# Patient Record
Sex: MALE | Race: WHITE | NOT HISPANIC OR LATINO | Employment: OTHER | URBAN - METROPOLITAN AREA
[De-identification: names, ages, dates, MRNs, and addresses within clinical notes are randomized per-mention and may not be internally consistent; named-entity substitution may affect disease eponyms.]

---

## 2018-06-04 ENCOUNTER — TRANSFERRED RECORDS (OUTPATIENT)
Dept: HEALTH INFORMATION MANAGEMENT | Facility: CLINIC | Age: 68
End: 2018-06-04

## 2018-06-11 ENCOUNTER — TRANSFERRED RECORDS (OUTPATIENT)
Dept: HEALTH INFORMATION MANAGEMENT | Facility: CLINIC | Age: 68
End: 2018-06-11

## 2018-06-25 ENCOUNTER — MEDICAL CORRESPONDENCE (OUTPATIENT)
Dept: HEALTH INFORMATION MANAGEMENT | Facility: CLINIC | Age: 68
End: 2018-06-25

## 2018-06-27 NOTE — TELEPHONE ENCOUNTER
FUTURE VISIT INFORMATION      FUTURE VISIT INFORMATION:    Date: 7/3/18    Time: 1500    Location: [facility]  REFERRAL INFORMATION:    Referring provider:  DR RODRÍGUEZ    Referring providers clinic:  [facility]    Reason for visit/diagnosis  L HIP    RECORDS REQUESTED FROM:       Clinic name Comments Records Status Imaging Status                                         RECORDS STATUS      RECORDS RECEIVED FROM: [facility]   DATE RECEIVED: 6/27/18   NOTES STATUS DETAILS   OFFICE NOTE from referring provider Received 6/1/18   OFFICE NOTE from other specialist N/A    DISCHARGE SUMMARY from hospital N/A    DISCHARGE REPORT from the ER N/A    OPERATIVE REPORT N/A    MEDICATION LIST N/A    IMPLANT RECORD/STICKER N/A    LABS     CBC/DIFF N/A    CULTURES N/A    NM BONE SCAN RECEIVED 6/11/18   MRI RECEIVED 6/4/18*   CT SCAN N/A    XRAYS (IMAGES & REPORTS) N/A    TUMOR     PATHOLOGY  Slides & report N/A

## 2018-06-30 ASSESSMENT — ENCOUNTER SYMPTOMS
ABDOMINAL PAIN: 0
MUSCLE CRAMPS: 1
BACK PAIN: 1
EYE IRRITATION: 1
ARTHRALGIAS: 1
NAUSEA: 0
DYSURIA: 0
EYE WATERING: 1
NECK PAIN: 1
RECTAL PAIN: 0
EYE REDNESS: 0
EYE PAIN: 0
HEARTBURN: 0
VOMITING: 0
FLANK PAIN: 0
POOR WOUND HEALING: 1
JAUNDICE: 0
MYALGIAS: 1
STIFFNESS: 1
DIARRHEA: 1
BRUISES/BLEEDS EASILY: 0
SWOLLEN GLANDS: 0
HEMATURIA: 0
MUSCLE WEAKNESS: 1
BLOOD IN STOOL: 1
JOINT SWELLING: 1
SKIN CHANGES: 0
BLOATING: 0
CONSTIPATION: 1
DIFFICULTY URINATING: 0
BOWEL INCONTINENCE: 0
DOUBLE VISION: 0
NAIL CHANGES: 1

## 2018-07-03 ENCOUNTER — OFFICE VISIT (OUTPATIENT)
Dept: ORTHOPEDICS | Facility: CLINIC | Age: 68
End: 2018-07-03
Payer: COMMERCIAL

## 2018-07-03 ENCOUNTER — PRE VISIT (OUTPATIENT)
Dept: ORTHOPEDICS | Facility: CLINIC | Age: 68
End: 2018-07-03

## 2018-07-03 VITALS — WEIGHT: 315 LBS | HEIGHT: 70 IN | BODY MASS INDEX: 45.1 KG/M2

## 2018-07-03 DIAGNOSIS — M89.9 BONE LESION: Primary | ICD-10-CM

## 2018-07-03 PROBLEM — Z47.89 AFTERCARE FOLLOWING SURGERY OF THE MUSCULOSKELETAL SYSTEM: Status: ACTIVE | Noted: 2018-03-22

## 2018-07-03 PROBLEM — D62 ACUTE BLOOD LOSS ANEMIA: Status: ACTIVE | Noted: 2018-03-08

## 2018-07-03 PROBLEM — K21.9 GASTROESOPHAGEAL REFLUX DISEASE WITHOUT ESOPHAGITIS: Status: ACTIVE | Noted: 2018-03-07

## 2018-07-03 PROBLEM — E66.01 MORBID OBESITY (H): Status: ACTIVE | Noted: 2018-03-07

## 2018-07-03 RX ORDER — FLUTICASONE PROPIONATE 50 MCG
1 SPRAY, SUSPENSION (ML) NASAL DAILY
COMMUNITY
End: 2022-01-01

## 2018-07-03 RX ORDER — FUROSEMIDE 20 MG
20 TABLET ORAL DAILY
Status: ON HOLD | COMMUNITY
Start: 2018-05-25 | End: 2023-01-01

## 2018-07-03 RX ORDER — OXYCODONE HYDROCHLORIDE 5 MG/1
TABLET ORAL
COMMUNITY
Start: 2018-06-25 | End: 2022-01-01

## 2018-07-03 RX ORDER — GLIPIZIDE 10 MG/1
10 TABLET, FILM COATED, EXTENDED RELEASE ORAL DAILY
Status: ON HOLD | COMMUNITY
Start: 2018-05-25 | End: 2023-01-01

## 2018-07-03 RX ORDER — ATORVASTATIN CALCIUM 40 MG/1
TABLET, FILM COATED ORAL
COMMUNITY
Start: 2018-05-25 | End: 2018-07-03

## 2018-07-03 RX ORDER — PEN NEEDLE, DIABETIC 32GX 5/32"
NEEDLE, DISPOSABLE MISCELLANEOUS
Status: ON HOLD | COMMUNITY
Start: 2018-05-24 | End: 2023-01-01

## 2018-07-03 RX ORDER — IBUPROFEN 600 MG/1
TABLET, FILM COATED ORAL
COMMUNITY
Start: 2018-03-09 | End: 2018-07-03

## 2018-07-03 RX ORDER — METFORMIN HCL 500 MG
TABLET, EXTENDED RELEASE 24 HR ORAL
COMMUNITY
Start: 2018-05-25 | End: 2022-01-01

## 2018-07-03 RX ORDER — LIRAGLUTIDE 6 MG/ML
1.8 INJECTION SUBCUTANEOUS DAILY
Status: ON HOLD | COMMUNITY
Start: 2018-06-19 | End: 2023-01-01

## 2018-07-03 RX ORDER — PIOGLITAZONEHYDROCHLORIDE 45 MG/1
45 TABLET ORAL DAILY
Status: ON HOLD | COMMUNITY
Start: 2018-05-25 | End: 2023-01-01

## 2018-07-03 RX ORDER — DIPHENOXYLATE HYDROCHLORIDE AND ATROPINE SULFATE 2.5; .025 MG/1; MG/1
1 TABLET ORAL
COMMUNITY
Start: 2012-07-03 | End: 2022-01-01

## 2018-07-03 RX ORDER — OMEPRAZOLE 40 MG/1
CAPSULE, DELAYED RELEASE ORAL
COMMUNITY
Start: 2018-05-25 | End: 2022-01-01

## 2018-07-03 RX ORDER — ATORVASTATIN CALCIUM 40 MG/1
40 TABLET, FILM COATED ORAL DAILY
Status: ON HOLD | COMMUNITY
Start: 2018-02-27 | End: 2023-01-01

## 2018-07-03 RX ORDER — HYDROCODONE BITARTRATE AND ACETAMINOPHEN 10; 325 MG/1; MG/1
TABLET ORAL
COMMUNITY
Start: 2018-02-19 | End: 2022-01-01

## 2018-07-03 RX ORDER — AMOXICILLIN 250 MG
1 CAPSULE ORAL
COMMUNITY
Start: 2018-03-09 | End: 2022-01-01

## 2018-07-03 RX ORDER — TRIAMCINOLONE ACETONIDE 1 MG/G
CREAM TOPICAL
COMMUNITY
End: 2022-01-01

## 2018-07-03 NOTE — LETTER
7/3/2018     RE: Waldo Bustos  [address] [facility] [address]     Dear Colleague,    Thank you for referring your patient, Waldo Bustos, to the [facility]. Please see a copy of my visit note below.    [facility] Orthopedic Consultation    Waldo Bustos MRN# [id number]   Age: 67 year old YOB: 1950          Impression and Recommendation:   Impression:  Waldo Bustos is a 67 year old male nearly 4 months s/p L BELLA at an OSH with persistent left hip pain and an incidental finding of a left posterior ilium intra-osseous lesion.  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  It is possible that his persistent hip pain secondary to irritation of his iliopsoas.    Discussion had with the patient regarding this incidental finding and that is likely of benign origin.  Given the symptoms he is describing and dislocation in the groin is unlikely at this lesion as it has anything to do with his persistent left hip pain.  Furthermore after review of his MRI this appears to be something that can be managed conservatively and followed without intervention.  Your to get completed imaging of this lesion we will send him for a CT of the pelvis and Dr. Whitaker will contact the patient but the results and if any further intervention is needed.           Chief Complaint:   Left hip pain         History of Present Illness:   This patient is a 67 year old male with a past medical history of morbid obesity and diabetes mellitus who presents with persistent left hip pain following a left total hip arthroplasty at [place] in March 2018.  During his workup for this continued left hip pain the patient was found to have a lesion to the posterior left ilium near the sacroiliac joint for which he was referred to Dr. Whitaker for evaluation.  Prior to hip surgery the patient's pain was primarily in the left  "groin and it has improved since surgery but not to the degree the patient had hoped.  The pain now still persists in the left groin although it is improved overall from prior to surgery.  He does have a long-standing history of low back pain without specific laterality and without any radiation down either of his legs.    Patient does report that his surgeon is sending him to [facility] for a steroid injection into his left iliopsoas.     History obtained from patient interview and chart review.        Past Medical History:     Past Medical History:   Diagnosis Date     Arthritis 5 years ago     Chronic osteoarthritis Not sure     Diabetes (H) 10-12 years ago     RA (rheumatoid arthritis) (H) Not sure     Reduced vision 2-3 years ago    Cataract Surgery on both eyes             Past Surgical History:     Past Surgical History:   Procedure Laterality Date     C PELVIS/HIP JOINT SURGERY UNLISTED       C STOMACH SURGERY PROCEDURE UNLISTED  1955    2 Hernia surgeries as a child            Physical Exam:     Ht 1.778 m (5' 10\")  Wt 148.9 kg (328 lb 4.8 oz)  BMI 47.11 kg/m2  General: awake, alert, cooperative, no apparent distress  Respiratory: breathing non-labored, no wheezing  Cardiovascular: peripheral pulses 2+ and symmetric, capillary refill < 2sec, skin wwp  Skin: no rashes or lesions  Musculoskeletal:  gains a standing position with support of his cane and; antalgic gait  LLE: No gross deformity.  Able to actively flex at the hip with 5 out of 5 strength although incites groin pain upon resistance.  Hip flexion to 90 .  Internal rotation 0 .  External rotation in flexion 45  .  Motor function completely intact distally          Imaging:   Review of MRI of the left hip and lumbar spine demonstrate an intraosseous lesion to the posterior left ilium abutting the sacroiliac joint without invasion.  Grossly homogeneous.  Enhancing on T2 imaging consistent with fluid-filled cyst.      Cali Johnson MD " 07/03/2018  Orthopaedic Surgery Resident, PGY-4  Pager: [phone]      Answers for HPI/ROS submitted by the patient on 6/30/2018   General Symptoms: No  Skin Symptoms: Yes  HENT Symptoms: No  EYE SYMPTOMS: Yes  HEART SYMPTOMS: No  LUNG SYMPTOMS: No  INTESTINAL SYMPTOMS: Yes  URINARY SYMPTOMS: Yes  REPRODUCTIVE SYMPTOMS: No  SKELETAL SYMPTOMS: Yes  BLOOD SYMPTOMS: Yes  NERVOUS SYSTEM SYMPTOMS: No  MENTAL HEALTH SYMPTOMS: No  Changes in hair: No  Changes in moles/birth marks: No  Itching: No  Rashes: No  Changes in nails: Yes  Acne: No  Change in facial hair: No  Warts: No  Non-healing sores: Yes  Scarring: No  Flaking of skin: No  Color changes of hands/feet in cold : No  Sun sensitivity: No  Skin thickening: No  Eye pain: No  Vision loss: No  Dry eyes: Yes  Watery eyes: Yes  Eye bulging: No  Double vision: No  Flashing of lights: Yes  Spots: No  Floaters: No  Redness: No  Crossed eyes: No  Tunnel Vision: No  Yellowing of eyes: No  Eye irritation: Yes  Heart burn or indigestion: No  Nausea: No  Vomiting: No  Abdominal pain: No  Bloating: No  Constipation: Yes  Diarrhea: Yes  Blood in stool: Yes  Black stools: No  Rectal or Anal pain: No  Fecal incontinence: No  Yellowing of skin or eyes: No  Vomit with blood: No  Change in stools: No  Trouble holding urine or incontinence: Yes  Pain or burning: No  Trouble starting or stopping: No  Increased frequency of urination: Yes  Blood in urine: No  Decreased frequency of urination: No  Frequent nighttime urination: Yes  Flank pain: No  Difficulty emptying bladder: No  Back pain: Yes  Muscle aches: Yes  Neck pain: Yes  Swollen joints: Yes  Joint pain: Yes  Bone pain: Yes  Muscle cramps: Yes  Muscle weakness: Yes  Joint stiffness: Yes  Bone fracture: No  Anemia: No  Swollen glands: No  Easy bleeding or bruising: No  Edema or swelling: Yes  PHQ-2 Score: 1      Dear Rogelio,    Thank you for asking me to see  for evaluation of an incidental finding of a lesion in the  posterior aspect of the left pelvis.  As you know this lesion is active on bone scan is seen on MRI scan as a fluid-filled spot.  I recommend a CT scan and based on that finding we will likely recommend image guided biopsy.  My suspicion is that the lesion is most likely benign bone cyst.    I saw the patient today with Dr. Johnson.  I agree with his assessment and plan.  We will keep you informed on her findings.  Answers for HPI/ROS submitted by the patient on 6/30/2018   General Symptoms: No  Skin Symptoms: Yes  HENT Symptoms: No  EYE SYMPTOMS: Yes  HEART SYMPTOMS: No  LUNG SYMPTOMS: No  INTESTINAL SYMPTOMS: Yes  URINARY SYMPTOMS: Yes  REPRODUCTIVE SYMPTOMS: No  SKELETAL SYMPTOMS: Yes  BLOOD SYMPTOMS: Yes  NERVOUS SYSTEM SYMPTOMS: No  MENTAL HEALTH SYMPTOMS: No  Changes in hair: No  Changes in moles/birth marks: No  Itching: No  Rashes: No  Changes in nails: Yes  Acne: No  Change in facial hair: No  Warts: No  Non-healing sores: Yes  Scarring: No  Flaking of skin: No  Color changes of hands/feet in cold : No  Sun sensitivity: No  Skin thickening: No  Eye pain: No  Vision loss: No  Dry eyes: Yes  Watery eyes: Yes  Eye bulging: No  Double vision: No  Flashing of lights: Yes  Spots: No  Floaters: No  Redness: No  Crossed eyes: No  Tunnel Vision: No  Yellowing of eyes: No  Eye irritation: Yes  Heart burn or indigestion: No  Nausea: No  Vomiting: No  Abdominal pain: No  Bloating: No  Constipation: Yes  Diarrhea: Yes  Blood in stool: Yes  Black stools: No  Rectal or Anal pain: No  Fecal incontinence: No  Yellowing of skin or eyes: No  Vomit with blood: No  Change in stools: No  Trouble holding urine or incontinence: Yes  Pain or burning: No  Trouble starting or stopping: No  Increased frequency of urination: Yes  Blood in urine: No  Decreased frequency of urination: No  Frequent nighttime urination: Yes  Flank pain: No  Difficulty emptying bladder: No  Back pain: Yes  Muscle aches: Yes  Neck pain: Yes  Swollen  joints: Yes  Joint pain: Yes  Bone pain: Yes  Muscle cramps: Yes  Muscle weakness: Yes  Joint stiffness: Yes  Bone fracture: No  Anemia: No  Swollen glands: No  Easy bleeding or bruising: No  Edema or swelling: Yes  PHQ-2 Score: 1    Again, thank you for allowing me to participate in the care of your patient.      Sincerely,    Maikol Whitaker MD

## 2018-07-03 NOTE — PROGRESS NOTES
Dear Rogelio,    Thank you for asking me to see  for evaluation of an incidental finding of a lesion in the posterior aspect of the left pelvis.  As you know this lesion is active on bone scan is seen on MRI scan as a fluid-filled spot.  I recommend a CT scan and based on that finding we will likely recommend image guided biopsy.  My suspicion is that the lesion is most likely benign bone cyst.    I saw the patient today with Dr. Johnson.  I agree with his assessment and plan.  We will keep you informed on her findings.  Answers for HPI/ROS submitted by the patient on 6/30/2018   General Symptoms: No  Skin Symptoms: Yes  HENT Symptoms: No  EYE SYMPTOMS: Yes  HEART SYMPTOMS: No  LUNG SYMPTOMS: No  INTESTINAL SYMPTOMS: Yes  URINARY SYMPTOMS: Yes  REPRODUCTIVE SYMPTOMS: No  SKELETAL SYMPTOMS: Yes  BLOOD SYMPTOMS: Yes  NERVOUS SYSTEM SYMPTOMS: No  MENTAL HEALTH SYMPTOMS: No  Changes in hair: No  Changes in moles/birth marks: No  Itching: No  Rashes: No  Changes in nails: Yes  Acne: No  Change in facial hair: No  Warts: No  Non-healing sores: Yes  Scarring: No  Flaking of skin: No  Color changes of hands/feet in cold : No  Sun sensitivity: No  Skin thickening: No  Eye pain: No  Vision loss: No  Dry eyes: Yes  Watery eyes: Yes  Eye bulging: No  Double vision: No  Flashing of lights: Yes  Spots: No  Floaters: No  Redness: No  Crossed eyes: No  Tunnel Vision: No  Yellowing of eyes: No  Eye irritation: Yes  Heart burn or indigestion: No  Nausea: No  Vomiting: No  Abdominal pain: No  Bloating: No  Constipation: Yes  Diarrhea: Yes  Blood in stool: Yes  Black stools: No  Rectal or Anal pain: No  Fecal incontinence: No  Yellowing of skin or eyes: No  Vomit with blood: No  Change in stools: No  Trouble holding urine or incontinence: Yes  Pain or burning: No  Trouble starting or stopping: No  Increased frequency of urination: Yes  Blood in urine: No  Decreased frequency of urination: No  Frequent nighttime urination:  Yes  Flank pain: No  Difficulty emptying bladder: No  Back pain: Yes  Muscle aches: Yes  Neck pain: Yes  Swollen joints: Yes  Joint pain: Yes  Bone pain: Yes  Muscle cramps: Yes  Muscle weakness: Yes  Joint stiffness: Yes  Bone fracture: No  Anemia: No  Swollen glands: No  Easy bleeding or bruising: No  Edema or swelling: Yes  PHQ-2 Score: 1

## 2018-07-03 NOTE — PROGRESS NOTES
[facility] Orthopedic Consultation    Waldo Bustos MRN# [id number]   Age: 67 year old YOB: 1950          Impression and Recommendation:   Impression:  Waldo Bustos is a 67 year old male nearly 4 months s/p L BELLA at an OSH with persistent left hip pain and an incidental finding of a left posterior ilium intra-osseous lesion.  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  It is possible that his persistent hip pain secondary to irritation of his iliopsoas.    Discussion had with the patient regarding this incidental finding and that is likely of benign origin.  Given the symptoms he is describing and dislocation in the groin is unlikely at this lesion as it has anything to do with his persistent left hip pain.  Furthermore after review of his MRI this appears to be something that can be managed conservatively and followed without intervention.  Your to get completed imaging of this lesion we will send him for a CT of the pelvis and Dr. Whitaker will contact the patient but the results and if any further intervention is needed.           Chief Complaint:   Left hip pain         History of Present Illness:   This patient is a 67 year old male with a past medical history of morbid obesity and diabetes mellitus who presents with persistent left hip pain following a left total hip arthroplasty at [place] in March 2018.  During his workup for this continued left hip pain the patient was found to have a lesion to the posterior left ilium near the sacroiliac joint for which he was referred to Dr. Whitaker for evaluation.  Prior to hip surgery the patient's pain was primarily in the left groin and it has improved since surgery but not to the degree the patient had hoped.  The pain now still persists in the left groin although it is improved overall from prior to surgery.  He does have a long-standing history of low back pain without specific laterality and without  "any radiation down either of his legs.    Patient does report that his surgeon is sending him to [facility] for a steroid injection into his left iliopsoas.     History obtained from patient interview and chart review.        Past Medical History:     Past Medical History:   Diagnosis Date     Arthritis 5 years ago     Chronic osteoarthritis Not sure     Diabetes (H) 10-12 years ago     RA (rheumatoid arthritis) (H) Not sure     Reduced vision 2-3 years ago    Cataract Surgery on both eyes             Past Surgical History:     Past Surgical History:   Procedure Laterality Date     C PELVIS/HIP JOINT SURGERY UNLISTED       C STOMACH SURGERY PROCEDURE UNLISTED  1955    2 Hernia surgeries as a child            Physical Exam:     Ht 1.778 m (5' 10\")  Wt 148.9 kg (328 lb 4.8 oz)  BMI 47.11 kg/m2  General: awake, alert, cooperative, no apparent distress  Respiratory: breathing non-labored, no wheezing  Cardiovascular: peripheral pulses 2+ and symmetric, capillary refill < 2sec, skin wwp  Skin: no rashes or lesions  Musculoskeletal:  gains a standing position with support of his cane and; antalgic gait  LLE: No gross deformity.  Able to actively flex at the hip with 5 out of 5 strength although incites groin pain upon resistance.  Hip flexion to 90 .  Internal rotation 0 .  External rotation in flexion 45 .  Motor function completely intact distally          Imaging:   Review of MRI of the left hip and lumbar spine demonstrate an intraosseous lesion to the posterior left ilium abutting the sacroiliac joint without invasion.  Grossly homogeneous.  Enhancing on T2 imaging consistent with fluid-filled cyst.      Cali Johnson MD 07/03/2018  Orthopaedic Surgery Resident, PGY-4  Pager: [phone]      Answers for HPI/ROS submitted by the patient on 6/30/2018   General Symptoms: No  Skin Symptoms: Yes  HENT Symptoms: No  EYE SYMPTOMS: Yes  HEART SYMPTOMS: No  LUNG SYMPTOMS: No  INTESTINAL SYMPTOMS: Yes  URINARY SYMPTOMS: " Yes  REPRODUCTIVE SYMPTOMS: No  SKELETAL SYMPTOMS: Yes  BLOOD SYMPTOMS: Yes  NERVOUS SYSTEM SYMPTOMS: No  MENTAL HEALTH SYMPTOMS: No  Changes in hair: No  Changes in moles/birth marks: No  Itching: No  Rashes: No  Changes in nails: Yes  Acne: No  Change in facial hair: No  Warts: No  Non-healing sores: Yes  Scarring: No  Flaking of skin: No  Color changes of hands/feet in cold : No  Sun sensitivity: No  Skin thickening: No  Eye pain: No  Vision loss: No  Dry eyes: Yes  Watery eyes: Yes  Eye bulging: No  Double vision: No  Flashing of lights: Yes  Spots: No  Floaters: No  Redness: No  Crossed eyes: No  Tunnel Vision: No  Yellowing of eyes: No  Eye irritation: Yes  Heart burn or indigestion: No  Nausea: No  Vomiting: No  Abdominal pain: No  Bloating: No  Constipation: Yes  Diarrhea: Yes  Blood in stool: Yes  Black stools: No  Rectal or Anal pain: No  Fecal incontinence: No  Yellowing of skin or eyes: No  Vomit with blood: No  Change in stools: No  Trouble holding urine or incontinence: Yes  Pain or burning: No  Trouble starting or stopping: No  Increased frequency of urination: Yes  Blood in urine: No  Decreased frequency of urination: No  Frequent nighttime urination: Yes  Flank pain: No  Difficulty emptying bladder: No  Back pain: Yes  Muscle aches: Yes  Neck pain: Yes  Swollen joints: Yes  Joint pain: Yes  Bone pain: Yes  Muscle cramps: Yes  Muscle weakness: Yes  Joint stiffness: Yes  Bone fracture: No  Anemia: No  Swollen glands: No  Easy bleeding or bruising: No  Edema or swelling: Yes  PHQ-2 Score: 1

## 2018-07-03 NOTE — MR AVS SNAPSHOT
After Visit Summary   7/3/2018    Waldo Bustos    MRN: [id number]           Patient Information     Date Of Birth          1950        Visit Information        Provider Department      7/3/2018 3:00 PM Maikol Whitaker MD [facility]        Today's Diagnoses     Bone lesion    -  1      Care Instructions    CT pelvis          Follow-ups after your visit        Your next 10 appointments already scheduled     Jul 06, 2018  8:40 AM CDT   CT PELVIS W/O CONTRAST with UCCT1   [facility] [place] ([facility])    [address]  1st Floor  [facility] [id number] [phone]           Please bring any scans or X-rays taken at other hospitals, if similar tests were done. Also bring a list of your medicines, including vitamins, minerals and over-the-counter drugs. It is safest to leave personal items at home.  Be sure to tell your doctor:   If you have any allergies.   If there s any chance you are pregnant.   If you are breastfeeding.  How to prepare:   Do not eat or drink for 2 hours before your exam. If you need to take medicine, you may take it with small sips of water. (We may ask you to take liquid medicine as well.)   Please wear loose clothing, such as a sweat suit or jogging clothes. Avoid snaps, zippers and other metal. We may ask you to undress and put on a hospital gown.  Please arrive 30 minutes early for your CT. Once in the department you might be asked to drink water 15-20 minutes prior to your exam.  If indicated you may be asked to drink an oral contrast in advance of your CT.  If this is the case, the imaging team will let you know or be in contact with you prior to your appointment  Patients over 70 or patients with diabetes or kidney problems:   If you haven t had a blood test (creatinine test) within the last 30 days, the Cardiologist/Radiologist may require you to get this test prior to your exam.  If you have diabetes:  "  Continue to take your metformin medication on the day of your exam  If you have any questions, please call the Imaging Department where you will have your exam.              Future tests that were ordered for you today     Open Future Orders        Priority Expected Expires Ordered    CT Pelvis w/o contrast Routine  7/3/2019 7/3/2018            Who to contact     Please call your clinic at [phone] to:    Ask questions about your health    Make or cancel appointments    Discuss your medicines    Learn about your test results    Speak to your doctor            Additional Information About Your Visit        [employer] [facility] Information     [employer] gives you secure access to your electronic health record. If you see a primary care provider, you can also send messages to your care team and make appointments. If you have questions, please call your primary care clinic.  If you do not have a primary care provider, please call [phone] and they will assist you.      [employer] is an electronic gateway that provides easy, online access to your medical records. With [employer], you can request a clinic appointment, read your test results, renew a prescription or communicate with your care team.     To access your existing account, please contact your [facility] or call [phone] for assistance.        Care EveryWhere ID     This is your Care EveryWhere ID. This could be used by other organizations to access your [facility] medical records  [id number]        Your Vitals Were     Height BMI (Body Mass Index)                1.778 m (5' 10\") 47.11 kg/m2           Blood Pressure from Last 3 Encounters:   No data found for BP    Weight from Last 3 Encounters:   07/03/18 148.9 kg (328 lb 4.8 oz)                 Today's Medication Changes          These changes are accurate as of 7/3/18  6:01 PM.  If you have any questions, ask your nurse or doctor.               These medicines have changed or have " updated prescriptions.        Dose/Directions    atorvastatin 40 MG tablet   Commonly known as:  LIPITOR   This may have changed:  Another medication with the same name was removed. Continue taking this medication, and follow the directions you see here.   Changed by:  Maikol Whitaker MD        Dose:  40 mg   Take 40 mg by mouth   Refills:  0       tiZANidine 4 MG tablet   Commonly known as:  ZANAFLEX   This may have changed:  Another medication with the same name was removed. Continue taking this medication, and follow the directions you see here.   Changed by:  Maikol Whitaker MD        Dose:  4 mg   Take 4 mg by mouth   Refills:  0         Stop taking these medicines if you haven't already. Please contact your care team if you have questions.     aspirin 325 MG EC tablet   Stopped by:  Maikol Whitaker MD           ibuprofen 600 MG tablet   Commonly known as:  ADVIL/MOTRIN   Stopped by:  Maikol Whitaker MD                   Information about OPIOIDS     PRESCRIPTION OPIOIDS: WHAT YOU NEED TO KNOW   We gave you an opioid (narcotic) pain medicine. It is important to manage your pain, but opioids are not always the best choice. You should first try all the other options your care team gave you. Take this medicine for as short a time (and as few doses) as possible.     These medicines have risks:    DO NOT drive when on new or higher doses of pain medicine. These medicines can affect your alertness and reaction times, and you could be arrested for driving under the influence (DUI). If you need to use opioids long-term, talk to your care team about driving.    DO NOT operate heave machinery    DO NOT do any other dangerous activities while taking these medicines.     DO NOT drink any alcohol while taking these medicines.      If the opioid prescribed includes acetaminophen, DO NOT take with any other medicines that contain acetaminophen. Read all labels carefully. Look for the word   acetaminophen  or  Tylenol.  Ask your pharmacist if you have questions or are unsure.    You can get addicted to pain medicines, especially if you have a history of addiction (chemical, alcohol or substance dependence). Talk to your care team about ways to reduce this risk.    Store your pills in a secure place, locked if possible. We will not replace any lost or stolen medicine. If you don t finish your medicine, please throw away (dispose) as directed by your pharmacist. The [place] Pollution Control Agency has more information about safe disposal: https://www.pca.[facility].mn.us/living-green/managing-unwanted-medications.     All opioids tend to cause constipation. Drink plenty of water and eat foods that have a lot of fiber, such as fruits, vegetables, prune juice, apple juice and high-fiber cereal. Take a laxative (Miralax, milk of magnesia, Colace, Senna) if you don t move your bowels at least every other day.          Primary Care Provider Office Phone # Fax #    Jv Felix [phone]       [facility] [address] [facility] [address]        Equal Access to Services     [facility] Center[facility] : Hadii mirella Echols, waaxda willard, qaybta michael ghosh, duran saunders. So [facility] [phone].    ATENCIÓN: Si habla español, tiene a hillman disposición servicios gratuitos de asistencia lingüística. Javier al [phone].    We comply with applicable federal civil rights laws and [place] laws. We do not discriminate on the basis of race, color, national origin, age, disability, sex, sexual orientation, or gender identity.            Thank you!     Thank you for choosing [facility]  for your care. Our goal is always to provide you with excellent care. Hearing back from our patients is one way we can continue to improve our services. Please take a few minutes to complete the written survey that you may receive in the mail after your visit  with us. Thank you!             Your Updated Medication List - Protect others around you: Learn how to safely use, store and throw away your medicines at www.disposemymeds.org.          This list is accurate as of 7/3/18  6:01 PM.  Always use your most recent med list.                   Brand Name Dispense Instructions for use Diagnosis    atorvastatin 40 MG tablet    LIPITOR     Take 40 mg by mouth        BD CEM U/F 32G X 4 MM   Generic drug:  insulin pen needle           cholecalciferol 400 UNIT/ML Liqd liquid    vitamin D/D-VI-SOL     Take 3,000 Units by mouth daily        COENZYME Q-10 PO      Take 50 mg by mouth daily        fluticasone 50 MCG/ACT spray    FLONASE     Spray 1 spray into both nostrils daily        furosemide 20 MG tablet    LASIX          glipiZIDE 10 MG 24 hr tablet    GLUCOTROL XL          HYDROcodone-acetaminophen  MG per tablet    NORCO          INDOCIN PO      Take 50 mg by mouth 3 times daily        metFORMIN 500 MG 24 hr tablet    GLUCOPHAGE-XR          MULTI-VITAMINS Tabs      Take 1 tablet by mouth        omeprazole 40 MG capsule    priLOSEC          oxyCODONE IR 5 MG tablet    ROXICODONE          pioglitazone 45 MG tablet    ACTOS          senna-docusate 8.6-50 MG per tablet    SENOKOT-S;PERICOLACE     Take 1 tablet by mouth        tiZANidine 4 MG tablet    ZANAFLEX     Take 4 mg by mouth        triamcinolone 0.1 % cream    KENALOG          VICTOZA PEN 18 MG/3ML soln   Generic drug:  liraglutide           Walker Auto Glides Misc      2 Wheeled Walker for home use. For 6 weeks.

## 2018-07-03 NOTE — NURSING NOTE
"Chief Complaint   Patient presents with     Consult     2nd Opinion in regards to his Left Hip Pain, that Dr. Rodríguez said is caused by a Bone Lesion of his Pelvis. Pt. states that he doesn't believe his pain is from the Lesion, but from having a Left BELLA on 3/7/2018, done by Dr. Rodríguez as well. Referring:  ROBBY RODRÍGUEZ       67 year old      Ht 1.778 m (5' 10\")  Wt 148.9 kg (328 lb 4.8 oz)  BMI 47.11 kg/m2        Date/Surgery/Surgeon/Hospital:  1. 03/07/2018, Left BELLA, Dr. Robby Rodríguez        [id number] [address] [place] [address],  AT [facility]    Allergies   Allergen Reactions     Sulfa Drugs      Other reaction(s): *Unknown - Childhood Rxn     Current Outpatient Prescriptions   Medication     atorvastatin (LIPITOR) 40 MG tablet     BD CEM U/F 32G X 4 MM insulin pen needle     cholecalciferol (VITAMIN D/D-VI-SOL) 400 UNIT/ML LIQD liquid     COENZYME Q-10 PO     fluticasone (FLONASE) 50 MCG/ACT spray     furosemide (LASIX) 20 MG tablet     glipiZIDE (GLUCOTROL XL) 10 MG 24 hr tablet     HYDROcodone-acetaminophen (NORCO)  MG per tablet     Indomethacin (INDOCIN PO)     metFORMIN (GLUCOPHAGE-XR) 500 MG 24 hr tablet     Misc. Devices (WALKER AUTO GLIDES) MISC     Multiple Vitamin (MULTI-VITAMINS) TABS     omeprazole (PRILOSEC) 40 MG capsule     oxyCODONE IR (ROXICODONE) 5 MG tablet     pioglitazone (ACTOS) 45 MG tablet     senna-docusate (SENOKOT-S;PERICOLACE) 8.6-50 MG per tablet     tiZANidine (ZANAFLEX) 4 MG tablet     triamcinolone (KENALOG) 0.1 % cream     VICTOZA PEN 18 MG/3ML soln     [DISCONTINUED] atorvastatin (LIPITOR) 40 MG tablet     No current facility-administered medications for this visit.                    "

## 2018-07-06 ENCOUNTER — RADIANT APPOINTMENT (OUTPATIENT)
Dept: CT IMAGING | Facility: CLINIC | Age: 68
End: 2018-07-06
Attending: ORTHOPAEDIC SURGERY
Payer: COMMERCIAL

## 2018-07-06 DIAGNOSIS — M89.9 BONE LESION: ICD-10-CM

## 2019-09-29 ENCOUNTER — HEALTH MAINTENANCE LETTER (OUTPATIENT)
Age: 69
End: 2019-09-29

## 2019-10-28 LAB
CREATININE (EXTERNAL): 1.16 MG/DL (ref 0.72–1.25)
GFR ESTIMATED (EXTERNAL): >60 ML/MIN/1.73M2
GFR ESTIMATED (IF AFRICAN AMERICAN) (EXTERNAL): >60 ML/MIN/1.73M2

## 2020-03-15 ENCOUNTER — HEALTH MAINTENANCE LETTER (OUTPATIENT)
Age: 70
End: 2020-03-15

## 2020-08-19 LAB
ALBUMIN (URINE) MG/SPEC: 7.1 MG/L
ALBUMIN/CREATININE RATIO: 4.4 MG/G CREAT
CHOLESTEROL (EXTERNAL): 150 MG/DL (ref 100–199)
CREATININE (URINE): 1.6 G/L
HBA1C MFR BLD: 6.3 %
HDLC SERPL-MCNC: 30 MG/DL
LDL CHOLESTEROL CALCULATED (EXTERNAL): 82 MG/DL
NON HDL CHOLESTEROL (EXTERNAL): 120 MG/DL
TRIGLYCERIDES (EXTERNAL): 192 MG/DL

## 2021-01-14 ENCOUNTER — HEALTH MAINTENANCE LETTER (OUTPATIENT)
Age: 71
End: 2021-01-14

## 2021-01-16 ENCOUNTER — TRANSFERRED RECORDS (OUTPATIENT)
Dept: HEALTH INFORMATION MANAGEMENT | Facility: CLINIC | Age: 71
End: 2021-01-16
Payer: COMMERCIAL

## 2021-02-26 ENCOUNTER — TRANSFERRED RECORDS (OUTPATIENT)
Dept: HEALTH INFORMATION MANAGEMENT | Facility: CLINIC | Age: 71
End: 2021-02-26
Payer: COMMERCIAL

## 2021-05-09 ENCOUNTER — HEALTH MAINTENANCE LETTER (OUTPATIENT)
Age: 71
End: 2021-05-09

## 2021-08-29 ENCOUNTER — HEALTH MAINTENANCE LETTER (OUTPATIENT)
Age: 71
End: 2021-08-29

## 2021-10-24 ENCOUNTER — HEALTH MAINTENANCE LETTER (OUTPATIENT)
Age: 71
End: 2021-10-24

## 2021-12-19 ENCOUNTER — HEALTH MAINTENANCE LETTER (OUTPATIENT)
Age: 71
End: 2021-12-19

## 2022-01-01 ENCOUNTER — APPOINTMENT (OUTPATIENT)
Dept: PHYSICAL THERAPY | Facility: SKILLED NURSING FACILITY | Age: 72
DRG: 559 | End: 2022-01-01
Attending: INTERNAL MEDICINE
Payer: COMMERCIAL

## 2022-01-01 ENCOUNTER — APPOINTMENT (OUTPATIENT)
Dept: OCCUPATIONAL THERAPY | Facility: SKILLED NURSING FACILITY | Age: 72
DRG: 559 | End: 2022-01-01
Attending: INTERNAL MEDICINE
Payer: COMMERCIAL

## 2022-01-01 ENCOUNTER — OFFICE VISIT (OUTPATIENT)
Dept: INFECTIOUS DISEASES | Facility: CLINIC | Age: 72
End: 2022-01-01
Attending: FAMILY MEDICINE
Payer: COMMERCIAL

## 2022-01-01 ENCOUNTER — APPOINTMENT (OUTPATIENT)
Dept: PHYSICAL THERAPY | Facility: CLINIC | Age: 72
DRG: 463 | End: 2022-01-01
Attending: ORTHOPAEDIC SURGERY
Payer: COMMERCIAL

## 2022-01-01 ENCOUNTER — HOSPITAL ENCOUNTER (INPATIENT)
Facility: CLINIC | Age: 72
LOS: 32 days | Discharge: NURSING, REHAB OR LONG-TERM CARE | DRG: 463 | End: 2022-12-24
Attending: ORTHOPAEDIC SURGERY | Admitting: ORTHOPAEDIC SURGERY
Payer: COMMERCIAL

## 2022-01-01 ENCOUNTER — OFFICE VISIT (OUTPATIENT)
Dept: ORTHOPEDICS | Facility: CLINIC | Age: 72
End: 2022-01-01
Payer: COMMERCIAL

## 2022-01-01 ENCOUNTER — PRE VISIT (OUTPATIENT)
Dept: SURGERY | Facility: CLINIC | Age: 72
End: 2022-01-01

## 2022-01-01 ENCOUNTER — ANESTHESIA (OUTPATIENT)
Dept: SURGERY | Facility: CLINIC | Age: 72
DRG: 463 | End: 2022-01-01
Payer: COMMERCIAL

## 2022-01-01 ENCOUNTER — HEALTH MAINTENANCE LETTER (OUTPATIENT)
Age: 72
End: 2022-01-01

## 2022-01-01 ENCOUNTER — ANESTHESIA EVENT (OUTPATIENT)
Dept: SURGERY | Facility: CLINIC | Age: 72
DRG: 463 | End: 2022-01-01
Payer: COMMERCIAL

## 2022-01-01 ENCOUNTER — TELEPHONE (OUTPATIENT)
Dept: ORTHOPEDICS | Facility: CLINIC | Age: 72
End: 2022-01-01

## 2022-01-01 ENCOUNTER — ANCILLARY PROCEDURE (OUTPATIENT)
Dept: GENERAL RADIOLOGY | Facility: CLINIC | Age: 72
End: 2022-01-01
Attending: ORTHOPAEDIC SURGERY
Payer: COMMERCIAL

## 2022-01-01 ENCOUNTER — PREP FOR PROCEDURE (OUTPATIENT)
Dept: ORTHOPEDICS | Facility: CLINIC | Age: 72
End: 2022-01-01

## 2022-01-01 ENCOUNTER — OFFICE VISIT (OUTPATIENT)
Dept: SURGERY | Facility: CLINIC | Age: 72
End: 2022-01-01
Payer: COMMERCIAL

## 2022-01-01 ENCOUNTER — TRANSCRIBE ORDERS (OUTPATIENT)
Dept: OTHER | Age: 72
End: 2022-01-01

## 2022-01-01 ENCOUNTER — MEDICAL CORRESPONDENCE (OUTPATIENT)
Dept: HEALTH INFORMATION MANAGEMENT | Facility: CLINIC | Age: 72
End: 2022-01-01

## 2022-01-01 ENCOUNTER — LAB (OUTPATIENT)
Dept: LAB | Facility: CLINIC | Age: 72
End: 2022-01-01
Payer: COMMERCIAL

## 2022-01-01 ENCOUNTER — HOSPITAL ENCOUNTER (INPATIENT)
Facility: SKILLED NURSING FACILITY | Age: 72
LOS: 104 days | Discharge: ANOTHER HOSPITAL | DRG: 559 | End: 2023-04-07
Attending: INTERNAL MEDICINE | Admitting: INTERNAL MEDICINE
Payer: COMMERCIAL

## 2022-01-01 ENCOUNTER — APPOINTMENT (OUTPATIENT)
Dept: GENERAL RADIOLOGY | Facility: CLINIC | Age: 72
DRG: 463 | End: 2022-01-01
Attending: INTERNAL MEDICINE
Payer: COMMERCIAL

## 2022-01-01 ENCOUNTER — APPOINTMENT (OUTPATIENT)
Dept: PHYSICAL THERAPY | Facility: CLINIC | Age: 72
DRG: 463 | End: 2022-01-01
Payer: COMMERCIAL

## 2022-01-01 ENCOUNTER — PATIENT OUTREACH (OUTPATIENT)
Dept: CARE COORDINATION | Facility: CLINIC | Age: 72
End: 2022-01-01

## 2022-01-01 ENCOUNTER — ANESTHESIA EVENT (OUTPATIENT)
Dept: SURGERY | Facility: CLINIC | Age: 72
End: 2022-01-01

## 2022-01-01 ENCOUNTER — APPOINTMENT (OUTPATIENT)
Dept: GENERAL RADIOLOGY | Facility: CLINIC | Age: 72
DRG: 463 | End: 2022-01-01
Attending: ORTHOPAEDIC SURGERY
Payer: COMMERCIAL

## 2022-01-01 VITALS
HEART RATE: 84 BPM | OXYGEN SATURATION: 95 % | RESPIRATION RATE: 16 BRPM | BODY MASS INDEX: 45.1 KG/M2 | TEMPERATURE: 98.2 F | DIASTOLIC BLOOD PRESSURE: 68 MMHG | WEIGHT: 315 LBS | HEIGHT: 70 IN | SYSTOLIC BLOOD PRESSURE: 108 MMHG

## 2022-01-01 VITALS — HEIGHT: 70 IN | BODY MASS INDEX: 45.1 KG/M2 | WEIGHT: 315 LBS

## 2022-01-01 VITALS
DIASTOLIC BLOOD PRESSURE: 64 MMHG | OXYGEN SATURATION: 92 % | HEART RATE: 85 BPM | TEMPERATURE: 97.3 F | SYSTOLIC BLOOD PRESSURE: 102 MMHG

## 2022-01-01 VITALS
DIASTOLIC BLOOD PRESSURE: 68 MMHG | SYSTOLIC BLOOD PRESSURE: 135 MMHG | BODY MASS INDEX: 45.1 KG/M2 | OXYGEN SATURATION: 94 % | RESPIRATION RATE: 16 BRPM | HEIGHT: 70 IN | WEIGHT: 315 LBS | HEART RATE: 70 BPM | TEMPERATURE: 97.8 F

## 2022-01-01 DIAGNOSIS — T84.50XA PROSTHETIC JOINT INFECTION, INITIAL ENCOUNTER (H): Primary | ICD-10-CM

## 2022-01-01 DIAGNOSIS — T84.50XA PROSTHETIC JOINT INFECTION, INITIAL ENCOUNTER (H): ICD-10-CM

## 2022-01-01 DIAGNOSIS — T84.52XD INFECTION ASSOCIATED WITH INTERNAL LEFT HIP PROSTHESIS, SUBSEQUENT ENCOUNTER: ICD-10-CM

## 2022-01-01 DIAGNOSIS — A49.8 PROTEUS MIRABILIS INFECTION: ICD-10-CM

## 2022-01-01 DIAGNOSIS — A49.9 POLYMICROBIAL BACTERIAL INFECTION: ICD-10-CM

## 2022-01-01 DIAGNOSIS — Z01.818 PREOP EXAMINATION: Primary | ICD-10-CM

## 2022-01-01 DIAGNOSIS — Z92.89 HISTORY OF BLOOD TRANSFUSION: ICD-10-CM

## 2022-01-01 DIAGNOSIS — T84.52XD INFECTION ASSOCIATED WITH INTERNAL LEFT HIP PROSTHESIS, SUBSEQUENT ENCOUNTER: Primary | ICD-10-CM

## 2022-01-01 DIAGNOSIS — Z20.822 ENCOUNTER FOR LABORATORY TESTING FOR COVID-19 VIRUS: ICD-10-CM

## 2022-01-01 DIAGNOSIS — Z01.818 PREOP EXAMINATION: ICD-10-CM

## 2022-01-01 DIAGNOSIS — Z79.2 LONG TERM (CURRENT) USE OF ANTIBIOTICS: ICD-10-CM

## 2022-01-01 DIAGNOSIS — B49 FUNGAL INFECTION: ICD-10-CM

## 2022-01-01 DIAGNOSIS — M70.72 ILIOPSOAS BURSITIS OF LEFT HIP: Primary | ICD-10-CM

## 2022-01-01 LAB
ABO/RH(D): NORMAL
ABO/RH(D): NORMAL
ALBUMIN SERPL-MCNC: 2.1 G/DL (ref 3.4–5)
ALBUMIN SERPL-MCNC: 2.4 G/DL (ref 3.4–5)
ALP SERPL-CCNC: 56 U/L (ref 40–150)
ALP SERPL-CCNC: 74 U/L (ref 40–150)
ALT SERPL W P-5'-P-CCNC: 19 U/L (ref 0–70)
ALT SERPL W P-5'-P-CCNC: 9 U/L (ref 0–70)
ANION GAP SERPL CALCULATED.3IONS-SCNC: 11 MMOL/L (ref 7–15)
ANION GAP SERPL CALCULATED.3IONS-SCNC: 2 MMOL/L (ref 3–14)
ANION GAP SERPL CALCULATED.3IONS-SCNC: 2 MMOL/L (ref 3–14)
ANION GAP SERPL CALCULATED.3IONS-SCNC: 3 MMOL/L (ref 3–14)
ANION GAP SERPL CALCULATED.3IONS-SCNC: 4 MMOL/L (ref 3–14)
ANION GAP SERPL CALCULATED.3IONS-SCNC: 4 MMOL/L (ref 3–14)
ANION GAP SERPL CALCULATED.3IONS-SCNC: 5 MMOL/L (ref 3–14)
ANION GAP SERPL CALCULATED.3IONS-SCNC: 6 MMOL/L (ref 3–14)
ANION GAP SERPL CALCULATED.3IONS-SCNC: 6 MMOL/L (ref 3–14)
ANION GAP SERPL CALCULATED.3IONS-SCNC: 7 MMOL/L (ref 3–14)
ANTIBODY SCREEN: NEGATIVE
ANTIBODY SCREEN: NEGATIVE
AST SERPL W P-5'-P-CCNC: 48 U/L (ref 0–45)
AST SERPL W P-5'-P-CCNC: 7 U/L (ref 0–45)
ATRIAL RATE - MUSE: 65 BPM
BACTERIA ASPIRATE CULT: ABNORMAL
BACTERIA ASPIRATE CULT: NO GROWTH
BACTERIA TISS BX CULT: ABNORMAL
BACTERIA TISS BX CULT: NO GROWTH
BACTERIA TISS BX CULT: NORMAL
BASE EXCESS BLDV CALC-SCNC: -0.5 MMOL/L (ref -8.1–1.9)
BASE EXCESS BLDV CALC-SCNC: -1 MMOL/L (ref -8.1–1.9)
BASE EXCESS BLDV CALC-SCNC: -1.2 MMOL/L (ref -8.1–1.9)
BASE EXCESS BLDV CALC-SCNC: 2.5 MMOL/L (ref -7.7–1.9)
BASOPHILS # BLD AUTO: 0.1 10E3/UL (ref 0–0.2)
BASOPHILS NFR BLD AUTO: 1 %
BILIRUB SERPL-MCNC: 0.2 MG/DL (ref 0.2–1.3)
BILIRUB SERPL-MCNC: 0.3 MG/DL (ref 0.2–1.3)
BLD PROD TYP BPU: NORMAL
BLOOD BANK CHART COMMENT: NORMAL
BLOOD COMPONENT TYPE: NORMAL
BUN SERPL-MCNC: 17 MG/DL (ref 7–30)
BUN SERPL-MCNC: 17 MG/DL (ref 7–30)
BUN SERPL-MCNC: 18 MG/DL (ref 7–30)
BUN SERPL-MCNC: 20 MG/DL (ref 7–30)
BUN SERPL-MCNC: 21 MG/DL (ref 7–30)
BUN SERPL-MCNC: 26 MG/DL (ref 7–30)
BUN SERPL-MCNC: 26 MG/DL (ref 7–30)
BUN SERPL-MCNC: 28 MG/DL (ref 7–30)
BUN SERPL-MCNC: 30 MG/DL (ref 7–30)
BUN SERPL-MCNC: 30 MG/DL (ref 7–30)
BUN SERPL-MCNC: 33.7 MG/DL (ref 8–23)
CA-I BLD-MCNC: 4.3 MG/DL (ref 4.4–5.2)
CA-I BLD-MCNC: 4.3 MG/DL (ref 4.4–5.2)
CA-I BLD-MCNC: 4.7 MG/DL (ref 4.4–5.2)
CA-I BLD-MCNC: 4.7 MG/DL (ref 4.4–5.2)
CALCIUM SERPL-MCNC: 7.6 MG/DL (ref 8.5–10.1)
CALCIUM SERPL-MCNC: 8 MG/DL (ref 8.5–10.1)
CALCIUM SERPL-MCNC: 8.2 MG/DL (ref 8.5–10.1)
CALCIUM SERPL-MCNC: 8.2 MG/DL (ref 8.5–10.1)
CALCIUM SERPL-MCNC: 8.3 MG/DL (ref 8.5–10.1)
CALCIUM SERPL-MCNC: 8.4 MG/DL (ref 8.5–10.1)
CALCIUM SERPL-MCNC: 8.6 MG/DL (ref 8.5–10.1)
CALCIUM SERPL-MCNC: 8.8 MG/DL (ref 8.8–10.2)
CHLORIDE BLD-SCNC: 106 MMOL/L (ref 94–109)
CHLORIDE BLD-SCNC: 107 MMOL/L (ref 94–109)
CHLORIDE BLD-SCNC: 108 MMOL/L (ref 94–109)
CHLORIDE BLD-SCNC: 109 MMOL/L (ref 94–109)
CHLORIDE SERPL-SCNC: 101 MMOL/L (ref 98–107)
CO2 SERPL-SCNC: 23 MMOL/L (ref 20–32)
CO2 SERPL-SCNC: 25 MMOL/L (ref 20–32)
CO2 SERPL-SCNC: 26 MMOL/L (ref 20–32)
CO2 SERPL-SCNC: 26 MMOL/L (ref 20–32)
CO2 SERPL-SCNC: 27 MMOL/L (ref 20–32)
CO2 SERPL-SCNC: 28 MMOL/L (ref 20–32)
CO2 SERPL-SCNC: 28 MMOL/L (ref 20–32)
CO2 SERPL-SCNC: 29 MMOL/L (ref 20–32)
CO2 SERPL-SCNC: 29 MMOL/L (ref 20–32)
CO2 SERPL-SCNC: 30 MMOL/L (ref 20–32)
CO2 SERPL-SCNC: 31 MMOL/L (ref 20–32)
CO2 SERPL-SCNC: 31 MMOL/L (ref 20–32)
CODING SYSTEM: NORMAL
CREAT SERPL-MCNC: 0.91 MG/DL (ref 0.66–1.25)
CREAT SERPL-MCNC: 0.92 MG/DL (ref 0.66–1.25)
CREAT SERPL-MCNC: 0.97 MG/DL (ref 0.66–1.25)
CREAT SERPL-MCNC: 0.97 MG/DL (ref 0.66–1.25)
CREAT SERPL-MCNC: 0.99 MG/DL (ref 0.66–1.25)
CREAT SERPL-MCNC: 0.99 MG/DL (ref 0.66–1.25)
CREAT SERPL-MCNC: 1.01 MG/DL (ref 0.66–1.25)
CREAT SERPL-MCNC: 1.02 MG/DL (ref 0.66–1.25)
CREAT SERPL-MCNC: 1.06 MG/DL (ref 0.66–1.25)
CREAT SERPL-MCNC: 1.08 MG/DL (ref 0.66–1.25)
CREAT SERPL-MCNC: 1.08 MG/DL (ref 0.66–1.25)
CREAT SERPL-MCNC: 1.09 MG/DL (ref 0.66–1.25)
CREAT SERPL-MCNC: 1.11 MG/DL (ref 0.66–1.25)
CREAT SERPL-MCNC: 1.19 MG/DL (ref 0.66–1.25)
CREAT SERPL-MCNC: 1.26 MG/DL (ref 0.66–1.25)
CREAT SERPL-MCNC: 1.27 MG/DL (ref 0.66–1.25)
CREAT SERPL-MCNC: 1.28 MG/DL (ref 0.66–1.25)
CREAT SERPL-MCNC: 1.29 MG/DL (ref 0.66–1.25)
CREAT SERPL-MCNC: 1.29 MG/DL (ref 0.66–1.25)
CREAT SERPL-MCNC: 1.36 MG/DL (ref 0.67–1.17)
CROSSMATCH: NORMAL
CRP SERPL-MCNC: 120 MG/L (ref 0–8)
CRP SERPL-MCNC: 44 MG/L (ref 0–8)
CRP SERPL-MCNC: 46 MG/L (ref 0–8)
CRP SERPL-MCNC: 6.1 MG/L (ref 0–8)
CRP SERPL-MCNC: <2.9 MG/L (ref 0–8)
DEPRECATED HCO3 PLAS-SCNC: 25 MMOL/L (ref 22–29)
DIASTOLIC BLOOD PRESSURE - MUSE: NORMAL MMHG
EOSINOPHIL # BLD AUTO: 0.5 10E3/UL (ref 0–0.7)
EOSINOPHIL NFR BLD AUTO: 6 %
ERYTHROCYTE [DISTWIDTH] IN BLOOD BY AUTOMATED COUNT: 13.7 % (ref 10–15)
ERYTHROCYTE [DISTWIDTH] IN BLOOD BY AUTOMATED COUNT: 14.1 % (ref 10–15)
ERYTHROCYTE [DISTWIDTH] IN BLOOD BY AUTOMATED COUNT: 14.6 % (ref 10–15)
ERYTHROCYTE [DISTWIDTH] IN BLOOD BY AUTOMATED COUNT: 14.7 % (ref 10–15)
ERYTHROCYTE [DISTWIDTH] IN BLOOD BY AUTOMATED COUNT: 14.8 % (ref 10–15)
ERYTHROCYTE [DISTWIDTH] IN BLOOD BY AUTOMATED COUNT: 14.8 % (ref 10–15)
ERYTHROCYTE [DISTWIDTH] IN BLOOD BY AUTOMATED COUNT: 15 % (ref 10–15)
ERYTHROCYTE [DISTWIDTH] IN BLOOD BY AUTOMATED COUNT: 15 % (ref 10–15)
ERYTHROCYTE [DISTWIDTH] IN BLOOD BY AUTOMATED COUNT: 15.1 % (ref 10–15)
ERYTHROCYTE [DISTWIDTH] IN BLOOD BY AUTOMATED COUNT: 15.1 % (ref 10–15)
ERYTHROCYTE [DISTWIDTH] IN BLOOD BY AUTOMATED COUNT: 15.2 % (ref 10–15)
ERYTHROCYTE [SEDIMENTATION RATE] IN BLOOD BY WESTERGREN METHOD: 86 MM/HR (ref 0–20)
GAMMA INTERFERON BACKGROUND BLD IA-ACNC: 0.05 IU/ML
GFR SERPL CREATININE-BSD FRML MDRD: 56 ML/MIN/1.73M2
GFR SERPL CREATININE-BSD FRML MDRD: 59 ML/MIN/1.73M2
GFR SERPL CREATININE-BSD FRML MDRD: 59 ML/MIN/1.73M2
GFR SERPL CREATININE-BSD FRML MDRD: 60 ML/MIN/1.73M2
GFR SERPL CREATININE-BSD FRML MDRD: 60 ML/MIN/1.73M2
GFR SERPL CREATININE-BSD FRML MDRD: 61 ML/MIN/1.73M2
GFR SERPL CREATININE-BSD FRML MDRD: 65 ML/MIN/1.73M2
GFR SERPL CREATININE-BSD FRML MDRD: 71 ML/MIN/1.73M2
GFR SERPL CREATININE-BSD FRML MDRD: 73 ML/MIN/1.73M2
GFR SERPL CREATININE-BSD FRML MDRD: 75 ML/MIN/1.73M2
GFR SERPL CREATININE-BSD FRML MDRD: 79 ML/MIN/1.73M2
GFR SERPL CREATININE-BSD FRML MDRD: 80 ML/MIN/1.73M2
GFR SERPL CREATININE-BSD FRML MDRD: 81 ML/MIN/1.73M2
GFR SERPL CREATININE-BSD FRML MDRD: 81 ML/MIN/1.73M2
GFR SERPL CREATININE-BSD FRML MDRD: 83 ML/MIN/1.73M2
GFR SERPL CREATININE-BSD FRML MDRD: 83 ML/MIN/1.73M2
GFR SERPL CREATININE-BSD FRML MDRD: 89 ML/MIN/1.73M2
GFR SERPL CREATININE-BSD FRML MDRD: 90 ML/MIN/1.73M2
GLUCOSE BLD-MCNC: 124 MG/DL (ref 70–99)
GLUCOSE BLD-MCNC: 127 MG/DL (ref 70–99)
GLUCOSE BLD-MCNC: 153 MG/DL (ref 70–99)
GLUCOSE BLD-MCNC: 156 MG/DL (ref 70–99)
GLUCOSE BLD-MCNC: 163 MG/DL (ref 70–99)
GLUCOSE BLD-MCNC: 168 MG/DL (ref 70–99)
GLUCOSE BLD-MCNC: 175 MG/DL (ref 70–99)
GLUCOSE BLD-MCNC: 185 MG/DL (ref 70–99)
GLUCOSE BLD-MCNC: 189 MG/DL (ref 70–99)
GLUCOSE BLD-MCNC: 201 MG/DL (ref 70–99)
GLUCOSE BLD-MCNC: 205 MG/DL (ref 70–99)
GLUCOSE BLD-MCNC: 215 MG/DL (ref 70–99)
GLUCOSE BLD-MCNC: 217 MG/DL (ref 70–99)
GLUCOSE BLD-MCNC: 217 MG/DL (ref 70–99)
GLUCOSE BLD-MCNC: 258 MG/DL (ref 70–99)
GLUCOSE BLDC GLUCOMTR-MCNC: 107 MG/DL (ref 70–99)
GLUCOSE BLDC GLUCOMTR-MCNC: 108 MG/DL (ref 70–99)
GLUCOSE BLDC GLUCOMTR-MCNC: 110 MG/DL (ref 70–99)
GLUCOSE BLDC GLUCOMTR-MCNC: 112 MG/DL (ref 70–99)
GLUCOSE BLDC GLUCOMTR-MCNC: 113 MG/DL (ref 70–99)
GLUCOSE BLDC GLUCOMTR-MCNC: 113 MG/DL (ref 70–99)
GLUCOSE BLDC GLUCOMTR-MCNC: 116 MG/DL (ref 70–99)
GLUCOSE BLDC GLUCOMTR-MCNC: 117 MG/DL (ref 70–99)
GLUCOSE BLDC GLUCOMTR-MCNC: 120 MG/DL (ref 70–99)
GLUCOSE BLDC GLUCOMTR-MCNC: 121 MG/DL (ref 70–99)
GLUCOSE BLDC GLUCOMTR-MCNC: 122 MG/DL (ref 70–99)
GLUCOSE BLDC GLUCOMTR-MCNC: 126 MG/DL (ref 70–99)
GLUCOSE BLDC GLUCOMTR-MCNC: 129 MG/DL (ref 70–99)
GLUCOSE BLDC GLUCOMTR-MCNC: 131 MG/DL (ref 70–99)
GLUCOSE BLDC GLUCOMTR-MCNC: 131 MG/DL (ref 70–99)
GLUCOSE BLDC GLUCOMTR-MCNC: 132 MG/DL (ref 70–99)
GLUCOSE BLDC GLUCOMTR-MCNC: 134 MG/DL (ref 70–99)
GLUCOSE BLDC GLUCOMTR-MCNC: 134 MG/DL (ref 70–99)
GLUCOSE BLDC GLUCOMTR-MCNC: 135 MG/DL (ref 70–99)
GLUCOSE BLDC GLUCOMTR-MCNC: 136 MG/DL (ref 70–99)
GLUCOSE BLDC GLUCOMTR-MCNC: 136 MG/DL (ref 70–99)
GLUCOSE BLDC GLUCOMTR-MCNC: 138 MG/DL (ref 70–99)
GLUCOSE BLDC GLUCOMTR-MCNC: 139 MG/DL (ref 70–99)
GLUCOSE BLDC GLUCOMTR-MCNC: 140 MG/DL (ref 70–99)
GLUCOSE BLDC GLUCOMTR-MCNC: 140 MG/DL (ref 70–99)
GLUCOSE BLDC GLUCOMTR-MCNC: 141 MG/DL (ref 70–99)
GLUCOSE BLDC GLUCOMTR-MCNC: 141 MG/DL (ref 70–99)
GLUCOSE BLDC GLUCOMTR-MCNC: 142 MG/DL (ref 70–99)
GLUCOSE BLDC GLUCOMTR-MCNC: 143 MG/DL (ref 70–99)
GLUCOSE BLDC GLUCOMTR-MCNC: 144 MG/DL (ref 70–99)
GLUCOSE BLDC GLUCOMTR-MCNC: 145 MG/DL (ref 70–99)
GLUCOSE BLDC GLUCOMTR-MCNC: 145 MG/DL (ref 70–99)
GLUCOSE BLDC GLUCOMTR-MCNC: 146 MG/DL (ref 70–99)
GLUCOSE BLDC GLUCOMTR-MCNC: 146 MG/DL (ref 70–99)
GLUCOSE BLDC GLUCOMTR-MCNC: 147 MG/DL (ref 70–99)
GLUCOSE BLDC GLUCOMTR-MCNC: 149 MG/DL (ref 70–99)
GLUCOSE BLDC GLUCOMTR-MCNC: 149 MG/DL (ref 70–99)
GLUCOSE BLDC GLUCOMTR-MCNC: 150 MG/DL (ref 70–99)
GLUCOSE BLDC GLUCOMTR-MCNC: 150 MG/DL (ref 70–99)
GLUCOSE BLDC GLUCOMTR-MCNC: 151 MG/DL (ref 70–99)
GLUCOSE BLDC GLUCOMTR-MCNC: 153 MG/DL (ref 70–99)
GLUCOSE BLDC GLUCOMTR-MCNC: 154 MG/DL (ref 70–99)
GLUCOSE BLDC GLUCOMTR-MCNC: 155 MG/DL (ref 70–99)
GLUCOSE BLDC GLUCOMTR-MCNC: 156 MG/DL (ref 70–99)
GLUCOSE BLDC GLUCOMTR-MCNC: 156 MG/DL (ref 70–99)
GLUCOSE BLDC GLUCOMTR-MCNC: 158 MG/DL (ref 70–99)
GLUCOSE BLDC GLUCOMTR-MCNC: 160 MG/DL (ref 70–99)
GLUCOSE BLDC GLUCOMTR-MCNC: 161 MG/DL (ref 70–99)
GLUCOSE BLDC GLUCOMTR-MCNC: 163 MG/DL (ref 70–99)
GLUCOSE BLDC GLUCOMTR-MCNC: 163 MG/DL (ref 70–99)
GLUCOSE BLDC GLUCOMTR-MCNC: 164 MG/DL (ref 70–99)
GLUCOSE BLDC GLUCOMTR-MCNC: 166 MG/DL (ref 70–99)
GLUCOSE BLDC GLUCOMTR-MCNC: 166 MG/DL (ref 70–99)
GLUCOSE BLDC GLUCOMTR-MCNC: 167 MG/DL (ref 70–99)
GLUCOSE BLDC GLUCOMTR-MCNC: 168 MG/DL (ref 70–99)
GLUCOSE BLDC GLUCOMTR-MCNC: 169 MG/DL (ref 70–99)
GLUCOSE BLDC GLUCOMTR-MCNC: 170 MG/DL (ref 70–99)
GLUCOSE BLDC GLUCOMTR-MCNC: 170 MG/DL (ref 70–99)
GLUCOSE BLDC GLUCOMTR-MCNC: 172 MG/DL (ref 70–99)
GLUCOSE BLDC GLUCOMTR-MCNC: 173 MG/DL (ref 70–99)
GLUCOSE BLDC GLUCOMTR-MCNC: 174 MG/DL (ref 70–99)
GLUCOSE BLDC GLUCOMTR-MCNC: 176 MG/DL (ref 70–99)
GLUCOSE BLDC GLUCOMTR-MCNC: 176 MG/DL (ref 70–99)
GLUCOSE BLDC GLUCOMTR-MCNC: 178 MG/DL (ref 70–99)
GLUCOSE BLDC GLUCOMTR-MCNC: 178 MG/DL (ref 70–99)
GLUCOSE BLDC GLUCOMTR-MCNC: 179 MG/DL (ref 70–99)
GLUCOSE BLDC GLUCOMTR-MCNC: 182 MG/DL (ref 70–99)
GLUCOSE BLDC GLUCOMTR-MCNC: 184 MG/DL (ref 70–99)
GLUCOSE BLDC GLUCOMTR-MCNC: 185 MG/DL (ref 70–99)
GLUCOSE BLDC GLUCOMTR-MCNC: 185 MG/DL (ref 70–99)
GLUCOSE BLDC GLUCOMTR-MCNC: 186 MG/DL (ref 70–99)
GLUCOSE BLDC GLUCOMTR-MCNC: 186 MG/DL (ref 70–99)
GLUCOSE BLDC GLUCOMTR-MCNC: 187 MG/DL (ref 70–99)
GLUCOSE BLDC GLUCOMTR-MCNC: 189 MG/DL (ref 70–99)
GLUCOSE BLDC GLUCOMTR-MCNC: 190 MG/DL (ref 70–99)
GLUCOSE BLDC GLUCOMTR-MCNC: 191 MG/DL (ref 70–99)
GLUCOSE BLDC GLUCOMTR-MCNC: 192 MG/DL (ref 70–99)
GLUCOSE BLDC GLUCOMTR-MCNC: 192 MG/DL (ref 70–99)
GLUCOSE BLDC GLUCOMTR-MCNC: 193 MG/DL (ref 70–99)
GLUCOSE BLDC GLUCOMTR-MCNC: 198 MG/DL (ref 70–99)
GLUCOSE BLDC GLUCOMTR-MCNC: 198 MG/DL (ref 70–99)
GLUCOSE BLDC GLUCOMTR-MCNC: 199 MG/DL (ref 70–99)
GLUCOSE BLDC GLUCOMTR-MCNC: 200 MG/DL (ref 70–99)
GLUCOSE BLDC GLUCOMTR-MCNC: 202 MG/DL (ref 70–99)
GLUCOSE BLDC GLUCOMTR-MCNC: 202 MG/DL (ref 70–99)
GLUCOSE BLDC GLUCOMTR-MCNC: 206 MG/DL (ref 70–99)
GLUCOSE BLDC GLUCOMTR-MCNC: 206 MG/DL (ref 70–99)
GLUCOSE BLDC GLUCOMTR-MCNC: 207 MG/DL (ref 70–99)
GLUCOSE BLDC GLUCOMTR-MCNC: 208 MG/DL (ref 70–99)
GLUCOSE BLDC GLUCOMTR-MCNC: 209 MG/DL (ref 70–99)
GLUCOSE BLDC GLUCOMTR-MCNC: 210 MG/DL (ref 70–99)
GLUCOSE BLDC GLUCOMTR-MCNC: 213 MG/DL (ref 70–99)
GLUCOSE BLDC GLUCOMTR-MCNC: 214 MG/DL (ref 70–99)
GLUCOSE BLDC GLUCOMTR-MCNC: 215 MG/DL (ref 70–99)
GLUCOSE BLDC GLUCOMTR-MCNC: 215 MG/DL (ref 70–99)
GLUCOSE BLDC GLUCOMTR-MCNC: 222 MG/DL (ref 70–99)
GLUCOSE BLDC GLUCOMTR-MCNC: 225 MG/DL (ref 70–99)
GLUCOSE BLDC GLUCOMTR-MCNC: 225 MG/DL (ref 70–99)
GLUCOSE BLDC GLUCOMTR-MCNC: 230 MG/DL (ref 70–99)
GLUCOSE BLDC GLUCOMTR-MCNC: 232 MG/DL (ref 70–99)
GLUCOSE BLDC GLUCOMTR-MCNC: 232 MG/DL (ref 70–99)
GLUCOSE BLDC GLUCOMTR-MCNC: 235 MG/DL (ref 70–99)
GLUCOSE BLDC GLUCOMTR-MCNC: 245 MG/DL (ref 70–99)
GLUCOSE BLDC GLUCOMTR-MCNC: 248 MG/DL (ref 70–99)
GLUCOSE BLDC GLUCOMTR-MCNC: 249 MG/DL (ref 70–99)
GLUCOSE BLDC GLUCOMTR-MCNC: 250 MG/DL (ref 70–99)
GLUCOSE BLDC GLUCOMTR-MCNC: 260 MG/DL (ref 70–99)
GLUCOSE BLDC GLUCOMTR-MCNC: 261 MG/DL (ref 70–99)
GLUCOSE BLDC GLUCOMTR-MCNC: 262 MG/DL (ref 70–99)
GLUCOSE BLDC GLUCOMTR-MCNC: 267 MG/DL (ref 70–99)
GLUCOSE BLDC GLUCOMTR-MCNC: 269 MG/DL (ref 70–99)
GLUCOSE BLDC GLUCOMTR-MCNC: 278 MG/DL (ref 70–99)
GLUCOSE BLDC GLUCOMTR-MCNC: 279 MG/DL (ref 70–99)
GLUCOSE BLDC GLUCOMTR-MCNC: 282 MG/DL (ref 70–99)
GLUCOSE BLDC GLUCOMTR-MCNC: 293 MG/DL (ref 70–99)
GLUCOSE BLDC GLUCOMTR-MCNC: 302 MG/DL (ref 70–99)
GLUCOSE BLDC GLUCOMTR-MCNC: 314 MG/DL (ref 70–99)
GLUCOSE SERPL-MCNC: 103 MG/DL (ref 70–99)
GRAM STAIN RESULT: ABNORMAL
HBA1C MFR BLD: 6.8 % (ref 0–5.6)
HCO3 BLDV-SCNC: 26 MMOL/L (ref 21–28)
HCO3 BLDV-SCNC: 26 MMOL/L (ref 21–28)
HCO3 BLDV-SCNC: 27 MMOL/L (ref 21–28)
HCO3 BLDV-SCNC: 28 MMOL/L (ref 21–28)
HCT VFR BLD AUTO: 19.9 % (ref 40–53)
HCT VFR BLD AUTO: 24.5 % (ref 40–53)
HCT VFR BLD AUTO: 25.5 % (ref 40–53)
HCT VFR BLD AUTO: 26.3 % (ref 40–53)
HCT VFR BLD AUTO: 28.1 % (ref 40–53)
HCT VFR BLD AUTO: 30.3 % (ref 40–53)
HCT VFR BLD AUTO: 31.2 % (ref 40–53)
HCT VFR BLD AUTO: 31.7 % (ref 40–53)
HCT VFR BLD AUTO: 31.9 % (ref 40–53)
HCT VFR BLD AUTO: 34.4 % (ref 40–53)
HCT VFR BLD AUTO: 34.7 % (ref 40–53)
HGB BLD-MCNC: 10.8 G/DL (ref 13.3–17.7)
HGB BLD-MCNC: 11.1 G/DL (ref 13.3–17.7)
HGB BLD-MCNC: 6.3 G/DL (ref 13.3–17.7)
HGB BLD-MCNC: 6.6 G/DL (ref 13.3–17.7)
HGB BLD-MCNC: 6.8 G/DL (ref 13.3–17.7)
HGB BLD-MCNC: 6.8 G/DL (ref 13.3–17.7)
HGB BLD-MCNC: 7.1 G/DL (ref 13.3–17.7)
HGB BLD-MCNC: 7.5 G/DL (ref 13.3–17.7)
HGB BLD-MCNC: 7.5 G/DL (ref 13.3–17.7)
HGB BLD-MCNC: 7.6 G/DL (ref 13.3–17.7)
HGB BLD-MCNC: 7.8 G/DL (ref 13.3–17.7)
HGB BLD-MCNC: 8.2 G/DL (ref 13.3–17.7)
HGB BLD-MCNC: 8.7 G/DL (ref 13.3–17.7)
HGB BLD-MCNC: 9.2 G/DL (ref 13.3–17.7)
HGB BLD-MCNC: 9.4 G/DL (ref 13.3–17.7)
HGB BLD-MCNC: 9.6 G/DL (ref 13.3–17.7)
HGB BLD-MCNC: 9.6 G/DL (ref 13.3–17.7)
HGB BLD-MCNC: 9.7 G/DL (ref 13.3–17.7)
HGB BLD-MCNC: 9.8 G/DL (ref 13.3–17.7)
HGB BLD-MCNC: 9.9 G/DL (ref 13.3–17.7)
HOLD SPECIMEN: NORMAL
IMM GRANULOCYTES # BLD: 0.1 10E3/UL
IMM GRANULOCYTES NFR BLD: 1 %
INTERPRETATION ECG - MUSE: NORMAL
ISSUE DATE AND TIME: NORMAL
LACTATE BLD-SCNC: 0.9 MMOL/L
LACTATE BLD-SCNC: 0.9 MMOL/L
LACTATE BLD-SCNC: 1.3 MMOL/L
LYMPHOCYTES # BLD AUTO: 2.4 10E3/UL (ref 0.8–5.3)
LYMPHOCYTES NFR BLD AUTO: 28 %
M TB IFN-G BLD-IMP: NEGATIVE
M TB IFN-G CD4+ BCKGRND COR BLD-ACNC: 9.95 IU/ML
MCH RBC QN AUTO: 28.3 PG (ref 26.5–33)
MCH RBC QN AUTO: 28.5 PG (ref 26.5–33)
MCH RBC QN AUTO: 28.5 PG (ref 26.5–33)
MCH RBC QN AUTO: 28.6 PG (ref 26.5–33)
MCH RBC QN AUTO: 28.6 PG (ref 26.5–33)
MCH RBC QN AUTO: 29.2 PG (ref 26.5–33)
MCH RBC QN AUTO: 29.2 PG (ref 26.5–33)
MCH RBC QN AUTO: 29.4 PG (ref 26.5–33)
MCH RBC QN AUTO: 29.5 PG (ref 26.5–33)
MCH RBC QN AUTO: 29.7 PG (ref 26.5–33)
MCH RBC QN AUTO: 30.1 PG (ref 26.5–33)
MCHC RBC AUTO-ENTMCNC: 30.6 G/DL (ref 31.5–36.5)
MCHC RBC AUTO-ENTMCNC: 30.8 G/DL (ref 31.5–36.5)
MCHC RBC AUTO-ENTMCNC: 31 G/DL (ref 31.5–36.5)
MCHC RBC AUTO-ENTMCNC: 31.1 G/DL (ref 31.5–36.5)
MCHC RBC AUTO-ENTMCNC: 31.2 G/DL (ref 31.5–36.5)
MCHC RBC AUTO-ENTMCNC: 31.7 G/DL (ref 31.5–36.5)
MCHC RBC AUTO-ENTMCNC: 32.3 G/DL (ref 31.5–36.5)
MCV RBC AUTO: 88 FL (ref 78–100)
MCV RBC AUTO: 91 FL (ref 78–100)
MCV RBC AUTO: 92 FL (ref 78–100)
MCV RBC AUTO: 93 FL (ref 78–100)
MCV RBC AUTO: 94 FL (ref 78–100)
MCV RBC AUTO: 94 FL (ref 78–100)
MCV RBC AUTO: 95 FL (ref 78–100)
MCV RBC AUTO: 97 FL (ref 78–100)
MITOGEN IGNF BCKGRD COR BLD-ACNC: 0 IU/ML
MITOGEN IGNF BCKGRD COR BLD-ACNC: 0 IU/ML
MONOCYTES # BLD AUTO: 0.9 10E3/UL (ref 0–1.3)
MONOCYTES NFR BLD AUTO: 11 %
NEUTROPHILS # BLD AUTO: 4.5 10E3/UL (ref 1.6–8.3)
NEUTROPHILS NFR BLD AUTO: 53 %
NRBC # BLD AUTO: 0 10E3/UL
NRBC BLD AUTO-RTO: 0 /100
NT-PROBNP SERPL-MCNC: 1152 PG/ML (ref 0–900)
O2/TOTAL GAS SETTING VFR VENT: 32 %
O2/TOTAL GAS SETTING VFR VENT: 40 %
O2/TOTAL GAS SETTING VFR VENT: 47 %
O2/TOTAL GAS SETTING VFR VENT: 90 %
P AXIS - MUSE: 65 DEGREES
PCO2 BLDV: 45 MM HG (ref 40–50)
PCO2 BLDV: 52 MM HG (ref 40–50)
PCO2 BLDV: 54 MM HG (ref 40–50)
PCO2 BLDV: 59 MM HG (ref 40–50)
PH BLDV: 7.27 [PH] (ref 7.32–7.43)
PH BLDV: 7.29 [PH] (ref 7.32–7.43)
PH BLDV: 7.3 [PH] (ref 7.32–7.43)
PH BLDV: 7.39 [PH] (ref 7.32–7.43)
PLATELET # BLD AUTO: 245 10E3/UL (ref 150–450)
PLATELET # BLD AUTO: 278 10E3/UL (ref 150–450)
PLATELET # BLD AUTO: 325 10E3/UL (ref 150–450)
PLATELET # BLD AUTO: 331 10E3/UL (ref 150–450)
PLATELET # BLD AUTO: 344 10E3/UL (ref 150–450)
PLATELET # BLD AUTO: 365 10E3/UL (ref 150–450)
PLATELET # BLD AUTO: 394 10E3/UL (ref 150–450)
PLATELET # BLD AUTO: 398 10E3/UL (ref 150–450)
PLATELET # BLD AUTO: 421 10E3/UL (ref 150–450)
PLATELET # BLD AUTO: 483 10E3/UL (ref 150–450)
PLATELET # BLD AUTO: 589 10E3/UL (ref 150–450)
PO2 BLDV: 52 MM HG (ref 25–47)
PO2 BLDV: 72 MM HG (ref 25–47)
PO2 BLDV: 82 MM HG (ref 25–47)
PO2 BLDV: 90 MM HG (ref 25–47)
POTASSIUM BLD-SCNC: 3.8 MMOL/L (ref 3.4–5.3)
POTASSIUM BLD-SCNC: 4.1 MMOL/L (ref 3.4–5.3)
POTASSIUM BLD-SCNC: 4.2 MMOL/L (ref 3.4–5.3)
POTASSIUM BLD-SCNC: 4.3 MMOL/L (ref 3.4–5.3)
POTASSIUM BLD-SCNC: 4.3 MMOL/L (ref 3.4–5.3)
POTASSIUM BLD-SCNC: 4.4 MMOL/L (ref 3.4–5.3)
POTASSIUM BLD-SCNC: 4.5 MMOL/L (ref 3.4–5.3)
POTASSIUM BLD-SCNC: 4.5 MMOL/L (ref 3.4–5.3)
POTASSIUM BLD-SCNC: 4.7 MMOL/L (ref 3.5–5)
POTASSIUM BLD-SCNC: 4.8 MMOL/L (ref 3.4–5.3)
POTASSIUM BLD-SCNC: 5 MMOL/L (ref 3.5–5)
POTASSIUM BLD-SCNC: 5.3 MMOL/L (ref 3.5–5)
POTASSIUM BLD-SCNC: 5.8 MMOL/L (ref 3.4–5.3)
POTASSIUM SERPL-SCNC: 4.2 MMOL/L (ref 3.4–5.3)
PR INTERVAL - MUSE: 326 MS
PROT SERPL-MCNC: 5.6 G/DL (ref 6.8–8.8)
PROT SERPL-MCNC: 6.1 G/DL (ref 6.8–8.8)
QRS DURATION - MUSE: 106 MS
QT - MUSE: 426 MS
QTC - MUSE: 443 MS
QUANTIFERON MITOGEN: 10 IU/ML
QUANTIFERON NIL TUBE: 0.05 IU/ML
QUANTIFERON TB1 TUBE: 0.05 IU/ML
QUANTIFERON TB2 TUBE: 0.05
R AXIS - MUSE: -9 DEGREES
RBC # BLD AUTO: 2.09 10E6/UL (ref 4.4–5.9)
RBC # BLD AUTO: 2.57 10E6/UL (ref 4.4–5.9)
RBC # BLD AUTO: 2.64 10E6/UL (ref 4.4–5.9)
RBC # BLD AUTO: 2.76 10E6/UL (ref 4.4–5.9)
RBC # BLD AUTO: 2.96 10E6/UL (ref 4.4–5.9)
RBC # BLD AUTO: 3.32 10E6/UL (ref 4.4–5.9)
RBC # BLD AUTO: 3.36 10E6/UL (ref 4.4–5.9)
RBC # BLD AUTO: 3.39 10E6/UL (ref 4.4–5.9)
RBC # BLD AUTO: 3.39 10E6/UL (ref 4.4–5.9)
RBC # BLD AUTO: 3.79 10E6/UL (ref 4.4–5.9)
RBC # BLD AUTO: 3.89 10E6/UL (ref 4.4–5.9)
SARS-COV-2 RNA RESP QL NAA+PROBE: NEGATIVE
SODIUM BLD-SCNC: 139 MMOL/L (ref 133–144)
SODIUM BLD-SCNC: 140 MMOL/L (ref 133–144)
SODIUM BLD-SCNC: 140 MMOL/L (ref 133–144)
SODIUM SERPL-SCNC: 137 MMOL/L (ref 133–144)
SODIUM SERPL-SCNC: 137 MMOL/L (ref 133–144)
SODIUM SERPL-SCNC: 137 MMOL/L (ref 136–145)
SODIUM SERPL-SCNC: 138 MMOL/L (ref 133–144)
SODIUM SERPL-SCNC: 139 MMOL/L (ref 133–144)
SODIUM SERPL-SCNC: 140 MMOL/L (ref 133–144)
SPECIMEN EXPIRATION DATE: NORMAL
SYSTOLIC BLOOD PRESSURE - MUSE: NORMAL MMHG
T AXIS - MUSE: 15 DEGREES
UNIT ABO/RH: NORMAL
UNIT NUMBER: NORMAL
UNIT STATUS: NORMAL
UNIT TYPE ISBT: 5100
VENTRICULAR RATE- MUSE: 65 BPM
WBC # BLD AUTO: 10.3 10E3/UL (ref 4–11)
WBC # BLD AUTO: 10.5 10E3/UL (ref 4–11)
WBC # BLD AUTO: 10.8 10E3/UL (ref 4–11)
WBC # BLD AUTO: 10.8 10E3/UL (ref 4–11)
WBC # BLD AUTO: 11.3 10E3/UL (ref 4–11)
WBC # BLD AUTO: 11.9 10E3/UL (ref 4–11)
WBC # BLD AUTO: 13 10E3/UL (ref 4–11)
WBC # BLD AUTO: 8.5 10E3/UL (ref 4–11)
WBC # BLD AUTO: 9.1 10E3/UL (ref 4–11)
WBC # BLD AUTO: 9.3 10E3/UL (ref 4–11)
WBC # BLD AUTO: 9.9 10E3/UL (ref 4–11)

## 2022-01-01 PROCEDURE — 85027 COMPLETE CBC AUTOMATED: CPT | Performed by: INTERNAL MEDICINE

## 2022-01-01 PROCEDURE — P9016 RBC LEUKOCYTES REDUCED: HCPCS | Performed by: STUDENT IN AN ORGANIZED HEALTH CARE EDUCATION/TRAINING PROGRAM

## 2022-01-01 PROCEDURE — 36415 COLL VENOUS BLD VENIPUNCTURE: CPT | Performed by: INTERNAL MEDICINE

## 2022-01-01 PROCEDURE — 86850 RBC ANTIBODY SCREEN: CPT | Performed by: CLINICAL NURSE SPECIALIST

## 2022-01-01 PROCEDURE — 73502 X-RAY EXAM HIP UNI 2-3 VIEWS: CPT | Mod: LT | Performed by: RADIOLOGY

## 2022-01-01 PROCEDURE — 250N000011 HC RX IP 250 OP 636: Performed by: STUDENT IN AN ORGANIZED HEALTH CARE EDUCATION/TRAINING PROGRAM

## 2022-01-01 PROCEDURE — 120N000002 HC R&B MED SURG/OB UMMC

## 2022-01-01 PROCEDURE — 82803 BLOOD GASES ANY COMBINATION: CPT | Performed by: INTERNAL MEDICINE

## 2022-01-01 PROCEDURE — 250N000012 HC RX MED GY IP 250 OP 636 PS 637: Performed by: INTERNAL MEDICINE

## 2022-01-01 PROCEDURE — 99232 SBSQ HOSP IP/OBS MODERATE 35: CPT | Performed by: INTERNAL MEDICINE

## 2022-01-01 PROCEDURE — 250N000013 HC RX MED GY IP 250 OP 250 PS 637: Performed by: INTERNAL MEDICINE

## 2022-01-01 PROCEDURE — 86923 COMPATIBILITY TEST ELECTRIC: CPT

## 2022-01-01 PROCEDURE — 99233 SBSQ HOSP IP/OBS HIGH 50: CPT | Performed by: INTERNAL MEDICINE

## 2022-01-01 PROCEDURE — 250N000013 HC RX MED GY IP 250 OP 250 PS 637: Performed by: STUDENT IN AN ORGANIZED HEALTH CARE EDUCATION/TRAINING PROGRAM

## 2022-01-01 PROCEDURE — 258N000003 HC RX IP 258 OP 636: Performed by: NURSE ANESTHETIST, CERTIFIED REGISTERED

## 2022-01-01 PROCEDURE — 82374 ASSAY BLOOD CARBON DIOXIDE: CPT | Performed by: ANESTHESIOLOGY

## 2022-01-01 PROCEDURE — 272N000001 HC OR GENERAL SUPPLY STERILE: Performed by: ORTHOPAEDIC SURGERY

## 2022-01-01 PROCEDURE — 86923 COMPATIBILITY TEST ELECTRIC: CPT | Performed by: PHYSICIAN ASSISTANT

## 2022-01-01 PROCEDURE — 258N000003 HC RX IP 258 OP 636: Performed by: ORTHOPAEDIC SURGERY

## 2022-01-01 PROCEDURE — U0005 INFEC AGEN DETEC AMPLI PROBE: HCPCS

## 2022-01-01 PROCEDURE — 86140 C-REACTIVE PROTEIN: CPT | Performed by: INTERNAL MEDICINE

## 2022-01-01 PROCEDURE — 99239 HOSP IP/OBS DSCHRG MGMT >30: CPT | Performed by: INTERNAL MEDICINE

## 2022-01-01 PROCEDURE — 36415 COLL VENOUS BLD VENIPUNCTURE: CPT | Performed by: ORTHOPAEDIC SURGERY

## 2022-01-01 PROCEDURE — U0005 INFEC AGEN DETEC AMPLI PROBE: HCPCS | Performed by: INTERNAL MEDICINE

## 2022-01-01 PROCEDURE — 87102 FUNGUS ISOLATION CULTURE: CPT | Performed by: ORTHOPAEDIC SURGERY

## 2022-01-01 PROCEDURE — 82565 ASSAY OF CREATININE: CPT | Performed by: ORTHOPAEDIC SURGERY

## 2022-01-01 PROCEDURE — 36416 COLLJ CAPILLARY BLOOD SPEC: CPT | Performed by: ORTHOPAEDIC SURGERY

## 2022-01-01 PROCEDURE — 250N000013 HC RX MED GY IP 250 OP 250 PS 637: Performed by: PHYSICIAN ASSISTANT

## 2022-01-01 PROCEDURE — 250N000009 HC RX 250: Performed by: ORTHOPAEDIC SURGERY

## 2022-01-01 PROCEDURE — 87077 CULTURE AEROBIC IDENTIFY: CPT | Performed by: ORTHOPAEDIC SURGERY

## 2022-01-01 PROCEDURE — 97110 THERAPEUTIC EXERCISES: CPT | Mod: GP | Performed by: PHYSICAL THERAPIST

## 2022-01-01 PROCEDURE — 250N000013 HC RX MED GY IP 250 OP 250 PS 637: Performed by: NURSE PRACTITIONER

## 2022-01-01 PROCEDURE — 85652 RBC SED RATE AUTOMATED: CPT | Performed by: STUDENT IN AN ORGANIZED HEALTH CARE EDUCATION/TRAINING PROGRAM

## 2022-01-01 PROCEDURE — 97110 THERAPEUTIC EXERCISES: CPT | Mod: GP | Performed by: REHABILITATION PRACTITIONER

## 2022-01-01 PROCEDURE — 0SPB0JZ REMOVAL OF SYNTHETIC SUBSTITUTE FROM LEFT HIP JOINT, OPEN APPROACH: ICD-10-PCS | Performed by: ORTHOPAEDIC SURGERY

## 2022-01-01 PROCEDURE — 97161 PT EVAL LOW COMPLEX 20 MIN: CPT | Mod: GP | Performed by: PHYSICAL THERAPIST

## 2022-01-01 PROCEDURE — 80053 COMPREHEN METABOLIC PANEL: CPT | Performed by: INTERNAL MEDICINE

## 2022-01-01 PROCEDURE — 250N000009 HC RX 250: Performed by: PHYSICIAN ASSISTANT

## 2022-01-01 PROCEDURE — 97530 THERAPEUTIC ACTIVITIES: CPT | Mod: GP

## 2022-01-01 PROCEDURE — 250N000011 HC RX IP 250 OP 636: Performed by: ORTHOPAEDIC SURGERY

## 2022-01-01 PROCEDURE — 86481 TB AG RESPONSE T-CELL SUSP: CPT | Performed by: PHYSICIAN ASSISTANT

## 2022-01-01 PROCEDURE — 0QS704Z REPOSITION LEFT UPPER FEMUR WITH INTERNAL FIXATION DEVICE, OPEN APPROACH: ICD-10-PCS | Performed by: ORTHOPAEDIC SURGERY

## 2022-01-01 PROCEDURE — 120N000009 HC R&B SNF

## 2022-01-01 PROCEDURE — 36415 COLL VENOUS BLD VENIPUNCTURE: CPT | Performed by: PATHOLOGY

## 2022-01-01 PROCEDURE — 36416 COLLJ CAPILLARY BLOOD SPEC: CPT | Performed by: INTERNAL MEDICINE

## 2022-01-01 PROCEDURE — 258N000003 HC RX IP 258 OP 636: Performed by: STUDENT IN AN ORGANIZED HEALTH CARE EDUCATION/TRAINING PROGRAM

## 2022-01-01 PROCEDURE — 97530 THERAPEUTIC ACTIVITIES: CPT | Mod: GP | Performed by: REHABILITATION PRACTITIONER

## 2022-01-01 PROCEDURE — 83880 ASSAY OF NATRIURETIC PEPTIDE: CPT | Performed by: ANESTHESIOLOGY

## 2022-01-01 PROCEDURE — 82310 ASSAY OF CALCIUM: CPT | Performed by: INTERNAL MEDICINE

## 2022-01-01 PROCEDURE — 97162 PT EVAL MOD COMPLEX 30 MIN: CPT | Mod: GP

## 2022-01-01 PROCEDURE — 36415 COLL VENOUS BLD VENIPUNCTURE: CPT | Performed by: PHYSICIAN ASSISTANT

## 2022-01-01 PROCEDURE — 97530 THERAPEUTIC ACTIVITIES: CPT | Mod: GP | Performed by: PHYSICAL THERAPIST

## 2022-01-01 PROCEDURE — 258N000003 HC RX IP 258 OP 636: Performed by: INTERNAL MEDICINE

## 2022-01-01 PROCEDURE — P9016 RBC LEUKOCYTES REDUCED: HCPCS | Performed by: PHYSICIAN ASSISTANT

## 2022-01-01 PROCEDURE — 86923 COMPATIBILITY TEST ELECTRIC: CPT | Performed by: STUDENT IN AN ORGANIZED HEALTH CARE EDUCATION/TRAINING PROGRAM

## 2022-01-01 PROCEDURE — 80048 BASIC METABOLIC PNL TOTAL CA: CPT | Performed by: PATHOLOGY

## 2022-01-01 PROCEDURE — 86901 BLOOD TYPING SEROLOGIC RH(D): CPT | Performed by: CLINICAL NURSE SPECIALIST

## 2022-01-01 PROCEDURE — 99223 1ST HOSP IP/OBS HIGH 75: CPT | Mod: GC | Performed by: INTERNAL MEDICINE

## 2022-01-01 PROCEDURE — P9016 RBC LEUKOCYTES REDUCED: HCPCS

## 2022-01-01 PROCEDURE — 80048 BASIC METABOLIC PNL TOTAL CA: CPT | Performed by: INTERNAL MEDICINE

## 2022-01-01 PROCEDURE — 710N000010 HC RECOVERY PHASE 1, LEVEL 2, PER MIN: Performed by: ORTHOPAEDIC SURGERY

## 2022-01-01 PROCEDURE — 250N000011 HC RX IP 250 OP 636: Performed by: INTERNAL MEDICINE

## 2022-01-01 PROCEDURE — 97535 SELF CARE MNGMENT TRAINING: CPT | Mod: GO | Performed by: OCCUPATIONAL THERAPIST

## 2022-01-01 PROCEDURE — 97110 THERAPEUTIC EXERCISES: CPT | Mod: GO

## 2022-01-01 PROCEDURE — 258N000001 HC RX 258: Performed by: ORTHOPAEDIC SURGERY

## 2022-01-01 PROCEDURE — 250N000011 HC RX IP 250 OP 636: Performed by: PHYSICIAN ASSISTANT

## 2022-01-01 PROCEDURE — 99307 SBSQ NF CARE SF MDM 10: CPT | Performed by: PHYSICIAN ASSISTANT

## 2022-01-01 PROCEDURE — 5A09357 ASSISTANCE WITH RESPIRATORY VENTILATION, LESS THAN 24 CONSECUTIVE HOURS, CONTINUOUS POSITIVE AIRWAY PRESSURE: ICD-10-PCS | Performed by: ORTHOPAEDIC SURGERY

## 2022-01-01 PROCEDURE — 87070 CULTURE OTHR SPECIMN AEROBIC: CPT | Performed by: ORTHOPAEDIC SURGERY

## 2022-01-01 PROCEDURE — 36415 COLL VENOUS BLD VENIPUNCTURE: CPT | Performed by: STUDENT IN AN ORGANIZED HEALTH CARE EDUCATION/TRAINING PROGRAM

## 2022-01-01 PROCEDURE — 85018 HEMOGLOBIN: CPT | Performed by: INTERNAL MEDICINE

## 2022-01-01 PROCEDURE — 93010 ELECTROCARDIOGRAM REPORT: CPT | Performed by: INTERNAL MEDICINE

## 2022-01-01 PROCEDURE — 87185 SC STD ENZYME DETCJ PER NZM: CPT | Performed by: ORTHOPAEDIC SURGERY

## 2022-01-01 PROCEDURE — 85018 HEMOGLOBIN: CPT | Performed by: PHYSICIAN ASSISTANT

## 2022-01-01 PROCEDURE — 250N000009 HC RX 250: Performed by: NURSE ANESTHETIST, CERTIFIED REGISTERED

## 2022-01-01 PROCEDURE — G0463 HOSPITAL OUTPT CLINIC VISIT: HCPCS

## 2022-01-01 PROCEDURE — 272N000002 HC OR SUPPLY OTHER OPNP: Performed by: ORTHOPAEDIC SURGERY

## 2022-01-01 PROCEDURE — 250N000009 HC RX 250: Performed by: STUDENT IN AN ORGANIZED HEALTH CARE EDUCATION/TRAINING PROGRAM

## 2022-01-01 PROCEDURE — 85018 HEMOGLOBIN: CPT | Performed by: STUDENT IN AN ORGANIZED HEALTH CARE EDUCATION/TRAINING PROGRAM

## 2022-01-01 PROCEDURE — 85018 HEMOGLOBIN: CPT | Performed by: ORTHOPAEDIC SURGERY

## 2022-01-01 PROCEDURE — 27091 REMOVAL OF HIP PROSTHESIS: CPT | Mod: 22 | Performed by: ORTHOPAEDIC SURGERY

## 2022-01-01 PROCEDURE — 258N000003 HC RX IP 258 OP 636

## 2022-01-01 PROCEDURE — 250N000011 HC RX IP 250 OP 636: Performed by: NURSE ANESTHETIST, CERTIFIED REGISTERED

## 2022-01-01 PROCEDURE — 0SHB08Z INSERTION OF SPACER INTO LEFT HIP JOINT, OPEN APPROACH: ICD-10-PCS | Performed by: ORTHOPAEDIC SURGERY

## 2022-01-01 PROCEDURE — 97535 SELF CARE MNGMENT TRAINING: CPT | Mod: GO

## 2022-01-01 PROCEDURE — U0003 INFECTIOUS AGENT DETECTION BY NUCLEIC ACID (DNA OR RNA); SEVERE ACUTE RESPIRATORY SYNDROME CORONAVIRUS 2 (SARS-COV-2) (CORONAVIRUS DISEASE [COVID-19]), AMPLIFIED PROBE TECHNIQUE, MAKING USE OF HIGH THROUGHPUT TECHNOLOGIES AS DESCRIBED BY CMS-2020-01-R: HCPCS

## 2022-01-01 PROCEDURE — 86140 C-REACTIVE PROTEIN: CPT | Performed by: PHYSICIAN ASSISTANT

## 2022-01-01 PROCEDURE — 99222 1ST HOSP IP/OBS MODERATE 55: CPT | Performed by: PHYSICIAN ASSISTANT

## 2022-01-01 PROCEDURE — 87205 SMEAR GRAM STAIN: CPT | Performed by: ORTHOPAEDIC SURGERY

## 2022-01-01 PROCEDURE — C1713 ANCHOR/SCREW BN/BN,TIS/BN: HCPCS | Performed by: ORTHOPAEDIC SURGERY

## 2022-01-01 PROCEDURE — C1776 JOINT DEVICE (IMPLANTABLE): HCPCS | Performed by: ORTHOPAEDIC SURGERY

## 2022-01-01 PROCEDURE — G0463 HOSPITAL OUTPT CLINIC VISIT: HCPCS | Mod: 25

## 2022-01-01 PROCEDURE — 87075 CULTR BACTERIA EXCEPT BLOOD: CPT | Performed by: ORTHOPAEDIC SURGERY

## 2022-01-01 PROCEDURE — 360N000077 HC SURGERY LEVEL 4, PER MIN: Performed by: ORTHOPAEDIC SURGERY

## 2022-01-01 PROCEDURE — 97166 OT EVAL MOD COMPLEX 45 MIN: CPT | Mod: GO

## 2022-01-01 PROCEDURE — 84295 ASSAY OF SERUM SODIUM: CPT | Performed by: INTERNAL MEDICINE

## 2022-01-01 PROCEDURE — 370N000017 HC ANESTHESIA TECHNICAL FEE, PER MIN: Performed by: ORTHOPAEDIC SURGERY

## 2022-01-01 PROCEDURE — 85014 HEMATOCRIT: CPT | Performed by: INTERNAL MEDICINE

## 2022-01-01 PROCEDURE — 999N000065 XR PELVIS PORT 1/2 VIEWS

## 2022-01-01 PROCEDURE — 99205 OFFICE O/P NEW HI 60 MIN: CPT | Performed by: CLINICAL NURSE SPECIALIST

## 2022-01-01 PROCEDURE — 999N000141 HC STATISTIC PRE-PROCEDURE NURSING ASSESSMENT: Performed by: ORTHOPAEDIC SURGERY

## 2022-01-01 PROCEDURE — 999N000015 HC STATISTIC ARTERIAL MONITORING DAILY

## 2022-01-01 PROCEDURE — 99214 OFFICE O/P EST MOD 30 MIN: CPT | Mod: GC | Performed by: STUDENT IN AN ORGANIZED HEALTH CARE EDUCATION/TRAINING PROGRAM

## 2022-01-01 PROCEDURE — 82330 ASSAY OF CALCIUM: CPT

## 2022-01-01 PROCEDURE — 250N000025 HC SEVOFLURANE, PER MIN: Performed by: ORTHOPAEDIC SURGERY

## 2022-01-01 PROCEDURE — 97110 THERAPEUTIC EXERCISES: CPT | Mod: GO | Performed by: OCCUPATIONAL THERAPIST

## 2022-01-01 PROCEDURE — G0463 HOSPITAL OUTPT CLINIC VISIT: HCPCS | Performed by: STUDENT IN AN ORGANIZED HEALTH CARE EDUCATION/TRAINING PROGRAM

## 2022-01-01 PROCEDURE — 250N000009 HC RX 250: Performed by: ANESTHESIOLOGY

## 2022-01-01 PROCEDURE — 99214 OFFICE O/P EST MOD 30 MIN: CPT | Performed by: ORTHOPAEDIC SURGERY

## 2022-01-01 PROCEDURE — 27165 INCISION/FIXATION OF FEMUR: CPT | Mod: 22 | Performed by: ORTHOPAEDIC SURGERY

## 2022-01-01 PROCEDURE — 999N000157 HC STATISTIC RCP TIME EA 10 MIN

## 2022-01-01 PROCEDURE — 99305 1ST NF CARE MODERATE MDM 35: CPT | Performed by: INTERNAL MEDICINE

## 2022-01-01 PROCEDURE — 85027 COMPLETE CBC AUTOMATED: CPT | Performed by: PATHOLOGY

## 2022-01-01 PROCEDURE — 71045 X-RAY EXAM CHEST 1 VIEW: CPT | Mod: 26 | Performed by: RADIOLOGY

## 2022-01-01 PROCEDURE — 86140 C-REACTIVE PROTEIN: CPT | Performed by: STUDENT IN AN ORGANIZED HEALTH CARE EDUCATION/TRAINING PROGRAM

## 2022-01-01 PROCEDURE — 97602 WOUND(S) CARE NON-SELECTIVE: CPT

## 2022-01-01 PROCEDURE — 82330 ASSAY OF CALCIUM: CPT | Performed by: INTERNAL MEDICINE

## 2022-01-01 PROCEDURE — P9045 ALBUMIN (HUMAN), 5%, 250 ML: HCPCS | Performed by: NURSE ANESTHETIST, CERTIFIED REGISTERED

## 2022-01-01 PROCEDURE — 93005 ELECTROCARDIOGRAM TRACING: CPT

## 2022-01-01 PROCEDURE — 85027 COMPLETE CBC AUTOMATED: CPT | Performed by: PHYSICIAN ASSISTANT

## 2022-01-01 PROCEDURE — 84132 ASSAY OF SERUM POTASSIUM: CPT

## 2022-01-01 PROCEDURE — 85004 AUTOMATED DIFF WBC COUNT: CPT | Performed by: INTERNAL MEDICINE

## 2022-01-01 PROCEDURE — 71045 X-RAY EXAM CHEST 1 VIEW: CPT

## 2022-01-01 PROCEDURE — 99233 SBSQ HOSP IP/OBS HIGH 50: CPT | Performed by: STUDENT IN AN ORGANIZED HEALTH CARE EDUCATION/TRAINING PROGRAM

## 2022-01-01 PROCEDURE — 86850 RBC ANTIBODY SCREEN: CPT | Performed by: ORTHOPAEDIC SURGERY

## 2022-01-01 PROCEDURE — 83036 HEMOGLOBIN GLYCOSYLATED A1C: CPT | Performed by: INTERNAL MEDICINE

## 2022-01-01 DEVICE — DISPOSABLE CUSTOM MODULAR SPACER MOLD
Type: IMPLANTABLE DEVICE | Site: HIP | Status: NON-FUNCTIONAL
Brand: SPACEFLEX HIP
Removed: 2023-05-26

## 2022-01-01 DEVICE — IMPLANTABLE DEVICE
Type: IMPLANTABLE DEVICE | Site: HIP | Status: NON-FUNCTIONAL
Brand: G3A 40 BONE CEMENT
Removed: 2023-05-26

## 2022-01-01 DEVICE — BEADED CABLE AND SLEEVE SET
Type: IMPLANTABLE DEVICE | Site: HIP | Status: FUNCTIONAL
Brand: DALL-MILES

## 2022-01-01 RX ORDER — NALOXONE HYDROCHLORIDE 0.4 MG/ML
0.4 INJECTION, SOLUTION INTRAMUSCULAR; INTRAVENOUS; SUBCUTANEOUS
Status: DISCONTINUED | OUTPATIENT
Start: 2022-01-01 | End: 2022-01-01 | Stop reason: HOSPADM

## 2022-01-01 RX ORDER — METHOCARBAMOL 500 MG/1
500 TABLET, FILM COATED ORAL EVERY 6 HOURS PRN
Status: DISCONTINUED | OUTPATIENT
Start: 2022-01-01 | End: 2022-01-01

## 2022-01-01 RX ORDER — NALOXONE HYDROCHLORIDE 0.4 MG/ML
0.2 INJECTION, SOLUTION INTRAMUSCULAR; INTRAVENOUS; SUBCUTANEOUS
Status: DISCONTINUED | OUTPATIENT
Start: 2022-01-01 | End: 2022-01-01 | Stop reason: HOSPADM

## 2022-01-01 RX ORDER — ACETAMINOPHEN 325 MG/1
650 TABLET ORAL EVERY 4 HOURS PRN
Status: DISCONTINUED | OUTPATIENT
Start: 2022-01-01 | End: 2022-01-01 | Stop reason: HOSPADM

## 2022-01-01 RX ORDER — CALCIUM CARBONATE 500 MG/1
500 TABLET, CHEWABLE ORAL 3 TIMES DAILY PRN
Status: DISCONTINUED | OUTPATIENT
Start: 2022-01-01 | End: 2023-01-01 | Stop reason: HOSPADM

## 2022-01-01 RX ORDER — MAGNESIUM HYDROXIDE 1200 MG/15ML
LIQUID ORAL PRN
Status: DISCONTINUED | OUTPATIENT
Start: 2022-01-01 | End: 2022-01-01 | Stop reason: HOSPADM

## 2022-01-01 RX ORDER — MEPERIDINE HYDROCHLORIDE 25 MG/ML
12.5 INJECTION INTRAMUSCULAR; INTRAVENOUS; SUBCUTANEOUS
Status: DISCONTINUED | OUTPATIENT
Start: 2022-01-01 | End: 2022-01-01 | Stop reason: HOSPADM

## 2022-01-01 RX ORDER — HYDROXYZINE HYDROCHLORIDE 10 MG/1
10 TABLET, FILM COATED ORAL EVERY 6 HOURS PRN
Status: DISCONTINUED | OUTPATIENT
Start: 2022-01-01 | End: 2022-01-01 | Stop reason: HOSPADM

## 2022-01-01 RX ORDER — SODIUM CHLORIDE, SODIUM LACTATE, POTASSIUM CHLORIDE, CALCIUM CHLORIDE 600; 310; 30; 20 MG/100ML; MG/100ML; MG/100ML; MG/100ML
INJECTION, SOLUTION INTRAVENOUS CONTINUOUS
Status: DISCONTINUED | OUTPATIENT
Start: 2022-01-01 | End: 2022-01-01 | Stop reason: HOSPADM

## 2022-01-01 RX ORDER — LIDOCAINE 40 MG/G
CREAM TOPICAL
Status: DISCONTINUED | OUTPATIENT
Start: 2022-01-01 | End: 2022-01-01 | Stop reason: HOSPADM

## 2022-01-01 RX ORDER — HYDROMORPHONE HYDROCHLORIDE 1 MG/ML
0.2 INJECTION, SOLUTION INTRAMUSCULAR; INTRAVENOUS; SUBCUTANEOUS EVERY 5 MIN PRN
Status: DISCONTINUED | OUTPATIENT
Start: 2022-01-01 | End: 2022-01-01 | Stop reason: HOSPADM

## 2022-01-01 RX ORDER — FENTANYL CITRATE 50 UG/ML
50 INJECTION, SOLUTION INTRAMUSCULAR; INTRAVENOUS EVERY 5 MIN PRN
Status: DISCONTINUED | OUTPATIENT
Start: 2022-01-01 | End: 2022-01-01 | Stop reason: HOSPADM

## 2022-01-01 RX ORDER — HYDROMORPHONE HCL IN WATER/PF 6 MG/30 ML
0.2 PATIENT CONTROLLED ANALGESIA SYRINGE INTRAVENOUS
Status: DISCONTINUED | OUTPATIENT
Start: 2022-01-01 | End: 2022-01-01

## 2022-01-01 RX ORDER — ONDANSETRON 2 MG/ML
4 INJECTION INTRAMUSCULAR; INTRAVENOUS EVERY 6 HOURS PRN
Status: DISCONTINUED | OUTPATIENT
Start: 2022-01-01 | End: 2022-01-01 | Stop reason: HOSPADM

## 2022-01-01 RX ORDER — CIPROFLOXACIN 500 MG/1
500 TABLET, FILM COATED ORAL EVERY 12 HOURS
Status: COMPLETED | OUTPATIENT
Start: 2022-01-01 | End: 2023-01-01

## 2022-01-01 RX ORDER — SODIUM CHLORIDE, SODIUM LACTATE, POTASSIUM CHLORIDE, CALCIUM CHLORIDE 600; 310; 30; 20 MG/100ML; MG/100ML; MG/100ML; MG/100ML
INJECTION, SOLUTION INTRAVENOUS CONTINUOUS PRN
Status: DISCONTINUED | OUTPATIENT
Start: 2022-01-01 | End: 2022-01-01

## 2022-01-01 RX ORDER — METRONIDAZOLE 500 MG/1
500 TABLET ORAL 3 TIMES DAILY
Status: DISCONTINUED | OUTPATIENT
Start: 2022-01-01 | End: 2022-01-01

## 2022-01-01 RX ORDER — TRANEXAMIC ACID 650 MG/1
1950 TABLET ORAL ONCE
Status: CANCELLED | OUTPATIENT
Start: 2022-01-01 | End: 2022-01-01

## 2022-01-01 RX ORDER — SODIUM CHLORIDE 9 MG/ML
INJECTION, SOLUTION INTRAVENOUS
Status: COMPLETED
Start: 2022-01-01 | End: 2022-01-01

## 2022-01-01 RX ORDER — ACETAMINOPHEN 325 MG/1
975 TABLET ORAL ONCE
Status: CANCELLED | OUTPATIENT
Start: 2022-01-01 | End: 2022-01-01

## 2022-01-01 RX ORDER — POLYETHYLENE GLYCOL 3350 17 G/17G
17 POWDER, FOR SOLUTION ORAL DAILY
Status: ON HOLD | DISCHARGE
Start: 2022-01-01 | End: 2023-01-01

## 2022-01-01 RX ORDER — LACTOBACILLUS RHAMNOSUS GG 10B CELL
1 CAPSULE ORAL 2 TIMES DAILY
Status: ON HOLD | DISCHARGE
Start: 2022-01-01 | End: 2023-01-01

## 2022-01-01 RX ORDER — ONDANSETRON 4 MG/1
4 TABLET, ORALLY DISINTEGRATING ORAL EVERY 30 MIN PRN
Status: DISCONTINUED | OUTPATIENT
Start: 2022-01-01 | End: 2022-01-01 | Stop reason: HOSPADM

## 2022-01-01 RX ORDER — METHOCARBAMOL 500 MG/1
500 TABLET, FILM COATED ORAL EVERY 6 HOURS PRN
Status: DISCONTINUED | OUTPATIENT
Start: 2022-01-01 | End: 2022-01-01 | Stop reason: HOSPADM

## 2022-01-01 RX ORDER — VANCOMYCIN HYDROCHLORIDE 1 G/20ML
INJECTION, POWDER, LYOPHILIZED, FOR SOLUTION INTRAVENOUS PRN
Status: DISCONTINUED | OUTPATIENT
Start: 2022-01-01 | End: 2022-01-01 | Stop reason: HOSPADM

## 2022-01-01 RX ORDER — OXYCODONE HYDROCHLORIDE 5 MG/1
5 TABLET ORAL EVERY 4 HOURS PRN
Status: DISCONTINUED | OUTPATIENT
Start: 2022-01-01 | End: 2023-01-01

## 2022-01-01 RX ORDER — ALBUTEROL SULFATE 0.83 MG/ML
SOLUTION RESPIRATORY (INHALATION) PRN
Status: DISCONTINUED | OUTPATIENT
Start: 2022-01-01 | End: 2022-01-01

## 2022-01-01 RX ORDER — TRANEXAMIC ACID 650 MG/1
1950 TABLET ORAL ONCE
Status: COMPLETED | OUTPATIENT
Start: 2022-01-01 | End: 2022-01-01

## 2022-01-01 RX ORDER — CEFAZOLIN SODIUM/WATER 3 G/30 ML
3 SYRINGE (ML) INTRAVENOUS SEE ADMIN INSTRUCTIONS
Status: DISCONTINUED | OUTPATIENT
Start: 2022-01-01 | End: 2022-01-01 | Stop reason: HOSPADM

## 2022-01-01 RX ORDER — METRONIDAZOLE 500 MG/1
500 TABLET ORAL 3 TIMES DAILY
Status: COMPLETED | OUTPATIENT
Start: 2022-01-01 | End: 2023-01-01

## 2022-01-01 RX ORDER — BISACODYL 10 MG
10 SUPPOSITORY, RECTAL RECTAL DAILY PRN
Status: DISCONTINUED | OUTPATIENT
Start: 2022-01-01 | End: 2022-01-01 | Stop reason: HOSPADM

## 2022-01-01 RX ORDER — LACTULOSE 10 G/15ML
20 SOLUTION ORAL 2 TIMES DAILY
Status: DISPENSED | OUTPATIENT
Start: 2022-01-01 | End: 2022-01-01

## 2022-01-01 RX ORDER — LIRAGLUTIDE 6 MG/ML
1.8 INJECTION SUBCUTANEOUS DAILY
Status: DISCONTINUED | OUTPATIENT
Start: 2022-01-01 | End: 2023-01-01

## 2022-01-01 RX ORDER — ACETAMINOPHEN 500 MG
1000 TABLET ORAL EVERY 8 HOURS PRN
Status: DISCONTINUED | OUTPATIENT
Start: 2022-01-01 | End: 2023-01-01 | Stop reason: HOSPADM

## 2022-01-01 RX ORDER — ACETAMINOPHEN 325 MG/1
975 TABLET ORAL ONCE
Status: COMPLETED | OUTPATIENT
Start: 2022-01-01 | End: 2022-01-01

## 2022-01-01 RX ORDER — ACETAMINOPHEN 325 MG/1
975 TABLET ORAL EVERY 8 HOURS
Status: DISPENSED | OUTPATIENT
Start: 2022-01-01 | End: 2022-01-01

## 2022-01-01 RX ORDER — PROPOFOL 10 MG/ML
INJECTION, EMULSION INTRAVENOUS PRN
Status: DISCONTINUED | OUTPATIENT
Start: 2022-01-01 | End: 2022-01-01

## 2022-01-01 RX ORDER — NICOTINE POLACRILEX 4 MG
15-30 LOZENGE BUCCAL
Status: DISCONTINUED | OUTPATIENT
Start: 2022-01-01 | End: 2023-01-01 | Stop reason: HOSPADM

## 2022-01-01 RX ORDER — CIPROFLOXACIN 500 MG/1
500 TABLET, FILM COATED ORAL EVERY 12 HOURS
Status: DISCONTINUED | OUTPATIENT
Start: 2022-01-01 | End: 2022-01-01

## 2022-01-01 RX ORDER — HYDROMORPHONE HYDROCHLORIDE 1 MG/ML
0.4 INJECTION, SOLUTION INTRAMUSCULAR; INTRAVENOUS; SUBCUTANEOUS EVERY 5 MIN PRN
Status: DISCONTINUED | OUTPATIENT
Start: 2022-01-01 | End: 2022-01-01 | Stop reason: HOSPADM

## 2022-01-01 RX ORDER — METFORMIN HCL 500 MG
2000 TABLET, EXTENDED RELEASE 24 HR ORAL EVERY MORNING
Status: DISCONTINUED | OUTPATIENT
Start: 2022-01-01 | End: 2023-01-01

## 2022-01-01 RX ORDER — POLYETHYLENE GLYCOL 3350 17 G/17G
17 POWDER, FOR SOLUTION ORAL DAILY
Status: DISCONTINUED | OUTPATIENT
Start: 2022-01-01 | End: 2022-01-01 | Stop reason: HOSPADM

## 2022-01-01 RX ORDER — ACETAMINOPHEN 325 MG/1
975 TABLET ORAL ONCE
Status: DISCONTINUED | OUTPATIENT
Start: 2022-01-01 | End: 2022-01-01

## 2022-01-01 RX ORDER — ONDANSETRON 4 MG/1
4 TABLET, ORALLY DISINTEGRATING ORAL EVERY 6 HOURS PRN
Status: DISCONTINUED | OUTPATIENT
Start: 2022-01-01 | End: 2022-01-01 | Stop reason: HOSPADM

## 2022-01-01 RX ORDER — ONDANSETRON 2 MG/ML
INJECTION INTRAMUSCULAR; INTRAVENOUS PRN
Status: DISCONTINUED | OUTPATIENT
Start: 2022-01-01 | End: 2022-01-01

## 2022-01-01 RX ORDER — ACETAMINOPHEN 325 MG/1
650 TABLET ORAL EVERY 4 HOURS PRN
Status: ON HOLD | DISCHARGE
Start: 2022-01-01 | End: 2023-01-01

## 2022-01-01 RX ORDER — SODIUM CHLORIDE 9 MG/ML
INJECTION, SOLUTION INTRAVENOUS
Status: DISCONTINUED
Start: 2022-01-01 | End: 2022-01-01 | Stop reason: HOSPADM

## 2022-01-01 RX ORDER — DEXTROSE MONOHYDRATE 100 MG/ML
INJECTION, SOLUTION INTRAVENOUS CONTINUOUS PRN
Status: DISCONTINUED | OUTPATIENT
Start: 2022-01-01 | End: 2022-01-01

## 2022-01-01 RX ORDER — METHOCARBAMOL 500 MG/1
500 TABLET, FILM COATED ORAL EVERY 6 HOURS PRN
Status: ON HOLD | DISCHARGE
Start: 2022-01-01 | End: 2023-01-01

## 2022-01-01 RX ORDER — DEXTROSE MONOHYDRATE 25 G/50ML
25-50 INJECTION, SOLUTION INTRAVENOUS
Status: DISCONTINUED | OUTPATIENT
Start: 2022-01-01 | End: 2022-01-01 | Stop reason: HOSPADM

## 2022-01-01 RX ORDER — FUROSEMIDE 20 MG
20 TABLET ORAL
Status: DISCONTINUED | OUTPATIENT
Start: 2022-01-01 | End: 2022-01-01

## 2022-01-01 RX ORDER — OXYCODONE HYDROCHLORIDE 5 MG/1
5 TABLET ORAL EVERY 4 HOURS PRN
Status: DISCONTINUED | OUTPATIENT
Start: 2022-01-01 | End: 2022-01-01 | Stop reason: HOSPADM

## 2022-01-01 RX ORDER — NALOXONE HYDROCHLORIDE 0.4 MG/ML
0.4 INJECTION, SOLUTION INTRAMUSCULAR; INTRAVENOUS; SUBCUTANEOUS
Status: DISCONTINUED | OUTPATIENT
Start: 2022-01-01 | End: 2023-01-01 | Stop reason: HOSPADM

## 2022-01-01 RX ORDER — LIDOCAINE HYDROCHLORIDE 20 MG/ML
INJECTION, SOLUTION INFILTRATION; PERINEURAL PRN
Status: DISCONTINUED | OUTPATIENT
Start: 2022-01-01 | End: 2022-01-01

## 2022-01-01 RX ORDER — PIOGLITAZONEHYDROCHLORIDE 45 MG/1
45 TABLET ORAL DAILY
Status: DISCONTINUED | OUTPATIENT
Start: 2022-01-01 | End: 2022-01-01 | Stop reason: HOSPADM

## 2022-01-01 RX ORDER — FUROSEMIDE 20 MG
20 TABLET ORAL DAILY
Status: DISCONTINUED | OUTPATIENT
Start: 2022-01-01 | End: 2022-01-01 | Stop reason: HOSPADM

## 2022-01-01 RX ORDER — PIOGLITAZONEHYDROCHLORIDE 45 MG/1
45 TABLET ORAL DAILY
Status: DISCONTINUED | OUTPATIENT
Start: 2022-01-01 | End: 2023-01-01

## 2022-01-01 RX ORDER — SODIUM CHLORIDE, SODIUM LACTATE, POTASSIUM CHLORIDE, CALCIUM CHLORIDE 600; 310; 30; 20 MG/100ML; MG/100ML; MG/100ML; MG/100ML
INJECTION, SOLUTION INTRAVENOUS CONTINUOUS
Status: DISCONTINUED | OUTPATIENT
Start: 2022-01-01 | End: 2022-01-01

## 2022-01-01 RX ORDER — NALOXONE HYDROCHLORIDE 0.4 MG/ML
0.2 INJECTION, SOLUTION INTRAMUSCULAR; INTRAVENOUS; SUBCUTANEOUS
Status: DISCONTINUED | OUTPATIENT
Start: 2022-01-01 | End: 2023-01-01 | Stop reason: HOSPADM

## 2022-01-01 RX ORDER — PROCHLORPERAZINE MALEATE 5 MG
5 TABLET ORAL EVERY 6 HOURS PRN
Status: DISCONTINUED | OUTPATIENT
Start: 2022-01-01 | End: 2022-01-01 | Stop reason: HOSPADM

## 2022-01-01 RX ORDER — FENTANYL CITRATE 50 UG/ML
25 INJECTION, SOLUTION INTRAMUSCULAR; INTRAVENOUS
Status: DISCONTINUED | OUTPATIENT
Start: 2022-01-01 | End: 2022-01-01 | Stop reason: HOSPADM

## 2022-01-01 RX ORDER — HYDROMORPHONE HCL IN WATER/PF 6 MG/30 ML
0.4 PATIENT CONTROLLED ANALGESIA SYRINGE INTRAVENOUS
Status: DISCONTINUED | OUTPATIENT
Start: 2022-01-01 | End: 2022-01-01

## 2022-01-01 RX ORDER — LACTOBACILLUS RHAMNOSUS GG 10B CELL
1 CAPSULE ORAL 2 TIMES DAILY
Status: DISCONTINUED | OUTPATIENT
Start: 2022-01-01 | End: 2022-01-01 | Stop reason: HOSPADM

## 2022-01-01 RX ORDER — FENTANYL CITRATE 50 UG/ML
25 INJECTION, SOLUTION INTRAMUSCULAR; INTRAVENOUS EVERY 5 MIN PRN
Status: DISCONTINUED | OUTPATIENT
Start: 2022-01-01 | End: 2022-01-01 | Stop reason: HOSPADM

## 2022-01-01 RX ORDER — AMOXICILLIN 250 MG
1 CAPSULE ORAL 2 TIMES DAILY
Status: DISCONTINUED | OUTPATIENT
Start: 2022-01-01 | End: 2022-01-01

## 2022-01-01 RX ORDER — NICOTINE POLACRILEX 4 MG
15-30 LOZENGE BUCCAL
Status: DISCONTINUED | OUTPATIENT
Start: 2022-01-01 | End: 2022-01-01

## 2022-01-01 RX ORDER — OXYCODONE HYDROCHLORIDE 10 MG/1
10 TABLET ORAL EVERY 4 HOURS PRN
Status: DISCONTINUED | OUTPATIENT
Start: 2022-01-01 | End: 2022-01-01 | Stop reason: HOSPADM

## 2022-01-01 RX ORDER — DEXTROSE MONOHYDRATE 25 G/50ML
25-50 INJECTION, SOLUTION INTRAVENOUS
Status: DISCONTINUED | OUTPATIENT
Start: 2022-01-01 | End: 2023-01-01 | Stop reason: HOSPADM

## 2022-01-01 RX ORDER — SALIVA STIMULANT COMB. NO.3
1 SPRAY, NON-AEROSOL (ML) MUCOUS MEMBRANE 4 TIMES DAILY PRN
Status: DISCONTINUED | OUTPATIENT
Start: 2022-01-01 | End: 2022-01-01

## 2022-01-01 RX ORDER — CEFAZOLIN SODIUM IN 0.9 % NACL 3 G/100 ML
3 INTRAVENOUS SOLUTION, PIGGYBACK (ML) INTRAVENOUS
Status: CANCELLED | OUTPATIENT
Start: 2022-01-01

## 2022-01-01 RX ORDER — AMOXICILLIN 250 MG
2 CAPSULE ORAL 2 TIMES DAILY
Status: DISCONTINUED | OUTPATIENT
Start: 2022-01-01 | End: 2022-01-01

## 2022-01-01 RX ORDER — ACETAMINOPHEN 325 MG/1
650 TABLET ORAL EVERY 4 HOURS PRN
Status: DISCONTINUED | OUTPATIENT
Start: 2022-01-01 | End: 2022-01-01

## 2022-01-01 RX ORDER — SODIUM CHLORIDE 9 MG/ML
INJECTION, SOLUTION INTRAVENOUS CONTINUOUS PRN
Status: DISCONTINUED | OUTPATIENT
Start: 2022-01-01 | End: 2022-01-01

## 2022-01-01 RX ORDER — AMOXICILLIN 250 MG
2 CAPSULE ORAL 2 TIMES DAILY
Status: DISCONTINUED | OUTPATIENT
Start: 2022-01-01 | End: 2022-01-01 | Stop reason: HOSPADM

## 2022-01-01 RX ORDER — POLYETHYLENE GLYCOL 3350 17 G/17G
17 POWDER, FOR SOLUTION ORAL DAILY PRN
Status: DISCONTINUED | OUTPATIENT
Start: 2022-01-01 | End: 2023-01-01 | Stop reason: HOSPADM

## 2022-01-01 RX ORDER — METOPROLOL TARTRATE 1 MG/ML
1-2 INJECTION, SOLUTION INTRAVENOUS EVERY 5 MIN PRN
Status: DISCONTINUED | OUTPATIENT
Start: 2022-01-01 | End: 2022-01-01 | Stop reason: HOSPADM

## 2022-01-01 RX ORDER — HYDROXYZINE HYDROCHLORIDE 10 MG/1
10 TABLET, FILM COATED ORAL EVERY 6 HOURS PRN
DISCHARGE
Start: 2022-01-01 | End: 2022-01-01

## 2022-01-01 RX ORDER — SODIUM CHLORIDE 9 MG/ML
INJECTION, SOLUTION INTRAVENOUS CONTINUOUS
Status: ACTIVE | OUTPATIENT
Start: 2022-01-01 | End: 2022-01-01

## 2022-01-01 RX ORDER — CIPROFLOXACIN 500 MG/1
500 TABLET, FILM COATED ORAL EVERY 12 HOURS
Status: ON HOLD | DISCHARGE
Start: 2022-01-01 | End: 2023-01-01

## 2022-01-01 RX ORDER — AMOXICILLIN 250 MG
2 CAPSULE ORAL 2 TIMES DAILY PRN
Status: DISCONTINUED | OUTPATIENT
Start: 2022-01-01 | End: 2023-01-01 | Stop reason: HOSPADM

## 2022-01-01 RX ORDER — METRONIDAZOLE 500 MG/1
500 TABLET ORAL 3 TIMES DAILY
Status: ON HOLD | DISCHARGE
Start: 2022-01-01 | End: 2023-01-01

## 2022-01-01 RX ORDER — SODIUM CHLORIDE 9 MG/ML
INJECTION, SOLUTION INTRAVENOUS CONTINUOUS
Status: DISCONTINUED | OUTPATIENT
Start: 2022-01-01 | End: 2022-01-01

## 2022-01-01 RX ORDER — CEFAZOLIN SODIUM/WATER 3 G/30 ML
3 SYRINGE (ML) INTRAVENOUS
Status: COMPLETED | OUTPATIENT
Start: 2022-01-01 | End: 2022-01-01

## 2022-01-01 RX ORDER — CIPROFLOXACIN 250 MG/1
500 TABLET, FILM COATED ORAL EVERY 12 HOURS SCHEDULED
Status: DISCONTINUED | OUTPATIENT
Start: 2022-01-01 | End: 2022-01-01 | Stop reason: HOSPADM

## 2022-01-01 RX ORDER — ASPIRIN 81 MG/1
162 TABLET ORAL DAILY
Status: DISCONTINUED | OUTPATIENT
Start: 2022-01-01 | End: 2022-01-01

## 2022-01-01 RX ORDER — LACTOBACILLUS RHAMNOSUS GG 10B CELL
1 CAPSULE ORAL 2 TIMES DAILY
Status: DISCONTINUED | OUTPATIENT
Start: 2022-01-01 | End: 2023-01-01

## 2022-01-01 RX ORDER — PIPERACILLIN SODIUM, TAZOBACTAM SODIUM 3; .375 G/15ML; G/15ML
3.38 INJECTION, POWDER, LYOPHILIZED, FOR SOLUTION INTRAVENOUS EVERY 6 HOURS
Status: DISCONTINUED | OUTPATIENT
Start: 2022-01-01 | End: 2022-01-01

## 2022-01-01 RX ORDER — CELECOXIB 100 MG/1
400 CAPSULE ORAL ONCE
Status: CANCELLED | OUTPATIENT
Start: 2022-01-01 | End: 2022-01-01

## 2022-01-01 RX ORDER — DEXTROSE MONOHYDRATE 25 G/50ML
25-50 INJECTION, SOLUTION INTRAVENOUS
Status: DISCONTINUED | OUTPATIENT
Start: 2022-01-01 | End: 2022-01-01

## 2022-01-01 RX ORDER — FENTANYL CITRATE 50 UG/ML
INJECTION, SOLUTION INTRAMUSCULAR; INTRAVENOUS PRN
Status: DISCONTINUED | OUTPATIENT
Start: 2022-01-01 | End: 2022-01-01

## 2022-01-01 RX ORDER — METRONIDAZOLE 500 MG/1
500 TABLET ORAL 3 TIMES DAILY
Status: DISCONTINUED | OUTPATIENT
Start: 2022-01-01 | End: 2022-01-01 | Stop reason: HOSPADM

## 2022-01-01 RX ORDER — POLYETHYLENE GLYCOL 3350 17 G/17G
17 POWDER, FOR SOLUTION ORAL DAILY
Status: DISCONTINUED | OUTPATIENT
Start: 2022-01-01 | End: 2022-01-01

## 2022-01-01 RX ORDER — NICOTINE POLACRILEX 4 MG
15-30 LOZENGE BUCCAL
Status: DISCONTINUED | OUTPATIENT
Start: 2022-01-01 | End: 2022-01-01 | Stop reason: HOSPADM

## 2022-01-01 RX ORDER — FUROSEMIDE 20 MG
20 TABLET ORAL DAILY
Status: DISCONTINUED | OUTPATIENT
Start: 2022-01-01 | End: 2022-01-01

## 2022-01-01 RX ORDER — CEFAZOLIN SODIUM IN 0.9 % NACL 3 G/100 ML
3 INTRAVENOUS SOLUTION, PIGGYBACK (ML) INTRAVENOUS SEE ADMIN INSTRUCTIONS
Status: CANCELLED | OUTPATIENT
Start: 2022-01-01

## 2022-01-01 RX ORDER — SODIUM CHLORIDE, SODIUM GLUCONATE, SODIUM ACETATE, POTASSIUM CHLORIDE AND MAGNESIUM CHLORIDE 526; 502; 368; 37; 30 MG/100ML; MG/100ML; MG/100ML; MG/100ML; MG/100ML
INJECTION, SOLUTION INTRAVENOUS CONTINUOUS PRN
Status: DISCONTINUED | OUTPATIENT
Start: 2022-01-01 | End: 2022-01-01

## 2022-01-01 RX ORDER — AMOXICILLIN 250 MG
2 CAPSULE ORAL 2 TIMES DAILY
Status: ON HOLD | DISCHARGE
Start: 2022-01-01 | End: 2023-01-01

## 2022-01-01 RX ORDER — SALIVA STIMULANT COMB. NO.3
1 SPRAY, NON-AEROSOL (ML) MUCOUS MEMBRANE 4 TIMES DAILY PRN
Status: DISCONTINUED | OUTPATIENT
Start: 2022-01-01 | End: 2022-01-01 | Stop reason: HOSPADM

## 2022-01-01 RX ORDER — FUROSEMIDE 20 MG
20 TABLET ORAL DAILY
Status: DISCONTINUED | OUTPATIENT
Start: 2022-01-01 | End: 2023-01-01

## 2022-01-01 RX ORDER — ONDANSETRON 4 MG/1
4 TABLET, ORALLY DISINTEGRATING ORAL EVERY 6 HOURS PRN
Status: DISCONTINUED | OUTPATIENT
Start: 2022-01-01 | End: 2023-01-01 | Stop reason: HOSPADM

## 2022-01-01 RX ORDER — OXYCODONE HYDROCHLORIDE 5 MG/1
5 TABLET ORAL EVERY 4 HOURS PRN
Qty: 24 TABLET | Refills: 0 | Status: ON HOLD | OUTPATIENT
Start: 2022-01-01 | End: 2023-01-01

## 2022-01-01 RX ORDER — ONDANSETRON 2 MG/ML
4 INJECTION INTRAMUSCULAR; INTRAVENOUS EVERY 30 MIN PRN
Status: DISCONTINUED | OUTPATIENT
Start: 2022-01-01 | End: 2022-01-01 | Stop reason: HOSPADM

## 2022-01-01 RX ORDER — METHYLPHENIDATE HYDROCHLORIDE 5 MG/1
5 TABLET ORAL
Status: DISCONTINUED | OUTPATIENT
Start: 2022-01-01 | End: 2022-01-01

## 2022-01-01 RX ORDER — ATORVASTATIN CALCIUM 40 MG/1
40 TABLET, FILM COATED ORAL DAILY
Status: DISCONTINUED | OUTPATIENT
Start: 2022-01-01 | End: 2022-01-01 | Stop reason: HOSPADM

## 2022-01-01 RX ORDER — PROCHLORPERAZINE MALEATE 5 MG
5 TABLET ORAL EVERY 6 HOURS PRN
Status: DISCONTINUED | OUTPATIENT
Start: 2022-01-01 | End: 2023-01-01

## 2022-01-01 RX ORDER — ATORVASTATIN CALCIUM 40 MG/1
40 TABLET, FILM COATED ORAL DAILY
Status: DISCONTINUED | OUTPATIENT
Start: 2022-01-01 | End: 2023-01-01 | Stop reason: HOSPADM

## 2022-01-01 RX ADMIN — MICONAZOLE NITRATE: 20 POWDER TOPICAL at 09:21

## 2022-01-01 RX ADMIN — APIXABAN 5 MG: 5 TABLET, FILM COATED ORAL at 08:09

## 2022-01-01 RX ADMIN — METRONIDAZOLE 500 MG: 500 TABLET ORAL at 16:26

## 2022-01-01 RX ADMIN — LIRAGLUTIDE 1.8 MG: 6 INJECTION SUBCUTANEOUS at 09:05

## 2022-01-01 RX ADMIN — OXYCODONE HYDROCHLORIDE 10 MG: 10 TABLET ORAL at 23:13

## 2022-01-01 RX ADMIN — ACETAMINOPHEN 650 MG: 325 TABLET, FILM COATED ORAL at 23:13

## 2022-01-01 RX ADMIN — PIOGLITAZONE HYDROCHLORIDE 45 MG: 45 TABLET ORAL at 09:02

## 2022-01-01 RX ADMIN — INSULIN ASPART 2 UNITS: 100 INJECTION, SOLUTION INTRAVENOUS; SUBCUTANEOUS at 10:52

## 2022-01-01 RX ADMIN — METRONIDAZOLE 500 MG: 500 TABLET ORAL at 14:12

## 2022-01-01 RX ADMIN — FUROSEMIDE 20 MG: 20 TABLET ORAL at 09:42

## 2022-01-01 RX ADMIN — METFORMIN HYDROCHLORIDE 2000 MG: 750 TABLET, EXTENDED RELEASE ORAL at 08:32

## 2022-01-01 RX ADMIN — ATORVASTATIN CALCIUM 40 MG: 40 TABLET, FILM COATED ORAL at 08:49

## 2022-01-01 RX ADMIN — INSULIN ASPART 1 UNITS: 100 INJECTION, SOLUTION INTRAVENOUS; SUBCUTANEOUS at 18:11

## 2022-01-01 RX ADMIN — FUROSEMIDE 20 MG: 20 TABLET ORAL at 09:12

## 2022-01-01 RX ADMIN — APIXABAN 2.5 MG: 2.5 TABLET, FILM COATED ORAL at 20:14

## 2022-01-01 RX ADMIN — OXYCODONE HYDROCHLORIDE 5 MG: 5 TABLET ORAL at 22:49

## 2022-01-01 RX ADMIN — CIPROFLOXACIN HYDROCHLORIDE 500 MG: 500 TABLET, FILM COATED ORAL at 09:10

## 2022-01-01 RX ADMIN — METHOCARBAMOL 500 MG: 500 TABLET ORAL at 14:04

## 2022-01-01 RX ADMIN — METHOCARBAMOL 500 MG: 500 TABLET ORAL at 19:54

## 2022-01-01 RX ADMIN — SENNOSIDES AND DOCUSATE SODIUM 1 TABLET: 50; 8.6 TABLET ORAL at 08:35

## 2022-01-01 RX ADMIN — OXYCODONE HYDROCHLORIDE 5 MG: 5 TABLET ORAL at 09:36

## 2022-01-01 RX ADMIN — CIPROFLOXACIN HYDROCHLORIDE 500 MG: 500 TABLET, FILM COATED ORAL at 08:30

## 2022-01-01 RX ADMIN — APIXABAN 5 MG: 5 TABLET, FILM COATED ORAL at 09:40

## 2022-01-01 RX ADMIN — OXYCODONE HYDROCHLORIDE 10 MG: 10 TABLET ORAL at 08:54

## 2022-01-01 RX ADMIN — CIPROFLOXACIN HYDROCHLORIDE 500 MG: 500 TABLET, FILM COATED ORAL at 08:36

## 2022-01-01 RX ADMIN — PIPERACILLIN AND TAZOBACTAM 3.38 G: 3; .375 INJECTION, POWDER, LYOPHILIZED, FOR SOLUTION INTRAVENOUS at 09:44

## 2022-01-01 RX ADMIN — METFORMIN ER 500 MG 2000 MG: 500 TABLET ORAL at 11:10

## 2022-01-01 RX ADMIN — Medication 1 CAPSULE: at 09:29

## 2022-01-01 RX ADMIN — Medication 1 CAPSULE: at 19:51

## 2022-01-01 RX ADMIN — APIXABAN 5 MG: 5 TABLET, FILM COATED ORAL at 20:34

## 2022-01-01 RX ADMIN — APIXABAN 2.5 MG: 2.5 TABLET, FILM COATED ORAL at 08:30

## 2022-01-01 RX ADMIN — PIOGLITAZONE 45 MG: 45 TABLET ORAL at 08:50

## 2022-01-01 RX ADMIN — PIPERACILLIN AND TAZOBACTAM 3.38 G: 3; .375 INJECTION, POWDER, LYOPHILIZED, FOR SOLUTION INTRAVENOUS at 21:44

## 2022-01-01 RX ADMIN — OXYCODONE HYDROCHLORIDE 5 MG: 5 TABLET ORAL at 15:02

## 2022-01-01 RX ADMIN — PIPERACILLIN AND TAZOBACTAM 3.38 G: 3; .375 INJECTION, POWDER, LYOPHILIZED, FOR SOLUTION INTRAVENOUS at 02:01

## 2022-01-01 RX ADMIN — METFORMIN ER 500 MG 2000 MG: 500 TABLET ORAL at 11:33

## 2022-01-01 RX ADMIN — ATORVASTATIN CALCIUM 40 MG: 40 TABLET, FILM COATED ORAL at 11:10

## 2022-01-01 RX ADMIN — FUROSEMIDE 20 MG: 20 TABLET ORAL at 10:05

## 2022-01-01 RX ADMIN — OXYCODONE HYDROCHLORIDE 10 MG: 10 TABLET ORAL at 18:38

## 2022-01-01 RX ADMIN — OXYCODONE HYDROCHLORIDE 10 MG: 10 TABLET ORAL at 06:46

## 2022-01-01 RX ADMIN — METHOCARBAMOL 500 MG: 500 TABLET ORAL at 20:33

## 2022-01-01 RX ADMIN — INSULIN ASPART 1 UNITS: 100 INJECTION, SOLUTION INTRAVENOUS; SUBCUTANEOUS at 09:22

## 2022-01-01 RX ADMIN — OXYCODONE HYDROCHLORIDE 10 MG: 10 TABLET ORAL at 10:24

## 2022-01-01 RX ADMIN — OXYCODONE HYDROCHLORIDE 10 MG: 10 TABLET ORAL at 05:48

## 2022-01-01 RX ADMIN — OXYCODONE HYDROCHLORIDE 10 MG: 10 TABLET ORAL at 18:17

## 2022-01-01 RX ADMIN — Medication 10 MG: at 14:21

## 2022-01-01 RX ADMIN — MICONAZOLE NITRATE: 20 POWDER TOPICAL at 20:58

## 2022-01-01 RX ADMIN — ATORVASTATIN CALCIUM 40 MG: 40 TABLET, FILM COATED ORAL at 08:37

## 2022-01-01 RX ADMIN — LIRAGLUTIDE 1.8 MG: 6 INJECTION SUBCUTANEOUS at 08:30

## 2022-01-01 RX ADMIN — METHOCARBAMOL 500 MG: 500 TABLET ORAL at 10:17

## 2022-01-01 RX ADMIN — OXYCODONE HYDROCHLORIDE 10 MG: 10 TABLET ORAL at 15:01

## 2022-01-01 RX ADMIN — OXYCODONE HYDROCHLORIDE 5 MG: 5 TABLET ORAL at 08:33

## 2022-01-01 RX ADMIN — INSULIN ASPART 1 UNITS: 100 INJECTION, SOLUTION INTRAVENOUS; SUBCUTANEOUS at 19:01

## 2022-01-01 RX ADMIN — OXYCODONE HYDROCHLORIDE 10 MG: 10 TABLET ORAL at 07:02

## 2022-01-01 RX ADMIN — OXYCODONE HYDROCHLORIDE 10 MG: 10 TABLET ORAL at 16:52

## 2022-01-01 RX ADMIN — OXYCODONE HYDROCHLORIDE 5 MG: 5 TABLET ORAL at 07:04

## 2022-01-01 RX ADMIN — METRONIDAZOLE 500 MG: 500 TABLET ORAL at 15:12

## 2022-01-01 RX ADMIN — CIPROFLOXACIN 500 MG: 500 TABLET, FILM COATED ORAL at 11:34

## 2022-01-01 RX ADMIN — METRONIDAZOLE 500 MG: 500 TABLET ORAL at 20:13

## 2022-01-01 RX ADMIN — APIXABAN 2.5 MG: 2.5 TABLET, FILM COATED ORAL at 08:48

## 2022-01-01 RX ADMIN — PIPERACILLIN AND TAZOBACTAM 3.38 G: 3; .375 INJECTION, POWDER, LYOPHILIZED, FOR SOLUTION INTRAVENOUS at 09:16

## 2022-01-01 RX ADMIN — OXYCODONE HYDROCHLORIDE 5 MG: 5 TABLET ORAL at 18:02

## 2022-01-01 RX ADMIN — VANCOMYCIN HYDROCHLORIDE 1500 MG: 10 INJECTION, POWDER, LYOPHILIZED, FOR SOLUTION INTRAVENOUS at 03:04

## 2022-01-01 RX ADMIN — APIXABAN 2.5 MG: 2.5 TABLET, FILM COATED ORAL at 19:01

## 2022-01-01 RX ADMIN — ATORVASTATIN CALCIUM 40 MG: 40 TABLET, FILM COATED ORAL at 08:08

## 2022-01-01 RX ADMIN — METRONIDAZOLE 500 MG: 500 TABLET ORAL at 19:55

## 2022-01-01 RX ADMIN — OXYCODONE HYDROCHLORIDE 10 MG: 10 TABLET ORAL at 20:16

## 2022-01-01 RX ADMIN — PIPERACILLIN AND TAZOBACTAM 3.38 G: 3; .375 INJECTION, POWDER, LYOPHILIZED, FOR SOLUTION INTRAVENOUS at 20:45

## 2022-01-01 RX ADMIN — PIOGLITAZONE HYDROCHLORIDE 45 MG: 45 TABLET ORAL at 08:29

## 2022-01-01 RX ADMIN — METHOCARBAMOL 500 MG: 500 TABLET ORAL at 20:07

## 2022-01-01 RX ADMIN — FENTANYL CITRATE 25 MCG: 50 INJECTION, SOLUTION INTRAMUSCULAR; INTRAVENOUS at 21:38

## 2022-01-01 RX ADMIN — ONDANSETRON 4 MG: 2 INJECTION INTRAMUSCULAR; INTRAVENOUS at 10:46

## 2022-01-01 RX ADMIN — SODIUM CHLORIDE: 9 INJECTION, SOLUTION INTRAVENOUS at 03:05

## 2022-01-01 RX ADMIN — METRONIDAZOLE 500 MG: 500 TABLET ORAL at 21:09

## 2022-01-01 RX ADMIN — FUROSEMIDE 20 MG: 20 TABLET ORAL at 11:11

## 2022-01-01 RX ADMIN — METRONIDAZOLE 500 MG: 500 TABLET ORAL at 13:42

## 2022-01-01 RX ADMIN — ATORVASTATIN CALCIUM 40 MG: 40 TABLET, FILM COATED ORAL at 08:15

## 2022-01-01 RX ADMIN — CIPROFLOXACIN HYDROCHLORIDE 500 MG: 500 TABLET, FILM COATED ORAL at 09:28

## 2022-01-01 RX ADMIN — METRONIDAZOLE 500 MG: 500 TABLET ORAL at 08:09

## 2022-01-01 RX ADMIN — ACETAMINOPHEN 650 MG: 325 TABLET, FILM COATED ORAL at 08:38

## 2022-01-01 RX ADMIN — METFORMIN HYDROCHLORIDE 2000 MG: 750 TABLET, EXTENDED RELEASE ORAL at 08:28

## 2022-01-01 RX ADMIN — METRONIDAZOLE 500 MG: 500 TABLET ORAL at 20:04

## 2022-01-01 RX ADMIN — PIPERACILLIN AND TAZOBACTAM 3.38 G: 3; .375 INJECTION, POWDER, LYOPHILIZED, FOR SOLUTION INTRAVENOUS at 03:12

## 2022-01-01 RX ADMIN — ATORVASTATIN CALCIUM 40 MG: 40 TABLET, FILM COATED ORAL at 10:43

## 2022-01-01 RX ADMIN — INSULIN ASPART 2 UNITS: 100 INJECTION, SOLUTION INTRAVENOUS; SUBCUTANEOUS at 18:12

## 2022-01-01 RX ADMIN — METHOCARBAMOL 500 MG: 500 TABLET ORAL at 12:00

## 2022-01-01 RX ADMIN — CIPROFLOXACIN HYDROCHLORIDE 500 MG: 500 TABLET, FILM COATED ORAL at 08:59

## 2022-01-01 RX ADMIN — CIPROFLOXACIN HYDROCHLORIDE 500 MG: 500 TABLET, FILM COATED ORAL at 19:56

## 2022-01-01 RX ADMIN — INSULIN ASPART 2 UNITS: 100 INJECTION, SOLUTION INTRAVENOUS; SUBCUTANEOUS at 14:11

## 2022-01-01 RX ADMIN — OXYCODONE HYDROCHLORIDE 5 MG: 5 TABLET ORAL at 20:48

## 2022-01-01 RX ADMIN — ACETAMINOPHEN 650 MG: 325 TABLET, FILM COATED ORAL at 21:43

## 2022-01-01 RX ADMIN — Medication 1 CAPSULE: at 10:44

## 2022-01-01 RX ADMIN — PROPOFOL 200 MG: 10 INJECTION, EMULSION INTRAVENOUS at 12:21

## 2022-01-01 RX ADMIN — OXYCODONE HYDROCHLORIDE 10 MG: 10 TABLET ORAL at 00:56

## 2022-01-01 RX ADMIN — OXYCODONE HYDROCHLORIDE 10 MG: 10 TABLET ORAL at 12:28

## 2022-01-01 RX ADMIN — OXYCODONE HYDROCHLORIDE 10 MG: 10 TABLET ORAL at 08:36

## 2022-01-01 RX ADMIN — OXYCODONE HYDROCHLORIDE 10 MG: 10 TABLET ORAL at 00:36

## 2022-01-01 RX ADMIN — ACETAMINOPHEN 650 MG: 325 TABLET, FILM COATED ORAL at 09:53

## 2022-01-01 RX ADMIN — APIXABAN 5 MG: 5 TABLET, FILM COATED ORAL at 19:51

## 2022-01-01 RX ADMIN — PIOGLITAZONE HYDROCHLORIDE 45 MG: 45 TABLET ORAL at 08:59

## 2022-01-01 RX ADMIN — SODIUM CHLORIDE, POTASSIUM CHLORIDE, SODIUM LACTATE AND CALCIUM CHLORIDE: 600; 310; 30; 20 INJECTION, SOLUTION INTRAVENOUS at 12:07

## 2022-01-01 RX ADMIN — PIPERACILLIN AND TAZOBACTAM 3.38 G: 3; .375 INJECTION, POWDER, LYOPHILIZED, FOR SOLUTION INTRAVENOUS at 08:36

## 2022-01-01 RX ADMIN — ATORVASTATIN CALCIUM 40 MG: 40 TABLET, FILM COATED ORAL at 09:15

## 2022-01-01 RX ADMIN — INSULIN ASPART 1 UNITS: 100 INJECTION, SOLUTION INTRAVENOUS; SUBCUTANEOUS at 11:23

## 2022-01-01 RX ADMIN — METHOCARBAMOL 500 MG: 500 TABLET ORAL at 07:03

## 2022-01-01 RX ADMIN — OXYCODONE HYDROCHLORIDE 10 MG: 10 TABLET ORAL at 04:59

## 2022-01-01 RX ADMIN — LACTULOSE 20 G: 20 SOLUTION ORAL at 20:32

## 2022-01-01 RX ADMIN — Medication 1 CAPSULE: at 20:30

## 2022-01-01 RX ADMIN — METHOCARBAMOL 500 MG: 500 TABLET ORAL at 21:00

## 2022-01-01 RX ADMIN — METRONIDAZOLE 500 MG: 500 TABLET ORAL at 08:11

## 2022-01-01 RX ADMIN — Medication 1 CAPSULE: at 13:02

## 2022-01-01 RX ADMIN — APIXABAN 5 MG: 5 TABLET, FILM COATED ORAL at 20:13

## 2022-01-01 RX ADMIN — OXYCODONE HYDROCHLORIDE 5 MG: 5 TABLET ORAL at 10:41

## 2022-01-01 RX ADMIN — PIPERACILLIN AND TAZOBACTAM 3.38 G: 3; .375 INJECTION, POWDER, LYOPHILIZED, FOR SOLUTION INTRAVENOUS at 11:07

## 2022-01-01 RX ADMIN — METRONIDAZOLE 500 MG: 500 TABLET ORAL at 20:14

## 2022-01-01 RX ADMIN — OXYCODONE HYDROCHLORIDE 10 MG: 10 TABLET ORAL at 03:50

## 2022-01-01 RX ADMIN — CIPROFLOXACIN HYDROCHLORIDE 500 MG: 500 TABLET, FILM COATED ORAL at 08:08

## 2022-01-01 RX ADMIN — ATORVASTATIN CALCIUM 40 MG: 40 TABLET, FILM COATED ORAL at 07:44

## 2022-01-01 RX ADMIN — ACETAMINOPHEN 650 MG: 325 TABLET, FILM COATED ORAL at 18:17

## 2022-01-01 RX ADMIN — INSULIN ASPART 1 UNITS: 100 INJECTION, SOLUTION INTRAVENOUS; SUBCUTANEOUS at 13:04

## 2022-01-01 RX ADMIN — SODIUM CHLORIDE: 9 INJECTION, SOLUTION INTRAVENOUS at 11:00

## 2022-01-01 RX ADMIN — APIXABAN 5 MG: 5 TABLET, FILM COATED ORAL at 08:08

## 2022-01-01 RX ADMIN — OXYCODONE HYDROCHLORIDE 10 MG: 10 TABLET ORAL at 00:50

## 2022-01-01 RX ADMIN — PIOGLITAZONE HYDROCHLORIDE 45 MG: 45 TABLET ORAL at 10:05

## 2022-01-01 RX ADMIN — CIPROFLOXACIN HYDROCHLORIDE 500 MG: 500 TABLET, FILM COATED ORAL at 21:10

## 2022-01-01 RX ADMIN — INSULIN ASPART 2 UNITS: 100 INJECTION, SOLUTION INTRAVENOUS; SUBCUTANEOUS at 10:34

## 2022-01-01 RX ADMIN — LIRAGLUTIDE 1.8 MG: 6 INJECTION SUBCUTANEOUS at 09:01

## 2022-01-01 RX ADMIN — FUROSEMIDE 20 MG: 20 TABLET ORAL at 09:02

## 2022-01-01 RX ADMIN — ALBUMIN HUMAN: 0.05 INJECTION, SOLUTION INTRAVENOUS at 18:57

## 2022-01-01 RX ADMIN — CIPROFLOXACIN HYDROCHLORIDE 500 MG: 500 TABLET, FILM COATED ORAL at 09:03

## 2022-01-01 RX ADMIN — LIRAGLUTIDE 1.8 MG: 6 INJECTION SUBCUTANEOUS at 09:04

## 2022-01-01 RX ADMIN — PIOGLITAZONE HYDROCHLORIDE 45 MG: 45 TABLET ORAL at 09:53

## 2022-01-01 RX ADMIN — ASPIRIN 162 MG: 81 TABLET ORAL at 08:35

## 2022-01-01 RX ADMIN — OXYCODONE HYDROCHLORIDE 5 MG: 5 TABLET ORAL at 23:01

## 2022-01-01 RX ADMIN — ATORVASTATIN CALCIUM 40 MG: 40 TABLET, FILM COATED ORAL at 09:40

## 2022-01-01 RX ADMIN — ATORVASTATIN CALCIUM 40 MG: 40 TABLET, FILM COATED ORAL at 08:36

## 2022-01-01 RX ADMIN — OXYCODONE HYDROCHLORIDE 5 MG: 5 TABLET ORAL at 13:53

## 2022-01-01 RX ADMIN — Medication 1 CAPSULE: at 11:59

## 2022-01-01 RX ADMIN — APIXABAN 5 MG: 5 TABLET, FILM COATED ORAL at 08:18

## 2022-01-01 RX ADMIN — OXYCODONE HYDROCHLORIDE 5 MG: 5 TABLET ORAL at 03:01

## 2022-01-01 RX ADMIN — OXYCODONE HYDROCHLORIDE 5 MG: 5 TABLET ORAL at 06:39

## 2022-01-01 RX ADMIN — INSULIN ASPART 1 UNITS: 100 INJECTION, SOLUTION INTRAVENOUS; SUBCUTANEOUS at 13:17

## 2022-01-01 RX ADMIN — ASPIRIN 162 MG: 81 TABLET ORAL at 07:45

## 2022-01-01 RX ADMIN — APIXABAN 2.5 MG: 2.5 TABLET, FILM COATED ORAL at 19:46

## 2022-01-01 RX ADMIN — ACETAMINOPHEN 650 MG: 325 TABLET, FILM COATED ORAL at 09:00

## 2022-01-01 RX ADMIN — CIPROFLOXACIN HYDROCHLORIDE 500 MG: 500 TABLET, FILM COATED ORAL at 09:41

## 2022-01-01 RX ADMIN — Medication 1 CAPSULE: at 20:19

## 2022-01-01 RX ADMIN — MICONAZOLE NITRATE: 20 POWDER TOPICAL at 21:24

## 2022-01-01 RX ADMIN — Medication 1 CAPSULE: at 19:56

## 2022-01-01 RX ADMIN — PIOGLITAZONE 45 MG: 45 TABLET ORAL at 10:30

## 2022-01-01 RX ADMIN — FUROSEMIDE 20 MG: 20 TABLET ORAL at 08:33

## 2022-01-01 RX ADMIN — Medication 1 CAPSULE: at 08:11

## 2022-01-01 RX ADMIN — ACETAMINOPHEN 650 MG: 325 TABLET, FILM COATED ORAL at 12:57

## 2022-01-01 RX ADMIN — Medication 90 MG: at 12:21

## 2022-01-01 RX ADMIN — PIPERACILLIN AND TAZOBACTAM 3.38 G: 3; .375 INJECTION, POWDER, LYOPHILIZED, FOR SOLUTION INTRAVENOUS at 20:15

## 2022-01-01 RX ADMIN — PIPERACILLIN AND TAZOBACTAM 3.38 G: 3; .375 INJECTION, POWDER, LYOPHILIZED, FOR SOLUTION INTRAVENOUS at 08:46

## 2022-01-01 RX ADMIN — Medication 1 CAPSULE: at 08:48

## 2022-01-01 RX ADMIN — CIPROFLOXACIN HYDROCHLORIDE 500 MG: 500 TABLET, FILM COATED ORAL at 22:27

## 2022-01-01 RX ADMIN — METRONIDAZOLE 500 MG: 500 TABLET ORAL at 10:05

## 2022-01-01 RX ADMIN — METRONIDAZOLE 500 MG: 500 TABLET ORAL at 09:10

## 2022-01-01 RX ADMIN — MICONAZOLE NITRATE: 20 POWDER TOPICAL at 09:02

## 2022-01-01 RX ADMIN — SENNOSIDES AND DOCUSATE SODIUM 1 TABLET: 50; 8.6 TABLET ORAL at 08:26

## 2022-01-01 RX ADMIN — ALBUTEROL SULFATE 3 ML: 2.5 SOLUTION RESPIRATORY (INHALATION) at 14:43

## 2022-01-01 RX ADMIN — METRONIDAZOLE 500 MG: 500 TABLET ORAL at 09:12

## 2022-01-01 RX ADMIN — OXYCODONE HYDROCHLORIDE 10 MG: 10 TABLET ORAL at 13:20

## 2022-01-01 RX ADMIN — ACETAMINOPHEN 650 MG: 325 TABLET, FILM COATED ORAL at 23:01

## 2022-01-01 RX ADMIN — OXYCODONE HYDROCHLORIDE 10 MG: 10 TABLET ORAL at 16:55

## 2022-01-01 RX ADMIN — CIPROFLOXACIN HYDROCHLORIDE 500 MG: 500 TABLET, FILM COATED ORAL at 20:14

## 2022-01-01 RX ADMIN — FUROSEMIDE 20 MG: 20 TABLET ORAL at 08:54

## 2022-01-01 RX ADMIN — METHOCARBAMOL 500 MG: 500 TABLET ORAL at 12:02

## 2022-01-01 RX ADMIN — SODIUM CHLORIDE: 9 INJECTION, SOLUTION INTRAVENOUS at 09:59

## 2022-01-01 RX ADMIN — FUROSEMIDE 20 MG: 20 TABLET ORAL at 08:12

## 2022-01-01 RX ADMIN — INSULIN ASPART 1 UNITS: 100 INJECTION, SOLUTION INTRAVENOUS; SUBCUTANEOUS at 13:14

## 2022-01-01 RX ADMIN — OXYCODONE HYDROCHLORIDE 5 MG: 5 TABLET ORAL at 18:46

## 2022-01-01 RX ADMIN — CIPROFLOXACIN 500 MG: 500 TABLET, FILM COATED ORAL at 09:13

## 2022-01-01 RX ADMIN — OXYCODONE HYDROCHLORIDE 10 MG: 10 TABLET ORAL at 15:12

## 2022-01-01 RX ADMIN — OXYCODONE HYDROCHLORIDE 10 MG: 10 TABLET ORAL at 03:39

## 2022-01-01 RX ADMIN — FUROSEMIDE 20 MG: 20 TABLET ORAL at 09:10

## 2022-01-01 RX ADMIN — METFORMIN HYDROCHLORIDE 2000 MG: 750 TABLET, EXTENDED RELEASE ORAL at 09:33

## 2022-01-01 RX ADMIN — HYDROXYZINE HYDROCHLORIDE 10 MG: 10 TABLET ORAL at 07:45

## 2022-01-01 RX ADMIN — OXYCODONE HYDROCHLORIDE 10 MG: 10 TABLET ORAL at 19:53

## 2022-01-01 RX ADMIN — HYDROXYZINE HYDROCHLORIDE 10 MG: 10 TABLET ORAL at 20:32

## 2022-01-01 RX ADMIN — FUROSEMIDE 20 MG: 20 TABLET ORAL at 11:07

## 2022-01-01 RX ADMIN — METRONIDAZOLE 500 MG: 500 TABLET ORAL at 16:04

## 2022-01-01 RX ADMIN — INSULIN ASPART 3 UNITS: 100 INJECTION, SOLUTION INTRAVENOUS; SUBCUTANEOUS at 14:21

## 2022-01-01 RX ADMIN — ATORVASTATIN CALCIUM 40 MG: 40 TABLET, FILM COATED ORAL at 08:30

## 2022-01-01 RX ADMIN — ACETAMINOPHEN 975 MG: 325 TABLET, FILM COATED ORAL at 22:48

## 2022-01-01 RX ADMIN — PIPERACILLIN AND TAZOBACTAM 3.38 G: 3; .375 INJECTION, POWDER, LYOPHILIZED, FOR SOLUTION INTRAVENOUS at 08:06

## 2022-01-01 RX ADMIN — Medication 1 CAPSULE: at 09:41

## 2022-01-01 RX ADMIN — INSULIN ASPART 1 UNITS: 100 INJECTION, SOLUTION INTRAVENOUS; SUBCUTANEOUS at 13:30

## 2022-01-01 RX ADMIN — ATORVASTATIN CALCIUM 40 MG: 40 TABLET, FILM COATED ORAL at 08:59

## 2022-01-01 RX ADMIN — CIPROFLOXACIN HYDROCHLORIDE 500 MG: 500 TABLET, FILM COATED ORAL at 21:54

## 2022-01-01 RX ADMIN — ACETAMINOPHEN 650 MG: 325 TABLET, FILM COATED ORAL at 09:13

## 2022-01-01 RX ADMIN — SENNOSIDES AND DOCUSATE SODIUM 2 TABLET: 8.6; 5 TABLET ORAL at 07:44

## 2022-01-01 RX ADMIN — APIXABAN 2.5 MG: 2.5 TABLET, FILM COATED ORAL at 09:11

## 2022-01-01 RX ADMIN — CIPROFLOXACIN HYDROCHLORIDE 500 MG: 500 TABLET, FILM COATED ORAL at 10:28

## 2022-01-01 RX ADMIN — CIPROFLOXACIN HYDROCHLORIDE 500 MG: 500 TABLET, FILM COATED ORAL at 20:53

## 2022-01-01 RX ADMIN — VANCOMYCIN HYDROCHLORIDE 1500 MG: 10 INJECTION, POWDER, LYOPHILIZED, FOR SOLUTION INTRAVENOUS at 06:19

## 2022-01-01 RX ADMIN — MICONAZOLE NITRATE: 20 POWDER TOPICAL at 20:06

## 2022-01-01 RX ADMIN — Medication 1 CAPSULE: at 21:18

## 2022-01-01 RX ADMIN — METRONIDAZOLE 500 MG: 500 TABLET ORAL at 08:29

## 2022-01-01 RX ADMIN — OXYCODONE HYDROCHLORIDE 10 MG: 10 TABLET ORAL at 08:34

## 2022-01-01 RX ADMIN — Medication 1 CAPSULE: at 20:24

## 2022-01-01 RX ADMIN — Medication 1 CAPSULE: at 21:16

## 2022-01-01 RX ADMIN — OXYCODONE HYDROCHLORIDE 10 MG: 10 TABLET ORAL at 05:42

## 2022-01-01 RX ADMIN — ATORVASTATIN CALCIUM 40 MG: 40 TABLET, FILM COATED ORAL at 08:06

## 2022-01-01 RX ADMIN — METRONIDAZOLE 500 MG: 500 TABLET ORAL at 08:54

## 2022-01-01 RX ADMIN — METRONIDAZOLE 500 MG: 500 TABLET ORAL at 14:36

## 2022-01-01 RX ADMIN — METRONIDAZOLE 500 MG: 500 TABLET ORAL at 10:44

## 2022-01-01 RX ADMIN — METRONIDAZOLE 500 MG: 500 TABLET ORAL at 15:23

## 2022-01-01 RX ADMIN — OXYCODONE HYDROCHLORIDE 10 MG: 10 TABLET ORAL at 10:17

## 2022-01-01 RX ADMIN — APIXABAN 2.5 MG: 2.5 TABLET, FILM COATED ORAL at 20:11

## 2022-01-01 RX ADMIN — CIPROFLOXACIN HYDROCHLORIDE 500 MG: 500 TABLET, FILM COATED ORAL at 20:04

## 2022-01-01 RX ADMIN — METFORMIN HYDROCHLORIDE 2000 MG: 750 TABLET, EXTENDED RELEASE ORAL at 09:41

## 2022-01-01 RX ADMIN — PIOGLITAZONE HYDROCHLORIDE 45 MG: 45 TABLET ORAL at 11:10

## 2022-01-01 RX ADMIN — FUROSEMIDE 20 MG: 20 TABLET ORAL at 08:30

## 2022-01-01 RX ADMIN — CIPROFLOXACIN HYDROCHLORIDE 500 MG: 500 TABLET, FILM COATED ORAL at 11:32

## 2022-01-01 RX ADMIN — INSULIN ASPART 1 UNITS: 100 INJECTION, SOLUTION INTRAVENOUS; SUBCUTANEOUS at 19:25

## 2022-01-01 RX ADMIN — PIPERACILLIN AND TAZOBACTAM 3.38 G: 3; .375 INJECTION, POWDER, LYOPHILIZED, FOR SOLUTION INTRAVENOUS at 20:38

## 2022-01-01 RX ADMIN — METRONIDAZOLE 500 MG: 500 TABLET ORAL at 08:59

## 2022-01-01 RX ADMIN — CIPROFLOXACIN HYDROCHLORIDE 500 MG: 500 TABLET, FILM COATED ORAL at 08:05

## 2022-01-01 RX ADMIN — MICONAZOLE NITRATE: 20 POWDER TOPICAL at 08:53

## 2022-01-01 RX ADMIN — METFORMIN HYDROCHLORIDE 2000 MG: 750 TABLET, EXTENDED RELEASE ORAL at 09:12

## 2022-01-01 RX ADMIN — OXYCODONE HYDROCHLORIDE 10 MG: 10 TABLET ORAL at 23:48

## 2022-01-01 RX ADMIN — PIPERACILLIN AND TAZOBACTAM 3.38 G: 3; .375 INJECTION, POWDER, LYOPHILIZED, FOR SOLUTION INTRAVENOUS at 20:20

## 2022-01-01 RX ADMIN — METRONIDAZOLE 500 MG: 500 TABLET ORAL at 08:31

## 2022-01-01 RX ADMIN — METRONIDAZOLE 500 MG: 500 TABLET ORAL at 21:34

## 2022-01-01 RX ADMIN — ACETAMINOPHEN 650 MG: 325 TABLET, FILM COATED ORAL at 19:01

## 2022-01-01 RX ADMIN — CIPROFLOXACIN HYDROCHLORIDE 500 MG: 500 TABLET, FILM COATED ORAL at 10:42

## 2022-01-01 RX ADMIN — METRONIDAZOLE 500 MG: 500 TABLET ORAL at 15:40

## 2022-01-01 RX ADMIN — CIPROFLOXACIN HYDROCHLORIDE 500 MG: 500 TABLET, FILM COATED ORAL at 08:54

## 2022-01-01 RX ADMIN — ACETAMINOPHEN 650 MG: 325 TABLET, FILM COATED ORAL at 18:46

## 2022-01-01 RX ADMIN — APIXABAN 5 MG: 5 TABLET, FILM COATED ORAL at 21:08

## 2022-01-01 RX ADMIN — SODIUM CHLORIDE, SODIUM GLUCONATE, SODIUM ACETATE, POTASSIUM CHLORIDE AND MAGNESIUM CHLORIDE: 526; 502; 368; 37; 30 INJECTION, SOLUTION INTRAVENOUS at 15:28

## 2022-01-01 RX ADMIN — ATORVASTATIN CALCIUM 40 MG: 40 TABLET, FILM COATED ORAL at 08:47

## 2022-01-01 RX ADMIN — ACETAMINOPHEN 650 MG: 325 TABLET, FILM COATED ORAL at 14:17

## 2022-01-01 RX ADMIN — OXYCODONE HYDROCHLORIDE 10 MG: 10 TABLET ORAL at 00:05

## 2022-01-01 RX ADMIN — METRONIDAZOLE 500 MG: 500 TABLET ORAL at 21:32

## 2022-01-01 RX ADMIN — SENNOSIDES AND DOCUSATE SODIUM 1 TABLET: 50; 8.6 TABLET ORAL at 20:34

## 2022-01-01 RX ADMIN — METRONIDAZOLE 500 MG: 500 TABLET ORAL at 20:19

## 2022-01-01 RX ADMIN — PHENYLEPHRINE HYDROCHLORIDE 100 MCG: 10 INJECTION INTRAVENOUS at 18:06

## 2022-01-01 RX ADMIN — APIXABAN 5 MG: 5 TABLET, FILM COATED ORAL at 09:33

## 2022-01-01 RX ADMIN — MICONAZOLE NITRATE: 20 POWDER TOPICAL at 10:06

## 2022-01-01 RX ADMIN — OXYCODONE HYDROCHLORIDE 10 MG: 10 TABLET ORAL at 10:33

## 2022-01-01 RX ADMIN — POLYETHYLENE GLYCOL 3350 17 G: 17 POWDER, FOR SOLUTION ORAL at 08:36

## 2022-01-01 RX ADMIN — APIXABAN 2.5 MG: 2.5 TABLET, FILM COATED ORAL at 19:56

## 2022-01-01 RX ADMIN — CIPROFLOXACIN HYDROCHLORIDE 500 MG: 500 TABLET, FILM COATED ORAL at 08:31

## 2022-01-01 RX ADMIN — ACETAMINOPHEN 650 MG: 325 TABLET, FILM COATED ORAL at 12:00

## 2022-01-01 RX ADMIN — CIPROFLOXACIN HYDROCHLORIDE 500 MG: 500 TABLET, FILM COATED ORAL at 20:35

## 2022-01-01 RX ADMIN — ACETAMINOPHEN 650 MG: 325 TABLET, FILM COATED ORAL at 08:15

## 2022-01-01 RX ADMIN — APIXABAN 2.5 MG: 2.5 TABLET, FILM COATED ORAL at 21:09

## 2022-01-01 RX ADMIN — Medication 1 CAPSULE: at 20:34

## 2022-01-01 RX ADMIN — ACETAMINOPHEN 975 MG: 325 TABLET, FILM COATED ORAL at 13:56

## 2022-01-01 RX ADMIN — FUROSEMIDE 20 MG: 20 TABLET ORAL at 08:49

## 2022-01-01 RX ADMIN — PIOGLITAZONE 45 MG: 45 TABLET ORAL at 08:30

## 2022-01-01 RX ADMIN — Medication 1 CAPSULE: at 22:07

## 2022-01-01 RX ADMIN — PSYLLIUM HUSK 1 PACKET: 3.4 POWDER ORAL at 23:48

## 2022-01-01 RX ADMIN — METFORMIN HYDROCHLORIDE 2000 MG: 750 TABLET, EXTENDED RELEASE ORAL at 08:36

## 2022-01-01 RX ADMIN — APIXABAN 2.5 MG: 2.5 TABLET, FILM COATED ORAL at 09:42

## 2022-01-01 RX ADMIN — ATORVASTATIN CALCIUM 40 MG: 40 TABLET, FILM COATED ORAL at 08:18

## 2022-01-01 RX ADMIN — ATORVASTATIN CALCIUM 40 MG: 40 TABLET, FILM COATED ORAL at 09:00

## 2022-01-01 RX ADMIN — PIPERACILLIN AND TAZOBACTAM 3.38 G: 3; .375 INJECTION, POWDER, LYOPHILIZED, FOR SOLUTION INTRAVENOUS at 02:43

## 2022-01-01 RX ADMIN — METRONIDAZOLE 500 MG: 500 TABLET ORAL at 19:31

## 2022-01-01 RX ADMIN — PIPERACILLIN AND TAZOBACTAM 3.38 G: 3; .375 INJECTION, POWDER, LYOPHILIZED, FOR SOLUTION INTRAVENOUS at 14:50

## 2022-01-01 RX ADMIN — APIXABAN 5 MG: 5 TABLET, FILM COATED ORAL at 20:30

## 2022-01-01 RX ADMIN — METRONIDAZOLE 500 MG: 500 TABLET ORAL at 11:10

## 2022-01-01 RX ADMIN — METFORMIN HYDROCHLORIDE 2000 MG: 750 TABLET, EXTENDED RELEASE ORAL at 09:34

## 2022-01-01 RX ADMIN — APIXABAN 2.5 MG: 2.5 TABLET, FILM COATED ORAL at 09:06

## 2022-01-01 RX ADMIN — FUROSEMIDE 20 MG: 20 TABLET ORAL at 08:47

## 2022-01-01 RX ADMIN — CIPROFLOXACIN HYDROCHLORIDE 500 MG: 500 TABLET, FILM COATED ORAL at 21:00

## 2022-01-01 RX ADMIN — PIOGLITAZONE 45 MG: 45 TABLET ORAL at 08:31

## 2022-01-01 RX ADMIN — MICONAZOLE NITRATE: 20 POWDER TOPICAL at 22:15

## 2022-01-01 RX ADMIN — METRONIDAZOLE 500 MG: 500 TABLET ORAL at 14:28

## 2022-01-01 RX ADMIN — METFORMIN ER 500 MG 2000 MG: 500 TABLET ORAL at 10:04

## 2022-01-01 RX ADMIN — ONDANSETRON 4 MG: 4 TABLET, ORALLY DISINTEGRATING ORAL at 08:29

## 2022-01-01 RX ADMIN — PIOGLITAZONE 45 MG: 45 TABLET ORAL at 09:12

## 2022-01-01 RX ADMIN — ATORVASTATIN CALCIUM 40 MG: 40 TABLET, FILM COATED ORAL at 09:29

## 2022-01-01 RX ADMIN — APIXABAN 5 MG: 5 TABLET, FILM COATED ORAL at 09:34

## 2022-01-01 RX ADMIN — ATORVASTATIN CALCIUM 40 MG: 40 TABLET, FILM COATED ORAL at 09:43

## 2022-01-01 RX ADMIN — METRONIDAZOLE 500 MG: 500 TABLET ORAL at 21:25

## 2022-01-01 RX ADMIN — PROPOFOL 30 MG: 10 INJECTION, EMULSION INTRAVENOUS at 12:35

## 2022-01-01 RX ADMIN — PIPERACILLIN AND TAZOBACTAM 3.38 G: 3; .375 INJECTION, POWDER, LYOPHILIZED, FOR SOLUTION INTRAVENOUS at 02:38

## 2022-01-01 RX ADMIN — Medication 20 MG: at 16:09

## 2022-01-01 RX ADMIN — APIXABAN 2.5 MG: 2.5 TABLET, FILM COATED ORAL at 20:07

## 2022-01-01 RX ADMIN — PIOGLITAZONE 45 MG: 45 TABLET ORAL at 08:09

## 2022-01-01 RX ADMIN — METRONIDAZOLE 500 MG: 500 TABLET ORAL at 21:00

## 2022-01-01 RX ADMIN — MICONAZOLE NITRATE: 20 POWDER TOPICAL at 09:44

## 2022-01-01 RX ADMIN — ASPIRIN 162 MG: 81 TABLET ORAL at 12:12

## 2022-01-01 RX ADMIN — Medication 1 CAPSULE: at 08:50

## 2022-01-01 RX ADMIN — METRONIDAZOLE 500 MG: 500 TABLET ORAL at 20:30

## 2022-01-01 RX ADMIN — CIPROFLOXACIN 500 MG: 500 TABLET, FILM COATED ORAL at 00:15

## 2022-01-01 RX ADMIN — ACETAMINOPHEN 650 MG: 325 TABLET, FILM COATED ORAL at 03:50

## 2022-01-01 RX ADMIN — OXYCODONE HYDROCHLORIDE 10 MG: 10 TABLET ORAL at 17:31

## 2022-01-01 RX ADMIN — ONDANSETRON 4 MG: 4 TABLET, ORALLY DISINTEGRATING ORAL at 11:32

## 2022-01-01 RX ADMIN — OXYCODONE HYDROCHLORIDE 5 MG: 5 TABLET ORAL at 17:58

## 2022-01-01 RX ADMIN — CIPROFLOXACIN HYDROCHLORIDE 500 MG: 500 TABLET, FILM COATED ORAL at 20:19

## 2022-01-01 RX ADMIN — PIOGLITAZONE HYDROCHLORIDE 45 MG: 45 TABLET ORAL at 11:33

## 2022-01-01 RX ADMIN — METRONIDAZOLE 500 MG: 500 TABLET ORAL at 14:42

## 2022-01-01 RX ADMIN — SODIUM CHLORIDE 500 ML: 9 INJECTION, SOLUTION INTRAVENOUS at 21:40

## 2022-01-01 RX ADMIN — METRONIDAZOLE 500 MG: 500 TABLET ORAL at 21:17

## 2022-01-01 RX ADMIN — CIPROFLOXACIN HYDROCHLORIDE 500 MG: 500 TABLET, FILM COATED ORAL at 21:08

## 2022-01-01 RX ADMIN — ACETAMINOPHEN 650 MG: 325 TABLET, FILM COATED ORAL at 06:46

## 2022-01-01 RX ADMIN — INSULIN ASPART 1 UNITS: 100 INJECTION, SOLUTION INTRAVENOUS; SUBCUTANEOUS at 11:22

## 2022-01-01 RX ADMIN — METHOCARBAMOL 500 MG: 500 TABLET ORAL at 15:12

## 2022-01-01 RX ADMIN — Medication 1 CAPSULE: at 09:10

## 2022-01-01 RX ADMIN — PHENYLEPHRINE HYDROCHLORIDE 100 MCG: 10 INJECTION INTRAVENOUS at 18:35

## 2022-01-01 RX ADMIN — Medication 1 CAPSULE: at 12:04

## 2022-01-01 RX ADMIN — SODIUM CHLORIDE 500 ML: 9 INJECTION, SOLUTION INTRAVENOUS at 11:00

## 2022-01-01 RX ADMIN — APIXABAN 2.5 MG: 2.5 TABLET, FILM COATED ORAL at 20:16

## 2022-01-01 RX ADMIN — METRONIDAZOLE 500 MG: 500 TABLET ORAL at 20:53

## 2022-01-01 RX ADMIN — ATORVASTATIN CALCIUM 40 MG: 40 TABLET, FILM COATED ORAL at 08:26

## 2022-01-01 RX ADMIN — MICONAZOLE NITRATE: 20 POWDER TOPICAL at 11:37

## 2022-01-01 RX ADMIN — OXYCODONE HYDROCHLORIDE 5 MG: 5 TABLET ORAL at 11:37

## 2022-01-01 RX ADMIN — INSULIN ASPART 1 UNITS: 100 INJECTION, SOLUTION INTRAVENOUS; SUBCUTANEOUS at 08:21

## 2022-01-01 RX ADMIN — ACETAMINOPHEN 650 MG: 325 TABLET, FILM COATED ORAL at 20:17

## 2022-01-01 RX ADMIN — OXYCODONE HYDROCHLORIDE 5 MG: 5 TABLET ORAL at 09:13

## 2022-01-01 RX ADMIN — CIPROFLOXACIN HYDROCHLORIDE 500 MG: 500 TABLET, FILM COATED ORAL at 08:37

## 2022-01-01 RX ADMIN — APIXABAN 5 MG: 5 TABLET, FILM COATED ORAL at 20:18

## 2022-01-01 RX ADMIN — OXYCODONE HYDROCHLORIDE 5 MG: 5 TABLET ORAL at 22:48

## 2022-01-01 RX ADMIN — ATORVASTATIN CALCIUM 40 MG: 40 TABLET, FILM COATED ORAL at 09:03

## 2022-01-01 RX ADMIN — FENTANYL CITRATE 50 MCG: 50 INJECTION, SOLUTION INTRAMUSCULAR; INTRAVENOUS at 12:11

## 2022-01-01 RX ADMIN — METRONIDAZOLE 500 MG: 500 TABLET ORAL at 16:12

## 2022-01-01 RX ADMIN — APIXABAN 2.5 MG: 2.5 TABLET, FILM COATED ORAL at 08:14

## 2022-01-01 RX ADMIN — INSULIN ASPART 3 UNITS: 100 INJECTION, SOLUTION INTRAVENOUS; SUBCUTANEOUS at 14:37

## 2022-01-01 RX ADMIN — Medication 1 CAPSULE: at 08:09

## 2022-01-01 RX ADMIN — HYDROMORPHONE HYDROCHLORIDE 0.2 MG: 0.2 INJECTION, SOLUTION INTRAMUSCULAR; INTRAVENOUS; SUBCUTANEOUS at 21:53

## 2022-01-01 RX ADMIN — APIXABAN 5 MG: 2.5 TABLET, FILM COATED ORAL at 21:24

## 2022-01-01 RX ADMIN — OXYCODONE HYDROCHLORIDE 5 MG: 5 TABLET ORAL at 11:11

## 2022-01-01 RX ADMIN — ATORVASTATIN CALCIUM 40 MG: 40 TABLET, FILM COATED ORAL at 08:29

## 2022-01-01 RX ADMIN — FUROSEMIDE 20 MG: 20 TABLET ORAL at 09:15

## 2022-01-01 RX ADMIN — APIXABAN 5 MG: 5 TABLET, FILM COATED ORAL at 21:26

## 2022-01-01 RX ADMIN — Medication 1 CAPSULE: at 08:06

## 2022-01-01 RX ADMIN — APIXABAN 2.5 MG: 2.5 TABLET, FILM COATED ORAL at 19:31

## 2022-01-01 RX ADMIN — METFORMIN HYDROCHLORIDE 2000 MG: 750 TABLET, EXTENDED RELEASE ORAL at 09:28

## 2022-01-01 RX ADMIN — OXYCODONE HYDROCHLORIDE 10 MG: 10 TABLET ORAL at 00:13

## 2022-01-01 RX ADMIN — SENNOSIDES AND DOCUSATE SODIUM 1 TABLET: 50; 8.6 TABLET ORAL at 07:45

## 2022-01-01 RX ADMIN — ACETAMINOPHEN 650 MG: 325 TABLET, FILM COATED ORAL at 18:38

## 2022-01-01 RX ADMIN — OXYCODONE HYDROCHLORIDE 5 MG: 5 TABLET ORAL at 13:35

## 2022-01-01 RX ADMIN — METRONIDAZOLE 500 MG: 500 TABLET ORAL at 14:14

## 2022-01-01 RX ADMIN — METRONIDAZOLE 500 MG: 500 TABLET ORAL at 09:36

## 2022-01-01 RX ADMIN — APIXABAN 2.5 MG: 2.5 TABLET, FILM COATED ORAL at 21:42

## 2022-01-01 RX ADMIN — PIOGLITAZONE 45 MG: 45 TABLET ORAL at 08:08

## 2022-01-01 RX ADMIN — OXYCODONE HYDROCHLORIDE 10 MG: 10 TABLET ORAL at 21:00

## 2022-01-01 RX ADMIN — APIXABAN 2.5 MG: 2.5 TABLET, FILM COATED ORAL at 08:59

## 2022-01-01 RX ADMIN — ACETAMINOPHEN 975 MG: 325 TABLET, FILM COATED ORAL at 06:30

## 2022-01-01 RX ADMIN — APIXABAN 2.5 MG: 2.5 TABLET, FILM COATED ORAL at 21:00

## 2022-01-01 RX ADMIN — CIPROFLOXACIN HYDROCHLORIDE 500 MG: 500 TABLET, FILM COATED ORAL at 09:13

## 2022-01-01 RX ADMIN — INSULIN ASPART 1 UNITS: 100 INJECTION, SOLUTION INTRAVENOUS; SUBCUTANEOUS at 08:43

## 2022-01-01 RX ADMIN — METFORMIN ER 500 MG 2000 MG: 500 TABLET ORAL at 09:02

## 2022-01-01 RX ADMIN — APIXABAN 2.5 MG: 2.5 TABLET, FILM COATED ORAL at 08:49

## 2022-01-01 RX ADMIN — APIXABAN 5 MG: 2.5 TABLET, FILM COATED ORAL at 21:32

## 2022-01-01 RX ADMIN — METRONIDAZOLE 500 MG: 500 TABLET ORAL at 14:35

## 2022-01-01 RX ADMIN — FUROSEMIDE 20 MG: 20 TABLET ORAL at 08:59

## 2022-01-01 RX ADMIN — INSULIN ASPART 4 UNITS: 100 INJECTION, SOLUTION INTRAVENOUS; SUBCUTANEOUS at 09:59

## 2022-01-01 RX ADMIN — FENTANYL CITRATE 25 MCG: 50 INJECTION, SOLUTION INTRAMUSCULAR; INTRAVENOUS at 21:25

## 2022-01-01 RX ADMIN — Medication 1 CAPSULE: at 09:36

## 2022-01-01 RX ADMIN — SENNOSIDES AND DOCUSATE SODIUM 2 TABLET: 8.6; 5 TABLET ORAL at 09:40

## 2022-01-01 RX ADMIN — Medication 1 CAPSULE: at 09:12

## 2022-01-01 RX ADMIN — APIXABAN 5 MG: 2.5 TABLET, FILM COATED ORAL at 20:51

## 2022-01-01 RX ADMIN — PIPERACILLIN AND TAZOBACTAM 3.38 G: 3; .375 INJECTION, POWDER, LYOPHILIZED, FOR SOLUTION INTRAVENOUS at 15:28

## 2022-01-01 RX ADMIN — ACETAMINOPHEN 975 MG: 325 TABLET, FILM COATED ORAL at 07:14

## 2022-01-01 RX ADMIN — CIPROFLOXACIN HYDROCHLORIDE 500 MG: 500 TABLET, FILM COATED ORAL at 19:51

## 2022-01-01 RX ADMIN — CIPROFLOXACIN HYDROCHLORIDE 500 MG: 500 TABLET, FILM COATED ORAL at 21:16

## 2022-01-01 RX ADMIN — METRONIDAZOLE 500 MG: 500 TABLET ORAL at 14:06

## 2022-01-01 RX ADMIN — INSULIN ASPART 2 UNITS: 100 INJECTION, SOLUTION INTRAVENOUS; SUBCUTANEOUS at 17:36

## 2022-01-01 RX ADMIN — CIPROFLOXACIN HYDROCHLORIDE 500 MG: 500 TABLET, FILM COATED ORAL at 19:52

## 2022-01-01 RX ADMIN — CIPROFLOXACIN 500 MG: 500 TABLET, FILM COATED ORAL at 21:34

## 2022-01-01 RX ADMIN — METRONIDAZOLE 500 MG: 500 TABLET ORAL at 08:06

## 2022-01-01 RX ADMIN — Medication 1 CAPSULE: at 08:37

## 2022-01-01 RX ADMIN — PIPERACILLIN AND TAZOBACTAM 3.38 G: 3; .375 INJECTION, POWDER, LYOPHILIZED, FOR SOLUTION INTRAVENOUS at 20:54

## 2022-01-01 RX ADMIN — METHOCARBAMOL 500 MG: 500 TABLET ORAL at 16:53

## 2022-01-01 RX ADMIN — OXYCODONE HYDROCHLORIDE 5 MG: 5 TABLET ORAL at 06:28

## 2022-01-01 RX ADMIN — ATORVASTATIN CALCIUM 40 MG: 40 TABLET, FILM COATED ORAL at 09:11

## 2022-01-01 RX ADMIN — ATORVASTATIN CALCIUM 40 MG: 40 TABLET, FILM COATED ORAL at 07:45

## 2022-01-01 RX ADMIN — OXYCODONE HYDROCHLORIDE 10 MG: 10 TABLET ORAL at 03:19

## 2022-01-01 RX ADMIN — CIPROFLOXACIN HYDROCHLORIDE 500 MG: 500 TABLET, FILM COATED ORAL at 09:33

## 2022-01-01 RX ADMIN — PIPERACILLIN AND TAZOBACTAM 3.38 G: 3; .375 INJECTION, POWDER, LYOPHILIZED, FOR SOLUTION INTRAVENOUS at 08:33

## 2022-01-01 RX ADMIN — MICONAZOLE NITRATE: 20 POWDER TOPICAL at 08:30

## 2022-01-01 RX ADMIN — ACETAMINOPHEN 975 MG: 325 TABLET, FILM COATED ORAL at 10:03

## 2022-01-01 RX ADMIN — LACTULOSE 20 G: 20 SOLUTION ORAL at 14:11

## 2022-01-01 RX ADMIN — INSULIN ASPART 1 UNITS: 100 INJECTION, SOLUTION INTRAVENOUS; SUBCUTANEOUS at 08:16

## 2022-01-01 RX ADMIN — ACETAMINOPHEN 650 MG: 325 TABLET, FILM COATED ORAL at 09:10

## 2022-01-01 RX ADMIN — SENNOSIDES AND DOCUSATE SODIUM 2 TABLET: 8.6; 5 TABLET ORAL at 10:42

## 2022-01-01 RX ADMIN — FUROSEMIDE 20 MG: 20 TABLET ORAL at 09:44

## 2022-01-01 RX ADMIN — FENTANYL CITRATE 25 MCG: 50 INJECTION, SOLUTION INTRAMUSCULAR; INTRAVENOUS at 21:33

## 2022-01-01 RX ADMIN — PHENYLEPHRINE HYDROCHLORIDE 50 MCG: 10 INJECTION INTRAVENOUS at 17:57

## 2022-01-01 RX ADMIN — OXYCODONE HYDROCHLORIDE 10 MG: 10 TABLET ORAL at 14:35

## 2022-01-01 RX ADMIN — MICONAZOLE NITRATE: 20 POWDER TOPICAL at 08:19

## 2022-01-01 RX ADMIN — CIPROFLOXACIN HYDROCHLORIDE 500 MG: 500 TABLET, FILM COATED ORAL at 08:18

## 2022-01-01 RX ADMIN — APIXABAN 2.5 MG: 2.5 TABLET, FILM COATED ORAL at 09:15

## 2022-01-01 RX ADMIN — SODIUM CHLORIDE, SODIUM LACTATE, POTASSIUM CHLORIDE, CALCIUM CHLORIDE: 600; 310; 30; 20 INJECTION, SOLUTION INTRAVENOUS at 13:50

## 2022-01-01 RX ADMIN — Medication 1 SPRAY: at 22:18

## 2022-01-01 RX ADMIN — METFORMIN HYDROCHLORIDE 2000 MG: 750 TABLET, EXTENDED RELEASE ORAL at 08:37

## 2022-01-01 RX ADMIN — METRONIDAZOLE 500 MG: 500 TABLET ORAL at 14:31

## 2022-01-01 RX ADMIN — POLYETHYLENE GLYCOL 3350 17 G: 17 POWDER, FOR SOLUTION ORAL at 07:45

## 2022-01-01 RX ADMIN — Medication 1 CAPSULE: at 20:13

## 2022-01-01 RX ADMIN — METFORMIN ER 500 MG 2000 MG: 500 TABLET ORAL at 08:28

## 2022-01-01 RX ADMIN — METRONIDAZOLE 500 MG: 500 TABLET ORAL at 14:33

## 2022-01-01 RX ADMIN — APIXABAN 2.5 MG: 2.5 TABLET, FILM COATED ORAL at 09:29

## 2022-01-01 RX ADMIN — Medication 1 CAPSULE: at 08:36

## 2022-01-01 RX ADMIN — ACETAMINOPHEN 650 MG: 325 TABLET, FILM COATED ORAL at 18:33

## 2022-01-01 RX ADMIN — METHOCARBAMOL 500 MG: 500 TABLET ORAL at 16:33

## 2022-01-01 RX ADMIN — PIPERACILLIN AND TAZOBACTAM 3.38 G: 3; .375 INJECTION, POWDER, LYOPHILIZED, FOR SOLUTION INTRAVENOUS at 20:29

## 2022-01-01 RX ADMIN — CIPROFLOXACIN HYDROCHLORIDE 500 MG: 500 TABLET, FILM COATED ORAL at 09:32

## 2022-01-01 RX ADMIN — METRONIDAZOLE 500 MG: 500 TABLET ORAL at 09:00

## 2022-01-01 RX ADMIN — METRONIDAZOLE 500 MG: 500 TABLET ORAL at 20:11

## 2022-01-01 RX ADMIN — INSULIN ASPART 1 UNITS: 100 INJECTION, SOLUTION INTRAVENOUS; SUBCUTANEOUS at 14:31

## 2022-01-01 RX ADMIN — CIPROFLOXACIN 500 MG: 500 TABLET, FILM COATED ORAL at 21:32

## 2022-01-01 RX ADMIN — PIOGLITAZONE 45 MG: 45 TABLET ORAL at 09:33

## 2022-01-01 RX ADMIN — APIXABAN 5 MG: 5 TABLET, FILM COATED ORAL at 20:53

## 2022-01-01 RX ADMIN — METRONIDAZOLE 500 MG: 500 TABLET ORAL at 20:37

## 2022-01-01 RX ADMIN — FUROSEMIDE 20 MG: 20 TABLET ORAL at 09:29

## 2022-01-01 RX ADMIN — PIOGLITAZONE 45 MG: 45 TABLET ORAL at 09:29

## 2022-01-01 RX ADMIN — METRONIDAZOLE 500 MG: 500 TABLET ORAL at 20:05

## 2022-01-01 RX ADMIN — LIRAGLUTIDE 1.8 MG: 6 INJECTION SUBCUTANEOUS at 10:06

## 2022-01-01 RX ADMIN — APIXABAN 5 MG: 2.5 TABLET, FILM COATED ORAL at 08:28

## 2022-01-01 RX ADMIN — OXYCODONE HYDROCHLORIDE 10 MG: 10 TABLET ORAL at 05:04

## 2022-01-01 RX ADMIN — ACETAMINOPHEN 650 MG: 325 TABLET, FILM COATED ORAL at 00:14

## 2022-01-01 RX ADMIN — METRONIDAZOLE 500 MG: 500 TABLET ORAL at 08:48

## 2022-01-01 RX ADMIN — Medication 1 CAPSULE: at 21:24

## 2022-01-01 RX ADMIN — APIXABAN 5 MG: 5 TABLET, FILM COATED ORAL at 21:10

## 2022-01-01 RX ADMIN — CIPROFLOXACIN HYDROCHLORIDE 500 MG: 500 TABLET, FILM COATED ORAL at 08:15

## 2022-01-01 RX ADMIN — Medication 20 MG: at 18:59

## 2022-01-01 RX ADMIN — POLYETHYLENE GLYCOL 3350 17 G: 17 POWDER, FOR SOLUTION ORAL at 10:39

## 2022-01-01 RX ADMIN — APIXABAN 5 MG: 5 TABLET, FILM COATED ORAL at 10:43

## 2022-01-01 RX ADMIN — METFORMIN HYDROCHLORIDE 2000 MG: 750 TABLET, EXTENDED RELEASE ORAL at 08:12

## 2022-01-01 RX ADMIN — CIPROFLOXACIN HYDROCHLORIDE 500 MG: 500 TABLET, FILM COATED ORAL at 20:11

## 2022-01-01 RX ADMIN — OXYCODONE HYDROCHLORIDE 10 MG: 10 TABLET ORAL at 04:26

## 2022-01-01 RX ADMIN — LIRAGLUTIDE 1.8 MG: 6 INJECTION SUBCUTANEOUS at 11:10

## 2022-01-01 RX ADMIN — Medication 1 CAPSULE: at 20:53

## 2022-01-01 RX ADMIN — ACETAMINOPHEN 650 MG: 325 TABLET, FILM COATED ORAL at 09:52

## 2022-01-01 RX ADMIN — HYDROXYZINE HYDROCHLORIDE 10 MG: 10 TABLET ORAL at 13:37

## 2022-01-01 RX ADMIN — Medication 1 CAPSULE: at 22:27

## 2022-01-01 RX ADMIN — APIXABAN 5 MG: 2.5 TABLET, FILM COATED ORAL at 21:34

## 2022-01-01 RX ADMIN — METRONIDAZOLE 500 MG: 500 TABLET ORAL at 11:35

## 2022-01-01 RX ADMIN — OXYCODONE HYDROCHLORIDE 10 MG: 10 TABLET ORAL at 18:00

## 2022-01-01 RX ADMIN — ACETAMINOPHEN 650 MG: 325 TABLET, FILM COATED ORAL at 07:45

## 2022-01-01 RX ADMIN — METFORMIN HYDROCHLORIDE 2000 MG: 750 TABLET, EXTENDED RELEASE ORAL at 08:05

## 2022-01-01 RX ADMIN — OXYCODONE HYDROCHLORIDE 10 MG: 10 TABLET ORAL at 02:13

## 2022-01-01 RX ADMIN — OXYCODONE HYDROCHLORIDE 10 MG: 10 TABLET ORAL at 14:16

## 2022-01-01 RX ADMIN — Medication 1 CAPSULE: at 20:35

## 2022-01-01 RX ADMIN — CIPROFLOXACIN HYDROCHLORIDE 500 MG: 500 TABLET, FILM COATED ORAL at 20:13

## 2022-01-01 RX ADMIN — MICONAZOLE NITRATE: 20 POWDER TOPICAL at 21:34

## 2022-01-01 RX ADMIN — INSULIN ASPART 2 UNITS: 100 INJECTION, SOLUTION INTRAVENOUS; SUBCUTANEOUS at 09:29

## 2022-01-01 RX ADMIN — METFORMIN HYDROCHLORIDE 2000 MG: 750 TABLET, EXTENDED RELEASE ORAL at 08:48

## 2022-01-01 RX ADMIN — APIXABAN 5 MG: 2.5 TABLET, FILM COATED ORAL at 11:11

## 2022-01-01 RX ADMIN — METFORMIN ER 500 MG 2000 MG: 500 TABLET ORAL at 08:59

## 2022-01-01 RX ADMIN — METRONIDAZOLE 500 MG: 500 TABLET ORAL at 20:34

## 2022-01-01 RX ADMIN — OXYCODONE HYDROCHLORIDE 10 MG: 10 TABLET ORAL at 20:17

## 2022-01-01 RX ADMIN — SODIUM CHLORIDE, SODIUM LACTATE, POTASSIUM CHLORIDE, CALCIUM CHLORIDE: 600; 310; 30; 20 INJECTION, SOLUTION INTRAVENOUS at 19:11

## 2022-01-01 RX ADMIN — METRONIDAZOLE 500 MG: 500 TABLET ORAL at 16:39

## 2022-01-01 RX ADMIN — METRONIDAZOLE 500 MG: 500 TABLET ORAL at 20:27

## 2022-01-01 RX ADMIN — ATORVASTATIN CALCIUM 40 MG: 40 TABLET, FILM COATED ORAL at 11:34

## 2022-01-01 RX ADMIN — METRONIDAZOLE 500 MG: 500 TABLET ORAL at 09:33

## 2022-01-01 RX ADMIN — PIOGLITAZONE 45 MG: 45 TABLET ORAL at 08:29

## 2022-01-01 RX ADMIN — CIPROFLOXACIN HYDROCHLORIDE 500 MG: 500 TABLET, FILM COATED ORAL at 12:22

## 2022-01-01 RX ADMIN — Medication 1 CAPSULE: at 23:59

## 2022-01-01 RX ADMIN — METRONIDAZOLE 500 MG: 500 TABLET ORAL at 08:32

## 2022-01-01 RX ADMIN — INSULIN ASPART 1 UNITS: 100 INJECTION, SOLUTION INTRAVENOUS; SUBCUTANEOUS at 18:34

## 2022-01-01 RX ADMIN — OXYCODONE HYDROCHLORIDE 10 MG: 10 TABLET ORAL at 00:11

## 2022-01-01 RX ADMIN — POLYETHYLENE GLYCOL 3350 17 G: 17 POWDER, FOR SOLUTION ORAL at 08:06

## 2022-01-01 RX ADMIN — ACETAMINOPHEN 650 MG: 325 TABLET, FILM COATED ORAL at 13:52

## 2022-01-01 RX ADMIN — APIXABAN 2.5 MG: 2.5 TABLET, FILM COATED ORAL at 08:55

## 2022-01-01 RX ADMIN — CIPROFLOXACIN HYDROCHLORIDE 500 MG: 500 TABLET, FILM COATED ORAL at 22:36

## 2022-01-01 RX ADMIN — PIOGLITAZONE 45 MG: 45 TABLET ORAL at 08:32

## 2022-01-01 RX ADMIN — HYDROMORPHONE HYDROCHLORIDE 0.5 MG: 1 INJECTION, SOLUTION INTRAMUSCULAR; INTRAVENOUS; SUBCUTANEOUS at 14:52

## 2022-01-01 RX ADMIN — Medication 1 CAPSULE: at 08:31

## 2022-01-01 RX ADMIN — OXYCODONE HYDROCHLORIDE 10 MG: 10 TABLET ORAL at 19:56

## 2022-01-01 RX ADMIN — Medication 1 CAPSULE: at 10:43

## 2022-01-01 RX ADMIN — PIPERACILLIN AND TAZOBACTAM 3.38 G: 3; .375 INJECTION, POWDER, LYOPHILIZED, FOR SOLUTION INTRAVENOUS at 13:44

## 2022-01-01 RX ADMIN — METRONIDAZOLE 500 MG: 500 TABLET ORAL at 14:54

## 2022-01-01 RX ADMIN — CIPROFLOXACIN HYDROCHLORIDE 500 MG: 500 TABLET, FILM COATED ORAL at 22:07

## 2022-01-01 RX ADMIN — APIXABAN 2.5 MG: 2.5 TABLET, FILM COATED ORAL at 09:43

## 2022-01-01 RX ADMIN — SODIUM CHLORIDE, SODIUM GLUCONATE, SODIUM ACETATE, POTASSIUM CHLORIDE AND MAGNESIUM CHLORIDE: 526; 502; 368; 37; 30 INJECTION, SOLUTION INTRAVENOUS at 17:50

## 2022-01-01 RX ADMIN — CIPROFLOXACIN HYDROCHLORIDE 500 MG: 500 TABLET, FILM COATED ORAL at 08:47

## 2022-01-01 RX ADMIN — Medication 1 CAPSULE: at 21:34

## 2022-01-01 RX ADMIN — SENNOSIDES AND DOCUSATE SODIUM 2 TABLET: 8.6; 5 TABLET ORAL at 20:34

## 2022-01-01 RX ADMIN — METHOCARBAMOL 500 MG: 500 TABLET ORAL at 00:35

## 2022-01-01 RX ADMIN — CALCIUM CARBONATE (ANTACID) CHEW TAB 500 MG 500 MG: 500 CHEW TAB at 12:44

## 2022-01-01 RX ADMIN — INSULIN ASPART 3 UNITS: 100 INJECTION, SOLUTION INTRAVENOUS; SUBCUTANEOUS at 18:43

## 2022-01-01 RX ADMIN — METHOCARBAMOL 500 MG: 500 TABLET ORAL at 10:24

## 2022-01-01 RX ADMIN — PIPERACILLIN AND TAZOBACTAM 3.38 G: 3; .375 INJECTION, POWDER, LYOPHILIZED, FOR SOLUTION INTRAVENOUS at 07:51

## 2022-01-01 RX ADMIN — METFORMIN HYDROCHLORIDE 2000 MG: 750 TABLET, EXTENDED RELEASE ORAL at 08:08

## 2022-01-01 RX ADMIN — PIPERACILLIN AND TAZOBACTAM 3.38 G: 3; .375 INJECTION, POWDER, LYOPHILIZED, FOR SOLUTION INTRAVENOUS at 03:07

## 2022-01-01 RX ADMIN — PIPERACILLIN AND TAZOBACTAM 3.38 G: 3; .375 INJECTION, POWDER, LYOPHILIZED, FOR SOLUTION INTRAVENOUS at 09:06

## 2022-01-01 RX ADMIN — PIPERACILLIN AND TAZOBACTAM 3.38 G: 3; .375 INJECTION, POWDER, LYOPHILIZED, FOR SOLUTION INTRAVENOUS at 14:55

## 2022-01-01 RX ADMIN — PIPERACILLIN AND TAZOBACTAM 3.38 G: 3; .375 INJECTION, POWDER, LYOPHILIZED, FOR SOLUTION INTRAVENOUS at 13:56

## 2022-01-01 RX ADMIN — MICONAZOLE NITRATE: 20 POWDER TOPICAL at 10:44

## 2022-01-01 RX ADMIN — PHENYLEPHRINE HYDROCHLORIDE 50 MCG: 10 INJECTION INTRAVENOUS at 18:00

## 2022-01-01 RX ADMIN — METHOCARBAMOL 500 MG: 500 TABLET ORAL at 18:00

## 2022-01-01 RX ADMIN — ACETAMINOPHEN 975 MG: 325 TABLET, FILM COATED ORAL at 14:27

## 2022-01-01 RX ADMIN — APIXABAN 5 MG: 5 TABLET, FILM COATED ORAL at 08:37

## 2022-01-01 RX ADMIN — APIXABAN 5 MG: 5 TABLET, FILM COATED ORAL at 20:19

## 2022-01-01 RX ADMIN — HYDROXYZINE HYDROCHLORIDE 10 MG: 10 TABLET ORAL at 13:19

## 2022-01-01 RX ADMIN — Medication 1 CAPSULE: at 23:04

## 2022-01-01 RX ADMIN — INSULIN ASPART 1 UNITS: 100 INJECTION, SOLUTION INTRAVENOUS; SUBCUTANEOUS at 12:24

## 2022-01-01 RX ADMIN — OXYCODONE HYDROCHLORIDE 5 MG: 5 TABLET ORAL at 07:44

## 2022-01-01 RX ADMIN — METFORMIN ER 500 MG 2000 MG: 500 TABLET ORAL at 09:51

## 2022-01-01 RX ADMIN — APIXABAN 2.5 MG: 2.5 TABLET, FILM COATED ORAL at 19:52

## 2022-01-01 RX ADMIN — OXYCODONE HYDROCHLORIDE 10 MG: 10 TABLET ORAL at 14:26

## 2022-01-01 RX ADMIN — ATORVASTATIN CALCIUM 40 MG: 40 TABLET, FILM COATED ORAL at 09:13

## 2022-01-01 RX ADMIN — INSULIN ASPART 2 UNITS: 100 INJECTION, SOLUTION INTRAVENOUS; SUBCUTANEOUS at 17:26

## 2022-01-01 RX ADMIN — HUMAN INSULIN 2 UNITS/HR: 100 INJECTION, SOLUTION SUBCUTANEOUS at 10:29

## 2022-01-01 RX ADMIN — METRONIDAZOLE 500 MG: 500 TABLET ORAL at 08:50

## 2022-01-01 RX ADMIN — APIXABAN 5 MG: 5 TABLET, FILM COATED ORAL at 20:25

## 2022-01-01 RX ADMIN — Medication 1 CAPSULE: at 11:11

## 2022-01-01 RX ADMIN — METFORMIN HYDROCHLORIDE 2000 MG: 750 TABLET, EXTENDED RELEASE ORAL at 08:18

## 2022-01-01 RX ADMIN — APIXABAN 5 MG: 2.5 TABLET, FILM COATED ORAL at 20:26

## 2022-01-01 RX ADMIN — METRONIDAZOLE 500 MG: 500 TABLET ORAL at 20:51

## 2022-01-01 RX ADMIN — INSULIN ASPART 1 UNITS: 100 INJECTION, SOLUTION INTRAVENOUS; SUBCUTANEOUS at 18:51

## 2022-01-01 RX ADMIN — OXYCODONE HYDROCHLORIDE 10 MG: 10 TABLET ORAL at 14:04

## 2022-01-01 RX ADMIN — OXYCODONE HYDROCHLORIDE 10 MG: 10 TABLET ORAL at 10:11

## 2022-01-01 RX ADMIN — OXYCODONE HYDROCHLORIDE 10 MG: 10 TABLET ORAL at 06:10

## 2022-01-01 RX ADMIN — PIOGLITAZONE 45 MG: 45 TABLET ORAL at 08:12

## 2022-01-01 RX ADMIN — CIPROFLOXACIN 500 MG: 500 TABLET, FILM COATED ORAL at 11:10

## 2022-01-01 RX ADMIN — OXYCODONE HYDROCHLORIDE 10 MG: 10 TABLET ORAL at 18:33

## 2022-01-01 RX ADMIN — Medication 1 CAPSULE: at 08:18

## 2022-01-01 RX ADMIN — Medication 1 CAPSULE: at 09:33

## 2022-01-01 RX ADMIN — CALCIUM CARBONATE (ANTACID) CHEW TAB 500 MG 500 MG: 500 CHEW TAB at 11:32

## 2022-01-01 RX ADMIN — Medication 1 CAPSULE: at 20:33

## 2022-01-01 RX ADMIN — METRONIDAZOLE 500 MG: 500 TABLET ORAL at 21:08

## 2022-01-01 RX ADMIN — APIXABAN 5 MG: 2.5 TABLET, FILM COATED ORAL at 11:34

## 2022-01-01 RX ADMIN — ACETAMINOPHEN 650 MG: 325 TABLET, FILM COATED ORAL at 09:41

## 2022-01-01 RX ADMIN — SENNOSIDES AND DOCUSATE SODIUM 2 TABLET: 8.6; 5 TABLET ORAL at 20:13

## 2022-01-01 RX ADMIN — ATORVASTATIN CALCIUM 40 MG: 40 TABLET, FILM COATED ORAL at 16:12

## 2022-01-01 RX ADMIN — CIPROFLOXACIN 500 MG: 500 TABLET, FILM COATED ORAL at 09:53

## 2022-01-01 RX ADMIN — PIOGLITAZONE 45 MG: 45 TABLET ORAL at 08:18

## 2022-01-01 RX ADMIN — METRONIDAZOLE 500 MG: 500 TABLET ORAL at 14:26

## 2022-01-01 RX ADMIN — ACETAMINOPHEN 650 MG: 325 TABLET, FILM COATED ORAL at 04:59

## 2022-01-01 RX ADMIN — APIXABAN 5 MG: 2.5 TABLET, FILM COATED ORAL at 20:06

## 2022-01-01 RX ADMIN — PIPERACILLIN AND TAZOBACTAM 3.38 G: 3; .375 INJECTION, POWDER, LYOPHILIZED, FOR SOLUTION INTRAVENOUS at 02:16

## 2022-01-01 RX ADMIN — ACETAMINOPHEN 650 MG: 325 TABLET, FILM COATED ORAL at 03:19

## 2022-01-01 RX ADMIN — Medication 1 CAPSULE: at 08:10

## 2022-01-01 RX ADMIN — Medication 1 CAPSULE: at 20:11

## 2022-01-01 RX ADMIN — METRONIDAZOLE 500 MG: 500 TABLET ORAL at 09:51

## 2022-01-01 RX ADMIN — APIXABAN 2.5 MG: 2.5 TABLET, FILM COATED ORAL at 20:33

## 2022-01-01 RX ADMIN — METRONIDAZOLE 500 MG: 500 TABLET ORAL at 08:08

## 2022-01-01 RX ADMIN — SENNOSIDES AND DOCUSATE SODIUM 2 TABLET: 8.6; 5 TABLET ORAL at 08:30

## 2022-01-01 RX ADMIN — LIDOCAINE HYDROCHLORIDE 100 MG: 20 INJECTION, SOLUTION INFILTRATION; PERINEURAL at 12:21

## 2022-01-01 RX ADMIN — PHENYLEPHRINE HYDROCHLORIDE 50 MCG: 10 INJECTION INTRAVENOUS at 13:27

## 2022-01-01 RX ADMIN — METFORMIN HYDROCHLORIDE 2000 MG: 750 TABLET, EXTENDED RELEASE ORAL at 08:49

## 2022-01-01 RX ADMIN — ACETAMINOPHEN 650 MG: 325 TABLET, FILM COATED ORAL at 00:56

## 2022-01-01 RX ADMIN — ACETAMINOPHEN 650 MG: 325 TABLET, FILM COATED ORAL at 13:19

## 2022-01-01 RX ADMIN — CALCIUM CARBONATE (ANTACID) CHEW TAB 500 MG 500 MG: 500 CHEW TAB at 10:44

## 2022-01-01 RX ADMIN — PIOGLITAZONE 45 MG: 45 TABLET ORAL at 08:47

## 2022-01-01 RX ADMIN — FUROSEMIDE 20 MG: 20 TABLET ORAL at 13:42

## 2022-01-01 RX ADMIN — FUROSEMIDE 20 MG: 20 TABLET ORAL at 09:52

## 2022-01-01 RX ADMIN — METRONIDAZOLE 500 MG: 500 TABLET ORAL at 14:07

## 2022-01-01 RX ADMIN — OXYCODONE HYDROCHLORIDE 5 MG: 5 TABLET ORAL at 12:54

## 2022-01-01 RX ADMIN — METFORMIN HYDROCHLORIDE 2000 MG: 750 TABLET, EXTENDED RELEASE ORAL at 10:30

## 2022-01-01 RX ADMIN — ATORVASTATIN CALCIUM 40 MG: 40 TABLET, FILM COATED ORAL at 08:11

## 2022-01-01 RX ADMIN — APIXABAN 5 MG: 2.5 TABLET, FILM COATED ORAL at 09:52

## 2022-01-01 RX ADMIN — ATORVASTATIN CALCIUM 40 MG: 40 TABLET, FILM COATED ORAL at 09:42

## 2022-01-01 RX ADMIN — METRONIDAZOLE 500 MG: 500 TABLET ORAL at 20:25

## 2022-01-01 RX ADMIN — ASPIRIN 162 MG: 81 TABLET ORAL at 07:44

## 2022-01-01 RX ADMIN — SODIUM CHLORIDE, POTASSIUM CHLORIDE, SODIUM LACTATE AND CALCIUM CHLORIDE: 600; 310; 30; 20 INJECTION, SOLUTION INTRAVENOUS at 00:51

## 2022-01-01 RX ADMIN — Medication 3 G: at 14:36

## 2022-01-01 RX ADMIN — OXYCODONE HYDROCHLORIDE 5 MG: 5 TABLET ORAL at 09:52

## 2022-01-01 RX ADMIN — ONDANSETRON 4 MG: 2 INJECTION INTRAMUSCULAR; INTRAVENOUS at 14:52

## 2022-01-01 RX ADMIN — INSULIN ASPART 1 UNITS: 100 INJECTION, SOLUTION INTRAVENOUS; SUBCUTANEOUS at 08:00

## 2022-01-01 RX ADMIN — OXYCODONE HYDROCHLORIDE 10 MG: 10 TABLET ORAL at 03:36

## 2022-01-01 RX ADMIN — METRONIDAZOLE 500 MG: 500 TABLET ORAL at 14:41

## 2022-01-01 RX ADMIN — ATORVASTATIN CALCIUM 40 MG: 40 TABLET, FILM COATED ORAL at 10:05

## 2022-01-01 RX ADMIN — APIXABAN 5 MG: 5 TABLET, FILM COATED ORAL at 09:12

## 2022-01-01 RX ADMIN — APIXABAN 5 MG: 2.5 TABLET, FILM COATED ORAL at 09:02

## 2022-01-01 RX ADMIN — ATORVASTATIN CALCIUM 40 MG: 40 TABLET, FILM COATED ORAL at 09:33

## 2022-01-01 RX ADMIN — SODIUM CHLORIDE: 0.9 INJECTION, SOLUTION INTRAVENOUS at 19:30

## 2022-01-01 RX ADMIN — INSULIN ASPART 3 UNITS: 100 INJECTION, SOLUTION INTRAVENOUS; SUBCUTANEOUS at 09:11

## 2022-01-01 RX ADMIN — Medication 1 CAPSULE: at 21:00

## 2022-01-01 RX ADMIN — OXYCODONE HYDROCHLORIDE 5 MG: 5 TABLET ORAL at 02:08

## 2022-01-01 RX ADMIN — CIPROFLOXACIN HYDROCHLORIDE 500 MG: 500 TABLET, FILM COATED ORAL at 20:24

## 2022-01-01 RX ADMIN — OXYCODONE HYDROCHLORIDE 10 MG: 10 TABLET ORAL at 13:15

## 2022-01-01 RX ADMIN — Medication 1 CAPSULE: at 21:08

## 2022-01-01 RX ADMIN — PIOGLITAZONE 45 MG: 45 TABLET ORAL at 09:32

## 2022-01-01 RX ADMIN — Medication 20 MG: at 17:54

## 2022-01-01 RX ADMIN — Medication 20 MG: at 17:06

## 2022-01-01 RX ADMIN — METRONIDAZOLE 500 MG: 500 TABLET ORAL at 09:02

## 2022-01-01 RX ADMIN — METRONIDAZOLE 500 MG: 500 TABLET ORAL at 09:43

## 2022-01-01 RX ADMIN — METRONIDAZOLE 500 MG: 500 TABLET ORAL at 08:36

## 2022-01-01 RX ADMIN — HYDROXYZINE HYDROCHLORIDE 10 MG: 10 TABLET ORAL at 14:35

## 2022-01-01 RX ADMIN — METRONIDAZOLE 500 MG: 500 TABLET ORAL at 21:24

## 2022-01-01 RX ADMIN — CIPROFLOXACIN 500 MG: 500 TABLET, FILM COATED ORAL at 10:13

## 2022-01-01 RX ADMIN — METHOCARBAMOL 500 MG: 500 TABLET ORAL at 18:33

## 2022-01-01 RX ADMIN — METRONIDAZOLE 500 MG: 500 TABLET ORAL at 21:16

## 2022-01-01 RX ADMIN — SUGAMMADEX 400 MG: 100 INJECTION, SOLUTION INTRAVENOUS at 20:45

## 2022-01-01 RX ADMIN — PROPOFOL 20 MG: 10 INJECTION, EMULSION INTRAVENOUS at 12:22

## 2022-01-01 RX ADMIN — PIOGLITAZONE 45 MG: 45 TABLET ORAL at 08:36

## 2022-01-01 RX ADMIN — Medication 3 G: at 18:30

## 2022-01-01 RX ADMIN — PIPERACILLIN AND TAZOBACTAM 3.38 G: 3; .375 INJECTION, POWDER, LYOPHILIZED, FOR SOLUTION INTRAVENOUS at 02:22

## 2022-01-01 RX ADMIN — OXYCODONE HYDROCHLORIDE 10 MG: 10 TABLET ORAL at 09:27

## 2022-01-01 RX ADMIN — APIXABAN 2.5 MG: 2.5 TABLET, FILM COATED ORAL at 20:04

## 2022-01-01 RX ADMIN — ATORVASTATIN CALCIUM 40 MG: 40 TABLET, FILM COATED ORAL at 08:55

## 2022-01-01 RX ADMIN — PIPERACILLIN AND TAZOBACTAM 3.38 G: 3; .375 INJECTION, POWDER, LYOPHILIZED, FOR SOLUTION INTRAVENOUS at 03:11

## 2022-01-01 RX ADMIN — SENNOSIDES AND DOCUSATE SODIUM 1 TABLET: 50; 8.6 TABLET ORAL at 08:06

## 2022-01-01 RX ADMIN — ACETAMINOPHEN 650 MG: 325 TABLET, FILM COATED ORAL at 00:50

## 2022-01-01 RX ADMIN — APIXABAN 5 MG: 5 TABLET, FILM COATED ORAL at 08:32

## 2022-01-01 RX ADMIN — FENTANYL CITRATE 25 MCG: 50 INJECTION, SOLUTION INTRAMUSCULAR; INTRAVENOUS at 21:13

## 2022-01-01 RX ADMIN — CIPROFLOXACIN HYDROCHLORIDE 500 MG: 500 TABLET, FILM COATED ORAL at 20:34

## 2022-01-01 RX ADMIN — OXYCODONE HYDROCHLORIDE 5 MG: 5 TABLET ORAL at 13:19

## 2022-01-01 RX ADMIN — METRONIDAZOLE 500 MG: 500 TABLET ORAL at 20:23

## 2022-01-01 RX ADMIN — PIPERACILLIN AND TAZOBACTAM 3.38 G: 3; .375 INJECTION, POWDER, LYOPHILIZED, FOR SOLUTION INTRAVENOUS at 21:57

## 2022-01-01 RX ADMIN — SENNOSIDES AND DOCUSATE SODIUM 1 TABLET: 50; 8.6 TABLET ORAL at 20:09

## 2022-01-01 RX ADMIN — MICONAZOLE NITRATE: 20 POWDER TOPICAL at 20:53

## 2022-01-01 RX ADMIN — METRONIDAZOLE 500 MG: 500 TABLET ORAL at 08:44

## 2022-01-01 RX ADMIN — OXYCODONE HYDROCHLORIDE 10 MG: 10 TABLET ORAL at 20:08

## 2022-01-01 RX ADMIN — PIPERACILLIN AND TAZOBACTAM 3.38 G: 3; .375 INJECTION, POWDER, LYOPHILIZED, FOR SOLUTION INTRAVENOUS at 14:30

## 2022-01-01 RX ADMIN — APIXABAN 5 MG: 5 TABLET, FILM COATED ORAL at 08:50

## 2022-01-01 RX ADMIN — Medication 1 SPRAY: at 12:07

## 2022-01-01 RX ADMIN — ATORVASTATIN CALCIUM 40 MG: 40 TABLET, FILM COATED ORAL at 09:35

## 2022-01-01 RX ADMIN — ACETAMINOPHEN 1000 MG: 325 TABLET ORAL at 14:35

## 2022-01-01 RX ADMIN — ATORVASTATIN CALCIUM 40 MG: 40 TABLET, FILM COATED ORAL at 08:50

## 2022-01-01 RX ADMIN — HYDROMORPHONE HYDROCHLORIDE 0.4 MG: 0.2 INJECTION, SOLUTION INTRAMUSCULAR; INTRAVENOUS; SUBCUTANEOUS at 14:11

## 2022-01-01 RX ADMIN — ACETAMINOPHEN 650 MG: 325 TABLET, FILM COATED ORAL at 08:28

## 2022-01-01 RX ADMIN — ALBUMIN HUMAN: 0.05 INJECTION, SOLUTION INTRAVENOUS at 17:29

## 2022-01-01 RX ADMIN — PIPERACILLIN AND TAZOBACTAM 3.38 G: 3; .375 INJECTION, POWDER, LYOPHILIZED, FOR SOLUTION INTRAVENOUS at 09:45

## 2022-01-01 RX ADMIN — METRONIDAZOLE 500 MG: 500 TABLET ORAL at 14:21

## 2022-01-01 RX ADMIN — HYDROXYZINE HYDROCHLORIDE 10 MG: 10 TABLET ORAL at 19:01

## 2022-01-01 RX ADMIN — METRONIDAZOLE 500 MG: 500 TABLET ORAL at 21:10

## 2022-01-01 RX ADMIN — PIPERACILLIN AND TAZOBACTAM 3.38 G: 3; .375 INJECTION, POWDER, LYOPHILIZED, FOR SOLUTION INTRAVENOUS at 15:29

## 2022-01-01 RX ADMIN — FUROSEMIDE 20 MG: 20 TABLET ORAL at 08:15

## 2022-01-01 RX ADMIN — OXYCODONE HYDROCHLORIDE 10 MG: 10 TABLET ORAL at 06:01

## 2022-01-01 RX ADMIN — MICONAZOLE NITRATE: 20 POWDER TOPICAL at 20:27

## 2022-01-01 RX ADMIN — INSULIN ASPART 4 UNITS: 100 INJECTION, SOLUTION INTRAVENOUS; SUBCUTANEOUS at 15:00

## 2022-01-01 RX ADMIN — INSULIN ASPART 1 UNITS: 100 INJECTION, SOLUTION INTRAVENOUS; SUBCUTANEOUS at 20:24

## 2022-01-01 RX ADMIN — METFORMIN HYDROCHLORIDE 2000 MG: 750 TABLET, EXTENDED RELEASE ORAL at 08:29

## 2022-01-01 RX ADMIN — METRONIDAZOLE 500 MG: 500 TABLET ORAL at 13:54

## 2022-01-01 RX ADMIN — METRONIDAZOLE 500 MG: 500 TABLET ORAL at 15:20

## 2022-01-01 RX ADMIN — FENTANYL CITRATE 50 MCG: 50 INJECTION, SOLUTION INTRAMUSCULAR; INTRAVENOUS at 14:15

## 2022-01-01 RX ADMIN — HYDROMORPHONE HYDROCHLORIDE 0.5 MG: 1 INJECTION, SOLUTION INTRAMUSCULAR; INTRAVENOUS; SUBCUTANEOUS at 19:36

## 2022-01-01 RX ADMIN — Medication 20 MG: at 15:33

## 2022-01-01 RX ADMIN — CALCIUM CARBONATE (ANTACID) CHEW TAB 500 MG 500 MG: 500 CHEW TAB at 16:09

## 2022-01-01 RX ADMIN — PSYLLIUM HUSK 1 PACKET: 3.4 POWDER ORAL at 03:19

## 2022-01-01 RX ADMIN — METFORMIN HYDROCHLORIDE 2000 MG: 750 TABLET, EXTENDED RELEASE ORAL at 09:10

## 2022-01-01 RX ADMIN — ACETAMINOPHEN 975 MG: 325 TABLET, FILM COATED ORAL at 22:35

## 2022-01-01 RX ADMIN — APIXABAN 5 MG: 2.5 TABLET, FILM COATED ORAL at 10:05

## 2022-01-01 RX ADMIN — OXYCODONE HYDROCHLORIDE 10 MG: 10 TABLET ORAL at 09:53

## 2022-01-01 RX ADMIN — TRANEXAMIC ACID 1950 MG: 650 TABLET ORAL at 10:03

## 2022-01-01 RX ADMIN — Medication 1 CAPSULE: at 19:31

## 2022-01-01 RX ADMIN — CIPROFLOXACIN HYDROCHLORIDE 500 MG: 500 TABLET, FILM COATED ORAL at 20:20

## 2022-01-01 RX ADMIN — ATORVASTATIN CALCIUM 40 MG: 40 TABLET, FILM COATED ORAL at 09:51

## 2022-01-01 RX ADMIN — APIXABAN 5 MG: 5 TABLET, FILM COATED ORAL at 08:05

## 2022-01-01 RX ADMIN — MIDAZOLAM 2 MG: 1 INJECTION INTRAMUSCULAR; INTRAVENOUS at 12:07

## 2022-01-01 RX ADMIN — METRONIDAZOLE 500 MG: 500 TABLET ORAL at 08:18

## 2022-01-01 RX ADMIN — ACETAMINOPHEN 975 MG: 325 TABLET, FILM COATED ORAL at 13:44

## 2022-01-01 RX ADMIN — ACETAMINOPHEN 650 MG: 325 TABLET, FILM COATED ORAL at 08:03

## 2022-01-01 RX ADMIN — Medication 1 CAPSULE: at 13:00

## 2022-01-01 RX ADMIN — METFORMIN HYDROCHLORIDE 2000 MG: 750 TABLET, EXTENDED RELEASE ORAL at 08:09

## 2022-01-01 RX ADMIN — CIPROFLOXACIN HYDROCHLORIDE 500 MG: 500 TABLET, FILM COATED ORAL at 08:33

## 2022-01-01 RX ADMIN — MICONAZOLE NITRATE: 20 POWDER TOPICAL at 08:13

## 2022-01-01 RX ADMIN — OXYCODONE HYDROCHLORIDE 10 MG: 10 TABLET ORAL at 12:34

## 2022-01-01 RX ADMIN — APIXABAN 5 MG: 2.5 TABLET, FILM COATED ORAL at 09:00

## 2022-01-01 RX ADMIN — SODIUM CHLORIDE, POTASSIUM CHLORIDE, SODIUM LACTATE AND CALCIUM CHLORIDE: 600; 310; 30; 20 INJECTION, SOLUTION INTRAVENOUS at 15:00

## 2022-01-01 RX ADMIN — CIPROFLOXACIN 500 MG: 500 TABLET, FILM COATED ORAL at 21:24

## 2022-01-01 RX ADMIN — ONDANSETRON 4 MG: 4 TABLET, ORALLY DISINTEGRATING ORAL at 22:32

## 2022-01-01 RX ADMIN — CIPROFLOXACIN HYDROCHLORIDE 500 MG: 500 TABLET, FILM COATED ORAL at 20:30

## 2022-01-01 RX ADMIN — METFORMIN HYDROCHLORIDE 2000 MG: 750 TABLET, EXTENDED RELEASE ORAL at 10:41

## 2022-01-01 RX ADMIN — FUROSEMIDE 20 MG: 20 TABLET ORAL at 11:33

## 2022-01-01 RX ADMIN — OXYCODONE HYDROCHLORIDE 10 MG: 10 TABLET ORAL at 12:01

## 2022-01-01 RX ADMIN — OXYCODONE HYDROCHLORIDE 10 MG: 10 TABLET ORAL at 20:34

## 2022-01-01 RX ADMIN — OXYCODONE HYDROCHLORIDE 10 MG: 10 TABLET ORAL at 09:42

## 2022-01-01 RX ADMIN — PIOGLITAZONE 45 MG: 45 TABLET ORAL at 09:40

## 2022-01-01 RX ADMIN — ALBUMIN HUMAN: 0.05 INJECTION, SOLUTION INTRAVENOUS at 15:30

## 2022-01-01 RX ADMIN — PIOGLITAZONE 45 MG: 45 TABLET ORAL at 08:37

## 2022-01-01 RX ADMIN — CIPROFLOXACIN HYDROCHLORIDE 500 MG: 500 TABLET, FILM COATED ORAL at 21:09

## 2022-01-01 RX ADMIN — ACETAMINOPHEN 650 MG: 325 TABLET, FILM COATED ORAL at 20:29

## 2022-01-01 RX ADMIN — ATORVASTATIN CALCIUM 40 MG: 40 TABLET, FILM COATED ORAL at 08:43

## 2022-01-01 RX ADMIN — METRONIDAZOLE 500 MG: 500 TABLET ORAL at 19:51

## 2022-01-01 RX ADMIN — PIOGLITAZONE 45 MG: 45 TABLET ORAL at 08:05

## 2022-01-01 RX ADMIN — Medication 1 CAPSULE: at 08:32

## 2022-01-01 RX ADMIN — PIPERACILLIN AND TAZOBACTAM 3.38 G: 3; .375 INJECTION, POWDER, LYOPHILIZED, FOR SOLUTION INTRAVENOUS at 04:37

## 2022-01-01 RX ADMIN — ACETAMINOPHEN 975 MG: 325 TABLET, FILM COATED ORAL at 22:22

## 2022-01-01 RX ADMIN — INSULIN ASPART 2 UNITS: 100 INJECTION, SOLUTION INTRAVENOUS; SUBCUTANEOUS at 12:09

## 2022-01-01 RX ADMIN — Medication 1 CAPSULE: at 08:29

## 2022-01-01 RX ADMIN — APIXABAN 5 MG: 2.5 TABLET, FILM COATED ORAL at 21:18

## 2022-01-01 RX ADMIN — METRONIDAZOLE 500 MG: 500 TABLET ORAL at 20:35

## 2022-01-01 RX ADMIN — OXYCODONE HYDROCHLORIDE 10 MG: 10 TABLET ORAL at 14:42

## 2022-01-01 RX ADMIN — FUROSEMIDE 20 MG: 20 TABLET ORAL at 08:42

## 2022-01-01 RX ADMIN — POLYETHYLENE GLYCOL 3350 17 G: 17 POWDER, FOR SOLUTION ORAL at 08:26

## 2022-01-01 RX ADMIN — SODIUM CHLORIDE, POTASSIUM CHLORIDE, SODIUM LACTATE AND CALCIUM CHLORIDE: 600; 310; 30; 20 INJECTION, SOLUTION INTRAVENOUS at 10:35

## 2022-01-01 RX ADMIN — CIPROFLOXACIN HYDROCHLORIDE 500 MG: 500 TABLET, FILM COATED ORAL at 19:31

## 2022-01-01 RX ADMIN — OXYCODONE HYDROCHLORIDE 5 MG: 5 TABLET ORAL at 13:44

## 2022-01-01 RX ADMIN — METRONIDAZOLE 500 MG: 500 TABLET ORAL at 09:28

## 2022-01-01 RX ADMIN — ACETAMINOPHEN 975 MG: 325 TABLET, FILM COATED ORAL at 07:04

## 2022-01-01 RX ADMIN — SODIUM CHLORIDE: 9 INJECTION, SOLUTION INTRAVENOUS at 12:45

## 2022-01-01 RX ADMIN — PIOGLITAZONE 45 MG: 45 TABLET ORAL at 09:11

## 2022-01-01 RX ADMIN — PIOGLITAZONE 45 MG: 45 TABLET ORAL at 10:47

## 2022-01-01 RX ADMIN — Medication 1 SPRAY: at 13:06

## 2022-01-01 RX ADMIN — MICONAZOLE NITRATE: 20 POWDER TOPICAL at 20:36

## 2022-01-01 RX ADMIN — ACETAMINOPHEN 650 MG: 325 TABLET, FILM COATED ORAL at 03:22

## 2022-01-01 RX ADMIN — PIPERACILLIN AND TAZOBACTAM 3.38 G: 3; .375 INJECTION, POWDER, LYOPHILIZED, FOR SOLUTION INTRAVENOUS at 03:24

## 2022-01-01 RX ADMIN — VANCOMYCIN HYDROCHLORIDE 1500 MG: 10 INJECTION, POWDER, LYOPHILIZED, FOR SOLUTION INTRAVENOUS at 05:29

## 2022-01-01 RX ADMIN — CIPROFLOXACIN HYDROCHLORIDE 500 MG: 500 TABLET, FILM COATED ORAL at 08:50

## 2022-01-01 RX ADMIN — ACETAMINOPHEN 650 MG: 325 TABLET, FILM COATED ORAL at 05:04

## 2022-01-01 RX ADMIN — Medication 1 CAPSULE: at 21:33

## 2022-01-01 RX ADMIN — Medication 1 CAPSULE: at 21:10

## 2022-01-01 RX ADMIN — ATORVASTATIN CALCIUM 40 MG: 40 TABLET, FILM COATED ORAL at 08:35

## 2022-01-01 RX ADMIN — ATORVASTATIN CALCIUM 40 MG: 40 TABLET, FILM COATED ORAL at 08:05

## 2022-01-01 RX ADMIN — METRONIDAZOLE 500 MG: 500 TABLET ORAL at 19:56

## 2022-01-01 RX ADMIN — PIPERACILLIN AND TAZOBACTAM 3.38 G: 3; .375 INJECTION, POWDER, LYOPHILIZED, FOR SOLUTION INTRAVENOUS at 16:31

## 2022-01-01 RX ADMIN — METRONIDAZOLE 500 MG: 500 TABLET ORAL at 13:15

## 2022-01-01 RX ADMIN — METFORMIN HYDROCHLORIDE 2000 MG: 750 TABLET, EXTENDED RELEASE ORAL at 10:48

## 2022-01-01 RX ADMIN — PIPERACILLIN AND TAZOBACTAM 3.38 G: 3; .375 INJECTION, POWDER, LYOPHILIZED, FOR SOLUTION INTRAVENOUS at 20:42

## 2022-01-01 RX ADMIN — APIXABAN 2.5 MG: 2.5 TABLET, FILM COATED ORAL at 08:11

## 2022-01-01 RX ADMIN — ONDANSETRON 4 MG: 4 TABLET, ORALLY DISINTEGRATING ORAL at 12:44

## 2022-01-01 RX ADMIN — HYDROXYZINE HYDROCHLORIDE 10 MG: 10 TABLET ORAL at 14:42

## 2022-01-01 RX ADMIN — OXYCODONE HYDROCHLORIDE 10 MG: 10 TABLET ORAL at 03:22

## 2022-01-01 RX ADMIN — METRONIDAZOLE 500 MG: 500 TABLET ORAL at 13:03

## 2022-01-01 ASSESSMENT — ACTIVITIES OF DAILY LIVING (ADL)
ADLS_ACUITY_SCORE: 42
ADLS_ACUITY_SCORE: 46
ADLS_ACUITY_SCORE: 45
ADLS_ACUITY_SCORE: 47
ADLS_ACUITY_SCORE: 48
ADLS_ACUITY_SCORE: 45
ADLS_ACUITY_SCORE: 42
ADLS_ACUITY_SCORE: 45
ADLS_ACUITY_SCORE: 50
ADLS_ACUITY_SCORE: 47
ADLS_ACUITY_SCORE: 45
ADLS_ACUITY_SCORE: 48
ADLS_ACUITY_SCORE: 45
ADLS_ACUITY_SCORE: 48
ADLS_ACUITY_SCORE: 47
ADLS_ACUITY_SCORE: 38
ADLS_ACUITY_SCORE: 47
ADLS_ACUITY_SCORE: 47
ADLS_ACUITY_SCORE: 42
ADLS_ACUITY_SCORE: 38
ADLS_ACUITY_SCORE: 52
ADLS_ACUITY_SCORE: 48
ADLS_ACUITY_SCORE: 38
ADLS_ACUITY_SCORE: 48
ADLS_ACUITY_SCORE: 43
ADLS_ACUITY_SCORE: 48
ADLS_ACUITY_SCORE: 43
ADLS_ACUITY_SCORE: 46
ADLS_ACUITY_SCORE: 40
ADLS_ACUITY_SCORE: 42
ADLS_ACUITY_SCORE: 52
ADLS_ACUITY_SCORE: 38
ADLS_ACUITY_SCORE: 42
ADLS_ACUITY_SCORE: 42
ADLS_ACUITY_SCORE: 36
ADLS_ACUITY_SCORE: 38
ADLS_ACUITY_SCORE: 52
ADLS_ACUITY_SCORE: 36
ADLS_ACUITY_SCORE: 47
ADLS_ACUITY_SCORE: 42
ADLS_ACUITY_SCORE: 46
ADLS_ACUITY_SCORE: 46
ADLS_ACUITY_SCORE: 48
ADLS_ACUITY_SCORE: 48
ADLS_ACUITY_SCORE: 38
ADLS_ACUITY_SCORE: 50
ADLS_ACUITY_SCORE: 48
ADLS_ACUITY_SCORE: 38
ADLS_ACUITY_SCORE: 47
ADLS_ACUITY_SCORE: 50
ADLS_ACUITY_SCORE: 38
ADLS_ACUITY_SCORE: 50
ADLS_ACUITY_SCORE: 36
ADLS_ACUITY_SCORE: 48
ADLS_ACUITY_SCORE: 38
ADLS_ACUITY_SCORE: 30
ADLS_ACUITY_SCORE: 46
ADLS_ACUITY_SCORE: 43
ADLS_ACUITY_SCORE: 46
ADLS_ACUITY_SCORE: 36
ADLS_ACUITY_SCORE: 46
ADLS_ACUITY_SCORE: 50
ADLS_ACUITY_SCORE: 42
ADLS_ACUITY_SCORE: 52
ADLS_ACUITY_SCORE: 45
ADLS_ACUITY_SCORE: 45
ADLS_ACUITY_SCORE: 42
ADLS_ACUITY_SCORE: 52
ADLS_ACUITY_SCORE: 52
ADLS_ACUITY_SCORE: 48
ADLS_ACUITY_SCORE: 48
ADLS_ACUITY_SCORE: 46
ADLS_ACUITY_SCORE: 52
ADLS_ACUITY_SCORE: 46
ADLS_ACUITY_SCORE: 52
ADLS_ACUITY_SCORE: 48
ADLS_ACUITY_SCORE: 42
DOING_ERRANDS_INDEPENDENTLY_DIFFICULTY: NO
ADLS_ACUITY_SCORE: 52
ADLS_ACUITY_SCORE: 52
ADLS_ACUITY_SCORE: 46
ADLS_ACUITY_SCORE: 46
ADLS_ACUITY_SCORE: 47
ADLS_ACUITY_SCORE: 45
ADLS_ACUITY_SCORE: 43
ADLS_ACUITY_SCORE: 50
ADLS_ACUITY_SCORE: 42
ADLS_ACUITY_SCORE: 43
ADLS_ACUITY_SCORE: 46
ADLS_ACUITY_SCORE: 47
ADLS_ACUITY_SCORE: 46
ADLS_ACUITY_SCORE: 42
ADLS_ACUITY_SCORE: 46
ADLS_ACUITY_SCORE: 46
ADLS_ACUITY_SCORE: 50
ADLS_ACUITY_SCORE: 43
ADLS_ACUITY_SCORE: 47
ADLS_ACUITY_SCORE: 46
ADLS_ACUITY_SCORE: 38
ADLS_ACUITY_SCORE: 48
ADLS_ACUITY_SCORE: 36
ADLS_ACUITY_SCORE: 46
ADLS_ACUITY_SCORE: 42
ADLS_ACUITY_SCORE: 43
ADLS_ACUITY_SCORE: 42
ADLS_ACUITY_SCORE: 46
ADLS_ACUITY_SCORE: 37
ADLS_ACUITY_SCORE: 46
ADLS_ACUITY_SCORE: 38
ADLS_ACUITY_SCORE: 46
ADLS_ACUITY_SCORE: 38
ADLS_ACUITY_SCORE: 44
ADLS_ACUITY_SCORE: 52
ADLS_ACUITY_SCORE: 48
ADLS_ACUITY_SCORE: 48
ADLS_ACUITY_SCORE: 42
ADLS_ACUITY_SCORE: 52
ADLS_ACUITY_SCORE: 46
ADLS_ACUITY_SCORE: 52
ADLS_ACUITY_SCORE: 43
ADLS_ACUITY_SCORE: 46
ADLS_ACUITY_SCORE: 52
ADLS_ACUITY_SCORE: 46
ADLS_ACUITY_SCORE: 46
ADLS_ACUITY_SCORE: 45
ADLS_ACUITY_SCORE: 52
ADLS_ACUITY_SCORE: 46
ADLS_ACUITY_SCORE: 45
ADLS_ACUITY_SCORE: 46
ADLS_ACUITY_SCORE: 43
ADLS_ACUITY_SCORE: 30
ADLS_ACUITY_SCORE: 47
CONCENTRATING,_REMEMBERING_OR_MAKING_DECISIONS_DIFFICULTY: NO
ADLS_ACUITY_SCORE: 38
ADLS_ACUITY_SCORE: 42
ADLS_ACUITY_SCORE: 43
ADLS_ACUITY_SCORE: 52
ADLS_ACUITY_SCORE: 44
ADLS_ACUITY_SCORE: 38
ADLS_ACUITY_SCORE: 46
ADLS_ACUITY_SCORE: 47
ADLS_ACUITY_SCORE: 47
ADLS_ACUITY_SCORE: 38
ADLS_ACUITY_SCORE: 42
ADLS_ACUITY_SCORE: 38
ADLS_ACUITY_SCORE: 48
ADLS_ACUITY_SCORE: 42
ADLS_ACUITY_SCORE: 47
ADLS_ACUITY_SCORE: 47
ADLS_ACUITY_SCORE: 50
ADLS_ACUITY_SCORE: 50
ADLS_ACUITY_SCORE: 43
ADLS_ACUITY_SCORE: 36
ADLS_ACUITY_SCORE: 46
ADLS_ACUITY_SCORE: 43
ADLS_ACUITY_SCORE: 48
ADLS_ACUITY_SCORE: 46
ADLS_ACUITY_SCORE: 46
ADLS_ACUITY_SCORE: 44
ADLS_ACUITY_SCORE: 46
ADLS_ACUITY_SCORE: 48
ADLS_ACUITY_SCORE: 30
ADLS_ACUITY_SCORE: 42
ADLS_ACUITY_SCORE: 48
ADLS_ACUITY_SCORE: 30
ADLS_ACUITY_SCORE: 47
ADLS_ACUITY_SCORE: 42
ADLS_ACUITY_SCORE: 37
ADLS_ACUITY_SCORE: 37
ADLS_ACUITY_SCORE: 42
ADLS_ACUITY_SCORE: 38
ADLS_ACUITY_SCORE: 50
ADLS_ACUITY_SCORE: 45
ADLS_ACUITY_SCORE: 44
ADLS_ACUITY_SCORE: 46
ADLS_ACUITY_SCORE: 52
ADLS_ACUITY_SCORE: 42
ADLS_ACUITY_SCORE: 46
ADLS_ACUITY_SCORE: 36
ADLS_ACUITY_SCORE: 38
ADLS_ACUITY_SCORE: 36
ADLS_ACUITY_SCORE: 52
ADLS_ACUITY_SCORE: 46
ADLS_ACUITY_SCORE: 52
ADLS_ACUITY_SCORE: 38
ADLS_ACUITY_SCORE: 38
ADLS_ACUITY_SCORE: 46
ADLS_ACUITY_SCORE: 45
ADLS_ACUITY_SCORE: 46
ADLS_ACUITY_SCORE: 50
ADLS_ACUITY_SCORE: 45
DEPENDENT_IADLS:: INDEPENDENT
DRESSING/BATHING_DIFFICULTY: YES
ADLS_ACUITY_SCORE: 48
ADLS_ACUITY_SCORE: 42
ADLS_ACUITY_SCORE: 47
ADLS_ACUITY_SCORE: 38
ADLS_ACUITY_SCORE: 52
ADLS_ACUITY_SCORE: 42
ADLS_ACUITY_SCORE: 48
ADLS_ACUITY_SCORE: 46
ADLS_ACUITY_SCORE: 48
ADLS_ACUITY_SCORE: 46
ADLS_ACUITY_SCORE: 46
ADLS_ACUITY_SCORE: 42
ADLS_ACUITY_SCORE: 48
ADLS_ACUITY_SCORE: 46
ADLS_ACUITY_SCORE: 48
ADLS_ACUITY_SCORE: 42
ADLS_ACUITY_SCORE: 48
ADLS_ACUITY_SCORE: 48
ADLS_ACUITY_SCORE: 50
ADLS_ACUITY_SCORE: 46
ADLS_ACUITY_SCORE: 46
ADLS_ACUITY_SCORE: 52
VISION_MANAGEMENT: GLASSES
ADLS_ACUITY_SCORE: 48
ADLS_ACUITY_SCORE: 46
ADLS_ACUITY_SCORE: 52
ADLS_ACUITY_SCORE: 46
ADLS_ACUITY_SCORE: 38
ADLS_ACUITY_SCORE: 48
ADLS_ACUITY_SCORE: 46
ADLS_ACUITY_SCORE: 46
ADLS_ACUITY_SCORE: 43
ADLS_ACUITY_SCORE: 48
ADLS_ACUITY_SCORE: 46
ADLS_ACUITY_SCORE: 46
ADLS_ACUITY_SCORE: 42
ADLS_ACUITY_SCORE: 38
ADLS_ACUITY_SCORE: 45
ADLS_ACUITY_SCORE: 47
ADLS_ACUITY_SCORE: 47
ADLS_ACUITY_SCORE: 45
ADLS_ACUITY_SCORE: 38
ADLS_ACUITY_SCORE: 52
ADLS_ACUITY_SCORE: 44
ADLS_ACUITY_SCORE: 46
ADLS_ACUITY_SCORE: 48
ADLS_ACUITY_SCORE: 46
ADLS_ACUITY_SCORE: 46
ADLS_ACUITY_SCORE: 52
ADLS_ACUITY_SCORE: 52
ADLS_ACUITY_SCORE: 46
ADLS_ACUITY_SCORE: 50
ADLS_ACUITY_SCORE: 38
TOILETING_ASSISTANCE: TOILETING DIFFICULTY, DEPENDENT
ADLS_ACUITY_SCORE: 37
ADLS_ACUITY_SCORE: 48
ADLS_ACUITY_SCORE: 42
ADLS_ACUITY_SCORE: 48
ADLS_ACUITY_SCORE: 46
ADLS_ACUITY_SCORE: 48
ADLS_ACUITY_SCORE: 48
ADL_SUM: 49
ADLS_ACUITY_SCORE: 46
ADLS_ACUITY_SCORE: 47
ADLS_ACUITY_SCORE: 38
ADLS_ACUITY_SCORE: 50
ADLS_ACUITY_SCORE: 46
ADLS_ACUITY_SCORE: 46
ADLS_ACUITY_SCORE: 47
ADLS_ACUITY_SCORE: 52
ADLS_ACUITY_SCORE: 42
ADLS_ACUITY_SCORE: 38
ADLS_ACUITY_SCORE: 48
ADLS_ACUITY_SCORE: 38
ADLS_ACUITY_SCORE: 50
ADLS_ACUITY_SCORE: 42
ADLS_ACUITY_SCORE: 46
ADLS_ACUITY_SCORE: 46
ADLS_ACUITY_SCORE: 43
ADLS_ACUITY_SCORE: 48
ADLS_ACUITY_SCORE: 36
ADLS_ACUITY_SCORE: 45
ADLS_ACUITY_SCORE: 52
ADLS_ACUITY_SCORE: 45
ADLS_ACUITY_SCORE: 47
ADLS_ACUITY_SCORE: 38
ADLS_ACUITY_SCORE: 52
ADLS_ACUITY_SCORE: 47
ADLS_ACUITY_SCORE: 42
ADLS_ACUITY_SCORE: 50
ADLS_ACUITY_SCORE: 30
ADLS_ACUITY_SCORE: 47
ADLS_ACUITY_SCORE: 45
ADLS_ACUITY_SCORE: 42
ADLS_ACUITY_SCORE: 46
ADLS_ACUITY_SCORE: 37
ADLS_ACUITY_SCORE: 48
ADLS_ACUITY_SCORE: 38
WEAR_GLASSES_OR_BLIND: YES
ADLS_ACUITY_SCORE: 48
ADLS_ACUITY_SCORE: 52
ADLS_ACUITY_SCORE: 47
ADLS_ACUITY_SCORE: 48
ADLS_ACUITY_SCORE: 36
ADLS_ACUITY_SCORE: 47
ADLS_ACUITY_SCORE: 46
ADLS_ACUITY_SCORE: 46
ADLS_ACUITY_SCORE: 45
ADLS_ACUITY_SCORE: 50
ADLS_ACUITY_SCORE: 46
ADLS_ACUITY_SCORE: 36
ADLS_ACUITY_SCORE: 47
ADLS_ACUITY_SCORE: 46
ADLS_ACUITY_SCORE: 38
ADLS_ACUITY_SCORE: 48
ADLS_ACUITY_SCORE: 50
ADLS_ACUITY_SCORE: 47
ADLS_ACUITY_SCORE: 45
ADLS_ACUITY_SCORE: 38
ADLS_ACUITY_SCORE: 38
WALKING_OR_CLIMBING_STAIRS_DIFFICULTY: YES
ADLS_ACUITY_SCORE: 50
ADLS_ACUITY_SCORE: 43
ADLS_ACUITY_SCORE: 48
ADLS_ACUITY_SCORE: 46
ADLS_ACUITY_SCORE: 43
ADLS_ACUITY_SCORE: 30
ADLS_ACUITY_SCORE: 46
ADLS_ACUITY_SCORE: 42
ADLS_ACUITY_SCORE: 42
ADLS_ACUITY_SCORE: 30
ADLS_ACUITY_SCORE: 46
ADLS_ACUITY_SCORE: 48
TOILETING_ISSUES: YES
ADLS_ACUITY_SCORE: 48
ADLS_ACUITY_SCORE: 46
ADLS_ACUITY_SCORE: 42
ADLS_ACUITY_SCORE: 48
ADLS_ACUITY_SCORE: 38
ADLS_ACUITY_SCORE: 48
ADLS_ACUITY_SCORE: 38
ADLS_ACUITY_SCORE: 43
TOILETING_ASSISTANCE: TOILETING DIFFICULTY, DEPENDENT
ADLS_ACUITY_SCORE: 52
ADLS_ACUITY_SCORE: 50
ADLS_ACUITY_SCORE: 48
DIFFICULTY_EATING/SWALLOWING: NO
ADLS_ACUITY_SCORE: 46
ADLS_ACUITY_SCORE: 48
ADLS_ACUITY_SCORE: 46
ADLS_ACUITY_SCORE: 42
ADLS_ACUITY_SCORE: 50
ADLS_ACUITY_SCORE: 46
ADLS_ACUITY_SCORE: 48
ADLS_ACUITY_SCORE: 52
ADLS_ACUITY_SCORE: 52
ADLS_ACUITY_SCORE: 36
ADLS_ACUITY_SCORE: 46
ADLS_ACUITY_SCORE: 30
ADLS_ACUITY_SCORE: 50
ADLS_ACUITY_SCORE: 47
ADLS_ACUITY_SCORE: 48
ADLS_ACUITY_SCORE: 45
ADLS_ACUITY_SCORE: 38
ADLS_ACUITY_SCORE: 36
ADLS_ACUITY_SCORE: 50
ADLS_ACUITY_SCORE: 42
ADLS_ACUITY_SCORE: 36
ADLS_ACUITY_SCORE: 46
ADLS_ACUITY_SCORE: 46
ADLS_ACUITY_SCORE: 47
ADLS_ACUITY_SCORE: 37
ADLS_ACUITY_SCORE: 46
ADLS_ACUITY_SCORE: 46
ADLS_ACUITY_SCORE: 43
ADLS_ACUITY_SCORE: 45
ADLS_ACUITY_SCORE: 42
ADLS_ACUITY_SCORE: 38
ADLS_ACUITY_SCORE: 42
ADLS_ACUITY_SCORE: 52
ADLS_ACUITY_SCORE: 50
ADLS_ACUITY_SCORE: 36
ADLS_ACUITY_SCORE: 46
DOING_ERRANDS_INDEPENDENTLY_DIFFICULTY: NO
ADLS_ACUITY_SCORE: 42
EQUIPMENT_CURRENTLY_USED_AT_HOME: CANE, STRAIGHT
ADLS_ACUITY_SCORE: 50
ADLS_ACUITY_SCORE: 42
ADLS_ACUITY_SCORE: 43
ADLS_ACUITY_SCORE: 52
ADLS_ACUITY_SCORE: 46
ADLS_ACUITY_SCORE: 30
ADLS_ACUITY_SCORE: 30
ADLS_ACUITY_SCORE: 52
ADLS_ACUITY_SCORE: 48
ADLS_ACUITY_SCORE: 46
ADLS_ACUITY_SCORE: 38
ADLS_ACUITY_SCORE: 46
ADLS_ACUITY_SCORE: 46
ADLS_ACUITY_SCORE: 48
ADLS_ACUITY_SCORE: 48
ADLS_ACUITY_SCORE: 42
ADLS_ACUITY_SCORE: 38
ADLS_ACUITY_SCORE: 34
WALKING_OR_CLIMBING_STAIRS: AMBULATION DIFFICULTY, REQUIRES EQUIPMENT
ADLS_ACUITY_SCORE: 38
ADLS_ACUITY_SCORE: 42
ADLS_ACUITY_SCORE: 47
ADLS_ACUITY_SCORE: 46
ADLS_ACUITY_SCORE: 46
ADLS_ACUITY_SCORE: 45
ADLS_ACUITY_SCORE: 46
ADLS_ACUITY_SCORE: 43
ADLS_ACUITY_SCORE: 42
ADLS_ACUITY_SCORE: 48
ADLS_ACUITY_SCORE: 42
PREVIOUS_RESPONSIBILITIES: MEAL PREP;HOUSEKEEPING;LAUNDRY;SHOPPING
ADLS_ACUITY_SCORE: 43
ADLS_ACUITY_SCORE: 52
ADLS_ACUITY_SCORE: 48
ADLS_ACUITY_SCORE: 52
ADLS_ACUITY_SCORE: 46
ADLS_ACUITY_SCORE: 45
ADLS_ACUITY_SCORE: 42
ADLS_ACUITY_SCORE: 44
ADLS_ACUITY_SCORE: 52
ADLS_ACUITY_SCORE: 45
ADLS_ACUITY_SCORE: 45
ADLS_ACUITY_SCORE: 42
ADLS_ACUITY_SCORE: 46
ADLS_ACUITY_SCORE: 42
ADLS_ACUITY_SCORE: 43
ADL_SUBSCALE_SCORE: 27.94
ADLS_ACUITY_SCORE: 47
ADLS_ACUITY_SCORE: 47
ADLS_ACUITY_SCORE: 46
NUMBER_OF_TIMES_PATIENT_HAS_FALLEN_WITHIN_LAST_SIX_MONTHS: 1
ADLS_ACUITY_SCORE: 47
ADLS_ACUITY_SCORE: 48
ADLS_ACUITY_SCORE: 44
ADLS_ACUITY_SCORE: 47
ADLS_ACUITY_SCORE: 43
ADLS_ACUITY_SCORE: 42
ADLS_ACUITY_SCORE: 46
ADLS_ACUITY_SCORE: 45
ADLS_ACUITY_SCORE: 52
ADLS_ACUITY_SCORE: 47
ADLS_ACUITY_SCORE: 47
ADLS_ACUITY_SCORE: 50
ADLS_ACUITY_SCORE: 48
TOILETING_ISSUES: YES
ADLS_ACUITY_SCORE: 46
ADLS_ACUITY_SCORE: 46
ADLS_ACUITY_SCORE: 48
ADLS_ACUITY_SCORE: 47
ADLS_ACUITY_SCORE: 30
ADLS_ACUITY_SCORE: 46
ADLS_ACUITY_SCORE: 38
ADLS_ACUITY_SCORE: 36
ADLS_ACUITY_SCORE: 47
ADLS_ACUITY_SCORE: 45
ADLS_ACUITY_SCORE: 42
ADLS_ACUITY_SCORE: 50
ADLS_ACUITY_SCORE: 36
ADLS_ACUITY_SCORE: 42
ADLS_ACUITY_SCORE: 47
ADLS_ACUITY_SCORE: 50
ADLS_ACUITY_SCORE: 45
ADLS_ACUITY_SCORE: 46
ADLS_ACUITY_SCORE: 46
ADL_MEAN: 2.88
ADLS_ACUITY_SCORE: 48
ADLS_ACUITY_SCORE: 52
ADLS_ACUITY_SCORE: 42
ADLS_ACUITY_SCORE: 38
ADLS_ACUITY_SCORE: 46
ADLS_ACUITY_SCORE: 46
ADLS_ACUITY_SCORE: 48
ADLS_ACUITY_SCORE: 46
ADLS_ACUITY_SCORE: 50
ADLS_ACUITY_SCORE: 47
ADLS_ACUITY_SCORE: 46
ADLS_ACUITY_SCORE: 46
FALL_HISTORY_WITHIN_LAST_SIX_MONTHS: YES
ADLS_ACUITY_SCORE: 30
ADLS_ACUITY_SCORE: 43
ADLS_ACUITY_SCORE: 46
ADLS_ACUITY_SCORE: 26
ADLS_ACUITY_SCORE: 30
ADLS_ACUITY_SCORE: 43
ADLS_ACUITY_SCORE: 46
ADLS_ACUITY_SCORE: 34
ADLS_ACUITY_SCORE: 46
ADLS_ACUITY_SCORE: 46
ADLS_ACUITY_SCORE: 38
ADLS_ACUITY_SCORE: 42

## 2022-01-01 ASSESSMENT — HOOS PS
HOOS PS TOTAL SCORE: 32.1
RUNNING: EXTREME
DESCENDING STAIRS: SEVERE
GETTING IN/OUT OF BATH OR SHOWER: SEVERE
SITTING: MODERATE
TWISTING/PIVOTING ON YOUR LOADED LEG: EXTREME

## 2022-01-01 ASSESSMENT — ENCOUNTER SYMPTOMS
SEIZURES: 0
DYSRHYTHMIAS: 0

## 2022-01-01 ASSESSMENT — LIFESTYLE VARIABLES: TOBACCO_USE: 0

## 2022-01-01 ASSESSMENT — HOOS S4: HOW SEVERE IS YOUR HIP JOINT STIFFNESS AFTER FIRST WAKENING IN THE MORNING?: EXTREME

## 2022-01-01 ASSESSMENT — PAIN SCALES - GENERAL: PAINLEVEL: EXTREME PAIN (9)

## 2022-01-26 LAB
ALBUMIN (URINE) MG/SPEC: 20.9 MG/L
ALBUMIN/CREATININE RATIO: 10.6 MG/G CREAT
ALT SERPL-CCNC: 6 IU/L (ref 8–45)
AST SERPL-CCNC: 9 IU/L (ref 2–40)
CHOLESTEROL (EXTERNAL): 129 MG/DL (ref 100–199)
CREATININE (EXTERNAL): 1.09 MG/DL (ref 0.72–1.25)
CREATININE (URINE): 1.97
GFR ESTIMATED (EXTERNAL): >60 ML/MIN/1.73M2
GFR ESTIMATED (IF AFRICAN AMERICAN) (EXTERNAL): >60 ML/MIN/1.73M2
GLUCOSE (EXTERNAL): 139 MG/DL (ref 65–100)
HBA1C MFR BLD: 11.5 %
HDLC SERPL-MCNC: 26 MG/DL
LDL CHOLESTEROL CALCULATED (EXTERNAL): 69 MG/DL
NON HDL CHOLESTEROL (EXTERNAL): 103 MG/DL
POTASSIUM (EXTERNAL): 4.3 MMOL/L (ref 3.5–5)
TRIGLYCERIDES (EXTERNAL): 170 MG/DL

## 2022-01-27 ENCOUNTER — TRANSFERRED RECORDS (OUTPATIENT)
Dept: HEALTH INFORMATION MANAGEMENT | Facility: CLINIC | Age: 72
End: 2022-01-27
Payer: COMMERCIAL

## 2022-01-31 ENCOUNTER — MEDICAL CORRESPONDENCE (OUTPATIENT)
Dept: HEALTH INFORMATION MANAGEMENT | Facility: CLINIC | Age: 72
End: 2022-01-31
Payer: COMMERCIAL

## 2022-02-02 ENCOUNTER — TRANSFERRED RECORDS (OUTPATIENT)
Dept: HEALTH INFORMATION MANAGEMENT | Facility: CLINIC | Age: 72
End: 2022-02-02
Payer: COMMERCIAL

## 2022-02-23 ENCOUNTER — TRANSFERRED RECORDS (OUTPATIENT)
Dept: HEALTH INFORMATION MANAGEMENT | Facility: CLINIC | Age: 72
End: 2022-02-23
Payer: COMMERCIAL

## 2022-04-01 ENCOUNTER — TRANSFERRED RECORDS (OUTPATIENT)
Dept: HEALTH INFORMATION MANAGEMENT | Facility: CLINIC | Age: 72
End: 2022-04-01
Payer: COMMERCIAL

## 2022-04-01 LAB
ALT SERPL-CCNC: 9 IU/L (ref 8–45)
AST SERPL-CCNC: 16 IU/L (ref 2–40)
CREATININE (EXTERNAL): 1.32 MG/DL (ref 0.72–1.25)
GFR ESTIMATED (EXTERNAL): 58 ML/MIN/1.73M2
GLUCOSE (EXTERNAL): 122 MG/DL (ref 65–100)
POTASSIUM (EXTERNAL): 4.7 MMOL/L (ref 3.5–5)

## 2022-04-10 ENCOUNTER — HEALTH MAINTENANCE LETTER (OUTPATIENT)
Age: 72
End: 2022-04-10

## 2022-04-11 ENCOUNTER — TELEPHONE (OUTPATIENT)
Dept: ORTHOPEDICS | Facility: CLINIC | Age: 72
End: 2022-04-11
Payer: COMMERCIAL

## 2022-04-11 NOTE — TELEPHONE ENCOUNTER
Health Call Center    Phone Message    May a detailed message be left on voicemail: yes     Reason for Call Patient called to set up appt with Dr Meyers. He is being told by Loreta Haskins to see Dr Meyers within 11 days . There are no appt within 11 days for New Patient . Please call  Action Taken: Message routed to:  [facility] ([facility]    Travel Screening: Not Applicable

## 2022-04-12 NOTE — TELEPHONE ENCOUNTER
ATC called patient multiple times.  Left a VM for callback.  Please schedule patient with Dr. Meyers for first new available when patient calls.            -TRUONG Donald- Orthopedics

## 2022-04-13 ENCOUNTER — TRANSFERRED RECORDS (OUTPATIENT)
Dept: HEALTH INFORMATION MANAGEMENT | Facility: CLINIC | Age: 72
End: 2022-04-13
Payer: COMMERCIAL

## 2022-04-13 LAB
ALT SERPL-CCNC: 13 IU/L (ref 8–45)
AST SERPL-CCNC: 11 IU/L (ref 2–40)
CREATININE (EXTERNAL): 0.82 MG/DL (ref 0.72–1.25)
GFR ESTIMATED (EXTERNAL): >90 ML/MIN/1.73M2
GLUCOSE (EXTERNAL): 95 MG/DL (ref 65–100)
POTASSIUM (EXTERNAL): 4.7 MMOL/L (ref 3.5–5)

## 2022-04-22 NOTE — TELEPHONE ENCOUNTER
Action    Action Taken 4/22/2022 10:17AM NAHOMI     I called [facility] [phone] - They will push hip imaging from 2019-Present    10:51AM NAHOMI   I resolved imaging in PACS     DIAGNOSIS: 2nd opinion, left hip   APPOINTMENT DATE: 5/19/2022   NOTES STATUS DETAILS   OFFICE NOTE from referring provider     MEDICATION LIST Internal    LABS     CBC/DIFF Care Everywhere    CT SCAN Internal    Received- [facility] CT Pelvis 7/6/2018     MRI [facility] 6/4/2018    XRAYS (IMAGES & REPORTS) Complete Xray Hip- [facility]    TUMOR     PATHOLOGY  Slides & report

## 2022-05-18 ENCOUNTER — DOCUMENTATION ONLY (OUTPATIENT)
Dept: ORTHOPEDICS | Facility: CLINIC | Age: 72
End: 2022-05-18
Payer: COMMERCIAL

## 2022-05-18 NOTE — PROGRESS NOTES
3/7/18 - Left Two incision Total Hip arthroplasty - Rogelio Shi MD @ [facility]     1/23/19 - Left Femoral Total Hip Arthroplasty Revision - Rogelio Shi MD @ [facility]         Calista Cardona ATC

## 2022-05-19 ENCOUNTER — OFFICE VISIT (OUTPATIENT)
Dept: ORTHOPEDICS | Facility: CLINIC | Age: 72
End: 2022-05-19
Payer: COMMERCIAL

## 2022-05-19 ENCOUNTER — PRE VISIT (OUTPATIENT)
Dept: ORTHOPEDICS | Facility: CLINIC | Age: 72
End: 2022-05-19

## 2022-05-19 VITALS — BODY MASS INDEX: 42 KG/M2 | HEIGHT: 71 IN | WEIGHT: 300 LBS

## 2022-05-19 DIAGNOSIS — T84.50XA PROSTHETIC JOINT INFECTION, INITIAL ENCOUNTER (H): Primary | ICD-10-CM

## 2022-05-19 PROCEDURE — 99204 OFFICE O/P NEW MOD 45 MIN: CPT | Performed by: ORTHOPAEDIC SURGERY

## 2022-05-19 RX ORDER — AMOXICILLIN 500 MG/1
500 CAPSULE ORAL 2 TIMES DAILY
Status: ON HOLD | COMMUNITY
Start: 2022-05-16 | End: 2022-01-01

## 2022-05-19 RX ORDER — APIXABAN 5 MG/1
5 TABLET, FILM COATED ORAL 2 TIMES DAILY
Status: ON HOLD | COMMUNITY
Start: 2022-04-01 | End: 2022-01-01

## 2022-05-19 RX ORDER — METFORMIN HCL 500 MG
TABLET, EXTENDED RELEASE 24 HR ORAL
COMMUNITY
End: 2022-01-01

## 2022-05-19 RX ORDER — BLOOD SUGAR DIAGNOSTIC
STRIP MISCELLANEOUS
Status: ON HOLD | COMMUNITY
Start: 2021-07-27 | End: 2023-01-01

## 2022-05-19 RX ORDER — METFORMIN HCL 500 MG
2000 TABLET, EXTENDED RELEASE 24 HR ORAL EVERY MORNING
Status: ON HOLD | COMMUNITY
Start: 2022-05-06 | End: 2023-01-01

## 2022-05-19 RX ORDER — OMEPRAZOLE 40 MG/1
40 CAPSULE, DELAYED RELEASE ORAL
COMMUNITY
Start: 2022-01-26 | End: 2022-01-01

## 2022-05-19 RX ORDER — ATORVASTATIN CALCIUM 40 MG/1
40 TABLET, FILM COATED ORAL
COMMUNITY
Start: 2022-01-26 | End: 2022-01-01

## 2022-05-19 RX ORDER — ACETAMINOPHEN 325 MG/1
650 TABLET ORAL 2 TIMES DAILY
Status: ON HOLD | COMMUNITY
End: 2022-01-01

## 2022-05-19 ASSESSMENT — ACTIVITIES OF DAILY LIVING (ADL)
ADL_SUM: 38
ADL_MEAN: 2.24
ADL_SUBSCALE_SCORE: 44.12

## 2022-05-19 ASSESSMENT — HOOS S4: HOW SEVERE IS YOUR HIP JOINT STIFFNESS AFTER FIRST WAKENING IN THE MORNING?: SEVERE

## 2022-05-19 NOTE — LETTER
5/19/2022         RE: Waldo Bustos  [address]  [facility] [address]        Dear Colleague,    Thank you for referring your patient, Waldo Bustos, to the [facility] [place]. Please see a copy of my visit note below.        [facility]  Orthopaedic Surgery, Joint Replacement Consultation  by Rom Meyers M.D.    Waldo Bustos MRN# [id number]    YOB: 1950     Requesting physician: No ref. provider found  Jv Felix, PCP  Rogelio Shi MD [facility] orthopedics  Vick, MD Rama Parish Albero, ID            Assessment and Plan:   Assessment:  1. Left BELLA prosthetic joint infection, chronic, Cutibacterium acnes.  Cannot rule out polymicrobial infection.  Currently on suppressive therapy with amoxicillin and drainage catheter.  2. Diabetes mellitus  3. Morbid obesity, BMI 42.44 kg/m        Plan:  I had long conversation with the patient regarding his difficult situation.  Factors contributing to his infection are with his morbid obesity and poorly controlled diabetes.  It appears to me that he had initial infection with a low-grade indolent infection which has never been completely cleared.  This manifest with the C.acnes positive culture.  For this truly represents his offending organism or is a contaminant is difficult to know with certainty.    I advised that he undergo a two-stage exchange arthroplasty.  I explained the nature of the procedure, the expected outcome and risk benefits alternatives to the procedure.  I explained that a 1 stage exchange arthroplasty would be considered and that Dr. Jem Hickman in general has a preference for this procedure.      I advised that he focus his efforts on tighter control of his diabetes as well as weight reduction over time.  Also advised that he return to see Dr. Rogelio Shi who apparently is unaware of his ongoing infection and also see Dr. Jem Hickman for another opinion if he wishes to  consider a 1 stage exchange arthroplasty procedure.    If he wishes to pursue treatment here at [facility], then preparations for two-stage exchange arthroplasty would include obtaining up-to-date x-rays, detailed implant information about the existing implant, preoperative anesthesia clinic evaluation.      MD Vanessa Stephenson Family Professor  Oncology and Adult Reconstructive Surgery  Dept Orthopaedic Surgery, [facility]  [phone] office, [phone] pager  [email or web address]             History of Present Illness:   71 year old male  chief complaint  This gentleman is seen for second opinion consultation in regards to left total hip arthroplasty infection.  History dates back to 2018 when he underwent total hip arthroplasty with Dr. Rogelio Shi and reportedly had problems with an infection afterwards.  I do not have all records available to me or organism involved.  Nonetheless the patient underwent a revision procedure in January 2019 for loosening thought to be secondary to an infection thereby in essence of 1 stage exchange.  He was treated with intravenous ceftriaxone for 4 weeks afterwards.    It is unclear to me whether he was on any additional antibiotic therapy until 2022 when he was having pain and saw Dr. Rogelio Shi in March and a bone scan was performed.  Recommendation at that time was to see Dr. Jem Hickman for another opinion.  Meanwhile the patient was having problems with anemia and ended up being admitted to [facility] this past month.  CT scan was performed demonstrating an abscess within his left iliopsoas muscle that communicated with the hip joint.  An aspiration was performed (4/6/2022) and cultures demonstrated QT bacterium bacterium acnes.  I am unable to identify cell count, synovasure or other testing.  A pigtail catheter drainage procedure was performed and he was placed on antibiotic therapy and presently is on amoxicillin.  Patient states he is not on any  antibiotic therapy when the aspiration was completed on April 6.    Patient is a known diabetic with poor control (hemoglobin A1c 11.5).  He lives at home with his common-law spouse and ambulates with use of a cane.      Background history:  DX:  1. Morbid obesity Body mass index is 42.44 kg/m .  2. Diabetes mellitus  3. Left BELLA prosthetic joint infection      TREATMENTS:  1. 3/7/2018, left to incision total hip arthroplasty ([facility])  2. 1/23/2019, revision left total hip arthroplasty ([facility]) [facility]. IV Ceftriaxone x 4 weeks  3. 2/2021, L hip aspiration ([facility])  4. 4/6/2022, C Acnes  5. 4/27/2022, drain placement. C Acnes.           Physical Exam:     EXAMINATION pertinent findings:   PSYCH: Pleasant, healthy-appearing, alert, oriented x3, cooperative. Normal mood and affect.  VITAL SIGNS: There were no vitals taken for this visit..  Reviewed nursing intake notes.   There is no height or weight on file to calculate BMI.  RESP: non labored breathing   ABD: benign, soft, non-tender, no acute peritoneal findings  SKIN: grossly normal   LYMPHATIC: grossly normal, no adenopathy, no extremity edema  NEURO: grossly normal , no motor deficits  VASCULAR: satisfactory perfusion of all extremities   MUSCULOSKELETAL:   Left posterior lateral and anterior incisions well-healed.  Minimal erythema.  Abdominal panniculus noted.  Minimal pain with motion of the hip joint.  Patient is ambulatory.  Catheter remains in place.             Data:   All laboratory data reviewed  All imaging studies reviewed by me                   DATA for DOCUMENTATION:         Past Medical History:     Patient Active Problem List   Diagnosis     Achilles bursitis or tendinitis     Acute blood loss anemia     Calcification of tendon     Gastroesophageal reflux disease without esophagitis     Heel spur     Inguinal hernia     Morbid obesity (H)     JAIR (obstructive sleep apnea)     Hyperlipidemia     Aftercare following surgery of the  musculoskeletal system     Uncontrolled diabetes mellitus (H)     Umbilical hernia     Venous insufficiency     Past Medical History:   Diagnosis Date     Arthritis 5 years ago     Chronic osteoarthritis Not sure     Diabetes (H) 10-12 years ago     RA (rheumatoid arthritis) (H) Not sure     Reduced vision 2-3 years ago    Cataract Surgery on both eyes       Also see scanned health assessment forms.       Past Surgical History:     Past Surgical History:   Procedure Laterality Date     C PELVIS/HIP JOINT SURGERY UNLISTED       C STOMACH SURGERY PROCEDURE UNLISTED  1955    2 Hernia surgeries as a child            Social History:     Social History     Socioeconomic History     Marital status: Single     Spouse name: Not on file     Number of children: Not on file     Years of education: Not on file     Highest education level: Not on file   Occupational History     Not on file   Tobacco Use     Smoking status: Never Smoker     Smokeless tobacco: Never Used   Substance and Sexual Activity     Alcohol use: No     Drug use: No     Sexual activity: Never   Other Topics Concern     Not on file   Social History Narrative     Not on file     Social Determinants of Health     Financial Resource Strain: Not on file   Food Insecurity: Not on file   Transportation Needs: Not on file   Physical Activity: Not on file   Stress: Not on file   Social Connections: Not on file   Intimate Partner Violence: Not on file   Housing Stability: Not on file            Family History:       Family History   Problem Relation Age of Onset     Diabetes No family hx of      Rheumatoid Arthritis No family hx of      Low Back Problems No family hx of      Unknown/Adopted No family hx of             Medications:     Current Outpatient Medications   Medication Sig     atorvastatin (LIPITOR) 40 MG tablet Take 40 mg by mouth     BD CEM U/F 32G X 4 MM insulin pen needle      cholecalciferol (VITAMIN D/D-VI-SOL) 400 UNIT/ML LIQD liquid Take 3,000 Units  by mouth daily     COENZYME Q-10 PO Take 50 mg by mouth daily     fluticasone (FLONASE) 50 MCG/ACT spray Spray 1 spray into both nostrils daily     furosemide (LASIX) 20 MG tablet      glipiZIDE (GLUCOTROL XL) 10 MG 24 hr tablet      HYDROcodone-acetaminophen (NORCO)  MG per tablet      Indomethacin (INDOCIN PO) Take 50 mg by mouth 3 times daily     metFORMIN (GLUCOPHAGE-XR) 500 MG 24 hr tablet      Misc. Devices (WALKER AUTO GLIDES) MISC 2 Wheeled Walker for home use. For 6 weeks.     Multiple Vitamin (MULTI-VITAMINS) TABS Take 1 tablet by mouth     omeprazole (PRILOSEC) 40 MG capsule      oxyCODONE IR (ROXICODONE) 5 MG tablet      pioglitazone (ACTOS) 45 MG tablet      senna-docusate (SENOKOT-S;PERICOLACE) 8.6-50 MG per tablet Take 1 tablet by mouth     tiZANidine (ZANAFLEX) 4 MG tablet Take 4 mg by mouth     triamcinolone (KENALOG) 0.1 % cream      VICTOZA PEN 18 MG/3ML soln      No current facility-administered medications for this visit.              Review of Systems:   A comprehensive 10 point review of systems (constitutional, ENT, cardiac, peripheral vascular, lymphatic, respiratory, GI, , Musculoskeletal, skin, Neurological) was performed and found to be negative except as described in this note.       HOOS Hip Dysfunction & Osteoarthritis Outcome Questionnaire    No flowsheet data found.           [unfilled]    KOOS Knee Survey Assessment    No flowsheet data found.           Promis 10 Assessment    No flowsheet data found.           Ortho [facility] Knee Questionnaire    No flowsheet data found.             See intake form completed by patient

## 2022-05-19 NOTE — PROGRESS NOTES
[facility]  Orthopaedic Surgery, Joint Replacement Consultation  by Rom Meyers M.D.    Waldo Bustos MRN# [id number]    YOB: 1950     Requesting physician: No ref. provider found  Jv Felix, PCP  Rogelio Shi MD [facility]  Vick, MD Rama Parish Albero, [place]            Assessment and Plan:   Assessment:  1. Left BELLA prosthetic joint infection, chronic, Cutibacterium acnes.  Cannot rule out polymicrobial infection.  Currently on suppressive therapy with amoxicillin and drainage catheter.  2. Diabetes mellitus  3. Morbid obesity, BMI 42.44 kg/m        Plan:  I had long conversation with the patient regarding his difficult situation.  Factors contributing to his infection are with his morbid obesity and poorly controlled diabetes.  It appears to me that he had initial infection with a low-grade indolent infection which has never been completely cleared.  This manifest with the C.acnes positive culture.  For this truly represents his offending organism or is a contaminant is difficult to know with certainty.    I advised that he undergo a two-stage exchange arthroplasty.  I explained the nature of the procedure, the expected outcome and risk benefits alternatives to the procedure.  I explained that a 1 stage exchange arthroplasty would be considered and that Dr. Jem Hickman in general has a preference for this procedure.      I advised that he focus his efforts on tighter control of his diabetes as well as weight reduction over time.  Also advised that he return to see Dr. Rogelio Shi who apparently is unaware of his ongoing infection and also see Dr. Jem Hickman for another opinion if he wishes to consider a 1 stage exchange arthroplasty procedure.    If he wishes to pursue treatment here at [place], then preparations for two-stage exchange arthroplasty would include obtaining up-to-date x-rays, detailed implant information about the existing implant,  preoperative anesthesia clinic evaluation.      MD Vanessa Stephenson Family Professor  Oncology and Adult Reconstructive Surgery  Dept Orthopaedic Surgery, formerly [facility]  [phone] office, [phone] pager  [email or web address]             History of Present Illness:   71 year old male  chief complaint  This gentleman is seen for second opinion consultation in regards to left total hip arthroplasty infection.  History dates back to 2018 when he underwent total hip arthroplasty with Dr. Rogelio Shi and reportedly had problems with an infection afterwards.  I do not have all records available to me or organism involved.  Nonetheless the patient underwent a revision procedure in January 2019 for loosening thought to be secondary to an infection thereby in essence of 1 stage exchange.  He was treated with intravenous ceftriaxone for 4 weeks afterwards.    It is unclear to me whether he was on any additional antibiotic therapy until 2022 when he was having pain and saw Dr. Rogelio Shi in March and a bone scan was performed.  Recommendation at that time was to see Dr. Jem Hickman for another opinion.  Meanwhile the patient was having problems with anemia and ended up being admitted to [facility] this past month.  CT scan was performed demonstrating an abscess within his left iliopsoas muscle that communicated with the hip joint.  An aspiration was performed (4/6/2022) and cultures demonstrated QT bacterium bacterium acnes.  I am unable to identify cell count, synovasure or other testing.  A pigtail catheter drainage procedure was performed and he was placed on antibiotic therapy and presently is on amoxicillin.  Patient states he is not on any antibiotic therapy when the aspiration was completed on April 6.    Patient is a known diabetic with poor control (hemoglobin A1c 11.5).  He lives at home with his common-law spouse and ambulates with use of a cane.      Background history:  DX:  1. Morbid obesity  Body mass index is 42.44 kg/m .  2. Diabetes mellitus  3. Left BELLA prosthetic joint infection      TREATMENTS:  1. 3/7/2018, left to incision total hip arthroplasty ([facility])  2. 1/23/2019, revision left total hip arthroplasty ([facility]. IV Ceftriaxone x 4 weeks  3. 2/2021, L hip aspiration ([facility])  4. 4/6/2022, C Acnes  5. 4/27/2022, drain placement. C Acnes.           Physical Exam:     EXAMINATION pertinent findings:   PSYCH: Pleasant, healthy-appearing, alert, oriented x3, cooperative. Normal mood and affect.  VITAL SIGNS: There were no vitals taken for this visit..  Reviewed nursing intake notes.   There is no height or weight on file to calculate BMI.  RESP: non labored breathing   ABD: benign, soft, non-tender, no acute peritoneal findings  SKIN: grossly normal   LYMPHATIC: grossly normal, no adenopathy, no extremity edema  NEURO: grossly normal , no motor deficits  VASCULAR: satisfactory perfusion of all extremities   MUSCULOSKELETAL:   Left posterior lateral and anterior incisions well-healed.  Minimal erythema.  Abdominal panniculus noted.  Minimal pain with motion of the hip joint.  Patient is ambulatory.  Catheter remains in place.             Data:   All laboratory data reviewed  All imaging studies reviewed by me                   DATA for DOCUMENTATION:         Past Medical History:     Patient Active Problem List   Diagnosis     Achilles bursitis or tendinitis     Acute blood loss anemia     Calcification of tendon     Gastroesophageal reflux disease without esophagitis     Heel spur     Inguinal hernia     Morbid obesity (H)     JAIR (obstructive sleep apnea)     Hyperlipidemia     Aftercare following surgery of the musculoskeletal system     Uncontrolled diabetes mellitus (H)     Umbilical hernia     Venous insufficiency     Past Medical History:   Diagnosis Date     Arthritis 5 years ago     Chronic osteoarthritis Not sure     Diabetes (H) 10-12 years ago     RA (rheumatoid arthritis)  (H) Not sure     Reduced vision 2-3 years ago    Cataract Surgery on both eyes       Also see scanned health assessment forms.       Past Surgical History:     Past Surgical History:   Procedure Laterality Date     C PELVIS/HIP JOINT SURGERY UNLISTED       C STOMACH SURGERY PROCEDURE UNLISTED  1955    2 Hernia surgeries as a child            Social History:     Social History     Socioeconomic History     Marital status: Single     Spouse name: Not on file     Number of children: Not on file     Years of education: Not on file     Highest education level: Not on file   Occupational History     Not on file   Tobacco Use     Smoking status: Never Smoker     Smokeless tobacco: Never Used   Substance and Sexual Activity     Alcohol use: No     Drug use: No     Sexual activity: Never   Other Topics Concern     Not on file   Social History Narrative     Not on file     Social Determinants of Health     Financial Resource Strain: Not on file   Food Insecurity: Not on file   Transportation Needs: Not on file   Physical Activity: Not on file   Stress: Not on file   Social Connections: Not on file   Intimate Partner Violence: Not on file   Housing Stability: Not on file            Family History:       Family History   Problem Relation Age of Onset     Diabetes No family hx of      Rheumatoid Arthritis No family hx of      Low Back Problems No family hx of      Unknown/Adopted No family hx of             Medications:     Current Outpatient Medications   Medication Sig     atorvastatin (LIPITOR) 40 MG tablet Take 40 mg by mouth     BD CEM U/F 32G X 4 MM insulin pen needle      cholecalciferol (VITAMIN D/D-VI-SOL) 400 UNIT/ML LIQD liquid Take 3,000 Units by mouth daily     COENZYME Q-10 PO Take 50 mg by mouth daily     fluticasone (FLONASE) 50 MCG/ACT spray Spray 1 spray into both nostrils daily     furosemide (LASIX) 20 MG tablet      glipiZIDE (GLUCOTROL XL) 10 MG 24 hr tablet      HYDROcodone-acetaminophen (NORCO)  MG  per tablet      Indomethacin (INDOCIN PO) Take 50 mg by mouth 3 times daily     metFORMIN (GLUCOPHAGE-XR) 500 MG 24 hr tablet      Misc. Devices (WALKER AUTO GLIDES) MISC 2 Wheeled Walker for home use. For 6 weeks.     Multiple Vitamin (MULTI-VITAMINS) TABS Take 1 tablet by mouth     omeprazole (PRILOSEC) 40 MG capsule      oxyCODONE IR (ROXICODONE) 5 MG tablet      pioglitazone (ACTOS) 45 MG tablet      senna-docusate (SENOKOT-S;PERICOLACE) 8.6-50 MG per tablet Take 1 tablet by mouth     tiZANidine (ZANAFLEX) 4 MG tablet Take 4 mg by mouth     triamcinolone (KENALOG) 0.1 % cream      VICTOZA PEN 18 MG/3ML soln      No current facility-administered medications for this visit.              Review of Systems:   A comprehensive 10 point review of systems (constitutional, ENT, cardiac, peripheral vascular, lymphatic, respiratory, GI, , Musculoskeletal, skin, Neurological) was performed and found to be negative except as described in this note.       HOOS Hip Dysfunction & Osteoarthritis Outcome Questionnaire    No flowsheet data found.           [unfilled]    KOOS Knee Survey Assessment    No flowsheet data found.           Promis 10 Assessment    No flowsheet data found.           Ortho Oxford Knee Questionnaire    No flowsheet data found.             See intake form completed by patient

## 2022-06-05 ENCOUNTER — HEALTH MAINTENANCE LETTER (OUTPATIENT)
Age: 72
End: 2022-06-05

## 2022-11-03 NOTE — TELEPHONE ENCOUNTER
-Spoke to the patient over the phone.    -The patient is looking to proceed with a two-stage exchange arthroplasty procedure with Dr. Meyers.    -In order to proceed, Dr. Meyers's chart notes, dated 5/19/22, we need the following items done in preparation:    1.  Clearance from the PAC clinic.  2.  A follow up visit with updated X-rays.  3.  Details re: the patient's existing implant.  (Which was placed by Dr. Rogelio Shi at [place]).    -The patient voiced an understanding of this information and expressed a willingness to proceed.    -Messages have been sent to:    1.  Schedulers: to assist with schedule a PAC consult.  2.  Schedulers: to assist with a Dr. Meyers F/U visit with same day X-rays.  3.  Jannie Dan to assist with finding/ obtaining records on the patient's existing implant.  4.  Kyle Leone to obtain a recent CT scan of the hips, which the patient notes he had done about a week ago at the [facility] in [place].     -Patient will await a call from the PAC & ortho clinic schedulers.    -Writer will await information re: the patient's existing implant & images to be pushed into PACS.    Gypsy Martinez RN  11/3/2022 5:45 PM

## 2022-11-04 NOTE — TELEPHONE ENCOUNTER
FUTURE VISIT INFORMATION      SURGERY INFORMATION:    Date: Risk eval- Meyers- Ortho    RECORDS REQUESTED FROM:       Primary Care Provider: Jv Felix MD - [facility]    Pertinent Medical History: venous insufficiency, JAIR    Most recent EKG+ Tracin22- [facility]    Most recent ECHO: 18- [facility]

## 2022-11-04 NOTE — TELEPHONE ENCOUNTER
Kyle Leone Sarah M, RN Hello, the image is in and ready to be viewed.           Previous Messages     ----- Message -----   From: Gypsy Martinez RN   Sent: 11/3/2022   5:37 PM CDT   To: Kyle Leone   Subject: Outside Images                                   Daniel Wright,     -This patient had a CT scan- hips done about a week ago at [facility] in [facility] contact information: [phone]   -Could you please call over there to obtain the CT scan images and get them pushed into PACS.     Thank you,   Gypsy Martinez, RN

## 2022-11-08 NOTE — PHARMACY - PREOPERATIVE ASSESSMENT CENTER
Preoperative Assessment Center Medication History Note    Medication history completed on November 8, 2022 by this writer. See Epic admission navigator for prior to admission medications. Operating room staff will still need to confirm medications and last dose information on day of surgery.     Medication history interview sources  Patient interview: Yes  Care Everywhere records: Yes  Surescripts pharmacy refill records: Yes    Changes made to PTA medication list  Added: none  Deleted:   -vitamin D - pt reported it's been several years since he took this  -coenzyme Q10 - pt reported it's been several years since he took this  -Flonase - pt reported it's been >1 year since he took this  -hydrocodone-APAP - pt reported it's been ~2 years since he took this  -indomethacin - pt reported it's been several years since he took this  -MVI  -omeprazole - pt stopped ~1 month ago  -oxycodone -pt reported it's been several years since he took this  -senna-docusate - pt reported it's been several years since he took this  -tizanidine - pt reported it's been several years since he took this, due to adverse effects (drowsiness, dry mouth)  Changed: none    Additional medication history information (including reliability of information, actions taken by pharmacist):  -pt manages his own meds and is a reliable historian  -Allergies reviewed, added adverse effects to Tizanidine entry; pt could not elaborate on sulfa drugs childhood reaction  -pt takes amoxicillin 500 mg BID chronically for infected prosthetic hip joint suppression    -- No recent (within 30 days) course of antibiotics  -- No recent (within 30 days) course of systemic steroids  -- Reports  being on blood thinning medications - apixaban 5 mg BID   -- Declines being on any other prescription or over-the-counter medications    Prior to Admission medications    Medication Sig Last Dose Taking? Auth Provider Long Term End Date   acetaminophen (TYLENOL) 325 MG tablet Take  650 mg by mouth 2 times daily Taking Yes Reported, Patient     amoxicillin (AMOXIL) 500 MG capsule Take 500 mg by mouth 2 times daily Taking Yes Reported, Patient     aspirin (ASA) 81 MG EC tablet Take 162 mg by mouth daily Taking Yes Reported, Patient     atorvastatin (LIPITOR) 40 MG tablet Take 40 mg by mouth daily Taking Yes Reported, Patient Yes    ELIQUIS ANTICOAGULANT 5 MG tablet Take 5 mg by mouth 2 times daily Taking Yes Reported, Patient     furosemide (LASIX) 20 MG tablet Take 20 mg by mouth daily Taking Yes Reported, Patient Yes    glipiZIDE (GLUCOTROL XL) 10 MG 24 hr tablet Take 10 mg by mouth daily Taking Yes Reported, Patient Yes    metFORMIN (GLUCOPHAGE XR) 500 MG 24 hr tablet Take 2,000 mg by mouth every morning Taking Yes Reported, Patient Yes    pioglitazone (ACTOS) 45 MG tablet Take 45 mg by mouth daily Taking Yes Reported, Patient Yes    VICTOZA PEN 18 MG/3ML soln Inject 1.8 mg Subcutaneous daily Taking Yes Reported, Patient Yes    BD CEM U/F 32G X 4 MM insulin pen needle    Reported, Patient     blood glucose (ACCU-CHEK DEDRICK PLUS) test strip TEST BLOOD SUGARS 3 TIMES A DAY. E11.622 SKIN ULCER, HIGH A1C   Reported, Patient     blood glucose monitoring (NO BRAND SPECIFIED) meter device kit    Reported, Patient     COMPRESSION STOCKINGS For personal use. Length:calf FARROW WRAPS BILATERALLY   Reported, Patient     Misc. Devices (WALKER AUTO GLIDES) MISC 2 Wheeled Walker for home use. For 6 weeks.  Patient not taking: Reported on 5/19/2022   Reported, Patient          Medication history completed by:   Ahmet Guerrier, PharmD  PAC Pharmacist  [phone]

## 2022-11-09 NOTE — CONSULTS
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 Meyers  Primary Care Physician:  Jv Felix  Waldo Bustos is a 72 year old who was referred to the [facility] for risk evaluation and optimization for possible two stage hip revision surgery in treatment of chronic prosthetic infection and hip pain.     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 C. acnes positive culture. 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 Continues on suppressive therapy. He has consulted with Dr. Meyers and it was recommended that he undergo a two stage exchange arthroplasty. At that time the patient was asked to work on weight loss and improved DIABETES MELLITUS control.     Patient presents to the [facility] for evaluation today. In this interim he has has been working with his family practice provider and has a much improved A1C, last 5.6. He initially lost some weight but has started to gain back again. He has had an evaluation with cardiovascular provider on 5/3/22 for management of his venous insufficiency. From notes:    \"Assessment:  1. Bilateral lower extremity edema with known venous insufficiency, likely multifactorial due to morbid obesity, dependent edema, venous insufficiency, diastolic dysfunction. Since he was initially seen in 2013, his symptoms have remained stable with wearing FarrowWraps and using a venous pump. Over the past couple of years these modalities have not worked as well to keep his edema at bay. He has avoided having any active ulcerations.    We will give a new prescription for FarrowWraps as well as a prescription for 20-30 mmHg, knee-high compression stockings. We will trial increasing Lasix from 20 milligrams daily to 40 milligrams " "daily. Basic metabolic panel in 1 week. I will reach out to our representative for the venous/lymphedema pumps and see if he would contact this patient to help with functionality of his pumps. He is encouraged a low-sodium diet, elevating legs and ambulating. Weight loss is also encouraged.\"    Patient's history is otherwise significant for dyslipidemia, JAIR, umbilical hernia, DIABETES MELLITUS as above, PE/DVT, chronically anticoagulated, CKD, and OA.    History of bleeding/coagulopathy  Chronically anticoagulated    History of anesthesia issues in patient or 1st degree relative  Patient denies history of anesthesia issues. Chart history of difficult intubation but documented easy with a #2 Mack blade and rigid VL in 2019.    History is obtained from the patient.     Past Medical History  Past Medical History:   Diagnosis Date     Arthritis 5 years ago     Chronic osteoarthritis Not sure     Diabetes (H) 10-12 years ago     DVT (deep venous thrombosis) (H)      Dyslipidemia      Gastroesophageal reflux disease      History of blood transfusion      Iliopsoas abscess (H)      Infection of prosthetic hip joint (H)      JAIR (obstructive sleep apnea)      Pulmonary embolism (H)      RA (rheumatoid arthritis) (H) Not sure     Reduced vision 2-3 years ago    Cataract Surgery on both eyes     Venous insufficiency        Past Surgical History  Past Surgical History:   Procedure Laterality Date     Cataract       COLONOSCOPY       EGD       IR IVC FILTER PLACEMENT      removed     [facility] PELVIS/HIP JOINT SURGERY UNLISTED       [facility] STOMACH SURGERY PROCEDURE UNLISTED  1955    2 Hernia surgeries as a child       Prior to Admission Medications  Current Outpatient Medications   Medication Sig Dispense Refill     acetaminophen (TYLENOL) 325 MG tablet Take 650 mg by mouth 2 times daily       amoxicillin (AMOXIL) 500 MG capsule Take 500 mg by mouth 2 times daily       aspirin (ASA) 81 MG EC tablet Take 162 mg by mouth daily       " atorvastatin (LIPITOR) 40 MG tablet Take 40 mg by mouth daily       ELIQUIS ANTICOAGULANT 5 MG tablet Take 5 mg by mouth 2 times daily       furosemide (LASIX) 20 MG tablet Take 20 mg by mouth daily       glipiZIDE (GLUCOTROL XL) 10 MG 24 hr tablet Take 10 mg by mouth daily       metFORMIN (GLUCOPHAGE XR) 500 MG 24 hr tablet Take 2,000 mg by mouth every morning       pioglitazone (ACTOS) 45 MG tablet Take 45 mg by mouth daily       VICTOZA PEN 18 MG/3ML soln Inject 1.8 mg Subcutaneous daily       BD CEM U/F 32G X 4 MM insulin pen needle        blood glucose (ACCU-CHEK DEDRICK PLUS) test strip TEST BLOOD SUGARS 3 TIMES A DAY. E11.622 SKIN ULCER, HIGH A1C       blood glucose monitoring (NO BRAND SPECIFIED) meter device kit        COMPRESSION STOCKINGS For personal use. Length:calf FARROW WRAPS BILATERALLY       Misc. Devices (WALKER AUTO GLIDES) MISC 2 Wheeled Walker for home use. For 6 weeks. (Patient not taking: Reported on 5/19/2022)           Allergies  Allergies   Allergen Reactions     Sulfa Drugs      Other reaction(s): *Unknown - Childhood Rxn     Tizanidine Other (See Comments)     Frequent urination; causes drowsiness, and dry mouth         Social History  Social History     Socioeconomic History     Marital status: Single     Spouse name: Not on file     Number of children: Not on file     Years of education: Not on file     Highest education level: Not on file   Occupational History     Not on file   Tobacco Use     Smoking status: Never     Smokeless tobacco: Never   Substance and Sexual Activity     Alcohol use: No     Drug use: No     Sexual activity: Never   Other Topics Concern     Not on file   Social History Narrative     Not on file     Social Determinants of Health     Financial Resource Strain: Not on file   Food Insecurity: Not on file   Transportation Needs: Not on file   Physical Activity: Not on file   Stress: Not on file   Social Connections: Not on file   Intimate Partner Violence: Not on  file   Housing Stability: Not on file       Family History  Family History   Adopted: Yes   Problem Relation Age of Onset     Diabetes No family hx of      Rheumatoid Arthritis No family hx of      Low Back Problems No family hx of      Unknown/Adopted No family hx of        The complete review of systems is negative other than noted in the HPI or here   Anesthesia Evaluation   Pt has had prior anesthetic. Type: General and MAC (Chart history of difficult intubation, but easy with #2 Mack blade and rigid VL in 2019 and r).    History of anesthetic complications  - difficult airway.      ROS/MED HX  ENT/Pulmonary: Comment: His CPAP is in recall but he is still using    (+) sleep apnea, uses CPAP,  (-) tobacco use and recent URI   Neurologic: Comment: Neck pain   (-) no seizures and no CVA   Cardiovascular: Comment: /68  Venous insufficiency with bilateral leg swelling and some skin breakdown. Using Farrow wraps, and pump.     No vascular procedures    (+) Dyslipidemia -----Previous cardiac testing   Echo: Date: 4/4/22, 2018 Results:    Stress Test: Date: 2013 Results:    ECG Reviewed: Date: 4/1/22 Results:  SR 1st degree AV block  Cath: Date: Results:   (-) taking anticoagulants/antiplatelets, HERRERA and arrhythmias   METS/Exercise Tolerance: 1 - Eating, dressing Comment: Very limited mobility due to hip pain   Hematologic: Comments: History of IVC filter now removed    (+) History of blood clots, pt is anticoagulated, anemia, history of blood transfusion, no previous transfusion reaction,     Musculoskeletal: Comment: Left hip pain  (+) arthritis,     GI/Hepatic:    (-) GERD   Renal/Genitourinary:     (+) renal disease, type: CRI, Pt does not require dialysis,     Endo:     (+) type II DM, Last HgA1c: 5.6, date: 10/3/22, Not using insulin, - not using insulin pump. Normal glucose range:  in am, some lows in PM 76, Obesity,     Psychiatric/Substance Use:  - neg psychiatric ROS  (-) psychiatric history,  "alcohol abuse history and chronic opioid use history   Infectious Disease:  - neg infectious disease ROS     Malignancy:  - neg malignancy ROS     Other:  - neg other ROS    (+) , H/O Chronic Pain,        /68 (BP Location: Right arm, Patient Position: Chair, Cuff Size: Adult Large)   Pulse 84   Temp 98.2  F (36.8  C) (Oral)   Resp 16   Ht 1.778 m (5' 10\")   Wt 146.8 kg (323 lb 9.6 oz)   SpO2 95%   BMI 46.43 kg/m      Physical Exam   Constitutional: Awake, alert, cooperative, no apparent distress, and appears stated age.  Eyes: Pupils equal, round and reactive to light, extra ocular muscles intact, sclera clear, conjunctiva normal.  HENT: Normocephalic, oral pharynx with moist mucus membranes, broken teeth in back. No goiter appreciated.   Respiratory: Clear to auscultation bilaterally, no crackles or wheezing. No cough or obvious dyspnea.  Cardiovascular: Regular rate and rhythm, normal S1 and S2, and no murmur noted. Carotids +2, no bruits. No edema. Palpable pulses to radial  DP and PT arteries. Pedal pulses weaker. Bilateral lower extremity edema, nonpitting.   GI: Normal bowel sounds, soft, non-distended, non-tender, no masses palpated. Moderate size umbilical hernia, soft. Reports stable for a decade.   Lymph/Hematologic: No cervical lymphadenopathy and no supraclavicular lymphadenopathy.  Genitourinary: Deferred.   Skin: Warm and dry.   Musculoskeletal: Limited ROM of neck with head forward positioning. Gross motor strength is normal.    Neurologic: Awake, alert, oriented to name, place and time. Cranial nerves II-XII are grossly intact. Walking with cane.  Neuropsychiatric: Calm, cooperative. Somewhat flat affect.    Labs / testing: (personally reviewed)  OSH 10/27/22  WBC 8.2  Hgb 11.1  hematocrit 35.6  Platelets 343    C reactive protein 0.25  AST 12    10/3/22 A1c 5.6    9/6/22 Ferritin 233.3    EKG: Sinus rhythm with 1st degree AV block    4/4/22 Echo, bubble    Final Conclusion Previous " Study: 9/20/18   Visually Estimated EF: 65-70%   Normal LV size and systolic function with estimated ejection fraction of 65-70%.   No obvious regional wall motion abnormalities.   Mild left ventricular hypertrophy.   The right ventricle is normal in size and function.   Pulmonary artery acceleration time of >100 ms suggests normal PA pressures.   Upper limits of normal IVC size with normal collapsibility on inspiration.     Echocardiogram  2018    Final Impressions:   1. Normal LV size, severely increased wall thickness in a concentric pattern, hyperdynamic global systolic function with an estimated EF of > 75%. No definite regional wall motion abnormalities.   2. Right ventricular cavity size is normal, global systolic RV function is borderline reduced.   3. No significant valve disease detected.   4. Echo contrast was administrered to enhance visualization of all left ventricular segments.    NM Stress test 2013    FINAL CONCLUSIONS   ABNORMAL pharmacologic stress perfusion imaging study.   Images demonstrated a small sized, fixed perfusion abnormality in the inferior wall,  consistent with infarction. However, diaphragm/soft   tissue attenuation artifact may result in a decreased sensitivity to exclude coronary artery  disease.   Normal left ventricular size and systolic function with a calculated LVEF of 71%.   Hypokinetic motion in the inferior wall.   There were no prior studies available for comparison.      10/25/22 CT pelvis     Postoperative changes prior total left hip replacement. There are faint, poorly defined hypodensities within the left iliacus/iliopsoas muscle measuring up to 2.2 cm, which are concerning for small abscesses.    2.  Multifocal soft tissue fluid collections about the left groin, thigh and hip as described measuring up to 11.4 cm in the adductor/groin region.    3.  Nonspecific ground-glass 5.5 cm bone lesion in the left iliac crest has not significantly changed in size from  4/5/2022. 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    4.  Moderate-sized, fat-containing, ventral hernia with scattered areas of stranding and some focal nodularity, similar compared to previous.      CT CAP 4/5/22    1.  Peripherally enhancing lobulated low-attenuation fluid collection involving the left iliopsoas musculature at the junction of the psoas and iliacus. Please see noted images as above. No evidence for gas within this fluid collection. Findings may reflect a benign fluid collection; however, in patient with history of sepsis, an iliopsoas abscess cannot be excluded.    2.  Multiple other fluid collections surround the left BELLA in the subcutaneous tissues over the left hip as well as posterior to the proximal left femur and along the distal iliopsoas extending up to the lesser trochanter. These are not well visualized due to the streak artifact from the left BELLA, but given the patient's history of sepsis, underlying infection in these fluid collections cannot be excluded.    3.  Benign expansile lucent lesion involving the left iliac bone likely a benign entity given the expansile appearance and intact cortex.    4.  Hepatomegaly with surface contour nodularity to the liver suggestive of cirrhosis.    5.  New pulmonary nodules involving the left upper lobe and left lower lobe measuring 4 mm in mean diameter. Continued CT follow-up recommended in 1 year.    6.  Long segment esophageal wall thickening extending from the level of the zuhair to the EG junction could be due to underlying esophagitis.    7.  Enlarged left periaortic lymph node likely reactive, but should undergo continued follow-up.    Outside records reviewed from:  Care Everywhere    Assessment      Waldo Bustos is a 72 year old male seen as a PAC referral for risk assessment and optimization for anesthesia.    Plan/Recommendations  Pt will be optimized for the proposed procedure.  See below for  "details on the assessment, risk, and preoperative recommendations    NEUROLOGY  - No history of TIA, CVA or seizure  - Chronic Pain of left hip. Tylenol.  -Post Op delirium risk factors:  High co-morbid index    ENT  - Patient has previous history of complicated airway.   See above  Mallampati: I  TM: > 3   Thick neck  Limited ROM of neck with head forward positioning  Multiple broken teeth in back    CARDIAC  HLD. Atorvastatin. /68. Venous insufficiency of bilateral lower extremities, followed by cardiovascular provider and managed with Farrow wraps, venous/lymphedema pump, and Lasix daily. ASA 81 mg daily. Denies chest pain, irregular HR or DYSPNEA ON EXERTION. Activity is very limited due to left hip pain. Walks with cane. Available cardiac testing above. EF 65-70%. Abnormal stress test in 2013. Possible artifact.  - METS (Metabolic Equivalents)<4    RCRI: 0.4% risk of serious cardiac events    PULMONARY  Denies asthma, cough or shortness of breath. Reports clearing of throat/phlegm  JAIR with regular use of CPAP. Current CPAP machine is in recall but still using.     - Tobacco History      History   Smoking Status     Never   Smokeless Tobacco     Never       GI: Denies GERD  Umbilical hernia. Reports stable for last decade.   PONV Low Risk  Total Score: 1           1 AN PONV: Patient is not a current smoker        /RENAL  - Baseline Creatinine CKD. Cr 1.36    ENDOCRINE   - BMI: Estimated body mass index is 46.43 kg/m  as calculated from the following:    Height as of this encounter: 1.778 m (5' 10\").    Weight as of this encounter: 146.8 kg (323 lb 9.6 oz).  Class 3 Obesity (BMI > 40)  DIABETES MELLITUS II. A1C 5.6 on oral agents and Victoza. BS range  in am. Has some lows later in day.    HEME: VTE risk 3%  History of PE/DVT several years ago. IVC filter placed and now removed. Chronically anticoagulated on Eliquis. Will need recommendations from provider regarding periop management.     History " of anemia. Recent Hgb 11.1  History of blood transfusion.   Type and screen drawn today for antibody screen    MSK  OA     Patient was discussed with Dr. Kaur who also counseled patient.    The patient is optimized and acceptable candidate for proposed procedure.   Lab update as above. No further diagnostic testing needed.     On the day of service:     Prep time: 20 minutes  Visit time: 20  minutes  Documentation time: 30 minutes  ------------------------------------------  Total time: 70 minutes    JOSEPH Pearson CNS    [facility]  [facility]  Office phone: [phone]  Fax: [phone]

## 2022-11-10 NOTE — TELEPHONE ENCOUNTER
"-In order to proceed, Dr. Meyers's chart notes, dated 5/19/22, we need the following items done in preparation:     1.  Clearance from the PAC clinic. (11/9)  2.  A follow up visit with updated X-rays. (11/14)    3.  Details re: the patient's existing implant.  (Which was placed by Dr. Rogelio Shi at [place]).   (Per Jannie Dan:  Message  Received: Today  Jannie Dan Sarah M, RN    \"Benny Betancur,   I dug further into his chart. His implant records are already in Epic.   When you are in the patient's chart and type in \"implants\" in the search box, you will see the left hip implants that were done.   -Jannie\"    Gypsy Martinez RN  11/10/2022 3:56 PM     "

## 2022-11-14 NOTE — PROGRESS NOTES
[facility]  Orthopaedic Surgery, Joint Replacement Consultation  by Rom Meyers M.D.    Waldo Bustos MRN# [id number]    YOB: 1950     Requesting physician: No ref. provider found  Jv Felix, PCP  MD [facility]  Jem Hickman MD [facility], [place], [place]         Background history:  DX:  1. Morbid obesity Body mass index is 45.2 kg/m .  2. Diabetes mellitus  3. Left BELLA prosthetic joint infection      TREATMENTS:  1. 3/7/2018, left to incision total hip arthroplasty ([facility])  2. 1/23/2019, revision left total hip arthroplasty ([facility]. IV Ceftriaxone x 4 weeks  3. 2/2021, L hip aspiration (CDI)  4. 4/6/2022, C Acnes  5. 4/27/2022, drain placement. C Acnes.    This patient was last seen 6 months ago in May 2022 and advised to undergo two-stage exchange arthroplasty.  Since that time he was hospitalized for anemia further action in regards to his chronic hip infection.  I had recommended proceeding with two-stage extension arthroplasty however the patient was not in favor of doing so.  Therefore, I advised further consultations with Dr. Hickman and Jose Guadalupe and he did not pursue these but spoke with his infectious disease physician and has decided to proceed with a two-stage exchange arthroplasty.  Meanwhile, his chronic drain is discontinued and the drainage catheter into the left hip and iliopsoas region was actually exchanged for a new one.    Examination reveals that he has purulent drainage within the drainage canister from his left hip region.    No new cultures have been obtained since April 2022.    He continues on his amoxacillin daily.      He had a PACs clinic visit and has chronic lymphedema of the lower extremities.  New wraps were prescribed along the venous pump.      Labs:  Fortunately his diabetes is under relative good control with a hemoglobin A1c of 5.6.  ESR 49, elevated  CRP 0.25, normal    CBC pending  Type and screen  negative    Impression:  I had a long discussion with the patient regarding the two-stage exchange arthroplasty, the risk benefits alternatives and expected postoperative recuperation and level of function with hip spacer in place.  He lives with his partner who cannot provide care for him.  He lives in a multistory dwelling as well.  Therefore I expect he will need to be in a transitional care unit for 3 to 6 months postoperatively prior to having second stage reimplantation performed.    Plan:  1. PACs clinic clinic visit completed.  2. Check hemoglobin  3. Culture ongoing drainage from left hip  4. Two-stage exchange arthroplasty will be scheduled.  Case request: Explantation of chronic clearly infected left total hip arthroplasty, possible extended trochanteric osteotomy with extensive debridement and reconstruction using G 20 prosthetic antibiotic cement spacer.  Surgical time 3.5 hours, tier 1.    Rom Meyers MD  [facility] Family Professor  Oncology and Adult Reconstructive Surgery  Dept Orthopaedic Surgery, [facility]  [phone] office, [phone] pager  [email or web address]    Total combined visit time and work time before and after clinic visit = 40 min                     Assessment and Plan:   Assessment:  1. Left BELLA prosthetic joint infection, chronic, Cutibacterium acnes.  Cannot rule out polymicrobial infection.  Currently on suppressive therapy with amoxicillin and drainage catheter.  2. Diabetes mellitus  3. Morbid obesity, BMI 42.44 kg/m        Plan:  I had long conversation with the patient regarding his difficult situation.  Factors contributing to his infection are with his morbid obesity and poorly controlled diabetes.  It appears to me that he had initial infection with a low-grade indolent infection which has never been completely cleared.  This manifest with the C.acnes positive culture.  For this truly represents his offending organism or is a contaminant is difficult to know  with certainty.    I advised that he undergo a two-stage exchange arthroplasty.  I explained the nature of the procedure, the expected outcome and risk benefits alternatives to the procedure.  I explained that a 1 stage exchange arthroplasty would be considered and that Dr. Jem Hickman in general has a preference for this procedure.      I advised that he focus his efforts on tighter control of his diabetes as well as weight reduction over time.  Also advised that he return to see Dr. Rogelio Shi who apparently is unaware of his ongoing infection and also see Dr. Jem Hickman for another opinion if he wishes to consider a 1 stage exchange arthroplasty procedure.    If he wishes to pursue treatment here at [facility], then preparations for two-stage exchange arthroplasty would include obtaining up-to-date x-rays, detailed implant information about the existing implant, preoperative anesthesia clinic evaluation.      MD Vanessa Stephenson Family Professor  Oncology and Adult Reconstructive Surgery  Dept Orthopaedic Surgery, [facility]  [phone] office, [phone] pager  [email or web address]             History of Present Illness:   71 year old male  chief complaint  This gentleman is seen for second opinion consultation in regards to left total hip arthroplasty infection.  History dates back to 2018 when he underwent total hip arthroplasty with Dr. Rogelio Shi and reportedly had problems with an infection afterwards.  I do not have all records available to me or organism involved.  Nonetheless the patient underwent a revision procedure in January 2019 for loosening thought to be secondary to an infection thereby in essence of 1 stage exchange.  He was treated with intravenous ceftriaxone for 4 weeks afterwards.    It is unclear to me whether he was on any additional antibiotic therapy until 2022 when he was having pain and saw Dr. Rogelio Shi in March and a bone scan was performed.  Recommendation  "at that time was to see Dr. Jem Hickman for another opinion.  Meanwhile the patient was having problems with anemia and ended up being admitted to [facility] this past month.  CT scan was performed demonstrating an abscess within his left iliopsoas muscle that communicated with the hip joint.  An aspiration was performed (4/6/2022) and cultures demonstrated QT bacterium bacterium acnes.  I am unable to identify cell count, synovasure or other testing.  A pigtail catheter drainage procedure was performed and he was placed on antibiotic therapy and presently is on amoxicillin.  Patient states he is not on any antibiotic therapy when the aspiration was completed on April 6.    Patient is a known diabetic with poor control (hemoglobin A1c 11.5).  He lives at home with his common-law spouse and ambulates with use of a cane.  6.            Physical Exam:     EXAMINATION pertinent findings:   PSYCH: Pleasant, healthy-appearing, alert, oriented x3, cooperative. Normal mood and affect.  VITAL SIGNS: Height 1.778 m (5' 10\"), weight 142.9 kg (315 lb)..  Reviewed nursing intake notes.   Body mass index is 45.2 kg/m .  RESP: non labored breathing   ABD: benign, soft, non-tender, no acute peritoneal findings  SKIN: grossly normal   LYMPHATIC: grossly normal, no adenopathy, no extremity edema  NEURO: grossly normal , no motor deficits  VASCULAR: satisfactory perfusion of all extremities   MUSCULOSKELETAL:   Left posterior lateral and anterior incisions well-healed.  Minimal erythema.  Abdominal panniculus noted.  Minimal pain with motion of the hip joint.  Patient is ambulatory.  Catheter remains in place.             Data:   All laboratory data reviewed  All imaging studies reviewed by me                 "

## 2022-11-14 NOTE — LETTER
11/14/2022         RE: Waldo Bustos  [address]  [place] [address]        Dear Colleague,    Thank you for referring your patient, Waldo Bustos, to the [facility] [place]. Please see a copy of my visit note below.        [facility]  Orthopaedic Surgery, Joint Replacement Consultation  by Rom Meyers M.D.    Waldo Bustos MRN# [id number]    YOB: 1950     Requesting physician: No ref. provider found  Jv Felix, PCP  Rogelio Shi MD [facility]  Jem Hickman MD [facility], [place], [place]         Background history:  DX:  1. Morbid obesity Body mass index is 45.2 kg/m .  2. Diabetes mellitus  3. Left BELLA prosthetic joint infection      TREATMENTS:  1. 3/7/2018, [facility]  2. 1/23/2019, revision left total hip arthroplasty ([facility]. IV Ceftriaxone x 4 weeks  3. 2/2021, L hip aspiration (CDI)  4. 4/6/2022, C Acnes  5. 4/27/2022, drain placement. C Acnes.    This patient was last seen 6 months ago in May 2022 and advised to undergo two-stage exchange arthroplasty.  Since that time he was hospitalized for anemia further action in regards to his chronic hip infection.  I had recommended proceeding with two-stage extension arthroplasty however the patient was not in favor of doing so.  Therefore, I advised further consultations with Dr. Hickman and Jose Guadalupe and he did not pursue these but spoke with his infectious disease physician and has decided to proceed with a two-stage exchange arthroplasty.  Meanwhile, his chronic drain is discontinued and the drainage catheter into the left hip and iliopsoas region was actually exchanged for a new one.    Examination reveals that he has purulent drainage within the drainage canister from his left hip region.    No new cultures have been obtained since April 2022.    He continues on his amoxacillin daily.      He had a PACs clinic visit and  has chronic lymphedema of the lower extremities.  New wraps were prescribed along the venous pump.      Labs:  Fortunately his diabetes is under relative good control with a hemoglobin A1c of 5.6.  ESR 49, elevated  CRP 0.25, normal    CBC pending  Type and screen negative    Impression:  I had a long discussion with the patient regarding the two-stage exchange arthroplasty, the risk benefits alternatives and expected postoperative recuperation and level of function with hip spacer in place.  He lives with his partner who cannot provide care for him.  He lives in a multistory dwelling as well.  Therefore I expect he will need to be in a transitional care unit for 3 to 6 months postoperatively prior to having second stage reimplantation performed.    Plan:  1. PACs clinic clinic visit completed.  2. Check hemoglobin  3. Culture ongoing drainage from left hip  4. Two-stage exchange arthroplasty will be scheduled.  Case request: Explantation of chronic clearly infected left total hip arthroplasty, possible extended trochanteric osteotomy with extensive debridement and reconstruction using G 20 prosthetic antibiotic cement spacer.  Surgical time 3.5 hours, tier 1.    Rom Meyers MD  [facility] Family Professor  Oncology and Adult Reconstructive Surgery  Dept Orthopaedic Surgery, [facility]  [phone] office, [phone] pager  [email or web address]    Total combined visit time and work time before and after clinic visit = 40 min                     Assessment and Plan:   Assessment:  1. Left BELLA prosthetic joint infection, chronic, Cutibacterium acnes.  Cannot rule out polymicrobial infection.  Currently on suppressive therapy with amoxicillin and drainage catheter.  2. Diabetes mellitus  3. Morbid obesity, BMI 42.44 kg/m        Plan:  I had long conversation with the patient regarding his difficult situation.  Factors contributing to his infection are with his morbid obesity and poorly controlled diabetes.  It  appears to me that he had initial infection with a low-grade indolent infection which has never been completely cleared.  This manifest with the C.acnes positive culture.  For this truly represents his offending organism or is a contaminant is difficult to know with certainty.    I advised that he undergo a two-stage exchange arthroplasty.  I explained the nature of the procedure, the expected outcome and risk benefits alternatives to the procedure.  I explained that a 1 stage exchange arthroplasty would be considered and that Dr. Jem Hickman in general has a preference for this procedure.      I advised that he focus his efforts on tighter control of his diabetes as well as weight reduction over time.  Also advised that he return to see Dr. Rogelio Shi who apparently is unaware of his ongoing infection and also see Dr. Jem Hickman for another opinion if he wishes to consider a 1 stage exchange arthroplasty procedure.    If he wishes to pursue treatment here at [place], then preparations for two-stage exchange arthroplasty would include obtaining up-to-date x-rays, detailed implant information about the existing implant, preoperative anesthesia clinic evaluation.      Rom Meyers MD  [facility] Family Professor  Oncology and Adult Reconstructive Surgery  Dept Orthopaedic Surgery, [facility]  [phone] office, [phone] pager  [email or web address]             History of Present Illness:   71 year old male  chief complaint  This gentleman is seen for second opinion consultation in regards to left total hip arthroplasty infection.  History dates back to 2018 when he underwent total hip arthroplasty with Dr. Rogelio Shi and reportedly had problems with an infection afterwards.  I do not have all records available to me or organism involved.  Nonetheless the patient underwent a revision procedure in January 2019 for loosening thought to be secondary to an infection thereby in essence of 1 stage  "exchange.  He was treated with intravenous ceftriaxone for 4 weeks afterwards.    It is unclear to me whether he was on any additional antibiotic therapy until 2022 when he was having pain and saw Dr. Rogelio Shi in March and a bone scan was performed.  Recommendation at that time was to see Dr. Jem Hickman for another opinion.  Meanwhile the patient was having problems with anemia and ended up being admitted to [facility] this past month.  CT scan was performed demonstrating an abscess within his left iliopsoas muscle that communicated with the hip joint.  An aspiration was performed (4/6/2022) and cultures demonstrated QT bacterium bacterium acnes.  I am unable to identify cell count, synovasure or other testing.  A pigtail catheter drainage procedure was performed and he was placed on antibiotic therapy and presently is on amoxicillin.  Patient states he is not on any antibiotic therapy when the aspiration was completed on April 6.    Patient is a known diabetic with poor control (hemoglobin A1c 11.5).  He lives at home with his common-law spouse and ambulates with use of a cane.  6.            Physical Exam:     EXAMINATION pertinent findings:   PSYCH: Pleasant, healthy-appearing, alert, oriented x3, cooperative. Normal mood and affect.  VITAL SIGNS: Height 1.778 m (5' 10\"), weight 142.9 kg (315 lb)..  Reviewed nursing intake notes.   Body mass index is 45.2 kg/m .  RESP: non labored breathing   ABD: benign, soft, non-tender, no acute peritoneal findings  SKIN: grossly normal   LYMPHATIC: grossly normal, no adenopathy, no extremity edema  NEURO: grossly normal , no motor deficits  VASCULAR: satisfactory perfusion of all extremities   MUSCULOSKELETAL:   Left posterior lateral and anterior incisions well-healed.  Minimal erythema.  Abdominal panniculus noted.  Minimal pain with motion of the hip joint.  Patient is ambulatory.  Catheter remains in place.             Data:   All laboratory data reviewed  All " imaging studies reviewed by me                   Rom Meyers MD

## 2022-11-14 NOTE — NURSING NOTE
"Chief Complaint   Patient presents with     RECHECK     Return for updated XR and to discuss surgery for left hip. Pt states that their hip is getting more painful.       72 year old  1950    Ht 1.778 m (5' 10\")   Wt 142.9 kg (315 lb)   BMI 45.20 kg/m      Past Surgical History:   Procedure Laterality Date     Cataract       COLONOSCOPY       EGD       IR IVC FILTER PLACEMENT      removed     ZC PELVIS/HIP JOINT SURGERY UNLISTED       Z STOMACH SURGERY PROCEDURE UNLISTED  1955    2 Hernia surgeries as a child             Pain Assessment  Patient Currently in Pain: Yes  0-10 Pain Scale: 2  Primary Pain Locat      [id number] - [place], [place] - [address]        Allergies   Allergen Reactions     Sulfa Drugs      Other reaction(s): *Unknown - Childhood Rxn     Tizanidine Other (See Comments)     Frequent urination; causes drowsiness, and dry mouth             Current Outpatient Medications   Medication     acetaminophen (TYLENOL) 325 MG tablet     amoxicillin (AMOXIL) 500 MG capsule     aspirin (ASA) 81 MG EC tablet     atorvastatin (LIPITOR) 40 MG tablet     blood glucose (ACCU-CHEK DEDRICK PLUS) test strip     blood glucose monitoring (NO BRAND SPECIFIED) meter device kit     COMPRESSION STOCKINGS     ELIQUIS ANTICOAGULANT 5 MG tablet     furosemide (LASIX) 20 MG tablet     glipiZIDE (GLUCOTROL XL) 10 MG 24 hr tablet     metFORMIN (GLUCOPHAGE XR) 500 MG 24 hr tablet     pioglitazone (ACTOS) 45 MG tablet     VICTOZA PEN 18 MG/3ML soln     BD CEM U/F 32G X 4 MM insulin pen needle     Misc. Devices (WALKER AUTO GLIDES) MISC     No current facility-administered medications for this visit.             Questionnaires:    HOOS Hip Dysfunction & Osteoarthritis Outcome Questionnaire    Hip Dysfunction & Osteoarthritis Outcome Score (HOOS), English Version LK 2.0 (Tenzin Billy Mannevik E., 2003) 5/19/2022   S1. Do you feel grinding, hear clicking or any " other type of noise from your hip? Never   S2. Difficulties spreading legs wide apart None   S3. Difficulties to stride out when walking Moderate   S4. How severe is your hip joint stiffness after first wakening in the morning? Severe   S5. How severe is your hip stiffness after sitting, lying or resting LATER IN THE DAY? Severe   Symptom Count 5   Symptom Sum 8   Symptom Mean 1.6   Symptom Subscale Score 60   P1. How often is your hip painful? Daily   P2. Straightening your hip fully Moderate   P3. Bending your hip FULLY Moderate   P4. Walking on a flat surface Moderate   P5. Going up or down stairs Moderate   P6. At night while in bed Mild   P7. Sitting or lying Mild   P8. Standing upright Moderate   P9. Walking on a hard surface (asphalt, concrete, etc.) Moderate   P10. Walking on an uneven surface Severe   Pain Count 10   Pain Sum 20   Pain Mean 2   Pain Subscale Score 50   A1. Descending stairs Moderate   A2. Ascending stairs Moderate   A3. Rising from sitting Moderate   A4. Standing Moderate   A5. Bending to the floor/ an object Moderate   A6. Walking on a flat surface Moderate   A7. Getting in/out of car Moderate   A8. Going shopping Severe   A9. Putting on socks/stockings Moderate   A10. Rising from bed Severe   A11. Taking off socks/stockings Moderate   A12. Lying in bed (turning over, maintaining hip position) Moderate   A13. Getting in/out of bed Moderate   A14. Sitting Mild   A15. Getting on/off toilet Moderate   A16. Heavy domestic duties (moving heavy boxes, scrubbing floors, etc.) Extreme   A17. Light domestic duties (cooking, dusting, etc.) Severe   ADL Count 17   ADL Sum 38   ADL Mean 2.24   ADL Subscale Score 44.12   SP1. Squatting Severe   SP2. Running Extreme   SP3. Twisting/pivoting on loaded leg Severe   SP4. Walking on uneven surface Severe   Sports/Rec Count 4   Sports/Rec Sum 13   Sports/Rec Mean 3.25   Sports/Rec Subscale Score 18.75   Q1. How often are you aware of your hip problem?  Constantly   Q2. Have you modified you life style to avoid activities potentially damaging to your hip? Totally   Q3. How much are you troubled with lack of confidence in your hip? Extremely   Q4. In general, how much difficulty do you have with your hip? Extreme   QOL Count 4   QOL Sum 16   QOL Mean 4   Quality of Life Subscale Score 0        Promis 10 Assessment    PROMIS 10 5/19/2022   In general, would you say your health is: Fair   In general, would you say your quality of life is: Fair   In general, how would you rate your physical health? Poor   In general, how would you rate your mental health, including your mood and your ability to think? Fair   In general, how would you rate your satisfaction with your social activities and relationships? Poor   In general, please rate how well you carry out your usual social activities and roles Fair   To what extent are you able to carry out your everyday physical activities such as walking, climbing stairs, carrying groceries, or moving a chair? Moderately   How often have you been bothered by emotional problems such as feeling anxious, depressed or irritable? Sometimes   How would you rate your fatigue on average? Moderate   How would you rate your pain on average?   0 = No Pain  to  10 = Worst Imaginable Pain 5   In general, would you say your health is: 2   In general, would you say your quality of life is: 2   In general, how would you rate your physical health? 1   In general, how would you rate your mental health, including your mood and your ability to think? 2   In general, how would you rate your satisfaction with your social activities and relationships? 1   In general, please rate how well you carry out your usual social activities and roles. (This includes activities at home, at work and in your community, and responsibilities as a parent, child, spouse, employee, friend, etc.) 2   To what extent are you able to carry out your everyday physical activities  such as walking, climbing stairs, carrying groceries, or moving a chair? 3   In the past 7 days, how often have you been bothered by emotional problems such as feeling anxious, depressed, or irritable? 3   In the past 7 days, how would you rate your fatigue on average? 3   In the past 7 days, how would you rate your pain on average, where 0 means no pain, and 10 means worst imaginable pain? 5   Global Mental Health Score 8   Global Physical Health Score 10   PROMIS TOTAL - SUBSCORES 18   Some recent data might be hidden

## 2022-11-15 PROBLEM — M70.72 ILIOPSOAS BURSITIS OF LEFT HIP: Status: ACTIVE | Noted: 2018-08-17

## 2022-11-15 PROBLEM — E78.5 DYSLIPIDEMIA: Status: ACTIVE | Noted: 2022-01-01

## 2022-11-15 PROBLEM — Z96.649 FAILED TOTAL HIP ARTHROPLASTY (H): Status: ACTIVE | Noted: 2019-01-17

## 2022-11-15 PROBLEM — K68.12 ILIOPSOAS ABSCESS (H): Status: ACTIVE | Noted: 2022-04-13

## 2022-11-15 PROBLEM — D72.829 LEUKOCYTOSIS: Status: ACTIVE | Noted: 2022-04-02

## 2022-11-15 PROBLEM — N17.9 AKI (ACUTE KIDNEY INJURY) (H): Status: ACTIVE | Noted: 2022-04-02

## 2022-11-15 PROBLEM — T84.018A FAILED TOTAL HIP ARTHROPLASTY (H): Status: ACTIVE | Noted: 2019-01-17

## 2022-11-15 PROBLEM — D64.9 SYMPTOMATIC ANEMIA: Status: ACTIVE | Noted: 2022-04-02

## 2022-11-15 PROBLEM — E08.319: Status: ACTIVE | Noted: 2020-04-24

## 2022-11-15 PROBLEM — Z79.01 CHRONIC ANTICOAGULATION: Status: ACTIVE | Noted: 2019-01-23

## 2022-11-15 PROBLEM — Z86.711 HISTORY OF PULMONARY EMBOLISM: Status: ACTIVE | Noted: 2019-01-15

## 2022-11-15 NOTE — TELEPHONE ENCOUNTER
-A call was placed to the patient and pre-op teaching was performed over the phone:    Teaching Flowsheet   Relevant Diagnosis: Pre-Op Teaching  Teaching Topic: Explantation of chronic clearly infected left total hip arthroplasty, possible extended trochanteric osteotomy with extensive debridement and reconstruction using G 20 prosthetic antibiotic cement spacer.  Surgical time 3.5 hours, tier 1.        Person(s) involved in teaching:   Patient     Motivation Level:  Asks Questions: Yes  Eager to Learn: Yes  Cooperative: Yes  Receptive (willing/able to accept information): Yes  Any cultural factors/Voodoo beliefs that may influence understanding or compliance? No  Comments:      Patient demonstrates understanding of the following:  Reason for the appointment, diagnosis and treatment plan: Yes  Knowledge of proper use of medications and conditions for which they are ordered (with special attention to potential side effects or drug interactions): Yes  Which situations necessitate calling provider and whom to contact: Yes  What has the patient tried?    Has your symptoms improved?       Teaching Concerns Addressed:   Comments:      Proper use and care of surgical scrub.  (Patient advised to purchase Hibiclens OTC from a local pharmacy).   (medical equip, care aids, etc.): Yes  Nutritional needs and diet plan: Yes  Pain management techniques: Yes  Wound Care: Yes  How and/when to access community resources: Yes     Instructional Materials Used/Given: [facility] surgery scheduler, to mail the patient a surgery packet.     In addition to the information above, the following was also discussed:    -important contact info/ phone numbers  -map/ location of surgery  -medications to avoid  -showering instructions  -stop light tool  -PAC appointment (completed 11/9)  -Covid test (Luke to help schedule 3-4 days before surgery).  -patient did not have a concrete plan in place for who will be driving him home/ staying with him after  surgery.  This information was discussed in-depth with Dr. Meyers at today's visit.    -Next step: [facility] to call the patient to schedule surgery, schedule a covid test, and mail him out a surgery packet.  (Message has already been sent to [facility]).    Gypsy Martinez RN  11/14/2022 6:33 PM          Time spent with patient: 15 minutes.

## 2022-11-15 NOTE — TELEPHONE ENCOUNTER
Phoned patient to get him scheduled for surgery with Dr. Meyers     Informed patient that we are holding surgery date of 11/22 per Dr. Meyers's case requests tier 1.     However,   The patient was unavailable,   Provided call back number and reason in voicemail:   [phone].

## 2022-11-15 NOTE — TELEPHONE ENCOUNTER
FUTURE VISIT INFORMATION      SURGERY INFORMATION:    Date: 22    Location: [facility] or    Surgeon:  Rom Meyers MD    Anesthesia Type:  choice    Procedure: Explantation of chronic clearly infected left total hip arthroplasty, possible extended trochanteric osteotomy with extensive debridement and reconstruction using G 20 prosthetic antibiotic cement spacer    RECORDS REQUESTED FROM:         Primary Care Provider: Jv Felix MD - [facility]     Pertinent Medical History: venous insufficiency, JAIR     Most recent EKG+ Tracin22- [facility]     Most recent ECHO: 18- [facility]

## 2022-11-16 NOTE — TELEPHONE ENCOUNTER
Patient is scheduled for surgery with Dr. Meyers    Spoke with: Rahat    Date of Surgery: 11/22/2022    Location: [facility]    Informed patient they will need an adult  Yes    Pre op with Provider PAC 11/9/22    Pre-procedure COVID-19 Test: 11/18/22    Additional imaging/appointments: POP made    Surgery packet: Mailed    Additional comments: N/A

## 2022-11-16 NOTE — TELEPHONE ENCOUNTER
Phoned patient again to confirm surgery date and get patient scheduled for his prior appts that he needs to complete.     Provided call back number and reason for call.

## 2022-11-20 NOTE — PROGRESS NOTES
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 Morbid obesity Body mass index is 45.2 kg/m .  2. Diabetes mellitus  3. Left BELLA prosthetic joint infection        TREATMENTS:  1. 3/7/2018, [facility]  2. 1/23/2019, revision left total hip arthroplasty ([facility]. IV Ceftriaxone x 4 weeks  3. 2/2021, L hip aspiration ([facility])  4. 4/6/2022, C Acnes  5. 4/27/2022, drain placement. C Acnes.     LEFT HIP IMPLANTS: Smith and Nephew  -Size 58 Cup  -1 screw  -32mm, 20deg high wall liner  -+16 32mm head  ECHELON 15mm high offset stem       SURGERY PLAN (PRE-OP PLAN)  2 SETUPS!     Patient Position (indicated by x):  X  Lateral decubitus, Wixson hip positioner    x  Regular OR table    x  Tran Catheter    x  Revision BELLA drape with plastic side bags   x   Blue U drape x2   Blue and white stockinet   Coban   Ioban      General Equipment Requests (indicated by x):    X  Small pituitary rongeur   X  Meyers's angled curettes, narrow shaft   X  Midas [place]      Phenol 5%      Blunt Pelvic Retractor (.55, Blunt Hohmann with  slight bend)      L[place]otte Osteotomes      Implant Extraction/Cement spacer tools (indicated by x):      Pixateet Ultrasound cement removal   x   Midas Yosvany, AC-1 (1mm tiny dissecting tip with wire guide gold handpiece)   x Mich Set   x Exodus osteotome    x  Flexible osteotomes (both black and wood handled with new replacement blades)   X  Universal stem extractor   X  \"L\" hook & hoop style stem extractor stem extractor (DePuy AML stem extractor)   x Church and Nephew stem extractor (threaded slappy - Richard will bring)   X  Yonatan Explant cup removal osteotomes (36mm ID / 54mm OD)   X  Suction tip with debris trap (clear plastic disposable)   X  Biomet offset implant impactor (white handle in Meyers's cart)   x  Sagittal saw, Recip saw   x  Cables   x  Otis " implant removal tools, [place] trial liner   X  Emmanuelle IM canal long shaft cement removal gouges, pituitary ronguers)      BELLA Requests (indicated by x):  x  G11 BELLA spacer mold (On clean side)    x  4 packs of Palacos cement    x  3g vanco for cement      Biomet Bipolar cup      Yonatan Constrained liner with heads      Church Neph BHR resurfacing BELLA      DePuy S-ROM long stems      Otis Restoration modular long stem      Biomet JEZ long stems, both interlocked and splined stems      Biomet G7 acetabulum cup + augments      Yonatan TM Acetabulum cup      Otis Tritanium TM cup +  augments      [place] GAP II acet cage + cemented poly cup      Biomet OSS prox femur replacement BELLA      Biomet OSS Compress fixation      IM flexible reamers + guidewire, < 9 mm      IM flexible reamers + guidewire, > 9 mm       Allograft: femoral head      Allograft: distal femur      Allograft: proximal tibia      Bone mill      Allograft cancellous chips      Allograft cancellous crushed   X  Dall Verax Biomedical Troch Claw + cable, insertion instruments HOLD   X  Dall Verax Biomedical beaded cerclage cable, green cable  x2 , insertion instruments HOLD      Yonatan CTR Troch cable plate      AO pelvic instruments and clamps (angled) Blunt Hohmann & angled Pelaez, large bone reduction forceps      Specimens and cultures (indicated by x):   X5  Tissue cultures, aerobic and anaerobic without gram stain      Frozen section      pathology specimens - fresh      pathology specimens - formalin         Angelito Garcia MD  Adult Reconstructive Surgery Fellow  Department of Orthopaedic Surgery, [facility]

## 2022-11-21 NOTE — OR NURSING
Simran Holliday RN Cheng, Edward Y, MD; Gypsy Martinez RN; Brittanie Trinh MA; P Pas   I called the patient for his time change and he states he is not sure he can get a ride for this earlier time.  I told him to call you.  He said he will be looking for a ride, but still not sure. Simran THORNTON

## 2022-11-21 NOTE — TELEPHONE ENCOUNTER
"-Spoke to the patient over the phone.    -The patient noted that he is hesitant to proceed with surgery tomorrow, stating that he \"feels like he's on an island, all alone.\"    -The patient states that his brother will be driving him down for surgery with Dr. Meyers tomorrow, but the patient is concerned on getting a ride anywhere post operatively.  The patient states \"I don't want to keep asking my brother to take time off of work.\"    -The patient notes \"I have nobody else to ask to drive me.\"  I don't know why this is \"being asked/ put on me.\"    -The patient was told that this is standard practice with any surgery, that the patient is responsible to finding a ride home from surgery & someone listed in the chart that will be staying with him after surgery as well.    -The patient was open to speaking to our  re: options.    -Will forward this information to Marichuy Morales,  for further assistance.    Gypsy Martinez RN  11/21/2022 12:38 PM     "

## 2022-11-21 NOTE — OR NURSING
"Simran Holliday RN Lyke, Julie A, RN  Is there someone you can think of that can help me with this.  He went to PAC but has been holding all meds for 3 days because someone told him to do that? [facility]  I guess I just want to make sure that is the right thing           Previous Messages     ----- Message -----   From: Simran Holliday RN   Sent: 11/21/2022  11:50 AM CST   To: JOSEPH Pearson, Ahmet Guerrier, [facility]   Subject: Pre- operative medications                       I called this patient for his PAN call as he was seen in the PAC clinic 11/9/22.  I can't find that there are any medication recommendations for DOS.  Patient states that he has been holding all meds for the last 3 days as instructed by \"someone  in the clinic\".  Can you please assist me to determine if that is correct for him?  Thanks,  MARIBEL Cotto          "

## 2022-11-21 NOTE — PROGRESS NOTES
"Social Work Intervention  [facility]    Data/Intervention:    Patient Name:  Waldo Bustos  /Age:  1950 (72 year old)    Visit Type: telephone  Referral Source: Ortho Clinic  Reason for Referral:  Post-op concerns, transportation concerns    Collaborated With:    -Rahat    Psychosocial Information/Concerns:  I was contacted by Ortho RN Gypsy reporting that Rahat would like to speak with SW regarding options for transportation post-op. Gypsy noted that Rahat's brother will be transporting him to surgery but that Rahat reported not wanting to keep asking his brother to take off work to transport him places.     In speaking with Rahat he reported being told by the RN that it is unknown how long he will be in the hospital. Rahat also said \"nobody seems to be able to tell me\" where he will go after surgery or how he will get there.     Rahat reported he lives with his girlfriend who has her own mobility issues and is not able to drive him places and per chart Rahat had reported she would not be able to care for him. Rahat also said he lives in a multi level home (with no bedroom or bathroom on the main floor) and was told by his surgeon that he will not be able to navigate stairs after surgery, should not plan on going home after surgery, and should plan to be in a rehab center for at least 4 months.     Rahat discussed how he is having a two stage hip surgery and that he needs an antibiotic spacer and will be non weightbearing.     Intervention/Education/Resources Provided:  I explained to Rahat that it is true that it is unknown ahead of time how long he will be in the hospital as it depends on how surgery goes and how he is doing afterwards. I also explained that we are not able to predict or plan where he goes after surgery as this too depends on how he is doing, as well as PT/OT evaluations and recs, as Rahat's insurance will need this information in order to decide if they will pay for a TCU stay or " "not. Rahat said this makes sense but doesn't because he contacted his insurance, [facility], the other day and they reviewed his benefits with him, including TCU coverage. I explained that the person was likely reviewing his benefits with him, but that he still has to have a need for a rehab stay for insurance to enact that benefit. I explained that Rahat's insurance will need to see the documentation from the hospital, including the rehab notes to determine this. Rahat discussed having been to a TCU before, [facility], and that they wanted to \"kick me out\" after 8 days because he couldn't bear weight on his leg and had progressed as much as he was going to. Rahat said he then talked with his surgeon, who said he could 50% bear weight, which led to Rahat being able to be in the TCU another week. I explained that insurance looks for a skilled need vs a long term need and explained the difference between the two. I also warned Rahat that his [facility] has a somewhat narrow network of facilities that are in network for TCU so he will be limited in where he can go. I also explained that how long Rahat will be at the TCU will depend on how he is doing with his rehab and I explained that part of the job of the rehab staff at a TCU is to work with people to help them move within new restrictions they have.    In terms of transportation Rahat said he found out from his insurance that they do not cover any type of transportation. I explained that typically, MA is the only insurance that pays for non-emergency medical transportation, such as for hospital discharge to a TCU, so without MA this would be an out of pocket expense. I also explained that if Rahat is at a TCU and needs transportation to an appt, the TCU will likely have a list of transportation companies they work with and can arrange this for him but without MA there would be an out of pocket expense.     Rahat said he is applying for MA today, and is going to do that when we end out phone call. I " explained the general income guidelines for this as well as the option of MA with a spend down, but the county would determine this and eligibility. I explained that MA is the only insurance that would pay for Rahat to be in a rehab facility without a skilled need to be there and also explained that with MA Don would get transportation benefits to and from appointments.     Rahat was receptive to all information provided today and was thankful of my time and denied further SW needs or questions at this time. I informed Rahat that there will be a different SW at the hospital that will help him with discharge planning after surgery.     Assessment/Plan:  I will plan to give a handoff to the inpatient SW tomorrow.     LIMA Siegel, [facility]    [facility]  Ph: [phone], Pgr: [phone]  11/21/2022

## 2022-11-21 NOTE — ANESTHESIA PREPROCEDURE EVALUATION
Anesthesia Pre-Procedure Evaluation    Patient: Waldo Bustos   MRN: [id number] : 1950        Procedure : Procedure(s):  Explantation of Chronic Clearly Infected Left Total Hip Arthroplasty, Possible Extended Trochanteric Osteotomy with Extensive Debridement  and Reconstruction Using G 20 Prosthetic Antibiotic Cement Spacer          Past Medical History:   Diagnosis Date     Arthritis 5 years ago     Chronic osteoarthritis Not sure     Diabetes (H) 10-12 years ago     DVT (deep venous thrombosis) (H)      Dyslipidemia      Gastroesophageal reflux disease      History of blood transfusion      Iliopsoas abscess (H)      Infection of prosthetic hip joint (H)      JAIR (obstructive sleep apnea)      Pulmonary embolism (H)      RA (rheumatoid arthritis) (H) Not sure     Reduced vision 2-3 years ago    Cataract Surgery on both eyes     Venous insufficiency       Past Surgical History:   Procedure Laterality Date     Cataract       COLONOSCOPY       EGD       IR IVC FILTER PLACEMENT      removed     Z PELVIS/HIP JOINT SURGERY UNLISTED       [facility] STOMACH SURGERY PROCEDURE UNLISTED      2 Hernia surgeries as a child      Allergies   Allergen Reactions     Sulfa Drugs      Other reaction(s): *Unknown - Childhood Rxn     Tizanidine Other (See Comments)     Frequent urination; causes drowsiness, and dry mouth        Social History     Tobacco Use     Smoking status: Never     Smokeless tobacco: Never   Substance Use Topics     Alcohol use: No      Wt Readings from Last 1 Encounters:   22 142.9 kg (315 lb)        Anesthesia Evaluation   Pt has had prior anesthetic.         ROS/MED HX  ENT/Pulmonary:     (+) sleep apnea, uses CPAP,  (-) recent URI   Neurologic:  - neg neurologic ROS     Cardiovascular:     (+) Dyslipidemia -----Taking blood thinners Previous cardiac testing   Echo: Date: 2022 Results:  Normal EF no significant valvular disease  Stress Test: Date: Results:    ECG Reviewed: Date:  Results:    Cath: Date: Results:      METS/Exercise Tolerance: 4 - Raking leaves, gardening    Hematologic:     (+) History of blood clots, pt is anticoagulated, anemia,     Musculoskeletal:   (+) arthritis,     GI/Hepatic:     (+) GERD, Asymptomatic on medication,     Renal/Genitourinary:       Endo:     (+) type II DM (victoza and metformin), Last HgA1c: 5.6, date: 10/3/2022, Using insulin, - not using insulin pump. Normal glucose range: 100-150, not previously admitted for DM/DKA. Obesity,     Psychiatric/Substance Use:  - neg psychiatric ROS     Infectious Disease: Comment: Chronically infected L. Hip hardware.      Malignancy:       Other:          ECHO 4/2022   Left Ventricle Normal left ventricular size. Normal left ventricular systolic function. No obvious regional wall motion  abnormalities. Mild left ventricular hypertrophy.    Diastolic Function Normal left ventricular diastolic filling pattern for age.    Right Ventricle The right ventricle is normal in size and function.    Left Atrium The left atrium is normal in size.    Right Atrium The right atrium is normal in size.    Aortic Valve Trileaflet aortic valve. Aortic sclerosis without stenosis. There is no significant aortic regurgitation.    Mitral Valve Structurally normal mitral valve without significant stenosis or prolapse.  There is no significant mitral  regurgitation.    Tricuspid Valve Structurally normal tricuspid valve without significant stenosis.  There is no significant tricuspid regurgitation.    Pulmonic Valve Limited view of the pulmonic valve without significant stenosis. No significant pulmonic regurgitation.    Pulmonary artery acceleration time of >100 ms suggests normal PA pressures.    Pericardium Normal pericardium without effusion.    Aorta The sinuses of Valsalva, sinotubular junction, and ascending aorta appear normal.    Inferior Vena Cava Upper limits of normal IVC size with normal collapsibility on inspiration    Other  None.     Physical Exam    Airway        Mallampati: III   TM distance: > 3 FB   Neck ROM: full   Mouth opening: > 3 cm    Respiratory Devices and Support         Dental  no notable dental history         Cardiovascular   cardiovascular exam normal       Rhythm and rate: regular and normal     Pulmonary   pulmonary exam normal        breath sounds clear to auscultation           OUTSIDE LABS:  CBC:   CBC RESULTS: Recent Labs   Lab Test 11/14/22  1329   WBC 10.8   HGB 9.9*   HCT 31.9*        Last Comprehensive Metabolic Panel:  Recent Labs   Lab Test 11/22/22  0917 11/09/22  1535   NA  --  137   POTASSIUM  --  4.2   CHLORIDE  --  101   CO2  --  25   ANIONGAP  --  11   BUN  --  33.7*   CR  --  1.36*   GFRESTIMATED  --  56*   * 103*   MAIKOL  --  8.8        Lab Results   Component Value Date    WBC 10.8 11/14/2022    HGB 9.9 (L) 11/14/2022    HCT 31.9 (L) 11/14/2022     11/14/2022     BMP:   Lab Results   Component Value Date     11/09/2022    POTASSIUM 4.2 11/09/2022    CHLORIDE 101 11/09/2022    CO2 25 11/09/2022    BUN 33.7 (H) 11/09/2022    CR 1.36 (H) 11/09/2022     (H) 11/09/2022     COAGS: No results found for: PTT, INR, FIBR  POC: No results found for: BGM, HCG, HCGS  HEPATIC: No results found for: ALBUMIN, PROTTOTAL, ALT, AST, GGT, ALKPHOS, BILITOTAL, BILIDIRECT, ANDREA  OTHER:   Lab Results   Component Value Date    MAIKOL 8.8 11/09/2022       Anesthesia Plan    ASA Status:  3      Anesthesia Type: General.     - Airway: ETT   Induction: Intravenous.   Maintenance: Balanced.   Techniques and Equipment:     - Airway: Video-Laryngoscope     - Lines/Monitors: BIS, 2nd IV     Consents    Anesthesia Plan(s) and associated risks, benefits, and realistic alternatives discussed. Questions answered and patient/representative(s) expressed understanding.    - Discussed:     - Discussed with:  Patient      - Extended Intubation/Ventilatory Support Discussed: No.      - Patient is DNR/DNI  Status: No    Use of blood products discussed: Yes.     Postoperative Care    Pain management: IV analgesics, Oral pain medications, Multi-modal analgesia.   PONV prophylaxis: Ondansetron (or other 5HT-3)     Comments:    Other Comments: Explantation of Chronic Clearly Infected Left Total Hip Arthroplasty, Possible Extended Trochanteric Osteotomy with Extensive Debridement (Left: Hip) 11/22/22  PMH: JAIR CPAP HLD DM2 victoza metformin A1C 5.6 10/3/22            Gualberto Crystal MD   I have reviewed the chart with the resident.  I have examined the patient.  I agree with above exam, assessment, and plan as documented by the resident.    Sahvonne Rosa MD

## 2022-11-22 NOTE — LETTER
Transition Communication Hand-off for Care Transitions to Next Level of Care Provider    Name: Waldo Bustos  : 1950  MRN #: [id number]  Primary Care Provider: YUE MEDLEY     Primary Clinic: [address] [place] [address]     Reason for Hospitalization:  Prosthetic joint infection, initial encounter (H) [T84.50XA]  Admit Date/Time: 2022  8:23 AM  Discharge Date: 22  Payor Source: Payor: [employer] / Plan: [employer] MEDICARE ADVANTAGE / Product Type: HMO /          Reason for Communication Hand-off Referral: Other Contiuum of Care    Discharge Plan: discharge to  TCU       Concern for non-adherence with plan of care:   Y/N N  Discharge Needs Assessment:  Needs    Flowsheet Row Most Recent Value   Equipment Currently Used at Home cane, rolando   PAS Number [id number]            Follow-up plan:    Future Appointments   Date Time Provider Department Center   2022 10:15 AM TCU OT WAITLIST [facility]   2022  2:30 PM UR PT REHAB TECH URPT [place]   2022  3:00 PM TR TC PT 1 [facility]PT [facility]   2022  7:00 PM UR OT WAITLIST UROT [place]   2022  1:30 PM Ramez Cronin MD [facility]       Any outstanding tests or procedures:        Referrals     Future Labs/Procedures    Occupational Therapy Adult Consult     Comments:    Evaluate and treat as clinically indicated.    Reason:s/p left hip surgery    Physical Therapy Adult Consult     Comments:    Evaluate and treat as clinically indicated.    Reason:  s/p left hip surgery              JO ANN Cardoso  [facility]   [facility]  Phone: [phone]

## 2022-11-22 NOTE — ANESTHESIA PROCEDURE NOTES
Airway       Patient location during procedure: OR       Procedure Start/Stop Times: 11/22/2022 12:28 PM  Staff -        Anesthesiologist:  Shavonne Rosa MD       Resident/Fellow: Gualberto Crystal MD       Performed By: resident  Consent for Airway        Urgency: elective  Indications and Patient Condition       Indications for airway management: caridad-procedural       Induction type:intravenous       Mask difficulty assessment: 2 - vent by mask + OA or adjuvant +/- NMBA    Final Airway Details       Final airway type: endotracheal airway       Successful airway: ETT - single  Endotracheal Airway Details        ETT size (mm): 7.5       Cuffed: yes       Successful intubation technique: direct laryngoscopy       DL Blade Type: MAC 4       Grade View of Cords: 2       Adjucts: stylet       Position: Right       Measured from: gums/teeth       Secured at (cm): 23       Bite block used: None    Post intubation assessment        Placement verified by: capnometry and equal breath sounds        Number of attempts at approach: 1       Number of other approaches attempted: 0       Secured with: plastic tape       Ease of procedure: easy       Dentition: Intact and Unchanged    Medication(s) Administered   Medication Administration Time: 11/22/2022 12:28 PM

## 2022-11-22 NOTE — OR NURSING
Called into OR to notify Dr. Meyers that eliquis was stopped on Thursday 11/17. Dr. Meyers aware and ok to proceed. Informed of alert in Epic on celebrex interacting-per Dr. Meyers to hold.

## 2022-11-23 NOTE — PROGRESS NOTES
"Orthopedic Surgery Progress Note: 11/23/2022    Subjective:   No acute events overnight. Pain well-controlled. Tolerating a diet. No new concerns or complaints. Denies SOB, chest Pain, fevers, chills.     Objective:   BP (!) 145/52 (BP Location: Right arm)   Pulse 80   Temp 98  F (36.7  C) (Oral)   Resp 16   Ht 1.778 m (5' 10\")   Wt 147.1 kg (324 lb 4.8 oz)   SpO2 94%   BMI 46.53 kg/m    I/O this shift:  In: 320 [P.O.:120; I.V.:200]  Out: 600 [Urine:500; Drains:100]  General: NAD. Resting comfortably in bed.  Respiratory: Breathing comfortably on RA.  Musculoskeletal:    Focused Exam of Left Lower Extremity  Dressing C/D/I.   Fires EHL, FHL, TA, GSC, Quad  SILT DP, SP, Saph, Sural, Tibial Nerve Distributions  1+ DP pulse, foot WWP. 1+ edema leg.     Output by Drain (mL) 11/21/22 0700 - 11/21/22 1459 11/21/22 1500 - 11/21/22 2259 11/21/22 2300 - 11/22/22 0659 11/22/22 0700 - 11/22/22 1459 11/22/22 1500 - 11/22/22 2259 11/22/22 2300 - 11/23/22 0622   Closed/Suction Drain 1 Left Hip Bulb 15 French      100         Laboratory Data:  Lab Results   Component Value Date    WBC 10.8 11/14/2022    HGB 7.6 (L) 11/22/2022     11/14/2022         Assessment & Plan:   Waldo Bustos is a 72 year old male s/p Explantation of Chronic  Infected Left Total Hip Arthroplasty, Extended Trochanteric Osteotomy with Extensive Debridement and Reconstruction Using Antibiotic Cement Spacer on 11/22/2022 with Dr. Meyers.     11/23: Acute blood loss anemia, with recent surgery.  1 unit PRBC ordered.  - Medicine consultation  - Infectious disease consultation  Follow hemoglobin hemoglobin closely-approximately 1000 cc blood loss at surgery on 11/22  -Drain to remain in place  - Hold Eliquis today, in the setting of acute blood loss anemia    Activity: Up with assist and assistive devices as needed until independent.  Weight bearing status: NWB LLE  Antibiotics: Vancomycin, Zosyn until intraoperative cultures have resulted  Diet: " Begin with clear fluids and progress diet as tolerated. Bowel regimen. Anti-emetics PRN.    DVT prophylaxis: Mechanical while in hospital with To be determined on POD1  Elevation: Elevate heels off of bed on pillows   Wound Care: Alginate, tegaderm to be changed PRN by ortho  Drains: 15f GUERRERO drain  Tran: Remove POD1  Pain management: Orals PRN, IV for breakthrough only  X-rays: AP Pelvis and lateral in PACU  Physical Therapy: Mobilization, ROM, ADL's, Posterior hip precautions  Occupational Therapy: ADL's  Labs: Trend Hgb on POD #1, 2  Consults: PT, OT. Hospitalist, appreciate assistance in caring for this patient throughout the perioperative period-     Disposition: Pending progress with therapies, pain control on orals, and medical stability; anticipate discharge to likely TCU on POD3-5.    Orthopedic surgery staff for this patient is Dr. Meyers.    ------------------------------------------------------------------------------------------    Damian Frank MD  Orthopedic Surgery PGY4  [phone]      FOLLOWUP:    Future Appointments   Date Time Provider Department Center   11/23/2022  8:15 AM Aisha Prakash Pt, PT [facility] [place]   11/23/2022 11:15 AM Wendy Betancur OT [facility]   12/12/2022 11:45 AM [email or web address]

## 2022-11-23 NOTE — ANESTHESIA CARE TRANSFER NOTE
Patient: Waldo Bustos    Procedure: Procedure(s):  Explantation of Chronic  Infected Left Total Hip Arthroplasty, Extended Trochanteric Osteotomy with Extensive Debridement  and Reconstruction Using G 20 Prosthetic Antibiotic Cement Spacer       Diagnosis: Prosthetic joint infection, initial encounter (H) [T84.50XA]  Diagnosis Additional Information: No value filed.    Anesthesia Type:   General     Note:    Oropharynx: oral airway in place  Level of Consciousness: awake  Oxygen Supplementation: face mask  Level of Supplemental Oxygen (L/min / FiO2): 8  Independent Airway: airway patency not satisfactory and stable  Dentition: dentition unchanged  Vital Signs Stable: post-procedure vital signs reviewed and stable  Report to RN Given: handoff report given  Patient transferred to: PACU    Handoff Report: Identifed the Patient, Identified the Reponsible Provider, Reviewed the pertinent medical history, Discussed the surgical course, Reviewed Intra-OP anesthesia mangement and issues during anesthesia, Set expectations for post-procedure period and Allowed opportunity for questions and acknowledgement of understanding      Vitals:  Vitals Value Taken Time   /54 11/22/22 2054   Temp 37.1  C (98.8  F) 11/22/22 2053   Pulse 83 11/22/22 2058   Resp 13 11/22/22 2058   SpO2 98 % 11/22/22 2058   Vitals shown include unvalidated device data.    Electronically Signed By: JOSEPH Gonzalez CRNA  November 22, 2022  8:59 PM

## 2022-11-23 NOTE — PHARMACY
Prescriber Notification Note    The pharmacist has communicated with this patient's provider regarding a concern or therapy recommendation.    Notified Person: Samantha JEAN  Date/Time of Notification: 11/23@2847  Interaction: phone  Concern/Recommendation: Apixaban is ordered. Pt's Hgb=6.8 today. Want to clarify if apixaban should be given.     Comments/Additional Details: TORB received to hold Apixaban.

## 2022-11-23 NOTE — PROGRESS NOTES
Patient seen and examined.  Discussed care with patient, RN during care rounds.  He is currently receiving 1 unit of PRBC for anemia of acute blood loss.  Had an episode of low oxygen saturation, refusing capnography overnight but now within capnography.  On 5 L of oxygen via mask, used CPAP at bedtime with oxygen.  Getting vancomycin.  Pharmacy consulted.      Lab Results   Component Value Date    WBC 10.8 11/14/2022     Lab Results   Component Value Date    RBC 3.39 11/14/2022     Lab Results   Component Value Date    HGB 6.8 11/23/2022     Lab Results   Component Value Date    HCT 31.9 11/14/2022     No components found for: MCT  Lab Results   Component Value Date    MCV 94 11/14/2022     Lab Results   Component Value Date    MCH 29.2 11/14/2022     Lab Results   Component Value Date    MCHC 31.0 11/14/2022     Lab Results   Component Value Date    RDW 13.7 11/14/2022     Lab Results   Component Value Date     11/14/2022       Last Comprehensive Metabolic Panel:  Sodium   Date Value Ref Range Status   11/23/2022 137 133 - 144 mmol/L Final     Potassium   Date Value Ref Range Status   11/23/2022 5.8 (H) 3.4 - 5.3 mmol/L Final     Chloride   Date Value Ref Range Status   11/23/2022 106 94 - 109 mmol/L Final     Carbon Dioxide (CO2)   Date Value Ref Range Status   11/23/2022 28 20 - 32 mmol/L Final     Anion Gap   Date Value Ref Range Status   11/23/2022 3 3 - 14 mmol/L Final     Glucose   Date Value Ref Range Status   11/23/2022 258 (H) 70 - 99 mg/dL Final     GLUCOSE BY METER POCT   Date Value Ref Range Status   11/23/2022 302 (H) 70 - 99 mg/dL Final     Urea Nitrogen   Date Value Ref Range Status   11/23/2022 30 7 - 30 mg/dL Final     Creatinine   Date Value Ref Range Status   11/23/2022 1.29 (H) 0.66 - 1.25 mg/dL Final     GFR Estimate   Date Value Ref Range Status   11/23/2022 59 (L) >60 mL/min/1.73m2 Final     Comment:     Effective December 21, 2021 eGFRcr in adults is calculated using the 2021 CKD-EPI  creatinine equation which includes age and gender (Filippo milton al., [facility], DOI: 10.1056/TBPNeb6961223)     Calcium   Date Value Ref Range Status   11/23/2022 7.6 (L) 8.5 - 10.1 mg/dL Final       Current Facility-Administered Medications:      [START ON 11/25/2022] acetaminophen (TYLENOL) tablet 650 mg, 650 mg, Oral, Q4H PRN, Damian Frank MD     acetaminophen (TYLENOL) tablet 975 mg, 975 mg, Oral, Q8H, Damian Frank MD, 975 mg at 11/23/22 0704     apixaban ANTICOAGULANT (ELIQUIS) tablet 2.5 mg, 2.5 mg, Oral, HOLD **FOLLOWED BY** [START ON 12/7/2022] apixaban ANTICOAGULANT (ELIQUIS) tablet 5 mg, 5 mg, Oral, HOLD, Jessica Hodge APRN CNS     atorvastatin (LIPITOR) tablet 40 mg, 40 mg, Oral, Daily, Damian Frank MD, 40 mg at 11/23/22 0806     bisacodyl (DULCOLAX) suppository 10 mg, 10 mg, Rectal, Daily PRN, Damian Frank MD     glucose gel 15-30 g, 15-30 g, Oral, Q15 Min PRN **OR** dextrose 50 % injection 25-50 mL, 25-50 mL, Intravenous, Q15 Min PRN **OR** glucagon injection 1 mg, 1 mg, Subcutaneous, Q15 Min PRN, Shelby Thornton MD     HYDROmorphone (DILAUDID) injection 0.2 mg, 0.2 mg, Intravenous, Q2H PRN, 0.2 mg at 11/22/22 2153 **OR** HYDROmorphone (DILAUDID) injection 0.4 mg, 0.4 mg, Intravenous, Q2H PRN, Damian Frank MD     hydrOXYzine (ATARAX) tablet 10 mg, 10 mg, Oral, Q6H PRN, Damian Frank MD     insulin aspart (NovoLOG) injection (RAPID ACTING), 1-7 Units, Subcutaneous, TID AC, Shelby Thornton MD, 4 Units at 11/23/22 0959     insulin aspart (NovoLOG) injection (RAPID ACTING), 1-5 Units, Subcutaneous, At Bedtime, Shelby Thornton MD     lactated ringers infusion, , Intravenous, Continuous, Damian Frank MD, Last Rate: 50 mL/hr at 11/23/22 0051, New Bag at 11/23/22 0051     lidocaine (LMX4) cream, , Topical, Q1H PRN, Damian Frank MD     lidocaine 1 % 0.1-1 mL, 0.1-1 mL, Other, Q1H PRN, Damian Frank MD     magnesium hydroxide (MILK OF MAGNESIA)  suspension 30 mL, 30 mL, Oral, Daily PRN, Damian Frank MD     methocarbamol (ROBAXIN) tablet 500 mg, 500 mg, Oral, Q6H PRN, Damian Frank MD     naloxone (NARCAN) injection 0.2 mg, 0.2 mg, Intravenous, Q2 Min PRN **OR** naloxone (NARCAN) injection 0.4 mg, 0.4 mg, Intravenous, Q2 Min PRN **OR** naloxone (NARCAN) injection 0.2 mg, 0.2 mg, Intramuscular, Q2 Min PRN **OR** naloxone (NARCAN) injection 0.4 mg, 0.4 mg, Intramuscular, Q2 Min PRN, Damian Frank MD     ondansetron (ZOFRAN ODT) ODT tab 4 mg, 4 mg, Oral, Q6H PRN **OR** ondansetron (ZOFRAN) injection 4 mg, 4 mg, Intravenous, Q6H PRN, Damian Frank MD     oxyCODONE (ROXICODONE) tablet 5 mg, 5 mg, Oral, Q4H PRN, 5 mg at 11/23/22 0704 **OR** oxyCODONE IR (ROXICODONE) tablet 10 mg, 10 mg, Oral, Q4H PRN, Damian Frank MD     piperacillin-tazobactam (ZOSYN) 3.375 g vial to attach to  mL bag, 3.375 g, Intravenous, Q6H, Damian Frank MD, 3.375 g at 11/23/22 0806     polyethylene glycol (MIRALAX) Packet 17 g, 17 g, Oral, Daily, Damian Frank MD, 17 g at 11/23/22 0806     prochlorperazine (COMPAZINE) injection 5 mg, 5 mg, Intravenous, Q6H PRN **OR** prochlorperazine (COMPAZINE) tablet 5 mg, 5 mg, Oral, Q6H PRN, Damian Frank MD     senna-docusate (SENOKOT-S/PERICOLACE) 8.6-50 MG per tablet 1 tablet, 1 tablet, Oral, BID, Damian Frank MD, 1 tablet at 11/23/22 0806     sodium chloride (PF) 0.9% PF flush 3 mL, 3 mL, Intracatheter, Q8H, Damian Frank MD     sodium chloride (PF) 0.9% PF flush 3 mL, 3 mL, Intracatheter, q1 min prn, Damian Frank MD     sodium chloride 0.9% infusion, , Intravenous, Continuous, Shelby Thornton MD, Last Rate: 50 mL/hr at 11/23/22 0305, New Bag at 11/23/22 0305     [START ON 11/24/2022] vancomycin (VANCOCIN) 1,500 mg in 0.9% NaCl 250 mL intermittent infusion, 1,500 mg, Intravenous, Q24H, Rom Meyers MD

## 2022-11-23 NOTE — PHARMACY-ADMISSION MEDICATION HISTORY
Medication history was completed pre-operatively. Please see pharmacy note on 11/9/22 for details. Patient denies any home medication changes.

## 2022-11-23 NOTE — ANESTHESIA POSTPROCEDURE EVALUATION
Patient: Waldo Bustos    Procedure: Procedure(s):  Explantation of Chronic  Infected Left Total Hip Arthroplasty, Extended Trochanteric Osteotomy with Extensive Debridement  and Reconstruction Using G 20 Prosthetic Antibiotic Cement Spacer       Anesthesia Type:  General    Note:  Disposition: Inpatient   Postop Pain Control: Uneventful            Sign Out: Well controlled pain   PONV: No   Neuro/Psych: Uneventful            Sign Out: Acceptable/Baseline neuro status   Airway/Respiratory: Uneventful            Sign Out: Acceptable/Baseline resp. status   CV/Hemodynamics: Uneventful            Sign Out: Acceptable CV status; No obvious hypovolemia; No obvious fluid overload   Other NRE: NONE   DID A NON-ROUTINE EVENT OCCUR? No           Last vitals:  Vitals Value Taken Time   /55 11/22/22 2315   Temp 37  C (98.6  F) 11/22/22 2300   Pulse 79 11/22/22 2315   Resp 16 11/22/22 2315   SpO2 98 % 11/22/22 2329   Vitals shown include unvalidated device data.    Electronically Signed By: Rhys Porter MD  November 22, 2022  11:35 PM

## 2022-11-23 NOTE — PROVIDER NOTIFICATION
Patient has home cpap with. patient keeps desatting to 83-84%. RT department does not have an adaptor that complies with that particular type of cpap. Patient currently wears cpap of 16, and is wearing nasal pillows. patient is a mouth breather.   Could switch out to V60 - usnure if patient would tolerate mask. Assuming desat is due to pain from post op. Oxymask is recommended if patient continues to desat.     Currently on cpap 16, with blow by O2 to keep sats up. Would need V60 order from provider.

## 2022-11-23 NOTE — CONSULTS
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 Internal Medicine was consulted for post-operative medical management.     Chronically infected left total hip arthroplasty now s/p explantation + extended debridement/reconstruction with antibiotic cement spacer (11/22/22)  -Antibiotics per primary team and ID- presently on vanc, pip/tazo  -Post-op pain control per primary team  -Drain management per primary team  -follow-up cultures    Acute hypoxic respiratory failure- suspect secondary to atelectasis, hypoventilation in setting of JAIR. Was going to be on home CPAP but had desaturations and was RT unable to bleed oxygen into the tubing  -incentive spirometry, monitor respiratory rate with continuous oximetry  -oxymask as needed    JAIR with obesity- home CPAP- see above    DM2- PTA med list includes metformin, glipizide, pioglitazone, victoza  -hold home meds presently  -sliding scale insulin TID with meals and at bedtime but in the days just prior to discharge, could consider resuming a few of his home diabetes meds  -A1c (none on file in last 90 days)  -may be able to add some glargine once insulin needs more delineated    CAD- continue statin, hold aspirin until cleared by surgery team    History of DVT/PE- previously on DOAC (eliquis) but this was held perioperatively-- defer to ortho re: resumption timing    Venous insufficiency with signs of stasis dermatitis  Erythematous patches on bilateral lower  "extremities - left anterior lower leg and smaller area on right lower leg  Low suspicion for cellulitis and more consistent with chronic stasis dermatitis  - PTA med list includes furosemide - could resume in 1-2 days if BP and renal function allow  -compression stockings    Normocytic anemia (?anemia of chronic disease) with acute blood loss anemia- pre-operatively had hgb of 9.9 on 11/14. 11/22 was 9.6-9.2. Intra-op trended down to 7 so he was transfused intra-operatively.   -Trend in AM  -transfuse if <7  -Consider outpatient iron studies    Respiratory acidosis-- intra-operative blood gases with high CO2 and low pH. Will monitor closely with post-op capnography if able  Hypocalcemia- will check ionized calcium in AM- could be due to blood products or could be due to low albumin  -recheck in AM  Hyperkalemia- mild and intermittent on the intra-op labs. Asymptomatic  -recheck in AM.        The patient's care was discussed with the Bedside Nurse and Patient.    Shelby Thornton MD  [facility]  Securely message with the Vocera Web Console (learn more here)  Text page via [facility] Paging/Directory       Hospitalist Service    Clinically Significant Risk Factors Present on Admission          # Hypocalcemia: Lowest iCa = 4.3 mg/dL (Ref range: 4.4-5.2 mg/dL) in last 2 days, will monitor and replace as appropriate      # Drug Induced Coagulation Defect: home medication list includes an anticoagulant medication      # Non-Invasive mechanical ventilation: current O2 Device: BiPAP/CPAP  # Acute hypoxic respiratory failure: continue supplemental O2 as needed    # Severe Obesity: Estimated body mass index is 46.53 kg/m  as calculated from the following:    Height as of this encounter: 1.778 m (5' 10\").    Weight as of this encounter: 147.1 kg (324 lb 4.8 oz).           ______________________________________________________________________    Chief Complaint   \"I have had a drain in " "for 5 months\"    History is obtained from the patient and the medical record     History of Present Illness   Waldo Bustos is a 72 year old male with past medical history of osteoarthritis (possibly rheumatoid arthritis- patient stated self-diagnosed/not on meds for it), DM2, DVT/PE (3-4 years ago, on chronic DOAC) obesity, JAIR who was admitted post-operatively after explantation of chronically infected left total hip arthroplasty + extended debridement/reconstruction using antibiotic cement spacer 11/22/22. Internal Medicine was consulted for post-operative medical management.     His hip was originally replaced in 2018 and then he has had revisions/replacements since then. He has had a drain in place for the past 5 months. He takes tylenol every day for pain and was able to ambulate with a cane prior to surgery. Presently he denies chest pain, shortness of breath, abdominal pain. Does have a little bit of numbness/tingling in the hands. No history of constipation. Has h/o occasional diarrhea.     Review of Systems   The 10 point Review of Systems is negative other than noted in the HPI or here.     Past Medical History    I have reviewed this patient's medical history and updated it with pertinent information if needed.   Past Medical History:   Diagnosis Date     Arthritis 5 years ago     Chronic osteoarthritis Not sure     Diabetes (H) 10-12 years ago     DVT (deep venous thrombosis) (H)      Dyslipidemia      Gastroesophageal reflux disease      History of blood transfusion      Iliopsoas abscess (H)      Infection of prosthetic hip joint (H)      JAIR (obstructive sleep apnea)      Pulmonary embolism (H)      RA (rheumatoid arthritis) (H) Not sure     Reduced vision 2-3 years ago    Cataract Surgery on both eyes     Venous insufficiency      Patient reported the rheumatoid arthritis might be \"self- diagnosed.\" Says he doesn't take any meds for it.   Denies h/o heart failure    Past Surgical History   I " have reviewed this patient's surgical history and updated it with pertinent information if needed.  Past Surgical History:   Procedure Laterality Date     Cataract       COLONOSCOPY       EGD       IR IVC FILTER PLACEMENT      removed     [facility] PELVIS/HIP JOINT SURGERY UNLISTED       [facility] STOMACH SURGERY PROCEDURE UNLISTED  1954    2 Hernia surgeries as a child       Social History   I have reviewed this patient's social history and updated it with pertinent information if needed.  Social History     Tobacco Use     Smoking status: Never     Smokeless tobacco: Never   Substance Use Topics     Alcohol use: No     Drug use: No   Lives with his girlfriend. Verified no prior alcohol use nor smoking. Previously worked at a bar for many years.      Family History   Unable to obtain due to: patient was adopted and doesn't know his family history    Medications   I have reviewed this patient's current medications  Medications Prior to Admission   Medication Sig Dispense Refill Last Dose     acetaminophen (TYLENOL) 325 MG tablet Take 650 mg by mouth 2 times daily   Past Week     amoxicillin (AMOXIL) 500 MG capsule Take 500 mg by mouth 2 times daily   Past Week     aspirin (ASA) 81 MG EC tablet Take 162 mg by mouth daily   11/18/2022     atorvastatin (LIPITOR) 40 MG tablet Take 40 mg by mouth daily   11/18/2022     BD CEM U/F 32G X 4 MM insulin pen needle  (Patient not taking: Reported on 11/14/2022)        blood glucose (ACCU-CHEK DEDRICK PLUS) test strip TEST BLOOD SUGARS 3 TIMES A DAY. E11.622 SKIN ULCER, HIGH A1C        blood glucose monitoring (NO BRAND SPECIFIED) meter device kit         COMPRESSION STOCKINGS For personal use. Length:calf FARROW WRAPS BILATERALLY        ELIQUIS ANTICOAGULANT 5 MG tablet Take 5 mg by mouth 2 times daily   11/18/2022     furosemide (LASIX) 20 MG tablet Take 20 mg by mouth daily   11/18/2022     glipiZIDE (GLUCOTROL XL) 10 MG 24 hr tablet Take 10 mg by mouth daily   11/18/2022     metFORMIN  (GLUCOPHAGE XR) 500 MG 24 hr tablet Take 2,000 mg by mouth every morning   11/18/2022     Misc. Devices (WALKER AUTO GLIDES) MISC 2 Wheeled Walker for home use. For 6 weeks. (Patient not taking: Reported on 5/19/2022)        pioglitazone (ACTOS) 45 MG tablet Take 45 mg by mouth daily   11/18/2022     VICTOZA PEN 18 MG/3ML soln Inject 1.8 mg Subcutaneous daily   11/18/2022       Allergies   Allergies   Allergen Reactions     Sulfa Drugs      Other reaction(s): *Unknown - Childhood Rxn     Tizanidine Other (See Comments)     Frequent urination; causes drowsiness, and dry mouth         Physical Exam   Vital Signs: Temp: 98  F (36.7  C) Temp src: Oral BP: 133/41 Pulse: 87   Resp: 16 SpO2: (!) 84 % (sats between 83-85 CPAP on RA, RT informed) O2 Device: BiPAP/CPAP Oxygen Delivery: 2 LPM  Weight: 324 lbs 4.75 oz  Constitutional: Somnolent, has oxymask in place. Wakes to voice but slow to answer questions and generally keeps eyes closed  Head: Normocephalic. Atraumatic.   ENT: Nares patent, mildly dry mucous membranes. Oxymask present.   Neck: No obvious asymmetry. No thyromegaly.   Cardiovascular: mildly elevated heart rate and regular rhythm. No murmurs, clicks, gallops or rubs. 1+ edema bilaterally on lower legs  Respiratory: Clear to auscultation without wheezes or crackles. Normal respiratory rate and effort.   Gastrointestinal: Abdomen soft, non-tender. BS normal. No masses, organomegaly  : Deferred  Musculoskeletal: Has good distal perfusion in both lower legs. Normal ROM of toes. Has left hip wound + drain in place with bloody output.  Skin: Has erythematous patch on left anterior shin- baseball size. Has smaller erythematous patch on right lower leg. Has some areas that appear to be close to weeping on the left anterior shin  Neurologic: Able to move arms, legs but mostly resting after surgery. Oriented to place, situation.  Psychiatric: mentation appears normal and affect normal    Data   Results for orders  placed or performed during the hospital encounter of 11/22/22 (from the past 24 hour(s))   Glucose by meter   Result Value Ref Range    GLUCOSE BY METER POCT 214 (H) 70 - 99 mg/dL   Glucose by meter   Result Value Ref Range    GLUCOSE BY METER POCT 178 (H) 70 - 99 mg/dL   Glucose by meter   Result Value Ref Range    GLUCOSE BY METER POCT 168 (H) 70 - 99 mg/dL   Basic metabolic panel   Result Value Ref Range    Sodium 139 133 - 144 mmol/L    Potassium 4.4 3.4 - 5.3 mmol/L    Chloride 108 94 - 109 mmol/L    Carbon Dioxide (CO2) 27 20 - 32 mmol/L    Anion Gap 4 3 - 14 mmol/L    Urea Nitrogen 26 7 - 30 mg/dL    Creatinine 0.92 0.66 - 1.25 mg/dL    Calcium 8.2 (L) 8.5 - 10.1 mg/dL    Glucose 153 (H) 70 - 99 mg/dL    GFR Estimate 89 >60 mL/min/1.73m2   Nt probnp inpatient   Result Value Ref Range    N terminal Pro BNP Inpatient 1,152 (H) 0 - 900 pg/mL   Glucose by meter   Result Value Ref Range    GLUCOSE BY METER POCT 140 (H) 70 - 99 mg/dL   Prepare red blood cells (unit)   Result Value Ref Range    Blood Component Type Red Blood Cells     Product Code R0354S21     Unit Status Returned     Unit Number [id number]     CROSSMATCH Compatible     CODING SYSTEM [id number]     ISSUE DATE AND TIME 20221122153700     UNIT ABO/RH O+     UNIT TYPE ISBT 5100    Prepare red blood cells (unit)   Result Value Ref Range    Blood Component Type Red Blood Cells     Product Code B3570T20     Unit Status Returned     Unit Number [id number]     CROSSMATCH Compatible     CODING SYSTEM [id number]     ISSUE DATE AND TIME [id number]     UNIT ABO/RH O+     UNIT TYPE ISBT 5100    Venous Panel POCT   Result Value Ref Range    pH Venous POCT 7.27 (L) 7.32 - 7.43    pCO2 Venous POCT 59 (H) 40 - 50 mm Hg    pO2 Venous POCT 90 (H) 25 - 47 mm Hg    Bicarbonate Venous POCT 27 21 - 28 mmol/L    Sodium POCT 140 133 - 144 mmol/L    Potassium POCT 5.0 3.5 - 5.0 mmol/L    Hemoglobin POCT 9.6 (L) 13.3 - 17.7 g/dL    Glucose Whole Blood POCT 185 (H) 70 - 99  mg/dL    Base Excess/Deficit (+/-) POCT -0.5 -8.1 - 1.9 mmol/L    Calcium, Ionized Whole Blood POCT 4.7 4.4 - 5.2 mg/dL    Lactic Acid POCT 0.9 <=2.0 mmol/L    FIO2 POCT 90.0 %   Venous Panel POCT   Result Value Ref Range    pH Venous POCT 7.29 (L) 7.32 - 7.43    pCO2 Venous POCT 54 (H) 40 - 50 mm Hg    pO2 Venous POCT 72 (H) 25 - 47 mm Hg    Bicarbonate Venous POCT 26 21 - 28 mmol/L    Sodium POCT 140 133 - 144 mmol/L    Potassium POCT 4.7 3.5 - 5.0 mmol/L    Hemoglobin POCT 9.2 (L) 13.3 - 17.7 g/dL    Glucose Whole Blood POCT 205 (H) 70 - 99 mg/dL    Base Excess/Deficit (+/-) POCT -1.2 -8.1 - 1.9 mmol/L    Calcium, Ionized Whole Blood POCT 4.7 4.4 - 5.2 mg/dL    Lactic Acid POCT 0.9 <=2.0 mmol/L    FIO2 POCT 47.0 %   Glucose by meter   Result Value Ref Range    GLUCOSE BY METER POCT 213 (H) 70 - 99 mg/dL   Venous Panel POCT   Result Value Ref Range    pH Venous POCT 7.30 (L) 7.32 - 7.43    pCO2 Venous POCT 52 (H) 40 - 50 mm Hg    pO2 Venous POCT 82 (H) 25 - 47 mm Hg    Bicarbonate Venous POCT 26 21 - 28 mmol/L    Sodium POCT 139 133 - 144 mmol/L    Potassium POCT 5.3 (H) 3.5 - 5.0 mmol/L    Hemoglobin POCT 7.6 (L) 13.3 - 17.7 g/dL    Glucose Whole Blood POCT 217 (H) 70 - 99 mg/dL    Base Excess/Deficit (+/-) POCT -1.0 -8.1 - 1.9 mmol/L    Calcium, Ionized Whole Blood POCT 4.3 (L) 4.4 - 5.2 mg/dL    Lactic Acid POCT 1.3 <=2.0 mmol/L    FIO2 POCT 40.0 %   Glucose by meter   Result Value Ref Range    GLUCOSE BY METER POCT 215 (H) 70 - 99 mg/dL   Glucose by meter   Result Value Ref Range    GLUCOSE BY METER POCT 206 (H) 70 - 99 mg/dL   XR Pelvis Port 1/2 Views    Narrative    EXAM: XR PELVIS PORT 1/2 VIEWS  LOCATION: [facility]  DATE/TIME: 11/22/2022 9:49 PM    INDICATION: Status post hip surgery.  COMPARISON: 11/14/2022      Impression    IMPRESSION: Postop changes related to explantation of left total hip arthroplasty with proximal left femur osteotomy transfixed with 2  cerclage wires. Presumably antibiotic impregnated cement spacer present in the acetabular fossa. A surgical drain   projects over the operative site. Unchanged right hip joint space. No displaced pelvic fracture identified. Several chronic bone fragments project along the inferior margin of the left ischial tuberosity. Arterial calcification. Expected postop soft   tissue gas and swelling about the left hip.   Glucose by meter   Result Value Ref Range    GLUCOSE BY METER POCT 235 (H) 70 - 99 mg/dL   Glucose by meter   Result Value Ref Range    GLUCOSE BY METER POCT 160 (H) 70 - 99 mg/dL   Glucose by meter   Result Value Ref Range    GLUCOSE BY METER POCT 192 (H) 70 - 99 mg/dL

## 2022-11-23 NOTE — PROGRESS NOTES
Text page MD and talked to Ortho resident.     [address] ALBERTINA Bustos  admitted in [facility]. Hg this am is : 6.8. pls advice.

## 2022-11-23 NOTE — BRIEF OP NOTE
[facility]    Brief Operative Note    Pre-operative diagnosis: Left hip prosthetic joint  Post-operative diagnosis: Same    Procedure: Explantation of Chronic  Infected Left Total Hip Arthroplasty, Extended Trochanteric Osteotomy with Extensive Debridement  and Reconstruction Using Prosthetic Antibiotic Cement Spacer  Surgeon: Rom Meyers MD - Primary    Assistants:      * Curt Mariano PA-C - Assisting     * Damian Frank MD - Resident - Assisting  Anesthesia: General   Estimated Blood Loss: 1000 mL from 11/22/2022 12:07 PM to 11/22/2022  8:51 PM      Drains: Atif-Betancourt  Specimens:   ID Type Source Tests Collected by Time Destination   A : #1 Tissue Hip, Left ANAEROBIC BACTERIAL CULTURE ROUTINE, FUNGAL OR YEAST CULTURE ROUTINE, AEROBIC BACTERIAL CULTURE ROUTINE Rom Meyers MD 11/22/2022  2:21 PM    B : #2 Tissue Hip, Left ANAEROBIC BACTERIAL CULTURE ROUTINE, FUNGAL OR YEAST CULTURE ROUTINE, AEROBIC BACTERIAL CULTURE ROUTINE Rom Meyers MD 11/22/2022  2:35 PM    C : #3 Tissue Hip, Left ANAEROBIC BACTERIAL CULTURE ROUTINE, FUNGAL OR YEAST CULTURE ROUTINE, AEROBIC BACTERIAL CULTURE ROUTINE Rom Meyers MD 11/22/2022  2:35 PM    D : #4 Tissue Hip, Left ANAEROBIC BACTERIAL CULTURE ROUTINE, FUNGAL OR YEAST CULTURE ROUTINE, AEROBIC BACTERIAL CULTURE ROUTINE Rom Meyers MD 11/22/2022  2:35 PM    E : #5 Tissue Hip, Left ANAEROBIC BACTERIAL CULTURE ROUTINE, FUNGAL OR YEAST CULTURE ROUTINE, AEROBIC BACTERIAL CULTURE ROUTINE Rom Meyers MD 11/22/2022  4:11 PM    F : #6 Tissue Hip, Left ANAEROBIC BACTERIAL CULTURE ROUTINE, FUNGAL OR YEAST CULTURE ROUTINE, AEROBIC BACTERIAL CULTURE ROUTINE Rom Meyers MD 11/22/2022  4:50 PM      Findings: Purulence throughout, sinus tract.  Well fixed stem.  Extended trochanteric osteotomy needed for stem removal.   Complications: None.  Implants:   Implant Name Type Inv. Item Serial No.   Lot No. LRB No. Used Action   IMP CABLE RAJEEV SORENSON BEADED CBL W/SLEEVE SET 2MM 6704-0-520 - [id number] Metallic Hardware/[place] IMP CABLE RAJEEV SORENSON BEADED CBL W/SLEEVE SET 2MM 6704-0-520  [employer] [id number] Left 1 Implanted   IMP CABLE RAJEEV SORENSON BEADED CBL W/SLEEVE SET 2MM 6704-0-520 - [id number] Metallic Hardware/[place] IMP CABLE RAJEEV SORENSON BEADED CBL W/SLEEVE SET 2MM 6704-0-520  [employer] [id number] Left 1 Implanted   CEMENT BONE GENTAMICIN LOW VISCOSITY 1X35.3GR [id number] - [id number] Cement, Bone CEMENT BONE GENTAMICIN LOW VISCOSITY 1X35.3GR 454947  [employer] [id number] Left 3 Implanted   CEMENT BONE GENTAMICIN LOW VISCOSITY 1X35.3GR 104328 - [id number] Cement, Bone CEMENT BONE GENTAMICIN LOW VISCOSITY 1X35.3GR 253231  [employer] [id number] Left 1 Implanted   IMP SPACER G21 CEMENT 13MM SPACEFLEX HIP MTL STEM [id number] [id number] - [id number] Total Joint Component/Insert IMP SPACER G21 CEMENT 13MM SPACEFLEX HIP MTL STEM [id number] 13  [employer] [id number] Left 1 Implanted       POST OPERATIVE PLAN    Assessment/Plan: Waldo Bustos is a 72 year old male s/p Explantation of Chronic  Infected Left Total Hip Arthroplasty, Extended Trochanteric Osteotomy with Extensive Debridement and Reconstruction Using Antibiotic Cement Spacer on 11/22/2022 with Dr. Meyers.    Activity: Up with assist and assistive devices as needed until independent.  Weight bearing status: NWB LLE  Antibiotics: Vancomycin, Zosyn until intraoperative cultures have resulted  Diet: Begin with clear fluids and progress diet as tolerated. Bowel regimen. Anti-emetics PRN.    DVT prophylaxis: Mechanical while in hospital with To be determined on POD1  Elevation: Elevate heels off of bed on pillows   Wound Care: Alginate, tegaderm to be changed PRN by ortho  Drains: 15f GUERRERO drain  Tran: Remove POD1  Pain management: Orals PRN, IV for breakthrough only  X-rays: AP Pelvis and lateral in PACU  Physical Therapy: Mobilization, ROM,  ADL's, Posterior hip precautions  Occupational Therapy: ADL's  Labs: Trend Hgb on POD #1, 2  Consults: PT, OT. Hospitalist, appreciate assistance in caring for this patient throughout the perioperative period    Future Appointments   Date Time Provider Department Center   11/23/2022  8:15 AM Aisha Prakash Pt, PT [facility] [place]   11/23/2022 11:15 AM Wendy Betancur, OT [facility] [place]   12/12/2022 11:45 AM Curt Mariano, [facility]       Disposition: Pending progress with therapies, pain control on orals, and medical stability, anticipate discharge to TCU with ID plan on POD 3-5  Follow up: Plan for follow up with Dr. Meyers in 2 weeks.

## 2022-11-23 NOTE — OR NURSING
PACU to Inpatient Nursing Handoff    Patient Waldo Bustos is a 72 year old male who speaks English.   Procedure Procedure(s):  Explantation of Chronic  Infected Left Total Hip Arthroplasty, Extended Trochanteric Osteotomy with Extensive Debridement  and Reconstruction Using G 20 Prosthetic Antibiotic Cement Spacer   Surgeon(s) Primary: Rom Meyers MD  Assisting: Curt Mariano, DIANA  Resident - Assisting: Damian Frank MD  Resident - Observing: Rom Gan MD     Allergies   Allergen Reactions     Sulfa Drugs      Other reaction(s): *Unknown - Childhood Rxn     Tizanidine Other (See Comments)     Frequent urination; causes drowsiness, and dry mouth         Isolation  [unfilled]     Past Medical History   has a past medical history of Arthritis (5 years ago), Chronic osteoarthritis (Not sure), Diabetes (H) (10-12 years ago), DVT (deep venous thrombosis) (H), Dyslipidemia, Gastroesophageal reflux disease, History of blood transfusion, Iliopsoas abscess (H), Infection of prosthetic hip joint (H), JAIR (obstructive sleep apnea), Pulmonary embolism (H), RA (rheumatoid arthritis) (H) (Not sure), Reduced vision (2-3 years ago), and Venous insufficiency.    Anesthesia General   Dermatome Level     Preop Meds acetaminophen (Tylenol) - time given: 1003  TXA - time given: 1003   Nerve block Not applicable   Intraop Meds albuterol (Proventil, Ventolin)  fentanyl (Sublimaze): 100 mcg total  hydromorphone (Dilaudid): 1 mg total  ondansetron (Zofran): last given at 1452   Local Meds Yes   Antibiotics cefazolin (Ancef) - last given at 1830     Pain Patient Currently in Pain: denies   PACU meds  acetaminophen (Tylenol): 975 mg (total dose) last given at 2249   fentanyl (Sublimaze): 100 mcg (total dose) last given at 2138   hydromorphone (Dilaudid): 0.2 mg (total dose) last given at 2154   oxycodone (Roxicodone): 5 mg (total dose) last given at 2249    PCA / epidural No   Capnography     Telemetry ECG Rhythm:  Normal sinus rhythm   Inpatient Telemetry Monitor Ordered? No        Labs Glucose Lab Results   Component Value Date     11/22/2022     11/22/2022     11/22/2022       Hgb Lab Results   Component Value Date    HGB 7.6 11/22/2022    HGB 9.9 11/14/2022       INR No results found for: INR   PACU Imaging Completed     Wound/Incision Incision/Surgical Site 11/22/22 Left Hip (Active)   Incision Assessment [facility] 11/22/22 2053   Daria-Incision Assessment [facility] 11/22/22 2053   Closure DEEP 11/22/22 2053   Incision Drainage Amount [facility] 11/22/22 2053   Dressing Intervention Clean, dry, intact 11/22/22 2053   Number of days: 0      CMS        Equipment ice pack   Other LDA       IV Access Peripheral IV 11/22/22 Left;Dorsal Hand (Active)   Number of days: 0       Peripheral IV 11/22/22 Right Wrist (Active)   Site Assessment [facility] 11/22/22 2053   Line Status Saline locked 11/22/22 2053   Phlebitis Scale 0-->no symptoms 11/22/22 2053   Number of days: 0       Peripheral IV Left Upper forearm (Active)   Site Assessment [facility] 11/22/22 2053   Line Status Infusing 11/22/22 2053   Phlebitis Scale 0-->no symptoms 11/22/22 2053   Number of days:       Blood Products Albumin EBL 1000 mL   Intake/Output Date 11/22/22 0700 - 11/23/22 0659   Shift 9161-7203 6988-2723 3756-2924 24 Hour Total   INTAKE   I.V.  4500  4500   Colloid  750  750   Shift Total(mL/kg)  5250(35.69)  5250(35.69)   OUTPUT   Urine  550  550   Blood  1000  1000   Shift Total(mL/kg)  1550(10.54)  1550(10.54)   Weight (kg) 147.1 147.1 147.1 147.1      Drains / Tran Closed/Suction Drain 1 Left Hip Bulb 15 Pashto (Active)   Site Description [facility] 11/22/22 2053   Dressing Status Normal: Clean, Dry & Intact 11/22/22 2053   Drainage Appearance Serosanguenous;Dark Red 11/22/22 2053   Number of days: 0       Urethral Catheter 11/22/22 Latex;Double-lumen;Straight-tip 16 fr (Active)   Tube Description [facility] 11/22/22 2053   Collection Container Patent 11/22/22 2053   Securement  Method Leg strap 11/22/22 2053   Rationale for Continued Use Anesthesia 11/22/22 2053   Number of days: 0      Time of void PreOp Void Prior to Procedure: 0600 (11/22/22 0955)    PostOp      Diapered? No   Bladder Scan     PO    tolerating sips, water and applesauce     Vitals    B/P: 125/54  T: 98.8  F (37.1  C)    Temp src: Axillary  P:  Pulse: 80 (11/22/22 2054)          R: 28  O2:  SpO2: 95 %    O2 Device: Simple face mask (11/22/22 2053)    Oxygen Delivery: 6 LPM (11/22/22 2053)         Family/support present none present   Patient belongings     Patient transported on cart   DC meds/scripts (obs/outpt) Not applicable   Inpatient Pain Meds Released? Yes       Special needs/considerations On insulin drip. MD Porter ok with discontinuing insulin drip since pt's blood sugar was 160. Updated floor RN.   Tasks needing completion None       Kelsie Mcgovern, RN  [facility] [id number]

## 2022-11-23 NOTE — CONSULTS
GENERAL INFECTIOUS DISEASES CONSULTATION     Patient:  Waldo Bustos   Date of birth 1950, Medical record number [id number]  Date of Visit:  11/23/2022  Date of Admission: 11/22/2022  Consult Requested by:Rom Meyers MD  Reason for Consult:  Assess patient for potential blood infection         Assessment and Recommendations:   ASSESSMENT:   Mr. Waldo Bustos is an 72 year old male that was admitted to the [facility] due to infection with his left hip prosthetic. He is s/p orthopaedic surgery extension of trochanteric osteotomy with extensive debridement and placement of prosthetic antibiotic cement spacer. 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 Progress note from [facility]), stated that the patient experienced an episode of low oxygen saturation and is now on 5L of oxygen via mask. Medication lis reveals patient is being administered zosyn. Blood culture taken on 11/14/22 for aerobic and anaerobic bacteria, they returned positive results for Proteus mirabilis, Porphyromonas species, gram negative bacilli, gram positive bacilli, gram positive cocci, and WBC. GFR estimate is measured at 59 and HbA1c measured at 6.8 as of 11/23/22. Tissue cultures were taken from the surgical site.    RECOMMENDATION:  1. ID will continue to follow the tissue and blood cultures.   2. Continue the zosyn.     Discussed with Dr Young    Thank you for allowing us to participate in the care of this patient    Cali Thompson DDS, PGY-1  [facility] Resident on ID rotation    Attestation:  This patient has been seen and evaluated by me.  I discussed this patient with the resident Dr Thompson  and agree with the findings and plan in this note. I also personally edited this note to reflect my findings. I have reviewed today's vital signs, medications, labs and imaging.    1).  Chronic Left hip PJI   - s/p  Explantation of Chronic  Infected Left Total Hip  Arthroplasty, Extended Trochanteric Osteotomy with Extensive Debridement and Reconstruction Using Antibiotic Cement Spacer on 11/22/2022 .  intra op left hip tissues cultures Proteus mirabilis  - 11/14/22 - left hip aspirate 11/14/22 - 3+ Proteus mirabilis , 4+ Porphyromonas sp.  (anaerobe,  a gram negative bacilli)    2. Left hip arthroplasty 3/7/2018   - 1/23/2019, revision left total hip arthroplasty . Cutibacterium acnes s/p  4 weeks of Ceftriaxone  - 2/2021, L hip aspiration   - 4/6/2022, C acnes  - 4/27/2022, drain placement. C acnes.    3. Post Op anemia Hb 6.6   4. Acute renal insufficiency Cr 1.29  5.  Diabetes mellitus HbA1c 6.6  6. CAD   7. Obesity BMI 46  8. JAIR   9. Hx of DVT/PE   10. venous stasis / venous insufficiency    Recommendtions:  - continue Zosyn and Vancomycin IV for now.   - If no MRSA, can stop vancomycin  - if cultures are positive for previously isolated bacteria, can switch Zosyn to Unasyn 3 gram IV q6hr    - if febrile, get blood cx x2     ID will continue to follow. Dr Moon will be on call thursday - Sunday and Dr Hanson  will be on call  on Monday      Moustapha Young MD,M.Med.Sc.  Infectious Diseases  Pager: [phone]          History of Present Illness:     Mr. Waldo Bustos is an 72 year old male that was admitted to the [facility] due to infection with his left hip prosthetic. He is s/p orthopaedic surgery extension of trochanteric osteotomy with extensive debridement and placement of prosthetic antibiotic cement spacer. 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  Left hip was aspirated on 11/14/22  Culture grew  Proteus mirabilis, Porphyromonas species, gram stain showed gram negative bacilli, gram positive bacilli, gram positive cocci, and WBC. During the ROS with the patient he stated that he has been experiencing a headache for the last two hours and the he feels his vision is becoming more blurry  with and without his glasses. He went on to state he feels fatigued physically.  Medication lis reveals patient is being administered zosyn. Blood culture taken on 11/14/22 for aerobic and anaerobic bacteria, they returned positive results for Proteus mirabilis, Porphyromonas species, gram negative bacilli, gram positive bacilli, gram positive cocci, and WBC. GFR estimate is measured at 59 and HbA1c measured at 6.8 as of 11/23/22. Tissue cultures were taken from the surgical site.    Imaging:   XR pelvis and hip, left side      FINDINGS: AP pelvis and frog-leg lateral left hip views were obtained.     Surgical drain tip projects over the lateral soft tissues. Total left  hip arthroplasty. Radiographic appearance similar to 1/27/2022 with  subtle bone loss along the greater tuberosity and proximal medial  femoral stem. Mild to moderate right hip degenerative change. Vascular  calcifications.      XR Pelvis Port 1/2   IMPRESSION: Postop changes related to explantation of left total hip arthroplasty with proximal left femur osteotomy transfixed with 2 cerclage wires. Presumably antibiotic impregnated cement spacer present in the acetabular fossa. A surgical drain   projects over the operative site. Unchanged right hip joint space. No displaced pelvic fracture identified. Several chronic bone fragments project along the inferior margin of the left ischial tuberosity. Arterial calcification. Expected postop soft   tissue gas and swelling about the left hip.                                                                   IMPRESSION: Total left hip arthroplasty appears similar to 1/27/2022.  Recent culture results include:  No results found for: CULT           Review of Systems:   CONSTITUTIONAL:  No fevers or chills  EYES: negative for icterus  ENT:  negative for hearing loss, tinnitus and sore throat  RESPIRATORY:  negative for cough with sputum and dyspnea  CARDIOVASCULAR:  negative for chest pain,  dyspnea  GASTROINTESTINAL:  negative for nausea, vomiting, diarrhea and constipation  GENITOURINARY:  negative for dysuria  HEME:  No easy bruising  INTEGUMENT: see HPI   NEURO:  see HPI            Past Medical History:     Past Medical History:   Diagnosis Date     Arthritis 5 years ago     Chronic osteoarthritis Not sure     Diabetes (H) 10-12 years ago     DVT (deep venous thrombosis) (H)      Dyslipidemia      Gastroesophageal reflux disease      History of blood transfusion      Iliopsoas abscess (H)      Infection of prosthetic hip joint (H)      JAIR (obstructive sleep apnea)      Pulmonary embolism (H)      RA (rheumatoid arthritis) (H) Not sure     Reduced vision 2-3 years ago    Cataract Surgery on both eyes     Venous insufficiency             Past Surgical History:     Past Surgical History:   Procedure Laterality Date     Cataract       COLONOSCOPY       EGD       IR IVC FILTER PLACEMENT      removed     IRRIGATION AND DEBRIDEMENT HIP, PLACE ANTIBIOTIC CEMENT BEADS / SPACER Left 11/22/2022    Procedure: and Reconstruction Using G 20 Prosthetic Antibiotic Cement Spacer;  Surgeon: Rom Meyers MD;  Location: [facility] OR     REMOVE HARDWARE ARTHROPLASTY HIP Left 11/22/2022    Procedure: Explantation of Chronic  Infected Left Total Hip Arthroplasty, Extended Trochanteric Osteotomy with Extensive Debridement;  Surgeon: Rom Meyers MD;  Location: [facility] OR     [facility] PELVIS/HIP JOINT SURGERY UNLISTED       [facility] STOMACH SURGERY PROCEDURE UNLISTED  1954    2 Hernia surgeries as a child            Family History:     Family History   Adopted: Yes   Problem Relation Age of Onset     Diabetes No family hx of      Rheumatoid Arthritis No family hx of      Low Back Problems No family hx of      Unknown/Adopted No family hx of             Social History:     Social History     Tobacco Use     Smoking status: Never     Smokeless tobacco: Never   Substance Use Topics     Alcohol use: No     History   Sexual Activity      Sexual activity: Never            Current Medications (antimicrobials listed in bold):       acetaminophen  975 mg Oral Q8H     atorvastatin  40 mg Oral Daily     insulin aspart  1-7 Units Subcutaneous TID AC     insulin aspart  1-5 Units Subcutaneous At Bedtime     piperacillin-tazobactam  3.375 g Intravenous Q6H     polyethylene glycol  17 g Oral Daily     senna-docusate  1 tablet Oral BID     sodium chloride (PF)  3 mL Intracatheter Q8H     [START ON 11/24/2022] vancomycin  1,500 mg Intravenous Q24H            Allergies:     Allergies   Allergen Reactions     Sulfa Drugs      Other reaction(s): *Unknown - Childhood Rxn     Tizanidine Other (See Comments)     Frequent urination; causes drowsiness, and dry mouth              Physical Exam:   Vitals were reviewed  Patient Vitals for the past 24 hrs:   BP Temp Temp src Pulse Resp SpO2   11/23/22 1238 134/50 -- -- -- -- 95 %   11/23/22 1144 133/47 -- Oral -- 16 --   11/23/22 1053 (!) 134/32 97.8  F (36.6  C) Oral 84 18 95 %   11/23/22 1040 124/44 98.1  F (36.7  C) Oral 84 16 95 %   11/23/22 0736 (!) 120/37 96.9  F (36.1  C) Oral 86 10 95 %   11/23/22 0704 -- -- -- -- -- 94 %   11/23/22 0603 (!) 145/52 -- -- 80 16 94 %   11/23/22 0300 -- -- -- 82 -- 93 %   11/23/22 0150 (!) 154/52 -- -- 95 -- 93 %   11/23/22 0038 -- -- -- -- -- (!) 84 %   11/23/22 0030 133/41 -- -- 87 -- --   11/22/22 2350 128/48 -- -- 87 -- 92 %   11/22/22 2340 128/50 98  F (36.7  C) Oral 83 16 91 %   11/22/22 2315 133/55 -- -- 81 16 90 %   11/22/22 2300 133/51 98.6  F (37  C) Axillary 84 11 91 %   11/22/22 2245 133/63 -- -- 84 13 95 %   11/22/22 2230 125/60 -- -- 84 15 97 %   11/22/22 2215 122/55 -- -- 81 14 98 %   11/22/22 2200 116/53 -- -- 86 11 98 %   11/22/22 2145 -- -- -- 84 12 95 %   11/22/22 2130 120/51 -- -- 83 13 96 %   11/22/22 2115 117/58 -- -- 81 15 97 %   11/22/22 2100 120/55 -- -- 83 12 97 %   11/22/22 2054 125/54 -- -- 80 28 --   11/22/22 2053 -- 98.8  F (37.1  C) Axillary -- -- --      Ranges for his vital signs:  Temp:  [96.9  F (36.1  C)-98.8  F (37.1  C)] 97.8  F (36.6  C)  Pulse:  [80-95] 84  Resp:  [10-28] 16  BP: (116-154)/(32-63) 134/50  SpO2:  [84 %-98 %] 95 %    Physical Examination:  GENERAL:  well-developed, well-nourished, in bed in no acute distress. NC+  HEENT:  Head is normocephalic, atraumatic   EYES:  Eyes have anicteric sclerae without conjunctival injection   ENT:  Oropharynx is moist without exudates or ulcers. Tongue is midline  NECK:  Supple. No  Cervical lymphadenopathy  LUNGS:  Clear to auscultation bilateral. decreased breath sounds in bases  CARDIOVASCULAR:  Regular rate and rhythm with no murmurs, gallops or rubs.  ABDOMEN:  Normal bowel sounds, soft, nontender. No appreciable hepatosplenomegaly  SKIN:  left hip site covered wound vac  Line(s) are in place without any surrounding erythema or exudate. No stigmata of endocarditis.  NEUROLOGIC:  Grossly nonfocal. Active x4 extremities         Laboratory Data:     Inflammatory Markers  No lab results found.    Hematology Studies    Recent Labs   Lab Test 11/23/22  0612 11/22/22  1925 11/22/22  1644 11/22/22  1558 11/14/22  1329   WBC  --   --   --   --  10.8   HGB 6.8* 7.6* 9.2* 9.6* 9.9*   MCV  --   --   --   --  94   PLT  --   --   --   --  398       Immune Globulin Studies  No lab results found.    Metabolic Studies     Recent Labs   Lab Test 11/23/22  0612 11/22/22 1925 11/22/22  1644 11/22/22  1558 11/22/22  1330 11/09/22  1535    139 140 140 139 137   POTASSIUM 5.8* 5.3* 4.7 5.0 4.4 4.2   CHLORIDE 106  --   --   --  108 101   CO2 28  --   --   --  27 25   BUN 30  --   --   --  26 33.7*   CR 1.29*  --   --   --  0.92 1.36*   GFRESTIMATED 59*  --   --   --  89 56*       Hepatic Studies    Recent Labs   Lab Test 11/23/22  0612   BILITOTAL 0.2   ALKPHOS 56   ALBUMIN 2.4*   AST 48*   ALT 19       Thyroid Studies  No lab results found.    Invalid input(s): FT2    Microbiology:  No results found for: CULT    Urine  Studies  No lab results found.    Vancomycin Levels  No lab results found.    Invalid input(s): VANCO    Serostatus:  No results found for: CMVG, CMIGG, CMIG, CMIM, CMVIGM, CMVM, CMLTX, HSVG1, HSVG2, HSVTP1, WK9521, HS12M, HS12GR, HS1GR, HS2GR, HERPES, HSIM, HSIG, HSIGR, HSVIGMAB, HSVG1, VARICZOSAB, EBVIGG, EBIGG, EBVAGN, CZ0289  No results found for: EBIG2, EBIGM, TOXG  No lab results found.    Invalid input(s): HSV12, VZVG, XSK681    Toxoplasma Testing  No lab results found.    Invalid input(s): TOXPL, TOXABM, TOXPCR    Virology:  CMV viral loads  No lab results found.  No results found for: CMQNT, CMVQ  EBV viral loads   No lab results found.  No results found for: EBVDN, EBRES, EBVDN, EBVSP, EBVPC, EBVPCR  BK viral loads No lab results found.    Invalid input(s): BKDN, BKVPC, BKVPCR  HSV testing  No lab results found.    Hepatitis B Testing No lab results found.  Hepatitis C Testing   No results found for: HCVAB, HQTG, HCGENO, HCPCR, HQTRNA, HEPRNA  Respiratory Virus Testing    No results found for: RS, FLUAG

## 2022-11-23 NOTE — PROGRESS NOTES
11/23/22 7926   Appointment Info   Signing Clinician's Name / Credentials (PT) IVETH Chambers   Student Supervision On-site supervision provided;Therapy services provided with the co-signing licensed therapist guiding and directing the services, and providing the skilled judgement and assessment throughout the session       Present no   Language English   Living Environment   People in Home significant other   Current Living Arrangements house   Home Accessibility stairs within home;stairs to enter home   Number of Stairs, Main Entrance 1   Stair Railings, Main Entrance none  (large enough to fit FWW)   Number of Stairs, Within Home, Primary greater than 10 stairs;other (see comments)  (Pt must ascend 7 stairs to bedroom, but has been sleeping in living room downstairs (down 6 stairs) due to set up being more comfortable. On this level, bathroom and walk in shower are also present)   Stair Railings, Within Home, Primary railings safe and in good condition;railing on left side (ascending);railing on right side (ascending)   Transportation Anticipated family or friend will provide   Living Environment Comments Pt has been going down 6 stairs to sleep on couch instead of in bed as it is more comfortable. 2 walk in showers available for use, has shower chair. also has hand held shower head. Girlfriend lives with patient but has bad back and knees and will be unable to physically assist   Self-Care   Usual Activity Tolerance fair   Current Activity Tolerance poor   Equipment Currently Used at Home cane, straight   Fall history within last six months yes   Number of times patient has fallen within last six months 1  (no injuries from fall)   Activity/Exercise/Self-Care Comment Pt able to drive, difficult to get into vehicle hower. Pt manages pain with tylenol. Pt went to physical therapy to manage pain. Unable to walk further distances prior to surgery. Able to ambulate around grocery store  with cart for support.   General Information   Onset of Illness/Injury or Date of Surgery 11/22/22   Referring Physician Rom Meyers MD   Patient/Family Therapy Goals Statement (PT) Would like to be able to walk   Pertinent History of Current Problem (include personal factors and/or comorbidities that impact the POC) Epic EMR: Explantation of Chronic  Infected Left Total Hip Arthroplasty, Extended Trochanteric Osteotomy with Extensive Debridement   Existing Precautions/Restrictions fall;no hip IR;90 degree hip flexion;weight bearing;other (see comments);no hip ADD past midline  (NWB LLE)   Weight-Bearing Status - LLE nonweight-bearing   Weight-Bearing Status - RLE full weight-bearing   Cognition   Affect/Mental Status (Cognition) low arousal/lethargic   Orientation Status (Cognition) oriented x 3   Follows Commands (Cognition) WFL   Cognitive Status Comments PT is lethargic/low arousal. When taking subjective information, pt does not always directly answer questions and goes off on tangents. Unclear f he is the most reliable narrator.   Pain Assessment   Patient Currently in Pain Yes, see Vital Sign flowsheet   Integumentary/Edema   Integumentary/Edema other (describe)   Integumentary/Edema Comments unable to observe incisio due to dressing in place. Edema BLE, and in fingers   Posture    Posture Comments Unable to assess as pt was unable to sit up at EOB   Range of Motion (ROM)   Range of Motion ROM deficits secondary to surgical procedure;ROM deficits secondary to pain;ROM deficits secondary to swelling;ROM deficits secondary to weakness   ROM Comment Pt unable to achieve neutral hip rotation, L hip remains in external rotation. Unable to flex hip without assist   Strength (Manual Muscle Testing)   Strength (Manual Muscle Testing) Deficits observed during functional mobility  (Unable to perform SLR)   Bed Mobility   Comment, (Bed Mobility) Unable to assess due to pain   Transfers   Comment, (Transfers) Unable  to assess due to pain   Gait/Stairs (Locomotion)   Comment, (Gait/Stairs) Unable to assess due to pain   Balance   Balance other (describe)   Balance Comments Unable to assess   Sensory Examination   Sensory Perception patient reports no sensory changes   Coordination   Coordination other (see comments)   Coordination Comments Unable to assess   Muscle Tone   Muscle Tone no deficits were identified   Clinical Impression   Criteria for Skilled Therapeutic Intervention Yes, treatment indicated   PT Diagnosis (PT) Impaired bed mobility, transfers, and giat.   Influenced by the following impairments pain, ROM deficits secondary to surgical procedure   Functional limitations due to impairments gait, stairs, bed mobility, transfers   Clinical Presentation (PT Evaluation Complexity) Evolving/Changing   Clinical Presentation Rationale Pt currently in a significant amount of pain due to surgical procedure and believes he will not wallk again. Pt additionally has a history of RA which limits his  strength to hold walker during gait. Anticipate that pt may improve with pain control but likely will not be able to ambulate indpendently.  Pt has multiple comorbidities that could impact POC.   Clinical Decision Making (Complexity) low complexity   Planned Therapy Interventions (PT) bed mobility training;gait training;ROM (range of motion);strengthening;transfer training   Anticipated Equipment Needs at Discharge (PT) walker, rolling   Risk & Benefits of therapy have been explained evaluation/treatment results reviewed;care plan/treatment goals reviewed;risks/benefits reviewed;current/potential barriers reviewed;participants voiced agreement with care plan;participants included;patient   Clinical Impression Comments Pt believes from his online research that it is unlikely he will walk again. In combination with this belief and excessive pain, pt is not motivated to move today and does not put in much effort to participate in  physical therapy.   PT Total Evaluation Time   PT Eval, Low Complexity Minutes (19710) 10   Plan of Care Review   Plan of Care Reviewed With patient   Physical Therapy Goals   PT Frequency Daily   PT Predicted Duration/Target Date for Goal Attainment 11/27/22   PT Goals Bed Mobility;Transfers;Gait   PT: Bed Mobility Supervision/stand-by assist;Supine to/from sit;Bridging;Within precautions   PT: Transfers Supervision/stand-by assist;Sit to/from stand;Assistive device;Within precautions   Interventions   Interventions Quick Adds Therapeutic Activity;Therapeutic Procedure   Therapeutic Procedure/Exercise   Ther. Procedure: strength, endurance, ROM, flexibillity Minutes (83852) 10   Symptoms Noted During/After Treatment increased pain;fatigue   Treatment Detail/Skilled Intervention PT: Pt educated on importance of strengthening in seated/supine position due to WB restrictions for extended period of time in order to gain functional strength. Pt completes x10 of each: ankle pumps, and glute sets. Pt assisted with heel slides, in relative ER as pt is unable to achieve neutral hip rotation.   Therapeutic Activity   Therapeutic Activities: dynamic activities to improve functional performance Minutes (87108) 5   Symptoms Noted During/After Treatment Fatigue;Increased pain   Treatment Detail/Skilled Intervention PT: Pt greeted in supine in bed, agreeable to PT, but is uncertain how much he will be able to do. Pt educated on posterior hip precautions and WB precautions. Attempted supine to sit transfer at EOB, MaxA x1. Pt declined quickly with minimal movement due to pain. Pt left at end of session supine in bed with call light and essential needs within reach.   PT Discharge Planning   PT Plan Clarify hip precautions with ortho, Add to HEP for BELLA to improve patient strength in regards to bed mobility, sit at EOB.   PT Discharge Recommendation (DC Rec) Transitional Care Facility;home with home care physical therapy;home with  assist   PT Rationale for DC Rec Primary recommendation for pt is TCU. Pt is significantly below baseline mobility. Pt is unable to mobilize in bed, and is unable to actively move L LE without assist into neutral hip rotation or flexion. Pt's home has multiple steps, and pt lives with girlfriend but she will be unable to provide the assist he needs as she has back and knee problems. In order to dc home, pt would require FWW, wheelchair to bump pt up and down stairs in home, and 24/7 assist of able bodied person in order to aid pt in transfers and bed mobility.   PT Brief overview of current status Unsuccessful with bed mobility/supine <> sit transfer (MaxA x1 unsuccessful)   Total Session Time   Timed Code Treatment Minutes 15   Total Session Time (sum of timed and untimed services) 25

## 2022-11-23 NOTE — PLAN OF CARE
Goal Outcome Evaluation:    VS: VSS and afebrile.  Refused capno overnight    O2: Has been desating since pt got here.   Using personal CPAP but cont to desat.  RT came and attempted to connect a O2 to CPAP. (See notes)  Now in Oxymask at 5L,tolerating well. IS encouraged.    Output: Adequate output in billy cath\   Last BM: 11/22. PTA per pt report   Activity: NWB on LLE.   Has not gotten OOB.    Skin:  Redness to abdominal folds, blanchable redness to BLE, bruise to L earlobe and surgical incision   Pain: Pt has been sleepy but arousable to voice all night.  Given Oxcodone PRN,Tyelnol and ice pack.    Neuro: Alert and oriented x4.    C/o new numbness to both hands,  L hand is improving overnight. R hand- remains the same.   Both hands: swollen, elevated with pillows.    Both legs: swollen. Elevated with pillows.      Dressing: Alginate/ tegaderm CDI    Diet: Regular. Decline to eat this shift. Prefers to sleep.    LDA: 2 PIVs ( L hand) : SL  1 PIVs ( R hand )infusing   GUERRERO drain     Equipment: PCD, IV pole,pump,capno, walker and personal belongings, cellphone, cpap at bedside   Plan: Cont POC   Additional Info:

## 2022-11-23 NOTE — PLAN OF CARE
"VS: /47   Pulse 94   Temp 98.5  F (36.9  C) (Oral)   Resp 16   Ht 1.778 m (5' 10\")   Wt 147.1 kg (324 lb 4.8 oz)   SpO2 95%   BMI 46.53 kg/m      O2: 95% on 3 L NC.continues pulse ox. Denies chest pain or SOB.    Output: Tran in place. Adequate output.    Last BM: 11/22/22 per pt report. Bowel sounds active.   Activity: Pt has not gotten out bed. NWB on  RLE. Uses cane prior surgery        Up for meals? No. Up in bed    Skin: Left hip surgical incision. BLE edema. Bilateral and BUE swelling . Redness to abdominal folds    Pain: Marianela headache, right hand  and back pain. PRN oxycodone and scheduled tylenol given for pain    CMS /Neuro: AO x 4. BUE numbness. Denies nausea or vomiting    Dressing: CDI    Diet: Regular diet. Blood sugar checks x 4   LDA: PIVs (( 2 left hand) and 1 right hand. Left upper forearm infusing TKO  and other saline locked.               Equipment: IV pole/pump, call light, CAPNO, PCDs and personal CPAP personal belongings    Plan: TBD    Additional Info:  Low hg 6.8 and K+ 5.8  at 0612 ,1 unit  blood refused this morning at 1045. Recheck Hg was 6.6. MD was paged. New order  Obtined to infused another 1 unit. Will endorse it to coming nurse.           "

## 2022-11-24 NOTE — PROGRESS NOTES
"Orthopedic Surgery Progress Note: 11/24/2022    Subjective:   Multiple blood transfusions on 11/23 (3 units).  Last hemoglobin 7.1.  Pain well-controlled. Tolerating a diet. No new concerns or complaints. Denies SOB, chest Pain, fevers, chills. HR is 75 during examination.     Objective:   /48 (BP Location: Left arm)   Pulse 86   Temp 100.4  F (38  C) (Oral)   Resp 16   Ht 1.778 m (5' 10\")   Wt 147.1 kg (324 lb 4.8 oz)   SpO2 95%   BMI 46.53 kg/m    No intake/output data recorded.  General: NAD. Resting comfortably in bed.  Respiratory: Breathing comfortably on RA.  Musculoskeletal:    Focused Exam of Left Lower Extremity  Dressing C/D/I.   Fires EHL, FHL, TA, GSC, Quad  SILT DP, SP, Saph, Sural, Tibial Nerve Distributions  1+ DP pulse, foot WWP. 1+ edema leg.     Output by Drain (mL) 11/22/22 0700 - 11/22/22 1459 11/22/22 1500 - 11/22/22 2259 11/22/22 2300 - 11/23/22 0659 11/23/22 0700 - 11/23/22 1459 11/23/22 1500 - 11/23/22 2259 11/23/22 2300 - 11/24/22 0659 11/24/22 0700 - 11/24/22 0837   Closed/Suction Drain 1 Left Hip Bulb 15 [place]   100  300 80          Laboratory Data:  Lab Results   Component Value Date    WBC 10.8 11/14/2022    HGB 7.1 (L) 11/24/2022     11/14/2022         Assessment & Plan:   Waldo Bustos is a 72 year old male s/p Explantation of Chronic  Infected Left Total Hip Arthroplasty, Extended Trochanteric Osteotomy with Extensive Debridement and Reconstruction Using Antibiotic Cement Spacer on 11/22/2022 with Dr. Meyers.     11/24: Acute blood loss anemia, with recent surgery.  Hemoglobin improving with transfusion.  - Drain to remain in place  - Hold Eliquis today, in the setting of acute blood loss anemia.  Aspirin 162 mg daily started on 11/24 for DVT prophylaxis.  When safe to resume Eliquis, discontinue aspirin  - Continue antibiotics, follow cultures.  - Tran out     Cultures: Proteus mirabilis, preliminary-pending sensitivities    Activity: Up with assist and " assistive devices as needed until independent.  Weight bearing status: NWB LLE  Antibiotics: Vancomycin, Zosyn until intraoperative cultures have resulted  Diet: Begin with clear fluids and progress diet as tolerated. Bowel regimen. Anti-emetics PRN.    DVT prophylaxis: Eliquis 2.5 mg BID x2 weeks, then 5 mg BID, held for anemia  Elevation: Elevate heels off of bed on pillows   Wound Care: Alginate, tegaderm to be changed PRN by ortho  Drains: 15f GUERRERO drain  Tran: Remove POD1  Pain management: Orals PRN, IV for breakthrough only  X-rays: AP Pelvis and lateral in PACU  Physical Therapy: Mobilization, ROM, ADL's, Posterior hip precautions  Occupational Therapy: ADL's  Labs: Trend Hgb on POD #1, 2  Consults: PT, OT. Hospitalist, appreciate assistance in caring for this patient throughout the perioperative period-     Disposition: Pending progress with therapies, pain control on orals, and medical stability; anticipate discharge to likely TCU on POD3-5.    Orthopedic surgery staff for this patient is Dr. Meyers.    ------------------------------------------------------------------------------------------    Damian Frank MD  Orthopedic Surgery PGY4  [phone]      FOLLOWUP:    Future Appointments   Date Time Provider Department [place]   11/23/2022  8:15 AM Aisha Prakash Pt, PT URPT [place]   11/23/2022 11:15 AM Wendy Betancur OT UROT [place]   12/12/2022 11:45 AM Curt Mariano, DIANA ACUNAR [facility]

## 2022-11-24 NOTE — PROGRESS NOTES
Cross Cover Note:    Paged about Hgb 6.6 down from 6.8 after blood transfusion.     Patient denies s/s further bleeding. No increased pain. States he has had issues in the past with hemoglobin drops and difficult to raise requiring additional transfusions to get it back up.    Plan:  - Stop IV fluids; possible component of dilution contributing to low hemoglobin  - Given another unit PRBCs  - Continue q6h hgb checks    ANA Kenyon, PA-C  [facility] Medicine  Securely message with the Vocera Web Console (learn more here)  Text page via [facility] Paging/Directory

## 2022-11-24 NOTE — PROGRESS NOTES
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 Internal Medicine was consulted for post-operative medical management.      Chronically infected left total hip arthroplasty now s/p explantation + extended debridement/reconstruction with antibiotic cement spacer (11/22/22)  -Antibiotics per primary team and ID  - cont Vanc & pip/tazo  -Post-op pain control per primary team  -Drain management per primary team  -follow-up cultures     Acute hypoxic respiratory failure  - suspect secondary to atelectasis, hypoventilation in setting of JAIR. Was going to be on home CPAP but had desaturations and was RT unable to bleed oxygen into the tubing  -incentive spirometry, monitor respiratory rate with continuous oximetry  -oxymask as needed    #HALEY  Likely pre-renal given volume loss  - cont to monitor       JAIR with obesity- home CPAP- see above     DM2- PTA med list includes metformin, glipizide, pioglitazone, victoza  -hold home meds presently  -sliding scale insulin TID with meals and at bedtime but in the days just prior to discharge, could consider resuming a few of his home diabetes meds  -A1c (none on file in last 90 days)  -may be able to add some glargine once insulin needs more delineated     CAD- continue statin, hold aspirin until cleared by surgery team     History of DVT/PE- previously on DOAC (eliquis) but this was held perioperatively-- defer to ortho re: resumption timing     Venous insufficiency with signs of stasis dermatitis  Erythematous patches on bilateral lower  extremities - left anterior lower leg and smaller area on right lower leg  Low suspicion for cellulitis and more consistent with chronic stasis dermatitis  - PTA med list includes furosemide - could resume in 1-2 days if BP and renal function allow  -compression stockings     Normocytic anemia (?anemia of chronic disease) with acute blood loss anemia- pre-operatively had hgb of 9.9 on 11/14. 11/22 was 9.6-9.2. Intra-op trended down to 7 so he was transfused intra-operatively as well as post-op  -Trend in AM  -transfuse if <7  -Consider outpatient iron studies     Respiratory acidosis-- intra-operative blood gases with high CO2 and low pH. Will monitor closely with post-op capnography if able  Hypocalcemia- will check ionized calcium in AM- could be due to blood products or could be due to low albumin  -recheck in AM  Hyperkalemia- mild and intermittent on the intra-op labs. Asymptomatic  -recheck in AM.        Diet: Advance Diet as Tolerated: Regular Diet Adult    DVT Prophylaxis: Pneumatic Compression Devices; holding home eliquis given blood loss anemia  Tran Catheter: PRESENT, indication: Anesthesia  Central Lines: None  Cardiac Monitoring: None  Code Status: Full Code      Disposition Plan     Expected Discharge Date: 11/24/2022                The patient's care was discussed with the Patient.    TAB PARRA MD  Hospitalist Service, GOLD TEAM [address] [place] [address]  Securely message with the Vocera Web Console (learn more here)  Text page via [facility] Paging/Directory   Please see signed in provider for up to date coverage information      Clinically Significant Risk Factors        # Hyperkalemia: Highest K = 5.8 mmol/L (Ref range: 3.4-5.3) in last 2 days, will monitor as appropriate   # Hypocalcemia: Lowest iCa = 4.3 mg/dL (Ref range: 4.4-5.2 mg/dL) in last 2 days, will monitor and replace as appropriate     # Hypoalbuminemia: Lowest albumin = 2.4 g/dL (Ref range:  "3.5-5.2) at 11/23/2022  6:12 AM, will monitor as appropriate         # DMII: A1C = 6.8 % (Ref range: 0.0 - 5.6 %) within past 3 months, PRESENT ON ADMISSION  # Severe Obesity: Estimated body mass index is 46.53 kg/m  as calculated from the following:    Height as of this encounter: 1.778 m (5' 10\").    Weight as of this encounter: 147.1 kg (324 lb 4.8 oz)., PRESENT ON ADMISSION         ______________________________________________________________________    Interval History   receieved another unit of prbc  Passing gas, no BM  Remains sore mostly in left hip  Used CPAP overnight    Data reviewed today: I reviewed all medications, new labs and imaging results over the last 24 hours.      Physical Exam   Vital Signs: Temp: 100.4  F (38  C) Temp src: Oral BP: 118/48 Pulse: 86   Resp: 16 SpO2: 95 % O2 Device: Nasal cannula Oxygen Delivery: 3 LPM  Weight: 324 lbs 4.75 oz    General Appearance: Obese male, lying in bed, comfortably in bed, on room air, in no acute distress of discomfort  HEENT: PERRL: EOMI; moist mucous membrane w/o lesions  Neck: No JVD  Pulmonary: Clear to auscultation bilaterally, no wheezes or crackles  CVS: Regular rhythm, no murmurs, rubs or gallops  GI: BS (+), soft nontender, no rebound or guarding   Extremities: Left lateral hip in dressing  Skin: No rashes or lesions  Neurologic: A&O x3      Data   Recent Labs   Lab 11/24/22  0812 11/24/22  0421 11/24/22  0232 11/24/22  0159 11/23/22  1748 11/23/22  1458 11/23/22  0939 11/23/22  0612 11/22/22  2016 11/22/22  1925 11/22/22  1906 11/22/22  1644 11/22/22  1407 11/22/22  1330   HGB  --   --  7.1*  --   --  6.6*  --  6.8*  --  7.6*  --  9.2*   < >  --    NA  --   --   --   --   --   --   --  137  --  139  --  140   < > 139   POTASSIUM  --   --   --   --   --   --   --  5.8*  --  5.3*  --  4.7   < > 4.4   CHLORIDE  --   --   --   --   --   --   --  106  --   --   --   --   --  108   CO2  --   --   --   --   --   --   --  28  --   --   --   --   --  " 27   BUN  --   --   --   --   --   --   --  30  --   --   --   --   --  26   CR  --   --  1.27*  --   --  1.29*  --  1.29*  --   --   --   --   --  0.92   ANIONGAP  --   --   --   --   --   --   --  3  --   --   --   --   --  4   MAIKOL  --   --   --   --   --   --   --  7.6*  --   --   --   --   --  8.2*   * 267*  --  262*   < >  --    < > 258*   < > 217*   < > 205*   < > 153*   ALBUMIN  --   --   --   --   --   --   --  2.4*  --   --   --   --   --   --    PROTTOTAL  --   --   --   --   --   --   --  5.6*  --   --   --   --   --   --    BILITOTAL  --   --   --   --   --   --   --  0.2  --   --   --   --   --   --    ALKPHOS  --   --   --   --   --   --   --  56  --   --   --   --   --   --    ALT  --   --   --   --   --   --   --  19  --   --   --   --   --   --    AST  --   --   --   --   --   --   --  48*  --   --   --   --   --   --     < > = values in this interval not displayed.     No results found for this or any previous visit (from the past 24 hour(s)).  Medications     [Held by provider] lactated ringers 50 mL/hr at 11/23/22 0051     [Held by provider] sodium chloride 50 mL/hr at 11/23/22 0305       acetaminophen  975 mg Oral Q8H     atorvastatin  40 mg Oral Daily     insulin aspart  1-7 Units Subcutaneous TID AC     insulin aspart  1-5 Units Subcutaneous At Bedtime     piperacillin-tazobactam  3.375 g Intravenous Q6H     polyethylene glycol  17 g Oral Daily     senna-docusate  1 tablet Oral BID     sodium chloride (PF)  3 mL Intracatheter Q8H     vancomycin  1,500 mg Intravenous Q24H

## 2022-11-24 NOTE — PLAN OF CARE
Goal Outcome Evaluation:      Plan of Care Reviewed With: patient    Overall Patient Progress: improvingOverall Patient Progress: improving    Outcome Evaluation: Pt says was in a lot of pain did not want to turn or move did not get out of bed.    Patient vital signs are at baseline: No,  Reason:  still on 3 liters even with CPAP  Patient able to ambulate as they were prior to admission or with assist devices provided by therapies during their stay:  No,  Reason:  HAS NOT GOTTEN OUT OF BED, say in a lot of pain   Patient MUST void prior to discharge:  No,  Reason:  Has a billy NP said can have billy because of not able to move well  Patient able to tolerate oral intake:  Yes  Pain has adequate pain control using Oral analgesics:  Yes  Does patient have an identified :  Yes  Has goal D/C date and time been discussed with patient:  Yes      VS: Temp: 100.4  F (38  C) Temp src: Oral BP: 118/48 Pulse: 86   Resp: 16 SpO2: 95 % O2 Device: Nasal cannula Oxygen Delivery: 3 LPM    O2: CPAP with 3 liters , LS clear and equal bilaterally and diminished. Denies chest pain and SOB.    Output: Billy catheter    Last BM: 11/23/22 denies abdominal discomfort. BS active / passing flatus.    Activity: Has not been able to get out of bed says a lot of pain   Skin: WDL except, left thigh incisions, ars and legs swollen   Pain: Pain managed with Oxy and tylenol   CMS: Intact, AOx4. Right hand at times left since surgery numbness and tingling.   Dressing: C/D/I   Diet: Regular diet. Denies nausea/vomiting.      LDA: PIV SL / infusing tko   Equipment: IV pole, personal belongings,    Plan: Fall  precautions maintained / Continue with plan of care. Call light within reach, pt able to make needs known.    Additional Info: Get out of bed

## 2022-11-25 NOTE — PROGRESS NOTES
"0400 pt had episode of incontinence and refused cares to be cleaned up or have the sheets changed. Bedside RN, another RN, and writer in room to assist with turning safely to provide incontinence care and pt still refused. Pt was educated on importance of incontinence care and possibility of skin breakdown if not cleaned up. Pt stated \"I know all about skin breakdown and I don't care, I am not moving right now! Just let me lay here!\"  "

## 2022-11-25 NOTE — PLAN OF CARE
"Goal Outcome Evaluation:      Plan of Care Reviewed With: patient          Outcome Evaluation: Pt refusing to turn for hygiene and PIP cares. Blood transfusion today      VS: /61 (BP Location: Right arm)   Pulse 76   Temp 98.1  F (36.7  C) (Axillary)   Resp 12   Ht 1.778 m (5' 10\")   Wt 147.1 kg (324 lb 4.8 oz)   SpO2 98%   BMI 46.53 kg/m    Denies SOB, CP, N/T   O2: 3 LPM via NC   Output: Voiding incontinent and in urinal.    Last BM: 11/22, took bowel meds, denies discomfort. BS+   Activity: Refusing activity, all extremities supported with pillows. Adrian pads wet with urine under pt, pt reports he will attempt to roll \"maybe later today\" Continue to encourage. Pt educated on importance of activity and concerns with remaining sedetary.   Update: assist x3 to roll for bed change   Skin: Blisters to inner L thigh. Javy BLE. L hip incision. Scabs to BLE shins   Pain: C/o 8/10 pain with any movement. Gave pt 10 mg oxy x1 this shift and tylenol. Pt lethargic after oxy dose and remained lethargic after blood transfusion. Provided ice, monitoring alertness   CMS: AxO to voice, lethargic today.    Dressing: CDI   Diet: Reg, low appetite, declined lunch and supper meals  , 202   LDA: GUERRERO drain. R PIV SL   Equipment: PCDs. Urinal. Personal belongings  Ordered bariatric pulsate mattress   Plan: Encourage movement, monitoring hgb, manage pain.   Additional Info: Pt low energy today, hgb 6.3, transfused 1 unit. Trend Hgb. Pt denied SI. Pt declined  consult      "

## 2022-11-25 NOTE — PROGRESS NOTES
"Orthopedic Surgery Progress Note: 11/25/2022    Subjective:   Multiple blood transfusions on 11/23 (3 units).  Last hemoglobin 7.1.  Pain well-controlled. Tolerating a diet. No new concerns or complaints. Denies SOB, chest Pain, fevers, chills. HR is 83 during examination.     Patient is refusing nursing cares.  He is urinating, however avoid into bed.  Refusing turns.  Refusing bedsheet changes.    Patient upset with nursing overnight, refusing routine cares.    Objective:   BP (!) 128/38 (BP Location: Right arm)   Pulse 83   Temp 98.9  F (37.2  C) (Oral)   Resp 17   Ht 1.778 m (5' 10\")   Wt 147.1 kg (324 lb 4.8 oz)   SpO2 99%   BMI 46.53 kg/m    I/O this shift:  In: 120 [P.O.:120]  Out: 80 [Drains:80]  General: NAD. Resting comfortably in bed.  Respiratory: Breathing comfortably on RA.  Musculoskeletal:    Focused Exam of Left Lower Extremity  Dressing C/D/I.   Fires EHL, FHL, TA, GSC, Quad  SILT DP, SP, Saph, Sural, Tibial Nerve Distributions  1+ DP pulse, foot WWP. 1+ edema leg.     Output by Drain (mL) 11/23/22 0700 - 11/23/22 1459 11/23/22 1500 - 11/23/22 2259 11/23/22 2300 - 11/24/22 0659 11/24/22 0700 - 11/24/22 1459 11/24/22 1500 - 11/24/22 2259 11/24/22 2300 - 11/25/22 0554   Closed/Suction Drain 1 Left Hip Bulb 15 [place]  300 80  130 80         Laboratory Data:  Lab Results   Component Value Date    WBC 10.8 11/14/2022    HGB 7.1 (L) 11/24/2022     11/14/2022         Assessment & Plan:   Waldo Bustos is a 72 year old male s/p Explantation of Chronic  Infected Left Total Hip Arthroplasty, Extended Trochanteric Osteotomy with Extensive Debridement and Reconstruction Using Antibiotic Cement Spacer on 11/22/2022 with Dr. Meyers.     11/25: Acute blood loss anemia, with recent surgery. Pre-renal HALEY. Hemoglobin improving with transfusion.  - Drain to remain in place  - AM labs today, if Hgb >7, ok to resume Eliquis. Aspirin 162 mg daily started on 11/24 for DVT prophylaxis.  When safe to " resume Eliquis, discontinue aspirin  - Continue antibiotics, follow cultures.  - Continue to BladderScan per protocol, patient subjectively voiding.    Cultures: Proteus mirabilis, preliminary sensitivities- pansensitive     Activity: Up with assist and assistive devices as needed until independent.  Weight bearing status: NWB LLE  Antibiotics: Vancomycin, Zosyn until intraoperative cultures have resulted  Diet: Begin with clear fluids and progress diet as tolerated. Bowel regimen. Anti-emetics PRN.    DVT prophylaxis:  mg (current), then Eliquis 2.5 mg BID x2 weeks, then 5 mg BID, held for anemia  Elevation: Elevate heels off of bed on pillows   Wound Care: Alginate, tegaderm to be changed PRN by ortho  Drains: 15f GUERRERO drain  Tran: Remove POD1  Pain management: Orals PRN, IV for breakthrough only  X-rays: AP Pelvis and lateral in PACU complete  Physical Therapy: Mobilization, ROM, ADL's  Occupational Therapy: ADL's  Labs: Trend Hgb on POD #1, 2  Consults: PT, OT. Hospitalist, appreciate assistance in caring for this patient throughout the perioperative period-     Disposition: Pending progress with therapies, pain control on orals, and medical stability; anticipate discharge to likely TCU on POD3-5.    Orthopedic surgery staff for this patient is Dr. Meyers.    ------------------------------------------------------------------------------------------    Damian Frank MD  Orthopedic Surgery PGY4  [phone]      FOLLOWUP:    Future Appointments   Date Time Provider Department Center   11/23/2022  8:15 AM Aisha Prakash Pt, PT URPT [place]   11/23/2022 11:15 AM Wendy Betancur OT UROT [place]   12/12/2022 11:45 AM Curt Mariano, [facility]

## 2022-11-25 NOTE — PROGRESS NOTES
Orthopedic surgery brief progress note    I was paged regarding critical lab value, hemoglobin 6.3.    1 unit packed red blood cells ordered.    Continue to hold Eliquis this morning.    Medicine also paged by RN, I discussed immediate blood transfusion, with monitoring of systemic symptoms, pulse currently 71, blood pressure 122/43.    Damian Frank MD  PGY-4 Orthopaedic Surgery

## 2022-11-25 NOTE — PROGRESS NOTES
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 Internal Medicine was consulted for post-operative medical management.      Chronically infected left total hip arthroplasty now s/p explantation + extended debridement/reconstruction with antibiotic cement spacer (11/22/22)  -Antibiotics per primary team and ID  - cont Vanc & pip/tazo  -Post-op pain control per primary team  -Drain management per primary team    follow-up cultures    Currently growing pan-sensitive proteus     Stopping Vancomycin today    Continue to monitor cultures, can likely switch to Unasyn vs CTX (to reduce Na load; but will confirm with ID first)         #Normocytic anemia (?anemia of chronic disease) with acute blood loss anemia- pre-operatively had hgb of 9.9 on 11/14. 11/22 was 9.6-9.2. Intra-op trended down to 7 so he was transfused intra-operatively as well as post-op  Hgb this morning 6.3    Give 1 unit of PRBC again today.  -Trend in AM  -transfuse if <7  -Consider outpatient iron studies      Acute hypoxic respiratory failure    - suspect secondary to atelectasis, hypoventilation in setting of JAIR.   - Lungs largely clear to auscultation   - Does not wear O2 at home    incentive spirometry, monitor respiratory rate with continuous oximetry    oxymask as needed    lasix 20 mg bid today x1    #HALEY - improving  Likely pre-renal given volume loss; though patient appears hypervolemic  - cont to monitor       JAIR with obesity- home CPAP- see above     DM2  - PTA med list includes metformin, glipizide,  pioglitazone, victoza  -hold home meds presently  -sliding scale insulin TID with meals and at bedtime but in the days just prior to discharge, could consider resuming a few of his home diabetes meds  -A1c 6.8 this admission  -may be able to add some glargine once insulin needs more delineated     CAD- continue statin, hold aspirin until cleared by surgery team     #History of DVT/PE  - previously on DOAC (eliquis) but this was held perioperatively  -holding Eliquis while monitoring CBC  -If Eliquis to be resumed, the schedule is as below    Likely to resume Eliquis today if hgb is stable    Eliquis 2.5 mg bid x14 days and then eliquis 5 mg bid        #Venous insufficiency with signs of stasis dermatitis  Erythematous patches on bilateral lower extremities - left anterior lower leg and smaller area on right lower leg  Low suspicion for cellulitis and more consistent with chronic stasis dermatitis  - PTA med list includes furosemide resumed today  - compression stockings          #Respiratory acidosis-- intra-operative blood gases with high CO2 and low pH. Will monitor closely with post-op capnography if able  #Hypocalcemia- will check ionized calcium in AM- could be due to blood products or could be due to low albumin  -recheck in AM  #Hyperkalemia- mild and intermittent on the intra-op labs. Asymptomatic  -recheck in AM.        Diet: Advance Diet as Tolerated: Regular Diet Adult    DVT Prophylaxis: Pneumatic Compression Devices; holding home eliquis given blood loss anemia  Tran Catheter: Not present  Central Lines: None  Cardiac Monitoring: None  Code Status: Full Code      Disposition Plan         The patient's care was discussed with the Patient.    TAB PARRA MD  Hospitalist Service, [address]  Securely message with the Vocera Web Console (learn more here)  Text page via [employer] Paging/Directory   Please see signed in provider for up to date  "coverage information      Clinically Significant Risk Factors              # Hypoalbuminemia: Lowest albumin = 2.4 g/dL (Ref range: 3.5-5.2) at 11/23/2022  6:12 AM, will monitor as appropriate         # DMII: A1C = 6.8 % (Ref range: 0.0 - 5.6 %) within past 3 months, PRESENT ON ADMISSION  # Severe Obesity: Estimated body mass index is 46.53 kg/m  as calculated from the following:    Height as of this encounter: 1.778 m (5' 10\").    Weight as of this encounter: 147.1 kg (324 lb 4.8 oz)., PRESENT ON ADMISSION         ______________________________________________________________________    Interval History   Remains on supplemental O2  Does not wear O2 at home  No chest pain or abdominal pain  Continues to report soreness at surgical site.  No hematuria, no bloody BM     Data reviewed today: I reviewed all medications, new labs and imaging results over the last 24 hours.      Physical Exam   Vital Signs: Temp: 98.7  F (37.1  C) Temp src: Oral BP: 122/43 Pulse: 71   Resp: 16 SpO2: 99 % O2 Device: Nasal cannula Oxygen Delivery: 3 LPM  Weight: 324 lbs 4.75 oz    General Appearance: Obese male, lying in bed, comfortably in bed, on room air, in no acute distress of discomfort  HEENT: PERRL: EOMI; moist mucous membrane w/o lesions  Neck: No JVD  Pulmonary: Clear to auscultation bilaterally, no wheezes or crackles  CVS: Regular rhythm, no murmurs, rubs or gallops  GI: BS (+), soft nontender, no rebound or guarding   Extremities: Left lateral hip in dressing c/d/i  Skin: No rashes or lesions  Neurologic: A&O x3      Data   Recent Labs   Lab 11/25/22  0316 11/24/22  2128 11/24/22  1711 11/24/22  0421 11/24/22  0232 11/23/22  1748 11/23/22  1458 11/23/22  0939 11/23/22  0612 11/22/22 2016 11/22/22  1925 11/22/22  1906 11/22/22  1644 11/22/22  1407 11/22/22  1330   HGB  --   --   --   --  7.1*  --  6.6*  --  6.8*  --  7.6*  --  9.2*   < >  --    NA  --   --   --   --   --   --   --   --  137  --  139  --  140   < > 139 "   POTASSIUM  --   --   --   --   --   --   --   --  5.8*  --  5.3*  --  4.7   < > 4.4   CHLORIDE  --   --   --   --   --   --   --   --  106  --   --   --   --   --  108   CO2  --   --   --   --   --   --   --   --  28  --   --   --   --   --  27   BUN  --   --   --   --   --   --   --   --  30  --   --   --   --   --  26   CR  --   --   --   --  1.27*  --  1.29*  --  1.29*  --   --   --   --   --  0.92   ANIONGAP  --   --   --   --   --   --   --   --  3  --   --   --   --   --  4   MAIKOL  --   --   --   --   --   --   --   --  7.6*  --   --   --   --   --  8.2*   * 293* 279*   < >  --    < >  --    < > 258*   < > 217*   < > 205*   < > 153*   ALBUMIN  --   --   --   --   --   --   --   --  2.4*  --   --   --   --   --   --    PROTTOTAL  --   --   --   --   --   --   --   --  5.6*  --   --   --   --   --   --    BILITOTAL  --   --   --   --   --   --   --   --  0.2  --   --   --   --   --   --    ALKPHOS  --   --   --   --   --   --   --   --  56  --   --   --   --   --   --    ALT  --   --   --   --   --   --   --   --  19  --   --   --   --   --   --    AST  --   --   --   --   --   --   --   --  48*  --   --   --   --   --   --     < > = values in this interval not displayed.     No results found for this or any previous visit (from the past 24 hour(s)).  Medications       acetaminophen  975 mg Oral Q8H     aspirin  162 mg Oral Daily     atorvastatin  40 mg Oral Daily     furosemide  20 mg Oral BID     insulin aspart  1-7 Units Subcutaneous TID AC     insulin aspart  1-5 Units Subcutaneous At Bedtime     piperacillin-tazobactam  3.375 g Intravenous Q6H     polyethylene glycol  17 g Oral Daily     senna-docusate  1 tablet Oral BID     sodium chloride (PF)  3 mL Intracatheter Q8H     vancomycin  1,500 mg Intravenous Q24H

## 2022-11-25 NOTE — PROGRESS NOTES
General ID Service: Follow Up Note      Patient:  Waldo Bustos, Date of birth 1950, Medical record number [id number]  Date of Visit:  November 25, 2022         Assessment and Recommendations:   ID Problem List:    1. Chronic Left hip PJI   - s/p  Explantation of Chronic  Infected Left Total Hip Arthroplasty, Extended Trochanteric Osteotomy with Extensive Debridement and Reconstruction Using Antibiotic Cement Spacer on 11/22/2022 .  intra op left hip tissues cultures Proteus mirabilis  - 11/14/22 - left hip aspirate 11/14/22 - 3+ Proteus mirabilis , 4+ Porphyromonas sp.      2. Left hip arthroplasty 3/7/2018   - 1/23/2019, revision left total hip arthroplasty . Cutibacterium acnes s/p  4 weeks of Ceftriaxone  - 2/2021, L hip aspiration   - 4/6/2022, C acnes  - 4/27/2022, drain placement. C acnes.     3. Post Op anemia Hb 6.6   4. Acute renal insufficiency Cr 1.29  5.  Diabetes mellitus HbA1c 6.6  6. CAD   7. Obesity BMI 46  8. JAIR   9. Hx of DVT/PE   10. venous stasis / venous insufficiency    Recommendations:    -stop vancomycin given Proteus and anaerobes are covered by zosyn  -can discharge patient on oral ciprofloxacin 500 mg bid and flagyl 500 mg TID for at least 6 weeks from 11/22  -follow up with ID in 5 weeks, ideally ~1 week prior to completion of antibiotics. Should see Dr. Mcqueen, myself, or fellows' clinic.    Discussion:    Patient with PJI growing Proteus mirabilis and anaerobes. He had his prosthesis removed and a spacer placed. His proteus is pan sensitive and he has no allergies except sulfa. He does not need vancomycin at this time. Zosyn will cover all of the bacteria that is growing. Given the excellent bioavailability of po ciprofloxacin and flagyl he does not need IV antibiotics. I considered rifampin but since the hardware was removed he shouldn't need it. Also it would interact with his Eliquis so it is likely a bad idea. He should get at least 6 weeks of antibiotics.     He should  see us in ID prior to completion of antibiotics. Either Dr. Mcqueen, myself, or in the fellow's clinic.    We will sign off. Don't hesitate to call with questions.     Attestation:  I have reviewed today's vital signs, medications, labs and imaging.  Gypsy Moon MD  Pager [phone]          Interval History:       Patient reports he is really tired. He had a critical Hgb so he is currently getting a unit of blood infused.   No fevers or chills. No other events         Review of Systems:   Full 9 pt ROS obtained, pertinent positives and negatives as above.          Current Antimicrobials   Current:    Vancomycin and zosyn         Physical Exam:   Ranges for vital signs:  Temp:  [97.1  F (36.2  C)-99.4  F (37.4  C)] 97.1  F (36.2  C)  Pulse:  [71-84] 73  Resp:  [12-18] 12  BP: (104-128)/(34-43) 106/36  SpO2:  [96 %-100 %] 98 %    Intake/Output Summary (Last 24 hours) at 11/25/2022 1151  Last data filed at 11/25/2022 1100  Gross per 24 hour   Intake 600 ml   Output 1150 ml   Net -550 ml     Exam:  GENERAL:  well-developed, well-nourished, sitting in bed in no acute distress.   ENT:  Head is normocephalic, atraumatic. Oropharynx is moist without exudates or ulcers.  EYES:  Eyes have anicteric sclerae.    NECK:  Supple.  LUNGS:  Clear to auscultation.  CARDIOVASCULAR:  Regular rate and rhythm with no murmurs, gallops or rubs.  ABDOMEN:  Normal bowel sounds, soft, nontender.  EXT: Extremities warm and without edema.  SKIN:  No acute rashes.  Line is in place without any surrounding erythema.  NEUROLOGIC:  Grossly nonfocal.         Laboratory Data:   Reviewed.  Pertinent for:    Hgb 6.3      Culture data:    11/22/2022 1611 11/25/2022 0857 Tissue Aerobic Bacterial Culture Routine [[id number]]    (Abnormal)   Tissue from Hip, Left    Final result Component Value   Culture 1+ Proteus mirabilis Abnormal        Susceptibility     Proteus mirabilis     MARCOS     Ampicillin <=2 ug/mL Susceptible     Ampicillin/ Sulbactam <=2  ug/mL Susceptible     Cefepime <=1 ug/mL Susceptible     Ceftazidime <=1 ug/mL Susceptible     Ceftriaxone <=1 ug/mL Susceptible     Ciprofloxacin <=0.25 ug/mL Susceptible     Gentamicin 2 ug/mL Susceptible     Levofloxacin <=0.12 ug/mL Susceptible     Meropenem <=0.25 ug/mL Susceptible     Piperacillin/Tazobactam <=4 ug/mL Susceptible     Tobramycin 2 ug/mL Susceptible     Trimethoprim/Sulfamethoxazole <=1/19 ug/mL Susceptible            Growing in 6 aerobic cultures    11/14 11/14/2022 1235 11/24/2022 1312 Anaerobic bacterial culture [[id number]]    (Abnormal)   Aspirate from Hip, Left    Preliminary result Component Value   Culture 4+ Porphyromonas species Abnormal  P    Beta Lactamase Positive   Identification obtained by MALDI-TOF mass spectrometry research use only database. Test characteristics determined and verified by the [facility].    1+ Finegoldia magna Abnormal  C       Susceptibility     Porphyromonas species     MARCOS     Amoxicillin/Clavulanate 0.032 ug/mL Susceptible     Cefotaxime 0.19 ug/mL Susceptible     Meropenem 0.032 ug/mL Susceptible     metronidazole 0.023 ug/mL Susceptible                  11/14/2022 1235 11/16/2022 2150 Aspirate Aerobic Bacterial Culture Routine [[id number]]    (Abnormal)   Aspirate from Hip, Left    Final result Component Value   Culture 3+ Proteus mirabilis Abnormal        Susceptibility     Proteus mirabilis     MARCOS     Ampicillin <=2 ug/mL Susceptible     Ampicillin/ Sulbactam <=2 ug/mL Susceptible     Cefepime <=1 ug/mL Susceptible     Ceftazidime <=1 ug/mL Susceptible     Ceftriaxone <=1 ug/mL Susceptible     Ciprofloxacin <=0.25 ug/mL Susceptible     Gentamicin 4 ug/mL Susceptible     Levofloxacin <=0.12 ug/mL Susceptible     Meropenem <=0.25 ug/mL Susceptible     Piperacillin/Tazobactam <=4 ug/mL Susceptible     Tobramycin 2 ug/mL Susceptible     Trimethoprim/Sulfamethoxazole <=1/19 ug/mL Susceptible                     Imaging:      XR Pelvis Port 1/2 Views [[id number]] Collected: 11/22/22 2149   Order Status: Completed Updated: 11/22/22 2202   Narrative:     EXAM: XR PELVIS PORT 1/2 VIEWS   LOCATION: [facility]   DATE/TIME: 11/22/2022 9:49 PM     INDICATION: Status post hip surgery.   COMPARISON: 11/14/2022    Impression:     IMPRESSION: Postop changes related to explantation of left total hip arthroplasty with proximal left femur osteotomy transfixed with 2 cerclage wires. Presumably antibiotic impregnated cement spacer present in the acetabular fossa. A surgical drain   projects over the operative site. Unchanged right hip joint space. No displaced pelvic fracture identified. Several chronic bone fragments project along the inferior margin of the left ischial tuberosity. Arterial calcification. Expected postop soft   tissue gas and swelling about the left hip.

## 2022-11-25 NOTE — PROGRESS NOTES
"SURGION CAME THIS MORNING FOR HIS ROUNDS INFORMED OF PATIENT REFUSAL TO TURN OR CHANGE urine linen. Patient refused to use the urinal . Patient was medicated to help with turning get 3 nurses but he refused saying it is painful and was yelling at us. Pt says he knows all about hip problem this is his 3rd surgery and aware of skin problems, and he says \"I would rather lay on urine than turn, I will wait till am able to turn more\". Pt informed that might take time and damage the skin. The surgeon said we have to keep asking to assist but if he does not want we cannot force him. Pt verbal understanding. Special mattress and bariatric bed ordered by Madelaine LÓPEZ. PT HAS NOT TURNED SINCE SURGERY. DOCTOR AWARE.  "

## 2022-11-25 NOTE — PLAN OF CARE
Goal Outcome Evaluation:      Plan of Care Reviewed With: patient    Overall Patient Progress: improvingOverall Patient Progress: improving    Outcome Evaluation: Pt is not cooperative with care, refuses to turn and change wet linnen. Left hip dressing intact swollen all leg and right leg, right hand and scrotrum.    Patient vital signs are at baseline: No,  Reason:  On 3 liters oxygen  Patient able to ambulate as they were prior to admission or with assist devices provided by therapies during their stay:  No,  Reason:  A lot of pain has not gotten out of bed   Patient MUST void prior to discharge:  Yes  Patient able to tolerate oral intake:  Yes  Pain has adequate pain control using Oral analgesics:  Yes  Does patient have an identified :  Yes  Has goal D/C date and time been discussed with patient:  Yes     VS: Temp: 98.7  F (37.1  C) Temp src: Oral BP: 122/43 Pulse: 71   Resp: 16 SpO2: 99 % O2 Device: Nasal cannula Oxygen Delivery: 3 LPM       O2: DIMINISHED . LS clear and equal bilaterally. Denies chest pain and SOB.    Output: Voids spontaneously without difficulty to bathroom / using beside urinal DID NOT WANT TO USE URINAL pt is incontinent and lying on wet linen. Consulted the surgeon teaching done pt did not want to move says a lot of pain. Options given on pain medications says no.   Last BM: 11/22/22 denies abdominal discomfort. BS active / passing flatus.    Activity: Has not being able to get out of bed. Left leg non weight bearing   Skin: WDL except, left hip incision, swollen both legs some opening on the shin, right hand pain and slight swollen.   Pain: Pain managed with Oxy and Tylenol doen not want anything else   CMS: Intact, AOx4.Both hands numbness and tingling.   Dressing: C/D/I   Diet: Regular diet. Denies nausea/vomiting.   BG checks before meals and at bedtime. BG check at bedtime  BG overnight   LDA:  PIV SL   Equipment: IV pole, personal belongings,    Plan: FALL and skin break down  precautions maintained / Continue with plan of care. Call light within reach, pt able to make needs known.    Additional Info:

## 2022-11-26 NOTE — PLAN OF CARE
"Goal Outcome Evaluation:      Plan of Care Reviewed With: patient    Overall Patient Progress: improvingOverall Patient Progress: improving    Outcome Evaluation: some pain control obtained. Blood transfusion done today. working on BM and new bed with lift room      VS: /54   Pulse 74   Temp 99.1  F (37.3  C) (Axillary)   Resp 12   Ht 1.778 m (5' 10\")   Wt 147.1 kg (324 lb 4.8 oz)   SpO2 95%   BMI 46.53 kg/m    Denies SOB, CP, new N/T   O2: 3 LPM via NC. CXR done. Using IS when encouraged   Output: Voiding into primofit external catheter, encouraging intake   Last BM: 11/22, took bowel meds, denies discomfort. BS+. Fl+. Declined MOM. Refused to transfer to commode, Refused bedpan   Activity: Refusing repositioning. Continue to encourage. Pt educated on importance of activity and concerns with remaining sedetary.   Assist x3 to roll.   Sat at edge of bed with PT with assist x3-4   Skin: Blisters to inner L thigh, MD aware. Javy BLE. L hip incision. Scabs to BLE shins   Pain: C/o 8/10 pain with any movement. Gave pt 5 mg oxy, tylenol, robaxin, atarax.    CMS: AxO, tired but alert today. Arouses easy to voice. More wakeful periods than yesterday. Numbness continued to R hand, BLE calves. Reports calf numbness has been ongoing for past 12 years, no calf tenderness   Dressing: CDI   Diet: Reg, encouraged intake  , 210   LDA: GUERRERO drain 100 out day shift. New R PIV SL   Equipment: PCDs. Urinal. Personal belongings  Ordered bariatric pulsate mattress   Plan: Encourage movement, monitoring hgb, manage pain.   Additional Info: Pt low energy today, hgb 6.8, transfused 1 unit. Trend Hgb. Pt denied SI.   1840: Obtained sizewise bariatric bed with pulsate mattress, set up in lift room for pt, pt wished to try commode as to bedpan. Plan was to lift into commode, then to new bed. Sling under pt and pt refused staff to lift with sling to transfer. Pt reports he did not trust the sling, and that \"it will squish my " "nuts.\" Staff assured pt that this is safe to transfer with sling, as PT set the sling up with hip precaution extender to L side. Staff also educated pt about options to sling scrotum to provide protection. Pt refused transfer. Pt educated on PI risks with laying on sling on current bed. Pt verbalized understanding and continues to refuse to transfer, or to turn to remove sling. Will pass onto next staff.       "

## 2022-11-26 NOTE — PROGRESS NOTES
VS: Vitals stable   O2: Sating 97 % on O2 3LNC then 4L via CPAP. Lung sounds clear. Denies chest pain and SOB.   Output: Voids spontaneously and adequately primofit in place   Last BM: 11/22. Bowel sounds active x4. Passing flatus.   Activity: No oob this shift   Skin: Javy daniella LE, blisters Lt inner thigh, Lt hip surgical incision   Pain: Pain was managed with Robaxin and atarax   Neuro: A&O x4. Denies N/T.   Dressing: Dressing to Lt hip C/D/I.   Diet: Regular. Appetite was good.  at 2am. Denies N/V.   LDA: PIV to Rt hand is SL.   Equipment: IV pole, gait belt and personal belongings.   Plan: Continue to monitor. Call light within reach and utilized appropriately   Additional Info:      06:50 Pt ripped off primofit and spilled some urine. Pt cleaned but refused to roll on his side so the marcelo could be changed. Managed to remove wet marcelo wipe and put new marcelo under pt without rolling. Charge reminded that pt needs a room that has a lift.

## 2022-11-26 NOTE — PROGRESS NOTES
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 Internal Medicine was consulted for post-operative medical management.      Chronically infected left total hip arthroplasty now s/p explantation + extended debridement/reconstruction with antibiotic cement spacer (11/22/22)  -Antibiotics per primary team and ID  - cont Vanc & pip/tazo  -Post-op pain control per primary team  -Drain management per primary team    follow-up cultures    Currently growing pan-sensitive proteus     Stopped Vancomycin    Cont Zosyn    Once close to discharge, will recommend switching patient to oral ciprofloxacin 500 mg bid and flagyl 500 mg TID for at least 6 weeks from 11/22     #Normocytic anemia (?anemia of chronic disease) with acute blood loss anemia- pre-operatively had hgb of 9.9 on 11/14. 11/22 was 9.6-9.2. Intra-op trended down to 7 so he was transfused intra-operatively as well as post-op  Hgb this morning 6.3    Give 1 unit of PRBC again today.  -Trend in AM  -transfuse if <7  -Consider outpatient iron studies      Acute hypoxic respiratory failure    - Most largely 2/to hypoventilation with background resistive lung disease given body habitus.  - VBG unremarkable w/o hypercarbia  - Lungs largely clear to auscultation bilaterally   - Does not wear O2 at home    incentive spirometry, monitor respiratory rate with continuous oximetry    oxymask as needed    lasix 20 mg bid (increased from home dose    #HALEY - improving  Likely pre-renal given volume loss; though patient appears  hypervolemic  - cont to monitor       JAIR with obesity- home CPAP- see above     DM2  - PTA med list includes metformin, glipizide, pioglitazone, victoza  -hold home meds presently  -sliding scale insulin TID with meals and at bedtime but in the days just prior to discharge, could consider resuming a few of his home diabetes meds  -A1c 6.8 this admission  -may be able to add some glargine once insulin needs more delineated     CAD  - continue statin, hold aspirin until cleared by surgery team     #History of DVT/PE  - previously on DOAC (eliquis) but this was held perioperatively  -holding Eliquis while monitoring CBC  -If Eliquis to be resumed, the schedule is as below    Likely to resume Eliquis today if hgb is stable    Eliquis 2.5 mg bid x14 days and then eliquis 5 mg bid        #Venous insufficiency with signs of stasis dermatitis  Erythematous patches on bilateral lower extremities - left anterior lower leg and smaller area on right lower leg  Low suspicion for cellulitis and more consistent with chronic stasis dermatitis  - PTA med list includes furosemide resumed today  - compression stockings     #Lack of motivation  - depression screen negative    Trial of low dose ritalin (not home med)     #Respiratory acidosis-- intra-operative blood gases with high CO2 and low pH. Resolved.   #Hypocalcemia- will check ionized calcium in AM- could be due to blood products or could be due to low albumin  -recheck in AM  #Hyperkalemia- mild and intermittent on the intra-op labs. Asymptomatic  -recheck in AM.        Diet: Advance Diet as Tolerated: Regular Diet Adult    DVT Prophylaxis: Pneumatic Compression Devices; holding home eliquis given blood loss anemia  Tran Catheter: Not present  Central Lines: None  Cardiac Monitoring: None  Code Status: Full Code      Disposition Plan         The patient's care was discussed with the Patient.    TAB PARRA MD  Hospitalist Service, GOLD TEAM [address] [place] [address]  "[facility]  Securely message with the Vocera Web Console (learn more here)  Text page via [facility] Paging/Directory   Please see signed in provider for up to date coverage information      Clinically Significant Risk Factors              # Hypoalbuminemia: Lowest albumin = 2.4 g/dL (Ref range: 3.5-5.2) at 11/23/2022  6:12 AM, will monitor as appropriate         # DMII: A1C = 6.8 % (Ref range: 0.0 - 5.6 %) within past 3 months   # Severe Obesity: Estimated body mass index is 46.53 kg/m  as calculated from the following:    Height as of this encounter: 1.778 m (5' 10\").    Weight as of this encounter: 147.1 kg (324 lb 4.8 oz).          ______________________________________________________________________    Interval History   Received x1 unit prbc yesterday for hgb 6.3; hgb this morning 6.8 getting another unit of blood.   HALEY has since improved  Remains on supplemental O2    Data reviewed today: I reviewed all medications, new labs and imaging results over the last 24 hours.      Physical Exam   Vital Signs: Temp: 97.6  F (36.4  C) Temp src: Oral BP: 139/53 Pulse: 78   Resp: 16 SpO2: 98 % O2 Device: BiPAP/CPAP Oxygen Delivery: 4 LPM  Weight: 324 lbs 4.75 oz    General Appearance: Obese male, lying in bed, comfortably in bed, on room air, in no acute distress of discomfort  HEENT: PERRL: EOMI; moist mucous membrane w/o lesions  Neck: No JVD  Pulmonary: Clear to auscultation bilaterally, no wheezes or crackles  CVS: Regular rhythm, no murmurs, rubs or gallops  GI: BS (+), soft nontender, no rebound or guarding   Extremities: Left lateral hip in dressing c/d/i  Skin: No rashes or lesions  Neurologic: A&O x3      Data   Recent Labs   Lab 11/26/22  0641 11/26/22  0211 11/25/22  2210 11/25/22  1806 11/25/22  1010 11/25/22  0911 11/24/22  0421 11/24/22  0232 11/23/22  0939 11/23/22  0612 11/22/22 2016 11/22/22  1925 11/22/22  1407 11/22/22  1330   WBC  --   --   --   --   --  13.0*  --   --   --   " --   --   --   --   --    HGB 6.8*  --   --   --   --  6.3*  --  7.1*   < > 6.8*  --  7.6*   < >  --    MCV  --   --   --   --   --  95  --   --   --   --   --   --   --   --    PLT  --   --   --   --   --  245  --   --   --   --   --   --   --   --    NA  --   --   --   --   --  139  --   --   --  137  --  139   < > 139   POTASSIUM  --   --   --   --   --  4.5  --   --   --  5.8*  --  5.3*   < > 4.4   CHLORIDE  --   --   --   --   --  108  --   --   --  106  --   --   --  108   CO2  --   --   --   --   --  25  --   --   --  28  --   --   --  27   BUN  --   --   --   --   --  17  --   --   --  30  --   --   --  26   CR 1.08  --   --   --   --  1.09  --  1.27*   < > 1.29*  --   --   --  0.92   ANIONGAP  --   --   --   --   --  6  --   --   --  3  --   --   --  4   MAIKOL  --   --   --   --   --  8.0*  --   --   --  7.6*  --   --   --  8.2*   GLC  --  210* 213* 202*   < > 217*   < >  --    < > 258*   < > 217*   < > 153*   ALBUMIN  --   --   --   --   --   --   --   --   --  2.4*  --   --   --   --    PROTTOTAL  --   --   --   --   --   --   --   --   --  5.6*  --   --   --   --    BILITOTAL  --   --   --   --   --   --   --   --   --  0.2  --   --   --   --    ALKPHOS  --   --   --   --   --   --   --   --   --  56  --   --   --   --    ALT  --   --   --   --   --   --   --   --   --  19  --   --   --   --    AST  --   --   --   --   --   --   --   --   --  48*  --   --   --   --     < > = values in this interval not displayed.     No results found for this or any previous visit (from the past 24 hour(s)).  Medications       aspirin  162 mg Oral Daily     atorvastatin  40 mg Oral Daily     insulin aspart  1-7 Units Subcutaneous TID AC     insulin aspart  1-5 Units Subcutaneous At Bedtime     piperacillin-tazobactam  3.375 g Intravenous Q6H     polyethylene glycol  17 g Oral Daily     senna-docusate  1 tablet Oral BID     sodium chloride (PF)  3 mL Intracatheter Q8H

## 2022-11-26 NOTE — PROGRESS NOTES
"Orthopedic Surgery Progress Note: 11/26/2022    Subjective:   Patient amenable to some nursing cares overnight.  External male urinary catheter put in place.  Now excepting turns, bedsheet changes.    Required blood transfusion on 11/25, hemoglobin 6.3.  1 unit PRBC, a.m. hemoglobin today 6.8.  Asymptomatic.  Will transfuse.    Discussed continuation of anticoagulation hold until hemoglobin stabilized.    Tolerating a diet. No new concerns or complaints. Denies SOB, chest Pain, fevers, chills. HR is 76 during examination.       Objective:   /53 (BP Location: Left arm)   Pulse 76   Temp 98.7  F (37.1  C) (Oral)   Resp 16   Ht 1.778 m (5' 10\")   Wt 147.1 kg (324 lb 4.8 oz)   SpO2 95%   BMI 46.53 kg/m    I/O this shift:  In: 240 [P.O.:240]  Out: 500 [Urine:400; Drains:100]  General: NAD. Resting comfortably in bed.  Respiratory: Breathing comfortably on RA.  Musculoskeletal:    Focused Exam of Left Lower Extremity  Dressing C/D/I.   Fires EHL, FHL, TA, GSC, Quad  SILT DP, SP, Saph, Sural, Tibial Nerve Distributions  1+ DP pulse, foot WWP. 1+ edema leg.     Output by Drain (mL) 11/24/22 0700 - 11/24/22 1459 11/24/22 1500 - 11/24/22 2259 11/24/22 2300 - 11/25/22 0659 11/25/22 0700 - 11/25/22 1459 11/25/22 1500 - 11/25/22 2259 11/25/22 2300 - 11/26/22 0645   Closed/Suction Drain 1 Left Hip Bulb 15 Icelandic  130 80 40  100         Laboratory Data:  Lab Results   Component Value Date    WBC 13.0 (H) 11/25/2022    HGB 6.3 (LL) 11/25/2022     11/25/2022         Assessment & Plan:   Waldo Bustos is a 72 year old male s/p Explantation of Chronic  Infected Left Total Hip Arthroplasty, Extended Trochanteric Osteotomy with Extensive Debridement and Reconstruction Using Antibiotic Cement Spacer on 11/22/2022 with Dr. Meyers.     11/26: Acute blood loss anemia, with recent surgery. Pre-renal HALEY, resolved.  - Drain to remain in place  -Continue to hold Eliquis.  Okay for aspirin.  - Hemoglobin today 6.8, 1 unit " packed red blood cells ordered  -  If Hgb >7, ok to resume Eliquis. Aspirin 162 mg daily started on 11/24 for DVT prophylaxis.  When safe to resume Eliquis, discontinue aspirin  - ID final recommendations in (see below), continue Zosyn while in hospital  - Continue to BladderScan per protocol, patient subjectively voiding.    Cultures: Proteus mirabilis    ----Infectious disease recommendations below----  -stop vancomycin given Proteus and anaerobes are covered by zosyn  -can discharge patient on oral ciprofloxacin 500 mg bid and flagyl 500 mg TID for at least 6 weeks from 11/22  -follow up with ID in 5 weeks, ideally ~1 week prior to completion of antibiotics      Activity: Up with assist and assistive devices as needed until independent.  Weight bearing status: NWB LLE  Antibiotics: Per ID, Zosyn in-house, for discharge oral ciprofloxacin 500 mg bid and flagyl 500 mg TID   Diet: Begin with clear fluids and progress diet as tolerated. Bowel regimen. Anti-emetics PRN.    DVT prophylaxis:  mg (current), then Eliquis 2.5 mg BID x2 weeks, then 5 mg BID, held for anemia  Elevation: Elevate heels off of bed on pillows   Wound Care: Alginate, tegaderm to be changed PRN by ortho  Drains: 15f GUERRERO drain  Tran: Removed POD1  Pain management: Orals PRN, IV for breakthrough only  X-rays: AP Pelvis and lateral in PACU complete  Physical Therapy: Mobilization, ROM, ADL's  Occupational Therapy: ADL's  Labs: Trend Hgb on POD #1, 2  Consults: PT, OT. ID. Hospitalist, appreciate assistance in caring for this patient throughout the perioperative period-     Disposition: Pending progress with therapies, pain control on orals, and medical stability; anticipate discharge to likely TCU after Hgb stabilized. ID plan in place.     Orthopedic surgery staff for this patient is Dr. Meyers.    ------------------------------------------------------------------------------------------    Damian Frank MD  Orthopedic Surgery  PGY4  [phone]      FOLLOWUP:    Future Appointments   Date Time Provider Department Center   11/23/2022  8:15 AM Aisha Prakash Pt, PT [facility] [place]   11/23/2022 11:15 AM Wendy Betancur, OT [facility] [place]   12/12/2022 11:45 AM Curt Mariano, D[facility]

## 2022-11-27 NOTE — PROGRESS NOTES
"Orthopedic Surgery Progress Note: 11/27/2022    Subjective:   Patient amenable to some nursing cares overnight.  External male urinary catheter put in place.  Now excepting turns, bedsheet changes.    Required blood transfusion on 11/26, hemoglobin 6.8 > 1 unit PRBC. Asymptomatic. Will get AM labs today     Discussed continuation of anticoagulation hold until hemoglobin stabilized.    Tolerating a diet. No new concerns or complaints. Denies SOB, chest Pain, fevers, chills. HR is 70 during examination.       Objective:   /49 (BP Location: Left arm, Patient Position: Semi-Fuentes's)   Pulse 76   Temp 97.2  F (36.2  C) (Axillary)   Resp 16   Ht 1.778 m (5' 10\")   Wt 147.1 kg (324 lb 4.8 oz)   SpO2 92%   BMI 46.53 kg/m    I/O this shift:  In: 120 [P.O.:120]  Out: 750 [Urine:650; Drains:100]  General: NAD. Resting comfortably in bed.  Respiratory: Breathing comfortably on RA.  Musculoskeletal:    Focused Exam of Left Lower Extremity  Dressing C/D/I.   Fires EHL, FHL, TA, GSC, Quad  SILT DP, SP, Saph, Sural, Tibial Nerve Distributions  1+ DP pulse, foot WWP. 1+ edema leg.     Output by Drain (mL) 11/25/22 0700 - 11/25/22 1459 11/25/22 1500 - 11/25/22 2259 11/25/22 2300 - 11/26/22 0659 11/26/22 0700 - 11/26/22 1459 11/26/22 1500 - 11/26/22 2259 11/26/22 2300 - 11/27/22 0637   Closed/Suction Drain 1 Left Hip Bulb 15 [place] 40  100  100 100         Laboratory Data:  Lab Results   Component Value Date    WBC 13.0 (H) 11/25/2022    HGB 6.8 (LL) 11/26/2022     11/25/2022         Assessment & Plan:   Waldo Bustos is a 72 year old male s/p Explantation of Chronic  Infected Left Total Hip Arthroplasty, Extended Trochanteric Osteotomy with Extensive Debridement and Reconstruction Using Antibiotic Cement Spacer on 11/22/2022 with Dr. Meyers.     11/27: Acute blood loss anemia, with recent surgery. Pre-renal HALEY, resolved.  - Drain to remain in place  - Continue to hold Eliquis.  Okay for aspirin.  - Hemoglobin " today, transfuse if <7  -  If Hgb >7, ok to resume Eliquis. Aspirin 162 mg daily started on 11/24 for DVT prophylaxis.  When safe to resume Eliquis, discontinue aspirin  - ID final recommendations in (see below), continue Zosyn while in hospital    Cultures: Proteus mirabilis    ----Infectious disease recommendations below----  -stop vancomycin given Proteus and anaerobes are covered by zosyn  -can discharge patient on oral ciprofloxacin 500 mg bid and flagyl 500 mg TID for at least 6 weeks from 11/22  -follow up with ID in 5 weeks, ideally ~1 week prior to completion of antibiotics      Activity: Up with assist and assistive devices as needed until independent.  Weight bearing status: NWB LLE  Antibiotics: Per ID, Zosyn in-house, for discharge oral ciprofloxacin 500 mg bid and flagyl 500 mg TID   Diet: Begin with clear fluids and progress diet as tolerated. Bowel regimen. Anti-emetics PRN.    DVT prophylaxis:  mg (current), then Eliquis 2.5 mg BID x2 weeks, then 5 mg BID, held for anemia  Elevation: Elevate heels off of bed on pillows   Wound Care: Alginate, tegaderm to be changed PRN by ortho  Drains: 15f GUERRERO drain  Tran: Removed POD1  Pain management: Orals PRN, IV for breakthrough only  X-rays: AP Pelvis and lateral in PACU complete  Physical Therapy: Mobilization, ROM, ADL's  Occupational Therapy: ADL's  Labs: Trend Hgb on POD #1, 2  Consults: PT, OT. ID. Hospitalist, appreciate assistance in caring for this patient throughout the perioperative period-     Disposition: Pending progress with therapies, pain control on orals, and medical stability; anticipate discharge to likely TCU after Hgb stabilized. ID plan in place.     Orthopedic surgery staff for this patient is Dr. Meyers.    ------------------------------------------------------------------------------------------    Damian Frank MD  Orthopedic Surgery PGY4  [phone]      FOLLOWUP:    Future Appointments   Date Time Provider Department  [place]   11/23/2022  8:15 AM Aisha Prakash Pt, PT [facility] [place]   11/23/2022 11:15 AM Wendy Betancur, OT [facility] [place]   12/12/2022 11:45 AM Curt Mariano, DIA[facility]

## 2022-11-27 NOTE — PLAN OF CARE
"Goal Outcome Evaluation:         VS: /46 (BP Location: Left arm)   Pulse 76   Temp 97  F (36.1  C) (Oral)   Resp 16   Ht 1.778 m (5' 10\")   Wt 147.1 kg (324 lb 4.8 oz)   SpO2 97%   BMI 46.53 kg/m    Denies SOB, CP, new N/T   O2: 3 LPM via NC. Using IS when encouraged   Output: Voiding into primofit external catheter, encouraging intake   Last BM: 11/22, took bowel meds, denies discomfort. BS+. Fl+. Declined MOM. Took lactulose.    Activity: Refusing repositioning. Continue to encourage. Refusing to move to remove transfer sling from behind him in new bed   Skin: Blisters to inner L thigh, MD aware. Javy BLE. L hip incision. Scabs to BLE shins   Pain: C/o 7/10 pain with any movement. Gave pt 5 mg oxy, tylenol, robaxin, atarax, dilaudid IV prior to transfer   CMS: AxO, tired but alert. Continued N/T to R hand and BLE.   Dressing: CDI   Diet: Reg, encouraged intake  Refused lunch, ate a late breakfast. Family brought in cheese cake, pt eating before staff got BG. Pt refused supper BG check and meal. \"you guys are way more on top of it here than I do at home anyways\"   LDA: GUERRERO drain. R PIV SL   Equipment: PCDs. Urinal. Personal belongings  Bariatric pulsate mattress   Plan: Encourage movement, monitoring hgb, manage pain.   Additional Info: Hgb 7.5 today  Moved pt to lift room with assist x3. Premedicated pt with hydroxyzine and dilaudid. Tolerated ok  Flat, depressed affect. Offered  consult, pt declined. Family visited today                       "

## 2022-11-27 NOTE — PROGRESS NOTES
Time: 0010    Notified patient SpO2 dipping into high 80s periodically while trying to sleep. Patient on CPAP with 4 L O2 bleed in. Hypoxemia resolves on its own. Patient without diagnosis of COPD. He is refusing to use IS, reposition, or take breaths per nursing. Patient complaining that the beeping from his pulse oximeter alarm is waking him. Will lower alarm limit to 88% as long as bouts of hypoxemia remain brief and resolve on their own.     Jem Malloy, DO

## 2022-11-27 NOTE — PROGRESS NOTES
Pt desating to upper 80's on 02 at 3L per oxymizer mask. Pt refuses to take deep breaths or use IS. Refuses to be propped up to sitting position. Pt put on CPAP with O2 3L bled through but still desating. O2 increased to 4L through CPAP O2 sats 92% but not sustaining. Dr Gama Parish paged at [phone] Dr Malloy called back and said to lower lower limit parameter to 88% if pt is desating and coming up right away.

## 2022-11-27 NOTE — PROGRESS NOTES
VS: Vitals stable   O2: Sating 92 % on O2 3LNC then 4L via CPAP. Lung sounds clear. Denies chest pain and SOB.   Output: Voids spontaneously and adequately primofit in place   Last BM: 11/27. Bowel sounds active x4. Passing flatus.   Activity: No oob this shift   Skin: Javy daniella LE, blisters Lt inner thigh, Lt hip surgical incision   Pain: Pain was managed with Robaxin and atarax   Neuro: A&O x4. Denies N/T.   Dressing: Dressing to Lt hip C/D/I.   Diet: Regular.  at 2am. Denies N/V.   LDA: PIV to Rt hand is SL.   Equipment: IV pole, gait belt and personal belongings.   Plan: Continue to monitor. Call light within reach and utilized appropriately   Additional Info:       02:20 Pt assisted to bedpan and pt had a soft BM. Pt refused to be wiped.    06:30 Pt encouraged to reconsider moving to room with a lift ready for him. Pt adamant that he can not stand the pain involved in moving.

## 2022-11-27 NOTE — PROGRESS NOTES
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 Internal Medicine was consulted for post-operative medical management.      Chronically infected left total hip arthroplasty now s/p explantation + extended debridement/reconstruction with antibiotic cement spacer (11/22/22)  -Antibiotics per primary team and ID  - cont Vanc & pip/tazo  -Post-op pain control per primary team  -Drain management per primary team    follow-up cultures    Currently growing pan-sensitive proteus     Stopped Vancomycin    Cont Zosyn (11/23 - present)    Once close to discharge, will recommend switching patient to oral ciprofloxacin 500 mg bid and flagyl 500 mg TID for at least 6 weeks from 11/22     #Normocytic anemia (?anemia of chronic disease) with acute blood loss anemia  - pre-operatively had hgb of 9.9 on 11/14.   -patient has received 4 units of prbc since admission  -no evidence of GI bleed or bleed from wound site  -likely anemia driven by decreased marrow production d/t acute illness  -Trend in AM  -transfuse if <7    Outpatient w/u once acute illness resolves      Acute hypoxic respiratory failure  - resolved  -Patient was 94% on room air today.  - Most largely 2/to hypoventilation with background resistive lung disease given body habitus.  - VBG unremarkable w/o hypercarbia  - Lungs largely clear to auscultation bilaterally   - Does not wear O2 at home    incentive spirometry, monitor respiratory rate with continuous oximetry    oxymask as needed    PTA lasix 20 mg every day     #HALEY -  resolved  Likely pre-renal given volume loss; though patient appears hypervolemic  - cont to monitor     Constipation    Daria-Colace twice daily    MiraLAX daily    Lactulose 3 times daily    Milk of mag as needed    JAIR with obesity- home CPAP- see above     DM2  - PTA med list includes metformin, glipizide, pioglitazone, victoza  -hold home meds presently  -sliding scale insulin TID with meals and at bedtime but in the days just prior to discharge, could consider resuming a few of his home diabetes meds  -A1c 6.8 this admission  -may be able to add some glargine once insulin needs more delineated     CAD  - continue statin, hold aspirin until cleared by surgery team     #History of DVT/PE  - previously on DOAC (eliquis) but this was held perioperatively  -holding Eliquis while monitoring CBC  -If Eliquis to be resumed, the schedule is as below    Likely to resume Eliquis today if hgb is stable    Eliquis 2.5 mg bid x14 days and then eliquis 5 mg bid        #Venous insufficiency with signs of stasis dermatitis  Erythematous patches on bilateral lower extremities - left anterior lower leg and smaller area on right lower leg  Low suspicion for cellulitis and more consistent with chronic stasis dermatitis  - PTA med list includes furosemide resumed today  - compression stockings     #Lack of motivation  - depression screen negative    Trial of low dose ritalin (not home med)     #Respiratory acidosis-- intra-operative blood gases with high CO2 and low pH. Resolved.   #Hypocalcemia- will check ionized calcium in AM- could be due to blood products or could be due to low albumin  -recheck in AM  #Hyperkalemia- mild and intermittent on the intra-op labs. Asymptomatic  -recheck in AM.        Diet: Advance Diet as Tolerated: Regular Diet Adult    DVT Prophylaxis: Pneumatic Compression Devices; holding home eliquis given blood loss anemia  Tran Catheter: Not present  Central Lines: None  Cardiac Monitoring: None  Code  "Status: Full Code      Disposition Plan         The patient's care was discussed with the Patient.    TAB PARRA MD  Hospitalist Service, GOLD TEAM [address] [place] [address]  Securely message with the Vocera Web Console (learn more here)  Text page via [facility] Paging/Directory   Please see signed in provider for up to date coverage information      Clinically Significant Risk Factors              # Hypoalbuminemia: Lowest albumin = 2.4 g/dL (Ref range: 3.5-5.2) at 11/23/2022  6:12 AM, will monitor as appropriate         # DMII: A1C = 6.8 % (Ref range: 0.0 - 5.6 %) within past 3 months   # Severe Obesity: Estimated body mass index is 46.53 kg/m  as calculated from the following:    Height as of this encounter: 1.778 m (5' 10\").    Weight as of this encounter: 147.1 kg (324 lb 4.8 oz).          ______________________________________________________________________    Interval History   Received another x1 unit prbc (x4 prbc total since admission)   Remains on supplemental O2  Patient refusing to use IS, reposition, or take breaths per nursing.     Data reviewed today: I reviewed all medications, new labs and imaging results over the last 24 hours.      Physical Exam   Vital Signs: Temp: 99.3  F (37.4  C) Temp src: Oral BP: 127/48 Pulse: 71   Resp: 16 SpO2: 92 % O2 Device: Nasal cannula Oxygen Delivery: 3 LPM  Weight: 324 lbs 4.75 oz    General Appearance: Obese male, lying in bed, comfortably in bed, on room air, in no acute distress of discomfort  HEENT: PERRL: EOMI; moist mucous membrane w/o lesions  Neck: No JVD  Pulmonary: Clear to auscultation bilaterally, no wheezes or crackles  CVS: Regular rhythm, no murmurs, rubs or gallops  GI: BS (+), soft nontender, no rebound or guarding   Extremities: Left lateral hip in dressing c/d/i; drain in place.  Skin: No rashes or lesions  Neurologic: A&O x3      Data   Recent Labs   Lab 11/27/22  0717 11/27/22 0222 11/26/22 2222 " 11/26/22  1749 11/26/22  1022 11/26/22  0641 11/25/22  1010 11/25/22  0911 11/24/22  0421 11/24/22  0232 11/23/22  0939 11/23/22  0612   WBC  --   --   --   --   --   --   --  13.0*  --   --   --   --    HGB  --   --   --   --   --  6.8*  --  6.3*  --  7.1*   < > 6.8*   MCV  --   --   --   --   --   --   --  95  --   --   --   --    PLT  --   --   --   --   --   --   --  245  --   --   --   --    NA  --   --   --   --   --  140  --  139  --   --   --  137   POTASSIUM  --   --   --   --   --  4.8  --  4.5  --   --   --  5.8*   CHLORIDE  --   --   --   --   --  107  --  108  --   --   --  106   CO2  --   --   --   --   --  29  --  25  --   --   --  28   BUN  --   --   --   --   --  17  --  17  --   --   --  30   CR 0.97  --   --   --   --  1.08  1.08  --  1.09  --  1.27*   < > 1.29*   ANIONGAP  --   --   --   --   --  4  --  6  --   --   --  3   MAIKOL  --   --   --   --   --  8.3*  --  8.0*  --   --   --  7.6*   GLC  --  250* 260* 249*   < > 201*   < > 217*   < >  --    < > 258*   ALBUMIN  --   --   --   --   --   --   --   --   --   --   --  2.4*   PROTTOTAL  --   --   --   --   --   --   --   --   --   --   --  5.6*   BILITOTAL  --   --   --   --   --   --   --   --   --   --   --  0.2   ALKPHOS  --   --   --   --   --   --   --   --   --   --   --  56   ALT  --   --   --   --   --   --   --   --   --   --   --  19   AST  --   --   --   --   --   --   --   --   --   --   --  48*    < > = values in this interval not displayed.     Recent Results (from the past 24 hour(s))   XR Chest Port 1 View    Narrative    Exam: XR CHEST PORT 1 VIEW, 11/26/2022 10:12 AM    Indication: Acute hypoxic respiratory failure    Comparison: None    Findings:   Heart within normal limits. Pulmonary vasculature enlarged and mildly  indistinct. Mild perihilar hazy opacity. No focal consolidative  opacities identified.      Impression    Impression: Findings most suggestive of pulmonary edema.    LAUREEN STEELE MD         SYSTEM ID:  [id number]      Medications       aspirin  162 mg Oral Daily     atorvastatin  40 mg Oral Daily     insulin aspart  1-7 Units Subcutaneous TID AC     insulin aspart  1-5 Units Subcutaneous At Bedtime     methylphenidate  5 mg Oral QAM AC     piperacillin-tazobactam  3.375 g Intravenous Q6H     polyethylene glycol  17 g Oral Daily     senna-docusate  2 tablet Oral BID     sodium chloride (PF)  3 mL Intracatheter Q8H      0 = independent

## 2022-11-27 NOTE — PLAN OF CARE
Pt is A&Ox4. VSS. LS clear, on RA. BS active, LBM 11/22/2022. Condom billy patent and draining well. Pain managed with 5mg oxycodone. L hip surgical dressing is c/d/I. Pt refused to get oob. L PIV is patent and SL. Continue to monitor.

## 2022-11-28 NOTE — PROGRESS NOTES
Pt requested to use BSC. Assisted pt to edge of the bed with assist of 4 . Pt refused to continue to BSC. Lift used to lift pt and pt had an incontinent episode of large soft BM. Pt cleaned and returned to bed.  Pt took lactulose but refused senna.

## 2022-11-28 NOTE — PROGRESS NOTES
VS: Vitals stable   O2: Sating 95 % on O2 3LNC then 3L via CPAP. Lung sounds clear. Denies chest pain and SOB.   Output: Voids spontaneously and adequately primofit in place   Last BM: Large BM 11/27. Bowel sounds active x4. Passing flatus.   Activity: No oob this shift   Skin: Javy daniella LE, popped blisters Lt inner thigh drying, Lt hip surgical incision   Pain: Pain was managed with oxycodone   Neuro: A&O x4. Denies N/T.   Dressing: Dressing to Lt hip C/D/I.   Diet: Regular.  at 2am. Denies N/V.   LDA: PIV to Rt hand is SL.   Equipment: IV pole, gait belt and personal belongings.   Plan: Continue to monitor. Call light within reach and utilized appropriately   Additional Info:         Pt slept mantaining sats above 95% on CPAP with O2 3LPM bled through. Pt refusing repositioning. Pt able to make needs known.    Called to pt's room and pt reported that he had told the nurse last night  that he was sitting in stool and needed to be changed. Reminded pt that he had been cleaned after he was lifted off the bed using the ceiling lift. Pt had call light within reach and had not called to be cleaned. Writer doing frequent rounds making sure pt's O2 sats were staying above 94% and that needs were met. Called two other nurses to help and explained to pt the 2 options either help him roll on his side with pillows between his legs or lift him up with lift and clean him and change linen. Pt refused to have the mat for the ceiling lift placed under him stating it's too painful. Pt had been medicated with oxycodone. Advised pt to call when ready to be cleaned after explaining that it is bad for his skin.

## 2022-11-28 NOTE — PROGRESS NOTES
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 Internal Medicine was consulted for post-operative medical management.      #Chronically infected left total hip arthroplasty now s/p explantation + extended debridement/reconstruction with antibiotic cement spacer (11/22/22)  -Antibiotics per primary team and ID  - cont Vanc & pip/tazo  -Post-op pain control per primary team  -Drain management per primary team    follow-up cultures    Currently growing pan-sensitive proteus     Stopped Vancomycin    Cont Zosyn (11/23 - present)    On discharge, will recommend switching patient to oral ciprofloxacin 500 mg bid and flagyl 500 mg TID for at least 6 weeks from 11/22/2022 - 01/03/2023     #Normocytic anemia (?anemia of chronic disease) with acute blood loss anemia  - pre-operatively had hgb of 9.9 on 11/14.   -patient has received 4 units of prbc since admission  -Hemoglobin has since stabilized at 7.5  -transfuse if <7    Outpatient w/u once acute illness resolves    Continue to monitor while resuming eliquis today      Acute hypoxic respiratory failure  - due to   -Patient was 94% on room air today.  - Most largely 2/to hypoventilation with background resistive lung disease given body habitus.  - VBG unremarkable w/o hypercarbia  - Lungs largely clear to auscultation bilaterally   - Does not wear O2 at home    incentive spirometry, monitor respiratory rate with continuous oximetry    oxymask as needed    PTA lasix 20 mg every day     #HALEY - resolved  Likely pre-renal given volume  loss; though patient appears hypervolemic  - cont to monitor     Constipation    Daria-Colace twice daily    MiraLAX daily    Lactulose 3 times daily    Milk of mag as needed    JAIR with obesity- home CPAP- see above     DM2  - PTA med list includes metformin, glipizide, pioglitazone, victoza  -hold home meds presently  -sliding scale insulin TID with meals and at bedtime but in the days just prior to discharge, could consider resuming a few of his home diabetes meds  -A1c 6.8 this admission  -may be able to add some glargine once insulin needs more delineated     CAD  - continue statin, hold aspirin until cleared by surgery team     #History of DVT/PE  - previously on DOAC (eliquis) but this was held perioperatively  -If Eliquis to be resumed, the schedule is as below    Eliquis 2.5 mg bid x14 days and then eliquis 5 mg bid (starting 11/28)    Discontinue ASA       #Venous insufficiency with signs of stasis dermatitis  Erythematous patches on bilateral lower extremities - left anterior lower leg and smaller area on right lower leg  Low suspicion for cellulitis and more consistent with chronic stasis dermatitis  - PTA med list includes furosemide resumed today  - compression stockings     #Lack of motivation  - depression screen negative    Trial of low dose ritalin (not home med)     #Respiratory acidosis-- intra-operative blood gases with high CO2 and low pH. Resolved.   #Hypocalcemia- will check ionized calcium in AM- could be due to blood products or could be due to low albumin  -recheck in AM  #Hyperkalemia- mild and intermittent on the intra-op labs. Asymptomatic  -recheck in AM.        Diet: Advance Diet as Tolerated: Regular Diet Adult    DVT Prophylaxis: Resuming Eliquis today.  Discontinue aspirin to decrease bleeding risk  Tran Catheter: Not present  Central Lines: None  Cardiac Monitoring: None  Code Status: Full Code      Disposition Plan     Expected Discharge Date: 11/29/2022        Discharge  "Comments: Needs to participate in therapy.  Needs o2 at this time      The patient's care was discussed with the Patient.    TAB PARRA MD  Hospitalist Service, GOLD TEAM [address] [place] [address]  Securely message with the Vocera Web Console (learn more here)  Text page via [facility] Paging/Directory   Please see signed in provider for up to date coverage information      Clinically Significant Risk Factors              # Hypoalbuminemia: Lowest albumin = 2.4 g/dL (Ref range: 3.5-5.2) at 11/23/2022  6:12 AM, will monitor as appropriate         # DMII: A1C = 6.8 % (Ref range: 0.0 - 5.6 %) within past 3 months   # Severe Obesity: Estimated body mass index is 46.53 kg/m  as calculated from the following:    Height as of this encounter: 1.778 m (5' 10\").    Weight as of this encounter: 147.1 kg (324 lb 4.8 oz).          ______________________________________________________________________    Interval History   Hemoglobin has since remained stable  Per documentation, appears patient had a BM yesterday.  Vitals reviewed, listed as patient being on 3 L nasal cannula.  Patient continues to remain participatory in care  Reports sitting in stool, however, has been resistance to turns with nursing.    Data reviewed today: I reviewed all medications, new labs and imaging results over the last 24 hours.      Physical Exam   Vital Signs: Temp: (!) 96.6  F (35.9  C) Temp src: Oral BP: 131/47 Pulse: 73   Resp: 16 SpO2: 97 % O2 Device: Oxymask Oxygen Delivery: 3 LPM  Weight: 324 lbs 4.75 oz    General Appearance: Obese male, lying in bed, comfortably in bed, on room air, in no acute distress of discomfort  HEENT: PERRL: EOMI; moist mucous membrane w/o lesions  Neck: No JVD  Pulmonary: Clear to auscultation bilaterally, no wheezes or crackles  CVS: Regular rhythm, no murmurs, rubs or gallops  GI: BS (+), soft nontender, no rebound or guarding   Extremities: Left lateral hip in dressing c/d/i; " drain in place.  Skin: No rashes or lesions  Neurologic: A&O x3      Data   Recent Labs   Lab 11/28/22  0553 11/28/22  0221 11/27/22  2206 11/27/22  1122 11/27/22  0824 11/27/22  0717 11/26/22  1022 11/26/22  0641 11/25/22  1010 11/25/22  0911 11/23/22  0939 11/23/22  0612   WBC 9.9  --   --   --   --   --   --   --   --  13.0*  --   --    HGB 7.5*  --   --   --  7.5*  --   --  6.8*  --  6.3*   < > 6.8*   MCV 95  --   --   --   --   --   --   --   --  95  --   --      --   --   --   --   --   --   --   --  245  --   --      --   --   --   --   --   --  140  --  139  --  137   POTASSIUM 4.3  --   --   --   --   --   --  4.8  --  4.5  --  5.8*   CHLORIDE 107  --   --   --   --   --   --  107  --  108  --  106   CO2 31  --   --   --   --   --   --  29  --  25  --  28   BUN 21  --   --   --   --   --   --  17  --  17  --  30   CR 1.06  --   --   --   --  0.97  --  1.08  1.08  --  1.09   < > 1.29*   ANIONGAP 2*  --   --   --   --   --   --  4  --  6  --  3   MAIKOL 8.2*  --   --   --   --   --   --  8.3*  --  8.0*  --  7.6*   * 213* 199*   < >  --   --    < > 201*   < > 217*   < > 258*   ALBUMIN  --   --   --   --   --   --   --   --   --   --   --  2.4*   PROTTOTAL  --   --   --   --   --   --   --   --   --   --   --  5.6*   BILITOTAL  --   --   --   --   --   --   --   --   --   --   --  0.2   ALKPHOS  --   --   --   --   --   --   --   --   --   --   --  56   ALT  --   --   --   --   --   --   --   --   --   --   --  19   AST  --   --   --   --   --   --   --   --   --   --   --  48*    < > = values in this interval not displayed.     No results found for this or any previous visit (from the past 24 hour(s)).  Medications       aspirin  162 mg Oral Daily     atorvastatin  40 mg Oral Daily     furosemide  20 mg Oral Daily     insulin aspart  1-7 Units Subcutaneous TID AC     insulin aspart  1-5 Units Subcutaneous At Bedtime     lactulose  20 g Oral BID     methylphenidate  5 mg Oral QAM AC      piperacillin-tazobactam  3.375 g Intravenous Q6H     polyethylene glycol  17 g Oral Daily     senna-docusate  2 tablet Oral BID     sodium chloride (PF)  3 mL Intracatheter Q8H

## 2022-11-28 NOTE — PROGRESS NOTES
"Orthopedic Surgery Progress Note: 11/28/2022    Subjective:   Patient amenable to some nursing cares overnight.  External male urinary catheter put in place.  Occasionally excepts turns, but she changes.  Significant pain with turns.    Required blood transfusions postoperatively.  Hemoglobin stabilized at 7.5 yesterday, today.    Discussed resumption of anticoagulation today.    Tolerating a diet. No new concerns or complaints. Denies SOB, chest Pain, fevers, chills. HR is 75 during examination.       Objective:   /47 (BP Location: Left arm, Patient Position: Semi-Fuentes's)   Pulse 73   Temp (!) 96.6  F (35.9  C) (Oral)   Resp 16   Ht 1.778 m (5' 10\")   Wt 147.1 kg (324 lb 4.8 oz)   SpO2 97%   BMI 46.53 kg/m    No intake/output data recorded.  General: NAD. Resting comfortably in bed.  Respiratory: Breathing comfortably on RA.  Musculoskeletal:    Focused Exam of Left Lower Extremity  Dressing C/D/I.   Fires EHL, FHL, TA, GSC, Quad  SILT DP, SP, Saph, Sural, Tibial Nerve Distributions  1+ DP pulse, foot WWP. 1+ edema leg.     Output by Drain (mL) 11/26/22 0700 - 11/26/22 1459 11/26/22 1500 - 11/26/22 2259 11/26/22 2300 - 11/27/22 0659 11/27/22 0700 - 11/27/22 1459 11/27/22 1500 - 11/27/22 2259 11/27/22 2300 - 11/28/22 0659 11/28/22 0700 - 11/28/22 0708   Closed/Suction Drain 1 Left Hip Bulb 15 Thai  100 100  100 65          Laboratory Data:  Lab Results   Component Value Date    WBC 9.9 11/28/2022    HGB 7.5 (L) 11/28/2022     11/28/2022         Assessment & Plan:   Waldo Bustos is a 72 year old male s/p Explantation of Chronic  Infected Left Total Hip Arthroplasty, Extended Trochanteric Osteotomy with Extensive Debridement and Reconstruction Using Antibiotic Cement Spacer on 11/22/2022 with Dr. Meyers.     11/28: Acute blood loss anemia, stabilized  - Drain to remain in place  - Okay for resumption of Eliquis, discontinue aspirin.  - Daily hemoglobin, until Eliquis at steady state.  " Hemoglobin stabilized  - ID final recommendations in (see below), continue Zosyn while in hospital    Cultures: Proteus mirabilis    ----Infectious disease recommendations below----  -stop vancomycin given Proteus and anaerobes are covered by zosyn  -can discharge patient on oral ciprofloxacin 500 mg bid and flagyl 500 mg TID for at least 6 weeks from 11/22  -follow up with ID in 5 weeks, ideally ~1 week prior to completion of antibiotics      Activity: Up with assist and assistive devices as needed until independent.  Weight bearing status: NWB LLE  Antibiotics: Per ID, Zosyn in-house, for discharge oral ciprofloxacin 500 mg bid and flagyl 500 mg TID   Diet: Begin with clear fluids and progress diet as tolerated. Bowel regimen. Anti-emetics PRN.    DVT prophylaxis:  mg (current), okay to resume Eliquis 2.5 mg BID x2 weeks, then 5 mg BID, initially held for anemia  Elevation: Elevate heels off of bed on pillows   Wound Care: Alginate, tegaderm to be changed PRN by ortho  Drains: 15f GUERRERO drain  Tran: Removed POD1  Pain management: Orals PRN, IV for breakthrough only  X-rays: AP Pelvis and lateral in PACU complete  Physical Therapy: Mobilization, ROM, ADL's  Occupational Therapy: ADL's  Labs: Trend Hgb on POD #1, 2  Consults: PT, OT. ID. Hospitalist, appreciate assistance in caring for this patient throughout the perioperative period-     Disposition: Pending progress with therapies, pain control on orals, and medical stability; anticipate discharge to likely TCU after Hgb stabilized. ID plan in place.     Orthopedic surgery staff for this patient is Dr. Meyers.    ------------------------------------------------------------------------------------------    Damian Frank MD  Orthopedic Surgery PGY4  [phone]      FOLLOWUP:    Future Appointments   Date Time Provider Department [place]   11/23/2022  8:15 AM Aisha Prakash Pt, PT [facility]   11/23/2022 11:15 AM Wendy Betancur, OT [facility] [place]    12/12/2022 11:45 AM Curt Mariano, D[facility]

## 2022-11-28 NOTE — PROGRESS NOTES
SPIRITUAL HEALTH SERVICES  SPIRITUAL ASSESSMENT Progress Note  [facility] ([facility] 5 A ORTHO R 0515 11/28/22      REFERRAL SOURCE: Self Referral    Pt declined visit with Unit . He was very pleasant during introduction. Pt mentioned he was doing well so far.    PLAN: No follow up necessary.    Abraham Thornton MA, MPA  Associate   Pager: [phone]

## 2022-11-28 NOTE — PROGRESS NOTES
"  VS: BP (!) 144/58 (BP Location: Left arm)   Pulse 76   Temp (!) 96.4  F (35.8  C)   Resp 16   Ht 1.778 m (5' 10\")   Wt 147.1 kg (324 lb 4.8 oz)   SpO2 96%   BMI 46.53 kg/m     O2: Pt needing 02 via mask when laying flat in bed.Encouraged patient with use of IS. Encouraged patient to deep breathe and cough deeply.    Output: Pt has been voiding a lot when up on bedpan. Also utilizing the external catheter at night time.    Last BM: Had multiple loose stools the entire morning.    Activity: Pt extremely reluctant to move side to side or get up onto commode. Pt needs a lot of reassurance to increase activity. Pt seems to not trust staff with moving his left leg. Reassured patient often. Pt was able to tolerate getting up into sling. Pt states\" This is the best I have felt.\" Pt preferred to go onto the bedpan while being up in sling.    Skin: Scrapes on left lower leg. Applied lotion and encouraged patient to complete ankle pumps while in bed.    Pain: Pt rating pain a 10 on scale with any type of movement.    CMS: Numbness in both lower feet.    Dressing: Left hip dressing is CDI   Diet: Regular. Pt did not want to order breakfast today.    LDA: IV SL works well for antibiotics.   Equipment: Ceiling lift, IS, bariatric bed, bariatric commode, external catheters , Face mask with 02 at 3 liters, CPAP in room, Multiple staff to help with movement of patient   Plan: Continue to monitor.    Additional Info: Pt plays the guitar and likes to talk about music.        "

## 2022-11-29 NOTE — PLAN OF CARE
"  VS: /44 (BP Location: Left arm)   Pulse 72   Temp 97.3  F (36.3  C) (Oral)   Resp 16   Ht 1.778 m (5' 10\")   Wt 147.1 kg (324 lb 4.8 oz)   SpO2 95%   BMI 46.53 kg/m       O2: Stable on RA.   Output: External cathter    Last BM: 11/28   Activity: Refused to get out of bed.   Skin: Pt refused to role in bed for full skin assessment,    Pain: Takes oxycodone, and robaxin.   Neuro: Alter and oriented.   Dressing: CDI.   Diet: Regular diet.   LDA: PIV SL between abx.   Equipment: Bedside commode at bedside, call light with in reach.   Plan: Hourly round.    Additional Info:    Goal Outcome Evaluation: pt denies the need to used commode or bedpan during this shift, has external catheter, with adequate output.                         "

## 2022-11-29 NOTE — PROGRESS NOTES
A/Ox's 4. Pt rated pain as tolerable at rest and intolerable with movement. Oxycodone given for pain control. Pt only allowed micro movements in bed. Unable to assess back side as patient refused to roll. Education done regarding pressure injury prevention.  Dressing CDI. Pt has a drain.  CMS to baseline. Denied any nausea, CP, SOB, lightheadedness or dizziness. External catheter utalized. Passing flatus and no BM overnight. Pt did not get OOB, Heavy assist to do so. Resting in bed at this time with call light in reach. Able to make needs known. Continue to monitor.

## 2022-11-29 NOTE — PROGRESS NOTES
"Pt called outpatient ortho clinic, and claimed nobody has helped him to the toilet. CN received a call from outpatient ortho clinic, about the inquiry, at 3pm.  Patient did not call nurse or aid for any help, at 1:35pm COOPER moser and Roni, went into patient room to assist if he needed to be repositioned or changed. The patient refused help. At  2:26pm Leroy went into patient room to check BG and also asked if he need any help, which at this point patient state he does not need to use a bedpan. At 3:15pm someone whom doesn't not want to state his name, called on the unit phone and state that his father need to use a rest room and hanged up the phone on writer ear. At 3:25, Roni GAMBOA, COOPER Gale and myself went into patient room to assist patient to bedpan, patient state \" I don't need help to at this time\" writer asked the pt , someone called the unit and claimed a son, told the unit that you need help and have not been attended to since 12pm. Pt state \" I do not have a son. My son  20 years ago\". Pt has a external catheter,  Clean and dry. Axo x4. HAILEE Holden notified.  "

## 2022-11-29 NOTE — PROGRESS NOTES
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 Internal Medicine was consulted for post-operative medical management.      #Chronically infected left total hip arthroplasty now s/p explantation + extended debridement/reconstruction with antibiotic cement spacer (11/22/22)  -Antibiotics per primary team and ID  - cont Vanc & pip/tazo  -Post-op pain control per primary team  -Drain management per primary team    follow-up cultures    Currently growing pan-sensitive proteus     Stopped Vancomycin    Cont Zosyn (11/23 - present)    On discharge, will recommend switching patient to oral ciprofloxacin 500 mg bid and flagyl 500 mg TID for at least 6 weeks from 11/22/2022 - 01/03/2023     #Normocytic anemia (?anemia of chronic disease) with acute blood loss anemia  - pre-operatively had hgb of 9.9 on 11/14.   -patient has received 4 units of prbc since admission  -Hemoglobin has since stabilized at 7.5  -transfuse if <7    Outpatient w/u once acute illness resolves    Continue to monitor while resuming eliquis today      Acute hypoxic respiratory failure   - Most largely 2/to hypoventilation with background resistive lung disease given body habitus.  - VBG unremarkable w/o hypercarbia  - Lungs largely clear to auscultation bilaterally   - Does not wear O2 at home    incentive spirometry, monitor respiratory rate with continuous oximetry    oxymask as needed    PTA lasix 20 mg every day     #HALEY - resolved  Likely pre-renal given volume loss; though patient appears hypervolemic  -  cont to monitor     Constipation    Daria-Colace twice daily    MiraLAX daily    Lactulose 3 times daily    Milk of mag as needed    JAIR with obesity- home CPAP- see above     DM2  - PTA med list includes metformin, glipizide, pioglitazone, victoza  -hold home meds presently  -sliding scale insulin TID with meals and at bedtime but in the days just prior to discharge( requires minimal amounts), could consider resuming a few of his home diabetes meds  -A1c 6.8 this admission  -may be able to add some glargine once insulin needs more delineated     CAD  - continue statin, hold aspirin until cleared by surgery team     #History of DVT/PE  - previously on DOAC (eliquis) but this was held perioperatively  -If Eliquis to be resumed, the schedule is as below    Eliquis 2.5 mg bid x14 days and then eliquis 5 mg bid (starting 11/28)    Discontinue ASA       #Venous insufficiency with signs of stasis dermatitis  Erythematous patches on bilateral lower extremities - left anterior lower leg and smaller area on right lower leg  Low suspicion for cellulitis and more consistent with chronic stasis dermatitis  - PTA med list includes furosemide resumed today  - compression stockings     #Lack of motivation  - depression screen negative    Trial of low dose ritalin (not home med)     #Respiratory acidosis-- intra-operative blood gases with high CO2 and low pH. Resolved.   #Hypocalcemia- will check ionized calcium in AM- could be due to blood products or could be due to low albumin  -recheck in AM  #Hyperkalemia- mild and intermittent on the intra-op labs. Asymptomatic  -recheck in AM.        Diet: Advance Diet as Tolerated: Regular Diet Adult    DVT Prophylaxis: Resuming Eliquis today.  Discontinue aspirin to decrease bleeding risk  Tran Catheter: Not present  Central Lines: None  Cardiac Monitoring: None  Code Status: Full Code      Disposition Plan     Expected Discharge Date: 11/29/2022        Discharge Comments: Needs to  "participate in therapy.  Needs o2 at this time      The patient's care was discussed with the Patient.    Gi Bean MD  Hospitalist Service, GOLD TEAM [address] [place] [address]  Securely message with the Vocera Web Console (learn more here)  Text page via [facility] Paging/Directory   Please see signed in provider for up to date coverage information      Clinically Significant Risk Factors              # Hypoalbuminemia: Lowest albumin = 2.4 g/dL (Ref range: 3.5-5.2) at 11/23/2022  6:12 AM, will monitor as appropriate         # DMII: A1C = 6.8 % (Ref range: 0.0 - 5.6 %) within past 3 months   # Severe Obesity: Estimated body mass index is 46.53 kg/m  as calculated from the following:    Height as of this encounter: 1.778 m (5' 10\").    Weight as of this encounter: 147.1 kg (324 lb 4.8 oz).          ______________________________________________________________________    Interval History   No new complaints overnight. Discussed about the need to participate in therapy   Vitals reviewed, listed as patient being on 2 L nasal cannula.  Denies chest pain or SOB    Data reviewed today: I reviewed all medications, new labs and imaging results over the last 24 hours.      Physical Exam   Vital Signs: Temp: 97.3  F (36.3  C) Temp src: Oral BP: 127/44 Pulse: 72   Resp: 16 SpO2: 95 % O2 Device: Nasal cannula Oxygen Delivery: 2 LPM  Weight: 324 lbs 4.75 oz    General Appearance: Obese male, lying in bed, comfortably in bed, on room air, in no acute distress of discomfort  HEENT: PERRL: EOMI; moist mucous membrane w/o lesions  Neck: No JVD  Pulmonary: Clear to auscultation bilaterally, no wheezes or crackles  CVS: Regular rhythm, no murmurs, rubs or gallops  GI: BS (+), soft nontender, no rebound or guarding   Extremities: Left lateral hip in dressing c/d/i; drain in place.  Skin: No rashes or lesions  Neurologic: A&O x3      Data   Recent Labs   Lab 11/29/22  0926 11/29/22  0547 " 11/29/22  0212 11/28/22  2153 11/28/22  0951 11/28/22  0553 11/27/22  1122 11/27/22  0824 11/27/22  0717 11/26/22  1022 11/26/22  0641 11/25/22  1010 11/25/22  0911 11/23/22  0939 11/23/22  0612   WBC  --  10.3  --   --   --  9.9  --   --   --   --   --   --  13.0*  --   --    HGB  --  7.8*  --   --   --  7.5*  --  7.5*  --   --  6.8*  --  6.3*   < > 6.8*   MCV  --  97  --   --   --  95  --   --   --   --   --   --  95  --   --    PLT  --  331  --   --   --  278  --   --   --   --   --   --  245  --   --    NA  --   --   --   --   --  140  --   --   --   --  140  --  139  --  137   POTASSIUM  --   --   --   --   --  4.3  --   --   --   --  4.8  --  4.5  --  5.8*   CHLORIDE  --   --   --   --   --  107  --   --   --   --  107  --  108  --  106   CO2  --   --   --   --   --  31  --   --   --   --  29  --  25  --  28   BUN  --   --   --   --   --  21  --   --   --   --  17  --  17  --  30   CR  --  1.01  --   --   --  1.06  --   --  0.97  --  1.08  1.08  --  1.09   < > 1.29*   ANIONGAP  --   --   --   --   --  2*  --   --   --   --  4  --  6  --  3   MAIKOL  --   --   --   --   --  8.2*  --   --   --   --  8.3*  --  8.0*  --  7.6*   *  --  198* 232*   < > 215*   < >  --   --    < > 201*   < > 217*   < > 258*   ALBUMIN  --   --   --   --   --   --   --   --   --   --   --   --   --   --  2.4*   PROTTOTAL  --   --   --   --   --   --   --   --   --   --   --   --   --   --  5.6*   BILITOTAL  --   --   --   --   --   --   --   --   --   --   --   --   --   --  0.2   ALKPHOS  --   --   --   --   --   --   --   --   --   --   --   --   --   --  56   ALT  --   --   --   --   --   --   --   --   --   --   --   --   --   --  19   AST  --   --   --   --   --   --   --   --   --   --   --   --   --   --  48*    < > = values in this interval not displayed.     No results found for this or any previous visit (from the past 24 hour(s)).  Medications       apixaban ANTICOAGULANT  2.5 mg Oral BID    Followed by     [START ON  12/12/2022] apixaban ANTICOAGULANT  5 mg Oral BID     atorvastatin  40 mg Oral Daily     furosemide  20 mg Oral Daily     insulin aspart  1-7 Units Subcutaneous TID AC     insulin aspart  1-5 Units Subcutaneous At Bedtime     lactulose  20 g Oral BID     methylphenidate  5 mg Oral QAM AC     piperacillin-tazobactam  3.375 g Intravenous Q6H     polyethylene glycol  17 g Oral Daily     senna-docusate  2 tablet Oral BID     sodium chloride (PF)  3 mL Intracatheter Q8H

## 2022-11-29 NOTE — PROGRESS NOTES
"Orthopedic Surgery Progress Note: 11/29/2022    Subjective:   No acute events overnight. Tolerating a diet. No new concerns or complaints. Denies SOB, chest Pain, fevers, chills. HR is 75 during examination.     Eliquis started yesterday, discussed Hgb trend with anticoagulation resumption.     Objective:   /52 (BP Location: Left arm)   Pulse 64   Temp (!) 96.5  F (35.8  C) (Oral)   Resp 16   Ht 1.778 m (5' 10\")   Wt 147.1 kg (324 lb 4.8 oz)   SpO2 94%   BMI 46.53 kg/m    No intake/output data recorded.  General: NAD. Resting comfortably in bed.  Respiratory: Breathing comfortably on RA.  Musculoskeletal:    Focused Exam of Left Lower Extremity  Dressing C/D/I.   Fires EHL, FHL, TA, GSC, Quad  SILT DP, SP, Saph, Sural, Tibial Nerve Distributions  1+ DP pulse, foot WWP. 1+ edema leg.     Output by Drain (mL) 11/27/22 0700 - 11/27/22 1459 11/27/22 1500 - 11/27/22 2259 11/27/22 2300 - 11/28/22 0659 11/28/22 0700 - 11/28/22 1459 11/28/22 1500 - 11/28/22 2259 11/28/22 2300 - 11/29/22 0546   Closed/Suction Drain 1 Left Hip Bulb 15 [place]  100 65 50           Laboratory Data:  Lab Results   Component Value Date    WBC 9.9 11/28/2022    HGB 7.5 (L) 11/28/2022     11/28/2022         Assessment & Plan:   Waldo Bustos is a 72 year old male s/p Explantation of Chronic  Infected Left Total Hip Arthroplasty, Extended Trochanteric Osteotomy with Extensive Debridement and Reconstruction Using Antibiotic Cement Spacer on 11/22/2022 with Dr. Meyers.     11/29: Acute blood loss anemia, stabilized. Eliquis resumed on 11/29, will follow Hgb with new anticoagulation.   - Drain to remain in place  - Daily hemoglobin, until Eliquis at steady state.  Hemoglobin stabilized  - ID final recommendations in (see below), continue Zosyn while in hospital    Cultures: Proteus mirabilis    ----Infectious disease recommendations below----  -stop vancomycin given Proteus and anaerobes are covered by zosyn  -can discharge " patient on oral ciprofloxacin 500 mg bid and flagyl 500 mg TID for at least 6 weeks from 11/22  -follow up with ID in 5 weeks, ideally ~1 week prior to completion of antibiotics      Activity: Up with assist and assistive devices as needed until independent.  Weight bearing status: NWB LLE  Antibiotics: Per ID, Zosyn in-house, for discharge oral ciprofloxacin 500 mg bid and flagyl 500 mg TID   Diet: Begin with clear fluids and progress diet as tolerated. Bowel regimen. Anti-emetics PRN.    DVT prophylaxis: Initially on  mg (postop), Eliquis 2.5 mg BID x2 weeks after surgery (started on 11/28), then 5 mg BID PTA resumption, initially held for anemia  Elevation: Elevate heels off of bed on pillows   Wound Care: Alginate, tegaderm to be changed PRN by ortho  Drains: 15f GUERRERO drain  Tran: Removed POD1  Pain management: Orals PRN, IV for breakthrough only  X-rays: AP Pelvis and lateral in PACU complete  Physical Therapy: Mobilization, ROM, ADL's  Occupational Therapy: ADL's  Labs: Trend Hgb on POD #1, 2  Consults: PT, OT. ID. Hospitalist, appreciate assistance in caring for this patient throughout the perioperative period-     Disposition: Pending progress with therapies, pain control on orals, and medical stability; anticipate discharge to likely TCU after Hgb stabilized. ID plan in place.     Orthopedic surgery staff for this patient is Dr. Meyers.    ------------------------------------------------------------------------------------------  Elvia Mcmillan MD  Orthopaedic Surgery, PGY1

## 2022-11-30 NOTE — PLAN OF CARE
Pt A/O X 4. Afebrile. VSS. Lungs-Clear bilaterally with both anterior and posterior. Pt is on Continuous Pulse-Oximetry, and is on CPAP with 3 LPM oxygen while asleep. IS encouraged. Bowels-Active in all four quadrants, last BM 11-28-22. External catheter (Primofit) is patent with 700 mL's clear yellow output. Pt denies nausea and vomiting. CMS and Neuro's are intact. Numbness to the BLE's. Pt has pain in the left leg and given Oxycodone, Tylenol, and pain is manageable. Is on a Regular diet and appetite was Good this shift. Blood glucose levels 173,230, and sliding scale insulin given. Left hip incisional dressing is C/D/I. GUERRERO Drain is patent to the left hip area with 50 mL's serosanguinous drainage. Pt has jonathan bilateral lower extremities with a lesion on left lower thigh area that is C/D/I. Pt did not get out of bed this shift and refused to reposition in bed. Pt is on Eloquis and does not need PCD's per telephone order by . Pt refused a complete posterior skin assessment. PIV patent in the right hand and saline locked after receiving IV abx. Bilateral heels are elevated off the bed. Pt is able to make needs known, and call light is within reach. Continue to monitor.

## 2022-11-30 NOTE — PROGRESS NOTES
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 Internal Medicine was consulted for post-operative medical management.      #Chronically infected left total hip arthroplasty now s/p explantation + extended debridement/reconstruction with antibiotic cement spacer (11/22/22)  -Antibiotics per primary team and ID  - cont Vanc & pip/tazo  -Post-op pain control per primary team  -Drain management per primary team    follow-up cultures    Currently growing pan-sensitive proteus, now with additional Finegoldia Magna ( Anaerobic gram positive)    Stopped Vancomycin    Cont Zosyn (11/23 - present)    On discharge, will recommend switching patient to oral ciprofloxacin 500 mg bid and flagyl 500 mg TID for at least 6 weeks from 11/22/2022 - 01/03/2023     #Normocytic anemia (?anemia of chronic disease) with acute blood loss anemia  - pre-operatively had hgb of 9.9 on 11/14.   -patient has received 4 units of prbc since admission  -Hemoglobin has since stabilized at 7.5. At 7.8 on 11/29  -transfuse if <7    Outpatient w/u once acute illness resolves    Continue to monitor while resuming eliquis today      Acute hypoxic respiratory failure, resolved   - Most largely 2/to hypoventilation with background resistive lung disease given body habitus.  - VBG unremarkable w/o hypercarbia  - Lungs largely clear to auscultation bilaterally   - Does not wear O2 at home    incentive spirometry, monitor respiratory rate with continuous oximetry    oxymask as needed    PTA lasix 20 mg every day      #HALEY - resolved  Likely pre-renal given volume loss; though patient appears hypervolemic  - cont to monitor     Constipation    Daria-Colace twice daily    MiraLAX daily    Lactulose 3 times daily    Milk of mag as needed    JAIR with obesity- home CPAP- see above     DM2  - PTA med list includes metformin, glipizide, pioglitazone, victoza  -hold home meds presently  -sliding scale insulin TID with meals and at bedtime but in the days just prior to discharge( requires minimal amounts), could consider resuming a few of his home diabetes meds  -A1c 6.8 this admission  -may be able to add some glargine once insulin needs more delineated     CAD  - continue statin, hold aspirin until cleared by surgery team     #History of DVT/PE  - previously on DOAC (eliquis) but this was held perioperatively  -If Eliquis to be resumed, the schedule is as below    Eliquis 2.5 mg bid x14 days and then eliquis 5 mg bid (starting 11/28)    Discontinue ASA       #Venous insufficiency with signs of stasis dermatitis  Erythematous patches on bilateral lower extremities - left anterior lower leg and smaller area on right lower leg  Low suspicion for cellulitis and more consistent with chronic stasis dermatitis  - PTA med list includes furosemide resumed today  - compression stockings     #Lack of motivation  - depression screen negative    Trial of low dose ritalin (not home med)     #Respiratory acidosis-- intra-operative blood gases with high CO2 and low pH. Resolved.   #Hypocalcemia- will check ionized calcium in AM- could be due to blood products or could be due to low albumin  -recheck in AM  #Hyperkalemia- mild and intermittent on the intra-op labs. Asymptomatic  -recheck in AM.        Diet: Advance Diet as Tolerated: Regular Diet Adult    DVT Prophylaxis: Resuming Eliquis today.  Discontinue aspirin to decrease bleeding risk  Tran Catheter: Not present  Central Lines: None  Cardiac Monitoring: None  Code Status: Full Code   "    Disposition Plan      Expected Discharge Date: 12/02/2022        Discharge Comments: Needs to participate in therapy.  Needs o2 at this time      The patient's care was discussed with the Patient.    Gi Bean MD  Hospitalist Service, GOLD TEAM [address] [place] [address]  Securely message with the Vocera Web Console (learn more here)  Text page via [facility] Paging/Directory   Please see signed in provider for up to date coverage information      Clinically Significant Risk Factors              # Hypoalbuminemia: Lowest albumin = 2.4 g/dL (Ref range: 3.5-5.2) at 11/23/2022  6:12 AM, will monitor as appropriate         # DMII: A1C = 6.8 % (Ref range: 0.0 - 5.6 %) within past 3 months   # Severe Obesity: Estimated body mass index is 46.53 kg/m  as calculated from the following:    Height as of this encounter: 1.778 m (5' 10\").    Weight as of this encounter: 147.1 kg (324 lb 4.8 oz).          ______________________________________________________________________    Interval History   No new complaints overnight. Discussed about the need to participate in therapy   Patient is insisting that there is a communication barrier  Discussed the goals of hospital stay   No fever or chills      Data reviewed today: I reviewed all medications, new labs and imaging results over the last 24 hours.      Physical Exam   Vital Signs: Temp: 96.8  F (36  C) Temp src: Oral BP: 132/45 Pulse: 77   Resp: 16 SpO2: 96 % O2 Device: None (Room air) Oxygen Delivery: 3 LPM  Weight: 324 lbs 4.75 oz    General Appearance: Obese male, lying in bed, comfortably in bed, on room air, in no acute distress of discomfort  HEENT: PERRL: EOMI; moist mucous membrane w/o lesions  Neck: No JVD  Pulmonary: Clear to auscultation bilaterally, no wheezes or crackles  CVS: Regular rhythm, no murmurs, rubs or gallops  GI: BS (+), soft nontender, no rebound or guarding   Extremities: Left lateral hip in dressing " c/d/i; drain in place.  Skin: No rashes or lesions  Neurologic: A&O x3      Data   Recent Labs   Lab 11/30/22  0803 11/30/22  0642 11/30/22  0207 11/29/22  1755 11/29/22  0926 11/29/22  0547 11/28/22  0951 11/28/22  0553 11/27/22  1122 11/27/22  0824 11/26/22  1022 11/26/22  0641 11/25/22  1010 11/25/22  0911   WBC  --   --   --   --   --  10.3  --  9.9  --   --   --   --   --  13.0*   HGB  --   --   --   --   --  7.8*  --  7.5*  --  7.5*  --  6.8*  --  6.3*   MCV  --   --   --   --   --  97  --  95  --   --   --   --   --  95   PLT  --   --   --   --   --  331  --  278  --   --   --   --   --  245   NA  --   --   --   --   --   --   --  140  --   --   --  140  --  139   POTASSIUM  --   --   --   --   --   --   --  4.3  --   --   --  4.8  --  4.5   CHLORIDE  --   --   --   --   --   --   --  107  --   --   --  107  --  108   CO2  --   --   --   --   --   --   --  31  --   --   --  29  --  25   BUN  --   --   --   --   --   --   --  21  --   --   --  17  --  17   CR  --  1.02  --   --   --  1.01  --  1.06  --   --    < > 1.08  1.08  --  1.09   ANIONGAP  --   --   --   --   --   --   --  2*  --   --   --  4  --  6   MAIKOL  --   --   --   --   --   --   --  8.2*  --   --   --  8.3*  --  8.0*   *  --  156* 146*   < >  --    < > 215*   < >  --    < > 201*   < > 217*    < > = values in this interval not displayed.     No results found for this or any previous visit (from the past 24 hour(s)).  Medications       apixaban ANTICOAGULANT  2.5 mg Oral BID    Followed by     [START ON 12/12/2022] apixaban ANTICOAGULANT  5 mg Oral BID     atorvastatin  40 mg Oral Daily     furosemide  20 mg Oral Daily     insulin aspart  1-7 Units Subcutaneous TID AC     insulin aspart  1-5 Units Subcutaneous At Bedtime     methylphenidate  5 mg Oral QAM AC     piperacillin-tazobactam  3.375 g Intravenous Q6H     polyethylene glycol  17 g Oral Daily     senna-docusate  2 tablet Oral BID     sodium chloride (PF)  3 mL Intracatheter Q8H

## 2022-11-30 NOTE — PROGRESS NOTES
VS: Vitals stable   O2: Sating 93 % on O2 2LNC then 2L via CPAP. Lung sounds clear. Ocassional productive cough, encouraged pt to spit out the phlegm.Denies chest pain and SOB.   Output: Voids spontaneously and adequately primofit in place   Last BM: Large BM 11/28. Bowel sounds active x4. Passing flatus.   Activity: No oob this shift   Skin: Javy daniella LE, popped blisters Lt inner thigh drying, Lt hip surgical incision   Pain: Pain was managed with oxycodone   Neuro: A&O x4. Denies N/T.   Dressing: Dressing to Lt hip C/D/I.   Diet: Regular.  at 2am. Denies N/V.   LDA: PIV to Rt hand is SL.   Equipment: IV pole, gait belt and personal belongings.   Plan: Continue to monitor. Call light within reach and utilized appropriately   Additional Info:              00:54 Pt dropped phone to the floor and the screen cracked at the top-left corner. Writer at the foot of the bed getting clip board of med cart. Another Rn (Armando )said cell phone already cracked yesterday. Pt offered to change brief it soiled. Pt declined and said he is not soiled.      Marcelo under pt changed but pt refused to roll or to be lifted so the wet towel can be removed from under him.  Pt also refused to have marcelo under left hip changed, serosang drainage from GUERRERO insertion site. Clean Marcelo tucked under pt so he is not lying on marcelo with drainage. Explained that it can be a source of infection to the hip.  Pt still refused.    06:40 Pt put on bedpan and left for a few minutes. Advised pt to call when done. Went back to room to take pt off bedpan and pt stated that he did not understand that he was on bedpan and trying to go. Pt left on the bedpan and instructed to call when done. Pt verbalized understanding. Yentin to check on pt since it's report time.    Spot checking sat throughout the night , pt sating above 94% on O2   L bled through CPAP. Pt refusing frequent repositioning.

## 2022-11-30 NOTE — PLAN OF CARE
VS: Temp: 97  F (36.1  C) Temp src: Oral BP: 121/63 Pulse: 78   Resp: 16 SpO2: 99 % O2 Device: None (Room air)      O2: >90% on RA when aware. Uses BiPAP at 3LPM when sleeping. Denies SOB and CP, no cough present. Lung sounds clear.   Output: External catheter in place with adequate output   Last BM: 11/28/2022, refused bowel meds   Activity: Not oob. Pt refuses to turn or move around in bed. Pt refuses repositioning   Skin: L) hip surgical incision and drain site, bruising to L) hip, scrape to R) shin and jonathan BLEs   Pain: Managed with oxycodone, tylenol and robaxin   Neuro: A & O x4, reports numbness to BLEs and RUE.   Dressing: CDI   Diet: Regular, BG checks   LDA: R) PIV infusing TKO, L) hip GUERRERO drain   Equipment: Personal belongings, call light, lift, external catheter, GUERRERO drain   Plan: TBD, planning to work with therapies tomorrow   Additional Info:

## 2022-11-30 NOTE — PROGRESS NOTES
Pt assisted off the bedpan. Pt had a small loose Bm. Pt refused to roll on side and getting agitated. Writer advised pt not to rotate lt leg outwads but keep his toes pointing upwards. Assisted repositioning with pillows btwn legs.

## 2022-11-30 NOTE — PROGRESS NOTES
"Orthopedic Surgery Progress Note: 11/30/2022    Subjective:   No acute events overnight. Tolerating a diet. No new concerns or complaints. Denies SOB, chest Pain, fevers, chills.    Objective:   /48 (BP Location: Left arm, Patient Position: Semi-Fuentes's)   Pulse 70   Temp (!) 96.5  F (35.8  C) (Oral)   Resp 16   Ht 1.778 m (5' 10\")   Wt 147.1 kg (324 lb 4.8 oz)   SpO2 93%   BMI 46.53 kg/m    No intake/output data recorded.  General: NAD. Resting comfortably in bed.  Respiratory: Breathing comfortably on RA.  Musculoskeletal:    Focused Exam of Left Lower Extremity  Dressing C/D/I.   Fires EHL, FHL, TA, GSC, Quad  SILT DP, SP, Saph, Sural, Tibial Nerve Distributions  1+ DP pulse, foot WWP. 1+ edema leg.     Output by Drain (mL) 11/28/22 0700 - 11/28/22 1459 11/28/22 1500 - 11/28/22 2259 11/28/22 2300 - 11/29/22 0659 11/29/22 0700 - 11/29/22 1459 11/29/22 1500 - 11/29/22 2259 11/29/22 2300 - 11/30/22 0641   Closed/Suction Drain 1 Left Hip Bulb 15 French 50    100          Laboratory Data:  Lab Results   Component Value Date    WBC 10.3 11/29/2022    HGB 7.8 (L) 11/29/2022     11/29/2022         Assessment & Plan:   Waldo Bustos is a 72 year old male s/p Explantation of Chronic  Infected Left Total Hip Arthroplasty, Extended Trochanteric Osteotomy with Extensive Debridement and Reconstruction Using Antibiotic Cement Spacer on 11/22/2022 with Dr. Meyers.     11/30: Acute blood loss anemia, stabilized. Eliquis resumed on 11/29, will follow Hgb with new anticoagulation.  Hemoglobin stabilized  - Drain to remain in place  - Labs as needed going forward  - ID final recommendations in (see below), continue Zosyn while in hospital    Cultures: Proteus mirabilis    ----Infectious disease recommendations below----  -stop vancomycin given Proteus and anaerobes are covered by zosyn  -can discharge patient on oral ciprofloxacin 500 mg bid and flagyl 500 mg TID for at least 6 weeks from 11/22  -follow up " with ID in 5 weeks, ideally ~1 week prior to completion of antibiotics      Activity: Up with assist and assistive devices as needed until independent.  Weight bearing status: NWB LLE  Antibiotics: Per ID, Zosyn in-house, for discharge oral ciprofloxacin 500 mg bid and flagyl 500 mg TID   Diet: Begin with clear fluids and progress diet as tolerated. Bowel regimen. Anti-emetics PRN.    DVT prophylaxis: Initially on  mg (postop), Eliquis 2.5 mg BID x2 weeks after surgery (started on 11/28), then 5 mg BID PTA resumption, initially held for anemia  Elevation: Elevate heels off of bed on pillows   Wound Care: Alginate, tegaderm to be changed PRN by ortho  Drains: 15f GUERRERO drain  Tran: Removed POD1  Pain management: Orals PRN, IV for breakthrough only  X-rays: AP Pelvis and lateral in PACU complete  Physical Therapy: Mobilization, ROM, ADL's  Occupational Therapy: ADL's  Labs: Trend Hgb on POD #1, 2  Consults: PT, OT. ID. Hospitalist, appreciate assistance in caring for this patient throughout the perioperative period-     Disposition: Pending progress with therapies, pain control on orals, and medical stability; anticipate discharge to likely TCU after Hgb stabilized. ID plan in place.     Orthopedic surgery staff for this patient is Dr. Meyers.    ------------------------------------------------------------------------------------------  Damian Frank MD  PGY-4 Orthopaedic Surgery

## 2022-12-01 NOTE — PLAN OF CARE
"VS: BP (!) 101/31 (BP Location: Left arm)   Pulse 61   Temp 97.5  F (36.4  C) (Oral)   Resp 16   Ht 1.778 m (5' 10\")   Wt 147.1 kg (324 lb 4.8 oz)   SpO2 93%   BMI 46.53 kg/m     O2: O2> 95% ORA, denies feeling SOB, lightheadedness, lungs clear, equal bilateral   On CPAP at night    Output: Voids spontaneously in external catheter - Primo fit    Last BM: 12/1 loose stool, has been declining bowel meds in past 3 days    Activity: Ax2-3 with cares, has difficulty with turning in bed, cites pain in L leg as main barrier, given pain meds prior to therapy but still reported pain being too high to turn    Up for meals? Pt is able to sit up for meals   Skin: Edema +2 bilat in legs   Scab on inner L thigh   Discoloration to feet bilateral    Pain: Managed w/ prn OXY    CMS: AOx4, reports baseline numbness in feet and R hand    Dressing: L hip dressing, scant dried drainage by L buttock, intact    Diet: Reg, on glucose checks before meals, sliding scale   Reported not feeling hungry today, did not want to order breakfast or lunch this shift.    LDA: L hip GUERRERO drain - 50mL output   External catheter - primo fit  R wrist PIV - TKO w/ intermittent antibiotics.     Plan:    Additional Info:           "

## 2022-12-01 NOTE — PLAN OF CARE
"VS: /51   Pulse 68   Temp (!) 96.3  F (35.7  C) (Oral)   Resp 16   Ht 1.778 m (5' 10\")   Wt 147.1 kg (324 lb 4.8 oz)   SpO2 92%   BMI 46.53 kg/m     Low BPs around bedtime, Crosscover paged twice. Pt asymptomatic.   O2: SpO2 > 90% and stable on RA while awake, BIPAP 3L O2 while asleep. Spot checks. LS clear and equal bilaterally. Denies chest pain and SOB. IS encouraged.   Output: Voids spontaneously without difficulty via external catheter, adequate output.    Last BM: 11/30 per nursing documentation; uses bedpan; denies abdominal discomfort. Declined scheduled senna. +FL    Activity: Not OOB. A2-3 with cares and activity. Ceiling lift. Refused repositioning this shift, activity encouraged.   Hx of DVT, on Eliquis  Plan to work with therapy today.   Skin: WDL except, surgical incision and drain to L hip. Jayv BLE.   DEEP posterior skin d/t pt not moving.   Pain: Pain managed with PRN Oxycodone, Tylenol, and Robaxin. Declines use of ice packs.    CMS: Intact, AOx4. Baseline numbness to BLE and RUE. Pt slept most of shift.    Dressing: CDI   Diet: Regular diet, ate 100% of dinner. Denies nausea/vomiting.   On insulin sliding scale. BG checks before meals and at bedtime. BG check at bedtime 248, BG overnight 278   LDA: R PIV infusing TKO btwn ABX (Zosyn Q6H); GUERRERO drain output 60mL   Equipment: IV pole, personal belongings, bariatric bed, bariatric bedpan, bariatric BSC, lift, external cath   Plan: TBD. Continue with plan of care. Call light within reach, pt able to make needs known.    Additional Info:         "

## 2022-12-01 NOTE — PROGRESS NOTES
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 Internal Medicine was consulted for post-operative medical management.      #Chronically infected left total hip arthroplasty now s/p explantation + extended debridement/reconstruction with antibiotic cement spacer (11/22/22)  -Antibiotics per primary team and ID  - cont Vanc & pip/tazo  -Post-op pain control per primary team  -Drain management per primary team    follow-up cultures    Currently growing pan-sensitive proteus, now with additional Finegoldia Magna ( Anaerobic gram positive). Will discuss with ID team for appropriate coverage     Stopped Vancomycin    Cont Zosyn (11/23 - present)    On discharge, will recommend switching patient to oral ciprofloxacin 500 mg bid and flagyl 500 mg TID for at least 6 weeks from 11/22/2022 - 01/03/2023     #Normocytic anemia (?anemia of chronic disease) with acute blood loss anemia  - pre-operatively had hgb of 9.9 on 11/14.   -patient has received 4 units of prbc since admission  -Hemoglobin has since stabilized at 7.5. At 7.8 on 11/29  -transfuse if <7    Outpatient w/u once acute illness resolves    Continue to monitor while resuming eliquis today      Acute hypoxic respiratory failure, resolved   - Most largely 2/to hypoventilation with background resistive lung disease given body habitus.  - VBG unremarkable w/o hypercarbia  - Lungs largely clear to auscultation bilaterally   - Does not wear O2 at home    incentive spirometry, monitor respiratory rate with continuous  oximetry    oxymask as needed    PTA lasix 20 mg every day     #HALEY - resolved  Likely pre-renal given volume loss; though patient appears hypervolemic  - cont to monitor     Constipation    Daria-Colace twice daily    MiraLAX daily    Lactulose 3 times daily    Milk of mag as needed    JAIR with obesity- home CPAP- see above     DM2  - PTA med list includes metformin, glipizide, pioglitazone, victoza  -hold home meds presently  -sliding scale insulin TID with meals and at bedtime but in the days just prior to discharge( requires minimal amounts), could consider resuming a few of his home diabetes meds  -A1c 6.8 this admission  -may be able to add some glargine once insulin needs more delineated     CAD  - continue statin, hold aspirin until cleared by surgery team     #History of DVT/PE  - previously on DOAC (eliquis) but this was held perioperatively  -If Eliquis to be resumed, the schedule is as below    Eliquis 2.5 mg bid x14 days and then eliquis 5 mg bid (starting 11/28)    Discontinue ASA       #Venous insufficiency with signs of stasis dermatitis  Erythematous patches on bilateral lower extremities - left anterior lower leg and smaller area on right lower leg  Low suspicion for cellulitis and more consistent with chronic stasis dermatitis  - PTA med list includes furosemide resumed today  - compression stockings     #Lack of motivation  - depression screen negative    Trial of low dose ritalin (not home med)     #Respiratory acidosis-- intra-operative blood gases with high CO2 and low pH. Resolved.   #Hypocalcemia- will check ionized calcium in AM- could be due to blood products or could be due to low albumin  -recheck in AM  #Hyperkalemia- mild and intermittent on the intra-op labs. Asymptomatic  -recheck in AM.        Diet: Advance Diet as Tolerated: Regular Diet Adult    DVT Prophylaxis: Resuming Eliquis today.  Discontinue aspirin to decrease bleeding risk  Tran Catheter: Not present  Central Lines:  "None  Cardiac Monitoring: None  Code Status: Full Code      Disposition Plan     Expected Discharge Date: 12/02/2022        Discharge Comments: Needs to participate in therapy.  Needs o2 at this time      The patient's care was discussed with the Patient.    Gi Bean MD  Hospitalist Service, GOLD TEAM [address] [place] [address]  Securely message with the Vocera Web Console (learn more here)  Text page via [facility] Paging/Directory   Please see signed in provider for up to date coverage information      Clinically Significant Risk Factors              # Hypoalbuminemia: Lowest albumin = 2.4 g/dL (Ref range: 3.5-5.2) at 11/23/2022  6:12 AM, will monitor as appropriate         # DMII: A1C = 6.8 % (Ref range: 0.0 - 5.6 %) within past 3 months   # Severe Obesity: Estimated body mass index is 46.53 kg/m  as calculated from the following:    Height as of this encounter: 1.778 m (5' 10\").    Weight as of this encounter: 147.1 kg (324 lb 4.8 oz).          ______________________________________________________________________    Interval History   No new complaints overnight. Patient has been more motivated to participate in therapy today  Has been eating and drinking well       Data reviewed today: I reviewed all medications, new labs and imaging results over the last 24 hours.      Physical Exam   Vital Signs: Temp: 97.5  F (36.4  C) Temp src: Oral BP: (!) 101/31 Pulse: 61   Resp: 16 SpO2: 93 % O2 Device: BiPAP/CPAP Oxygen Delivery: 3 LPM  Weight: 324 lbs 4.75 oz    General Appearance: Obese male, lying in bed, comfortably in bed, on room air, in no acute distress of discomfort  HEENT: PERRL: EOMI; moist mucous membrane w/o lesions  Neck: No JVD  Pulmonary: Clear to auscultation bilaterally, no wheezes or crackles  CVS: Regular rhythm, no murmurs, rubs or gallops  GI: BS (+), soft nontender, no rebound or guarding   Extremities: Left lateral hip in dressing c/d/i; drain in " place.  Skin: No rashes or lesions  Neurologic: A&O x3      Data   Recent Labs   Lab 12/01/22  1039 12/01/22  0744 12/01/22  0207 11/30/22  0803 11/30/22  0642 11/29/22  0926 11/29/22  0547 11/28/22  0951 11/28/22  0553 11/27/22  1122 11/27/22  0824 11/26/22  1022 11/26/22  0641 11/25/22  1010 11/25/22  0911   WBC  --   --   --   --   --   --  10.3  --  9.9  --   --   --   --   --  13.0*   HGB  --   --   --   --   --   --  7.8*  --  7.5*  --  7.5*  --  6.8*  --  6.3*   MCV  --   --   --   --   --   --  97  --  95  --   --   --   --   --  95   PLT  --   --   --   --   --   --  331  --  278  --   --   --   --   --  245   NA  --   --   --   --   --   --   --   --  140  --   --   --  140  --  139   POTASSIUM  --   --   --   --   --   --   --   --  4.3  --   --   --  4.8  --  4.5   CHLORIDE  --   --   --   --   --   --   --   --  107  --   --   --  107  --  108   CO2  --   --   --   --   --   --   --   --  31  --   --   --  29  --  25   BUN  --   --   --   --   --   --   --   --  21  --   --   --  17  --  17   CR  --   --   --   --  1.02  --  1.01  --  1.06  --   --    < > 1.08  1.08  --  1.09   ANIONGAP  --   --   --   --   --   --   --   --  2*  --   --   --  4  --  6   MAIKOL  --   --   --   --   --   --   --   --  8.2*  --   --   --  8.3*  --  8.0*   * 225* 278*   < >  --    < >  --    < > 215*   < >  --    < > 201*   < > 217*    < > = values in this interval not displayed.     No results found for this or any previous visit (from the past 24 hour(s)).  Medications       apixaban ANTICOAGULANT  2.5 mg Oral BID    Followed by     [START ON 12/12/2022] apixaban ANTICOAGULANT  5 mg Oral BID     atorvastatin  40 mg Oral Daily     furosemide  20 mg Oral Daily     insulin aspart  1-7 Units Subcutaneous TID AC     insulin aspart  1-5 Units Subcutaneous At Bedtime     methylphenidate  5 mg Oral QAM AC     piperacillin-tazobactam  3.375 g Intravenous Q6H     polyethylene glycol  17 g Oral Daily     senna-docusate  2  tablet Oral BID     sodium chloride (PF)  3 mL Intracatheter Q8H

## 2022-12-01 NOTE — PROGRESS NOTES
"Orthopedic Surgery Progress Note: 12/01/2022    Subjective:   No acute events overnight. Tolerating a diet. No new concerns or complaints. Denies SOB, chest Pain, fevers, chills.    Objective:   /45   Pulse 60   Temp (!) 96.3  F (35.7  C) (Oral)   Resp 16   Ht 1.778 m (5' 10\")   Wt 147.1 kg (324 lb 4.8 oz)   SpO2 93%   BMI 46.53 kg/m    I/O this shift:  In: -   Out: 435 [Urine:375; Drains:60]  General: NAD. Resting comfortably in bed.  Respiratory: Breathing comfortably on RA.  Musculoskeletal:    Focused Exam of Left Lower Extremity  Dressing C/D/I.   Fires EHL, FHL, TA, GSC, Quad  SILT DP, SP, Saph, Sural, Tibial Nerve Distributions  1+ DP pulse, foot WWP. 1+ edema leg.     Output by Drain (mL) 11/29/22 0700 - 11/29/22 1459 11/29/22 1500 - 11/29/22 2259 11/29/22 2300 - 11/30/22 0659 11/30/22 0700 - 11/30/22 1459 11/30/22 1500 - 11/30/22 2259 11/30/22 2300 - 12/01/22 0637   Closed/Suction Drain 1 Left Hip Bulb 15 French  100  50  60         Laboratory Data:  Lab Results   Component Value Date    WBC 10.3 11/29/2022    HGB 7.8 (L) 11/29/2022     11/29/2022         Assessment & Plan:   Waldo Bustos is a 72 year old male s/p Explantation of Chronic  Infected Left Total Hip Arthroplasty, Extended Trochanteric Osteotomy with Extensive Debridement and Reconstruction Using Antibiotic Cement Spacer on 11/22/2022 with Dr. Meyers.     12/1: Acute blood loss anemia, stabilized. Eliquis resumed on 11/29, will follow Hgb with new anticoagulation.  Hemoglobin stabilized  - Drain to remain in place  - Labs as needed going forward  - ID final recommendations in (see below), continue Zosyn while in hospital    Cultures: Proteus mirabilis    ----Infectious disease recommendations below----  -stop vancomycin given Proteus and anaerobes are covered by zosyn  -can discharge patient on oral ciprofloxacin 500 mg bid and flagyl 500 mg TID for at least 6 weeks from 11/22  -follow up with ID in 5 weeks, ideally ~1 " week prior to completion of antibiotics      Activity: Up with assist and assistive devices as needed until independent.  Weight bearing status: NWB LLE  Antibiotics: Per ID, Zosyn in-house, for discharge oral ciprofloxacin 500 mg bid and flagyl 500 mg TID   Diet: Begin with clear fluids and progress diet as tolerated. Bowel regimen. Anti-emetics PRN.    DVT prophylaxis: Initially on  mg (postop), Eliquis 2.5 mg BID x2 weeks after surgery (started on 11/28), then 5 mg BID PTA resumption, initially held for anemia  Elevation: Elevate heels off of bed on pillows   Wound Care: Alginate, tegaderm to be changed PRN by ortho  Drains: 15f GUERRERO drain  Tran: Removed POD1  Pain management: Orals PRN, IV for breakthrough only  X-rays: AP Pelvis and lateral in PACU complete  Physical Therapy: Mobilization, ROM, ADL's  Occupational Therapy: ADL's  Labs: Trend Hgb on POD #1, 2  Consults: PT, OT. ID. Hospitalist, appreciate assistance in caring for this patient throughout the perioperative period-     Disposition: Pending progress with therapies, pain control on orals, and medical stability; anticipate discharge to likely TCU after Hgb stabilized. ID plan in place.     Orthopedic surgery staff for this patient is Dr. Meyers.    ------------------------------------------------------------------------------------------  Damian Frank MD  PGY-4 Orthopaedic Surgery

## 2022-12-01 NOTE — PLAN OF CARE
VS: Temp: 96.8  F (36  C) Temp src: Oral BP: 128/49 Pulse: 69   Resp: 18 SpO2: 92 % O2 Device: None (Room air)      O2: >90% on RA when aware. Uses BiPAP at 3LPM when sleeping. Denies SOB and CP, no cough present. Lung sounds clear.   Output: External catheter in place with adequate output   Last BM: 12/1/2022   Activity: Not oob. Pt refuses to turn or move around in bed. Pt refuses repositioning. Pt allowed staff to help pt boost up in bed once this shift.   Skin: L) hip surgical incision and drain site, bruising to L) hip, scrape to R) shin and jonathan BLEs   Pain: Managed with oxycodone, tylenol and robaxin   Neuro: A & O x4, reports numbness to BLEs and RUE.   Dressing: CDI   Diet: Regular, BG checks   LDA: R) PIV infusing TKO, L) hip GUERRERO drain with 50ml of output this shift   Equipment: Personal belongings, call light, lift, external catheter, GUERRERO drain   Plan: TBD   Additional Info:

## 2022-12-01 NOTE — PLAN OF CARE
"BP (!) 104/32 (BP Location: Left arm)   Pulse 74   Temp (!) 96.3  F (35.7  C) (Oral)   Resp 16   Ht 1.778 m (5' 10\")   Wt 147.1 kg (324 lb 4.8 oz)   SpO2 95%   BMI 46.53 kg/m       Writer notified of low /28 by NST.  Pt currently asymptomatic, AOx4. Denies CP, SOB, HA, dizziness, N/V, vision changes. [employer]azz[employer] paged.    6118  BP (!) 103/39   Pulse 64   Temp (!) 96.3  F (35.7  C) (Oral)   Resp 16   Ht 1.778 m (5' 10\")   Wt 147.1 kg (324 lb 4.8 oz)   SpO2 95%   BMI 46.53 kg/m     Page sent to [employer].   "

## 2022-12-01 NOTE — CONSULTS
GENERAL ID SERVICE: NEW CONSULTATION  Patient:  Waldo Bustos, Date of birth 1950, Medical record number [id number]  Date of Admission: 11/22/2022  Date of Visit:  12/1/2022  Requesting Provider: Rom Meyers         Assessment and Recommendations:     ID Problem List:  1. Chronic Left hip PJI   - s/p  Explantation of Chronic  Infected Left Total Hip Arthroplasty, Extended Trochanteric Osteotomy with Extensive Debridement and Reconstruction Using Antibiotic Cement Spacer on 11/22/2022 .  intra op left hip tissues cultures Proteus mirabilis, Finegoldia magna.  - 11/14/22 - left hip aspirate 11/14/22 - 3+ Proteus mirabilis , 4+ Porphyromonas sp.      2. Left hip arthroplasty 3/7/2018   - 1/23/2019, revision left total hip arthroplasty . Cutibacterium acnes s/p  4 weeks of Ceftriaxone  - 2/2021, L hip aspiration   - 4/6/2022, C acnes  - 4/27/2022, drain placement. C acnes.     3. Post Op anemia Hb 6.6   4. Acute renal insufficiency Cr 1.29  5.  Diabetes mellitus HbA1c 6.6  6. CAD   7. Obesity BMI 46  8. JAIR   9. Hx of DVT/PE   10. venous stasis / venous insufficiency    Discussion:  73 yo M with chronic L hip PJI, s/p prosthesis removed and a spaced placed 11/22. Intra-op cultures grew Proteus mirabilis (pan sensitive) and Finegoldia magna. Currently on iv piptazo while inpatient, and per last ID note, recommended to discharge on ciprofloxacin and flagyl to complete total 6 weeks of course (since good oral bioavailability with these antibiotics). ID team is re-consulted today, because of anaerobes that are now identified and if requires to adjust antibiotics. Plan to discharge to TCU, but may take a few days to a week. I recommend to switch antibiotics to po ciprofloxacin + metronidazole, which would provide adequate coverage against Proteus mirabilis, and Finegoldia magna (+other anaerobes).     Recommendations:  1. Discontinue iv pip-tazo  2. Start po ciprofloxacin 500mg BID and po metronidazole 500mg  "TID  3. Duration of total 6 weeks, from 11/22/2022.   4. Follow up with outpatient ID clinic, around 1 week prior to completion of antibiotics. Should see either Nikko Velasco, myself or fellows' clinic.     Thank you for this consult.   ID team will sign off at this time. Please reach out if any questions or concerns.     Tsering Hanson MD  Infectious Diseases Staff Physician  Pager: [phone]       Interval History:     ID team re-visited Mr. Bustos today. Reports feeling pain everywhere, and with any simple movement. States how challenging it is to participate with PT/OT. No fever, chills, sweating.        History of Present Illness:     As per ID consult note on 11/23/2022:  \"Mr. Waldo Bustos is an 72 year old male that was admitted to the [facility] due to infection with his left hip prosthetic. He is s/p orthopaedic surgery extension of trochanteric osteotomy with extensive debridement and placement of prosthetic antibiotic cement spacer. 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  Left hip was aspirated on 11/14/22  Culture grew  Proteus mirabilis, Porphyromonas species, gram stain showed gram negative bacilli, gram positive bacilli, gram positive cocci, and WBC. During the ROS with the patient he stated that he has been experiencing a headache for the last two hours and the he feels his vision is becoming more blurry with and without his glasses. He went on to state he feels fatigued physically.  Medication lis reveals patient is being administered zosyn. Blood culture taken on 11/14/22 for aerobic and anaerobic bacteria, they returned positive results for Proteus mirabilis, Porphyromonas species, gram negative bacilli, gram positive bacilli, gram positive cocci, and WBC. GFR estimate is measured at 59 and HbA1c measured at 6.8 as of 11/23/22. Tissue cultures were taken from the surgical site.     Imaging:   XR pelvis and hip, left side " "     FINDINGS: AP pelvis and frog-leg lateral left hip views were obtained.     Surgical drain tip projects over the lateral soft tissues. Total left  hip arthroplasty. Radiographic appearance similar to 1/27/2022 with  subtle bone loss along the greater tuberosity and proximal medial  femoral stem. Mild to moderate right hip degenerative change. Vascular  calcifications.       XR Pelvis Port 1/2   IMPRESSION: Postop changes related to explantation of left total hip arthroplasty with proximal left femur osteotomy transfixed with 2 cerclage wires. Presumably antibiotic impregnated cement spacer present in the acetabular fossa. A surgical drain   projects over the operative site. Unchanged right hip joint space. No displaced pelvic fracture identified. Several chronic bone fragments project along the inferior margin of the left ischial tuberosity. Arterial calcification. Expected postop soft   tissue gas and swelling about the left hip.                                                                   IMPRESSION: Total left hip arthroplasty appears similar to 1/27/2022.  Recent culture results include:  No results found for: CULT\"         Review of Systems:   Complete 12 point review of systems negative except as noted in HPI.        Past Medical History:     Past Medical History:   Diagnosis Date     Arthritis 5 years ago     Chronic osteoarthritis Not sure     Diabetes (H) 10-12 years ago     DVT (deep venous thrombosis) (H)      Dyslipidemia      Gastroesophageal reflux disease      History of blood transfusion      Iliopsoas abscess (H)      Infection of prosthetic hip joint (H)      JAIR (obstructive sleep apnea)      Pulmonary embolism (H)      RA (rheumatoid arthritis) (H) Not sure     Reduced vision 2-3 years ago    Cataract Surgery on both eyes     Venous insufficiency          Allergies:      Allergies   Allergen Reactions     Sulfa Drugs      Other reaction(s): *Unknown - Childhood Rxn     Tizanidine Other " "(See Comments)     Frequent urination; causes drowsiness, and dry mouth             Recent Antimicrobials::     Vancomycin (11/22-25)  Pip-tazo (11/22-present)        Family History:   Reviewed and noncontributory.   Family History   Adopted: Yes   Problem Relation Age of Onset     Diabetes No family hx of      Rheumatoid Arthritis No family hx of      Low Back Problems No family hx of      Unknown/Adopted No family hx of             Social History:     Social History     Socioeconomic History     Marital status:      Spouse name: Not on file     Number of children: Not on file     Years of education: Not on file     Highest education level: Not on file   Occupational History     Not on file   Tobacco Use     Smoking status: Never     Smokeless tobacco: Never   Substance and Sexual Activity     Alcohol use: No     Drug use: No     Sexual activity: Never   Other Topics Concern     Not on file   Social History Narrative     Not on file     Social Determinants of Health     Financial Resource Strain: Not on file   Food Insecurity: Not on file   Transportation Needs: Not on file   Physical Activity: Not on file   Stress: Not on file   Social Connections: Not on file   Intimate Partner Violence: Not on file   Housing Stability: Not on file              Physical Exam:   BP (!) 101/31 (BP Location: Left arm)   Pulse 61   Temp 97.5  F (36.4  C) (Oral)   Resp 16   Ht 1.778 m (5' 10\")   Wt 147.1 kg (324 lb 4.8 oz)   SpO2 93%   BMI 46.53 kg/m     Exam:  GENERAL:  Well-developed, well-nourished, sitting in bed in no acute distress.   ENT:  Head is normocephalic, atraumatic. Oropharynx is moist without exudates or ulcers.  EYES:  Eyes have anicteric sclerae.    NECK:  Supple.  LUNGS:  Clear to auscultation.  CARDIOVASCULAR:  Regular rate and rhythm with no murmurs, gallops or rubs.  ABDOMEN:  Normal bowel sounds, soft, nontender.  EXT: Extremities warm and without edema. LLE, lateral aspect of thigh- surgical " incision covered in dressing, +drain in place  SKIN:  No acute rashes.  Line is in place without any surrounding erythema.  NEUROLOGIC:  Grossly nonfocal.    Labs, Microbiology and Imaging studies reviewed.          Laboratory Data:   Metabolic Studies       Recent Labs   Lab Test 12/01/22  1039 11/30/22  0803 11/30/22  0642 11/29/22  0926 11/29/22  0547 11/28/22  0951 11/28/22  0553 11/26/22  1022 11/26/22  0641 11/23/22  0939 11/23/22  0612 11/22/22 2016 11/22/22  1925 11/22/22  1906 11/22/22  1644   NA  --   --   --   --   --   --  140  --  140   < > 137  --  139  --  140   POTASSIUM  --   --   --   --   --   --  4.3  --  4.8   < > 5.8*  --  5.3*  --  4.7   CHLORIDE  --   --   --   --   --   --  107  --  107   < > 106  --   --   --   --    CO2  --   --   --   --   --   --  31  --  29   < > 28  --   --   --   --    ANIONGAP  --   --   --   --   --   --  2*  --  4   < > 3  --   --   --   --    BUN  --   --   --   --   --   --  21  --  17   < > 30  --   --   --   --    CR  --   --  1.02  --  1.01  --  1.06   < > 1.08  1.08   < > 1.29*  --   --   --   --    GFRESTIMATED  --   --  79  --  80  --  75   < > 73  73   < > 59*  --   --   --   --    *   < >  --    < >  --    < > 215*   < > 201*   < > 258*   < > 217*   < > 205*   A1C  --   --   --   --   --   --   --   --   --   --  6.8*  --   --   --   --    MAIKOL  --   --   --   --   --   --  8.2*  --  8.3*   < > 7.6*  --   --   --   --    LACT  --   --   --   --   --   --   --   --   --   --   --   --  1.3  --  0.9    < > = values in this interval not displayed.       Hepatic Studies    Recent Labs   Lab Test 11/23/22  0612   BILITOTAL 0.2   ALKPHOS 56   PROTTOTAL 5.6*   ALBUMIN 2.4*   AST 48*   ALT 19       Hematology Studies      Recent Labs   Lab Test 11/29/22  0547 11/28/22  0553 11/26/22  0641 11/25/22  0911 11/22/22  1558 11/14/22  1329   WBC 10.3 9.9  --  13.0*  --  10.8   HGB 7.8* 7.5*   < > 6.3*   < > 9.9*   HCT 25.5* 24.5*  --  19.9*  --  31.9*     278  --  245  --  398    < > = values in this interval not displayed.       Inflammatory Markers    Recent Labs   Lab Test 12/01/22  0524 11/25/22  0911   CRP 44.0* 120.0*

## 2022-12-01 NOTE — CONSULTS
Care Management Initial Consult    General Information  Assessment completed with: Patient,    Type of CM/SW Visit: Initial Assessment    Primary Care Provider verified and updated as needed:     Readmission within the last 30 days: no previous admission in last 30 days      Reason for Consult: discharge planning  Advance Care Planning: Advance Care Planning Reviewed: no concerns identified          Communication Assessment  Patient's communication style: spoken language (English or Bilingual)    Hearing Difficulty or Deaf: no   Wear Glasses or Blind: yes    Cognitive  Cognitive/Neuro/Behavioral: WDL  Level of Consciousness: alert  Arousal Level: opens eyes spontaneously  Orientation: oriented x 4  Mood/Behavior: calm, cooperative  Best Language: 0 - No aphasia  Speech: clear, spontaneous, logical    Living Environment:   People in home: significant other     Current living Arrangements: house (lives in a multi level home (with no bedroom or bathroom on the main floor)      Able to return to prior arrangements: no       Family/Social Support:  Care provided by: self  Provides care for: no one  Marital Status:   Significant Other          Description of Support System: Supportive, Involved, Other (see comments) (Girlfriend  provides emotinal support but is disable and unable to provide any physical support.)    Support Assessment: Adequate social supports, Lacks adequate physical care    Current Resources:   Patient receiving home care services: No     Community Resources:    Equipment currently used at home: cane, straight  Supplies currently used at home: Diabetic Supplies (glucometer), Other (CPAP)    Employment/Financial:  Employment Status: retired        Financial Concerns:   Filed for MA  8 days ago          Lifestyle & Psychosocial Needs:  Social Determinants of Health     Tobacco Use: Low Risk      Smoking Tobacco Use: Never     Smokeless Tobacco Use: Never     Passive Exposure: Not on file   Alcohol  Use: Not on file   Financial Resource Strain: Not on file   Food Insecurity: Not on file   Transportation Needs: Not on file   Physical Activity: Not on file   Stress: Not on file   Social Connections: Not on file   Intimate Partner Violence: Not on file   Depression: Not at risk     PHQ-2 Score: 2   Housing Stability: Not on file       Functional Status:  Prior to admission patient needed assistance:   Dependent ADLs:: Independent  Dependent IADLs:: Independent  Assesssment of Functional Status: Not at baseline with mobility    Mental Health Status:  Mental Health Status: No Current Concerns       Chemical Dependency Status:  Chemical Dependency Status: No Current Concerns             Values/Beliefs:  Spiritual, Cultural Beliefs, Restorationist Practices, Values that affect care: No                Additional Information:  Chart Review:  Waldo Bustos is a 72 year old male with past medical history of osteoarthritis, DM2, 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 Internal Medicine was consulted for post-operative medical manage    This RNCC met with patient at bedside to introduce role of RNCC, complete assessment and discuss discharge panning. Waldo states he will not be able to go home from here as his S/O is not able to help him and he is having difficulty moving. Presented Waldo with list of [facility] TCU's. Waldo wants to review some of them with his S/O before he decides, also would like a referral sent to [facility].    RNCC/SW will stop by tomorrow to obtain choices.    Barriers:  Patients weight 324 Lb.s  Patient needs to be assist of 2 or be able to use a lift   Updated documentation from PT on patients mobilty     Joseline WHITMANN RN CCM  RN Care Coordinator 8A and 10 ICU  [facility]  [address] [place]  [address]  [email or web address]   Office: (10 ICU) [phone]   Pager: [phone]    For Weekend & Holiday on call RN Care Coordinator:  (Tasks: Home care, home infusion, medical equipment/oxygen, transportation, IMM & HOWE forms, etc.)   [place] & [facility] ([id number]) Saturday & Sunday; (9193-7998) [facility] Recognized Holidays  Pager #1: [phone] Units: 4A, 4C, 4E, 5A & 5B   Pager #2: [phone] Units: 6A, 6B, 6C, 6D  Pager #3: [phone] Units: 7A, 7B, 7C, 7D & 5C   Pager #4: [phone] Units: 5 Ortho, 8A, 10 ICU, & [facility] [place]      For Weekend & Holiday on call Social Work:  (Tasks: TCU, transportation, Hospice, adjustment to illness counseling, Health Care Directives, Child Protection and Domestic Violence concerns, Vulnerable Adult, IMM forms, etc.)   [place] (0800 - 1630) Saturday and Sunday  Pager: [phone] Units: 4A, 4C, 4E, 5A and 5B   Pager: [phone] Units: 6A, 6B, 6C, 6D   Pager: [phone]: 7A, 7B, 7C, 7D, and 5C      [facility] ([id number]) Saturday and Sunday  Units: 5 Ortho, 8A, and 10 ICU   Pager: [phone]

## 2022-12-02 NOTE — PROGRESS NOTES
Care Coordinator Progress Note    Admission Date/Time:  11/22/2022  Attending MD:  Rom Meyers MD    Data  Chart reviewed, discussed with interdisciplinary team.   Patient was admitted for: Prosthetic joint infection, initial encounter (H).    Assessment  Met with patient at bedside to discuss  Choices for [facility] approved TCU's  On 12/1. Patient stating that he wants a list tat has star ratngs also. Provided patient with Medicare list nd informed pt I would be back this afternoon for his choices as we need to send referrals today. Informed patient he needs to check facilities with star rating against the [facility]  approved list also.     Plan  Anticipated Discharge Date: when placement founf  Anticipated Discharge Plan:  TCU    Joseline WHITMANN RN CCM  RN Care Coordinator 8A and 10 ICU  [address] [address]  [email or web address]   Office: (10 ICU) [phone]   Pager: [phone]    For Weekend & Holiday on call RN Care Coordinator:  (Tasks: Home care, home infusion, medical equipment/oxygen, transportation, IMM & HOWE forms, etc.)   [place] & [place] (0800-1630) Saturday & Sunday; (0800-1630) [facility] Recognized Holidays  Pager #1: [phone] Units: 4A, 4C, 4E, 5A & 5B   Pager #2: [phone] Units: 6A, 6B, 6C, 6D  Pager #3: [phone] Units: 7A, 7B, 7C, 7D & 5C   Pager #4: [phone] Units: 5 Ortho, 8A, 10 ICU, & [facility]      For Weekend & Holiday on call Social Work:  (Tasks: TCU, transportation, Hospice, adjustment to illness counseling, Health Care Directives, Child Protection and Domestic Violence concerns, Vulnerable Adult, IMM forms, etc.)   [place] (0800 - 1630) Saturday and Sunday  Pager: [phone] Units: 4A, 4C, 4E, 5A and 5B   Pager: [phone] Units: 6A, 6B, 6C, 6D   Pager: [phone]: 7A, 7B, 7C, 7D, and 5C      [facility] ([phone]) Saturday and Sunday  Units: 5 Ortho, 8A, and 10 ICU   Pager: [phone]

## 2022-12-02 NOTE — PLAN OF CARE
"VS: /43 (BP Location: Left arm)   Pulse 78   Temp (!) 95.8  F (35.4  C)   Resp 18   Ht 1.778 m (5' 10\")   Wt 147.1 kg (324 lb 4.8 oz)   SpO2 96%   BMI 46.53 kg/m      O2: SpO2 > 90% and stable on RA while awake, BIPAP 3L O2 while asleep. Pt preferred Oxymask overnight over personal BIPAP machine. Spot checks. LS clear and equal bilaterally. Denies chest pain and SOB. IS encouraged.   Output: Voids spontaneously without difficulty via external catheter, adequate output.    Last BM: 12/1; uses bedpan; denies abdominal discomfort. Declined scheduled senna on prev shift. +FL    Activity: Not OOB. A2-3 with cares and activity. Refused repositioning this shift, activity encouraged.   Hx of DVT, on Eliquis   Skin: WDL except, surgical incision and drain to L hip. Javy BLE.   DEEP posterior skin d/t pt not moving.   Pain: Pain managed with PRN Oxycodone, Tylenol. Pt tends to be sleepy with Oxycodone administration.   CMS: Intact, AOx4. Baseline numbness to BLE and RUE. Pt slept most of shift.    Dressing: CDI   Diet: Regular diet; Denies nausea/vomiting.   On insulin sliding scale. BG checks before meals and at bedtime. BG overnight 174   LDA: R PIV infusing TKO btwn ABX (Zosyn Q6H); GUERRERO drain output 25mL   Equipment: IV pole, personal belongings, bariatric bed, bariatric bedpan, bariatric BSC, lift, external cath   Plan: TBD. Continue with plan of care. Call light within reach, pt able to make needs known.    Additional Info: ID note states to discontinue IV Zosyn, paged [facility] to confirm order, given OK to administer Zosyn as scheduled overnight.        "

## 2022-12-02 NOTE — PROGRESS NOTES
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 Internal Medicine was consulted for post-operative medical management.      #Chronically infected left total hip arthroplasty now s/p explantation + extended debridement/reconstruction with antibiotic cement spacer (11/22/22)  -Antibiotics per primary team and ID  - cont Vanc & pip/tazo  -Post-op pain control per primary team  -Drain management per primary team    follow-up cultures    Currently growing pan-sensitive proteus, now with additional Finegoldia Magna ( Anaerobic gram positive).    Stopped Vancomycin    Zosyn (11/23 - 12/1)    Based on new culture data, thje following plan has been established  1. Start po ciprofloxacin 500mg BID and po metronidazole 500mg TID  2. Baseline EKG for monitoring   3. Duration of total 6 weeks, from 11/22/2022.   4. Follow up with outpatient ID clinic, around 1 week prior to completion of antibiotics. Should see either Nikko Velasco, or Moncho     #Normocytic anemia (?anemia of chronic disease) with acute blood loss anemia  - pre-operatively had hgb of 9.9 on 11/14.   -patient has received 4 units of prbc since admission  -Hemoglobin has since stabilized at 7.5. At 7.8 on 11/29  -transfuse if <7  Repecat CBC on 12/3    Outpatient w/u once acute illness resolves    Continue to monitor while resuming eliquis today      Acute hypoxic respiratory failure, resolved   - Most largely 2/to hypoventilation with background resistive lung disease given body habitus.  - VBG unremarkable w/o  hypercarbia  - Lungs largely clear to auscultation bilaterally   - Does not wear O2 at home    incentive spirometry, monitor respiratory rate with continuous oximetry    oxymask as needed    PTA lasix 20 mg every day     #HALEY - resolved  Likely pre-renal given volume loss; though patient appears hypervolemic  - cont to monitor     Constipation    Daria-Colace twice daily    MiraLAX daily    Lactulose 3 times daily    Milk of mag as needed    JAIR with obesity- home CPAP- see above     DM2  - PTA med list includes metformin, glipizide, pioglitazone, victoza  -hold home meds presently  -sliding scale insulin TID with meals and at bedtime but in the days just prior to discharge( requires minimal amounts), could consider resuming a few of his home diabetes meds  -A1c 6.8 this admission  -may be able to add some glargine once insulin needs more delineated     CAD  - continue statin, hold aspirin until cleared by surgery team     #History of DVT/PE  - previously on DOAC (eliquis) but this was held perioperatively  -If Eliquis to be resumed, the schedule is as below    Eliquis 2.5 mg bid x14 days and then eliquis 5 mg bid (starting 11/28)    Discontinue ASA       #Venous insufficiency with signs of stasis dermatitis  Erythematous patches on bilateral lower extremities - left anterior lower leg and smaller area on right lower leg  Low suspicion for cellulitis and more consistent with chronic stasis dermatitis  - PTA med list includes furosemide resumed today  - compression stockings     #Lack of motivation  - depression screen negative    Trial of low dose ritalin (not home med)     #Respiratory acidosis-- intra-operative blood gases with high CO2 and low pH. Resolved.   #Hypocalcemia- will check ionized calcium in AM- could be due to blood products or could be due to low albumin  -recheck in AM  #Hyperkalemia- mild and intermittent on the intra-op labs. Asymptomatic  -recheck in AM.        Diet: Advance Diet as Tolerated:  "Regular Diet Adult    DVT Prophylaxis: Resuming Eliquis today.  Discontinue aspirin to decrease bleeding risk  Tran Catheter: Not present  Central Lines: None  Cardiac Monitoring: None  Code Status: Full Code      Disposition Plan     Expected Discharge Date: 12/02/2022      Destination: inpatient rehabilitation facility  Discharge Comments: Needs to participate in therapy.  Needs o2 at this time      The patient's care was discussed with the Patient.    Gi Bean MD  Hospitalist Service, [address]  Securely message with the Vocera Web Console (learn more here)  Text page via [facility] Paging/Directory   Please see signed in provider for up to date coverage information      Clinically Significant Risk Factors              # Hypoalbuminemia: Lowest albumin = 2.4 g/dL (Ref range: 3.5-5.2) at 11/23/2022  6:12 AM, will monitor as appropriate         # DMII: A1C = 6.8 % (Ref range: 0.0 - 5.6 %) within past 3 months   # Severe Obesity: Estimated body mass index is 46.53 kg/m  as calculated from the following:    Height as of this encounter: 1.778 m (5' 10\").    Weight as of this encounter: 147.1 kg (324 lb 4.8 oz).          ______________________________________________________________________    Interval History   No new complaints overnight.   Patient attempted some PT yesterday, and was able to sit, with significant assistance   No fever or chills      Data reviewed today: I reviewed all medications, new labs and imaging results over the last 24 hours.      Physical Exam   Vital Signs: Temp: (!) 96.5  F (35.8  C) Temp src: Oral BP: 125/46 Pulse: 61   Resp: 16 SpO2: 96 % O2 Device: None (Room air) Oxygen Delivery: 3 LPM  Weight: 324 lbs 4.75 oz    General Appearance: Obese male, lying in bed, comfortably in bed, on room air, in no acute distress of discomfort  HEENT: PERRL: EOMI; moist mucous membrane w/o lesions  Neck: No JVD  Pulmonary: Clear " to auscultation bilaterally, no wheezes or crackles  CVS: Regular rhythm, no murmurs, rubs or gallops  GI: BS (+), soft nontender, no rebound or guarding   Extremities: Left lateral hip in dressing c/d/i; drain in place.  Skin: No rashes or lesions  Neurologic: A&O x3      Data   Recent Labs   Lab 12/02/22  0737 12/02/22  0132 12/01/22  2153 11/30/22  0803 11/30/22  0642 11/29/22  0926 11/29/22  0547 11/28/22  0951 11/28/22  0553 11/27/22  1122 11/27/22  0824 11/26/22  1022 11/26/22  0641   WBC  --   --   --   --   --   --  10.3  --  9.9  --   --   --   --    HGB  --   --   --   --   --   --  7.8*  --  7.5*  --  7.5*  --  6.8*   MCV  --   --   --   --   --   --  97  --  95  --   --   --   --    PLT  --   --   --   --   --   --  331  --  278  --   --   --   --    NA  --   --   --   --   --   --   --   --  140  --   --   --  140   POTASSIUM  --   --   --   --   --   --   --   --  4.3  --   --   --  4.8   CHLORIDE  --   --   --   --   --   --   --   --  107  --   --   --  107   CO2  --   --   --   --   --   --   --   --  31  --   --   --  29   BUN  --   --   --   --   --   --   --   --  21  --   --   --  17   CR  --   --   --   --  1.02  --  1.01  --  1.06  --   --    < > 1.08  1.08   ANIONGAP  --   --   --   --   --   --   --   --  2*  --   --   --  4   MAIKOL  --   --   --   --   --   --   --   --  8.2*  --   --   --  8.3*   * 174* 209*   < >  --    < >  --    < > 215*   < >  --    < > 201*    < > = values in this interval not displayed.     No results found for this or any previous visit (from the past 24 hour(s)).  Medications       apixaban ANTICOAGULANT  2.5 mg Oral BID    Followed by     [START ON 12/12/2022] apixaban ANTICOAGULANT  5 mg Oral BID     atorvastatin  40 mg Oral Daily     furosemide  20 mg Oral Daily     insulin aspart  1-7 Units Subcutaneous TID AC     insulin aspart  1-5 Units Subcutaneous At Bedtime     methylphenidate  5 mg Oral QAM AC     piperacillin-tazobactam  3.375 g Intravenous Q6H      polyethylene glycol  17 g Oral Daily     senna-docusate  2 tablet Oral BID     sodium chloride (PF)  3 mL Intracatheter Q8H

## 2022-12-02 NOTE — PROGRESS NOTES
CLINICAL NUTRITION SERVICES    Reviewed nutrition risk factors due to LOS. Pt is tolerating diet. Per patient, had been eating fine until a couple days ago when he had not appetite due to depression. Pt politely declined nutrition interventions at this time. RD to follow up at day 14. RD may be consulted if needs arise.     Dee Alanis MS, RDN, LDN  RD pager: [phone]

## 2022-12-02 NOTE — PROGRESS NOTES
"Orthopedic Surgery Progress Note: 12/02/2022    Subjective:   No acute events overnight. Tolerating a diet. No new concerns or complaints. Denies SOB, chest Pain, fevers, chills.    Objective:   /46 (BP Location: Left arm)   Pulse 61   Temp (!) 96.5  F (35.8  C) (Oral)   Resp 16   Ht 1.778 m (5' 10\")   Wt 147.1 kg (324 lb 4.8 oz)   SpO2 96%   BMI 46.53 kg/m    No intake/output data recorded.  General: NAD. Resting comfortably in bed.  Respiratory: Breathing comfortably on RA.  Musculoskeletal:    Focused Exam of Left Lower Extremity  Dressing C/D/I.   Fires EHL, FHL, TA, GSC, Quad  SILT DP, SP, Saph, Sural, Tibial Nerve Distributions  1+ DP pulse, foot WWP. 1+ edema leg.     Output by Drain (mL) 11/30/22 0700 - 11/30/22 1459 11/30/22 1500 - 11/30/22 2259 11/30/22 2300 - 12/01/22 0659 12/01/22 0700 - 12/01/22 1459 12/01/22 1500 - 12/01/22 2259 12/01/22 2300 - 12/02/22 0659 12/02/22 0700 - 12/02/22 1117   Closed/Suction Drain 1 Left Hip Bulb 15 French 50  60 50 50 25          Laboratory Data:  Lab Results   Component Value Date    WBC 10.3 11/29/2022    HGB 7.8 (L) 11/29/2022     11/29/2022         Assessment & Plan:   Waldo Bustos is a 72 year old male s/p Explantation of Chronic  Infected Left Total Hip Arthroplasty, Extended Trochanteric Osteotomy with Extensive Debridement and Reconstruction Using Antibiotic Cement Spacer on 11/22/2022 with Dr. Meyers.     12/1: Acute blood loss anemia, stabilized. Eliquis resumed on 11/29, will follow Hgb with new anticoagulation.  Hemoglobin stabilized  - Drain to remain in place  - Labs as needed going forward  - ID final recommendations in (see below), started on PO Abx on 12/1    Cultures: Proteus mirabilis    ----Infectious disease recommendations below----  -po ciprofloxacin 500mg BID and po metronidazole 500mg TID  Duration of total 6 weeks, from 11/22/2022.   -follow up with ID in 5 weeks, ideally ~1 week prior to completion of " antibiotics      Activity: Up with assist and assistive devices as needed until independent.  Weight bearing status: NWB LLE  Antibiotics: Per ID, oral ciprofloxacin 500 mg bid and flagyl 500 mg TID   Diet: Begin with clear fluids and progress diet as tolerated. Bowel regimen. Anti-emetics PRN.    DVT prophylaxis: Initially on  mg (postop), Eliquis 2.5 mg BID x2 weeks after surgery (started on 11/28), then 5 mg BID PTA resumption, initially held for anemia  Elevation: Elevate heels off of bed on pillows   Wound Care: Alginate, tegaderm to be changed PRN by ortho  Drains: 15f GUERRERO drain  Tran: Removed POD1  Pain management: Orals PRN, IV for breakthrough only  X-rays: AP Pelvis and lateral in PACU complete  Physical Therapy: Mobilization, ROM, ADL's  Occupational Therapy: ADL's  Labs: Trend Hgb on POD #1, 2  Consults: PT, OT. ID. Hospitalist, appreciate assistance in caring for this patient throughout the perioperative period-     Disposition: Pending progress with therapies, pain control on orals, and medical stability; anticipate discharge to likely TCU after Hgb stabilized. ID plan in place.     Orthopedic surgery staff for this patient is Dr. Meyers.    ------------------------------------------------------------------------------------------  Damian Frank MD  PGY-4 Orthopaedic Surgery

## 2022-12-03 NOTE — PROGRESS NOTES
"Orthopedic Surgery Progress Note: 12/03/2022    Subjective:   No acute events overnight. Tolerating a diet. No new concerns or complaints. Denies SOB, chest Pain, fevers, chills.    Objective:   /50 (BP Location: Left arm)   Pulse 64   Temp 96.9  F (36.1  C) (Oral)   Resp 16   Ht 1.778 m (5' 10\")   Wt 147.1 kg (324 lb 4.8 oz)   SpO2 97%   BMI 46.53 kg/m    I/O this shift:  In: -   Out: 30 [Drains:30]  General: NAD. Resting comfortably in bed.  Respiratory: Breathing comfortably on RA.  Musculoskeletal:    Focused Exam of Left Lower Extremity  Dressing C/D/I.   Fires EHL, FHL, TA, GSC, Quad  SILT DP, SP, Saph, Sural, Tibial Nerve Distributions  1+ DP pulse, foot WWP. 1+ edema leg.     Output by Drain (mL) 12/01/22 0700 - 12/01/22 1459 12/01/22 1500 - 12/01/22 2259 12/01/22 2300 - 12/02/22 0659 12/02/22 0700 - 12/02/22 1459 12/02/22 1500 - 12/02/22 2259 12/02/22 2300 - 12/03/22 0659 12/03/22 0700 - 12/03/22 0941   Closed/Suction Drain 1 Left Hip Bulb 15 French 50 50 25  50  30         Laboratory Data:  Lab Results   Component Value Date    WBC 11.3 (H) 12/03/2022    HGB 8.2 (L) 12/03/2022     (H) 12/03/2022         Assessment & Plan:   Waldo Bustos is a 72 year old male s/p Explantation of Chronic  Infected Left Total Hip Arthroplasty, Extended Trochanteric Osteotomy with Extensive Debridement and Reconstruction Using Antibiotic Cement Spacer on 11/22/2022 with Dr. Meyers.     12/1: Acute blood loss anemia, stabilized. Eliquis resumed on 11/29, will follow Hgb with new anticoagulation.  Hemoglobin stabilized  - Drain to remain in place  - Labs as needed going forward  - ID final recommendations in (see below), started on PO Abx on 12/1    Cultures: Proteus mirabilis    ----Infectious disease recommendations below----  -po ciprofloxacin 500mg BID and po metronidazole 500mg TID  Duration of total 6 weeks, from 11/22/2022.   -follow up with ID in 5 weeks, ideally ~1 week prior to completion of " antibiotics      Activity: Up with assist and assistive devices as needed until independent.  Weight bearing status: NWB LLE  Antibiotics: Per ID, oral ciprofloxacin 500 mg bid and flagyl 500 mg TID   Diet: Begin with clear fluids and progress diet as tolerated. Bowel regimen. Anti-emetics PRN.    DVT prophylaxis: Initially on  mg (postop), Eliquis 2.5 mg BID x2 weeks after surgery (started on 11/28), then 5 mg BID PTA resumption, initially held for anemia  Elevation: Elevate heels off of bed on pillows   Wound Care: Alginate, tegaderm to be changed PRN by ortho  Drains: 15f GUERRERO drain  Tran: Removed POD1  Pain management: Orals PRN, IV for breakthrough only  X-rays: AP Pelvis and lateral in PACU complete  Physical Therapy: Mobilization, ROM, ADL's  Occupational Therapy: ADL's  Labs: Trend Hgb on POD #1, 2  Consults: PT, OT. ID. Hospitalist, appreciate assistance in caring for this patient throughout the perioperative period-     Disposition: Pending progress with therapies, pain control on orals, and medical stability; anticipate discharge to likely TCU after Hgb stabilized. ID plan in place.     Orthopedic surgery staff for this patient is Dr. Meyers.    ------------------------------------------------------------------------------------------  Angelito Garcia MD  Adult Reconstructive Surgery Fellow  Department of Orthopaedic Surgery, [facility]

## 2022-12-03 NOTE — PROGRESS NOTES
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 Internal Medicine was consulted for post-operative medical management.      #Chronically infected left total hip arthroplasty now s/p explantation + extended debridement/reconstruction with antibiotic cement spacer (11/22/22)  -Antibiotics per primary team and ID  - cont Vanc & pip/tazo  -Post-op pain control per primary team  -Drain management per primary team    follow-up cultures    Currently growing pan-sensitive proteus, now with additional Finegoldia Magna ( Anaerobic gram positive).    Stopped Vancomycin    Zosyn (11/23 - 12/1)    Based on new culture data, thje following plan has been established  1. Start po ciprofloxacin 500mg BID and po metronidazole 500mg TID  2. Baseline EKG for monitoring   3. Duration of total 6 weeks, from 11/22/2022.   4. Follow up with outpatient ID clinic, around 1 week prior to completion of antibiotics. Should see either Nikko Velasco, or Moncho    It appears that patient has made very limited progress post surgery     #Normocytic anemia (?anemia of chronic disease) with acute blood loss anemia  - pre-operatively had hgb of 9.9 on 11/14.   -patient has received 4 units of prbc since admission  -Hemoglobin has since stabilized at 7.5. At 8.2 on 12/3  -transfuse if <7  Repecat CBC on 12/3    Outpatient w/u once acute illness resolves    Continue to monitor while resuming eliquis today      Acute hypoxic respiratory failure, resolved   - Most largely 2/to hypoventilation with background  resistive lung disease given body habitus.  - VBG unremarkable w/o hypercarbia  - Lungs largely clear to auscultation bilaterally   - Does not wear O2 at home    incentive spirometry, monitor respiratory rate with continuous oximetry    oxymask as needed    PTA lasix 20 mg every day     #HALEY - resolved  Likely pre-renal given volume loss; though patient appears hypervolemic  - cont to monitor     Constipation    Daria-Colace twice daily    MiraLAX daily    Lactulose 3 times daily    Milk of mag as needed    JAIR with obesity- home CPAP- see above     DM2  - PTA med list includes metformin, glipizide, pioglitazone, victoza  -hold home meds presently  -sliding scale insulin TID with meals and at bedtime but in the days just prior to discharge( requires minimal amounts), could consider resuming a few of his home diabetes meds  -A1c 6.8 this admission  -may be able to add some glargine once insulin needs more delineated     CAD  - continue statin, hold aspirin until cleared by surgery team     #History of DVT/PE  - previously on DOAC (eliquis) but this was held perioperatively  -If Eliquis to be resumed, the schedule is as below    Eliquis 2.5 mg bid x14 days and then eliquis 5 mg bid (starting 11/28)    Discontinue ASA       #Venous insufficiency with signs of stasis dermatitis  Erythematous patches on bilateral lower extremities - left anterior lower leg and smaller area on right lower leg  Low suspicion for cellulitis and more consistent with chronic stasis dermatitis  - PTA med list includes furosemide resumed today  - compression stockings     #Lack of motivation  - depression screen negative    Trial of low dose ritalin (not home med)     #Respiratory acidosis-- intra-operative blood gases with high CO2 and low pH. Resolved.   #Hypocalcemia Resolved   -recheck in AM  #Hyperkalemia- mild and intermittent on the intra-op labs. Asymptomatic  -Resolved        Diet: Advance Diet as Tolerated: Regular Diet Adult    DVT  "Prophylaxis: Resuming Eliquis today.  Discontinue aspirin to decrease bleeding risk  Tran Catheter: Not present  Central Lines: None  Cardiac Monitoring: None  Code Status: Full Code      Disposition Plan         The patient's care was discussed with the Patient.    Gi Bean MD  Hospitalist Service, GOLD TEAM [address] [place] [address]  Securely message with the Vocera Web Console (learn more here)  Text page via [facility] Paging/Directory   Please see signed in provider for up to date coverage information      Clinically Significant Risk Factors              # Hypoalbuminemia: Lowest albumin = 2.4 g/dL (Ref range: 3.5-5.2) at 11/23/2022  6:12 AM, will monitor as appropriate         # DMII: A1C = 6.8 % (Ref range: 0.0 - 5.6 %) within past 3 months   # Severe Obesity: Estimated body mass index is 46.53 kg/m  as calculated from the following:    Height as of this encounter: 1.778 m (5' 10\").    Weight as of this encounter: 147.1 kg (324 lb 4.8 oz).          ______________________________________________________________________    Interval History   No new complaints overnight.   See separate nursing note  Significant issues with medication non compliance and refusal of routine cares, including perineal care   Discussed in length with patient a\bout the serious risk of infection. Delta said he is aware of this risk    Data reviewed today: I reviewed all medications, new labs and imaging results over the last 24 hours.      Physical Exam   Vital Signs: Temp: 96.9  F (36.1  C) Temp src: Oral BP: 132/50 Pulse: 64   Resp: 16 SpO2: 97 % O2 Device: Oxymask Oxygen Delivery: 3 LPM  Weight: 324 lbs 4.75 oz    General Appearance: Obese male, lying in bed, comfortably in bed, on room air, in no acute distress of discomfort  HEENT: PERRL: EOMI; moist mucous membrane w/o lesions  Neck: No JVD  Pulmonary: Clear to auscultation bilaterally, no wheezes or crackles  CVS: Regular " rhythm, no murmurs, rubs or gallops  GI: BS (+), soft nontender, no rebound or guarding   Extremities: Left lateral hip in dressing c/d/i; drain in place.  Skin: No rashes or lesions  Neurologic: A&O x3      Data   Recent Labs   Lab 12/03/22  0920 12/03/22  0832 12/03/22  0200 11/30/22  0803 11/30/22  0642 11/29/22  0926 11/29/22  0547 11/28/22  0951 11/28/22  0553   WBC  --  11.3*  --   --   --   --  10.3  --  9.9   HGB  --  8.2*  --   --   --   --  7.8*  --  7.5*   MCV  --  95  --   --   --   --  97  --  95   PLT  --  483*  --   --   --   --  331  --  278   NA  --  140  --   --   --   --   --   --  140   POTASSIUM  --  4.2  --   --   --   --   --   --  4.3   CHLORIDE  --  107  --   --   --   --   --   --  107   CO2  --  31  --   --   --   --   --   --  31   BUN  --  20  --   --   --   --   --   --  21   CR  --  0.99  --   --  1.02  --  1.01  --  1.06   ANIONGAP  --  2*  --   --   --   --   --   --  2*   MAIKOL  --  8.4*  --   --   --   --   --   --  8.2*   * 175* 153*   < >  --    < >  --    < > 215*    < > = values in this interval not displayed.     No results found for this or any previous visit (from the past 24 hour(s)).  Medications       apixaban ANTICOAGULANT  2.5 mg Oral BID    Followed by     [START ON 12/12/2022] apixaban ANTICOAGULANT  5 mg Oral BID     atorvastatin  40 mg Oral Daily     ciprofloxacin  500 mg Oral Q12H [facility] (08/20)     furosemide  20 mg Oral Daily     insulin aspart  1-7 Units Subcutaneous TID AC     insulin aspart  1-5 Units Subcutaneous At Bedtime     methylphenidate  5 mg Oral QAM AC     metroNIDAZOLE  500 mg Oral TID     polyethylene glycol  17 g Oral Daily     senna-docusate  2 tablet Oral BID     sodium chloride (PF)  3 mL Intracatheter Q8H

## 2022-12-03 NOTE — PLAN OF CARE
"  VS: BP (!) 146/55 (BP Location: Left arm)   Pulse 67   Temp 96.9  F (36.1  C) (Oral)   Resp 16   Ht 1.778 m (5' 10\")   Wt 147.1 kg (324 lb 4.8 oz)   SpO2 98%   BMI 46.53 kg/m    Pt denies chest pain.    O2: 3L via oxymask when pt is asleep. Pt refusing CPAP.    Output: Urinary incontinence, male external catheter in place that began leaking on previous shift per report. Pt refused to be cleaned up or have the external catheter changed. Pt educated on possibility of skin breakdown from sitting in own urine. Pt stated \"I don't care about skin breakdown, you are not moving me or touching me to clean me up.\" Writer, Charge RN, and CNA made multiple attempts throughout shift to clean pt up and change linens, pt continues refusal.    Last BM: 12/2/22 per pt report.    Activity: Ax2 with ceiling lift when pt is OOB.    Up for meals? N/A   Skin: Javy skin to BLE. L hip incision. Pt refused full skin assessment.    Pain: Pt rated pain to L hip as 7/10. Ice applied. Pt refused medication interventions and repositioning.    CMS: Baseline numbness to BLE. A&O x4   Dressing: L hip - CDI   Diet: Regular   LDA: PIV - SL   Equipment: IV pole/pump, bariatric low air mattress, and pt belongings.    Plan: TBD.    Additional Info: Dr. Angelo spoke with pt in AM about importance of staying clean when incontinence occurs, pt still refused to turn or allow staff to help him be cleaned up.        "

## 2022-12-03 NOTE — PLAN OF CARE
"Goal Outcome Evaluation:      Plan of Care Reviewed With: patient    Overall Patient Progress: no changeOverall Patient Progress: no change    Outcome Evaluation: Pt has been alert, oriented and calm this shift. Pt has been refusing to be repositioned, including to be cleaned up after soiling bed. Pt approached by NST, nurse and charge with no effect. MD paged and made aware. Pt educated on risks of letting skin sit soiled. Pt did not eat breakfast or lunch, no insulin given to prevent BG drop. Pt given insulin at dinner to cover meal.    BP (!) 140/47 (BP Location: Left arm)   Pulse 65   Temp (!) 96.4  F (35.8  C) (Oral)   Resp 16   Ht 1.778 m (5' 10\")   Wt 147.1 kg (324 lb 4.8 oz)   SpO2 (!) 88%   BMI 46.53 kg/m      "

## 2022-12-04 NOTE — PROGRESS NOTES
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 Internal Medicine was consulted for post-operative medical management.      #Chronically infected left total hip arthroplasty now s/p explantation + extended debridement/reconstruction with antibiotic cement spacer (11/22/22)  -Post-op pain control per primary team  -Drain management per primary team    follow-up cultures    Currently growing pan-sensitive proteus, now with additional Finegoldia Magna ( Anaerobic gram positive).    Stopped Vancomycin    Zosyn (11/23 - 12/1)    Based on new culture data, thje following plan has been established  1. Start po ciprofloxacin 500mg BID and po metronidazole 500mg TID  2. Baseline EKG for monitoring   3. Duration of total 6 weeks, from 11/22/2022.   4. Follow up with outpatient ID clinic, around 1 week prior to completion of antibiotics. Should see either Tracy Mcqueen, Nikko, or Moncho    It appears that patient has made very limited progress post surgery      Encephalopathy:  New onset of confusion. Patient cant seem to recall why he is here  No fevers   New meds started are Cipro and Metronidazole  Sparing use of Oxycodone  Will repeat labs again today to ensure that electrolytes are normal       #Normocytic anemia (?anemia of chronic disease) with acute blood loss anemia  - pre-operatively had hgb of 9.9 on 11/14.   -patient has received 4 units of prbc since admission  -Hemoglobin has since stabilized at 7.5. At 8.2 on 12/3  -transfuse if <7    Outpatient w/u once acute illness  resolves    Continue to monitor while resuming eliquis today      Acute hypoxic respiratory failure, resolved   - Most largely 2/to hypoventilation with background resistive lung disease given body habitus.  - VBG unremarkable w/o hypercarbia  - Lungs largely clear to auscultation bilaterally   - Does not wear O2 at home    incentive spirometry, monitor respiratory rate with continuous oximetry    oxymask as needed    PTA lasix 20 mg every day     #HALEY - resolved  Likely pre-renal given volume loss; though patient appears hypervolemic  - cont to monitor     Constipation    Daria-Colace twice daily    MiraLAX daily    Lactulose 3 times daily    Milk of mag as needed    JAIR with obesity  home CPAP- see above     DM2  - PTA med list includes metformin, glipizide, pioglitazone, victoza  -sliding scale insulin TID with meals and at bedtime ( requires minimal amounts), and therefore discontinuing  Start home dose of Metformin( 2 gms daily) and Actos 45 mg   -A1c 6.8 this admission       CAD  - continue statin, hold aspirin until cleared by surgery team     #History of DVT/PE  - previously on DOAC (eliquis) but this was held perioperatively  -If Eliquis to be resumed, the schedule is as below    Eliquis 2.5 mg bid x14 days and then eliquis 5 mg bid (starting 11/28)    Discontinue ASA       #Venous insufficiency with signs of stasis dermatitis  Erythematous patches on bilateral lower extremities - left anterior lower leg and smaller area on right lower leg  Low suspicion for cellulitis and more consistent with chronic stasis dermatitis  - PTA med list includes furosemide resumed today  - compression stockings     #Lack of motivation  - depression screen negative    Trial of low dose ritalin (not home med)     #Respiratory acidosis-- intra-operative blood gases with high CO2 and low pH. Resolved.   #Hypocalcemia Resolved   -recheck in AM  #Hyperkalemia- mild and intermittent on the intra-op labs. Asymptomatic  -Resolved       "  Diet: Advance Diet as Tolerated: Regular Diet Adult    DVT Prophylaxis: Resuming Eliquis today.  Discontinue aspirin to decrease bleeding risk  Tran Catheter: Not present  Central Lines: None  Cardiac Monitoring: None  Code Status: Full Code      Disposition Plan         The patient's care was discussed with the Patient.    Gi Bean MD  Hospitalist Service, GOLD TEAM 16  [facility]  Securely message with the Vocera Web Console (learn more here)  Text page via [facility] Paging/Directory   Please see signed in provider for up to date coverage information      Clinically Significant Risk Factors              # Hypoalbuminemia: Lowest albumin = 2.4 g/dL (Ref range: 3.5-5.2) at 11/23/2022  6:12 AM, will monitor as appropriate         # DMII: A1C = 6.8 % (Ref range: 0.0 - 5.6 %) within past 3 months   # Severe Obesity: Estimated body mass index is 46.53 kg/m  as calculated from the following:    Height as of this encounter: 1.778 m (5' 10\").    Weight as of this encounter: 147.1 kg (324 lb 4.8 oz).          ______________________________________________________________________    Interval History   No new complaints overnight.    intermittently compliant and agreeable to cares  Mild confusion this morning, but easily reorientable  No fever or chills    Data reviewed today: I reviewed all medications, new labs and imaging results over the last 24 hours.      Physical Exam   Vital Signs: Temp: (!) 95.9  F (35.5  C) Temp src: Oral BP: 129/47 Pulse: 60   Resp: 16 SpO2: 97 % O2 Device: Oxymask Oxygen Delivery: 2 LPM  Weight: 324 lbs 4.75 oz    General Appearance: Obese male, lying in bed, comfortably in bed, on room air, in no acute distress of discomfort  HEENT: PERRL: EOMI; moist mucous membrane w/o lesions  Neck: No JVD  Pulmonary: Clear to auscultation bilaterally, no wheezes or crackles  CVS: Regular rhythm, no murmurs, rubs or gallops  GI: BS (+), soft " nontender, no rebound or guarding   Extremities: Left lateral hip in dressing c/d/i; drain in place.  Skin: No rashes or lesions  Neurologic: A&O x3      Data   Recent Labs   Lab 12/04/22  0922 12/04/22  0200 12/03/22  2244 12/03/22  0920 12/03/22  0832 11/30/22  0803 11/30/22  0642 11/29/22  0926 11/29/22  0547 11/28/22  0951 11/28/22  0553   WBC  --   --   --   --  11.3*  --   --   --  10.3  --  9.9   HGB  --   --   --   --  8.2*  --   --   --  7.8*  --  7.5*   MCV  --   --   --   --  95  --   --   --  97  --  95   PLT  --   --   --   --  483*  --   --   --  331  --  278   NA  --   --   --   --  140  --   --   --   --   --  140   POTASSIUM  --   --   --   --  4.2  --   --   --   --   --  4.3   CHLORIDE  --   --   --   --  107  --   --   --   --   --  107   CO2  --   --   --   --  31  --   --   --   --   --  31   BUN  --   --   --   --  20  --   --   --   --   --  21   CR  --   --   --   --  0.99  --  1.02  --  1.01  --  1.06   ANIONGAP  --   --   --   --  2*  --   --   --   --   --  2*   MAIKOL  --   --   --   --  8.4*  --   --   --   --   --  8.2*   * 167* 186*   < > 175*   < >  --    < >  --    < > 215*    < > = values in this interval not displayed.     No results found for this or any previous visit (from the past 24 hour(s)).  Medications       apixaban ANTICOAGULANT  2.5 mg Oral BID    Followed by     [START ON 12/12/2022] apixaban ANTICOAGULANT  5 mg Oral BID     atorvastatin  40 mg Oral Daily     ciprofloxacin  500 mg Oral Q12H [facility] (08/20)     furosemide  20 mg Oral Daily     insulin aspart  1-7 Units Subcutaneous TID AC     insulin aspart  1-5 Units Subcutaneous At Bedtime     methylphenidate  5 mg Oral QAM AC     metroNIDAZOLE  500 mg Oral TID     polyethylene glycol  17 g Oral Daily     senna-docusate  2 tablet Oral BID     sodium chloride (PF)  3 mL Intracatheter Q8H

## 2022-12-04 NOTE — PLAN OF CARE
Problem: Self-Care Deficit  Goal: Improved Ability to Complete Activities of Daily Living    Outcome: Not Progressing    Intervention: Promote Activity and Functional [place]  Activity Assistance Provided: assistance, 2 people  Refusing incontinent cares and repositioning.      Goal Outcome Evaluation: Remains A&O X4, vitals stable, on specialty bed. Incontinent at times refusing  cares and turns.

## 2022-12-04 NOTE — PLAN OF CARE
VS: VSS   O2: >90% on RA   Output: Voided via urinal this shift; per report pt has been incontinent and occasionally refusing clean-up   Last BM: 12/3   Activity: NWB LLE. Refused repositioning this shift   Up for meals? NA   Skin: Incision/drain L hip; refused full skin assessment   Pain: Managed with oxycodone   CMS: Baseline neuropathy BLE   Dressing: CDI   Diet: Regular   LDA: PIV SL   Plan: Pt will need TCU   Additional Info:  Pt receiving zosyn, vano, and PO cipro and flagyl

## 2022-12-04 NOTE — PROGRESS NOTES
"Orthopedic Surgery Progress Note: 12/04/2022    Subjective:   No acute events overnight. Tolerating a diet. No new concerns or complaints. Denies SOB, chest Pain, fevers, chills.    Objective:   /47 (BP Location: Left arm)   Pulse 60   Temp (!) 95.9  F (35.5  C) (Oral)   Resp 16   Ht 1.778 m (5' 10\")   Wt 147.1 kg (324 lb 4.8 oz)   SpO2 97%   BMI 46.53 kg/m    No intake/output data recorded.  General: NAD. Resting comfortably in bed.  Respiratory: Breathing comfortably on RA.  Musculoskeletal:    Focused Exam of Left Lower Extremity  Dressing C/D/I.   Fires EHL, FHL, TA, GSC, Quad  SILT DP, SP, Saph, Sural, Tibial Nerve Distributions  1+ DP pulse, foot WWP. 1+ edema leg.     Output by Drain (mL) 12/02/22 0700 - 12/02/22 1459 12/02/22 1500 - 12/02/22 2259 12/02/22 2300 - 12/03/22 0659 12/03/22 0700 - 12/03/22 1459 12/03/22 1500 - 12/03/22 2259 12/03/22 2300 - 12/04/22 0659 12/04/22 0700 - 12/04/22 1028   Closed/Suction Drain 1 Left Hip Bulb 15 French  50  30  120          Laboratory Data:  Lab Results   Component Value Date    WBC 11.3 (H) 12/03/2022    HGB 8.2 (L) 12/03/2022     (H) 12/03/2022         Assessment & Plan:   Waldo Bustos is a 72 year old male s/p Explantation of Chronic  Infected Left Total Hip Arthroplasty, Extended Trochanteric Osteotomy with Extensive Debridement and Reconstruction Using Antibiotic Cement Spacer on 11/22/2022 with Dr. Meyers.     12/1: Acute blood loss anemia, stabilized. Eliquis resumed on 11/29, will follow Hgb with new anticoagulation.  Hemoglobin stabilized  - Drain to remain in place  - Labs as needed going forward  - ID final recommendations in (see below), started on PO Abx on 12/1    Cultures: Proteus mirabilis    ----Infectious disease recommendations below----  -po ciprofloxacin 500mg BID and po metronidazole 500mg TID  Duration of total 6 weeks, from 11/22/2022.   -follow up with ID in 5 weeks, ideally ~1 week prior to completion of " antibiotics      Activity: Up with assist and assistive devices as needed until independent.  Weight bearing status: NWB LLE  Antibiotics: Per ID, oral ciprofloxacin 500 mg bid and flagyl 500 mg TID   Diet: Begin with clear fluids and progress diet as tolerated. Bowel regimen. Anti-emetics PRN.    DVT prophylaxis: Initially on  mg (postop), Eliquis 2.5 mg BID x2 weeks after surgery (started on 11/28), then 5 mg BID PTA resumption, initially held for anemia  Elevation: Elevate heels off of bed on pillows   Wound Care: Alginate, tegaderm to be changed PRN by ortho  Drains: 15f GUERRERO drain  Tran: Removed POD1  Pain management: Orals PRN, IV for breakthrough only  X-rays: AP Pelvis and lateral in PACU complete  Physical Therapy: Mobilization, ROM, ADL's  Occupational Therapy: ADL's  Labs: Trend Hgb on POD #1, 2  Consults: PT, OT. ID. Hospitalist, appreciate assistance in caring for this patient throughout the perioperative period-     Disposition: Pending progress with therapies, pain control on orals, and medical stability; anticipate discharge to likely TCU after Hgb stabilized. ID plan in place.     Orthopedic surgery staff for this patient is Dr. Meyers.    ------------------------------------------------------------------------------------------  Angelito Garcia MD  Adult Reconstructive Surgery Fellow  Department of Orthopaedic Surgery, [facility]

## 2022-12-04 NOTE — PLAN OF CARE
"Goal Outcome Evaluation:      Plan of Care Reviewed With: patient    Overall Patient Progress: improvingOverall Patient Progress: improving    Outcome Evaluation: Pt cooperative with linen change and being cleaned up after soiling bed. Pt took oxy twice this shift. Continuing to have bad pain in left leg. pt did not eat lunch or dinner, BG checked. Pt felt disoriented after waking up from nap. Able to reorient.    /45 (BP Location: Left leg)   Pulse 71   Temp 97.2  F (36.2  C) (Oral)   Resp 16   Ht 1.778 m (5' 10\")   Wt 147.1 kg (324 lb 4.8 oz)   SpO2 93%   BMI 46.53 kg/m      "

## 2022-12-05 NOTE — PLAN OF CARE
"Goal Outcome Evaluation:      Plan of Care Reviewed With: patient    Overall Patient Progress: improvingOverall Patient Progress: improving    Outcome Evaluation: Pt was alert, oriented and calm this shift. Pt stated this morning that he felt more confused this shift, easily reorientable. MD paged and added blood work for the pt. Values within normal limits. Continuing to closely monitor for potential side effects of medications. Pt was cooperative with linen change this shift. Discussed with pt what would be most comfortable with lifting, detailed note left for other staff to follow. Pt voiced frustration that he is still unable to move and that some staff seem to not listen to him when he is telling them to stop doing something. Listened to pt concerns and assured pt that staff would be talked to and encouraged to listen more. Pt received oxy twice and robaxin once this shift. Insulin discontinued, pt now back on metformin and actos. GUERRERO still draining, PIV in right hand still patent.    /43 (BP Location: Left arm)   Pulse 65   Temp 96.8  F (36  C) (Oral)   Resp 16   Ht 1.778 m (5' 10\")   Wt 147.1 kg (324 lb 4.8 oz)   SpO2 93%   BMI 46.53 kg/m      "

## 2022-12-05 NOTE — PROGRESS NOTES
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 Internal Medicine was consulted for post-operative medical management.      #Chronically infected left total hip arthroplasty now s/p explantation + extended debridement/reconstruction with antibiotic cement spacer (11/22/22)  -Post-op pain control per primary team  -Drain management per primary team    follow-up cultures    Currently growing pan-sensitive proteus, now with additional Finegoldia Magna ( Anaerobic gram positive).    Stopped Vancomycin    Zosyn (11/23 - 12/1)    Based on new culture data, thje following plan has been established  1. Start po ciprofloxacin 500mg BID and po metronidazole 500mg TID  2. Baseline EKG for monitoring with baseline QTc at 443  3. Duration of total 6 weeks, from 11/22/2022.   4. Follow up with outpatient ID clinic, around 1 week prior to completion of antibiotics. Should see either Nikko Velasco, or Moncho    Monitor for intolerance to meds, gabino ciprofloxacin. CRP stable in the 40's   Culturelle prescribed     It appears that patient has made very limited progress post surgery      Encephalopathy:  New onset of confusion. Patient cant seem to recall why he is here  No fevers   New meds started are Cipro and Metronidazole  Sparing use of Oxycodone  Labs largely within normal limits         #Normocytic anemia (?anemia of chronic disease) with acute blood loss anemia  - pre-operatively had hgb of 9.9 on 11/14.   -patient has received 4 units of prbc since admission  -Hemoglobin has  since stabilized at 7.5. At 8.2 on 12/3  -transfuse if <7    Outpatient w/u once acute illness resolves    Continue to monitor while resuming eliquis today      Acute hypoxic respiratory failure, resolved   - Most largely 2/to hypoventilation with background resistive lung disease given body habitus.  - VBG unremarkable w/o hypercarbia  - Lungs largely clear to auscultation bilaterally   - Does not wear O2 at home    incentive spirometry, monitor respiratory rate with continuous oximetry    oxymask as needed    PTA lasix 20 mg every day     #HALEY - resolved  Likely pre-renal given volume loss; though patient appears hypervolemic  - cont to monitor     Constipation    Daria-Colace twice daily    MiraLAX daily    Lactulose 3 times daily    Milk of mag as needed    JAIR with obesity  home CPAP- see above     DM2  - PTA med list includes metformin, glipizide, pioglitazone, victoza  -sliding scale insulin TID with meals and at bedtime ( requires minimal amounts), and therefore discontinuing  Start home dose of Metformin( 2 gms daily) and Actos 45 mg   -A1c 6.8 this admission       CAD  - continue statin, hold aspirin until cleared by surgery team     #History of DVT/PE  - previously on DOAC (eliquis) but this was held perioperatively  -If Eliquis to be resumed, the schedule is as below    Eliquis 2.5 mg bid x14 days and then eliquis 5 mg bid (starting 11/28)    Discontinue ASA       #Venous insufficiency with signs of stasis dermatitis  Erythematous patches on bilateral lower extremities - left anterior lower leg and smaller area on right lower leg  Low suspicion for cellulitis and more consistent with chronic stasis dermatitis  - PTA med list includes furosemide resumed today  - compression stockings     #Lack of motivation  - depression screen negative    Trial of low dose ritalin (not home med)     #Respiratory acidosis-- intra-operative blood gases with high CO2 and low pH. Resolved.   #Hypocalcemia Resolved   -recheck  "in AM  #Hyperkalemia- mild and intermittent on the intra-op labs. Asymptomatic  -Resolved        Diet: Advance Diet as Tolerated: Regular Diet Adult    DVT Prophylaxis: Resuming Eliquis today.  Discontinue aspirin to decrease bleeding risk  Tran Catheter: Not present  Central Lines: None  Cardiac Monitoring: None  Code Status: Full Code      Disposition Plan         The patient's care was discussed with the Patient.    Gi Bean MD  Hospitalist Service, GOLD TEAM [address] [place] [address]  Securely message with the Vocera Web Console (learn more here)  Text page via [facility] Paging/Directory   Please see signed in provider for up to date coverage information      Clinically Significant Risk Factors              # Hypoalbuminemia: Lowest albumin = 2.1 g/dL (Ref range: 3.5-5.2) at 12/4/2022 10:35 AM, will monitor as appropriate         # DMII: A1C = 6.8 % (Ref range: 0.0 - 5.6 %) within past 3 months   # Severe Obesity: Estimated body mass index is 46.53 kg/m  as calculated from the following:    Height as of this encounter: 1.778 m (5' 10\").    Weight as of this encounter: 147.1 kg (324 lb 4.8 oz).          ______________________________________________________________________    Interval History   Patient with continued intermittent  refusal of cares and possibly improved confusion  No fevers or chills  Eating and drinking well   Continues to receive encouragement from staff to participate in care and mobility         Data reviewed today: I reviewed all medications, new labs and imaging results over the last 24 hours.      Physical Exam   Vital Signs: Temp: 96.8  F (36  C) Temp src: Oral BP: 131/40 Pulse: 63   Resp: 14 SpO2: 93 % O2 Device: Oxymask Oxygen Delivery: 2 LPM (while sleeping)  Weight: 324 lbs 4.75 oz    General Appearance: Obese male, lying in bed, comfortably in bed, on room air, in no acute distress of discomfort  HEENT: PERRL: EOMI; moist mucous " membrane w/o lesions  Neck: No JVD  Pulmonary: Clear to auscultation bilaterally, no wheezes or crackles  CVS: Regular rhythm, no murmurs, rubs or gallops  GI: BS (+), soft nontender, no rebound or guarding   Extremities: Left lateral hip in dressing c/d/i; drain in place.  Skin: No rashes or lesions  Neurologic: A&O x3      Data   Recent Labs   Lab 12/05/22  1042 12/04/22  1749 12/04/22  1035 12/03/22  0920 12/03/22  0832 11/30/22  0803 11/30/22  0642 11/29/22  0926 11/29/22  0547   WBC  --   --  10.5  --  11.3*  --   --   --  10.3   HGB  --   --  8.7*  --  8.2*  --   --   --  7.8*   MCV  --   --  95  --  95  --   --   --  97   PLT  --   --  589*  --  483*  --   --   --  331   NA  --   --  139  --  140  --   --   --   --    POTASSIUM  --   --  4.1  --  4.2  --   --   --   --    CHLORIDE  --   --  107  --  107  --   --   --   --    CO2  --   --  29  --  31  --   --   --   --    BUN  --   --  18  --  20  --   --   --   --    CR  --   --  0.97  --  0.99  --  1.02  --  1.01   ANIONGAP  --   --  3  --  2*  --   --   --   --    MAIKOL  --   --  8.4*  --  8.4*  --   --   --   --    * 170* 189*   < > 175*   < >  --    < >  --    ALBUMIN  --   --  2.1*  --   --   --   --   --   --    PROTTOTAL  --   --  6.1*  --   --   --   --   --   --    BILITOTAL  --   --  0.3  --   --   --   --   --   --    ALKPHOS  --   --  74  --   --   --   --   --   --    ALT  --   --  9  --   --   --   --   --   --    AST  --   --  7  --   --   --   --   --   --     < > = values in this interval not displayed.     No results found for this or any previous visit (from the past 24 hour(s)).  Medications       apixaban ANTICOAGULANT  2.5 mg Oral BID    Followed by     [START ON 12/12/2022] apixaban ANTICOAGULANT  5 mg Oral BID     atorvastatin  40 mg Oral Daily     ciprofloxacin  500 mg Oral Q12H LUIS A (08/20)     furosemide  20 mg Oral Daily     lactobacillus rhamnosus (GG)  1 capsule Oral BID     metFORMIN  2,000 mg Oral QAM     metroNIDAZOLE  500  mg Oral TID     pioglitazone  45 mg Oral Daily     polyethylene glycol  17 g Oral Daily     senna-docusate  2 tablet Oral BID     sodium chloride (PF)  3 mL Intracatheter Q8H

## 2022-12-05 NOTE — PLAN OF CARE
VS: Pt refused overnight VS.    O2: RA when pt is awake. Oxymask on at 2L while pt is asleep. Pt refuses to wear CPAP    Output: Pt attempts to use urinal and also has episodes of incontinence. Pt refuses help for incontinence care.    Last BM: 12/4/22    Activity: AX2 with lift. Pt not OOB overnight, refused repositioning.    Up for meals? N/A   Skin: L hip incision. Abrasion to L thigh, scab to L shin. Pt declined full skin assessment.    Pain: Pt rated pain 8/10 to L hip, refused repositioning or medication interventions. Allowed Ice pack to be placed to L hip.    CMS: Baseline neuropathy to BLE per pt report. A&O x4   Dressing: L hip - CDI   Diet: Regular   LDA: PIV - SL  GUERRERO to bulb suction - 15mL output overnight.    Equipment: IV pole/pump, BSC, Bariatric bed, and pt belongings.    Plan: TBD   Additional Info: Pt continues to refuse incontinence cares, repositioning and refused bedtime BG check.

## 2022-12-05 NOTE — PLAN OF CARE
"Goal Outcome Evaluation:      Plan of Care Reviewed With: patient    Overall Patient Progress: no change    VS: VSS. Denies CP/SOB. A/O x4.   O2: >90% on RA, periodically desats. Placed on 2-3 LPM via Oxymask    Output: Voiding adequate amounts using urinal. Was incontinent x1 this shift.    Last BM: 12/2   Activity: Not OOB this shift, RN and NST attempted several times throughout the day to have pt turn in bed or be lifted up with ceiling lift to clean pt because he was incontinent, but pt continued to refuse. Later in the afternoon he agreed and we attempted to turn him in bed but once writer and NST were about to turn pt he stopped and refused stating \"it's not worth the risk to try and clean me if I am going to be in pain, I'm comfortable where I am physical therapy got me comfortable where I am\". Writer offered all interventions to help manage pain- pain meds, moving limbs slowly etc but he still refused. Pt was educated on risks of skin breakdown and wounds but still refused.      Skin: UTV backside, pt refused. Blanchable redness in perineum. L hip surgical incision   Pain: Pain in L hip, back managing with PRN oxy and tylenol.    CMS: Intact per pt    Dressing: CDI    Diet: Regular, tolerating ok. At times refused BG checks today. BG was 192 before eating breakfast.    LDA: PIV SL   Equipment: Bariatric pulsate   Plan: TBD    Additional Info: Pt refused morning oral abx, stating they upset his stomach- pt was offered Zofran (given) and Tums to help but still refused. MD notified. Agreeable later in shift when given with applesauce and Lactobacillus.        "

## 2022-12-05 NOTE — PROGRESS NOTES
"Orthopedic Surgery Progress Note: 12/05/2022    Subjective:   No acute events overnight. Tolerating a diet. No new concerns or complaints. Denies SOB, chest Pain, fevers, chills.    Patient continues to avoid mobilization. Refusing some cares, particularly out of bed to chair.     Objective:   /43 (BP Location: Left arm)   Pulse 65   Temp 96.8  F (36  C) (Oral)   Resp 16   Ht 1.778 m (5' 10\")   Wt 147.1 kg (324 lb 4.8 oz)   SpO2 93%   BMI 46.53 kg/m    No intake/output data recorded.  General: NAD. Resting comfortably in bed.  Respiratory: Breathing comfortably on RA.  Musculoskeletal:    Focused Exam of Left Lower Extremity  Dressing C/D/I.   Fires EHL, FHL, TA, GSC, Quad  SILT DP, SP, Saph, Sural, Tibial Nerve Distributions  1+ DP pulse, foot WWP. 1+ edema leg.     Output by Drain (mL) 12/03/22 0700 - 12/03/22 1459 12/03/22 1500 - 12/03/22 2259 12/03/22 2300 - 12/04/22 0659 12/04/22 0700 - 12/04/22 1459 12/04/22 1500 - 12/04/22 2259 12/04/22 2300 - 12/05/22 0625   Closed/Suction Drain 1 Left Hip Bulb 15 Chinese 30  120  50          Laboratory Data:  Lab Results   Component Value Date    WBC 10.5 12/04/2022    HGB 8.7 (L) 12/04/2022     (H) 12/04/2022         Assessment & Plan:   Waldo Bustos is a 72 year old male s/p Explantation of Chronic  Infected Left Total Hip Arthroplasty, Extended Trochanteric Osteotomy with Extensive Debridement and Reconstruction Using Antibiotic Cement Spacer on 11/22/2022 with Dr. Meyers.     12/5: Acute blood loss anemia, stabilized. Eliquis resumed. Hemoglobin stabilized  - Drain to remain in place  - Out of bed, to chair if patient allows  - ID final recommendations in (see below), started on PO Abx on 12/1    Cultures: Proteus mirabilis    ----Infectious disease recommendations below----  -po ciprofloxacin 500mg BID and po metronidazole 500mg TID  Duration of total 6 weeks, from 11/22/2022.   -follow up with ID in 5 weeks, ideally ~1 week prior to completion " of antibiotics      Activity: Up with assist and assistive devices as needed until independent.  Weight bearing status: NWB LLE  Antibiotics: Per ID, oral ciprofloxacin 500 mg bid and flagyl 500 mg TID   Diet: Begin with clear fluids and progress diet as tolerated. Bowel regimen. Anti-emetics PRN.    DVT prophylaxis: Initially on  mg (postop), Eliquis 2.5 mg BID x2 weeks after surgery (started on 11/28), then 5 mg BID PTA resumption, initially held for anemia  Elevation: Elevate heels off of bed on pillows   Wound Care: Alginate, tegaderm to be changed PRN by ortho  Drains: 15f GUERRERO drain  Tran: Removed POD1  Pain management: Orals PRN, IV for breakthrough only  X-rays: AP Pelvis and lateral in PACU complete  Physical Therapy: Mobilization, ROM, ADL's  Occupational Therapy: ADL's  Labs: Trend Hgb on POD #1, 2  Consults: PT, OT. ID. Hospitalist, appreciate assistance in caring for this patient throughout the perioperative period-     Disposition: Pending progress with therapies, pain control on orals, and medical stability; anticipate discharge to likely TCU after Hgb stabilized. ID plan in place.     Orthopedic surgery staff for this patient is Dr. Meyers.    --------------------------------------    Damian Frank MD  PGY-4 Orthopaedic Surgery

## 2022-12-06 NOTE — PROGRESS NOTES
End of Shift Summary. See flowsheets for vital signs and detailed assessment.     Changes this shift: No changes, remains A&O X4, denies pain. continues to refuse repositioning and incontinent cares. Incision intact, minimal output.       Plan:  Pending placement          Goal Outcome Evaluation:     Plan of Care Reviewed With: Patient     Overall Patient Progress: No change

## 2022-12-06 NOTE — PROGRESS NOTES
"[facility], [place]   Internal Medicine Daily Note           Interval History/Events     Hand off taken   Overnight events reviewed  Reports doing well  No nausea, vomiting, chest pain  No burning urination, abdominal pain           Review of Systems        4 point ROS including Respiratory, CV, GI and , other than that noted above is negative      Medications   I have reviewed current medications  in the \"current medication\" section of Epic.  Relevant changes include:     Physical Exam   General:       Vital signs:    Blood pressure 121/43, pulse 77, temperature (!) 96.3  F (35.7  C), temperature source Oral, resp. rate 18, height 1.778 m (5' 10\"), weight 147.1 kg (324 lb 4.8 oz), SpO2 91 %.  Estimated body mass index is 46.53 kg/m  as calculated from the following:    Height as of this encounter: 1.778 m (5' 10\").    Weight as of this encounter: 147.1 kg (324 lb 4.8 oz).      Intake/Output Summary (Last 24 hours) at 12/6/2022 1026  Last data filed at 12/6/2022 0600  Gross per 24 hour   Intake --   Output 216 ml   Net -216 ml        Constitutional: Laying in bed in no acute distress  Eye: No icterus, no pallor  Mouth/ENT: Normal oral mucosa  Cardiovascular: S1, S2 normal.   Respiratory: B/L CTA, decreased breath sounds at bases.   GI: Soft, NT, BS+  :   Neurology:   Psych:   MSK: Left hip dressing in place.   Integumentary:   Heme/Lymph/Imm:      Laboratory and Imaging Studies     I have reviewed  laboratory and imaging studies in the Epic. Pertinent findings are as below:    BMP  Recent Labs   Lab 12/06/22  0806 12/05/22  2308 12/05/22  1042 12/04/22  1749 12/04/22  1035 12/03/22  0920 12/03/22  0832 11/30/22  0803 11/30/22  0642   NA  --   --   --   --  139  --  140  --   --    POTASSIUM  --   --   --   --  4.1  --  4.2  --   --    CHLORIDE  --   --   --   --  107  --  107  --   --    MAIKOL  --   --   --   --  8.4*  --  8.4*  --   --    CO2  --   --   --   --  29  --  31  --   --  "   BUN  --   --   --   --  18  --  20  --   --    CR  --   --   --   --  0.97  --  0.99  --  1.02   * 230* 192* 170* 189*   < > 175*   < >  --     < > = values in this interval not displayed.     CBC  Recent Labs   Lab 12/04/22  1035 12/03/22  0832   WBC 10.5 11.3*   RBC 2.96* 2.76*   HGB 8.7* 8.2*   HCT 28.1* 26.3*   MCV 95 95   MCH 29.4 29.7   MCHC 31.0* 31.2*   RDW 14.8 14.7   * 483*     INRNo lab results found in last 7 days.  LFTs  Recent Labs   Lab 12/04/22  1035   ALKPHOS 74   AST 7   ALT 9   BILITOTAL 0.3   PROTTOTAL 6.1*   ALBUMIN 2.1*      PANCNo lab results found in last 7 days.        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 Internal Medicine was consulted for post-operative medical management.      # Chronically infected left total hip arthroplasty now s/p explantation + extended debridement/reconstruction with antibiotic cement spacer (11/22/22)  Wound culture from 11/22: Finegoldia, Proteus Mirabilis  Antibiotics: On Vancomycin, and Zosyn initially. Now on Ciprofloxacin, and Metronidazole.  It appears that patient has made very limited progress post surgery   Baseline EKG for monitoring with baseline QTc at 443  - Continue Ciprofloxacin, and Metronidazole. Duration of total 6 weeks, from 11/22/2022.   - Follow up with outpatient ID clinic, around 1 week prior to completion of antibiotics. Should see either Tracy Mcqueen, Nikko, or Moncho  - Monitor for intolerance to meds, gabino ciprofloxacin.   - Defer management to Ortho     # Encephalopathy:  New onset of confusion. Patient cant seem to recall why he is here. No fevers   New meds started are Cipro and Metronidazole. Sparing use of Oxycodone. Labs largely within  normal limits  - Monitor closely         # Normocytic anemia (?anemia of chronic disease) with acute blood loss anemia  Pre-operatively had hgb of 9.9 on 11/14. Patient has received 4 units of prbc since admission. Hemoglobin has since stabilized at 7.5. At 8.7 on 12/04/2022  - Transfuse if <7  - Outpatient w/u once acute illness resolves  - Continue to monitor while resuming eliquis today        # Acute hypoxic respiratory failure, resolved   Most largely d/t  hypoventilation with background resistive lung disease given body habitus. VBG unremarkable w/o hypercarbia.  Lungs largely clear to auscultation bilaterally. No oxygen at home.   - Incentive spirometry, monitor respiratory rate with continuous oximetry  - Supplement oxygen as needed.   - PTA lasix 20 mg every day      # HALEY: Resolved  Likely pre-renal given volume loss; though patient appears hypervolemic  - cont to monitor      # Constipation:  On Daria-Colace twice daily, MiraLAX daily, Lactulose 3 times daily, Milk of mag as needed  - Continue above regimen     # JAIR with obesity  home CPAP- see above     # DM2  PTA med list includes metformin, glipizide, pioglitazone, victoza  Was on Insulin Aspart correction, which has now been discontinued. Started back on Metformin( 2 gms daily) and Pioglitazone 45 mg   A1c 6.8 this admission  - Continue current regimen      # CAD:  PTA on Atorvastatin, Aspirin  Aspirin has been held post op  - continue statin, hold aspirin until cleared by surgery team     # History of DVT/PE  Previously on DOAC (eliquis) but this was held perioperatively. Apixaban has been restarted now.   - Apixaban  2.5 mg bid x14 days and then eliquis 5 mg bid (starting 11/28)          # Venous insufficiency with stasis dermatitis:   Erythematous patches on bilateral lower extremities - left anterior lower leg and smaller area on right lower leg  Low suspicion for cellulitis and more consistent with chronic stasis dermatitis  - PTA med list  includes furosemide resumed today  - compression stockings     # Lack of motivation  - depression screen negative    Trial of low dose ritalin (not home med)     # Respiratory acidosis-- intra-operative blood gases with high CO2 and low pH. Resolved.   # Hypocalcemia Resolved   # Hyperkalemia- mild and intermittent on the intra-op labs. Asymptomatic  -Resolved      Diet: Advance Diet as Tolerated: Regular Diet Adult    DVT Prophylaxis: Resuming Eliquis today.  Discontinue aspirin to decrease bleeding risk  Trna Catheter: Not present  Central Lines: None  Cardiac Monitoring: None  Code Status: Full Code          Disposition Plan    Pt's care was discussed with bedside RN, patient and  during Care Team Rounds.               Aditya Stallworth MD  Hospitalist ( Internal medicine)  Pager: [phone]

## 2022-12-06 NOTE — PLAN OF CARE
"VS: /46 (BP Location: Left arm, Patient Position: Supine)   Pulse 67   Temp 96.8  F (36  C) (Oral)   Resp 16   Ht 1.778 m (5' 10\")   Wt 147.1 kg (324 lb 4.8 oz)   SpO2 93%   BMI 46.53 kg/m   .    O2: O2 sats above 90%, denies chest pain or SOB, On when he goes to bed  CPAP on with O2 at 3   Output: Sometimes uses  urinal has incontinence episodes. Allowed incontinence care x1 this shift    Last BM: 12/5/22.    Activity: AX2 with lift.not OOB this shift allowed offloading x1 this shift lifted with celling lift    Up for meals? Yes    Skin: L hip incision,  Abrasion to left thigh declined full skin assessment.    Pain: Managed with robaxin and oxycodone    CMS: AOX4 Baseline neuropathy to BLE    Dressing:  CDI   Diet: Regular   LDA: R PIV  saline locked , JPdrain      Equipment: Bariatric bed, and pt belongings.    Plan: TBD   Additional Info: Pt refused repositioning and incontinence care most of the shift  educated and he states he is aware of skin breakdown. Staff kept trying ., allowed staff to offload using a lift , incontinence care done bed linen changed as well, We will continue to monitor and update.                            "

## 2022-12-06 NOTE — PROGRESS NOTES
"Orthopedic Surgery Progress Note: 12/06/2022    Subjective:   No acute events overnight. Tolerating a diet. No new concerns or complaints. Denies SOB, chest Pain, fevers, chills.    Patient continues to avoid mobilization. Refusing some cares, particularly out of bed to chair.  Incontinent.  Occasionally refusing cares.    Objective:   /49 (BP Location: Left arm)   Pulse 77   Temp 97.3  F (36.3  C) (Axillary)   Resp 18   Ht 1.778 m (5' 10\")   Wt 147.1 kg (324 lb 4.8 oz)   SpO2 92%   BMI 46.53 kg/m    No intake/output data recorded.  General: NAD. Resting comfortably in bed.  Respiratory: Breathing comfortably on RA.  Musculoskeletal:    Focused Exam of Left Lower Extremity  Dressing C/D/I.   Fires EHL, FHL, TA, GSC, Quad  SILT DP, SP, Saph, Sural, Tibial Nerve Distributions  1+ DP pulse, foot WWP. 1+ edema leg.     Output by Drain (mL) 12/04/22 0700 - 12/04/22 1459 12/04/22 1500 - 12/04/22 2259 12/04/22 2300 - 12/05/22 0659 12/05/22 0700 - 12/05/22 1459 12/05/22 1500 - 12/05/22 2259 12/05/22 2300 - 12/06/22 0542   Closed/Suction Drain 1 Left Hip Bulb 15 Lao  50  15 40          Laboratory Data:  Lab Results   Component Value Date    WBC 10.5 12/04/2022    HGB 8.7 (L) 12/04/2022     (H) 12/04/2022         Assessment & Plan:   Waldo Bustos is a 72 year old male s/p Explantation of Chronic  Infected Left Total Hip Arthroplasty, Extended Trochanteric Osteotomy with Extensive Debridement and Reconstruction Using Antibiotic Cement Spacer on 11/22/2022 with Dr. Meyers.     12/6: Acute blood loss anemia, stabilized. Eliquis resumed. Hemoglobin stabilized  - Remove drain today   - Out of bed, to chair if patient allows.  Encouragement required  - ID final recommendations in (see below), started on PO Abx on 12/1    Cultures: Proteus mirabilis    ----Infectious disease recommendations below----  -po ciprofloxacin 500mg BID and po metronidazole 500mg TID  Duration of total 6 weeks, from 11/22/2022. "   -follow up with ID in 5 weeks, ideally ~1 week prior to completion of antibiotics      Activity: Up with assist and assistive devices as needed until independent.  Weight bearing status: NWB LLE  Antibiotics: Per ID, oral ciprofloxacin 500 mg bid and flagyl 500 mg TID   Diet: Begin with clear fluids and progress diet as tolerated. Bowel regimen. Anti-emetics PRN.    DVT prophylaxis: Initially on  mg (postop), Eliquis 2.5 mg BID x2 weeks after surgery (started on 11/28), then 5 mg BID PTA resumption, initially held for anemia  Elevation: Elevate heels off of bed on pillows   Wound Care: Alginate, tegaderm to be changed PRN by ortho  Drains: 15f GUERRERO drain  Tran: Removed POD1  Pain management: Orals PRN, IV for breakthrough only  X-rays: AP Pelvis and lateral in PACU complete  Physical Therapy: Mobilization, ROM, ADL's  Occupational Therapy: ADL's  Labs: Trend Hgb on POD #1, 2  Consults: PT, OT. ID. Hospitalist, appreciate assistance in caring for this patient throughout the perioperative period-     Disposition: Pending progress with therapies, pain control on orals, and medical stability; anticipate discharge to likely TCU after Hgb stabilized. ID plan in place.     Orthopedic surgery staff for this patient is Dr. Meyers.    --------------------------------------    Damian Frank MD  PGY-4 Orthopaedic Surgery

## 2022-12-06 NOTE — OP NOTE
[facility] Orthopaedic Surgery  Operative Note            Procedure date 11/22/2022   Pre-operative diagnosis: Prosthetic joint infection, initial encounter (H) [T84.50XA]  Morbid obesity BMI 45.2 kg/sq m  Diabetes   Post-operative diagnosis same as pre-operative   Procedure: 1. Explantation of left longstem total hip arthroplasty  2. Extended proximal femoral trochanteric osteotomy, left femur  3. Manufacture and placement of of vancomycin tobramycin impregnated cement spacer implant   Surgeon: Rom Meyers MD   Assistants(s): MD Curt Rubin PAC   Anesthesia: General    Estimated blood loss: 1000cc   Total IV fluids: (See anesthesia record)   Blood transfusion: (See anesthesia record)   Total urine output: (See anesthesia record)   Drains: Atif-Betancourt     Specimens:  ID Type Source Tests Collected by Time Destination   A : #1 Tissue Hip, Left ANAEROBIC BACTERIAL CULTURE ROUTINE, FUNGAL OR YEAST CULTURE ROUTINE, AEROBIC BACTERIAL CULTURE ROUTINE Rom Meyers MD 11/22/2022  2:21 PM    B : #2 Tissue Hip, Left ANAEROBIC BACTERIAL CULTURE ROUTINE, FUNGAL OR YEAST CULTURE ROUTINE, AEROBIC BACTERIAL CULTURE ROUTINE Rom Meyers MD 11/22/2022  2:35 PM    C : #3 Tissue Hip, Left ANAEROBIC BACTERIAL CULTURE ROUTINE, FUNGAL OR YEAST CULTURE ROUTINE, AEROBIC BACTERIAL CULTURE ROUTINE Rom Meyers MD 11/22/2022  2:35 PM    D : #4 Tissue Hip, Left ANAEROBIC BACTERIAL CULTURE ROUTINE, FUNGAL OR YEAST CULTURE ROUTINE, AEROBIC BACTERIAL CULTURE ROUTINE Rom Meyers MD 11/22/2022  2:35 PM    E : #5 Tissue Hip, Left ANAEROBIC BACTERIAL CULTURE ROUTINE, FUNGAL OR YEAST CULTURE ROUTINE, AEROBIC BACTERIAL CULTURE ROUTINE Rom Meyers MD 11/22/2022  4:11 PM    F : #6 Tissue Hip, Left ANAEROBIC BACTERIAL CULTURE ROUTINE, FUNGAL OR YEAST CULTURE ROUTINE, AEROBIC BACTERIAL CULTURE ROUTINE Rom Meyers MD 11/22/2022  4:50 PM         Implants:  Implant Name Type Inv. Item Serial No.  Lot No. LRB No. Used Action   IMP CABLE RAJEEV SORENSON BEADED CBL W/SLEEVE SET 2MM 6704-0-520 - [id number] Metallic Hardware/[place] IMP CABLE RAJEEV SORENSON BEADED CBL W/SLEEVE SET 2MM 6704-0-520  [employer] [id number] Left 1 Implanted   IMP CABLE RAJEEV SORENSON BEADED CBL W/SLEEVE SET 2MM 6704-0-520 - [id number] Metallic Hardware/[place] IMP CABLE RAJEEV SORENSON BEADED CBL W/SLEEVE SET 2MM 6704-0-520  [employer] [id number] Left 1 Implanted   CEMENT BONE GENTAMICIN LOW VISCOSITY 1X35.3GR [id number] - [id number] Cement, Bone CEMENT BONE GENTAMICIN LOW VISCOSITY 1X35.3GR 337814  [employer] [id number] Left 3 Implanted   CEMENT BONE GENTAMICIN LOW VISCOSITY 1X35.3GR [id number] - [id number] Cement, Bone CEMENT BONE GENTAMICIN LOW VISCOSITY 1X35.3GR [id number]  [employer] [id number] Left 1 Implanted   IMP SPACER G21 CEMENT 13MM SPACEFLEX HIP MTL STEM 13616 13 - [id number] Total Joint Component/Insert IMP SPACER G21 CEMENT 13MM SPACEFLEX HIP MTL STEM [id number] 13  [employer] [id number] Left 1 Implanted       Findings:   Purulence with well fixed acetabular and femoral total of arthroplasty components.    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  TREATMENTS:  1. 3/7/2018, left to incision total hip arthroplasty ([facility])  2. 1/23/2019, revision left total hip arthroplasty ([facility]) [facility]. IV Ceftriaxone x 4 weeks  3. 2/2021, L hip aspiration ([facility])  4. 4/6/2022, C Acnes  5. 4/27/2022, drain placement. C Acnes.    Procedure details:  This patient was placed on the operating table in the lateral decubitus position.  Position was difficult due to his morbid obesity.  Standard prep and drape completed.  Patient had longstanding draining sinus involving his lateral thigh.  Posterior exposure was then undertaken.  Sinus tract was debrided and found to track deep in a circuitous  fashion to the hip joint.    After debriding the sinus tract I opened the tensor fascia jen.  The tissue planes between the gluteus bhavani and gluteus medius muscles as well as the tensor fascia jen and the vastus lateralis muscles was densely scarred.  I had to create the different tissue planes as close to anatomic position as possible given the scar tissue bed.    It was evident that a previous direct lateral Palacios approach had been undertaken.  Therefore I remove the Ethibond sutures from the reattachment of the gluteus medius muscle which was well fixed.  I then longitudinally incised the anterior half of the gluteus medius muscle along with a portion of the anterior aspect of the vastus lateralis muscle.  This continuously was then reflected off of the greater trochanter until the anterior cortex of the femur had been encountered.  I then continued to the reflected off of the anterior femur as well as the acetabulum so that the hip joint could be exposed.  I then dislocated the hip joint anteriorly.  The Sousa taper head junction of the head was removed.  It was evident that the stem was well fixed as was the acetabulum.    Purulence was encountered and 5 cultures of the hip joint deep within the wound and areas of purulence from different sites were obtained and sent for bacterial aerobic and anaerobic culture examination.    I now removed the liner from the acetabular cup and the fixation screw from the shell.  Using the acetabular osteotomes, a trial liner was placed within the acetabular shell to center the osteotome.  The short blade and a long curved blade osteotome were then used in gradual sequential fashion to loosen up the acetabular shell.  This was quite tedious due to the excellent bony ingrowth on the device.  Nonetheless once this was completed I was completely able to extract the shell with minimal bone loss.    Infection was now directed to the femur.  It was a longstem device that was  well fixed.  Therefore had to perform a extensively long proximal femoral trochanteric osteotomy.  Distance of the osteotomy was approximately 15 cm.  I used a thin blade saw to come along the anterior cortex and then along the lateral aspect of the femur.  This is nearly the entire length of the fully coated and ingrown longstem prosthesis.  A drill hole was placed anteriorly and posteriorly to minimize stress risers.  An approximately 180 direction from the anterior osteotomy site then, I performed a posterior osteotomy with care being taken to preserve as much of the soft tissue attachment to the trochanteric piece as possible.  Proximally it was necessary to use a Lambotte osteotome and direct the osteotomy towards the distal portion of the also myotomy that had been created from a distal to proximal direction.  Once this was completed, I gradually levered open the osteotomy.  I was able to lift the bone off of the stem.  I then gradually chiseled the stem loose from the remaining portion of the proximal femoral bone along the medial and posterior aspect.  The entire stem was now removed.  It was fully ingrown for the entire length.  This extraction was tedious and difficult and was fraught with risk, nonetheless we were able to complete the removal without sustaining comminution of the trochanteric fragment.    At this point now debridement of the medullary canal of the femur as well as the acetabular bed was undertaken.  Thorough irrigation and debridement of all the soft tissues around the acetabulum and hip joint and the wound edges was completed.    I then irrigated the wound with 5 L of saline solution using pulsatile lavage.  The wound was now packed with full-strength Betadine lap sponges.    \A new separate surgical drapes set up was now utilized and placed on the patient to create a new sterile field.  A new Ioban was placed and opening to match the width of the patient for a new sterile  field.    Surgical team changed gloves and gowns and new instruments were utilized from this portion of the procedure onwards.  The Betadine soaked drapes were removed from the wound.  The G 20 femoral stem device had been planned for usage however I could not fashion it to fit the proximal femur appropriately and bypassed the osteotomy site.  In addition, there is extensive soft tissue laxity as the patient had a longstem device and a long femoral neck implant placed.  Using the G 20 spacer trial, there is insufficient soft tissue tension.  Given these findings I elected to use in place a acetabular spherical spacer only and forego placement of a femoral stem as it would not have been mechanically stable.    Therefore 3 packs of the cement from the G20 device were then utilized and the head size was created by using 2 hemispheres and placed together.  An opening was cut for the cement and after vancomycin was mixed with the tobramycin cement, was injected into the spherical device.  The cement hardened and excess cement was now removed.  The spherical antibiotic vancomycin and tobramycin impregnated cement implant was now placed within the acetabulum.    Internal fixation of the proximal femoral osteotomy site was undertaken using 2 Dall-Miles 2.0 beaded cables.  The osteotomy site was then compressed together and anatomically reduced.    The femur was now reduced.  A drain was placed in the deep portion of the wound.    Wound closure was accomplished by to be opposing the anterior sleeve of the vastus lateralis and the gluteus medius muscles together followed by apposition of the tensor fascia jen and gluteus bhavani muscle.  The remaining of the wound was then closed in layers with standard technique.    The 22 modifier is appended this patient's operative procedure due to the following reasons.  This was extensively difficult and complex implant extraction given the longstem nature and fully ingrown nature of  the femoral stem.  An extended trochanteric osteotomy for a length of 15 cm was undertaken and this is markedly more difficult than a standard length so-called extended trochanteric osteotomy.  This additional complexity is also justified given the long operative time of 8 hours and 44 minutes which is 100% greater that normally associate with these procedures.      Postoperative plan:  Antibiotic therapy as directed by infectious disease  Intraoperative wound drains to be kept in place x2 weeks and removed if wound is healed sufficiently  Patient should be toe-touch weightbearing only at all times.    Rom Meyers MD  [employer] Professor  Oncology and Adult Reconstructive Surgery  Dept Orthopaedic Surgery, [facility]  [phone] office, [phone] pager  [email or web address]

## 2022-12-06 NOTE — PROGRESS NOTES
VS: VSS. Denies CP/SOB. A/O x4.   O2: >90% on RA while awake, periodically desats when sleeping. States his CPAP mask no longer fits- Respiratory called to come refit mask  Lung sounds clear, IS encouraged.     Output: Voiding adequate amounts using urinal. Was incontinent x2 this shift so placed male external catheter. Pt gets very frustrated when trying to clean him up- caridad area is very sore, educated that if we don't get him clean it will get worse, was then more agreeable, wants to try cleaning himself up the next time.    Last BM: 12/4, had a small smear today   Activity: Assist of 2-3 with cares. Pt did roll in bed today and allowed staff to change linens and incontinence care. Refuses repositioning even after education .   Pt later in the evening suggested wearing his long socks tomorrow so that he can reach down and help move his leg.    Skin: Blanchable redness in on buttocks/perineum- barrier applied. L hip surgical incision, interdry ordered.    Pain: Pain in L hip, managing with PRN oxy.    CMS: Intact, some edema in BLE    Dressing: [facility]    Diet: Regular, tolerating well    LDA: PIV SL   Equipment: Bariatric pulsate   Plan: TBD    Additional Info: Biotene mouth spray ordered for dry mouth

## 2022-12-07 NOTE — PROGRESS NOTES
Care Management Follow Up    Length of Stay (days): 15    Expected Discharge Date:  TBD     Concerns to be Addressed:  Discharge Planning       Patient plan of care discussed at interdisciplinary rounds: Yes    Anticipated Discharge Disposition: Transitional Care     Anticipated Discharge Services:  Transitional Care    Anticipated Discharge DME:  TBD    Patient/family educated on Medicare website which has current facility and service quality ratings:  Yes    Education Provided on the Discharge Plan: Yes     Patient/Family in Agreement with the Plan:  Yes    Referrals Placed by CM/SW:  [facility]    Private pay costs discussed: Not applicable    Additional Information:    Writer met with patient to discuss discharge planning. Writer asked him if he had a chance to review the Medicare Compare document which was provided to him. He stated that he had not and that he no longer had it. Writer provided him with another one. Patient gave writer permission to have [facility] screen him for admission. Writer answered his questions about TCU and asked him to choose more facilities. SW to continue to follow for discharge planning.    PHIL Castillo, MSW  Adult Acute Care Float   Pager [phone]

## 2022-12-07 NOTE — PROGRESS NOTES
"Orthopedic Surgery Progress Note: 12/07/2022    Subjective:   No acute events overnight. Tolerating a diet. No new concerns or complaints. Denies SOB, chest Pain, fevers, chills.    Patient continues to avoid mobilization. Refusing some cares, particularly out of bed to chair.  Incontinent.  Occasionally refusing cares.    Objective:   /54 (BP Location: Left arm)   Pulse 71   Temp 98.2  F (36.8  C) (Oral)   Resp 16   Ht 1.778 m (5' 10\")   Wt 147.1 kg (324 lb 4.8 oz)   SpO2 94%   BMI 46.53 kg/m    No intake/output data recorded.  General: NAD. Resting comfortably in bed.  Respiratory: Breathing comfortably on RA.  Musculoskeletal:    Focused Exam of Left Lower Extremity  Dressing C/D/I.   Fires EHL, FHL, TA, GSC, Quad  SILT DP, SP, Saph, Sural, Tibial Nerve Distributions  1+ DP pulse, foot WWP. 1+ edema leg.     Drain removed on 12/6    Laboratory Data:  Lab Results   Component Value Date    WBC 10.5 12/04/2022    HGB 8.7 (L) 12/04/2022     (H) 12/04/2022         Assessment & Plan:   Waldo Bustos is a 72 year old male s/p Explantation of Chronic  Infected Left Total Hip Arthroplasty, Extended Trochanteric Osteotomy with Extensive Debridement and Reconstruction Using Antibiotic Cement Spacer on 11/22/2022 with Dr. Meyers.     12/7: Acute blood loss anemia, stabilized. Eliquis resumed. Hemoglobin stabilized  - Out of bed, to chair if patient allows.  Encouragement required.  - ID final recommendations in (see below), started on PO Abx on 12/1    Cultures: Proteus mirabilis    ----Infectious disease recommendations below----  -po ciprofloxacin 500mg BID and po metronidazole 500mg TID  Duration of total 6 weeks, from 11/22/2022.   -follow up with ID in 5 weeks, ideally ~1 week prior to completion of antibiotics      Activity: Up with assist and assistive devices as needed until independent.  Weight bearing status: NWB LLE  Antibiotics: Per ID, oral ciprofloxacin 500 mg bid and flagyl 500 mg TID "   Diet: Begin with clear fluids and progress diet as tolerated. Bowel regimen. Anti-emetics PRN.    DVT prophylaxis: Initially on  mg (postop), NOW ON Eliquis 2.5 mg BID x2 weeks after surgery (started on 11/28), then 5 mg BID PTA resumption, initially held for anemia  Elevation: Elevate heels off of bed on pillows   Wound Care: Alginate, tegaderm to be changed PRN by ortho  Drains: 15f GUERRERO drain REMOVED on 12/6  Tran: Removed POD1  Pain management: Orals PRN, IV for breakthrough only  X-rays: AP Pelvis and lateral in PACU complete  Physical Therapy: Mobilization, ROM, ADL's  Occupational Therapy: ADL's  Labs: Trend Hgb on POD #1, 2  Consults: PT, OT. ID. Hospitalist, appreciate assistance in caring for this patient throughout the perioperative period-     Disposition: Pending progress with therapies, pain control on orals, and medical stability; anticipate discharge to likely TCU after Hgb stabilized. ID plan in place.     Orthopedic surgery staff for this patient is Dr. Meyers.    --------------------------------------    Damian Frank MD  PGY-4 Orthopaedic Surgery

## 2022-12-07 NOTE — PROGRESS NOTES
"[facility], [place]   Internal Medicine Daily Note           Interval History/Events     Overnight events reviewed  Reports doing well  No nausea, vomiting  No chest pain, shortness of breath           Review of Systems        4 point ROS including Respiratory, CV, GI and , other than that noted above is negative      Medications   I have reviewed current medications  in the \"current medication\" section of Epic.  Relevant changes include:     Physical Exam   General:       Vital signs:    Blood pressure 132/54, pulse 71, temperature 98.2  F (36.8  C), temperature source Oral, resp. rate 16, height 1.778 m (5' 10\"), weight 147.1 kg (324 lb 4.8 oz), SpO2 94 %.  Estimated body mass index is 46.53 kg/m  as calculated from the following:    Height as of this encounter: 1.778 m (5' 10\").    Weight as of this encounter: 147.1 kg (324 lb 4.8 oz).      Intake/Output Summary (Last 24 hours) at 12/6/2022 1026  Last data filed at 12/6/2022 0600  Gross per 24 hour   Intake --   Output 216 ml   Net -216 ml        Constitutional: Laying in bed in no acute distress  Eye: No icterus, no pallor  Mouth/ENT: Normal oral mucosa  Cardiovascular: S1, S2 normal.   Respiratory: B/L CTA, decreased breath sounds at bases.   GI: Soft, NT, BS+  :   Neurology: Alert, awake,and oriented.   Psych:   MSK: Left hip dressing in place.   Integumentary:   Heme/Lymph/Imm:      Laboratory and Imaging Studies     I have reviewed  laboratory and imaging studies in the Epic. Pertinent findings are as below:    [facility]  Recent Labs   Lab 12/07/22  1058 12/06/22  2205 12/06/22  1229 12/06/22  0806 12/04/22  1749 12/04/22  1035 12/03/22  0920 12/03/22  0832   NA  --   --   --   --   --  139  --  140   POTASSIUM  --   --   --   --   --  4.1  --  4.2   CHLORIDE  --   --   --   --   --  107  --  107   MAIKOL  --   --   --   --   --  8.4*  --  8.4*   CO2  --   --   --   --   --  29  --  31   BUN  --   --   --   --   --  18  --  20   CR  " --   --   --   --   --  0.97  --  0.99   * 154* 167* 191*   < > 189*   < > 175*    < > = values in this interval not displayed.     CBC  Recent Labs   Lab 12/04/22  1035 12/03/22  0832   WBC 10.5 11.3*   RBC 2.96* 2.76*   HGB 8.7* 8.2*   HCT 28.1* 26.3*   MCV 95 95   MCH 29.4 29.7   MCHC 31.0* 31.2*   RDW 14.8 14.7   * 483*     INRNo lab results found in last 7 days.  LFTs  Recent Labs   Lab 12/04/22  1035   ALKPHOS 74   AST 7   ALT 9   BILITOTAL 0.3   PROTTOTAL 6.1*   ALBUMIN 2.1*      PANCNo lab results found in last 7 days.        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 Internal Medicine was consulted for post-operative medical management.      # Chronically infected left total hip arthroplasty now s/p explantation + extended debridement/reconstruction with antibiotic cement spacer (11/22/22)  Wound culture from 11/22: Finegoldia, Proteus Mirabilis  Antibiotics: On Vancomycin, and Zosyn initially. Now on Ciprofloxacin, and Metronidazole.  It appears that patient has made very limited progress post surgery   Baseline EKG for monitoring with baseline QTc at 443  - Continue Ciprofloxacin, and Metronidazole. Duration of total 6 weeks, from 11/22/2022.   - Follow up with outpatient ID clinic, around 1 week prior to completion of antibiotics. Should see either Tracy Mcqueen, Nikko, or Moncho  - Monitor for intolerance to meds, gabino ciprofloxacin.   - Defer management to Ortho     # Encephalopathy:  New onset of confusion. Patient cant seem to recall why he is here. No fevers   New meds started are Cipro and Metronidazole. Sparing use of Oxycodone. Labs largely within normal limits  - Monitor closely         # Normocytic anemia  (?anemia of chronic disease) with acute blood loss anemia  Pre-operatively had hgb of 9.9 on 11/14. Patient has received 4 units of prbc since admission. Hemoglobin has since stabilized at 7.5. At 8.7 on 12/04/2022  - Transfuse if <7  - Outpatient w/u once acute illness resolves  - Continue to monitor while resuming eliquis today        # Acute hypoxic respiratory failure, resolved   Most largely d/t  hypoventilation with background resistive lung disease given body habitus. VBG unremarkable w/o hypercarbia.  Lungs largely clear to auscultation bilaterally. No oxygen at home.   - Incentive spirometry, monitor respiratory rate with continuous oximetry  - Supplement oxygen as needed.   - PTA lasix 20 mg every day      # HALEY: Resolved  Likely pre-renal given volume loss; though patient appears hypervolemic  - cont to monitor      # Constipation:  On Daria-Colace twice daily, MiraLAX daily, Lactulose 3 times daily, Milk of mag as needed  - Continue above regimen     # JAIR with obesity  home CPAP- see above     # DM2  PTA med list includes metformin, glipizide, pioglitazone, victoza  Was on Insulin Aspart correction, which has now been discontinued. Started back on Metformin( 2 gms daily) and Pioglitazone 45 mg   A1c 6.8 this admission  - Continue current regimen      # CAD:  PTA on Atorvastatin, Aspirin  Aspirin has been held post op  - continue statin, hold aspirin until cleared by surgery team     # History of DVT/PE  Previously on DOAC (eliquis) but this was held perioperatively. Apixaban has been restarted now.   - Apixaban  2.5 mg bid x14 days and then eliquis 5 mg bid (starting 11/28)          # Venous insufficiency with stasis dermatitis:   Erythematous patches on bilateral lower extremities - left anterior lower leg and smaller area on right lower leg  Low suspicion for cellulitis and more consistent with chronic stasis dermatitis  - PTA med list includes furosemide resumed today  - compression stockings     #  Lack of motivation  - depression screen negative    Trial of low dose ritalin (not home med)     # Respiratory acidosis-- intra-operative blood gases with high CO2 and low pH. Resolved.   # Hypocalcemia Resolved   # Hyperkalemia- mild and intermittent on the intra-op labs. Asymptomatic  -Resolved      Diet: Advance Diet as Tolerated: Regular Diet Adult    DVT Prophylaxis: Resuming Eliquis today.  Discontinue Aspirin to decrease bleeding risk  Tran Catheter: Not present  Central Lines: None  Cardiac Monitoring: None  Code Status: Full Code          Disposition Plan    Pt's care was discussed with bedside RN, patient and  during Care Team Rounds.               Aditya Stallworth MD  Hospitalist ( Internal medicine)  Pager: [phone]

## 2022-12-07 NOTE — PROGRESS NOTES
"Orthopedic Surgery Progress Note: 12/07/2022    Subjective:   No acute events overnight. Tolerating a diet. No new concerns or complaints. Denies SOB, chest Pain, fevers, chills.    Patient continues to avoid mobilization. Refusing some cares, particularly out of bed to chair.     Objective:   /54 (BP Location: Left arm)   Pulse 71   Temp 98.2  F (36.8  C) (Oral)   Resp 16   Ht 1.778 m (5' 10\")   Wt 147.1 kg (324 lb 4.8 oz)   SpO2 94%   BMI 46.53 kg/m    No intake/output data recorded.  General: NAD. Resting comfortably in bed.  Respiratory: Breathing comfortably on RA.  Musculoskeletal:    Focused Exam of Left Lower Extremity  Dressing C/D/I.   Fires EHL, FHL, TA, GSC, Quad  SILT DP, SP, Saph, Sural, Tibial Nerve Distributions  1+ DP pulse, foot WWP. 1+ edema leg.     Drain removed on 12/6    Laboratory Data:  Lab Results   Component Value Date    WBC 10.5 12/04/2022    HGB 8.7 (L) 12/04/2022     (H) 12/04/2022         Assessment & Plan:   Waldo Bustos is a 72 year old male s/p Explantation of Chronic  Infected Left Total Hip Arthroplasty, Extended Trochanteric Osteotomy with Extensive Debridement and Reconstruction Using Antibiotic Cement Spacer on 11/22/2022 with Dr. Meyers.     12/8: Acute blood loss anemia, stabilized. Eliquis resumed. Hemoglobin stabilized  - Out of bed, to chair if patient allows.  Encouragement required.  - ID final recommendations in (see below), started on PO Abx on 12/1  - Social work for TCU placement.    Cultures: Proteus mirabilis    ----Infectious disease recommendations below----  -po ciprofloxacin 500mg BID and po metronidazole 500mg TID  Duration of total 6 weeks, from 11/22/2022.   -follow up with ID in 5 weeks, ideally ~1 week prior to completion of antibiotics      Activity: Up with assist and assistive devices as needed until independent.  Weight bearing status: NWB LLE  Antibiotics: Per ID, oral ciprofloxacin 500 mg bid and flagyl 500 mg TID   Diet: " Begin with clear fluids and progress diet as tolerated. Bowel regimen. Anti-emetics PRN.    DVT prophylaxis: Initially on  mg (postop), NOW ON Eliquis 2.5 mg BID x2 weeks after surgery (started on 11/28), then 5 mg BID PTA resumption, initially held for anemia  Elevation: Elevate heels off of bed on pillows   Wound Care: Alginate, tegaderm to be changed PRN by ortho  Drains: 15f GUERRERO drain REMOVED on 12/6  Tran: Removed POD1  Pain management: Orals PRN, IV for breakthrough only  X-rays: AP Pelvis and lateral in PACU complete  Physical Therapy: Mobilization, ROM, ADL's  Occupational Therapy: ADL's  Labs: Trend Hgb on POD #1, 2  Consults: PT, OT. ID. Hospitalist, appreciate assistance in caring for this patient throughout the perioperative period-     Disposition: Pending progress with therapies, pain control on orals, and medical stability; anticipate discharge to likely TCU after Hgb stabilized. ID plan in place.     Orthopedic surgery staff for this patient is Dr. Meyers.    --------------------------------------    Damian Frank MD  PGY-4 Orthopaedic Surgery

## 2022-12-07 NOTE — PLAN OF CARE
"VS: /54 (BP Location: Left arm)   Pulse 71   Temp 98.2  F (36.8  C) (Oral)   Resp 16   Ht 1.778 m (5' 10\")   Wt 147.1 kg (324 lb 4.8 oz)   SpO2 94%   BMI 46.53 kg/m      O2: O2 sats above 90%, denies chest pain or SOB, On when he goes to bed  CPAP on with O2 at 2L   Output: Sometimes uses  urinal has incontinence episode, external catheter in place    Last BM: 12/4   Activity: AX2 with lift.not OOB this shift allowed offloading x1 this shift lifted with celling lift    Up for meals? Yes    Skin: L hip incision,  Abrasion to left thigh declined full skin assessment.    Pain: Pt denies pain,   CMS: AOX4 Baseline neuropathy to BLE    Dressing:  CDI   Diet: Regular diet    LDA: R PIV  saline locked       Equipment: Bariatric bed, and pt belongings.    Plan: TBD   Additional Info: Pt refused repositioning and incontinence, allowed for perineal care1.                           "

## 2022-12-07 NOTE — PLAN OF CARE
"Goal Outcome Evaluation:      Plan of Care Reviewed With: patient    Overall Patient Progress: no changeOverall Patient Progress: no change    Outcome Evaluation: Pt has been alert, oriented and calm this shift. Pt is still refusing repostioning frequently, but did initiate repositioning once this shift. Pt was on bedpan for a long time today, staff tried to get patient off and pt told staff to stop and go get the therapists because they are better at moving the patient than the nurses are. Staff explained that therapy is not often free and pt still needs to get up even if the therapists are not around. Pt received oxy twice, robaxin and atarax once. PIV in right hand is still patent. Looking at moving pt to a different room where a chair can be set up for pt to get out of bed.    BP (!) 141/47 (BP Location: Left arm)   Pulse 71   Temp (!) 96.7  F (35.9  C) (Oral)   Resp 18   Ht 1.778 m (5' 10\")   Wt 147.1 kg (324 lb 4.8 oz)   SpO2 96%   BMI 46.53 kg/m      "

## 2022-12-08 NOTE — PROGRESS NOTES
"[facility], [place]   Internal Medicine Daily Note           Interval History/Events     Overnight events reviewed  Reports doing well  No nausea, vomiting, chest pain, shortness of breath, fever, chills  No lightheadedness or dizziness           Review of Systems        4 point ROS including Respiratory, CV, GI and , other than that noted above is negative      Medications   I have reviewed current medications  in the \"current medication\" section of Epic.  Relevant changes include:     Physical Exam   General:       Vital signs:    Blood pressure 124/40, pulse 69, temperature 97.4  F (36.3  C), temperature source Oral, resp. rate 18, height 1.778 m (5' 10\"), weight 147.1 kg (324 lb 4.8 oz), SpO2 95 %.  Estimated body mass index is 46.53 kg/m  as calculated from the following:    Height as of this encounter: 1.778 m (5' 10\").    Weight as of this encounter: 147.1 kg (324 lb 4.8 oz).      Intake/Output Summary (Last 24 hours) at 12/6/2022 1026  Last data filed at 12/6/2022 0600  Gross per 24 hour   Intake --   Output 216 ml   Net -216 ml        Constitutional: Laying in bed in no acute distress  Eye: No icterus, no pallor  Mouth/ENT: Normal oral mucosa  Cardiovascular: S1, S2 normal.   Respiratory: B/L CTA, decreased breath sounds at bases.   GI: Soft, NT, BS+  :   Neurology: Alert, awake,and oriented.   Psych:   MSK: Left hip dressing in place.   Integumentary:   Heme/Lymph/Imm:      Laboratory and Imaging Studies     I have reviewed  laboratory and imaging studies in the Epic. Pertinent findings are as below:    BMP  Recent Labs   Lab 12/08/22  0750 12/07/22  2235 12/07/22  1058 12/06/22  2205 12/04/22  1749 12/04/22  1035 12/03/22  0920 12/03/22  0832   NA  --   --   --   --   --  139  --  140   POTASSIUM  --   --   --   --   --  4.1  --  4.2   CHLORIDE  --   --   --   --   --  107  --  107   MAIKOL  --   --   --   --   --  8.4*  --  8.4*   CO2  --   --   --   --   --  29  --  31 "   BUN  --   --   --   --   --  18  --  20   CR  --   --   --   --   --  0.97  --  0.99   * 174* 173* 154*   < > 189*   < > 175*    < > = values in this interval not displayed.     CBC  Recent Labs   Lab 12/04/22  1035 12/03/22  0832   WBC 10.5 11.3*   RBC 2.96* 2.76*   HGB 8.7* 8.2*   HCT 28.1* 26.3*   MCV 95 95   MCH 29.4 29.7   MCHC 31.0* 31.2*   RDW 14.8 14.7   * 483*     INRNo lab results found in last 7 days.  LFTs  Recent Labs   Lab 12/04/22  1035   ALKPHOS 74   AST 7   ALT 9   BILITOTAL 0.3   PROTTOTAL 6.1*   ALBUMIN 2.1*      PANCNo lab results found in last 7 days.        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 Internal Medicine was consulted for post-operative medical management.      # Chronically infected left total hip arthroplasty now s/p explantation + extended debridement/reconstruction with antibiotic cement spacer (11/22/22)  Wound culture from 11/22: Finegoldia, Proteus Mirabilis  Antibiotics: On Vancomycin, and Zosyn initially. Now on Ciprofloxacin, and Metronidazole.  It appears that patient has made very limited progress post surgery   Baseline EKG for monitoring with baseline QTc at 443  - Continue Ciprofloxacin, and Metronidazole. Duration of total 6 weeks, from 11/22/2022.   - Follow up with outpatient ID clinic, around 1 week prior to completion of antibiotics. Should see either Tracy Mcqueen, Nikko, or Moncho  - Monitor for intolerance to meds, gabino ciprofloxacin.   - Defer management to Ortho     # Encephalopathy:  New onset of confusion. Patient cant seem to recall why he is here. No fevers   New meds started are Cipro and Metronidazole. Sparing use of Oxycodone. Labs largely within normal limits  -  Monitor closely         # Normocytic anemia (?anemia of chronic disease) with acute blood loss anemia  Pre-operatively had hgb of 9.9 on 11/14. Patient has received 4 units of prbc since admission. Hemoglobin has since stabilized at 7.5. At 8.7 on 12/04/2022  - Transfuse if <7  - Outpatient w/u once acute illness resolves  - Continue to monitor while resuming eliquis today        # Acute hypoxic respiratory failure, resolved   Most largely d/t  hypoventilation with background resistive lung disease given body habitus. VBG unremarkable w/o hypercarbia.  Lungs largely clear to auscultation bilaterally. No oxygen at home.   - Incentive spirometry, monitor respiratory rate with continuous oximetry  - Supplement oxygen as needed.   - PTA lasix 20 mg every day      # HALEY: Resolved  Likely pre-renal given volume loss; though patient appears hypervolemic  - cont to monitor      # Constipation:  On Daria-Colace twice daily, MiraLAX daily, Lactulose 3 times daily, Milk of mag as needed  - Continue above regimen     # JAIR with obesity  home CPAP- see above     # DM2  PTA med list includes metformin, glipizide, pioglitazone, victoza  Was on Insulin Aspart correction, which has now been discontinued. Started back on Metformin( 2 gms daily) and Pioglitazone 45 mg   A1c 6.8 this admission  - Continue current regimen      # CAD:  PTA on Atorvastatin, Aspirin  Aspirin has been held post op  - continue statin, hold aspirin until cleared by surgery team     # History of DVT/PE  Previously on DOAC (eliquis) but this was held perioperatively. Apixaban has been restarted now.   - Apixaban  2.5 mg bid x14 days and then eliquis 5 mg bid (starting 11/28)          # Venous insufficiency with stasis dermatitis:   Erythematous patches on bilateral lower extremities - left anterior lower leg and smaller area on right lower leg  Low suspicion for cellulitis and more consistent with chronic stasis dermatitis  - PTA med list includes furosemide  resumed today  - compression stockings     # Lack of motivation  - depression screen negative    Trial of low dose ritalin (not home med)     # Respiratory acidosis-- intra-operative blood gases with high CO2 and low pH. Resolved.   # Hypocalcemia Resolved   # Hyperkalemia- mild and intermittent on the intra-op labs. Asymptomatic  -Resolved      Diet: Advance Diet as Tolerated: Regular Diet Adult    DVT Prophylaxis: Resuming Eliquis today.  Discontinue Aspirin to decrease bleeding risk  Tran Catheter: Not present  Central Lines: None  Cardiac Monitoring: None  Code Status: Full Code          Disposition Plan  Okay to discharge from medicine stand point    Pt's care was discussed with bedside RN, patient and  during Care Team Rounds.               Aditya Stallworth MD  Hospitalist ( Internal medicine)  Pager: [phone]

## 2022-12-08 NOTE — PLAN OF CARE
"VS: /40 (BP Location: Right arm)   Pulse 69   Temp 97.4  F (36.3  C) (Oral)   Resp 18   Ht 1.778 m (5' 10\")   Wt 147.1 kg (324 lb 4.8 oz)   SpO2 95%   BMI 46.53 kg/m     O2: >90% on room air. Denies SOB/N/V/chest pain   Output: Incontinent of urine, caridad care completed- caridad area is very sore   Last BM: 12/07/22   Activity: NWB to LLE. Patient Ax2-3 with ceiling lift and bed mobility. Refuses to reposition. Patient educated on pressure injury prevent, Dr. Ferrell notified.     Skin: Patient refused to turn, writer unable to assess patient posterior body. Non blanchable redness to caridad area- barrier applied. L hip surgical incision, interdry to abdominal folds.     Pain: Managed with PRN oxycodone, Atarax, and Robaxin    CMS: Alert and oriented x4. Denies numbness and tingling.    Dressing: CDI   Diet: Regular diet, BG BID and at HS    LDA: PIV to right hand saline locked    Equipment: Bariatric Pulsate mattress, sling, urinal, personal belongings, call light within patient reach    Plan: Pending placement   Additional Info:           "

## 2022-12-08 NOTE — PLAN OF CARE
"  VS: /42 (BP Location: Left arm)   Pulse 67   Temp (!) 96.3  F (35.7  C) (Oral)   Resp 18   Ht 1.778 m (5' 10\")   Wt 147.1 kg (324 lb 4.8 oz)   SpO2 94%   BMI 46.53 kg/m       O2: Sats > 90% at room air. Denies SOB and chest pain.    Output: Has external catheter in place- Primofit (changed this shift)   Adequate output.    Last BM: 12/07/22   Activity: Assist of 2 with ceiling lift transfer. Patient refused repositioning.   Up for meals In bed   Skin: L hip incision   Redness on left thigh fold, scrotum and upper inner thigh. Barrier cream applied    Pain: Patient was given oxycodone 5mg x2 this shift.   CMS: A&O x4.    Dressing: CDI   Diet: Regular    LDA: PIV on R hand, patent and saline locked.    Equipment: Bariatric bed, call light and personal belongings.    Plan: Continue with plan of care.    Additional Info:                             "

## 2022-12-09 NOTE — PROVIDER NOTIFICATION
Ortho on-call paged- pt refusing activity per report due to pain. Asked provider for IV dilaudid or pain consult

## 2022-12-09 NOTE — PROGRESS NOTES
"Orthopedic Surgery Progress Note: 12/09/2022    Subjective:   No acute events overnight. Tolerating a diet. No new concerns or complaints. Denies SOB, chest Pain, fevers, chills.    Patient continues to avoid mobilization. Refusing some cares, particularly out of bed to chair.  Incontinent.  Occasionally refusing cares.    Objective:   /51 (BP Location: Left arm, Patient Position: Supine)   Pulse 79   Temp 98.1  F (36.7  C) (Oral)   Resp 16   Ht 1.778 m (5' 10\")   Wt 147.1 kg (324 lb 4.8 oz)   SpO2 91%   BMI 46.53 kg/m    No intake/output data recorded.  General: NAD. Resting comfortably in bed.  Respiratory: Breathing comfortably on RA.  Musculoskeletal:    Focused Exam of Left Lower Extremity  Dressing C/D/I.   Fires EHL, FHL, TA, GSC, Quad  SILT DP, SP, Saph, Sural, Tibial Nerve Distributions  1+ DP pulse, foot WWP. 1+ edema leg.     Drain removed on 12/6    Laboratory Data:  Lab Results   Component Value Date    WBC 10.5 12/04/2022    HGB 8.7 (L) 12/04/2022     (H) 12/04/2022         Assessment & Plan:   Waldo Bustos is a 72 year old male s/p Explantation of Chronic  Infected Left Total Hip Arthroplasty, Extended Trochanteric Osteotomy with Extensive Debridement and Reconstruction Using Antibiotic Cement Spacer on 11/22/2022 with Dr. Meyers.     12/8: Acute blood loss anemia, stabilized. Eliquis resumed. Hemoglobin stabilized  - Out of bed, to chair if patient allows.  Encouragement required.  - ID final recommendations in (see below), started on PO Abx on 12/1    Cultures: Proteus mirabilis    ----Infectious disease recommendations below----  -po ciprofloxacin 500mg BID and po metronidazole 500mg TID  Duration of total 6 weeks, from 11/22/2022.   -follow up with ID in 5 weeks, ideally ~1 week prior to completion of antibiotics      Activity: Up with assist and assistive devices as needed until independent.  Weight bearing status: NWB LLE  Antibiotics: Per ID, oral ciprofloxacin 500 mg " bid and flagyl 500 mg TID   Diet: Begin with clear fluids and progress diet as tolerated. Bowel regimen. Anti-emetics PRN.    DVT prophylaxis: Initially on  mg (postop), NOW ON Eliquis 2.5 mg BID x2 weeks after surgery (started on 11/28), then 5 mg BID PTA resumption, initially held for anemia  Elevation: Elevate heels off of bed on pillows   Wound Care: Alginate, tegaderm to be changed PRN by ortho  Drains: 15f GUERRERO drain REMOVED on 12/6  Tran: Removed POD1  Pain management: Orals PRN, IV for breakthrough only  X-rays: AP Pelvis and lateral in PACU complete  Physical Therapy: Mobilization, ROM, ADL's  Occupational Therapy: ADL's  Labs: Trend Hgb on POD #1, 2  Consults: PT, OT. ID. Hospitalist, appreciate assistance in caring for this patient throughout the perioperative period-     Disposition: Pending progress with therapies, pain control on orals, and medical stability; anticipate discharge to likely TCU after Hgb stabilized. ID plan in place.     Orthopedic surgery staff for this patient is Dr. Meyers.    --------------------------------------    Damian Frank MD  PGY-4 Orthopaedic Surgery

## 2022-12-09 NOTE — PROGRESS NOTES
"[facility], [place]   Internal Medicine Daily Note           Interval History/Events     Overnight events reviewed  Reports no acute issues like chest pain, shortness of breath, fever, chills, lightheadedness or dizziness.            Review of Systems        4 point ROS including Respiratory, CV, GI and , other than that noted above is negative      Medications   I have reviewed current medications  in the \"current medication\" section of Epic.  Relevant changes include:     Physical Exam   General:       Vital signs:    Blood pressure (!) 145/46, pulse 71, temperature 97.3  F (36.3  C), resp. rate 16, height 1.778 m (5' 10\"), weight 147.1 kg (324 lb 4.8 oz), SpO2 91 %.  Estimated body mass index is 46.53 kg/m  as calculated from the following:    Height as of this encounter: 1.778 m (5' 10\").    Weight as of this encounter: 147.1 kg (324 lb 4.8 oz).      Intake/Output Summary (Last 24 hours) at 12/6/2022 1026  Last data filed at 12/6/2022 0600  Gross per 24 hour   Intake --   Output 216 ml   Net -216 ml        Constitutional: Laying in bed in no acute distress  Eye: No icterus, no pallor  Mouth/ENT: Normal oral mucosa  Cardiovascular: S1, S2 normal.   Respiratory: B/L CTA, decreased breath sounds at bases.   GI: Soft, NT, BS+  :   Neurology: Alert, awake,and oriented.   Psych:   MSK: Left hip dressing in place.   Integumentary:   Heme/Lymph/Imm:      Laboratory and Imaging Studies     I have reviewed  laboratory and imaging studies in the Epic. Pertinent findings are as below:    BMP  Recent Labs   Lab 12/09/22  0826 12/08/22  2130 12/08/22  0750 12/07/22  2235 12/04/22  1749 12/04/22  1035 12/03/22  0920 12/03/22  0832   NA  --   --   --   --   --  139  --  140   POTASSIUM  --   --   --   --   --  4.1  --  4.2   CHLORIDE  --   --   --   --   --  107  --  107   MAIKOL  --   --   --   --   --  8.4*  --  8.4*   CO2  --   --   --   --   --  29  --  31   BUN  --   --   --   --   --  18  --  " 20   CR  --   --   --   --   --  0.97  --  0.99   * 199* 164* 174*   < > 189*   < > 175*    < > = values in this interval not displayed.     CBC  Recent Labs   Lab 12/04/22  1035 12/03/22  0832   WBC 10.5 11.3*   RBC 2.96* 2.76*   HGB 8.7* 8.2*   HCT 28.1* 26.3*   MCV 95 95   MCH 29.4 29.7   MCHC 31.0* 31.2*   RDW 14.8 14.7   * 483*     INRNo lab results found in last 7 days.  LFTs  Recent Labs   Lab 12/04/22  1035   ALKPHOS 74   AST 7   ALT 9   BILITOTAL 0.3   PROTTOTAL 6.1*   ALBUMIN 2.1*      PANCNo lab results found in last 7 days.        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 Internal Medicine was consulted for post-operative medical management.      # Chronically infected left total hip arthroplasty now s/p explantation + extended debridement/reconstruction with antibiotic cement spacer (11/22/22)  Wound culture from 11/22: Finegoldia, Proteus Mirabilis  Antibiotics: On Vancomycin, and Zosyn initially. Now on Ciprofloxacin, and Metronidazole.  It appears that patient has made very limited progress post surgery   Baseline EKG for monitoring with baseline QTc at 443  - Continue Ciprofloxacin, and Metronidazole. Duration of total 6 weeks, from 11/22/2022.   - Follow up with outpatient ID clinic, around 1 week prior to completion of antibiotics. Should see either Tracy Mcqueen, Nikko, or Moncho  - Monitor for intolerance to meds, gabino ciprofloxacin.   - Defer management to Ortho     # Encephalopathy:  New onset of confusion. Patient cant seem to recall why he is here. No fevers   New meds started are Cipro and Metronidazole. Sparing use of Oxycodone. Labs largely within normal limits  - Monitor closely         # Normocytic  anemia (?anemia of chronic disease) with acute blood loss anemia  Pre-operatively had hgb of 9.9 on 11/14. Patient has received 4 units of prbc since admission. Hemoglobin has since stabilized at 7.5. At 8.7 on 12/04/2022  - Transfuse if <7  - Outpatient w/u once acute illness resolves  - Continue to monitor while resuming eliquis today        # Acute hypoxic respiratory failure, resolved   Most largely d/t  hypoventilation with background resistive lung disease given body habitus. VBG unremarkable w/o hypercarbia.  Lungs largely clear to auscultation bilaterally. No oxygen at home.   - Incentive spirometry, monitor respiratory rate with continuous oximetry  - Supplement oxygen as needed.   - PTA lasix 20 mg every day      # HALEY: Resolved  Likely pre-renal given volume loss; though patient appears hypervolemic  - cont to monitor      # Constipation:  On Daria-Colace twice daily, MiraLAX daily, Lactulose 3 times daily, Milk of mag as needed  - Continue above regimen     # JAIR with obesity  home CPAP- see above     # DM2  PTA med list includes metformin, glipizide, pioglitazone, victoza  Was on Insulin Aspart correction, which has now been discontinued. Started back on Metformin( 2 gms daily) and Pioglitazone 45 mg   A1c 6.8 this admission  - Continue current regimen      # CAD:  PTA on Atorvastatin, Aspirin  Aspirin has been held post op  - continue statin, hold aspirin until cleared by surgery team     # History of DVT/PE  Previously on DOAC (eliquis) but this was held perioperatively. Apixaban has been restarted now.   - Apixaban  2.5 mg bid x14 days and then eliquis 5 mg bid (starting 11/28)          # Venous insufficiency with stasis dermatitis:   Erythematous patches on bilateral lower extremities - left anterior lower leg and smaller area on right lower leg  Low suspicion for cellulitis and more consistent with chronic stasis dermatitis  - PTA med list includes furosemide resumed today  - compression  stockings     # Lack of motivation  - depression screen negative    Trial of low dose ritalin (not home med)     # Respiratory acidosis-- intra-operative blood gases with high CO2 and low pH. Resolved.   # Hypocalcemia Resolved   # Hyperkalemia- mild and intermittent on the intra-op labs. Asymptomatic  -Resolved      Diet: Advance Diet as Tolerated: Regular Diet Adult    DVT Prophylaxis: Resuming Eliquis today.  Discontinue Aspirin to decrease bleeding risk  Tran Catheter: Not present  Central Lines: None  Cardiac Monitoring: None  Code Status: Full Code          Disposition Plan  Okay to discharge from medicine stand point. Patient not participating with therapies. Reports he would like to work with strong physical person during therapies.     Pt's care was discussed with bedside RN, patient and  during Care Team Rounds.               Aditya Stallworth MD  Hospitalist ( Internal medicine)  Pager: [phone]

## 2022-12-09 NOTE — PLAN OF CARE
"  VS: BP (!) 145/46   Pulse 71   Temp 97.3  F (36.3  C)   Resp 16   Ht 1.778 m (5' 10\")   Wt 147.1 kg (324 lb 4.8 oz)   SpO2 91%   BMI 46.53 kg/m     O2: >90% RA   Output: Urinary incontinence   Last BM: 12/7/2022   Activity: Lift xfer, has been refusing repo and skin checks due to pain. Pain consult placed this shift   Skin: Redness to buttocks, peeling skin per student nurse report. Pt declined skin check by this RN   Pain: C/o unrelieved L hip throbbing pain. Prn oxy given as due   CMS: Intact   Dressing: L hip CDI   Diet: Regular   LDA: PIV R arm SL   Equipment: IV pole, green lift sling   Plan: Continue plan of care.   Additional Info:         "

## 2022-12-09 NOTE — PROGRESS NOTES
VS: Vitals stable   O2: Sating 91 % on RA, then CPAP. Lung sounds clear. Denies chest pain and SOB.   Output: Tried using urinal but spilled urine, refused primofit    Last BM: 12/7/22. Bowel sounds active x4. Passing flatus.   Activity: No oob this shift. Pt refuses to reposition   Skin:  Lt hip surgical incision, redness on scrotum and in abdominal folds.interdry   Pain: Pain was managed with oxycodone   Neuro: A&O x4. Denies N/T.   Dressing: Dressing to Lt hip C/D/I.   Diet: Regular. Appetite was good.  at 2am. Denies N/V.   LDA: PIV to Rt hand is SL.   Equipment: IV pole, estephania bed and personal belongings.   Plan: Continue to monitor. Call light within reach and utilized appropriately   Additional Info:       05:45 Pt refused to reposition or roll for cleaning all night. Wet marcelo removed from under pt and pt cleaned. Offered to use ceiling lift so pt can be properly cleaned and repositioned. Pt refused. Pt educated on the merits of reposition on skin integrity.

## 2022-12-09 NOTE — PROGRESS NOTES
CLINICAL NUTRITION SERVICES - BRIEF NOTE     Nutrition Prescription    Recommendations already ordered by Registered Dietitian (RD):  PRN snacks/supplements    Future/Additional Recommendations:  RD to sign off at this time      REASON FOR ASSESSMENT  Waldo Bustos is a/an 72 year old male assessed by the dietitian for Nutrition Risk Monitoring    Findings  Patient ordering 0-1 x per day. Pt states he has gotten Bah's brought by a friend a few times. Has mountain dew and nuts in room. Pt states he is doing okay but feels the hospital is not doing him any good. RD provided with list of snacks/supplements for pt to order as necessary, encouraged adequate protein intakes.     INTERVENTIONS  Implementation  Nutrition education for nutrition relationship to health/disease      Follow up/Monitoring  RD to sign off at this time.     Dee Alanis MS, RDN, LDN  RD pager: [phone]

## 2022-12-09 NOTE — CONSULTS
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      Pain service consulted to assist with pain management, patient is POD#17 from above surgery, not mobilizing for discharge.    \"Don\" was seen lying in bed.  He is AAOX3, calm and conversant.  He states his pain is on the left hip.  Pain is a constant, sharp pain that feels \"underneath.\"  He states that he had a difficult morning due to long session with physical therapy which is exacerbating his pain. He states that while lying in bed, \"it's not so bad.\"  Asked if the medications are effective in managing his pain, he states \"yes.\" He then states that he does not wish to make any changes to the pain regimen.  Note that he is allowed up to 60mg/day of oxycodone and at most since admission, he is using 40mg/day. Discussed plan of care with bedside RN.          Plan:   1. \"Don\" does not wish to make changes to pain medication regimen at this time-feels it's working.  Would recommend using oxycodone 45 minutes prior to Physical therapy to optimize opioid pain medication's peak effectiveness.  If needed, please maximize oxycodone Q 4 hours PRN-has not maximized available doses.  If needed can change to Q " "3 hours PRN.   Furthermore, may consider opioid rotation by stopping oxycodone and instead start Dilaudid 2-4 mg PO Q 4 hours PRN.  2. Change acetaminophen to 97mg PO Q 8 hours.  Stop PRN dosing.  3. Offered lidocaine patches and menthol patches but Don declined.  4. Can consider gabapentin 100mg PO TID.  5. Offered psychology and/or spiritual health consults by he declined.  6. Bowel regimen.    Thank you for the opportunity to participate in the care of Waldo Bustos  Pain Service will sign off.    Discussed with attending anesthesiologist  Primary Service Contacted with Recommendations? Yes    Please Page the Pain Team Via [facility]: \"PAIN MANAGEMENT ACUTE INPATIENT/ [facility]\"    Elizabet Lew PA-C  12/9/2022      Chief Complaint:  Left hip      History of Present Illness:  Waldo Bustos is a 72 year old old male with right BELLA in 2018 and revision in 2019.  He returned for explantation of chronically infected left total hip arthroplasty + extended debridement/reconstruction using antibiotic cement spacer 11/22/22.  He continues to have pain after that surgery 17 days ago.     He states his pain is on the left hip.  Pain is a constant, sharp pain that feels \"underneath.\"  He states that he had a difficult morning due to long session with physical therapy which is exacerbating his pain. He states that while lying in bed, \"it's not so bad.\"  Asked if the medications are effective in managing his pain, he states \"yes.\" He then states that he does not wish to make any changes to the pain regimen.  Note that he is allowed up to 60mg/day of oxycodone and at most since admission, he is using 40mg/day.         Pain Assessment:       Per MN  review pulled from system on 12/09/22.    No data seen on MN .      Past Medical History:  Past Medical History:   Diagnosis Date     Arthritis 5 years ago     Chronic osteoarthritis Not sure     Diabetes (H) 10-12 years ago     DVT (deep venous thrombosis) (H)      " Dyslipidemia      Gastroesophageal reflux disease      History of blood transfusion      Iliopsoas abscess (H)      Infection of prosthetic hip joint (H)      JAIR (obstructive sleep apnea)      Pulmonary embolism (H)      RA (rheumatoid arthritis) (H) Not sure     Reduced vision 2-3 years ago    Cataract Surgery on both eyes     Venous insufficiency          Family History:    Family History   Adopted: Yes   Problem Relation Age of Onset     Diabetes No family hx of      Rheumatoid Arthritis No family hx of      Low Back Problems No family hx of      Unknown/Adopted No family hx of        Social History:  Social History     Tobacco Use     Smoking status: Never     Smokeless tobacco: Never   Substance Use Topics     Alcohol use: No         Review of Systems:  Complete ROS reviewed. Unless otherwise noted, all other systems found to be negative.        Laboratory Results:  Recent Labs   Lab Test 12/04/22  1035   *   BUN 18         Allergies:  Allergies   Allergen Reactions     Sulfa Drugs      Other reaction(s): *Unknown - Childhood Rxn     Tizanidine Other (See Comments)     Frequent urination; causes drowsiness, and dry mouth           Pain Medications:  Pain Medications/Prescriber: none    Current Pain Related Medications:  Medications related to Pain Management (From now, onward)    Start     Dose/Rate Route Frequency Ordered Stop    12/07/22 0000  acetaminophen (TYLENOL) 325 MG tablet         650 mg Oral EVERY 4 HOURS PRN 12/07/22 0722      12/07/22 0000  hydrOXYzine (ATARAX) 10 MG tablet         10 mg Oral EVERY 6 HOURS PRN 12/07/22 0722      12/07/22 0000  methocarbamol (ROBAXIN) 500 MG tablet         500 mg Oral EVERY 6 HOURS PRN 12/07/22 0722      12/07/22 0000  oxyCODONE (ROXICODONE) 5 MG tablet         5 mg Oral EVERY 4 HOURS PRN 12/07/22 0722      12/07/22 0000  polyethylene glycol (MIRALAX) 17 g packet         17 g Oral DAILY 12/07/22 0722      12/07/22 0000  senna-docusate (SENOKOT-S/PERICOLACE)  "8.6-50 MG tablet         2 tablet Oral 2 TIMES DAILY 12/07/22 0722      11/26/22 2000  senna-docusate (SENOKOT-S/PERICOLACE) 8.6-50 MG per tablet 2 tablet         2 tablet Oral 2 TIMES DAILY 11/26/22 1136      11/25/22 0000  acetaminophen (TYLENOL) tablet 650 mg         650 mg Oral EVERY 4 HOURS PRN 11/22/22 2118      11/23/22 0800  polyethylene glycol (MIRALAX) Packet 17 g         17 g Oral DAILY 11/23/22 0010      11/23/22 0010  lidocaine 1 % 0.1-1 mL         0.1-1 mL Other EVERY 1 HOUR PRN 11/23/22 0010      11/23/22 0010  lidocaine (LMX4) cream          Topical EVERY 1 HOUR PRN 11/23/22 0010      11/23/22 0010  magnesium hydroxide (MILK OF MAGNESIA) suspension 30 mL         30 mL Oral DAILY PRN 11/23/22 0010      11/23/22 0010  bisacodyl (DULCOLAX) suppository 10 mg         10 mg Rectal DAILY PRN 11/23/22 0010      11/22/22 2146  methocarbamol (ROBAXIN) tablet 500 mg         500 mg Oral EVERY 6 HOURS PRN 11/22/22 2146 11/22/22 2118  oxyCODONE (ROXICODONE) tablet 5 mg        See Hyperspace for full Linked Orders Report.    5 mg Oral EVERY 4 HOURS PRN 11/22/22 2118      11/22/22 2118  oxyCODONE IR (ROXICODONE) tablet 10 mg        See Hyperspace for full Linked Orders Report.    10 mg Oral EVERY 4 HOURS PRN 11/22/22 2118      11/22/22 2118  hydrOXYzine (ATARAX) tablet 10 mg         10 mg Oral EVERY 6 HOURS PRN 11/22/22 2118              Physical Exam:  Vitals: BP (!) 145/46   Pulse 71   Temp 97.3  F (36.3  C)   Resp 16   Ht 1.778 m (5' 10\")   Wt 147.1 kg (324 lb 4.8 oz)   SpO2 91%   BMI 46.53 kg/m      Physical Exam:     CONSTITUTIONAL/GENERAL APPEARANCE:  NAD, lying in bed.  EYES: EOMI, sclera anicteric, PERRLA  ENT/NECK: atraumatic, lips and oral mucous membranes dry  RESPIRATORY: non-labored breathing. No cough, wheeze  CV: HR within normal limits  ABDOMEN: Soft, non-tender, non-distended.  Abdominal hernia felt (chronic/known)  MUSCULOSKELETAL/BACK/SPINE/EXTREMITIES: Moves all extremities " purposefully.  Left hip-dressing: c,d,i.  NEURO: Alert and Oriented x3. Answers questions appropriately  SKIN/VASCULAR EXAM:  No jaundice, no visible rashes or lesions

## 2022-12-10 NOTE — PLAN OF CARE
"  VS: BP (!) 141/43 (BP Location: Left arm)   Pulse 70   Temp 97  F (36.1  C) (Oral)   Resp 16   Ht 1.778 m (5' 10\")   Wt 147.1 kg (324 lb 4.8 oz)   SpO2 92%   BMI 46.53 kg/m      O2: Room air>90%. Denies SOB or Chest pain   Output: Voids to the urinal without difficulty/ incontinence   Last BM: 12/7/22   Activity: Assist of 2 with lift device. PT has not been OOB   Skin: Right hip surgical incision    Pain: Pt refused pain meds   CMS: Intact. Denies N/T   Dressing: L hip dressing CDI   Diet: Regular diet   LDA: R PIV -sl   Equipment: Pt belongings   Plan: Continue poc     Additional Info:                      "

## 2022-12-10 NOTE — PROGRESS NOTES
"[facility], [place]   Internal Medicine Daily Note           Interval History/Events     Overnight events reviewed  Reports not feeling well. Could not elaborate why. Feels like he is stuck in bed.   No chest pain, shortness of breath       Review of Systems        4 point ROS including Respiratory, CV, GI and , other than that noted above is negative      Medications   I have reviewed current medications  in the \"current medication\" section of Epic.  Relevant changes include:     Physical Exam   General:       Vital signs:    Blood pressure (!) 150/43, pulse 79, temperature (!) 95.9  F (35.5  C), temperature source Oral, resp. rate 16, height 1.778 m (5' 10\"), weight 147.1 kg (324 lb 4.8 oz), SpO2 93 %.  Estimated body mass index is 46.53 kg/m  as calculated from the following:    Height as of this encounter: 1.778 m (5' 10\").    Weight as of this encounter: 147.1 kg (324 lb 4.8 oz).      Intake/Output Summary (Last 24 hours) at 12/6/2022 1026  Last data filed at 12/6/2022 0600  Gross per 24 hour   Intake --   Output 216 ml   Net -216 ml        Constitutional: Laying in bed in no acute distress  Eye: No icterus, no pallor  Mouth/ENT: Normal oral mucosa  Cardiovascular: S1, S2 normal.   Respiratory: B/L CTA, decreased breath sounds at bases.   GI: Soft, NT, BS+  :   Neurology: Alert, awake,and oriented.   Psych:   MSK: Left hip dressing in place.   Integumentary:   Heme/Lymph/Imm:      Laboratory and Imaging Studies     I have reviewed  laboratory and imaging studies in the Epic. Pertinent findings are as below:    BMP  Recent Labs   Lab 12/10/22  0847 12/09/22  2208 12/09/22  1644 12/09/22  0826 12/04/22  1749 12/04/22  1035   NA  --   --   --   --   --  139   POTASSIUM  --   --   --   --   --  4.1   CHLORIDE  --   --   --   --   --  107   MAIKOL  --   --   --   --   --  8.4*   CO2  --   --   --   --   --  29   BUN  --   --   --   --   --  18   CR  --   --   --   --   --  0.97   GLC " 182* 207* 169* 169*   < > 189*    < > = values in this interval not displayed.     CBC  Recent Labs   Lab 12/04/22  1035   WBC 10.5   RBC 2.96*   HGB 8.7*   HCT 28.1*   MCV 95   MCH 29.4   MCHC 31.0*   RDW 14.8   *     INRNo lab results found in last 7 days.  LFTs  Recent Labs   Lab 12/04/22  1035   ALKPHOS 74   AST 7   ALT 9   BILITOTAL 0.3   PROTTOTAL 6.1*   ALBUMIN 2.1*      PANCNo lab results found in last 7 days.        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 Internal Medicine was consulted for post-operative medical management.      # Chronically infected left total hip arthroplasty now s/p explantation + extended debridement/reconstruction with antibiotic cement spacer (11/22/22)  Wound culture from 11/22: Finegoldia, Proteus Mirabilis  Antibiotics: On Vancomycin, and Zosyn initially. Now on Ciprofloxacin, and Metronidazole.  It appears that patient has made very limited progress post surgery   Baseline EKG for monitoring with baseline QTc at 443  - Continue Ciprofloxacin, and Metronidazole. Duration of total 6 weeks, from 11/22/2022.   - Follow up with outpatient ID clinic, around 1 week prior to completion of antibiotics. Should see either Tracy Mcqueen, Nikko, or Moncho  - Monitor for intolerance to meds, gabino ciprofloxacin.   - Defer management to Ortho     # Encephalopathy:  New onset of confusion. Patient cant seem to recall why he is here. No fevers   New meds started are Cipro and Metronidazole. Sparing use of Oxycodone. Labs largely within normal limits  - Monitor closely         # Normocytic anemia (?anemia of chronic disease) with acute blood loss anemia  Pre-operatively had hgb of 9.9 on 11/14. Patient has received  4 units of prbc since admission. Hemoglobin has since stabilized at 7.5. At 8.7 on 12/04/2022  - Transfuse if <7  - Outpatient w/u once acute illness resolves  - Continue to monitor while resuming eliquis today        # Acute hypoxic respiratory failure, resolved   Most largely d/t  hypoventilation with background resistive lung disease given body habitus. VBG unremarkable w/o hypercarbia.  Lungs largely clear to auscultation bilaterally. No oxygen at home.   - Incentive spirometry, monitor respiratory rate with continuous oximetry  - Supplement oxygen as needed.   - PTA lasix 20 mg every day      # HALEY: Resolved  Likely pre-renal given volume loss; though patient appears hypervolemic  - cont to monitor      # Constipation:  On Daria-Colace twice daily, MiraLAX daily, Lactulose 3 times daily, Milk of mag as needed  - Continue above regimen     # JAIR with obesity  home CPAP- see above     # DM2  PTA med list includes metformin, glipizide, pioglitazone, victoza  Was on Insulin Aspart correction, which has now been discontinued. Started back on Metformin( 2 gms daily) and Pioglitazone 45 mg   A1c 6.8 this admission  Blood glucose slightly on the higher, will monitor closely for now  - Continue current regimen      # CAD:  PTA on Atorvastatin, Aspirin  Aspirin has been held post op  - continue statin, hold aspirin until cleared by surgery team     # History of DVT/PE  Previously on DOAC (eliquis) but this was held perioperatively. Apixaban has been restarted now.   - Apixaban  2.5 mg bid x14 days and then eliquis 5 mg bid (starting 11/28)          # Venous insufficiency with stasis dermatitis:   Erythematous patches on bilateral lower extremities - left anterior lower leg and smaller area on right lower leg  Low suspicion for cellulitis and more consistent with chronic stasis dermatitis  - PTA med list includes furosemide resumed today  - compression stockings     # Lack of motivation  - depression screen  negative    Trial of low dose ritalin (not home med)     # Respiratory acidosis-- intra-operative blood gases with high CO2 and low pH. Resolved.   # Hypocalcemia Resolved   # Hyperkalemia- mild and intermittent on the intra-op labs. Asymptomatic  -Resolved      Diet: Advance Diet as Tolerated: Regular Diet Adult    DVT Prophylaxis: Resuming Eliquis today.  Discontinue Aspirin to decrease bleeding risk  Tran Catheter: Not present  Central Lines: None  Cardiac Monitoring: None  Code Status: Full Code          Disposition Plan  Okay to discharge from medicine stand point. Patient not participating with therapies. Reports he would like to work with strong physical person during therapies.     Pt's care was discussed with bedside RN, patient and  during Care Team Rounds.               Aditya Stallworth MD  Hospitalist ( Internal medicine)  Pager: [phone]

## 2022-12-10 NOTE — PROGRESS NOTES
"  VS: Blood pressure (!) 150/43, pulse 79, temperature (!) 95.9  F (35.5  C), temperature source Oral, resp. rate 16, height 1.778 m (5' 10\"), weight 147.1 kg (324 lb 4.8 oz), SpO2 93 %.       O2: Spo2>90 % on ra; lung sounds clear and equal bilaterally   Output: Voids adequately in urinal at beside, incontinent at times    Last BM: 12/07   Activity: Assist of two to three when OOB with lift device; pt refusing to get out of bed with lift device stating he cant and that it causes too much pain. Offered pt several medications to help with pain but pt refusing all medications as well    Skin: L hip surgical incision, scabbing on hip and ankle l leg    Pain: Rates pain high but declines any interventions    CMS: Aox4, intact    Dressing: L hip dressing CDI    Diet: Regular diet   LDA: R PIV SL   Equipment: Personal belongings, lift sling, call light in reach    Plan: Continue with POC    Additional Info:        "

## 2022-12-10 NOTE — PLAN OF CARE
Temp: 97  F (36.1  C) Temp src: Oral BP: (!) 141/43 Pulse: 70   Resp: 16 SpO2: 92 % O2 Device: None (Room air)    Patient is at baseline comfortable. Dressing to Left hip region CDI, declined to turn and reposition. Writer was unable to do skin and muscular assessments. Obese, incontinent of B&B, extensive assist of 2-3 with ceiling lift. Slept most of the shift. No concerns from patient. Continue with POC.

## 2022-12-10 NOTE — PROGRESS NOTES
"Orthopedic Surgery Progress Note: 12/10/2022    Subjective:   No acute events overnight. Tolerating a diet. No new concerns or complaints. Denies SOB, chest Pain, fevers, chills.    Still has a lot of pain, not getting out of bed    Objective:   BP (!) 150/43 (BP Location: Left arm)   Pulse 79   Temp (!) 95.9  F (35.5  C) (Oral)   Resp 16   Ht 1.778 m (5' 10\")   Wt 147.1 kg (324 lb 4.8 oz)   SpO2 93%   BMI 46.53 kg/m    No intake/output data recorded.  General: NAD. Resting comfortably in bed.  Respiratory: Breathing comfortably on RA.  Musculoskeletal:    Focused Exam of Left Lower Extremity  Dressing C/D/I.   Fires EHL, FHL, TA, GSC, Quad  SILT DP, SP, Saph, Sural, Tibial Nerve Distributions  1+ DP pulse, foot WWP. 1+ edema leg.     Drain removed on 12/6    Laboratory Data:  Lab Results   Component Value Date    WBC 10.5 12/04/2022    HGB 8.7 (L) 12/04/2022     (H) 12/04/2022         Assessment & Plan:   Waldo Bustos is a 72 year old male s/p Explantation of Chronic  Infected Left Total Hip Arthroplasty, Extended Trochanteric Osteotomy with Extensive Debridement and Reconstruction Using Antibiotic Cement Spacer on 11/22/2022 with Dr. Meyers.     12/8: Acute blood loss anemia, stabilized. Eliquis resumed. Hemoglobin stabilized  - Out of bed, to chair if patient allows.  Encouragement required.  - ID final recommendations in (see below), started on PO Abx on 12/1    Cultures: Proteus mirabilis    ----Infectious disease recommendations below----  -po ciprofloxacin 500mg BID and po metronidazole 500mg TID  Duration of total 6 weeks, from 11/22/2022.   -follow up with ID in 5 weeks, ideally ~1 week prior to completion of antibiotics      Activity: Up with assist and assistive devices as needed until independent.  Weight bearing status: NWB LLE  Antibiotics: Per ID, oral ciprofloxacin 500 mg bid and flagyl 500 mg TID   Diet: Begin with clear fluids and progress diet as tolerated. Bowel regimen. " Anti-emetics PRN.    DVT prophylaxis: Initially on  mg (postop), NOW ON Eliquis 2.5 mg BID x2 weeks after surgery (started on 11/28), then 5 mg BID PTA resumption, initially held for anemia  Elevation: Elevate heels off of bed on pillows   Wound Care: Alginate, tegaderm to be changed PRN by ortho  Drains: 15f GUERRERO drain REMOVED on 12/6  Tran: Removed POD1  Pain management: Orals PRN, IV for breakthrough only  X-rays: AP Pelvis and lateral in PACU complete  Physical Therapy: Mobilization, ROM, ADL's  Occupational Therapy: ADL's  Labs: Trend Hgb on POD #1, 2  Consults: PT, OT. ID. Hospitalist, appreciate assistance in caring for this patient throughout the perioperative period-     Disposition: Pending progress with therapies, pain control on orals, and medical stability; anticipate discharge to likely TCU after Hgb stabilized. ID plan in place.     Orthopedic surgery staff for this patient is Dr. Meyers.    --------------------------------------    Angelito Garcia MD  Adult Reconstructive Surgery Fellow  Department of Orthopaedic Surgery, [facility]

## 2022-12-11 NOTE — PROGRESS NOTES
"Orthopedic Surgery Progress Note: 12/11/2022    Subjective:   No acute events overnight. Tolerating a diet. No new concerns or complaints. Denies SOB, chest Pain, fevers, chills.    Still has a lot of pain, not getting out of bed    Objective:   /49 (BP Location: Left arm, Patient Position: Semi-Fuentes's)   Pulse 68   Temp 99.1  F (37.3  C) (Oral)   Resp 16   Ht 1.778 m (5' 10\")   Wt 147.1 kg (324 lb 4.8 oz)   SpO2 94%   BMI 46.53 kg/m    No intake/output data recorded.  General: NAD. Resting comfortably in bed.  Respiratory: Breathing comfortably on RA.  Musculoskeletal:    Focused Exam of Left Lower Extremity  Dressing C/D/I.   Fires EHL, FHL, TA, GSC, Quad  SILT DP, SP, Saph, Sural, Tibial Nerve Distributions  1+ DP pulse, foot WWP. 1+ edema leg.     Drain removed on 12/6    Laboratory Data:  Lab Results   Component Value Date    WBC 10.5 12/04/2022    HGB 8.7 (L) 12/04/2022     (H) 12/04/2022         Assessment & Plan:   Waldo Bustos is a 72 year old male s/p Explantation of Chronic  Infected Left Total Hip Arthroplasty, Extended Trochanteric Osteotomy with Extensive Debridement and Reconstruction Using Antibiotic Cement Spacer on 11/22/2022 with Dr. Meyers.     12/11:  - Out of bed, to chair if patient allows.  Encouragement required.  - ID final recommendations in (see below), started on PO Abx on 12/1    Cultures: Proteus mirabilis    ----Infectious disease recommendations below----  -po ciprofloxacin 500mg BID and po metronidazole 500mg TID  Duration of total 6 weeks, from 11/22/2022.   -follow up with ID in 5 weeks, ideally ~1 week prior to completion of antibiotics      Activity: Up with assist and assistive devices as needed until independent.  Weight bearing status: NWB LLE  Antibiotics: Per ID, oral ciprofloxacin 500 mg bid and flagyl 500 mg TID   Diet: Begin with clear fluids and progress diet as tolerated. Bowel regimen. Anti-emetics PRN.    DVT prophylaxis: Initially on ASA " 162 mg (postop), NOW ON Eliquis 2.5 mg BID x2 weeks after surgery (started on 11/28), then 5 mg BID PTA resumption, initially held for anemia  Elevation: Elevate heels off of bed on pillows   Wound Care: Alginate, tegaderm to be changed PRN by ortho  Drains: 15f GUERRERO drain REMOVED on 12/6  Tran: Removed POD1  Pain management: Orals PRN, IV for breakthrough only  X-rays: AP Pelvis and lateral in PACU complete  Physical Therapy: Mobilization, ROM, ADL's  Occupational Therapy: ADL's  Labs: Trend Hgb on POD #1, 2  Consults: PT, OT. ID. Hospitalist, appreciate assistance in caring for this patient throughout the perioperative period-     Disposition: Pending progress with therapies, pain control on orals, and medical stability; anticipate discharge to likely TCU after Hgb stabilized. ID plan in place.     Orthopedic surgery staff for this patient is Dr. Meyers.    --------------------------------------    Angelito Garcia MD  Adult Reconstructive Surgery Fellow  Department of Orthopaedic Surgery, [facility]

## 2022-12-11 NOTE — PROGRESS NOTES
"[id number]      Pt alert and oriented x4. VSS and afebrile . On CPAP with 2L. Refused assessment and repositioning. Approached multpile times this shift, pt cont to refused \" I just want to sleep.\" , education given.Assist of 2-3 with ceiling loft transfer. Decline OOB this shift. Last BM 12/6 and voiding adequate amount in urinal. 0645: pt was soaking wet on his chucks with some BM,attempted to clean him up, pt tried to turn but end up refusing, education given but pt refused. Perineal area is red and raw. Refused repositioned overnight. Calls appropriately. Safety check done this shift.   "

## 2022-12-11 NOTE — PROGRESS NOTES
"[facility], [place]   Internal Medicine Daily Note           Interval History/Events     Overnight events reviewed  Reports no acute issues this AM  No nausea, vomiting, chest pain, shortness of breath       Review of Systems        4 point ROS including Respiratory, CV, GI and , other than that noted above is negative      Medications   I have reviewed current medications  in the \"current medication\" section of Epic.  Relevant changes include:     Physical Exam   General:       Vital signs:    Blood pressure 119/49, pulse 68, temperature 99.1  F (37.3  C), temperature source Oral, resp. rate 16, height 1.778 m (5' 10\"), weight 147.1 kg (324 lb 4.8 oz), SpO2 94 %.  Estimated body mass index is 46.53 kg/m  as calculated from the following:    Height as of this encounter: 1.778 m (5' 10\").    Weight as of this encounter: 147.1 kg (324 lb 4.8 oz).      Intake/Output Summary (Last 24 hours) at 12/6/2022 1026  Last data filed at 12/6/2022 0600  Gross per 24 hour   Intake --   Output 216 ml   Net -216 ml        Constitutional: Laying in bed in no acute distress  Eye: No icterus, no pallor  Mouth/ENT: Normal oral mucosa  Cardiovascular: S1, S2 normal.   Respiratory: B/L CTA, decreased breath sounds at bases.   GI: Soft, NT, BS+  :   Neurology: Alert, awake,and oriented.   Psych:   MSK: Left hip dressing in place.   Integumentary:   Heme/Lymph/Imm:      Laboratory and Imaging Studies     I have reviewed  laboratory and imaging studies in the Epic. Pertinent findings are as below:    [facility]  Recent Labs   Lab 12/10/22  2233 12/10/22  0847 12/09/22  2208 12/09/22  1644 12/04/22  1749 12/04/22  1035   NA  --   --   --   --   --  139   POTASSIUM  --   --   --   --   --  4.1   CHLORIDE  --   --   --   --   --  107   MAIKOL  --   --   --   --   --  8.4*   CO2  --   --   --   --   --  29   BUN  --   --   --   --   --  18   CR  --   --   --   --   --  0.97   * 182* 207* 169*   < > 189*    < > = " values in this interval not displayed.     CBC  Recent Labs   Lab 12/04/22  1035   WBC 10.5   RBC 2.96*   HGB 8.7*   HCT 28.1*   MCV 95   MCH 29.4   MCHC 31.0*   RDW 14.8   *     INRNo lab results found in last 7 days.  LFTs  Recent Labs   Lab 12/04/22  1035   ALKPHOS 74   AST 7   ALT 9   BILITOTAL 0.3   PROTTOTAL 6.1*   ALBUMIN 2.1*      PANCNo lab results found in last 7 days.        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 Internal Medicine was consulted for post-operative medical management.      # Chronically infected left total hip arthroplasty now s/p explantation + extended debridement/reconstruction with antibiotic cement spacer (11/22/22)  Wound culture from 11/22: Finegoldia, Proteus Mirabilis  Antibiotics: On Vancomycin, and Zosyn initially. Now on Ciprofloxacin, and Metronidazole.  It appears that patient has made very limited progress post surgery   Baseline EKG for monitoring with baseline QTc at 443  - Continue Ciprofloxacin, and Metronidazole. Duration of total 6 weeks, from 11/22/2022.   - Follow up with outpatient ID clinic, around 1 week prior to completion of antibiotics. Should see either Nikko Velasco, or Moncho  - Monitor for intolerance to meds, gabino ciprofloxacin.   - Defer management to Ortho     # Encephalopathy:  New onset of confusion. Patient cant seem to recall why he is here. No fevers   New meds started are Cipro and Metronidazole. Sparing use of Oxycodone. Labs largely within normal limits  - Monitor closely         # Normocytic anemia (?anemia of chronic disease) with acute blood loss anemia  Pre-operatively had hgb of 9.9 on 11/14. Patient has received 4 units of prbc since admission.  Hemoglobin has since stabilized at 7.5. At 8.7 on 12/04/2022  - Transfuse if <7  - Outpatient w/u once acute illness resolves  - Continue to monitor while resuming eliquis today        # Acute hypoxic respiratory failure, resolved   Most largely d/t  hypoventilation with background resistive lung disease given body habitus. VBG unremarkable w/o hypercarbia.  Lungs largely clear to auscultation bilaterally. No oxygen at home.   - Incentive spirometry, monitor respiratory rate with continuous oximetry  - Supplement oxygen as needed.   - PTA lasix 20 mg every day      # HALEY: Resolved  Likely pre-renal given volume loss; though patient appears hypervolemic  - cont to monitor      # Constipation:  On Daria-Colace twice daily, MiraLAX daily, Lactulose 3 times daily, Milk of mag as needed  - Continue above regimen     # JAIR with obesity  home CPAP- see above     # DM2  PTA med list includes metformin, glipizide, pioglitazone, victoza  Was on Insulin Aspart correction, which has now been discontinued. Started back on Metformin( 2 gms daily) and Pioglitazone 45 mg   A1c 6.8 this admission  Blood glucose slightly on the higher, will monitor closely for now  - Continue current regimen  - Start Insulin Medium intensity correction      # CAD:  PTA on Atorvastatin, Aspirin  Aspirin has been held post op  - continue statin, hold aspirin until cleared by surgery team     # History of DVT/PE  Previously on DOAC (eliquis) but this was held perioperatively. Apixaban has been restarted now.   - Apixaban  2.5 mg bid x14 days and then eliquis 5 mg bid (starting 11/28)          # Venous insufficiency with stasis dermatitis:   Erythematous patches on bilateral lower extremities - left anterior lower leg and smaller area on right lower leg  Low suspicion for cellulitis and more consistent with chronic stasis dermatitis  - PTA med list includes furosemide resumed today  - compression stockings     # Lack of motivation  - depression screen  negative    Trial of low dose ritalin (not home med)     # Respiratory acidosis-- intra-operative blood gases with high CO2 and low pH. Resolved.   # Hypocalcemia Resolved   # Hyperkalemia- mild and intermittent on the intra-op labs. Asymptomatic  -Resolved      Diet: Advance Diet as Tolerated: Regular Diet Adult    DVT Prophylaxis: Resuming Eliquis today.  Discontinue Aspirin to decrease bleeding risk  Tran Catheter: Not present  Central Lines: None  Cardiac Monitoring: None  Code Status: Full Code          Disposition Plan  Okay to discharge from medicine stand point. Patient not participating with therapies. Reports he would like to work with strong physical person during therapies.     Pt's care was discussed with bedside RN, patient and  during Care Team Rounds.               Aditya Stallworth MD  Hospitalist ( Internal medicine)  Pager: [phone]

## 2022-12-11 NOTE — PLAN OF CARE
"  VS: /49 (BP Location: Left arm, Patient Position: Semi-Fuentes's)   Pulse 68   Temp 99.1  F (37.3  C) (Oral)   Resp 16   Ht 1.778 m (5' 10\")   Wt 147.1 kg (324 lb 4.8 oz)   SpO2 94%   BMI 46.53 kg/m     O2: Stable on room air  CPAP at night   Output: Bedside urinal- intermittent incontinence    Last BM: 12/10- refuses to be turned and cleaned up. Pt refuses for dirty marcelo pad to be removed and to be cleaned up; educated repeatedly about skin breakdown but stated \"Just leave it\".    Activity: Refuses to be repositioned- pt educated multiple times about the importance of repositioning.    Skin: L hip surgical incision, redness to scrotum, blanchable redness to R ankle- mepilex applied   Pain: C/o 10/10 pain to L hip and leg managed with medications and rest   CMS: intact   Dressing: L hip surgical incision- CDI  R ankle mepilex- CDI   Diet: Regular diet, thin liquids, takes pills whole   LDA: R PIV saline locked   Equipment: IV pole, CPAP, personal belongings   Plan: Continue plan of care. Continue to educate about the importance of repositioning and participating in therapies    Additional Info: A&Ox4. Denies headache, chest pain, SOB, numbness or tingling. Continue to refuse repositioning. Uses call light appropriately and makes needs known.        "

## 2022-12-11 NOTE — PROGRESS NOTES
"  VS: /52 (BP Location: Left arm)   Pulse 72   Temp (!) 95.8  F (35.4  C) (Oral)   Resp 16   Ht 1.778 m (5' 10\")   Wt 147.1 kg (324 lb 4.8 oz)   SpO2 92%   BMI 46.53 kg/m       O2: Room air, lung sounds clear and equal, denies SOB and chest pain   Output: Voids spontaneously in urinal   Last BM: 12/7   Activity: A2-3 when OOB with lift device, pt refused repositioning    Skin: L hip surgical incision, scabs to L ankle   Pain: Declined pain interventions, rated pain 3 out of 10   Neuro: A/Ox4, denies n/t   Dressing: L hip CDI   Diet: Regular    LDA: R PIV SL   Equipment: Iv pole, CPAP, personal belongings, call light in reach   Plan: Continue with plan of care   Additional Info: CPAP on at night with 2LPM  Pt refused all repositioning, educated on skin integrity and need for repositioning       "

## 2022-12-12 NOTE — PLAN OF CARE
"Goal Outcome Evaluation:    VS: /50 (BP Location: Left arm, Patient Position: Semi-Fuentes's)   Pulse 69   Temp (!) 96.4  F (35.8  C) (Oral)   Resp 17   Ht 1.778 m (5' 10\")   Wt 147.1 kg (324 lb 4.8 oz)   SpO2 92%   BMI 46.53 kg/m       O2: Sats >90% on RA.   Output: Pt voiding into urinal with occasional incontinence.    Last BM: LBM 12/11. Passing gas per pt report.   Activity: Not OOB. Pt refused to turn. Laying on his back for most of the shift.   Skin: Redness under abdominal fold, groin area; incision on L hip.    Pain: Pt denied pain.   Neuro: A&O X4, deneis N/T in all extremities.    Dressing: L hip incisional dressing has scant dried drainage.    Diet: Tolerating regular diet.  and 208 given sliding scale insulin per protocol.   LDA: PIV running TKO.    Equipment: IV pole, pt personal belongings.    Plan: TBD. Will continue to monitor.    Additional Info:                            "

## 2022-12-12 NOTE — PROGRESS NOTES
VS:    12/11/22 1521   Vital Signs   Temp (!) 95.7  F (35.4  C)   Temp src Oral   Resp 16   Pulse 71   Pulse Rate Source Monitor   /46   BP Location Left arm     Oxygen Saturation 95% on room air     O2: 95% on room air, denies SOB and CP, no cough present. Lung sounds clear. Uses CPAP at night when sleeping   Output: Voids spontaneously and without difficulty with urinal, can be incontinent at times   Last BM: 12/11/2022, VERY large and loose incontinent BM this shift   Activity: Assist x3 for turns and cares. Refused repositioning every 2 hours this shift with the exception of at 1800 when pt had a BM and needed to be cleaned up and for linens to be changed.   Skin: L) hip surgical incision. Added sticky note to physicians about multiple different areas of skin breakdown and asked for WOC consult. Pt has multiple areas of skin breakdown and redness. Abdominal and caridad folds, inner thighs, bilateral buttocks and right medial ankle. Pt currently has interdry in folds, mepilex on ankle, and barrier cream to inner thighs and buttocks.   Pain: Managed with PRN tylenol, robaxin, and oxycodone   Neuro: A & O x4, denies numbness and tingling   Dressing: CDI   Diet: Regular, BG checks   LDA: R) PIV SL   Equipment: Personal belongings, call light, lift, bariatric bed, urinal    Plan: TBD   Additional Info:

## 2022-12-12 NOTE — PROGRESS NOTES
"Orthopedic Surgery Progress Note: 12/12/2022    Subjective:   No acute events overnight. Tolerating a diet. No new concerns or complaints. Denies SOB, chest Pain, fevers, chills.    Still has a lot of pain, not getting out of bed    Objective:   /57 (BP Location: Right arm)   Pulse 68   Temp 96.8  F (36  C) (Oral)   Resp 16   Ht 1.778 m (5' 10\")   Wt 147.1 kg (324 lb 4.8 oz)   SpO2 95%   BMI 46.53 kg/m    No intake/output data recorded.  General: NAD. Resting comfortably in bed.  Respiratory: Breathing comfortably on RA.  Musculoskeletal:    Focused Exam of Left Lower Extremity  Dressing C/D/I.   Fires EHL, FHL, TA, GSC, Quad  SILT DP, SP, Saph, Sural, Tibial Nerve Distributions  1+ DP pulse, foot WWP. 1+ edema leg.     Drain removed on 12/6    Laboratory Data:  Lab Results   Component Value Date    WBC 8.5 12/12/2022    HGB 9.6 (L) 12/12/2022     12/12/2022         Assessment & Plan:   Waldo Bustos is a 72 year old male s/p Explantation of Chronic  Infected Left Total Hip Arthroplasty, Extended Trochanteric Osteotomy with Extensive Debridement and Reconstruction Using Antibiotic Cement Spacer on 11/22/2022 with Dr. Meyers.     12/12:  - Out of bed, to chair if patient allows.  Encouragement required.  - ID final recommendations in (see below), started on PO Abx on 12/1    Cultures: Proteus mirabilis    ----Infectious disease recommendations below----  -po ciprofloxacin 500mg BID and po metronidazole 500mg TID  Duration of total 6 weeks, from 11/22/2022.   -follow up with ID in 5 weeks, ideally ~1 week prior to completion of antibiotics      Activity: Up with assist and assistive devices as needed until independent.  Weight bearing status: NWB LLE  Antibiotics: Per ID, oral ciprofloxacin 500 mg bid and flagyl 500 mg TID   Diet: Begin with clear fluids and progress diet as tolerated. Bowel regimen. Anti-emetics PRN.    DVT prophylaxis: Initially on  mg (postop), NOW ON Eliquis 2.5 mg " BID x2 weeks after surgery (started on 11/28), then 5 mg BID PTA resumption, initially held for anemia  Elevation: Elevate heels off of bed on pillows   Wound Care: Alginate, tegaderm to be changed PRN by ortho  Drains: 15f GUERRERO drain REMOVED on 12/6  Tran: Removed POD1  Pain management: Orals PRN, IV for breakthrough only  X-rays: AP Pelvis and lateral in PACU complete  Physical Therapy: Mobilization, ROM, ADL's  Occupational Therapy: ADL's  Labs: Trend Hgb on POD #1, 2  Consults: PT, OT. ID. Hospitalist, appreciate assistance in caring for this patient throughout the perioperative period-     Disposition: Pending progress with therapies, pain control on orals, and medical stability; anticipate discharge to likely TCU after Hgb stabilized. ID plan in place.     Orthopedic surgery staff for this patient is Dr. Meyers.    --------------------------------------  Damian Frank MD  PGY-4 Orthopaedic Surgery

## 2022-12-12 NOTE — PROGRESS NOTES
"[facility], [place]   Internal Medicine Daily Note           Interval History/Events     Overnight events reviewed  Reports no acute issues this AM       Review of Systems        4 point ROS including Respiratory, CV, GI and , other than that noted above is negative      Medications   I have reviewed current medications  in the \"current medication\" section of Epic.  Relevant changes include:     Physical Exam   General:       Vital signs:    Blood pressure 125/55, pulse 64, temperature (!) 96.7  F (35.9  C), temperature source Oral, resp. rate 15, height 1.778 m (5' 10\"), weight 147.1 kg (324 lb 4.8 oz), SpO2 92 %.  Estimated body mass index is 46.53 kg/m  as calculated from the following:    Height as of this encounter: 1.778 m (5' 10\").    Weight as of this encounter: 147.1 kg (324 lb 4.8 oz).      Intake/Output Summary (Last 24 hours) at 12/6/2022 1026  Last data filed at 12/6/2022 0600  Gross per 24 hour   Intake --   Output 216 ml   Net -216 ml        Constitutional: Laying in bed in no acute distress  Eye: No icterus, no pallor  Mouth/ENT: Normal oral mucosa  Cardiovascular: S1, S2 normal.   Respiratory: B/L CTA, decreased breath sounds at bases.   GI: Soft, NT, BS+  :   Neurology: Alert, awake,and oriented.   Psych:   MSK: Left hip dressing in place.   Integumentary:   Heme/Lymph/Imm:      Laboratory and Imaging Studies     I have reviewed  laboratory and imaging studies in the Epic. Pertinent findings are as below:    BMP  Recent Labs   Lab 12/12/22  0817 12/12/22  0612 12/12/22  0209 12/11/22  2225   NA  --  139  --   --    POTASSIUM  --  4.2  --   --    CHLORIDE  --  106  --   --    MAIKOL  --  8.3*  --   --    CO2  --  30  --   --    BUN  --  30  --   --    CR  --  1.28*  --   --    * 163* 170* 199*     CBC  Recent Labs   Lab 12/12/22  0612   WBC 8.5   RBC 3.36*   HGB 9.6*   HCT 31.2*   MCV 93   MCH 28.6   MCHC 30.8*   RDW 14.6        INRNo lab results found in " last 7 days.  LFTs  No lab results found in last 7 days.   PANCNo lab results found in last 7 days.        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 Internal Medicine was consulted for post-operative medical management.      # Chronically infected left total hip arthroplasty now s/p explantation + extended debridement/reconstruction with antibiotic cement spacer (11/22/22)  Wound culture from 11/22: Finegoldia, Proteus Mirabilis  Antibiotics: On Vancomycin, and Zosyn initially. Now on Ciprofloxacin, and Metronidazole.  It appears that patient has made very limited progress post surgery   Baseline EKG for monitoring with baseline QTc at 443  - Continue Ciprofloxacin, and Metronidazole. Duration of total 6 weeks, from 11/22/2022.   - Follow up with outpatient ID clinic, around 1 week prior to completion of antibiotics. Should see either Nikko Velasco, or Moncho  - Monitor for intolerance to meds, gabino ciprofloxacin.   - Defer management to Ortho     # Encephalopathy:  New onset of confusion. Patient cant seem to recall why he is here. No fevers   New meds started are Cipro and Metronidazole. Sparing use of Oxycodone. Labs largely within normal limits  - Monitor closely         # Normocytic anemia (?anemia of chronic disease) with acute blood loss anemia  Pre-operatively had hgb of 9.9 on 11/14. Patient has received 4 units of prbc since admission. Hemoglobin has since stabilized at 7.5. At 8.7 on 12/04/2022  - Transfuse if <7  - Outpatient w/u once acute illness resolves  - Continue to monitor while resuming eliquis today        # Acute hypoxic respiratory failure, resolved   Most largely d/t  hypoventilation with background resistive  lung disease given body habitus. VBG unremarkable w/o hypercarbia.  Lungs largely clear to auscultation bilaterally. No oxygen at home.   - Incentive spirometry, monitor respiratory rate with continuous oximetry  - Supplement oxygen as needed.        # HALEY: Resolved  Likely pre-renal given volume loss; though patient appears hypervolemic  Mildly elevated creatinine on the higher side of his recent values  - Hold Furosemide  - IV fluids 75 ml/hr for 5 hours  - BMP in AM       # Constipation:  On Daria-Colace twice daily, MiraLAX daily, Lactulose 3 times daily, Milk of mag as needed  - Continue above regimen     # JAIR with obesity  home CPAP- see above     # DM2  PTA med list includes metformin, glipizide, pioglitazone, victoza  Was on Insulin Aspart correction, which has now been discontinued. Started back on Metformin( 2 gms daily) and Pioglitazone 45 mg   A1c 6.8 this admission  Blood glucose slightly on the higher, will monitor closely for now  - Continue current regimen  - Start Insulin Medium intensity correction      # CAD:  PTA on Atorvastatin, Aspirin  Aspirin has been held post op  - continue statin, hold aspirin until cleared by surgery team     # History of DVT/PE  Previously on DOAC (eliquis) but this was held perioperatively. Apixaban has been restarted now.   - Apixaban  2.5 mg bid x14 days and then eliquis 5 mg bid (starting 11/28)          # Venous insufficiency with stasis dermatitis:   Erythematous patches on bilateral lower extremities - left anterior lower leg and smaller area on right lower leg  Low suspicion for cellulitis and more consistent with chronic stasis dermatitis  - PTA med list includes furosemide resumed today  - compression stockings     # Lack of motivation  - depression screen negative    Trial of low dose ritalin (not home med)     # Respiratory acidosis-- intra-operative blood gases with high CO2 and low pH. Resolved.   # Hypocalcemia Resolved   # Hyperkalemia- mild and  intermittent on the intra-op labs. Asymptomatic  -Resolved      Diet: Advance Diet as Tolerated: Regular Diet Adult    DVT Prophylaxis: Resuming Eliquis today.  Discontinue Aspirin to decrease bleeding risk  Tran Catheter: Not present  Central Lines: None  Cardiac Monitoring: None  Code Status: Full Code          Disposition Plan  Okay to discharge from medicine stand point. Patient not participating with therapies. Reports he would like to work with strong physical person during therapies.     Pt's care was discussed with bedside RN, patient and  during Care Team Rounds.               Aditya Stallworth MD  Hospitalist ( Internal medicine)  Pager: [phone]

## 2022-12-12 NOTE — PLAN OF CARE
Goal Outcome Evaluation:      Plan of Care Reviewed With: patient    Overall Patient Progress: improvingOverall Patient Progress: improving    Outcome Evaluation: Pt refused CPAP , refused turning says will call when he need us. Pt asked if can try Primofit to protect skin from breaking down due to urine says will think about it because he uses urinal but wets the bed and refuses to change. Teaching done on skin breakdown pt says not concerned about that and Monday PT will be here. I asked the patient if he will get oob he did not answer. Pt wet at 0400 called to give towell to dry self I said the pad is wet if we can change pt refused asked to put another but I could only tuck more on the front. Will ontinue to monitor, gown was changed and message lotion to feet and arms.    Patient vital signs are at baseline: Yes  Patient able to ambulate as they were prior to admission or with assist devices provided by therapies during their stay:  No,  Reason:  Still not gotten OOB since surgery  Patient MUST void prior to discharge:  Yes  Patient able to tolerate oral intake:  Yes  Pain has adequate pain control using Oral analgesics:  Yes  Does patient have an identified :  Yes  Has goal D/C date and time been discussed with patient:  Yes    VS: Temp: 96.8  F (36  C) Temp src: Oral BP: 127/57 Pulse: 68   Resp: 16 SpO2: 95 % O2 Device: None (Room air)      O2:  RA. LS clear and equal bilaterally. Denies chest pain and SOB.  Refused CPAP for the night   Output: Voids spontaneously without difficulty, pt is incontinent.   Last BM: BM 12/11/22 incontinent denies abdominal discomfort. BS active / passing flatus.    Activity: Has not gotten OOB since surgery   Skin: WDL except, caridad area, thighs inner, buttocks but I was not able to assess. Left ankle old scab.   Pain: Pain managed with    CMS: Intact, AOx4. Denies numbness and tingling.   Dressing: Left thigh dressing clean and dry   Diet: Regular diet. Denies  nausea/vomiting.      LDA: No IV   Equipment: IV pole, personal belongings,    Plan: FALL precautions maintained / Continue with plan of care. Call light within reach, pt able to make needs known.    Additional Info: TBD

## 2022-12-12 NOTE — PLAN OF CARE
Pt A/O X 4. Afebrile. VSS. Lungs-Clear bilaterally with both anterior and posterior. Bowels-Active in all four quadrants, last BM 12-11-22. Voids spontaneously without difficulty into the urinal. Denies nausea and vomiting. CMS and Neuro's are intact. Denies numbness and tingling in all extremities. Has pain in the left hip area, pt given Oxycodone, and pain is manageable. Is on a Regular diet and appetite was Fair this shift. Blood glucose levels 150,167, and sliding scale insulin given. Left hip incisional dressing is C/D/I. Pt has blanchable redness under the abdominal folds, intra-dry dressings placed. Mepilex in place to the right medial ankle. Pt refused to get out of bed this shift, and pt refused repositioning in his bed. Pt is assist X 2-3 staff for repositioning in bed when the pt wants to be repositioned. Pt also declined a full posterior skin/body assessment. PIV patent in the left arm and infusing continuous fluids. PCD's on BLE's. Pt is on a bariatric bed, and the bilateral heels are elevated off the bed. Pt is able to make needs known, and call light is within reach. Continue to monitor.

## 2022-12-13 NOTE — PROGRESS NOTES
"[facility], [place]   Internal Medicine Daily Note           Interval History/Events     Overnight events reviewed  No new issues.   He was pleasant.   Denies nausea, vomit, abdominal pain, chest pain, palpitations, shortness of breath, fever or chills.        Review of Systems        4 point ROS including Respiratory, CV, GI and , other than that noted above is negative      Medications   I have reviewed current medications  in the \"current medication\" section of Epic.  Relevant changes include:     Physical Exam   General:       Vital signs:    Blood pressure 135/47, pulse 86, temperature (!) 96.7  F (35.9  C), resp. rate 16, height 1.778 m (5' 10\"), weight 147.1 kg (324 lb 4.8 oz), SpO2 91 %.  Estimated body mass index is 46.53 kg/m  as calculated from the following:    Height as of this encounter: 1.778 m (5' 10\").    Weight as of this encounter: 147.1 kg (324 lb 4.8 oz).    Constitutional: Laying in bed in no acute distress  Eye: No icterus, no pallor  Mouth/ENT: Normal oral mucosa  Cardiovascular: S1, S2 normal.   Respiratory: B/L CTA, decreased breath sounds at bases.   GI: Soft, NT, BS+  : Deferred.   Neurology: Alert, awake,and oriented.   Psych: Mood stable  MSK: Left hip dressing in place.      Laboratory and Imaging Studies     I have reviewed  laboratory and imaging studies in the Epic. Pertinent findings are as below:    BMP  Recent Labs   Lab 12/13/22  0941 12/13/22  0911 12/13/22  0222 12/12/22  2227 12/12/22  0817 12/12/22  0612   NA  --  140  --   --   --  139   POTASSIUM  --  4.2  --   --   --  4.2   CHLORIDE  --  109  --   --   --  106   MAIKOL  --  8.3*  --   --   --  8.3*   CO2  --  26  --   --   --  30   BUN  --  26  --   --   --  30   CR  --  1.26*  --   --   --  1.28*   * 168* 166* 208*   < > 163*    < > = values in this interval not displayed.     CBC  Recent Labs   Lab 12/12/22  0612   WBC 8.5   RBC 3.36*   HGB 9.6*   HCT 31.2*   MCV 93   MCH 28.6 "   MCHC 30.8*   RDW 14.6        INRNo lab results found in last 7 days.  LFTs  No lab results found in last 7 days.   PANCNo lab results found in last 7 days.        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 Internal Medicine was consulted for post-operative medical management.      # Chronically infected left total hip arthroplasty now s/p explantation + extended debridement/reconstruction with antibiotic cement spacer (11/22/22)  Wound culture from 11/22: Finegoldia, Proteus Mirabilis  Antibiotics: On Vancomycin, and Zosyn initially. Now on Ciprofloxacin, and Metronidazole.  It appears that patient has made very limited progress post surgery   Baseline EKG for monitoring with baseline QTc at 443  - Continue Ciprofloxacin, and Metronidazole. Duration of total 6 weeks, from 11/22/2022.   - Follow up with outpatient ID clinic, around 1 week prior to completion of antibiotics. Should see either Nikko Velasco, or Moncho  - Monitor for intolerance to meds, gabino ciprofloxacin.   - Defer management to Ortho     # Encephalopathy:  New onset of confusion. Patient cant seem to recall why he is here. No fevers   New meds started are Cipro and Metronidazole. Sparing use of Oxycodone. Labs largely within normal limits  - Resolved. Continue monitoring.          # Normocytic anemia (?anemia of chronic disease) with acute blood loss anemia  Pre-operatively had hgb of 9.9 on 11/14. Patient has received 4 units of prbc since admission. Hemoglobin has since stabilized at 7.5. At 8.7 on 12/04/2022  - Transfuse if <7  - Outpatient w/u once acute illness resolves  - Continue to monitor while resuming eliquis today        # Acute hypoxic respiratory  failure, resolved   Most largely d/t  hypoventilation with background resistive lung disease given body habitus. VBG unremarkable w/o hypercarbia.  Lungs largely clear to auscultation bilaterally. No oxygen at home.   - Incentive spirometry, monitor respiratory rate with continuous oximetry  - Supplement oxygen as needed.        # HALEY: Resolved  Likely pre-renal given volume loss; though patient appears hypervolemic  Mildly elevated creatinine on the higher side of his recent values  - Hold Furosemide  - Cr 1.26, monitor        # Constipation:  On Daria-Colace twice daily, MiraLAX daily, Lactulose 3 times daily, Milk of mag as needed  - Continue above regimen     # JAIR with obesity  home CPAP- see above     # DM2  PTA med list includes metformin, glipizide, pioglitazone, victoza  Was on Insulin Aspart correction, which has now been discontinued. Started back on Metformin( 2 gms daily) and Pioglitazone 45 mg   A1c 6.8 this admission  Blood glucose slightly on the higher, will monitor closely for now  - Continue current regimen  - Start Insulin Medium intensity correction      # CAD:  PTA on Atorvastatin, Aspirin  Aspirin has been held post op  - continue statin, hold aspirin until cleared by surgery team     # History of DVT/PE  Previously on DOAC (eliquis) but this was held perioperatively. Apixaban has been restarted now.   - Apixaban  2.5 mg bid x14 days and then eliquis 5 mg bid (starting 11/28)    # Venous insufficiency with stasis dermatitis:   Erythematous patches on bilateral lower extremities - left anterior lower leg and smaller area on right lower leg  Low suspicion for cellulitis and more consistent with chronic stasis dermatitis  - PTA med list includes furosemide resumed today  - compression stockings     # Lack of motivation  - depression screen negative    Trial of low dose ritalin (not home med)     # Respiratory acidosis-- intra-operative blood gases with high CO2 and low pH. Resolved.   #  Hypocalcemia Resolved   # Hyperkalemia- mild and intermittent on the intra-op labs. Asymptomatic  -Resolved      Diet: Advance Diet as Tolerated: Regular Diet Adult    DVT Prophylaxis: Resuming Eliquis today.  Discontinue Aspirin to decrease bleeding risk  Trna Catheter: Not present  Central Lines: None  Cardiac Monitoring: None  Code Status: Full Code        Disposition Plan  Okay to discharge from medicine stand point. Patient not participating with therapies. Reports he would like to work with strong physical person during therapies.     Pt's care was discussed with bedside RN, patient and  during Care Team Rounds.     Cuba Markham MD  Hospitalist ( Internal medicine)  Pager: [phone]

## 2022-12-13 NOTE — PROGRESS NOTES
VS: VSS. Denies CP/SOB. A/O x4.   O2: >90% on RA while awake, periodically desats when sleeping. Uses CPAP overnight      Output: Voiding adequate amounts, using urinal   Last BM: 12/11- loose per pt report and documentation. Pt refused bowel meds.    Activity: Assist of 2-3 with cares. Not OOB this shift. Did allow us to give him a bed bath, roll and change his linens     Skin: Blanchable redness in on buttocks/perineum- barrier applied. L hip surgical incision, interdry ordered.    Pain: Pain in L hip, declined anything for pain management    CMS: Intact, edema in BLE    Dressing: CDI    Diet: Regular, tolerating well    LDA: PIV SL   Equipment: Bariatric pulsate   Plan:  transfer to 6MS   Additional Info: All belongings packed and sent with pt. Report given to Elif LÓPEZ on 6MS.

## 2022-12-13 NOTE — PLAN OF CARE
Patient is alert and oriented x 4, able to make needs and wants known    Transfer from ortho 1516 due to need for double ceiling lifts    PIV LFA saline locked    Patient refused repositioning despite reeducation and repeat prompts    Unable to visualize sacrum/coccyx due to continued refusal. Left hip surgical incision BRII with steri-strip in place. Some bruising to Left thigh, skin intact and a small dried scab to Left Shin. There is redness and some irritation to abdominal folds - patient refused a more thorough assessment     BS before meals, per report, patient refused to eat lunch and also doesn't want anything for dinner    Patient SCD's were in place for a couple of hours, removed periodically per patient request    LBM 12/11/2022    Patient able to call appropriately for assist and is able to ask for urinal. Patient does have urinary urgency and intermittent incontinence    No further issues this shift

## 2022-12-13 NOTE — PLAN OF CARE
"Goal Outcome Evaluation:      Plan of Care Reviewed With: patient    Overall Patient Progress: no changeOverall Patient Progress: no change    Outcome Evaluation: Pt continues to declined positioning. LLE frequently tucked in with external rotation. Pt stated that the limb is tender so declined positoining but is rejecting pain managment when offered. pt on pulsate mattris. Overnight timed toileting was attempted with succsess for preventing incontinence.         VS:     Pt A/O X 4. Afebrile. Lungs sounds diminished in both anterior and lateral aspects, using CPAP overnight with 3 Lpm O2. Denies nausea, dizziness, fever,shortness of breath, chest pain, or newly occurring numbness/tingling. /44 (BP Location: Left arm)   Pulse 69   Temp (!) 96  F (35.6  C) (Oral)   Resp 16   Ht 1.778 m (5' 10\")   Wt 147.1 kg (324 lb 4.8 oz)   SpO2 (!) 85%   BMI 46.53 kg/m        Output:     LBM 12/11. Continent overnight with assistance using urinal. Urine is concentrated.    Activity:     A of 2 with a lift. A of 3 with reposition in bed when doing so but Pt refusing to reposition overnight or allow for full skin check.      Skin: Skin reddened in creases with generalized edema and increased edema to LLE. Skin breakdown occurring in panus, LLE between thigh and knee, scrotum, perineum.       Pain:     No pain reported, but refused repositioning due to LLE being tender.    CMS:     CMS intact with slightly lessened pulsus in B/L pedal and dorsal pulses but <3 sec cap refill. Denies numbness and tingling in all extremities.       Dressing:      Dressing intact to L lateral aspect of hip and thigh. Small gauze dressing medial to that dressing. Inter dry in panus.      Diet:     Pt is on a regular diet, thin liquids.       LDA:          Equipment:     CPAP, Pulsate bariatric matris, Lift sheet.       Plan:     Call light in reach, pt belongings in room, continue to monitor.       Additional Info:     ALFREDO 166 at 2am.    "

## 2022-12-13 NOTE — PROGRESS NOTES
"Orthopedic Surgery Progress Note: 12/13/2022    Subjective:   No acute events overnight. Tolerating a diet. No new concerns or complaints. Denies SOB, chest Pain, fevers, chills.    Still has a lot of pain, not getting out of bed    Objective:   /44 (BP Location: Left arm)   Pulse 69   Temp (!) 96  F (35.6  C) (Oral)   Resp 16   Ht 1.778 m (5' 10\")   Wt 147.1 kg (324 lb 4.8 oz)   SpO2 90%   BMI 46.53 kg/m    I/O this shift:  In: -   Out: 250 [Urine:250]  General: NAD. Resting comfortably in bed.  Respiratory: Breathing comfortably on RA.  Musculoskeletal:    Focused Exam of Left Lower Extremity  Incision healing well, Steri-Strips in place.  Glue beneath.  Fires EHL, FHL, TA, GSC, Quad  SILT DP, SP, Saph, Sural, Tibial Nerve Distributions  1+ DP pulse, foot WWP. 1+ edema leg.     Drain removed on 12/6    Laboratory Data:  Lab Results   Component Value Date    WBC 8.5 12/12/2022    HGB 9.6 (L) 12/12/2022     12/12/2022         Assessment & Plan:   Waldo Bustos is a 72 year old male s/p Explantation of Chronic  Infected Left Total Hip Arthroplasty, Extended Trochanteric Osteotomy with Extensive Debridement and Reconstruction Using Antibiotic Cement Spacer on 11/22/2022 with Dr. Meyers.     12/13: Dressing removed today. Ok for open to air.   - Out of bed, to chair if patient allows.  Encouragement required.  - ID final recommendations in (see below), started on PO Abx on 12/1    Cultures: Proteus mirabilis    ----Infectious disease recommendations below----  -po ciprofloxacin 500mg BID and po metronidazole 500mg TID  Duration of total 6 weeks, from 11/22/2022.   -follow up with ID in 5 weeks, ideally ~1 week prior to completion of antibiotics      Activity: Up with assist and assistive devices as needed until independent.  Weight bearing status: NWB LLE  Antibiotics: Per ID, oral ciprofloxacin 500 mg bid and flagyl 500 mg TID   Diet: Begin with clear fluids and progress diet as tolerated. Bowel " regimen. Anti-emetics PRN.    DVT prophylaxis: Initially on  mg (postop), NOW ON Eliquis 2.5 mg BID x2 weeks after surgery (started on 11/28), then 5 mg BID PTA resumption, initially held for anemia  Elevation: Elevate heels off of bed on pillows   Wound Care: Open to air.  Drains: 15f GUERRERO drain REMOVED on 12/6  Tran: Removed POD1  Pain management: Orals PRN, IV for breakthrough only  X-rays: AP Pelvis and lateral in PACU complete  Physical Therapy: Mobilization, ROM, ADL's  Occupational Therapy: ADL's  Labs: Trend Hgb on POD #1, 2  Consults: PT, OT. ID. Hospitalist, appreciate assistance in caring for this patient throughout the perioperative period-     Disposition: Pending progress with therapies, pain control on orals, and medical stability; anticipate discharge to likely TCU after Hgb stabilized. ID plan in place.     Orthopedic surgery staff for this patient is Dr. Meyers.    --------------------------------------  Damian Frank MD  PGY-4 Orthopaedic Surgery

## 2022-12-14 NOTE — PLAN OF CARE
Problem: Plan of Care - These are the overarching goals to be used throughout the patient stay.    Goal: Absence of Hospital-Acquired Illness or Injury  Outcome: Progressing     Problem: Plan of Care - These are the overarching goals to be used throughout the patient stay.    Goal: Optimal Comfort and Wellbeing  Outcome: Progressing

## 2022-12-14 NOTE — PROGRESS NOTES
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 Internal Medicine was consulted for post-operative medical management.     Today's changes: 12/14/2022  Doing well.   No new concern  wocn consult requested per RN, multiple areas of skin breakdown.     # Chronically infected left total hip arthroplasty now s/p explantation + extended debridement/reconstruction with antibiotic cement spacer (11/22/22)  Wound culture from 11/22: Finegoldia, Proteus Mirabilis  Antibiotics: On Vancomycin, and Zosyn initially. Now on Ciprofloxacin, and Metronidazole.  It appears that patient has made very limited progress post surgery   Baseline EKG for monitoring with baseline QTc at 443  - Continue Ciprofloxacin, and Metronidazole. Duration of total 6 weeks, from 11/22/2022.   - Follow up with outpatient ID clinic, around 1 week prior to completion of antibiotics. Should see either Tracy Mcqueen, Nikko, or Moncho  - Monitor for intolerance to meds, gabino ciprofloxacin.   - Defer management to Ortho     # Encephalopathy:  New onset of confusion. Patient cant seem to recall why he is here. No fevers   New meds started are Cipro and Metronidazole. Sparing use of Oxycodone. Labs largely within normal limits  - Resolved. Continue monitoring.          # Normocytic anemia (?anemia of chronic disease) with acute blood loss anemia  Pre-operatively had hgb of 9.9 on 11/14. Patient has received 4 units of prbc since admission. Hemoglobin has since stabilized at 7.5. At 8.7 on 12/04/2022  - Transfuse if <7  - Outpatient w/u once  acute illness resolves  - Continue to monitor while resuming eliquis today        # Acute hypoxic respiratory failure, resolved   Most largely d/t  hypoventilation with background resistive lung disease given body habitus. VBG unremarkable w/o hypercarbia.  Lungs largely clear to auscultation bilaterally. No oxygen at home.   - Incentive spirometry, monitor respiratory rate with continuous oximetry  - Supplement oxygen as needed.         # HALEY: Resolved  Likely pre-renal given volume loss; though patient appears hypervolemic  Mildly elevated creatinine on the higher side of his recent values  - Hold Furosemide  - Cr 1.26, monitor         # Constipation:  On Daria-Colace twice daily, MiraLAX daily, Lactulose 3 times daily, Milk of mag as needed  - Continue above regimen     # JAIR with obesity  home CPAP- see above     # DM2  PTA med list includes metformin, glipizide, pioglitazone, victoza  Was on Insulin Aspart correction, which has now been discontinued. Started back on Metformin( 2 gms daily) and Pioglitazone 45 mg   A1c 6.8 this admission  Blood glucose slightly on the higher, will monitor closely for now  - Continue current regimen  - Start Insulin Medium intensity correction      # CAD:  PTA on Atorvastatin, Aspirin  Aspirin has been held post op  - continue statin, hold aspirin until cleared by surgery team     # History of DVT/PE  Previously on DOAC (eliquis) but this was held perioperatively. Apixaban has been restarted now.   - Apixaban  2.5 mg bid x14 days and then eliquis 5 mg bid (starting 11/28)     # Venous insufficiency with stasis dermatitis:   Erythematous patches on bilateral lower extremities - left anterior lower leg and smaller area on right lower leg  Low suspicion for cellulitis and more consistent with chronic stasis dermatitis  - PTA med list includes furosemide resumed today  - compression stockings     # Lack of motivation  - depression screen negative    Trial of low dose ritalin (not home  "med)     # Respiratory acidosis-- intra-operative blood gases with high CO2 and low pH. Resolved.   # Hypocalcemia Resolved   # Hyperkalemia- mild and intermittent on the intra-op labs. Asymptomatic  -Resolved          Diet: Advance Diet as Tolerated: Regular Diet Adult  Diet    DVT Prophylaxis: DOAC  Tran Catheter: Not present  Central Lines: None  Cardiac Monitoring: None  Code Status: Full Code      Disposition Plan      TBD. Aw placement.     The patient's care was discussed with the Care Coordinator/, Patient, Primary team and RN.    Nnamdi Esquivel MD  Hospitalist Service, [address]  Securely message with the Vocera Web Console (learn more here)  Text page via [facility] Paging/Directory   Please see signed in provider for up to date coverage information      Clinically Significant Risk Factors              # Hypoalbuminemia: Lowest albumin = 2.1 g/dL at 12/4/2022 10:35 AM, will monitor as appropriate          # DMII: A1C = 6.8 % (Ref range: 0.0 - 5.6 %) within past 3 months   # Severe Obesity: Estimated body mass index is 46.53 kg/m  as calculated from the following:    Height as of this encounter: 1.778 m (5' 10\").    Weight as of this encounter: 147.1 kg (324 lb 4.8 oz).          ______________________________________________________________________    Interval History   Interval events reviewed.   States doing well  Denies fever or chills.   No cough or cp or sob.   No LH or dizziness.   No NV or pain abdomen.   No new sensory or motor complaint.   Incisional pain +    No other new or acute medical concern      Data reviewed today: I reviewed all medications, new labs and imaging results over the last 24 hours. I personally reviewed no images or EKG's today.    Physical Exam   Vital Signs: Temp: 98.1  F (36.7  C) Temp src: Oral BP: 137/45 Pulse: 64   Resp: 16 SpO2: 93 % O2 Device: BiPAP/CPAP Oxygen Delivery: 3 LPM  Weight: 324 lbs 4.75 " oz    General Appearance: Awake, interactive, NAD  Respiratory: Normal work of breathing.  Cardiovascular: RRR  GI: Soft. NT. ND.  Extremities:1+ BL pedal edema  Skin: per Ortho: incision healing well.   Neuro: Alert oriented.    Others: Stable mood.       Data   Recent Labs   Lab 12/14/22  1225 12/14/22  0821 12/14/22  0155 12/13/22  0941 12/13/22  0911 12/12/22  0817 12/12/22  0612 12/11/22  2225 12/11/22 2033   WBC  --   --   --   --   --   --  8.5  --   --    HGB  --   --   --   --   --   --  9.6*  --  9.8*   MCV  --   --   --   --   --   --  93  --   --    PLT  --   --   --   --   --   --  421  --   --    NA  --   --   --   --  140  --  139  --   --    POTASSIUM  --   --   --   --  4.2  --  4.2  --   --    CHLORIDE  --   --   --   --  109  --  106  --   --    CO2  --   --   --   --  26  --  30  --   --    BUN  --   --   --   --  26  --  30  --   --    CR  --   --   --   --  1.26*  --  1.28*  --   --    ANIONGAP  --   --   --   --  5  --  3  --   --    MAIKOL  --   --   --   --  8.3*  --  8.3*  --   --    * 161* 136*   < > 168*   < > 163*   < >  --     < > = values in this interval not displayed.     No results found for this or any previous visit (from the past 24 hour(s)).  Medications       apixaban ANTICOAGULANT  5 mg Oral BID     atorvastatin  40 mg Oral Daily     ciprofloxacin  500 mg Oral Q12H [facility] (08/20)     [Held by provider] furosemide  20 mg Oral Daily     insulin aspart  1-7 Units Subcutaneous TID AC     insulin aspart  1-5 Units Subcutaneous At Bedtime     lactobacillus rhamnosus (GG)  1 capsule Oral BID     metFORMIN  2,000 mg Oral QAM     metroNIDAZOLE  500 mg Oral TID     pioglitazone  45 mg Oral Daily     polyethylene glycol  17 g Oral Daily     senna-docusate  2 tablet Oral BID     sodium chloride (PF)  3 mL Intracatheter Q8H

## 2022-12-14 NOTE — PLAN OF CARE
Temp: 98.3  F (36.8  C) Temp src: Oral BP: 136/51 Pulse: 68   Resp: 16 SpO2: 93 % O2 Device: BiPAP/CPAP Oxygen Delivery: 3 LPM    [id number]  No acute events overnight  AOx4. Denies CP, SOB, N/T.  Voiding without difficulty. LBM 12/11/22- refused scheduled senna  Pt c/o pain in LLE; managed with PRN oxy  L PIV SL  Call light within reach at all times. Pt able to make needs known.  Continue with POC

## 2022-12-14 NOTE — PROGRESS NOTES
"Orthopedic Surgery Progress Note: 12/14/2022    Subjective:   No acute events overnight. Tolerating a diet. No new concerns or complaints. Denies SOB, chest Pain, fevers, chills.    Still has a lot of pain, not getting out of bed    Objective:   /51 (BP Location: Left arm)   Pulse 68   Temp 98.3  F (36.8  C) (Oral)   Resp 16   Ht 1.778 m (5' 10\")   Wt 147.1 kg (324 lb 4.8 oz)   SpO2 93%   BMI 46.53 kg/m    No intake/output data recorded.  General: NAD. Resting comfortably in bed.  Respiratory: Breathing comfortably on RA.  Musculoskeletal:    Focused Exam of Left Lower Extremity  Incision healing well, Steri-Strips in place.  Glue beneath.  Fires EHL, FHL, TA, GSC, Quad  SILT DP, SP, Saph, Sural, Tibial Nerve Distributions  1+ DP pulse, foot WWP. 1+ edema leg.     Drain removed on 12/6    Laboratory Data:  Lab Results   Component Value Date    WBC 8.5 12/12/2022    HGB 9.6 (L) 12/12/2022     12/12/2022         Assessment & Plan:   Waldo Bustos is a 72 year old male s/p Explantation of Chronic  Infected Left Total Hip Arthroplasty, Extended Trochanteric Osteotomy with Extensive Debridement and Reconstruction Using Antibiotic Cement Spacer on 11/22/2022 with Dr. Meyers.     12/14: Dressing removed today. Ok for open to air.   - Out of bed, to chair if patient allows.  Encouragement required.  - ID final recommendations in (see below), started on PO Abx on 12/1    Cultures: Proteus mirabilis    ----Infectious disease recommendations below----  -po ciprofloxacin 500mg BID and po metronidazole 500mg TID  Duration of total 6 weeks, from 11/22/2022.   -follow up with ID in 5 weeks, ideally ~1 week prior to completion of antibiotics      Activity: Up with assist and assistive devices as needed until independent.  Weight bearing status: NWB LLE  Antibiotics: Per ID, oral ciprofloxacin 500 mg bid and flagyl 500 mg TID   Diet: Begin with clear fluids and progress diet as tolerated. Bowel regimen. " Anti-emetics PRN.    DVT prophylaxis: Initially on  mg (postop), NOW ON Eliquis 2.5 mg BID x2 weeks after surgery (started on 11/28), then 5 mg BID PTA resumption, initially held for anemia  Elevation: Elevate heels off of bed on pillows   Wound Care: Open to air.  Drains: 15f GUERRERO drain REMOVED on 12/6  Tran: Removed POD1  Pain management: Orals PRN, IV for breakthrough only  X-rays: AP Pelvis and lateral in PACU complete  Physical Therapy: Mobilization, ROM, ADL's  Occupational Therapy: ADL's  Labs: Trend Hgb on POD #1, 2  Consults: PT, OT. ID. Hospitalist, appreciate assistance in caring for this patient throughout the perioperative period-     Disposition: Pending progress with therapies, pain control on orals, and medical stability; anticipate discharge to likely TCU after Hgb stabilized. ID plan in place.     Orthopedic surgery staff for this patient is Dr. Meyers.    --------------------------------------  Damian Frank MD  PGY-4 Orthopaedic Surgery

## 2022-12-15 NOTE — PLAN OF CARE
[id number]    A&O x4.  Calls appropriately, able to make needs known.  Transfers via lift, but not OOB this shift.    Writer received report that pt has very red, edematous caridad area.  Pt refused full skin assessment.  Writer was only able to visualize visible skin on arms/hands and L hip surgical incisions.  Incisions scabbed over and open to air, sutures and steri-strips in place.    Pt allowed writer to slightly reposition to more central placement on mattress, but refused modification with hip alignment, said it felt better with shifting over.    Pt reported loose stool earlier in day, evening Senna held.    No acute events overnight.

## 2022-12-15 NOTE — CONSULTS
WOC Consult    S: WOC Consult to evaluate and treat skin breakdown on abdominal and perineal skin folds, inner thighs, bilateral buttocks and right medial ankle.     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 Internal Medicine was consulted for post-operative medical management.     A: Patient refusing to turn despite chux being saturated with urine. Attempted to use lift to get patient up to chair with RN and NA, patient refusing due to pain.     Patient does have red skin between anterior skin folds. Routine skin cares are appropriate at this time.     Right medial ankle with bruise, no open wound or pressure injury.    P: WOC contacted PT. Will attempt to coordinate visit with PT while working to get out of bed.     Continue to encourage patient to turn to perform pericares. Follow Incontinence Protocol for skin care.     If unable to coordinate with PT today, WOC will follow up tomorrow.     Kristen Hadley RN CWOCN  Pager [phone]

## 2022-12-15 NOTE — PLAN OF CARE
Goal Outcome Evaluation:      Plan of Care Reviewed With: patient    Overall Patient Progress: improvingOverall Patient Progress: improving  VS:     Vital signs:  Temp: 97.9  F (36.6  C) Temp src: Oral BP: 127/61 Pulse: 63   Resp: 17 SpO2: 96 % O2 Device: None (Room air).   Output:     Bowels is audible in all four quadrants. Pt is incontinent both bowel and bladder. LBM 12/15/2022.   Activity: Assist x2, pt declined frequency repositioning. Pt let the caregivers changes him x1 today.   Skin: Redness on abdominal folds,groin area and buttock, skin barrier applied.   Pain:     Pt complained  generalized pain upon position changes, wound nurse tried to assess his body,pt refused,writer and WOC nurse attempted transferring him to chair, but pt said it hurts I don't want move, and writer offered to medicate him before transfer or position changes but pt declined. Tylenol and Oxycodone given for pain 8/10 after changing him x1, and the dose was effective.   CMS:   Pt has generalized weakness.   Dressing: Lt hip incisional dressing in dry,clean and intact and wound edges approximated.   Diet: Pt is on a regular diet but, pt did not eat food this shift. He said I don't want food.   LDA: PIV in the Lt arm is patent.   Equipment: Commode, IV pump.   Plan:     Pt is able to make needs known and the call light is within the pt's reach. Continue to monitor.    Additional Info:

## 2022-12-15 NOTE — PROGRESS NOTES
"  VS: /45 (BP Location: Right arm)   Pulse 64   Temp 98.1  F (36.7  C) (Oral)   Resp 16   Ht 1.778 m (5' 10\")   Wt 147.1 kg (324 lb 4.8 oz)   SpO2 93%   BMI 46.53 kg/m       O2: 98% RA    Output: Voiding without difficulty    Last BM: 12/14   Activity: Liift    Skin: CDI, scrotum red, irritated, swollen, painful    Pain: Pt reports 9/10 during repositioning    CMS: A/Ox4   Dressing: NA   Diet: Regular thin liquid    LDA: NA   Plan: Continue with care    Additional Info: Pt declined dinner tonight          "

## 2022-12-15 NOTE — PROGRESS NOTES
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 Internal Medicine was consulted for post-operative medical management.     Today's changes:   12/15/2022    Doing well.   No new concern  Pain controlled  No new changes.   PT, OT.    Aw placement.       # Chronically infected left total hip arthroplasty now s/p explantation + extended debridement/reconstruction with antibiotic cement spacer (11/22/22)  Wound culture from 11/22: Finegoldia, Proteus Mirabilis  -Antibiotics: On Vancomycin, and Zosyn initially. Now on Ciprofloxacin, and Metronidazole.  -It appears that patient has made very limited progress post surgery  - Baseline EKG for monitoring with baseline QTc at 443  - Continue Ciprofloxacin, and Metronidazole. Duration of total 6 weeks, from 11/22/2022.   - Follow up with outpatient ID clinic, around 1 week prior to completion of antibiotics. Should see either Tracy Mcqueen, Nikko, or Moncho  - Monitor for intolerance to meds, gabino ciprofloxacin.   - Defer management to Ortho     # Encephalopathy:  New onset of confusion. Patient cant seem to recall why he is here. No fevers   New meds started are Cipro and Metronidazole. Sparing use of Oxycodone. Labs largely within normal limits  - Resolved. Continue monitoring.          # Normocytic anemia (?anemia of chronic disease) with acute blood loss anemia  Pre-operatively had hgb of 9.9 on 11/14. Patient has received 4 units of prbc since admission. Hemoglobin has since stabilized at 7.5. At 8.7 on 12/04/2022  - Transfuse if <7  - Outpatient w/u  once acute illness resolves  - Continue to monitor while resuming eliquis today        # Acute hypoxic respiratory failure, resolved   Most largely d/t  hypoventilation with background resistive lung disease given body habitus. VBG unremarkable w/o hypercarbia.  Lungs largely clear to auscultation bilaterally. No oxygen at home.   - Incentive spirometry, monitor respiratory rate with continuous oximetry  - Supplement oxygen as needed.         # HALEY: Resolved  Likely pre-renal given volume loss; though patient appears hypervolemic  Mildly elevated creatinine on the higher side of his recent values  - Hold Furosemide  - Cr 1.26, monitor         # Constipation:  On Daria-Colace twice daily, MiraLAX daily, Lactulose 3 times daily, Milk of mag as needed  - Continue above regimen     # JAIR with obesity  home CPAP- see above     # DM2  PTA med list includes metformin, glipizide, pioglitazone, victoza  Was on Insulin Aspart correction, which has now been discontinued. Started back on Metformin( 2 gms daily) and Pioglitazone 45 mg   A1c 6.8 this admission  Blood glucose slightly on the higher, will monitor closely for now  - Continue current regimen  - Start Insulin Medium intensity correction      # CAD:  PTA on Atorvastatin, Aspirin  Aspirin has been held post op  - continue statin, hold aspirin until cleared by surgery team     # History of DVT/PE  Previously on DOAC (eliquis) but this was held perioperatively. Apixaban has been restarted now.   - Apixaban  2.5 mg bid x14 days and then eliquis 5 mg bid (starting 11/28)     # Venous insufficiency with stasis dermatitis:   Erythematous patches on bilateral lower extremities - left anterior lower leg and smaller area on right lower leg  Low suspicion for cellulitis and more consistent with chronic stasis dermatitis  - PTA med list includes furosemide resumed today  - compression stockings     # Lack of motivation  - depression screen negative    Trial of low dose ritalin (not  "home med)     # Respiratory acidosis-- intra-operative blood gases with high CO2 and low pH. Resolved.   # Hypocalcemia Resolved   # Hyperkalemia- mild and intermittent on the intra-op labs. Asymptomatic  -Resolved          Diet: Advance Diet as Tolerated: Regular Diet Adult  Diet    DVT Prophylaxis: DOAC  Tran Catheter: Not present  Central Lines: None  Cardiac Monitoring: None  Code Status: Full Code      Disposition Plan      TBD. Aw placement.     The patient's care was discussed with the Care Coordinator/, Patient, Primary team and RN.    Nnamdi Esquivel MD  Hospitalist Service, [address]  Securely message with the Vocera Web Console (learn more here)  Text page via [facility] Paging/Directory   Please see signed in provider for up to date coverage information      Clinically Significant Risk Factors              # Hypoalbuminemia: Lowest albumin = 2.1 g/dL at 12/4/2022 10:35 AM, will monitor as appropriate          # DMII: A1C = 6.8 % (Ref range: 0.0 - 5.6 %) within past 3 months   # Severe Obesity: Estimated body mass index is 46.53 kg/m  as calculated from the following:    Height as of this encounter: 1.778 m (5' 10\").    Weight as of this encounter: 147.1 kg (324 lb 4.8 oz).          ______________________________________________________________________    Interval History   Interval events reviewed.   States doing well  Denies fever or chills.   No cough or cp or sob.   No LH or dizziness.   No NV or pain abdomen.   No new sensory or motor complaint.   Pain manageable.     No other new or acute medical concern      Data reviewed today: I reviewed all medications, new labs and imaging results over the last 24 hours. I personally reviewed no images or EKG's today.    Physical Exam   Vital Signs: Temp: 97.9  F (36.6  C) Temp src: Oral BP: 127/61 Pulse: 63   Resp: 17 SpO2: 96 % O2 Device: None (Room air)    Weight: 324 lbs 4.75 " oz    General Appearance: Awake, interactive, NAD  Respiratory: Normal work of breathing.  Cardiovascular: RRR   GI: Soft. NT. ND.  Extremities : Trace BL pedal edema  Skin: per Ortho: incision healing well.   Neuro: Alert oriented.    Others: Stable mood.       Data   Recent Labs   Lab 12/15/22  0956 12/15/22  0819 12/14/22  2146 12/14/22  1225 12/13/22  0941 12/13/22  0911 12/12/22  0817 12/12/22  0612 12/11/22  2225 12/11/22 2033   WBC  --   --   --   --   --   --   --  8.5  --   --    HGB  --   --   --   --   --   --   --  9.6*  --  9.8*   MCV  --   --   --   --   --   --   --  93  --   --    PLT  --   --   --   --   --   --   --  421  --   --    NA  --   --   --   --   --  140  --  139  --   --    POTASSIUM  --   --   --   --   --  4.2  --  4.2  --   --    CHLORIDE  --   --   --   --   --  109  --  106  --   --    CO2  --   --   --   --   --  26  --  30  --   --    BUN  --   --   --   --   --  26  --  30  --   --    CR 1.11  --   --   --   --  1.26*  --  1.28*  --   --    ANIONGAP  --   --   --   --   --  5  --  3  --   --    MAIKOL  --   --   --   --   --  8.3*  --  8.3*  --   --    GLC  --  143* 141* 153*   < > 168*   < > 163*   < >  --     < > = values in this interval not displayed.     No results found for this or any previous visit (from the past 24 hour(s)).  Medications       apixaban ANTICOAGULANT  5 mg Oral BID     atorvastatin  40 mg Oral Daily     ciprofloxacin  500 mg Oral Q12H [facility] (08/20)     [Held by provider] furosemide  20 mg Oral Daily     insulin aspart  1-7 Units Subcutaneous TID AC     insulin aspart  1-5 Units Subcutaneous At Bedtime     lactobacillus rhamnosus (GG)  1 capsule Oral BID     metFORMIN  2,000 mg Oral QAM     metroNIDAZOLE  500 mg Oral TID     pioglitazone  45 mg Oral Daily     polyethylene glycol  17 g Oral Daily     senna-docusate  2 tablet Oral BID     sodium chloride (PF)  3 mL Intracatheter Q8H

## 2022-12-16 NOTE — PROGRESS NOTES
Care Management Follow Up    Length of Stay (days): 24    Expected Discharge Date:       Concerns to be Addressed:       Patient plan of care discussed at interdisciplinary rounds: Yes    Anticipated Discharge Disposition: Transitional Care     Anticipated Discharge Services:    Anticipated Discharge DME:  TBD    Patient/family educated on Medicare website which has current facility and service quality ratings:    Education Provided on the Discharge Plan:    Patient/Family in Agreement with the Plan:      Referrals Placed by CM/SW:    Private pay costs discussed: Not applicable    Additional Information:    Spoke with therapy they will be seeing him today at 11:30 pm.  Plan is to work on transfer with [facility] and pre standing exercises.     Met with Don in his room.  Explained that in order for him to be accepted at [facility]'s he really needs to be working consistently with Therapies.  He verbalized understanding.  Also agreed to have referrals sent to [facility].    Referrals sent to:  [facility]   [address] [address]  Contact Information   [phone]     [facility] -  Address  [address] [address]  Contact Information   [phone]     [facility] -   [address] [address]  Contact Information   [phone]     Pending:  [facility]      Sonya Avilez RN, BA  Care Coordinator  6 Med Surg  [phone], pager [phone]      For weekend social work needs, contact information below and available on Amcom:      Weekend [place] Pager: [phone]   Weekend 6MS, 8A, 10ICU- Pager: [phone]     For weekend RN care coordinator needs (home discharge with needs including home care, assisted living facility returns, durable medical equip, IV antibiotics) - page [phone].

## 2022-12-16 NOTE — PROGRESS NOTES
"[id number]    Plan of Care Reviewed With: Patient    Overall Patient Progress: No Change    Outcome Evaluation: Pt is A & O x4 on RA. C/O 5/10 L hip pain - pt states pain is tolerable & denies any intervention. Denies nausea, chest pain & SOB. Pt has L PIV - SL. LLE incision site approximated & adhesive strips CDI. Pt is bedbound, assist x2 w/ lift, voids spontaneously & uses call light appropriately.    BG    BG at 1635 was 135, per order parameters did not administer any units of insulin.    BG at 2217 was 173, per order parameters did not administer any units of insulin.     Shift Updates    Pt declined frequent repositioning throughout shift even after writer attempted multiple times & provided the pt w/ education of the importance and possible risks of not doing so.    Vitals: BP (!) 147/71 (BP Location: Right arm, Patient Position: Semi-Fuentes's)   Pulse 62   Temp 97.7  F (36.5  C) (Oral)   Resp 18   Ht 1.778 m (5' 10\")   Wt 147.1 kg (324 lb 4.8 oz)   SpO2 95%   BMI 46.53 kg/m      Plan: TBD, waiting placement. Continue with plan of care.       "

## 2022-12-16 NOTE — PROGRESS NOTES
"Orthopedic Surgery Progress Note: 12/16/2022    Subjective:   No acute events overnight. Tolerating a diet. No new concerns or complaints. Denies SOB, chest Pain, fevers, chills.    Still has a lot of pain, not getting out of bed    Objective:   BP (!) 141/68 (BP Location: Right arm, Patient Position: Semi-Fuentes's, Cuff Size: Adult Regular)   Pulse 64   Temp 98  F (36.7  C) (Oral)   Resp 16   Ht 1.778 m (5' 10\")   Wt 147.1 kg (324 lb 4.8 oz)   SpO2 95%   BMI 46.53 kg/m    No intake/output data recorded.  General: NAD. Resting comfortably in bed.  Respiratory: Breathing comfortably on RA.  Musculoskeletal:    Focused Exam of Left Lower Extremity  Incision healing well, Steri-Strips in place. Glue beneath.  Fires EHL, FHL, TA, GSC, Quad  SILT DP, SP, Saph, Sural, Tibial Nerve Distributions  1+ DP pulse, foot WWP. 1+ edema leg.     Drain removed on 12/6    Laboratory Data:  Lab Results   Component Value Date    WBC 8.5 12/12/2022    HGB 9.6 (L) 12/12/2022     12/12/2022         Assessment & Plan:   Waldo Bustos is a 72 year old male s/p Explantation of Chronic  Infected Left Total Hip Arthroplasty, Extended Trochanteric Osteotomy with Extensive Debridement and Reconstruction Using Antibiotic Cement Spacer on 11/22/2022 with Dr. Meyers.     12/16:   - Out of bed, to chair if patient allows.  Encouragement required.  - ID final recommendations in (see below), started on PO Abx on 12/1    Cultures: Proteus mirabilis    ----Infectious disease recommendations below----  -po ciprofloxacin 500mg BID and po metronidazole 500mg TID  Duration of total 6 weeks, from 11/22/2022.   -follow up with ID in 5 weeks, ideally ~1 week prior to completion of antibiotics      Activity: Up with assist and assistive devices as needed until independent.  Weight bearing status: NWB LLE  Antibiotics: Per ID, oral ciprofloxacin 500 mg bid and flagyl 500 mg TID   Diet: Begin with clear fluids and progress diet as tolerated. " Bowel regimen. Anti-emetics PRN.    DVT prophylaxis: Initially on  mg (postop), then Eliquis 2.5 mg BID x2 weeks after surgery (started on 11/28), NOW ON 5 mg BID PTA, initially held for anemia  Elevation: Elevate heels off of bed on pillows   Wound Care: Open to air.  Drains: 15f GUERRERO drain REMOVED on 12/6  Tran: Removed POD1  Pain management: Orals PRN, IV for breakthrough only  X-rays: AP Pelvis and lateral in PACU complete  Physical Therapy: Mobilization, ROM, ADL's  Occupational Therapy: ADL's  Labs: Trend Hgb on POD #1, 2  Consults: PT, OT. ID. Hospitalist, appreciate assistance in caring for this patient throughout the perioperative period-     Disposition: Pending progress with therapies, pain control on orals, and medical stability; anticipate discharge to likely TCU after Hgb stabilized. ID plan in place.     Orthopedic surgery staff for this patient is Dr. Meyers.    --------------------------------------  Damian Frank MD  PGY-4 Orthopaedic Surgery

## 2022-12-16 NOTE — PROGRESS NOTES
"  VS: BP (!) 141/68 (BP Location: Right arm, Patient Position: Semi-Fuentes's, Cuff Size: Adult Regular)   Pulse 64   Temp 98  F (36.7  C) (Oral)   Resp 16   Ht 1.778 m (5' 10\")   Wt 147.1 kg (324 lb 4.8 oz)   SpO2 95%   BMI 46.53 kg/m     O2: Stable on room air, denies chest pain and shortness of breath   Output: Voids spontaneously without difficulty into bedside urinal.    Last BM: 12/15/2022   Activity: Bedbound, assistx2 with ceiling lift.    Up for meals? No    Skin: L hip incision, redness in abdominal folds.    Pain: Pt stated he had pain 4/10 but denied intervention.    CMS: Intact, A/Ox4, denies numbness and tingling.    Dressing: Clean dry and intact.    Diet: Regular    LDA: L PIV- SL    Equipment: IV pump and pole, personal belongings, call light within reach    Plan: TBD    Additional Info: Pt refused skin assessment, repositioning, and allowing this writer to check to see if his bedding was dry this shift.          "

## 2022-12-16 NOTE — PROGRESS NOTES
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 Internal Medicine was consulted for post-operative medical management.     Areas Assessed:      Areas visualized during today's visit: Bilateral buttocks, skin folds    Patient is difficult to assess as he is not tolerating turning or repositioning due to pain. WOC was able to see patient with PT while he was getting up to chair in the sling. Buttocks and perineum assessed and patient does have denuded skin on bilateral buttocks as well as a large amount of stool. Of note, incontinent pad under patient was saturated with urine as patient is incontinent and refusing to turn to get cleaned up. WO was not able to appreciate any pressure injury to sacrum or coccyx or buttocks on assessment today.     Patient does have redness in skin folds most likely due to moisture. See plan of care below.     Patient will need to be educated frequently on the importance of turning and repositioning in order to care for skin.     Treatment Plan:     Buttocks wound(s): Follow Adult Incontinence Policy:     Cleanse the area with Marily cleanse and protect, very gently with soft cloth.    Apply ostomy powder (#4473) on all open and denuded skin if present.    Apply thin layer of critic aid paste on top of it.    With repeat application, do not scrub the paste, only remove soiled paste and reapply.    If complete removal of paste is necessary use baby oil/mineral oil (#831162) and soft  wash cloth.    Ensure pt has Isaac-cushion (#353459) while sitting up in the chair.    Use only one Covidien pad in between mattress and pt. No brief while in bed.    Abdominal and groin skin folds: Wash daily with soap and water. Dry thoroughly. Dust skin with miconazole powder.    Orders: Written    RECOMMEND PRIMARY TEAM ORDER: Antifungal message sent to Hospitalist  Education provided: importance of repositioning and plan of care  Discussed plan of care with: Patient, Nurse and Physician  WOC nurse follow-up plan: weekly  Notify WOC if wound(s) deteriorate.  Nursing to notify the Provider(s) and re-consult the WOC Nurse if new skin concern.    DATA:     Current support surface: Standard  Standard gel/foam mattress (IsoFlex, Atmos air, etc)  Containment of urine/stool: Incontinent pad in bed  BMI: Body mass index is 46.53 kg/m .   Active diet order: Orders Placed This Encounter      Advance Diet as Tolerated: Regular Diet Adult      Diet     Output: I/O last 3 completed shifts:  In: -   Out: 100 [Urine:100]     Labs: Recent Labs   Lab 12/12/22  0612   HGB 9.6*   WBC 8.5     Pressure injury risk assessment:   Sensory Perception: 4-->no impairment  Moisture: 3-->occasionally moist  Activity: 1-->bedfast  Mobility: 2-->very limited  Nutrition: 3-->adequate  Friction and Shear: 2-->potential problem  Rafael Score: 15    Kristen Hadley RN CWOCN  Dept. Pager: [phone]  Dept. Office Number: [phone]

## 2022-12-16 NOTE — PLAN OF CARE
Goal Outcome Evaluation:      Plan of Care Reviewed With: patient    Overall Patient Progress: improvingOverall Patient Progress: improving    VS Vital signs:Temp: 97.7  F (36.5  C) Temp src: Oral BP: (!) 144/65 Pulse: 60   Resp: 14 SpO2: 90 % O2 Device: None (Room air) .   Output:     Bowels is audible in all four quadrants. Pt is incontinent both bowel and bladder. LBM 12/15/2022.   Activity: Assist x2, pt declined frequency repositioning. Pt transferred to/from chair/bed x 1 with PT and  NST.   Skin: Redness on abdominal folds,groin area and buttock, skin barrier applied.   Pain:     Pt complained generalized pain, and managed with PRN oxycodone.   CMS:   Pt has generalized weakness.   Dressing: Lt hip incisional dressing in dry,clean and intact and wound edges approximated.   Diet: Pt is on a regular diet but, pt did not eat food this shift. He said I don't want food.   LDA: PIV in the Lt arm is patent.   Equipment: Commode, IV pump.   Plan:     Pt is able to make needs known and the call light is within the pt's reach. Continue to monitor.    Additional Info:

## 2022-12-16 NOTE — PROGRESS NOTES
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 Internal Medicine was consulted for post-operative medical management.     Today's changes:   12/16/2022   No new concern  Encouraged patient to work with therapy, WOCN, let RN/aid reposition frequently per protocol.   Agrees with me and RN to work with PT, RN.   Aw placement.       # Chronically infected left total hip arthroplasty now s/p explantation + extended debridement/reconstruction with antibiotic cement spacer (11/22/22)  Wound culture from 11/22: Finegoldia, Proteus Mirabilis  -Antibiotics: Was on iv Vancomycin, and Zosyn initially. Now on Ciprofloxacin, and Metronidazole. Duration of total 6 weeks, from 11/22/2022.   -It appears that patient has made very limited progress post surgery  -Baseline EKG for monitoring with baseline QTc at 443  -Follow up with outpatient ID clinic, around 1 week prior to completion of antibiotics. Should see either Nikko Velasco, or Moncho  - Monitor for intolerance to meds, gabino ciprofloxacin.   - Defer management to Ortho     # Acute Encephalopathy: resolved. Non focal. Suspect toxic metabolic.   New onset of confusion. Patient cant seem to recall why he is here. No fevers   New meds started are Cipro and Metronidazole. Sparing use of Oxycodone. Labs largely within normal limits  - Resolved. Continue monitoring.          # Normocytic anemia (?anemia of chronic disease) with acute blood loss anemia  Pre-operatively had hgb of 9.9 on 11/14. Patient has received 4 units of prbc since admission.  Hemoglobin has since stabilized at 7.5. At 8.7 on 12/04/2022  - Transfuse if <7  - Outpatient w/u once acute illness resolves  - Continue to monitor while resuming eliquis today        # Acute hypoxic respiratory failure, resolved   Most largely d/t  hypoventilation with background resistive lung disease given body habitus. VBG unremarkable w/o hypercarbia.  Lungs largely clear to auscultation bilaterally. No oxygen at home.   - Incentive spirometry, monitor respiratory rate with continuous oximetry  - Supplement oxygen as needed.         # HALEY: Resolved  Likely pre-renal given volume loss; though patient appears hypervolemic  Mildly elevated creatinine on the higher side of his recent values  - Hold Furosemide  - Cr 1.26, monitor         # Constipation:  On Daria-Colace twice daily, MiraLAX daily, Lactulose 3 times daily, Milk of mag as needed  - Continue above regimen     # JAIR with obesity  home CPAP- see above     # DM2  PTA med list includes metformin, glipizide, pioglitazone, victoza  Was on Insulin Aspart correction, which has now been discontinued. Started back on Metformin( 2 gms daily) and Pioglitazone 45 mg   A1c 6.8 this admission  Blood glucose slightly on the higher, will monitor closely for now  - Continue current regimen  - Start Insulin Medium intensity correction      # CAD:  PTA on Atorvastatin, Aspirin  Aspirin has been held post op  - continue statin, hold aspirin until cleared by surgery team     # History of DVT/PE  Previously on DOAC (eliquis) but this was held perioperatively. Apixaban has been restarted now.   - Apixaban  2.5 mg bid x14 days and then eliquis 5 mg bid (starting 11/28)     # Venous insufficiency with stasis dermatitis:   Erythematous patches on bilateral lower extremities - left anterior lower leg and smaller area on right lower leg. Low suspicion for cellulitis and more consistent with chronic stasis dermatitis  - PTA med list includes furosemide resumed today  - compression  "stockings     # Lack of motivation  - depression screen negative    Trial of low dose ritalin (not home med)     # Respiratory acidosis-- intra-operative blood gases with high CO2 and low pH. Resolved.   # Hypocalcemia Resolved   # Hyperkalemia- mild and intermittent on the intra-op labs. Asymptomatic  -Resolved          Diet: Advance Diet as Tolerated: Regular Diet Adult  Diet    DVT Prophylaxis: DOAC  Tran Catheter: Not present  Central Lines: None  Cardiac Monitoring: None  Code Status: Full Code      Disposition Plan      TBD. Aw placement.     The patient's care was discussed with the Care Coordinator/, Patient, Primary team and RN.    Nnamdi Esquivel MD  Hospitalist Service, [address]  Securely message with the Vocera Web Console (learn more here)  Text page via [facility] Paging/Directory   Please see signed in provider for up to date coverage information      Clinically Significant Risk Factors              # Hypoalbuminemia: Lowest albumin = 2.1 g/dL at 12/4/2022 10:35 AM, will monitor as appropriate          # DMII: A1C = 6.8 % (Ref range: 0.0 - 5.6 %) within past 3 months   # Severe Obesity: Estimated body mass index is 46.53 kg/m  as calculated from the following:    Height as of this encounter: 1.778 m (5' 10\").    Weight as of this encounter: 147.1 kg (324 lb 4.8 oz).          ______________________________________________________________________    Interval History   Interval events reviewed.   States doing well  Denies fever or chills.   No cough or cp or sob.   No LH or dizziness.   No NV or pain abdomen.   No new sensory or motor complaint.   Pain manageable.     No other new or acute medical concern      Data reviewed today: I reviewed all medications, new labs and imaging results over the last 24 hours. I personally reviewed no images or EKG's today.    Physical Exam   Vital Signs: Temp: 97.7  F (36.5  C) Temp src: Oral BP: (!) " 144/65 Pulse: 60   Resp: 14 SpO2: 90 % O2 Device: None (Room air)    Weight: 324 lbs 4.75 oz    General Appearance: Awake, interactive, NAD  Respiratory: Normal work of breathing.  Cardiovascular: RRR   GI: Soft. NT. ND.  Extremities : Trace BL pedal edema  Skin: per Ortho: incision healing well.   Neuro: Alert oriented.    Others: Stable mood.       Data   Recent Labs   Lab 12/16/22  0822 12/16/22  0159 12/15/22  2217 12/15/22  1352 12/15/22  0956 12/13/22  0941 12/13/22  0911 12/12/22  0817 12/12/22  0612 12/11/22  2225 12/11/22 2033   WBC  --   --   --   --   --   --   --   --  8.5  --   --    HGB  --   --   --   --   --   --   --   --  9.6*  --  9.8*   MCV  --   --   --   --   --   --   --   --  93  --   --    PLT  --   --   --   --   --   --   --   --  421  --   --    NA  --   --   --   --   --   --  140  --  139  --   --    POTASSIUM  --   --   --   --   --   --  4.2  --  4.2  --   --    CHLORIDE  --   --   --   --   --   --  109  --  106  --   --    CO2  --   --   --   --   --   --  26  --  30  --   --    BUN  --   --   --   --   --   --  26  --  30  --   --    CR  --   --   --   --  1.11  --  1.26*  --  1.28*  --   --    ANIONGAP  --   --   --   --   --   --  5  --  3  --   --    MAIKOL  --   --   --   --   --   --  8.3*  --  8.3*  --   --    * 136* 173*   < >  --    < > 168*   < > 163*   < >  --     < > = values in this interval not displayed.     No results found for this or any previous visit (from the past 24 hour(s)).  Medications       apixaban ANTICOAGULANT  5 mg Oral BID     atorvastatin  40 mg Oral Daily     ciprofloxacin  500 mg Oral Q12H [facility] (08/20)     [Held by provider] furosemide  20 mg Oral Daily     insulin aspart  1-7 Units Subcutaneous TID AC     insulin aspart  1-5 Units Subcutaneous At Bedtime     lactobacillus rhamnosus (GG)  1 capsule Oral BID     metFORMIN  2,000 mg Oral QAM     metroNIDAZOLE  500 mg Oral TID     pioglitazone  45 mg Oral Daily     polyethylene glycol  17 g Oral  Daily     senna-docusate  2 tablet Oral BID     sodium chloride (PF)  3 mL Intracatheter Q8H

## 2022-12-16 NOTE — PROGRESS NOTES
SPIRITUAL HEALTH SERVICES Progress Note  [facility] ([facility]) 6B    Saw pt Waldo Bustos per Length of Stay/Initial (self referral).    Patient/Family Understanding of Illness and Goals of Care - Rahat had a hip replacement that became infected. His understanding is that he has another 6 or so months before his infection is healed. The farthest he has traveled is from his bed to his chair and he is hoping to work with PT more. However, it is very painful.     Distress and Loss - Rahat shared that this has been a difficult third surgery for hip replacement. Because of the infection and duration of stay, Rahat expressed he was feeling defeated but at times still has some strength and hope to keep going. He shared that it is hard because he feels like he's running around a small track and not really going anywhere.     Strengths, Coping, and Resources - When asked if he has anybody cheering/rooting for him, Rahat mentioned his long-time girlfriend and several friends.     Meaning, Beliefs, and Spirituality - Rahat shared that he grew up [employer] and then was attending a [employer]ist [employer]. Currently he doesn't identify with any specific Mandaen, so much as he wants others to connect with what is meaningful for them.     Plan of Care - I provided reflective listening. No other spiritual needs were identified at this time. I will provide a follow up visit next week.     Quincy Schneider MDiv  Chaplain Resident  Pager [phone]    * [facility] remains available 24/7 for emergent requests/referrals, either by having the switchboard page the on-call  or by entering an ASAP/STAT consult in Epic (this will also page the on-call ). Routine Epic consults receive an initial response within 24 hours.*

## 2022-12-17 NOTE — PLAN OF CARE
Goal Outcome Evaluation:      Plan of Care Reviewed With: patient    Overall Patient Progress: improvingOverall Patient Progress: improving    VS Vital signs:  Temp: 98.6  F (37  C) Temp src: Oral BP: 132/61 Pulse: 69   Resp: 16 SpO2: 97 % O2 Device: None (Room air).   Output:     Bowels is audible in all four quadrants. Pt is incontinent both bowel and bladder.   Activity: Assist x2, pt declined frequency repositioning.    Skin: Redness on abdominal folds,groin area and buttock, refused antifungal application.   Pain: Denied pain.   CMS:   Pt has generalized weakness.   Dressing: Lt hip incisional dressing in dry,clean and intact and wound edges approximated.   Diet: Pt is on a regular diet but, pt did not eat food this shift. He said I don't want food.   LDA: PIV in the Lt arm is patent.   Equipment: Commode, IV pump.   Plan:     Pt is able to make needs known and the call light is within the pt's reach. Continue to monitor.    Additional Info:      Pt did not wanted to be changed in this shift.

## 2022-12-17 NOTE — PROGRESS NOTES
"[id number]    Plan of Care Reviewed With: Patient    Overall Patient Progress: No Change    Outcome Evaluation: Pt is A & O x4 on RA. Denies pain, nausea, chest pain & SOB. Pt has L PIV - SL. LLE incision site approximated & adhesive strips CDI. Pt has incontinent loose BM - incontinent cares provided. Redness in skin folds & groin area noted upon skin assessment. Pt is bedbound, assist x2 w/ lift, voids spontaneously & uses call light appropriately.     BG    BG at 1738 was 129, per orders did not administer any units of insulin.    BG at 2207 was 187, per orders did not administer any units of insulin.     Shift Updates     Pt declined frequent repositioning throughout shift even after writer attempted multiple times & provided the pt w/ education on the importance and possible risks of not doing so.     Per day shift report, pt declined eating breakfast & lunch; however, pt ordered a sandwich for dinner.     Vitals: BP (!) 152/82 (BP Location: Right arm, Patient Position: Supine, Cuff Size: Adult Regular)   Pulse 63   Temp 98.3  F (36.8  C) (Oral)   Resp 16   Ht 1.778 m (5' 10\")   Wt 147.1 kg (324 lb 4.8 oz)   SpO2 95%   BMI 46.53 kg/m      Plan: TBD, awaiting placement. Continue with plan of care.   "

## 2022-12-17 NOTE — PROGRESS NOTES
"Orthopedic Surgery Progress Note: 12/17/2022    Subjective:   No acute events overnight. Tolerating a diet. No new concerns or complaints. Denies SOB, chest Pain, fevers, chills.    Got up to chair with lift yesterday.  I congratulated him on this.    Objective:   BP (!) 152/82 (BP Location: Right arm, Patient Position: Supine, Cuff Size: Adult Regular)   Pulse 63   Temp 98.3  F (36.8  C) (Oral)   Resp 16   Ht 1.778 m (5' 10\")   Wt 147.1 kg (324 lb 4.8 oz)   SpO2 95%   BMI 46.53 kg/m    No intake/output data recorded.  General: NAD. Resting comfortably in bed.  Respiratory: Breathing comfortably on RA.  Musculoskeletal:    Focused Exam of Left Lower Extremity  Incision healing well, Steri-Strips in place. Glue beneath.  Fires EHL, FHL, TA, GSC, Quad  SILT DP, SP, Saph, Sural, Tibial Nerve Distributions  1+ DP pulse, foot WWP. 1+ edema leg.     Drain removed on 12/6    Laboratory Data:  Lab Results   Component Value Date    WBC 8.5 12/12/2022    HGB 9.6 (L) 12/12/2022     12/12/2022         Assessment & Plan:   Waldo Bustos is a 72 year old male s/p Explantation of Chronic  Infected Left Total Hip Arthroplasty, Extended Trochanteric Osteotomy with Extensive Debridement and Reconstruction Using Antibiotic Cement Spacer on 11/22/2022 with Dr. Meyers.     12/17:   - Out of bed, to chair daily  - ID final recommendations in (see below), started on PO Abx on 12/1    Cultures: Proteus mirabilis    ----Infectious disease recommendations below----  -po ciprofloxacin 500mg BID and po metronidazole 500mg TID  Duration of total 6 weeks, from 11/22/2022.   -follow up with ID in 5 weeks, ideally ~1 week prior to completion of antibiotics      Activity: Up with assist and assistive devices as needed until independent.  Weight bearing status: NWB LLE  Antibiotics: Per ID, oral ciprofloxacin 500 mg bid and flagyl 500 mg TID   Diet: Begin with clear fluids and progress diet as tolerated. Bowel regimen. Anti-emetics " PRN.    DVT prophylaxis: Initially on  mg (postop), then Eliquis 2.5 mg BID x2 weeks after surgery (started on 11/28), NOW ON 5 mg BID PTA, initially held for anemia  Elevation: Elevate heels off of bed on pillows   Wound Care: Open to air.  Drains: 15f GUERRERO drain REMOVED on 12/6  Tran: Removed POD1  Pain management: Orals PRN, IV for breakthrough only  X-rays: AP Pelvis and lateral in PACU complete  Physical Therapy: Mobilization, ROM, ADL's  Occupational Therapy: ADL's  Labs: Trend Hgb on POD #1, 2  Consults: PT, OT. ID. Hospitalist, appreciate assistance in caring for this patient throughout the perioperative period-     Disposition: Pending progress with therapies, pain control on orals, and medical stability; anticipate discharge to likely TCU after Hgb stabilized. ID plan in place.     Orthopedic surgery staff for this patient is Dr. Meyers.    --------------------------------------  Damian Frank MD  PGY-4 Orthopaedic Surgery

## 2022-12-17 NOTE — PROGRESS NOTES
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 Internal Medicine was consulted for post-operative medical management.     Today's changes:   12/17/2022    No new concern  Aw placement.   Encouraged to continue to work with therapy  Ordered miconazole powder for groins, abdominal folds.     # Chronically infected left total hip arthroplasty now s/p explantation + extended debridement/reconstruction with antibiotic cement spacer (11/22/22)  Wound culture from 11/22: Finegoldia, Proteus Mirabilis  -Antibiotics: Was on iv Vancomycin, and Zosyn initially. Now on Ciprofloxacin, and Metronidazole. Duration of total 6 weeks, from 11/22/2022.   -Slow post op recovery, progress with therapy.   -Baseline EKG for monitoring with baseline QTc at 443  -Follow up with outpatient ID clinic, around 1 week prior to completion of antibiotics. Should see either Tracy Mcqueen, Nikko, or Moncho  - Defer further management to Ortho    - Activity: Up with assist and assistive devices as needed until independent.  - Weight bearing status: NWB LLE    # Acute Encephalopathy: resolved. Non focal. Suspect toxic metabolic.   Had new onset of confusion. Patient cant seem to recall why he is here. No fevers   New meds started are Cipro and Metronidazole. Sparing use of Oxycodone. Labs largely within normal limits  - Resolved. Continue monitoring.   - Delirium precautions.          # Normocytic anemia (?anemia of chronic disease) with acute blood loss anemia  Pre-operatively had hgb of 9.9 on 11/14. Patient has received 4  units of prbc since admission. Hemoglobin has since stabilized ~9.0  - Transfuse if <7  - Outpatient w/u once acute illness resolves  - Continue to monitor while on eliquis.         # Acute hypoxic respiratory failure, resolved   Most largely d/t  hypoventilation with background resistive lung disease given body habitus. VBG unremarkable w/o hypercarbia.  Lungs largely clear to auscultation bilaterally. No oxygen at home.   - Incentive spirometry, monitor respiratory rate with continuous oximetry  - Supplement oxygen as needed.         # HALEY: Resolved  Likely pre-renal given volume loss; though patient appears hypervolemic  Mildly elevated creatinine on the higher side of his recent values  Cr now 1.1  - resume furosemide 20 mg daily. 12/17  - Monitor BMP         # Constipation:  On Daria-Colace twice daily, MiraLAX daily, Lactulose 3 times daily, Milk of mag as needed  - Continue above regimen     # JAIR with obesity  - home CPAP     # DM2  PTA med list includes metformin, glipizide, pioglitazone, victoza  Was on Insulin Aspart correction, which has now been discontinued. Started back on Metformin( 2 gms daily) and Pioglitazone 45 mg   A1c 6.8 this admission  - Continue current regimen  - Insulin Medium intensity correction    Recent Labs   Lab 12/17/22  0801 12/16/22  2207 12/16/22  1738 12/16/22  1323 12/16/22  0822 12/16/22  0159   * 187* 129* 145* 141* 136*         # CAD:  PTA on Atorvastatin, Aspirin  Aspirin has been held post op  - continue statin, RESUME aspirin when cleared by surgery team     # History of DVT/PE  Previously on DOAC (eliquis) but this was held perioperatively.  Initially on  mg (postop), then Eliquis 2.5 mg BID x2 weeks after surgery (started on 11/28), NOW ON 5 mg BID PTA, initially held for anemia  - Ct apixaban.    # Venous insufficiency with stasis dermatitis:   Erythematous patches on bilateral lower extremities - left anterior lower leg and smaller area on right lower  "leg. Low suspicion for cellulitis and more consistent with chronic stasis dermatitis  - PTA med list includes furosemide resumed today  - compression stockings     # Lack of motivation  - depression screen negative    Trial of low dose ritalin (not home med)     # Respiratory acidosis-- intra-operative blood gases with high CO2 and low pH. Resolved.   # Hypocalcemia Resolved   # Hyperkalemia- mild and intermittent on the intra-op labs. Asymptomatic  -Resolved          Diet: Advance Diet as Tolerated: Regular Diet Adult  Diet    DVT Prophylaxis: DOAC  Tran Catheter: Not present  Central Lines: None  Cardiac Monitoring: None  Code Status: Full Code      Disposition Plan      TBD. Aw placement. * family at bedside. Updated.     The patient's care was discussed with the Care Coordinator/, Patient and RN.    Nnamdi Esquivel MD  Hospitalist Service, [address]  Securely message with the Vocera Web Console (learn more here)  Text page via [facility] Paging/Directory   Please see signed in provider for up to date coverage information      Clinically Significant Risk Factors              # Hypoalbuminemia: Lowest albumin = 2.1 g/dL at 12/4/2022 10:35 AM, will monitor as appropriate          # DMII: A1C = 6.8 % (Ref range: 0.0 - 5.6 %) within past 3 months   # Severe Obesity: Estimated body mass index is 46.53 kg/m  as calculated from the following:    Height as of this encounter: 1.778 m (5' 10\").    Weight as of this encounter: 147.1 kg (324 lb 4.8 oz).          ______________________________________________________________________    Interval History   Interval events reviewed.   States doing well  Denies fever or chills.   No cough or cp or sob.   No LH or dizziness.   No NV or pain abdomen.   No new sensory or motor complaint.   Pain controlled.  Physical deconditioning.     No other new or acute medical concern      Data reviewed today: I reviewed all " medications, new labs and imaging results over the last 24 hours. I personally reviewed no images or EKG's today.    Physical Exam   Vital Signs: Temp: 98.6  F (37  C) Temp src: Oral BP: 132/61 Pulse: 69   Resp: 16 SpO2: 97 % O2 Device: None (Room air)    Weight: 324 lbs 4.75 oz    General Appearance: Awake, interactive, NAD  Respiratory: Normal work of breathing.  Cardiovascular: RRR   GI: Soft. NT. ND.  Extremities : Trace BL pedal edema  Skin: per Ortho: incision healing well.   Neuro: Alert oriented.    Others: Stable mood.       Data   Recent Labs   Lab 12/17/22  0801 12/16/22  2207 12/16/22  1738 12/15/22  1352 12/15/22  0956 12/13/22  0941 12/13/22  0911 12/12/22  0817 12/12/22  0612 12/11/22  2225 12/11/22  2033   WBC  --   --   --   --   --   --   --   --  8.5  --   --    HGB  --   --   --   --   --   --   --   --  9.6*  --  9.8*   MCV  --   --   --   --   --   --   --   --  93  --   --    PLT  --   --   --   --   --   --   --   --  421  --   --    NA  --   --   --   --   --   --  140  --  139  --   --    POTASSIUM  --   --   --   --   --   --  4.2  --  4.2  --   --    CHLORIDE  --   --   --   --   --   --  109  --  106  --   --    CO2  --   --   --   --   --   --  26  --  30  --   --    BUN  --   --   --   --   --   --  26  --  30  --   --    CR  --   --   --   --  1.11  --  1.26*  --  1.28*  --   --    ANIONGAP  --   --   --   --   --   --  5  --  3  --   --    MAIKOL  --   --   --   --   --   --  8.3*  --  8.3*  --   --    * 187* 129*   < >  --    < > 168*   < > 163*   < >  --     < > = values in this interval not displayed.     No results found for this or any previous visit (from the past 24 hour(s)).  Medications       apixaban ANTICOAGULANT  5 mg Oral BID     atorvastatin  40 mg Oral Daily     ciprofloxacin  500 mg Oral Q12H [facility] (08/20)     [Held by provider] furosemide  20 mg Oral Daily     insulin aspart  1-7 Units Subcutaneous TID AC     insulin aspart  1-5 Units Subcutaneous At Bedtime      lactobacillus rhamnosus (GG)  1 capsule Oral BID     metFORMIN  2,000 mg Oral QAM     metroNIDAZOLE  500 mg Oral TID     miconazole   Topical BID     pioglitazone  45 mg Oral Daily     polyethylene glycol  17 g Oral Daily     senna-docusate  2 tablet Oral BID     sodium chloride (PF)  3 mL Intracatheter Q8H

## 2022-12-18 NOTE — PROGRESS NOTES
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 Internal Medicine was consulted for post-operative medical management.     Today's changes:   12/18/2022    Doing well  Patient's wife concerned about renal failure. Reviewed last labs with the patient, last Cr 1.1 almost at baseline. Non oliguric.   Will check bmp, cbc today.   Hold lasix for now. Does not appear hypervolemic on exam.   Pt motivated to work with therapy.   Aw placement.       # Chronically infected left total hip arthroplasty now s/p explantation + extended debridement/reconstruction with antibiotic cement spacer (11/22/22)  Wound culture from 11/22: Finegoldia, Proteus Mirabilis  -Antibiotics: Was on iv Vancomycin, and Zosyn initially. Now on Ciprofloxacin, and Metronidazole. Duration of total 6 weeks, from 11/22/2022.   -Slow post op recovery, progress with therapy.   -Baseline EKG for monitoring with baseline QTc at 443  -Follow up with outpatient ID clinic, around 1 week prior to completion of antibiotics. Should see either Nikko Velasco, or Moncho  - Defer further management to Ortho    - Activity: Up with assist and assistive devices as needed until independent.  - Weight bearing status: NWB LLE    # Acute Encephalopathy: resolved. Non focal. Suspect toxic metabolic.   Had new onset of confusion. Patient cant seem to recall why he is here. No fevers   New meds started are Cipro and Metronidazole. Sparing use of Oxycodone. Labs largely within normal limits  - Resolved. Continue monitoring.   - Delirium precautions.           # Normocytic anemia (?anemia of chronic disease) with acute blood loss anemia  Pre-operatively had hgb of 9.9 on 11/14. Patient has received 4 units of prbc since admission. Hemoglobin has since stabilized ~9.0  - Transfuse if <7  - Outpatient w/u once acute illness resolves  - Continue to monitor while on eliquis.         # Acute hypoxic respiratory failure, resolved   Most largely d/t  hypoventilation with background resistive lung disease given body habitus. VBG unremarkable w/o hypercarbia.  Lungs largely clear to auscultation bilaterally. No oxygen at home.   - Incentive spirometry, monitor respiratory rate with continuous oximetry  - Supplement oxygen as needed.         # HALEY: Resolved  Likely pre-renal given volume loss; though patient appears hypervolemic  Mildly elevated creatinine on the higher side of his recent values  Cr now 1.1  - resume furosemide 20 mg daily. 12/17  - Monitor BMP         # Constipation:  On Daria-Colace twice daily, MiraLAX daily, Lactulose 3 times daily, Milk of mag as needed  - Continue above regimen     # JAIR with obesity  - home CPAP     # DM2  PTA med list includes metformin, glipizide, pioglitazone, victoza  Was on Insulin Aspart correction, which has now been discontinued. Started back on Metformin( 2 gms daily) and Pioglitazone 45 mg   A1c 6.8 this admission  - Continue current regimen  - Insulin Medium intensity correction    Recent Labs   Lab 12/17/22  2111 12/17/22  1710 12/17/22  1413 12/17/22  0801 12/16/22  2207 12/16/22  1738   * 145* 160* 144* 187* 129*         # CAD:  PTA on Atorvastatin, Aspirin  Aspirin has been held post op  - continue statin, RESUME aspirin when cleared by surgery team     # History of DVT/PE  Previously on DOAC (eliquis) but this was held perioperatively.  Initially on  mg (postop), then Eliquis 2.5 mg BID x2 weeks after surgery (started on 11/28), NOW ON 5 mg BID PTA, initially held for anemia  - Ct apixaban.    # Venous  "insufficiency with stasis dermatitis:   Erythematous patches on bilateral lower extremities - left anterior lower leg and smaller area on right lower leg. Low suspicion for cellulitis and more consistent with chronic stasis dermatitis  - PTA med list includes furosemide resumed today  - compression stockings     # Lack of motivation  - depression screen negative    Trial of low dose ritalin (not home med)     # Respiratory acidosis-- intra-operative blood gases with high CO2 and low pH. Resolved.   # Hypocalcemia Resolved   # Hyperkalemia- mild and intermittent on the intra-op labs. Asymptomatic  -Resolved     # Groins, abd folds, buttocks: redness:   WOCN consultation  Miconazole powder.   care per WOCN       Diet: Advance Diet as Tolerated: Regular Diet Adult  Diet    DVT Prophylaxis: DOAC  Tran Catheter: Not present  Central Lines: None  Cardiac Monitoring: None  Code Status: Full Code      Disposition Plan      TBD. Aw placement. * family at bedside. Updated.     The patient's care was discussed with the Care Coordinator/, Patient and RN.    Nnamdi Esquivel MD  Hospitalist Service, [address]  Securely message with the Vocera Web Console (learn more here)  Text page via [facility] Paging/Directory   Please see signed in provider for up to date coverage information      Clinically Significant Risk Factors              # Hypoalbuminemia: Lowest albumin = 2.1 g/dL at 12/4/2022 10:35 AM, will monitor as appropriate          # DMII: A1C = 6.8 % (Ref range: 0.0 - 5.6 %) within past 3 months   # Severe Obesity: Estimated body mass index is 46.53 kg/m  as calculated from the following:    Height as of this encounter: 1.778 m (5' 10\").    Weight as of this encounter: 147.1 kg (324 lb 4.8 oz).          ______________________________________________________________________    Interval History   Interval events reviewed.   States doing well  Denies fever " or chills.   No cough or cp or sob.   No LH or dizziness.   No NV or pain abdomen.   No new sensory or motor complaint.   Pain controlled.  Physical deconditioning.     No other new or acute medical concern      Data reviewed today: I reviewed all medications, new labs and imaging results over the last 24 hours. I personally reviewed no images or EKG's today.    Physical Exam   Vital Signs: Temp: 97.8  F (36.6  C) Temp src: Oral BP: (!) 152/74 Pulse: 60   Resp: 16 SpO2: 95 % O2 Device: None (Room air)    Weight: 324 lbs 4.75 oz    General Appearance: Awake, interactive, NAD  Respiratory: Normal work of breathing.  Cardiovascular: RRR   GI: Soft. NT. ND.  Extremities : Trace BL pedal edema  Skin: per Ortho: incision healing well.   Neuro: Alert oriented.    Others: Stable mood.       Data   Recent Labs   Lab 12/17/22  2111 12/17/22  1710 12/17/22  1413 12/15/22  1352 12/15/22  0956 12/13/22  0941 12/13/22  0911 12/12/22  0817 12/12/22  0612 12/11/22  2225 12/11/22  2033   WBC  --   --   --   --   --   --   --   --  8.5  --   --    HGB  --   --   --   --   --   --   --   --  9.6*  --  9.8*   MCV  --   --   --   --   --   --   --   --  93  --   --    PLT  --   --   --   --   --   --   --   --  421  --   --    NA  --   --   --   --   --   --  140  --  139  --   --    POTASSIUM  --   --   --   --   --   --  4.2  --  4.2  --   --    CHLORIDE  --   --   --   --   --   --  109  --  106  --   --    CO2  --   --   --   --   --   --  26  --  30  --   --    BUN  --   --   --   --   --   --  26  --  30  --   --    CR  --   --   --   --  1.11  --  1.26*  --  1.28*  --   --    ANIONGAP  --   --   --   --   --   --  5  --  3  --   --    MAIKOL  --   --   --   --   --   --  8.3*  --  8.3*  --   --    * 145* 160*   < >  --    < > 168*   < > 163*   < >  --     < > = values in this interval not displayed.     No results found for this or any previous visit (from the past 24 hour(s)).  Medications       apixaban ANTICOAGULANT  5 mg  Oral BID     atorvastatin  40 mg Oral Daily     ciprofloxacin  500 mg Oral Q12H [facility] (08/20)     [Held by provider] furosemide  20 mg Oral Daily     insulin aspart  1-7 Units Subcutaneous TID AC     insulin aspart  1-5 Units Subcutaneous At Bedtime     lactobacillus rhamnosus (GG)  1 capsule Oral BID     metFORMIN  2,000 mg Oral QAM     metroNIDAZOLE  500 mg Oral TID     miconazole   Topical BID     pioglitazone  45 mg Oral Daily     polyethylene glycol  17 g Oral Daily     senna-docusate  2 tablet Oral BID     sodium chloride (PF)  3 mL Intracatheter Q8H

## 2022-12-18 NOTE — PROGRESS NOTES
"[id number]    Plan of Care Reviewed With: Patient    Overall Patient Progress: No Change    Outcome Evaluation: Pt is A & O x4 on RA. C/O 9/10 L hip pain - administered PRN Roxicodone & Tylenol. Denies nausea, chest pain & SOB. Pt has L PIV - SL. LLE incision site approximated & adhesive strips CDI. Redness in skin folds & groin area noted upon skin assessment - pt refused scheduled micatin powder. Pt did not get oob during shift, voids spontaneously & uses call light appropriately.     BG    BG at 1710 was 145; however, pt declined eating anything for dinner so writer did not administer any insulin.     BG at 2111 was 147, per orders did not administer any insulin.     Shift Updates    Pt declined frequent repositioning throughout shift even after writer attempted multiple times & provided the pt w/ education on the importance and possible risks of not doing so.     Pt declined eating dinner - has barely been eating/drinking lately. Writer offered pt food, drinks & brought water into pt's room various times throughout shift.     CPAP in place at 2235.    Vitals: BP (!) 152/74 (BP Location: Right arm, Patient Position: Semi-Fuentes's, Cuff Size: Adult Regular)   Pulse 60   Temp 97.8  F (36.6  C) (Oral)   Resp 16   Ht 1.778 m (5' 10\")   Wt 147.1 kg (324 lb 4.8 oz)   SpO2 95%   BMI 46.53 kg/m      Plan: TBD, awaiting placement. Continue with plan of care.   "

## 2022-12-18 NOTE — PROGRESS NOTES
"Orthopedic Surgery Progress Note: 12/18/2022    Subjective:   No acute events overnight. Tolerating a diet. No new concerns or complaints. Denies SOB, chest Pain, fevers, chills.      Objective:   BP (!) 152/74 (BP Location: Right arm, Patient Position: Semi-Fuentes's, Cuff Size: Adult Regular)   Pulse 60   Temp 97.8  F (36.6  C) (Oral)   Resp 16   Ht 1.778 m (5' 10\")   Wt 147.1 kg (324 lb 4.8 oz)   SpO2 95%   BMI 46.53 kg/m    No intake/output data recorded.  General: NAD. Resting comfortably in bed.  Respiratory: Breathing comfortably on RA.  Musculoskeletal:    Focused Exam of Left Lower Extremity  Incision healing well, Steri-Strips in place. Glue beneath.  Fires EHL, FHL, TA, GSC, Quad  SILT DP, SP, Saph, Sural, Tibial Nerve Distributions  1+ DP pulse, foot WWP. 1+ edema leg.     Drain removed on 12/6    Laboratory Data:  Lab Results   Component Value Date    WBC 9.1 12/18/2022    HGB 9.7 (L) 12/18/2022     12/18/2022         Assessment & Plan:   Waldo Bustos is a 72 year old male s/p Explantation of Chronic  Infected Left Total Hip Arthroplasty, Extended Trochanteric Osteotomy with Extensive Debridement and Reconstruction Using Antibiotic Cement Spacer on 11/22/2022 with Dr. Meyers.     12/18:   - Out of bed, to chair daily  - ID final recommendations in (see below), started on PO Abx on 12/1    Cultures: Proteus mirabilis    ----Infectious disease recommendations below----  -po ciprofloxacin 500mg BID and po metronidazole 500mg TID  Duration of total 6 weeks, from 11/22/2022.   -follow up with ID in 5 weeks, ideally ~1 week prior to completion of antibiotics      Activity: Up with assist and assistive devices as needed until independent.  Weight bearing status: NWB LLE  Antibiotics: Per ID, oral ciprofloxacin 500 mg bid and flagyl 500 mg TID   Diet: Begin with clear fluids and progress diet as tolerated. Bowel regimen. Anti-emetics PRN.    DVT prophylaxis: Initially on  mg (postop), " then Eliquis 2.5 mg BID x2 weeks after surgery (started on 11/28), NOW ON 5 mg BID PTA, initially held for anemia  Elevation: Elevate heels off of bed on pillows   Wound Care: Open to air.  Drains: 15f GUERRERO drain REMOVED on 12/6  Tran: Removed POD1  Pain management: Orals PRN, IV for breakthrough only  X-rays: AP Pelvis and lateral in PACU complete  Physical Therapy: Mobilization, ROM, ADL's  Occupational Therapy: ADL's  Labs: Trend Hgb on POD #1, 2  Consults: PT, OT. ID. Hospitalist, appreciate assistance in caring for this patient throughout the perioperative period-     Disposition: Pending progress with therapies, pain control on orals, and medical stability; anticipate discharge to likely TCU after Hgb stabilized. ID plan in place.     Orthopedic surgery staff for this patient is Dr. Meyers.    --------------------------------------  Damian Frank MD  PGY-4 Orthopaedic Surgery

## 2022-12-18 NOTE — PLAN OF CARE
Pt A&O x4. A of 2 w/ lift. Uses urinal at bedside. Able to have a large, loose BM. Pain managed w/ PRN Oxy. L hip incision WDL and BRII. Able to get up into chair in afternoon. Continue w/ plan of care.

## 2022-12-19 NOTE — PROGRESS NOTES
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 Internal Medicine was consulted for post-operative medical management.     Today's changes:   12/19/2022    Doing well  No new concern.   Patient currently motivated to work with therapy.   Aw placement.       # Chronically infected left total hip arthroplasty now s/p explantation + extended debridement/reconstruction with antibiotic cement spacer (11/22/22)  Wound culture from 11/22: Finegoldia, Proteus Mirabilis  -Antibiotics: Was on iv Vancomycin, and Zosyn initially. Now on Ciprofloxacin, and Metronidazole. Duration of total 6 weeks, from 11/22/2022.   -Slow post op recovery, progress with therapy.   -Baseline EKG for monitoring with baseline QTc at 443  -Follow up with outpatient ID clinic, around 1 week prior to completion of antibiotics. Should see either Tracy Mcqueen, Nikko, or Moncho  - Defer further management to Ortho    - Activity: Up with assist and assistive devices as needed until independent.  - Weight bearing status: NWB LLE    # Acute Encephalopathy: resolved. Non focal. Suspect toxic metabolic.   Had new onset of confusion. Patient cant seem to recall why he is here. No fevers   New meds started are Cipro and Metronidazole. Sparing use of Oxycodone. Labs largely within normal limits  - Resolved. Continue monitoring.   - Delirium precautions.          # Normocytic anemia (?anemia of chronic disease) with acute blood loss anemia  Pre-operatively had hgb of 9.9 on 11/14. Patient has received 4 units of prbc since admission.  Hemoglobin has since stabilized ~9.0  - Transfuse if <7  - Outpatient w/u once acute illness resolves  - Continue to monitor while on eliquis.         # Acute hypoxic respiratory failure, resolved   Most largely d/t  hypoventilation with background resistive lung disease given body habitus. VBG unremarkable w/o hypercarbia.  Lungs largely clear to auscultation bilaterally. No oxygen at home.   - Incentive spirometry, monitor respiratory rate with continuous oximetry  - Supplement oxygen as needed.         # HALEY: Resolved  Likely pre-renal given volume loss; though patient appears hypervolemic  Mildly elevated creatinine on the higher side of his recent values  Cr now 1.1  - resume furosemide 20 mg daily. 12/17  - Monitor BMP         # Constipation:  On Daria-Colace twice daily, MiraLAX daily, Lactulose 3 times daily, Milk of mag as needed  - Continue above regimen     # JAIR with obesity  - home CPAP     # DM2  PTA med list includes metformin, glipizide, pioglitazone, victoza  Was on Insulin Aspart correction, which has now been discontinued. Started back on Metformin( 2 gms daily) and Pioglitazone 45 mg   A1c 6.8 this admission  - Continue current regimen  - Insulin Medium intensity correction    Recent Labs   Lab 12/19/22  0859 12/18/22  2225 12/18/22  1717 12/18/22  1300 12/18/22  0708 12/17/22  2111   * 230* 131* 142* 124* 147*         # CAD:  PTA on Atorvastatin, Aspirin  Aspirin has been held post op  - continue statin, RESUME aspirin when cleared by surgery team     # History of DVT/PE  Previously on DOAC (eliquis) but this was held perioperatively.  Initially on  mg (postop), then Eliquis 2.5 mg BID x2 weeks after surgery (started on 11/28), NOW ON 5 mg BID PTA, initially held for anemia  - Ct apixaban.    # Venous insufficiency with stasis dermatitis:   Erythematous patches on bilateral lower extremities - left anterior lower leg and smaller area on right lower leg. Low suspicion for cellulitis  "and more consistent with chronic stasis dermatitis  - PTA med list includes furosemide resumed today  - compression stockings     # Lack of motivation  - depression screen negative    Trial of low dose ritalin (not home med)     # Respiratory acidosis-- intra-operative blood gases with high CO2 and low pH. Resolved.   # Hypocalcemia Resolved   # Hyperkalemia- mild and intermittent on the intra-op labs. Asymptomatic  -Resolved     # Groins, abd folds, buttocks: redness:   WOCN consultation  Miconazole powder.   care per WOCN       Diet: Advance Diet as Tolerated: Regular Diet Adult  Diet    DVT Prophylaxis: DOAC  Tran Catheter: Not present  Central Lines: None  Cardiac Monitoring: None  Code Status: Full Code      Disposition Plan      TBD. Aw placement. * family at bedside. Updated.     The patient's care was discussed with the Care Coordinator/, Patient and RN.    Nnamdi Esquivel MD  Hospitalist Service, [address]  Securely message with the Vocera Web Console (learn more here)  Text page via [facility] Paging/Directory   Please see signed in provider for up to date coverage information      Clinically Significant Risk Factors              # Hypoalbuminemia: Lowest albumin = 2.1 g/dL at 12/4/2022 10:35 AM, will monitor as appropriate          # DMII: A1C = 6.8 % (Ref range: 0.0 - 5.6 %) within past 3 months   # Severe Obesity: Estimated body mass index is 46.53 kg/m  as calculated from the following:    Height as of this encounter: 1.778 m (5' 10\").    Weight as of this encounter: 147.1 kg (324 lb 4.8 oz).          ______________________________________________________________________    Interval History   Interval events reviewed.   States doing well  Denies fever or chills.   No cough or cp or sob.   No LH or dizziness.   No NV or pain abdomen.   No new sensory or motor complaint.   Pain controlled.  Physical deconditioning.     No other new or " acute medical concern      Data reviewed today: I reviewed all medications, new labs and imaging results over the last 24 hours. I personally reviewed no images or EKG's today.    Physical Exam   Vital Signs: Temp: 97.8  F (36.6  C) Temp src: Oral BP: (!) 140/69 Pulse: 62   Resp: 18 SpO2: 95 % O2 Device: None (Room air)    Weight: 324 lbs 4.75 oz    General Appearance: Awake, interactive, NAD  Respiratory: Normal work of breathing.  Cardiovascular: RRR   GI: Soft. NT. ND.  Extremities : Trace BL pedal edema  Skin: per Ortho: incision healing well.   Neuro: Alert oriented.    Others: Stable mood.       Data   Recent Labs   Lab 12/19/22  0859 12/18/22  2225 12/18/22  1717 12/18/22  1300 12/18/22  0708 12/15/22  1352 12/15/22  0956 12/13/22  0941 12/13/22  0911   WBC  --   --   --   --  9.1  --   --   --   --    HGB  --   --   --   --  9.7*  --   --   --   --    MCV  --   --   --   --  94  --   --   --   --    PLT  --   --   --   --  344  --   --   --   --    NA  --   --   --   --  138  --   --   --  140   POTASSIUM  --   --   --   --  4.3  --   --   --  4.2   CHLORIDE  --   --   --   --  107  --   --   --  109   CO2  --   --   --   --  28  --   --   --  26   BUN  --   --   --   --  21  --   --   --  26   CR  --   --   --   --  1.19  --  1.11  --  1.26*   ANIONGAP  --   --   --   --  3  --   --   --  5   MAIKOL  --   --   --   --  8.4*  --   --   --  8.3*   * 230* 131*   < > 124*   < >  --    < > 168*    < > = values in this interval not displayed.     No results found for this or any previous visit (from the past 24 hour(s)).  Medications       apixaban ANTICOAGULANT  5 mg Oral BID     atorvastatin  40 mg Oral Daily     ciprofloxacin  500 mg Oral Q12H [facility] (08/20)     [Held by provider] furosemide  20 mg Oral Daily     insulin aspart  1-7 Units Subcutaneous TID AC     insulin aspart  1-5 Units Subcutaneous At Bedtime     lactobacillus rhamnosus (GG)  1 capsule Oral BID     metFORMIN  2,000 mg Oral QAM      metroNIDAZOLE  500 mg Oral TID     miconazole   Topical BID     pioglitazone  45 mg Oral Daily     polyethylene glycol  17 g Oral Daily     senna-docusate  2 tablet Oral BID     sodium chloride (PF)  3 mL Intracatheter Q8H

## 2022-12-19 NOTE — PROGRESS NOTES
Orthopaedic Surgery Progress Note 12/19/2022    Interval Events  - AFVSS  - Hb stable, 9.7 on 12/18 compared to 9.6 on 12/12    Subjective  No acute events overnight.  Pain controlled. Denies new numbness, tingling, or weakness.  Tolerating diet without nausea or vomiting.  Voiding spontaneously.  +flatus    Objective  Temp: 98  F (36.7  C) Temp src: Oral BP: 120/57 Pulse: 78   Resp: 18 SpO2: 90 % O2 Device: None (Room air)      Exam:  General: NAD. Resting comfortably in bed.  Respiratory: Breathing comfortably on RA.  Musculoskeletal:     Focused Exam of Left Lower Extremity:  - Incision healing well, Steri-Strips in place. Glue beneath.  - Fires EHL, FHL, TA, GSC  - SILT DP, SP, Saph, Sural, Tibial Nerve Distributions  - 1+ DP pulse, foot WWP. 1+ edema leg.      Drain removed on 12/6    Recent Labs   Lab 12/18/22  0708 12/12/22  0612   WBC 9.1 8.5   HGB 9.7* 9.6*    421       Assessment: Waldo Busots is a 72 year old male s/p Explantation of Chronic  Infected Left Total Hip Arthroplasty, Extended Trochanteric Osteotomy with Extensive Debridement and Reconstruction Using Antibiotic Cement Spacer on 11/22/2022 with Dr. Meyers.    Plan:  12/19:   - Out of bed, to chair daily  - ID final recommendations in (see below), started on PO Abx on 12/1  - Discharge planning to TCU     Cultures: Proteus mirabilis     ----Infectious disease recommendations below----  -po ciprofloxacin 500mg BID and po metronidazole 500mg TID  Duration of total 6 weeks, from 11/22/2022.   -follow up with ID in 5 weeks, ideally ~1 week prior to completion of antibiotics        Activity: Up with assist and assistive devices as needed until independent.  Weight bearing status: NWB LLE  Antibiotics: Per ID, oral ciprofloxacin 500 mg bid and flagyl 500 mg TID   Diet: Begin with clear fluids and progress diet as tolerated. Bowel regimen. Anti-emetics PRN.    DVT prophylaxis: Initially on  mg (postop), then Eliquis 2.5 mg BID x2  weeks after surgery (started on 11/28), NOW ON 5 mg BID PTA, initially held for anemia  Elevation: Elevate heels off of bed on pillows   Wound Care: Open to air.  Drains: 15f GUERRERO drain REMOVED on 12/6  Tran: Removed POD1  Pain management: Orals PRN, IV for breakthrough only  X-rays: AP Pelvis and lateral in PACU complete  Physical Therapy: Mobilization, ROM, ADL's  Occupational Therapy: ADL's  Labs: Trend Hgb on POD #1, 2  Consults: PT, OT. ID. Hospitalist, appreciate assistance in caring for this patient throughout the perioperative period-      Disposition: Pending placement; anticipate discharge to likely TCU. Hb stable.  ID plan in place.      Orthopedic surgery staff for this patient is Dr. Meyers.    Kieran Moore MD  Orthopaedic Surgery PGY-4  [phone]    Please page me at [phone] with any questions/concerns. If there is no response, if it is a weekend, or if it is during evening hours then please page the orthopaedic surgery resident on call.       Future Appointments   Date Time Provider Department [place]   12/19/2022 11:00 AM Mona Polo, PT [facility] [place]   12/19/2022  7:00 PM UR OT WAITLIST [facility] [place]

## 2022-12-19 NOTE — PROGRESS NOTES
"[id number]    Plan of Care Reviewed With: Patient    Overall Patient Progress: No Change    Outcome Evaluation: Pt is A & O x4 on RA . C/O 8/10 L hip pain - administered PRN Tylenol & Roxicodone. Denies nausea, chest pain & SOB. Pt has L PIV - SL. LLE incision site approximated & adhesive strips CDI. Redness in skin folds & groin area noted upon skin assessment. Pt did not get oob during shift, voids spontaneously & uses call light appropriately.     BG    BG at 1717 was 131, per orders did not administer any units of insulin.     BG at 2225 was 230, per orders administered 1 unit of insulin.     Shift Updates     Pt ate dinner - tolerated well.     Pt has also been drinking fluids throughout shift, writer brought pt fluids multiple times throughout shift.     Vitals: BP (!) 146/73 (BP Location: Right arm)   Pulse 74   Temp 98.1  F (36.7  C) (Oral)   Resp 18   Ht 1.778 m (5' 10\")   Wt 147.1 kg (324 lb 4.8 oz)   SpO2 98%   BMI 46.53 kg/m      Plan: TBD, awaiting placement. Continue w/ plan of care.   "

## 2022-12-20 NOTE — PROGRESS NOTES
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   Internal Medicine was consulted for post-operative medical management.     Today's changes:   12/20/2022  Doing well  No new concern.   Working with PT. OT  Pain controlled.   Aw placement.       # Chronically infected left total hip arthroplasty now s/p explantation + extended debridement/reconstruction with antibiotic cement spacer (11/22/22)  Wound culture from 11/22: Finegoldia, Proteus Mirabilis  -Antibiotics: Was on iv Vancomycin, and Zosyn initially. Now on Ciprofloxacin, and Metronidazole. Duration of total 6 weeks, from 11/22/2022.   -Slow post op recovery, progress with therapy.   -Baseline EKG for monitoring with baseline QTc at 443  -Follow up with outpatient ID clinic, around 1 week prior to completion of antibiotics. Should see either Tracy Mcqueen, Nikko, or Moncho  - Defer further management to Ortho    - Activity: Up with assist and assistive devices as needed until independent.  - Weight bearing status: NWB LLE    # Acute Encephalopathy: resolved. Non focal. Suspect toxic metabolic.   Had new onset of confusion. Patient cant seem to recall why he is here. No fevers   New meds started are Cipro and Metronidazole. Sparing use of Oxycodone. Labs largely within normal limits  - Resolved. Continue monitoring.   - Delirium precautions.          # Normocytic anemia (?anemia of chronic disease) with acute blood loss anemia  Pre-operatively had hgb of 9.9 on 11/14. Patient has received 4 units of prbc since admission. Hemoglobin has  since stabilized ~9.0  - Transfuse if <7  - Outpatient w/u once acute illness resolves  - Continue to monitor while on eliquis.         # Acute hypoxemic respiratory failure, resolved   Most largely d/t  hypoventilation with background resistive lung disease given body habitus. VBG unremarkable w/o hypercarbia.  Lungs largely clear to auscultation bilaterally. No oxygen at home.   - Incentive spirometry, monitor respiratory rate with continuous oximetry  - Supplement oxygen as needed.         # HALEY: Resolved  Likely pre-renal given volume loss; though patient appears hypervolemic  Mildly elevated creatinine on the higher side of his recent values  Cr now 1.1  - Furosemide on hold.   - Monitor BMP         # Constipation:  On Daria-Colace twice daily, MiraLAX daily, Lactulose 3 times daily, Milk of mag as needed  - Continue above regimen     # JAIR with obesity  - home CPAP     # DM2  PTA med list includes metformin, glipizide, pioglitazone, victoza  Was on Insulin Aspart correction, which has now been discontinued. Started back on Metformin (2 gms daily) and Pioglitazone 45 mg. A1c 6.8 this admission.  - Continue current regimen  - Insulin Medium intensity correction    Recent Labs   Lab 12/20/22  1213 12/20/22  0828 12/19/22  2135 12/19/22  1827 12/19/22  1702 12/19/22  1340   * 134* 185* 131* 116* 132*         # CAD:  PTA on Atorvastatin, Aspirin  Aspirin has been held post op  - continue statin, RESUME aspirin when cleared by surgery team     # History of DVT/PE  Previously on DOAC (eliquis) but this was held perioperatively.  Initially on  mg (postop), then Eliquis 2.5 mg BID x2 weeks after surgery (started on 11/28), NOW ON 5 mg BID PTA, initially held for anemia  - Ct apixaban.    # Venous insufficiency with stasis dermatitis:   Erythematous patches on bilateral lower extremities - left anterior lower leg and smaller area on right lower leg. Low suspicion for cellulitis and more consistent with  "chronic stasis dermatitis  - PTA med list includes furosemide resumed today  - compression stockings     # Lack of motivation  - depression screen negative  -Trial of low dose ritalin (not home med)     # Respiratory acidosis-- intra-operative blood gases with high CO2 and low pH. Resolved.   # Hypocalcemia Resolved   # Hyperkalemia- mild and intermittent on the intra-op labs. Asymptomatic  -Resolved     # Groins, abd folds, buttocks- redness:   WOCN consultation  Miconazole powder.   Further care per WOCN       Diet: Advance Diet as Tolerated: Regular Diet Adult  Diet    DVT Prophylaxis: DOAC  Tran Catheter: Not present  Central Lines: None  Cardiac Monitoring: None  Code Status: Full Code      Disposition Plan      TBD. Aw placement. * family at bedside. Updated.     The patient's care was discussed with the Care Coordinator/, Patient and RN.    Nnamdi Esquivel MD  Hospitalist Service, [address]  Securely message with the Vocera Web Console (learn more here)  Text page via [facility] Paging/Directory   Please see signed in provider for up to date coverage information      Clinically Significant Risk Factors              # Hypoalbuminemia: Lowest albumin = 2.1 g/dL at 12/4/2022 10:35 AM, will monitor as appropriate          # DMII: A1C = 6.8 % (Ref range: 0.0 - 5.6 %) within past 3 months   # Severe Obesity: Estimated body mass index is 46.53 kg/m  as calculated from the following:    Height as of this encounter: 1.778 m (5' 10\").    Weight as of this encounter: 147.1 kg (324 lb 4.8 oz).          ______________________________________________________________________    Interval History   Interval events reviewed.   States doing well  Denies fever or chills.   No cough or cp or sob.   No LH or dizziness.   No NV or pain abdomen.   No new sensory or motor complaint.   Pain controlled.  Physical deconditioning.   BM+    No other new or acute medical " concern      Data reviewed today: I reviewed all medications, new labs and imaging results over the last 24 hours. I personally reviewed no images or EKG's today.    Physical Exam   Vital Signs: Temp: 97.7  F (36.5  C) Temp src: Oral BP: 138/60 Pulse: 82   Resp: 16 SpO2: 97 % O2 Device: None (Room air)    Weight: 324 lbs 4.75 oz    General Appearance: Awake, interactive, NAD, obese.   Respiratory: Normal work of breathing.  Cardiovascular: RRR   GI: Soft. NT. ND.  Extremities : distally wwp. No pedal edema.   Skin: per Ortho: incision healing well.   Neuro: Alert oriented. Grossly non focal.   Others: Stable mood.       Data   Recent Labs   Lab 12/20/22  1213 12/20/22  0828 12/19/22  2135 12/18/22  1300 12/18/22  0708 12/15/22  1352 12/15/22  0956   WBC  --   --   --   --  9.1  --   --    HGB  --   --   --   --  9.7*  --   --    MCV  --   --   --   --  94  --   --    PLT  --   --   --   --  344  --   --    NA  --   --   --   --  138  --   --    POTASSIUM  --   --   --   --  4.3  --   --    CHLORIDE  --   --   --   --  107  --   --    CO2  --   --   --   --  28  --   --    BUN  --   --   --   --  21  --   --    CR  --   --   --   --  1.19  --  1.11   ANIONGAP  --   --   --   --  3  --   --    MAIKOL  --   --   --   --  8.4*  --   --    * 134* 185*   < > 124*   < >  --     < > = values in this interval not displayed.     No results found for this or any previous visit (from the past 24 hour(s)).  Medications       apixaban ANTICOAGULANT  5 mg Oral BID     atorvastatin  40 mg Oral Daily     ciprofloxacin  500 mg Oral Q12H [facility] (08/20)     [Held by provider] furosemide  20 mg Oral Daily     insulin aspart  1-7 Units Subcutaneous TID AC     insulin aspart  1-5 Units Subcutaneous At Bedtime     lactobacillus rhamnosus (GG)  1 capsule Oral BID     metFORMIN  2,000 mg Oral QAM     metroNIDAZOLE  500 mg Oral TID     miconazole   Topical BID     pioglitazone  45 mg Oral Daily     polyethylene glycol  17 g Oral Daily      senna-docusate  2 tablet Oral BID     sodium chloride (PF)  3 mL Intracatheter Q8H

## 2022-12-20 NOTE — CARE PLAN
Pt AO x 4. Incision CDI approximated with steri strips. Pt complains of pain that is significantly worse with movement, given PRN 10 mg Oxycodone prior to movement. Ice pack applied. Pt able to transfer with assist of two using a liko lift and 2-3 staff while turning in bed. Pt had a BM today, loose while on BSC. BG have been well maintained today. Pt did not order lunch this afternoon, other than that pt had good intake. PIV intact. Awaiting placement at TCU.

## 2022-12-20 NOTE — PROGRESS NOTES
Waldo Bustos is a 72 year old male patient.  1. Prosthetic joint infection, initial encounter (H)      Past Medical History:   Diagnosis Date     Arthritis 5 years ago     Chronic osteoarthritis Not sure     Diabetes (H) 10-12 years ago     DVT (deep venous thrombosis) (H)      Dyslipidemia      Gastroesophageal reflux disease      History of blood transfusion      Iliopsoas abscess (H)      Infection of prosthetic hip joint (H)      JAIR (obstructive sleep apnea)      Pulmonary embolism (H)      RA (rheumatoid arthritis) (H) Not sure     Reduced vision 2-3 years ago    Cataract Surgery on both eyes     Venous insufficiency      Current Outpatient Medications   Medication Sig Dispense Refill     acetaminophen (TYLENOL) 325 MG tablet Take 2 tablets (650 mg) by mouth every 4 hours as needed for other (For optimal non-opioid multimodal pain management to improve pain control.)       apixaban ANTICOAGULANT (ELIQUIS) 2.5 MG tablet Take 1 tablet (2.5 mg) by mouth 2 times daily       apixaban ANTICOAGULANT (ELIQUIS) 5 MG tablet Take 1 tablet (5 mg) by mouth 2 times daily       hydrOXYzine (ATARAX) 10 MG tablet Take 1 tablet (10 mg) by mouth every 6 hours as needed for other (adjuvant pain)       methocarbamol (ROBAXIN) 500 MG tablet Take 1 tablet (500 mg) by mouth every 6 hours as needed for muscle spasms       metroNIDAZOLE (FLAGYL) 500 MG tablet Take 1 tablet (500 mg) by mouth 3 times daily       oxyCODONE (ROXICODONE) 5 MG tablet Take 1 tablet (5 mg) by mouth every 4 hours as needed for moderate pain (4-6) 24 tablet 0     polyethylene glycol (MIRALAX) 17 g packet Take 17 g by mouth daily       senna-docusate (SENOKOT-S/PERICOLACE) 8.6-50 MG tablet Take 2 tablets by mouth 2 times daily       Allergies   Allergen Reactions     Sulfa Drugs      Other reaction(s): *Unknown - Childhood Rxn     Tizanidine Other (See Comments)     Frequent urination; causes drowsiness, and dry mouth       Principal Problem:    Prosthetic  "joint infection, initial encounter (H)    Blood pressure 110/51, pulse 67, temperature 99.8  F (37.7  C), temperature source Oral, resp. rate 18, height 1.778 m (5' 10\"), weight 147.1 kg (324 lb 4.8 oz), SpO2 94 %.      Neal Amadro RN  12/20/2022    "

## 2022-12-20 NOTE — PROGRESS NOTES
Waldo Bustos is a 72 year old male patient.  1. Prosthetic joint infection, initial encounter (H)      Past Medical History:   Diagnosis Date     Arthritis 5 years ago     Chronic osteoarthritis Not sure     Diabetes (H) 10-12 years ago     DVT (deep venous thrombosis) (H)      Dyslipidemia      Gastroesophageal reflux disease      History of blood transfusion      Iliopsoas abscess (H)      Infection of prosthetic hip joint (H)      JAIR (obstructive sleep apnea)      Pulmonary embolism (H)      RA (rheumatoid arthritis) (H) Not sure     Reduced vision 2-3 years ago    Cataract Surgery on both eyes     Venous insufficiency      Current Outpatient Medications   Medication Sig Dispense Refill     acetaminophen (TYLENOL) 325 MG tablet Take 2 tablets (650 mg) by mouth every 4 hours as needed for other (For optimal non-opioid multimodal pain management to improve pain control.)       apixaban ANTICOAGULANT (ELIQUIS) 2.5 MG tablet Take 1 tablet (2.5 mg) by mouth 2 times daily       apixaban ANTICOAGULANT (ELIQUIS) 5 MG tablet Take 1 tablet (5 mg) by mouth 2 times daily       hydrOXYzine (ATARAX) 10 MG tablet Take 1 tablet (10 mg) by mouth every 6 hours as needed for other (adjuvant pain)       methocarbamol (ROBAXIN) 500 MG tablet Take 1 tablet (500 mg) by mouth every 6 hours as needed for muscle spasms       metroNIDAZOLE (FLAGYL) 500 MG tablet Take 1 tablet (500 mg) by mouth 3 times daily       oxyCODONE (ROXICODONE) 5 MG tablet Take 1 tablet (5 mg) by mouth every 4 hours as needed for moderate pain (4-6) 24 tablet 0     polyethylene glycol (MIRALAX) 17 g packet Take 17 g by mouth daily       senna-docusate (SENOKOT-S/PERICOLACE) 8.6-50 MG tablet Take 2 tablets by mouth 2 times daily       Allergies   Allergen Reactions     Sulfa Drugs      Other reaction(s): *Unknown - Childhood Rxn     Tizanidine Other (See Comments)     Frequent urination; causes drowsiness, and dry mouth       Principal Problem:    Prosthetic  "joint infection, initial encounter (H)    Blood pressure 110/51, pulse 67, temperature 99.8  F (37.7  C), temperature source Oral, resp. rate 18, height 1.778 m (5' 10\"), weight 147.1 kg (324 lb 4.8 oz), SpO2 94 %.    Alert and oriented X4  Denies numbness and tingling  Reports mild stabbing pain near the most distal part of his leg incision (4/10) Ice pack applied  Denies any other pain  Voided a small volume of dark mecca urine  Incision is clean dry and intact   Bed time blood glucose required no insulin        Juancarlos Iniguez, MARIBEL  12/19/2022    "

## 2022-12-20 NOTE — PROGRESS NOTES
SPIRITUAL HEALTH SERVICES Progress Note  [facility] ([facility]) 6B    I made two attempts at a follow up visit as the unit  today. At 9:15am, Rahat was trying to get some rest. When I checked at 1pm for a visit, Rahat was asleep.     Quincy Schneider MDiv  Chaplain Resident  Pager [phone]      * [facility] remains available 24/7 for emergent requests/referrals, either by having the switchboard page the on-call  or by entering an ASAP/STAT consult in Epic (this will also page the on-call ). Routine Epic consults receive an initial response within 24 hours.*

## 2022-12-20 NOTE — PROGRESS NOTES
Pt A&Ox4. Able to make needs known. L PIV SL. Continent bowel and bladder; uses urinal at bedside -- last BM per pt 12/19. Visible skin intact except bruising on leg. Denies SOB, chest pain, nausea/vomiting, and numbness/tingling. Pain managed with oxycodone. Possible discharge to TCU on Saturday. No acute events. Call light in reach.

## 2022-12-20 NOTE — PROGRESS NOTES
"Orthopaedic Surgery Progress Note 12/20/2022    Interval Events  - AFVSS  - CRP 6.1 (46), ESR 86  - Discharge planning -- TCU    Subjective  No acute events overnight.  States \"same\" this AM compared to previous.  Pain moderately controlled this AM. Denies new numbness, tingling, or weakness.  Tolerating diet without nausea or vomiting.  Voiding spontaneously.  +flatus, -BM.   Difficulty getting to commode with nursing assist    Objective  Temp: 99.8  F (37.7  C) Temp src: Oral BP: 110/51 Pulse: 67   Resp: 18 SpO2: 94 % O2 Device: None (Room air)      Exam:  General: NAD. Resting comfortably in bed.  Respiratory: Breathing comfortably on RA.  Musculoskeletal:     Focused Exam of Left Lower Extremity:  - Incision healing well, Steri-Strips in place. Glue beneath.  - Fires EHL, FHL, TA, GSC  - SILT DP, SP, Saph, Sural, Tibial Nerve Distributions  - 1+ DP pulse, foot WWP. 1+ edema leg.      Drain removed on 12/6    Recent Labs   Lab 12/19/22  0905 12/18/22  0708   WBC  --  9.1   HGB  --  9.7*   PLT  --  344   CRP 6.1  --        Assessment: Waldo Bustos is a 72 year old male s/p Explantation of Chronic  Infected Left Total Hip Arthroplasty, Extended Trochanteric Osteotomy with Extensive Debridement and Reconstruction Using Antibiotic Cement Spacer on 11/22/2022 with Dr. Meyers.     Plan:  12/20:   - Out of bed, to chair daily  - ID final recommendations in (see below), started on PO Abx on 12/1  - Discharge planning to TCU     Cultures: Proteus mirabilis     ----Infectious disease recommendations below----  -po ciprofloxacin 500mg BID and po metronidazole 500mg TID  Duration of total 6 weeks, from 11/22/2022.   -follow up with ID in 5 weeks, ideally ~1 week prior to completion of antibiotics        Activity: Up with assist and assistive devices as needed until independent.  Weight bearing status: NWB LLE  Antibiotics: Per ID, oral ciprofloxacin 500 mg bid and flagyl 500 mg TID   Diet: Begin with clear fluids and " progress diet as tolerated. Bowel regimen. Anti-emetics PRN.    DVT prophylaxis: Initially on  mg (postop), then Eliquis 2.5 mg BID x2 weeks after surgery (started on 11/28), NOW ON 5 mg BID PTA, initially held for anemia  Elevation: Elevate heels off of bed on pillows   Wound Care: Open to air.  Drains: 15f GUERRERO drain REMOVED on 12/6  Tran: Removed POD1  Pain management: Orals PRN, IV for breakthrough only  X-rays: AP Pelvis and lateral in PACU complete  Physical Therapy: Mobilization, ROM, ADL's  Occupational Therapy: ADL's  Labs: Trend Hgb on POD #1, 2  Consults: PT, OT. ID. Hospitalist, appreciate assistance in caring for this patient throughout the perioperative period-      Disposition: Pending placement; anticipate discharge to likely TCU. Hb stable.  ID plan in place.   Follow-up: TBD     Orthopedic surgery staff for this patient is Dr. Meyers.    Kieran Moore MD  Orthopaedic Surgery PGY-4  [phone]    Please page me at [phone] with any questions/concerns. If there is no response, if it is a weekend, or if it is during evening hours then please page the orthopaedic surgery resident on call.       Future Appointments   Date Time Provider Department [place]   12/20/2022 11:15 AM Roselyn Aguilera PT [facility] [place]   12/26/2022  7:00 PM UR [facility] [place]

## 2022-12-21 NOTE — PROGRESS NOTES
Orthopaedic Surgery Progress Note 12/21/2022    Interval Events  - AFVSS  - Awaiting TCU    Subjective  No acute events overnight.  Pain controlled. Denies new numbness, tingling, or weakness.  Tolerating diet without nausea or vomiting.  Voiding spontaneously.  +flatus, -BM.   States PT has not worked with him in a couple days    Objective  Temp: 97.9  F (36.6  C) Temp src: Oral BP: 130/55 Pulse: 64   Resp: 17 SpO2: 94 % O2 Device: None (Room air)      Exam:  General: NAD. Resting comfortably in bed.  Respiratory: Breathing comfortably on RA.  Musculoskeletal:     Focused Exam of Left Lower Extremity:  - Incision healing well, Steri-Strips in place. Glue beneath.  - Fires EHL, FHL, TA, GSC  - SILT DP, SP, Saph, Sural, Tibial Nerve Distributions  - 1+ DP pulse, foot WWP. 1+ edema leg.      Drain removed on 12/6    Recent Labs   Lab 12/19/22  0905 12/18/22  0708   WBC  --  9.1   HGB  --  9.7*   PLT  --  344   CRP 6.1  --        Assessment: Waldo Bustos is a 72 year old male s/p Explantation of Chronic  Infected Left Total Hip Arthroplasty, Extended Trochanteric Osteotomy with Extensive Debridement and Reconstruction Using Antibiotic Cement Spacer on 11/22/2022 with Dr. Meyers.     Plan:  12/21:   - Out of bed, to chair daily  - ID final recommendations in (see below), started on PO Abx on 12/1  -- Discharge planning to TCU  -- Okay to discharge from orthopaedics perspective     Cultures: Proteus mirabilis     ----Infectious disease recommendations below----  -po ciprofloxacin 500mg BID and po metronidazole 500mg TID  Duration of total 6 weeks, from 11/22/2022.   -follow up with ID in 5 weeks, ideally ~1 week prior to completion of antibiotics        Activity: Up with assist and assistive devices as needed until independent.  Weight bearing status: NWB LLE  Antibiotics: Per ID, oral ciprofloxacin 500 mg bid and flagyl 500 mg TID   Diet: Begin with clear fluids and progress diet as tolerated. Bowel regimen.  Anti-emetics PRN.    DVT prophylaxis: Initially on  mg (postop), then Eliquis 2.5 mg BID x2 weeks after surgery (started on 11/28), NOW ON 5 mg BID PTA, initially held for anemia  Elevation: Elevate heels off of bed on pillows   Wound Care: Open to air.  Drains: 15f GUERRERO drain REMOVED on 12/6  Tran: Removed POD1  Pain management: Orals PRN, IV for breakthrough only  X-rays: AP Pelvis and lateral in PACU complete  Physical Therapy: Mobilization, ROM, ADL's  Occupational Therapy: ADL's  Labs: Trend Hgb on POD #1, 2  Consults: PT, OT. ID. Hospitalist, appreciate assistance in caring for this patient throughout the perioperative period-      Disposition: Pending placement; anticipate discharge to likely TCU. Hb stable.  ID plan in place.   Follow-up: TBD    Orthopedic surgery staff for this patient is Dr. Meyers.    Kieran Moore MD  Orthopaedic Surgery PGY-4  [phone]    Please page me at [phone] with any questions/concerns. If there is no response, if it is a weekend, or if it is during evening hours then please page the orthopaedic surgery resident on call.       Future Appointments   Date Time Provider Department [place]   12/21/2022 12:00 PM UR PT REHAB [facility]   12/26/2022  7:00 PM [facility]

## 2022-12-21 NOTE — PROGRESS NOTES
Care Management Follow Up    Length of Stay (days): 29    Expected Discharge Date:       Concerns to be Addressed:       Patient plan of care discussed at interdisciplinary rounds: Yes    Anticipated Discharge Disposition: Transitional Care     Anticipated Discharge Services:    Anticipated Discharge DME:      Patient/family educated on Medicare website which has current facility and service quality ratings:    Education Provided on the Discharge Plan:    Patient/Family in Agreement with the Plan:      Referrals Placed by CM/SW:    Private pay costs discussed: Not applicable    Additional Information:    Checked in with Diana chiu with [facility] about referral sent on 12/7 by JEANNIE Wells Per her note.  They did not receive a referral on him.  She will review him for placement.       Pending:  [facility]   [address] [facility]  [place] [address]  Contact Information   [phone]  12/21 call to follow up on referrals. no bariatric bed available.       [facility] -  Address  [address] [facility] [address]  Contact Information   [phone]   12/21  Call to admissions, message left with request for return call on referral.   12/21  Return call.  Cannot take if has ongoing wound care.      [facility] -   [address] [facility] [address]  Contact Information   [phone]   12/21 Left voice mail for admissions inquiring about referral.   Return call.  No beds at this time.     TCU   12/21 being reviewed per Diana Barajas.   12/21 on waiting list pending insurance check.    New Referrals Sent 12/21:     Critical access hospital ()  [address] [facility] [id number] [phone]  12/21 1:52 PM  [facility],  They do have an appropriate bed right now.    [facility] ([facility])  [address] [facility] [address] [phone]     [facility])  [address] [facility]  [address] [phone]       [facility] (TCU)  [address] [facility] [address]   [phone]     [facility] (SNF)  [address]  [facility] [address]   [phone]     [facility] (LTC)  [address] [facility] [address]   [phone]     [facility] (SNF)  [address] [facility] [address] [phone]     [facility] (SNF)  [address] [place] [address] [phone]     [facility] (SNF)  [address]  [facility] [address] [phone]       Sonya Avilez RN, BA  Care Coordinator  6 Med Surg  [phone], pager [phone]      For weekend social work needs, contact information below and available on Am[employer]:      Weekend [place] Pager: [phone]   Weekend 6MS, 8A, 10ICU- Pager: [phone]     For weekend RN care coordinator needs (home discharge with needs including home care, assisted living facility returns, durable medical equip, IV antibiotics) - page [phone].                                                                          Sonya Avilez RN, BA  Care Coordinator  6 Med Surg  [phone], pager [phone]      For weekend social work needs, contact information below and available on Am[employer]:      Weekend [place] Pager: [phone]   Weekend 6MS, 8A, 10ICU- Pager: [phone]     For weekend RN care coordinator needs (home discharge with needs including home care, assisted living facility returns, durable medical equip, IV antibiotics) - page [phone].

## 2022-12-21 NOTE — PROGRESS NOTES
Waldo Bustos is a 72 year old male patient.  1. Prosthetic joint infection, initial encounter (H)      Past Medical History:   Diagnosis Date     Arthritis 5 years ago     Chronic osteoarthritis Not sure     Diabetes (H) 10-12 years ago     DVT (deep venous thrombosis) (H)      Dyslipidemia      Gastroesophageal reflux disease      History of blood transfusion      Iliopsoas abscess (H)      Infection of prosthetic hip joint (H)      JAIR (obstructive sleep apnea)      Pulmonary embolism (H)      RA (rheumatoid arthritis) (H) Not sure     Reduced vision 2-3 years ago    Cataract Surgery on both eyes     Venous insufficiency      Current Outpatient Medications   Medication Sig Dispense Refill     acetaminophen (TYLENOL) 325 MG tablet Take 2 tablets (650 mg) by mouth every 4 hours as needed for other (For optimal non-opioid multimodal pain management to improve pain control.)       apixaban ANTICOAGULANT (ELIQUIS) 2.5 MG tablet Take 1 tablet (2.5 mg) by mouth 2 times daily       apixaban ANTICOAGULANT (ELIQUIS) 5 MG tablet Take 1 tablet (5 mg) by mouth 2 times daily       hydrOXYzine (ATARAX) 10 MG tablet Take 1 tablet (10 mg) by mouth every 6 hours as needed for other (adjuvant pain)       methocarbamol (ROBAXIN) 500 MG tablet Take 1 tablet (500 mg) by mouth every 6 hours as needed for muscle spasms       metroNIDAZOLE (FLAGYL) 500 MG tablet Take 1 tablet (500 mg) by mouth 3 times daily       oxyCODONE (ROXICODONE) 5 MG tablet Take 1 tablet (5 mg) by mouth every 4 hours as needed for moderate pain (4-6) 24 tablet 0     polyethylene glycol (MIRALAX) 17 g packet Take 17 g by mouth daily       senna-docusate (SENOKOT-S/PERICOLACE) 8.6-50 MG tablet Take 2 tablets by mouth 2 times daily       Allergies   Allergen Reactions     Sulfa Drugs      Other reaction(s): *Unknown - Childhood Rxn     Tizanidine Other (See Comments)     Frequent urination; causes drowsiness, and dry mouth       Principal Problem:    Prosthetic  "joint infection, initial encounter (H)    Blood pressure (!) 144/68, pulse 70, temperature 97.6  F (36.4  C), temperature source Oral, resp. rate 17, height 1.778 m (5' 10\"), weight 147.1 kg (324 lb 4.8 oz), SpO2 97 %.       Alert and oriented X4  Denies numbness and tingling (with the exception of his right hand Mild tingling)  Reports mild neck pain ( resolved w/ tylenol PRN)   Denies any other pain except for when repositioning   Voided a small volume of dark mecca urine  Incision is clean dry and intact   left lower leg scab circled with marker about the size of an nickel   Breakfast and lunch blood glucose required one unit of insulin each  Declined repositioning; educated him on the importance of repositioning to prevent pressure ulcers/wounds; he said he could reposition independently (shift from left to right)    Juancarlos Iniguez RN  12/21/2022    "

## 2022-12-21 NOTE — PROGRESS NOTES
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  Internal Medicine was consulted for post-operative medical management.     # Chronically infected left total hip arthroplasty  # S/p explantation + extended debridement/reconstruction with antibiotic cement spacer on 11/22/22  - Wound culture from 11/22: Finegoldia, Proteus Mirabilis  - Antibiotics: Was on iv Vancomycin, and Zosyn initially. Now on Ciprofloxacin and Metronidazole. - Duration of treatment is a total of 6 weeks, from 11/22 - 1/3/23.   - Slow post op progress/recovery with therapy.   - Baseline EKG for monitoring with baseline QTc at 443  - Follow up with outpatient ID clinic, around 1 week prior to completion of antibiotics. Should see either Tracy Mcqueen, Nikko, or Moncho  - Defer further management to Ortho    - Activity: Up with assist and assistive devices as needed until independent.  - Weight bearing status: NWB LLE    # Acute Encephalopathy  - Non focal.   - Suspect toxic metabolic.   - Had new onset of confusion. Patient seem to recall why he is here. No fevers   - New meds started are Cipro and Metronidazole. Sparing use of Oxycodone.   - Labs largely within normal limits  - Resolved.    - Delirium precautions.   - Continue monitoring      # Normocytic anemia (?anemia of chronic disease) with acute blood loss anemia  Pre-operatively had hgb of 9.9 on 11/14. Patient has received 4 units of prbc since admission. Hemoglobin has since stabilized ~9.0  - Transfuse if <7  - Outpatient w/u once acute illness resolves  - Continue to  monitor while on eliquis.      # Acute hypoxemic respiratory failure, resolved   Most largely d/t  hypoventilation with background resistive lung disease given body habitus. VBG unremarkable w/o hypercarbia.  Lungs largely clear to auscultation bilaterally. No oxygen at home.   - Incentive spirometry, monitor respiratory rate with continuous oximetry  - Supplement oxygen as needed.       # HALEY: Resolved  Likely pre-renal given volume loss; though patient appears hypervolemic  Mildly elevated creatinine on the higher side of his recent values  Cr now 0.9  - Furosemide on hold.   - Monitor BMP      # Constipation:  On Daria-Colace twice daily, MiraLAX daily, Lactulose 3 times daily, Milk of mag as needed  - Continue above regimen     # JAIR with obesity  - home CPAP     # DM2  PTA med list includes metformin, glipizide, pioglitazone, victoza  Was on Insulin Aspart correction, which has now been discontinued. Started back on Metformin (2 gms daily) and Pioglitazone 45 mg. A1c 6.8 this admission.  - Continue current regimen  - Insulin Medium intensity correction    Recent Labs   Lab 12/21/22  1120 12/21/22  0833 12/21/22  0815 12/20/22  2120 12/20/22  1600 12/20/22  1213   * 156* 149* 185* 139* 147*         # CAD:  - PTA on Atorvastatin and Aspirin  - Continue PTA atorvastatin  -Continue holding PTA ASA while on Eliquis     # History of DVT/PE  Previously on DOAC (eliquis) but this was held perioperatively.    - Initially on  mg (postop), then Eliquis 2.5 mg BID x2 weeks after surgery (started on 11/28)  - Continue Eliquis 5 mg po bid  - Continue holding PTA ASA     # Venous insufficiency with stasis dermatitis:   Erythematous patches on bilateral lower extremities   - Left anterior lower leg and smaller area on right lower leg.   - Low suspicion for cellulitis and more consistent with chronic stasis dermatitis  - PTA med list includes furosemide which is on hold  - Compression stockings     # Lack of  motivation  - depression screen negative  -Trial of low dose ritalin (not home med)    # Hypocalcemia Resolved   # Hyperkalemia- mild and intermittent on the intra-op labs. Asymptomatic  -Resolved     # Groins, abd folds, buttocks- redness:   WOCN consultation  Miconazole powder.   Further care per WOCN          Diet: Advance Diet as Tolerated: Regular Diet Adult  Diet    DVT Prophylaxis: DOAC  Tran Catheter: Not present  Central Lines: None  Cardiac Monitoring: None  Code Status: Full Code    Disposition Plan   Awaiting for placement       The patient's care was discussed with the Care Coordinator/, Patient and RN.          Nii Brink MD  Hospitalist Service, GOLD TEAM [address] [place] [address]  Securely message with the Vocera Web Console (learn more here)  Text page via [employer] Paging/Directory   Please see signed in provider for up to date coverage information      ______________________________________________________________________    Interval History   Uneventful night  No complaints  Awaiting for placement  Patient started to participate in therapy      Data reviewed today: I reviewed all medications, new labs and imaging results over the last 24 hours. I personally reviewed no images or EKG's today.    Physical Exam   Vital Signs: Temp: 97.6  F (36.4  C) Temp src: Oral BP: (!) 144/68 Pulse: 70   Resp: 17 SpO2: 97 % O2 Device: None (Room air)    Weight: 324 lbs 4.75 oz    General Appearance: Awake, interactive, NAD, obese.   Respiratory: Normal work of breathing.  Cardiovascular: RRR   GI: Soft. NT. ND.  Extremities : distally wwp. No pedal edema.   Skin: per Ortho: incision healing well.   Neuro: Alert oriented. Grossly non focal.   Others: Stable mood.       Data   Recent Labs   Lab 12/21/22  1120 12/21/22  0833 12/21/22  0815 12/18/22  1300 12/18/22  0708 12/15/22  1352 12/15/22  0956   WBC  --  9.3  --   --  9.1  --   --    HGB  --  9.4*  --   --  9.7*   --   --    MCV  --  91  --   --  94  --   --    PLT  --  325  --   --  344  --   --    NA  --  137  --   --  138  --   --    POTASSIUM  --  4.5  --   --  4.3  --   --    CHLORIDE  --  108  --   --  107  --   --    CO2  --  23  --   --  28  --   --    BUN  --  28  --   --  21  --   --    CR  --  0.99  --   --  1.19  --  1.11   ANIONGAP  --  6  --   --  3  --   --    MAIKOL  --  8.6  --   --  8.4*  --   --    * 156* 149*   < > 124*   < >  --     < > = values in this interval not displayed.     No results found for this or any previous visit (from the past 24 hour(s)).  Medications       apixaban ANTICOAGULANT  5 mg Oral BID     atorvastatin  40 mg Oral Daily     ciprofloxacin  500 mg Oral Q12H [facility] (08/20)     [Held by provider] furosemide  20 mg Oral Daily     insulin aspart  1-7 Units Subcutaneous TID AC     insulin aspart  1-5 Units Subcutaneous At Bedtime     lactobacillus rhamnosus (GG)  1 capsule Oral BID     metFORMIN  2,000 mg Oral QAM     metroNIDAZOLE  500 mg Oral TID     miconazole   Topical BID     pioglitazone  45 mg Oral Daily     polyethylene glycol  17 g Oral Daily     senna-docusate  2 tablet Oral BID     sodium chloride (PF)  3 mL Intracatheter Q8H

## 2022-12-22 NOTE — PLAN OF CARE
"Goal Outcome Evaluation:           VS: BP (!) 165/71 (BP Location: Left arm)   Pulse 70   Temp 97.6  F (36.4  C) (Oral)   Resp 12   Ht 1.778 m (5' 10\")   Wt 147.1 kg (324 lb 4.8 oz)   SpO2 94%   BMI 46.53 kg/m       O2: Room air    Output: Voids with bedside urinal    Last BM: 12/19   Activity: Assist x2  with lift to bedside commode and chair    Up for meals? Yes   Skin: L. Hip incision valencia    Pain: Denies pain    CMS: A/O x 4 - Denies chest pain , sob , and n/t   Dressing: L Hip incision VALENCIA   Diet: Regular - BS check TID    LDA: L PIV SL    Equipment: IV pole ,personal items, call light within reach   Plan: Continue with POC    Additional Info:                     " 97.9

## 2022-12-22 NOTE — PROGRESS NOTES
"2041-4912:     /57 (BP Location: Right arm)   Pulse 71   Temp 97.7  F (36.5  C) (Oral)   Resp 16   Ht 1.778 m (5' 10\")   Wt 147.1 kg (324 lb 4.8 oz)   SpO2 95%   BMI 46.53 kg/m      A&Ox4  Assist of 2 w/ lift  Regular diet  Urinal at bedside  L-PIV - SL    Pt had an uneventful night, given PRN tylenol and oxy 1x. BG checks. Appeared to rest well. Denied CP, SOB, N/V.     Plan: pending TCU placement  "

## 2022-12-22 NOTE — PROGRESS NOTES
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  Internal Medicine was consulted for post-operative medical management.     # Chronically infected left total hip arthroplasty  # S/p explantation + extended debridement/reconstruction with antibiotic cement spacer on 11/22/22  - Wound culture from 11/22: Finegoldia, Proteus Mirabilis  - Antibiotics: Was on iv Vancomycin, and Zosyn initially. Now on Ciprofloxacin and Metronidazole. - Duration of treatment is a total of 6 weeks, from 11/22 - 1/3/23.   - Slow post op progress/recovery with therapy.   - Baseline EKG for monitoring with baseline QTc at 443  - Follow up with outpatient ID clinic, around 1 week prior to completion of antibiotics. Should see either Tracy Mcqueen, Nikko, or Moncho  - Defer further management to Ortho    - Activity: Up with assist and assistive devices as needed until independent.  - Weight bearing status: NWB LLE    # Acute Encephalopathy  - Non focal.   - Suspect toxic metabolic.   - Had new onset of confusion. Patient seem to recall why he is here. No fevers   - New meds started are Cipro and Metronidazole. Sparing use of Oxycodone.   - Labs largely within normal limits  - Resolved.    - Delirium precautions.   - Continue monitoring      # Normocytic anemia (?anemia of chronic disease) with acute blood loss anemia  Pre-operatively had hgb of 9.9 on 11/14. Patient has received 4 units of prbc since admission. Hemoglobin has since stabilized ~9.0  - Transfuse if <7  - Outpatient w/u once acute illness resolves  - Continue to  monitor while on eliquis.      # Acute hypoxemic respiratory failure, resolved   Most largely d/t  hypoventilation with background resistive lung disease given body habitus. VBG unremarkable w/o hypercarbia.  Lungs largely clear to auscultation bilaterally. No oxygen at home.   - Incentive spirometry, monitor respiratory rate with continuous oximetry  - Supplement oxygen as needed.       # HALEY: Resolved  Likely pre-renal given volume loss; though patient appears hypervolemic  Mildly elevated creatinine on the higher side of his recent values  Cr now 0.9  - Furosemide on hold.   - Monitor BMP      # Constipation:  On Daria-Colace twice daily, MiraLAX daily, Lactulose 3 times daily, Milk of mag as needed  - Continue above regimen     # JAIR with obesity  - home CPAP     # DM2  PTA med list includes metformin, glipizide, pioglitazone, victoza  Was on Insulin Aspart correction, which has now been discontinued. Started back on Metformin (2 gms daily) and Pioglitazone 45 mg. A1c 6.8 this admission.  - Continue current regimen  - Insulin Medium intensity correction    Recent Labs   Lab 12/22/22  0922 12/21/22  2251 12/21/22  1812 12/21/22  1307 12/21/22  1120 12/21/22  0833   * 193* 149* 176* 151* 156*         # CAD:  - PTA on Atorvastatin and Aspirin  - Continue PTA atorvastatin  -Continue holding PTA ASA while on Eliquis     # History of DVT/PE  Previously on DOAC (eliquis) but this was held perioperatively.    - Initially on  mg (postop), then Eliquis 2.5 mg BID x2 weeks after surgery (started on 11/28)  - Continue Eliquis 5 mg po bid  - Continue holding PTA ASA     # Venous insufficiency with stasis dermatitis:   Erythematous patches on bilateral lower extremities   - Left anterior lower leg and smaller area on right lower leg.   - Low suspicion for cellulitis and more consistent with chronic stasis dermatitis  - PTA med list includes furosemide which is on hold  - Compression stockings     # Lack of  motivation  - depression screen negative  -Trial of low dose ritalin (not home med)    # Hypocalcemia Resolved   # Hyperkalemia- mild and intermittent on the intra-op labs. Asymptomatic  -Resolved     # Groins, abd folds, buttocks- redness:   WOCN consultation  Miconazole powder.   Further care per WOCN          Diet: Advance Diet as Tolerated: Regular Diet Adult  Diet    DVT Prophylaxis: DOAC  Tran Catheter: Not present  Central Lines: None  Cardiac Monitoring: None  Code Status: Full Code    Disposition Plan   Awaiting for placement       The patient's care was discussed with the Care Coordinator/, Patient and RN.          Nii Brink MD  Hospitalist Service, GOLD TEAM [address] [place] [address]  Securely message with the Vocera Web Console (learn more here)  Text page via [employer] Paging/Directory   Please see signed in provider for up to date coverage information      ______________________________________________________________________    Interval History   Uneventful night  No complaints  Awaiting for placement to TCU    Data reviewed today: I reviewed all medications, new labs and imaging results over the last 24 hours. I personally reviewed no images or EKG's today.    Physical Exam   Vital Signs: Temp: 97.6  F (36.4  C) Temp src: Oral BP: (!) 165/71 Pulse: 70   Resp: 12 SpO2: 94 % O2 Device: None (Room air)    Weight: 324 lbs 4.75 oz    General Appearance: Awake, interactive, NAD, obese.   Respiratory: Normal work of breathing.  Cardiovascular: RRR   GI: Soft. NT. ND.  Extremities : distally wwp. No pedal edema.   Skin: per Ortho: incision healing well.   Neuro: Alert oriented. Grossly non focal.   Others: Stable mood.       Data   Recent Labs   Lab 12/22/22  0922 12/21/22  2251 12/21/22  1812 12/21/22  1120 12/21/22  0833 12/18/22  1300 12/18/22  0708   WBC  --   --   --   --  9.3  --  9.1   HGB  --   --   --   --  9.4*  --  9.7*   MCV  --   --   --   --  91   --  94   PLT  --   --   --   --  325  --  344   NA  --   --   --   --  137  --  138   POTASSIUM  --   --   --   --  4.5  --  4.3   CHLORIDE  --   --   --   --  108  --  107   CO2  --   --   --   --  23  --  28   BUN  --   --   --   --  28  --  21   CR  --   --   --   --  0.99  --  1.19   ANIONGAP  --   --   --   --  6  --  3   MAIKOL  --   --   --   --  8.6  --  8.4*   * 193* 149*   < > 156*   < > 124*    < > = values in this interval not displayed.     No results found for this or any previous visit (from the past 24 hour(s)).  Medications       apixaban ANTICOAGULANT  5 mg Oral BID     atorvastatin  40 mg Oral Daily     ciprofloxacin  500 mg Oral Q12H [facility] (08/20)     [Held by provider] furosemide  20 mg Oral Daily     insulin aspart  1-7 Units Subcutaneous TID AC     insulin aspart  1-5 Units Subcutaneous At Bedtime     lactobacillus rhamnosus (GG)  1 capsule Oral BID     metFORMIN  2,000 mg Oral QAM     metroNIDAZOLE  500 mg Oral TID     miconazole   Topical BID     pioglitazone  45 mg Oral Daily     polyethylene glycol  17 g Oral Daily     senna-docusate  2 tablet Oral BID     sodium chloride (PF)  3 mL Intracatheter Q8H

## 2022-12-22 NOTE — PROGRESS NOTES
Care Management Follow Up    Length of Stay (days): 30    Expected Discharge Date:  TBD     Concerns to be Addressed: Discharge Planning        Patient plan of care discussed at interdisciplinary rounds: Yes    Anticipated Discharge Disposition: Transitional Care     Anticipated Discharge Services: TCU     Anticipated Discharge DME:  TBD    Patient/family educated on Medicare website which has current facility and service quality ratings:  Yes    Education Provided on the Discharge Plan:  Yes    Patient/Family in Agreement with the Plan:  Yes    Referrals Placed by CM/SW: TCU    Private pay costs discussed: Not applicable    Additional Information:    Pending Referrals    [facility]  [address] [place] [address]  P: [phone]  12/22: Spoke with Liaison and stefania is #3 on waiting list and authorization is pending.    [facility]   [address]  [place] [address]  P: [phone]  12/21: call to follow up on referrals. no bariatric bed available.    12/22: Writer left message with admissions requesting a call back regarding referral status.    [facility]   [address]  [facility] [address]  P: [phone]  12/21:  Call to admissions, message left with request for return call on referral.   12/21:  Return call.  Cannot take if has ongoing wound care.   12/22: Writer left message with admissions requesting a call back regarding referral status.    [facility]  [address] [facility] [address]  P: [phone]   12/21: Left voice mail for admissions inquiring about referral.   Return call.  No beds at this time.  12/22:  Writer left message with admissions requesting a call back regarding referral status.    [facility]  [address]  [facility] [address]  P: [phone]   12/21: Referral Sent  12/22: Writer left message with admissions requesting a call back regarding referral status.    [facility]   [address]  [facility] [address]  P: [phone]   12/21: Referral Sent  12/22: Writer spoke with admissions. They have not yet reviewed patient, but will and get back with SW.    [facility]  [address] [facility] [address]  P: [phone]  12/21: Referral Sent  12/22: Writer spoke with admissions and they will call back regarding status.    [facility]  [address] [address]  P: [phone]   12/21: Referral Sent  12/22: Spoke with Marvin in admissions. They will review and call back with decision.    [facility]  [address] [facility] [address]  P: [phone]   12/21: Referral Sent  12/22: Writer left message with admissions requesting a call back regarding referral status.    Declined Referrals    [facility]  [address] [facility] [address]  P: [phone]   12/21: Referral Sent  12/22: Declined, due to they cannot meet patient's needs.    [facility]   [address] [facility] [address]  P: [phone]  12/21: 1:52 PM Call from [facility],  They do have an appropriate bed right now.  12/22: Declined, due to acuity.    [facility]  [address] [facility] [address]  P: [phone]  12/21: Referral Sent  12/22: Declined, due to insurance.    [facility]   [address] [facility] [address]  P: [phone]   12/21: Referral Sent  12/22: Declined, due to insurance.    PHIL Castillo, MSW  Adult Acute Care [facility]   Pager [phone]

## 2022-12-22 NOTE — PROGRESS NOTES
Orthopaedic Surgery Progress Note 12/22/2022    Interval Events  - AFVSS  - SW: new referrals sent 12/21    Subjective  No acute events overnight.  Pain controlled, stable compared to previous. Denies new numbness, tingling, or weakness.  Tolerating diet without nausea or vomiting.  Voiding spontaneously.  +flatus, +BM    Objective  Temp: 97.7  F (36.5  C) Temp src: Oral BP: 115/57 Pulse: 71   Resp: 16 SpO2: 95 % O2 Device: None (Room air)      Exam:  General: NAD. Resting comfortably in bed.  Respiratory: Breathing comfortably on RA.  Musculoskeletal:     Focused Exam of Left Lower Extremity:  - Incision healing well, Steri-Strips in place. Glue beneath.  - Fires EHL, FHL, TA, GSC  - SILT DP, SP, Saph, Sural, Tibial Nerve Distributions  - 1+ DP pulse, foot WWP. 1+ edema leg.      Drain removed on 12/6    Recent Labs   Lab 12/21/22  0833 12/19/22  0905 12/18/22  0708   WBC 9.3  --  9.1   HGB 9.4*  --  9.7*     --  344   CRP  --  6.1  --        Assessment: Waldo Bustos is a 72 year old male s/p Explantation of Chronic  Infected Left Total Hip Arthroplasty, Extended Trochanteric Osteotomy with Extensive Debridement and Reconstruction Using Antibiotic Cement Spacer on 11/22/2022 with Dr. Meyers.     Plan:  12/22:   - Out of bed, to chair daily  - ID final recommendations in (see below), started on PO Abx on 12/1  -- Discharge planning to TCU  -- Okay to discharge from orthopaedics perspective     Cultures: Proteus mirabilis     ----Infectious disease recommendations below----  -po ciprofloxacin 500mg BID and po metronidazole 500mg TID  Duration of total 6 weeks, from 11/22/2022.   -follow up with ID in 5 weeks, ideally ~1 week prior to completion of antibiotics        Activity: Up with assist and assistive devices as needed until independent.  Weight bearing status: NWB LLE  Antibiotics: Per ID, oral ciprofloxacin 500 mg bid and flagyl 500 mg TID   Diet: Begin with clear fluids and progress diet as  tolerated. Bowel regimen. Anti-emetics PRN.    DVT prophylaxis: Initially on  mg (postop), then Eliquis 2.5 mg BID x2 weeks after surgery (started on 11/28), NOW ON 5 mg BID PTA, initially held for anemia  Elevation: Elevate heels off of bed on pillows   Wound Care: Open to air.  Drains: 15f GUERRERO drain REMOVED on 12/6  Tran: Removed POD1  Pain management: Orals PRN, IV for breakthrough only  X-rays: AP Pelvis and lateral in PACU complete  Physical Therapy: Mobilization, ROM, ADL's  Occupational Therapy: ADL's  Labs: Trend Hgb on POD #1, 2  Consults: PT, OT. ID. Hospitalist, appreciate assistance in caring for this patient throughout the perioperative period-      Disposition: Pending placement; anticipate discharge to likely TCU. Hb stable.  ID plan in place.   Follow-up: D     Orthopedic surgery staff for this patient is Dr. Meyers.     Kieran Mooer MD  Orthopaedic Surgery PGY-4  [phone]    Please page me at [phone] with any questions/concerns. If there is no response, if it is a weekend, or if it is during evening hours then please page the orthopaedic surgery resident on call.       Future Appointments   Date Time Provider Department [place]   12/22/2022  1:00 PM Aisha Machuca PT [facility] [place]   12/26/2022  7:00 PM UR [facility] [place]

## 2022-12-23 NOTE — PLAN OF CARE
Patient is A&Ox4, able to make needs known, using call light appropriately.  VSS, LS clear. CMS intact, Neuros are intact. Pain well controlled, not requesting pain medication at this time. BS present, Patient is passing gas, no bm this shift, refused senna and mirilax, stated his stool yesterday was soft/loose. Voiding spontaneously in the urinal. Tolerating regular diet, BG was checked after the patient finished lunch, no insulin was given. Denies dizziness, SOB & CP. IV SL. Skin abrasion/fungal rash to patients bottom assessed by WOC and this RN, cleansed with perineal cream and antifungal powder applied, encouraged repositioning, clean/dry chux was placed under the patient. Patient was scheduled to see PT today, they were unable to see the patient, this nurse encouraged getting to the chair, was pre-medicated and patient was not wanting to get to the chair, was able to lift patient in the sling and do full linen change with assist of 2. Using IS independently at bedside. Covid swab obtained, plan is to discharge to [facility] possibly tomorrow, continue POC.

## 2022-12-23 NOTE — PROGRESS NOTES
Orthopaedic Surgery Progress Note 12/23/2022    Interval Events  - AFVSS  - Discharge planning -- TCU    Subjective  No acute events overnight.  Pain controlled, stable compared to previous. Denies new numbness, tingling, or weakness.  Tolerating diet without nausea or vomiting.  Voiding spontaneously.  +flatus, +BM.  No new questions or concerns.    Counseled on trying to maintain optimistic outlook amidst hospitalization during holiday season    Objective  Temp: 98.4  F (36.9  C) Temp src: Oral BP: 130/61 Pulse: 73   Resp: 16 SpO2: 95 % O2 Device: None (Room air)      Exam:  General: NAD. Resting comfortably in bed.  Respiratory: Breathing comfortably on RA.  Musculoskeletal:     Focused Exam of Left Lower Extremity:  - Incision healing well, Steri-Strips in place. Glue beneath.  - Fires EHL, FHL, TA, GSC  - SILT DP, SP, Saph, Sural, Tibial Nerve Distributions  - 1+ DP pulse, foot WWP. 1+ edema leg.      Drain removed on 12/6    Recent Labs   Lab 12/21/22  0833 12/19/22  0905 12/18/22  0708   WBC 9.3  --  9.1   HGB 9.4*  --  9.7*     --  344   CRP  --  6.1  --        Assessment: Waldo Bustos is a 72 year old male s/p Explantation of Chronic  Infected Left Total Hip Arthroplasty, Extended Trochanteric Osteotomy with Extensive Debridement and Reconstruction Using Antibiotic Cement Spacer on 11/22/2022 with Dr. Meyers.     Plan:  12/23:   - Out of bed, to chair daily  - ID final recommendations in (see below), started on PO Abx on 12/1  -- Discharge planning to TCU  -- Okay to discharge from orthopaedics perspective     Cultures: Proteus mirabilis     ----Infectious disease recommendations below----  -po ciprofloxacin 500mg BID and po metronidazole 500mg TID  Duration of total 6 weeks, from 11/22/2022.   -follow up with ID in 5 weeks, ideally ~1 week prior to completion of antibiotics        Activity: Up with assist and assistive devices as needed until independent.  Weight bearing status: NWB  LLE  Antibiotics: Per ID, oral ciprofloxacin 500 mg bid and flagyl 500 mg TID   Diet: Begin with clear fluids and progress diet as tolerated. Bowel regimen. Anti-emetics PRN.    DVT prophylaxis: Initially on  mg (postop), then Eliquis 2.5 mg BID x2 weeks after surgery (started on 11/28), NOW ON 5 mg BID PTA, initially held for anemia  Elevation: Elevate heels off of bed on pillows   Wound Care: Open to air.  Drains: 15f GUERRERO drain REMOVED on 12/6  Tran: Removed POD1  Pain management: Orals PRN, IV for breakthrough only  X-rays: AP Pelvis and lateral in PACU complete  Physical Therapy: Mobilization, ROM, ADL's  Occupational Therapy: ADL's  Labs: Trend Hgb on POD #1, 2  Consults: PT, OT. ID. Hospitalist, appreciate assistance in caring for this patient throughout the perioperative period-      Disposition: Pending placement; anticipate discharge to likely TCU. Hb stable.  ID plan in place.   Follow-up: TBD     Orthopedic surgery staff for this patient is Dr. Meyers.    Kieran Moore MD  Orthopaedic Surgery PGY-4  [phone]    Please page me at [phone] with any questions/concerns. If there is no response, if it is a weekend, or if it is during evening hours then please page the orthopaedic surgery resident on call.       Future Appointments   Date Time Provider Department [place]   12/23/2022  3:45 PM Aisha Machuca, PT [facility] [place]   12/26/2022  7:00 PM UR OT WAITLIST [facility] [place]

## 2022-12-23 NOTE — PROGRESS NOTES
SPIRITUAL HEALTH SERVICES Progress Note  [facility]    Attempted a visit att 2:35pm but patient was asleep.     Quincy Schneider MDiv  Chaplain Resident  Pager [phone]      * [facility] remains available 24/7 for emergent requests/referrals, either by having the switchboard page the on-call  or by entering an ASAP/STAT consult in Epic (this will also page the on-call ). Routine Epic consults receive an initial response within 24 hours.*

## 2022-12-23 NOTE — DISCHARGE SUMMARY
[facility]  Hospitalist Discharge Summary      Date of Admission:  11/22/2022  Date of Discharge:  12/23/2022  Discharging Provider: Gi Bean MD  Discharge Service: Hospitalist Service, GOLD TEAM 19    Discharge Diagnoses   # Chronically infected left total hip arthroplasty  # S/p explantation + extended debridement/reconstruction with antibiotic cement spacer on 11/22/22  # Acute Encephalopathy  # Normocytic anemia (?anemia of chronic disease) with acute blood loss anemia  # Acute hypoxemic respiratory failure, resolved   # HALEY: Resolved  # Constipation  # JAIR with obesity  #DM type 2  #CAD  # History of DVT/PE  # Venous insufficiency   # Lack of motivation  # Groins, abd folds, buttocks- Fungal infection    Follow-ups Needed After Discharge   Follow-up Appointments     Adult [facility] Follow-up and recommended labs and tests      Follow up with Dr Meyers as scheduled. Clinic phone number is   Follow up with ID in 1 week     Appointments on [place] and/or [facility] (with [facility] or [facility]   provider or service). Call [phone] if you haven't heard regarding   these appointments within 7 days of discharge.             Discharge Disposition   Discharged to short-term care facility  Condition at discharge: Stable    Hospital Course   72 year old male with past medical history of osteoarthritis, DM2, DVT/PE (3-4 years ago, on eliquis), obesity, JAIR who was admitted post-operatively after explantation of chronically infected left total hip arthroplasty + extended debridement/reconstruction using antibiotic cement spacer 11/22/22.  Internal Medicine was consulted for post-operative medical management.      # Chronically infected left total hip arthroplasty  # S/p explantation + extended debridement/reconstruction with antibiotic cement spacer on 11/22/22  - Wound culture from 11/22: Finegoldia, Proteus Mirabilis  - Antibiotics: Was on iv  Vancomycin, and Zosyn initially. Now on Ciprofloxacin and Metronidazole. - Duration of treatment is a total of 6 weeks, from 11/22 - 1/3/23.   - Slow post op progress/recovery with therapy.   - Baseline EKG for monitoring with baseline QTc at 443  - Follow up with outpatient ID clinic, around 1 week prior to completion of antibiotics. Should see either Tracy Mcqueen, Nikko, or Moncho  - Defer further management to Ortho    - Activity: Up with assist and assistive devices as needed until independent.  - Weight bearing status: NWB LLE     # Acute Encephalopathy  - Non focal.   - Suspect toxic metabolic.   - Had new onset of confusion. Patient seem to recall why he is here. No fevers   - New meds started are Cipro and Metronidazole. Sparing use of Oxycodone.   - Labs largely within normal limits  - Resolved.    - Delirium precautions.   - Continue monitoring      # Normocytic anemia (?anemia of chronic disease) with acute blood loss anemia  Pre-operatively had hgb of 9.9 on 11/14. Patient has received 4 units of prbc since admission. Hemoglobin has since stabilized ~9.0  - Transfuse if <7  - Outpatient w/u once acute illness resolves  - Continue to monitor while on eliquis.      # Acute hypoxemic respiratory failure, resolved   Most largely d/t  hypoventilation with background resistive lung disease given body habitus. VBG unremarkable w/o hypercarbia.  Lungs largely clear to auscultation bilaterally. No oxygen at home.   - Incentive spirometry, monitor respiratory rate with continuous oximetry  - Supplement oxygen as needed.       # HALEY: Resolved  Likely pre-renal given volume loss; though patient appears hypervolemic  Mildly elevated creatinine on the higher side of his recent values  Cr now 0.9  - Furosemide on hold, resumed at discharge   - Monitor BMP      # Constipation:  On Daria-Colace twice daily, MiraLAX daily, Lactulose 3 times daily, Milk of mag as needed  - Continue above regimen     # JAIR with obesity  -  home CPAP     # DM2  PTA med list includes metformin, glipizide, pioglitazone, victoza  Was on Insulin Aspart correction, which has now been discontinued. Started back on Metformin (2 gms daily) and Pioglitazone 45 mg. A1c 6.8 this admission.  At discharge Glipizide and Victoza has been resumed   sliding scale insulin stopped at discharge               Recent Labs   Lab 12/23/22  0823 12/23/22  0204 12/22/22  2207 12/22/22  1706 12/22/22  1444 12/22/22  1208   * 129* 154* 202* 147* 179*          # CAD:  - PTA on Atorvastatin and AEliquis  - Continue PTA atorvastatin  -Now on full dose of Eliquis     # History of DVT/PE  Previously on DOAC (eliquis) but this was held perioperatively.    - Initially on  mg (postop), then Eliquis 2.5 mg BID x2 weeks after surgery (started on 11/28) and now back on 5 mg BID  - Continue Eliquis 5 mg po bid       # Venous insufficiency with stasis dermatitis:   Erythematous patches on bilateral lower extremities   - Left anterior lower leg and smaller area on right lower leg.   - Low suspicion for cellulitis and more consistent with chronic stasis dermatitis  - PTA med list includes furosemide which was on hold and resumed at discharge   - Compression stockings     # Lack of motivation  - depression screen negative  -Trial of low dose ritalin (not home med) was not found to be helpful     # Hypocalcemia Resolved   # Hyperkalemia- mild and intermittent on the intra-op labs. Asymptomatic  -Resolved      # Groins, abd folds, buttocks- redness:   WOCN consultation  Miconazole powder.   Further care per WOCN         Consultations This Hospital Stay   INFECTIOUS DISEASE [facility] ADULT IP CONSULT  INTERNAL MEDICINE ADULT IP CONSULT FOR [facility]  PHYSICAL THERAPY ADULT IP CONSULT  OCCUPATIONAL THERAPY ADULT IP CONSULT  PHARMACY TO DOSE [facility]  INFECTIOUS DISEASE [facility] ADULT IP CONSULT  CARE MANAGEMENT / SOCIAL WORK IP CONSULT  PHYSICAL THERAPY ADULT IP  CONSULT  OCCUPATIONAL THERAPY ADULT IP CONSULT  PAIN MANAGEMENT ADULT IP CONSULT  WOUND OSTOMY CONTINENCE NURSE  IP CONSULT    Code Status   Full Code    Time Spent on this Encounter   I, Gi Bean MD, personally saw the patient today and spent greater than 30 minutes discharging this patient.       Gi Bean MD  [facility] MED SURG  [facility] [address]  Phone: [phone]  Fax: [phone]  ______________________________________________________________________    Physical Exam   See separate progress note for discharge day        Primary Care Physician   YUE MEDLEY    Discharge Orders      Reason for your hospital stay    Waldo Bustos is a 72 year old male s/p Explantation of Chronic  Infected Left Total Hip Arthroplasty, Extended Trochanteric Osteotomy with Extensive Debridement and Reconstruction Using Antibiotic Cement Spacer on 11/22/2022 with Dr. Meyers.     Activity    Your activity upon discharge: activity as tolerated       Activity: Up with assist and assistive devices as needed until independent.  Weight bearing status: NWB LLE     When to contact your care team    Call Dr Meyers  if you have any of the following: temperature greater than 101.3  or less than 96.5,  increased shortness of breath, increased drainage, increased swelling, or increased pain.     Wound care and dressings    Instructions to care for your wound at home: ice to area for comfort, keep wound clean and dry, and may get incision wet in shower but do not soak or scrub.     Mantoux instructions    Give two-step Mantoux (PPD) Per Facility Policy Yes     General info for SNF    Length of Stay Estimate: Short Term Care: Estimated # of Days <30  Condition at Discharge: Improving  Level of care:skilled   Rehabilitation Potential: Excellent  Admission H&P remains valid and up-to-date: Yes  Recent Chemotherapy: N/A  Use Nursing Home Standing Orders: Yes      Adult [facility] Follow-up and recommended labs and tests    Follow up with Dr Meyers as scheduled. Clinic phone number is   Follow up with ID in 1 week     Appointments on [place] and/or [facility] (with [facility] or [facility] provider or service). Call [phone] if you haven't heard regarding these appointments within 7 days of discharge.     Physical Therapy Adult Consult    Evaluate and treat as clinically indicated.    Reason:  s/p left hip surgery     Occupational Therapy Adult Consult    Evaluate and treat as clinically indicated.    Reason:s/p left hip surgery     Diet    Follow this diet upon discharge: Orders Placed This Encounter      Advance Diet as Tolerated: Regular Diet Adult           Discharge Medications   Current Discharge Medication List      START taking these medications    Details   ciprofloxacin (CIPRO) 500 MG tablet Take 1 tablet (500 mg) by mouth every 12 hours    Associated Diagnoses: Prosthetic joint infection, initial encounter (H)      lactobacillus rhamnosus, GG, (CULTURELL) capsule Take 1 capsule by mouth 2 times daily    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      methocarbamol (ROBAXIN) 500 MG tablet Take 1 tablet (500 mg) by mouth every 6 hours as needed for muscle spasms    Associated Diagnoses: Prosthetic joint infection, initial encounter (H)      metroNIDAZOLE (FLAGYL) 500 MG tablet Take 1 tablet (500 mg) by mouth 3 times daily    Associated Diagnoses: Prosthetic joint infection, initial encounter (H)      miconazole (MICATIN) 2 % external powder Apply topically 2 times daily    Associated Diagnoses: Fungal infection      oxyCODONE (ROXICODONE) 5 MG tablet Take 1 tablet (5 mg) by mouth every 4 hours as needed for moderate pain (4-6)  Qty: 24 tablet, Refills: 0    Associated Diagnoses: Prosthetic joint infection, initial encounter (H)      polyethylene glycol (MIRALAX) 17 g packet Take 17 g by mouth daily    Associated Diagnoses: Prosthetic joint  infection, initial encounter (H)      senna-docusate (SENOKOT-S/PERICOLACE) 8.6-50 MG tablet Take 2 tablets by mouth 2 times daily    Associated Diagnoses: Prosthetic joint infection, initial encounter (H)         CONTINUE these medications which have CHANGED    Details   acetaminophen (TYLENOL) 325 MG tablet Take 2 tablets (650 mg) by mouth every 4 hours as needed for other (For optimal non-opioid multimodal pain management to improve pain control.)    Associated Diagnoses: Prosthetic joint infection, initial encounter (H)      !! apixaban ANTICOAGULANT (ELIQUIS) 2.5 MG tablet Take 1 tablet (2.5 mg) by mouth 2 times daily    Associated Diagnoses: Prosthetic joint infection, initial encounter (H)      !! apixaban ANTICOAGULANT (ELIQUIS) 5 MG tablet Take 1 tablet (5 mg) by mouth 2 times daily    Associated Diagnoses: Prosthetic joint infection, initial encounter (H)       !! - Potential duplicate medications found. Please discuss with provider.      CONTINUE these medications which have NOT CHANGED    Details   atorvastatin (LIPITOR) 40 MG tablet Take 40 mg by mouth daily      BD CEM U/F 32G X 4 MM insulin pen needle       blood glucose (ACCU-CHEK DEDRICK PLUS) test strip TEST BLOOD SUGARS 3 TIMES A DAY. E11.622 SKIN ULCER, HIGH A1C      blood glucose monitoring (NO BRAND SPECIFIED) meter device kit       COMPRESSION STOCKINGS For personal use. Length:calf FARROW WRAPS BILATERALLY      furosemide (LASIX) 20 MG tablet Take 20 mg by mouth daily      glipiZIDE (GLUCOTROL XL) 10 MG 24 hr tablet Take 10 mg by mouth daily      metFORMIN (GLUCOPHAGE XR) 500 MG 24 hr tablet Take 2,000 mg by mouth every morning      Misc. Devices (WALKER AUTO GLIDES) MISC 2 Wheeled Walker for home use. For 6 weeks.      pioglitazone (ACTOS) 45 MG tablet Take 45 mg by mouth daily      VICTOZA PEN 18 MG/3ML soln Inject 1.8 mg Subcutaneous daily         STOP taking these medications       amoxicillin (AMOXIL) 500 MG capsule Comments:   Reason for  Stopping:             Allergies   Allergies   Allergen Reactions     Sulfa Drugs      Other reaction(s): *Unknown - Childhood Rxn     Tizanidine Other (See Comments)     Frequent urination; causes drowsiness, and dry mouth

## 2022-12-23 NOTE — PROGRESS NOTES
Care Management Follow Up    Length of Stay (days): 31    Expected Discharge Date: 12/23/2022     Concerns to be Addressed:       Patient plan of care discussed at interdisciplinary rounds: Yes    Anticipated Discharge Disposition: Transitional Care     Anticipated Discharge Services:    Anticipated Discharge DME:      Patient/family educated on Medicare website which has current facility and service quality ratings:    Education Provided on the Discharge Plan:    Patient/Family in Agreement with the Plan:      Referrals Placed by CM/SW:    Private pay costs discussed: Not applicable    Additional Information:  Patient was not able to go to [facility] today as they did not have the staffing.  He is a hopeful for tomorrow.    Added to weekend list for follow up with [facility] for bed tomorrow.   Don notified of status.   Lavonne Baez, ZEB to do PAS.    Sonya Avilez RN, BA  Care Coordinator  6 Med Surg  [phone], pager [phone]      For weekend social work needs, contact information below and available on [facility]:      Weekend [place] Pager: [phone]   Weekend 6MS, 8A, 10ICU- Pager: [phone]     For weekend RN care coordinator needs (home discharge with needs including home care, assisted living facility returns, durable medical equip, IV antibiotics) - page [phone].

## 2022-12-23 NOTE — PLAN OF CARE
No significant changes. Pt is A&Ox4. CMS intact. Denies nausea,vomiting, SOB, and chest pain. Lung sounds is clear.  Up w/ 2A and cycling lift. voiding adequate amounts via urinal LBM:12/23 large  Pain: 6/10- managed with prn oxycodone  Left PIV SL  Continue with POC and pending TCU placement.

## 2022-12-23 NOTE — PROGRESS NOTES
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 Internal Medicine was consulted for post-operative medical management.     Areas Assessed:      Areas visualized during today's visit: Bilateral buttocks, skin folds    Patient continues to be difficult to assess as he is not tolerating turning, although improved since one week ago. Patient was able to turn and [facility] is able to see skin on buttocks. Buttocks and perineum assessed and patient does have denuded skin on bilateral buttocks as well as small skin tears related to moisture.  [facility] was not able to appreciate any pressure injury to sacrum or coccyx or buttocks on assessment today.     Patient does have redness in skin folds most likely due to moisture. See plan of care below.     Patient will need to be educated frequently on the importance of turning and repositioning in order to care for skin.     Treatment Plan:     Buttocks wound(s): Follow Adult Incontinence Policy:     Cleanse the area with Marily cleanse and protect, very gently with soft cloth.    Apply ostomy powder (#3472) on all open and denuded skin if present.    Apply thin layer of critic aid paste on top of it.    With repeat application, do not scrub the paste, only remove soiled paste and reapply.    If complete removal of paste is necessary use baby oil/mineral oil (#273533) and soft wash cloth.    Ensure pt has Isaac-cushion (#428349) while sitting up in the chair.    Use only one Covidien pad in  between mattress and pt. No brief while in bed.    Abdominal and groin skin folds: Wash daily with soap and water. Dry thoroughly. Dust skin with miconazole powder.    Orders: Reviewed    RECOMMEND PRIMARY TEAM ORDER: Antifungal message sent to Hospitalist  Education provided: importance of repositioning and plan of care  Discussed plan of care with: Patient, Nurse and Physician  WOC nurse follow-up plan: weekly  Notify WOC if wound(s) deteriorate.  Nursing to notify the Provider(s) and re-consult the WOC Nurse if new skin concern.    DATA:     Current support surface: Standard  Standard gel/foam mattress (IsoFlex, Atmos air, etc)  Containment of urine/stool: Incontinent pad in bed  BMI: Body mass index is 46.53 kg/m .   Active diet order: Orders Placed This Encounter      Advance Diet as Tolerated: Regular Diet Adult      Diet     Output: No intake/output data recorded.     Labs:   Recent Labs   Lab 12/21/22  0833 12/19/22  0905   HGB 9.4*  --    WBC 9.3  --    CRP  --  6.1     Pressure injury risk assessment:   Sensory Perception: 4-->no impairment  Moisture: 3-->occasionally moist  Activity: 2-->chairfast  Mobility: 2-->very limited  Nutrition: 3-->adequate  Friction and Shear: 2-->potential problem  Rafael Score: 16    Kristen Hadley RN CWOCN  Dept. Pager: [phone]  Dept. Office Number: [phone]

## 2022-12-23 NOTE — PROGRESS NOTES
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  Internal Medicine was consulted for post-operative medical management.     # Chronically infected left total hip arthroplasty  # S/p explantation + extended debridement/reconstruction with antibiotic cement spacer on 11/22/22  - Wound culture from 11/22: Finegoldia, Proteus Mirabilis  - Antibiotics: Was on iv Vancomycin, and Zosyn initially. Now on Ciprofloxacin and Metronidazole. - Duration of treatment is a total of 6 weeks, from 11/22 - 1/3/23.   - Slow post op progress/recovery with therapy.   - Baseline EKG for monitoring with baseline QTc at 443  - Follow up with outpatient ID clinic, around 1 week prior to completion of antibiotics. Should see either Tracy Mcqueen, Nikko, or Moncho  - Defer further management to Ortho    - Activity: Up with assist and assistive devices as needed until independent.  - Weight bearing status: NWB LLE    # Acute Encephalopathy  - Non focal.   - Suspect toxic metabolic.   - Had new onset of confusion. Patient seem to recall why he is here. No fevers   - New meds started are Cipro and Metronidazole. Sparing use of Oxycodone.   - Labs largely within normal limits  - Resolved.    - Delirium precautions.   - Continue monitoring      # Normocytic anemia (?anemia of chronic disease) with acute blood loss anemia  Pre-operatively had hgb of 9.9 on 11/14. Patient has received 4 units of prbc since admission. Hemoglobin has since stabilized ~9.0  - Transfuse if <7  - Outpatient w/u once acute illness resolves  - Continue to  monitor while on eliquis.      # Acute hypoxemic respiratory failure, resolved   Most largely d/t  hypoventilation with background resistive lung disease given body habitus. VBG unremarkable w/o hypercarbia.  Lungs largely clear to auscultation bilaterally. No oxygen at home.   - Incentive spirometry, monitor respiratory rate with continuous oximetry  - Supplement oxygen as needed.       # HALEY: Resolved  Likely pre-renal given volume loss; though patient appears hypervolemic  Mildly elevated creatinine on the higher side of his recent values  Cr now 0.9  - Furosemide on hold.   - Monitor BMP      # Constipation:  On Daria-Colace twice daily, MiraLAX daily, Lactulose 3 times daily, Milk of mag as needed  - Continue above regimen     # JAIR with obesity  - home CPAP     # DM2  PTA med list includes metformin, glipizide, pioglitazone, victoza  Was on Insulin Aspart correction, which has now been discontinued. Started back on Metformin (2 gms daily) and Pioglitazone 45 mg. A1c 6.8 this admission.  - Continue current regimen  - Insulin Medium intensity correction    Recent Labs   Lab 12/23/22  0823 12/23/22  0204 12/22/22  2207 12/22/22  1706 12/22/22  1444 12/22/22  1208   * 129* 154* 202* 147* 179*         # CAD:  - PTA on Atorvastatin and Aspirin  - Continue PTA atorvastatin  -Continue holding PTA ASA while on Eliquis     # History of DVT/PE  Previously on DOAC (eliquis) but this was held perioperatively.    - Initially on  mg (postop), then Eliquis 2.5 mg BID x2 weeks after surgery (started on 11/28)  - Continue Eliquis 5 mg po bid  - Continue holding PTA ASA     # Venous insufficiency with stasis dermatitis:   Erythematous patches on bilateral lower extremities   - Left anterior lower leg and smaller area on right lower leg.   - Low suspicion for cellulitis and more consistent with chronic stasis dermatitis  - PTA med list includes furosemide which is on hold  - Compression stockings     # Lack of  motivation  - depression screen negative  -Trial of low dose ritalin (not home med)    # Hypocalcemia Resolved   # Hyperkalemia- mild and intermittent on the intra-op labs. Asymptomatic  -Resolved     # Groins, abd folds, buttocks- redness:   WOCN consultation  Miconazole powder.   Further care per WOCN          Diet: Advance Diet as Tolerated: Regular Diet Adult  Diet    DVT Prophylaxis: DOAC  Tran Catheter: Not present  Central Lines: None  Cardiac Monitoring: None  Code Status: Full Code    Disposition Plan   Awaiting for placement       The patient's care was discussed with the Care Coordinator/, Patient and RN.          Gi Bean MD  Hospitalist Service, GOLD TEAM 19  [facility]  Securely message with the Vocera Web Console (learn more here)  Text page via [employer] Paging/Directory   Please see signed in provider for up to date coverage information      ______________________________________________________________________    Interval History   Uneventful night  No acute events    eating and drinking well   discussed the need to exert oneself with therapy for improvement in mobility     Data reviewed today: I reviewed all medications, new labs and imaging results over the last 24 hours. I personally reviewed no images or EKG's today.    Physical Exam   Vital Signs: Temp: 98.6  F (37  C) Temp src: Oral BP: (!) 144/77 Pulse: 78   Resp: 16 SpO2: 92 % O2 Device: None (Room air)    Weight: 324 lbs 4.75 oz    General Appearance: Awake, interactive, NAD, obese.   Respiratory: Normal work of breathing.  Cardiovascular: RRR   GI: Soft. NT. ND.  Extremities : distally wwp. No pedal edema.   Skin: per Ortho: incision healing well.   Neuro: Alert oriented. Grossly non focal.   Others: Stable mood.       Data   Recent Labs   Lab 12/23/22  0823 12/23/22  0204 12/22/22  2207 12/21/22  1120 12/21/22  0833 12/18/22  1300 12/18/22  0708   WBC  --   --    --   --  9.3  --  9.1   HGB  --   --   --   --  9.4*  --  9.7*   MCV  --   --   --   --  91  --  94   PLT  --   --   --   --  325  --  344   NA  --   --   --   --  137  --  138   POTASSIUM  --   --   --   --  4.5  --  4.3   CHLORIDE  --   --   --   --  108  --  107   CO2  --   --   --   --  23  --  28   BUN  --   --   --   --  28  --  21   CR  --   --   --   --  0.99  --  1.19   ANIONGAP  --   --   --   --  6  --  3   MAIKOL  --   --   --   --  8.6  --  8.4*   * 129* 154*   < > 156*   < > 124*    < > = values in this interval not displayed.     No results found for this or any previous visit (from the past 24 hour(s)).  Medications       apixaban ANTICOAGULANT  5 mg Oral BID     atorvastatin  40 mg Oral Daily     ciprofloxacin  500 mg Oral Q12H [facility] (08/20)     [Held by provider] furosemide  20 mg Oral Daily     insulin aspart  1-7 Units Subcutaneous TID AC     insulin aspart  1-5 Units Subcutaneous At Bedtime     lactobacillus rhamnosus (GG)  1 capsule Oral BID     metFORMIN  2,000 mg Oral QAM     metroNIDAZOLE  500 mg Oral TID     miconazole   Topical BID     pioglitazone  45 mg Oral Daily     polyethylene glycol  17 g Oral Daily     senna-docusate  2 tablet Oral BID     sodium chloride (PF)  3 mL Intracatheter Q8H

## 2022-12-23 NOTE — PLAN OF CARE
"VS: /61   Pulse 73   Temp 98.4  F (36.9  C) (Oral)   Resp 16   Ht 1.778 m (5' 10\")   Wt 147.1 kg (324 lb 4.8 oz)   SpO2 95%   BMI 46.53 kg/m     O2: SpO2 95% on RA. LS clear. No sob. No chest pain.    Output: Continent bowel and bladder; uses urinal at bedside. Uses brief.    Last BM: Large bowel movement on 12/22/2022   Activity: Ax2 with cycling lift.    Skin: Steri strips on left hip; surrounding area dry and open to air.    Pain: C/o of left leg pain; pt has PRN Tylenol and Oxycodone for pain management   CMS: Intact. A&Ox4; calm and cooperative. Denies n/t   Diet: Regular diet   LDA: PIV left lower forearm; SL    Plan: TCU placement pending           "

## 2022-12-24 PROBLEM — T84.50XA PROSTHETIC JOINT INFECTION (H): Status: ACTIVE | Noted: 2022-01-01

## 2022-12-24 NOTE — LETTER
Recipient: [facility]      Sender: NEREYDA Shaffer ([phone])  Danielle NEREYDA Moreira ([phone])        Date: February 8, 2023  Patient Name:  Waldo Bustos  Patient YOB: 1950  Routing Message: Please see referral for custodial placement for pt. Please call with any questions and thank you for considering!        The documents accompanying this notice contain confidential information belonging to the sender.  This information is intended only for the use of the individual or entity named above.  The authorized recipient of this information is prohibited from disclosing this information to any other party and is required to destroy the information after its stated need has been fulfilled, unless otherwise required by state law.    If you are not the intended recipient, you are hereby notified that any disclosure, copy, distribution or action taken in reliance on the contents of these documents is strictly prohibited.  If you have received this document in error, please return it by fax to [phone] with a note on the cover sheet explaining why you are returning it (e.g. not your patient, not your provider, etc.).  If you need further assistance, please call .  Documents may also be returned by mail to [employer], , [address], [place], [place] [address].

## 2022-12-24 NOTE — LETTER
Recipient: [facility]      Sender: ZEB Shaffer [facility] ([phone])  [email or web address]      Date: February 16, 2023  Patient Name:  Waldo Bustos  Patient YOB: 1950  Routing Message:  Please see attached [facility] referral. EW/MA pending. Please call with any questions or concerns. Thank you for considering.        The documents accompanying this notice contain confidential information belonging to the sender.  This information is intended only for the use of the individual or entity named above.  The authorized recipient of this information is prohibited from disclosing this information to any other party and is required to destroy the information after its stated need has been fulfilled, unless otherwise required by state law.    If you are not the intended recipient, you are hereby notified that any disclosure, copy, distribution or action taken in reliance on the contents of these documents is strictly prohibited.  If you have received this document in error, please return it by fax to [phone] with a note on the cover sheet explaining why you are returning it (e.g. not your patient, not your provider, etc.).  If you need further assistance, please call .  Documents may also be returned by mail to [employer], , [address], [place], [place] [address].

## 2022-12-24 NOTE — PROGRESS NOTES
Patient was seen, course reviewed with nursing staff and team.  Patient hopes to discharge to [facility] today.  Please see discharge summary of Dr. Angelo from 12/23/2022    Patient reports feeling fine, has no new complaints.  Pain has been controlled  Denies cough, chest pain, shortness of breath.  Appetite fair      Vital signs stable, afebrile  Blood glucoses 100s    Patient is lying in bed, appears comfortable  Fully oriented  Lungs clear  CV rrr  Abd soft  Trace ankle edema L calf  Chronic venous stasis changes      Assessment         # Chronically infected left total hip arthroplasty  # S/p explantation + extended debridement/reconstruction with antibiotic cement spacer on 11/22/22.  On Ciprofloxacin and Metronidazole. - Duration of treatment is a total of 6 weeks, from 11/22 - 1/3/23  Activity per ortho  ID clinic f/u    Acute Encephalopathy  Resolved    Anemia, likely secondary to infection and surgery, stable  Plan monitor    Acute hypoxemic respiratory failure, resolved   No 02 requirement    HALEY, likely prerenal, resolved  PTA furosemide 20 mg daily resumed  Plan monitor BMP    DM2  PTA on  metformin, glipizide, pioglitazone, victoza  HgbA1c 6.8 on admission  Currently on actos and metformin alone  Plan adjust medications as BG dictates    # CAD:  - PTA on Atorvastatin and Aspirin  - Continue PTA atorvastatin  PTA furosemide 20 mg daily resumed  -Continue holding PTA ASA while on Eliquis     # History of DVT/PE  Previously on DOAC (eliquis) but this was held perioperatively.    - Initially on  mg (postop), then Eliquis 2.5 mg BID x2 weeks after surgery (started on 11/28)  - Continue Eliquis 5 mg po bid  - Continue holding PTA ASA      # JAIR with obesity  - home CPAP    Cornelio Frausto MD

## 2022-12-24 NOTE — LETTER
Recipient: Ricarda Mcguire    Sender: Eder NEREYDA Melendez ([phone]) [email or web address]    Date: February 14, 2023  Patient Name:  Waldo Bustos  Patient YOB: 1950  Routing Message: Please see attached jail referral. Please call with any questions, and thank you for considering!        The documents accompanying this notice contain confidential information belonging to the sender.  This information is intended only for the use of the individual or entity named above.  The authorized recipient of this information is prohibited from disclosing this information to any other party and is required to destroy the information after its stated need has been fulfilled, unless otherwise required by state law.    If you are not the intended recipient, you are hereby notified that any disclosure, copy, distribution or action taken in reliance on the contents of these documents is strictly prohibited.  If you have received this document in error, please return it by fax to [phone] with a note on the cover sheet explaining why you are returning it (e.g. not your patient, not your provider, etc.).  If you need further assistance, please call .  Documents may also be returned by mail to [employer], , [address], [place], [place] [address].

## 2022-12-24 NOTE — LETTER
Recipient: [facility]    Sender: Diana Melendez ([phone])  [email or web address]    Date: February 16, 2023  Patient Name:  Waldo Bustos  Patient YOB: 1950  Routing Message:  Please see attached [facility] referral. EW/MA pending. Please call with any questions or concerns. Thank you for considering.        The documents accompanying this notice contain confidential information belonging to the sender.  This information is intended only for the use of the individual or entity named above.  The authorized recipient of this information is prohibited from disclosing this information to any other party and is required to destroy the information after its stated need has been fulfilled, unless otherwise required by state law.    If you are not the intended recipient, you are hereby notified that any disclosure, copy, distribution or action taken in reliance on the contents of these documents is strictly prohibited.  If you have received this document in error, please return it by fax to [phone] with a note on the cover sheet explaining why you are returning it (e.g. not your patient, not your provider, etc.).  If you need further assistance, please call .  Documents may also be returned by mail to [employer], , [address], [place], [place] [address].

## 2022-12-24 NOTE — LETTER
Recipient: [facility]      Sender: NEREYDA Shaffer ([phone])  Danielle NEREYDA Moreira ([phone])        Date: February 8, 2023  Patient Name:  Waldo Bustos  Patient YOB: 1950  Routing Message: Please see referral for retirement placement for pt. Please call with any questions and thank you for considering!        The documents accompanying this notice contain confidential information belonging to the sender.  This information is intended only for the use of the individual or entity named above.  The authorized recipient of this information is prohibited from disclosing this information to any other party and is required to destroy the information after its stated need has been fulfilled, unless otherwise required by state law.    If you are not the intended recipient, you are hereby notified that any disclosure, copy, distribution or action taken in reliance on the contents of these documents is strictly prohibited.  If you have received this document in error, please return it by fax to [phone] with a note on the cover sheet explaining why you are returning it (e.g. not your patient, not your provider, etc.).  If you need further assistance, please call .  Documents may also be returned by mail to [employer], , [address], [place], [place] [address].

## 2022-12-24 NOTE — PROGRESS NOTES
Care Management Discharge Note    Discharge Date: 12/23/2022    Discharge Disposition: [facility]    Discharge Services: Rehab services at TCU    Discharge DME:  WC/walker    Discharge Transportation: family or friend will provide (pt reluctant to ask his brother as his too off work to drive him to the hospital. Made pt aware that Medicare does not cover transportation.)    Private pay costs discussed: Not applicable    PAS Confirmation Code: [id number]  Patient/family educated on Medicare website which has current facility and service quality ratings:      Education Provided on the Discharge Plan:  Yes  Persons Notified of Discharge Plans: Yes  Patient/Family in Agreement with the Plan: Yes     Handoff Referral Completed: Yes    Additional Information:  Called staff at [facility] regarding pt admitting to their unit. They stated pt is on waitlist and waiting for hear from charge RN on their staffing for the day. Requested to call back at 10:30am for an update ([phone]).    Called [facility] staff back. Pt is accepted today for admission. WC rollever requested for 12:30PM. Updated RN.     JO ANN Cardoso   [facility]  Phone: [phone]    For weekend social work needs, contact information below and available on [employer]:     Weekend [place] Pager: [phone]   Weekend 6MS, 8A, 10ICU- Pager: [phone]    For weekend RN care coordinator needs (home discharge with needs including home care, assisted living facility returns, durable medical equip, IV antibiotics) - page [phone].           JO ANN Cardoso   [facility]  Phone: [phone]    For weekend social work needs, contact information below and available on Am[employer]:     Weekend [facility] Pager: [phone]   Weekend 6MS, 8A, 10ICU- Pager: [phone]    For weekend RN care coordinator needs (home discharge with needs including home care, assisted living facility returns,  [facility] medical [facility], IV antibiotics) - page [phone].

## 2022-12-24 NOTE — LETTER
Recipient: Rose @ [facility]       Sender: Diana Al -  @ [facility] ([phone])  [email or web address]      Date: March 13, 2023  Patient Name:  Waldo Bustos  Patient YOB: 1950  Routing Message: Please see attached group home referral. Pt is a wallace lift and is MA/EW pending. Please call with any questions and thank you for considering!        The documents accompanying this notice contain confidential information belonging to the sender.  This information is intended only for the use of the individual or entity named above.  The authorized recipient of this information is prohibited from disclosing this information to any other party and is required to destroy the information after its stated need has been fulfilled, unless otherwise required by state law.    If you are not the intended recipient, you are hereby notified that any disclosure, copy, distribution or action taken in reliance on the contents of these documents is strictly prohibited.  If you have received this document in error, please return it by fax to [phone] with a note on the cover sheet explaining why you are returning it (e.g. not your patient, not your provider, etc.).  If you need further assistance, please call .  Documents may also be returned by mail to [employer], , [address], [place], [place] [address].

## 2022-12-24 NOTE — PLAN OF CARE
"Goal Outcome Evaluation:         VS: /68 (BP Location: Right arm)   Pulse 70   Temp 97.8  F (36.6  C) (Oral)   Resp 16   Ht 1.778 m (5' 10\")   Wt 147.1 kg (324 lb 4.8 oz)   SpO2 94%   BMI 46.53 kg/m       O2: Room air    Output: Uses bedside urinal    Last BM: 12/23/22   Activity:                                                                                                   Up for meals? Yes    Skin: L. Hip incision with stapled CDI    Pain: Reports pain at rest 6/10. Pain managed with oral oxycodone .    CMS: A/O x4  - Denies chest pain , sob, n/t    Dressing: L. Hip incision CDI    Diet: Regular - BS check TID. 1 unit administered    LDA: L. PIV SL    Equipment: Personal items, call light within reach    Plan: Transferred to 4 Acute rehab at 1300 via cart.    Additional Info:        6MS TRANSFER OFF    D: Reason for transfer to [facility] 4 Acute rehab Condition of patient prior to transfer was stable , A/O x4 .     I: Report called to Reba Transferred to Acute rehab  at 1300 via cart. Chart and medications sent with patient. Belongings sent .                    "

## 2022-12-24 NOTE — CARE PLAN
Patient is a 72 year old male  admitted to room 407 via bed.  Patient is alert and oriented X 3. See Epic for VS and assessment.  Patient is able to transfer by ceiling lift-assist of 2. Patient was settled into their room, shown call light, tv, mealtimes etc. Oriented to unit. Will continue monitoring pain level and VS. Notifying MD with any concerns.  Follow MD orders for cares and medications.    Level of Schooling:college  Ethnicity:   Marital Status:  Dentures: No  Hearing Aid: No  Smoker:  No  Glasses: Yes  Occupation: musician  Falls 0-1 mo: 0 2-6 mo: 0  Stairs prior function: Needed some help  Prior device use: Other cane   [facility] Directive Referral to Social Work?No

## 2022-12-24 NOTE — PROGRESS NOTES
Orthopaedic Surgery Progress Note 12/24/2022    Interval Events  - AFVSS  - SW: possible [facility] today vs this weekend    Subjective  No acute events overnight.  Pain stable, controlled on current regimen. Denies new numbness, tingling, or weakness.  Tolerating diet without nausea or vomiting.  Voiding spontaneously.  +flatus, +BM    Objective  Temp: 97.5  F (36.4  C) Temp src: Oral BP: 122/57 Pulse: 73   Resp: 16 SpO2: 94 % O2 Device: None (Room air)      Exam:  General: NAD. Resting comfortably in bed.  Respiratory: Breathing comfortably on RA.  Musculoskeletal:     Focused Exam of Left Lower Extremity:  - Incision healing well, Steri-Strips in place. Glue beneath.  - Fires EHL, FHL, TA, GSC  - SILT DP, SP, Saph, Sural, Tibial Nerve Distributions  - 1+ DP pulse, foot WWP. 1+ edema leg.      Drain removed on 12/6    Recent Labs   Lab 12/21/22  0833 12/19/22  0905 12/18/22  0708   WBC 9.3  --  9.1   HGB 9.4*  --  9.7*     --  344   CRP  --  6.1  --        Assessment: Waldo Bustos is a 72 year old male s/p Explantation of Chronic  Infected Left Total Hip Arthroplasty, Extended Trochanteric Osteotomy with Extensive Debridement and Reconstruction Using Antibiotic Cement Spacer on 11/22/2022 with Dr. Meyers.     Plan:  12/24:   -- Discharge planning to TCU -- possible discharge to [facility] today  - Out of bed, to chair daily  - ID final recommendations in (see below), started on PO Abx on 12/1  -- Okay to discharge from orthopaedics perspective     Cultures: Proteus mirabilis     ----Infectious disease recommendations below----  -po ciprofloxacin 500mg BID and po metronidazole 500mg TID  Duration of total 6 weeks, from 11/22/2022.   -follow up with ID in 5 weeks, ideally ~1 week prior to completion of antibiotics        Activity: Up with assist and assistive devices as needed until independent.  Weight bearing status: NWB LLE  Antibiotics: Per ID, oral ciprofloxacin 500 mg bid and flagyl 500 mg TID   Diet: Begin  with clear fluids and progress diet as tolerated. Bowel regimen. Anti-emetics PRN.    DVT prophylaxis: Initially on  mg (postop), then Eliquis 2.5 mg BID x2 weeks after surgery (started on 11/28), NOW ON 5 mg BID PTA, initially held for anemia  Elevation: Elevate heels off of bed on pillows   Wound Care: Open to air.  Drains: 15f GUERRERO drain REMOVED on 12/6  Tran: Removed POD1  Pain management: Orals PRN, IV for breakthrough only  X-rays: AP Pelvis and lateral in PACU complete  Physical Therapy: Mobilization, ROM, ADL's  Occupational Therapy: ADL's  Labs: Trend Hgb on POD #1, 2  Consults: PT, OT. ID. Hospitalist, appreciate assistance in caring for this patient throughout the perioperative period-      Disposition: Pending placement; anticipate discharge to [facility]. Hb stable.  ID plan in place.   Follow-up: TBD -- orthopaedics will continue to follow peripherally if discharged to  TCU     Orthopedic surgery staff for this patient is Dr. Meyers.    Kieran Moore MD  Orthopaedic Surgery PGY-4  [phone]    Please page me at [phone] with any questions/concerns. If there is no response, if it is a weekend, or if it is during evening hours then please page the orthopaedic surgery resident on call.       Future Appointments   Date Time Provider Department [place]   12/24/2022 11:15 AM Oskar Trevizo PTA URPT [place]   12/26/2022  2:30 PM [facility]   12/26/2022  7:00 PM UR OT WAITLIST UROT [place]   12/28/2022  1:30 PM Ramez Cronin MD [facility]

## 2022-12-24 NOTE — LETTER
Recipient: [facility]          Sender: NEREYDA Shaffer ([phone])  Danielle NEREYDA Moreira ([phone])        Date: February 8, 2023  Patient Name:  Waldo Bustos  Patient YOB: 1950  Routing Message: See referral for [facility] or SNF placement. Please call with any questions. Thank you for considering!        The documents accompanying this notice contain confidential information belonging to the sender.  This information is intended only for the use of the individual or entity named above.  The authorized recipient of this information is prohibited from disclosing this information to any other party and is required to destroy the information after its stated need has been fulfilled, unless otherwise required by state law.    If you are not the intended recipient, you are hereby notified that any disclosure, copy, distribution or action taken in reliance on the contents of these documents is strictly prohibited.  If you have received this document in error, please return it by fax to [phone] with a note on the cover sheet explaining why you are returning it (e.g. not your patient, not your provider, etc.).  If you need further assistance, please call .  Documents may also be returned by mail to [employer], , [address], [place], [place] [address].

## 2022-12-24 NOTE — LETTER
Recipient: [facility]    Sender: ZEB Shaffer [facility] ([phone])  [email or web address]      Date: February 16, 2023  Patient Name:  Waldo Bustos  Patient YOB: 1950  Routing Message: Please see attached [facility] referral. EW/MA pending. Please call with any questions or concerns. Thank you for considering.        The documents accompanying this notice contain confidential information belonging to the sender.  This information is intended only for the use of the individual or entity named above.  The authorized recipient of this information is prohibited from disclosing this information to any other party and is required to destroy the information after its stated need has been fulfilled, unless otherwise required by state law.    If you are not the intended recipient, you are hereby notified that any disclosure, copy, distribution or action taken in reliance on the contents of these documents is strictly prohibited.  If you have received this document in error, please return it by fax to [phone] with a note on the cover sheet explaining why you are returning it (e.g. not your patient, not your provider, etc.).  If you need further assistance, please call .  Documents may also be returned by mail to [employer], , [address], [place], [place] [address].

## 2022-12-24 NOTE — LETTER
[facility]      Sender: NEREYDA Shaffer ([phone])  Danielle NEREYDA Moreira ([phone])      Date: February 8, 2023  Patient Name:  Waldo Bustos  Patient YOB: 1950  Routing Message: Please see attached referral for group home placement. Please call with any questions and thank you for considering!        The documents accompanying this notice contain confidential information belonging to the sender.  This information is intended only for the use of the individual or entity named above.  The authorized recipient of this information is prohibited from disclosing this information to any other party and is required to destroy the information after its stated need has been fulfilled, unless otherwise required by state law.    If you are not the intended recipient, you are hereby notified that any disclosure, copy, distribution or action taken in reliance on the contents of these documents is strictly prohibited.  If you have received this document in error, please return it by fax to [phone] with a note on the cover sheet explaining why you are returning it (e.g. not your patient, not your provider, etc.).  If you need further assistance, please call .  Documents may also be returned by mail to [employer], , [address], [place], [place] [address].

## 2022-12-24 NOTE — PROGRESS NOTES
[facility]  Admission History and Physical - Hospitalist Service       Date of Admission:  12/24/2022       ASSESSMENT & PLAN:     Waldo Bustos is a 72 year old male with history of morbid obesity, DM2, HLD, JAIR, PE/DVT on chronic apixaban, venous insufficiency, osteoarthritis, and chronic L hip prosthetic joint infection who was admitted to [facility] on 11/22/22 for scheduled explantation of left hip prosthesis, debridement, and reconstruction with antibiotic cement spacer by Dr. Meyers of orthopedic surgery. Medicine was consulted post-operatively for co-management. His hospital say was complicated by acute encephalopathy, acute hypoxic respiratory failure, acute blood loss anemia, HALEY, and profound physical deconditioning. Transferred to TCU for ongoing rehabilitation.       # Chronic L hip PJI s/p explantation, debridement, and reconstruction with antibiotic spacer (11/22/22) -  Per Dr. Meyers. Surgical cultures grew Finegoldia and Proteus mirabilis. ID consulted. Plan is to complete 6 weeks of antibiotics (11/22-1/3). Initially treated with vanc/zosyn but later transitioned to cipro/flagyl based on cultures. Transitioned to PO antibiotics on 12/1. Afebrile. WBC normal. CRP normalized (6.1) on 12/19 (peak 120). Pain persists but well controlled.   - NWB LLE  - Cipro 500mg PO BID (stop date 1/3/23)  - Flagyl 500mg TID (stop date 1/3/23)  - Analgesia:  Oxycodone, Robaxin, Tylenol prn  - Continue bowel regimen with miralax + senokot  - Needs ID follow up ~1 week prior to completion of abx. Would curbside ID to see if they would like formal consult at TCU vs clinic follow up  - Follow up with Dr. Meyers as directed    # Physical deconditioning -  Failed to progress with therapies in hospital due to lack of motivation. Severe deconditioned d/t immobility and recent surgery.   - PT/OT consults  - NWB LLE  - Currently transfers with lift    # Acute on chronic  normocytic anemia with acute blood loss -  Baseline Hgb ~10. Hgb down to 6.3 post-op. Received total of 4 units PRBC in hospital. Hgb stable at 9.4.   - CBC q M/Th  - Transfuse for Hgb <7.0    # DM2 -  Well controlled at baseline. A1C 6.8% on 11/23. PTA regimen includes glipizide, pioglitazone, metformin, and trulicity. Initially managed with sliding scale novlog post-op. Later restarted on metformin and pioglitazone. Sugars well controlled in hospital.   - Continue metformin 2000mg daily + pioglitazone 45mg daily  - Restart Trulicity 1.8mg daily  - Glucose checks BID  - Hypoglycemia protocol ordered     # JAIR - Continue home CPAP    # HLD -  Continue PTA lipitor    # Hx DVT/PE -  PTA apixaban held prior to surgery. Started on ASA immediately post-op, then transitioned to apixaban 2.5mg BID x 2 weeks, then restarted full therapeutic dose. Currently no acute concerns.  - Apixaban 5mg BID    # Venous insufficiency -  Chronic lower extremity edema managed with lasix. Diuretics held post-op in setting of HALEY. Restarted without issue. No significant edema on exam today.  - Lasix 20mg daily     Resolved Hospital Problems:  # Acute toxic-metabolic encephalopathy -  Likely due to anesthesia and sedating medications. Gradually improved throughout hospital course. Mentation back to baseline.   # Acute hypoxic respiratory failure -  Likely d/t obesity hypoventilation syndrome. No hypercapnia noted on VBG. Improved with IS.  # HALEY -  Likely pre-renal due to intravascular volume depletion. Improved w/ hold lasix. Cr 0.99 prior to discharge.           Diet: Regular  DVT Prophylaxis: DOAC  Code Status: Full Code    Indications for psychotropic medications:  N/A.     Pneumococcal Vaccination Status:    PCV13: 1/2017.  PPSV23: 10/227.    Based on patient's age and risk factors, vaccination status is up to date.    Disposition Plan  Expected discharge: TBD, recommended to prior living arrangement once therapies completed.  Entered:  "Lang Blakely PA-C 12/24/2022 12:31 PM        The patient's care was discussed with the Attending Physician, Dr. Mcqueen.    Lang Blakely PA-C  Internal Medicine, [facility] Hospitalist Service  [facility], [place]  Pager [phone]      ______________________________________________________________________      HISTORY OF PRESENT ILLNESS   Waldo Bustos is a 72 year old male with history of DM, RA, JAIR, PE/DVT on anticoagulation, venous insufficiency, and chronic left hip PJI who was admitted to [facility] 11/22 for left hip prosthesis explantation, debridement, and reconstruction with antibiotic cement spacer by Dr. Meyers of orthopedic surgery. His hospital stay was complicated by acute encephalopathy, hypoxia, acute blood loss, and HALEY. He was slow to progress with activity post-op resulting in further deconditioning. He is currently a lift-only transfer. He will complete a total of 6 weeks of antibiotics prior to follow up with orthopedics for possible revision hip arthroplasty. He was transferred to TCU for further rehabilitation.     Patient notes ongoing generalized weakness in setting of immobility. He does have pain related to L hip, especially with transfers. He says that he can feel his hip moving around \"unnaturally.\" He denies any other complaints or concerns at this time. He is anxious to find out what his next steps are, however pessimistic that further surgery will solve his problems.     ROS:  10 point ROS neg other than the symptoms noted above in the HPI.    Medical History:  1. Type II diabetes mellitus   2. Diabetic retinopathy  3. Morbid obesity   4. Venous insufficiency  5. Chronic LE edema  6. Hx PE/DVT (2019) on chronic apixaban  7. Dyslipidemia   8. JAIR/OHS  9. Anemia of chronic disease  10. Osteoarthritis   11. 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 GERD     Surgical History:  1. Inguinal hernia " repair  2. Rhinoplasty  3. Bilateral cataract surgery  4. Left total hip arthroplasty, with incision in 2018, revision in 2019, and explantation with debridement and antibiotic spacer 11/22/22  5. IR drain placement left hip, 10/2022  6. Colonoscopy, last 4/2022  7. EGD, 4/2022     Social History:  Non-smoker. No alcohol abuse.       Family History:  Non-contributory.     Allergies:  Sulfa    Prior to Admission Medications:  1. Tylenol prn  2. Apixaban 5mg BID  3. Lipitor 40mg daily  4. Cipro 500mg q12h   5. Lasix 20mg daily  6. Lactobacillus BID  7. Metformin XR 2000mg daily  8. Robain 500mg q6h prn  9. Metronidazole 500mg TID  10. Miconazole 2% powder BID  11. Oxycodone 5mg q4h prn  12. Pioglitazone 45mg daily  13. Miralax daily  14. Senokot BID  15. Victoza 1.8mg daily     Physical Exam:   There were no vitals taken for this visit.   General:  Obese, otherwise well developed. Appears stated age. Awake. Alert. NAD.   HEENT:  Atraumatic, normocephalic. No scleral icterus or conjunctival erythema. Mucous membranes moist.   Cardiovascular:  RRR. S1, S2. No murmur.   Respiratory:  Normal effort. CTAB. No wheezing, rhonchi or rales.  Gastrointestinal:  Abdomen soft, non-distended. Active bowel sounds. Periumbilical hernia noted, easily reduced. No tenderness, guarding, or rebound. No mass or HSM.    Neurological:  Grossly non-focal. Moves all extremities.    Extremities:  No peripheral edema. Bilateral stasis dermatitis. No calf tenderness.   Skin:  No visible rash.     Data reviewed today: I personally reviewed all medications, labs, and imaging results over the last 24 hours.     Recent Labs   Lab 12/21/22  0833 12/18/22  0708    138   POTASSIUM 4.5 4.3   CHLORIDE 108 107   CO2 23 28   ANIONGAP 6 3   BUN 28 21   CR 0.99 1.19   MAIKOL 8.6 8.4*     Recent Labs   Lab 12/21/22  0833 12/18/22  0708   WBC 9.3 9.1   RBC 3.32* 3.39*   HGB 9.4* 9.7*   HCT 30.3* 31.7*   MCV 91 94   MCH 28.3 28.6   MCHC 31.0* 30.6*   RDW  15.2* 14.8    344     No lab results found in last 7 days.  Recent Labs   Lab 12/19/22  0905   CRP 6.1   SED 86*      No lab results found in last 7 days.  No lab results found in last 7 days.  Recent Labs   Lab 12/24/22  0956 12/24/22  0755 12/24/22  0219 12/23/22  2153 12/23/22  1751 12/23/22  1241 12/23/22  0823 12/23/22  0204 12/22/22  2207 12/22/22  1706 12/22/22  1444 12/22/22  1208   * 174* 129* 176* 154* 154* 167* 129* 154* 202* 147* 179*

## 2022-12-24 NOTE — PLAN OF CARE
PT AOx4. VSS. Denies chest pain, SOB, numbness/tingling, nausea/vomiting. Pt denies pain when staying still in bed. Pain managed successfully with rest, no PRNs given. Pt has rash in skin folds and groin and buttocks.   Pt calls appropriately and is able to make needs known. No acute events or changes overnight.  @ HS, No insulin given.  @ 0200. Continue POC.

## 2022-12-24 NOTE — LETTER
Recipient:  [facility]   Fax# [phone]          Sender:  NEREYDA Sharp, [facility]    Ph-[phone]          Date: February 27, 2023  Patient Name:  Waldo Bustos  Patient YOB: 1950  Routing Message:  Please review for [facility].  Pt is in process of MA.  Pt has had [place] [facility] done for EW.  Pt is medically ready for discharge anytime.         The documents accompanying this notice contain confidential information belonging to the sender.  This information is intended only for the use of the individual or entity named above.  The authorized recipient of this information is prohibited from disclosing this information to any other party and is required to destroy the information after its stated need has been fulfilled, unless otherwise required by state law.    If you are not the intended recipient, you are hereby notified that any disclosure, copy, distribution or action taken in reliance on the contents of these documents is strictly prohibited.  If you have received this document in error, please return it by fax to [phone] with a note on the cover sheet explaining why you are returning it (e.g. not your patient, not your provider, etc.).  If you need further assistance, please call [facility] Centralized Transcription at [phone].  Documents may also be returned by mail to [employer], , [address], [place], [place] [address].

## 2022-12-24 NOTE — LETTER
Recipient: [facility]    Sender: Barrera Shaffer [facility] ([phone])  [email or web address]    Date: February 23, 2023  Patient Name:  Waldo Bustos  Patient YOB: 1950  Routing Message: Please see attached SNF referral for pt. Please call me with any questions or concerns, and thank you for considering!         The documents accompanying this notice contain confidential information belonging to the sender.  This information is intended only for the use of the individual or entity named above.  The authorized recipient of this information is prohibited from disclosing this information to any other party and is required to destroy the information after its stated need has been fulfilled, unless otherwise required by state law.    If you are not the intended recipient, you are hereby notified that any disclosure, copy, distribution or action taken in reliance on the contents of these documents is strictly prohibited.  If you have received this document in error, please return it by fax to [phone] with a note on the cover sheet explaining why you are returning it (e.g. not your patient, not your provider, etc.).  If you need further assistance, please call .  Documents may also be returned by mail to [employer], , [address], [place], [place] [address].

## 2022-12-24 NOTE — LETTER
Recipient: [facility]    Sender: [facility] ([phone])  [email or web address]    Date: February 22, 2023  Patient Name:  Waldo Bustos  Patient YOB: 1950  Routing Message: Please see attached [facility] referral. Please call with any questions, and thank you for considering!        The documents accompanying this notice contain confidential information belonging to the sender.  This information is intended only for the use of the individual or entity named above.  The authorized recipient of this information is prohibited from disclosing this information to any other party and is required to destroy the information after its stated need has been fulfilled, unless otherwise required by state law.    If you are not the intended recipient, you are hereby notified that any disclosure, copy, distribution or action taken in reliance on the contents of these documents is strictly prohibited.  If you have received this document in error, please return it by fax to [phone] with a note on the cover sheet explaining why you are returning it (e.g. not your patient, not your provider, etc.).  If you need further assistance, please call .  Documents may also be returned by mail to [employer], , [address], [place], [place] [address].

## 2022-12-25 NOTE — PROGRESS NOTES
6 week ABX course to conclude ~1/3/2023.  Dr. Meyers desires follow-up around this time prior to 2 month ABX holiday.  Schedulers messaged    Kieran Moore MD  Orthopaedic Surgery PGY-4  [phone]    Please page me at [phone] with any questions/concerns. If there is no response, if it is a weekend, or if it is during evening hours then please page the orthopaedic surgery resident on call.

## 2022-12-25 NOTE — H&P
[facility]    History and Physical - Hospitalist Service       Date of Admission:  12/24/2022    Assessment & Plan     A: Patient is a 71 y/o man who has multiple medical problems including diabetes mellitus type II, rheumatoid arthritis, obstructive sleep apnea and past DVT/PE. Patient had undergone left total hip arthroplasty for left hip osteoarthritis on 07-Mar-2018. 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 Patient apparently was treated with ceftriaxone and amoxicillin following this. Patient had a left hip aspiration in Feb-2021. 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 Patient had fluid aspiration and drain placement on 06-Apr-2022. Cultures grew Cutibacterium species.    Patient last was hospitalized from 22-Nov-2022 to 24-Nov-2022. Patient underwent explantation of left longstem total hip arthroplasty, extended proximal femoral trochanteric osteotomy left femur and manufacture and placement of vancomycin tobramycin impregnated cement spacer implant on 22-Nov-2022 for left hip prosthetic joint infection. Cultures grew Proteus mirabilis and Finegoldia magna. During hospital stay, patient also had acute encephalopathy, acute blood loss anemia, acute hypoxemic respiratory failure and acute kidney injury. Patient received 4 units of pRBCs during hospital stay for acute blood loss anemia. Acute encephalopathy, acute hypoxemic respiratory failure and acute kidney injury had resolved by the time of discharge. Patient was discharged to TCU on 24-Nov-2022.     P:  1.) Physical deconditioning: PT/OT as able. Patient is currently non-weightbearing on left lower extremity.  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 Patient to f/u with Infectious Disease and Orthopaedic Surgery as scheduled.   3.) Acute blood loss anemia - patient was transfused during hospital stay; patient has iron deficiency anemia at baseline: Monitoring labs periodically.  4.) Diabetes mellitus type II: Patient on glipizide, pioglitazone, metformin and trulicty as outpatient. Patient on victoza, metformin and pioglitazone for now. Blood glucose to be monitored and medication adjusted as necessary.  5.) Obstructive sleep apnea: Patient on home CPAP  6.) Hyperlipidemia: Patient on lipitor.  7.) History of DVT and PE: Eliquis had been held prior to surgery. Patient was on aspirin immediately post-operatively, was transitioned to eliquis 2.5 mg twice daily for 2 weeks and is now back on home dose of eliqus 5 mg twice daily.  8.) Venous insufficiency: Lasix had been held during hospital stay due to acute kidney injury but patient is now back on home dose of lasix 20 mg daily.     Diet: Regular Diet Adult    DVT Prophylaxis: Patient on eliquis 5 mg twice daily  Tran Catheter: Not present  Central Lines: None  Cardiac Monitoring: None  Code Status: Full Code      Immunizations:  - Pneumococcal 23 valent: 08-Apr-2003 and 29-Oct-2007  - Pneumo Conj 13-V: 30-Jan-2017      Clinically Significant Risk Factors Present on Admission               # Drug Induced Coagulation Defect: home medication list includes an anticoagulant medication        # DMII: A1C = 6.8 % (Ref range: 0.0 - 5.6 %) within past 3 months            Mani Mcqueen MD  Hospitalist Service  [facility]  Securely message with the Vocera Web Console (learn more here)  Text page via [employer] Paging/Directory         ______________________________________________________________________    Chief Complaint     Left hip surgery    History of Present Illness     Patient is a 73 y/o  man who has multiple medical problems including diabetes mellitus type II, rheumatoid arthritis, obstructive sleep apnea and past DVT/PE. Patient had undergone left total hip arthroplasty for left hip osteoarthritis on 07-Mar-2018. 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 Patient apparently was treated with ceftriaxone and amoxicillin following this. Patient had a left hip aspiration in Feb-2021. 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 Patient had fluid aspiration and drain placement on 06-Apr-2022. Cultures grew Cutibacterium species.    Patient last was hospitalized from 22-Nov-2022 to 24-Nov-2022. Patient underwent explantation of left longstem total hip arthroplasty, extended proximal femoral trochanteric osteotomy left femur and manufacture and placement of vancomycin tobramycin impregnated cement spacer implant on 22-Nov-2022 for left hip prosthetic joint infection. During hospital stay, patient also had acute encephalopathy, acute blood loss anemia, acute hypoxemic respiratory failure and acute kidney injury. Patient was discharged to TCU on 24-Nov-2022.    Patient currently reports no pain at rest but notes left hip pain with movement. Patient noted no other pain. Patient noted no cough, no dyspnea, no fever, no chills, no nausea and no vomiting. Patient noted loose stool but no diarrhea. Patient noted no other problems at this point in time.    Review of Systems    - 10 point review of systems unremarkable aside from what was mentioned in HPI.    Past Medical History    I have reviewed this patient's medical history and updated it with pertinent information if needed.   Past Medical History:   Diagnosis Date     Arthritis 5 years ago     Chronic osteoarthritis Not sure      Diabetes (H) 10-12 years ago     DVT (deep venous thrombosis) (H)      Dyslipidemia      Gastroesophageal reflux disease      History of blood transfusion      Iliopsoas abscess (H)      Infection of prosthetic hip joint (H)      JAIR (obstructive sleep apnea)      Pulmonary embolism (H)      RA (rheumatoid arthritis) (H) Not sure     Reduced vision 2-3 years ago    Cataract Surgery on both eyes     Venous insufficiency    - Iron deficiency anemia    Past Surgical History   I have reviewed this patient's surgical history and updated it with pertinent information if needed.  Past Surgical History:   Procedure Laterality Date     Cataract       COLONOSCOPY       EGD       IR IVC FILTER PLACEMENT      removed     IRRIGATION AND DEBRIDEMENT HIP, PLACE ANTIBIOTIC CEMENT BEADS / SPACER Left 11/22/2022    Procedure: and Reconstruction Using G 20 Prosthetic Antibiotic Cement Spacer;  Surgeon: Rom Meyers MD;  Location: [facility] OR     REMOVE HARDWARE ARTHROPLASTY HIP Left 11/22/2022    Procedure: Explantation of Chronic  Infected Left Total Hip Arthroplasty, Extended Trochanteric Osteotomy with Extensive Debridement;  Surgeon: Rom Meyers MD;  Location: [facility] OR     [facility] PELVIS/HIP JOINT SURGERY UNLISTED       [facility] STOMACH SURGERY PROCEDURE UNLISTED  1954    2 Hernia surgeries as a child       Social History   I have reviewed this patient's social history and updated it with pertinent information if needed.  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    Prior to Admission Medications   Prior to Admission Medications   Prescriptions Last Dose Informant Patient Reported? Taking?   BD CEM U/F 32G X 4 MM insulin pen needle   Yes No   Patient not taking:  Reported on 2022   COMPRESSION STOCKINGS   Yes No   Sig: For personal use. Length:calf FARROW WRAPS BILATERALLY   Misc. Devices (WALKER AUTO GLIDES) MISC   Yes No   Si Wheeled Walker for home use. For 6 weeks.   Patient not taking: Reported on 2022   VICTOZA PEN 18 MG/3ML soln   Yes No   Sig: Inject 1.8 mg Subcutaneous daily   acetaminophen (TYLENOL) 325 MG tablet   No No   Sig: Take 2 tablets (650 mg) by mouth every 4 hours as needed for other (For optimal non-opioid multimodal pain management to improve pain control.)   apixaban ANTICOAGULANT (ELIQUIS) 2.5 MG tablet   No No   Sig: Take 1 tablet (2.5 mg) by mouth 2 times daily   apixaban ANTICOAGULANT (ELIQUIS) 5 MG tablet   No No   Sig: Take 1 tablet (5 mg) by mouth 2 times daily   atorvastatin (LIPITOR) 40 MG tablet   Yes No   Sig: Take 40 mg by mouth daily   blood glucose (ACCU-CHEK DEDRICK PLUS) test strip   Yes No   Sig: TEST BLOOD SUGARS 3 TIMES A DAY. E11.622 SKIN ULCER, HIGH A1C   blood glucose monitoring (NO BRAND SPECIFIED) meter device kit   Yes No   ciprofloxacin (CIPRO) 500 MG tablet   No No   Sig: Take 1 tablet (500 mg) by mouth every 12 hours   furosemide (LASIX) 20 MG tablet   Yes No   Sig: Take 20 mg by mouth daily   glipiZIDE (GLUCOTROL XL) 10 MG 24 hr tablet   Yes No   Sig: Take 10 mg by mouth daily   lactobacillus rhamnosus, GG, (CULTURELL) capsule   No No   Sig: Take 1 capsule by mouth 2 times daily   metFORMIN (GLUCOPHAGE XR) 500 MG 24 hr tablet   Yes No   Sig: Take 2,000 mg by mouth every morning   methocarbamol (ROBAXIN) 500 MG tablet   No No   Sig: Take 1 tablet (500 mg) by mouth every 6 hours as needed for muscle spasms   metroNIDAZOLE (FLAGYL) 500 MG tablet   No No   Sig: Take 1 tablet (500 mg) by mouth 3 times daily   miconazole (MICATIN) 2 % external powder   No No   Sig: Apply topically 2 times daily   oxyCODONE (ROXICODONE) 5 MG tablet   No No   Sig: Take 1 tablet (5 mg) by mouth every 4 hours as needed for moderate pain  (4-6)   pioglitazone (ACTOS) 45 MG tablet   Yes No   Sig: Take 45 mg by mouth daily   polyethylene glycol (MIRALAX) 17 g packet   No No   Sig: Take 17 g by mouth daily   senna-docusate (SENOKOT-S/PERICOLACE) 8.6-50 MG tablet   No No   Sig: Take 2 tablets by mouth 2 times daily      Facility-Administered Medications: None     Allergies   Allergies   Allergen Reactions     Sulfa Drugs      Other reaction(s): *Unknown - Childhood Rxn     Tizanidine Other (See Comments)     Frequent urination; causes drowsiness, and dry mouth         Physical Exam   Vital Signs: Temp: 97.2  F (36.2  C) Temp src: Oral BP: (!) 141/70 Pulse: 74   Resp: 16 SpO2: 94 % O2 Device: None (Room air)      General: Patient comfortable, NAD.  Eyes: No scleral icterus or conjunctival injection.  Heart: RRR, S1 S2 w/o murmurs.  Lungs: Breath sounds present. No crackles/wheezes heard.  Gastrointestinal: Abdomen soft, nontender.  Skin: Warm, dry.  Musculoskeletal: Good muscle tone.  Neuro: Cranial nerves II-XII grossly intact.  Psych: Affect blunted.

## 2022-12-25 NOTE — PLAN OF CARE
Goal Outcome Evaluation:    Patient is alert and oriented x 4. He is able to make his needs known. Pt denied SOB and chest pain. He is able to swallow without difficulty but prefers to take meds with apple sauce. He refused scheduled tuberculin medication.       Patient's most recent vital signs are:     Vital signs:  BP: 122/54  Temp: 96.8  HR: 65  RR: 16  SpO2: 93 %     Patient does not have new respiratory symptoms.  Patient does not have new sore throat.  Patient does not have a fever greater than 99.5.

## 2022-12-25 NOTE — PLAN OF CARE
Goal Outcome Evaluation:     Plan of Care Reviewed With: patient    Overall Patient Progress: no change    New admit yesterday. Pt is alert and oriented. Denies any pain or discomfort at rest so far this shift. Able to use urinal in bed but was also incontinent once. L hip incision with steri-strips in place, tender and  slightly swollen. Used bed pan for loose BM x 1. Slow turning at pt's own pace. Has some questions re: his medications that he feels is much lesser than what he was taking prior to transfer. Would like to discuss in AM. Has scheduled Cipro at 0230 should have received at 2100 but rescheduling to be given 2H apart from Lactobacillus but at the same time pt would like minimal/ no sleep interruption. Given at midnight instead then reschedule for 10am and 10 pm ( Lactobacillus 8 am and 8 pm). Pt has no c/o SOB and no s/s of respiratory issue noted at RA. Appear to be sleeping/resting between cares/ meds. Continue with plan of care.    Patient's most recent vital signs are:      Vital signs:  BP: 122/54  Temp: 96.8  HR: 65  RR: 16  SpO2: 93 %     Patient does not have new respiratory symptoms.  Patient does not have new sore throat.  Patient does not have a fever greater than 99.5.

## 2022-12-25 NOTE — PROGRESS NOTES
"Social Work: Initial Assessment with Discharge Plan    Patient Name: Waldo Bustos  : 1950  Age: 72 year old  MRN: [id number]  Completed assessment with: chart review and patient interview  Admitted to TCU: 22    Presenting Information   Date of SW assessment: 2022  Health Care Directive: Provided Copy and Provided education  Primary Health Care Agent: patient  Secondary Health Care Agent: Adoptive brother Gerry \"Lalo\" Akash is NOK, per patient  Living Situation: resides with significant other, Claire Das in a multi-level home  Previous Functional Status: independently ambulated with cane PTA, also drove and tended to ADL's independently  DME available: cane  Patient and family understanding of hospitalization: appropriate and pleasant  Cultural/Language/Spiritual Considerations: pt is a 73 yo male who is in a relationship with significant other   Abuse concerns: none  -------------------------------------------------------------------------------------------------------------  TRANSPORTATION:    Has lack of transportation kept you from medical appointments, meetings, work, or from getting things needed for daily living?  A. Yes, it has kept me from medical appointments or from getting my medications  B. Yes, it has kept me from non-medical meetings, appointments, work or from getting things that I need  C. No  X. Patient Unable to respond  Y. Patient declines to respond  -------------------------------------------------------------------------------------------------------------  Health Literacy:   How Often do you need to have someone help you when you read instructions, pamphlets, or other written material from your doctor or pharmacy?  0.       Never  1.       Rarely  2.       Sometimes  3.       Often  4.       Always  5.       Patient declines to respond  6.       Patient unable to " "respond  ------------------------------------------------------------------------------------------------------------   BIMS:  See flow sheet   Tell the pt : \"I am going to say three words for you to remember.  Please repeat the words after I have said all three.  The words are:Sock, Blue and Bed. Now tell me the three words.\"     Number of words repeated after the first attempt.  0: None   1: One  2: Two  3: Three  Ask the pt: \"Please tell me what year it is right now?\"  0: Missed by 5 >5 years or no answer   1: Missed by 2-5 years  2: Missed by a 1 year  3: Correct      Ask the pt: \"What month are we in right now?\"  0: Missed by > 1 month or no answer.  1: Missed by 6 days to 1 month  2: Accurate within 5 days.     Ask pt: \"what day of the week is today?\"  0: Incorrect day of the week.  1: Correct.     Ask pt:\" Let's go back to an earlier question.  What were those three words that I asked you to repeat?\" If unable to remember a word, give a cue (something to wear, a color, a piece of furniture) for that word.   Able to recall Sock.  0: No, could not recall.  1: Yes, after cueing (something to wear)  2: Yes, no cueing required.   Able to recall Blue.  0: No, could not recall.  1:Yes, after cueing (a color)  2:Yes, no cueing required.   Able to recall Bed.  0: No,  1: Yes, after cueing (a piece of furniture)  2: Yes, no cueing required.   -----------------------------------------------------------------------------------------------------------  CAM:  1.) Acute Onset/Fluctuating Course:  Is there evidence of an acute change in mental status from the patient's baseline?  0. No  1. Yes  2.) Inattention:  Does the patient have difficulty focusing attention, for example, being easily distractable, or having difficulty keeping track of what was being said?  0. No - Behavior not present  1. Yes - Behavior present  3.) Disorganized Thinking:  Is the patient's speech disorganized or incoherent, such as rambling or " irrelevant conversation, unclear or illogical flow of ideas, or unpredictable switching from subject to subject?  0. No - Behavior not present  1. Yes - Behavior present  4.) Altered level of consciousness:  Did the patient have altered level of consciousness as indicated by any of the following criteria?  0. No - Alert  1. Yes - Vigilant (hyperalert), lethargic (drowsy) , stupor (difficult to arouse) or comatose (unable to be aroused)  -------------------------------------------------------------------------------------------------------------  PHQ-9:   Over the last 2 weeks, have you been bothered by any of the following problems?     If symptom is present, then ask the patient:  About how often have you been bothered by this?   Symptom Presence                                              Symptom Frequency  0. No                                                                     0. Never or 1 day  1. Yes                                                                   1. 2-6 days (several days)  9. No Response                                                    2. 7-11 days (half or more of the days)                                                                                3. 12-14 days (nearly every day)       Present Frequency   A.       Little interest of pleasure doing things  0     B.       Feeling down, depressed, or hopeless  0     C.       Trouble falling or staying asleep, or sleeping too much       D.       Feeling tired or having little energy       E.       Poor appetite or overeating       F.        Feeling bad about yourself - or that you are a failure, or have let yourself or your family down       G.      Trouble concentrating on things, such as reading the newspaper or watching television       H.      Moving or speaking so slowly that other people could have noticed. Or the opposite - being so fidgety or restless that you have been moving around a lot more than usual       I.          "Thoughts that you would be better off dead, or of hurting yourself in some way         -------------------------------------------------------------------------------------------------------------  SOCIAL ISOLATION  How often do you feel lonely or isolated form those around you?  0.       Never  1.       Rarely  2.       Sometimes  3.       Often  4.       Always  5.       Patient declines to respond  6.       Patient unable to respond  -------------------------------------------------------------------------------------------------------------    BIMS: Pt scored 15 on BIMS indicating cognition intact  PHQ-9: Pt scored 00 on PHQ-9 indicating normative  PAS: confirmation number- [id number]  Has there been a level II screen?  No  Were there any recommendations in the screen? N/A  If yes, will the recommendations we incorporated into the Plan of Care?  N/A  Physical Health  Reason for admission:   Per H&P,  \"Patient is a 73 y/o man who has multiple medical problems including diabetes mellitus type II, rheumatoid arthritis, obstructive sleep apnea and past DVT/PE. Patient had undergone left total hip arthroplasty for left hip osteoarthritis on 07-Mar-2018. 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 Patient apparently was treated with ceftriaxone and amoxicillin following this. Patient had a left hip aspiration in Feb-2021. 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 Patient had fluid aspiration and drain placement on 06-Apr-2022. Cultures grew Cutibacterium species. Patient last was hospitalized from 22-Nov-2022 to 24-Nov-2022. Patient underwent explantation of left longstem total hip arthroplasty, extended proximal femoral trochanteric osteotomy left femur and manufacture and placement of " "vancomycin tobramycin impregnated cement spacer implant on 22-Nov-2022 for left hip prosthetic joint infection. Cultures grew Proteus mirabilis and Finegoldia magna. During hospital stay, patient also had acute encephalopathy, acute blood loss anemia, acute hypoxemic respiratory failure and acute kidney injury. Patient received 4 units of pRBCs during hospital stay for acute blood loss anemia. Acute encephalopathy, acute hypoxemic respiratory failure and acute kidney injury had resolved by the time of discharge. Patient was discharged to TCU on 24-Nov-2022.\"       Provider Information   Primary Care Physician:Jv Felix   : none reported    Mental Health:   Diagnosis: none  Current Support/Services: none  Previous Services: none  Services Needed/Recommended: none    Substance Use:  Diagnosis: none  Current Support/Services: none  Previous Services: none  Services Needed/Recommended: none    Support System  Marital Status: single   Family support: significant other, Claire Das  Other support available: adoptive brotherGerry \"Lalo\" [facility] [phone]  Gaps in support system: none noted    Community Resources  Current in home services: none  Previous services: none    Financial/Employment/Education  Employment Status: retired  Income Source: correction  Education: not assessed  Financial Concerns:  Insurance coverage and cost of TCU stay  Insurance: [place] [employer] MEDICARE ADVANTAGE    Discharge Plan   Patient and family discharge goal: return home when therapy goals are reached and patient can navigate house with stairs  Provided Education on discharge plan: YES  Patient agreeable to discharge plan:  YES  A list of Medicare Certified Facilities was provided to the patient and/or family to encourage patient choice. Based on location and rating, patient would like referrals made to: NA  General information regarding anticipated insurance coverage and possible out of pocket " cost was discussed. Patient and patient's family are aware patient may incur the cost of transportation to the facility, pending insurance payment: YES  Barriers to discharge: medical stability and therapy goals reached    Discharge Recommendations   Disposition: to be determined  Transportation Needs: TBD  Name of Transportation Company and Phone: none    Additional comments   Swrk will remain available and writer provided patient with primary unit  contact information.    Ghada Morales [facility], Transitional Care Unit   Social Work

## 2022-12-25 NOTE — PLAN OF CARE
"Goal Outcome Evaluation:    VS: BP (!) 141/70 (BP Location: Right arm)   Pulse 74   Temp 97.2  F (36.2  C) (Oral)   Resp 16   SpO2 94%    O2: RA   Output: Urinal at bedside, good output   Last BM: 12/25 bed pan  Fracture pan ordered from [facility]   Activity: In bed, watching tv  NWB LLE   Skin: X; abdominal folds, buttocks, groin - moist   Pain: Oxy and Tylenol x1   CMS Neuro: Left total hip arthroplasty w/ cement ABX spacer installed  A&O x3, makes needs known. Questions asked and answered to best abiltity   Dressing:    Diet: Reg     LDA:    Equipment: Dennis bed, [facility]   Plan: Con't POC.   Additional Info: Pt's \"My Pillow\" did not get sent over w/personal belongings.       Patient's most recent vital signs are:     Vital signs:  BP: 141/70  Temp: 97.2  HR: 74  RR: 16  SpO2: 94 %     Patient does not have new respiratory symptoms.  Patient does not have new sore throat.  Patient does not have a fever greater than 99.5.    "

## 2022-12-25 NOTE — H&P
H&P by Mani Mcqueen MD was reviewed.    Liliya Lai PA-C  Hospitalist Service  Pager [phone]

## 2022-12-26 NOTE — PROGRESS NOTES
"   12/26/22 1030   Appointment Info   Signing Clinician's Name / Credentials (PT) Bear Nguyen, PT   Living Environment   People in Home significant other  (Claire)   Current Living Arrangements house   Home Accessibility stairs to enter home;stairs within home   Number of Stairs, Main Entrance 2   Number of Stairs, Within Home, Primary other (see comments)  (1 step to the main level, then 6-7 up (bedroom, shower tub combo) /down (family room, bathroom with walk-in shower and elevated toilet), a full flight down from the lower \"family room\" level to the basement (laundry))   Transportation Anticipated family or friend will provide   Living Environment Comments multilevel house, SO can provide supervision assistance only it seems.   Self-Care   Usual Activity Tolerance fair   Current Activity Tolerance poor   Regular Exercise No   Equipment Currently Used at Home cane, straight   Fall history within last six months no   Number of times patient has fallen within last six months 0   Post-Acute Assessment Only   Post-Acute Functional Assessment See below   Previous Level of Function/Home Environm   Bathing/Grooming, Premorbid Functional Level independent   Dressing, Premorbid Functional Level independent   Eating/Feeding, Premorbid Functional Level independent   Toileting, Premorbid Functional Level independent   Bed Mobility, Premorbid Functional Level independent   Transfers, Premorbid Functional Level uses device or equipment  (cane)   Household Ambulation, Premorbid Functional Level uses device or equipment  (cane)   Stairs, Premorbid Functional Level uses device or equipment  (cane)   Community Ambulation, Premorbid Functional Level uses device or equipment  (cane)   General Information   Onset of Illness/Injury or Date of Surgery 11/22/22   Referring Physician Mani Mcqueen MD   Patient/Family Therapy Goals Statement (PT) \"to get onto a commode\"   Pertinent History of Current Problem (include personal factors " "and/or comorbidities that impact the POC) PerH&P written on 12/24/22, \"Patient is a 71 y/o man who has multiple medical problems including diabetes mellitus type II, rheumatoid arthritis, obstructive sleep apnea and past DVT/PE. Patient had undergone left total hip arthroplasty for left hip osteoarthritis on 07-Mar-2018. 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 Patient apparently was treated with ceftriaxone and amoxicillin following this. Patient had a left hip aspiration in Feb-2021. 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 Patient had fluid aspiration and drain placement on 06-Apr-2022. Cultures grew Cutibacterium species.     Patient last was hospitalized from 22-Nov-2022 to 24-Nov-2022. Patient underwent explantation of left longstem total hip arthroplasty, extended proximal femoral trochanteric osteotomy left femur and manufacture and placement of vancomycin tobramycin impregnated cement spacer implant on 22-Nov-2022 for left hip prosthetic joint infection. Cultures grew Proteus mirabilis and Finegoldia magna. During hospital stay, patient also had acute encephalopathy, acute blood loss anemia, acute hypoxemic respiratory failure and acute kidney injury. Patient received 4 units of pRBCs during hospital stay for acute blood loss anemia. Acute encephalopathy, acute hypoxemic respiratory failure and acute kidney injury had resolved by the time of discharge. Patient was discharged to TCU on 24-Nov-2022.\"   Weight-Bearing Status - LUE full weight-bearing   Weight-Bearing Status - RUE full weight-bearing   Weight-Bearing Status - LLE nonweight-bearing   Weight-Bearing Status - RLE full weight-bearing   Heart Disease Risk Factors Diabetes;Dislipidemia;Lack of physical " activity;Overweight;Stress   General Observations Pt supine in bed, incontinent of bowel and bladder.   Cognition   Cognitive Status Comments Alert and oriented x4.  Communicates needs appropriately   Pain Assessment   Patient Currently in Pain Yes, see Vital Sign flowsheet   Integumentary/Edema   Integumentary/Edema Comments Not addressed   Posture    Posture Comments remained supine this session   Range of Motion (ROM)   ROM Comment R LE WFL.  PROM to L LE only with very minimal movement due to pain   Strength (Manual Muscle Testing)   Strength Comments R LE WFL in supine.  R LE not assistance due to NWB status   Sensory Examination   Sensory Perception Comments Pt c/o numbness/tingling in R fingers for the past x3 weeks   Coordination   Coordination Comments not tested   Muscle Tone   Muscle Tone Comments not tested   Clinical Impression   Criteria for Skilled Therapeutic Intervention Yes, treatment indicated   PT Diagnosis (PT) s/p prosthetic joint infection, s/o L BELLA antibiotic spacer   Influenced by the following impairments decreased strength, decreased ROM, impaired balance,   Functional limitations due to impairments impaired bed mobility, transfers, ambulation, stairs, balance, activity tolerance,   Clinical Presentation (PT Evaluation Complexity) Evolving/Changing   Clinical Presentation Rationale NWB L LE, obesity, medical complexity, prolonged hospitalization   Clinical Decision Making (Complexity) moderate complexity   Planned Therapy Interventions (PT) balance training;bed mobility training;gait training;home exercise program;manual therapy techniques;motor coordination training;neuromuscular re-education;orthotic fitting/training;patient/family education;postural re-education;ROM (range of motion);stair training;strengthening;stretching;wheelchair management/propulsion training;progressive activity/exercise;risk factor education;home program guidelines   Anticipated Equipment Needs at Discharge (PT)  "other (see comments)  (pt has a walker, wheelchair and SEC at home.)   Risk & Benefits of therapy have been explained evaluation/treatment results reviewed;care plan/treatment goals reviewed;risks/benefits reviewed;current/potential barriers reviewed;participants voiced agreement with care plan;participants included;patient   Clinical Impression Comments 72 y.o. male s/p BELLA ABX spacer placement on 11/22/22.  Prior to admission, pt was independent with basic mobility and ADLs using a single end cane.  Currently, pt is very painful and can partially roll to the L with min A and use of bed rail.  Pt states that he cannot fully roll over to his L side, declined attempting to roll to his R side due to pain, and declined sitting up at the EOB due to pain and feeling his L hip \"click around in there\" with any movement.  Pt would benefit from continued skilled PT intervention to maximize mobility and independence with mobility and ADLs.   PT Total Evaluation Time   PT Eval, Moderate Complexity Minutes (08031) 60   Physical Therapy Goals   PT Frequency 6x/week   PT Predicted Duration/Target Date for Goal Attainment 01/24/23   PT Goals Bed Mobility;Transfers;Gait;Stairs;PT Goal 1   PT: Bed Mobility Modified independent   PT: Transfers Modified independent   PT: Gait Modified independent  (50 feet with RW)   PT: Stairs Supervision/stand-by assist;6 stairs  (with SEC and hand rail)   PT: Goal 1 Pt will demonstrate getting in/out of a car using a RW for support with SBA   PT Discharge Planning   PT Plan Needs to be seen by PT to continue assessment.   PT Discharge Recommendation (DC Rec) home with home care physical therapy;home with assist  (vs LTCF pending progress with therapy)   PT Rationale for DC Rec PT:  pt is currently only partially rolling to his L side.  Despite education on rolling with assistance from staff, pt requested to wait until a ceiling lift arrived to help get him to the EOB.  Pain is also not currently " controlled with activity, per pt report.   Total Session Time   Total Session Time (sum of timed and untimed services) 60   Post Acute Settings Only   What unit is patient on? TCU   PT - Acute Rehab Center Time   Individual Time (minutes) - enter zero if not applicable - PT 60   Group Time (minutes) - enter zero if not applicable  - PT 0   Concurrent Time (minutes) - enter zero if not applicable  - PT 0   Co-Treatment Time (minutes) - enter zero if not applicable  - PT 0   ARC Total Session Time (minutes) - PT 60   Bed Mobility: Turning side to side/Roll Left and Right   Patient Performance Total dependence (helper does ALL of the effort)   Staff Performance Two +person assist (two plus persons physical assist)   Describe Performance min A to partially roll to R side   Bed Mobility: Sit to lying   Reason Not Done Resident refused to perform   Bed Mobility: Lying to sitting on the side of bed   Reason Not Done Resident refused to perform   Transfers: Sit to Stand   Reason Not Done Resident refused to perform   Transfers: Chair/Bed transfers   Reason Not Done Resident refused to perform   Ambulation   Walks in room: Reason Not Done Safety concerns   Walk 10 Feet   Reason Not Done Safety concerns   Walk 10 Feet on uneven surfaces   Reason Not Done Safety concerns   Walk 50 Feet with Two Turns   Reason Not Done Safety concerns   Walk 150 Feet   Reason Not Done Safety concerns   Wheel 50 Feet   Reason Not Done Activity not applicable   Wheel 150 Feet   Reason Not Done Activity not applicable   1 Step (curb)   Reason Not Done Safety concerns   4 Steps   Reason Not Done Safety concerns   12 Steps   Reason Not Done Activity not applicable   Car Transfer   Reason Not Done Safety concerns   Picking up Object   Reason Not Done Safety concerns

## 2022-12-26 NOTE — PLAN OF CARE
Goal Outcome Evaluation:     Plan of Care Reviewed With: patient    Overall Patient Progress: no change    Pt has no c/o pain or discomfort this shift  Using urinal in bed, staff empties. Pt has no c/o SOB and no s/s of respiratory issue noted at RA. Appear to be sleeping/resting, snoring during rounds. Continue with plan of care.    Patient's most recent vital signs are:     Vital signs:  BP: 141/70  Temp: 97.2  HR: 74  RR: 16  SpO2: 94 %     Patient does not have new respiratory symptoms.  Patient does not have new sore throat.  Patient does not have a fever greater than 99.5.

## 2022-12-26 NOTE — PLAN OF CARE
Patient is alert and oriented x 4. He is able to make his needs known. He is able to swallow without difficulty but prefers to take his flagyl with apple sauce d/t the taste. Pt did not get oob today, he is NWB LLE. Requested estephania lift sheet to be used to clean himself as it is too difficult to roll from side to side. Pt used urinal for bladder elimination.       Patient's most recent vital signs are:     Vital signs:  BP: 135/68  Temp: 96.7  HR: 89  RR: 18  SpO2: 93 %     Patient does not have new respiratory symptoms.  Patient does not have new sore throat.  Patient does not have a fever greater than 99.5.

## 2022-12-26 NOTE — PHARMACY-ADMISSION MEDICATION HISTORY
Please see Admission Medication History completed on 11/9/22 during pre-operative assessment for information regarding prior to admission medications.    Julio Howard, PharmD

## 2022-12-26 NOTE — PROGRESS NOTES
12/26/22 0921   Appointment Info   Signing Clinician's Name / Credentials (OT) Bear Flores OTRL   Living Environment   People in Home significant other   Current Living Arrangements house   Home Accessibility stairs to enter home;stairs within home   Number of Stairs, Main Entrance 3   Number of Stairs, Within Home, Primary seven   Transportation Anticipated family or friend will provide   Living Environment Comments no bathroom or bedroom on main. pt has shower chair, commode, hip kit and walker at home. 3rd hip surgery.   Self-Care   Usual Activity Tolerance fair   Current Activity Tolerance poor   Equipment Currently Used at Home cane, straight   Instrumental Activities of Daily Living (IADL)   Previous Responsibilities meal prep;housekeeping;laundry;shopping   General Information   Referring Physician Mani Mcqueen MD   Additional Occupational Profile Info/Pertinent History of Current Problem per chart, 71 y/o man who has multiple medical problems including diabetes mellitus type II, rheumatoid arthritis, obstructive sleep apnea and past DVT/PE. Patient had undergone left total hip arthroplasty for left hip osteoarthritis on 07-Mar-2018. 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 Patient apparently was treated with ceftriaxone and amoxicillin following this. Patient had a left hip aspiration in Feb-2021. 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 Patient had fluid aspiration and drain placement on 06-Apr-2022. Cultures grew Cutibacterium species   Existing Precautions/Restrictions   (NWB and total hip LLE, spacer in place)   Left Upper Extremity (Weight-bearing Status) full weight-bearing (FWB)   Right Upper Extremity (Weight-bearing Status) full weight-bearing (FWB)   Left Lower  Extremity (Weight-bearing Status) non weight-bearing (NWB)   Right Lower Extremity (Weight-bearing Status) weight-bearing as tolerated (WBAT)   Cognitive Status Examination   Orientation Status orientation to person, place and time   Affect/Mental Status (Cognitive) WFL   Visual Perception   Visual Impairment/Limitations WNL   Strength Comprehensive (MMT)   General Manual Muscle Testing (MMT) Assessment no strength deficits identified  (UB)   Coordination   Upper Extremity Coordination No deficits were identified   Activities of Daily Living   BADL Assessment/Intervention bathing;upper body dressing;lower body dressing;grooming;feeding;toileting   Clinical Impression   Criteria for Skilled Therapeutic Interventions Met (OT) Yes, treatment indicated   OT Diagnosis declined level of I and safety in I/ADLs   Influenced by the following impairments post surgical restrictions, (NWB LLE), pain, imbalance   OT Problem List-Impairments impacting ADL cognition;balance;problems related to;activity tolerance impaired;flexibility;mobility;motor control;range of motion (ROM);strength   Assessment of Occupational Performance 5 or more Performance Deficits   Identified Performance Deficits declined level of I in ADLs   Planned Therapy Interventions (OT) ADL retraining;IADL retraining;balance training;groups;manual therapy;motor coordination training;ROM;strengthening;transfer training;home program guidelines;progressive activity/exercise;risk factor education   Clinical Decision Making Complexity (OT) moderate complexity   Anticipated Equipment Needs Upon Discharge (OT) hospital bed;wheelchair cushion;wheelchair   Risk & Benefits of therapy have been explained care plan/treatment goals reviewed;evaluation/treatment results reviewed;risks/benefits reviewed;current/potential barriers reviewed;participants voiced agreement with care plan;participants included;patient   Clinical Impression Comments OT: pt presents with significantly  declined level of I and safety in I/ADLs due to NWB status of LLE and pain. pt very sleepy and cautious to attempt to get OOB with OT during evaluation. pt wants to wait for PT. Pt eddie  benefit from skilled OT service to reach max potential level of I in I/ADLs while adhering to WB status.   OT Total Evaluation Time   OT Eval, Moderate Complexity Minutes (99291) 15   OT Goals   Therapy Frequency (OT) 5 times/wk   OT Predicted Duration/Target Date for Goal Attainment 02/07/23   OT Goals Upper Body Dressing;Hygiene/Grooming;Lower Body Dressing;Upper Body Bathing;Lower Body Bathing;Bed Mobility;Toilet Transfer/Toileting;Meal Preparation;Home Management;Transfers   OT: Hygiene/Grooming modified independent   OT: Upper Body Dressing Modified independent;from wheelchair   OT: Lower Body Dressing Moderate assist;within precautions;Minimal assist;using adaptive equipment   OT: Upper Body Bathing Supervision/stand-by assist   OT: Lower Body Bathing Moderate assist;with precautions;using adaptive equipment   OT: Bed Mobility Modified independent;within precautions;supine to/from sitting   OT: Transfer Modified independent;within precautions;with assistive device   OT: Toilet Transfer/Toileting Modified independent;using adaptive equipment;within precautions;cleaning and garment management   OT: Meal Preparation with simple meal preparation;from wheelchair   OT: Home Management Minimal assist;from wheelchair;with light demand household tasks   Self-Care/Home Management   Self-Care/Home Mgmt/ADL, Compensatory, Meal Prep Minutes (20401) 30   Treatment Detail/Skilled Intervention OT: Encouraged FB sponge bathing and dressing in bed. pt declines. pt performng g/h in bed. explained benefits and purpose. discussed goals and plan for OT POC regarding ADLs. pt hesitant at first but verbalized understanding. pt states too fatigued for further participation this date.   OT Discharge Planning   OT Plan OT: sponge bathing in bed, UB  "strengthening, discuss plan for progressing with toileting to use commode.   OT Discharge Recommendation (DC Rec) home with home care occupational therapy;Per plan established by the OT;Collaborated with OT for updated discharge location   OT Brief overview of current status total A using lift for transfers   Total Session Time   Timed Code Treatment Minutes 30   Total Session Time (sum of timed and untimed services) 45   Hygiene/Grooming   Patient Performance Partial/moderate assist \"includes weight bearing assist of trunk or limbs\"   Oral Hygiene   Patient Performance Independent   Staff Performance Set up help only     -15 fatigue  "

## 2022-12-26 NOTE — PLAN OF CARE
Goal Outcome Evaluation:    Patient is alert and oriented x 4. He is able to make his needs known. Pt denied SOB and chest pain. He is able to swallow without difficulty but prefers to take meds with apple sauce. He experienced nausea and vomited x 1. He verbalized that he ate too much sugar foods for dinner that was provided by his family. Withheld his 2100 meds for 45 minutes. Pt verbalized feeling better after vomiting. Pt took all his meds as ordered.    Patient's most recent vital signs are:     Vital signs:  BP: 141/70  Temp: 97.2  HR: 74  RR: 16  SpO2: 94 %     Patient does not have new respiratory symptoms.  Patient does not have new sore throat.  Patient does not have a fever greater than 99.5.

## 2022-12-26 NOTE — PLAN OF CARE
Discharge Planner Post-Acute Rehab OT:     Discharge Plan: goal is home with increased service, home OT/PT, To be determined    RECERT: 2/6/23  Precautions: NWB LLE, s/p L hip surgery due to infection with Spacer in place     Current Status:  ADLs:    Mobility: NT, lift Ax2     Grooming: set up and SBA    Dressing: TBA    Bathing: TBA    Toileting: total A using bed pan  IADLs: NT, pt mod I at W  Vision/Cognition: WNL    Assessment: Pt presents with significantly declined level of I in I/ADLs due to surgical precautions and pain in L hip. This is pt's 3rd surgery in the L hip. Pt declined attempting EOB sitting with OT at this time. Wants to attempt transfer with PT first.     Other Barriers to Discharge (DME, Family Training, etc): multilevel house with significant other that is available to help but 'very little' per pt. Pt has walker, commode, shower chair and hip kit at home from previous hip surgery    -15 minutes due to pt fatigue

## 2022-12-26 NOTE — PHARMACY-MEDICATION REGIMEN REVIEW
Pharmacy Medication Regimen Review  Waldo Bustos is a 72 year old male who is currently in the Transitional Care Unit.    Assessment: Upon review of the medications and patient chart the following irregularities were found:     Antimicrobials without defined duration:   #Ciprofloxacin/Metronidazole: Patient currently on these for treatment of left hip prosthetic joint infection. Duration of treatment TBD pending ID and orthopedic follow-up scheduled for this week.      Plan:   1. Continue current medications as ordered  2. Follow-up ID and orthopedic recommendations regarding duration of treatment for left PJI. Tentative stop date 1/3/2023    Attending provider will be sent this note for review.  If there are any emergent issues noted above, pharmacist will contact provider directly by phone.      Pharmacy will periodically review the resident's medication regimen for any PRN medications not administered in > 72 hours and discontinue them. The pharmacist will discuss gradual dose reductions of psychopharmacologic medications with interdisciplinary team on a regular basis.    Please contact pharmacy if the above does not answer specific medication questions/concerns.    Background:  A pharmacist has reviewed all medications and pertinent medical history today.  Medications were reviewed for appropriate use and any irregularities found are listed with recommendations.      Current Facility-Administered Medications:      - Skin Test Reading -, , Does not apply, Q21 Days, Lang Blakely PA-C     acetaminophen (TYLENOL) tablet 650 mg, 650 mg, Oral, Q4H PRN, Lang Blakely PA-C, 650 mg at 12/25/22 0952     apixaban ANTICOAGULANT (ELIQUIS) tablet 5 mg, 5 mg, Oral, BID, Lang Blakely PA-C, 5 mg at 12/26/22 1005     atorvastatin (LIPITOR) tablet 40 mg, 40 mg, Oral, Daily, Mani Mcqueen MD, 40 mg at 12/26/22 1005     ciprofloxacin (CIPRO) tablet 500 mg, 500 mg, Oral, Q12H, Lang Blakely PA-C,  500 mg at 12/26/22 1013     glucose gel 15-30 g, 15-30 g, Oral, Q15 Min PRN **OR** dextrose 50 % injection 25-50 mL, 25-50 mL, Intravenous, Q15 Min PRN **OR** glucagon injection 1 mg, 1 mg, Subcutaneous, Q15 Min PRN, Lang Blakely PA-C     furosemide (LASIX) tablet 20 mg, 20 mg, Oral, Daily, Lang Blakely PA-C, 20 mg at 12/26/22 1005     lactobacillus rhamnosus (GG) (CULTURELL) capsule 1 capsule, 1 capsule, Oral, BID, Lang Blakely PA-C, 1 capsule at 12/26/22 1159     liraglutide (VICTOZA) injection 1.8 mg, 1.8 mg, Subcutaneous, Daily, Lang Blakely PA-C, 1.8 mg at 12/26/22 1006     metFORMIN (GLUCOPHAGE XR) 24 hr tablet 2,000 mg, 2,000 mg, Oral, QAM, Lang Blakely PA-C, 2,000 mg at 12/26/22 1004     methocarbamol (ROBAXIN) tablet 500 mg, 500 mg, Oral, Q6H PRN, Lang Blakely PA-C     metroNIDAZOLE (FLAGYL) tablet 500 mg, 500 mg, Oral, TID, Lang Blakely PA-C, 500 mg at 12/26/22 1005     miconazole (MICATIN) 2 % powder, , Topical, BID, aLng Blakely PA-C, Given at 12/26/22 1006     naloxone (NARCAN) injection 0.2 mg, 0.2 mg, Intravenous, Q2 Min PRN **OR** naloxone (NARCAN) injection 0.4 mg, 0.4 mg, Intravenous, Q2 Min PRN **OR** naloxone (NARCAN) injection 0.2 mg, 0.2 mg, Intramuscular, Q2 Min PRN **OR** naloxone (NARCAN) injection 0.4 mg, 0.4 mg, Intramuscular, Q2 Min PRN, Mani Mcqueen MD     Nurse may request from Pharmacy a change of form of medication (e.g. Liquid to tablet)., , Does not apply, Continuous PRN, Lang Blakely PA-C     oxyCODONE (ROXICODONE) tablet 5 mg, 5 mg, Oral, Q4H PRN, Lang Blakely PA-C, 5 mg at 12/25/22 0909     Patient is already receiving anticoagulation with heparin, enoxaparin (LOVENOX), warfarin (COUMADIN)  or other anticoagulant medication, , Does not apply, Continuous PRN, Lang Blakely PA-C     pioglitazone (ACTOS) tablet 45 mg, 45 mg, Oral, Daily, Lang Blakely PA-C, 45 mg at 12/26/22 1001      polyethylene glycol (MIRALAX) Packet 17 g, 17 g, Oral, Daily, Lang Blakely PA-C     senna-docusate (SENOKOT-S/PERICOLACE) 8.6-50 MG per tablet 2 tablet, 2 tablet, Oral, BID, Lang Blakely PA-C     tuberculin injection 5 Units, 5 Units, Intradermal, Q21 Days, Lang Blakely PA-C  No current outpatient prescriptions on file.   PMH: morbid obesity, DM2, HLD, JAIR, PE/DVT on chronic apixaban, venous insufficiency, osteoarthritis, and chronic L hip prosthetic joint infection    Julio Howard, PharmD

## 2022-12-26 NOTE — PROGRESS NOTES
"Contacted [facility], pt's large \"My Pillow\" in navy blue covering was not sent with pt to TCU. They will look around for the pillow. [facility] phone number is [phone].    [phone] - pillow was found on [facility] - they will get it to us. Pt informed.   "

## 2022-12-26 NOTE — PHARMACY-TCU REVIEW OF H&P
I have reviewed this patient's TCU admission History & Physical for medication related changes/recommendations identified by the admitting provider.  I am confirming that there are no recommendations requiring changes to medication orders are indicated at this time based on the provider recommendations in the H&P.

## 2022-12-27 NOTE — PROGRESS NOTES
"   12/26/22 1030   Appointment Info   Signing Clinician's Name / Credentials (PT) Bear Nguyen, PT   Living Environment   People in Home significant other  (Claire)   Current Living Arrangements house   Home Accessibility stairs to enter home;stairs within home   Number of Stairs, Main Entrance 2   Number of Stairs, Within Home, Primary other (see comments)  (1 step to the main level, then 6-7 up (bedroom, shower tub combo) /down (family room, bathroom with walk-in shower and elevated toilet), a full flight down from the lower \"family room\" level to the basement (laundry))   Transportation Anticipated family or friend will provide   Living Environment Comments multilevel house, SO can provide supervision assistance only it seems.   Self-Care   Usual Activity Tolerance fair   Current Activity Tolerance poor   Regular Exercise No   Equipment Currently Used at Home cane, straight   Fall history within last six months no   Number of times patient has fallen within last six months 0   Post-Acute Assessment Only   Post-Acute Functional Assessment See below   Previous Level of Function/Home Environm   Bathing/Grooming, Premorbid Functional Level independent   Dressing, Premorbid Functional Level independent   Eating/Feeding, Premorbid Functional Level independent   Toileting, Premorbid Functional Level independent   Bed Mobility, Premorbid Functional Level independent   Transfers, Premorbid Functional Level uses device or equipment  (cane)   Household Ambulation, Premorbid Functional Level uses device or equipment  (cane)   Stairs, Premorbid Functional Level uses device or equipment  (cane)   Community Ambulation, Premorbid Functional Level uses device or equipment  (cane)   General Information   Onset of Illness/Injury or Date of Surgery 11/22/22   Referring Physician Mani Mcqueen MD   Patient/Family Therapy Goals Statement (PT) \"to get onto a commode\"   Pertinent History of Current Problem (include personal factors " "and/or comorbidities that impact the POC) PerH&P written on 12/24/22, \"Patient is a 71 y/o man who has multiple medical problems including diabetes mellitus type II, rheumatoid arthritis, obstructive sleep apnea and past DVT/PE. Patient had undergone left total hip arthroplasty for left hip osteoarthritis on 07-Mar-2018. 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 Patient apparently was treated with ceftriaxone and amoxicillin following this. Patient had a left hip aspiration in Feb-2021. 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 Patient had fluid aspiration and drain placement on 06-Apr-2022. Cultures grew Cutibacterium species.     Patient last was hospitalized from 22-Nov-2022 to 24-Nov-2022. Patient underwent explantation of left longstem total hip arthroplasty, extended proximal femoral trochanteric osteotomy left femur and manufacture and placement of vancomycin tobramycin impregnated cement spacer implant on 22-Nov-2022 for left hip prosthetic joint infection. Cultures grew Proteus mirabilis and Finegoldia magna. During hospital stay, patient also had acute encephalopathy, acute blood loss anemia, acute hypoxemic respiratory failure and acute kidney injury. Patient received 4 units of pRBCs during hospital stay for acute blood loss anemia. Acute encephalopathy, acute hypoxemic respiratory failure and acute kidney injury had resolved by the time of discharge. Patient was discharged to TCU on 24-Nov-2022.\"   Weight-Bearing Status - LUE full weight-bearing   Weight-Bearing Status - RUE full weight-bearing   Weight-Bearing Status - LLE nonweight-bearing   Weight-Bearing Status - RLE full weight-bearing   Heart Disease Risk Factors Diabetes;Dislipidemia;Lack of physical " activity;Overweight;Stress   General Observations Pt supine in bed, incontinent of bowel and bladder.   Cognition   Cognitive Status Comments Alert and oriented x4.  Communicates needs appropriately   Pain Assessment   Patient Currently in Pain Yes, see Vital Sign flowsheet   Integumentary/Edema   Integumentary/Edema Comments Not addressed   Posture    Posture Comments remained supine this session   Range of Motion (ROM)   ROM Comment R LE WFL.  PROM to L LE only with very minimal movement due to pain   Strength (Manual Muscle Testing)   Strength Comments R LE WFL in supine.  R LE not assistance due to NWB status   Sensory Examination   Sensory Perception Comments Pt c/o numbness/tingling in R fingers for the past x3 weeks   Coordination   Coordination Comments not tested   Muscle Tone   Muscle Tone Comments not tested   Clinical Impression   Criteria for Skilled Therapeutic Intervention Yes, treatment indicated   PT Diagnosis (PT) s/p prosthetic joint infection, s/o L BELLA antibiotic spacer   Influenced by the following impairments decreased strength, decreased ROM, impaired balance,   Functional limitations due to impairments impaired bed mobility, transfers, ambulation, stairs, balance, activity tolerance,   Clinical Presentation (PT Evaluation Complexity) Evolving/Changing   Clinical Presentation Rationale NWB L LE, obesity, medical complexity, prolonged hospitalization   Clinical Decision Making (Complexity) moderate complexity   Planned Therapy Interventions (PT) balance training;bed mobility training;gait training;home exercise program;manual therapy techniques;motor coordination training;neuromuscular re-education;orthotic fitting/training;patient/family education;postural re-education;ROM (range of motion);stair training;strengthening;stretching;wheelchair management/propulsion training;progressive activity/exercise;risk factor education;home program guidelines   Anticipated Equipment Needs at Discharge (PT)  "other (see comments)  (pt has a walker, wheelchair and SEC at home.)   Risk & Benefits of therapy have been explained evaluation/treatment results reviewed;care plan/treatment goals reviewed;risks/benefits reviewed;current/potential barriers reviewed;participants voiced agreement with care plan;participants included;patient   Clinical Impression Comments 72 y.o. male s/p BELLA ABX spacer placement on 11/22/22.  Prior to admission, pt was independent with basic mobility and ADLs using a single end cane.  Currently, pt is very painful and can partially roll to the L with min A and use of bed rail.  Pt states that he cannot fully roll over to his L side, declined attempting to roll to his R side due to pain, and declined sitting up at the EOB due to pain and feeling his L hip \"click around in there\" with any movement.  Pt would benefit from continued skilled PT intervention to maximize mobility and independence with mobility and ADLs.   PT Total Evaluation Time   PT Eval, Moderate Complexity Minutes (38906) 60   Therapy Certification   Start of care date 12/26/22   Certification date from 12/26/22   Certification date to 01/26/23   Medical Diagnosis s/p prosthetic joint infection, s/o L BELLA antibiotic spacer   Physical Therapy Goals   PT Frequency 6x/week   PT Predicted Duration/Target Date for Goal Attainment 01/24/23   PT Goals Bed Mobility;Transfers;Gait;Stairs;PT Goal 1   PT: Bed Mobility Modified independent   PT: Transfers Modified independent   PT: Gait Modified independent  (50 feet with RW)   PT: Stairs Supervision/stand-by assist;6 stairs  (with SEC and hand rail)   PT: Goal 1 Pt will demonstrate getting in/out of a car using a RW for support with SBA   PT Discharge Planning   PT Plan Needs to be seen by PT to continue assessment.   PT Discharge Recommendation (DC Rec) home with home care physical therapy;home with assist  (vs LTCF pending progress with therapy)   PT Rationale for DC Rec PT:  pt is currently " only partially rolling to his L side.  Despite education on rolling with assistance from staff, pt requested to wait until a ceiling lift arrived to help get him to the EOB.  Pain is also not currently controlled with activity, per pt report.   Total Session Time   Total Session Time (sum of timed and untimed services) 60   Post Acute Settings Only   What unit is patient on? TCU   PT - Acute Rehab Center Time   Individual Time (minutes) - enter zero if not applicable - PT 60   Group Time (minutes) - enter zero if not applicable  - PT 0   Concurrent Time (minutes) - enter zero if not applicable  - PT 0   Co-Treatment Time (minutes) - enter zero if not applicable  - PT 0   ARC Total Session Time (minutes) - PT 60   Bed Mobility: Turning side to side/Roll Left and Right   Patient Performance Total dependence (helper does ALL of the effort)   Staff Performance Two +person assist (two plus persons physical assist)   Describe Performance min A to partially roll to R side   Bed Mobility: Sit to lying   Reason Not Done Resident refused to perform   Bed Mobility: Lying to sitting on the side of bed   Reason Not Done Resident refused to perform   Transfers: Sit to Stand   Reason Not Done Resident refused to perform   Transfers: Chair/Bed transfers   Reason Not Done Resident refused to perform   Ambulation   Walks in room: Reason Not Done Safety concerns   Walk 10 Feet   Reason Not Done Safety concerns   Walk 10 Feet on uneven surfaces   Reason Not Done Safety concerns   Walk 50 Feet with Two Turns   Reason Not Done Safety concerns   Walk 150 Feet   Reason Not Done Safety concerns   Wheel 50 Feet   Reason Not Done Activity not applicable   Wheel 150 Feet   Reason Not Done Activity not applicable   1 Step (curb)   Reason Not Done Safety concerns   4 Steps   Reason Not Done Safety concerns   12 Steps   Reason Not Done Activity not applicable   Car Transfer   Reason Not Done Safety concerns   Picking up Object   Reason Not Done  Safety concerns

## 2022-12-27 NOTE — PROGRESS NOTES
[facility]    Division of Infectious Diseases and International Medicine    CLINICAL INFECTIOUS DISEASE OUTPATIENT CONSULTATION NOTE     Patient:  Waldo Bustos, Date of birth 1950, Medical record number [id number]  Date of Visit:  December 28, 2022  Referred by: No ref. provider found   Reason for Visit: follow-up PJI         Assessment and Plan     1. Chronic Left hip PJI   2. Left hip arthroplasty 3/7/2018   - 1/23/2019, revision left total hip arthroplasty . Cutibacterium acnes s/p  4 weeks of Ceftriaxone  - 2/2021, L hip aspiration   - 4/6/2022, C acnes  - 4/27/2022, drain placement. C acnes. (not removed)  - s/p  Explantation of Chronic  Infected Left Total Hip Arthroplasty, Extended Trochanteric Osteotomy with Extensive Debridement and Reconstruction Using Antibiotic Cement Spacer on 11/22/2022 .   - 11/14/22 - left hip aspirate 11/14/22 - 3+ Proteus mirabilis , 4+ Porphyromonas sp.   - 11/22 cultures intra op left hip tissues cultures Proteus mirabilis, Finegoldia magna (both pan-S)    Overall, the patient is doing well approximately 5 weeks into his course. Some mild nausea related to metronidazole but has been taking both (TCU helping with adherence) and feels he can tolerate the meds for another week. Nothing on history, exam, or labs to suggest continued infection/treatment failure. He is not on C. Acnes treatment despite growing twice in the past, though multiple cultures never grew it from his most recent infection and he is doing well despite no C. Acnes-directed therapy.     I would recommend stopping cipro + metronidazole as planned for a 6 week course for GNR/anaerobic PJI with sinus tract, all hardware has been removed. He is higher risk for treatment failure given his comorbidities, sinus tract, and history of PJI. He will have a period off antibiotics and see Dr. Meyers in follow-up in the next month or two to discuss surgical option going forward and defer  this decision to Dr. Meyers.    He can follow-up with ID as needed, pending surgical planning.    Ramez Cronin MD PharmD  Adult Infectious Disease Fellow PGY4  Pager: [phone]       History of Present Illness:     Waldo Bustos is a wonderful 72 year old y.o who presented today for follow-up of left hip periprosthetic infection  He has a history of DVT/PE on apixaban, CAD, obesity, JAIR, T2DM, osteoarthritis and was s/p left BELLA in 2018 who was admitted for planned I&D with debridement and explantation of left BELLA on 11/22 - he had initially presented to ortho clinic due to hip drainage. Operation was notable for presence of a sinus tract tracking to the hip joint which was debrided. There was purulence noted during the procedure with 5 cultures sent. Antibiotic (vanc + tobra) cement spacer (acetabular only due to mechanical issues with femoral stem) placed.     He was started on vancomycin and Zosyn and narrowed to ciprofloxacin + metronidazole since cultures from 11/14 hip aspirate showed Finegoldia and Proteus Mirabilis (aspirate also grew porphyromonas sp). This duration was planned to end on 1/3/23 (6 weeks) and he is seeing me in follow-up to discuss.    Hospital course also noted for mild HALEY which had resolved prior to discharge.    He has since been discharged to rehab (TCU) on 12/24. He is not sure when he sees ortho. Per the patient, he is planned to be off antibiotics for around 2 months to see if the infection is treated prior to deciding if re-implantation. His drains have been removed.    He reports no fevers/chills. Some nausea he relates to the metronidazole. He has 1-2 loose stools a day but they are not watery or diarrhea. No abdominal pain, itching. Has unilateral tingling of the 2nd and 3rd fingers of his left hand.         Key Prior Lab/Imaging and other data     12/21 labs  Cr: 0.99  WBC normal  CRP 6.1    11/22 left hip intra-op cultures: Proteus mirabilis and Finegoldia magna  (same susceptibilities)     Collected 11/14/2022 12:35 PM      Status: Final result      Visible to patient: Yes (seen)      Dx: Prosthetic joint infection, initial e...     Specimen Information: Hip, Left; Aspirate    0 Result Notes  Culture 3+ Proteus mirabilis Abnormal             Resulting Agency: [facility]     Susceptibility     Proteus mirabilis     MARCOS     Ampicillin <=2 ug/mL Susceptible     Ampicillin/ Sulbactam <=2 ug/mL Susceptible     Cefepime <=1 ug/mL Susceptible     Ceftazidime <=1 ug/mL Susceptible     Ceftriaxone <=1 ug/mL Susceptible     Ciprofloxacin <=0.25 ug/mL Susceptible     Gentamicin 4 ug/mL Susceptible     Levofloxacin <=0.12 ug/mL Susceptible     Meropenem <=0.25 ug/mL Susceptible     Piperacillin/Tazobactam <=4 ug/mL Susceptible     Tobramycin 2 ug/mL Susceptible     Trimethoprim/Sulfamethoxazole <=1/19 ug/mL Susceptible                    Collected 11/14/2022 12:35 PM      Status: Edited Result - FINAL      Visible to patient: Yes (seen)      Dx: Prosthetic joint infection, initial e...     Specimen Information: Hip, Left; Aspirate    0 Result Notes  Culture 4+ Porphyromonas species Abnormal     Beta Lactamase Positive   Identification obtained by MALDI-TOF mass spectrometry research use only database. Test characteristics determined and verified by the [facility].   1+ Finegoldia magna Abnormal             Resulting Agency: [facility]     Susceptibility     Porphyromonas species Finegoldia magna     AMRCOS MARCOS     Amoxicillin/Clavulanate 0.032 ug/mL Susceptible 0.125 ug/mL Susceptible     Cefotaxime 0.19 ug/mL Susceptible 1.5 ug/mL Susceptible     Clindamycin   0.094 ug/mL Susceptible     Meropenem 0.032 ug/mL Susceptible       metronidazole 0.023 ug/mL Susceptible 0.38 ug/mL Susceptible     Penicillin   0.032 ug/mL Susceptible                        Review of Systems:     The following systems were reviewed with the patient as they pertain to the case and are negative  unless noted here or above in the HPI. The patient was  able to participate in the following review of systems    REVIEW OF SYSTEMS:     Constitutional: No fevers, no chills, no sweats  Cardiac: No history of chest pain, No palpitations  Respiratory: No shortness of breath, no wheeze, no cough  Gastro Intestinal: No history of abdominal pain, no vomiting, no diarrhea  Neurological: No headaches, no new or changing weakness, no new or changing numbness  Musculoskeletal: No joint pain or swelling HEENT: No congestion No stridor  Psychiatric: No reported hallucinations, No obvious delusions  Allergic: No Hives No Rash  Hematologic: No Easy Bruising, No Nosebleeds,   Genitourinary: No Dysuria, No Frequency  Endocrine: No Polyuria, No Polydipsia  Skin/Integumentary: No Rash, No Ulcer  Opthalmologic: No Diplopia, No Flashes        Other Medical History:     Attempt was made to collect past, family and social history during this encounter,  this information was reviewed with the patient and updated    Allergies Sulfa drugs and Tizanidine    Past Medical History  Past Medical History:   Diagnosis Date     Arthritis 5 years ago     Chronic osteoarthritis Not sure     Diabetes (H) 10-12 years ago     DVT (deep venous thrombosis) (H)      Dyslipidemia      Gastroesophageal reflux disease      History of blood transfusion      Iliopsoas abscess (H)      Infection of prosthetic hip joint (H)      JAIR (obstructive sleep apnea)      Pulmonary embolism (H)      RA (rheumatoid arthritis) (H) Not sure     Reduced vision 2-3 years ago    Cataract Surgery on both eyes     Venous insufficiency     PastSurgical History   has a past surgical history that includes PELVIS/HIP JOINT SURGERY UNLISTED; STOMACH SURGERY PROCEDURE UNLISTED (1955); Cataract; Egd; colonoscopy; IR IVC Filter Placement; Remove hardware arthroplasty hip (Left, 11/22/2022); and Irrigation And Debridement Hip, Place An (Left, 11/22/2022).   Family History  Family  History   Adopted: Yes   Problem Relation Age of Onset     Diabetes No family hx of      Rheumatoid Arthritis No family hx of      Low Back Problems No family hx of      Unknown/Adopted No family hx of     Social History  He reports that he has never smoked. He has never used smokeless tobacco. He reports that he does not drink alcohol and does not use drugs.     Has a girlfriend in [place]. Works as a musician (guitar/bass).   Notable Exposures Listed below if pertinent   None Vaccination History:  Immunization History   Administered Date(s) Administered     Flu 65+ Years 11/24/2017, 09/25/2018     Influenza (High Dose) 3 valent vaccine 01/30/2017     Influenza (IIV3) PF 11/10/2005, 10/29/2007, 10/29/2008, 09/29/2010, 11/06/2012, 09/30/2013     Pneumo Conj 13-V (2010&after) 01/30/2017     Pneumococcal 23 valent 04/08/2003, 10/29/2007     TDAP Vaccine (Boostrix) 09/30/2013             Physical Exam:     VITAL SIGNS:  /64 (BP Location: Right arm, Patient Position: Sitting, Cuff Size: Adult Large)   Pulse 85   Temp 97.3  F (36.3  C) (Oral)   SpO2 92%       GENERAL APPEARANCE: Not in acute distress, lying in a stretcher, comfortable    PHYSICAL EXAM:  Eyes:     no ptosis, no discharge, no scleral icterus  Mouth, Throat:     mucous membranes moist  Cardiovascular:    Inspection: No Cyanosis, JVD not elevated   Auscultation:  S1, S2 normal, regular rate and rhythm  Respiratory:     Inspection: Not in respiratory distress, Chest expansion symmetrical   Auscultation: 4 point auscultation done clear to auscultation bilaterally, no wheezes, no rales, and no rhonchi  Gastrointestinal:      soft, non-tender; bowel sounds normal; no masses,  no organomegaly  Musculoskeletal:     no elbow wrist knee or ankle tenderness, deformity or swelling, no quadriceps calf or upper arm muscular tenderness noted  Skin:     Dry and intact. Left hip with large longitudinal incision with Steri-strips in place, no caridad-incisional  erythema, drainage, swelling, or foul odor. Left hip and incision non-tender to palpation. Exam limited by patient mobility.  Neurologic:     Higher Mental Function: Conversant, AOx4   Moving all extremities spontaneously and to command  Psychiatric:     appropriate      Signature:     Ramez Cronin MD   PGY4 Infectious Disease Fellow    Case discussed with Supervising attending ID physician

## 2022-12-27 NOTE — PROGRESS NOTES
"   12/26/22 1030   Appointment Info   Signing Clinician's Name / Credentials (PT) Bear Nguyen, PT   Living Environment   People in Home significant other  (Claire)   Current Living Arrangements house   Home Accessibility stairs to enter home;stairs within home   Number of Stairs, Main Entrance 2   Number of Stairs, Within Home, Primary other (see comments)  (1 step to the main level, then 6-7 up (bedroom, shower tub combo) /down (family room, bathroom with walk-in shower and elevated toilet), a full flight down from the lower \"family room\" level to the basement (laundry))   Transportation Anticipated family or friend will provide   Living Environment Comments multilevel house, SO can provide supervision assistance only it seems.   Self-Care   Usual Activity Tolerance fair   Current Activity Tolerance poor   Regular Exercise No   Equipment Currently Used at Home cane, straight   Fall history within last six months no   Number of times patient has fallen within last six months 0   Post-Acute Assessment Only   Post-Acute Functional Assessment See below   Previous Level of Function/Home Environm   Bathing/Grooming, Premorbid Functional Level independent   Dressing, Premorbid Functional Level independent   Eating/Feeding, Premorbid Functional Level independent   Toileting, Premorbid Functional Level independent   Bed Mobility, Premorbid Functional Level independent   Transfers, Premorbid Functional Level uses device or equipment  (cane)   Household Ambulation, Premorbid Functional Level uses device or equipment  (cane)   Stairs, Premorbid Functional Level uses device or equipment  (cane)   Community Ambulation, Premorbid Functional Level uses device or equipment  (cane)   General Information   Onset of Illness/Injury or Date of Surgery 11/22/22   Referring Physician Mani Mcqueen MD   Patient/Family Therapy Goals Statement (PT) \"to get onto a commode\"   Pertinent History of Current Problem (include personal factors " "and/or comorbidities that impact the POC) PerH&P written on 12/24/22, \"Patient is a 73 y/o man who has multiple medical problems including diabetes mellitus type II, rheumatoid arthritis, obstructive sleep apnea and past DVT/PE. Patient had undergone left total hip arthroplasty for left hip osteoarthritis on 07-Mar-2018. 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 Patient apparently was treated with ceftriaxone and amoxicillin following this. Patient had a left hip aspiration in Feb-2021. 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 Patient had fluid aspiration and drain placement on 06-Apr-2022. Cultures grew Cutibacterium species.     Patient last was hospitalized from 22-Nov-2022 to 24-Nov-2022. Patient underwent explantation of left longstem total hip arthroplasty, extended proximal femoral trochanteric osteotomy left femur and manufacture and placement of vancomycin tobramycin impregnated cement spacer implant on 22-Nov-2022 for left hip prosthetic joint infection. Cultures grew Proteus mirabilis and Finegoldia magna. During hospital stay, patient also had acute encephalopathy, acute blood loss anemia, acute hypoxemic respiratory failure and acute kidney injury. Patient received 4 units of pRBCs during hospital stay for acute blood loss anemia. Acute encephalopathy, acute hypoxemic respiratory failure and acute kidney injury had resolved by the time of discharge. Patient was discharged to TCU on 24-Nov-2022.\"   Weight-Bearing Status - LUE full weight-bearing   Weight-Bearing Status - RUE full weight-bearing   Weight-Bearing Status - LLE nonweight-bearing   Weight-Bearing Status - RLE full weight-bearing   Heart Disease Risk Factors Diabetes;Dislipidemia;Lack of physical " activity;Overweight;Stress   General Observations Pt supine in bed, incontinent of bowel and bladder.   Cognition   Cognitive Status Comments Alert and oriented x4.  Communicates needs appropriately   Pain Assessment   Patient Currently in Pain Yes, see Vital Sign flowsheet   Integumentary/Edema   Integumentary/Edema Comments Not addressed   Posture    Posture Comments remained supine this session   Range of Motion (ROM)   ROM Comment R LE WFL.  PROM to L LE only with very minimal movement due to pain   Strength (Manual Muscle Testing)   Strength Comments R LE WFL in supine.  R LE not assistance due to NWB status   Sensory Examination   Sensory Perception Comments Pt c/o numbness/tingling in R fingers for the past x3 weeks   Coordination   Coordination Comments not tested   Muscle Tone   Muscle Tone Comments not tested   Clinical Impression   Criteria for Skilled Therapeutic Intervention Yes, treatment indicated   PT Diagnosis (PT) s/p prosthetic joint infection, s/o L BELLA antibiotic spacer   Influenced by the following impairments decreased strength, decreased ROM, impaired balance,   Functional limitations due to impairments impaired bed mobility, transfers, ambulation, stairs, balance, activity tolerance,   Clinical Presentation (PT Evaluation Complexity) Evolving/Changing   Clinical Presentation Rationale NWB L LE, obesity, medical complexity, prolonged hospitalization   Clinical Decision Making (Complexity) moderate complexity   Planned Therapy Interventions (PT) balance training;bed mobility training;gait training;home exercise program;manual therapy techniques;motor coordination training;neuromuscular re-education;orthotic fitting/training;patient/family education;postural re-education;ROM (range of motion);stair training;strengthening;stretching;wheelchair management/propulsion training;progressive activity/exercise;risk factor education;home program guidelines   Anticipated Equipment Needs at Discharge (PT)  "other (see comments)  (pt has a walker, wheelchair and SEC at home.)   Risk & Benefits of therapy have been explained evaluation/treatment results reviewed;care plan/treatment goals reviewed;risks/benefits reviewed;current/potential barriers reviewed;participants voiced agreement with care plan;participants included;patient   Clinical Impression Comments 72 y.o. male s/p BELLA ABX spacer placement on 11/22/22.  Prior to admission, pt was independent with basic mobility and ADLs using a single end cane.  Currently, pt is very painful and can partially roll to the L with min A and use of bed rail.  Pt states that he cannot fully roll over to his L side, declined attempting to roll to his R side due to pain, and declined sitting up at the EOB due to pain and feeling his L hip \"click around in there\" with any movement.  Pt would benefit from continued skilled PT intervention to maximize mobility and independence with mobility and ADLs.   PT Total Evaluation Time   PT Eval, Moderate Complexity Minutes (97009) 60   Physical Therapy Goals   PT Frequency 6x/week   PT Predicted Duration/Target Date for Goal Attainment 01/24/23   PT Goals Bed Mobility;Transfers;Gait;Stairs;PT Goal 1   PT: Bed Mobility Modified independent   PT: Transfers Modified independent   PT: Gait Modified independent  (50 feet with RW)   PT: Stairs Supervision/stand-by assist;6 stairs  (with SEC and hand rail)   PT: Goal 1 Pt will demonstrate getting in/out of a car using a RW for support with SBA   PT Discharge Planning   PT Plan Needs to be seen by PT to continue assessment.   PT Discharge Recommendation (DC Rec) home with home care physical therapy;home with assist  (vs LTCF pending progress with therapy)   PT Rationale for DC Rec PT:  pt is currently only partially rolling to his L side.  Despite education on rolling with assistance from staff, pt requested to wait until a ceiling lift arrived to help get him to the EOB.  Pain is also not currently " controlled with activity, per pt report.   Total Session Time   Total Session Time (sum of timed and untimed services) 60   Post Acute Settings Only   What unit is patient on? TCU   PT - Acute Rehab Center Time   Individual Time (minutes) - enter zero if not applicable - PT 60   Group Time (minutes) - enter zero if not applicable  - PT 0   Concurrent Time (minutes) - enter zero if not applicable  - PT 0   Co-Treatment Time (minutes) - enter zero if not applicable  - PT 0   ARC Total Session Time (minutes) - PT 60   Bed Mobility: Turning side to side/Roll Left and Right   Patient Performance Total dependence (helper does ALL of the effort)   Staff Performance Two +person assist (two plus persons physical assist)   Describe Performance min A to partially roll to R side   Bed Mobility: Sit to lying   Reason Not Done Resident refused to perform   Bed Mobility: Lying to sitting on the side of bed   Reason Not Done Resident refused to perform   Transfers: Sit to Stand   Reason Not Done Resident refused to perform   Transfers: Chair/Bed transfers   Reason Not Done Resident refused to perform   Ambulation   Walks in room: Reason Not Done Safety concerns   Walk 10 Feet   Reason Not Done Safety concerns   Walk 10 Feet on uneven surfaces   Reason Not Done Safety concerns   Walk 50 Feet with Two Turns   Reason Not Done Safety concerns   Walk 150 Feet   Reason Not Done Safety concerns   Wheel 50 Feet   Reason Not Done Activity not applicable   Wheel 150 Feet   Reason Not Done Activity not applicable   1 Step (curb)   Reason Not Done Safety concerns   4 Steps   Reason Not Done Safety concerns   12 Steps   Reason Not Done Activity not applicable   Car Transfer   Reason Not Done Safety concerns   Picking up Object   Reason Not Done Safety concerns

## 2022-12-27 NOTE — PROGRESS NOTES
MDS Pain Assessment    The following is the pain interview as conducted by the TCU RN caring for the patient on December / 28 / 2022. This assessment is required by the [facility] for all patients in [place] [facility] (Skilled Nursing Facilities).       1. Have you had pain or hurting at any time in the last 5 days? Yes    2. How much of the time have you experienced pain or hurting over the last 5 days? frequently    3. Over the past 5 days, has pain made it hard for you to sleep at night? Yes    4. Over the past 5 days, have you limited your day-to-day activities because of pain? Yes    5. Pain intensity (Numeric scale used first-if patient unable to answer, verbal scale to be used.)    Numeric Scale: Please rate your worst pain over the last 5 days on a zero to ten scale, with zero being no pain and ten being the worst pain you can imagine.     8    Verbal Scale: USED ONLY if unable to give numeric, otherwise N/A  Please rate the intensity of your worst pain over the last 5 days.     not applicable

## 2022-12-27 NOTE — PLAN OF CARE
0700H-2300H   Goal Outcome Evaluation:  A/O x 4, afebrile, denied SOB, was sleeping earlier at the beginning of the shift, slept through 1000H , requested to take meds late in AM  than scheduled as he has having upset stomach , loose BM x 1 this AM ( Senna and Miralax not given) . Mentioned that appetite has been poor, has not eat anything from yesterday. Had some chicken broth and ginger ale this AM. Throw up x 1 after taken Flagyl. Notfied provider, with new order for Zofran PRN and Tums PRN  both given x 1, somewhat effective per patient. Left Hip wound site with intact steri strips. Left lower PIV intact, flushed, saline locked. Refused repositioning due to discomfort/ pain , Oxycodone X 1 given before therapy , lift and sling being use for incontinence cares . Had another soft BM at noon, refused all BM meds today. Comfortable at this time. To continue POC.      B,113,110    Patient's most recent vital signs are:     Vital signs:  BP: 122/54  Temp: 97  HR: 89  RR: 18  SpO2: 95 %     Patient does not have new respiratory symptoms.  Patient does not have new sore throat.  Patient does not have a fever greater than 99.5.

## 2022-12-27 NOTE — PLAN OF CARE
Goal Outcome Evaluation:    Patient is alert and oriented x 4. He is able to make his needs known. Pt denied SOB and chest pain. He also denied nausea and vomited. He is able to swallow without difficulty but prefers to take meds with apple sauce. Pt family came to visit.    Patient's most recent vital signs are:     Vital signs:  BP: 140/67  Temp: 96.8  HR: 74  RR: 16  SpO2: 95 %     Patient does not have new respiratory symptoms.  Patient does not have new sore throat.  Patient does not have a fever greater than 99.5.

## 2022-12-27 NOTE — PROGRESS NOTES
"   12/26/22 1030   Appointment Info   Signing Clinician's Name / Credentials (PT) Bear Nguyen, PT   Living Environment   People in Home significant other  (Claire)   Current Living Arrangements house   Home Accessibility stairs to enter home;stairs within home   Number of Stairs, Main Entrance 2   Number of Stairs, Within Home, Primary other (see comments)  (1 step to the main level, then 6-7 up (bedroom, shower tub combo) /down (family room, bathroom with walk-in shower and elevated toilet), a full flight down from the lower \"family room\" level to the basement (laundry))   Transportation Anticipated family or friend will provide   Living Environment Comments multilevel house, SO can provide supervision assistance only it seems.   Self-Care   Usual Activity Tolerance fair   Current Activity Tolerance poor   Regular Exercise No   Equipment Currently Used at Home cane, straight   Fall history within last six months no   Number of times patient has fallen within last six months 0   Post-Acute Assessment Only   Post-Acute Functional Assessment See below   Previous Level of Function/Home Environm   Bathing/Grooming, Premorbid Functional Level independent   Dressing, Premorbid Functional Level independent   Eating/Feeding, Premorbid Functional Level independent   Toileting, Premorbid Functional Level independent   Bed Mobility, Premorbid Functional Level independent   Transfers, Premorbid Functional Level uses device or equipment  (cane)   Household Ambulation, Premorbid Functional Level uses device or equipment  (cane)   Stairs, Premorbid Functional Level uses device or equipment  (cane)   Community Ambulation, Premorbid Functional Level uses device or equipment  (cane)   General Information   Onset of Illness/Injury or Date of Surgery 11/22/22   Referring Physician Mani Mcqueen MD   Patient/Family Therapy Goals Statement (PT) \"to get onto a commode\"   Pertinent History of Current Problem (include personal factors " "and/or comorbidities that impact the POC) PerH&P written on 12/24/22, \"Patient is a 71 y/o man who has multiple medical problems including diabetes mellitus type II, rheumatoid arthritis, obstructive sleep apnea and past DVT/PE. Patient had undergone left total hip arthroplasty for left hip osteoarthritis on 07-Mar-2018. 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 Patient apparently was treated with ceftriaxone and amoxicillin following this. Patient had a left hip aspiration in Feb-2021. 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 Patient had fluid aspiration and drain placement on 06-Apr-2022. Cultures grew Cutibacterium species.     Patient last was hospitalized from 22-Nov-2022 to 24-Nov-2022. Patient underwent explantation of left longstem total hip arthroplasty, extended proximal femoral trochanteric osteotomy left femur and manufacture and placement of vancomycin tobramycin impregnated cement spacer implant on 22-Nov-2022 for left hip prosthetic joint infection. Cultures grew Proteus mirabilis and Finegoldia magna. During hospital stay, patient also had acute encephalopathy, acute blood loss anemia, acute hypoxemic respiratory failure and acute kidney injury. Patient received 4 units of pRBCs during hospital stay for acute blood loss anemia. Acute encephalopathy, acute hypoxemic respiratory failure and acute kidney injury had resolved by the time of discharge. Patient was discharged to TCU on 24-Nov-2022.\"   Weight-Bearing Status - LUE full weight-bearing   Weight-Bearing Status - RUE full weight-bearing   Weight-Bearing Status - LLE nonweight-bearing   Weight-Bearing Status - RLE full weight-bearing   Heart Disease Risk Factors Diabetes;Dislipidemia;Lack of physical " activity;Overweight;Stress   General Observations Pt supine in bed, incontinent of bowel and bladder.   Cognition   Cognitive Status Comments Alert and oriented x4.  Communicates needs appropriately   Pain Assessment   Patient Currently in Pain Yes, see Vital Sign flowsheet   Integumentary/Edema   Integumentary/Edema Comments Not addressed   Posture    Posture Comments remained supine this session   Range of Motion (ROM)   ROM Comment R LE WFL.  PROM to L LE only with very minimal movement due to pain   Strength (Manual Muscle Testing)   Strength Comments R LE WFL in supine.  R LE not assistance due to NWB status   Sensory Examination   Sensory Perception Comments Pt c/o numbness/tingling in R fingers for the past x3 weeks   Coordination   Coordination Comments not tested   Muscle Tone   Muscle Tone Comments not tested   Clinical Impression   Criteria for Skilled Therapeutic Intervention Yes, treatment indicated   PT Diagnosis (PT) s/p prosthetic joint infection, s/o L BELLA antibiotic spacer   Influenced by the following impairments decreased strength, decreased ROM, impaired balance,   Functional limitations due to impairments impaired bed mobility, transfers, ambulation, stairs, balance, activity tolerance,   Clinical Presentation (PT Evaluation Complexity) Evolving/Changing   Clinical Presentation Rationale NWB L LE, obesity, medical complexity, prolonged hospitalization   Clinical Decision Making (Complexity) moderate complexity   Planned Therapy Interventions (PT) balance training;bed mobility training;gait training;home exercise program;manual therapy techniques;motor coordination training;neuromuscular re-education;orthotic fitting/training;patient/family education;postural re-education;ROM (range of motion);stair training;strengthening;stretching;wheelchair management/propulsion training;progressive activity/exercise;risk factor education;home program guidelines   Anticipated Equipment Needs at Discharge (PT)  "other (see comments)  (pt has a walker, wheelchair and SEC at home.)   Risk & Benefits of therapy have been explained evaluation/treatment results reviewed;care plan/treatment goals reviewed;risks/benefits reviewed;current/potential barriers reviewed;participants voiced agreement with care plan;participants included;patient   Clinical Impression Comments 72 y.o. male s/p BELLA ABX spacer placement on 11/22/22.  Prior to admission, pt was independent with basic mobility and ADLs using a single end cane.  Currently, pt is very painful and can partially roll to the L with min A and use of bed rail.  Pt states that he cannot fully roll over to his L side, declined attempting to roll to his R side due to pain, and declined sitting up at the EOB due to pain and feeling his L hip \"click around in there\" with any movement.  Pt would benefit from continued skilled PT intervention to maximize mobility and independence with mobility and ADLs.   PT Total Evaluation Time   PT Eval, Moderate Complexity Minutes (60825) 60   Physical Therapy Goals   PT Frequency 6x/week   PT Predicted Duration/Target Date for Goal Attainment 01/24/23   PT Goals Bed Mobility;Transfers;Gait;Stairs;PT Goal 1   PT: Bed Mobility Modified independent   PT: Transfers Modified independent   PT: Gait Modified independent  (50 feet with RW)   PT: Stairs Supervision/stand-by assist;6 stairs  (with SEC and hand rail)   PT: Goal 1 Pt will demonstrate getting in/out of a car using a RW for support with SBA   PT Discharge Planning   PT Plan Needs to be seen by PT to continue assessment.   PT Discharge Recommendation (DC Rec) home with home care physical therapy;home with assist  (vs LTCF pending progress with therapy)   PT Rationale for DC Rec PT:  pt is currently only partially rolling to his L side.  Despite education on rolling with assistance from staff, pt requested to wait until a ceiling lift arrived to help get him to the EOB.  Pain is also not currently " controlled with activity, per pt report.   Total Session Time   Total Session Time (sum of timed and untimed services) 60   Post Acute Settings Only   What unit is patient on? TCU   PT - Acute Rehab Center Time   Individual Time (minutes) - enter zero if not applicable - PT 60   Group Time (minutes) - enter zero if not applicable  - PT 0   Concurrent Time (minutes) - enter zero if not applicable  - PT 0   Co-Treatment Time (minutes) - enter zero if not applicable  - PT 0   ARC Total Session Time (minutes) - PT 60   Bed Mobility: Turning side to side/Roll Left and Right   Patient Performance Total dependence (helper does ALL of the effort)   Staff Performance Two +person assist (two plus persons physical assist)   Describe Performance min A to partially roll to R side   Bed Mobility: Sit to lying   Reason Not Done Resident refused to perform   Bed Mobility: Lying to sitting on the side of bed   Reason Not Done Resident refused to perform   Transfers: Sit to Stand   Reason Not Done Resident refused to perform   Transfers: Chair/Bed transfers   Reason Not Done Resident refused to perform   Ambulation   Walks in room: Reason Not Done Safety concerns   Walk 10 Feet   Reason Not Done Safety concerns   Walk 10 Feet on uneven surfaces   Reason Not Done Safety concerns   Walk 50 Feet with Two Turns   Reason Not Done Safety concerns   Walk 150 Feet   Reason Not Done Safety concerns   Wheel 50 Feet   Reason Not Done Activity not applicable   Wheel 150 Feet   Reason Not Done Activity not applicable   1 Step (curb)   Reason Not Done Safety concerns   4 Steps   Reason Not Done Safety concerns   12 Steps   Reason Not Done Activity not applicable   Car Transfer   Reason Not Done Safety concerns   Picking up Object   Reason Not Done Safety concerns

## 2022-12-27 NOTE — PLAN OF CARE
Patient is alert and oriented x4. No SOB and CP noted. Able to make needs known. NWB on his LLE. No complain of any nausea and discomfort during shift. No PRN medications given. Sleeping all throughout the night. Safety rounds done. No acute event happen during shift. Continue with plan of care.      Patient's most recent vital signs are:     Vital signs:  BP: 140/67  Temp: 96.8  HR: 74  RR: 16  SpO2: 95 %     Patient does not have new respiratory symptoms.  Patient does not have new sore throat.  Patient does not have a fever greater than 99.5.

## 2022-12-27 NOTE — PROGRESS NOTES
12/27/22 1100   Name of Certified Therapeutic Rec Specialist   Name of Certified Therapeutic Rec Specialist KUMAR Riggs   Appointment Type   Type of Therapeutic Rec Session Therapeutic Rec Assessment   General Information   Patient Profile Review See Profile for full history and prior level of function   Daily Contact with Relatives or Friends Phone call;Visit   Pets Other (see comments)  (loves dogs)   Community Involvement   Community Involvement Disabled   Spiritual Practice Not connected/interested   Sees [facility] ? No   Outings Other (comments)  (weekly lunch with friends)   Music   Music Preferences Country;Rock   Listens to Recorded Music Yes   Brought Own Equipment Yes   Media   Computer Has own at home;Has laptop here;Will use tablet/phone   TV / Movies TV   Sports / Physical Activities   Outdoor Activities Lawn / yard care   Sports Fan Baseball;Basketball;Football;Hockey   Impression   Open to Socializing with Others Independent   Barriers to Leisure Mobility   Patient, family and / or staff in agreement with Plan of Care Yes   Treatment Plan   Interested in Unit [place]? No   Type of Intervention Independent with activity   Equipment and Supplies While on Unit None needed   Assessment Assessment completed, pt was oriented to role of rec therapy and provided with leisure resource list. Pt has no leisure needs at this time. Will provide materials as requested

## 2022-12-28 NOTE — PROGRESS NOTES
Brief Medicine Note    Patient declined routine admit [place]. Quant gold ordered per TCU protocol.     Mild leukocytosis noted on yesterday's labs has returned to normal range today.     Has ID clinic follow up scheduled for today.     Liliya Lai PA-C  Hospitalist Service  Contact information available via [facility] Paging/Directory

## 2022-12-28 NOTE — PLAN OF CARE
Goal Outcome Evaluation:     Plan of Care Reviewed With: patient    Overall Patient Progress: no change    Pt has no c/o pain or discomfort this shift  Using urinal in bed, staff empties. Pt has no c/o SOB and no s/s of respiratory issue noted at RA.  Appear to be sleeping/resting, snoring during rounds. Continue with plan of care.      Patient's most recent vital signs are:     Vital signs:  BP: 122/54  Temp: 97  HR: 89  RR: 18  SpO2: 95 %     Patient does not have new respiratory symptoms.  Patient does not have new sore throat.  Patient does not have a fever greater than 99.5.

## 2022-12-28 NOTE — PLAN OF CARE
Goal Outcome Evaluation:    VS: /83 (BP Location: Right arm, Patient Position: Supine)   Pulse 81   Temp 97.2  F (36.2  C) (Oral)   Resp 18   SpO2 91%    O2: RA   Output: Urinal; staff empties, good output   Last BM: 12/27 bedpan   Activity: Participated in therapy; went to ID appt  LLE NWB   Skin: X; Left hip incision CDI steri strips  Groin, pannus moisture   Pain: HA; Tylenol partially effective   CMS Neuro: Intact  A&O x3   Dressing:    Diet: Reg, no appetite; int nausea  Zofran x1     LDA: Left PIV   Equipment: [employer] recliner ordered   Plan: Con't POC.    Additional Info:          Patient's most recent vital signs are:     Vital signs:  BP: 138/83  Temp: 97.2  HR: 81  RR: 18  SpO2: 91 %     Patient does not have new respiratory symptoms.  Patient does not have new sore throat.  Patient does not have a fever greater than 99.5.

## 2022-12-28 NOTE — CARE PLAN
Problem: Range of Motion Impairment  Goal: Optimal Range of Motion  Outcome: Progressing     Problem: Skin Injury Risk Increased  Goal: Skin Health and Integrity  Description: Perform a full pressure injury risk assessment, as indicated by screening, upon admission to care unit.    Reassess skin (full inspection and injury risk, including skin temperature, consistency and color) frequently (e.g., scheduled interval, with change in condition) to provide optimal early detection and prevention.    Maintain adequate tissue perfusion (e.g., encourage fluid balance; avoid crossing legs, constrictive clothing or devices) to promote tissue oxygenation.    Maintain head of bed at lowest degree of elevation tolerated, considering medical condition and other restrictions. Use positioning supports to prevent sliding and friction. Consider low friction textiles    Outcome: Progressing     Problem: Hip Arthroplasty  Goal: Absence of Infection Signs and Symptoms  Description: Optimize activity and mobility to maximize infection resistance (e.g., reposition, sit in chair, ambulate).    Maintain normothermia and glycemic control to maximize infection resistance.    Maintain dressing and closed drainage system integrity to reduce the risk for infection; inspect incision as visible.    Discontinue prophylactic antimicrobial agent within 24 hours after procedure, as directed.    Implement transmission-based precautions and isolation, as indicated, to prevent spread of infection.    Identify early signs of sepsis, such as increased heart rate and decreased blood pressure, as well as changes in mental state, respiratory pattern or peripheral perfusion.    Prepare for rapid sepsis management, including lactate level, intravenous access, fluid administration and oxygen therapy.    Provide fever-reduction and comfort measures.    Outcome: Progressing     Problem: Fall Injury Risk  Goal: Absence of Fall and Fall-Related  Injury  Description: Provide a safe, barrier-free environment that encourages independent activity.    Keep care area uncluttered and well-lighted.    Determine need for increased observation or monitoring.    Avoid use of devices that minimize mobility, such as restraints or indwelling urinary catheter.      Outcome: Progressing     Problem: Infection  Goal: Absence of Infection Signs and Symptoms  Description: Implement transmission-based precautions and isolation, as indicated, to prevent spread of infection.    Obtain cultures prior to initiating antimicrobial therapy, when possible. Do not delay treatment for laboratory results in the presence of high suspicion or clinical indicators.    Administer ordered antimicrobial therapy promptly; reassess need regularly.    Monitor laboratory value, diagnostic test and clinical status trends for signs of infection progression.    Identify early signs of sepsis, such as increased heart rate and decreased blood pressure, as well as changes in mental state, respiratory pattern or peripheral perfusion.    Outcome: Progressing     Problem: BADL (Basic Activities of Daily Living) Impairment  Goal: Optimal Safe BADL Performance  Description: Assess BADL (basic activity of daily living) abilities; encourage participation at maximally safe independent level.    Provide assistance and supervision needed to maintain safety; involve caregiver in BADL (basic activity of daily living) training.    Ensure effective use of equipment or devices, such as a long-handled reacher, shower seat or orthosis.    Ensure proper body mechanics and positioning for optimal task performance.    Provide set-up of items if patient is unable to retrieve; store personal care items in accessible location.    Schedule BADL (basic activity of daily living) activities when pain and fatigue are at a minimum; pace activity to conserve energy.        Outcome: Progressing     Problem: Oral Intake  Inadequate  Goal: Improved Oral Intake  Description: Perform a nutrition assessment that includes a nutrition-focused physical exam; identify malnutrition risk.    Assess for adequate oral intake; if inadequate, offer oral supplemental food or drinks to enhance calorie and protein intake.    Assess for vitamin and mineral deficiencies; supplement if depleted.    Assess need and assist with with meal set-up and feeding.    Adjust diet or meal schedule based on preferences and tolerance.    Minimize unnecessary dietary restrictions to increase oral intake.    Outcome: Progressing     Problem: Diarrhea  Goal: Effective Diarrhea Management  Description: Provide fluid and electrolyte replacement to correct any imbalance through use of oral rehydration solution, nasogastric tube or intravenous fluid therapy.    Utilize skin protectant barrier to maintain skin integrity; cleanse gently, thoroughly and promptly after stooling; avoid alcohol-containing wipes.    Continue usual diet; encourage fluids (e.g., broth, soups, fruit juices).    Keep toilet, toileting devices and aids readily accessible and barrier-free to maintain safety.    Provide comfort measures and privacy.    Outcome: Progressing     Problem: Urinary Incontinence  Goal: Effective Urinary Elimination  Description: Identify cause and contributing factors, such as disease process, medication or treatment side effects, infection or dietary bladder irritants; provide treatment.    Assess for general symptoms, such as fatigue, depression, weakness, confusion, sensory alterations that could impact toileting.    Provide safe and ready access to toileting devices and aids (e.g., commode, urinal).    Promote behavioral methods, such as prompted toileting, elimination schedule, relaxation techniques or voiding posture to facilitate flow.    Consider pelvic floor muscle strengthening, such as pelvic floor muscle training, vaginal cone, bladder support or  neuromodulation.    Ensure bladder emptying (e.g., intermittent catheterization, portable bladder ultrasound after voiding).    Outcome: Progressing     Problem: Nausea and Vomiting  Goal: Nausea and Vomiting Relief  Description: Adjust position to prevent aspiration.    Identify and address underlying cause.    Monitor intake, output and laboratory value trends; advocate for adjustment in treatment with imbalance.    Evaluate medication (addition, withdrawal, toxicity) as potential source or trigger, such as an opioid agent.    Administer antiemetic agent per prescribed regimen.    Assess fluid status and ability to take oral fluids; if unable to provide or achieve oral intake, provide intravenous fluid therapy and electrolyte replacement.    Minimize sight, smell and taste of foods or odors that trigger nausea.    Outcome: Progressing   Goal Outcome Evaluation: ongoing

## 2022-12-28 NOTE — NURSING NOTE
Chief Complaint   Patient presents with     [facility] f/u for prosthetic joint infection     Blood pressure 102/64, pulse 85, temperature 97.3  F (36.3  C), temperature source Oral, SpO2 92 %.    Ag Sparks on 12/28/2022 at 2:12 PM

## 2022-12-29 NOTE — PLAN OF CARE
Pt is alert and oriented x4. Able to make needs known. Denies chest pain, light headedness, dizziness or SOB. Assist of 2 w/ lift transfer. Continent of bowel and bladder. LBM today. Recliner arrived today. Pt did not want to sit on it after he was done with therapy, preferred to be transferred to bed. Requested for PRN tums x1. No new concerns, call light is in reach, continue w/ POC.

## 2022-12-29 NOTE — PROGRESS NOTES
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 Hospital stay was c/b acute encephalopathy (attributed to anesthesia and sedating meds), acute hypoxic respiratory failure (attributed to obesity hypoventilation syndrome), acute blood loss anemia, HALEY (thought pre renal), and profound physical deconditioning. Transferred to TCU for ongoing rehabilitation.      #Chronic left hip PJI s/p explantation, debridement, and reconstruction with antibiotic spacer (11/22/22) - Dr. Meyers. Surgical cultures grew Finegoldia magna and Proteus mirabilis (also had C. acnes in the past but doing well despite no directed treatment of this). Has been on PO Cipro and Flagyl per ID w/ plan for 6 wk course - was seen in clinic 12/28 w/o change to plan, noted potential higher risk for treatment failure given comorbidities, sinus tract, and history of PJI. WBC and CRP have normalized. Pain is controlled but extremely limited in mobility (requiring lift).   - NWB LLE. PT/OT.   - Continue PO Cipro and Flagyl through 1/3, then stop (ok per ID at 12/28 clinic visit).   - Sees Dr. Meyers on 1/5 for planning of next steps. Anticipate antibiotic holiday before considering future reimplantation.   - Continue PRN Tylenol, rare oxycodone use.     #Intermittent nausea - Likely d/t metronidazole. Zofran not very helpful.   - Can trial Compazine, but ultimately anticipate improvement once off abx as he had no GI complaints prior to this course.      #Acute on chronic normocytic anemia with acute blood loss -  Baseline hgb ~10. Hgb down to  6.3 post-op. Received total of 4 units PRBCs in hospital. Hgb improved to 10.8.   - Once weekly CBC for now.      #DM2 -  Well controlled at baseline. HgbA1C 6.8% on 11/23. PTA regimen includes glipizide, pioglitazone, metformin, and Trulicity. BG controlled.   - Continue home metformin and Actos.   - On Victoza here as Trulicity is non formulary.   - Home glipizide remains on hold; would currently be at risk for hypoglycemia as PO intake is below baseline w/ above nausea.      #JAIR - Continue home CPAP.     #Hx DVT/PE -  PTA apixaban held prior to surgery. Started on ASA immediately post-op, then transitioned to apixaban 2.5mg BID x 2 weeks, then restarted full therapeutic dose.   - Continue apixaban 5mg BID.     #Venous insufficiency -  Chronic lower extremity edema managed with Lasix. Diuretics held post-op in setting of HALEY. Restarted without issue. No significant edema on exam today.  - Continue Lasix 20 mg daily.    CODE: full  DVT: apixaban  Diet: regular  Indications for Psychotropic Medications: N/A  Disposition: PT 1/24 and OT 2/7; is still requiring lift for transfers. Will meet with Dr. Meyers on 1/5 to determine next steps (suspect he will be looking at a 2 month antibiotic holiday before hip can be reimplanted). Has 7 steps so therapy is going to recommend [facility] as patient/significant other have not been interested in purchasing a chair lift or setting up home for first floor bed/commode.     Liliya Lai PA-C  Hospitalist Service  Pager: [phone]  ________________________________________________________________    Subjective & Interval History:  Don is doing ok. Is very limited in what he can do with his left hip as it feels unstable or unnatural. Pain is controlled at rest with minimal medication use, increases with use. No f/c. He experiences nausea that he is attributing to the metronidazole. Sometimes will take a Tums or Zofran; neither are that helpful. BMs are on the loose side at times, but  nausea feels better after having BM. Appetite is decreased. No vomiting. No reflux symptoms.     Last 24 hour care team notes reviewed.   ROS: 4 point ROS (including Respiratory, CV, GI and ) was performed and negative unless otherwise noted in HPI.     Medications: Reviewed in EPIC.    Physical Exam:    Blood pressure 134/79, pulse 93, temperature 97  F (36.1  C), temperature source Oral, resp. rate 18, SpO2 94 %.    GENERAL: Alert and oriented x 3. Lying in bed, appears comfortable. Pleasant and conversant.   HEENT: Anicteric sclera. Mucous membranes moist.   CV: RRR. S1, S2. No murmurs appreciated.   RESPIRATORY: Effort normal on room air. Lungs CTAB with no wheezing, rales, rhonchi.   GI: Abdomen obese, soft, non tender.   NEUROLOGICAL: No focal deficits. Moves all extremities.    EXTREMITIES: Trace BLE peripheral edema.   SKIN: No jaundice. No rashes. BLE venous stasis changes.     Lines/Tubes/Drains: none

## 2022-12-29 NOTE — PLAN OF CARE
Goal Outcome Evaluation:  Patient had ID consult today ( see notes), currently on 5 week course of ABX ( Cipro and Metronidazole) with plan to continue until next week to complete 6 weeks course ( end date: 01/04/23 per patient) . No acute issues , no recent changes, no new orders placed at this time.   All V/S stable, no complaint of pain. Refusing turning/ repositioning,sling and  lift device  Used for incontinence cares. Mentioned per report in AM about wanting to sit up on chair, recliner order placed also in AM.     BG:: 117,160    Patient's most recent vital signs are:     Vital signs:  BP: 132/66  Temp: 97.8  HR: 86  RR: 18  SpO2: 94 %     Patient does not have new respiratory symptoms.  Patient does not have new sore throat.  Patient does not have a fever greater than 99.5.

## 2022-12-29 NOTE — PLAN OF CARE
Goal Outcome Evaluation:     Plan of Care Reviewed With: patient    Overall Patient Progress: no change    Pt has no c/o pain or discomfort this shift  Using urinal in bed, staff empties. Pt has no c/o SOB and no s/s of respiratory issue noted at RA.  Appear to be sleeping/resting, snoring during rounds. Continue with plan of care.    Patient's most recent vital signs are:     Vital signs:  BP: 132/66  Temp: 97.8  HR: 86  RR: 18  SpO2: 94 %     Patient does not have new respiratory symptoms.  Patient does not have new sore throat.  Patient does not have a fever greater than 99.5.

## 2022-12-30 NOTE — PROGRESS NOTES
CLINICAL NUTRITION SERVICES - ASSESSMENT NOTE     Nutrition Prescription    RECOMMENDATIONS FOR MDs/PROVIDERS TO ORDER:  Reviewed bowel medications with rounding Provider, noted not being given, but Provider may adjust orders to PRN     Malnutrition Status:    Moderate malnutrition in the context of acute illness or injury     Recommendations already ordered by Registered Dietitian (RD):  Will place order for Banatrol once daily - have pt trial and if accepting may increase to BID  Will place order to have pt also try supplements daily     Future/Additional Recommendations:  Monitor tolerance to Banatrol and other supplements offered, adjust orders as appropriate  Monitor weight/lab trends      REASON FOR ASSESSMENT  Waldo Bustos is a/an 72 year old male assessed by the dietitian for LOS    NUTRITION/MEDICAL HISTORY  Per chart review: Pt admits to TCU for rehabilitation in the setting of post hospitalization for scheduled explantation of left hip prosthesis, debridement, and reconstruction with antibiotic cement spacer on 11/22/22, course was complicated by acute encephalopathy, acute hypoxic respiratory failure, acute blood loss anemia, HALEY, and profound physical deconditioning. Other past medical history noted for morbid obesity, DM2, HLD, JAIR, PE/DVT, venous insufficiency, osteoarthritis, and chronic L hip prosthetic joint infection.    Per pt visit: RD visited pt at bedside, reviewed intakes and weight trends, pt reports due to multiple factors (loose stool/upset stomach, decreased appetite, and dislike of some foods), pt only taking 1 meal per day. Reviewed weight trends below and current weight obtained, pt is unsure if has actually lost that much weight, in hospital gown and had not been in home clothes to assess fit changes, pt has likely lost some weight, but unsure of bed scale at this time, will have nursing attempt re-weigh. RD reviewed concern for poor intakes and discussed interventions, discussed  "interventions for both for loose stool and supplementation for calories/protein, pt very hesitant to try any supplements, but after encouragement, agreeing to trying supplements once daily as ordered above. Suggest pt have  come and take pt's meal orders for more consistency and to encourage intakes, pt declined this at this time. Pt did have outside foods in room, encouraged pt to take snacks provided by family.     Spoke with rounding Provider regarding pt's complaints of loose stools as noted above.     CURRENT NUTRITION ORDERS  Diet: Regular  Intake/Tolerance: 50-75% per flow sheets     LABS  Labs reviewed    MEDICATIONS  PRN Oxycodone, Victoza, Metformin, Actos, Culturelle, Lipitor, Lasix, Cipro, Miralax, Senna-docusate, Flagyl    ANTHROPOMETRICS  Ht Readings from Last 1 Encounters:   11/22/22 1.778 m (5' 10\")     Wt Readings from Last 20 Encounters:   12/30/22 118.5 kg (261 lb 3.2 oz)   11/22/22 147.1 kg (324 lb 4.8 oz)    11/14/22 142.9 kg (315 lb)   11/09/22 146.8 kg (323 lb 9.6 oz)   05/19/22 136.1 kg (300 lb)     BMI Readings from Last 1 Encounters:   12/30/22 37.48 kg/m      UBW: Pt reports around 315 lbs prior to hospitalization   IBW: 75.4 kg  BMI: Obesity Grade II BMI 35-39.9  Weight assessment: Difficult to fully assess trends as question bed scale weight accuracy, nursing staff will attempt re-kranthi    Dosing Weight: 93.6 kg - adjusted BW    ASSESSED NUTRITION NEEDS  Estimated Energy Needs: 3322-3772 kcals/day (20 - 25 kcals/kg)  Justification: Obese  Estimated Protein Needs:  grams protein/day (1 - 1.2 grams of pro/kg)  Justification: Maintenance vs post op   Estimated Fluid Needs: 1 mL/kcal  Justification: Maintenance    PHYSICAL FINDINGS  Pt with multiple missing and broken teeth, missing bridges per pt report and visualization by RD, pt denies having any chewing difficulty due to poor dentition. Pt also reports irritation to buttocks from loose stools, redness noted per nursing " documentation.    MALNUTRITION  % Intake: < 75% for > 7 days (moderate)  % Weight Loss: Difficult to assess due to questionable trend   Subcutaneous Fat Loss: None observed  Muscle Loss: Upper leg (quadricep, hamstring): mild   Fluid Accumulation/Edema: None noted  Malnutrition Diagnosis: Moderate malnutrition in the context of acute illness or injury     NUTRITION DIAGNOSIS  Inadequate oral intake related to multifactorial (altered GI function, decreased appetite, dislike of food) as evidenced by pt reports      INTERVENTIONS  Implementation  Nutrition Education: RD reviewed the importance of adequate nutritional intakes for healing and continued work with therapy    Medical food supplement therapy - ordered as above      Goals  Patient to consume % of nutritionally adequate meal trays TID, or the equivalent with supplements/snacks.     Monitoring/Evaluation  Progress toward goals will be monitored and evaluated per protocol.    Alida Crowell RD, CNSC, LD  TCU RD pager: [phone]

## 2022-12-30 NOTE — PLAN OF CARE
Pt A&OX4, calm, pleasant, & cooperative with care. Denied CP, SOB, & n/v. Pt is A of 2 with liko lift for transfer. Continent for both B&B. Had an episode of bowel incontinence X1. Takes med whole with thin liquid. L forearm PIV patent & dressing CDI. Pt slept well throughout the night. Pt is able to make needs known & call light within reach. Will continue with plan of care.    Patient's most recent vital signs are:     Vital signs:  BP: 131/64  Temp: 96.9  HR: 84  RR: 20  SpO2: 95 %     Patient does not have new respiratory symptoms.  Patient does not have new sore throat.  Patient does not have a fever greater than 99.5.

## 2022-12-30 NOTE — PLAN OF CARE
Goal Outcome Evaluation:      Plan of Care Reviewed With: patient    Overall Patient Progress: no changeOverall Patient Progress: no change    Outcome Evaluation: RD assessed pt for LOS, started banatrol for reports of loose stool and supplement interventions for poor po. See RD progress note for full assessment details.

## 2022-12-30 NOTE — PLAN OF CARE
Pt A&OX4, calm, pleasant, & cooperative with care. Denied CP, SOB, & n/v. Pt is A of 2 with liko lift for transfer; was not OOB this shift. Continent for both B&B. Takes med whole with thin liquid. L forearm PIV patent & dressing CDI. Pt had a visitor this evening. Pt is able to make needs known & call light within reach. Will continue with plan of care.    Patient's most recent vital signs are:     Vital signs:  BP: 131/64  Temp: 96.9  HR: 84  RR: 20  SpO2: 95 %     Patient does not have new respiratory symptoms.  Patient does not have new sore throat.  Patient does not have a fever greater than 99.5.

## 2022-12-30 NOTE — PLAN OF CARE
Patient is alert and oriented  x 4.  Able to communicate needs. VSS on RA. Denies any cough, chest pain, SOB, dificulty breathing, lightheadedness or dizziness. No nausea or vomiting. Denies any chills or fever. Regular diet, thin liquids. Meds whole with water, except Flgyl with apple sauce. Contoinent of bowel & bladder. Left forearm PIV line intact. Pain comfortably manageable with PRN oxycodone. Calls appropriately for help. Call light in reach.     Patient's most recent vital signs are:     Vital signs:  BP: 126/69  Temp: 97.2  HR: 88  RR: 20  SpO2: 92 %     Patient does not have new respiratory symptoms.  Patient does not have new sore throat.  Patient does not have a fever greater than 99.5.

## 2022-12-31 NOTE — PLAN OF CARE
Blood pressure 124/69, pulse 89, temperature (!) 96.4  F (35.8  C), temperature source Oral, resp. rate 18, weight 118.5 kg (261 lb 3.2 oz), SpO2 90 %.     Pt is AOX4. On room air. Regular diet, thin liquids. Transfers using lift.   Pt complained of left hip pain and neck pain. Pain managed with Tylenol and Oxycodone. Pt stated that pain medication did not help. Provider increased dose amount of Tylenol from 650 to 1000 mg.   Pt had nausea and vomited this AM. Writer administered zofran. Pt stated that it helped.   Denied CP, numbness, tingling.     Has L SL PIV.     Continue POC.

## 2022-12-31 NOTE — CARE PLAN
[facility] CARE TELEHEALTH DISQUALIFIED PROGRESS NOTE    Upon review of the patient's responses to the questionnaire, reason for visit and a review of the medical record, this visit has been disqualified and cannot be evaluated as part of our [facility] Care Telehealth visits.     Rationale for Disqualification: Patient requires a face to face visit.    The patient will be contacted to notify the patient of my disposition recommendation.    Patient was already seen via VV on 10/6 and treated, if symptoms are persistent/changing/worsening an in person evaluation is recommended.    Pt A&O x4, able to make his needs known. Had an uneventful night. Continent of B&B. Pt transfer using the liko lift with an assist of two. Has a L forearm PIV. Denies SOB and CP.  Call light is with in reach.Continue POC.            Patient's most recent vital signs are:     Vital signs:  BP: 138/63  Temp: 95.8  HR: 84  RR: 20  SpO2: 96 %     Patient does not have new respiratory symptoms.  Patient does not have new sore throat.  Patient does not have a fever greater than 99.5.

## 2022-12-31 NOTE — PLAN OF CARE
"  Problem: Plan of Care - These are the overarching goals to be used throughout the patient stay.    Goal: Plan of Care Review  Description: The Plan of Care Review/Shift note should be completed every shift.  The Outcome Evaluation is a brief statement about your assessment that the patient is improving, declining, or no change.  This information will be displayed automatically on your shift note.  Outcome: Progressing  Flowsheets (Taken 12/30/2022 1852)  Plan of Care Reviewed With: patient     Problem: Plan of Care - These are the overarching goals to be used throughout the patient stay.    Goal: Patient-Specific Goal (Individualized)  Description: You can add care plan individualizations to a care plan. Examples of Individualization might be:  \"Parent requests to be called daily at 9am for status\", \"I have a hard time hearing out of my right ear\", or \"Do not touch me to wake me up as it startles me\".  Outcome: Progressing     Problem: Plan of Care - These are the overarching goals to be used throughout the patient stay.    Goal: Absence of Hospital-Acquired Illness or Injury  Outcome: Progressing  Intervention: Identify and Manage Fall Risk  Recent Flowsheet Documentation  Taken 12/30/2022 1800 by Juaquin Gillespie RN  Safety Promotion/Fall Prevention:   activity supervised   assistive device/personal items within reach  Intervention: Prevent Skin Injury  Recent Flowsheet Documentation  Taken 12/30/2022 1800 by Juaquin Gillespie RN  Body Position: position changed independently  Intervention: Prevent and Manage VTE (Venous Thromboembolism) Risk  Recent Flowsheet Documentation  Taken 12/30/2022 1800 by Juaquin Gillespie RN  VTE Prevention/Management: other (see comments)     Problem: Plan of Care - These are the overarching goals to be used throughout the patient stay.    Goal: Absence of Hospital-Acquired Illness or Injury  Intervention: Identify and Manage Fall Risk  Recent Flowsheet Documentation  Taken 12/30/2022 " 1800 by Juaquin Gillespie RN  Safety Promotion/Fall Prevention:   activity supervised   assistive device/personal items within reach     Problem: Plan of Care - These are the overarching goals to be used throughout the patient stay.    Goal: Absence of Hospital-Acquired Illness or Injury  Intervention: Prevent Skin Injury  Recent Flowsheet Documentation  Taken 12/30/2022 1800 by Juaquin Gillespie RN  Body Position: position changed independently     Problem: Plan of Care - These are the overarching goals to be used throughout the patient stay.    Goal: Absence of Hospital-Acquired Illness or Injury  Intervention: Prevent and Manage VTE (Venous Thromboembolism) Risk  Recent Flowsheet Documentation  Taken 12/30/2022 1800 by Juaquin Gillespie RN  VTE Prevention/Management: other (see comments)     Problem: Plan of Care - These are the overarching goals to be used throughout the patient stay.    Goal: Optimal Comfort and Wellbeing  Outcome: Progressing     Problem: Plan of Care - These are the overarching goals to be used throughout the patient stay.    Goal: Readiness for Transition of Care  Outcome: Progressing  Flowsheets (Taken 12/30/2022 1852)  Anticipated Changes Related to Illness: inability to care for self  Transportation Anticipated: family or friend will provide  Concerns to be Addressed: discharge planning  Intervention: Mutually Develop Transition Plan  Recent Flowsheet Documentation  Taken 12/30/2022 1852 by Juaquin Gillespie RN  Anticipated Changes Related to Illness: inability to care for self  Transportation Anticipated: family or friend will provide  Concerns to be Addressed: discharge planning     Problem: Plan of Care - These are the overarching goals to be used throughout the patient stay.    Goal: Readiness for Transition of Care  Intervention: Mutually Develop Transition Plan  Recent Flowsheet Documentation  Taken 12/30/2022 1852 by Juaquin Gillespie RN  Anticipated Changes Related to Illness:  inability to care for self  Transportation Anticipated: family or friend will provide  Concerns to be Addressed: discharge planning     Problem: TCU Patient Goals  Goal: TCU Plan of Care  Outcome: Progressing  Flowsheets (Taken 12/30/2022 1852)  Bowel: continent  Bladder: continent  Patient is on a psychotropic medication, compazine and/or reglan: no  Skin Integrity: repositioning     Problem: Hip Arthroplasty  Goal: Optimal Coping  Outcome: Progressing     Problem: Hip Arthroplasty  Goal: Effective Bowel Elimination  Outcome: Progressing     Problem: Hip Arthroplasty  Goal: Acceptable Pain Control  Outcome: Progressing     Problem: BADL (Basic Activities of Daily Living) Impairment  Goal: Optimal Safe BADL Performance  Description: Assess BADL (basic activity of daily living) abilities; encourage participation at maximally safe independent level.    Provide assistance and supervision needed to maintain safety; involve caregiver in BADL (basic activity of daily living) training.    Ensure effective use of equipment or devices, such as a long-handled reacher, shower seat or orthosis.    Ensure proper body mechanics and positioning for optimal task performance.    Provide set-up of items if patient is unable to retrieve; store personal care items in accessible location.    Schedule BADL (basic activity of daily living) activities when pain and fatigue are at a minimum; pace activity to conserve energy.        Outcome: Progressing     Problem: Skin Injury Risk Increased  Goal: Skin Health and Integrity  Description: Perform a full pressure injury risk assessment, as indicated by screening, upon admission to care unit.    Reassess skin (full inspection and injury risk, including skin temperature, consistency and color) frequently (e.g., scheduled interval, with change in condition) to provide optimal early detection and prevention.    Maintain adequate tissue perfusion (e.g., encourage fluid balance; avoid crossing  legs, constrictive clothing or devices) to promote tissue oxygenation.    Maintain head of bed at lowest degree of elevation tolerated, considering medical condition and other restrictions. Use positioning supports to prevent sliding and friction. Consider low friction textiles    Intervention: Optimize Skin Protection  Recent Flowsheet Documentation  Taken 12/30/2022 1800 by Juaquin Gillespie RN  Activity Management: activity adjusted per tolerance  Head of Bed (HOB) Positioning: HOB at 20-30 degrees     Problem: Hip Arthroplasty  Goal: Optimal Functional Ability  Intervention: Promote Optimal Functional Status  Recent Flowsheet Documentation  Taken 12/30/2022 1800 by Juaquin Gillespie RN  Assistive Device Utilized: lift device  Activity Management: activity adjusted per tolerance     Problem: Fall Injury Risk  Goal: Absence of Fall and Fall-Related Injury  Description: Provide a safe, barrier-free environment that encourages independent activity.    Keep care area uncluttered and well-lighted.    Determine need for increased observation or monitoring.    Avoid use of devices that minimize mobility, such as restraints or indwelling urinary catheter.      Intervention: Promote Injury-Free Environment  Recent Flowsheet Documentation  Taken 12/30/2022 1800 by Juaquin Gillespie RN  Safety Promotion/Fall Prevention:   activity supervised   assistive device/personal items within reach   Goal Outcome Evaluation:      Plan of Care Reviewed With: patient      Denies having chest pain,SOB,chills and headache. Vital signs normal and within normal limits. Continue with POC  /63 (BP Location: Right arm)   Pulse 84   Temp (!) 95.8  F (35.4  C) (Oral)   Resp 20   Wt 118.5 kg (261 lb 3.2 oz)   SpO2 96%   BMI 37.48 kg/m    Iso:  No active isolations  Diet: Regular Diet Adult  Snacks/Supplements Adult: Other; Send one time dose of Banatrol today 12/30, send Chocolate or Strawberry Ensure at 10 am on 12/31, Send Gelatein at  10 am on 1/1; Between Meals  Mental Status:   Alert and oriented.  Main Acuity Score: 152  O2:     Mg+:    K:     PLT: 365 (12/28 0615)  HGB: 10.8 (12/28 0615)  BS: 107 (12/30 084

## 2022-12-31 NOTE — PLAN OF CARE
[id number]:    Pt A&O x4. Lift. Declined PRN Oxy when offered. BG was 108. Continue w/ plan of care.

## 2023-01-01 ENCOUNTER — APPOINTMENT (OUTPATIENT)
Dept: PHYSICAL THERAPY | Facility: SKILLED NURSING FACILITY | Age: 73
DRG: 559 | End: 2023-01-01
Attending: INTERNAL MEDICINE
Payer: COMMERCIAL

## 2023-01-01 ENCOUNTER — APPOINTMENT (OUTPATIENT)
Dept: CT IMAGING | Facility: CLINIC | Age: 73
DRG: 856 | End: 2023-01-01
Attending: INTERNAL MEDICINE
Payer: COMMERCIAL

## 2023-01-01 ENCOUNTER — APPOINTMENT (OUTPATIENT)
Dept: LAB | Facility: CLINIC | Age: 73
End: 2023-01-01
Payer: COMMERCIAL

## 2023-01-01 ENCOUNTER — APPOINTMENT (OUTPATIENT)
Dept: GENERAL RADIOLOGY | Facility: CLINIC | Age: 73
DRG: 856 | End: 2023-01-01
Attending: STUDENT IN AN ORGANIZED HEALTH CARE EDUCATION/TRAINING PROGRAM
Payer: COMMERCIAL

## 2023-01-01 ENCOUNTER — OFFICE VISIT (OUTPATIENT)
Dept: ORTHOPEDICS | Facility: CLINIC | Age: 73
End: 2023-01-01
Payer: COMMERCIAL

## 2023-01-01 ENCOUNTER — TELEPHONE (OUTPATIENT)
Dept: ORTHOPEDICS | Facility: CLINIC | Age: 73
End: 2023-01-01
Payer: COMMERCIAL

## 2023-01-01 ENCOUNTER — APPOINTMENT (OUTPATIENT)
Dept: OCCUPATIONAL THERAPY | Facility: CLINIC | Age: 73
DRG: 856 | End: 2023-01-01
Attending: INTERNAL MEDICINE
Payer: COMMERCIAL

## 2023-01-01 ENCOUNTER — ANCILLARY PROCEDURE (OUTPATIENT)
Dept: NEUROLOGY | Facility: CLINIC | Age: 73
DRG: 856 | End: 2023-01-01
Attending: PSYCHIATRY & NEUROLOGY
Payer: COMMERCIAL

## 2023-01-01 ENCOUNTER — APPOINTMENT (OUTPATIENT)
Dept: OCCUPATIONAL THERAPY | Facility: SKILLED NURSING FACILITY | Age: 73
DRG: 559 | End: 2023-01-01
Attending: INTERNAL MEDICINE
Payer: COMMERCIAL

## 2023-01-01 ENCOUNTER — APPOINTMENT (OUTPATIENT)
Dept: GENERAL RADIOLOGY | Facility: CLINIC | Age: 73
End: 2023-01-01
Attending: ORTHOPAEDIC SURGERY
Payer: COMMERCIAL

## 2023-01-01 ENCOUNTER — TELEPHONE (OUTPATIENT)
Dept: RADIOLOGY | Facility: CLINIC | Age: 73
End: 2023-01-01
Payer: COMMERCIAL

## 2023-01-01 ENCOUNTER — ANESTHESIA (OUTPATIENT)
Dept: SURGERY | Facility: CLINIC | Age: 73
End: 2023-01-01
Payer: COMMERCIAL

## 2023-01-01 ENCOUNTER — APPOINTMENT (OUTPATIENT)
Dept: GENERAL RADIOLOGY | Facility: CLINIC | Age: 73
End: 2023-01-01
Attending: NURSE PRACTITIONER
Payer: COMMERCIAL

## 2023-01-01 ENCOUNTER — PREP FOR PROCEDURE (OUTPATIENT)
Dept: OTHER | Facility: CLINIC | Age: 73
End: 2023-01-01
Payer: COMMERCIAL

## 2023-01-01 ENCOUNTER — APPOINTMENT (OUTPATIENT)
Dept: CT IMAGING | Facility: CLINIC | Age: 73
DRG: 856 | End: 2023-01-01
Attending: STUDENT IN AN ORGANIZED HEALTH CARE EDUCATION/TRAINING PROGRAM
Payer: COMMERCIAL

## 2023-01-01 ENCOUNTER — ANCILLARY PROCEDURE (OUTPATIENT)
Dept: GENERAL RADIOLOGY | Facility: CLINIC | Age: 73
End: 2023-01-01
Attending: PHYSICIAN ASSISTANT
Payer: COMMERCIAL

## 2023-01-01 ENCOUNTER — APPOINTMENT (OUTPATIENT)
Dept: INTERVENTIONAL RADIOLOGY/VASCULAR | Facility: CLINIC | Age: 73
DRG: 856 | End: 2023-01-01
Attending: PHYSICIAN ASSISTANT
Payer: COMMERCIAL

## 2023-01-01 ENCOUNTER — APPOINTMENT (OUTPATIENT)
Dept: LAB | Facility: CLINIC | Age: 73
End: 2023-01-01
Attending: INTERNAL MEDICINE
Payer: COMMERCIAL

## 2023-01-01 ENCOUNTER — APPOINTMENT (OUTPATIENT)
Dept: PHYSICAL THERAPY | Facility: CLINIC | Age: 73
DRG: 466 | End: 2023-01-01
Attending: ORTHOPAEDIC SURGERY
Payer: COMMERCIAL

## 2023-01-01 ENCOUNTER — ANESTHESIA EVENT (OUTPATIENT)
Dept: SURGERY | Facility: CLINIC | Age: 73
DRG: 856 | End: 2023-01-01
Payer: COMMERCIAL

## 2023-01-01 ENCOUNTER — APPOINTMENT (OUTPATIENT)
Dept: CT IMAGING | Facility: CLINIC | Age: 73
DRG: 856 | End: 2023-01-01
Attending: PHYSICIAN ASSISTANT
Payer: COMMERCIAL

## 2023-01-01 ENCOUNTER — PREP FOR PROCEDURE (OUTPATIENT)
Dept: ORTHOPEDICS | Facility: CLINIC | Age: 73
End: 2023-01-01

## 2023-01-01 ENCOUNTER — ANESTHESIA (OUTPATIENT)
Dept: SURGERY | Facility: CLINIC | Age: 73
DRG: 856 | End: 2023-01-01
Payer: COMMERCIAL

## 2023-01-01 ENCOUNTER — APPOINTMENT (OUTPATIENT)
Dept: GENERAL RADIOLOGY | Facility: CLINIC | Age: 73
DRG: 856 | End: 2023-01-01
Attending: PHYSICIAN ASSISTANT
Payer: COMMERCIAL

## 2023-01-01 ENCOUNTER — APPOINTMENT (OUTPATIENT)
Dept: PHYSICAL THERAPY | Facility: CLINIC | Age: 73
DRG: 856 | End: 2023-01-01
Attending: INTERNAL MEDICINE
Payer: COMMERCIAL

## 2023-01-01 ENCOUNTER — APPOINTMENT (OUTPATIENT)
Dept: OCCUPATIONAL THERAPY | Facility: CLINIC | Age: 73
DRG: 466 | End: 2023-01-01
Attending: ORTHOPAEDIC SURGERY
Payer: COMMERCIAL

## 2023-01-01 ENCOUNTER — APPOINTMENT (OUTPATIENT)
Dept: GENERAL RADIOLOGY | Facility: CLINIC | Age: 73
DRG: 466 | End: 2023-01-01
Attending: ORTHOPAEDIC SURGERY
Payer: COMMERCIAL

## 2023-01-01 ENCOUNTER — TELEPHONE (OUTPATIENT)
Dept: ORTHOPEDICS | Facility: CLINIC | Age: 73
End: 2023-01-01

## 2023-01-01 ENCOUNTER — APPOINTMENT (OUTPATIENT)
Dept: INTERVENTIONAL RADIOLOGY/VASCULAR | Facility: CLINIC | Age: 73
End: 2023-01-01
Attending: PHYSICIAN ASSISTANT
Payer: COMMERCIAL

## 2023-01-01 ENCOUNTER — APPOINTMENT (OUTPATIENT)
Dept: MRI IMAGING | Facility: CLINIC | Age: 73
DRG: 856 | End: 2023-01-01
Attending: INTERNAL MEDICINE
Payer: COMMERCIAL

## 2023-01-01 ENCOUNTER — ANESTHESIA EVENT (OUTPATIENT)
Dept: SURGERY | Facility: CLINIC | Age: 73
End: 2023-01-01
Payer: COMMERCIAL

## 2023-01-01 ENCOUNTER — APPOINTMENT (OUTPATIENT)
Dept: LAB | Facility: CLINIC | Age: 73
DRG: 466 | End: 2023-01-01
Payer: COMMERCIAL

## 2023-01-01 ENCOUNTER — APPOINTMENT (OUTPATIENT)
Dept: GENERAL RADIOLOGY | Facility: CLINIC | Age: 73
DRG: 856 | End: 2023-01-01
Attending: INTERNAL MEDICINE
Payer: COMMERCIAL

## 2023-01-01 ENCOUNTER — HOSPITAL ENCOUNTER (INPATIENT)
Facility: SKILLED NURSING FACILITY | Age: 73
LOS: 29 days | Discharge: NURSING, REHAB OR LONG-TERM CARE | DRG: 559 | End: 2023-09-26
Attending: INTERNAL MEDICINE | Admitting: INTERNAL MEDICINE
Payer: COMMERCIAL

## 2023-01-01 ENCOUNTER — HOSPITAL ENCOUNTER (INPATIENT)
Facility: SKILLED NURSING FACILITY | Age: 73
LOS: 15 days | Discharge: ANOTHER HOSPITAL | DRG: 559 | End: 2023-06-17
Attending: INTERNAL MEDICINE | Admitting: INTERNAL MEDICINE
Payer: COMMERCIAL

## 2023-01-01 ENCOUNTER — HEALTH MAINTENANCE LETTER (OUTPATIENT)
Age: 73
End: 2023-01-01

## 2023-01-01 ENCOUNTER — APPOINTMENT (OUTPATIENT)
Dept: GENERAL RADIOLOGY | Facility: CLINIC | Age: 73
DRG: 856 | End: 2023-01-01
Payer: COMMERCIAL

## 2023-01-01 ENCOUNTER — APPOINTMENT (OUTPATIENT)
Dept: LAB | Facility: CLINIC | Age: 73
DRG: 856 | End: 2023-01-01
Payer: COMMERCIAL

## 2023-01-01 ENCOUNTER — MEDICAL CORRESPONDENCE (OUTPATIENT)
Dept: HEALTH INFORMATION MANAGEMENT | Facility: CLINIC | Age: 73
End: 2023-01-01

## 2023-01-01 ENCOUNTER — APPOINTMENT (OUTPATIENT)
Dept: PHYSICAL THERAPY | Facility: CLINIC | Age: 73
DRG: 856 | End: 2023-01-01
Attending: PHYSICIAN ASSISTANT
Payer: COMMERCIAL

## 2023-01-01 ENCOUNTER — APPOINTMENT (OUTPATIENT)
Dept: ULTRASOUND IMAGING | Facility: CLINIC | Age: 73
DRG: 856 | End: 2023-01-01
Attending: PHYSICIAN ASSISTANT
Payer: COMMERCIAL

## 2023-01-01 ENCOUNTER — APPOINTMENT (OUTPATIENT)
Dept: ULTRASOUND IMAGING | Facility: CLINIC | Age: 73
DRG: 856 | End: 2023-01-01
Attending: STUDENT IN AN ORGANIZED HEALTH CARE EDUCATION/TRAINING PROGRAM
Payer: COMMERCIAL

## 2023-01-01 ENCOUNTER — OFFICE VISIT (OUTPATIENT)
Dept: INFECTIOUS DISEASES | Facility: CLINIC | Age: 73
End: 2023-01-01
Attending: INTERNAL MEDICINE
Payer: COMMERCIAL

## 2023-01-01 ENCOUNTER — HOSPITAL ENCOUNTER (INPATIENT)
Facility: CLINIC | Age: 73
LOS: 7 days | Discharge: NURSING, REHAB OR LONG-TERM CARE | DRG: 466 | End: 2023-06-02
Attending: ORTHOPAEDIC SURGERY | Admitting: ORTHOPAEDIC SURGERY
Payer: COMMERCIAL

## 2023-01-01 ENCOUNTER — HOSPITAL ENCOUNTER (INPATIENT)
Facility: CLINIC | Age: 73
LOS: 72 days | Discharge: NURSING, REHAB OR LONG-TERM CARE | DRG: 856 | End: 2023-08-28
Attending: INTERNAL MEDICINE | Admitting: INTERNAL MEDICINE
Payer: COMMERCIAL

## 2023-01-01 ENCOUNTER — HOSPITAL ENCOUNTER (INPATIENT)
Facility: SKILLED NURSING FACILITY | Age: 73
LOS: 49 days | Discharge: ANOTHER HOSPITAL | DRG: 559 | End: 2023-05-26
Attending: INTERNAL MEDICINE | Admitting: INTERNAL MEDICINE
Payer: COMMERCIAL

## 2023-01-01 ENCOUNTER — ANESTHESIA (OUTPATIENT)
Dept: SURGERY | Facility: CLINIC | Age: 73
DRG: 466 | End: 2023-01-01
Payer: COMMERCIAL

## 2023-01-01 ENCOUNTER — APPOINTMENT (OUTPATIENT)
Dept: GENERAL RADIOLOGY | Facility: CLINIC | Age: 73
End: 2023-01-01
Payer: COMMERCIAL

## 2023-01-01 ENCOUNTER — APPOINTMENT (OUTPATIENT)
Dept: ULTRASOUND IMAGING | Facility: CLINIC | Age: 73
End: 2023-01-01
Payer: COMMERCIAL

## 2023-01-01 ENCOUNTER — APPOINTMENT (OUTPATIENT)
Dept: CARDIOLOGY | Facility: CLINIC | Age: 73
DRG: 856 | End: 2023-01-01
Payer: COMMERCIAL

## 2023-01-01 ENCOUNTER — HOSPITAL ENCOUNTER (OUTPATIENT)
Facility: CLINIC | Age: 73
Discharge: NURSING, REHAB OR LONG-TERM CARE | End: 2023-04-07
Attending: ORTHOPAEDIC SURGERY | Admitting: ORTHOPAEDIC SURGERY
Payer: COMMERCIAL

## 2023-01-01 ENCOUNTER — ANESTHESIA EVENT (OUTPATIENT)
Dept: SURGERY | Facility: CLINIC | Age: 73
DRG: 466 | End: 2023-01-01
Payer: COMMERCIAL

## 2023-01-01 ENCOUNTER — APPOINTMENT (OUTPATIENT)
Dept: CARDIOLOGY | Facility: CLINIC | Age: 73
DRG: 466 | End: 2023-01-01
Attending: INTERNAL MEDICINE
Payer: COMMERCIAL

## 2023-01-01 VITALS
BODY MASS INDEX: 34.83 KG/M2 | TEMPERATURE: 96.8 F | WEIGHT: 243.3 LBS | RESPIRATION RATE: 16 BRPM | HEIGHT: 70 IN | OXYGEN SATURATION: 94 % | HEART RATE: 73 BPM | SYSTOLIC BLOOD PRESSURE: 139 MMHG | DIASTOLIC BLOOD PRESSURE: 57 MMHG

## 2023-01-01 VITALS
RESPIRATION RATE: 18 BRPM | TEMPERATURE: 97.5 F | WEIGHT: 217.59 LBS | OXYGEN SATURATION: 95 % | SYSTOLIC BLOOD PRESSURE: 123 MMHG | HEIGHT: 70 IN | WEIGHT: 207.6 LBS | HEART RATE: 72 BPM | BODY MASS INDEX: 29.79 KG/M2 | TEMPERATURE: 98 F | DIASTOLIC BLOOD PRESSURE: 65 MMHG | HEART RATE: 67 BPM | OXYGEN SATURATION: 99 % | DIASTOLIC BLOOD PRESSURE: 59 MMHG | BODY MASS INDEX: 31.15 KG/M2 | SYSTOLIC BLOOD PRESSURE: 121 MMHG | RESPIRATION RATE: 16 BRPM

## 2023-01-01 VITALS
RESPIRATION RATE: 18 BRPM | HEART RATE: 71 BPM | BODY MASS INDEX: 37 KG/M2 | WEIGHT: 257.9 LBS | TEMPERATURE: 96.7 F | DIASTOLIC BLOOD PRESSURE: 59 MMHG | SYSTOLIC BLOOD PRESSURE: 124 MMHG | OXYGEN SATURATION: 95 %

## 2023-01-01 VITALS
SYSTOLIC BLOOD PRESSURE: 114 MMHG | WEIGHT: 243.3 LBS | OXYGEN SATURATION: 92 % | HEART RATE: 81 BPM | DIASTOLIC BLOOD PRESSURE: 55 MMHG | TEMPERATURE: 97.1 F | RESPIRATION RATE: 17 BRPM | BODY MASS INDEX: 34.91 KG/M2

## 2023-01-01 VITALS
DIASTOLIC BLOOD PRESSURE: 74 MMHG | HEART RATE: 70 BPM | TEMPERATURE: 97.8 F | SYSTOLIC BLOOD PRESSURE: 125 MMHG | OXYGEN SATURATION: 98 %

## 2023-01-01 VITALS
RESPIRATION RATE: 12 BRPM | OXYGEN SATURATION: 97 % | TEMPERATURE: 97.7 F | HEART RATE: 68 BPM | SYSTOLIC BLOOD PRESSURE: 121 MMHG | DIASTOLIC BLOOD PRESSURE: 54 MMHG

## 2023-01-01 VITALS
BODY MASS INDEX: 36.45 KG/M2 | DIASTOLIC BLOOD PRESSURE: 70 MMHG | SYSTOLIC BLOOD PRESSURE: 130 MMHG | OXYGEN SATURATION: 98 % | RESPIRATION RATE: 19 BRPM | HEART RATE: 71 BPM | WEIGHT: 254 LBS | TEMPERATURE: 97.8 F

## 2023-01-01 VITALS — HEIGHT: 70 IN | WEIGHT: 264 LBS | BODY MASS INDEX: 37.8 KG/M2

## 2023-01-01 DIAGNOSIS — L89.620 PRESSURE INJURY OF LEFT HEEL, UNSTAGEABLE (H): ICD-10-CM

## 2023-01-01 DIAGNOSIS — Z96.649 FAILURE OF TOTAL HIP ARTHROPLASTY, UNSPECIFIED LATERALITY, SEQUELA: ICD-10-CM

## 2023-01-01 DIAGNOSIS — L24.A2 IRRITANT CONTACT DERMATITIS DUE TO INCONTINENCE OF BOTH FECES AND URINE: ICD-10-CM

## 2023-01-01 DIAGNOSIS — T84.50XS INFECTION OF PROSTHETIC JOINT, SEQUELA: ICD-10-CM

## 2023-01-01 DIAGNOSIS — T84.50XA PROSTHETIC JOINT INFECTION, INITIAL ENCOUNTER (H): ICD-10-CM

## 2023-01-01 DIAGNOSIS — N17.0 ACUTE RENAL FAILURE WITH TUBULAR NECROSIS (H): ICD-10-CM

## 2023-01-01 DIAGNOSIS — Z79.2 ENCOUNTER FOR LONG-TERM (CURRENT) USE OF ANTIBIOTICS: Primary | ICD-10-CM

## 2023-01-01 DIAGNOSIS — T84.018S FAILURE OF TOTAL HIP ARTHROPLASTY, UNSPECIFIED LATERALITY, SEQUELA: ICD-10-CM

## 2023-01-01 DIAGNOSIS — M70.72 ILIOPSOAS BURSITIS OF LEFT HIP: ICD-10-CM

## 2023-01-01 DIAGNOSIS — I20.89 OTHER FORMS OF ANGINA PECTORIS (H): ICD-10-CM

## 2023-01-01 DIAGNOSIS — T84.50XD INFECTION OF PROSTHETIC JOINT, SUBSEQUENT ENCOUNTER: Primary | ICD-10-CM

## 2023-01-01 DIAGNOSIS — T84.50XA PROSTHETIC JOINT INFECTION, INITIAL ENCOUNTER (H): Primary | ICD-10-CM

## 2023-01-01 DIAGNOSIS — I10 ESSENTIAL HYPERTENSION: ICD-10-CM

## 2023-01-01 DIAGNOSIS — E78.5 DYSLIPIDEMIA: ICD-10-CM

## 2023-01-01 DIAGNOSIS — T84.50XD INFECTION OF PROSTHETIC JOINT, SUBSEQUENT ENCOUNTER: ICD-10-CM

## 2023-01-01 DIAGNOSIS — N18.31 CHRONIC KIDNEY DISEASE, STAGE 3A (H): ICD-10-CM

## 2023-01-01 DIAGNOSIS — T84.50XS INFECTION OF PROSTHETIC JOINT, SEQUELA: Primary | ICD-10-CM

## 2023-01-01 DIAGNOSIS — F51.01 PRIMARY INSOMNIA: ICD-10-CM

## 2023-01-01 DIAGNOSIS — R53.81 PHYSICAL DECONDITIONING: ICD-10-CM

## 2023-01-01 DIAGNOSIS — K59.02 CONSTIPATION DUE TO OUTLET DYSFUNCTION: ICD-10-CM

## 2023-01-01 DIAGNOSIS — K59.01 SLOW TRANSIT CONSTIPATION: ICD-10-CM

## 2023-01-01 DIAGNOSIS — K21.9 GASTROESOPHAGEAL REFLUX DISEASE WITHOUT ESOPHAGITIS: ICD-10-CM

## 2023-01-01 DIAGNOSIS — S91.309A OPEN WOUND OF FOOT, UNSPECIFIED LATERALITY, INITIAL ENCOUNTER: ICD-10-CM

## 2023-01-01 DIAGNOSIS — N17.8 OTHER ACUTE KIDNEY FAILURE (H): Primary | ICD-10-CM

## 2023-01-01 DIAGNOSIS — E11.69 TYPE 2 DIABETES MELLITUS WITH OTHER SPECIFIED COMPLICATION, UNSPECIFIED WHETHER LONG TERM INSULIN USE (H): ICD-10-CM

## 2023-01-01 DIAGNOSIS — D62 ACUTE BLOOD LOSS ANEMIA: ICD-10-CM

## 2023-01-01 DIAGNOSIS — L89.90 PRESSURE INJURY DUE TO MEDICAL DEVICE: ICD-10-CM

## 2023-01-01 DIAGNOSIS — R35.1 BENIGN PROSTATIC HYPERPLASIA WITH NOCTURIA: ICD-10-CM

## 2023-01-01 DIAGNOSIS — R32 URINARY INCONTINENCE, UNSPECIFIED TYPE: ICD-10-CM

## 2023-01-01 DIAGNOSIS — Z96.649 STATUS POST REVISION OF TOTAL HIP REPLACEMENT: Primary | ICD-10-CM

## 2023-01-01 DIAGNOSIS — R78.81 BACTEREMIA: ICD-10-CM

## 2023-01-01 DIAGNOSIS — N40.1 BENIGN PROSTATIC HYPERPLASIA WITH NOCTURIA: ICD-10-CM

## 2023-01-01 DIAGNOSIS — T85.898A PRESSURE INJURY DUE TO MEDICAL DEVICE: ICD-10-CM

## 2023-01-01 DIAGNOSIS — Z86.711 HISTORY OF PULMONARY EMBOLISM: ICD-10-CM

## 2023-01-01 LAB
ABO/RH(D): NORMAL
ALBUMIN MFR UR ELPH: 29.7 MG/DL
ALBUMIN SERPL BCG-MCNC: 2.2 G/DL (ref 3.5–5.2)
ALBUMIN SERPL BCG-MCNC: 2.3 G/DL (ref 3.5–5.2)
ALBUMIN SERPL BCG-MCNC: 2.3 G/DL (ref 3.5–5.2)
ALBUMIN SERPL BCG-MCNC: 2.4 G/DL (ref 3.5–5.2)
ALBUMIN SERPL BCG-MCNC: 2.5 G/DL (ref 3.5–5.2)
ALBUMIN SERPL BCG-MCNC: 2.6 G/DL (ref 3.5–5.2)
ALBUMIN SERPL BCG-MCNC: 2.7 G/DL (ref 3.5–5.2)
ALBUMIN SERPL BCG-MCNC: 2.8 G/DL (ref 3.5–5.2)
ALBUMIN SERPL BCG-MCNC: 3 G/DL (ref 3.5–5.2)
ALBUMIN SERPL BCG-MCNC: 3.1 G/DL (ref 3.5–5.2)
ALBUMIN SERPL BCG-MCNC: 3.2 G/DL (ref 3.5–5.2)
ALBUMIN SERPL BCG-MCNC: 3.3 G/DL (ref 3.5–5.2)
ALBUMIN SERPL BCG-MCNC: 3.4 G/DL (ref 3.5–5.2)
ALBUMIN SERPL BCG-MCNC: 3.7 G/DL (ref 3.5–5.2)
ALBUMIN SERPL BCG-MCNC: 3.7 G/DL (ref 3.5–5.2)
ALBUMIN UR-MCNC: 10 MG/DL
ALBUMIN UR-MCNC: 50 MG/DL
ALBUMIN UR-MCNC: 50 MG/DL
ALBUMIN UR-MCNC: 70 MG/DL
ALLEN'S TEST: YES
ALP SERPL-CCNC: 103 U/L (ref 40–129)
ALP SERPL-CCNC: 110 U/L (ref 40–129)
ALP SERPL-CCNC: 114 U/L (ref 40–129)
ALP SERPL-CCNC: 63 U/L (ref 40–129)
ALP SERPL-CCNC: 72 U/L (ref 40–129)
ALP SERPL-CCNC: 72 U/L (ref 40–129)
ALP SERPL-CCNC: 73 U/L (ref 40–129)
ALP SERPL-CCNC: 74 U/L (ref 40–129)
ALP SERPL-CCNC: 76 U/L (ref 40–129)
ALP SERPL-CCNC: 79 U/L (ref 40–129)
ALP SERPL-CCNC: 80 U/L (ref 40–129)
ALP SERPL-CCNC: 80 U/L (ref 40–129)
ALP SERPL-CCNC: 92 U/L (ref 40–129)
ALP SERPL-CCNC: 93 U/L (ref 40–129)
ALP SERPL-CCNC: 94 U/L (ref 40–129)
ALP SERPL-CCNC: 99 U/L (ref 40–129)
ALT SERPL W P-5'-P-CCNC: 10 U/L (ref 0–70)
ALT SERPL W P-5'-P-CCNC: 10 U/L (ref 0–70)
ALT SERPL W P-5'-P-CCNC: 11 U/L (ref 0–70)
ALT SERPL W P-5'-P-CCNC: 11 U/L (ref 0–70)
ALT SERPL W P-5'-P-CCNC: 12 U/L (ref 0–70)
ALT SERPL W P-5'-P-CCNC: 13 U/L (ref 10–50)
ALT SERPL W P-5'-P-CCNC: 14 U/L (ref 0–70)
ALT SERPL W P-5'-P-CCNC: 15 U/L (ref 0–70)
ALT SERPL W P-5'-P-CCNC: 15 U/L (ref 0–70)
ALT SERPL W P-5'-P-CCNC: 6 U/L (ref 10–50)
ALT SERPL W P-5'-P-CCNC: 6 U/L (ref 10–50)
ALT SERPL W P-5'-P-CCNC: 8 U/L (ref 0–70)
ALT SERPL W P-5'-P-CCNC: 8 U/L (ref 0–70)
ALT SERPL W P-5'-P-CCNC: 9 U/L (ref 0–70)
AMMONIA PLAS-SCNC: 15 UMOL/L (ref 16–60)
AMORPH CRY #/AREA URNS HPF: ABNORMAL /HPF
AMORPH CRY #/AREA URNS HPF: ABNORMAL /HPF
ANION GAP SERPL CALCULATED.3IONS-SCNC: 10 MMOL/L (ref 7–15)
ANION GAP SERPL CALCULATED.3IONS-SCNC: 11 MMOL/L (ref 7–15)
ANION GAP SERPL CALCULATED.3IONS-SCNC: 12 MMOL/L (ref 7–15)
ANION GAP SERPL CALCULATED.3IONS-SCNC: 13 MMOL/L (ref 7–15)
ANION GAP SERPL CALCULATED.3IONS-SCNC: 14 MMOL/L (ref 7–15)
ANION GAP SERPL CALCULATED.3IONS-SCNC: 15 MMOL/L (ref 7–15)
ANION GAP SERPL CALCULATED.3IONS-SCNC: 17 MMOL/L (ref 7–15)
ANION GAP SERPL CALCULATED.3IONS-SCNC: 17 MMOL/L (ref 7–15)
ANION GAP SERPL CALCULATED.3IONS-SCNC: 18 MMOL/L (ref 7–15)
ANION GAP SERPL CALCULATED.3IONS-SCNC: 3 MMOL/L (ref 3–14)
ANION GAP SERPL CALCULATED.3IONS-SCNC: 4 MMOL/L (ref 3–14)
ANION GAP SERPL CALCULATED.3IONS-SCNC: 5 MMOL/L (ref 3–14)
ANION GAP SERPL CALCULATED.3IONS-SCNC: 6 MMOL/L (ref 3–14)
ANION GAP SERPL CALCULATED.3IONS-SCNC: 6 MMOL/L (ref 3–14)
ANION GAP SERPL CALCULATED.3IONS-SCNC: 7 MMOL/L (ref 3–14)
ANION GAP SERPL CALCULATED.3IONS-SCNC: 7 MMOL/L (ref 7–15)
ANION GAP SERPL CALCULATED.3IONS-SCNC: 8 MMOL/L (ref 3–14)
ANION GAP SERPL CALCULATED.3IONS-SCNC: 8 MMOL/L (ref 3–14)
ANION GAP SERPL CALCULATED.3IONS-SCNC: 8 MMOL/L (ref 7–15)
ANION GAP SERPL CALCULATED.3IONS-SCNC: 9 MMOL/L (ref 7–15)
ANTIBODY SCREEN: NEGATIVE
APPEARANCE FLD: ABNORMAL
APPEARANCE UR: ABNORMAL
APPEARANCE UR: CLEAR
AST SERPL W P-5'-P-CCNC: 10 U/L (ref 10–50)
AST SERPL W P-5'-P-CCNC: 11 U/L (ref 0–45)
AST SERPL W P-5'-P-CCNC: 12 U/L (ref 0–45)
AST SERPL W P-5'-P-CCNC: 12 U/L (ref 10–50)
AST SERPL W P-5'-P-CCNC: 13 U/L (ref 0–45)
AST SERPL W P-5'-P-CCNC: 14 U/L (ref 0–45)
AST SERPL W P-5'-P-CCNC: 14 U/L (ref 10–50)
AST SERPL W P-5'-P-CCNC: 15 U/L (ref 0–45)
AST SERPL W P-5'-P-CCNC: 16 U/L (ref 0–45)
AST SERPL W P-5'-P-CCNC: 17 U/L (ref 0–45)
AST SERPL W P-5'-P-CCNC: 17 U/L (ref 0–45)
AST SERPL W P-5'-P-CCNC: 18 U/L (ref 0–45)
AST SERPL W P-5'-P-CCNC: 18 U/L (ref 0–45)
AST SERPL W P-5'-P-CCNC: 19 U/L (ref 0–45)
AST SERPL W P-5'-P-CCNC: 19 U/L (ref 0–45)
AST SERPL W P-5'-P-CCNC: 9 U/L (ref 0–45)
ATRIAL RATE - MUSE: 65 BPM
ATRIAL RATE - MUSE: 65 BPM
ATRIAL RATE - MUSE: 68 BPM
ATRIAL RATE - MUSE: 70 BPM
ATRIAL RATE - MUSE: 71 BPM
ATRIAL RATE - MUSE: 71 BPM
ATRIAL RATE - MUSE: 75 BPM
ATRIAL RATE - MUSE: 76 BPM
ATRIAL RATE - MUSE: 78 BPM
ATRIAL RATE - MUSE: 83 BPM
BACTERIA #/AREA URNS HPF: ABNORMAL /HPF
BACTERIA #/AREA URNS HPF: ABNORMAL /HPF
BACTERIA BLD CULT: ABNORMAL
BACTERIA BLD CULT: NO GROWTH
BACTERIA SNV CULT: ABNORMAL
BACTERIA SNV CULT: NO GROWTH
BACTERIA SNV CULT: NORMAL
BACTERIA SNV CULT: NORMAL
BACTERIA TISS BX CULT: ABNORMAL
BACTERIA TISS BX CULT: NO GROWTH
BACTERIA TISS BX CULT: NORMAL
BACTERIA UR CULT: ABNORMAL
BACTERIA UR CULT: NORMAL
BASE EXCESS BLDA CALC-SCNC: 1.3 MMOL/L (ref -9–1.8)
BASE EXCESS BLDV CALC-SCNC: -2.1 MMOL/L (ref -7.7–1.9)
BASE EXCESS BLDV CALC-SCNC: -3 MMOL/L (ref -7.7–1.9)
BASOPHILS # BLD AUTO: 0 10E3/UL (ref 0–0.2)
BASOPHILS # BLD AUTO: 0.1 10E3/UL (ref 0–0.2)
BASOPHILS NFR BLD AUTO: 0 %
BASOPHILS NFR BLD AUTO: 1 %
BASOPHILS NFR FLD MANUAL: 1 %
BILIRUB DIRECT SERPL-MCNC: <0.2 MG/DL (ref 0–0.3)
BILIRUB DIRECT SERPL-MCNC: <0.2 MG/DL (ref 0–0.3)
BILIRUB SERPL-MCNC: 0.2 MG/DL
BILIRUB SERPL-MCNC: 0.3 MG/DL
BILIRUB SERPL-MCNC: <0.2 MG/DL
BILIRUB UR QL STRIP: NEGATIVE
BLD PROD TYP BPU: NORMAL
BLOOD COMPONENT TYPE: NORMAL
BUN SERPL-MCNC: 17.2 MG/DL (ref 8–23)
BUN SERPL-MCNC: 21 MG/DL (ref 7–30)
BUN SERPL-MCNC: 24.5 MG/DL (ref 8–23)
BUN SERPL-MCNC: 26 MG/DL (ref 8–23)
BUN SERPL-MCNC: 26.6 MG/DL (ref 8–23)
BUN SERPL-MCNC: 27.1 MG/DL (ref 8–23)
BUN SERPL-MCNC: 27.7 MG/DL (ref 8–23)
BUN SERPL-MCNC: 28 MG/DL (ref 7–30)
BUN SERPL-MCNC: 28.5 MG/DL (ref 8–23)
BUN SERPL-MCNC: 28.6 MG/DL (ref 8–23)
BUN SERPL-MCNC: 28.9 MG/DL (ref 8–23)
BUN SERPL-MCNC: 29.8 MG/DL (ref 8–23)
BUN SERPL-MCNC: 30 MG/DL (ref 7–30)
BUN SERPL-MCNC: 30.1 MG/DL (ref 8–23)
BUN SERPL-MCNC: 30.7 MG/DL (ref 8–23)
BUN SERPL-MCNC: 30.7 MG/DL (ref 8–23)
BUN SERPL-MCNC: 30.8 MG/DL (ref 8–23)
BUN SERPL-MCNC: 31 MG/DL (ref 7–30)
BUN SERPL-MCNC: 31.1 MG/DL (ref 8–23)
BUN SERPL-MCNC: 31.5 MG/DL (ref 8–23)
BUN SERPL-MCNC: 31.5 MG/DL (ref 8–23)
BUN SERPL-MCNC: 31.7 MG/DL (ref 8–23)
BUN SERPL-MCNC: 31.7 MG/DL (ref 8–23)
BUN SERPL-MCNC: 31.8 MG/DL (ref 8–23)
BUN SERPL-MCNC: 31.9 MG/DL (ref 8–23)
BUN SERPL-MCNC: 31.9 MG/DL (ref 8–23)
BUN SERPL-MCNC: 32.1 MG/DL (ref 8–23)
BUN SERPL-MCNC: 32.2 MG/DL (ref 8–23)
BUN SERPL-MCNC: 32.7 MG/DL (ref 8–23)
BUN SERPL-MCNC: 33 MG/DL (ref 7–30)
BUN SERPL-MCNC: 33 MG/DL (ref 7–30)
BUN SERPL-MCNC: 33.4 MG/DL (ref 8–23)
BUN SERPL-MCNC: 33.5 MG/DL (ref 8–23)
BUN SERPL-MCNC: 33.6 MG/DL (ref 8–23)
BUN SERPL-MCNC: 34 MG/DL (ref 7–30)
BUN SERPL-MCNC: 34 MG/DL (ref 7–30)
BUN SERPL-MCNC: 34 MG/DL (ref 8–23)
BUN SERPL-MCNC: 34.5 MG/DL (ref 8–23)
BUN SERPL-MCNC: 34.7 MG/DL (ref 8–23)
BUN SERPL-MCNC: 35 MG/DL (ref 7–30)
BUN SERPL-MCNC: 35.3 MG/DL (ref 8–23)
BUN SERPL-MCNC: 36.4 MG/DL (ref 8–23)
BUN SERPL-MCNC: 37 MG/DL (ref 7–30)
BUN SERPL-MCNC: 37.8 MG/DL (ref 8–23)
BUN SERPL-MCNC: 38.3 MG/DL (ref 8–23)
BUN SERPL-MCNC: 38.4 MG/DL (ref 8–23)
BUN SERPL-MCNC: 38.5 MG/DL (ref 8–23)
BUN SERPL-MCNC: 38.7 MG/DL (ref 8–23)
BUN SERPL-MCNC: 38.9 MG/DL (ref 8–23)
BUN SERPL-MCNC: 39 MG/DL (ref 7–30)
BUN SERPL-MCNC: 39 MG/DL (ref 8–23)
BUN SERPL-MCNC: 39.1 MG/DL (ref 8–23)
BUN SERPL-MCNC: 39.2 MG/DL (ref 8–23)
BUN SERPL-MCNC: 39.6 MG/DL (ref 8–23)
BUN SERPL-MCNC: 39.8 MG/DL (ref 8–23)
BUN SERPL-MCNC: 39.8 MG/DL (ref 8–23)
BUN SERPL-MCNC: 40.2 MG/DL (ref 8–23)
BUN SERPL-MCNC: 40.3 MG/DL (ref 8–23)
BUN SERPL-MCNC: 40.4 MG/DL (ref 8–23)
BUN SERPL-MCNC: 41 MG/DL (ref 8–23)
BUN SERPL-MCNC: 41.4 MG/DL (ref 8–23)
BUN SERPL-MCNC: 41.7 MG/DL (ref 8–23)
BUN SERPL-MCNC: 41.9 MG/DL (ref 8–23)
BUN SERPL-MCNC: 42.1 MG/DL (ref 8–23)
BUN SERPL-MCNC: 42.8 MG/DL (ref 8–23)
BUN SERPL-MCNC: 43 MG/DL (ref 8–23)
BUN SERPL-MCNC: 43.2 MG/DL (ref 8–23)
BUN SERPL-MCNC: 43.3 MG/DL (ref 8–23)
BUN SERPL-MCNC: 43.6 MG/DL (ref 8–23)
BUN SERPL-MCNC: 44.3 MG/DL (ref 8–23)
BUN SERPL-MCNC: 45 MG/DL (ref 8–23)
BUN SERPL-MCNC: 45.1 MG/DL (ref 8–23)
BUN SERPL-MCNC: 45.2 MG/DL (ref 8–23)
BUN SERPL-MCNC: 45.4 MG/DL (ref 8–23)
BUN SERPL-MCNC: 45.5 MG/DL (ref 8–23)
BUN SERPL-MCNC: 45.9 MG/DL (ref 8–23)
BUN SERPL-MCNC: 46 MG/DL (ref 8–23)
BUN SERPL-MCNC: 46.3 MG/DL (ref 8–23)
BUN SERPL-MCNC: 46.4 MG/DL (ref 8–23)
BUN SERPL-MCNC: 46.5 MG/DL (ref 8–23)
BUN SERPL-MCNC: 46.6 MG/DL (ref 8–23)
BUN SERPL-MCNC: 46.8 MG/DL (ref 8–23)
BUN SERPL-MCNC: 47.2 MG/DL (ref 8–23)
BUN SERPL-MCNC: 47.3 MG/DL (ref 8–23)
BUN SERPL-MCNC: 47.5 MG/DL (ref 8–23)
BUN SERPL-MCNC: 49.2 MG/DL (ref 8–23)
BUN SERPL-MCNC: 49.7 MG/DL (ref 8–23)
BUN SERPL-MCNC: 50.2 MG/DL (ref 8–23)
BUN SERPL-MCNC: 50.2 MG/DL (ref 8–23)
BUN SERPL-MCNC: 50.4 MG/DL (ref 8–23)
BUN SERPL-MCNC: 51.9 MG/DL (ref 8–23)
BUN SERPL-MCNC: 51.9 MG/DL (ref 8–23)
BUN SERPL-MCNC: 53.6 MG/DL (ref 8–23)
BUN SERPL-MCNC: 55.1 MG/DL (ref 8–23)
BUN SERPL-MCNC: 56.3 MG/DL (ref 8–23)
BUN SERPL-MCNC: 56.8 MG/DL (ref 8–23)
BUN SERPL-MCNC: 56.9 MG/DL (ref 8–23)
BUN SERPL-MCNC: 57 MG/DL (ref 8–23)
BUN SERPL-MCNC: 57.4 MG/DL (ref 8–23)
BUN SERPL-MCNC: 57.7 MG/DL (ref 8–23)
C3 SERPL-MCNC: 83 MG/DL (ref 81–157)
C4 SERPL-MCNC: 32 MG/DL (ref 13–39)
CALCIUM SERPL-MCNC: 7.1 MG/DL (ref 8.8–10.2)
CALCIUM SERPL-MCNC: 7.2 MG/DL (ref 8.8–10.2)
CALCIUM SERPL-MCNC: 7.2 MG/DL (ref 8.8–10.2)
CALCIUM SERPL-MCNC: 7.3 MG/DL (ref 8.8–10.2)
CALCIUM SERPL-MCNC: 7.3 MG/DL (ref 8.8–10.2)
CALCIUM SERPL-MCNC: 7.5 MG/DL (ref 8.8–10.2)
CALCIUM SERPL-MCNC: 7.5 MG/DL (ref 8.8–10.2)
CALCIUM SERPL-MCNC: 7.6 MG/DL (ref 8.8–10.2)
CALCIUM SERPL-MCNC: 7.7 MG/DL (ref 8.8–10.2)
CALCIUM SERPL-MCNC: 7.8 MG/DL (ref 8.8–10.2)
CALCIUM SERPL-MCNC: 7.9 MG/DL (ref 8.8–10.2)
CALCIUM SERPL-MCNC: 8 MG/DL (ref 8.8–10.2)
CALCIUM SERPL-MCNC: 8.1 MG/DL (ref 8.8–10.2)
CALCIUM SERPL-MCNC: 8.2 MG/DL (ref 8.5–10.1)
CALCIUM SERPL-MCNC: 8.2 MG/DL (ref 8.8–10.2)
CALCIUM SERPL-MCNC: 8.3 MG/DL (ref 8.5–10.1)
CALCIUM SERPL-MCNC: 8.3 MG/DL (ref 8.5–10.1)
CALCIUM SERPL-MCNC: 8.3 MG/DL (ref 8.8–10.2)
CALCIUM SERPL-MCNC: 8.4 MG/DL (ref 8.5–10.1)
CALCIUM SERPL-MCNC: 8.4 MG/DL (ref 8.8–10.2)
CALCIUM SERPL-MCNC: 8.5 MG/DL (ref 8.5–10.1)
CALCIUM SERPL-MCNC: 8.5 MG/DL (ref 8.5–10.1)
CALCIUM SERPL-MCNC: 8.5 MG/DL (ref 8.8–10.2)
CALCIUM SERPL-MCNC: 8.6 MG/DL (ref 8.5–10.1)
CALCIUM SERPL-MCNC: 8.6 MG/DL (ref 8.5–10.1)
CALCIUM SERPL-MCNC: 8.6 MG/DL (ref 8.8–10.2)
CALCIUM SERPL-MCNC: 8.7 MG/DL (ref 8.8–10.2)
CALCIUM SERPL-MCNC: 8.8 MG/DL (ref 8.5–10.1)
CALCIUM SERPL-MCNC: 8.8 MG/DL (ref 8.5–10.1)
CALCIUM SERPL-MCNC: 8.8 MG/DL (ref 8.8–10.2)
CALCIUM SERPL-MCNC: 8.9 MG/DL (ref 8.5–10.1)
CALCIUM SERPL-MCNC: 8.9 MG/DL (ref 8.8–10.2)
CALCIUM SERPL-MCNC: 9 MG/DL (ref 8.8–10.2)
CALCIUM SERPL-MCNC: 9 MG/DL (ref 8.8–10.2)
CALCIUM SERPL-MCNC: 9.1 MG/DL (ref 8.8–10.2)
CALCIUM SERPL-MCNC: 9.2 MG/DL (ref 8.8–10.2)
CALCIUM SERPL-MCNC: 9.5 MG/DL (ref 8.8–10.2)
CELL COUNT BODY FLUID SOURCE: ABNORMAL
CHLORIDE BLD-SCNC: 103 MMOL/L (ref 94–109)
CHLORIDE BLD-SCNC: 104 MMOL/L (ref 94–109)
CHLORIDE BLD-SCNC: 105 MMOL/L (ref 94–109)
CHLORIDE BLD-SCNC: 106 MMOL/L (ref 94–109)
CHLORIDE BLD-SCNC: 107 MMOL/L (ref 94–109)
CHLORIDE BLD-SCNC: 109 MMOL/L (ref 94–109)
CHLORIDE BLD-SCNC: 109 MMOL/L (ref 94–109)
CHLORIDE BLD-SCNC: 110 MMOL/L (ref 94–109)
CHLORIDE BLD-SCNC: 110 MMOL/L (ref 94–109)
CHLORIDE SERPL-SCNC: 100 MMOL/L (ref 98–107)
CHLORIDE SERPL-SCNC: 101 MMOL/L (ref 98–107)
CHLORIDE SERPL-SCNC: 102 MMOL/L (ref 98–107)
CHLORIDE SERPL-SCNC: 102 MMOL/L (ref 98–107)
CHLORIDE SERPL-SCNC: 103 MMOL/L (ref 98–107)
CHLORIDE SERPL-SCNC: 104 MMOL/L (ref 98–107)
CHLORIDE SERPL-SCNC: 105 MMOL/L (ref 98–107)
CHLORIDE SERPL-SCNC: 106 MMOL/L (ref 98–107)
CHLORIDE SERPL-SCNC: 107 MMOL/L (ref 98–107)
CHLORIDE SERPL-SCNC: 108 MMOL/L (ref 98–107)
CHLORIDE SERPL-SCNC: 109 MMOL/L (ref 98–107)
CHLORIDE SERPL-SCNC: 110 MMOL/L (ref 98–107)
CHLORIDE SERPL-SCNC: 111 MMOL/L (ref 98–107)
CHLORIDE SERPL-SCNC: 112 MMOL/L (ref 98–107)
CHLORIDE SERPL-SCNC: 113 MMOL/L (ref 98–107)
CHOLEST SERPL-MCNC: 105 MG/DL
CO2 SERPL-SCNC: 23 MMOL/L (ref 20–32)
CO2 SERPL-SCNC: 25 MMOL/L (ref 20–32)
CO2 SERPL-SCNC: 26 MMOL/L (ref 20–32)
CO2 SERPL-SCNC: 26 MMOL/L (ref 20–32)
CO2 SERPL-SCNC: 27 MMOL/L (ref 20–32)
CO2 SERPL-SCNC: 27 MMOL/L (ref 20–32)
CO2 SERPL-SCNC: 28 MMOL/L (ref 20–32)
CO2 SERPL-SCNC: 29 MMOL/L (ref 20–32)
CODING SYSTEM: NORMAL
COLOR FLD: ABNORMAL
COLOR UR AUTO: ABNORMAL
COLOR UR AUTO: ABNORMAL
COLOR UR AUTO: YELLOW
COLOR UR AUTO: YELLOW
CREAT SERPL-MCNC: 0.85 MG/DL (ref 0.67–1.17)
CREAT SERPL-MCNC: 0.87 MG/DL (ref 0.67–1.17)
CREAT SERPL-MCNC: 0.87 MG/DL (ref 0.67–1.17)
CREAT SERPL-MCNC: 0.89 MG/DL (ref 0.66–1.25)
CREAT SERPL-MCNC: 0.89 MG/DL (ref 0.67–1.17)
CREAT SERPL-MCNC: 0.9 MG/DL (ref 0.66–1.25)
CREAT SERPL-MCNC: 0.9 MG/DL (ref 0.67–1.17)
CREAT SERPL-MCNC: 0.9 MG/DL (ref 0.67–1.17)
CREAT SERPL-MCNC: 0.93 MG/DL (ref 0.67–1.17)
CREAT SERPL-MCNC: 0.94 MG/DL (ref 0.67–1.17)
CREAT SERPL-MCNC: 0.96 MG/DL (ref 0.67–1.17)
CREAT SERPL-MCNC: 0.96 MG/DL (ref 0.67–1.17)
CREAT SERPL-MCNC: 0.97 MG/DL (ref 0.67–1.17)
CREAT SERPL-MCNC: 0.98 MG/DL (ref 0.67–1.17)
CREAT SERPL-MCNC: 0.99 MG/DL (ref 0.66–1.25)
CREAT SERPL-MCNC: 0.99 MG/DL (ref 0.67–1.17)
CREAT SERPL-MCNC: 1 MG/DL (ref 0.67–1.17)
CREAT SERPL-MCNC: 1.04 MG/DL (ref 0.66–1.25)
CREAT SERPL-MCNC: 1.05 MG/DL (ref 0.66–1.25)
CREAT SERPL-MCNC: 1.05 MG/DL (ref 0.67–1.17)
CREAT SERPL-MCNC: 1.06 MG/DL (ref 0.66–1.25)
CREAT SERPL-MCNC: 1.07 MG/DL (ref 0.67–1.17)
CREAT SERPL-MCNC: 1.08 MG/DL (ref 0.66–1.25)
CREAT SERPL-MCNC: 1.09 MG/DL (ref 0.67–1.17)
CREAT SERPL-MCNC: 1.1 MG/DL (ref 0.66–1.25)
CREAT SERPL-MCNC: 1.1 MG/DL (ref 0.67–1.17)
CREAT SERPL-MCNC: 1.11 MG/DL (ref 0.67–1.17)
CREAT SERPL-MCNC: 1.13 MG/DL (ref 0.67–1.17)
CREAT SERPL-MCNC: 1.13 MG/DL (ref 0.67–1.17)
CREAT SERPL-MCNC: 1.14 MG/DL (ref 0.67–1.17)
CREAT SERPL-MCNC: 1.15 MG/DL (ref 0.66–1.25)
CREAT SERPL-MCNC: 1.15 MG/DL (ref 0.67–1.17)
CREAT SERPL-MCNC: 1.19 MG/DL (ref 0.67–1.17)
CREAT SERPL-MCNC: 1.19 MG/DL (ref 0.67–1.17)
CREAT SERPL-MCNC: 1.21 MG/DL (ref 0.67–1.17)
CREAT SERPL-MCNC: 1.24 MG/DL (ref 0.67–1.17)
CREAT SERPL-MCNC: 1.3 MG/DL (ref 0.66–1.25)
CREAT SERPL-MCNC: 1.33 MG/DL (ref 0.66–1.25)
CREAT SERPL-MCNC: 1.4 MG/DL (ref 0.67–1.17)
CREAT SERPL-MCNC: 1.42 MG/DL (ref 0.67–1.17)
CREAT SERPL-MCNC: 1.59 MG/DL (ref 0.67–1.17)
CREAT SERPL-MCNC: 1.59 MG/DL (ref 0.67–1.17)
CREAT SERPL-MCNC: 1.65 MG/DL (ref 0.67–1.17)
CREAT SERPL-MCNC: 1.7 MG/DL (ref 0.67–1.17)
CREAT SERPL-MCNC: 1.84 MG/DL (ref 0.67–1.17)
CREAT SERPL-MCNC: 1.88 MG/DL (ref 0.67–1.17)
CREAT SERPL-MCNC: 1.95 MG/DL (ref 0.67–1.17)
CREAT SERPL-MCNC: 1.95 MG/DL (ref 0.67–1.17)
CREAT SERPL-MCNC: 1.98 MG/DL (ref 0.67–1.17)
CREAT SERPL-MCNC: 2.01 MG/DL (ref 0.67–1.17)
CREAT SERPL-MCNC: 2.01 MG/DL (ref 0.67–1.17)
CREAT SERPL-MCNC: 2.04 MG/DL (ref 0.67–1.17)
CREAT SERPL-MCNC: 2.05 MG/DL (ref 0.67–1.17)
CREAT SERPL-MCNC: 2.05 MG/DL (ref 0.67–1.17)
CREAT SERPL-MCNC: 2.08 MG/DL (ref 0.67–1.17)
CREAT SERPL-MCNC: 2.09 MG/DL (ref 0.67–1.17)
CREAT SERPL-MCNC: 2.1 MG/DL (ref 0.67–1.17)
CREAT SERPL-MCNC: 2.11 MG/DL (ref 0.67–1.17)
CREAT SERPL-MCNC: 2.11 MG/DL (ref 0.67–1.17)
CREAT SERPL-MCNC: 2.13 MG/DL (ref 0.67–1.17)
CREAT SERPL-MCNC: 2.18 MG/DL (ref 0.67–1.17)
CREAT SERPL-MCNC: 2.2 MG/DL (ref 0.67–1.17)
CREAT SERPL-MCNC: 2.22 MG/DL (ref 0.67–1.17)
CREAT SERPL-MCNC: 2.31 MG/DL (ref 0.67–1.17)
CREAT SERPL-MCNC: 2.32 MG/DL (ref 0.67–1.17)
CREAT SERPL-MCNC: 2.34 MG/DL (ref 0.67–1.17)
CREAT SERPL-MCNC: 2.4 MG/DL (ref 0.67–1.17)
CREAT SERPL-MCNC: 2.43 MG/DL (ref 0.67–1.17)
CREAT SERPL-MCNC: 2.48 MG/DL (ref 0.67–1.17)
CREAT SERPL-MCNC: 2.51 MG/DL (ref 0.67–1.17)
CREAT SERPL-MCNC: 2.72 MG/DL (ref 0.67–1.17)
CREAT SERPL-MCNC: 2.81 MG/DL (ref 0.67–1.17)
CREAT SERPL-MCNC: 2.82 MG/DL (ref 0.67–1.17)
CREAT SERPL-MCNC: 2.82 MG/DL (ref 0.67–1.17)
CREAT SERPL-MCNC: 2.91 MG/DL (ref 0.67–1.17)
CREAT SERPL-MCNC: 2.95 MG/DL (ref 0.67–1.17)
CREAT SERPL-MCNC: 2.96 MG/DL (ref 0.67–1.17)
CREAT SERPL-MCNC: 2.98 MG/DL (ref 0.67–1.17)
CREAT SERPL-MCNC: 3.18 MG/DL (ref 0.67–1.17)
CREAT SERPL-MCNC: 3.3 MG/DL (ref 0.67–1.17)
CREAT SERPL-MCNC: 3.33 MG/DL (ref 0.67–1.17)
CREAT SERPL-MCNC: 3.36 MG/DL (ref 0.67–1.17)
CREAT SERPL-MCNC: 3.71 MG/DL (ref 0.67–1.17)
CREAT SERPL-MCNC: 3.79 MG/DL (ref 0.67–1.17)
CREAT SERPL-MCNC: 3.85 MG/DL (ref 0.67–1.17)
CREAT SERPL-MCNC: 3.93 MG/DL (ref 0.67–1.17)
CREAT SERPL-MCNC: 3.99 MG/DL (ref 0.67–1.17)
CREAT SERPL-MCNC: 4 MG/DL (ref 0.67–1.17)
CREAT SERPL-MCNC: 4.02 MG/DL (ref 0.67–1.17)
CREAT SERPL-MCNC: 4.05 MG/DL (ref 0.67–1.17)
CREAT SERPL-MCNC: 4.22 MG/DL (ref 0.67–1.17)
CREAT SERPL-MCNC: 4.3 MG/DL (ref 0.67–1.17)
CREAT SERPL-MCNC: 4.44 MG/DL (ref 0.67–1.17)
CREAT SERPL-MCNC: 4.59 MG/DL (ref 0.67–1.17)
CREAT SERPL-MCNC: 4.66 MG/DL (ref 0.67–1.17)
CREAT SERPL-MCNC: 4.66 MG/DL (ref 0.67–1.17)
CREAT SERPL-MCNC: 4.77 MG/DL (ref 0.67–1.17)
CREAT SERPL-MCNC: 4.8 MG/DL (ref 0.67–1.17)
CREAT SERPL-MCNC: 4.86 MG/DL (ref 0.67–1.17)
CREAT SERPL-MCNC: 4.96 MG/DL (ref 0.67–1.17)
CREAT SERPL-MCNC: 5.15 MG/DL (ref 0.67–1.17)
CREAT SERPL-MCNC: 5.34 MG/DL (ref 0.67–1.17)
CREAT SERPL-MCNC: 5.35 MG/DL (ref 0.67–1.17)
CREAT SERPL-MCNC: 5.71 MG/DL (ref 0.67–1.17)
CREAT SERPL-MCNC: 5.75 MG/DL (ref 0.67–1.17)
CREAT SERPL-MCNC: 5.8 MG/DL (ref 0.67–1.17)
CREAT SERPL-MCNC: 5.81 MG/DL (ref 0.67–1.17)
CREAT SERPL-MCNC: 5.88 MG/DL (ref 0.67–1.17)
CREAT SERPL-MCNC: 6.05 MG/DL (ref 0.67–1.17)
CREAT UR-MCNC: 140 MG/DL
CREAT UR-MCNC: 143 MG/DL
CREAT UR-MCNC: 144.8 MG/DL
CREAT UR-MCNC: 146.2 MG/DL
CREAT UR-MCNC: 77 MG/DL
CROSSMATCH: NORMAL
CRP SERPL-MCNC: 118 MG/L
CRP SERPL-MCNC: 15.75 MG/L
CRP SERPL-MCNC: 17.8 MG/L
CRP SERPL-MCNC: 17.93 MG/L
CRP SERPL-MCNC: 18.94 MG/L
CRP SERPL-MCNC: 23.56 MG/L
CRP SERPL-MCNC: 24.57 MG/L
CRP SERPL-MCNC: 24.72 MG/L
CRP SERPL-MCNC: 256.16 MG/L
CRP SERPL-MCNC: 3.33 MG/L
CRP SERPL-MCNC: 3.6 MG/L (ref 0–8)
CRP SERPL-MCNC: 32.85 MG/L
CRP SERPL-MCNC: 4.4 MG/L
CRP SERPL-MCNC: 4.85 MG/L
CRP SERPL-MCNC: 42.85 MG/L
CRP SERPL-MCNC: 5.3 MG/L (ref 0–8)
CRP SERPL-MCNC: 57.6 MG/L
CRP SERPL-MCNC: 7.49 MG/L
CRP SERPL-MCNC: <2.9 MG/L (ref 0–8)
CRP SERPL-MCNC: <3 MG/L
CYSTATIN C (ROCHE): 4.2 MG/L (ref 0.6–1)
DEPRECATED CALCIDIOL+CALCIFEROL SERPL-MC: 16 UG/L (ref 20–75)
DEPRECATED HCO3 PLAS-SCNC: 16 MMOL/L (ref 22–29)
DEPRECATED HCO3 PLAS-SCNC: 19 MMOL/L (ref 22–29)
DEPRECATED HCO3 PLAS-SCNC: 20 MMOL/L (ref 22–29)
DEPRECATED HCO3 PLAS-SCNC: 21 MMOL/L (ref 22–29)
DEPRECATED HCO3 PLAS-SCNC: 22 MMOL/L (ref 22–29)
DEPRECATED HCO3 PLAS-SCNC: 23 MMOL/L (ref 22–29)
DEPRECATED HCO3 PLAS-SCNC: 24 MMOL/L (ref 22–29)
DEPRECATED HCO3 PLAS-SCNC: 25 MMOL/L (ref 22–29)
DEPRECATED HCO3 PLAS-SCNC: 26 MMOL/L (ref 22–29)
DEPRECATED HCO3 PLAS-SCNC: 28 MMOL/L (ref 22–29)
DIASTOLIC BLOOD PRESSURE - MUSE: NORMAL MMHG
EGFRCR SERPLBLD CKD-EPI 2021: 36 ML/MIN/1.73M2
EGFRCR SERPLBLD CKD-EPI 2021: 37 ML/MIN/1.73M2
EGFRCR SERPLBLD CKD-EPI 2021: 42 ML/MIN/1.73M2
ENTEROCOCCUS FAECALIS: NOT DETECTED
ENTEROCOCCUS FAECIUM: NOT DETECTED
EOSINOPHIL # BLD AUTO: 0 10E3/UL (ref 0–0.7)
EOSINOPHIL # BLD AUTO: 0.1 10E3/UL (ref 0–0.7)
EOSINOPHIL # BLD AUTO: 0.1 10E3/UL (ref 0–0.7)
EOSINOPHIL # BLD AUTO: 0.2 10E3/UL (ref 0–0.7)
EOSINOPHIL # BLD AUTO: 0.3 10E3/UL (ref 0–0.7)
EOSINOPHIL # BLD AUTO: 0.4 10E3/UL (ref 0–0.7)
EOSINOPHIL # BLD AUTO: 0.5 10E3/UL (ref 0–0.7)
EOSINOPHIL # BLD AUTO: 0.7 10E3/UL (ref 0–0.7)
EOSINOPHIL NFR BLD AUTO: 0 %
EOSINOPHIL NFR BLD AUTO: 1 %
EOSINOPHIL NFR BLD AUTO: 2 %
EOSINOPHIL NFR BLD AUTO: 3 %
EOSINOPHIL NFR BLD AUTO: 4 %
EOSINOPHIL NFR BLD AUTO: 6 %
EOSINOPHIL NFR BLD AUTO: 8 %
EOSINOPHIL NFR FLD MANUAL: 6 %
ERYTHROCYTE [DISTWIDTH] IN BLOOD BY AUTOMATED COUNT: 13.8 % (ref 10–15)
ERYTHROCYTE [DISTWIDTH] IN BLOOD BY AUTOMATED COUNT: 13.9 % (ref 10–15)
ERYTHROCYTE [DISTWIDTH] IN BLOOD BY AUTOMATED COUNT: 13.9 % (ref 10–15)
ERYTHROCYTE [DISTWIDTH] IN BLOOD BY AUTOMATED COUNT: 14 % (ref 10–15)
ERYTHROCYTE [DISTWIDTH] IN BLOOD BY AUTOMATED COUNT: 14 % (ref 10–15)
ERYTHROCYTE [DISTWIDTH] IN BLOOD BY AUTOMATED COUNT: 14.1 % (ref 10–15)
ERYTHROCYTE [DISTWIDTH] IN BLOOD BY AUTOMATED COUNT: 14.1 % (ref 10–15)
ERYTHROCYTE [DISTWIDTH] IN BLOOD BY AUTOMATED COUNT: 14.3 % (ref 10–15)
ERYTHROCYTE [DISTWIDTH] IN BLOOD BY AUTOMATED COUNT: 14.4 % (ref 10–15)
ERYTHROCYTE [DISTWIDTH] IN BLOOD BY AUTOMATED COUNT: 14.5 % (ref 10–15)
ERYTHROCYTE [DISTWIDTH] IN BLOOD BY AUTOMATED COUNT: 14.6 % (ref 10–15)
ERYTHROCYTE [DISTWIDTH] IN BLOOD BY AUTOMATED COUNT: 14.6 % (ref 10–15)
ERYTHROCYTE [DISTWIDTH] IN BLOOD BY AUTOMATED COUNT: 14.7 % (ref 10–15)
ERYTHROCYTE [DISTWIDTH] IN BLOOD BY AUTOMATED COUNT: 14.7 % (ref 10–15)
ERYTHROCYTE [DISTWIDTH] IN BLOOD BY AUTOMATED COUNT: 15 % (ref 10–15)
ERYTHROCYTE [DISTWIDTH] IN BLOOD BY AUTOMATED COUNT: 15.1 % (ref 10–15)
ERYTHROCYTE [DISTWIDTH] IN BLOOD BY AUTOMATED COUNT: 15.1 % (ref 10–15)
ERYTHROCYTE [DISTWIDTH] IN BLOOD BY AUTOMATED COUNT: 15.2 % (ref 10–15)
ERYTHROCYTE [DISTWIDTH] IN BLOOD BY AUTOMATED COUNT: 15.3 % (ref 10–15)
ERYTHROCYTE [DISTWIDTH] IN BLOOD BY AUTOMATED COUNT: 15.4 % (ref 10–15)
ERYTHROCYTE [DISTWIDTH] IN BLOOD BY AUTOMATED COUNT: 15.5 % (ref 10–15)
ERYTHROCYTE [DISTWIDTH] IN BLOOD BY AUTOMATED COUNT: 15.6 % (ref 10–15)
ERYTHROCYTE [DISTWIDTH] IN BLOOD BY AUTOMATED COUNT: 15.8 % (ref 10–15)
ERYTHROCYTE [DISTWIDTH] IN BLOOD BY AUTOMATED COUNT: 15.9 % (ref 10–15)
ERYTHROCYTE [DISTWIDTH] IN BLOOD BY AUTOMATED COUNT: 16.1 % (ref 10–15)
ERYTHROCYTE [DISTWIDTH] IN BLOOD BY AUTOMATED COUNT: 16.4 % (ref 10–15)
ERYTHROCYTE [DISTWIDTH] IN BLOOD BY AUTOMATED COUNT: 16.5 % (ref 10–15)
ERYTHROCYTE [DISTWIDTH] IN BLOOD BY AUTOMATED COUNT: 16.5 % (ref 10–15)
ERYTHROCYTE [DISTWIDTH] IN BLOOD BY AUTOMATED COUNT: 16.6 % (ref 10–15)
ERYTHROCYTE [DISTWIDTH] IN BLOOD BY AUTOMATED COUNT: 16.7 % (ref 10–15)
ERYTHROCYTE [DISTWIDTH] IN BLOOD BY AUTOMATED COUNT: 16.8 % (ref 10–15)
ERYTHROCYTE [DISTWIDTH] IN BLOOD BY AUTOMATED COUNT: 16.8 % (ref 10–15)
ERYTHROCYTE [DISTWIDTH] IN BLOOD BY AUTOMATED COUNT: 16.9 % (ref 10–15)
ERYTHROCYTE [DISTWIDTH] IN BLOOD BY AUTOMATED COUNT: 16.9 % (ref 10–15)
ERYTHROCYTE [DISTWIDTH] IN BLOOD BY AUTOMATED COUNT: 17 % (ref 10–15)
ERYTHROCYTE [DISTWIDTH] IN BLOOD BY AUTOMATED COUNT: 17.1 % (ref 10–15)
ERYTHROCYTE [DISTWIDTH] IN BLOOD BY AUTOMATED COUNT: 17.2 % (ref 10–15)
ERYTHROCYTE [DISTWIDTH] IN BLOOD BY AUTOMATED COUNT: 17.3 % (ref 10–15)
ERYTHROCYTE [DISTWIDTH] IN BLOOD BY AUTOMATED COUNT: 17.4 % (ref 10–15)
ERYTHROCYTE [DISTWIDTH] IN BLOOD BY AUTOMATED COUNT: 17.5 % (ref 10–15)
ERYTHROCYTE [DISTWIDTH] IN BLOOD BY AUTOMATED COUNT: 17.6 % (ref 10–15)
ERYTHROCYTE [DISTWIDTH] IN BLOOD BY AUTOMATED COUNT: 17.8 % (ref 10–15)
ERYTHROCYTE [DISTWIDTH] IN BLOOD BY AUTOMATED COUNT: 17.9 % (ref 10–15)
ERYTHROCYTE [SEDIMENTATION RATE] IN BLOOD BY WESTERGREN METHOD: 1 MM/HR (ref 0–20)
ERYTHROCYTE [SEDIMENTATION RATE] IN BLOOD BY WESTERGREN METHOD: 116 MM/HR (ref 0–20)
ERYTHROCYTE [SEDIMENTATION RATE] IN BLOOD BY WESTERGREN METHOD: 77 MM/HR (ref 0–20)
ERYTHROCYTE [SEDIMENTATION RATE] IN BLOOD BY WESTERGREN METHOD: 77 MM/HR (ref 0–20)
ERYTHROCYTE [SEDIMENTATION RATE] IN BLOOD BY WESTERGREN METHOD: 92 MM/HR (ref 0–20)
FERRITIN SERPL-MCNC: 2616 NG/ML (ref 31–409)
FOLATE SERPL-MCNC: 21.9 NG/ML (ref 4.6–34.8)
FRACT EXCRET NA UR+SERPL-RTO: 0.2 %
FRACT EXCRET NA UR+SERPL-RTO: 1.4 %
GAMMA INTERFERON BACKGROUND BLD IA-ACNC: 0.01 IU/ML
GFR SERPL CREATININE-BSD FRML MDRD: 10 ML/MIN/1.73M2
GFR SERPL CREATININE-BSD FRML MDRD: 11 ML/MIN/1.73M2
GFR SERPL CREATININE-BSD FRML MDRD: 12 ML/MIN/1.73M2
GFR SERPL CREATININE-BSD FRML MDRD: 13 ML/MIN/1.73M2
GFR SERPL CREATININE-BSD FRML MDRD: 14 ML/MIN/1.73M2
GFR SERPL CREATININE-BSD FRML MDRD: 14 ML/MIN/1.73M2
GFR SERPL CREATININE-BSD FRML MDRD: 15 ML/MIN/1.73M2
GFR SERPL CREATININE-BSD FRML MDRD: 16 ML/MIN/1.73M2
GFR SERPL CREATININE-BSD FRML MDRD: 17 ML/MIN/1.73M2
GFR SERPL CREATININE-BSD FRML MDRD: 19 ML/MIN/1.73M2
GFR SERPL CREATININE-BSD FRML MDRD: 20 ML/MIN/1.73M2
GFR SERPL CREATININE-BSD FRML MDRD: 22 ML/MIN/1.73M2
GFR SERPL CREATININE-BSD FRML MDRD: 23 ML/MIN/1.73M2
GFR SERPL CREATININE-BSD FRML MDRD: 24 ML/MIN/1.73M2
GFR SERPL CREATININE-BSD FRML MDRD: 27 ML/MIN/1.73M2
GFR SERPL CREATININE-BSD FRML MDRD: 27 ML/MIN/1.73M2
GFR SERPL CREATININE-BSD FRML MDRD: 28 ML/MIN/1.73M2
GFR SERPL CREATININE-BSD FRML MDRD: 28 ML/MIN/1.73M2
GFR SERPL CREATININE-BSD FRML MDRD: 29 ML/MIN/1.73M2
GFR SERPL CREATININE-BSD FRML MDRD: 31 ML/MIN/1.73M2
GFR SERPL CREATININE-BSD FRML MDRD: 32 ML/MIN/1.73M2
GFR SERPL CREATININE-BSD FRML MDRD: 32 ML/MIN/1.73M2
GFR SERPL CREATININE-BSD FRML MDRD: 33 ML/MIN/1.73M2
GFR SERPL CREATININE-BSD FRML MDRD: 34 ML/MIN/1.73M2
GFR SERPL CREATININE-BSD FRML MDRD: 35 ML/MIN/1.73M2
GFR SERPL CREATININE-BSD FRML MDRD: 36 ML/MIN/1.73M2
GFR SERPL CREATININE-BSD FRML MDRD: 44 ML/MIN/1.73M2
GFR SERPL CREATININE-BSD FRML MDRD: 46 ML/MIN/1.73M2
GFR SERPL CREATININE-BSD FRML MDRD: 46 ML/MIN/1.73M2
GFR SERPL CREATININE-BSD FRML MDRD: 53 ML/MIN/1.73M2
GFR SERPL CREATININE-BSD FRML MDRD: 54 ML/MIN/1.73M2
GFR SERPL CREATININE-BSD FRML MDRD: 57 ML/MIN/1.73M2
GFR SERPL CREATININE-BSD FRML MDRD: 59 ML/MIN/1.73M2
GFR SERPL CREATININE-BSD FRML MDRD: 62 ML/MIN/1.73M2
GFR SERPL CREATININE-BSD FRML MDRD: 64 ML/MIN/1.73M2
GFR SERPL CREATININE-BSD FRML MDRD: 65 ML/MIN/1.73M2
GFR SERPL CREATININE-BSD FRML MDRD: 65 ML/MIN/1.73M2
GFR SERPL CREATININE-BSD FRML MDRD: 68 ML/MIN/1.73M2
GFR SERPL CREATININE-BSD FRML MDRD: 68 ML/MIN/1.73M2
GFR SERPL CREATININE-BSD FRML MDRD: 69 ML/MIN/1.73M2
GFR SERPL CREATININE-BSD FRML MDRD: 71 ML/MIN/1.73M2
GFR SERPL CREATININE-BSD FRML MDRD: 72 ML/MIN/1.73M2
GFR SERPL CREATININE-BSD FRML MDRD: 72 ML/MIN/1.73M2
GFR SERPL CREATININE-BSD FRML MDRD: 73 ML/MIN/1.73M2
GFR SERPL CREATININE-BSD FRML MDRD: 73 ML/MIN/1.73M2
GFR SERPL CREATININE-BSD FRML MDRD: 74 ML/MIN/1.73M2
GFR SERPL CREATININE-BSD FRML MDRD: 75 ML/MIN/1.73M2
GFR SERPL CREATININE-BSD FRML MDRD: 76 ML/MIN/1.73M2
GFR SERPL CREATININE-BSD FRML MDRD: 76 ML/MIN/1.73M2
GFR SERPL CREATININE-BSD FRML MDRD: 77 ML/MIN/1.73M2
GFR SERPL CREATININE-BSD FRML MDRD: 80 ML/MIN/1.73M2
GFR SERPL CREATININE-BSD FRML MDRD: 81 ML/MIN/1.73M2
GFR SERPL CREATININE-BSD FRML MDRD: 81 ML/MIN/1.73M2
GFR SERPL CREATININE-BSD FRML MDRD: 82 ML/MIN/1.73M2
GFR SERPL CREATININE-BSD FRML MDRD: 83 ML/MIN/1.73M2
GFR SERPL CREATININE-BSD FRML MDRD: 85 ML/MIN/1.73M2
GFR SERPL CREATININE-BSD FRML MDRD: 85 ML/MIN/1.73M2
GFR SERPL CREATININE-BSD FRML MDRD: 87 ML/MIN/1.73M2
GFR SERPL CREATININE-BSD FRML MDRD: 88 ML/MIN/1.73M2
GFR SERPL CREATININE-BSD FRML MDRD: 9 ML/MIN/1.73M2
GFR SERPL CREATININE-BSD FRML MDRD: >90 ML/MIN/1.73M2
GLUCOSE BLD-MCNC: 101 MG/DL (ref 70–99)
GLUCOSE BLD-MCNC: 105 MG/DL (ref 70–99)
GLUCOSE BLD-MCNC: 119 MG/DL (ref 70–99)
GLUCOSE BLD-MCNC: 124 MG/DL (ref 70–99)
GLUCOSE BLD-MCNC: 124 MG/DL (ref 70–99)
GLUCOSE BLD-MCNC: 85 MG/DL (ref 70–99)
GLUCOSE BLD-MCNC: 96 MG/DL (ref 70–99)
GLUCOSE BLD-MCNC: 96 MG/DL (ref 70–99)
GLUCOSE BLD-MCNC: 98 MG/DL (ref 70–99)
GLUCOSE BLD-MCNC: 99 MG/DL (ref 70–99)
GLUCOSE BLD-MCNC: 99 MG/DL (ref 70–99)
GLUCOSE BLDC GLUCOMTR-MCNC: 100 MG/DL (ref 70–99)
GLUCOSE BLDC GLUCOMTR-MCNC: 101 MG/DL (ref 70–99)
GLUCOSE BLDC GLUCOMTR-MCNC: 102 MG/DL (ref 70–99)
GLUCOSE BLDC GLUCOMTR-MCNC: 103 MG/DL (ref 70–99)
GLUCOSE BLDC GLUCOMTR-MCNC: 104 MG/DL (ref 70–99)
GLUCOSE BLDC GLUCOMTR-MCNC: 105 MG/DL (ref 70–99)
GLUCOSE BLDC GLUCOMTR-MCNC: 106 MG/DL (ref 70–99)
GLUCOSE BLDC GLUCOMTR-MCNC: 107 MG/DL (ref 70–99)
GLUCOSE BLDC GLUCOMTR-MCNC: 108 MG/DL (ref 70–99)
GLUCOSE BLDC GLUCOMTR-MCNC: 109 MG/DL (ref 70–99)
GLUCOSE BLDC GLUCOMTR-MCNC: 110 MG/DL (ref 70–99)
GLUCOSE BLDC GLUCOMTR-MCNC: 111 MG/DL (ref 70–99)
GLUCOSE BLDC GLUCOMTR-MCNC: 112 MG/DL (ref 70–99)
GLUCOSE BLDC GLUCOMTR-MCNC: 113 MG/DL (ref 70–99)
GLUCOSE BLDC GLUCOMTR-MCNC: 114 MG/DL (ref 70–99)
GLUCOSE BLDC GLUCOMTR-MCNC: 115 MG/DL (ref 70–99)
GLUCOSE BLDC GLUCOMTR-MCNC: 116 MG/DL (ref 70–99)
GLUCOSE BLDC GLUCOMTR-MCNC: 117 MG/DL (ref 70–99)
GLUCOSE BLDC GLUCOMTR-MCNC: 118 MG/DL (ref 70–99)
GLUCOSE BLDC GLUCOMTR-MCNC: 119 MG/DL (ref 70–99)
GLUCOSE BLDC GLUCOMTR-MCNC: 120 MG/DL (ref 70–99)
GLUCOSE BLDC GLUCOMTR-MCNC: 121 MG/DL (ref 70–99)
GLUCOSE BLDC GLUCOMTR-MCNC: 122 MG/DL (ref 70–99)
GLUCOSE BLDC GLUCOMTR-MCNC: 123 MG/DL (ref 70–99)
GLUCOSE BLDC GLUCOMTR-MCNC: 124 MG/DL (ref 70–99)
GLUCOSE BLDC GLUCOMTR-MCNC: 125 MG/DL (ref 70–99)
GLUCOSE BLDC GLUCOMTR-MCNC: 126 MG/DL (ref 70–99)
GLUCOSE BLDC GLUCOMTR-MCNC: 127 MG/DL (ref 70–99)
GLUCOSE BLDC GLUCOMTR-MCNC: 128 MG/DL (ref 70–99)
GLUCOSE BLDC GLUCOMTR-MCNC: 129 MG/DL (ref 70–99)
GLUCOSE BLDC GLUCOMTR-MCNC: 130 MG/DL (ref 70–99)
GLUCOSE BLDC GLUCOMTR-MCNC: 131 MG/DL (ref 70–99)
GLUCOSE BLDC GLUCOMTR-MCNC: 132 MG/DL (ref 70–99)
GLUCOSE BLDC GLUCOMTR-MCNC: 133 MG/DL (ref 70–99)
GLUCOSE BLDC GLUCOMTR-MCNC: 134 MG/DL (ref 70–99)
GLUCOSE BLDC GLUCOMTR-MCNC: 135 MG/DL (ref 70–99)
GLUCOSE BLDC GLUCOMTR-MCNC: 136 MG/DL (ref 70–99)
GLUCOSE BLDC GLUCOMTR-MCNC: 137 MG/DL (ref 70–99)
GLUCOSE BLDC GLUCOMTR-MCNC: 138 MG/DL (ref 70–99)
GLUCOSE BLDC GLUCOMTR-MCNC: 139 MG/DL (ref 70–99)
GLUCOSE BLDC GLUCOMTR-MCNC: 140 MG/DL (ref 70–99)
GLUCOSE BLDC GLUCOMTR-MCNC: 141 MG/DL (ref 70–99)
GLUCOSE BLDC GLUCOMTR-MCNC: 142 MG/DL (ref 70–99)
GLUCOSE BLDC GLUCOMTR-MCNC: 143 MG/DL (ref 70–99)
GLUCOSE BLDC GLUCOMTR-MCNC: 144 MG/DL (ref 70–99)
GLUCOSE BLDC GLUCOMTR-MCNC: 145 MG/DL (ref 70–99)
GLUCOSE BLDC GLUCOMTR-MCNC: 146 MG/DL (ref 70–99)
GLUCOSE BLDC GLUCOMTR-MCNC: 146 MG/DL (ref 70–99)
GLUCOSE BLDC GLUCOMTR-MCNC: 147 MG/DL (ref 70–99)
GLUCOSE BLDC GLUCOMTR-MCNC: 147 MG/DL (ref 70–99)
GLUCOSE BLDC GLUCOMTR-MCNC: 148 MG/DL (ref 70–99)
GLUCOSE BLDC GLUCOMTR-MCNC: 149 MG/DL (ref 70–99)
GLUCOSE BLDC GLUCOMTR-MCNC: 149 MG/DL (ref 70–99)
GLUCOSE BLDC GLUCOMTR-MCNC: 150 MG/DL (ref 70–99)
GLUCOSE BLDC GLUCOMTR-MCNC: 151 MG/DL (ref 70–99)
GLUCOSE BLDC GLUCOMTR-MCNC: 152 MG/DL (ref 70–99)
GLUCOSE BLDC GLUCOMTR-MCNC: 153 MG/DL (ref 70–99)
GLUCOSE BLDC GLUCOMTR-MCNC: 153 MG/DL (ref 70–99)
GLUCOSE BLDC GLUCOMTR-MCNC: 154 MG/DL (ref 70–99)
GLUCOSE BLDC GLUCOMTR-MCNC: 155 MG/DL (ref 70–99)
GLUCOSE BLDC GLUCOMTR-MCNC: 156 MG/DL (ref 70–99)
GLUCOSE BLDC GLUCOMTR-MCNC: 157 MG/DL (ref 70–99)
GLUCOSE BLDC GLUCOMTR-MCNC: 158 MG/DL (ref 70–99)
GLUCOSE BLDC GLUCOMTR-MCNC: 159 MG/DL (ref 70–99)
GLUCOSE BLDC GLUCOMTR-MCNC: 160 MG/DL (ref 70–99)
GLUCOSE BLDC GLUCOMTR-MCNC: 161 MG/DL (ref 70–99)
GLUCOSE BLDC GLUCOMTR-MCNC: 162 MG/DL (ref 70–99)
GLUCOSE BLDC GLUCOMTR-MCNC: 163 MG/DL (ref 70–99)
GLUCOSE BLDC GLUCOMTR-MCNC: 163 MG/DL (ref 70–99)
GLUCOSE BLDC GLUCOMTR-MCNC: 164 MG/DL (ref 70–99)
GLUCOSE BLDC GLUCOMTR-MCNC: 165 MG/DL (ref 70–99)
GLUCOSE BLDC GLUCOMTR-MCNC: 166 MG/DL (ref 70–99)
GLUCOSE BLDC GLUCOMTR-MCNC: 166 MG/DL (ref 70–99)
GLUCOSE BLDC GLUCOMTR-MCNC: 167 MG/DL (ref 70–99)
GLUCOSE BLDC GLUCOMTR-MCNC: 168 MG/DL (ref 70–99)
GLUCOSE BLDC GLUCOMTR-MCNC: 169 MG/DL (ref 70–99)
GLUCOSE BLDC GLUCOMTR-MCNC: 170 MG/DL (ref 70–99)
GLUCOSE BLDC GLUCOMTR-MCNC: 170 MG/DL (ref 70–99)
GLUCOSE BLDC GLUCOMTR-MCNC: 171 MG/DL (ref 70–99)
GLUCOSE BLDC GLUCOMTR-MCNC: 171 MG/DL (ref 70–99)
GLUCOSE BLDC GLUCOMTR-MCNC: 172 MG/DL (ref 70–99)
GLUCOSE BLDC GLUCOMTR-MCNC: 173 MG/DL (ref 70–99)
GLUCOSE BLDC GLUCOMTR-MCNC: 173 MG/DL (ref 70–99)
GLUCOSE BLDC GLUCOMTR-MCNC: 174 MG/DL (ref 70–99)
GLUCOSE BLDC GLUCOMTR-MCNC: 175 MG/DL (ref 70–99)
GLUCOSE BLDC GLUCOMTR-MCNC: 176 MG/DL (ref 70–99)
GLUCOSE BLDC GLUCOMTR-MCNC: 177 MG/DL (ref 70–99)
GLUCOSE BLDC GLUCOMTR-MCNC: 177 MG/DL (ref 70–99)
GLUCOSE BLDC GLUCOMTR-MCNC: 178 MG/DL (ref 70–99)
GLUCOSE BLDC GLUCOMTR-MCNC: 180 MG/DL (ref 70–99)
GLUCOSE BLDC GLUCOMTR-MCNC: 181 MG/DL (ref 70–99)
GLUCOSE BLDC GLUCOMTR-MCNC: 183 MG/DL (ref 70–99)
GLUCOSE BLDC GLUCOMTR-MCNC: 184 MG/DL (ref 70–99)
GLUCOSE BLDC GLUCOMTR-MCNC: 184 MG/DL (ref 70–99)
GLUCOSE BLDC GLUCOMTR-MCNC: 187 MG/DL (ref 70–99)
GLUCOSE BLDC GLUCOMTR-MCNC: 188 MG/DL (ref 70–99)
GLUCOSE BLDC GLUCOMTR-MCNC: 188 MG/DL (ref 70–99)
GLUCOSE BLDC GLUCOMTR-MCNC: 190 MG/DL (ref 70–99)
GLUCOSE BLDC GLUCOMTR-MCNC: 193 MG/DL (ref 70–99)
GLUCOSE BLDC GLUCOMTR-MCNC: 194 MG/DL (ref 70–99)
GLUCOSE BLDC GLUCOMTR-MCNC: 196 MG/DL (ref 70–99)
GLUCOSE BLDC GLUCOMTR-MCNC: 196 MG/DL (ref 70–99)
GLUCOSE BLDC GLUCOMTR-MCNC: 203 MG/DL (ref 70–99)
GLUCOSE BLDC GLUCOMTR-MCNC: 204 MG/DL (ref 70–99)
GLUCOSE BLDC GLUCOMTR-MCNC: 207 MG/DL (ref 70–99)
GLUCOSE BLDC GLUCOMTR-MCNC: 209 MG/DL (ref 70–99)
GLUCOSE BLDC GLUCOMTR-MCNC: 222 MG/DL (ref 70–99)
GLUCOSE BLDC GLUCOMTR-MCNC: 223 MG/DL (ref 70–99)
GLUCOSE BLDC GLUCOMTR-MCNC: 231 MG/DL (ref 70–99)
GLUCOSE BLDC GLUCOMTR-MCNC: 237 MG/DL (ref 70–99)
GLUCOSE BLDC GLUCOMTR-MCNC: 240 MG/DL (ref 70–99)
GLUCOSE BLDC GLUCOMTR-MCNC: 75 MG/DL (ref 70–99)
GLUCOSE BLDC GLUCOMTR-MCNC: 76 MG/DL (ref 70–99)
GLUCOSE BLDC GLUCOMTR-MCNC: 77 MG/DL (ref 70–99)
GLUCOSE BLDC GLUCOMTR-MCNC: 77 MG/DL (ref 70–99)
GLUCOSE BLDC GLUCOMTR-MCNC: 78 MG/DL (ref 70–99)
GLUCOSE BLDC GLUCOMTR-MCNC: 79 MG/DL (ref 70–99)
GLUCOSE BLDC GLUCOMTR-MCNC: 80 MG/DL (ref 70–99)
GLUCOSE BLDC GLUCOMTR-MCNC: 80 MG/DL (ref 70–99)
GLUCOSE BLDC GLUCOMTR-MCNC: 81 MG/DL (ref 70–99)
GLUCOSE BLDC GLUCOMTR-MCNC: 82 MG/DL (ref 70–99)
GLUCOSE BLDC GLUCOMTR-MCNC: 83 MG/DL (ref 70–99)
GLUCOSE BLDC GLUCOMTR-MCNC: 84 MG/DL (ref 70–99)
GLUCOSE BLDC GLUCOMTR-MCNC: 85 MG/DL (ref 70–99)
GLUCOSE BLDC GLUCOMTR-MCNC: 86 MG/DL (ref 70–99)
GLUCOSE BLDC GLUCOMTR-MCNC: 87 MG/DL (ref 70–99)
GLUCOSE BLDC GLUCOMTR-MCNC: 88 MG/DL (ref 70–99)
GLUCOSE BLDC GLUCOMTR-MCNC: 89 MG/DL (ref 70–99)
GLUCOSE BLDC GLUCOMTR-MCNC: 90 MG/DL (ref 70–99)
GLUCOSE BLDC GLUCOMTR-MCNC: 91 MG/DL (ref 70–99)
GLUCOSE BLDC GLUCOMTR-MCNC: 92 MG/DL (ref 70–99)
GLUCOSE BLDC GLUCOMTR-MCNC: 93 MG/DL (ref 70–99)
GLUCOSE BLDC GLUCOMTR-MCNC: 94 MG/DL (ref 70–99)
GLUCOSE BLDC GLUCOMTR-MCNC: 95 MG/DL (ref 70–99)
GLUCOSE BLDC GLUCOMTR-MCNC: 96 MG/DL (ref 70–99)
GLUCOSE BLDC GLUCOMTR-MCNC: 97 MG/DL (ref 70–99)
GLUCOSE BLDC GLUCOMTR-MCNC: 98 MG/DL (ref 70–99)
GLUCOSE BLDC GLUCOMTR-MCNC: 99 MG/DL (ref 70–99)
GLUCOSE SERPL-MCNC: 100 MG/DL (ref 70–99)
GLUCOSE SERPL-MCNC: 100 MG/DL (ref 70–99)
GLUCOSE SERPL-MCNC: 101 MG/DL (ref 70–99)
GLUCOSE SERPL-MCNC: 102 MG/DL (ref 70–99)
GLUCOSE SERPL-MCNC: 102 MG/DL (ref 70–99)
GLUCOSE SERPL-MCNC: 103 MG/DL (ref 70–99)
GLUCOSE SERPL-MCNC: 104 MG/DL (ref 70–99)
GLUCOSE SERPL-MCNC: 104 MG/DL (ref 70–99)
GLUCOSE SERPL-MCNC: 105 MG/DL (ref 70–99)
GLUCOSE SERPL-MCNC: 105 MG/DL (ref 70–99)
GLUCOSE SERPL-MCNC: 106 MG/DL (ref 70–99)
GLUCOSE SERPL-MCNC: 108 MG/DL (ref 70–99)
GLUCOSE SERPL-MCNC: 108 MG/DL (ref 70–99)
GLUCOSE SERPL-MCNC: 109 MG/DL (ref 70–99)
GLUCOSE SERPL-MCNC: 110 MG/DL (ref 70–99)
GLUCOSE SERPL-MCNC: 111 MG/DL (ref 70–99)
GLUCOSE SERPL-MCNC: 112 MG/DL (ref 70–99)
GLUCOSE SERPL-MCNC: 112 MG/DL (ref 70–99)
GLUCOSE SERPL-MCNC: 113 MG/DL (ref 70–99)
GLUCOSE SERPL-MCNC: 113 MG/DL (ref 70–99)
GLUCOSE SERPL-MCNC: 114 MG/DL (ref 70–99)
GLUCOSE SERPL-MCNC: 115 MG/DL (ref 70–99)
GLUCOSE SERPL-MCNC: 116 MG/DL (ref 70–99)
GLUCOSE SERPL-MCNC: 117 MG/DL (ref 70–99)
GLUCOSE SERPL-MCNC: 117 MG/DL (ref 70–99)
GLUCOSE SERPL-MCNC: 118 MG/DL (ref 70–99)
GLUCOSE SERPL-MCNC: 118 MG/DL (ref 70–99)
GLUCOSE SERPL-MCNC: 119 MG/DL (ref 70–99)
GLUCOSE SERPL-MCNC: 120 MG/DL (ref 70–99)
GLUCOSE SERPL-MCNC: 120 MG/DL (ref 70–99)
GLUCOSE SERPL-MCNC: 121 MG/DL (ref 70–99)
GLUCOSE SERPL-MCNC: 125 MG/DL (ref 70–99)
GLUCOSE SERPL-MCNC: 127 MG/DL (ref 70–99)
GLUCOSE SERPL-MCNC: 128 MG/DL (ref 70–99)
GLUCOSE SERPL-MCNC: 129 MG/DL (ref 70–99)
GLUCOSE SERPL-MCNC: 130 MG/DL (ref 70–99)
GLUCOSE SERPL-MCNC: 130 MG/DL (ref 70–99)
GLUCOSE SERPL-MCNC: 132 MG/DL (ref 70–99)
GLUCOSE SERPL-MCNC: 132 MG/DL (ref 70–99)
GLUCOSE SERPL-MCNC: 134 MG/DL (ref 70–99)
GLUCOSE SERPL-MCNC: 135 MG/DL (ref 70–99)
GLUCOSE SERPL-MCNC: 141 MG/DL (ref 70–99)
GLUCOSE SERPL-MCNC: 142 MG/DL (ref 70–99)
GLUCOSE SERPL-MCNC: 146 MG/DL (ref 70–99)
GLUCOSE SERPL-MCNC: 147 MG/DL (ref 70–99)
GLUCOSE SERPL-MCNC: 151 MG/DL (ref 70–99)
GLUCOSE SERPL-MCNC: 155 MG/DL (ref 70–99)
GLUCOSE SERPL-MCNC: 158 MG/DL (ref 70–99)
GLUCOSE SERPL-MCNC: 166 MG/DL (ref 70–99)
GLUCOSE SERPL-MCNC: 171 MG/DL (ref 70–99)
GLUCOSE SERPL-MCNC: 177 MG/DL (ref 70–99)
GLUCOSE SERPL-MCNC: 179 MG/DL (ref 70–99)
GLUCOSE SERPL-MCNC: 186 MG/DL (ref 70–99)
GLUCOSE SERPL-MCNC: 189 MG/DL (ref 70–99)
GLUCOSE SERPL-MCNC: 189 MG/DL (ref 70–99)
GLUCOSE SERPL-MCNC: 81 MG/DL (ref 70–99)
GLUCOSE SERPL-MCNC: 83 MG/DL (ref 70–99)
GLUCOSE SERPL-MCNC: 83 MG/DL (ref 70–99)
GLUCOSE SERPL-MCNC: 85 MG/DL (ref 70–99)
GLUCOSE SERPL-MCNC: 86 MG/DL (ref 70–99)
GLUCOSE SERPL-MCNC: 86 MG/DL (ref 70–99)
GLUCOSE SERPL-MCNC: 87 MG/DL (ref 70–99)
GLUCOSE SERPL-MCNC: 90 MG/DL (ref 70–99)
GLUCOSE SERPL-MCNC: 91 MG/DL (ref 70–99)
GLUCOSE SERPL-MCNC: 92 MG/DL (ref 70–99)
GLUCOSE SERPL-MCNC: 92 MG/DL (ref 70–99)
GLUCOSE SERPL-MCNC: 94 MG/DL (ref 70–99)
GLUCOSE SERPL-MCNC: 95 MG/DL (ref 70–99)
GLUCOSE SERPL-MCNC: 96 MG/DL (ref 70–99)
GLUCOSE SERPL-MCNC: 96 MG/DL (ref 70–99)
GLUCOSE SERPL-MCNC: 97 MG/DL (ref 70–99)
GLUCOSE SERPL-MCNC: 98 MG/DL (ref 70–99)
GLUCOSE SERPL-MCNC: 98 MG/DL (ref 70–99)
GLUCOSE SERPL-MCNC: 99 MG/DL (ref 70–99)
GLUCOSE UR STRIP-MCNC: NEGATIVE MG/DL
GRANULAR CAST: 27 /LPF
GRANULAR CAST: 4 /LPF
HBA1C MFR BLD: 5.6 %
HBA1C MFR BLD: 5.8 %
HCO3 BLD-SCNC: 26 MMOL/L (ref 21–28)
HCO3 BLDV-SCNC: 21 MMOL/L (ref 21–28)
HCO3 BLDV-SCNC: 23 MMOL/L (ref 21–28)
HCT VFR BLD AUTO: 17.6 % (ref 40–53)
HCT VFR BLD AUTO: 21.4 % (ref 40–53)
HCT VFR BLD AUTO: 22.1 % (ref 40–53)
HCT VFR BLD AUTO: 22.2 % (ref 40–53)
HCT VFR BLD AUTO: 22.4 % (ref 40–53)
HCT VFR BLD AUTO: 22.4 % (ref 40–53)
HCT VFR BLD AUTO: 22.7 % (ref 40–53)
HCT VFR BLD AUTO: 23 % (ref 40–53)
HCT VFR BLD AUTO: 23.1 % (ref 40–53)
HCT VFR BLD AUTO: 23.3 % (ref 40–53)
HCT VFR BLD AUTO: 23.5 % (ref 40–53)
HCT VFR BLD AUTO: 23.6 % (ref 40–53)
HCT VFR BLD AUTO: 23.6 % (ref 40–53)
HCT VFR BLD AUTO: 23.7 % (ref 40–53)
HCT VFR BLD AUTO: 23.7 % (ref 40–53)
HCT VFR BLD AUTO: 23.8 % (ref 40–53)
HCT VFR BLD AUTO: 23.9 % (ref 40–53)
HCT VFR BLD AUTO: 23.9 % (ref 40–53)
HCT VFR BLD AUTO: 24 % (ref 40–53)
HCT VFR BLD AUTO: 24 % (ref 40–53)
HCT VFR BLD AUTO: 24.1 % (ref 40–53)
HCT VFR BLD AUTO: 24.3 % (ref 40–53)
HCT VFR BLD AUTO: 24.4 % (ref 40–53)
HCT VFR BLD AUTO: 24.5 % (ref 40–53)
HCT VFR BLD AUTO: 24.5 % (ref 40–53)
HCT VFR BLD AUTO: 24.6 % (ref 40–53)
HCT VFR BLD AUTO: 24.7 % (ref 40–53)
HCT VFR BLD AUTO: 25 % (ref 40–53)
HCT VFR BLD AUTO: 25.2 % (ref 40–53)
HCT VFR BLD AUTO: 25.3 % (ref 40–53)
HCT VFR BLD AUTO: 25.3 % (ref 40–53)
HCT VFR BLD AUTO: 25.4 % (ref 40–53)
HCT VFR BLD AUTO: 25.4 % (ref 40–53)
HCT VFR BLD AUTO: 25.5 % (ref 40–53)
HCT VFR BLD AUTO: 26 % (ref 40–53)
HCT VFR BLD AUTO: 26.2 % (ref 40–53)
HCT VFR BLD AUTO: 26.2 % (ref 40–53)
HCT VFR BLD AUTO: 26.5 % (ref 40–53)
HCT VFR BLD AUTO: 26.8 % (ref 40–53)
HCT VFR BLD AUTO: 26.9 % (ref 40–53)
HCT VFR BLD AUTO: 27 % (ref 40–53)
HCT VFR BLD AUTO: 27.1 % (ref 40–53)
HCT VFR BLD AUTO: 27.1 % (ref 40–53)
HCT VFR BLD AUTO: 27.5 % (ref 40–53)
HCT VFR BLD AUTO: 30.6 % (ref 40–53)
HCT VFR BLD AUTO: 31.9 % (ref 40–53)
HCT VFR BLD AUTO: 32.2 % (ref 40–53)
HCT VFR BLD AUTO: 32.5 % (ref 40–53)
HCT VFR BLD AUTO: 32.6 % (ref 40–53)
HCT VFR BLD AUTO: 32.9 % (ref 40–53)
HCT VFR BLD AUTO: 32.9 % (ref 40–53)
HCT VFR BLD AUTO: 33.6 % (ref 40–53)
HCT VFR BLD AUTO: 33.8 % (ref 40–53)
HCT VFR BLD AUTO: 34.1 % (ref 40–53)
HCT VFR BLD AUTO: 34.2 % (ref 40–53)
HCT VFR BLD AUTO: 34.3 % (ref 40–53)
HCT VFR BLD AUTO: 34.6 % (ref 40–53)
HCT VFR BLD AUTO: 34.7 % (ref 40–53)
HCT VFR BLD AUTO: 34.7 % (ref 40–53)
HCT VFR BLD AUTO: 34.8 % (ref 40–53)
HCT VFR BLD AUTO: 35.1 % (ref 40–53)
HCT VFR BLD AUTO: 35.3 % (ref 40–53)
HCT VFR BLD AUTO: 35.7 % (ref 40–53)
HCT VFR BLD AUTO: 35.9 % (ref 40–53)
HCT VFR BLD AUTO: 36.2 % (ref 40–53)
HCT VFR BLD AUTO: 36.3 % (ref 40–53)
HCT VFR BLD AUTO: 36.4 % (ref 40–53)
HCT VFR BLD AUTO: 37 % (ref 40–53)
HCT VFR BLD AUTO: 37.2 % (ref 40–53)
HCT VFR BLD AUTO: 38 % (ref 40–53)
HCT VFR BLD AUTO: 38.7 % (ref 40–53)
HDLC SERPL-MCNC: 25 MG/DL
HGB BLD-MCNC: 10.1 G/DL (ref 13.3–17.7)
HGB BLD-MCNC: 10.3 G/DL (ref 13.3–17.7)
HGB BLD-MCNC: 10.4 G/DL (ref 13.3–17.7)
HGB BLD-MCNC: 10.4 G/DL (ref 13.3–17.7)
HGB BLD-MCNC: 10.5 G/DL (ref 13.3–17.7)
HGB BLD-MCNC: 10.5 G/DL (ref 13.3–17.7)
HGB BLD-MCNC: 10.6 G/DL (ref 13.3–17.7)
HGB BLD-MCNC: 10.6 G/DL (ref 13.3–17.7)
HGB BLD-MCNC: 10.7 G/DL (ref 13.3–17.7)
HGB BLD-MCNC: 10.7 G/DL (ref 13.3–17.7)
HGB BLD-MCNC: 10.8 G/DL (ref 13.3–17.7)
HGB BLD-MCNC: 10.8 G/DL (ref 13.3–17.7)
HGB BLD-MCNC: 10.9 G/DL (ref 13.3–17.7)
HGB BLD-MCNC: 11 G/DL (ref 13.3–17.7)
HGB BLD-MCNC: 11 G/DL (ref 13.3–17.7)
HGB BLD-MCNC: 11.1 G/DL (ref 13.3–17.7)
HGB BLD-MCNC: 11.1 G/DL (ref 13.3–17.7)
HGB BLD-MCNC: 11.2 G/DL (ref 13.3–17.7)
HGB BLD-MCNC: 11.2 G/DL (ref 13.3–17.7)
HGB BLD-MCNC: 11.3 G/DL (ref 13.3–17.7)
HGB BLD-MCNC: 11.4 G/DL (ref 13.3–17.7)
HGB BLD-MCNC: 11.6 G/DL (ref 13.3–17.7)
HGB BLD-MCNC: 12 G/DL (ref 13.3–17.7)
HGB BLD-MCNC: 12.2 G/DL (ref 13.3–17.7)
HGB BLD-MCNC: 5.3 G/DL (ref 13.3–17.7)
HGB BLD-MCNC: 6.7 G/DL (ref 13.3–17.7)
HGB BLD-MCNC: 6.8 G/DL (ref 13.3–17.7)
HGB BLD-MCNC: 6.9 G/DL (ref 13.3–17.7)
HGB BLD-MCNC: 7 G/DL (ref 13.3–17.7)
HGB BLD-MCNC: 7 G/DL (ref 13.3–17.7)
HGB BLD-MCNC: 7.1 G/DL (ref 13.3–17.7)
HGB BLD-MCNC: 7.1 G/DL (ref 13.3–17.7)
HGB BLD-MCNC: 7.2 G/DL (ref 13.3–17.7)
HGB BLD-MCNC: 7.2 G/DL (ref 13.3–17.7)
HGB BLD-MCNC: 7.3 G/DL (ref 13.3–17.7)
HGB BLD-MCNC: 7.4 G/DL (ref 13.3–17.7)
HGB BLD-MCNC: 7.5 G/DL (ref 13.3–17.7)
HGB BLD-MCNC: 7.6 G/DL (ref 13.3–17.7)
HGB BLD-MCNC: 7.7 G/DL (ref 13.3–17.7)
HGB BLD-MCNC: 7.7 G/DL (ref 13.3–17.7)
HGB BLD-MCNC: 7.8 G/DL (ref 13.3–17.7)
HGB BLD-MCNC: 7.8 G/DL (ref 13.3–17.7)
HGB BLD-MCNC: 7.9 G/DL (ref 13.3–17.7)
HGB BLD-MCNC: 8 G/DL (ref 13.3–17.7)
HGB BLD-MCNC: 8.1 G/DL (ref 13.3–17.7)
HGB BLD-MCNC: 8.1 G/DL (ref 13.3–17.7)
HGB BLD-MCNC: 8.2 G/DL (ref 13.3–17.7)
HGB BLD-MCNC: 8.2 G/DL (ref 13.3–17.7)
HGB BLD-MCNC: 8.3 G/DL (ref 13.3–17.7)
HGB BLD-MCNC: 8.4 G/DL (ref 13.3–17.7)
HGB BLD-MCNC: 8.5 G/DL (ref 13.3–17.7)
HGB BLD-MCNC: 8.5 G/DL (ref 13.3–17.7)
HGB BLD-MCNC: 8.8 G/DL (ref 13.3–17.7)
HGB BLD-MCNC: 8.8 G/DL (ref 13.3–17.7)
HGB BLD-MCNC: 9.8 G/DL (ref 13.3–17.7)
HGB BLD-MCNC: 9.9 G/DL (ref 13.3–17.7)
HGB UR QL STRIP: ABNORMAL
HGB UR QL STRIP: ABNORMAL
HGB UR QL STRIP: NEGATIVE
HGB UR QL STRIP: NEGATIVE
HOLD SPECIMEN: NORMAL
HYALINE CASTS: 9 /LPF
IMM GRANULOCYTES # BLD: 0 10E3/UL
IMM GRANULOCYTES # BLD: 0.1 10E3/UL
IMM GRANULOCYTES # BLD: 0.2 10E3/UL
IMM GRANULOCYTES # BLD: 0.4 10E3/UL
IMM GRANULOCYTES NFR BLD: 0 %
IMM GRANULOCYTES NFR BLD: 1 %
IMM GRANULOCYTES NFR BLD: 4 %
INR PPP: 1.34 (ref 0.85–1.15)
INTERPRETATION ECG - MUSE: NORMAL
IRON BINDING CAPACITY (ROCHE): 146 UG/DL (ref 240–430)
IRON BINDING CAPACITY (ROCHE): 149 UG/DL (ref 240–430)
IRON SATN MFR SERPL: 21 % (ref 15–46)
IRON SATN MFR SERPL: 26 % (ref 15–46)
IRON SERPL-MCNC: 30 UG/DL (ref 61–157)
IRON SERPL-MCNC: 38 UG/DL (ref 61–157)
ISSUE DATE AND TIME: NORMAL
KETONES UR STRIP-MCNC: ABNORMAL MG/DL
KETONES UR STRIP-MCNC: NEGATIVE MG/DL
LACTATE SERPL-SCNC: 0.6 MMOL/L (ref 0.7–2)
LACTATE SERPL-SCNC: 0.7 MMOL/L (ref 0.7–2)
LACTATE SERPL-SCNC: 0.9 MMOL/L (ref 0.7–2)
LACTATE SERPL-SCNC: 1.3 MMOL/L (ref 0.7–2)
LDLC SERPL CALC-MCNC: 52 MG/DL
LEUKOCYTE ESTERASE UR QL STRIP: ABNORMAL
LISTERIA SPECIES (DETECTED/NOT DETECTED): NOT DETECTED
LVEF ECHO: NORMAL
LVEF ECHO: NORMAL
LYMPHOCYTES # BLD AUTO: 2 10E3/UL (ref 0.8–5.3)
LYMPHOCYTES # BLD AUTO: 2 10E3/UL (ref 0.8–5.3)
LYMPHOCYTES # BLD AUTO: 2.2 10E3/UL (ref 0.8–5.3)
LYMPHOCYTES # BLD AUTO: 2.4 10E3/UL (ref 0.8–5.3)
LYMPHOCYTES # BLD AUTO: 2.4 10E3/UL (ref 0.8–5.3)
LYMPHOCYTES # BLD AUTO: 2.5 10E3/UL (ref 0.8–5.3)
LYMPHOCYTES # BLD AUTO: 2.6 10E3/UL (ref 0.8–5.3)
LYMPHOCYTES # BLD AUTO: 2.7 10E3/UL (ref 0.8–5.3)
LYMPHOCYTES # BLD AUTO: 2.8 10E3/UL (ref 0.8–5.3)
LYMPHOCYTES # BLD AUTO: 2.8 10E3/UL (ref 0.8–5.3)
LYMPHOCYTES # BLD AUTO: 2.9 10E3/UL (ref 0.8–5.3)
LYMPHOCYTES # BLD AUTO: 3 10E3/UL (ref 0.8–5.3)
LYMPHOCYTES # BLD AUTO: 3.3 10E3/UL (ref 0.8–5.3)
LYMPHOCYTES # BLD AUTO: 3.8 10E3/UL (ref 0.8–5.3)
LYMPHOCYTES # BLD AUTO: 4.1 10E3/UL (ref 0.8–5.3)
LYMPHOCYTES # BLD AUTO: 4.2 10E3/UL (ref 0.8–5.3)
LYMPHOCYTES NFR BLD AUTO: 16 %
LYMPHOCYTES NFR BLD AUTO: 17 %
LYMPHOCYTES NFR BLD AUTO: 17 %
LYMPHOCYTES NFR BLD AUTO: 19 %
LYMPHOCYTES NFR BLD AUTO: 20 %
LYMPHOCYTES NFR BLD AUTO: 31 %
LYMPHOCYTES NFR BLD AUTO: 32 %
LYMPHOCYTES NFR BLD AUTO: 35 %
LYMPHOCYTES NFR BLD AUTO: 37 %
LYMPHOCYTES NFR BLD AUTO: 37 %
LYMPHOCYTES NFR BLD AUTO: 38 %
LYMPHOCYTES NFR BLD AUTO: 39 %
LYMPHOCYTES NFR BLD AUTO: 39 %
LYMPHOCYTES NFR BLD AUTO: 40 %
LYMPHOCYTES NFR BLD AUTO: 42 %
LYMPHOCYTES NFR BLD AUTO: 45 %
LYMPHOCYTES NFR FLD MANUAL: 22 %
M TB IFN-G BLD-IMP: NEGATIVE
M TB IFN-G CD4+ BCKGRND COR BLD-ACNC: 9.99 IU/ML
MAGNESIUM SERPL-MCNC: 1.2 MG/DL (ref 1.7–2.3)
MAGNESIUM SERPL-MCNC: 1.2 MG/DL (ref 1.7–2.3)
MAGNESIUM SERPL-MCNC: 1.3 MG/DL (ref 1.7–2.3)
MAGNESIUM SERPL-MCNC: 1.4 MG/DL (ref 1.7–2.3)
MAGNESIUM SERPL-MCNC: 1.5 MG/DL (ref 1.7–2.3)
MAGNESIUM SERPL-MCNC: 1.6 MG/DL (ref 1.7–2.3)
MAGNESIUM SERPL-MCNC: 1.7 MG/DL (ref 1.7–2.3)
MAGNESIUM SERPL-MCNC: 1.8 MG/DL (ref 1.7–2.3)
MAGNESIUM SERPL-MCNC: 1.9 MG/DL (ref 1.7–2.3)
MAGNESIUM SERPL-MCNC: 2 MG/DL (ref 1.7–2.3)
MAGNESIUM SERPL-MCNC: 2.1 MG/DL (ref 1.7–2.3)
MAGNESIUM SERPL-MCNC: 2.2 MG/DL (ref 1.7–2.3)
MAGNESIUM SERPL-MCNC: 2.5 MG/DL (ref 1.7–2.3)
MCH RBC QN AUTO: 27.9 PG (ref 26.5–33)
MCH RBC QN AUTO: 27.9 PG (ref 26.5–33)
MCH RBC QN AUTO: 28 PG (ref 26.5–33)
MCH RBC QN AUTO: 28.1 PG (ref 26.5–33)
MCH RBC QN AUTO: 28.1 PG (ref 26.5–33)
MCH RBC QN AUTO: 28.2 PG (ref 26.5–33)
MCH RBC QN AUTO: 28.3 PG (ref 26.5–33)
MCH RBC QN AUTO: 28.3 PG (ref 26.5–33)
MCH RBC QN AUTO: 28.4 PG (ref 26.5–33)
MCH RBC QN AUTO: 28.5 PG (ref 26.5–33)
MCH RBC QN AUTO: 28.6 PG (ref 26.5–33)
MCH RBC QN AUTO: 28.7 PG (ref 26.5–33)
MCH RBC QN AUTO: 28.8 PG (ref 26.5–33)
MCH RBC QN AUTO: 28.8 PG (ref 26.5–33)
MCH RBC QN AUTO: 29 PG (ref 26.5–33)
MCH RBC QN AUTO: 29.1 PG (ref 26.5–33)
MCH RBC QN AUTO: 29.2 PG (ref 26.5–33)
MCH RBC QN AUTO: 29.3 PG (ref 26.5–33)
MCH RBC QN AUTO: 29.3 PG (ref 26.5–33)
MCH RBC QN AUTO: 29.4 PG (ref 26.5–33)
MCH RBC QN AUTO: 29.5 PG (ref 26.5–33)
MCH RBC QN AUTO: 29.5 PG (ref 26.5–33)
MCH RBC QN AUTO: 29.6 PG (ref 26.5–33)
MCH RBC QN AUTO: 29.7 PG (ref 26.5–33)
MCH RBC QN AUTO: 29.8 PG (ref 26.5–33)
MCH RBC QN AUTO: 29.9 PG (ref 26.5–33)
MCH RBC QN AUTO: 29.9 PG (ref 26.5–33)
MCH RBC QN AUTO: 30 PG (ref 26.5–33)
MCH RBC QN AUTO: 30.1 PG (ref 26.5–33)
MCH RBC QN AUTO: 30.2 PG (ref 26.5–33)
MCH RBC QN AUTO: 30.3 PG (ref 26.5–33)
MCH RBC QN AUTO: 30.4 PG (ref 26.5–33)
MCH RBC QN AUTO: 30.5 PG (ref 26.5–33)
MCH RBC QN AUTO: 30.5 PG (ref 26.5–33)
MCH RBC QN AUTO: 30.6 PG (ref 26.5–33)
MCH RBC QN AUTO: 30.6 PG (ref 26.5–33)
MCH RBC QN AUTO: 30.7 PG (ref 26.5–33)
MCH RBC QN AUTO: 31 PG (ref 26.5–33)
MCH RBC QN AUTO: 31.1 PG (ref 26.5–33)
MCHC RBC AUTO-ENTMCNC: 29.2 G/DL (ref 31.5–36.5)
MCHC RBC AUTO-ENTMCNC: 29.6 G/DL (ref 31.5–36.5)
MCHC RBC AUTO-ENTMCNC: 29.6 G/DL (ref 31.5–36.5)
MCHC RBC AUTO-ENTMCNC: 30.1 G/DL (ref 31.5–36.5)
MCHC RBC AUTO-ENTMCNC: 30.3 G/DL (ref 31.5–36.5)
MCHC RBC AUTO-ENTMCNC: 30.3 G/DL (ref 31.5–36.5)
MCHC RBC AUTO-ENTMCNC: 30.4 G/DL (ref 31.5–36.5)
MCHC RBC AUTO-ENTMCNC: 30.5 G/DL (ref 31.5–36.5)
MCHC RBC AUTO-ENTMCNC: 30.5 G/DL (ref 31.5–36.5)
MCHC RBC AUTO-ENTMCNC: 30.6 G/DL (ref 31.5–36.5)
MCHC RBC AUTO-ENTMCNC: 30.6 G/DL (ref 31.5–36.5)
MCHC RBC AUTO-ENTMCNC: 30.7 G/DL (ref 31.5–36.5)
MCHC RBC AUTO-ENTMCNC: 30.8 G/DL (ref 31.5–36.5)
MCHC RBC AUTO-ENTMCNC: 30.8 G/DL (ref 31.5–36.5)
MCHC RBC AUTO-ENTMCNC: 30.9 G/DL (ref 31.5–36.5)
MCHC RBC AUTO-ENTMCNC: 31 G/DL (ref 31.5–36.5)
MCHC RBC AUTO-ENTMCNC: 31 G/DL (ref 31.5–36.5)
MCHC RBC AUTO-ENTMCNC: 31.1 G/DL (ref 31.5–36.5)
MCHC RBC AUTO-ENTMCNC: 31.2 G/DL (ref 31.5–36.5)
MCHC RBC AUTO-ENTMCNC: 31.2 G/DL (ref 31.5–36.5)
MCHC RBC AUTO-ENTMCNC: 31.3 G/DL (ref 31.5–36.5)
MCHC RBC AUTO-ENTMCNC: 31.4 G/DL (ref 31.5–36.5)
MCHC RBC AUTO-ENTMCNC: 31.5 G/DL (ref 31.5–36.5)
MCHC RBC AUTO-ENTMCNC: 31.6 G/DL (ref 31.5–36.5)
MCHC RBC AUTO-ENTMCNC: 31.7 G/DL (ref 31.5–36.5)
MCHC RBC AUTO-ENTMCNC: 31.8 G/DL (ref 31.5–36.5)
MCHC RBC AUTO-ENTMCNC: 31.9 G/DL (ref 31.5–36.5)
MCHC RBC AUTO-ENTMCNC: 31.9 G/DL (ref 31.5–36.5)
MCHC RBC AUTO-ENTMCNC: 32 G/DL (ref 31.5–36.5)
MCHC RBC AUTO-ENTMCNC: 32.1 G/DL (ref 31.5–36.5)
MCHC RBC AUTO-ENTMCNC: 32.2 G/DL (ref 31.5–36.5)
MCHC RBC AUTO-ENTMCNC: 32.2 G/DL (ref 31.5–36.5)
MCHC RBC AUTO-ENTMCNC: 32.3 G/DL (ref 31.5–36.5)
MCHC RBC AUTO-ENTMCNC: 32.4 G/DL (ref 31.5–36.5)
MCHC RBC AUTO-ENTMCNC: 32.5 G/DL (ref 31.5–36.5)
MCHC RBC AUTO-ENTMCNC: 32.7 G/DL (ref 31.5–36.5)
MCHC RBC AUTO-ENTMCNC: 32.7 G/DL (ref 31.5–36.5)
MCHC RBC AUTO-ENTMCNC: 33.1 G/DL (ref 31.5–36.5)
MCHC RBC AUTO-ENTMCNC: 33.2 G/DL (ref 31.5–36.5)
MCV RBC AUTO: 89 FL (ref 78–100)
MCV RBC AUTO: 89 FL (ref 78–100)
MCV RBC AUTO: 90 FL (ref 78–100)
MCV RBC AUTO: 91 FL (ref 78–100)
MCV RBC AUTO: 92 FL (ref 78–100)
MCV RBC AUTO: 93 FL (ref 78–100)
MCV RBC AUTO: 94 FL (ref 78–100)
MCV RBC AUTO: 95 FL (ref 78–100)
MCV RBC AUTO: 96 FL (ref 78–100)
MCV RBC AUTO: 97 FL (ref 78–100)
MCV RBC AUTO: 98 FL (ref 78–100)
MCV RBC AUTO: 99 FL (ref 78–100)
MICROALBUMIN UR-MCNC: <12 MG/L
MICROALBUMIN/CREAT UR: NORMAL MG/G{CREAT}
MITOGEN IGNF BCKGRD COR BLD-ACNC: 0.03 IU/ML
MITOGEN IGNF BCKGRD COR BLD-ACNC: 0.04 IU/ML
MONOCYTES # BLD AUTO: 0.4 10E3/UL (ref 0–1.3)
MONOCYTES # BLD AUTO: 0.6 10E3/UL (ref 0–1.3)
MONOCYTES # BLD AUTO: 0.7 10E3/UL (ref 0–1.3)
MONOCYTES # BLD AUTO: 0.8 10E3/UL (ref 0–1.3)
MONOCYTES # BLD AUTO: 0.9 10E3/UL (ref 0–1.3)
MONOCYTES # BLD AUTO: 0.9 10E3/UL (ref 0–1.3)
MONOCYTES # BLD AUTO: 1.2 10E3/UL (ref 0–1.3)
MONOCYTES # BLD AUTO: 1.3 10E3/UL (ref 0–1.3)
MONOCYTES NFR BLD AUTO: 10 %
MONOCYTES NFR BLD AUTO: 6 %
MONOCYTES NFR BLD AUTO: 6 %
MONOCYTES NFR BLD AUTO: 7 %
MONOCYTES NFR BLD AUTO: 8 %
MONOCYTES NFR BLD AUTO: 8 %
MONOCYTES NFR BLD AUTO: 9 %
MONOS+MACROS NFR FLD MANUAL: 6 %
MUCOUS THREADS #/AREA URNS LPF: PRESENT /LPF
NEUTROPHILS # BLD AUTO: 10.1 10E3/UL (ref 1.6–8.3)
NEUTROPHILS # BLD AUTO: 10.2 10E3/UL (ref 1.6–8.3)
NEUTROPHILS # BLD AUTO: 2.9 10E3/UL (ref 1.6–8.3)
NEUTROPHILS # BLD AUTO: 3.1 10E3/UL (ref 1.6–8.3)
NEUTROPHILS # BLD AUTO: 3.7 10E3/UL (ref 1.6–8.3)
NEUTROPHILS # BLD AUTO: 3.7 10E3/UL (ref 1.6–8.3)
NEUTROPHILS # BLD AUTO: 3.9 10E3/UL (ref 1.6–8.3)
NEUTROPHILS # BLD AUTO: 4.1 10E3/UL (ref 1.6–8.3)
NEUTROPHILS # BLD AUTO: 4.1 10E3/UL (ref 1.6–8.3)
NEUTROPHILS # BLD AUTO: 4.7 10E3/UL (ref 1.6–8.3)
NEUTROPHILS # BLD AUTO: 4.7 10E3/UL (ref 1.6–8.3)
NEUTROPHILS # BLD AUTO: 5.4 10E3/UL (ref 1.6–8.3)
NEUTROPHILS # BLD AUTO: 5.9 10E3/UL (ref 1.6–8.3)
NEUTROPHILS # BLD AUTO: 6.7 10E3/UL (ref 1.6–8.3)
NEUTROPHILS # BLD AUTO: 8 10E3/UL (ref 1.6–8.3)
NEUTROPHILS # BLD AUTO: 9 10E3/UL (ref 1.6–8.3)
NEUTROPHILS NFR BLD AUTO: 45 %
NEUTROPHILS NFR BLD AUTO: 46 %
NEUTROPHILS NFR BLD AUTO: 46 %
NEUTROPHILS NFR BLD AUTO: 47 %
NEUTROPHILS NFR BLD AUTO: 48 %
NEUTROPHILS NFR BLD AUTO: 48 %
NEUTROPHILS NFR BLD AUTO: 49 %
NEUTROPHILS NFR BLD AUTO: 50 %
NEUTROPHILS NFR BLD AUTO: 51 %
NEUTROPHILS NFR BLD AUTO: 55 %
NEUTROPHILS NFR BLD AUTO: 58 %
NEUTROPHILS NFR BLD AUTO: 65 %
NEUTROPHILS NFR BLD AUTO: 67 %
NEUTROPHILS NFR BLD AUTO: 72 %
NEUTROPHILS NFR BLD AUTO: 74 %
NEUTROPHILS NFR BLD AUTO: 74 %
NEUTS BAND NFR FLD MANUAL: 65 %
NITRATE UR QL: NEGATIVE
NONHDLC SERPL-MCNC: 80 MG/DL
NRBC # BLD AUTO: 0 10E3/UL
NRBC BLD AUTO-RTO: 0 /100
NRBC BLD AUTO-RTO: 1 /100
NRBC BLD AUTO-RTO: 1 /100
NT-PROBNP SERPL-MCNC: 2431 PG/ML (ref 0–900)
NT-PROBNP SERPL-MCNC: 2971 PG/ML (ref 0–900)
O2/TOTAL GAS SETTING VFR VENT: 0 %
O2/TOTAL GAS SETTING VFR VENT: 2 %
O2/TOTAL GAS SETTING VFR VENT: 22 %
OSMOLALITY UR: 235 MMOL/KG (ref 100–1200)
OXYHGB MFR BLD: 95 % (ref 92–100)
P AXIS - MUSE: 33 DEGREES
P AXIS - MUSE: 43 DEGREES
P AXIS - MUSE: 58 DEGREES
P AXIS - MUSE: 60 DEGREES
P AXIS - MUSE: 70 DEGREES
P AXIS - MUSE: 71 DEGREES
P AXIS - MUSE: 72 DEGREES
P AXIS - MUSE: 72 DEGREES
P AXIS - MUSE: 94 DEGREES
P AXIS - MUSE: NORMAL DEGREES
PCO2 BLD: 44 MM HG (ref 35–45)
PCO2 BLDV: 33 MM HG (ref 40–50)
PCO2 BLDV: 37 MM HG (ref 40–50)
PH BLD: 7.39 [PH] (ref 7.35–7.45)
PH BLDV: 7.4 [PH] (ref 7.32–7.43)
PH BLDV: 7.42 [PH] (ref 7.32–7.43)
PH UR STRIP: 5 [PH] (ref 5–7)
PH UR STRIP: 5.5 [PH] (ref 5–7)
PHOSPHATE SERPL-MCNC: 3.3 MG/DL (ref 2.5–4.5)
PHOSPHATE SERPL-MCNC: 3.5 MG/DL (ref 2.5–4.5)
PHOSPHATE SERPL-MCNC: 3.6 MG/DL (ref 2.5–4.5)
PHOSPHATE SERPL-MCNC: 3.7 MG/DL (ref 2.5–4.5)
PHOSPHATE SERPL-MCNC: 3.8 MG/DL (ref 2.5–4.5)
PHOSPHATE SERPL-MCNC: 3.9 MG/DL (ref 2.5–4.5)
PHOSPHATE SERPL-MCNC: 4.1 MG/DL (ref 2.5–4.5)
PHOSPHATE SERPL-MCNC: 4.1 MG/DL (ref 2.5–4.5)
PHOSPHATE SERPL-MCNC: 4.2 MG/DL (ref 2.5–4.5)
PHOSPHATE SERPL-MCNC: 4.3 MG/DL (ref 2.5–4.5)
PHOSPHATE SERPL-MCNC: 4.4 MG/DL (ref 2.5–4.5)
PHOSPHATE SERPL-MCNC: 4.4 MG/DL (ref 2.5–4.5)
PHOSPHATE SERPL-MCNC: 4.5 MG/DL (ref 2.5–4.5)
PHOSPHATE SERPL-MCNC: 4.6 MG/DL (ref 2.5–4.5)
PHOSPHATE SERPL-MCNC: 4.7 MG/DL (ref 2.5–4.5)
PHOSPHATE SERPL-MCNC: 4.8 MG/DL (ref 2.5–4.5)
PHOSPHATE SERPL-MCNC: 4.9 MG/DL (ref 2.5–4.5)
PHOSPHATE SERPL-MCNC: 4.9 MG/DL (ref 2.5–4.5)
PHOSPHATE SERPL-MCNC: 5 MG/DL (ref 2.5–4.5)
PHOSPHATE SERPL-MCNC: 5 MG/DL (ref 2.5–4.5)
PHOSPHATE SERPL-MCNC: 5.1 MG/DL (ref 2.5–4.5)
PHOSPHATE SERPL-MCNC: 5.3 MG/DL (ref 2.5–4.5)
PHOSPHATE SERPL-MCNC: 5.6 MG/DL (ref 2.5–4.5)
PHOSPHATE SERPL-MCNC: 5.8 MG/DL (ref 2.5–4.5)
PHOSPHATE SERPL-MCNC: 5.9 MG/DL (ref 2.5–4.5)
PHOSPHATE SERPL-MCNC: 6.2 MG/DL (ref 2.5–4.5)
PHOSPHATE SERPL-MCNC: 6.2 MG/DL (ref 2.5–4.5)
PHOSPHATE SERPL-MCNC: 6.3 MG/DL (ref 2.5–4.5)
PLAT MORPH BLD: NORMAL
PLATELET # BLD AUTO: 222 10E3/UL (ref 150–450)
PLATELET # BLD AUTO: 242 10E3/UL (ref 150–450)
PLATELET # BLD AUTO: 243 10E3/UL (ref 150–450)
PLATELET # BLD AUTO: 249 10E3/UL (ref 150–450)
PLATELET # BLD AUTO: 251 10E3/UL (ref 150–450)
PLATELET # BLD AUTO: 253 10E3/UL (ref 150–450)
PLATELET # BLD AUTO: 254 10E3/UL (ref 150–450)
PLATELET # BLD AUTO: 257 10E3/UL (ref 150–450)
PLATELET # BLD AUTO: 258 10E3/UL (ref 150–450)
PLATELET # BLD AUTO: 261 10E3/UL (ref 150–450)
PLATELET # BLD AUTO: 262 10E3/UL (ref 150–450)
PLATELET # BLD AUTO: 262 10E3/UL (ref 150–450)
PLATELET # BLD AUTO: 265 10E3/UL (ref 150–450)
PLATELET # BLD AUTO: 267 10E3/UL (ref 150–450)
PLATELET # BLD AUTO: 269 10E3/UL (ref 150–450)
PLATELET # BLD AUTO: 270 10E3/UL (ref 150–450)
PLATELET # BLD AUTO: 270 10E3/UL (ref 150–450)
PLATELET # BLD AUTO: 271 10E3/UL (ref 150–450)
PLATELET # BLD AUTO: 271 10E3/UL (ref 150–450)
PLATELET # BLD AUTO: 272 10E3/UL (ref 150–450)
PLATELET # BLD AUTO: 273 10E3/UL (ref 150–450)
PLATELET # BLD AUTO: 274 10E3/UL (ref 150–450)
PLATELET # BLD AUTO: 275 10E3/UL (ref 150–450)
PLATELET # BLD AUTO: 275 10E3/UL (ref 150–450)
PLATELET # BLD AUTO: 278 10E3/UL (ref 150–450)
PLATELET # BLD AUTO: 280 10E3/UL (ref 150–450)
PLATELET # BLD AUTO: 280 10E3/UL (ref 150–450)
PLATELET # BLD AUTO: 281 10E3/UL (ref 150–450)
PLATELET # BLD AUTO: 281 10E3/UL (ref 150–450)
PLATELET # BLD AUTO: 282 10E3/UL (ref 150–450)
PLATELET # BLD AUTO: 282 10E3/UL (ref 150–450)
PLATELET # BLD AUTO: 284 10E3/UL (ref 150–450)
PLATELET # BLD AUTO: 286 10E3/UL (ref 150–450)
PLATELET # BLD AUTO: 290 10E3/UL (ref 150–450)
PLATELET # BLD AUTO: 294 10E3/UL (ref 150–450)
PLATELET # BLD AUTO: 295 10E3/UL (ref 150–450)
PLATELET # BLD AUTO: 295 10E3/UL (ref 150–450)
PLATELET # BLD AUTO: 297 10E3/UL (ref 150–450)
PLATELET # BLD AUTO: 299 10E3/UL (ref 150–450)
PLATELET # BLD AUTO: 300 10E3/UL (ref 150–450)
PLATELET # BLD AUTO: 302 10E3/UL (ref 150–450)
PLATELET # BLD AUTO: 303 10E3/UL (ref 150–450)
PLATELET # BLD AUTO: 303 10E3/UL (ref 150–450)
PLATELET # BLD AUTO: 307 10E3/UL (ref 150–450)
PLATELET # BLD AUTO: 309 10E3/UL (ref 150–450)
PLATELET # BLD AUTO: 309 10E3/UL (ref 150–450)
PLATELET # BLD AUTO: 310 10E3/UL (ref 150–450)
PLATELET # BLD AUTO: 311 10E3/UL (ref 150–450)
PLATELET # BLD AUTO: 312 10E3/UL (ref 150–450)
PLATELET # BLD AUTO: 312 10E3/UL (ref 150–450)
PLATELET # BLD AUTO: 317 10E3/UL (ref 150–450)
PLATELET # BLD AUTO: 318 10E3/UL (ref 150–450)
PLATELET # BLD AUTO: 325 10E3/UL (ref 150–450)
PLATELET # BLD AUTO: 339 10E3/UL (ref 150–450)
PLATELET # BLD AUTO: 347 10E3/UL (ref 150–450)
PLATELET # BLD AUTO: 351 10E3/UL (ref 150–450)
PLATELET # BLD AUTO: 353 10E3/UL (ref 150–450)
PLATELET # BLD AUTO: 354 10E3/UL (ref 150–450)
PLATELET # BLD AUTO: 362 10E3/UL (ref 150–450)
PLATELET # BLD AUTO: 367 10E3/UL (ref 150–450)
PLATELET # BLD AUTO: 375 10E3/UL (ref 150–450)
PLATELET # BLD AUTO: 402 10E3/UL (ref 150–450)
PLATELET # BLD AUTO: 403 10E3/UL (ref 150–450)
PLATELET # BLD AUTO: 417 10E3/UL (ref 150–450)
PLATELET # BLD AUTO: 438 10E3/UL (ref 150–450)
PLATELET # BLD AUTO: 444 10E3/UL (ref 150–450)
PLATELET # BLD AUTO: 508 10E3/UL (ref 150–450)
PLATELET # BLD AUTO: ABNORMAL 10*3/UL
PO2 BLD: 83 MM HG (ref 80–105)
PO2 BLDV: 73 MM HG (ref 25–47)
PO2 BLDV: 74 MM HG (ref 25–47)
POTASSIUM BLD-SCNC: 3.7 MMOL/L (ref 3.4–5.3)
POTASSIUM BLD-SCNC: 3.9 MMOL/L (ref 3.4–5.3)
POTASSIUM BLD-SCNC: 4 MMOL/L (ref 3.4–5.3)
POTASSIUM BLD-SCNC: 4.2 MMOL/L (ref 3.4–5.3)
POTASSIUM BLD-SCNC: 4.2 MMOL/L (ref 3.4–5.3)
POTASSIUM SERPL-SCNC: 3.1 MMOL/L (ref 3.4–5.3)
POTASSIUM SERPL-SCNC: 3.2 MMOL/L (ref 3.4–5.3)
POTASSIUM SERPL-SCNC: 3.3 MMOL/L (ref 3.4–5.3)
POTASSIUM SERPL-SCNC: 3.4 MMOL/L (ref 3.4–5.3)
POTASSIUM SERPL-SCNC: 3.5 MMOL/L (ref 3.4–5.3)
POTASSIUM SERPL-SCNC: 3.5 MMOL/L (ref 3.4–5.3)
POTASSIUM SERPL-SCNC: 3.6 MMOL/L (ref 3.4–5.3)
POTASSIUM SERPL-SCNC: 3.7 MMOL/L (ref 3.4–5.3)
POTASSIUM SERPL-SCNC: 3.8 MMOL/L (ref 3.4–5.3)
POTASSIUM SERPL-SCNC: 3.9 MMOL/L (ref 3.4–5.3)
POTASSIUM SERPL-SCNC: 4 MMOL/L (ref 3.4–5.3)
POTASSIUM SERPL-SCNC: 4.1 MMOL/L (ref 3.4–5.3)
POTASSIUM SERPL-SCNC: 4.2 MMOL/L (ref 3.4–5.3)
POTASSIUM SERPL-SCNC: 4.3 MMOL/L (ref 3.4–5.3)
POTASSIUM SERPL-SCNC: 4.4 MMOL/L (ref 3.4–5.3)
POTASSIUM SERPL-SCNC: 4.5 MMOL/L (ref 3.4–5.3)
POTASSIUM SERPL-SCNC: 4.6 MMOL/L (ref 3.4–5.3)
POTASSIUM SERPL-SCNC: 4.6 MMOL/L (ref 3.4–5.3)
POTASSIUM SERPL-SCNC: 4.7 MMOL/L (ref 3.4–5.3)
POTASSIUM SERPL-SCNC: 4.7 MMOL/L (ref 3.4–5.3)
POTASSIUM SERPL-SCNC: 4.9 MMOL/L (ref 3.4–5.3)
POTASSIUM SERPL-SCNC: 5 MMOL/L (ref 3.4–5.3)
POTASSIUM SERPL-SCNC: 5.1 MMOL/L (ref 3.4–5.3)
POTASSIUM SERPL-SCNC: 5.4 MMOL/L (ref 3.4–5.3)
PR INTERVAL - MUSE: 260 MS
PR INTERVAL - MUSE: 264 MS
PR INTERVAL - MUSE: 270 MS
PR INTERVAL - MUSE: 278 MS
PR INTERVAL - MUSE: 280 MS
PR INTERVAL - MUSE: 298 MS
PR INTERVAL - MUSE: 320 MS
PR INTERVAL - MUSE: 328 MS
PR INTERVAL - MUSE: 344 MS
PR INTERVAL - MUSE: NORMAL MS
PROCALCITONIN SERPL IA-MCNC: 0.23 NG/ML
PROCALCITONIN SERPL IA-MCNC: 0.27 NG/ML
PROCALCITONIN SERPL IA-MCNC: 0.3 NG/ML
PROT SERPL-MCNC: 5.3 G/DL (ref 6.4–8.3)
PROT SERPL-MCNC: 5.5 G/DL (ref 6.4–8.3)
PROT SERPL-MCNC: 5.8 G/DL (ref 6.4–8.3)
PROT SERPL-MCNC: 5.9 G/DL (ref 6.4–8.3)
PROT SERPL-MCNC: 5.9 G/DL (ref 6.4–8.3)
PROT SERPL-MCNC: 6 G/DL (ref 6.4–8.3)
PROT SERPL-MCNC: 6.1 G/DL (ref 6.4–8.3)
PROT SERPL-MCNC: 6.1 G/DL (ref 6.4–8.3)
PROT SERPL-MCNC: 6.2 G/DL (ref 6.4–8.3)
PROT SERPL-MCNC: 6.3 G/DL (ref 6.4–8.3)
PROT SERPL-MCNC: 6.4 G/DL (ref 6.4–8.3)
PROT SERPL-MCNC: 6.7 G/DL (ref 6.4–8.3)
PROT SERPL-MCNC: 6.8 G/DL (ref 6.4–8.3)
PROT/CREAT 24H UR: 0.21 MG/MG CR (ref 0–0.2)
QRS DURATION - MUSE: 102 MS
QRS DURATION - MUSE: 104 MS
QRS DURATION - MUSE: 104 MS
QRS DURATION - MUSE: 106 MS
QRS DURATION - MUSE: 82 MS
QRS DURATION - MUSE: 86 MS
QRS DURATION - MUSE: 86 MS
QRS DURATION - MUSE: 92 MS
QRS DURATION - MUSE: 98 MS
QRS DURATION - MUSE: 98 MS
QT - MUSE: 370 MS
QT - MUSE: 398 MS
QT - MUSE: 400 MS
QT - MUSE: 404 MS
QT - MUSE: 430 MS
QT - MUSE: 456 MS
QT - MUSE: 460 MS
QT - MUSE: 462 MS
QT - MUSE: 472 MS
QT - MUSE: 472 MS
QTC - MUSE: 421 MS
QTC - MUSE: 436 MS
QTC - MUSE: 444 MS
QTC - MUSE: 450 MS
QTC - MUSE: 467 MS
QTC - MUSE: 489 MS
QTC - MUSE: 490 MS
QTC - MUSE: 490 MS
QTC - MUSE: 492 MS
QTC - MUSE: 502 MS
QUANTIFERON MITOGEN: 10 IU/ML
QUANTIFERON NIL TUBE: 0.01 IU/ML
QUANTIFERON TB1 TUBE: 0.05 IU/ML
QUANTIFERON TB2 TUBE: 0.04
R AXIS - MUSE: -14 DEGREES
R AXIS - MUSE: -37 DEGREES
R AXIS - MUSE: -41 DEGREES
R AXIS - MUSE: 0 DEGREES
R AXIS - MUSE: 1 DEGREES
R AXIS - MUSE: 1 DEGREES
R AXIS - MUSE: 16 DEGREES
R AXIS - MUSE: 2 DEGREES
R AXIS - MUSE: 4 DEGREES
R AXIS - MUSE: 7 DEGREES
RBC # BLD AUTO: 1.82 10E6/UL (ref 4.4–5.9)
RBC # BLD AUTO: 2.26 10E6/UL (ref 4.4–5.9)
RBC # BLD AUTO: 2.31 10E6/UL (ref 4.4–5.9)
RBC # BLD AUTO: 2.31 10E6/UL (ref 4.4–5.9)
RBC # BLD AUTO: 2.33 10E6/UL (ref 4.4–5.9)
RBC # BLD AUTO: 2.36 10E6/UL (ref 4.4–5.9)
RBC # BLD AUTO: 2.37 10E6/UL (ref 4.4–5.9)
RBC # BLD AUTO: 2.4 10E6/UL (ref 4.4–5.9)
RBC # BLD AUTO: 2.4 10E6/UL (ref 4.4–5.9)
RBC # BLD AUTO: 2.42 10E6/UL (ref 4.4–5.9)
RBC # BLD AUTO: 2.42 10E6/UL (ref 4.4–5.9)
RBC # BLD AUTO: 2.46 10E6/UL (ref 4.4–5.9)
RBC # BLD AUTO: 2.48 10E6/UL (ref 4.4–5.9)
RBC # BLD AUTO: 2.49 10E6/UL (ref 4.4–5.9)
RBC # BLD AUTO: 2.5 10E6/UL (ref 4.4–5.9)
RBC # BLD AUTO: 2.5 10E6/UL (ref 4.4–5.9)
RBC # BLD AUTO: 2.51 10E6/UL (ref 4.4–5.9)
RBC # BLD AUTO: 2.52 10E6/UL (ref 4.4–5.9)
RBC # BLD AUTO: 2.53 10E6/UL (ref 4.4–5.9)
RBC # BLD AUTO: 2.54 10E6/UL (ref 4.4–5.9)
RBC # BLD AUTO: 2.55 10E6/UL (ref 4.4–5.9)
RBC # BLD AUTO: 2.55 10E6/UL (ref 4.4–5.9)
RBC # BLD AUTO: 2.57 10E6/UL (ref 4.4–5.9)
RBC # BLD AUTO: 2.6 10E6/UL (ref 4.4–5.9)
RBC # BLD AUTO: 2.62 10E6/UL (ref 4.4–5.9)
RBC # BLD AUTO: 2.63 10E6/UL (ref 4.4–5.9)
RBC # BLD AUTO: 2.63 10E6/UL (ref 4.4–5.9)
RBC # BLD AUTO: 2.66 10E6/UL (ref 4.4–5.9)
RBC # BLD AUTO: 2.68 10E6/UL (ref 4.4–5.9)
RBC # BLD AUTO: 2.7 10E6/UL (ref 4.4–5.9)
RBC # BLD AUTO: 2.7 10E6/UL (ref 4.4–5.9)
RBC # BLD AUTO: 2.72 10E6/UL (ref 4.4–5.9)
RBC # BLD AUTO: 2.73 10E6/UL (ref 4.4–5.9)
RBC # BLD AUTO: 2.78 10E6/UL (ref 4.4–5.9)
RBC # BLD AUTO: 2.82 10E6/UL (ref 4.4–5.9)
RBC # BLD AUTO: 2.82 10E6/UL (ref 4.4–5.9)
RBC # BLD AUTO: 2.83 10E6/UL (ref 4.4–5.9)
RBC # BLD AUTO: 2.84 10E6/UL (ref 4.4–5.9)
RBC # BLD AUTO: 2.85 10E6/UL (ref 4.4–5.9)
RBC # BLD AUTO: 2.94 10E6/UL (ref 4.4–5.9)
RBC # BLD AUTO: 3.4 10E6/UL (ref 4.4–5.9)
RBC # BLD AUTO: 3.42 10E6/UL (ref 4.4–5.9)
RBC # BLD AUTO: 3.43 10E6/UL (ref 4.4–5.9)
RBC # BLD AUTO: 3.46 10E6/UL (ref 4.4–5.9)
RBC # BLD AUTO: 3.56 10E6/UL (ref 4.4–5.9)
RBC # BLD AUTO: 3.6 10E6/UL (ref 4.4–5.9)
RBC # BLD AUTO: 3.65 10E6/UL (ref 4.4–5.9)
RBC # BLD AUTO: 3.69 10E6/UL (ref 4.4–5.9)
RBC # BLD AUTO: 3.71 10E6/UL (ref 4.4–5.9)
RBC # BLD AUTO: 3.72 10E6/UL (ref 4.4–5.9)
RBC # BLD AUTO: 3.73 10E6/UL (ref 4.4–5.9)
RBC # BLD AUTO: 3.79 10E6/UL (ref 4.4–5.9)
RBC # BLD AUTO: 3.81 10E6/UL (ref 4.4–5.9)
RBC # BLD AUTO: 3.82 10E6/UL (ref 4.4–5.9)
RBC # BLD AUTO: 3.82 10E6/UL (ref 4.4–5.9)
RBC # BLD AUTO: 3.84 10E6/UL (ref 4.4–5.9)
RBC # BLD AUTO: 3.85 10E6/UL (ref 4.4–5.9)
RBC # BLD AUTO: 3.86 10E6/UL (ref 4.4–5.9)
RBC # BLD AUTO: 3.86 10E6/UL (ref 4.4–5.9)
RBC # BLD AUTO: 3.93 10E6/UL (ref 4.4–5.9)
RBC # BLD AUTO: 4.02 10E6/UL (ref 4.4–5.9)
RBC # BLD AUTO: 4.02 10E6/UL (ref 4.4–5.9)
RBC # BLD AUTO: 4.06 10E6/UL (ref 4.4–5.9)
RBC # BLD AUTO: 4.08 10E6/UL (ref 4.4–5.9)
RBC # BLD AUTO: 4.13 10E6/UL (ref 4.4–5.9)
RBC # BLD AUTO: 4.15 10E6/UL (ref 4.4–5.9)
RBC # BLD AUTO: 4.25 10E6/UL (ref 4.4–5.9)
RBC MORPH BLD: NORMAL
RBC URINE: 1 /HPF
RBC URINE: 18 /HPF
RBC URINE: 2 /HPF
RBC URINE: 7 /HPF
RETICS # AUTO: 0.05 10E6/UL (ref 0.03–0.1)
RETICS/RBC NFR AUTO: 2 % (ref 0.5–2)
SARS-COV-2 RNA RESP QL NAA+PROBE: NEGATIVE
SCANNED LAB RESULT: NORMAL
SODIUM SERPL-SCNC: 134 MMOL/L (ref 136–145)
SODIUM SERPL-SCNC: 135 MMOL/L (ref 136–145)
SODIUM SERPL-SCNC: 135 MMOL/L (ref 136–145)
SODIUM SERPL-SCNC: 136 MMOL/L (ref 133–144)
SODIUM SERPL-SCNC: 136 MMOL/L (ref 133–144)
SODIUM SERPL-SCNC: 136 MMOL/L (ref 136–145)
SODIUM SERPL-SCNC: 136 MMOL/L (ref 136–145)
SODIUM SERPL-SCNC: 137 MMOL/L (ref 133–144)
SODIUM SERPL-SCNC: 137 MMOL/L (ref 136–145)
SODIUM SERPL-SCNC: 138 MMOL/L (ref 133–144)
SODIUM SERPL-SCNC: 138 MMOL/L (ref 136–145)
SODIUM SERPL-SCNC: 139 MMOL/L (ref 133–144)
SODIUM SERPL-SCNC: 139 MMOL/L (ref 133–144)
SODIUM SERPL-SCNC: 139 MMOL/L (ref 136–145)
SODIUM SERPL-SCNC: 140 MMOL/L (ref 133–144)
SODIUM SERPL-SCNC: 140 MMOL/L (ref 136–145)
SODIUM SERPL-SCNC: 141 MMOL/L (ref 136–145)
SODIUM SERPL-SCNC: 142 MMOL/L (ref 136–145)
SODIUM SERPL-SCNC: 143 MMOL/L (ref 136–145)
SODIUM SERPL-SCNC: 144 MMOL/L (ref 136–145)
SODIUM SERPL-SCNC: 145 MMOL/L (ref 136–145)
SODIUM SERPL-SCNC: 146 MMOL/L (ref 136–145)
SODIUM SERPL-SCNC: 147 MMOL/L (ref 136–145)
SODIUM SERPL-SCNC: 147 MMOL/L (ref 136–145)
SODIUM SERPL-SCNC: 148 MMOL/L (ref 136–145)
SODIUM UR-SCNC: 27 MMOL/L
SODIUM UR-SCNC: 71 MMOL/L
SODIUM UR-SCNC: <20 MMOL/L
SP GR UR STRIP: 1.01 (ref 1–1.03)
SP GR UR STRIP: 1.01 (ref 1–1.03)
SP GR UR STRIP: 1.02 (ref 1–1.03)
SP GR UR STRIP: 1.02 (ref 1–1.03)
SPECIMEN EXPIRATION DATE: NORMAL
SQUAMOUS EPITHELIAL: 1 /HPF
SQUAMOUS EPITHELIAL: 1 /HPF
SQUAMOUS EPITHELIAL: 3 /HPF
SQUAMOUS EPITHELIAL: <1 /HPF
STAPHYLOCOCCUS AUREUS: DETECTED
STAPHYLOCOCCUS EPIDERMIDIS: NOT DETECTED
STAPHYLOCOCCUS LUGDUNENSIS: NOT DETECTED
STREPTOCOCCUS AGALACTIAE: NOT DETECTED
STREPTOCOCCUS ANGINOSUS GROUP: NOT DETECTED
STREPTOCOCCUS PNEUMONIAE: NOT DETECTED
STREPTOCOCCUS PYOGENES: NOT DETECTED
STREPTOCOCCUS SPECIES: NOT DETECTED
SYSTOLIC BLOOD PRESSURE - MUSE: NORMAL MMHG
T AXIS - MUSE: -18 DEGREES
T AXIS - MUSE: -23 DEGREES
T AXIS - MUSE: -29 DEGREES
T AXIS - MUSE: 0 DEGREES
T AXIS - MUSE: 14 DEGREES
T AXIS - MUSE: 25 DEGREES
T AXIS - MUSE: 4 DEGREES
T AXIS - MUSE: 44 DEGREES
T AXIS - MUSE: 46 DEGREES
T AXIS - MUSE: 9 DEGREES
TRANSFERRIN SERPL-MCNC: 114 MG/DL (ref 200–360)
TRANSITIONAL EPI: 1 /HPF
TRANSITIONAL EPI: 1 /HPF
TRANSITIONAL EPI: <1 /HPF
TRIGL SERPL-MCNC: 142 MG/DL
TROPONIN T SERPL HS-MCNC: 46 NG/L
TROPONIN T SERPL HS-MCNC: 51 NG/L
TROPONIN T SERPL HS-MCNC: 51 NG/L
TSH SERPL DL<=0.005 MIU/L-ACNC: 1.23 UIU/ML (ref 0.3–4.2)
UNIT ABO/RH: NORMAL
UNIT NUMBER: NORMAL
UNIT STATUS: NORMAL
UNIT TYPE ISBT: 5100
UROBILINOGEN UR STRIP-MCNC: NORMAL MG/DL
VANCOMYCIN SERPL-MCNC: 16 UG/ML
VANCOMYCIN SERPL-MCNC: 20.1 UG/ML
VANCOMYCIN SERPL-MCNC: 20.7 UG/ML
VANCOMYCIN SERPL-MCNC: 21 UG/ML
VANCOMYCIN SERPL-MCNC: 21.3 UG/ML
VANCOMYCIN SERPL-MCNC: 22.2 UG/ML
VANCOMYCIN SERPL-MCNC: 22.4 UG/ML
VANCOMYCIN SERPL-MCNC: 22.8 UG/ML
VANCOMYCIN SERPL-MCNC: 23.1 UG/ML
VANCOMYCIN SERPL-MCNC: 24.1 UG/ML
VANCOMYCIN SERPL-MCNC: 24.6 UG/ML
VANCOMYCIN SERPL-MCNC: 24.9 UG/ML
VANCOMYCIN SERPL-MCNC: 25.1 UG/ML
VANCOMYCIN SERPL-MCNC: 25.5 UG/ML
VANCOMYCIN SERPL-MCNC: 25.7 UG/ML
VANCOMYCIN SERPL-MCNC: 26.1 UG/ML
VANCOMYCIN SERPL-MCNC: 26.4 UG/ML
VANCOMYCIN SERPL-MCNC: 28.2 UG/ML
VANCOMYCIN SERPL-MCNC: 28.9 UG/ML
VANCOMYCIN SERPL-MCNC: 29.9 UG/ML
VANCOMYCIN SERPL-MCNC: 31.4 UG/ML
VENTRICULAR RATE- MUSE: 65 BPM
VENTRICULAR RATE- MUSE: 65 BPM
VENTRICULAR RATE- MUSE: 68 BPM
VENTRICULAR RATE- MUSE: 70 BPM
VENTRICULAR RATE- MUSE: 70 BPM
VENTRICULAR RATE- MUSE: 71 BPM
VENTRICULAR RATE- MUSE: 71 BPM
VENTRICULAR RATE- MUSE: 75 BPM
VENTRICULAR RATE- MUSE: 76 BPM
VENTRICULAR RATE- MUSE: 78 BPM
VIT B12 SERPL-MCNC: 521 PG/ML (ref 232–1245)
VIT B12 SERPL-MCNC: 601 PG/ML (ref 232–1245)
VIT B12 SERPL-MCNC: 692 PG/ML (ref 232–1245)
WBC # BLD AUTO: 10 10E3/UL (ref 4–11)
WBC # BLD AUTO: 10.1 10E3/UL (ref 4–11)
WBC # BLD AUTO: 10.1 10E3/UL (ref 4–11)
WBC # BLD AUTO: 10.5 10E3/UL (ref 4–11)
WBC # BLD AUTO: 10.6 10E3/UL (ref 4–11)
WBC # BLD AUTO: 10.7 10E3/UL (ref 4–11)
WBC # BLD AUTO: 10.9 10E3/UL (ref 4–11)
WBC # BLD AUTO: 10.9 10E3/UL (ref 4–11)
WBC # BLD AUTO: 11.1 10E3/UL (ref 4–11)
WBC # BLD AUTO: 11.6 10E3/UL (ref 4–11)
WBC # BLD AUTO: 11.7 10E3/UL (ref 4–11)
WBC # BLD AUTO: 11.8 10E3/UL (ref 4–11)
WBC # BLD AUTO: 12 10E3/UL (ref 4–11)
WBC # BLD AUTO: 12.3 10E3/UL (ref 4–11)
WBC # BLD AUTO: 13.8 10E3/UL (ref 4–11)
WBC # BLD AUTO: 13.9 10E3/UL (ref 4–11)
WBC # BLD AUTO: 14 10E3/UL (ref 4–11)
WBC # BLD AUTO: 14.1 10E3/UL (ref 4–11)
WBC # BLD AUTO: 14.9 10E3/UL (ref 4–11)
WBC # BLD AUTO: 17.8 10E3/UL (ref 4–11)
WBC # BLD AUTO: 18.1 10E3/UL (ref 4–11)
WBC # BLD AUTO: 5.7 10E3/UL (ref 4–11)
WBC # BLD AUTO: 6.3 10E3/UL (ref 4–11)
WBC # BLD AUTO: 6.6 10E3/UL (ref 4–11)
WBC # BLD AUTO: 7.1 10E3/UL (ref 4–11)
WBC # BLD AUTO: 7.2 10E3/UL (ref 4–11)
WBC # BLD AUTO: 7.3 10E3/UL (ref 4–11)
WBC # BLD AUTO: 7.5 10E3/UL (ref 4–11)
WBC # BLD AUTO: 7.5 10E3/UL (ref 4–11)
WBC # BLD AUTO: 7.6 10E3/UL (ref 4–11)
WBC # BLD AUTO: 7.8 10E3/UL (ref 4–11)
WBC # BLD AUTO: 7.8 10E3/UL (ref 4–11)
WBC # BLD AUTO: 7.9 10E3/UL (ref 4–11)
WBC # BLD AUTO: 8 10E3/UL (ref 4–11)
WBC # BLD AUTO: 8.1 10E3/UL (ref 4–11)
WBC # BLD AUTO: 8.1 10E3/UL (ref 4–11)
WBC # BLD AUTO: 8.2 10E3/UL (ref 4–11)
WBC # BLD AUTO: 8.3 10E3/UL (ref 4–11)
WBC # BLD AUTO: 8.4 10E3/UL (ref 4–11)
WBC # BLD AUTO: 8.5 10E3/UL (ref 4–11)
WBC # BLD AUTO: 8.5 10E3/UL (ref 4–11)
WBC # BLD AUTO: 8.6 10E3/UL (ref 4–11)
WBC # BLD AUTO: 8.7 10E3/UL (ref 4–11)
WBC # BLD AUTO: 8.9 10E3/UL (ref 4–11)
WBC # BLD AUTO: 8.9 10E3/UL (ref 4–11)
WBC # BLD AUTO: 9 10E3/UL (ref 4–11)
WBC # BLD AUTO: 9.1 10E3/UL (ref 4–11)
WBC # BLD AUTO: 9.2 10E3/UL (ref 4–11)
WBC # BLD AUTO: 9.3 10E3/UL (ref 4–11)
WBC # BLD AUTO: 9.4 10E3/UL (ref 4–11)
WBC # BLD AUTO: 9.5 10E3/UL (ref 4–11)
WBC # BLD AUTO: 9.5 10E3/UL (ref 4–11)
WBC # BLD AUTO: 9.6 10E3/UL (ref 4–11)
WBC # BLD AUTO: 9.8 10E3/UL (ref 4–11)
WBC # BLD AUTO: 9.9 10E3/UL (ref 4–11)
WBC # BLD AUTO: 9.9 10E3/UL (ref 4–11)
WBC # FLD AUTO: ABNORMAL /UL
WBC URINE: 14 /HPF
WBC URINE: 27 /HPF
WBC URINE: 3 /HPF
WBC URINE: 66 /HPF

## 2023-01-01 PROCEDURE — 250N000013 HC RX MED GY IP 250 OP 250 PS 637: Performed by: NURSE PRACTITIONER

## 2023-01-01 PROCEDURE — 250N000013 HC RX MED GY IP 250 OP 250 PS 637

## 2023-01-01 PROCEDURE — 120N000002 HC R&B MED SURG/OB UMMC

## 2023-01-01 PROCEDURE — 250N000013 HC RX MED GY IP 250 OP 250 PS 637: Performed by: PHYSICIAN ASSISTANT

## 2023-01-01 PROCEDURE — 120N000009 HC R&B SNF

## 2023-01-01 PROCEDURE — 99233 SBSQ HOSP IP/OBS HIGH 50: CPT | Performed by: INTERNAL MEDICINE

## 2023-01-01 PROCEDURE — 97530 THERAPEUTIC ACTIVITIES: CPT | Mod: GP | Performed by: PHYSICAL THERAPIST

## 2023-01-01 PROCEDURE — 80069 RENAL FUNCTION PANEL: CPT | Performed by: INTERNAL MEDICINE

## 2023-01-01 PROCEDURE — 250N000013 HC RX MED GY IP 250 OP 250 PS 637: Performed by: INTERNAL MEDICINE

## 2023-01-01 PROCEDURE — 250N000013 HC RX MED GY IP 250 OP 250 PS 637: Performed by: STUDENT IN AN ORGANIZED HEALTH CARE EDUCATION/TRAINING PROGRAM

## 2023-01-01 PROCEDURE — 99233 SBSQ HOSP IP/OBS HIGH 50: CPT | Mod: 24

## 2023-01-01 PROCEDURE — 36415 COLL VENOUS BLD VENIPUNCTURE: CPT | Performed by: INTERNAL MEDICINE

## 2023-01-01 PROCEDURE — 258N000003 HC RX IP 258 OP 636: Performed by: STUDENT IN AN ORGANIZED HEALTH CARE EDUCATION/TRAINING PROGRAM

## 2023-01-01 PROCEDURE — 97530 THERAPEUTIC ACTIVITIES: CPT | Mod: GO

## 2023-01-01 PROCEDURE — 250N000011 HC RX IP 250 OP 636: Performed by: PHYSICIAN ASSISTANT

## 2023-01-01 PROCEDURE — 36592 COLLECT BLOOD FROM PICC: CPT

## 2023-01-01 PROCEDURE — 93312 ECHO TRANSESOPHAGEAL: CPT | Mod: 26 | Performed by: INTERNAL MEDICINE

## 2023-01-01 PROCEDURE — 250N000011 HC RX IP 250 OP 636: Performed by: INTERNAL MEDICINE

## 2023-01-01 PROCEDURE — 87075 CULTR BACTERIA EXCEPT BLOOD: CPT | Performed by: STUDENT IN AN ORGANIZED HEALTH CARE EDUCATION/TRAINING PROGRAM

## 2023-01-01 PROCEDURE — 250N000013 HC RX MED GY IP 250 OP 250 PS 637: Performed by: ORTHOPAEDIC SURGERY

## 2023-01-01 PROCEDURE — 97165 OT EVAL LOW COMPLEX 30 MIN: CPT | Mod: GO

## 2023-01-01 PROCEDURE — 84300 ASSAY OF URINE SODIUM: CPT | Performed by: PEDIATRICS

## 2023-01-01 PROCEDURE — 99233 SBSQ HOSP IP/OBS HIGH 50: CPT | Mod: 24 | Performed by: INTERNAL MEDICINE

## 2023-01-01 PROCEDURE — P9016 RBC LEUKOCYTES REDUCED: HCPCS | Performed by: STUDENT IN AN ORGANIZED HEALTH CARE EDUCATION/TRAINING PROGRAM

## 2023-01-01 PROCEDURE — 99233 SBSQ HOSP IP/OBS HIGH 50: CPT | Mod: 24 | Performed by: STUDENT IN AN ORGANIZED HEALTH CARE EDUCATION/TRAINING PROGRAM

## 2023-01-01 PROCEDURE — 250N000013 HC RX MED GY IP 250 OP 250 PS 637: Performed by: PEDIATRICS

## 2023-01-01 PROCEDURE — 250N000009 HC RX 250: Performed by: PHYSICIAN ASSISTANT

## 2023-01-01 PROCEDURE — 36592 COLLECT BLOOD FROM PICC: CPT | Performed by: INTERNAL MEDICINE

## 2023-01-01 PROCEDURE — 97530 THERAPEUTIC ACTIVITIES: CPT | Mod: GO | Performed by: OCCUPATIONAL THERAPIST

## 2023-01-01 PROCEDURE — 87040 BLOOD CULTURE FOR BACTERIA: CPT | Performed by: INTERNAL MEDICINE

## 2023-01-01 PROCEDURE — 99222 1ST HOSP IP/OBS MODERATE 55: CPT | Performed by: PSYCHIATRY & NEUROLOGY

## 2023-01-01 PROCEDURE — 99309 SBSQ NF CARE MODERATE MDM 30: CPT | Performed by: PHYSICIAN ASSISTANT

## 2023-01-01 PROCEDURE — 99417 PROLNG OP E/M EACH 15 MIN: CPT | Performed by: REGISTERED NURSE

## 2023-01-01 PROCEDURE — 85027 COMPLETE CBC AUTOMATED: CPT | Performed by: PHYSICIAN ASSISTANT

## 2023-01-01 PROCEDURE — 710N000010 HC RECOVERY PHASE 1, LEVEL 2, PER MIN: Performed by: ORTHOPAEDIC SURGERY

## 2023-01-01 PROCEDURE — 36415 COLL VENOUS BLD VENIPUNCTURE: CPT | Performed by: ORTHOPAEDIC SURGERY

## 2023-01-01 PROCEDURE — 83735 ASSAY OF MAGNESIUM: CPT | Performed by: INTERNAL MEDICINE

## 2023-01-01 PROCEDURE — 93010 ELECTROCARDIOGRAM REPORT: CPT | Performed by: INTERNAL MEDICINE

## 2023-01-01 PROCEDURE — 97602 WOUND(S) CARE NON-SELECTIVE: CPT

## 2023-01-01 PROCEDURE — 85027 COMPLETE CBC AUTOMATED: CPT

## 2023-01-01 PROCEDURE — 36415 COLL VENOUS BLD VENIPUNCTURE: CPT | Performed by: PHYSICIAN ASSISTANT

## 2023-01-01 PROCEDURE — 360N000078 HC SURGERY LEVEL 5, PER MIN: Performed by: ORTHOPAEDIC SURGERY

## 2023-01-01 PROCEDURE — 80048 BASIC METABOLIC PNL TOTAL CA: CPT | Performed by: PHYSICIAN ASSISTANT

## 2023-01-01 PROCEDURE — 84295 ASSAY OF SERUM SODIUM: CPT | Performed by: STUDENT IN AN ORGANIZED HEALTH CARE EDUCATION/TRAINING PROGRAM

## 2023-01-01 PROCEDURE — 99207 PR APP CREDIT; MD BILLING SHARED VISIT: CPT | Performed by: PHYSICIAN ASSISTANT

## 2023-01-01 PROCEDURE — 85027 COMPLETE CBC AUTOMATED: CPT | Performed by: STUDENT IN AN ORGANIZED HEALTH CARE EDUCATION/TRAINING PROGRAM

## 2023-01-01 PROCEDURE — 80069 RENAL FUNCTION PANEL: CPT | Performed by: STUDENT IN AN ORGANIZED HEALTH CARE EDUCATION/TRAINING PROGRAM

## 2023-01-01 PROCEDURE — 84484 ASSAY OF TROPONIN QUANT: CPT | Performed by: INTERNAL MEDICINE

## 2023-01-01 PROCEDURE — 36592 COLLECT BLOOD FROM PICC: CPT | Performed by: STUDENT IN AN ORGANIZED HEALTH CARE EDUCATION/TRAINING PROGRAM

## 2023-01-01 PROCEDURE — 27134 REVISE HIP JOINT REPLACEMENT: CPT | Mod: 22 | Performed by: ORTHOPAEDIC SURGERY

## 2023-01-01 PROCEDURE — 99309 SBSQ NF CARE MODERATE MDM 30: CPT | Performed by: NURSE PRACTITIONER

## 2023-01-01 PROCEDURE — 99418 PROLNG IP/OBS E/M EA 15 MIN: CPT | Performed by: CLINICAL NURSE SPECIALIST

## 2023-01-01 PROCEDURE — 76937 US GUIDE VASCULAR ACCESS: CPT | Mod: 26 | Performed by: PHYSICIAN ASSISTANT

## 2023-01-01 PROCEDURE — 97110 THERAPEUTIC EXERCISES: CPT | Mod: GP | Performed by: REHABILITATION PRACTITIONER

## 2023-01-01 PROCEDURE — 80202 ASSAY OF VANCOMYCIN: CPT | Performed by: STUDENT IN AN ORGANIZED HEALTH CARE EDUCATION/TRAINING PROGRAM

## 2023-01-01 PROCEDURE — 99418 PROLNG IP/OBS E/M EA 15 MIN: CPT | Performed by: INTERNAL MEDICINE

## 2023-01-01 PROCEDURE — 84132 ASSAY OF SERUM POTASSIUM: CPT | Performed by: INTERNAL MEDICINE

## 2023-01-01 PROCEDURE — 82607 VITAMIN B-12: CPT

## 2023-01-01 PROCEDURE — 258N000003 HC RX IP 258 OP 636: Performed by: NURSE ANESTHETIST, CERTIFIED REGISTERED

## 2023-01-01 PROCEDURE — 250N000011 HC RX IP 250 OP 636: Mod: JZ | Performed by: INTERNAL MEDICINE

## 2023-01-01 PROCEDURE — 258N000003 HC RX IP 258 OP 636: Performed by: ORTHOPAEDIC SURGERY

## 2023-01-01 PROCEDURE — 250N000011 HC RX IP 250 OP 636: Mod: JZ | Performed by: STUDENT IN AN ORGANIZED HEALTH CARE EDUCATION/TRAINING PROGRAM

## 2023-01-01 PROCEDURE — 86140 C-REACTIVE PROTEIN: CPT | Performed by: PHYSICIAN ASSISTANT

## 2023-01-01 PROCEDURE — 82248 BILIRUBIN DIRECT: CPT | Performed by: STUDENT IN AN ORGANIZED HEALTH CARE EDUCATION/TRAINING PROGRAM

## 2023-01-01 PROCEDURE — 99233 SBSQ HOSP IP/OBS HIGH 50: CPT | Mod: 24 | Performed by: PHYSICIAN ASSISTANT

## 2023-01-01 PROCEDURE — 93005 ELECTROCARDIOGRAM TRACING: CPT

## 2023-01-01 PROCEDURE — 97110 THERAPEUTIC EXERCISES: CPT | Mod: GP

## 2023-01-01 PROCEDURE — C1750 CATH, HEMODIALYSIS,LONG-TERM: HCPCS

## 2023-01-01 PROCEDURE — 86140 C-REACTIVE PROTEIN: CPT | Performed by: STUDENT IN AN ORGANIZED HEALTH CARE EDUCATION/TRAINING PROGRAM

## 2023-01-01 PROCEDURE — 258N000003 HC RX IP 258 OP 636

## 2023-01-01 PROCEDURE — 73502 X-RAY EXAM HIP UNI 2-3 VIEWS: CPT | Mod: LT | Performed by: RADIOLOGY

## 2023-01-01 PROCEDURE — 02PY33Z REMOVAL OF INFUSION DEVICE FROM GREAT VESSEL, PERCUTANEOUS APPROACH: ICD-10-PCS | Performed by: RADIOLOGY

## 2023-01-01 PROCEDURE — 97166 OT EVAL MOD COMPLEX 45 MIN: CPT | Mod: GO | Performed by: OCCUPATIONAL THERAPIST

## 2023-01-01 PROCEDURE — 82040 ASSAY OF SERUM ALBUMIN: CPT | Performed by: INTERNAL MEDICINE

## 2023-01-01 PROCEDURE — 85027 COMPLETE CBC AUTOMATED: CPT | Performed by: INTERNAL MEDICINE

## 2023-01-01 PROCEDURE — 99232 SBSQ HOSP IP/OBS MODERATE 35: CPT | Performed by: INTERNAL MEDICINE

## 2023-01-01 PROCEDURE — 36591 DRAW BLOOD OFF VENOUS DEVICE: CPT | Performed by: PHYSICIAN ASSISTANT

## 2023-01-01 PROCEDURE — 250N000011 HC RX IP 250 OP 636: Mod: JZ | Performed by: ORTHOPAEDIC SURGERY

## 2023-01-01 PROCEDURE — 258N000003 HC RX IP 258 OP 636: Performed by: PHYSICIAN ASSISTANT

## 2023-01-01 PROCEDURE — 83550 IRON BINDING TEST: CPT | Performed by: PEDIATRICS

## 2023-01-01 PROCEDURE — 71045 X-RAY EXAM CHEST 1 VIEW: CPT | Mod: 26 | Performed by: RADIOLOGY

## 2023-01-01 PROCEDURE — 80048 BASIC METABOLIC PNL TOTAL CA: CPT | Performed by: STUDENT IN AN ORGANIZED HEALTH CARE EDUCATION/TRAINING PROGRAM

## 2023-01-01 PROCEDURE — 73560 X-RAY EXAM OF KNEE 1 OR 2: CPT | Mod: LT

## 2023-01-01 PROCEDURE — 86481 TB AG RESPONSE T-CELL SUSP: CPT | Performed by: NURSE PRACTITIONER

## 2023-01-01 PROCEDURE — G0463 HOSPITAL OUTPT CLINIC VISIT: HCPCS

## 2023-01-01 PROCEDURE — 250N000011 HC RX IP 250 OP 636: Performed by: STUDENT IN AN ORGANIZED HEALTH CARE EDUCATION/TRAINING PROGRAM

## 2023-01-01 PROCEDURE — 272N000001 HC OR GENERAL SUPPLY STERILE: Performed by: ORTHOPAEDIC SURGERY

## 2023-01-01 PROCEDURE — 82310 ASSAY OF CALCIUM: CPT | Performed by: STUDENT IN AN ORGANIZED HEALTH CARE EDUCATION/TRAINING PROGRAM

## 2023-01-01 PROCEDURE — 85014 HEMATOCRIT: CPT | Performed by: PEDIATRICS

## 2023-01-01 PROCEDURE — 250N000013 HC RX MED GY IP 250 OP 250 PS 637: Performed by: PSYCHIATRY & NEUROLOGY

## 2023-01-01 PROCEDURE — 93325 DOPPLER ECHO COLOR FLOW MAPG: CPT

## 2023-01-01 PROCEDURE — 86850 RBC ANTIBODY SCREEN: CPT | Performed by: STUDENT IN AN ORGANIZED HEALTH CARE EDUCATION/TRAINING PROGRAM

## 2023-01-01 PROCEDURE — 36415 COLL VENOUS BLD VENIPUNCTURE: CPT | Performed by: STUDENT IN AN ORGANIZED HEALTH CARE EDUCATION/TRAINING PROGRAM

## 2023-01-01 PROCEDURE — 250N000009 HC RX 250: Performed by: STUDENT IN AN ORGANIZED HEALTH CARE EDUCATION/TRAINING PROGRAM

## 2023-01-01 PROCEDURE — 81001 URINALYSIS AUTO W/SCOPE: CPT

## 2023-01-01 PROCEDURE — 85652 RBC SED RATE AUTOMATED: CPT | Performed by: STUDENT IN AN ORGANIZED HEALTH CARE EDUCATION/TRAINING PROGRAM

## 2023-01-01 PROCEDURE — 82947 ASSAY GLUCOSE BLOOD QUANT: CPT | Performed by: INTERNAL MEDICINE

## 2023-01-01 PROCEDURE — 99207 PR CDG-CUT & PASTE-POTENTIAL IMPACT ON LEVEL: CPT | Performed by: NURSE PRACTITIONER

## 2023-01-01 PROCEDURE — 85014 HEMATOCRIT: CPT | Performed by: STUDENT IN AN ORGANIZED HEALTH CARE EDUCATION/TRAINING PROGRAM

## 2023-01-01 PROCEDURE — 97162 PT EVAL MOD COMPLEX 30 MIN: CPT | Mod: GP

## 2023-01-01 PROCEDURE — 85027 COMPLETE CBC AUTOMATED: CPT | Performed by: PEDIATRICS

## 2023-01-01 PROCEDURE — 999N000141 HC STATISTIC PRE-PROCEDURE NURSING ASSESSMENT: Performed by: ORTHOPAEDIC SURGERY

## 2023-01-01 PROCEDURE — 87077 CULTURE AEROBIC IDENTIFY: CPT

## 2023-01-01 PROCEDURE — 99213 OFFICE O/P EST LOW 20 MIN: CPT | Performed by: PHYSICIAN ASSISTANT

## 2023-01-01 PROCEDURE — 250N000011 HC RX IP 250 OP 636: Mod: JZ | Performed by: PEDIATRICS

## 2023-01-01 PROCEDURE — 710N000009 HC RECOVERY PHASE 1, LEVEL 1, PER MIN: Performed by: ORTHOPAEDIC SURGERY

## 2023-01-01 PROCEDURE — 72170 X-RAY EXAM OF PELVIS: CPT

## 2023-01-01 PROCEDURE — 999N000125 HC STATISTIC PATIENT MED CONFERENCE < 30 MIN

## 2023-01-01 PROCEDURE — 250N000011 HC RX IP 250 OP 636: Performed by: PEDIATRICS

## 2023-01-01 PROCEDURE — 97535 SELF CARE MNGMENT TRAINING: CPT | Mod: GO

## 2023-01-01 PROCEDURE — 99308 SBSQ NF CARE LOW MDM 20: CPT | Performed by: PHYSICIAN ASSISTANT

## 2023-01-01 PROCEDURE — 97163 PT EVAL HIGH COMPLEX 45 MIN: CPT | Mod: GP | Performed by: PHYSICAL THERAPIST

## 2023-01-01 PROCEDURE — 76882 US LMTD JT/FCL EVL NVASC XTR: CPT | Mod: 26 | Performed by: RADIOLOGY

## 2023-01-01 PROCEDURE — 84295 ASSAY OF SERUM SODIUM: CPT

## 2023-01-01 PROCEDURE — 36416 COLLJ CAPILLARY BLOOD SPEC: CPT | Performed by: PHYSICIAN ASSISTANT

## 2023-01-01 PROCEDURE — 0QB70ZZ EXCISION OF LEFT UPPER FEMUR, OPEN APPROACH: ICD-10-PCS | Performed by: ORTHOPAEDIC SURGERY

## 2023-01-01 PROCEDURE — 370N000017 HC ANESTHESIA TECHNICAL FEE, PER MIN: Performed by: ORTHOPAEDIC SURGERY

## 2023-01-01 PROCEDURE — 86140 C-REACTIVE PROTEIN: CPT | Performed by: INTERNAL MEDICINE

## 2023-01-01 PROCEDURE — 80053 COMPREHEN METABOLIC PANEL: CPT | Performed by: INTERNAL MEDICINE

## 2023-01-01 PROCEDURE — 80202 ASSAY OF VANCOMYCIN: CPT | Performed by: INTERNAL MEDICINE

## 2023-01-01 PROCEDURE — 86140 C-REACTIVE PROTEIN: CPT | Performed by: PEDIATRICS

## 2023-01-01 PROCEDURE — 97530 THERAPEUTIC ACTIVITIES: CPT | Mod: GP

## 2023-01-01 PROCEDURE — 84132 ASSAY OF SERUM POTASSIUM: CPT | Performed by: PHYSICIAN ASSISTANT

## 2023-01-01 PROCEDURE — 97530 THERAPEUTIC ACTIVITIES: CPT | Mod: GP | Performed by: REHABILITATION PRACTITIONER

## 2023-01-01 PROCEDURE — 99231 SBSQ HOSP IP/OBS SF/LOW 25: CPT | Mod: FS | Performed by: PHYSICIAN ASSISTANT

## 2023-01-01 PROCEDURE — 99233 SBSQ HOSP IP/OBS HIGH 50: CPT | Mod: 24 | Performed by: NURSE PRACTITIONER

## 2023-01-01 PROCEDURE — 99232 SBSQ HOSP IP/OBS MODERATE 35: CPT | Mod: 24 | Performed by: INTERNAL MEDICINE

## 2023-01-01 PROCEDURE — 10005 FNA BX W/US GDN 1ST LES: CPT | Mod: 73

## 2023-01-01 PROCEDURE — 36415 COLL VENOUS BLD VENIPUNCTURE: CPT | Performed by: NURSE PRACTITIONER

## 2023-01-01 PROCEDURE — 27040 BIOPSY OF SOFT TISSUES: CPT | Mod: LT | Performed by: ORTHOPAEDIC SURGERY

## 2023-01-01 PROCEDURE — 258N000003 HC RX IP 258 OP 636: Performed by: INTERNAL MEDICINE

## 2023-01-01 PROCEDURE — 99233 SBSQ HOSP IP/OBS HIGH 50: CPT | Performed by: STUDENT IN AN ORGANIZED HEALTH CARE EDUCATION/TRAINING PROGRAM

## 2023-01-01 PROCEDURE — 86923 COMPATIBILITY TEST ELECTRIC: CPT | Performed by: PEDIATRICS

## 2023-01-01 PROCEDURE — 99309 SBSQ NF CARE MODERATE MDM 30: CPT

## 2023-01-01 PROCEDURE — C1751 CATH, INF, PER/CENT/MIDLINE: HCPCS

## 2023-01-01 PROCEDURE — 84100 ASSAY OF PHOSPHORUS: CPT | Performed by: INTERNAL MEDICINE

## 2023-01-01 PROCEDURE — 72040 X-RAY EXAM NECK SPINE 2-3 VW: CPT

## 2023-01-01 PROCEDURE — 84145 PROCALCITONIN (PCT): CPT | Performed by: INTERNAL MEDICINE

## 2023-01-01 PROCEDURE — 99207 EEG VIDEO 2-12 HRS UNMONITORED: CPT | Performed by: PSYCHIATRY & NEUROLOGY

## 2023-01-01 PROCEDURE — 76770 US EXAM ABDO BACK WALL COMP: CPT

## 2023-01-01 PROCEDURE — 85048 AUTOMATED LEUKOCYTE COUNT: CPT | Performed by: STUDENT IN AN ORGANIZED HEALTH CARE EDUCATION/TRAINING PROGRAM

## 2023-01-01 PROCEDURE — 02PY33Z REMOVAL OF INFUSION DEVICE FROM GREAT VESSEL, PERCUTANEOUS APPROACH: ICD-10-PCS | Performed by: PHYSICIAN ASSISTANT

## 2023-01-01 PROCEDURE — 258N000003 HC RX IP 258 OP 636: Performed by: REGISTERED NURSE

## 2023-01-01 PROCEDURE — 250N000012 HC RX MED GY IP 250 OP 636 PS 637: Performed by: INTERNAL MEDICINE

## 2023-01-01 PROCEDURE — 83735 ASSAY OF MAGNESIUM: CPT | Performed by: NURSE PRACTITIONER

## 2023-01-01 PROCEDURE — 250N000011 HC RX IP 250 OP 636: Performed by: REGISTERED NURSE

## 2023-01-01 PROCEDURE — 36415 COLL VENOUS BLD VENIPUNCTURE: CPT

## 2023-01-01 PROCEDURE — 80069 RENAL FUNCTION PANEL: CPT | Performed by: PEDIATRICS

## 2023-01-01 PROCEDURE — 999N000111 HC STATISTIC OT IP EVAL DEFER

## 2023-01-01 PROCEDURE — 85018 HEMOGLOBIN: CPT | Performed by: PEDIATRICS

## 2023-01-01 PROCEDURE — 999N000199 HC STATISTIC WOC PT EDUCATION, 31-45 MIN

## 2023-01-01 PROCEDURE — 86901 BLOOD TYPING SEROLOGIC RH(D): CPT | Performed by: STUDENT IN AN ORGANIZED HEALTH CARE EDUCATION/TRAINING PROGRAM

## 2023-01-01 PROCEDURE — 73610 X-RAY EXAM OF ANKLE: CPT | Mod: LT

## 2023-01-01 PROCEDURE — 99316 NF DSCHRG MGMT 30 MIN+: CPT

## 2023-01-01 PROCEDURE — 022N000001 HC SNF RUG CODE OPNP

## 2023-01-01 PROCEDURE — 76770 US EXAM ABDO BACK WALL COMP: CPT | Mod: 26 | Performed by: RADIOLOGY

## 2023-01-01 PROCEDURE — 80202 ASSAY OF VANCOMYCIN: CPT | Performed by: PEDIATRICS

## 2023-01-01 PROCEDURE — 99306 1ST NF CARE HIGH MDM 50: CPT | Performed by: INTERNAL MEDICINE

## 2023-01-01 PROCEDURE — 999N000150 HC STATISTIC PT MED CONFERENCE < 30 MIN: Performed by: PHYSICAL THERAPIST

## 2023-01-01 PROCEDURE — 36415 COLL VENOUS BLD VENIPUNCTURE: CPT | Performed by: PEDIATRICS

## 2023-01-01 PROCEDURE — 76882 US LMTD JT/FCL EVL NVASC XTR: CPT | Mod: LT

## 2023-01-01 PROCEDURE — 99024 POSTOP FOLLOW-UP VISIT: CPT | Performed by: ORTHOPAEDIC SURGERY

## 2023-01-01 PROCEDURE — 255N000002 HC RX 255 OP 636: Performed by: INTERNAL MEDICINE

## 2023-01-01 PROCEDURE — 999N000063 XR PELVIS PORT 1/2 VIEWS

## 2023-01-01 PROCEDURE — 258N000001 HC RX 258: Performed by: ORTHOPAEDIC SURGERY

## 2023-01-01 PROCEDURE — 250N000009 HC RX 250: Performed by: REGISTERED NURSE

## 2023-01-01 PROCEDURE — 80048 BASIC METABOLIC PNL TOTAL CA: CPT

## 2023-01-01 PROCEDURE — 85652 RBC SED RATE AUTOMATED: CPT | Performed by: INTERNAL MEDICINE

## 2023-01-01 PROCEDURE — 250N000025 HC SEVOFLURANE, PER MIN: Performed by: ORTHOPAEDIC SURGERY

## 2023-01-01 PROCEDURE — 73700 CT LOWER EXTREMITY W/O DYE: CPT | Mod: LT

## 2023-01-01 PROCEDURE — 250N000009 HC RX 250: Performed by: RADIOLOGY

## 2023-01-01 PROCEDURE — 0JH63XZ INSERTION OF TUNNELED VASCULAR ACCESS DEVICE INTO CHEST SUBCUTANEOUS TISSUE AND FASCIA, PERCUTANEOUS APPROACH: ICD-10-PCS | Performed by: PHYSICIAN ASSISTANT

## 2023-01-01 PROCEDURE — 99418 PROLNG IP/OBS E/M EA 15 MIN: CPT | Performed by: STUDENT IN AN ORGANIZED HEALTH CARE EDUCATION/TRAINING PROGRAM

## 2023-01-01 PROCEDURE — 99305 1ST NF CARE MODERATE MDM 35: CPT | Performed by: PHYSICIAN ASSISTANT

## 2023-01-01 PROCEDURE — 02H633Z INSERTION OF INFUSION DEVICE INTO RIGHT ATRIUM, PERCUTANEOUS APPROACH: ICD-10-PCS | Performed by: RADIOLOGY

## 2023-01-01 PROCEDURE — 85004 AUTOMATED DIFF WBC COUNT: CPT

## 2023-01-01 PROCEDURE — 85025 COMPLETE CBC W/AUTO DIFF WBC: CPT | Performed by: INTERNAL MEDICINE

## 2023-01-01 PROCEDURE — 86160 COMPLEMENT ANTIGEN: CPT | Performed by: PHYSICIAN ASSISTANT

## 2023-01-01 PROCEDURE — 97110 THERAPEUTIC EXERCISES: CPT | Mod: GP | Performed by: PHYSICAL THERAPIST

## 2023-01-01 PROCEDURE — 0SPB08Z REMOVAL OF SPACER FROM LEFT HIP JOINT, OPEN APPROACH: ICD-10-PCS | Performed by: ORTHOPAEDIC SURGERY

## 2023-01-01 PROCEDURE — 84443 ASSAY THYROID STIM HORMONE: CPT

## 2023-01-01 PROCEDURE — 80048 BASIC METABOLIC PNL TOTAL CA: CPT | Performed by: INTERNAL MEDICINE

## 2023-01-01 PROCEDURE — 82248 BILIRUBIN DIRECT: CPT | Performed by: PHYSICIAN ASSISTANT

## 2023-01-01 PROCEDURE — 97161 PT EVAL LOW COMPLEX 20 MIN: CPT | Mod: GP | Performed by: PHYSICAL THERAPIST

## 2023-01-01 PROCEDURE — 97167 OT EVAL HIGH COMPLEX 60 MIN: CPT | Mod: GO

## 2023-01-01 PROCEDURE — 81001 URINALYSIS AUTO W/SCOPE: CPT | Performed by: PEDIATRICS

## 2023-01-01 PROCEDURE — 89050 BODY FLUID CELL COUNT: CPT | Performed by: ORTHOPAEDIC SURGERY

## 2023-01-01 PROCEDURE — 99152 MOD SED SAME PHYS/QHP 5/>YRS: CPT | Performed by: INTERNAL MEDICINE

## 2023-01-01 PROCEDURE — 250N000011 HC RX IP 250 OP 636: Mod: JZ | Performed by: PHYSICIAN ASSISTANT

## 2023-01-01 PROCEDURE — 99207 PR NOT IN PERSON INPATIENT CONSULT STATISTICAL MARKER: CPT | Mod: 25 | Performed by: INTERNAL MEDICINE

## 2023-01-01 PROCEDURE — 99233 SBSQ HOSP IP/OBS HIGH 50: CPT | Performed by: CLINICAL NURSE SPECIALIST

## 2023-01-01 PROCEDURE — 250N000011 HC RX IP 250 OP 636: Mod: JZ | Performed by: NURSE ANESTHETIST, CERTIFIED REGISTERED

## 2023-01-01 PROCEDURE — 258N000003 HC RX IP 258 OP 636: Performed by: PEDIATRICS

## 2023-01-01 PROCEDURE — 99232 SBSQ HOSP IP/OBS MODERATE 35: CPT | Mod: 24 | Performed by: NURSE PRACTITIONER

## 2023-01-01 PROCEDURE — 85014 HEMATOCRIT: CPT | Performed by: PHYSICIAN ASSISTANT

## 2023-01-01 PROCEDURE — 87070 CULTURE OTHR SPECIMN AEROBIC: CPT | Performed by: ORTHOPAEDIC SURGERY

## 2023-01-01 PROCEDURE — 99310 SBSQ NF CARE HIGH MDM 45: CPT | Performed by: PHYSICIAN ASSISTANT

## 2023-01-01 PROCEDURE — 72156 MRI NECK SPINE W/O & W/DYE: CPT | Mod: 26 | Performed by: RADIOLOGY

## 2023-01-01 PROCEDURE — 86850 RBC ANTIBODY SCREEN: CPT | Performed by: PHYSICIAN ASSISTANT

## 2023-01-01 PROCEDURE — 87077 CULTURE AEROBIC IDENTIFY: CPT | Performed by: STUDENT IN AN ORGANIZED HEALTH CARE EDUCATION/TRAINING PROGRAM

## 2023-01-01 PROCEDURE — 250N000009 HC RX 250: Performed by: NURSE ANESTHETIST, CERTIFIED REGISTERED

## 2023-01-01 PROCEDURE — 82610 CYSTATIN C: CPT

## 2023-01-01 PROCEDURE — G0463 HOSPITAL OUTPT CLINIC VISIT: HCPCS | Mod: 25

## 2023-01-01 PROCEDURE — 85004 AUTOMATED DIFF WBC COUNT: CPT | Performed by: STUDENT IN AN ORGANIZED HEALTH CARE EDUCATION/TRAINING PROGRAM

## 2023-01-01 PROCEDURE — 70450 CT HEAD/BRAIN W/O DYE: CPT | Mod: 26 | Performed by: RADIOLOGY

## 2023-01-01 PROCEDURE — 99207 PR CDG-CUT & PASTE-POTENTIAL IMPACT ON LEVEL: CPT | Performed by: STUDENT IN AN ORGANIZED HEALTH CARE EDUCATION/TRAINING PROGRAM

## 2023-01-01 PROCEDURE — 70450 CT HEAD/BRAIN W/O DYE: CPT

## 2023-01-01 PROCEDURE — 99418 PROLNG IP/OBS E/M EA 15 MIN: CPT | Mod: 24 | Performed by: INTERNAL MEDICINE

## 2023-01-01 PROCEDURE — 36558 INSERT TUNNELED CV CATH: CPT | Performed by: PHYSICIAN ASSISTANT

## 2023-01-01 PROCEDURE — 73650 X-RAY EXAM OF HEEL: CPT | Mod: LT

## 2023-01-01 PROCEDURE — 82040 ASSAY OF SERUM ALBUMIN: CPT | Performed by: PHYSICIAN ASSISTANT

## 2023-01-01 PROCEDURE — 99233 SBSQ HOSP IP/OBS HIGH 50: CPT | Performed by: PEDIATRICS

## 2023-01-01 PROCEDURE — 73502 X-RAY EXAM HIP UNI 2-3 VIEWS: CPT | Mod: 26 | Performed by: RADIOLOGY

## 2023-01-01 PROCEDURE — 99310 SBSQ NF CARE HIGH MDM 45: CPT | Performed by: NURSE PRACTITIONER

## 2023-01-01 PROCEDURE — 83735 ASSAY OF MAGNESIUM: CPT | Performed by: PEDIATRICS

## 2023-01-01 PROCEDURE — 85018 HEMOGLOBIN: CPT | Performed by: ORTHOPAEDIC SURGERY

## 2023-01-01 PROCEDURE — 82803 BLOOD GASES ANY COMBINATION: CPT | Performed by: STUDENT IN AN ORGANIZED HEALTH CARE EDUCATION/TRAINING PROGRAM

## 2023-01-01 PROCEDURE — 84295 ASSAY OF SERUM SODIUM: CPT | Performed by: PHYSICIAN ASSISTANT

## 2023-01-01 PROCEDURE — 87176 TISSUE HOMOGENIZATION CULTR: CPT | Performed by: ORTHOPAEDIC SURGERY

## 2023-01-01 PROCEDURE — 83735 ASSAY OF MAGNESIUM: CPT | Performed by: STUDENT IN AN ORGANIZED HEALTH CARE EDUCATION/TRAINING PROGRAM

## 2023-01-01 PROCEDURE — 36416 COLLJ CAPILLARY BLOOD SPEC: CPT | Performed by: INTERNAL MEDICINE

## 2023-01-01 PROCEDURE — 99418 PROLNG IP/OBS E/M EA 15 MIN: CPT | Performed by: NURSE PRACTITIONER

## 2023-01-01 PROCEDURE — 82947 ASSAY GLUCOSE BLOOD QUANT: CPT | Performed by: PHYSICIAN ASSISTANT

## 2023-01-01 PROCEDURE — 999N000007 HC SITE CHECK

## 2023-01-01 PROCEDURE — 84300 ASSAY OF URINE SODIUM: CPT | Performed by: STUDENT IN AN ORGANIZED HEALTH CARE EDUCATION/TRAINING PROGRAM

## 2023-01-01 PROCEDURE — 250N000009 HC RX 250: Performed by: INTERNAL MEDICINE

## 2023-01-01 PROCEDURE — 82565 ASSAY OF CREATININE: CPT | Performed by: STUDENT IN AN ORGANIZED HEALTH CARE EDUCATION/TRAINING PROGRAM

## 2023-01-01 PROCEDURE — 87075 CULTR BACTERIA EXCEPT BLOOD: CPT | Performed by: ORTHOPAEDIC SURGERY

## 2023-01-01 PROCEDURE — 83880 ASSAY OF NATRIURETIC PEPTIDE: CPT | Performed by: INTERNAL MEDICINE

## 2023-01-01 PROCEDURE — 99152 MOD SED SAME PHYS/QHP 5/>YRS: CPT | Performed by: RADIOLOGY

## 2023-01-01 PROCEDURE — 99233 SBSQ HOSP IP/OBS HIGH 50: CPT | Performed by: FAMILY MEDICINE

## 2023-01-01 PROCEDURE — 86850 RBC ANTIBODY SCREEN: CPT | Performed by: NURSE ANESTHETIST, CERTIFIED REGISTERED

## 2023-01-01 PROCEDURE — 999N000129 HC STATISTIC PICC/MID LINE PROC CANCELLED SUBQ WORKUP

## 2023-01-01 PROCEDURE — 97166 OT EVAL MOD COMPLEX 45 MIN: CPT | Mod: GO

## 2023-01-01 PROCEDURE — 73700 CT LOWER EXTREMITY W/O DYE: CPT | Mod: 26 | Performed by: RADIOLOGY

## 2023-01-01 PROCEDURE — 99233 SBSQ HOSP IP/OBS HIGH 50: CPT

## 2023-01-01 PROCEDURE — 99307 SBSQ NF CARE SF MDM 10: CPT | Performed by: PHYSICIAN ASSISTANT

## 2023-01-01 PROCEDURE — 97110 THERAPEUTIC EXERCISES: CPT | Mod: GO | Performed by: OCCUPATIONAL THERAPIST

## 2023-01-01 PROCEDURE — 84300 ASSAY OF URINE SODIUM: CPT | Performed by: PHYSICIAN ASSISTANT

## 2023-01-01 PROCEDURE — 0JH63XZ INSERTION OF TUNNELED VASCULAR ACCESS DEVICE INTO CHEST SUBCUTANEOUS TISSUE AND FASCIA, PERCUTANEOUS APPROACH: ICD-10-PCS | Performed by: RADIOLOGY

## 2023-01-01 PROCEDURE — 85045 AUTOMATED RETICULOCYTE COUNT: CPT | Performed by: INTERNAL MEDICINE

## 2023-01-01 PROCEDURE — 83036 HEMOGLOBIN GLYCOSYLATED A1C: CPT | Performed by: PHYSICIAN ASSISTANT

## 2023-01-01 PROCEDURE — 250N000011 HC RX IP 250 OP 636: Performed by: ORTHOPAEDIC SURGERY

## 2023-01-01 PROCEDURE — 86923 COMPATIBILITY TEST ELECTRIC: CPT | Performed by: STUDENT IN AN ORGANIZED HEALTH CARE EDUCATION/TRAINING PROGRAM

## 2023-01-01 PROCEDURE — 36592 COLLECT BLOOD FROM PICC: CPT | Performed by: NURSE PRACTITIONER

## 2023-01-01 PROCEDURE — 250N000009 HC RX 250: Performed by: ORTHOPAEDIC SURGERY

## 2023-01-01 PROCEDURE — 86923 COMPATIBILITY TEST ELECTRIC: CPT

## 2023-01-01 PROCEDURE — 71045 X-RAY EXAM CHEST 1 VIEW: CPT

## 2023-01-01 PROCEDURE — 0SJB3ZZ INSPECTION OF LEFT HIP JOINT, PERCUTANEOUS APPROACH: ICD-10-PCS | Performed by: PHYSICIAN ASSISTANT

## 2023-01-01 PROCEDURE — 99306 1ST NF CARE HIGH MDM 50: CPT | Mod: 24 | Performed by: STUDENT IN AN ORGANIZED HEALTH CARE EDUCATION/TRAINING PROGRAM

## 2023-01-01 PROCEDURE — C1769 GUIDE WIRE: HCPCS

## 2023-01-01 PROCEDURE — 81001 URINALYSIS AUTO W/SCOPE: CPT | Performed by: STUDENT IN AN ORGANIZED HEALTH CARE EDUCATION/TRAINING PROGRAM

## 2023-01-01 PROCEDURE — 99232 SBSQ HOSP IP/OBS MODERATE 35: CPT | Performed by: STUDENT IN AN ORGANIZED HEALTH CARE EDUCATION/TRAINING PROGRAM

## 2023-01-01 PROCEDURE — 99232 SBSQ HOSP IP/OBS MODERATE 35: CPT | Performed by: PEDIATRICS

## 2023-01-01 PROCEDURE — 99232 SBSQ HOSP IP/OBS MODERATE 35: CPT | Mod: 24 | Performed by: PHYSICIAN ASSISTANT

## 2023-01-01 PROCEDURE — 83605 ASSAY OF LACTIC ACID: CPT | Performed by: INTERNAL MEDICINE

## 2023-01-01 PROCEDURE — 82607 VITAMIN B-12: CPT | Performed by: INTERNAL MEDICINE

## 2023-01-01 PROCEDURE — 82040 ASSAY OF SERUM ALBUMIN: CPT | Performed by: STUDENT IN AN ORGANIZED HEALTH CARE EDUCATION/TRAINING PROGRAM

## 2023-01-01 PROCEDURE — 87088 URINE BACTERIA CULTURE: CPT

## 2023-01-01 PROCEDURE — 85025 COMPLETE CBC W/AUTO DIFF WBC: CPT

## 2023-01-01 PROCEDURE — 83735 ASSAY OF MAGNESIUM: CPT | Performed by: PHYSICIAN ASSISTANT

## 2023-01-01 PROCEDURE — 99207 PR CDG-CUT & PASTE-POTENTIAL IMPACT ON LEVEL: CPT | Performed by: INTERNAL MEDICINE

## 2023-01-01 PROCEDURE — 85018 HEMOGLOBIN: CPT | Performed by: STUDENT IN AN ORGANIZED HEALTH CARE EDUCATION/TRAINING PROGRAM

## 2023-01-01 PROCEDURE — C1713 ANCHOR/SCREW BN/BN,TIS/BN: HCPCS | Performed by: ORTHOPAEDIC SURGERY

## 2023-01-01 PROCEDURE — 82140 ASSAY OF AMMONIA: CPT | Performed by: STUDENT IN AN ORGANIZED HEALTH CARE EDUCATION/TRAINING PROGRAM

## 2023-01-01 PROCEDURE — 82374 ASSAY BLOOD CARBON DIOXIDE: CPT | Performed by: INTERNAL MEDICINE

## 2023-01-01 PROCEDURE — 99222 1ST HOSP IP/OBS MODERATE 55: CPT | Performed by: INTERNAL MEDICINE

## 2023-01-01 PROCEDURE — 250N000011 HC RX IP 250 OP 636: Mod: JZ | Performed by: PSYCHIATRY & NEUROLOGY

## 2023-01-01 PROCEDURE — 999N000127 HC STATISTIC PERIPHERAL IV START W US GUIDANCE

## 2023-01-01 PROCEDURE — 99221 1ST HOSP IP/OBS SF/LOW 40: CPT | Mod: 24 | Performed by: PSYCHIATRY & NEUROLOGY

## 2023-01-01 PROCEDURE — 85049 AUTOMATED PLATELET COUNT: CPT | Performed by: INTERNAL MEDICINE

## 2023-01-01 PROCEDURE — 80069 RENAL FUNCTION PANEL: CPT

## 2023-01-01 PROCEDURE — 20610 DRAIN/INJ JOINT/BURSA W/O US: CPT | Mod: LT | Performed by: PHYSICIAN ASSISTANT

## 2023-01-01 PROCEDURE — 85610 PROTHROMBIN TIME: CPT | Performed by: STUDENT IN AN ORGANIZED HEALTH CARE EDUCATION/TRAINING PROGRAM

## 2023-01-01 PROCEDURE — 97110 THERAPEUTIC EXERCISES: CPT | Mod: GO

## 2023-01-01 PROCEDURE — 0JPT3XZ REMOVAL OF TUNNELED VASCULAR ACCESS DEVICE FROM TRUNK SUBCUTANEOUS TISSUE AND FASCIA, PERCUTANEOUS APPROACH: ICD-10-PCS | Performed by: RADIOLOGY

## 2023-01-01 PROCEDURE — 86140 C-REACTIVE PROTEIN: CPT | Performed by: ORTHOPAEDIC SURGERY

## 2023-01-01 PROCEDURE — 77001 FLUOROGUIDE FOR VEIN DEVICE: CPT | Mod: 26 | Performed by: PHYSICIAN ASSISTANT

## 2023-01-01 PROCEDURE — 83735 ASSAY OF MAGNESIUM: CPT | Performed by: ORTHOPAEDIC SURGERY

## 2023-01-01 PROCEDURE — 77001 FLUOROGUIDE FOR VEIN DEVICE: CPT | Mod: 26 | Performed by: RADIOLOGY

## 2023-01-01 PROCEDURE — 87077 CULTURE AEROBIC IDENTIFY: CPT | Mod: 59

## 2023-01-01 PROCEDURE — 93306 TTE W/DOPPLER COMPLETE: CPT | Mod: 26 | Performed by: STUDENT IN AN ORGANIZED HEALTH CARE EDUCATION/TRAINING PROGRAM

## 2023-01-01 PROCEDURE — 82310 ASSAY OF CALCIUM: CPT | Performed by: PHYSICIAN ASSISTANT

## 2023-01-01 PROCEDURE — 95718 EEG PHYS/QHP 2-12 HR W/VEEG: CPT | Performed by: PSYCHIATRY & NEUROLOGY

## 2023-01-01 PROCEDURE — 99310 SBSQ NF CARE HIGH MDM 45: CPT | Mod: 24 | Performed by: STUDENT IN AN ORGANIZED HEALTH CARE EDUCATION/TRAINING PROGRAM

## 2023-01-01 PROCEDURE — 84520 ASSAY OF UREA NITROGEN: CPT | Performed by: PHYSICIAN ASSISTANT

## 2023-01-01 PROCEDURE — 82570 ASSAY OF URINE CREATININE: CPT | Performed by: PEDIATRICS

## 2023-01-01 PROCEDURE — 710N000012 HC RECOVERY PHASE 2, PER MINUTE: Performed by: ORTHOPAEDIC SURGERY

## 2023-01-01 PROCEDURE — 20611 DRAIN/INJ JOINT/BURSA W/US: CPT | Mod: LT | Performed by: ORTHOPAEDIC SURGERY

## 2023-01-01 PROCEDURE — 85014 HEMATOCRIT: CPT | Performed by: INTERNAL MEDICINE

## 2023-01-01 PROCEDURE — 83036 HEMOGLOBIN GLYCOSYLATED A1C: CPT

## 2023-01-01 PROCEDURE — 99223 1ST HOSP IP/OBS HIGH 75: CPT | Performed by: CLINICAL NURSE SPECIALIST

## 2023-01-01 PROCEDURE — 85025 COMPLETE CBC W/AUTO DIFF WBC: CPT | Performed by: PHYSICIAN ASSISTANT

## 2023-01-01 PROCEDURE — 74018 RADEX ABDOMEN 1 VIEW: CPT

## 2023-01-01 PROCEDURE — 83735 ASSAY OF MAGNESIUM: CPT | Performed by: PSYCHIATRY & NEUROLOGY

## 2023-01-01 PROCEDURE — 02HV33Z INSERTION OF INFUSION DEVICE INTO SUPERIOR VENA CAVA, PERCUTANEOUS APPROACH: ICD-10-PCS | Performed by: PHYSICIAN ASSISTANT

## 2023-01-01 PROCEDURE — 99231 SBSQ HOSP IP/OBS SF/LOW 25: CPT | Mod: 24 | Performed by: PHYSICIAN ASSISTANT

## 2023-01-01 PROCEDURE — 360N000083 HC SURGERY LEVEL 3 W/ FLUORO, PER MIN: Performed by: ORTHOPAEDIC SURGERY

## 2023-01-01 PROCEDURE — 82565 ASSAY OF CREATININE: CPT | Performed by: INTERNAL MEDICINE

## 2023-01-01 PROCEDURE — 999N000147 HC STATISTIC PT IP EVAL DEFER

## 2023-01-01 PROCEDURE — 82805 BLOOD GASES W/O2 SATURATION: CPT

## 2023-01-01 PROCEDURE — 74018 RADEX ABDOMEN 1 VIEW: CPT | Mod: 26 | Performed by: RADIOLOGY

## 2023-01-01 PROCEDURE — 84466 ASSAY OF TRANSFERRIN: CPT | Performed by: PEDIATRICS

## 2023-01-01 PROCEDURE — C1776 JOINT DEVICE (IMPLANTABLE): HCPCS | Performed by: ORTHOPAEDIC SURGERY

## 2023-01-01 PROCEDURE — 86901 BLOOD TYPING SEROLOGIC RH(D): CPT | Performed by: NURSE ANESTHETIST, CERTIFIED REGISTERED

## 2023-01-01 PROCEDURE — 36589 REMOVAL TUNNELED CV CATH: CPT

## 2023-01-01 PROCEDURE — 99222 1ST HOSP IP/OBS MODERATE 55: CPT | Mod: FS | Performed by: PHYSICIAN ASSISTANT

## 2023-01-01 PROCEDURE — 85025 COMPLETE CBC W/AUTO DIFF WBC: CPT | Performed by: STUDENT IN AN ORGANIZED HEALTH CARE EDUCATION/TRAINING PROGRAM

## 2023-01-01 PROCEDURE — 84132 ASSAY OF SERUM POTASSIUM: CPT

## 2023-01-01 PROCEDURE — 5A09357 ASSISTANCE WITH RESPIRATORY VENTILATION, LESS THAN 24 CONSECUTIVE HOURS, CONTINUOUS POSITIVE AIRWAY PRESSURE: ICD-10-PCS | Performed by: PHYSICIAN ASSISTANT

## 2023-01-01 PROCEDURE — 999N000128 HC STATISTIC PERIPHERAL IV START W/O US GUIDANCE

## 2023-01-01 PROCEDURE — 85652 RBC SED RATE AUTOMATED: CPT | Performed by: PHYSICIAN ASSISTANT

## 2023-01-01 PROCEDURE — 258N000002 HC RX IP 258 OP 250

## 2023-01-01 PROCEDURE — 83880 ASSAY OF NATRIURETIC PEPTIDE: CPT

## 2023-01-01 PROCEDURE — 11719 TRIM NAIL(S) ANY NUMBER: CPT | Mod: Q8 | Performed by: PODIATRIST

## 2023-01-01 PROCEDURE — 85014 HEMATOCRIT: CPT

## 2023-01-01 PROCEDURE — 99207 PR NOT IN PERSON INPATIENT CONSULT STATISTICAL MARKER: CPT | Mod: GC | Performed by: INTERNAL MEDICINE

## 2023-01-01 PROCEDURE — 83516 IMMUNOASSAY NONANTIBODY: CPT | Performed by: ORTHOPAEDIC SURGERY

## 2023-01-01 PROCEDURE — 99215 OFFICE O/P EST HI 40 MIN: CPT | Performed by: REGISTERED NURSE

## 2023-01-01 PROCEDURE — A9585 GADOBUTROL INJECTION: HCPCS | Performed by: INTERNAL MEDICINE

## 2023-01-01 PROCEDURE — 84132 ASSAY OF SERUM POTASSIUM: CPT | Performed by: STUDENT IN AN ORGANIZED HEALTH CARE EDUCATION/TRAINING PROGRAM

## 2023-01-01 PROCEDURE — 250N000011 HC RX IP 250 OP 636: Mod: JZ | Performed by: RADIOLOGY

## 2023-01-01 PROCEDURE — 80053 COMPREHEN METABOLIC PANEL: CPT | Performed by: PHYSICIAN ASSISTANT

## 2023-01-01 PROCEDURE — 86901 BLOOD TYPING SEROLOGIC RH(D): CPT | Performed by: PEDIATRICS

## 2023-01-01 PROCEDURE — 999N000180 XR SURGERY CARM FLUORO LESS THAN 5 MIN

## 2023-01-01 PROCEDURE — 80053 COMPREHEN METABOLIC PANEL: CPT | Performed by: STUDENT IN AN ORGANIZED HEALTH CARE EDUCATION/TRAINING PROGRAM

## 2023-01-01 PROCEDURE — 99316 NF DSCHRG MGMT 30 MIN+: CPT | Performed by: PHYSICIAN ASSISTANT

## 2023-01-01 PROCEDURE — 84145 PROCALCITONIN (PCT): CPT

## 2023-01-01 PROCEDURE — 87149 DNA/RNA DIRECT PROBE: CPT

## 2023-01-01 PROCEDURE — 80048 BASIC METABOLIC PNL TOTAL CA: CPT | Performed by: NURSE PRACTITIONER

## 2023-01-01 PROCEDURE — 99239 HOSP IP/OBS DSCHRG MGMT >30: CPT | Performed by: INTERNAL MEDICINE

## 2023-01-01 PROCEDURE — 250N000011 HC RX IP 250 OP 636: Performed by: ANESTHESIOLOGY

## 2023-01-01 PROCEDURE — 77002 NEEDLE LOCALIZATION BY XRAY: CPT | Mod: 26 | Performed by: PHYSICIAN ASSISTANT

## 2023-01-01 PROCEDURE — 84999 UNLISTED CHEMISTRY PROCEDURE: CPT | Performed by: ORTHOPAEDIC SURGERY

## 2023-01-01 PROCEDURE — 36600 WITHDRAWAL OF ARTERIAL BLOOD: CPT

## 2023-01-01 PROCEDURE — 82746 ASSAY OF FOLIC ACID SERUM: CPT | Performed by: INTERNAL MEDICINE

## 2023-01-01 PROCEDURE — 258N000003 HC RX IP 258 OP 636: Performed by: ANESTHESIOLOGY

## 2023-01-01 PROCEDURE — 82607 VITAMIN B-12: CPT | Performed by: PEDIATRICS

## 2023-01-01 PROCEDURE — 0JPT3XZ REMOVAL OF TUNNELED VASCULAR ACCESS DEVICE FROM TRUNK SUBCUTANEOUS TISSUE AND FASCIA, PERCUTANEOUS APPROACH: ICD-10-PCS | Performed by: PHYSICIAN ASSISTANT

## 2023-01-01 PROCEDURE — 80053 COMPREHEN METABOLIC PANEL: CPT

## 2023-01-01 PROCEDURE — 999N000208 ECHOCARDIOGRAM COMPLETE

## 2023-01-01 PROCEDURE — 999N000150 HC STATISTIC PT MED CONFERENCE < 30 MIN

## 2023-01-01 PROCEDURE — 3E033XZ INTRODUCTION OF VASOPRESSOR INTO PERIPHERAL VEIN, PERCUTANEOUS APPROACH: ICD-10-PCS | Performed by: INTERNAL MEDICINE

## 2023-01-01 PROCEDURE — 99207 PR NO BILLABLE SERVICE THIS VISIT: CPT | Performed by: STUDENT IN AN ORGANIZED HEALTH CARE EDUCATION/TRAINING PROGRAM

## 2023-01-01 PROCEDURE — 36416 COLLJ CAPILLARY BLOOD SPEC: CPT | Performed by: STUDENT IN AN ORGANIZED HEALTH CARE EDUCATION/TRAINING PROGRAM

## 2023-01-01 PROCEDURE — 84156 ASSAY OF PROTEIN URINE: CPT | Performed by: PHYSICIAN ASSISTANT

## 2023-01-01 PROCEDURE — 73130 X-RAY EXAM OF HAND: CPT | Mod: RT

## 2023-01-01 PROCEDURE — 87077 CULTURE AEROBIC IDENTIFY: CPT | Performed by: ORTHOPAEDIC SURGERY

## 2023-01-01 PROCEDURE — G0463 HOSPITAL OUTPT CLINIC VISIT: HCPCS | Performed by: REGISTERED NURSE

## 2023-01-01 PROCEDURE — 84295 ASSAY OF SERUM SODIUM: CPT | Performed by: INTERNAL MEDICINE

## 2023-01-01 PROCEDURE — 82565 ASSAY OF CREATININE: CPT | Performed by: PEDIATRICS

## 2023-01-01 PROCEDURE — 272N000504 HC NEEDLE CR4

## 2023-01-01 PROCEDURE — 82962 GLUCOSE BLOOD TEST: CPT

## 2023-01-01 PROCEDURE — 250N000012 HC RX MED GY IP 250 OP 636 PS 637: Performed by: PHYSICIAN ASSISTANT

## 2023-01-01 PROCEDURE — 83735 ASSAY OF MAGNESIUM: CPT

## 2023-01-01 PROCEDURE — 86850 RBC ANTIBODY SCREEN: CPT | Performed by: PEDIATRICS

## 2023-01-01 PROCEDURE — 99232 SBSQ HOSP IP/OBS MODERATE 35: CPT | Mod: 24 | Performed by: PSYCHIATRY & NEUROLOGY

## 2023-01-01 PROCEDURE — 82803 BLOOD GASES ANY COMBINATION: CPT | Performed by: PHYSICIAN ASSISTANT

## 2023-01-01 PROCEDURE — 72040 X-RAY EXAM NECK SPINE 2-3 VW: CPT | Mod: 26 | Performed by: RADIOLOGY

## 2023-01-01 PROCEDURE — 82310 ASSAY OF CALCIUM: CPT

## 2023-01-01 PROCEDURE — 250N000012 HC RX MED GY IP 250 OP 636 PS 637

## 2023-01-01 PROCEDURE — 36589 REMOVAL TUNNELED CV CATH: CPT | Performed by: PHYSICIAN ASSISTANT

## 2023-01-01 PROCEDURE — 82435 ASSAY OF BLOOD CHLORIDE: CPT | Performed by: STUDENT IN AN ORGANIZED HEALTH CARE EDUCATION/TRAINING PROGRAM

## 2023-01-01 PROCEDURE — 87086 URINE CULTURE/COLONY COUNT: CPT

## 2023-01-01 PROCEDURE — 82570 ASSAY OF URINE CREATININE: CPT | Performed by: PHYSICIAN ASSISTANT

## 2023-01-01 PROCEDURE — 0S9B3ZZ DRAINAGE OF LEFT HIP JOINT, PERCUTANEOUS APPROACH: ICD-10-PCS | Performed by: ORTHOPAEDIC SURGERY

## 2023-01-01 PROCEDURE — 82565 ASSAY OF CREATININE: CPT | Performed by: PHYSICIAN ASSISTANT

## 2023-01-01 PROCEDURE — 250N000011 HC RX IP 250 OP 636: Mod: JZ

## 2023-01-01 PROCEDURE — 84100 ASSAY OF PHOSPHORUS: CPT | Performed by: PEDIATRICS

## 2023-01-01 PROCEDURE — 86140 C-REACTIVE PROTEIN: CPT

## 2023-01-01 PROCEDURE — 99366 TEAM CONF W/PAT BY HC PROF: CPT | Performed by: INTERNAL MEDICINE

## 2023-01-01 PROCEDURE — 82728 ASSAY OF FERRITIN: CPT | Performed by: PEDIATRICS

## 2023-01-01 PROCEDURE — 255N000002 HC RX 255 OP 636: Performed by: STUDENT IN AN ORGANIZED HEALTH CARE EDUCATION/TRAINING PROGRAM

## 2023-01-01 PROCEDURE — P9016 RBC LEUKOCYTES REDUCED: HCPCS | Performed by: PEDIATRICS

## 2023-01-01 PROCEDURE — 76937 US GUIDE VASCULAR ACCESS: CPT | Mod: 26 | Performed by: RADIOLOGY

## 2023-01-01 PROCEDURE — 93325 DOPPLER ECHO COLOR FLOW MAPG: CPT | Mod: 26 | Performed by: INTERNAL MEDICINE

## 2023-01-01 PROCEDURE — 36558 INSERT TUNNELED CV CATH: CPT

## 2023-01-01 PROCEDURE — 80051 ELECTROLYTE PANEL: CPT

## 2023-01-01 PROCEDURE — 999N000157 HC STATISTIC RCP TIME EA 10 MIN

## 2023-01-01 PROCEDURE — 82306 VITAMIN D 25 HYDROXY: CPT

## 2023-01-01 PROCEDURE — 999N000040 HC STATISTIC CONSULT NO CHARGE VASC ACCESS

## 2023-01-01 PROCEDURE — 80061 LIPID PANEL: CPT | Performed by: INTERNAL MEDICINE

## 2023-01-01 PROCEDURE — 99214 OFFICE O/P EST MOD 30 MIN: CPT | Performed by: ORTHOPAEDIC SURGERY

## 2023-01-01 PROCEDURE — 84132 ASSAY OF SERUM POTASSIUM: CPT | Performed by: PSYCHIATRY & NEUROLOGY

## 2023-01-01 PROCEDURE — 87635 SARS-COV-2 COVID-19 AMP PRB: CPT

## 2023-01-01 PROCEDURE — 5A09357 ASSISTANCE WITH RESPIRATORY VENTILATION, LESS THAN 24 CONSECUTIVE HOURS, CONTINUOUS POSITIVE AIRWAY PRESSURE: ICD-10-PCS | Performed by: NURSE PRACTITIONER

## 2023-01-01 PROCEDURE — 84132 ASSAY OF SERUM POTASSIUM: CPT | Performed by: ORTHOPAEDIC SURGERY

## 2023-01-01 PROCEDURE — 999N000065 XR PELVIS AND HIP PORTABLE LEFT 1 VIEW

## 2023-01-01 PROCEDURE — 73502 X-RAY EXAM HIP UNI 2-3 VIEWS: CPT

## 2023-01-01 PROCEDURE — 82947 ASSAY GLUCOSE BLOOD QUANT: CPT | Performed by: STUDENT IN AN ORGANIZED HEALTH CARE EDUCATION/TRAINING PROGRAM

## 2023-01-01 PROCEDURE — 72156 MRI NECK SPINE W/O & W/DYE: CPT

## 2023-01-01 PROCEDURE — 36591 DRAW BLOOD OFF VENOUS DEVICE: CPT | Performed by: PSYCHIATRY & NEUROLOGY

## 2023-01-01 PROCEDURE — 83550 IRON BINDING TEST: CPT | Performed by: INTERNAL MEDICINE

## 2023-01-01 PROCEDURE — 85041 AUTOMATED RBC COUNT: CPT | Performed by: PHYSICIAN ASSISTANT

## 2023-01-01 PROCEDURE — 83935 ASSAY OF URINE OSMOLALITY: CPT | Performed by: PEDIATRICS

## 2023-01-01 PROCEDURE — 99223 1ST HOSP IP/OBS HIGH 75: CPT | Performed by: INTERNAL MEDICINE

## 2023-01-01 PROCEDURE — 93320 DOPPLER ECHO COMPLETE: CPT | Mod: 26 | Performed by: INTERNAL MEDICINE

## 2023-01-01 PROCEDURE — 82310 ASSAY OF CALCIUM: CPT | Performed by: INTERNAL MEDICINE

## 2023-01-01 PROCEDURE — 76770 US EXAM ABDO BACK WALL COMP: CPT | Mod: 26 | Performed by: STUDENT IN AN ORGANIZED HEALTH CARE EDUCATION/TRAINING PROGRAM

## 2023-01-01 PROCEDURE — 97535 SELF CARE MNGMENT TRAINING: CPT | Mod: GO | Performed by: OCCUPATIONAL THERAPIST

## 2023-01-01 PROCEDURE — 10180 I&D COMPLEX PO WOUND INFCTJ: CPT | Mod: 78 | Performed by: ORTHOPAEDIC SURGERY

## 2023-01-01 PROCEDURE — 87070 CULTURE OTHR SPECIMN AEROBIC: CPT | Performed by: STUDENT IN AN ORGANIZED HEALTH CARE EDUCATION/TRAINING PROGRAM

## 2023-01-01 PROCEDURE — 0SRB049 REPLACEMENT OF LEFT HIP JOINT WITH CERAMIC ON POLYETHYLENE SYNTHETIC SUBSTITUTE, CEMENTED, OPEN APPROACH: ICD-10-PCS | Performed by: ORTHOPAEDIC SURGERY

## 2023-01-01 PROCEDURE — 89051 BODY FLUID CELL COUNT: CPT | Performed by: ORTHOPAEDIC SURGERY

## 2023-01-01 PROCEDURE — 99223 1ST HOSP IP/OBS HIGH 75: CPT | Mod: FS | Performed by: STUDENT IN AN ORGANIZED HEALTH CARE EDUCATION/TRAINING PROGRAM

## 2023-01-01 PROCEDURE — 95711 VEEG 2-12 HR UNMONITORED: CPT

## 2023-01-01 PROCEDURE — 84100 ASSAY OF PHOSPHORUS: CPT | Performed by: STUDENT IN AN ORGANIZED HEALTH CARE EDUCATION/TRAINING PROGRAM

## 2023-01-01 PROCEDURE — 36558 INSERT TUNNELED CV CATH: CPT | Performed by: RADIOLOGY

## 2023-01-01 PROCEDURE — 36589 REMOVAL TUNNELED CV CATH: CPT | Performed by: RADIOLOGY

## 2023-01-01 DEVICE — IMPLANTABLE DEVICE
Type: IMPLANTABLE DEVICE | Site: HIP | Status: FUNCTIONAL
Brand: G7® LONGEVITY®

## 2023-01-01 DEVICE — IMPLANTABLE DEVICE: Type: IMPLANTABLE DEVICE | Site: HIP | Status: FUNCTIONAL

## 2023-01-01 DEVICE — IMPLANTABLE DEVICE
Type: IMPLANTABLE DEVICE | Site: HIP | Status: FUNCTIONAL
Brand: BIOLOX® DELTA OPTION

## 2023-01-01 DEVICE — IMPLANTABLE DEVICE
Type: IMPLANTABLE DEVICE | Site: HIP | Status: FUNCTIONAL
Brand: BIOLOX® OPTION

## 2023-01-01 RX ORDER — CEFEPIME HYDROCHLORIDE 2 G/1
2 INJECTION, POWDER, FOR SOLUTION INTRAVENOUS EVERY 8 HOURS
Status: DISCONTINUED | OUTPATIENT
Start: 2023-01-01 | End: 2023-01-01

## 2023-01-01 RX ORDER — LIDOCAINE HYDROCHLORIDE 20 MG/ML
INJECTION, SOLUTION INFILTRATION; PERINEURAL PRN
Status: DISCONTINUED | OUTPATIENT
Start: 2023-01-01 | End: 2023-01-01

## 2023-01-01 RX ORDER — ONDANSETRON 2 MG/ML
4 INJECTION INTRAMUSCULAR; INTRAVENOUS EVERY 30 MIN PRN
Status: DISCONTINUED | OUTPATIENT
Start: 2023-01-01 | End: 2023-01-01 | Stop reason: HOSPADM

## 2023-01-01 RX ORDER — TRANEXAMIC ACID 650 MG/1
1950 TABLET ORAL ONCE
Status: CANCELLED | OUTPATIENT
Start: 2023-01-01 | End: 2023-01-01

## 2023-01-01 RX ORDER — NALOXONE HYDROCHLORIDE 0.4 MG/ML
0.4 INJECTION, SOLUTION INTRAMUSCULAR; INTRAVENOUS; SUBCUTANEOUS
Status: DISCONTINUED | OUTPATIENT
Start: 2023-01-01 | End: 2023-01-01

## 2023-01-01 RX ORDER — PROPOFOL 10 MG/ML
INJECTION, EMULSION INTRAVENOUS PRN
Status: DISCONTINUED | OUTPATIENT
Start: 2023-01-01 | End: 2023-01-01

## 2023-01-01 RX ORDER — CARVEDILOL 6.25 MG/1
6.25 TABLET ORAL 2 TIMES DAILY WITH MEALS
Qty: 60 TABLET | Refills: 0 | Status: SHIPPED | OUTPATIENT
Start: 2023-01-01 | End: 2023-01-01

## 2023-01-01 RX ORDER — SODIUM CHLORIDE, SODIUM LACTATE, POTASSIUM CHLORIDE, CALCIUM CHLORIDE 600; 310; 30; 20 MG/100ML; MG/100ML; MG/100ML; MG/100ML
INJECTION, SOLUTION INTRAVENOUS CONTINUOUS PRN
Status: DISCONTINUED | OUTPATIENT
Start: 2023-01-01 | End: 2023-01-01

## 2023-01-01 RX ORDER — ACETAMINOPHEN 325 MG/1
650 TABLET ORAL EVERY 4 HOURS PRN
Status: DISCONTINUED | OUTPATIENT
Start: 2023-01-01 | End: 2023-01-01

## 2023-01-01 RX ORDER — ACETAMINOPHEN 500 MG
1000 TABLET ORAL EVERY 8 HOURS PRN
Status: CANCELLED | OUTPATIENT
Start: 2023-01-01

## 2023-01-01 RX ORDER — BISACODYL 10 MG
10 SUPPOSITORY, RECTAL RECTAL ONCE
Status: DISCONTINUED | OUTPATIENT
Start: 2023-01-01 | End: 2023-01-01

## 2023-01-01 RX ORDER — MULTIPLE VITAMINS W/ MINERALS TAB 9MG-400MCG
1 TAB ORAL DAILY
Qty: 30 TABLET | Refills: 0 | Status: SHIPPED | OUTPATIENT
Start: 2023-01-01 | End: 2023-01-01

## 2023-01-01 RX ORDER — AMOXICILLIN 250 MG
1 CAPSULE ORAL 2 TIMES DAILY
Status: DISCONTINUED | OUTPATIENT
Start: 2023-01-01 | End: 2023-01-01

## 2023-01-01 RX ORDER — SODIUM CHLORIDE 9 MG/ML
INJECTION, SOLUTION INTRAVENOUS
Status: COMPLETED
Start: 2023-01-01 | End: 2023-01-01

## 2023-01-01 RX ORDER — ACETAMINOPHEN 325 MG/1
975 TABLET ORAL 3 TIMES DAILY
Status: DISCONTINUED | OUTPATIENT
Start: 2023-01-01 | End: 2023-01-01 | Stop reason: HOSPADM

## 2023-01-01 RX ORDER — CEFAZOLIN SODIUM/WATER 2 G/20 ML
2 SYRINGE (ML) INTRAVENOUS
Status: COMPLETED | OUTPATIENT
Start: 2023-01-01 | End: 2023-01-01

## 2023-01-01 RX ORDER — BISACODYL 10 MG
10 SUPPOSITORY, RECTAL RECTAL DAILY PRN
Status: DISCONTINUED | OUTPATIENT
Start: 2023-01-01 | End: 2023-01-01 | Stop reason: HOSPADM

## 2023-01-01 RX ORDER — MULTIPLE VITAMINS W/ MINERALS TAB 9MG-400MCG
1 TAB ORAL DAILY
Status: DISCONTINUED | OUTPATIENT
Start: 2023-01-01 | End: 2023-01-01 | Stop reason: HOSPADM

## 2023-01-01 RX ORDER — TRAMADOL HYDROCHLORIDE 50 MG/1
50 TABLET ORAL EVERY 6 HOURS PRN
Status: ON HOLD | COMMUNITY
End: 2023-01-01

## 2023-01-01 RX ORDER — OXYCODONE HYDROCHLORIDE 5 MG/1
5 TABLET ORAL EVERY 4 HOURS PRN
Status: DISCONTINUED | OUTPATIENT
Start: 2023-01-01 | End: 2023-01-01

## 2023-01-01 RX ORDER — MAGNESIUM SULFATE HEPTAHYDRATE 40 MG/ML
2 INJECTION, SOLUTION INTRAVENOUS ONCE
Status: COMPLETED | OUTPATIENT
Start: 2023-01-01 | End: 2023-01-01

## 2023-01-01 RX ORDER — POLYETHYLENE GLYCOL 3350 17 G/17G
17 POWDER, FOR SOLUTION ORAL DAILY PRN
Status: DISCONTINUED | OUTPATIENT
Start: 2023-01-01 | End: 2023-01-01 | Stop reason: HOSPADM

## 2023-01-01 RX ORDER — LIDOCAINE 4 G/G
1 PATCH TOPICAL EVERY 24 HOURS
Status: DISCONTINUED | OUTPATIENT
Start: 2023-01-01 | End: 2023-01-01 | Stop reason: HOSPADM

## 2023-01-01 RX ORDER — DOXYCYCLINE 100 MG/1
100 CAPSULE ORAL EVERY 12 HOURS
Qty: 56 CAPSULE | Refills: 0 | Status: ON HOLD | DISCHARGE
Start: 2023-01-01 | End: 2023-01-01

## 2023-01-01 RX ORDER — NALOXONE HYDROCHLORIDE 0.4 MG/ML
0.2 INJECTION, SOLUTION INTRAMUSCULAR; INTRAVENOUS; SUBCUTANEOUS
Status: DISCONTINUED | OUTPATIENT
Start: 2023-01-01 | End: 2023-01-01

## 2023-01-01 RX ORDER — METHOCARBAMOL 500 MG/1
500 TABLET, FILM COATED ORAL EVERY 6 HOURS PRN
Status: DISCONTINUED | OUTPATIENT
Start: 2023-01-01 | End: 2023-01-01 | Stop reason: HOSPADM

## 2023-01-01 RX ORDER — LIDOCAINE 40 MG/G
CREAM TOPICAL
Status: DISCONTINUED | OUTPATIENT
Start: 2023-01-01 | End: 2023-01-01 | Stop reason: HOSPADM

## 2023-01-01 RX ORDER — FENTANYL CITRATE 50 UG/ML
INJECTION, SOLUTION INTRAMUSCULAR; INTRAVENOUS PRN
Status: DISCONTINUED | OUTPATIENT
Start: 2023-01-01 | End: 2023-01-01

## 2023-01-01 RX ORDER — FENTANYL CITRATE 50 UG/ML
25 INJECTION, SOLUTION INTRAMUSCULAR; INTRAVENOUS EVERY 5 MIN PRN
Status: DISCONTINUED | OUTPATIENT
Start: 2023-01-01 | End: 2023-01-01 | Stop reason: HOSPADM

## 2023-01-01 RX ORDER — HYDROMORPHONE HYDROCHLORIDE 1 MG/ML
0.3 INJECTION, SOLUTION INTRAMUSCULAR; INTRAVENOUS; SUBCUTANEOUS EVERY 6 HOURS PRN
Status: DISCONTINUED | OUTPATIENT
Start: 2023-01-01 | End: 2023-01-01 | Stop reason: HOSPADM

## 2023-01-01 RX ORDER — ACETAMINOPHEN 325 MG/1
975 TABLET ORAL ONCE
Status: DISCONTINUED | OUTPATIENT
Start: 2023-01-01 | End: 2023-01-01 | Stop reason: HOSPADM

## 2023-01-01 RX ORDER — VITAMIN B COMPLEX
50 TABLET ORAL DAILY
Status: DISCONTINUED | OUTPATIENT
Start: 2023-01-01 | End: 2023-01-01 | Stop reason: HOSPADM

## 2023-01-01 RX ORDER — AMOXICILLIN 250 MG
2 CAPSULE ORAL 2 TIMES DAILY
Status: DISCONTINUED | OUTPATIENT
Start: 2023-01-01 | End: 2023-01-01

## 2023-01-01 RX ORDER — ONDANSETRON 4 MG/1
4 TABLET, ORALLY DISINTEGRATING ORAL EVERY 30 MIN PRN
Status: DISCONTINUED | OUTPATIENT
Start: 2023-01-01 | End: 2023-01-01 | Stop reason: HOSPADM

## 2023-01-01 RX ORDER — ONDANSETRON 2 MG/ML
4 INJECTION INTRAMUSCULAR; INTRAVENOUS EVERY 6 HOURS PRN
Status: DISCONTINUED | OUTPATIENT
Start: 2023-01-01 | End: 2023-01-01 | Stop reason: HOSPADM

## 2023-01-01 RX ORDER — ONDANSETRON 2 MG/ML
INJECTION INTRAMUSCULAR; INTRAVENOUS PRN
Status: DISCONTINUED | OUTPATIENT
Start: 2023-01-01 | End: 2023-01-01

## 2023-01-01 RX ORDER — HYDROMORPHONE HYDROCHLORIDE 1 MG/ML
0.2 INJECTION, SOLUTION INTRAMUSCULAR; INTRAVENOUS; SUBCUTANEOUS
Status: DISCONTINUED | OUTPATIENT
Start: 2023-01-01 | End: 2023-01-01 | Stop reason: HOSPADM

## 2023-01-01 RX ORDER — CEFTAZIDIME 2 G/1
2 INJECTION, POWDER, FOR SOLUTION INTRAVENOUS EVERY 24 HOURS
Status: DISCONTINUED | OUTPATIENT
Start: 2023-01-01 | End: 2023-01-01

## 2023-01-01 RX ORDER — FERROUS SULFATE 325(65) MG
325 TABLET ORAL EVERY OTHER DAY
Status: ON HOLD | DISCHARGE
Start: 2023-01-01 | End: 2023-01-01

## 2023-01-01 RX ORDER — MAGNESIUM SULFATE 1 G/100ML
1 INJECTION INTRAVENOUS ONCE
Status: COMPLETED | OUTPATIENT
Start: 2023-01-01 | End: 2023-01-01

## 2023-01-01 RX ORDER — DOCUSATE SODIUM 100 MG/1
100 CAPSULE, LIQUID FILLED ORAL 2 TIMES DAILY PRN
Status: DISCONTINUED | OUTPATIENT
Start: 2023-01-01 | End: 2023-01-01 | Stop reason: HOSPADM

## 2023-01-01 RX ORDER — NALOXONE HYDROCHLORIDE 0.4 MG/ML
0.4 INJECTION, SOLUTION INTRAMUSCULAR; INTRAVENOUS; SUBCUTANEOUS
Status: DISCONTINUED | OUTPATIENT
Start: 2023-01-01 | End: 2023-01-01 | Stop reason: HOSPADM

## 2023-01-01 RX ORDER — DEXAMETHASONE SODIUM PHOSPHATE 4 MG/ML
INJECTION, SOLUTION INTRA-ARTICULAR; INTRALESIONAL; INTRAMUSCULAR; INTRAVENOUS; SOFT TISSUE PRN
Status: DISCONTINUED | OUTPATIENT
Start: 2023-01-01 | End: 2023-01-01

## 2023-01-01 RX ORDER — POLYETHYLENE GLYCOL 3350 17 G/17G
17 POWDER, FOR SOLUTION ORAL DAILY
Status: DISCONTINUED | OUTPATIENT
Start: 2023-01-01 | End: 2023-01-01 | Stop reason: HOSPADM

## 2023-01-01 RX ORDER — LANOLIN ALCOHOL/MO/W.PET/CERES
3 CREAM (GRAM) TOPICAL AT BEDTIME
DISCHARGE
Start: 2023-01-01

## 2023-01-01 RX ORDER — CEFEPIME HYDROCHLORIDE 2 G/1
2 INJECTION, POWDER, FOR SOLUTION INTRAVENOUS EVERY 12 HOURS
Status: DISCONTINUED | OUTPATIENT
Start: 2023-01-01 | End: 2023-01-01

## 2023-01-01 RX ORDER — NAPROXEN 250 MG/1
500 TABLET ORAL 2 TIMES DAILY WITH MEALS
Status: COMPLETED | OUTPATIENT
Start: 2023-01-01 | End: 2023-01-01

## 2023-01-01 RX ORDER — FOLIC ACID 1 MG/1
1 TABLET ORAL DAILY
Qty: 30 TABLET | Refills: 0 | DISCHARGE
Start: 2023-01-01

## 2023-01-01 RX ORDER — GLIPIZIDE 10 MG/1
10 TABLET, FILM COATED, EXTENDED RELEASE ORAL DAILY
Status: ON HOLD | COMMUNITY
End: 2023-01-01

## 2023-01-01 RX ORDER — CALCIUM CARBONATE 500 MG/1
500 TABLET, CHEWABLE ORAL 3 TIMES DAILY PRN
Status: DISCONTINUED | OUTPATIENT
Start: 2023-01-01 | End: 2023-01-01 | Stop reason: HOSPADM

## 2023-01-01 RX ORDER — AMOXICILLIN 250 MG
1 CAPSULE ORAL 2 TIMES DAILY PRN
Qty: 30 TABLET | Refills: 0 | DISCHARGE
Start: 2023-01-01

## 2023-01-01 RX ORDER — HYDROMORPHONE HYDROCHLORIDE 1 MG/ML
0.4 INJECTION, SOLUTION INTRAMUSCULAR; INTRAVENOUS; SUBCUTANEOUS EVERY 5 MIN PRN
Status: DISCONTINUED | OUTPATIENT
Start: 2023-01-01 | End: 2023-01-01 | Stop reason: HOSPADM

## 2023-01-01 RX ORDER — METHOCARBAMOL 500 MG/1
500 TABLET, FILM COATED ORAL EVERY 6 HOURS PRN
Status: ON HOLD | DISCHARGE
Start: 2023-01-01 | End: 2023-01-01

## 2023-01-01 RX ORDER — NALOXONE HYDROCHLORIDE 0.4 MG/ML
0.2 INJECTION, SOLUTION INTRAMUSCULAR; INTRAVENOUS; SUBCUTANEOUS
Status: DISCONTINUED | OUTPATIENT
Start: 2023-01-01 | End: 2023-01-01 | Stop reason: HOSPADM

## 2023-01-01 RX ORDER — OXYCODONE HYDROCHLORIDE 5 MG/1
5 TABLET ORAL EVERY 4 HOURS PRN
Status: DISCONTINUED | OUTPATIENT
Start: 2023-01-01 | End: 2023-01-01 | Stop reason: HOSPADM

## 2023-01-01 RX ORDER — OXYCODONE HYDROCHLORIDE 5 MG/1
5 TABLET ORAL EVERY 4 HOURS PRN
Qty: 26 TABLET | Refills: 0 | Status: ON HOLD | OUTPATIENT
Start: 2023-01-01 | End: 2023-01-01

## 2023-01-01 RX ORDER — LIDOCAINE 40 MG/G
CREAM TOPICAL
Status: DISCONTINUED | OUTPATIENT
Start: 2023-01-01 | End: 2023-01-01

## 2023-01-01 RX ORDER — TRAMADOL HYDROCHLORIDE 50 MG/1
25-50 TABLET ORAL EVERY 6 HOURS PRN
Qty: 12 TABLET | Refills: 0 | Status: SHIPPED | DISCHARGE
Start: 2023-01-01

## 2023-01-01 RX ORDER — NALOXONE HYDROCHLORIDE 0.4 MG/ML
0.4 INJECTION, SOLUTION INTRAMUSCULAR; INTRAVENOUS; SUBCUTANEOUS
Status: CANCELLED | OUTPATIENT
Start: 2023-01-01

## 2023-01-01 RX ORDER — CALCIUM CARBONATE/VITAMIN D3 500-10/5ML
1 LIQUID (ML) ORAL DAILY
Status: ON HOLD | COMMUNITY
End: 2023-01-01

## 2023-01-01 RX ORDER — AMLODIPINE BESYLATE 10 MG/1
10 TABLET ORAL DAILY
Status: ON HOLD | DISCHARGE
Start: 2023-01-01 | End: 2023-01-01

## 2023-01-01 RX ORDER — POLYETHYLENE GLYCOL 3350 17 G/17G
17 POWDER, FOR SOLUTION ORAL 2 TIMES DAILY PRN
Status: DISCONTINUED | OUTPATIENT
Start: 2023-01-01 | End: 2023-01-01

## 2023-01-01 RX ORDER — ATORVASTATIN CALCIUM 40 MG/1
40 TABLET, FILM COATED ORAL DAILY
Status: DISCONTINUED | OUTPATIENT
Start: 2023-01-01 | End: 2023-01-01 | Stop reason: HOSPADM

## 2023-01-01 RX ORDER — ONDANSETRON 4 MG/1
4 TABLET, ORALLY DISINTEGRATING ORAL EVERY 6 HOURS PRN
Status: DISCONTINUED | OUTPATIENT
Start: 2023-01-01 | End: 2023-01-01 | Stop reason: HOSPADM

## 2023-01-01 RX ORDER — PIOGLITAZONEHYDROCHLORIDE 45 MG/1
45 TABLET ORAL DAILY
Status: DISCONTINUED | OUTPATIENT
Start: 2023-01-01 | End: 2023-01-01 | Stop reason: HOSPADM

## 2023-01-01 RX ORDER — LANOLIN ALCOHOL/MO/W.PET/CERES
3 CREAM (GRAM) TOPICAL AT BEDTIME
Status: DISCONTINUED | OUTPATIENT
Start: 2023-01-01 | End: 2023-01-01 | Stop reason: HOSPADM

## 2023-01-01 RX ORDER — MAGNESIUM SULFATE HEPTAHYDRATE 40 MG/ML
4 INJECTION, SOLUTION INTRAVENOUS ONCE
Status: COMPLETED | OUTPATIENT
Start: 2023-01-01 | End: 2023-01-01

## 2023-01-01 RX ORDER — AMOXICILLIN 250 MG
2 CAPSULE ORAL 2 TIMES DAILY PRN
Status: DISCONTINUED | OUTPATIENT
Start: 2023-01-01 | End: 2023-01-01 | Stop reason: HOSPADM

## 2023-01-01 RX ORDER — DEXAMETHASONE SODIUM PHOSPHATE 4 MG/ML
4 INJECTION, SOLUTION INTRA-ARTICULAR; INTRALESIONAL; INTRAMUSCULAR; INTRAVENOUS; SOFT TISSUE
Status: DISCONTINUED | OUTPATIENT
Start: 2023-01-01 | End: 2023-01-01 | Stop reason: HOSPADM

## 2023-01-01 RX ORDER — CEFAZOLIN SODIUM/WATER 2 G/20 ML
2 SYRINGE (ML) INTRAVENOUS SEE ADMIN INSTRUCTIONS
Status: DISCONTINUED | OUTPATIENT
Start: 2023-01-01 | End: 2023-01-01 | Stop reason: HOSPADM

## 2023-01-01 RX ORDER — AMOXICILLIN 250 MG
2 CAPSULE ORAL 2 TIMES DAILY PRN
Status: CANCELLED | OUTPATIENT
Start: 2023-01-01

## 2023-01-01 RX ORDER — LIDOCAINE 4 G/G
1-2 PATCH TOPICAL
Status: DISCONTINUED | OUTPATIENT
Start: 2023-01-01 | End: 2023-01-01

## 2023-01-01 RX ORDER — LORAZEPAM 2 MG/ML
1 INJECTION INTRAMUSCULAR
Status: DISCONTINUED | OUTPATIENT
Start: 2023-01-01 | End: 2023-01-01

## 2023-01-01 RX ORDER — SODIUM CHLORIDE 9 MG/ML
INJECTION, SOLUTION INTRAVENOUS
Status: DISCONTINUED
Start: 2023-01-01 | End: 2023-01-01 | Stop reason: HOSPADM

## 2023-01-01 RX ORDER — DEXTROSE MONOHYDRATE 25 G/50ML
25-50 INJECTION, SOLUTION INTRAVENOUS
Status: DISCONTINUED | OUTPATIENT
Start: 2023-01-01 | End: 2023-01-01 | Stop reason: HOSPADM

## 2023-01-01 RX ORDER — LIDOCAINE HYDROCHLORIDE 10 MG/ML
4 INJECTION, SOLUTION EPIDURAL; INFILTRATION; INTRACAUDAL; PERINEURAL ONCE
Status: COMPLETED | OUTPATIENT
Start: 2023-01-01 | End: 2023-01-01

## 2023-01-01 RX ORDER — VITAMIN B COMPLEX
50 TABLET ORAL DAILY
Qty: 60 TABLET | Refills: 0 | DISCHARGE
Start: 2023-01-01

## 2023-01-01 RX ORDER — PROCHLORPERAZINE MALEATE 5 MG
5 TABLET ORAL EVERY 6 HOURS PRN
Status: DISCONTINUED | OUTPATIENT
Start: 2023-01-01 | End: 2023-01-01 | Stop reason: HOSPADM

## 2023-01-01 RX ORDER — FUROSEMIDE 40 MG
40 TABLET ORAL DAILY
Status: DISCONTINUED | OUTPATIENT
Start: 2023-01-01 | End: 2023-01-01

## 2023-01-01 RX ORDER — CEFAZOLIN SODIUM IN 0.9 % NACL 3 G/100 ML
3 INTRAVENOUS SOLUTION, PIGGYBACK (ML) INTRAVENOUS
Status: CANCELLED | OUTPATIENT
Start: 2023-01-01

## 2023-01-01 RX ORDER — DOCUSATE SODIUM 100 MG/1
100 CAPSULE, LIQUID FILLED ORAL 2 TIMES DAILY PRN
Qty: 60 CAPSULE | Refills: 0 | Status: SHIPPED | OUTPATIENT
Start: 2023-01-01 | End: 2023-01-01

## 2023-01-01 RX ORDER — AMLODIPINE BESYLATE 10 MG/1
10 TABLET ORAL DAILY
Status: DISCONTINUED | OUTPATIENT
Start: 2023-01-01 | End: 2023-01-01 | Stop reason: HOSPADM

## 2023-01-01 RX ORDER — FENTANYL CITRATE 50 UG/ML
50 INJECTION, SOLUTION INTRAMUSCULAR; INTRAVENOUS EVERY 5 MIN PRN
Status: DISCONTINUED | OUTPATIENT
Start: 2023-01-01 | End: 2023-01-01 | Stop reason: HOSPADM

## 2023-01-01 RX ORDER — DOCUSATE SODIUM 100 MG/1
100 CAPSULE, LIQUID FILLED ORAL 2 TIMES DAILY PRN
Qty: 60 CAPSULE | Refills: 0 | DISCHARGE
Start: 2023-01-01

## 2023-01-01 RX ORDER — SODIUM CHLORIDE 9 MG/ML
INJECTION, SOLUTION INTRAVENOUS CONTINUOUS
Status: DISCONTINUED | OUTPATIENT
Start: 2023-01-01 | End: 2023-01-01

## 2023-01-01 RX ORDER — CEFTRIAXONE 2 G/1
2 INJECTION, POWDER, FOR SOLUTION INTRAMUSCULAR; INTRAVENOUS EVERY 24 HOURS
Status: DISCONTINUED | OUTPATIENT
Start: 2023-01-01 | End: 2023-01-01

## 2023-01-01 RX ORDER — LANOLIN ALCOHOL/MO/W.PET/CERES
100 CREAM (GRAM) TOPICAL DAILY
Qty: 30 TABLET | Refills: 0 | DISCHARGE
Start: 2023-01-01

## 2023-01-01 RX ORDER — HYDRALAZINE HYDROCHLORIDE 20 MG/ML
2.5-5 INJECTION INTRAMUSCULAR; INTRAVENOUS EVERY 10 MIN PRN
Status: DISCONTINUED | OUTPATIENT
Start: 2023-01-01 | End: 2023-01-01 | Stop reason: HOSPADM

## 2023-01-01 RX ORDER — ONDANSETRON 4 MG/1
4 TABLET, ORALLY DISINTEGRATING ORAL EVERY 6 HOURS PRN
Status: CANCELLED | OUTPATIENT
Start: 2023-01-01

## 2023-01-01 RX ORDER — FOLIC ACID 1 MG/1
1 TABLET ORAL DAILY
Status: ON HOLD | DISCHARGE
Start: 2023-01-01 | End: 2023-01-01

## 2023-01-01 RX ORDER — TRAMADOL HYDROCHLORIDE 50 MG/1
25-50 TABLET ORAL EVERY 6 HOURS PRN
Qty: 12 TABLET | Refills: 0 | Status: SHIPPED | OUTPATIENT
Start: 2023-01-01 | End: 2023-01-01

## 2023-01-01 RX ORDER — LIRAGLUTIDE 6 MG/ML
1.8 INJECTION SUBCUTANEOUS DAILY
Status: CANCELLED | OUTPATIENT
Start: 2023-01-01

## 2023-01-01 RX ORDER — FERROUS SULFATE 325(65) MG
325 TABLET ORAL EVERY OTHER DAY
Qty: 15 TABLET | Refills: 0 | Status: SHIPPED | OUTPATIENT
Start: 2023-01-01 | End: 2023-01-01

## 2023-01-01 RX ORDER — CARVEDILOL 6.25 MG/1
6.25 TABLET ORAL 2 TIMES DAILY WITH MEALS
Status: DISCONTINUED | OUTPATIENT
Start: 2023-01-01 | End: 2023-01-01 | Stop reason: HOSPADM

## 2023-01-01 RX ORDER — HYDROMORPHONE HYDROCHLORIDE 1 MG/ML
0.2 INJECTION, SOLUTION INTRAMUSCULAR; INTRAVENOUS; SUBCUTANEOUS EVERY 5 MIN PRN
Status: DISCONTINUED | OUTPATIENT
Start: 2023-01-01 | End: 2023-01-01 | Stop reason: HOSPADM

## 2023-01-01 RX ORDER — ACETAMINOPHEN 325 MG/1
975 TABLET ORAL EVERY 8 HOURS
Status: COMPLETED | OUTPATIENT
Start: 2023-01-01 | End: 2023-01-01

## 2023-01-01 RX ORDER — FENTANYL CITRATE 50 UG/ML
25 INJECTION, SOLUTION INTRAMUSCULAR; INTRAVENOUS
Status: DISCONTINUED | OUTPATIENT
Start: 2023-01-01 | End: 2023-01-01

## 2023-01-01 RX ORDER — OXYCODONE HYDROCHLORIDE 5 MG/1
5 TABLET ORAL EVERY 6 HOURS PRN
Status: DISCONTINUED | OUTPATIENT
Start: 2023-01-01 | End: 2023-01-01

## 2023-01-01 RX ORDER — PROCHLORPERAZINE MALEATE 5 MG
5 TABLET ORAL EVERY 6 HOURS PRN
Status: ACTIVE | OUTPATIENT
Start: 2023-01-01 | End: 2023-01-01

## 2023-01-01 RX ORDER — DEXTROSE MONOHYDRATE 50 MG/ML
INJECTION, SOLUTION INTRAVENOUS CONTINUOUS
Status: DISPENSED | OUTPATIENT
Start: 2023-01-01 | End: 2023-01-01

## 2023-01-01 RX ORDER — HEPARIN SODIUM,PORCINE 10 UNIT/ML
5-20 VIAL (ML) INTRAVENOUS EVERY 24 HOURS
Status: DISCONTINUED | OUTPATIENT
Start: 2023-01-01 | End: 2023-01-01

## 2023-01-01 RX ORDER — AMOXICILLIN 250 MG
1 CAPSULE ORAL 2 TIMES DAILY
Status: DISCONTINUED | OUTPATIENT
Start: 2023-01-01 | End: 2023-01-01 | Stop reason: HOSPADM

## 2023-01-01 RX ORDER — CIPROFLOXACIN 250 MG/1
500 TABLET, FILM COATED ORAL
Status: DISCONTINUED | OUTPATIENT
Start: 2023-01-01 | End: 2023-01-01

## 2023-01-01 RX ORDER — SODIUM CHLORIDE, SODIUM LACTATE, POTASSIUM CHLORIDE, CALCIUM CHLORIDE 600; 310; 30; 20 MG/100ML; MG/100ML; MG/100ML; MG/100ML
INJECTION, SOLUTION INTRAVENOUS CONTINUOUS
Status: DISCONTINUED | OUTPATIENT
Start: 2023-01-01 | End: 2023-01-01

## 2023-01-01 RX ORDER — ACETAMINOPHEN 500 MG
1000 TABLET ORAL EVERY 8 HOURS PRN
Status: DISCONTINUED | OUTPATIENT
Start: 2023-01-01 | End: 2023-01-01 | Stop reason: HOSPADM

## 2023-01-01 RX ORDER — TAMSULOSIN HYDROCHLORIDE 0.4 MG/1
0.4 CAPSULE ORAL DAILY
Qty: 30 CAPSULE | Refills: 0 | DISCHARGE
Start: 2023-01-01

## 2023-01-01 RX ORDER — METHOCARBAMOL 500 MG/1
500 TABLET, FILM COATED ORAL 4 TIMES DAILY
Status: DISCONTINUED | OUTPATIENT
Start: 2023-01-01 | End: 2023-01-01

## 2023-01-01 RX ORDER — NITROGLYCERIN 0.4 MG/1
TABLET SUBLINGUAL
Qty: 30 TABLET | Refills: 0 | Status: SHIPPED | OUTPATIENT
Start: 2023-01-01 | End: 2023-01-01

## 2023-01-01 RX ORDER — HEPARIN SODIUM 1000 [USP'U]/ML
6 INJECTION, SOLUTION INTRAVENOUS; SUBCUTANEOUS ONCE
Status: COMPLETED | OUTPATIENT
Start: 2023-01-01 | End: 2023-01-01

## 2023-01-01 RX ORDER — CEFAZOLIN SODIUM 2 G/50ML
2 SOLUTION INTRAVENOUS
Status: CANCELLED | OUTPATIENT
Start: 2023-01-01

## 2023-01-01 RX ORDER — POLYETHYLENE GLYCOL 3350 17 G/17G
17 POWDER, FOR SOLUTION ORAL DAILY PRN
Status: CANCELLED | OUTPATIENT
Start: 2023-01-01

## 2023-01-01 RX ORDER — LANOLIN ALCOHOL/MO/W.PET/CERES
100 CREAM (GRAM) TOPICAL DAILY
Qty: 30 TABLET | Refills: 0 | Status: SHIPPED | OUTPATIENT
Start: 2023-01-01 | End: 2023-01-01

## 2023-01-01 RX ORDER — METFORMIN HCL 500 MG
2000 TABLET, EXTENDED RELEASE 24 HR ORAL EVERY MORNING
Status: CANCELLED | OUTPATIENT
Start: 2023-01-01

## 2023-01-01 RX ORDER — CALCIUM CARBONATE 500 MG/1
500 TABLET, CHEWABLE ORAL 3 TIMES DAILY PRN
Status: CANCELLED | OUTPATIENT
Start: 2023-01-01

## 2023-01-01 RX ORDER — FLUMAZENIL 0.1 MG/ML
0.2 INJECTION, SOLUTION INTRAVENOUS
Status: DISCONTINUED | OUTPATIENT
Start: 2023-01-01 | End: 2023-01-01

## 2023-01-01 RX ORDER — HYDROMORPHONE HYDROCHLORIDE 1 MG/ML
0.3 INJECTION, SOLUTION INTRAMUSCULAR; INTRAVENOUS; SUBCUTANEOUS EVERY 6 HOURS PRN
Status: DISCONTINUED | OUTPATIENT
Start: 2023-01-01 | End: 2023-01-01

## 2023-01-01 RX ORDER — PIOGLITAZONEHYDROCHLORIDE 45 MG/1
45 TABLET ORAL DAILY
Status: ON HOLD | COMMUNITY
End: 2023-01-01

## 2023-01-01 RX ORDER — FOLIC ACID 1 MG/1
1 TABLET ORAL DAILY
Qty: 30 TABLET | Refills: 0 | Status: SHIPPED | OUTPATIENT
Start: 2023-01-01 | End: 2023-01-01

## 2023-01-01 RX ORDER — SODIUM CHLORIDE, SODIUM LACTATE, POTASSIUM CHLORIDE, CALCIUM CHLORIDE 600; 310; 30; 20 MG/100ML; MG/100ML; MG/100ML; MG/100ML
INJECTION, SOLUTION INTRAVENOUS CONTINUOUS
Status: DISCONTINUED | OUTPATIENT
Start: 2023-01-01 | End: 2023-01-01 | Stop reason: HOSPADM

## 2023-01-01 RX ORDER — NICOTINE POLACRILEX 4 MG
15-30 LOZENGE BUCCAL
Status: DISCONTINUED | OUTPATIENT
Start: 2023-01-01 | End: 2023-01-01 | Stop reason: HOSPADM

## 2023-01-01 RX ORDER — METHOCARBAMOL 500 MG/1
500 TABLET, FILM COATED ORAL EVERY 6 HOURS PRN
Status: DISCONTINUED | OUTPATIENT
Start: 2023-01-01 | End: 2023-01-01

## 2023-01-01 RX ORDER — ACETAMINOPHEN 325 MG/1
975 TABLET ORAL 3 TIMES DAILY
Qty: 300 TABLET | Refills: 0 | DISCHARGE
Start: 2023-01-01

## 2023-01-01 RX ORDER — HALOPERIDOL 5 MG/ML
2 INJECTION INTRAMUSCULAR EVERY 6 HOURS PRN
Status: DISCONTINUED | OUTPATIENT
Start: 2023-01-01 | End: 2023-01-01

## 2023-01-01 RX ORDER — SODIUM CHLORIDE, SODIUM LACTATE, POTASSIUM CHLORIDE, CALCIUM CHLORIDE 600; 310; 30; 20 MG/100ML; MG/100ML; MG/100ML; MG/100ML
INJECTION, SOLUTION INTRAVENOUS
Status: COMPLETED
Start: 2023-01-01 | End: 2023-01-01

## 2023-01-01 RX ORDER — LACTOBACILLUS RHAMNOSUS GG 10B CELL
1 CAPSULE ORAL 2 TIMES DAILY
Status: DISCONTINUED | OUTPATIENT
Start: 2023-01-01 | End: 2023-01-01 | Stop reason: HOSPADM

## 2023-01-01 RX ORDER — SODIUM CHLORIDE, SODIUM LACTATE, POTASSIUM CHLORIDE, CALCIUM CHLORIDE 600; 310; 30; 20 MG/100ML; MG/100ML; MG/100ML; MG/100ML
INJECTION, SOLUTION INTRAVENOUS
Status: DISCONTINUED
Start: 2023-01-01 | End: 2023-01-01 | Stop reason: HOSPADM

## 2023-01-01 RX ORDER — FENTANYL CITRATE 50 UG/ML
25-50 INJECTION, SOLUTION INTRAMUSCULAR; INTRAVENOUS EVERY 5 MIN PRN
Status: DISCONTINUED | OUTPATIENT
Start: 2023-01-01 | End: 2023-01-01

## 2023-01-01 RX ORDER — HALOPERIDOL 5 MG/ML
1 INJECTION INTRAMUSCULAR
Status: DISCONTINUED | OUTPATIENT
Start: 2023-01-01 | End: 2023-01-01 | Stop reason: HOSPADM

## 2023-01-01 RX ORDER — FOLIC ACID 1 MG/1
1 TABLET ORAL DAILY
Status: DISCONTINUED | OUTPATIENT
Start: 2023-01-01 | End: 2023-01-01 | Stop reason: HOSPADM

## 2023-01-01 RX ORDER — TAMSULOSIN HYDROCHLORIDE 0.4 MG/1
0.4 CAPSULE ORAL DAILY
Status: DISCONTINUED | OUTPATIENT
Start: 2023-01-01 | End: 2023-01-01 | Stop reason: HOSPADM

## 2023-01-01 RX ORDER — HEPARIN SODIUM,PORCINE 10 UNIT/ML
5-20 VIAL (ML) INTRAVENOUS
Status: DISCONTINUED | OUTPATIENT
Start: 2023-01-01 | End: 2023-01-01 | Stop reason: HOSPADM

## 2023-01-01 RX ORDER — AMLODIPINE BESYLATE 10 MG/1
10 TABLET ORAL DAILY
Qty: 30 TABLET | Refills: 0 | Status: SHIPPED | OUTPATIENT
Start: 2023-01-01 | End: 2023-01-01

## 2023-01-01 RX ORDER — MAGNESIUM OXIDE 400 MG/1
400 TABLET ORAL 3 TIMES DAILY
Status: DISCONTINUED | OUTPATIENT
Start: 2023-01-01 | End: 2023-01-01

## 2023-01-01 RX ORDER — NICOTINE POLACRILEX 4 MG
15-30 LOZENGE BUCCAL
Status: CANCELLED | OUTPATIENT
Start: 2023-01-01

## 2023-01-01 RX ORDER — ATORVASTATIN CALCIUM 40 MG/1
40 TABLET, FILM COATED ORAL DAILY
Qty: 30 TABLET | Refills: 0 | DISCHARGE
Start: 2023-01-01

## 2023-01-01 RX ORDER — METHOCARBAMOL 500 MG/1
500 TABLET, FILM COATED ORAL EVERY 6 HOURS PRN
Qty: 30 TABLET | Refills: 0 | Status: SHIPPED | OUTPATIENT
Start: 2023-01-01 | End: 2023-01-01

## 2023-01-01 RX ORDER — MAGNESIUM SULFATE HEPTAHYDRATE 40 MG/ML
4 INJECTION, SOLUTION INTRAVENOUS ONCE
Status: DISCONTINUED | OUTPATIENT
Start: 2023-01-01 | End: 2023-01-01

## 2023-01-01 RX ORDER — CIPROFLOXACIN 500 MG/1
500 TABLET, FILM COATED ORAL EVERY 12 HOURS
Status: DISCONTINUED | OUTPATIENT
Start: 2023-01-01 | End: 2023-01-01

## 2023-01-01 RX ORDER — VANCOMYCIN HYDROCHLORIDE 1 G/200ML
1000 INJECTION, SOLUTION INTRAVENOUS ONCE
Status: COMPLETED | OUTPATIENT
Start: 2023-01-01 | End: 2023-01-01

## 2023-01-01 RX ORDER — LIDOCAINE 4 G/G
1 PATCH TOPICAL
Status: DISCONTINUED | OUTPATIENT
Start: 2023-01-01 | End: 2023-01-01 | Stop reason: HOSPADM

## 2023-01-01 RX ORDER — VANCOMYCIN HYDROCHLORIDE 500 MG/10ML
500 INJECTION, POWDER, LYOPHILIZED, FOR SOLUTION INTRAVENOUS EVERY 24 HOURS
Status: DISCONTINUED | OUTPATIENT
Start: 2023-01-01 | End: 2023-01-01

## 2023-01-01 RX ORDER — DOXYCYCLINE 100 MG/1
100 CAPSULE ORAL EVERY 12 HOURS SCHEDULED
Status: DISCONTINUED | OUTPATIENT
Start: 2023-01-01 | End: 2023-01-01 | Stop reason: HOSPADM

## 2023-01-01 RX ORDER — LABETALOL HYDROCHLORIDE 5 MG/ML
10 INJECTION, SOLUTION INTRAVENOUS
Status: DISCONTINUED | OUTPATIENT
Start: 2023-01-01 | End: 2023-01-01 | Stop reason: HOSPADM

## 2023-01-01 RX ORDER — DEXTROSE MONOHYDRATE 25 G/50ML
25-50 INJECTION, SOLUTION INTRAVENOUS
Status: CANCELLED | OUTPATIENT
Start: 2023-01-01

## 2023-01-01 RX ORDER — LIDOCAINE 4 G/G
1 PATCH TOPICAL EVERY 24 HOURS
Status: ON HOLD | DISCHARGE
Start: 2023-01-01 | End: 2023-01-01

## 2023-01-01 RX ORDER — MAGNESIUM OXIDE 400 MG/1
400 TABLET ORAL 2 TIMES DAILY
Status: COMPLETED | OUTPATIENT
Start: 2023-01-01 | End: 2023-01-01

## 2023-01-01 RX ORDER — CIPROFLOXACIN 250 MG/1
500 TABLET, FILM COATED ORAL EVERY 12 HOURS SCHEDULED
Status: DISCONTINUED | OUTPATIENT
Start: 2023-01-01 | End: 2023-01-01

## 2023-01-01 RX ORDER — MAGNESIUM OXIDE 400 MG/1
400 TABLET ORAL 3 TIMES DAILY
Status: COMPLETED | OUTPATIENT
Start: 2023-01-01 | End: 2023-01-01

## 2023-01-01 RX ORDER — POLYETHYLENE GLYCOL 3350 17 G/17G
17 POWDER, FOR SOLUTION ORAL DAILY
Status: DISCONTINUED | OUTPATIENT
Start: 2023-01-01 | End: 2023-01-01

## 2023-01-01 RX ORDER — DOCUSATE SODIUM 100 MG/1
100 CAPSULE, LIQUID FILLED ORAL 2 TIMES DAILY
Status: DISCONTINUED | OUTPATIENT
Start: 2023-01-01 | End: 2023-01-01 | Stop reason: HOSPADM

## 2023-01-01 RX ORDER — ACETAMINOPHEN 650 MG/1
650 SUPPOSITORY RECTAL EVERY 4 HOURS PRN
Status: DISCONTINUED | OUTPATIENT
Start: 2023-01-01 | End: 2023-01-01 | Stop reason: HOSPADM

## 2023-01-01 RX ORDER — SODIUM CHLORIDE 9 MG/ML
INJECTION, SOLUTION INTRAVENOUS CONTINUOUS PRN
Status: DISCONTINUED | OUTPATIENT
Start: 2023-01-01 | End: 2023-01-01

## 2023-01-01 RX ORDER — ATORVASTATIN CALCIUM 40 MG/1
40 TABLET, FILM COATED ORAL DAILY
Qty: 30 TABLET | Refills: 0 | Status: SHIPPED | OUTPATIENT
Start: 2023-01-01 | End: 2023-01-01

## 2023-01-01 RX ORDER — PIOGLITAZONEHYDROCHLORIDE 45 MG/1
45 TABLET ORAL DAILY
Status: CANCELLED | OUTPATIENT
Start: 2023-01-01

## 2023-01-01 RX ORDER — METFORMIN HCL 500 MG
2000 TABLET, EXTENDED RELEASE 24 HR ORAL EVERY MORNING
Status: DISCONTINUED | OUTPATIENT
Start: 2023-01-01 | End: 2023-01-01 | Stop reason: HOSPADM

## 2023-01-01 RX ORDER — SODIUM CHLORIDE 450 MG/100ML
INJECTION, SOLUTION INTRAVENOUS
Status: COMPLETED
Start: 2023-01-01 | End: 2023-01-01

## 2023-01-01 RX ORDER — ACETAMINOPHEN 325 MG/1
650 TABLET ORAL EVERY 4 HOURS PRN
Status: DISCONTINUED | OUTPATIENT
Start: 2023-01-01 | End: 2023-01-01 | Stop reason: HOSPADM

## 2023-01-01 RX ORDER — LIDOCAINE 4 G/G
1 PATCH TOPICAL EVERY 24 HOURS
Qty: 30 PATCH | Refills: 0 | DISCHARGE
Start: 2023-01-01

## 2023-01-01 RX ORDER — OXYCODONE HYDROCHLORIDE 10 MG/1
10 TABLET ORAL EVERY 4 HOURS PRN
Status: DISCONTINUED | OUTPATIENT
Start: 2023-01-01 | End: 2023-01-01 | Stop reason: HOSPADM

## 2023-01-01 RX ORDER — VITAMIN B COMPLEX
50 TABLET ORAL DAILY
Qty: 60 TABLET | Refills: 0 | Status: SHIPPED | OUTPATIENT
Start: 2023-01-01 | End: 2023-01-01

## 2023-01-01 RX ORDER — DEXTROSE, SODIUM CHLORIDE, SODIUM LACTATE, POTASSIUM CHLORIDE, AND CALCIUM CHLORIDE 5; .6; .31; .03; .02 G/100ML; G/100ML; G/100ML; G/100ML; G/100ML
INJECTION, SOLUTION INTRAVENOUS CONTINUOUS
Status: DISPENSED | OUTPATIENT
Start: 2023-01-01 | End: 2023-01-01

## 2023-01-01 RX ORDER — POTASSIUM CHLORIDE 750 MG/1
40 TABLET, EXTENDED RELEASE ORAL ONCE
Status: COMPLETED | OUTPATIENT
Start: 2023-01-01 | End: 2023-01-01

## 2023-01-01 RX ORDER — FUROSEMIDE 20 MG
20 TABLET ORAL DAILY
Status: ON HOLD | COMMUNITY
End: 2023-01-01

## 2023-01-01 RX ORDER — METFORMIN HCL 500 MG
2000 TABLET, EXTENDED RELEASE 24 HR ORAL DAILY
Status: ON HOLD | COMMUNITY
End: 2023-01-01

## 2023-01-01 RX ORDER — LIRAGLUTIDE 6 MG/ML
1.8 INJECTION SUBCUTANEOUS DAILY
Status: ON HOLD | COMMUNITY
End: 2023-01-01

## 2023-01-01 RX ORDER — DEXTROSE MONOHYDRATE 50 MG/ML
INJECTION, SOLUTION INTRAVENOUS CONTINUOUS
Status: DISCONTINUED | OUTPATIENT
Start: 2023-01-01 | End: 2023-01-01

## 2023-01-01 RX ORDER — IBUPROFEN 600 MG/1
600 TABLET, FILM COATED ORAL EVERY 6 HOURS PRN
Status: DISCONTINUED | OUTPATIENT
Start: 2023-01-01 | End: 2023-01-01 | Stop reason: HOSPADM

## 2023-01-01 RX ORDER — CEFAZOLIN SODIUM 2 G/100ML
2 INJECTION, SOLUTION INTRAVENOUS EVERY 8 HOURS
Status: COMPLETED | OUTPATIENT
Start: 2023-01-01 | End: 2023-01-01

## 2023-01-01 RX ORDER — VITAMIN B COMPLEX
50 TABLET ORAL DAILY
Status: ON HOLD | DISCHARGE
Start: 2023-01-01 | End: 2023-01-01

## 2023-01-01 RX ORDER — METHOCARBAMOL 500 MG/1
500 TABLET, FILM COATED ORAL 4 TIMES DAILY PRN
Status: CANCELLED | OUTPATIENT
Start: 2023-01-01

## 2023-01-01 RX ORDER — GABAPENTIN 300 MG/1
300 CAPSULE ORAL AT BEDTIME
Status: DISCONTINUED | OUTPATIENT
Start: 2023-01-01 | End: 2023-01-01 | Stop reason: HOSPADM

## 2023-01-01 RX ORDER — ACETAMINOPHEN 325 MG/1
975 TABLET ORAL EVERY 8 HOURS
Status: DISCONTINUED | OUTPATIENT
Start: 2023-01-01 | End: 2023-01-01 | Stop reason: HOSPADM

## 2023-01-01 RX ORDER — CEFAZOLIN SODIUM 1 G/50ML
2000 SOLUTION INTRAVENOUS EVERY 24 HOURS
Status: DISCONTINUED | OUTPATIENT
Start: 2023-01-01 | End: 2023-01-01

## 2023-01-01 RX ORDER — TRAMADOL HYDROCHLORIDE 50 MG/1
50 TABLET ORAL EVERY 6 HOURS PRN
Status: DISCONTINUED | OUTPATIENT
Start: 2023-01-01 | End: 2023-01-01 | Stop reason: HOSPADM

## 2023-01-01 RX ORDER — VANCOMYCIN HYDROCHLORIDE 500 MG/10ML
500 INJECTION, POWDER, LYOPHILIZED, FOR SOLUTION INTRAVENOUS
Status: DISCONTINUED | OUTPATIENT
Start: 2023-01-01 | End: 2023-01-01

## 2023-01-01 RX ORDER — ACETAMINOPHEN 325 MG/1
975 TABLET ORAL 3 TIMES DAILY
Qty: 300 TABLET | Refills: 0 | Status: SHIPPED | OUTPATIENT
Start: 2023-01-01 | End: 2023-01-01

## 2023-01-01 RX ORDER — MAGNESIUM OXIDE 400 MG/1
400 TABLET ORAL DAILY
Status: DISCONTINUED | OUTPATIENT
Start: 2023-01-01 | End: 2023-01-01 | Stop reason: HOSPADM

## 2023-01-01 RX ORDER — CEFTAZIDIME 2 G/1
2 INJECTION, POWDER, FOR SOLUTION INTRAVENOUS EVERY 24 HOURS
Status: COMPLETED | OUTPATIENT
Start: 2023-01-01 | End: 2023-01-01

## 2023-01-01 RX ORDER — METHOCARBAMOL 500 MG/1
500 TABLET, FILM COATED ORAL EVERY 6 HOURS PRN
Qty: 30 TABLET | Refills: 0 | DISCHARGE
Start: 2023-01-01

## 2023-01-01 RX ORDER — NITROGLYCERIN 0.4 MG/1
0.4 TABLET SUBLINGUAL EVERY 5 MIN PRN
Status: DISCONTINUED | OUTPATIENT
Start: 2023-01-01 | End: 2023-01-01 | Stop reason: HOSPADM

## 2023-01-01 RX ORDER — CARVEDILOL 3.12 MG/1
6.25 TABLET ORAL 2 TIMES DAILY WITH MEALS
Status: DISCONTINUED | OUTPATIENT
Start: 2023-01-01 | End: 2023-01-01 | Stop reason: HOSPADM

## 2023-01-01 RX ORDER — NITROGLYCERIN 0.4 MG/1
TABLET SUBLINGUAL
Qty: 30 TABLET | Refills: 0 | DISCHARGE
Start: 2023-01-01

## 2023-01-01 RX ORDER — VANCOMYCIN HYDROCHLORIDE 1 G/200ML
1000 INJECTION, SOLUTION INTRAVENOUS EVERY 24 HOURS
Status: DISCONTINUED | OUTPATIENT
Start: 2023-01-01 | End: 2023-01-01

## 2023-01-01 RX ORDER — HEPARIN SODIUM,PORCINE 10 UNIT/ML
2 VIAL (ML) INTRAVENOUS ONCE
Status: COMPLETED | OUTPATIENT
Start: 2023-01-01 | End: 2023-01-01

## 2023-01-01 RX ORDER — METHOCARBAMOL 500 MG/1
500 TABLET, FILM COATED ORAL AT BEDTIME
Status: DISCONTINUED | OUTPATIENT
Start: 2023-01-01 | End: 2023-01-01 | Stop reason: HOSPADM

## 2023-01-01 RX ORDER — METHOCARBAMOL 500 MG/1
500 TABLET, FILM COATED ORAL 4 TIMES DAILY PRN
Status: DISCONTINUED | OUTPATIENT
Start: 2023-01-01 | End: 2023-01-01 | Stop reason: HOSPADM

## 2023-01-01 RX ORDER — LIDOCAINE 4 G/G
1 PATCH TOPICAL EVERY 24 HOURS
Qty: 30 PATCH | Refills: 0 | Status: SHIPPED | OUTPATIENT
Start: 2023-01-01 | End: 2023-01-01

## 2023-01-01 RX ORDER — CIPROFLOXACIN 500 MG/1
500 TABLET, FILM COATED ORAL EVERY 12 HOURS SCHEDULED
Status: DISCONTINUED | OUTPATIENT
Start: 2023-01-01 | End: 2023-01-01 | Stop reason: HOSPADM

## 2023-01-01 RX ORDER — AMLODIPINE BESYLATE 5 MG/1
5 TABLET ORAL DAILY
Status: DISCONTINUED | OUTPATIENT
Start: 2023-01-01 | End: 2023-01-01

## 2023-01-01 RX ORDER — POTASSIUM CHLORIDE 750 MG/1
40 TABLET, EXTENDED RELEASE ORAL ONCE
Status: DISCONTINUED | OUTPATIENT
Start: 2023-01-01 | End: 2023-01-01

## 2023-01-01 RX ORDER — HYDROMORPHONE HYDROCHLORIDE 1 MG/ML
0.4 INJECTION, SOLUTION INTRAMUSCULAR; INTRAVENOUS; SUBCUTANEOUS
Status: DISCONTINUED | OUTPATIENT
Start: 2023-01-01 | End: 2023-01-01 | Stop reason: HOSPADM

## 2023-01-01 RX ORDER — CEFAZOLIN SODIUM 1 G/50ML
2000 SOLUTION INTRAVENOUS ONCE
Status: COMPLETED | OUTPATIENT
Start: 2023-01-01 | End: 2023-01-01

## 2023-01-01 RX ORDER — HEPARIN SODIUM 5000 [USP'U]/.5ML
5000 INJECTION, SOLUTION INTRAVENOUS; SUBCUTANEOUS EVERY 12 HOURS
Status: DISCONTINUED | OUTPATIENT
Start: 2023-01-01 | End: 2023-01-01

## 2023-01-01 RX ORDER — CEFAZOLIN SODIUM 2 G/50ML
2 SOLUTION INTRAVENOUS SEE ADMIN INSTRUCTIONS
Status: CANCELLED | OUTPATIENT
Start: 2023-01-01

## 2023-01-01 RX ORDER — CELECOXIB 200 MG/1
400 CAPSULE ORAL ONCE
Status: COMPLETED | OUTPATIENT
Start: 2023-01-01 | End: 2023-01-01

## 2023-01-01 RX ORDER — LIDOCAINE 40 MG/G
CREAM TOPICAL
Status: ON HOLD | DISCHARGE
Start: 2023-01-01 | End: 2023-01-01

## 2023-01-01 RX ORDER — OXYCODONE HYDROCHLORIDE 5 MG/1
5 TABLET ORAL 2 TIMES DAILY PRN
Status: DISCONTINUED | OUTPATIENT
Start: 2023-01-01 | End: 2023-01-01

## 2023-01-01 RX ORDER — FERROUS SULFATE 325(65) MG
325 TABLET ORAL EVERY OTHER DAY
Qty: 15 TABLET | Refills: 0 | DISCHARGE
Start: 2023-01-01

## 2023-01-01 RX ORDER — DOCUSATE SODIUM 100 MG/1
100 CAPSULE, LIQUID FILLED ORAL 2 TIMES DAILY
Status: ON HOLD | COMMUNITY
End: 2023-01-01

## 2023-01-01 RX ORDER — AMOXICILLIN 250 MG
1 CAPSULE ORAL
Status: DISCONTINUED | OUTPATIENT
Start: 2023-01-01 | End: 2023-01-01 | Stop reason: HOSPADM

## 2023-01-01 RX ORDER — CELECOXIB 100 MG/1
400 CAPSULE ORAL ONCE
Status: CANCELLED | OUTPATIENT
Start: 2023-01-01 | End: 2023-01-01

## 2023-01-01 RX ORDER — LIRAGLUTIDE 6 MG/ML
1.8 INJECTION SUBCUTANEOUS DAILY
Status: DISCONTINUED | OUTPATIENT
Start: 2023-01-01 | End: 2023-01-01 | Stop reason: HOSPADM

## 2023-01-01 RX ORDER — HALOPERIDOL 5 MG/ML
2 INJECTION INTRAMUSCULAR EVERY 6 HOURS PRN
Status: DISCONTINUED | OUTPATIENT
Start: 2023-01-01 | End: 2023-01-01 | Stop reason: HOSPADM

## 2023-01-01 RX ORDER — POTASSIUM CHLORIDE 1.5 G/1.58G
20 POWDER, FOR SOLUTION ORAL ONCE
Status: COMPLETED | OUTPATIENT
Start: 2023-01-01 | End: 2023-01-01

## 2023-01-01 RX ORDER — LANOLIN ALCOHOL/MO/W.PET/CERES
100 CREAM (GRAM) TOPICAL DAILY
Status: ON HOLD | DISCHARGE
Start: 2023-01-01 | End: 2023-01-01

## 2023-01-01 RX ORDER — CALCIUM CARBONATE 500 MG/1
1000 TABLET, CHEWABLE ORAL 4 TIMES DAILY PRN
Status: DISCONTINUED | OUTPATIENT
Start: 2023-01-01 | End: 2023-01-01 | Stop reason: HOSPADM

## 2023-01-01 RX ORDER — CALCIUM CARBONATE 500 MG/1
1 TABLET, CHEWABLE ORAL 4 TIMES DAILY PRN
Qty: 100 TABLET | Refills: 0 | Status: SHIPPED | OUTPATIENT
Start: 2023-01-01 | End: 2023-01-01

## 2023-01-01 RX ORDER — POTASSIUM CHLORIDE 20MEQ/15ML
20 LIQUID (ML) ORAL ONCE
Status: COMPLETED | OUTPATIENT
Start: 2023-01-01 | End: 2023-01-01

## 2023-01-01 RX ORDER — LACTOBACILLUS RHAMNOSUS GG 10B CELL
1 CAPSULE ORAL 2 TIMES DAILY
Qty: 60 CAPSULE | Refills: 0 | Status: SHIPPED | OUTPATIENT
Start: 2023-01-01 | End: 2023-01-01

## 2023-01-01 RX ORDER — CEFAZOLIN SODIUM 2 G/100ML
2 INJECTION, SOLUTION INTRAVENOUS
Status: CANCELLED | OUTPATIENT
Start: 2023-01-01

## 2023-01-01 RX ORDER — CIPROFLOXACIN 500 MG/1
500 TABLET, FILM COATED ORAL EVERY 12 HOURS
Qty: 56 TABLET | Refills: 0 | Status: ON HOLD | DISCHARGE
Start: 2023-01-01 | End: 2023-01-01

## 2023-01-01 RX ORDER — TRAMADOL HYDROCHLORIDE 50 MG/1
25-50 TABLET ORAL EVERY 6 HOURS PRN
Status: ON HOLD | COMMUNITY
End: 2023-01-01

## 2023-01-01 RX ORDER — FERROUS SULFATE 325(65) MG
325 TABLET ORAL EVERY OTHER DAY
Status: DISCONTINUED | OUTPATIENT
Start: 2023-01-01 | End: 2023-01-01 | Stop reason: HOSPADM

## 2023-01-01 RX ORDER — CALCIUM CARBONATE 500 MG/1
1 TABLET, CHEWABLE ORAL 4 TIMES DAILY PRN
Qty: 100 TABLET | Refills: 0 | DISCHARGE
Start: 2023-01-01

## 2023-01-01 RX ORDER — ALBUTEROL SULFATE 0.83 MG/ML
2.5 SOLUTION RESPIRATORY (INHALATION) EVERY 4 HOURS PRN
Status: DISCONTINUED | OUTPATIENT
Start: 2023-01-01 | End: 2023-01-01 | Stop reason: HOSPADM

## 2023-01-01 RX ORDER — ACETAMINOPHEN 325 MG/1
975 TABLET ORAL ONCE
Status: COMPLETED | OUTPATIENT
Start: 2023-01-01 | End: 2023-01-01

## 2023-01-01 RX ORDER — GADOBUTROL 604.72 MG/ML
13 INJECTION INTRAVENOUS ONCE
Status: COMPLETED | OUTPATIENT
Start: 2023-01-01 | End: 2023-01-01

## 2023-01-01 RX ORDER — HALOPERIDOL 5 MG/1
5 TABLET ORAL EVERY 6 HOURS PRN
Status: DISCONTINUED | OUTPATIENT
Start: 2023-01-01 | End: 2023-01-01

## 2023-01-01 RX ORDER — DOXYCYCLINE 100 MG/1
100 CAPSULE ORAL EVERY 12 HOURS
Status: DISCONTINUED | OUTPATIENT
Start: 2023-01-01 | End: 2023-01-01

## 2023-01-01 RX ORDER — MAGNESIUM HYDROXIDE 1200 MG/15ML
LIQUID ORAL PRN
Status: DISCONTINUED | OUTPATIENT
Start: 2023-01-01 | End: 2023-01-01 | Stop reason: HOSPADM

## 2023-01-01 RX ORDER — CARVEDILOL 6.25 MG/1
6.25 TABLET ORAL 2 TIMES DAILY WITH MEALS
Status: ON HOLD | DISCHARGE
Start: 2023-01-01 | End: 2023-01-01

## 2023-01-01 RX ORDER — TAMSULOSIN HYDROCHLORIDE 0.4 MG/1
0.4 CAPSULE ORAL DAILY
Qty: 30 CAPSULE | Refills: 0 | Status: SHIPPED | OUTPATIENT
Start: 2023-01-01 | End: 2023-01-01

## 2023-01-01 RX ORDER — DOCUSATE SODIUM 100 MG/1
100 CAPSULE, LIQUID FILLED ORAL 2 TIMES DAILY
Status: DISCONTINUED | OUTPATIENT
Start: 2023-01-01 | End: 2023-01-01

## 2023-01-01 RX ORDER — POLYETHYLENE GLYCOL 3350 17 G/17G
17 POWDER, FOR SOLUTION ORAL DAILY PRN
Qty: 510 G | Refills: 0 | Status: SHIPPED | OUTPATIENT
Start: 2023-01-01 | End: 2023-01-01

## 2023-01-01 RX ORDER — ACETAMINOPHEN 325 MG/1
975 TABLET ORAL EVERY 6 HOURS PRN
Status: DISCONTINUED | OUTPATIENT
Start: 2023-01-01 | End: 2023-01-01 | Stop reason: HOSPADM

## 2023-01-01 RX ORDER — IBUPROFEN 600 MG/1
600 TABLET, FILM COATED ORAL EVERY 6 HOURS PRN
Status: ON HOLD | DISCHARGE
Start: 2023-01-01 | End: 2023-01-01

## 2023-01-01 RX ORDER — TRANEXAMIC ACID 650 MG/1
1950 TABLET ORAL ONCE
Status: COMPLETED | OUTPATIENT
Start: 2023-01-01 | End: 2023-01-01

## 2023-01-01 RX ORDER — MULTIPLE VITAMINS W/ MINERALS TAB 9MG-400MCG
1 TAB ORAL DAILY
Status: ON HOLD | DISCHARGE
Start: 2023-01-01 | End: 2023-01-01

## 2023-01-01 RX ORDER — NALOXONE HYDROCHLORIDE 0.4 MG/ML
0.2 INJECTION, SOLUTION INTRAMUSCULAR; INTRAVENOUS; SUBCUTANEOUS
Status: CANCELLED | OUTPATIENT
Start: 2023-01-01

## 2023-01-01 RX ORDER — MULTIPLE VITAMINS W/ MINERALS TAB 9MG-400MCG
1 TAB ORAL DAILY
Qty: 30 TABLET | Refills: 0 | DISCHARGE
Start: 2023-01-01

## 2023-01-01 RX ORDER — AMOXICILLIN 250 MG
1 CAPSULE ORAL 2 TIMES DAILY PRN
Qty: 30 TABLET | Refills: 0 | Status: SHIPPED | OUTPATIENT
Start: 2023-01-01 | End: 2023-01-01

## 2023-01-01 RX ORDER — ACYCLOVIR 200 MG/1
9.5 CAPSULE ORAL
Status: DISCONTINUED | OUTPATIENT
Start: 2023-01-01 | End: 2023-01-01 | Stop reason: HOSPADM

## 2023-01-01 RX ORDER — HALOPERIDOL 5 MG/ML
5 INJECTION INTRAMUSCULAR EVERY 6 HOURS PRN
Status: DISCONTINUED | OUTPATIENT
Start: 2023-01-01 | End: 2023-01-01

## 2023-01-01 RX ORDER — OXYCODONE HYDROCHLORIDE 5 MG/1
5-10 TABLET ORAL EVERY 4 HOURS PRN
Status: DISCONTINUED | OUTPATIENT
Start: 2023-01-01 | End: 2023-01-01

## 2023-01-01 RX ORDER — LIDOCAINE HYDROCHLORIDE 10 MG/ML
1-10 INJECTION, SOLUTION EPIDURAL; INFILTRATION; INTRACAUDAL; PERINEURAL ONCE
Status: DISCONTINUED | OUTPATIENT
Start: 2023-01-01 | End: 2023-01-01

## 2023-01-01 RX ORDER — LIDOCAINE HYDROCHLORIDE 20 MG/ML
15 SOLUTION OROPHARYNGEAL ONCE
Status: COMPLETED | OUTPATIENT
Start: 2023-01-01 | End: 2023-01-01

## 2023-01-01 RX ORDER — SIMETHICONE 80 MG
80 TABLET,CHEWABLE ORAL EVERY 6 HOURS PRN
Status: DISCONTINUED | OUTPATIENT
Start: 2023-01-01 | End: 2023-01-01 | Stop reason: HOSPADM

## 2023-01-01 RX ORDER — LIDOCAINE 4 G/G
1-2 PATCH TOPICAL
Status: CANCELLED | OUTPATIENT
Start: 2023-01-01

## 2023-01-01 RX ORDER — CARVEDILOL 6.25 MG/1
6.25 TABLET ORAL 2 TIMES DAILY WITH MEALS
Qty: 60 TABLET | Refills: 0 | DISCHARGE
Start: 2023-01-01

## 2023-01-01 RX ORDER — LANOLIN ALCOHOL/MO/W.PET/CERES
3 CREAM (GRAM) TOPICAL AT BEDTIME
Qty: 30 TABLET | Refills: 0 | DISCHARGE
Start: 2023-01-01

## 2023-01-01 RX ORDER — AMLODIPINE BESYLATE 10 MG/1
10 TABLET ORAL DAILY
Qty: 30 TABLET | Refills: 0 | DISCHARGE
Start: 2023-01-01

## 2023-01-01 RX ORDER — CEFAZOLIN SODIUM 2 G/100ML
2 INJECTION, SOLUTION INTRAVENOUS ONCE
Status: COMPLETED | OUTPATIENT
Start: 2023-01-01 | End: 2023-01-01

## 2023-01-01 RX ORDER — POLYETHYLENE GLYCOL 3350 17 G/17G
17 POWDER, FOR SOLUTION ORAL DAILY PRN
Qty: 510 G | Refills: 0 | DISCHARGE
Start: 2023-01-01

## 2023-01-01 RX ORDER — TRAMADOL HYDROCHLORIDE 50 MG/1
50 TABLET ORAL EVERY 6 HOURS PRN
Status: DISCONTINUED | OUTPATIENT
Start: 2023-01-01 | End: 2023-01-01

## 2023-01-01 RX ORDER — HEPARIN SODIUM,PORCINE 10 UNIT/ML
5-20 VIAL (ML) INTRAVENOUS
Status: DISCONTINUED | OUTPATIENT
Start: 2023-01-01 | End: 2023-01-01

## 2023-01-01 RX ORDER — LANOLIN ALCOHOL/MO/W.PET/CERES
3 CREAM (GRAM) TOPICAL AT BEDTIME
Qty: 30 TABLET | Refills: 0 | Status: SHIPPED | OUTPATIENT
Start: 2023-01-01 | End: 2023-01-01

## 2023-01-01 RX ORDER — OXYCODONE HYDROCHLORIDE 5 MG/1
5 TABLET ORAL EVERY 6 HOURS PRN
Qty: 6 TABLET | Refills: 0 | Status: ON HOLD | OUTPATIENT
Start: 2023-01-01 | End: 2023-01-01

## 2023-01-01 RX ORDER — CARVEDILOL 3.12 MG/1
3.12 TABLET ORAL 2 TIMES DAILY WITH MEALS
Status: DISCONTINUED | OUTPATIENT
Start: 2023-01-01 | End: 2023-01-01

## 2023-01-01 RX ORDER — LIDOCAINE 40 MG/G
CREAM TOPICAL
Status: ACTIVE | OUTPATIENT
Start: 2023-01-01 | End: 2023-01-01

## 2023-01-01 RX ORDER — LACTOBACILLUS RHAMNOSUS GG 10B CELL
1 CAPSULE ORAL 2 TIMES DAILY
Qty: 60 CAPSULE | Refills: 0 | DISCHARGE
Start: 2023-01-01

## 2023-01-01 RX ORDER — AMOXICILLIN 250 MG
1 CAPSULE ORAL 2 TIMES DAILY
Status: ON HOLD | DISCHARGE
Start: 2023-01-01 | End: 2023-01-01

## 2023-01-01 RX ORDER — HEPARIN SODIUM,PORCINE 10 UNIT/ML
5-20 VIAL (ML) INTRAVENOUS EVERY 24 HOURS
Status: DISCONTINUED | OUTPATIENT
Start: 2023-01-01 | End: 2023-01-01 | Stop reason: HOSPADM

## 2023-01-01 RX ORDER — LIDOCAINE HYDROCHLORIDE 10 MG/ML
10 INJECTION, SOLUTION EPIDURAL; INFILTRATION; INTRACAUDAL; PERINEURAL ONCE
Status: COMPLETED | OUTPATIENT
Start: 2023-01-01 | End: 2023-01-01

## 2023-01-01 RX ORDER — CIPROFLOXACIN 250 MG/1
750 TABLET, FILM COATED ORAL 2 TIMES DAILY
Status: DISCONTINUED | OUTPATIENT
Start: 2023-01-01 | End: 2023-01-01

## 2023-01-01 RX ORDER — ATORVASTATIN CALCIUM 40 MG/1
40 TABLET, FILM COATED ORAL DAILY
Status: CANCELLED | OUTPATIENT
Start: 2023-01-01

## 2023-01-01 RX ORDER — LIDOCAINE 4 G/G
1-2 PATCH TOPICAL
Status: DISCONTINUED | OUTPATIENT
Start: 2023-01-01 | End: 2023-01-01 | Stop reason: HOSPADM

## 2023-01-01 RX ORDER — POLYETHYLENE GLYCOL 3350 17 G/17G
17 POWDER, FOR SOLUTION ORAL 2 TIMES DAILY
Status: DISCONTINUED | OUTPATIENT
Start: 2023-01-01 | End: 2023-01-01

## 2023-01-01 RX ORDER — OXYCODONE HYDROCHLORIDE 5 MG/1
5 TABLET ORAL DAILY PRN
Status: DISCONTINUED | OUTPATIENT
Start: 2023-01-01 | End: 2023-01-01

## 2023-01-01 RX ORDER — ACETAMINOPHEN 325 MG/1
975 TABLET ORAL ONCE
Status: CANCELLED | OUTPATIENT
Start: 2023-01-01 | End: 2023-01-01

## 2023-01-01 RX ORDER — POTASSIUM CHLORIDE 1.5 G/1.58G
40 POWDER, FOR SOLUTION ORAL ONCE
Status: COMPLETED | OUTPATIENT
Start: 2023-01-01 | End: 2023-01-01

## 2023-01-01 RX ADMIN — CARVEDILOL 6.25 MG: 6.25 TABLET, FILM COATED ORAL at 09:06

## 2023-01-01 RX ADMIN — APIXABAN 5 MG: 2.5 TABLET, FILM COATED ORAL at 21:43

## 2023-01-01 RX ADMIN — DICLOFENAC SODIUM 2 G: 10 GEL TOPICAL at 15:57

## 2023-01-01 RX ADMIN — Medication 1 CAPSULE: at 08:48

## 2023-01-01 RX ADMIN — ACETAMINOPHEN 650 MG: 325 TABLET, FILM COATED ORAL at 03:19

## 2023-01-01 RX ADMIN — LIDOCAINE HYDROCHLORIDE 80 MG: 20 INJECTION, SOLUTION INFILTRATION; PERINEURAL at 14:24

## 2023-01-01 RX ADMIN — ATORVASTATIN CALCIUM 40 MG: 40 TABLET, FILM COATED ORAL at 11:06

## 2023-01-01 RX ADMIN — ACETAMINOPHEN 1000 MG: 500 TABLET ORAL at 10:11

## 2023-01-01 RX ADMIN — FOLIC ACID 1 MG: 1 TABLET ORAL at 08:56

## 2023-01-01 RX ADMIN — MELATONIN TAB 3 MG 3 MG: 3 TAB at 21:00

## 2023-01-01 RX ADMIN — Medication 2 G: at 08:09

## 2023-01-01 RX ADMIN — Medication 2 G: at 11:51

## 2023-01-01 RX ADMIN — SODIUM CHLORIDE 500 ML: 9 INJECTION, SOLUTION INTRAVENOUS at 09:32

## 2023-01-01 RX ADMIN — ATORVASTATIN CALCIUM 40 MG: 40 TABLET, FILM COATED ORAL at 08:32

## 2023-01-01 RX ADMIN — CIPROFLOXACIN HYDROCHLORIDE 500 MG: 500 TABLET, FILM COATED ORAL at 22:19

## 2023-01-01 RX ADMIN — Medication 1 MG: at 00:23

## 2023-01-01 RX ADMIN — Medication 1 CAPSULE: at 20:55

## 2023-01-01 RX ADMIN — Medication 50 MCG: at 11:03

## 2023-01-01 RX ADMIN — AMLODIPINE BESYLATE 10 MG: 10 TABLET ORAL at 08:42

## 2023-01-01 RX ADMIN — ACETAMINOPHEN 1000 MG: 500 TABLET ORAL at 21:02

## 2023-01-01 RX ADMIN — Medication 1 CAPSULE: at 10:22

## 2023-01-01 RX ADMIN — VANCOMYCIN HYDROCHLORIDE 500 MG: 500 INJECTION, POWDER, LYOPHILIZED, FOR SOLUTION INTRAVENOUS at 15:31

## 2023-01-01 RX ADMIN — CEFEPIME HYDROCHLORIDE 2 G: 2 INJECTION, POWDER, FOR SOLUTION INTRAVENOUS at 12:05

## 2023-01-01 RX ADMIN — APIXABAN 5 MG: 2.5 TABLET, FILM COATED ORAL at 20:33

## 2023-01-01 RX ADMIN — Medication 50 MG: at 20:01

## 2023-01-01 RX ADMIN — SENNOSIDES AND DOCUSATE SODIUM 1 TABLET: 50; 8.6 TABLET ORAL at 10:42

## 2023-01-01 RX ADMIN — METHOCARBAMOL 500 MG: 500 TABLET ORAL at 08:17

## 2023-01-01 RX ADMIN — ATORVASTATIN CALCIUM 40 MG: 40 TABLET, FILM COATED ORAL at 10:36

## 2023-01-01 RX ADMIN — Medication 25 MG: at 03:21

## 2023-01-01 RX ADMIN — APIXABAN 5 MG: 2.5 TABLET, FILM COATED ORAL at 21:11

## 2023-01-01 RX ADMIN — ACETAMINOPHEN 1000 MG: 500 TABLET ORAL at 21:58

## 2023-01-01 RX ADMIN — Medication 25 MG: at 09:41

## 2023-01-01 RX ADMIN — PIOGLITAZONE HYDROCHLORIDE 45 MG: 45 TABLET ORAL at 09:31

## 2023-01-01 RX ADMIN — METHOCARBAMOL 500 MG: 500 TABLET ORAL at 21:49

## 2023-01-01 RX ADMIN — METFORMIN ER 500 MG 2000 MG: 500 TABLET ORAL at 11:37

## 2023-01-01 RX ADMIN — APIXABAN 5 MG: 2.5 TABLET, FILM COATED ORAL at 09:23

## 2023-01-01 RX ADMIN — INSULIN ASPART 1 UNITS: 100 INJECTION, SOLUTION INTRAVENOUS; SUBCUTANEOUS at 19:24

## 2023-01-01 RX ADMIN — METHOCARBAMOL 500 MG: 500 TABLET ORAL at 08:12

## 2023-01-01 RX ADMIN — Medication 50 MCG: at 09:23

## 2023-01-01 RX ADMIN — APIXABAN 2.5 MG: 2.5 TABLET, FILM COATED ORAL at 08:06

## 2023-01-01 RX ADMIN — MICONAZOLE NITRATE: 20 POWDER TOPICAL at 21:06

## 2023-01-01 RX ADMIN — SENNOSIDES AND DOCUSATE SODIUM 2 TABLET: 50; 8.6 TABLET ORAL at 21:41

## 2023-01-01 RX ADMIN — CIPROFLOXACIN HYDROCHLORIDE 500 MG: 500 TABLET, FILM COATED ORAL at 05:06

## 2023-01-01 RX ADMIN — Medication 25 MG: at 21:05

## 2023-01-01 RX ADMIN — FERROUS SULFATE TAB 325 MG (65 MG ELEMENTAL FE) 325 MG: 325 (65 FE) TAB at 12:29

## 2023-01-01 RX ADMIN — Medication 25 MG: at 03:46

## 2023-01-01 RX ADMIN — Medication 1 CAPSULE: at 20:11

## 2023-01-01 RX ADMIN — CEFEPIME HYDROCHLORIDE 2 G: 2 INJECTION, POWDER, FOR SOLUTION INTRAVENOUS at 14:47

## 2023-01-01 RX ADMIN — FUROSEMIDE 60 MG: 40 TABLET ORAL at 09:01

## 2023-01-01 RX ADMIN — ATORVASTATIN CALCIUM 40 MG: 40 TABLET, FILM COATED ORAL at 09:25

## 2023-01-01 RX ADMIN — Medication 50 MG: at 17:24

## 2023-01-01 RX ADMIN — APIXABAN 5 MG: 2.5 TABLET, FILM COATED ORAL at 10:37

## 2023-01-01 RX ADMIN — Medication 5 ML: at 14:19

## 2023-01-01 RX ADMIN — APIXABAN 5 MG: 2.5 TABLET, FILM COATED ORAL at 10:22

## 2023-01-01 RX ADMIN — MULTIPLE VITAMINS W/ MINERALS TAB 1 TABLET: TAB at 12:33

## 2023-01-01 RX ADMIN — DOXYCYCLINE HYCLATE 100 MG: 100 CAPSULE ORAL at 20:12

## 2023-01-01 RX ADMIN — METFORMIN ER 500 MG 2000 MG: 500 TABLET ORAL at 09:15

## 2023-01-01 RX ADMIN — LIRAGLUTIDE 1.8 MG: 6 INJECTION SUBCUTANEOUS at 10:16

## 2023-01-01 RX ADMIN — APIXABAN 5 MG: 2.5 TABLET, FILM COATED ORAL at 21:00

## 2023-01-01 RX ADMIN — CIPROFLOXACIN HYDROCHLORIDE 500 MG: 500 TABLET, FILM COATED ORAL at 09:07

## 2023-01-01 RX ADMIN — APIXABAN 5 MG: 2.5 TABLET, FILM COATED ORAL at 08:32

## 2023-01-01 RX ADMIN — MICONAZOLE NITRATE: 20 POWDER TOPICAL at 08:38

## 2023-01-01 RX ADMIN — CIPROFLOXACIN HYDROCHLORIDE 500 MG: 500 TABLET, FILM COATED ORAL at 20:02

## 2023-01-01 RX ADMIN — OXYCODONE HYDROCHLORIDE 5 MG: 5 TABLET ORAL at 08:59

## 2023-01-01 RX ADMIN — APIXABAN 5 MG: 2.5 TABLET, FILM COATED ORAL at 21:07

## 2023-01-01 RX ADMIN — FOLIC ACID 1 MG: 1 TABLET ORAL at 08:32

## 2023-01-01 RX ADMIN — Medication 50 MCG: at 13:37

## 2023-01-01 RX ADMIN — ATORVASTATIN CALCIUM 40 MG: 40 TABLET, FILM COATED ORAL at 10:50

## 2023-01-01 RX ADMIN — Medication 50 MCG: at 10:06

## 2023-01-01 RX ADMIN — Medication 1 CAPSULE: at 08:11

## 2023-01-01 RX ADMIN — CARVEDILOL 6.25 MG: 3.12 TABLET, FILM COATED ORAL at 18:54

## 2023-01-01 RX ADMIN — Medication 1 CAPSULE: at 09:54

## 2023-01-01 RX ADMIN — HEPARIN, PORCINE (PF) 10 UNIT/ML INTRAVENOUS SYRINGE 5 ML: at 06:05

## 2023-01-01 RX ADMIN — MULTIPLE VITAMINS W/ MINERALS TAB 1 TABLET: TAB at 12:16

## 2023-01-01 RX ADMIN — METFORMIN ER 500 MG 2000 MG: 500 TABLET ORAL at 10:15

## 2023-01-01 RX ADMIN — Medication 25 MG: at 08:31

## 2023-01-01 RX ADMIN — FUROSEMIDE 60 MG: 40 TABLET ORAL at 09:12

## 2023-01-01 RX ADMIN — ACETAMINOPHEN 325MG 975 MG: 325 TABLET ORAL at 13:36

## 2023-01-01 RX ADMIN — MICONAZOLE NITRATE: 20 POWDER TOPICAL at 20:11

## 2023-01-01 RX ADMIN — Medication 25 MG: at 16:36

## 2023-01-01 RX ADMIN — METHOCARBAMOL 500 MG: 500 TABLET ORAL at 14:57

## 2023-01-01 RX ADMIN — Medication 1 CAPSULE: at 20:54

## 2023-01-01 RX ADMIN — CARVEDILOL 6.25 MG: 6.25 TABLET, FILM COATED ORAL at 17:24

## 2023-01-01 RX ADMIN — INSULIN ASPART 1 UNITS: 100 INJECTION, SOLUTION INTRAVENOUS; SUBCUTANEOUS at 18:53

## 2023-01-01 RX ADMIN — APIXABAN 5 MG: 2.5 TABLET, FILM COATED ORAL at 20:54

## 2023-01-01 RX ADMIN — ATORVASTATIN CALCIUM 40 MG: 40 TABLET, FILM COATED ORAL at 08:28

## 2023-01-01 RX ADMIN — THIAMINE HCL TAB 100 MG 100 MG: 100 TAB at 09:07

## 2023-01-01 RX ADMIN — CARVEDILOL 6.25 MG: 3.12 TABLET, FILM COATED ORAL at 18:11

## 2023-01-01 RX ADMIN — MICONAZOLE NITRATE: 20 POWDER TOPICAL at 20:47

## 2023-01-01 RX ADMIN — CIPROFLOXACIN HYDROCHLORIDE 500 MG: 500 TABLET, FILM COATED ORAL at 10:36

## 2023-01-01 RX ADMIN — MICONAZOLE NITRATE: 20 POWDER TOPICAL at 21:14

## 2023-01-01 RX ADMIN — Medication 1 CAPSULE: at 20:29

## 2023-01-01 RX ADMIN — Medication 25 MG: at 15:01

## 2023-01-01 RX ADMIN — DEXTROSE MONOHYDRATE: 50 INJECTION, SOLUTION INTRAVENOUS at 04:25

## 2023-01-01 RX ADMIN — ATORVASTATIN CALCIUM 40 MG: 40 TABLET, FILM COATED ORAL at 13:09

## 2023-01-01 RX ADMIN — Medication 5 ML: at 14:45

## 2023-01-01 RX ADMIN — FOLIC ACID 1 MG: 1 TABLET ORAL at 08:34

## 2023-01-01 RX ADMIN — ACETAMINOPHEN 975 MG: 325 TABLET, FILM COATED ORAL at 09:13

## 2023-01-01 RX ADMIN — APIXABAN 2.5 MG: 2.5 TABLET, FILM COATED ORAL at 21:21

## 2023-01-01 RX ADMIN — APIXABAN 2.5 MG: 2.5 TABLET, FILM COATED ORAL at 08:39

## 2023-01-01 RX ADMIN — FOLIC ACID 1 MG: 1 TABLET ORAL at 09:14

## 2023-01-01 RX ADMIN — ACETAMINOPHEN 325MG 975 MG: 325 TABLET ORAL at 23:05

## 2023-01-01 RX ADMIN — METFORMIN ER 500 MG 2000 MG: 500 TABLET ORAL at 12:10

## 2023-01-01 RX ADMIN — LIRAGLUTIDE 1.8 MG: 6 INJECTION SUBCUTANEOUS at 08:29

## 2023-01-01 RX ADMIN — ATORVASTATIN CALCIUM 40 MG: 40 TABLET, FILM COATED ORAL at 09:01

## 2023-01-01 RX ADMIN — APIXABAN 5 MG: 2.5 TABLET, FILM COATED ORAL at 21:57

## 2023-01-01 RX ADMIN — PIOGLITAZONE HYDROCHLORIDE 45 MG: 45 TABLET ORAL at 09:54

## 2023-01-01 RX ADMIN — SENNOSIDES AND DOCUSATE SODIUM 1 TABLET: 8.6; 5 TABLET ORAL at 20:18

## 2023-01-01 RX ADMIN — PIOGLITAZONE HYDROCHLORIDE 45 MG: 45 TABLET ORAL at 08:18

## 2023-01-01 RX ADMIN — APIXABAN 5 MG: 5 TABLET, FILM COATED ORAL at 08:40

## 2023-01-01 RX ADMIN — APIXABAN 5 MG: 2.5 TABLET, FILM COATED ORAL at 09:18

## 2023-01-01 RX ADMIN — APIXABAN 2.5 MG: 2.5 TABLET, FILM COATED ORAL at 21:14

## 2023-01-01 RX ADMIN — DOXYCYCLINE 100 MG: 100 CAPSULE ORAL at 09:47

## 2023-01-01 RX ADMIN — APIXABAN 5 MG: 5 TABLET, FILM COATED ORAL at 08:56

## 2023-01-01 RX ADMIN — CARVEDILOL 6.25 MG: 6.25 TABLET, FILM COATED ORAL at 09:05

## 2023-01-01 RX ADMIN — CIPROFLOXACIN HYDROCHLORIDE 500 MG: 500 TABLET, FILM COATED ORAL at 20:04

## 2023-01-01 RX ADMIN — INSULIN ASPART 1 UNITS: 100 INJECTION, SOLUTION INTRAVENOUS; SUBCUTANEOUS at 12:14

## 2023-01-01 RX ADMIN — SENNOSIDES AND DOCUSATE SODIUM 1 TABLET: 8.6; 5 TABLET ORAL at 21:41

## 2023-01-01 RX ADMIN — Medication 1 CAPSULE: at 20:20

## 2023-01-01 RX ADMIN — OXYCODONE HYDROCHLORIDE 5 MG: 5 TABLET ORAL at 14:48

## 2023-01-01 RX ADMIN — METFORMIN HYDROCHLORIDE 2000 MG: 500 TABLET, EXTENDED RELEASE ORAL at 08:44

## 2023-01-01 RX ADMIN — METFORMIN ER 500 MG 2000 MG: 500 TABLET ORAL at 09:18

## 2023-01-01 RX ADMIN — ACETAMINOPHEN 325MG 975 MG: 325 TABLET ORAL at 20:01

## 2023-01-01 RX ADMIN — Medication 25 MG: at 04:14

## 2023-01-01 RX ADMIN — ATORVASTATIN CALCIUM 40 MG: 40 TABLET, FILM COATED ORAL at 10:06

## 2023-01-01 RX ADMIN — CIPROFLOXACIN HYDROCHLORIDE 500 MG: 500 TABLET, FILM COATED ORAL at 06:13

## 2023-01-01 RX ADMIN — METFORMIN HYDROCHLORIDE 2000 MG: 500 TABLET, EXTENDED RELEASE ORAL at 09:52

## 2023-01-01 RX ADMIN — Medication 1 CAPSULE: at 07:35

## 2023-01-01 RX ADMIN — APIXABAN 5 MG: 2.5 TABLET, FILM COATED ORAL at 20:36

## 2023-01-01 RX ADMIN — DOXYCYCLINE HYCLATE 100 MG: 100 CAPSULE ORAL at 20:39

## 2023-01-01 RX ADMIN — APIXABAN 5 MG: 2.5 TABLET, FILM COATED ORAL at 08:26

## 2023-01-01 RX ADMIN — Medication 1 CAPSULE: at 09:33

## 2023-01-01 RX ADMIN — Medication 1 CAPSULE: at 20:45

## 2023-01-01 RX ADMIN — MELATONIN TAB 3 MG 3 MG: 3 TAB at 21:34

## 2023-01-01 RX ADMIN — APIXABAN 2.5 MG: 2.5 TABLET, FILM COATED ORAL at 20:46

## 2023-01-01 RX ADMIN — MULTIPLE VITAMINS W/ MINERALS TAB 1 TABLET: TAB at 12:53

## 2023-01-01 RX ADMIN — ATORVASTATIN CALCIUM 40 MG: 40 TABLET, FILM COATED ORAL at 10:14

## 2023-01-01 RX ADMIN — ACETAMINOPHEN 325MG 975 MG: 325 TABLET ORAL at 04:44

## 2023-01-01 RX ADMIN — ATORVASTATIN CALCIUM 40 MG: 40 TABLET, FILM COATED ORAL at 08:30

## 2023-01-01 RX ADMIN — METHOCARBAMOL 500 MG: 500 TABLET ORAL at 09:33

## 2023-01-01 RX ADMIN — ATORVASTATIN CALCIUM 40 MG: 40 TABLET, FILM COATED ORAL at 08:20

## 2023-01-01 RX ADMIN — LIDOCAINE PATCH 4% 1 PATCH: 40 PATCH TOPICAL at 10:37

## 2023-01-01 RX ADMIN — HEPARIN, PORCINE (PF) 10 UNIT/ML INTRAVENOUS SYRINGE 5 ML: at 13:52

## 2023-01-01 RX ADMIN — Medication 2 G: at 09:17

## 2023-01-01 RX ADMIN — Medication 1 CAPSULE: at 09:23

## 2023-01-01 RX ADMIN — METFORMIN ER 500 MG 2000 MG: 500 TABLET ORAL at 08:58

## 2023-01-01 RX ADMIN — Medication 50 MCG: at 09:07

## 2023-01-01 RX ADMIN — ACETAMINOPHEN 325MG 975 MG: 325 TABLET ORAL at 18:54

## 2023-01-01 RX ADMIN — Medication 1 CAPSULE: at 21:14

## 2023-01-01 RX ADMIN — FERROUS SULFATE TAB 325 MG (65 MG ELEMENTAL FE) 325 MG: 325 (65 FE) TAB at 13:02

## 2023-01-01 RX ADMIN — Medication 2 G: at 20:27

## 2023-01-01 RX ADMIN — APIXABAN 5 MG: 2.5 TABLET, FILM COATED ORAL at 21:18

## 2023-01-01 RX ADMIN — APIXABAN 2.5 MG: 2.5 TABLET, FILM COATED ORAL at 08:24

## 2023-01-01 RX ADMIN — APIXABAN 5 MG: 2.5 TABLET, FILM COATED ORAL at 20:17

## 2023-01-01 RX ADMIN — SODIUM CHLORIDE, POTASSIUM CHLORIDE, SODIUM LACTATE AND CALCIUM CHLORIDE: 600; 310; 30; 20 INJECTION, SOLUTION INTRAVENOUS at 12:13

## 2023-01-01 RX ADMIN — MICONAZOLE NITRATE: 20 POWDER TOPICAL at 09:05

## 2023-01-01 RX ADMIN — ATORVASTATIN CALCIUM 40 MG: 40 TABLET, FILM COATED ORAL at 09:12

## 2023-01-01 RX ADMIN — Medication 2 G: at 20:21

## 2023-01-01 RX ADMIN — FOLIC ACID 1 MG: 1 TABLET ORAL at 08:11

## 2023-01-01 RX ADMIN — METFORMIN HYDROCHLORIDE 2000 MG: 500 TABLET, EXTENDED RELEASE ORAL at 09:39

## 2023-01-01 RX ADMIN — Medication 1 CAPSULE: at 08:50

## 2023-01-01 RX ADMIN — ACETAMINOPHEN 975 MG: 325 TABLET, FILM COATED ORAL at 06:12

## 2023-01-01 RX ADMIN — Medication 1 CAPSULE: at 21:47

## 2023-01-01 RX ADMIN — FOLIC ACID 1 MG: 1 TABLET ORAL at 14:06

## 2023-01-01 RX ADMIN — Medication 3 MG: at 22:08

## 2023-01-01 RX ADMIN — APIXABAN 5 MG: 2.5 TABLET, FILM COATED ORAL at 20:21

## 2023-01-01 RX ADMIN — MICONAZOLE NITRATE: 20 POWDER TOPICAL at 20:53

## 2023-01-01 RX ADMIN — APIXABAN 5 MG: 5 TABLET, FILM COATED ORAL at 20:15

## 2023-01-01 RX ADMIN — SENNOSIDES AND DOCUSATE SODIUM 1 TABLET: 50; 8.6 TABLET ORAL at 09:23

## 2023-01-01 RX ADMIN — MULTIPLE VITAMINS W/ MINERALS TAB 1 TABLET: TAB at 11:10

## 2023-01-01 RX ADMIN — Medication 50 MCG: at 08:23

## 2023-01-01 RX ADMIN — Medication 25 MG: at 09:36

## 2023-01-01 RX ADMIN — ATORVASTATIN CALCIUM 40 MG: 40 TABLET, FILM COATED ORAL at 08:19

## 2023-01-01 RX ADMIN — HYDROMORPHONE HYDROCHLORIDE 0.2 MG: 1 INJECTION, SOLUTION INTRAMUSCULAR; INTRAVENOUS; SUBCUTANEOUS at 18:43

## 2023-01-01 RX ADMIN — Medication 1 CAPSULE: at 20:52

## 2023-01-01 RX ADMIN — Medication 50 MCG: at 09:29

## 2023-01-01 RX ADMIN — APIXABAN 5 MG: 2.5 TABLET, FILM COATED ORAL at 20:23

## 2023-01-01 RX ADMIN — FUROSEMIDE 60 MG: 40 TABLET ORAL at 17:11

## 2023-01-01 RX ADMIN — APIXABAN 5 MG: 2.5 TABLET, FILM COATED ORAL at 20:44

## 2023-01-01 RX ADMIN — FOLIC ACID 1 MG: 1 TABLET ORAL at 09:07

## 2023-01-01 RX ADMIN — Medication 50 MCG: at 13:30

## 2023-01-01 RX ADMIN — CARVEDILOL 6.25 MG: 3.12 TABLET, FILM COATED ORAL at 18:51

## 2023-01-01 RX ADMIN — METHOCARBAMOL 500 MG: 500 TABLET ORAL at 16:22

## 2023-01-01 RX ADMIN — Medication 50 MCG: at 08:32

## 2023-01-01 RX ADMIN — ACETAMINOPHEN 1000 MG: 500 TABLET ORAL at 19:06

## 2023-01-01 RX ADMIN — MICONAZOLE NITRATE: 20 POWDER TOPICAL at 10:55

## 2023-01-01 RX ADMIN — Medication 25 MG: at 08:30

## 2023-01-01 RX ADMIN — Medication 1 CAPSULE: at 08:53

## 2023-01-01 RX ADMIN — SENNOSIDES AND DOCUSATE SODIUM 1 TABLET: 8.6; 5 TABLET ORAL at 09:12

## 2023-01-01 RX ADMIN — PIOGLITAZONE HYDROCHLORIDE 45 MG: 45 TABLET ORAL at 08:44

## 2023-01-01 RX ADMIN — OXYCODONE HYDROCHLORIDE 10 MG: 10 TABLET ORAL at 09:35

## 2023-01-01 RX ADMIN — LIRAGLUTIDE 1.8 MG: 6 INJECTION SUBCUTANEOUS at 12:12

## 2023-01-01 RX ADMIN — ACETAMINOPHEN 650 MG: 325 TABLET, FILM COATED ORAL at 20:00

## 2023-01-01 RX ADMIN — INSULIN ASPART 1 UNITS: 100 INJECTION, SOLUTION INTRAVENOUS; SUBCUTANEOUS at 18:56

## 2023-01-01 RX ADMIN — METRONIDAZOLE 500 MG: 500 TABLET ORAL at 22:31

## 2023-01-01 RX ADMIN — CIPROFLOXACIN HYDROCHLORIDE 500 MG: 500 TABLET, FILM COATED ORAL at 07:10

## 2023-01-01 RX ADMIN — CIPROFLOXACIN HYDROCHLORIDE 500 MG: 500 TABLET, FILM COATED ORAL at 08:54

## 2023-01-01 RX ADMIN — ACETAMINOPHEN 1000 MG: 500 TABLET ORAL at 18:54

## 2023-01-01 RX ADMIN — APIXABAN 5 MG: 2.5 TABLET, FILM COATED ORAL at 20:19

## 2023-01-01 RX ADMIN — APIXABAN 5 MG: 2.5 TABLET, FILM COATED ORAL at 20:11

## 2023-01-01 RX ADMIN — Medication 1 CAPSULE: at 21:41

## 2023-01-01 RX ADMIN — HEPARIN, PORCINE (PF) 10 UNIT/ML INTRAVENOUS SYRINGE 5 ML: at 16:48

## 2023-01-01 RX ADMIN — SENNOSIDES AND DOCUSATE SODIUM 1 TABLET: 50; 8.6 TABLET ORAL at 19:39

## 2023-01-01 RX ADMIN — ATORVASTATIN CALCIUM 40 MG: 40 TABLET, FILM COATED ORAL at 11:41

## 2023-01-01 RX ADMIN — DOXYCYCLINE HYCLATE 100 MG: 100 CAPSULE ORAL at 20:03

## 2023-01-01 RX ADMIN — Medication 1 CAPSULE: at 08:30

## 2023-01-01 RX ADMIN — PIOGLITAZONE HYDROCHLORIDE 45 MG: 45 TABLET ORAL at 10:19

## 2023-01-01 RX ADMIN — APIXABAN 5 MG: 5 TABLET, FILM COATED ORAL at 09:03

## 2023-01-01 RX ADMIN — ATORVASTATIN CALCIUM 40 MG: 40 TABLET, FILM COATED ORAL at 08:06

## 2023-01-01 RX ADMIN — Medication 25 MG: at 20:36

## 2023-01-01 RX ADMIN — APIXABAN 5 MG: 2.5 TABLET, FILM COATED ORAL at 21:41

## 2023-01-01 RX ADMIN — HEPARIN SODIUM 5000 UNITS: 5000 INJECTION, SOLUTION INTRAVENOUS; SUBCUTANEOUS at 08:57

## 2023-01-01 RX ADMIN — DOXYCYCLINE 100 MG: 100 CAPSULE ORAL at 18:49

## 2023-01-01 RX ADMIN — LIRAGLUTIDE 1.8 MG: 6 INJECTION SUBCUTANEOUS at 13:35

## 2023-01-01 RX ADMIN — HEPARIN, PORCINE (PF) 10 UNIT/ML INTRAVENOUS SYRINGE 5 ML: at 05:32

## 2023-01-01 RX ADMIN — Medication 3 MG: at 21:52

## 2023-01-01 RX ADMIN — PIOGLITAZONE HYDROCHLORIDE 45 MG: 45 TABLET ORAL at 10:38

## 2023-01-01 RX ADMIN — Medication 1 CAPSULE: at 08:12

## 2023-01-01 RX ADMIN — METHOCARBAMOL 500 MG: 500 TABLET ORAL at 14:36

## 2023-01-01 RX ADMIN — METHOCARBAMOL 500 MG: 500 TABLET ORAL at 10:03

## 2023-01-01 RX ADMIN — METHOCARBAMOL 500 MG: 500 TABLET ORAL at 08:30

## 2023-01-01 RX ADMIN — LIRAGLUTIDE 1.8 MG: 6 INJECTION SUBCUTANEOUS at 08:20

## 2023-01-01 RX ADMIN — APIXABAN 5 MG: 2.5 TABLET, FILM COATED ORAL at 09:57

## 2023-01-01 RX ADMIN — POLYETHYLENE GLYCOL 3350 17 G: 17 POWDER, FOR SOLUTION ORAL at 09:46

## 2023-01-01 RX ADMIN — ACETAMINOPHEN 650 MG: 325 TABLET, FILM COATED ORAL at 08:54

## 2023-01-01 RX ADMIN — SODIUM PHOSPHATE, DIBASIC AND SODIUM PHOSPHATE, MONOBASIC 1 ENEMA: 7; 19 ENEMA RECTAL at 10:46

## 2023-01-01 RX ADMIN — MICONAZOLE NITRATE: 20 POWDER TOPICAL at 21:22

## 2023-01-01 RX ADMIN — OXYCODONE HYDROCHLORIDE 10 MG: 5 TABLET ORAL at 16:57

## 2023-01-01 RX ADMIN — FOLIC ACID 1 MG: 1 TABLET ORAL at 09:41

## 2023-01-01 RX ADMIN — ONDANSETRON 4 MG: 2 INJECTION INTRAMUSCULAR; INTRAVENOUS at 18:00

## 2023-01-01 RX ADMIN — ATORVASTATIN CALCIUM 40 MG: 40 TABLET, FILM COATED ORAL at 09:23

## 2023-01-01 RX ADMIN — CIPROFLOXACIN HYDROCHLORIDE 500 MG: 500 TABLET, FILM COATED ORAL at 09:46

## 2023-01-01 RX ADMIN — Medication 1 CAPSULE: at 18:00

## 2023-01-01 RX ADMIN — MELATONIN TAB 3 MG 3 MG: 3 TAB at 21:35

## 2023-01-01 RX ADMIN — ATORVASTATIN CALCIUM 40 MG: 40 TABLET, FILM COATED ORAL at 09:40

## 2023-01-01 RX ADMIN — APIXABAN 5 MG: 5 TABLET, FILM COATED ORAL at 09:01

## 2023-01-01 RX ADMIN — Medication 50 MG: at 04:20

## 2023-01-01 RX ADMIN — TRAMADOL HYDROCHLORIDE 50 MG: 50 TABLET, COATED ORAL at 17:42

## 2023-01-01 RX ADMIN — APIXABAN 2.5 MG: 2.5 TABLET, FILM COATED ORAL at 20:55

## 2023-01-01 RX ADMIN — PIOGLITAZONE HYDROCHLORIDE 45 MG: 45 TABLET ORAL at 10:58

## 2023-01-01 RX ADMIN — ACETAMINOPHEN 325MG 975 MG: 325 TABLET ORAL at 04:21

## 2023-01-01 RX ADMIN — APIXABAN 2.5 MG: 2.5 TABLET, FILM COATED ORAL at 10:36

## 2023-01-01 RX ADMIN — FOLIC ACID 1 MG: 1 TABLET ORAL at 10:42

## 2023-01-01 RX ADMIN — ACETAMINOPHEN 975 MG: 325 TABLET, FILM COATED ORAL at 11:56

## 2023-01-01 RX ADMIN — DOXYCYCLINE 100 MG: 100 CAPSULE ORAL at 20:23

## 2023-01-01 RX ADMIN — Medication 1 CAPSULE: at 10:57

## 2023-01-01 RX ADMIN — ACETAMINOPHEN 975 MG: 325 TABLET, FILM COATED ORAL at 13:17

## 2023-01-01 RX ADMIN — Medication 50 MCG: at 09:00

## 2023-01-01 RX ADMIN — MICONAZOLE NITRATE: 20 POWDER TOPICAL at 20:45

## 2023-01-01 RX ADMIN — METHOCARBAMOL 500 MG: 500 TABLET ORAL at 21:47

## 2023-01-01 RX ADMIN — FOLIC ACID 1 MG: 1 TABLET ORAL at 08:02

## 2023-01-01 RX ADMIN — FOLIC ACID 1 MG: 1 TABLET ORAL at 09:06

## 2023-01-01 RX ADMIN — PIOGLITAZONE HYDROCHLORIDE 45 MG: 45 TABLET ORAL at 09:51

## 2023-01-01 RX ADMIN — APIXABAN 5 MG: 2.5 TABLET, FILM COATED ORAL at 21:29

## 2023-01-01 RX ADMIN — Medication 1 CAPSULE: at 19:57

## 2023-01-01 RX ADMIN — ACETAMINOPHEN 325MG 975 MG: 325 TABLET ORAL at 17:33

## 2023-01-01 RX ADMIN — APIXABAN 5 MG: 2.5 TABLET, FILM COATED ORAL at 20:28

## 2023-01-01 RX ADMIN — Medication 1 CAPSULE: at 09:04

## 2023-01-01 RX ADMIN — APIXABAN 5 MG: 2.5 TABLET, FILM COATED ORAL at 11:18

## 2023-01-01 RX ADMIN — APIXABAN 5 MG: 2.5 TABLET, FILM COATED ORAL at 20:46

## 2023-01-01 RX ADMIN — MAGNESIUM SULFATE HEPTAHYDRATE 4 G: 40 INJECTION, SOLUTION INTRAVENOUS at 02:58

## 2023-01-01 RX ADMIN — APIXABAN 5 MG: 5 TABLET, FILM COATED ORAL at 20:27

## 2023-01-01 RX ADMIN — METFORMIN ER 500 MG 2000 MG: 500 TABLET ORAL at 09:58

## 2023-01-01 RX ADMIN — APIXABAN 5 MG: 2.5 TABLET, FILM COATED ORAL at 08:50

## 2023-01-01 RX ADMIN — ATORVASTATIN CALCIUM 40 MG: 40 TABLET, FILM COATED ORAL at 08:58

## 2023-01-01 RX ADMIN — Medication 50 MCG: at 11:09

## 2023-01-01 RX ADMIN — Medication 1 CAPSULE: at 08:55

## 2023-01-01 RX ADMIN — OXYCODONE HYDROCHLORIDE 5 MG: 5 TABLET ORAL at 22:00

## 2023-01-01 RX ADMIN — ATORVASTATIN CALCIUM 40 MG: 40 TABLET, FILM COATED ORAL at 11:18

## 2023-01-01 RX ADMIN — CARVEDILOL 6.25 MG: 3.12 TABLET, FILM COATED ORAL at 09:48

## 2023-01-01 RX ADMIN — ACETAMINOPHEN 1000 MG: 325 TABLET ORAL at 20:23

## 2023-01-01 RX ADMIN — APIXABAN 5 MG: 2.5 TABLET, FILM COATED ORAL at 21:05

## 2023-01-01 RX ADMIN — Medication 50 MCG: at 11:19

## 2023-01-01 RX ADMIN — Medication 2 G: at 10:00

## 2023-01-01 RX ADMIN — DOXYCYCLINE HYCLATE 100 MG: 100 CAPSULE ORAL at 08:23

## 2023-01-01 RX ADMIN — APIXABAN 5 MG: 2.5 TABLET, FILM COATED ORAL at 10:11

## 2023-01-01 RX ADMIN — LIRAGLUTIDE 1.8 MG: 6 INJECTION SUBCUTANEOUS at 10:07

## 2023-01-01 RX ADMIN — ATORVASTATIN CALCIUM 40 MG: 40 TABLET, FILM COATED ORAL at 08:26

## 2023-01-01 RX ADMIN — METFORMIN ER 500 MG 2000 MG: 500 TABLET ORAL at 09:59

## 2023-01-01 RX ADMIN — HEPARIN SODIUM 5000 UNITS: 5000 INJECTION, SOLUTION INTRAVENOUS; SUBCUTANEOUS at 21:16

## 2023-01-01 RX ADMIN — MULTIPLE VITAMINS W/ MINERALS TAB 1 TABLET: TAB at 13:59

## 2023-01-01 RX ADMIN — DOXYCYCLINE HYCLATE 100 MG: 100 CAPSULE ORAL at 20:01

## 2023-01-01 RX ADMIN — POLYETHYLENE GLYCOL 3350 17 G: 17 POWDER, FOR SOLUTION ORAL at 20:06

## 2023-01-01 RX ADMIN — PIOGLITAZONE HYDROCHLORIDE 45 MG: 45 TABLET ORAL at 12:43

## 2023-01-01 RX ADMIN — ATORVASTATIN CALCIUM 40 MG: 40 TABLET, FILM COATED ORAL at 08:57

## 2023-01-01 RX ADMIN — METHOCARBAMOL 500 MG: 500 TABLET ORAL at 20:36

## 2023-01-01 RX ADMIN — METHOCARBAMOL 500 MG: 500 TABLET ORAL at 09:13

## 2023-01-01 RX ADMIN — MAGNESIUM OXIDE TAB 400 MG (241.3 MG ELEMENTAL MG) 400 MG: 400 (241.3 MG) TAB at 13:42

## 2023-01-01 RX ADMIN — ATORVASTATIN CALCIUM 40 MG: 40 TABLET, FILM COATED ORAL at 12:10

## 2023-01-01 RX ADMIN — MULTIPLE VITAMINS W/ MINERALS TAB 1 TABLET: TAB at 12:12

## 2023-01-01 RX ADMIN — PIOGLITAZONE HYDROCHLORIDE 45 MG: 45 TABLET ORAL at 09:12

## 2023-01-01 RX ADMIN — TRAMADOL HYDROCHLORIDE 50 MG: 50 TABLET, COATED ORAL at 09:52

## 2023-01-01 RX ADMIN — ACETAMINOPHEN 1000 MG: 500 TABLET ORAL at 10:17

## 2023-01-01 RX ADMIN — PIOGLITAZONE HYDROCHLORIDE 45 MG: 45 TABLET ORAL at 09:06

## 2023-01-01 RX ADMIN — DICLOFENAC SODIUM 2 G: 10 GEL TOPICAL at 20:16

## 2023-01-01 RX ADMIN — MICONAZOLE NITRATE: 20 POWDER TOPICAL at 20:32

## 2023-01-01 RX ADMIN — APIXABAN 2.5 MG: 2.5 TABLET, FILM COATED ORAL at 09:55

## 2023-01-01 RX ADMIN — Medication 1 CAPSULE: at 21:44

## 2023-01-01 RX ADMIN — ACETAMINOPHEN 325MG 975 MG: 325 TABLET ORAL at 05:50

## 2023-01-01 RX ADMIN — SENNOSIDES AND DOCUSATE SODIUM 1 TABLET: 8.6; 5 TABLET ORAL at 08:28

## 2023-01-01 RX ADMIN — Medication 25 MG: at 08:37

## 2023-01-01 RX ADMIN — LIDOCAINE PATCH 4% 1 PATCH: 40 PATCH TOPICAL at 22:10

## 2023-01-01 RX ADMIN — SODIUM CHLORIDE 500 ML: 0.9 INJECTION, SOLUTION INTRAVENOUS at 12:03

## 2023-01-01 RX ADMIN — APIXABAN 5 MG: 2.5 TABLET, FILM COATED ORAL at 08:30

## 2023-01-01 RX ADMIN — MULTIPLE VITAMINS W/ MINERALS TAB 1 TABLET: TAB at 13:45

## 2023-01-01 RX ADMIN — APIXABAN 5 MG: 2.5 TABLET, FILM COATED ORAL at 09:45

## 2023-01-01 RX ADMIN — CIPROFLOXACIN HYDROCHLORIDE 500 MG: 500 TABLET, FILM COATED ORAL at 21:05

## 2023-01-01 RX ADMIN — PIOGLITAZONE HYDROCHLORIDE 45 MG: 45 TABLET ORAL at 10:21

## 2023-01-01 RX ADMIN — DICLOFENAC SODIUM 2 G: 10 GEL TOPICAL at 20:56

## 2023-01-01 RX ADMIN — MICONAZOLE NITRATE: 20 POWDER TOPICAL at 22:43

## 2023-01-01 RX ADMIN — METHOCARBAMOL 500 MG: 500 TABLET ORAL at 20:09

## 2023-01-01 RX ADMIN — Medication 400 MG: at 08:19

## 2023-01-01 RX ADMIN — MAGNESIUM OXIDE TAB 400 MG (241.3 MG ELEMENTAL MG) 400 MG: 400 (241.3 MG) TAB at 12:57

## 2023-01-01 RX ADMIN — METFORMIN HYDROCHLORIDE 2000 MG: 500 TABLET, EXTENDED RELEASE ORAL at 09:28

## 2023-01-01 RX ADMIN — APIXABAN 5 MG: 2.5 TABLET, FILM COATED ORAL at 20:04

## 2023-01-01 RX ADMIN — LIRAGLUTIDE 1.8 MG: 6 INJECTION SUBCUTANEOUS at 11:09

## 2023-01-01 RX ADMIN — ATORVASTATIN CALCIUM 40 MG: 40 TABLET, FILM COATED ORAL at 08:52

## 2023-01-01 RX ADMIN — APIXABAN 5 MG: 2.5 TABLET, FILM COATED ORAL at 20:15

## 2023-01-01 RX ADMIN — ACETAMINOPHEN 975 MG: 325 TABLET, FILM COATED ORAL at 06:41

## 2023-01-01 RX ADMIN — THIAMINE HCL TAB 100 MG 100 MG: 100 TAB at 08:23

## 2023-01-01 RX ADMIN — Medication 5 ML: at 13:57

## 2023-01-01 RX ADMIN — Medication 1 CAPSULE: at 11:18

## 2023-01-01 RX ADMIN — ACETAMINOPHEN 1000 MG: 500 TABLET ORAL at 10:45

## 2023-01-01 RX ADMIN — Medication 1 CAPSULE: at 10:06

## 2023-01-01 RX ADMIN — ATORVASTATIN CALCIUM 40 MG: 40 TABLET, FILM COATED ORAL at 08:13

## 2023-01-01 RX ADMIN — FUROSEMIDE 60 MG: 40 TABLET ORAL at 15:16

## 2023-01-01 RX ADMIN — APIXABAN 2.5 MG: 2.5 TABLET, FILM COATED ORAL at 21:02

## 2023-01-01 RX ADMIN — ACETAMINOPHEN 325MG 975 MG: 325 TABLET ORAL at 05:51

## 2023-01-01 RX ADMIN — VANCOMYCIN HYDROCHLORIDE 1500 MG: 10 INJECTION, POWDER, LYOPHILIZED, FOR SOLUTION INTRAVENOUS at 11:45

## 2023-01-01 RX ADMIN — PIOGLITAZONE HYDROCHLORIDE 45 MG: 45 TABLET ORAL at 10:03

## 2023-01-01 RX ADMIN — APIXABAN 2.5 MG: 2.5 TABLET, FILM COATED ORAL at 08:02

## 2023-01-01 RX ADMIN — APIXABAN 2.5 MG: 2.5 TABLET, FILM COATED ORAL at 09:20

## 2023-01-01 RX ADMIN — ACETAMINOPHEN 1000 MG: 500 TABLET ORAL at 16:42

## 2023-01-01 RX ADMIN — Medication 2.5 MG: at 04:00

## 2023-01-01 RX ADMIN — AMLODIPINE BESYLATE 10 MG: 10 TABLET ORAL at 13:28

## 2023-01-01 RX ADMIN — ACETAMINOPHEN 1000 MG: 325 TABLET ORAL at 11:21

## 2023-01-01 RX ADMIN — CIPROFLOXACIN HYDROCHLORIDE 500 MG: 500 TABLET, FILM COATED ORAL at 08:23

## 2023-01-01 RX ADMIN — Medication 1 CAPSULE: at 08:40

## 2023-01-01 RX ADMIN — APIXABAN 2.5 MG: 2.5 TABLET, FILM COATED ORAL at 09:01

## 2023-01-01 RX ADMIN — Medication 1 CAPSULE: at 20:41

## 2023-01-01 RX ADMIN — Medication 1 CAPSULE: at 19:45

## 2023-01-01 RX ADMIN — APIXABAN 2.5 MG: 2.5 TABLET, FILM COATED ORAL at 22:20

## 2023-01-01 RX ADMIN — HEPARIN, PORCINE (PF) 10 UNIT/ML INTRAVENOUS SYRINGE 5 ML: at 21:41

## 2023-01-01 RX ADMIN — LIRAGLUTIDE 1.8 MG: 6 INJECTION SUBCUTANEOUS at 10:37

## 2023-01-01 RX ADMIN — INSULIN ASPART 1 UNITS: 100 INJECTION, SOLUTION INTRAVENOUS; SUBCUTANEOUS at 13:33

## 2023-01-01 RX ADMIN — ACETAMINOPHEN 325MG 975 MG: 325 TABLET ORAL at 22:16

## 2023-01-01 RX ADMIN — MELATONIN TAB 3 MG 3 MG: 3 TAB at 21:31

## 2023-01-01 RX ADMIN — Medication 1 CAPSULE: at 20:59

## 2023-01-01 RX ADMIN — CIPROFLOXACIN HYDROCHLORIDE 500 MG: 500 TABLET, FILM COATED ORAL at 20:39

## 2023-01-01 RX ADMIN — MICONAZOLE NITRATE: 20 POWDER TOPICAL at 21:47

## 2023-01-01 RX ADMIN — DICLOFENAC SODIUM 2 G: 10 GEL TOPICAL at 22:12

## 2023-01-01 RX ADMIN — Medication 50 MG: at 12:55

## 2023-01-01 RX ADMIN — SODIUM CHLORIDE, POTASSIUM CHLORIDE, SODIUM LACTATE AND CALCIUM CHLORIDE: 600; 310; 30; 20 INJECTION, SOLUTION INTRAVENOUS at 14:18

## 2023-01-01 RX ADMIN — Medication 1 CAPSULE: at 21:17

## 2023-01-01 RX ADMIN — MAGNESIUM SULFATE HEPTAHYDRATE 2 G: 40 INJECTION, SOLUTION INTRAVENOUS at 11:32

## 2023-01-01 RX ADMIN — MULTIPLE VITAMINS W/ MINERALS TAB 1 TABLET: TAB at 13:17

## 2023-01-01 RX ADMIN — CEFEPIME HYDROCHLORIDE 2 G: 2 INJECTION, POWDER, FOR SOLUTION INTRAVENOUS at 01:54

## 2023-01-01 RX ADMIN — METFORMIN ER 500 MG 2000 MG: 500 TABLET ORAL at 09:50

## 2023-01-01 RX ADMIN — Medication 1 CAPSULE: at 09:47

## 2023-01-01 RX ADMIN — SENNOSIDES AND DOCUSATE SODIUM 1 TABLET: 8.6; 5 TABLET ORAL at 09:58

## 2023-01-01 RX ADMIN — LIRAGLUTIDE 1.8 MG: 6 INJECTION SUBCUTANEOUS at 10:06

## 2023-01-01 RX ADMIN — Medication 2.5 MG: at 20:42

## 2023-01-01 RX ADMIN — ACETAMINOPHEN 1000 MG: 325 TABLET ORAL at 20:28

## 2023-01-01 RX ADMIN — CIPROFLOXACIN HYDROCHLORIDE 500 MG: 500 TABLET, FILM COATED ORAL at 09:12

## 2023-01-01 RX ADMIN — TRAMADOL HYDROCHLORIDE 50 MG: 50 TABLET, COATED ORAL at 10:35

## 2023-01-01 RX ADMIN — APIXABAN 5 MG: 5 TABLET, FILM COATED ORAL at 19:56

## 2023-01-01 RX ADMIN — APIXABAN 5 MG: 2.5 TABLET, FILM COATED ORAL at 09:50

## 2023-01-01 RX ADMIN — METFORMIN HYDROCHLORIDE 2000 MG: 500 TABLET, EXTENDED RELEASE ORAL at 09:44

## 2023-01-01 RX ADMIN — METFORMIN HYDROCHLORIDE 2000 MG: 500 TABLET, EXTENDED RELEASE ORAL at 09:13

## 2023-01-01 RX ADMIN — APIXABAN 5 MG: 5 TABLET, FILM COATED ORAL at 08:33

## 2023-01-01 RX ADMIN — ACETAMINOPHEN 325MG 975 MG: 325 TABLET ORAL at 11:46

## 2023-01-01 RX ADMIN — Medication 1 CAPSULE: at 20:18

## 2023-01-01 RX ADMIN — ACETAMINOPHEN 325MG 975 MG: 325 TABLET ORAL at 05:44

## 2023-01-01 RX ADMIN — DICLOFENAC SODIUM 2 G: 10 GEL TOPICAL at 09:25

## 2023-01-01 RX ADMIN — ACETAMINOPHEN 650 MG: 325 TABLET, FILM COATED ORAL at 12:01

## 2023-01-01 RX ADMIN — SENNOSIDES AND DOCUSATE SODIUM 1 TABLET: 50; 8.6 TABLET ORAL at 21:11

## 2023-01-01 RX ADMIN — APIXABAN 2.5 MG: 2.5 TABLET, FILM COATED ORAL at 20:47

## 2023-01-01 RX ADMIN — ATORVASTATIN CALCIUM 40 MG: 40 TABLET, FILM COATED ORAL at 08:02

## 2023-01-01 RX ADMIN — AMLODIPINE BESYLATE 10 MG: 10 TABLET ORAL at 07:58

## 2023-01-01 RX ADMIN — LIDOCAINE HYDROCHLORIDE 10 ML: 10 INJECTION, SOLUTION EPIDURAL; INFILTRATION; INTRACAUDAL; PERINEURAL at 12:46

## 2023-01-01 RX ADMIN — ATORVASTATIN CALCIUM 40 MG: 40 TABLET, FILM COATED ORAL at 10:38

## 2023-01-01 RX ADMIN — CIPROFLOXACIN HYDROCHLORIDE 500 MG: 500 TABLET, FILM COATED ORAL at 19:32

## 2023-01-01 RX ADMIN — APIXABAN 5 MG: 2.5 TABLET, FILM COATED ORAL at 20:24

## 2023-01-01 RX ADMIN — ATORVASTATIN CALCIUM 40 MG: 40 TABLET, FILM COATED ORAL at 08:33

## 2023-01-01 RX ADMIN — ATORVASTATIN CALCIUM 40 MG: 40 TABLET, FILM COATED ORAL at 13:17

## 2023-01-01 RX ADMIN — PIOGLITAZONE HYDROCHLORIDE 45 MG: 45 TABLET ORAL at 10:10

## 2023-01-01 RX ADMIN — DOXYCYCLINE HYCLATE 100 MG: 100 CAPSULE ORAL at 08:58

## 2023-01-01 RX ADMIN — PIOGLITAZONE HYDROCHLORIDE 45 MG: 45 TABLET ORAL at 10:04

## 2023-01-01 RX ADMIN — Medication 50 MCG: at 13:09

## 2023-01-01 RX ADMIN — SODIUM CHLORIDE 500 ML: 9 INJECTION, SOLUTION INTRAVENOUS at 21:33

## 2023-01-01 RX ADMIN — ATORVASTATIN CALCIUM 40 MG: 40 TABLET, FILM COATED ORAL at 11:20

## 2023-01-01 RX ADMIN — METFORMIN ER 500 MG 2000 MG: 500 TABLET ORAL at 11:26

## 2023-01-01 RX ADMIN — ACETAMINOPHEN 325MG 975 MG: 325 TABLET ORAL at 07:30

## 2023-01-01 RX ADMIN — LIRAGLUTIDE 1.8 MG: 6 INJECTION SUBCUTANEOUS at 09:25

## 2023-01-01 RX ADMIN — APIXABAN 5 MG: 2.5 TABLET, FILM COATED ORAL at 10:59

## 2023-01-01 RX ADMIN — APIXABAN 2.5 MG: 2.5 TABLET, FILM COATED ORAL at 08:59

## 2023-01-01 RX ADMIN — Medication 1 CAPSULE: at 08:19

## 2023-01-01 RX ADMIN — HEPARIN SODIUM 5000 UNITS: 5000 INJECTION, SOLUTION INTRAVENOUS; SUBCUTANEOUS at 10:37

## 2023-01-01 RX ADMIN — CARVEDILOL 6.25 MG: 6.25 TABLET, FILM COATED ORAL at 10:13

## 2023-01-01 RX ADMIN — FUROSEMIDE 40 MG: 40 TABLET ORAL at 08:39

## 2023-01-01 RX ADMIN — ACETAMINOPHEN 975 MG: 325 TABLET, FILM COATED ORAL at 05:58

## 2023-01-01 RX ADMIN — CIPROFLOXACIN HYDROCHLORIDE 500 MG: 500 TABLET, FILM COATED ORAL at 21:25

## 2023-01-01 RX ADMIN — ATORVASTATIN CALCIUM 40 MG: 40 TABLET, FILM COATED ORAL at 09:24

## 2023-01-01 RX ADMIN — POLYETHYLENE GLYCOL 3350 17 G: 17 POWDER, FOR SOLUTION ORAL at 10:22

## 2023-01-01 RX ADMIN — METFORMIN ER 500 MG 2000 MG: 500 TABLET ORAL at 09:33

## 2023-01-01 RX ADMIN — METFORMIN HYDROCHLORIDE 2000 MG: 500 TABLET, EXTENDED RELEASE ORAL at 08:19

## 2023-01-01 RX ADMIN — ACETAMINOPHEN 325MG 975 MG: 325 TABLET ORAL at 13:55

## 2023-01-01 RX ADMIN — PIOGLITAZONE HYDROCHLORIDE 45 MG: 45 TABLET ORAL at 09:55

## 2023-01-01 RX ADMIN — MULTIPLE VITAMINS W/ MINERALS TAB 1 TABLET: TAB at 11:58

## 2023-01-01 RX ADMIN — FOLIC ACID 1 MG: 1 TABLET ORAL at 08:57

## 2023-01-01 RX ADMIN — Medication 1 CAPSULE: at 08:23

## 2023-01-01 RX ADMIN — PIOGLITAZONE HYDROCHLORIDE 45 MG: 45 TABLET ORAL at 09:22

## 2023-01-01 RX ADMIN — MICONAZOLE NITRATE: 20 POWDER TOPICAL at 10:43

## 2023-01-01 RX ADMIN — LIRAGLUTIDE 1.8 MG: 6 INJECTION SUBCUTANEOUS at 09:24

## 2023-01-01 RX ADMIN — ACETAMINOPHEN 325MG 975 MG: 325 TABLET ORAL at 04:07

## 2023-01-01 RX ADMIN — MIDAZOLAM 2 MG: 1 INJECTION INTRAMUSCULAR; INTRAVENOUS at 12:47

## 2023-01-01 RX ADMIN — ACETAMINOPHEN 1000 MG: 500 TABLET ORAL at 12:09

## 2023-01-01 RX ADMIN — METHOCARBAMOL 500 MG: 500 TABLET ORAL at 18:37

## 2023-01-01 RX ADMIN — ACETAMINOPHEN 325MG 975 MG: 325 TABLET ORAL at 08:23

## 2023-01-01 RX ADMIN — SENNOSIDES AND DOCUSATE SODIUM 1 TABLET: 8.6; 5 TABLET ORAL at 20:45

## 2023-01-01 RX ADMIN — TRAMADOL HYDROCHLORIDE 50 MG: 50 TABLET, COATED ORAL at 20:27

## 2023-01-01 RX ADMIN — MELATONIN TAB 3 MG 3 MG: 3 TAB at 21:56

## 2023-01-01 RX ADMIN — HEPARIN, PORCINE (PF) 10 UNIT/ML INTRAVENOUS SYRINGE 5 ML: at 08:09

## 2023-01-01 RX ADMIN — HEPARIN, PORCINE (PF) 10 UNIT/ML INTRAVENOUS SYRINGE 5 ML: at 22:19

## 2023-01-01 RX ADMIN — LIRAGLUTIDE 1.8 MG: 6 INJECTION SUBCUTANEOUS at 10:20

## 2023-01-01 RX ADMIN — MICONAZOLE NITRATE: 20 POWDER TOPICAL at 08:40

## 2023-01-01 RX ADMIN — Medication 1 CAPSULE: at 09:17

## 2023-01-01 RX ADMIN — PIOGLITAZONE HYDROCHLORIDE 45 MG: 45 TABLET ORAL at 09:44

## 2023-01-01 RX ADMIN — MICONAZOLE NITRATE: 20 POWDER TOPICAL at 22:36

## 2023-01-01 RX ADMIN — DICLOFENAC SODIUM 2 G: 10 GEL TOPICAL at 09:45

## 2023-01-01 RX ADMIN — FERROUS SULFATE TAB 325 MG (65 MG ELEMENTAL FE) 325 MG: 325 (65 FE) TAB at 12:19

## 2023-01-01 RX ADMIN — ATORVASTATIN CALCIUM 40 MG: 40 TABLET, FILM COATED ORAL at 10:13

## 2023-01-01 RX ADMIN — METHOCARBAMOL 500 MG: 500 TABLET ORAL at 08:19

## 2023-01-01 RX ADMIN — AMLODIPINE BESYLATE 10 MG: 10 TABLET ORAL at 11:08

## 2023-01-01 RX ADMIN — ACETAMINOPHEN 650 MG: 325 TABLET, FILM COATED ORAL at 18:44

## 2023-01-01 RX ADMIN — DICLOFENAC SODIUM 2 G: 10 GEL TOPICAL at 15:51

## 2023-01-01 RX ADMIN — METHOCARBAMOL 500 MG: 500 TABLET ORAL at 22:47

## 2023-01-01 RX ADMIN — METFORMIN HYDROCHLORIDE 2000 MG: 500 TABLET, EXTENDED RELEASE ORAL at 09:23

## 2023-01-01 RX ADMIN — TRAMADOL HYDROCHLORIDE 50 MG: 50 TABLET, COATED ORAL at 20:44

## 2023-01-01 RX ADMIN — ACETAMINOPHEN 325MG 975 MG: 325 TABLET ORAL at 05:45

## 2023-01-01 RX ADMIN — APIXABAN 5 MG: 2.5 TABLET, FILM COATED ORAL at 20:18

## 2023-01-01 RX ADMIN — APIXABAN 2.5 MG: 2.5 TABLET, FILM COATED ORAL at 20:39

## 2023-01-01 RX ADMIN — CIPROFLOXACIN HYDROCHLORIDE 500 MG: 500 TABLET, FILM COATED ORAL at 09:14

## 2023-01-01 RX ADMIN — MICONAZOLE NITRATE: 20 POWDER TOPICAL at 20:29

## 2023-01-01 RX ADMIN — APIXABAN 5 MG: 2.5 TABLET, FILM COATED ORAL at 20:13

## 2023-01-01 RX ADMIN — ATORVASTATIN CALCIUM 40 MG: 40 TABLET, FILM COATED ORAL at 13:03

## 2023-01-01 RX ADMIN — CIPROFLOXACIN HYDROCHLORIDE 500 MG: 500 TABLET, FILM COATED ORAL at 20:54

## 2023-01-01 RX ADMIN — ACETAMINOPHEN 975 MG: 325 TABLET, FILM COATED ORAL at 21:09

## 2023-01-01 RX ADMIN — ACETAMINOPHEN 1000 MG: 325 TABLET ORAL at 09:12

## 2023-01-01 RX ADMIN — ACETAMINOPHEN 975 MG: 325 TABLET, FILM COATED ORAL at 22:00

## 2023-01-01 RX ADMIN — Medication 1 CAPSULE: at 08:28

## 2023-01-01 RX ADMIN — Medication 1 CAPSULE: at 21:11

## 2023-01-01 RX ADMIN — Medication 50 MG: at 13:50

## 2023-01-01 RX ADMIN — ACETAMINOPHEN 1000 MG: 500 TABLET ORAL at 10:06

## 2023-01-01 RX ADMIN — ACETAMINOPHEN 650 MG: 325 TABLET, FILM COATED ORAL at 09:12

## 2023-01-01 RX ADMIN — OXYCODONE HYDROCHLORIDE 10 MG: 10 TABLET ORAL at 19:47

## 2023-01-01 RX ADMIN — Medication 50 MG: at 06:05

## 2023-01-01 RX ADMIN — SENNOSIDES AND DOCUSATE SODIUM 1 TABLET: 50; 8.6 TABLET ORAL at 22:09

## 2023-01-01 RX ADMIN — ACETAMINOPHEN 325MG 975 MG: 325 TABLET ORAL at 04:11

## 2023-01-01 RX ADMIN — THIAMINE HCL TAB 100 MG 100 MG: 100 TAB at 14:06

## 2023-01-01 RX ADMIN — Medication 50 MCG: at 09:06

## 2023-01-01 RX ADMIN — APIXABAN 5 MG: 5 TABLET, FILM COATED ORAL at 19:39

## 2023-01-01 RX ADMIN — SENNOSIDES AND DOCUSATE SODIUM 1 TABLET: 8.6; 5 TABLET ORAL at 20:26

## 2023-01-01 RX ADMIN — APIXABAN 2.5 MG: 2.5 TABLET, FILM COATED ORAL at 20:07

## 2023-01-01 RX ADMIN — ACETAMINOPHEN 975 MG: 325 TABLET, FILM COATED ORAL at 05:45

## 2023-01-01 RX ADMIN — CIPROFLOXACIN HYDROCHLORIDE 500 MG: 500 TABLET, FILM COATED ORAL at 20:46

## 2023-01-01 RX ADMIN — APIXABAN 5 MG: 2.5 TABLET, FILM COATED ORAL at 21:50

## 2023-01-01 RX ADMIN — MICONAZOLE NITRATE: 20 POWDER TOPICAL at 20:04

## 2023-01-01 RX ADMIN — ATORVASTATIN CALCIUM 40 MG: 40 TABLET, FILM COATED ORAL at 09:41

## 2023-01-01 RX ADMIN — FERROUS SULFATE TAB 325 MG (65 MG ELEMENTAL FE) 325 MG: 325 (65 FE) TAB at 14:04

## 2023-01-01 RX ADMIN — SENNOSIDES AND DOCUSATE SODIUM 1 TABLET: 8.6; 5 TABLET ORAL at 22:00

## 2023-01-01 RX ADMIN — FERROUS SULFATE TAB 325 MG (65 MG ELEMENTAL FE) 325 MG: 325 (65 FE) TAB at 13:01

## 2023-01-01 RX ADMIN — ATORVASTATIN CALCIUM 40 MG: 40 TABLET, FILM COATED ORAL at 09:39

## 2023-01-01 RX ADMIN — Medication 5 ML: at 14:12

## 2023-01-01 RX ADMIN — THIAMINE HCL TAB 100 MG 100 MG: 100 TAB at 08:01

## 2023-01-01 RX ADMIN — FOLIC ACID 1 MG: 1 TABLET ORAL at 09:04

## 2023-01-01 RX ADMIN — Medication 1 CAPSULE: at 10:37

## 2023-01-01 RX ADMIN — FOLIC ACID 1 MG: 1 TABLET ORAL at 08:14

## 2023-01-01 RX ADMIN — Medication 25 MG: at 09:12

## 2023-01-01 RX ADMIN — Medication 10 MG: at 14:16

## 2023-01-01 RX ADMIN — METFORMIN ER 500 MG 2000 MG: 500 TABLET ORAL at 08:52

## 2023-01-01 RX ADMIN — THIAMINE HCL TAB 100 MG 100 MG: 100 TAB at 09:18

## 2023-01-01 RX ADMIN — CEFEPIME HYDROCHLORIDE 2 G: 2 INJECTION, POWDER, FOR SOLUTION INTRAVENOUS at 13:15

## 2023-01-01 RX ADMIN — SODIUM CHLORIDE, POTASSIUM CHLORIDE, SODIUM LACTATE AND CALCIUM CHLORIDE: 600; 310; 30; 20 INJECTION, SOLUTION INTRAVENOUS at 16:16

## 2023-01-01 RX ADMIN — ACETAMINOPHEN 1000 MG: 500 TABLET ORAL at 10:02

## 2023-01-01 RX ADMIN — Medication 1 CAPSULE: at 09:45

## 2023-01-01 RX ADMIN — SENNOSIDES AND DOCUSATE SODIUM 1 TABLET: 8.6; 5 TABLET ORAL at 20:33

## 2023-01-01 RX ADMIN — APIXABAN 5 MG: 2.5 TABLET, FILM COATED ORAL at 21:04

## 2023-01-01 RX ADMIN — APIXABAN 5 MG: 2.5 TABLET, FILM COATED ORAL at 08:53

## 2023-01-01 RX ADMIN — ACETAMINOPHEN 325MG 975 MG: 325 TABLET ORAL at 10:38

## 2023-01-01 RX ADMIN — APIXABAN 5 MG: 2.5 TABLET, FILM COATED ORAL at 10:06

## 2023-01-01 RX ADMIN — ATORVASTATIN CALCIUM 40 MG: 40 TABLET, FILM COATED ORAL at 10:07

## 2023-01-01 RX ADMIN — Medication 1 CAPSULE: at 20:36

## 2023-01-01 RX ADMIN — OXYCODONE HYDROCHLORIDE 10 MG: 10 TABLET ORAL at 14:07

## 2023-01-01 RX ADMIN — FOLIC ACID 1 MG: 1 TABLET ORAL at 09:16

## 2023-01-01 RX ADMIN — CARVEDILOL 6.25 MG: 6.25 TABLET, FILM COATED ORAL at 09:14

## 2023-01-01 RX ADMIN — Medication 1 CAPSULE: at 09:16

## 2023-01-01 RX ADMIN — ACETAMINOPHEN 325MG 975 MG: 325 TABLET ORAL at 20:00

## 2023-01-01 RX ADMIN — ATORVASTATIN CALCIUM 40 MG: 40 TABLET, FILM COATED ORAL at 13:52

## 2023-01-01 RX ADMIN — APIXABAN 2.5 MG: 2.5 TABLET, FILM COATED ORAL at 20:06

## 2023-01-01 RX ADMIN — DOXYCYCLINE 100 MG: 100 CAPSULE ORAL at 18:45

## 2023-01-01 RX ADMIN — Medication 5 ML: at 14:08

## 2023-01-01 RX ADMIN — ACETAMINOPHEN 1000 MG: 500 TABLET ORAL at 21:05

## 2023-01-01 RX ADMIN — ATORVASTATIN CALCIUM 40 MG: 40 TABLET, FILM COATED ORAL at 08:41

## 2023-01-01 RX ADMIN — FOLIC ACID 1 MG: 1 TABLET ORAL at 09:08

## 2023-01-01 RX ADMIN — ATORVASTATIN CALCIUM 40 MG: 40 TABLET, FILM COATED ORAL at 09:03

## 2023-01-01 RX ADMIN — APIXABAN 5 MG: 2.5 TABLET, FILM COATED ORAL at 13:09

## 2023-01-01 RX ADMIN — APIXABAN 2.5 MG: 2.5 TABLET, FILM COATED ORAL at 21:07

## 2023-01-01 RX ADMIN — APIXABAN 2.5 MG: 2.5 TABLET, FILM COATED ORAL at 21:36

## 2023-01-01 RX ADMIN — PIOGLITAZONE HYDROCHLORIDE 45 MG: 45 TABLET ORAL at 09:40

## 2023-01-01 RX ADMIN — Medication 1 CAPSULE: at 09:29

## 2023-01-01 RX ADMIN — ATORVASTATIN CALCIUM 40 MG: 40 TABLET, FILM COATED ORAL at 09:50

## 2023-01-01 RX ADMIN — Medication 25 MG: at 10:02

## 2023-01-01 RX ADMIN — PIOGLITAZONE HYDROCHLORIDE 45 MG: 45 TABLET ORAL at 08:32

## 2023-01-01 RX ADMIN — Medication 1 CAPSULE: at 08:42

## 2023-01-01 RX ADMIN — LIRAGLUTIDE 1.8 MG: 6 INJECTION SUBCUTANEOUS at 09:28

## 2023-01-01 RX ADMIN — Medication 5 ML: at 15:28

## 2023-01-01 RX ADMIN — APIXABAN 5 MG: 2.5 TABLET, FILM COATED ORAL at 21:31

## 2023-01-01 RX ADMIN — MICONAZOLE NITRATE: 20 POWDER TOPICAL at 21:38

## 2023-01-01 RX ADMIN — POLYETHYLENE GLYCOL 3350 17 G: 17 POWDER, FOR SOLUTION ORAL at 13:11

## 2023-01-01 RX ADMIN — OXYCODONE HYDROCHLORIDE 5 MG: 5 TABLET ORAL at 05:45

## 2023-01-01 RX ADMIN — METHOCARBAMOL 500 MG: 500 TABLET ORAL at 08:07

## 2023-01-01 RX ADMIN — LIRAGLUTIDE 1.8 MG: 6 INJECTION SUBCUTANEOUS at 09:56

## 2023-01-01 RX ADMIN — ATORVASTATIN CALCIUM 40 MG: 40 TABLET, FILM COATED ORAL at 08:51

## 2023-01-01 RX ADMIN — CARVEDILOL 6.25 MG: 6.25 TABLET, FILM COATED ORAL at 18:22

## 2023-01-01 RX ADMIN — CARVEDILOL 6.25 MG: 6.25 TABLET, FILM COATED ORAL at 17:47

## 2023-01-01 RX ADMIN — ACETAMINOPHEN 1000 MG: 325 TABLET ORAL at 18:21

## 2023-01-01 RX ADMIN — ATORVASTATIN CALCIUM 40 MG: 40 TABLET, FILM COATED ORAL at 10:52

## 2023-01-01 RX ADMIN — Medication 1 CAPSULE: at 19:32

## 2023-01-01 RX ADMIN — MICONAZOLE NITRATE: 20 POWDER TOPICAL at 10:15

## 2023-01-01 RX ADMIN — PIOGLITAZONE HYDROCHLORIDE 45 MG: 45 TABLET ORAL at 09:36

## 2023-01-01 RX ADMIN — TRAMADOL HYDROCHLORIDE 50 MG: 50 TABLET, COATED ORAL at 04:11

## 2023-01-01 RX ADMIN — DICLOFENAC SODIUM 2 G: 10 GEL TOPICAL at 21:01

## 2023-01-01 RX ADMIN — DOCUSATE SODIUM 100 MG: 100 CAPSULE, LIQUID FILLED ORAL at 22:00

## 2023-01-01 RX ADMIN — CIPROFLOXACIN HYDROCHLORIDE 500 MG: 500 TABLET, FILM COATED ORAL at 10:06

## 2023-01-01 RX ADMIN — TRANEXAMIC ACID 1950 MG: 650 TABLET ORAL at 11:32

## 2023-01-01 RX ADMIN — APIXABAN 5 MG: 5 TABLET, FILM COATED ORAL at 08:13

## 2023-01-01 RX ADMIN — ACETAMINOPHEN 325MG 975 MG: 325 TABLET ORAL at 08:41

## 2023-01-01 RX ADMIN — LIRAGLUTIDE 1.8 MG: 6 INJECTION SUBCUTANEOUS at 09:37

## 2023-01-01 RX ADMIN — ACETAMINOPHEN 650 MG: 325 TABLET, FILM COATED ORAL at 11:39

## 2023-01-01 RX ADMIN — Medication 1 CAPSULE: at 11:32

## 2023-01-01 RX ADMIN — LIRAGLUTIDE 1.8 MG: 6 INJECTION SUBCUTANEOUS at 09:39

## 2023-01-01 RX ADMIN — APIXABAN 5 MG: 2.5 TABLET, FILM COATED ORAL at 21:01

## 2023-01-01 RX ADMIN — LIRAGLUTIDE 1.8 MG: 6 INJECTION SUBCUTANEOUS at 09:22

## 2023-01-01 RX ADMIN — ATORVASTATIN CALCIUM 40 MG: 40 TABLET, FILM COATED ORAL at 10:54

## 2023-01-01 RX ADMIN — OXYCODONE HYDROCHLORIDE 10 MG: 5 TABLET ORAL at 22:55

## 2023-01-01 RX ADMIN — METHOCARBAMOL 500 MG: 500 TABLET ORAL at 21:31

## 2023-01-01 RX ADMIN — ATORVASTATIN CALCIUM 40 MG: 40 TABLET, FILM COATED ORAL at 09:30

## 2023-01-01 RX ADMIN — ATORVASTATIN CALCIUM 40 MG: 40 TABLET, FILM COATED ORAL at 10:02

## 2023-01-01 RX ADMIN — ACETAMINOPHEN 1000 MG: 325 TABLET ORAL at 09:20

## 2023-01-01 RX ADMIN — APIXABAN 5 MG: 2.5 TABLET, FILM COATED ORAL at 09:32

## 2023-01-01 RX ADMIN — APIXABAN 5 MG: 2.5 TABLET, FILM COATED ORAL at 08:51

## 2023-01-01 RX ADMIN — FOLIC ACID 1 MG: 1 TABLET ORAL at 12:16

## 2023-01-01 RX ADMIN — APIXABAN 5 MG: 2.5 TABLET, FILM COATED ORAL at 20:03

## 2023-01-01 RX ADMIN — ATORVASTATIN CALCIUM 40 MG: 40 TABLET, FILM COATED ORAL at 09:59

## 2023-01-01 RX ADMIN — ATORVASTATIN CALCIUM 40 MG: 40 TABLET, FILM COATED ORAL at 10:21

## 2023-01-01 RX ADMIN — Medication 1 CAPSULE: at 09:12

## 2023-01-01 RX ADMIN — ATORVASTATIN CALCIUM 40 MG: 40 TABLET, FILM COATED ORAL at 09:08

## 2023-01-01 RX ADMIN — ATORVASTATIN CALCIUM 40 MG: 40 TABLET, FILM COATED ORAL at 09:07

## 2023-01-01 RX ADMIN — ACETAMINOPHEN 325MG 975 MG: 325 TABLET ORAL at 21:07

## 2023-01-01 RX ADMIN — METFORMIN HYDROCHLORIDE 2000 MG: 500 TABLET, EXTENDED RELEASE ORAL at 10:10

## 2023-01-01 RX ADMIN — FENTANYL CITRATE 50 MCG: 50 INJECTION, SOLUTION INTRAMUSCULAR; INTRAVENOUS at 14:38

## 2023-01-01 RX ADMIN — AMLODIPINE BESYLATE 10 MG: 10 TABLET ORAL at 11:37

## 2023-01-01 RX ADMIN — DOXYCYCLINE 100 MG: 100 CAPSULE ORAL at 11:19

## 2023-01-01 RX ADMIN — MULTIPLE VITAMINS W/ MINERALS TAB 1 TABLET: TAB at 12:15

## 2023-01-01 RX ADMIN — SODIUM CHLORIDE, POTASSIUM CHLORIDE, SODIUM LACTATE AND CALCIUM CHLORIDE 500 ML: 600; 310; 30; 20 INJECTION, SOLUTION INTRAVENOUS at 14:32

## 2023-01-01 RX ADMIN — METFORMIN HYDROCHLORIDE 2000 MG: 500 TABLET, EXTENDED RELEASE ORAL at 10:04

## 2023-01-01 RX ADMIN — SODIUM CHLORIDE 1000 ML: 9 INJECTION, SOLUTION INTRAVENOUS at 15:37

## 2023-01-01 RX ADMIN — Medication 1 CAPSULE: at 21:07

## 2023-01-01 RX ADMIN — METHOCARBAMOL 500 MG: 500 TABLET ORAL at 20:46

## 2023-01-01 RX ADMIN — Medication 1 CAPSULE: at 10:31

## 2023-01-01 RX ADMIN — THIAMINE HCL TAB 100 MG 100 MG: 100 TAB at 13:50

## 2023-01-01 RX ADMIN — Medication 50 MCG: at 08:39

## 2023-01-01 RX ADMIN — MICONAZOLE NITRATE: 20 POWDER TOPICAL at 10:08

## 2023-01-01 RX ADMIN — DICLOFENAC SODIUM 2 G: 10 GEL TOPICAL at 20:13

## 2023-01-01 RX ADMIN — APIXABAN 5 MG: 2.5 TABLET, FILM COATED ORAL at 21:40

## 2023-01-01 RX ADMIN — Medication 5 ML: at 15:25

## 2023-01-01 RX ADMIN — APIXABAN 5 MG: 2.5 TABLET, FILM COATED ORAL at 21:28

## 2023-01-01 RX ADMIN — DOXYCYCLINE HYCLATE 100 MG: 100 CAPSULE ORAL at 19:32

## 2023-01-01 RX ADMIN — Medication 25 MG: at 12:14

## 2023-01-01 RX ADMIN — PIOGLITAZONE HYDROCHLORIDE 45 MG: 45 TABLET ORAL at 08:46

## 2023-01-01 RX ADMIN — MICONAZOLE NITRATE: 20 POWDER TOPICAL at 20:44

## 2023-01-01 RX ADMIN — MICONAZOLE NITRATE: 20 POWDER TOPICAL at 09:26

## 2023-01-01 RX ADMIN — APIXABAN 5 MG: 2.5 TABLET, FILM COATED ORAL at 09:41

## 2023-01-01 RX ADMIN — CEFEPIME HYDROCHLORIDE 2 G: 2 INJECTION, POWDER, FOR SOLUTION INTRAVENOUS at 00:46

## 2023-01-01 RX ADMIN — Medication 50 MG: at 09:21

## 2023-01-01 RX ADMIN — MAGNESIUM SULFATE HEPTAHYDRATE 4 G: 40 INJECTION, SOLUTION INTRAVENOUS at 15:02

## 2023-01-01 RX ADMIN — FOLIC ACID 1 MG: 1 TABLET ORAL at 08:06

## 2023-01-01 RX ADMIN — ATORVASTATIN CALCIUM 40 MG: 40 TABLET, FILM COATED ORAL at 08:48

## 2023-01-01 RX ADMIN — ACETAMINOPHEN 1000 MG: 500 TABLET ORAL at 16:06

## 2023-01-01 RX ADMIN — MELATONIN TAB 3 MG 3 MG: 3 TAB at 21:28

## 2023-01-01 RX ADMIN — METFORMIN ER 500 MG 2000 MG: 500 TABLET ORAL at 09:54

## 2023-01-01 RX ADMIN — Medication 1 CAPSULE: at 21:09

## 2023-01-01 RX ADMIN — METHOCARBAMOL 500 MG: 500 TABLET ORAL at 09:17

## 2023-01-01 RX ADMIN — CARVEDILOL 6.25 MG: 3.12 TABLET, FILM COATED ORAL at 18:24

## 2023-01-01 RX ADMIN — HEPARIN SODIUM 5000 UNITS: 5000 INJECTION, SOLUTION INTRAVENOUS; SUBCUTANEOUS at 19:45

## 2023-01-01 RX ADMIN — Medication 2.5 MG: at 23:13

## 2023-01-01 RX ADMIN — Medication 1 CAPSULE: at 20:10

## 2023-01-01 RX ADMIN — FOLIC ACID 1 MG: 1 TABLET ORAL at 08:49

## 2023-01-01 RX ADMIN — TRAMADOL HYDROCHLORIDE 50 MG: 50 TABLET, COATED ORAL at 19:28

## 2023-01-01 RX ADMIN — FENTANYL CITRATE 50 MCG: 50 INJECTION, SOLUTION INTRAMUSCULAR; INTRAVENOUS at 10:53

## 2023-01-01 RX ADMIN — Medication 25 MG: at 22:11

## 2023-01-01 RX ADMIN — POLYETHYLENE GLYCOL 3350 17 G: 17 POWDER, FOR SOLUTION ORAL at 08:53

## 2023-01-01 RX ADMIN — Medication 50 MCG: at 10:18

## 2023-01-01 RX ADMIN — OXYCODONE HYDROCHLORIDE 10 MG: 5 TABLET ORAL at 08:19

## 2023-01-01 RX ADMIN — MULTIPLE VITAMINS W/ MINERALS TAB 1 TABLET: TAB at 11:25

## 2023-01-01 RX ADMIN — CARVEDILOL 6.25 MG: 3.12 TABLET, FILM COATED ORAL at 20:01

## 2023-01-01 RX ADMIN — MICONAZOLE NITRATE: 20 POWDER TOPICAL at 21:26

## 2023-01-01 RX ADMIN — Medication 50 MG: at 04:10

## 2023-01-01 RX ADMIN — CARVEDILOL 6.25 MG: 3.12 TABLET, FILM COATED ORAL at 09:18

## 2023-01-01 RX ADMIN — OXYCODONE HYDROCHLORIDE 5 MG: 5 TABLET ORAL at 21:04

## 2023-01-01 RX ADMIN — APIXABAN 5 MG: 2.5 TABLET, FILM COATED ORAL at 09:06

## 2023-01-01 RX ADMIN — ACETAMINOPHEN 975 MG: 325 TABLET, FILM COATED ORAL at 05:57

## 2023-01-01 RX ADMIN — CIPROFLOXACIN HYDROCHLORIDE 500 MG: 500 TABLET, FILM COATED ORAL at 05:13

## 2023-01-01 RX ADMIN — SENNOSIDES AND DOCUSATE SODIUM 1 TABLET: 50; 8.6 TABLET ORAL at 08:59

## 2023-01-01 RX ADMIN — TRAMADOL HYDROCHLORIDE 50 MG: 50 TABLET, COATED ORAL at 20:06

## 2023-01-01 RX ADMIN — Medication 25 MG: at 14:36

## 2023-01-01 RX ADMIN — MICONAZOLE NITRATE: 20 POWDER TOPICAL at 11:49

## 2023-01-01 RX ADMIN — Medication 50 MG: at 14:26

## 2023-01-01 RX ADMIN — ATORVASTATIN CALCIUM 40 MG: 40 TABLET, FILM COATED ORAL at 14:06

## 2023-01-01 RX ADMIN — PIOGLITAZONE HYDROCHLORIDE 45 MG: 45 TABLET ORAL at 10:08

## 2023-01-01 RX ADMIN — CARVEDILOL 6.25 MG: 6.25 TABLET, FILM COATED ORAL at 17:08

## 2023-01-01 RX ADMIN — CARVEDILOL 6.25 MG: 6.25 TABLET, FILM COATED ORAL at 11:32

## 2023-01-01 RX ADMIN — Medication 1 CAPSULE: at 20:23

## 2023-01-01 RX ADMIN — LIRAGLUTIDE 1.8 MG: 6 INJECTION SUBCUTANEOUS at 10:12

## 2023-01-01 RX ADMIN — LIRAGLUTIDE 1.8 MG: 6 INJECTION SUBCUTANEOUS at 12:43

## 2023-01-01 RX ADMIN — LIRAGLUTIDE 1.8 MG: 6 INJECTION SUBCUTANEOUS at 10:00

## 2023-01-01 RX ADMIN — Medication 1 CAPSULE: at 10:10

## 2023-01-01 RX ADMIN — Medication 50 MG: at 10:02

## 2023-01-01 RX ADMIN — METFORMIN ER 500 MG 2000 MG: 500 TABLET ORAL at 10:59

## 2023-01-01 RX ADMIN — THIAMINE HCL TAB 100 MG 100 MG: 100 TAB at 14:37

## 2023-01-01 RX ADMIN — MULTIPLE VITAMINS W/ MINERALS TAB 1 TABLET: TAB at 14:06

## 2023-01-01 RX ADMIN — SODIUM CHLORIDE, POTASSIUM CHLORIDE, SODIUM LACTATE AND CALCIUM CHLORIDE 500 ML: 600; 310; 30; 20 INJECTION, SOLUTION INTRAVENOUS at 12:16

## 2023-01-01 RX ADMIN — Medication 50 MCG: at 08:36

## 2023-01-01 RX ADMIN — ACETAMINOPHEN 975 MG: 325 TABLET, FILM COATED ORAL at 13:08

## 2023-01-01 RX ADMIN — METHOCARBAMOL 500 MG: 500 TABLET ORAL at 15:25

## 2023-01-01 RX ADMIN — Medication 3 MG: at 21:21

## 2023-01-01 RX ADMIN — AMLODIPINE BESYLATE 10 MG: 10 TABLET ORAL at 09:08

## 2023-01-01 RX ADMIN — TRAMADOL HYDROCHLORIDE 50 MG: 50 TABLET, COATED ORAL at 05:52

## 2023-01-01 RX ADMIN — Medication 25 MG: at 10:39

## 2023-01-01 RX ADMIN — ACETAMINOPHEN 650 MG: 325 TABLET, FILM COATED ORAL at 14:31

## 2023-01-01 RX ADMIN — Medication 1 CAPSULE: at 09:06

## 2023-01-01 RX ADMIN — APIXABAN 5 MG: 2.5 TABLET, FILM COATED ORAL at 20:45

## 2023-01-01 RX ADMIN — CIPROFLOXACIN HYDROCHLORIDE 500 MG: 500 TABLET, FILM COATED ORAL at 21:02

## 2023-01-01 RX ADMIN — CIPROFLOXACIN HYDROCHLORIDE 500 MG: 500 TABLET, FILM COATED ORAL at 09:00

## 2023-01-01 RX ADMIN — APIXABAN 5 MG: 2.5 TABLET, FILM COATED ORAL at 21:02

## 2023-01-01 RX ADMIN — TRAMADOL HYDROCHLORIDE 50 MG: 50 TABLET, COATED ORAL at 17:15

## 2023-01-01 RX ADMIN — SENNOSIDES AND DOCUSATE SODIUM 1 TABLET: 50; 8.6 TABLET ORAL at 10:18

## 2023-01-01 RX ADMIN — Medication 15 ML: at 16:26

## 2023-01-01 RX ADMIN — APIXABAN 5 MG: 5 TABLET, FILM COATED ORAL at 20:40

## 2023-01-01 RX ADMIN — MIDAZOLAM 0.5 MG: 1 INJECTION INTRAMUSCULAR; INTRAVENOUS at 10:07

## 2023-01-01 RX ADMIN — AMLODIPINE BESYLATE 10 MG: 10 TABLET ORAL at 13:03

## 2023-01-01 RX ADMIN — Medication 25 MG: at 15:40

## 2023-01-01 RX ADMIN — SODIUM CHLORIDE, POTASSIUM CHLORIDE, SODIUM LACTATE AND CALCIUM CHLORIDE 1000 ML: 600; 310; 30; 20 INJECTION, SOLUTION INTRAVENOUS at 12:37

## 2023-01-01 RX ADMIN — MICONAZOLE NITRATE: 20 POWDER TOPICAL at 20:41

## 2023-01-01 RX ADMIN — CIPROFLOXACIN HYDROCHLORIDE 500 MG: 500 TABLET, FILM COATED ORAL at 20:36

## 2023-01-01 RX ADMIN — PROPOFOL 150 MG: 10 INJECTION, EMULSION INTRAVENOUS at 14:24

## 2023-01-01 RX ADMIN — CARVEDILOL 6.25 MG: 6.25 TABLET, FILM COATED ORAL at 09:10

## 2023-01-01 RX ADMIN — CARVEDILOL 6.25 MG: 6.25 TABLET, FILM COATED ORAL at 17:37

## 2023-01-01 RX ADMIN — TRAMADOL HYDROCHLORIDE 50 MG: 50 TABLET, COATED ORAL at 16:29

## 2023-01-01 RX ADMIN — Medication 2.5 MG: at 08:55

## 2023-01-01 RX ADMIN — Medication 1 CAPSULE: at 20:12

## 2023-01-01 RX ADMIN — Medication 50 MCG: at 13:49

## 2023-01-01 RX ADMIN — MULTIPLE VITAMINS W/ MINERALS TAB 1 TABLET: TAB at 12:29

## 2023-01-01 RX ADMIN — TRAMADOL HYDROCHLORIDE 50 MG: 50 TABLET, COATED ORAL at 20:47

## 2023-01-01 RX ADMIN — ACETAMINOPHEN 1000 MG: 325 TABLET ORAL at 22:31

## 2023-01-01 RX ADMIN — METHOCARBAMOL 500 MG: 500 TABLET ORAL at 10:45

## 2023-01-01 RX ADMIN — HEPARIN, PORCINE (PF) 10 UNIT/ML INTRAVENOUS SYRINGE 5 ML: at 12:51

## 2023-01-01 RX ADMIN — HEPARIN, PORCINE (PF) 10 UNIT/ML INTRAVENOUS SYRINGE 5 ML: at 11:18

## 2023-01-01 RX ADMIN — FOLIC ACID 1 MG: 1 TABLET ORAL at 08:20

## 2023-01-01 RX ADMIN — METHOCARBAMOL 500 MG: 500 TABLET ORAL at 10:02

## 2023-01-01 RX ADMIN — Medication 25 MG: at 09:58

## 2023-01-01 RX ADMIN — MULTIPLE VITAMINS W/ MINERALS TAB 1 TABLET: TAB at 12:14

## 2023-01-01 RX ADMIN — ACETAMINOPHEN 1000 MG: 325 TABLET ORAL at 14:10

## 2023-01-01 RX ADMIN — MULTIPLE VITAMINS W/ MINERALS TAB 1 TABLET: TAB at 14:31

## 2023-01-01 RX ADMIN — APIXABAN 5 MG: 5 TABLET, FILM COATED ORAL at 09:33

## 2023-01-01 RX ADMIN — METHOCARBAMOL 500 MG: 500 TABLET ORAL at 08:58

## 2023-01-01 RX ADMIN — POLYETHYLENE GLYCOL 3350 17 G: 17 POWDER, FOR SOLUTION ORAL at 21:49

## 2023-01-01 RX ADMIN — SENNOSIDES AND DOCUSATE SODIUM 1 TABLET: 8.6; 5 TABLET ORAL at 20:36

## 2023-01-01 RX ADMIN — Medication 1 CAPSULE: at 09:58

## 2023-01-01 RX ADMIN — Medication 50 MG: at 21:10

## 2023-01-01 RX ADMIN — Medication 1 CAPSULE: at 08:32

## 2023-01-01 RX ADMIN — ATORVASTATIN CALCIUM 40 MG: 40 TABLET, FILM COATED ORAL at 13:49

## 2023-01-01 RX ADMIN — ACETAMINOPHEN 975 MG: 325 TABLET, FILM COATED ORAL at 22:34

## 2023-01-01 RX ADMIN — CEFTRIAXONE SODIUM 2 G: 2 INJECTION, POWDER, FOR SOLUTION INTRAMUSCULAR; INTRAVENOUS at 18:44

## 2023-01-01 RX ADMIN — CIPROFLOXACIN HYDROCHLORIDE 500 MG: 500 TABLET, FILM COATED ORAL at 09:18

## 2023-01-01 RX ADMIN — DOXYCYCLINE HYCLATE 100 MG: 100 CAPSULE ORAL at 19:58

## 2023-01-01 RX ADMIN — Medication 1 CAPSULE: at 09:50

## 2023-01-01 RX ADMIN — PIOGLITAZONE HYDROCHLORIDE 45 MG: 45 TABLET ORAL at 09:29

## 2023-01-01 RX ADMIN — MICONAZOLE NITRATE: 20 POWDER TOPICAL at 08:47

## 2023-01-01 RX ADMIN — Medication 25 MG: at 06:22

## 2023-01-01 RX ADMIN — APIXABAN 5 MG: 2.5 TABLET, FILM COATED ORAL at 10:33

## 2023-01-01 RX ADMIN — INSULIN ASPART 1 UNITS: 100 INJECTION, SOLUTION INTRAVENOUS; SUBCUTANEOUS at 12:04

## 2023-01-01 RX ADMIN — OXYCODONE HYDROCHLORIDE 5 MG: 5 TABLET ORAL at 14:59

## 2023-01-01 RX ADMIN — METHOCARBAMOL 500 MG: 500 TABLET ORAL at 09:55

## 2023-01-01 RX ADMIN — CIPROFLOXACIN HYDROCHLORIDE 500 MG: 500 TABLET, FILM COATED ORAL at 21:47

## 2023-01-01 RX ADMIN — APIXABAN 5 MG: 2.5 TABLET, FILM COATED ORAL at 10:38

## 2023-01-01 RX ADMIN — HEPARIN, PORCINE (PF) 10 UNIT/ML INTRAVENOUS SYRINGE 5 ML: at 11:19

## 2023-01-01 RX ADMIN — SENNOSIDES AND DOCUSATE SODIUM 1 TABLET: 50; 8.6 TABLET ORAL at 20:56

## 2023-01-01 RX ADMIN — ACETAMINOPHEN 325MG 975 MG: 325 TABLET ORAL at 04:14

## 2023-01-01 RX ADMIN — SODIUM CHLORIDE 1000 ML: 4.5 INJECTION, SOLUTION INTRAVENOUS at 21:30

## 2023-01-01 RX ADMIN — CIPROFLOXACIN HYDROCHLORIDE 500 MG: 500 TABLET, FILM COATED ORAL at 08:06

## 2023-01-01 RX ADMIN — OXYCODONE HYDROCHLORIDE 10 MG: 10 TABLET ORAL at 02:11

## 2023-01-01 RX ADMIN — Medication 1 CAPSULE: at 22:17

## 2023-01-01 RX ADMIN — ATORVASTATIN CALCIUM 40 MG: 40 TABLET, FILM COATED ORAL at 11:11

## 2023-01-01 RX ADMIN — METHOCARBAMOL 500 MG: 500 TABLET ORAL at 16:03

## 2023-01-01 RX ADMIN — ACETAMINOPHEN 975 MG: 325 TABLET, FILM COATED ORAL at 05:25

## 2023-01-01 RX ADMIN — APIXABAN 5 MG: 2.5 TABLET, FILM COATED ORAL at 11:31

## 2023-01-01 RX ADMIN — VANCOMYCIN HYDROCHLORIDE 1500 MG: 10 INJECTION, POWDER, LYOPHILIZED, FOR SOLUTION INTRAVENOUS at 09:49

## 2023-01-01 RX ADMIN — Medication 1 CAPSULE: at 21:06

## 2023-01-01 RX ADMIN — ACETAMINOPHEN 975 MG: 325 TABLET, FILM COATED ORAL at 05:27

## 2023-01-01 RX ADMIN — Medication 1 CAPSULE: at 09:14

## 2023-01-01 RX ADMIN — FUROSEMIDE 20 MG: 20 TABLET ORAL at 10:31

## 2023-01-01 RX ADMIN — MICONAZOLE NITRATE: 20 POWDER TOPICAL at 08:50

## 2023-01-01 RX ADMIN — Medication 5 ML: at 15:08

## 2023-01-01 RX ADMIN — APIXABAN 5 MG: 2.5 TABLET, FILM COATED ORAL at 11:19

## 2023-01-01 RX ADMIN — ATORVASTATIN CALCIUM 40 MG: 40 TABLET, FILM COATED ORAL at 08:40

## 2023-01-01 RX ADMIN — APIXABAN 5 MG: 2.5 TABLET, FILM COATED ORAL at 09:04

## 2023-01-01 RX ADMIN — Medication 1 CAPSULE: at 20:25

## 2023-01-01 RX ADMIN — ATORVASTATIN CALCIUM 40 MG: 40 TABLET, FILM COATED ORAL at 09:18

## 2023-01-01 RX ADMIN — APIXABAN 2.5 MG: 2.5 TABLET, FILM COATED ORAL at 08:55

## 2023-01-01 RX ADMIN — Medication 50 MCG: at 13:28

## 2023-01-01 RX ADMIN — OXYCODONE HYDROCHLORIDE 10 MG: 5 TABLET ORAL at 20:34

## 2023-01-01 RX ADMIN — MULTIPLE VITAMINS W/ MINERALS TAB 1 TABLET: TAB at 11:13

## 2023-01-01 RX ADMIN — Medication 1 CAPSULE: at 22:09

## 2023-01-01 RX ADMIN — METHOCARBAMOL 500 MG: 500 TABLET ORAL at 22:11

## 2023-01-01 RX ADMIN — Medication 1 CAPSULE: at 20:01

## 2023-01-01 RX ADMIN — MICONAZOLE NITRATE: 20 POWDER TOPICAL at 21:12

## 2023-01-01 RX ADMIN — Medication 1 CAPSULE: at 09:35

## 2023-01-01 RX ADMIN — Medication 50 MCG: at 13:54

## 2023-01-01 RX ADMIN — APIXABAN 5 MG: 2.5 TABLET, FILM COATED ORAL at 09:54

## 2023-01-01 RX ADMIN — Medication 5 ML: at 15:11

## 2023-01-01 RX ADMIN — THIAMINE HCL TAB 100 MG 100 MG: 100 TAB at 13:01

## 2023-01-01 RX ADMIN — METHOCARBAMOL 500 MG: 500 TABLET ORAL at 20:20

## 2023-01-01 RX ADMIN — THIAMINE HCL TAB 100 MG 100 MG: 100 TAB at 11:07

## 2023-01-01 RX ADMIN — Medication 1 CAPSULE: at 09:32

## 2023-01-01 RX ADMIN — DOXYCYCLINE HYCLATE 100 MG: 100 CAPSULE ORAL at 20:36

## 2023-01-01 RX ADMIN — METFORMIN HYDROCHLORIDE 2000 MG: 500 TABLET, EXTENDED RELEASE ORAL at 09:53

## 2023-01-01 RX ADMIN — Medication 1 CAPSULE: at 21:56

## 2023-01-01 RX ADMIN — METHOCARBAMOL 500 MG: 500 TABLET ORAL at 16:55

## 2023-01-01 RX ADMIN — Medication 1 CAPSULE: at 20:15

## 2023-01-01 RX ADMIN — CEFTAZIDIME 2 G: 2 INJECTION, POWDER, FOR SOLUTION INTRAVENOUS at 21:06

## 2023-01-01 RX ADMIN — ACETAMINOPHEN 975 MG: 325 TABLET, FILM COATED ORAL at 07:10

## 2023-01-01 RX ADMIN — LIRAGLUTIDE 1.8 MG: 6 INJECTION SUBCUTANEOUS at 09:32

## 2023-01-01 RX ADMIN — Medication 2 G: at 22:17

## 2023-01-01 RX ADMIN — ACETAMINOPHEN 650 MG: 325 TABLET, FILM COATED ORAL at 22:57

## 2023-01-01 RX ADMIN — APIXABAN 5 MG: 2.5 TABLET, FILM COATED ORAL at 10:10

## 2023-01-01 RX ADMIN — DICLOFENAC SODIUM 2 G: 10 GEL TOPICAL at 08:58

## 2023-01-01 RX ADMIN — Medication 25 MG: at 17:10

## 2023-01-01 RX ADMIN — METFORMIN ER 500 MG 2000 MG: 500 TABLET ORAL at 09:24

## 2023-01-01 RX ADMIN — ATORVASTATIN CALCIUM 40 MG: 40 TABLET, FILM COATED ORAL at 12:16

## 2023-01-01 RX ADMIN — MICONAZOLE NITRATE: 20 POWDER TOPICAL at 20:16

## 2023-01-01 RX ADMIN — METHOCARBAMOL 500 MG: 500 TABLET ORAL at 18:22

## 2023-01-01 RX ADMIN — ACETAMINOPHEN 1000 MG: 325 TABLET ORAL at 06:23

## 2023-01-01 RX ADMIN — APIXABAN 2.5 MG: 2.5 TABLET, FILM COATED ORAL at 08:57

## 2023-01-01 RX ADMIN — ACETAMINOPHEN 975 MG: 325 TABLET, FILM COATED ORAL at 05:42

## 2023-01-01 RX ADMIN — OXYCODONE HYDROCHLORIDE 10 MG: 5 TABLET ORAL at 18:32

## 2023-01-01 RX ADMIN — ATORVASTATIN CALCIUM 40 MG: 40 TABLET, FILM COATED ORAL at 09:42

## 2023-01-01 RX ADMIN — Medication 1 CAPSULE: at 20:33

## 2023-01-01 RX ADMIN — LIRAGLUTIDE 1.8 MG: 6 INJECTION SUBCUTANEOUS at 10:04

## 2023-01-01 RX ADMIN — METRONIDAZOLE 500 MG: 500 TABLET ORAL at 10:13

## 2023-01-01 RX ADMIN — ACETAMINOPHEN 1000 MG: 325 TABLET ORAL at 16:51

## 2023-01-01 RX ADMIN — DOXYCYCLINE HYCLATE 100 MG: 100 CAPSULE ORAL at 09:08

## 2023-01-01 RX ADMIN — LIRAGLUTIDE 1.8 MG: 6 INJECTION SUBCUTANEOUS at 09:09

## 2023-01-01 RX ADMIN — LIDOCAINE PATCH 4% 1 PATCH: 40 PATCH TOPICAL at 08:22

## 2023-01-01 RX ADMIN — Medication 5 ML: at 14:33

## 2023-01-01 RX ADMIN — Medication 25 MG: at 09:54

## 2023-01-01 RX ADMIN — ATORVASTATIN CALCIUM 40 MG: 40 TABLET, FILM COATED ORAL at 08:12

## 2023-01-01 RX ADMIN — METFORMIN HYDROCHLORIDE 2000 MG: 500 TABLET, EXTENDED RELEASE ORAL at 08:37

## 2023-01-01 RX ADMIN — AMLODIPINE BESYLATE 10 MG: 10 TABLET ORAL at 13:30

## 2023-01-01 RX ADMIN — METFORMIN ER 500 MG 2000 MG: 500 TABLET ORAL at 09:08

## 2023-01-01 RX ADMIN — ATORVASTATIN CALCIUM 40 MG: 40 TABLET, FILM COATED ORAL at 11:01

## 2023-01-01 RX ADMIN — Medication 1 CAPSULE: at 21:02

## 2023-01-01 RX ADMIN — ACETAMINOPHEN 325MG 975 MG: 325 TABLET ORAL at 11:07

## 2023-01-01 RX ADMIN — THIAMINE HCL TAB 100 MG 100 MG: 100 TAB at 11:09

## 2023-01-01 RX ADMIN — APIXABAN 5 MG: 2.5 TABLET, FILM COATED ORAL at 10:14

## 2023-01-01 RX ADMIN — ACETAMINOPHEN 1000 MG: 500 TABLET ORAL at 08:52

## 2023-01-01 RX ADMIN — SENNOSIDES AND DOCUSATE SODIUM 1 TABLET: 8.6; 5 TABLET ORAL at 08:04

## 2023-01-01 RX ADMIN — HEPARIN, PORCINE (PF) 10 UNIT/ML INTRAVENOUS SYRINGE 5 ML: at 07:26

## 2023-01-01 RX ADMIN — INSULIN ASPART 1 UNITS: 100 INJECTION, SOLUTION INTRAVENOUS; SUBCUTANEOUS at 11:46

## 2023-01-01 RX ADMIN — LIRAGLUTIDE 1.8 MG: 6 INJECTION SUBCUTANEOUS at 09:17

## 2023-01-01 RX ADMIN — ATORVASTATIN CALCIUM 40 MG: 40 TABLET, FILM COATED ORAL at 14:04

## 2023-01-01 RX ADMIN — SENNOSIDES AND DOCUSATE SODIUM 1 TABLET: 8.6; 5 TABLET ORAL at 09:06

## 2023-01-01 RX ADMIN — HEPARIN SODIUM 5000 UNITS: 5000 INJECTION, SOLUTION INTRAVENOUS; SUBCUTANEOUS at 09:33

## 2023-01-01 RX ADMIN — APIXABAN 5 MG: 5 TABLET, FILM COATED ORAL at 20:16

## 2023-01-01 RX ADMIN — METHOCARBAMOL 500 MG: 500 TABLET ORAL at 08:41

## 2023-01-01 RX ADMIN — HEPARIN, PORCINE (PF) 10 UNIT/ML INTRAVENOUS SYRINGE 5 ML: at 20:54

## 2023-01-01 RX ADMIN — CEFTAZIDIME 2 G: 2 INJECTION, POWDER, FOR SOLUTION INTRAVENOUS at 21:21

## 2023-01-01 RX ADMIN — DOXYCYCLINE 100 MG: 100 CAPSULE ORAL at 18:33

## 2023-01-01 RX ADMIN — ACETAMINOPHEN 650 MG: 325 TABLET, FILM COATED ORAL at 08:48

## 2023-01-01 RX ADMIN — THIAMINE HCL TAB 100 MG 100 MG: 100 TAB at 08:37

## 2023-01-01 RX ADMIN — DICLOFENAC SODIUM 2 G: 10 GEL TOPICAL at 19:49

## 2023-01-01 RX ADMIN — APIXABAN 5 MG: 5 TABLET, FILM COATED ORAL at 08:59

## 2023-01-01 RX ADMIN — MICONAZOLE NITRATE: 20 POWDER TOPICAL at 21:00

## 2023-01-01 RX ADMIN — DOCUSATE SODIUM 100 MG: 100 CAPSULE, LIQUID FILLED ORAL at 20:13

## 2023-01-01 RX ADMIN — PIOGLITAZONE HYDROCHLORIDE 45 MG: 45 TABLET ORAL at 08:53

## 2023-01-01 RX ADMIN — SODIUM CHLORIDE, PRESERVATIVE FREE 10 ML: 5 INJECTION INTRAVENOUS at 22:56

## 2023-01-01 RX ADMIN — METHOCARBAMOL 500 MG: 500 TABLET ORAL at 15:33

## 2023-01-01 RX ADMIN — Medication 1 CAPSULE: at 10:33

## 2023-01-01 RX ADMIN — MULTIPLE VITAMINS W/ MINERALS TAB 1 TABLET: TAB at 14:44

## 2023-01-01 RX ADMIN — ACETAMINOPHEN 650 MG: 325 TABLET, FILM COATED ORAL at 10:09

## 2023-01-01 RX ADMIN — Medication 25 MG: at 21:31

## 2023-01-01 RX ADMIN — ATORVASTATIN CALCIUM 40 MG: 40 TABLET, FILM COATED ORAL at 11:37

## 2023-01-01 RX ADMIN — PIOGLITAZONE HYDROCHLORIDE 45 MG: 45 TABLET ORAL at 09:17

## 2023-01-01 RX ADMIN — METHOCARBAMOL 500 MG: 500 TABLET ORAL at 10:36

## 2023-01-01 RX ADMIN — DICLOFENAC SODIUM 2 G: 10 GEL TOPICAL at 10:00

## 2023-01-01 RX ADMIN — APIXABAN 5 MG: 2.5 TABLET, FILM COATED ORAL at 20:14

## 2023-01-01 RX ADMIN — ACETAMINOPHEN 650 MG: 325 TABLET, FILM COATED ORAL at 10:22

## 2023-01-01 RX ADMIN — APIXABAN 5 MG: 2.5 TABLET, FILM COATED ORAL at 10:07

## 2023-01-01 RX ADMIN — INSULIN ASPART 1 UNITS: 100 INJECTION, SOLUTION INTRAVENOUS; SUBCUTANEOUS at 18:07

## 2023-01-01 RX ADMIN — ATORVASTATIN CALCIUM 40 MG: 40 TABLET, FILM COATED ORAL at 12:19

## 2023-01-01 RX ADMIN — Medication 1 CAPSULE: at 21:16

## 2023-01-01 RX ADMIN — HEPARIN SODIUM 5000 UNITS: 5000 INJECTION, SOLUTION INTRAVENOUS; SUBCUTANEOUS at 21:11

## 2023-01-01 RX ADMIN — MICONAZOLE NITRATE: 20 POWDER TOPICAL at 21:07

## 2023-01-01 RX ADMIN — METHOCARBAMOL 500 MG: 500 TABLET ORAL at 09:12

## 2023-01-01 RX ADMIN — METHOCARBAMOL 500 MG: 500 TABLET ORAL at 12:28

## 2023-01-01 RX ADMIN — METFORMIN ER 500 MG 2000 MG: 500 TABLET ORAL at 11:48

## 2023-01-01 RX ADMIN — MULTIPLE VITAMINS W/ MINERALS TAB 1 TABLET: TAB at 12:10

## 2023-01-01 RX ADMIN — FOLIC ACID 1 MG: 1 TABLET ORAL at 08:23

## 2023-01-01 RX ADMIN — APIXABAN 5 MG: 2.5 TABLET, FILM COATED ORAL at 10:12

## 2023-01-01 RX ADMIN — ACETAMINOPHEN 325MG 975 MG: 325 TABLET ORAL at 06:09

## 2023-01-01 RX ADMIN — MICONAZOLE NITRATE: 20 POWDER TOPICAL at 09:48

## 2023-01-01 RX ADMIN — Medication 1 CAPSULE: at 09:02

## 2023-01-01 RX ADMIN — Medication 2 G: at 18:01

## 2023-01-01 RX ADMIN — OXYCODONE HYDROCHLORIDE 10 MG: 5 TABLET ORAL at 15:25

## 2023-01-01 RX ADMIN — MICONAZOLE NITRATE: 20 POWDER TOPICAL at 20:15

## 2023-01-01 RX ADMIN — ATORVASTATIN CALCIUM 40 MG: 40 TABLET, FILM COATED ORAL at 08:23

## 2023-01-01 RX ADMIN — Medication 1 CAPSULE: at 20:08

## 2023-01-01 RX ADMIN — MICONAZOLE NITRATE: 20 POWDER TOPICAL at 21:39

## 2023-01-01 RX ADMIN — DOXYCYCLINE 100 MG: 100 CAPSULE ORAL at 07:10

## 2023-01-01 RX ADMIN — SENNOSIDES AND DOCUSATE SODIUM 1 TABLET: 8.6; 5 TABLET ORAL at 08:53

## 2023-01-01 RX ADMIN — MICONAZOLE NITRATE: 20 POWDER TOPICAL at 09:43

## 2023-01-01 RX ADMIN — Medication 25 MG: at 17:36

## 2023-01-01 RX ADMIN — Medication 25 MG: at 21:28

## 2023-01-01 RX ADMIN — MULTIPLE VITAMINS W/ MINERALS TAB 1 TABLET: TAB at 11:29

## 2023-01-01 RX ADMIN — METFORMIN HYDROCHLORIDE 2000 MG: 500 TABLET, EXTENDED RELEASE ORAL at 09:32

## 2023-01-01 RX ADMIN — CIPROFLOXACIN HYDROCHLORIDE 500 MG: 500 TABLET, FILM COATED ORAL at 06:26

## 2023-01-01 RX ADMIN — ACETAMINOPHEN 325MG 975 MG: 325 TABLET ORAL at 12:24

## 2023-01-01 RX ADMIN — TRAMADOL HYDROCHLORIDE 50 MG: 50 TABLET, COATED ORAL at 23:10

## 2023-01-01 RX ADMIN — ACETAMINOPHEN 975 MG: 325 TABLET, FILM COATED ORAL at 22:17

## 2023-01-01 RX ADMIN — PIOGLITAZONE HYDROCHLORIDE 45 MG: 45 TABLET ORAL at 11:18

## 2023-01-01 RX ADMIN — APIXABAN 5 MG: 2.5 TABLET, FILM COATED ORAL at 08:31

## 2023-01-01 RX ADMIN — APIXABAN 5 MG: 2.5 TABLET, FILM COATED ORAL at 21:34

## 2023-01-01 RX ADMIN — AMLODIPINE BESYLATE 10 MG: 10 TABLET ORAL at 14:05

## 2023-01-01 RX ADMIN — LIRAGLUTIDE 1.8 MG: 6 INJECTION SUBCUTANEOUS at 09:06

## 2023-01-01 RX ADMIN — DEXTROSE MONOHYDRATE: 50 INJECTION, SOLUTION INTRAVENOUS at 22:54

## 2023-01-01 RX ADMIN — VANCOMYCIN HYDROCHLORIDE 1000 MG: 1 INJECTION, SOLUTION INTRAVENOUS at 13:25

## 2023-01-01 RX ADMIN — METFORMIN ER 500 MG 2000 MG: 500 TABLET ORAL at 09:25

## 2023-01-01 RX ADMIN — ACETAMINOPHEN 650 MG: 325 TABLET, FILM COATED ORAL at 21:54

## 2023-01-01 RX ADMIN — Medication 2.5 MG: at 16:14

## 2023-01-01 RX ADMIN — PIOGLITAZONE HYDROCHLORIDE 45 MG: 45 TABLET ORAL at 08:37

## 2023-01-01 RX ADMIN — APIXABAN 5 MG: 2.5 TABLET, FILM COATED ORAL at 20:38

## 2023-01-01 RX ADMIN — METHOCARBAMOL 500 MG: 500 TABLET ORAL at 20:19

## 2023-01-01 RX ADMIN — HEPARIN SODIUM 6000 UNITS: 1000 INJECTION INTRAVENOUS; SUBCUTANEOUS at 12:47

## 2023-01-01 RX ADMIN — ACETAMINOPHEN 325MG 975 MG: 325 TABLET ORAL at 13:52

## 2023-01-01 RX ADMIN — ATORVASTATIN CALCIUM 40 MG: 40 TABLET, FILM COATED ORAL at 09:29

## 2023-01-01 RX ADMIN — APIXABAN 5 MG: 2.5 TABLET, FILM COATED ORAL at 20:59

## 2023-01-01 RX ADMIN — APIXABAN 5 MG: 2.5 TABLET, FILM COATED ORAL at 08:52

## 2023-01-01 RX ADMIN — Medication 400 MG: at 09:06

## 2023-01-01 RX ADMIN — CEFEPIME HYDROCHLORIDE 2 G: 2 INJECTION, POWDER, FOR SOLUTION INTRAVENOUS at 21:01

## 2023-01-01 RX ADMIN — DOXYCYCLINE 100 MG: 100 CAPSULE ORAL at 05:15

## 2023-01-01 RX ADMIN — ACETAMINOPHEN 1000 MG: 500 TABLET ORAL at 19:05

## 2023-01-01 RX ADMIN — DICLOFENAC SODIUM 2 G: 10 GEL TOPICAL at 20:03

## 2023-01-01 RX ADMIN — APIXABAN 2.5 MG: 2.5 TABLET, FILM COATED ORAL at 20:08

## 2023-01-01 RX ADMIN — APIXABAN 5 MG: 2.5 TABLET, FILM COATED ORAL at 20:34

## 2023-01-01 RX ADMIN — APIXABAN 5 MG: 2.5 TABLET, FILM COATED ORAL at 20:22

## 2023-01-01 RX ADMIN — DICLOFENAC SODIUM 2 G: 10 GEL TOPICAL at 13:02

## 2023-01-01 RX ADMIN — DOXYCYCLINE 100 MG: 100 CAPSULE ORAL at 21:09

## 2023-01-01 RX ADMIN — METFORMIN ER 500 MG 2000 MG: 500 TABLET ORAL at 09:32

## 2023-01-01 RX ADMIN — METFORMIN ER 500 MG 2000 MG: 500 TABLET ORAL at 11:17

## 2023-01-01 RX ADMIN — CARVEDILOL 6.25 MG: 6.25 TABLET, FILM COATED ORAL at 08:31

## 2023-01-01 RX ADMIN — ATORVASTATIN CALCIUM 40 MG: 40 TABLET, FILM COATED ORAL at 09:20

## 2023-01-01 RX ADMIN — CIPROFLOXACIN HYDROCHLORIDE 500 MG: 500 TABLET, FILM COATED ORAL at 18:49

## 2023-01-01 RX ADMIN — PIOGLITAZONE HYDROCHLORIDE 45 MG: 45 TABLET ORAL at 08:52

## 2023-01-01 RX ADMIN — FOLIC ACID 1 MG: 1 TABLET ORAL at 13:30

## 2023-01-01 RX ADMIN — FUROSEMIDE 40 MG: 40 TABLET ORAL at 15:08

## 2023-01-01 RX ADMIN — THIAMINE HCL TAB 100 MG 100 MG: 100 TAB at 11:11

## 2023-01-01 RX ADMIN — APIXABAN 2.5 MG: 2.5 TABLET, FILM COATED ORAL at 19:32

## 2023-01-01 RX ADMIN — ACETAMINOPHEN 1000 MG: 500 TABLET ORAL at 09:23

## 2023-01-01 RX ADMIN — MULTIPLE VITAMINS W/ MINERALS TAB 1 TABLET: TAB at 12:57

## 2023-01-01 RX ADMIN — Medication 50 MG: at 20:54

## 2023-01-01 RX ADMIN — DOXYCYCLINE HYCLATE 100 MG: 100 CAPSULE ORAL at 09:57

## 2023-01-01 RX ADMIN — Medication 5 MG: at 09:54

## 2023-01-01 RX ADMIN — MELATONIN TAB 3 MG 3 MG: 3 TAB at 21:09

## 2023-01-01 RX ADMIN — APIXABAN 5 MG: 2.5 TABLET, FILM COATED ORAL at 21:26

## 2023-01-01 RX ADMIN — CIPROFLOXACIN HYDROCHLORIDE 500 MG: 500 TABLET, FILM COATED ORAL at 19:57

## 2023-01-01 RX ADMIN — SENNOSIDES AND DOCUSATE SODIUM 1 TABLET: 50; 8.6 TABLET ORAL at 20:02

## 2023-01-01 RX ADMIN — ACETAMINOPHEN 325MG 975 MG: 325 TABLET ORAL at 12:53

## 2023-01-01 RX ADMIN — METHOCARBAMOL 500 MG: 500 TABLET ORAL at 10:13

## 2023-01-01 RX ADMIN — Medication 50 MCG: at 11:08

## 2023-01-01 RX ADMIN — Medication 1 CAPSULE: at 06:01

## 2023-01-01 RX ADMIN — Medication 12.5 MG: at 22:27

## 2023-01-01 RX ADMIN — FERROUS SULFATE TAB 325 MG (65 MG ELEMENTAL FE) 325 MG: 325 (65 FE) TAB at 11:35

## 2023-01-01 RX ADMIN — LIRAGLUTIDE 1.8 MG: 6 INJECTION SUBCUTANEOUS at 10:03

## 2023-01-01 RX ADMIN — ACETAMINOPHEN 325MG 975 MG: 325 TABLET ORAL at 06:39

## 2023-01-01 RX ADMIN — Medication 50 MCG: at 11:33

## 2023-01-01 RX ADMIN — ATORVASTATIN CALCIUM 40 MG: 40 TABLET, FILM COATED ORAL at 13:28

## 2023-01-01 RX ADMIN — ATORVASTATIN CALCIUM 40 MG: 40 TABLET, FILM COATED ORAL at 09:04

## 2023-01-01 RX ADMIN — MICONAZOLE NITRATE: 20 POWDER TOPICAL at 08:56

## 2023-01-01 RX ADMIN — MICONAZOLE NITRATE: 20 POWDER TOPICAL at 21:29

## 2023-01-01 RX ADMIN — ACETAMINOPHEN 975 MG: 325 TABLET, FILM COATED ORAL at 14:04

## 2023-01-01 RX ADMIN — Medication 1 CAPSULE: at 20:47

## 2023-01-01 RX ADMIN — ATORVASTATIN CALCIUM 40 MG: 40 TABLET, FILM COATED ORAL at 09:45

## 2023-01-01 RX ADMIN — FUROSEMIDE 20 MG: 20 TABLET ORAL at 08:50

## 2023-01-01 RX ADMIN — METHOCARBAMOL 500 MG: 500 TABLET ORAL at 20:26

## 2023-01-01 RX ADMIN — ACETAMINOPHEN 1000 MG: 325 TABLET ORAL at 20:20

## 2023-01-01 RX ADMIN — MULTIPLE VITAMINS W/ MINERALS TAB 1 TABLET: TAB at 13:09

## 2023-01-01 RX ADMIN — ACETAMINOPHEN 1000 MG: 325 TABLET ORAL at 09:17

## 2023-01-01 RX ADMIN — APIXABAN 5 MG: 2.5 TABLET, FILM COATED ORAL at 21:19

## 2023-01-01 RX ADMIN — HEPARIN, PORCINE (PF) 10 UNIT/ML INTRAVENOUS SYRINGE 5 ML: at 14:49

## 2023-01-01 RX ADMIN — ATORVASTATIN CALCIUM 40 MG: 40 TABLET, FILM COATED ORAL at 09:37

## 2023-01-01 RX ADMIN — MICONAZOLE NITRATE: 20 POWDER TOPICAL at 09:33

## 2023-01-01 RX ADMIN — MULTIPLE VITAMINS W/ MINERALS TAB 1 TABLET: TAB at 12:22

## 2023-01-01 RX ADMIN — LIRAGLUTIDE 1.8 MG: 6 INJECTION SUBCUTANEOUS at 09:18

## 2023-01-01 RX ADMIN — CIPROFLOXACIN HYDROCHLORIDE 500 MG: 500 TABLET, FILM COATED ORAL at 08:31

## 2023-01-01 RX ADMIN — THIAMINE HCL TAB 100 MG 100 MG: 100 TAB at 11:19

## 2023-01-01 RX ADMIN — METHOCARBAMOL 500 MG: 500 TABLET ORAL at 08:35

## 2023-01-01 RX ADMIN — APIXABAN 5 MG: 2.5 TABLET, FILM COATED ORAL at 09:16

## 2023-01-01 RX ADMIN — Medication 1 CAPSULE: at 11:00

## 2023-01-01 RX ADMIN — FERROUS SULFATE TAB 325 MG (65 MG ELEMENTAL FE) 325 MG: 325 (65 FE) TAB at 11:11

## 2023-01-01 RX ADMIN — Medication 25 MG: at 10:11

## 2023-01-01 RX ADMIN — APIXABAN 2.5 MG: 2.5 TABLET, FILM COATED ORAL at 11:08

## 2023-01-01 RX ADMIN — OXYCODONE HYDROCHLORIDE 10 MG: 10 TABLET ORAL at 13:07

## 2023-01-01 RX ADMIN — HEPARIN, PORCINE (PF) 10 UNIT/ML INTRAVENOUS SYRINGE 5 ML: at 16:26

## 2023-01-01 RX ADMIN — CIPROFLOXACIN HYDROCHLORIDE 500 MG: 500 TABLET, FILM COATED ORAL at 09:23

## 2023-01-01 RX ADMIN — METFORMIN ER 500 MG 2000 MG: 500 TABLET ORAL at 10:31

## 2023-01-01 RX ADMIN — APIXABAN 5 MG: 2.5 TABLET, FILM COATED ORAL at 08:19

## 2023-01-01 RX ADMIN — ATORVASTATIN CALCIUM 40 MG: 40 TABLET, FILM COATED ORAL at 09:52

## 2023-01-01 RX ADMIN — HEPARIN, PORCINE (PF) 10 UNIT/ML INTRAVENOUS SYRINGE 5 ML: at 08:32

## 2023-01-01 RX ADMIN — ACETAMINOPHEN 1000 MG: 500 TABLET ORAL at 09:31

## 2023-01-01 RX ADMIN — OXYCODONE HYDROCHLORIDE 10 MG: 10 TABLET ORAL at 21:59

## 2023-01-01 RX ADMIN — Medication 1 CAPSULE: at 20:19

## 2023-01-01 RX ADMIN — POTASSIUM CHLORIDE 40 MEQ: 1.5 POWDER, FOR SOLUTION ORAL at 15:03

## 2023-01-01 RX ADMIN — CIPROFLOXACIN HYDROCHLORIDE 500 MG: 500 TABLET, FILM COATED ORAL at 12:45

## 2023-01-01 RX ADMIN — CIPROFLOXACIN HYDROCHLORIDE 500 MG: 500 TABLET, FILM COATED ORAL at 08:41

## 2023-01-01 RX ADMIN — LIRAGLUTIDE 1.8 MG: 6 INJECTION SUBCUTANEOUS at 08:31

## 2023-01-01 RX ADMIN — ACETAMINOPHEN 1000 MG: 325 TABLET ORAL at 10:35

## 2023-01-01 RX ADMIN — OXYCODONE HYDROCHLORIDE 10 MG: 10 TABLET ORAL at 09:14

## 2023-01-01 RX ADMIN — TRAMADOL HYDROCHLORIDE 50 MG: 50 TABLET, COATED ORAL at 06:07

## 2023-01-01 RX ADMIN — AMLODIPINE BESYLATE 10 MG: 10 TABLET ORAL at 11:21

## 2023-01-01 RX ADMIN — ATORVASTATIN CALCIUM 40 MG: 40 TABLET, FILM COATED ORAL at 11:08

## 2023-01-01 RX ADMIN — MICONAZOLE NITRATE: 20 POWDER TOPICAL at 22:21

## 2023-01-01 RX ADMIN — Medication 1 CAPSULE: at 09:25

## 2023-01-01 RX ADMIN — PIOGLITAZONE HYDROCHLORIDE 45 MG: 45 TABLET ORAL at 10:06

## 2023-01-01 RX ADMIN — APIXABAN 5 MG: 2.5 TABLET, FILM COATED ORAL at 19:55

## 2023-01-01 RX ADMIN — MICONAZOLE NITRATE: 20 POWDER TOPICAL at 11:36

## 2023-01-01 RX ADMIN — ACETAMINOPHEN 650 MG: 325 TABLET, FILM COATED ORAL at 22:00

## 2023-01-01 RX ADMIN — ACETAMINOPHEN 975 MG: 325 TABLET, FILM COATED ORAL at 12:48

## 2023-01-01 RX ADMIN — APIXABAN 2.5 MG: 2.5 TABLET, FILM COATED ORAL at 20:40

## 2023-01-01 RX ADMIN — METHOCARBAMOL 500 MG: 500 TABLET ORAL at 08:20

## 2023-01-01 RX ADMIN — ATORVASTATIN CALCIUM 40 MG: 40 TABLET, FILM COATED ORAL at 08:55

## 2023-01-01 RX ADMIN — METHOCARBAMOL 500 MG: 500 TABLET ORAL at 22:20

## 2023-01-01 RX ADMIN — METHOCARBAMOL 500 MG: 500 TABLET ORAL at 20:27

## 2023-01-01 RX ADMIN — ACETAMINOPHEN 650 MG: 325 TABLET, FILM COATED ORAL at 19:09

## 2023-01-01 RX ADMIN — PIOGLITAZONE HYDROCHLORIDE 45 MG: 45 TABLET ORAL at 09:41

## 2023-01-01 RX ADMIN — Medication 1 CAPSULE: at 09:18

## 2023-01-01 RX ADMIN — FENTANYL CITRATE 50 MCG: 50 INJECTION, SOLUTION INTRAMUSCULAR; INTRAVENOUS at 12:53

## 2023-01-01 RX ADMIN — METHOCARBAMOL 500 MG: 500 TABLET ORAL at 05:47

## 2023-01-01 RX ADMIN — Medication 1 CAPSULE: at 20:31

## 2023-01-01 RX ADMIN — ATORVASTATIN CALCIUM 40 MG: 40 TABLET, FILM COATED ORAL at 11:33

## 2023-01-01 RX ADMIN — METHOCARBAMOL 500 MG: 500 TABLET ORAL at 23:05

## 2023-01-01 RX ADMIN — AMLODIPINE BESYLATE 10 MG: 10 TABLET ORAL at 13:01

## 2023-01-01 RX ADMIN — ATORVASTATIN CALCIUM 40 MG: 40 TABLET, FILM COATED ORAL at 10:19

## 2023-01-01 RX ADMIN — Medication 25 MG: at 16:42

## 2023-01-01 RX ADMIN — LIRAGLUTIDE 1.8 MG: 6 INJECTION SUBCUTANEOUS at 11:18

## 2023-01-01 RX ADMIN — ACETAMINOPHEN 975 MG: 325 TABLET, FILM COATED ORAL at 21:04

## 2023-01-01 RX ADMIN — LIRAGLUTIDE 1.8 MG: 6 INJECTION SUBCUTANEOUS at 11:26

## 2023-01-01 RX ADMIN — Medication 1 CAPSULE: at 09:38

## 2023-01-01 RX ADMIN — APIXABAN 2.5 MG: 2.5 TABLET, FILM COATED ORAL at 11:11

## 2023-01-01 RX ADMIN — MELATONIN TAB 3 MG 3 MG: 3 TAB at 19:56

## 2023-01-01 RX ADMIN — DOXYCYCLINE 100 MG: 100 CAPSULE ORAL at 06:13

## 2023-01-01 RX ADMIN — ATORVASTATIN CALCIUM 40 MG: 40 TABLET, FILM COATED ORAL at 09:44

## 2023-01-01 RX ADMIN — Medication 1 CAPSULE: at 08:51

## 2023-01-01 RX ADMIN — TRAMADOL HYDROCHLORIDE 50 MG: 50 TABLET, COATED ORAL at 11:54

## 2023-01-01 RX ADMIN — LIRAGLUTIDE 1.8 MG: 6 INJECTION SUBCUTANEOUS at 09:23

## 2023-01-01 RX ADMIN — DOCUSATE SODIUM 100 MG: 100 CAPSULE, LIQUID FILLED ORAL at 20:18

## 2023-01-01 RX ADMIN — ACETAMINOPHEN 325MG 975 MG: 325 TABLET ORAL at 14:56

## 2023-01-01 RX ADMIN — POLYETHYLENE GLYCOL 3350 17 G: 17 POWDER, FOR SOLUTION ORAL at 08:48

## 2023-01-01 RX ADMIN — FOLIC ACID 1 MG: 1 TABLET ORAL at 09:11

## 2023-01-01 RX ADMIN — METHOCARBAMOL 500 MG: 500 TABLET ORAL at 21:34

## 2023-01-01 RX ADMIN — FUROSEMIDE 60 MG: 40 TABLET ORAL at 08:20

## 2023-01-01 RX ADMIN — Medication 25 MG: at 20:12

## 2023-01-01 RX ADMIN — ACETAMINOPHEN 650 MG: 325 TABLET, FILM COATED ORAL at 06:55

## 2023-01-01 RX ADMIN — MULTIPLE VITAMINS W/ MINERALS TAB 1 TABLET: TAB at 13:46

## 2023-01-01 RX ADMIN — Medication 25 MG: at 09:13

## 2023-01-01 RX ADMIN — APIXABAN 5 MG: 2.5 TABLET, FILM COATED ORAL at 20:58

## 2023-01-01 RX ADMIN — MICONAZOLE NITRATE: 20 POWDER TOPICAL at 09:13

## 2023-01-01 RX ADMIN — APIXABAN 5 MG: 2.5 TABLET, FILM COATED ORAL at 22:17

## 2023-01-01 RX ADMIN — Medication 25 MG: at 21:49

## 2023-01-01 RX ADMIN — ACETAMINOPHEN 325MG 975 MG: 325 TABLET ORAL at 22:10

## 2023-01-01 RX ADMIN — MICONAZOLE NITRATE: 20 POWDER TOPICAL at 09:32

## 2023-01-01 RX ADMIN — Medication 1 CAPSULE: at 20:09

## 2023-01-01 RX ADMIN — APIXABAN 5 MG: 2.5 TABLET, FILM COATED ORAL at 20:27

## 2023-01-01 RX ADMIN — APIXABAN 5 MG: 2.5 TABLET, FILM COATED ORAL at 12:11

## 2023-01-01 RX ADMIN — APIXABAN 5 MG: 2.5 TABLET, FILM COATED ORAL at 21:17

## 2023-01-01 RX ADMIN — ATORVASTATIN CALCIUM 40 MG: 40 TABLET, FILM COATED ORAL at 09:35

## 2023-01-01 RX ADMIN — MELATONIN TAB 3 MG 3 MG: 3 TAB at 20:22

## 2023-01-01 RX ADMIN — ATORVASTATIN CALCIUM 40 MG: 40 TABLET, FILM COATED ORAL at 08:46

## 2023-01-01 RX ADMIN — APIXABAN 5 MG: 2.5 TABLET, FILM COATED ORAL at 21:32

## 2023-01-01 RX ADMIN — ATORVASTATIN CALCIUM 40 MG: 40 TABLET, FILM COATED ORAL at 07:58

## 2023-01-01 RX ADMIN — ATORVASTATIN CALCIUM 40 MG: 40 TABLET, FILM COATED ORAL at 09:00

## 2023-01-01 RX ADMIN — DOXYCYCLINE 100 MG: 100 CAPSULE ORAL at 22:01

## 2023-01-01 RX ADMIN — METHOCARBAMOL 500 MG: 500 TABLET ORAL at 00:28

## 2023-01-01 RX ADMIN — LIRAGLUTIDE 1.8 MG: 6 INJECTION SUBCUTANEOUS at 09:08

## 2023-01-01 RX ADMIN — Medication 50 MG: at 06:12

## 2023-01-01 RX ADMIN — PIOGLITAZONE HYDROCHLORIDE 45 MG: 45 TABLET ORAL at 10:33

## 2023-01-01 RX ADMIN — AMLODIPINE BESYLATE 10 MG: 10 TABLET ORAL at 14:04

## 2023-01-01 RX ADMIN — Medication 50 MCG: at 11:27

## 2023-01-01 RX ADMIN — TRAMADOL HYDROCHLORIDE 50 MG: 50 TABLET, COATED ORAL at 23:09

## 2023-01-01 RX ADMIN — HEPARIN, PORCINE (PF) 10 UNIT/ML INTRAVENOUS SYRINGE 5 ML: at 08:04

## 2023-01-01 RX ADMIN — ATORVASTATIN CALCIUM 40 MG: 40 TABLET, FILM COATED ORAL at 09:28

## 2023-01-01 RX ADMIN — CARVEDILOL 6.25 MG: 6.25 TABLET, FILM COATED ORAL at 17:36

## 2023-01-01 RX ADMIN — APIXABAN 5 MG: 2.5 TABLET, FILM COATED ORAL at 08:29

## 2023-01-01 RX ADMIN — FOLIC ACID 1 MG: 1 TABLET ORAL at 14:04

## 2023-01-01 RX ADMIN — APIXABAN 5 MG: 2.5 TABLET, FILM COATED ORAL at 09:47

## 2023-01-01 RX ADMIN — CIPROFLOXACIN HYDROCHLORIDE 500 MG: 500 TABLET, FILM COATED ORAL at 22:01

## 2023-01-01 RX ADMIN — METFORMIN HYDROCHLORIDE 2000 MG: 500 TABLET, EXTENDED RELEASE ORAL at 10:03

## 2023-01-01 RX ADMIN — APIXABAN 5 MG: 2.5 TABLET, FILM COATED ORAL at 09:34

## 2023-01-01 RX ADMIN — APIXABAN 5 MG: 5 TABLET, FILM COATED ORAL at 08:12

## 2023-01-01 RX ADMIN — DOCUSATE SODIUM 100 MG: 100 CAPSULE, LIQUID FILLED ORAL at 08:43

## 2023-01-01 RX ADMIN — Medication 25 MG: at 10:00

## 2023-01-01 RX ADMIN — ATORVASTATIN CALCIUM 40 MG: 40 TABLET, FILM COATED ORAL at 09:48

## 2023-01-01 RX ADMIN — Medication 1 CAPSULE: at 19:58

## 2023-01-01 RX ADMIN — ACETAMINOPHEN 1000 MG: 500 TABLET ORAL at 08:17

## 2023-01-01 RX ADMIN — METHOCARBAMOL 500 MG: 500 TABLET ORAL at 19:54

## 2023-01-01 RX ADMIN — AMLODIPINE BESYLATE 10 MG: 10 TABLET ORAL at 08:57

## 2023-01-01 RX ADMIN — Medication 50 MCG: at 09:15

## 2023-01-01 RX ADMIN — THIAMINE HCL TAB 100 MG 100 MG: 100 TAB at 12:48

## 2023-01-01 RX ADMIN — ACETAMINOPHEN 1000 MG: 500 TABLET ORAL at 09:55

## 2023-01-01 RX ADMIN — LIDOCAINE: 40 CREAM TOPICAL at 10:56

## 2023-01-01 RX ADMIN — APIXABAN 5 MG: 2.5 TABLET, FILM COATED ORAL at 20:40

## 2023-01-01 RX ADMIN — CIPROFLOXACIN HYDROCHLORIDE 500 MG: 500 TABLET, FILM COATED ORAL at 08:36

## 2023-01-01 RX ADMIN — OXYCODONE HYDROCHLORIDE 5 MG: 5 TABLET ORAL at 20:58

## 2023-01-01 RX ADMIN — MICONAZOLE NITRATE: 20 POWDER TOPICAL at 09:31

## 2023-01-01 RX ADMIN — Medication 25 MG: at 14:45

## 2023-01-01 RX ADMIN — BISACODYL 10 MG: 10 SUPPOSITORY RECTAL at 10:27

## 2023-01-01 RX ADMIN — METHOCARBAMOL 500 MG: 500 TABLET ORAL at 11:21

## 2023-01-01 RX ADMIN — FOLIC ACID 1 MG: 1 TABLET ORAL at 08:40

## 2023-01-01 RX ADMIN — ATORVASTATIN CALCIUM 40 MG: 40 TABLET, FILM COATED ORAL at 10:08

## 2023-01-01 RX ADMIN — Medication 50 MCG: at 13:52

## 2023-01-01 RX ADMIN — OXYCODONE HYDROCHLORIDE 5 MG: 5 TABLET ORAL at 00:23

## 2023-01-01 RX ADMIN — Medication 50 MCG: at 07:35

## 2023-01-01 RX ADMIN — ACETAMINOPHEN 650 MG: 325 TABLET, FILM COATED ORAL at 20:33

## 2023-01-01 RX ADMIN — Medication 1 CAPSULE: at 10:42

## 2023-01-01 RX ADMIN — FOLIC ACID 1 MG: 1 TABLET ORAL at 09:54

## 2023-01-01 RX ADMIN — Medication 25 MG: at 09:33

## 2023-01-01 RX ADMIN — APIXABAN 5 MG: 2.5 TABLET, FILM COATED ORAL at 20:09

## 2023-01-01 RX ADMIN — Medication 50 MCG: at 11:20

## 2023-01-01 RX ADMIN — Medication 1 CAPSULE: at 20:56

## 2023-01-01 RX ADMIN — Medication 1 CAPSULE: at 10:35

## 2023-01-01 RX ADMIN — ATORVASTATIN CALCIUM 40 MG: 40 TABLET, FILM COATED ORAL at 08:17

## 2023-01-01 RX ADMIN — TRAMADOL HYDROCHLORIDE 50 MG: 50 TABLET, COATED ORAL at 08:19

## 2023-01-01 RX ADMIN — POTASSIUM CHLORIDE 20 MEQ: 20 SOLUTION ORAL at 10:27

## 2023-01-01 RX ADMIN — FUROSEMIDE 60 MG: 40 TABLET ORAL at 08:51

## 2023-01-01 RX ADMIN — DOXYCYCLINE HYCLATE 100 MG: 100 CAPSULE ORAL at 20:46

## 2023-01-01 RX ADMIN — Medication 1 CAPSULE: at 19:41

## 2023-01-01 RX ADMIN — APIXABAN 5 MG: 2.5 TABLET, FILM COATED ORAL at 11:01

## 2023-01-01 RX ADMIN — APIXABAN 5 MG: 2.5 TABLET, FILM COATED ORAL at 09:22

## 2023-01-01 RX ADMIN — Medication 1 CAPSULE: at 19:56

## 2023-01-01 RX ADMIN — METHOCARBAMOL 500 MG: 500 TABLET ORAL at 21:54

## 2023-01-01 RX ADMIN — ACETAMINOPHEN 650 MG: 325 TABLET, FILM COATED ORAL at 17:52

## 2023-01-01 RX ADMIN — Medication 1 CAPSULE: at 20:39

## 2023-01-01 RX ADMIN — ACETAMINOPHEN 1000 MG: 500 TABLET ORAL at 12:21

## 2023-01-01 RX ADMIN — ATORVASTATIN CALCIUM 40 MG: 40 TABLET, FILM COATED ORAL at 10:33

## 2023-01-01 RX ADMIN — FOLIC ACID 1 MG: 1 TABLET ORAL at 12:48

## 2023-01-01 RX ADMIN — LIRAGLUTIDE 1.8 MG: 6 INJECTION SUBCUTANEOUS at 09:51

## 2023-01-01 RX ADMIN — Medication 3 MG: at 21:07

## 2023-01-01 RX ADMIN — Medication 5 ML: at 13:42

## 2023-01-01 RX ADMIN — Medication 25 MG: at 19:06

## 2023-01-01 RX ADMIN — FOLIC ACID 1 MG: 1 TABLET ORAL at 09:30

## 2023-01-01 RX ADMIN — HEPARIN, PORCINE (PF) 10 UNIT/ML INTRAVENOUS SYRINGE 5 ML: at 09:45

## 2023-01-01 RX ADMIN — OXYCODONE HYDROCHLORIDE 5 MG: 5 TABLET ORAL at 16:11

## 2023-01-01 RX ADMIN — ACETAMINOPHEN 650 MG: 325 TABLET, FILM COATED ORAL at 09:58

## 2023-01-01 RX ADMIN — ACETAMINOPHEN 325MG 975 MG: 325 TABLET ORAL at 12:54

## 2023-01-01 RX ADMIN — ACETAMINOPHEN 325MG 975 MG: 325 TABLET ORAL at 22:26

## 2023-01-01 RX ADMIN — Medication 1 CAPSULE: at 08:43

## 2023-01-01 RX ADMIN — ATORVASTATIN CALCIUM 40 MG: 40 TABLET, FILM COATED ORAL at 11:19

## 2023-01-01 RX ADMIN — METHOCARBAMOL 500 MG: 500 TABLET ORAL at 21:52

## 2023-01-01 RX ADMIN — POLYETHYLENE GLYCOL 3350 17 G: 17 POWDER, FOR SOLUTION ORAL at 08:28

## 2023-01-01 RX ADMIN — LIRAGLUTIDE 1.8 MG: 6 INJECTION SUBCUTANEOUS at 10:40

## 2023-01-01 RX ADMIN — Medication 1 CAPSULE: at 09:22

## 2023-01-01 RX ADMIN — APIXABAN 5 MG: 2.5 TABLET, FILM COATED ORAL at 20:01

## 2023-01-01 RX ADMIN — ACETAMINOPHEN 650 MG: 325 TABLET, FILM COATED ORAL at 08:59

## 2023-01-01 RX ADMIN — ACETAMINOPHEN 975 MG: 325 TABLET, FILM COATED ORAL at 21:18

## 2023-01-01 RX ADMIN — MICONAZOLE NITRATE: 20 POWDER TOPICAL at 09:29

## 2023-01-01 RX ADMIN — Medication 50 MCG: at 11:11

## 2023-01-01 RX ADMIN — ACETAMINOPHEN 1000 MG: 500 TABLET ORAL at 00:28

## 2023-01-01 RX ADMIN — Medication 25 MG: at 04:31

## 2023-01-01 RX ADMIN — PIOGLITAZONE HYDROCHLORIDE 45 MG: 45 TABLET ORAL at 09:58

## 2023-01-01 RX ADMIN — APIXABAN 5 MG: 2.5 TABLET, FILM COATED ORAL at 11:21

## 2023-01-01 RX ADMIN — ACETAMINOPHEN 1000 MG: 500 TABLET ORAL at 16:32

## 2023-01-01 RX ADMIN — CIPROFLOXACIN HYDROCHLORIDE 500 MG: 500 TABLET, FILM COATED ORAL at 08:01

## 2023-01-01 RX ADMIN — APIXABAN 5 MG: 5 TABLET, FILM COATED ORAL at 08:51

## 2023-01-01 RX ADMIN — ACETAMINOPHEN 650 MG: 325 TABLET, FILM COATED ORAL at 17:14

## 2023-01-01 RX ADMIN — HEPARIN SODIUM 5000 UNITS: 5000 INJECTION, SOLUTION INTRAVENOUS; SUBCUTANEOUS at 07:35

## 2023-01-01 RX ADMIN — CEFEPIME HYDROCHLORIDE 2 G: 2 INJECTION, POWDER, FOR SOLUTION INTRAVENOUS at 12:48

## 2023-01-01 RX ADMIN — Medication 2 G: at 13:11

## 2023-01-01 RX ADMIN — ATORVASTATIN CALCIUM 40 MG: 40 TABLET, FILM COATED ORAL at 08:36

## 2023-01-01 RX ADMIN — ACETAMINOPHEN 1000 MG: 325 TABLET ORAL at 18:37

## 2023-01-01 RX ADMIN — ATORVASTATIN CALCIUM 40 MG: 40 TABLET, FILM COATED ORAL at 10:20

## 2023-01-01 RX ADMIN — METHOCARBAMOL 500 MG: 500 TABLET ORAL at 15:08

## 2023-01-01 RX ADMIN — ATORVASTATIN CALCIUM 40 MG: 40 TABLET, FILM COATED ORAL at 09:16

## 2023-01-01 RX ADMIN — FERROUS SULFATE TAB 325 MG (65 MG ELEMENTAL FE) 325 MG: 325 (65 FE) TAB at 13:53

## 2023-01-01 RX ADMIN — CARVEDILOL 3.12 MG: 3.12 TABLET, FILM COATED ORAL at 17:33

## 2023-01-01 RX ADMIN — APIXABAN 5 MG: 2.5 TABLET, FILM COATED ORAL at 08:34

## 2023-01-01 RX ADMIN — ATORVASTATIN CALCIUM 40 MG: 40 TABLET, FILM COATED ORAL at 10:22

## 2023-01-01 RX ADMIN — APIXABAN 2.5 MG: 2.5 TABLET, FILM COATED ORAL at 20:18

## 2023-01-01 RX ADMIN — METHOCARBAMOL 500 MG: 500 TABLET ORAL at 13:21

## 2023-01-01 RX ADMIN — OXYCODONE HYDROCHLORIDE 5 MG: 5 TABLET ORAL at 13:34

## 2023-01-01 RX ADMIN — Medication 1 CAPSULE: at 20:50

## 2023-01-01 RX ADMIN — METFORMIN HYDROCHLORIDE 2000 MG: 500 TABLET, EXTENDED RELEASE ORAL at 08:58

## 2023-01-01 RX ADMIN — FOLIC ACID 1 MG: 1 TABLET ORAL at 07:35

## 2023-01-01 RX ADMIN — MICONAZOLE NITRATE: 20 POWDER TOPICAL at 09:59

## 2023-01-01 RX ADMIN — ATORVASTATIN CALCIUM 40 MG: 40 TABLET, FILM COATED ORAL at 08:50

## 2023-01-01 RX ADMIN — ATORVASTATIN CALCIUM 40 MG: 40 TABLET, FILM COATED ORAL at 08:24

## 2023-01-01 RX ADMIN — Medication 2 G: at 11:26

## 2023-01-01 RX ADMIN — Medication 25 MG: at 10:17

## 2023-01-01 RX ADMIN — Medication 1 CAPSULE: at 23:29

## 2023-01-01 RX ADMIN — CEFTAZIDIME 2 G: 2 INJECTION, POWDER, FOR SOLUTION INTRAVENOUS at 20:08

## 2023-01-01 RX ADMIN — Medication 50 MG: at 01:10

## 2023-01-01 RX ADMIN — APIXABAN 5 MG: 2.5 TABLET, FILM COATED ORAL at 09:30

## 2023-01-01 RX ADMIN — Medication 25 MG: at 21:25

## 2023-01-01 RX ADMIN — Medication 1 CAPSULE: at 20:13

## 2023-01-01 RX ADMIN — POLYETHYLENE GLYCOL 3350 17 G: 17 POWDER, FOR SOLUTION ORAL at 08:43

## 2023-01-01 RX ADMIN — FERROUS SULFATE TAB 325 MG (65 MG ELEMENTAL FE) 325 MG: 325 (65 FE) TAB at 11:53

## 2023-01-01 RX ADMIN — SODIUM CHLORIDE: 9 INJECTION, SOLUTION INTRAVENOUS at 18:35

## 2023-01-01 RX ADMIN — CARVEDILOL 6.25 MG: 6.25 TABLET, FILM COATED ORAL at 17:23

## 2023-01-01 RX ADMIN — DICLOFENAC SODIUM 2 G: 10 GEL TOPICAL at 09:20

## 2023-01-01 RX ADMIN — LIRAGLUTIDE 1.8 MG: 6 INJECTION SUBCUTANEOUS at 09:40

## 2023-01-01 RX ADMIN — INSULIN ASPART 1 UNITS: 100 INJECTION, SOLUTION INTRAVENOUS; SUBCUTANEOUS at 18:24

## 2023-01-01 RX ADMIN — DOXYCYCLINE 100 MG: 100 CAPSULE ORAL at 09:14

## 2023-01-01 RX ADMIN — CEFEPIME HYDROCHLORIDE 2 G: 2 INJECTION, POWDER, FOR SOLUTION INTRAVENOUS at 12:08

## 2023-01-01 RX ADMIN — VANCOMYCIN HYDROCHLORIDE 1500 MG: 10 INJECTION, POWDER, LYOPHILIZED, FOR SOLUTION INTRAVENOUS at 20:27

## 2023-01-01 RX ADMIN — APIXABAN 5 MG: 5 TABLET, FILM COATED ORAL at 09:58

## 2023-01-01 RX ADMIN — ACETAMINOPHEN 1000 MG: 500 TABLET ORAL at 09:22

## 2023-01-01 RX ADMIN — Medication 1 CAPSULE: at 09:40

## 2023-01-01 RX ADMIN — INSULIN ASPART 2 UNITS: 100 INJECTION, SOLUTION INTRAVENOUS; SUBCUTANEOUS at 11:46

## 2023-01-01 RX ADMIN — CARVEDILOL 3.12 MG: 3.12 TABLET, FILM COATED ORAL at 08:06

## 2023-01-01 RX ADMIN — APIXABAN 2.5 MG: 2.5 TABLET, FILM COATED ORAL at 09:14

## 2023-01-01 RX ADMIN — Medication 50 MCG: at 14:04

## 2023-01-01 RX ADMIN — METHOCARBAMOL 500 MG: 500 TABLET ORAL at 16:37

## 2023-01-01 RX ADMIN — PIOGLITAZONE HYDROCHLORIDE 45 MG: 45 TABLET ORAL at 08:40

## 2023-01-01 RX ADMIN — APIXABAN 5 MG: 2.5 TABLET, FILM COATED ORAL at 10:58

## 2023-01-01 RX ADMIN — DICLOFENAC SODIUM 2 G: 10 GEL TOPICAL at 12:56

## 2023-01-01 RX ADMIN — Medication 25 MG: at 10:07

## 2023-01-01 RX ADMIN — TRAMADOL HYDROCHLORIDE 50 MG: 50 TABLET, COATED ORAL at 14:26

## 2023-01-01 RX ADMIN — THIAMINE HCL TAB 100 MG 100 MG: 100 TAB at 11:26

## 2023-01-01 RX ADMIN — DICLOFENAC SODIUM 2 G: 10 GEL TOPICAL at 08:16

## 2023-01-01 RX ADMIN — FUROSEMIDE 60 MG: 40 TABLET ORAL at 16:52

## 2023-01-01 RX ADMIN — PIOGLITAZONE HYDROCHLORIDE 45 MG: 45 TABLET ORAL at 08:30

## 2023-01-01 RX ADMIN — Medication 1 CAPSULE: at 11:19

## 2023-01-01 RX ADMIN — Medication 20 MG: at 14:49

## 2023-01-01 RX ADMIN — ACETAMINOPHEN 650 MG: 325 TABLET, FILM COATED ORAL at 20:47

## 2023-01-01 RX ADMIN — Medication 1 CAPSULE: at 21:46

## 2023-01-01 RX ADMIN — DICLOFENAC SODIUM 2 G: 10 GEL TOPICAL at 21:59

## 2023-01-01 RX ADMIN — Medication 2 G: at 16:34

## 2023-01-01 RX ADMIN — LIRAGLUTIDE 1.8 MG: 6 INJECTION SUBCUTANEOUS at 10:26

## 2023-01-01 RX ADMIN — ACETAMINOPHEN 975 MG: 325 TABLET, FILM COATED ORAL at 21:40

## 2023-01-01 RX ADMIN — ACETAMINOPHEN 325MG 975 MG: 325 TABLET ORAL at 03:51

## 2023-01-01 RX ADMIN — Medication 12.5 MG: at 03:33

## 2023-01-01 RX ADMIN — HEPARIN SODIUM 5000 UNITS: 5000 INJECTION, SOLUTION INTRAVENOUS; SUBCUTANEOUS at 20:05

## 2023-01-01 RX ADMIN — PIOGLITAZONE HYDROCHLORIDE 45 MG: 45 TABLET ORAL at 09:52

## 2023-01-01 RX ADMIN — TRAMADOL HYDROCHLORIDE 50 MG: 50 TABLET, COATED ORAL at 13:30

## 2023-01-01 RX ADMIN — APIXABAN 5 MG: 2.5 TABLET, FILM COATED ORAL at 09:12

## 2023-01-01 RX ADMIN — METHOCARBAMOL 500 MG: 500 TABLET ORAL at 21:05

## 2023-01-01 RX ADMIN — MICONAZOLE NITRATE: 20 POWDER TOPICAL at 10:37

## 2023-01-01 RX ADMIN — CARVEDILOL 6.25 MG: 6.25 TABLET, FILM COATED ORAL at 08:18

## 2023-01-01 RX ADMIN — DOCUSATE SODIUM 100 MG: 100 CAPSULE, LIQUID FILLED ORAL at 09:46

## 2023-01-01 RX ADMIN — Medication 1 CAPSULE: at 09:34

## 2023-01-01 RX ADMIN — ACETAMINOPHEN 1000 MG: 500 TABLET ORAL at 20:46

## 2023-01-01 RX ADMIN — METFORMIN ER 500 MG 2000 MG: 500 TABLET ORAL at 10:32

## 2023-01-01 RX ADMIN — MICONAZOLE NITRATE: 20 POWDER TOPICAL at 20:51

## 2023-01-01 RX ADMIN — HEPARIN, PORCINE (PF) 10 UNIT/ML INTRAVENOUS SYRINGE 5 ML: at 11:42

## 2023-01-01 RX ADMIN — HEPARIN, PORCINE (PF) 10 UNIT/ML INTRAVENOUS SYRINGE 5 ML: at 05:51

## 2023-01-01 RX ADMIN — ACETAMINOPHEN 325MG 975 MG: 325 TABLET ORAL at 03:21

## 2023-01-01 RX ADMIN — PIOGLITAZONE HYDROCHLORIDE 45 MG: 45 TABLET ORAL at 08:20

## 2023-01-01 RX ADMIN — ATORVASTATIN CALCIUM 40 MG: 40 TABLET, FILM COATED ORAL at 10:18

## 2023-01-01 RX ADMIN — SENNOSIDES AND DOCUSATE SODIUM 1 TABLET: 8.6; 5 TABLET ORAL at 08:40

## 2023-01-01 RX ADMIN — SENNOSIDES AND DOCUSATE SODIUM 1 TABLET: 8.6; 5 TABLET ORAL at 21:02

## 2023-01-01 RX ADMIN — APIXABAN 5 MG: 2.5 TABLET, FILM COATED ORAL at 20:39

## 2023-01-01 RX ADMIN — ACETAMINOPHEN 975 MG: 325 TABLET, FILM COATED ORAL at 14:42

## 2023-01-01 RX ADMIN — APIXABAN 5 MG: 2.5 TABLET, FILM COATED ORAL at 09:39

## 2023-01-01 RX ADMIN — ATORVASTATIN CALCIUM 40 MG: 40 TABLET, FILM COATED ORAL at 10:58

## 2023-01-01 RX ADMIN — INSULIN ASPART 1 UNITS: 100 INJECTION, SOLUTION INTRAVENOUS; SUBCUTANEOUS at 18:08

## 2023-01-01 RX ADMIN — ACETAMINOPHEN 325MG 975 MG: 325 TABLET ORAL at 04:54

## 2023-01-01 RX ADMIN — TRAMADOL HYDROCHLORIDE 50 MG: 50 TABLET, COATED ORAL at 21:54

## 2023-01-01 RX ADMIN — METHOCARBAMOL 500 MG: 500 TABLET ORAL at 09:59

## 2023-01-01 RX ADMIN — CEFEPIME HYDROCHLORIDE 2 G: 2 INJECTION, POWDER, FOR SOLUTION INTRAVENOUS at 12:28

## 2023-01-01 RX ADMIN — APIXABAN 5 MG: 5 TABLET, FILM COATED ORAL at 19:54

## 2023-01-01 RX ADMIN — CIPROFLOXACIN HYDROCHLORIDE 500 MG: 500 TABLET, FILM COATED ORAL at 21:11

## 2023-01-01 RX ADMIN — LIRAGLUTIDE 1.8 MG: 6 INJECTION SUBCUTANEOUS at 10:38

## 2023-01-01 RX ADMIN — FOLIC ACID 1 MG: 1 TABLET ORAL at 11:29

## 2023-01-01 RX ADMIN — DOXYCYCLINE 100 MG: 100 CAPSULE ORAL at 05:42

## 2023-01-01 RX ADMIN — ACETAMINOPHEN 650 MG: 325 TABLET, FILM COATED ORAL at 16:12

## 2023-01-01 RX ADMIN — ATORVASTATIN CALCIUM 40 MG: 40 TABLET, FILM COATED ORAL at 09:55

## 2023-01-01 RX ADMIN — APIXABAN 5 MG: 2.5 TABLET, FILM COATED ORAL at 10:31

## 2023-01-01 RX ADMIN — ACETAMINOPHEN 1000 MG: 325 TABLET ORAL at 12:57

## 2023-01-01 RX ADMIN — ACETAMINOPHEN 975 MG: 325 TABLET, FILM COATED ORAL at 21:34

## 2023-01-01 RX ADMIN — LIRAGLUTIDE 1.8 MG: 6 INJECTION SUBCUTANEOUS at 10:55

## 2023-01-01 RX ADMIN — ACETAMINOPHEN 650 MG: 325 TABLET, FILM COATED ORAL at 16:42

## 2023-01-01 RX ADMIN — DOXYCYCLINE HYCLATE 100 MG: 100 CAPSULE ORAL at 10:18

## 2023-01-01 RX ADMIN — Medication 1 CAPSULE: at 20:05

## 2023-01-01 RX ADMIN — MAGNESIUM OXIDE TAB 400 MG (241.3 MG ELEMENTAL MG) 400 MG: 400 (241.3 MG) TAB at 20:14

## 2023-01-01 RX ADMIN — Medication 50 MG: at 10:06

## 2023-01-01 RX ADMIN — Medication 1 CAPSULE: at 19:38

## 2023-01-01 RX ADMIN — DICLOFENAC SODIUM 2 G: 10 GEL TOPICAL at 18:33

## 2023-01-01 RX ADMIN — APIXABAN 5 MG: 2.5 TABLET, FILM COATED ORAL at 08:38

## 2023-01-01 RX ADMIN — Medication 1 CAPSULE: at 21:36

## 2023-01-01 RX ADMIN — METFORMIN HYDROCHLORIDE 2000 MG: 500 TABLET, EXTENDED RELEASE ORAL at 10:07

## 2023-01-01 RX ADMIN — Medication 5 ML: at 13:44

## 2023-01-01 RX ADMIN — ATORVASTATIN CALCIUM 40 MG: 40 TABLET, FILM COATED ORAL at 11:32

## 2023-01-01 RX ADMIN — ATORVASTATIN CALCIUM 40 MG: 40 TABLET, FILM COATED ORAL at 10:03

## 2023-01-01 RX ADMIN — ATORVASTATIN CALCIUM 40 MG: 40 TABLET, FILM COATED ORAL at 09:15

## 2023-01-01 RX ADMIN — APIXABAN 5 MG: 2.5 TABLET, FILM COATED ORAL at 08:43

## 2023-01-01 RX ADMIN — METFORMIN ER 500 MG 2000 MG: 500 TABLET ORAL at 09:41

## 2023-01-01 RX ADMIN — Medication 20 MG: at 15:23

## 2023-01-01 RX ADMIN — ACETAMINOPHEN 1000 MG: 500 TABLET ORAL at 09:29

## 2023-01-01 RX ADMIN — Medication 1 CAPSULE: at 09:24

## 2023-01-01 RX ADMIN — ACETAMINOPHEN 325MG 975 MG: 325 TABLET ORAL at 21:14

## 2023-01-01 RX ADMIN — Medication 25 MG: at 20:58

## 2023-01-01 RX ADMIN — ATORVASTATIN CALCIUM 40 MG: 40 TABLET, FILM COATED ORAL at 10:35

## 2023-01-01 RX ADMIN — Medication 2.5 MG: at 06:04

## 2023-01-01 RX ADMIN — DICLOFENAC SODIUM 2 G: 10 GEL TOPICAL at 20:48

## 2023-01-01 RX ADMIN — AMLODIPINE BESYLATE 10 MG: 10 TABLET ORAL at 09:28

## 2023-01-01 RX ADMIN — METFORMIN HYDROCHLORIDE 2000 MG: 500 TABLET, EXTENDED RELEASE ORAL at 08:32

## 2023-01-01 RX ADMIN — Medication 1 CAPSULE: at 08:02

## 2023-01-01 RX ADMIN — APIXABAN 2.5 MG: 2.5 TABLET, FILM COATED ORAL at 09:11

## 2023-01-01 RX ADMIN — PIOGLITAZONE HYDROCHLORIDE 45 MG: 45 TABLET ORAL at 10:14

## 2023-01-01 RX ADMIN — PHENYLEPHRINE HYDROCHLORIDE 100 MCG: 10 INJECTION INTRAVENOUS at 11:21

## 2023-01-01 RX ADMIN — LIRAGLUTIDE 1.8 MG: 6 INJECTION SUBCUTANEOUS at 11:00

## 2023-01-01 RX ADMIN — Medication 12.5 MG: at 20:39

## 2023-01-01 RX ADMIN — Medication 50 MG: at 03:37

## 2023-01-01 RX ADMIN — APIXABAN 2.5 MG: 2.5 TABLET, FILM COATED ORAL at 20:56

## 2023-01-01 RX ADMIN — TRAMADOL HYDROCHLORIDE 50 MG: 50 TABLET, COATED ORAL at 14:24

## 2023-01-01 RX ADMIN — LIRAGLUTIDE 1.8 MG: 6 INJECTION SUBCUTANEOUS at 10:10

## 2023-01-01 RX ADMIN — DOCUSATE SODIUM 100 MG: 100 CAPSULE, LIQUID FILLED ORAL at 09:12

## 2023-01-01 RX ADMIN — CEFTAZIDIME 2 G: 2 INJECTION, POWDER, FOR SOLUTION INTRAVENOUS at 20:45

## 2023-01-01 RX ADMIN — HEPARIN SODIUM 5000 UNITS: 5000 INJECTION, SOLUTION INTRAVENOUS; SUBCUTANEOUS at 08:32

## 2023-01-01 RX ADMIN — APIXABAN 5 MG: 2.5 TABLET, FILM COATED ORAL at 20:12

## 2023-01-01 RX ADMIN — Medication 1 CAPSULE: at 09:07

## 2023-01-01 RX ADMIN — METHOCARBAMOL 500 MG: 500 TABLET ORAL at 08:39

## 2023-01-01 RX ADMIN — Medication 5 MG: at 08:12

## 2023-01-01 RX ADMIN — MULTIPLE VITAMINS W/ MINERALS TAB 1 TABLET: TAB at 12:05

## 2023-01-01 RX ADMIN — SENNOSIDES AND DOCUSATE SODIUM 1 TABLET: 50; 8.6 TABLET ORAL at 19:47

## 2023-01-01 RX ADMIN — METHOCARBAMOL 500 MG: 500 TABLET ORAL at 11:08

## 2023-01-01 RX ADMIN — METHOCARBAMOL 500 MG: 500 TABLET ORAL at 04:05

## 2023-01-01 RX ADMIN — Medication 1 CAPSULE: at 11:07

## 2023-01-01 RX ADMIN — LIRAGLUTIDE 1.8 MG: 6 INJECTION SUBCUTANEOUS at 10:50

## 2023-01-01 RX ADMIN — CARVEDILOL 6.25 MG: 6.25 TABLET, FILM COATED ORAL at 17:16

## 2023-01-01 RX ADMIN — CIPROFLOXACIN HYDROCHLORIDE 500 MG: 500 TABLET, FILM COATED ORAL at 11:32

## 2023-01-01 RX ADMIN — MULTIPLE VITAMINS W/ MINERALS TAB 1 TABLET: TAB at 13:01

## 2023-01-01 RX ADMIN — METHOCARBAMOL 500 MG: 500 TABLET ORAL at 19:05

## 2023-01-01 RX ADMIN — Medication 1 CAPSULE: at 09:05

## 2023-01-01 RX ADMIN — Medication 1 CAPSULE: at 20:28

## 2023-01-01 RX ADMIN — APIXABAN 5 MG: 5 TABLET, FILM COATED ORAL at 23:11

## 2023-01-01 RX ADMIN — Medication 50 MCG: at 11:58

## 2023-01-01 RX ADMIN — DOXYCYCLINE 100 MG: 100 CAPSULE ORAL at 11:32

## 2023-01-01 RX ADMIN — MICONAZOLE NITRATE: 20 POWDER TOPICAL at 20:18

## 2023-01-01 RX ADMIN — ACETAMINOPHEN 650 MG: 325 TABLET, FILM COATED ORAL at 17:28

## 2023-01-01 RX ADMIN — ATORVASTATIN CALCIUM 40 MG: 40 TABLET, FILM COATED ORAL at 09:58

## 2023-01-01 RX ADMIN — METHOCARBAMOL 500 MG: 500 TABLET ORAL at 16:52

## 2023-01-01 RX ADMIN — ACETAMINOPHEN 1000 MG: 325 TABLET ORAL at 10:01

## 2023-01-01 RX ADMIN — SENNOSIDES AND DOCUSATE SODIUM 1 TABLET: 50; 8.6 TABLET ORAL at 20:45

## 2023-01-01 RX ADMIN — POLYETHYLENE GLYCOL 3350 17 G: 17 POWDER, FOR SOLUTION ORAL at 08:49

## 2023-01-01 RX ADMIN — APIXABAN 5 MG: 2.5 TABLET, FILM COATED ORAL at 21:24

## 2023-01-01 RX ADMIN — ATORVASTATIN CALCIUM 40 MG: 40 TABLET, FILM COATED ORAL at 09:32

## 2023-01-01 RX ADMIN — METFORMIN HYDROCHLORIDE 2000 MG: 500 TABLET, EXTENDED RELEASE ORAL at 10:08

## 2023-01-01 RX ADMIN — METHOCARBAMOL 500 MG: 500 TABLET ORAL at 22:10

## 2023-01-01 RX ADMIN — MICONAZOLE NITRATE: 20 POWDER TOPICAL at 20:59

## 2023-01-01 RX ADMIN — DOXYCYCLINE HYCLATE 100 MG: 100 CAPSULE ORAL at 08:57

## 2023-01-01 RX ADMIN — THIAMINE HCL TAB 100 MG 100 MG: 100 TAB at 11:20

## 2023-01-01 RX ADMIN — CIPROFLOXACIN HYDROCHLORIDE 500 MG: 500 TABLET, FILM COATED ORAL at 20:14

## 2023-01-01 RX ADMIN — Medication 1 CAPSULE: at 20:16

## 2023-01-01 RX ADMIN — Medication 1 CAPSULE: at 06:42

## 2023-01-01 RX ADMIN — INSULIN ASPART 1 UNITS: 100 INJECTION, SOLUTION INTRAVENOUS; SUBCUTANEOUS at 12:20

## 2023-01-01 RX ADMIN — TRAMADOL HYDROCHLORIDE 50 MG: 50 TABLET, COATED ORAL at 05:32

## 2023-01-01 RX ADMIN — ACETAMINOPHEN 325MG 975 MG: 325 TABLET ORAL at 12:18

## 2023-01-01 RX ADMIN — DEXTROSE MONOHYDRATE: 50 INJECTION, SOLUTION INTRAVENOUS at 15:47

## 2023-01-01 RX ADMIN — METHOCARBAMOL 500 MG: 500 TABLET ORAL at 20:31

## 2023-01-01 RX ADMIN — MICONAZOLE NITRATE: 20 POWDER TOPICAL at 09:18

## 2023-01-01 RX ADMIN — METHOCARBAMOL 500 MG: 500 TABLET ORAL at 12:07

## 2023-01-01 RX ADMIN — Medication 1 CAPSULE: at 08:57

## 2023-01-01 RX ADMIN — THIAMINE HCL TAB 100 MG 100 MG: 100 TAB at 08:58

## 2023-01-01 RX ADMIN — ACETAMINOPHEN 325MG 975 MG: 325 TABLET ORAL at 18:23

## 2023-01-01 RX ADMIN — APIXABAN 5 MG: 2.5 TABLET, FILM COATED ORAL at 08:40

## 2023-01-01 RX ADMIN — ACETAMINOPHEN 975 MG: 325 TABLET, FILM COATED ORAL at 21:44

## 2023-01-01 RX ADMIN — ACETAMINOPHEN 325MG 975 MG: 325 TABLET ORAL at 23:28

## 2023-01-01 RX ADMIN — METHOCARBAMOL 500 MG: 500 TABLET ORAL at 10:06

## 2023-01-01 RX ADMIN — DOCUSATE SODIUM 100 MG: 100 CAPSULE, LIQUID FILLED ORAL at 08:12

## 2023-01-01 RX ADMIN — MICONAZOLE NITRATE: 20 POWDER TOPICAL at 22:05

## 2023-01-01 RX ADMIN — Medication 25 MG: at 09:22

## 2023-01-01 RX ADMIN — MICONAZOLE NITRATE: 20 POWDER TOPICAL at 10:14

## 2023-01-01 RX ADMIN — FENTANYL CITRATE 25 MCG: 50 INJECTION, SOLUTION INTRAMUSCULAR; INTRAVENOUS at 11:41

## 2023-01-01 RX ADMIN — APIXABAN 5 MG: 2.5 TABLET, FILM COATED ORAL at 11:08

## 2023-01-01 RX ADMIN — AMLODIPINE BESYLATE 10 MG: 10 TABLET ORAL at 08:01

## 2023-01-01 RX ADMIN — METFORMIN HYDROCHLORIDE 2000 MG: 500 TABLET, EXTENDED RELEASE ORAL at 10:06

## 2023-01-01 RX ADMIN — MICONAZOLE NITRATE: 20 POWDER TOPICAL at 09:14

## 2023-01-01 RX ADMIN — PIOGLITAZONE HYDROCHLORIDE 45 MG: 45 TABLET ORAL at 10:13

## 2023-01-01 RX ADMIN — ACETAMINOPHEN 1000 MG: 325 TABLET ORAL at 10:50

## 2023-01-01 RX ADMIN — DICLOFENAC SODIUM 2 G: 10 GEL TOPICAL at 09:58

## 2023-01-01 RX ADMIN — MICONAZOLE NITRATE: 20 POWDER TOPICAL at 09:09

## 2023-01-01 RX ADMIN — APIXABAN 5 MG: 2.5 TABLET, FILM COATED ORAL at 20:20

## 2023-01-01 RX ADMIN — MICONAZOLE NITRATE: 20 POWDER TOPICAL at 09:28

## 2023-01-01 RX ADMIN — PIOGLITAZONE HYDROCHLORIDE 45 MG: 45 TABLET ORAL at 09:15

## 2023-01-01 RX ADMIN — VANCOMYCIN HYDROCHLORIDE 1500 MG: 10 INJECTION, POWDER, LYOPHILIZED, FOR SOLUTION INTRAVENOUS at 11:00

## 2023-01-01 RX ADMIN — LIRAGLUTIDE 1.8 MG: 6 INJECTION SUBCUTANEOUS at 09:21

## 2023-01-01 RX ADMIN — TRAMADOL HYDROCHLORIDE 50 MG: 50 TABLET, COATED ORAL at 12:31

## 2023-01-01 RX ADMIN — ATORVASTATIN CALCIUM 40 MG: 40 TABLET, FILM COATED ORAL at 08:49

## 2023-01-01 RX ADMIN — LIDOCAINE PATCH 4% 1 PATCH: 40 PATCH TOPICAL at 09:17

## 2023-01-01 RX ADMIN — APIXABAN 5 MG: 5 TABLET, FILM COATED ORAL at 21:12

## 2023-01-01 RX ADMIN — FUROSEMIDE 60 MG: 40 TABLET ORAL at 09:58

## 2023-01-01 RX ADMIN — ATORVASTATIN CALCIUM 40 MG: 40 TABLET, FILM COATED ORAL at 09:19

## 2023-01-01 RX ADMIN — MULTIPLE VITAMINS W/ MINERALS TAB 1 TABLET: TAB at 11:47

## 2023-01-01 RX ADMIN — Medication 1 CAPSULE: at 18:29

## 2023-01-01 RX ADMIN — CEFEPIME HYDROCHLORIDE 2 G: 2 INJECTION, POWDER, FOR SOLUTION INTRAVENOUS at 01:37

## 2023-01-01 RX ADMIN — Medication 5 ML: at 16:46

## 2023-01-01 RX ADMIN — THIAMINE HCL TAB 100 MG 100 MG: 100 TAB at 09:22

## 2023-01-01 RX ADMIN — PIOGLITAZONE HYDROCHLORIDE 45 MG: 45 TABLET ORAL at 09:18

## 2023-01-01 RX ADMIN — FUROSEMIDE 20 MG: 20 TABLET ORAL at 10:14

## 2023-01-01 RX ADMIN — Medication 25 MG: at 08:44

## 2023-01-01 RX ADMIN — ACETAMINOPHEN 325MG 975 MG: 325 TABLET ORAL at 14:23

## 2023-01-01 RX ADMIN — ATORVASTATIN CALCIUM 40 MG: 40 TABLET, FILM COATED ORAL at 09:51

## 2023-01-01 RX ADMIN — ATORVASTATIN CALCIUM 40 MG: 40 TABLET, FILM COATED ORAL at 10:09

## 2023-01-01 RX ADMIN — DICLOFENAC SODIUM 2 G: 10 GEL TOPICAL at 11:54

## 2023-01-01 RX ADMIN — TRAMADOL HYDROCHLORIDE 50 MG: 50 TABLET, COATED ORAL at 02:53

## 2023-01-01 RX ADMIN — APIXABAN 5 MG: 5 TABLET, FILM COATED ORAL at 20:46

## 2023-01-01 RX ADMIN — Medication 2 G: at 10:36

## 2023-01-01 RX ADMIN — AMLODIPINE BESYLATE 5 MG: 5 TABLET ORAL at 08:56

## 2023-01-01 RX ADMIN — ACETAMINOPHEN 650 MG: 325 TABLET, FILM COATED ORAL at 14:14

## 2023-01-01 RX ADMIN — APIXABAN 2.5 MG: 2.5 TABLET, FILM COATED ORAL at 09:54

## 2023-01-01 RX ADMIN — Medication 1 CAPSULE: at 20:14

## 2023-01-01 RX ADMIN — CEFTAZIDIME 2 G: 2 INJECTION, POWDER, FOR SOLUTION INTRAVENOUS at 21:05

## 2023-01-01 RX ADMIN — CEFEPIME HYDROCHLORIDE 2 G: 2 INJECTION, POWDER, FOR SOLUTION INTRAVENOUS at 12:23

## 2023-01-01 RX ADMIN — LIRAGLUTIDE 1.8 MG: 6 INJECTION SUBCUTANEOUS at 08:55

## 2023-01-01 RX ADMIN — POLYETHYLENE GLYCOL 3350 17 G: 17 POWDER, FOR SOLUTION ORAL at 08:57

## 2023-01-01 RX ADMIN — FOLIC ACID 1 MG: 1 TABLET ORAL at 08:42

## 2023-01-01 RX ADMIN — MICONAZOLE NITRATE: 20 POWDER TOPICAL at 20:19

## 2023-01-01 RX ADMIN — PIOGLITAZONE HYDROCHLORIDE 45 MG: 45 TABLET ORAL at 09:39

## 2023-01-01 RX ADMIN — CIPROFLOXACIN HYDROCHLORIDE 500 MG: 500 TABLET, FILM COATED ORAL at 17:24

## 2023-01-01 RX ADMIN — MICONAZOLE NITRATE: 20 POWDER TOPICAL at 08:58

## 2023-01-01 RX ADMIN — MICONAZOLE NITRATE: 20 POWDER TOPICAL at 22:42

## 2023-01-01 RX ADMIN — METHOCARBAMOL 500 MG: 500 TABLET ORAL at 08:49

## 2023-01-01 RX ADMIN — DOXYCYCLINE HYCLATE 100 MG: 100 CAPSULE ORAL at 20:41

## 2023-01-01 RX ADMIN — METHOCARBAMOL 500 MG: 500 TABLET ORAL at 21:16

## 2023-01-01 RX ADMIN — LIRAGLUTIDE 1.8 MG: 6 INJECTION SUBCUTANEOUS at 08:15

## 2023-01-01 RX ADMIN — PIOGLITAZONE HYDROCHLORIDE 45 MG: 45 TABLET ORAL at 08:17

## 2023-01-01 RX ADMIN — APIXABAN 2.5 MG: 2.5 TABLET, FILM COATED ORAL at 21:59

## 2023-01-01 RX ADMIN — METHOCARBAMOL 500 MG: 500 TABLET ORAL at 08:54

## 2023-01-01 RX ADMIN — INSULIN ASPART 1 UNITS: 100 INJECTION, SOLUTION INTRAVENOUS; SUBCUTANEOUS at 18:30

## 2023-01-01 RX ADMIN — METHOCARBAMOL 500 MG: 500 TABLET ORAL at 12:17

## 2023-01-01 RX ADMIN — ACETAMINOPHEN 1000 MG: 500 TABLET ORAL at 13:32

## 2023-01-01 RX ADMIN — PIOGLITAZONE HYDROCHLORIDE 45 MG: 45 TABLET ORAL at 10:07

## 2023-01-01 RX ADMIN — APIXABAN 5 MG: 2.5 TABLET, FILM COATED ORAL at 20:55

## 2023-01-01 RX ADMIN — OXYCODONE HYDROCHLORIDE 10 MG: 5 TABLET ORAL at 08:54

## 2023-01-01 RX ADMIN — Medication 2 G: at 20:53

## 2023-01-01 RX ADMIN — APIXABAN 5 MG: 2.5 TABLET, FILM COATED ORAL at 10:42

## 2023-01-01 RX ADMIN — Medication 50 MCG: at 11:29

## 2023-01-01 RX ADMIN — ACETAMINOPHEN 975 MG: 325 TABLET, FILM COATED ORAL at 14:58

## 2023-01-01 RX ADMIN — ACETAMINOPHEN 325MG 975 MG: 325 TABLET ORAL at 05:46

## 2023-01-01 RX ADMIN — Medication 5 MG: at 20:17

## 2023-01-01 RX ADMIN — APIXABAN 5 MG: 2.5 TABLET, FILM COATED ORAL at 08:48

## 2023-01-01 RX ADMIN — METHOCARBAMOL 500 MG: 500 TABLET ORAL at 08:33

## 2023-01-01 RX ADMIN — FOLIC ACID 1 MG: 1 TABLET ORAL at 08:48

## 2023-01-01 RX ADMIN — METHOCARBAMOL 500 MG: 500 TABLET ORAL at 20:28

## 2023-01-01 RX ADMIN — AMLODIPINE BESYLATE 10 MG: 10 TABLET ORAL at 10:18

## 2023-01-01 RX ADMIN — Medication 25 MG: at 09:29

## 2023-01-01 RX ADMIN — Medication 50 MG: at 21:31

## 2023-01-01 RX ADMIN — PIOGLITAZONE HYDROCHLORIDE 45 MG: 45 TABLET ORAL at 10:20

## 2023-01-01 RX ADMIN — METHOCARBAMOL 500 MG: 500 TABLET ORAL at 16:53

## 2023-01-01 RX ADMIN — METHOCARBAMOL 500 MG: 500 TABLET ORAL at 16:42

## 2023-01-01 RX ADMIN — APIXABAN 2.5 MG: 2.5 TABLET, FILM COATED ORAL at 20:36

## 2023-01-01 RX ADMIN — ACETAMINOPHEN 325MG 975 MG: 325 TABLET ORAL at 13:33

## 2023-01-01 RX ADMIN — METFORMIN ER 500 MG 2000 MG: 500 TABLET ORAL at 08:34

## 2023-01-01 RX ADMIN — METHOCARBAMOL 500 MG: 500 TABLET ORAL at 14:45

## 2023-01-01 RX ADMIN — FENTANYL CITRATE 50 MCG: 50 INJECTION, SOLUTION INTRAMUSCULAR; INTRAVENOUS at 18:21

## 2023-01-01 RX ADMIN — HEPARIN, PORCINE (PF) 10 UNIT/ML INTRAVENOUS SYRINGE 5 ML: at 18:48

## 2023-01-01 RX ADMIN — ACETAMINOPHEN 1000 MG: 500 TABLET ORAL at 08:42

## 2023-01-01 RX ADMIN — MICONAZOLE NITRATE: 20 POWDER TOPICAL at 10:01

## 2023-01-01 RX ADMIN — METHOCARBAMOL 500 MG: 500 TABLET ORAL at 09:48

## 2023-01-01 RX ADMIN — APIXABAN 5 MG: 5 TABLET, FILM COATED ORAL at 09:02

## 2023-01-01 RX ADMIN — Medication 25 MG: at 22:16

## 2023-01-01 RX ADMIN — METFORMIN ER 500 MG 2000 MG: 500 TABLET ORAL at 10:07

## 2023-01-01 RX ADMIN — Medication 25 MG: at 16:05

## 2023-01-01 RX ADMIN — ACETAMINOPHEN 650 MG: 325 TABLET, FILM COATED ORAL at 06:17

## 2023-01-01 RX ADMIN — ACETAMINOPHEN 325MG 975 MG: 325 TABLET ORAL at 18:22

## 2023-01-01 RX ADMIN — CARVEDILOL 6.25 MG: 3.12 TABLET, FILM COATED ORAL at 08:58

## 2023-01-01 RX ADMIN — PIOGLITAZONE HYDROCHLORIDE 45 MG: 45 TABLET ORAL at 10:52

## 2023-01-01 RX ADMIN — MULTIPLE VITAMINS W/ MINERALS TAB 1 TABLET: TAB at 12:31

## 2023-01-01 RX ADMIN — AMLODIPINE BESYLATE 5 MG: 5 TABLET ORAL at 07:36

## 2023-01-01 RX ADMIN — Medication 1 CAPSULE: at 19:48

## 2023-01-01 RX ADMIN — Medication 1 CAPSULE: at 10:32

## 2023-01-01 RX ADMIN — Medication 1 CAPSULE: at 20:04

## 2023-01-01 RX ADMIN — MAGNESIUM OXIDE TAB 400 MG (241.3 MG ELEMENTAL MG) 400 MG: 400 (241.3 MG) TAB at 20:18

## 2023-01-01 RX ADMIN — Medication 25 MG: at 04:07

## 2023-01-01 RX ADMIN — METHOCARBAMOL 500 MG: 500 TABLET ORAL at 21:02

## 2023-01-01 RX ADMIN — METFORMIN ER 500 MG 2000 MG: 500 TABLET ORAL at 08:51

## 2023-01-01 RX ADMIN — Medication 1 CAPSULE: at 21:05

## 2023-01-01 RX ADMIN — APIXABAN 5 MG: 2.5 TABLET, FILM COATED ORAL at 09:14

## 2023-01-01 RX ADMIN — LIRAGLUTIDE 1.8 MG: 6 INJECTION SUBCUTANEOUS at 10:58

## 2023-01-01 RX ADMIN — ATORVASTATIN CALCIUM 40 MG: 40 TABLET, FILM COATED ORAL at 08:56

## 2023-01-01 RX ADMIN — APIXABAN 5 MG: 5 TABLET, FILM COATED ORAL at 08:24

## 2023-01-01 RX ADMIN — METFORMIN ER 500 MG 2000 MG: 500 TABLET ORAL at 09:43

## 2023-01-01 RX ADMIN — AMLODIPINE BESYLATE 10 MG: 10 TABLET ORAL at 10:06

## 2023-01-01 RX ADMIN — FENTANYL CITRATE 50 MCG: 50 INJECTION, SOLUTION INTRAMUSCULAR; INTRAVENOUS at 10:08

## 2023-01-01 RX ADMIN — THIAMINE HCL TAB 100 MG 100 MG: 100 TAB at 08:57

## 2023-01-01 RX ADMIN — ATORVASTATIN CALCIUM 40 MG: 40 TABLET, FILM COATED ORAL at 08:53

## 2023-01-01 RX ADMIN — LIRAGLUTIDE 1.8 MG: 6 INJECTION SUBCUTANEOUS at 09:53

## 2023-01-01 RX ADMIN — CIPROFLOXACIN HYDROCHLORIDE 500 MG: 500 TABLET, FILM COATED ORAL at 20:01

## 2023-01-01 RX ADMIN — Medication 2 G: at 08:28

## 2023-01-01 RX ADMIN — LIDOCAINE PATCH 4% 1 PATCH: 40 PATCH TOPICAL at 18:54

## 2023-01-01 RX ADMIN — CARVEDILOL 6.25 MG: 3.12 TABLET, FILM COATED ORAL at 17:36

## 2023-01-01 RX ADMIN — Medication 1 CAPSULE: at 05:46

## 2023-01-01 RX ADMIN — MAGNESIUM OXIDE TAB 400 MG (241.3 MG ELEMENTAL MG) 400 MG: 400 (241.3 MG) TAB at 08:49

## 2023-01-01 RX ADMIN — DICLOFENAC SODIUM 2 G: 10 GEL TOPICAL at 19:45

## 2023-01-01 RX ADMIN — DOXYCYCLINE HYCLATE 100 MG: 100 CAPSULE ORAL at 21:02

## 2023-01-01 RX ADMIN — PIOGLITAZONE HYDROCHLORIDE 45 MG: 45 TABLET ORAL at 10:18

## 2023-01-01 RX ADMIN — HEPARIN, PORCINE (PF) 10 UNIT/ML INTRAVENOUS SYRINGE 5 ML: at 00:35

## 2023-01-01 RX ADMIN — DOXYCYCLINE 100 MG: 100 CAPSULE ORAL at 20:04

## 2023-01-01 RX ADMIN — POLYETHYLENE GLYCOL 3350 17 G: 17 POWDER, FOR SOLUTION ORAL at 09:18

## 2023-01-01 RX ADMIN — ACETAMINOPHEN 650 MG: 325 TABLET ORAL at 09:46

## 2023-01-01 RX ADMIN — METHOCARBAMOL 500 MG: 500 TABLET ORAL at 09:42

## 2023-01-01 RX ADMIN — OXYCODONE HYDROCHLORIDE 10 MG: 5 TABLET ORAL at 14:31

## 2023-01-01 RX ADMIN — Medication 1 CAPSULE: at 20:22

## 2023-01-01 RX ADMIN — MAGNESIUM SULFATE HEPTAHYDRATE 4 G: 40 INJECTION, SOLUTION INTRAVENOUS at 16:28

## 2023-01-01 RX ADMIN — LIRAGLUTIDE 1.8 MG: 6 INJECTION SUBCUTANEOUS at 09:44

## 2023-01-01 RX ADMIN — METFORMIN ER 500 MG 2000 MG: 500 TABLET ORAL at 11:06

## 2023-01-01 RX ADMIN — METHOCARBAMOL 500 MG: 500 TABLET ORAL at 13:07

## 2023-01-01 RX ADMIN — Medication 25 MG: at 20:52

## 2023-01-01 RX ADMIN — APIXABAN 5 MG: 2.5 TABLET, FILM COATED ORAL at 11:23

## 2023-01-01 RX ADMIN — LIDOCAINE HYDROCHLORIDE 4 ML: 10 INJECTION, SOLUTION EPIDURAL; INFILTRATION; INTRACAUDAL; PERINEURAL at 13:26

## 2023-01-01 RX ADMIN — MULTIPLE VITAMINS W/ MINERALS TAB 1 TABLET: TAB at 11:53

## 2023-01-01 RX ADMIN — PIOGLITAZONE HYDROCHLORIDE 45 MG: 45 TABLET ORAL at 11:09

## 2023-01-01 RX ADMIN — MICONAZOLE NITRATE: 20 POWDER TOPICAL at 09:35

## 2023-01-01 RX ADMIN — CIPROFLOXACIN HYDROCHLORIDE 500 MG: 500 TABLET, FILM COATED ORAL at 21:28

## 2023-01-01 RX ADMIN — METFORMIN ER 500 MG 2000 MG: 500 TABLET ORAL at 08:20

## 2023-01-01 RX ADMIN — APIXABAN 5 MG: 2.5 TABLET, FILM COATED ORAL at 09:44

## 2023-01-01 RX ADMIN — Medication 1 CAPSULE: at 22:19

## 2023-01-01 RX ADMIN — DOXYCYCLINE 100 MG: 100 CAPSULE ORAL at 21:28

## 2023-01-01 RX ADMIN — APIXABAN 5 MG: 2.5 TABLET, FILM COATED ORAL at 10:00

## 2023-01-01 RX ADMIN — INSULIN ASPART 1 UNITS: 100 INJECTION, SOLUTION INTRAVENOUS; SUBCUTANEOUS at 17:15

## 2023-01-01 RX ADMIN — ACETAMINOPHEN 650 MG: 325 TABLET, FILM COATED ORAL at 10:07

## 2023-01-01 RX ADMIN — APIXABAN 2.5 MG: 2.5 TABLET, FILM COATED ORAL at 21:47

## 2023-01-01 RX ADMIN — TRAMADOL HYDROCHLORIDE 50 MG: 50 TABLET, COATED ORAL at 22:55

## 2023-01-01 RX ADMIN — PIOGLITAZONE HYDROCHLORIDE 45 MG: 45 TABLET ORAL at 08:50

## 2023-01-01 RX ADMIN — ACETAMINOPHEN 325MG 975 MG: 325 TABLET ORAL at 15:38

## 2023-01-01 RX ADMIN — METFORMIN HYDROCHLORIDE 2000 MG: 500 TABLET, EXTENDED RELEASE ORAL at 08:52

## 2023-01-01 RX ADMIN — THIAMINE HCL TAB 100 MG 100 MG: 100 TAB at 09:15

## 2023-01-01 RX ADMIN — APIXABAN 2.5 MG: 2.5 TABLET, FILM COATED ORAL at 09:42

## 2023-01-01 RX ADMIN — SENNOSIDES AND DOCUSATE SODIUM 1 TABLET: 8.6; 5 TABLET ORAL at 09:45

## 2023-01-01 RX ADMIN — ACETAMINOPHEN 325MG 975 MG: 325 TABLET ORAL at 14:45

## 2023-01-01 RX ADMIN — Medication 1 CAPSULE: at 23:13

## 2023-01-01 RX ADMIN — TRAMADOL HYDROCHLORIDE 50 MG: 50 TABLET, COATED ORAL at 12:48

## 2023-01-01 RX ADMIN — HYDROMORPHONE HYDROCHLORIDE 0.5 MG: 1 INJECTION, SOLUTION INTRAMUSCULAR; INTRAVENOUS; SUBCUTANEOUS at 14:38

## 2023-01-01 RX ADMIN — NAPROXEN 500 MG: 250 TABLET ORAL at 18:47

## 2023-01-01 RX ADMIN — CEFTAZIDIME 2 G: 2 INJECTION, POWDER, FOR SOLUTION INTRAVENOUS at 20:12

## 2023-01-01 RX ADMIN — APIXABAN 5 MG: 2.5 TABLET, FILM COATED ORAL at 09:59

## 2023-01-01 RX ADMIN — Medication 1 CAPSULE: at 19:55

## 2023-01-01 RX ADMIN — SENNOSIDES AND DOCUSATE SODIUM 1 TABLET: 8.6; 5 TABLET ORAL at 20:16

## 2023-01-01 RX ADMIN — METFORMIN HYDROCHLORIDE 2000 MG: 500 TABLET, EXTENDED RELEASE ORAL at 08:27

## 2023-01-01 RX ADMIN — SODIUM CHLORIDE: 9 INJECTION, SOLUTION INTRAVENOUS at 08:26

## 2023-01-01 RX ADMIN — APIXABAN 5 MG: 2.5 TABLET, FILM COATED ORAL at 10:51

## 2023-01-01 RX ADMIN — CARVEDILOL 6.25 MG: 3.12 TABLET, FILM COATED ORAL at 08:59

## 2023-01-01 RX ADMIN — POLYETHYLENE GLYCOL 3350 17 G: 17 POWDER, FOR SOLUTION ORAL at 08:24

## 2023-01-01 RX ADMIN — ATORVASTATIN CALCIUM 40 MG: 40 TABLET, FILM COATED ORAL at 08:29

## 2023-01-01 RX ADMIN — ACETAMINOPHEN 1000 MG: 325 TABLET ORAL at 21:31

## 2023-01-01 RX ADMIN — TRAMADOL HYDROCHLORIDE 50 MG: 50 TABLET, COATED ORAL at 08:11

## 2023-01-01 RX ADMIN — TRAMADOL HYDROCHLORIDE 50 MG: 50 TABLET, COATED ORAL at 03:26

## 2023-01-01 RX ADMIN — Medication 1 CAPSULE: at 20:26

## 2023-01-01 RX ADMIN — ACETAMINOPHEN 650 MG: 325 TABLET, FILM COATED ORAL at 21:18

## 2023-01-01 RX ADMIN — SENNOSIDES AND DOCUSATE SODIUM 1 TABLET: 50; 8.6 TABLET ORAL at 21:14

## 2023-01-01 RX ADMIN — Medication 1 CAPSULE: at 20:27

## 2023-01-01 RX ADMIN — APIXABAN 5 MG: 2.5 TABLET, FILM COATED ORAL at 09:05

## 2023-01-01 RX ADMIN — LIRAGLUTIDE 1.8 MG: 6 INJECTION SUBCUTANEOUS at 10:39

## 2023-01-01 RX ADMIN — HEPARIN, PORCINE (PF) 10 UNIT/ML INTRAVENOUS SYRINGE 5 ML: at 21:46

## 2023-01-01 RX ADMIN — APIXABAN 5 MG: 2.5 TABLET, FILM COATED ORAL at 20:05

## 2023-01-01 RX ADMIN — PIOGLITAZONE HYDROCHLORIDE 45 MG: 45 TABLET ORAL at 10:57

## 2023-01-01 RX ADMIN — OXYCODONE HYDROCHLORIDE 10 MG: 5 TABLET ORAL at 22:34

## 2023-01-01 RX ADMIN — ACETAMINOPHEN 325MG 975 MG: 325 TABLET ORAL at 16:25

## 2023-01-01 RX ADMIN — DEXTROSE MONOHYDRATE: 50 INJECTION, SOLUTION INTRAVENOUS at 05:32

## 2023-01-01 RX ADMIN — AMLODIPINE BESYLATE 10 MG: 10 TABLET ORAL at 08:37

## 2023-01-01 RX ADMIN — LIRAGLUTIDE 1.8 MG: 6 INJECTION SUBCUTANEOUS at 08:32

## 2023-01-01 RX ADMIN — LIRAGLUTIDE 1.8 MG: 6 INJECTION SUBCUTANEOUS at 09:03

## 2023-01-01 RX ADMIN — APIXABAN 5 MG: 5 TABLET, FILM COATED ORAL at 22:35

## 2023-01-01 RX ADMIN — APIXABAN 2.5 MG: 2.5 TABLET, FILM COATED ORAL at 20:12

## 2023-01-01 RX ADMIN — Medication 1 CAPSULE: at 10:38

## 2023-01-01 RX ADMIN — Medication 1 CAPSULE: at 13:09

## 2023-01-01 RX ADMIN — HEPARIN SODIUM 5000 UNITS: 5000 INJECTION, SOLUTION INTRAVENOUS; SUBCUTANEOUS at 09:04

## 2023-01-01 RX ADMIN — FERROUS SULFATE TAB 325 MG (65 MG ELEMENTAL FE) 325 MG: 325 (65 FE) TAB at 13:30

## 2023-01-01 RX ADMIN — INSULIN ASPART 1 UNITS: 100 INJECTION, SOLUTION INTRAVENOUS; SUBCUTANEOUS at 14:09

## 2023-01-01 RX ADMIN — Medication 1 CAPSULE: at 08:45

## 2023-01-01 RX ADMIN — ACETAMINOPHEN 650 MG: 325 TABLET, FILM COATED ORAL at 02:53

## 2023-01-01 RX ADMIN — ATORVASTATIN CALCIUM 40 MG: 40 TABLET, FILM COATED ORAL at 09:05

## 2023-01-01 RX ADMIN — Medication 25 MG: at 10:10

## 2023-01-01 RX ADMIN — TRAMADOL HYDROCHLORIDE 50 MG: 50 TABLET, COATED ORAL at 14:03

## 2023-01-01 RX ADMIN — ACETAMINOPHEN 1000 MG: 500 TABLET ORAL at 09:13

## 2023-01-01 RX ADMIN — APIXABAN 5 MG: 5 TABLET, FILM COATED ORAL at 20:36

## 2023-01-01 RX ADMIN — MULTIPLE VITAMINS W/ MINERALS TAB 1 TABLET: TAB at 15:11

## 2023-01-01 RX ADMIN — OXYCODONE HYDROCHLORIDE 10 MG: 5 TABLET ORAL at 20:26

## 2023-01-01 RX ADMIN — ATORVASTATIN CALCIUM 40 MG: 40 TABLET, FILM COATED ORAL at 11:21

## 2023-01-01 RX ADMIN — ACETAMINOPHEN 650 MG: 325 TABLET, FILM COATED ORAL at 20:26

## 2023-01-01 RX ADMIN — Medication 1 CAPSULE: at 19:47

## 2023-01-01 RX ADMIN — Medication 1 CAPSULE: at 11:08

## 2023-01-01 RX ADMIN — DOXYCYCLINE 100 MG: 100 CAPSULE ORAL at 17:58

## 2023-01-01 RX ADMIN — DOCUSATE SODIUM 100 MG: 100 CAPSULE, LIQUID FILLED ORAL at 08:47

## 2023-01-01 RX ADMIN — PIOGLITAZONE HYDROCHLORIDE 45 MG: 45 TABLET ORAL at 09:13

## 2023-01-01 RX ADMIN — ATORVASTATIN CALCIUM 40 MG: 40 TABLET, FILM COATED ORAL at 10:37

## 2023-01-01 RX ADMIN — MICONAZOLE NITRATE: 20 POWDER TOPICAL at 20:25

## 2023-01-01 RX ADMIN — MICONAZOLE NITRATE: 20 POWDER TOPICAL at 09:56

## 2023-01-01 RX ADMIN — LIRAGLUTIDE 1.8 MG: 6 INJECTION SUBCUTANEOUS at 10:08

## 2023-01-01 RX ADMIN — Medication 1 CAPSULE: at 08:56

## 2023-01-01 RX ADMIN — CARVEDILOL 6.25 MG: 3.12 TABLET, FILM COATED ORAL at 11:07

## 2023-01-01 RX ADMIN — POLYETHYLENE GLYCOL 3350 17 G: 17 POWDER, FOR SOLUTION ORAL at 20:18

## 2023-01-01 RX ADMIN — ACETAMINOPHEN 650 MG: 325 TABLET, FILM COATED ORAL at 20:27

## 2023-01-01 RX ADMIN — OXYCODONE HYDROCHLORIDE 5 MG: 5 TABLET ORAL at 17:29

## 2023-01-01 RX ADMIN — FOLIC ACID 1 MG: 1 TABLET ORAL at 10:37

## 2023-01-01 RX ADMIN — THIAMINE HCL TAB 100 MG 100 MG: 100 TAB at 11:34

## 2023-01-01 RX ADMIN — ACETAMINOPHEN 650 MG: 325 TABLET, FILM COATED ORAL at 22:25

## 2023-01-01 RX ADMIN — METFORMIN ER 500 MG 2000 MG: 500 TABLET ORAL at 09:29

## 2023-01-01 RX ADMIN — INSULIN ASPART 1 UNITS: 100 INJECTION, SOLUTION INTRAVENOUS; SUBCUTANEOUS at 12:56

## 2023-01-01 RX ADMIN — CARVEDILOL 6.25 MG: 6.25 TABLET, FILM COATED ORAL at 17:55

## 2023-01-01 RX ADMIN — CARVEDILOL 6.25 MG: 3.12 TABLET, FILM COATED ORAL at 08:36

## 2023-01-01 RX ADMIN — MICONAZOLE NITRATE: 20 POWDER TOPICAL at 21:13

## 2023-01-01 RX ADMIN — CIPROFLOXACIN HYDROCHLORIDE 500 MG: 500 TABLET, FILM COATED ORAL at 19:09

## 2023-01-01 RX ADMIN — Medication 25 MG: at 08:42

## 2023-01-01 RX ADMIN — METFORMIN ER 500 MG 2000 MG: 500 TABLET ORAL at 11:01

## 2023-01-01 RX ADMIN — ATORVASTATIN CALCIUM 40 MG: 40 TABLET, FILM COATED ORAL at 10:04

## 2023-01-01 RX ADMIN — MELATONIN TAB 3 MG 3 MG: 3 TAB at 21:17

## 2023-01-01 RX ADMIN — METHOCARBAMOL 500 MG: 500 TABLET ORAL at 09:19

## 2023-01-01 RX ADMIN — HEPARIN, PORCINE (PF) 10 UNIT/ML INTRAVENOUS SYRINGE 5 ML: at 12:03

## 2023-01-01 RX ADMIN — FERROUS SULFATE TAB 325 MG (65 MG ELEMENTAL FE) 325 MG: 325 (65 FE) TAB at 14:06

## 2023-01-01 RX ADMIN — Medication 25 MG: at 04:20

## 2023-01-01 RX ADMIN — DICLOFENAC SODIUM 2 G: 10 GEL TOPICAL at 15:40

## 2023-01-01 RX ADMIN — ACETAMINOPHEN 975 MG: 325 TABLET, FILM COATED ORAL at 21:06

## 2023-01-01 RX ADMIN — ACETAMINOPHEN 650 MG: 325 TABLET, FILM COATED ORAL at 04:05

## 2023-01-01 RX ADMIN — CARVEDILOL 6.25 MG: 3.12 TABLET, FILM COATED ORAL at 08:56

## 2023-01-01 RX ADMIN — APIXABAN 5 MG: 2.5 TABLET, FILM COATED ORAL at 08:44

## 2023-01-01 RX ADMIN — Medication 3 MG: at 21:49

## 2023-01-01 RX ADMIN — MAGNESIUM OXIDE TAB 400 MG (241.3 MG ELEMENTAL MG) 400 MG: 400 (241.3 MG) TAB at 10:21

## 2023-01-01 RX ADMIN — LIDOCAINE HYDROCHLORIDE 3 ML: 10 INJECTION, SOLUTION EPIDURAL; INFILTRATION; INTRACAUDAL; PERINEURAL at 09:24

## 2023-01-01 RX ADMIN — Medication 25 MG: at 16:09

## 2023-01-01 RX ADMIN — LIRAGLUTIDE 1.8 MG: 6 INJECTION SUBCUTANEOUS at 09:42

## 2023-01-01 RX ADMIN — APIXABAN 5 MG: 2.5 TABLET, FILM COATED ORAL at 09:29

## 2023-01-01 RX ADMIN — Medication 25 MG: at 04:54

## 2023-01-01 RX ADMIN — METFORMIN ER 500 MG 2000 MG: 500 TABLET ORAL at 09:23

## 2023-01-01 RX ADMIN — ATORVASTATIN CALCIUM 40 MG: 40 TABLET, FILM COATED ORAL at 08:05

## 2023-01-01 RX ADMIN — DOXYCYCLINE HYCLATE 100 MG: 100 CAPSULE ORAL at 10:38

## 2023-01-01 RX ADMIN — CARVEDILOL 6.25 MG: 6.25 TABLET, FILM COATED ORAL at 08:10

## 2023-01-01 RX ADMIN — METFORMIN HYDROCHLORIDE 2000 MG: 500 TABLET, EXTENDED RELEASE ORAL at 09:55

## 2023-01-01 RX ADMIN — CARVEDILOL 6.25 MG: 6.25 TABLET, FILM COATED ORAL at 11:33

## 2023-01-01 RX ADMIN — DOXYCYCLINE 100 MG: 100 CAPSULE ORAL at 05:13

## 2023-01-01 RX ADMIN — APIXABAN 5 MG: 2.5 TABLET, FILM COATED ORAL at 20:16

## 2023-01-01 RX ADMIN — APIXABAN 2.5 MG: 2.5 TABLET, FILM COATED ORAL at 20:01

## 2023-01-01 RX ADMIN — Medication 1 CAPSULE: at 21:28

## 2023-01-01 RX ADMIN — METRONIDAZOLE 500 MG: 500 TABLET ORAL at 09:43

## 2023-01-01 RX ADMIN — ATORVASTATIN CALCIUM 40 MG: 40 TABLET, FILM COATED ORAL at 11:26

## 2023-01-01 RX ADMIN — LIDOCAINE PATCH 4% 1 PATCH: 40 PATCH TOPICAL at 09:33

## 2023-01-01 RX ADMIN — Medication 25 MG: at 08:52

## 2023-01-01 RX ADMIN — FUROSEMIDE 60 MG: 40 TABLET ORAL at 08:12

## 2023-01-01 RX ADMIN — ACETAMINOPHEN 1000 MG: 325 TABLET ORAL at 09:23

## 2023-01-01 RX ADMIN — MICONAZOLE NITRATE: 20 POWDER TOPICAL at 21:41

## 2023-01-01 RX ADMIN — METHOCARBAMOL 500 MG: 500 TABLET ORAL at 10:09

## 2023-01-01 RX ADMIN — APIXABAN 5 MG: 5 TABLET, FILM COATED ORAL at 08:46

## 2023-01-01 RX ADMIN — INSULIN ASPART 1 UNITS: 100 INJECTION, SOLUTION INTRAVENOUS; SUBCUTANEOUS at 12:19

## 2023-01-01 RX ADMIN — METHOCARBAMOL 500 MG: 500 TABLET ORAL at 09:18

## 2023-01-01 RX ADMIN — TAMSULOSIN HYDROCHLORIDE 0.4 MG: 0.4 CAPSULE ORAL at 16:23

## 2023-01-01 RX ADMIN — MICONAZOLE NITRATE: 20 POWDER TOPICAL at 22:33

## 2023-01-01 RX ADMIN — Medication 1 CAPSULE: at 23:11

## 2023-01-01 RX ADMIN — Medication 25 MG: at 11:10

## 2023-01-01 RX ADMIN — Medication 50 MCG: at 13:03

## 2023-01-01 RX ADMIN — DOXYCYCLINE 100 MG: 100 CAPSULE ORAL at 10:06

## 2023-01-01 RX ADMIN — SENNOSIDES AND DOCUSATE SODIUM 1 TABLET: 8.6; 5 TABLET ORAL at 21:16

## 2023-01-01 RX ADMIN — THIAMINE HCL TAB 100 MG 100 MG: 100 TAB at 14:04

## 2023-01-01 RX ADMIN — APIXABAN 5 MG: 2.5 TABLET, FILM COATED ORAL at 21:44

## 2023-01-01 RX ADMIN — THIAMINE HCL TAB 100 MG 100 MG: 100 TAB at 11:32

## 2023-01-01 RX ADMIN — ACETAMINOPHEN 325MG 975 MG: 325 TABLET ORAL at 21:16

## 2023-01-01 RX ADMIN — CEFEPIME HYDROCHLORIDE 2 G: 2 INJECTION, POWDER, FOR SOLUTION INTRAVENOUS at 13:03

## 2023-01-01 RX ADMIN — METFORMIN ER 500 MG 2000 MG: 500 TABLET ORAL at 10:11

## 2023-01-01 RX ADMIN — CEFTAZIDIME 2 G: 2 INJECTION, POWDER, FOR SOLUTION INTRAVENOUS at 20:36

## 2023-01-01 RX ADMIN — Medication 3 MG: at 21:19

## 2023-01-01 RX ADMIN — DOCUSATE SODIUM 100 MG: 100 CAPSULE, LIQUID FILLED ORAL at 22:10

## 2023-01-01 RX ADMIN — ATORVASTATIN CALCIUM 40 MG: 40 TABLET, FILM COATED ORAL at 11:23

## 2023-01-01 RX ADMIN — CEFTAZIDIME 2 G: 2 INJECTION, POWDER, FOR SOLUTION INTRAVENOUS at 21:16

## 2023-01-01 RX ADMIN — DICLOFENAC SODIUM 2 G: 10 GEL TOPICAL at 21:37

## 2023-01-01 RX ADMIN — APIXABAN 2.5 MG: 2.5 TABLET, FILM COATED ORAL at 09:30

## 2023-01-01 RX ADMIN — MICONAZOLE NITRATE: 20 POWDER TOPICAL at 08:39

## 2023-01-01 RX ADMIN — LIRAGLUTIDE 1.8 MG: 6 INJECTION SUBCUTANEOUS at 09:55

## 2023-01-01 RX ADMIN — Medication 3 MG: at 22:47

## 2023-01-01 RX ADMIN — METHOCARBAMOL 500 MG: 500 TABLET ORAL at 14:31

## 2023-01-01 RX ADMIN — ATORVASTATIN CALCIUM 40 MG: 40 TABLET, FILM COATED ORAL at 09:21

## 2023-01-01 RX ADMIN — LIRAGLUTIDE 1.8 MG: 6 INJECTION SUBCUTANEOUS at 12:46

## 2023-01-01 RX ADMIN — HEPARIN, PORCINE (PF) 10 UNIT/ML INTRAVENOUS SYRINGE 5 ML: at 02:01

## 2023-01-01 RX ADMIN — APIXABAN 5 MG: 2.5 TABLET, FILM COATED ORAL at 09:52

## 2023-01-01 RX ADMIN — Medication 1 CAPSULE: at 21:37

## 2023-01-01 RX ADMIN — Medication 1 CAPSULE: at 10:07

## 2023-01-01 RX ADMIN — ACETAMINOPHEN 325MG 975 MG: 325 TABLET ORAL at 00:32

## 2023-01-01 RX ADMIN — LIRAGLUTIDE 1.8 MG: 6 INJECTION SUBCUTANEOUS at 08:42

## 2023-01-01 RX ADMIN — LIRAGLUTIDE 1.8 MG: 6 INJECTION SUBCUTANEOUS at 08:47

## 2023-01-01 RX ADMIN — TRAMADOL HYDROCHLORIDE 50 MG: 50 TABLET, COATED ORAL at 00:49

## 2023-01-01 RX ADMIN — METHOCARBAMOL 500 MG: 500 TABLET ORAL at 20:06

## 2023-01-01 RX ADMIN — MELATONIN TAB 3 MG 3 MG: 3 TAB at 21:41

## 2023-01-01 RX ADMIN — ACETAMINOPHEN 975 MG: 325 TABLET, FILM COATED ORAL at 05:13

## 2023-01-01 RX ADMIN — ATORVASTATIN CALCIUM 40 MG: 40 TABLET, FILM COATED ORAL at 10:57

## 2023-01-01 RX ADMIN — MICONAZOLE NITRATE: 20 POWDER TOPICAL at 11:10

## 2023-01-01 RX ADMIN — FUROSEMIDE 20 MG: 20 TABLET ORAL at 09:32

## 2023-01-01 RX ADMIN — METHOCARBAMOL 500 MG: 500 TABLET ORAL at 12:06

## 2023-01-01 RX ADMIN — PIOGLITAZONE HYDROCHLORIDE 45 MG: 45 TABLET ORAL at 08:26

## 2023-01-01 RX ADMIN — LIRAGLUTIDE 1.8 MG: 6 INJECTION SUBCUTANEOUS at 10:57

## 2023-01-01 RX ADMIN — ATORVASTATIN CALCIUM 40 MG: 40 TABLET, FILM COATED ORAL at 08:43

## 2023-01-01 RX ADMIN — METFORMIN ER 500 MG 2000 MG: 500 TABLET ORAL at 09:46

## 2023-01-01 RX ADMIN — POLYETHYLENE GLYCOL 3350 17 G: 17 POWDER, FOR SOLUTION ORAL at 09:45

## 2023-01-01 RX ADMIN — CIPROFLOXACIN HYDROCHLORIDE 500 MG: 500 TABLET, FILM COATED ORAL at 18:44

## 2023-01-01 RX ADMIN — SENNOSIDES AND DOCUSATE SODIUM 1 TABLET: 8.6; 5 TABLET ORAL at 20:17

## 2023-01-01 RX ADMIN — FERROUS SULFATE TAB 325 MG (65 MG ELEMENTAL FE) 325 MG: 325 (65 FE) TAB at 11:20

## 2023-01-01 RX ADMIN — Medication 3 MG: at 23:20

## 2023-01-01 RX ADMIN — LIRAGLUTIDE 1.8 MG: 6 INJECTION SUBCUTANEOUS at 08:51

## 2023-01-01 RX ADMIN — CEFEPIME HYDROCHLORIDE 2 G: 2 INJECTION, POWDER, FOR SOLUTION INTRAVENOUS at 00:49

## 2023-01-01 RX ADMIN — HEPARIN, PORCINE (PF) 10 UNIT/ML INTRAVENOUS SYRINGE 5 ML: at 21:57

## 2023-01-01 RX ADMIN — CIPROFLOXACIN HYDROCHLORIDE 500 MG: 500 TABLET, FILM COATED ORAL at 09:36

## 2023-01-01 RX ADMIN — LIRAGLUTIDE 1.8 MG: 6 INJECTION SUBCUTANEOUS at 09:33

## 2023-01-01 RX ADMIN — ACETAMINOPHEN 325MG 975 MG: 325 TABLET ORAL at 20:45

## 2023-01-01 RX ADMIN — DOXYCYCLINE HYCLATE 100 MG: 100 CAPSULE ORAL at 21:22

## 2023-01-01 RX ADMIN — SENNOSIDES AND DOCUSATE SODIUM 1 TABLET: 50; 8.6 TABLET ORAL at 08:24

## 2023-01-01 RX ADMIN — Medication 25 MG: at 13:39

## 2023-01-01 RX ADMIN — DOXYCYCLINE 100 MG: 100 CAPSULE ORAL at 10:41

## 2023-01-01 RX ADMIN — APIXABAN 5 MG: 2.5 TABLET, FILM COATED ORAL at 10:34

## 2023-01-01 RX ADMIN — ACETAMINOPHEN 325MG 975 MG: 325 TABLET ORAL at 23:09

## 2023-01-01 RX ADMIN — METFORMIN ER 500 MG 2000 MG: 500 TABLET ORAL at 11:00

## 2023-01-01 RX ADMIN — APIXABAN 5 MG: 2.5 TABLET, FILM COATED ORAL at 20:07

## 2023-01-01 RX ADMIN — ACETAMINOPHEN 975 MG: 325 TABLET, FILM COATED ORAL at 13:52

## 2023-01-01 RX ADMIN — METFORMIN ER 500 MG 2000 MG: 500 TABLET ORAL at 10:22

## 2023-01-01 RX ADMIN — AMLODIPINE BESYLATE 5 MG: 5 TABLET ORAL at 08:39

## 2023-01-01 RX ADMIN — APIXABAN 5 MG: 2.5 TABLET, FILM COATED ORAL at 09:25

## 2023-01-01 RX ADMIN — HYDROMORPHONE HYDROCHLORIDE 0.3 MG: 1 INJECTION, SOLUTION INTRAMUSCULAR; INTRAVENOUS; SUBCUTANEOUS at 18:25

## 2023-01-01 RX ADMIN — APIXABAN 2.5 MG: 2.5 TABLET, FILM COATED ORAL at 21:06

## 2023-01-01 RX ADMIN — ACETAMINOPHEN 325MG 975 MG: 325 TABLET ORAL at 16:46

## 2023-01-01 RX ADMIN — METHOCARBAMOL 500 MG: 500 TABLET ORAL at 09:58

## 2023-01-01 RX ADMIN — CIPROFLOXACIN HYDROCHLORIDE 500 MG: 250 TABLET, FILM COATED ORAL at 10:18

## 2023-01-01 RX ADMIN — Medication 1 CAPSULE: at 21:30

## 2023-01-01 RX ADMIN — MICONAZOLE NITRATE: 20 POWDER TOPICAL at 20:56

## 2023-01-01 RX ADMIN — SENNOSIDES AND DOCUSATE SODIUM 1 TABLET: 50; 8.6 TABLET ORAL at 07:35

## 2023-01-01 RX ADMIN — LIRAGLUTIDE 1.8 MG: 6 INJECTION SUBCUTANEOUS at 10:13

## 2023-01-01 RX ADMIN — DOCUSATE SODIUM 100 MG: 100 CAPSULE, LIQUID FILLED ORAL at 20:33

## 2023-01-01 RX ADMIN — ACETAMINOPHEN 975 MG: 325 TABLET, FILM COATED ORAL at 19:46

## 2023-01-01 RX ADMIN — DICLOFENAC SODIUM 2 G: 10 GEL TOPICAL at 18:04

## 2023-01-01 RX ADMIN — CEFTAZIDIME 2 G: 2 INJECTION, POWDER, FOR SOLUTION INTRAVENOUS at 19:56

## 2023-01-01 RX ADMIN — Medication 25 MG: at 09:44

## 2023-01-01 RX ADMIN — MELATONIN TAB 3 MG 3 MG: 3 TAB at 20:15

## 2023-01-01 RX ADMIN — OXYCODONE HYDROCHLORIDE 5 MG: 5 TABLET ORAL at 09:25

## 2023-01-01 RX ADMIN — THIAMINE HCL TAB 100 MG 100 MG: 100 TAB at 08:06

## 2023-01-01 RX ADMIN — Medication 1 CAPSULE: at 10:21

## 2023-01-01 RX ADMIN — MICONAZOLE NITRATE: 20 POWDER TOPICAL at 08:44

## 2023-01-01 RX ADMIN — FERROUS SULFATE TAB 325 MG (65 MG ELEMENTAL FE) 325 MG: 325 (65 FE) TAB at 11:29

## 2023-01-01 RX ADMIN — DOXYCYCLINE 100 MG: 100 CAPSULE ORAL at 21:08

## 2023-01-01 RX ADMIN — Medication 1 CAPSULE: at 09:03

## 2023-01-01 RX ADMIN — Medication 25 MG: at 10:55

## 2023-01-01 RX ADMIN — ACETAMINOPHEN 325MG 975 MG: 325 TABLET ORAL at 06:03

## 2023-01-01 RX ADMIN — PHENYLEPHRINE HYDROCHLORIDE 200 MCG: 10 INJECTION INTRAVENOUS at 11:06

## 2023-01-01 RX ADMIN — METFORMIN ER 500 MG 2000 MG: 500 TABLET ORAL at 10:09

## 2023-01-01 RX ADMIN — METFORMIN ER 500 MG 2000 MG: 500 TABLET ORAL at 11:23

## 2023-01-01 RX ADMIN — Medication 3 MG: at 21:05

## 2023-01-01 RX ADMIN — DEXTROSE MONOHYDRATE: 50 INJECTION, SOLUTION INTRAVENOUS at 13:51

## 2023-01-01 RX ADMIN — Medication 25 MG: at 08:17

## 2023-01-01 RX ADMIN — Medication 1 CAPSULE: at 21:40

## 2023-01-01 RX ADMIN — ACETAMINOPHEN 975 MG: 325 TABLET, FILM COATED ORAL at 06:23

## 2023-01-01 RX ADMIN — CARVEDILOL 6.25 MG: 6.25 TABLET, FILM COATED ORAL at 13:09

## 2023-01-01 RX ADMIN — MAGNESIUM SULFATE HEPTAHYDRATE 2 G: 40 INJECTION, SOLUTION INTRAVENOUS at 14:36

## 2023-01-01 RX ADMIN — PROPOFOL 120 MG: 10 INJECTION, EMULSION INTRAVENOUS at 12:53

## 2023-01-01 RX ADMIN — DOXYCYCLINE 100 MG: 100 CAPSULE ORAL at 21:17

## 2023-01-01 RX ADMIN — FUROSEMIDE 60 MG: 40 TABLET ORAL at 09:54

## 2023-01-01 RX ADMIN — ATORVASTATIN CALCIUM 40 MG: 40 TABLET, FILM COATED ORAL at 08:54

## 2023-01-01 RX ADMIN — Medication 2.5 MG: at 10:03

## 2023-01-01 RX ADMIN — FOLIC ACID 1 MG: 1 TABLET ORAL at 09:00

## 2023-01-01 RX ADMIN — CIPROFLOXACIN HYDROCHLORIDE 500 MG: 500 TABLET, FILM COATED ORAL at 21:35

## 2023-01-01 RX ADMIN — Medication 25 MG: at 20:54

## 2023-01-01 RX ADMIN — APIXABAN 5 MG: 2.5 TABLET, FILM COATED ORAL at 10:04

## 2023-01-01 RX ADMIN — APIXABAN 5 MG: 2.5 TABLET, FILM COATED ORAL at 10:03

## 2023-01-01 RX ADMIN — Medication 2.5 MG: at 04:24

## 2023-01-01 RX ADMIN — ACETAMINOPHEN 1000 MG: 325 TABLET ORAL at 11:08

## 2023-01-01 RX ADMIN — LIDOCAINE HYDROCHLORIDE 5 ML: 10 INJECTION, SOLUTION EPIDURAL; INFILTRATION; INTRACAUDAL; PERINEURAL at 15:06

## 2023-01-01 RX ADMIN — PIOGLITAZONE HYDROCHLORIDE 45 MG: 45 TABLET ORAL at 09:23

## 2023-01-01 RX ADMIN — APIXABAN 5 MG: 2.5 TABLET, FILM COATED ORAL at 20:50

## 2023-01-01 RX ADMIN — APIXABAN 5 MG: 2.5 TABLET, FILM COATED ORAL at 09:13

## 2023-01-01 RX ADMIN — AMLODIPINE BESYLATE 5 MG: 5 TABLET ORAL at 18:04

## 2023-01-01 RX ADMIN — LIRAGLUTIDE 1.8 MG: 6 INJECTION SUBCUTANEOUS at 09:43

## 2023-01-01 RX ADMIN — ACETAMINOPHEN 650 MG: 325 TABLET, FILM COATED ORAL at 17:46

## 2023-01-01 RX ADMIN — METFORMIN ER 500 MG 2000 MG: 500 TABLET ORAL at 08:30

## 2023-01-01 RX ADMIN — ACETAMINOPHEN 325MG 975 MG: 325 TABLET ORAL at 13:02

## 2023-01-01 RX ADMIN — METFORMIN HYDROCHLORIDE 2000 MG: 500 TABLET, EXTENDED RELEASE ORAL at 10:05

## 2023-01-01 RX ADMIN — PIOGLITAZONE HYDROCHLORIDE 45 MG: 45 TABLET ORAL at 10:02

## 2023-01-01 RX ADMIN — DOXYCYCLINE 100 MG: 100 CAPSULE ORAL at 17:24

## 2023-01-01 RX ADMIN — HEPARIN, PORCINE (PF) 10 UNIT/ML INTRAVENOUS SYRINGE 5 ML: at 14:06

## 2023-01-01 RX ADMIN — Medication 50 MCG: at 12:16

## 2023-01-01 RX ADMIN — LIRAGLUTIDE 1.8 MG: 6 INJECTION SUBCUTANEOUS at 09:29

## 2023-01-01 RX ADMIN — Medication 1 CAPSULE: at 11:20

## 2023-01-01 RX ADMIN — METHOCARBAMOL 500 MG: 500 TABLET ORAL at 18:54

## 2023-01-01 RX ADMIN — CIPROFLOXACIN HYDROCHLORIDE 500 MG: 500 TABLET, FILM COATED ORAL at 21:09

## 2023-01-01 RX ADMIN — CEFTAZIDIME 2 G: 2 INJECTION, POWDER, FOR SOLUTION INTRAVENOUS at 21:04

## 2023-01-01 RX ADMIN — PIOGLITAZONE HYDROCHLORIDE 45 MG: 45 TABLET ORAL at 12:46

## 2023-01-01 RX ADMIN — PIOGLITAZONE HYDROCHLORIDE 45 MG: 45 TABLET ORAL at 10:35

## 2023-01-01 RX ADMIN — DOXYCYCLINE 100 MG: 100 CAPSULE ORAL at 21:35

## 2023-01-01 RX ADMIN — DOCUSATE SODIUM 100 MG: 100 CAPSULE, LIQUID FILLED ORAL at 21:43

## 2023-01-01 RX ADMIN — APIXABAN 5 MG: 2.5 TABLET, FILM COATED ORAL at 20:41

## 2023-01-01 RX ADMIN — ACETAMINOPHEN 1000 MG: 500 TABLET ORAL at 10:05

## 2023-01-01 RX ADMIN — ONDANSETRON 4 MG: 2 INJECTION INTRAMUSCULAR; INTRAVENOUS at 17:08

## 2023-01-01 RX ADMIN — MICONAZOLE NITRATE: 20 POWDER TOPICAL at 22:29

## 2023-01-01 RX ADMIN — FERROUS SULFATE TAB 325 MG (65 MG ELEMENTAL FE) 325 MG: 325 (65 FE) TAB at 12:32

## 2023-01-01 RX ADMIN — DOXYCYCLINE HYCLATE 100 MG: 100 CAPSULE ORAL at 08:02

## 2023-01-01 RX ADMIN — Medication 1 CAPSULE: at 09:19

## 2023-01-01 RX ADMIN — TRAMADOL HYDROCHLORIDE 50 MG: 50 TABLET, COATED ORAL at 16:42

## 2023-01-01 RX ADMIN — APIXABAN 2.5 MG: 2.5 TABLET, FILM COATED ORAL at 08:43

## 2023-01-01 RX ADMIN — SODIUM CHLORIDE, POTASSIUM CHLORIDE, SODIUM LACTATE AND CALCIUM CHLORIDE: 600; 310; 30; 20 INJECTION, SOLUTION INTRAVENOUS at 12:50

## 2023-01-01 RX ADMIN — HEPARIN, PORCINE (PF) 10 UNIT/ML INTRAVENOUS SYRINGE 5 ML: at 21:47

## 2023-01-01 RX ADMIN — LIRAGLUTIDE 1.8 MG: 6 INJECTION SUBCUTANEOUS at 15:27

## 2023-01-01 RX ADMIN — CARVEDILOL 6.25 MG: 6.25 TABLET, FILM COATED ORAL at 09:15

## 2023-01-01 RX ADMIN — Medication 1 CAPSULE: at 08:49

## 2023-01-01 RX ADMIN — Medication 1 CAPSULE: at 10:13

## 2023-01-01 RX ADMIN — METHOCARBAMOL 500 MG: 500 TABLET ORAL at 10:11

## 2023-01-01 RX ADMIN — MICONAZOLE NITRATE: 20 POWDER TOPICAL at 09:27

## 2023-01-01 RX ADMIN — MICONAZOLE NITRATE: 20 POWDER TOPICAL at 21:03

## 2023-01-01 RX ADMIN — METFORMIN ER 500 MG 2000 MG: 500 TABLET ORAL at 09:17

## 2023-01-01 RX ADMIN — HYDROMORPHONE HYDROCHLORIDE 0.5 MG: 1 INJECTION, SOLUTION INTRAMUSCULAR; INTRAVENOUS; SUBCUTANEOUS at 16:59

## 2023-01-01 RX ADMIN — Medication 20 MG: at 15:46

## 2023-01-01 RX ADMIN — MICONAZOLE NITRATE: 20 POWDER TOPICAL at 08:24

## 2023-01-01 RX ADMIN — APIXABAN 5 MG: 2.5 TABLET, FILM COATED ORAL at 20:37

## 2023-01-01 RX ADMIN — OXYCODONE HYDROCHLORIDE 10 MG: 5 TABLET ORAL at 09:58

## 2023-01-01 RX ADMIN — HEPARIN SODIUM 5000 UNITS: 5000 INJECTION, SOLUTION INTRAVENOUS; SUBCUTANEOUS at 20:12

## 2023-01-01 RX ADMIN — APIXABAN 5 MG: 2.5 TABLET, FILM COATED ORAL at 22:10

## 2023-01-01 RX ADMIN — ACETAMINOPHEN 325MG 975 MG: 325 TABLET ORAL at 21:57

## 2023-01-01 RX ADMIN — METFORMIN HYDROCHLORIDE 2000 MG: 500 TABLET, EXTENDED RELEASE ORAL at 08:26

## 2023-01-01 RX ADMIN — FERROUS SULFATE TAB 325 MG (65 MG ELEMENTAL FE) 325 MG: 325 (65 FE) TAB at 19:32

## 2023-01-01 RX ADMIN — CIPROFLOXACIN HYDROCHLORIDE 750 MG: 250 TABLET, FILM COATED ORAL at 22:39

## 2023-01-01 RX ADMIN — FUROSEMIDE 60 MG: 40 TABLET ORAL at 08:49

## 2023-01-01 RX ADMIN — SUGAMMADEX 200 MG: 100 INJECTION, SOLUTION INTRAVENOUS at 18:00

## 2023-01-01 RX ADMIN — LIDOCAINE PATCH 4% 2 PATCH: 40 PATCH TOPICAL at 20:28

## 2023-01-01 RX ADMIN — ACETAMINOPHEN 325MG 975 MG: 325 TABLET ORAL at 14:42

## 2023-01-01 RX ADMIN — CARVEDILOL 6.25 MG: 6.25 TABLET, FILM COATED ORAL at 09:07

## 2023-01-01 RX ADMIN — ACETAMINOPHEN 1000 MG: 500 TABLET ORAL at 22:01

## 2023-01-01 RX ADMIN — METFORMIN ER 500 MG 2000 MG: 500 TABLET ORAL at 09:55

## 2023-01-01 RX ADMIN — HEPARIN, PORCINE (PF) 10 UNIT/ML INTRAVENOUS SYRINGE 5 ML: at 07:45

## 2023-01-01 RX ADMIN — AMLODIPINE BESYLATE 10 MG: 10 TABLET ORAL at 11:41

## 2023-01-01 RX ADMIN — METHOCARBAMOL 500 MG: 500 TABLET ORAL at 12:21

## 2023-01-01 RX ADMIN — OXYCODONE HYDROCHLORIDE 10 MG: 5 TABLET ORAL at 20:27

## 2023-01-01 RX ADMIN — FERROUS SULFATE TAB 325 MG (65 MG ELEMENTAL FE) 325 MG: 325 (65 FE) TAB at 13:20

## 2023-01-01 RX ADMIN — ACETAMINOPHEN 975 MG: 325 TABLET, FILM COATED ORAL at 12:19

## 2023-01-01 RX ADMIN — ACETAMINOPHEN 975 MG: 325 TABLET, FILM COATED ORAL at 21:59

## 2023-01-01 RX ADMIN — CARVEDILOL 6.25 MG: 6.25 TABLET, FILM COATED ORAL at 10:06

## 2023-01-01 RX ADMIN — ACETAMINOPHEN 650 MG: 325 TABLET, FILM COATED ORAL at 09:00

## 2023-01-01 RX ADMIN — Medication 1 CAPSULE: at 08:13

## 2023-01-01 RX ADMIN — METHOCARBAMOL 500 MG: 500 TABLET ORAL at 20:52

## 2023-01-01 RX ADMIN — SENNOSIDES AND DOCUSATE SODIUM 1 TABLET: 8.6; 5 TABLET ORAL at 09:54

## 2023-01-01 RX ADMIN — ACETAMINOPHEN 1000 MG: 500 TABLET ORAL at 10:08

## 2023-01-01 RX ADMIN — OXYCODONE HYDROCHLORIDE 5 MG: 5 TABLET ORAL at 20:39

## 2023-01-01 RX ADMIN — FUROSEMIDE 20 MG: 20 TABLET ORAL at 10:59

## 2023-01-01 RX ADMIN — Medication 25 MG: at 19:09

## 2023-01-01 RX ADMIN — POTASSIUM CHLORIDE 20 MEQ: 1.5 POWDER, FOR SOLUTION ORAL at 14:07

## 2023-01-01 RX ADMIN — APIXABAN 5 MG: 2.5 TABLET, FILM COATED ORAL at 10:13

## 2023-01-01 RX ADMIN — APIXABAN 5 MG: 2.5 TABLET, FILM COATED ORAL at 20:06

## 2023-01-01 RX ADMIN — OXYCODONE HYDROCHLORIDE 10 MG: 5 TABLET ORAL at 09:48

## 2023-01-01 RX ADMIN — Medication 2 G: at 20:17

## 2023-01-01 RX ADMIN — CEFTAZIDIME 2 G: 2 INJECTION, POWDER, FOR SOLUTION INTRAVENOUS at 21:08

## 2023-01-01 RX ADMIN — Medication 50 MCG: at 08:58

## 2023-01-01 RX ADMIN — APIXABAN 5 MG: 2.5 TABLET, FILM COATED ORAL at 21:09

## 2023-01-01 RX ADMIN — LIRAGLUTIDE 1.8 MG: 6 INJECTION SUBCUTANEOUS at 09:41

## 2023-01-01 RX ADMIN — Medication 25 MG: at 15:57

## 2023-01-01 RX ADMIN — APIXABAN 5 MG: 5 TABLET, FILM COATED ORAL at 20:26

## 2023-01-01 RX ADMIN — HYDROMORPHONE HYDROCHLORIDE 0.3 MG: 1 INJECTION, SOLUTION INTRAMUSCULAR; INTRAVENOUS; SUBCUTANEOUS at 08:57

## 2023-01-01 RX ADMIN — THIAMINE HCL TAB 100 MG 100 MG: 100 TAB at 10:36

## 2023-01-01 RX ADMIN — ACETAMINOPHEN 325MG 975 MG: 325 TABLET ORAL at 21:02

## 2023-01-01 RX ADMIN — MULTIPLE VITAMINS W/ MINERALS TAB 1 TABLET: TAB at 12:24

## 2023-01-01 RX ADMIN — MICONAZOLE NITRATE: 20 POWDER TOPICAL at 08:32

## 2023-01-01 RX ADMIN — METHOCARBAMOL 500 MG: 500 TABLET ORAL at 15:50

## 2023-01-01 RX ADMIN — CARVEDILOL 6.25 MG: 3.12 TABLET, FILM COATED ORAL at 08:02

## 2023-01-01 RX ADMIN — Medication 25 MG: at 08:53

## 2023-01-01 RX ADMIN — APIXABAN 5 MG: 5 TABLET, FILM COATED ORAL at 08:49

## 2023-01-01 RX ADMIN — Medication 1 CAPSULE: at 21:00

## 2023-01-01 RX ADMIN — ATORVASTATIN CALCIUM 40 MG: 40 TABLET, FILM COATED ORAL at 13:50

## 2023-01-01 RX ADMIN — CIPROFLOXACIN HYDROCHLORIDE 500 MG: 500 TABLET, FILM COATED ORAL at 11:20

## 2023-01-01 RX ADMIN — ACETAMINOPHEN 1000 MG: 500 TABLET ORAL at 09:59

## 2023-01-01 RX ADMIN — MICONAZOLE NITRATE: 20 POWDER TOPICAL at 19:59

## 2023-01-01 RX ADMIN — APIXABAN 5 MG: 2.5 TABLET, FILM COATED ORAL at 10:35

## 2023-01-01 RX ADMIN — Medication 1 CAPSULE: at 17:23

## 2023-01-01 RX ADMIN — LIRAGLUTIDE 1.8 MG: 6 INJECTION SUBCUTANEOUS at 09:30

## 2023-01-01 RX ADMIN — MELATONIN TAB 3 MG 3 MG: 3 TAB at 20:04

## 2023-01-01 RX ADMIN — OXYCODONE HYDROCHLORIDE 5 MG: 5 TABLET ORAL at 14:10

## 2023-01-01 RX ADMIN — METFORMIN ER 500 MG 2000 MG: 500 TABLET ORAL at 10:52

## 2023-01-01 RX ADMIN — METRONIDAZOLE 500 MG: 500 TABLET ORAL at 21:44

## 2023-01-01 RX ADMIN — OXYCODONE HYDROCHLORIDE 5 MG: 5 TABLET ORAL at 22:10

## 2023-01-01 RX ADMIN — PIOGLITAZONE HYDROCHLORIDE 45 MG: 45 TABLET ORAL at 09:59

## 2023-01-01 RX ADMIN — ACETAMINOPHEN 325MG 975 MG: 325 TABLET ORAL at 20:04

## 2023-01-01 RX ADMIN — LIDOCAINE PATCH 4% 1 PATCH: 40 PATCH TOPICAL at 10:25

## 2023-01-01 RX ADMIN — FOLIC ACID 1 MG: 1 TABLET ORAL at 09:23

## 2023-01-01 RX ADMIN — APIXABAN 5 MG: 2.5 TABLET, FILM COATED ORAL at 22:18

## 2023-01-01 RX ADMIN — SENNOSIDES AND DOCUSATE SODIUM 1 TABLET: 50; 8.6 TABLET ORAL at 09:20

## 2023-01-01 RX ADMIN — Medication 50 MCG: at 14:06

## 2023-01-01 RX ADMIN — ACETAMINOPHEN 975 MG: 325 TABLET, FILM COATED ORAL at 20:15

## 2023-01-01 RX ADMIN — PIOGLITAZONE HYDROCHLORIDE 45 MG: 45 TABLET ORAL at 08:35

## 2023-01-01 RX ADMIN — ACETAMINOPHEN 1000 MG: 500 TABLET ORAL at 10:34

## 2023-01-01 RX ADMIN — DOXYCYCLINE HYCLATE 100 MG: 100 CAPSULE ORAL at 19:44

## 2023-01-01 RX ADMIN — METFORMIN HYDROCHLORIDE 2000 MG: 500 TABLET, EXTENDED RELEASE ORAL at 08:30

## 2023-01-01 RX ADMIN — ATORVASTATIN CALCIUM 40 MG: 40 TABLET, FILM COATED ORAL at 10:15

## 2023-01-01 RX ADMIN — ATORVASTATIN CALCIUM 40 MG: 40 TABLET, FILM COATED ORAL at 09:14

## 2023-01-01 RX ADMIN — SENNOSIDES AND DOCUSATE SODIUM 1 TABLET: 8.6; 5 TABLET ORAL at 08:48

## 2023-01-01 RX ADMIN — Medication 1 CAPSULE: at 16:56

## 2023-01-01 RX ADMIN — FENTANYL CITRATE 50 MCG: 50 INJECTION, SOLUTION INTRAMUSCULAR; INTRAVENOUS at 14:32

## 2023-01-01 RX ADMIN — ACETAMINOPHEN 1000 MG: 325 TABLET ORAL at 16:55

## 2023-01-01 RX ADMIN — SENNOSIDES AND DOCUSATE SODIUM 1 TABLET: 50; 8.6 TABLET ORAL at 09:03

## 2023-01-01 RX ADMIN — PIOGLITAZONE HYDROCHLORIDE 45 MG: 45 TABLET ORAL at 10:37

## 2023-01-01 RX ADMIN — ATORVASTATIN CALCIUM 40 MG: 40 TABLET, FILM COATED ORAL at 13:01

## 2023-01-01 RX ADMIN — SENNOSIDES AND DOCUSATE SODIUM 1 TABLET: 50; 8.6 TABLET ORAL at 20:05

## 2023-01-01 RX ADMIN — APIXABAN 2.5 MG: 2.5 TABLET, FILM COATED ORAL at 21:37

## 2023-01-01 RX ADMIN — LIDOCAINE PATCH 4% 1 PATCH: 40 PATCH TOPICAL at 09:01

## 2023-01-01 RX ADMIN — Medication 25 MG: at 10:23

## 2023-01-01 RX ADMIN — MICONAZOLE NITRATE: 20 POWDER TOPICAL at 09:40

## 2023-01-01 RX ADMIN — Medication 1 CAPSULE: at 09:00

## 2023-01-01 RX ADMIN — APIXABAN 5 MG: 2.5 TABLET, FILM COATED ORAL at 21:27

## 2023-01-01 RX ADMIN — SENNOSIDES AND DOCUSATE SODIUM 1 TABLET: 50; 8.6 TABLET ORAL at 21:07

## 2023-01-01 RX ADMIN — APIXABAN 5 MG: 2.5 TABLET, FILM COATED ORAL at 11:06

## 2023-01-01 RX ADMIN — DICLOFENAC SODIUM 2 G: 10 GEL TOPICAL at 09:14

## 2023-01-01 RX ADMIN — METHOCARBAMOL 500 MG: 500 TABLET ORAL at 09:00

## 2023-01-01 RX ADMIN — VANCOMYCIN HYDROCHLORIDE 750 MG: 1 INJECTION, POWDER, LYOPHILIZED, FOR SOLUTION INTRAVENOUS at 15:26

## 2023-01-01 RX ADMIN — ACETAMINOPHEN 1000 MG: 500 TABLET ORAL at 20:52

## 2023-01-01 RX ADMIN — ACETAMINOPHEN 650 MG: 325 TABLET, FILM COATED ORAL at 16:39

## 2023-01-01 RX ADMIN — AMLODIPINE BESYLATE 10 MG: 10 TABLET ORAL at 11:32

## 2023-01-01 RX ADMIN — ACETAMINOPHEN 325MG 975 MG: 325 TABLET ORAL at 19:45

## 2023-01-01 RX ADMIN — METFORMIN ER 500 MG 2000 MG: 500 TABLET ORAL at 09:42

## 2023-01-01 RX ADMIN — Medication 3 MG: at 23:05

## 2023-01-01 RX ADMIN — Medication 1 CAPSULE: at 10:18

## 2023-01-01 RX ADMIN — METHOCARBAMOL 500 MG: 500 TABLET ORAL at 09:29

## 2023-01-01 RX ADMIN — Medication 25 MG: at 08:35

## 2023-01-01 RX ADMIN — ACETAMINOPHEN 650 MG: 325 TABLET, FILM COATED ORAL at 18:32

## 2023-01-01 RX ADMIN — CIPROFLOXACIN HYDROCHLORIDE 500 MG: 500 TABLET, FILM COATED ORAL at 10:40

## 2023-01-01 RX ADMIN — HEPARIN SODIUM 5000 UNITS: 5000 INJECTION, SOLUTION INTRAVENOUS; SUBCUTANEOUS at 07:58

## 2023-01-01 RX ADMIN — MULTIPLE VITAMINS W/ MINERALS TAB 1 TABLET: TAB at 12:01

## 2023-01-01 RX ADMIN — CIPROFLOXACIN HYDROCHLORIDE 500 MG: 500 TABLET, FILM COATED ORAL at 08:42

## 2023-01-01 RX ADMIN — ACETAMINOPHEN 1000 MG: 325 TABLET ORAL at 09:50

## 2023-01-01 RX ADMIN — Medication 50 MG: at 19:57

## 2023-01-01 RX ADMIN — FUROSEMIDE 60 MG: 40 TABLET ORAL at 16:19

## 2023-01-01 RX ADMIN — CEFTAZIDIME 2 G: 2 INJECTION, POWDER, FOR SOLUTION INTRAVENOUS at 20:04

## 2023-01-01 RX ADMIN — METFORMIN ER 500 MG 2000 MG: 500 TABLET ORAL at 10:58

## 2023-01-01 RX ADMIN — APIXABAN 2.5 MG: 2.5 TABLET, FILM COATED ORAL at 11:32

## 2023-01-01 RX ADMIN — ACETAMINOPHEN 975 MG: 325 TABLET, FILM COATED ORAL at 05:26

## 2023-01-01 RX ADMIN — Medication 1 CAPSULE: at 20:43

## 2023-01-01 RX ADMIN — Medication 50 MCG: at 08:57

## 2023-01-01 RX ADMIN — PIOGLITAZONE HYDROCHLORIDE 45 MG: 45 TABLET ORAL at 09:32

## 2023-01-01 RX ADMIN — CIPROFLOXACIN HYDROCHLORIDE 500 MG: 500 TABLET, FILM COATED ORAL at 18:42

## 2023-01-01 RX ADMIN — DOCUSATE SODIUM 100 MG: 100 CAPSULE, LIQUID FILLED ORAL at 21:56

## 2023-01-01 RX ADMIN — ACETAMINOPHEN 650 MG: 325 TABLET, FILM COATED ORAL at 16:53

## 2023-01-01 RX ADMIN — HEPARIN, PORCINE (PF) 10 UNIT/ML INTRAVENOUS SYRINGE 5 ML: at 08:02

## 2023-01-01 RX ADMIN — CIPROFLOXACIN HYDROCHLORIDE 500 MG: 500 TABLET, FILM COATED ORAL at 21:31

## 2023-01-01 RX ADMIN — POLYETHYLENE GLYCOL 3350 17 G: 17 POWDER, FOR SOLUTION ORAL at 20:07

## 2023-01-01 RX ADMIN — Medication 1 CAPSULE: at 09:36

## 2023-01-01 RX ADMIN — CIPROFLOXACIN HYDROCHLORIDE 500 MG: 500 TABLET, FILM COATED ORAL at 21:00

## 2023-01-01 RX ADMIN — APIXABAN 5 MG: 2.5 TABLET, FILM COATED ORAL at 09:55

## 2023-01-01 RX ADMIN — DICLOFENAC SODIUM 2 G: 10 GEL TOPICAL at 16:42

## 2023-01-01 RX ADMIN — HEPARIN, PORCINE (PF) 10 UNIT/ML INTRAVENOUS SYRINGE 5 ML: at 11:22

## 2023-01-01 RX ADMIN — APIXABAN 5 MG: 2.5 TABLET, FILM COATED ORAL at 19:56

## 2023-01-01 RX ADMIN — Medication 5 ML: at 14:34

## 2023-01-01 RX ADMIN — Medication 1 CAPSULE: at 08:39

## 2023-01-01 RX ADMIN — FOLIC ACID 1 MG: 1 TABLET ORAL at 10:06

## 2023-01-01 RX ADMIN — Medication 1 CAPSULE: at 20:49

## 2023-01-01 RX ADMIN — CARVEDILOL 6.25 MG: 3.12 TABLET, FILM COATED ORAL at 18:22

## 2023-01-01 RX ADMIN — ACETAMINOPHEN 650 MG: 325 TABLET, FILM COATED ORAL at 08:12

## 2023-01-01 RX ADMIN — APIXABAN 2.5 MG: 2.5 TABLET, FILM COATED ORAL at 20:37

## 2023-01-01 RX ADMIN — FENTANYL CITRATE 50 MCG: 50 INJECTION, SOLUTION INTRAMUSCULAR; INTRAVENOUS at 14:16

## 2023-01-01 RX ADMIN — Medication 3 MG: at 22:19

## 2023-01-01 RX ADMIN — FOLIC ACID 1 MG: 1 TABLET ORAL at 08:46

## 2023-01-01 RX ADMIN — ACETAMINOPHEN 1000 MG: 325 TABLET ORAL at 10:07

## 2023-01-01 RX ADMIN — NAPROXEN 500 MG: 250 TABLET ORAL at 18:20

## 2023-01-01 RX ADMIN — ATORVASTATIN CALCIUM 40 MG: 40 TABLET, FILM COATED ORAL at 09:33

## 2023-01-01 RX ADMIN — Medication 1 CAPSULE: at 21:31

## 2023-01-01 RX ADMIN — SODIUM CHLORIDE, POTASSIUM CHLORIDE, SODIUM LACTATE AND CALCIUM CHLORIDE: 600; 310; 30; 20 INJECTION, SOLUTION INTRAVENOUS at 17:13

## 2023-01-01 RX ADMIN — METFORMIN ER 500 MG 2000 MG: 500 TABLET ORAL at 11:08

## 2023-01-01 RX ADMIN — Medication 1 CAPSULE: at 21:01

## 2023-01-01 RX ADMIN — MULTIPLE VITAMINS W/ MINERALS TAB 1 TABLET: TAB at 13:28

## 2023-01-01 RX ADMIN — AMLODIPINE BESYLATE 10 MG: 10 TABLET ORAL at 11:26

## 2023-01-01 RX ADMIN — LIRAGLUTIDE 1.8 MG: 6 INJECTION SUBCUTANEOUS at 08:56

## 2023-01-01 RX ADMIN — Medication 1 CAPSULE: at 20:48

## 2023-01-01 RX ADMIN — Medication 1 CAPSULE: at 09:51

## 2023-01-01 RX ADMIN — DOXYCYCLINE 100 MG: 100 CAPSULE ORAL at 09:05

## 2023-01-01 RX ADMIN — Medication 25 MG: at 06:11

## 2023-01-01 RX ADMIN — LIRAGLUTIDE 1.8 MG: 6 INJECTION SUBCUTANEOUS at 09:46

## 2023-01-01 RX ADMIN — MULTIPLE VITAMINS W/ MINERALS TAB 1 TABLET: TAB at 13:33

## 2023-01-01 RX ADMIN — FOLIC ACID 1 MG: 1 TABLET ORAL at 09:59

## 2023-01-01 RX ADMIN — VANCOMYCIN HYDROCHLORIDE 500 MG: 500 INJECTION, POWDER, LYOPHILIZED, FOR SOLUTION INTRAVENOUS at 15:25

## 2023-01-01 RX ADMIN — METHOCARBAMOL 500 MG: 500 TABLET ORAL at 11:37

## 2023-01-01 RX ADMIN — LIRAGLUTIDE 1.8 MG: 6 INJECTION SUBCUTANEOUS at 09:57

## 2023-01-01 RX ADMIN — DOCUSATE SODIUM 100 MG: 100 CAPSULE, LIQUID FILLED ORAL at 21:44

## 2023-01-01 RX ADMIN — ACETAMINOPHEN 650 MG: 325 TABLET, FILM COATED ORAL at 21:59

## 2023-01-01 RX ADMIN — THIAMINE HCL TAB 100 MG 100 MG: 100 TAB at 09:38

## 2023-01-01 RX ADMIN — MULTIPLE VITAMINS W/ MINERALS TAB 1 TABLET: TAB at 11:32

## 2023-01-01 RX ADMIN — MULTIPLE VITAMINS W/ MINERALS TAB 1 TABLET: TAB at 12:19

## 2023-01-01 RX ADMIN — TRAMADOL HYDROCHLORIDE 50 MG: 50 TABLET, COATED ORAL at 02:34

## 2023-01-01 RX ADMIN — METHOCARBAMOL 500 MG: 500 TABLET ORAL at 18:03

## 2023-01-01 RX ADMIN — SENNOSIDES AND DOCUSATE SODIUM 2 TABLET: 50; 8.6 TABLET ORAL at 20:06

## 2023-01-01 RX ADMIN — OXYCODONE HYDROCHLORIDE 5 MG: 5 TABLET ORAL at 20:06

## 2023-01-01 RX ADMIN — APIXABAN 2.5 MG: 2.5 TABLET, FILM COATED ORAL at 23:29

## 2023-01-01 RX ADMIN — HEPARIN, PORCINE (PF) 10 UNIT/ML INTRAVENOUS SYRINGE 5 ML: at 08:50

## 2023-01-01 RX ADMIN — METHOCARBAMOL 500 MG: 500 TABLET ORAL at 11:27

## 2023-01-01 RX ADMIN — MELATONIN TAB 3 MG 3 MG: 3 TAB at 20:11

## 2023-01-01 RX ADMIN — APIXABAN 5 MG: 5 TABLET, FILM COATED ORAL at 09:07

## 2023-01-01 RX ADMIN — MULTIPLE VITAMINS W/ MINERALS TAB 1 TABLET: TAB at 12:17

## 2023-01-01 RX ADMIN — ACETAMINOPHEN 325MG 975 MG: 325 TABLET ORAL at 21:10

## 2023-01-01 RX ADMIN — APIXABAN 5 MG: 5 TABLET, FILM COATED ORAL at 20:06

## 2023-01-01 RX ADMIN — INSULIN ASPART 1 UNITS: 100 INJECTION, SOLUTION INTRAVENOUS; SUBCUTANEOUS at 13:27

## 2023-01-01 RX ADMIN — SODIUM CHLORIDE, POTASSIUM CHLORIDE, SODIUM LACTATE AND CALCIUM CHLORIDE 500 ML: 600; 310; 30; 20 INJECTION, SOLUTION INTRAVENOUS at 15:23

## 2023-01-01 RX ADMIN — FOLIC ACID 1 MG: 1 TABLET ORAL at 07:58

## 2023-01-01 RX ADMIN — ACETAMINOPHEN 975 MG: 325 TABLET, FILM COATED ORAL at 20:04

## 2023-01-01 RX ADMIN — CEFTAZIDIME 2 G: 2 INJECTION, POWDER, FOR SOLUTION INTRAVENOUS at 20:55

## 2023-01-01 RX ADMIN — APIXABAN 5 MG: 2.5 TABLET, FILM COATED ORAL at 09:40

## 2023-01-01 RX ADMIN — MICONAZOLE NITRATE: 20 POWDER TOPICAL at 11:11

## 2023-01-01 RX ADMIN — MULTIPLE VITAMINS W/ MINERALS TAB 1 TABLET: TAB at 13:02

## 2023-01-01 RX ADMIN — Medication 1 CAPSULE: at 20:21

## 2023-01-01 RX ADMIN — DOXYCYCLINE 100 MG: 100 CAPSULE ORAL at 17:36

## 2023-01-01 RX ADMIN — METFORMIN ER 500 MG 2000 MG: 500 TABLET ORAL at 11:18

## 2023-01-01 RX ADMIN — TRAMADOL HYDROCHLORIDE 50 MG: 50 TABLET, COATED ORAL at 11:01

## 2023-01-01 RX ADMIN — AMLODIPINE BESYLATE 5 MG: 5 TABLET ORAL at 08:15

## 2023-01-01 RX ADMIN — Medication 1 CAPSULE: at 20:24

## 2023-01-01 RX ADMIN — POLYETHYLENE GLYCOL 3350 17 G: 17 POWDER, FOR SOLUTION ORAL at 09:15

## 2023-01-01 RX ADMIN — MELATONIN TAB 3 MG 3 MG: 3 TAB at 22:17

## 2023-01-01 RX ADMIN — METHOCARBAMOL 500 MG: 500 TABLET ORAL at 21:40

## 2023-01-01 RX ADMIN — OXYCODONE HYDROCHLORIDE 10 MG: 10 TABLET ORAL at 20:15

## 2023-01-01 RX ADMIN — ACETAMINOPHEN 325MG 975 MG: 325 TABLET ORAL at 05:18

## 2023-01-01 RX ADMIN — FOLIC ACID 1 MG: 1 TABLET ORAL at 08:58

## 2023-01-01 RX ADMIN — Medication 1 CAPSULE: at 08:58

## 2023-01-01 RX ADMIN — CIPROFLOXACIN HYDROCHLORIDE 500 MG: 500 TABLET, FILM COATED ORAL at 09:49

## 2023-01-01 RX ADMIN — SODIUM CHLORIDE 500 ML: 9 INJECTION, SOLUTION INTRAVENOUS at 20:37

## 2023-01-01 RX ADMIN — SODIUM CHLORIDE 500 ML: 9 INJECTION, SOLUTION INTRAVENOUS at 17:43

## 2023-01-01 RX ADMIN — DICLOFENAC SODIUM 2 G: 10 GEL TOPICAL at 17:33

## 2023-01-01 RX ADMIN — METFORMIN HYDROCHLORIDE 2000 MG: 500 TABLET, EXTENDED RELEASE ORAL at 09:59

## 2023-01-01 RX ADMIN — CIPROFLOXACIN HYDROCHLORIDE 500 MG: 500 TABLET, FILM COATED ORAL at 21:17

## 2023-01-01 RX ADMIN — FENTANYL CITRATE 50 MCG: 50 INJECTION INTRAMUSCULAR; INTRAVENOUS at 12:40

## 2023-01-01 RX ADMIN — DICLOFENAC SODIUM 2 G: 10 GEL TOPICAL at 08:42

## 2023-01-01 RX ADMIN — MELATONIN TAB 3 MG 3 MG: 3 TAB at 21:37

## 2023-01-01 RX ADMIN — SODIUM CHLORIDE: 9 INJECTION, SOLUTION INTRAVENOUS at 09:03

## 2023-01-01 RX ADMIN — HEPARIN, PORCINE (PF) 10 UNIT/ML INTRAVENOUS SYRINGE 5 ML: at 22:05

## 2023-01-01 RX ADMIN — SODIUM CHLORIDE, SODIUM LACTATE, POTASSIUM CHLORIDE, CALCIUM CHLORIDE: 600; 310; 30; 20 INJECTION, SOLUTION INTRAVENOUS at 14:34

## 2023-01-01 RX ADMIN — ACETAMINOPHEN 1000 MG: 325 TABLET ORAL at 20:14

## 2023-01-01 RX ADMIN — MICONAZOLE NITRATE: 20 POWDER TOPICAL at 08:53

## 2023-01-01 RX ADMIN — Medication 10 MG: at 16:53

## 2023-01-01 RX ADMIN — Medication 1 CAPSULE: at 09:20

## 2023-01-01 RX ADMIN — INSULIN ASPART 1 UNITS: 100 INJECTION, SOLUTION INTRAVENOUS; SUBCUTANEOUS at 13:05

## 2023-01-01 RX ADMIN — Medication 1 CAPSULE: at 11:40

## 2023-01-01 RX ADMIN — ACETAMINOPHEN 325MG 975 MG: 325 TABLET ORAL at 06:21

## 2023-01-01 RX ADMIN — Medication 1 CAPSULE: at 20:44

## 2023-01-01 RX ADMIN — OXYCODONE HYDROCHLORIDE 10 MG: 5 TABLET ORAL at 20:14

## 2023-01-01 RX ADMIN — APIXABAN 5 MG: 5 TABLET, FILM COATED ORAL at 20:13

## 2023-01-01 RX ADMIN — Medication 25 MG: at 16:48

## 2023-01-01 RX ADMIN — ACETAMINOPHEN 325MG 975 MG: 325 TABLET ORAL at 08:32

## 2023-01-01 RX ADMIN — ACETAMINOPHEN 1000 MG: 325 TABLET ORAL at 10:42

## 2023-01-01 RX ADMIN — ATORVASTATIN CALCIUM 40 MG: 40 TABLET, FILM COATED ORAL at 16:17

## 2023-01-01 RX ADMIN — APIXABAN 5 MG: 2.5 TABLET, FILM COATED ORAL at 20:35

## 2023-01-01 RX ADMIN — AMLODIPINE BESYLATE 10 MG: 10 TABLET ORAL at 09:36

## 2023-01-01 RX ADMIN — ACETAMINOPHEN 325MG 975 MG: 325 TABLET ORAL at 21:18

## 2023-01-01 RX ADMIN — APIXABAN 5 MG: 2.5 TABLET, FILM COATED ORAL at 11:09

## 2023-01-01 RX ADMIN — Medication 1 CAPSULE: at 09:59

## 2023-01-01 RX ADMIN — PHENYLEPHRINE HYDROCHLORIDE 100 MCG: 10 INJECTION INTRAVENOUS at 15:12

## 2023-01-01 RX ADMIN — DOXYCYCLINE 100 MG: 100 CAPSULE ORAL at 21:00

## 2023-01-01 RX ADMIN — CIPROFLOXACIN HYDROCHLORIDE 500 MG: 500 TABLET, FILM COATED ORAL at 20:12

## 2023-01-01 RX ADMIN — Medication 50 MCG: at 13:01

## 2023-01-01 RX ADMIN — PIOGLITAZONE HYDROCHLORIDE 45 MG: 45 TABLET ORAL at 11:48

## 2023-01-01 RX ADMIN — MICONAZOLE NITRATE: 20 POWDER TOPICAL at 21:43

## 2023-01-01 RX ADMIN — THIAMINE HCL TAB 100 MG 100 MG: 100 TAB at 12:32

## 2023-01-01 RX ADMIN — METHOCARBAMOL 500 MG: 500 TABLET ORAL at 16:32

## 2023-01-01 RX ADMIN — FOLIC ACID 1 MG: 1 TABLET ORAL at 09:20

## 2023-01-01 RX ADMIN — Medication 25 MG: at 10:03

## 2023-01-01 RX ADMIN — SODIUM CHLORIDE 500 ML: 9 INJECTION, SOLUTION INTRAVENOUS at 20:27

## 2023-01-01 RX ADMIN — HYDROMORPHONE HYDROCHLORIDE 0.4 MG: 1 INJECTION, SOLUTION INTRAMUSCULAR; INTRAVENOUS; SUBCUTANEOUS at 19:22

## 2023-01-01 RX ADMIN — INSULIN ASPART 2 UNITS: 100 INJECTION, SOLUTION INTRAVENOUS; SUBCUTANEOUS at 12:07

## 2023-01-01 RX ADMIN — OXYCODONE HYDROCHLORIDE 10 MG: 10 TABLET ORAL at 04:53

## 2023-01-01 RX ADMIN — HEPARIN, PORCINE (PF) 10 UNIT/ML INTRAVENOUS SYRINGE 5 ML: at 06:48

## 2023-01-01 RX ADMIN — Medication 50 MCG: at 08:42

## 2023-01-01 RX ADMIN — CEFTAZIDIME 2 G: 2 INJECTION, POWDER, FOR SOLUTION INTRAVENOUS at 20:21

## 2023-01-01 RX ADMIN — Medication 5 ML: at 14:27

## 2023-01-01 RX ADMIN — MAGNESIUM SULFATE HEPTAHYDRATE 4 G: 40 INJECTION, SOLUTION INTRAVENOUS at 10:40

## 2023-01-01 RX ADMIN — POLYETHYLENE GLYCOL 3350 17 G: 17 POWDER, FOR SOLUTION ORAL at 09:19

## 2023-01-01 RX ADMIN — OXYCODONE HYDROCHLORIDE 5 MG: 5 TABLET ORAL at 22:34

## 2023-01-01 RX ADMIN — MICONAZOLE NITRATE: 20 POWDER TOPICAL at 10:36

## 2023-01-01 RX ADMIN — ACETAMINOPHEN 325MG 975 MG: 325 TABLET ORAL at 05:36

## 2023-01-01 RX ADMIN — AMLODIPINE BESYLATE 10 MG: 10 TABLET ORAL at 09:15

## 2023-01-01 RX ADMIN — CEFTAZIDIME 2 G: 2 INJECTION, POWDER, FOR SOLUTION INTRAVENOUS at 20:15

## 2023-01-01 RX ADMIN — PIOGLITAZONE HYDROCHLORIDE 45 MG: 45 TABLET ORAL at 11:17

## 2023-01-01 RX ADMIN — SODIUM CHLORIDE, POTASSIUM CHLORIDE, SODIUM LACTATE AND CALCIUM CHLORIDE: 600; 310; 30; 20 INJECTION, SOLUTION INTRAVENOUS at 18:07

## 2023-01-01 RX ADMIN — FERROUS SULFATE TAB 325 MG (65 MG ELEMENTAL FE) 325 MG: 325 (65 FE) TAB at 12:15

## 2023-01-01 RX ADMIN — Medication 1 CAPSULE: at 21:52

## 2023-01-01 RX ADMIN — APIXABAN 5 MG: 2.5 TABLET, FILM COATED ORAL at 11:41

## 2023-01-01 RX ADMIN — ATORVASTATIN CALCIUM 40 MG: 40 TABLET, FILM COATED ORAL at 08:38

## 2023-01-01 RX ADMIN — PIOGLITAZONE HYDROCHLORIDE 45 MG: 45 TABLET ORAL at 10:12

## 2023-01-01 RX ADMIN — METFORMIN ER 500 MG 2000 MG: 500 TABLET ORAL at 10:17

## 2023-01-01 RX ADMIN — METFORMIN ER 500 MG 2000 MG: 500 TABLET ORAL at 08:53

## 2023-01-01 RX ADMIN — Medication 25 MG: at 08:49

## 2023-01-01 RX ADMIN — MICONAZOLE NITRATE: 20 POWDER TOPICAL at 09:34

## 2023-01-01 RX ADMIN — CIPROFLOXACIN HYDROCHLORIDE 500 MG: 500 TABLET, FILM COATED ORAL at 21:04

## 2023-01-01 RX ADMIN — ACETAMINOPHEN 1000 MG: 500 TABLET ORAL at 10:07

## 2023-01-01 RX ADMIN — CEFEPIME HYDROCHLORIDE 2 G: 2 INJECTION, POWDER, FOR SOLUTION INTRAVENOUS at 23:32

## 2023-01-01 RX ADMIN — METFORMIN HYDROCHLORIDE 2000 MG: 500 TABLET, EXTENDED RELEASE ORAL at 09:12

## 2023-01-01 RX ADMIN — CIPROFLOXACIN HYDROCHLORIDE 750 MG: 250 TABLET, FILM COATED ORAL at 08:11

## 2023-01-01 RX ADMIN — Medication 50 MCG: at 10:41

## 2023-01-01 RX ADMIN — THIAMINE HCL TAB 100 MG 100 MG: 100 TAB at 13:30

## 2023-01-01 RX ADMIN — ATORVASTATIN CALCIUM 40 MG: 40 TABLET, FILM COATED ORAL at 08:35

## 2023-01-01 RX ADMIN — MICONAZOLE NITRATE: 20 POWDER TOPICAL at 09:55

## 2023-01-01 RX ADMIN — Medication 5 ML: at 14:06

## 2023-01-01 RX ADMIN — Medication 1 CAPSULE: at 08:34

## 2023-01-01 RX ADMIN — POLYETHYLENE GLYCOL 3350 17 G: 17 POWDER, FOR SOLUTION ORAL at 09:10

## 2023-01-01 RX ADMIN — INSULIN ASPART 1 UNITS: 100 INJECTION, SOLUTION INTRAVENOUS; SUBCUTANEOUS at 13:11

## 2023-01-01 RX ADMIN — MULTIPLE VITAMINS W/ MINERALS TAB 1 TABLET: TAB at 15:31

## 2023-01-01 RX ADMIN — METFORMIN HYDROCHLORIDE 2000 MG: 500 TABLET, EXTENDED RELEASE ORAL at 08:38

## 2023-01-01 RX ADMIN — METHOCARBAMOL 500 MG: 500 TABLET ORAL at 16:51

## 2023-01-01 RX ADMIN — ACETAMINOPHEN 975 MG: 325 TABLET, FILM COATED ORAL at 21:10

## 2023-01-01 RX ADMIN — PIOGLITAZONE HYDROCHLORIDE 45 MG: 45 TABLET ORAL at 09:50

## 2023-01-01 RX ADMIN — Medication 2.5 MG: at 20:16

## 2023-01-01 RX ADMIN — Medication 1 MG: at 00:35

## 2023-01-01 RX ADMIN — FERROUS SULFATE TAB 325 MG (65 MG ELEMENTAL FE) 325 MG: 325 (65 FE) TAB at 11:09

## 2023-01-01 RX ADMIN — FOLIC ACID 1 MG: 1 TABLET ORAL at 09:22

## 2023-01-01 RX ADMIN — AMLODIPINE BESYLATE 10 MG: 10 TABLET ORAL at 12:47

## 2023-01-01 RX ADMIN — THIAMINE HYDROCHLORIDE 500 MG: 100 INJECTION, SOLUTION INTRAMUSCULAR; INTRAVENOUS at 08:37

## 2023-01-01 RX ADMIN — MICONAZOLE NITRATE: 20 POWDER TOPICAL at 20:14

## 2023-01-01 RX ADMIN — FUROSEMIDE 20 MG: 20 TABLET ORAL at 08:33

## 2023-01-01 RX ADMIN — METFORMIN ER 500 MG 2000 MG: 500 TABLET ORAL at 08:45

## 2023-01-01 RX ADMIN — DEXAMETHASONE SODIUM PHOSPHATE 4 MG: 4 INJECTION, SOLUTION INTRA-ARTICULAR; INTRALESIONAL; INTRAMUSCULAR; INTRAVENOUS; SOFT TISSUE at 15:46

## 2023-01-01 RX ADMIN — INSULIN ASPART 1 UNITS: 100 INJECTION, SOLUTION INTRAVENOUS; SUBCUTANEOUS at 08:19

## 2023-01-01 RX ADMIN — Medication 3 MG: at 21:31

## 2023-01-01 RX ADMIN — SENNOSIDES AND DOCUSATE SODIUM 1 TABLET: 8.6; 5 TABLET ORAL at 08:30

## 2023-01-01 RX ADMIN — Medication 50 MCG: at 12:47

## 2023-01-01 RX ADMIN — MICONAZOLE NITRATE: 20 POWDER TOPICAL at 20:38

## 2023-01-01 RX ADMIN — ACETAMINOPHEN 975 MG: 325 TABLET, FILM COATED ORAL at 21:33

## 2023-01-01 RX ADMIN — FUROSEMIDE 60 MG: 40 TABLET ORAL at 18:04

## 2023-01-01 RX ADMIN — SODIUM CHLORIDE, PRESERVATIVE FREE: 5 INJECTION INTRAVENOUS at 10:09

## 2023-01-01 RX ADMIN — PIOGLITAZONE HYDROCHLORIDE 45 MG: 45 TABLET ORAL at 08:28

## 2023-01-01 RX ADMIN — THIAMINE HCL TAB 100 MG 100 MG: 100 TAB at 09:06

## 2023-01-01 RX ADMIN — Medication 25 MG: at 13:20

## 2023-01-01 RX ADMIN — APIXABAN 5 MG: 2.5 TABLET, FILM COATED ORAL at 11:20

## 2023-01-01 RX ADMIN — ACETAMINOPHEN 325MG 975 MG: 325 TABLET ORAL at 09:25

## 2023-01-01 RX ADMIN — NAPROXEN 500 MG: 250 TABLET ORAL at 08:52

## 2023-01-01 RX ADMIN — SENNOSIDES AND DOCUSATE SODIUM 1 TABLET: 8.6; 5 TABLET ORAL at 20:13

## 2023-01-01 RX ADMIN — FUROSEMIDE 20 MG: 20 TABLET ORAL at 10:38

## 2023-01-01 RX ADMIN — DOXYCYCLINE 100 MG: 100 CAPSULE ORAL at 20:54

## 2023-01-01 RX ADMIN — METHOCARBAMOL 500 MG: 500 TABLET ORAL at 10:08

## 2023-01-01 RX ADMIN — SENNOSIDES AND DOCUSATE SODIUM 1 TABLET: 50; 8.6 TABLET ORAL at 08:34

## 2023-01-01 RX ADMIN — APIXABAN 2.5 MG: 2.5 TABLET, FILM COATED ORAL at 08:32

## 2023-01-01 RX ADMIN — FERROUS SULFATE TAB 325 MG (65 MG ELEMENTAL FE) 325 MG: 325 (65 FE) TAB at 11:47

## 2023-01-01 RX ADMIN — Medication 5 ML: at 16:53

## 2023-01-01 RX ADMIN — Medication 25 MG: at 18:54

## 2023-01-01 RX ADMIN — FUROSEMIDE 20 MG: 20 TABLET ORAL at 11:10

## 2023-01-01 RX ADMIN — FOLIC ACID 1 MG: 1 TABLET ORAL at 11:11

## 2023-01-01 RX ADMIN — DOXYCYCLINE HYCLATE 100 MG: 100 CAPSULE ORAL at 09:23

## 2023-01-01 RX ADMIN — METFORMIN ER 500 MG 2000 MG: 500 TABLET ORAL at 09:21

## 2023-01-01 RX ADMIN — ACETAMINOPHEN 650 MG: 325 TABLET, FILM COATED ORAL at 15:07

## 2023-01-01 RX ADMIN — METHOCARBAMOL 500 MG: 500 TABLET ORAL at 17:32

## 2023-01-01 RX ADMIN — PIOGLITAZONE HYDROCHLORIDE 45 MG: 45 TABLET ORAL at 09:53

## 2023-01-01 RX ADMIN — METHOCARBAMOL 500 MG: 500 TABLET ORAL at 20:10

## 2023-01-01 RX ADMIN — APIXABAN 5 MG: 2.5 TABLET, FILM COATED ORAL at 10:52

## 2023-01-01 RX ADMIN — METFORMIN ER 500 MG 2000 MG: 500 TABLET ORAL at 10:54

## 2023-01-01 RX ADMIN — ACETAMINOPHEN 325MG 975 MG: 325 TABLET ORAL at 07:04

## 2023-01-01 RX ADMIN — LIDOCAINE HYDROCHLORIDE 15 ML: 20 SOLUTION ORAL; TOPICAL at 10:00

## 2023-01-01 RX ADMIN — METHOCARBAMOL 500 MG: 500 TABLET ORAL at 08:52

## 2023-01-01 RX ADMIN — Medication 1 CAPSULE: at 20:38

## 2023-01-01 RX ADMIN — INSULIN ASPART 1 UNITS: 100 INJECTION, SOLUTION INTRAVENOUS; SUBCUTANEOUS at 20:45

## 2023-01-01 RX ADMIN — SENNOSIDES AND DOCUSATE SODIUM 1 TABLET: 8.6; 5 TABLET ORAL at 22:10

## 2023-01-01 RX ADMIN — DOXYCYCLINE HYCLATE 100 MG: 100 CAPSULE ORAL at 22:20

## 2023-01-01 RX ADMIN — SENNOSIDES AND DOCUSATE SODIUM 1 TABLET: 50; 8.6 TABLET ORAL at 08:32

## 2023-01-01 RX ADMIN — HYDROMORPHONE HYDROCHLORIDE 0.4 MG: 1 INJECTION, SOLUTION INTRAMUSCULAR; INTRAVENOUS; SUBCUTANEOUS at 22:06

## 2023-01-01 RX ADMIN — ONDANSETRON 4 MG: 2 INJECTION INTRAMUSCULAR; INTRAVENOUS at 11:52

## 2023-01-01 RX ADMIN — SODIUM CHLORIDE, POTASSIUM CHLORIDE, SODIUM LACTATE AND CALCIUM CHLORIDE 1000 ML: 600; 310; 30; 20 INJECTION, SOLUTION INTRAVENOUS at 11:11

## 2023-01-01 RX ADMIN — LIDOCAINE PATCH 4% 1 PATCH: 40 PATCH TOPICAL at 19:32

## 2023-01-01 RX ADMIN — SENNOSIDES AND DOCUSATE SODIUM 1 TABLET: 50; 8.6 TABLET ORAL at 07:58

## 2023-01-01 RX ADMIN — GADOBUTROL 13 ML: 604.72 INJECTION INTRAVENOUS at 19:27

## 2023-01-01 RX ADMIN — OXYCODONE HYDROCHLORIDE 5 MG: 5 TABLET ORAL at 10:11

## 2023-01-01 RX ADMIN — APIXABAN 5 MG: 2.5 TABLET, FILM COATED ORAL at 11:17

## 2023-01-01 RX ADMIN — Medication 5 ML: at 17:33

## 2023-01-01 RX ADMIN — OXYCODONE HYDROCHLORIDE 10 MG: 5 TABLET ORAL at 09:12

## 2023-01-01 RX ADMIN — SENNOSIDES AND DOCUSATE SODIUM 1 TABLET: 50; 8.6 TABLET ORAL at 20:27

## 2023-01-01 RX ADMIN — CARVEDILOL 6.25 MG: 6.25 TABLET, FILM COATED ORAL at 08:20

## 2023-01-01 RX ADMIN — APIXABAN 5 MG: 2.5 TABLET, FILM COATED ORAL at 09:24

## 2023-01-01 RX ADMIN — THIAMINE HYDROCHLORIDE 500 MG: 100 INJECTION, SOLUTION INTRAMUSCULAR; INTRAVENOUS at 09:02

## 2023-01-01 RX ADMIN — MICONAZOLE NITRATE: 20 POWDER TOPICAL at 10:07

## 2023-01-01 RX ADMIN — ACETAMINOPHEN 1000 MG: 500 TABLET ORAL at 20:12

## 2023-01-01 RX ADMIN — METFORMIN ER 500 MG 2000 MG: 500 TABLET ORAL at 09:36

## 2023-01-01 RX ADMIN — APIXABAN 2.5 MG: 2.5 TABLET, FILM COATED ORAL at 08:14

## 2023-01-01 RX ADMIN — FERROUS SULFATE TAB 325 MG (65 MG ELEMENTAL FE) 325 MG: 325 (65 FE) TAB at 12:40

## 2023-01-01 RX ADMIN — POLYETHYLENE GLYCOL 3350 17 G: 17 POWDER, FOR SOLUTION ORAL at 09:03

## 2023-01-01 RX ADMIN — AMLODIPINE BESYLATE 10 MG: 10 TABLET ORAL at 09:18

## 2023-01-01 RX ADMIN — MELATONIN TAB 3 MG 3 MG: 3 TAB at 20:55

## 2023-01-01 RX ADMIN — Medication 50 MG: at 17:12

## 2023-01-01 RX ADMIN — FERROUS SULFATE TAB 325 MG (65 MG ELEMENTAL FE) 325 MG: 325 (65 FE) TAB at 13:54

## 2023-01-01 RX ADMIN — MULTIPLE VITAMINS W/ MINERALS TAB 1 TABLET: TAB at 13:24

## 2023-01-01 RX ADMIN — MULTIPLE VITAMINS W/ MINERALS TAB 1 TABLET: TAB at 09:15

## 2023-01-01 RX ADMIN — ACETAMINOPHEN 325MG 975 MG: 325 TABLET ORAL at 06:19

## 2023-01-01 RX ADMIN — APIXABAN 5 MG: 2.5 TABLET, FILM COATED ORAL at 20:10

## 2023-01-01 RX ADMIN — PIOGLITAZONE HYDROCHLORIDE 45 MG: 45 TABLET ORAL at 08:55

## 2023-01-01 RX ADMIN — LIRAGLUTIDE 1.8 MG: 6 INJECTION SUBCUTANEOUS at 08:26

## 2023-01-01 RX ADMIN — APIXABAN 5 MG: 2.5 TABLET, FILM COATED ORAL at 21:58

## 2023-01-01 RX ADMIN — OXYCODONE HYDROCHLORIDE 10 MG: 5 TABLET ORAL at 17:32

## 2023-01-01 RX ADMIN — TRAMADOL HYDROCHLORIDE 50 MG: 50 TABLET, COATED ORAL at 20:14

## 2023-01-01 RX ADMIN — Medication 3 MG: at 21:47

## 2023-01-01 RX ADMIN — Medication 1 CAPSULE: at 20:46

## 2023-01-01 RX ADMIN — ACETAMINOPHEN 325MG 975 MG: 325 TABLET ORAL at 04:00

## 2023-01-01 RX ADMIN — SENNOSIDES AND DOCUSATE SODIUM 1 TABLET: 8.6; 5 TABLET ORAL at 20:29

## 2023-01-01 RX ADMIN — Medication 1 CAPSULE: at 21:57

## 2023-01-01 RX ADMIN — CEFEPIME HYDROCHLORIDE 2 G: 2 INJECTION, POWDER, FOR SOLUTION INTRAVENOUS at 00:29

## 2023-01-01 RX ADMIN — APIXABAN 5 MG: 5 TABLET, FILM COATED ORAL at 09:35

## 2023-01-01 RX ADMIN — Medication 1 CAPSULE: at 20:34

## 2023-01-01 RX ADMIN — PIOGLITAZONE HYDROCHLORIDE 45 MG: 45 TABLET ORAL at 10:11

## 2023-01-01 RX ADMIN — DOXYCYCLINE 100 MG: 100 CAPSULE ORAL at 05:06

## 2023-01-01 RX ADMIN — Medication 3 MG: at 21:25

## 2023-01-01 RX ADMIN — FUROSEMIDE 20 MG: 20 TABLET ORAL at 09:16

## 2023-01-01 RX ADMIN — MICONAZOLE NITRATE: 20 POWDER TOPICAL at 09:54

## 2023-01-01 RX ADMIN — Medication 5 ML: at 15:38

## 2023-01-01 RX ADMIN — METHOCARBAMOL 500 MG: 500 TABLET ORAL at 20:17

## 2023-01-01 RX ADMIN — MICONAZOLE NITRATE: 20 POWDER TOPICAL at 12:22

## 2023-01-01 RX ADMIN — Medication 50 MCG: at 09:37

## 2023-01-01 RX ADMIN — VANCOMYCIN HYDROCHLORIDE 1500 MG: 10 INJECTION, POWDER, LYOPHILIZED, FOR SOLUTION INTRAVENOUS at 11:37

## 2023-01-01 RX ADMIN — DICLOFENAC SODIUM 2 G: 10 GEL TOPICAL at 11:49

## 2023-01-01 RX ADMIN — ATORVASTATIN CALCIUM 40 MG: 40 TABLET, FILM COATED ORAL at 11:17

## 2023-01-01 RX ADMIN — SENNOSIDES AND DOCUSATE SODIUM 1 TABLET: 50; 8.6 TABLET ORAL at 09:35

## 2023-01-01 RX ADMIN — ACETAMINOPHEN 975 MG: 325 TABLET, FILM COATED ORAL at 21:28

## 2023-01-01 RX ADMIN — CIPROFLOXACIN HYDROCHLORIDE 500 MG: 500 TABLET, FILM COATED ORAL at 08:57

## 2023-01-01 RX ADMIN — OXYCODONE HYDROCHLORIDE 5 MG: 5 TABLET ORAL at 18:13

## 2023-01-01 RX ADMIN — APIXABAN 2.5 MG: 2.5 TABLET, FILM COATED ORAL at 21:56

## 2023-01-01 RX ADMIN — Medication 1 CAPSULE: at 08:06

## 2023-01-01 RX ADMIN — ACETAMINOPHEN 975 MG: 325 TABLET, FILM COATED ORAL at 06:07

## 2023-01-01 RX ADMIN — Medication 25 MG: at 08:19

## 2023-01-01 RX ADMIN — OXYCODONE HYDROCHLORIDE 10 MG: 10 TABLET ORAL at 14:42

## 2023-01-01 RX ADMIN — APIXABAN 5 MG: 2.5 TABLET, FILM COATED ORAL at 21:45

## 2023-01-01 RX ADMIN — MICONAZOLE NITRATE: 20 POWDER TOPICAL at 20:49

## 2023-01-01 RX ADMIN — MICONAZOLE NITRATE: 20 POWDER TOPICAL at 21:59

## 2023-01-01 RX ADMIN — AMLODIPINE BESYLATE 10 MG: 10 TABLET ORAL at 08:54

## 2023-01-01 RX ADMIN — Medication 5 ML: at 14:42

## 2023-01-01 RX ADMIN — OXYCODONE HYDROCHLORIDE 10 MG: 10 TABLET ORAL at 05:25

## 2023-01-01 RX ADMIN — TRAMADOL HYDROCHLORIDE 50 MG: 50 TABLET, COATED ORAL at 04:37

## 2023-01-01 RX ADMIN — APIXABAN 2.5 MG: 2.5 TABLET, FILM COATED ORAL at 08:48

## 2023-01-01 RX ADMIN — FUROSEMIDE 60 MG: 40 TABLET ORAL at 08:46

## 2023-01-01 RX ADMIN — ACETAMINOPHEN 650 MG: 325 TABLET, FILM COATED ORAL at 00:35

## 2023-01-01 RX ADMIN — APIXABAN 5 MG: 5 TABLET, FILM COATED ORAL at 19:45

## 2023-01-01 RX ADMIN — DICLOFENAC SODIUM 2 G: 10 GEL TOPICAL at 18:31

## 2023-01-01 RX ADMIN — MULTIPLE VITAMINS W/ MINERALS TAB 1 TABLET: TAB at 13:52

## 2023-01-01 RX ADMIN — ACETAMINOPHEN 1000 MG: 500 TABLET ORAL at 09:44

## 2023-01-01 RX ADMIN — INSULIN ASPART 1 UNITS: 100 INJECTION, SOLUTION INTRAVENOUS; SUBCUTANEOUS at 21:59

## 2023-01-01 RX ADMIN — LIDOCAINE PATCH 4% 1 PATCH: 40 PATCH TOPICAL at 08:54

## 2023-01-01 RX ADMIN — METHOCARBAMOL 500 MG: 500 TABLET ORAL at 20:14

## 2023-01-01 RX ADMIN — ACETAMINOPHEN 325MG 975 MG: 325 TABLET ORAL at 22:44

## 2023-01-01 RX ADMIN — SENNOSIDES AND DOCUSATE SODIUM 1 TABLET: 8.6; 5 TABLET ORAL at 20:56

## 2023-01-01 RX ADMIN — ACETAMINOPHEN 325MG 975 MG: 325 TABLET ORAL at 19:22

## 2023-01-01 RX ADMIN — Medication 25 MG: at 09:52

## 2023-01-01 RX ADMIN — CARVEDILOL 6.25 MG: 6.25 TABLET, FILM COATED ORAL at 09:47

## 2023-01-01 RX ADMIN — HEPARIN, PORCINE (PF) 10 UNIT/ML INTRAVENOUS SYRINGE 5 ML: at 23:50

## 2023-01-01 RX ADMIN — Medication 2.5 MG: at 13:21

## 2023-01-01 RX ADMIN — ATORVASTATIN CALCIUM 40 MG: 40 TABLET, FILM COATED ORAL at 12:48

## 2023-01-01 RX ADMIN — FUROSEMIDE 20 MG: 20 TABLET ORAL at 10:12

## 2023-01-01 RX ADMIN — MULTIPLE VITAMINS W/ MINERALS TAB 1 TABLET: TAB at 14:04

## 2023-01-01 RX ADMIN — FOLIC ACID 1 MG: 1 TABLET ORAL at 11:57

## 2023-01-01 RX ADMIN — ATORVASTATIN CALCIUM 40 MG: 40 TABLET, FILM COATED ORAL at 08:44

## 2023-01-01 RX ADMIN — FERROUS SULFATE TAB 325 MG (65 MG ELEMENTAL FE) 325 MG: 325 (65 FE) TAB at 09:02

## 2023-01-01 RX ADMIN — APIXABAN 5 MG: 2.5 TABLET, FILM COATED ORAL at 08:12

## 2023-01-01 RX ADMIN — CIPROFLOXACIN HYDROCHLORIDE 500 MG: 500 TABLET, FILM COATED ORAL at 21:07

## 2023-01-01 RX ADMIN — LIDOCAINE PATCH 4% 1 PATCH: 40 PATCH TOPICAL at 18:31

## 2023-01-01 RX ADMIN — SENNOSIDES AND DOCUSATE SODIUM 1 TABLET: 8.6; 5 TABLET ORAL at 20:24

## 2023-01-01 RX ADMIN — THIAMINE HCL TAB 100 MG 100 MG: 100 TAB at 08:54

## 2023-01-01 RX ADMIN — OXYCODONE HYDROCHLORIDE 10 MG: 10 TABLET ORAL at 11:30

## 2023-01-01 RX ADMIN — TRAMADOL HYDROCHLORIDE 50 MG: 50 TABLET, COATED ORAL at 10:20

## 2023-01-01 RX ADMIN — PIOGLITAZONE HYDROCHLORIDE 45 MG: 45 TABLET ORAL at 08:58

## 2023-01-01 RX ADMIN — METFORMIN ER 500 MG 2000 MG: 500 TABLET ORAL at 09:04

## 2023-01-01 RX ADMIN — AMLODIPINE BESYLATE 10 MG: 10 TABLET ORAL at 13:09

## 2023-01-01 RX ADMIN — Medication 50 MG: at 11:32

## 2023-01-01 RX ADMIN — HEPARIN, PORCINE (PF) 10 UNIT/ML INTRAVENOUS SYRINGE 5 ML: at 22:12

## 2023-01-01 RX ADMIN — INSULIN ASPART 1 UNITS: 100 INJECTION, SOLUTION INTRAVENOUS; SUBCUTANEOUS at 08:55

## 2023-01-01 RX ADMIN — APIXABAN 5 MG: 2.5 TABLET, FILM COATED ORAL at 10:57

## 2023-01-01 RX ADMIN — CIPROFLOXACIN HYDROCHLORIDE 750 MG: 250 TABLET, FILM COATED ORAL at 08:49

## 2023-01-01 RX ADMIN — PIOGLITAZONE HYDROCHLORIDE 45 MG: 45 TABLET ORAL at 08:51

## 2023-01-01 RX ADMIN — Medication 50 MCG: at 09:20

## 2023-01-01 RX ADMIN — ACETAMINOPHEN 1000 MG: 500 TABLET ORAL at 09:32

## 2023-01-01 RX ADMIN — MICONAZOLE NITRATE: 20 POWDER TOPICAL at 20:21

## 2023-01-01 RX ADMIN — CIPROFLOXACIN HYDROCHLORIDE 500 MG: 500 TABLET, FILM COATED ORAL at 08:58

## 2023-01-01 RX ADMIN — APIXABAN 2.5 MG: 2.5 TABLET, FILM COATED ORAL at 08:20

## 2023-01-01 RX ADMIN — ACETAMINOPHEN 1000 MG: 500 TABLET ORAL at 20:58

## 2023-01-01 RX ADMIN — ACETAMINOPHEN 1000 MG: 500 TABLET ORAL at 08:35

## 2023-01-01 RX ADMIN — Medication 2 G: at 16:37

## 2023-01-01 RX ADMIN — APIXABAN 5 MG: 2.5 TABLET, FILM COATED ORAL at 09:58

## 2023-01-01 RX ADMIN — DICLOFENAC SODIUM 2 G: 10 GEL TOPICAL at 16:09

## 2023-01-01 RX ADMIN — Medication 50 MCG: at 12:19

## 2023-01-01 RX ADMIN — ATORVASTATIN CALCIUM 40 MG: 40 TABLET, FILM COATED ORAL at 09:11

## 2023-01-01 RX ADMIN — ACETAMINOPHEN 975 MG: 325 TABLET, FILM COATED ORAL at 05:24

## 2023-01-01 RX ADMIN — CARVEDILOL 6.25 MG: 3.12 TABLET, FILM COATED ORAL at 17:52

## 2023-01-01 RX ADMIN — METFORMIN ER 500 MG 2000 MG: 500 TABLET ORAL at 08:49

## 2023-01-01 RX ADMIN — ACETAMINOPHEN 325MG 975 MG: 325 TABLET ORAL at 19:09

## 2023-01-01 RX ADMIN — Medication 1 CAPSULE: at 08:24

## 2023-01-01 RX ADMIN — ATORVASTATIN CALCIUM 40 MG: 40 TABLET, FILM COATED ORAL at 10:11

## 2023-01-01 RX ADMIN — DOXYCYCLINE 100 MG: 100 CAPSULE ORAL at 09:13

## 2023-01-01 RX ADMIN — Medication 1 CAPSULE: at 08:31

## 2023-01-01 RX ADMIN — FERROUS SULFATE TAB 325 MG (65 MG ELEMENTAL FE) 325 MG: 325 (65 FE) TAB at 12:14

## 2023-01-01 RX ADMIN — MULTIPLE VITAMINS W/ MINERALS TAB 1 TABLET: TAB at 13:20

## 2023-01-01 RX ADMIN — PIOGLITAZONE HYDROCHLORIDE 45 MG: 45 TABLET ORAL at 11:08

## 2023-01-01 RX ADMIN — Medication 50 MG: at 09:09

## 2023-01-01 RX ADMIN — CEFTAZIDIME 2 G: 2 INJECTION, POWDER, FOR SOLUTION INTRAVENOUS at 21:40

## 2023-01-01 RX ADMIN — VANCOMYCIN HYDROCHLORIDE 2000 MG: 1 INJECTION, POWDER, LYOPHILIZED, FOR SOLUTION INTRAVENOUS at 10:07

## 2023-01-01 RX ADMIN — METHOCARBAMOL 500 MG: 500 TABLET ORAL at 10:07

## 2023-01-01 RX ADMIN — APIXABAN 5 MG: 2.5 TABLET, FILM COATED ORAL at 20:52

## 2023-01-01 RX ADMIN — METHOCARBAMOL 500 MG: 500 TABLET ORAL at 10:22

## 2023-01-01 RX ADMIN — APIXABAN 2.5 MG: 2.5 TABLET, FILM COATED ORAL at 08:42

## 2023-01-01 RX ADMIN — POLYETHYLENE GLYCOL 3350 17 G: 17 POWDER, FOR SOLUTION ORAL at 08:12

## 2023-01-01 RX ADMIN — ATORVASTATIN CALCIUM 40 MG: 40 TABLET, FILM COATED ORAL at 10:59

## 2023-01-01 RX ADMIN — HUMAN ALBUMIN MICROSPHERES AND PERFLUTREN 5 ML: 10; .22 INJECTION, SOLUTION INTRAVENOUS at 15:58

## 2023-01-01 RX ADMIN — Medication 5 ML: at 14:03

## 2023-01-01 RX ADMIN — SENNOSIDES AND DOCUSATE SODIUM 1 TABLET: 50; 8.6 TABLET ORAL at 20:08

## 2023-01-01 RX ADMIN — Medication 1 CAPSULE: at 11:06

## 2023-01-01 RX ADMIN — MAGNESIUM SULFATE HEPTAHYDRATE 1 G: 1 INJECTION, SOLUTION INTRAVENOUS at 16:12

## 2023-01-01 RX ADMIN — ATORVASTATIN CALCIUM 40 MG: 40 TABLET, FILM COATED ORAL at 09:54

## 2023-01-01 RX ADMIN — DOCUSATE SODIUM 100 MG: 100 CAPSULE, LIQUID FILLED ORAL at 21:59

## 2023-01-01 RX ADMIN — POLYETHYLENE GLYCOL 3350 17 G: 17 POWDER, FOR SOLUTION ORAL at 09:01

## 2023-01-01 RX ADMIN — PIOGLITAZONE HYDROCHLORIDE 45 MG: 45 TABLET ORAL at 10:09

## 2023-01-01 RX ADMIN — CEFTRIAXONE SODIUM 2 G: 2 INJECTION, POWDER, FOR SOLUTION INTRAMUSCULAR; INTRAVENOUS at 21:50

## 2023-01-01 RX ADMIN — FOLIC ACID 1 MG: 1 TABLET ORAL at 08:12

## 2023-01-01 RX ADMIN — MULTIPLE VITAMINS W/ MINERALS TAB 1 TABLET: TAB at 13:36

## 2023-01-01 RX ADMIN — SENNOSIDES AND DOCUSATE SODIUM 1 TABLET: 50; 8.6 TABLET ORAL at 23:29

## 2023-01-01 RX ADMIN — HEPARIN, PORCINE (PF) 10 UNIT/ML INTRAVENOUS SYRINGE 5 ML: at 13:10

## 2023-01-01 RX ADMIN — ATORVASTATIN CALCIUM 40 MG: 40 TABLET, FILM COATED ORAL at 09:06

## 2023-01-01 RX ADMIN — FOLIC ACID 1 MG: 1 TABLET ORAL at 13:52

## 2023-01-01 RX ADMIN — Medication 1 CAPSULE: at 21:10

## 2023-01-01 RX ADMIN — LIDOCAINE HYDROCHLORIDE 100 MG: 20 INJECTION, SOLUTION INFILTRATION; PERINEURAL at 12:53

## 2023-01-01 RX ADMIN — ACETAMINOPHEN 1000 MG: 325 TABLET ORAL at 08:07

## 2023-01-01 RX ADMIN — SODIUM CHLORIDE 500 ML: 9 INJECTION, SOLUTION INTRAVENOUS at 11:28

## 2023-01-01 RX ADMIN — CEFTAZIDIME 2 G: 2 INJECTION, POWDER, FOR SOLUTION INTRAVENOUS at 21:11

## 2023-01-01 RX ADMIN — APIXABAN 5 MG: 2.5 TABLET, FILM COATED ORAL at 10:54

## 2023-01-01 RX ADMIN — ACETAMINOPHEN 650 MG: 325 TABLET, FILM COATED ORAL at 03:26

## 2023-01-01 RX ADMIN — ACETAMINOPHEN 975 MG: 325 TABLET, FILM COATED ORAL at 21:55

## 2023-01-01 RX ADMIN — METHOCARBAMOL 500 MG: 500 TABLET ORAL at 14:41

## 2023-01-01 RX ADMIN — FOLIC ACID 1 MG: 1 TABLET ORAL at 08:55

## 2023-01-01 RX ADMIN — SODIUM CHLORIDE, POTASSIUM CHLORIDE, SODIUM LACTATE AND CALCIUM CHLORIDE 1000 ML: 600; 310; 30; 20 INJECTION, SOLUTION INTRAVENOUS at 12:53

## 2023-01-01 RX ADMIN — ACETAMINOPHEN 1000 MG: 325 TABLET ORAL at 18:55

## 2023-01-01 RX ADMIN — DEXTROSE MONOHYDRATE: 50 INJECTION, SOLUTION INTRAVENOUS at 10:26

## 2023-01-01 RX ADMIN — METFORMIN ER 500 MG 2000 MG: 500 TABLET ORAL at 08:29

## 2023-01-01 RX ADMIN — Medication 50 MCG: at 07:58

## 2023-01-01 RX ADMIN — METHOCARBAMOL 500 MG: 500 TABLET ORAL at 19:09

## 2023-01-01 RX ADMIN — CEFTAZIDIME 2 G: 2 INJECTION, POWDER, FOR SOLUTION INTRAVENOUS at 21:45

## 2023-01-01 RX ADMIN — FOLIC ACID 1 MG: 1 TABLET ORAL at 09:53

## 2023-01-01 RX ADMIN — POLYETHYLENE GLYCOL 3350 17 G: 17 POWDER, FOR SOLUTION ORAL at 08:59

## 2023-01-01 RX ADMIN — MICONAZOLE NITRATE: 20 POWDER TOPICAL at 14:53

## 2023-01-01 RX ADMIN — FUROSEMIDE 20 MG: 20 TABLET ORAL at 09:24

## 2023-01-01 RX ADMIN — CIPROFLOXACIN HYDROCHLORIDE 500 MG: 500 TABLET, FILM COATED ORAL at 17:59

## 2023-01-01 RX ADMIN — ACETAMINOPHEN 975 MG: 325 TABLET, FILM COATED ORAL at 06:42

## 2023-01-01 RX ADMIN — METFORMIN ER 500 MG 2000 MG: 500 TABLET ORAL at 10:57

## 2023-01-01 RX ADMIN — ACETAMINOPHEN 325MG 975 MG: 325 TABLET ORAL at 14:31

## 2023-01-01 RX ADMIN — CEFTAZIDIME 2 G: 2 INJECTION, POWDER, FOR SOLUTION INTRAVENOUS at 22:08

## 2023-01-01 RX ADMIN — CIPROFLOXACIN HYDROCHLORIDE 750 MG: 250 TABLET, FILM COATED ORAL at 15:25

## 2023-01-01 RX ADMIN — APIXABAN 5 MG: 2.5 TABLET, FILM COATED ORAL at 10:23

## 2023-01-01 RX ADMIN — APIXABAN 5 MG: 2.5 TABLET, FILM COATED ORAL at 22:01

## 2023-01-01 RX ADMIN — CEFTAZIDIME 2 G: 2 INJECTION, POWDER, FOR SOLUTION INTRAVENOUS at 19:43

## 2023-01-01 RX ADMIN — APIXABAN 5 MG: 2.5 TABLET, FILM COATED ORAL at 20:26

## 2023-01-01 RX ADMIN — Medication 1 CAPSULE: at 10:44

## 2023-01-01 RX ADMIN — LIDOCAINE PATCH 4% 1 PATCH: 40 PATCH TOPICAL at 20:12

## 2023-01-01 RX ADMIN — AMLODIPINE BESYLATE 10 MG: 10 TABLET ORAL at 12:19

## 2023-01-01 RX ADMIN — ACETAMINOPHEN 650 MG: 325 TABLET, FILM COATED ORAL at 16:57

## 2023-01-01 RX ADMIN — FOLIC ACID 1 MG: 1 TABLET ORAL at 10:18

## 2023-01-01 RX ADMIN — FUROSEMIDE 20 MG: 20 TABLET ORAL at 11:08

## 2023-01-01 RX ADMIN — Medication 3 MG: at 22:27

## 2023-01-01 RX ADMIN — Medication 5 MG: at 11:01

## 2023-01-01 RX ADMIN — MULTIPLE VITAMINS W/ MINERALS TAB 1 TABLET: TAB at 11:09

## 2023-01-01 RX ADMIN — Medication 25 MG: at 12:48

## 2023-01-01 RX ADMIN — METFORMIN ER 500 MG 2000 MG: 500 TABLET ORAL at 10:20

## 2023-01-01 RX ADMIN — METFORMIN HYDROCHLORIDE 2000 MG: 500 TABLET, EXTENDED RELEASE ORAL at 08:40

## 2023-01-01 RX ADMIN — HEPARIN, PORCINE (PF) 10 UNIT/ML INTRAVENOUS SYRINGE 5 ML: at 22:23

## 2023-01-01 RX ADMIN — CARVEDILOL 6.25 MG: 3.12 TABLET, FILM COATED ORAL at 09:23

## 2023-01-01 RX ADMIN — Medication 5 ML: at 14:21

## 2023-01-01 RX ADMIN — ACETAMINOPHEN 975 MG: 325 TABLET, FILM COATED ORAL at 14:05

## 2023-01-01 RX ADMIN — PIOGLITAZONE HYDROCHLORIDE 45 MG: 45 TABLET ORAL at 13:35

## 2023-01-01 RX ADMIN — Medication 5 ML: at 14:00

## 2023-01-01 RX ADMIN — CARVEDILOL 6.25 MG: 6.25 TABLET, FILM COATED ORAL at 09:38

## 2023-01-01 RX ADMIN — SODIUM CHLORIDE: 0.9 INJECTION, SOLUTION INTRAVENOUS at 15:00

## 2023-01-01 RX ADMIN — THIAMINE HCL TAB 100 MG 100 MG: 100 TAB at 13:03

## 2023-01-01 RX ADMIN — OXYCODONE HYDROCHLORIDE 10 MG: 5 TABLET ORAL at 05:47

## 2023-01-01 RX ADMIN — ACETAMINOPHEN 1000 MG: 325 TABLET ORAL at 18:18

## 2023-01-01 RX ADMIN — FUROSEMIDE 60 MG: 40 TABLET ORAL at 09:30

## 2023-01-01 RX ADMIN — THIAMINE HCL TAB 100 MG 100 MG: 100 TAB at 13:52

## 2023-01-01 RX ADMIN — PIOGLITAZONE HYDROCHLORIDE 45 MG: 45 TABLET ORAL at 10:39

## 2023-01-01 RX ADMIN — OXYCODONE HYDROCHLORIDE 5 MG: 5 TABLET ORAL at 03:19

## 2023-01-01 RX ADMIN — PIOGLITAZONE HYDROCHLORIDE 45 MG: 45 TABLET ORAL at 10:41

## 2023-01-01 RX ADMIN — ACETAMINOPHEN 975 MG: 325 TABLET, FILM COATED ORAL at 11:30

## 2023-01-01 RX ADMIN — CIPROFLOXACIN HYDROCHLORIDE 500 MG: 500 TABLET, FILM COATED ORAL at 17:36

## 2023-01-01 RX ADMIN — ACETAMINOPHEN 975 MG: 325 TABLET, FILM COATED ORAL at 09:12

## 2023-01-01 RX ADMIN — LIRAGLUTIDE 1.8 MG: 6 INJECTION SUBCUTANEOUS at 10:33

## 2023-01-01 RX ADMIN — METHOCARBAMOL 500 MG: 500 TABLET ORAL at 12:04

## 2023-01-01 RX ADMIN — ACETAMINOPHEN 650 MG: 325 TABLET, FILM COATED ORAL at 15:25

## 2023-01-01 RX ADMIN — THIAMINE HCL TAB 100 MG 100 MG: 100 TAB at 13:49

## 2023-01-01 RX ADMIN — CARVEDILOL 6.25 MG: 6.25 TABLET, FILM COATED ORAL at 11:41

## 2023-01-01 RX ADMIN — APIXABAN 2.5 MG: 2.5 TABLET, FILM COATED ORAL at 19:09

## 2023-01-01 RX ADMIN — METHOCARBAMOL 500 MG: 500 TABLET ORAL at 11:45

## 2023-01-01 RX ADMIN — SODIUM CHLORIDE 500 ML: 9 INJECTION, SOLUTION INTRAVENOUS at 22:44

## 2023-01-01 RX ADMIN — AMLODIPINE BESYLATE 10 MG: 10 TABLET ORAL at 08:24

## 2023-01-01 RX ADMIN — CARVEDILOL 6.25 MG: 3.12 TABLET, FILM COATED ORAL at 17:34

## 2023-01-01 RX ADMIN — DOXYCYCLINE HYCLATE 100 MG: 100 CAPSULE ORAL at 08:43

## 2023-01-01 RX ADMIN — MULTIPLE VITAMINS W/ MINERALS TAB 1 TABLET: TAB at 11:11

## 2023-01-01 RX ADMIN — SODIUM CHLORIDE 500 ML: 9 INJECTION, SOLUTION INTRAVENOUS at 00:40

## 2023-01-01 RX ADMIN — DEXTROSE MONOHYDRATE: 50 INJECTION, SOLUTION INTRAVENOUS at 05:42

## 2023-01-01 RX ADMIN — THIAMINE HCL TAB 100 MG 100 MG: 100 TAB at 11:41

## 2023-01-01 RX ADMIN — SENNOSIDES AND DOCUSATE SODIUM 1 TABLET: 50; 8.6 TABLET ORAL at 19:58

## 2023-01-01 RX ADMIN — PIOGLITAZONE HYDROCHLORIDE 45 MG: 45 TABLET ORAL at 09:04

## 2023-01-01 RX ADMIN — MICONAZOLE NITRATE: 20 POWDER TOPICAL at 21:32

## 2023-01-01 RX ADMIN — Medication 1 CAPSULE: at 10:58

## 2023-01-01 RX ADMIN — MICONAZOLE NITRATE: 20 POWDER TOPICAL at 21:48

## 2023-01-01 RX ADMIN — MICONAZOLE NITRATE: 20 POWDER TOPICAL at 11:26

## 2023-01-01 RX ADMIN — ATORVASTATIN CALCIUM 40 MG: 40 TABLET, FILM COATED ORAL at 08:42

## 2023-01-01 RX ADMIN — ACETAMINOPHEN 1000 MG: 325 TABLET ORAL at 02:22

## 2023-01-01 RX ADMIN — Medication 50 MCG: at 13:17

## 2023-01-01 RX ADMIN — ACETAMINOPHEN 325MG 975 MG: 325 TABLET ORAL at 15:58

## 2023-01-01 RX ADMIN — METHOCARBAMOL 500 MG: 500 TABLET ORAL at 12:23

## 2023-01-01 RX ADMIN — FUROSEMIDE 20 MG: 20 TABLET ORAL at 10:01

## 2023-01-01 RX ADMIN — PIOGLITAZONE HYDROCHLORIDE 45 MG: 45 TABLET ORAL at 10:59

## 2023-01-01 RX ADMIN — VANCOMYCIN HYDROCHLORIDE 1000 MG: 1 INJECTION, SOLUTION INTRAVENOUS at 13:34

## 2023-01-01 RX ADMIN — LIRAGLUTIDE 1.8 MG: 6 INJECTION SUBCUTANEOUS at 10:53

## 2023-01-01 RX ADMIN — SENNOSIDES AND DOCUSATE SODIUM 1 TABLET: 8.6; 5 TABLET ORAL at 21:43

## 2023-01-01 RX ADMIN — METHOCARBAMOL 500 MG: 500 TABLET ORAL at 08:44

## 2023-01-01 RX ADMIN — Medication 2 G: at 09:53

## 2023-01-01 RX ADMIN — MICONAZOLE NITRATE: 20 POWDER TOPICAL at 09:38

## 2023-01-01 RX ADMIN — SODIUM CHLORIDE, SODIUM LACTATE, POTASSIUM CHLORIDE, CALCIUM CHLORIDE AND DEXTROSE MONOHYDRATE: 5; 600; 310; 30; 20 INJECTION, SOLUTION INTRAVENOUS at 13:51

## 2023-01-01 RX ADMIN — METFORMIN ER 500 MG 2000 MG: 500 TABLET ORAL at 10:51

## 2023-01-01 RX ADMIN — METRONIDAZOLE 500 MG: 500 TABLET ORAL at 14:22

## 2023-01-01 RX ADMIN — SENNOSIDES AND DOCUSATE SODIUM 1 TABLET: 8.6; 5 TABLET ORAL at 08:49

## 2023-01-01 RX ADMIN — OXYCODONE HYDROCHLORIDE 10 MG: 5 TABLET ORAL at 17:40

## 2023-01-01 RX ADMIN — OXYCODONE HYDROCHLORIDE 5 MG: 5 TABLET ORAL at 21:06

## 2023-01-01 RX ADMIN — METFORMIN ER 500 MG 2000 MG: 500 TABLET ORAL at 10:35

## 2023-01-01 RX ADMIN — ACETAMINOPHEN 650 MG: 325 TABLET, FILM COATED ORAL at 17:32

## 2023-01-01 RX ADMIN — MULTIPLE VITAMINS W/ MINERALS TAB 1 TABLET: TAB at 12:40

## 2023-01-01 RX ADMIN — APIXABAN 5 MG: 2.5 TABLET, FILM COATED ORAL at 09:08

## 2023-01-01 RX ADMIN — Medication 3 MG: at 22:16

## 2023-01-01 RX ADMIN — DICLOFENAC SODIUM 2 G: 10 GEL TOPICAL at 15:38

## 2023-01-01 RX ADMIN — CARVEDILOL 6.25 MG: 6.25 TABLET, FILM COATED ORAL at 18:32

## 2023-01-01 RX ADMIN — SENNOSIDES AND DOCUSATE SODIUM 1 TABLET: 8.6; 5 TABLET ORAL at 23:11

## 2023-01-01 RX ADMIN — OXYCODONE HYDROCHLORIDE 5 MG: 5 TABLET ORAL at 22:47

## 2023-01-01 RX ADMIN — METHOCARBAMOL 500 MG: 500 TABLET ORAL at 10:17

## 2023-01-01 RX ADMIN — Medication 25 MG: at 10:32

## 2023-01-01 RX ADMIN — Medication 50 MCG: at 12:32

## 2023-01-01 RX ADMIN — ACETAMINOPHEN 1000 MG: 500 TABLET ORAL at 09:54

## 2023-01-01 RX ADMIN — OXYCODONE HYDROCHLORIDE 5 MG: 5 TABLET ORAL at 22:31

## 2023-01-01 RX ADMIN — Medication 25 MG: at 10:06

## 2023-01-01 RX ADMIN — Medication 1 CAPSULE: at 20:02

## 2023-01-01 RX ADMIN — FUROSEMIDE 60 MG: 40 TABLET ORAL at 09:00

## 2023-01-01 RX ADMIN — ACETAMINOPHEN 1000 MG: 500 TABLET ORAL at 10:03

## 2023-01-01 RX ADMIN — Medication 1 CAPSULE: at 11:31

## 2023-01-01 RX ADMIN — PIOGLITAZONE HYDROCHLORIDE 45 MG: 45 TABLET ORAL at 10:34

## 2023-01-01 RX ADMIN — THIAMINE HCL TAB 100 MG 100 MG: 100 TAB at 13:17

## 2023-01-01 RX ADMIN — ACETAMINOPHEN 325MG 975 MG: 325 TABLET ORAL at 14:59

## 2023-01-01 RX ADMIN — APIXABAN 2.5 MG: 2.5 TABLET, FILM COATED ORAL at 09:26

## 2023-01-01 RX ADMIN — METFORMIN ER 500 MG 2000 MG: 500 TABLET ORAL at 10:05

## 2023-01-01 RX ADMIN — Medication 1 CAPSULE: at 19:44

## 2023-01-01 RX ADMIN — CARVEDILOL 3.12 MG: 3.12 TABLET, FILM COATED ORAL at 18:58

## 2023-01-01 RX ADMIN — METHOCARBAMOL 500 MG: 500 TABLET ORAL at 09:21

## 2023-01-01 RX ADMIN — MULTIPLE VITAMINS W/ MINERALS TAB 1 TABLET: TAB at 14:47

## 2023-01-01 RX ADMIN — METFORMIN HYDROCHLORIDE 2000 MG: 500 TABLET, EXTENDED RELEASE ORAL at 11:01

## 2023-01-01 RX ADMIN — Medication 25 MG: at 16:44

## 2023-01-01 RX ADMIN — CIPROFLOXACIN HYDROCHLORIDE 500 MG: 500 TABLET, FILM COATED ORAL at 08:20

## 2023-01-01 RX ADMIN — MAGNESIUM OXIDE TAB 400 MG (241.3 MG ELEMENTAL MG) 400 MG: 400 (241.3 MG) TAB at 17:29

## 2023-01-01 RX ADMIN — PIOGLITAZONE HYDROCHLORIDE 45 MG: 45 TABLET ORAL at 08:49

## 2023-01-01 RX ADMIN — FERROUS SULFATE TAB 325 MG (65 MG ELEMENTAL FE) 325 MG: 325 (65 FE) TAB at 12:41

## 2023-01-01 RX ADMIN — ACETAMINOPHEN 975 MG: 325 TABLET, FILM COATED ORAL at 12:36

## 2023-01-01 RX ADMIN — OXYCODONE HYDROCHLORIDE 5 MG: 5 TABLET ORAL at 02:38

## 2023-01-01 RX ADMIN — AMLODIPINE BESYLATE 5 MG: 5 TABLET ORAL at 10:41

## 2023-01-01 RX ADMIN — VANCOMYCIN HYDROCHLORIDE 1500 MG: 10 INJECTION, POWDER, LYOPHILIZED, FOR SOLUTION INTRAVENOUS at 17:42

## 2023-01-01 RX ADMIN — FOLIC ACID 1 MG: 1 TABLET ORAL at 13:17

## 2023-01-01 RX ADMIN — DOXYCYCLINE HYCLATE 100 MG: 100 CAPSULE ORAL at 08:06

## 2023-01-01 RX ADMIN — CARVEDILOL 6.25 MG: 6.25 TABLET, FILM COATED ORAL at 17:52

## 2023-01-01 RX ADMIN — VANCOMYCIN HYDROCHLORIDE 500 MG: 500 INJECTION, POWDER, LYOPHILIZED, FOR SOLUTION INTRAVENOUS at 15:50

## 2023-01-01 RX ADMIN — MICONAZOLE NITRATE: 20 POWDER TOPICAL at 14:44

## 2023-01-01 RX ADMIN — ATORVASTATIN CALCIUM 40 MG: 40 TABLET, FILM COATED ORAL at 09:13

## 2023-01-01 RX ADMIN — Medication 25 MG: at 10:34

## 2023-01-01 RX ADMIN — Medication 50 MCG: at 11:41

## 2023-01-01 RX ADMIN — Medication 50 MG: at 09:05

## 2023-01-01 RX ADMIN — FERROUS SULFATE TAB 325 MG (65 MG ELEMENTAL FE) 325 MG: 325 (65 FE) TAB at 11:13

## 2023-01-01 RX ADMIN — DOXYCYCLINE 100 MG: 100 CAPSULE ORAL at 10:33

## 2023-01-01 RX ADMIN — CIPROFLOXACIN HYDROCHLORIDE 500 MG: 500 TABLET, FILM COATED ORAL at 19:46

## 2023-01-01 RX ADMIN — METRONIDAZOLE 500 MG: 500 TABLET ORAL at 14:06

## 2023-01-01 RX ADMIN — ATORVASTATIN CALCIUM 40 MG: 40 TABLET, FILM COATED ORAL at 10:34

## 2023-01-01 RX ADMIN — ATORVASTATIN CALCIUM 40 MG: 40 TABLET, FILM COATED ORAL at 10:41

## 2023-01-01 RX ADMIN — CARVEDILOL 6.25 MG: 6.25 TABLET, FILM COATED ORAL at 18:50

## 2023-01-01 RX ADMIN — OXYCODONE HYDROCHLORIDE 5 MG: 5 TABLET ORAL at 21:27

## 2023-01-01 RX ADMIN — MULTIPLE VITAMINS W/ MINERALS TAB 1 TABLET: TAB at 11:20

## 2023-01-01 RX ADMIN — Medication 1 CAPSULE: at 22:18

## 2023-01-01 RX ADMIN — METFORMIN HYDROCHLORIDE 2000 MG: 500 TABLET, EXTENDED RELEASE ORAL at 10:33

## 2023-01-01 RX ADMIN — MICONAZOLE NITRATE: 20 POWDER TOPICAL at 10:53

## 2023-01-01 RX ADMIN — ACETAMINOPHEN 650 MG: 325 TABLET, FILM COATED ORAL at 22:41

## 2023-01-01 RX ADMIN — APIXABAN 5 MG: 2.5 TABLET, FILM COATED ORAL at 08:57

## 2023-01-01 RX ADMIN — APIXABAN 5 MG: 2.5 TABLET, FILM COATED ORAL at 08:10

## 2023-01-01 RX ADMIN — LIRAGLUTIDE 1.8 MG: 6 INJECTION SUBCUTANEOUS at 10:34

## 2023-01-01 RX ADMIN — CEFAZOLIN SODIUM 2 G: 2 INJECTION, SOLUTION INTRAVENOUS at 22:35

## 2023-01-01 RX ADMIN — MICONAZOLE NITRATE: 20 POWDER TOPICAL at 21:51

## 2023-01-01 RX ADMIN — OXYCODONE HYDROCHLORIDE 10 MG: 5 TABLET ORAL at 19:10

## 2023-01-01 RX ADMIN — ACETAMINOPHEN 1000 MG: 500 TABLET ORAL at 08:44

## 2023-01-01 RX ADMIN — LIRAGLUTIDE 1.8 MG: 6 INJECTION SUBCUTANEOUS at 09:34

## 2023-01-01 RX ADMIN — LIRAGLUTIDE 1.8 MG: 6 INJECTION SUBCUTANEOUS at 08:44

## 2023-01-01 RX ADMIN — TRAMADOL HYDROCHLORIDE 50 MG: 50 TABLET, COATED ORAL at 18:27

## 2023-01-01 RX ADMIN — METFORMIN ER 500 MG 2000 MG: 500 TABLET ORAL at 12:43

## 2023-01-01 RX ADMIN — PIOGLITAZONE HYDROCHLORIDE 45 MG: 45 TABLET ORAL at 09:08

## 2023-01-01 RX ADMIN — FOLIC ACID 1 MG: 1 TABLET ORAL at 09:25

## 2023-01-01 RX ADMIN — MICONAZOLE NITRATE: 20 POWDER TOPICAL at 21:57

## 2023-01-01 RX ADMIN — APIXABAN 5 MG: 2.5 TABLET, FILM COATED ORAL at 08:27

## 2023-01-01 RX ADMIN — OXYCODONE HYDROCHLORIDE 10 MG: 5 TABLET ORAL at 10:07

## 2023-01-01 RX ADMIN — METHOCARBAMOL 500 MG: 500 TABLET ORAL at 16:31

## 2023-01-01 RX ADMIN — MICONAZOLE NITRATE: 20 POWDER TOPICAL at 10:19

## 2023-01-01 RX ADMIN — METHOCARBAMOL 500 MG: 500 TABLET ORAL at 23:29

## 2023-01-01 RX ADMIN — FOLIC ACID 1 MG: 1 TABLET ORAL at 13:03

## 2023-01-01 RX ADMIN — AMLODIPINE BESYLATE 5 MG: 5 TABLET ORAL at 08:32

## 2023-01-01 RX ADMIN — POLYETHYLENE GLYCOL 3350 17 G: 17 POWDER, FOR SOLUTION ORAL at 09:12

## 2023-01-01 RX ADMIN — MULTIPLE VITAMINS W/ MINERALS TAB 1 TABLET: TAB at 19:32

## 2023-01-01 RX ADMIN — CARVEDILOL 6.25 MG: 6.25 TABLET, FILM COATED ORAL at 11:37

## 2023-01-01 RX ADMIN — DOXYCYCLINE 100 MG: 100 CAPSULE ORAL at 21:04

## 2023-01-01 RX ADMIN — METFORMIN ER 500 MG 2000 MG: 500 TABLET ORAL at 12:46

## 2023-01-01 RX ADMIN — ACETAMINOPHEN 650 MG: 325 TABLET, FILM COATED ORAL at 08:40

## 2023-01-01 RX ADMIN — MULTIPLE VITAMINS W/ MINERALS TAB 1 TABLET: TAB at 11:54

## 2023-01-01 RX ADMIN — CARVEDILOL 6.25 MG: 6.25 TABLET, FILM COATED ORAL at 10:33

## 2023-01-01 RX ADMIN — SENNOSIDES AND DOCUSATE SODIUM 1 TABLET: 8.6; 5 TABLET ORAL at 08:12

## 2023-01-01 RX ADMIN — SENNOSIDES AND DOCUSATE SODIUM 1 TABLET: 8.6; 5 TABLET ORAL at 08:19

## 2023-01-01 RX ADMIN — MICONAZOLE NITRATE: 20 POWDER TOPICAL at 09:22

## 2023-01-01 RX ADMIN — VANCOMYCIN HYDROCHLORIDE 750 MG: 1 INJECTION, POWDER, LYOPHILIZED, FOR SOLUTION INTRAVENOUS at 15:07

## 2023-01-01 RX ADMIN — THIAMINE HCL TAB 100 MG 100 MG: 100 TAB at 13:28

## 2023-01-01 RX ADMIN — PHENYLEPHRINE HYDROCHLORIDE 0.5 MCG/KG/MIN: 10 INJECTION INTRAVENOUS at 16:16

## 2023-01-01 RX ADMIN — DICLOFENAC SODIUM 2 G: 10 GEL TOPICAL at 20:11

## 2023-01-01 RX ADMIN — FOLIC ACID 1 MG: 1 TABLET ORAL at 12:32

## 2023-01-01 RX ADMIN — LIRAGLUTIDE 1.8 MG: 6 INJECTION SUBCUTANEOUS at 09:59

## 2023-01-01 RX ADMIN — AMLODIPINE BESYLATE 5 MG: 5 TABLET ORAL at 08:20

## 2023-01-01 RX ADMIN — METHOCARBAMOL 500 MG: 500 TABLET ORAL at 15:16

## 2023-01-01 RX ADMIN — APIXABAN 5 MG: 2.5 TABLET, FILM COATED ORAL at 20:29

## 2023-01-01 RX ADMIN — GABAPENTIN 300 MG: 300 CAPSULE ORAL at 22:10

## 2023-01-01 RX ADMIN — Medication 5 ML: at 15:57

## 2023-01-01 RX ADMIN — Medication 25 MG: at 11:09

## 2023-01-01 RX ADMIN — SENNOSIDES AND DOCUSATE SODIUM 1 TABLET: 8.6; 5 TABLET ORAL at 08:43

## 2023-01-01 RX ADMIN — MICONAZOLE NITRATE: 20 POWDER TOPICAL at 21:01

## 2023-01-01 RX ADMIN — FENTANYL CITRATE 50 MCG: 50 INJECTION, SOLUTION INTRAMUSCULAR; INTRAVENOUS at 14:24

## 2023-01-01 RX ADMIN — APIXABAN 5 MG: 2.5 TABLET, FILM COATED ORAL at 21:25

## 2023-01-01 RX ADMIN — FOLIC ACID 1 MG: 1 TABLET ORAL at 11:40

## 2023-01-01 RX ADMIN — ACETAMINOPHEN 975 MG: 325 TABLET ORAL at 11:32

## 2023-01-01 RX ADMIN — Medication 2 G: at 09:09

## 2023-01-01 RX ADMIN — APIXABAN 5 MG: 5 TABLET, FILM COATED ORAL at 22:44

## 2023-01-01 RX ADMIN — ACETAMINOPHEN 975 MG: 325 TABLET, FILM COATED ORAL at 06:00

## 2023-01-01 RX ADMIN — MICONAZOLE NITRATE: 20 POWDER TOPICAL at 11:23

## 2023-01-01 RX ADMIN — ATORVASTATIN CALCIUM 40 MG: 40 TABLET, FILM COATED ORAL at 09:34

## 2023-01-01 RX ADMIN — AMLODIPINE BESYLATE 10 MG: 10 TABLET ORAL at 09:07

## 2023-01-01 RX ADMIN — APIXABAN 5 MG: 2.5 TABLET, FILM COATED ORAL at 21:12

## 2023-01-01 RX ADMIN — FOLIC ACID 1 MG: 1 TABLET ORAL at 08:51

## 2023-01-01 RX ADMIN — HEPARIN SODIUM 5000 UNITS: 5000 INJECTION, SOLUTION INTRAVENOUS; SUBCUTANEOUS at 08:31

## 2023-01-01 RX ADMIN — DOXYCYCLINE HYCLATE 100 MG: 100 CAPSULE ORAL at 08:45

## 2023-01-01 RX ADMIN — APIXABAN 5 MG: 2.5 TABLET, FILM COATED ORAL at 20:30

## 2023-01-01 RX ADMIN — CIPROFLOXACIN HYDROCHLORIDE 500 MG: 500 TABLET, FILM COATED ORAL at 11:04

## 2023-01-01 RX ADMIN — MICONAZOLE NITRATE: 20 POWDER TOPICAL at 20:27

## 2023-01-01 RX ADMIN — LIRAGLUTIDE 1.8 MG: 6 INJECTION SUBCUTANEOUS at 10:23

## 2023-01-01 RX ADMIN — Medication 7.5 MG: at 08:47

## 2023-01-01 RX ADMIN — Medication 25 MG: at 16:32

## 2023-01-01 RX ADMIN — APIXABAN 2.5 MG: 2.5 TABLET, FILM COATED ORAL at 09:18

## 2023-01-01 RX ADMIN — MULTIPLE VITAMINS W/ MINERALS TAB 1 TABLET: TAB at 12:07

## 2023-01-01 RX ADMIN — Medication 5 ML: at 14:58

## 2023-01-01 RX ADMIN — TRAMADOL HYDROCHLORIDE 50 MG: 50 TABLET, COATED ORAL at 20:16

## 2023-01-01 RX ADMIN — FERROUS SULFATE TAB 325 MG (65 MG ELEMENTAL FE) 325 MG: 325 (65 FE) TAB at 12:05

## 2023-01-01 RX ADMIN — MULTIPLE VITAMINS W/ MINERALS TAB 1 TABLET: TAB at 09:02

## 2023-01-01 RX ADMIN — ACETAMINOPHEN 650 MG: 325 TABLET, FILM COATED ORAL at 01:29

## 2023-01-01 RX ADMIN — Medication 25 MG: at 22:08

## 2023-01-01 RX ADMIN — MULTIPLE VITAMINS W/ MINERALS TAB 1 TABLET: TAB at 12:18

## 2023-01-01 RX ADMIN — OXYCODONE HYDROCHLORIDE 5 MG: 5 TABLET ORAL at 15:13

## 2023-01-01 RX ADMIN — HEPARIN, PORCINE (PF) 10 UNIT/ML INTRAVENOUS SYRINGE 5 ML: at 22:44

## 2023-01-01 RX ADMIN — HEPARIN, PORCINE (PF) 10 UNIT/ML INTRAVENOUS SYRINGE 5 ML: at 21:20

## 2023-01-01 RX ADMIN — FUROSEMIDE 20 MG: 20 TABLET ORAL at 08:57

## 2023-01-01 RX ADMIN — MELATONIN TAB 3 MG 3 MG: 3 TAB at 21:04

## 2023-01-01 RX ADMIN — DICLOFENAC SODIUM 2 G: 10 GEL TOPICAL at 08:24

## 2023-01-01 RX ADMIN — ACETAMINOPHEN 325MG 975 MG: 325 TABLET ORAL at 05:11

## 2023-01-01 RX ADMIN — Medication 5 ML: at 14:32

## 2023-01-01 RX ADMIN — ATORVASTATIN CALCIUM 40 MG: 40 TABLET, FILM COATED ORAL at 10:23

## 2023-01-01 RX ADMIN — CARVEDILOL 6.25 MG: 6.25 TABLET, FILM COATED ORAL at 21:05

## 2023-01-01 RX ADMIN — AMLODIPINE BESYLATE 5 MG: 5 TABLET ORAL at 08:33

## 2023-01-01 RX ADMIN — DOXYCYCLINE HYCLATE 100 MG: 100 CAPSULE ORAL at 21:07

## 2023-01-01 RX ADMIN — SODIUM CHLORIDE 500 ML: 9 INJECTION, SOLUTION INTRAVENOUS at 12:01

## 2023-01-01 RX ADMIN — DICLOFENAC SODIUM 2 G: 10 GEL TOPICAL at 21:20

## 2023-01-01 RX ADMIN — ACETAMINOPHEN 1000 MG: 325 TABLET ORAL at 09:52

## 2023-01-01 RX ADMIN — METHOCARBAMOL 500 MG: 500 TABLET ORAL at 19:02

## 2023-01-01 RX ADMIN — CIPROFLOXACIN HYDROCHLORIDE 500 MG: 500 TABLET, FILM COATED ORAL at 19:23

## 2023-01-01 RX ADMIN — ACETAMINOPHEN 650 MG: 325 TABLET, FILM COATED ORAL at 12:41

## 2023-01-01 RX ADMIN — Medication 25 MG: at 10:38

## 2023-01-01 RX ADMIN — FUROSEMIDE 60 MG: 40 TABLET ORAL at 17:28

## 2023-01-01 RX ADMIN — Medication 25 MG: at 09:59

## 2023-01-01 RX ADMIN — FUROSEMIDE 20 MG: 20 TABLET ORAL at 10:21

## 2023-01-01 RX ADMIN — DOXYCYCLINE HYCLATE 100 MG: 100 CAPSULE ORAL at 20:14

## 2023-01-01 RX ADMIN — ACETAMINOPHEN 1000 MG: 500 TABLET ORAL at 08:49

## 2023-01-01 RX ADMIN — MICONAZOLE NITRATE: 20 POWDER TOPICAL at 11:34

## 2023-01-01 RX ADMIN — Medication 1 CAPSULE: at 22:10

## 2023-01-01 RX ADMIN — Medication 25 MG: at 22:19

## 2023-01-01 RX ADMIN — MULTIPLE VITAMINS W/ MINERALS TAB 1 TABLET: TAB at 11:35

## 2023-01-01 RX ADMIN — CIPROFLOXACIN HYDROCHLORIDE 500 MG: 500 TABLET, FILM COATED ORAL at 10:11

## 2023-01-01 RX ADMIN — LIRAGLUTIDE 1.8 MG: 6 INJECTION SUBCUTANEOUS at 11:48

## 2023-01-01 RX ADMIN — METHOCARBAMOL 500 MG: 500 TABLET ORAL at 20:22

## 2023-01-01 RX ADMIN — OXYCODONE HYDROCHLORIDE 10 MG: 5 TABLET ORAL at 16:53

## 2023-01-01 RX ADMIN — ACETAMINOPHEN 325MG 975 MG: 325 TABLET ORAL at 13:56

## 2023-01-01 RX ADMIN — Medication 1 CAPSULE: at 07:58

## 2023-01-01 RX ADMIN — FUROSEMIDE 60 MG: 40 TABLET ORAL at 09:41

## 2023-01-01 RX ADMIN — DOXYCYCLINE HYCLATE 100 MG: 100 CAPSULE ORAL at 21:47

## 2023-01-01 RX ADMIN — APIXABAN 5 MG: 5 TABLET, FILM COATED ORAL at 19:26

## 2023-01-01 RX ADMIN — SENNOSIDES AND DOCUSATE SODIUM 1 TABLET: 50; 8.6 TABLET ORAL at 09:42

## 2023-01-01 RX ADMIN — OXYCODONE HYDROCHLORIDE 5 MG: 5 TABLET ORAL at 10:41

## 2023-01-01 RX ADMIN — HEPARIN, PORCINE (PF) 10 UNIT/ML INTRAVENOUS SYRINGE 5 ML: at 17:15

## 2023-01-01 RX ADMIN — FERROUS SULFATE TAB 325 MG (65 MG ELEMENTAL FE) 325 MG: 325 (65 FE) TAB at 11:10

## 2023-01-01 RX ADMIN — Medication 25 MG: at 04:00

## 2023-01-01 RX ADMIN — Medication 25 MG: at 00:36

## 2023-01-01 RX ADMIN — OXYCODONE HYDROCHLORIDE 10 MG: 5 TABLET ORAL at 13:42

## 2023-01-01 RX ADMIN — MULTIPLE VITAMINS W/ MINERALS TAB 1 TABLET: TAB at 08:13

## 2023-01-01 RX ADMIN — OXYCODONE HYDROCHLORIDE 10 MG: 5 TABLET ORAL at 08:49

## 2023-01-01 RX ADMIN — METHOCARBAMOL 500 MG: 500 TABLET ORAL at 08:21

## 2023-01-01 RX ADMIN — POLYETHYLENE GLYCOL 3350 17 G: 17 POWDER, FOR SOLUTION ORAL at 09:59

## 2023-01-01 RX ADMIN — SENNOSIDES AND DOCUSATE SODIUM 1 TABLET: 8.6; 5 TABLET ORAL at 08:46

## 2023-01-01 RX ADMIN — METHOCARBAMOL 500 MG: 500 TABLET ORAL at 08:46

## 2023-01-01 RX ADMIN — APIXABAN 5 MG: 2.5 TABLET, FILM COATED ORAL at 10:21

## 2023-01-01 RX ADMIN — AMLODIPINE BESYLATE 5 MG: 5 TABLET ORAL at 09:20

## 2023-01-01 RX ADMIN — METHOCARBAMOL 500 MG: 500 TABLET ORAL at 18:18

## 2023-01-01 RX ADMIN — MICONAZOLE NITRATE: 20 POWDER TOPICAL at 08:33

## 2023-01-01 RX ADMIN — DOXYCYCLINE HYCLATE 100 MG: 100 CAPSULE ORAL at 11:08

## 2023-01-01 RX ADMIN — MULTIPLE VITAMINS W/ MINERALS TAB 1 TABLET: TAB at 12:43

## 2023-01-01 RX ADMIN — ACETAMINOPHEN 325MG 975 MG: 325 TABLET ORAL at 21:12

## 2023-01-01 RX ADMIN — ACETAMINOPHEN 325MG 975 MG: 325 TABLET ORAL at 14:01

## 2023-01-01 RX ADMIN — ACETAMINOPHEN 325MG 975 MG: 325 TABLET ORAL at 05:25

## 2023-01-01 RX ADMIN — MICONAZOLE NITRATE: 20 POWDER TOPICAL at 09:25

## 2023-01-01 RX ADMIN — MICONAZOLE NITRATE: 20 POWDER TOPICAL at 07:37

## 2023-01-01 RX ADMIN — APIXABAN 5 MG: 5 TABLET, FILM COATED ORAL at 20:17

## 2023-01-01 RX ADMIN — METHOCARBAMOL 500 MG: 500 TABLET ORAL at 09:23

## 2023-01-01 RX ADMIN — CARVEDILOL 6.25 MG: 6.25 TABLET, FILM COATED ORAL at 18:42

## 2023-01-01 RX ADMIN — METFORMIN ER 500 MG 2000 MG: 500 TABLET ORAL at 09:51

## 2023-01-01 RX ADMIN — POLYETHYLENE GLYCOL 3350 17 G: 17 POWDER, FOR SOLUTION ORAL at 08:39

## 2023-01-01 RX ADMIN — METHOCARBAMOL 500 MG: 500 TABLET ORAL at 23:23

## 2023-01-01 RX ADMIN — FUROSEMIDE 20 MG: 20 TABLET ORAL at 09:26

## 2023-01-01 RX ADMIN — Medication 1 CAPSULE: at 08:04

## 2023-01-01 RX ADMIN — CARVEDILOL 6.25 MG: 6.25 TABLET, FILM COATED ORAL at 18:33

## 2023-01-01 RX ADMIN — DOXYCYCLINE 100 MG: 100 CAPSULE ORAL at 08:19

## 2023-01-01 RX ADMIN — FUROSEMIDE 20 MG: 20 TABLET ORAL at 09:18

## 2023-01-01 RX ADMIN — CIPROFLOXACIN HYDROCHLORIDE 500 MG: 500 TABLET, FILM COATED ORAL at 09:05

## 2023-01-01 RX ADMIN — MELATONIN TAB 3 MG 3 MG: 3 TAB at 21:11

## 2023-01-01 RX ADMIN — APIXABAN 5 MG: 5 TABLET, FILM COATED ORAL at 08:17

## 2023-01-01 RX ADMIN — Medication 1 CAPSULE: at 08:20

## 2023-01-01 RX ADMIN — METHOCARBAMOL 500 MG: 500 TABLET ORAL at 20:34

## 2023-01-01 RX ADMIN — DOXYCYCLINE HYCLATE 100 MG: 100 CAPSULE ORAL at 19:09

## 2023-01-01 RX ADMIN — AMLODIPINE BESYLATE 10 MG: 10 TABLET ORAL at 09:26

## 2023-01-01 RX ADMIN — ACETAMINOPHEN 325MG 975 MG: 325 TABLET ORAL at 03:37

## 2023-01-01 RX ADMIN — LIRAGLUTIDE 1.8 MG: 6 INJECTION SUBCUTANEOUS at 10:30

## 2023-01-01 RX ADMIN — MULTIPLE VITAMINS W/ MINERALS TAB 1 TABLET: TAB at 12:48

## 2023-01-01 RX ADMIN — Medication 5 ML: at 16:49

## 2023-01-01 RX ADMIN — METHOCARBAMOL 500 MG: 500 TABLET ORAL at 08:27

## 2023-01-01 RX ADMIN — DICLOFENAC SODIUM 2 G: 10 GEL TOPICAL at 17:31

## 2023-01-01 RX ADMIN — LIDOCAINE PATCH 4% 1 PATCH: 40 PATCH TOPICAL at 08:31

## 2023-01-01 RX ADMIN — METHOCARBAMOL 500 MG: 500 TABLET ORAL at 12:09

## 2023-01-01 RX ADMIN — MICONAZOLE NITRATE: 20 POWDER TOPICAL at 10:59

## 2023-01-01 RX ADMIN — Medication 1 CAPSULE: at 12:10

## 2023-01-01 RX ADMIN — APIXABAN 2.5 MG: 2.5 TABLET, FILM COATED ORAL at 20:03

## 2023-01-01 RX ADMIN — CARVEDILOL 6.25 MG: 3.12 TABLET, FILM COATED ORAL at 19:23

## 2023-01-01 RX ADMIN — METHOCARBAMOL 500 MG: 500 TABLET ORAL at 20:54

## 2023-01-01 RX ADMIN — FERROUS SULFATE TAB 325 MG (65 MG ELEMENTAL FE) 325 MG: 325 (65 FE) TAB at 12:48

## 2023-01-01 RX ADMIN — METFORMIN ER 500 MG 2000 MG: 500 TABLET ORAL at 10:12

## 2023-01-01 RX ADMIN — Medication 3 MG: at 22:32

## 2023-01-01 RX ADMIN — SODIUM CHLORIDE: 9 INJECTION, SOLUTION INTRAVENOUS at 14:10

## 2023-01-01 RX ADMIN — APIXABAN 2.5 MG: 2.5 TABLET, FILM COATED ORAL at 19:45

## 2023-01-01 RX ADMIN — METHOCARBAMOL 500 MG: 500 TABLET ORAL at 09:52

## 2023-01-01 RX ADMIN — APIXABAN 2.5 MG: 2.5 TABLET, FILM COATED ORAL at 19:58

## 2023-01-01 RX ADMIN — Medication 1 CAPSULE: at 08:36

## 2023-01-01 RX ADMIN — CARVEDILOL 6.25 MG: 6.25 TABLET, FILM COATED ORAL at 16:56

## 2023-01-01 RX ADMIN — ACETAMINOPHEN 1000 MG: 325 TABLET ORAL at 08:51

## 2023-01-01 RX ADMIN — MICONAZOLE NITRATE: 20 POWDER TOPICAL at 20:12

## 2023-01-01 RX ADMIN — FOLIC ACID 1 MG: 1 TABLET ORAL at 11:26

## 2023-01-01 RX ADMIN — VANCOMYCIN HYDROCHLORIDE 2000 MG: 1 INJECTION, POWDER, LYOPHILIZED, FOR SOLUTION INTRAVENOUS at 10:41

## 2023-01-01 RX ADMIN — TRAMADOL HYDROCHLORIDE 50 MG: 50 TABLET, COATED ORAL at 15:38

## 2023-01-01 RX ADMIN — LIDOCAINE PATCH 4% 1 PATCH: 40 PATCH TOPICAL at 17:45

## 2023-01-01 RX ADMIN — METHOCARBAMOL 500 MG: 500 TABLET ORAL at 16:06

## 2023-01-01 RX ADMIN — APIXABAN 5 MG: 2.5 TABLET, FILM COATED ORAL at 20:48

## 2023-01-01 RX ADMIN — METHOCARBAMOL 500 MG: 500 TABLET ORAL at 13:24

## 2023-01-01 RX ADMIN — PIOGLITAZONE HYDROCHLORIDE 45 MG: 45 TABLET ORAL at 08:33

## 2023-01-01 RX ADMIN — FOLIC ACID 1 MG: 1 TABLET ORAL at 13:50

## 2023-01-01 RX ADMIN — LIDOCAINE PATCH 4% 1 PATCH: 40 PATCH TOPICAL at 09:24

## 2023-01-01 RX ADMIN — CIPROFLOXACIN HYDROCHLORIDE 500 MG: 500 TABLET, FILM COATED ORAL at 10:33

## 2023-01-01 RX ADMIN — MELATONIN TAB 3 MG 3 MG: 3 TAB at 21:59

## 2023-01-01 RX ADMIN — PIOGLITAZONE HYDROCHLORIDE 45 MG: 45 TABLET ORAL at 11:37

## 2023-01-01 RX ADMIN — DOXYCYCLINE 100 MG: 100 CAPSULE ORAL at 18:38

## 2023-01-01 RX ADMIN — ACETAMINOPHEN 975 MG: 325 TABLET, FILM COATED ORAL at 13:48

## 2023-01-01 RX ADMIN — METFORMIN HYDROCHLORIDE 2000 MG: 500 TABLET, EXTENDED RELEASE ORAL at 09:24

## 2023-01-01 RX ADMIN — ACETAMINOPHEN 975 MG: 325 TABLET, FILM COATED ORAL at 13:30

## 2023-01-01 RX ADMIN — CARVEDILOL 3.12 MG: 3.12 TABLET, FILM COATED ORAL at 11:11

## 2023-01-01 RX ADMIN — METFORMIN HYDROCHLORIDE 2000 MG: 500 TABLET, EXTENDED RELEASE ORAL at 09:48

## 2023-01-01 RX ADMIN — MICONAZOLE NITRATE: 20 POWDER TOPICAL at 20:10

## 2023-01-01 RX ADMIN — CEFEPIME HYDROCHLORIDE 2 G: 2 INJECTION, POWDER, FOR SOLUTION INTRAVENOUS at 13:45

## 2023-01-01 RX ADMIN — PIOGLITAZONE HYDROCHLORIDE 45 MG: 45 TABLET ORAL at 11:23

## 2023-01-01 RX ADMIN — FUROSEMIDE 20 MG: 20 TABLET ORAL at 08:53

## 2023-01-01 RX ADMIN — ALTEPLASE 2 MG: 2.2 INJECTION, POWDER, LYOPHILIZED, FOR SOLUTION INTRAVENOUS at 21:51

## 2023-01-01 RX ADMIN — LIRAGLUTIDE 1.8 MG: 6 INJECTION SUBCUTANEOUS at 09:13

## 2023-01-01 RX ADMIN — APIXABAN 5 MG: 5 TABLET, FILM COATED ORAL at 09:24

## 2023-01-01 RX ADMIN — ACETAMINOPHEN 1000 MG: 325 TABLET ORAL at 08:46

## 2023-01-01 RX ADMIN — DOXYCYCLINE HYCLATE 100 MG: 100 CAPSULE ORAL at 08:36

## 2023-01-01 RX ADMIN — APIXABAN 5 MG: 2.5 TABLET, FILM COATED ORAL at 20:53

## 2023-01-01 RX ADMIN — Medication 2 G: at 09:21

## 2023-01-01 RX ADMIN — Medication 1 CAPSULE: at 10:14

## 2023-01-01 RX ADMIN — CARVEDILOL 6.25 MG: 6.25 TABLET, FILM COATED ORAL at 18:58

## 2023-01-01 RX ADMIN — APIXABAN 5 MG: 2.5 TABLET, FILM COATED ORAL at 08:20

## 2023-01-01 RX ADMIN — Medication 2 G: at 17:57

## 2023-01-01 RX ADMIN — METHOCARBAMOL 500 MG: 500 TABLET ORAL at 21:19

## 2023-01-01 RX ADMIN — METHOCARBAMOL 500 MG: 500 TABLET ORAL at 09:24

## 2023-01-01 RX ADMIN — METHOCARBAMOL 500 MG: 500 TABLET ORAL at 10:50

## 2023-01-01 RX ADMIN — SENNOSIDES AND DOCUSATE SODIUM 1 TABLET: 50; 8.6 TABLET ORAL at 08:57

## 2023-01-01 RX ADMIN — LIDOCAINE PATCH 4% 1 PATCH: 40 PATCH TOPICAL at 16:43

## 2023-01-01 RX ADMIN — LIRAGLUTIDE 1.8 MG: 6 INJECTION SUBCUTANEOUS at 08:53

## 2023-01-01 RX ADMIN — SENNOSIDES AND DOCUSATE SODIUM 1 TABLET: 50; 8.6 TABLET ORAL at 22:34

## 2023-01-01 RX ADMIN — ACETAMINOPHEN 650 MG: 325 TABLET, FILM COATED ORAL at 08:34

## 2023-01-01 RX ADMIN — DOXYCYCLINE HYCLATE 100 MG: 100 CAPSULE ORAL at 09:18

## 2023-01-01 RX ADMIN — APIXABAN 2.5 MG: 2.5 TABLET, FILM COATED ORAL at 08:44

## 2023-01-01 RX ADMIN — PIOGLITAZONE HYDROCHLORIDE 45 MG: 45 TABLET ORAL at 08:56

## 2023-01-01 RX ADMIN — ACETAMINOPHEN 325MG 975 MG: 325 TABLET ORAL at 10:40

## 2023-01-01 RX ADMIN — ACETAMINOPHEN 1000 MG: 325 TABLET ORAL at 08:20

## 2023-01-01 RX ADMIN — Medication 50 MCG: at 09:04

## 2023-01-01 RX ADMIN — SENNOSIDES AND DOCUSATE SODIUM 1 TABLET: 50; 8.6 TABLET ORAL at 21:06

## 2023-01-01 RX ADMIN — THIAMINE HCL TAB 100 MG 100 MG: 100 TAB at 08:42

## 2023-01-01 RX ADMIN — TRAMADOL HYDROCHLORIDE 50 MG: 50 TABLET, COATED ORAL at 20:31

## 2023-01-01 RX ADMIN — ACETAMINOPHEN 975 MG: 325 TABLET, FILM COATED ORAL at 11:33

## 2023-01-01 RX ADMIN — Medication 50 MG: at 11:09

## 2023-01-01 RX ADMIN — FENTANYL CITRATE 50 MCG: 50 INJECTION, SOLUTION INTRAMUSCULAR; INTRAVENOUS at 15:00

## 2023-01-01 RX ADMIN — AMLODIPINE BESYLATE 10 MG: 10 TABLET ORAL at 11:09

## 2023-01-01 RX ADMIN — LIRAGLUTIDE 1.8 MG: 6 INJECTION SUBCUTANEOUS at 13:24

## 2023-01-01 RX ADMIN — Medication 2 G: at 11:23

## 2023-01-01 RX ADMIN — Medication 1 CAPSULE: at 21:43

## 2023-01-01 RX ADMIN — ACETAMINOPHEN 975 MG: 325 TABLET, FILM COATED ORAL at 21:50

## 2023-01-01 RX ADMIN — MULTIPLE VITAMINS W/ MINERALS TAB 1 TABLET: TAB at 09:07

## 2023-01-01 RX ADMIN — Medication 1 CAPSULE: at 19:26

## 2023-01-01 RX ADMIN — Medication 50 MCG: at 09:49

## 2023-01-01 RX ADMIN — DOXYCYCLINE HYCLATE 100 MG: 100 CAPSULE ORAL at 11:07

## 2023-01-01 RX ADMIN — CARVEDILOL 6.25 MG: 6.25 TABLET, FILM COATED ORAL at 17:20

## 2023-01-01 RX ADMIN — METHOCARBAMOL 500 MG: 500 TABLET ORAL at 14:12

## 2023-01-01 RX ADMIN — AMLODIPINE BESYLATE 10 MG: 10 TABLET ORAL at 11:29

## 2023-01-01 RX ADMIN — CARVEDILOL 6.25 MG: 6.25 TABLET, FILM COATED ORAL at 18:49

## 2023-01-01 RX ADMIN — ACETAMINOPHEN 975 MG: 325 TABLET, FILM COATED ORAL at 06:26

## 2023-01-01 RX ADMIN — CEFAZOLIN SODIUM 2 G: 2 INJECTION, SOLUTION INTRAVENOUS at 05:45

## 2023-01-01 RX ADMIN — Medication 3 MG: at 22:11

## 2023-01-01 RX ADMIN — DICLOFENAC SODIUM 2 G: 10 GEL TOPICAL at 18:05

## 2023-01-01 RX ADMIN — THIAMINE HCL TAB 100 MG 100 MG: 100 TAB at 10:06

## 2023-01-01 RX ADMIN — DOCUSATE SODIUM 100 MG: 100 CAPSULE, LIQUID FILLED ORAL at 10:21

## 2023-01-01 RX ADMIN — PIOGLITAZONE HYDROCHLORIDE 45 MG: 45 TABLET ORAL at 09:16

## 2023-01-01 RX ADMIN — APIXABAN 5 MG: 5 TABLET, FILM COATED ORAL at 19:47

## 2023-01-01 RX ADMIN — APIXABAN 5 MG: 2.5 TABLET, FILM COATED ORAL at 09:51

## 2023-01-01 RX ADMIN — Medication 5 ML: at 15:40

## 2023-01-01 RX ADMIN — MICONAZOLE NITRATE: 20 POWDER TOPICAL at 10:32

## 2023-01-01 RX ADMIN — Medication 1 CAPSULE: at 21:34

## 2023-01-01 RX ADMIN — ACETAMINOPHEN 650 MG: 325 TABLET, FILM COATED ORAL at 22:34

## 2023-01-01 RX ADMIN — OXYCODONE HYDROCHLORIDE 10 MG: 5 TABLET ORAL at 15:08

## 2023-01-01 RX ADMIN — METFORMIN HYDROCHLORIDE 2000 MG: 500 TABLET, EXTENDED RELEASE ORAL at 09:30

## 2023-01-01 RX ADMIN — ACETAMINOPHEN 325MG 975 MG: 325 TABLET ORAL at 21:04

## 2023-01-01 RX ADMIN — CARVEDILOL 6.25 MG: 3.12 TABLET, FILM COATED ORAL at 08:23

## 2023-01-01 RX ADMIN — HEPARIN, PORCINE (PF) 10 UNIT/ML INTRAVENOUS SYRINGE 5 ML: at 08:34

## 2023-01-01 RX ADMIN — MICONAZOLE NITRATE: 20 POWDER TOPICAL at 10:10

## 2023-01-01 RX ADMIN — DOXYCYCLINE HYCLATE 100 MG: 100 CAPSULE ORAL at 09:01

## 2023-01-01 RX ADMIN — MICONAZOLE NITRATE: 20 POWDER TOPICAL at 10:12

## 2023-01-01 RX ADMIN — ACETAMINOPHEN 650 MG: 325 TABLET, FILM COATED ORAL at 20:14

## 2023-01-01 RX ADMIN — METHOCARBAMOL 500 MG: 500 TABLET ORAL at 09:38

## 2023-01-01 RX ADMIN — CIPROFLOXACIN HYDROCHLORIDE 500 MG: 500 TABLET, FILM COATED ORAL at 09:47

## 2023-01-01 RX ADMIN — CIPROFLOXACIN HYDROCHLORIDE 500 MG: 500 TABLET, FILM COATED ORAL at 18:58

## 2023-01-01 RX ADMIN — ACETAMINOPHEN 975 MG: 325 TABLET, FILM COATED ORAL at 04:53

## 2023-01-01 RX ADMIN — ACETAMINOPHEN 975 MG: 325 TABLET, FILM COATED ORAL at 21:17

## 2023-01-01 RX ADMIN — ACETAMINOPHEN 325MG 975 MG: 325 TABLET ORAL at 13:19

## 2023-01-01 RX ADMIN — Medication 5 ML: at 13:56

## 2023-01-01 RX ADMIN — AMLODIPINE BESYLATE 10 MG: 10 TABLET ORAL at 11:07

## 2023-01-01 RX ADMIN — PIOGLITAZONE HYDROCHLORIDE 45 MG: 45 TABLET ORAL at 11:01

## 2023-01-01 RX ADMIN — DOCUSATE SODIUM 100 MG: 100 CAPSULE, LIQUID FILLED ORAL at 09:06

## 2023-01-01 RX ADMIN — Medication 25 MG: at 16:33

## 2023-01-01 RX ADMIN — SENNOSIDES AND DOCUSATE SODIUM 1 TABLET: 50; 8.6 TABLET ORAL at 21:16

## 2023-01-01 RX ADMIN — POLYETHYLENE GLYCOL 3350 17 G: 17 POWDER, FOR SOLUTION ORAL at 08:19

## 2023-01-01 RX ADMIN — ACETAMINOPHEN 325MG 975 MG: 325 TABLET ORAL at 14:46

## 2023-01-01 RX ADMIN — MICONAZOLE NITRATE: 20 POWDER TOPICAL at 21:55

## 2023-01-01 RX ADMIN — SENNOSIDES AND DOCUSATE SODIUM 1 TABLET: 8.6; 5 TABLET ORAL at 08:50

## 2023-01-01 RX ADMIN — POLYETHYLENE GLYCOL 3350 17 G: 17 POWDER, FOR SOLUTION ORAL at 08:51

## 2023-01-01 RX ADMIN — Medication 1 CAPSULE: at 22:00

## 2023-01-01 RX ADMIN — METHOCARBAMOL 500 MG: 500 TABLET ORAL at 09:03

## 2023-01-01 RX ADMIN — APIXABAN 5 MG: 2.5 TABLET, FILM COATED ORAL at 20:25

## 2023-01-01 RX ADMIN — APIXABAN 2.5 MG: 2.5 TABLET, FILM COATED ORAL at 19:23

## 2023-01-01 RX ADMIN — HEPARIN SODIUM 5000 UNITS: 5000 INJECTION, SOLUTION INTRAVENOUS; SUBCUTANEOUS at 08:56

## 2023-01-01 RX ADMIN — SENNOSIDES AND DOCUSATE SODIUM 1 TABLET: 8.6; 5 TABLET ORAL at 09:18

## 2023-01-01 RX ADMIN — Medication 25 MG: at 15:50

## 2023-01-01 RX ADMIN — DICLOFENAC SODIUM 2 G: 10 GEL TOPICAL at 19:33

## 2023-01-01 RX ADMIN — METHOCARBAMOL 500 MG: 500 TABLET ORAL at 21:22

## 2023-01-01 RX ADMIN — CARVEDILOL 6.25 MG: 6.25 TABLET, FILM COATED ORAL at 10:43

## 2023-01-01 RX ADMIN — LIRAGLUTIDE 1.8 MG: 6 INJECTION SUBCUTANEOUS at 09:49

## 2023-01-01 RX ADMIN — MICONAZOLE NITRATE: 20 POWDER TOPICAL at 20:39

## 2023-01-01 RX ADMIN — CARVEDILOL 6.25 MG: 6.25 TABLET, FILM COATED ORAL at 19:32

## 2023-01-01 RX ADMIN — ACETAMINOPHEN 975 MG: 325 TABLET, FILM COATED ORAL at 06:01

## 2023-01-01 RX ADMIN — MULTIPLE VITAMINS W/ MINERALS TAB 1 TABLET: TAB at 12:32

## 2023-01-01 RX ADMIN — APIXABAN 5 MG: 2.5 TABLET, FILM COATED ORAL at 10:08

## 2023-01-01 RX ADMIN — ACETAMINOPHEN 1000 MG: 325 TABLET ORAL at 10:09

## 2023-01-01 RX ADMIN — HEPARIN SODIUM 5000 UNITS: 5000 INJECTION, SOLUTION INTRAVENOUS; SUBCUTANEOUS at 21:07

## 2023-01-01 RX ADMIN — HEPARIN, PORCINE (PF) 10 UNIT/ML INTRAVENOUS SYRINGE 5 ML: at 23:11

## 2023-01-01 RX ADMIN — METFORMIN ER 500 MG 2000 MG: 500 TABLET ORAL at 12:29

## 2023-01-01 RX ADMIN — Medication 2 G: at 16:50

## 2023-01-01 RX ADMIN — CARVEDILOL 6.25 MG: 6.25 TABLET, FILM COATED ORAL at 18:45

## 2023-01-01 RX ADMIN — Medication 1 CAPSULE: at 19:22

## 2023-01-01 RX ADMIN — Medication 50 MG: at 09:47

## 2023-01-01 RX ADMIN — METHOCARBAMOL 500 MG: 500 TABLET ORAL at 22:08

## 2023-01-01 RX ADMIN — AMLODIPINE BESYLATE 10 MG: 10 TABLET ORAL at 08:43

## 2023-01-01 RX ADMIN — OXYCODONE HYDROCHLORIDE 10 MG: 10 TABLET ORAL at 22:21

## 2023-01-01 RX ADMIN — Medication 1 CAPSULE: at 20:57

## 2023-01-01 RX ADMIN — ACETAMINOPHEN 650 MG: 325 TABLET, FILM COATED ORAL at 12:16

## 2023-01-01 RX ADMIN — Medication 25 MG: at 20:46

## 2023-01-01 RX ADMIN — ACETAMINOPHEN 975 MG: 325 TABLET, FILM COATED ORAL at 21:24

## 2023-01-01 RX ADMIN — VANCOMYCIN HYDROCHLORIDE 2000 MG: 10 INJECTION, POWDER, LYOPHILIZED, FOR SOLUTION INTRAVENOUS at 11:11

## 2023-01-01 RX ADMIN — Medication 10 MG: at 13:50

## 2023-01-01 RX ADMIN — MULTIPLE VITAMINS W/ MINERALS TAB 1 TABLET: TAB at 11:19

## 2023-01-01 RX ADMIN — AMLODIPINE BESYLATE 10 MG: 10 TABLET ORAL at 08:06

## 2023-01-01 RX ADMIN — METFORMIN HYDROCHLORIDE 2000 MG: 500 TABLET, EXTENDED RELEASE ORAL at 10:19

## 2023-01-01 RX ADMIN — LIRAGLUTIDE 1.8 MG: 6 INJECTION SUBCUTANEOUS at 08:35

## 2023-01-01 RX ADMIN — APIXABAN 5 MG: 2.5 TABLET, FILM COATED ORAL at 10:20

## 2023-01-01 RX ADMIN — MULTIPLE VITAMINS W/ MINERALS TAB 1 TABLET: TAB at 12:34

## 2023-01-01 RX ADMIN — PIOGLITAZONE HYDROCHLORIDE 45 MG: 45 TABLET ORAL at 09:48

## 2023-01-01 RX ADMIN — SENNOSIDES AND DOCUSATE SODIUM 1 TABLET: 50; 8.6 TABLET ORAL at 19:26

## 2023-01-01 RX ADMIN — AMLODIPINE BESYLATE 10 MG: 10 TABLET ORAL at 08:45

## 2023-01-01 RX ADMIN — MIDAZOLAM 1 MG: 1 INJECTION INTRAMUSCULAR; INTRAVENOUS at 13:15

## 2023-01-01 RX ADMIN — FOLIC ACID 1 MG: 1 TABLET ORAL at 11:09

## 2023-01-01 RX ADMIN — PIOGLITAZONE HYDROCHLORIDE 45 MG: 45 TABLET ORAL at 11:06

## 2023-01-01 RX ADMIN — SUGAMMADEX 200 MG: 100 INJECTION, SOLUTION INTRAVENOUS at 17:18

## 2023-01-01 RX ADMIN — ACETAMINOPHEN 1000 MG: 325 TABLET ORAL at 22:03

## 2023-01-01 RX ADMIN — DICLOFENAC SODIUM 2 G: 10 GEL TOPICAL at 10:40

## 2023-01-01 RX ADMIN — APIXABAN 5 MG: 2.5 TABLET, FILM COATED ORAL at 08:56

## 2023-01-01 RX ADMIN — FOLIC ACID 1 MG: 1 TABLET ORAL at 08:37

## 2023-01-01 RX ADMIN — METHOCARBAMOL 500 MG: 500 TABLET ORAL at 10:42

## 2023-01-01 RX ADMIN — MICONAZOLE NITRATE: 20 POWDER TOPICAL at 20:52

## 2023-01-01 RX ADMIN — MICONAZOLE NITRATE: 20 POWDER TOPICAL at 10:25

## 2023-01-01 RX ADMIN — METHOCARBAMOL 500 MG: 500 TABLET ORAL at 00:36

## 2023-01-01 RX ADMIN — APIXABAN 5 MG: 2.5 TABLET, FILM COATED ORAL at 20:56

## 2023-01-01 RX ADMIN — THIAMINE HCL TAB 100 MG 100 MG: 100 TAB at 08:43

## 2023-01-01 RX ADMIN — MICONAZOLE NITRATE: 20 POWDER TOPICAL at 10:33

## 2023-01-01 RX ADMIN — CIPROFLOXACIN HYDROCHLORIDE 500 MG: 500 TABLET, FILM COATED ORAL at 11:08

## 2023-01-01 RX ADMIN — THIAMINE HCL TAB 100 MG 100 MG: 100 TAB at 13:09

## 2023-01-01 RX ADMIN — DOXYCYCLINE HYCLATE 100 MG: 100 CAPSULE ORAL at 09:25

## 2023-01-01 RX ADMIN — DOXYCYCLINE 100 MG: 100 CAPSULE ORAL at 19:56

## 2023-01-01 RX ADMIN — PIOGLITAZONE HYDROCHLORIDE 45 MG: 45 TABLET ORAL at 09:07

## 2023-01-01 RX ADMIN — ACETAMINOPHEN 975 MG: 325 TABLET, FILM COATED ORAL at 15:11

## 2023-01-01 RX ADMIN — METFORMIN ER 500 MG 2000 MG: 500 TABLET ORAL at 10:02

## 2023-01-01 RX ADMIN — Medication 2.5 MG: at 05:32

## 2023-01-01 RX ADMIN — Medication 25 MG: at 09:49

## 2023-01-01 RX ADMIN — METHOCARBAMOL 500 MG: 500 TABLET ORAL at 22:55

## 2023-01-01 RX ADMIN — Medication 2 G: at 18:28

## 2023-01-01 RX ADMIN — METHOCARBAMOL 500 MG: 500 TABLET ORAL at 22:28

## 2023-01-01 RX ADMIN — MULTIPLE VITAMINS W/ MINERALS TAB 1 TABLET: TAB at 13:30

## 2023-01-01 RX ADMIN — Medication 1 CAPSULE: at 10:23

## 2023-01-01 RX ADMIN — Medication 50 MCG: at 14:38

## 2023-01-01 RX ADMIN — ACETAMINOPHEN 1000 MG: 325 TABLET ORAL at 18:01

## 2023-01-01 RX ADMIN — DICLOFENAC SODIUM 2 G: 10 GEL TOPICAL at 20:44

## 2023-01-01 RX ADMIN — Medication 1 CAPSULE: at 11:11

## 2023-01-01 RX ADMIN — HEPARIN, PORCINE (PF) 10 UNIT/ML INTRAVENOUS SYRINGE 5 ML: at 03:19

## 2023-01-01 RX ADMIN — AMLODIPINE BESYLATE 5 MG: 5 TABLET ORAL at 09:23

## 2023-01-01 RX ADMIN — ATORVASTATIN CALCIUM 40 MG: 40 TABLET, FILM COATED ORAL at 10:10

## 2023-01-01 RX ADMIN — FOLIC ACID 1 MG: 1 TABLET ORAL at 13:01

## 2023-01-01 RX ADMIN — DICLOFENAC SODIUM 2 G: 10 GEL TOPICAL at 08:44

## 2023-01-01 RX ADMIN — OXYCODONE HYDROCHLORIDE 5 MG: 5 TABLET ORAL at 09:13

## 2023-01-01 RX ADMIN — CEFAZOLIN SODIUM 2 G: 2 INJECTION, SOLUTION INTRAVENOUS at 12:33

## 2023-01-01 RX ADMIN — DOCUSATE SODIUM 100 MG: 100 CAPSULE, LIQUID FILLED ORAL at 08:49

## 2023-01-01 RX ADMIN — FERROUS SULFATE TAB 325 MG (65 MG ELEMENTAL FE) 325 MG: 325 (65 FE) TAB at 13:59

## 2023-01-01 RX ADMIN — ACETAMINOPHEN 975 MG: 325 TABLET, FILM COATED ORAL at 14:37

## 2023-01-01 RX ADMIN — DICLOFENAC SODIUM 2 G: 10 GEL TOPICAL at 11:02

## 2023-01-01 RX ADMIN — ACETAMINOPHEN 975 MG: 325 TABLET, FILM COATED ORAL at 20:22

## 2023-01-01 RX ADMIN — ACETAMINOPHEN 325MG 975 MG: 325 TABLET ORAL at 18:11

## 2023-01-01 RX ADMIN — INSULIN ASPART 1 UNITS: 100 INJECTION, SOLUTION INTRAVENOUS; SUBCUTANEOUS at 18:43

## 2023-01-01 RX ADMIN — DOXYCYCLINE HYCLATE 100 MG: 100 CAPSULE ORAL at 20:56

## 2023-01-01 RX ADMIN — APIXABAN 2.5 MG: 2.5 TABLET, FILM COATED ORAL at 21:41

## 2023-01-01 RX ADMIN — Medication 25 MG: at 20:10

## 2023-01-01 RX ADMIN — ACETAMINOPHEN 975 MG: 325 TABLET, FILM COATED ORAL at 13:54

## 2023-01-01 RX ADMIN — PIOGLITAZONE HYDROCHLORIDE 45 MG: 45 TABLET ORAL at 09:33

## 2023-01-01 RX ADMIN — ACETAMINOPHEN 1000 MG: 500 TABLET ORAL at 08:18

## 2023-01-01 RX ADMIN — OXYCODONE HYDROCHLORIDE 5 MG: 5 TABLET ORAL at 19:38

## 2023-01-01 RX ADMIN — AMLODIPINE BESYLATE 10 MG: 10 TABLET ORAL at 13:49

## 2023-01-01 RX ADMIN — ACETAMINOPHEN 650 MG: 325 TABLET, FILM COATED ORAL at 13:01

## 2023-01-01 RX ADMIN — LIRAGLUTIDE 1.8 MG: 6 INJECTION SUBCUTANEOUS at 10:41

## 2023-01-01 RX ADMIN — DOXYCYCLINE 100 MG: 100 CAPSULE ORAL at 19:34

## 2023-01-01 RX ADMIN — CARVEDILOL 6.25 MG: 6.25 TABLET, FILM COATED ORAL at 09:22

## 2023-01-01 RX ADMIN — APIXABAN 5 MG: 5 TABLET, FILM COATED ORAL at 20:31

## 2023-01-01 RX ADMIN — Medication 5 MG: at 22:43

## 2023-01-01 RX ADMIN — Medication 1 CAPSULE: at 21:08

## 2023-01-01 RX ADMIN — METFORMIN ER 500 MG 2000 MG: 500 TABLET ORAL at 10:38

## 2023-01-01 RX ADMIN — ACETAMINOPHEN 650 MG: 325 TABLET, FILM COATED ORAL at 08:29

## 2023-01-01 RX ADMIN — DICLOFENAC SODIUM 2 G: 10 GEL TOPICAL at 11:34

## 2023-01-01 RX ADMIN — MELATONIN TAB 3 MG 3 MG: 3 TAB at 20:39

## 2023-01-01 RX ADMIN — METFORMIN HYDROCHLORIDE 2000 MG: 500 TABLET, EXTENDED RELEASE ORAL at 09:07

## 2023-01-01 RX ADMIN — Medication 1 CAPSULE: at 21:19

## 2023-01-01 RX ADMIN — ACETAMINOPHEN 325MG 975 MG: 325 TABLET ORAL at 10:23

## 2023-01-01 RX ADMIN — LIDOCAINE PATCH 4% 1 PATCH: 40 PATCH TOPICAL at 21:12

## 2023-01-01 RX ADMIN — LIDOCAINE PATCH 4% 1 PATCH: 40 PATCH TOPICAL at 10:22

## 2023-01-01 RX ADMIN — LIDOCAINE PATCH 4% 1 PATCH: 40 PATCH TOPICAL at 11:31

## 2023-01-01 RX ADMIN — MIDAZOLAM 0.5 MG: 1 INJECTION INTRAMUSCULAR; INTRAVENOUS at 10:13

## 2023-01-01 RX ADMIN — ACETAMINOPHEN 650 MG: 325 TABLET, FILM COATED ORAL at 02:10

## 2023-01-01 RX ADMIN — ACETAMINOPHEN 325MG 975 MG: 325 TABLET ORAL at 21:21

## 2023-01-01 RX ADMIN — PIOGLITAZONE HYDROCHLORIDE 45 MG: 45 TABLET ORAL at 10:05

## 2023-01-01 RX ADMIN — MICONAZOLE NITRATE: 20 POWDER TOPICAL at 08:51

## 2023-01-01 RX ADMIN — INSULIN ASPART 1 UNITS: 100 INJECTION, SOLUTION INTRAVENOUS; SUBCUTANEOUS at 17:50

## 2023-01-01 RX ADMIN — PIOGLITAZONE HYDROCHLORIDE 45 MG: 45 TABLET ORAL at 10:22

## 2023-01-01 RX ADMIN — AMLODIPINE BESYLATE 10 MG: 10 TABLET ORAL at 13:50

## 2023-01-01 RX ADMIN — APIXABAN 5 MG: 2.5 TABLET, FILM COATED ORAL at 10:18

## 2023-01-01 RX ADMIN — METHOCARBAMOL 500 MG: 500 TABLET ORAL at 08:31

## 2023-01-01 RX ADMIN — CIPROFLOXACIN HYDROCHLORIDE 500 MG: 500 TABLET, FILM COATED ORAL at 21:44

## 2023-01-01 RX ADMIN — CARVEDILOL 6.25 MG: 3.12 TABLET, FILM COATED ORAL at 10:35

## 2023-01-01 RX ADMIN — THIAMINE HCL TAB 100 MG 100 MG: 100 TAB at 09:25

## 2023-01-01 RX ADMIN — FUROSEMIDE 60 MG: 40 TABLET ORAL at 08:14

## 2023-01-01 RX ADMIN — MULTIPLE VITAMINS W/ MINERALS TAB 1 TABLET: TAB at 13:54

## 2023-01-01 RX ADMIN — CIPROFLOXACIN HYDROCHLORIDE 500 MG: 500 TABLET, FILM COATED ORAL at 05:42

## 2023-01-01 RX ADMIN — FOLIC ACID 1 MG: 1 TABLET ORAL at 11:08

## 2023-01-01 RX ADMIN — METHOCARBAMOL 500 MG: 500 TABLET ORAL at 13:46

## 2023-01-01 RX ADMIN — Medication 1 CAPSULE: at 09:44

## 2023-01-01 RX ADMIN — METFORMIN ER 500 MG 2000 MG: 500 TABLET ORAL at 09:16

## 2023-01-01 RX ADMIN — ACETAMINOPHEN 325MG 975 MG: 325 TABLET ORAL at 14:33

## 2023-01-01 RX ADMIN — VANCOMYCIN HYDROCHLORIDE 1500 MG: 10 INJECTION, POWDER, LYOPHILIZED, FOR SOLUTION INTRAVENOUS at 10:51

## 2023-01-01 RX ADMIN — INSULIN ASPART 1 UNITS: 100 INJECTION, SOLUTION INTRAVENOUS; SUBCUTANEOUS at 18:36

## 2023-01-01 RX ADMIN — Medication 1 CAPSULE: at 09:30

## 2023-01-01 RX ADMIN — MICONAZOLE NITRATE: 20 POWDER TOPICAL at 09:17

## 2023-01-01 RX ADMIN — ACETAMINOPHEN 650 MG: 325 TABLET, FILM COATED ORAL at 14:25

## 2023-01-01 RX ADMIN — LIDOCAINE PATCH 4% 1 PATCH: 40 PATCH TOPICAL at 10:13

## 2023-01-01 RX ADMIN — CIPROFLOXACIN HYDROCHLORIDE 750 MG: 250 TABLET, FILM COATED ORAL at 22:15

## 2023-01-01 RX ADMIN — PIOGLITAZONE HYDROCHLORIDE 45 MG: 45 TABLET ORAL at 09:24

## 2023-01-01 RX ADMIN — MICONAZOLE NITRATE: 20 POWDER TOPICAL at 20:20

## 2023-01-01 RX ADMIN — TRAMADOL HYDROCHLORIDE 50 MG: 50 TABLET, COATED ORAL at 20:26

## 2023-01-01 RX ADMIN — PROPOFOL 160 MG: 10 INJECTION, EMULSION INTRAVENOUS at 10:53

## 2023-01-01 RX ADMIN — CIPROFLOXACIN HYDROCHLORIDE 500 MG: 250 TABLET, FILM COATED ORAL at 08:44

## 2023-01-01 RX ADMIN — THIAMINE HCL TAB 100 MG 100 MG: 100 TAB at 09:00

## 2023-01-01 RX ADMIN — DEXAMETHASONE SODIUM PHOSPHATE 8 MG: 4 INJECTION, SOLUTION INTRA-ARTICULAR; INTRALESIONAL; INTRAMUSCULAR; INTRAVENOUS; SOFT TISSUE at 15:19

## 2023-01-01 RX ADMIN — METRONIDAZOLE 500 MG: 500 TABLET ORAL at 08:40

## 2023-01-01 RX ADMIN — METFORMIN HYDROCHLORIDE 2000 MG: 500 TABLET, EXTENDED RELEASE ORAL at 10:34

## 2023-01-01 RX ADMIN — Medication 25 MG: at 03:37

## 2023-01-01 RX ADMIN — LIRAGLUTIDE 1.8 MG: 6 INJECTION SUBCUTANEOUS at 09:50

## 2023-01-01 RX ADMIN — AMLODIPINE BESYLATE 5 MG: 5 TABLET ORAL at 09:25

## 2023-01-01 RX ADMIN — METFORMIN ER 500 MG 2000 MG: 500 TABLET ORAL at 09:06

## 2023-01-01 RX ADMIN — MELATONIN TAB 3 MG 3 MG: 3 TAB at 21:07

## 2023-01-01 RX ADMIN — APIXABAN 5 MG: 2.5 TABLET, FILM COATED ORAL at 22:00

## 2023-01-01 RX ADMIN — FUROSEMIDE 20 MG: 20 TABLET ORAL at 09:42

## 2023-01-01 RX ADMIN — PIOGLITAZONE HYDROCHLORIDE 45 MG: 45 TABLET ORAL at 09:25

## 2023-01-01 RX ADMIN — METRONIDAZOLE 500 MG: 500 TABLET ORAL at 14:05

## 2023-01-01 RX ADMIN — METHOCARBAMOL 500 MG: 500 TABLET ORAL at 12:12

## 2023-01-01 RX ADMIN — Medication 25 MG: at 09:24

## 2023-01-01 RX ADMIN — CARVEDILOL 6.25 MG: 6.25 TABLET, FILM COATED ORAL at 11:21

## 2023-01-01 RX ADMIN — LIRAGLUTIDE 1.8 MG: 6 INJECTION SUBCUTANEOUS at 11:23

## 2023-01-01 RX ADMIN — METHOCARBAMOL 500 MG: 500 TABLET ORAL at 11:09

## 2023-01-01 RX ADMIN — METHOCARBAMOL 500 MG: 500 TABLET ORAL at 15:01

## 2023-01-01 RX ADMIN — FUROSEMIDE 20 MG: 20 TABLET ORAL at 10:15

## 2023-01-01 RX ADMIN — POLYETHYLENE GLYCOL 3350 17 G: 17 POWDER, FOR SOLUTION ORAL at 09:57

## 2023-01-01 RX ADMIN — FERROUS SULFATE TAB 325 MG (65 MG ELEMENTAL FE) 325 MG: 325 (65 FE) TAB at 12:22

## 2023-01-01 RX ADMIN — MICONAZOLE NITRATE: 20 POWDER TOPICAL at 21:04

## 2023-01-01 RX ADMIN — Medication 5 ML: at 14:24

## 2023-01-01 RX ADMIN — APIXABAN 5 MG: 5 TABLET, FILM COATED ORAL at 08:28

## 2023-01-01 RX ADMIN — METFORMIN ER 500 MG 2000 MG: 500 TABLET ORAL at 10:13

## 2023-01-01 RX ADMIN — MAGNESIUM SULFATE HEPTAHYDRATE 4 G: 40 INJECTION, SOLUTION INTRAVENOUS at 17:12

## 2023-01-01 RX ADMIN — PIOGLITAZONE HYDROCHLORIDE 45 MG: 45 TABLET ORAL at 09:46

## 2023-01-01 RX ADMIN — SENNOSIDES AND DOCUSATE SODIUM 1 TABLET: 50; 8.6 TABLET ORAL at 09:25

## 2023-01-01 RX ADMIN — AMLODIPINE BESYLATE 5 MG: 5 TABLET ORAL at 09:04

## 2023-01-01 RX ADMIN — OXYCODONE HYDROCHLORIDE 10 MG: 5 TABLET ORAL at 22:25

## 2023-01-01 RX ADMIN — PIOGLITAZONE HYDROCHLORIDE 45 MG: 45 TABLET ORAL at 09:42

## 2023-01-01 RX ADMIN — APIXABAN 5 MG: 5 TABLET, FILM COATED ORAL at 08:04

## 2023-01-01 RX ADMIN — Medication 50 MG: at 16:39

## 2023-01-01 RX ADMIN — ATORVASTATIN CALCIUM 40 MG: 40 TABLET, FILM COATED ORAL at 11:09

## 2023-01-01 RX ADMIN — ATORVASTATIN CALCIUM 40 MG: 40 TABLET, FILM COATED ORAL at 08:14

## 2023-01-01 RX ADMIN — ATORVASTATIN CALCIUM 40 MG: 40 TABLET, FILM COATED ORAL at 11:07

## 2023-01-01 RX ADMIN — METHOCARBAMOL 500 MG: 500 TABLET ORAL at 20:45

## 2023-01-01 RX ADMIN — Medication 1 CAPSULE: at 20:30

## 2023-01-01 RX ADMIN — OXYCODONE HYDROCHLORIDE 10 MG: 5 TABLET ORAL at 08:39

## 2023-01-01 RX ADMIN — Medication 50 MG: at 09:22

## 2023-01-01 RX ADMIN — ACETAMINOPHEN 975 MG: 325 TABLET, FILM COATED ORAL at 21:31

## 2023-01-01 RX ADMIN — MULTIPLE VITAMINS W/ MINERALS TAB 1 TABLET: TAB at 13:53

## 2023-01-01 RX ADMIN — TRAMADOL HYDROCHLORIDE 50 MG: 50 TABLET, COATED ORAL at 08:56

## 2023-01-01 RX ADMIN — CARVEDILOL 6.25 MG: 3.12 TABLET, FILM COATED ORAL at 18:37

## 2023-01-01 RX ADMIN — POTASSIUM CHLORIDE 40 MEQ: 750 TABLET, EXTENDED RELEASE ORAL at 05:11

## 2023-01-01 RX ADMIN — TRAMADOL HYDROCHLORIDE 50 MG: 50 TABLET, COATED ORAL at 06:29

## 2023-01-01 RX ADMIN — ATORVASTATIN CALCIUM 40 MG: 40 TABLET, FILM COATED ORAL at 08:34

## 2023-01-01 RX ADMIN — APIXABAN 5 MG: 2.5 TABLET, FILM COATED ORAL at 21:35

## 2023-01-01 RX ADMIN — METHOCARBAMOL 500 MG: 500 TABLET ORAL at 22:16

## 2023-01-01 RX ADMIN — DOXYCYCLINE 100 MG: 100 CAPSULE ORAL at 21:05

## 2023-01-01 RX ADMIN — ACETAMINOPHEN 1000 MG: 325 TABLET ORAL at 09:41

## 2023-01-01 RX ADMIN — AMLODIPINE BESYLATE 10 MG: 10 TABLET ORAL at 10:37

## 2023-01-01 RX ADMIN — HEPARIN, PORCINE (PF) 10 UNIT/ML INTRAVENOUS SYRINGE 5 ML: at 08:46

## 2023-01-01 RX ADMIN — FUROSEMIDE 60 MG: 40 TABLET ORAL at 15:56

## 2023-01-01 RX ADMIN — CARVEDILOL 6.25 MG: 6.25 TABLET, FILM COATED ORAL at 19:39

## 2023-01-01 RX ADMIN — FOLIC ACID 1 MG: 1 TABLET ORAL at 13:09

## 2023-01-01 RX ADMIN — LIRAGLUTIDE 1.8 MG: 6 INJECTION SUBCUTANEOUS at 08:39

## 2023-01-01 RX ADMIN — FUROSEMIDE 60 MG: 40 TABLET ORAL at 16:51

## 2023-01-01 RX ADMIN — POLYETHYLENE GLYCOL 3350 17 G: 17 POWDER, FOR SOLUTION ORAL at 23:14

## 2023-01-01 RX ADMIN — Medication 25 MG: at 22:28

## 2023-01-01 RX ADMIN — THIAMINE HCL TAB 100 MG 100 MG: 100 TAB at 09:57

## 2023-01-01 RX ADMIN — SODIUM CHLORIDE 500 ML: 9 INJECTION, SOLUTION INTRAVENOUS at 09:48

## 2023-01-01 RX ADMIN — ACETAMINOPHEN 325MG 975 MG: 325 TABLET ORAL at 11:10

## 2023-01-01 RX ADMIN — TRAMADOL HYDROCHLORIDE 50 MG: 50 TABLET, COATED ORAL at 11:37

## 2023-01-01 RX ADMIN — Medication 3 MG: at 21:02

## 2023-01-01 RX ADMIN — ACETAMINOPHEN 650 MG: 325 TABLET, FILM COATED ORAL at 22:54

## 2023-01-01 RX ADMIN — CARVEDILOL 6.25 MG: 3.12 TABLET, FILM COATED ORAL at 09:37

## 2023-01-01 RX ADMIN — Medication 1 CAPSULE: at 20:40

## 2023-01-01 RX ADMIN — APIXABAN 5 MG: 5 TABLET, FILM COATED ORAL at 20:24

## 2023-01-01 RX ADMIN — METHOCARBAMOL 500 MG: 500 TABLET ORAL at 22:32

## 2023-01-01 RX ADMIN — Medication 5 ML: at 14:52

## 2023-01-01 RX ADMIN — SENNOSIDES AND DOCUSATE SODIUM 1 TABLET: 50; 8.6 TABLET ORAL at 11:07

## 2023-01-01 RX ADMIN — FOLIC ACID 1 MG: 1 TABLET ORAL at 12:19

## 2023-01-01 RX ADMIN — CARVEDILOL 6.25 MG: 3.12 TABLET, FILM COATED ORAL at 08:42

## 2023-01-01 RX ADMIN — ACETAMINOPHEN 1000 MG: 500 TABLET ORAL at 09:00

## 2023-01-01 RX ADMIN — ACETAMINOPHEN 650 MG: 325 TABLET, FILM COATED ORAL at 04:37

## 2023-01-01 RX ADMIN — Medication 50 MCG: at 09:25

## 2023-01-01 RX ADMIN — ATORVASTATIN CALCIUM 40 MG: 40 TABLET, FILM COATED ORAL at 07:35

## 2023-01-01 RX ADMIN — APIXABAN 2.5 MG: 2.5 TABLET, FILM COATED ORAL at 19:44

## 2023-01-01 RX ADMIN — PHENYLEPHRINE HYDROCHLORIDE 100 MCG: 10 INJECTION INTRAVENOUS at 15:58

## 2023-01-01 RX ADMIN — SENNOSIDES AND DOCUSATE SODIUM 1 TABLET: 50; 8.6 TABLET ORAL at 09:19

## 2023-01-01 RX ADMIN — LIDOCAINE PATCH 4% 1 PATCH: 40 PATCH TOPICAL at 08:55

## 2023-01-01 RX ADMIN — OXYCODONE HYDROCHLORIDE 5 MG: 5 TABLET ORAL at 09:20

## 2023-01-01 RX ADMIN — TRAMADOL HYDROCHLORIDE 50 MG: 50 TABLET, COATED ORAL at 04:22

## 2023-01-01 RX ADMIN — METHOCARBAMOL 500 MG: 500 TABLET ORAL at 21:58

## 2023-01-01 RX ADMIN — SENNOSIDES AND DOCUSATE SODIUM 1 TABLET: 50; 8.6 TABLET ORAL at 08:56

## 2023-01-01 RX ADMIN — SENNOSIDES AND DOCUSATE SODIUM 1 TABLET: 8.6; 5 TABLET ORAL at 21:40

## 2023-01-01 RX ADMIN — DOCUSATE SODIUM 100 MG: 100 CAPSULE, LIQUID FILLED ORAL at 21:52

## 2023-01-01 RX ADMIN — APIXABAN 5 MG: 2.5 TABLET, FILM COATED ORAL at 21:03

## 2023-01-01 RX ADMIN — THIAMINE HYDROCHLORIDE 500 MG: 100 INJECTION, SOLUTION INTRAMUSCULAR; INTRAVENOUS at 14:51

## 2023-01-01 RX ADMIN — FOLIC ACID 1 MG: 1 TABLET ORAL at 13:49

## 2023-01-01 RX ADMIN — Medication 1 CAPSULE: at 08:17

## 2023-01-01 RX ADMIN — CARVEDILOL 6.25 MG: 3.12 TABLET, FILM COATED ORAL at 18:19

## 2023-01-01 RX ADMIN — FOLIC ACID 1 MG: 1 TABLET ORAL at 09:01

## 2023-01-01 RX ADMIN — CIPROFLOXACIN HYDROCHLORIDE 500 MG: 500 TABLET, FILM COATED ORAL at 19:44

## 2023-01-01 RX ADMIN — CEFTAZIDIME 2 G: 2 INJECTION, POWDER, FOR SOLUTION INTRAVENOUS at 20:18

## 2023-01-01 RX ADMIN — VANCOMYCIN HYDROCHLORIDE 1000 MG: 1 INJECTION, SOLUTION INTRAVENOUS at 09:15

## 2023-01-01 RX ADMIN — ACETAMINOPHEN 325MG 975 MG: 325 TABLET ORAL at 08:06

## 2023-01-01 RX ADMIN — THIAMINE HCL TAB 100 MG 100 MG: 100 TAB at 11:58

## 2023-01-01 RX ADMIN — APIXABAN 5 MG: 2.5 TABLET, FILM COATED ORAL at 09:33

## 2023-01-01 RX ADMIN — ACETAMINOPHEN 1000 MG: 500 TABLET ORAL at 09:48

## 2023-01-01 RX ADMIN — DOXYCYCLINE HYCLATE 100 MG: 100 CAPSULE ORAL at 09:37

## 2023-01-01 RX ADMIN — LIRAGLUTIDE 1.8 MG: 6 INJECTION SUBCUTANEOUS at 11:05

## 2023-01-01 RX ADMIN — Medication 25 MG: at 15:39

## 2023-01-01 RX ADMIN — FUROSEMIDE 20 MG: 20 TABLET ORAL at 10:06

## 2023-01-01 RX ADMIN — SENNOSIDES AND DOCUSATE SODIUM 1 TABLET: 50; 8.6 TABLET ORAL at 20:25

## 2023-01-01 RX ADMIN — ACETAMINOPHEN 650 MG: 325 TABLET, FILM COATED ORAL at 17:40

## 2023-01-01 RX ADMIN — Medication 50 MCG: at 08:06

## 2023-01-01 RX ADMIN — INSULIN ASPART 2 UNITS: 100 INJECTION, SOLUTION INTRAVENOUS; SUBCUTANEOUS at 12:28

## 2023-01-01 RX ADMIN — DOXYCYCLINE 100 MG: 100 CAPSULE ORAL at 18:58

## 2023-01-01 RX ADMIN — APIXABAN 5 MG: 2.5 TABLET, FILM COATED ORAL at 09:07

## 2023-01-01 RX ADMIN — HYDROMORPHONE HYDROCHLORIDE 0.2 MG: 1 INJECTION, SOLUTION INTRAMUSCULAR; INTRAVENOUS; SUBCUTANEOUS at 17:01

## 2023-01-01 RX ADMIN — FERROUS SULFATE TAB 325 MG (65 MG ELEMENTAL FE) 325 MG: 325 (65 FE) TAB at 12:12

## 2023-01-01 RX ADMIN — DOXYCYCLINE HYCLATE 100 MG: 100 CAPSULE ORAL at 23:29

## 2023-01-01 RX ADMIN — APIXABAN 5 MG: 2.5 TABLET, FILM COATED ORAL at 09:42

## 2023-01-01 RX ADMIN — LIRAGLUTIDE 1.8 MG: 6 INJECTION SUBCUTANEOUS at 11:37

## 2023-01-01 RX ADMIN — AMLODIPINE BESYLATE 10 MG: 10 TABLET ORAL at 11:58

## 2023-01-01 RX ADMIN — FOLIC ACID 1 MG: 1 TABLET ORAL at 08:39

## 2023-01-01 RX ADMIN — DOXYCYCLINE HYCLATE 100 MG: 100 CAPSULE ORAL at 12:44

## 2023-01-01 RX ADMIN — METFORMIN HYDROCHLORIDE 2000 MG: 500 TABLET, EXTENDED RELEASE ORAL at 10:00

## 2023-01-01 RX ADMIN — ATORVASTATIN CALCIUM 40 MG: 40 TABLET, FILM COATED ORAL at 10:05

## 2023-01-01 RX ADMIN — CEFTAZIDIME 2 G: 2 INJECTION, POWDER, FOR SOLUTION INTRAVENOUS at 21:31

## 2023-01-01 RX ADMIN — LIRAGLUTIDE 1.8 MG: 6 INJECTION SUBCUTANEOUS at 09:19

## 2023-01-01 RX ADMIN — CIPROFLOXACIN HYDROCHLORIDE 500 MG: 500 TABLET, FILM COATED ORAL at 05:15

## 2023-01-01 RX ADMIN — CIPROFLOXACIN HYDROCHLORIDE 500 MG: 500 TABLET, FILM COATED ORAL at 18:33

## 2023-01-01 RX ADMIN — MICONAZOLE NITRATE: 20 POWDER TOPICAL at 19:56

## 2023-01-01 RX ADMIN — MICONAZOLE NITRATE: 20 POWDER TOPICAL at 12:12

## 2023-01-01 RX ADMIN — Medication 5 ML: at 18:53

## 2023-01-01 RX ADMIN — TRAMADOL HYDROCHLORIDE 50 MG: 50 TABLET, COATED ORAL at 08:29

## 2023-01-01 RX ADMIN — Medication 10 MG: at 16:20

## 2023-01-01 RX ADMIN — APIXABAN 5 MG: 5 TABLET, FILM COATED ORAL at 20:29

## 2023-01-01 RX ADMIN — FUROSEMIDE 20 MG: 20 TABLET ORAL at 10:58

## 2023-01-01 RX ADMIN — METHOCARBAMOL 500 MG: 500 TABLET ORAL at 19:39

## 2023-01-01 RX ADMIN — CEFEPIME HYDROCHLORIDE 2 G: 2 INJECTION, POWDER, FOR SOLUTION INTRAVENOUS at 00:33

## 2023-01-01 RX ADMIN — MAGNESIUM SULFATE HEPTAHYDRATE 1 G: 1 INJECTION, SOLUTION INTRAVENOUS at 17:44

## 2023-01-01 RX ADMIN — FOLIC ACID 1 MG: 1 TABLET ORAL at 09:37

## 2023-01-01 RX ADMIN — APIXABAN 5 MG: 5 TABLET, FILM COATED ORAL at 20:47

## 2023-01-01 RX ADMIN — TRAMADOL HYDROCHLORIDE 50 MG: 50 TABLET, COATED ORAL at 02:33

## 2023-01-01 RX ADMIN — ACETAMINOPHEN 325MG 975 MG: 325 TABLET ORAL at 20:02

## 2023-01-01 RX ADMIN — Medication 25 MG: at 22:01

## 2023-01-01 RX ADMIN — METHOCARBAMOL 500 MG: 500 TABLET ORAL at 18:55

## 2023-01-01 RX ADMIN — THIAMINE HYDROCHLORIDE 500 MG: 100 INJECTION, SOLUTION INTRAMUSCULAR; INTRAVENOUS at 09:03

## 2023-01-01 RX ADMIN — APIXABAN 2.5 MG: 2.5 TABLET, FILM COATED ORAL at 20:14

## 2023-01-01 RX ADMIN — MAGNESIUM SULFATE HEPTAHYDRATE 1 G: 1 INJECTION, SOLUTION INTRAVENOUS at 22:44

## 2023-01-01 RX ADMIN — APIXABAN 5 MG: 2.5 TABLET, FILM COATED ORAL at 09:37

## 2023-01-01 RX ADMIN — NAPROXEN 500 MG: 250 TABLET ORAL at 11:50

## 2023-01-01 RX ADMIN — Medication 5 ML: at 18:38

## 2023-01-01 RX ADMIN — APIXABAN 5 MG: 2.5 TABLET, FILM COATED ORAL at 09:15

## 2023-01-01 RX ADMIN — FOLIC ACID 1 MG: 1 TABLET ORAL at 09:18

## 2023-01-01 RX ADMIN — ACETAMINOPHEN 975 MG: 325 TABLET, FILM COATED ORAL at 13:09

## 2023-01-01 RX ADMIN — APIXABAN 5 MG: 2.5 TABLET, FILM COATED ORAL at 08:18

## 2023-01-01 RX ADMIN — ATORVASTATIN CALCIUM 40 MG: 40 TABLET, FILM COATED ORAL at 11:29

## 2023-01-01 RX ADMIN — LIDOCAINE HYDROCHLORIDE 100 MG: 20 INJECTION, SOLUTION INFILTRATION; PERINEURAL at 10:53

## 2023-01-01 RX ADMIN — APIXABAN 5 MG: 2.5 TABLET, FILM COATED ORAL at 19:48

## 2023-01-01 RX ADMIN — Medication 3 MG: at 23:29

## 2023-01-01 RX ADMIN — FERROUS SULFATE TAB 325 MG (65 MG ELEMENTAL FE) 325 MG: 325 (65 FE) TAB at 12:38

## 2023-01-01 RX ADMIN — PIOGLITAZONE HYDROCHLORIDE 45 MG: 45 TABLET ORAL at 10:54

## 2023-01-01 RX ADMIN — VANCOMYCIN HYDROCHLORIDE 1500 MG: 10 INJECTION, POWDER, LYOPHILIZED, FOR SOLUTION INTRAVENOUS at 21:10

## 2023-01-01 RX ADMIN — FOLIC ACID 1 MG: 1 TABLET ORAL at 09:29

## 2023-01-01 RX ADMIN — HEPARIN, PORCINE (PF) 10 UNIT/ML INTRAVENOUS SYRINGE 5 ML: at 07:10

## 2023-01-01 RX ADMIN — CIPROFLOXACIN HYDROCHLORIDE 500 MG: 500 TABLET, FILM COATED ORAL at 21:22

## 2023-01-01 RX ADMIN — MICONAZOLE NITRATE: 20 POWDER TOPICAL at 10:40

## 2023-01-01 RX ADMIN — APIXABAN 2.5 MG: 2.5 TABLET, FILM COATED ORAL at 08:36

## 2023-01-01 RX ADMIN — HEPARIN, PORCINE (PF) 10 UNIT/ML INTRAVENOUS SYRINGE 5 ML: at 21:58

## 2023-01-01 RX ADMIN — Medication 2.5 MG: at 02:25

## 2023-01-01 RX ADMIN — ACETAMINOPHEN 1000 MG: 325 TABLET ORAL at 16:39

## 2023-01-01 RX ADMIN — MULTIPLE VITAMINS W/ MINERALS TAB 1 TABLET: TAB at 12:38

## 2023-01-01 RX ADMIN — INSULIN ASPART 1 UNITS: 100 INJECTION, SOLUTION INTRAVENOUS; SUBCUTANEOUS at 18:02

## 2023-01-01 RX ADMIN — LIDOCAINE PATCH 4% 1 PATCH: 40 PATCH TOPICAL at 11:48

## 2023-01-01 RX ADMIN — ATORVASTATIN CALCIUM 40 MG: 40 TABLET, FILM COATED ORAL at 10:31

## 2023-01-01 RX ADMIN — DOXYCYCLINE HYCLATE 100 MG: 100 CAPSULE ORAL at 20:04

## 2023-01-01 RX ADMIN — ACETAMINOPHEN 325MG 975 MG: 325 TABLET ORAL at 20:40

## 2023-01-01 RX ADMIN — PIOGLITAZONE HYDROCHLORIDE 45 MG: 45 TABLET ORAL at 10:31

## 2023-01-01 RX ADMIN — Medication 1 CAPSULE: at 08:47

## 2023-01-01 RX ADMIN — SODIUM CHLORIDE, POTASSIUM CHLORIDE, SODIUM LACTATE AND CALCIUM CHLORIDE 1000 ML: 600; 310; 30; 20 INJECTION, SOLUTION INTRAVENOUS at 21:50

## 2023-01-01 RX ADMIN — Medication 1 CAPSULE: at 21:59

## 2023-01-01 RX ADMIN — Medication 1 CAPSULE: at 21:50

## 2023-01-01 RX ADMIN — CEFEPIME HYDROCHLORIDE 2 G: 2 INJECTION, POWDER, FOR SOLUTION INTRAVENOUS at 00:53

## 2023-01-01 RX ADMIN — METHOCARBAMOL 500 MG: 500 TABLET ORAL at 16:39

## 2023-01-01 RX ADMIN — ACETAMINOPHEN 1000 MG: 325 TABLET ORAL at 10:33

## 2023-01-01 RX ADMIN — Medication 1 CAPSULE: at 20:37

## 2023-01-01 RX ADMIN — DOCUSATE SODIUM 100 MG: 100 CAPSULE, LIQUID FILLED ORAL at 20:24

## 2023-01-01 RX ADMIN — Medication 25 MG: at 09:07

## 2023-01-01 RX ADMIN — Medication 25 MG: at 09:00

## 2023-01-01 RX ADMIN — FERROUS SULFATE TAB 325 MG (65 MG ELEMENTAL FE) 325 MG: 325 (65 FE) TAB at 11:26

## 2023-01-01 RX ADMIN — Medication 15 ML: at 14:57

## 2023-01-01 RX ADMIN — Medication 50 MG: at 10:12

## 2023-01-01 RX ADMIN — APIXABAN 5 MG: 5 TABLET, FILM COATED ORAL at 08:30

## 2023-01-01 RX ADMIN — LIRAGLUTIDE 1.8 MG: 6 INJECTION SUBCUTANEOUS at 08:30

## 2023-01-01 RX ADMIN — SODIUM CHLORIDE, POTASSIUM CHLORIDE, SODIUM LACTATE AND CALCIUM CHLORIDE: 600; 310; 30; 20 INJECTION, SOLUTION INTRAVENOUS at 20:07

## 2023-01-01 RX ADMIN — TAMSULOSIN HYDROCHLORIDE 0.4 MG: 0.4 CAPSULE ORAL at 08:10

## 2023-01-01 RX ADMIN — MICONAZOLE NITRATE: 20 POWDER TOPICAL at 08:20

## 2023-01-01 RX ADMIN — METHOCARBAMOL 500 MG: 500 TABLET ORAL at 22:27

## 2023-01-01 RX ADMIN — MULTIPLE VITAMINS W/ MINERALS TAB 1 TABLET: TAB at 08:33

## 2023-01-01 RX ADMIN — DICLOFENAC SODIUM 2 G: 10 GEL TOPICAL at 15:58

## 2023-01-01 RX ADMIN — MICONAZOLE NITRATE: 20 POWDER TOPICAL at 20:55

## 2023-01-01 RX ADMIN — CEFEPIME HYDROCHLORIDE 2 G: 2 INJECTION, POWDER, FOR SOLUTION INTRAVENOUS at 12:31

## 2023-01-01 RX ADMIN — APIXABAN 2.5 MG: 2.5 TABLET, FILM COATED ORAL at 09:23

## 2023-01-01 RX ADMIN — VANCOMYCIN HYDROCHLORIDE 1500 MG: 10 INJECTION, POWDER, LYOPHILIZED, FOR SOLUTION INTRAVENOUS at 22:49

## 2023-01-01 RX ADMIN — ACETAMINOPHEN 325MG 975 MG: 325 TABLET ORAL at 11:25

## 2023-01-01 RX ADMIN — CARVEDILOL 6.25 MG: 6.25 TABLET, FILM COATED ORAL at 18:29

## 2023-01-01 RX ADMIN — APIXABAN 5 MG: 2.5 TABLET, FILM COATED ORAL at 09:38

## 2023-01-01 RX ADMIN — MICONAZOLE NITRATE: 20 POWDER TOPICAL at 21:19

## 2023-01-01 RX ADMIN — FUROSEMIDE 60 MG: 40 TABLET ORAL at 15:25

## 2023-01-01 RX ADMIN — ACETAMINOPHEN 975 MG: 325 TABLET, FILM COATED ORAL at 19:56

## 2023-01-01 RX ADMIN — MICONAZOLE NITRATE: 20 POWDER TOPICAL at 08:55

## 2023-01-01 RX ADMIN — DICLOFENAC SODIUM 2 G: 10 GEL TOPICAL at 11:25

## 2023-01-01 RX ADMIN — APIXABAN 5 MG: 2.5 TABLET, FILM COATED ORAL at 21:52

## 2023-01-01 RX ADMIN — SENNOSIDES AND DOCUSATE SODIUM 1 TABLET: 8.6; 5 TABLET ORAL at 21:11

## 2023-01-01 RX ADMIN — DEXTROSE MONOHYDRATE: 50 INJECTION, SOLUTION INTRAVENOUS at 10:55

## 2023-01-01 RX ADMIN — MICONAZOLE NITRATE: 20 POWDER TOPICAL at 11:00

## 2023-01-01 RX ADMIN — ACETAMINOPHEN 1000 MG: 500 TABLET ORAL at 09:36

## 2023-01-01 RX ADMIN — LIDOCAINE PATCH 4% 1 PATCH: 40 PATCH TOPICAL at 18:32

## 2023-01-01 RX ADMIN — MELATONIN TAB 3 MG 3 MG: 3 TAB at 21:44

## 2023-01-01 RX ADMIN — CIPROFLOXACIN HYDROCHLORIDE 500 MG: 500 TABLET, FILM COATED ORAL at 18:32

## 2023-01-01 RX ADMIN — ATORVASTATIN CALCIUM 40 MG: 40 TABLET, FILM COATED ORAL at 10:12

## 2023-01-01 RX ADMIN — OXYCODONE HYDROCHLORIDE 5 MG: 5 TABLET ORAL at 06:06

## 2023-01-01 RX ADMIN — DOXYCYCLINE 100 MG: 100 CAPSULE ORAL at 18:42

## 2023-01-01 RX ADMIN — THIAMINE HCL TAB 100 MG 100 MG: 100 TAB at 12:16

## 2023-01-01 RX ADMIN — HEPARIN, PORCINE (PF) 10 UNIT/ML INTRAVENOUS SYRINGE 5 ML: at 08:35

## 2023-01-01 RX ADMIN — METHOCARBAMOL 500 MG: 500 TABLET ORAL at 08:53

## 2023-01-01 RX ADMIN — POLYETHYLENE GLYCOL 3350 17 G: 17 POWDER, FOR SOLUTION ORAL at 09:06

## 2023-01-01 RX ADMIN — Medication 25 MG: at 21:52

## 2023-01-01 RX ADMIN — ACETAMINOPHEN 1000 MG: 325 TABLET ORAL at 16:31

## 2023-01-01 RX ADMIN — LIRAGLUTIDE 1.8 MG: 6 INJECTION SUBCUTANEOUS at 09:12

## 2023-01-01 RX ADMIN — Medication 5 ML: at 13:52

## 2023-01-01 RX ADMIN — Medication 25 MG: at 10:45

## 2023-01-01 RX ADMIN — MICONAZOLE NITRATE: 20 POWDER TOPICAL at 21:11

## 2023-01-01 RX ADMIN — APIXABAN 5 MG: 2.5 TABLET, FILM COATED ORAL at 10:32

## 2023-01-01 RX ADMIN — ACETAMINOPHEN 650 MG: 325 TABLET, FILM COATED ORAL at 16:03

## 2023-01-01 RX ADMIN — MULTIPLE VITAMINS W/ MINERALS TAB 1 TABLET: TAB at 11:26

## 2023-01-01 RX ADMIN — ACETAMINOPHEN 1000 MG: 500 TABLET ORAL at 08:30

## 2023-01-01 RX ADMIN — METFORMIN ER 500 MG 2000 MG: 500 TABLET ORAL at 10:37

## 2023-01-01 RX ADMIN — TOPICAL ANESTHETIC 0.5 ML: 200 SPRAY DENTAL; PERIODONTAL at 10:02

## 2023-01-01 RX ADMIN — ACETAMINOPHEN 650 MG: 325 TABLET, FILM COATED ORAL at 14:51

## 2023-01-01 RX ADMIN — MICONAZOLE NITRATE: 20 POWDER TOPICAL at 23:38

## 2023-01-01 RX ADMIN — DOXYCYCLINE 100 MG: 100 CAPSULE ORAL at 21:59

## 2023-01-01 RX ADMIN — CIPROFLOXACIN HYDROCHLORIDE 500 MG: 500 TABLET, FILM COATED ORAL at 11:10

## 2023-01-01 RX ADMIN — METRONIDAZOLE 500 MG: 500 TABLET ORAL at 21:11

## 2023-01-01 RX ADMIN — METFORMIN ER 500 MG 2000 MG: 500 TABLET ORAL at 11:09

## 2023-01-01 RX ADMIN — APIXABAN 5 MG: 2.5 TABLET, FILM COATED ORAL at 09:11

## 2023-01-01 RX ADMIN — MICONAZOLE NITRATE: 20 POWDER TOPICAL at 09:12

## 2023-01-01 RX ADMIN — Medication 1 CAPSULE: at 20:17

## 2023-01-01 RX ADMIN — MICONAZOLE NITRATE: 20 POWDER TOPICAL at 21:17

## 2023-01-01 RX ADMIN — Medication 25 MG: at 16:47

## 2023-01-01 RX ADMIN — FUROSEMIDE 60 MG: 40 TABLET ORAL at 17:10

## 2023-01-01 RX ADMIN — Medication 1 CAPSULE: at 22:34

## 2023-01-01 RX ADMIN — MULTIPLE VITAMINS W/ MINERALS TAB 1 TABLET: TAB at 12:41

## 2023-01-01 RX ADMIN — CEFTAZIDIME 2 G: 2 INJECTION, POWDER, FOR SOLUTION INTRAVENOUS at 21:15

## 2023-01-01 RX ADMIN — ATORVASTATIN CALCIUM 40 MG: 40 TABLET, FILM COATED ORAL at 11:57

## 2023-01-01 RX ADMIN — APIXABAN 5 MG: 2.5 TABLET, FILM COATED ORAL at 10:05

## 2023-01-01 RX ADMIN — ACETAMINOPHEN 650 MG: 325 TABLET, FILM COATED ORAL at 09:54

## 2023-01-01 RX ADMIN — APIXABAN 5 MG: 5 TABLET, FILM COATED ORAL at 09:19

## 2023-01-01 RX ADMIN — SENNOSIDES AND DOCUSATE SODIUM 1 TABLET: 8.6; 5 TABLET ORAL at 21:46

## 2023-01-01 RX ADMIN — ACETAMINOPHEN 325MG 975 MG: 325 TABLET ORAL at 23:20

## 2023-01-01 RX ADMIN — METFORMIN ER 500 MG 2000 MG: 500 TABLET ORAL at 09:12

## 2023-01-01 RX ADMIN — ATORVASTATIN CALCIUM 40 MG: 40 TABLET, FILM COATED ORAL at 09:38

## 2023-01-01 RX ADMIN — OXYCODONE HYDROCHLORIDE 10 MG: 5 TABLET ORAL at 13:52

## 2023-01-01 RX ADMIN — ACETAMINOPHEN 1000 MG: 500 TABLET ORAL at 09:58

## 2023-01-01 RX ADMIN — TRAMADOL HYDROCHLORIDE 50 MG: 50 TABLET, COATED ORAL at 08:17

## 2023-01-01 RX ADMIN — TRAMADOL HYDROCHLORIDE 50 MG: 50 TABLET, COATED ORAL at 21:59

## 2023-01-01 RX ADMIN — METHOCARBAMOL 500 MG: 500 TABLET ORAL at 09:46

## 2023-01-01 RX ADMIN — ACETAMINOPHEN 650 MG: 325 TABLET, FILM COATED ORAL at 08:19

## 2023-01-01 RX ADMIN — Medication 25 MG: at 22:32

## 2023-01-01 RX ADMIN — ACETAMINOPHEN 1000 MG: 500 TABLET ORAL at 00:36

## 2023-01-01 RX ADMIN — AMLODIPINE BESYLATE 10 MG: 10 TABLET ORAL at 11:11

## 2023-01-01 RX ADMIN — ACETAMINOPHEN 975 MG: 325 TABLET, FILM COATED ORAL at 14:44

## 2023-01-01 RX ADMIN — FERROUS SULFATE TAB 325 MG (65 MG ELEMENTAL FE) 325 MG: 325 (65 FE) TAB at 13:03

## 2023-01-01 RX ADMIN — MICONAZOLE NITRATE: 20 POWDER TOPICAL at 21:30

## 2023-01-01 RX ADMIN — Medication 50 MCG: at 10:36

## 2023-01-01 RX ADMIN — CIPROFLOXACIN HYDROCHLORIDE 500 MG: 500 TABLET, FILM COATED ORAL at 20:23

## 2023-01-01 RX ADMIN — DOXYCYCLINE 100 MG: 100 CAPSULE ORAL at 09:23

## 2023-01-01 RX ADMIN — METHOCARBAMOL 500 MG: 500 TABLET ORAL at 21:25

## 2023-01-01 RX ADMIN — LIDOCAINE PATCH 4% 1 PATCH: 40 PATCH TOPICAL at 08:37

## 2023-01-01 RX ADMIN — APIXABAN 5 MG: 2.5 TABLET, FILM COATED ORAL at 09:43

## 2023-01-01 RX ADMIN — ACETAMINOPHEN 1000 MG: 500 TABLET ORAL at 15:39

## 2023-01-01 RX ADMIN — ACETAMINOPHEN 650 MG: 325 TABLET, FILM COATED ORAL at 15:13

## 2023-01-01 RX ADMIN — Medication 1 CAPSULE: at 11:37

## 2023-01-01 RX ADMIN — Medication 50 MCG: at 08:41

## 2023-01-01 RX ADMIN — APIXABAN 5 MG: 2.5 TABLET, FILM COATED ORAL at 22:05

## 2023-01-01 RX ADMIN — LIDOCAINE PATCH 4% 1 PATCH: 40 PATCH TOPICAL at 18:58

## 2023-01-01 RX ADMIN — METFORMIN ER 500 MG 2000 MG: 500 TABLET ORAL at 08:55

## 2023-01-01 RX ADMIN — DICLOFENAC SODIUM 2 G: 10 GEL TOPICAL at 12:15

## 2023-01-01 RX ADMIN — FUROSEMIDE 20 MG: 20 TABLET ORAL at 08:40

## 2023-01-01 RX ADMIN — ACETAMINOPHEN 325MG 975 MG: 325 TABLET ORAL at 21:47

## 2023-01-01 RX ADMIN — METFORMIN ER 500 MG 2000 MG: 500 TABLET ORAL at 13:35

## 2023-01-01 RX ADMIN — Medication 25 MG: at 21:58

## 2023-01-01 RX ADMIN — METHOCARBAMOL 500 MG: 500 TABLET ORAL at 08:28

## 2023-01-01 RX ADMIN — PIOGLITAZONE HYDROCHLORIDE 45 MG: 45 TABLET ORAL at 12:28

## 2023-01-01 RX ADMIN — ACETAMINOPHEN 325MG 975 MG: 325 TABLET ORAL at 11:36

## 2023-01-01 RX ADMIN — METFORMIN HYDROCHLORIDE 2000 MG: 500 TABLET, EXTENDED RELEASE ORAL at 08:17

## 2023-01-01 RX ADMIN — OXYCODONE HYDROCHLORIDE 5 MG: 5 TABLET ORAL at 16:29

## 2023-01-01 RX ADMIN — SENNOSIDES AND DOCUSATE SODIUM 1 TABLET: 50; 8.6 TABLET ORAL at 20:01

## 2023-01-01 RX ADMIN — ACETAMINOPHEN 650 MG: 325 TABLET, FILM COATED ORAL at 22:43

## 2023-01-01 RX ADMIN — SODIUM CHLORIDE, POTASSIUM CHLORIDE, SODIUM LACTATE AND CALCIUM CHLORIDE: 600; 310; 30; 20 INJECTION, SOLUTION INTRAVENOUS at 22:36

## 2023-01-01 RX ADMIN — MULTIPLE VITAMINS W/ MINERALS TAB 1 TABLET: TAB at 11:37

## 2023-01-01 RX ADMIN — PHENYLEPHRINE HYDROCHLORIDE 200 MCG: 10 INJECTION INTRAVENOUS at 16:15

## 2023-01-01 RX ADMIN — MICONAZOLE NITRATE: 20 POWDER TOPICAL at 09:53

## 2023-01-01 RX ADMIN — CIPROFLOXACIN HYDROCHLORIDE 500 MG: 500 TABLET, FILM COATED ORAL at 23:29

## 2023-01-01 RX ADMIN — HEPARIN, PORCINE (PF) 10 UNIT/ML INTRAVENOUS SYRINGE 2 ML: at 13:27

## 2023-01-01 RX ADMIN — DICLOFENAC SODIUM 2 G: 10 GEL TOPICAL at 09:30

## 2023-01-01 RX ADMIN — ACETAMINOPHEN 650 MG: 325 TABLET, FILM COATED ORAL at 20:17

## 2023-01-01 RX ADMIN — AMLODIPINE BESYLATE 10 MG: 10 TABLET ORAL at 09:56

## 2023-01-01 RX ADMIN — SENNOSIDES AND DOCUSATE SODIUM 1 TABLET: 8.6; 5 TABLET ORAL at 21:44

## 2023-01-01 RX ADMIN — AMLODIPINE BESYLATE 10 MG: 10 TABLET ORAL at 11:33

## 2023-01-01 RX ADMIN — Medication 1 CAPSULE: at 10:03

## 2023-01-01 RX ADMIN — DOXYCYCLINE 100 MG: 100 CAPSULE ORAL at 22:18

## 2023-01-01 RX ADMIN — SENNOSIDES AND DOCUSATE SODIUM 1 TABLET: 50; 8.6 TABLET ORAL at 21:41

## 2023-01-01 RX ADMIN — SODIUM CHLORIDE, SODIUM LACTATE, POTASSIUM CHLORIDE, CALCIUM CHLORIDE: 600; 310; 30; 20 INJECTION, SOLUTION INTRAVENOUS at 10:51

## 2023-01-01 RX ADMIN — Medication 3 MG: at 22:28

## 2023-01-01 RX ADMIN — Medication 50 MCG: at 08:01

## 2023-01-01 RX ADMIN — SENNOSIDES AND DOCUSATE SODIUM 1 TABLET: 8.6; 5 TABLET ORAL at 09:33

## 2023-01-01 RX ADMIN — ACETAMINOPHEN 975 MG: 325 TABLET, FILM COATED ORAL at 06:43

## 2023-01-01 RX ADMIN — DOXYCYCLINE 100 MG: 100 CAPSULE ORAL at 06:26

## 2023-01-01 RX ADMIN — ACETAMINOPHEN 325MG 975 MG: 325 TABLET ORAL at 22:11

## 2023-01-01 RX ADMIN — Medication 25 MG: at 15:58

## 2023-01-01 RX ADMIN — APIXABAN 5 MG: 2.5 TABLET, FILM COATED ORAL at 21:10

## 2023-01-01 RX ADMIN — SENNOSIDES AND DOCUSATE SODIUM 2 TABLET: 50; 8.6 TABLET ORAL at 08:59

## 2023-01-01 RX ADMIN — CARVEDILOL 6.25 MG: 6.25 TABLET, FILM COATED ORAL at 09:12

## 2023-01-01 RX ADMIN — MICONAZOLE NITRATE: 20 POWDER TOPICAL at 20:36

## 2023-01-01 RX ADMIN — CEFTAZIDIME 2 G: 2 INJECTION, POWDER, FOR SOLUTION INTRAVENOUS at 22:43

## 2023-01-01 RX ADMIN — Medication 25 MG: at 00:32

## 2023-01-01 RX ADMIN — LIRAGLUTIDE 1.8 MG: 6 INJECTION SUBCUTANEOUS at 09:20

## 2023-01-01 RX ADMIN — OXYCODONE HYDROCHLORIDE 5 MG: 5 TABLET ORAL at 06:16

## 2023-01-01 RX ADMIN — APIXABAN 5 MG: 2.5 TABLET, FILM COATED ORAL at 08:58

## 2023-01-01 RX ADMIN — APIXABAN 2.5 MG: 2.5 TABLET, FILM COATED ORAL at 09:15

## 2023-01-01 RX ADMIN — ACETAMINOPHEN 650 MG: 325 TABLET, FILM COATED ORAL at 13:07

## 2023-01-01 RX ADMIN — APIXABAN 5 MG: 2.5 TABLET, FILM COATED ORAL at 21:14

## 2023-01-01 RX ADMIN — METFORMIN ER 500 MG 2000 MG: 500 TABLET ORAL at 10:39

## 2023-01-01 RX ADMIN — FOLIC ACID 1 MG: 1 TABLET ORAL at 11:32

## 2023-01-01 RX ADMIN — HEPARIN, PORCINE (PF) 10 UNIT/ML INTRAVENOUS SYRINGE 5 ML: at 12:42

## 2023-01-01 RX ADMIN — ACETAMINOPHEN 325MG 975 MG: 325 TABLET ORAL at 06:15

## 2023-01-01 RX ADMIN — MICONAZOLE NITRATE: 20 POWDER TOPICAL at 08:41

## 2023-01-01 RX ADMIN — METFORMIN ER 500 MG 2000 MG: 500 TABLET ORAL at 10:42

## 2023-01-01 RX ADMIN — ACETAMINOPHEN 650 MG: 325 TABLET, FILM COATED ORAL at 06:16

## 2023-01-01 RX ADMIN — POLYETHYLENE GLYCOL 3350 17 G: 17 POWDER, FOR SOLUTION ORAL at 09:24

## 2023-01-01 RX ADMIN — CEFTAZIDIME 2 G: 2 INJECTION, POWDER, FOR SOLUTION INTRAVENOUS at 23:52

## 2023-01-01 RX ADMIN — MICONAZOLE NITRATE: 20 POWDER TOPICAL at 08:28

## 2023-01-01 RX ADMIN — HEPARIN, PORCINE (PF) 10 UNIT/ML INTRAVENOUS SYRINGE 5 ML: at 21:23

## 2023-01-01 RX ADMIN — DICLOFENAC SODIUM 2 G: 10 GEL TOPICAL at 12:24

## 2023-01-01 RX ADMIN — ACETAMINOPHEN 650 MG: 325 TABLET, FILM COATED ORAL at 09:48

## 2023-01-01 RX ADMIN — APIXABAN 5 MG: 2.5 TABLET, FILM COATED ORAL at 09:36

## 2023-01-01 RX ADMIN — MICONAZOLE NITRATE: 20 POWDER TOPICAL at 20:22

## 2023-01-01 RX ADMIN — ACETAMINOPHEN 325MG 975 MG: 325 TABLET ORAL at 12:29

## 2023-01-01 RX ADMIN — APIXABAN 5 MG: 2.5 TABLET, FILM COATED ORAL at 20:43

## 2023-01-01 RX ADMIN — LIDOCAINE PATCH 4% 1 PATCH: 40 PATCH TOPICAL at 18:45

## 2023-01-01 RX ADMIN — AMLODIPINE BESYLATE 10 MG: 10 TABLET ORAL at 09:01

## 2023-01-01 RX ADMIN — THIAMINE HCL TAB 100 MG 100 MG: 100 TAB at 12:19

## 2023-01-01 RX ADMIN — OXYCODONE HYDROCHLORIDE 5 MG: 5 TABLET ORAL at 19:05

## 2023-01-01 RX ADMIN — HEPARIN, PORCINE (PF) 10 UNIT/ML INTRAVENOUS SYRINGE 5 ML: at 13:14

## 2023-01-01 RX ADMIN — DICLOFENAC SODIUM 2 G: 10 GEL TOPICAL at 22:29

## 2023-01-01 RX ADMIN — METFORMIN ER 500 MG 2000 MG: 500 TABLET ORAL at 08:32

## 2023-01-01 RX ADMIN — ACETAMINOPHEN 325MG 975 MG: 325 TABLET ORAL at 22:34

## 2023-01-01 RX ADMIN — ACETAMINOPHEN 975 MG: 325 TABLET, FILM COATED ORAL at 21:08

## 2023-01-01 RX ADMIN — APIXABAN 5 MG: 2.5 TABLET, FILM COATED ORAL at 08:49

## 2023-01-01 RX ADMIN — LIDOCAINE PATCH 4% 1 PATCH: 40 PATCH TOPICAL at 16:38

## 2023-01-01 RX ADMIN — METHOCARBAMOL 500 MG: 500 TABLET ORAL at 12:18

## 2023-01-01 RX ADMIN — FERROUS SULFATE TAB 325 MG (65 MG ELEMENTAL FE) 325 MG: 325 (65 FE) TAB at 11:25

## 2023-01-01 RX ADMIN — DICLOFENAC SODIUM 2 G: 10 GEL TOPICAL at 17:41

## 2023-01-01 RX ADMIN — Medication 25 MG: at 03:51

## 2023-01-01 RX ADMIN — CIPROFLOXACIN HYDROCHLORIDE 500 MG: 500 TABLET, FILM COATED ORAL at 22:31

## 2023-01-01 RX ADMIN — APIXABAN 2.5 MG: 2.5 TABLET, FILM COATED ORAL at 09:06

## 2023-01-01 RX ADMIN — Medication 25 MG: at 09:46

## 2023-01-01 RX ADMIN — CEFTAZIDIME 2 G: 2 INJECTION, POWDER, FOR SOLUTION INTRAVENOUS at 20:05

## 2023-01-01 RX ADMIN — AMLODIPINE BESYLATE 10 MG: 10 TABLET ORAL at 12:36

## 2023-01-01 RX ADMIN — PHENYLEPHRINE HYDROCHLORIDE 100 MCG: 10 INJECTION INTRAVENOUS at 15:33

## 2023-01-01 RX ADMIN — SENNOSIDES AND DOCUSATE SODIUM 1 TABLET: 8.6; 5 TABLET ORAL at 20:07

## 2023-01-01 RX ADMIN — TRAMADOL HYDROCHLORIDE 50 MG: 50 TABLET, COATED ORAL at 20:13

## 2023-01-01 RX ADMIN — CARVEDILOL 6.25 MG: 3.12 TABLET, FILM COATED ORAL at 17:38

## 2023-01-01 RX ADMIN — ACETAMINOPHEN 650 MG: 325 TABLET, FILM COATED ORAL at 20:51

## 2023-01-01 RX ADMIN — Medication 25 MG: at 21:02

## 2023-01-01 RX ADMIN — ACETAMINOPHEN 650 MG: 325 TABLET, FILM COATED ORAL at 05:47

## 2023-01-01 RX ADMIN — ACETAMINOPHEN 325MG 975 MG: 325 TABLET ORAL at 04:31

## 2023-01-01 RX ADMIN — OXYCODONE HYDROCHLORIDE 10 MG: 5 TABLET ORAL at 08:53

## 2023-01-01 RX ADMIN — SODIUM CHLORIDE, POTASSIUM CHLORIDE, SODIUM LACTATE AND CALCIUM CHLORIDE: 600; 310; 30; 20 INJECTION, SOLUTION INTRAVENOUS at 09:00

## 2023-01-01 RX ADMIN — Medication 5 ML: at 17:36

## 2023-01-01 RX ADMIN — FOLIC ACID 1 MG: 1 TABLET ORAL at 11:02

## 2023-01-01 RX ADMIN — ALTEPLASE 2 MG: 2.2 INJECTION, POWDER, LYOPHILIZED, FOR SOLUTION INTRAVENOUS at 18:34

## 2023-01-01 RX ADMIN — ACETAMINOPHEN 1000 MG: 500 TABLET ORAL at 11:09

## 2023-01-01 RX ADMIN — SODIUM CHLORIDE, POTASSIUM CHLORIDE, SODIUM LACTATE AND CALCIUM CHLORIDE 1000 ML: 600; 310; 30; 20 INJECTION, SOLUTION INTRAVENOUS at 12:35

## 2023-01-01 RX ADMIN — SENNOSIDES AND DOCUSATE SODIUM 1 TABLET: 50; 8.6 TABLET ORAL at 21:02

## 2023-01-01 RX ADMIN — CEFTAZIDIME 2 G: 2 INJECTION, POWDER, FOR SOLUTION INTRAVENOUS at 19:54

## 2023-01-01 RX ADMIN — APIXABAN 5 MG: 5 TABLET, FILM COATED ORAL at 08:55

## 2023-01-01 RX ADMIN — DOCUSATE SODIUM 100 MG: 100 CAPSULE, LIQUID FILLED ORAL at 21:12

## 2023-01-01 RX ADMIN — AMLODIPINE BESYLATE 5 MG: 5 TABLET ORAL at 08:57

## 2023-01-01 RX ADMIN — Medication 1 CAPSULE: at 08:33

## 2023-01-01 RX ADMIN — CARVEDILOL 3.12 MG: 3.12 TABLET, FILM COATED ORAL at 11:02

## 2023-01-01 RX ADMIN — Medication 2.5 MG: at 05:44

## 2023-01-01 RX ADMIN — DOXYCYCLINE HYCLATE 100 MG: 100 CAPSULE ORAL at 19:23

## 2023-01-01 RX ADMIN — CEFTAZIDIME 2 G: 2 INJECTION, POWDER, FOR SOLUTION INTRAVENOUS at 21:34

## 2023-01-01 RX ADMIN — MULTIPLE VITAMINS W/ MINERALS TAB 1 TABLET: TAB at 12:56

## 2023-01-01 RX ADMIN — MAGNESIUM OXIDE TAB 400 MG (241.3 MG ELEMENTAL MG) 400 MG: 400 (241.3 MG) TAB at 23:29

## 2023-01-01 RX ADMIN — ACETAMINOPHEN 975 MG: 325 TABLET, FILM COATED ORAL at 22:18

## 2023-01-01 RX ADMIN — METFORMIN HYDROCHLORIDE 2000 MG: 500 TABLET, EXTENDED RELEASE ORAL at 09:56

## 2023-01-01 RX ADMIN — MICONAZOLE NITRATE: 20 POWDER TOPICAL at 09:49

## 2023-01-01 RX ADMIN — APIXABAN 2.5 MG: 2.5 TABLET, FILM COATED ORAL at 09:36

## 2023-01-01 RX ADMIN — Medication 50 MCG: at 09:22

## 2023-01-01 RX ADMIN — CIPROFLOXACIN HYDROCHLORIDE 500 MG: 500 TABLET, FILM COATED ORAL at 10:14

## 2023-01-01 RX ADMIN — LIRAGLUTIDE 1.8 MG: 6 INJECTION SUBCUTANEOUS at 08:38

## 2023-01-01 RX ADMIN — CEFTAZIDIME 2 G: 2 INJECTION, POWDER, FOR SOLUTION INTRAVENOUS at 20:56

## 2023-01-01 RX ADMIN — Medication 1 CAPSULE: at 21:42

## 2023-01-01 RX ADMIN — ATORVASTATIN CALCIUM 40 MG: 40 TABLET, FILM COATED ORAL at 10:42

## 2023-01-01 RX ADMIN — MICONAZOLE NITRATE: 20 POWDER TOPICAL at 20:05

## 2023-01-01 RX ADMIN — CARVEDILOL 6.25 MG: 6.25 TABLET, FILM COATED ORAL at 17:58

## 2023-01-01 RX ADMIN — DICLOFENAC SODIUM 2 G: 10 GEL TOPICAL at 17:54

## 2023-01-01 RX ADMIN — PIOGLITAZONE HYDROCHLORIDE 45 MG: 45 TABLET ORAL at 12:10

## 2023-01-01 RX ADMIN — LIRAGLUTIDE 1.8 MG: 6 INJECTION SUBCUTANEOUS at 09:26

## 2023-01-01 RX ADMIN — ACETAMINOPHEN 650 MG: 325 TABLET, FILM COATED ORAL at 13:42

## 2023-01-01 RX ADMIN — OXYCODONE HYDROCHLORIDE 5 MG: 5 TABLET ORAL at 14:32

## 2023-01-01 RX ADMIN — MICONAZOLE NITRATE: 20 POWDER TOPICAL at 20:17

## 2023-01-01 RX ADMIN — METHOCARBAMOL 500 MG: 500 TABLET ORAL at 15:42

## 2023-01-01 RX ADMIN — METHOCARBAMOL 500 MG: 500 TABLET ORAL at 15:39

## 2023-01-01 RX ADMIN — APIXABAN 2.5 MG: 2.5 TABLET, FILM COATED ORAL at 20:02

## 2023-01-01 RX ADMIN — Medication 50 MCG: at 09:18

## 2023-01-01 RX ADMIN — APIXABAN 2.5 MG: 2.5 TABLET, FILM COATED ORAL at 11:02

## 2023-01-01 RX ADMIN — Medication 25 MG: at 11:21

## 2023-01-01 RX ADMIN — APIXABAN 5 MG: 5 TABLET, FILM COATED ORAL at 23:14

## 2023-01-01 RX ADMIN — THIAMINE HYDROCHLORIDE 500 MG: 100 INJECTION, SOLUTION INTRAMUSCULAR; INTRAVENOUS at 08:30

## 2023-01-01 RX ADMIN — HEPARIN SODIUM 5000 UNITS: 5000 INJECTION, SOLUTION INTRAVENOUS; SUBCUTANEOUS at 22:09

## 2023-01-01 RX ADMIN — Medication 1 CAPSULE: at 19:09

## 2023-01-01 RX ADMIN — ACETAMINOPHEN 325MG 975 MG: 325 TABLET ORAL at 21:36

## 2023-01-01 RX ADMIN — CARVEDILOL 6.25 MG: 3.12 TABLET, FILM COATED ORAL at 09:08

## 2023-01-01 RX ADMIN — Medication 1 CAPSULE: at 06:07

## 2023-01-01 RX ADMIN — TRAMADOL HYDROCHLORIDE 50 MG: 50 TABLET, COATED ORAL at 14:00

## 2023-01-01 RX ADMIN — MICONAZOLE NITRATE: 20 POWDER TOPICAL at 20:48

## 2023-01-01 RX ADMIN — DOXYCYCLINE 100 MG: 100 CAPSULE ORAL at 08:31

## 2023-01-01 RX ADMIN — ACETAMINOPHEN 325MG 975 MG: 325 TABLET ORAL at 13:57

## 2023-01-01 RX ADMIN — Medication 1 CAPSULE: at 20:06

## 2023-01-01 RX ADMIN — APIXABAN 5 MG: 2.5 TABLET, FILM COATED ORAL at 09:48

## 2023-01-01 RX ADMIN — SENNOSIDES AND DOCUSATE SODIUM 1 TABLET: 8.6; 5 TABLET ORAL at 10:21

## 2023-01-01 RX ADMIN — MICONAZOLE NITRATE: 20 POWDER TOPICAL at 21:31

## 2023-01-01 RX ADMIN — ATORVASTATIN CALCIUM 40 MG: 40 TABLET, FILM COATED ORAL at 12:32

## 2023-01-01 RX ADMIN — HEPARIN, PORCINE (PF) 10 UNIT/ML INTRAVENOUS SYRINGE 5 ML: at 06:02

## 2023-01-01 RX ADMIN — METFORMIN ER 500 MG 2000 MG: 500 TABLET ORAL at 09:39

## 2023-01-01 RX ADMIN — APIXABAN 2.5 MG: 2.5 TABLET, FILM COATED ORAL at 09:07

## 2023-01-01 RX ADMIN — Medication 5 ML: at 15:59

## 2023-01-01 RX ADMIN — APIXABAN 5 MG: 5 TABLET, FILM COATED ORAL at 21:16

## 2023-01-01 RX ADMIN — MULTIPLE VITAMINS W/ MINERALS TAB 1 TABLET: TAB at 09:32

## 2023-01-01 RX ADMIN — ACETAMINOPHEN 325MG 975 MG: 325 TABLET ORAL at 11:02

## 2023-01-01 RX ADMIN — ATORVASTATIN CALCIUM 40 MG: 40 TABLET, FILM COATED ORAL at 13:30

## 2023-01-01 RX ADMIN — AMLODIPINE BESYLATE 10 MG: 10 TABLET ORAL at 13:17

## 2023-01-01 RX ADMIN — METHOCARBAMOL 500 MG: 500 TABLET ORAL at 09:02

## 2023-01-01 RX ADMIN — METHOCARBAMOL 500 MG: 500 TABLET ORAL at 22:43

## 2023-01-01 RX ADMIN — FERROUS SULFATE TAB 325 MG (65 MG ELEMENTAL FE) 325 MG: 325 (65 FE) TAB at 12:17

## 2023-01-01 RX ADMIN — APIXABAN 5 MG: 2.5 TABLET, FILM COATED ORAL at 11:37

## 2023-01-01 RX ADMIN — Medication 2 G: at 20:20

## 2023-01-01 RX ADMIN — ACETAMINOPHEN 325MG 975 MG: 325 TABLET ORAL at 03:47

## 2023-01-01 RX ADMIN — DICLOFENAC SODIUM 2 G: 10 GEL TOPICAL at 22:26

## 2023-01-01 RX ADMIN — SENNOSIDES AND DOCUSATE SODIUM 1 TABLET: 8.6; 5 TABLET ORAL at 09:24

## 2023-01-01 RX ADMIN — PIOGLITAZONE HYDROCHLORIDE 45 MG: 45 TABLET ORAL at 10:50

## 2023-01-01 RX ADMIN — MICONAZOLE NITRATE: 20 POWDER TOPICAL at 19:45

## 2023-01-01 RX ADMIN — SENNOSIDES AND DOCUSATE SODIUM 1 TABLET: 50; 8.6 TABLET ORAL at 20:12

## 2023-01-01 RX ADMIN — CIPROFLOXACIN HYDROCHLORIDE 500 MG: 500 TABLET, FILM COATED ORAL at 09:25

## 2023-01-01 RX ADMIN — DOXYCYCLINE HYCLATE 100 MG: 100 CAPSULE ORAL at 19:45

## 2023-01-01 RX ADMIN — Medication 50 MG: at 13:30

## 2023-01-01 RX ADMIN — PIOGLITAZONE HYDROCHLORIDE 45 MG: 45 TABLET ORAL at 08:29

## 2023-01-01 RX ADMIN — LIRAGLUTIDE 1.8 MG: 6 INJECTION SUBCUTANEOUS at 10:25

## 2023-01-01 RX ADMIN — LIRAGLUTIDE 1.8 MG: 6 INJECTION SUBCUTANEOUS at 10:18

## 2023-01-01 RX ADMIN — OXYCODONE HYDROCHLORIDE 5 MG: 5 TABLET ORAL at 02:59

## 2023-01-01 RX ADMIN — OXYCODONE HYDROCHLORIDE 5 MG: 5 TABLET ORAL at 20:55

## 2023-01-01 RX ADMIN — SENNOSIDES AND DOCUSATE SODIUM 1 TABLET: 50; 8.6 TABLET ORAL at 21:57

## 2023-01-01 RX ADMIN — VANCOMYCIN HYDROCHLORIDE 500 MG: 500 INJECTION, POWDER, LYOPHILIZED, FOR SOLUTION INTRAVENOUS at 15:39

## 2023-01-01 ASSESSMENT — ACTIVITIES OF DAILY LIVING (ADL)
ADLS_ACUITY_SCORE: 58
ADLS_ACUITY_SCORE: 42
ADLS_ACUITY_SCORE: 60
ADLS_ACUITY_SCORE: 62
ADLS_ACUITY_SCORE: 45
ADLS_ACUITY_SCORE: 45
ADLS_ACUITY_SCORE: 48
ADLS_ACUITY_SCORE: 47
ADLS_ACUITY_SCORE: 47
TOILETING_MANAGEMENT: A OF 2
ADLS_ACUITY_SCORE: 44
ADLS_ACUITY_SCORE: 43
ADLS_ACUITY_SCORE: 47
ADLS_ACUITY_SCORE: 44
ADLS_ACUITY_SCORE: 47
ADLS_ACUITY_SCORE: 46
ADLS_ACUITY_SCORE: 56
ADLS_ACUITY_SCORE: 45
ADLS_ACUITY_SCORE: 46
ADLS_ACUITY_SCORE: 56
ADLS_ACUITY_SCORE: 64
ADLS_ACUITY_SCORE: 43
ADLS_ACUITY_SCORE: 46
ADLS_ACUITY_SCORE: 60
ADLS_ACUITY_SCORE: 54
ADLS_ACUITY_SCORE: 46
ADLS_ACUITY_SCORE: 49
ADLS_ACUITY_SCORE: 43
ADLS_ACUITY_SCORE: 49
ADLS_ACUITY_SCORE: 48
ADLS_ACUITY_SCORE: 58
ADLS_ACUITY_SCORE: 45
ADLS_ACUITY_SCORE: 46
ADLS_ACUITY_SCORE: 49
ADLS_ACUITY_SCORE: 47
ADLS_ACUITY_SCORE: 60
ADLS_ACUITY_SCORE: 56
ADLS_ACUITY_SCORE: 42
ADLS_ACUITY_SCORE: 53
ADLS_ACUITY_SCORE: 54
ADLS_ACUITY_SCORE: 44
ADLS_ACUITY_SCORE: 43
ADLS_ACUITY_SCORE: 46
ADLS_ACUITY_SCORE: 45
ADLS_ACUITY_SCORE: 46
ADLS_ACUITY_SCORE: 54
ADLS_ACUITY_SCORE: 41
ADLS_ACUITY_SCORE: 46
TOILETING_ASSISTANCE: TOILETING DIFFICULTY, ASSISTANCE 1 PERSON;TOILETING DIFFICULTY, REQUIRES EQUIPMENT
ADLS_ACUITY_SCORE: 42
ADLS_ACUITY_SCORE: 45
ADLS_ACUITY_SCORE: 46
ADLS_ACUITY_SCORE: 46
ADLS_ACUITY_SCORE: 49
ADLS_ACUITY_SCORE: 46
ADLS_ACUITY_SCORE: 44
ADLS_ACUITY_SCORE: 43
ADLS_ACUITY_SCORE: 46
ADLS_ACUITY_SCORE: 47
ADLS_ACUITY_SCORE: 42
ADLS_ACUITY_SCORE: 49
ADLS_ACUITY_SCORE: 59
ADLS_ACUITY_SCORE: 54
ADLS_ACUITY_SCORE: 47
ADLS_ACUITY_SCORE: 47
ADLS_ACUITY_SCORE: 56
ADLS_ACUITY_SCORE: 43
ADLS_ACUITY_SCORE: 49
ADLS_ACUITY_SCORE: 42
ADLS_ACUITY_SCORE: 52
ADLS_ACUITY_SCORE: 44
ADLS_ACUITY_SCORE: 56
ADLS_ACUITY_SCORE: 43
ADLS_ACUITY_SCORE: 46
ADLS_ACUITY_SCORE: 48
ADLS_ACUITY_SCORE: 50
ADLS_ACUITY_SCORE: 45
ADLS_ACUITY_SCORE: 44
ADLS_ACUITY_SCORE: 42
ADLS_ACUITY_SCORE: 43
ADLS_ACUITY_SCORE: 47
ADLS_ACUITY_SCORE: 43
FALL_HISTORY_WITHIN_LAST_SIX_MONTHS: NO
ADLS_ACUITY_SCORE: 43
ADLS_ACUITY_SCORE: 43
ADLS_ACUITY_SCORE: 44
ADLS_ACUITY_SCORE: 45
ADLS_ACUITY_SCORE: 52
ADLS_ACUITY_SCORE: 54
ADLS_ACUITY_SCORE: 60
ADLS_ACUITY_SCORE: 56
ADLS_ACUITY_SCORE: 44
ADLS_ACUITY_SCORE: 46
ADLS_ACUITY_SCORE: 60
ADLS_ACUITY_SCORE: 46
ADLS_ACUITY_SCORE: 42
ADLS_ACUITY_SCORE: 56
ADLS_ACUITY_SCORE: 44
ADLS_ACUITY_SCORE: 60
ADLS_ACUITY_SCORE: 45
ADLS_ACUITY_SCORE: 45
ADLS_ACUITY_SCORE: 43
ADLS_ACUITY_SCORE: 57
ADLS_ACUITY_SCORE: 43
ADLS_ACUITY_SCORE: 49
ADLS_ACUITY_SCORE: 43
ADLS_ACUITY_SCORE: 46
ADLS_ACUITY_SCORE: 44
ADLS_ACUITY_SCORE: 44
ADLS_ACUITY_SCORE: 46
ADLS_ACUITY_SCORE: 60
ADLS_ACUITY_SCORE: 43
ADLS_ACUITY_SCORE: 64
ADLS_ACUITY_SCORE: 53
ADLS_ACUITY_SCORE: 42
ADLS_ACUITY_SCORE: 42
ADLS_ACUITY_SCORE: 46
ADLS_ACUITY_SCORE: 45
ADLS_ACUITY_SCORE: 42
ADLS_ACUITY_SCORE: 45
ADLS_ACUITY_SCORE: 55
ADLS_ACUITY_SCORE: 45
ADLS_ACUITY_SCORE: 47
ADLS_ACUITY_SCORE: 43
ADLS_ACUITY_SCORE: 48
ADLS_ACUITY_SCORE: 47
ADLS_ACUITY_SCORE: 42
ADLS_ACUITY_SCORE: 60
ADLS_ACUITY_SCORE: 46
ADLS_ACUITY_SCORE: 46
ADLS_ACUITY_SCORE: 47
ADLS_ACUITY_SCORE: 51
ADLS_ACUITY_SCORE: 45
ADLS_ACUITY_SCORE: 42
ADLS_ACUITY_SCORE: 49
ADLS_ACUITY_SCORE: 60
ADLS_ACUITY_SCORE: 42
ADLS_ACUITY_SCORE: 43
ADLS_ACUITY_SCORE: 43
ADLS_ACUITY_SCORE: 54
ADLS_ACUITY_SCORE: 43
ADLS_ACUITY_SCORE: 43
ADLS_ACUITY_SCORE: 49
ADLS_ACUITY_SCORE: 42
ADLS_ACUITY_SCORE: 51
ADLS_ACUITY_SCORE: 47
ADLS_ACUITY_SCORE: 58
ADLS_ACUITY_SCORE: 48
ADLS_ACUITY_SCORE: 46
ADLS_ACUITY_SCORE: 44
ADLS_ACUITY_SCORE: 58
ADLS_ACUITY_SCORE: 55
ADLS_ACUITY_SCORE: 47
ADLS_ACUITY_SCORE: 43
ADLS_ACUITY_SCORE: 55
ADLS_ACUITY_SCORE: 44
ADLS_ACUITY_SCORE: 49
ADLS_ACUITY_SCORE: 42
ADLS_ACUITY_SCORE: 44
ADLS_ACUITY_SCORE: 54
ADLS_ACUITY_SCORE: 44
ADLS_ACUITY_SCORE: 49
ADLS_ACUITY_SCORE: 57
ADLS_ACUITY_SCORE: 42
ADLS_ACUITY_SCORE: 54
ADLS_ACUITY_SCORE: 46
ADLS_ACUITY_SCORE: 45
ADLS_ACUITY_SCORE: 42
ADLS_ACUITY_SCORE: 47
ADLS_ACUITY_SCORE: 56
ADLS_ACUITY_SCORE: 48
ADLS_ACUITY_SCORE: 44
ADLS_ACUITY_SCORE: 53
ADLS_ACUITY_SCORE: 55
ADLS_ACUITY_SCORE: 56
ADLS_ACUITY_SCORE: 45
ADLS_ACUITY_SCORE: 55
ADLS_ACUITY_SCORE: 42
ADLS_ACUITY_SCORE: 64
ADLS_ACUITY_SCORE: 46
ADLS_ACUITY_SCORE: 56
ADLS_ACUITY_SCORE: 47
ADLS_ACUITY_SCORE: 42
ADLS_ACUITY_SCORE: 46
ADLS_ACUITY_SCORE: 42
ADLS_ACUITY_SCORE: 42
ADLS_ACUITY_SCORE: 45
ADLS_ACUITY_SCORE: 53
ADLS_ACUITY_SCORE: 42
ADLS_ACUITY_SCORE: 46
ADLS_ACUITY_SCORE: 45
ADLS_ACUITY_SCORE: 60
ADLS_ACUITY_SCORE: 49
ADLS_ACUITY_SCORE: 47
ADLS_ACUITY_SCORE: 47
ADLS_ACUITY_SCORE: 34
ADLS_ACUITY_SCORE: 44
ADLS_ACUITY_SCORE: 46
ADLS_ACUITY_SCORE: 42
ADLS_ACUITY_SCORE: 46
ADLS_ACUITY_SCORE: 54
ADLS_ACUITY_SCORE: 44
ADLS_ACUITY_SCORE: 46
ADLS_ACUITY_SCORE: 48
ADLS_ACUITY_SCORE: 45
ADLS_ACUITY_SCORE: 42
ADLS_ACUITY_SCORE: 43
ADLS_ACUITY_SCORE: 53
ADLS_ACUITY_SCORE: 45
ADLS_ACUITY_SCORE: 42
ADLS_ACUITY_SCORE: 60
ADLS_ACUITY_SCORE: 46
ADLS_ACUITY_SCORE: 46
ADLS_ACUITY_SCORE: 43
ADLS_ACUITY_SCORE: 43
ADLS_ACUITY_SCORE: 50
ADLS_ACUITY_SCORE: 51
ADLS_ACUITY_SCORE: 45
ADLS_ACUITY_SCORE: 45
ADLS_ACUITY_SCORE: 42
ADLS_ACUITY_SCORE: 45
ADLS_ACUITY_SCORE: 64
ADLS_ACUITY_SCORE: 47
ADLS_ACUITY_SCORE: 65
ADLS_ACUITY_SCORE: 52
ADLS_ACUITY_SCORE: 60
ADLS_ACUITY_SCORE: 59
ADLS_ACUITY_SCORE: 60
ADLS_ACUITY_SCORE: 43
ADLS_ACUITY_SCORE: 49
ADLS_ACUITY_SCORE: 46
ADLS_ACUITY_SCORE: 65
ADLS_ACUITY_SCORE: 60
ADLS_ACUITY_SCORE: 46
ADLS_ACUITY_SCORE: 52
ADLS_ACUITY_SCORE: 62
ADLS_ACUITY_SCORE: 65
ADLS_ACUITY_SCORE: 53
ADLS_ACUITY_SCORE: 45
ADLS_ACUITY_SCORE: 45
ADLS_ACUITY_SCORE: 43
ADLS_ACUITY_SCORE: 43
ADLS_ACUITY_SCORE: 62
ADLS_ACUITY_SCORE: 59
ADLS_ACUITY_SCORE: 44
ADLS_ACUITY_SCORE: 45
ADLS_ACUITY_SCORE: 57
ADLS_ACUITY_SCORE: 46
ADLS_ACUITY_SCORE: 50
ADLS_ACUITY_SCORE: 44
ADLS_ACUITY_SCORE: 42
ADLS_ACUITY_SCORE: 43
ADLS_ACUITY_SCORE: 55
ADLS_ACUITY_SCORE: 42
ADLS_ACUITY_SCORE: 45
ADLS_ACUITY_SCORE: 57
ADLS_ACUITY_SCORE: 60
ADLS_ACUITY_SCORE: 57
ADLS_ACUITY_SCORE: 46
ADLS_ACUITY_SCORE: 49
ADLS_ACUITY_SCORE: 47
ADLS_ACUITY_SCORE: 47
ADLS_ACUITY_SCORE: 57
ADLS_ACUITY_SCORE: 47
ADLS_ACUITY_SCORE: 44
ADLS_ACUITY_SCORE: 49
ADLS_ACUITY_SCORE: 45
ADLS_ACUITY_SCORE: 41
ADLS_ACUITY_SCORE: 55
ADLS_ACUITY_SCORE: 47
ADLS_ACUITY_SCORE: 46
ADLS_ACUITY_SCORE: 45
ADLS_ACUITY_SCORE: 47
ADLS_ACUITY_SCORE: 44
ADLS_ACUITY_SCORE: 46
ADLS_ACUITY_SCORE: 49
ADLS_ACUITY_SCORE: 45
ADLS_ACUITY_SCORE: 43
ADLS_ACUITY_SCORE: 60
ADLS_ACUITY_SCORE: 45
ADLS_ACUITY_SCORE: 48
ADLS_ACUITY_SCORE: 54
ADLS_ACUITY_SCORE: 46
ADLS_ACUITY_SCORE: 42
ADLS_ACUITY_SCORE: 45
ADLS_ACUITY_SCORE: 44
ADLS_ACUITY_SCORE: 62
ADLS_ACUITY_SCORE: 34
ADLS_ACUITY_SCORE: 48
ADLS_ACUITY_SCORE: 49
ADLS_ACUITY_SCORE: 60
ADLS_ACUITY_SCORE: 45
ADLS_ACUITY_SCORE: 42
ADLS_ACUITY_SCORE: 54
ADLS_ACUITY_SCORE: 64
ADLS_ACUITY_SCORE: 46
ADLS_ACUITY_SCORE: 59
ADLS_ACUITY_SCORE: 54
ADLS_ACUITY_SCORE: 65
ADLS_ACUITY_SCORE: 56
ADLS_ACUITY_SCORE: 62
ADLS_ACUITY_SCORE: 55
ADLS_ACUITY_SCORE: 44
ADLS_ACUITY_SCORE: 37
ADLS_ACUITY_SCORE: 47
ADLS_ACUITY_SCORE: 63
ADLS_ACUITY_SCORE: 45
ADLS_ACUITY_SCORE: 42
ADLS_ACUITY_SCORE: 46
ADLS_ACUITY_SCORE: 43
ADLS_ACUITY_SCORE: 46
ADLS_ACUITY_SCORE: 50
ADLS_ACUITY_SCORE: 46
ADLS_ACUITY_SCORE: 37
ADLS_ACUITY_SCORE: 46
ADLS_ACUITY_SCORE: 44
TRANSFERRING: 1-->ASSISTANCE (EQUIPMENT/PERSON) NEEDED
ADLS_ACUITY_SCORE: 46
ADLS_ACUITY_SCORE: 60
ADLS_ACUITY_SCORE: 47
ADLS_ACUITY_SCORE: 62
ADLS_ACUITY_SCORE: 41
ADLS_ACUITY_SCORE: 54
ADLS_ACUITY_SCORE: 46
ADLS_ACUITY_SCORE: 49
ADLS_ACUITY_SCORE: 47
ADLS_ACUITY_SCORE: 45
ADLS_ACUITY_SCORE: 43
ADLS_ACUITY_SCORE: 42
ADLS_ACUITY_SCORE: 36
ADLS_ACUITY_SCORE: 45
ADLS_ACUITY_SCORE: 41
ADLS_ACUITY_SCORE: 49
ADLS_ACUITY_SCORE: 41
ADLS_ACUITY_SCORE: 49
TOILETING_ISSUES: NO
ADLS_ACUITY_SCORE: 46
ADLS_ACUITY_SCORE: 51
ADLS_ACUITY_SCORE: 54
ADLS_ACUITY_SCORE: 57
ADLS_ACUITY_SCORE: 46
DRESS: 1-->ASSISTANCE (EQUIPMENT/PERSON) NEEDED (NOT DEVELOPMENTALLY APPROPRIATE)
ADLS_ACUITY_SCORE: 43
ADLS_ACUITY_SCORE: 56
ADLS_ACUITY_SCORE: 42
ADLS_ACUITY_SCORE: 44
ADLS_ACUITY_SCORE: 46
ADLS_ACUITY_SCORE: 47
ADLS_ACUITY_SCORE: 42
ADLS_ACUITY_SCORE: 55
ADLS_ACUITY_SCORE: 37
ADLS_ACUITY_SCORE: 48
FALL_HISTORY_WITHIN_LAST_SIX_MONTHS: NO
ADLS_ACUITY_SCORE: 44
ADLS_ACUITY_SCORE: 56
ADLS_ACUITY_SCORE: 42
ADLS_ACUITY_SCORE: 43
DRESSING/BATHING_DIFFICULTY: YES
ADLS_ACUITY_SCORE: 44
ADLS_ACUITY_SCORE: 44
ADLS_ACUITY_SCORE: 47
ADLS_ACUITY_SCORE: 60
ADLS_ACUITY_SCORE: 60
ADLS_ACUITY_SCORE: 53
ADLS_ACUITY_SCORE: 58
ADLS_ACUITY_SCORE: 46
ADLS_ACUITY_SCORE: 44
ADLS_ACUITY_SCORE: 65
ADLS_ACUITY_SCORE: 42
ADLS_ACUITY_SCORE: 47
ADLS_ACUITY_SCORE: 56
ADLS_ACUITY_SCORE: 45
ADLS_ACUITY_SCORE: 43
ADLS_ACUITY_SCORE: 62
ADLS_ACUITY_SCORE: 52
ADLS_ACUITY_SCORE: 43
ADLS_ACUITY_SCORE: 47
ADLS_ACUITY_SCORE: 43
ADLS_ACUITY_SCORE: 47
ADLS_ACUITY_SCORE: 56
ADLS_ACUITY_SCORE: 60
ADLS_ACUITY_SCORE: 56
ADLS_ACUITY_SCORE: 42
ADLS_ACUITY_SCORE: 62
ADLS_ACUITY_SCORE: 56
ADLS_ACUITY_SCORE: 49
ADLS_ACUITY_SCORE: 44
ADLS_ACUITY_SCORE: 58
ADLS_ACUITY_SCORE: 42
ADLS_ACUITY_SCORE: 43
ADLS_ACUITY_SCORE: 46
ADLS_ACUITY_SCORE: 44
ADLS_ACUITY_SCORE: 45
ADLS_ACUITY_SCORE: 43
ADLS_ACUITY_SCORE: 53
ADLS_ACUITY_SCORE: 49
ADLS_ACUITY_SCORE: 43
ADLS_ACUITY_SCORE: 45
ADLS_ACUITY_SCORE: 44
ADLS_ACUITY_SCORE: 35
ADLS_ACUITY_SCORE: 51
ADLS_ACUITY_SCORE: 50
ADLS_ACUITY_SCORE: 42
ADLS_ACUITY_SCORE: 43
ADLS_ACUITY_SCORE: 56
ADLS_ACUITY_SCORE: 56
ADLS_ACUITY_SCORE: 65
ADLS_ACUITY_SCORE: 45
ADLS_ACUITY_SCORE: 56
ADLS_ACUITY_SCORE: 53
ADLS_ACUITY_SCORE: 43
ADLS_ACUITY_SCORE: 55
ADLS_ACUITY_SCORE: 55
ADLS_ACUITY_SCORE: 49
WALKING_OR_CLIMBING_STAIRS: AMBULATION DIFFICULTY, REQUIRES EQUIPMENT;STAIR CLIMBING DIFFICULTY, ASSISTANCE 1 PERSON;TRANSFERRING DIFFICULTY, ASSISTANCE 1 PERSON
ADLS_ACUITY_SCORE: 56
ADLS_ACUITY_SCORE: 49
ADLS_ACUITY_SCORE: 62
ADLS_ACUITY_SCORE: 49
ADLS_ACUITY_SCORE: 48
ADLS_ACUITY_SCORE: 43
ADLS_ACUITY_SCORE: 50
ADLS_ACUITY_SCORE: 49
ADLS_ACUITY_SCORE: 58
ADLS_ACUITY_SCORE: 46
ADLS_ACUITY_SCORE: 51
ADLS_ACUITY_SCORE: 46
ADLS_ACUITY_SCORE: 46
ADLS_ACUITY_SCORE: 43
ADLS_ACUITY_SCORE: 47
ADLS_ACUITY_SCORE: 59
ADLS_ACUITY_SCORE: 60
ADLS_ACUITY_SCORE: 64
ADLS_ACUITY_SCORE: 42
ADLS_ACUITY_SCORE: 46
ADLS_ACUITY_SCORE: 35
ADLS_ACUITY_SCORE: 54
ADLS_ACUITY_SCORE: 42
ADLS_ACUITY_SCORE: 62
ADLS_ACUITY_SCORE: 44
ADLS_ACUITY_SCORE: 47
ADLS_ACUITY_SCORE: 54
ADLS_ACUITY_SCORE: 49
ADLS_ACUITY_SCORE: 43
ADLS_ACUITY_SCORE: 43
ADLS_ACUITY_SCORE: 48
ADLS_ACUITY_SCORE: 46
ADLS_ACUITY_SCORE: 46
ADLS_ACUITY_SCORE: 42
ADLS_ACUITY_SCORE: 58
ADLS_ACUITY_SCORE: 47
ADLS_ACUITY_SCORE: 47
ADLS_ACUITY_SCORE: 62
ADLS_ACUITY_SCORE: 42
ADLS_ACUITY_SCORE: 45
ADLS_ACUITY_SCORE: 44
ADLS_ACUITY_SCORE: 52
ADLS_ACUITY_SCORE: 62
ADLS_ACUITY_SCORE: 47
ADLS_ACUITY_SCORE: 42
ADLS_ACUITY_SCORE: 47
TRANSFERRING: 1-->ASSISTANCE (EQUIPMENT/PERSON) NEEDED (NOT DEVELOPMENTALLY APPROPRIATE)
ADLS_ACUITY_SCORE: 45
ADLS_ACUITY_SCORE: 58
ADLS_ACUITY_SCORE: 43
DRESSING/BATHING: BATHING DIFFICULTY, ASSISTANCE 1 PERSON
ADLS_ACUITY_SCORE: 57
ADLS_ACUITY_SCORE: 45
ADLS_ACUITY_SCORE: 58
ADLS_ACUITY_SCORE: 62
ADLS_ACUITY_SCORE: 43
ADLS_ACUITY_SCORE: 64
ADLS_ACUITY_SCORE: 43
ADLS_ACUITY_SCORE: 46
ADLS_ACUITY_SCORE: 47
DIFFICULTY_EATING/SWALLOWING: NO
TRANSFERRING: 1-->ASSISTANCE (EQUIPMENT/PERSON) NEEDED
ADLS_ACUITY_SCORE: 41
ADLS_ACUITY_SCORE: 42
ADLS_ACUITY_SCORE: 42
ADLS_ACUITY_SCORE: 54
ADLS_ACUITY_SCORE: 44
ADLS_ACUITY_SCORE: 56
ADLS_ACUITY_SCORE: 43
ADLS_ACUITY_SCORE: 42
ADLS_ACUITY_SCORE: 62
ADLS_ACUITY_SCORE: 58
ADLS_ACUITY_SCORE: 46
ADLS_ACUITY_SCORE: 42
ADLS_ACUITY_SCORE: 60
ADLS_ACUITY_SCORE: 49
ADLS_ACUITY_SCORE: 45
ADLS_ACUITY_SCORE: 46
ADLS_ACUITY_SCORE: 46
ADLS_ACUITY_SCORE: 49
ADLS_ACUITY_SCORE: 42
ADLS_ACUITY_SCORE: 56
ADLS_ACUITY_SCORE: 49
ADLS_ACUITY_SCORE: 44
ADLS_ACUITY_SCORE: 53
ADLS_ACUITY_SCORE: 55
ADLS_ACUITY_SCORE: 42
ADLS_ACUITY_SCORE: 46
ADLS_ACUITY_SCORE: 55
ADLS_ACUITY_SCORE: 42
ADLS_ACUITY_SCORE: 45
ADLS_ACUITY_SCORE: 46
ADLS_ACUITY_SCORE: 54
ADLS_ACUITY_SCORE: 56
ADLS_ACUITY_SCORE: 42
ADLS_ACUITY_SCORE: 46
ADLS_ACUITY_SCORE: 51
ADLS_ACUITY_SCORE: 43
ADLS_ACUITY_SCORE: 42
ADLS_ACUITY_SCORE: 46
ADLS_ACUITY_SCORE: 56
ADLS_ACUITY_SCORE: 45
ADLS_ACUITY_SCORE: 45
ADLS_ACUITY_SCORE: 47
ADLS_ACUITY_SCORE: 45
ADLS_ACUITY_SCORE: 45
ADLS_ACUITY_SCORE: 46
ADLS_ACUITY_SCORE: 62
ADLS_ACUITY_SCORE: 45
ADLS_ACUITY_SCORE: 57
ADLS_ACUITY_SCORE: 44
ADLS_ACUITY_SCORE: 49
ADLS_ACUITY_SCORE: 52
ADLS_ACUITY_SCORE: 50
ADLS_ACUITY_SCORE: 44
ADLS_ACUITY_SCORE: 58
ADLS_ACUITY_SCORE: 55
ADLS_ACUITY_SCORE: 54
ADLS_ACUITY_SCORE: 60
ADLS_ACUITY_SCORE: 43
ADLS_ACUITY_SCORE: 43
ADLS_ACUITY_SCORE: 44
BATHING: 1-->ASSISTANCE NEEDED
ADLS_ACUITY_SCORE: 45
ADLS_ACUITY_SCORE: 46
ADLS_ACUITY_SCORE: 51
ADLS_ACUITY_SCORE: 60
ADLS_ACUITY_SCORE: 58
ADLS_ACUITY_SCORE: 47
TOILETING_ASSISTANCE: TOILETING DIFFICULTY, ASSISTANCE 1 PERSON
ADLS_ACUITY_SCORE: 46
ADLS_ACUITY_SCORE: 47
ADLS_ACUITY_SCORE: 42
ADLS_ACUITY_SCORE: 56
ADLS_ACUITY_SCORE: 46
ADLS_ACUITY_SCORE: 46
ADLS_ACUITY_SCORE: 45
ADLS_ACUITY_SCORE: 54
ADLS_ACUITY_SCORE: 43
ADLS_ACUITY_SCORE: 64
ADLS_ACUITY_SCORE: 54
ADLS_ACUITY_SCORE: 60
ADLS_ACUITY_SCORE: 44
ADLS_ACUITY_SCORE: 47
ADLS_ACUITY_SCORE: 60
ADLS_ACUITY_SCORE: 62
ADLS_ACUITY_SCORE: 42
ADLS_ACUITY_SCORE: 44
ADLS_ACUITY_SCORE: 54
ADLS_ACUITY_SCORE: 43
ADLS_ACUITY_SCORE: 43
ADLS_ACUITY_SCORE: 45
ADLS_ACUITY_SCORE: 42
ADLS_ACUITY_SCORE: 52
ADLS_ACUITY_SCORE: 46
ADLS_ACUITY_SCORE: 47
ADLS_ACUITY_SCORE: 43
ADLS_ACUITY_SCORE: 62
ADLS_ACUITY_SCORE: 56
ADLS_ACUITY_SCORE: 43
WALKING_OR_CLIMBING_STAIRS_DIFFICULTY: YES
ADLS_ACUITY_SCORE: 44
ADLS_ACUITY_SCORE: 60
ADLS_ACUITY_SCORE: 43
ADLS_ACUITY_SCORE: 43
ADLS_ACUITY_SCORE: 54
ADLS_ACUITY_SCORE: 42
ADLS_ACUITY_SCORE: 62
ADLS_ACUITY_SCORE: 43
ADLS_ACUITY_SCORE: 47
ADLS_ACUITY_SCORE: 49
ADLS_ACUITY_SCORE: 42
ADLS_ACUITY_SCORE: 47
ADLS_ACUITY_SCORE: 44
ADLS_ACUITY_SCORE: 51
ADLS_ACUITY_SCORE: 45
ADLS_ACUITY_SCORE: 48
ADLS_ACUITY_SCORE: 45
ADLS_ACUITY_SCORE: 46
ADLS_ACUITY_SCORE: 56
ADLS_ACUITY_SCORE: 49
ADLS_ACUITY_SCORE: 60
ADLS_ACUITY_SCORE: 44
ADLS_ACUITY_SCORE: 42
ADLS_ACUITY_SCORE: 47
ADLS_ACUITY_SCORE: 47
ADLS_ACUITY_SCORE: 42
ADLS_ACUITY_SCORE: 49
ADLS_ACUITY_SCORE: 59
ADLS_ACUITY_SCORE: 52
ADLS_ACUITY_SCORE: 41
ADLS_ACUITY_SCORE: 54
ADLS_ACUITY_SCORE: 58
ADLS_ACUITY_SCORE: 43
TRANSFERRING: 1-->ASSISTANCE (EQUIPMENT/PERSON) NEEDED (NOT DEVELOPMENTALLY APPROPRIATE)
ADLS_ACUITY_SCORE: 44
ADLS_ACUITY_SCORE: 44
ADLS_ACUITY_SCORE: 46
ADLS_ACUITY_SCORE: 35
ADLS_ACUITY_SCORE: 53
ADLS_ACUITY_SCORE: 59
ADLS_ACUITY_SCORE: 60
ADLS_ACUITY_SCORE: 42
ADLS_ACUITY_SCORE: 46
ADLS_ACUITY_SCORE: 44
ADLS_ACUITY_SCORE: 46
ADLS_ACUITY_SCORE: 47
ADLS_ACUITY_SCORE: 45
ADLS_ACUITY_SCORE: 44
ADLS_ACUITY_SCORE: 51
ADLS_ACUITY_SCORE: 52
ADLS_ACUITY_SCORE: 46
ADLS_ACUITY_SCORE: 46
ADLS_ACUITY_SCORE: 43
ADLS_ACUITY_SCORE: 42
ADLS_ACUITY_SCORE: 51
ADLS_ACUITY_SCORE: 65
ADLS_ACUITY_SCORE: 55
ADLS_ACUITY_SCORE: 64
ADLS_ACUITY_SCORE: 53
ADLS_ACUITY_SCORE: 59
ADLS_ACUITY_SCORE: 46
ADLS_ACUITY_SCORE: 42
ADLS_ACUITY_SCORE: 50
ADLS_ACUITY_SCORE: 54
ADLS_ACUITY_SCORE: 47
ADLS_ACUITY_SCORE: 60
ADLS_ACUITY_SCORE: 46
ADLS_ACUITY_SCORE: 55
ADLS_ACUITY_SCORE: 46
ADLS_ACUITY_SCORE: 42
ADLS_ACUITY_SCORE: 44
ADLS_ACUITY_SCORE: 49
ADLS_ACUITY_SCORE: 62
ADLS_ACUITY_SCORE: 46
ADLS_ACUITY_SCORE: 60
ADLS_ACUITY_SCORE: 35
DIFFICULTY_COMMUNICATING: NO
ADLS_ACUITY_SCORE: 44
ADLS_ACUITY_SCORE: 43
ADLS_ACUITY_SCORE: 43
ADLS_ACUITY_SCORE: 42
ADLS_ACUITY_SCORE: 43
ADLS_ACUITY_SCORE: 48
ADLS_ACUITY_SCORE: 44
ADLS_ACUITY_SCORE: 54
ADLS_ACUITY_SCORE: 62
ADLS_ACUITY_SCORE: 55
TOILETING: 1-->ASSISTANCE (EQUIPMENT/PERSON) NEEDED (NOT DEVELOPMENTALLY APPROPRIATE)
ADLS_ACUITY_SCORE: 43
ADLS_ACUITY_SCORE: 59
ADLS_ACUITY_SCORE: 41
ADLS_ACUITY_SCORE: 62
ADLS_ACUITY_SCORE: 44
ADLS_ACUITY_SCORE: 43
ADLS_ACUITY_SCORE: 46
ADLS_ACUITY_SCORE: 44
ADLS_ACUITY_SCORE: 49
ADLS_ACUITY_SCORE: 45
ADLS_ACUITY_SCORE: 46
ADLS_ACUITY_SCORE: 62
ADLS_ACUITY_SCORE: 42
ADLS_ACUITY_SCORE: 47
ADLS_ACUITY_SCORE: 45
ADLS_ACUITY_SCORE: 64
ADLS_ACUITY_SCORE: 42
ADLS_ACUITY_SCORE: 46
ADLS_ACUITY_SCORE: 47
ADLS_ACUITY_SCORE: 54
ADLS_ACUITY_SCORE: 46
ADLS_ACUITY_SCORE: 46
ADLS_ACUITY_SCORE: 59
ADLS_ACUITY_SCORE: 49
ADLS_ACUITY_SCORE: 48
ADLS_ACUITY_SCORE: 42
ADLS_ACUITY_SCORE: 56
ADLS_ACUITY_SCORE: 54
ADLS_ACUITY_SCORE: 46
ADLS_ACUITY_SCORE: 58
ADLS_ACUITY_SCORE: 55
ADLS_ACUITY_SCORE: 53
ADLS_ACUITY_SCORE: 42
ADLS_ACUITY_SCORE: 51
ADLS_ACUITY_SCORE: 44
ADLS_ACUITY_SCORE: 48
ADLS_ACUITY_SCORE: 44
ADLS_ACUITY_SCORE: 43
ADLS_ACUITY_SCORE: 44
ADLS_ACUITY_SCORE: 47
ADLS_ACUITY_SCORE: 43
ADLS_ACUITY_SCORE: 49
ADLS_ACUITY_SCORE: 49
ADLS_ACUITY_SCORE: 54
ADLS_ACUITY_SCORE: 49
ADLS_ACUITY_SCORE: 49
ADLS_ACUITY_SCORE: 50
ADLS_ACUITY_SCORE: 45
ADLS_ACUITY_SCORE: 36
ADLS_ACUITY_SCORE: 43
ADLS_ACUITY_SCORE: 53
ADLS_ACUITY_SCORE: 43
ADLS_ACUITY_SCORE: 43
ADLS_ACUITY_SCORE: 47
ADLS_ACUITY_SCORE: 46
ADLS_ACUITY_SCORE: 62
FALL_HISTORY_WITHIN_LAST_SIX_MONTHS: YES
ADLS_ACUITY_SCORE: 43
ADLS_ACUITY_SCORE: 45
ADLS_ACUITY_SCORE: 42
ADLS_ACUITY_SCORE: 60
ADLS_ACUITY_SCORE: 44
ADLS_ACUITY_SCORE: 49
ADLS_ACUITY_SCORE: 52
ADLS_ACUITY_SCORE: 58
ADLS_ACUITY_SCORE: 42
ADLS_ACUITY_SCORE: 46
ADLS_ACUITY_SCORE: 48
ADLS_ACUITY_SCORE: 64
ADLS_ACUITY_SCORE: 58
ADLS_ACUITY_SCORE: 56
ADLS_ACUITY_SCORE: 62
ADLS_ACUITY_SCORE: 60
ADLS_ACUITY_SCORE: 60
ADLS_ACUITY_SCORE: 47
ADLS_ACUITY_SCORE: 56
ADLS_ACUITY_SCORE: 43
ADLS_ACUITY_SCORE: 45
ADLS_ACUITY_SCORE: 47
TOILETING: 1-->ASSISTANCE (EQUIPMENT/PERSON) NEEDED (NOT DEVELOPMENTALLY APPROPRIATE)
ADLS_ACUITY_SCORE: 46
ADLS_ACUITY_SCORE: 62
ADLS_ACUITY_SCORE: 43
ADLS_ACUITY_SCORE: 44
ADLS_ACUITY_SCORE: 47
ADLS_ACUITY_SCORE: 48
ADLS_ACUITY_SCORE: 43
ADLS_ACUITY_SCORE: 44
ADLS_ACUITY_SCORE: 46
ADLS_ACUITY_SCORE: 46
ADLS_ACUITY_SCORE: 47
ADLS_ACUITY_SCORE: 46
ADLS_ACUITY_SCORE: 50
ADLS_ACUITY_SCORE: 56
ADLS_ACUITY_SCORE: 51
ADLS_ACUITY_SCORE: 45
ADLS_ACUITY_SCORE: 54
ADLS_ACUITY_SCORE: 46
ADLS_ACUITY_SCORE: 42
ADLS_ACUITY_SCORE: 44
ADLS_ACUITY_SCORE: 46
ADLS_ACUITY_SCORE: 56
ADLS_ACUITY_SCORE: 45
ADLS_ACUITY_SCORE: 54
ADLS_ACUITY_SCORE: 42
ADLS_ACUITY_SCORE: 45
ADLS_ACUITY_SCORE: 49
ADLS_ACUITY_SCORE: 42
ADLS_ACUITY_SCORE: 53
ADLS_ACUITY_SCORE: 55
ADLS_ACUITY_SCORE: 35
ADLS_ACUITY_SCORE: 56
ADLS_ACUITY_SCORE: 63
ADLS_ACUITY_SCORE: 60
ADLS_ACUITY_SCORE: 45
ADLS_ACUITY_SCORE: 47
ADLS_ACUITY_SCORE: 60
ADLS_ACUITY_SCORE: 45
ADLS_ACUITY_SCORE: 49
ADLS_ACUITY_SCORE: 51
ADLS_ACUITY_SCORE: 45
ADLS_ACUITY_SCORE: 43
ADLS_ACUITY_SCORE: 45
ADLS_ACUITY_SCORE: 37
ADLS_ACUITY_SCORE: 56
ADLS_ACUITY_SCORE: 43
ADLS_ACUITY_SCORE: 44
NUMBER_OF_TIMES_PATIENT_HAS_FALLEN_WITHIN_LAST_SIX_MONTHS: 3
ADLS_ACUITY_SCORE: 50
ADLS_ACUITY_SCORE: 47
ADLS_ACUITY_SCORE: 43
ADLS_ACUITY_SCORE: 43
ADLS_ACUITY_SCORE: 48
ADLS_ACUITY_SCORE: 49
ADLS_ACUITY_SCORE: 59
ADLS_ACUITY_SCORE: 46
ADLS_ACUITY_SCORE: 46
ADLS_ACUITY_SCORE: 44
ADLS_ACUITY_SCORE: 48
ADLS_ACUITY_SCORE: 49
ADLS_ACUITY_SCORE: 44
ADLS_ACUITY_SCORE: 42
ADLS_ACUITY_SCORE: 47
ADLS_ACUITY_SCORE: 46
ADLS_ACUITY_SCORE: 56
ADLS_ACUITY_SCORE: 46
ADLS_ACUITY_SCORE: 46
ADLS_ACUITY_SCORE: 62
ADLS_ACUITY_SCORE: 45
ADLS_ACUITY_SCORE: 46
ADLS_ACUITY_SCORE: 45
ADLS_ACUITY_SCORE: 47
ADLS_ACUITY_SCORE: 50
ADLS_ACUITY_SCORE: 46
ADLS_ACUITY_SCORE: 60
ADLS_ACUITY_SCORE: 53
ADLS_ACUITY_SCORE: 59
ADLS_ACUITY_SCORE: 42
ADLS_ACUITY_SCORE: 43
ADLS_ACUITY_SCORE: 44
ADLS_ACUITY_SCORE: 54
ADLS_ACUITY_SCORE: 53
ADLS_ACUITY_SCORE: 37
ADLS_ACUITY_SCORE: 62
ADLS_ACUITY_SCORE: 57
ADLS_ACUITY_SCORE: 41
ADLS_ACUITY_SCORE: 47
ADLS_ACUITY_SCORE: 57
ADLS_ACUITY_SCORE: 60
ADLS_ACUITY_SCORE: 62
ADLS_ACUITY_SCORE: 49
ADLS_ACUITY_SCORE: 44
ADLS_ACUITY_SCORE: 43
ADLS_ACUITY_SCORE: 45
ADLS_ACUITY_SCORE: 56
ADLS_ACUITY_SCORE: 44
ADLS_ACUITY_SCORE: 44
ADLS_ACUITY_SCORE: 60
ADLS_ACUITY_SCORE: 46
ADLS_ACUITY_SCORE: 49
ADLS_ACUITY_SCORE: 47
ADLS_ACUITY_SCORE: 45
ADLS_ACUITY_SCORE: 35
ADLS_ACUITY_SCORE: 47
ADLS_ACUITY_SCORE: 45
ADLS_ACUITY_SCORE: 62
ADLS_ACUITY_SCORE: 42
ADLS_ACUITY_SCORE: 60
ADLS_ACUITY_SCORE: 56
ADLS_ACUITY_SCORE: 47
ADLS_ACUITY_SCORE: 46
ADLS_ACUITY_SCORE: 46
ADLS_ACUITY_SCORE: 62
ADLS_ACUITY_SCORE: 44
PREVIOUS_RESPONSIBILITIES: MEAL PREP;HOUSEKEEPING;LAUNDRY;SHOPPING
ADLS_ACUITY_SCORE: 43
ADLS_ACUITY_SCORE: 45
ADLS_ACUITY_SCORE: 46
ADLS_ACUITY_SCORE: 46
ADLS_ACUITY_SCORE: 54
TOILETING: 1-->ASSISTANCE (EQUIPMENT/PERSON) NEEDED
ADLS_ACUITY_SCORE: 43
ADLS_ACUITY_SCORE: 54
ADLS_ACUITY_SCORE: 57
ADLS_ACUITY_SCORE: 52
DRESSING/BATHING: BATHING DIFFICULTY, ASSISTANCE 1 PERSON
ADLS_ACUITY_SCORE: 60
ADLS_ACUITY_SCORE: 46
ADLS_ACUITY_SCORE: 57
ADLS_ACUITY_SCORE: 42
ADLS_ACUITY_SCORE: 43
ADLS_ACUITY_SCORE: 43
ADLS_ACUITY_SCORE: 56
ADLS_ACUITY_SCORE: 46
ADLS_ACUITY_SCORE: 59
ADLS_ACUITY_SCORE: 62
ADLS_ACUITY_SCORE: 58
ADLS_ACUITY_SCORE: 45
ADLS_ACUITY_SCORE: 46
ADLS_ACUITY_SCORE: 60
ADLS_ACUITY_SCORE: 46
ADLS_ACUITY_SCORE: 48
ADLS_ACUITY_SCORE: 34
ADLS_ACUITY_SCORE: 43
ADLS_ACUITY_SCORE: 37
ADLS_ACUITY_SCORE: 43
ADLS_ACUITY_SCORE: 49
ADLS_ACUITY_SCORE: 46
ADLS_ACUITY_SCORE: 56
ADLS_ACUITY_SCORE: 43
ADLS_ACUITY_SCORE: 49
ADLS_ACUITY_SCORE: 45
ADLS_ACUITY_SCORE: 46
ADLS_ACUITY_SCORE: 46
ADLS_ACUITY_SCORE: 50
ADLS_ACUITY_SCORE: 47
ADLS_ACUITY_SCORE: 46
ADLS_ACUITY_SCORE: 55
ADLS_ACUITY_SCORE: 43
ADLS_ACUITY_SCORE: 54
ADLS_ACUITY_SCORE: 56
ADLS_ACUITY_SCORE: 62
ADLS_ACUITY_SCORE: 62
ADLS_ACUITY_SCORE: 41
ADLS_ACUITY_SCORE: 44
ADLS_ACUITY_SCORE: 46
ADLS_ACUITY_SCORE: 52
ADLS_ACUITY_SCORE: 58
ADLS_ACUITY_SCORE: 47
ADLS_ACUITY_SCORE: 45
ADLS_ACUITY_SCORE: 56
ADLS_ACUITY_SCORE: 45
ADLS_ACUITY_SCORE: 55
ADLS_ACUITY_SCORE: 58
ADLS_ACUITY_SCORE: 44
ADLS_ACUITY_SCORE: 43
ADLS_ACUITY_SCORE: 46
ADLS_ACUITY_SCORE: 49
ADLS_ACUITY_SCORE: 42
ADLS_ACUITY_SCORE: 46
DRESS: 1-->ASSISTANCE (EQUIPMENT/PERSON) NEEDED
ADLS_ACUITY_SCORE: 60
ADLS_ACUITY_SCORE: 47
ADLS_ACUITY_SCORE: 43
ADLS_ACUITY_SCORE: 47
ADLS_ACUITY_SCORE: 52
ADLS_ACUITY_SCORE: 42
ADLS_ACUITY_SCORE: 55
ADLS_ACUITY_SCORE: 49
ADLS_ACUITY_SCORE: 46
ADLS_ACUITY_SCORE: 50
ADLS_ACUITY_SCORE: 44
ADLS_ACUITY_SCORE: 48
ADLS_ACUITY_SCORE: 45
ADLS_ACUITY_SCORE: 44
ADLS_ACUITY_SCORE: 48
ADLS_ACUITY_SCORE: 49
ADLS_ACUITY_SCORE: 42
ADLS_ACUITY_SCORE: 60
ADLS_ACUITY_SCORE: 43
ADLS_ACUITY_SCORE: 46
ADLS_ACUITY_SCORE: 42
ADLS_ACUITY_SCORE: 46
ADLS_ACUITY_SCORE: 49
ADLS_ACUITY_SCORE: 43
ADLS_ACUITY_SCORE: 54
ADLS_ACUITY_SCORE: 65
ADLS_ACUITY_SCORE: 46
ADLS_ACUITY_SCORE: 34
ADLS_ACUITY_SCORE: 57
ADLS_ACUITY_SCORE: 60
ADLS_ACUITY_SCORE: 43
ADLS_ACUITY_SCORE: 47
ADLS_ACUITY_SCORE: 46
ADLS_ACUITY_SCORE: 46
CONCENTRATING,_REMEMBERING_OR_MAKING_DECISIONS_DIFFICULTY: NO
ADLS_ACUITY_SCORE: 62
ADLS_ACUITY_SCORE: 56
ADLS_ACUITY_SCORE: 43
ADLS_ACUITY_SCORE: 43
ADLS_ACUITY_SCORE: 45
ADLS_ACUITY_SCORE: 43
ADLS_ACUITY_SCORE: 56
ADLS_ACUITY_SCORE: 60
ADLS_ACUITY_SCORE: 45
ADLS_ACUITY_SCORE: 43
ADLS_ACUITY_SCORE: 42
ADLS_ACUITY_SCORE: 51
ADLS_ACUITY_SCORE: 47
ADLS_ACUITY_SCORE: 34
ADLS_ACUITY_SCORE: 43
ADLS_ACUITY_SCORE: 45
ADLS_ACUITY_SCORE: 43
ADLS_ACUITY_SCORE: 41
ADLS_ACUITY_SCORE: 58
ADLS_ACUITY_SCORE: 46
ADLS_ACUITY_SCORE: 57
ADLS_ACUITY_SCORE: 49
ADLS_ACUITY_SCORE: 45
ADLS_ACUITY_SCORE: 54
ADLS_ACUITY_SCORE: 49
ADLS_ACUITY_SCORE: 54
ADLS_ACUITY_SCORE: 44
ADLS_ACUITY_SCORE: 45
ADLS_ACUITY_SCORE: 43
ADLS_ACUITY_SCORE: 56
ADLS_ACUITY_SCORE: 42
ADLS_ACUITY_SCORE: 46
ADLS_ACUITY_SCORE: 52
ADLS_ACUITY_SCORE: 43
ADLS_ACUITY_SCORE: 64
ADLS_ACUITY_SCORE: 45
ADLS_ACUITY_SCORE: 44
ADLS_ACUITY_SCORE: 49
ADLS_ACUITY_SCORE: 56
ADLS_ACUITY_SCORE: 55
ADLS_ACUITY_SCORE: 49
ADLS_ACUITY_SCORE: 43
ADLS_ACUITY_SCORE: 60
ADLS_ACUITY_SCORE: 46
ADLS_ACUITY_SCORE: 50
ADLS_ACUITY_SCORE: 46
ADLS_ACUITY_SCORE: 53
ADLS_ACUITY_SCORE: 47
ADLS_ACUITY_SCORE: 46
ADLS_ACUITY_SCORE: 43
ADLS_ACUITY_SCORE: 44
ADLS_ACUITY_SCORE: 42
ADLS_ACUITY_SCORE: 54
ADLS_ACUITY_SCORE: 56
ADLS_ACUITY_SCORE: 46
ADLS_ACUITY_SCORE: 43
ADLS_ACUITY_SCORE: 49
ADLS_ACUITY_SCORE: 64
ADLS_ACUITY_SCORE: 44
ADLS_ACUITY_SCORE: 44
ADLS_ACUITY_SCORE: 42
ADLS_ACUITY_SCORE: 46
ADLS_ACUITY_SCORE: 44
ADLS_ACUITY_SCORE: 37
ADLS_ACUITY_SCORE: 42
ADLS_ACUITY_SCORE: 42
ADLS_ACUITY_SCORE: 46
ADLS_ACUITY_SCORE: 44
ADLS_ACUITY_SCORE: 47
ADLS_ACUITY_SCORE: 48
ADLS_ACUITY_SCORE: 60
ADLS_ACUITY_SCORE: 46
ADLS_ACUITY_SCORE: 45
ADLS_ACUITY_SCORE: 45
ADLS_ACUITY_SCORE: 62
ADLS_ACUITY_SCORE: 59
WALKING_OR_CLIMBING_STAIRS_DIFFICULTY: YES
ADLS_ACUITY_SCORE: 60
ADLS_ACUITY_SCORE: 46
ADLS_ACUITY_SCORE: 45
ADLS_ACUITY_SCORE: 62
ADLS_ACUITY_SCORE: 42
ADLS_ACUITY_SCORE: 44
ADLS_ACUITY_SCORE: 51
ADLS_ACUITY_SCORE: 49
ADLS_ACUITY_SCORE: 44
ADLS_ACUITY_SCORE: 59
ADLS_ACUITY_SCORE: 44
ADLS_ACUITY_SCORE: 48
ADLS_ACUITY_SCORE: 46
ADLS_ACUITY_SCORE: 48
ADLS_ACUITY_SCORE: 46
ADLS_ACUITY_SCORE: 62
ADLS_ACUITY_SCORE: 44
ADLS_ACUITY_SCORE: 42
ADLS_ACUITY_SCORE: 48
ADLS_ACUITY_SCORE: 64
ADLS_ACUITY_SCORE: 41
ADLS_ACUITY_SCORE: 45
ADLS_ACUITY_SCORE: 42
ADLS_ACUITY_SCORE: 46
ADLS_ACUITY_SCORE: 57
ADLS_ACUITY_SCORE: 46
DRESS: 1-->ASSISTANCE (EQUIPMENT/PERSON) NEEDED
ADLS_ACUITY_SCORE: 44
ADLS_ACUITY_SCORE: 49
ADLS_ACUITY_SCORE: 37
ADLS_ACUITY_SCORE: 42
ADLS_ACUITY_SCORE: 46
ADLS_ACUITY_SCORE: 42
ADLS_ACUITY_SCORE: 58
DOING_ERRANDS_INDEPENDENTLY_DIFFICULTY: YES
ADLS_ACUITY_SCORE: 42
ADLS_ACUITY_SCORE: 64
ADLS_ACUITY_SCORE: 42
ADLS_ACUITY_SCORE: 43
ADLS_ACUITY_SCORE: 42
ADLS_ACUITY_SCORE: 53
ADLS_ACUITY_SCORE: 49
ADLS_ACUITY_SCORE: 42
ADLS_ACUITY_SCORE: 42
TRANSFERRING: 1-->ASSISTANCE (EQUIPMENT/PERSON) NEEDED
ADLS_ACUITY_SCORE: 60
ADLS_ACUITY_SCORE: 49
ADLS_ACUITY_SCORE: 42
ADLS_ACUITY_SCORE: 46
ADLS_ACUITY_SCORE: 44
ADLS_ACUITY_SCORE: 54
ADLS_ACUITY_SCORE: 46
WALKING_OR_CLIMBING_STAIRS: AMBULATION DIFFICULTY, REQUIRES EQUIPMENT
ADLS_ACUITY_SCORE: 42
ADLS_ACUITY_SCORE: 47
ADLS_ACUITY_SCORE: 42
ADLS_ACUITY_SCORE: 46
ADLS_ACUITY_SCORE: 58
ADLS_ACUITY_SCORE: 42
ADLS_ACUITY_SCORE: 56
ADLS_ACUITY_SCORE: 49
ADLS_ACUITY_SCORE: 62
ADLS_ACUITY_SCORE: 45
CHANGE_IN_FUNCTIONAL_STATUS_SINCE_ONSET_OF_CURRENT_ILLNESS/INJURY: NO
ADLS_ACUITY_SCORE: 46
ADLS_ACUITY_SCORE: 45
ADLS_ACUITY_SCORE: 48
ADLS_ACUITY_SCORE: 43
ADLS_ACUITY_SCORE: 60
ADLS_ACUITY_SCORE: 43
ADLS_ACUITY_SCORE: 58
ADLS_ACUITY_SCORE: 50
ADLS_ACUITY_SCORE: 56
ADLS_ACUITY_SCORE: 47
ADLS_ACUITY_SCORE: 47
ADLS_ACUITY_SCORE: 46
ADLS_ACUITY_SCORE: 60
ADLS_ACUITY_SCORE: 54
ADLS_ACUITY_SCORE: 48
ADLS_ACUITY_SCORE: 44
ADLS_ACUITY_SCORE: 43
ADLS_ACUITY_SCORE: 60
ADLS_ACUITY_SCORE: 44
ADLS_ACUITY_SCORE: 46
ADLS_ACUITY_SCORE: 43
ADLS_ACUITY_SCORE: 49
ADLS_ACUITY_SCORE: 45
ADLS_ACUITY_SCORE: 43
ADLS_ACUITY_SCORE: 48
ADLS_ACUITY_SCORE: 41
ADLS_ACUITY_SCORE: 46
ADLS_ACUITY_SCORE: 42
ADLS_ACUITY_SCORE: 44
ADLS_ACUITY_SCORE: 50
ADLS_ACUITY_SCORE: 44
ADLS_ACUITY_SCORE: 51
ADLS_ACUITY_SCORE: 54
ADLS_ACUITY_SCORE: 42
ADLS_ACUITY_SCORE: 54
ADLS_ACUITY_SCORE: 49
ADLS_ACUITY_SCORE: 46
ADLS_ACUITY_SCORE: 54
ADLS_ACUITY_SCORE: 49
ADLS_ACUITY_SCORE: 60
ADLS_ACUITY_SCORE: 48
ADLS_ACUITY_SCORE: 53
ADLS_ACUITY_SCORE: 46
ADLS_ACUITY_SCORE: 56
ADLS_ACUITY_SCORE: 42
ADLS_ACUITY_SCORE: 49
ADLS_ACUITY_SCORE: 43
ADLS_ACUITY_SCORE: 57
ADLS_ACUITY_SCORE: 60
ADLS_ACUITY_SCORE: 47
ADLS_ACUITY_SCORE: 42
ADLS_ACUITY_SCORE: 46
ADLS_ACUITY_SCORE: 55
ADLS_ACUITY_SCORE: 60
ADLS_ACUITY_SCORE: 46
ADLS_ACUITY_SCORE: 42
ADLS_ACUITY_SCORE: 55
ADLS_ACUITY_SCORE: 48
ADLS_ACUITY_SCORE: 62
ADLS_ACUITY_SCORE: 46
ADLS_ACUITY_SCORE: 44
ADLS_ACUITY_SCORE: 47
ADLS_ACUITY_SCORE: 42
TOILETING: 1-->ASSISTANCE (EQUIPMENT/PERSON) NEEDED
ADLS_ACUITY_SCORE: 45
ADLS_ACUITY_SCORE: 60
ADLS_ACUITY_SCORE: 43
ADLS_ACUITY_SCORE: 56
ADLS_ACUITY_SCORE: 60
ADLS_ACUITY_SCORE: 49
ADLS_ACUITY_SCORE: 65
ADLS_ACUITY_SCORE: 43
ADLS_ACUITY_SCORE: 46
ADLS_ACUITY_SCORE: 43
ADLS_ACUITY_SCORE: 56
ADLS_ACUITY_SCORE: 49
ADLS_ACUITY_SCORE: 43
ADLS_ACUITY_SCORE: 45
ADLS_ACUITY_SCORE: 49
ADLS_ACUITY_SCORE: 45
BATHING: 1-->ASSISTANCE NEEDED
ADLS_ACUITY_SCORE: 46
BATHING: 1-->ASSISTANCE NEEDED
ADLS_ACUITY_SCORE: 47
ADLS_ACUITY_SCORE: 44
ADLS_ACUITY_SCORE: 45
ADLS_ACUITY_SCORE: 45
ADLS_ACUITY_SCORE: 47
ADLS_ACUITY_SCORE: 62
ADLS_ACUITY_SCORE: 54
ADLS_ACUITY_SCORE: 52
ADLS_ACUITY_SCORE: 49
ADLS_ACUITY_SCORE: 49
ADLS_ACUITY_SCORE: 39
ADLS_ACUITY_SCORE: 47
ADLS_ACUITY_SCORE: 45
ADLS_ACUITY_SCORE: 46
ADLS_ACUITY_SCORE: 46
ADLS_ACUITY_SCORE: 44
ADLS_ACUITY_SCORE: 42
ADLS_ACUITY_SCORE: 43
ADLS_ACUITY_SCORE: 46
ADLS_ACUITY_SCORE: 42
ADLS_ACUITY_SCORE: 62
ADLS_ACUITY_SCORE: 60
ADLS_ACUITY_SCORE: 64
ADLS_ACUITY_SCORE: 45
ADLS_ACUITY_SCORE: 44
ADLS_ACUITY_SCORE: 48
ADLS_ACUITY_SCORE: 52
ADLS_ACUITY_SCORE: 41
ADLS_ACUITY_SCORE: 44
ADLS_ACUITY_SCORE: 43
ADLS_ACUITY_SCORE: 62
ADLS_ACUITY_SCORE: 42
ADLS_ACUITY_SCORE: 44
ADLS_ACUITY_SCORE: 42
ADLS_ACUITY_SCORE: 42
ADLS_ACUITY_SCORE: 57
ADLS_ACUITY_SCORE: 46
ADLS_ACUITY_SCORE: 44
ADLS_ACUITY_SCORE: 48
ADLS_ACUITY_SCORE: 42
ADLS_ACUITY_SCORE: 45
ADLS_ACUITY_SCORE: 46
ADLS_ACUITY_SCORE: 54
ADLS_ACUITY_SCORE: 65
ADLS_ACUITY_SCORE: 54
ADLS_ACUITY_SCORE: 47
ADLS_ACUITY_SCORE: 52
ADLS_ACUITY_SCORE: 42
ADLS_ACUITY_SCORE: 47
ADLS_ACUITY_SCORE: 42
ADLS_ACUITY_SCORE: 43
ADLS_ACUITY_SCORE: 48
ADLS_ACUITY_SCORE: 64
ADLS_ACUITY_SCORE: 44
ADLS_ACUITY_SCORE: 47
ADLS_ACUITY_SCORE: 64
ADLS_ACUITY_SCORE: 55
ADLS_ACUITY_SCORE: 46
TOILETING: 1-->ASSISTANCE (EQUIPMENT/PERSON) NEEDED (NOT DEVELOPMENTALLY APPROPRIATE)
ADLS_ACUITY_SCORE: 47
ADLS_ACUITY_SCORE: 43
ADLS_ACUITY_SCORE: 45
ADLS_ACUITY_SCORE: 50
ADLS_ACUITY_SCORE: 46
ADLS_ACUITY_SCORE: 56
ADLS_ACUITY_SCORE: 42
ADLS_ACUITY_SCORE: 41
ADLS_ACUITY_SCORE: 47
ADLS_ACUITY_SCORE: 49
ADLS_ACUITY_SCORE: 48
ADLS_ACUITY_SCORE: 49
ADLS_ACUITY_SCORE: 59
ADLS_ACUITY_SCORE: 44
ADLS_ACUITY_SCORE: 49
ADLS_ACUITY_SCORE: 56
ADLS_ACUITY_SCORE: 50
ADLS_ACUITY_SCORE: 46
TRANSFERRING: 1-->ASSISTANCE (EQUIPMENT/PERSON) NEEDED
DOING_ERRANDS_INDEPENDENTLY_DIFFICULTY: YES
ADLS_ACUITY_SCORE: 62
ADLS_ACUITY_SCORE: 46
ADLS_ACUITY_SCORE: 43
ADLS_ACUITY_SCORE: 59
ADLS_ACUITY_SCORE: 45
ADLS_ACUITY_SCORE: 47
ADLS_ACUITY_SCORE: 51
ADLS_ACUITY_SCORE: 49
WEAR_GLASSES_OR_BLIND: YES
ADLS_ACUITY_SCORE: 52
FALL_HISTORY_WITHIN_LAST_SIX_MONTHS: NO
ADLS_ACUITY_SCORE: 47
ADLS_ACUITY_SCORE: 43
ADLS_ACUITY_SCORE: 45
ADLS_ACUITY_SCORE: 46
ADLS_ACUITY_SCORE: 60
ADLS_ACUITY_SCORE: 64
ADLS_ACUITY_SCORE: 64
ADLS_ACUITY_SCORE: 42
ADLS_ACUITY_SCORE: 65
ADLS_ACUITY_SCORE: 54
ADLS_ACUITY_SCORE: 46
ADLS_ACUITY_SCORE: 43
ADLS_ACUITY_SCORE: 42
ADLS_ACUITY_SCORE: 50
ADLS_ACUITY_SCORE: 41
ADLS_ACUITY_SCORE: 42
ADLS_ACUITY_SCORE: 60
ADLS_ACUITY_SCORE: 46
ADLS_ACUITY_SCORE: 56
ADLS_ACUITY_SCORE: 46
ADLS_ACUITY_SCORE: 62
ADLS_ACUITY_SCORE: 41
ADLS_ACUITY_SCORE: 44
ADLS_ACUITY_SCORE: 45
ADLS_ACUITY_SCORE: 52
ADLS_ACUITY_SCORE: 48
ADLS_ACUITY_SCORE: 43
ADLS_ACUITY_SCORE: 60
ADLS_ACUITY_SCORE: 42
ADLS_ACUITY_SCORE: 57
ADLS_ACUITY_SCORE: 44
ADLS_ACUITY_SCORE: 49
ADLS_ACUITY_SCORE: 47
ADLS_ACUITY_SCORE: 43
ADLS_ACUITY_SCORE: 46
ADLS_ACUITY_SCORE: 49
ADLS_ACUITY_SCORE: 42
ADLS_ACUITY_SCORE: 43
ADLS_ACUITY_SCORE: 60
ADLS_ACUITY_SCORE: 45
ADLS_ACUITY_SCORE: 37
ADLS_ACUITY_SCORE: 53
ADLS_ACUITY_SCORE: 43
ADLS_ACUITY_SCORE: 44
ADLS_ACUITY_SCORE: 43
ADLS_ACUITY_SCORE: 43
ADLS_ACUITY_SCORE: 37
ADLS_ACUITY_SCORE: 46
ADLS_ACUITY_SCORE: 45
ADLS_ACUITY_SCORE: 42
ADLS_ACUITY_SCORE: 45
ADLS_ACUITY_SCORE: 56
ADLS_ACUITY_SCORE: 49
ADLS_ACUITY_SCORE: 44
ADLS_ACUITY_SCORE: 60
ADLS_ACUITY_SCORE: 35
ADLS_ACUITY_SCORE: 46
ADLS_ACUITY_SCORE: 60
ADLS_ACUITY_SCORE: 45
ADLS_ACUITY_SCORE: 45
ADLS_ACUITY_SCORE: 43
ADLS_ACUITY_SCORE: 60
ADLS_ACUITY_SCORE: 58
ADLS_ACUITY_SCORE: 46
ADLS_ACUITY_SCORE: 60
WALKING_OR_CLIMBING_STAIRS: AMBULATION DIFFICULTY, REQUIRES EQUIPMENT;TRANSFERRING DIFFICULTY, REQUIRES EQUIPMENT
ADLS_ACUITY_SCORE: 45
ADLS_ACUITY_SCORE: 55
ADLS_ACUITY_SCORE: 44
ADLS_ACUITY_SCORE: 43
ADLS_ACUITY_SCORE: 49
ADLS_ACUITY_SCORE: 46
VISION_MANAGEMENT: GLASSES
ADLS_ACUITY_SCORE: 46
ADLS_ACUITY_SCORE: 42
ADLS_ACUITY_SCORE: 51
ADLS_ACUITY_SCORE: 51
ADLS_ACUITY_SCORE: 44
ADLS_ACUITY_SCORE: 34
ADLS_ACUITY_SCORE: 46
ADLS_ACUITY_SCORE: 46
ADLS_ACUITY_SCORE: 49
ADLS_ACUITY_SCORE: 47
ADLS_ACUITY_SCORE: 43
ADLS_ACUITY_SCORE: 56
ADLS_ACUITY_SCORE: 47
ADLS_ACUITY_SCORE: 45
ADLS_ACUITY_SCORE: 43
ADLS_ACUITY_SCORE: 45
ADLS_ACUITY_SCORE: 58
ADLS_ACUITY_SCORE: 53
ADLS_ACUITY_SCORE: 42
ADLS_ACUITY_SCORE: 56
ADLS_ACUITY_SCORE: 49
ADLS_ACUITY_SCORE: 43
ADLS_ACUITY_SCORE: 65
DRESS: 1-->ASSISTANCE (EQUIPMENT/PERSON) NEEDED (NOT DEVELOPMENTALLY APPROPRIATE)
ADLS_ACUITY_SCORE: 43
ADLS_ACUITY_SCORE: 45
ADLS_ACUITY_SCORE: 60
ADLS_ACUITY_SCORE: 49
ADLS_ACUITY_SCORE: 48
ADLS_ACUITY_SCORE: 53
ADLS_ACUITY_SCORE: 49
ADLS_ACUITY_SCORE: 49
ADLS_ACUITY_SCORE: 43
ADLS_ACUITY_SCORE: 44
ADLS_ACUITY_SCORE: 45
ADLS_ACUITY_SCORE: 47
ADLS_ACUITY_SCORE: 45
ADLS_ACUITY_SCORE: 60
ADLS_ACUITY_SCORE: 50
ADLS_ACUITY_SCORE: 44
ADLS_ACUITY_SCORE: 43
ADLS_ACUITY_SCORE: 46
ADLS_ACUITY_SCORE: 56
ADLS_ACUITY_SCORE: 44
ADLS_ACUITY_SCORE: 46
ADLS_ACUITY_SCORE: 47
ADLS_ACUITY_SCORE: 51
ADLS_ACUITY_SCORE: 60
ADLS_ACUITY_SCORE: 50
ADLS_ACUITY_SCORE: 44
ADLS_ACUITY_SCORE: 46
ADLS_ACUITY_SCORE: 54
TOILETING_ISSUES: YES
ADLS_ACUITY_SCORE: 59
ADLS_ACUITY_SCORE: 60
TOILETING_ASSISTANCE: TOILETING DIFFICULTY, ASSISTANCE 1 PERSON
ADLS_ACUITY_SCORE: 56
ADLS_ACUITY_SCORE: 62
ADLS_ACUITY_SCORE: 43
ADLS_ACUITY_SCORE: 55
ADLS_ACUITY_SCORE: 64
ADLS_ACUITY_SCORE: 56
ADLS_ACUITY_SCORE: 47
ADLS_ACUITY_SCORE: 46
ADLS_ACUITY_SCORE: 49
ADLS_ACUITY_SCORE: 46
ADLS_ACUITY_SCORE: 49
ADLS_ACUITY_SCORE: 62
ADLS_ACUITY_SCORE: 43
ADLS_ACUITY_SCORE: 49
ADLS_ACUITY_SCORE: 42
ADLS_ACUITY_SCORE: 46
ADLS_ACUITY_SCORE: 46
ADLS_ACUITY_SCORE: 48
ADLS_ACUITY_SCORE: 42
ADLS_ACUITY_SCORE: 43
ADLS_ACUITY_SCORE: 46
ADLS_ACUITY_SCORE: 42
ADLS_ACUITY_SCORE: 43
ADLS_ACUITY_SCORE: 56
ADLS_ACUITY_SCORE: 37
ADLS_ACUITY_SCORE: 43
ADLS_ACUITY_SCORE: 54
ADLS_ACUITY_SCORE: 49
ADLS_ACUITY_SCORE: 59
ADLS_ACUITY_SCORE: 45
ADLS_ACUITY_SCORE: 42
ADLS_ACUITY_SCORE: 49
ADLS_ACUITY_SCORE: 43
ADLS_ACUITY_SCORE: 43
ADLS_ACUITY_SCORE: 44
ADLS_ACUITY_SCORE: 44
ADLS_ACUITY_SCORE: 45
ADLS_ACUITY_SCORE: 45
ADLS_ACUITY_SCORE: 51
ADLS_ACUITY_SCORE: 46
ADLS_ACUITY_SCORE: 54
ADLS_ACUITY_SCORE: 43
TOILETING_MANAGEMENT: ASSISTANCE
ADLS_ACUITY_SCORE: 49
ADLS_ACUITY_SCORE: 44
ADLS_ACUITY_SCORE: 50
ADLS_ACUITY_SCORE: 59
ADLS_ACUITY_SCORE: 46
ADLS_ACUITY_SCORE: 46
ADLS_ACUITY_SCORE: 42
ADLS_ACUITY_SCORE: 49
ADLS_ACUITY_SCORE: 54
ADLS_ACUITY_SCORE: 60
ADLS_ACUITY_SCORE: 54
ADLS_ACUITY_SCORE: 60
ADLS_ACUITY_SCORE: 45
CHANGE_IN_FUNCTIONAL_STATUS_SINCE_ONSET_OF_CURRENT_ILLNESS/INJURY: NO
ADLS_ACUITY_SCORE: 46
ADLS_ACUITY_SCORE: 36
ADLS_ACUITY_SCORE: 34
ADLS_ACUITY_SCORE: 62
ADLS_ACUITY_SCORE: 45
ADLS_ACUITY_SCORE: 56
ADLS_ACUITY_SCORE: 43
ADLS_ACUITY_SCORE: 55
ADLS_ACUITY_SCORE: 45
ADLS_ACUITY_SCORE: 60
ADLS_ACUITY_SCORE: 55
ADLS_ACUITY_SCORE: 43
ADLS_ACUITY_SCORE: 42
ADLS_ACUITY_SCORE: 42
ADLS_ACUITY_SCORE: 51
ADLS_ACUITY_SCORE: 43
ADLS_ACUITY_SCORE: 47
ADLS_ACUITY_SCORE: 56
ADLS_ACUITY_SCORE: 42
ADLS_ACUITY_SCORE: 41
ADLS_ACUITY_SCORE: 65
ADLS_ACUITY_SCORE: 46
ADLS_ACUITY_SCORE: 58
ADLS_ACUITY_SCORE: 54
ADLS_ACUITY_SCORE: 42
ADLS_ACUITY_SCORE: 46
ADLS_ACUITY_SCORE: 42
ADLS_ACUITY_SCORE: 46
ADLS_ACUITY_SCORE: 58
ADLS_ACUITY_SCORE: 46
ADLS_ACUITY_SCORE: 42
ADLS_ACUITY_SCORE: 59
ADLS_ACUITY_SCORE: 44
ADLS_ACUITY_SCORE: 42
DRESS: 0-->ASSISTANCE NEEDED (DEVELOPMENTALLY APPROPRIATE)
ADLS_ACUITY_SCORE: 46
ADLS_ACUITY_SCORE: 46
ADLS_ACUITY_SCORE: 62
ADLS_ACUITY_SCORE: 55
ADLS_ACUITY_SCORE: 58
ADLS_ACUITY_SCORE: 49
ADLS_ACUITY_SCORE: 60
ADLS_ACUITY_SCORE: 60
ADLS_ACUITY_SCORE: 51
ADLS_ACUITY_SCORE: 41
ADLS_ACUITY_SCORE: 46
ADLS_ACUITY_SCORE: 34
ADLS_ACUITY_SCORE: 42
ADLS_ACUITY_SCORE: 45
ADLS_ACUITY_SCORE: 53
ADLS_ACUITY_SCORE: 54
ADLS_ACUITY_SCORE: 47
ADLS_ACUITY_SCORE: 47
ADLS_ACUITY_SCORE: 43
ADLS_ACUITY_SCORE: 60
ADLS_ACUITY_SCORE: 46
ADLS_ACUITY_SCORE: 43
ADLS_ACUITY_SCORE: 46
ADLS_ACUITY_SCORE: 46
ADLS_ACUITY_SCORE: 45
ADLS_ACUITY_SCORE: 56
ADLS_ACUITY_SCORE: 44
ADLS_ACUITY_SCORE: 44
ADLS_ACUITY_SCORE: 46
ADLS_ACUITY_SCORE: 44
ADLS_ACUITY_SCORE: 54
ADLS_ACUITY_SCORE: 44
ADLS_ACUITY_SCORE: 46
ADLS_ACUITY_SCORE: 47
ADLS_ACUITY_SCORE: 45
ADLS_ACUITY_SCORE: 42
ADLS_ACUITY_SCORE: 45
ADLS_ACUITY_SCORE: 45
ADLS_ACUITY_SCORE: 42
ADLS_ACUITY_SCORE: 45
ADLS_ACUITY_SCORE: 42
ADLS_ACUITY_SCORE: 52
ADLS_ACUITY_SCORE: 48
ADLS_ACUITY_SCORE: 46
ADLS_ACUITY_SCORE: 43
ADLS_ACUITY_SCORE: 46
ADLS_ACUITY_SCORE: 42
ADLS_ACUITY_SCORE: 46
ADLS_ACUITY_SCORE: 45
ADLS_ACUITY_SCORE: 44
ADLS_ACUITY_SCORE: 64
ADLS_ACUITY_SCORE: 45
ADLS_ACUITY_SCORE: 49
ADLS_ACUITY_SCORE: 57
ADLS_ACUITY_SCORE: 44
ADLS_ACUITY_SCORE: 45
ADLS_ACUITY_SCORE: 47
ADLS_ACUITY_SCORE: 52
ADLS_ACUITY_SCORE: 49
ADLS_ACUITY_SCORE: 44
ADLS_ACUITY_SCORE: 62
ADLS_ACUITY_SCORE: 53
ADLS_ACUITY_SCORE: 34
ADLS_ACUITY_SCORE: 60
ADLS_ACUITY_SCORE: 62
ADLS_ACUITY_SCORE: 42
ADLS_ACUITY_SCORE: 47
ADLS_ACUITY_SCORE: 46
ADLS_ACUITY_SCORE: 48
CHANGE_IN_FUNCTIONAL_STATUS_SINCE_ONSET_OF_CURRENT_ILLNESS/INJURY: YES
ADLS_ACUITY_SCORE: 59
ADLS_ACUITY_SCORE: 52
ADLS_ACUITY_SCORE: 43
ADLS_ACUITY_SCORE: 46
ADLS_ACUITY_SCORE: 44
ADLS_ACUITY_SCORE: 60
ADLS_ACUITY_SCORE: 43
ADLS_ACUITY_SCORE: 42
ADLS_ACUITY_SCORE: 54
ADLS_ACUITY_SCORE: 43
ADLS_ACUITY_SCORE: 49
ADLS_ACUITY_SCORE: 46
ADLS_ACUITY_SCORE: 47
ADLS_ACUITY_SCORE: 60
ADLS_ACUITY_SCORE: 56
ADLS_ACUITY_SCORE: 42
ADLS_ACUITY_SCORE: 34
ADLS_ACUITY_SCORE: 49
ADLS_ACUITY_SCORE: 46
ADLS_ACUITY_SCORE: 55
ADLS_ACUITY_SCORE: 34
ADLS_ACUITY_SCORE: 61
ADLS_ACUITY_SCORE: 44
ADLS_ACUITY_SCORE: 49
ADLS_ACUITY_SCORE: 49
ADLS_ACUITY_SCORE: 46
ADLS_ACUITY_SCORE: 43
ADLS_ACUITY_SCORE: 47
ADLS_ACUITY_SCORE: 43
ADLS_ACUITY_SCORE: 41
ADLS_ACUITY_SCORE: 34
ADLS_ACUITY_SCORE: 65
ADLS_ACUITY_SCORE: 54
ADLS_ACUITY_SCORE: 42
ADLS_ACUITY_SCORE: 47
ADLS_ACUITY_SCORE: 47
ADLS_ACUITY_SCORE: 46
ADLS_ACUITY_SCORE: 56
ADLS_ACUITY_SCORE: 46
ADLS_ACUITY_SCORE: 45
ADLS_ACUITY_SCORE: 60
ADLS_ACUITY_SCORE: 43
ADLS_ACUITY_SCORE: 42
ADLS_ACUITY_SCORE: 54
ADLS_ACUITY_SCORE: 43
ADLS_ACUITY_SCORE: 51
ADLS_ACUITY_SCORE: 65
ADLS_ACUITY_SCORE: 48
ADLS_ACUITY_SCORE: 42
DOING_ERRANDS_INDEPENDENTLY_DIFFICULTY: YES
ADLS_ACUITY_SCORE: 62
ADLS_ACUITY_SCORE: 48
ADLS_ACUITY_SCORE: 47
ADLS_ACUITY_SCORE: 54
ADLS_ACUITY_SCORE: 49
ADLS_ACUITY_SCORE: 46
ADLS_ACUITY_SCORE: 56
FALL_HISTORY_WITHIN_LAST_SIX_MONTHS: NO
ADLS_ACUITY_SCORE: 59
ADLS_ACUITY_SCORE: 54
ADLS_ACUITY_SCORE: 54
ADLS_ACUITY_SCORE: 44
ADLS_ACUITY_SCORE: 54
ADLS_ACUITY_SCORE: 46
ADLS_ACUITY_SCORE: 54
ADLS_ACUITY_SCORE: 46
ADLS_ACUITY_SCORE: 46
ADLS_ACUITY_SCORE: 48
ADLS_ACUITY_SCORE: 42
ADLS_ACUITY_SCORE: 60
ADLS_ACUITY_SCORE: 42
ADLS_ACUITY_SCORE: 46
ADLS_ACUITY_SCORE: 47
ADLS_ACUITY_SCORE: 43
ADLS_ACUITY_SCORE: 53
ADLS_ACUITY_SCORE: 36
ADLS_ACUITY_SCORE: 47
ADLS_ACUITY_SCORE: 50
ADLS_ACUITY_SCORE: 43
ADLS_ACUITY_SCORE: 60
ADLS_ACUITY_SCORE: 54
ADLS_ACUITY_SCORE: 42
ADLS_ACUITY_SCORE: 45
ADLS_ACUITY_SCORE: 45
ADLS_ACUITY_SCORE: 58
ADLS_ACUITY_SCORE: 46
ADLS_ACUITY_SCORE: 47
ADLS_ACUITY_SCORE: 56
ADLS_ACUITY_SCORE: 45
ADLS_ACUITY_SCORE: 46
ADLS_ACUITY_SCORE: 59
ADLS_ACUITY_SCORE: 46
ADLS_ACUITY_SCORE: 48
ADLS_ACUITY_SCORE: 44
ADLS_ACUITY_SCORE: 46
DRESS: 1-->ASSISTANCE (EQUIPMENT/PERSON) NEEDED
ADLS_ACUITY_SCORE: 46
ADLS_ACUITY_SCORE: 46
ADLS_ACUITY_SCORE: 42
ADLS_ACUITY_SCORE: 46
ADLS_ACUITY_SCORE: 56
ADLS_ACUITY_SCORE: 46
ADLS_ACUITY_SCORE: 45
ADLS_ACUITY_SCORE: 62
ADLS_ACUITY_SCORE: 46
ADLS_ACUITY_SCORE: 44
ADLS_ACUITY_SCORE: 42
ADLS_ACUITY_SCORE: 54
ADLS_ACUITY_SCORE: 52
ADLS_ACUITY_SCORE: 48
ADLS_ACUITY_SCORE: 44
ADLS_ACUITY_SCORE: 45
ADLS_ACUITY_SCORE: 46
ADLS_ACUITY_SCORE: 43
ADLS_ACUITY_SCORE: 46
ADLS_ACUITY_SCORE: 58
ADLS_ACUITY_SCORE: 42
ADLS_ACUITY_SCORE: 34
ADLS_ACUITY_SCORE: 46
ADLS_ACUITY_SCORE: 60
ADLS_ACUITY_SCORE: 47
ADLS_ACUITY_SCORE: 42
ADLS_ACUITY_SCORE: 51
ADLS_ACUITY_SCORE: 59
ADLS_ACUITY_SCORE: 47
ADLS_ACUITY_SCORE: 64
ADLS_ACUITY_SCORE: 47
ADLS_ACUITY_SCORE: 46
ADLS_ACUITY_SCORE: 57
ADLS_ACUITY_SCORE: 43
ADLS_ACUITY_SCORE: 60
ADLS_ACUITY_SCORE: 62
ADLS_ACUITY_SCORE: 62
ADLS_ACUITY_SCORE: 45
ADLS_ACUITY_SCORE: 65
ADLS_ACUITY_SCORE: 46
ADLS_ACUITY_SCORE: 42
ADLS_ACUITY_SCORE: 54
DIFFICULTY_EATING/SWALLOWING: NO
CONCENTRATING,_REMEMBERING_OR_MAKING_DECISIONS_DIFFICULTY: NO
ADLS_ACUITY_SCORE: 54
ADLS_ACUITY_SCORE: 49
ADLS_ACUITY_SCORE: 53
ADLS_ACUITY_SCORE: 46
ADLS_ACUITY_SCORE: 42
ADLS_ACUITY_SCORE: 42
ADLS_ACUITY_SCORE: 47
ADLS_ACUITY_SCORE: 55
ADLS_ACUITY_SCORE: 41
ADLS_ACUITY_SCORE: 56
ADLS_ACUITY_SCORE: 44
ADLS_ACUITY_SCORE: 49
ADLS_ACUITY_SCORE: 45
ADLS_ACUITY_SCORE: 42
ADLS_ACUITY_SCORE: 57
ADLS_ACUITY_SCORE: 60
ADLS_ACUITY_SCORE: 42
ADLS_ACUITY_SCORE: 54
ADLS_ACUITY_SCORE: 47
ADLS_ACUITY_SCORE: 58
ADLS_ACUITY_SCORE: 56
ADLS_ACUITY_SCORE: 45
ADLS_ACUITY_SCORE: 42
ADLS_ACUITY_SCORE: 45
ADLS_ACUITY_SCORE: 49
ADLS_ACUITY_SCORE: 45
ADLS_ACUITY_SCORE: 47
ADLS_ACUITY_SCORE: 46
ADLS_ACUITY_SCORE: 49
ADLS_ACUITY_SCORE: 45
ADLS_ACUITY_SCORE: 46
ADLS_ACUITY_SCORE: 43
ADLS_ACUITY_SCORE: 63
ADLS_ACUITY_SCORE: 47
ADLS_ACUITY_SCORE: 41
ADLS_ACUITY_SCORE: 42
ADLS_ACUITY_SCORE: 47
ADLS_ACUITY_SCORE: 55
ADLS_ACUITY_SCORE: 53
ADLS_ACUITY_SCORE: 59
ADLS_ACUITY_SCORE: 45
ADLS_ACUITY_SCORE: 60
ADLS_ACUITY_SCORE: 49
ADLS_ACUITY_SCORE: 44
ADLS_ACUITY_SCORE: 46
ADLS_ACUITY_SCORE: 43
ADLS_ACUITY_SCORE: 46
ADLS_ACUITY_SCORE: 46
ADLS_ACUITY_SCORE: 49
ADLS_ACUITY_SCORE: 47
ADLS_ACUITY_SCORE: 43
ADLS_ACUITY_SCORE: 43
ADLS_ACUITY_SCORE: 49
ADLS_ACUITY_SCORE: 51
ADLS_ACUITY_SCORE: 43
ADLS_ACUITY_SCORE: 45
ADLS_ACUITY_SCORE: 56
ADLS_ACUITY_SCORE: 45
ADLS_ACUITY_SCORE: 46
ADLS_ACUITY_SCORE: 48
ADLS_ACUITY_SCORE: 41
ADLS_ACUITY_SCORE: 46
ADLS_ACUITY_SCORE: 49
ADLS_ACUITY_SCORE: 60
ADLS_ACUITY_SCORE: 45
ADLS_ACUITY_SCORE: 43
CONCENTRATING,_REMEMBERING_OR_MAKING_DECISIONS_DIFFICULTY: NO
ADLS_ACUITY_SCORE: 49
ADLS_ACUITY_SCORE: 43
ADLS_ACUITY_SCORE: 46
ADLS_ACUITY_SCORE: 45
ADLS_ACUITY_SCORE: 46
ADLS_ACUITY_SCORE: 45
ADLS_ACUITY_SCORE: 45
ADLS_ACUITY_SCORE: 46
ADLS_ACUITY_SCORE: 60
DRESSING/BATHING: BATHING DIFFICULTY, REQUIRES EQUIPMENT;BATHING DIFFICULTY, ASSISTANCE 1 PERSON;BATHING DIFFICULTY, DEPENDENT
ADLS_ACUITY_SCORE: 50
ADLS_ACUITY_SCORE: 45
ADLS_ACUITY_SCORE: 46
ADLS_ACUITY_SCORE: 46
ADLS_ACUITY_SCORE: 47
ADLS_ACUITY_SCORE: 44
ADLS_ACUITY_SCORE: 43
ADLS_ACUITY_SCORE: 54
ADLS_ACUITY_SCORE: 42
ADLS_ACUITY_SCORE: 43
ADLS_ACUITY_SCORE: 46
ADLS_ACUITY_SCORE: 54
ADLS_ACUITY_SCORE: 62
ADLS_ACUITY_SCORE: 42
ADLS_ACUITY_SCORE: 53
ADLS_ACUITY_SCORE: 43
ADLS_ACUITY_SCORE: 45
ADLS_ACUITY_SCORE: 46
ADLS_ACUITY_SCORE: 47
ADLS_ACUITY_SCORE: 60
ADLS_ACUITY_SCORE: 60
ADLS_ACUITY_SCORE: 43
ADLS_ACUITY_SCORE: 58
ADLS_ACUITY_SCORE: 43
ADLS_ACUITY_SCORE: 46
ADLS_ACUITY_SCORE: 44
ADLS_ACUITY_SCORE: 45
ADLS_ACUITY_SCORE: 47
ADLS_ACUITY_SCORE: 60
ADLS_ACUITY_SCORE: 58
ADLS_ACUITY_SCORE: 60
ADLS_ACUITY_SCORE: 45
ADLS_ACUITY_SCORE: 56
ADLS_ACUITY_SCORE: 62
ADLS_ACUITY_SCORE: 42
ADLS_ACUITY_SCORE: 53
ADLS_ACUITY_SCORE: 43
ADLS_ACUITY_SCORE: 46
ADLS_ACUITY_SCORE: 47
ADLS_ACUITY_SCORE: 49
ADLS_ACUITY_SCORE: 46
ADLS_ACUITY_SCORE: 46
ADLS_ACUITY_SCORE: 43
ADLS_ACUITY_SCORE: 62
ADLS_ACUITY_SCORE: 55
ADLS_ACUITY_SCORE: 42
ADLS_ACUITY_SCORE: 50
ADLS_ACUITY_SCORE: 49
ADLS_ACUITY_SCORE: 45
ADLS_ACUITY_SCORE: 42
ADLS_ACUITY_SCORE: 52
ADLS_ACUITY_SCORE: 58
ADLS_ACUITY_SCORE: 50
ADLS_ACUITY_SCORE: 34
ADLS_ACUITY_SCORE: 46
ADLS_ACUITY_SCORE: 49
ADLS_ACUITY_SCORE: 55
ADLS_ACUITY_SCORE: 54
ADLS_ACUITY_SCORE: 43
ADLS_ACUITY_SCORE: 46
ADLS_ACUITY_SCORE: 42
ADLS_ACUITY_SCORE: 46
ADLS_ACUITY_SCORE: 49
ADLS_ACUITY_SCORE: 43
ADLS_ACUITY_SCORE: 49
ADLS_ACUITY_SCORE: 64
ADLS_ACUITY_SCORE: 46
ADLS_ACUITY_SCORE: 46
ADLS_ACUITY_SCORE: 37
ADLS_ACUITY_SCORE: 42
ADLS_ACUITY_SCORE: 46
ADLS_ACUITY_SCORE: 36
ADLS_ACUITY_SCORE: 57
ADLS_ACUITY_SCORE: 45
ADLS_ACUITY_SCORE: 43
ADLS_ACUITY_SCORE: 45
ADLS_ACUITY_SCORE: 45
ADLS_ACUITY_SCORE: 60
ADLS_ACUITY_SCORE: 45
ADLS_ACUITY_SCORE: 47
ADLS_ACUITY_SCORE: 49
ADLS_ACUITY_SCORE: 47
ADLS_ACUITY_SCORE: 43
ADLS_ACUITY_SCORE: 54
ADLS_ACUITY_SCORE: 56
ADLS_ACUITY_SCORE: 42
ADLS_ACUITY_SCORE: 46
ADLS_ACUITY_SCORE: 60
ADLS_ACUITY_SCORE: 51
ADLS_ACUITY_SCORE: 54
ADLS_ACUITY_SCORE: 58
ADLS_ACUITY_SCORE: 43
ADLS_ACUITY_SCORE: 43
ADLS_ACUITY_SCORE: 42
ADLS_ACUITY_SCORE: 59
ADLS_ACUITY_SCORE: 44
ADLS_ACUITY_SCORE: 45
ADLS_ACUITY_SCORE: 43
ADLS_ACUITY_SCORE: 45
WALKING_OR_CLIMBING_STAIRS: TRANSFERRING DIFFICULTY, DEPENDENT
ADLS_ACUITY_SCORE: 46
ADLS_ACUITY_SCORE: 45
ADLS_ACUITY_SCORE: 47
ADLS_ACUITY_SCORE: 43
ADLS_ACUITY_SCORE: 59
ADLS_ACUITY_SCORE: 49
ADLS_ACUITY_SCORE: 56
ADLS_ACUITY_SCORE: 59
ADLS_ACUITY_SCORE: 46
ADLS_ACUITY_SCORE: 45
ADLS_ACUITY_SCORE: 45
ADLS_ACUITY_SCORE: 49
ADLS_ACUITY_SCORE: 54
ADLS_ACUITY_SCORE: 53
ADLS_ACUITY_SCORE: 58
ADLS_ACUITY_SCORE: 46
ADLS_ACUITY_SCORE: 46
ADLS_ACUITY_SCORE: 42
ADLS_ACUITY_SCORE: 45
ADLS_ACUITY_SCORE: 47
ADLS_ACUITY_SCORE: 46
ADLS_ACUITY_SCORE: 44
ADLS_ACUITY_SCORE: 56
ADLS_ACUITY_SCORE: 44
ADLS_ACUITY_SCORE: 46
ADLS_ACUITY_SCORE: 51
ADLS_ACUITY_SCORE: 49
ADLS_ACUITY_SCORE: 44
ADLS_ACUITY_SCORE: 44
ADLS_ACUITY_SCORE: 45
ADLS_ACUITY_SCORE: 62
ADLS_ACUITY_SCORE: 44
ADLS_ACUITY_SCORE: 45
ADLS_ACUITY_SCORE: 44
ADLS_ACUITY_SCORE: 47
ADLS_ACUITY_SCORE: 49
ADLS_ACUITY_SCORE: 47
ADLS_ACUITY_SCORE: 54
ADLS_ACUITY_SCORE: 56
TOILETING: 1-->ASSISTANCE (EQUIPMENT/PERSON) NEEDED
ADLS_ACUITY_SCORE: 49
ADLS_ACUITY_SCORE: 44
ADLS_ACUITY_SCORE: 47
TOILETING_ASSISTANCE: TOILETING DIFFICULTY, ASSISTANCE 1 PERSON
ADLS_ACUITY_SCORE: 44
ADLS_ACUITY_SCORE: 49
ADLS_ACUITY_SCORE: 44
ADLS_ACUITY_SCORE: 42
ADLS_ACUITY_SCORE: 52
ADLS_ACUITY_SCORE: 42
ADLS_ACUITY_SCORE: 43
ADLS_ACUITY_SCORE: 46
ADLS_ACUITY_SCORE: 54
ADLS_ACUITY_SCORE: 59
ADLS_ACUITY_SCORE: 45
ADLS_ACUITY_SCORE: 43
ADLS_ACUITY_SCORE: 54
ADLS_ACUITY_SCORE: 45
ADLS_ACUITY_SCORE: 60
ADLS_ACUITY_SCORE: 46
ADLS_ACUITY_SCORE: 54
ADLS_ACUITY_SCORE: 45
ADLS_ACUITY_SCORE: 52
ADLS_ACUITY_SCORE: 43
CHANGE_IN_FUNCTIONAL_STATUS_SINCE_ONSET_OF_CURRENT_ILLNESS/INJURY: YES
ADLS_ACUITY_SCORE: 46
ADLS_ACUITY_SCORE: 46
DRESS: 0-->ASSISTANCE NEEDED (DEVELOPMENTALLY APPROPRIATE)
ADLS_ACUITY_SCORE: 52
ADLS_ACUITY_SCORE: 49
ADLS_ACUITY_SCORE: 49
ADLS_ACUITY_SCORE: 47
ADLS_ACUITY_SCORE: 52
ADLS_ACUITY_SCORE: 62
ADLS_ACUITY_SCORE: 47
ADLS_ACUITY_SCORE: 54
ADLS_ACUITY_SCORE: 43
ADLS_ACUITY_SCORE: 46
ADLS_ACUITY_SCORE: 42
ADLS_ACUITY_SCORE: 44
ADLS_ACUITY_SCORE: 52
ADLS_ACUITY_SCORE: 46
ADLS_ACUITY_SCORE: 49
ADLS_ACUITY_SCORE: 43
ADLS_ACUITY_SCORE: 47
ADLS_ACUITY_SCORE: 50
ADLS_ACUITY_SCORE: 42
ADLS_ACUITY_SCORE: 45
ADLS_ACUITY_SCORE: 50
ADLS_ACUITY_SCORE: 45
DRESSING/BATHING_DIFFICULTY: YES
ADLS_ACUITY_SCORE: 44
ADLS_ACUITY_SCORE: 53
ADLS_ACUITY_SCORE: 44
ADLS_ACUITY_SCORE: 45
ADLS_ACUITY_SCORE: 46
ADLS_ACUITY_SCORE: 56
ADLS_ACUITY_SCORE: 54
ADLS_ACUITY_SCORE: 42
ADLS_ACUITY_SCORE: 50
ADLS_ACUITY_SCORE: 43
ADLS_ACUITY_SCORE: 49
ADLS_ACUITY_SCORE: 45
ADLS_ACUITY_SCORE: 49
ADLS_ACUITY_SCORE: 45
ADLS_ACUITY_SCORE: 53
ADLS_ACUITY_SCORE: 46
ADLS_ACUITY_SCORE: 56
ADLS_ACUITY_SCORE: 46
ADLS_ACUITY_SCORE: 42
ADLS_ACUITY_SCORE: 44
ADLS_ACUITY_SCORE: 47
ADLS_ACUITY_SCORE: 43
ADLS_ACUITY_SCORE: 64
ADLS_ACUITY_SCORE: 46
ADLS_ACUITY_SCORE: 56
ADLS_ACUITY_SCORE: 60
ADLS_ACUITY_SCORE: 56
ADLS_ACUITY_SCORE: 62
ADLS_ACUITY_SCORE: 46
ADLS_ACUITY_SCORE: 42
ADLS_ACUITY_SCORE: 60
ADLS_ACUITY_SCORE: 46
ADLS_ACUITY_SCORE: 46
ADLS_ACUITY_SCORE: 56
ADLS_ACUITY_SCORE: 44
ADLS_ACUITY_SCORE: 58
ADLS_ACUITY_SCORE: 44
ADLS_ACUITY_SCORE: 42
ADLS_ACUITY_SCORE: 44
ADLS_ACUITY_SCORE: 45
ADLS_ACUITY_SCORE: 46
ADLS_ACUITY_SCORE: 46
ADLS_ACUITY_SCORE: 43
ADLS_ACUITY_SCORE: 43
ADLS_ACUITY_SCORE: 45
ADLS_ACUITY_SCORE: 49
ADLS_ACUITY_SCORE: 46
ADLS_ACUITY_SCORE: 45
ADLS_ACUITY_SCORE: 49
ADLS_ACUITY_SCORE: 43
ADLS_ACUITY_SCORE: 49
ADLS_ACUITY_SCORE: 46
ADLS_ACUITY_SCORE: 43
ADLS_ACUITY_SCORE: 62
ADLS_ACUITY_SCORE: 43
ADLS_ACUITY_SCORE: 49
ADLS_ACUITY_SCORE: 62
ADLS_ACUITY_SCORE: 46
ADLS_ACUITY_SCORE: 56
TOILETING: 1-->ASSISTANCE (EQUIPMENT/PERSON) NEEDED
ADLS_ACUITY_SCORE: 62
ADLS_ACUITY_SCORE: 48
ADLS_ACUITY_SCORE: 47
ADLS_ACUITY_SCORE: 62
ADLS_ACUITY_SCORE: 45
ADLS_ACUITY_SCORE: 49
ADLS_ACUITY_SCORE: 44
ADLS_ACUITY_SCORE: 46
ADLS_ACUITY_SCORE: 53
DRESSING/BATHING_DIFFICULTY: YES
ADLS_ACUITY_SCORE: 44
ADLS_ACUITY_SCORE: 64
ADLS_ACUITY_SCORE: 45
ADLS_ACUITY_SCORE: 46
ADLS_ACUITY_SCORE: 60
ADLS_ACUITY_SCORE: 46
ADLS_ACUITY_SCORE: 60
ADLS_ACUITY_SCORE: 46
ADLS_ACUITY_SCORE: 52
ADLS_ACUITY_SCORE: 52
ADLS_ACUITY_SCORE: 42
ADLS_ACUITY_SCORE: 65
ADLS_ACUITY_SCORE: 42
ADLS_ACUITY_SCORE: 37
ADLS_ACUITY_SCORE: 49
ADLS_ACUITY_SCORE: 46
ADLS_ACUITY_SCORE: 60
ADLS_ACUITY_SCORE: 43
ADLS_ACUITY_SCORE: 44
ADLS_ACUITY_SCORE: 54
ADLS_ACUITY_SCORE: 45
ADLS_ACUITY_SCORE: 55
ADLS_ACUITY_SCORE: 57
ADLS_ACUITY_SCORE: 45
ADLS_ACUITY_SCORE: 46
ADLS_ACUITY_SCORE: 42
ADLS_ACUITY_SCORE: 52
ADLS_ACUITY_SCORE: 46
ADLS_ACUITY_SCORE: 55
ADLS_ACUITY_SCORE: 60
ADLS_ACUITY_SCORE: 60
ADLS_ACUITY_SCORE: 62
ADLS_ACUITY_SCORE: 42
ADLS_ACUITY_SCORE: 48
ADLS_ACUITY_SCORE: 42
ADLS_ACUITY_SCORE: 56
ADLS_ACUITY_SCORE: 64
DRESS: 1-->ASSISTANCE (EQUIPMENT/PERSON) NEEDED
ADLS_ACUITY_SCORE: 43
ADLS_ACUITY_SCORE: 42
ADLS_ACUITY_SCORE: 42
ADLS_ACUITY_SCORE: 60
ADLS_ACUITY_SCORE: 42
ADLS_ACUITY_SCORE: 46
ADLS_ACUITY_SCORE: 43
ADLS_ACUITY_SCORE: 54
ADLS_ACUITY_SCORE: 56
ADLS_ACUITY_SCORE: 44
ADLS_ACUITY_SCORE: 42
ADLS_ACUITY_SCORE: 49
ADLS_ACUITY_SCORE: 62
ADLS_ACUITY_SCORE: 59
ADLS_ACUITY_SCORE: 47
ADLS_ACUITY_SCORE: 47
ADLS_ACUITY_SCORE: 59
ADLS_ACUITY_SCORE: 44
ADLS_ACUITY_SCORE: 47
ADLS_ACUITY_SCORE: 34
ADLS_ACUITY_SCORE: 46
ADLS_ACUITY_SCORE: 49
ADLS_ACUITY_SCORE: 52
ADLS_ACUITY_SCORE: 49
ADLS_ACUITY_SCORE: 46
ADLS_ACUITY_SCORE: 56
ADLS_ACUITY_SCORE: 50
ADLS_ACUITY_SCORE: 45
ADLS_ACUITY_SCORE: 58
ADLS_ACUITY_SCORE: 46
ADLS_ACUITY_SCORE: 56
ADLS_ACUITY_SCORE: 54
ADLS_ACUITY_SCORE: 60
ADLS_ACUITY_SCORE: 49
ADLS_ACUITY_SCORE: 47
ADLS_ACUITY_SCORE: 46
ADLS_ACUITY_SCORE: 44
ADLS_ACUITY_SCORE: 42
ADLS_ACUITY_SCORE: 49
ADLS_ACUITY_SCORE: 45
ADLS_ACUITY_SCORE: 49
ADLS_ACUITY_SCORE: 45
ADLS_ACUITY_SCORE: 54
ADLS_ACUITY_SCORE: 45
ADLS_ACUITY_SCORE: 54
ADLS_ACUITY_SCORE: 51
ADLS_ACUITY_SCORE: 49
ADLS_ACUITY_SCORE: 42
ADLS_ACUITY_SCORE: 43
ADLS_ACUITY_SCORE: 44
ADLS_ACUITY_SCORE: 56
ADLS_ACUITY_SCORE: 62
ADLS_ACUITY_SCORE: 53
ADLS_ACUITY_SCORE: 53
ADLS_ACUITY_SCORE: 60
ADLS_ACUITY_SCORE: 58
ADLS_ACUITY_SCORE: 45
ADLS_ACUITY_SCORE: 44
ADLS_ACUITY_SCORE: 49
ADLS_ACUITY_SCORE: 46
ADLS_ACUITY_SCORE: 47
ADLS_ACUITY_SCORE: 46
ADLS_ACUITY_SCORE: 53
WALKING_OR_CLIMBING_STAIRS_DIFFICULTY: YES
ADLS_ACUITY_SCORE: 46
ADLS_ACUITY_SCORE: 43
ADLS_ACUITY_SCORE: 44
ADLS_ACUITY_SCORE: 64
ADLS_ACUITY_SCORE: 47
ADLS_ACUITY_SCORE: 46
ADLS_ACUITY_SCORE: 62
ADLS_ACUITY_SCORE: 47
ADLS_ACUITY_SCORE: 47
ADLS_ACUITY_SCORE: 43
ADLS_ACUITY_SCORE: 48
ADLS_ACUITY_SCORE: 46
ADLS_ACUITY_SCORE: 42
ADLS_ACUITY_SCORE: 44
ADLS_ACUITY_SCORE: 46
ADLS_ACUITY_SCORE: 42
ADLS_ACUITY_SCORE: 42
ADLS_ACUITY_SCORE: 50
ADLS_ACUITY_SCORE: 42
ADLS_ACUITY_SCORE: 43
ADLS_ACUITY_SCORE: 42
ADLS_ACUITY_SCORE: 60
ADLS_ACUITY_SCORE: 52
ADLS_ACUITY_SCORE: 45
ADLS_ACUITY_SCORE: 47
ADLS_ACUITY_SCORE: 43
ADLS_ACUITY_SCORE: 43
ADLS_ACUITY_SCORE: 56
ADLS_ACUITY_SCORE: 54
ADLS_ACUITY_SCORE: 37
ADLS_ACUITY_SCORE: 52
ADLS_ACUITY_SCORE: 56
ADLS_ACUITY_SCORE: 46
ADLS_ACUITY_SCORE: 45
ADLS_ACUITY_SCORE: 41
ADLS_ACUITY_SCORE: 62
ADLS_ACUITY_SCORE: 42
ADLS_ACUITY_SCORE: 45
ADLS_ACUITY_SCORE: 42
ADLS_ACUITY_SCORE: 43
ADLS_ACUITY_SCORE: 43
ADLS_ACUITY_SCORE: 54
ADLS_ACUITY_SCORE: 52
ADLS_ACUITY_SCORE: 56
ADLS_ACUITY_SCORE: 45
ADLS_ACUITY_SCORE: 42
ADLS_ACUITY_SCORE: 59
ADLS_ACUITY_SCORE: 42
ADLS_ACUITY_SCORE: 47
ADLS_ACUITY_SCORE: 55
ADLS_ACUITY_SCORE: 43
ADLS_ACUITY_SCORE: 54
ADLS_ACUITY_SCORE: 41
ADLS_ACUITY_SCORE: 46
ADLS_ACUITY_SCORE: 45
ADLS_ACUITY_SCORE: 52
ADLS_ACUITY_SCORE: 43
ADLS_ACUITY_SCORE: 49
ADLS_ACUITY_SCORE: 60
ADLS_ACUITY_SCORE: 43
ADLS_ACUITY_SCORE: 34
ADLS_ACUITY_SCORE: 51
ADLS_ACUITY_SCORE: 44
ADLS_ACUITY_SCORE: 59
ADLS_ACUITY_SCORE: 46
ADLS_ACUITY_SCORE: 42
ADLS_ACUITY_SCORE: 49
ADLS_ACUITY_SCORE: 42
ADLS_ACUITY_SCORE: 60
ADLS_ACUITY_SCORE: 43
ADLS_ACUITY_SCORE: 46
ADLS_ACUITY_SCORE: 48
ADLS_ACUITY_SCORE: 45
ADLS_ACUITY_SCORE: 62
ADLS_ACUITY_SCORE: 46
ADLS_ACUITY_SCORE: 56
ADLS_ACUITY_SCORE: 43
ADLS_ACUITY_SCORE: 51
ADLS_ACUITY_SCORE: 44
ADLS_ACUITY_SCORE: 51
ADLS_ACUITY_SCORE: 42
ADLS_ACUITY_SCORE: 52
ADLS_ACUITY_SCORE: 45
ADLS_ACUITY_SCORE: 43
ADLS_ACUITY_SCORE: 52
ADLS_ACUITY_SCORE: 43
ADLS_ACUITY_SCORE: 60
ADLS_ACUITY_SCORE: 44
ADLS_ACUITY_SCORE: 47
ADLS_ACUITY_SCORE: 47
ADLS_ACUITY_SCORE: 45
ADLS_ACUITY_SCORE: 46
ADLS_ACUITY_SCORE: 55
ADLS_ACUITY_SCORE: 43
ADLS_ACUITY_SCORE: 60
ADLS_ACUITY_SCORE: 46
TOILETING_ISSUES: NO
ADLS_ACUITY_SCORE: 46
ADLS_ACUITY_SCORE: 56
ADLS_ACUITY_SCORE: 62
ADLS_ACUITY_SCORE: 43
ADLS_ACUITY_SCORE: 49
ADLS_ACUITY_SCORE: 61
ADLS_ACUITY_SCORE: 41
ADLS_ACUITY_SCORE: 45
ADLS_ACUITY_SCORE: 47
ADLS_ACUITY_SCORE: 56
ADLS_ACUITY_SCORE: 47
ADLS_ACUITY_SCORE: 54
ADLS_ACUITY_SCORE: 43
TOILETING: 1-->ASSISTANCE (EQUIPMENT/PERSON) NEEDED (NOT DEVELOPMENTALLY APPROPRIATE)
ADLS_ACUITY_SCORE: 56
ADLS_ACUITY_SCORE: 58
ADLS_ACUITY_SCORE: 43
ADLS_ACUITY_SCORE: 52
ADLS_ACUITY_SCORE: 48
DRESSING/BATHING: BATHING DIFFICULTY, REQUIRES EQUIPMENT;BATHING DIFFICULTY, ASSISTANCE 1 PERSON;DRESSING DIFFICULTY, ASSISTANCE 1 PERSON
ADLS_ACUITY_SCORE: 43
ADLS_ACUITY_SCORE: 50
ADLS_ACUITY_SCORE: 49
ADLS_ACUITY_SCORE: 46
ADLS_ACUITY_SCORE: 42
ADLS_ACUITY_SCORE: 42
ADLS_ACUITY_SCORE: 65
ADLS_ACUITY_SCORE: 56
ADLS_ACUITY_SCORE: 45
ADLS_ACUITY_SCORE: 44
ADLS_ACUITY_SCORE: 58
ADLS_ACUITY_SCORE: 60
CHANGE_IN_FUNCTIONAL_STATUS_SINCE_ONSET_OF_CURRENT_ILLNESS/INJURY: YES
ADLS_ACUITY_SCORE: 44
ADLS_ACUITY_SCORE: 50
ADLS_ACUITY_SCORE: 49
ADLS_ACUITY_SCORE: 49
ADLS_ACUITY_SCORE: 44
ADLS_ACUITY_SCORE: 46
ADLS_ACUITY_SCORE: 42
ADLS_ACUITY_SCORE: 45
ADLS_ACUITY_SCORE: 43
ADLS_ACUITY_SCORE: 44
ADLS_ACUITY_SCORE: 46
ADLS_ACUITY_SCORE: 54
ADLS_ACUITY_SCORE: 45
ADLS_ACUITY_SCORE: 52
ADLS_ACUITY_SCORE: 58
ADLS_ACUITY_SCORE: 46
ADLS_ACUITY_SCORE: 47
ADLS_ACUITY_SCORE: 42
ADLS_ACUITY_SCORE: 64
ADLS_ACUITY_SCORE: 49
ADLS_ACUITY_SCORE: 47
ADLS_ACUITY_SCORE: 48
ADLS_ACUITY_SCORE: 45
ADLS_ACUITY_SCORE: 42
ADLS_ACUITY_SCORE: 56
ADLS_ACUITY_SCORE: 62
ADLS_ACUITY_SCORE: 45
ADLS_ACUITY_SCORE: 54
ADLS_ACUITY_SCORE: 42
ADLS_ACUITY_SCORE: 49
ADLS_ACUITY_SCORE: 48
ADLS_ACUITY_SCORE: 42
ADLS_ACUITY_SCORE: 46
ADLS_ACUITY_SCORE: 43
ADLS_ACUITY_SCORE: 47
ADLS_ACUITY_SCORE: 47
ADLS_ACUITY_SCORE: 52
ADLS_ACUITY_SCORE: 60
ADLS_ACUITY_SCORE: 42
ADLS_ACUITY_SCORE: 47
ADLS_ACUITY_SCORE: 64
ADLS_ACUITY_SCORE: 54
ADLS_ACUITY_SCORE: 45
ADLS_ACUITY_SCORE: 47
ADLS_ACUITY_SCORE: 43
ADLS_ACUITY_SCORE: 64
ADLS_ACUITY_SCORE: 48
ADLS_ACUITY_SCORE: 34
ADLS_ACUITY_SCORE: 56
ADLS_ACUITY_SCORE: 48
ADLS_ACUITY_SCORE: 46
DRESS: 1-->ASSISTANCE (EQUIPMENT/PERSON) NEEDED (NOT DEVELOPMENTALLY APPROPRIATE)
DRESSING/BATHING_DIFFICULTY: YES
ADLS_ACUITY_SCORE: 31
WALKING_OR_CLIMBING_STAIRS: TRANSFERRING DIFFICULTY, DEPENDENT
ADLS_ACUITY_SCORE: 44
ADLS_ACUITY_SCORE: 48
ADLS_ACUITY_SCORE: 43
ADLS_ACUITY_SCORE: 45
CONCENTRATING,_REMEMBERING_OR_MAKING_DECISIONS_DIFFICULTY: NO
ADLS_ACUITY_SCORE: 46
ADLS_ACUITY_SCORE: 46
ADLS_ACUITY_SCORE: 43
ADLS_ACUITY_SCORE: 54
ADLS_ACUITY_SCORE: 42
ADLS_ACUITY_SCORE: 42
ADLS_ACUITY_SCORE: 37
ADLS_ACUITY_SCORE: 44
ADLS_ACUITY_SCORE: 43
ADLS_ACUITY_SCORE: 54
ADLS_ACUITY_SCORE: 49
ADLS_ACUITY_SCORE: 45
ADLS_ACUITY_SCORE: 46
ADLS_ACUITY_SCORE: 65
ADLS_ACUITY_SCORE: 45
ADLS_ACUITY_SCORE: 49
ADLS_ACUITY_SCORE: 45
ADLS_ACUITY_SCORE: 60
ADLS_ACUITY_SCORE: 42
ADLS_ACUITY_SCORE: 43
ADLS_ACUITY_SCORE: 45
ADLS_ACUITY_SCORE: 42
ADLS_ACUITY_SCORE: 62
ADLS_ACUITY_SCORE: 44
ADLS_ACUITY_SCORE: 48
ADLS_ACUITY_SCORE: 47
ADLS_ACUITY_SCORE: 43
ADLS_ACUITY_SCORE: 44
ADLS_ACUITY_SCORE: 53
ADLS_ACUITY_SCORE: 42
ADLS_ACUITY_SCORE: 45
BATHING: 1-->ASSISTANCE NEEDED
ADLS_ACUITY_SCORE: 43
ADLS_ACUITY_SCORE: 45
ADLS_ACUITY_SCORE: 37
ADLS_ACUITY_SCORE: 45
ADLS_ACUITY_SCORE: 42
ADLS_ACUITY_SCORE: 56
ADLS_ACUITY_SCORE: 45
ADLS_ACUITY_SCORE: 55
ADLS_ACUITY_SCORE: 46
ADLS_ACUITY_SCORE: 62
ADLS_ACUITY_SCORE: 48
ADL_MEAN: 3.76
ADLS_ACUITY_SCORE: 42
ADLS_ACUITY_SCORE: 49
ADLS_ACUITY_SCORE: 56
ADLS_ACUITY_SCORE: 56
ADLS_ACUITY_SCORE: 42
ADLS_ACUITY_SCORE: 46
ADLS_ACUITY_SCORE: 44
ADLS_ACUITY_SCORE: 53
ADLS_ACUITY_SCORE: 49
ADLS_ACUITY_SCORE: 48
ADLS_ACUITY_SCORE: 60
ADLS_ACUITY_SCORE: 46
ADLS_ACUITY_SCORE: 48
ADLS_ACUITY_SCORE: 62
ADLS_ACUITY_SCORE: 47
ADLS_ACUITY_SCORE: 54
ADLS_ACUITY_SCORE: 45
ADLS_ACUITY_SCORE: 44
ADLS_ACUITY_SCORE: 59
ADLS_ACUITY_SCORE: 43
ADLS_ACUITY_SCORE: 49
ADLS_ACUITY_SCORE: 49
ADLS_ACUITY_SCORE: 55
ADLS_ACUITY_SCORE: 46
ADLS_ACUITY_SCORE: 44
ADLS_ACUITY_SCORE: 44
ADLS_ACUITY_SCORE: 43
ADLS_ACUITY_SCORE: 48
ADLS_ACUITY_SCORE: 47
ADLS_ACUITY_SCORE: 45
ADLS_ACUITY_SCORE: 59
ADLS_ACUITY_SCORE: 45
ADLS_ACUITY_SCORE: 55
ADLS_ACUITY_SCORE: 56
ADLS_ACUITY_SCORE: 58
ADLS_ACUITY_SCORE: 56
ADLS_ACUITY_SCORE: 43
ADLS_ACUITY_SCORE: 47
ADLS_ACUITY_SCORE: 42
ADLS_ACUITY_SCORE: 42
ADLS_ACUITY_SCORE: 45
ADLS_ACUITY_SCORE: 64
ADLS_ACUITY_SCORE: 47
ADLS_ACUITY_SCORE: 45
ADLS_ACUITY_SCORE: 42
ADLS_ACUITY_SCORE: 44
ADLS_ACUITY_SCORE: 46
ADLS_ACUITY_SCORE: 53
ADLS_ACUITY_SCORE: 60
ADLS_ACUITY_SCORE: 55
ADLS_ACUITY_SCORE: 49
ADLS_ACUITY_SCORE: 43
ADLS_ACUITY_SCORE: 54
ADLS_ACUITY_SCORE: 46
ADLS_ACUITY_SCORE: 54
ADLS_ACUITY_SCORE: 43
ADLS_ACUITY_SCORE: 53
ADLS_ACUITY_SCORE: 60
ADLS_ACUITY_SCORE: 48
ADLS_ACUITY_SCORE: 42
ADLS_ACUITY_SCORE: 42
ADLS_ACUITY_SCORE: 43
ADLS_ACUITY_SCORE: 62
ADLS_ACUITY_SCORE: 43
ADLS_ACUITY_SCORE: 55
ADLS_ACUITY_SCORE: 43
ADLS_ACUITY_SCORE: 43
ADLS_ACUITY_SCORE: 49
ADLS_ACUITY_SCORE: 44
ADLS_ACUITY_SCORE: 43
ADLS_ACUITY_SCORE: 43
ADLS_ACUITY_SCORE: 47
ADLS_ACUITY_SCORE: 42
ADLS_ACUITY_SCORE: 54
ADLS_ACUITY_SCORE: 42
ADLS_ACUITY_SCORE: 56
ADLS_ACUITY_SCORE: 54
ADLS_ACUITY_SCORE: 60
ADLS_ACUITY_SCORE: 55
ADLS_ACUITY_SCORE: 49
ADLS_ACUITY_SCORE: 54
ADLS_ACUITY_SCORE: 55
ADLS_ACUITY_SCORE: 58
ADLS_ACUITY_SCORE: 43
ADLS_ACUITY_SCORE: 47
ADLS_ACUITY_SCORE: 47
ADLS_ACUITY_SCORE: 54
ADLS_ACUITY_SCORE: 43
ADLS_ACUITY_SCORE: 37
ADLS_ACUITY_SCORE: 60
ADLS_ACUITY_SCORE: 47
ADLS_ACUITY_SCORE: 44
ADLS_ACUITY_SCORE: 45
ADLS_ACUITY_SCORE: 46
ADLS_ACUITY_SCORE: 51
ADLS_ACUITY_SCORE: 44
ADLS_ACUITY_SCORE: 44
ADLS_ACUITY_SCORE: 46
ADLS_ACUITY_SCORE: 47
ADLS_ACUITY_SCORE: 60
ADLS_ACUITY_SCORE: 45
ADLS_ACUITY_SCORE: 54
ADLS_ACUITY_SCORE: 51
ADLS_ACUITY_SCORE: 44
ADLS_ACUITY_SCORE: 43
ADLS_ACUITY_SCORE: 59
ADLS_ACUITY_SCORE: 43
ADLS_ACUITY_SCORE: 46
ADLS_ACUITY_SCORE: 49
ADLS_ACUITY_SCORE: 49
ADLS_ACUITY_SCORE: 34
ADLS_ACUITY_SCORE: 60
DOING_ERRANDS_INDEPENDENTLY_DIFFICULTY: YES
ADLS_ACUITY_SCORE: 41
ADLS_ACUITY_SCORE: 47
ADLS_ACUITY_SCORE: 47
ADLS_ACUITY_SCORE: 46
ADLS_ACUITY_SCORE: 45
ADLS_ACUITY_SCORE: 59
ADLS_ACUITY_SCORE: 47
ADLS_ACUITY_SCORE: 40
ADLS_ACUITY_SCORE: 43
ADLS_ACUITY_SCORE: 42
DIFFICULTY_COMMUNICATING: NO
ADLS_ACUITY_SCORE: 52
ADLS_ACUITY_SCORE: 46
ADLS_ACUITY_SCORE: 44
ADLS_ACUITY_SCORE: 49
ADLS_ACUITY_SCORE: 43
ADLS_ACUITY_SCORE: 56
ADLS_ACUITY_SCORE: 44
ADLS_ACUITY_SCORE: 35
ADLS_ACUITY_SCORE: 42
ADLS_ACUITY_SCORE: 46
ADLS_ACUITY_SCORE: 48
ADLS_ACUITY_SCORE: 45
ADLS_ACUITY_SCORE: 43
ADLS_ACUITY_SCORE: 46
ADLS_ACUITY_SCORE: 45
ADLS_ACUITY_SCORE: 43
ADLS_ACUITY_SCORE: 47
ADLS_ACUITY_SCORE: 51
ADLS_ACUITY_SCORE: 49
ADLS_ACUITY_SCORE: 43
ADLS_ACUITY_SCORE: 56
ADLS_ACUITY_SCORE: 42
ADLS_ACUITY_SCORE: 52
ADLS_ACUITY_SCORE: 49
ADLS_ACUITY_SCORE: 62
ADLS_ACUITY_SCORE: 62
TOILETING: 1-->ASSISTANCE (EQUIPMENT/PERSON) NEEDED (NOT DEVELOPMENTALLY APPROPRIATE)
ADLS_ACUITY_SCORE: 60
ADLS_ACUITY_SCORE: 50
ADLS_ACUITY_SCORE: 43
ADLS_ACUITY_SCORE: 46
ADLS_ACUITY_SCORE: 43
ADLS_ACUITY_SCORE: 60
ADLS_ACUITY_SCORE: 62
ADLS_ACUITY_SCORE: 43
ADLS_ACUITY_SCORE: 47
ADLS_ACUITY_SCORE: 64
ADLS_ACUITY_SCORE: 42
ADLS_ACUITY_SCORE: 64
ADLS_ACUITY_SCORE: 49
ADLS_ACUITY_SCORE: 43
ADLS_ACUITY_SCORE: 47
ADLS_ACUITY_SCORE: 46
ADLS_ACUITY_SCORE: 60
ADLS_ACUITY_SCORE: 41
ADLS_ACUITY_SCORE: 60
ADLS_ACUITY_SCORE: 44
ADLS_ACUITY_SCORE: 46
ADLS_ACUITY_SCORE: 42
ADLS_ACUITY_SCORE: 46
ADLS_ACUITY_SCORE: 49
ADLS_ACUITY_SCORE: 46
ADLS_ACUITY_SCORE: 46
DIFFICULTY_EATING/SWALLOWING: NO
ADLS_ACUITY_SCORE: 43
ADLS_ACUITY_SCORE: 53
ADLS_ACUITY_SCORE: 58
ADLS_ACUITY_SCORE: 45
ADLS_ACUITY_SCORE: 42
ADLS_ACUITY_SCORE: 56
ADLS_ACUITY_SCORE: 42
ADLS_ACUITY_SCORE: 48
ADLS_ACUITY_SCORE: 43
ADLS_ACUITY_SCORE: 56
ADLS_ACUITY_SCORE: 46
ADLS_ACUITY_SCORE: 44
ADLS_ACUITY_SCORE: 48
ADLS_ACUITY_SCORE: 47
ADLS_ACUITY_SCORE: 59
ADLS_ACUITY_SCORE: 46
ADLS_ACUITY_SCORE: 55
ADLS_ACUITY_SCORE: 49
ADLS_ACUITY_SCORE: 42
ADLS_ACUITY_SCORE: 49
ADLS_ACUITY_SCORE: 45
ADLS_ACUITY_SCORE: 44
ADLS_ACUITY_SCORE: 49
ADLS_ACUITY_SCORE: 56
ADLS_ACUITY_SCORE: 49
ADLS_ACUITY_SCORE: 52
ADLS_ACUITY_SCORE: 45
ADLS_ACUITY_SCORE: 42
ADLS_ACUITY_SCORE: 46
ADLS_ACUITY_SCORE: 44
ADLS_ACUITY_SCORE: 58
ADLS_ACUITY_SCORE: 61
ADLS_ACUITY_SCORE: 42
ADLS_ACUITY_SCORE: 53
ADLS_ACUITY_SCORE: 55
DIFFICULTY_EATING/SWALLOWING: NO
ADLS_ACUITY_SCORE: 52
ADLS_ACUITY_SCORE: 49
ADLS_ACUITY_SCORE: 43
ADLS_ACUITY_SCORE: 43
ADLS_ACUITY_SCORE: 47
ADLS_ACUITY_SCORE: 49
ADLS_ACUITY_SCORE: 56
ADLS_ACUITY_SCORE: 44
ADLS_ACUITY_SCORE: 35
ADLS_ACUITY_SCORE: 54
ADLS_ACUITY_SCORE: 43
ADLS_ACUITY_SCORE: 59
ADLS_ACUITY_SCORE: 59
ADLS_ACUITY_SCORE: 45
ADLS_ACUITY_SCORE: 49
ADLS_ACUITY_SCORE: 47
ADLS_ACUITY_SCORE: 46
ADLS_ACUITY_SCORE: 43
ADLS_ACUITY_SCORE: 46
TRANSFERRING: 1-->ASSISTANCE (EQUIPMENT/PERSON) NEEDED (NOT DEVELOPMENTALLY APPROPRIATE)
ADLS_ACUITY_SCORE: 44
ADLS_ACUITY_SCORE: 42
ADLS_ACUITY_SCORE: 48
ADLS_ACUITY_SCORE: 44
ADLS_ACUITY_SCORE: 65
ADLS_ACUITY_SCORE: 49
ADLS_ACUITY_SCORE: 45
ADLS_ACUITY_SCORE: 55
ADLS_ACUITY_SCORE: 42
ADLS_ACUITY_SCORE: 42
ADLS_ACUITY_SCORE: 45
ADLS_ACUITY_SCORE: 54
ADLS_ACUITY_SCORE: 44
ADLS_ACUITY_SCORE: 56
ADLS_ACUITY_SCORE: 46
ADLS_ACUITY_SCORE: 45
ADLS_ACUITY_SCORE: 44
ADLS_ACUITY_SCORE: 41
ADLS_ACUITY_SCORE: 45
ADLS_ACUITY_SCORE: 52
BATHING: 1-->ASSISTANCE NEEDED
ADL_SUM: 64
ADLS_ACUITY_SCORE: 43
TOILETING_ASSISTANCE: TOILETING DIFFICULTY, ASSISTANCE 1 PERSON
ADLS_ACUITY_SCORE: 46
ADLS_ACUITY_SCORE: 43
ADLS_ACUITY_SCORE: 43
ADLS_ACUITY_SCORE: 48
ADLS_ACUITY_SCORE: 46
ADLS_ACUITY_SCORE: 46
ADLS_ACUITY_SCORE: 60
ADLS_ACUITY_SCORE: 44
ADLS_ACUITY_SCORE: 43
ADLS_ACUITY_SCORE: 47
ADLS_ACUITY_SCORE: 45
ADLS_ACUITY_SCORE: 46
ADLS_ACUITY_SCORE: 55
ADLS_ACUITY_SCORE: 42
ADLS_ACUITY_SCORE: 65
ADLS_ACUITY_SCORE: 48
ADLS_ACUITY_SCORE: 45
ADLS_ACUITY_SCORE: 46
ADLS_ACUITY_SCORE: 43
ADLS_ACUITY_SCORE: 54
ADLS_ACUITY_SCORE: 56
ADLS_ACUITY_SCORE: 52
ADLS_ACUITY_SCORE: 52
ADLS_ACUITY_SCORE: 49
ADLS_ACUITY_SCORE: 42
ADLS_ACUITY_SCORE: 47
ADLS_ACUITY_SCORE: 56
ADLS_ACUITY_SCORE: 43
ADLS_ACUITY_SCORE: 59
ADLS_ACUITY_SCORE: 47
ADLS_ACUITY_SCORE: 49
ADLS_ACUITY_SCORE: 43
ADLS_ACUITY_SCORE: 48
ADLS_ACUITY_SCORE: 44
ADLS_ACUITY_SCORE: 46
ADLS_ACUITY_SCORE: 56
ADLS_ACUITY_SCORE: 45
ADLS_ACUITY_SCORE: 56
ADLS_ACUITY_SCORE: 56
ADLS_ACUITY_SCORE: 43
ADLS_ACUITY_SCORE: 47
ADLS_ACUITY_SCORE: 49
ADLS_ACUITY_SCORE: 45
ADLS_ACUITY_SCORE: 48
ADLS_ACUITY_SCORE: 49
ADLS_ACUITY_SCORE: 46
ADLS_ACUITY_SCORE: 49
ADLS_ACUITY_SCORE: 43
ADLS_ACUITY_SCORE: 64
ADLS_ACUITY_SCORE: 43
ADLS_ACUITY_SCORE: 50
ADLS_ACUITY_SCORE: 56
ADLS_ACUITY_SCORE: 46
DRESSING/BATHING_DIFFICULTY: YES
ADLS_ACUITY_SCORE: 45
ADLS_ACUITY_SCORE: 43
ADLS_ACUITY_SCORE: 45
ADLS_ACUITY_SCORE: 45
ADLS_ACUITY_SCORE: 42
ADLS_ACUITY_SCORE: 49
ADLS_ACUITY_SCORE: 62
ADLS_ACUITY_SCORE: 46
ADLS_ACUITY_SCORE: 45
ADLS_ACUITY_SCORE: 43
ADLS_ACUITY_SCORE: 44
ADLS_ACUITY_SCORE: 64
ADLS_ACUITY_SCORE: 48
ADLS_ACUITY_SCORE: 44
WEAR_GLASSES_OR_BLIND: YES
ADLS_ACUITY_SCORE: 46
ADLS_ACUITY_SCORE: 58
ADLS_ACUITY_SCORE: 44
ADLS_ACUITY_SCORE: 48
ADLS_ACUITY_SCORE: 47
ADLS_ACUITY_SCORE: 58
ADLS_ACUITY_SCORE: 43
ADLS_ACUITY_SCORE: 45
ADLS_ACUITY_SCORE: 46
ADLS_ACUITY_SCORE: 45
ADLS_ACUITY_SCORE: 60
ADLS_ACUITY_SCORE: 55
ADLS_ACUITY_SCORE: 45
ADLS_ACUITY_SCORE: 56
ADLS_ACUITY_SCORE: 41
ADLS_ACUITY_SCORE: 46
ADLS_ACUITY_SCORE: 43
ADLS_ACUITY_SCORE: 49
ADLS_ACUITY_SCORE: 49
ADLS_ACUITY_SCORE: 44
ADLS_ACUITY_SCORE: 44
ADLS_ACUITY_SCORE: 46
ADLS_ACUITY_SCORE: 43
ADLS_ACUITY_SCORE: 44
ADLS_ACUITY_SCORE: 64
ADLS_ACUITY_SCORE: 49
ADLS_ACUITY_SCORE: 49
ADLS_ACUITY_SCORE: 44
ADLS_ACUITY_SCORE: 46
ADLS_ACUITY_SCORE: 44
ADLS_ACUITY_SCORE: 54
ADLS_ACUITY_SCORE: 56
ADLS_ACUITY_SCORE: 43
ADLS_ACUITY_SCORE: 52
ADLS_ACUITY_SCORE: 46
ADLS_ACUITY_SCORE: 50
ADLS_ACUITY_SCORE: 54
ADLS_ACUITY_SCORE: 56
ADLS_ACUITY_SCORE: 46
TOILETING_ISSUES: NO
ADLS_ACUITY_SCORE: 43
ADLS_ACUITY_SCORE: 53
ADLS_ACUITY_SCORE: 46
ADLS_ACUITY_SCORE: 46
ADLS_ACUITY_SCORE: 44
ADLS_ACUITY_SCORE: 44
ADLS_ACUITY_SCORE: 62
ADLS_ACUITY_SCORE: 42
ADLS_ACUITY_SCORE: 56
ADLS_ACUITY_SCORE: 47
ADLS_ACUITY_SCORE: 57
ADLS_ACUITY_SCORE: 54
ADLS_ACUITY_SCORE: 57
ADLS_ACUITY_SCORE: 52
ADLS_ACUITY_SCORE: 55
ADLS_ACUITY_SCORE: 42
ADLS_ACUITY_SCORE: 55
ADLS_ACUITY_SCORE: 46
ADLS_ACUITY_SCORE: 60
ADLS_ACUITY_SCORE: 54
ADLS_ACUITY_SCORE: 57
ADLS_ACUITY_SCORE: 60
ADLS_ACUITY_SCORE: 46
ADLS_ACUITY_SCORE: 62
ADLS_ACUITY_SCORE: 49
ADLS_ACUITY_SCORE: 57
ADLS_ACUITY_SCORE: 43
ADLS_ACUITY_SCORE: 46
ADLS_ACUITY_SCORE: 49
ADLS_ACUITY_SCORE: 45
ADLS_ACUITY_SCORE: 46
ADLS_ACUITY_SCORE: 64
IADL_COMMENTS: DEPENDENT
ADLS_ACUITY_SCORE: 42
ADLS_ACUITY_SCORE: 49
ADLS_ACUITY_SCORE: 44
ADLS_ACUITY_SCORE: 42
ADLS_ACUITY_SCORE: 46
ADLS_ACUITY_SCORE: 40
ADLS_ACUITY_SCORE: 43
ADLS_ACUITY_SCORE: 42
ADLS_ACUITY_SCORE: 42
ADLS_ACUITY_SCORE: 43
ADLS_ACUITY_SCORE: 44
ADLS_ACUITY_SCORE: 42
ADLS_ACUITY_SCORE: 46
ADLS_ACUITY_SCORE: 45
ADLS_ACUITY_SCORE: 41
ADLS_ACUITY_SCORE: 49
ADLS_ACUITY_SCORE: 62
ADLS_ACUITY_SCORE: 44
ADLS_ACUITY_SCORE: 65
ADLS_ACUITY_SCORE: 48
ADLS_ACUITY_SCORE: 43
ADLS_ACUITY_SCORE: 59
ADLS_ACUITY_SCORE: 44
ADLS_ACUITY_SCORE: 56
ADLS_ACUITY_SCORE: 60
ADLS_ACUITY_SCORE: 58
ADLS_ACUITY_SCORE: 42
ADLS_ACUITY_SCORE: 43
ADLS_ACUITY_SCORE: 55
ADLS_ACUITY_SCORE: 46
ADLS_ACUITY_SCORE: 47
ADLS_ACUITY_SCORE: 37
ADLS_ACUITY_SCORE: 45
ADLS_ACUITY_SCORE: 45
ADLS_ACUITY_SCORE: 43
ADLS_ACUITY_SCORE: 43
ADLS_ACUITY_SCORE: 49
ADLS_ACUITY_SCORE: 45
ADLS_ACUITY_SCORE: 43
ADLS_ACUITY_SCORE: 56
ADLS_ACUITY_SCORE: 56
ADLS_ACUITY_SCORE: 42
ADLS_ACUITY_SCORE: 48
ADLS_ACUITY_SCORE: 55
IADL_COMMENTS: DEPEND
ADLS_ACUITY_SCORE: 46
ADLS_ACUITY_SCORE: 52
ADLS_ACUITY_SCORE: 46
ADLS_ACUITY_SCORE: 49
ADLS_ACUITY_SCORE: 60
ADLS_ACUITY_SCORE: 43
ADLS_ACUITY_SCORE: 46
ADLS_ACUITY_SCORE: 43
ADLS_ACUITY_SCORE: 58
ADLS_ACUITY_SCORE: 43
ADLS_ACUITY_SCORE: 43
ADLS_ACUITY_SCORE: 42
ADLS_ACUITY_SCORE: 56
ADLS_ACUITY_SCORE: 44
ADLS_ACUITY_SCORE: 52
ADLS_ACUITY_SCORE: 57
ADLS_ACUITY_SCORE: 56
ADLS_ACUITY_SCORE: 42
ADLS_ACUITY_SCORE: 49
ADLS_ACUITY_SCORE: 42
ADLS_ACUITY_SCORE: 43
ADLS_ACUITY_SCORE: 46
ADLS_ACUITY_SCORE: 53
ADLS_ACUITY_SCORE: 62
ADLS_ACUITY_SCORE: 51
ADLS_ACUITY_SCORE: 60
ADLS_ACUITY_SCORE: 45
TRANSFERRING: 1-->ASSISTANCE (EQUIPMENT/PERSON) NEEDED (NOT DEVELOPMENTALLY APPROPRIATE)
ADLS_ACUITY_SCORE: 44
ADLS_ACUITY_SCORE: 44
ADLS_ACUITY_SCORE: 41
ADLS_ACUITY_SCORE: 59
ADLS_ACUITY_SCORE: 46
ADLS_ACUITY_SCORE: 45
ADLS_ACUITY_SCORE: 46
ADLS_ACUITY_SCORE: 46
ADLS_ACUITY_SCORE: 55
ADLS_ACUITY_SCORE: 46
ADLS_ACUITY_SCORE: 43
ADLS_ACUITY_SCORE: 62
ADLS_ACUITY_SCORE: 42
ADLS_ACUITY_SCORE: 43
ADLS_ACUITY_SCORE: 51
ADLS_ACUITY_SCORE: 54
ADLS_ACUITY_SCORE: 45
ADLS_ACUITY_SCORE: 45
ADLS_ACUITY_SCORE: 60
ADLS_ACUITY_SCORE: 49
ADLS_ACUITY_SCORE: 58
ADLS_ACUITY_SCORE: 45
ADLS_ACUITY_SCORE: 60
ADLS_ACUITY_SCORE: 57
ADLS_ACUITY_SCORE: 51
ADL_SUBSCALE_SCORE: 5.88
ADLS_ACUITY_SCORE: 43
ADLS_ACUITY_SCORE: 42
ADLS_ACUITY_SCORE: 42
ADLS_ACUITY_SCORE: 44
ADLS_ACUITY_SCORE: 43
ADLS_ACUITY_SCORE: 44
ADLS_ACUITY_SCORE: 49
ADLS_ACUITY_SCORE: 46
ADLS_ACUITY_SCORE: 43
ADLS_ACUITY_SCORE: 42
ADLS_ACUITY_SCORE: 50
ADLS_ACUITY_SCORE: 60
ADLS_ACUITY_SCORE: 62
ADLS_ACUITY_SCORE: 50
ADLS_ACUITY_SCORE: 60
ADLS_ACUITY_SCORE: 45
ADLS_ACUITY_SCORE: 44
ADLS_ACUITY_SCORE: 42
ADLS_ACUITY_SCORE: 44
ADLS_ACUITY_SCORE: 47
ADLS_ACUITY_SCORE: 56
ADLS_ACUITY_SCORE: 57
ADLS_ACUITY_SCORE: 45
ADLS_ACUITY_SCORE: 46
ADLS_ACUITY_SCORE: 39
ADLS_ACUITY_SCORE: 42
ADLS_ACUITY_SCORE: 46
ADLS_ACUITY_SCORE: 42
ADLS_ACUITY_SCORE: 42
ADLS_ACUITY_SCORE: 46
ADLS_ACUITY_SCORE: 53
ADLS_ACUITY_SCORE: 59
ADLS_ACUITY_SCORE: 49
ADLS_ACUITY_SCORE: 49
ADLS_ACUITY_SCORE: 45
ADLS_ACUITY_SCORE: 55
ADLS_ACUITY_SCORE: 65
ADLS_ACUITY_SCORE: 45
ADLS_ACUITY_SCORE: 47
ADLS_ACUITY_SCORE: 56
ADLS_ACUITY_SCORE: 43
ADLS_ACUITY_SCORE: 47
ADLS_ACUITY_SCORE: 50
ADLS_ACUITY_SCORE: 54
ADLS_ACUITY_SCORE: 46
ADLS_ACUITY_SCORE: 46
ADLS_ACUITY_SCORE: 55
ADLS_ACUITY_SCORE: 46
ADLS_ACUITY_SCORE: 49
ADLS_ACUITY_SCORE: 56
ADLS_ACUITY_SCORE: 60
ADLS_ACUITY_SCORE: 45
ADLS_ACUITY_SCORE: 46
ADLS_ACUITY_SCORE: 42
ADLS_ACUITY_SCORE: 41
ADLS_ACUITY_SCORE: 62
ADLS_ACUITY_SCORE: 43
ADLS_ACUITY_SCORE: 46
ADLS_ACUITY_SCORE: 62
ADLS_ACUITY_SCORE: 46
ADLS_ACUITY_SCORE: 62
ADLS_ACUITY_SCORE: 43
ADLS_ACUITY_SCORE: 56
ADLS_ACUITY_SCORE: 46
ADLS_ACUITY_SCORE: 42
ADLS_ACUITY_SCORE: 45
ADLS_ACUITY_SCORE: 47
ADLS_ACUITY_SCORE: 42
ADLS_ACUITY_SCORE: 56
DRESS: 1-->ASSISTANCE (EQUIPMENT/PERSON) NEEDED
DOING_ERRANDS_INDEPENDENTLY_DIFFICULTY: YES
ADLS_ACUITY_SCORE: 54
ADLS_ACUITY_SCORE: 55
ADLS_ACUITY_SCORE: 43
ADLS_ACUITY_SCORE: 43
ADLS_ACUITY_SCORE: 54
ADLS_ACUITY_SCORE: 65
ADLS_ACUITY_SCORE: 43
ADLS_ACUITY_SCORE: 47
ADLS_ACUITY_SCORE: 49
ADLS_ACUITY_SCORE: 42
ADLS_ACUITY_SCORE: 54
ADLS_ACUITY_SCORE: 42
ADLS_ACUITY_SCORE: 46
ADLS_ACUITY_SCORE: 47
ADLS_ACUITY_SCORE: 44
ADLS_ACUITY_SCORE: 41
ADLS_ACUITY_SCORE: 54
ADLS_ACUITY_SCORE: 51
ADLS_ACUITY_SCORE: 47
ADLS_ACUITY_SCORE: 43
ADLS_ACUITY_SCORE: 42
ADLS_ACUITY_SCORE: 42
ADLS_ACUITY_SCORE: 44
ADLS_ACUITY_SCORE: 50
ADLS_ACUITY_SCORE: 42
ADLS_ACUITY_SCORE: 45
ADLS_ACUITY_SCORE: 50
ADLS_ACUITY_SCORE: 55
ADLS_ACUITY_SCORE: 47
ADLS_ACUITY_SCORE: 54
ADLS_ACUITY_SCORE: 64
ADLS_ACUITY_SCORE: 46
ADLS_ACUITY_SCORE: 65
ADLS_ACUITY_SCORE: 42
ADLS_ACUITY_SCORE: 45
ADLS_ACUITY_SCORE: 42
ADLS_ACUITY_SCORE: 46
ADLS_ACUITY_SCORE: 47
ADLS_ACUITY_SCORE: 63
ADLS_ACUITY_SCORE: 44
ADLS_ACUITY_SCORE: 49
ADLS_ACUITY_SCORE: 54
ADLS_ACUITY_SCORE: 62
WALKING_OR_CLIMBING_STAIRS_DIFFICULTY: YES
ADLS_ACUITY_SCORE: 57
ADLS_ACUITY_SCORE: 45
ADLS_ACUITY_SCORE: 61
ADLS_ACUITY_SCORE: 43
ADLS_ACUITY_SCORE: 45
ADLS_ACUITY_SCORE: 42
ADLS_ACUITY_SCORE: 44
ADLS_ACUITY_SCORE: 46
ADLS_ACUITY_SCORE: 60
ADLS_ACUITY_SCORE: 64
ADLS_ACUITY_SCORE: 46
ADLS_ACUITY_SCORE: 43
ADLS_ACUITY_SCORE: 56
ADLS_ACUITY_SCORE: 58
ADLS_ACUITY_SCORE: 43
ADLS_ACUITY_SCORE: 42
ADLS_ACUITY_SCORE: 46
ADLS_ACUITY_SCORE: 45
ADLS_ACUITY_SCORE: 47
ADLS_ACUITY_SCORE: 44
ADLS_ACUITY_SCORE: 64
ADLS_ACUITY_SCORE: 43
ADLS_ACUITY_SCORE: 43
ADLS_ACUITY_SCORE: 46
ADLS_ACUITY_SCORE: 42
ADLS_ACUITY_SCORE: 44
ADLS_ACUITY_SCORE: 49
ADLS_ACUITY_SCORE: 43
ADLS_ACUITY_SCORE: 59
ADLS_ACUITY_SCORE: 52
ADLS_ACUITY_SCORE: 46
ADLS_ACUITY_SCORE: 43
ADLS_ACUITY_SCORE: 58
ADLS_ACUITY_SCORE: 58
ADLS_ACUITY_SCORE: 43
ADLS_ACUITY_SCORE: 42
ADLS_ACUITY_SCORE: 56
ADLS_ACUITY_SCORE: 42
ADLS_ACUITY_SCORE: 51
ADLS_ACUITY_SCORE: 60
ADLS_ACUITY_SCORE: 46
ADLS_ACUITY_SCORE: 47
ADLS_ACUITY_SCORE: 46
ADLS_ACUITY_SCORE: 42
ADLS_ACUITY_SCORE: 49
ADLS_ACUITY_SCORE: 41
ADLS_ACUITY_SCORE: 58
ADLS_ACUITY_SCORE: 56
ADLS_ACUITY_SCORE: 49
ADLS_ACUITY_SCORE: 46
ADLS_ACUITY_SCORE: 42
ADLS_ACUITY_SCORE: 37
ADLS_ACUITY_SCORE: 46
ADLS_ACUITY_SCORE: 46
ADLS_ACUITY_SCORE: 43
ADLS_ACUITY_SCORE: 43
ADLS_ACUITY_SCORE: 60
ADLS_ACUITY_SCORE: 59
ADLS_ACUITY_SCORE: 60
ADLS_ACUITY_SCORE: 64
ADLS_ACUITY_SCORE: 43
ADLS_ACUITY_SCORE: 46
ADLS_ACUITY_SCORE: 45
ADLS_ACUITY_SCORE: 46
ADLS_ACUITY_SCORE: 46
ADLS_ACUITY_SCORE: 59
ADLS_ACUITY_SCORE: 49
ADLS_ACUITY_SCORE: 52
ADLS_ACUITY_SCORE: 56
ADLS_ACUITY_SCORE: 43
ADLS_ACUITY_SCORE: 44
ADLS_ACUITY_SCORE: 44
ADLS_ACUITY_SCORE: 42
ADLS_ACUITY_SCORE: 43
ADLS_ACUITY_SCORE: 65
ADLS_ACUITY_SCORE: 42
ADLS_ACUITY_SCORE: 45
ADLS_ACUITY_SCORE: 46
ADLS_ACUITY_SCORE: 43
ADLS_ACUITY_SCORE: 46
ADLS_ACUITY_SCORE: 44
ADLS_ACUITY_SCORE: 45
ADLS_ACUITY_SCORE: 65
ADLS_ACUITY_SCORE: 50
ADLS_ACUITY_SCORE: 60
ADLS_ACUITY_SCORE: 54
ADLS_ACUITY_SCORE: 49
ADLS_ACUITY_SCORE: 55
ADLS_ACUITY_SCORE: 43
ADLS_ACUITY_SCORE: 44
ADLS_ACUITY_SCORE: 46
ADLS_ACUITY_SCORE: 45
ADLS_ACUITY_SCORE: 42
ADLS_ACUITY_SCORE: 46
ADLS_ACUITY_SCORE: 50
ADLS_ACUITY_SCORE: 43
ADLS_ACUITY_SCORE: 40
ADLS_ACUITY_SCORE: 65
ADLS_ACUITY_SCORE: 37
ADLS_ACUITY_SCORE: 46
ADLS_ACUITY_SCORE: 60
ADLS_ACUITY_SCORE: 44
ADLS_ACUITY_SCORE: 46
ADLS_ACUITY_SCORE: 58
ADLS_ACUITY_SCORE: 49
ADLS_ACUITY_SCORE: 55
ADLS_ACUITY_SCORE: 65
ADLS_ACUITY_SCORE: 57
ADLS_ACUITY_SCORE: 60
ADLS_ACUITY_SCORE: 46
ADLS_ACUITY_SCORE: 55
ADLS_ACUITY_SCORE: 56
ADLS_ACUITY_SCORE: 46
ADLS_ACUITY_SCORE: 44
ADLS_ACUITY_SCORE: 43
ADLS_ACUITY_SCORE: 57
ADLS_ACUITY_SCORE: 45
ADLS_ACUITY_SCORE: 45
ADLS_ACUITY_SCORE: 51
ADLS_ACUITY_SCORE: 49
ADLS_ACUITY_SCORE: 49
ADLS_ACUITY_SCORE: 46
ADLS_ACUITY_SCORE: 58
ADLS_ACUITY_SCORE: 64
ADLS_ACUITY_SCORE: 44
ADLS_ACUITY_SCORE: 43
ADLS_ACUITY_SCORE: 53
ADLS_ACUITY_SCORE: 50
ADLS_ACUITY_SCORE: 46
ADLS_ACUITY_SCORE: 42
ADLS_ACUITY_SCORE: 45
ADLS_ACUITY_SCORE: 65
ADLS_ACUITY_SCORE: 42
ADLS_ACUITY_SCORE: 46
ADLS_ACUITY_SCORE: 60
ADLS_ACUITY_SCORE: 44
ADLS_ACUITY_SCORE: 65
ADLS_ACUITY_SCORE: 48
ADLS_ACUITY_SCORE: 42
WEAR_GLASSES_OR_BLIND: YES
ADLS_ACUITY_SCORE: 45
ADLS_ACUITY_SCORE: 56
ADLS_ACUITY_SCORE: 49
ADLS_ACUITY_SCORE: 49
ADLS_ACUITY_SCORE: 52
ADLS_ACUITY_SCORE: 59
ADLS_ACUITY_SCORE: 54
ADLS_ACUITY_SCORE: 35
ADLS_ACUITY_SCORE: 49
ADLS_ACUITY_SCORE: 49
ADLS_ACUITY_SCORE: 54
ADLS_ACUITY_SCORE: 62
ADLS_ACUITY_SCORE: 51
ADLS_ACUITY_SCORE: 49
ADLS_ACUITY_SCORE: 48
ADLS_ACUITY_SCORE: 42
ADLS_ACUITY_SCORE: 49
ADLS_ACUITY_SCORE: 47
ADLS_ACUITY_SCORE: 60
ADLS_ACUITY_SCORE: 59
ADLS_ACUITY_SCORE: 59
ADLS_ACUITY_SCORE: 43
ADLS_ACUITY_SCORE: 59
ADLS_ACUITY_SCORE: 54
ADLS_ACUITY_SCORE: 43
ADLS_ACUITY_SCORE: 45
ADLS_ACUITY_SCORE: 45
ADLS_ACUITY_SCORE: 48
ADLS_ACUITY_SCORE: 58
ADLS_ACUITY_SCORE: 43
ADLS_ACUITY_SCORE: 34
ADLS_ACUITY_SCORE: 45
ADLS_ACUITY_SCORE: 45
ADLS_ACUITY_SCORE: 63
ADLS_ACUITY_SCORE: 52
ADLS_ACUITY_SCORE: 49
ADLS_ACUITY_SCORE: 46
ADLS_ACUITY_SCORE: 54
VISION_MANAGEMENT: GLASSES
ADLS_ACUITY_SCORE: 60
ADLS_ACUITY_SCORE: 43
ADLS_ACUITY_SCORE: 44
ADLS_ACUITY_SCORE: 46
ADLS_ACUITY_SCORE: 43
ADLS_ACUITY_SCORE: 46
ADLS_ACUITY_SCORE: 42
ADLS_ACUITY_SCORE: 64
ADLS_ACUITY_SCORE: 40
ADLS_ACUITY_SCORE: 43
ADLS_ACUITY_SCORE: 47
ADLS_ACUITY_SCORE: 48
ADLS_ACUITY_SCORE: 43
ADLS_ACUITY_SCORE: 46
ADLS_ACUITY_SCORE: 46
ADLS_ACUITY_SCORE: 49
ADLS_ACUITY_SCORE: 49
ADLS_ACUITY_SCORE: 47
ADLS_ACUITY_SCORE: 62
ADLS_ACUITY_SCORE: 56
ADLS_ACUITY_SCORE: 42
ADLS_ACUITY_SCORE: 44
ADLS_ACUITY_SCORE: 56
ADLS_ACUITY_SCORE: 46
ADLS_ACUITY_SCORE: 49
ADLS_ACUITY_SCORE: 43
ADLS_ACUITY_SCORE: 45
ADLS_ACUITY_SCORE: 43
ADLS_ACUITY_SCORE: 49
ADLS_ACUITY_SCORE: 62
ADLS_ACUITY_SCORE: 49
ADLS_ACUITY_SCORE: 53
ADLS_ACUITY_SCORE: 37
ADLS_ACUITY_SCORE: 42
ADLS_ACUITY_SCORE: 47
ADLS_ACUITY_SCORE: 42
ADLS_ACUITY_SCORE: 46
ADLS_ACUITY_SCORE: 44
ADLS_ACUITY_SCORE: 58
ADLS_ACUITY_SCORE: 62
ADLS_ACUITY_SCORE: 58
ADLS_ACUITY_SCORE: 46
ADLS_ACUITY_SCORE: 46
ADLS_ACUITY_SCORE: 49
ADLS_ACUITY_SCORE: 34
ADLS_ACUITY_SCORE: 46
ADLS_ACUITY_SCORE: 60
ADLS_ACUITY_SCORE: 46
ADLS_ACUITY_SCORE: 44
ADLS_ACUITY_SCORE: 47
ADLS_ACUITY_SCORE: 48
ADLS_ACUITY_SCORE: 44
ADLS_ACUITY_SCORE: 43
ADLS_ACUITY_SCORE: 60
ADLS_ACUITY_SCORE: 45
ADLS_ACUITY_SCORE: 65
ADLS_ACUITY_SCORE: 45
ADLS_ACUITY_SCORE: 59
ADLS_ACUITY_SCORE: 53
ADLS_ACUITY_SCORE: 49
ADLS_ACUITY_SCORE: 65
ADLS_ACUITY_SCORE: 43
ADLS_ACUITY_SCORE: 46
DRESSING/BATHING: BATHING DIFFICULTY, ASSISTANCE 1 PERSON
ADLS_ACUITY_SCORE: 45
ADLS_ACUITY_SCORE: 53
ADLS_ACUITY_SCORE: 49
ADLS_ACUITY_SCORE: 42
ADLS_ACUITY_SCORE: 43
ADLS_ACUITY_SCORE: 60
ADLS_ACUITY_SCORE: 45
ADLS_ACUITY_SCORE: 54
ADLS_ACUITY_SCORE: 49
ADLS_ACUITY_SCORE: 46
ADLS_ACUITY_SCORE: 52
TOILETING: 1-->ASSISTANCE (EQUIPMENT/PERSON) NEEDED
ADLS_ACUITY_SCORE: 46
ADLS_ACUITY_SCORE: 50
ADLS_ACUITY_SCORE: 42
ADLS_ACUITY_SCORE: 54
ADLS_ACUITY_SCORE: 42
ADLS_ACUITY_SCORE: 54
ADLS_ACUITY_SCORE: 48
ADLS_ACUITY_SCORE: 49
ADLS_ACUITY_SCORE: 46
TRANSFERRING: 1-->ASSISTANCE (EQUIPMENT/PERSON) NEEDED
ADLS_ACUITY_SCORE: 56
ADLS_ACUITY_SCORE: 44
ADLS_ACUITY_SCORE: 60
ADLS_ACUITY_SCORE: 42
ADLS_ACUITY_SCORE: 48
ADLS_ACUITY_SCORE: 62
TOILETING_ISSUES: YES
ADLS_ACUITY_SCORE: 43
ADLS_ACUITY_SCORE: 47
ADLS_ACUITY_SCORE: 44
ADLS_ACUITY_SCORE: 50
ADLS_ACUITY_SCORE: 44
ADLS_ACUITY_SCORE: 60
ADLS_ACUITY_SCORE: 42
ADLS_ACUITY_SCORE: 46
ADLS_ACUITY_SCORE: 42
ADLS_ACUITY_SCORE: 42
ADLS_ACUITY_SCORE: 56
TRANSFERRING: 0-->ASSISTANCE NEEDED (DEVELOPMENTALLY APPROPRIATE)
ADLS_ACUITY_SCORE: 47
ADLS_ACUITY_SCORE: 45
ADLS_ACUITY_SCORE: 45
ADLS_ACUITY_SCORE: 42
ADLS_ACUITY_SCORE: 54
ADLS_ACUITY_SCORE: 42
ADLS_ACUITY_SCORE: 42
ADLS_ACUITY_SCORE: 45
ADLS_ACUITY_SCORE: 62
ADLS_ACUITY_SCORE: 59
ADLS_ACUITY_SCORE: 46
ADLS_ACUITY_SCORE: 46
ADLS_ACUITY_SCORE: 57
ADLS_ACUITY_SCORE: 43
ADLS_ACUITY_SCORE: 46
ADLS_ACUITY_SCORE: 64
ADLS_ACUITY_SCORE: 58
ADLS_ACUITY_SCORE: 45
ADLS_ACUITY_SCORE: 43
ADLS_ACUITY_SCORE: 43

## 2023-01-01 ASSESSMENT — COLUMBIA-SUICIDE SEVERITY RATING SCALE - C-SSRS
3. HAVE YOU BEEN THINKING ABOUT HOW YOU MIGHT KILL YOURSELF?: NO
3. HAVE YOU BEEN THINKING ABOUT HOW YOU MIGHT KILL YOURSELF?: NO
4. HAVE YOU HAD THESE THOUGHTS AND HAD SOME INTENTION OF ACTING ON THEM?: NO
6. HAVE YOU EVER DONE ANYTHING, STARTED TO DO ANYTHING, OR PREPARED TO DO ANYTHING TO END YOUR LIFE?: NO
6. HAVE YOU EVER DONE ANYTHING, STARTED TO DO ANYTHING, OR PREPARED TO DO ANYTHING TO END YOUR LIFE?: NO
2. HAVE YOU ACTUALLY HAD ANY THOUGHTS OF KILLING YOURSELF IN THE PAST MONTH?: NO
1. IN THE PAST MONTH, HAVE YOU WISHED YOU WERE DEAD OR WISHED YOU COULD GO TO SLEEP AND NOT WAKE UP?: NO
5. HAVE YOU STARTED TO WORK OUT OR WORKED OUT THE DETAILS OF HOW TO KILL YOURSELF? DO YOU INTEND TO CARRY OUT THIS PLAN?: NO
2. HAVE YOU ACTUALLY HAD ANY THOUGHTS OF KILLING YOURSELF SINCE LAST CONTACT?: NO

## 2023-01-01 ASSESSMENT — HOOS S4: HOW SEVERE IS YOUR HIP JOINT STIFFNESS AFTER FIRST WAKENING IN THE MORNING?: MODERATE

## 2023-01-01 ASSESSMENT — PATIENT HEALTH QUESTIONNAIRE - PHQ9
SUM OF ALL RESPONSES TO PHQ QUESTIONS 1-9: 18
SUM OF ALL RESPONSES TO PHQ QUESTIONS 1-9: 11

## 2023-01-01 ASSESSMENT — PAIN SCALES - GENERAL: PAINLEVEL: EXTREME PAIN (9)

## 2023-01-01 NOTE — CARE PLAN
Pt A&O x4.Able to make needs known. Takes L EDWIN grigsby  Appears to be sleeping most of the shift. Assist of two lift. Denies SOB and CP. Call light is with in reach. To continue POC           Patient's most recent vital signs are:     Vital signs:  BP: 124/69  Temp: 96.4  HR: 89  RR: 18  SpO2: 90 %     Patient does not have new respiratory symptoms.  Patient does not have new sore throat.  Patient does not have a fever greater than 99.5.

## 2023-01-01 NOTE — PLAN OF CARE
Pt A&OX4, calm, pleasant, & cooperative with care. Denied CP, SOB, & n/v. Pt is A of 2 with liko lift for transfer. Spent the last 2 hrs of the shift on a recliner. Continent/incontinent for BM; continent for bladder. Takes med whole with thin liquid. Prefers to take flagyl with apple sauce. L forearm PIV patent & dressing CDI. Pt triggered sepsis & lactic acid result came back 0.7 this shift. Pt is able to make needs known & call light within reach. Will continue with plan of care.    Patient's most recent vital signs are:     Vital signs:  BP: 136/70  Temp: 96.5  HR: 88  RR: 18  SpO2: 96 %     Patient does not have new respiratory symptoms.  Patient does not have new sore throat.  Patient does not have a fever greater than 99.5.

## 2023-01-01 NOTE — PLAN OF CARE
A/Ox4. VSS on Room Air. Denied SOB, CP, Cough, N/T, Dizziness, Headache. No N/V this shift, off and on from certain medications. Pain treated with Tylenol and heating packs. Regular diet, thin liquids, takes pills whole (takes some with applesauce). Continent of B/B, LBM 12/29/22. Assist of 2 with Lift. Skin is intact except L hip incision steri strips BRII-CDI, no drainage. L PIV SL. Call light within reach, able to make needs known. Appears to be sleeping during rounds. Continue POC.    Patient's most recent vital signs are:     Vital signs:  BP: 124/69  Temp: 96.4  HR: 89  RR: 18  SpO2: 90 %     Patient does not have new respiratory symptoms.  Patient does not have new sore throat.  Patient does not have a fever greater than 99.5.

## 2023-01-02 NOTE — PROGRESS NOTES
Infect[facility] Disease [facility] Staff Note: Mr. Ortiz was seen, examined, and the case was discussed with Dr. Cronin, ID Fellow -- I agree with his interval history and examination, assessment and plan in this post-hospitalization outpatient ID Progress note. This note reflects my observations and opinions and the plan outlined fully reflects my approach. I have reviewed the available history, radiology, laboratory results, and reports with the Fellow.

## 2023-01-02 NOTE — CARE PLAN
Pt A&O x4, able to make his needs known. Had an uneventful night. Continent of B&B. Pt transfer using the liko lift with an assist of two. Has a L forearm PIV. Denies SOB and CP.  Call light is with in reach.Continue POC.        Patient's most recent vital signs are:     Vital signs:  BP: 128/51  Temp: 95.4  HR: 84  RR: 18  SpO2: 91 %     Patient does not have new respiratory symptoms.  Patient does not have new sore throat.  Patient does not have a fever greater than 99.5.

## 2023-01-02 NOTE — PLAN OF CARE
Pt A&OX4, calm, pleasant, & cooperative with care. Denied CP, SOB, & n/v. Pt is A of 2 with liko lift for transfer. Got back to bed from the recliner at the beginning of the shift. Continent/incontinent for BM; continent for bladder. Takes med whole with thin liquid. Prefers to take flagyl with apple sauce. Pt c/o headache & neck pain; requested for tylenol. PRN tylenol administered & pain subsided. L forearm PIV patent & dressing CDI. Pt is able to make needs known & call light within reach. Will continue with plan of care.    Patient's most recent vital signs are:     Vital signs:  BP: 128/51  Temp: 95.4  HR: 84  RR: 18  SpO2: 91 %     Patient does not have new respiratory symptoms.  Patient does not have new sore throat.  Patient does not have a fever greater than 99.5.

## 2023-01-02 NOTE — PLAN OF CARE
Pt is alert and oriented x4, able to make needs known. Denies chest pain, light headedness, dizziness or SOB. C/o neck pain, PRN oxycodone administered w/ mild relief. Assist of 2 w/ lift transfer. Call light is in reach, continue w/ POC.

## 2023-01-02 NOTE — PROGRESS NOTES
CLINICAL NUTRITION SERVICES - BRIEF NOTE     Nutrition Prescription    RECOMMENDATIONS FOR MDs/PROVIDERS TO ORDER:  None at this time.     Recommendations already ordered by Registered Dietitian (RD):  Will place order for Nutrisource Fiber once time tonight with dinner with large cup of ice water.     Future/Additional Recommendations:  Check supplement tolerance.      REASON FOR ASSESSMENT  Checked in with Waldo BIANCA Bustos about how the supplement trial was and if he tolerated any of the supplements.     Findings  Don said he didn't like the flavor of any of the supplements sent and appeared tired of trying new things. Patient aware that the antibiotics are planned to be stopped around 1/5 and was originally hoping to wait and see if his loose stools go back to normal once he is done with his antibiotic course. Informed patient about the importance of fiber and probiotics (pt receiving culturell). Pt stated understanding and was willing to give the Nutrisource Fiber a try tonight, but only that, and with a large cup of ice water.     INTERVENTIONS  Implementation  Will place order for Nutrisource Fiber once time tonight with dinner with large cup of ice water.      Follow up/Monitoring  Will continue to follow per protocol.     Kate Montes RD, EMMETT PEREZU RD pager: [phone]  [facility] Weekend/Holiday Pager: [phone]

## 2023-01-02 NOTE — PLAN OF CARE
Pt is A&Ox4. He c/o neck pain this shift. He was given tylenol which he thought did not help. Hot packs and cold packs also used with very little relief. Pt did not want oxycodone, stated it upsets his stomach too much. Pt up in recliner several times and then back to bed. Poor appetite. Pt is compliant with medications and participated in therapies. Pt was incontinent of bladder and used bedpan for BM. Continue POC.      Patient's most recent vital signs are:     Vital signs:  BP: 152/77  Temp: 96.9  HR: 92  RR: 18  SpO2: 91 %     Patient does not have new respiratory symptoms.  Patient does not have new sore throat.  Patient does not have a fever greater than 99.5.

## 2023-01-03 NOTE — PROGRESS NOTES
CLINICAL NUTRITION SERVICES - BRIEF NOTE     Nutrition Prescription    RECOMMENDATIONS FOR MDs/PROVIDERS TO ORDER:  None at this time.     Recommendations already ordered by Registered Dietitian (RD):  Nutrisource fiber once per day at dinner mixed into large glass of ice water.   Ensure Max once a day at dinner.    Future/Additional Recommendations:  Monitor supplement tolerance and weights/intake.      Findings  Checked in with patient to see tolerance of the Nutrisource fiber. Pt stated he would tolerate getting it once per day. Pt also mentioned he tried Ensure Max and would like that ordered as well, will place order for once per day    INTERVENTIONS  Implementation  Nutrisource fiber once per day at dinner mixed into large glass of ice water.   Ensure Max once a day at dinner.      Follow up/Monitoring  Will continue to follow per protocol.     Kate Montes RD, EMMETT   [facility] RD pager: [phone]   Weekend/Holiday Pager: [phone]

## 2023-01-03 NOTE — PROGRESS NOTES
"   01/03/23 1100   Appointment Info   Signing Clinician's Name / Credentials (OT) REINIER Stover   OT Assistant Visit Number 5   OT Goals   Therapy Frequency (OT) 6 times/wk   OT Predicted Duration/Target Date for Goal Attainment 02/07/23   OT Goals Hygiene/Grooming;Upper Body Dressing;Lower Body Dressing;Upper Body Bathing;Lower Body Bathing;Bed Mobility;Transfers;Toilet Transfer/Toileting   OT: Hygiene/Grooming modified independent   OT: Upper Body Dressing Modified independent   OT: Lower Body Dressing Minimal assist;Moderate assist;using adaptive equipment   OT: Upper Body Bathing Supervision/stand-by assist   OT: Lower Body Bathing Moderate assist;using adaptive equipment   OT: Bed Mobility Modified independent;supine to/from sitting   OT: Transfer Minimal assist;within precautions   OT: Toilet Transfer/Toileting Minimal assist;toilet transfer;cleaning and garment management;within precautions   OT: Meal Preparation   (discontinued; not appropriate)   OT: Home Management   (discontinued; not appropriate)   Self-Care/Home Management   Self-Care/Home Mgmt/ADL, Compensatory, Meal Prep Minutes ([id number]) 30   Treatment Detail/Skilled Intervention BRII: Pt seated in recliner upon arrival in reclined position, LEs elevated. Pt continues to c/o neck pain and declines gentle stretching, \"I've been doing that\". Pt educated on encouraging functional sitting position to promote core strength and functional positioning for ADLs. Pt begins to lower Les via power remote and discontinues when retirement down d/t pain in LLE. Pt asks writer to \"move left knee in\". Writer shifts LLE approx 1\" and pt c/o increased pain and does not tolerate any further. Pt selective with activities willing to trial. Pt declines w/c to OT gym d/t w/c discomfort despite writer educating and demo'ing technique to increase safety/comfort. Discussed the need to attempt rolling in therapies,  \"I don't roll. It hurts\". Extensive conversation had about " "realistic goals for therapy and home likely not being a fisable option from TCU. Pt in agreement although, \"I don't know what to do or what my insurance will cover and my girlfriend can't physically take care of me at home nor do I want her to have to. I wish I never had this surgery\". Will update team at rounds for further discussion.   OT Discharge Planning   OT Plan OT: bed mob, discuss plan for toileting, core/UE strengthening, act tolerance, functional sitting position in recliner for ADLs   OT Discharge Recommendation (DC Rec) home with home care occupational therapy;home with assist  (versus placement to group home/LTC pending progress)   OT Brief overview of current status OT: Pt has made limited functional progress during stay   Total Session Time   Timed Code Treatment Minutes 30   Total Session Time (sum of timed and untimed services) 30   Post Acute Settings Only   What unit is patient on? TCU       "

## 2023-01-03 NOTE — PROGRESS NOTES
SW attempted to call pt's partner, Claire (ph: [phone]) to schedule care conference for either Thursday, 1/5 at 1400 or Friday, 1/6 at either 1100 or 1400 to discuss pt discharge plan and lack of participation with therapies. There was no answer. SW left VM explaining purpose of call and requesting she call back with preference on day and time she would be able to attend.     Justina Lutz, [facility]   [facility]

## 2023-01-03 NOTE — PROGRESS NOTES
Status at Admission - 12/27/2022 Current Status - 1/3/2022   Bed Mobility   Resident refused to perform, partially rolling to L with A x 2   Unchanged   Transfer   Liko lift   Liko lift   Gait   Unable given NWB status   Unchanged   Stairs   Unable given NWB status   Unchanged   Wheelchair Propulsion   Untested   Pt unable to tolerate sitting in wc for any length of time due to hip pain         Assessment of Progress Since Last Update: Pt has seen very little progress since admission. Barriers are low participation, ongoing complaints of pain with nearly any movement, and high level of debility with NWB status on L. Pt has improved in his ability to tolerate sitting upright in recliner more frequently throughout the day.    Proposed Solutions to Improve Barriers: Therapy will continue to encourage and approach pt on the purpose and necessity of therapy to improve function. Medical team is managing pain.    Justification for Continued Therapy Services: Pt requires further skilled therapy in order to ensure safety with transfers, currently pt is using a lift. Pt's long term healing outcomes will improve if he participates fully in therapy session as well as expected pain reduction.    Additional Considerations for Safe and Efficient Discharge: Pt is unlikely to return to prior living situation given history of participation in therapy and current function. Alternate discharge environment will need to be found to accommodate pt's debility.

## 2023-01-03 NOTE — PROGRESS NOTES
ZEB talked to Claire/S/O about care conference.  She is not surprised.  She said son  20 yrs ago and pt has never been the same.  Pt doesn't believe in therapy.  Claire thinks pt has bipolar.  For sure depression. Now dementia.  Pt doesn't remember conversations.  SW will call and get Ipad codes and call her back.  We set CC on Thursday at 2 pm.     NEREYDA Sharp   [facility], Transitional Care Unit   Social Work   [facility] [place] [address]  () [phone]

## 2023-01-03 NOTE — PLAN OF CARE
RN [id number]  Pt is alert and oriented x4. Able to make needs known. Denies chest pain, light headedness, dizziness or SOB. Assist of 2 w/ lift transfer. Alternated between chair and bed.Continent of bowel and bladder. Utilizes urinal. LBM 1/2//23. C/o neck pain, utilized hot packs. Pt also requested for PRN tylenol and oxycodone, administered w/ mild relief. No new concerns, call light is in reach, continue w/ POC.

## 2023-01-03 NOTE — CARE PLAN
Problem: Skin Injury Risk Increased  Goal: Skin Health and Integrity  Description: Perform a full pressure injury risk assessment, as indicated by screening, upon admission to care unit.    Reassess skin (full inspection and injury risk, including skin temperature, consistency and color) frequently (e.g., scheduled interval, with change in condition) to provide optimal early detection and prevention.    Maintain adequate tissue perfusion (e.g., encourage fluid balance; avoid crossing legs, constrictive clothing or devices) to promote tissue oxygenation.    Maintain head of bed at lowest degree of elevation tolerated, considering medical condition and other restrictions. Use positioning supports to prevent sliding and friction. Consider low friction textiles    Outcome: Progressing     Problem: Hip Arthroplasty  Goal: Optimal Coping  Outcome: Progressing     Problem: Hip Arthroplasty  Goal: Effective Bowel Elimination  Outcome: Progressing     Problem: Infection  Goal: Absence of Infection Signs and Symptoms  Description: Implement transmission-based precautions and isolation, as indicated, to prevent spread of infection.    Obtain cultures prior to initiating antimicrobial therapy, when possible. Do not delay treatment for laboratory results in the presence of high suspicion or clinical indicators.    Administer ordered antimicrobial therapy promptly; reassess need regularly.    Monitor laboratory value, diagnostic test and clinical status trends for signs of infection progression.    Identify early signs of sepsis, such as increased heart rate and decreased blood pressure, as well as changes in mental state, respiratory pattern or peripheral perfusion.    Outcome: Progressing     Problem: Oral Intake Inadequate  Goal: Improved Oral Intake  Description: Perform a nutrition assessment that includes a nutrition-focused physical exam; identify malnutrition risk.    Assess for adequate oral intake; if inadequate,  offer oral supplemental food or drinks to enhance calorie and protein intake.    Assess for vitamin and mineral deficiencies; supplement if depleted.    Assess need and assist with with meal set-up and feeding.    Adjust diet or meal schedule based on preferences and tolerance.    Minimize unnecessary dietary restrictions to increase oral intake.    Outcome: Progressing     Problem: BADL (Basic Activities of Daily Living) Impairment  Goal: Optimal Safe BADL Performance  Description: Assess BADL (basic activity of daily living) abilities; encourage participation at maximally safe independent level.    Provide assistance and supervision needed to maintain safety; involve caregiver in BADL (basic activity of daily living) training.    Ensure effective use of equipment or devices, such as a long-handled reacher, shower seat or orthosis.    Ensure proper body mechanics and positioning for optimal task performance.    Provide set-up of items if patient is unable to retrieve; store personal care items in accessible location.    Schedule BADL (basic activity of daily living) activities when pain and fatigue are at a minimum; pace activity to conserve energy.        Outcome: Progressing     Problem: Diarrhea  Goal: Effective Diarrhea Management  Description: Provide fluid and electrolyte replacement to correct any imbalance through use of oral rehydration solution, nasogastric tube or intravenous fluid therapy.    Utilize skin protectant barrier to maintain skin integrity; cleanse gently, thoroughly and promptly after stooling; avoid alcohol-containing wipes.    Continue usual diet; encourage fluids (e.g., broth, soups, fruit juices).    Keep toilet, toileting devices and aids readily accessible and barrier-free to maintain safety.    Provide comfort measures and privacy.    Outcome: Progressing     Problem: Urinary Incontinence  Goal: Effective Urinary Elimination  Description: Identify cause and contributing factors, such  as disease process, medication or treatment side effects, infection or dietary bladder irritants; provide treatment.    Assess for general symptoms, such as fatigue, depression, weakness, confusion, sensory alterations that could impact toileting.    Provide safe and ready access to toileting devices and aids (e.g., commode, urinal).    Promote behavioral methods, such as prompted toileting, elimination schedule, relaxation techniques or voiding posture to facilitate flow.    Consider pelvic floor muscle strengthening, such as pelvic floor muscle training, vaginal cone, bladder support or neuromodulation.    Ensure bladder emptying (e.g., intermittent catheterization, portable bladder ultrasound after voiding).    Outcome: Progressing     Problem: Pain Chronic (Persistent)  Goal: Optimal Pain Control and Function  Description: Evaluate pain level, effect of treatment and patient response at regular intervals.    Minimize pain stimuli; coordinate care and adjust environment (e.g., light, noise, unnecessary movement); promote sleep/rest.    Match pharmacologic analgesia to severity and type of pain mechanism (e.g., neuropathic, muscle, inflammatory); consider multimodal approach (e.g., nonopioid, opioid, adjuvant).    Provide medication at regular intervals; titrate to patient response.    Manage breakthrough pain with additional doses; consider rotation or switching medication    Outcome: Progressing     Problem: Oral Mucous Membrane Integrity Impairment  Goal: Improved Oral Health  Description: Assess oral cavity regularly.    Implement oral care program, such as brushing or swabbing oral structures, flossing or oral rinses after meals and as needed based on assessed condition and comfort level.    Promote use of bland, nonalcohol-containing rinses for hygiene and comfort.    Remove dental appliances for cleaning.    Apply moisturizer to lips and mouth to prevent dryness and maintain integrity.    Monitor and  address changes in oral intake.    Assess gag reflex to determine risk for aspiration; suction carefully to remove secretions as needed.    Encourage nonirritating food and fluids when able.  Manage pain; provide topical or oral analgesia, especially before meals.    Anticipate the need for topical or oral antimicrobial agents.    Outcome: Progressing    Goal Outcome Evaluation: ongoing

## 2023-01-03 NOTE — PLAN OF CARE
Pt aox4, A2 liko lift (not oob this shift) NWB to LLE. Incontinent x2. L PIV SL. Regular diet, thin liquids, takes pills whole.    Patient's most recent vital signs are:     Vital signs:  BP: 159/76  Temp: 96.1  HR: 89  RR: 18  SpO2: 92 %     Patient DOES NOT have new respiratory symptoms.  Patient DOES NOT have new sore throat.  Patient DOES NOT have a fever greater than 99.5.

## 2023-01-04 NOTE — PLAN OF CARE
Goal Outcome Evaluation:       Overall no acute issue noted this shift. Pt is alert and oriented x 4. Able to make needs known to staffs. Lift for transfers. Spent the entire shift in room sleeping. Denied having chest pain, SOB, fever and chills. No PRN requested this shift. Will continue with POC.       Patient's most recent vital signs are:     Vital signs:  BP: 120/80  Temp: 96.5  HR: 91  RR: 16  SpO2: 94 %     Patient does not have new respiratory symptoms.  Patient does not have new sore throat.  Patient does not have a fever greater than 99.5.

## 2023-01-04 NOTE — PROGRESS NOTES
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 Course c/b acute encephalopathy (2/2 anesthesia and sedating meds), acute hypoxic respiratory failure (2/2 OHS), acute blood loss anemia, HALEY (prerenal), and physical deconditioning. Transferred to TCU for rehabilitation     Chronic L hip PJI s/p explantation, debridement, and reconstruction with antibiotic spacer 11/22/22: Per Dr. Meyers. Surgical cultures +for Finegoldia magna and Proteus mirabilis (also had C. acnes in the past but doing well despite no directed treatment). S/p 6 week course Cipro and Flagyl per ID (completed 1/3). Per notes higher risk for treatment failure given comorbidities, sinus tract, and h/o PJI. WBC and CRP normal. Pain is controlled but extremely limited in mobility (requiring lift).   - NWB LLE. PT/OT  - Follow up with Dr. Meyers 1/5 as planned. Anticipate antibiotic holiday before considering future reimplantation.   - Continue Tylenol. Decrease oxycodone to 5 mg bid prn given rare use    DM2: A1C 6.8% 11/23. PTA on glipizide, pioglitazone, metformin, and Trulicity. Currently on Metformin, Actos, Victoza. Glucose stable  - Continue PTA metformin and Actos. On Victoza here as Trulicity is non formulary  - Home glipizide remains on hold; at risk for hypoglycemia as PO intake is below BL       Stable and Resolved Issues:  Physical deconditioning: In the setting of the above. Per PT, OT motivation and progress have been slow  Intermittent nausea: Likely d/t metronidazole. No ongoing concerns noted 1/4. Monitor   JAIR, OHS: Continue CPAP  Acute on  chronic normocytic anemia with acute blood loss: BL hgb around 10, low to 6.3 post-op. S/p 4 U RBC in hospital. Hgb now stable at BL. Monitor   HLD: Continue statin  H/o DVT/PE: PTA apixaban held prior to surgery. Started on ASA immediately post-op, then transitioned to apixaban 2.5mg BID x 2 weeks, then restarted full therapeutic dose. Continue apixaban   Venous insufficiency: Chronic edema managed with Lasix. Diuretics held post-op in setting of HALEY. Restarted without issue. No significant edema on exam today. Continue lasix with hold parameters  Non-severe protein calorie malnutrition: In the setting of the above. Encourage oral intake      The above was discuss with patient and attending physician, Dr. Dillon who agrees with the above    CODE: Full Code  DVT: Apixaban  Diet: Regular diet  Indications for Psychotropic Medications: N/A  Disposition: PT 1/24 and OT 2/7; is still requiring lift for transfers. Will meet with Dr. Meyers 1/5 to determine next steps (suspect he will be looking at a 2 month antibiotic holiday before hip can be reimplanted). Has 7 steps so therapy is going to recommend KENDALL as patient/significant other have not been interested in purchasing a chair lift or setting up home for first floor bed/commode    Mae Moseley PA-C  Hospitalist Service  Pager: [phone]  ________________________________________________________________    Subjective & Interval History:    No acute changes. Frustrated with limited improvement with therapies and communication with their team. For example, he does not see benefit in setting in the wheelchair or going down the the gym. Discussed benefits of getting out of bed, even with a lot of help. His goal is to be able to get up and use the toilet. Currently doing bed exercises with therapy which he does not find very helpful. No other acute concerns or questions    Last 24 hour care team notes reviewed.   ROS: 4 point ROS (including Respiratory, CV, GI and )  was performed and negative unless otherwise noted in HPI.     Medications: Reviewed in EPIC.    Physical Exam:    General Appearance: Alert and oriented x3, flat affect  HEENT: Anicteric sclera, MMM   Respiratory: Breathing comfortably on room air, lungs CTAB without wheezing or crackles   Cardiovascular: RRR, S1, S2. No murmur noted  GI: Abdomen soft, non-tender with active bowel sounds. No guarding or rebound  Lymph/Hematologic: No peripheral edema, distal pulses palpable   Skin: No rash or jaundice. BLE venous stasis changes  Musculoskeletal: Moves all extremities   Neurologic: No focal deficits, CN II-XII grossly intact      Lines/Tubes/Drains:   Peripheral IV 12/12/22 Anterior;Left Lower forearm (Active)   Site Assessment WDL 12/31/22 2000   Line Status Saline locked 12/31/22 2000   Phlebitis Scale 0-->no symptoms 12/31/22 2000   Infiltration Scale 0 12/31/22 2000   Infiltration Site Treatment Method  None 12/22/22 1700   If infiltrated, was a vesicant infusing? No 12/20/22 1200   Number of days: 23

## 2023-01-04 NOTE — PROGRESS NOTES
"   01/04/23 1012   Appointment Info   Signing Clinician's Name / Credentials (OT) Aga Reddy,OTR/L,LUIS   Rehab Comments (OT) OT:bedside adls, rolling   OT Goals   Therapy Frequency (OT) 6 times/wk   OT Predicted Duration/Target Date for Goal Attainment 02/07/23   OT: Hygiene/Grooming modified independent   OT: Upper Body Dressing Modified independent   OT: Lower Body Dressing Minimal assist;Moderate assist;using adaptive equipment   OT: Upper Body Bathing Supervision/stand-by assist   OT: Lower Body Bathing Moderate assist;using adaptive equipment   OT: Bed Mobility Modified independent;supine to/from sitting   OT: Transfer Minimal assist;within precautions   OT: Toilet Transfer/Toileting Minimal assist;toilet transfer;cleaning and garment management;within precautions   OT: Meal Preparation   (d/c goal)   OT: Home Management   (d/c goal)   Self-Care/Home Management   Self-Care/Home Mgmt/ADL, Compensatory, Meal Prep Minutes (46985) 45   Treatment Detail/Skilled Intervention OT: agreeeable to beside ub swash and grooming set up bedside, oral cares set up and gown change set up, disucced POC and need to work on rolling/mobility to get home,dep w/ pericares, th assisted pt w/  warm up exer for minimal  daniella hip flex /knee flex in preparation for rolling, due to pain and pt's preferances, increased tme/effort and th instruction on approach pt demo rolling toward L incomplete sidelying w/ cga and use of bed rail on flat bed, attempted to roll toward R side leading w/ L weaker/painful side w/ minimal active movement into sidelying w/ th providing assist to support part of pt's wt while pt attempted the roll, pt refused additional assist in antic of pain and wanting control of movement, repeatedly stated both directions \"don't push me\", pt able to roll far enough towards each direction for th to perform rear pericares and replace soiled marcelo w/ clean marcelo pad.   OT Discharge Planning   OT Plan OT: bed mob, discuss " plan for toileting, core/UE strengthening, act tolerance, functional sitting position in recliner for ADLs, consider coRx w/PT   OT Discharge Recommendation (DC Rec) home with home care occupational therapy;home with assist   OT Brief overview of current status OT: pt limted performance due to obesity, weakness, limted sh active movement and pt's frequent c/o pain, denied sh pain today but c/o neck pain, pt requires encouragement to actively partic, which is a signficant barrier to progress, instructed pt to perform ROM exer w/ neck flex,rotationa and lateral flex to improve movement, pt demo and agreed to do exer.   Total Session Time   Timed Code Treatment Minutes 45   Total Session Time (sum of timed and untimed services) 45   Post Acute Settings Only   What unit is patient on? TCU

## 2023-01-04 NOTE — PROGRESS NOTES
ZEB set up pt tablet device ID and PIN.     ZEB attempted to call pt's partner Claire (ph: [phone]) to provide her with ID and PIN information so she could attend the care conference on Thursday, 1/5 at 1400. There was no answer so SW left  with care conference phone number ([phone]), ID ([id number]) and PIN ([id number]). ZEB requested Claire call back with any questions.      Justina Lutz, [facility]   [facility]

## 2023-01-04 NOTE — PLAN OF CARE
Goal Outcome Evaluation:    VS: BP (!) 144/77 (BP Location: Right arm, Patient Position: Semi-Fuentes's, Cuff Size: Adult Regular)   Pulse 91   Temp (!) 96  F (35.6  C) (Oral)   Resp 16   Wt 118.5 kg (261 lb 3.2 oz)   SpO2 93%   BMI 37.48 kg/m     O2: RA   Output: Urinal at bedside; staff empties   Last BM: Bedpan    Activity: Participated in therapy; not OOB - declined offer to get OOB   Skin: X; groin, hip   Pain: Denies    CMS Neuro: A&O, makes needs known.    Dressing: Hip incision CDI steri-strips BRII   Diet: Reg  BG 98   LDA:    Equipment: Liko   Plan: Con't POC   Additional Info:          Patient's most recent vital signs are:     Vital signs:  BP: 144/77  Temp: 96  HR: 91  RR: 16  SpO2: 93 %     Patient does not have new respiratory symptoms.  Patient does not have new sore throat.  Patient does not have a fever greater than 99.5.

## 2023-01-05 NOTE — PROGRESS NOTES
[facility]  Orthopaedic Surgery, Joint Replacement Consultation  by Rom Meyers M.D.     Waldo Bustos MRN# [id number]     YOB: 1950      Requesting physician: No ref. provider found  Jv Felix, PCP  MD L[facility]  Jem Hickman MD [facility], [place], [place]      Background history:  DX:  1. Morbid obesity Body mass index is 45.2 kg/m .  2. Diabetes mellitus  3. Left BELLA prosthetic joint infection  4. Hx of PE. Long term anticoag     TREATMENTS:  1. 3/7/2018, left two incision total hip arthroplasty (Heller)  2. 1/23/2019, revision left total hip arthroplasty (Heller) [facility]. IV Ceftriaxone x 4 weeks for C Acnes  3. 2/2021, L hip aspiration (JACKELIN)  4. 4/6/2022, C Acnes  5. 4/27/2022, drain placement. C Acnes.  6. 11/14/2022 L hip aspiration 3+ Proteus mirabilis , 4+ Porphyromonas sp.  7. 11/22/2022, explantation L BELLA, non-articulating PMMA spacer (Luigi) [facility]. Proteus mirabilis, Finegoldia magna, zosyn x 1 wk > po cipro/po metronidazole x 6 weeks from 11/22/22.        Patient returns to see me and is residing in the [facility] on the [facility].  He has now discontinued all oral antibiotic therapy as of 3 days ago.  He reports no fever or drainage from the left hip.    Examination demonstrates incision is well-healed.  A single suture remaining is removed.  No erythema no fluctuance identified.    X-rays demonstrate the spherical antibiotic space within his acetabulum in place.  The femoral diaphysis is in place as well.    Impression and plan:  1.  Infection appears to resolve clinically.  2.  Check ESR and CRP.  3.  At completion of his 8-week antibiotic holiday, arrange for left hip aspiration and testing including cell count, differential, alpha defense and synovasure, aerobic and anaerobic cultures and fungal cultures.  4.  Provided all infection work-up is negative, will then have the greenlight to proceed with second stage reimplantation  surgery.  Depending upon his medical comorbidities at that time , decision will be made regarding timing of second stage reimplantation revision hip surgery.  I expect a proximal femoral placement device will be necessary.  5.  I have encouraged patient to concentrate and focus on weight loss reduction in effort to reduce the morbidity of his upcoming second stage reimplantation revision hip surgery.  Furthermore he should continue to maintain his diabetic control.  6.  Next follow-up visit after his left hip aspiration has been completed.  7.  I have also encouraged patient to focus on rehabilitation and weightbearing of the right lower extremity while nonweightbearing in the left lower extremity and mobility training.    MD Vanessa Stephenson Family Professor  Oncology and Adult Reconstructive Surgery  Dept Orthopaedic Surgery, [facility]  [phone] office, [phone] pager  [email or web address]    Total combined visit time and work time before and after clinic visit on encounter date = 30 min

## 2023-01-05 NOTE — PROGRESS NOTES
01/05/23 1400   Appointment Info   Signing Clinician's Name / Credentials (PT) Mayc Florence PTA   PT Assistant Visit Number 3   Therapeutic Activity   Therapeutic Activities: dynamic activities to improve functional performance Minutes (21235) 30   Treatment Detail/Skilled Intervention PT: completed care conference w/SW, PT, OT, Nsg, Pt and Pt's SO present for meeting. OT/PT gave updates on Pt's limited progress and lack of OOB activity tolerance and participation in therapy sessions d/t L hip pain. Pt's SO did not have any questions and Pt was supine in bed w/eyes closed. when asked if they had questions, Pt shook head in the negative.   Total Session Time   Timed Code Treatment Minutes 30   Total Session Time (sum of timed and untimed services) 30

## 2023-01-05 NOTE — PLAN OF CARE
"  Problem: Plan of Care - These are the overarching goals to be used throughout the patient stay.    Goal: Plan of Care Review  Description: The Plan of Care Review/Shift note should be completed every shift.  The Outcome Evaluation is a brief statement about your assessment that the patient is improving, declining, or no change.  This information will be displayed automatically on your shift note.  Outcome: Not Progressing  Flowsheets (Taken 1/5/2023 1736)  Plan of Care Reviewed With: patient  Overall Patient Progress: no change     Problem: Plan of Care - These are the overarching goals to be used throughout the patient stay.    Goal: Patient-Specific Goal (Individualized)  Description: You can add care plan individualizations to a care plan. Examples of Individualization might be:  \"Parent requests to be called daily at 9am for status\", \"I have a hard time hearing out of my right ear\", or \"Do not touch me to wake me up as it startles me\".  Outcome: Not Progressing     Problem: Plan of Care - These are the overarching goals to be used throughout the patient stay.    Goal: Readiness for Transition of Care  Outcome: Not Progressing  Flowsheets (Taken 1/5/2023 1736)  Transportation Anticipated: family or friend will provide  Concerns to be Addressed:    medication    discharge planning  Intervention: Mutually Develop Transition Plan  Recent Flowsheet Documentation  Taken 1/5/2023 1736 by Juaquin Gillespie RN  Transportation Anticipated: family or friend will provide  Concerns to be Addressed:    medication    discharge planning     Problem: Plan of Care - These are the overarching goals to be used throughout the patient stay.    Goal: Readiness for Transition of Care  Intervention: Mutually Develop Transition Plan  Recent Flowsheet Documentation  Taken 1/5/2023 1736 by Juaquin Gillespie RN  Transportation Anticipated: family or friend will provide  Concerns to be Addressed:    medication    discharge planning   "   Problem: TCU Patient Goals  Goal: TCU Plan of Care  Outcome: Progressing  Flowsheets (Taken 1/5/2023 8532)  Bowel: incontinent  Bladder: continent   Goal Outcome Evaluation:  /53 (BP Location: Right arm)   Pulse 84   Temp (!) 96  F (35.6  C) (Oral)   Resp 18   Wt 118.5 kg (261 lb 3.2 oz)   SpO2 92%   BMI 37.48 kg/m    Iso:  No active isolations  Diet: Regular Diet Adult  Snacks/Supplements Adult: Nutrisource Fiber; With Meals  Snacks/Supplements Adult: Ensure Max Protein (bariatric); Between Meals  Mental Status:     Main Acuity Score: 149  O2:     Mg+:    K:     PLT:    HGB:    BS: 111 (01/05 1707)    Infusions: None.         Plan of Care Reviewed With: patient    Overall Patient Progress: no changeOverall Patient Progress: no change     Patient does not have new respiratory symptoms.  Patient does not have new sore throat.  Patient does not have a fever greater than 99.5.

## 2023-01-05 NOTE — PLAN OF CARE
Goal Outcome Evaluation:       Overall Pt had no acute issue this shift. Alert and oriented x4. Able to make needs known to staffs. Continent of both bowel and bladder. Uses bed pan for elimination. Medication taken whole with water. Denied discomfort. Will continue with POC      Patient's most recent vital signs are:     Vital signs:  BP: 123/72  Temp: 95.8  HR: 87  RR: 16  SpO2: 95 %     Patient does not have new respiratory symptoms.  Patient does not have new sore throat.  Patient does not have a fever greater than 99.5.

## 2023-01-05 NOTE — PROGRESS NOTES
Care Conference  Pt, PT, OT, floor nurse and SW attended.  No provider. S/O by phone.  Therapies both said pt is not progressing. pt requires encouragement to actively partic, which is a signficant barrier to progress.For example, he does not see benefit in setting in the wheelchair or going down the the gym. Discussed benefits of getting out of bed, even with a lot of help. His goal is to be able to get up and use the toilet. Currently doing bed exercises with therapy which he does not find very helpful. Pt requires help for most ADL's and all IADL's.  Nursing said they didn't have much to say.  They help pt with meeting needs. SW said insurance company said they will be issuing a NOMNC on Monday for LCD on Wednesday.  SW said do you know of an KENDALL?  Pt laughed and said no.  Claire said it would be impossible for pt to return home. SW asked if pt had the funds to pay for senior care?  Pt said no.  SW will get pt in touch with FW.  Pt was not very happy about any of the news.  SW asked if pt yovany like to talk to a therapist.  Pt declined. SW asked if pt would like to talk to veronika. Pt said he is not spiritual but last veronika was okay to talk to.     ZEB called Catrachito/FERDINAND [phone].  SW left a vm for Veronika/[place] [phone] and asked her to speak to pt when she comes to TCU next time.   SW left a message about his son passing away 20 years ago and never being the same.  ZEB asked if they could talk about next steps in pt life.      ZEB received a call from Alyssa/[facility].  [facility] use to be a SNF .  Pollo talked to Claire about the care conference. Pollo asked if ZEB could send a referral over to [facility] in [place].  ZEB said yes, please stop over there and talk to them.  Pollo said he would as he knows all the staff.      NEREYDA Sharp   [facility], Transitional Care Unit   Social Work   [facility] 4th [place] [address]  () [phone]

## 2023-01-05 NOTE — PLAN OF CARE
Goal Outcome Evaluation:    VS: /73 (BP Location: Right arm, Patient Position: Semi-Fuentes's, Cuff Size: Adult Regular)   Pulse 89   Temp (!) 96  F (35.6  C) (Oral)   Resp 18   Wt 118.5 kg (261 lb 3.2 oz)   SpO2 96%   BMI 37.48 kg/m      O2: RA   Output: Urinal at bedside; staff empties   Last BM: Bedpan 1/4 small   Activity: Participated in therapy; not OOB - declined offer to get OOB  Care Conference in pt room   Skin: X; groin, hip   Pain: Oxy x1    CMS Neuro: A&O, makes needs known.    Dressing: Hip incision CDI steri-strips BRII   Diet: Reg    Pt reported having no appetite; therapeutic listening provided; different foods encouraged; meals from home, etc.    Pt on Victoza (appetite suppressant)   LDA: Left PIV removed DT irritation and non use   Equipment: Liko   Plan: Con't POC   Additional Info: Pt reported feeling discouraged about not progressing        Patient's most recent vital signs are:     Vital signs:  BP: 129/73  Temp: 96  HR: 89  RR: 18  SpO2: 96 %     Patient does not have new respiratory symptoms.  Patient does not have new sore throat.  Patient does not have a fever greater than 99.5.

## 2023-01-06 NOTE — PROGRESS NOTES
SPIRITUAL HEALTH SERVICES  SPIRITUAL ASSESSMENT Progress Note  [facility] ([facility]) TCU R 416 01/06/23    REFERRAL SOURCE: Staff Referral    Received  voicemail from Pt's SW. She stated that Pt was difficult to deal with and not complying with PT and OT. Pt stated  he is not liking how the  program is going. He is dissatisfied with OT and PT. He stated he did not know why SW reached out to [facility] knowing he is not spiritual or Oriental orthodox. Pt stated his girlfriend opened his chart and read the notes. He doesn't know why he is the ' problem '  . Pt stated he was not aware of a discharge to another facility on next week. Pt did not want to see the other  whom he connected with. Pt did not want a prayer or spiritual materials. Pt requested that our visit ended immediately.    PLAN: No follow up necessary.    Abraham Thornton MA, MPA  Associate   Pager: [phone]

## 2023-01-06 NOTE — PROGRESS NOTES
CLINICAL NUTRITION SERVICES - REASSESSMENT NOTE     Nutrition Prescription    RECOMMENDATIONS FOR MDs/PROVIDERS TO ORDER:  Continue to encourage oral intakes and provide snacks/supplements PRN    Malnutrition Status:    Severe malnutrition in the context of acute on chronic illness    Recommendations already ordered by Registered Dietitian (RD):  Encouraged oral intakes    Future/Additional Recommendations:  Monitor labs, intakes, and weight trends.     EVALUATION OF THE PROGRESS TOWARD GOALS   Diet: Regular  Snacks/supplements: Nutrisource fiber once per day at dinner mixed into large glass of ice water. Ensure Max once a day at dinner.  Intake/Tolerance: 0-100% of documented meals.     Pt ordering (on average) 630 kcal and 23 g protein per day per [employer]. Ensure daily adds 150 kcal and 30 g protein. With documented intakes is likely meeting 25% minimum energy and protein needs.     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 Course c/b acute encephalopathy (2/2 anesthesia and sedating meds), acute hypoxic respiratory failure (2/2 OHS), acute blood loss anemia, HALEY (prerenal), and physical deconditioning. Transferred to TCU for rehabilitation    Nutrition/GI: Pt states he has not ordered much if anything here but that he is able to get through ~75% of one ensure per day and does not believe he can do more than one per day. Pt does not have his wallet with him so he is not able to order from outside and is not interested in ordering off the snacks/supplement list provided previously. Explained floor stock options, patient stated he may have raisin bran later. Encouraged pt to utilize visitors to bring outside food but patient states he  does not usually have visitors. Explained importance of adequate intakes, took pts weight and pt stated that this was about 50 lb less than his usual weight but he is not upset with that. Explained importance of maintaining muscle mass. Pt stated he has very little muscle left.     Weights: Pt seems weight stable prior to admission, down 60 lb (19%) in ~ 2.5 months.   Wt Readings from Last Encounters:   01/06/22 119.9 kg (264 lb 5.3 oz)   12/30/22 118.5 kg (261 lb 3.2 oz)   11/22/22 147.1 kg (324 lb 4.8 oz)   11/14/22 142.9 kg (315 lb)   11/09/22 146.8 kg (323 lb 9.6 oz)   05/19/22 136.1 kg (300 lb)   07/03/18 148.9 kg (328 lb 4.8 oz)     Labs reviewed      Medications reviewed: lasix, culturell, metformin  IV Fluids?: No    MALNUTRITION  % Intake: </= 50% for >/= 5 days (severe)  % Weight Loss: > 7.5% in 3 months (severe)  Subcutaneous Fat Loss: Upper arm:  Moderate  Muscle Loss: Temporal:  Mild, Upper arm (bicep, tricep):  Severe and Upper leg (quadricep, hamstring):  Moderate-severe  Fluid Accumulation/Edema: Trace-mild  Malnutrition Diagnosis: Severe malnutrition in the context of acute on chronic illness    Previous Goals   Patient to consume % of nutritionally adequate meal trays TID, or the equivalent with supplements/snacks.  Evaluation: Not met    Previous Nutrition Diagnosis  Inadequate oral intake related to multifactorial (altered GI function, decreased appetite, dislike of food) as evidenced by pt reports    Evaluation: No change    CURRENT NUTRITION DIAGNOSIS  Inadequate oral intake related to multifactorial (altered GI function, decreased appetite, dislike of food) as evidenced by pt reports      INTERVENTIONS  Implementation  Nutrition education for nutrition relationship to health/disease     Goals  Patient to consume % of nutritionally adequate meal trays TID, or the equivalent with supplements/snacks.    Monitoring/Evaluation  Progress toward goals will be monitored and evaluated per  protocol.    Dee Alanis MS, RDN, LDN  RD pager: [phone]  WB Weekend/Holiday Pager: [phone]

## 2023-01-06 NOTE — PLAN OF CARE
Goal Outcome Evaluation:      Plan of Care Reviewed With: patient    Overall Patient Progress: no change    Outcome Evaluation: Patient states he has poor appetite with dislike of hospital food. Pt not interested in further nutrition interventions at this time. See RD note for more info.

## 2023-01-06 NOTE — PLAN OF CARE
Goal Outcome Evaluation:       Overall no acute issue noted this shift. Alert and oriented x4. Able to make needs known. Slept through out the night. No acute issue noted. Will continue with POC.       Patient's most recent vital signs are:     Vital signs:  BP: 126/53  Temp: 96  HR: 84  RR: 18  SpO2: 92 %     Patient does not have new respiratory symptoms.  Patient does not have new sore throat.  Patient does not have a fever greater than 99.5.

## 2023-01-06 NOTE — PROGRESS NOTES
ZEB received a call from nephew/Pollo  [phone].  Pollo is trying to help with the MA issues.  Pollo talked to pt and had form for pt to sign stating [facility] could help with everything with MA.  Pollo will fax form here and SW will have pt sign form and fax to [facility] [place].  Pollo said MA was started already Case # [id number].  Pt hasn't send in any proof.  [facility] will work on that.  They only have 10 days and pt will have to apply again. Pt thought placement was found?  SW said no.  SW didn't even look yet as wanted to get MA going first.  Every placement asks what is payor source.  ZEB and Pollo will work together on MA.    SW met with pt.  Pt told Veronika pt didn't need their services.  SW said pt really needs to talk to someone.  Pt said pt is upset at everyone saying pt is not working with therapies. SW told pt no placement has been found yet as we need payor source first. SW will work closely with pt about placement. Pt agreed to have Pollo with pt on MA and placement. Pt signed for the form.    ZEB talked with FERDINAND/Catrachito.  Catrachito gave SW the fax number for [facility]. [phone].  SW faxed [employer] form.   SW called Pollo and told him it was faxed in and gave him fax number for any other forms.     NEREYDA Sharp   [facility], Transitional Care Unit   Social Work   [address] [place] [address], 4th [place] [address]  () [phone]

## 2023-01-06 NOTE — PLAN OF CARE
RN [id number]  Pt is alert and oriented x4. Able to make needs known. Denies chest pain, light headedness, dizziness or SOB. Assist of 2 w/ lift transfer. Declined to get OOB throughout the day. Continent of bowel and bladder. Utilizes urinal. LBM 1/6/23, utilizing bedpan. No new concerns, call light is in reach, continue w/ POC.

## 2023-01-07 NOTE — PROGRESS NOTES
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 Course c/b acute encephalopathy (2/2 anesthesia and sedating meds), acute hypoxic respiratory failure (2/2 OHS), acute blood loss anemia, HALEY (prerenal), and physical deconditioning. Transferred to TCU for rehabilitation     Chronic L hip PJI s/p explantation, debridement, and reconstruction with antibiotic spacer 11/22/22: Per Dr. Meyers. Surgical cultures +for Finegoldia magna and Proteus mirabilis (also had C. acnes in the past but doing well despite no directed treatment). S/p 6 week course Cipro and Flagyl per ID (completed 1/3). Per notes higher risk for treatment failure given comorbidities, sinus tract, and h/o PJI. WBC and CRP normal. Pain is controlled but extremely limited in mobility (requires lift)  - NWB LLE. PT/OT  - Follow up with Dr. Meyers 1/9 as planned. Anticipate antibiotic holiday before considering future reimplantation.   - Continue Tylenol. Decrease oxycodone to 5 mg daily prn given use     DM2: A1C 6.8% 11/23. PTA on glipizide, pioglitazone, metformin, and Trulicity. Currently on Metformin, Actos, Victoza. Glucose stable  - Continue PTA metformin and Actos. On Victoza here as Trulicity is non formulary  - Home glipizide remains on hold; at risk for hypoglycemia as PO intake is below BL       Stable and Resolved Issues:  Physical deconditioning: In the setting of the above. Per PT, OT motivation and progress have been slow  Intermittent nausea: Likely d/t metronidazole. No ongoing concerns noted 1/4. Monitor   JAIR, OHS: Continue CPAP  Acute on  chronic normocytic anemia with acute blood loss: BL hgb around 10, low to 6.3 post-op. S/p 4U RBC in hospital. Hgb now stable at BL. Monitor   HLD: Continue statin  H/o DVT/PE: PTA apixaban held prior to surgery. Started on ASA immediately post-op, then transitioned to apixaban 2.5mg BID x 2 weeks, then restarted full therapeutic dose. Continue apixaban   Venous insufficiency: Chronic edema managed with Lasix. Diuretics held post-op in setting of HALEY. Restarted without issue. No significant edema on exam today. Continue lasix with hold parameters  Non-severe protein calorie malnutrition: In the setting of the above. Encourage oral intake      The above was discuss with patient and attending physician, Dr. Dillon who agrees with the above    CODE: Full Code  DVT: Apixaban  Diet: Regular diet  Indications for Psychotropic Medications: N/A  Disposition: PT 1/24 and OT 2/7; is still requiring lift for transfers. Will meet with Dr. Meyers 1/5 to determine next steps (suspect he will be looking at a 2 month antibiotic holiday before hip can be reimplanted). Has 7 steps so therapy is going to recommend KENDALL as patient/significant other have not been interested in purchasing a chair lift or setting up home for first floor bed/commode    Mae Moseley PA-C  Hospitalist Service  Pager: [phone]  ________________________________________________________________    Subjective & Interval History:    No changes. Continues to feel frustrated with ongoing slow progress with therapies, ongoing TCU stay. Appetite is low due to being sick of food here and not feeling hungry. Willing to try to have more protein shakes. Hip pain at BL. Denies fever or chills. No current concerns     Last 24 hour care team notes reviewed.   ROS: 4 point ROS (including Respiratory, CV, GI and ) was performed and negative unless otherwise noted in HPI.     Medications: Reviewed in EPIC.    Physical Exam:    General Appearance: Alert and oriented x3,  flat affect  HEENT: Anicteric sclera, MMM   Respiratory: Breathing comfortably on room air, lungs CTAB without wheezing or crackles   Cardiovascular: RRR, S1, S2. No murmur noted  GI: Abdomen soft, non-tender with active bowel sounds. No guarding or rebound  Lymph/Hematologic: No peripheral edema, distal pulses palpable   Skin: No rash or jaundice. BLE venous stasis changes  Musculoskeletal: Moves all extremities   Neurologic: No focal deficits, CN II-XII grossly intact      Lines/Tubes/Drains:   Peripheral IV 12/12/22 Anterior;Left Lower forearm (Active)   Site Assessment WDL 12/31/22 2000   Line Status Saline locked 12/31/22 2000   Phlebitis Scale 0-->no symptoms 12/31/22 2000   Infiltration Scale 0 12/31/22 2000   Infiltration Site Treatment Method  None 12/22/22 1700   If infiltrated, was a vesicant infusing? No 12/20/22 1200   Number of days: 23

## 2023-01-07 NOTE — PLAN OF CARE
Patient is alert and oriented x4. No reported Sob and CP. Able to make needs known. A-2 with lift transfer. Continent both bladder and bowel. BM x1 during shift. No complain of pain. No PRN medications given. Slept well over night. Safety rounds done. No acute issue during shift. Continue with plan of care.     Patient's most recent vital signs are:     Vital signs:  BP: 150/58  Temp: 96.8  HR: 98  RR: 18  SpO2: 95 %     Patient does not have new respiratory symptoms.  Patient does not have new sore throat.  Patient does not have a fever greater than 99.5.

## 2023-01-07 NOTE — PLAN OF CARE
Goal Outcome Evaluation:    VS: BP (!) 148/72 (BP Location: Right arm)   Pulse 94   Temp (!) 95.5  F (35.3  C) (Oral)   Resp 18   Wt 119.9 kg (264 lb 5.3 oz)   SpO2 93%   BMI 37.93 kg/m     O2: RA   Output: Uses urinal; staff empties. Output good   Last BM: 1/7, bedpan   Activity: Therapy scheduled; pt not OOB w/staff.  Smartphone, naps between cares  Liko A-2; LLE NWB   Skin: X; groin  Left hip incision   Pain: Oxy x1   CMS Neuro: Intact; NWB to LLE   Dressing: Left hip steri strips, BRII   Diet: Reg, low appetite  Ate cereal for b-fast, half supplemental drink     LDA:    Equipment: Bariatric bed/recliner, Liko lift   Plan: Con't POC.    Additional Info: Pt triggered Sepsis Alert; VSS; LA = 0.7.       Patient's most recent vital signs are:     Vital signs:  BP: 148/72  Temp: 95.5  HR: 94  RR: 18  SpO2: 93 %     Patient does not have new respiratory symptoms.  Patient does not have new sore throat.  Patient does not have a fever greater than 99.5.

## 2023-01-07 NOTE — PLAN OF CARE
Patient is alert and oriented x4, assist of 2 with lift transfer. Patient declined getting out of bed this shift. Bedpan for bowel movement, patient declined commode. Last bowel movement 1/6/23. Urinal at bedside. Patient is continent of bowel and bladder. Patient is able to make needs known and uses the call light appropriately. Continue with the plan of care.    Patient's most recent vital signs are:    Vital signs:  BP: 150/58  Temp: 96.8  HR: 98  RR: 18  SpO2: 95 %    Patient does not have new respiratory symptoms.  Patient does not have new sore throat.  Patient does not have a fever greater than 99.5.

## 2023-01-08 NOTE — PLAN OF CARE
Goal Outcome Evaluation:        Pt is alert and oriented x 4. Able to make needs known to staffs. ;continent of both bowel and bladder. Uses urinal and bedpan for elimination. Pt spent the entire shift in bed. Ate 100% of meal. No PRN medication given this shift. Will continue with POC.      Patient's most recent vital signs are:     Vital signs:  BP: 131/78  Temp: 96  HR: 85  RR: 16  SpO2: 97 %     Patient does not have new respiratory symptoms.  Patient does not have new sore throat.  Patient does not have a fever greater than 99.5.

## 2023-01-08 NOTE — PLAN OF CARE
Patient is alert and oriented x4. No reported shortness of breath and chest pain. Able to make needs known. No complain of any discomfort or pain during shift. No PRN medications given. Slept well overnight. Safety rounds done. No acute issue during shift. Continue with plan of care.      Patient's most recent vital signs are:     Vital signs:  BP: 131/78  Temp: 96  HR: 85  RR: 16  SpO2: 97 %     Patient does not have new respiratory symptoms.  Patient does not have new sore throat.  Patient does not have a fever greater than 99.5.

## 2023-01-08 NOTE — PLAN OF CARE
Goal Outcome Evaluation:    VS: /74 (BP Location: Right arm, Patient Position: Semi-Fuentes's, Cuff Size: Adult Regular)   Pulse 85   Temp (!) 96  F (35.6  C) (Oral)   Resp 16   Wt 119.9 kg (264 lb 5.3 oz)   SpO2 96%   BMI 37.93 kg/m      O2: RA   Output: Uses urinal; staff empties. Output good   Last BM: 1/7, bedpan   Activity: Smartphone, naps between cares  Liko A-2; LLE NWB   Skin: X; groin  Left hip incision   Pain: Present but no pain meds requested   CMS Neuro: Intact; NWB to LLE   Dressing: Left hip steri strips, BRII   Diet: Reg, low appetite     LDA:     Equipment: Bariatric bed/recliner, Liko lift   Plan: Con't POC.    Additional Info:      Patient's most recent vital signs are:     Vital signs:  BP: 134/74  Temp: 96  HR: 85  RR: 16  SpO2: 96 %     Patient does not have new respiratory symptoms.  Patient does not have new sore throat.  Patient does not have a fever greater than 99.5.

## 2023-01-09 NOTE — PROGRESS NOTES
SW received a call from [facility] as pt is on their list. They asked for pt's bedside phone number.  SW gave that to them.  SW also updated them pt is not going home.  Will need placement.     ZEB talked to LINSEY Bergeron/[facility].  Pt would be on EW so SW has to call that one in to be assigned to someone. SW will do that.     NEREYDA Sharp   [facility], Transitional Care Unit   Social Work   [facility] 4th [place] [address]  (PH) [phone]

## 2023-01-09 NOTE — PROGRESS NOTES
"OT update   01/09/23 4817   Appointment Info   Signing Clinician's Name / Credentials (OT) Aga Reddy OTR/L CBIS   Rehab Comments (OT) OT: discussion of POC, coRx PT/OT for mobilty   OT Goals   Therapy Frequency (OT) 6 times/wk   OT Predicted Duration/Target Date for Goal Attainment 01/24/23   Therapeutic Activities   Therapeutic Activity Minutes (46792) 50  (35 coRx/15Rx)   Treatment Detail/Skilled Intervention OT: discussion of POC, pt frustrated w/ results of dr jeter Prior to OT /PT session, specificly frustrated w/ time to \"wait\" to see if infection clears up and the unknowns if pt will be medically appropriate for additional procedures after infection heals, pt agreeable to therapy, pt directed care as OT assist w/ preparing pt for movement bedside, OT:/PT coTreat required skills of both therapists to problem solve approach to standing and to faciliate safe movment, mech lift bed to w/c, th positioned pt appropriately for comfort, safety and to maintain LLE NWB during transport to therapy dept, max A of 3staff for  STS in parallel bars , kolby 15sec each stand, problem solved effective /safe approproate to limit pain and increase success, pt maintained LLE NWB during stands but reported increased pain after each stand, limited by pain and fatigue, returned to room at end of session w/ pt directing placement of equip and mech lift to return pt to bed, assisted pt w/ proper positioning for comfort and alignment in bed.   OT Discharge Planning   OT Plan OT: Pt needs max A to manage LLE during all activity. LLE NWB.  Tx: bed mob, discuss plan for toileting, core/UE strengthening, act tolerance, functional sitting position in recliner for ADLs, coRx w/PT.  To/from EOB (assist of 2 to edge and A of 3 back into bed on 1/6).  Standing in parrallel bars (A of 2 LIKO lift, A of 3 parrallel bars )   OT Discharge Recommendation (DC Rec)   (antic pt will need 24/7 assist due to limited mobiity and NWB status)   OT Brief " overview of current status oT: pt agreeable to leaving room via w/c and standing in parellel bars, pt frustrated by length of time healing process of infections and unknowns, performance limited by pain in L hip and fatigue. cont w/ coRx towork on standing and general mobility needed to promote health, healing and movement.   Total Session Time   Timed Code Treatment Minutes 50   Total Session Time (sum of timed and untimed services) 50   Post Acute Settings Only   What unit is patient on? TCU

## 2023-01-09 NOTE — PLAN OF CARE
Pt A&O x4. A of 2 w/ lift. Pain managed w/ daily PRN Oxy. Cont/incont of bladder. Cont of bowel, LBM 1/9. AM . Declined repositioning. Poor appetite, states food here all tastes the same. Pt went to ortho appt and had xray done today. Results in chart. Continue w/ plan of care.

## 2023-01-09 NOTE — NURSING NOTE
Depression Screening Follow-up    PHQ 1/9/2023   PHQ-9 Total Score 11   Q9: Thoughts of better off dead/self-harm past 2 weeks Not at all       Does the patient currently have a mental health provider?  No  Follow Up Actions Taken  Patient to follow up with PCP. Clinic staff to schedule appointment if able.  Patient declined referral. He says he has a friend who he is able to talk to and does not want a referral placed at this time.      Ruthie Munoz RN

## 2023-01-09 NOTE — LETTER
1/9/2023         RE: Waldo Bustos  [address]  [facility] [address]        Dear Colleague,    Thank you for referring your patient, Waldo Bustos, to the [facility] [place]. Please see a copy of my visit note below.       [facility]  Orthopaedic Surgery, Joint Replacement Consultation  by Rom Meyers M.D.     Waldo Bustos MRN# [id number]     YOB: 1950      Requesting physician: No ref. provider found  Jv Felix, PCP  Rogelio Shi MD [facility]  Vick, MD Rama Parish Albero, [place]      Background history:  DX:  1. Morbid obesity Body mass index is 45.2 kg/m .  2. Diabetes mellitus  3. Left BELLA prosthetic joint infection  4. Hx of PE. Long term anticoag     TREATMENTS:  1. 3/7/2018, left two incision total hip arthroplasty ([facility])  2. 1/23/2019, revision left total hip arthroplasty ([facility]) [facility]. IV Ceftriaxone x 4 weeks for C Acnes  3. 2/2021, L hip aspiration ([facility])  4. 4/6/2022, C Acnes  5. 4/27/2022, drain placement. C Acnes.  6. 11/14/2022 L hip aspiration 3+ Proteus mirabilis , 4+ Porphyromonas sp.  7. 11/22/2022, explantation L BELLA, non-articulating PMMA spacer (Luigi) [facility]. Proteus mirabilis, Finegoldia magna, zosyn x 1 wk > po cipro/po metronidazole x 6 weeks from 11/22/22.        Patient returns to see me and is residing in the [facility] on the [facility].  He has now discontinued all oral antibiotic therapy as of 3 days ago.  He reports no fever or drainage from the left hip.    Examination demonstrates incision is well-healed.  A single suture remaining is removed.  No erythema no fluctuance identified.    X-rays demonstrate the spherical antibiotic space within his acetabulum in place.  The femoral diaphysis is in place as well.    Impression and plan:  1.  Infection appears to resolve clinically.  2.  Check ESR and CRP.  3.  At completion of his 8-week antibiotic holiday, arrange for left hip  aspiration and testing including cell count, differential, alpha defense and synovasure, aerobic and anaerobic cultures and fungal cultures.  4.  Provided all infection work-up is negative, will then have the greenlight to proceed with second stage reimplantation surgery.  Depending upon his medical comorbidities at that time , decision will be made regarding timing of second stage reimplantation revision hip surgery.  I expect a proximal femoral placement device will be necessary.  5.  I have encouraged patient to concentrate and focus on weight loss reduction in effort to reduce the morbidity of his upcoming second stage reimplantation revision hip surgery.  Furthermore he should continue to maintain his diabetic control.  6.  Next follow-up visit after his left hip aspiration has been completed.  7.  I have also encouraged patient to focus on rehabilitation and weightbearing of the right lower extremity while nonweightbearing in the left lower extremity and mobility training.    MD Vanessa Stephenson Family Professor  Oncology and Adult Reconstructive Surgery  Dept Orthopaedic Surgery, [facility]  [phone] office, [phone] pager  [email or web address]    Total combined visit time and work time before and after clinic visit on encounter date = 30 min

## 2023-01-09 NOTE — PLAN OF CARE
Goal Outcome Evaluation:    Pt A&OX4, calm, and pleasant but declined repositioning when offered. No indication of CP, SOB, & n/v. Pt is A of 2 with liko lift for transfer. in bed on pulsate matris. Pt declined to have sling removed due to pain concerns. Continent/incontinent for BM & continent for bladder. Takes med whole with thin liquid. Pt is able to make needs known & call light within reach.    Patient's most recent vital signs are:          Patient's most recent vital signs are:     Vital signs:  BP: 124/76  Temp: 95.8  HR: 70  RR: 18  SpO2: 96 %     Patient does not have new respiratory symptoms.  Patient does not have new sore throat.  Patient does not have a fever greater than 99.5.

## 2023-01-09 NOTE — PLAN OF CARE
Pt returned from  orthopedic appointment @ at 1045, came back with order to check ESR & CRP. See Dr. Luigi Cueto note dated 01/10.

## 2023-01-09 NOTE — PLAN OF CARE
Pt A&OX4, calm, pleasant, & cooperative with care. Denied CP, SOB, & n/v. Pt is A of 2 with liko lift for transfer. Spent most of the shift on a recliner chair. Continent/incontinent for BM & continent for bladder. Takes med whole with thin liquid. Pt is able to make needs known & call light within reach. Will continue with plan of care.    Patient's most recent vital signs are:     Vital signs:  BP: 124/76  Temp: 95.8  HR: 70  RR: 18  SpO2: 96 %     Patient does not have new respiratory symptoms.  Patient does not have new sore throat.  Patient does not have a fever greater than 99.5.

## 2023-01-09 NOTE — PROGRESS NOTES
Occupational therapy progress note based on 1/7 /23 treatment session   01/07/23 8293   Appointment Info   Signing Clinician's Name / Credentials (OT) FREEMAN Bustos   Self-Care/Home Management   Self-Care/Home Mgmt/ADL, Compensatory, Meal Prep Minutes (50351) 45   Treatment Detail/Skilled Intervention Co-tx PT/OT: LIKO lift bed to w/c.  Staff is supporting LLE and assisting with positioning. Parrallel bars for STS, pt can move and control UE's and RLE but needs LLE lowered to the floor.  Pt able to scoot ahead in w/c and with assist of 3 he does a partial stand x2.  LIKO lift back to bed and pt directed cares for repositioning.  Skilled therapy necessary for management of pt's LLE, positioning and physical assist in order for pt to progress with bed mobility, STS and eventual transfers while pt is following his LLE NWB precautions.   OT Discharge Planning   OT Plan OT: Pt needs max A to manage LLE during all activity. LLE NWB.  Tx: bed mob, discuss plan for toileting, core/UE strengthening, act tolerance, functional sitting position in recliner for ADLs, coRx w/PT.  To/from EOB (assist of 2 to edge and A of 3 back into bed on 1/6).  Standing in parrallel bars (A of 2 LIKO lift, A of 3 parrallel bars 1/7).   OT Brief overview of current status Pt was a willing participant in therapy again today.  Good start with standing in the parrallel bars though pt did not think he did very well and was disappointed with his UE strength.  Th showed pt use of tband to target his tricep mm and he reports he knows the exercise (has green tband in his room).  See above under ADL section for the assist of 2-3 needed with LIKO lift and STS.  See updated POC above.  Goals remain appropriate.   Total Session Time   Timed Code Treatment Minutes 45   Total Session Time (sum of timed and untimed services) 45   Post Acute Settings Only   What unit is patient on? TCU

## 2023-01-09 NOTE — NURSING NOTE
"Chief Complaint   Patient presents with     RECHECK     Follow up status post left hip explant with spacer for PJI DOS: 11/22/2022       72 year old  1950    Ht 1.778 m (5' 10\")   Wt 119.7 kg (264 lb)   BMI 37.88 kg/m      Past Surgical History:   Procedure Laterality Date     Cataract       COLONOSCOPY       EGD       IR IVC FILTER PLACEMENT      removed     IRRIGATION AND DEBRIDEMENT HIP, PLACE ANTIBIOTIC CEMENT BEADS / SPACER Left 11/22/2022    Procedure: and Reconstruction Using G 20 Prosthetic Antibiotic Cement Spacer;  Surgeon: Rom Meyers MD;  Location: [facility] OR     REMOVE HARDWARE ARTHROPLASTY HIP Left 11/22/2022    Procedure: Explantation of Chronic  Infected Left Total Hip Arthroplasty, Extended Trochanteric Osteotomy with Extensive Debridement;  Surgeon: Rom Meyers MD;  Location: [facility] OR     [facility] PELVIS/HIP JOINT SURGERY UNLISTED       [facility] STOMACH SURGERY PROCEDURE UNLISTED  1954    2 Hernia surgeries as a child                   Pain Assessment  Patient Currently in Pain: Denies              [id number] - [place], [place] - [address] [facility] AT [address] [facility]        Allergies   Allergen Reactions     Sulfa Drugs      Other reaction(s): *Unknown - Childhood Rxn     Tizanidine Other (See Comments)     Frequent urination; causes drowsiness, and dry mouth             No current facility-administered medications for this visit.     No current outpatient medications on file.     Facility-Administered Medications Ordered in Other Visits   Medication     - Skin Test Reading -     acetaminophen (TYLENOL) tablet 1,000 mg     apixaban ANTICOAGULANT (ELIQUIS) tablet 5 mg     atorvastatin (LIPITOR) tablet 40 mg     calcium carbonate (TUMS) chewable tablet 500 mg     glucose gel 15-30 g    Or     dextrose 50 % injection 25-50 mL    Or     glucagon injection 1 mg     furosemide (LASIX) tablet 20 mg     lactobacillus rhamnosus (GG) (CULTURELL) capsule 1 capsule     liraglutide " (VICTOZA) injection 1.8 mg     metFORMIN (GLUCOPHAGE XR) 24 hr tablet 2,000 mg     miconazole (MICATIN) 2 % powder     naloxone (NARCAN) injection 0.2 mg    Or     naloxone (NARCAN) injection 0.4 mg    Or     naloxone (NARCAN) injection 0.2 mg    Or     naloxone (NARCAN) injection 0.4 mg     Nurse may request from Pharmacy a change of form of medication (e.g. Liquid to tablet).     ondansetron (ZOFRAN ODT) ODT tab 4 mg     oxyCODONE (ROXICODONE) tablet 5 mg     Patient is already receiving anticoagulation with heparin, enoxaparin (LOVENOX), warfarin (COUMADIN)  or other anticoagulant medication     pioglitazone (ACTOS) tablet 45 mg     polyethylene glycol (MIRALAX) Packet 17 g     prochlorperazine (COMPAZINE) tablet 5 mg     senna-docusate (SENOKOT-S/PERICOLACE) 8.6-50 MG per tablet 2 tablet     tuberculin injection 5 Units             Questionnaires:    HOOS Hip Dysfunction & Osteoarthritis Outcome Questionnaire    Hip Dysfunction & Osteoarthritis Outcome Score (HOOS), English Version LK 2.0 (Tenzin Billy, Milly WILLIAM, 2003) 11/14/2022   S1. Do you feel grinding, hear clicking or any other type of noise from your hip? Rarely   S2. Difficulties spreading legs wide apart Moderate   S3. Difficulties to stride out when walking Extreme   S4. How severe is your hip joint stiffness after first wakening in the morning? Extreme   S5. How severe is your hip stiffness after sitting, lying or resting LATER IN THE DAY? Moderate   Symptom Count 5   Symptom Sum 13   Symptom Mean 2.6   Symptom Subscale Score 35   P1. How often is your hip painful? Always   P2. Straightening your hip fully Severe   P3. Bending your hip FULLY Severe   P4. Walking on a flat surface Extreme   P5. Going up or down stairs Extreme   P6. At night while in bed Moderate   P7. Sitting or lying Moderate   P8. Standing upright Moderate   P9. Walking on a hard surface (asphalt, concrete, etc.) Severe   P10. Walking on an uneven surface Extreme   Pain  Count 10   Pain Sum 31   Pain Mean 3.1   Pain Subscale Score 22.5   A1. Descending stairs Severe   A2. Ascending stairs Severe   A3. Rising from sitting Severe   A4. Standing Moderate   A5. Bending to the floor/ an object Severe   A6. Walking on a flat surface Extreme   A7. Getting in/out of car Severe   A8. Going shopping Severe   A9. Putting on socks/stockings Moderate   A10. Rising from bed Extreme   A11. Taking off socks/stockings Moderate   A12. Lying in bed (turning over, maintaining hip position) Severe   A13. Getting in/out of bed Severe   A14. Sitting Moderate   A15. Getting on/off toilet Moderate   A16. Heavy domestic duties (moving heavy boxes, scrubbing floors, etc.) Extreme   A17. Light domestic duties (cooking, dusting, etc.) Severe   ADL Count 17   ADL Sum 49   ADL Mean 2.88   ADL Subscale Score 27.94   SP1. Squatting Extreme   SP2. Running Extreme   SP3. Twisting/pivoting on loaded leg Extreme   SP4. Walking on uneven surface Extreme   Sports/Rec Count 4   Sports/Rec Sum 16   Sports/Rec Mean 4   Sports/Rec Subscale Score 0   Q1. How often are you aware of your hip problem? Constantly   Q2. Have you modified you life style to avoid activities potentially damaging to your hip? Totally   Q3. How much are you troubled with lack of confidence in your hip? Extremely   Q4. In general, how much difficulty do you have with your hip? Extreme   QOL Count 4   QOL Sum 16   QOL Mean 4   Quality of Life Subscale Score 0              KOOS Knee Survey Assessment    No flowsheet data found.           Promis 10 Assessment    PROMIS 10 11/14/2022   In general, would you say your health is: Poor   In general, would you say your quality of life is: Poor   In general, how would you rate your physical health? Fair   In general, how would you rate your mental health, including your mood and your ability to think? Poor   In general, how would you rate your satisfaction with your social activities and relationships?  Poor   In general, please rate how well you carry out your usual social activities and roles Fair   To what extent are you able to carry out your everyday physical activities such as walking, climbing stairs, carrying groceries, or moving a chair? A little   How often have you been bothered by emotional problems such as feeling anxious, depressed or irritable? Often   How would you rate your fatigue on average? Severe   How would you rate your pain on average?   0 = No Pain  to  10 = Worst Imaginable Pain 8   In general, would you say your health is: 1   In general, would you say your quality of life is: 1   In general, how would you rate your physical health? 2   In general, how would you rate your mental health, including your mood and your ability to think? 1   In general, how would you rate your satisfaction with your social activities and relationships? 1   In general, please rate how well you carry out your usual social activities and roles. (This includes activities at home, at work and in your community, and responsibilities as a parent, child, spouse, employee, friend, etc.) 2   To what extent are you able to carry out your everyday physical activities such as walking, climbing stairs, carrying groceries, or moving a chair? 2   In the past 7 days, how often have you been bothered by emotional problems such as feeling anxious, depressed, or irritable? 4   In the past 7 days, how would you rate your fatigue on average? 4   In the past 7 days, how would you rate your pain on average, where 0 means no pain, and 10 means worst imaginable pain? 8   Global Mental Health Score 5   Global Physical Health Score 8   PROMIS TOTAL - SUBSCORES 13   Some recent data might be hidden              Ortho [place] Knee Questionnaire    No flowsheet data found.

## 2023-01-09 NOTE — PROGRESS NOTES
Status at Admission - 12/27/22 Status at Last Update - 1/3/23 Current Status - 1/9/23   Bed Mobility   Resident refused to perform, partially rolling to L with A x 2   Unchanged   Sup to sit at edge of bed, head of bed raised and w/use of strap at foot of bed for pulling to upright posture. max A at LLE throughout session, min/mod at trunk. v/c for sequencing and max encourgement for scooting to EOB. Pt required extensive time to perform this task   Transfer   Liko lift   Liko lift   Mod A x 3 to  // bars, able to stand twice ~20 sec   Gait   Unable given NWB status   Unable given NWB status   Unable given NWB status   Stairs   Unable given NWB status   Unable given NWB status   Unable given NWB status   Wheelchair Propulsion   Untested   Pt unable to tolerate sitting in wc for any length of time due to hip pain   With placement of towel rolls pt able to tolerate wc for short time but is dependent for propulsion         Assessment of Progress Since Last Update: Pt has been able to tolerate wc to get to gym. Pt requiring co treatments with OT, needing skilled A x 2 for bed mobility, wc positioning and initiation of sit to stands. Pt is still very limited by pain.   Barriers to Progress: On going pain and non weight bearing status on left   Proposed Solutions to Improve Barriers: Therapy will continue to encourage and approach pt on the purpose and necessity of therapy to improve function. Medical team is managing pain.    Justification for Continued Therapy Services: Pt requires further skilled therapy in order to ensure safety with transfers, currently pt is using a lift. Pt's long term healing outcomes will improve if he participates fully in therapy session as well as expected pain reduction.   Additional Considerations for Safe and Efficient Discharge: Pt is unlikely to return to prior living situation given history of participation in therapy and current function. Alternate discharge environment will  need to be found to accommodate pt's debility.

## 2023-01-10 NOTE — PLAN OF CARE
Goal Outcome Evaluation:  BP (!) 148/74 (BP Location: Right arm)   Pulse 80   Temp (!) 96  F (35.6  C) (Oral)   Resp 18   Wt 119.9 kg (264 lb 5.3 oz)   SpO2 95%   BMI 37.93 kg/m    Iso:  No active isolations  Diet: Regular Diet Adult  Snacks/Supplements Adult: Nutrisource Fiber; With Meals  Snacks/Supplements Adult: Ensure Max Protein (bariatric); Between Meals  Mental Status:     Main Acuity Score: 140.75  O2:   RA  Mg+:    K:     PLT: 339 (01/09 1212)  HGB: 11.4 (01/09 1212)  BS: 98 (01/09 1659)  No components found for: TROPL   Infusions:None    Patient does not have new respiratory symptoms.  Patient does not have new sore throat.  Patient does not have a fever greater than 99.5      Pt denies having chest pain,SOB,chills and headache. No acute concerns at the moment.Pt repositioned as needed.Continue with POC

## 2023-01-10 NOTE — PROGRESS NOTES
ZEB received a call from Alyssa/Pollo.  He said application got denied but it was the wrong one anyway.  He will fax over another application.  Please have pt sign it and send in and email him a copy.  Please help pt look for pt's wallet.  MA needs a copy of his Medicare Card.     SW received application and took into pt.     TCU Notice of Medicare Non-Coverage Note:     Met with patient to Introduce self and role.     Discussed Notice of Medicare Non-Coverage and last day of coverage as required for all patients with Medicare coverage during Skilled Nursing Facility (SNF) stays.Informed patient/family that Medicare covers stay until midnight of day before discharge. TCU does not bill for discharge date.    Last coverage day is 23. Discharge date expected unknown .    Opportunity provided for questions and education provided regarding potential financial impact to patient.     Patient verbalize(s) understanding of discussion.  Pt wasn't very happy.  Pt signed [place] application and [employer]. ZEB asked pt where wallet was as Pollo need to send to MA for proof.  Pt said it could be at home.  ZEB said maybe the award letter would work.  Pt said where he thought that was.    We talked about prepaying for  while pt still had the funds.  Pt said he didn't care.  No family to have a  for. We talked awhile about that.   ZEB faxed application to [facility] and emailed completed copy to Pollo.        NEREYDA Sharp   [facility], Transitional Care Unit   Social Work   [facility] [address] [place] [address], [address]th [place] [address] () [phone]

## 2023-01-10 NOTE — PROGRESS NOTES
ZEB called [facility] (ph: [phone]) to request [employer] Assessment to see if patient may qualify for wavier services. ZEB spoke with Yany and completed intake for the assessment. Yany reported that she would be passing this off to their SNF liaison who should be calling TCU [facility] to schedule assessment soon. Yany does not have /liaison's schedule so could not say when this would be but noted that their liaison was typically quicker to get assessments scheduled for those in SNFs. If other questions come up before hearing from [facility] /liaison, ZEB can call Yany at [phone].     Justina Lutz ZEB  [facility]   [facility]

## 2023-01-10 NOTE — PLAN OF CARE
Goal Outcome Evaluation:    Pt triggered Sepsis Alert. LA = 0.9. VSS.   Pt participated in therapy. Liko A-2 w/staff.  LLE NWB  No new concerns noted.   . Appetite low.   BM 1/10 via bedpan.   Con't POC.     Patient's most recent vital signs are:     Vital signs:  BP: 137/62  Temp: 95.8  HR: 93  RR: 18  SpO2: 95 %     Patient does not have new respiratory symptoms.  Patient does not have new sore throat.  Patient does not have a fever greater than 99.5.

## 2023-01-10 NOTE — PLAN OF CARE
Patient is alert and oriented x4. Denies any CP and SOB. Able to make needs known. Requested to be lifted by lyko lift to use bedpan as he complain of feeling having a huge bowel movement. BM 1x moderate amount during shift. No complain pain or discomfort. No PRN medications given. Slept well over night. No acute issue during shift. Continue with plan of care.      Patient's most recent vital signs are:     Vital signs:  BP: 148/74  Temp: 96  HR: 80  RR: 18  SpO2: 95 %     Patient does not have new respiratory symptoms.  Patient does not have new sore throat.  Patient does not have a fever greater than 99.5.

## 2023-01-11 NOTE — PROGRESS NOTES
SW met with pt to look for his wallet and find cards.  SW found them in bags of belongings.  SW emailed card to nephew/Pollo.     NEREYDA Sharp   [facility], Transitional Care Unit   Social Work   [address] [place] [address], [address]th [place] [address]  (PH) [phone]

## 2023-01-11 NOTE — PLAN OF CARE
Goal Outcome Evaluation:    VSS.  Pt participated in therapy. Dennis A-2 w/staff.  LLE NWB  No new concerns noted.   . Appetite low. Pt reported will order Davanni's today.   Oxy x1 post therapy.   BM 1/10 via bedpan.   Pt's groin, left thigh, and buttocks needs washing; Pt prefers the cream over the powder; pt has Triad paste in window area.   pt needs shower and is in agreement. Will leave Sticky Note w/ OT.     Con't POC.       Patient's most recent vital signs are:     Vital signs:  BP: 133/66  Temp: 96.2  HR: 84  RR: 18  SpO2: 94 %     Patient does not have new respiratory symptoms.  Patient does not have new sore throat.  Patient does not have a fever greater than 99.5.

## 2023-01-11 NOTE — PROGRESS NOTES
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 Hospital stay was c/b acute encephalopathy (attributed to anesthesia and sedating meds), acute hypoxic respiratory failure (attributed to obesity hypoventilation syndrome), acute blood loss anemia, HALEY (thought pre renal), and profound physical deconditioning. Transferred to TCU for ongoing rehabilitation.      #Chronic left hip PJI s/p explantation, debridement, and reconstruction with antibiotic spacer (11/22/22) - Dr. Meyers. Surgical cultures grew Finegoldia magna and Proteus mirabilis (also had C. acnes in the past but doing well despite no directed treatment of this). S/p 6 wk course of PO Cipro and Flagyl per ID ended 1/3 - noted potential higher risk for treatment failure given comorbidities, sinus tract, and history of PJI. WBC and CRP have normalized. Pain is controlled but extremely limited in mobility. Saw Dr. Meyers 1/9 and will pursue second stage reimplantation surgery pending 8 wk antibiotic holiday and no recurrence of infection.   - NWB LLE. PT/OT. Patient is being encouraged to focus on rehab and weight bearing in RLE while NWB LLE.   - Continue PRN Tylenol, rare oxycodone use.   - After 8 wk antibiotic holiday (~3/1) will need to arrange for left hip aspiration and testing including cell count, differential, alpha defense and synovasure, aerobic/anaerobic/fungal cultures. Provided all of this infectious workup is negative, would then proceed with second stage reimplantation surgery.      #DM2 -  Well controlled at baseline. HgbA1C  6.8% on 11/23. PTA regimen includes glipizide, pioglitazone, metformin, and Trulicity. BG controlled.   - Continue home metformin and Actos.   - On Victoza here as Trulicity is non formulary.   - Home glipizide remains on hold but could reintroduce when needed for BG control as long as PO intake is consistent.      #JAIR - Continue home CPAP.     #Hx DVT/PE - Continue apixaban 5 mg BID.     #Venous insufficiency -  Chronic lower extremity edema managed with Lasix. Diuretics held post-op in setting of HALEY. Restarted without issue. No significant edema on exam today.  - Continue Lasix 20 mg daily.    #Non Severe Protein Calorie Malnutrition - Dietitian following. Patient was counseled by Dr. Meyers to focus on weight loss in effort to reduce morbidity of upcoming second stage reimplantation revision hip surgery.     CODE: full  DVT: apixaban  Diet: regular  Indications for Psychotropic Medications: N/A  Disposition: PT 1/24. SW following for USP placement.     Liliya Lai PA-C  Hospitalist Service  Pager: [phone]  ________________________________________________________________    Subjective & Interval History:  Don is doing ok. He continues to have very poor mobility. Pain is controlled. No f/c. No nausea. Having at least daily stool.     Last 24 hour care team notes reviewed.   ROS: 4 point ROS (including Respiratory, CV, GI and ) was performed and negative unless otherwise noted in HPI.     Medications: Reviewed in EPIC.    Physical Exam:    Blood pressure 136/76, pulse 87, temperature 96.9  F (36.1  C), temperature source Oral, resp. rate 18, weight 119.9 kg (264 lb 5.3 oz), SpO2 91 %.    GENERAL: Alert and oriented x 3. Lying in bed, appears comfortable. Pleasant and conversant.   HEENT: Anicteric sclera. Mucous membranes moist.   CV: RRR. S1, S2. No murmurs appreciated.   RESPIRATORY: Effort normal on room air. Lungs CTAB with no wheezing, rales, rhonchi.   GI: Abdomen obese, soft, non tender.    NEUROLOGICAL: No focal deficits. Moves all extremities.    EXTREMITIES: No peripheral edema.   SKIN: No jaundice. No rashes. BLE venous stasis changes.     Lines/Tubes/Drains: none

## 2023-01-11 NOTE — PLAN OF CARE
Goal Outcome Evaluation:    Pt is alert and oriented x4, able to make his needs known and uses the call light appropriately. Pt is calm and pleasant with care. No indication of CP, SOB, & n/v. Pt is A2 with liko lift for transfer. Safety rounds completed.       Patient's most recent vital signs are:     Vital signs:  BP: 116/46  Temp: 96  HR: 89  RR: 18  SpO2: 92 %     Patient does not have new respiratory symptoms.  Patient does not have new sore throat.  Patient does not have a fever greater than 99.5.

## 2023-01-12 NOTE — PLAN OF CARE
Patient is alert and oriented x4, assist of 2 with lift transfer. Bedpan for bowel movement, last bowel movement 1/10/23. Urinal at bedside. Patient is continent of bowel and bladder. Patient is able to make needs known and uses the call light appropriately. Continue with the plan of care.    Patient's most recent vital signs are:    Vital signs:  BP: 156/81  Temp: 97.3  HR: 85  RR: 18  SpO2: 91 %    Patient does not have new respiratory symptoms.  Patient does not have new sore throat.  Patient does not have a fever greater than 99.5.

## 2023-01-12 NOTE — PROGRESS NOTES
ZEB met with pt.  Pt did find his new medicare card.  ZEB sent that to nephew.      NEREYDA Sharp   [facility], Transitional Care Unit   Social Work   [facility], [address] [place] [address]  (PH) [phone]

## 2023-01-12 NOTE — PLAN OF CARE
Pt A&OX4, calm, pleasant, & cooperative with care. Denied CP, SOB, & n/v. Pt is A of 2 with liko lift for transfer. Continent/incontinent for BM & continent for bladder; urinal at the bedside. Takes med whole with thin liquid. Pt slept well throughout the night. Able to make needs known & call light within reach. Will continue with plan of care.    Patient's most recent vital signs are:     Vital signs:  BP: 156/81  Temp: 97.3  HR: 85  RR: 18  SpO2: 91 %     Patient does not have new respiratory symptoms.  Patient does not have new sore throat.  Patient does not have a fever greater than 99.5.

## 2023-01-12 NOTE — PLAN OF CARE
"      VS: /76 (BP Location: Right arm, Patient Position: Supine, Cuff Size: Adult Large)   Pulse 87   Temp 96.9  F (36.1  C) (Oral)   Resp 18   Wt 119.9 kg (264 lb 5.3 oz)   SpO2 91%   BMI 37.93 kg/m     O2: RA   Output: 400 ML   Last BM: 1/12/13   Activity: Assist of 2 with liko lift.    Skin: CDI   Pain: No c/o of pain during this shift.   CMS: A/O X 4. Able to make needs known. Pt NWB on LLE.   Dressing: None    Diet: Regular diet with poor appit   LDA: N/A   Equipment: Liko lift, bariatric bed.   Plan: Will continue with POC.   Additional Info: Pt on BG monitor with metformin. Did not get of bed this shift assisted on bed pan once this afternoon. Pt has poor meals intake. RN encouraged to order meals, but pt declined. Stated \" I had a big dinner last night so I am good. I don't feel hungry.\" Pt drank a box of ensure in his room. BG readings where with NLM. Pt denies SOB or chest pain. Will continue to follow plan of care.                 Patient's most recent vital signs are:     Vital signs:  BP: 136/76  Temp: 96.9  HR: 87  RR: 18  SpO2: 91 %     Patient does not have new respiratory symptoms.  Patient does not have new sore throat.  Patient does not have a fever greater than 99.5.                       Goal Outcome Evaluation:                        "

## 2023-01-13 NOTE — PLAN OF CARE
Goal Outcome Evaluation:    Pt is alert and oriented x4, able to make his needs known. NWB to LLE. Ax2 with liko lift for transfer.   C/o left hip pain w/ movement, prn oxycodone 5mg given at 1334 per request.  No new care concerns per pt.    Continent of urine, pt used urinal.   Call light is within reach. Continue with POC.        Patient's most recent vital signs are:      Vital signs:  BP: 132/73  Temp: 96.8  HR: 81  RR: 18  SpO2: 97 %      Patient does not have new respiratory symptoms.  Patient does not have new sore throat.  Patient does not have a fever greater than 99.5.

## 2023-01-13 NOTE — PLAN OF CARE
Goal Outcome Evaluation:    Pt is alert and oriented x4, able to make his needs known and uses the call light appropriately. Pt is calm and pleasant with care. No indication of CP, SOB, & n/v. Pt is A2 with liko lift for transfer. Safety rounds completed.      Patient's most recent vital signs are:     Vital signs:  BP: 136/67  Temp: 96.9  HR: 84  RR: 18  SpO2: 97 %     Patient does not have new respiratory symptoms.  Patient does not have new sore throat.  Patient does not have a fever greater than 99.5.

## 2023-01-13 NOTE — PROGRESS NOTES
CLINICAL NUTRITION SERVICES - REASSESSMENT NOTE     Nutrition Prescription    RECOMMENDATIONS FOR MDs/PROVIDERS TO ORDER:  Continue to encourage oral intakes and provide snacks/supplements PRN    Malnutrition Status:    Severe malnutrition in the context of acute on chronic illness    Recommendations already ordered by Registered Dietitian (RD):  Encouraged oral intakes    Future/Additional Recommendations:  Monitor labs, intakes, and weight trends.     EVALUATION OF THE PROGRESS TOWARD GOALS   Diet: Regular  Snacks/supplements: Nutrisource fiber once per day at dinner mixed into large glass of ice water. Ensure Max once a day at dinner.  Intake/Tolerance: % of documented meals.     Pt ordering 1 meal per day per health touch     NEW FINDINGS/REVIEW OF SYSTEMS    PMH: Hx of DM2, HLD, JAIR, DVT/PE on chronic apixaban, morbid obesity, venous insufficiency, OA, and chronic L hip prosthetic joint infection who was admitted to [facility] 11/22/22 for scheduled explantation of L hip prosthesis, debridement, and reconstruction with antibiotic cement spacer. Course c/b acute encephalopathy (2/2 anesthesia and sedating meds), acute hypoxic respiratory failure (2/2 OHS), acute blood loss anemia, HALEY (prerenal), and physical deconditioning. Transferred to TCU for rehabilitation    Nutrition/GI: Patient states that he only really feels up to one meal and one ensure a day. Has been trying multiple menu items but feels that nothing tastes right unless it is pre-packaged. Discussed adding a snack. Commended pt for doing his best to try a variety of foods. Pt admits he is a picky eater but is trying some foods he thought would taste good (green beans) which were not good. Pt declined further supplements at this time.      Weights: Pt seems weight stable prior to admission, down 60 lb (19%) in ~1.5 months.   Wt Readings from Last Encounters:   01/09/22 119.7 kg (264 lb)   01/06/22 119.9 kg (264 lb 5.3 oz)   12/30/22 118.5 kg  (261 lb 3.2 oz)   11/22/22 147.1 kg (324 lb 4.8 oz)   11/14/22 142.9 kg (315 lb)   11/09/22 146.8 kg (323 lb 9.6 oz)   05/19/22 136.1 kg (300 lb)   07/03/18 148.9 kg (328 lb 4.8 oz)     Labs reviewed      Medications reviewed: lasix, culturell, metformin, pioglitazone  IV Fluids?: No    MALNUTRITION  % Intake: </= 50% for >/= 5 days (severe)  % Weight Loss: > 7.5% in 3 months (severe)   Subcutaneous Fat Loss: Upper arm:  Moderate-severe  Muscle Loss: Temporal:  Mild, Upper arm (bicep, tricep):  Severe and Upper leg (quadricep, hamstring):  Moderate-severe  Fluid Accumulation/Edema: Trace-mild  Malnutrition Diagnosis: Severe malnutrition in the context of acute on chronic illness    Previous Goals   Patient to consume % of nutritionally adequate meal trays TID, or the equivalent with supplements/snacks.  Evaluation: Not met    Previous Nutrition Diagnosis  Inadequate oral intake related to multifactorial (altered GI function, decreased appetite, dislike of food) as evidenced by pt reports    Evaluation: No change    CURRENT NUTRITION DIAGNOSIS  Inadequate oral intake related to multifactorial (altered GI function, decreased appetite, dislike of food) as evidenced by pt reports     INTERVENTIONS  Implementation  Nutrition education for nutrition relationship to health/disease    Goals  Patient to consume % of nutritionally adequate meal trays TID, or the equivalent with supplements/snacks.    Monitoring/Evaluation  Progress toward goals will be monitored and evaluated per protocol.    Dee Alanis MS, RDN, LDN  RD pager: [phone]  WB Weekend/Holiday Pager: [phone]

## 2023-01-13 NOTE — PLAN OF CARE
Goal Outcome Evaluation:    Patient is alert and oriented x 4. He is able to make his needs known. Pt denied SOB and chest pain. He is able to swallow without difficulty but prefers to take meds with apple sauce. Pt refused Micatin powder.        Patient's most recent vital signs are:     Vital signs:  BP: 136/67  Temp: 96.9  HR: 84  RR: 18  SpO2: 97 %     Patient does not have new respiratory symptoms.  Patient does not have new sore throat.  Patient does not have a fever greater than 99.5.

## 2023-01-14 NOTE — PLAN OF CARE
"  Problem: Plan of Care - These are the overarching goals to be used throughout the patient stay.    Goal: Plan of Care Review  Description: The Plan of Care Review/Shift note should be completed every shift.  The Outcome Evaluation is a brief statement about your assessment that the patient is improving, declining, or no change.  This information will be displayed automatically on your shift note.  Outcome: Not Progressing  Flowsheets (Taken 1/13/2023 1845)  Plan of Care Reviewed With: patient  Overall Patient Progress: no change     Problem: Plan of Care - These are the overarching goals to be used throughout the patient stay.    Goal: Patient-Specific Goal (Individualized)  Description: You can add care plan individualizations to a care plan. Examples of Individualization might be:  \"Parent requests to be called daily at 9am for status\", \"I have a hard time hearing out of my right ear\", or \"Do not touch me to wake me up as it startles me\".  Outcome: Not Progressing     Problem: Plan of Care - These are the overarching goals to be used throughout the patient stay.    Goal: Readiness for Transition of Care  Outcome: Not Progressing  Flowsheets (Taken 1/13/2023 1845)  Transportation Anticipated: family or friend will provide  Concerns to be Addressed:    medication    discharge planning  Intervention: Mutually Develop Transition Plan  Recent Flowsheet Documentation  Taken 1/13/2023 1845 by Juaquin Gillespie RN  Transportation Anticipated: family or friend will provide  Concerns to be Addressed:    medication    discharge planning     Problem: Plan of Care - These are the overarching goals to be used throughout the patient stay.    Goal: Readiness for Transition of Care  Intervention: Mutually Develop Transition Plan  Recent Flowsheet Documentation  Taken 1/13/2023 1845 by Juaquin Gillespie RN  Transportation Anticipated: family or friend will provide  Concerns to be Addressed:    medication    discharge planning   "   Problem: Hip Arthroplasty  Goal: Effective Bowel Elimination  Outcome: Not Progressing     Problem: Hip Arthroplasty  Goal: Acceptable Pain Control  Outcome: Progressing   Goal Outcome Evaluation:      Plan of Care Reviewed With: patient    Overall Patient Progress: no changeOverall Patient Progress: no change   /70 (BP Location: Right arm)   Pulse 85   Temp (!) 95.7  F (35.4  C) (Oral)   Resp 18   Wt 119 kg (262 lb 6.4 oz)   SpO2 95%   BMI 37.65 kg/m    Iso:  No active isolations  Diet: Regular Diet Adult  Snacks/Supplements Adult: Nutrisource Fiber; With Meals  Snacks/Supplements Adult: Ensure Max Protein (bariatric); Between Meals  Mental Status:     Main Acuity Score: 123  O2:     Mg+:    K:     PLT:    HGB:    BS: 160 (01/13 2100)  No components found for: TROPL   Infusions:   Pt is alert and oriented x 4. Denies having chest pain,SOB,chills ,headache and nausea. Refuses to reposition. Continue with POC.  Patient does not have new respiratory symptoms.  Patient does not have new sore throat.  Patient does not have a fever greater than 99.5.

## 2023-01-14 NOTE — PLAN OF CARE
Problem: Skin Injury Risk Increased  Goal: Skin Health and Integrity  Description: Perform a full pressure injury risk assessment, as indicated by screening, upon admission to care unit.    Reassess skin (full inspection and injury risk, including skin temperature, consistency and color) frequently (e.g., scheduled interval, with change in condition) to provide optimal early detection and prevention.    Maintain adequate tissue perfusion (e.g., encourage fluid balance; avoid crossing legs, constrictive clothing or devices) to promote tissue oxygenation.    Maintain head of bed at lowest degree of elevation tolerated, considering medical condition and other restrictions. Use positioning supports to prevent sliding and friction. Consider low friction textiles    Outcome: Not Progressing  Intervention: Optimize Skin Protection  Recent Flowsheet Documentation  Taken 1/14/2023 1243 by Jewell Lynn RN  Activity Management: activity adjusted per tolerance  Head of Bed (HOB) Positioning: HOB at 20-30 degrees     Problem: Plan of Care - These are the overarching goals to be used throughout the patient stay.    Goal: Absence of Hospital-Acquired Illness or Injury  Intervention: Prevent Skin Injury  Recent Flowsheet Documentation  Taken 1/14/2023 1243 by Jewell Lynn, RN  Body Position: position changed independently   Goal Outcome Evaluation:  Pt stayed on his bed, declined to sit on his chair.Sleep most of the shift. Incontinent/Continent of bowel and bladder. Patient has Blanchable redness on his bottom, small excoriation on his scrotum which patient prefers to apply barrier cream after cleaning. Abdominal folds is clean, dry and intact, refused for Micatin powder.Tried to convince to sit on his chair

## 2023-01-15 NOTE — CARE PLAN
Pt is alert and oriented x4, able to make his needs known and uses the call light appropriately. Pt is calm and pleasant with care. No indication of CP, SOB, & n/v. Pt is A2 with liko lift for transfer. Safety rounds completed.        Patient's most recent vital signs are:     Vital signs:  BP: 128/79  Temp: 96.2  HR: 85  RR: 18  SpO2: 95 %     Patient does not have new respiratory symptoms.  Patient does not have new sore throat.  Patient does not have a fever greater than 99.5.

## 2023-01-15 NOTE — PLAN OF CARE
Problem: Range of Motion Impairment  Goal: Optimal Range of Motion  Outcome: Not Progressing  Intervention: Maintain Functional Joint Range Position  Recent Flowsheet Documentation  Taken 1/15/2023 1434 by Jewell Lynn, RN  Positioning/Transfer Devices:    pillows    in use     Problem: Depression  Goal: Improved Mood  Outcome: Not Progressing   Goal Outcome Evaluation:  Pt did not order food for breakfast and lunch. Tried to convince patient to have ensure, refused stated that the taste does not good. Spoke with patient and stated there are another flavor of Ensure he agreed anf put a sticky note with Nutritionist via Danielle. Educated patient to let staff help him in turning and sit on his recliner every meal for the integrity of skin and prevention of skin breakdown. Buttocks is noted to be blanchable redness, excoriation on his scrotum applied barrier cream. Patient refused for the Micatin powder.Had a shower. Continue with current plan of care.    Blood pressure (!) 146/91, pulse 80, temperature (!) 96.5  F (35.8  C), temperature source Oral, resp. rate 18, weight 117.5 kg (259 lb), SpO2 97 %.

## 2023-01-15 NOTE — PROGRESS NOTES
Brief Medicine Note    Re: sticky note from RN regarding patient refusing Mantoux. Already aware of this - patient had negative quant gold here on 12/29/22 so nothing further needs to be done.     Liliya Lai PA-C  Hospitalist Service  Contact information available via [facility] Paging/Directory

## 2023-01-16 NOTE — CARE PLAN
Pt is alert and oriented x4, able to make his needs known and uses the call light appropriately. Pt is calm and pleasant with care. No indication of CP, SOB, & n/v. Pt is A2 with liko lift for transfer. Pt had LBM  today.  Safety rounds completed.       Patient's most recent vital signs are:     Vital signs:  BP: 146/91  Temp: 96.5  HR: 80  RR: 18  SpO2: 97 %     Patient does not have new respiratory symptoms.  Patient does not have new sore throat.  Patient does not have a fever greater than 99.5.

## 2023-01-16 NOTE — PLAN OF CARE
Occupational Therapy Discharge Summary    Reason for therapy discharge:    Pt is not progressing    Progress towards therapy goal(s). See goals on Care Plan in [facility] electronic health record for goal details.  Goals not met.  Barriers to achieving goals:   limited tolerance for therapy.pain and LLE NWB barriers to progressing    Therapy recommendation(s):    Pt limited in mobility and adls due to large habitus, LLE pain and NWB status resulting in pt not making functional gains. Recommend  discharge setting w/ 24 /7 assist [facility] and [facility]. Recommend orders to re assess if WB status changes and pt appears appropriate for Occupational therapy.

## 2023-01-16 NOTE — PROGRESS NOTES
01/16/23 1320   Appointment Info   Signing Clinician's Name / Credentials (OT) Aga Reddy,OTR/L,[email or web address]   OT Discharge Planning   OT Plan d/c OT   OT Discharge Recommendation (DC Rec)   (KENDALL w/ assist for toielting, adls. mobility)   OT Brief overview of current status OT: discussion w/ pt re: discharging pt from OT due to not making functional gains. see d/c note for details   Post Acute Settings Only   What unit is patient on? TCU

## 2023-01-16 NOTE — PLAN OF CARE
Physical Therapy Discharge Summary    Reason for therapy discharge:    No further expectations of functional progress.    Progress towards therapy goal(s). See goals on Care Plan in [facility] electronic health record for goal details.  Goals not met.  Barriers to achieving goals:   Due to patient's non weight bearing status he has plateaued with functional progress.    Therapy recommendation(s):    Continued therapy is recommended.  Rationale/Recommendations:  Patient would benefit from physical therapy to address functional mobility once his weight bearing status progresses.

## 2023-01-16 NOTE — PLAN OF CARE
VS: /75 (BP Location: Right arm)   Pulse 69   Temp (!) 96.3  F (35.7  C) (Oral)   Resp 16   Wt 117.5 kg (259 lb)   SpO2 96%   BMI 37.16 kg/m      O2: RA   Output: 500 ML   Last BM: 1/16/23   Activity: Assist of 2 with liko lift.    Skin: CDI   Pain: No c/o of pain during this shift.   CMS: A/O X 4. Able to make needs known. Pt NWB on LLE.   Dressing: None    Diet: Regular diet with poor appetite    LDA: N/A   Equipment: Liko lift, bariatric bed.   Plan: Will continue with POC.   Additional Info: Pt  Is alert and oriented x4. Pt is able to communicate needs and wants. on BG monitor with metformin. Did not get out of bed this shift. Pt refused not get TB test done, so unable to read. Pt refused lunch, but had a can of ensure. Pt denies CP, and SOB. Will continue with plan of care.              Patient's most recent vital signs are:     Vital signs:  BP: 128/75  Temp: 96.3  HR: 69  RR: 16  SpO2: 96 %     Patient does not have new respiratory symptoms.  Patient does not have new sore throat.  Patient does not have a fever greater than 99.5.       Goal Outcome Evaluation:

## 2023-01-17 NOTE — PLAN OF CARE
Goal Outcome Evaluation:    Pt A&OX4, calm, pleasant, & cooperative with care. Denied CP, SOB, & n/v. Pt is A of 2 with liko lift for transfer; was not OOB this shift. Continent/incontinent for BM & continent for bladder. Urinal at the bedside. Takes med whole with thin liquid. Pt is able to make needs known & call light within reach. Will continue with plan of care.    Patient's most recent vital signs are:     Vital signs:  BP: 124/69  Temp: 95.9  HR: 85  RR: 18  SpO2: 96 %     Patient does not have new respiratory symptoms.  Patient does not have new sore throat.  Patient does not have a fever greater than 99.5.

## 2023-01-17 NOTE — PLAN OF CARE
VS: /71   Pulse 75   Temp (!) 95.7  F (35.4  C) (Oral)   Resp 16   Wt 117.5 kg (259 lb)   SpO2 96%   BMI 37.16 kg/m       O2: Room air stats stable. Denies SOB or chest pain.   Output: Voids without difficulties using urinal at bedside. Incontinent of BM x1. LBM 1/17   Activity: Up with assist of 2 using a lift. Pt up in the chair.    Skin: Left hip incision BRII and blanchable redness coccyx, barrier cream applied   Pain: Denies   CMS: Alert and oriented x4. Denies numbness/tingling   Dressing: none   Diet: Tolerating regular diet well. Denies nausea/vomiting   LDA: none   Plan: Continue to monitor per POC.   Additional Info:

## 2023-01-17 NOTE — PLAN OF CARE
Goal Outcome Evaluation:    Pt is alert and oriented x4, able to make his needs known and uses the call light appropriately. Pt is calm and pleasant with care. No indication of CP, SOB, & n/v. Pt is A2 with liko lift for transfer.  Safety rounds completed.     Patient's most recent vital signs are:     Vital signs:  BP: 124/69  Temp: 95.9  HR: 85  RR: 18  SpO2: 96 %     Patient does not have new respiratory symptoms.  Patient does not have new sore throat.  Patient does not have a fever greater than 99.5.

## 2023-01-17 NOTE — PROGRESS NOTES
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 Hospital stay was c/b acute encephalopathy (attributed to anesthesia and sedating meds), acute hypoxic respiratory failure (attributed to obesity hypoventilation syndrome), acute blood loss anemia, HALEY (thought pre renal), and profound physical deconditioning. Transferred to TCU for ongoing rehabilitation.      # Chronic left hip PJI s/p explantation, debridement, and reconstruction with antibiotic spacer (11/22/22)    Dr. Meyers. Surgical cultures grew Finegoldia magna and Proteus mirabilis (also had C. acnes in the past but doing well despite no directed treatment of this). S/p 6 wk course of PO Cipro and Flagyl per ID ended 1/3 - noted potential higher risk for treatment failure given comorbidities, sinus tract, and history of PJI. WBC and CRP have normalized. Pain is controlled but extremely limited in mobility. Saw Dr. Meyers 1/9 and will pursue second stage reimplantation surgery pending 8 wk antibiotic holiday and no recurrence of infection.     - NWB LLE. PT/OT. Patient is being encouraged to focus on rehab and weight bearing in RLE while NWB LLE.   - Continue PRN Tylenol, rare oxycodone use.   - After 8 wk antibiotic holiday (~3/1) will need to arrange for left hip aspiration and testing including cell count, differential, alpha defense and synovasure, aerobic/anaerobic/fungal cultures. Provided all of this infectious workup is negative, would then proceed with second stage reimplantation surgery.      # DM2,  well controlled  HgbA1C 6.8% on 11/23.   - Continue home metformin and Actos.   - Continue  Victoza here as Trulicity is non formulary.   - Home glipizide remains on hold but could reintroduce when needed for BG control as long as PO intake is consistent.      # JAIR  - Continue home CPAP     # Hx DVT/PE  -Continue apixaban 5 mg BID     # Venous insufficiency  Chronic lower extremity edema managed with Lasix.   - Continue Lasix 20 mg daily.     # Non Severe Protein Calorie Malnutrition  -Dietitian following. Patient was counseled by Dr. Meyers to focus on weight loss in effort to reduce morbidity of upcoming second stage reimplantation revision hip surgery.        Diet and/or tube feedings: Regular   DVT/GI prophylaxis: DOAC  Indications for psychotropic medications: he is not on any psychotropic medications  Code status: Full Code         Discharge Planning:   Medically ready for discharge as soon as a skilled facility is found        Interval History:   Mr. Bustos was resting in bed, no acute distress, denies pain. Understands that he is not progressing with therapy enough to continue here and will need ongoing assisted living on discharge. He denies any untreated pain, fevers, chills or additional new concerns.           Physical Exam:   Vitals were reviewed  Blood pressure 129/71, pulse 75, temperature (!) 95.7  F (35.4  C), temperature source Oral, resp. rate 16, weight 117.5 kg (259 lb), SpO2 96 %.  General: 72 year old gentleman resting in bed, no acute distress, denies pain  HEENT: NCAT, anicteric sclera, EOMI, mucous membranes pink and moist  Cardiovascular: regular rate and rhythm, no appreciable murmurs, rubs or gallops  Lungs: breathing comfortably on room air, no adventitious sounds to bilateral auscultation      Sasha Posada PA-C, MPH  Internal Medicine [facility] Hospitalist  [phone]

## 2023-01-17 NOTE — PROGRESS NOTES
SW met with pt at bedside to complete the Request for Payment of Long-Term Care Services and obtain signature. SW explained document to pt and pt reported receiving a phone call from pt's insurance ([facility]) noting that something that was sent to them from TCU was not filled out correctly. Pt reported that insurance company had phone numbers to call to correct mistake and was not sure what the form was. SW explained that the Request of Payment of LTC form was not for [employer] but for MA and explained why it needed to be filled out (paying for pt services while in TCU since pt Medicare no longer paying for them). SW briefly went over what questions the document asked with pt as well. Pt reported that nephkimbrely Abad was assisting pt with applying for MA and that pt would like Pollo to look over document before pt would sign anything. SW noted that SW could leave form in pt's room if nephew would be stopping over. Pt refused this and requested that SW follow up with nephew noting that he would not sign anything right now.     Justina Lutz, [facility]   [facility]

## 2023-01-18 NOTE — PLAN OF CARE
Goal Outcome Evaluation:  No acute issues overnight. Has remained in recliner throughout shift. Has no c/o's pain, discomfort, CP/SOB. Continent using urinal indep.- staff empties, LBM yesterday - incontinent. Liko lift transfers.         Patient's most recent vital signs are:     Vital signs:  BP: 121/66  Temp: 95.4  HR: 81  RR: 18  SpO2: 96 %     Patient does not have new respiratory symptoms.  Patient does not have new sore throat.  Patient does not have a fever greater than 99.5.

## 2023-01-18 NOTE — PLAN OF CARE
Goal Outcome Evaluation:  Pt is alert and oriented x 4. On his recliner at the start of the shift. Ate his breakfast. On Blood sugar check. Continent of Bladder/incon of bowel. Lift with transfer.Continue with current plan of care.                       Patient's most recent vital signs are:     Vital signs:  BP: 119/65  Temp: 96.2  HR: 82  RR: 17  SpO2: 93 %     Patient does not have new respiratory symptoms.  Patient does not have new sore throat.  Patient does not have a fever greater than 99.5.

## 2023-01-18 NOTE — PLAN OF CARE
Goal Outcome Evaluation:    Pt A&OX4, calm, pleasant, & cooperative with care. Denied CP, SOB, & n/v. Pt is A of 2 with liko lift for transfer; stayed on the recliner the whole shift. Pt reported that, feels better on the recliner. Continent/incontinent for BM & continent for bladder. Urinal at the bedside. Takes med whole with thin liquid. Pt is able to make needs known & call light within reach. Will continue with plan of care.    Patient's most recent vital signs are:     Vital signs:  BP: 121/66  Temp: 95.4  HR: 81  RR: 18  SpO2: 96 %     Patient does not have new respiratory symptoms.  Patient does not have new sore throat.  Patient does not have a fever greater than 99.5.

## 2023-01-19 NOTE — PLAN OF CARE
Goal Outcome Evaluation:    Pt A&OX4, calm, pleasant, & cooperative with care. Denied CP, SOB, & n/v. Pt is A of 2 with liko lift for transfer; stayed on the recliner for most of the shift. Continent/incontinent for BM & continent for bladder. Pt had large loose stool this shift. Urinal at the bedside. Takes med whole with thin liquid. Pt is able to make needs known & call light within reach. Will continue with plan of care.    Patient's most recent vital signs are:     Vital signs:  BP: 115/65  Temp: 96.6  HR: 80  RR: 18  SpO2: 91 %     Patient does not have new respiratory symptoms.  Patient does not have new sore throat.  Patient does not have a fever greater than 99.5.

## 2023-01-19 NOTE — PROGRESS NOTES
12/26/22 0921   Appointment Info   Signing Clinician's Name / Credentials (OT) Bear Flores OTRL   Rehab Comments (OT) OT: Eval   Living Environment   People in Home significant other   Current Living Arrangements house   Home Accessibility stairs to enter home;stairs within home   Number of Stairs, Main Entrance 3   Number of Stairs, Within Home, Primary seven   Transportation Anticipated family or friend will provide   Living Environment Comments no bathroom or bedroom on main. pt has shower chair, commode, hip kit and walker at home. 3rd hip surgery.   Self-Care   Usual Activity Tolerance fair   Current Activity Tolerance poor   Equipment Currently Used at Home cane, straight   Instrumental Activities of Daily Living (IADL)   Previous Responsibilities meal prep;housekeeping;laundry;shopping   General Information   Referring Physician Mani Mcqueen MD   Additional Occupational Profile Info/Pertinent History of Current Problem per chart, 73 y/o man who has multiple medical problems including diabetes mellitus type II, rheumatoid arthritis, obstructive sleep apnea and past DVT/PE. Patient had undergone left total hip arthroplasty for left hip osteoarthritis on 07-Mar-2018. 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 Patient apparently was treated with ceftriaxone and amoxicillin following this. Patient had a left hip aspiration in Feb-2021. 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 Patient had fluid aspiration and drain placement on 06-Apr-2022. Cultures grew Cutibacterium species   Existing Precautions/Restrictions   (NWB and total hip LLE, spacer in place)   Left Upper Extremity (Weight-bearing Status) full weight-bearing (FWB)   Right Upper Extremity (Weight-bearing Status) full  weight-bearing (FWB)   Left Lower Extremity (Weight-bearing Status) non weight-bearing (NWB)   Right Lower Extremity (Weight-bearing Status) weight-bearing as tolerated (WBAT)   Cognitive Status Examination   Orientation Status orientation to person, place and time   Affect/Mental Status (Cognitive) WFL   Visual Perception   Visual Impairment/Limitations WNL   Strength Comprehensive (MMT)   General Manual Muscle Testing (MMT) Assessment no strength deficits identified  (UB)   Coordination   Upper Extremity Coordination No deficits were identified   Activities of Daily Living   BADL Assessment/Intervention bathing;upper body dressing;lower body dressing;grooming;feeding;toileting   Clinical Impression   Criteria for Skilled Therapeutic Interventions Met (OT) Yes, treatment indicated   OT Diagnosis declined level of I and safety in I/ADLs   Influenced by the following impairments post surgical restrictions, (NWB LLE), pain, imbalance   OT Problem List-Impairments impacting ADL cognition;balance;problems related to;activity tolerance impaired;flexibility;mobility;motor control;range of motion (ROM);strength   Assessment of Occupational Performance 5 or more Performance Deficits   Identified Performance Deficits declined level of I in ADLs   Planned Therapy Interventions (OT) ADL retraining;IADL retraining;balance training;groups;manual therapy;motor coordination training;ROM;strengthening;transfer training;home program guidelines;progressive activity/exercise;risk factor education   Clinical Decision Making Complexity (OT) moderate complexity   Anticipated Equipment Needs Upon Discharge (OT) hospital bed;wheelchair cushion;wheelchair   Risk & Benefits of therapy have been explained care plan/treatment goals reviewed;evaluation/treatment results reviewed;risks/benefits reviewed;current/potential barriers reviewed;participants voiced agreement with care plan;participants included;patient   Clinical Impression Comments  OT: pt presents with significantly declined level of I and safety in I/ADLs due to NWB status of LLE and pain. pt very sleepy and cautious to attempt to get OOB with OT during evaluation. pt wants to wait for PT. Pt eddie  benefit from skilled OT service to reach max potential level of I in I/ADLs while adhering to WB status.   OT Total Evaluation Time   OT Eval, Moderate Complexity Minutes (70982) 15   Therapy Certification   Start of Care Date 12/26/22   Certification date from 12/26/22   Certification date to 01/24/23   OT Goals   Therapy Frequency (OT) 5 times/wk   OT Predicted Duration/Target Date for Goal Attainment 02/07/23   OT Goals Upper Body Dressing;Hygiene/Grooming;Lower Body Dressing;Upper Body Bathing;Lower Body Bathing;Bed Mobility;Toilet Transfer/Toileting;Meal Preparation;Home Management;Transfers   OT: Hygiene/Grooming modified independent   OT: Upper Body Dressing Modified independent;from wheelchair   OT: Lower Body Dressing Moderate assist;within precautions;Minimal assist;using adaptive equipment   OT: Upper Body Bathing Supervision/stand-by assist   OT: Lower Body Bathing Moderate assist;with precautions;using adaptive equipment   OT: Bed Mobility Modified independent;within precautions;supine to/from sitting   OT: Transfer Modified independent;within precautions;with assistive device   OT: Toilet Transfer/Toileting Modified independent;using adaptive equipment;within precautions;cleaning and garment management   OT: Meal Preparation with simple meal preparation;from wheelchair   OT: Home Management Minimal assist;from wheelchair;with light demand household tasks   Self-Care/Home Management   Self-Care/Home Mgmt/ADL, Compensatory, Meal Prep Minutes (00398) 30   Treatment Detail/Skilled Intervention OT: Encouraged FB sponge bathing and dressing in bed. pt declines. pt performng g/h in bed. explained benefits and purpose. discussed goals and plan for OT POC regarding ADLs. pt hesitant at first  "but verbalized understanding. pt states too fatigued for further participation this date.   OT Discharge Planning   OT Plan OT: sponge bathing in bed, UB strengthening, discuss plan for progressing with toileting to use commode.   OT Discharge Recommendation (DC Rec) home with home care occupational therapy;Per plan established by the OT;Collaborated with OT for updated discharge location   OT Brief overview of current status total A using lift for transfers   Total Session Time   Timed Code Treatment Minutes 30   Total Session Time (sum of timed and untimed services) 45   Post Acute Settings Only   What unit is patient on? TCU   Hygiene/Grooming   Patient Performance Partial/moderate assist \"includes weight bearing assist of trunk or limbs\"   Oral Hygiene   Patient Performance Independent   Staff Performance Set up help only     "

## 2023-01-19 NOTE — PLAN OF CARE
Goal Outcome Evaluation:    VS: BP (!) 143/64   Pulse 66   Temp (!) 96.3  F (35.7  C) (Oral)   Resp 16   Wt 117.5 kg (259 lb)   SpO2 95%   BMI 37.16 kg/m     O2: RA   Output: Voids in urinal; staff empties   Last BM: 1/18 bedpan or incontinent   Activity: Not OOB this shift; refused going to recliner  PT/OT not seeing pt   Skin: X; moisture; L hip incision   Pain: Denies    CMS Neuro: Intact   A&P   Dressing: L hip BRII   Diet: Reg, ate lunch   LDA: N/a    Equipment: ngmocoo, recliner, [facility] bed   Plan: Con't POC.    Additional Info: Pt wants to try sitting EOB and standing up and sitting back on EOB to build strength. Writer not comfortable with this without PT assistance. Will send note to possibly resume PT?         Patient's most recent vital signs are:     Vital signs:  BP: 143/64  Temp: 96.3  HR: 66  RR: 16  SpO2: 95 %     Patient does not have new respiratory symptoms.  Patient does not have new sore throat.  Patient does not have a fever greater than 99.5

## 2023-01-19 NOTE — PLAN OF CARE
Goal Outcome Evaluation  Alert and oriented X 4. Able to make needs known. Call light in reach. Offers no C/O pain/discomfort so far this shift.  Using the urinal at bedside. Last BM on 1/18. Appears to be sleeping during rounds. No new issues or concerns overnight. Continue with plan of care.

## 2023-01-19 NOTE — PROGRESS NOTES
ZEB left a vm for nephew/Luke to ask if he signed 3543 form and sent them back to SW yet.  ZEB did not see them in email.    Pollo will send them today.  ZEB sent paperwork today to EW.    ZEB met with pt and got insurance card and emailed that to Pollo per request.    ZEB did not get 3543 back.         NEREYDA Sharp   [facility], Transitional Care Unit   Social Work   [facility] [address] [place] [address]  ) [phone]

## 2023-01-20 NOTE — PLAN OF CARE
Goal Outcome Evaluation:       Pt had an uneventful night. Alert and oriented x 4. Able to make needs known to staffs. Denied discomfort this shift. Using urinal at bedside. No BM this shift. Appears sleeping during safety rounds. No acute issue noted this shift. Will continue with POC.      Patient's most recent vital signs are:     Vital signs:  BP: 130/78  Temp: 98.9  HR: 76  RR: 18  SpO2: 97 %     Patient does not have new respiratory symptoms.  Patient does not have new sore throat.  Patient does not have a fever greater than 99.5.

## 2023-01-20 NOTE — PROGRESS NOTES
SW did received 3543 back from nephew.    SW met with pt.  SW listened to pt vent about rehab.  SW talked about KENDALL. SW offered supported about pt feelings about surgery and where pt is now.      NEREYDA Sharp   [facility], Transitional Care Unit   Social Work   [facility] [address] [place] [address]  (PH) [phone]       Airway patent, TM normal bilaterally, normal appearing mouth, nose, throat, neck supple with full range of motion, no cervical adenopathy.

## 2023-01-20 NOTE — PROGRESS NOTES
CLINICAL NUTRITION SERVICES - REASSESSMENT NOTE     Nutrition Prescription     RECOMMENDATIONS FOR MDs/PROVIDERS TO ORDER:  Continue to encourage oral intakes and provide snacks/supplements PRN    Malnutrition Status:    Severe malnutrition in the context of acute on chronic illness.     Recommendations already ordered by Registered Dietitian (RD):  Encouraged oral intakes  Discontinued supplements at this time.     Future/Additional Recommendations:  Monitor labs, intakes, and weight trends.      EVALUATION OF THE PROGRESS TOWARD GOALS   Diet: Regular  Snacks/supplements: Nutrisource fiber once per day at dinner mixed into large glass of ice water. Ensure Max once a day at dinner.  Intake/Tolerance: % of documented meals.     Pt ordering 1 meal per day per [facility]. Patient per RN notes and pt has been getting food from outside frequently      NEW FINDINGS/REVIEW OF SYSTEMS  Nutrition/GI: Patient eating turkey with gravy during visit which he states is something new to him. States he is picky so his girlfriend has been helping him order foods from restaurants but he has trouble with online ordering when he is not able to specify what ingredients to leave off. Has ordered from [employer], RN notes state he had [employer] but pt states that he ordered too late and was not able to order from [employer]. Pt states he drinks Ensure but not at the rate it is being sent. Encouraged oral intake and the support of his girlfriend who is making sure he eats. Pt with resistance band and exercises in room. Encouraged patient to use this since he is not being seen by therapy to help to maintain muscle mass.      Weights: Pt seems weight stable prior to admission, down 60 lb (19%) in ~1.5 months.   Wt Readings from Last Encounters:   01/15/23 117.5 kg (259 lb)   01/12/23 119 kg (262 lb 6.4 oz)   01/09/22 119.7 kg (264 lb)   01/06/22 119.9 kg (264 lb 5.3 oz)   12/30/22 118.5 kg (261 lb 3.2 oz)   11/22/22 147.1 kg (324 lb 4.8 oz)    11/14/22 142.9 kg (315 lb)   11/09/22 146.8 kg (323 lb 9.6 oz)   05/19/22 136.1 kg (300 lb)   07/03/18 148.9 kg (328 lb 4.8 oz)     Labs reviewed    Latest Reference Range & Units 01/19/23 08:35 01/19/23 18:06 01/19/23 21:35 01/20/23 06:12 01/20/23 08:03 01/20/23 15:59   GLUCOSE  70 - 99 mg/dL 141 (H) 112 (H) 106 (H) 96 107 (H) 136 (H)      Medications reviewed: lasix, culturell, metformin, pioglitazone  IV Fluids?: No    MALNUTRITION  % Intake: </= 50% for >/= 5 days (severe)  % Weight Loss: > 7.5% in 3 months (severe)   Subcutaneous Fat Loss: Upper arm:  Moderate-severe  Muscle Loss: Temporal:  Mild, Upper arm (bicep, tricep):  Severe and Upper leg (quadricep, hamstring):  Moderate-severe  Fluid Accumulation/Edema: Trace-mild   Malnutrition Diagnosis: Severe malnutrition in the context of acute on chronic illness    Previous Goals   Patient to consume % of nutritionally adequate meal trays TID, or the equivalent with supplements/snacks.  Evaluation: Unable to evaluate    Previous Nutrition Diagnosis  Inadequate oral intake related to multifactorial (altered GI function, decreased appetite, dislike of food) as evidenced by pt reports    Evaluation: No change    CURRENT NUTRITION DIAGNOSIS  Inadequate oral intake related to multifactorial (altered GI function, decreased appetite, dislike of food) as evidenced by pt reports     INTERVENTIONS  Implementation  Nutrition education for nutrition relationship to health/disease - encouraged adequate intakes    Goals  Patient to consume % of nutritionally adequate meal trays TID, or the equivalent with supplements/snacks.    Monitoring/Evaluation  Progress toward goals will be monitored and evaluated per protocol.    Dee Alanis MS, RDN, LDN  RD pager: [phone]  WB Weekend/Holiday Pager: [phone]

## 2023-01-20 NOTE — PLAN OF CARE
Goal Outcome Evaluation:       Pt alert and oriented x4. Makes needs know to staff. Denies CP, SOB, N/V or pain this shift.  and 136. Uses Liko lift to transfer, uses bedpan and urinal. Mixed incontinence. LBM 1/2/.  Regular diet, thin liquids. Takes medications whole. Wishes to speak to  regarding insurance coverage with PT and OT-  informed. L hip incision open to air. NWB LLE. Up in chair to eat meals. Call light within reach, continue POC.         Patient's most recent vital signs are:     Vital signs:  BP: 126/72  Temp: 97.3  HR: 84  RR: 18  SpO2: 96 %     Patient does not have new respiratory symptoms.  Patient does not have new sore throat.  Patient does not have a fever greater than 99.5.

## 2023-01-20 NOTE — PLAN OF CARE
Goal Outcome Evaluation:    Patient is alert and oriented x 4. He is able to make his needs known. Pt denied SOB and chest pain. He is able to swallow without difficulty but prefers to take meds with apple sauce. Pt refused Micatin powder.      Patient's most recent vital signs are:     Vital signs:  BP: 130/78  Temp: 98.9  HR: 76  RR: 18  SpO2: 97 %     Patient does not have new respiratory symptoms.  Patient does not have new sore throat.  Patient does not have a fever greater than 99.5.

## 2023-01-20 NOTE — PLAN OF CARE
Goal Outcome Evaluation:      Plan of Care Reviewed With: patient     Overall Patient Progress: improving    Outcome Evaluation: Patient with improving intakes and appetite. Has been ordering outside food with the help of his girlfriend. Continue to encourage intakes and provide snacks/supplements PRN

## 2023-01-21 NOTE — PLAN OF CARE
Goal Outcome Evaluation:    VS: /63 (BP Location: Right arm, Patient Position: Sitting, Cuff Size: Adult Regular)   Pulse 84   Temp (!) 96  F (35.6  C) (Oral)   Resp 18   Wt 117.5 kg (259 lb)   SpO2 96%   BMI 37.16 kg/m     O2: RA   Output: Urinal; staff empties; good output   Last BM: 1/20   Activity: In recliner  Therapy approved for 3 days  LLE NWB; [place] lift A-2   Skin: X; left hip incision  Challenging to reposition in recliner   Pain: When moves    CMS Neuro: Intact   A&O x3, makes needs known   Dressing: Left hip BRII   Diet: Reg; prefers food from outside hospital  Supplement drinks ok   LDA:    Equipment: estephania Collins reclinerestephania bed   Plan: Con't POC.   Additional Info:       Patient's most recent vital signs are:     Vital signs:  BP: 111/63  Temp: 96  HR: 84  RR: 18  SpO2: 96 %     Patient does not have new respiratory symptoms.  Patient does not have new sore throat.  Patient does not have a fever greater than 99.5.

## 2023-01-21 NOTE — PROGRESS NOTES
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 Hospital stay was c/b acute encephalopathy (attributed to anesthesia and sedating meds), acute hypoxic respiratory failure (attributed to obesity hypoventilation syndrome), acute blood loss anemia, HALEY (thought pre renal), and profound physical deconditioning. Transferred to TCU for ongoing rehabilitation. While at TCU, he has experienced a plateau in his physical progress due to both pain and lack of motivation and has now stopped PT/OT and is awaiting placement at a skilled facility long term.     # Chronic left hip PJI s/p explantation, debridement, and reconstruction with antibiotic spacer (11/22/22)    Dr. Meyers. Surgical cultures grew Finegoldia magna and Proteus mirabilis (also had C. acnes in the past but doing well despite no directed treatment of this). S/p 6 wk course of PO Cipro and Flagyl per ID ended 1/3 - noted potential higher risk for treatment failure given comorbidities, sinus tract, and history of PJI. WBC and CRP have normalized. Pain is controlled but extremely limited in mobility. Saw Dr. Meyers 1/9 and will pursue second stage reimplantation surgery pending 8 wk antibiotic holiday and no recurrence of infection.     - NWB LLE. PT/OT. Patient is being encouraged to focus on rehab and weight bearing in RLE while NWB LLE.   - Continue PRN Tylenol, rare oxycodone use.   - After 8 wk antibiotic holiday (~3/1) will need to arrange for left hip aspiration and testing including cell count, differential,  alpha defense and synovasure, aerobic/anaerobic/fungal cultures. Provided all of this infectious workup is negative, would then proceed with second stage reimplantation surgery.      # DM2, well controlled  HgbA1C 6.8% on 11/23.   - Continue home metformin and Actos.   - Continue  Victoza here as Trulicity is non formulary.   - Home glipizide remains on hold but could reintroduce when needed for BG control as long as PO intake is consistent.      # JAIR  - Continue home CPAP     # Hx DVT/PE  -Continue apixaban 5 mg BID     # Venous insufficiency  Chronic lower extremity edema managed with Lasix.   - Continue Lasix 20 mg daily.     # Non Severe Protein Calorie Malnutrition  -Dietitian following. Patient was counseled by Dr. Meyers to focus on weight loss in effort to reduce morbidity of upcoming second stage reimplantation revision hip surgery.        Diet and/or tube feedings: Regular   DVT/GI prophylaxis: DOAC  Indications for psychotropic medications: he is not on any psychotropic medications  Code status: Full Code         Discharge Planning:   Medically ready for discharge as soon as a skilled facility is found        Interval History:   Mr. Bustos was resting in bed, no acute distress, denies pain. We talked in detail about his goals - which currently to be able to sit at the edge of his bed by himself. He does report being frustated that PT staff signed off, but also understands that he wasn't able to do very much with them until he is able to bear weight on his LLE.         Physical Exam:   Vitals were reviewed  Blood pressure 111/63, pulse 84, temperature (!) 96  F (35.6  C), temperature source Oral, resp. rate 18, weight 117.5 kg (259 lb), SpO2 96 %.  General: 72 year old gentleman resting in bed, no acute distress, denies pain  HEENT: NCAT, anicteric sclera, EOMI, mucous membranes pink and moist  Cardiovascular: regular rate and rhythm, no appreciable murmurs, rubs or gallops  Lungs: breathing comfortably  on room air, no adventitious sounds to bilateral auscultation      Sasha Posada PA-C, MPH  Internal Medicine [facility] Hospitalist  [phone]

## 2023-01-21 NOTE — PLAN OF CARE
Goal Outcome Evaluation:  Pt.A&Ox4. Remains in recliner. Has no c/o's pain, discomfort, CP/SOB or any other complaints during shift. Continent using urinal indep.- staff empties.        Patient's most recent vital signs are:     Vital signs:  BP: 126/72  Temp: 97.3  HR: 84  RR: 18  SpO2: 96 %     Patient does not have new respiratory symptoms.  Patient does not have new sore throat.  Patient does not have a fever greater than 99.5.

## 2023-01-22 NOTE — PLAN OF CARE
Goal Outcome Evaluation:        A&O x4. Makes needs know to staff. Denies CP, SOB, N/V or pain this shift.  and 178. Uses Liko lift to transfer. Mixed incontinence, uses bed pan and urinal. LBM 1/20. Regular diet,  Good appitite, ate 100% of meals. Takes medications whole, PO. Pt updated that insurance approved 3 more days of therapy. L hip incision open to air. NWB for  LLE. Not OOB this shift. Call light within reach, continue POC.         Patient's most recent vital signs are:     Vital signs:  BP: 109/68  Temp: 96.9  HR: 90  RR: 18  SpO2: 98 %     Patient does not have new respiratory symptoms.  Patient does not have new sore throat.  Patient does not have a fever greater than 99.5.

## 2023-01-22 NOTE — PLAN OF CARE
Goal Outcome Evaluation:    VS: /75 (BP Location: Right arm)   Pulse 89   Temp 96.9  F (36.1  C) (Oral)   Resp 16   Wt 117.5 kg (259 lb)   SpO2 96%   BMI 37.16 kg/m      O2: RA   Output: Urinal; staff empties; good output   Last BM: 1/20   Activity: Therapy approved for 3 days  LLE NWB; Liko lift A-2   Skin: X; left hip incision  Challenging to reposition; pt prefers to maintain   Pain: When moves, no meds requested   CMS Neuro: Intact   A&O x3, makes needs known   Dressing: Left hip BRII   Diet: Reg; prefers food from outside hospital  Supplemental drinks ok   LDA:     Equipment: estephania Collins reclinerestephania bed   Plan: Con't POC.   Additional Info: Pt is requesting a leg  - will send Sticky Note for therapy.     Patient's most recent vital signs are:     Vital signs:  BP: 129/75  Temp: 96.9  HR: 89  RR: 16  SpO2: 96 %     Patient does not have new respiratory symptoms.  Patient does not have new sore throat.  Patient does not have a fever greater than 99.5.

## 2023-01-22 NOTE — PLAN OF CARE
Goal Outcome Evaluation:  No acute issues overnight. Now in his bed and appears to be sleeping well throughout shift. Has no c/o's or requests as of yet. Continent using urinal indep.- staff empties.         Patient's most recent vital signs are:     Vital signs:  BP: 109/68  Temp: 96.9  HR: 90  RR: 18  SpO2: 98 %     Patient does not have new respiratory symptoms.  Patient does not have new sore throat.  Patient does not have a fever greater than 99.5.

## 2023-01-23 NOTE — PROGRESS NOTES
ZEB emailed nephew/Pollo to see if he has heard anything from MA?  Also, told him EW  is coming tomorrow.  Have her look into the system too.   Pollo stated he hasn't heard anything yet.  Proofs were sent in Friday.     NEREYDA Sharp   [facility], Transitional Care Unit   Social Work   [facility] [place] [address], [address] [place] [address]  (ph) [phone]

## 2023-01-23 NOTE — PLAN OF CARE
Goal Outcome Evaluation:        A&O x4. Makes needs know to staff. Denies CP, SOB, N/V or pain this shift.  and 105. Uses Liko lift to transfer. Continent of B&B., uses bed pan and urinal. LBM 1/20. Regular diet,  Good appitite, ate 100% of meals. Takes medications whole, PO.  L hip incision open to air. NWB for  LLE. Not OOB this shift. Call light within reach, continue POC.         Patient's most recent vital signs are:     Vital signs:  BP: 124/72  Temp: 96.8  HR: 94  RR: 18  SpO2: 98 %     Patient does not have new respiratory symptoms.  Patient does not have new sore throat.  Patient does not have a fever greater than 99.5.

## 2023-01-23 NOTE — PLAN OF CARE
Goal Outcome Evaluation:         A&O x4. Makes needs know to staff. Denies CP, SOB, N/V or pain this shift. .  Uses Liko lift to transfer. Continent of B&B-uses bed pan and urinal. LBM 1/23. Regular diet,  Good appitite, ate 100% of meals (ordered from [employer]). Takes medications whole, PO. NWB for  LLE. Not OOB this shift. Call light within reach, continue POC.         Patient's most recent vital signs are:     Vital signs:  BP: 146/73  Temp: 96.7  HR: 91  RR: 18  SpO2: 94 %     Patient does not have new respiratory symptoms.  Patient does not have new sore throat.  Patient does not have a fever greater than 99.5.

## 2023-01-23 NOTE — PLAN OF CARE
Goal Outcome Evaluation:  Pt.A&Ox4. Uses call light appropriately. Appears to be sleeping well and has no c/o's or requests as of yet. Continent using urinal indep.- staff empties. Liko lift transfers. NWB LLE.         Patient's most recent vital signs are:     Vital signs:  BP: 124/72  Temp: 96.8  HR: 94  RR: 18  SpO2: 98 %     Patient does not have new respiratory symptoms.  Patient does not have new sore throat.  Patient does not have a fever greater than 99.5.

## 2023-01-23 NOTE — PLAN OF CARE
Goal Outcome Evaluation:    Pt alert and oriented X4. Able to make needs known. Denied SOB, CP, N/V. Pt NWB to LLE. Did not get OOB this shift.         Patient's most recent vital signs are:     Vital signs:  BP: 132/74  Temp: 96.4  HR: 91  RR: 16  SpO2: 92 %     Patient does not have new respiratory symptoms.  Patient does not have new sore throat.  Patient does not have a fever greater than 99.5.

## 2023-01-23 NOTE — PROGRESS NOTES
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 Hospital course was c/b acute encephalopathy likely 2/2 anesthesia and sedating meds (resolved), acute hypoxic respiratory failure 2/2 obesity hypoventilation syndrome (resolved), acute blood loss anemia (resolved), pre-renal HALEY (resolved), and profound physical deconditioning. Transferred to TCU on 12/24/2022 for ongoing rehabilitation. While at TCU, he has experienced a plateau in his physical progress due to both pain and lack of motivation and has now stopped PT/OT and is awaiting placement at a skilled facility long term.     # Chronic left hip PJI s/p explantation, debridement, and reconstruction with antibiotic spacer (11/22/22)   With Dr. Meyers. Surgical cultures grew Finegoldia magna and Proteus mirabilis (also had C. acnes in the past but doing well despite no directed treatment of this). S/p 6 wk course of oral Cipro and Flagyl per ID (ended 1/3) - noted potential higher risk for treatment failure given comorbidities, sinus tract, and history of PJI. WBC and CRP have normalized and stable on trend. Pain is controlled at rest but extremely limited in mobility. Saw Dr. Meyers 1/9 and will pursue second stage reimplantation surgery pending 8 wk antibiotic holiday and no recurrence of infection.   - NWB LLE. PT/OT. Patient is being encouraged to focus on rehab and weight bearing in RLE while NWB LLE.   - Continue PRN Tylenol, discontinue oxycodone as has not used in nearly 2 weeks  - After 8 wk antibiotic holiday (~3/1) will need to arrange  for left hip aspiration and testing including cell count, differential, alpha defense and synovasure, aerobic/anaerobic/fungal cultures. Provided all of this infectious workup is negative, would then proceed with second stage reimplantation surgery. Follow up with orthopedic surgery after L hip aspiration      # DM2, well controlled - HgbA1C 6.8% on 11/23. Blood sugars well controlled.  - Continue POCT glucose BID  - Continue home metformin XR 2000 mg daily and Actos 45 mg daily.   - Continue  Victoza 1.8 mg subcutaneous daily here as Trulicity is non formulary.   - Home glipizide remains on hold but could reintroduce when needed for BG control as long as PO intake is consistent.      # Non Severe Protein Calorie Malnutrition-  Patient was counseled by Dr. Meyers to focus on weight loss in effort to reduce morbidity of upcoming second stage reimplantation revision hip surgery  - Dietitian following.     # Tingling of 2-4th distal fingers of R hand - Patient noted present since surgery 11/2022, stable without progression or changes since. Hand is neurovascularly intact. Possible neuropathy 2/2 positional injury from surgery, neuropathy from prolong flouroquinolone use or neuropathy from DM2. Follow up with PCP.     ---- Stable chronic conditions------   # HLD - Continue PTA lipitor 40 mg daily   # JAIR  - Continue home CPAP  # Hx DVT/PE - Continue apixaban 5 mg BID  # Venous insufficiency - Chronic lower extremity edema managed with Lasix. Continue PTA Lasix 20 mg daily.      The above was discuss with patient and and attending physician, Dr. Obed Andrade who agrees with the above    CODE: Full Code  DVT: Eliquis   Diet: Regular diet   Indications for Psychotropic Medications: Patient is not on any psychotropic medications  Disposition: TBD. SW looking for LTC facility     Genoveva Carranza PA-C  Hospitalist Service  Contact information available via [facility]  Paging/Directory    ________________________________________________________________    Subjective & Interval History:    Patient reports no new changes. Patient is just frustrated with not knowing the plan for his placement. Denies any L hip pain at rest, but develops significant pain with movement. Denies any numbness or tingling of L LE. Denies any F/C, CP, SOB, N/V/D, abdominal pain or urinary symptoms. Last BM was last night. Patient does report chronic tingling of middle to distal aspect of 2nd, 3rd and 4th finger of R hand, which started after surgery in November, and unchanged since. 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 Denies any known injuries.    Last 24 hour care team notes reviewed.   ROS: 4 point ROS (including Respiratory, CV, GI and ) was performed and negative unless otherwise noted in HPI.     Medications: Reviewed in EPIC.    Physical Exam:    General Appearance: Alert and awake. Answering questions appropriately. Non-toxic appearing. Lying in bed, in NAD.   HEENT: AT/NC. Anicteric sclera, MMM.   Respiratory: Breathing comfortably on room air, lungs clear to anterior auscultation without wheezing or crackles   Cardiovascular: RRR, S1, S2. No murmur noted  GI: Abdomen soft, non-tender with active bowel sounds. No guarding or rebound.   Lymph/Hematologic: Trace peripheral edema bilaterally.   Skin: Intact. Warm and dry. Incision of L hip well approximated. No erythema, warmth or discharge.   Musculoskeletal: Moves all extremities. Radial pulse +1 in RUE. Symmetric  strength. Sensation intact to light tough of upper extremities bilaterally.   Neurologic: No focal deficits, CN II-XII grossly intact.       Lines/Tubes/Drains:

## 2023-01-24 NOTE — PLAN OF CARE
Goal Outcome Evaluation:                      Orientation: AOx4  Bowel: Continent, uses bed pan  Bladder: Continent, uses urinal   Pain: Denies pain unless moving in bed/therapy  Ambulation/Transfers: Lift, non-weight bearing on LLE  Diet/ Liquids: Regular diet, thin liquids  Skin: Old hip incision, not assessed this shift

## 2023-01-24 NOTE — PLAN OF CARE
Goal Outcome Evaluation:  No acute issues overnight. Appears to be sleeping well and has no c/o's or requests as of yet. Liko lift transfers / NWB LLE. Continent B&B, uses urinal indep.- staff empties, LBM yesterday.        Patient's most recent vital signs are:     Vital signs:  BP: 146/73  Temp: 96.7  HR: 91  RR: 18  SpO2: 94 %     Patient does not have new respiratory symptoms.  Patient does not have new sore throat.  Patient does not have a fever greater than 99.5.

## 2023-01-24 NOTE — PROGRESS NOTES
ZEB took Fatou/SHU  into pt's room.  We talked awhile.  Fatou said MA is not active yet.     NEREYDA Sharp   [facility], Transitional Care Unit   Social Work   [address] [place] [address], 4th Floor  [place], [place] [address] (ph) [phone]

## 2023-01-24 NOTE — PROGRESS NOTES
MDS Pain Assessment    The following is the pain interview as conducted by the TCU RN caring for the patient on January / 24 / 2023. This assessment is required by the [facility] for all patients in [place] SNF (Skilled Nursing Facilities).       1. Have you had pain or hurting at any time in the last 5 days? Yes    2. How much of the time have you experienced pain or hurting over the last 5 days? occasionally    3. Over the past 5 days, has pain made it hard for you to sleep at night? No    4. Over the past 5 days, have you limited your day-to-day activities because of pain? Yes    5. Pain intensity (Numeric scale used first-if patient unable to answer, verbal scale to be used.)    Numeric Scale: Please rate your worst pain over the last 5 days on a zero to ten scale, with zero being no pain and ten being the worst pain you can imagine.     8    Verbal Scale: USED ONLY if unable to give numeric, otherwise N/A  Please rate the intensity of your worst pain over the last 5 days.     not applicable

## 2023-01-25 NOTE — PLAN OF CARE
Goal Outcome Evaluation:    VS: /75 (BP Location: Right arm)   Pulse 77   Temp 96.9  F (36.1  C) (Axillary)   Resp 16   Wt 117.5 kg (259 lb)   SpO2 96%   BMI 37.16 kg/m     O2: RA   Output: Urinal    Last BM: 1/23, passing flatus   Activity: No therapy, not OOB  Liko A-2 for bed pan   Skin: X; moist to groin area   Pain: When moved    CMS Neuro: Intact  A&O   Dressing:    Diet: Reg, low appetite     LDA: N/a    Equipment: Liko, urinal, recliner, w/c    Plan: Con't POC.    Additional Info:       Patient's most recent vital signs are:     Vital signs:  BP: 124/75  Temp: 96.9  HR: 77  RR: 16  SpO2: 96 %     Patient does not have new respiratory symptoms.  Patient does not have new sore throat.  Patient does not have a fever greater than 99.5

## 2023-01-25 NOTE — PROGRESS NOTES
Brief Medicine Note    Please see last progress note for details on 1/23/23. Reviewed vitals and stable. Labs today notable to mild increase in Cr to 1.33, and BUN 34. Will hold lasix, and repeat BMP in AM. No other nephrotoxic agents on med list. Encourage oral hydration.     Genoveva Carranza [facility] Medicine

## 2023-01-25 NOTE — PLAN OF CARE
Patient is alert and oriented x4. Denies any SOB and CP. Able to make needs known. Continent both bladder and bowel. Uses liko lift for transfer. NWB on his LLE. No complain of any discomfort or pain. Listening to music and appears to be comfortable in bed. No PRN medications given. Slept well overnight. Safety rounds done. No acute issue during shift. Continue with the plan of care.     Patient's most recent vital signs are:     Vital signs:  BP: 121/76  Temp: 96.7  HR: 77  RR: 20  SpO2: 96 %     Patient does not have new respiratory symptoms.  Patient does not have new sore throat.  Patient does not have a fever greater than 99.5.

## 2023-01-25 NOTE — PHARMACY-MEDICATION REGIMEN REVIEW
Pharmacy Medication Regimen Review  Waldo Bustos is a 72 year old male who is currently in the Transitional Care Unit.    Assessment: All medications have an appropriate indications, durations and no unnecessary use was found    Plan:   Continue current medications as ordered  Attending provider will be sent this note for review.  If there are any emergent issues noted above, pharmacist will contact provider directly by phone.      Pharmacy will periodically review the resident's medication regimen for any PRN medications not administered in > 72 hours and discontinue them. The pharmacist will discuss gradual dose reductions of psychopharmacologic medications with interdisciplinary team on a regular basis.    Please contact pharmacy if the above does not answer specific medication questions/concerns.    Background:  A pharmacist has reviewed all medications and pertinent medical history today.  Medications were reviewed for appropriate use and any irregularities found are listed with recommendations.      Current Facility-Administered Medications:      - Skin Test Reading -, , Does not apply, Q21 Days, Lang Blakely PA-C     acetaminophen (TYLENOL) tablet 1,000 mg, 1,000 mg, Oral, Q8H PRN, Jem Smith DO, 1,000 mg at 01/11/23 1410     apixaban ANTICOAGULANT (ELIQUIS) tablet 5 mg, 5 mg, Oral, BID, Lang Blakely PA-C, 5 mg at 01/25/23 1022     atorvastatin (LIPITOR) tablet 40 mg, 40 mg, Oral, Daily, Mani Mcqueen MD, 40 mg at 01/25/23 1021     calcium carbonate (TUMS) chewable tablet 500 mg, 500 mg, Oral, TID PRN, Jem Smith DO, 500 mg at 12/31/22 1132     glucose gel 15-30 g, 15-30 g, Oral, Q15 Min PRN **OR** dextrose 50 % injection 25-50 mL, 25-50 mL, Intravenous, Q15 Min PRN **OR** glucagon injection 1 mg, 1 mg, Subcutaneous, Q15 Min PRN, Lang Blakely PA-C     [Held by provider] furosemide (LASIX) tablet 20 mg, 20 mg, Oral, Daily, Mae Moseley PA-C, 20 mg at 01/25/23  1021     lactobacillus rhamnosus (GG) (CULTURELL) capsule 1 capsule, 1 capsule, Oral, BID, Lang Blakely PA-C, 1 capsule at 01/25/23 1021     liraglutide (VICTOZA) injection 1.8 mg, 1.8 mg, Subcutaneous, Daily, Lang Blakely PA-C, 1.8 mg at 01/25/23 1020     metFORMIN (GLUCOPHAGE XR) 24 hr tablet 2,000 mg, 2,000 mg, Oral, QAM, Lang Blakely PA-C, 2,000 mg at 01/24/23 1002     miconazole (MICATIN) 2 % powder, , Topical, BID, Lang Blakely PA-C, Given at 01/25/23 1025     naloxone (NARCAN) injection 0.2 mg, 0.2 mg, Intravenous, Q2 Min PRN **OR** naloxone (NARCAN) injection 0.4 mg, 0.4 mg, Intravenous, Q2 Min PRN **OR** naloxone (NARCAN) injection 0.2 mg, 0.2 mg, Intramuscular, Q2 Min PRN **OR** naloxone (NARCAN) injection 0.4 mg, 0.4 mg, Intramuscular, Q2 Min PRN, Mani Mcqueen MD     Nurse may request from Pharmacy a change of form of medication (e.g. Liquid to tablet)., , Does not apply, Continuous PRN, Lang Blakely PA-C     ondansetron (ZOFRAN ODT) ODT tab 4 mg, 4 mg, Oral, Q6H PRN, Jem Smith DO, 4 mg at 12/31/22 0829     Patient is already receiving anticoagulation with heparin, enoxaparin (LOVENOX), warfarin (COUMADIN)  or other anticoagulant medication, , Does not apply, Continuous PRN, Lang Blakely PA-C     pioglitazone (ACTOS) tablet 45 mg, 45 mg, Oral, Daily, Lang Blakely PA-C, 45 mg at 01/25/23 1021     polyethylene glycol (MIRALAX) Packet 17 g, 17 g, Oral, Daily PRN, Beverly Johnson PA     senna-docusate (SENOKOT-S/PERICOLACE) 8.6-50 MG per tablet 2 tablet, 2 tablet, Oral, BID PRN, Beverly Johnson PA     tuberculin injection 5 Units, 5 Units, Intradermal, Q21 Days, Lang Blakely PA-C  No current outpatient prescriptions on file.   Ivan Garcia, MariamaD, BCPS

## 2023-01-25 NOTE — PLAN OF CARE
Goal Outcome Evaluation:         Pt alert and oriented, able to make needs known, continent of bowel and bladder, take pills whole with water, VSS, no c/o chest pain, SOB, N/V. Left leg moderately impaired, uses liko lift for transfers. Had a shower this evening. Call light placed within reached. Will continue with POC.                 Patient's most recent vital signs are:     Vital signs:  BP: 121/76  Temp: 96.7  HR: 77  RR: 20  SpO2: 96 %     Patient does not have new respiratory symptoms.  Patient does not have new sore throat.  Patient does not have a fever greater than 99.5.

## 2023-01-26 NOTE — PLAN OF CARE
Pt is A&O x4. Pt able to make needs known.Pt denied pain. Pt denies chest pain, SOB, N/V. L arm PIV patent . Pt received LR bolus infusion. Pt used urinal for voiding, emptied yellow urine x 2. Checked Post void residual 37 mL and 57 mL.Pt takes med's whole with thin liquids.Pt is not OOB during this shift, Repositioned. NWB on his LLE. L hip dressing BRII.Call light with in reach. Will continue with plan of care.     Patient's most recent vital signs are:     Vital signs:  BP: 121/64  Temp: 96.3  HR: 76  RR: 18  SpO2: 98 %     Patient does not have new respiratory symptoms.  Patient does not have new sore throat.  Patient does not have a fever greater than 99.5.

## 2023-01-26 NOTE — PLAN OF CARE
Goal Outcome Evaluation:  Alert and oriented X 4. Able to make needs known. Call light in reach. Offers no C/O pain so far this shift. Using the urinal at bedside.  Last BM on 1/25. Appears to be sleeping during rounds. No new issues/concerns overnight. Continue with plan of care.

## 2023-01-26 NOTE — PLAN OF CARE
Goal Outcome Evaluation:    VS: /64 (BP Location: Right arm, Patient Position: Supine, Cuff Size: Adult Regular)   Pulse 76   Temp (!) 96.3  F (35.7  C) (Oral)   Resp 18   Wt 117.5 kg (259 lb)   SpO2 98%   BMI 37.16 kg/m     O2: RA   Output: Urinal, good output. Pt drinking water and Ensure  PVR ordered:    Last BM: 1/25   Activity: No therapy, not OOB  Liko A-2 for bed pan  LLE NWB   Skin: X; moist to groin area   Pain: When moved    CMS Neuro: Intact  A&O   Dressing: L hip BRII   Diet: Reg, low appetite     LDA: N/a    Equipment: Liko, urinal, recliner, w/c    Plan: Con't POC.    Additional Info: LR bolus hung at 1300 DT creatinine, GFR     Patient's most recent vital signs are:     Vital signs:  BP: 121/64  Temp: 96.3  HR: 76  RR: 18  SpO2: 98 %     Patient does not have new respiratory symptoms.  Patient does not have new sore throat.  Patient does not have a fever greater than 99.5.

## 2023-01-26 NOTE — PROGRESS NOTES
Brief Medicine Note     Please see last full progress note from 1/23/23. Reviewed VS and stable. Repeat labs with Cr. 1.30 with BUN 37, despite holding lasix. Per nursing, patient with minimal PO intake. Will continue to hold lasix. Ordered LR bolus 1000 mL x1. PVR bladder scan ordered. Will repeat BMP in AM.     [facility]

## 2023-01-26 NOTE — PLAN OF CARE
Pt is alert and oriented x4. Pt able to make needs known. Pt denied pain. Pt denies chest pain, SOB, N/V. Pt used urinal for voiding, emptied yellow urine x 2. Pt takes med's whole with thin liquids. Pt ordered dinner from out side, with good appetite.Pt is not OOB during this shift, Repositioned. No BM in this shift. NWB on his LLE. Call light with in reach. Will continue with plan of care.    Patient's most recent vital signs are:     Vital signs:  BP: 119/73  Temp: 97.8  HR: 81  RR: 18  SpO2: 97 %     Patient does not have new respiratory symptoms.  Patient does not have new sore throat.  Patient does not have a fever greater than 99.5.

## 2023-01-27 NOTE — PROGRESS NOTES
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 Hospital course was c/b acute encephalopathy likely 2/2 anesthesia and sedating meds (resolved), acute hypoxic respiratory failure 2/2 obesity hypoventilation syndrome (resolved), acute blood loss anemia (resolved), pre-renal HALEY (resolved), and profound physical deconditioning. Transferred to TCU on 12/24/2022 for ongoing rehabilitation. While at TCU, he has experienced a plateau in his physical progress due to both pain and lack of motivation and has now stopped PT/OT and is awaiting placement at a skilled facility long term.     # Chronic left hip PJI s/p explantation, debridement, and reconstruction with antibiotic spacer (11/22/22)   With Dr. Meyers. Surgical cultures grew Finegoldia magna and Proteus mirabilis (also had C. acnes in the past but doing well despite no directed treatment of this). S/p 6 wk course of oral Cipro and Flagyl per ID (ended 1/3) - noted potential higher risk for treatment failure given comorbidities, sinus tract, and history of PJI. WBC and CRP have normalized and stable on trend. Pain is controlled at rest but extremely limited in mobility. Saw Dr. Meyers 1/9 and will pursue second stage reimplantation surgery pending 8 wk antibiotic holiday and no recurrence of infection.   - NWB LLE.   - Continue Tylenol 1000 mg Q8H PRN   - After 8 wk antibiotic holiday (~3/1) will need to arrange for left hip aspiration and testing including cell count, differential, alpha defense and synovasure, aerobic/anaerobic/fungal cultures.  Provided all of this infectious workup is negative, would then proceed with second stage reimplantation surgery. Follow up with orthopedic surgery after L hip aspiration (not yet scheduled)     # HALEY, improving -  Cr baseline 0.9-1.1. Creatinine bumped to 1.33 on 1/25 in setting of poor PO intake. Held diuretics and given IV hydration with improvement to 1.10 today. PVR minimal, so no signs of retention. Review medication list, and no nephrotoxic agents on board.   - Encourage PO hydration   - hold lasix as noted below  - Trend BMP M,W,F  - Avoid nephrotoxic agents, renally dose medications      # DM2, well controlled - HgbA1C 6.8% on 11/23. Blood sugars well controlled.  - Continue POCT glucose BID  - Continue home metformin XR 2000 mg daily and Actos 45 mg daily.   - Continue  Victoza 1.8 mg subcutaneous daily here as Trulicity is non formulary.   - Home glipizide remains on hold with poor PO intake but could reintroduce when needed for BG control as long as PO intake is consistent.      # Non Severe Protein Calorie Malnutrition-  Patient was counseled by Dr. Meyers to focus on weight loss in effort to reduce morbidity of upcoming second stage reimplantation revision hip surgery  - Nutrition following     # Tingling of 2-4th distal fingers of R hand - Patient noted present since surgery 11/2022, stable without progression or changes since. Hand is neurovascularly intact. Possible neuropathy 2/2 positional injury from surgery, neuropathy from prolong flouroquinolone use or neuropathy from DM2.   - Follow up with PCP     # Intertrigo of L groin - Continue miconazole powder and keeping area dry     ---- Stable chronic conditions------   # HLD - Continue PTA lipitor 40 mg daily   # JAIR  - Continue home CPAP  # Hx DVT/PE - Continue PTA apixaban 5 mg BID  # Venous insufficiency - Chronic lower extremity edema managed with Lasix. Holding PTA lasix with mild bump in creatinine.       The above was discuss with patient and  and attending physician, Dr. Anthony Angelo who agrees with the above    CODE: Full Code  DVT: Eliquis   Diet: Regular diet   Indications for Psychotropic Medications: Patient is not on any psychotropic medications  Disposition: TBD. SW looking for LTC facility     Genoveva Carranza PA-C  Hospitalist Service  Contact information available via [facility] Paging/Directory    ________________________________________________________________    Subjective & Interval History:    Patient reports feeling well, reporting no new changes. He expresses frustration in awaiting placement and noting increased stiffness in L leg. Denies any pain in L leg at rest, unchanged pain with movement. Denies any F/C, CP, SOB, N/V/D, abdominal pain, dysuria, hematuria, flank pain or difficulty emptying bladder. Reports poor PO intake, stating he does not like the food here in the hospital, but was able to order some chicken nuggets last night. States he has been drinking plenty of fluids. Reports some rash over the L groin, which nursing have been putting cream/powder on.     Last 24 hour care team notes reviewed.   ROS: 4 point ROS (including Respiratory, CV, GI and ) was performed and negative unless otherwise noted in HPI.     Medications: Reviewed in EPIC.    Physical Exam:    General Appearance: Alert and awake. Answering questions appropriately. Non-toxic appearing. Lying in bed, in NAD.   HEENT: AT/NC. Anicteric sclera, MMM.   Respiratory: Breathing comfortably on room air, lungs clear to anterior auscultation without wheezing or crackles   Cardiovascular: RRR, S1, S2. No murmur noted  GI: Abdomen soft, non-tender with active bowel sounds. No guarding or rebound.   Lymph/Hematologic: Trace peripheral edema bilaterally.   Skin: Intact. Warm and dry.   Musculoskeletal: Moves all extremities.  Neurologic: No focal deficits, CN II-XII grossly intact.     Lines/Tubes/Drains:

## 2023-01-27 NOTE — PLAN OF CARE
Pt is alert and oriented x 4. Able to make needs known. Pt denied cheat pain, SOB, N/V. PIV on Left forearm intact. Denied pain.Pt voided x 1 PRV 31 mL. Appears to be sleeping during rounds.No acute changes during this shift. Call light with in reach. Continue with plan of care.    Patient's most recent vital signs are:     Vital signs:  BP: 127/66  Temp: 96.9  HR: 79  RR: 20  SpO2: 98 %     Patient does not have new respiratory symptoms.  Patient does not have new sore throat.  Patient does not have a fever greater than 99.5.

## 2023-01-27 NOTE — PLAN OF CARE
Problem: Pain Acute  Goal: Optimal Pain Control and Function  Outcome: Progressing     Problem: Pain Acute  Goal: Optimal Pain Control and Function  Outcome: Progressing     Problem: Skin Injury Risk Increased  Goal: Skin Health and Integrity  Description: Perform a full pressure injury risk assessment, as indicated by screening, upon admission to care unit.    Reassess skin (full inspection and injury risk, including skin temperature, consistency and color) frequently (e.g., scheduled interval, with change in condition) to provide optimal early detection and prevention.    Maintain adequate tissue perfusion (e.g., encourage fluid balance; avoid crossing legs, constrictive clothing or devices) to promote tissue oxygenation.    Maintain head of bed at lowest degree of elevation tolerated, considering medical condition and other restrictions. Use positioning supports to prevent sliding and friction. Consider low friction textiles    Intervention: Optimize Skin Protection  Recent Flowsheet Documentation  Taken 1/27/2023 1150 by Jewell Lynn, RN  Activity Management: activity adjusted per tolerance  Head of Bed (HOB) Positioning: HOB at 20-30 degrees    Pt is on bed during the shift. Did not get up/ transfer on his chair. Educated patient to increase his fluid intake. Ate breakfast with good appetite. Seen by Dietician.Hoping for placement soon. Pleasant and calm. Patient refused for repositioning. PVR done result is only 27 ml. Can call and make needs known. Call light with in reach. Continue with current plan of care.   Blood pressure 123/64, pulse 76, temperature (!) 96  F (35.6  C), temperature source Oral, resp. rate 20, weight 118.1 kg (260 lb 4.8 oz), SpO2 96 %.

## 2023-01-27 NOTE — PROGRESS NOTES
SW received email from [facility].  She was requesting information.  SW emailed requested info.     NEREYDA Sharp   [facility], Transitional Care Unit   Social Work   [facility] [place] [address] () [phone]

## 2023-01-28 NOTE — PLAN OF CARE
Problem: Plan of Care - These are the overarching goals to be used throughout the patient stay.    Goal: Readiness for Transition of Care  Outcome: Progressing   Goal Outcome Evaluation:  Pt wake up late in the morning. Did not eat breakfast. Offered to order something, refused. Take medications whole. On blood sugar check.Writer offered for patient to be up on his chair, refused. Sit and eat on his bed.Continue to monitor    ......Blood pressure (!) 151/74, pulse 76, temperature 96.8  F (36  C), temperature source Oral, resp. rate 16, weight 118.1 kg (260 lb 4.8 oz), SpO2 96 %.

## 2023-01-28 NOTE — PLAN OF CARE
Goal Outcome Evaluation:    Pt A&OX4, calm, pleasant, & cooperative with care. Denied CP, SOB, & n/v. Pt is A of 2 with liko lift for transfer; was not OOB this shift. Continent for BM & can have urgency incontinence. Continent for bladder & urinal is at the bedside. Takes med whole with thin liquid. Pt slept well throughout the night. Able to make needs known & call light within reach. Will continue with plan of care.    Patient's most recent vital signs are:     Vital signs:  BP: 132/60  Temp: 96.1  HR: 74  RR: 16  SpO2: 96 %     Patient does not have new respiratory symptoms.  Patient does not have new sore throat.  Patient does not have a fever greater than 99.5.

## 2023-01-28 NOTE — PLAN OF CARE
Goal Outcome Evaluation:         Pt alert and oriented, able to make needs known, no c/o chest pain, SOB, N/V. PVR discontinued, pt able to urinate without any issues. All due meds given. Placed call light within reached. Will continue with POC.               Patient's most recent vital signs are:     Vital signs:  BP: 132/60  Temp: 96.1  HR: 74  RR: 16  SpO2: 96 %     Patient does not have new respiratory symptoms.  Patient does not have new sore throat.  Patient does not have a fever greater than 99.5.

## 2023-01-29 NOTE — PLAN OF CARE
Goal Outcome Evaluation:      Plan of Care Reviewed With: patient    Overall Patient Progress: improving    Outcome Evaluation: Patient continues to improve on oral intakes. Has been ordering outside foods and 1-2 meals from the kitchen. Encouraged adequate protein intakes and use of supplements.

## 2023-01-29 NOTE — PLAN OF CARE
Pt A&OX4. Denied Chest pain, SOB, & n/v. Pt is A of 2 with liko lift for transfer; was not OOB in this shift. Continent for BM & can have urgency incontinence. Continent for bladder & urinal is at the bedside. Takes med whole with thin liquid.Able to make needs known & call light within reach. Will continue with plan of care.     1930: No acute changes in this shift. Pt is alert and oriented . Denied pain. Pt is up sitting in recliner for few hours. Pt eat dinner bought from out side. Able to make needs known. Call light with in reach. Will continue with plan of care.  Patient's most recent vital signs are:     Vital signs:  BP: 151/63  Temp: 95.7  HR: 73  RR: 16  SpO2: 95 %     Patient does not have new respiratory symptoms.  Patient does not have new sore throat.  Patient does not have a fever greater than 99.5.

## 2023-01-29 NOTE — PROGRESS NOTES
CLINICAL NUTRITION SERVICES - REASSESSMENT NOTE     Nutrition Prescription     RECOMMENDATIONS FOR MDs/PROVIDERS TO ORDER:  Continue to encourage oral intakes and provide snacks/supplements PRN    Malnutrition Status:    Severe malnutrition in the context of acute on chronic illness.     Recommendations already ordered by Registered Dietitian (RD):  Encouraged oral intakes    Future/Additional Recommendations:  Monitor labs, intakes, and weight trends.      EVALUATION OF THE PROGRESS TOWARD GOALS   Diet: Regular  Intake/Tolerance: mostly % of documented meals, one 0%.       NEW FINDINGS/REVIEW OF SYSTEMS  Nutrition/GI: Patient states he is eating pretty well. Has been eating both outside foods and from the kitchen. Patient eating 1-2 meals per day from kitchen per health touch. States he has not been eating meals from the floor stock. Pt had 6 Ensures in his room from previous deliveries, had drank one today.      Weights: Pt seems weight stable prior to admission, down 60 lb (19%) in ~1.5 months.   Wt Readings from Last Encounters:   01/15/23 117.5 kg (259 lb)   01/12/23 119 kg (262 lb 6.4 oz)   01/09/22 119.7 kg (264 lb)   01/06/22 119.9 kg (264 lb 5.3 oz)   12/30/22 118.5 kg (261 lb 3.2 oz)   11/22/22 147.1 kg (324 lb 4.8 oz)   11/14/22 142.9 kg (315 lb)   11/09/22 146.8 kg (323 lb 9.6 oz)   05/19/22 136.1 kg (300 lb)   07/03/18 148.9 kg (328 lb 4.8 oz)     Labs reviewed    Latest Reference Range & Units 01/19/23 08:35 01/19/23 18:06 01/19/23 21:35 01/20/23 06:12 01/20/23 08:03 01/20/23 15:59   GLUCOSE  70 - 99 mg/dL 141 (H) 112 (H) 106 (H) 96 107 (H) 136 (H)      Medications reviewed: lasix, culturell, metformin, pioglitazone  IV Fluids?: No    MALNUTRITION  % Intake: </=75% for >/= 1 month (severe)  % Weight Loss: > 7.5% in 3 months (severe)   Subcutaneous Fat Loss: Upper arm:  Moderate  Muscle Loss: Temporal:  Mild, Upper arm (bicep, tricep):  Severe and Upper leg (quadricep, hamstring):  Moderate  Fluid  Accumulation/Edema: Trace-mild   Malnutrition Diagnosis: Severe malnutrition in the context of acute on chronic illness    Previous Goals   Patient to consume % of nutritionally adequate meal trays TID, or the equivalent with supplements/snacks.  Evaluation: Unable to evaluate    Previous Nutrition Diagnosis  Inadequate oral intake related to multifactorial (altered GI function, decreased appetite, dislike of food) as evidenced by pt reports    Evaluation: Improving    CURRENT NUTRITION DIAGNOSIS  Inadequate oral intake related to multifactorial (altered GI function, decreased appetite, dislike of food) as evidenced by previous poor appetite/intakes, evident muscle/fat wasting.     INTERVENTIONS  Implementation  Nutrition education for nutrition relationship to health/disease - encouraged adequate intakes    Goals  Patient to consume % of nutritionally adequate meal trays TID, or the equivalent with supplements/snacks.    Monitoring/Evaluation  Progress toward goals will be monitored and evaluated per protocol.    Dee Alanis MS, RDN, LDN  RD pager: [phone]   Weekend/Holiday Pager: [phone]

## 2023-01-30 NOTE — PLAN OF CARE
Goal Outcome Evaluation:         Pt alert and oriented X4, able to make needs known, continent of bowel and bladder.No c/o chest pain, SOB, N/V. Uses liko lift for transfers. Uses urinal and bedpan. Call light within reached. Will continue with POC.                 Patient's most recent vital signs are:     Vital signs:  BP: 125/61  Temp: 95.6  HR: 79  RR: 18  SpO2: 96 %     Patient does not have new respiratory symptoms.  Patient does not have new sore throat.  Patient does not have a fever greater than 99.5.

## 2023-01-30 NOTE — PLAN OF CARE
Goal Outcome Evaluation:    Pt A&OX4, calm, pleasant, & cooperative with care. Denied CP, SOB, & n/v. Pt is A of 2 with liko lift for transfer; was not OOB this shift. Continent for BM & can have urgency incontinence. Continent for bladder & urinal is at the bedside. Takes med whole with thin liquid. Pt slept well for most of the night. Able to make needs known & call light within reach. Will continue with POC.    Patient's most recent vital signs are:     Vital signs:  BP: 125/61  Temp: 95.6  HR: 79  RR: 18  SpO2: 96 %     Patient does not have new respiratory symptoms.  Patient does not have new sore throat.  Patient does not have a fever greater than 99.5.

## 2023-01-31 NOTE — PLAN OF CARE
"Pt refused to eat break fast , pt states\" I slept late at night I am not hungry\".Noted infiltration at R arm PIV,removed L upper arm PIV . Notified charge and provider removing  L upper arm PIV.Pt A&OX4. Denied Chest pain, SOB, & n/v. Pt is A of 2 with liko lift for transfer; was not OOB in this shift.Continent for BM & can have urgency incontinence. Continent for bladder & urinal is at the bedside.Takes med whole with thin liquid.Able to make needs known & call light within reach. Will continue with plan of care.    Patient's most recent vital signs are:     Vital signs:  BP: 125/69  Temp: 95.3  HR: 75  RR: 18  SpO2: 96 %     Patient does not have new respiratory symptoms.  Patient does not have new sore throat.  Patient does not have a fever greater than 99.5.             "

## 2023-01-31 NOTE — PLAN OF CARE
Problem: Range of Motion Impairment  Goal: Optimal Range of Motion  Outcome: Progressing     Problem: Skin Injury Risk Increased  Goal: Skin Health and Integrity  Description: Perform a full pressure injury risk assessment, as indicated by screening, upon admission to care unit.    Reassess skin (full inspection and injury risk, including skin temperature, consistency and color) frequently (e.g., scheduled interval, with change in condition) to provide optimal early detection and prevention.    Maintain adequate tissue perfusion (e.g., encourage fluid balance; avoid crossing legs, constrictive clothing or devices) to promote tissue oxygenation.    Maintain head of bed at lowest degree of elevation tolerated, considering medical condition and other restrictions. Use positioning supports to prevent sliding and friction. Consider low friction textiles    Outcome: Progressing     Problem: Hip Arthroplasty  Goal: Optimal Coping  Outcome: Progressing     Problem: Fall Injury Risk  Goal: Absence of Fall and Fall-Related Injury  Description: Provide a safe, barrier-free environment that encourages independent activity.    Keep care area uncluttered and well-lighted.    Determine need for increased observation or monitoring.    Avoid use of devices that minimize mobility, such as restraints or indwelling urinary catheter.      Outcome: Progressing     Problem: Infection  Goal: Absence of Infection Signs and Symptoms  Description: Implement transmission-based precautions and isolation, as indicated, to prevent spread of infection.    Obtain cultures prior to initiating antimicrobial therapy, when possible. Do not delay treatment for laboratory results in the presence of high suspicion or clinical indicators.    Administer ordered antimicrobial therapy promptly; reassess need regularly.    Monitor laboratory value, diagnostic test and clinical status trends for signs of infection progression.    Identify early signs of  sepsis, such as increased heart rate and decreased blood pressure, as well as changes in mental state, respiratory pattern or peripheral perfusion.    Outcome: Progressing     Problem: BADL (Basic Activities of Daily Living) Impairment  Goal: Optimal Safe BADL Performance  Description: Assess BADL (basic activity of daily living) abilities; encourage participation at maximally safe independent level.    Provide assistance and supervision needed to maintain safety; involve caregiver in BADL (basic activity of daily living) training.    Ensure effective use of equipment or devices, such as a long-handled reacher, shower seat or orthosis.    Ensure proper body mechanics and positioning for optimal task performance.    Provide set-up of items if patient is unable to retrieve; store personal care items in accessible location.    Schedule BADL (basic activity of daily living) activities when pain and fatigue are at a minimum; pace activity to conserve energy.        Outcome: Progressing     Problem: Oral Intake Inadequate  Goal: Improved Oral Intake  Description: Perform a nutrition assessment that includes a nutrition-focused physical exam; identify malnutrition risk.    Assess for adequate oral intake; if inadequate, offer oral supplemental food or drinks to enhance calorie and protein intake.    Assess for vitamin and mineral deficiencies; supplement if depleted.    Assess need and assist with with meal set-up and feeding.    Adjust diet or meal schedule based on preferences and tolerance.    Minimize unnecessary dietary restrictions to increase oral intake.    Outcome: Progressing     Problem: Diarrhea  Goal: Effective Diarrhea Management  Description: Provide fluid and electrolyte replacement to correct any imbalance through use of oral rehydration solution, nasogastric tube or intravenous fluid therapy.    Utilize skin protectant barrier to maintain skin integrity; cleanse gently, thoroughly and promptly after  stooling; avoid alcohol-containing wipes.    Continue usual diet; encourage fluids (e.g., broth, soups, fruit juices).    Keep toilet, toileting devices and aids readily accessible and barrier-free to maintain safety.    Provide comfort measures and privacy.    Outcome: Progressing     Problem: Urinary Incontinence  Goal: Effective Urinary Elimination  Description: Identify cause and contributing factors, such as disease process, medication or treatment side effects, infection or dietary bladder irritants; provide treatment.    Assess for general symptoms, such as fatigue, depression, weakness, confusion, sensory alterations that could impact toileting.    Provide safe and ready access to toileting devices and aids (e.g., commode, urinal).    Promote behavioral methods, such as prompted toileting, elimination schedule, relaxation techniques or voiding posture to facilitate flow.    Consider pelvic floor muscle strengthening, such as pelvic floor muscle training, vaginal cone, bladder support or neuromodulation.    Ensure bladder emptying (e.g., intermittent catheterization, portable bladder ultrasound after voiding).    Outcome: Progressing     Problem: Pain Chronic (Persistent)  Goal: Optimal Pain Control and Function  Description: Evaluate pain level, effect of treatment and patient response at regular intervals.    Minimize pain stimuli; coordinate care and adjust environment (e.g., light, noise, unnecessary movement); promote sleep/rest.    Match pharmacologic analgesia to severity and type of pain mechanism (e.g., neuropathic, muscle, inflammatory); consider multimodal approach (e.g., nonopioid, opioid, adjuvant).    Provide medication at regular intervals; titrate to patient response.    Manage breakthrough pain with additional doses; consider rotation or switching medication    Outcome: Progressing     Problem: Oral Mucous Membrane Integrity Impairment  Goal: Improved Oral Health  Description: Assess oral  cavity regularly.    Implement oral care program, such as brushing or swabbing oral structures, flossing or oral rinses after meals and as needed based on assessed condition and comfort level.    Promote use of bland, nonalcohol-containing rinses for hygiene and comfort.    Remove dental appliances for cleaning.    Apply moisturizer to lips and mouth to prevent dryness and maintain integrity.    Monitor and address changes in oral intake.    Assess gag reflex to determine risk for aspiration; suction carefully to remove secretions as needed.    Encourage nonirritating food and fluids when able.  Manage pain; provide topical or oral analgesia, especially before meals.    Anticipate the need for topical or oral antimicrobial agents.    Outcome: Progressing     Problem: Depression  Goal: Improved Mood  Outcome: Progressing     Problem: Pain Acute  Goal: Optimal Pain Control and Function  Outcome: Progressing   Goal Outcome Evaluation: ongoing

## 2023-01-31 NOTE — PLAN OF CARE
Goal Outcome Evaluation:    Pt A&OX4, calm, pleasant, & cooperative with care. Denied CP, SOB, & n/v. Pt is A of 2 with liko lift for transfer; was not OOB this shift. Continent for BM & can have urgency incontinence. Continent for bladder & urinal at the bedside. Takes med whole with thin liquid. Pt ate food that was ordered from restaurant yesterday. Able to make needs known & call light within reach. Will continue with plan of care.    Patient's most recent vital signs are:     Vital signs:  BP: 147/70  Temp: 95.6  HR: 77  RR: 18  SpO2: 96 %     Patient does not have new respiratory symptoms.  Patient does not have new sore throat.  Patient does not have a fever greater than 99.5.

## 2023-01-31 NOTE — PROGRESS NOTES
ZEB called nephew/Pollo to ask where in the process MA was?  Pollo has talked to the county and they will look at proofs this week. He and pt still haven't spent down the $8,000.  ZEB again suggested prepaid  .  Pollo is coming this week to talk to pt and get that figured out.  ZEB said SW will send referrals out for placement since MA and Waiver will hopefully, be done soon.    ZEB sent email and referral to:  [facility]   [facility]         NEREYDA Sharp   [facility], Transitional Care Unit   Social Work   [facility] [place] [address], [address] [place] [address]  (PH) [phone]

## 2023-01-31 NOTE — PLAN OF CARE
Goal Outcome Evaluation:     Plan of Care Reviewed With: patient    Overall Patient Progress: no change    Pt has no c/o pain or discomfort so far this shift. Was awake talking with someone over the phone until around 0130. Using urinal independently, staff empties. Pt has no c/o SOB and no s/s of respiratory issue noted at RA. Appear to be sleeping/resting between cares/ meds. Continue with plan of care.    Patient's most recent vital signs are:     Vital signs:  BP: 147/70  Temp: 95.6  HR: 77  RR: 18  SpO2: 96 %     Patient does not have new respiratory symptoms.  Patient does not have new sore throat.  Patient does not have a fever greater than 99.5.

## 2023-01-31 NOTE — PROGRESS NOTES
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 Hospital course c/b toxic encephalopathy, acute hypoxic respiratory failure d/t OHS, acute blood loss anemia, pre-renal HALEY, and profound physical deconditioning. Transferred to TCU on 12/24/22 for rehabilitation. He is no longer progressing with therapies due to both pain and lack of motivation. He is currently awaiting placement at a long term SNF.       # Chronic left hip PJI s/p explantation, debridement, and reconstruction with antibiotic spacer (11/22/22):  Per Dr. Meyers. Surgical cultures grew Finegoldia magna and Proteus mirabilis. Completed 6 wk course of oral Cipro and Flagyl on 1/3. Higher risk for treatment failure given comorbidities, sinus tract, and history of chronic PJI. Clinically stable off abx.  Saw Dr. Meyers 1/9 and will pursue second stage reimplantation surgery pending 8 wk antibiotic holiday and no recurrence of infection.   - NWB LLE.   - Continue Tylenol 1000 mg Q8H PRN   - After 8 wk antibiotic holiday (~3/1) will need left hip aspiration and testing including cell count, differential, alpha defense and synovasure, aerobic/anaerobic/fungal cultures. Provided all of this infectious workup is negative, would then proceed with second stage reimplantation surgery.   - Follow up with orthopedic surgery after L hip aspiration (not yet scheduled)      # HALEY, improving:  Cr baseline 0.9-1.1. Creatinine bumped to 1.33 on 1/25 in setting of poor PO intake. Suspect volume depletion. Renal function improved w/ holding  "diuretics and IV hydration. Repeat Cr 0.9 on 1/30.  - Encourage PO hydration   - Continue to hold lasix   - Trend BMP q MWF  - Avoid nephrotoxic agents     # DM2, well controlled:  HgbA1C 6.8% on 11/23. Blood sugars well controlled.  - Glucose checks BID  - Metformin XR 2000 mg daily   - Actos 45 mg daily  - Victoza 1.8 mg subcutaneous daily (PTA Trulicity is non formulary)   - PTA glipizide remains on hold, could reintroduce as needed for BG hyperglycemia      # Non Severe Malnutrition:  Patient was counseled by Dr. Meyers to focus on weight loss in effort to reduce morbidity of upcoming second stage reimplantation revision hip surgery.  - Nutrition following     # Peripheral neuropathy:  Pt reports distal tingling of R fingers 2-4 since surgery 11/2022. No progression or changes since. CMS intact. Possible neuropathy from surgical positioning vs prolonged FQ use vs diabetes.   - Follow up with PCP      # Intertrigo of L groin:  Continue miconazole powder and keeping area dry      # HLD:  Continue PTA lipitor 40 mg daily     # JAIR:  Continue home CPAP    # Hx DVT/PE:  Continue PTA apixaban 5 mg BID    # Venous insufficiency:  Chronic lower extremity edema. Normally managed with Lasix, currently on hold d/t HALEY.  - Monitor clinically  - Continue to hold lasix          Diet: Regular Diet Adult    DVT Prophylaxis: DOAC  Tran Catheter: Not present  Lines: None     Cardiac Monitoring: None  Code Status: Full Code      Clinically Significant Risk Factors                       # DMII: A1C = 6.8 % (Ref range: 0.0 - 5.6 %) within past 3 months   # Obesity: Estimated body mass index is 37.81 kg/m  as calculated from the following:    Height as of 1/9/23: 1.778 m (5' 10\").    Weight as of this encounter: 119.5 kg (263 lb 8 oz).   # Severe Malnutrition: based on nutrition assessment        Disposition Plan         The patient's care was discussed with the Attending Physician, Dr. Stallworth and Patient.    Lang Blakely, " DIANA  Hospitalist Service  [facility]itional [facility]  Securely message with Kelley (more info)  Text page via [employer] Paging/Directory   ______________________________________________________________________    Interval History   No acute complaints today. Reports feeling about the same. Significant LE weakness persists, requiring lift for transfers. Stable L hip pain, worse with movement.     Pt somewhat discouraged by lack of improvement and uncertainty of future surgical interventions.     Denies any fevers, chills, chest pain, dyspnea, cough, worsening edema. No GI or  complaints.     Physical Exam   Vital Signs: Temp: (!) 95.3  F (35.2  C) Temp src: Oral BP: 125/69 Pulse: 75   Resp: 18 SpO2: 96 % O2 Device: None (Room air)    Weight: 263 lbs 8 oz    General Appearance:  Awake. Alert. Oriented x3. NAD.   Respiratory:  Normal effort. CTAB.   Cardiovascular:  RRR. S1,S2. No murmurs or gallops.   GI:  Soft, ND, NT, +BS. Umbilical hernia present, easily reducible.   Extremity:  Trace pitting edema BLE. No calf tenderness.   Skin:  Surgical site on left hip healing well.    Neuro:  Grossly non-focal.  Psych:  Depressed affect.      Medical Decision Making       45 MINUTES SPENT BY ME on the date of service doing chart review, history, exam, documentation & further activities per the note.      Data         Imaging results reviewed over the past 24 hrs:   No results found for this or any previous visit (from the past 24 hour(s)).

## 2023-02-01 NOTE — PLAN OF CARE
Goal Outcome Evaluation:      Plan of Care Reviewed With: patient    Overall Patient Progress: no change    Pt has no c/o pain or discomfort so far this shift. Was awake during first rounds but has since been sleeping/resting during rounds. Using urinal in bed, staff empties. Had previously request minimal sleep interruption at night. Able to call staff appropeiately. Pt has no c/o SOB and no s/s of respiratory issue noted at RA. Appear to be sleeping/resting between cares/ meds. Continue with plan of care.    Patient's most recent vital signs are:     Vital signs:  BP: 128/69  Temp: 96.8  HR: 80  RR: 18  SpO2: 98 %     Patient does not have new respiratory symptoms.  Patient does not have new sore throat.  Patient does not have a fever greater than 99.5.

## 2023-02-01 NOTE — PLAN OF CARE
Goal Outcome Evaluation:    Pt A&OX4, calm, pleasant, & cooperative with care. Denied CP, SOB, & n/v. Pt is A of 2 with liko lift for transfer; was not OOB this shift. Continent for BM & can have urgency incontinence. Continent for bladder & urinal at the bedside. Takes med whole with thin liquid. Pt ate food that was ordered from [employer]. Pt refused to take shower this shift. Able to make needs known & call light within reach. Will continue with plan of care.    Patient's most recent vital signs are:     Vital signs:  BP: 128/69  Temp: 96.8  HR: 80  RR: 18  SpO2: 98 %     Patient does not have new respiratory symptoms.  Patient does not have new sore throat.  Patient does not have a fever greater than 99.5.

## 2023-02-02 NOTE — CARE PLAN
RN: declined out of bed, sleeping between cares. Declines turn side to side. On air bed.  using urinal and staff empties. No bm this shift.  Did allow RN and FABBY to turn onto right  Side to check buttock skin as he states painful  And tylenol given earlier with pain relief. Buttock with blanchable redness daniella cheeks, no open area noted, skin dry and rough, declines cares for area at this time. No resp distress, no cardiac problems. Lift transfer. Ate oatmeal all day x 1 at lunch time. No labs drawn today. Awaiting pt placement to [facility] or skilled care. Keep exercise bands in     Patient's most recent vital signs are:     Vital signs:  BP: 119/69  Temp: 95.6  HR: 78  RR: 18  SpO2: 96 %     Patient does not have new respiratory symptoms.  Patient does not have new sore throat.  Patient does not have a fever greater than 99.5.       reach and encourage use.

## 2023-02-02 NOTE — PLAN OF CARE
Goal Outcome Evaluation:    No acute issues during this shift , no new orders placed today.  Ate 100% of his meal for breakfast after encouraged,all due meds given. Assisted in recliner , had supper , complained of sore bottom but requested to stay in his chair and go back to bed at 2200H. Encouraged repositioning/ turning to sides when in bed, per patient he will try. Comfortable at this time. To continue POC.     B, 107     Patient's most recent vital signs are:     Vital signs:  BP: 130/61  Temp: 95.4  HR: 86  RR: 16  SpO2: 96 %     Patient does not have new respiratory symptoms.  Patient does not have new sore throat.  Patient does not have a fever greater than 99.5.

## 2023-02-02 NOTE — PLAN OF CARE
"Goal Outcome Evaluation:     Plan of Care Reviewed With: patient    Overall Patient Progress: no change    Pt has no c/o pain or discomfort. Discussed trying to reposition while in bed during midnight encounter. Pt stated \" my bottom is on fire\" which RN used to convinced pt more to try to turn or shift weight. Reassured that if he cannot tolerate it, staff can definitely help him find a different position. Pt agreed and claimed he will call in 30 mins which he did not but compliant when staff came to his room and offered to reposition him on his R side. Tolerated well back and R side with no problem. Using urinal independently, staff empties. Pt has no c/o SOB and no s/s of respiratory issue noted at RA. Appear to be sleeping/resting between cares/ meds. Continue with plan of care.    Patient's most recent vital signs are:     Vital signs:  BP: 130/61  Temp: 95.4  HR: 86  RR: 16  SpO2: 96 %     Patient does not have new respiratory symptoms.  Patient does not have new sore throat.  Patient does not have a fever greater than 99.5.        "

## 2023-02-02 NOTE — PROGRESS NOTES
CLINICAL NUTRITION SERVICES - REASSESSMENT NOTE     Nutrition Prescription     RECOMMENDATIONS FOR MDs/PROVIDERS TO ORDER:  Continue to encourage oral intakes and provide snacks/supplements PRN    Malnutrition Status:    Severe malnutrition in the context of acute on chronic illness.     Recommendations already ordered by Registered Dietitian (RD):  Encouraged oral intakes    Future/Additional Recommendations:  Monitor labs, intakes, and weight trends.      EVALUATION OF THE PROGRESS TOWARD GOALS   Diet: Regular  Intake/Tolerance: mostly % of documented meals, 1-3 meals documented daily.   Pt did not order meals 1/29-1/31. Otherwise ordering 1 meal daily, 2 meals on 2/1      NEW FINDINGS/REVIEW OF SYSTEMS    Nutrition/GI: Patient states he usually eats 2 meals daily. Sometimes eats here or eats from outside. Does not eat breakfast usually. Commended efforts to eat. Pt still had multiple Ensures in room, asked to have a couple moved closer to remind him to drink them. Encouraged pt to order ensure if needed.      Weights: Pt seems weight stable prior to admission, down 60 lb (19%) in ~1.5 months. Pt seems weight stable since admission to TCU.   Wt Readings from Last Encounters:   01/30/23 119.5 kg (263 lb 8 oz)   01/15/23 117.5 kg (259 lb)   01/12/23 119 kg (262 lb 6.4 oz)   01/09/22 119.7 kg (264 lb)   01/06/22 119.9 kg (264 lb 5.3 oz)   12/30/22 118.5 kg (261 lb 3.2 oz)   11/22/22 147.1 kg (324 lb 4.8 oz)   11/14/22 142.9 kg (315 lb)   11/09/22 146.8 kg (323 lb 9.6 oz)   05/19/22 136.1 kg (300 lb)   07/03/18 148.9 kg (328 lb 4.8 oz)     Labs reviewed     Medications reviewed: culturell, metformin, pioglitazone  IV Fluids?: No    MALNUTRITION  % Intake: </=75% for >/= 1 month (severe)  % Weight Loss: > 7.5% in 3 months (severe)   Subcutaneous Fat Loss: Upper arm:  Moderate  Muscle Loss: Temporal:  Mild, Upper arm (bicep, tricep):  Severe and Upper leg (quadricep, hamstring):  Moderate  Fluid  Accumulation/Edema: Trace-mild   Malnutrition Diagnosis: Severe malnutrition in the context of acute on chronic illness    Previous Goals   Patient to consume % of nutritionally adequate meal trays TID, or the equivalent with supplements/snacks.  Evaluation: Unable to evaluate    Previous Nutrition Diagnosis  Inadequate oral intake related to multifactorial (altered GI function, decreased appetite, dislike of food) as evidenced by previous poor appetite/intakes, evident muscle/fat wasting.   Evaluation: No change    CURRENT NUTRITION DIAGNOSIS  Inadequate oral intake related to multifactorial (altered GI function, decreased appetite, dislike of food) as evidenced by previous poor appetite/intakes, evident muscle/fat wasting.     INTERVENTIONS  Implementation  Nutrition education for nutrition relationship to health/disease - encouraged adequate intakes    Goals  Patient to consume % of nutritionally adequate meal trays TID, or the equivalent with supplements/snacks.    Monitoring/Evaluation  Progress toward goals will be monitored and evaluated per protocol.    Dee Alanis MS, RDN, LDN  RD pager: [phone]   Weekend/Holiday Pager: [phone]

## 2023-02-03 NOTE — PLAN OF CARE
Goal Outcome Evaluation:    No acute issues during this shift , no new orders placed today.  Had supper while in his recliner, had take away food, said he feels guilty for eating and spending more than he should be. Appetite fluctuates, would sometimes refused some meals. Complained of sore bottom but requested to stay in his chair and go back to bed at 2200H, Tylenol offered PRN to help tolerate repositioning due to pain to left hip but refused. Sacrum/coccyx, buttocks  appears very red to purplish, incontinence cares done , barrier cream applied , pillow applied to right hip to offload bottom ( patient not tolerating full lateral turn, can't flex even the right knee due to pain to left hip).Encouraged repositioning/ turning to sides when in bed, per patient he will try.Comfortable at this time. To continue POC      B    Patient's most recent vital signs are:     Vital signs:  BP: 119/69  Temp: 95.6  HR: 78  RR: 18  SpO2: 96 %     Patient does not have new respiratory symptoms.  Patient does not have new sore throat.  Patient does not have a fever greater than 99.5.

## 2023-02-03 NOTE — PLAN OF CARE
Goal Outcome Evaluation:    Pt A&OX4, calm, pleasant, & cooperative with care. Denied CP, SOB, & n/v. Pt is A of 2 with liko lift for transfer; was not OOB this shift. Continent for BM & can have urgency incontinence. Continent for bladder & urinal is at the bedside. Takes med whole with thin liquid. Managed pain with PRN tylenol. Pt slept well for most of the night. Able to make needs known & call light within reach. Will continue with plan of care.    Patient's most recent vital signs are:     Vital signs:  BP: 119/69  Temp: 95.6  HR: 78  RR: 18  SpO2: 96 %     Patient does not have new respiratory symptoms.  Patient does not have new sore throat.  Patient does not have a fever greater than 99.5.

## 2023-02-03 NOTE — PLAN OF CARE
Goal Outcome Evaluation:    Pt oriented x4, denies SOB, CP and GI/bladder issues. Is continent of bowel/stool and makes needs known when he feels he will need the urinal or prep for lift/bedpan. Requires Liko lift for transfers. Complaints of pain posterior shoulders bilaterally; provided heat pads. Pt is on a regular diet, on 2000 mL restriction. Refused miconazole for groin area    Provided TPump for pain behind neck.    /74 (BP Location: Right arm)   Pulse 79   Temp (!) 95.8  F (35.4  C) (Oral)   Resp 16   Wt 119.5 kg (263 lb 8 oz)   SpO2 95%   BMI 37.81 kg/m

## 2023-02-04 NOTE — PLAN OF CARE
The patient is alert and oriented x 3. VSS. Able to make needs known. Denies HA, SOB, chest pain, dizziness or N/V.  this morning. Patient lays on his back most of the time.  Declined assist with turning and repositioning stating that it hurts laying on his right side. Educated patient on pressure injury prevention and encourage to shift weight to ease pressure on his buttock. Continent of bowel and bladder. Urinal by the bedside.         Patient's most recent vital signs are:     Vital signs:  BP: 133/72  Temp: 97  HR: 80  RR: 18  SpO2: 95 %     Patient does not have new respiratory symptoms.  Patient does not have new sore throat.  Patient does not have a fever greater than 99.5.

## 2023-02-04 NOTE — PLAN OF CARE
Goal Outcome Evaluation:     Plan of Care Reviewed With: patient    Overall Patient Progress: no change    Pt has no c/o pain or discomfort so far this shift. Awake at midnight talking with someone over the phone. RN offered and encouarged staying off his bottom/back tonight like  He did 2 nights ago. Pt refused d/t having cramps upon waking up the next day. Reassured pt that staff can adjust intervals between R side and back to help minimize any aches by the morning but still refused. Staff Will continue to offer and encouraged pt to shift weight while in bed.  Fluids provided. Using urinal in bed, staff empties. Had previously request minimal sleep interruption at night. Able to call staff appropeiately. Appear to be sleeping/resting during rounds. Pt has no c/o SOB and no s/s of respiratory issue noted at RA. Appear to be sleeping/resting between cares/ meds. Continue with plan of care.    Patient's most recent vital signs are:     Vital signs:  BP: 148/74  Temp: 96.4  HR: 86  RR: 18  SpO2: 95 %     Patient does not have new respiratory symptoms.  Patient does not have new sore throat.  Patient does not have a fever greater than 99.5.

## 2023-02-04 NOTE — PROGRESS NOTES
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 Hospital course c/b toxic encephalopathy, acute hypoxic respiratory failure d/t OHS, acute blood loss anemia, pre-renal HALEY, and profound physical deconditioning. Transferred to TCU on 12/24/22 for rehabilitation. He is no longer progressing with therapies due to both pain and lack of motivation. He is currently awaiting placement at a long term SNF.       # Acute neck pain, suspect muscle strain:  Secondary to abnormal positioning in bed. No neurologic symptoms.   - Continue robaxin prn  - Tylenol prn  - Will give naproxen 500mg BID x 48 hours (limit dose d/t bleeding risk)  - Continue hot/cold packs prn    # Chronic left hip PJI s/p explantation, debridement, and reconstruction with antibiotic spacer (11/22/22):  Per Dr. Meyers. Surgical cultures grew Finegoldia magna and Proteus mirabilis. Completed 6 wk course of oral Cipro and Flagyl on 1/3. Remains clinically stable.  Saw Dr. Meyers on 1/9 who recommends 8 wk antibiotic holiday prior to re-eval for re-implantation surgery.   - NWB LLE.   - Continue Tylenol 1000 mg Q8H PRN   - Will need left hip aspiration following abx holidary with cell count, differential, alpha defense and synovasure, aerobic/anaerobic/fungal cultures. Provided all of this infectious workup is negative, would then proceed with second stage reimplantation surgery.   - Follow up with orthopedic surgery after L hip aspiration (not yet scheduled)      # HALEY, improving:  Cr baseline 0.9-1.1. Creatinine bumped to 1.33 on  "1/25 in setting of poor PO intake. Suspect volume depletion. Renal function improved w/ holding diuretics and IV hydration. Repeat Cr 0.9 on 2/3.  - Encourage PO hydration   - Continue to hold lasix   - Trend BMP q MWF  - Avoid nephrotoxic agents     # DM2, well controlled:  HgbA1C 6.8% on 11/23. Blood sugars well controlled. PTA glipizide remains on hold, may not need going forward.  - Glucose checks BID  - Metformin XR 2000 mg daily   - Actos 45 mg daily  - Victoza 1.8 mg subcutaneous daily (PTA Trulicity is non formulary)      # Non Severe Malnutrition:  Patient was counseled by Dr. Meyers to focus on weight loss in effort to reduce morbidity of upcoming second stage reimplantation revision hip surgery.  - Nutrition following     # Peripheral neuropathy:  Pt reports distal tingling of R fingers 2-4 since surgery 11/2022. No progression or changes since. CMS intact. Possible neuropathy from surgical positioning vs prolonged FQ use vs diabetes.   - Follow up with PCP      # Intertrigo of L groin:  Continue miconazole powder and keeping area dry      # HLD:  Continue PTA lipitor 40 mg daily     # JAIR:  Continue home CPAP    # Hx DVT/PE:  Continue PTA apixaban 5 mg BID    # Venous insufficiency:  Chronic lower extremity edema. Normally managed with Lasix, currently on hold d/t HALEY.  - Monitor clinically  - Continue to hold lasix          Diet: Regular Diet Adult    DVT Prophylaxis: DOAC  Tran Catheter: Not present  Lines: None     Cardiac Monitoring: None  Code Status: Full Code      Clinically Significant Risk Factors                       # DMII: A1C = 6.8 % (Ref range: 0.0 - 5.6 %) within past 3 months   # Obesity: Estimated body mass index is 37.81 kg/m  as calculated from the following:    Height as of 1/9/23: 1.778 m (5' 10\").    Weight as of this encounter: 119.5 kg (263 lb 8 oz).   # Severe Malnutrition: based on nutrition assessment        Disposition Plan         The patient's care was discussed with the " "Attending Physician, Dr. Stallworth and Patient.    Lang Blakely PA-C  Hospitalist Service  [facility]  Securely message with Kelley (more info)  Text page via [facility] Paging/Directory   ______________________________________________________________________    Interval History   Pt reports new neck pain today. Reports being repositioned overnight to offload pressure on buttocks, resulting in a \"kink in neck.\" Otherwise doing OK. No other complaints.     Physical Exam   Vital Signs: Temp: 97  F (36.1  C) Temp src: Oral BP: 133/72 Pulse: 80   Resp: 18 SpO2: 95 % O2 Device: None (Room air)    Weight: 263 lbs 8 oz    General Appearance:  Awake. Alert. Oriented x3. NAD.   Respiratory:  Normal effort. CTAB.   Cardiovascular:  RRR. S1,S2. No murmurs or gallops.   GI:  Soft, ND, NT, +BS.    Extremity:  No pitting edema. No calf tenderness.    Neuro:  Grossly non-focal.     Medical Decision Making       30 MINUTES SPENT BY ME on the date of service doing chart review, history, exam, documentation & further activities per the note.      Data         Imaging results reviewed over the past 24 hrs:   No results found for this or any previous visit (from the past 24 hour(s)).  "

## 2023-02-04 NOTE — PLAN OF CARE
Goal Outcome Evaluation:    Pt A&OX4, calm, pleasant, & cooperative with care. Denied CP, SOB, & n/v. Pt is A of 2 with liko lift for transfer; was not OOB this shift. Continent for BM & can have urgency incontinence. LBM at the beginning of this shift. Continent for bladder & urinal is at the bedside. Takes med whole with thin liquid. Managed pain with PRN tylenol. Pt c/o neck pain & headache; requested for oxycodone & muscle relaxer. Notified provider; provider ordered PRN robaxin. PRN robaxin given. Pt ate dinner from [employer] with good appetite. Able to make needs known & call light within reach. Will continue with plan of care.    Patient's most recent vital signs are:     Vital signs:  BP: 148/74  Temp: 96.4  HR: 86  RR: 18  SpO2: 95 %     Patient does not have new respiratory symptoms.  Patient does not have new sore throat.  Patient does not have a fever greater than 99.5.

## 2023-02-05 NOTE — PLAN OF CARE
The patient is alert and oriented x 3. VSS. Able to make needs known. Denies HA, SOB, chest pain, dizziness or N/V. Continue to c/o neck and left hip pain. Yesterday, Robaxin twice daily was added to pain medication regimen. Patient stated that the robaxin is slightly effective. Patient's neck is mostly in one position. Encouraged neck rotation to ease pain and or stiffness. Aqua K pad to neck for comfort. Patient continue to decline turning and repositioning. Educated patient on pressure injury prevention and encourage to shift weight to ease pressure on his buttock. Continent of bowel and bladder. Bed pan for toileting and uses urinal by the bedside appropriately. Continue to monitor for comfort.        Patient's most recent vital signs are:     Vital signs:  BP: 118/64  Temp: 96.3  HR: 77  RR: 16  SpO2: 94 %     Patient does not have new respiratory symptoms.  Patient does not have new sore throat.  Patient does not have a fever greater than 99.5.

## 2023-02-05 NOTE — PLAN OF CARE
Goal Outcome Evaluation:    [id number]    Pt is alert and oriented x4, denies fever, chills, chest pain, SOB, N/V, abdominal pain, or new weakness/numbness/tingling. Reported pain in L hip and neck received prn tylenol and declined further intervention. Incision BRII. Declined to reposition when offered. Declined full skin assessment. Incontinent of bowel x1 attempted to use bed pan as well. no tubes, lines or drains, vs stable, no further care concerns at this time continue with POC.           Patient's most recent vital signs are:     Vital signs:  BP: 131/73  Temp: 96.9  HR: 78  RR: 16  SpO2: 97 %     Patient does not have new respiratory symptoms.  Patient does not have new sore throat.  Patient does not have a fever greater than 99.5.

## 2023-02-05 NOTE — PLAN OF CARE
Goal Outcome Evaluation:     Plan of Care Reviewed With: patient    Overall Patient Progress: no change    Pt has no c/o pain or discomfort so far this shift. Refused offer for repositioning but has pillow propping bottom. [employer] has issue tilting pt more to the right per pt which also contributing to discomfort when changing position in bed. Will ff-up with [employer] Monday. Pt aware. Using urinal independently, staff empties. Pt has no c/o SOB and no s/s of respiratory issue noted at RA. Appear to be sleeping/resting between cares/ meds. Continue with plan of care.    Patient's most recent vital signs are:     Vital signs:  BP: 131/73  Temp: 96.9  HR: 78  RR: 16  SpO2: 97 %     Patient does not have new respiratory symptoms.  Patient does not have new sore throat.  Patient does not have a fever greater than 99.5.

## 2023-02-05 NOTE — PLAN OF CARE
Goal Outcome Evaluation:        Patient sitting on bedpan hoping to pass a stool. Resting on green liko mat, refuses repositioning. Reports of pain in stiff neck, said robaxin helps.  VSS, A/Ox4. Able to make needs known. Denies SOB, Continent of B/B, using handheld urinal    /73 (BP Location: Right arm)   Pulse 78   Temp 96.9  F (36.1  C) (Oral)   Resp 16   Wt 119.5 kg (263 lb 8 oz)   SpO2 97%   BMI 37.81 kg/m

## 2023-02-06 NOTE — PLAN OF CARE
0700H-2300H    Goal Outcome Evaluation:  No acute issues during this shift. Order placed by provider for CPAP ( own machine) at bedtime. Patient refused breakfast , DM meds ( Actos, Glucophage, Victoza) all given at noon with lunch ( ate 100% of his meal) . Patient unable to reposition/ turn to his sides  due to pain to left hip , unable to fully clean sacrum/coccyx as patient not tolerating repositioning, cleaned bottom when up with lift in bed per patient's preference. Sacrum/ coccyx  noted with abrasions, excoriation, incontinence cares done, barrier cream applied .Had supper while seated in his recliner , fair appetite. Bed mattress changed, feels comfortable for now per patient. To continue POC.     B at bedtime         Patient's most recent vital signs are:     Vital signs:  BP: 130/63  Temp: 95.8  HR: 81  RR: 18  SpO2: 95 % , CPAP at bedtime     Patient does not have new respiratory symptoms.  Patient does not have new sore throat.  Patient does not have a fever greater than 99.5.

## 2023-02-06 NOTE — PROGRESS NOTES
SW left a vm for [facility] asking if they received our referral?  Please return call or email.   SW was told they need pt to have 2 years worth of private pay before going on EW.     SW received email from [facility].  They do not have a room on the main floor for pt.     SW sent referrals to:    [facility]:  [facility]:  [facility]:  [facility]:  [facility]:  [facility]:  [facility]:  [facility]:  [facility]:  [facility]:  [facility]:     [facility]:  Private pay with a waitlist.   [facility]: they don't take EW.       NEREYDA Sharp   [facility], Transitional Care Unit   Social Work   [facility] [address] [place] [address]  ) [phone]

## 2023-02-06 NOTE — PLAN OF CARE
Goal Outcome Evaluation: no change    Cognition: Pt is AOx4  Pain: Reported mild neck pain, relieved by aqua k pad and scheduled pain meds. Repositioning encouraged.  Skin: No new concerns, aside from redness on bottom.  Bowel/bladder: Continent of bowel and bladder.  [facility] refuses re[facility]ing.  Vital signs: VSS. Denied SOB, chest pain, numbness/tingling.  Updates: Pt requested CPAP to wear overnight. Writer set up CPAP for pt at .  Diet: reg/thin/whole  BGs stable

## 2023-02-06 NOTE — PLAN OF CARE
Goal Outcome Evaluation:      Plan of Care Reviewed With: patient    Overall Patient Progress: no change    Pt has no c/o pain or discomfort so far this shift. Refused offer for repositioning but has pillow propping bottom. [employer] has issue tilting pt more to the right per pt which also contributing to discomfort when changing position in bed. Will ff-up with [employer] company Monday. Pt aware. Using urinal independently, staff empties. Tolerating CPAP- own but needs order. Note left. Pt has no c/o SOB and no s/s of respiratory issue noted at RA. Appear to be sleeping/resting between cares/ meds. Continue with plan of care.    Patient's most recent vital signs are:     Vital signs:  BP: 120/72  Temp: 96.2  HR: 90  RR: 16  SpO2: 96 %     Patient does not have new respiratory symptoms.  Patient does not have new sore throat.  Patient does not have a fever greater than 99.5.             ,

## 2023-02-07 NOTE — PLAN OF CARE
Pt A&O x4. Liko lift. No complaints of pain. Declined repositioning. AM BG was 95. Continue w/ plan of care.

## 2023-02-07 NOTE — CARE PLAN
Pt is alert and oriented x4, denies fever, chills, chest pain , SOB,N/V, abdominal pain. Pt incontinent of bowel, uses urinal at bedside. Pt slept well throughout the shift. Safety rounds completed. No lines, tubes, or drains. Nursing will continue with poc.      Patient's most recent vital signs are:     Vital signs:  BP: 130/63  Temp: 95.8  HR: 81  RR: 18  SpO2: 95 %     Patient does not have new respiratory symptoms.  Patient does not have new sore throat.  Patient does not have a fever greater than 99.5.

## 2023-02-08 NOTE — PROGRESS NOTES
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 Hospital course c/b toxic encephalopathy, acute hypoxic respiratory failure d/t OHS, acute blood loss anemia, pre-renal HALEY, and profound physical deconditioning. Transferred to TCU on 12/24/22 for rehabilitation. He is no longer progressing with therapies due to both pain and lack of motivation. He is currently awaiting placement at a long term SNF.    Chronic left hip PJI s/p explantation, debridement, and reconstruction with antibiotic spacer (11/22/22): With Dr. Meyers. Surgical cultures grew Finegoldia magna and Proteus mirabilis. Completed 6 wk course of oral Cipro and Flagyl on 1/3. Remains clinically stable.  Saw Dr. Meyers on 1/9 who recommends 8 wk antibiotic holiday prior to re-eval for re-implantation surgery.    - NWB to LLE   - Tylenol 1000 mg TID PRN   - Following 8 week antibiotic holiday will need left hip aspiration (due ~2/28) with the following labs: cell count, differential, alpha defense and synovasure, aerobic/anaerobic/fungal cultures. If this infectious work-up is negative then would proceed with second stage reimplantation surgery.   - Follow-up with Orthopedic Surgery after left hip aspiration (not yet scheduled)    Acute neck pain: Intermittent throughout TCU stay. Suspect muscle strain secondary to abnormal positioning in bed.    - Tylenol, Robaxin, heat PRN    HALEY, resolved: Cr baseline 0.9-1.1. Creatinine bumped to 1.33 on 1/25 in setting of poor PO intake. Suspect volume depletion. Renal function improved w/ holding diuretics and IV  hydration.    - Hold Lasix   - BMP qMTh    DM2: Well-controlled. A1C 6.8% in November. PTA on glipizide, remains on hold d/t good BG control.   - Metformin 2000 mg daily   - Actos 45 mg daily   - Victoza 1.8 mg daily (substitute for PTA Trulicity)   - Continue to hold glipizide, can likely discontinue at discharge   - BG checks BID    Venous insufficiency: With chronic lower extremity edema - edema currently resolved.    - Continue to monitor   - Lasix held d/t HALEY, could consider resuming if edema recurs    Peripheral neuropathy: Pt reports distal tingling of R fingers 2-4 since surgery 11/2022. No progression or changes since. CMS intact. Possible neuropathy from surgical positioning vs prolonged FQ use vs diabetes.    - Follow up with PCP     Non-severe malnutrition: Patient was counseled by Dr. Meyers to focus on weight loss in effort to reduce morbidity of upcoming second stage reimplantation revision hip surgery.   - RD following    Intertrigo of L groin: Continue miconazole powder and keeping area dry     Physical deconditioning: Completed PT/OT.     Other Stable Medical Problems:   Hx of DVT/PE: Continue PTA apixaban 5 mg BID.  JAIR: Continue home CPAP.  HLD: Continue PTA Lipitor 40 mg daily.           CODE: FULL  DVT: DOAC  Diet: Regular  Indications for Psychotropic Medications: None  Disposition: Awaiting LTCF placement    Diana Farias PA-C  Hospitalist Service  Pager: [phone]  ________________________________________________________________    Subjective & Interval History:  No acute concerns today. Getting some relief of his neck pain with heating pad. Tolerating regular diet. No chest pain, dyspnea, fevers.  Pain overall controlled.     Last 24 hour care team notes reviewed.   ROS: 4 point ROS (including Respiratory, CV, GI and ) was performed and negative unless otherwise noted in HPI.     Medications: Reviewed in EPIC.    Physical Exam:    Blood pressure 119/69, pulse 70, temperature (!)  96.3  F (35.7  C), temperature source Oral, resp. rate 18, weight 119.5 kg (263 lb 8 oz), SpO2 95 %.    Constitutional: Awake and alert, in no apparent distress. Resting in bed.   Eyes: Sclera clear, anicteric   Respiratory: Breathing non-labored. CTAB. Good air entry.   Cardiovascular:  RRR, normal S1/S2. No rubs or murmurs.  No peripheral edema.   GI: Soft, non-tender, non-distended. Normoactive bowel sounds.   Skin:  Good color. No jaundice. No visible rashes, lesions, or bruising of concern.   Neurologic: Alert and fully oriented. No focal deficits.         Lines/Tubes/Drains:      Incision/Surgical Site 11/22/22 Left Hip (Active)   Incision Assessment WDL 02/07/23 1700   Daria-Incision Assessment UTV 02/05/23 1600   Closure Approximated 02/06/23 1200   Incision Drainage Amount None 02/06/23 1200   Incision Care Ice applied 12/21/22 0300   Dressing Intervention Open to air / No Dressing;Other (Comment) 02/08/23 0346   Number of days: 78

## 2023-02-08 NOTE — PROGRESS NOTES
[facility] following up on LTC placement referrals.      [facility] sent referrals on 2/6 to:     [facility]: Left VM on 2/8  [facility]: Fax failed on 2/6. Refaxed 2/8, but this failed as well. [facility] called and received the fax number for the admissions department: [phone]. [facility] faxed referral.  [facility]: Left VM on 2/8  [facility]): Fax failed on 2/6. Refaxed 2/8, but this failed as well. [facility] called and received the fax number for the admissions department: [phone]. [facility]EB faxed referral.  [facility]: Fax failed on 2/6. Refaxed 2/8, but this failed as well. [facility] called and received the fax number for the admissions department: [phone]. Z[facility] faxed referral.  [facility]: Fax failed on 2/6. Refaxed 2/8 to [phone].     Denials  [facility]:  Private pay with a waitlist.   [facility]: they don't take EW.   [facility]: TCU/LTC/SNF is full.  [facility]: All admissions are on hold until March 1st.   [facility]'t [facility]. Reports pt needs more skilled nursing than what they can provide.  [facility] Left VM on 2/8. [facility] called [facility] and Don was denied due to wallace lift.   [facility]: Does not accept EW.    NEREYDA Shaffer  Post Acute Float   ARU/TCU/LTACH    Phone: [phone]  Fax: [phone]  Pager: [phone]

## 2023-02-08 NOTE — PROGRESS NOTES
[facility] called [facility].  They can't take someone with wallace lift.     ZEB left a vm for [facility] [phone]  Please return call.     NEREYDA Sharp   [facility], Transitional Care Unit   Social Work   [facility] [address] [place] [address]  () [phone]

## 2023-02-08 NOTE — PLAN OF CARE
Goal Outcome Evaluation:  No acute issues during this shift. With new bed mattress from yesterday , wants it to be more firm, increased firmness level to 20 from 15 as requested, feels comfortable after per patient. Patient unable to reposition/ turn to his sides  due to pain to left hip , unable to fully clean sacrum/coccyx as patient not tolerating repositioning, cleaned bottom when up with lift in bed per patient's preference,barrier cream applied , no BM during this shift.On CPAP ( own machine ) at bedtime. To continue POC.    HB, 157        Patient's most recent vital signs are:     Vital signs:  BP: 123/70  Temp: 96.9  HR: 72  RR: 18  SpO2: 97 %     Patient does not have new respiratory symptoms.  Patient does not have new sore throat.  Patient does not have a fever greater than 99.5.

## 2023-02-08 NOTE — PLAN OF CARE
The patient is alert and oriented x 3. VSS. Able to make needs known. Denies HA, SOB, chest pain, dizziness or N/V. Patient stated that new bed is fair than previous one. Stated that he has been using the bed to turn and repositioned. Patient was found laying on his back at every safety check. Patient declined turning and repositioning in the presence of staff. Educated patient on pressure injury prevention and encourage to shift weight to ease pressure on his buttock. Aqua k pad to neck for comfort. Continent of bowel and bladder. Bed pan for toileting and uses urinal by the bedside appropriately. Transfer with liko lift. Continue to monitor for comfort.          Patient's most recent vital signs are:     Vital signs:  BP: 119/69  Temp: 96.3  HR: 70  RR: 18  SpO2: 95 %     Patient does not have new respiratory symptoms.  Patient does not have new sore throat.  Patient does not have a fever greater than 99.5.

## 2023-02-08 NOTE — PLAN OF CARE
Goal Outcome Evaluation:     Plan of Care Reviewed With: patient    Overall Patient Progress: no change    Pt has no c/o pain or discomfort so far this shift. Pt claimed mattress is much better than previous one. He prefers it hard therefore customed/ adjusted previously at max rigidity at 20. Refused offer to assist to  reposition in bed d/t per pt he is able to shift weight in bed by either micro turning to his R side, tilting bed and adjusting self using B upper SR and etc.Pt never seen laying other than on his back when in bed.  Aware of excoriated bottom but continue refused assistance.  New mattress should also help at least in distributing/ redistributing air and managing moisture. Staff to continue to encourage and assist with bed mobility. Using urinal in bed, staff empties. Pt has no c/o SOB and no s/s of respiratory issue noted at RA. Appear to be sleeping/resting between cares/ meds. Continue with plan of care.    Patient's most recent vital signs are:     Vital signs:  BP: 123/70  Temp: 96.9  HR: 72  RR: 18  SpO2: 97 %     Patient does not have new respiratory symptoms.  Patient does not have new sore throat.  Patient does not have a fever greater than 99.5.

## 2023-02-09 NOTE — PLAN OF CARE
The patient is alert and oriented x 3. VSS. Able to make needs known. Denies HA, SOB, chest pain, dizziness or N/V. . Patient continue to decline turning and repositioning. Educated patient on pressure injury prevention and encourage to shift weight to ease pressure on his buttock. Aqua k pad to neck for comfort. Continent of bowel and bladder. Bed pan for toileting and uses urinal by the bedside appropriately. Transfer with liko lift. Continue to monitor for comfort.        Patient's most recent vital signs are:     Vital signs:  BP: 132/74  Temp: 96  HR: 69  RR: 18  SpO2: 96 %     Patient does not have new respiratory symptoms.  Patient does not have new sore throat.  Patient does not have a fever greater than 99.5.

## 2023-02-09 NOTE — PLAN OF CARE
Goal Outcome Evaluation:      Plan of Care Reviewed With: patient    Overall Patient Progress: no change    Pt has no c/o pain or discomfort so far this shift. Awake at midnight watching TV. Refused offer of assistance with repositioning claiming he is able to shift weight as needed. Also has newly replaced  Mattress which also helps with redistributing pressure and moisture. Using urinal independently, staff empties. Tolerating own CPAP. Pt has no c/o SOB and no s/s of respiratory issue noted. Appear to be sleeping/resting between cares/ meds. Continue with plan of care.     Patient's most recent vital signs are:     Vital signs:  BP: 142/67  Temp: 96.5  HR: 78  RR: 17  SpO2: 95 %     Patient does not have new respiratory symptoms.  Patient does not have new sore throat.  Patient does not have a fever greater than 99.5.

## 2023-02-09 NOTE — PROGRESS NOTES
CLINICAL NUTRITION SERVICES - REASSESSMENT NOTE     Nutrition Prescription     RECOMMENDATIONS FOR MDs/PROVIDERS TO ORDER:  Please obtain new weight    Malnutrition Status:    Severe malnutrition in the context of acute on chronic illness.    Recommendations already ordered by Registered Dietitian (RD):  PRN snacks/supplements    Future/Additional Recommendations:  Monitor labs, intakes, and weight trends.      EVALUATION OF THE PROGRESS TOWARD GOALS   Diet: Regular  Intake/Tolerance: mostly % of documented meals, 1-2 meals documented daily.     Pt ordering (on average) 900 kcal and 27 g protein per day per [employer]. Also has food from outside.       NEW FINDINGS/REVIEW OF SYSTEMS    Nutrition/GI: Patient states he is eating the same as last week, usually 2 meals per day, some outside food and some from here. No questions or concerns. Still has a couple Ensures left to drink, agreeable to ordering them as needed once they are gone. Commended efforts and weight stability since admission.      Weights: Pt seems weight stable prior to admission, down 60 lb (19%) in ~1.5 months. Pt seems weight stable since admission to TCU.   Wt Readings from Last Encounters:   01/30/23 119.5 kg (263 lb 8 oz)   01/15/23 117.5 kg (259 lb)   01/12/23 119 kg (262 lb 6.4 oz)   01/09/22 119.7 kg (264 lb)   01/06/22 119.9 kg (264 lb 5.3 oz)   12/30/22 118.5 kg (261 lb 3.2 oz)   11/22/22 147.1 kg (324 lb 4.8 oz)   11/14/22 142.9 kg (315 lb)   11/09/22 146.8 kg (323 lb 9.6 oz)   05/19/22 136.1 kg (300 lb)   07/03/18 148.9 kg (328 lb 4.8 oz)     Labs reviewed     Medications reviewed: culturell, metformin, pioglitazone  IV Fluids?: No    MALNUTRITION   % Intake: </=75% for >/= 1 month (severe)   % Weight Loss: > 7.5% in 3 months (severe)   Subcutaneous Fat Loss: Upper arm:  Moderate  Muscle Loss: Temporal:  Mild, Upper arm (bicep, tricep):  Severe and Upper leg (quadricep, hamstring):  Moderate  Fluid Accumulation/Edema: Trace-mild    Malnutrition Diagnosis: Severe malnutrition in the context of acute on chronic illness    Previous Goals   Patient to consume % of nutritionally adequate meal trays TID, or the equivalent with supplements/snacks.  Evaluation: Unable to evaluate    Previous Nutrition Diagnosis  Inadequate oral intake related to multifactorial (altered GI function, decreased appetite, dislike of food) as evidenced by previous poor appetite/intakes, evident muscle/fat wasting.   Evaluation: No change    CURRENT NUTRITION DIAGNOSIS  Inadequate oral intake related to multifactorial (altered GI function, decreased appetite, dislike of food) as evidenced by previous poor appetite/intakes, evident muscle/fat wasting.     INTERVENTIONS  Implementation  PRN snacks/supplements    Goals  Patient to consume % of nutritionally adequate meal trays TID, or the equivalent with supplements/snacks.    Monitoring/Evaluation  Progress toward goals will be monitored and evaluated per protocol.    Dee Alanis MS, RDN, LDN  RD pager: [phone]   Weekend/Holiday Pager: [phone]

## 2023-02-09 NOTE — PLAN OF CARE
Patient is alert and oriented x4. Able to make needs known to staff. Patient denied chest pain and SOB. Continent of both bowel and bladder. Bed pan provided upon request. Attempted to reposition but reported pain to left lateral hip w/ movement so he refused. Call-light within reach at all times. No concerns noted this shift.    Patient's most recent vital signs are:     Vital signs:  BP: 142/67  Temp: 96.5  HR: 78  RR: 17  SpO2: 95 %     Patient does not have new respiratory symptoms.  Patient does not have new sore throat.  Patient does not have a fever greater than 99.5.

## 2023-02-09 NOTE — PROGRESS NOTES
ZEB received call from  Emperatriz/Lashawn Diego [phone]  they dont take anyone with a wallace lift. Please talk to Mona.     ZEB called Mona [phone]  at [facility] and left a vm about referral being sent.   Sent referral.     ZEB emailed nephew/Seniake to find out if they spent spenddown.  Please respond.     ZEB talked to Lashawn antoine/Bo [phone].  Fax number is [phone].  They do take EW with no restrictions. ZEB faxed referral to him.       NEREYDA Sharp   [facility], Transitional Care Unit   Social Work   [facility] [address]  [place], [place] [address] (ph) [phone]

## 2023-02-10 NOTE — PROGRESS NOTES
SW received a call from the [facility] - they received SW's call on 2/8, but did not receive a faxed referral this week. The [facility] has some availability for FDC beds, but it depends on pt's needs. SW refaxed the [facility] referral.     SW received a call back from the [facility] - they do not accept  funding so they will decline the referral.    NEREYDA Shaffer  Post Acute Float   ARU/ISABEL/JENNYFER    Phone: [phone]  Fax: [phone]  Pager: [phone]

## 2023-02-10 NOTE — PLAN OF CARE
Goal Outcome Evaluation:    Patient is AOx4,able to make needs known. Declining repositioning, denied pain, discomfort, CP or SOB.Wore a CPAP at night with home settings. No new concerns. Verbalized stiff neck this AM, prn tylenol given      Patient's most recent vital signs are:     Vital signs:  BP: 129/54  Temp: 95.1  HR: 72  RR: 18  SpO2: 96 %     Patient does not have new respiratory symptoms.  Patient does not have new sore throat.  Patient does not have a fever greater than 99.5.

## 2023-02-10 NOTE — PLAN OF CARE
Goal Outcome Evaluation:  No acute issues during this shift. Continues to decline repositioning/turning to sides when in bed due to pain/ tenderness to left hip . VSS , denies SOB, on CPAP ( own machine at bedtime). Comfortable at this time , to continue POC.     Patient's most recent vital signs are:     Vital signs:  BP: 129/54  Temp: 95.1  HR: 72  RR: 18  SpO2: 96 %     Patient does not have new respiratory symptoms.  Patient does not have new sore throat.  Patient does not have a fever greater than 99.5.

## 2023-02-10 NOTE — PROGRESS NOTES
ZEB received email from aidan/Pollo.  He said he has been in contact with the county but they are backed up. They haven't reviewed pt's case yet. Nephew is continuing to try to get pt on MA.    SW received a call from the [facility].  They don't take EW until they have been there 2 yrs private pay.     ZEB left a vm  [email or web address] [phone].  One home is in [place].   They only take Dementia pts.     ZEB received an message stating [facility] doesn't have a bed now.  SW asked when a bed might be come available.     ZEB met with pt.  Updated pt on MA status and referral status.       NEREYDA Sharp   [facility], Transitional Care Unit   Social Work   [facility] [address] [place] [address]  () [phone]

## 2023-02-10 NOTE — PLAN OF CARE
"0700H-2300H    Goal Outcome Evaluation:    No recent changes during this shift. Patient slept through 1000H , refused to order breakfast as he doesn't normally eat breakfast but eats lunch instead , all DM meds administered late with with his lunch meal. Assisted to sit on his recliner at 1200H. Declining repositioning due to discomfort to left hip when assisted back to bed. With abrasions and excoriation to his bottom noted during incontinence cares by staff. Patient already in bed and refused to have his bottom check by writer. Said it is \"okay\" at this time. Mentioned interest about putting a pad/ foam dressing to sore areas on his bottom but is not ready to do this now but the next time he needs to be cleaned per patient. Mentioned that transfer/ green sling gets too  tight when he's up and is causing the pressure to his bottom.  Emphasized benefit of repositioning to sides will help offload pressure and would be better for incontinence cares rather than having him up on the sling as he preferred. Will continue to monitor for any significant changes.  To continue POC.     Patient's most recent vital signs are:     Vital signs:  BP: 114/69  Temp: 96.7  HR: 79  RR: 18  SpO2: 97 % /On CPAP at bedtime    Patient does not have new respiratory symptoms.  Patient does not have new sore throat.  Patient does not have a fever greater than 99.5.                                 "

## 2023-02-11 NOTE — PLAN OF CARE
Goal Outcome Evaluation:    Pt is alert and oriented x4, denies fever, chills, chest pain , SOB,N/V, abdominal pain. Pt incontinent of bowel, uses urinal at bedside. Pt slept well throughout the shift. Safety rounds completed. No lines, tubes, or drains. Nursing will continue with poc.      Patient's most recent vital signs are:     Vital signs:  BP: 114/69  Temp: 96.7  HR: 79  RR: 18  SpO2: 97 %     Patient does not have new respiratory symptoms.  Patient does not have new sore throat.  Patient does not have a fever greater than 99.5.

## 2023-02-11 NOTE — PLAN OF CARE
Pt is alert and oriented x 4. Pt denied chest pain, SOB and N/V. Pt able to make needs known. Pt refused breakfast eat lunch.pt had incontinent of medium BM x1. Pt refused to reposition. Applied form dressing on ingrown area. Noted redness in both buttocks , applied barrier cream. Pt uses urinal at bed side. Call light with in reach. Continue with plan of care.    Patient's most recent vital signs are:     Vital signs:  BP: 118/75  Temp: 95.9  HR: 74  RR: 18  SpO2: 95 %     Patient does not have new respiratory symptoms.  Patient does not have new sore throat.  Patient does not have a fever greater than 99.5.

## 2023-02-12 NOTE — PLAN OF CARE
Goal Outcome Evaluation:  Foam dressing applied to inner thighs in AM  to protect skin as sling is rubbing on this during transfers per report. Per patient this is not helping , and foam is not placed not on the spot where he wants it. Patient was not ready when approached to have this done and will call when he's ready. Was approached again and said he feels okay atb this time. Will want this done tomorrow instead. On CPAP at bedtime. Comfortable, to continue POC.       Patient's most recent vital signs are:     Vital signs:  BP: 117/49  Temp: 97.2  HR: 84  RR: 18  SpO2: 94 %     Patient does not have new respiratory symptoms.  Patient does not have new sore throat.  Patient does not have a fever greater than 99.5.

## 2023-02-12 NOTE — CARE PLAN
Pt is alert and oriented x4, denies fever, chills, chest pain , SOB,N/V, abdominal pain. Pt incontinent of bowel, uses urinal at bedside. Pt slept well throughout the shift. Safety rounds completed. No lines, tubes, or drains. Nursing will continue with poc.    Patient's most recent vital signs are:     Vital signs:  BP: 117/49  Temp: 97.2  HR: 84  RR: 18  SpO2: 94 %     Patient does not have new respiratory symptoms.  Patient does not have new sore throat.  Patient does not have a fever greater than 99.5.

## 2023-02-12 NOTE — PLAN OF CARE
Goal Outcome Evaluation:         Pt alert and oriented X4, able to make needs known, no c/o chest pain, SOB, N/V, Continent of bowel and bladder, uses urinal and bedpan, applied mepilex dressing to buttocks and coccyx for skin excoriation for protection. Pt slept until 10:30 am didn't eat breakfast but had lunch around 1:30pm. Pleasant and cooperative. Will continue with POC.               Patient's most recent vital signs are:     Vital signs:  BP: 126/58  Temp: 95.2  HR: 73  RR: 18  SpO2: 95 %     Patient does not have new respiratory symptoms.  Patient does not have new sore throat.  Patient does not have a fever greater than 99.5.

## 2023-02-12 NOTE — PROGRESS NOTES
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 Hospital course c/b toxic encephalopathy, acute hypoxic respiratory failure d/t OHS, acute blood loss anemia, pre-renal HALEY, and profound physical deconditioning. Transferred to TCU on 12/24/22 for rehabilitation. He is no longer progressing with therapies due to both pain and lack of motivation. He is currently awaiting placement at a long term SNF.     Chronic left hip PJI s/p explantation, debridement, and reconstruction with antibiotic spacer (11/22/22): With Dr. Meyers. Surgical cultures grew Finegoldia magna and Proteus mirabilis. Completed 6 wk course of oral Cipro and Flagyl on 1/3. Remains clinically stable.  Saw Dr. Meyers on 1/9 who recommends 8 wk antibiotic holiday prior to re-eval for re-implantation surgery.    - NWB to LLE   - Tylenol 1000 mg TID PRN   - Following 8 week antibiotic holiday will need left hip aspiration (due ~2/28) with the following labs: cell count, differential, alpha defense and synovasure, aerobic/anaerobic/fungal cultures. If this infectious work-up is negative then would proceed with second stage reimplantation surgery.   - Follow-up with Orthopedic Surgery after left hip aspiration (not yet scheduled)     Acute neck pain: Intermittent throughout TCU stay. Suspect muscle strain secondary to abnormal positioning in bed.    - Tylenol, Robaxin, heat PRN     HALEY, resolved: Cr baseline 0.9-1.1. Creatinine bumped to 1.33 on 1/25 in setting of poor PO intake. Suspect volume depletion. Renal function improved w/ holding diuretics and  IV hydration.    - BMP qMTh     DM2: Well-controlled. A1C 6.8% in November. PTA on glipizide, remains on hold d/t good BG control.   - Metformin 2000 mg daily   - Actos 45 mg daily   - Victoza 1.8 mg daily (substitute for PTA Trulicity)   - Continue to hold glipizide, can likely discontinue at discharge   - BG checks BID     Venous insufficiency: With chronic lower extremity edema - edema currently resolved.    - Continue to monitor   - Lasix held d/t HALEY, could consider resuming if edema recurs     Peripheral neuropathy: Pt reports distal tingling of R fingers 2-4 since surgery 11/2022. No progression or changes since. CMS intact. Possible neuropathy from surgical positioning vs prolonged FQ use vs diabetes.    - Follow up with PCP      Non-severe malnutrition: Patient was counseled by Dr. Meyers to focus on weight loss in effort to reduce morbidity of upcoming second stage reimplantation revision hip surgery.   - RD following     Intertrigo of L groin: Continue miconazole powder and keeping area dry      Physical deconditioning: Completed PT/OT.      Other Stable Medical Problems:   Hx of DVT/PE: Continue PTA apixaban 5 mg BID.  JAIR: Continue home CPAP.  HLD: Continue PTA Lipitor 40 mg daily.               CODE: FULL  DVT: DOAC  Diet: Regular  Indications for Psychotropic Medications: None  Disposition: Awaiting long term care placement  Diana Farias PA-C  Hospitalist Service  Pager: [phone]  ________________________________________________________________    Subjective & Interval History:    Rahat has no medical concerns or complaints today. He is feeling tired and wishes to continue resting. Pain controlled.     Last 24 hour care team notes reviewed.   ROS: 4 point ROS (including Respiratory, CV, GI and ) was performed and negative unless otherwise noted in HPI.     Medications: Reviewed in EPIC.    Physical Exam:    Blood pressure 126/58, pulse 73, temperature (!) 95.2  F (35.1  C), temperature  source Oral, resp. rate 18, weight 119.5 kg (263 lb 8 oz), SpO2 95 %.    Constitutional: Awake and alert, in no apparent distress. Resting in bed.   Eyes: Sclera clear, anicteric   Respiratory: Breathing non-labored. CTAB. Good air entry.   Cardiovascular:  RRR, normal S1/S2. No rubs or murmurs.  No peripheral edema.   GI: Soft, non-tender, non-distended. Normoactive bowel sounds.   Skin:  Good color. No jaundice. No visible rashes, lesions, or bruising of concern.   Neurologic: Alert and fully oriented. No focal deficits.         Lines/Tubes/Drains:      Incision/Surgical Site 11/22/22 Left Hip (Active)   Incision Assessment WDL 02/12/23 0441   Daria-Incision Assessment UTV 02/05/23 1600   Closure Approximated 02/12/23 0441   Incision Drainage Amount None 02/08/23 1824   Incision Care Ice applied 12/21/22 0300   Dressing Intervention Open to air / No Dressing 02/12/23 0441   Number of days: 82

## 2023-02-13 NOTE — CARE PLAN
Pt is alert and oriented x4, denies fever, chills, chest pain , SOB,N/V, abdominal pain. Pt incontinent of bowel, uses urinal at bedside. Pt slept well throughout the shift. Safety rounds completed. No lines, tubes, or drains. Nursing will continue with poc.      Patient's most recent vital signs are:     Vital signs:  BP: 130/75  Temp: 95.3  HR: 77  RR: 18  SpO2: 96 %     Patient does not have new respiratory symptoms.  Patient does not have new sore throat.  Patient does not have a fever greater than 99.5.

## 2023-02-13 NOTE — PROGRESS NOTES
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 Hospital course c/b toxic encephalopathy, acute hypoxic respiratory failure d/t OHS, acute blood loss anemia, pre-renal HALEY, and profound physical deconditioning. Transferred to TCU on 12/24/22 for rehabilitation. He is no longer progressing with therapies due to both pain and lack of motivation. He is currently awaiting placement at a long term SNF.     Chronic left hip PJI s/p explantation, debridement, and reconstruction with antibiotic spacer (11/22/22): With Dr. Meyers. Surgical cultures grew Finegoldia magna and Proteus mirabilis. Completed 6 wk course of oral Cipro and Flagyl on 1/3. Remains clinically stable.  Saw Dr. Meyers on 1/9 who recommends 8 wk antibiotic holiday prior to re-eval for re-implantation surgery.  No overt signs of infection. Last CRP 2/13 is flat.    - NWB to LLE continues   - Tylenol 1000 mg TID PRN   - Following 8 week antibiotic holiday will need left hip aspiration with the following labs: cell count, differential, alpha defense and synovasure, aerobic/anaerobic/fungal cultures. If this infectious work-up is negative then would proceed with second stage reimplantation surgery.   - Follow-up with Orthopedic Surgery after left hip aspiration (not yet scheduled)     Acute neck pain: Intermittent throughout TCU stay. Suspect muscle strain secondary to abnormal  positioning in bed. Currently stable but exacerbates with repositioning.    - Tylenol, Robaxin, heat PRN     HALEY, resolved: Cr baseline 0.9-1.1. Creatinine bumped to 1.33 on 1/25 in setting of poor PO intake. Suspect volume depletion. Renal function improved w/ holding diuretics and IV hydration. Cr 0.9 on 2/15.   - BMP qMTh     DM2: Well-controlled. A1C 6.8% in November. PTA on glipizide, remains on hold d/t good BG control.  Recent Labs   Lab 02/15/23  0919 02/15/23  0830 02/14/23  0932 02/13/23  1827 02/13/23  0955 02/13/23  0817   GLC 83 94 84 145* 112* 115*      - if glucose downtrending, consider decrease Actos otherwise regiment to continue:  - Metformin 2000 mg daily   - Actos 45 mg daily   - Victoza 1.8 mg daily (substitute for PTA Trulicity)   - Continue to hold glipizide, can likely discontinue at discharge   - BG checks BID     Venous insufficiency: With chronic lower extremity edema - edema currently resolved.    - Continue to monitor   - Lasix held d/t HALEY, could consider resuming if edema recurs     Peripheral neuropathy: Pt reports distal tingling of R fingers 2-4 since surgery 11/2022. No progression or changes since. CMS intact. Possible neuropathy from surgical positioning vs prolonged FQ use vs diabetes.  Continue to manage diabetes  - supportive mng   - Follow up with PCP      Non-severe malnutrition: Patient was counseled by Dr. Meyers to focus on weight loss in effort to reduce morbidity of upcoming second stage reimplantation revision hip surgery.   - RD following     Intertrigo of L groin: Continue miconazole powder and keeping area dry      Physical deconditioning: Completed PT/OT.      Other Stable Medical Problems:   Hx of DVT/PE: Continue PTA apixaban 5 mg BID.  JAIR: Continue home CPAP.  HLD: Continue PTA Lipitor 40 mg daily.      Diet: Regular Diet Adult  Snacks/Supplements Adult: Other; Please allow pt/RN to order snacks/supplements PRN; Between Meals    DVT Prophylaxis: DOAC  Tran  Catheter: Not present  Lines: None     Cardiac Monitoring: None  Code Status: Full Code      Clinically Significant Risk Factors                       # DMII: A1C = 6.8 % (Ref range: 0.0 - 5.6 %) within past 3 months    # Severe Malnutrition: based on nutrition assessment        Disposition Plan    pending LTAC     The patient's care was discussed with the Attending Physician, Dr. Zuluaga, Bedside Nurse, Patient and left VM for partner.    Muriel Mcmahon PA-C  Hospitalist Service  [facility]  Securely message with [employer] (more info)  Text page via [facility] Paging/Directory   ______________________________________________________________________    Interval History   Today Don is feeling about the same.  He continues to struggle with being able to get out of bed and reposition and was able to move to his right side overnight. He notes that the configuation of his room limits his ability to use his laptop.  He notes that he continues to speak with friends and with his partner, Claire and would not like to speak to adjunctive services at this time but continues to enjoy talking to volunteers.     His pain in the left hip is absent when he is not moving.  He does not endorse any other pains.     Denies chest pain, N/V, F/C, SOB, palpitations, new rashes, new swelling or any other new symptoms.     Physical Exam   Vital Signs: Temp: (!) 95.3  F (35.2  C) Temp src: Oral BP: 130/75 Pulse: 77   Resp: 18 SpO2: 96 % O2 Device: None (Room air)    Weight: 263 lbs 8 oz  GENERAL: Alert and oriented x 3. NAD. Laying supine in bed. Cooperative.   HEENT: Anicteric sclera. NC. AT.pupils equal and round  CV: RRR. S1, S2. No murmurs appreciated.   RESPIRATORY: Effort normal on RA. Lungs CTAB with no wheezing, rales, rhonchi.   GI: Abdomen soft, NT, NABS   NEUROLOGICAL: No focal deficits. Moves all extremities.  EXTREMITIES: No peripheral edema. Intact bilateral pedal pulses.   SKIN: No jaundice. No rashes on  exposed skin      Medical Decision Making       40 MINUTES SPENT BY ME on the date of service doing chart review, history, exam, documentation & further activities per the note.      Data     I have personally reviewed the following data over the past 24 hrs:    7.9  \   10.3 (L)   / 303     138 105 30.7 (H) /  83   4.1 23 0.90 \

## 2023-02-13 NOTE — PLAN OF CARE
Goal Outcome Evaluation:  Foam dressings applied in AM to sacrum /coccyx and in other reddened/excoriated areas per report. Per patient, not helping a lot with the pain but it might be later. No recent changes. VSS , denies chest pain, no SOB, on CPAP ( own machine ) at bedtime. Declining repositioning/turning in bed. Good appetite, had takeaway food for supper. To continue POC.     B    Patient's most recent vital signs are:     Vital signs:  BP: 130/75  Temp: 95.3  HR: 77  RR: 18  SpO2: 96 %     Patient does not have new respiratory symptoms.  Patient does not have new sore throat.  Patient does not have a fever greater than 99.5.

## 2023-02-13 NOTE — PROGRESS NOTES
SW received a vm from [facility].  Referral is a no referral is too complex.     NEREYDA Sharp   [facility], Transitional Care Unit   Social Work   [address] [place] [address], 4th [place] [address]  () [phone]

## 2023-02-14 NOTE — TELEPHONE ENCOUNTER
[facility] Call Center    Phone Message    May a detailed message be left on voicemail: no     Reason for Call: Other: Ruthie with Dr. Meyers's office calling to get this patient, Don scheduled:  Due to IR REFERRAL: [id number] - XR JOINT ASPIRATION MAJOR LEFT & T84.50XA (ICD-10-CM) - Prosthetic joint infection, initial encounter (H).  Questions- Call Ruthie @ [phone]     Action Taken: Message routed to:  [facility] Vascular/ IR    Travel Screening: Not Applicable

## 2023-02-14 NOTE — PLAN OF CARE
0700H-2300H    Goal Outcome Evaluation:  No recent changes. VSS , denies chest pain, no SOB, on CPAP ( own machine ) at bedtime. Declining repositioning/turning in bed.. Good appetite, had takeaway food for supper. To continue POC.    B    Patient's most recent vital signs are:     Vital signs:  BP: 133/63  Temp: 95.8  HR: 74  RR: 18  SpO2: 94 %     Patient does not have new respiratory symptoms.  Patient does not have new sore throat.  Patient does not have a fever greater than 99.5.

## 2023-02-14 NOTE — TELEPHONE ENCOUNTER
Called and spoke with Ruthie. Informed her that per procedure team, this was done through [facility] radiology and scheduled through imaging line. SHe noted she was previously informed by Katie to place the order and schedule through IR. She will attempt to schedule through imaging and if there are any issues she will contact clinic/writer back.    Mariana Muir LPN

## 2023-02-14 NOTE — PLAN OF CARE
Pt declined COVID booster. Bedside RN provided education regarding the benefits and risks and potential side effects associated with the COVID-19 vaccine. Patient was informed that should he change his mind to let staff know of interest regarding vaccine.

## 2023-02-14 NOTE — CONFIDENTIAL NOTE
- Reached out to IR department about order placed on 1/6 that had not been scheduled yet. I said I called last Wednesay and they said they would call but nothing was charted and no appointment was scheduled.     -Verfied order was placed correctly. High priority message sent to schedule joint aspiration the week of 3/6. Will follow up to ensure it has been scheduled.

## 2023-02-14 NOTE — PLAN OF CARE
Goal Outcome Evaluation:     Plan of Care Reviewed With: patient    Overall Patient Progress: no change    Pt has no c/o pain or discomfort so far this shift. Refused offer of assistance with repositioning. Using urinal in bed, staff empties. Reminded to wear his CPAP but pt refused and claimed he is not wearing them tonight. Pt has no c/o SOB and no s/s of respiratory issue noted. Appear to be sleeping/resting between cares/ meds. Continue with plan of care.    Patient's most recent vital signs are:     Vital signs:  BP: 133/63  Temp: 95.8  HR: 74  RR: 18  SpO2: 94 %     Patient does not have new respiratory symptoms.  Patient does not have new sore throat.  Patient does not have a fever greater than 99.5.

## 2023-02-14 NOTE — CONFIDENTIAL NOTE
- A call was placed to the patient. Patient did not answer phone so a voicemail was left.     - Call back number to clinic was given and patient was told to call back and ask for Dr. Luigi Hendricks's nurse to discuss join aspiration for week of 3/6.

## 2023-02-14 NOTE — PLAN OF CARE
Problem: Skin Injury Risk Increased  Goal: Skin Health and Integrity  Description: Perform a full pressure injury risk assessment, as indicated by screening, upon admission to care unit.    Reassess skin (full inspection and injury risk, including skin temperature, consistency and color) frequently (e.g., scheduled interval, with change in condition) to provide optimal early detection and prevention.    Maintain adequate tissue perfusion (e.g., encourage fluid balance; avoid crossing legs, constrictive clothing or devices) to promote tissue oxygenation.    Maintain head of bed at lowest degree of elevation tolerated, considering medical condition and other restrictions. Use positioning supports to prevent sliding and friction. Consider low friction textiles    Outcome: Not Progressing  Intervention: Optimize Skin Protection  Recent Flowsheet Documentation  Taken 2/14/2023 1118 by Jewell Lynn, RN  Activity Management: activity adjusted per tolerance  Head of Bed (HOB) Positioning: HOB at 20-30 degrees   Goal Outcome Evaluation:  [id number]  Patient refused to be repositioned. Educated on frequent repositioning/ weight shifting by pillows can help to further injury/pressure injury. Educated also on eating / to increase intake of protein for wound healing .Trying to push more fluids. Uses urinal .Ate breakfast and drink his Ensure. Patient refused to be on his recliner but did allow staff to alternately putting  pillows on his hips/side.Refused for buttocks to be checked for now stating it is not burning/hurting for now it is only itchy. Call light with in reach. Continue with current plan of care.    [id number]  Patient ordered food from outside.Ate with good appetite. Trying to push fluids to patient. Continuously alternately off loading patient buttocks.Can call appropriately. Continue with current plan of care.    Blood pressure 121/71, pulse 68, temperature (!) 95.9  F (35.5  C), temperature source Oral, resp.  rate 16, weight 119.5 kg (263 lb 8 oz), SpO2 94 %.

## 2023-02-14 NOTE — PROGRESS NOTES
ZEB received VM from Whitney at [facility] reporting that she received referral for pt but that they are a memory care only facility and it did not appear pt would be appropriate. Whitney reported that they do have memory care openings currently and required two years private pay before accepting EW. Whitney left call back phone number of [phone].    ZEB also received a VM from Yaz with [facility] following up on referral sent for pt. Yaz reported that they currently do not have any EW/CADI availability but could add SW to their availability email list. Yaz left call back phone number of [phone].     Justina Lutz, JO ANN  [facility]   [facility]

## 2023-02-14 NOTE — TELEPHONE ENCOUNTER
[facility]    Phone Message    May a detailed message be left on voicemail: yes     Reason for Call: Other: Patient was returning call to Ruthie working with Dr Meyers     Action Taken: Message routed to:  [facility] karan    Travel Screening: Not Applicable

## 2023-02-14 NOTE — PROGRESS NOTES
ZEB received a vm from [facility] - they are memory care only but they suggested [facility] is an [facility] with beds available. ZEB sent referrals to both [facility] and [facility].    NEREYDA Shaffer  Post Acute Float   ARU/TCU/JENNYFER    Phone: [phone]  Fax: [phone]  Pager: [phone]

## 2023-02-15 NOTE — PLAN OF CARE
Goal Outcome Evaluation:      Plan of Care Reviewed With: patient    Overall Patient Progress: improving    Outcome Evaluation: Patient continues with 1-2 meals from hosptial and 1 from outside daily. Would be able to drink ensure at least every other day, encouraged ordering supplements PRN as well.

## 2023-02-15 NOTE — CONFIDENTIAL NOTE
- A call was placed to the patient.     - Patient said he did not know how transportation was arranged but was arranged for his appointment with Dr. Meyers.     - Will find out how to arrange transportation and schedule appointment for aspiration.     - Patient verbalized understanding of plan and all questions were answered. Call back number to clinic was given and patient was told to call if they had an further questions.

## 2023-02-15 NOTE — PLAN OF CARE
Goal Outcome Evaluation:     Blood pressure 137/72, pulse 72, temperature (!) 96.2  F (35.7  C), temperature source Oral, resp. rate 16, weight 119.5 kg (263 lb 8 oz), SpO2 94 %.              Patient compliant with shifting weights with pillows on his side .Trying to push more fluids. Uses urinal .Ate breakfast.Refused to be on his recliner during lunch. Call light with in reach. Continue with current plan of care.    BG 83

## 2023-02-15 NOTE — PROGRESS NOTES
CLINICAL NUTRITION SERVICES - REASSESSMENT NOTE     Nutrition Prescription     RECOMMENDATIONS FOR MDs/PROVIDERS TO ORDER:  Please obtain new weight    Malnutrition Status:    Severe malnutrition in the context of acute on chronic illness.    Recommendations already ordered by Registered Dietitian (RD):  Encouraged PRN snacks/supplements   Ensure Max every other day    Future/Additional Recommendations:  Monitor labs, intakes, and weight trends.      EVALUATION OF THE PROGRESS TOWARD GOALS   Diet: Regular  Intake/Tolerance: mostly % of documented meals, 1-2 meals documented daily.     Pt ordering (on average) 830 kcal and 23 g protein per day per [employer]. Also has food from outside.       NEW FINDINGS/REVIEW OF SYSTEMS    Nutrition/GI: Patient states he continues with ~ 2 meals daily, one from outside usually. Tried to drink Ensure today but it had likely been open previously and was congealed when he tried to drink it. Encouraged pt to order Ensure as needed. Provided with list of all snacks/supplements, patient agreeable. No other questions or concerns.      Weights: Pt seems weight stable prior to admission, down 60 lb (19%) in ~1.5 months. Pt seems weight stable since admission to TCU. No new weight since 1/30  Wt Readings from Last Encounters:   01/30/23 119.5 kg (263 lb 8 oz)   01/15/23 117.5 kg (259 lb)   01/12/23 119 kg (262 lb 6.4 oz)   01/09/22 119.7 kg (264 lb)   01/06/22 119.9 kg (264 lb 5.3 oz)   12/30/22 118.5 kg (261 lb 3.2 oz)   11/22/22 147.1 kg (324 lb 4.8 oz)   11/14/22 142.9 kg (315 lb)   11/09/22 146.8 kg (323 lb 9.6 oz)   05/19/22 136.1 kg (300 lb)   07/03/18 148.9 kg (328 lb 4.8 oz)     Labs reviewed     Medications reviewed: culturell, metformin, pioglitazone  IV Fluids?: No    MALNUTRITION   % Intake: </=75% for >/= 1 month (severe)   % Weight Loss: > 7.5% in 3 months (severe)   Subcutaneous Fat Loss: Upper arm:  Moderate  Muscle Loss: Temporal:  Mild, Upper arm (bicep, tricep):   Severe and Upper leg (quadricep, hamstring):  Moderate  Fluid Accumulation/Edema: Trace-mild   Malnutrition Diagnosis: Severe malnutrition in the context of acute on chronic illness    Previous Goals   Patient to consume % of nutritionally adequate meal trays TID, or the equivalent with supplements/snacks.  Evaluation: Unable to evaluate    Previous Nutrition Diagnosis  Inadequate oral intake related to multifactorial (altered GI function, decreased appetite, dislike of food) as evidenced by previous poor appetite/intakes, evident muscle/fat wasting.   Evaluation: No change    CURRENT NUTRITION DIAGNOSIS  Inadequate oral intake related to multifactorial (altered GI function, decreased appetite, dislike of food) as evidenced by previous poor appetite/intakes, evident muscle/fat wasting.     INTERVENTIONS  Implementation  PRN snacks/supplements    Goals  Patient to consume % of nutritionally adequate meal trays TID, or the equivalent with supplements/snacks.    Monitoring/Evaluation  Progress toward goals will be monitored and evaluated per protocol.    Dee Alanis MS, RDN, LDN  RD pager: [phone]  [facility] Weekend/Holiday Pager: [phone]

## 2023-02-15 NOTE — PLAN OF CARE
Goal Outcome Evaluation:     Plan of Care Reviewed With: patient    Overall Patient Progress: no change    Pt awake at midnight so RN brought up repositioning throughout the night between his R side and back. Recognized his cooperation with repositioning per PM shift and encouraged him to continue doing so overnight. Pt agreed and claimed he would call staff once he is ready to call it a night. Pt did not call at all and was found already sleeping when rounded at 0200. Pt cooperative when woken up to reposition. Reminded him his CPAP but refused to use it again tonight but will not provide any answer. Agreed to be checked for repositioning every 3H. Staying longer on his R side than his back. Refused to change position from his R side claiming he is comfortable. Continue to offer repositioning. Using urinal in bed, staff empties.  Pt has no c/o SOB and no s/s of respiratory issue noted at RA. Appear to be sleeping/resting between cares/ meds. Continue with plan of care.    Patient's most recent vital signs are:     Vital signs:  BP: 119/66  Temp: 95.9  HR: 78  RR: 16  SpO2: 94 %     Patient does not have new respiratory symptoms.  Patient does not have new sore throat.  Patient does not have a fever greater than 99.5.

## 2023-02-16 NOTE — PLAN OF CARE
Goal Outcome Evaluation:  Blood pressure 124/64, pulse 70, temperature (!) 95.5  F (35.3  C), temperature source Oral, resp. rate 18, weight 119.5 kg (263 lb 8 oz), SpO2 94 %.    Patient is alert and oriented x 4.Ortho clinic confirmed of patient appointment for the aspiration on 3/7. Unwilling to be reposition and do weight shifting of pillows, stated his buttocks is okay he do not feel any burning sensation .On blood sugar check. Drink Ensure refused for breakfast and to sit on his chair.

## 2023-02-16 NOTE — PLAN OF CARE
Goal Outcome Evaluation:    Pt A&OX4, calm, pleasant, & cooperative with care. Denied CP, SOB, & n/v. Pt is A of 2 with liko lift for transfer; was not OOB this shift. Continent for BM & continent for bladder. Urinal @ the bedside. LBM on 02/14/23 per pt's report. Takes med whole with thin liquid. Pt slept well throughout the night. Able to make needs known & call light within reach. Will continue with plan of care.    Patient's most recent vital signs are:     Vital signs:  BP: 130/64  Temp: 97.9  HR: 78  RR: 16  SpO2: 97 %     Patient does not have new respiratory symptoms.  Patient does not have new sore throat.  Patient does not have a fever greater than 99.5.

## 2023-02-16 NOTE — PROGRESS NOTES
ZEB followed up on the following placement referrals that were still pending:    [facility]: Declined - unable to meet needs.  [facility]: Did not receive referral - SW emailed the referral to Gisselle ([email or web address]) with admissions.  [facility]: Declined - Memory care only.  [facility]. Received a call back - they have no beds available in their LTC or TCU facilities.  [facility]: Left VM. Received a call back, no beds available.  [facility]: declined. No bed, don't take U[employer], No TLC beds.   [facility] [place]- they don't have EW must have 2 years PP.   [facility]: No available beds. Has a site in [place], [place] that has open EW beds, but SW let facility know that is too far.    Pending:  [facility] - no beds available until 3/1.     ZEB sent new placement referrals on 2/16 to:  [facility]  [facility]    2/17/23:  [facility] in [place]: Received a response via Epic - no bed available.    ZEB received a call back from [facility] - they are memory care only but admissions encouraged SW to send referrals to their [place] and [facility] locations. SW sent referrals to both locations on 2/17.    NEREYDA Shaffer  Post Acute Float   ARU/TCU/LTACH    Phone: [phone]  Fax: [phone]  Pager: [phone]

## 2023-02-16 NOTE — CONFIDENTIAL NOTE
- Called TCU to let them know I has scheduled Don's aspiration for 2/7 @ 11.  Verify aspiration could not be done on [facility].     - Confirmed transportation would be arranged by TCU.     -Call back number to clinic given.

## 2023-02-17 NOTE — PROGRESS NOTES
SW met with pt.  Pt doesn't want to do any recreation  other than look at Internet on phone and watch tv.  Pt did sign EW forms.  Pt stated pt has a dr. appt on the 7th to take fluid out of hip area.  Then on the 20th dr appt will see if they can put another hip back in.     SW emailed them back to Fatou/EW.     NEREYDA Sharp   [facility], Transitional Care Unit   Social Work   [facility] [place] [address], [address]th [place] [address]  () [phone]

## 2023-02-17 NOTE — PLAN OF CARE
Goal Outcome Evaluation:    Pt A&OX4, calm, pleasant, & cooperative with care. Denied CP, SOB, & n/v. Pt is A of 2 with liko lift for transfer; was not OOB this shift. Continent for BM & continent for bladder. Urinal @ the bedside. LBM on 02/14/23 per pt's report. Takes med whole with thin liquid. Pt slept well throughout the night. Able to make needs known & call light within reach. Will continue with plan of care.    Patient's most recent vital signs are:     Vital signs:  BP: 127/54  Temp: 96.3  HR: 77  RR: 18  SpO2: 97 %     Patient does not have new respiratory symptoms.  Patient does not have new sore throat.  Patient does not have a fever greater than 99.5.

## 2023-02-17 NOTE — PLAN OF CARE
The patient is alert and oriented x 3. VSS. Able to make needs known. Denies HA, SOB, chest pain, dizziness or N/V. Patient declined turning and repositioning. Educated patient on pressure injury prevention and encourage to shift weight to ease pressure on his buttock. Continent of bowel and bladder. Bed pan for toileting and uses urinal by the bedside appropriately. Transfer with liko lift. Continue to monitor for comfort.        Patient's most recent vital signs are:     Vital signs:  BP: 125/60  Temp: 96  HR: 71  RR: 18  SpO2: 96 %     Patient does have new respiratory symptoms.  Patient does not have new sore throat.  Patient does not have a fever greater than 99.5.

## 2023-02-17 NOTE — CONFIDENTIAL NOTE
- A call was placed to the patient. Patient did not answer phone so a voicemail was left.     - I told him the aspiration has been schedule and a follow upw ith Dr. Rocha was scheduled for 2 weeks after.     -I told him to let us know if he leave the rehab and these appointments no longer work.     - Call back number to clinic was given and patient was told to call back.

## 2023-02-17 NOTE — PROGRESS NOTES
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 Hospital course c/b toxic encephalopathy, acute hypoxic respiratory failure d/t OHS, acute blood loss anemia, pre-renal HALEY, and profound physical deconditioning. Transferred to TCU on 12/24/22 for rehabilitation. He is no longer progressing with therapies due to both pain and lack of motivation. He is currently awaiting placement at a long term SNF.     Chronic left hip PJI s/p explantation, debridement, and reconstruction with antibiotic spacer (11/22/22): With Dr. Meyers. Surgical cultures grew Finegoldia magna and Proteus mirabilis. Completed 6 wk course of oral Cipro and Flagyl on 1/3. Remains clinically stable.  Saw Dr. Meyers on 1/9 who recommends 8 wk antibiotic holiday prior to re-eval for re-implantation surgery.  No overt signs of infection. Last CRP 2/13 is flat.    - NWB to LLE continues   - Tylenol 1000 mg TID PRN   - Following 8 week antibiotic holiday will need left hip aspiration with the following labs: cell count, differential, alpha defense and synovasure, aerobic/anaerobic/fungal cultures. If this infectious work-up is negative then would proceed with second stage reimplantation surgery.   - Follow-up with Orthopedic Surgery after left hip aspiration (not yet scheduled)     Acute neck pain: Ongoing now x 1/2 weeks. No imaging and patient notes is more c/w  muscle pain. Exam negative for point tenderness and no overt rigidity. Intermittent throughout TCU stay. Suspect muscle strain secondary to abnormal positioning in bed. Currently stable but exacerbates with repositioning.    - Tylenol, Robaxin, heat PRN  - added diclofenac QID (2/19)     HALEY, resolved: Cr baseline 0.9-1.1. Creatinine bumped to 1.33 on 1/25 in setting of poor PO intake. Suspect volume depletion. Renal function improved w/ holding diuretics and IV hydration. Cr 0.9 on 2/15.   - BMP qMTh     DM2: Well-controlled. A1C 6.8% in November. PTA on glipizide, remains on hold d/t good BG control.  Recent Labs   Lab 02/17/23  0844 02/17/23  0843 02/16/23  1901 02/16/23  1023 02/15/23  1705 02/15/23  0919   GLC 98 101* 117* 90 104* 83      - if glucose downtrending, consider decrease Actos otherwise regiment to continue:  - Metformin 2000 mg daily   - Actos 45 mg daily   - Victoza 1.8 mg daily (substitute for PTA Trulicity)   - Continue to hold glipizide, can likely discontinue at discharge   - BG checks BID     Venous insufficiency: With chronic lower extremity edema - edema currently resolved.    - Continue to monitor   - Lasix held d/t HALEY, could consider resuming if edema recurs     Peripheral neuropathy: Pt reports distal tingling of R fingers 2-4 since surgery 11/2022. No progression or changes since. CMS intact. Possible neuropathy from surgical positioning vs prolonged FQ use vs diabetes.  Continue to manage diabetes  - supportive mng   - Follow up with PCP      Non-severe malnutrition: Patient was counseled by Dr. Meyers to focus on weight loss in effort to reduce morbidity of upcoming second stage reimplantation revision hip surgery.   - RD following    Anemia of chronic disease: hgb stable in the 10-11s - monitor     Intertrigo of L groin: Continue miconazole powder and keeping area dry      Physical deconditioning: Completed PT/OT.      Other Stable Medical Problems:   Hx of DVT/PE: Continue  PTA apixaban 5 mg BID.  JAIR: Continue home CPAP.  HLD: Continue PTA Lipitor 40 mg daily.        Diet: Regular Diet Adult  Snacks/Supplements Adult: Other; Please allow pt/RN to order snacks/supplements PRN; Between Meals  Snacks/Supplements Adult: Ensure Max Protein (bariatric); Between Meals    DVT Prophylaxis: DOAC  Tran Catheter: Not present  Lines: None     Cardiac Monitoring: None  Code Status: Full Code      Clinically Significant Risk Factors                       # DMII: A1C = 6.8 % (Ref range: 0.0 - 5.6 %) within past 3 months    # Severe Malnutrition: based on nutrition assessment        Disposition Plan    pending LTAC placement     The patient's care was discussed with the Bedside Nurse and Patient.    Muriel Mcmahon PA-C  Hospitalist Service  [facility]  Securely message with [employer] (more info)  Text page via [facility] Paging/Directory   ______________________________________________________________________    Interval History    Don notes that he had a very rough night last night and states that when he was found to have diarrhea and stool incontinence nursing attempted to reposition him which ultimately was very painful for the left hip.  His repositioning required a Baudilio lift and a great deal of painful movements for his low back and extremities.  Pain is otherwise under better control when he is staying still.    Logistics was in his room limit his ability to access his belongings and this continues to be a challenge.     He continues to await placement.  He has not had any overt signs of infection including any fevers, chills, sweats, rashes, or lesions.  He does not endorse having any urinary complaints and is voiding ad rl.  He continues to tolerate p.o. food fluids and medications without any issues.      Physical Exam   Vital Signs: Temp: (!) 96  F (35.6  C) Temp src: Oral BP: 125/60 Pulse: 71   Resp: 18 SpO2: 96 % O2 Device: None (Room air)    Weight: 263 lbs 8  oz  GENERAL: Alert and oriented. NAD.  Able to sit upright with grasping the bed railing. Cooperative.   HEENT: + Limited ability to rotate head to look over her right shoulder, SCMs are somewhat tense, no nuchal rigidity or point tenderness over C-spine, no deformities noted.  Anicteric sclera.  NC. AT.  Pupils equal and round  CV: RRR. S1, S2. No murmurs appreciated.   RESPIRATORY: Effort normal on RA. Lungs CTAB with no wheezing, rales, rhonchi.   GI: Abdomen soft, NT, ND, NABS  NEUROLOGICAL: No focal deficits. Moves all extremities.    EXTREMITIES: No peripheral edema. Intact bilateral pedal pulses.   SKIN: No jaundice. No rashes.  BACK: no lesions notable on upper back      Medical Decision Making       45 MINUTES SPENT BY ME on the date of service doing chart review, history, exam, documentation & further activities per the note.      Data

## 2023-02-18 NOTE — PLAN OF CARE
Goal Outcome Evaluation:     Plan of Care Reviewed With: patient    Overall Patient Progress: no change    Pt awake at midnight so RN brought up repositioning throughout the night between his R side and back. Pt agreed and claimed he would call staff once he is ready to call it a night. Pt did not call at all and was found already sleeping when rounded at 0200. Pt cooperative when woken up to reposition. Continue to refuse to wear CPAP. Agreed to be checked for repositioning every 3H. Staying longer on his R side than his back.  Continue to offer repositioning. Using urinal in bed, staff empties.  Pt has no c/o SOB and no s/s of respiratory issue noted at RA. Appear to be sleeping/resting between cares/ meds. Continue with plan of care.    Patient's most recent vital signs are:     Vital signs:  BP: 132/74  Temp: 95.5  HR: 71  RR: 18  SpO2: 95 %     Patient does not have new respiratory symptoms.  Patient does not have new sore throat.  Patient does not have a fever greater than 99.5.

## 2023-02-18 NOTE — PLAN OF CARE
The patient is alert and oriented x 3. VSS. Able to make needs known. Denies HA, SOB, chest pain, dizziness or N/V. Medicated with Robaxin x 1 for pain control. Patient continue to decline turning and repositioning. Educated patient on pressure injury prevention and encourage to shift weight to ease pressure on his buttock. Continent of bowel and bladder. Bed pan for toileting and uses urinal by the bedside appropriately. Transfer with liko lift. Continue to monitor for comfort.        Patient's most recent vital signs are:     Vital signs:  BP: 139/67  Temp: 95.1  HR: 75  RR: 18  SpO2: 95 %     Patient does not have new respiratory symptoms.  Patient does not have new sore throat.  Patient does not have a fever greater than 99.5.

## 2023-02-18 NOTE — PLAN OF CARE
Goal Outcome Evaluation:    Patient is alert and oriented x 4. He is able to make his needs known. Pt denied SOB and chest pain. He also denied nausea and vomited. He is able to swallow without difficulty but prefers to take meds with apple sauce. Assessed his buttocks and changed the Mepilex on his buttocks.     Patient's most recent vital signs are:     Vital signs:  BP: 132/74  Temp: 95.5  HR: 71  RR: 18  SpO2: 95 %     Patient does not have new respiratory symptoms.  Patient does not have new sore throat.  Patient does not have a fever greater than 99.5.

## 2023-02-19 NOTE — PLAN OF CARE
Goal Outcome Evaluation:    Patient is alert and oriented x 4. He is able to make his needs known. Pt denied SOB and chest pain. He also denied nausea and vomited. He is able to swallow without difficulty. Assessed his buttocks and dressing is clean, dry and intact.        Patient's most recent vital signs are:     Vital signs:  BP: 121/61  Temp: 96.1  HR: 78  RR: 18  SpO2: 94 %     Patient does not have new respiratory symptoms.  Patient does not have new sore throat.  Patient does not have a fever greater than 99.5.

## 2023-02-19 NOTE — PLAN OF CARE
The patient is alert and oriented x 3. VSS. Able to make needs known. Denies HA, SOB, chest pain, dizziness or N/V. Medicated with Robaxin for pain control. Patient continue to decline turning and repositioning. Educated patient on pressure injury prevention and encourage to shift weight to ease pressure on his buttock. Continent of bowel and bladder. Bed pan for toileting and uses urinal by the bedside appropriately. Transfer with liko lift. Continue to monitor for comfort.        Patient's most recent vital signs are:     Vital signs:  BP: 114/65  Temp: 97.1  HR: 72  RR: 18  SpO2: 95 %     Patient does not have new respiratory symptoms.  Patient does not have new sore throat.  Patient does not have a fever greater than 99.5.

## 2023-02-19 NOTE — PLAN OF CARE
Goal Outcome Evaluation:       Pt had no complain of discomfort this shift. Slept through out the night. Pt requires assistance of 2 with cares. Refuses repositioning. Pt uses a urinal at bed side. Staffs empties. No complain at this time. Will continue with POC.      Patient's most recent vital signs are:     Vital signs:  BP: 121/61  Temp: 96.1  HR: 78  RR: 18  SpO2: 94 %     Patient does not have new respiratory symptoms.  Patient does not have new sore throat.  Patient does not have a fever greater than 99.5.

## 2023-02-20 NOTE — PLAN OF CARE
VS: Blood pressure 128/69, pulse 72, temperature (!) 96.3  F (35.7  C), temperature source Oral, resp. rate 18, weight 119.5 kg (263 lb 8 oz), SpO2 97 %.     O2: 97% on RA.    Output: Uses bedside urinal.    Last BM: 02/19/23   Activity: A2 with lyko lift.   Skin: Pt refused complete skin assessment. L surgical incision Open to Air.    Pain: Denied.   CMS: AOX4. Denied CP, SOB. Pt has tingling in R hand, middle 3 fingers.    Dressing: Mepilex on bottom. Writer unable to assess.    Diet: Regular diet.    Plan: Continue POC.

## 2023-02-20 NOTE — PLAN OF CARE
Goal Outcome Evaluation:       Pt is alert and oriented x 4. Able to make needs known to staffs. Continue to decline repositioning this shift. Pt is continent of both bowel and bladder. Uses urinal for bladder elimination. Pt transfers with the use of Liko lift. No complain of discomfort. Will continue with POC.      Patient's most recent vital signs are:     Vital signs:  BP: 141/63  Temp: 95.9  HR: 72  RR: 18  SpO2: 97 %     Patient does not have new respiratory symptoms.  Patient does not have new sore throat.  Patient does not have a fever greater than 99.5.

## 2023-02-21 NOTE — PLAN OF CARE
Goal Outcome Evaluation:       Pt is alert and oriented x 4. Able to make needs known to staffs. Pt is continent of both bowel and bladder. Uses urinal at bedside for urinary elimination. Transfers with the use of lift. Pt takes all medication whole with water. Continue to refuse repositioning this shift. All safety measures in place this shift. Will continue with POC.      Patient's most recent vital signs are:     Vital signs:  BP: 122/63  Temp: 96.2  HR: 76  RR: 18  SpO2: 94 %     Patient does not have new respiratory symptoms.  Patient does not have new sore throat.  Patient does not have a fever greater than 99.5.

## 2023-02-21 NOTE — PLAN OF CARE
The patient is alert and oriented x 3. VSS. Able to make needs known. Denies HA, SOB, chest pain, dizziness or N/V. Medicated with Tylenol and  Robaxin for pain control. Patient continue to decline turning and repositioning. Educated patient on pressure injury prevention and encourage to shift weight to ease pressure on his buttock. Continent of bowel and bladder. Bed pan for toileting and uses urinal by the bedside appropriately. Transfer with liko lift. Continue to monitor for comfort        Patient's most recent vital signs are:     Vital signs:  BP: 129/68  Temp: 98.2  HR: 78  RR: 18  SpO2: 94 %     Patient does not have new respiratory symptoms.  Patient does not have new sore throat.  Patient does not have a fever greater than 99.5.

## 2023-02-22 NOTE — PLAN OF CARE
Patient is alert and oriented x4. Makes needs known. Patient is continent of both bowel and bladder. Urinal within reach at all times. Liko lift for transfers. Up to chair at beginning at shift. Denied SOB, chest pain, and N&V. T-pump heat therapy to back of neck at bedtime. Will continue with POC.    Patient's most recent vital signs are:     Vital signs:  BP: 122/55  Temp: 96.1  HR: 81  RR: 16  SpO2: 94 %     Patient does not have new respiratory symptoms.  Patient does not have new sore throat.  Patient does not have a fever greater than 99.5.

## 2023-02-22 NOTE — PROGRESS NOTES
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 Hospital course c/b toxic encephalopathy, acute hypoxic respiratory failure d/t OHS, acute blood loss anemia, pre-renal HALEY, and profound physical deconditioning. Transferred to TCU on 12/24/22 for rehabilitation. He is no longer progressing with therapies due to both pain and lack of motivation. He is currently awaiting placement at a long term SNF.     Chronic left hip PJI s/p explantation, debridement, and reconstruction with antibiotic spacer (11/22/22)  Patient presented to [facility] 11/22 for scheduled explantation of left hip prosthesis, debridement, and reconstruction with antibiotic cement spacer with Dr. Meyers.  Surgical cultures grew Finegoldia magna and Proteus mirabilis.  Completed 6-week course of oral Cipro and Flagyl on 1/3.  Patient is currently on an 8-week antibiotic holiday prior to reeval for reimplantation surgery.  CRP stable.  -NWB to LLE  -Pain control with Tylenol 1000 mg 3 times daily as needed  -left hip aspiration 3/7/23 with the following labs: Cell count, differential, alpha defense and synovasure, aerobic/anaerobic fungal cultures.  If infectious work-up negative, then would proceed with second stage reimplantation surgery  -Radiology appointment 3/7/23    Nerve impingement  ?Carpal tunnel  Patient complains of right hand finger numbness.  Patient is right-handed.  Numbness has been going on for weeks.  Good range of motion.  Discussed brace to wear on right hand at night, at this point patient is refusing.  -Neurology consult at discharge  "for nerve testing    Diabetes mellitus type 2 (A1c 6.8% 11/2022)  Well controlled.  PTA glipizide has been discontinued with good blood sugar control  -Continue metformin 2000 mg daily  -Continue Actos 45 mg daily  -Continue Victoza 1.8 mg daily (substituted for PTA Trulicity)  -Blood glucose checks as needed  -recheck Hb A1c in May at primary care if discharged   Recent Labs   Lab 02/22/23  0614 02/21/23  1605 02/21/23  0933 02/20/23  1631 02/20/23  1035 02/20/23  0856   * 125* 115* 124* 108* 92       Venous insufficiency  Lower extremity edema absent.  Lasix had been held due to a HALEY  -Consider resumption of Lasix if edema recurs    Intertrigo-left groin  -Miconazole powder twice daily    DVT/PE  -Eliquis 5 mg twice daily indefinitely    JAIR  -Uses CPAP at night      Stable and Resolved Issues:  Anemia of chronic disease-hemoglobin stable in the 10-11 range.  CBC on Mondays  Physical deconditioning-completed PT and OT  Peripheral neuropathy-distal tingling of right fingers 2-4 since surgery, no progression noted  Neck pain-continue Tylenol, Robaxin, heat as needed.  Diclofenac gel 2 g 4 times daily    Consulting Services: Ortho      CODE: Full code  DVT: DOAC  Diet: Regular Diet Adult  Snacks/Supplements Adult: Other; Please allow pt/RN to order snacks/supplements PRN; Between Meals  Snacks/Supplements Adult: Ensure Max Protein (bariatric); Between Meals    Disposition: TBD pending hip aspiration 3/7      Emperatriz Wright, CNP, APRN  Internal Medicine [facility] Hospitalist    [facility]  Pager [phone]    ________________________________________________________________    Subjective & Interval History:      Waldo Bustos is a 72-year-old female at [facility] for nursing care, medical management.  Patient declining position changes, getting up to chair.  Is on an air mattress.  States the reason he is declining is because staff is \"too rough and moves too fast\".  Offered different options of types of  slings " that we can use to reposition to make patient more comfortable.  Continues to complain of neck pain, encourage use of heat.  Encourage active range of motion while in bed.  Patient states he has a good appetite and is eating at least 2 meals a day with supplements in between.  Normal bowel movement, no issues with voiding.  Patient in transfer rested and resuming physical therapy.  Discussed that it would have to be improved through insurance.  Will likely resume after hip aspiration and surgery.  Denies shortness of breath, chest pain, dyspnea, lightheadedness, dizziness, numbness, tingling. Denies n/v/d, constipation, abdominal pain. Denies joint pain, swelling, increased warmth. Denies feelings of anxiety or depression.       Last 24 hour care team notes reviewed.   ROS: 4 point ROS (including Respiratory, CV, GI and ) was performed and negative unless otherwise noted in HPI.     Medications: Reviewed in EPIC.    Physical Exam:    Blood pressure 125/61, pulse 76, temperature (!) 95.6  F (35.3  C), temperature source Oral, resp. rate 18, weight 119.5 kg (263 lb 8 oz), SpO2 96 %.    GENERAL: Alert and oriented x 3. Well nourished, well developed.  No acute distress.    HEENT: Normocephalic, atraumatic. Anicteric sclera. Mucous membranes moist.   CV: RRR. S1, S2. No murmurs appreciated.   RESPIRATORY: Effort normal on RA. Lungs CTAB with no wheezing, rales, or rhonchi.   GI: Abdomen soft and non distended, bowel sounds present x all 4 quadrants. No tenderness, rebound, or guarding.   NEUROLOGICAL: No focal deficits. Follows commands.  Strength weak in upper and lower extremities.   MUSCULOSKELETAL: No joint swelling or tenderness. Moves all extremities.   EXTREMITIES: No gross deformities. No peripheral edema.   SKIN: Grossly warm, dry, and intact. No jaundice. No rashes.       Lines/Tubes/Drains:          ROUTINE IP LABS (Last four results)  CMP   Recent Labs   Lab 02/22/23  0614 02/21/23  1605 02/21/23  0933  02/20/23  1631 02/20/23  1035 02/20/23  0856 02/17/23  1748 02/17/23  0844     --   --   --   --  135*  --  138   POTASSIUM 4.2  --   --   --   --  4.6  --  4.0   CHLORIDE 105  --   --   --   --  104  --  105   CO2 23  --   --   --   --  19*  --  23   ANIONGAP 10  --   --   --   --  12  --  10   * 125* 115* 124*   < > 92   < > 98   BUN 36.4*  --   --   --   --  29.8*  --  31.1*   CR 0.98  --   --   --   --  0.87  --  0.89   MAIKOL 8.8  --   --   --   --  8.9  --  8.7*    < > = values in this interval not displayed.     CBC   Recent Labs   Lab 02/22/23  0614 02/20/23  0856 02/17/23  0844   WBC 9.8 8.4 9.0   RBC 3.86* 3.86* 3.72*   HGB 11.1* 11.2* 10.7*   HCT 35.1* 35.7* 33.6*   MCV 91 93 90   MCH 28.8 29.0 28.8   MCHC 31.6 31.4* 31.8   RDW 16.9* 16.9* 16.7*    278 300     INR No lab results found in last 7 days.    OTHER:  NA

## 2023-02-22 NOTE — PLAN OF CARE
Goal Outcome Evaluation:       Overall Pt had no acute issue this shift. Alert and oriented x 4. Able to make needs known to staffs. Pt Denied having chest pain, Sob, N/V, fever and chills. Pt continue to refuse repositioning. Pt transfers with the use of Liko lift. Continent of both bowel and bladder. No acute issue noted. Will continue with POC.       Patient's most recent vital signs are:     Vital signs:  BP: 125/61  Temp: 95.6  HR: 76  RR: 18  SpO2: 96 %     Patient does not have new respiratory symptoms.  Patient does not have new sore throat.  Patient does not have a fever greater than 99.5.

## 2023-02-22 NOTE — PROGRESS NOTES
Pt A&O x4. On RA. Pt did get up in chair today. Full linen change was done. Skin on back intact (could not check lower back/buttocks). Pt also stated that he would do PT if his insurance paid for it again. Pt ref Voltaren gel this am. Prefers the heating pad for stiff neck. LS clear, BS +. Does not eat breakfast in am, but will have lunch and dinner, as well as the chocolate supplement shakes. Offered Pt to see jag and he declined but said he does enjoy the volunteer that comes to visit him. Pt continues to be up in chair with laptop set up and is comfortable.     Alma Gomez RN on 2/22/2023 at 2:07 PM

## 2023-02-22 NOTE — PROGRESS NOTES
CLINICAL NUTRITION SERVICES - REASSESSMENT NOTE     Nutrition Prescription     RECOMMENDATIONS FOR MDs/PROVIDERS TO ORDER:  Please obtain new weight    Malnutrition Status:    Moderate malnutrition in the context of acute on chronic illness.    Recommendations already ordered by Registered Dietitian (RD):  Encouraged adequate intakes and use of supplements PRN  Continue current supplement order - modified flavor preference    Future/Additional Recommendations:  Monitor labs, intakes, and weight trends.      EVALUATION OF THE PROGRESS TOWARD GOALS   Diet: Regular  Intake/Tolerance: mostly 100% of meals, poorly documented      NEW FINDINGS/REVIEW OF SYSTEMS    Nutrition/GI: Patient states he is eating just fine and is okay with current supplements and will drink ensure every other day. No excess stock seen in room. Patient more concerned about his bed which moved without pt or writer pressing a button. Encouraged pt to order as many ensures and meals as necessary. Commended efforts to eat well.     Per RN, patient only likes chocolate ensure.      Weights: Pt seems weight stable prior to admission, down 60 lb (19%) in ~1.5 months. Pt seems weight stable since admission to TCU. No new weight since 1/30  Wt Readings from Last Encounters:   01/30/23 119.5 kg (263 lb 8 oz)   01/15/23 117.5 kg (259 lb)   01/12/23 119 kg (262 lb 6.4 oz)   01/09/22 119.7 kg (264 lb)   01/06/22 119.9 kg (264 lb 5.3 oz)   12/30/22 118.5 kg (261 lb 3.2 oz)   11/22/22 147.1 kg (324 lb 4.8 oz)   11/14/22 142.9 kg (315 lb)   11/09/22 146.8 kg (323 lb 9.6 oz)   05/19/22 136.1 kg (300 lb)   07/03/18 148.9 kg (328 lb 4.8 oz)     Labs reviewed     Medications reviewed: culturell, metformin, pioglitazone  IV Fluids?: No    MALNUTRITION   % Intake: Difficult to assess but likely does not meet criteria for malnutrition.   % Weight Loss: > 7.5% in 3 months (severe) - previously  Subcutaneous Fat Loss: Upper arm:  mild-moderate  Muscle Loss: Temporal:   Mild and Thoracic region (clavicle, acromium bone, deltoid, trapezius, pectoral):  Moderate  Fluid Accumulation/Edema: Trace-mild  Malnutrition Diagnosis: Moderate malnutrition in the context of acute illness/injury    Previous Goals   Patient to consume % of nutritionally adequate meal trays TID, or the equivalent with supplements/snacks.  Evaluation: Unable to evaluate    Previous Nutrition Diagnosis  Inadequate oral intake related to multifactorial (altered GI function, decreased appetite, dislike of food) as evidenced by previous poor appetite/intakes, evident muscle/fat wasting.   Evaluation: Improving    CURRENT NUTRITION DIAGNOSIS  Predicted inadequate nutrient intake (protein-energy) related to eating mostly 100% of meals documented the past week with good appetite but potential for PO intake decline.     INTERVENTIONS  Implementation  Encouraged oral intakes    Goals  Patient to consume % of nutritionally adequate meal trays TID, or the equivalent with supplements/snacks.    Monitoring/Evaluation  Progress toward goals will be monitored and evaluated per protocol.    Dee Alanis MS, RDN, LDN  RD pager: [phone]   Weekend/Holiday Pager: [phone]

## 2023-02-22 NOTE — PROGRESS NOTES
2/22  ZEB followed up on the following placement referrals that were still pending:     [facility]: Emailed Gisselle with admissions.  [facility]: Fax failed, refaxed to [phone].  [facility]: Left VM for admissions.  [facility]: Left VM for Darvin in admissions.  [facility]: Left VM for Simona in admissions ([phone])  [facility]: Left VM with admissions.    Declined  [facility] - no beds for a few weeks  [facility]: Declined - unable to meet needs.  [facility]: Currently operating at half staff and are unable to take any 2 person transfers.  [facility]: No beds in SNF and [facility] does not take EW.  [facility]: Left VM for Melani in admissions. Received call back - do not accept EW/MA.    Pending:  [facility] - no beds available until 3/1.      ZEB sent new placement referrals on 2/22 to:  [facility] (SW received a voicemail that they have openings) ZEB left a voicemail for Christine with admission ([phone])    Director of TCU escalated this pt's case to Billy to see if beds are available within their network.    NEREYDA Shaffer  Post Acute Float   ARU/TCU/LTACH     Phone: [phone]  Fax: [phone]  Pager: [phone]

## 2023-02-23 NOTE — PLAN OF CARE
Pt is alert and oriented x 4. Pt denied pain. Denies chest pain,SOB, and N/V. Able to make needs known. Pt refused Voltaren gel this morning. Pt refused breakfast this morning. Pt eat lunch with good appetite. Pt is in bed all shift, did not get up in chair. Call light with in reach. Will continue with plan of care.      [id number]: No acute changes in this shift.able to make needs known.Pt had BM. Unable to assess on his bottom skin. Pt is up in chair for few hours in this shift. Pt eat dinner with good appetite. Call light with in reach. Will continue with plan of care.  Patient's most recent vital signs are:     Vital signs:  BP: 128/59  Temp: 95.4  HR: 72  RR: 18  SpO2: 97 %     Patient does not have new respiratory symptoms.  Patient does not have new sore throat.  Patient does not have a fever greater than 99.5.

## 2023-02-23 NOTE — PHARMACY-MEDICATION REGIMEN REVIEW
Pharmacy Medication Regimen Review  Waldo Bustos is a 72 year old male who is currently in the Transitional Care Unit.    Assessment: All medications have an appropriate indications, durations and no unnecessary use was found    Plan:   Continue current treatment.  Attending provider will be sent this note for review.  If there are any emergent issues noted above, pharmacist will contact provider directly by phone.      Pharmacy will periodically review the resident's medication regimen for any PRN medications not administered in > 72 hours and discontinue them. The pharmacist will discuss gradual dose reductions of psychopharmacologic medications with interdisciplinary team on a regular basis.    Please contact pharmacy if the above does not answer specific medication questions/concerns.    Background:  A pharmacist has reviewed all medications and pertinent medical history today.  Medications were reviewed for appropriate use and any irregularities found are listed with recommendations.      Current Facility-Administered Medications:      acetaminophen (TYLENOL) tablet 1,000 mg, 1,000 mg, Oral, Q8H PRN, Jem Smith, DO, 1,000 mg at 02/21/23 1837     apixaban ANTICOAGULANT (ELIQUIS) tablet 5 mg, 5 mg, Oral, BID, Lang Blakely PA-C, 5 mg at 02/22/23 2005     atorvastatin (LIPITOR) tablet 40 mg, 40 mg, Oral, Daily, Mani Mcqueen MD, 40 mg at 02/22/23 0918     calcium carbonate (TUMS) chewable tablet 500 mg, 500 mg, Oral, TID PRN, Jem Smith DO, 500 mg at 12/31/22 1132     glucose gel 15-30 g, 15-30 g, Oral, Q15 Min PRN **OR** dextrose 50 % injection 25-50 mL, 25-50 mL, Intravenous, Q15 Min PRN **OR** glucagon injection 1 mg, 1 mg, Subcutaneous, Q15 Min PRN, Lang Blakely PA-C     diclofenac (VOLTAREN) 1 % topical gel 2 g, 2 g, Topical, 4x Daily, Muriel Mcmahon PA-C, 2 g at 02/21/23 0930     lactobacillus rhamnosus (GG) (CULTURELL) capsule 1 capsule, 1 capsule, Oral, BID, Lang Blakely  DIANA, 1 capsule at 02/22/23 2005     liraglutide (VICTOZA) injection 1.8 mg, 1.8 mg, Subcutaneous, Daily, Lang Blakely PA-C, 1.8 mg at 02/22/23 0921     metFORMIN (GLUCOPHAGE XR) 24 hr tablet 2,000 mg, 2,000 mg, Oral, QAM, Lang Blakely PA-C, 2,000 mg at 02/22/23 0918     methocarbamol (ROBAXIN) tablet 500 mg, 500 mg, Oral, 4x Daily PRN, Mani Mcqueen MD, 500 mg at 02/21/23 1837     miconazole (MICATIN) 2 % powder, , Topical, BID, Lang Blakely PA-C, Given at 02/22/23 2005     naloxone (NARCAN) injection 0.2 mg, 0.2 mg, Intravenous, Q2 Min PRN **OR** naloxone (NARCAN) injection 0.4 mg, 0.4 mg, Intravenous, Q2 Min PRN **OR** naloxone (NARCAN) injection 0.2 mg, 0.2 mg, Intramuscular, Q2 Min PRN **OR** naloxone (NARCAN) injection 0.4 mg, 0.4 mg, Intramuscular, Q2 Min PRN, Mani Mcqueen MD     Nurse may request from Pharmacy a change of form of medication (e.g. Liquid to tablet)., , Does not apply, Continuous PRN, Lang Blakely PA-C     ondansetron (ZOFRAN ODT) ODT tab 4 mg, 4 mg, Oral, Q6H PRN, Jem Smith DO, 4 mg at 12/31/22 0829     Patient is already receiving anticoagulation with heparin, enoxaparin (LOVENOX), warfarin (COUMADIN)  or other anticoagulant medication, , Does not apply, Continuous PRN, Lang Blakely PA-C     pioglitazone (ACTOS) tablet 45 mg, 45 mg, Oral, Daily, Lang Blakely PA-C, 45 mg at 02/22/23 0918     polyethylene glycol (MIRALAX) Packet 17 g, 17 g, Oral, Daily PRN, Beverly Johnson PA     senna-docusate (SENOKOT-S/PERICOLACE) 8.6-50 MG per tablet 2 tablet, 2 tablet, Oral, BID PRN, Beverly Johnson PA  No current outpatient prescriptions on file.   PMH: Chronic left hip PJI s/p explantation, debridement, and reconstruction with antibiotic spacer (11/22/22)- Completed 6-week course of oral Cipro and Flagyl on 1/3/23,  morbid obesity, DM2, HLD, JAIR, PE/DVT on chronic apixaban, venous insufficiency, OA, Neck pain, Anemia of chronic disease, and peripheral  neuropathy.

## 2023-02-23 NOTE — PLAN OF CARE
Goal Outcome Evaluation:  No acute issues overnight. Slept well during this shift, refused repositioning/skin assessment. Denied pain  and any discomfort. Tolerating room air, not using his own CPAP at night per patient.Sleeping, Comfortable at this time. To continue POC.

## 2023-02-23 NOTE — PROGRESS NOTES
ZEB received a call from [facility]. They never received the initial referral. ZEB refaxed the referral to [phone].    ZEB spoke with supervisor about case escalation. At this time, there are no open beds within [facility] long term care facility network. ZEB was recommended to try [facility]. ZEB sent a referral to [facility] and left a voicemail for the new [facility] ([phone]).     2/24:  ZEB received a call back from Christine with [facility] admissions. They are at capacity for EW beds.     NEREYDA Shaffer  Post Acute Float   [facility]    Phone: [phone]  Fax: [phone]  Pager: [phone]

## 2023-02-24 NOTE — PLAN OF CARE
Goal Outcome Evaluation:    Patient is AO, able to make his needs known. Patient was noted to be on his laptop computer upto after 1am. Denied CP, SOB, verbalized being sore to the neck and using T-pump. Patient did not want prn pain medication when offered, declined repositioning and verbalized doing it independently in bed. Uses urinal indepedently, LBM 2/23. No concerns tonight.      Patient's most recent vital signs are:     Vital signs:  BP: 117/63  Temp: 96.7  HR: 75  RR: 18  SpO2: 94 %     Patient does not have new respiratory symptoms.  Patient does not have new sore throat.  Patient does not have a fever greater than 99.5.

## 2023-02-24 NOTE — PROGRESS NOTES
[facility]  Internal Medicine Progress Note    TCU Day # 62    Assessment & Plan: Waldo Bustos is a 72 year old male with a history of morbid obesity, DM2, HLD, JAIR, PE/DVT on chronic apixaban, venous insufficiency, OA, and chronic L hip prosthetic joint infection who was admitted to [facility] 11/22/22 for scheduled explantation of left hip prosthesis, debridement, and reconstruction with antibiotic cement spacer. Hospital course c/b toxic encephalopathy, acute hypoxic respiratory failure d/t OHS, acute blood loss anemia, pre-renal HALEY, and profound physical deconditioning. Transferred to TCU on 12/24/22 for rehabilitation. He is no longer progressing with therapies due to both pain and lack of motivation. He is currently awaiting placement at a long term SNF.     Chronic left hip PJI s/p explantation, debridement, and reconstruction with antibiotic spacer (11/22/22)  Patient presented to [facility] 11/22 for scheduled explantation of left hip prosthesis, debridement, and reconstruction with antibiotic cement spacer with Dr. Meyers.  Surgical cultures grew Finegoldia magna and Proteus mirabilis.  Completed 6-week course of oral Cipro and Flagyl on 1/3.  Patient is currently on an 8-week antibiotic holiday prior to reeval for reimplantation surgery.  CRP stable.  -NWB to LLE  -Pain control with Tylenol 1000 mg 3 times daily as needed  -left hip aspiration 3/7/23 with the following labs: Cell count, differential, alpha defense and synovasure, aerobic/anaerobic fungal cultures.  If infectious work-up negative, then would proceed with second stage reimplantation surgery  -Radiology appointment 3/7/23     Nerve impingement  ?Carpal tunnel  Patient complains of right hand finger numbness.  Patient is right-handed.  Numbness has been going on for weeks.  Good range of motion.  Discussed brace to wear on right hand at night, at this point patient is refusing.  -Neurology consult at discharge for  nerve testing     Diabetes mellitus type 2 (A1c 6.8% 11/2022)  Well controlled.  PTA glipizide has been discontinued with good blood sugar control  -Continue metformin 2000 mg daily  -Continue Actos 45 mg daily  -Continue Victoza 1.8 mg daily (substituted for PTA Trulicity)  -Blood glucose checks as needed  -recheck Hb A1c in May at primary care   Recent Labs   Lab 02/25/23  1005 02/24/23  2238 02/24/23  1714 02/24/23  0833 02/23/23  1806 02/23/23  1346   GLC 98 111* 83 113* 120* 85          Venous insufficiency  Lower extremity edema absent.  Lasix had been held due to a HALEY  -Consider resumption of Lasix if edema recurs     Intertrigo-left groin  -Miconazole powder twice daily     DVT/PE  -Eliquis 5 mg twice daily indefinitely     JAIR  -Uses CPAP at night        Stable and Resolved Issues:  Anemia of chronic disease-hemoglobin stable in the 10-11 range.  CBC on Mondays  Physical deconditioning-completed PT and OT  Peripheral neuropathy-distal tingling of right fingers 2-4 since surgery, no progression noted  Neck pain-continue Tylenol, Robaxin, heat as needed.  Diclofenac gel 2 g 4 times daily       Consulting Services: Ortho      CODE: Full code  DVT: DOAC  Diet: Regular diet adult  Vaccinations:   Disposition: To be determined pending hip aspiration on 3/7      Emperatriz Wright, ALEENA, APRN  Internal Medicine [facility] Hospitalist  [facility]  Pager [phone]    ________________________________________________________________    Subjective & Interval History:      Waldo Bustos is a 72-year-old male at [facility] awaiting hip aspiration.  Patient states nothing has changed since last visit.  Has no new concerns or questions.  We did discuss that blood sugar checks are only as needed so he can ask staff to check if he feels like his blood sugar is high or low.  Pain is well controlled.  Denies shortness of breath, chest pain, dyspnea, lightheadedness, dizziness, numbness, tingling. Denies n/v/d, constipation,  abdominal pain. Denies joint pain, swelling, increased warmth. Denies feelings of anxiety or depression.     Last 24 hour care team notes reviewed.   ROS: 4 point ROS (including Respiratory, CV, GI and ) was performed and negative unless otherwise noted in HPI.     Medications: Reviewed in EPIC.    Physical Exam:    Blood pressure 137/65, pulse 74, temperature (!) 96.6  F (35.9  C), temperature source Oral, resp. rate 18, weight 119.5 kg (263 lb 8 oz), SpO2 94 %.    GENERAL: Alert and oriented x 3. Well nourished, well developed.  No acute distress.    HEENT: Normocephalic, atraumatic. Anicteric sclera. Mucous membranes moist.   CV: RRR. S1, S2. No murmurs appreciated.   RESPIRATORY: Effort normal on RA. Lungs CTAB with no wheezing, rales, or rhonchi.   GI: Abdomen soft and non distended, bowel sounds present x all 4 quadrants. No tenderness, rebound, or guarding.   NEUROLOGICAL: No focal deficits. Follows commands.  Strength weak in upper and lower extremities.   MUSCULOSKELETAL: No joint swelling or tenderness. Moves all extremities.   EXTREMITIES: No gross deformities. No peripheral edema.   SKIN: Grossly warm, dry, and intact. No jaundice. No rashes.       Lines/Tubes/Drains:          ROUTINE IP LABS (Last four results)  CMP   Recent Labs   Lab 02/24/23  0833 02/23/23  1806 02/23/23  1346 02/22/23  2135 02/22/23  0614 02/20/23  1035 02/20/23  0856     --   --   --  138  --  135*   POTASSIUM 4.4  --   --   --  4.2  --  4.6   CHLORIDE 105  --   --   --  105  --  104   CO2 24  --   --   --  23  --  19*   ANIONGAP 10  --   --   --  10  --  12   * 120* 85 94 104*   < > 92   BUN 38.7*  --   --   --  36.4*  --  29.8*   CR 1.11  --   --   --  0.98  --  0.87   MAIKOL 8.7*  --   --   --  8.8  --  8.9    < > = values in this interval not displayed.     CBC   Recent Labs   Lab 02/24/23  0833 02/22/23  0614 02/20/23  0856   WBC 10.9 9.8 8.4   RBC 3.73* 3.86* 3.86*   HGB 10.6* 11.1* 11.2*   HCT 34.1* 35.1* 35.7*    MCV 91 91 93   MCH 28.4 28.8 29.0   MCHC 31.1* 31.6 31.4*   RDW 17.0* 16.9* 16.9*    325 278     INR No lab results found in last 7 days.    OTHER:

## 2023-02-24 NOTE — PLAN OF CARE
Patient is alert and oriented x4, denies any pain this shift. Patient refused skin assessment and repositioning. Continent of bowel and bladder, last bowel movement 2/23/23. Transfers assist of 2 with liko lift. Patient is able to make needs known and uses the call light appropriately. Continue with the plan of care.    Patient's most recent vital signs are:    Vital signs:  BP: 137/65  Temp: 96.6  HR: 74  RR: 18  SpO2: 94 %    Patient does not have new respiratory symptoms.  Patient does not have new sore throat.  Patient does not have a fever greater than 99.5.

## 2023-02-25 NOTE — CARE PLAN
"RN: Denies needing voltaren or other prn meds.  \"I don't hurt if I don't move\" Pt declines to move to right side, stays on back. Continue to encourage turn and reposition and importance, and use pain meds to move smoother without as much pain. Left hip incision with edema, BRII, incision well approximated, no drainage. Pt makes needs known.  Refused skin check to caridad, sacral, back area. Pt states  has bowel movement daily and gets ceiling  Lifted up in air and staff will assess buttom at that time.  Had bm last pm XXL, ( I had my bm at 8:30 last evening ) with round balls noted in  Bed  Duarte marble size, looked like renny onions, meka 15 of them, questioned pt what he ate and nothing like this was mentioned, \"I had chipotle last pm\"    Patient's most recent vital signs are:     Vital signs:  BP: 133/61  Temp: 96  HR: 71  RR: 18  SpO2: 97 %     Patient does not have new respiratory symptoms.  Patient does not have new sore throat.  Patient does not have a fever greater than 99.5.         Bed pan with bm was in bed pan in pt bathroom from last  Pm until day shift today when staff emptied.   "

## 2023-02-25 NOTE — PLAN OF CARE
Goal Outcome Evaluation:    Pt A&OX4, calm, pleasant, & cooperative with care. Denied CP, SOB, & n/v. Pt is A of 2 with liko lift for transfer; was not OOB this shift. Continent for BM & continent for bladder. Urinal @ the bedside. LBM on 02/22/23 per pt's report. Takes med whole with thin liquid. Able to make needs known & call light within reach. Will continue with plan of care.    Patient's most recent vital signs are:     Vital signs:  BP: 133/62  Temp: 96.9  HR: 78  RR: 18  SpO2: 96 %     Patient does not have new respiratory symptoms.  Patient does not have new sore throat.  Patient does not have a fever greater than 99.5.

## 2023-02-25 NOTE — CARE PLAN
Patient is A &O x4. Able to make needs known. Appears  to be sleeping through the night. Denies SOB and CP.  Continent of B&B. Takes meds whole with thin liquid. Call light is within reach. POC.          Patient's most recent vital signs are:     Vital signs:  BP: 133/62  Temp: 96.9  HR: 78  RR: 18  SpO2: 96 %     Patient does not have new respiratory symptoms.  Patient does not have new sore throat.  Patient does not have a fever greater than 99.5.

## 2023-02-26 NOTE — CARE PLAN
RN: Denies pain (I only have pain when I move), pt declines to turn and reposition, stays on back,    Patient's most recent vital signs are:     Vital signs:  BP: 132/61  Temp: 96.4  HR: 73  RR: 18  SpO2: 94 %     Patient does not have new respiratory symptoms.  Patient does not have new sore throat.  Patient does not have a fever greater than 99.5.        makes needs known.  Call light in reach at all times and room checks every 1-2 hours. No attempts OOB unassisted.  Declined to get up into recliner. Ceiling lift transfer, NWB LLE. (incision well approximated, BRII, no drainage, edema remains left hip, no redness) Appetite fair. Had 1 meal this shift and ate 75%. Urinating in urinal and staff empties and records. Refuses skin check to back, bottom, coccyx,sacral area, states gets that area changed when he gets onto bed pan.  Pt is lifted in air and bed pan put under him and pm nurse last pm assessed, no new problems. No open areas was reported, see pm nurse NN also.  Pt denies use and need of voltaren cream and miconazole powder this shift. BG checked this morning as victoza and metformin are given.  this morning.

## 2023-02-26 NOTE — CARE PLAN
Patient had a XL BM x1,redness noted on bottom, barrier cream applied, refused offers to turned and repositioned. No c/o SOB, CP. Did not want to be bother. Call light left in patient's reach. Continue POC        Patient's most recent vital signs are:     Vital signs:  BP: 131/66  Temp: 96.8  HR: 77  RR: 18  SpO2: 96 %     Patient does not have new respiratory symptoms.  Patient does not have new sore throat.  Patient does not have a fever greater than 99.5.

## 2023-02-27 NOTE — PLAN OF CARE
0700H-2300H    Goal Outcome Evaluation:  No recent changes during this shift . Woke up late , skipped breakfast, ate lunch, all DM meds given at this time. Refused repositioning, unable to check back , patient usually  allows staff with incontinence cares/ BM episodes, calls staff as needed, no BM today ( last: 02/26), offered to check bottom, will call staff when he needs help for incontinence cares per patient. VSS, denies SOB,no complaint or request for pain meds, refusing Volatren cream also. Comfortable at this time. To continue POC.       Patient's most recent vital signs are:     Vital signs:  BP: 128/63  Temp: 96.8  HR: 76  RR: 16  SpO2: 96 %     Patient does not have new respiratory symptoms.  Patient does not have new sore throat.  Patient does not have a fever greater than 99.5.

## 2023-02-27 NOTE — CARE PLAN
Patient is A & O x4, continent of B&B using urina and bedpan, liko lift with transfer with an assist of 2. No acute change in patient's condition. Call light is with in reach. Continue poc.          Patient's most recent vital signs are:     Vital signs:  BP: 135/58  Temp: 96.9  HR: 71  RR: 16  SpO2: 97 %     Patient does not have new respiratory symptoms.  Patient does not have new sore throat.  Patient does not have a fever greater than 99.5.

## 2023-02-27 NOTE — PROGRESS NOTES
ZEB sent referral to [facility] in [place]. Fax# [phone]  [phone].  ZEB tried to call but Kaley's mailbox is full and just hung up.   ZEB Lou returned call.  She asked if pt was ever on [facility].  If pt was, he can go back on it.  They don't take EW.  ZEB will ask pt but I believe this is the first time pt has ever applied for MA.    NEREYDA Sharp   [facility], Transitional Care Unit   Social Work   [facility] [address] [place] [address]  (PH) [phone]

## 2023-02-27 NOTE — PLAN OF CARE
Goal Outcome Evaluation:    Pt is A&OX4, calm, pleasant, & cooperative with care. Denied CP, SOB, & n/v. Pt is A of 2 with liko lift for transfer; was not OOB this shift. Pt prefers not to be repositioned on the bed d/t L hip pain; back & sacral assessment is not done. Continent for both B&B; uses bedpan for BM. Urinal @ the bedside. LBM on 02/25/23 per pt's report. Takes med whole with thin liquid. Able to make needs known & call light within reach. Will continue with plan of care.    Patient's most recent vital signs are:     Vital signs:  BP: 135/58  Temp: 96.9  HR: 71  RR: 16  SpO2: 97 %     Patient does not have new respiratory symptoms.  Patient does not have new sore throat.  Patient does not have a fever greater than 99.5.

## 2023-02-28 NOTE — PLAN OF CARE
Patient is alert and oriented x4, denies any pain this shift. Patient refused skin assessment and repositioning. Continent of bowel and bladder. Transfers assist of 2 with liko lift. Patient is able to make needs known and uses the call light appropriately. Continue with the plan of care.    Patient's most recent vital signs are:    Vital signs:  BP: 128/63  Temp: 97.1  HR: 76  RR: 18  SpO2: 97 %    Patient does not have new respiratory symptoms.  Patient does not have new sore throat.  Patient does not have a fever greater than 99.5.

## 2023-02-28 NOTE — PLAN OF CARE
Goal Outcome Evaluation:    Plan of Care Reviewed With: patient    Overall Patient Progress: no change    Pt has no c/o pain or discomfort so far this shift. Refused offer of assistance with repositioning. Using urinal in bed, staff empties. Has not been using his CPAP when sleeping. Pt has no c/o SOB and no s/s of respiratory issue noted. Appear to be sleeping/resting between cares/ meds. Continue with plan of care.    Patient's most recent vital signs are:     Vital signs:  BP: 128/63  Temp: 96.8  HR: 76  RR: 16  SpO2: 96 %     Patient does not have new respiratory symptoms.  Patient does not have new sore throat.  Patient does not have a fever greater than 99.5.

## 2023-02-28 NOTE — PROGRESS NOTES
[facility]  Transitional Care Unit Progress Note  Waldo Bustos  [id number]  February 28, 2023         Assessment & Plan:     Waldo Bustos is a 72 year old male with a history of morbid obesity, DM2, HLD, JAIR, PE/DVT on chronic apixaban, venous insufficiency, OA, and chronic L hip prosthetic joint infection who was admitted to [facility] 11/22/22 for scheduled explantation of left hip prosthesis, debridement, and reconstruction with antibiotic cement spacer. Hospital course was complicated by a toxic encephalopathy, acute hypoxic respiratory failure due to obesity hypoventilation syndrome,  acute blood loss anemia,  HALEY, and profound physical deconditioning. He was transferred to TCU on 12/24/22 for rehabilitation. He is no longer progressing with therapies due to both pain and lack of motivation and unwillingness to move with therapy. He is currently awaiting placement at a long term SNF.    # Severe weakness and debility, no longer on therapies due to unwillingness to progress with PT/OT and pain  -PT/OT have stopped working with him after months of working on in bed therapies that he frequently refused to do exercises, work with staff on any goals or move in his bed to start making progress. After months of reattempting this, his PT/OT staff no longer are making reattempts. Mr. Bustos states that he wants PT and wants to get better, but is not interested in doing movement that causes pain. He is nervous about his upcoming Orthopedic appointment and the zero progress he has made while at [facility]. I offered PT to come back and visit with him again, and Mr. Bustos stated that that would not be helpful as he had already talked to them multiple times.     # Chronic left hip PJI s/p explantation, debridement, and reconstruction with antibiotic spacer (11/22/22)  Patient presented to [facility] 11/22 for scheduled explantation of left hip prosthesis, debridement, and reconstruction with  antibiotic cement spacer with Dr. Meyers.  Surgical cultures grew Finegoldia magna and Proteus mirabilis.  Completed 6-week course of oral Cipro and Flagyl on 1/3/23.  He is currently on an 8-week antibiotic holiday prior to reeval for reimplantation surgery.    - Continue to check weekly CRP  - NWB to LLE  - Pain control with Tylenol 1000 mg 3 times daily as needed  - IR consult placed today to get left hip aspiration scheduled for 3/6/23 (per Ortho recommendations) which will also need Cell count, differential, alpha defense and synovasure, aerobic/anaerobic fungal cultures.  If infectious work-up negative, then would proceed with second stage reimplantation surgery  - Orthopedic Surgery appointment with Dr. Meyers scheduled for 3/20/23     # Right hand pain, numbness and limited function  He has complained of right hand and finger numbness and pain for weeks and reports that it is getting worse. We o  Discussed brace to wear on right hand at night, at this point patient is refusing.  -Get x-ray of right hand  -Consider CT of right hand and/or Neurology consult tomorrow with consideration for EMG if needed with concern for nerve damage  -Brace was offered and patient refused     # Diabetes mellitus type 2 (A1c 6.8% 11/2022)  Well controlled.  PTA glipizide has been discontinued with good blood sugar control  -Continue metformin 2000 mg daily, Actos 45 mg daily, Victoza 1.8 mg daily (substituted for PTA Trulicity)  -Blood glucose checks as needed  -Recheck Hb A1c in May at primary care      # Intertrigo-left groin  -Miconazole powder twice daily     # Unprovoked DVT in L common femoral, deep and superficial femoral, popliteal, posterior tibial veins and PE (2018) on chronic anticoagulation  -Continue home regimen of Eliquis 5 mg twice daily indefinitely     # JAIR  -Uses CPAP at night      # Anemia of chronic disease  -Hemoglobin is 10.6g/dL and stable. Continue to monitor and transfuse to maintain Hgb of 7 or  greater    # Neck pain  - Continue Tylenol, Robaxin, heat as needed.  Diclofenac gel 2 g 4 times daily      Diet and/or tube feedings: regular   Lines, tubes, drains: peripheral IV present  DVT/GI prophylaxis: DOAC  Indications for psychotropic medications: He is not on any psychotropic medications  Code status: FULL CODE         Discharge Planning:   Awaiting placement at long term care facility as patient has not cooperated with even small amounts of therapy for months        Interval History:   Mr. Bustos was resting in bed, no acute distress, reporting how sad and frustrated he is that he has not made more progress with therapy while at TCU. We discussed how he has not made any progress with PT/OT because for months he would not participate in even short periods of therapy in bed despite weeks and months of reattempts by staff. We discussed how we are now looking for long term care for him (a discussion we have had on repeat for months) and that if he chooses to work with PT/OT at any point, we are happy to offer it, but at this time, he is not appropriate to stay at our TCU and can do PT at his long term care facility when he feels he is ready. I also discussed how dangerous not moving is to his body, his heart and overall health.         Physical Exam:   Vitals were reviewed  Blood pressure 131/61, pulse 77, temperature (!) 95.3  F (35.2  C), temperature source Oral, resp. rate 18, weight 119.5 kg (263 lb 8 oz), SpO2 96 %.  General:72  Year old morbidly obese gentleman resting in bed,no acute distress, denies pain at rest  HEENT: NCAT, anicteric sclera, EOMI, mucous membranes pink and moist  Cardiovascular: Regular rate and rhythm, no appreciable murmurs, rubs or gallops  Lungs: breathing comfortably on room air, no adventitious sounds to bilateral auscultation  Abdomen: obese abdomen, tinkering bowel sounds present, soft, non-tender to palpation throughout  Vascular: 1+ pretibial pitting edema  bilaterally  Skin: intertriginous erythema in groin region      Sasha Posada PA-C, MPH  Internal Medicine [facility] Hospitalist  [phone]

## 2023-03-01 NOTE — PLAN OF CARE
Goal Outcome Evaluation:     Plan of Care Reviewed With: patient    Overall Patient Progress: no change    Pt has no c/o pain or discomfort so far this shift. Refused offer of assistance with repositioning. Using urinal in bed, staff empties. Has not been using his CPAP when sleeping. Pt has no c/o SOB and no s/s of respiratory issue noted. Appear to be sleeping/resting between cares/ meds. Continue with plan of care.    Patient's most recent vital signs are:     Vital signs:  BP: 131/61  Temp: 95.3  HR: 77  RR: 18  SpO2: 96 %     Patient does not have new respiratory symptoms.  Patient does not have new sore throat.  Patient does not have a fever greater than 99.5.

## 2023-03-01 NOTE — PLAN OF CARE
Patient is alert and oriented x4, denies any pain this shift. Patient refused repositioning. Sacrum covered w/ mepilex. Buttocks is red, barrier cream applied. Continent of bowel and bladder. Transfers assist of 2 with liko lift. Patient not interested in getting into chair. Patient is able to make needs known and uses the call light appropriately. Continue with the plan of care.     Patient's most recent vital signs are:   /67 (BP Location: Right arm)   Pulse 77   Temp (!) 96.4  F (35.8  C) (Oral)   Resp 14   Wt 119.5 kg (263 lb 8 oz)   SpO2 96%   BMI 37.81 kg/m         Patient does not have new respiratory symptoms.  Patient does not have new sore throat.  Patient does not have a fever greater than 99.5.

## 2023-03-01 NOTE — PROGRESS NOTES
ZEB met with pt to see if pt ever had been on MA or CADI before.  He said he had not. Pt is very upset and frustrated he is unable to have more therapies. Pt's friends are encouraging him to call outside agencies for Pt advocates. He did call one agency but they have not yet called him back.  ZEB also, did give pt PT relations phone number.  SW again tried to explain the rules of Medicare for therapies.  SW did have Therapies Supervisor come and talk to pt.  Pt doesn't remember that conversation. SW has been trying hard to find placement for him.  SW has just been getting no's from every placement tried. Pt stated the [facility] has down he has an account at [employer]. But nobody knows anything about it. SW said once they have a three way conversation with the [employer] and that is still the case.  Have the bank write a letter to that effect and send that to the [facility] as proof pt doesn't have that account. ZEB offered support and listened to pt vent for an hour.     ZEB left [facility] in [place] Ph-[phone]. a vm stating pt has never been on CADI.     ZEB met with EW .  We are just waiting for MA to go through.      ZEB again send email to leadership asking if there was any response in placement after escalation?     NEREYDA Sharp   [facility], Transitional Care Unit   Social Work   [facility], [address] [place] [address]  (PH) [phone]

## 2023-03-01 NOTE — CONSULTS
Patient is on IR schedule 3/6/2023 for a left hip aspiration prior to hip re-implantation.  Currently has a left femur osteotomy transfixed with 2 cerclage wires with antibiotic impregnated cement spacer present in the acetabular fossa.  Patient's last surgery was November 22, 2022 -in 3 there should be a joint space around the existing hardware and antibiotic spacer with joint fluid.  Plan to attempt aspiration prior to removal of antibiotic spacer and reimplantation of prosthetic joint.    Requested on 3/6/2023 prior to left hip reimplantation surgery.    Labs WNL for procedure.    No NPO required.  Medications to be held include: none  Consent will be done prior to procedure.     Please contact IR charge at [phone] for estimated time of procedure.     Case discussed with requesting provider.    Jose Stockton PA-C  Interventional Radiology  Phone: [phone]  Pager: [phone]

## 2023-03-01 NOTE — CARE PLAN
Problem: Skin Injury Risk Increased  Goal: Skin Health and Integrity  Description: Perform a full pressure injury risk assessment, as indicated by screening, upon admission to care unit.    Reassess skin (full inspection and injury risk, including skin temperature, consistency and color) frequently (e.g., scheduled interval, with change in condition) to provide optimal early detection and prevention.    Maintain adequate tissue perfusion (e.g., encourage fluid balance; avoid crossing legs, constrictive clothing or devices) to promote tissue oxygenation.    Maintain head of bed at lowest degree of elevation tolerated, considering medical condition and other restrictions. Use positioning supports to prevent sliding and friction. Consider low friction textiles    Outcome: Progressing     Problem: Hip Arthroplasty  Goal: Optimal Functional Ability  Outcome: Progressing     Problem: Fall Injury Risk  Goal: Absence of Fall and Fall-Related Injury  Description: Provide a safe, barrier-free environment that encourages independent activity.    Keep care area uncluttered and well-lighted.    Determine need for increased observation or monitoring.    Avoid use of devices that minimize mobility, such as restraints or indwelling urinary catheter.      Outcome: Progressing     Problem: Infection  Goal: Absence of Infection Signs and Symptoms  Description: Implement transmission-based precautions and isolation, as indicated, to prevent spread of infection.    Obtain cultures prior to initiating antimicrobial therapy, when possible. Do not delay treatment for laboratory results in the presence of high suspicion or clinical indicators.    Administer ordered antimicrobial therapy promptly; reassess need regularly.    Monitor laboratory value, diagnostic test and clinical status trends for signs of infection progression.    Identify early signs of sepsis, such as increased heart rate and decreased blood pressure, as well as changes  in mental state, respiratory pattern or peripheral perfusion.    Outcome: Progressing     Problem: BADL (Basic Activities of Daily Living) Impairment  Goal: Optimal Safe BADL Performance  Description: Assess BADL (basic activity of daily living) abilities; encourage participation at maximally safe independent level.    Provide assistance and supervision needed to maintain safety; involve caregiver in BADL (basic activity of daily living) training.    Ensure effective use of equipment or devices, such as a long-handled reacher, shower seat or orthosis.    Ensure proper body mechanics and positioning for optimal task performance.    Provide set-up of items if patient is unable to retrieve; store personal care items in accessible location.    Schedule BADL (basic activity of daily living) activities when pain and fatigue are at a minimum; pace activity to conserve energy.        Outcome: Progressing     Problem: Oral Intake Inadequate  Goal: Improved Oral Intake  Description: Perform a nutrition assessment that includes a nutrition-focused physical exam; identify malnutrition risk.    Assess for adequate oral intake; if inadequate, offer oral supplemental food or drinks to enhance calorie and protein intake.    Assess for vitamin and mineral deficiencies; supplement if depleted.    Assess need and assist with with meal set-up and feeding.    Adjust diet or meal schedule based on preferences and tolerance.    Minimize unnecessary dietary restrictions to increase oral intake.    Outcome: Progressing     Problem: Diarrhea  Goal: Effective Diarrhea Management  Description: Provide fluid and electrolyte replacement to correct any imbalance through use of oral rehydration solution, nasogastric tube or intravenous fluid therapy.    Utilize skin protectant barrier to maintain skin integrity; cleanse gently, thoroughly and promptly after stooling; avoid alcohol-containing wipes.    Continue usual diet; encourage fluids (e.g.,  broth, soups, fruit juices).    Keep toilet, toileting devices and aids readily accessible and barrier-free to maintain safety.    Provide comfort measures and privacy.    Outcome: Progressing     Problem: Urinary Incontinence  Goal: Effective Urinary Elimination  Description: Identify cause and contributing factors, such as disease process, medication or treatment side effects, infection or dietary bladder irritants; provide treatment.    Assess for general symptoms, such as fatigue, depression, weakness, confusion, sensory alterations that could impact toileting.    Provide safe and ready access to toileting devices and aids (e.g., commode, urinal).    Promote behavioral methods, such as prompted toileting, elimination schedule, relaxation techniques or voiding posture to facilitate flow.    Consider pelvic floor muscle strengthening, such as pelvic floor muscle training, vaginal cone, bladder support or neuromodulation.    Ensure bladder emptying (e.g., intermittent catheterization, portable bladder ultrasound after voiding).    Outcome: Progressing     Problem: Pain Chronic (Persistent)  Goal: Optimal Pain Control and Function  Description: Evaluate pain level, effect of treatment and patient response at regular intervals.    Minimize pain stimuli; coordinate care and adjust environment (e.g., light, noise, unnecessary movement); promote sleep/rest.    Match pharmacologic analgesia to severity and type of pain mechanism (e.g., neuropathic, muscle, inflammatory); consider multimodal approach (e.g., nonopioid, opioid, adjuvant).    Provide medication at regular intervals; titrate to patient response.    Manage breakthrough pain with additional doses; consider rotation or switching medication    Outcome: Progressing     Problem: Oral Mucous Membrane Integrity Impairment  Goal: Improved Oral Health  Description: Assess oral cavity regularly.    Implement oral care program, such as brushing or swabbing oral  structures, flossing or oral rinses after meals and as needed based on assessed condition and comfort level.    Promote use of bland, nonalcohol-containing rinses for hygiene and comfort.    Remove dental appliances for cleaning.    Apply moisturizer to lips and mouth to prevent dryness and maintain integrity.    Monitor and address changes in oral intake.    Assess gag reflex to determine risk for aspiration; suction carefully to remove secretions as needed.    Encourage nonirritating food and fluids when able.  Manage pain; provide topical or oral analgesia, especially before meals.    Anticipate the need for topical or oral antimicrobial agents.    Outcome: Progressing     Problem: Depression  Goal: Improved Mood  Outcome: Progressing     Problem: Pain Acute  Goal: Optimal Pain Control and Function  Outcome: Progressing   Goal Outcome Evaluation: ongoing

## 2023-03-02 NOTE — PROGRESS NOTES
ZEB received an email that said [facility] will come and assess pt but pt needs active MA and active EW.   ZEB received and email from nephew that said they had a three way call with pt and [employer] and bank won't send a letter to nephew stating there is no bank account with the numbers the [facility] supplied.   ZEB emailed back asking what the [email or web address] solution was to the problem then?  We need proof there is no account.   Pollo emailed saying he will be here tomorrow to have signed POA papers.   ZEB faxed paperwork to Katie at [facility].    [facility]   Social Work   [facility] [place] [address], [address]th [place] [address] (ph) [phone]

## 2023-03-02 NOTE — PROGRESS NOTES
CLINICAL NUTRITION SERVICES - REASSESSMENT NOTE     Nutrition Prescription    RECOMMENDATIONS FOR MDs/PROVIDERS TO ORDER:  Encourage oral intakes, offer/provide snacks/supplements PRN between meals    Malnutrition Status:    Moderate malnutrition in the context of acute on chronic illness    Recommendations already ordered by Registered Dietitian (RD):  Encouraged continued adequate intakes  Continue Ensure Every other day    Future/Additional Recommendations:  Monitor labs, intakes, and weight trends.     EVALUATION OF THE PROGRESS TOWARD GOALS   Diet: Regular  Snacks/supplements: Ensure Enlive every other day  Intake/Tolerance: variable, 0-100% of documented meals. Does order from outside.     Pt ordering 1-2 meals per day, averaging 1550 kcal and 70 g protein per day per [employer] and if drinking ensure every other day, pt is likely meeting >70% minimum energy and >80% protein needs. Still getting food from outside so likely getting more than this.      NEW FINDINGS/REVIEW OF SYSTEMS    Nutrition/GI: Patient with varied intakes over last week however likely meeting needs if eating 1-2 meals daily from outside. No questions or concerns related to nutrition. Has multiple Ensure Enlive in room.     Weights: Pt pt previously down 60 lb (19%) in ~1.5 months. Pt seems weight stable since admission to TCU. No new weight since 1/30    Wt Readings from Last Encounters:   01/30/23 119.5 kg (263 lb 8 oz)   01/09/23 119.7 kg (264 lb)   11/22/22 147.1 kg (324 lb 4.8 oz)   11/14/22 142.9 kg (315 lb)   11/09/22 146.8 kg (323 lb 9.6 oz)   05/19/22 136.1 kg (300 lb)   07/03/18 148.9 kg (328 lb 4.8 oz)      Labs and meds reviewed    MALNUTRITION  % Intake: No decreased intake noted  % Weight Loss: Previous significant weight loss, now weight stable over 1 month.   Subcutaneous Fat Loss: Upper arm:  Mild-moderate  Muscle Loss: Upper arm (bicep, tricep):  Moderate, Upper leg (quadricep, hamstring):  Moderate and Posterior calf:   Moderate  Fluid Accumulation/Edema: Trace to mild  Malnutrition Diagnosis: Moderate malnutrition in the context of acute on chronic illness.     Previous Goals   Patient to consume % of nutritionally adequate meal trays TID, or the equivalent with supplements/snacks.  Evaluation: Met    Previous Nutrition Diagnosis  Predicted inadequate nutrient intake (protein-energy) related to eating mostly 100% of meals documented the past week with good appetite but potential for PO intake decline.   Evaluation: Updated    CURRENT NUTRITION DIAGNOSIS  Predicted inadequate protein-energy intake related to variable appetite as evidenced by pt reliant on PO intakes to meet 100% of nutritional needs with potential for variation    INTERVENTIONS  Implementation  Nutrition education for nutrition relationship to health/disease   Continue supplement PRN  Goals  Patient to consume % of nutritionally adequate meal trays TID, or the equivalent with supplements/snacks.    Monitoring/Evaluation  Progress toward goals will be monitored and evaluated per protocol.    Dee Alanis MS, RDN, LDN  RD pager: [phone]  WB Weekend/Holiday Pager: [phone]

## 2023-03-02 NOTE — CARE PLAN
Pt is alert and oriented x4, denies fever, chills, chest pain , SOB,N/V, abdominal pain. Pt incontinent of bowel, uses urinal at bedside. Pt slept well throughout the shift. Safety rounds completed. No lines, tubes, or drains. IR fine needle aspiration with ultrasound appointment scheduled for 3/6/23. Follow up by charge to be done today. Nursing will continue with poc.    Patient's most recent vital signs are:     Vital signs:  BP: 122/69  Temp: 96.5  HR: 74  RR: 16  SpO2: 97 %     Patient does not have new respiratory symptoms.  Patient does not have new sore throat.  Patient does not have a fever greater than 99.5.

## 2023-03-02 NOTE — PROGRESS NOTES
"PT alert and oriented X4. Able to make needs known. Denied SOB, CP, N/V. Incontinent of bowels and uses urinal for bladder. No questions for RN or MD but pt stated \"upset I cant get more therapy or placement\" empathetically listened and acknowledged feelings.  Passed onto pm RN that I was not able to follow up pre apt order clarifications for 3/6 and 3/7.         Patient's most recent vital signs are:     Vital signs:  BP: 139/67  Temp: 95.9  HR: 72  RR: 16  SpO2: 94 %     Patient does not have new respiratory symptoms.  Patient does not have new sore throat.  Patient does not have a fever greater than 99.5.         "

## 2023-03-03 NOTE — PLAN OF CARE
Goal Outcome Evaluation:    Pt is alert and oriented x4, able to make his needs known. Pt denied SOB, CP, N/V. Pt is incontinent of bowels and uses urinal at bedside. Pt refused wound assessment on the coccyx. Pt is a A2 with wallace. Pt is able to make needs known and uses the call light appropriately. Will continue with the plan of care.    Patient's most recent vital signs are:     Vital signs:  BP: 131/72  Temp: 97.1  HR: 77  RR: 18  SpO2: 96 %     Patient does not have new respiratory symptoms.  Patient does not have new sore throat.  Patient does not have a fever greater than 99.5.

## 2023-03-03 NOTE — PROGRESS NOTES
ZEB met with pt's nephew.  ZEB gave blank POA forms and we went through places to sign and fill out.  Pollo was meeting with ilda and then we going to the [facility].     NEREYDA Sharp   [facility], Transitional Care Unit   Social Work   [facility] [address], [address]th [place] [address]  (ph) [phone]

## 2023-03-03 NOTE — PLAN OF CARE
Goal Outcome Evaluation:    VS: /72 (BP Location: Right arm)   Pulse 77   Temp 97.1  F (36.2  C) (Oral)   Resp 18   Wt 119.5 kg (263 lb 8 oz)   SpO2 96%   BMI 37.81 kg/m     O2: RA   Output: Urinal at bedside; staff empties   Last BM: 3/1   Activity: Not OOB this shift   Skin: X; groin, coccyx   Pain: Denies    CMS Neuro: LLE NWB  A&O   Dressing: N/a    Diet: Reg, no b-fast, set up chocolate supplement in ice   LDA: N/a   Equipment: Liko, recliner   Plan: Con't POC   Additional Info: Pt has IR to Left hip scheduled on Mon 3/6 at 0800 (they will call when ready for pt - will need to send pt on cart).       Patient's most recent vital signs are:     Vital signs:  BP: 131/72  Temp: 97.1  HR: 77  RR: 18  SpO2: 96 %     Patient does not have new respiratory symptoms.  Patient does not have new sore throat.  Patient does not have a fever greater than 99.5.

## 2023-03-03 NOTE — PLAN OF CARE
Goal Outcome Evaluation:    Pt is alert and oriented x4, able to make his needs known. Pt denied SOB, CP, N/V. Pt is incontinent of bowels and uses urinal at bedside. Pt refused wound assessment on the coccyx. Pt is a A2 with wallace. Pt is able to make needs known and uses the call light appropriately. Will continue with the plan of care.    Patient's most recent vital signs are:     Vital signs:  BP: 139/67  Temp: 95.9  HR: 72  RR: 16  SpO2: 94 %     Patient does not have new respiratory symptoms.  Patient does not have new sore throat.  Patient does not have a fever greater than 99.5.

## 2023-03-03 NOTE — PLAN OF CARE
Patient is alert and oriented x4. Makes needs known to staff. Refuses repositioning in bed or skin check. Denied chest pain, SOB, N&V, headache and dizziness. Liko lift w/ assist-2 for transfers and cares. Stayed in bed all shift. Continent of bladder w/ urinal at bedside. Uss call-light appropriately. Will continue with POC.    Patient's most recent vital signs are:     Vital signs:  BP: 126/73  Temp: 96.9  HR: 72  RR: 16  SpO2: 94 %     Patient does not have new respiratory symptoms.  Patient does not have new sore throat.  Patient does not have a fever greater than 99.5.

## 2023-03-04 NOTE — PLAN OF CARE
Goal Outcome Evaluation:     Plan of Care Reviewed With: patient    Overall Patient Progress: no change    Pt has no c/o pain or discomfort so far this shift. Refused offer of assistance with repositioning. Using urinal in bed, staff empties. Has not been using his CPAP when sleeping. Pt has no c/o SOB and no s/s of respiratory issue noted. Appear to be sleeping/resting between cares/ meds. Continue with plan of care.    Patient's most recent vital signs are:     Vital signs:  BP: 126/73  Temp: 96.9  HR: 72  RR: 16  SpO2: 94 %     Patient does not have new respiratory symptoms.  Patient does not have new sore throat.  Patient does not have a fever greater than 99.5.

## 2023-03-04 NOTE — PLAN OF CARE
Goal Outcome Evaluation:    VS: /71 (BP Location: Right arm, Patient Position: Supine, Cuff Size: Adult Regular)   Pulse 74   Temp (!) 96.2  F (35.7  C) (Oral)   Resp 16   Wt 119.5 kg (263 lb 8 oz)   SpO2 97%   BMI 37.81 kg/m     O2: RA   Output: Urinal at bedside; staff empties   Last BM: 3/4 on bedpan   Activity: Up in chair   Skin: X; groin, coccyx   Pain: Denies    CMS Neuro: LLE NWB  A&O   Dressing: N/a    Diet: Reg, no b-fast, pt drank 2 chocolate supplement in ice   LDA: N/a   Equipment: Liko, recliner   Plan: Con't POC   Additional Info: Pt has IR to Left hip scheduled on Mon 3/6 at 0800 (need to confirm if they will call when ready for pt or if pt should be NPO 8 H prior and ready at 0615).      Patient's most recent vital signs are:     Vital signs:  BP: 115/71  Temp: 96.2  HR: 74  RR: 16  SpO2: 97 %     Patient does not have new respiratory symptoms.  Patient does not have new sore throat.  Patient does not have a fever greater than 99.5.

## 2023-03-04 NOTE — PROGRESS NOTES
[facility]  Transitional Care Unit Progress Note  Waldo Bustos  [id number]  March 4th, 2023         Assessment & Plan:     Waldo Bustos is a 72 year old male with a history of morbid obesity, DM2, HLD, JAIR, PE/DVT on chronic apixaban, venous insufficiency, OA, and chronic L hip prosthetic joint infection who was admitted to [facility] 11/22/22 for scheduled explantation of left hip prosthesis, debridement, and reconstruction with antibiotic cement spacer. Hospital course was complicated by a toxic encephalopathy, acute hypoxic respiratory failure due to obesity hypoventilation syndrome,  acute blood loss anemia,  HALEY, and profound physical deconditioning. He was transferred to TCU on 12/24/22 for rehabilitation. He is no longer progressing with therapies due to both pain and lack of motivation and unwillingness to move with therapy. He is currently awaiting placement at a long term SNF.    # Severe weakness and debility, no longer on therapies due to unwillingness to progress with PT/OT and pain  -PT/OT have stopped working with him after months of working on in bed therapies that he frequently refused to do exercises, work with staff on any goals or move in his bed to start making progress. After months of reattempting this, his PT/OT staff no longer are making reattempts. Mr. Bustos states that he wants PT and wants to get better, but is not interested in doing movement that causes pain. He is nervous about his upcoming Orthopedic appointment and the zero progress he has made while at [facility]. I offered PT to come back and visit with him again, and Mr. Bustos stated that that would not be helpful as he had already talked to them multiple times.     # Chronic left hip PJI s/p explantation, debridement, and reconstruction with antibiotic spacer (11/22/22)  Patient presented [facility]/22 for scheduled explantation of left hip prosthesis, debridement, and reconstruction with  antibiotic cement spacer with Dr. Meyers.  Surgical cultures grew Finegoldia magna and Proteus mirabilis.  Completed 6-week course of oral Cipro and Flagyl on 1/3/23.  He is currently on an 8-week antibiotic holiday prior to reeval for reimplantation surgery.    - Continue to check weekly CRP  - NWB to LLE  - Pain control with Tylenol 1000 mg 3 times daily as needed  - IR consulted and set up a left hip aspiration for 3/6/23 which will need cell count, differential, alpha defense and synovasure, aerobic/anaerobic fungal cultures.  If infectious work-up negative, then would proceed with second stage reimplantation surgery  - Orthopedic Surgery appointment with Dr. Meyers scheduled for 3/20/23     # Right hand pain, numbness and limited function  He has complained of right hand and finger numbness and pain for weeks and reports that it is getting worse. We o  Discussed brace to wear on right hand at night, at this point patient is refusing.  -Get x-ray of right hand on Nic 3/5/23  -Consider CT of right hand and/or Neurology consult if numbness gets significantly worse  -Brace was offered and patient refused     # Diabetes mellitus type 2 (A1c 6.8% 11/2022)  Well controlled.  PTA glipizide has been discontinued with good blood sugar control  -Continue metformin 2000 mg daily, Actos 45 mg daily, Victoza 1.8 mg daily (substituted for PTA Trulicity)  -Blood glucose checks as needed  -Recheck Hb A1c in May at primary care      # Intertrigo-left groin  -Miconazole powder twice daily     # Unprovoked DVT in L common femoral, deep and superficial femoral, popliteal, posterior tibial veins and PE (2018) on chronic anticoagulation  -Continue home regimen of Eliquis 5 mg twice daily indefinitely     # JAIR  -Uses CPAP at night      # Anemia of chronic disease  -Hemoglobin is 10.6g/dL and stable. Continue to monitor and transfuse to maintain Hgb of 7 or greater    # Neck pain  - Continue Tylenol, Robaxin, heat as needed.   Diclofenac gel 2 g 4 times daily      Diet and/or tube feedings: regular   Lines, tubes, drains: peripheral IV present  DVT/GI prophylaxis: DOAC  Indications for psychotropic medications: He is not on any psychotropic medications  Code status: FULL CODE         Discharge Planning:   Awaiting placement at long term care facility as patient has not cooperated with even small amounts of therapy for months        Interval History:   Mr. Bustos was resting in bed, no acute distress, reporting how sad and frustrated he is that he has not made more progress with therapy while at TCU, a repeat conversation from earlier in the week. I discussed that he is always welcome to start PT if he is ready to move in bed and out of bed to do the work but it would take a lot of compromise and would not be comfortable. We also discussed his right hand numbness which he states has been the same for a few weeks, he can still hold onto a glass and feed himself and denies any trauma.         Physical Exam:   Vitals were reviewed  Blood pressure 115/71, pulse 74, temperature (!) 96.2  F (35.7  C), temperature source Oral, resp. rate 16, weight 119.5 kg (263 lb 8 oz), SpO2 97 %.  General:72  Year old morbidly obese gentleman resting in bed,no acute distress, denies pain at rest  HEENT: NCAT, anicteric sclera, EOMI, mucous membranes pink and moist  Cardiovascular: Regular rate and rhythm, no appreciable murmurs, rubs or gallops  Lungs: breathing comfortably on room air, no adventitious sounds to bilateral auscultation  Abdomen: obese abdomen, tinkering bowel sounds present, soft, non-tender to palpation throughout  Vascular: 1+ pretibial pitting edema bilaterally  Musculoskeletal: right hand is normal in appearance, able to  my hand, reports significant dullness to pin prick over fingertips and palmar surface, some pain when trying to do full range of motion  Skin: intertriginous erythema in groin region      Sasha Posada PA-C,  [facility]  Internal Medicine [facility] Hospitalist  [phone]

## 2023-03-05 NOTE — PLAN OF CARE
Goal Outcome Evaluation:       Pt had no acute issue this shift. Stayed in bed through out the shift. Pt denied having any discomfort this shift. Urinal at bedside this shift. Appears comfortable at this time. Will continue with POC.       Patient's most recent vital signs are:     Vital signs:  BP: 132/72  Temp: 95.9  HR: 68  RR: 18  SpO2: 93 %     Patient does not have new respiratory symptoms.  Patient does not have new sore throat.  Patient does not have a fever greater than 99.5.

## 2023-03-05 NOTE — PLAN OF CARE
Goal Outcome Evaluation:     Plan of Care Reviewed With: patient    Overall Patient Progress: no change    Pt has no c/o pain or discomfort so far this shift. Awake at midnight watching TV. Refused offer of assistance with repositioning but allowed staff to placed pillow to make him comfortable in bed as pt claimed the bed tend to lean on his R side. Encouraged to call during the night if he needs to reposition or even shift his weight in bed. Pt  Acknowledged but had made no calls up until this time.  Using urinal in bed, staff empties. Has not been using his CPAP when sleeping. Pt has no c/o SOB and no s/s of respiratory issue noted. Appear to be sleeping/resting between cares/ meds. Continue with plan of care.    Patient's most recent vital signs are:     Vital signs:  BP: 132/72  Temp: 95.9  HR: 68  RR: 18  SpO2: 93 %     Patient does not have new respiratory symptoms.  Patient does not have new sore throat.  Patient does not have a fever greater than 99.5.

## 2023-03-05 NOTE — PLAN OF CARE
Goal Outcome Evaluation:    VS: BP (!) 142/55 (BP Location: Right arm)   Pulse 77   Temp (!) 96.5  F (35.8  C) (Oral)   Resp 18   Wt 119.5 kg (263 lb 8 oz)   SpO2 94%   BMI 37.81 kg/m      O2: RA   Output: Urinal at bedside; staff empties   Last BM: 3/4 on bedpan   Activity: Up in wheelchair  Had xray of right hand  Family visited   Skin: X; groin, coccyx   Pain: To neck; Tylenol and Robaxin x1 w/ little relief   CMS Neuro: LLE NWB  A&O   Dressing: N/a    Diet: Reg, no b-fast, supplemental drinks at bedside   LDA: N/a   Equipment: Liko, recliner, w/c   Plan: Con't POC   Additional Info: Pt moved to Rm [id number]     Patient's most recent vital signs are:     Vital signs:  BP: 142/55  Temp: 96.5  HR: 77  RR: 18  SpO2: 94 %     Patient does not have new respiratory symptoms.  Patient does not have new sore throat.  Patient does not have a fever greater than 99.5.

## 2023-03-06 NOTE — PLAN OF CARE
Goal Outcome Evaluation:     Plan of Care Reviewed With: patient    Overall Patient Progress: no change    Pt has IR appt this am for FNA W ULTRASOUND at 0800 but needs to be at IR by 0630. Per report, pt is aware of the apt and NPO Status/ Clear liquid until 0600 per General IR standard instruction. This is different prep instruction on 3/1/23 IR provider's note (Jose Stockton PA-C)who will be doing the procedure himself:                                       Date of Service:  3/1/2023  2:18 PM                                     Creation Time:  3/1/2023  2:09 PM  Consult Orders   Interventional Radiology Adult/Peds IP Consult: Which location will this be scheduled at? [place]; Consult Reason? 71yo M at TCU after recent orthopedic surgery who needs left hip aspiration on 3/6; Patients clinical information/history? Please... [[id number]] ordered by Sasha Posada PA at 03/01/23 1222        Patient is on IR schedule 3/6/2023 for a left hip aspiration prior to hip re-implantation.  Currently has a left femur osteotomy transfixed with 2 cerclage wires with antibiotic impregnated cement spacer present in the acetabular fossa.  Patient's last surgery was November 22, 2022 -in 3 there should be a joint space around the existing hardware and antibiotic spacer with joint fluid.  Plan to attempt aspiration prior to removal of antibiotic spacer and reimplantation of prosthetic joint.     Requested on 3/6/2023 prior to left hip reimplantation surgery.     Labs WNL for procedure.    No NPO required.  Medications to be held include: none  Consent will be done prior to procedure.      Please contact IR charge at [phone] for estimated time of procedure.      Case discussed with requesting provider.    Jose Stockton PA-C  Interventional Radiology  Phone: [phone]  Pager: [phone]        Pt already sleeping when RN did rounds so unable to clarify fluid/food restrictions if any. IR questionnaire in epic updated by this RN  with important info: Pt on Eliquis 2x/day and no meds were held. Baudilio lift for transfer.  No meds to be held per IR provider note but at the same time answered NO anticoagulant in the questionnaire -?.No meds or food taken this am. Pt regularly takes his am meds between 10-11 am. RN will call IR around 0600 ( #[phone]) to clarify if pt must be there by 0630 or IR will call unit when ready for pt but not open until 0700.  Will assist this am and will sent to IR.  Pt has no c/o SOB and no s/s of respiratory issue noted at RA. Appear to be sleeping/resting between cares/ meds. Continue with plan of care.    0700: called IR to inquire if pt should be send to IR at this time as epic says pt needs to be at IR at 0630. Per IR staff, they will call unit  If they have called house transport to  pt and will be closer to 0800. AM RN informed.    Patient's most recent vital signs are:     Vital signs:  BP: 131/59  Temp: 97  HR: 74  RR: 18  SpO2: 95 %     Patient does not have new respiratory symptoms.  Patient does not have new sore throat.  Patient does not have a fever greater than 99.5.

## 2023-03-06 NOTE — PLAN OF CARE
Goal Outcome Evaluation:    Patient is alert and oriented x 4. He is able to make his needs known. Pt denied SOB and chest pain. He also denied nausea and vomited. He is able to swallow without difficulty. Incision site is clean, dry and open to air. Pt verbalized concern about his X-ray appointment tomorrow. Educated pt on what to do before and what to expect during and after the exam.     Patient's most recent vital signs are:     Vital signs:  BP: 131/59  Temp: 97  HR: 74  RR: 18  SpO2: 95 %     Patient does not have new respiratory symptoms.  Patient does not have new sore throat.  Patient does not have a fever greater than 99.5.

## 2023-03-06 NOTE — PROCEDURES
Interventional Radiology Brief Post Procedure Note    Procedure: IR FINE NEEDLE ASPIRATION W ULTRASOUND    Proceduralist: Jose Stockton PA-C    Assistant: None    Time Out: Prior to the start of the procedure and with procedural staff participation, I verbally confirmed the patient s identity using two indicators, relevant allergies, that the procedure was appropriate and matched the consent or emergent situation, and that the correct equipment/implants were available. Immediately prior to starting the procedure I conducted the Time Out with the procedural staff and re-confirmed the patient s name, procedure, and site/side. (The Joint Commission universal protocol was followed.)  Yes    Medications   Medication Event Details Admin User Admin Time       Sedation: None. Local Anesthestic used    Findings: Attempted left hip aspiration. Patient does not have normal anatomy with antibiotic spacer in place currently. Joint aspiration attempted with 18 gauge spinal needle in 4 different areas with fluoro and ultrasound guidance - no fluid able to be aspirated. Message sent to team.     Estimated Blood Loss: Minimal    Fluoroscopy Time:  1.5 minute(s)    SPECIMENS: None    Complications: 1. None     Condition: Stable    Plan: follow-up per primary team.     Comments: See dictated procedure note for full details.    Jose Stockton PA-C

## 2023-03-06 NOTE — PLAN OF CARE
Patient is alert and oriented x4. Able to make needs known to staff. Continent of both bowel and bladder. Urinal at bedside. Transfers with Assist-2 with liko lift. IR procedure this morning but no fluid was aspirated from site. Dressing to left hip after IR procedure remains intact, dry, and clean. Patient denies pain at site. Denied chest pain, SOB, N&V, dizziness, and headaches. Call-light within reach. Will continue with POC.    Patient's most recent vital signs are:     Vital signs:  BP: 131/65  Temp: 98  HR: 77  RR: 16  SpO2: 94 %     Patient does not have new respiratory symptoms.  Patient does not have new sore throat.  Patient does not have a fever greater than 99.5.

## 2023-03-07 NOTE — PLAN OF CARE
0700H-2300H     Goal Outcome Evaluation:    Appointment for Left Hip XR Joint cancelled for today ( done IR Fine needle Aspiration with Ultrasound yesterday but no fluid was aspirated) , no rescheduling, waiting for orders, patient updated. With intact primapore dressing to left groin ( from procedure done yesterday) . No other changes, no acute issues during this shift. Pain managed with PRN: Tylenol and Robaxin given x 1. Refused to get up in bed,reapproached several times throughout the day but is refusing meals also, doesn't feel hungry per patient.    1905H: Patient has been eating poorly, has been skipping breakfast, lunch , had late 1900H supper (ate 100% of his supper) , all due  8AM-9AM DM (Metformin, Actos,Victoza) meds not given after pharmacist consulted (Rosa Maria) due to risk of hypoglycemia over the night.Note left to provider to evaluate patient and meds.       Refusing repositioning also when in bed . Comfortable at this time. To continue POC.     Patient's most recent vital signs are:     Vital signs:  BP: 129/66  Temp: 95.1  HR: 80  RR: 18  SpO2: 97 %     Patient does not have new respiratory symptoms.  Patient does not have new sore throat.  Patient does not have a fever greater than 99.5.

## 2023-03-08 NOTE — PROGRESS NOTES
ZEB emailed nephew to see how trip to [facility] went.  Nephew was going to try and find the account the [facility] said was there.   ZEB emailed Billy [facility].  One doesn't have open beds.  Checking on other two. Other two can't take wallace lifts. At this time Billy can't help pt.     NEREYDA Sharp   [facility], Transitional Care Unit   Social Work   [address] [place] [address], [address] [place] [address]  () [phone]

## 2023-03-08 NOTE — PLAN OF CARE
Problem: Hip Arthroplasty  Goal: Optimal Functional Ability  Intervention: Promote Optimal Functional Status  Recent Flowsheet Documentation  Taken 3/8/2023 1132 by Jewell Lynn, RN  Assistive Device Utilized: lift device  Activity Management: activity adjusted per tolerance     Problem: Range of Motion Impairment  Goal: Optimal Range of Motion  Intervention: Maintain Functional Joint Range Position  Recent Flowsheet Documentation  Taken 3/8/2023 1132 by Jewell Lynn, RN  Positioning/Transfer Devices:   pillows   in use   Goal Outcome Evaluation:  Patient is on bed, refused for bed repositioning. Did not eat breakfast but was able to convince to drink Ensure took 100%. Complaining of pain on his neck applied Voltaren gel.Urinal is at bedside. Call light with in reach. Continue with current plan of care.    Blood pressure 111/51, pulse 67, temperature 96.8  F (36  C), temperature source Oral, resp. rate 16, weight 117.7 kg (259 lb 8 oz), SpO2 96 %.

## 2023-03-08 NOTE — PLAN OF CARE
Goal Outcome Evaluation:    Patient alert and oriented per baseline, stayed awake while in bed upto after 2am rounds. Patient denied pain,CP or SOB. Declined repositioning, uses urinal independently with staff emptying. No concerns during shift, continue with POC.      Patient's most recent vital signs are:     Vital signs:  BP: 129/66  Temp: 95.1  HR: 80  RR: 18  SpO2: 97 %     Patient does not have new respiratory symptoms.  Patient does not have new sore throat.  Patient does not have a fever greater than 99.5.

## 2023-03-08 NOTE — PROGRESS NOTES
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 on 11/22/22 for scheduled explantation of left hip prosthesis, debridement, and reconstruction with antibiotic cement spacer. Hospital stay was c/b acute encephalopathy (attributed to anesthesia and sedating meds), acute hypoxic respiratory failure (attributed to obesity hypoventilation syndrome), acute blood loss anemia, HALEY (thought pre renal), and profound physical deconditioning. Transferred to TCU for ongoing rehabilitation. He is now awaiting community placement after not making progress here.     #Physical Debility. PT/OT no longer working with patient after several months of in-bed therapy and refusals to mobilize. SW is following for community placement.     #Chronic Left Hip PJI s/p explantation, debridement, and reconstruction with antibiotic spacer (11/22/22). Dr. Meyers. Surgical cultures grew Finegoldia magna and Proteus mirabilis (also had C. acnes in the past but doing well despite no directed treatment of this). S/p 6 wk course of PO Cipro and Flagyl per ID ended 1/3/23 - noted potential higher risk for treatment failure given comorbidities, sinus tract, and history of PJI. Saw Dr. Meyers 1/9/23 and completed 8 wk antibiotic free holiday after which time left hip aspiration was attempted by IR on 3/6 but no fluid could be obtained. No longer requiring pain meds.   - NWB LLE. No longer willing to participate in therapies as movement causes pain.   - [email or web address] message sent to Dr. Meyers notifying him of inability to sample fluid this week (had wanted cell count, differential, alpha defense and synovasure, aerobic/anaerobic/fungal cultures). Next clinic " visit is 3/20 to discuss second stage reimplantation surgery (but this was pending negative infectious w/u and patient had also been encouraged to mobilize and lose weight).     #DM II. HgbA1C has improved from 6.8 to 5.8% while here. BG well controlled.   - Continue home metformin.   - Continue Victoza being used here in place of PTA Trulicity.   - Continue home Actos but low threshold to stop this if any issues with hypoglycemia.   - No plan to resume home glipizide (not needed and has risk for hypoglycemia).     #Right Hand Numbness. Reported to prior providers. 3/5 x-ray did not show acute pathology. Questioned if he was developing some compressive neuropathy d/t positioning off left hip in bed for several months but patient states this was present before this. He declines desire for further w/u at this time.   - OP neurology if patient desires.      #JAIR. Continue home CPAP.     #Hx DVT/PE. Continue home apixaban.    #Chronic Neck Pain. Stable. Continue diclofenac gel with rare PRN Tylenol/Robaxin use.     CODE: full  DVT: apixaban  Diet: regular  Indications for Psychotropic Medications: N/A  Disposition: awaiting [facility] placement     Liliya Lai PA-C  Hospitalist Service  Pager: [phone]  ________________________________________________________________    Subjective & Interval History:  No new complaints. Nursing was concerned patient only ate one meal yesterday. He states he is eating fine and never eats breakfast. No low blood sugars documented. Prefers to keep current regimen. Pain controlled.      Last 24 hour care team notes reviewed.     Physical Exam:    Blood pressure 111/51, pulse 67, temperature 96.8  F (36  C), temperature source Oral, resp. rate 16, weight 117.7 kg (259 lb 8 oz), SpO2 96 %.    GENERAL: Alert and oriented x 3. Lying in bed, appears comfortable. Conversant.   HEENT: Anicteric sclera. Mucous membranes moist.   CV: RRR. S1, S2. No murmurs appreciated.   RESPIRATORY: Effort normal  on room air. Lungs CTAB with no wheezing, rales, rhonchi.   NEUROLOGICAL: No focal deficits. Moves all extremities.    EXTREMITIES: No peripheral edema.   SKIN: No jaundice. No rashes.     Lines/Tubes/Drains:      Medical Decision Makin MINUTES SPENT BY ME on the date of service doing chart review, history, exam, documentation & further activities per the note.      Data:  Reviewed BG

## 2023-03-09 NOTE — PROGRESS NOTES
01/05/23 1042   Appointment Info   Signing Clinician's Name / Credentials (OT) FREEMAN Bustos   Self-Care/Home Management   Self-Care/Home Mgmt/ADL, Compensatory, Meal Prep Minutes (59226) 30   Treatment Detail/Skilled Intervention Th requests pt work on bed mobility, to use the control he has of BUE and RLE to manage rolling or assisting LLE. Pt describes the pain he feels during and after but agrees to try.  Pt states he does not roll at home, never has.  He describes a rope attached to the bottom of his bed and he pulls himself up. Th provides a transfer belt for this purpose but pt declines.  He reports he will exercise if he is up in the recliner but otherwise th is suggesting treatment and activity he shouldn't be expected to do for 4 months from now if he has hip replaced.  Th provides encouragement for pt to use the nonaffected limbs he has to control his body for repositioning, pain relief and mobility.   OT Discharge Planning   OT Plan OT: bed mob, discuss plan for toileting, core/UE strengthening, act tolerance, functional sitting position in recliner for ADLs, coRx w/PT   OT Brief overview of current status No progress today, pt resistive to OT, c/o pain.  Focus of OT has been for pt to sit upright in the recliner, roll in bed/reposition and participate in ADL/ex but pt has demo limited participation.  Trial co-tx with PT.   Total Session Time   Timed Code Treatment Minutes 30   Total Session Time (sum of timed and untimed services) 30   Post Acute Settings Only   What unit is patient on? TCU

## 2023-03-09 NOTE — PROGRESS NOTES
"   01/03/23 0946   Appointment Info   Signing Clinician's Name / Credentials (PT) Macy Florence PTA   PT Assistant Visit Number 1   Therapeutic Activity   Therapeutic Activities: dynamic activities to improve functional performance Minutes (93600) 30   Symptoms Noted During/After Treatment Increased pain   Treatment Detail/Skilled Intervention PT; Focus of session to encourage OOB activities, sitting in w/c or upright recliner. Significant time required for Pt to agree to participation. Pt repeatedly c/o pains in body and in neck. Educated Pt on importance of OOB activities. Pt agreealbe to work on shoulder and neck exercises in tomorrow's session. After signficant time and encouragement, Pt agreeable to work on positioning in recliner and refused w/c stating \"I feel like I am going to slide out no matter what they do\". A x 2 for liko lift. Pt refused rolling for improved positioning of sling, extra care taken for lifting legs to don sling. Pt grabs ceiling lift frame and despite education and v/c to release ceiling lift frame, Pt angrily states \"Well this is what I do!\".  Lifted pt to chair. Educated Pt on maintaining LLE significantly externally rotated but Pt unwilling to allow repositioning d/t pain.   PT Discharge Planning   PT Plan PT: encouraged OOB activities, try getting sling under hips for improved support during transfers, HEP program for neck and shoulder stretching and strengthening (see if Pt will come down to gym in w/c to perform, if not, perform in room)   PT Discharge Recommendation (DC Rec) home with home care physical therapy;home with assist;Long term care facility   PT Rationale for DC Rec PT: pt is not mobile at this point, will likely be NWB at discharge and has stairs he needs to perform daily to be in his home   PT Brief overview of current status PT: dependent bed mobility and transfers, limited by size, shape, weakness and pain.   Total Session Time   Timed Code Treatment Minutes 30 "   Total Session Time (sum of timed and untimed services) 30

## 2023-03-09 NOTE — PROGRESS NOTES
01/12/23 0184   Appointment Info   Signing Clinician's Name / Credentials (PT) Azeb Quinonez DPT   Rehab Comments (PT) PT: co treat with OT   Therapeutic Procedure/Exercise   Ther. Procedure: strength, endurance, ROM, flexibillity Minutes (91763) 15   Treatment Detail/Skilled Intervention PT: pt perform rickshow with skilled A x 2 for positioing and verbal and visual cues for rickshaw 15# for single arm x 8 x 2. Pt given explanation for shoulder ex to perform in room using 4# dumbell.   Therapeutic Activity   Therapeutic Activities: dynamic activities to improve functional performance Minutes (32574) 40   Symptoms Noted During/After Treatment Increased pain   Treatment Detail/Skilled Intervention PT: Co-Treat with OT for improving transfers and standing tolerance. set up wtih assist in much the same way that has already been documented (see flowsheet from 1/9), pt requires skilled assist for donning towels, etc for sling to aide in pain management. Once in chair via liko lift, brought to gym. Pt discusses that he doesn't feel like trying to stand is very functional and it is not improving his arm strength. Upon further discussion decide to lift pt to mat and work on core strength and balance. Pt lifted to mat with skilled A x 3 for positioning, managing LE and placement on mat and support from behind. Pt unable to tolerate sitting on the mat, feels like he is sliding off. Lifted back to chair using GOLVO. Will trouble shoot for tomorrow's session. Pt requries increased skilled consideration for managing L LE throughout entire [facility]. Brought back  and liko lifted into bed, increased skilled time for positioning for safety and comfort.   PT Discharge Planning   PT Plan PT: Co-tx w/OT, trouble shoot sitting edge of mat to address core strength and balance, any other functional exercise that is tolerated   PT Discharge Recommendation (DC Rec) Long term care facility   PT Rationale for DC Rec PT: pt is not  mobile at this point, will likely be NWB at discharge and has stairs he needs to perform daily to be in his home   PT Brief overview of current status PT: dependent bed mobility and transfers, limited by size, shape, weakness and pain. Self limiting but blames soft bed, uncomfortable w/c, arms to high on recliner.....   Total Session Time   Timed Code Treatment Minutes 55   Total Session Time (sum of timed and untimed services) 55   Post Acute Settings Only   What unit is patient on? TCU

## 2023-03-09 NOTE — PROGRESS NOTES
01/10/23 1100 01/10/23 1500   Appointment Info   Signing Clinician's Name / Credentials (PT)  --  Chyna Ramirez DPT   Therapeutic Activity   Therapeutic Activities: dynamic activities to improve functional performance Minutes (30450)  --  45   Treatment Detail/Skilled Intervention  --  PT: Co-Treat with OT for imrpoving transfers and standing tolerance. set up wtih assist in much the same way that has already been documented (see flowsheet from 1/9), pt requires skilled assist for donning towels, etc for sling to aide in pain management. Once in chair via liko lift, brought to gym. Performed 3 stands total with increased time to rest and recover each set due to fatigue and pain. first two stands were Mod A x 3, but finally pt is able to stand a 3rd time with Mod/max A x 2. Pt's longest static stand is 35 sec with cues for full extension into standing. Pt again requries increased skilled consideration for managing L LE throughout entire [facility]. Brought back  nd liko lifted into bed, increased skilled time for positioning for safety and comfort   PT Discharge Planning   PT Plan  --  PT: Co-tx w/OT, Continue with attempting to  // bars, any other functional exercise that is tolerated   PT Discharge Recommendation (DC Rec)  --  Long term care facility   PT Rationale for DC Rec  --  PT: pt is not mobile at this point, will likely be NWB at discharge and has stairs he needs to perform daily to be in his home   PT Brief overview of current status  --  PT: dependent bed mobility and transfers, limited by size, shape, weakness and pain. Self limiting but blames soft bed, uncomfortable w/c, arms to high on recliner.....   Total Session Time   Timed Code Treatment Minutes 40 45   Total Session Time (sum of timed and untimed services) 40 45

## 2023-03-09 NOTE — PROGRESS NOTES
01/06/23 1146   Appointment Info   Signing Clinician's Name / Credentials (OT) FREEMAN Bustos   Self-Care/Home Management   Self-Care/Home Mgmt/ADL, Compensatory, Meal Prep Minutes (30944) 45   Treatment Detail/Skilled Intervention Yesterday, pt indicating he should work on UE ex but not do any other therapy over the next period of weeks/months pending L hip replacement surgery.  Today, pt more motivated and agreed to sitting EOB with PT/OT cotx.  Max A of 2 and extra time, one th managed pt's LLE for support and to avoid pain, other th provided pt with a strap at the bottom of the bed and trunk control to get forward.  Pt inched his way to EOB, mostly leaned off of his L hip but sat EOB for approx 15 minutes.  Back to supine required extra time until pt could not participate due to soft bed, so pt went from max of 2 to total assist of 3 for repositioning.  Skilled OT/PT recommended to support pt's LLE and provide assist with positioning and effort with goals of improved mobility for toileting and ADL.   OT Discharge Planning   OT Plan OT: bed mob, discuss plan for toileting, core/UE strengthening, act tolerance, functional sitting position in recliner for ADLs, coRx w/PT.  Try transfer to w/c on 1/7 with a trip to the parrallel bars (pt inquired about standing on one leg during session on 1/6/23).   OT Brief overview of current status Pt willing to get to EOB, max A of 2.  Pt participated to his ability, limited c/o but needed total suppport of LLE and support at his trunk.  Getting back into bed was assist of 3.  Overall pt participated well, therapist encourages pt to take his pain meds prior to session (he declined them today per his report), as he has c/o pain during activity and even after activity in the past.  Despite min c/o today, LLE pain is a barrier to increase IND.  Recommend con't co-tx to improve mobility/ADL.   Total Session Time   Timed Code Treatment Minutes 45   Total Session Time (sum  of timed and untimed services) 45   Post Acute Settings Only   What unit is patient on? TCU

## 2023-03-09 NOTE — PROGRESS NOTES
01/10/23 0639   Appointment Info   Signing Clinician's Name / Credentials (OT) gAa Reddy, OTR/L [id number]   Rehab Comments (OT) OT: CoRx w/ PT   Therapeutic Activities   Therapeutic Activity Minutes (14625) 45   Treatment Detail/Skilled Intervention OT: coRx w/PT, requires skills of both therapists for skilled handling of pt/LLE w/ mobiity and to problem solve approach for decreased pain and safe positioning and approach to standing w/ LLE NWB and pt's pain and large habitus, pt demo 3 STS transfers in parellel bars from w/c  , initially required asisst of 3 people (PT/OT/ rehab tech), w/ repeated standing pt reduced assist to 2 staff and kolby increased length of time standing and assist needed to get into standing, pt kolby up to 35 sec longest stand   OT Discharge Planning   OT Plan OT: Pt needs max A to manage LLE during all activity. LLE NWB.  Tx: bed mob, discuss plan for toileting, core/UE strengthening, act tolerance, functional sitting position in recliner for ADLs, coRx w/PT.  To/from EOB (assist of 2 to edge and A of 3 back into bed on 1/6).  Standing in parrallel bars (A of 2 LIKO lift, A of 2 parrallel bars )   OT Brief overview of current status OT: demo increased kolby for standing w/ less assist to get into standing but continues to be limited by pain in L hip   Total Session Time   Timed Code Treatment Minutes 45   Total Session Time (sum of timed and untimed services) 45   Post Acute Settings Only   What unit is patient on? TCU

## 2023-03-09 NOTE — PROGRESS NOTES
"   01/04/23 9760   Appointment Info   Signing Clinician's Name / Credentials (PT) Macy Florence PTA   PT Assistant Visit Number 2   Therapeutic Procedure/Exercise   Ther. Procedure: strength, endurance, ROM, flexibillity Minutes (74257) 30   Treatment Detail/Skilled Intervention PT: Pt presented supine in bed and sleeping. Easily roused and agreeable to perform cervical ROM stretches d/t Pt reporting pain and stiffness in cervical area. Issued and performed cervical/shoulder ROM stretches for 1 set x 10 reps ea: shoulder rolls forward/backward, anterior/posterior/lateral ROM, lateral stretch w/UE abd for improved upper trap stretch, chin tucks. Per performed rows w/GTB while long sitting in bed. Pt reported \"these feel good\". Encouraged Pt to perform 2x/day. Pt agreeable to add in isometric strengthening in tomorrow's session.   PT Discharge Planning   PT Plan PT: perform cervical/shoulder ROM add isometric cervical exercises (HEP in room)   Total Session Time   Timed Code Treatment Minutes 30   Total Session Time (sum of timed and untimed services) 30   Post Acute Settings Only   What unit is patient on? TCU       "

## 2023-03-09 NOTE — PROGRESS NOTES
12/30/22 0936   Appointment Info   Signing Clinician's Name / Credentials (OT) BRII Paul   OT Assistant Visit Number 3   Rehab Comments (OT) OT: Pt. pain limting wanting to trial any mobility at this time. Pt. encouraged to get pain med prior to PT session.   Self-Care/Home Management   Self-Care/Home Mgmt/ADL, Compensatory, Meal Prep Minutes (50196) 25   Treatment Detail/Skilled Intervention OT: Pt. requested shower with OT although due to time scheduled unable to complete shower at this time. OT provided patient information on shower days saturday and tuesday and left note for charge RN to pass along patient would like shower versus sponge bath.  Pt. participated in basic UE grooming/hygiene with set up and HOB elevated. Min assist needed to change gown.  Pt. reports has not received pain meds at this time and pain is limiting during this session. Pt. declined to work toward EOB sitting or transfer into wheelchair.   Total Session Time   Timed Code Treatment Minutes 25   Total Session Time (sum of timed and untimed services) 25   Post Acute Settings Only   What unit is patient on? TCU

## 2023-03-09 NOTE — PROGRESS NOTES
"   01/14/23 1640   Appointment Info   Signing Clinician's Name / Credentials (OT) SHIRA Stover/L   OT Assistant Visit Number 8   Therapeutic Activities   Therapeutic Activity Minutes (91015) 40   Treatment Detail/Skilled Intervention BRII: Co-tx with PT to work towards improved tolerance towards mobility. Two skilled providers required for positioning, trouble shooting hip pain, and safety. Liko lift Ax2 supine > EOB with Ax1 to manage LLE and Ax1 to manage lift controls/sling. Slow transfer per pt request. While sitting EOB, Ax1 posteriorly and Ax1 anteriorly. Once Les supported on stool, pt able to sit with SBA/CGA if pt uses daniella Ues for support. Verbal cues provided for midline and postural control as pt sits with neck and trunk flexed. Facilitated lateral leans, small modified core \"sit ups\", and functional unilateral reaching graded at various heights/distances to engage core. Requires CGA-Mod A for dyn sitting balance. Rest breaks throughout. Lifted back to supine via liko lift at end of session.   OT Discharge Planning   OT Plan OT: LLE NWB, extreme pain and does not kolby neutral (rests in ext rot), Rx: coRx w/PT to focus on supine to sit EOB, core and UE ex in sitting. Use of lift to reposition back to bed. Pt has 4# weight and green tband in room for UB HEP   OT Brief overview of current status OT: Similar session as yesterday, focused on EOB core/UB strengthening and upright unsupported sitting posture. Pt continues to require Ax2 for EOB sitting.   Total Session Time   Timed Code Treatment Minutes 40   Total Session Time (sum of timed and untimed services) 40   Post Acute Settings Only   What unit is patient on? TCU       "

## 2023-03-09 NOTE — PROGRESS NOTES
12/28/22 1030 12/29/22 0901   Appointment Info   Signing Clinician's Name / Credentials (PT)  --  Azeb Quinonez, ULISSES   Rehab Comments (PT)  --  PT: - 20 d/t nursing, pt needing bed pan   Therapeutic Activity   Therapeutic Activities: dynamic activities to improve functional performance Minutes (37787) 25 25   Treatment Detail/Skilled Intervention  --  PT: time spent discussing functional mobility and how pt plans to d/c home. He has 7 steps once inside to floor with bedroom, it has a bathroom but he said it is not set up for him. 7 steps down to bathroom but could not be in bedroom there. Says SO will say no to commode and bed on main level. Discussed slideboard for transfers and shower chair for stairs. Also for pt to start thinking about home vs [facility]. Pt agreeable to come to gym tomorrow for attempt at sit to stand.   PT Discharge Planning   PT Plan  --  PT: liko transfer to wc, could try  bars NWB LLE, consider SB transfers.  RN to order Agiliti recliner for pt.  Recliner wc placed in room.   PT Discharge Recommendation (DC Rec)  --  home with home care physical therapy;home with assist;Long term care facility  (care home vs home depends on pt progress, he is dependent right now)   PT Rationale for DC Rec  --  PT: pt is not mobile at this point, will likely be NWB at discharge and has stairs he needs to perform daily to be in his home   PT Brief overview of current status  --  PT: dependent bed mobility and transfers, limited by size, shape, weakness and pain.   Total Session Time   Timed Code Treatment Minutes 45 25   Total Session Time (sum of timed and untimed services) 45 25   Post Acute Settings Only   What unit is patient on?  --  TCU

## 2023-03-09 NOTE — PROGRESS NOTES
01/11/23 1320   Appointment Info   Signing Clinician's Name / Credentials (OT) Aga Reddy, OTR/L CBIS   Rehab Comments (OT) OT : coRx w/ PT,   Therapeutic Activities   Therapeutic Activity Minutes (76673) 45   Treatment Detail/Skilled Intervention oT: CoRx w/PT , handling and problem solving skills needed by both therapies due to pt's complex mobility situation, pt reported more pain thna previous days w/ and w/out movment, unable to identify what may be contributing to increased pain but willing to go to therapy dept to partic in standing activitiy, depend transfer bed>< w/c w/ mech lift, repeated attempts to place sling and support LLE in tolerable position to transition between surfaces, OT/PT assisted pt w/ prob solving approaches to reposition in prep for standing that was tolerable and consistent w/ LLE NWB, pt required assist of 3 staff (OT/PT/rehab tech) for STS once pt scooted R hip forward to allow R foot contact w/ floor while th suuported LLE in position to limit pain, required increased time for transitional movments and moaning w/ pain, pt kolby standing in parellel bars x2 w/ assist of 3,first stand >30 sec, 2nd stand did not achieve complete upright position due to pain.   OT Discharge Planning   OT Plan oT: LLE NWB, extreem pain and does not kolby neurtral (rests in ext rot), Rx: coRx w/PT to focus on standing in parellel bars , (refer to previous notes for approach)   OT Discharge Recommendation (DC Rec)   ([facility] 24/7 assist needed due to pain, large habitus and limited mobility, antic need for hosp bed, bariatric w/c, mechanical lift)   OT Brief overview of current status OT: pt demo good effort but increased pain L hip today limited performance.   Total Session Time   Timed Code Treatment Minutes 45   Total Session Time (sum of timed and untimed services) 45   Post Acute Settings Only   What unit is patient on? TCU

## 2023-03-09 NOTE — PROGRESS NOTES
01/02/23 0900   Appointment Info   Signing Clinician's Name / Credentials (PT) Chyna Ramirez DPT   Therapeutic Activity   Therapeutic Activities: dynamic activities to improve functional performance Minutes (87199) 30   Treatment Detail/Skilled Intervention PT; Goal of session was bringing into upright recliner. A x 2 for liko lift. Pt resists rolling, extra care taken for lifting legs to don sling. Pt has soiled pads with urine and declines rollingt o be formally clean. Lifted pt to chair. With legs 50% supported with foot rests, pt performs AROM of B LE with education and allowing himself to remain in excessive ER while in bed.   PT Discharge Planning   PT Plan PT: liko transfer A of 2, A for LLE, pad caridad area, get hips back in wc and assess fit of wc.  wc mobility, could try stand NWB LLE in bars. Recliner now in room.   PT Discharge Recommendation (DC Rec) home with home care physical therapy;home with assist;Long term care facility   PT Rationale for DC Rec PT: pt is not mobile at this point, will likely be NWB at discharge and has stairs he needs to perform daily to be in his home   PT Brief overview of current status PT: dependent bed mobility and transfers, limited by size, shape, weakness and pain.   Total Session Time   Timed Code Treatment Minutes 30   Total Session Time (sum of timed and untimed services) 30

## 2023-03-09 NOTE — PROGRESS NOTES
"   01/06/23 7961   Appointment Info   Signing Clinician's Name / Credentials (PT) Macy Florence PTA   PT Assistant Visit Number 4   Therapeutic Activity   Therapeutic Activities: dynamic activities to improve functional performance Minutes (40013) 45   Treatment Detail/Skilled Intervention PT: 45 min co-tx w/OT to focus on bed mobility and sitting at EOB tolerance for improved participation in sessions and to decrease caregiver burden. Sup < sit at EOB, HOB raised and w/use of strap at foot of bed for pulling to upright posture. max A at LLE throughout session, min/mod at trunk. v/c for sequencing and max encourgement for scooting to EOB. Pt required extensive time to perform this task. To address strength deficits for functional activities, v/c for performing BLE AP, ankle circles for 1 set x 10 reps, RLE seated marches for 1 bout x 10 reps. OT performed reaching activities outside STEVEN for improved trunk/core activation. attempted sit > sup, but Pt only able to minimally move body retro and lateral to return to supine w/HOB raised. max A x 3 to return to supine in bed then use of trendelenburg to use draw sheet w/A x 3 to scoot Pt to HOB. Positioning w/pillows for pain management and skin protection. Pt reports using a ceiling lift and sling for toileting and all activities. Pt also reported not taking pain meds \"I don't need them\". Encouraged to take pain meds prior to session as Pt has repeatedly self limited d/t pain levels, but Pt stated \"I don't knew when your are coming\". Yesterday and today's therapy schedules were on board and times were correct, but Pt continued to be resistant. OT discussed w/Pt to trial w/c in tomorrow's session, PT encouraged Pt to work on finding good fit for w/c to partiicpate in therapy sessions in gym. Pt stated \"good luck with that\".   PT Discharge Planning   PT Plan PT: Co-tx w/OT, find w/c that Pt does not feel like they are \"sliding out of\" so Pt can begin to participate in " therapy sessions in gym.   PT Discharge Recommendation (DC Rec) Long term care facility  ([facility])   PT Rationale for DC Rec PT: pt is not mobile at this point, will likely be NWB at discharge and has stairs he needs to perform daily to be in his home   PT Brief overview of current status PT: dependent bed mobility and transfers, limited by size, shape, weakness and pain. Self limiting but blames soft bed, uncomfortable w/c, arms to high on recliner.....   Total Session Time   Timed Code Treatment Minutes 45   Total Session Time (sum of timed and untimed services) 45   Post Acute Settings Only   What unit is patient on? TCU

## 2023-03-09 NOTE — PROGRESS NOTES
01/13/23 1416   Appointment Info   Signing Clinician's Name / Credentials (OT) FREEMAN Bustos   Self-Care/Home Management   Self-Care/Home Mgmt/ADL, Compensatory, Meal Prep Minutes (26145) 45   Treatment Detail/Skilled Intervention Co-tx with PT.  Pt sitting in recliner for past 45 min or so.  He agreed to be lifted to the EOB and work on core/UE ex.  Pt intermittently c/o L hip pressure but did not rate hip pain during tx.  Focus was with support from the back and guarding pt in front and providing cues, pt worked on wt shifting, modified sit ups and reaching.  He did portions of tasks with the support of his own UE's and at times needed handhold A B hands.  He tends to shift or lean away from L hip but is able to sit midline at times. Lift was used to reposition pt back into bed.   OT Discharge Planning   OT Plan oT: LLE NWB, extreme pain and does not kolby neutral (rests in ext rot), Rx: coRx w/PT to focus on supine to sit EOB, core and UE ex in sitting. Use of lift to reposition back to bed. Pt has 4# weight and green tband in room for UB HEP   OT Brief overview of current status Overall good tolerance to EOB today, to challenge pt with core and UE ex.  Pt and therapists feel his trial of standing in the parrallel bars was good effort but focus will switch to core and UE ex seated EOB.  He can work on supine to sit and use of lift prn.  Pt verb understanding re: shifting his own wt when he is up in the recliner and how he can do his own UE exercise with tband and wt in room.   Total Session Time   Timed Code Treatment Minutes 45   Total Session Time (sum of timed and untimed services) 45   Post Acute Settings Only   What unit is patient on? TCU

## 2023-03-09 NOTE — PROGRESS NOTES
"   01/09/23 1500   Appointment Info   Signing Clinician's Name / Credentials (PT) Chyna Ramirez DPT   Rehab Comments (PT) PT: - 10 minutes due to low tolerance, pain   Therapeutic Activity   Therapeutic Activities: dynamic activities to improve functional performance Minutes (72363) 35   Treatment Detail/Skilled Intervention PT: Co-Treat with OT for the purposes of getting pt up out of bed and into // bars with standing. This author's specific role was skilled therapeutic cuing and intervention for participation in transfer, positioning of L hip and standing in // bars. Similar set up and occurence as 1/7 (see flowsheet note), Liko lift from bed <> wc. Increased time this date with positioning in chair and sling given discomfort through hips. Pt has increased difficulty with moving to Edge of seat due to being lifted too far to the back of chair. Once at edge, Mod  A x 3 required for standing due to pt's habitus and poor motor control. Pt stands ~ 15 seconds each time, x 2 times totoal. Pt states \"my L hip locks up\" each time that he sits, difficulty controlling position upon sit. Declines third attmempt due to pain.   PT Discharge Planning   PT Plan PT: Co-tx w/OT, Continue with attempting to  // bars, any other functional exercise that is tolerated   PT Discharge Recommendation (DC Rec) Long term care facility   PT Rationale for DC Rec PT: pt is not mobile at this point, will likely be NWB at discharge and has stairs he needs to perform daily to be in his home   PT Brief overview of current status PT: dependent bed mobility and transfers, limited by size, shape, weakness and pain. Self limiting but blames soft bed, uncomfortable w/c, arms to high on recliner.....   Total Session Time   Timed Code Treatment Minutes 35   Total Session Time (sum of timed and untimed services) 35   Transfers: Sit to Stand   Patient Performance Total dependence (helper does ALL of the effort)   Staff Performance Two +person " assist (two plus persons physical assist)   Describe Performance PT: A x 3 in // bars   Transfers: Chair/Bed transfers   Patient Performance Total dependence (helper does ALL of the effort)   Staff Performance Two +person assist (two plus persons physical assist)   Equipment Used Universal sling

## 2023-03-09 NOTE — PROGRESS NOTES
12/27/22 8833   Appointment Info   Signing Clinician's Name / Credentials (PT) Yareli Colon PT   Rehab Comments (PT) Cont to schedule with therapist for ongoing assessment.   Therapeutic Activity   Therapeutic Activities: dynamic activities to improve functional performance Minutes (20619) 45   Symptoms Noted During/After Treatment Increased pain;Fatigue   Treatment Detail/Skilled Intervention PT: with 2nd assist of NA, performed bed mobility training and assist with focus on attempted supine to sit and sit EOB to transfer to bedside commode via areli stedy. Commode and SS obtained.  Pt. initated session with report of imminent BM and preference for commode if at all possible. mod A of 2 slow repositioning of bilat LE to edge of bed whiel pt assisting with upper body.  Pain in Lhip limiting amount and direction of motion. Pt receptive to suggestionos and cues including those for breathing/exhale on pain effort.  Achieved angled sit at EOB and feet on platform of SS, but encountered difficulty getting kneeds together  and then  unable to place posterior paddles correctioly d/t posterior soft tissue.  Able to position pt safetly on bed , recline and then with extended time and effort, reposition sling for lift into position on bed where pt used bedpan.   PT Discharge Planning   PT Plan PT: Supine LE A/AAROM to improve bed mobility tolerance; AARolling, Supine <>Sit with assist to legs, gabino L.all with focus on improving tolerance and activ participation in preparation for bed to commode transfer via areli stedy (wide?), standing pole, or sliding board.  UE ther ex for extension/scap depression.   PT Brief overview of current status PT: dependent bed mobility and transfers, limited by size, shape, weakness and pain.   Total Session Time   Timed Code Treatment Minutes 45   Total Session Time (sum of timed and untimed services) 45   Toilet Hygiene   Complete independence Toileting Task no   Describe performance bedpan  via lift   Environmental assistance for toileting task Clean-up assistance following the activity   Physical assistance to complete Toileting Task Total assistance for toileting, patient requires assistance for all three parts of task (staff does up to 99%)   Toilet Transfer   Complete independence with getting on and off a toilet or commode no   Describe performance sling lift for bedpan   Environmental assistance for getting on and off a toilet or commode Clean-up assistance following the activity   Physical assistance for getting on and off a toilet or commode Requires total assistance for getting on and off the toilet/commode, staff provides lifting and lowering assist, 76%-99% assist provided by staff   Chair/bed-to-chair Transfer   Complete independence for chair-bed-to-chair transfer no   Describe performance sling lift for bedpan   Environmental assistance for getting from chair-bed-to-chair Clean-up assistance following the activity   Physical assistance for getting from chair-bed-to-chair Requires total assistance for getting from chair-bed-to-chair, staff provides lifting and lowering assist, 76%-99% assist provided by staff   Roll Left and Right   Physical Assistance Level 50%-74%   Comment pain LLE with any movement   Roll Left to Right CARE Score 2   Sit to Lying   Physical Assistance Level 75% or more   Comment support for LLE with difficulty   Sit to Lying CARE Score 2   Lying to Sitting on Side of Bed   Physical Assistance Level 75% or more   Comment with difficulty   Lying to Sitting CARE Score 2   Sit to Stand   Reason if not Attempted Medical concerns   Sitting to Standing CARE Score 88   Walk 10 Feet   Reason if not Attempted Medical concerns   Walk 10 Ft. CARE Score 88   Walk 150 Feet   Reason if not Attempted Medical concerns   Walk 150 Ft. CARE Score 88   Walking 10 Feet on Uneven Surfaces   Reason if not Attempted Medical concerns   Walking 10 Feet on Uneven Surfaces CARE Score 88   Wheel 50  Feet with Two Turns   Reason if not Attempted Safety concerns   Wheel 50 Feet with Two Turns CARE Score 88   Wheel 150 Feet   Reason if not Attempted Safety concerns   Wheel 150 Feet CARE Score 88   1 Step (Curb)   Reason if not Attempted Safety concerns   1 Step CARE Score 88   4 Steps   Reason if not Attempted Safety concerns   4 Steps CARE Score 88   12 Steps   Reason if not Attempted Safety concerns   12 Steps CARE Score 88   Picking Up Object   Reason if not Attempted Safety concerns    CARE Score 88   Car Transfer   Reason if not Attempted Safety concerns   Car Transfer CARE Score 88

## 2023-03-09 NOTE — PROGRESS NOTES
"   01/07/23 1100   Appointment Info   Signing Clinician's Name / Credentials (PT) Chyna Ramirez DPT   Therapeutic Activity   Therapeutic Activities: dynamic activities to improve functional performance Minutes (54335) 45   Treatment Detail/Skilled Intervention PT: Goal of session was to get pt into wheelchair and down to the gym and attempt to stand. It was a Co-Treat session with OT given pt's significant debility and need for coordinating skills. Increased time of session was management of pt into/out of bed including using urinal, pt is able to place without assist. Pt then requires max A for placement of slinga nd liko lift to/from chair with specific care taken to protect L LE and offload. Pt lifted into the same wheelchair but towel roll placed under seat to allow for greater hip flexion with success. Pt then brought to the gym ( L LE was supported by leg rest and pillows) A x 3 to  // bars, (Mod A x 3), necessary to coordinate pt's body habitus and pt's inability to control body forward and up over R LE. Pt does well despite being unable to fully extend through  R LE joints, able to stand twice for ~ 20 sec each time. Pt is frustrated about lack of arm strength following session, OT gives pt theraband exercises to do outside of session   PT Discharge Planning   PT Plan PT: Co-tx w/OT, find w/c that Pt does not feel like they are \"sliding out of\" so Pt can begin to participate in therapy sessions in gym.   PT Discharge Recommendation (DC Rec) Long term care facility   PT Rationale for DC Rec PT: pt is not mobile at this point, will likely be NWB at discharge and has stairs he needs to perform daily to be in his home   PT Brief overview of current status PT: dependent bed mobility and transfers, limited by size, shape, weakness and pain. Self limiting but blames soft bed, uncomfortable w/c, arms to high on recliner.....   Total Session Time   Timed Code Treatment Minutes 45   Total Session Time (sum " of timed and untimed services) 45   Transfers: Sit to Stand   Patient Performance Total dependence (helper does ALL of the effort)   Staff Performance Two +person assist (two plus persons physical assist)   Describe Performance PT; see above

## 2023-03-09 NOTE — PROGRESS NOTES
"   01/12/23 6208   Appointment Info   Signing Clinician's Name / Credentials (OT) SHIRA Stover/L   OT Assistant Visit Number 7   Therapeutic Activities   Therapeutic Activity Minutes (23703) 55   Treatment Detail/Skilled Intervention BRII: Co-tx with PT for skilled intervention to progress functional mobility/transfers. Pt lifted to w/c via liko lift Ax2. Taken to PT gym. Pt requires Ax1 for LLE management and Ax1 for managing sling/lift with all transfers. Pt setup at parallel bars to initiate standing. PT working on lowering LLE as tolerated in order to work towards functional sitting position with RLE grounded. While doing this, pt hips sliding forward in chair. Pt reports \"unsure of why this is beneficial standing in bars\". Educated pt on RLE/core strengthening and working towards functional transfers. Discontinued bars d/t risk of sliding in chair. Attempted EOM sitting via golvo lift. Pt required Ax3 w/c <> EOM yet unable to tolerate EOM sitting as pt continued to slide and pain increasing. Setup patient at [facility] for tricep strengthening to carryover for functional mobility. Pt tolerated 2 sets x15 reps with 15#. Cues for form. Vicente provided pt with 4# dumb bell. Pt educated and demod exercises he can complete in room indl'y. Pt demos understanding.   OT Discharge Planning   OT Plan oT: LLE NWB, extreme pain and does not kolby neurtral (rests in ext rot), Rx: coRx w/PT to focus on standing in parallel bars, EOB sitting, [facility] 15-20#, pt has 4# weight in room for UB HEP   OT Brief overview of current status OT: Pt declined/unable to stand today d/t positioning in w/c unsafe and pt wanting to work on other things (ub strengthening). Pt unable to tolerate sitting EOM d/t hip pain and positoining/weakness   Total Session Time   Timed Code Treatment Minutes 55   Total Session Time (sum of timed and untimed services) 55   Post Acute Settings Only   What unit is patient on? TCU       "

## 2023-03-09 NOTE — PLAN OF CARE
Goal Outcome Evaluation:         Tried to get an early appointment earlier than 3/20/23 but there is no available opening.

## 2023-03-09 NOTE — PROGRESS NOTES
12/30/22 1116   Appointment Info   Signing Clinician's Name / Credentials (PT) Tisha Pierre, PT, DPT   Rehab Comments (PT) pt with pain L hip with liko transfer to BSC, could not position somfortably on BSC with PT and A.  -10 needing bedpan, unable to tolerate BSC.   Therapeutic Procedure/Exercise   Ther. Procedure: strength, endurance, ROM, flexibillity Minutes (81709) 10   Treatment Detail/Skilled Intervention PT: L LE knee flex, ext with a, very gently, slowly due to pt's pain L hip.  Pt with very little active movement of LLE, small range knee ext/flex aaorm, aps x 20 B LEs, GS x 20. Tech available to help with tx to wc, but pt then stating he wants the commode.  A of 2 to commode with liko,very slow movement, padding caridad area, and pt with pain L hip, support for LLE.  Pt could not get positioned well on estephania comode and was then lifted tobed, and needing to be repositioned for comfort, then dslowly lifted again with support for LLE whil placing bedpan, lifting w/liko, pt unable to roll. . Pt given call light.   Therapeutic Activity   Therapeutic Activities: dynamic activities to improve functional performance Minutes (74098) 25   Treatment Detail/Skilled Intervention PT; time spent finding a recliner for pt, and bringing into room.  Pt encouraged ot get OOB, try wc again, see if we can improve sitting posture in wc. see above.   PT Discharge Planning   PT Plan PT: liko transfer A of 2, A for LLE, pad caridad area, get hips back in wc and assess fit of wc.  wc mobility, could try stand NWB LLE in bars. Recliner now in room.   Total Session Time   Timed Code Treatment Minutes 35   Total Session Time (sum of timed and untimed services) 35

## 2023-03-09 NOTE — PROGRESS NOTES
12/31/22 0491   Appointment Info   Signing Clinician's Name / Credentials (OT) Aga Reddy, OTR/L CBIS   Rehab Comments (OT) OT: (-22 min due to pain/nausea), daniella sh AAROM/neck ROM,scap mob   Therapeutic Procedures/Exercise   Therapeutic Procedure: strength, endurance, ROM, flexibillity minutes (94375) 23   Treatment Detail/Skilled Intervention oT: declined adls or mobiity due to c/o severe neck pain and nausea, reported vomiting earlier today, agreeable to daniella ue AAROM , daniella gentle scap mobility, AROM neck exer w/ minimal movement beyond midline w/ rotation,flex, lateral flex either side . nsg aware, th provided pt w/ heat packs for L side of neck /upper traps for comfort, pt's RN and charge RN aware and ok'd pt to use hot packs.   OT Discharge Planning   OT Plan OT: bed mobility, discuss plan for toileting (platform BSC in room), trial recliner (delivered to room today), UB strengthening   OT Discharge Recommendation (DC Rec) home with home care occupational therapy;Per plan established by the OT;Collaborated with OT for updated discharge location   OT Brief overview of current status OT:pt declined bed mob and adls due to nausea and neck pain, focused on scapular and sh ROM and neck stretch/movmeents, discussed POC and goals, reports SO not able to assist pt so pt needs to be fairly indep to return home.   Total Session Time   Timed Code Treatment Minutes 23   Total Session Time (sum of timed and untimed services) 23   Post Acute Settings Only   What unit is patient on? TCU

## 2023-03-09 NOTE — PROGRESS NOTES
"   12/29/22 5920   Appointment Info   Signing Clinician's Name / Credentials (OT) SHIRA Stover/L   OT Assistant Visit Number 2   Self-Care/Home Management   Self-Care/Home Mgmt/ADL, Compensatory, Meal Prep Minutes (71141) 75   Treatment Detail/Skilled Intervention BRII: Pt declines ADLs except for oral cares/face washing from w/c level. Extensive time required for liko lift bed > w/c d/t managing LLE/pain in LLE. Pt slightly slumped in w/c, attempted to reposition with Ues yet unable. Pt declines writer to call for assist x2 in order to properly reposition pt in chair to promote comfort \"let's just get out of the room\". Pt can be selective with what he will trial. Pt resistant towards trialing UB strengthening/core/trunk activity d/t positioning in chair. Oriented pt to OT/PT gym and unit via w/c while discussing goals/POC. Pt frequently required writer to reposition LLE for comfort throughout this time. Pt is able to use urinal independently with setup. Has not attempted BSC, sitting in room. Plan to trial bed mobility, platform BSC and recliner within future OT sessions. Pt tolerated a total of approx 45 min in w/c. Noted- pt has not been rolling with nursing- reports using lift to raise up/down for caridad cares/placement of bedpan.   OT Discharge Planning   OT Plan OT: bed mobility, discuss plan for toileting (platform BSC in room), trial recliner (delivered to room today), UB strengthening   OT Brief overview of current status OT: Pain in LLE limiting mobility and at times pt selective with what he will trial. Pt tolerated 45 min up in w/c today.   Total Session Time   Timed Code Treatment Minutes 75   Total Session Time (sum of timed and untimed services) 75   Post Acute Settings Only   What unit is patient on? TCU   Transfers: Chair/Bed transfers   Describe Performance BRII: Total Ax2 liko lift bed <> w/c   Toileting Hygiene   Describe Performance BRII: Setup urinal   Hygiene/Grooming   Describe Performance " BRII: Setup face washing from w/c level   Oral Hygiene   Describe Performance BRII: Setup oral cares from w/c   Bathing   Describe Performance BRII: Pt declined sponge bathing; discussed potential for future shower as pt progresses

## 2023-03-09 NOTE — PROGRESS NOTES
01/02/23 1020   Appointment Info   Signing Clinician's Name / Credentials (OT) SHIAR Stover/L   OT Assistant Visit Number 4   Self-Care/Home Management   Self-Care/Home Mgmt/ADL, Compensatory, Meal Prep Minutes (62659) 10   Treatment Detail/Skilled Intervention BRII: Pt seated in recliner upon arrival with LEs fully elevated and head reclined. Pt agrees to sitting upright yet reports unable to lower any further when approx custodial down. Setup oral cares and light face level g/h from his position.   Therapeutic Activities   Therapeutic Activity Minutes (38389) 30   Treatment Detail/Skilled Intervention BRII: Pt continues to c/o neck pain while in recliner. Discussed and guided gentle stretches for neck. Time spent educating pt on positioning in chair to promote activity tolerance and functional sitting position in order to progress ADL performance. Pt gives minimal effort with activity. Setup tabletop game to promote daniella UE reach/core engagement graded at various heights/distances.   OT Discharge Planning   OT Plan OT: bed mob, discuss plan for toileting, core/UE strengthening, act tolerance   OT Discharge Recommendation (DC Rec) home with home care occupational therapy;home with assist  (unsure if pt will be able to return home d/t NWB restriction/stairs/assist level)   OT Brief overview of current status OT: Pt performance limited by low motivation, c/o pain in neck and LLE, and low activity tolerance. No functional progress has been made yet.   Total Session Time   Timed Code Treatment Minutes 40   Total Session Time (sum of timed and untimed services) 40   Post Acute Settings Only   What unit is patient on? TCU

## 2023-03-09 NOTE — PLAN OF CARE
Goal Outcome Evaluation:  No acute issues during this shift. Patient seen by provider today ( see notes) . With new order for BG checks 2x/ day and to offer patient to sit in chair once a day , declined during this shift. Ate supper, consumed 100%, BG prior to meal: 92mg/dl. No complaint of pain or muscle spasms to left hip. Refusing repositioning and full skin assessment.Had large loose BM during this shift.  Comfortable at this time. To continue POC.       Patient's most recent vital signs are:     Vital signs:  BP: 139/66  Temp: 95.9  HR: 70  RR: 16  SpO2: 96 %     Patient does not have new respiratory symptoms.  Patient does not have new sore throat.  Patient does not have a fever greater than 99.5.

## 2023-03-09 NOTE — PROGRESS NOTES
01/13/23 2077   Appointment Info   Signing Clinician's Name / Credentials (PT) Azeb Quinonez DPT   Rehab Comments (PT) PT: co treat with OT   Therapeutic Activity   Therapeutic Activities: dynamic activities to improve functional performance Minutes (42826) 45   Treatment Detail/Skilled Intervention PT: co treat with OT, two skilled providers required for positioning, trouble shooting hip pain. Pt liko lift x2 with towels placed at front to pad between thighs. Pt requests slow, sequential lift . Pt placed EOB one therapist behind, one in front. Verbal cues for sitting midline and supporting himself. Th behind offers SBA to occasional CGA to prevent posterior lean. Th in front has pt perform lateral side to side focusing on core, small sit ups within pt's control, reaching across STEVEN, twists at shoulder. Pt requires rest breaks between activities. Lifted back to supine using liko lift A x 2 from EOB.   PT Discharge Planning   PT Plan PT: co treat with OT, EOB for core and sitting balance. Review UE exercises, readdress standing as strength improves   PT Discharge Recommendation (DC Rec) Long term care facility   PT Rationale for DC Rec PT: pt is not mobile at this point, will likely be NWB at discharge and has stairs he needs to perform daily to be in his home   PT Brief overview of current status PT: dependent bed mobility and transfers, limited by size, shape, weakness and pain. Self limiting but blames soft bed, uncomfortable w/c, arms to high on recliner.....   Total Session Time   Timed Code Treatment Minutes 45   Total Session Time (sum of timed and untimed services) 45   Post Acute Settings Only   What unit is patient on? TCU   Bed Mobility: Sit to lying   Patient Performance Total dependence (helper does ALL of effort)   Staff Performance Two +person assist (two plus persons physical assist)   Describe Performance PT: liko lift A x 2   Transfers: Chair/Bed transfers   Patient Performance Total dependence  (helper does ALL of the effort)   Staff Performance Two +person assist (two plus persons physical assist)   Equipment Used Universal sling   Describe Performance PT: shamekao lift A x 2

## 2023-03-09 NOTE — PLAN OF CARE
0700H-2300H  Goal Outcome Evaluation:  No recent changes, no acute issues during this shift. Patient skips breakfast, had Ensure in AM after encouraged, BG prior to this: 105, all AM meds given after. Patient sleeps more during daytime and stays up at night per report. Encouraged to order food and get up, sit on recliner for lunch, refused.Had supper,sat on recliner , but requested to get back in bed after 30 mins. Had large loose BM during this shift.  Comfortable at this time. To continue POC.       Patient's most recent vital signs are:     Vital signs:  BP: 130/65  Temp: 97.4  HR: 81  RR: 16  SpO2: 95 %     Patient does not have new respiratory symptoms.  Patient does not have new sore throat.  Patient does not have a fever greater than 99.5.

## 2023-03-09 NOTE — PROGRESS NOTES
01/10/23 1100   Appointment Info   Signing Clinician's Name / Credentials (PT) alicia Trevizo   PT Assistant Visit Number 5   Rehab Comments (PT) PT -5 fatigue   Therapeutic Procedure/Exercise   Ther. Procedure: strength, endurance, ROM, flexibillity Minutes (52221) 40   Treatment Detail/Skilled Intervention PT: pt supine at start of PT session, this pt well known by writer from [facility]. pt demo supine TE for RLE and assist of resistive hip/knee extension. AAROM to L LE. limited by pain. theraputic listening from pt status. pt has PT/OT session this PM for trail of STS in // bars.   PT Discharge Planning   PT Plan PT: Co-tx w/OT, Continue with attempting to  // bars, any other functional exercise that is tolerated   PT Discharge Recommendation (DC Rec) Long term care facility   PT Rationale for DC Rec PT: pt is not mobile at this point, will likely be NWB at discharge and has stairs he needs to perform daily to be in his home   PT Brief overview of current status PT: dependent bed mobility and transfers, limited by size, shape, weakness and pain. Self limiting but blames soft bed, uncomfortable w/c, arms to high on recliner.....   Total Session Time   Timed Code Treatment Minutes 40   Total Session Time (sum of timed and untimed services) 40

## 2023-03-09 NOTE — PLAN OF CARE
Goal Outcome Evaluation:    Patient alert and oriented per baseline,denied pain,CP or SOB. Declined repositioning, uses urinal independently with staff emptying. No concerns during shift, continue with POC.      Patient's most recent vital signs are:     Vital signs:  BP: 139/66  Temp: 95.9  HR: 70  RR: 16  SpO2: 96 %     Patient does not have new respiratory symptoms.  Patient does not have new sore throat.  Patient does not have a fever greater than 99.5.

## 2023-03-09 NOTE — PROGRESS NOTES
12/27/22 1217   Appointment Info   Signing Clinician's Name / Credentials (OT) BRII Paul   OT Assistant Visit Number 1   Rehab Comments (OT) OT: pt. declined scheduled shower due to vomiting from medication and not feeling well. Agreeable to UE exercise   Therapeutic Procedures/Exercise   Therapeutic Procedure: strength, endurance, ROM, flexibillity minutes (05319) 30   Treatment Detail/Skilled Intervention OT: Pt. participated in UE strengthening with HOB elevated. 5# dowel exercises completing 15-20 reps x 10 exercises. Pt. tolerated exercises well.   OT Discharge Planning   OT Plan OT: Sponge  bath in bed, UE strengthening, discuss plan for toileting   Total Session Time   Timed Code Treatment Minutes 30   Total Session Time (sum of timed and untimed services) 30   Post Acute Settings Only   What unit is patient on? TCU

## 2023-03-09 NOTE — PROGRESS NOTES
12/28/22 1400   Appointment Info   Signing Clinician's Name / Credentials (OT) FREEMAN Bustos   Appointment Canceled Reason (OT) At procedure/test

## 2023-03-09 NOTE — PROGRESS NOTES
01/14/23 2469   Appointment Info   Signing Clinician's Name / Credentials (PT) Azeb Quinonez DPT   Rehab Comments (PT) PT: co treat with OT   Therapeutic Activity   Therapeutic Activities: dynamic activities to improve functional performance Minutes (68291) 40   Treatment Detail/Skilled Intervention PT: co treat with OT, two skilled providers required for positioning, trouble shooting hip pain. Pt liko lift x2 with towels placed at front to pad between thighs from supine to EOB. Pt requests slow, sequential lift . Pt placed EOB one therapist behind, one in front. Verbal cues for sitting midline and supporting himself. Th behind offers SBA to occasional CGA to prevent posterior lean. Th in front has pt perform lateral side to side focusing on core, small sit ups within pt's control, reaching across STEVEN, head up, scap restraction. Pt requires rest breaks between activities. Lifted back to supine using liko lift A x 2 from EOB.   PT Discharge Planning   PT Plan PT: co treat with OT, EOB for core and sitting balance. Review UE exercises, readdress standing as strength improves   PT Discharge Recommendation (DC Rec) Long term care facility   PT Rationale for DC Rec PT: pt is not mobile at this point, will likely be NWB at discharge and has stairs he needs to perform daily to be in his home   PT Brief overview of current status PT: dependent bed mobility and transfers, limited by size, shape, weakness and pain. Self limiting but blames soft bed, uncomfortable w/c, arms to high on recliner.....   Total Session Time   Timed Code Treatment Minutes 40   Total Session Time (sum of timed and untimed services) 40   Post Acute Settings Only   What unit is patient on? TCU   Bed Mobility: Sit to lying   Patient Performance Total dependence (helper does ALL of effort)   Staff Performance Two +person assist (two plus persons physical assist)   Describe Performance PT: liko lift A x 2   Bed Mobility: Lying to sitting on the side of  bed   Patient Performance Total dependence (helper does ALL of the effort)   Staff Performance Two +person assist (two plus persons physical assist)   Describe Performance PT: shamekao lift A x 2

## 2023-03-09 NOTE — PROGRESS NOTES
12/28/22 1030   Appointment Info   Signing Clinician's Name / Credentials (PT) Tisha Pierre, PT, DPT   Therapeutic Procedure/Exercise   Ther. Procedure: strength, endurance, ROM, flexibillity Minutes (78634) 20   Treatment Detail/Skilled Intervention PT: supine ex - APs x 20, heelslides RLE x 15, L knee felx/ext with aarom per tolerance x 12, GS x 10, hip abd/add RLE x 20 aarom, min cues for technique  seated ex while up EOB NWB LLE.   scap retraction, protraction x 10 x 2 sets, aps x 20, R hams stretch with A, laq R x 10 x 2 sets, march small range RLE x 15 to fatigue.   Therapeutic Activity   Therapeutic Activities: dynamic activities to improve functional performance Minutes (29069) 25   Symptoms Noted During/After Treatment Fatigue   Treatment Detail/Skilled Intervention Tx   to EOB with A of 2, sitting EOB for activity tolerance, core strengthening , sitting up about 13 minutes EOB.   PT Discharge Planning   PT Plan PT: liko transfer to wc, could try  bars NWB LLE, consider SB transfers.  RN to order Agiliti recliner for pt.  Recliner wc placed in room.   Total Session Time   Timed Code Treatment Minutes 45   Total Session Time (sum of timed and untimed services) 45   Sit to Lying   Comment PT: A of 2 , A for LLE, trunk, needs A of 2 trrende;enberg to get up in bed.   Lying to Sitting on Side of Bed   Comment PT: A of 2 , max A, hob raised, rail, multiple scoots, increased time   Sit to Stand   Reason if not Attempted Medical concerns   Sitting to Standing CARE Score 88

## 2023-03-09 NOTE — PROGRESS NOTES
01/05/23 1417   Appointment Info   Signing Clinician's Name / Credentials (OT) SHIRA Stover/L   OT Assistant Visit Number 6   Self-Care/Home Management   Treatment Detail/Skilled Intervention BRII: Care conference held with patient, patient significant other, SW, PT/OT. Provided current OT update and barriers to performance/progress. Recommend for 24/7 supervision and Ax1 for ADLs. Currently pt requires Ax2 for transfers.   OT Discharge Planning   OT Plan OT: bed mob, discuss plan for toileting, core/UE strengthening, act tolerance, functional sitting position in recliner for ADLs, coRx w/PT   OT Brief overview of current status Care conference. See Self-Care section.   Post Acute Settings Only   What unit is patient on? TCU

## 2023-03-10 NOTE — PLAN OF CARE
Patient is alert and oriented  x 4. Able to communicate needs. VSS on RA. Denies any cough, chest pain, SOB, dificulty breathing, lightheadedness or dizziness. No nausea or vomiting. A2 likolift. NWB LLE. Denies any chills or fever. Regular diet, thin liquids. Continent of bowel & bladder. Robaxin & tylenol given x 1 for pain managment. Call light in reach.     Patient's most recent vital signs are:     Vital signs:  BP: 141/68  Temp: 97.7  HR: 69  RR: 17  SpO2: 97 %     Patient does not have new respiratory symptoms.  Patient does not have new sore throat.  Patient does not have a fever greater than 99.5.

## 2023-03-10 NOTE — PLAN OF CARE
Goal Outcome Evaluation:    Patient alert and oriented per baseline,denied pain,CP or SOB. Declined repositioning, uses urinal independently with staff emptying. No concerns during shift, continue with POC.      Patient's most recent vital signs are:     Vital signs:  BP: 130/65  Temp: 97.4  HR: 81  RR: 16  SpO2: 95 %     Patient does not have new respiratory symptoms.  Patient does not have new sore throat.  Patient does not have a fever greater than 99.5.

## 2023-03-10 NOTE — PROGRESS NOTES
ZEB met with KENDALL from [facility].  Rose/[facility]ellissa said they could take folks with wallace lifts.    ZEB sent referral.     NEREYDA Sharp   [facility], Transitional Care Unit   Social Work   [address] [place] [address], [address] [place] [address]  (PH) [phone]

## 2023-03-10 NOTE — PLAN OF CARE
Goal Outcome Evaluation:  No recent changes during this shift.Initilally  refused to get up and sit in recliner due to stiff neck per patient, requested Tylenol PRN and Robaxin PRN effective, got up after and sat on the recliner for supper. Had large loose/formed BM this evening, had shower also.  Refused repositioning when in bed , will call when he needs to per patient. To continue POC.       Patient's most recent vital signs are:     Vital signs:  BP: 136/55  Temp: 96.5  HR: 74  RR: 17  SpO2: 94 %     Patient does not have new respiratory symptoms.  Patient does not have new sore throat.  Patient does not have a fever greater than 99.5.

## 2023-03-11 NOTE — CARE PLAN
Pt is A & O x4, able to make needs known.Continiet of B&B.  Transfers with an assist of 2 using the liko lift. Had an uneventful night. Denies SOB and CP. Call light is with in reach. Continue POC..          Patient's most recent vital signs are:     Vital signs:  BP: 136/55  Temp: 96.5  HR: 74  RR: 17  SpO2: 94 %     Patient does not have new respiratory symptoms.  Patient does not have new sore throat.  Patient does not have a fever greater than 99.5.

## 2023-03-11 NOTE — PLAN OF CARE
Patient is alert and oriented  x 4. Able to communicate needs. VSS on RA. Denies any cough, chest pain, SOB, dificulty breathing, lightheadedness or dizziness. No nausea or vomiting. A2 likolift. NWB LLE. Denies any chills or fever. Regular diet, thin liquids. Continent bladder. LBM 3/10. Robaxin & tylenol given x 1 for pain managment. Call light in reach.        Patient's most recent vital signs are:     Vital signs:  BP: 130/58  Temp: 96.5  HR: 81  RR: 18  SpO2: 96 %     Patient does not have new respiratory symptoms.  Patient does not have new sore throat.  Patient does not have a fever greater than 99.5.

## 2023-03-12 NOTE — PLAN OF CARE
Patient is alert and oriented  x 4. Able to communicate needs. VSS on RA. Denies any cough, chest pain, SOB, dificulty breathing, lightheadedness or dizziness. No nausea or vomiting. A2 likolift. NWB LLE. Denies any chills or fever. Regular diet, thin liquid.  Robaxin & tylenol given x 1 for pain managment. Call light in reach.      Patient's most recent vital signs are:     Vital signs:  BP: 133/62  Temp: 97  HR: 67  RR: 16  SpO2: 95 %     Patient does not have new respiratory symptoms.  Patient does not have new sore throat.  Patient does not have a fever greater than 99.5.

## 2023-03-12 NOTE — CARE PLAN
Pt is A & O x4. Denies SOB or CP. Able to make needs known. No acute change in patient's condition this shift. Appears to be sleeping most of the shift. Call light in reach, continue POC          Patient's most recent vital signs are:     Vital signs:  BP: 123/62  Temp: 96.1  HR: 77  RR: 18  SpO2: 94 %     Patient does not have new respiratory symptoms.  Patient does not have new sore throat.  Patient does not have a fever greater than 99.5.

## 2023-03-12 NOTE — PLAN OF CARE
Patient is alert and oriented x4. Able to make needs known to staff. Patient is continent of both bowel and bladder. Urinal within reach at bedside. Assist-2 with transfers w/ liko lift but remained in bed all shift. Call-light within reach. Will continue POC.    Patient's most recent vital signs are:     Vital signs:  BP: 123/62  Temp: 96.1  HR: 77  RR: 18  SpO2: 94 %     Patient does not have new respiratory symptoms.  Patient does not have new sore throat.  Patient does not have a fever greater than 99.5.

## 2023-03-12 NOTE — PLAN OF CARE
Alert and oriented x4. Makes needs known. Uses call-light appropriately. Continent of both bowel and bladder. Uses urinal at bedside. Requires assist-2 with transfers with Liko lift. Afebrile and denied chest pain, N&V, cough, and SOB. New sling received from [facility] and placed in room. Call-light within reach. Will continue with POC.    Patient's most recent vital signs are:     Vital signs:  BP: 133/62  Temp: 97  HR: 67  RR: 16  SpO2: 95 %     Patient does not have new respiratory symptoms.  Patient does not have new sore throat.  Patient does not have a fever greater than 99.5.

## 2023-03-13 NOTE — PROGRESS NOTES
CLINICAL NUTRITION SERVICES - REASSESSMENT NOTE     Nutrition Prescription    RECOMMENDATIONS FOR MDs/PROVIDERS TO ORDER:  Encourage oral intakes, offer/provide snacks/supplements PRN between meals    Malnutrition Status:    Moderate malnutrition in the context of acute on chronic illness    Recommendations already ordered by Registered Dietitian (RD):  Encouraged continued adequate intakes  Continue Ensure Every other day    Future/Additional Recommendations:  Monitor labs, intakes, and weight trends.     EVALUATION OF THE PROGRESS TOWARD GOALS   Diet: Regular  Snacks/supplements: Ensure Enlive every other day  Intake/Tolerance: variable, % of documented meals. Does order from outside.     Pt ordering 1-2 meals per day, averaging 1311 kcal and 53 g protein per day per [employer] and if drinking ensure every other day, pt is likely meeting 70% minimum energy and protein needs.      NEW FINDINGS/REVIEW OF SYSTEMS    Nutrition/GI: Patient with improved intakes over last week. Pt states he has not ordered outside food for a few days.  Has multiple Ensure Enlive in room. States they are being sent every other day but he does not always drink them. Encouraged pt to drink ensure if he is not getting food from outside or if he only does one other meal during the day. Patient understanding.     Weights: Pt previously down 60 lb (19%) in ~1.5 months. Pt seems weight stable since admission to TCU. With 4 lb (1.5%) weight loss over ~1 month.   Wt Readings from Last Encounters:   03/06/23 117.7 kg (259 lb 8 oz)   01/30/23 119.5 kg (263 lb 8 oz)   01/09/23 119.7 kg (264 lb)   11/22/22 147.1 kg (324 lb 4.8 oz)   11/14/22 142.9 kg (315 lb)   11/09/22 146.8 kg (323 lb 9.6 oz)   05/19/22 136.1 kg (300 lb)   07/03/18 148.9 kg (328 lb 4.8 oz)      Labs and meds reviewed    MALNUTRITION  % Intake: No decreased intake noted  % Weight Loss: Previous significant weight loss, now weight stable over 1 month.   Subcutaneous Fat Loss:  Facial region and Upper arm:  Mild-moderate  Muscle Loss: Upper arm (bicep, tricep):  Moderate, Upper leg (quadricep, hamstring):  Moderate   Fluid Accumulation/Edema: Trace to mild  Malnutrition Diagnosis: Moderate malnutrition in the context of acute on chronic illness.     Previous Goals   Patient to consume % of nutritionally adequate meal trays TID, or the equivalent with supplements/snacks.  Evaluation: Met    Previous Nutrition Diagnosis  Predicted inadequate nutrient intake (protein-energy) related to eating mostly 100% of meals documented the past week with good appetite but potential for PO intake decline.   Evaluation: No change    CURRENT NUTRITION DIAGNOSIS  Predicted inadequate nutrient intake (protein-energy) related to eating mostly 100% of meals documented the past week with good appetite but potential for PO intake decline.     INTERVENTIONS  Implementation  Nutrition education for nutrition relationship to health/disease   Continue supplement every other day + PRN    Goals  Patient to consume % of nutritionally adequate meal trays TID, or the equivalent with supplements/snacks.    Monitoring/Evaluation  Progress toward goals will be monitored and evaluated per protocol.    Dee Alanis MS, RDN, LDN  RD pager: [phone]  WB Weekend/Holiday Pager: [phone]

## 2023-03-13 NOTE — CARE PLAN
Patient is A and O x4. Able to communicate needs. Denies SOB, fever, or CP this shift. Appears to be sleeping most of the night. No new concern at this time. Call light is with in patient's reach. Continue POC.          Patient's most recent vital signs are:     Vital signs:  BP: 119/68  Temp: 95.8  HR: 78  RR: 16  SpO2: 94 %     Patient does not have new respiratory symptoms.  Patient does not have new sore throat.  Patient does not have a fever greater than 99.5.

## 2023-03-13 NOTE — PLAN OF CARE
Goal Outcome Evaluation:         Pt alert and oriented X4, able to make needs known. Uses full lift for transfers. Uses urinal for bladder and bedpan for BM. Pt did not eat breakfast, Ate lunch around 1100Am. Metformin given with meals. No c/o chest pain, SOB, N/V. Cooperative with cares. Will continue with POC.               Patient's most recent vital signs are:     Vital signs:  BP: 109/65  Temp: 96.2  HR: 76  RR: 16  SpO2: 95 %     Patient does not have new respiratory symptoms.  Patient does not have new sore throat.  Patient does not have a fever greater than 99.5.

## 2023-03-13 NOTE — PROGRESS NOTES
ZEB called Rose @ [facility] ([phone]) to see if pt's penitentiary referral was received. Rose reports they have not received this referral yet and requested it be resent (fax: [phone]. ZEB resent referral on 3/13.    ZEB received a call back from Rose - referral was received, but they are private pay for 3 years before they can accept MA/EW. She recommended [facility] in [place].    NEREYDA Shaffer  Post Acute Float   [facility]/ISABEL/JENNYFER    Phone: [phone]  Fax: [phone]

## 2023-03-13 NOTE — PROGRESS NOTES
ZEB began making group home referrals for pt. Referrals placed to:    -[facility]  -[facility]  -[facility]  -[facility]    SW spoke with Gardenia with [facility] - she reports they would have a bed available in [place], but would like to review nursing/physician notes/MAR to make sure they can accommodate pt's needs. ZEB emailed these notes to Gardenia.    ZEB also received a call from [facility], a group home in [place] (ZEB did not submit a referral to them). ZEB emailed intake back at [email or web address]    NEREYDA Shaffer  Post Acute Float   ARU/ISABEL/JENNYFER    Phone: [phone]  Fax: [phone]

## 2023-03-14 NOTE — PLAN OF CARE
Goal Outcome Evaluation:         Pt alert and oriented X4, able to make needs known. No c/o chest pain, SOB, N/V. Had a large BM. Pleasant and cooperative with cares. Will continue with POC.               Patient's most recent vital signs are:     Vital signs:  BP: 131/56  Temp: 96.7  HR: 76  RR: 16  SpO2: 94 %     Patient does not have new respiratory symptoms.  Patient does not have new sore throat.  Patient does not have a fever greater than 99.5.

## 2023-03-14 NOTE — PLAN OF CARE
0700H-2300H  Goal Outcome Evaluation:    No recent changes during this shift.Refused breakfast, ate lunch/ assisted to sit on recliner until before supper. Refused repositioning when in bed , will call when he needs to per patient.No acute issues during this shift.To continue POC.     B,108    Patient's most recent vital signs are:     Vital signs:  BP: 122/67  Temp: 96.5  HR: 68  RR: 16  SpO2: 95 %     Patient does not have new respiratory symptoms.  Patient does not have new sore throat.  Patient does not have a fever greater than 99.5.

## 2023-03-14 NOTE — PLAN OF CARE
Goal Outcome Evaluation:  No acute issues overnight. Appears sleeping well and has no c/o's or requests as of yet. Continent using urinal indep.- staff empties. LBM = lrg.yesterday.        Patient's most recent vital signs are:     Vital signs:  BP: 131/56  Temp: 96.7  HR: 76  RR: 16  SpO2: 94 %     Patient does not have new respiratory symptoms.  Patient does not have new sore throat.  Patient does not have a fever greater than 99.5.

## 2023-03-15 NOTE — PLAN OF CARE
Goal Outcome Evaluation:  No acute issues overnight. Awake during 0200 rounds - on his phone and watching TV. Had no c/o's @ that time. Continent using urinal indep.- staff emptied.        Patient's most recent vital signs are:     Vital signs:  BP: 122/67  Temp: 96.5  HR: 68  RR: 16  SpO2: 95 %     Patient does not have new respiratory symptoms.  Patient does not have new sore throat.  Patient does not have a fever greater than 99.5.

## 2023-03-15 NOTE — PLAN OF CARE
Goal Outcome Evaluation:    Patient seated in recliner at the beginning of the shift, VSS , no SOB, denies pain and discomfort.  Refused full skin assessment . Had BM this AM, none during this shift. No new changes during this shift, no new orders placed. Comfortable at this time. To continue POC.       Patient's most recent vital signs are:     Vital signs:  BP: 119/55  Temp: 95.2  HR: 74  RR: 16  SpO2: 95 % /RA    Patient does not have new respiratory symptoms.  Patient does not have new sore throat.  Patient does not have a fever greater than 99.5.

## 2023-03-15 NOTE — PROGRESS NOTES
VS: /58 (BP Location: Right arm)   Pulse 65   Temp (!) 96.1  F (35.6  C) (Oral)   Resp 16   Wt 117.7 kg (259 lb 8 oz)   SpO2 96%   BMI 37.23 kg/m       O2: 98%RA    Output: Voiding without difficulty. Urinal at BS   Last BM: 3/13   Activity: Lift    Skin: CDI    Pain: Pt denies pain at this time    CMS: A/Ox4   Dressing: CDI    Diet: Regular thin liquid    LDA: NA   Plan: Continiue with care    Additional Info:

## 2023-03-15 NOTE — PROGRESS NOTES
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 Hospital stay was c/b acute encephalopathy (attributed to anesthesia and sedating meds), acute hypoxic respiratory failure (attributed to obesity hypoventilation syndrome), acute blood loss anemia, HALEY (thought pre renal), and profound physical deconditioning. Transferred to TCU for ongoing rehabilitation. He is now awaiting community placement after not making progress here.     # Physical deconditioning - PT and OT signed off as patient unwilling to mobilize due to pain.  Had been doing several months of in-bed therapy.  SW looking for community placement.      # Chronic left hip PJI s/p explantation, debridement, and reconstruction with antibiotic spacer (11/22/22) - Surgery by Dr. Meyers.  Surgical cultures grew Finegoldia magna and Proteus mirabilis (also had C. acnes in the past but doing well despite no directed treatment of this). S/p 6 wk course of PO Cipro and Flagyl per ID ended 1/3/23 - noted potential higher risk for treatment failure given comorbidities, sinus tract, and history of PJI. Saw Dr. Meyers 1/9/23 and completed 8 wk antibiotic free holiday after which time left hip aspiration was attempted by IR on 3/6 but no fluid could be obtained (per charting, Dr. Meyers notified of this). No longer requiring pain meds.   - NWB LLE  - Continue to encourage getting up to chair at least 1x/day  - Follow up with Ortho on 3/20 to discuss second stage reimplantation surgery (but this was pending negative infectious w/u " and patient had also been encouraged to mobilize and lose weight).     # Type 2 DM - Long standing diagnosis.  Hgb A1c has improved since admission, 6.8 to 5.8%.  BG last 24 hr as follows:   Recent Labs   Lab 03/16/23  0946 03/16/23  0201 03/15/23  1851 03/15/23  0939 03/14/23  1732 03/14/23  0958   GLC 90 88 109* 93 108* 108*     - Continue home metformin   - Continue Victoza being used here in place of PTA Trulicity.  - Continue home Actos but low threshold to stop this if any issues with hypoglycemia  - No plan to resume home glipizide (not needed and has risk for hypoglycemia)    # Right hand numbness - Reported to prior providers, no complaints today. 3/5 x-ray did not show acute pathology. Questioned if he was developing some compressive neuropathy d/t positioning off left hip in bed for several months but patient states this was present before this. He declines desire for further w/u at this time.   - OP neurology if patient desires.     # JAIR - Continue home CPAP w/ naps and HS.    # Hx DVT, PE - Continue PTA Apixaban    # Chronic neck pain - Stable. Continue diclofenac gel with rare PRN Tylenol/Robaxin use.          CODE: full  DVT: apixaban  Diet: regular  Indications for Psychotropic Medications: N/A  Disposition: awaiting snf placement        Mariana Sorto, CNP, APRN  Internal Medicine [facility] Hospitalist  [facility]  Pager [phone]    ________________________________________________________________    Subjective & Interval History:      Don is resting in bed.  He reports feeling concerned about muscle atrophy since he has been unable to fully participate with therapy.  Otherwise doing okay - denies chest pain, dyspnea, cough, nausea, and vomiting.       Last 24 hour care team notes reviewed.   ROS: 4 point ROS (including Respiratory, CV, GI and ) was performed and negative unless otherwise noted in HPI.     Medications: Reviewed in EPIC.    Physical Exam:    Blood pressure 122/67, pulse 68,  temperature (!) 96.5  F (35.8  C), temperature source Oral, resp. rate 16, weight 117.7 kg (259 lb 8 oz), SpO2 95 %.    GENERAL: Alert and oriented x 3. Well nourished, well developed.  No acute distress.  Flat affect.   HEENT: Normocephalic, atraumatic. Anicteric sclera. Mucous membranes moist.   CV: RRR. S1, S2. No murmurs appreciated.   RESPIRATORY: Effort normal on room air. Lungs CTAB with no wheezing, rales, or rhonchi.   GI: Abdomen soft and non distended, bowel sounds present x all 4 quadrants. No tenderness, rebound, or guarding.   NEUROLOGICAL: No focal deficits. Follows commands.    MUSCULOSKELETAL: No joint swelling or tenderness.   EXTREMITIES: No gross deformities. No peripheral edema.   SKIN: Grossly warm, dry, and intact. No jaundice. No rashes.       ROUTINE IP LABS (Last four results)  CMP   Recent Labs   Lab 03/14/23  1732 03/14/23  0958 03/13/23  2111 03/13/23  1010   * 108* 222* 95     CBC No lab results found in last 7 days.  INR No lab results found in last 7 days.

## 2023-03-16 NOTE — PLAN OF CARE
Goal Outcome Evaluation:     Plan of Care Reviewed With: patient    Overall Patient Progress: no change    Pt has no c/o pain or discomfort so far this shift. Awake at midnight watching TV. Refused offer of assistance with repositioning. Agreed to check BG at 0200 for tonight only and was 88.  Using urinal in bed, staff empties. Has not been using his CPAP when sleeping. Pt has no c/o SOB and no s/s of respiratory issue noted. Appear to be sleeping/resting between cares/ meds. Continue with plan of care.    Patient's most recent vital signs are:     Vital signs:  BP: 119/55  Temp: 95.2  HR: 74  RR: 16  SpO2: 95 %     Patient does not have new respiratory symptoms.  Patient does not have new sore throat.  Patient does not have a fever greater than 99.5.

## 2023-03-16 NOTE — PLAN OF CARE
Goal Outcome Evaluation:    VS: /58 (BP Location: Right arm)   Pulse 68   Temp (!) 96  F (35.6  C) (Oral)   Resp 16   Wt 117.7 kg (259 lb 8 oz)   SpO2 94%   BMI 37.23 kg/m     O2: RA   Output: Voids in urinal; staff empties   Last BM: 3/15   Activity: Not OOB this shift; refused offer of chair   Skin: DEEP   Pain: Denies    CMS Neuro: Intact  A&O   Dressing: N/a   Diet: Reg, low appetite, drank supplement   LDA: N/a    Equipment: [facility] bed/recliner, [facility]   Plan: Con't POC.    Additional Info:        Patient's most recent vital signs are:     Vital signs:  BP: 106/58  Temp: 96  HR: 68  RR: 16  SpO2: 94 %     Patient does not have new respiratory symptoms.  Patient does not have new sore throat.  Patient does not have a fever greater than 99.5

## 2023-03-17 NOTE — PLAN OF CARE
Goal Outcome Evaluation:    VS: /54 (BP Location: Right arm)   Pulse 77   Temp (!) 96.6  F (35.9  C) (Oral)   Resp 18   Wt 117.7 kg (259 lb 8 oz)   SpO2 96%   BMI 37.23 kg/m     O2: RA   Output: Voids in urinal; staff empties   Last BM: 3/15   Activity: Not OOB this shift; refused offer of chair   Skin: DEEP   Pain: Denies    CMS Neuro: Intact  A&O   Dressing: N/a   Diet: Reg, low appetite, drank supplement w/meds   LDA: N/a    Equipment: [facility] bed/recliner, [facility]   Plan: Con't POC   Additional Info: Pt refuses full skin assessment or repositioning.      Patient's most recent vital signs are:     Vital signs:  BP: 114/54  Temp: 96.6  HR: 77  RR: 18  SpO2: 96 %     Patient does not have new respiratory symptoms.  Patient does not have new sore throat.  Patient does not have a fever greater than 99.5.

## 2023-03-17 NOTE — PLAN OF CARE
Problem: Oral Intake Inadequate  Goal: Improved Oral Intake  Description: Perform a nutrition assessment that includes a nutrition-focused physical exam; identify malnutrition risk.    Assess for adequate oral intake; if inadequate, offer oral supplemental food or drinks to enhance calorie and protein intake.    Assess for vitamin and mineral deficiencies; supplement if depleted.    Assess need and assist with with meal set-up and feeding.    Adjust diet or meal schedule based on preferences and tolerance.    Minimize unnecessary dietary restrictions to increase oral intake.    Outcome: Progressing   Goal Outcome Evaluation:  Patient was on his chair for almost 4 hours . Refused for his Voltaren. Ate dinner with good appetite. Denies headache, nausea and chest pain. Alert and oriented.VSS on RA. Call light with in reach. Can make needs known. Continue with current plan of care.    Blood pressure 114/60, pulse 79, temperature 96.9  F (36.1  C), temperature source Oral, resp. rate 18, weight 117.7 kg (259 lb 8 oz), SpO2 95 %.

## 2023-03-17 NOTE — PLAN OF CARE
Pt aox4, A2 liko (not oob this shift). Regular diet, thin liquids, takes pills whole. Continent of bowel and bladder (uses bedpan and urinal). No PRNs given overnight.

## 2023-03-18 NOTE — PLAN OF CARE
Goal Outcome Evaluation:    VS: /60 (BP Location: Right arm)   Pulse 76   Temp 98.5  F (36.9  C) (Tympanic)   Resp 18   Wt 117.7 kg (259 lb 8 oz)   SpO2 94%   BMI 37.23 kg/m     O2: RA   Output: Voids in urinal; staff empties   Last BM: 3/17   Activity: Not OOB this shift; refused offer of chair   Skin: DEEP   Pain: Denies    CMS Neuro: Intact  A&O   Dressing: N/a   Diet: Reg, low appetite, drank supplement w/meds   LDA: N/a    Equipment: [facility] bed/recliner, [facility]   Plan: Con't POC   Additional Info:        Patient's most recent vital signs are:     Vital signs:  BP: 118/60  Temp: 98.5  HR: 76  RR: 18  SpO2: 94 %     Patient does not have new respiratory symptoms.  Patient does not have new sore throat.  Patient does not have a fever greater than 99.5

## 2023-03-18 NOTE — PLAN OF CARE
Goal Outcome Evaluation:  No acute issues overnight. Sleeping well throughout shift as noted during rounds. Continent using urinal indep.- staff emptied. LBM yesterday. Has no c/o's or requests as of yet.        Patient's most recent vital signs are:     Vital signs:  BP: 121/59  Temp: 97.8  HR: 84  RR: 20  SpO2: 97 %     Patient does not have new respiratory symptoms.  Patient does not have new sore throat.  Patient does not have a fever greater than 99.5.

## 2023-03-18 NOTE — PLAN OF CARE
Goal Outcome Evaluation:    Patient is AOx4, able to make needs known. Patient was continent of bowel during shift using bedpan. pericares performed, excoriation and redness noted to the bottom and groin, area cleaned and dried, scheduled micatin applied. Patient was able to sit in the recliner during dinner, needs A2 with Liko with transfers.Verbalized left hip pain,prn tylenol and robaxin given with some good effect.patient denied CP, SOB. Continue with plan of care.      Patient's most recent vital signs are:     Vital signs:  BP: 121/59  Temp: 97.8  HR: 84  RR: 20  SpO2: 97 %     Patient does not have new respiratory symptoms.  Patient does not have new sore throat.  Patient does not have a fever greater than 99.5.

## 2023-03-19 NOTE — PLAN OF CARE
Goal Outcome Evaluation:    VS: /58 (BP Location: Right arm)   Pulse 74   Temp 97.1  F (36.2  C) (Oral)   Resp 18   Wt 117.7 kg (259 lb 8 oz)   SpO2 97%   BMI 37.23 kg/m     O2: RA   Output: Voids in urinal; staff empties   Last BM: 3/17   Activity: Not OOB this shift; refused offer of chair (prefers dagmar/dinner)   Skin: DEEP  Pt's tongue has a small slit at tip and is painful.   Pain: Denies    CMS Neuro: Intact  A&O   Dressing: N/a   Diet: Reg, low appetite, drank supplement w/meds   LDA: N/a    Equipment: [facility] bed/recliner, [place]   Plan: Con't POC   Additional Info: Pt c/o dryness in room; lips dry - given lip balm and air dryness - placed damp washcloths over vent to hopefully supply moisture to air.      Patient's most recent vital signs are:     Vital signs:  BP: 110/58  Temp: 97.1  HR: 74  RR: 18  SpO2: 97 %     Patient does not have new respiratory symptoms.  Patient does not have new sore throat.  Patient does not have a fever greater than 99.5.

## 2023-03-19 NOTE — PLAN OF CARE
Goal Outcome Evaluation:    Patient is AOx4, able to make needs known. Patient was able to sit in the recliner during dinner, needs A2 with Liko with transfers.Verbalized left hip pain,prn tylenol and robaxin given with some good effect.patient denied CP, SOB. Continue with plan of care.  Patient continues to use urinal independently, declines repositioning. Groin area cleaned and micatin powder applied.    Patient's most recent vital signs are:     Vital signs:  BP: 118/60  Temp: 98.5  HR: 76  RR: 18  SpO2: 94 %     Patient does not have new respiratory symptoms.  Patient does not have new sore throat.  Patient does not have a fever greater than 99.5.

## 2023-03-19 NOTE — PLAN OF CARE
Goal Outcome Evaluation:  Pt.A&Ox4. Awake beginning of shift and called to report being uncomfortable in bed, mainly crumpled chux and lift sling. Refused removal of lift sling. Repostioned/adjusted until comfortable. Continent using urinal indep.- staff emptied.        Patient's most recent vital signs are:     Vital signs:  BP: 118/60  Temp: 98.5  HR: 76  RR: 18  SpO2: 94 %     Patient does not have new respiratory symptoms.  Patient does not have new sore throat.  Patient does not have a fever greater than 99.5.

## 2023-03-20 NOTE — PROGRESS NOTES
[facility]  Orthopaedic Surgery, Joint Replacement Consultation  by Rom Meyers M.D.     Waldo Bustos MRN# [id number]     YOB: 1950      Requesting physician: No ref. provider found  Jv Felix, PCP  MD Poo[facility] orthopedics  Jem Hickman MD [facility], [place], [place]      Background history:  DX:  1. Morbid obesity Body mass index is 45.2 kg/m .  2. Diabetes mellitus  3. Left BELLA prosthetic joint infection  4. Hx of PE. Long term anticoag     TREATMENTS:  1. 3/7/2018, left two incision [facility]  2. 1/23/2019, [facility]. IV Ceftriaxone x 4 weeks for C Acnes  3. 2/2021, L hip aspiration ([facility])  4. 4/6/2022, C Acnes  5. 4/27/2022, drain placement. C Acnes.  6. 11/14/2022 L hip aspiration 3+ Proteus mirabilis , 4+ Porphyromonas sp.  7. 11/22/2022, explantation L BELLA, non-articulating PMMA spacer (Luigi) [facility]. Proteus mirabilis, Finegoldia magna, zosyn x 1 wk > po cipro/po metronidazole x 6 weeks from 11/22/22.       I met with Waldo today to review his status after the attempted aspiration of his left hip joint.  Unfortunately no fluid was obtained and so no testing was performed.  Most recent sed rate and CRP in February were normal.    Examination demonstrates his incision is healed satisfactory and there is no drainage erythema or warmth.    Recent Labs   Lab Test 03/06/23  0733 03/03/23  1313 02/27/23  0822 02/08/23  0629 02/06/23  0839 02/01/23  0709 01/30/23  0835 01/25/23  0740 01/23/23  0748 01/16/23  0748 01/09/23  1212 12/21/22  0833 12/19/22  0905   HGB 11.4* 12.2* 10.6*   < > 11.0*   < > 10.5*   < > 12.0*   < > 11.4*   < >  --    SED  --   --   --   --   --   --   --   --   --   --  77*  --  86*   CRP  --   --   --   --  3.6  --  <2.9  --  <2.9   < > <2.9   < > 6.1   WBC 10.6 10.7 9.8   < > 9.4   < > 8.2   < > 9.1   < > 9.2   < >  --     < > = values in this interval not displayed.      No results for input(s): FTYP, FNEU, FOTH, FCOL, FAPR, FWBC, HX6614, REDCELLCOUNT, PMN, MONONCLRS in the last 81398 hours.  No results found for: COBAL, CHRMT    SYNOVASURE (ALPHA DEFENSIN LFA)  No results found for: [id number]      Impression and plan:  Given the negative aspiration attempts, we will proceed with percutaneous arthroscopic biopsy of his left hip synovium for tissue cultures and attempted arthrocentesis under general anesthesia on an outpatient basis.  I explained the reason and rationale for this recommendation to work-up his infection prior to making a final decision about proceeding with second stage reimplantation total hip arthroplasty.    Rom Meyers MD [facility] Professor  Oncology and Adult Reconstructive Surgery  Dept Orthopaedic Surgery, [facility]  [phone] office, [phone] pager  [email or web address]    Total combined visit time and work time before and after clinic visit on encounter date = 20 min

## 2023-03-20 NOTE — PLAN OF CARE
Patient is alert and oriented x4. Able to make needs known to staff. Patient is continent of both bowel and bladder. Urinal within reach. Transfers assist-2 with liko lift. Left for scheduled ortho appointment at 1053 in w/c. PRN acetaminophen and robaxin before tip. BGs was 101 this morning. Denied chest pain, SOB, N&V, and cough. Will continue with POC.    Patient's most recent vital signs are:     Vital signs:  BP: 135/50  Temp: 96.3  HR: 68  RR: 17  SpO2: 97 %     Patient does not have new respiratory symptoms.  Patient does not have new sore throat.  Patient does not have a fever greater than 99.5.

## 2023-03-20 NOTE — LETTER
3/20/2023         RE: Waldo Bustos  [address]  [facility] [address]        Dear Colleague,    Thank you for referring your patient, Waldo Bustos, to the [facility] [place]. Please see a copy of my visit note below.       [facility]  Orthopaedic Surgery, Joint Replacement Consultation  by Rom Meyers M.D.     Waldo Bustos MRN# [id number]     YOB: 1950      Requesting physician: No ref. provider found  Jv Felix, PCP  Rogelio Shi MD [facility]  Jem Hickman MD [facility], [place], [place]      Background history:  DX:  1. Morbid obesity Body mass index is 45.2 kg/m .  2. Diabetes mellitus  3. Left BELLA prosthetic joint infection  4. Hx of PE. Long term anticoag     TREATMENTS:  1. 3/7/2018, left two incision total hip arthroplasty ([facility])  2. 1/23/2019, revision left total hip arthroplasty ([facility]) [facility]. IV Ceftriaxone x 4 weeks for C Acnes  3. 2/2021, L hip aspiration ([facility])  4. 4/6/2022, C Acnes  5. 4/27/2022, drain placement. C Acnes.  6. 11/14/2022 L hip aspiration 3+ Proteus mirabilis , 4+ Porphyromonas sp.  7. 11/22/2022, explantation L BELLA, non-articulating PMMA spacer (Luigi) [facility]. Proteus mirabilis, Finegoldia magna, zosyn x 1 wk > po cipro/po metronidazole x 6 weeks from 11/22/22.       I met with Waldo today to review his status after the attempted aspiration of his left hip joint.  Unfortunately no fluid was obtained and so no testing was performed.  Most recent sed rate and CRP in February were normal.    Examination demonstrates his incision is healed satisfactory and there is no drainage erythema or warmth.    Recent Labs   Lab Test 03/06/23  0733 03/03/23  1313 02/27/23  0822 02/08/23  0629 02/06/23  0839 02/01/23  0709 01/30/23  0835 01/25/23  0740 01/23/23  0748 01/16/23  0748 01/09/23  1212 12/21/22  0833 12/19/22  0905   HGB 11.4* 12.2* 10.6*   < > 11.0*   < > 10.5*   < > 12.0*   < > 11.4*   < >   --    SED  --   --   --   --   --   --   --   --   --   --  77*  --  86*   CRP  --   --   --   --  3.6  --  <2.9  --  <2.9   < > <2.9   < > 6.1   WBC 10.6 10.7 9.8   < > 9.4   < > 8.2   < > 9.1   < > 9.2   < >  --     < > = values in this interval not displayed.     No results for input(s): FTYP, FNEU, FOTH, FCOL, FAPR, FWBC, OH4341, REDCELLCOUNT, PMN, MONONCLRS in the last 80716 hours.  No results found for: COBAL, CHRMT    SYNOVASURE (ALPHA DEFENSIN LFA)  No results found for: 260909    Impression and plan:  Given the negative aspiration attempts, we will proceed with percutaneous arthroscopic biopsy of his left hip synovium for tissue cultures and attempted arthrocentesis under general anesthesia on an outpatient basis.  I explained the reason and rationale for this recommendation to work-up his infection prior to making a final decision about proceeding with second stage reimplantation total hip arthroplasty.    MD Vanessa Stephenson Family Professor  Oncology and Adult Reconstructive Surgery  Dept Orthopaedic Surgery, [facility]  [phone] office, [phone] pager  [email or web address]    Total combined visit time and work time before and after clinic visit on encounter date = 20 min

## 2023-03-20 NOTE — NURSING NOTE
Chief Complaint   Patient presents with     RECHECK     Return post aspiration. Aspiration on 03/06/2023.        72 year old  1950    There were no vitals taken for this visit.    Past Surgical History:   Procedure Laterality Date     Cataract       COLONOSCOPY       EGD       IR FINE NEEDLE ASPIRATION W ULTRASOUND  3/6/2023     IR IVC FILTER PLACEMENT      removed     IRRIGATION AND DEBRIDEMENT HIP, PLACE ANTIBIOTIC CEMENT BEADS / SPACER Left 11/22/2022    Procedure: and Reconstruction Using G 20 Prosthetic Antibiotic Cement Spacer;  Surgeon: Rom Meyers MD;  Location: [facility] OR     REMOVE HARDWARE ARTHROPLASTY HIP Left 11/22/2022    Procedure: Explantation of Chronic  Infected Left Total Hip Arthroplasty, Extended Trochanteric Osteotomy with Extensive Debridement;  Surgeon: Rom Meyers MD;  Location: [facility] OR     [facility] PELVIS/HIP JOINT SURGERY UNLISTED       [facility] STOMACH SURGERY PROCEDURE UNLISTED  1954    2 Hernia surgeries as a child                 Pain Assessment  Patient Currently in Pain: Yes  0-10 Pain Scale: 8  Primary Pain Location: Neck                           [facility] [place] [address] AT CORNER OF [facility]        Allergies   Allergen Reactions     Sulfa Drugs      Other reaction(s): *Unknown - Childhood Rxn     Tizanidine Other (See Comments)     Frequent urination; causes drowsiness, and dry mouth             No current facility-administered medications for this visit.     No current outpatient medications on file.     Facility-Administered Medications Ordered in Other Visits   Medication     acetaminophen (TYLENOL) tablet 1,000 mg     apixaban ANTICOAGULANT (ELIQUIS) tablet 5 mg     atorvastatin (LIPITOR) tablet 40 mg     calcium carbonate (TUMS) chewable tablet 500 mg     glucose gel 15-30 g    Or     dextrose 50 % injection 25-50 mL    Or     glucagon injection 1 mg     diclofenac (VOLTAREN) 1 % topical gel 2 g     liraglutide (VICTOZA) injection  1.8 mg     metFORMIN (GLUCOPHAGE XR) 24 hr tablet 2,000 mg     methocarbamol (ROBAXIN) tablet 500 mg     miconazole (MICATIN) 2 % powder     naloxone (NARCAN) injection 0.2 mg    Or     naloxone (NARCAN) injection 0.4 mg    Or     naloxone (NARCAN) injection 0.2 mg    Or     naloxone (NARCAN) injection 0.4 mg     Nurse may request from Pharmacy a change of form of medication (e.g. Liquid to tablet).     ondansetron (ZOFRAN ODT) ODT tab 4 mg     Patient is already receiving anticoagulation with heparin, enoxaparin (LOVENOX), warfarin (COUMADIN)  or other anticoagulant medication     pioglitazone (ACTOS) tablet 45 mg     polyethylene glycol (MIRALAX) Packet 17 g     senna-docusate (SENOKOT-S/PERICOLACE) 8.6-50 MG per tablet 2 tablet             Questionnaires:    HOOS Hip Dysfunction & Osteoarthritis Outcome Questionnaire    Hip Dysfunction & Osteoarthritis Outcome Score (HOOS), English Version LK 2.0 (Irene CORDERO, Tenzin SPENCE, Milly WILLIAM, 2003) 11/14/2022   S1. Do you feel grinding, hear clicking or any other type of noise from your hip? Rarely   S2. Difficulties spreading legs wide apart Moderate   S3. Difficulties to stride out when walking Extreme   S4. How severe is your hip joint stiffness after first wakening in the morning? Extreme   S5. How severe is your hip stiffness after sitting, lying or resting LATER IN THE DAY? Moderate   Symptom Count 5   Symptom Sum 13   Symptom Mean 2.6   Symptom Subscale Score 35   P1. How often is your hip painful? Always   P2. Straightening your hip fully Severe   P3. Bending your hip FULLY Severe   P4. Walking on a flat surface Extreme   P5. Going up or down stairs Extreme   P6. At night while in bed Moderate   P7. Sitting or lying Moderate   P8. Standing upright Moderate   P9. Walking on a hard surface (asphalt, concrete, etc.) Severe   P10. Walking on an uneven surface Extreme   Pain Count 10   Pain Sum 31   Pain Mean 3.1   Pain Subscale Score 22.5   A1. Descending stairs Severe    A2. Ascending stairs Severe   A3. Rising from sitting Severe   A4. Standing Moderate   A5. Bending to the floor/ an object Severe   A6. Walking on a flat surface Extreme   A7. Getting in/out of car Severe   A8. Going shopping Severe   A9. Putting on socks/stockings Moderate   A10. Rising from bed Extreme   A11. Taking off socks/stockings Moderate   A12. Lying in bed (turning over, maintaining hip position) Severe   A13. Getting in/out of bed Severe   A14. Sitting Moderate   A15. Getting on/off toilet Moderate   A16. Heavy domestic duties (moving heavy boxes, scrubbing floors, etc.) Extreme   A17. Light domestic duties (cooking, dusting, etc.) Severe   ADL Count 17   ADL Sum 49   ADL Mean 2.88   ADL Subscale Score 27.94   SP1. Squatting Extreme   SP2. Running Extreme   SP3. Twisting/pivoting on loaded leg Extreme   SP4. Walking on uneven surface Extreme   Sports/Rec Count 4   Sports/Rec Sum 16   Sports/Rec Mean 4   Sports/Rec Subscale Score 0   Q1. How often are you aware of your hip problem? Constantly   Q2. Have you modified you life style to avoid activities potentially damaging to your hip? Totally   Q3. How much are you troubled with lack of confidence in your hip? Extremely   Q4. In general, how much difficulty do you have with your hip? Extreme   QOL Count 4   QOL Sum 16   QOL Mean 4   Quality of Life Subscale Score 0              KOOS Knee Survey Assessment    No flowsheet data found.           Promis 10 Assessment    PROMIS 10 11/14/2022   In general, would you say your health is: Poor   In general, would you say your quality of life is: Poor   In general, how would you rate your physical health? Fair   In general, how would you rate your mental health, including your mood and your ability to think? Poor   In general, how would you rate your satisfaction with your social activities and relationships? Poor   In general, please rate how well you carry out your usual social activities and roles Fair    To what extent are you able to carry out your everyday physical activities such as walking, climbing stairs, carrying groceries, or moving a chair? A little   How often have you been bothered by emotional problems such as feeling anxious, depressed or irritable? Often   How would you rate your fatigue on average? Severe   How would you rate your pain on average?   0 = No Pain  to  10 = Worst Imaginable Pain 8   In general, would you say your health is: 1   In general, would you say your quality of life is: 1   In general, how would you rate your physical health? 2   In general, how would you rate your mental health, including your mood and your ability to think? 1   In general, how would you rate your satisfaction with your social activities and relationships? 1   In general, please rate how well you carry out your usual social activities and roles. (This includes activities at home, at work and in your community, and responsibilities as a parent, child, spouse, employee, friend, etc.) 2   To what extent are you able to carry out your everyday physical activities such as walking, climbing stairs, carrying groceries, or moving a chair? 2   In the past 7 days, how often have you been bothered by emotional problems such as feeling anxious, depressed, or irritable? 4   In the past 7 days, how would you rate your fatigue on average? 4   In the past 7 days, how would you rate your pain on average, where 0 means no pain, and 10 means worst imaginable pain? 8   Global Mental Health Score 5   Global Physical Health Score 8   PROMIS TOTAL - SUBSCORES 13   Some recent data might be hidden              Ortho [place] Knee Questionnaire    No flowsheet data found.

## 2023-03-20 NOTE — PLAN OF CARE
"      VS: /67 (BP Location: Right arm)   Pulse 91   Temp (!) 96.7  F (35.9  C) (Oral)   Resp 18   Wt 118 kg (260 lb 1.6 oz)   SpO2 91%   BMI 37.32 kg/m     O2: RA   Output: 200 ml   Last BM: 3/20/23   Activity: Assist of two with liko lift.    Skin: X; redness and blanchable on coccyx.    Pain: Denies    CMS: X; movement    Dressing: BRII   Diet: Regular with thin liquid.   LDA: None    Equipment: Liko, sling, w/c urinal, and bedpan.   Plan: Will cont's with POC.    Additional Info: Pt is alert and oriented x 4. On regular diet with thin liquid. Pt trigger sepsis, VSS and lactic acid lab protocol initiated. Pt did not like the lab draw. Stated, \" I am not going to have them draw blood over and over. I Have a lots of scar tissue from too many blood draws. I will let them poke me once, if they don't get it the first time, I will not allow them the second time.\" Nurse updated  on what pt said. Pt lactic acid result came back 1.3. pt has been stable all evening. Denies chest pain or SOB. Will continue with POC.          Patient's most recent vital signs are:     Vital signs:  BP: 130/67  Temp: 96.7  HR: 91  RR: 18  SpO2: 91 %     Patient does not have new respiratory symptoms.  Patient does not have new sore throat.  Patient does not have a fever greater than 99.5.                   "

## 2023-03-20 NOTE — PLAN OF CARE
Goal Outcome Evaluation:    Patient AOx4, able to make needs known. Declined to get OOB today, forgot to order dinner. A snack was prepared from the unit kitchen and patient drank some supplement in his room. He endorsed eating late lunch and not feeling hungry. PRN tylenol and robaxin given at bedtime, declined voltalren gel. Denied CP or SOB, continue with POC.      Patient's most recent vital signs are:     Vital signs:  BP: 128/59  Temp: 95.3  HR: 72  RR: 18  SpO2: 96 %     Patient does not have new respiratory symptoms.  Patient does not have new sore throat.  Patient does not have a fever greater than 99.5.

## 2023-03-20 NOTE — PLAN OF CARE
Goal Outcome Evaluation:  No acute issues overnight. Has no c/o's pain, discomfort, CP/SOB or any other c/o's during shift. Continent using urinal indep.- staff empties. Liko lift transfers. NWADRIAN PIERREE. Has Ortho appt.this AM - pickup @ 1045 via wheelchair.        Patient's most recent vital signs are:     Vital signs:  BP: 128/59  Temp: 95.3  HR: 72  RR: 18  SpO2: 96 %     Patient does not have new respiratory symptoms.  Patient does not have new sore throat.  Patient does not have a fever greater than 99.5.

## 2023-03-21 NOTE — PROGRESS NOTES
SW had not heard any further from the group home referrals placed last week. ZEB sent follow up emails to [facility] and [facility]. ACR suggested an [facility] rather than their group homes.     NEREYDA Shaffer  Post Acute Float   ARU/ISABEL/JENNYFER    Phone: [phone]  Fax: [phone]

## 2023-03-21 NOTE — PLAN OF CARE
Goal Outcome Evaluation:    Pt is A&OX4, calm, & cooperative with care. Denied CP, SOB, & n/v. A of 2 with Liko lift; was not OOB this shift. Continent for both B&B; uses bedpan for BM. Urinal at the bedside for bladder. Takes med whole with thin liquid. No acute issues overnight. Pt slept well throughout the night. Able to make needs known & call light within reach. Will continue with plan of care.    Patient's most recent vital signs are:     Vital signs:  BP: 130/67  Temp: 96.7  HR: 91  RR: 18  SpO2: 91 %     Patient does not have new respiratory symptoms.  Patient does not have new sore throat.  Patient does not have a fever greater than 99.5.

## 2023-03-21 NOTE — PLAN OF CARE
"Pt A&O x4. Lift. Cont, uses urinal at bedside. Upon writer's arrival in AM, pt requested that meds not be given until 1030. When writer arrived at 1030, pt stated \"just let me sleep\". Pt did take AM meds to be given but slept most of shift. Continue w/ plan of care.   "

## 2023-03-21 NOTE — PROGRESS NOTES
CLINICAL NUTRITION SERVICES - REASSESSMENT NOTE     Nutrition Prescription    RECOMMENDATIONS FOR MDs/PROVIDERS TO ORDER:  None    Malnutrition Status:    Moderate malnutrition in the context of acute on chronic illness    Recommendations already ordered by Registered Dietitian (RD):  Encouraged adequate protein intake and use of supplements  Holding Ensure order given large pt stock    Future/Additional Recommendations:  Monitor labs, intakes, and weight trends.     EVALUATION OF THE PROGRESS TOWARD GOALS   Diet: Regular  Intake/[place]ace: mostly 100% of documented meals.   Snacks/supplements: Ensure Max every other day + PRN    Pt ordering (on average) 1635 kcal and 66 g protein per day per [employer]. Pt receiving Ensure Max every other day.  With documented intakes, pt is likely meeting 85% minimum energy and ~70% protein needs.     NEW FINDINGS/REVIEW OF SYSTEMS    Nutrition/GI: Pt states he has been drinking Ensure maybe every three days. Getting Ensure delivered every other day but pt with 8 unopened in his room. Eating 1-2 meals daily but likely meeting needs. Encouraged increased protein intakes and drinking Ensure more frequently to optimize nutrition status prior to any possible surgery.     Weights:  Pt previously down 60 lb (19%) in ~ <1 month prior to admission  Pt seems weight stable with some fluctuations during admission.   Wt Readings from Last Encounters:   03/20/23 118 kg (260 lb 1.6 oz)   03/06/23 117.7 kg (259 lb 8 oz)   01/30/23 119.5 kg (263 lb 8 oz)   01/26/23 118.1 kg (260 lb 4.8 oz)   01/12/23 119 kg (262 lb 6.4 oz)   01/09/23 119.7 kg (264 lb)   12/30/22 118.5 kg (261 lb 3.2 oz)   11/22/22 147.1 kg (324 lb 4.8 oz)   11/14/22 142.9 kg (315 lb)   11/09/22 146.8 kg (323 lb 9.6 oz)   05/19/22 136.1 kg (300 lb)   07/03/18 148.9 kg (328 lb 4.8 oz)      Labs reviewed   03/18/23 08:28 03/18/23 18:05 03/19/23 07:56 03/20/23 08:27 03/20/23 18:10 03/21/23 08:10   Glucose 110 (H) 136 (H) 98 101 (H)  87 106 (H)       11/23/22 06:12 03/06/23 07:33   Hemoglobin A1C 6.8 (H) 5.8 (H)     Medications reviewed: liraglutide, metformin, pioglitazone    MALNUTRITION  % Intake: Decreased intake does not meet criteria  % Weight Loss: > 10% in 6 months (severe)  Subcutaneous Fat Loss: Upper arm:  Mild  Muscle Loss: Temporal:  Mild, Upper arm (bicep, tricep):  Moderate, Upper leg (quadricep, hamstring):  Moderate and Posterior calf:  Moderate-severe  Fluid Accumulation/Edema: Trace-mild  Malnutrition Diagnosis: Moderate malnutrition in the context of acute on chronic illness    Previous Goals   Patient to consume % of nutritionally adequate meal trays TID, or the equivalent with supplements/snacks.  Evaluation: Met    Previous Nutrition Diagnosis  Predicted inadequate nutrient intake (protein-energy) related to eating mostly 100% of meals documented the past week with good appetite but potential for PO intake decline.   Evaluation: No change    CURRENT NUTRITION DIAGNOSIS  Predicted inadequate nutrient intake (protein-energy) related to eating mostly 100% of meals documented the past week with good appetite but potential for PO intake decline.     INTERVENTIONS  Implementation  Nutrition education for nutrition relationship to health/disease   Medical food supplement therapy - Holding Ensure for the week given large stock in room    Goals  Patient to consume % of nutritionally adequate meal trays TID, or the equivalent with supplements/snacks.    Monitoring/Evaluation  Progress toward goals will be monitored and evaluated per protocol.    Dee Alanis MS, RDN, LDN  RD pager: [phone]  WB Weekend/Holiday Pager: [phone]

## 2023-03-21 NOTE — PROGRESS NOTES
"[facility]   Internal Medicine Progress Note    Rehab Day # 87    Assessment & Plan:   Waldo \"Don\" BIANCA Bustos is a 72 year old male with history of morbid obesity, DMII, HLD, JAIR, PE/DVT on chronic apixaban, venous insufficiency, OA, and chronic L hip prosthetic joint infection who was admitted to [facility] 11/22/22 for scheduled explantation of L hip prosthesis, debridement, and reconstruction with antibiotic cement spacer. Course c/b acute encephalopathy (2/2 anesthesia and sedating meds), acute hypoxic respiratory failure (2/2 OHS), acute blood loss anemia, HALEY, and profound physical deconditioning. Tx to TCU for ongoing rehabilitation. He is now awaiting community placement after not making progress here       Chronic left hip PJI s/p explantation, debridement, and reconstruction with antibiotic spacer (11/22/22): Per Dr. Meyers. Surgical cx +for Finegoldia magna and Proteus mirabilis (also had C. acnes in the past but doing well despite no directed treatment of this). S/p 6 wk course of PO Cipro and Flagyl per ID ended 1/3/23. Higher risk for treatment failure given comorbidities, sinus tract, and history of PJI. Saw Dr. Meyers 1/9/23 and completed 8 wk antibiotic free holiday. L hip aspiration was attempted by IR 3/6 but no fluid could be obtained. Last saw Dr. Meyers 3/20, plan is for OP biopsy under general anesthesia   - Ortho to schedule OP biopsy under general anesthesia, this needs to be done prior to final decision about proceeding with second stage reimplantation BELLA  - NWB LLE  - Continue to encourage getting up to chair at least 1x/day     Other Medical Issues:  Physical deconditioning: PT, OT signed off as patient unwilling to mobilize due to pain.  Had been doing several months of in-bed therapy. SW looking for community placement   DMII: A1c 5.8 3/6/23. Currently on Metformin, Pioglitazone, and Victoza 1.8 mg daily. Glucose stable. Continue metformin, Victoza (being " "used here in place of PTA Trulicity), and Pioglitazone. No plan to resume home glipizide (not needed and has risk for hypoglycemia)  OSH: Continue CPAP  R hand numbness: Prior issue, appears resolved. See notes 3/16 and prior for details. Consider OP neurology consult if recurrent symptoms   H/o DVT, PE: No acute concerns. Continue Apixaban  Chronic neck pain: Stable. Continue diclofenac gel with rare PRN Tylenol, Robaxin use  HLD: Continue statin      The above was discuss with patient and attending physician, Dr. Trevino who agrees with the above    CODE: Full  DVT: Apixaban  Diet: Regular  Indications for Psychotropic Medications: N/A  Disposition: Pending placement     Mae Moseley PA-C  Hospitalist Service  Pager: [phone]  ________________________________________________________________    Subjective & Interval History:    Frustrated by ortho appointment yesterday. Feels as if he didn't need to go \"All the way over there\" just to be told he needs a procedure under anesthesia. Denies new pain. Appetite stable, has not had breakfast yet. No abdominal pain, N/V, constipation, diarrhea. No fever or chills. Denies confusion    Last 24 hour care team notes reviewed.   ROS: 4 point ROS (including Respiratory, CV, GI and ) was performed and negative unless otherwise noted in HPI.     Medications: Reviewed in EPIC.    Physical Exam:    General Appearance: Alert and oriented x3, appears chronically ill. In bed  HEENT: Anicteric sclera, MMM   Respiratory: Breathing comfortably on room air, lungs CTAB without wheezing or crackles   Cardiovascular: RRR, S1, S2. No murmur noted  GI: Abdomen soft, non-tender with active bowel sounds. No guarding or rebound  Lymph/Hematologic: No peripheral edema, distal pulses palpable   Skin: No rash or jaundice   Musculoskeletal: Moves all extremities   Neurologic: No focal deficits, CN II-XII grossly intact      Lines/Tubes/Drains:      "

## 2023-03-21 NOTE — PROGRESS NOTES
3/20/23   Catrachito in Financial Counseling left  a vm stating he did call the [facility] to find out where we are at with MA application.  The [facility] needs two proofs.  1-Proof of insurance premium from [facility]. 2- Proof of that [employer].  Send both in with Case#[id number] and name and date of birth. With fax number [phone]   Or email [email or web address]      3/21/23  ZEB left a vm and email for nephew about the info about.  Also stated unable to find placement in SNF, [facility] and Group homes at this time.  We are waiting to see if pt gets a new hip or not and return home.     Emailed:   [email or web address]- sent referral   [facility]- sent referral   [facility] home health care - sent referral     Denial-  [facility] in [place]- PP only.

## 2023-03-22 NOTE — TELEPHONE ENCOUNTER
[facility] Call Center    Phone Message    May a detailed message be left on voicemail: yes     Reason for Call: Other: Patient is still waiting for a call back to talk about next procedure.     Action Taken: Message routed to:  [facility] ([facility] Orthopedics    Travel Screening: Not Applicable

## 2023-03-22 NOTE — PLAN OF CARE
Goal Outcome Evaluation:    Patient is alert and oriented x 4. He is able to make his needs known. Pt denied SOB and chest pain. He also denied nausea and vomited. He is able to swallow without difficulty. Assessed his buttocks and dressing is clean, dry and intact. Pt refused to be repositioned throughout the shift.     Patient's most recent vital signs are:     Vital signs:  BP: 124/66  Temp: 97.5  HR: 77  RR: 18  SpO2: 96 %     Patient does not have new respiratory symptoms.  Patient does not have new sore throat.  Patient does not have a fever greater than 99.5.

## 2023-03-22 NOTE — PLAN OF CARE
No acute issue during shift. Alert and oriented x4. No reported sob and cp. Able to make needs known. On regular diet. Continent both bladder and bowel. liko lift for transfers. NWB on LLE. Wound on left hip BRII. No complain of pain or discomfort this time. No PRN medications given. Patient appears to be sleeping well during safety rounds. Continue with the plan of care.      Patient's most recent vital signs are:     Vital signs:  BP: 124/66  Temp: 97.5  HR: 77  RR: 18  SpO2: 96 %     Patient does not have new respiratory symptoms.  Patient does not have new sore throat.  Patient does not have a fever greater than 99.5.

## 2023-03-22 NOTE — PROGRESS NOTES
"Emailed:   [facility]  [facility] care -Nurse will come and do assessment.   [facility]- sent referral   [facility] health care - sent referral - they need more paperwork.      Denial-  [facility] in [place]- PP only.   [facility]- sent referral - not enough staffing.     SW emailed and called [place] [employer] worker/Clare.  SW needs CSSP and [place] [employer].  She can't get too much more done without MA being open.     SW received emails from [facility] and [facility].  They are willing to come and assess pt.     ZEB sent another email to aidan telling him the above good news. Nephkimberly stated all proofs are in to [facility].  [facility] worker Yamilet said \"she will get to it when she gets to it.\"  SW left a vm for Yamilet's supervisor.  It will take up to 3 days for call back.      SW sat in on assessment with [facility]. It sounded positive for placement but they have to wait on MA to be opened.  SW will let them know when that happens.     NEREYDA Sharp   [facility], Transitional Care Unit   Social Work   [facility] [place] [address], [address]th [place] [address]  (ph) [phone]      "

## 2023-03-23 NOTE — PROGRESS NOTES
Writer received call from [facility]  [phone] stating that the processing of patient's MA proofs cannot be expedited. This is due to the policy that the proofs get processed in the order that they are received. Supervisor states that the  has been working on it and will continue to do so.    PHIL Castillo, MSW  Adult Acute Care Float   Pager [phone]

## 2023-03-23 NOTE — PLAN OF CARE
Goal Outcome Evaluation:    Patient is alert and oriented x 4. He is able to make his needs known. Pt denied SOB and chest pain. He also denied nausea and vomited. He is able to swallow without difficulty. Assessed his buttocks and it's clean, dry and intact with some redness on the buttocks. Applied barrier cream to buttocks and scheduled Miconazole powder to skin folds as ordered.    Patient's most recent vital signs are:     Vital signs:  BP: 121/66  Temp: 96.8  HR: 74  RR: 20  SpO2: 96 %     Patient does not have new respiratory symptoms.  Patient does not have new sore throat.  Patient does not have a fever greater than 99.5.

## 2023-03-23 NOTE — PLAN OF CARE
Pt is A&O x 4. Able to make needs known. Denied chest pain, SOB, and N/V. Pt refused diclofenac and miconazole powder.Pt is up in recliner. Pt refused to assess skin on his back. NWB on LLE. Liko lift transfers. No BM in this shift. Call light with in reach continue with plan of care.    Patient's most recent vital signs are:     Vital signs:  BP: 119/63  Temp: 96.8  HR: 74  RR: 16  SpO2: 96 %     Patient does not have new respiratory symptoms.  Patient does not have new sore throat.  Patient does not have a fever greater than 99.5.              oral

## 2023-03-23 NOTE — PLAN OF CARE
Goal Outcome Evaluation:  No acute issues overnight. Appears to be sleeping well. Has no c/o's pain, discomfort, CP/SOB or any other c/o's during shift. Continent using urinal indep.- staff emptied. LBM=lrg.yesterday. NWB LLE - liko lift transfers.        Patient's most recent vital signs are:     Vital signs:  BP: 121/66  Temp: 96.8  HR: 74  RR: 20  SpO2: 96 %     Patient does not have new respiratory symptoms.  Patient does not have new sore throat.  Patient does not have a fever greater than 99.5.

## 2023-03-24 NOTE — PLAN OF CARE
Goal Outcome Evaluation:    Pt is A&OX4, calm, & cooperative with care. Denied CP, SOB, & n/v. NWB LLE. A of 2 with Liko lift; was not OOB this shift. Continent for both B&B; uses bedpan for BM. Urinal at the bedside for bladder. Takes med whole with thin liquid. No acute issues overnight. Pt slept well throughout the night. Able to make needs known & call light within reach. Will continue with plan of care.    Patient's most recent vital signs are:     Vital signs:  BP: 131/53  Temp: 95.9  HR: 72  RR: 16  SpO2: 94 %     Patient does not have new respiratory symptoms.  Patient does not have new sore throat.  Patient does not have a fever greater than 99.5.

## 2023-03-24 NOTE — TELEPHONE ENCOUNTER
"Spoke with Don regarding pre-op history and physical requirement. Surgery is scheduled for April 7th. PAC appointment was scheduled prior. He is refusing to go to an in-person appointment due to transportation. Last clinic visit he did not have a good experience with transportation and states he is paying out of pocket. He does not feel confident that he would be able to participate in a virtual visit on his phone.     He resides at [facility]. The patients states \"someone listens to my lungs and heart everyday, why can't they do the pre-op?\" Will see the note from the provider in the TCU suffices for a pre-op exam.    Tara Holter, MARIBELCC    "

## 2023-03-24 NOTE — PLAN OF CARE
Goal Outcome Evaluation:    Patient is alert and oriented x 4. He is able to make his needs known. Pt denied SOB and chest pain. He is able to swallow without difficulty. His buttocks is clean, dry and intact with some redness. Applied barrier cream and scheduled Miconazole powder to skin folds as ordered.    Patient's most recent vital signs are:     Vital signs:  BP: 131/53  Temp: 95.9  HR: 72  RR: 16  SpO2: 94 %     Patient does not have new respiratory symptoms.  Patient does not have new sore throat.  Patient does not have a fever greater than 99.5.

## 2023-03-24 NOTE — PROGRESS NOTES
"  [facility]  Transitional Care Unit Progress Note  Waldo Bustos  [id number]  March 25, 2023         Assessment & Plan:   Waldo \"Don\" BIANCA Bustos is a 72 year old male with history of morbid obesity, DMII, HLD, JAIR, PE/DVT on chronic apixaban, venous insufficiency, OA, and chronic L hip prosthetic joint infection who was admitted to [facility] 11/22/22 for scheduled explantation of L hip prosthesis, debridement, and reconstruction with antibiotic cement spacer. Course c/b acute encephalopathy (2/2 anesthesia and sedating meds), acute hypoxic respiratory failure (2/2 OHS), acute blood loss anemia, HALEY, and profound physical deconditioning. Tx to TCU for ongoing rehabilitation. He is now awaiting community placement after not making progress here.    Chronic left hip PJI s/p explantation, debridement, and reconstruction with antibiotic spacer (11/22/22): Per Dr. Meyers. Surgical cx +for Finegoldia magna and Proteus mirabilis (also had C. acnes in the past but doing well despite no directed treatment of this). S/p 6 wk course of PO Cipro and Flagyl per ID ended 1/3/23. Higher risk for treatment failure given comorbidities, sinus tract, and history of PJI. Saw Dr. Meyers 1/9/23 and completed 8 wk antibiotic free holiday. L hip aspiration was attempted by IR 3/6 but no fluid could be obtained. Last saw Dr. Meyers 3/20, plan is for OP biopsy under general anesthesia. Although not currently showing up in future appointments, pt states he has pre-op for prior to biopsy on 4/7, but would rather have a provider here do pre-op as he incurs significant cost for transfer by ambulance to the [facility].   - Pt apparently has pre-op scheduled on 4/7 at [facility]; NEED TO D/W ORTHO PRIOR TO SEE IF THIS CAN BE CANCELLED AND DONE BY A TCU PROVIDER DUE TO COST OF TRANSFER BY AMBULANCE TO [facility].  - Ortho to schedule OP biopsy under general anesthesia, this needs to be done prior to final decision about proceeding with second " stage reimplantation BELLA  - NWB LLE  - Continue to encourage getting up to chair at least 1x/day     Other Medical Issues:  Physical deconditioning: PT, OT signed off as patient unwilling to mobilize due to pain. Had been doing several months of in-bed therapy. SW looking for community placement.   DMII: A1c 5.8 3/6/23. Currently on Metformin, Pioglitazone, and Victoza 1.8 mg daily. Glucose stable. Continue metformin, Victoza (being used here in place of PTA Trulicity), and Pioglitazone. No plan to resume home glipizide (not needed and has risk for hypoglycemia).  OSH: Continue CPAP.  R hand numbness: Prior issue, appears resolved. See notes 3/16 and prior for details. Consider OP neurology consult if recurrent symptoms.   H/o DVT, PE: No acute concerns. Continue Apixaban.  Chronic neck pain: Stable. Continue diclofenac gel with rare PRN Tylenol, Robaxin use.  HLD: Continue statin.        CODE: Full  DVT: Apixaban  Diet: Regular  Indications for Psychotropic Medications: N/A    Gilson Stanton PA-C  Internal Medicine Hospitalist   [facility]  [phone]          Consults:   PT/OT         Discharge Planning:   SW continues to work on placement; NEED TO D/W ORTHO PRIOR TO SCHEDULED PRE-OP AT [facility] ON 4/7 TO SEE IF THIS CAN BE CANCELLED AND DONE BY A TCU PROVIDER DUE TO COST OF TRANSFER BY AMBULANCE TO [facility].        Interval History:   No acute events overnight. Denies acute physical concerns at this time.          Physical Exam:   Vitals were reviewed  Blood pressure 134/62, pulse 71, temperature (!) 96  F (35.6  C), temperature source Oral, resp. rate 18, weight 117 kg (257 lb 14.4 oz), SpO2 95 %.  GEN: In NAD  HEENT: NCAT; PERRL; sclerae non-icteric  LUNGS: CTAB  CV: RRR  ABD: +BSs; SNTND  EXT: No BLE edema  SKIN: No acute rashes noted on exposed areas.  NEURO: AAOx3; CNs grossly intact; No acute focal deficits noted.      ROUTINE LABS:  [facility]   Lab 03/24/23  0826 03/23/23  [id number]  03/23/23  0935 03/22/23  1813   GLC 96 94 102* 102*     CBCNo lab results found in last 7 days.  INRNo lab results found in last 7 days.  Arterial Blood GasNo lab results found in last 7 days.      Gilson Stanton PA-C  Internal Medicine Hospitalist   [facility]  [phone]

## 2023-03-24 NOTE — PROGRESS NOTES
SW emailed nephew and updated on what the county said.  They won't go any faster.   ZEB received an email from EW worker. Theresea needs to do an updated assessment.  Will do that on Tuesday.     NEREYDA Sharp   [facility], Transitional Care Unit   Social Work   [facility]th [place] [address]  (ph) [phone]

## 2023-03-24 NOTE — TELEPHONE ENCOUNTER
"Phoned patient to schedule his surgery with Dr Meyers and PAC appointment. Patient agreeable to schedule sugery for next available date of 4/7/23. Patient resisted scheduling a pre-op as he felt this could be done in his TCU. Patient reports to have had a very negative experience for his appointment at the [facility] on 3/20/23, as he had to wait a long time to see the surgeon, and then for another hour and a half after his visit for the ambulance to pick him up. Patient stated he \"cannot do that again.\"     Above information relayed to Dr Meyers's care team. Patient will wait for a call back from the clinic with next steps.    "

## 2023-03-24 NOTE — PLAN OF CARE
Pt is alert and oriented x 4. Able to make needs known. Denied chest pain, SOB, and N/V. Pt refused diclofenac and miconazole powder.Pt refused to assess skin on his back. NWB on LLE. Liko lift transfers. all light with in reach continue with plan of care.       Patient's most recent vital signs are:     Vital signs:  BP: 134/62  Temp: 96  HR: 71  RR: 18  SpO2: 95 %     Patient does not have new respiratory symptoms.  Patient does not have new sore throat.  Patient does not have a fever greater than 99.5.

## 2023-03-25 NOTE — PHARMACY-MEDICATION REGIMEN REVIEW
Pharmacy Medication Regimen Review  Waldo Bustos is a 72 year old male who is currently in the Transitional Care Unit.    Assessment: All medications have an appropriate indications, durations and no unnecessary use was found.    Plan:   Continue current medications/plan.    Attending provider will be sent this note for review.  If there are any emergent issues noted above, pharmacist will contact provider directly by phone.      Pharmacy will periodically review the resident's medication regimen for any PRN medications not administered in > 72 hours and discontinue them. The pharmacist will discuss gradual dose reductions of psychopharmacologic medications with interdisciplinary team on a regular basis.    Please contact pharmacy if the above does not answer specific medication questions/concerns.  Sasha Frausto, PharmD, BCPS    Background:  A pharmacist has reviewed all medications and pertinent medical history today.  Medications were reviewed for appropriate use and any irregularities found are listed with recommendations.      Current Facility-Administered Medications:      acetaminophen (TYLENOL) tablet 1,000 mg, 1,000 mg, Oral, Q8H PRN, Luis, Jem, DO, 1,000 mg at 03/20/23 1050     apixaban ANTICOAGULANT (ELIQUIS) tablet 5 mg, 5 mg, Oral, BID, Lang Blakely PA-C, 5 mg at 03/24/23 2129     atorvastatin (LIPITOR) tablet 40 mg, 40 mg, Oral, Daily, Mani Mcqueen MD, 40 mg at 03/24/23 0852     calcium carbonate (TUMS) chewable tablet 500 mg, 500 mg, Oral, TID PRN, Luis, Jem, DO, 500 mg at 12/31/22 1132     glucose gel 15-30 g, 15-30 g, Oral, Q15 Min PRN **OR** dextrose 50 % injection 25-50 mL, 25-50 mL, Intravenous, Q15 Min PRN **OR** glucagon injection 1 mg, 1 mg, Subcutaneous, Q15 Min PRN, Lang Blakely PA-C     diclofenac (VOLTAREN) 1 % topical gel 2 g, 2 g, Topical, 4x Daily, Mason Tomlinson MD, 2 g at 03/22/23 0953     liraglutide (VICTOZA) injection 1.8 mg, 1.8 mg, Subcutaneous,  Daily, Lang Blakely PA-C, 1.8 mg at 03/24/23 0855     metFORMIN (GLUCOPHAGE XR) 24 hr tablet 2,000 mg, 2,000 mg, Oral, QAM, Lang Blakely PA-C, 2,000 mg at 03/24/23 0852     methocarbamol (ROBAXIN) tablet 500 mg, 500 mg, Oral, 4x Daily PRN, Mani Mcqueen MD, 500 mg at 03/20/23 1050     miconazole (MICATIN) 2 % powder, , Topical, BID, Lang Blakely PA-C, Given at 03/24/23 2129     naloxone (NARCAN) injection 0.2 mg, 0.2 mg, Intravenous, Q2 Min PRN **OR** naloxone (NARCAN) injection 0.4 mg, 0.4 mg, Intravenous, Q2 Min PRN **OR** naloxone (NARCAN) injection 0.2 mg, 0.2 mg, Intramuscular, Q2 Min PRN **OR** naloxone (NARCAN) injection 0.4 mg, 0.4 mg, Intramuscular, Q2 Min PRN, Mani Mcqueen MD     Nurse may request from Pharmacy a change of form of medication (e.g. Liquid to tablet)., , Does not apply, Continuous PRN, Lang Blakely PA-C     ondansetron (ZOFRAN ODT) ODT tab 4 mg, 4 mg, Oral, Q6H PRN, Jem Smith DO, 4 mg at 12/31/22 0829     Patient is already receiving anticoagulation with heparin, enoxaparin (LOVENOX), warfarin (COUMADIN)  or other anticoagulant medication, , Does not apply, Continuous PRN, Lang Blakely PA-C     pioglitazone (ACTOS) tablet 45 mg, 45 mg, Oral, Daily, Lang Blakely PA-C, 45 mg at 03/24/23 0852     polyethylene glycol (MIRALAX) Packet 17 g, 17 g, Oral, Daily PRN, Beverly Johnson PA     senna-docusate (SENOKOT-S/PERICOLACE) 8.6-50 MG per tablet 2 tablet, 2 tablet, Oral, BID PRN, Beverly Johnson PA  No current outpatient prescriptions on file.

## 2023-03-25 NOTE — PLAN OF CARE
Goal Outcome Evaluation:    Pt is A&OX4, calm, & cooperative with care. Denied CP, SOB, & n/v. NWB to the LLE. A of 2 with Liko lift; was not OOB this shift. Continent for both B&B; uses bedpan for BM. Urinal at the bedside for bladder. Takes med whole with thin liquid. No acute issues overnight. Pt slept well throughout the night. Able to make needs known & call light within reach. Will continue with plan of care.    Patient's most recent vital signs are:     Vital signs:  BP: 136/68  Temp: 96.3  HR: 74  RR: 18  SpO2: 97 %     Patient does not have new respiratory symptoms.  Patient does not have new sore throat.  Patient does not have a fever greater than 99.5.

## 2023-03-25 NOTE — PLAN OF CARE
Goal Outcome Evaluation:    Pt is A&OX4, calm, & cooperative with care. Denied CP, SOB, & n/v. NWB to the LLE. A of 2 with Liko lift; was not OOB this shift. Continent for both B&B; uses bedpan for BM. Urinal at the bedside for bladder. Takes med whole with thin liquid. No acute issues this shift. Pt ate dinner with good appetite. Able to make needs known & call light within reach. Will continue with plan of care.    Patient's most recent vital signs are:     Vital signs:  BP: 136/68  Temp: 96.3  HR: 74  RR: 18  SpO2: 97 %     Patient does not have new respiratory symptoms.  Patient does not have new sore throat.  Patient does not have a fever greater than 99.5.

## 2023-03-25 NOTE — PLAN OF CARE
Pt is A&O x4. Able to make needs known. Denied chest pain, SOB, and N/V. Pt got up this morning at 1100. Incontinent of urine in bed.Pt refused to apply diclofenac and miconazole powder. C/o pain on neck.Administred PRN Tylenol,and Robaxin, and effective. Noted some redness on his buttocks , applied barrier cream. Eat lunch with good appetite. Pt is up in recliner after lunch. NWB on LLE. Liko lift transfers. all light with in reach continue with plan of care.    Patient's most recent vital signs are:     Vital signs:  BP: 136/65  Temp: 96  HR: 68  RR: 18  SpO2: 94 %     Patient does not have new respiratory symptoms.  Patient does not have new sore throat.  Patient does not have a fever greater than 99.5.

## 2023-03-26 NOTE — PLAN OF CARE
Goal Outcome Evaluation:        Pt was on the recliner at the beginning of the shift. Alert and oriented x 4. Able to make needs know to staffs. Pt is continent of both bowel an bladder. Pt denied having chest pain, SOB, N/V, fever and chills. Continue to be NWB on LLE. Liko transfer Appears comfortably sleeping at this time. Will contimnue will POC.      Patient's most recent vital signs are:     Vital signs:  BP: 121/59  Temp: 96.5  HR: 73  RR: 18  SpO2: 94 %     Patient does not have new respiratory symptoms.  Patient does not have new sore throat.  Patient does not have a fever greater than 99.5.

## 2023-03-27 NOTE — PLAN OF CARE
Problem: Skin Injury Risk Increased  Goal: Skin Health and Integrity  Description: Perform a full pressure injury risk assessment, as indicated by screening, upon admission to care unit.    Reassess skin (full inspection and injury risk, including skin temperature, consistency and color) frequently (e.g., scheduled interval, with change in condition) to provide optimal early detection and prevention.    Maintain adequate tissue perfusion (e.g., encourage fluid balance; avoid crossing legs, constrictive clothing or devices) to promote tissue oxygenation.    Maintain head of bed at lowest degree of elevation tolerated, considering medical condition and other restrictions. Use positioning supports to prevent sliding and friction. Consider low friction textiles    Outcome: Progressing   Goal Outcome Evaluation:  Patient woke up late in the morning. Did not want to do anything in the morning prefers to be on the chair in the evening.Take medication whole . Urinal is at bedside. Continue with current plan of care.    .Blood pressure 135/61, pulse 68, temperature (!) 96.5  F (35.8  C), temperature source Oral, resp. rate 16, weight 117 kg (257 lb 14.4 oz), SpO2 97 %.

## 2023-03-27 NOTE — PROGRESS NOTES
ZEB sent updated documentation to Clare Pond @ [facility] will meet with pt on 3/29/23 @ 3pm to update CSSP for EW waiver once MA is approved. Currently waiting on [facility] to approve MA case, everything else is complete.    ZEB emailed Gardenia @ [facility] to check again when she would be able to come out and meet with pt.     NEREYDA Shaffer  Post Acute Float   [facility]    Phone: [phone]  Fax: [phone]

## 2023-03-27 NOTE — PLAN OF CARE
Pt is alert and oriented x 4. Pt wake up this morning at 11am. Denied chest pain, SOB, and N/V. Denied pain.Pt refused to apply diclofenac and miconazole powder. Pt refused breakfast and lunch. Pt also refused to get up from bed to recliner.Able to make needs known. Call light with in reach.    Patient's most recent vital signs are:     Vital signs:  BP: 138/59  Temp: 96.9  HR: 71  RR: 16  SpO2: 98 %     Patient does not have new respiratory symptoms.  Patient does not have new sore throat.  Patient does not have a fever greater than 99.5.

## 2023-03-27 NOTE — PLAN OF CARE
Patient is alert and oriented x4. Makes needs known to staff. Continent of both bowel and bladder. Urinal at bedside. Bed rojas at request. Liko lift for transfers. Moved from room 426 to 405. Denied chest pain, N&V, and cough. Declined full skin assessment but buttocks appears red and skin peeling during lift transfers. Miconazole powder applied. Call-light within reach at all times. Will continue with POC.    Patient's most recent vital signs are:     Vital signs:  BP: 124/54  Temp: 96.1  HR: 71  RR: 16  SpO2: 96 %     Patient does not have new respiratory symptoms.  Patient does not have new sore throat.  Patient does not have a fever greater than 99.5.

## 2023-03-27 NOTE — PLAN OF CARE
Goal Outcome Evaluation:       Pt is alert and oriented x 4. Able to make needs known to staffs. Pt appears continent of both bowel and bladder with the use of urinal and bedpan. Denied having discomfort this shift. Ate 100% of meal this shift. Continue to be NWB on LLE. Utilizes the lift for transfers. No acute issue noted this shift. Will continue with POC      Patient's most recent vital signs are:     Vital signs:  BP: 135/61  Temp: 96.5  HR: 68  RR: 16  SpO2: 97 %     Patient does not have new respiratory symptoms.  Patient does not have new sore throat.  Patient does not have a fever greater than 99.5.

## 2023-03-27 NOTE — PROGRESS NOTES
ZEB received a call from  from [facility] stating they would not receive enough money from  to pay for care.  She asked if we could ask county for CADI.  ZEB said that will not happen as pt is 72 y.o.  ZEB will talk to Fatou about increase in funding when MA goes through.  Right now if no extra funding, [facility] is denying referral.     NEREYDA Sharp   [facility], Transitional Care Unit   Social Work   [facility] [place] [address]  ) [phone]

## 2023-03-28 NOTE — PLAN OF CARE
Pt woke up this morning at 11:15. Pt refused to order his lunch. Educated pt importance of diet.C/o pain on neck.Administred PRN Tylenol,and Robaxin, and effective. Pt is alert and oriented x 4.Denied chest pain, SOB, and N/V. Denied pain.Pt refused to apply diclofenac and miconazole powder. Pt refused to check skin on his buttocks.Able to make needs known. Call light with in reach.       Patient's most recent vital signs are:     Vital signs:  BP: 129/52  Temp: 95.7  HR: 66  RR: 16  SpO2: 95 %     Patient does not have new respiratory symptoms.  Patient does not have new sore throat.  Patient does not have a fever greater than 99.5.

## 2023-03-28 NOTE — PROGRESS NOTES
SURGERY PLAN (PRE-OP PLAN)    Patient Position (indicated by x):  x  Supine     Supine with torso rolled up on a bump     Floppy lateral on torso length bean bag     Lateral decubitus, bean bag, full length     Lateral decubitus, [place]son hip positioner     Safety paddle side supports x 2 clamped to side rail     Lithotomy, both legs in yellow padded leg montilla     Lithotomy, single leg in yellow padded leg montilla     Prone on blanket rolls/round gel pad     Prone on Sushil (arched) frame on Atif table     Single thigh in orange arthroscopy clamp     Beach chair semi recumbent     Spider limb positioner     Arm out on radiolucent arm table   x  Split drape with top bar     Revision BELLA drape with plastic side bags for leg     Extremity drape     Shoulder pack drape     Laparotomy drape     Tran catheter          General Equipment Requests (indicated by x):    x  C-Arm with C-Armor drape     C-Arm (video capable, [employer]00 model)     O-Arm with Stealth imaging     Fracture Table   x  Atif XR Table     SurgiGraphic 6000 (diving board) fluoro table     Cell saver   x  Luigi Biopsy trephine set w/ K-wire & pituitary rongeurs   x  Small pituitary rongeur   x  Luigi's angled curettes, narrow shaft   x  Ciro sleeve rongeur     Midas Yosvany Medtronic luis, electric motor     Phenol 5%     [place] BMAC stem cell     Vancomycin 1 gram powder     Zometa 4 mg vials     Depo Medrol steroid     Blunt Pelvic Retractor (.55, Blunt Hohmann with  slight bend)     (1) Portable hand held radiation detector machine for sentinel node biopsy and (2) Lymphazurin     [place] Osteotomes       Specimens and cultures (indicated by x):   x  Tissue cultures, aerobic and anaerobic without gram stain     Frozen section     pathology specimens - fresh     pathology specimens - formalin     Angelito Garcia MD  Adult Reconstructive Surgery Fellow  Department of Orthopaedic Surgery, [facility]

## 2023-03-28 NOTE — PLAN OF CARE
Goal Outcome Evaluation:  No acute issues overnight. Appears to be sleeping well and has no c/o's. Continent using urinal indep.- staff empties. NWB LLE - liko transfers.        Patient's most recent vital signs are:     Vital signs:  BP: 124/54  Temp: 96.1  HR: 71  RR: 16  SpO2: 96 %     Patient does not have new respiratory symptoms.  Patient does not have new sore throat.  Patient does not have a fever greater than 99.5.

## 2023-03-28 NOTE — PLAN OF CARE
Goal Outcome Evaluation:      Pt in bed, AOx4, laying in bed during the four hours shift.  Use a urinal to void, takes meds whole, regular thin, use Voltaren jell, tylenol and robaxin.         Patient's most recent vital signs are:      Vital signs:  /52 (BP Location: Right arm)   Pulse 66   Temp (!) 95.7  F (35.4  C) (Oral)   Resp 16   Wt 117 kg (257 lb 14.4 oz)   SpO2 95%   BMI 37.00 kg/m       There is not changed in patient noted during this shift.

## 2023-03-29 NOTE — PROGRESS NOTES
ZEB received a call from nephew/Pollo.  The [facility] sent him a letter.  It about the cars.  Pollo needs to get a dealer involved to get the correct prices on the cars.  One title went to niece but she has since  and unsure if it was changed. ZEB suggested Pollo ask dealer what to do in that case.  Pollo is going out of the country from  until .  His dad, Zeferino is going to be ZEB's contact if there are issues.  [phone].  Zeferino is going to continue to work on this. Gracia said pt's spenddown is $21,000 and pt only has $8,000 of now even less.    NEREYDA Sharp   [facility], Transitional Care Unit   Social Work   [address] [place] [address], 4th [place] [address]  () [phone]

## 2023-03-29 NOTE — PLAN OF CARE
[id number]    Alert and orientedx4, A2 lift. Makes needs known.  Pt denied pain during evening.  No acute changes, continue to monitor.

## 2023-03-29 NOTE — PLAN OF CARE
Alert and orientedx4, A2 lift. Makes needs known.  Pt denied pain during day shift.  No acute changes, continue to monitor.

## 2023-03-29 NOTE — PROGRESS NOTES
"[facility] Transitional Care    Medicine Progress Note - Hospitalist Service    Date of Admission:  12/24/2022    Assessment & Plan   aWldo \"Don\" BIANCA Bustos is a 72 year old male with history of morbid obesity, DMII, HLD, JAIR, PE/DVT on chronic apixaban, venous insufficiency, OA, and chronic L hip prosthetic joint infection who was admitted to [facility] 11/22/22 for scheduled explantation of L hip prosthesis, debridement, and reconstruction with antibiotic cement spacer. Course c/b acute encephalopathy (2/2 anesthesia and sedating meds), acute hypoxic respiratory failure (2/2 OHS), acute blood loss anemia, HALEY, and profound physical deconditioning. Tx to TCU for ongoing rehabilitation. He is now awaiting community placement after not making progress at TCU.     Chronic left hip PJI s/p explantation, debridement, and reconstruction with antibiotic spacer (11/22/22)  Per Dr. Meyers. Surgical cx +for Finegoldia magna and Proteus mirabilis (also had C. acnes in the past but doing well despite no directed treatment of this). S/p 6 wk course of PO Cipro and Flagyl per ID ended 1/3/23. Higher risk for treatment failure given comorbidities, sinus tract, and history of PJI. Saw Dr. Meyesr 1/9/23 and completed 8 wk antibiotic free holiday. L hip aspiration was attempted by IR 3/6 but no fluid could be obtained. Last saw Dr. Meyers 3/20, plan is for OP biopsy under general anesthesia 4/7. OP biopsy needs to be completed prior to final decision about proceeding with second stage reimplantation BELLA.   -NWB LLE  -Continue to encourage getting up to chair at least 1x/day    Preoperative Risk Assessment   Patient with hx of Diabetes Mellitus currently well control with recent A1c 5.8, JAIR, PE/DVT (chronically anticoagulated), CKD (recent Cr 0.9-1.0), high risk surgery. No hx of TIA, CVA or seizures. Using the Revised Cardiac Risk Index, patient has a 3.9% risk of perioperative cardiac complications. CMP stable, CBC with " stable mild anemia with hgb 11.0. Reviewed Echo 4/4/2022 with EF 65-70%, normal LV size and systolic function, no regional wall motion abnormalities, mild left ventricular hypertrophy, RV normal size and function, normal PA pressures. Abnormal stress test 2013, possible artifact. Patient has has no recent chest pain, SOB, dyspnea. Activity is very limited. METs <4 given Non weight bearing.   -Pre-op EKG pending   -Hold Eliquis 3 days prior to surgery   -Hold metformin, pioglitazone and Liraglutide day of surgery, monitor glucose q4 hrs while NPO and use medium sliding scale inulin as needed for hyperglycemia   -NPO at [place] 4/7      Other Medical Issues:  Physical deconditioning: PT, OT signed off as patient unwilling to mobilize due to pain. Had been doing several months of in-bed therapy. SW looking for community placement.   DMII: A1c 5.8 3/6/23. Currently on Metformin, Pioglitazone, and Victoza 1.8 mg daily. Glucose stable. Continue metformin, Victoza (being used here in place of PTA Trulicity), and Pioglitazone. No plan to resume home glipizide (not needed and has risk for hypoglycemia).  OSH: Continue CPAP.  R hand numbness: Prior issue, appears resolved. See notes 3/16 and prior for details. Consider OP neurology consult if recurrent symptoms.   H/o DVT, PE: No acute concerns. Continue Apixaban.  Chronic neck pain: Stable. Continue diclofenac gel with rare PRN Tylenol, Robaxin use.  HLD: Continue statin.       Diet: Regular Diet Adult  Snacks/Supplements Adult: Other; Please allow pt/RN to order snacks/supplements PRN; Between Meals    DVT Prophylaxis: DOAC  Tran Catheter: Not present  Lines: None     Cardiac Monitoring: None  Code Status: Full Code      Clinically Significant Risk Factors                         # Moderate Malnutrition: based on nutrition assessment        Disposition Plan    Unknown.     The patient's care was discussed with the Attending Physician, Dr. Andrade and Patient.    Beverly AGUILLON  "MIRANDA Johnson  Hospitalist Service  [facility] Transitional [facility]  Securely message with Kelley (more info)  Text page via [facility] Paging/Directory   ______________________________________________________________________    Interval History   No acute events overnight.  Patient reports feeling okay.  He expresses frustration with prolonged hospitalization and TCU stay.  He is encouraged by biopsy on schedule for next week.  He denies any recent fevers, chills, chest pain, shortness of breath, dyspnea, lightheadedness, palpitations, racing heart, nausea, vomiting, abdominal pain, bowel or bladder issues.  He notes he is has minimal activity given left hip nonweightbearing.  Denies any complications from anesthesia previously, however has been told it \"sometimes takes me longer to wake up.\"    Physical Exam   Blood pressure 128/68, pulse 72, temperature (!) 96.1  F (35.6  C), temperature source Oral, resp. rate 16, weight 117 kg (257 lb 14.4 oz), SpO2 98 %.  GENERAL: Alert and oriented x 3. NAD.  HEENT: Anicteric sclera. PERRL. Mucous membranes moist and without lesions. Poor dentition.   CV: RRR. S1, S2. No murmurs appreciated.   RESPIRATORY: Effort normal on RA. Lungs CTAB with no wheezing, rales, rhonchi.   GI: Abdomen soft, obese and non distended, bowel sounds present. No tenderness, rebound, guarding.   MUSCULOSKELETAL: No joint swelling or tenderness. Moves all extremities.   NEUROLOGICAL: No focal deficits.   EXTREMITIES: No peripheral edema. Intact bilateral pedal pulses.   SKIN: No jaundice. No rashes. '    Medical Decision Making     45 MINUTES SPENT BY ME on the date of service doing chart review, history, exam, documentation & further activities per the note.      Data   Recent Labs   Lab 03/28/23  1740 03/28/23  1158 03/27/23  1733   * 105* 90     "

## 2023-03-29 NOTE — PLAN OF CARE
Goal Outcome Evaluation:  No acute issues overnight. Sleeping off and on - awake and watching a movie during rounds @ 0200. Had no c/o's @ that time. Continent using urinal indep- staff empties. LBM yesterday using bedpan. Remains NWB LLE and liko lift transfers.        Patient's most recent vital signs are:     Vital signs:  BP: 122/58  Temp: 96.8  HR: 56  RR: 16  SpO2: 97 %     Patient does not have new respiratory symptoms.  Patient does not have new sore throat.  Patient does not have a fever greater than 99.5.

## 2023-03-30 NOTE — PLAN OF CARE
Goal Outcome Evaluation:  Pt slept throughout the night.    Orientation: Aox4  Bowel: Continent, uses bedpapn  Bladder: Continent, uses urinal  Ambulation/Transfers: Ax2 with lift  Diet/ Liquids:Regular, thin liq  Tubes/ Lines/ Drains: None  Skin: Buttocks/sacral

## 2023-03-30 NOTE — PLAN OF CARE
VS: Blood pressure 128/68, pulse 72, temperature (!) 96.1  F (35.6  C), temperature source Oral, resp. rate 16, weight 117 kg (257 lb 14.4 oz), SpO2 98 %.     O2: 98% on RA.   Output: Pt uses bedside urinal.    Last BM: 03/30/23.   Activity: A2 with liko lift.    Pain: Denied   CMS: AOX4 Denies pain, new numbness, tingling, SOB and CP. Pt has baseline numbness in R hand.    Diet: Regular diet   LDA: None   Plan: Continue POC.   Additional Info: Pt refused repositioning and skin assessment. Stated he was taking a shower and staff could look then.

## 2023-03-31 NOTE — PLAN OF CARE
The patient is alert and oriented x 3. VSS. Able to make needs known. Denies HA, SOB, chest pain, dizziness or N/V. Patient declined turning and repositioning. Educated patient on pressure injury prevention and encourage to shift weight to ease pressure on his buttock. Continent of bowel and bladder. Bed pan for toileting and uses urinal by the bedside appropriately. Transfer with liko lift. Continue to monitor for comfort    Patient's most recent vital signs are:     Vital signs:  BP: 127/66  Temp: 95.6  HR: 75  RR: 16  SpO2: 98 %     Patient does not have new respiratory symptoms.  Patient does not have new sore throat.  Patient does not have a fever greater than 99.5.

## 2023-03-31 NOTE — PLAN OF CARE
Goal Outcome Evaluation:    Pt is A&OX4, calm, & cooperative with care. Denied CP, SOB, & n/v. NWB to the LLE. A of 2 with Liko lift; was not OOB this shift. Continent for both B&B; uses bedpan for BM. Urinal at the bedside for bladder. Takes med whole with thin liquid. No acute issues overnight. Pt slept well throughout the night. Able to make needs known & call light within reach. Will continue with POC.    Patient's most recent vital signs are:     Vital signs:  BP: 129/59  Temp: 97.7  HR: 75  RR: 16  SpO2: 97 %     Patient does not have new respiratory symptoms.  Patient does not have new sore throat.  Patient does not have a fever greater than 99.5.

## 2023-03-31 NOTE — PROGRESS NOTES
CLINICAL NUTRITION SERVICES - REASSESSMENT NOTE     Nutrition Prescription    RECOMMENDATIONS FOR MDs/PROVIDERS TO ORDER:  None at this time.    Malnutrition Status:    Patient does not meet two of the established criteria necessary for diagnosing malnutrition    Recommendations already ordered by Registered Dietitian (RD):  - Discontinue sending Ensure Max daily or every other day, due to large stock in pt's room. Pt able to order supplements PRN.  - Encourage meals brought from home or outside that pt enjoys if possible to increase variety of meals.    Future/Additional Recommendations:  Monitor labs, intakes, and weight trends.     EVALUATION OF THE PROGRESS TOWARD GOALS   Diet: Regular diet  Snacks/Supplements: Pt able to order snacks/supplements PRN    Intake:   Frequently consuming 100% of meals per flowsheet. On average over the past week, pt is ordering 2370 kcal and 116 g protein daily per [employer] (including supplements ordered). If pt is consuming 100% meals + 50% supplements consistently, this is likely meeting more than 100% minimum energy and protein needs.       NEW FINDINGS   Patient reported that he is eating fine, but he dislikes many of the food options available. He has been ordering the same meals every day. He stated that he typically drinks 50% of an Ensure Max for breakfast and 100% of two other meals/day. Patient reported that he has been receiving Ensure Max every day or every other day, even when he does not order it. He does not currently have a daily supplement order. Patient has several unopened Ensure in his room.     GI: Patient has been stooling regularly.    Weight: No updated weight since 3/23. Weight appears to be relatively stable throughout admission. As of 3/23, patient was down 4 lbs from admission weight, which would be a loss of 1.5% in 3 months (not significant). Potential inaccuracies with bed scale measurements and fluid involvement.    Labs reviewed.   03/03/23 13:13  03/06/23 07:33 03/30/23 10:54   Glucose 104 (H) 114 (H) 98      11/23/22 06:12 03/06/23 07:33   Hemoglobin A1C 6.8 (H) 5.8 (H)       Medications reviewed:  - Victoza, 1.8 mg daily  - Metformin, 2000 mg daily  - Pioglitazone, 45 mg daily      MALNUTRITION  % Intake: No decreased intake noted  % Weight Loss: Weight loss does not meet criteria  Subcutaneous Fat Loss: None observed  Muscle Loss: Upper arm (bicep, tricep): moderate, Upper leg (quadricep, hamstring): mild-moderate and Posterior calf: mild-moderate  Fluid Accumulation/Edema: Mild generalized, trace in BLEs -- not using as indicator for malnutrition  Malnutrition Diagnosis: Patient does not meet two of the established criteria necessary for diagnosing malnutrition    Previous Goals   Patient to consume % of nutritionally adequate meal trays TID, or the equivalent with supplements/snacks.  Evaluation: Met    Previous Nutrition Diagnosis  Predicted inadequate nutrient intake (protein-energy) related to eating mostly 100% of meals documented the past week with good appetite but potential for PO intake decline.  Evaluation: No change    CURRENT NUTRITION DIAGNOSIS  Predicted inadequate nutrient intake (protein-energy) related to eating mostly 100% of meals documented the past week with good appetite but potential for PO intake decline.      INTERVENTIONS  Implementation  - Medical food supplement therapy - Discontinue sending Ensure Max daily or every other day, due to large stock in pt's room. Pt able to order supplements PRN.  - Modify composition of meals/snacks - Encourage meals from home or outside that pt enjoys if possible to increase variety of meals.    Goals  Patient to consume % of nutritionally adequate meal trays TID, or the equivalent with supplements/snacks.    Monitoring/Evaluation  Progress toward goals will be monitored and evaluated per protocol.      Mona Fischer  Dietetic Intern

## 2023-03-31 NOTE — PLAN OF CARE
"Patient is alert and oriented x4. Makes needs known to staff. Continent of both bowel and bladder. Urinal at bedside. Liko lift for transfers. Denied chest pain, N&V, and cough. Declined full skin assessment. Per PA note from today patient is to have \"OP biopsy under general anesthesia 4/7\" with Dr Meyers. Call-light within reach at all times. Will continue with POC.    Patient's most recent vital signs are:     Vital signs:  BP: 129/59  Temp: 97.7  HR: 75  RR: 16  SpO2: 97 %     Patient does not have new respiratory symptoms.  Patient does not have new sore throat.  Patient does not have a fever greater than 99.5.        "

## 2023-04-01 NOTE — PLAN OF CARE
Patient is alert and oriented x4. Makes needs known to staff. Continent of both bowel and bladder. Urinal at bedside. Liko lift for transfers. Denied chest pain, N&V, and cough. Declined full skin assessment but allowed staff to clean and re-center him in bed. Bottom appears very red with one pin sized scabbed spot on right buttock. Refused both barrier cream and miconazole powder. Stated he doesn't want anything on his skin. Refused both taz and draw sheet as well. Call-light within reach at all times. Will continue with POC    Patient's most recent vital signs are:     Vital signs:  BP: 121/65  Temp: 96.9  HR: 72  RR: 18  SpO2: 97 %     Patient does not have new respiratory symptoms.  Patient does not have new sore throat.  Patient does not have a fever greater than 99.5.

## 2023-04-01 NOTE — PROGRESS NOTES
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 Course c/b acute encephalopathy (2/2 anesthesia and sedating meds), acute hypoxic respiratory failure (2/2 OHS), acute blood loss anemia, HALEY, and profound physical deconditioning. Tx to TCU for ongoing rehabilitation. He is now awaiting community placement after not making progress at TCU.     Chronic left hip PJI s/p explantation, debridement, and reconstruction with antibiotic spacer (11/22/22)  Per Dr. Meyers. Surgical cx +for Finegoldia magna and Proteus mirabilis (also had C. acnes in the past but doing well despite no directed treatment of this). S/p 6 wk course of PO Cipro and Flagyl per ID ended 1/3/23. Higher risk for treatment failure given comorbidities, sinus tract, and history of PJI. Saw Dr. Meyers 1/9/23 and completed 8 wk antibiotic free holiday. L hip aspiration was attempted by IR 3/6 but no fluid could be obtained. Last saw Dr. Meyers 3/20, plan is for OP biopsy under general anesthesia 4/7. OP biopsy needs to be completed prior to final decision about proceeding with second stage reimplantation BELLA.   -NWB LLE  -Continue to encourage getting up to chair at least 1x/day     Preoperative Risk Assessment   Patient with hx of Diabetes Mellitus currently well control with recent A1c 5.8, JAIR, PE/DVT (chronically anticoagulated), CKD (recent Cr 0.9-1.0), high risk surgery. No hx of TIA, CVA or seizures. Using the Revised Cardiac Risk Index, patient has a 3.9% risk of perioperative cardiac complications. CMP stable, CBC with " stable mild anemia with hgb 11.0. Reviewed Echo 4/4/2022 with EF 65-70%, normal LV size and systolic function, no regional wall motion abnormalities, mild left ventricular hypertrophy, RV normal size and function, normal PA pressures. Abnormal stress test 2013, possible artifact. Patient has has no recent chest pain, SOB, dyspnea. Activity is very limited. METs <4 given Non weight bearing.   -Pre-op EKG pending   -Hold Eliquis 3 days prior to surgery   -Hold metformin, pioglitazone and Liraglutide day of surgery, monitor glucose q4 hrs while NPO and use medium sliding scale inulin as needed for hyperglycemia   -NPO at MN 4/7     Addendum:   EKG: Sinus rhythm with 1st degree AV block (chronic, unchanged).      4/4/22 Echo, bubble   Final Conclusion Previous Study: 9/20/18   Visually Estimated EF: 65-70%   Normal LV size and systolic function with estimated ejection fraction of 65-70%.   No obvious regional wall motion abnormalities.   Mild left ventricular hypertrophy.   The right ventricle is normal in size and function.   Pulmonary artery acceleration time of >100 ms suggests normal PA pressures.   Upper limits of normal IVC size with normal collapsibility on inspiration.       Other Medical Issues:  Physical deconditioning: PT, OT signed off as patient unwilling to mobilize due to pain. Had been doing several months of in-bed therapy. SW looking for community placement.   DMII: A1c 5.8 3/6/23. Currently on Metformin, Pioglitazone, and Victoza 1.8 mg daily. Glucose stable. Continue metformin, Victoza (being used here in place of PTA Trulicity), and Pioglitazone. No plan to resume home glipizide (not needed and has risk for hypoglycemia).  OSH: Continue CPAP.  R hand numbness: Prior issue, appears resolved. See notes 3/16 and prior for details. Consider OP neurology consult if recurrent symptoms.   H/o DVT, PE: No acute concerns. Continue Apixaban.  Chronic neck pain: Stable. Continue diclofenac gel with rare PRN  Tylenol, Robaxin use. Trial of lidocaine patch PRN ordered 4/1.   HLD: Continue statin.       Diet: Regular Diet Adult  Snacks/Supplements Adult: Other; Please allow pt/RN to order snacks/supplements PRN; Between Meals    DVT Prophylaxis: DOAC  Tran Catheter: Not present  Lines: None     Cardiac Monitoring: None  Code Status: Full Code      Clinically Significant Risk Factors                         # Moderate Malnutrition: based on nutrition assessment        Disposition Plan   Unknown      The patient's care was discussed with the Patient.    MIRANDA Rowland  Hospitalist Service  [facility]  Securely message with [employer] (more info)  Text page via [facility] Paging/Directory   ______________________________________________________________________    Interval History   No new physical complaints. Patient reports ongoing chronic neck pain. Discussed lidocaine patch and heating bad. Patient interested in trial of lidocaine patch. Some relief from tylenol and robaxin. Denies any fevers, chills, nausea, vomiting, chest pain, lightheadedness, dizziness, abdominal pain, nausea, vomiting, bowel or bladder issues.     Physical Exam   Vital Signs: Temp: 96.9  F (36.1  C) Temp src: Oral BP: 121/65 Pulse: 72   Resp: 18 SpO2: 97 % O2 Device: None (Room air)    Weight: 257 lbs 14.4 oz  GENERAL: Alert and oriented x 3. NAD.  HEENT: Anicteric sclera. PERRL. Mucous membranes moist and without lesions. Poor dentition.   CV: RRR. S1, S2. No murmurs appreciated.   RESPIRATORY: Effort normal on RA. Lungs CTAB with no wheezing, rales, rhonchi.   GI: Abdomen soft, obese and non distended, bowel sounds present. No tenderness, rebound, guarding.   MUSCULOSKELETAL: No joint swelling or tenderness. Moves all extremities.   NEUROLOGICAL: No focal deficits.   EXTREMITIES: No peripheral edema. Intact bilateral pedal pulses.   SKIN: No jaundice. No rashes. '    Medical Decision Making       35 MINUTES SPENT BY ME on the  date of service doing chart review, history, exam, documentation & further activities per the note.      Data   Recent Labs   Lab 03/31/23  2138 03/31/23  1734 03/31/23  0826 03/30/23  1121 03/30/23  1054   WBC  --   --   --   --  8.5   HGB  --   --   --   --  11.0*   MCV  --   --   --   --  94   PLT  --   --   --   --  307   NA  --   --   --   --  140   POTASSIUM  --   --   --   --  4.5   CHLORIDE  --   --   --   --  104   CO2  --   --   --   --  28   BUN  --   --   --   --  37.8*   CR  --   --   --   --  0.99   ANIONGAP  --   --   --   --  8   MAIKOL  --   --   --   --  9.5   * 97 106*   < > 98   ALBUMIN  --   --   --   --  3.7   PROTTOTAL  --   --   --   --  6.8   BILITOTAL  --   --   --   --  0.2   ALKPHOS  --   --   --   --  80   ALT  --   --   --   --  13   AST  --   --   --   --  12    < > = values in this interval not displayed.

## 2023-04-01 NOTE — PLAN OF CARE
0900 to 1530   Refused skin check and repositioning this shift. Says it hurts his neck to much when he turns. Has ceiling lift sling under him in bed. Declined to have me reposition it. Refused to get OOB this shift. Tylenol and Robaxin given for pain with moderate relief. Warm Aqua pad to neck which gave some relief of neck pain. Uses urinal and staff empties.

## 2023-04-01 NOTE — PLAN OF CARE
Goal Outcome Evaluation:  No acute issues overnight. Appears to be sleeping well and has no c/o's or requests as of yet. Continent using urinal indep.- staff empties. Remains NWB LLE - liko lift transfers.        Patient's most recent vital signs are:     Vital signs:  BP: 121/65  Temp: 96.9  HR: 72  RR: 18  SpO2: 97 %     Patient does not have new respiratory symptoms.  Patient does not have new sore throat.  Patient does not have a fever greater than 99.5.

## 2023-04-02 NOTE — PLAN OF CARE
Goal Outcome Evaluation:    Patient is alert and oriented x 4. He is able to make his needs known. Pt denied SOB and chest pain. He is able to swallow without difficulty. Pt requested for PRN Tylenol and Robaxin for pain level of 8 out of 10. He refused Voltaren gel and miconazole powder. Pt also refused to reposition.    Patient's most recent vital signs are:     Vital signs:  BP: 120/66  Temp: 97.6  HR: 78  RR: 18  SpO2: 97 %     Patient does not have new respiratory symptoms.  Patient does not have new sore throat.  Patient does not have a fever greater than 99.5.

## 2023-04-02 NOTE — PLAN OF CARE
The patient is alert and oriented x 3. VSS. Able to make needs known. Denies HA, SOB, chest pain, dizziness or N/V. This morning, medicated with Tylenol and Robaxin for left hip pain control. Patient declined turning and repositioning. Continue to educated patient on pressure injury prevention and encourage to shift weight to ease pressure on his buttock. Continent of bowel and bladder. Bed pan for toileting and uses urinal by the bedside appropriately. Transfer with liko lift. Declined shower or bed bath today. Tylenol x 1 tonight for left hip pain. No acute changes noted. Continue to monitor for comfort    Patient's most recent vital signs are:     Vital signs:  BP: 120/66  Temp: 96.5  HR: 77  RR: 18  SpO2: 96 %     Patient does not have new respiratory symptoms.  Patient does not have new sore throat.  Patient does not have a fever greater than 99.5.

## 2023-04-02 NOTE — PLAN OF CARE
Goal Outcome Evaluation:  No acute issues overnight. Appears to be sleeping well and has no c/o's or requests as of yet. Uses urinal indep.- staff empties. Has Lidoderm patches daniella.shoulder area - off in AM.        Patient's most recent vital signs are:     Vital signs:  BP: 120/66  Temp: 97.6  HR: 78  RR: 18  SpO2: 97 %     Patient does not have new respiratory symptoms.  Patient does not have new sore throat.  Patient does not have a fever greater than 99.5.

## 2023-04-03 NOTE — PLAN OF CARE
Goal Outcome Evaluation:  No acute issues overnight. Awake during 0200 rounds - on phone and watching TV. Denied pain, discomfort, CP/SOB @ that time. Declined repositioning when offered. Scheduled for Arthrocentesis 04/07.        Patient's most recent vital signs are:     Vital signs:  BP: 136/69  Temp: 96.3  HR: 72  RR: 18  SpO2: 96 %     Patient does not have new respiratory symptoms.  Patient does not have new sore throat.  Patient does not have a fever greater than 99.5.

## 2023-04-03 NOTE — PLAN OF CARE
Goal Outcome Evaluation:         Pt alert and oriente X4, able to make needs known. No c/o chest pain, SOB, N/V. Pt was in his recliner during lunch. Visited by a friend. Declined Voltaren gel application. VSS. Scheduled for thoracentesis on 04/07. Will continue with POC.                 Patient's most recent vital signs are:     Vital signs:  BP: 131/73  Temp: 96.6  HR: 70  RR: 18  SpO2: 94 %     Patient does not have new respiratory symptoms.  Patient does not have new sore throat.  Patient does not have a fever greater than 99.5.

## 2023-04-04 NOTE — PLAN OF CARE
Goal Outcome Evaluation:  Pt.has been sleeping well throughout shift as noted during rounds. Has no c/o's or requests as of yet. Continent using urinal indep.- staff empties. Remains NWB LLE / liko lift transfers. Arthrocentesis scheduled for this Fri.04/07.        Patient's most recent vital signs are:     Vital signs:  BP: 133/58  Temp: 97.8  HR: 84  RR: 18  SpO2: 93 %     Patient does not have new respiratory symptoms.  Patient does not have new sore throat.  Patient does not have a fever greater than 99.5.

## 2023-04-04 NOTE — PLAN OF CARE
The patient is alert and oriented x 3. VSS. Able to make needs known. Denies HA, SOB, chest pain, dizziness or N/V. This morning, medicated with Tylenol and Robaxin for left hip pain control. Patient declined turning and repositioning. Educated patient on pressure injury prevention and encourage to shift weight to ease pressure on his buttock. Continent of bowel and bladder. Bed pan for toileting and uses urinal by the bedside appropriately. Transfer with liko lift. Continue to monitor for comfort        Patient's most recent vital signs are:     Vital signs:  BP: 126/66  Temp: 97.1  HR: 73  RR: 16  SpO2: 96 %     Patient does not have new respiratory symptoms.  Patient does not have new sore throat.  Patient does not have a fever greater than 99.5.

## 2023-04-04 NOTE — PLAN OF CARE
Goal Outcome Evaluation:       Pt A&O x4, on RA, VSS. Denies SOB, CP, fever or chills. Complains of pain to neck and coccyx. Given tylenol and robaxin. Pt states not effective. Declined Voltaren gel. lidocaine patches, ice or hot packs. Refused repo x3. Not OOB this shift. Good appetite. Scheduled for  arthrocentesis on 4/7.Continue POC.         Patient's most recent vital signs are:     Vital signs:  BP: 133/58  Temp: 97.8  HR: 84  RR: 18  SpO2: 93 %     Patient does not have new respiratory symptoms.  Patient does not have new sore throat.  Patient does not have a fever greater than 99.5.

## 2023-04-05 NOTE — PLAN OF CARE
The patient is alert and oriented x 3. VSS. Able to make needs known. Denies HA, SOB, chest pain, dizziness or N/V. This morning, medicated with Tylenol and Robaxin for left hip pain control. Patient declined turning and repositioning. Educated patient on pressure injury prevention and encourage to shift weight to ease pressure on his buttock. Continent of bowel and bladder. Bed pan for toileting and uses urinal by the bedside appropriately. Transfer with liko lift. Patient had a shower this morning. No acute changes noted. Continue to monitor for comfort    Patient's most recent vital signs are:     Vital signs:  BP: 120/63  Temp: 96  HR: 69  RR: 16  SpO2: 95 %     Patient does not have new respiratory symptoms.  Patient does not have new sore throat.  Patient does not have a fever greater than 99.5.

## 2023-04-05 NOTE — PLAN OF CARE
Goal Outcome Evaluation:  Pt.A&Ox4. Awake initial rounds, on phone and watching TV. Denied pain, discomfort, CP/SOB or any other c/o's @ that time. Indep.w/HS cares after set-up. Continent using urinal indep.- staff empties. NWB LLE / liko lift transfers. Arthrocentesis on Fri.04/07.         Patient's most recent vital signs are:     Vital signs:  BP: 135/53  Temp: 95.7  HR: 74  RR: 16  SpO2: 97 %     Patient does not have new respiratory symptoms.  Patient does not have new sore throat.  Patient does not have a fever greater than 99.5.

## 2023-04-05 NOTE — PLAN OF CARE
Goal Outcome Evaluation:          VS: /53 (BP Location: Right arm)   Pulse 74   Temp (!) 95.7  F (35.4  C) (Oral)   Resp 16   Wt 117 kg (257 lb 14.4 oz)   SpO2 97%   BMI 37.00 kg/m     O2: RA   Output: Voids spontaneously handheld urinal   Last BM: 4/4 loose large, bedpan   Activity: Assist of 2 liko lift   Skin: wdl except raw redness on perineum   Pain: Pain managed with tylenol and robaxin   CMS: Intact, AOx4. Denies numbness and tingling.   Dressing: None   Diet: Regular diet   LDA: None   Equipment: Personal belongings   Plan: Continue POC   Additional Info:

## 2023-04-06 NOTE — PLAN OF CARE
The patient is alert and oriented x 3. VSS. Able to make needs known. Denies HA, SOB, chest pain, dizziness but endorsed neck and shoulder pain. Declined Lidoderm patches or diclofenac as scheduled. Medicated with Tylenol and Robaxin. Informed the provider of the patient's c/o intermittent neck and shoulder pain rated 8/10. See order for Tramadol prn. Continent of bowel and bladder. Appetite is fair. Continue to decline turning and repositioning. Continue to monitor for comfort.         Patient's most recent vital signs are:     Vital signs:  BP: 124/62  Temp: 96.4  HR: 75  RR: 18  SpO2: 96 %     Patient does not have new respiratory symptoms.  Patient does not have new sore throat.  Patient does not have a fever greater than 99.5.

## 2023-04-06 NOTE — PROGRESS NOTES
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 Hospital stay was c/b acute encephalopathy (attributed to anesthesia and sedating meds), acute hypoxic respiratory failure (attributed to obesity hypoventilation syndrome), acute blood loss anemia, HALEY (thought pre renal), and profound physical deconditioning. Transferred to TCU for ongoing rehabilitation. He is now awaiting community placement after not making progress here.     #Physical Debility. PT/OT no longer working with patient after several months of in-bed therapy and refusals to mobilize. SW is following for community placement but this has been quite challenging.    #Chronic Left Hip PJI s/p explantation, debridement, and reconstruction with antibiotic spacer (11/22/22). Dr. Meyers. Surgical cultures grew Finegoldia magna and Proteus mirabilis (also had C. acnes in the past but doing well despite no directed treatment of this). S/p 6 wk course of PO Cipro and Flagyl per ID ended 1/3/23 - noted potential higher risk for treatment failure given comorbidities, sinus tract, and history of PJI. Saw Dr. Meyers 1/9/23 and completed 8 wk antibiotic free holiday after which time left hip aspiration was attempted by IR on 3/6 but no fluid could be obtained. Saw Dr. Meyers 3/20 w/ plan for biopsy under anesthesia tomorrow to guide decision making about candidacy for proceeding w/ reimplantation of hip.   - NWB LLE.   - Encouraging up to chair daily, currently doing this approximately every other day via Baudilio lift.   - Not requiring pain meds " for hip.   - To OR tomorrow for same day surgery (biopsy) and then will return to TCU and await results and recommendations from Dr. Meyers. Pre op clearance was done in 4/1 medicine progress note.     #Acute on Chronic Neck Pain.   #Intermittent Numbness and Tingling in Right Fingers.   Longstanding neck pain previously followed OP w/o recent imaging on file. Currently appears exacerbated muscle tightness/spasm from lying in bed. Has reported right 2nd-4th finger intermittent numbness and tingling at TCU - suspicion for compressive neuropathy d/t positioning off left hip in bed and/or cervical radiculopathy. He was not interested in workup beyond prior x-ray as this is currently minimally bothersome, but he would like to try something more for neck pain.   - Continue current Tylenol and Robaxin. Discussed rotating to another muscle relaxant but it sounds like he may not have tolerated Flexeril in the past.   - Will start PRN Tramadol as this worked well for patient in the past. Prefer not to restart prior oxycodone as this was for acute post op hip pain and patient's neck pain is chronic.   - Topicals not helpful.   - Advised heating pad followed by gentle stretching when he is up in the chair.   - Discussed potential gabapentin trial pending course.     #DM II. HgbA1C has improved from 6.8 to 5.8% while here. BG well controlled.   - Continue home metformin.   - Continue Victoza being used here in place of PTA Trulicity.   - Continue home Actos but low threshold to stop this if any issues with hypoglycemia.   - No plan to resume home glipizide (not needed and has risk for hypoglycemia).     #JAIR. Continue home CPAP.     #Hx DVT/PE. Nothing recent. Apixaban on hold for procedure, resume after.     CODE: full  DVT: apixaban on hold for procedure, resume after  Diet: regular (NPO prior to procedure tomorrow)  Indications for Psychotropic Medications: N/A  Disposition: not working with therapies due to lack of progress,  awaiting [facility] placement - placement has been very challenging and patient remains hopeful he will be cleared to proceed w/ reimplantation of hip    Liliya Lai PA-C  Hospitalist Service  Pager: [phone]  ________________________________________________________________    Subjective & Interval History:  Chief complaint of acute on chronic neck pain. Located in the right neck muscles into the shoulder muscles and worse with movement. Has had this chronically but worse with lying in bed so much. Has some intermittent numbness and tingling in the middle three fingers on the right hand but this is also longstanding. Incomplete relief with current Tylenol and Robaxin. Thinks Flexeril either didn't work or made him too sleepy in the past. Oxycodone worked but that was previously ordered for his hip which is no longer painful. Outpatient Tramadol has also worked for him in the past. Has not tried gabapentin. Also gets pain in the backs of this thighs when the Baudilio lift strap is there. Getting up to chair about every other day for a few hours. Aware of procedure plan for tomorrow.     Last 24 hour care team notes reviewed.     Physical Exam:    Blood pressure 124/62, pulse 75, temperature (!) 96.4  F (35.8  C), temperature source Oral, resp. rate 18, weight 117 kg (257 lb 14.4 oz), SpO2 96 %.    GENERAL: Alert and oriented x 3. Lying in bed, appears comfortable. Conversant.   HEENT: Anicteric sclera. Mucous membranes moist.   CV: RRR. S1, S2. No murmurs appreciated.   RESPIRATORY: Effort normal on room air. Lungs CTAB with no wheezing, rales, rhonchi.   NEUROLOGICAL: No focal deficits. Moves all extremities.    EXTREMITIES: No peripheral edema.   SKIN: No jaundice. No rashes.     Lines/Tubes/Drains:  none    Medical Decision Makin MINUTES SPENT BY ME on the date of service doing chart review, history, exam, documentation & further activities per the note.

## 2023-04-06 NOTE — PLAN OF CARE
Goal Outcome Evaluation:  No acute issues overnight. Sleeping well and has no c/o's. Continent using urinal indep.- staff empties. LBM yesterday. NWB LLE / liko lift transfers. (L) hip arthrocentesis scheduled for Fri.04/07.        Patient's most recent vital signs are:     Vital signs:  BP: 145/68  Temp: 97  HR: 81  RR: 18  SpO2: 95 %     Patient does not have new respiratory symptoms.  Patient does not have new sore throat.  Patient does not have a fever greater than 99.5.

## 2023-04-06 NOTE — PLAN OF CARE
Goal Outcome Evaluation:    Patient is alert and oriented x 4. He is able to make his needs known. Pt denied SOB and chest pain. He is able to swallow without difficulty. Pt is on blood glucose check with a level of 130. Pt refused scheduled Miconazole powder, Voltaren cream and Lidocaine patch.     Patient's most recent vital signs are:     Vital signs:  BP: 145/68  Temp: 97  HR: 81  RR: 18  SpO2: 95 %     Patient does not have new respiratory symptoms.  Patient does not have new sore throat.  Patient does not have a fever greater than 99.5.

## 2023-04-07 NOTE — ANESTHESIA CARE TRANSFER NOTE
Patient: Waldo Bustos    Procedure: Procedure(s):  BIOPSY, SYNOVIUM, LEFT  HIP, percutaneous  ARTHROCENTESIS, LEFT HIP       Diagnosis: Infection of prosthetic joint, sequela [T84.50XS]  Diagnosis Additional Information: No value filed.    Anesthesia Type:   General     Note:    Oropharynx: oropharynx clear of all foreign objects  Level of Consciousness: drowsy  Oxygen Supplementation: nasal cannula  Level of Supplemental Oxygen (L/min / FiO2): 6  Independent Airway: airway patency satisfactory and stable  Dentition: dentition unchanged  Vital Signs Stable: post-procedure vital signs reviewed and stable  Report to RN Given: handoff report given  Patient transferred to: PACU    Handoff Report: Identifed the Patient, Identified the Reponsible Provider, Reviewed the pertinent medical history, Discussed the surgical course, Reviewed Intra-OP anesthesia mangement and issues during anesthesia, Set expectations for post-procedure period and Allowed opportunity for questions and acknowledgement of understanding      Vitals:  Vitals Value Taken Time   /68 04/07/23 1214   Temp     Pulse 71 04/07/23 1221   Resp 9 04/07/23 1221   SpO2 99 % 04/07/23 1221   Vitals shown include unvalidated device data.    Electronically Signed By: Saud Gray MD  April 7, 2023  12:22 PM

## 2023-04-07 NOTE — OR NURSING
"PACU to Inpatient Nursing Handoff    Patient Waldo Bustos is a 72 year old male who speaks English.   Procedure Procedure(s):  BIOPSY, SYNOVIUM, LEFT  HIP, percutaneous  ARTHROCENTESIS, LEFT HIP   Surgeon(s) Primary: Rom Meyers MD  Resident - Assisting: Bartolome Verdugo MD  Fellow - Assisting: Angelito Garcia MD     Allergies   Allergen Reactions     Sulfa Drugs      Other reaction(s): *Unknown - Childhood Rxn     Tizanidine Other (See Comments)     Frequent urination; causes drowsiness, and dry mouth         Isolation  [unfilled]     Past Medical History   has a past medical history of Arthritis (5 years ago), Chronic osteoarthritis (Not sure), Diabetes (H) (10-12 years ago), DVT (deep venous thrombosis) (H), Dyslipidemia, Gastroesophageal reflux disease, History of blood transfusion, Iliopsoas abscess (H), Infection of prosthetic hip joint (H), JAIR (obstructive sleep apnea), Pulmonary embolism (H), RA (rheumatoid arthritis) (H) (Not sure), Reduced vision (2-3 years ago), and Venous insufficiency.    Anesthesia Choice   Dermatome Level     Preop Meds Tramadol  - time given: 0853   Nerve block Not applicable   Intraop Meds fentanyl (Sublimaze): 75 mcg total  ondansetron (Zofran): last given at 1152   Local Meds No   Antibiotics cefazolin (Ancef) - last given at 1151     Pain Patient Currently in Pain: no (\"Numb in the hip, no pain\". states patient.)   PACU meds  Not applicable   PCA / epidural No   Capnography     Telemetry ECG Rhythm: Normal sinus rhythm   Inpatient Telemetry Monitor Ordered? No        Labs Glucose Lab Results   Component Value Date    GLC 89 04/07/2023     02/06/2023       Hgb Lab Results   Component Value Date    HGB 11.0 03/30/2023       INR No results found for: INR   PACU Imaging Not applicable     Wound/Incision Incision/Surgical Site 11/22/22 Left;Anterior Hip (Active)   Incision Assessment UTV 04/07/23 1259   Daria-Incision Assessment Warm 02/26/23 1100   Closure DEEP " 04/07/23 1259   Incision Drainage Amount None 04/07/23 1259   Dressing Intervention Clean, dry, intact 04/07/23 1259   Number of days: 136      CMS        Equipment Not applicable   Other LDA       IV Access Peripheral IV 04/07/23 Right;Posterior Lower forearm (Active)   Number of days: 0      Blood Products Not applicable EBL < 10 mL   Intake/Output Date 04/07/23 0700 - 04/08/23 0659   Shift 5407-4678 0356-2316 3798-2168 24 Hour Total   INTAKE   I.V. 400   400   Shift Total 400   400   OUTPUT   Shift Total       Weight (kg)          Drains / Tran     Time of void PreOp Time of Void Prior to Procedure: 0630 (04/07/23 1002)    PostOp      Diapered? No   Bladder Scan     PO    water and Ginger ale.      Vitals    B/P: 121/57  T: 97.7  F (36.5  C)    Temp src: Axillary  P:  Pulse: 68 (04/07/23 1300)          R: 11  O2:  SpO2: 95 %    O2 Device: None (Room air) (04/07/23 1238)    Oxygen Delivery: 4 LPM (04/07/23 1214)         Family/support present N/A.    Patient belongings     Patient transported on cart   DC meds/scripts (obs/outpt) Not applicable   Inpatient Pain Meds Released? No       Special needs/considerations None   Tasks needing completion None       Ida Alejo RN  ASCOM [id number]

## 2023-04-07 NOTE — ANESTHESIA PROCEDURE NOTES
Airway       Patient location during procedure: OR  Staff -        Anesthesiologist:  Tresa Martino MD       Resident/Fellow: Saud Gray MD       Performed By: resident  Consent for Airway        Urgency: elective  Indications and Patient Condition       Indications for airway management: caridad-procedural       Induction type:intravenous       Mask difficulty assessment: 0 - not attempted    Final Airway Details       Final airway type: supraglottic airway    Supraglottic Airway Details        Type: LMA       Brand: Air-Q       LMA size: 4.5    Post intubation assessment        Number of attempts at approach: 1       Secured with: pink tape       Ease of procedure: easy       Dentition: Intact and Unchanged

## 2023-04-07 NOTE — BRIEF OP NOTE
[facility]    Brief Operative Note    Pre-operative diagnosis: Infection of prosthetic joint, sequela [T84.50XS]  Post-operative diagnosis Same as pre-operative diagnosis    Procedure: Procedure(s):  BIOPSY, SYNOVIUM, LEFT  HIP, percutaneous  ARTHROCENTESIS, LEFT HIP  Surgeon: Surgeon(s) and Role:     * Rom Meyers MD - Primary     * Bartolome Verdugo MD - Resident - Assisting     * Angelito Garcia MD - Fellow - Assisting  Anesthesia: Choice   Estimated Blood Loss: Less than 10 ml    Drains: None  Specimens:   ID Type Source Tests Collected by Time Destination   A :  misc order: send red top tube for non LFA, conventional synovasure test to be done at [facility], not the LFA test at [place].   Synovial fluid Synovium ANAEROBIC BACTERIAL CULTURE ROUTINE, CELL COUNT WITH DIFFERENTIAL FLUID, AEROBIC BACTERIAL CULTURE ROUTINE, LABORATORY MISCELLANEOUS ORDER Rom Meyers MD 4/7/2023 11:32 AM    B :  Tissue Synovium ANAEROBIC BACTERIAL CULTURE ROUTINE, AEROBIC BACTERIAL CULTURE ROUTINE Rom Meyers MD 4/7/2023 11:41 AM    C :  Tissue Synovium ANAEROBIC BACTERIAL CULTURE ROUTINE, AEROBIC BACTERIAL CULTURE ROUTINE Rom Meyers MD 4/7/2023 11:45 AM    D :  Tissue Synovium ANAEROBIC BACTERIAL CULTURE ROUTINE, AEROBIC BACTERIAL CULTURE ROUTINE Rom Meyers MD 4/7/2023 11:46 AM    E :  Tissue Synovium ANAEROBIC BACTERIAL CULTURE ROUTINE, AEROBIC BACTERIAL CULTURE ROUTINE Rom Meyers MD 4/7/2023 11:48 AM    F :  Tissue Synovium ANAEROBIC BACTERIAL CULTURE ROUTINE, AEROBIC BACTERIAL CULTURE ROUTINE Rom Meyers MD 4/7/2023 11:51 AM      Findings:   None.  Complications: None.  Implants: * No implants in log *    Waldo Bustos is a 72-year-old male now status post left hip arthrocentesis, synovial biopsy with Dr. Rahman on 4/7/2023.  Discharging to his TCU today.  Okay for regular diet.  Resume preoperative weightbearing restrictions.  No additional  imaging is needed.  No additional antibiotics necessary.  Resume previous DVT prophylaxis regimen.  Orthopedics to monitor culture results, definitive OR plan pending culture results.  Nonweightbearing left hip, please refer to previous transitional care facility progress note for complete plan.    Bartolome Verdugo MD

## 2023-04-07 NOTE — OP NOTE
[facility] Orthopaedic Surgery  Operative Note            Procedure date 4/7/2023   Pre-operative diagnosis: Infection of prosthetic joint, sequela [T84.50XS]   Post-operative diagnosis  same as preoperative   Procedure: Procedure(s):  BIOPSY, SYNOVIUM, LEFT  HIP, percutaneous  ARTHROCENTESIS, LEFT HIP   Surgeon: Rom Meyers MD   Assistants(s): Gilson Verdugo MD   Anesthesia: Choice    Estimated blood loss: Less than 10 ml   Total IV fluids: (See anesthesia record)   Blood transfusion: (See anesthesia record)   Total urine output: (See anesthesia record)   Drains: None     Specimens:  ID Type Source Tests Collected by Time Destination   A :  misc order: send red top tube for non LFA, conventional synovasure test to be done at  [place], not the LFA test at [place].   Synovial fluid Synovium ANAEROBIC BACTERIAL CULTURE ROUTINE, CELL COUNT WITH DIFFERENTIAL FLUID, AEROBIC BACTERIAL CULTURE ROUTINE, LABORATORY MISCELLANEOUS ORDER Rom Meyers MD 4/7/2023 11:32 AM    B :  Tissue Hip, Left ANAEROBIC BACTERIAL CULTURE ROUTINE, AEROBIC BACTERIAL CULTURE ROUTINE Rom Meyers MD 4/7/2023 11:41 AM    C :  Tissue Hip, Left ANAEROBIC BACTERIAL CULTURE ROUTINE, AEROBIC BACTERIAL CULTURE ROUTINE Rom Meyers MD 4/7/2023 11:45 AM    D :  Tissue Hip, Left ANAEROBIC BACTERIAL CULTURE ROUTINE, AEROBIC BACTERIAL CULTURE ROUTINE Rom Meyers MD 4/7/2023 11:46 AM    E :  Tissue Hip, Left ANAEROBIC BACTERIAL CULTURE ROUTINE, AEROBIC BACTERIAL CULTURE ROUTINE Rom Meyers MD 4/7/2023 11:48 AM    F :  Tissue Hip, Left ANAEROBIC BACTERIAL CULTURE ROUTINE, AEROBIC BACTERIAL CULTURE ROUTINE Rom Meyers MD 4/7/2023 11:51 AM        Implants:  * No implants in log *    Findings:   No purulence present.     Indications:  Patient undergoing synovial biopsy has previous attempted aspiration to evaluate for underlying infection was unsuccessful and did not yield  adequate specimen for evaluation    Procedure details:  Patient placed on the operative table supine position.  Sterile prep and drape completed of the left lower quadrant and groin region.  Using fluoroscopic guidance, aspiration was undertaken of the left Girdlestone arthroplasty region.  10 cc of bloody material was obtained.  This was sent for bacterial cultures as well as cell count and none lateral flow synovasure testing.    At this point, an incision was made in the groin area and a dilator Plast down through the left thigh musculature until the region of the Girdlestone arthroplasty with the cement spacer was accessed.  Using a biopsy sheath to prevent any contact with the skin, a pituitary rongeur was then passed into the area of the Girdlestone fibrous joint and multiple tissue samples were obtained.  A total of 5 different collection of samples were obtained for aerobic and anaerobic cultures.  Fluoroscopic guidance was utilized.    Upon completion of the procedure, hemostasis was secured.  4-0 chromic sutures used to reapposed the biopsy skin edges.  Sterile dressing applied.    Postoperative plan:  Patient return to transitional care unit until remainder of the infection work-up is completed prior to decision about second stage reconstructive revision total hip arthroplasty.      MD Vanessa Stephenson Family Professor  Oncology and Adult Reconstructive Surgery  Dept Orthopaedic Surgery, [facility]  [phone] office, [phone] pager  [email or web address]

## 2023-04-07 NOTE — PLAN OF CARE
Goal Outcome Evaluation:    Patient is alert and oriented x 4. He is able to make his needs known. Pt denied SOB and chest pain. He is able to swallow without difficulty. Pt is on blood glucose check with a level of 95. Left Incision site is clean, dry and open to air. Pt has biopsy appointment tomorrow. Pt is aware and wants morning nurse to remind him in the morning. Pt refused scheduled Miconazole powder, Voltaren cream and Lidocaine patch.     Patient's most recent vital signs are:     Vital signs:  BP: 115/47  Temp: 96.3  HR: 74  RR: 18  SpO2: 95 %     Patient does not have new respiratory symptoms.  Patient does not have new sore throat.  Patient does not have a fever greater than 99.5.                              Fall Risk

## 2023-04-07 NOTE — ANESTHESIA POSTPROCEDURE EVALUATION
Patient: Waldo Bustos    Procedure: Procedure(s):  BIOPSY, SYNOVIUM, LEFT  HIP, percutaneous  ARTHROCENTESIS, LEFT HIP       Anesthesia Type:  General    Note:  Disposition: Outpatient   Postop Pain Control: Uneventful            Sign Out: Well controlled pain   PONV: No   Neuro/Psych: Uneventful            Sign Out: Acceptable/Baseline neuro status   Airway/Respiratory: Uneventful            Sign Out: Acceptable/Baseline resp. status   CV/Hemodynamics: Uneventful            Sign Out: Acceptable CV status; No obvious hypovolemia; No obvious fluid overload   Other NRE: NONE   DID A NON-ROUTINE EVENT OCCUR? No           Last vitals:  Vitals Value Taken Time   /62 04/07/23 1245   Temp 36.5  C (97.7  F) 04/07/23 1214   Pulse 69 04/07/23 1254   Resp 7 04/07/23 1254   SpO2 97 % 04/07/23 1254   Vitals shown include unvalidated device data.    Electronically Signed By: Tresa Martino MD  April 7, 2023  12:55 PM

## 2023-04-07 NOTE — CARE CONFERENCE
RN: Ida RN called from PACU with the following report, Rahat is on his way back to TCU. Primapore on site, no bleeding noted to site, 5 tissue sites and synovial fluid sent for testing.  EBL <10. No medications given, no pain reported from pt.

## 2023-04-07 NOTE — PLAN OF CARE
Goal Outcome Evaluation:  No acute issues overnight. Appears sleeping well and has no c/o's as of yet. Continent using urinal indep.- staff emptied. Remains NWB LLE / liko lift transfers. Scheduled for Arthrocentesis / Biopsy (L) hip this AM.        Patient's most recent vital signs are:     Vital signs:  BP: 115/47  Temp: 96.3  HR: 74  RR: 18  SpO2: 95 %     Patient does not have new respiratory symptoms.  Patient does not have new sore throat.  Patient does not have a fever greater than 99.5.

## 2023-04-07 NOTE — ANESTHESIA PREPROCEDURE EVALUATION
Anesthesia Pre-Procedure Evaluation    Patient: Waldo Bustos   MRN: [id number] : 1950        Procedure : Procedure(s):  BIOPSY, SYNOVIUM, LEFT  HIP, percutaneous  ARTHROCENTESIS, LEFT HIP          Past Medical History:   Diagnosis Date     Arthritis 5 years ago     Chronic osteoarthritis Not sure     Diabetes (H) 10-12 years ago     DVT (deep venous thrombosis) (H)      Dyslipidemia      Gastroesophageal reflux disease      History of blood transfusion      Iliopsoas abscess (H)      Infection of prosthetic hip joint (H)      JAIR (obstructive sleep apnea)      Pulmonary embolism (H)      RA (rheumatoid arthritis) (H) Not sure     Reduced vision 2-3 years ago    Cataract Surgery on both eyes     Venous insufficiency       Past Surgical History:   Procedure Laterality Date     Cataract       COLONOSCOPY       EGD       IR FINE NEEDLE ASPIRATION W ULTRASOUND  3/6/2023     IR IVC FILTER PLACEMENT      removed     IRRIGATION AND DEBRIDEMENT HIP, PLACE ANTIBIOTIC CEMENT BEADS / SPACER Left 2022    Procedure: and Reconstruction Using G 20 Prosthetic Antibiotic Cement Spacer;  Surgeon: Rom Meyers MD;  Location: [facility] OR     REMOVE HARDWARE ARTHROPLASTY HIP Left 2022    Procedure: Explantation of Chronic  Infected Left Total Hip Arthroplasty, Extended Trochanteric Osteotomy with Extensive Debridement;  Surgeon: Rom Meyers MD;  Location: [facility] OR     [facility] PELVIS/HIP JOINT SURGERY UNLISTED       [facility] STOMACH SURGERY PROCEDURE UNLISTED      2 Hernia surgeries as a child      Allergies   Allergen Reactions     Sulfa Drugs      Other reaction(s): *Unknown - Childhood Rxn     Tizanidine Other (See Comments)     Frequent urination; causes drowsiness, and dry mouth        Social History     Tobacco Use     Smoking status: Never     Smokeless tobacco: Never   Vaping Use     Vaping status: Not on file   Substance Use Topics     Alcohol use: No      Wt Readings from Last 1 Encounters:   23 117 kg  (257 lb 14.4 oz)        Anesthesia Evaluation   Pt has had prior anesthetic.         ROS/MED HX  ENT/Pulmonary:     (+) sleep apnea, uses CPAP,  (-) recent URI   Neurologic:  - neg neurologic ROS     Cardiovascular:     (+) Dyslipidemia -----Taking blood thinners Previous cardiac testing   Echo: Date: 4/2022 Results:  Normal EF no significant valvular disease  Stress Test: Date: Results:    ECG Reviewed: Date: Results:    Cath: Date: Results:      METS/Exercise Tolerance: 4 - Raking leaves, gardening    Hematologic: Comments: - PE/DVT on chronic apixaban    (+) History of blood clots, pt is anticoagulated, anemia,     Musculoskeletal:   (+) arthritis,     GI/Hepatic:     (+) GERD, Asymptomatic on medication,     Renal/Genitourinary:       Endo:     (+) type II DM (victoza and metformin), Last HgA1c: 5.8, date: 03/06/2023, Not using insulin, - not using insulin pump. Normal glucose range: 100-150, not previously admitted for DM/DKA. Obesity,     Psychiatric/Substance Use:  - neg psychiatric ROS     Infectious Disease: Comment: Chronically infected L. Hip hardware.      Malignancy:       Other:               OUTSIDE LABS:  CBC:   Lab Results   Component Value Date    WBC 8.5 03/30/2023    WBC 10.6 03/06/2023    HGB 11.0 (L) 03/30/2023    HGB 11.4 (L) 03/06/2023    HCT 34.8 (L) 03/30/2023    HCT 34.3 (L) 03/06/2023     03/30/2023     03/06/2023     BMP:   Lab Results   Component Value Date     03/30/2023     03/06/2023    POTASSIUM 4.5 03/30/2023    POTASSIUM 4.2 03/06/2023    CHLORIDE 104 03/30/2023    CHLORIDE 104 03/06/2023    CO2 28 03/30/2023    CO2 25 03/06/2023    BUN 37.8 (H) 03/30/2023    BUN 42.1 (H) 03/06/2023    CR 0.99 03/30/2023    CR 1.00 03/06/2023    GLC 97 04/07/2023    GLC 95 04/06/2023     COAGS: No results found for: PTT, INR, FIBR  POC: No results found for: BGM, HCG, HCGS  HEPATIC:   Lab Results   Component Value Date    ALBUMIN 3.7 03/30/2023    PROTTOTAL 6.8 03/30/2023     ALT 13 03/30/2023    AST 12 03/30/2023    ALKPHOS 80 03/30/2023    BILITOTAL 0.2 03/30/2023     OTHER:   Lab Results   Component Value Date    LACT 1.3 03/20/2023    A1C 5.8 (H) 03/06/2023    MAIKOL 9.5 03/30/2023    CRP 3.6 02/06/2023    SED 77 (H) 01/09/2023       Anesthesia Plan    ASA Status:  3      Anesthesia Type: General.     - Airway: LMA   Induction: Intravenous.   Maintenance: Inhalation.        Consents            Postoperative Care    Pain management: IV analgesics, Multi-modal analgesia.   PONV prophylaxis: Ondansetron (or other 5HT-3), Background Propofol Infusion     Comments:    Other Comments: 72yr old with chronically infected L. Hip hardware. Patient had an attempted left hip aspiration by IR on 3/6 but no fluid could be obtained. Plan for biopsy to guide decision making about candidacy for proceeding w/ reimplantation of hip.            Saud Gray MD

## 2023-04-07 NOTE — PLAN OF CARE
Patient is alert and oriented x2. Able to make needs known to staff. Patient is continent of both bowel and bladder. Urinal within reach at bedside and bedpan provided upon request. Returned from biopsy procedure on AM shift. Vitals are WNL. No changes in cognition or effects of anesthesia noted. No blood or discharge noted from site. Patient denies pain at incision site. Call-light within reach. Will continue to monitor.     Patient's most recent vital signs are:     Vital signs:  BP: 124/45  Temp: 95.6  HR: 70  RR: 16  SpO2: 97 %     Patient does not have new respiratory symptoms.  Patient does not have new sore throat.  Patient does not have a fever greater than 99.5.

## 2023-04-07 NOTE — OR NURSING
Patient refused to turn for skin check and sacral Mepilex placement. Passed on in report to nahomy Alston RN, TCU.

## 2023-04-07 NOTE — PLAN OF CARE
The patient is alert and oriented x 3. VSS. Able to make needs known. Medicated with Tramadol x 1 for neck and shoulder pain around 9 am. Patient is scheduled for same day surgery (biopsy) followed by a decision on if to proceed with the reimplantation of the left hip. Chlorhexidine wipes done. Report given to OR MARIBEL Collado and patient has been transported to OR by in house transport.     Patient's most recent vital signs are:     Vital signs:  BP: 124/59  Temp: 96.7  HR: 71  RR: 18  SpO2: 95 %     Patient does not have new respiratory symptoms.  Patient does not have new sore throat.  Patient does not have a fever greater than 99.5.

## 2023-04-08 NOTE — PROGRESS NOTES
"  [facility]  Transitional Care Unit Progress Note  Waldo Bustos  [id number]  April 9, 2023         Assessment & Plan:   Waldo \"Don\" Akash is a 72 year old man with a history of morbid obesity, DM II, HLD, JAIR, PE/DVT on chronic apixaban, venous insufficiency, osteoarthritis, and chronic left hip prosthetic joint infection who was admitted to [facility] on 11/22/22 for scheduled explantation of left hip prosthesis, debridement, and reconstruction with antibiotic cement spacer. Hospital stay was c/b acute encephalopathy (attributed to anesthesia and sedating meds), acute hypoxic respiratory failure (attributed to obesity hypoventilation syndrome), acute blood loss anemia, HALEY (thought pre renal), and profound physical deconditioning. Transferred to TCU for ongoing rehabilitation. He is now awaiting community placement after not making progress here.      # Physical Debility. PT/OT no longer working with patient after several months of in-bed therapy and refusals to mobilize. SW is following for community placement but this has been quite challenging.     # Chronic Left Hip PJI s/p explantation, debridement, and reconstruction with antibiotic spacer (11/22/22). Dr. Meyers. Surgical cultures grew Finegoldia magna and Proteus mirabilis (also had C. acnes in the past but doing well despite no directed treatment of this). S/p 6 wk course of PO Cipro and Flagyl per ID ended 1/3/23 - noted potential higher risk for treatment failure given comorbidities, sinus tract, and history of PJI. Saw Dr. Meyers 1/9/23 and completed 8 wk antibiotic free holiday after which time left hip aspiration was attempted by IR on 3/6 but no fluid could be obtained. Saw Dr. Meyers 3/20 and underwent biopsy under anesthesia 4/7 to guide decision making about candidacy for proceeding w/ reimplantation of hip. So far, all cultures NGTD.  - Orthopedic surgery following peripherally culture results  - NWB LLE.   - " Encouraging up to chair daily, currently doing this approximately every other day via Baudilio lift.   - Not requiring pain meds for hip.   - Follow up in Ortho clinic with Dr. Meyers as scheduled on 4/24.      # Acute on Chronic Neck Pain.   # Intermittent Numbness and Tingling in Right Fingers.   Longstanding neck pain previously followed OP w/o recent imaging on file. Earlier this week exacerbated muscle tightness/spasm from lying in bed. Also reported right 2nd-4th finger intermittent numbness and tingling - suspicion for compressive neuropathy d/t positioning off left hip in bed and/or cervical radiculopathy. He was not interested in workup beyond prior x-ray but was interested in better pain control so started on prn Ultram with improvement.   - Continue current Tylenol and Robaxin. Discussed rotating to another muscle relaxant but it sounds like he may not have tolerated Flexeril in the past.   - Continue PRN Tramadol as this worked well for patient in the past. Prefer not to restart prior oxycodone as this was for acute post op hip pain and patient's neck pain is chronic.   - Topicals not helpful.   - Advised heating pad followed by gentle stretching when he is up in the chair.   - Discussed potential gabapentin trial pending course.      # DM II. HgbA1C has improved from 6.8 to 5.8% while here. BG controlled.   Recent Labs   Lab 04/09/23  0909 04/08/23  2153 04/08/23  1241 04/08/23  0726 04/07/23  2117 04/07/23  1233   * 132* 100* 108* 204* 89      - Continue home metformin.   - Continue Victoza being used here in place of PTA Trulicity.   - Continue home Actos but low threshold to stop this if any issues with hypoglycemia.   - No plan to resume home glipizide (not needed and has risk for hypoglycemia).      # JAIR. Continue home CPAP.     # Hx DVT/PE. Nothing recent. Continue apixaban.        CODE: Full  DVT: Apixaban   Diet: Regular   Indications for Psychotropic Medications: N/A    Gilson Stanton  DIANA  Internal Medicine Hospitalist   [facility]  [phone]          Consults:   None         Discharge Planning:   Not working with therapies due to lack of progress, awaiting [facility] placement - placement has been very challenging and patient remains hopeful he will be cleared to proceed w/ reimplantation of hip        Interval History:   No acute events overnight. Pain stable. Denies other acute physical concerns at this time.          Physical Exam:   Vitals were reviewed  Blood pressure 117/57, pulse 77, temperature 98  F (36.7  C), temperature source Oral, resp. rate 18, SpO2 96 %.  GEN: In NAD  HEENT: NCAT; PERRL; sclerae non-icteric  LUNGS: CTAB  CV: RRR  ABD: +BSs; SNTND  EXT: No BLE edema  SKIN: No acute rashes noted on exposed areas.  NEURO: AAOx3; CNs grossly intact; No acute focal deficits noted.        ROUTINE LABS:  CMP  Recent Labs   Lab 04/09/23  0909 04/08/23  2153 04/08/23  1241 04/08/23  0726   * 132* 100* 108*     CBCNo lab results found in last 7 days.  INRNo lab results found in last 7 days.  Arterial Blood GasNo lab results found in last 7 days.      Gilson Stanton PA-C  Internal Medicine [facility]ist   [facility]  [phone]

## 2023-04-08 NOTE — CARE PLAN
"RN: Pt complain of left hip biopsy site pain 10/10. Refusing complete skin check except will allow left hip  Area assessment.  Refusing to roll side to side, refusing ice pack or warm pack. Refusing voltaren \"that never helps\". Ultram given 0837 pain 10/10 with pain to 9/10 one hour after, robaxin given 0848, at 1045 states pain level 7/10 and no further action needed. Will continue to monitor left biopsy site hip pain.  Pt states pain went down to 6/10 and back up to 7-8/10 Tylenol prn ES given at 1330. Urinating in urinal and staff empties.     Patient's most recent vital signs are:     Vital signs:  BP: 117/57  Temp: 98  HR: 77  RR: 18  SpO2: 96 %     Patient does not have new respiratory symptoms.  Patient does not have new sore throat.  Patient does not have a fever greater than 99.5.         "

## 2023-04-08 NOTE — PLAN OF CARE
[id number]  Patient is alert and oriented x4. Able to make needs known to staff. Patient is continent of both bowel and bladder. Urinal within reach and bed pan provided upon request. Liko lift for transfers. Refused full skin assessment. No PRNs for pain. Stated his pain is manageable.    [id number]  No changes in condition during overnight shift. Call-light within reach.    Patient's most recent vital signs are:     Vital signs:  BP: 128/56  Temp: 97.8  HR: 78  RR: 18  SpO2: 94 %     Patient does not have new respiratory symptoms.  Patient does not have new sore throat.  Patient does not have a fever greater than 99.5.

## 2023-04-08 NOTE — PLAN OF CARE
Goal Outcome Evaluation:    Pt is A&OX4, calm, & cooperative with care. Denied CP, SOB, & n/v. NWB to the LLE. A of 2 with Liko lift; was not OOB this shift. Continent for both B&B; uses bedpan for BM. Urinal at the bedside for bladder. Takes med whole with thin liquid. Dressing to the L hip biopsy site CDI. No acute issues overnight. Pt slept well for most of the night. Able to make needs known & call light within reach. Will continue with POC.    Patient's most recent vital signs are:     Vital signs:  BP: 124/45  Temp: 95.6  HR: 70  RR: 16  SpO2: 97 %     Patient does not have new respiratory symptoms.  Patient does not have new sore throat.  Patient does not have a fever greater than 99.5.

## 2023-04-09 NOTE — CARE PLAN
RN: using prn tylenol for neck pain. 8/10. States left hip only hurts when he moves. Refuses turn and reposition and skin check. Tylenol effective pain control.     Patient's most recent vital signs are:     Vital signs:  BP: 118/58  Temp: 96.9  HR: 74  RR: 16  SpO2: 94 %     Patient does not have new respiratory symptoms.  Patient does not have new sore throat.  Patient does not have a fever greater than 99.5.

## 2023-04-10 NOTE — PLAN OF CARE
Goal Outcome Evaluation:       Overall, Pt had no acute issue this shift. Slept through out the night. Alert and oriented x 4. Able to make needs known to staffs. Pt appears to be continent of both bowel and bladder. Urinal kept at beside this shift. No acute issue noted this shift. Will continue with POC.       Patient's most recent vital signs are:     Vital signs:  BP: 128/61  Temp: 96.9  HR: 76  RR: 16  SpO2: 95 %     Patient does not have new respiratory symptoms.  Patient does not have new sore throat.  Patient does not have a fever greater than 99.5.

## 2023-04-10 NOTE — PROGRESS NOTES
"Pt went for a biopsy on 23.  Pt was NOT discharge.  Pt was only out of TCU for few hours. SW cut and paste last CMA.  SEE BELOW.   No new CMA or BIMS or PHQ was needed.      Danielle Moreira, [facility], Transitional Care Unit   Social Work   [facility] [address], [address] [place] [address]  (ph) [phone]        Ghada Morales, [facility]    Social Work  Progress Notes  Signed  Date of Service:  2022  9:57 AM  Creation Time:  2022  9:57 AM                                                                                                                       Social Work: Initial Assessment with Discharge Plan     Patient Name: Waldo Bustos  : 1950  Age: 72 year old  MRN: [id number]  Completed assessment with: chart review and patient interview  Admitted to TCU: 22     Presenting Information   Date of SW assessment: 2022  Health Care Directive: Provided Copy and Provided education  Primary Health Care Agent: patient  Secondary Health Care Agent: Adoptive brother Gerry \"Lalo\" Akash is NOK, per patient  Living Situation: resides with significant other, Claire Das in a multi-level home  Previous Functional Status: independently ambulated with cane PTA, also drove and tended to ADL's independently  DME available: cane  Patient and family understanding of hospitalization: appropriate and pleasant  Cultural/Language/Spiritual Considerations: pt is a 71 yo male who is in a relationship with significant other   Abuse concerns: none  -------------------------------------------------------------------------------------------------------------  TRANSPORTATION:    Has lack of transportation kept you from medical appointments, meetings, work, or from getting things needed for daily living?  A. Yes, it has kept me from medical appointments or from getting my medications  B. Yes, it has kept me from non-medical meetings, appointments, work or from getting " "things that I need  C. No  X. Patient Unable to respond  Y. Patient declines to respond  -------------------------------------------------------------------------------------------------------------  Health Literacy:   How Often do you need to have someone help you when you read instructions, pamphlets, or other written material from your doctor or pharmacy?  0.       Never  1.       Rarely  2.       Sometimes  3.       Often  4.       Always  5.       Patient declines to respond  6.       Patient unable to respond  ------------------------------------------------------------------------------------------------------------   BIMS:  See flow sheet   Tell the pt : \"I am going to say three words for you to remember.  Please repeat the words after I have said all three.  The words are:Sock, Blue and Bed. Now tell me the three words.\"      Number of words repeated after the first attempt.  0: None   1: One  2: Two  3: Three  Ask the pt: \"Please tell me what year it is right now?\"  0: Missed by 5 >5 years or no answer   1: Missed by 2-5 years  2: Missed by a 1 year  3: Correct       Ask the pt: \"What month are we in right now?\"  0: Missed by > 1 month or no answer.  1: Missed by 6 days to 1 month  2: Accurate within 5 days.      Ask pt: \"what day of the week is today?\"  0: Incorrect day of the week.  1: Correct.      Ask pt:\" Let's go back to an earlier question.  What were those three words that I asked you to repeat?\" If unable to remember a word, give a cue (something to wear, a color, a piece of furniture) for that word.   Able to recall Sock.  0: No, could not recall.  1: Yes, after cueing (something to wear)  2: Yes, no cueing required.   Able to recall Blue.  0: No, could not recall.  1:Yes, after cueing (a color)  2:Yes, no cueing required.   Able to recall Bed.  0: No,  1: Yes, after cueing (a piece of furniture)  2: Yes, no cueing required. "   -----------------------------------------------------------------------------------------------------------  CAM:  1.) Acute Onset/Fluctuating Course:  Is there evidence of an acute change in mental status from the patient's baseline?  0. No  1. Yes  2.) Inattention:  Does the patient have difficulty focusing attention, for example, being easily distractable, or having difficulty keeping track of what was being said?  0. No - Behavior not present  1. Yes - Behavior present  3.) Disorganized Thinking:  Is the patient's speech disorganized or incoherent, such as rambling or irrelevant conversation, unclear or illogical flow of ideas, or unpredictable switching from subject to subject?  0. No - Behavior not present  1. Yes - Behavior present  4.) Altered level of consciousness:  Did the patient have altered level of consciousness as indicated by any of the following criteria?  0. No - Alert  1. Yes - Vigilant (hyperalert), lethargic (drowsy) , stupor (difficult to arouse) or comatose (unable to be aroused)  -------------------------------------------------------------------------------------------------------------  PHQ-9:   Over the last 2 weeks, have you been bothered by any of the following problems?      If symptom is present, then ask the patient:  About how often have you been bothered by this?   Symptom Presence                                              Symptom Frequency  0. No                                                                     0. Never or 1 day  1. Yes                                                                   1. 2-6 days (several days)  9. No Response                                                    2. 7-11 days (half or more of the days)                                                                                3. 12-14 days (nearly every day)       Present Frequency   A.       Little interest of pleasure doing things  0     B.       Feeling down, depressed, or hopeless  0    "  C.       Trouble falling or staying asleep, or sleeping too much       D.       Feeling tired or having little energy       E.       Poor appetite or overeating       F.        Feeling bad about yourself - or that you are a failure, or have let yourself or your family down       G.      Trouble concentrating on things, such as reading the newspaper or watching television       H.      Moving or speaking so slowly that other people could have noticed. Or the opposite - being so fidgety or restless that you have been moving around a lot more than usual       I.         Thoughts that you would be better off dead, or of hurting yourself in some way          -------------------------------------------------------------------------------------------------------------  SOCIAL ISOLATION  How often do you feel lonely or isolated form those around you?  0.       Never  1.       Rarely  2.       Sometimes  3.       Often  4.       Always  5.       Patient declines to respond  6.       Patient unable to respond  -------------------------------------------------------------------------------------------------------------     BIMS: Pt scored 15 on BIMS indicating cognition intact  PHQ-9: Pt scored 00 on PHQ-9 indicating normative  PAS: confirmation number- 10004  Has there been a level II screen?  No  Were there any recommendations in the screen? N/A  If yes, will the recommendations we incorporated into the Plan of Care?  N/A  Physical Health  Reason for admission:   Per H&P,  \"Patient is a 73 y/o man who has multiple medical problems including diabetes mellitus type II, rheumatoid arthritis, obstructive sleep apnea and past DVT/PE. Patient had undergone left total hip arthroplasty for left hip osteoarthritis on 07-Mar-2018. 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 Patient apparently was treated with ceftriaxone and amoxicillin following " "this. Patient had a left hip aspiration in Feb-2021. 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 Patient had fluid aspiration and drain placement on 06-Apr-2022. Cultures grew Cutibacterium species. Patient last was hospitalized from 22-Nov-2022 to 24-Nov-2022. Patient underwent explantation of left longstem total hip arthroplasty, extended proximal femoral trochanteric osteotomy left femur and manufacture and placement of vancomycin tobramycin impregnated cement spacer implant on 22-Nov-2022 for left hip prosthetic joint infection. Cultures grew Proteus mirabilis and Finegoldia magna. During hospital stay, patient also had acute encephalopathy, acute blood loss anemia, acute hypoxemic respiratory failure and acute kidney injury. Patient received 4 units of pRBCs during hospital stay for acute blood loss anemia. Acute encephalopathy, acute hypoxemic respiratory failure and acute kidney injury had resolved by the time of discharge. Patient was discharged to TCU on 24-Nov-2022.\"        Provider Information   Primary Care Physician:Jv Felix   : none reported     Mental Health:   Diagnosis: none  Current Support/Services: none  Previous Services: none  Services Needed/Recommended: none     Substance Use:  Diagnosis: none  Current Support/Services: none  Previous Services: none  Services Needed/Recommended: none     Support System  Marital Status: single   Family support: significant other, Claire Das  Other support available: adoptive brotherGerry \"Lalo\" [facility] [phone]  Gaps in support system: none noted     Community Resources  Current in home services: none  Previous services: none     Financial/Employment/Education  Employment Status: retired  Income Source: penitentiary  Education: not assessed  Financial Concerns:  Insurance coverage and cost " of TCU stay  Insurance: [facility] MEDICARE ADVANTAGE     Discharge Plan   Patient and family discharge goal: return home when therapy goals are reached and patient can navigate house with stairs  Provided Education on discharge plan: YES  Patient agreeable to discharge plan:  YES  A list of Medicare Certified Facilities was provided to the patient and/or family to encourage patient choice. Based on location and rating, patient would like referrals made to: NA  General information regarding anticipated insurance coverage and possible out of pocket cost was discussed. Patient and patient's family are aware patient may incur the cost of transportation to the facility, pending insurance payment: YES  Barriers to discharge: medical stability and therapy goals reached     Discharge Recommendations   Disposition: to be determined  Transportation Needs: TBD  Name of Transportation Company and Phone: none     Additional comments   Swrk will remain available and writer provided patient with primary unit  contact information.     Ghada Morales [facility], Transitional Care Unit   Social Work

## 2023-04-10 NOTE — PLAN OF CARE
Patient is alert and oriented x4. Able to make needs known to staff.pt refused to eat lunch. Patient is continent of both bowel and bladder. Uses  Urinal at bed side.. Liko lift for transfers. Refused full skin assessment. denied pain or discomfort. Call light with in reach. Continue with POC.  Patient's most recent vital signs are:     Vital signs:  BP: 128/55  Temp: 96  HR: 72  RR: 16  SpO2: 94 %     Patient does not have new respiratory symptoms.  Patient does not have new sore throat.  Patient does not have a fever greater than 99.5.

## 2023-04-10 NOTE — PROGRESS NOTES
CLINICAL NUTRITION SERVICES - REASSESSMENT NOTE     Nutrition Prescription    RECOMMENDATIONS FOR MDs/PROVIDERS TO ORDER:  None    Malnutrition Status:    Moderate malnutrition in the context of chronic illness    Recommendations already ordered by Registered Dietitian (RD):  Encouraged continued adequate intakes    Future/Additional Recommendations:  Monitor labs, intakes, and weight trends.     EVALUATION OF THE PROGRESS TOWARD GOALS   Diet: Regular  Intake/[place]: 100% of documented meals. Poorly documented  Snacks/supplements: PRN    Pt ordering (on average) 1625 kcal and 59 g protein per day per [employer]. With documented intakes, pt is likely meeting 87% minimum energy and 62% protein needs.     NEW FINDINGS/REVIEW OF SYSTEMS     Nutrition/GI: Patient states he is eating okay, just has a couple menu items he will eat. Has been frustrated with his situation and the little things like not having a working phone and his pen and paper being taken from him by staff. Writer listened and provided emotional support and validated concerns. Communicated pt concerns with charge RN. Patient still with large stock of ensure in room which he no longer uses.     Weights: Pt previously down 60 lb (19%) in ~ <1 month prior to admission  Pt seems weight stable with some fluctuations during admission.   Wt Readings from Last Encounters:   03/23/23 117 kg (257 lb 14.4 oz)   03/20/23 118 kg (260 lb 1.6 oz)   03/06/23 117.7 kg (259 lb 8 oz)      01/30/23 119.5 kg (263 lb 8 oz)   01/26/23 118.1 kg (260 lb 4.8 oz)   01/12/23 119 kg (262 lb 6.4 oz)   01/09/23 119.7 kg (264 lb)   12/30/22 118.5 kg (261 lb 3.2 oz)      11/22/22 147.1 kg (324 lb 4.8 oz)   11/14/22 142.9 kg (315 lb)   11/09/22 146.8 kg (323 lb 9.6 oz)   05/19/22 136.1 kg (300 lb)   07/03/18 148.9 kg (328 lb 4.8 oz)     Labs reviewed   03/03/23 13:13 03/06/23 07:33 03/30/23 10:54   Urea Nitrogen 33.5 (H) 42.1 (H) 37.8 (H)   Glucose 104 (H) 114 (H) 98   Hemoglobin A1C   5.8 (H)       Medications reviewed: liraglutide, metformin, pioglitazone    MALNUTRITION  % Intake: Decreased intake does not meet criteria   % Weight Loss: Previous significant weight loss however now weight stable over last 2 months.   Subcutaneous Fat Loss: Lower arm:  Mild  Muscle Loss: Temporal:  Mild, Upper arm (bicep, tricep):  Mild-moderate, Lower arm  (forearm):  Mild and Posterior calf:  Moderate  Fluid Accumulation/Edema: Does not meet criteria  Malnutrition Diagnosis: Moderate malnutrition in the context of chronic illness    Previous Goals   Patient to consume % of nutritionally adequate meal trays TID, or the equivalent with supplements/snacks.  Evaluation: Not always meeting protein needs    Previous Nutrition Diagnosis  Predicted inadequate nutrient intake (protein-energy) related to eating mostly 100% of meals documented the past week with good appetite but potential for PO intake decline.   Evaluation: No change    CURRENT NUTRITION DIAGNOSIS  Predicted inadequate nutrient intake (protein-energy) related to eating mostly 100% of meals documented the past week with good appetite but potential for PO intake decline.     INTERVENTIONS  Implementation  Encouraged continued adequate intakes    Goals  Patient to consume % of nutritionally adequate meal trays TID, or the equivalent with supplements/snacks.    Monitoring/Evaluation  Progress toward goals will be monitored and evaluated per protocol.    Dee Alanis MS, RDN, LDN  RD pager: [phone]  WB Weekend/Holiday Pager: [phone]

## 2023-04-10 NOTE — PROGRESS NOTES
Pt A&Ox4. Pt allowed RN to pre medicate and turn over to place mepilex on coccyx. Coccyx very red/pink, non blanchable, possibly could develope damage. Pleasant this evening. Declined lidocaine patch and Voltaren gel this evening. Uses urinal at bedside. Can make needs known. No acute changes. Declined getting up to chair this dagmar. A2 liko for transfers.     Alma Gomez RN on 4/9/2023 at 10:03 PM

## 2023-04-11 NOTE — PROGRESS NOTES
04/11/23 1500   Name of Certified Therapeutic Rec Specialist   Name of Certified Therapeutic Rec Specialist KUMAR Riggs   Appointment Type   Type of Therapeutic Rec Session Therapeutic Rec Assessment   General Information   Patient Profile Review See Profile for full history and prior level of function   Daily Contact with Relatives or Friends Phone call;Visit   Pets Other (see comments)  (loves dogs)   Community Involvement   Community Involvement Disabled   Spiritual Practice Not connected/interested   Sees [facility] ? No   Outings Other (comments)  (weekly lunch with friends)   Music   Music Preferences Country;Rock   Listens to Recorded Music Yes   Brought Own Equipment Yes   Media   Computer Has laptop here;Has own at home   TV / Movies TV   Sports / Physical Activities   Outdoor Activities Lawn / yard care   Sports Fan Baseball;Basketball;Football;Hockey   Impression   Open to Socializing with Others Independent   Barriers to Leisure Mobility   Patient, family and / or staff in agreement with Plan of Care Yes   Treatment Plan   Interested in Unit [place]? No   Type of Intervention Independent with activity   Equipment and Supplies While on Unit None needed   Assessment Re assement completed, pt is known to this writer from lengthy hospitalization, pt was reoriented to role of rec therapy, pt likes to talk/visit, so will continue with visits from volunteer and signed up for pet therapy visits. no other needs at this time. will provide materials as requested.

## 2023-04-11 NOTE — PLAN OF CARE
Goal Outcome Evaluation:    No acute issues during this shift . Patient requested to sit on the recliner at 1600H , with 4 person assist due to left hip and neck pain. Patient is very sensitive with changes in position during transfer. Sacral foam dressing CDI. Ate late supper at 2000H. PRN meds given for neck pain: Tramadol and Robaxin / refused Voltaren gel and Lidocaine patch as those are not really helping per patient. Comfortable at this time. Will continue POC.         Patient's most recent vital signs are:     Vital signs:  BP: 125/63  Temp: 97  HR: 72  RR: 16  SpO2: 95 %     Patient does not have new respiratory symptoms.  Patient does not have new sore throat.  Patient does not have a fever greater than 99.5.

## 2023-04-11 NOTE — PROGRESS NOTES
[facility] Services   Internal Medicine Progress Note    Rehab Day # 4    Assessment & Plan:   Waldo Bustos is a 72 year old male with history of morbid obesity, DMII, HLD, JAIR, PE/DVT on apixaban, venous insufficiency, OA, and chronic L hip prosthetic joint infection who was admitted to [facility] 11/22/22 for explantation of L hip prosthesis, debridement, and reconstruction with antibiotic spacer. Course c/b encephalopathy (anesthesia and sedating meds), acute hypoxic respiratory failure (OHS), blood loss anemia, HALEY, and profound physical deconditioning. Transferred to TCU for rehabilitation. Now awaiting community placement after not making progress here     Physical Debility: PT/OT no longer working with patient after several months of in-bed therapy and refusals to mobilize. SW is following for community placement but this has been quite challenging     Chronic Left Hip PJI s/p explantation, debridement, and reconstruction with antibiotic spacer 11/22/2022: Per Dr. Meyers. Surgical cx +for Finegoldia magna and Proteus mirabilis. Completed 6 weeks of PO Cipro and Flagyl 1/3/23. Has higher risk for treatment failure given comorbidities, sinus tract, h/o PJI. Completed 8 wk antibiotic free holiday 1/9/2023. Left hip aspiration was attempted by IR 3/6 but no fluid could be obtained. S/p biopsy under anesthesia per Dr. Meyers 4/7 (to guide decision making about candidacy for proceeding w/ reimplantation of hip). All cultures NGTD  - Orthopedic surgery following peripherally culture results  - NWB LLE  - Encouraging up to chair daily, currently doing this approximately every other day via Baudilio lift  - Follow up in Ortho clinic with Dr. Meyers as scheduled on 4/24     Depression: Reports down mood, feeling hopeless in the setting of unknown surgical plan, time in bed. Denies SI, HI. Declines further d/w writer or psychiatry. Declines discussion of medication management. Contact medicine with  changes or concerns     Acute on Chronic Neck Pain, Intermittent Numbness and Tingling in Right Fingers: Chronic neck pain previously followed OP, no recent imaging on file. In early April, reported worsening muscle tightness/spasm from lying in bed as well as right 2nd-4th finger intermittent numbness and tingling. C/f compressive neuropathy d/t positioning off left hip in bed and/or cervical radiculopathy. He was not interested in workup beyond prior x-ray but was interested in better pain control so started on prn Ultram with improvement. No ongoing concerns communicated 4/11  - Continue Tylenol and Robaxin   - Continue PRN Tramadol as this worked well for patient in the past. Prefer not to restart prior oxycodone  - Advised heating pad followed and gentle stretching         Stable and Resolved Medical Issues:  JAIR: Continue CPAP  H/o DVT, PE: No acute concerns, continue Apixaban  HLD: Continue statin  DM II: A1C 5.8. Glucose stable. Continue Metformin, Actos, Victoza (being used here in place of PTA Trulicity). No plan to resume PTA Glipizide now or on discharge    The above was discuss with patient and attending physician, Dr. Harris who agrees with the above    CODE: Full Code  DVT: Apixaban  Diet: Regular   Indications for Psychotropic Medications: N/A  Disposition: Pending placement    Mae Moseley PA-C  Hospitalist Service  Pager: [phone]  ________________________________________________________________    Subjective & Interval History:    Feels frustrated, sad, hopeless about lack of plan going forward. Bored and frustrated by increased time in bed over the past several months. Denies SI or HI. Declines further discussion regarding mood and depression with writer. Declines psychiatry consult. Does not want to start medication for mood at this time. Appetite stable. No new pain. No fever or chills. No AMS    Last 24 hour care team notes reviewed.   ROS: 4 point ROS (including Respiratory, CV, GI  and ) was performed and negative unless otherwise noted in HPI.     Medications: Reviewed in EPIC.    Physical Exam:    General Appearance: Alert and oriented x3, flat affect. Laying in bed  HEENT: Anicteric sclera, MMM   Respiratory: Breathing comfortably on room air, lungs CTAB without wheezing or crackles   Cardiovascular: RRR, S1, S2. No murmur noted  GI: Abdomen soft, non-tender with active bowel sounds. No guarding or rebound  Lymph/Hematologic: No peripheral edema, distal pulses palpable   Skin: No rash or jaundice   Musculoskeletal: Moves all extremities   Neurologic: No focal deficits, CN II-XII grossly intact      Lines/Tubes/Drains:

## 2023-04-11 NOTE — CARE PLAN
"RN: Pt continues to refuse full body skin check and refuses turn and reposition, stays on back. \"it hurts to move\" prn ultram, robaxin, and tylenol given, pain in neck 10/10 and pt states nothing helps.  Declines lidoderm patch use and voltaren use. 1400 pt rates pain in neck at 6/10. States left leg only hurts when he moves and he refuses to move to avoid having pain he says. Using urinal at bedside, staff empties and records. Using bed pan on pm shift and gets lifted up via liko lift, will allow skin check to buttock/sacrum/coccyx at that time.  Did reinforce pt to do buttock squeezes to increase blood flow and to allow pm nurse to do thorough skin check when he gets lifted onto bed pan, and to allow nurse to treat area as needed with barrier cream or mepilex, or skin moisturizer, what ever is needed. Also did reinforce pt to perform ankle pumps and ankle rotations while in bed.     Patient's most recent vital signs are:     Vital signs:  BP: 132/64  Temp: 97.8  HR: 75  RR: 16  SpO2: 93 %     Patient does not have new respiratory symptoms.  Patient does not have new sore throat.  Patient does not have a fever greater than 99.5.         "

## 2023-04-11 NOTE — PLAN OF CARE
Goal Outcome Evaluation:    Pt is A&OX4, calm, & cooperative with care. Denied CP, SOB, & n/v. NWB to the LLE. A of 2 with Liko lift; was not OOB this shift. Continent for both B&B; uses bedpan for BM. Urinal at the bedside for bladder. Takes med whole with thin liquid. Dressing to the L hip biopsy site CDI. No acute issues overnight. Pt slept well for most of the night. Able to make needs known & call light within reach. Will continue with POC.    Patient's most recent vital signs are:     Vital signs:  BP: 125/63  Temp: 97  HR: 72  RR: 16  SpO2: 95 %     Patient does not have new respiratory symptoms.  Patient does not have new sore throat.  Patient does not have a fever greater than 99.5.

## 2023-04-12 NOTE — PLAN OF CARE
Goal Outcome Evaluation:  No acute issues overnight. Has no c/o's pain, discomfort, CP/SOB or any other c/o's as of yet tonight. Continent using urinal indep.- staff empties. Remains NWB LLE / liko lift transfers. Awaiting final bx results.        Patient's most recent vital signs are:     Vital signs:  BP: 131/57  Temp: 98.1  HR: 70  RR: 16  SpO2: 96 %     Patient does not have new respiratory symptoms.  Patient does not have new sore throat.  Patient does not have a fever greater than 99.5.

## 2023-04-12 NOTE — CARE PLAN
"RN: New orders for [facility] nurse to see pt buttock/sacral skin for treatment. Pt using prn pain meds for neck pain and \"somewhat helps\". Refusing skin check to back and buttock area, will allow staff a quick look when he gets onto bed pan with lift on pm shift but does not allow to treat. Refuses turn and reposition.     Patient's most recent vital signs are:     Vital signs:  BP: 129/59  Temp: 95.7  HR: 75  RR: 17  SpO2: 93 %     Patient does not have new respiratory symptoms.  Patient does not have new sore throat.  Patient does not have a fever greater than 99.5.          "

## 2023-04-12 NOTE — PLAN OF CARE
Goal Outcome Evaluation:    Pt is A&OX4, calm, & cooperative with care. Denied CP, SOB, & n/v. NWB to the LLE. A of 2 with Liko lift; was not OOB this shift. Continent for both B&B; uses bedpan for BM. Urinal at the bedside for bladder. Takes med whole with thin liquid. Dressing to the L hip biopsy site CDI. No acute issues overnight. Pt ate dinner with good appetite. Able to make needs known & call light within reach. Will continue with POC.    Patient's most recent vital signs are:     Vital signs:  BP: 131/57  Temp: 98.1  HR: 70  RR: 16  SpO2: 96 %     Patient does not have new respiratory symptoms.  Patient does not have new sore throat.  Patient does not have a fever greater than 99.5.          Statement Selected

## 2023-04-13 NOTE — PLAN OF CARE
Patient is alert and oriented x4. Able to make needs known to staff. Patient is continent of both bowel and bladder.  Urinal within reach which staff empties. Patient denied chest pain and SOB. Declined full skin check. Meds whole with water. Call-light within reach. Will continue with POC.    Patient's most recent vital signs are:     Vital signs:  BP: 127/67  Temp: 98.1  HR: 77  RR: 18  SpO2: 96 %     Patient does not have new respiratory symptoms.  Patient does not have new sore throat.  Patient does not have a fever greater than 99.5.

## 2023-04-13 NOTE — CONSULTS
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 11/22/22 for explantation of L hip prosthesis, debridement, and reconstruction with antibiotic spacer. Course c/b encephalopathy (anesthesia and sedating meds), acute hypoxic respiratory failure (OHS), blood loss anemia, HALEY, and profound physical deconditioning. Transferred to TCU for rehabilitation. Now awaiting community placement after not making progress here    Areas Assessed:      Areas visualized during today's visit: Buttocks, sacrum, coccyx, posterior thighs    Wound location: Left buttock/upper thigh        Last photo: 4/13/2023  Wound due to: Moisture Associated Skin Damage (MASD)  Wound history/plan of care: Patient resistant to turning  Wound base: 100 % dermis     Palpation of the wound bed: normal      Drainage: none     Description of drainage: none     Measurements (length x width x depth, in cm): 7  x 0.2  x  0.1 cm   Periwound skin: Denuded      Color: pink      Temperature: normal   Odor: none  Pain: denies   Pain interventions prior to dressing change: slow and gentle cares   Treatment goal: Decrease moisture  STATUS: initial assessment  Supplies ordered: supplies stored on unit     Treatment Plan:       Left posterior buttock/upper thigh wound(s): BID :     Cleanse the area with Marily cleanse and protect, very gently with soft cloth.    Apply thin layer of Triad Paste.    With repeat application, do not scrub the paste, only remove soiled paste and reapply.    If complete removal of paste is necessary use baby oil/mineral oil (#[id number]) and soft wash cloth.    Ensure pt has Isaac-cushion  (#[id number]) while sitting up in the chair.    Use only one Covidien pad in between mattress and pt. No brief while in bed.    Orders: Written    RECOMMEND PRIMARY TEAM ORDER: None, at this time  Education provided: importance of repositioning and plan of care  Discussed plan of care with: Patient and Nurse  WO nurse follow-up plan: weekly  Notify WOC if wound(s) deteriorate.  Nursing to notify the Provider(s) and re-consult the WOC Nurse if new skin concern.    DATA:     Current support surface: Bariatric Low air loss (BIN pump, Isolibrium, Pulsate, skin guard, etc)  Containment of urine/stool: Continent of bladder and Continent of bowel  BMI: Body mass index is 37.32 kg/m .   Active diet order: Orders Placed This Encounter      Regular Diet Adult     Output: I/O last 3 completed shifts:  In: -   Out: 375 [Urine:375]     Labs: No lab results found in last 7 days.    Invalid input(s): GLUCOMBO  Pressure injury risk assessment:   Sensory Perception: 3-->slightly limited  Moisture: 3-->occasionally moist  Activity: 2-->chairfast  Mobility: 2-->very limited  Nutrition: 3-->adequate  Friction and Shear: 2-->potential problem  Rafael Score: 15    Kristen Hadley RN CWOCN  Pager no longer is use, please contact through [address] group: [facility] Nurse  Dept. Office Number: [phone]

## 2023-04-13 NOTE — PROGRESS NOTES
SW met with pt.  Pt asked if SW heard anything from anyone.  SW said no.  SW asked if pt had heard anything from MA?  Pt said no. He asked for phone number for [facility].  SW gave it to him.     NEREYDA Sharp   [facility], Transitional Care Unit   Social Work   [address] [place] [address], [address] [place] [address]  ) [phone]

## 2023-04-13 NOTE — PLAN OF CARE
Pt is A&Ox4. He reported pain and requested medication twice this shift. Pt was agreeable to sitting in recliner, was up for 4+ hours. Pt was compliant with his medication. He was continent of B&B, used urinal that staff empties. Pt is transfer with ceiling lift.       Patient's most recent vital signs are:     Vital signs:  BP: 131/65  Temp: 96.7  HR: 77  RR: 16  SpO2: 94 %     Patient does not have new respiratory symptoms.  Patient does not have new sore throat.  Patient does not have a fever greater than 99.5.

## 2023-04-13 NOTE — PLAN OF CARE
Goal Outcome Evaluation:  No acute issues overnight. Awake during initial rounds, on phone and watching TV. Had no c/o's or requests @ that time. Continent using urinal indep.- staff empties. LBM yesterday. Remains NWB LLE and liko lift transfers.      Patient's most recent vital signs are:     Vital signs:  BP: 127/67  Temp: 98.1  HR: 77  RR: 18  SpO2: 96 %     Patient does not have new respiratory symptoms.  Patient does not have new sore throat.  Patient does not have a fever greater than 99.5.

## 2023-04-14 NOTE — PLAN OF CARE
Pt is A&Ox4. He reported pain and requested medication twice this shift. Pt was agreeable to sitting in recliner, was up in chair at 1345. Pt was compliant with his medication. He was continent of B&B, used urinal that staff empties. Pt is transfer with ceiling lift. Pt was in a good mood, outgoing and talkative. Pt endorsed having a good nights sleep.      Patient's most recent vital signs are:     Vital signs:  BP: 150/69  Temp: 96  HR: 73  RR: 16  SpO2: 95 %     Patient does not have new respiratory symptoms.  Patient does not have new sore throat.  Patient does not have a fever greater than 99.5.

## 2023-04-14 NOTE — PROGRESS NOTES
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 Course c/b encephalopathy (anesthesia and sedating meds), acute hypoxic respiratory failure (OHS), blood loss anemia, HALEY, and profound physical deconditioning. Transferred to TCU for rehabilitation. Now awaiting community placement after not making progress here     Physical deconditioning: PT/OT no longer working with patient after several months of in-bed therapy and refusals to mobilize. SW is following for community placement but this has been quite challenging     Chronic Left Hip PJI s/p explantation, debridement, and reconstruction with antibiotic spacer 11/22/2022: Per Dr. Meyers. Surgical cx +for Finegoldia magna and Proteus mirabilis. Completed 6 weeks of PO Cipro and Flagyl 1/3/23. Has higher risk for treatment failure given comorbidities, sinus tract, h/o PJI. Completed 8 wk antibiotic free holiday 1/9/2023. Left hip aspiration was attempted by IR 3/6 but no fluid could be obtained. S/p biopsy under anesthesia per Dr. Meyers 4/7 (to guide decision making about candidacy for proceeding w/ reimplantation of hip). Cultures NGTD  - Orthopedic surgery following peripherally culture results  - NWB LLE  - Encouraging up to chair daily, currently doing this approximately every other day via Baudilio lift  - Follow up in Ortho clinic with Dr. Meyers as scheduled 4/24     Depression: Reported down mood, feeling hopeless in the setting of unknown surgical plan, time in bed on 4/11. Declined further d/w writer or psychiatry. Declined discussion of medication management. Contact medicine with changes  or concerns     Acute on chronic neck pain, intermittent R finger numbness and tingling: Chronic neck pain previously followed OP, no recent imaging. In early April, reported worsening muscle tightness/spasm from lying in bed as well as right 2nd-4th finger intermittent numbness and tingling. C/f compressive neuropathy d/t positioning off left hip in bed and/or cervical radiculopathy. He was not interested in workup beyond prior x-ray but was interested in better pain control so started on prn Ultram with improvement. No ongoing concerns communicated 4/11 or 4/14  - Continue Tylenol and Robaxin   - Continue PRN Tramadol as this worked well for patient in the past. Prefer not to restart prior oxycodone  - Advised heating pad followed and gentle stretching         Stable and Resolved Medical Issues:  JAIR: Continue CPAP  H/o DVT, PE: No acute concerns, continue Apixaban  HLD: Continue statin  DM II: A1C 5.8. Glucose stable. Continue Metformin, Actos, Victoza (being used here in place of PTA Trulicity). No plan to resume PTA Glipizide now or on discharge    The above was discuss with patient and attending physician, Dr. Harris who agrees with the above    CODE: Full Code  DVT: Apixaban  Diet: Regular   Indications for Psychotropic Medications: N/A  Disposition: Pending placement    Mae Moseley PA-C  Hospitalist Service  Pager: [phone]  ________________________________________________________________    Subjective & Interval History:    No major changes. Tired today. Did not shower yesterday as planned per patient's whiteboard. Does not feel as if he needs a shower today. Appetite fair. No other major concerns. Would like to continue to rest for now    Last 24 hour care team notes reviewed.   ROS: 4 point ROS (including Respiratory, CV, GI and ) was performed and negative unless otherwise noted in HPI.     Medications: Reviewed in EPIC.    Physical Exam:    General Appearance: Alert and oriented x3, appears  tired but appropriately arouses to voice and discussion  HEENT: Anicteric sclera, MMM   Respiratory: Breathing comfortably on room air, lungs CTAB without wheezing or crackles   Cardiovascular: RRR, S1, S2. No murmur noted  GI: Abdomen soft, non-tender with active bowel sounds. No guarding or rebound  Lymph/Hematologic: No peripheral edema, distal pulses palpable   Skin: No rash or jaundice   Musculoskeletal: Moves all extremities   Neurologic: No focal deficits, CN II-XII grossly intact      Lines/Tubes/Drains:

## 2023-04-14 NOTE — PLAN OF CARE
[id number]  Patient is alert and oriented x4. Able to make needs known to staff. Patient is continent of both bowel and bladder.  Urinal within reach which staff empties. Patient denied chest pain and SOB. Declined PRN pain management. Comfortably sleeping during rounds.Call-light within reach. Will continue with POC.    Patient's most recent vital signs are:     Vital signs:  BP: 126/75  Temp: 97.6  HR: 73  RR: 18  SpO2: 95 %     Patient does not have new respiratory symptoms.  Patient does not have new sore throat.  Patient does not have a fever greater than 99.5.

## 2023-04-14 NOTE — PROGRESS NOTES
ZEB emailed nephew asking if anything happened with the vans while he was [place] of the country? His dad was going to work on it.     NEREYDA Sharp   [facility], Transitional Care Unit   Social Work   [facility] [address], [address] [place] [address]  (PH) [phone]

## 2023-04-15 NOTE — PLAN OF CARE
Goal Outcome Evaluation:  No acute issues overnight. Initial rounds watching TV. Had no c/o's @ that time. Continent using urinal indep.- staff empties. Subsequent rounds pt.appears sleeping well/snoring.        Patient's most recent vital signs are:     Vital signs:  BP: 123/69  Temp: 96.9  HR: 77  RR: 18  SpO2: 96 %     Patient does not have new respiratory symptoms.  Patient does not have new sore throat.  Patient does not have a fever greater than 99.5.

## 2023-04-15 NOTE — PLAN OF CARE
Goal Outcome Evaluation:         Pt alert and oriented x4, able to make needs known. No c/o chest pain, SOB, N/V. Was transferred back in bed before dinner, pt was sitting in recliner at the beginning of shift. No PRN medication given this shift, pt denied any pain. VSS. Compliant with medications. Will continue with POC.               Patient's most recent vital signs are:     Vital signs:  BP: 123/69  Temp: 96.9  HR: 77  RR: 18  SpO2: 96 %     Patient does not have new respiratory symptoms.  Patient does not have new sore throat.  Patient does not have a fever greater than 99.5.

## 2023-04-15 NOTE — PLAN OF CARE
Pt is A&Ox4. He reported pain and requested medication once this shift.  Pt was compliant with his medication. He was continent of B&B, used urinal that staff empties. Pt is transfer with ceiling lift. He is able to make his needs known.     Patient's most recent vital signs are:     Vital signs:  BP: 134/69  Temp: 96.3  HR: 72  RR: 18  SpO2: 95 %     Patient does not have new respiratory symptoms.  Patient does not have new sore throat.  Patient does not have a fever greater than 99.5.

## 2023-04-16 NOTE — PLAN OF CARE
Goal Outcome Evaluation:         Pt alert and oriented x4, able to make needs known. Had a large loose BM this evening, uses bedpan. Liko lift transfers. Takes pills whole with thin water. Pleasant and cooperative. Has good appetite. Will continue with POC.               Patient's most recent vital signs are:     Vital signs:  BP: 134/69  Temp: 96.3  HR: 72  RR: 18  SpO2: 95 %     Patient does not have new respiratory symptoms.  Patient does not have new sore throat.  Patient does not have a fever greater than 99.5.

## 2023-04-16 NOTE — PLAN OF CARE
Goal Outcome Evaluation:      Alert and correctly oriented and verbalizes his needs. Up in recliner for short time this afternoon. States it is painful to get up with Liko lift pad in inner thigh area even when padded. Skin in inner thighs, groin, and buttocks reddened and excoriated. Barrier cream applied, which he says if it is put on too thick, it irritates his skin. Refuses to reposition from side to side in bed. Sutures removed from biopsy site left anterior thigh and steri strips and primapore dressing applied.         Patient's most recent vital signs are:     Vital signs:  BP: 125/42  Temp: 96.9  HR: 77  RR: 18  SpO2: 94 %     Patient does not have new respiratory symptoms.  Patient does not have new sore throat.  Patient does not have a fever greater than 99.5.

## 2023-04-16 NOTE — PLAN OF CARE
Goal Outcome Evaluation:  No acute issues overnight. Up late watching TV. Had no c/o's or requests. Remains NWB LLE and liko lift transfers. Continent using urinal indep.- staff empties. LBM=lrg,loose yesterday using bedpan.        Patient's most recent vital signs are:     Vital signs:  BP: 134/69  Temp: 96.3  HR: 72  RR: 18  SpO2: 95 %     Patient does not have new respiratory symptoms.  Patient does not have new sore throat.  Patient does not have a fever greater than 99.5.

## 2023-04-17 NOTE — PLAN OF CARE
Goal Outcome Evaluation:  No acute issues overnight. Has no c/o's or requests during shift. Continent B&B, uses urinal indep.and bedpan. LBM yesterday. (L) anterior thigh bx site sutures removed yesterday, steristrips applied and primapore dsg. Remains NWB LLE / Liko transfers.        Patient's most recent vital signs are:     Vital signs:  BP: 125/42  Temp: 96.9  HR: 77  RR: 18  SpO2: 94 %     Patient does not have new respiratory symptoms.  Patient does not have new sore throat.  Patient does not have a fever greater than 99.5.

## 2023-04-17 NOTE — PLAN OF CARE
No changes. A & O X 4. VSS on RA. No CP or SOB. NWB LLE. A-2 likolift for transfers. Continent of B & B, uses urinal independently. Left anterior thigh dressing CDI. Call light in reach.        Patient's most recent vital signs are:     Vital signs:  BP: 138/68  Temp: 96  HR: 77  RR: 18  SpO2: 97 %     Patient does not have new respiratory symptoms.  Patient does not have new sore throat.  Patient does not have a fever greater than 99.5.

## 2023-04-17 NOTE — PLAN OF CARE
Goal Outcome Evaluation:    Pt is A&OX4, calm, & cooperative with care. Denied CP, SOB, & n/v. VSS & on RA. NWB to the LLE. A of 2 with Liko lift; was not OOB this shift. Continent for both B&B; uses bedpan for BM. Urinal at the bedside for bladder. Takes med whole with thin liquid. Dressing to the L hip biopsy site CDI. No acute issues this shift. Pt ate dinner with good appetite. Able to make needs known & call light within reach. Will continue with plan of care.    Patient's most recent vital signs are:     Vital signs:  BP: 125/42  Temp: 96.9  HR: 77  RR: 18  SpO2: 94 %     Patient does not have new respiratory symptoms.  Patient does not have new sore throat.  Patient does not have a fever greater than 99.5.

## 2023-04-17 NOTE — PROGRESS NOTES
"CLINICAL NUTRITION SERVICES - REASSESSMENT NOTE     Nutrition Prescription    RECOMMENDATIONS FOR MDs/PROVIDERS TO ORDER:  None at present.    Malnutrition Status:    Patient does not meet two of the established criteria necessary for diagnosing malnutrition    Recommendations already ordered by Registered Dietitian (RD):  - Encouraged continued adequate intakes.  - Ordered snacks/supplements PRN.  - Ordered new weight.    Future/Additional Recommendations:  Monitor PO intake.     EVALUATION OF THE PROGRESS TOWARD GOALS   Diet: Regular  Snacks/Supplements: PRN    Intake: Pt consumed 100% of most meals (0% for one meal). \"Per pt routine, eats 2 meals per day\" per flow sheet.  Average 2 day intake: 1077 kcals, 36 g protein.    NEW FINDINGS   Met with pt. He has been eating well. He has been eating more in the hospital than prior to admit, but it's mainly out of boredom. He doesn't really care for the food, but he does like the chicken quesadilla and mac and cheese. Not interested in supplements.    Weight:  Wt Readings from Last 10 Encounters:   04/10/23 118 kg (260 lb 1.6 oz)   03/23/23 117 kg (257 lb 14.4 oz)   01/09/23 119.7 kg (264 lb)   11/22/22 147.1 kg (324 lb 4.8 oz)   11/14/22 142.9 kg (315 lb)   11/09/22 146.8 kg (323 lb 9.6 oz)   05/19/22 136.1 kg (300 lb)   07/03/18 148.9 kg (328 lb 4.8 oz)     No new weights since last follow-up.    Labs: Reviewed.    Medications:  Victoza, metformin, actos  PRN miralax, senna    GI:  Last BM 4/16, stooling almost daily (no BM 4/13).    MALNUTRITION  % Intake: No decreased intake noted  % Weight Loss: None noted  Subcutaneous Fat Loss: None observed  Muscle Loss: Temporal:  mild, Upper arm (bicep, tricep):  mild and Posterior calf:  mild  Fluid Accumulation/Edema: None noted  Malnutrition Diagnosis: Patient does not meet two of the established criteria necessary for diagnosing malnutrition    Previous Goals   Patient to consume % of nutritionally adequate meal trays " TID, or the equivalent with supplements/snacks.  Evaluation: Met    Previous Nutrition Diagnosis  Predicted inadequate nutrient intake (protein-energy) related to eating mostly 100% of meals documented the past week with good appetite but potential for PO intake decline.   Evaluation: No change    CURRENT NUTRITION DIAGNOSIS  Predicted inadequate nutrient intake (protein-energy) related to eating mostly 100% of meals documented the past week with good appetite but potential for PO intake decline.     INTERVENTIONS  Implementation  Encouraged continued adequate intakes.    Goals  Patient to consume % of nutritionally adequate meal trays TID, or the equivalent with supplements/snacks.    Monitoring/Evaluation  Progress toward goals will be monitored and evaluated per protocol.    Robbie Suggs  Dietetic Intern

## 2023-04-18 NOTE — PLAN OF CARE
The patient is alert and oriented x3. VSS. Able to make needs known. Medicated with Tylenol, Robaxin and Tramadol x 1 for pain control. C/o discomfort on his back, neck, shoulder and intermittent numbness and tingling right 2nd to 4th fingers from laying on his back. Encouraged repositioning but declined. Ongoing refusal of repositioning at any time. Appetite is fair. Continent of bowel and bladder with urinal by the bedside. Protective dry dressing to left anterior thigh biopsy site is CDI. Patient was not out of bed all day and declined to sit in the chair for a while. No acute changes noted this evening. Continue to monitor for comfort.         Patient's most recent vital signs are:     Vital signs:  BP: 140/58  Temp: 95.5  HR: 70  RR: 18  SpO2: 97 %     Patient does not have new respiratory symptoms.  Patient does not have new sore throat.  Patient does not have a fever greater than 99.5.

## 2023-04-18 NOTE — PLAN OF CARE
Goal Outcome Evaluation:  No acute issues overnight. Denied pain, discomfort, CP/SOB. Continent using urinal indep.- staff empties. LBM = 04/16 using bedpan. Remains NWB LLE and liko lift transfers.         Patient's most recent vital signs are:     Vital signs:  BP: 129/59  Temp: 95.7  HR: 88  RR: 18  SpO2: 96 %     Patient does not have new respiratory symptoms.  Patient does not have new sore throat.  Patient does not have a fever greater than 99.5.

## 2023-04-18 NOTE — PROGRESS NOTES
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  He was admitted to [facility] 11/22/2022 for explantation of left hip prosthesis and reconstruction with antibiotic spacer.  Course c/b encephalopathy (anesthesia and sedating meds), acute hypoxic respiratory failure (OHS), blood loss anemia, HALEY, and profound physical deconditioning.  Transferred to TCU for rehabilitation.  Now awaiting community placement while not making progress with therapies.     Physical Debility  PT/OT no longer working with patient after several months of in-bed therapy and refusals to mobilize. SW is following for community placement but is delayed due to multiple challenges.     Chronic left hip PGI s/p explantation, debridement and reconstruction with antibiotic spacer 11/22/22  Per Dr. Meyers, surgical culture positive for Finegoldia magna and Proteus mirabilis.  Completed 6-week course of p.o. Cipro and Flagyl on 1/3/2023.  Has higher risk of failure given comorbidities, sinus track,h/o PJI.  Completed 8-week antibiotic free holiday on 1/9/2023.  Left hip aspiration was attempted by IR 3/6 but no fluid could be obtained. S/p biopsy under anesthesia per Dr Meyers 4/7 to guide plan of care and reimplantation of hip. All culture NGTD.   -NWB LLE  -Encouraging up to chair daily, currently doing this about every other day   -Follow up with Dr Meyers in Ortho clinic as scheduled 4/24 at 1425     Depression: Reports down mood, feeling hopeless in the setting of unknown surgical plan, time in bed. Denies SI, HI. Declines further d/w writer or psychiatry. Declines discussion of medication management. Contact medicine with changes or concerns      Acute on Chronic Neck Pain, Intermittent Numbness and Tingling  "in Right Fingers: Chronic neck pain previously followed OP, no recent imaging on file. In early April, reported worsening muscle tightness/spasm from lying in bed as well as right 2nd-4th finger intermittent numbness and tingling. C/f compressive neuropathy d/t positioning off left hip in bed and/or cervical radiculopathy. He was not interested in workup beyond prior x-ray but was interested in better pain control so started on prn Ultram with improvement, patient taking about once a day in AM. No ongoing concerns communicated 4/11  - Continue APAP 1000 mg every 8 hours PRN   -Continue Robaxin 500 mg QID PRN   -Continue PRN Tramadol 25 mg every 6 hours PRN as this worked well for patient in the past. Prefer not to restart prior oxycodone  -Advised heating pad followed and gentle stretching        Stable and Resolved Issues:  JAIR: Continue CPAP  H/o DVT, PE: No acute concerns, continue Apixaban  HLD: Continue statin  DM II: A1C 5.8. Glucose stable. Continue Metformin, Actos, Victoza (being used here in place of PTA Trulicity). No plan to resume PTA Glipizide now or on discharge    Consulting Services: Ortho      CODE: Full code  DVT: Apixaban  Diet: Regular  Indications for Psychotropic Medications: N/A  Vaccinations:   Disposition: Pending placement       Emperatriz Wright CNP, APRN  Internal Medicine PATRICK Hospitalist  [facility]  Pager [phone]    ________________________________________________________________    Subjective & Interval History:      Waldo Bustos is a 72 year old male in the TCU for ongoing skilled nursing. Pt states he is very fatigued today. Wants to sleep. Discussed upcoming appointment regarding hip. Pt hopes it will go well. States he is \"sick of being here\". Continue to encourage activity. Denies shortness of breath, chest pain, dyspnea, lightheadedness, dizziness, numbness, tingling. Denies n/v/d, constipation, abdominal pain. Denies joint swelling, increased warmth. Denies " feelings of anxiety.    Last 24 hour care team notes reviewed.   ROS: 4 point ROS (including Respiratory, CV, GI and ) was performed and negative unless otherwise noted in HPI.     Medications: Reviewed in EPIC.    Physical Exam:    Blood pressure (!) 140/58, pulse 70, temperature (!) 95.5  F (35.3  C), temperature source Oral, resp. rate 18, weight 118 kg (260 lb 1.6 oz), SpO2 97 %.    GENERAL: Alert and oriented x 3. Well nourished, well developed. Mild distress, sad.   HEENT: Normocephalic, atraumatic. Anicteric sclera. Mucous membranes moist.   CV: RRR. S1, S2. No murmurs appreciated.   RESPIRATORY: Effort normal on RA. Lungs CTAB with no wheezing, rales, or rhonchi.   GI: Abdomen soft and non distended, bowel sounds present x all 4 quadrants. No tenderness, rebound, or guarding.   NEUROLOGICAL: No focal deficits. Follows commands.  Strength weak in upper and lower extremities.   MUSCULOSKELETAL: No joint swelling or tenderness. Moves all extremities.   EXTREMITIES: No gross deformities. No peripheral edema.   SKIN: Grossly warm, dry, and intact. No jaundice. No rashes.       Lines/Tubes/Drains:          ROUTINE IP LABS (Last four results)  CMP   Recent Labs   Lab 23  0739 23  0745 23  1750 23  0812   GLC 84 91 124* 105*     CBC No lab results found in last 7 days.  INR No lab results found in last 7 days.    OTHER:  NA       Medical Decision Makin MINUTES SPENT BY ME on the date of service doing chart review, history, exam, documentation & further activities per the note.

## 2023-04-18 NOTE — PLAN OF CARE
Patient is alert and oriented x4. Able to make needs known to staff. Patient is continent of both bowel and bladder. Urinal within reach which staff empties. Patient denied chest pain and SOB. Call-light within reach. Will continue with POC.    Patient's most recent vital signs are:     Vital signs:  BP: 129/59  Temp: 95.7  HR: 88  RR: 18  SpO2: 96 %     Patient does not have new respiratory symptoms.  Patient does not have new sore throat.  Patient does not have a fever greater than 99.5.

## 2023-04-19 NOTE — PLAN OF CARE
No acute issue during shift. Alert and oriented x4. No reported sob and cp. Able to make needs known. Continent both bowel and bladder, uses urinal at bedside. liko lift for transfer. NWB status of LLE. Refuse skin assessment.  No complain of any pain or discomfort. No PRN medications given. Continue with the plan of care.      Patient's most recent vital signs are:     Vital signs:  BP: 111/68  Temp: 97.8  HR: 76  RR: 20  SpO2: 97 %     Patient does not have new respiratory symptoms.  Patient does not have new sore throat.  Patient does not have a fever greater than 99.5.

## 2023-04-19 NOTE — PLAN OF CARE
Goal Outcome Evaluation:               Alert and correctly oriented and verbalizes his needs. Did not get out of bed today. Uses urinal;bedpan. Biopsy site left anterior thigh healing-dressing changed. Good appetite. When transfers, uses Liko lift. BG-88 this morning. Ortho appt 4/24/23.        Patient's most recent vital signs are:     Vital signs:  BP: 119/64  Temp: 95.8  HR: 77  RR: 16  SpO2: 96 %     Patient does not have new respiratory symptoms.  Patient does not have new sore throat.  Patient does not have a fever greater than 99.5.

## 2023-04-20 NOTE — PLAN OF CARE
Goal Outcome Evaluation:         Did not get out of bed today. Frequently refuses to reposition in bed. Seen by WOCN-see note. Biopsy site left anterior thigh with steri strips-dressing changed. Alert and correctly oriented and verbalizes his needs. Has follow up ortho appointment on 4/24/23. Continent of bowel and bladder-uses urinal/bedpan. Received pain meds this morning with relief. Pleasant today, yet has flat affect.         Patient's most recent vital signs are:     Vital signs:  BP: 140/73  Temp: 95.7  HR: 71  RR: 18  SpO2: 93 %     Patient does not have new respiratory symptoms.  Patient does not have new sore throat.  Patient does not have a fever greater than 99.5.

## 2023-04-20 NOTE — PLAN OF CARE
Goal Outcome Evaluation:         Pt alert and oriented x4, able to make needs known. No acute changes during this shift, Had a large BM, redness and skin excoriation noted on buttocks, critic aid applied. VSS, stayed in bed all shift. Will continue with POC.      2300-0730H  No changes during the night, observed sleeping during night checks.         Patient's most recent vital signs are:     Vital signs:  BP: 117/66  Temp: 97.5  HR: 74  RR: 18  SpO2: 97 %     Patient does not have new respiratory symptoms.  Patient does not have new sore throat.  Patient does not have a fever greater than 99.5.

## 2023-04-20 NOTE — PROGRESS NOTES
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 Course c/b encephalopathy (anesthesia and sedating meds), acute hypoxic respiratory failure (OHS), blood loss anemia, HALEY, and profound physical deconditioning. Transferred to TCU for rehabilitation. Now awaiting community placement after not making progress here    Areas Assessed:      Areas visualized during today's visit: Buttocks, sacrum, coccyx, posterior thighs    Wound location: Left buttock/upper thigh        Last photo: 4/13/2023  Wound due to: Moisture Associated Skin Damage (MASD)  Wound history/plan of care: Patient resistant to turning  Wound base: 100 % epidermis  Healed on assessment today.     Treatment Plan:     Buttock for protection against moisture: BID :     Cleanse the area with Marily cleanse and protect, very gently with soft cloth.    Apply thin layer of CriticAid paste for protection.    With repeat application, do not scrub the paste, only remove soiled paste and reapply.    If complete removal of paste is necessary use baby oil/mineral oil (#089252) and soft wash cloth.    Ensure pt has Isaac-cushion (#057612) while sitting up in the chair.    Use only one Covidien pad in between mattress and pt. No brief while in bed.    Orders: Updated    RECOMMEND PRIMARY TEAM ORDER: None, at this time  Education provided: importance of repositioning and plan of care  Discussed plan of care with: Patient and Nurse  WOC nurse follow-up plan: signing off  Notify WOC if wound(s)  deteriorate.  Nursing to notify the Provider(s) and re-consult the [facility] Nurse if new skin concern.    DATA:     Current support surface: Bariatric Low air loss (BIN pump, Isolibrium, Pulsate, skin guard, etc)  Containment of urine/stool: Continent of bladder and Continent of bowel  BMI: Body mass index is 37.32 kg/m .   Active diet order: Orders Placed This Encounter      Regular Diet Adult     Output: I/O last 3 completed shifts:  In: 480 [P.O.:480]  Out: 600 [Urine:600]     Labs: No lab results found in last 7 days.    Invalid input(s): GLUCOMBO  Pressure injury risk assessment:   Sensory Perception: 4-->no impairment  Moisture: 3-->occasionally moist  Activity: 2-->chairfast  Mobility: 2-->very limited  Nutrition: 3-->adequate  Friction and Shear: 2-->potential problem  Rafael Score: 16    Kristen Hadley RN CWOCN  Pager no longer is use, please contact through [email or web address]   [place] group: [facility] Nurse  Dept. Office Number: [phone]

## 2023-04-21 NOTE — PLAN OF CARE
Goal Outcome Evaluation:         No acute changes, observed sleeping during rounds check. Continent of bowel and bladder. Uses urinal and bedpan Will continue with POC.

## 2023-04-21 NOTE — CARE PLAN
"    Patient's most recent vital signs are:     Vital signs:  BP: 140/76  Temp: 95.9  HR: 66  RR: 17  SpO2: 97 %     Patient does not have new respiratory symptoms.  Patient does not have new sore throat.  Patient does not have a fever greater than 99.5.       RN: pt states pain in neck 8.5/10. states he wants all of his pain meds prn at one time.  Consulted with pharmacist and she recommends spacing ultram and robaxin an hour apart. Ultram and ES tylenol prn given. Will continue to monitor.  Pt refuses skin check and turning.   1115 pt sleeping and awakens per voice call, states does not need further pain meds prn at this time, states \"it is ok now\".  "

## 2023-04-21 NOTE — PLAN OF CARE
Patient is alert and oriented x4. Able to make needs known to staff. Patient is continent of both bowel and bladder. Urinal within reach at bedside. Afebrile and denied chest pain, SOB, N&V, and cough. Remains NWB on LLE. Patient was not OOB this shift. Stated pain was manageable and declined PRN pain intervention.     Patient's most recent vital signs are:     Vital signs:  BP: 124/69  Temp: 96.9  HR: 75  RR: 18  SpO2: 96 %     Patient does not have new respiratory symptoms.  Patient does not have new sore throat.  Patient does not have a fever greater than 99.5.

## 2023-04-21 NOTE — PLAN OF CARE
Patient is alert and oriented x4. Able to make needs known to staff. Patient is continent of both bowel and bladder. Urinal within reach and bedpan provided upon request. Assist-2 with transfers with liko lift. Patient remains afebrile and denied chest pain, SOB, N&V, and cough. Not OOB this shift. Slept most of shift. Call-light within reach. Continue with POC.    Patient's most recent vital signs are:     Vital signs:  BP: 135/73  Temp: 96  HR: 72  RR: 18  SpO2: 95 %     Patient does not have new respiratory symptoms.  Patient does not have new sore throat.  Patient does not have a fever greater than 99.5.

## 2023-04-22 NOTE — PLAN OF CARE
Goal Outcome Evaluation:    Pt is A&OX4, calm, & cooperative with care. Denied CP, SOB, & n/v. NWB to the LLE. A of 2 with Liko lift; was not OOB this shift. Pt remains refusing repositioning on the bed. Continent for both B&B; uses bedpan for BM. Urinal at the bedside for bladder. Takes med whole with thin liquid. Pt slept well for most of the night & no acute issues. Able to make needs known & call light within reach. Will continue with plan of care.    Patient's most recent vital signs are:     Vital signs:  BP: 135/73  Temp: 96  HR: 72  RR: 18  SpO2: 95 %     Patient does not have new respiratory symptoms.  Patient does not have new sore throat.  Patient does not have a fever greater than 99.5.

## 2023-04-22 NOTE — PLAN OF CARE
Goal Outcome Evaluation:       Pt alert and oriented X4. Able to make needs known. Denied SOB, CP, N/V. PRN tramadol X1. Refused repositioning.         Patient's most recent vital signs are:     Vital signs:  BP: 135/75  Temp: 95.9  HR: 70  RR: 18  SpO2: 96 %     Patient does not have new respiratory symptoms.  Patient does not have new sore throat.  Patient does not have a fever greater than 99.5.

## 2023-04-23 NOTE — PHARMACY-MEDICATION REGIMEN REVIEW
Pharmacy Medication Regimen Review  Waldo Bustos is a 72 year old male who is currently in the Transitional Care Unit.    Assessment: All medications have an appropriate indications, durations and no unnecessary use was found.     Plan:   1. Continue current medications as ordered.     Attending provider will be sent this note for review.  If there are any emergent issues noted above, pharmacist will contact provider directly by phone.      Pharmacy will periodically review the resident's medication regimen for any PRN medications not administered in > 72 hours and discontinue them. The pharmacist will discuss gradual dose reductions of psychopharmacologic medications with interdisciplinary team on a regular basis.    Please contact pharmacy if the above does not answer specific medication questions/concerns.    Background:  A pharmacist has reviewed all medications and pertinent medical history today.  Medications were reviewed for appropriate use and any irregularities found are listed with recommendations.      Current Facility-Administered Medications:      acetaminophen (TYLENOL) tablet 1,000 mg, 1,000 mg, Oral, Q8H PRN, Liliya Lai PA, 1,000 mg at 04/23/23 0955     apixaban ANTICOAGULANT (ELIQUIS) tablet 5 mg, 5 mg, Oral, BID, Liliya Lai PA, 5 mg at 04/23/23 0955     atorvastatin (LIPITOR) tablet 40 mg, 40 mg, Oral, Daily, Liliya Lai PA, 40 mg at 04/23/23 0955     calcium carbonate (TUMS) chewable tablet 500 mg, 500 mg, Oral, TID PRN, Liliya Lai PA     glucose gel 15-30 g, 15-30 g, Oral, Q15 Min PRN **OR** dextrose 50 % injection 25-50 mL, 25-50 mL, Intravenous, Q15 Min PRN **OR** glucagon injection 1 mg, 1 mg, Subcutaneous, Q15 Min PRN, Liliya Lai PA     liraglutide (VICTOZA) injection 1.8 mg, 1.8 mg, Subcutaneous, Daily, Liliya Lai PA, 1.8 mg at 04/23/23 0956     metFORMIN (GLUCOPHAGE XR) 24 hr tablet 2,000 mg, 2,000 mg, Oral, Joelle CLARK  MIRANDA Rangel, 2,000 mg at 04/23/23 0956     methocarbamol (ROBAXIN) tablet 500 mg, 500 mg, Oral, 4x Daily PRN, Liliya Lai PA, 500 mg at 04/23/23 0955     miconazole (MICATIN) 2 % powder, , Topical, BID, Liliya Lai PA, Given at 04/23/23 0956     naloxone (NARCAN) injection 0.2 mg, 0.2 mg, Intravenous, Q2 Min PRN **OR** naloxone (NARCAN) injection 0.4 mg, 0.4 mg, Intravenous, Q2 Min PRN **OR** naloxone (NARCAN) injection 0.2 mg, 0.2 mg, Intramuscular, Q2 Min PRN **OR** naloxone (NARCAN) injection 0.4 mg, 0.4 mg, Intramuscular, Q2 Min PRN, Liliya Lai PA     Nurse may request from Pharmacy a change of form of medication (e.g. Liquid to tablet)., , Does not apply, Continuous PRN, Liliya Lai PA     ondansetron (ZOFRAN ODT) ODT tab 4 mg, 4 mg, Oral, Q6H PRN, Liliya Lai PA     Patient is already receiving anticoagulation with heparin, enoxaparin (LOVENOX), warfarin (COUMADIN)  or other anticoagulant medication, , Does not apply, Continuous PRN, Liliya Lai PA     pioglitazone (ACTOS) tablet 45 mg, 45 mg, Oral, Daily, Liliya Lai PA, 45 mg at 04/23/23 0955     polyethylene glycol (MIRALAX) Packet 17 g, 17 g, Oral, Daily PRN, Liliya Lai PA     senna-docusate (SENOKOT-S/PERICOLACE) 8.6-50 MG per tablet 2 tablet, 2 tablet, Oral, BID PRN, Liliya Lai PA     traMADol (ULTRAM) half-tab 25 mg, 25 mg, Oral, Q6H PRN, Liliya Lai PA, 25 mg at 04/22/23 2201  No current outpatient prescriptions on file.   PMH: morbid obesity, DM II, HLD, JAIR, PE/DVT on chronic apixaban, venous insufficiency, osteoarthritis, and chronic left hip prosthetic joint infection     Julio Howard, PharmD

## 2023-04-23 NOTE — PLAN OF CARE
Goal Outcome Evaluation:    Pt is A&OX4, calm, & cooperative with care. Denied CP, SOB, & n/v. NWB to the LLE. A of 2 with Liko lift; was not OOB this shift. Pt remains refusing repositioning on the bed. Continent for both B&B; uses bedpan for BM. Urinal at the bedside for bladder. Takes med whole with thin liquid. Pt slept well for most of the night & no acute issues. Able to make needs known & call light within reach. Will continue with POC.    Patient's most recent vital signs are:     Vital signs:  BP: 147/60  Temp: 95.8  HR: 72  RR: 16  SpO2: 94 %     Patient does not have new respiratory symptoms.  Patient does not have new sore throat.  Patient does not have a fever greater than 99.5.

## 2023-04-23 NOTE — PLAN OF CARE
Patient is alert and oriented x4. Able to make needs known to staff. Patient is continent of both bowel and bladder. Urinal within reach and bed pan provided upon request. Large BM this shift. Bedding and sheets changed. CriticAid paste applied to buttocks. Denied chest pain, SOB, and N&V. Call-light within reach. Continue with POC.    Patient's most recent vital signs are:     Vital signs:  BP: 147/60  Temp: 95.8  HR: 72  RR: 16  SpO2: 94 %     Patient does not have new respiratory symptoms.  Patient does not have new sore throat.  Patient does not have a fever greater than 99.5.

## 2023-04-23 NOTE — PROGRESS NOTES
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  He was admitted to [facility] 11/22/2022 for explantation of left hip prosthesis and reconstruction with antibiotic spacer.  Course c/b encephalopathy (anesthesia and sedating meds), acute hypoxic respiratory failure (OHS), blood loss anemia, HALEY, and profound physical deconditioning.  Transferred to TCU for rehabilitation.  Now awaiting community placement while not making progress with therapies.     Physical Debility  PT/OT no longer working with patient after several months of in-bed therapy and refusals to mobilize. SW is following for community placement but is delayed due to multiple challenges.     Chronic left hip PGI s/p explantation, debridement and reconstruction with antibiotic spacer 11/22/22  Per Dr. Meyers, surgical culture positive for Finegoldia magna and Proteus mirabilis.  Completed 6-week course of p.o. Cipro and Flagyl on 1/3/2023.  Has higher risk of failure given comorbidities, sinus track,h/o PJI.  Completed 8-week antibiotic free holiday on 1/9/2023.  Left hip aspiration was attempted by IR 3/6 but no fluid could be obtained. S/p biopsy under anesthesia per Dr Meyers 4/7 to guide plan of care and reimplantation of hip. All culture NGTD.   -NWB LLE  -Encouraging up to chair daily, currently doing this about every other day   -Follow up with Dr Meyers in Ortho clinic as scheduled 4/24 at 1425     Depression: Reports down mood, feeling hopeless in the setting of unknown surgical plan, time in bed. Denies SI, HI. Declines further d/w writer or psychiatry. Declines discussion of medication management. Contact medicine with changes or concerns      Acute on Chronic Neck Pain, Intermittent Numbness and Tingling  in Right Fingers: Chronic neck pain previously followed OP, no recent imaging on file. In early April, reported worsening muscle tightness/spasm from lying in bed as well as right 2nd-4th finger intermittent numbness and tingling. C/f compressive neuropathy d/t positioning off left hip in bed and/or cervical radiculopathy. He was not interested in workup beyond prior x-ray but was interested in better pain control so started on prn Ultram with improvement, patient taking about once a day in AM. No ongoing concerns communicated 4/11  - Continue APAP 1000 mg every 8 hours PRN   -Continue Robaxin 500 mg QID PRN   -Continue PRN Tramadol 25 mg every 6 hours PRN as this worked well for patient in the past. Prefer not to restart prior oxycodone  -Advised heating pad followed and gentle stretching      Elevated blood pressures  Occasional high SBP in the 140s.   -no indication for initiating antihypertensive medication at this time    Stable and Resolved Issues:  JAIR: Continue CPAP  H/o DVT, PE: No acute concerns, continue Apixaban  HLD: Continue statin  DM II: A1C 5.8. Glucose stable in the 80-90 range. Continue Metformin, Actos, Victoza (being used here in place of PTA Trulicity). No plan to resume PTA Glipizide now or on discharge    Consulting Services: Ortho      CODE: Full code  DVT: Apixaban  Diet: Regular  Indications for Psychotropic Medications: N/A  Vaccinations:   Disposition: Pending placement       Emperatriz Wright, CNP, APRN  Internal Medicine [facility] Hospitalist  [facility]  Pager [phone]    ________________________________________________________________    Subjective & Interval History:      Waldo Bustos is a 72 year old male in the TCU for ongoing skilled nursing. Pt states he is very fatigued today. Wants to sleep. Discussed appointment with Ortho for tomorrow afternoon. Pt denies any new issues or concerns.  Denies shortness of breath, chest pain, dyspnea, lightheadedness, dizziness, numbness,  tingling. Denies n/v/d, constipation, abdominal pain. Denies joint swelling, increased warmth. Denies feelings of anxiety.    Last 24 hour care team notes reviewed.   ROS: 4 point ROS (including Respiratory, CV, GI and ) was performed and negative unless otherwise noted in HPI.     Medications: Reviewed in EPIC.    Physical Exam:    Blood pressure (!) 147/60, pulse 72, temperature (!) 95.8  F (35.4  C), temperature source Oral, resp. rate 16, weight 118 kg (260 lb 1.6 oz), SpO2 94 %.    GENERAL: Alert and oriented x 3. Well nourished, well developed. Mild distress, sad.   HEENT: Normocephalic, atraumatic. Anicteric sclera. Mucous membranes moist.   CV: RRR. S1, S2. No murmurs appreciated.   RESPIRATORY: Effort normal on RA. Lungs CTAB with no wheezing, rales, or rhonchi.   GI: Abdomen soft and non distended, bowel sounds present x all 4 quadrants. No tenderness, rebound, or guarding.   NEUROLOGICAL: No focal deficits. Follows commands.  Strength weak in upper and lower extremities.   MUSCULOSKELETAL: No joint swelling or tenderness. Moves all extremities.   EXTREMITIES: No gross deformities. No peripheral edema.   SKIN: Grossly warm, dry, and intact. No jaundice. No rashes.       Lines/Tubes/Drains:          ROUTINE IP LABS (Last four results)  CMP   Recent Labs   Lab 04/22/23  1715 04/21/23  1659 04/21/23  0815 04/20/23  1855   GLC 77 82 78 82     CBC No lab results found in last 7 days.  INR No lab results found in last 7 days.    OTHER:  NA       Medical Decision Making:         15 MINUTES SPENT BY ME on the date of service doing chart review, history, exam, documentation & further activities per the note.

## 2023-04-23 NOTE — PLAN OF CARE
Goal Outcome Evaluation:       Pt alert and oriented X4. Able to make needs known. Naps frequently on shift. Denied SOB, CP, N/V. Continent of B&B. Pt refused repositioning. Able to turn X1 to assess buttock. Barrior cream applied.         Patient's most recent vital signs are:     Vital signs:  BP: 126/73  Temp: 95.8  HR: 63  RR: 14  SpO2: 98 %     Patient does not have new respiratory symptoms.  Patient does not have new sore throat.  Patient does not have a fever greater than 99.5.

## 2023-04-24 NOTE — PROGRESS NOTES
SW left a vm for nephew/Luke asking where they are with MA?  Did the vehicles get taken care of yet?     NEREYDA Sharp   [facility], Transitional Care Unit   Social Work   [facility] [address] [place] [address]  (PH) [phone]

## 2023-04-24 NOTE — LETTER
4/24/2023         RE: Waldo Bustos  [address]  [place] [facility] [address]        Dear Colleague,    Thank you for referring your patient, Waldo Bustos, to the [facility] [place]. Please see a copy of my visit note below.       [facility]  Orthopaedic Surgery, Joint Replacement Consultation  by Rom Meyers M.D.     Waldo Bustos MRN# [id number]     YOB: 1950      Requesting physician: No ref. provider found  Jv Felix, PCP  Rogelio Shi MD [facility]  Vick, MD Rama Parish [place], [place]      Background history:  DX:  Morbid obesity Body mass index is 45.2 kg/m .  Diabetes mellitus  Left BELLA prosthetic joint infection  Hx of PE. Long term anticoag     TREATMENTS:  3/7/2018, [facility] two [facility]  1/23/2019, revision left total hip arthroplasty [facility]. IV Ceftriaxone x 4 weeks for C Acnes  2/2021, L hip aspiration ([facility])  4/6/2022, C Acnes  4/27/2022, drain placement. C Acnes.  11/14/2022 L hip aspiration 3+ Proteus mirabilis , 4+ Porphyromonas sp.  11/22/2022, explantation L BELLA, non-articulating PMMA spacer ([facility]) [facility]. Proteus mirabilis, Finegoldia magna, zosyn x 1 wk > po cipro/po metronidazole x 6 weeks from 11/22/22.     HPI:   Rahat is a 72-year-old male who presents today for review of percutaneous biopsy results.  He has been staying at a transitional care unit and compliant with toe-touch weightbearing.  He is anxious to schedule the surgery.  No interval changes from previous visit.    The patient is concerned that he is losing upper body strength and is having difficulty with transfers.  He is requesting physical therapy for upper body strength today    Examination:      Recent Labs   Lab Test 03/30/23  1054 03/06/23  0733 03/03/23  1313 02/08/23  0629 02/06/23  0839 02/01/23  0709 01/30/23  0835 01/25/23  0740 01/23/23  0748 01/16/23  0748 01/09/23  1212 12/21/22  0833  12/19/22  0905   HGB 11.0* 11.4* 12.2*   < > 11.0*   < > 10.5*   < > 12.0*   < > 11.4*   < >  --    SED  --   --   --   --   --   --   --   --   --   --  77*  --  86*   CRP  --   --   --   --  3.6  --  <2.9  --  <2.9   < > <2.9   < > 6.1   WBC 8.5 10.6 10.7   < > 9.4   < > 8.2   < > 9.1   < > 9.2   < >  --     < > = values in this interval not displayed.     Recent Labs   Lab Test 04/07/23  1132   FNEU 65   FCOL Red*   FAPR Bloody*   FWBC 13,953       SYNOVASURE (ALPHA DEFENSIN LFA)  No results found for: 801632    4/7/2023 aerobic and anaerobic cultures: Negative    4/24/2023 AP pelvis lateral left hip: Antibiotic spacer in place with no signs of fracture.  Cerclage wires intact with callus remission noted in osteotomy site.        Impression:  72-year-old male with history of prosthetic joint infection who is status post explantation of total hip arthroplasty and placement of antibiotic spacer.    Plan:  Continue toe-touch weightbearing  As all the cultures have been negative for infection we will schedule his revision left total hip arthroplasty for the near future  The patient will speak with our nurse today about preoperative planning and expect a call in regards to scheduling the surgery  Physical therapy orders placed for upper body strengthening      Rom Meyers MD  [facility] Family Professor  Oncology and Adult Reconstructive Surgery  Dept Orthopaedic Surgery, [facility]  [phone] office, [phone] pager  [email or web address]    Total combined visit time and work time before and after clinic visit on encounter date = 20 min

## 2023-04-24 NOTE — PLAN OF CARE
Goal Outcome Evaluation:       Pt alert and oriented X4. Able to make needs known. Naps frequently on shift. Denied SOB, CP, N/V. Continent of B&B. Pt refused repositioning. Able to turn X1 to assess buttock. Barrior cream applied. pt went to ortho follow up in . Tolerated well.         Patient's most recent vital signs are:     Vital signs:  BP: 125/51  Temp: 96.5  HR: 72  RR: 16  SpO2: 92 %     Patient does not have new respiratory symptoms.  Patient does not have new sore throat.  Patient does not have a fever greater than 99.5.

## 2023-04-24 NOTE — NURSING NOTE
Chief Complaint   Patient presents with     RECHECK     2 Weeks post op from left hip synovium biopsy. DOS: 04/07/2023       72 year old  1950    There were no vitals taken for this visit.    Past Surgical History:   Procedure Laterality Date     ASPIRATION NEEDLE HIP Left 4/7/2023    Procedure: ARTHROCENTESIS, LEFT HIP;  Surgeon: Rom Meyers MD;  Location: [facility] OR     Cataract       COLONOSCOPY       EGD       IR FINE NEEDLE ASPIRATION W ULTRASOUND  3/6/2023     IR IVC FILTER PLACEMENT      removed     IRRIGATION AND DEBRIDEMENT HIP, PLACE ANTIBIOTIC CEMENT BEADS / SPACER Left 11/22/2022    Procedure: and Reconstruction Using G 20 Prosthetic Antibiotic Cement Spacer;  Surgeon: Rom Meyers MD;  Location: [facility] OR     REMOVE HARDWARE ARTHROPLASTY HIP Left 11/22/2022    Procedure: Explantation of Chronic  Infected Left Total Hip Arthroplasty, Extended Trochanteric Osteotomy with Extensive Debridement;  Surgeon: Rom Meyers MD;  Location: [facility] OR     SYNOVIAL BIOPSY HIP Left 4/7/2023    Procedure: BIOPSY, SYNOVIUM, LEFT  HIP, percutaneous;  Surgeon: Rom Meyers MD;  Location: [facility] OR     ZZC PELVIS/HIP JOINT SURGERY UNLISTED       [facility] STOMACH SURGERY PROCEDURE UNLISTED  1954    2 Hernia surgeries as a child                 Pain Assessment  Patient Currently in Pain: Denies                           [id number] [address] [facility] [facility] [address] [facility]        Allergies   Allergen Reactions     Sulfa Drugs      Other reaction(s): *Unknown - Childhood Rxn     Tizanidine Other (See Comments)     Frequent urination; causes drowsiness, and dry mouth             No current facility-administered medications for this visit.     No current outpatient medications on file.     Facility-Administered Medications Ordered in Other Visits   Medication     acetaminophen (TYLENOL) tablet 1,000 mg     apixaban ANTICOAGULANT (ELIQUIS) tablet 5 mg     atorvastatin (LIPITOR) tablet 40 mg      calcium carbonate (TUMS) chewable tablet 500 mg     glucose gel 15-30 g    Or     dextrose 50 % injection 25-50 mL    Or     glucagon injection 1 mg     liraglutide (VICTOZA) injection 1.8 mg     metFORMIN (GLUCOPHAGE XR) 24 hr tablet 2,000 mg     methocarbamol (ROBAXIN) tablet 500 mg     miconazole (MICATIN) 2 % powder     naloxone (NARCAN) injection 0.2 mg    Or     naloxone (NARCAN) injection 0.4 mg    Or     naloxone (NARCAN) injection 0.2 mg    Or     naloxone (NARCAN) injection 0.4 mg     Nurse may request from Pharmacy a change of form of medication (e.g. Liquid to tablet).     ondansetron (ZOFRAN ODT) ODT tab 4 mg     Patient is already receiving anticoagulation with heparin, enoxaparin (LOVENOX), warfarin (COUMADIN)  or other anticoagulant medication     pioglitazone (ACTOS) tablet 45 mg     polyethylene glycol (MIRALAX) Packet 17 g     senna-docusate (SENOKOT-S/PERICOLACE) 8.6-50 MG per tablet 2 tablet     traMADol (ULTRAM) half-tab 25 mg             Questionnaires:    HOOS Hip Dysfunction & Osteoarthritis Outcome Questionnaire        3/20/2023    11:40 AM   Hip Dysfunction & Osteoarthritis Outcome Score (HOOS), English Version LK 2.0 (Irene M, Tenzin E, Milly SPENCE., 2003)   S1. Do you feel grinding, hear clicking or any other type of noise from your hip? Sometimes   S2. Difficulties spreading legs wide apart Moderate   S3. Difficulties to stride out when walking Extreme   S4. How severe is your hip joint stiffness after first wakening in the morning? Moderate   S5. How severe is your hip stiffness after sitting, lying or resting LATER IN THE DAY? Moderate   Symptom Count 5   Symptom Sum 12   Symptom Mean 2.4   Symptom Subscale Score 40   P1. How often is your hip painful? Daily   P2. Straightening your hip fully Moderate   P3. Bending your hip FULLY Severe   P4. Walking on a flat surface Extreme   P5. Going up or down stairs Extreme   P6. At night while in bed Moderate   P7. Sitting or lying  Moderate   P8. Standing upright Extreme   P9. Walking on a hard surface (asphalt, concrete, etc.) Extreme   P10. Walking on an uneven surface Extreme   Pain Count 10   Pain Sum 32   Pain Mean 3.2   Pain Subscale Score 20   A1. Descending stairs Extreme   A2. Ascending stairs Extreme   A3. Rising from sitting Extreme   A4. Standing Extreme   A5. Bending to the floor/ an object Extreme   A6. Walking on a flat surface Extreme   A7. Getting in/out of car Extreme   A8. Going shopping Extreme   A9. Putting on socks/stockings Extreme   A10. Rising from bed Extreme   A11. Taking off socks/stockings Severe   A12. Lying in bed (turning over, maintaining hip position) Severe   A13. Getting in/out of bed Extreme   A14. Sitting Moderate   A15. Getting on/off toilet Extreme   A16. Heavy domestic duties (moving heavy boxes, scrubbing floors, etc.) Extreme   A17. Light domestic duties (cooking, dusting, etc.) Extreme   ADL Count 17   ADL Sum 64   ADL Mean 3.76   ADL Subscale Score 5.88   SP1. Squatting Extreme   SP2. Running Extreme   SP3. Twisting/pivoting on loaded leg Extreme   SP4. Walking on uneven surface Extreme   Sports/Rec Count 4   Sports/Rec Sum 16   Sports/Rec Mean 4   Sports/Rec Subscale Score 0   Q1. How often are you aware of your hip problem? Constantly   Q2. Have you modified you life style to avoid activities potentially damaging to your hip? Totally   Q3. How much are you troubled with lack of confidence in your hip? Extremely   Q4. In general, how much difficulty do you have with your hip? Extreme   QOL Count 4   QOL Sum 16   QOL Mean 4   Quality of Life Subscale Score 0              KOOS Knee Survey Assessment         View : No data to display.                       Promis 10 Assessment        3/20/2023    11:43 AM   PROMIS 10   In general, would you say your health is: Fair   In general, would you say your quality of life is: Fair   In general, how would you rate your physical health? Fair   In general,  how would you rate your mental health, including your mood and your ability to think? Fair   In general, how would you rate your satisfaction with your social activities and relationships? Fair   In general, please rate how well you carry out your usual social activities and roles Poor   To what extent are you able to carry out your everyday physical activities such as walking, climbing stairs, carrying groceries, or moving a chair? Not at all   In the past 7 days, how often have you been bothered by emotional problems such as feeling anxious, depressed, or irritable? Often   In the past 7 days, how would you rate your fatigue on average? Moderate   In the past 7 days, how would you rate your pain on average, where 0 means no pain, and 10 means worst imaginable pain? 8   In general, would you say your health is: 2   In general, would you say your quality of life is: 2   In general, how would you rate your physical health? 2   In general, how would you rate your mental health, including your mood and your ability to think? 2   In general, how would you rate your satisfaction with your social activities and relationships? 2   In general, please rate how well you carry out your usual social activities and roles. (This includes activities at home, at work and in your community, and responsibilities as a parent, child, spouse, employee, friend, etc.) 1   To what extent are you able to carry out your everyday physical activities such as walking, climbing stairs, carrying groceries, or moving a chair? 1   In the past 7 days, how often have you been bothered by emotional problems such as feeling anxious, depressed, or irritable? 4   In the past 7 days, how would you rate your fatigue on average? 3   In the past 7 days, how would you rate your pain on average, where 0 means no pain, and 10 means worst imaginable pain? 8   Global Mental Health Score 8   Global Physical Health Score 8   PROMIS TOTAL - SUBSCORES 16               Ortho [place] Knee Questionnaire         View : No data to display.

## 2023-04-24 NOTE — PLAN OF CARE
Patient is alert and oriented x4. Able to make needs known to staff. Patient is continent of both bowel and bladder. Assist-2 with liko lift for transfers. Was not OOB this shift. Denied chest pain and SOB. Call-light within reach. Continue with POC.    Patient's most recent vital signs are:     Vital signs:  BP: 144/62  Temp: 97.9  HR: 68  RR: 16  SpO2: 97 %     Patient does not have new respiratory symptoms.  Patient does not have new sore throat.  Patient does not have a fever greater than 99.5.         RLQ

## 2023-04-24 NOTE — PLAN OF CARE
Goal Outcome Evaluation:       Overall Pt had no acute issue this shift. Alert and oriented x 4. Able to make needs known to staffs. Requires assistance of 1 to 2 with cares. Denied discomfort this shift. Will continue with POC.       Patient's most recent vital signs are:     Vital signs:  BP: 144/62  Temp: 97.9  HR: 68  RR: 16  SpO2: 97 %     Patient does not have new respiratory symptoms.  Patient does not have new sore throat.  Patient does not have a fever greater than 99.5.

## 2023-04-24 NOTE — PROGRESS NOTES
[facility]  Orthopaedic Surgery, Joint Replacement Consultation  by Rom Meyers M.D.     Waldo Bustos MRN# [id number]     YOB: 1950      Requesting physician: No ref. provider found  Jv Felix, PCP  MD Pooja Werner orthopedics  Jem Hickman MD [facility], [place], [place]      Background history:  DX:  1. Morbid obesity Body mass index is 45.2 kg/m .  2. Diabetes mellitus  3. Left BELLA prosthetic joint infection  4. Hx of PE. Long term anticoag     TREATMENTS:  1. 3/7/2018, left two [facility]  2. 1/23/2019, revision left total hip arthroplasty ([facility]) [facility]. IV Ceftriaxone x 4 weeks for C Acnes  3. 2/2021, L hip aspiration ([facility])  4. 4/6/2022, C Acnes  5. 4/27/2022, drain placement. C Acnes.  6. 11/14/2022 L hip aspiration 3+ Proteus mirabilis , 4+ Porphyromonas sp.  7. 11/22/2022, explantation L BELLA, non-articulating PMMA spacer (Luigi) [facility]. Proteus mirabilis, Finegoldia magna, zosyn x 1 wk > po cipro/po metronidazole x 6 weeks from 11/22/22.     HPI:   Rahat is a 72-year-old male who presents today for review of percutaneous biopsy results.  He has been staying at a transitional care unit and compliant with toe-touch weightbearing.  He is anxious to schedule the surgery.  No interval changes from previous visit.    The patient is concerned that he is losing upper body strength and is having difficulty with transfers.  He is requesting physical therapy for upper body strength today    Examination:      Recent Labs   Lab Test 03/30/23  1054 03/06/23  0733 03/03/23  1313 02/08/23  0629 02/06/23  0839 02/01/23  0709 01/30/23  0835 01/25/23  0740 01/23/23  0748 01/16/23  0748 01/09/23  1212 12/21/22  0833 12/19/22  0905   HGB 11.0* 11.4* 12.2*   < > 11.0*   < > 10.5*   < > 12.0*   < > 11.4*   < >  --    SED  --   --   --   --   --   --   --   --   --   --  77*  --  86*   CRP  --   --   --   --  3.6  --  <2.9  --  <2.9   < > <2.9   <  > 6.1   WBC 8.5 10.6 10.7   < > 9.4   < > 8.2   < > 9.1   < > 9.2   < >  --     < > = values in this interval not displayed.     Recent Labs   Lab Test 04/07/23  1132   FNEU 65   FCOL Red*   FAPR Bloody*   FWBC 13,953       SYNOVASURE (ALPHA DEFENSIN LFA)  No results found for: 280255    4/7/2023 aerobic and anaerobic cultures: Negative    4/24/2023 AP pelvis lateral left hip: Antibiotic spacer in place with no signs of fracture.  Cerclage wires intact with callus remission noted in osteotomy site.        Impression:  72-year-old male with history of prosthetic joint infection who is status post explantation of total hip arthroplasty and placement of antibiotic spacer.    Plan:    Continue non-weightbearing    As all the cultures have been negative for infection we will schedule his revision left total hip arthroplasty for the near future    The patient will speak with our nurse today about preoperative planning and expect a call in regards to scheduling the surgery    Physical therapy orders placed for upper body strengthening      MD Vanessa Stephenson Family Professor  Oncology and Adult Reconstructive Surgery  Dept Orthopaedic Surgery, [facility]  [phone] office, [phone] pager  [email or web address]    Total combined visit time and work time before and after clinic visit on encounter date = 20 min

## 2023-04-24 NOTE — NURSING NOTE
Pre-Op Teaching was done in person at the clinic.    Teaching Flowsheet   Relevant Diagnosis: Pre-Op Teaching  Teaching Topic: Revision L BELLA Pre-Op     Person(s) involved in teaching:   Patient     Motivation Level:  Asks Questions: Yes  Eager to Learn: Yes  Cooperative: Yes  Receptive (willing/able to accept information): Yes  Any cultural factors/Sabianist beliefs that may influence understanding or compliance? No     Patient demonstrates understanding of the following:  Reason for the appointment, diagnosis and treatment plan: Yes  Knowledge of proper use of medications and conditions for which they are ordered (with special attention to potential side effects or drug interactions): Yes  Which situations necessitate calling provider and whom to contact: Yes- discussed the stoplight tool to help assist with this.      Teaching Concerns Addressed:      Proper use of surgical scrub explain: Yes    Nutritional needs and diet plan: Yes  Pain management techniques: Yes  Wound Care: Yes  How and/when to access community resources: Yes     Instructional Materials Used/Given:  2 bottle of chlorhexidine, a surgery packet and a joint booklet given to patient in clinic     - Important contact info/ phone numbers: emphasizing clinic number and after hours number  - Map/ location of surgery  - Medications to avoid  - Showering instructions  - Stop light tool  - Your Guide to Joint Replacement Booklet   - Antibiotic post op before every dentist appointment, procedure, or surgery for the rest of their life.     Additionally the following was discussed with patient:  - At rehab at [facility] and will be transferred from there.   - Online joint replacement call and the use of [employer] to send educational messages. Asked patient to sign up for join class during visit and patient declined to sign up at this time and stated will sign up later. Sheet with sign up instruction given to patient.   - Told patient to check in with insurance  to check about coverage.      -Next step: Schedule a surgery date and pre-op to bed done at TCU.     Time spent with patient: 15 minutes.

## 2023-04-25 NOTE — PLAN OF CARE
Goal Outcome Evaluation:       Patient came back from Ortho appointment at 1630 via wheelchair. Settled into the room. Ordered food from outside and with good appetite. Refused for Hygiene care and repositioning.Sleep in between cares.    Blood pressure (!) 148/67, pulse 73, temperature 96.8  F (36  C), temperature source Oral, resp. rate 16, weight 118 kg (260 lb 1.6 oz), SpO2 90 %.

## 2023-04-25 NOTE — PLAN OF CARE
Goal Outcome Evaluation:      Plan of Care Reviewed With: patient    Overall Patient Progress: declining    Outcome Evaluation: Patient with poor intakes/appetite. Eating 1 meal daily, meeting <50% of needs. Encouraged snacks between meals/more small frequent meals.

## 2023-04-25 NOTE — PROGRESS NOTES
"Met with patient along with Chyna (patient care supervisor) to discuss discharge planning and any concerns or needs while on TCU. Patient reported feeling down and having \"a bad day\" because he was told he won't have surgery for 6 weeks and won't be able to bear weight  for another 3 months.  Patient frustrated by not having therapy.  Discussed rec therapy, pet visits, care plan to get up daily and get outside now that the weather is nicer- patient reports participating in rec therapy and pet visits but not wanting to participate in care plan to increase getting up in the chair/outside.  Patient also frustrated by having different caregivers but declined having a written careplan to give to staff with specific concerns (e.g. heating pad, care with moving leg) reporting \"they won't read it\".  Patient frustrated by needing to go to ortho appointment \"that could have been done over the phone\".    "

## 2023-04-25 NOTE — PLAN OF CARE
Goal Outcome Evaluation:      Plan of Care Reviewed With: patient    Overall Patient Progress: no change    Pt has no c/o pain or discomfort this shift. Aleady sleeping during first round. Had previously refused offer of assistance with repositioning.  Using urinal in bed, staff empties. Has not been using his CPAP when sleeping. Pt has no c/o SOB and no s/s of respiratory issue noted. Note left for provider re: Ortho note on TTWB vs NWB and Therapy order for upper body strengthening.  Appear to be sleeping/resting between cares/ meds. Continue with plan of care.    Patient's most recent vital signs are:     Vital signs:  BP: 148/67  Temp: 96.8  HR: 73  RR: 16  SpO2: 90 %     Patient does not have new respiratory symptoms.  Patient does not have new sore throat.  Patient does not have a fever greater than 99.5.

## 2023-04-25 NOTE — CARE PLAN
RN: PA has call out to ortho to clarify weight bearing status LLE.  Pt has been NWB. Pt states neck pain 8/10 and received ultran and ES tylenol prn at 0930.  Continues to decline full body skin check this shift and turn and reposition, stays supine.  Eats 2 meals/day per usual. Urine mecca color.   Had robaxin later afternoon for neck pain.  PRN meds effective pain control.   PA contacted ortho, pt continues NWB  LLE.    Patient's most recent vital signs are:     Vital signs:  BP: 141/74  Temp: 95.6  HR: 74  RR: 16  SpO2: 96 %     Patient does not have new respiratory symptoms.  Patient does not have new sore throat.  Patient does not have a fever greater than 99.5.

## 2023-04-26 NOTE — PLAN OF CARE
Patient is alert and oriented x4. Able to make needs known to staff. Patient is continent of both bowel and bladder. Urinal within reach which staff empties and bed pan provided upon request. Patient denied chest pain, SOB, cough, and N&V. Pain managed with PRN tramadol and acetaminophen x1. Call-light within reack. Will continue with POC.    Patient's most recent vital signs are:     Vital signs:  BP: 133/75  Temp: 96.8  HR: 67  RR: 16  SpO2: 96 %     Patient does not have new respiratory symptoms.  Patient does not have new sore throat.  Patient does not have a fever greater than 99.5.

## 2023-04-26 NOTE — PROGRESS NOTES
MDS Pain Assessment    The following is the pain interview as conducted by the TCU RN caring for the patient on April / 26 / 2023. This assessment is required by the [facility] for all patients in [place] SNF (Skilled Nursing Facilities).       1. Have you had pain or hurting at any time in the last 5 days? Yes    2. How much of the time have you experienced pain or hurting over the last 5 days? almost constantly    3. Over the past 5 days, has pain made it hard for you to sleep at night? No    4. Over the past 5 days, have you limited your day-to-day activities because of pain? No    5. Pain intensity (Numeric scale used first-if patient unable to answer, verbal scale to be used.)    Numeric Scale: Please rate your worst pain over the last 5 days on a zero to ten scale, with zero being no pain and ten being the worst pain you can imagine.     7    Verbal Scale: USED ONLY if unable to give numeric, otherwise N/A  Please rate the intensity of your worst pain over the last 5 days.     very severe

## 2023-04-26 NOTE — PROGRESS NOTES
Pt A & O X 4. Stayed in bed and needs assist of 2 to T & R with the ceiling lift. Able to use urinal at bedside. Able to make needs known. Has not c/o pain or discomfort at this time. Nursing is continuing to monitor    Margot Sequeira RN

## 2023-04-26 NOTE — PROGRESS NOTES
ZEB left a vm for  Mcgarry pt's brother to ask if there have been updates on vans and MA?  [phone]. [facility] has not communicated with ZEB on where the process is at currently.     ZEB met with pt.  Pt was depressed about not getting surgery right away. SW was trying to get pt to see that it's a good thing pt is going to get surgery. Pt wouldn't see it that way.  We talked about ways to help pass the time and lift pt's spirits.  Pt has many reasons why those things can't happen.   When doing the PHQ pt thought he would be better off dead but would not hurt himself.  SW tried to talk about health psych therapy.  PT adamantly refused. ZEB told doctor during rounds.     NEREYDA Sharp   [facility], Transitional Care Unit   Social Work   [facility] [facility], [address]th [place] [address]  ) [phone]

## 2023-04-26 NOTE — PLAN OF CARE
Patient alert and oriented x 4. Able to make needs known. On regular diet, with thin liquids.  Pt takes medications whole with thin liquids. Pt  transfer with assist of two persons with liko lift. Pt on RA oxygen. Pt WD dsg CDI, was change this morning by night nurse. Prn Tramadol administered for pain rated 8/10 in neck. Pt is continent of B/B, uses urinal, and bed pan at bedside. Pt on BG monitoring, refused breakfast this shift, BG reading 86. RN encouraged pt to have some food, pt prefers to eat at lunch time. Pt did not get out bed this shift. Pt shift weight independently in bed. No c/o chest pain or SOB this shift. Will continue with POC.              Patient's most recent vital signs are:     Vital signs:  BP: 138/72  Temp: 96  HR: 78  RR: 16  SpO2: 94 %     Patient does not have new respiratory symptoms.  Patient does not have new sore throat.  Patient does not have a fever greater than 99.5.

## 2023-04-26 NOTE — PROGRESS NOTES
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 Hospital course c/b toxic encephalopathy, acute hypoxic respiratory failure d/t OHS, acute blood loss anemia, pre-renal HALEY, and profound physical deconditioning. Transferred to TCU on 12/24/22 for rehabilitation. He is no longer progressing with therapies due to both pain and lack of motivation. He is currently awaiting placement at a long term SNF.        # Physical debility:  Due to prolonged immobility in setting of activity restrictions following hip surgery. PT/OT no longer following as patient not progressing with in-bed therapy. Patient intermittently refusing transfers and repositioning.   - SW following for possible placement  - Anticipate patient will remain at TCU until next surgery     # Chronic left hip PJI s/p explantation, debridement, and reconstruction with antibiotic spacer (11/22/22):  Surgical cultures grew Finegoldia and Proteus mirabilis. Completed 6 wk course of oral Cipro and Flagyl on 1/3. Completed 8 wk antibiotic holiday.  S/p attempted L hip aspiration by IR 3/6 but no fluid obtained. S/p biopsy under anesthesia per Dr Meyers 4/7. All culture NGTD. Had ortho follow up 4/24, planning to schedule revision L BELLA in near future.  - Continue NWB LLE.   - Awaiting confirmation of surgical date  - Follow up with Ortho as directed     # Depression:  Depressed mood in setting of ongoing health issues and knowledge of probable 4-5 more months in TCU following surgery. Passive suicidal statements to nursing staff.  Patient denies active suicidal ideation but also states that any plans would be limited being at TCU. No actual plan. Suspect situational depression. Pt seems to be in denial. Does not like the idea of psychology or psychiatry but would prefer not to start additional medications.   - Health psychology consult  - Consider psychiatry consult if any further suicidal ideation, currently not at risk    # Acute on chronic neck pain:  Pain overall stable and adequately controlled. No neurologic symptoms.   - Tylenol 100mg TID PRN  - Robaxin 500mg QID PRN  - Tramadol 25mg q6h PRN  - Heating pad PRN     # DM2, well controlled:  HgbA1C 5.8% on 3/6. PTA glipizide discontinued. Blood sugars well controlled. No hypoglycemia.   - Glucose checks BID  - Metformin XR 2000 mg daily   - Actos 45 mg daily  - Victoza 1.8 mg subcutaneous daily (PTA Trulicity is non formulary)      # HLD:  Continue statin.      # JAIR:  On CPAP at home, frequently refuses at TCU. Monitor.     # Hx DVT/PE:  No acute concerns. Continue PTA apixaban 5 mg BID         Diet: Regular Diet Adult  Snacks/Supplements Adult: Other; Please allow pt/RN to order snacks/supplements PRN; Between Meals    DVT Prophylaxis: DOAC  Tran Catheter: Not present  Lines: None     Cardiac Monitoring: None  Code Status: Full Code      Clinically Significant Risk Factors                         # Moderate Malnutrition: based on nutrition assessment        Disposition Plan         The patient's care was discussed with the Attending Physician, Dr. Trevino, Bedside Nurse and Patient.    Lang Blakely PA-C  Hospitalist Service  [facility] Transitional Care  Securely message with Kelley (more info)  Text page via [employer] Paging/Directory   ______________________________________________________________________    Interval History   Ongoing neck pain, which is relatively stable. No shooting pains or paresthesias in upper ext.     Very frustrated with current situation. Feels  trapped in his bed and concerned that he is getting addicted to his phone. Nursing staff reported that patient made passive suicidal threats. He states that he is just frustrated and denies any symptoms of depression. He states that, realistically, he can't leave to go somewhere to commit suicide so there shouldn't be any concerns. I discussed the many presentaitons of underlying mood disorders, including depression. Given his passive suicidal ideation I discussed that we will either be ordering a psychology or psychiatry consult. He doesn't like the idea of either, but would prefer to talk to someone over possible medication management. He currently denies any suicidal intent.    Physical Exam   Vital Signs: Temp: (!) 96  F (35.6  C) Temp src: Oral BP: 138/72 Pulse: 78   Resp: 16 SpO2: 94 % O2 Device: None (Room air)    Weight: 260 lbs 1.6 oz    General Appearance:  Lying supine in bed, comfortable. Awake. Alert. NAD.   Respiratory:  Normal effort. CTAB.   Cardiovascular:  RRR. S1,S2. No murmurs.   GI:  Soft, ND, NT, +BS.   Extremity:  No pitting edema. No calf tenderness.   Neuro:  Grossly non-focal.      Medical Decision Making       35 MINUTES SPENT BY ME on the date of service doing chart review, history, exam, documentation & further activities per the note.      Data

## 2023-04-26 NOTE — PROGRESS NOTES
ZEB left a vm for  Mcgarry pt's brother to ask if there have been updates on vans and MA?  [phone]. [facility] has not communicated with ZEB on where the process is at currently.     NEREYDA Sharp   [facility], Transitional Care Unit   Social Work   [facility], [address]th [place] [address]  () [phone]

## 2023-04-27 NOTE — CARE PLAN
"Patient offered to get up in wheelchair and go outside; patient declined stating he does not like wheelchair.  Patient declined having shades open stating he is \"unable to see the TV\".  Patient reports he would be willing to get up in recliner later today; nursing aware.  "

## 2023-04-27 NOTE — PLAN OF CARE
Goal Outcome Evaluation:         Pt alert and oriented x4, able to make needs known. Stayed in bed all shift. Had continent BM x1, buttocks excoriated, with some blood from open areas, applied barrier cream after cleaning. Ate 100% dinner. Take pills whole with thin liquids. No PRN given/requested by pt. Will continue with POC.               Patient's most recent vital signs are:     Vital signs:  BP: 133/64  Temp: 97  HR: 77  RR: 18  SpO2: 97 %     Patient does not have new respiratory symptoms.  Patient does not have new sore throat.  Patient does not have a fever greater than 99.5.

## 2023-04-27 NOTE — PLAN OF CARE
Goal Outcome Evaluation:      Plan of Care Reviewed With: patient    Overall Patient Progress: no change    Pt has no c/o pain or discomfort this shift. Has been sleeping since the beginning of the shift. Had previously refused offer of assistance with repositioning despite PUP education- most recent conversation on this was previous night. Staff continuously having this type of conversation with resident on all shift.  Pt claimed he is able and has been shifting weight while in bed.  Using urinal in bed, staff empties. Has not been using his CPAP when sleeping- denies need for O2 supplement. Pt has no c/o SOB and no s/s of respiratory issue noted. Continue with plan of care.    Patient's most recent vital signs are:     Vital signs:  BP: 133/64  Temp: 97  HR: 77  RR: 18  SpO2: 97 %     Patient does not have new respiratory symptoms.  Patient does not have new sore throat.  Patient does not have a fever greater than 99.5.

## 2023-04-27 NOTE — TELEPHONE ENCOUNTER
Patient is scheduled for surgery with Dr. Meyers    Spoke with: Patient    Date of Surgery: 5/26/23    Location: [facility]    Informed patient they will need an adult  : will be coming from TCU    Post op: 4 weeks with MD     Pre op with Provider: Complete    H&P: Will be done in [facility]    Additional imaging/appointments: N/A    Surgery packet: Received in clinic     Additional comments: N/A        Brittanie Trinh MA on 4/27/2023 at 10:17 AM

## 2023-04-27 NOTE — PLAN OF CARE
Pt alert and oriented x4, able to make needs known. C/o pain, requested pain medication twice this shift. Up in recliner at 1330 after education about the need to move positions. Pt is liko lift, NWADRIAN LLE. Pt didn't like the cushion from w/c on the recliner but agreed to use it d/t skin breakdown. Buttocks excoriated and dusky, with some blood from open areas. Poor appetite, no breakfast or lunch eaten. Flat affect.       Patient's most recent vital signs are:     Vital signs:  BP: 119/69  Temp: 95.4  HR: 76  RR: 18  SpO2: 96 %     Patient does not have new respiratory symptoms.  Patient does not have new sore throat.  Patient does not have a fever greater than 99.5.

## 2023-04-28 NOTE — PLAN OF CARE
The patient is alert and oriented x3. VSS. Able to make needs known. Patient c/o discomfort on his back, neck, shoulder and intermittent numbness and tingling right 2nd to 4th fingers from laying on his back. Medicated with Tylenol, Robaxin and Tramadol x 1 for pain control. On scheduled gabapentin for neuropathy. Ongoing refusal of turning and repositioning at any time. Appetite is fair. Continent of bowel and bladder with urinal by the bedside. No acute changes noted this shift. Continue to monitor for comfort.       Patient's most recent vital signs are:     Vital signs:  BP: 117/67  Temp: 96.1  HR: 73  RR: 16  SpO2: 96 %     Patient does not have new respiratory symptoms.  Patient does not have new sore throat.  Patient does not have a fever greater than 99.5.

## 2023-04-28 NOTE — PROGRESS NOTES
Pt  A & O X 4. Able to to make needs known. Ceiling lift with 2 assist. Has not complained of pain at this time. Able to use the urinal independently but needs help with the bed pan. Nursing will continue to monitor    Margot Sequeira RN

## 2023-04-28 NOTE — PROGRESS NOTES
ZEB received an email from aidan/[employer].  The vehicles will be sold next week. He will update ZEB when this has happened.  Pt hasn't wanted to do this.  ZEB reminded him,  he has POA.     NEREYDA Sharp   [facility], Transitional Care Unit   Social Work   [facility] [address], [address] [place] [address]  (ph) [phone]

## 2023-04-28 NOTE — PLAN OF CARE
Goal Outcome Evaluation:  Patient put back on his bed via Lift A2. Offered to have shower, refused. Blanchable redness noted on his buttock, refused for repositioning. Ate dinner with good appetite. Blood sugar 98.PRN medication for pain given.   Call light with in reach.    Blood pressure (!) 148/44, pulse 81, temperature (!) 95.8  F (35.4  C), temperature source Oral, resp. rate 16, weight 118 kg (260 lb 1.6 oz), SpO2 96 %.

## 2023-04-29 NOTE — PROGRESS NOTES
Pt A & O X 4. Able to make needs known. Uses urinal independently. Assist with ceiling lift to use the bedpan for BM and had a large BM. Pleasant but refuses repositioning. No c/o pain or discomfort. Nursing will continue to monitor and provide care as pt allows    Margot Sequeira RN

## 2023-04-29 NOTE — PLAN OF CARE
The patient is alert and oriented x 3. VSS. Able to make needs known. Patient c/o discomfort on his back, neck, shoulder and intermittent numbness and tingling right 2nd to 4th fingers from laying on his back. Medicated with Tylenol, Robaxin and Tramadol x 1 for pain control. On scheduled gabapentin for neuropathy. Ongoing refusal of turning and repositioning at any time. Appetite is fair. Continent of bowel and bladder with urinal by the bedside. No acute changes noted this shift. Continue to monitor for comfort.     Patient's most recent vital signs are:     Vital signs:  BP: 127/71  Temp: 96.5  HR: 75  RR: 16  SpO2: 95 %     Patient does not have new respiratory symptoms.  Patient does not have new sore throat.  Patient does not have a fever greater than 99.5.

## 2023-04-29 NOTE — PLAN OF CARE
Goal Outcome Evaluation:         Pt alert and oriented x4, able tro make needs known, No c/o chest pain, SOB, N/V. Stayed in bed all shift. No PRN requested/Given this shift. Will continue with POC.               Patient's most recent vital signs are:     Vital signs:  BP: 120/52  Temp: 96.2  HR: 71  RR: 16  SpO2: 96 %     Patient does not have new respiratory symptoms.  Patient does not have new sore throat.  Patient does not have a fever greater than 99.5.

## 2023-04-30 NOTE — PLAN OF CARE
Goal Outcome Evaluation:      Plan of Care Reviewed With: patient    Overall Patient Progress: no change    Pt has no c/o pain or discomfort so far this shift. Awake at midnight, watching TV.  Had previously refused offer of assistance with repositioning despite PUP education-Again, encouraged and offered assistance to reposition in bed tonight but pt denied to need help and claimed he is able and has been shifting weight while in bed.  Using urinal in bed, staff empties. Has not been using his CPAP when sleeping- denies need for O2 supplement. Pt has no c/o SOB and no s/s of respiratory issue noted. Appear to be sleeping/resting during rounds. Continue with plan of care.    Patient's most recent vital signs are:     Vital signs:  BP: 127/66  Temp: 95.7  HR: 74  RR: 16  SpO2: 95 %     Patient does not have new respiratory symptoms.  Patient does not have new sore throat.  Patient does not have a fever greater than 99.5.

## 2023-04-30 NOTE — PLAN OF CARE
The patient is alert and oriented x 3. VSS. Able to make needs known. Medicated with Tylenol, Robaxin and Tramadol x 1 for c/o discomfort on his back, neck, shoulder and intermittent numbness and tingling right 2nd to 4th fingers from laying on his back. On  Declined skin assessment and continue to refuse turning and repositioning at any time. Appetite is fair. Continent of bowel and bladder with urinal by the bedside. No acute changes noted this shift. Continue to monitor for comfort.     Patient's most recent vital signs are:     Vital signs:  BP: 139/76  Temp: 96.5  HR: 76  RR: 16  SpO2: 95 %     Patient does not have new respiratory symptoms.  Patient does not have new sore throat.  Patient does not have a fever greater than 99.5.

## 2023-04-30 NOTE — PLAN OF CARE
Goal Outcome Evaluation:         Pt alert and oriented, able to make needs known. No c/o chest pain, SOB, N/V. Stayed in bed all shift, refused repositioning. Good appetite, ate 100% dinner, drinks plenty of fluids. No PRN given this shift, kept urinal within reached. Will continue with POC.               Patient's most recent vital signs are:     Vital signs:  BP: 127/66  Temp: 95.7  HR: 74  RR: 16  SpO2: 95 %     Patient does not have new respiratory symptoms.  Patient does not have new sore throat.  Patient does not have a fever greater than 99.5.

## 2023-05-01 NOTE — PLAN OF CARE
Goal Outcome Evaluation:      Plan of Care Reviewed With: patient    Overall Patient Progress: no change    Pt has no c/o pain or discomfort so far this shift. Awake at midnight, watching TV.  Had previously refused offer of assistance with repositioning despite PUP education-Again, encouraged and offered assistance to reposition in bed tonight but pt denied to need help and claimed he is able and has been shifting weight while in bed. Mood appear to be flat and withdrawn compared previous night. Psychology consult in place for situational depression.Using urinal in bed, staff empties. Has not been using his CPAP when sleeping- denies need for O2 supplement. Pt has no c/o SOB and no s/s of respiratory issue noted. Appear to be sleeping/resting during rounds. Continue with plan of care.  Patient's most recent vital signs are:     Vital signs:  BP: 118/63  Temp: 96.7  HR: 73  RR: 16  SpO2: 97 %     Patient does not have new respiratory symptoms.  Patient does not have new sore throat.  Patient does not have a fever greater than 99.5.

## 2023-05-01 NOTE — PLAN OF CARE
Goal Outcome Evaluation:         Pt alert and oriented x4, able to make needs known. No c/o chest pain, SOB, N/V. Continent of bowel and bladder. Had a medium size BM this shift. Stayed in bed, no PRN given this shift. Ate 100% dinner. Will continue with POC.               Patient's most recent vital signs are:     Vital signs:  BP: 118/63  Temp: 96.7  HR: 73  RR: 16  SpO2: 97 %     Patient does not have new respiratory symptoms.  Patient does not have new sore throat.  Patient does not have a fever greater than 99.5.

## 2023-05-02 NOTE — PLAN OF CARE
Goal Outcome Evaluation:       Overall pt had no acute issue this shift. Alert and oriented x 4. Able to make needs known to staffs. Pt is continent of bladder this shift. Uses a urinal for urine elimination. Pt continue to refused repositioning. Pt denied having chest pain, SOB, N/v, fever and chills. Pt comfortably sleeping at this time. Will continue with POC.       Patient's most recent vital signs are:     Vital signs:  BP: 119/50  Temp: 96  HR: 73  RR: 18  SpO2: 94 %     Patient does not have new respiratory symptoms.  Patient does not have new sore throat.  Patient does not have a fever greater than 99.5.

## 2023-05-02 NOTE — CARE PLAN
Sitting up in recliner today. Liko lift for transfer. Refused to take shower, due to feeling uncomfortable in bariatric shower chair. Appetite varies-fair to good. Has many snack foods in room. Alert and correctly oriented and verbalizes his needs. NWB LLE. BG this morning-84. Flat affect.         Patient's most recent vital signs are:     Vital signs:  BP: 132/68  Temp: 96.4  HR: 78  RR: 16  SpO2: 95 %     Patient does not have new respiratory symptoms.  Patient does not have new sore throat.  Patient does not have a fever greater than 99.5.

## 2023-05-02 NOTE — PLAN OF CARE
Goal Outcome Evaluation:      Plan of Care Reviewed With: patient    Overall Patient Progress: no change    Outcome Evaluation: Patient with continues poor intakes related to poor appetite and dislike of food provided. Encouraged use of outside foods and snacks and emphasized importance of nutrition in healing.  Will continue to monitor intakes.

## 2023-05-02 NOTE — PLAN OF CARE
[id number]  Patient is alert and oriented x4. Able to make needs known to staff. Patient is continent of both bowel and bladder. Urinal within reach which staff empties and bed pan provided upon request. Patient denied chest pain, SOB, cough, and N&V. Pain managed with PRN tramadol and acetaminophen x1. Call-light within reack. Will continue with POC.    Patient's most recent vital signs are:     Vital signs:  BP: 119/50  Temp: 96  HR: 73  RR: 18  SpO2: 94 %     Patient does not have new respiratory symptoms.  Patient does not have new sore throat.  Patient does not have a fever greater than 99.5.

## 2023-05-02 NOTE — PROGRESS NOTES
CLINICAL NUTRITION SERVICES - REASSESSMENT NOTE     Nutrition Prescription    RECOMMENDATIONS FOR MDs/PROVIDERS TO ORDER:  Please obtain new weight as able    Malnutrition Status:    Moderate malnutrition in the context of acute on chronic illness    Recommendations already ordered by Registered Dietitian (RD):  Encouraged intakes    Future/Additional Recommendations:  Monitor labs, intakes, and weight trends.     EVALUATION OF THE PROGRESS TOWARD GOALS   Diet: Regular  Intake/[place]: 100% of documented meals  Pt ordering one meal daily (no meals ordered 4/28 and 4/29) and on average ordering 715 kcal and 27 g protein per day per [employer].     Pt is likely meeting <50% minimum energy and protein needs.     NEW FINDINGS/REVIEW OF SYSTEMS    Nutrition/GI: RD met with pt at bedside. Pt states he doesn't have a large appetite but does get at least one meal in a day. Ordered [employer] the other day and it was too much to get set up and by the end was cold and not appetizing. Pt has multiple snacks from outside in room however says he hasn't eaten them. Encouraged pt to eat the snacks and use Ensures when needed. Explained the importance of protein for healing and given that pt states he has surgery at the end of the month, emphasized the importance of nutritional status pre and post-op.     Weights: Pt with previous 63 lb (19%) weight loss over 1 month. Pt weight stable since admission, no new weight since 4/10.   Wt Readings from Last Encounters:   04/10/23 118 kg (260 lb 1.6 oz)   03/23/23 117 kg (257 lb 14.4 oz)   01/09/23 119.7 kg (264 lb)   12/30/22 118.5 kg (261 lb 3.2 oz)   11/22/22 147.1 kg (324 lb 4.8 oz)   11/14/22 142.9 kg (315 lb)   11/09/22 146.8 kg (323 lb 9.6 oz)   05/19/22 136.1 kg (300 lb)   07/03/18 148.9 kg (328 lb 4.8 oz)      Labs and meds  reviewed    MALNUTRITION  % Intake: < 75% for > 7 days (moderate)  % Weight Loss: None noted over last 4 months  Subcutaneous Fat Loss: Upper arm:  moderate  Muscle Loss: Global moderate   Fluid Accumulation/Edema: Mild  Malnutrition Diagnosis: Moderate malnutrition in the context of acute on chronic illness    Previous Goals  Patient to consume % of nutritionally adequate meal trays TID, or the equivalent with supplements/snacks.  Evaluation: Not met    Previous Nutrition Diagnosis  Inadequate oral intake related to poor appetite, dislike of menu items as evidenced by patient report, pt ordering < 50% of needs daily.     Evaluation: No change    CURRENT NUTRITION DIAGNOSIS  Inadequate oral intake related to poor appetite, dislike of menu items as evidenced by patient report, pt ordering < 50% of needs daily.       INTERVENTIONS  Implementation  Nutrition education for nutrition relationship to health/disease     Goals  Patient to consume % of nutritionally adequate meal trays TID, or the equivalent with supplements/snacks.    Monitoring/Evaluation  Progress toward goals will be monitored and evaluated per protocol.    Dee Alanis MS, RDN, LDN  RD pager: [phone]   Weekend/Holiday Pager: [phone]

## 2023-05-03 NOTE — PLAN OF CARE
Goal Outcome Evaluation:       Pt had no acute issue this shift. Alert and oriented x 4. Able to make needs known to staffs. Denied having chest pain, SOB, N/V, fever and chills. Slept through out the shift. Continue to refused repositioning. No complain at this time. Will continue with POC.       Patient's most recent vital signs are:     Vital signs:  BP: 123/51  Temp: 97.5  HR: 80  RR: 18  SpO2: 97 %     Patient does not have new respiratory symptoms.  Patient does not have new sore throat.  Patient does not have a fever greater than 99.5.

## 2023-05-03 NOTE — PLAN OF CARE
Goal Outcome Evaluation:             Stayed in bed today. Did not want to get up in recliner. States he is very tired and sleeping off and on during the day. Appetite is fair-has many snack foods in room. Refused repositioning and skin assessment of back/buttocks. Continent of bowel and bladder. Uses urinal/bedpan. Verbally expresses his needs. Flat affect.        Patient's most recent vital signs are:     Vital signs:  BP: 127/61  Temp: 95.9  HR: 76  RR: 16  SpO2: 94 %     Patient does not have new respiratory symptoms.  Patient does not have new sore throat.  Patient does not have a fever greater than 99.5.

## 2023-05-03 NOTE — PROGRESS NOTES
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 11/22/22 for scheduled explantation of left hip prosthesis, debridement, and reconstruction with antibiotic cement spacer. Hospital course c/b toxic encephalopathy, acute hypoxic respiratory failure d/t OHS, acute blood loss anemia, pre-renal HALEY, and profound physical deconditioning. Transferred to TCU on 12/24/22 for rehabilitation. He is no longer progressing with therapies due to both pain and lack of motivation. He is currently awaiting placement at a long term SNF.     Physical debility:  Due to prolonged immobility in setting of activity restrictions following hip surgery. PT/OT no longer following as patient not progressing with in-bed therapy. Patient intermittently refusing transfers and repositioning.    - SW following for placement   - Anticipate patient will remain at TCU until next surgery    Chronic left hip PJI s/p explantation, debridement, and reconstruction with antibiotic spacer (11/22/22): Surgical cultures grew Finegoldia and Proteus mirabilis. Completed 6 wk course of oral Cipro and Flagyl on 1/3. Completed 8 wk antibiotic holiday.  S/p attempted L hip aspiration by IR 3/6 but no fluid obtained. S/p biopsy under anesthesia per Dr Meyers 4/7. All culture NGTD. Had ortho follow up 4/24, planning to schedule revision L BELLA in near future.   - NWB to LLE   - Scheduled for L BELLA revision with Dr. Whitt on 5/26    Depression:  Depressed mood in setting of ongoing health issues and knowledge of probable 4-5 more months in TCU following surgery. Passive suicidal statements to nursing staff. Patient denies active suicidal ideation but also  states that any plans would be limited being at TCU. No actual plan. Suspect situational depression. Pt seems to be in denial. Does not like the idea of psychology or psychiatry but would prefer not to start additional medications.    - Health psychology consult   - Consider psychiatry consult if any further suicidal ideation, currently not at risk    Acute on chronic neck pain:  Pain overall stable and adequately controlled. No neurologic symptoms.    - Tylenol 100mg TID PRN   - Robaxin 500mg QID PRN   - Tramadol 25mg q6h PRN   - Heating pad PRN     HALEY, resolved: Cr baseline 0.9-1.1. Creatinine bumped to 1.33 on 1/25 in setting of poor PO intake. Suspect volume depletion. Renal function improved w/ holding diuretics and IV hydration.    - BMP qMTh     DM2: Well-controlled. A1C 5.8 on 3/6. PTA glipizide discontinued. Blood sugars well controlled. No hypoglycemia.    - Glucose checks BID   - Metformin XR 2000 mg daily    - Actos 45 mg daily   - Victoza 1.8 mg subcutaneous daily (PTA Trulicity is non formulary)      HLD:  Continue statin.      JAIR:  On CPAP at home, frequently refuses at TCU. Monitor.     Hx DVT/PE:  No acute concerns. Continue PTA apixaban 5 mg BID       Diet: Regular Diet Adult  Snacks/Supplements Adult: Other; Please allow pt/RN to order snacks/supplements PRN; Between Meals    DVT Prophylaxis: DOAC  Tran Catheter: Not present  Lines: None     Cardiac Monitoring: None  Code Status: Full Code      Clinically Significant Risk Factors                         # Moderate Malnutrition: based on nutrition assessment        Disposition Plan    Awaiting LTFC placement.         Diana Farias PA-C  Hospitalist Service  [facility] Transitional Care  Securely message with Kelley (more info)  Text page via [facility] Paging/Directory   ______________________________________________________________________    Interval History   Patient offers no complaints today. Tolerating regular diet. No chest pain,  dyspnea, fevers. Pain controlled. Eager for surgery.     Physical Exam   Vital Signs: Temp: 97.5  F (36.4  C) Temp src: Axillary BP: 123/51 Pulse: 80   Resp: 18 SpO2: 97 % O2 Device: None (Room air)    Weight: 260 lbs 1.6 oz    Constitutional: Awake and alert, in no apparent distress.   Eyes: Sclera clear, anicteric   Respiratory: Breathing non-labored. CTAB.  Cardiovascular:  RRR, normal S1/S2. No rubs or murmurs. . No peripheral edema.   GI: Soft, non-tender, non-distended. Normoactive bowel sounds.   Skin:  Good color. No jaundice. No visible rashes, lesions, or bruising of concern.   Neurologic: Alert and fully oriented.         Medical Decision Making       30 MINUTES SPENT BY ME on the date of service doing chart review, history, exam, documentation & further activities per the note.      Data   ------------------------- PAST 24 HR DATA REVIEWED -----------------------------------------------        Imaging results reviewed over the past 24 hrs:   No results found for this or any previous visit (from the past 24 hour(s)).

## 2023-05-03 NOTE — PLAN OF CARE
Patient is alert and oriented x4. Able to verbalize needs and concerns to staff. Patient is continent of both bowel and bladder. Urinal within reach at bedside and bed pan provided upon request. Assist-2 with Liko lift for transfers. Sat in the recliner until later this evening. PRN tramadol and acetaminophen x1. Call-light within reach.     Patient's most recent vital signs are:     Vital signs:  BP: 123/51  Temp: 97.5  HR: 80  RR: 18  SpO2: 97 %     Patient does not have new respiratory symptoms.  Patient does not have new sore throat.  Patient does not have a fever greater than 99.5.

## 2023-05-04 NOTE — PLAN OF CARE
Goal Outcome Evaluation:  No acute issues overnight. Has no c/o's pain, discomfort, CP/SOB or any other c/o's. Continent using urinal indep.- staff emptied. LBM=05/02, uses bedpan.        Patient's most recent vital signs are:     Vital signs:  BP: 120/54  Temp: 96.7  HR: 75  RR: 18  SpO2: 95 %     Patient does not have new respiratory symptoms.  Patient does not have new sore throat.  Patient does not have a fever greater than 99.5.

## 2023-05-04 NOTE — PLAN OF CARE
Goal Outcome Evaluation:         Stayed in bed all shift, no c/o chest pain, SOB, N/V. Refused repositioning. Good appetite, ate 100% dinner. Continent of bowel and bladder. Placed urinal within reached. Will continue with POC.                 Patient's most recent vital signs are:     Vital signs:  BP: 120/54  Temp: 96.7  HR: 75  RR: 18  SpO2: 95 %     Patient does not have new respiratory symptoms.  Patient does not have new sore throat.  Patient does not have a fever greater than 99.5.

## 2023-05-04 NOTE — PLAN OF CARE
Goal Outcome Evaluation:       States he was awake until 0500, therefore he has been sleeping most of the day. Refused repositioning or skin check on backs/buttocks. States he is resting comfortably and does not want to move. BG-94 this morning. Uses urinal at bedside-staff empties. Bedpan for bowel movement. Appetite is fair-many snack foods in room.Verbally expresses his needs. Flat affect.        Patient's most recent vital signs are:     Vital signs:  BP: 133/67  Temp: 97.2  HR: 71  RR: 16  SpO2: 98 %     Patient does not have new respiratory symptoms.  Patient does not have new sore throat.  Patient does not have a fever greater than 99.5.

## 2023-05-05 NOTE — PLAN OF CARE
Goal Outcome Evaluation:    [id number]  Pt is A&OX4, calm, & cooperative with care. Denied CP, SOB, & n/v. VSS & on RA. A of 2 with Liko lift; was not OOB this shift. NWB to the LLE. Pt remains refusing repositioning on the bed. Continent for both B&B; uses bedpan for BM. Urinal at the bedside for bladder. Takes med whole with thin liquid. Pt ate dinner with good appetite & no acute issue. Able to make needs known & call light within reach. Will continue with plan of care.    [id number]  Pt slept well throughout the night & no acute issues overnight. Kept refusing reposition. Denied CP, SOB, & n/v. Urinal @ the bedside. Able to make needs known & call light within reach. Will continue with POC.      Patient's most recent vital signs are:     Vital signs:  BP: 133/67  Temp: 97.2  HR: 71  RR: 16  SpO2: 98 %     Patient does not have new respiratory symptoms.  Patient does not have new sore throat.  Patient does not have a fever greater than 99.5.

## 2023-05-05 NOTE — PROGRESS NOTES
SW emailed nephew/Luke to see if the vehicles were sold this past week. That should be the last alejandra to get pt on MA.     NEREYDA Sharp   [facility], Transitional Care Unit   Social Work   [address] [place] [address], [address] Floor  [place], [place] [address] (ph) [phone]

## 2023-05-05 NOTE — CARE PLAN
RN: Declines OOB and turn and reposition. Also declines skin check. Denies pain this shift.    Patient's most recent vital signs are:     Vital signs:  BP: 124/73  Temp: 95.7  HR: 73  RR: 16  SpO2: 96 %     Patient does not have new respiratory symptoms.  Patient does not have new sore throat.  Patient does not have a fever greater than 99.5.

## 2023-05-06 NOTE — PROGRESS NOTES
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 Hospital course c/b toxic encephalopathy, acute hypoxic respiratory failure d/t OHS, acute blood loss anemia, pre-renal HALEY, and profound physical deconditioning. Transferred to TCU on 12/24/22 for rehabilitation. He is no longer progressing with therapies due to both pain and lack of motivation. He is currently awaiting placement at a long term SNF.     Physical debility:  Due to prolonged immobility in setting of activity restrictions following hip surgery. PT/OT no longer following as patient not progressing with in-bed therapy. Patient intermittently refusing transfers and repositioning.    - SW following for placement   - Anticipate patient will remain at TCU until next surgery     Chronic left hip PJI s/p explantation, debridement, and reconstruction with antibiotic spacer (11/22/22): Surgical cultures grew Finegoldia and Proteus mirabilis. Completed 6 wk course of oral Cipro and Flagyl on 1/3. Completed 8 wk antibiotic holiday.  S/p attempted L hip aspiration by IR 3/6 but no fluid obtained. S/p biopsy under anesthesia per Dr Meyers 4/7. All culture NGTD. Had ortho follow up 4/24, planning to schedule revision L BELLA in near future.   - NWB to LLE   - Scheduled for L BELLA revision with Dr. Whitt on 5/26     Depression:  Depressed mood in setting of ongoing health issues and knowledge of probable 4-5 more months in TCU following surgery. Passive suicidal statements to nursing staff. Patient denies active suicidal ideation but  also states that any plans would be limited being at TCU. No actual plan. Suspect situational depression. Pt seems to be in denial. Does not like the idea of psychology or psychiatry but would prefer not to start additional medications.    - Health psychology consult   - Consider psychiatry consult if any further suicidal ideation, currently not at risk     Acute on chronic neck pain:  Pain overall stable and adequately controlled. No neurologic symptoms.    - Tylenol 100mg TID PRN   - Robaxin 500mg QID PRN   - Tramadol 25mg q6h PRN   - Heating pad PRN     HALEY, resolved: Cr baseline 0.9-1.1. Creatinine bumped to 1.33 on 1/25 in setting of poor PO intake. Suspect volume depletion. Renal function improved w/ holding diuretics and IV hydration.    - BMP weekly     DM2: Well-controlled. A1C 5.8 on 3/6. PTA glipizide discontinued. Blood sugars well controlled. No hypoglycemia.    - Glucose checks BID   - Metformin XR 2000 mg daily    - Actos 45 mg daily   - Victoza 1.8 mg subcutaneous daily (PTA Trulicity is non formulary)      HLD:  Continue statin.      JAIR:  On CPAP at home, frequently refuses at TCU. Monitor.     Hx DVT/PE:  No acute concerns. Continue PTA apixaban 5 mg BID       Diet: Regular Diet Adult  Snacks/Supplements Adult: Other; Please allow pt/RN to order snacks/supplements PRN; Between Meals    DVT Prophylaxis: DOAC  Tran Catheter: Not present  Lines: None     Cardiac Monitoring: None  Code Status: Full Code    Psychoactive medications: Tramadol at lowest effective dose for neck pain  Disposition: Completed PT/OT, awaiting long term care placement. Surgery on 5/26.       Diana Farias PA-C  Hospitalist Service  [facility]  Securely message with Kelley (more info)  Text page via [facility] Paging/Directory   ______________________________________________________________________    Interval History   Patient offers no complaints today. Sleeping well. Eager to have surgery on 5/26.  Denies any suicidal ideation.    Physical Exam   Vital Signs: Temp: (!) 96.3  F (35.7  C) Temp src: Oral BP: (!) 147/65 Pulse: 78   Resp: 16 SpO2: 95 % O2 Device: None (Room air)    Weight: 260 lbs 1.6 oz    Constitutional: Awake and alert, in no apparent distress.   Eyes: Sclera clear, anicteric   Respiratory: Breathing non-labored. CTAB.  Cardiovascular:  RRR, normal S1/S2. No rubs or murmurs. . No peripheral edema.   GI: Soft, non-tender, non-distended. Normoactive bowel sounds.   Skin:  Good color. No jaundice. No visible rashes, lesions, or bruising of concern.   Neurologic: Alert and fully oriented.     Medical Decision Making       30 MINUTES SPENT BY ME on the date of service doing chart review, history, exam, documentation & further activities per the note.      Data   ------------------------- PAST 24 HR DATA REVIEWED -----------------------------------------------        Imaging results reviewed over the past 24 hrs:   No results found for this or any previous visit (from the past 24 hour(s)).

## 2023-05-06 NOTE — PLAN OF CARE
Goal Outcome Evaluation:    Pt is A&OX4, calm, & cooperative with care. Denied CP, SOB, & n/v. A of 2 with Liko lift; was not OOB this shift. NWB to the LLE. Pt remains refusing repositioning on the bed. Continent for both B&B; uses bedpan for BM. Urinal at the bedside for bladder. Takes med whole with thin liquid. Pt slept well for most of the shift. Able to make needs known & call light within reach. Will continue with plan of care.    Patient's most recent vital signs are:     Vital signs:  BP: 147/65  Temp: 96.3  HR: 78  RR: 16  SpO2: 95 %     Patient does not have new respiratory symptoms.  Patient does not have new sore throat.  Patient does not have a fever greater than 99.5.

## 2023-05-06 NOTE — PLAN OF CARE
Patient is alert and oriented x4.Verbalizes needs and concerns. Continent of both bowel and bladder. Urinal within reach. Assist-2 with liko lift. Call-light within reach. Continue with POC.    Patient's most recent vital signs are:     Vital signs:  BP: 142/72  Temp: 96.2  HR: 78  RR: 18  SpO2: 94 %     Patient does not have new respiratory symptoms.  Patient does not have new sore throat.  Patient does not have a fever greater than 99.5.

## 2023-05-06 NOTE — CARE PLAN
RN: Using prn ultram and tylenol for neck and left hip pain and effective. Continues to refuse skin check, tu    Patient's most recent vital signs are:     Vital signs:  BP: 142/72  Temp: 96.2  HR: 78  RR: 18  SpO2: 94 %     Patient does not have new respiratory symptoms.  Patient does not have new sore throat.  Patient does not have a fever greater than 99.5.       rn and reposition. Did not order meals today, has snacks at bedside.

## 2023-05-06 NOTE — PLAN OF CARE
Goal Outcome Evaluation:  Patient declines for OOB,repositioning and skin check. Stay on his back. Offered for his PRN pain medication, refused.Call light with in reach. Continue with current plan of care.    .Blood pressure (!) 147/65, pulse 78, temperature (!) 96.3  F (35.7  C), temperature source Oral, resp. rate 16, weight 118 kg (260 lb 1.6 oz), SpO2 95 %.

## 2023-05-07 NOTE — CARE PLAN
RN: Denies pain, no prn given this morning.  Sleeping, awakened via name call. No breakfast meal odered. Declines skin check, turn and reposition.   Declined lunch meal also today.  Pt states he is awake more at night time, and sleeps during day time.     Patient's most recent vital signs are:     Vital signs:  BP: 132/64  Temp: 96.2  HR: 79  RR: 16  SpO2: 97 %     Patient does not have new respiratory symptoms.  Patient does not have new sore throat.  Patient does not have a fever greater than 99.5.

## 2023-05-07 NOTE — PLAN OF CARE
Patient is alert and oriented x4. Patient is continent of both bowel and bladder. Urinal within reach at bedside. Assist-2 with transfers with liko lift.Pain managed with PRN acetaminophen and tramadol x1. BG at bed time was 120. Call-light within reach. Will continue with POC.  Patient's most recent vital signs are:     Vital signs:  BP: 140/65  Temp: 96.1  HR: 88  RR: 18  SpO2: 96 %     Patient does not have new respiratory symptoms.  Patient does not have new sore throat.  Patient does not have a fever greater than 99.5.

## 2023-05-07 NOTE — PLAN OF CARE
FOCUS/GOAL  NURSING NOTE FOCUS/GOAL:[id number]}    Pt slept well during the overnight, denies pain or shortness of breath. Using call light and able to communicate needs and wants. Continent of bladder and using urinal at bedside. Pt is now resting comfortably in bed.

## 2023-05-08 NOTE — PLAN OF CARE
Goal Outcome Evaluation:    No acute issue during shift. Alert and oriented x4. No reported sob and cp. Able to make needs known. Continent both bowel and bladder, urinal at bedside. A-2 with liko lift for transfers. No report of any pain or discomfort. No PRN medications given. Stays awake most of the time overnight watching TV. Call light within reach. Continue with the plan of care.      Patient's most recent vital signs are:     Vital signs:  BP: 140/65  Temp: 96.1  HR: 88  RR: 18  SpO2: 96 %     Patient does not have new respiratory symptoms.  Patient does not have new sore throat.  Patient does not have a fever greater than 99.5.

## 2023-05-08 NOTE — PLAN OF CARE
Goal Outcome Evaluation:         Pt alert and oriented X4. Able to make needs known. Denied SOB, CP, N/V. Refused to allow rn to assess buttock. PRN tramadol X1. Takes pills whole. Requesting to get therapy before procedure at the end of the month to increase upper extremity strength. Sticky note left for provider .        Patient's most recent vital signs are:     Vital signs:  BP: 129/66  Temp: 95.8  HR: 73  RR: 16  SpO2: 94 %     Patient does not have new respiratory symptoms.  Patient does not have new sore throat.  Patient does not have a fever greater than 99.5.

## 2023-05-09 NOTE — PROGRESS NOTES
"Brief Medicine Note    Contacted by OT regarding patient notes that he is not feeling well.    Met with patient and he again reiterates his comment to therapy regarding \"tell therapy to kiss my ass\" and notes that he is being offered to get out of bed to his chair but has been politely declining and notes that it seems excessive that he is being documented as \"refused\".    He notes that he continues to wish to do therapy and has been using his exercise bands as well as the various handles in his bed to move himself around within the bed.  He does not endorse any new medical concerns.     Today's vital signs, medications, and nursing notes were reviewed. Labs reviewed recent laboratories     /73 (BP Location: Right arm, Cuff Size: Adult Regular)   Pulse 75   Temp (!) 95.4  F (35.2  C) (Oral)   Resp 16   Wt 118 kg (260 lb 1.6 oz)   SpO2 95%   BMI 37.32 kg/m    General: A&O. NAD.  Laying supine in bed and able to reposition independently  Chest: Nonlabored breathing  Skin: Noncyanotic, anicteric    A/P:  Physical debility  Chronic left hip PJI s/p explantation, debridement, and reconstruction with antibiotic spacer (11/22/22):  Due to prolonged immobility in setting of activity restrictions following hip surgery. PT/OT no longer following as patient not progressing with in-bed therapy. Patient intermittently refusing transfers and repositioning.   -We will need to demonstrate willingness to get out of bed to chair as part of therapy before progressing further with PT/OT   - SW following for placement   - Anticipate patient will remain at TCU until next surgery   - NWB to LLE   - Scheduled for L BELLA revision with Dr. Whitt on 5/26    Chronic neck pain:  Pain overall stable and adequately controlled. No neurologic symptoms.    - Tylenol 100mg TID PRN   - Robaxin 500mg QID PRN   - Tramadol 25mg q6h PRN   - Heating pad PRN    Muriel Mcmahon PA-C  [facility]  Contact information " available via [facility] Paging/Directory

## 2023-05-09 NOTE — PLAN OF CARE
Goal Outcome Evaluation:    Alert and oriented x4. No reported sob and cp. Able to make needs known. NWB status on LLE maintained. A-2 liko lift for transfers. Continent both bladder and bowel. Patient slept intermittently overnight. No acute issue during shift. Appears comfortable on bed during safety rounds. Call light within reach. Continue with the plan of care.      Patient's most recent vital signs are:     Vital signs:  BP: 125/55  Temp: 95.7  HR: 76  RR: 16  SpO2: 95 %     Patient does not have new respiratory symptoms.  Patient does not have new sore throat.  Patient does not have a fever greater than 99.5.

## 2023-05-09 NOTE — PROGRESS NOTES
"Rounded with patient from a utilization review/insurance perspective and Chyna (patient care supervisor).  Per rounds, patient would like to work with therapy again.  Per team, patient continues to decline getting out of bed.  Discussed this with patient and encouraged patient to get out of bed at least once per day even for an hour (one meal) as the first step to increasing activity and demonstrating appropriateness for therapy to re-evaluate.  Patient responded \"tell therapy to kiss my ass\".  Patient educated that getting up and sitting needs to happen prior to being able to work on transfers as this is what he reported would be his therapy goal. When asked, patient reported he typically gets in the chair twice per week however nursing reports he is typically declining getting up at all.      "

## 2023-05-09 NOTE — PLAN OF CARE
Goal Outcome Evaluation:      Plan of Care Reviewed With: patient    Overall Patient Progress: no change    Outcome Evaluation: Patient continues with suboptimal intakes related to lack of appetite and desire to eat. Encouraged oral intakes and use of outside foods and supplements. Will continue to monitor intakes.

## 2023-05-09 NOTE — CARE PLAN
"RN: Requesting PT services for transferring and ambulating with NWB LLE, also wants to work on using BSC. \"I have been here for 5 months, my surgery is coming up on the 26th and I will be taking another step backwards\". Pt has been declining to work with therapies in the past, declines up in chair with staff consistently, is currently lift transfer. Declines back, caridad, buttock skin check, and declines turn side to side. Electronic sticky note left by attending RN yesterday updating them on this.   Sleeping during this shift, states is more awake at night time. Awakens easily with name call. Declines breakfast today.    Robaxin given prn for pain control and effective along with ultram and tylenol for LBP and neck pain.     Patient's most recent vital signs are:     Vital signs:  BP: 121/73  Temp: 95.4  HR: 75  RR: 16  SpO2: 95 %     Patient does not have new respiratory symptoms.  Patient does not have new sore throat.  Patient does not have a fever greater than 99.5.         "

## 2023-05-09 NOTE — PROGRESS NOTES
"CLINICAL NUTRITION SERVICES - REASSESSMENT NOTE     Nutrition Prescription    RECOMMENDATIONS FOR MDs/PROVIDERS TO ORDER:  Please obtain new weight as able    Malnutrition Status:    Moderate malnutrition in the context of acute on chronic illness    Recommendations already ordered by Registered Dietitian (RD):  Encouraged intakes and use of outside food    Future/Additional Recommendations:  Monitor labs, intakes, and weight trends.     EVALUATION OF THE PROGRESS TOWARD GOALS   Diet: Regular  Intake/[place]: 100% of documented meals, poorly documented    Pt ordering one meal daily (no meals ordered 4/7) and on average ordering 836 kcal and 31 g protein per day per [employer].     Pt is likely meeting <50% minimum energy and protein needs.     NEW FINDINGS/REVIEW OF SYSTEMS    Nutrition/GI: RD met with pt at bedside. Pt states he normally eats ~ 2 meals per day. Still with large stock of ensure and snacks from outside in room all unopened. Encouraged pt to eat these snacks, pt replied \"no,\" but offered them to writer. Reinforced the importance of protein and calories for muscle maintenance and positive surgical outcomes. Pt replied \"well so is PT.\"  Offered nutrition interventions but pt declined at this time. Pt states someone weighed him the other day and he was 248 lb. Explained that pt should not be losing more weight and should be eating more to maintain muscle mass.     Weights: Pt with previous 63 lb (19%) weight loss over 1 month. Pt weight stable since admission, no new weight since 4/10.   Wt Readings from Last Encounters:   04/10/23 118 kg (260 lb 1.6 oz)   03/23/23 117 kg (257 lb 14.4 oz)   01/09/23 119.7 kg (264 lb)   12/30/22 118.5 kg (261 lb 3.2 oz)   11/22/22 147.1 kg (324 lb 4.8 oz)   11/14/22 142.9 kg (315 lb)   11/09/22 146.8 kg (323 lb 9.6 oz)   05/19/22 136.1 kg (300 lb)   07/03/18 148.9 kg (328 lb 4.8 oz)      Labs and meds reviewed    MALNUTRITION  % Intake: < 75% for > 7 days (moderate)  % " Weight Loss: None noted over last 4 months  Subcutaneous Fat Loss: Upper arm: moderate  Muscle Loss: Global moderate   Fluid Accumulation/Edema: Mild  Malnutrition Diagnosis: Moderate malnutrition in the context of acute on chronic illness    Previous Goals  Patient to consume % of nutritionally adequate meal trays TID, or the equivalent with supplements/snacks.  Evaluation: Not met    Previous Nutrition Diagnosis  Inadequate oral intake related to poor appetite, dislike of menu items as evidenced by patient report, pt ordering < 50% of needs daily.     Evaluation: No change     CURRENT NUTRITION DIAGNOSIS  Inadequate oral intake related to poor appetite, dislike of menu items as evidenced by patient report, pt ordering < 50% of needs daily.       INTERVENTIONS  Implementation  Nutrition education for nutrition relationship to health/disease    Goals  Patient to consume % of nutritionally adequate meal trays TID, or the equivalent with supplements/snacks.    Monitoring/Evaluation  Progress toward goals will be monitored and evaluated per protocol.    Dee Alanis MS, RDN, LDN  RD pager: [phone]   Weekend/Holiday Pager: [phone]

## 2023-05-09 NOTE — PLAN OF CARE
Goal Outcome Evaluation:       Pt A&O x4. Able to make needs known to staff.  Takes pills whole with thin liquids. Regular diet, ate with good appetite. Continent of B&B, uses urinal at bedside. BG 89. NWB to LLE. Liko lift to transfer. Refused repositioning and miconazole powder this evening. Continue POC.         Patient's most recent vital signs are:     Vital signs:  BP: 125/55  Temp: 95.7  HR: 76  RR: 16  SpO2: 95 %     Patient does not have new respiratory symptoms.  Patient does not have new sore throat.  Patient does not have a fever greater than 99.5.

## 2023-05-10 NOTE — PROGRESS NOTES
ZEB again emailed nephew/Luke to see if the vehicles were sold. He has not gotten back to me yet on where in the process we are. ZEB added the  on the email.     NEREYDA Sharp   [facility]   Social Work   [address] [place] [address], [address] [place] [address]  (PH) [phone]

## 2023-05-10 NOTE — PLAN OF CARE
"Goal Outcome Evaluation:    Pt is A&OX4, calm, & cooperative with care. Denied CP, SOB, & n/v. A of 2 with Liko lift for transfer. Spent most of the shift on a recliner. NWB to the E. Pt stated that \"want to have therapy sessions before the procedure on 05/26/23 asap. Continent for both B&B; uses bedpan for BM. LBM this shift. Urinal at the bedside for bladder. Takes med whole with thin liquid. Pt ate dinner with good appetite & no acute issue. Able to make needs known & call light within reach. Will continue with plan of care.    Patient's most recent vital signs are:     Vital signs:  BP: 136/64  Temp: 95.5  HR: 82  RR: 16  SpO2: 95 %     Patient does not have new respiratory symptoms.  Patient does not have new sore throat.  Patient does not have a fever greater than 99.5.         "

## 2023-05-10 NOTE — CARE PLAN
RN: Using ultram and tylenol prn this morning for neck and left hip pain 9/10 and sleeping within an hour after. Pt sleeping and expected to sleep most of this shift due to being awake most of the night. Pt states he fell asleep at 0530. States usual routine is to be awake at night and sleep during the day.   Had robaxin shift end for neck and left hip pain. Slept all shift and woke up at meka 1400. Declines skin check buttock, caridad/back skin check. Declines turn and reposition.     Patient's most recent vital signs are:     Vital signs:  BP: 110/54  Temp: 96.6  HR: 78  RR: 16  SpO2: 94 %     Patient does not have new respiratory symptoms.  Patient does not have new sore throat.  Patient does not have a fever greater than 99.5.

## 2023-05-10 NOTE — PLAN OF CARE
Goal Outcome Evaluation:  No acute issues overnight. Awake and on phone or watching TV most of shift. Has no c/o's or requests. Denied need for PRN pain med. Continent using urinal indep.- staff empties. LBM yesterday, uses bedpan. Approx.0430 pt.stated he was going to sleep now, usually awake until 0300/0400 per pt.        Patient's most recent vital signs are:     Vital signs:  BP: 136/64  Temp: 95.5  HR: 82  RR: 16  SpO2: 95 %     Patient does not have new respiratory symptoms.  Patient does not have new sore throat.  Patient does not have a fever greater than 99.5.

## 2023-05-11 NOTE — PLAN OF CARE
Pt is A&Ox4. He requested pain medication once this shift. Pt did not sleep last night, slept most of the day, awakened for meds. Pt is continent of B&B, uses urinal that staff empties. Pt did not eat any meals this shift. He is NWB on LLE. Pt did not get up to recliner today as he was sleeping.         Patient's most recent vital signs are:     Vital signs:  BP: 114/66  Temp: 96.8  HR: 82  RR: 18  SpO2: 92 %     Patient does not have new respiratory symptoms.  Patient does not have new sore throat.  Patient does not have a fever greater than 99.5.

## 2023-05-11 NOTE — PROGRESS NOTES
Pt alert and oriented X4. Able to make needs known. Denied SOB, CP, N/V. Pt was up in recliner from 6 pm to 10 pm. PRN tylenol, tramadol, and roboxin X1 on shift.         Patient's most recent vital signs are:     Vital signs:  BP: 114/62  Temp: 96.2  HR: 90  RR: 16  SpO2: 96 %     Patient does not have new respiratory symptoms.  Patient does not have new sore throat.  Patient does not have a fever greater than 99.5.

## 2023-05-11 NOTE — PLAN OF CARE
Goal Outcome Evaluation:  No acute issues overnight. Has been awake as of 0300 - talks on phone, watches TV or plays on phone. Has no c/o's pain, discomfort, CP/SOB or any other c/o's during shift. Continent using urinal indep.- staff emptied.         Patient's most recent vital signs are:     Vital signs:  BP: 114/62  Temp: 96.2  HR: 90  RR: 16  SpO2: 96 %     Patient does not have new respiratory symptoms.  Patient does not have new sore throat.  Patient does not have a fever greater than 99.5.

## 2023-05-12 NOTE — PROGRESS NOTES
[facility]    Medicine Progress Note - Hospitalist Service    Date of Admission:  4/7/2023    - request ophthamology follow up with [facility]/ [facility] [place]- Dr. 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 Hospital course c/b toxic encephalopathy, acute hypoxic respiratory failure d/t OHS, acute blood loss anemia, pre-renal HALEY, and profound physical deconditioning. Transferred to TCU on 12/24/22 for rehabilitation. He is no longer progressing with therapies due to both pain and lack of motivation. He is currently awaiting placement at a long term SNF.    Physical debility  Chronic left hip PJI s/p explantation, debridement, and reconstruction with antibiotic spacer (11/22/22):  Due to prolonged immobility in setting of activity restrictions following hip surgery. PT/OT no longer following as patient not progressing with in-bed therapy. Patient intermittently refusing transfers and repositioning.   -We will need to demonstrate willingness to get out of bed to chair as part of therapy before progressing further with PT/OT   - SW following for placement   - Anticipate patient will remain at TCU until next surgery   - NWB to LLE   - Scheduled for L BELLA revision with Dr. Whitt on 5/26     Chronic neck pain:  Pain overall stable and adequately controlled. No neurologic symptoms. Intact strength in neck.    - Tylenol 100mg TID PRN   - Robaxin 500mg QID PRN   - Tramadol 25mg q6h PRN   - Heating pad PRN    Depression:  Depressed mood in setting of ongoing health issues  and knowledge of probable 4-5 more months in TCU following surgery. Passive suicidal statements to nursing staff. Patient denies active suicidal ideation but also states that any plans would be limited being at TCU. No actual plan. Suspect situational depression. Pt seems to be in denial. Does not like the idea of psychology or psychiatry but would prefer not to start additional medications.    - Health psychology consulted but not available in TCU   - Consider psychiatry consult if any further suicidal ideation, currently not at risk     Moisture associated skin damage: Dependent maceration to left buttock and upper thigh noted on prior WOCN assessments   - declines further assessment at this time   - monitor    DM2: Well-controlled. A1C 5.8 on 3/6. PTA glipizide discontinued. Blood sugars well controlled 80-110s. No hypoglycemia.    - Glucose checks BID   - Metformin XR 2000 mg daily    - Actos 45 mg daily   - Victoza 1.8 mg subcutaneous daily (PTA Trulicity is non formulary)      HLD:  Continue statin.      JAIR:  On CPAP at home, frequently refuses at TCU. Monitor.     Hx DVT/PE:  No acute concerns. Continue PTA apixaban 5 mg BID    HALEY, resolved: Cr baseline 0.9-1.1. Creatinine bumped to 1.33 on 1/25 in setting of poor PO intake. Suspect volume depletion. Renal function improved w/ holding diuretics and IV hydration.    - BMP weekly (last on 5/8)    Moderate malnutrition  Sarcopenia  Obesity (BMI 37)  - per RD assessment BMI decreasing in the setting of sarcopenia       Diet: Regular Diet Adult  Snacks/Supplements Adult: Other; Please allow pt/RN to order snacks/supplements PRN; Between Meals    DVT Prophylaxis: DOAC  Tran Catheter: Not present  Lines: None     Cardiac Monitoring: None  Code Status: Full Code        Clinically Significant Risk Factors                          # Moderate Malnutrition: based on nutrition assessment         Disposition Plan     suspect will remain to next surgery 5/26 and then  "further rehabilitation thereafter    The patient's care was discussed with the Bedside Nurse and Patient.    Muriel Mcmahon PA-C  Hospitalist Service  [facility]  Securely message with Kelley (more info)  Text page via [employer] Paging/Directory   ______________________________________________________________________    Interval History    Rahat is out of bed to the chair today and notes that he is still having stable chronic neck pain and limited ROM with minimal ability to turn his head bilaterally gabino to the left and the pain is in the \"muscle or tendon\" on the left side and point raine-laterally and to the left clavicle.     He notes previously following closely with his eye surgeon Dr. Carlos Enrique Winter who is now of [facility] [place] and was able to contact the office to obtain the phone number.  Prior to admission he notes getting injections into the right eye every 6 weeks and has not gotten these for greater than 5 months and notes his vision has been declining.  He does not endorse having any unusual pain or pressure in the eyes.  He notes encrustations in the eyes overnight.    Rahat also reports limited food options and notes that generally he is eating an omelette, the hospital orange juice and peaches every day and that he has had very limited success with ordering food due to the food becoming cold.     He reports he has not had a bowel movement for the past 4 days and is aware that he has [place] and laxatives but requests to hold off on taking these.  He denies having any abdominal pain or discomfort and continues to tolerate p.o. otherwise without issues.    He has otherwise been in his usual state of health and does not endorse having any fevers, chills, rashes, skin changes, bleeding, new pain, swollen glands or lumps, changes in sensation, other complaints    Physical Exam   Vital Signs: Temp: (!) 96.2  F (35.7  C) Temp src: Axillary BP: 137/66 Pulse: 82   Resp: 18 SpO2: " 93 % O2 Device: None (Room air)    Weight: 260 lbs 1.6 oz  GENERAL: Alert and oriented x3. NAD.  Sitting upright at approximately 60 degrees in a recliner chair.  Appears comfortable. Cooperative.   HEENT: 5/5 strength of bilateral cervical rotation.  Intact cervical extension and flexion . anicteric sclera. Mucous membranes moist. NC. AT. EOMI. PERRLA  CV: RRR. S1, S2. No murmurs appreciated.   RESPIRATORY: Effort normal on RA. Lungs CTAB with no wheezing, rales, rhonchi.   GI: Abdomen soft and non distended with normoactive bowel sounds present in all quadrants. No tenderness, rebound, guarding. No lesions.   NEUROLOGICAL: No focal deficits. Moves all extremities.  CN 2-12 grossly intact.  EXTREMITIES: 5/5 strength right lower extremity.  Left lower extremity with intact hip extension, plantar dorsiflexion minimal ability to move left knee.  No peripheral edema. Intact bilateral pedal pulses.   SKIN: No jaundice. No rashes on exposed skin.      Medical Decision Making       45 MINUTES SPENT BY ME on the date of service doing chart review, history, exam, documentation & further activities per the note.      Data

## 2023-05-12 NOTE — PLAN OF CARE
[id number]  Patient is alert and oriented x4. Continent of both bowel and bladder. Urinal within reach. Patient denied chest pain, SOB, N&V, and cough. Offered shower this shift but declined. Patient up in recliner for most of this shift. PRN tramadol and acetaminophen given for left hip pain after transfer.    [id number]  No acute concerns this shift. Declined PRN pain intervention when offered. Awake during rounds at 0240 but noted to be asleep at 0400. Continue with POC.    Patient's most recent vital signs are:     Vital signs:  BP: 114/66  Temp: 96.8  HR: 82  RR: 18  SpO2: 92 %     Patient does not have new respiratory symptoms.  Patient does not have new sore throat.  Patient does not have a fever greater than 99.5.

## 2023-05-12 NOTE — PLAN OF CARE
Goal Outcome Evaluation:       VSS. Alert and orientedx4., on Victoza and metformin. Verbalized neck pain- heating pad to neck, robaxin and oxycodone given per pts request, and its somewhat effective. Used urinal- staff emptied. NWB to LLE- liko lift to transfer. Encourage to get OOB to recliner. Per pt he does not eat breakfast and lunch as his routine. He does eat dinner only.  Will continue plan of care.  Vital signs:  Temp: (!) 96.2  F (35.7  C) Temp src: Axillary BP: 137/66 Pulse: 82   Resp: 18 SpO2: 93 % O2 Device: None (Room air)     Weight: 118 kg (260 lb 1.6 oz)

## 2023-05-13 NOTE — PLAN OF CARE
Goal Outcome Evaluation:       Spent day in bed. States he will get up in recliner this evening. Continent of bowel and bladder. Eats dinner only and snacks during the day. Alert and oriented and verbalizes his needs. No new changes. Upcoming surgery 5/26/23.        Patient's most recent vital signs are:     Vital signs:  BP: 126/62  Temp: 95.3  HR: 67  RR: 16  SpO2: 98 %     Patient does not have new respiratory symptoms.  Patient does not have new sore throat.  Patient does not have a fever greater than 99.5.

## 2023-05-13 NOTE — PLAN OF CARE
Goal Outcome Evaluation:      Plan of Care Reviewed With: patient    Overall Patient Progress: no change    Pt c/o posterior neck pain which he claimed recently started. Per review of notes, pt has Chronic neck pain. Available PRN med at this time: Tylenol 1000 mg tab and Robaxin 500 mg tab- given. Also utilizing Aqua K pad. Instructed pt to inform staff if neck pain does not get better. Pt acknowledged. Pt has not called after an hour and when rounded around 0230, pt sleeping comfortably. Succeeding rounds also with pt sleeping. Called x 1 but requested only water. Will continue to monitor pain and intervene as needed. Had previously refused offer of assistance with repositioning despite PUP education-Again, encouraged and offered assistance to reposition in bed tonight but pt denied to need help and claimed he is able and has been shifting weight while in bed. Using urinal in bed, staff empties. Has not been using his CPAP when sleeping- denies need for O2 supplement. Pt has no c/o SOB and no s/s of respiratory issue noted. Appear to be sleeping/resting during rounds. Continue with plan of care.    Patient's most recent vital signs are:     Vital signs:  BP: 123/66  Temp: 95.9[just drank ice water[  HR: 79  RR: 16  SpO2: 96 %     Patient does not have new respiratory symptoms.  Patient does not have new sore throat.  Patient does not have a fever greater than 99.5.

## 2023-05-13 NOTE — PLAN OF CARE
Goal Outcome Evaluation:    Patient is alert and oriented x 4. He is able to make his needs known. Pt denied SOB and chest pain. Blood glucose level was 90. Pt is regular diet, and able to swallow without difficulty. Pt remain NWB to LLE. He is continent of bowel and bladder with urinal within reach at bedside. Pt refused to reposition. He also refused scheduled Miconazole powder.    Patient's most recent vital signs are:     Vital signs:  BP: 122/63  Temp: 97  HR: 76  RR: 18  SpO2: 98 %     Patient does not have new respiratory symptoms.  Patient does not have new sore throat.  Patient does not have a fever greater than 99.5.

## 2023-05-13 NOTE — PLAN OF CARE
Goal Outcome Evaluation:        Pt is alert and oriented x 4. Able to make needs known to staffs. Pt denied having chest pain, SOB, N/V, fever and chills. Complained of pain of 8/10. Tramadol administered with robaxin. Pt verbalized effectiveness. Pt remain NWB to LLE. Utilizes Liko lift for transfer. Was on the recliner for halve of the shift. No complain at this time. Will continue with POC.      Patient's most recent vital signs are:     Vital signs:  BP: 123/66  Temp: 95.9[just drank ice water[  HR: 79  RR: 16  SpO2: 96 %     Patient does not have new respiratory symptoms.  Patient does not have new sore throat.  Patient does not have a fever greater than 99.5.

## 2023-05-14 NOTE — PLAN OF CARE
Goal Outcome Evaluation:      Skin check done. Buttocks red, dry, excoriated with cracked skin and minimal bleeding. States skin is itchy and was scratching last night. Areas cleansed with martell skin cleanser and [employer]  Barrier cream applied. After skin care, patient is up in recliner via Liko lift. No new changes. Upcoming surgery 5/26/23.         Patient's most recent vital signs are:     Vital signs:  BP: 128/64  Temp: 95.5  HR: 69  RR: 16  SpO2: 95 %     Patient does not have new respiratory symptoms.  Patient does not have new sore throat.  Patient does not have a fever greater than 99.5.

## 2023-05-14 NOTE — PLAN OF CARE
Goal Outcome Evaluation:      Plan of Care Reviewed With: patient    Overall Patient Progress: no change    Chronic posterior neck pain comfortably managed with Tylenol 1000 mg, Tramadol 25 mg and Robaxin 500 mg tab- given. Also utilizing Aqua K pad. Instructed pt to inform staff if neck pain does not get better. Pt acknowledged but made no further calls r/t pain.  Will continue to monitor pain and intervene as needed. Had previously refused offer of assistance with repositioning despite PUP education-Again, encouraged and offered assistance to reposition in bed tonight but pt denied to need help and claimed he is able and has been shifting weight while in bed. Using urinal in bed, staff empties. Bed linen changed x 1. Pt had to be lifted off the bed using ceiling lift as he refused to turn to sides to complete the tasks d/t pain.Chronic left hip PJI s/p explantation, debridement, and reconstruction with antibiotic spacer was already about 6 months ago. Has not been using his CPAP when sleeping- denies need for O2 supplement. Pt has no c/o SOB and no s/s of respiratory issue noted. Appear to be sleeping/resting during rounds. Continue with plan of care.    Patient's most recent vital signs are:     Vital signs:  BP: 122/63  Temp: 97  HR: 76  RR: 18  SpO2: 98 %     Patient does not have new respiratory symptoms.  Patient does not have new sore throat.  Patient does not have a fever greater than 99.5.

## 2023-05-15 NOTE — PLAN OF CARE
Goal Outcome Evaluation:    Patient is alert and oriented x 4. He is able to make his needs known. Pt denied SOB and chest pain. Blood glucose level was 97. Pt is regular diet, and able to swallow without difficulty. Pt remain NWB to LLE. He is continent of bowel and bladder with urinal within reach at bedside. Pt refused to reposition. He also refused scheduled Miconazole powder.    Patient's most recent vital signs are:     Vital signs:  BP: 120/65  Temp: 95.3  HR: 72  RR: 16  SpO2: 96 %     Patient does not have new respiratory symptoms.  Patient does not have new sore throat.  Patient does not have a fever greater than 99.5.

## 2023-05-15 NOTE — PLAN OF CARE
Goal Outcome Evaluation:      Plan of Care Reviewed With: patient    Overall Patient Progress: no change    Pt has no c/o pain or discomfort so far this shift. Awake at midnight, talking on his phone.  Had previously refused offer of assistance with repositioning despite PUP education-Again, encouraged and offered assistance to reposition in bed tonight but pt denied to need help and claimed he is able and has been shifting weight while in bed.  Using urinal in bed, staff empties. Requested to use bed pan but refused to turn to R side ( good hip) so staff can place bed pan. Ceiling lift was used to lift him up off bed to place bed pan. Has not been using his CPAP when sleeping- denies need for O2 supplement. Pt has no c/o SOB and no s/s of respiratory issue noted. Appear to be sleeping/resting on/off. Still here pending L BELLA revision surgery on 5/26. Continue with plan of care.    Patient's most recent vital signs are:     Vital signs:  BP: 120/65  Temp: 95.3  HR: 72  RR: 16  SpO2: 96 %     Patient does not have new respiratory symptoms.  Patient does not have new sore throat.  Patient does not have a fever greater than 99.5.

## 2023-05-15 NOTE — PLAN OF CARE
Goal Outcome Evaluation:         VSS. Alert and orientedx4. Declined repositioning. BG 85. A-2 lift for transfer.    Verbalized pain to neck- pts requested tramadol and tylenol and robaxin with some relief. Does not eat breakfast and lunch. Declined repositioning  1330 Went to recliner With NA's. DEEP buttocks but per NA pt has redness to buttocks and barrier was applied. Pt declined to get back to bed yet to assess by writer.   Will let the ff shift  assess buttocks when pt get back to bed.  Vital signs:  Temp: (!) 95.2  F (35.1  C) Temp src: Oral BP: 133/61 Pulse: 92   Resp: 16 SpO2: 95 % O2 Device: None (Room air)

## 2023-05-16 NOTE — PLAN OF CARE
Goal Outcome Evaluation:    FOCUS/GOAL    Bowel management, Bladder management, Medication management, Pain management, Skin integrity and Safety management    ASSESSMENT, INTERVENTIONS AND CONTINUING PLAN FOR GOAL:    Pt is A&OX4, calm, & cooperative with care. Denied CP, SOB, & n/v. A of 2 with Liko lift for transfer; was not OOB this shift. NWB to the LLE. Continent for both B&B; uses bedpan for BM. Urinal at the bedside for bladder. Takes med whole with thin liquid. Pt slept well throughout the night & no acute issue. Able to make needs known & call light within reach. Will continue with POC.    Patient's most recent vital signs are:     Vital signs:  BP: 113/58  Temp: 95.9  HR: 82  RR: 16  SpO2: 95 %     Patient does not have new respiratory symptoms.  Patient does not have new sore throat.  Patient does not have a fever greater than 99.5.

## 2023-05-16 NOTE — PROGRESS NOTES
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 Hospital course c/b toxic encephalopathy, acute hypoxic respiratory failure d/t OHS, acute blood loss anemia, pre-renal HALEY, and profound physical deconditioning. Transferred to TCU on 12/24/22 for rehabilitation. He is no longer progressing with therapies due to both pain and lack of motivation. He is currently awaiting placement at a long term SNF.    #Physical debility  #Chronic left hip PJI s/p explantation, debridement and reconstruction with antibiotic spacer (11/22/22)  PT/OT no longer following as patient had not been progressing and intermittently refusing transfers and repositioning.    - Per therapy, pt will need to demonstrate willingness to get OOB to chair as part of therapy before progressing further with therapy.   - SW following for placement  - Anticipate patient will remain at TCU until next surgery  - NWB to LLE  - Scheduled for L BELLA revision with Dr. Whitt on 5/26    #Chronic neck pain  Pain overall stable and adequately controlled. No neurologic symptoms. Intact strength in neck.   - Tylenol 100mg TID PRN  - Robaxin 500mg QID PRN  - Tramadol 25mg q6h PRN  - Heating pad PRN     #Depression  Depressed mood in setting of ongoing health issues and knowledge of probable 4-5 more months in TCU following surgery. Passive suicidal statements to nursing staff. Patient denies active suicidal ideation but also states that any plans would be limited being at TCU. No actual plan. Suspect situational depression. Pt seems to be in denial. Does not like the idea of  psychology or psychiatry but would prefer not to start additional medications.   - Health psychology consulted but not available in TCU  - Declines psychiatry consultation      #Moisture associated skin damage  Dependent maceration to left buttock and upper thigh noted on prior WOCN assessments   - Declines further assessment at this time      #DM2  Well-controlled. A1C 5.8 on 3/6. PTA glipizide discontinued. Blood sugars well controlled 80-110s. No hypoglycemia.   - Glucose checks BID  - Metformin XR 2000 mg daily   - Actos 45 mg daily  - Victoza 1.8 mg subcutaneous daily (PTA Trulicity is non formulary)      #HLD  - Continue statin.      #JAIR  On CPAP at home, frequently refuses at TCU. Monitor.     #Hx DVT/PE  No acute concerns.   - Continue PTA apixaban 5 mg BID     #Moderate malnutrition  #Sarcopenia  #Obesity (BMI 37)  Per RD assessment BMI decreasing in the setting of sarcopenia    Resolved Problems:   #HALEY.  Cr baseline 0.9-1.1. Creatinine bumped to 1.33 on 1/25 in setting of poor PO intake. Suspect volume depletion. Renal function improved w/ holding diuretics and IV hydration. Will follow BMP weekly.     Consulting Services: None     CODE: Full Code  DVT: DOAC  Diet: Regular   Indications for Psychotropic Medications: Robaxin and Tramadol at lowest effective doses for chronic pain.   Disposition: Pending placement at [facility]    Natali Leonardo, MPH, ACNP  Hospitalist Service  Pager [phone]    ________________________________________________________________    Subjective & Interval History:  No overnight events.  Declined repositioning per RN notes.  No fevers, chills, nausea, vomiting, chest pain or shortness of breath.      Last 24 hour care team notes reviewed.   ROS: 4 point ROS (including Respiratory, CV, GI and ) was performed and negative unless otherwise noted in HPI.     Medications: Reviewed in EPIC.    Physical Exam:    Blood pressure 113/58, pulse 82, temperature (!) 95.9  F (35.5  C),  temperature source Axillary, resp. rate 16, weight 118 kg (260 lb 1.6 oz), SpO2 95 %.    GENERAL: Alert and oriented x 3. Appears comfortable in bed.    HEENT: Anicteric sclera. Mucous membranes moist.   CV: RRR. S1, S2. No murmurs appreciated.   RESPIRATORY: Effort normal on room air. Lungs CTAB with no wheezing, rales, rhonchi.   GI: Abdomen soft and non distended, bowel sounds present. No tenderness, rebound, guarding.   NEUROLOGICAL: No focal deficits. Moves all extremities.    EXTREMITIES: No peripheral edema. Intact bilateral pedal pulses.   SKIN: No jaundice. No rashes.

## 2023-05-16 NOTE — PLAN OF CARE
Goal Outcome Evaluation:       Patient sits on his recliner. Offered his Robaxin and tramadol, accepted. Declined repositioning even head leaning more on his R side.Ate dinner with good appetite.Bottom is blanchable redness,healing , applied barrier cream. Uses urinal  and is with in reach. Can make needs known.Call lights with in reach.    Blood pressure 113/58, pulse 82, temperature (!) 95.9  F (35.5  C), temperature source Axillary, resp. rate 16, weight 118 kg (260 lb 1.6 oz), SpO2 95 %.

## 2023-05-16 NOTE — PLAN OF CARE
Goal Outcome Evaluation:             Alert and correctly oriented and verbalizes his needs. Up in recliner this afternoon. Had subway sandwich for lunch. No new changes. Pleasant and more talkative today.        Patient's most recent vital signs are:     Vital signs:  BP: 117/63  Temp: 95.8  HR: 71  RR: 16  SpO2: 93 %     Patient does not have new respiratory symptoms.  Patient does not have new sore throat.  Patient does not have a fever greater than 99.5.

## 2023-05-16 NOTE — PROGRESS NOTES
[facility] communicated with  asking them to get ahold of [facility].  Nephew has not gotten back to  in a few weeks as to were we are at with MA application.  [facility] has not seen any payment for 5 months. Last alejandra was suppose to be selling the vans but ZEB has not heard anything about that.     FERDINAND/Catrachito called the [facility] and they said SOME of the info has been turned in yesterday.  Pt is still over assets.  Catrachito is going to send me a list of Elder Law attorneys and I will forward that on to nephew.   ZEB forward list of 's on to nephew.       NEREYDA Sharp   [facility], Transitional Care Unit   Social Work   [facility] [place] [address], [address] [place] [address]  ) [phone]

## 2023-05-16 NOTE — PROGRESS NOTES
CLINICAL NUTRITION SERVICES - REASSESSMENT NOTE     Nutrition Prescription    RECOMMENDATIONS FOR MDs/PROVIDERS TO ORDER:  Please obtain new weight as able    Malnutrition Status:    Moderate malnutrition in the context of acute on chronic illness    Recommendations already ordered by Registered Dietitian (RD):  Encouraged intakes and use of outside food  Calorie counts    Future/Additional Recommendations:  Monitor labs, intakes, and weight trends.     EVALUATION OF THE PROGRESS TOWARD GOALS   Diet: Regular  Intake/[place]: 100% of documented meals, poorly documented    Pt ordering one meal daily (no meals ordered 4/7) and on average ordering 700 kcal and 25 g protein per day per [employer].     Pt is likely meeting <50% minimum energy and protein needs.     NEW FINDINGS/REVIEW OF SYSTEMS    Nutrition/GI: RD met with pt at bedside. Discussed concerns for pt's intakes and that alternate forms of nutrition may be considered if they continue to be inadequate. Pt states he would refuse that and writer reviewed menu and floor-stock offerings. Provided ideas for outside foods and offered to deliver food to pt if necessary to promote adequate intakes. Encouraged pt to use snacks/supplements when not hungry. Discussed concerns with nurse.     Weights: Pt with previous 63 lb (19%) weight loss over 1 month. Pt weight stable since admission, no new weight since 4/10.   Wt Readings from Last Encounters:   04/10/23 118 kg (260 lb 1.6 oz)   03/23/23 117 kg (257 lb 14.4 oz)   01/09/23 119.7 kg (264 lb)   12/30/22 118.5 kg (261 lb 3.2 oz)   11/22/22 147.1 kg (324 lb 4.8 oz)   11/14/22 142.9 kg (315 lb)   11/09/22 146.8 kg (323 lb 9.6 oz)   05/19/22 136.1 kg (300 lb)   07/03/18 148.9 kg (328 lb 4.8 oz)      Labs and meds reviewed    MALNUTRITION  % Intake: < 75% for >/= 1 month (moderate)  % Weight Loss: None noted over last 4 months  Subcutaneous Fat Loss: Upper arm: moderate  Muscle Loss: Global moderate  Fluid  Accumulation/Edema: Mild  Malnutrition Diagnosis: Moderate malnutrition in the context of chronic illness     Previous Goals  Patient to consume % of nutritionally adequate meal trays TID, or the equivalent with supplements/snacks.  Evaluation: Not met    Previous Nutrition Diagnosis  Inadequate oral intake related to poor appetite, dislike of menu items as evidenced by patient report, pt ordering < 50% of needs daily.     Evaluation: No change     CURRENT NUTRITION DIAGNOSIS  Inadequate oral intake related to poor appetite, dislike of menu items as evidenced by patient report, pt ordering < 50% of needs daily.      INTERVENTIONS  Implementation  Nutrition education for nutrition relationship to health/disease    Goals  Patient to consume % of nutritionally adequate meal trays TID, or the equivalent with supplements/snacks.     Monitoring/Evaluation  Progress toward goals will be monitored and evaluated per protocol.    Dee Alanis MS, RDN, LDN  RD pager: [phone]   Weekend/Holiday Pager: [phone]

## 2023-05-16 NOTE — PLAN OF CARE
Goal Outcome Evaluation:      Plan of Care Reviewed With: patient    Overall Patient Progress: no change    Outcome Evaluation: Patient with poor appetite/intakes. Discussed options from menu and from outside. Continue to encourage intakes, will continue to monitor intakes.

## 2023-05-17 NOTE — PLAN OF CARE
Goal Outcome Evaluation:       Patient has been in the recliner from PM shift. Stay in 1 side and declined for repositioning. Head leans more on his R side. Asked for Tramadol and Robaxin at HS. Can make needs known. Call light with in reach.    Blood pressure 113/51, pulse 84, temperature (!) 96.2  F (35.7  C), temperature source Axillary, resp. rate 16, weight 118 kg (260 lb 1.6 oz), SpO2 96 %.

## 2023-05-17 NOTE — PLAN OF CARE
Goal Outcome Evaluation:      Plan of Care Reviewed With: patient    Overall Patient Progress: no change    Pt has no c/o pain or discomfort so far this shift. Awake at midnight, talking on his phone.  Had previously refused offer of assistance with repositioning despite PUP education-Again, encouraged and offered assistance to reposition in bed tonight but pt denied to need help and claimed he is able and has been shifting weight while in bed.  Using urinal in bed, staff empties. Has not been using his CPAP when sleeping- denies need for O2 supplement. Pt has no c/o SOB and no s/s of respiratory issue noted. Appear to be sleeping/resting on/off. Still here pending L BELLA revision surgery on 5/26. Continue with plan of care.    Patient's most recent vital signs are:     Vital signs:  BP: 113/51  Temp: 96.2  HR: 84  RR: 16  SpO2: 96 %     Patient does not have new respiratory symptoms.  Patient does not have new sore throat.  Patient does not have a fever greater than 99.5.

## 2023-05-17 NOTE — PROGRESS NOTES
"SURGERY PLAN (PRE-OP PLAN)     Patient Position (indicated by x):  X  Lateral decubitus, Wixson hip positioner    x  Regular OR table    x  Tran Catheter    x  Revision BELLA drape with plastic side bags   x   Blue U drape x2   Blue and white stockinet   Coban   Ioban      General Equipment Requests (indicated by x):    X  Small pituitary rongeur   X  Meyers's angled curettes, narrow shaft   X  Midas Todd      Phenol 5%      Blunt Pelvic Retractor (.55, Blunt Hohmann with  slight bend)      Lambotte Osteotomes      Implant Extraction/Cement spacer tools (indicated by x):      Biomet Ultrasound cement removal      Midas Yosvany, AC-1 (1mm tiny dissecting tip with wire guide gold handpiece)    x  Flexible osteotomes (both black and wood handled with new replacement blades)     Universal stem extractor   X  \"L\" hook & hoop style stem extractor stem extractor ([employer] AML stem extractor)   x  lambotte osteotomes   X  Suction tip with debris trap (clear plastic disposable)   X  Biomet offset implant impactor (white handle in Meyers's cart)   X  Emmanuelle IM canal long shaft cement removal gouges, pituitary ronguers)     BELLA Requests (indicated by x):     Biomet ECHO Bimetric ingrowth stem      Biomet Integral cemented stem      Biomet RB cup, 28+32 heads      Biomet Bipolar cup     Yonatan Constrained liner with heads      Church Nephew BHR resurfacing BELLA      DePuy S-ROM long stems      Verona Restoration modular long stem   X  Biomet JEZ long stems, both interlocked and splined stems   X  Biomet G7 acetabulum cup + augments     Yonatan TM Acetabulum cup      Otis Tritanium TM cup +  augments      Otis GAP II acet cage + cemented poly cup      Biomet OSS prox femur replacement BELLA      Biomet OSS Compress fixation      IM flexible reamers + guidewire, < 9 mm   X  IM flexible reamers + guidewire, > 9 mm       Allograft: femoral head      Allograft: distal femur      Allograft: proximal tibia      Bone mill      " Allograft cancellous chips      Allograft cancellous crushed   X  Dall Miles Troch Claw + cable, insertion instruments HOLD   X  Dall Miles beaded cerclage cable, green cable  x2 , insertion instruments HOLD      Yonatan CTR Troch cable plate      AO pelvic instruments and clamps (angled) Blunt Hohmann & angled Pelaez, large bone reduction forceps      Specimens and cultures (indicated by x):   X5  Tissue cultures, aerobic and anaerobic without gram stain      Frozen section      pathology specimens - fresh      pathology specimens - formalin     Angelito Garcia MD  Adult Reconstructive Surgery Fellow  Department of Orthopaedic Surgery, [facility]

## 2023-05-17 NOTE — PLAN OF CARE
Goal Outcome Evaluation:             Patient weighed today. Weight-241.8 lbs. Significant weight loss from 4/10/23 when his weight was 260.1 lb. Patient usually only eats one meal a day. He is happy that he has lost some weight; I encouraged him to eat nutritious foods and drink adequate amounts of fluids. He has been on TCU for a lengthy time and is tired of the food availability. Occasionally will order food from outside restaurants and have delivered. Up in recliner today via Liko lift. Continent of bowel and bladder. Has been more interactive with staff and pleasant.         Patient's most recent vital signs are:     Vital signs:  BP: 136/66  Temp: 96  HR: 75  RR: 16  SpO2: 96 %     Patient does not have new respiratory symptoms.  Patient does not have new sore throat.  Patient does not have a fever greater than 99.5.

## 2023-05-18 NOTE — PLAN OF CARE
Goal Outcome Evaluation:      Plan of Care Reviewed With: patient    Overall Patient Progress: no changeOverall Patient Progress: no change    Outcome Evaluation: enc meal ordering    Pt is A&Ox4. He was continent of B&B. Pt did not order meals this shift. He requested pain medication twice this shift. Pt enc to gently stretch neck muscles when upright in chair. Pt up in recliner at 1000 throughout the shift. He was lift from bed to chair.       Patient's most recent vital signs are:     Vital signs:  BP: 118/66  Temp: 97.4  HR: 78  RR: 16  SpO2: 96 %     Patient does not have new respiratory symptoms.  Patient does not have new sore throat.  Patient does not have a fever greater than 99.5.

## 2023-05-18 NOTE — PLAN OF CARE
Goal Outcome Evaluation:      Plan of Care Reviewed With: patient    Overall Patient Progress: no change      Pt has no c/o pain or discomfort so far this shift. Awake at midnight watching TV. Had previously refused offer of assistance with repositioning despite PUP education-Again, encouraged and offered assistance to reposition in bed tonight specially that he tends to lean more on his right       ( not new) but pt denied needing  help and claimed he is able and has been shifting weight while in bed.  Using urinal in bed, staff empties. Has not been using his CPAP when sleeping- denies need for O2 supplement. Pt has no c/o SOB and no s/s of respiratory issue noted. Appear to be sleeping/resting on/off. Still here pending L BELLA revision surgery on 5/26. Continue with plan of care.    Patient's most recent vital signs are:     Vital signs:  BP: 119/65  Temp: 95.6  HR: 64  RR: 16  SpO2: 98 %     Patient does not have new respiratory symptoms.  Patient does not have new sore throat.  Patient does not have a fever greater than 99.5.

## 2023-05-19 NOTE — PROGRESS NOTES
SW received a call from [facility] . Waiver is going to close on the 26th or 27th as [place] is not open yet.  ZEB said SW is trying hard to help family a long but slow go. She will have to close him but keep him on her team as she knows him and it would not be hard to redo.  She will also ask for relocation worker.     NEREYDA Sharp   [facility], Transitional Care Unit   Social Work   [facility] [place] [address], [address] [place] [address] (PH) [phone]

## 2023-05-19 NOTE — PLAN OF CARE
Goal Outcome Evaluation:         Pt alert and oriented x4, able to make needs known. No c/o chest pain, SOB, N/V. Pt was back in bed this shift after staying in the recliner in AM. Given PRN Tylenol, Robaxin, and Tramadol at around 1600H. Consumed 100% of dinner. Continent of both B&B, urinal emptied. Pleasant and cooperative. Placed call light and urinal within reached. Will continue with POC.               Patient's most recent vital signs are:     Vital signs:  BP: 130/72  Temp: 96.5  HR: 76  RR: 16  SpO2: 96 %     Patient does not have new respiratory symptoms.  Patient does not have new sore throat.  Patient does not have a fever greater than 99.5.

## 2023-05-19 NOTE — PROGRESS NOTES
Nurse's Notes                                                                                     

 [facility]                                                                 

Name: Reynold Mcmillan                                                                          

Age: 22 yrs                                                                                       

Sex: Male                                                                                         

: 2000                                                                                   

MRN: [id number]                                                                                   

Arrival Date: 10/01/2022                                                                          

Time: 13:15                                                                                       

Account#: [id number]                                                                            

Bed 19                                                                                            

Private MD:                                                                                       

Diagnosis: Right upper quadrant abdominal pain;Gallbladder sludge                                 

                                                                                                  

Presentation:                                                                                     

10/01                                                                                             

13:20 Chief complaint: Patient states: I started having really bad abdominal pain yesterday   bm7 

      and I went to [place] [facility]. They said there was nothing wrong with me. Today the pain        

      has gotten worse and I can't go on another day like this. Coronavirus screen: At this       

      time, the client does not indicate any symptoms associated with coronavirus-19. Ebola       

      Screen: No symptoms or risks identified at this time. Initial Sepsis Screen: Does the       

      patient meet any 2 criteria? No. Patient's initial sepsis screen is negative. Does the      

      patient have a suspected source of infection? Yes: Acute abdominal pain. Risk               

      Assessment: Do you want to hurt yourself or someone else? Patient reports no desire to      

      harm self or others. Onset of symptoms was 2022.                              

13:20 Method Of Arrival: Ambulatory                                                           bm7 

13:20 Acuity: ROSIE 3                                                                           bm7 

                                                                                                  

Triage Assessment:                                                                                

13:22 General: Appears in no apparent distress. uncomfortable, Behavior is calm, cooperative, bm7 

      appropriate for age. Pain: Complains of pain in right upper quadrant. EENT: No deficits     

      noted. No signs and/or symptoms were reported regarding the EENT system. Neuro: No          

      deficits noted. Cardiovascular: No deficits noted. Respiratory: No deficits noted. GI:      

      Abdomen is round non-distended, Bowel sounds present X 4 quads. Abd is soft X 4 quads       

      Abdomen is tender to palpation in right upper quadrant Reports nausea, vomiting. : No     

      deficits noted. No signs and/or symptoms were reported regarding the genitourinary          

      system. Derm: No deficits noted. No signs and/or symptoms reported regarding the            

      dermatologic system. Musculoskeletal: No deficits noted. No signs and/or symptoms           

      reported regarding the musculoskeletal system.                                              

                                                                                                  

Historical:                                                                                       

- Allergies:                                                                                      

13:22 No Known Allergies;                                                                     bm7 

- Home Meds:                                                                                      

13:22 None [Active];                                                                          bm7 

- PSHx:                                                                                           

13:22 Appendectomy;                                                                           bm7 

                                                                                                  

- Immunization history:: Adult Immunizations up to date, Client reports having NOT                

  received the Covid vaccine.                                                                     

- Social history:: Smoking status: Reported history of juuling and/or vaping.                     

                                                                                                  

                                                                                                  

Screenin:30 Abuse screen: Denies threats or abuse. Denies injuries from another. Nutritional        bp  

      screening: No deficits noted. Tuberculosis screening: No symptoms or risk factors           

      identified. Fall Risk None identified.                                                      

                                                                                                  

Assessment:                                                                                       

13:30 General: SEE TRIAGE NOTE.                                                               bp  

15:00 Reassessment: No changes from previously documented assessment. Patient and/or family   bp  

      updated on plan of care and expected duration. Pain level reassessed.                       

17:27 Reassessment: PT DC HOME.                                                               bp  

                                                                                                  

Vital Signs:                                                                                      

13:19  / 72; Pulse 100; Resp 16; Temp 99.2(TE); Pulse Ox 98% on R/A; Weight 90.72 kg    bm7 

      (R); Height 5 ft. 7 in. (170.18 cm); Pain 10/10;                                            

15:00  / 58; Pulse 70; Resp 16; Pulse Ox 100% ;                                         bp  

17:27  / 52; Pulse 73; Resp 16; Pulse Ox 100% ;                                         bp  

13:19 Body Mass Index 31.32 (90.72 kg, 170.18 cm)                                             bm7 

                                                                                                  

ED Course:                                                                                        

13:15 Patient arrived in ED.                                                                  as  

13:18 Francine Lawson MD is Attending Physician.                                           sd2 

13:19 Arm band placed on left wrist.                                                          bm7 

13:22 Triage completed.                                                                       bm7 

13:30 Patient has correct armband on for positive identification. Bed in low position. Call   bp  

      light in reach. Side rails up X2.                                                           

14:10 Initial lab(s) drawn, by me, sent to lab. Inserted saline lock: 20 gauge in left        3 

      antecubital area, using aseptic technique. Blood collected.                                 

14:19 Urine Microscopic Only Sent.                                                            3 

15:12 Nelson Campbell, JUAN is Primary Nurse.                                                    bp  

17:02 Refugio Hamilton MD is Referral Physician.                                               sd2 

17:28 No provider procedures requiring assistance completed. IV discontinued, intact,         bp  

      bleeding controlled, No redness/swelling at site. Pressure dressing applied.                

                                                                                                  

Administered Medications:                                                                         

14:02 Drug: Ketorolac 15 mg Route: IVP; Site: left antecubital;                               jl7 

17:27 Follow up: Response: No adverse reaction                                                bp  

14:02 Drug: NS 0.9% 500 ml Route: IV; Rate: bolus; Site: left antecubital;                    jl7 

17:29 Follow up: IV Status: Completed infusion; IV Intake: 500ml                              bp  

17:20 Drug: Ketorolac 15 mg Route: IVP; Site: left antecubital;                               bp  

17:27 Follow up: Response: No adverse reaction                                                bp  

                                                                                                  

                                                                                                  

Intake:                                                                                           

17:29 IV: 500ml; Total: 500ml.                                                                bp  

                                                                                                  

Outcome:                                                                                          

17:03 Discharge ordered by MD. [email or web address] 

17:28 Discharged to home ambulatory.                                                          bp  

17:28 Condition: stable                                                                           

17:28 Discharge instructions given to patient, Instructed on discharge instructions, follow       

      up and referral plans. medication usage, Demonstrated understanding of instructions,        

      follow-up care, medications, Prescriptions given X 2.                                       

17:29 Patient left the ED.                                                                    bp  

                                                                                                  

Signatures:                                                                                       

Amie Oliver Jahala, RN                        RN   jl7                                                  

Maria Ines Nunn                              3                                                  

Nelson Campbell RN                      RN   Ambar Hair, JUAN                  RN   7                                                  

Francine Lawson MD MD   sd2                                                  

                                                                                                  

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 Hospital course c/b toxic encephalopathy, acute hypoxic respiratory failure d/t OHS, acute blood loss anemia, pre-renal HALEY, and profound physical deconditioning. Transferred to TCU on 12/24/22 for rehabilitation and has been here since awaiting community placement.      #Physical Debility.  #Chronic Left Hip PJI s/p explantation, debridement and reconstruction with antibiotic spacer (11/22/22).  Dr. Meyers. Completed antibiotic course back on 1/3/23. Underwent biopsy under anesthesia on 4/7 with no e/o recurrent or persistent infection so will be proceeding with left BELLA reimplantation surgery on 5/26. Has lost a lot of strength and muscle mass with bedbound status. PT/OT no longer following d/t lack of progress and intermittent refusals.   - Remains NWB LLE.   - To OR 5/26 for BELLA reimplantation w/ Dr. Meyers. Note: call placed to ortho clinic today and they would like anticoagulation held 3 days prior to surgery (so last dose of apixaban will be 5/22 PM).   - Continue to encourage up to chair.      #Chronic Neck Pain. Pain overall stable and adequately controlled.   - Continue PRN Tylenol, Robaxin, Tramadol, heating pad.      #Depression vs. Adjustment Disorder with Depressed Mood. Depressed mood in setting of ongoing health issues. Passive suicidal statements to nursing staff in the past. Not interested in medication therapy or psychiatry consultation.   - Health psychology was not available to follow here.      #DM II. Well-controlled. A1C 5.8%  on 3/6. PTA glipizide discontinued d/t hypoglycemia risk.   - Glucose checks BID.  - Continue home metformin and Actos.   - Continue Victoza (sub for PTA Trulicity).   - Hold diabetic meds morning of surgery next week.   - Nursing requesting podiatry to trim toenails (consult placed for next podiatry rounds).      #HLD. Continue statin.      #JAIR. Home CPAP as willing.      #Hx DVT/PE. Nothing recent.   - Continue apixaban through 5/22 PM as above, then hold 3 days prior to 5/26 OR. Patient should be encouraged to get up to chair and use SCDs while off the apixaban.     #Moderate Malnutrition.  #Sarcopenia.  #Obesity.  BMI decreasing in the setting of sarcopenia.   - Dietitian following.      CODE: full  DVT: apixaban   Diet: regular  Indications for Psychotropic Medications: N/A  Disposition: discharge to OR on 5/26 for hip replacement surgery; anticipate continued need for TCU after whether here or elsewhere     Liliya Lai PA-C  Hospitalist Service  Pager: [phone]  ________________________________________________________________    Subjective & Interval History:  Don is doing fine. He is thinking about surgery next week and is wondering if he will return to this TCU or go somewhere else, would like to discuss with social work. It is important to him that he receives full therapy services after his surgery.     Last 24 hour care team notes reviewed.     Physical Exam:    Blood pressure (!) 144/67, pulse 77, temperature 96.9  F (36.1  C), temperature source Oral, resp. rate 18, weight 110.1 kg (242 lb 12.8 oz), SpO2 95 %.    GENERAL: Alert and oriented x 3. Lying in bed, appears comfortable. Conversant.   HEENT: Anicteric sclera. Mucous membranes moist.   NEUROLOGICAL: No focal deficits. Moves all extremities.    EXTREMITIES: No peripheral edema.   SKIN: No jaundice. No rashes. Toenails are long.      Lines/Tubes/Drains:  none    Medical Decision Making:             Data:  None - will get routine labs the  day prior to surgery next week

## 2023-05-19 NOTE — CARE PLAN
RN: pt gone to eye appointment via wheel chair with transport, sling left under pt in reclining wheel chair. Pt is lift transfer with 2 assist.  Pt using prn pain control meds this morning and effective pain control to neck and left hip. Usually eats one meal 7-3 shift. Surgery scheduled for 5/26 and preop orders are placed. 1550 pt returned from appointment and next appointment in 3 months 8/18/23 at 1335.    Patient's most recent vital signs are:     Vital signs:  BP: 144/67  Temp: 96.9  HR: 77  RR: 18  SpO2: 95 %     Patient does not have new respiratory symptoms.  Patient does not have new sore throat.  Patient does not have a fever greater than 99.5.

## 2023-05-20 NOTE — CARE PLAN
Pt is A & O x4. Denies SOB or CP. Able to make needs known. Continent of B/B. No acute change in patient's condition this shift. Appears to be sleeping most of the shift. Call light in reach, continue POC          Patient's most recent vital signs are:     Vital signs:  BP: 126/68  Temp: 95.5  HR: 77  RR: 18  SpO2: 95 %     Patient does not have new respiratory symptoms.  Patient does not have new sore throat.  Patient does not have a fever greater than 99.5.

## 2023-05-20 NOTE — PROGRESS NOTES
Pt alert and oriented X4. Able to make needs known. Denied SOB, CP, N/V. PRN tylenol, roboxan, and tramadol X1 earlier in shift. Refused antifungal to caridad area.         Patient's most recent vital signs are:     Vital signs:  BP: 140/71  Temp: 95.9  HR: 71  RR: 16  SpO2: 96 %     Patient does not have new respiratory symptoms.  Patient does not have new sore throat.  Patient does not have a fever greater than 99.5.

## 2023-05-20 NOTE — PLAN OF CARE
Goal Outcome Evaluation:  Patient arrive from his eye appointment via wheelchair, transferred to bed with liko lift A2, weights done and was able to check his bottom.Sleep in between cares . Ate with good appetite but little frustrated of the food, saying that kitchen messed up with his order. Offered for some snacks, refused. Asked for his PRN tramadol, Robaxin and tylenol. Urinal is at bedside. Call light with in reach.    Blood pressure 126/68, pulse 77, temperature (!) 95.5  F (35.3  C), temperature source Oral, resp. rate 18, weight 110.4 kg (243 lb 4.8 oz), SpO2 95 %.

## 2023-05-21 NOTE — CARE PLAN
Pt is A & O x4. Denies SOB or CP. Able to make needs known. Continent of B/B. No acute change in patient's condition this shift. Appears to be sleeping most of the shift. Call light in reach, continue POC          Patient's most recent vital signs are:     Vital signs:  BP: 140/71  Temp: 95.9  HR: 71  RR: 16  SpO2: 96 %     Patient does not have new respiratory symptoms.  Patient does not have new sore throat.  Patient does not have a fever greater than 99.5.

## 2023-05-22 NOTE — PLAN OF CARE
Patient is alert and oriented x4. Able to make needs known to staff. Patient is continent of bowel and bladder. Urinal within reach at bedside. Liko lift for transfers. Declined full skin assessment. Pain managed with PRN tramadol and acetaminophen x1. Continue with POC.    Patient's most recent vital signs are:     Vital signs:  BP: 123/65  Temp: 96.3  HR: 75  RR: 17  SpO2: 95 %     Patient does not have new respiratory symptoms.  Patient does not have new sore throat.  Patient does not have a fever greater than 99.5.

## 2023-05-22 NOTE — PLAN OF CARE
Goal Outcome Evaluation:       Patient on bed did not get up on his recliner. Refused to have his shower. A2 with lift. Can make needs known. Call light with in reach.    Blood pressure 122/69, pulse 76, temperature 96.9  F (36.1  C), temperature source Oral, resp. rate 18, weight 110.4 kg (243 lb 4.8 oz), SpO2 98 %.

## 2023-05-22 NOTE — PLAN OF CARE
Goal Outcome Evaluation:    FOCUS/GOAL    Bowel management, Bladder management, Medication management, Pain management, Skin integrity and Safety management    ASSESSMENT, INTERVENTIONS AND CONTINUING PLAN FOR GOAL:    Pt is A&OX4, calm, & cooperative with care. Denied CP, SOB, & n/v. A of 2 with Liko lift for transfer; was not OOB this shift. NWB to the LLE. Continent for both B&B; uses bedpan for BM. Urinal at the bedside for bladder. Takes med whole with thin liquid. Pt slept well throughout the night & no acute issue. Able to make needs known & call light within reach. Will continue with plan of care.    Patient's most recent vital signs are:     Vital signs:  BP: 122/69  Temp: 96.9  HR: 76  RR: 18  SpO2: 98 %     Patient does not have new respiratory symptoms.  Patient does not have new sore throat.  Patient does not have a fever greater than 99.5.

## 2023-05-23 NOTE — PHARMACY-MEDICATION REGIMEN REVIEW
Pharmacy Medication Regimen Review  Waldo Bustos is a 72 year old male who is currently in the Transitional Care Unit.    Assessment: All medications have an appropriate indications, durations and no unnecessary use was found    Plan:   1. Continue current medications as ordered.     Attending provider will be sent this note for review.  If there are any emergent issues noted above, pharmacist will contact provider directly by phone.      Pharmacy will periodically review the resident's medication regimen for any PRN medications not administered in > 72 hours and discontinue them. The pharmacist will discuss gradual dose reductions of psychopharmacologic medications with interdisciplinary team on a regular basis.    Please contact pharmacy if the above does not answer specific medication questions/concerns.    Background:  A pharmacist has reviewed all medications and pertinent medical history today.  Medications were reviewed for appropriate use and any irregularities found are listed with recommendations.      Current Facility-Administered Medications:      acetaminophen (TYLENOL) tablet 1,000 mg, 1,000 mg, Oral, Q8H PRN, Liliya Lai PA, 1,000 mg at 05/23/23 0929     atorvastatin (LIPITOR) tablet 40 mg, 40 mg, Oral, Daily, Liliya Lai PA, 40 mg at 05/23/23 0923     calcium carbonate (TUMS) chewable tablet 500 mg, 500 mg, Oral, TID PRN, Liliya Lai PA     glucose gel 15-30 g, 15-30 g, Oral, Q15 Min PRN **OR** dextrose 50 % injection 25-50 mL, 25-50 mL, Intravenous, Q15 Min PRN **OR** glucagon injection 1 mg, 1 mg, Subcutaneous, Q15 Min PRN, Liliya Lai PA     liraglutide (VICTOZA) injection 1.8 mg, 1.8 mg, Subcutaneous, Daily, Liliya Lai PA, 1.8 mg at 05/23/23 0923     metFORMIN (GLUCOPHAGE XR) 24 hr tablet 2,000 mg, 2,000 mg, Oral, QAM, Liliya Lai PA, 2,000 mg at 05/23/23 0923     methocarbamol (ROBAXIN) tablet 500 mg, 500 mg, Oral, 4x Daily PRN,  Liliya Lai PA, 500 mg at 05/23/23 0929     miconazole (MICATIN) 2 % powder, , Topical, BID, Liliya Lai PA, Given at 05/18/23 0838     naloxone (NARCAN) injection 0.2 mg, 0.2 mg, Intravenous, Q2 Min PRN **OR** naloxone (NARCAN) injection 0.4 mg, 0.4 mg, Intravenous, Q2 Min PRN **OR** naloxone (NARCAN) injection 0.2 mg, 0.2 mg, Intramuscular, Q2 Min PRN **OR** naloxone (NARCAN) injection 0.4 mg, 0.4 mg, Intramuscular, Q2 Min PRN, Liliya Lai PA     Nurse may request from Pharmacy a change of form of medication (e.g. Liquid to tablet)., , Does not apply, Continuous PRN, Liliya Lai PA     ondansetron (ZOFRAN ODT) ODT tab 4 mg, 4 mg, Oral, Q6H PRN, Liliya Lai PA     Patient is already receiving anticoagulation with heparin, enoxaparin (LOVENOX), warfarin (COUMADIN)  or other anticoagulant medication, , Does not apply, Continuous PRN, Liliya Lai PA     pioglitazone (ACTOS) tablet 45 mg, 45 mg, Oral, Daily, Liliya Lai PA, 45 mg at 05/23/23 0923     polyethylene glycol (MIRALAX) Packet 17 g, 17 g, Oral, Daily PRN, Liliya Lai PA     senna-docusate (SENOKOT-S/PERICOLACE) 8.6-50 MG per tablet 2 tablet, 2 tablet, Oral, BID PRN, Liliya Lai PA     traMADol (ULTRAM) half-tab 25 mg, 25 mg, Oral, Q6H PRN, Liliya Lai PA, 25 mg at 05/23/23 0929  No current outpatient prescriptions on file.   PMH: morbid obesity, DM II, HLD, JAIR, PE/DVT on chronic apixaban, venous insufficiency, osteoarthritis, and chronic left hip prosthetic joint infection     Julio Howard, MariamaD

## 2023-05-23 NOTE — PLAN OF CARE
Goal Outcome Evaluation:      Plan of Care Reviewed With: patient    Overall Patient Progress: no change    Pt has been sleeping with no c/o pain or discomfort throughout the shift. Had previously refused offer of assistance with repositioning despite PUP education. Continue to offer assistance  to reposition in bed  specially that he tends to lean more on his right ( not new) but pt denied needing  help and claimed he is able and has been shifting weight while in bed  Using urinal in bed, staff empties. Has not been using his CPAP when sleeping- denies need for O2 supplement. Anticoagulant on HOLD starting 5/23 for  L BELLA revision surgery on 5/26- to start PCD/SCD (machine and sleeve in the supply room) for DVT prophylaxis today. Pt has no c/o SOB and no s/s of respiratory issue noted. Appear to be sleeping/resting on/off. Still here pending. Continue with plan of care.    Patient's most recent vital signs are:     Vital signs:  BP: 136/73  Temp: 96.4  HR: 72  RR: 18  SpO2: 97 %     Patient does not have new respiratory symptoms.  Patient does not have new sore throat.  Patient does not have a fever greater than 99.5.

## 2023-05-23 NOTE — CARE PLAN
RN: Declined skin check and up in chair this shift. Pain meds prn given this morning for neck pain and effective pain control.  Surgery scheduled for 5/26.    Patient's most recent vital signs are:     Vital signs:  BP: 145/62  Temp: 97  HR: 96  RR: 18  SpO2: 95 %     Patient does not have new respiratory symptoms.  Patient does not have new sore throat.  Patient does not have a fever greater than 99.5.

## 2023-05-23 NOTE — PROGRESS NOTES
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 Hospital course was complicated by a toxic encephalopathy, acute hypoxic respiratory failure due to obesity hypoventilation syndrome,  acute blood loss anemia,  HALEY, and profound physical deconditioning. He was transferred to TCU on 12/24/22 for rehabilitation. He is no longer progressing with therapies due to both pain and lack of motivation and unwillingness to move with therapy. He is planned for surgery on Friday of this week and is wanting to restart therapies post operatively.     # Severe weakness and debility  # Chronic Left Hip PJI s/p explantation, debridement and reconstruction with antibiotic spacer (11/22/22)  He is followed by Dr. Meyers of Orthopedic Surgery and completed an antibiotic course (1/3/23). He underwent biopsy under anesthesia (4/7/23) with no evidence of recurrent or persistent infection so will be proceeding with left BELLA reimplantation surgery on 5/26. Has lost a lot of strength and muscle mass with bedbound status. PT/OT no longer following d/t lack of progress and intermittent refusals.     - Remains NWB LLE.   - He will discharge on Friday to the OR (5/26/23) for BELLA reimplantation w/ Dr. Meyers. Note:   - Apixaban held after Monday (5/22/23) in preparation for surgery. This can be restarted post operatively when felt safe per surgical team  - Continue to encourage up to chair.      # Depression vs. Adjustment Disorder with Depressed Mood  Depressed mood in setting of ongoing health issues. Passive suicidal  statements to nursing staff in the past. Not interested in medication therapy or psychiatry consultation.   - Health psychology was not available to follow here.      # Diabetes mellitus type 2 (A1c 5.8% 3/6/23)  PTA glipizide discontinued d/t hypoglycemia risk.   - Continue sliding scale insulin Aspart BID, metformin and Actos.   - Continue Victoza (sub for PTA Trulicity).   - Hold diabetic meds morning of surgery (5/26/23)     # Hyperlipidemia  -Continue statin     # Obstructive sleep apnea  -Uses CPAP at night     # Unprovoked DVT in L common femoral, deep and superficial femoral, popliteal, posterior tibial veins and PE (2018) on chronic anticoagulation  - Apixaban now on hold in preparation for surgery. Continue to encourage him to get up in the chair as much as possible.       Diet: Regular Diet Adult  Snacks/Supplements Adult: Other; Please allow pt/RN to order snacks/supplements PRN; Between Meals  NPO per Anesthesia Guidelines for Procedure/Surgery Except for: Meds    DVT Prophylaxis: Mechanical (apixaban on HOLD)  Tran Catheter: Not present  Lines: None     Cardiac Monitoring: None  Code Status: Full Code      Disposition Plan  to OR on Friday 5/26/23          MIRANDA Vazquez  Hospitalist Service  [facility]  Securely message with [employer] (more info)  Text page via [employer] Paging/Directory   ______________________________________________________________________    Interval History   Mr. Bustos was resting in bed reporting that he is looking forward to his surgery at the end of the week and a chance to rehab again. He denies any chest pain, shortness of breath, fevers, chills, new rashes or sores.     Physical Exam   Vital Signs: Temp: 97  F (36.1  C) Temp src: Oral BP: (!) 145/62 Pulse: 96   Resp: 18 SpO2: 95 % O2 Device: None (Room air)    Weight: 243 lbs 4.8 oz    General:72  Year old morbidly obese gentleman resting in bed,no acute distress, denies pain at rest  HEENT:  NCAT, anicteric sclera, EOMI, mucous membranes pink and moist  Cardiovascular: Regular rate and rhythm, no appreciable murmurs, rubs or gallops  Lungs: breathing comfortably on room air, no adventitious sounds to bilateral auscultation  Abdomen: obese abdomen, tinkering bowel sounds present, soft, non-tender to palpation throughout  Vascular: 1+ pretibial pitting edema bilaterally  Skin: intertriginous erythema in groin region    Data   Recent Labs   Lab 05/23/23  0806 05/22/23  0731 05/21/23  1813   GLC 88 89 88

## 2023-05-23 NOTE — PLAN OF CARE
Goal Outcome Evaluation:      Plan of Care Reviewed With: patient    Overall Patient Progress: declining    Outcome Evaluation: Pt with continued poor appetite/intakes. Explained importance of nutrition pre-op.

## 2023-05-23 NOTE — PROGRESS NOTES
SW received email from nephew.  Tomorrow a tow truck will get the 4 vehicles.  Pt and girlfriend never told nephew there was auto withdrawals. Pt is over drawn.  If things don't get worked out by next week, nephew will get  involved.     5/22  SW received a call from pt. Pt doesn t understand why SW can help him go to another TCU before surgery as he doesn t want to come back here after surgery.  SW told him to tell hospital SWers after surgery to find another placement for pt. Pt wanted SW to do that now.  SW said that is not how it works.  Pt might be able to go home after hip surgery.  Pt knew that would not happen.       NEREYDA Sharp   [facility], Transitional Care Unit   Social Work   [address] [place] [address], [address] [place] [address]  () [phone]

## 2023-05-23 NOTE — PROGRESS NOTES
CLINICAL NUTRITION SERVICES - REASSESSMENT NOTE     Nutrition Prescription    RECOMMENDATIONS FOR MDs/PROVIDERS TO ORDER:  Encourage oral intakes, provide snacks/supplements PRN    Malnutrition Status:    Severe malnutrition in the context of acute on chronic illness    Recommendations already ordered by Registered Dietitian (RD):  Encouraged intakes, discussed importance of nutrition pre-op    Future/Additional Recommendations:  Monitor labs, intakes, and weight trends.     EVALUATION OF THE PROGRESS TOWARD GOALS   Diet: Regular  Intake/[place]: 100% of documented meals, poorly documented    Pt ordering one meal daily and on average ordering 738 kcal and 24 g protein per day per [employer].     Pt is likely meeting <50% minimum energy and protein needs.     NEW FINDINGS/REVIEW OF SYSTEMS    Nutrition/GI: RD met with pt at bedside. Discussed concerns for pt's intakes and weight loss over last month. Encouraged pt to have at least two meals a day. Pt states he has 1.5 meals daily. Still with poor appetite which pt attributes to lack of physical activity.     Weights: Pt with previous 63 lb (19%) weight loss over 1 month. Pt weight stable for 4 months, now with With 18 lb (7%) weight loss over 1 month, overall 82.5 lb (25%) weight loss in 6 months.    Wt Readings from Last Encounters:   05/19/23 110.4 kg (243 lb 4.8 oz)   05/18/23 110.1 kg (242 lb 12.8 oz)   05/17/23 109.7 kg (241 lb 12.8 oz)   04/10/23 118 kg (260 lb 1.6 oz)   03/23/23 117 kg (257 lb 14.4 oz)   01/09/23 119.7 kg (264 lb)   12/30/22 118.5 kg (261 lb 3.2 oz)   11/22/22 147.1 kg (324 lb 4.8 oz)   11/14/22 142.9 kg (315 lb)   11/09/22 146.8 kg (323 lb 9.6 oz)   05/19/22 136.1 kg (300 lb)   07/03/18 148.9 kg (328 lb 4.8 oz)      Labs and meds reviewed    MALNUTRITION  % Intake: < 75% for >/= 1 month (moderate)  % Weight Loss: > 10% in 6 months (severe)  Subcutaneous Fat Loss: Upper arm: moderate-severe  Muscle Loss: Global moderate -severe  Fluid  Accumulation/Edema: Mild  Malnutrition Diagnosis: Severe malnutrition in the context of acute on chronic illness     Previous Goals  Patient to consume % of nutritionally adequate meal trays TID, or the equivalent with supplements/snacks.  Evaluation: Not met    Previous Nutrition Diagnosis  Inadequate oral intake related to poor appetite, dislike of menu items as evidenced by patient report, pt ordering < 50% of needs daily.     Evaluation: No change     CURRENT NUTRITION DIAGNOSIS  Inadequate oral intake related to poor appetite, dislike of menu items as evidenced by patient report, pt ordering < 50% of needs daily.     INTERVENTIONS  Implementation  Nutrition education for nutrition relationship to health/disease    Goals  Patient to consume % of nutritionally adequate meal trays TID, or the equivalent with supplements/snacks.     Monitoring/Evaluation  Progress toward goals will be monitored and evaluated per protocol.    Dee Alanis MS, RDN, LDN  RD pager: [phone]   Weekend/Holiday Pager: [phone]

## 2023-05-23 NOTE — PLAN OF CARE
Goal Outcome Evaluation:         Pt alert and oriented x4, able to make needs known. Stayed in bed all this shift. Last dose of Eliquis given, scheduled for hip surgery on 5/26. Observed sleeping in bed most of the PM. New bed frame delivered at 2000H. Pt ate 100% of dinner. VSS. Placed call light within reached. Will continue with POC.               Patient's most recent vital signs are:     Vital signs:  BP: 136/73  Temp: 96.4  HR: 72  RR: 18  SpO2: 97 %     Patient does not have new respiratory symptoms.  Patient does not have new sore throat.  Patient does not have a fever greater than 99.5.

## 2023-05-24 NOTE — PLAN OF CARE
Goal Outcome Evaluation:          Patient on bed did not get up on his recliner.  A2 with lift.Had medium BM during the shift.Ate 85% of his food ordered from outside.New redness and excoriations noted on bottom.Applied barrier cream. Can make needs known. Call light with in reach.       Blood pressure (!) 151/66, pulse 96, temperature 97.3  F (36.3  C), temperature source Oral, resp. rate 18, weight 110.4 kg (243 lb 4.8 oz), SpO2 92 %.

## 2023-05-24 NOTE — CARE PLAN
RN: Declines OOB and skin check. Using prn pain meds this morning and effective control for neck pain. 5/26 surgery for left hip. Lift transfer and NWB LLE.    Patient's most recent vital signs are:     Vital signs:  BP: 123/64  Temp: 95.8  HR: 80  RR: 18  SpO2: 96 %     Patient does not have new respiratory symptoms.  Patient does not have new sore throat.  Patient does not have a fever greater than 99.5.

## 2023-05-24 NOTE — PLAN OF CARE
"Goal Outcome Evaluation:      Plan of Care Reviewed With: patient    Overall Patient Progress: no change    Pt was awake at midnight. Discussed with pt order for SCD/PCD as mechanical prophylaxis for DVT since Eloquis is on hold  D/t upcoming surgery on 5/26/23. Pt immediately says \" I don't think that would work\".  RN encouraged pt to give it a try as the benefit of using it is huge. Pt agreed to it a try and made no complain with the whole night. Had previously refused offer of assistance with repositioning despite PUP education. Continue to offer assistance  to reposition in bed  specially that he tends to lean more on his right ( not new) but pt denied needing  help and claimed he is able and has been shifting weight while in bed  Using urinal in bed, staff empties. Has not been using his CPAP when sleeping- denies need for O2 supplement.Pt has no c/o SOB and no s/s of respiratory issue noted at RA. Appear to be sleeping/resting between cares/ meds. Continue with plan of care.    Patient's most recent vital signs are:     Vital signs:  BP: 151/66  Temp: 97.3  HR: 96  RR: 18  SpO2: 92 %     Patient does not have new respiratory symptoms.  Patient does not have new sore throat.  Patient does not have a fever greater than 99.5.        "

## 2023-05-25 NOTE — PLAN OF CARE
Pt is A&Ox4. He requested pain medication once this shift. Pt was up most of the night and slept most of this shift. Pt did not order any meals. He did not get up in the recliner. Pt has PCD's on BLE. Pt was continent of urine using urinal that staff empties. No stool today. Pt discharging tomorrow for surgery.       Patient's most recent vital signs are:     Vital signs:  BP: 128/61  Temp: 95.3  HR: 73  RR: 16  SpO2: 93 %     Patient does not have new respiratory symptoms.  Patient does not have new sore throat.  Patient does not have a fever greater than 99.5.

## 2023-05-25 NOTE — DISCHARGE SUMMARY
[facility] Transitional Care  Hospitalist Discharge Summary      Date of Admission:  4/7/2023  Date of Discharge:  5/26/23  Discharging Provider: MIRANDA Vazquez  Discharge Service: Hospitalist Service    Discharge Diagnoses   # Severe weakness and debility  # Chronic Left Hip PJI s/p explantation, debridement and reconstruction with antibiotic spacer (11/22/22)  # Depression vs. Adjustment Disorder with Depressed Mood  # Diabetes mellitus type 2 (A1c 5.8% 3/6/23)  # Hyperlipidemia  # Obstructive sleep apnea  # Unprovoked DVT in L common femoral, deep and superficial femoral, popliteal, posterior tibial veins and PE (2018) on chronic anticoagulation    Clinically Significant Risk Factors     # Severe Malnutrition: based on nutrition assessment      Follow-ups Needed After Discharge   Please follow up with PT/OT and your surgical team upon admission to the hospital.    Unresulted Labs Ordered in the Past 30 Days of this Admission     No orders found from 3/8/2023 to 4/8/2023.          Discharge Disposition   Transferred to Hospital for surgery  Condition at discharge: Stable    Hospital Course   Waldo Bustos is a 72 year old male with a history of morbid obesity, DM2, HLD, JAIR, PE/DVT on chronic apixaban, venous insufficiency, OA, and chronic L hip prosthetic joint infection who was admitted to [facility] 11/22/22 for scheduled explantation of left hip prosthesis, debridement, and reconstruction with antibiotic cement spacer. Hospital course was complicated by a toxic encephalopathy, acute hypoxic respiratory failure due to obesity hypoventilation syndrome,  acute blood loss anemia,  HALEY, and profound physical deconditioning. He was transferred to TCU on 12/24/22 for rehabilitation. He is no longer progressing with therapies due to both pain and lack of motivation and unwillingness to move with therapy. He is discharging to the hospital for surgery on Friday, 5/26/23. He will reinitiate therapy  post operatively and decide if he wants to return to TCU versus a different skilled rehab center for ongoing therapy.    # Severe weakness and debility  # Chronic Left Hip PJI s/p explantation, debridement and reconstruction with antibiotic spacer (11/22/22)  He is followed by Dr. Meyers of Orthopedic Surgery and completed an antibiotic course (1/3/23). He underwent biopsy under anesthesia (4/7/23) with no evidence of recurrent or persistent infection so will be proceeding with left BELLA reimplantation surgery on 5/26. Has lost a lot of strength and muscle mass with bedbound status. PT/OT no longer following d/t lack of progress and intermittent refusals.      - Remains NWB LLE.   - He will discharge on Friday to the OR (5/26/23) for EBLLA reimplantation w/ Dr. Meyers. Note:   - Apixaban held after Monday (5/22/23) in preparation for surgery. This can be restarted post operatively when felt safe per surgical team  - Continue to encourage up to chair.      # Depression vs. Adjustment Disorder with Depressed Mood  Depressed mood in setting of ongoing health issues. Passive suicidal statements to nursing staff in the past. Not interested in medication therapy or psychiatry consultation.   - Health psychology was not available to follow here. Consider reinitiating post discharge when Health Psychology is able to connect with him.     # Diabetes mellitus type 2 (A1c 5.8% 3/6/23)  PTA glipizide discontinued d/t hypoglycemia risk.   - Mr. Segura's diabetic medications (Aspart sliding scale insulin QID, Metformin, Actos and Victoza) were HELD the morning of surgery and should be restarted post operatively when deemed appropriate per the surgery and internal medicine teams.      # Hyperlipidemia  -Continue Lipitor 40mg once daily     # Obstructive sleep apnea  -Uses CPAP at night     # Unprovoked DVT in L common femoral, deep and superficial femoral, popliteal, posterior tibial veins and PE (2018) on chronic anticoagulation  -  Apixaban now on hold in preparation for surgery. Continue to encourage him to get up in the chair as much as possible.          Consultations This Hospital Stay   WOUND OSTOMY CONTINENCE NURSE  IP CONSULT  PSYCHOLOGY ADULT IP CONSULT  PODIATRY IP CONSULT    Code Status   Full Code    Time Spent on this Encounter   ISasha PA, personally saw the patient today and spent greater than 30 minutes discharging this patient.       MIRANDA Vazquez  [facility] TRANSITIONAL CARE [address] [address]  Phone: [phone]  ______________________________________________________________________    Physical Exam   Vital Signs: Temp: (!) 95.3  F (35.2  C) Temp src: Oral BP: 128/61 Pulse: 73   Resp: 16 SpO2: 93 % O2 Device: None (Room air)    Weight: 243 lbs 4.8 oz  General:72  Year old morbidly obese gentleman resting in bed,no acute distress, denies pain at rest  HEENT: NCAT, anicteric sclera, EOMI, mucous membranes pink and moist  Cardiovascular: Regular rate and rhythm, no appreciable murmurs, rubs or gallops  Lungs: breathing comfortably on room air, no adventitious sounds to bilateral auscultation  Abdomen: obese abdomen, tinkering bowel sounds present, soft, non-tender to palpation throughout  Vascular: 1+ pretibial pitting edema bilaterally  Skin: intertriginous erythema in groin region       Primary Care Physician   YUE MEDLEY    Discharge Orders   No discharge procedures on file.    Significant Results and Procedures   Most Recent 3 CBC's:Recent Labs   Lab Test 05/25/23  0644 05/15/23  0526 03/30/23  1054   WBC 9.6 8.7 8.5   HGB 10.4* 10.4* 11.0*   MCV 95 95 94    312 307     Most Recent 3 BMP's:Recent Labs   Lab Test 05/25/23  0750 05/25/23  0644 05/24/23  2059 05/15/23  0856 05/15/23  0526 05/08/23  0753 05/08/23  0731   NA  --  138  --   --  137  --  140   POTASSIUM  --  4.1  --   --  4.5  --  4.4   CHLORIDE  --  104  --   --  102  --   105   CO2  --  25  --   --  24  --  24   BUN  --  32.2*  --   --  31.5*  --  35.3*   CR  --  1.24*  --   --  0.96  --  1.13   ANIONGAP  --  9  --   --  11  --  11   MAIKOL  --  8.7*  --   --  9.2  --  9.0   GLC 82 92 152*   < > 81   < > 83    < > = values in this interval not displayed.     Most Recent 2 LFT's:Recent Labs   Lab Test 03/30/23  1054 12/04/22  1035   AST 12 7   ALT 13 9   ALKPHOS 80 74   BILITOTAL 0.2 0.3       Discharge Medications   Current Discharge Medication List      CONTINUE these medications which have NOT CHANGED    Details   acetaminophen (TYLENOL) 325 MG tablet Take 2 tablets (650 mg) by mouth every 4 hours as needed for other (For optimal non-opioid multimodal pain management to improve pain control.)    Associated Diagnoses: Prosthetic joint infection, initial encounter (H)      !! apixaban ANTICOAGULANT (ELIQUIS) 2.5 MG tablet Take 1 tablet (2.5 mg) by mouth 2 times daily    Associated Diagnoses: Prosthetic joint infection, initial encounter (H)      !! apixaban ANTICOAGULANT (ELIQUIS) 5 MG tablet Take 1 tablet (5 mg) by mouth 2 times daily    Associated Diagnoses: Prosthetic joint infection, initial encounter (H)      atorvastatin (LIPITOR) 40 MG tablet Take 40 mg by mouth daily      BD CEM U/F 32G X 4 MM insulin pen needle       blood glucose (ACCU-CHEK DEDRICK PLUS) test strip TEST BLOOD SUGARS 3 TIMES A DAY. E11.622 SKIN ULCER, HIGH A1C      blood glucose monitoring (NO BRAND SPECIFIED) meter device kit       ciprofloxacin (CIPRO) 500 MG tablet Take 1 tablet (500 mg) by mouth every 12 hours    Associated Diagnoses: Prosthetic joint infection, initial encounter (H)      COMPRESSION STOCKINGS For personal use. Length:calf FARROW WRAPS BILATERALLY      furosemide (LASIX) 20 MG tablet Take 20 mg by mouth daily      glipiZIDE (GLUCOTROL XL) 10 MG 24 hr tablet Take 10 mg by mouth daily      lactobacillus rhamnosus, GG, (CULTURELL) capsule Take 1 capsule by mouth 2 times daily    Associated  Diagnoses: Iliopsoas bursitis of left hip      metFORMIN (GLUCOPHAGE XR) 500 MG 24 hr tablet Take 2,000 mg by mouth every morning      methocarbamol (ROBAXIN) 500 MG tablet Take 1 tablet (500 mg) by mouth every 6 hours as needed for muscle spasms    Associated Diagnoses: Prosthetic joint infection, initial encounter (H)      metroNIDAZOLE (FLAGYL) 500 MG tablet Take 1 tablet (500 mg) by mouth 3 times daily    Associated Diagnoses: Prosthetic joint infection, initial encounter (H)      miconazole (MICATIN) 2 % external powder Apply topically 2 times daily    Associated Diagnoses: Fungal infection      Misc. Devices (WALKER AUTO GLIDES) MISC 2 Wheeled Walker for home use. For 6 weeks.      pioglitazone (ACTOS) 45 MG tablet Take 45 mg by mouth daily      polyethylene glycol (MIRALAX) 17 g packet Take 17 g by mouth daily    Associated Diagnoses: Prosthetic joint infection, initial encounter (H)      senna-docusate (SENOKOT-S/PERICOLACE) 8.6-50 MG tablet Take 2 tablets by mouth 2 times daily    Associated Diagnoses: Prosthetic joint infection, initial encounter (H)      traMADol (ULTRAM) 50 MG tablet Take 50 mg by mouth every 6 hours as needed for severe pain      VICTOZA PEN 18 MG/3ML soln Inject 1.8 mg Subcutaneous daily       !! - Potential duplicate medications found. Please discuss with provider.      STOP taking these medications       oxyCODONE (ROXICODONE) 5 MG tablet Comments:   Reason for Stopping:             Allergies   Allergies   Allergen Reactions     Sulfa Antibiotics      Other reaction(s): *Unknown - Childhood Rxn     Tizanidine Other (See Comments)     Frequent urination; causes drowsiness, and dry mouth

## 2023-05-25 NOTE — PLAN OF CARE
Goal Outcome Evaluation:  No acute issues overnight. Has no c/o's pain, discomfort, CP/SOB or any other c/o's during shift. PCDs to BLES. Continent B&B using urinal indep.and bedpan in bed using liko lift. NWB LLE. Surgery tomorrow.         Patient's most recent vital signs are:     Vital signs:  BP: 123/64  Temp: 95.8  HR: 80  RR: 18  SpO2: 96 %     Patient does not have new respiratory symptoms.  Patient does not have new sore throat.  Patient does not have a fever greater than 99.5.

## 2023-05-25 NOTE — PLAN OF CARE
Goal Outcome Evaluation:    FOCUS/GOAL    Bowel management, Bladder management, Medication management, Pain management, Mobility, Skin integrity and Safety management    ASSESSMENT, INTERVENTIONS AND CONTINUING PLAN FOR GOAL:    Pt is A&OX4, calm, & cooperative with care. Denied CP, SOB, & n/v. VSS & on RA. A of 2 with Liko lift for transfer; was not OOB this shift. NWB to the LLE. Continent for both B&B; uses bedpan for BM. Urinal at the bedside for bladder. No BM this shift. Takes med whole with thin liquid. Managed neck pain with PRN tylenol, robaxin, & tramadol. Pt ate late lunch from [employer] for dinner & no acute issue. Able to make needs known & call light within reach. Will continue with plan of care.    Patient's most recent vital signs are:     Vital signs:  BP: 123/64  Temp: 95.8  HR: 80  RR: 18  SpO2: 96 %     Patient does not have new respiratory symptoms.  Patient does not have new sore throat.  Patient does not have a fever greater than 99.5.

## 2023-05-25 NOTE — PROGRESS NOTES
MDS Pain Assessment    The following is the pain interview as conducted by the TCU RN caring for the patient on May / 25 / 2023. This assessment is required by the [facility] for all patients in [place] SNF (Skilled Nursing Facilities).       1. Have you had pain or hurting at any time in the last 5 days? Yes    2. How much of the time have you experienced pain or hurting over the last 5 days? frequently    3. Over the past 5 days, has pain made it hard for you to sleep at night? No    4. Over the past 5 days, have you limited your day-to-day activities because of pain? Yes    5. Pain intensity (Numeric scale used first-if patient unable to answer, verbal scale to be used.)    Numeric Scale: Please rate your worst pain over the last 5 days on a zero to ten scale, with zero being no pain and ten being the worst pain you can imagine.     8    Verbal Scale: USED ONLY if unable to give numeric, otherwise N/A  Please rate the intensity of your worst pain over the last 5 days.

## 2023-05-25 NOTE — CONSULTS
Assessment: Rahat is a 73 YO with PVD and elongated nails.    Plan:  - Pt seen and evaluated.  - Nails trimmed x 10.  - Will s/o. Please reconsult with any questions.    HPI: Rahat is a 73 YO with elongated nails. No pain in them. Getting hip sx tomorrow.     Past Medical History:   Diagnosis Date     Arthritis 5 years ago     Chronic osteoarthritis Not sure     Diabetes (H) 10-12 years ago     DVT (deep venous thrombosis) (H)      Dyslipidemia      Gastroesophageal reflux disease      History of blood transfusion      Iliopsoas abscess (H)      Infection of prosthetic hip joint (H)      JAIR (obstructive sleep apnea)      Pulmonary embolism (H)      RA (rheumatoid arthritis) (H) Not sure     Reduced vision 2-3 years ago    Cataract Surgery on both eyes     Venous insufficiency      Past Surgical History:   Procedure Laterality Date     ASPIRATION NEEDLE HIP Left 4/7/2023    Procedure: ARTHROCENTESIS, LEFT HIP;  Surgeon: Rom Meyers MD;  [facility]     Cataract       COLONOSCOPY       EGD       IR FINE NEEDLE ASPIRATION W ULTRASOUND  3/6/2023     IR IVC FILTER PLACEMENT      removed     IRRIGATION AND DEBRIDEMENT HIP, PLACE ANTIBIOTIC CEMENT BEADS / SPACER Left 11/22/2022    Procedure: and Reconstruction Using G 20 Prosthetic Antibiotic Cement Spacer;  Surgeon: Rom Meyers MD;  [facility]     REMOVE HARDWARE ARTHROPLASTY HIP Left 11/22/2022    Procedure: Explantation of Chronic  Infected Left Total Hip Arthroplasty, Extended Trochanteric Osteotomy with Extensive Debridement;  Surgeon: Rom Meyers MD;  [facility] OR     SYNOVIAL BIOPSY HIP Left 4/7/2023    Procedure: BIOPSY, SYNOVIUM, LEFT  HIP, percutaneous;  Surgeon: Rom Meyers MD;  [facility] OR     [facility] PELVIS/HIP JOINT SURGERY UNLISTED       [facility] STOMACH SURGERY PROCEDURE UNLISTED  1954    2 Hernia surgeries as a child        Allergies   Allergen Reactions     Sulfa Antibiotics      Other reaction(s): *Unknown - Childhood Rxn      "Tizanidine Other (See Comments)     Frequent urination; causes drowsiness, and dry mouth       Social History     Socioeconomic History     Marital status:      Spouse name: Not on file     Number of children: Not on file     Years of education: Not on file     Highest education level: Not on file   Occupational History     Not on file   Tobacco Use     Smoking status: Never     Smokeless tobacco: Never   Vaping Use     Vaping status: Not on file   Substance and Sexual Activity     Alcohol use: No     Drug use: No     Sexual activity: Never   Other Topics Concern     Not on file   Social History Narrative     Not on file     Social Determinants of Health     Financial Resource Strain: Not on file   Food Insecurity: Not on file   Transportation Needs: Not on file   Physical Activity: Not on file   Stress: Not on file   Social Connections: Not on file   Intimate Partner Violence: Not on file   Housing Stability: Not on file     Objective:  Vital signs:  Temp: (!) 95.3  F (35.2  C) Temp src: Oral BP: 128/61 Pulse: 73   Resp: 16 SpO2: 93 % O2 Device: None (Room air)     Weight: 110.4 kg (243 lb 4.8 oz)  Estimated body mass index is 34.91 kg/m  as calculated from the following:    Height as of 1/9/23: 1.778 m (5' 10\").    Weight as of this encounter: 110.4 kg (243 lb 4.8 oz).      Lab Results   Component Value Date    WBC 9.6 05/25/2023     Lab Results   Component Value Date    RBC 3.43 05/25/2023     Lab Results   Component Value Date    HGB 10.4 05/25/2023     Lab Results   Component Value Date    HCT 32.5 05/25/2023     No components found for: MCT  Lab Results   Component Value Date    MCV 95 05/25/2023     Lab Results   Component Value Date    MCH 30.3 05/25/2023     Lab Results   Component Value Date    MCHC 32.0 05/25/2023     Lab Results   Component Value Date    RDW 14.0 05/25/2023     Lab Results   Component Value Date     05/25/2023     Last Comprehensive Metabolic Panel:  Lab Results "   Component Value Date     05/25/2023    POTASSIUM 4.1 05/25/2023    CHLORIDE 104 05/25/2023    CO2 25 05/25/2023    ANIONGAP 9 05/25/2023    GLC 82 05/25/2023    BUN 32.2 (H) 05/25/2023    CR 1.24 (H) 05/25/2023    GFRESTIMATED 62 05/25/2023    MAIKOL 8.7 (L) 05/25/2023       PE:  DP and PT pulses not palpable. CRT is instant.   Gross sensation intact.  Equinus BL.  Nails elongated x 10.

## 2023-05-26 PROBLEM — T84.50XS INFECTION OF PROSTHETIC JOINT, SEQUELA: Status: ACTIVE | Noted: 2023-01-01

## 2023-05-26 NOTE — LETTER
Transition Communication Hand-off for Care Transitions to Next Level of Care Provider    Name: Waldo Bustos  : 1950  MRN #: [id number]  Primary Care Provider: YUE MEDLEY     Primary Clinic: [address] [place] [address]     Reason for Hospitalization:  Infection of prosthetic joint, sequela [T84.50XS]  Admit Date/Time: 2023 10:50 AM  Discharge Date: 23  Payor Source: Payor: [facility] / Plan: [employer]AGE / Product Type: HMO /            Reason for Communication Hand-off Referral: Continuity of Care    Discharge Plan:  Pt [facility] to TCU today:    [facility]  [address]  [address]th [place] [address]  Admissions: [phone]  RN Station: [phone]     Concern for non-adherence with plan of care:   Y/N Yes  Discharge Needs Assessment:  Needs      Flowsheet Row Most Recent Value   Equipment Currently Used at Home wheelchair, manual, cane, straight, walker, rolling, wheelchair, power, grab bar, toilet, grab bar, tub/shower, shower chair   # of Referrals Placed by CM --  [None at this time.]            Already enrolled in Tele-monitoring program and name of program:  No  Follow-up specialty is recommended: Yes    Follow-up plan:    Future Appointments   Date Time Provider Department Center   6/3/2023  8:45 AM Aga Reddy OT [facility] [place] [facility]   2023 10:30 AM TR TC PT 1 [facility] [place] [facility]   2023 10:20 AM Rom Meyers MD [facility]       Any outstanding tests or procedures:        Referrals       Future Labs/Procedures    Occupational Therapy Adult Consult     Comments:    Evaluate and treat as clinically indicated.    Reason:  s/p hip surgery    Physical Therapy Adult Consult     Comments:    Evaluate and treat as clinically indicated.    Reason:  s/p hip surgery            Supplies       Future Labs/Procedures    [facility]     Process Instructions:    By signing this order, the Authorizing Provider is attesting that  they have completed a face-to-face evaluation on the patient to determine their need for this equipment in the last 60 days. A new face-to-face evaluation is required each time  A new prescription for one of the specified items is ordered.   If an additional provider completed the evaluation, please indicate their name below.     **As of 2018, an order requisition and face sheet will print for all DME orders. Please give printed order and face sheet to patient if not obtaining product from [facility] DME closet.     Comments:    DME Documentation: Describe the reason for need to support medical necessity: Impaired gait status post hip surgery. I, the undersigned, certify that the above prescribed supplies are medically necessary for this patient and is both reasonable and necessary in reference to accepted standards of medical practice in the treatment of this patient's condition and is not prescribed as a convenience.    [facility]     Process Instructions:    By signing this order, the Authorizing Provider is attesting that they have completed a face-to-face evaluation on the patient to determine their need for this equipment in the last 60 days. A new face-to-face evaluation is required each time  A new prescription for one of the specified items is ordered.   If an additional provider completed the evaluation, please indicate their name below.     **As of 2018, an order requisition and face sheet will print for all DME orders. Please give printed order and face sheet to patient if not obtaining product from [facility] DME closet.     Comments:    DME Documentation: Describe the reason for need to support medical necessity: Impaired gait status post hip surgery. I, the undersigned, certify that the above prescribed supplies are medically necessary for this patient and is both reasonable and necessary in reference to accepted standards of medical practice in the treatment of this patient's  condition and is not prescribed as a convenience.    [facility]     Process Instructions:    By signing this order, the Authorizing Provider is attesting that they have completed a face-to-face evaluation on the patient to determine their need for this equipment in the last 60 days. A new face-to-face evaluation is required each time  A new prescription for one of the specified items is ordered.   If an additional provider completed the evaluation, please indicate their name below.     **As of 2018, an order requisition and face sheet will print for all DME orders. Please give printed order and face sheet to patient if not obtaining product from [facility] DME closet.     Comments:    : DME Documentation: Describe the reason for need to support medical necessity: Impaired gait status post hip surgery. I, the undersigned, certify that the above prescribed supplies are medically necessary for this patient and is both reasonable and necessary in reference to accepted standards of medical practice in the treatment of this patient's condition and is not prescribed as a convenience.            Key Recommendations:      NEREYDA MONTES    AVS/Discharge Summary is the source of truth; this is a helpful guide for improved communication of patient story

## 2023-05-26 NOTE — ANESTHESIA PROCEDURE NOTES
Airway       Patient location during procedure: OR       Procedure Start/Stop Times: 5/26/2023 12:57 PM  Staff -        CRNA: Joan Millan APRN CRNA       Performed By: CRNA  Consent for Airway        Urgency: elective  Indications and Patient Condition       Indications for airway management: caridad-procedural       Induction type:intravenous       Mask difficulty assessment: 2 - vent by mask + OA or adjuvant +/- NMBA    Final Airway Details       Final airway type: endotracheal airway       Successful airway: ETT - single  Endotracheal Airway Details        ETT size (mm): 7.5       Cuffed: yes       Successful intubation technique: video laryngoscopy       VL Blade Size: MAC 3       Grade View of Cords: 1       Adjucts: stylet       Position: Left       Measured from: lips       Secured at (cm): 22       Bite block used: None    Post intubation assessment        Placement verified by: capnometry, equal breath sounds and chest rise        Number of attempts at approach: 1       Secured with: silk tape and other (comment) (tegaderm)       Ease of procedure: easy       Dentition: Unchanged    Medication(s) Administered   Medication Administration Time: 5/26/2023 12:57 PM

## 2023-05-26 NOTE — ANESTHESIA PREPROCEDURE EVALUATION
Anesthesia Pre-Procedure Evaluation    Patient: Waldo Bustos   MRN: [id number] : 1950        Procedure : Procedure(s):  Explantation of Left Hip Antibiotic Spacer  Revision Left Total Hip Arthroplasty          Past Medical History:   Diagnosis Date     Arthritis 5 years ago     Chronic osteoarthritis Not sure     Diabetes (H) 10-12 years ago     DVT (deep venous thrombosis) (H)      Dyslipidemia      Gastroesophageal reflux disease      History of blood transfusion      Iliopsoas abscess (H)      Infection of prosthetic hip joint (H)      JAIR (obstructive sleep apnea)      Pulmonary embolism (H)      RA (rheumatoid arthritis) (H) Not sure     Reduced vision 2-3 years ago    Cataract Surgery on both eyes     Venous insufficiency       Past Surgical History:   Procedure Laterality Date     ASPIRATION NEEDLE HIP Left 2023    Procedure: ARTHROCENTESIS, LEFT HIP;  Surgeon: Rom Meyers MD;  [facility]     Cataract       COLONOSCOPY       EGD       IR FINE NEEDLE ASPIRATION W ULTRASOUND  3/6/2023     IR IVC FILTER PLACEMENT      removed     IRRIGATION AND DEBRIDEMENT HIP, PLACE ANTIBIOTIC CEMENT BEADS / SPACER Left 2022    Procedure: and Reconstruction Using G 20 Prosthetic Antibiotic Cement Spacer;  Surgeon: Rom Meyers MD;  [facility]     REMOVE HARDWARE ARTHROPLASTY HIP Left 2022    Procedure: Explantation of Chronic  Infected Left Total Hip Arthroplasty, Extended Trochanteric Osteotomy with Extensive Debridement;  Surgeon: Rom Meyers MD;  [facility]     SYNOVIAL BIOPSY HIP Left 2023    Procedure: BIOPSY, SYNOVIUM, LEFT  HIP, percutaneous;  Surgeon: Rom Meyers MD;  [facility] OR     [facility] PELVIS/HIP JOINT SURGERY UNLISTED       [facility] STOMACH SURGERY PROCEDURE UNLISTED      2 Hernia surgeries as a child      Allergies   Allergen Reactions     Sulfa Antibiotics      Other reaction(s): *Unknown - Childhood Rxn     Tizanidine Other (See Comments)      Frequent urination; causes drowsiness, and dry mouth        Social History     Tobacco Use     Smoking status: Never     Smokeless tobacco: Never   Vaping Use     Vaping status: Not on file   Substance Use Topics     Alcohol use: No      Wt Readings from Last 1 Encounters:   05/19/23 110.4 kg (243 lb 4.8 oz)        Anesthesia Evaluation   Pt has had prior anesthetic. Type: General.        ROS/MED HX  ENT/Pulmonary:     (+) sleep apnea, uses CPAP,                                     Neurologic:  - neg neurologic ROS     Cardiovascular:     (+) Dyslipidemia hypertension- -   -  - -                                      METS/Exercise Tolerance:     Hematologic:     (+) History of blood clots (h/o DVT/PE- abixaban on hold since 5/22),     anemia,          Musculoskeletal:   (+)  arthritis,             GI/Hepatic:     (+) GERD,                   Renal/Genitourinary:  - neg Renal ROS     Endo:     (+)  type II DM,             Obesity (BMI 37),       Psychiatric/Substance Use:       Infectious Disease: Comment: H/o infected L hip prosthesis s/p explant and antibiotic spacer placement, has now completed full course of antibiotic treatment      Malignancy:  - neg malignancy ROS     Other:  - neg other ROS          Physical Exam    Airway        Mallampati: III   TM distance: > 3 FB   Neck ROM: full   Mouth opening: > 3 cm    Respiratory Devices and Support         Dental       (+) Multiple visibly decayed, broken teeth    B=Bridge, C=Chipped, L=Loose, M=Missing    Cardiovascular   cardiovascular exam normal          Pulmonary   pulmonary exam normal                OUTSIDE LABS:  CBC:   Lab Results   Component Value Date    WBC 9.5 05/26/2023    WBC 9.6 05/25/2023    HGB 10.5 (L) 05/26/2023    HGB 10.4 (L) 05/25/2023    HCT 32.9 (L) 05/26/2023    HCT 32.5 (L) 05/25/2023     05/26/2023     05/25/2023     BMP:   Lab Results   Component Value Date     05/26/2023     05/25/2023    POTASSIUM 4.4 05/26/2023     POTASSIUM 4.1 05/25/2023    CHLORIDE 105 05/26/2023    CHLORIDE 104 05/25/2023    CO2 24 05/26/2023    CO2 25 05/25/2023    BUN 33.4 (H) 05/26/2023    BUN 32.2 (H) 05/25/2023    CR 1.13 05/26/2023    CR 1.24 (H) 05/25/2023    GLC 90 05/26/2023     (H) 05/26/2023     COAGS: No results found for: PTT, INR, FIBR  POC: No results found for: BGM, HCG, HCGS  HEPATIC:   Lab Results   Component Value Date    ALBUMIN 3.7 05/26/2023    PROTTOTAL 6.7 05/26/2023    ALT 6 (L) 05/26/2023    AST 10 05/26/2023    ALKPHOS 92 05/26/2023    BILITOTAL 0.3 05/26/2023     OTHER:   Lab Results   Component Value Date    LACT 1.3 03/20/2023    A1C 5.8 (H) 03/06/2023    MAIKOL 8.9 05/26/2023    CRP 3.6 02/06/2023    SED 77 (H) 01/09/2023       Anesthesia Plan    ASA Status:  3    NPO Status:  NPO Appropriate    Anesthesia Type: General.     - Airway: ETT   Induction: Intravenous.      Techniques and Equipment:     - Lines/Monitors: 2nd IV     Consents    Anesthesia Plan(s) and associated risks, benefits, and realistic alternatives discussed. Questions answered and patient/representative(s) expressed understanding.     - Discussed:     - Discussed with:  Patient       Use of blood products discussed: Yes.     - Discussed with: Patient.     - Consented: consented to blood products            Reason for refusal: other.     Postoperative Care    Pain management: IV analgesics, Oral pain medications, Multi-modal analgesia.   PONV prophylaxis: Ondansetron (or other 5HT-3), Dexamethasone or Solumedrol     Comments:           H&P reviewed: Unable to attach H&P to encounter due to EHR limitations. H&P Update: appropriate H&P reviewed, patient examined. No interval changes since H&P (within 30 days).         Jillian French MD

## 2023-05-26 NOTE — PLAN OF CARE
Goal Outcome Evaluation:       Overall Pt had no acute issue this shift. Denied chest pain, SOB, N/V, fever and chills. Pt scrubbed with chlorhexidine wipe this shift.  Pt continent of both bowel and bladder. Due for surgery this morning. Pt has no complain at this time. Will continue with POC      Patient's most recent vital signs are:     Vital signs:  BP: 125/70  Temp: 97.4  HR: 102  RR: 18  SpO2: 92 %     Patient does not have new respiratory symptoms.  Patient does not have new sore throat.  Patient does not have a fever greater than 99.5.

## 2023-05-26 NOTE — ANESTHESIA CARE TRANSFER NOTE
Patient: Waldo Bustos    Procedure: Procedure(s):  Explantation of Left Hip Antibiotic Spacer  Revision Left Total Hip Arthroplasty       Diagnosis: Infection of prosthetic joint, sequela [T84.50XS]  Diagnosis Additional Information: No value filed.    Anesthesia Type:   General     Note:      Level of Consciousness: drowsy  Oxygen Supplementation: blow-by O2  Level of Supplemental Oxygen (L/min / FiO2): 6  Independent Airway: airway patency satisfactory and stable  Dentition: dentition unchanged  Vital Signs Stable: post-procedure vital signs reviewed and stable    Patient transferred to: PACU    Handoff Report: Identifed the Patient, Identified the Reponsible Provider, Reviewed the pertinent medical history, Discussed the surgical course, Reviewed Intra-OP anesthesia mangement and issues during anesthesia, Set expectations for post-procedure period and Allowed opportunity for questions and acknowledgement of understanding      Vitals:  Vitals Value Taken Time   /61 05/26/23 1816   Temp     Pulse 89 05/26/23 1818   Resp 8 05/26/23 1818   SpO2 95 % 05/26/23 1818   Vitals shown include unvalidated device data.    Electronically Signed By: JOSEPH Chavez CRNA  May 26, 2023  6:19 PM

## 2023-05-26 NOTE — BRIEF OP NOTE
[facility]    Brief Operative Note    Pre-operative diagnosis: Infection of prosthetic joint, sequela [T84.50XS]  Post-operative diagnosis Same as pre-operative diagnosis    Procedure: Procedure(s):  Explantation of Left Hip Antibiotic Spacer  Revision Left Total Hip Arthroplasty  Surgeon: Surgeon(s) and Role:     * Rom Meyers MD - Primary     * Angelito Garcia MD - Fellow - Assisting  Anesthesia: General   Estimated Blood Loss: 500 mL from 5/26/2023 12:47 PM to 5/26/2023  6:12 PM      Drains: Atif-Betancourt  Specimens:   ID Type Source Tests Collected by Time Destination   A : A; Left Hip #1 Tissue Hip, Left ANAEROBIC BACTERIAL CULTURE ROUTINE, AEROBIC BACTERIAL CULTURE ROUTINE Rom Meyers MD 5/26/2023  2:06 PM    B : B; Left Hip #2 Tissue Hip, Left ANAEROBIC BACTERIAL CULTURE ROUTINE, AEROBIC BACTERIAL CULTURE ROUTINE Rom Meyers MD 5/26/2023  2:36 PM    C : C; Left Hip #3 Tissue Hip, Left ANAEROBIC BACTERIAL CULTURE ROUTINE, AEROBIC BACTERIAL CULTURE ROUTINE Rom Meyers MD 5/26/2023  2:36 PM    D : D; Left Hip #4 Tissue Hip, Left ANAEROBIC BACTERIAL CULTURE ROUTINE, AEROBIC BACTERIAL CULTURE ROUTINE Rom Meyers MD 5/26/2023  2:36 PM    E : E; Left Hip #5 Tissue Hip, Left ANAEROBIC BACTERIAL CULTURE ROUTINE, AEROBIC BACTERIAL CULTURE ROUTINE Rom Meyers MD 5/26/2023  2:36 PM      Findings:   See op report  Complications: None.  Implants:   Implant Name Type Inv. Item Serial No.  Lot No. LRB No. Used Action   CEMENT BONE GENTAMICIN LOW VISCOSITY 1X35.3GR [id number] - [id number] Cement, Bone CEMENT BONE GENTAMICIN LOW VISCOSITY 1X35.3GR 028596  [employer] [id number] Left 3 Explanted   CEMENT BONE GENTAMICIN LOW VISCOSITY 1X35.3GR 127372 - [id number] Cement, Bone CEMENT BONE GENTAMICIN LOW VISCOSITY 1X35.3GR 855418  [employer] [id number] Left 1 Explanted   IMP SPACER G21 CEMENT 13MM SPACEFLEX HIP MTL STEM 72728 13 - [id number]  Total Joint Component/Insert IMP SPACER G21 CEMENT 13MM SPACEFLEX HIP MTL STEM 08254 13  [employer] [id number] Left 1 Explanted   G7 ACETABULAR SHELL 64 MM Total Joint Component/Insert   BIOMET [id number] Left 1 Implanted   IMP SCR ZIM 6.5X20MM ACET CUP SELF TAP -920-20 - [id number] Metallic Hardware/[employer] IMP SCR ZIM 6.5X20MM ACET CUP SELF TAP -065-20  [employer] [id number] Left 1 Implanted   IMP SCR ZIM 6.5X30MM ACET CUP SELF TAP -065-30 - [id number] Metallic Hardware/[employer] IMP SCR ZIM 6.5X30MM ACET CUP SELF TAP -065-30  [employer] [id number] Left 1 Implanted   IMP SCR ZIM 6.5X25MM ACET CUP SELF TAP -065-25 - [id number] Metallic Hardware/[employer] IMP SCR ZIM 6.5X25MM ACET CUP SELF TAP -065-25  [employer] [id number] Left 1 Implanted   STS DISTAL STEM WITH LOCKING SCREW 17 MM X 190 MM Total Joint Component/Insert   BIOMET [id number] Left 1 Implanted   LINER ACTB G7 H 36MM LNGVT HIP STRL LUM LF [id number] - [id number] Total Joint Component/Insert LINER ACTB G7 H 36MM LNGVT HIP STRL LUM LF [id number]  [employer] [id number] Left 1 Implanted   HIGH OFFSET CONE PROXIMAL BODY, SIZE C 60 MM Total Joint Component/Insert   BIOMET [id number] Left 1 Implanted   IMP SLEEVE BIOM TYPE1 TPR FOR BIOLOX DELTA -6MM 650-1064 - [id number] Total Joint Component/Insert IMP SLEEVE BIOM TYPE1 TPR FOR BIOLOX DELTA -6MM 650-1064  [employer] [id number] Left 1 Implanted   CERAMIC HEAD 36 MM  Total Joint Component/Insert   BIOMET [id number] Left 1 Implanted     Plan:  50% PWB LLE with posterior hip precautions  Postop abx: ancef x24h  Postop XR  F/U OR cultures  F/U Drain  Pain control  DVT ppx: Eliquis  PT/OT, OOB  Appreciate medicine recs  F/U 4 weeks  Dispo planning    Future Appointments   Date Time Provider Department Center   5/27/2023 10:45 AM Mona Romano, PT [facility]   5/27/2023  1:00 PM Margot Luo, OT [facility]   5/27/2023  3:30 PM [facility] PT WAITLIST [facility]   6/26/2023 10:20  Rom Raya MD [facility]       Angelito Garcia MD  Adult Reconstructive Surgery Fellow  Department of Orthopaedic Surgery, [facility]

## 2023-05-26 NOTE — CONSULTS
[facility]  Consult Note - Hospitalist Service  Date of Admission:  5/26/2023  Consult Requested by: Dr. Garcia  Reason for Consult: Diabetes mellitus, hyperlipidemia      Assessment & Plan   Waldo Bustos is a 72 year old male with history of morbid obesity, diabetes, hyperlipidemia, JAIR, PE, DVT, venous insufficiency, osteoarthritis, chronic left hip prosthetic joint infection, recent admission to [facility] on 1120 2/2022 for explantation of left hip prosthesis, debridement, reconstruction with antibiotic spacer, toxic encephalopathy, acute hypoxic respiratory failure due to obesity hypoventilation syndrome, acute blood loss anemia, HALEY, physical deconditioning is being admitted to Ortho service status post explantation of left hip antibiotic spacer, revision left total hip arthroplasty on 5/26/2023.  Medicine has been consulted for extensive medical comorbidities.    # status post explantation of left hip antibiotic spacer, revision left hip total arthroplasty on 5/26/2023:  Management primarily per Ortho team.  - Wound cares, Dressings, Surgical pain management, DVT Prophylaxis  per primary team.   - Monitor anemia, hemodynamics, Input/Output, Capnography (for 24 hours)  - Encourage Incentive spirometry  - Laxatives for constipation prophylaxis  -Follow-up on culture data.     # Anemia: Most likely due to blood loss from recent surgery.  And anemia of chronic inflammation.  Hemoglobin 10.5 g/dL on 5/26/2023.  Estimated blood loss 500 mL  >Expect hemoglobin to drift lower due to blood loss  Plan:  - Hemoglobin in a.m.  - Transfuse if Hg < 7 gm/dl         # Diabetes mellitus type 2 (A1c 5.8% 3/6/23)  PTA glipizide discontinued d/t hypoglycemia risk.   >Diabetic medications (insulin aspart medium intensity correction QID, Metformin, Actos and Victoza) were held on the the morning of surgery    Plan:  - Continue to hold oral hypoglycemic agent.  - Continue insulin  "aspart medium intensity correction before meals and bedtime.  - Monitor and titrate as needed.    # Hyperlipidemia  Prior to admission on atorvastatin 40 mg daily.  Stable  Plan:  - Continue atorvastatin     # Obstructive sleep apnea  -Uses CPAP at night  Plan:  Continue CPAP  - Monitor on pulse oximetry and capnography per postop protocol     # Unprovoked DVT in L common femoral, deep and superficial femoral, popliteal, posterior tibial veins and PE (2018) on chronic anticoagulation  >Prior to admission apixaban.  >Apixaban has been held in preparation for surgery.  Plan:  -Restart apixaban as soon as possible when okay with surgery.      # Depression vs. Adjustment Disorder with Depressed Mood  Depressed mood in setting of ongoing health issues. Passive suicidal statements to nursing staff in the past. Not interested in medication therapy or psychiatry consultation.   - Health psychology was not available to follow in the rehab.  Consider reinitiating post discharge when Health Psychology is able to connect with him.    # Right elbow, shoulder numbness:  Likely positional.  Strength 5 x 5.  Sensation intact.  Plan:  - Continue to monitor.       Clinically Significant Risk Factors Present on Admission                    # Circulatory Shock: currently requiring pressors for blood pressure support     # Obesity: Estimated body mass index is 34.91 kg/m  as calculated from the following:    Height as of this encounter: 1.778 m (5' 10\").    Weight as of this encounter: 110.4 kg (243 lb 4.8 oz).            Aditya Stallworth MD  Hospitalist Service  Securely message with [employer] (more info)  Text page via [employer] Paging/Directory   ______________________________________________________________________    Chief Complaint     Status post orthopedic surgery    History is obtained from the patient    History of Present Illness   Waldo Bustos is a 72 year old male with history of morbid obesity, diabetes, hyperlipidemia, JAIR, " PE, DVT, venous insufficiency, osteoarthritis, chronic left hip prosthetic joint infection, recent admission to [facility] on 1120 2/2022 for explantation of left hip prosthesis, debridement, reconstruction with antibiotic spacer, toxic encephalopathy, acute hypoxic respiratory failure due to obesity hypoventilation syndrome, acute blood loss anemia, HALEY, physical deconditioning is being admitted to Ortho service status post explantation of left hip antibiotic spacer, revision left total hip arthroplasty on 5/26/2023.  Medicine has been consulted for extensive medical comorbidities.    Currently patient reports doing well.  He reports numbness on the elbow and right shoulder.  Reports he noticed after surgery.  No pain.  No nausea, vomiting, chest pain, shortness of breath, lightheadedness or dizziness.      Past Medical History    Past Medical History:   Diagnosis Date     Arthritis 5 years ago     Chronic osteoarthritis Not sure     Diabetes (H) 10-12 years ago     DVT (deep venous thrombosis) (H)      Dyslipidemia      Gastroesophageal reflux disease      History of blood transfusion      Iliopsoas abscess (H)      Infection of prosthetic hip joint (H)      JAIR (obstructive sleep apnea)      Pulmonary embolism (H)      RA (rheumatoid arthritis) (H) Not sure     Reduced vision 2-3 years ago    Cataract Surgery on both eyes     Venous insufficiency        Past Surgical History   Past Surgical History:   Procedure Laterality Date     ASPIRATION NEEDLE HIP Left 4/7/2023    Procedure: ARTHROCENTESIS, LEFT HIP;  Surgeon: Rom Meyers MD;  Location: [facility] OR     Cataract       COLONOSCOPY       EGD       IR FINE NEEDLE ASPIRATION W ULTRASOUND  3/6/2023     IR IVC FILTER PLACEMENT      removed     IRRIGATION AND DEBRIDEMENT HIP, PLACE ANTIBIOTIC CEMENT BEADS / SPACER Left 11/22/2022    Procedure: and Reconstruction Using G 20 Prosthetic Antibiotic Cement Spacer;  Surgeon: Rom Meyers MD;  Location: [facility] OR     REMOVE  HARDWARE ARTHROPLASTY HIP Left 11/22/2022    Procedure: Explantation of Chronic  Infected Left Total Hip Arthroplasty, Extended Trochanteric Osteotomy with Extensive Debridement;  Surgeon: Rom Meyers MD;  Location: [facility] OR     SYNOVIAL BIOPSY HIP Left 4/7/2023    Procedure: BIOPSY, SYNOVIUM, LEFT  HIP, percutaneous;  Surgeon: Rom Meyers MD;  Location: [facility] OR     C PELVIS/HIP JOINT SURGERY UNLISTED       [facility] STOMACH SURGERY PROCEDURE UNLISTED  1954    2 Hernia surgeries as a child       Medications   I have reviewed this patient's current medications  Current Facility-Administered Medications   Medication     acetaminophen (TYLENOL) tablet 975 mg     acetaminophen (TYLENOL) tablet 975 mg     apixaban ANTICOAGULANT (ELIQUIS) tablet 5 mg     dexamethasone (DECADRON) injection 4 mg     glucose gel 15-30 g    Or     dextrose 50 % injection 25-50 mL    Or     glucagon injection 1 mg     fentaNYL (PF) (SUBLIMAZE) injection 25 mcg     fentaNYL (PF) (SUBLIMAZE) injection 50 mcg     hydrALAZINE (APRESOLINE) injection 2.5-5 mg     HYDROmorphone (PF) (DILAUDID) injection 0.2 mg     HYDROmorphone (PF) (DILAUDID) injection 0.2 mg    Or     HYDROmorphone (PF) (DILAUDID) injection 0.4 mg     HYDROmorphone (PF) (DILAUDID) injection 0.4 mg     ibuprofen (ADVIL/MOTRIN) tablet 600 mg     insulin aspart (NovoLOG) injection (RAPID ACTING)     labetalol (NORMODYNE/TRANDATE) injection 10 mg     lactated ringers infusion     methocarbamol (ROBAXIN) tablet 500 mg     midazolam (VERSED) injection 0.5-1 mg     ondansetron (ZOFRAN ODT) ODT tab 4 mg    Or     ondansetron (ZOFRAN) injection 4 mg     oxyCODONE (ROXICODONE) tablet 5 mg    Or     oxyCODONE IR (ROXICODONE) tablet 10 mg     prochlorperazine (COMPAZINE) injection 5 mg     ropivacaine (NAROPIN) 5 MG/ mg, ketorolac (TORADOL) 30 mg, EPINEPHrine (ADRENALIN) 0.6 mg in sodium chloride 0.9 % 100 mL (ORTHO IGNACIO STANDARD DOSE)     ropivacaine (NAROPIN) 5 MG/ mg, ketorolac  (TORADOL) 30 mg, EPINEPHrine (ADRENALIN) 0.6 mg in sodium chloride 0.9 % 100 mL (ORTHO IGNACIO STANDARD DOSE)          Review of Systems    The 10 point Review of Systems is negative other than noted in the HPI or here.     Social History   I have reviewed this patient's social history and updated it with pertinent information if needed.  Social History     Tobacco Use     Smoking status: Never     Smokeless tobacco: Never   Substance Use Topics     Alcohol use: No     Drug use: No       Family History     No significant family history, including no history of:     Allergies   Allergies   Allergen Reactions     Sulfa Antibiotics      Other reaction(s): *Unknown - Childhood Rxn     Tizanidine Other (See Comments)     Frequent urination; causes drowsiness, and dry mouth          Physical Exam   Vital Signs: Temp: 99.3  F (37.4  C) Temp src: Axillary BP: 134/70 Pulse: 90   Resp: 15 SpO2: 97 % O2 Device: Oxymask Oxygen Delivery: 8 LPM  Weight: 243 lbs 4.79 oz    General Appearance: Laying in bed in no acute distress  Respiratory: Bilaterally clear to auscultation  Cardiovascular: 1, S2 normal  GI: Soft, nontender, bowel sounds positive  Skin: No obvious rashes  Other: Left hip dressing in place    Medical Decision Making       **CLEAR ALL SELECTIONS**      Data   ------------------------- PAST 24 HR DATA REVIEWED -----------------------------------------------    I have personally reviewed the following data over the past 24 hrs:    9.5  \   10.5 (L)   / 309     139 105 33.4 (H) /  188 (H)   4.4 24 1.13 \       ALT: 6 (L) AST: 10 AP: 92 TBILI: 0.3   ALB: 3.7 TOT PROTEIN: 6.7 LIPASE: N/A       Imaging results reviewed over the past 24 hrs:   No results found for this or any previous visit (from the past 24 hour(s)).

## 2023-05-26 NOTE — PLAN OF CARE
Goal Outcome Evaluation:    Patient is alert and oriented x 4. He is able to make his needs known. Pt denied SOB and chest pain.  Pt is regular diet, and able to swallow without difficulty.Takes med whole with thin liquid. Pt Pt remain NWB to LLE. Assist of 1 with liko lift for transfer. Pt was up in recliner for several hours during the shift. He is continent of bowel and bladder with urinal within reach at bedside. Pt requested PRN Tylenol, Robaxin and Tramadol for neck pain. He also refused scheduled Miconazole powder. Pt has a surgical procedure tomorrow, and he is expected to be NPO at midnight. Chlorhexidine prep will be done per pt's preference time. Pt was educated about appointment.    Patient's most recent vital signs are:     Vital signs:  BP: 125/70  Temp: 97.4  HR: 102  RR: 18  SpO2: 92 %     Patient does not have new respiratory symptoms.  Patient does not have new sore throat.  Patient does not have a fever greater than 99.5.

## 2023-05-26 NOTE — OR NURSING
PACU to Inpatient Nursing Handoff    Patient Waldo Bustos is a 72 year old male who speaks English.   Procedure Procedure(s):  Explantation of Left Hip Antibiotic Spacer  Revision Left Total Hip Arthroplasty   Surgeon(s) Primary: Rom Meyers MD  Fellow - Assisting: Angelito Garcia MD     Allergies   Allergen Reactions     Sulfa Antibiotics      Other reaction(s): *Unknown - Childhood Rxn     Tizanidine Other (See Comments)     Frequent urination; causes drowsiness, and dry mouth         Isolation  [unfilled]     Past Medical History   has a past medical history of Arthritis (5 years ago), Chronic osteoarthritis (Not sure), Diabetes (H) (10-12 years ago), DVT (deep venous thrombosis) (H), Dyslipidemia, Gastroesophageal reflux disease, History of blood transfusion, Iliopsoas abscess (H), Infection of prosthetic hip joint (H), JAIR (obstructive sleep apnea), Pulmonary embolism (H), RA (rheumatoid arthritis) (H) (Not sure), Reduced vision (2-3 years ago), and Venous insufficiency.    Anesthesia General   Dermatome Level     Preop Meds acetaminophen (Tylenol) - time given: 1137  TXA - time given: 1132; Tramadol given at care facility at 1032   Nerve block Not applicable   Intraop Meds dexamethasone (Decadron)  fentanyl (Sublimaze): 200 mcg total  hydromorphone (Dilaudid): 1 mg total  ondansetron (Zofran): last given at 1800   Local Meds Yes - Local Cocktail (morphine, ropivacaine, epinephrine, Toradol)   Antibiotics cefazolin (Ancef) - last given at 1311     Pain Patient Currently in Pain: denies   PACU meds  hydromorphone (Dilaudid): 0.4 mg (total dose) last given at 1922    PCA / epidural No   Capnography Respiratory Monitoring (EtCO2): 33 mmHg   Telemetry ECG Rhythm: Sinus rhythm;First degree AV block   Inpatient Telemetry Monitor Ordered? No        Labs Glucose Lab Results   Component Value Date     05/26/2023     02/06/2023       Hgb Lab Results   Component Value Date    HGB 10.5 05/26/2023        INR No results found for: INR   PACU Imaging Completed     Wound/Incision Wound Arm (Active)   Wound Bed Blister-Serous 05/26/23 1813   Daria-wound Assessment Erythema 05/26/23 1813   Drainage Amount None 05/26/23 1813   Wound Care/Cleansing Other (Comment) 05/26/23 1813   Number of days: 0       Wound Groin Pressure injury suspected hospital acquired NA (Active)   Wound Bed Red;Erythema, non-blanchable, skin intact;Bleeding 05/26/23 1813   Daria-wound Assessment Erythema 05/26/23 1813   Drainage Amount None 05/26/23 1813   Wound Care/Cleansing Other (Comment) 05/26/23 1813   Dressing Other (Comment) 05/26/23 1813   Dressing Status Clean, dry, intact 05/26/23 1813   Number of days:        Incision/Surgical Site 05/26/23 Left Hip (Active)   Incision Assessment UTV 05/26/23 1813   Daria-Incision Assessment UTV 05/26/23 1813   Closure DEEP 05/26/23 1813   Dressing Intervention Clean, dry, intact 05/26/23 1813   Number of days: 0      CMS        Equipment ice pack   Other LDA       IV Access Peripheral IV 05/26/23 Left Lower forearm (Active)   Site Assessment WDL 05/26/23 1813   Line Status Infusing 05/26/23 1813   Dressing Transparent 05/26/23 1813   Dressing Status dry;clean;intact 05/26/23 1813   Dressing Intervention New dressing  05/26/23 1120   Line Intervention Flushed 05/26/23 1120   Phlebitis Scale 0-->no symptoms 05/26/23 1813   Infiltration? no 05/26/23 1813   Number of days: 0       Peripheral IV 05/26/23 Left Wrist (Active)   Site Assessment WDL 05/26/23 1813   Line Status Saline locked 05/26/23 1813   Dressing Transparent 05/26/23 1813   Dressing Status clean;dry;intact 05/26/23 1813   Phlebitis Scale 0-->no symptoms 05/26/23 1813   Infiltration? no 05/26/23 1813   Number of days: 0      Blood Products Not applicable  mL   Intake/Output Date 05/26/23 0700 - 05/27/23 0659   Shift 8267-8377 0011-1588 0615-7437 24 Hour Total   INTAKE   I.V.  1000  1000   Shift Total(mL/kg)  1000(9.06)  1000(9.06)    OUTPUT   Urine 50   50   Blood  500  500   Shift Total(mL/kg) 50(0.45) 500(4.53)  550(4.98)   Weight (kg) 110.36 110.36 110.36 110.36      Drains / Tran Closed/Suction Drain 1 Left;Lateral Hip Bulb 15 [place] (Active)   Site Description UTV 05/26/23 1813   Dressing Status Normal: Clean, Dry & Intact 05/26/23 1813   Drainage Appearance Bloody/Bright Red 05/26/23 1813   Status To bulb suction 05/26/23 1813   Number of days: 0      Time of void PreOp Time of Void Prior to Procedure: 1146 (05/26/23 1146)    PostOp Voided (mL): 50 mL (05/26/23 1145)  Urine Occurrence: 1 (05/26/23 1145)  Urinary Incontinence: No (05/26/23 1145)    Diapered? No   Bladder Scan     PO    tolerating sips     Vitals    B/P: 134/70  T: 99.3  F (37.4  C)    Temp src: Axillary  P:  Pulse: 90 (05/26/23 1845)          R: 15  O2:  SpO2: 97 %    O2 Device: Oxymask (05/26/23 1845)    Oxygen Delivery: 8 LPM (05/26/23 1845)         Family/support present significant other by phone   Patient belongings     Patient transported on cart   DC meds/scripts (obs/outpt) Not applicable   Inpatient Pain Meds Released? Yes       Special needs/considerations Pt has blanchable redness on his abdomen from positioning in the OR. Also acquired a skin tear in pannus abdominal fold in OR. Aquaphor and interdry applied.    Tasks needing completion None       Joy Lewis RN   [facility]  [id number]

## 2023-05-26 NOTE — PLAN OF CARE
Goal Outcome Evaluation:    Patient had second chlorhexidene bath and shampooed his hair. Patient is having surgery this morning/[place] OR. I talked with Pita, receiving RN, and gave her report. She told me if patient having pain before transfer to pre-op/OR, it is ok to give him a tramadol (as routinely takes in morning) if he needs it, which I did. Belongings sent with patient to pre-op area: C-Pap, wallet, cell phone and , glasses. Bed Hold Policy given to patient. All other belongings packed up and stored. Discharged to surgery at 1049.         Patient's most recent vital signs are:     Vital signs:  BP: 114/55  Temp: 97.1  HR: 81  RR: 17  SpO2: 92 %     Patient does not have new respiratory symptoms.  Patient does not have new sore throat.  Patient does not have a fever greater than 99.5.

## 2023-05-27 NOTE — PROGRESS NOTES
[facility]    Medicine Progress Note - Hospitalist Service, GOLD TEAM 16    Date of Admission:  5/26/2023    Assessment & Plan      72 year old male with history of morbid obesity, diabetes, hyperlipidemia, JAIR, PE, DVT, venous insufficiency, osteoarthritis, chronic left hip prosthetic joint infection, recent admission to [facility] on 1120 2/2022 for explantation of left hip prosthesis, debridement, reconstruction with antibiotic spacer, toxic encephalopathy, acute hypoxic respiratory failure due to obesity hypoventilation syndrome, acute blood loss anemia, HALEY, physical deconditioning is being admitted to Ortho service status post explantation of left hip antibiotic spacer, revision left total hip arthroplasty on 5/26/2023.  Medicine has been consulted for extensive medical comorbidities.     # status post explantation of left hip antibiotic spacer, revision left hip total arthroplasty on 5/26/2023:  Management primarily per Ortho team.  - Wound cares, Dressings, Surgical pain management, DVT Prophylaxis  per primary team.   - Monitor anemia, hemodynamics, Input/Output, Capnography (for 24 hours)  - Encourage Incentive spirometry  - Laxatives for constipation prophylaxis  -Follow-up on culture data.     # Anemia: Most likely due to blood loss from recent surgery.  And anemia of chronic inflammation.  Hemoglobin 10.5 g/dL on 5/26/2023.  Estimated blood loss 500 mL  > Hg 9.9 gm/dl on 05/27/2023    Plan:  - Hemoglobin in a.m.  - Transfuse if Hg < 7 gm/dl      # Renal insufficiency: Creatinine level 1.4 on 5/27/2023.  Does not exactly meet the criteria for HALEY.  Plan:  - Avoid nephrotoxins.  - LR infusion  - Renal dose medications  - Avoid hypotension, hypovolemia  - Strict I and O  - Monitor fluid intake.       # Diabetes mellitus type 2 (A1c 5.8% 3/6/23)  PTA glipizide discontinued d/t hypoglycemia risk.   >Diabetic medications (insulin aspart medium intensity correction  "QID, Metformin, Actos and Victoza) were held on the the morning of surgery     Plan:  - Continue to hold oral hypoglycemic agent.  - Continue insulin aspart medium intensity correction before meals and bedtime.  - Monitor and titrate as needed.     # Hyperlipidemia  > Prior to admission on atorvastatin 40 mg daily.  > Stable  Plan:  - Continue atorvastatin     # Obstructive sleep apnea  Uses CPAP at night  Plan:  Continue CPAP  - Monitor on pulse oximetry and capnography per postop protocol     # Unprovoked DVT in L common femoral, deep and superficial femoral, popliteal, posterior tibial veins and PE (2018) on chronic anticoagulation  >Prior to admission apixaban.  >Apixaban has been held in preparation for surgery.  >Apixaban has been started  Plan:  -Continue apixaban        # Depression vs. Adjustment Disorder with Depressed Mood  Depressed mood in setting of ongoing health issues. Passive suicidal statements to nursing staff in the past. Not interested in medication therapy or psychiatry consultation.   - Health psychology was not available to follow in the rehab.  Consider reinitiating post discharge when Health Psychology is able to connect with him.     # Right elbow, shoulder numbness:  Likely positional.  Strength 5 x 5.  Sensation intact.  Plan:  - Continue to monitor.             Diet: Advance Diet as Tolerated: Regular Diet Adult    DVT Prophylaxis: DOAC  Tran Catheter: Not present  Lines: None     Cardiac Monitoring: None  Code Status: Full Code      Clinically Significant Risk Factors          # Hypocalcemia: Lowest Ca = 8.2 mg/dL in last 2 days, will monitor and replace as appropriate        # Acute Kidney Injury, unspecified: based on a >150% or 0.3 mg/dL increase in last creatinine compared to past 90 day average, will monitor renal function         # Obesity: Estimated body mass index is 34.91 kg/m  as calculated from the following:    Height as of this encounter: 1.778 m (5' 10\").    Weight as " of this encounter: 110.4 kg (243 lb 4.8 oz)., PRESENT ON ADMISSION          Disposition Plan      Expected Discharge Date: 05/28/2023                  Aditya Stallworth MD  Hospitalist Service, GOLD TEAM 16  [facility]  Securely message with [employer] (more info)  Text page via [employer] Paging/Directory   See signed in provider for up to date coverage information  ______________________________________________________________________    Interval History   Overnight events reviewed.  Patient reports doing well this morning.  Patient pain is controlled  Denies any nausea vomiting chest pain or shortness of breath.    Physical Exam   Vital Signs: Temp: (!) 96.4  F (35.8  C) Temp src: Oral BP: (!) 116/37 Pulse: 75   Resp: 14 SpO2: 95 % O2 Device: Nasal cannula Oxygen Delivery: 3 LPM  Weight: 243 lbs 4.79 oz    General Appearance: Laying in bed in no acute distress.  Respiratory: B/L CTA  Cardiovascular: S1, S2 normal.   GI: Soft, NT, BS+  Skin:   Other:     Medical Decision Making       **CLEAR ALL SELECTIONS**      Data   ------------------------- PAST 24 HR DATA REVIEWED -----------------------------------------------    I have personally reviewed the following data over the past 24 hrs:    N/A  \   9.9 (L)   / N/A     137 101 41.4 (H) /  140 (H)   5.0 24 1.40 (H) \       Imaging results reviewed over the past 24 hrs:   Recent Results (from the past 24 hour(s))   XR Pelvis Port 1/2 Views    Narrative    EXAM: XR PELVIS PORT 1/2 VIEWS  LOCATION: [facility]  DATE/TIME: 5/26/2023 4:45 PM CDT    INDICATION: Intra op  COMPARISON: 04/24/2023.      Impression    IMPRESSION: Left total hip arthroplasty in progress. Cerclage wires around the proximal shaft of the femur again noted. The new femoral stem extends across the proximal shaft femur fracture, which is in anatomic position and alignment. No new fracture   evident. Extensive arterial  calcifications again noted.   XR Pelvis w Hip Port Left  1 View    Narrative    EXAM: XR PELVIS AND HIP PORTABLE LEFT 1 VIEW  LOCATION: [facility]  DATE/TIME: 5/26/2023 7:47 PM CDT    INDICATION: Status post hip surgery.  COMPARISON: 4/24/2023.      Impression    IMPRESSION: Left total hip revision with custom longstem component within the femur. There is a surgical drain in place. Hardware is well seated without an acute periprostatic fracture or dislocation. Bone demineralization. Transverse, nondisplaced   fracture of the lateral femoral shaft again seen.

## 2023-05-27 NOTE — PLAN OF CARE
Pt arrived to 5 ortho at 2030. Pt is A&Ox4. VSS. LS clear, on RA. BS active, LBM on 5/23/2023. Voided 175ml, PVR 160ml. Pain managed with oxycodone. L hip surgical dressing is c/d/I. GUERRERO patent and draining well. Pt has not been oob. L PIV is patent and infusing TKO. Continue to monitor.

## 2023-05-27 NOTE — PLAN OF CARE
"Goal Outcome Evaluation:      VS: /56 (BP Location: Right arm)   Pulse 78   Temp (!) 96.2  F (35.7  C) (Oral)   Resp 16   Ht 1.778 m (5' 10\")   Wt 110.4 kg (243 lb 4.8 oz)   SpO2 97%   BMI 34.91 kg/m     O2: Stable on RA.   Output: Void  in the urinal with out difficulty.   Last BM: 5/23,    Activity: Pt did not get out of bed.    Skin: Inter dry under abdominal, surgical dressing CDI.   Pain: Po oxycodone and tylenol.   Neuro: AOx4.    Dressing: CDI.   Diet: Regular diet, and diabetic.   LDA: PIV infusing 125.   Equipment: Call light with in reach.    Plan: TBD.    Additional Info:         "

## 2023-05-27 NOTE — ANESTHESIA POSTPROCEDURE EVALUATION
Patient: Waldo Bustos    Procedure: Procedure(s):  Explantation of Left Hip Antibiotic Spacer  Revision Left Total Hip Arthroplasty       Anesthesia Type:  General    Note:  Disposition: Inpatient   Postop Pain Control: Uneventful            Sign Out: Well controlled pain   PONV: No   Neuro/Psych: Uneventful            Sign Out: Acceptable/Baseline neuro status   Airway/Respiratory: Uneventful            Sign Out: Acceptable/Baseline resp. status   CV/Hemodynamics: Uneventful            Sign Out: Acceptable CV status; No obvious hypovolemia; No obvious fluid overload   Other NRE:    DID A NON-ROUTINE EVENT OCCUR?            Last vitals:  Vitals Value Taken Time   /65 05/26/23 1945   Temp 36.4  C (97.5  F) 05/26/23 1945   Pulse 86 05/1951   Resp 11 05/1951   SpO2 95 % 05/26/23 2008   Vitals shown include unvalidated device data.    Electronically Signed By: Renee Beach MD  May 26, 2023  9:49 PM

## 2023-05-27 NOTE — OP NOTE
Date of Surgery:   5/26/2023     Preop Dx:   L BELLA PJI    Postop Dx:   L BELLA PJI     Procedure:  L Revision BELLA  Removal of static antibiotic spacer     Attending Surgeon:  Rom Meyers MD     Assistants:  Angelito Garcia MD     Anesthesia:   General anesthesia with ET tube     Complications:   None     EBL:   500cc     IV fluids:   See anesthesia records     Components used:  [employer]  G7 Multihole cup: Size 64  [id number] high walled liner  Morris stem [id number]  60C High offset body  36mm -6 ceramic head  6.5mm acetabular screws x3     Findings:  No obvious purulence. Spacer removed. Acetabular component placed. Previous osteotomy appeared to be stable. Morris stem placed.      Indications for procedure:  The patient is a 73yo M with a previous history of L BELLA PJI. He underwent an explant of the L BELLA in November of 2022. During that time, an ETO was performed, and a static antibiotic spacer was inserted. He has since been on antibiotics and cleared the infection, confirmed by tissue biopsy. We recommended a 2nd stage revision of the L BELLA in the form of removal of the spacer, and revision of the BELLA.      The risks, benefits, and alternatives to surgery were explained at length with the patient.  The risks include but are not limited to pain, bleeding, infection, damage to surrounding structures, loosening, mechanical hardware failure, dislocation, leg length discrepancy, blood clots, COVID-19, the need for future procedures, as well as the risks of anesthesia itself.  The patient understood these risks, agreed to have surgery, and voluntarily signed a written informed consent.     Description of Procedure:  The patient was identified in the preoperative holding area by Dr. Meyers, and the Left lower extremity was then marked.  The patient was then greeted by the anesthesia team who brought the patient back to the operating theater.  The patient was then transferred onto the operating table in the supine  position.  General anesthesia was induced without any complications.  The patient was then repositioned in the lateral decubitus position with the left hip pointing up. All of the bony prominences were well-padded.  The Left lower extremity was then prepped and draped in usual sterile fashion.  A timeout was performed confirming the patient name, date of birth, procedure to be performed, as well as laterality.  Perioperative antibiotics were given to the patient prior to commencement of surgery.    A 20 cm incision was place in line with the previous incision. A posterior approach of the hip was preformed. Strict hemostasis was obtained by using the electrocautery. Dissection was carried down to the fascia. The fascia was incised. Careful dissection was performed to gain access to the hip joint. 5 intraoperative cultures were taken at this time.     Once adequate exposure of the hip joint was achieved, the antibiotic spacer was removed in a piecemeal fashion. The acetabulum was then reamed sequentially from a 48mm reamer to a 63mm reamer. A 64mm osseoTi multihole cup was then inserted in the acetabulum. Three 6.5mm acetabulum screw were then placed. A high wall trial liner was then placed.     Next, we turned our attention to the femur. The femur was significantly contracted. Releases of the capsule were performed to free up the femur. A ball-tipped guidewire was inserted into the canal. The canal was then sequentially reamed with flexible reamers from 9.5 to 12.5mm. Next, using the Morris straight reamers, we sequentially reamed the canal to a 17x190 stem at a 80mm body. We then inserted the real 17x190 stem, sinking it all the way down for a 80mm body. Next, we reamed for the proximal body to a C.   Because of femur was significantly contracted, we trialed with a  60C high offset with a  -6 femoral head. This had excellent stability. An XR was taken, which showed equal leg lengths and offset. We decided to use  these sizes for our real components.     All trials were then removed. We inserted a real high wall 64x36 liner in the acetabulum, with the lip at the 10 o'clock position. We then inserted the 60C high offset proximal femoral body, and screwed it in place. Next, we impacted on a ceramic 36mm -6 femoral head. The hip was then reduced.     The wound was then irrigated with copious amounts of normal saline. A 15 [place] GUERRERO drain was placed into the hip joint. The capsule was closed with #1 ethibond sutures. The fascia was then closed with #1 eithibond. 0 Vicryl was used to close the deep layer, 2-0 vicryl was used for the subcutanous layer, and 3-0  PDS was used for skin. The wound was then dressed with dermabond, alginate, and tegaderm. The drapes were then removed, and an abduction pillow was placed in between the patient's legs.     The patient was then awoken from anesthesia without any complications.  The patient was then transferred onto the hospital bed and taken to the PACU without any complications.  Multiple sponge and needle counts were performed and were correct at the end of the case.  Dr. Meyers was present and participated throughout the key portions of the procedure.    The -22 modifier is appended to the CPT coding for this surgery due to the following reasons:  1) The was the fifth operation on this hip and the surgical bed was scarred.  2) The patient has a complex medical history including obesity, DM, RA, DVT, PE  3) Increased operative time, >50%  than usual: 5 hours.     Postoperative plan:  Plan:  50% PWB LLE with posterior hip precautions for 3 months  Postop abx: ancef x24h  Postop XR  F/U OR cultures  F/U Drain  Pain control  DVT ppx: Eliquis  PT/OT, OOB  Appreciate medicine recs  F/U 4 weeks  Dispo planning    Angelito Garcia MD  Adult Reconstructive Surgery Fellow  Department of Orthopaedic Surgery, [facility]    Attending MD (Dr. Rom Meyers) Attestation:  I was present during the key  portions of the procedure and I was immediately available for the entire procedure between opening and closing.    Rom Meyers MD [facility] Professor  Oncology and Adult Reconstructive Surgery  Dept Orthopaedic Surgery, [facility]

## 2023-05-27 NOTE — PROGRESS NOTES
Orthopaedic Surgery Progress Note 05/27/2023    S: No acute events overnight.  Pain is manageable. Denies numbness or tingling. Denies chest pain, SOB, nausea/vomiting. Denies fevers/chills. Denies any other concerns.     O:  Temp: (!) 96.4  F (35.8  C) Temp src: Oral BP: (!) 116/37 Pulse: 75   Resp: 14 SpO2: 95 % O2 Device: Nasal cannula Oxygen Delivery: 3 LPM    Exam:  Gen: No acute distress, resting comfortably in bed.  Resp: Non-labored breathing    LE:  Dressings c/d/i   Drain in place - 400cc since surgery  Abduction pillow in place  Fires GS/TS/FHL/EHL  SILT SP/DP/Saph/Sural/T  2+ DP/PT, WWP    Recent Labs   Lab 05/27/23  0714 05/26/23  0753 05/25/23  0644   WBC  --  9.5 9.6   HGB 9.9* 10.5* 10.4*   PLT  --  309 295       Assessment: Waldo Bustos is a 72 year old male s/p L BELLA on 5/26. Doing well.    Plan:  50% PWB LLE with posterior hip precautions for 3 months  Postop abx: ancef x24h  F/U OR cultures  F/U Drain  Pain control  DVT ppx: Eliquis  PT/OT, OOB  Appreciate medicine recs: Glucose <200  F/U 4 weeks  Dispo planning    Future Appointments   Date Time Provider Department Center   5/27/2023  9:30 AM Ann Martinez PT [facility] [place]   5/27/2023  1:00 PM Margot Luo, BAILEY [facility] [place]   5/27/2023  3:30 PM UR PT WAITLIST [facility] [place]   6/26/2023 10:20 AM Rom Meyers MD [facility]       Angelito Garcia MD  Adult Reconstructive Surgery Fellow  Department of Orthopaedic Surgery, [facility]

## 2023-05-27 NOTE — PROGRESS NOTES
"   05/27/23 1322   Appointment Info   Signing Clinician's Name / Credentials (OT) Hi Nava OTR/L   Living Environment   People in Home significant other   Current Living Arrangements house   Home Accessibility stairs to enter home;stairs within home   Number of Stairs, Main Entrance 2   Stair Railings, Main Entrance none   Number of Stairs, Within Home, Primary   (1 step to the main level, then 6-7 up (bedroom, shower tub combo) /down (family room, bathroom with walk-in shower and elevated toilet), a full flight down from the lower \"family room\" level to the basement (laundry))   Stair Railings, Within Home, Primary railings safe and in good condition   Transportation Anticipated family or friend will provide   Living Environment Comments Lives with girlfriend who pt. reports has difficulty with mobility in 4 level home. Multiple staircases. no bathroom or bedroom on main. pt has shower chair, commode, hip kit and walker at home   Self-Care   Usual Activity Tolerance fair   Current Activity Tolerance poor   Regular Exercise No   Equipment Currently Used at Home wheelchair, manual;cane, straight;walker, rolling;wheelchair, power;grab bar, toilet;grab bar, tub/shower;shower chair   Fall history within last six months no   Activity/Exercise/Self-Care Comment Pt has been hospitalized for past 6 months due to complications with BELLA. Reports being predominantly bed bound and total lift dependent for past 6 months.   Instrumental Activities of Daily Living (IADL)   Previous Responsibilities meal prep;housekeeping;laundry;shopping   IADL Comments Perviously IND with IADL yet has been hosp;italized and depndent for greater than 6 months   General Information   Onset of Illness/Injury or Date of Surgery 05/26/23   Referring Physician Angelito Garcia MD   Patient/Family Therapy Goal Statement (OT) eventual return home   Additional Occupational Profile Info/Pertinent History of Current Problem 72 year old male with history " of morbid obesity, diabetes, hyperlipidemia, JAIR, PE, DVT, venous insufficiency, osteoarthritis, chronic left hip prosthetic joint infection, recent admission to [facility] on 1120 2/2022 for explantation of left hip prosthesis, debridement, reconstruction with antibiotic spacer, toxic encephalopathy, acute hypoxic respiratory failure due to obesity hypoventilation syndrome, acute blood loss anemia, HALEY, physical deconditioning is being admitted to Ortho service status post explantation of left hip antibiotic spacer, revision left total hip arthroplasty on 5/26/2023.  Medicine has been consulted for extensive medical comorbidities.   Existing Precautions/Restrictions no hip ADD past midline;90 degree hip flexion;weight bearing  (No internal rotation)   Left Lower Extremity (Weight-bearing Status) weight-bearing as tolerated (WBAT)   Cognitive Status Examination   Orientation Status orientation to person, place and time   Pain Assessment   Patient Currently in Pain Yes, see Vital Sign flowsheet   Range of Motion Comprehensive   General Range of Motion bilateral upper extremity ROM WFL   Comment, General Range of Motion Decreased cervical ROM with rotation to left side.   Strength Comprehensive (MMT)   Comment, General Manual Muscle Testing (MMT) Assessment UE WFL   Bed Mobility   Bed Mobility rolling left   Assistive Device (Bed Mobility) lift device   Transfers   Transfers sit-stand transfer   Sit-Stand Transfer   Sit-Stand [place] (Transfers) dependent (less than 25% patient effort)  (Total bodyy lift)   Activities of Daily Living   BADL Assessment/Intervention upper body dressing;lower body dressing   Upper Body Dressing Assessment/Training   Bradford Level (Upper Body Dressing) dependent (less than 25% patient effort)   Lower Body Dressing Assessment/Training   [place] Level (Lower Body Dressing) dependent (less than 25% patient effort)   Toileting   Bradford Level (Toileting) dependent (less  than 25% patient effort)   Clinical Impression   Criteria for Skilled Therapeutic Interventions Met (OT) Yes, treatment indicated   OT Diagnosis Decreased Strength and mobility for ADl with post surgical precautions.   OT Problem List-Impairments impacting ADL problems related to;activity tolerance impaired;flexibility;mobility;range of motion (ROM);pain;post-surgical precautions   Assessment of Occupational Performance 5 or more Performance Deficits   Identified Performance Deficits Self care ADL g/h bathing, toileting, dressing, IADL   Planned Therapy Interventions (OT) ADL retraining;IADL retraining;balance training;groups;manual therapy;motor coordination training;ROM;strengthening;transfer training;home program guidelines;progressive activity/exercise;risk factor education   Clinical Decision Making Complexity (OT) moderate complexity   Risk & Benefits of therapy have been explained care plan/treatment goals reviewed;evaluation/treatment results reviewed;risks/benefits reviewed;current/potential barriers reviewed;participants voiced agreement with care plan;participants included;patient   OT Total Evaluation Time   OT Eval, Moderate Complexity Minutes (05274) 20   OT Goals   Therapy Frequency (OT) 5 times/wk   OT Predicted Duration/Target Date for Goal Attainment 06/09/23   OT Goals Hygiene/Grooming;Upper Body Dressing;Lower Body Dressing;Upper Body Bathing;Lower Body Bathing;Bed Mobility;Transfers;Toilet Transfer/Toileting   OT: Hygiene/Grooming minimal assist  (From EOB sit)   OT: Upper Body Dressing Supervision/stand-by assist   OT: Lower Body Dressing Minimal assist;using adaptive equipment;within precautions   OT: Bed Mobility Minimal assist   OT: Transfer Moderate assist   OT: Toilet Transfer/Toileting Moderate assist;within precautions   Therapeutic Procedures/Exercise   Therapeutic Procedure: strength, endurance, ROM, flexibillity minutes (81096) 15   Treatment Detail/Skilled Intervention Eval  completed. Due to current immobility, pt. enaged in resumption of bed based resistive band exercises. PT given green resistive band. Instructed in two movements. Cued to wrap band around uppoer portion of bedrail, and completed shldr protraction 2 sets x  12. cue to alterante and rotate to remove band and place on opposite bedrail for oppsite UE protraction. Cues were repeated for horizontal abduction. Pt. encouraged to rotate head and incorparoate cervical ROM into set up of band exercise. Instructed to complete again this evening.   Symptoms Noted During/After Treatment none   Therapeutic Activities   Therapeutic Activity Minutes (99868) 12   Treatment Detail/Skilled Intervention During evaluation pt noted to have cervical ROM deficit with roatational movement to left side. He reports that neck has gotten stiff with proplomnged bed rest. Pt was cued to complete slow roations movements of head to right and then back to left x 10 repitions. Instructed to attempt slow cervical rotation while resting in bed. Educated on contraindication of rapid movement.   Symptoms noted during/after treatment fatigue   OT Discharge Planning   OT Plan EOB sit with a x2 HOB elevated for UE AROM and g/h.   OT Discharge Recommendation (DC Rec) Transitional Care Facility   OT Rationale for DC Rec Pt is significantly below baseline LOF after prolonged antibiotic tx and bed rest. He will benefit from progression of mobility via IP OT to improve activity tolerance and strength. To attempt return to PLOF, pt will require TCU stay for mobility and ADL retraining.   OT Brief overview of current status Resuming UE theraband exercise. Total body lift assist for mobility.   Total Session Time   Timed Code Treatment Minutes 27   Total Session Time (sum of timed and untimed services) 47

## 2023-05-27 NOTE — PROGRESS NOTES
05/27/23 1111   Appointment Info   Signing Clinician's Name / Credentials (PT) Ann Martinez, DESIREE   Living Environment   People in Home significant other   Current Living Arrangements house   Home Accessibility stairs to enter home;stairs within home   Number of Stairs, Main Entrance 2   Number of Stairs, Within Home, Primary seven;six   Living Environment Comments Pt lives in a split level home. There are 2 steps to enter and then 1 step to maint level. Inside there are 6 steps up to the bedroom and bathroom or  6 steps down to the family room.   Self-Care   Usual Activity Tolerance poor   Current Activity Tolerance poor  (Pt has been in TCU since November)   Activity/Exercise/Self-Care Comment Pt has been bed bond for the past sx months and required total A of a lift to get out of bed.   General Information   Onset of Illness/Injury or Date of Surgery 05/26/23   Referring Physician Rom Meyers MD   Pertinent History of Current Problem (include personal factors and/or comorbidities that impact the POC) 72 year old male  is being admitted to Ortho service status post explantation of left hip antibiotic spacer, revision left total hip arthroplasty on 5/26/2023. PMHx of morbid obesity, diabetes, hyperlipidemia, JAIR, PE, DVT, venous insufficiency, osteoarthritis, chronic left hip prosthetic joint infection, recent admission to [facility] on 11/2022 for explantation of left hip prosthesis, debridement, reconstruction with antibiotic spacer, toxic encephalopathy, acute hypoxic respiratory failure due to obesity hypoventilation syndrome, acute blood loss anemia, HALEY, physical deconditioning.   Existing Precautions/Restrictions no hip IR;no hip ADD past midline;90 degree hip flexion;fall   Weight-Bearing Status - RLE partial weight-bearing (% in comments)  (50% WB)   Cognition   Affect/Mental Status (Cognition) WFL   Follows Commands (Cognition) WFL   Pain Assessment   Patient Currently in Pain Yes, see Vital Sign  flowsheet   Integumentary/Edema   Integumentary/Edema other (describe)   Integumentary/Edema Comments   (due to surgery)   Range of Motion (ROM)   Range of Motion ROM deficits secondary to surgical procedure;ROM deficits secondary to pain;ROM deficits secondary to weakness   ROM Comment Pt had very limited tolerance for knee and hip flexion in supine. Because the pt is limited by pain, could not deteremine full ROM.   Bed Mobility   Bed Mobility rolling left;rolling right;supine-sit;sit-supine;scooting/bridging   Rolling Left [place] (Bed Mobility) 2 person assist;dependent (less than 25% patient effort)   Rolling Right [place] (Bed Mobility) 2 person assist;dependent (less than 25% patient effort)   Scooting/Bridging [place] (Bed Mobility) unable to perform   Supine-Sit [place] (Bed Mobility) unable to perform   Sit-Supine [place] (Bed Mobility) unable to perform   Bed Mobility Limitations decreased ability to use legs for bridging/pushing   Impairments Contributing to Impaired Bed Mobility pain;decreased strength;decreased ROM   Assistive Device (Bed Mobility) bed rails   Comment, (Bed Mobility) Pt was limited by pain.   Transfers   Comment, (Transfers) Not safe to test at this time. Pt was total A prior to surgery.   Gait/Stairs (Locomotion)   [place] Level (Gait) not tested   Balance   Balance other (describe)   Clinical Impression   Criteria for Skilled Therapeutic Intervention Yes, treatment indicated   PT Diagnosis (PT) R hip revison, weakness   Influenced by the following impairments decreased strength, decreased AROM. pain, poor activity tolerance   Functional limitations due to impairments bed mobility, transfer, gait   Clinical Presentation (PT Evaluation Complexity) Stable/Uncomplicated   Clinical Presentation Rationale per medical diagnosis   Clinical Decision Making (Complexity) moderate complexity   Planned Therapy Interventions (PT) bed mobility training;balance  training;gait training;strengthening;ROM (range of motion);stretching;transfer training   Risk & Benefits of therapy have been explained evaluation/treatment results reviewed;patient   PT Total Evaluation Time   PT Eval, Moderate Complexity Minutes (21487) 8   Physical Therapy Goals   PT Frequency 5x/week   PT Predicted Duration/Target Date for Goal Attainment 06/03/23   PT Goals Bed Mobility;Transfers   PT: Bed Mobility Maximum assist   PT: Transfers Maximum assist;Sit to/from stand   PT: Goal 1 Pt will sit EOB for 5 minutes with min A.   Therapeutic Procedure/Exercise   Ther. Procedure: strength, endurance, ROM, flexibillity Minutes (08788) 13   Treatment Detail/Skilled Intervention Instructed and assisted pt with LE supine strengthening exercises of ankle pumps, heel slides, hip abduction, quad sets and glut sets. Pt had very limited ROM with heel slides due to pain.   Symptoms Noted During/After Treatment increased pain   Therapeutic Activity   Therapeutic Activities: dynamic activities to improve functional performance Minutes (14671) 20   Symptoms Noted During/After Treatment Increased pain   Treatment Detail/Skilled Intervention Pt rolled to R and L with max A of 2. Pt had verbal and tactile cues tu grab bed rails and use UE. Pt had poor tolerance for rolling and  reported pain with rolling that supine to sit was not attempted at this time. Educated pt on hip precautions and wb status. Needs further review.   PT Discharge Planning   PT Plan bed mobility, ROM   PT Discharge Recommendation (DC Rec) Transitional Care Facility   PT Rationale for DC Rec Pt is very deconditioned with very limited mobility and tolerance. He requires total A for all cares.   PT Brief overview of current status total A   Total Session Time   Timed Code Treatment Minutes 33   Total Session Time (sum of timed and untimed services) 41

## 2023-05-28 NOTE — PROGRESS NOTES
"Care Management Follow Up    Length of Stay (days): 2    Expected Discharge Date: 5/29/23     Concerns to be Addressed: Discharge planning     Patient plan of care discussed at interdisciplinary rounds: Yes    Anticipated Discharge Disposition: Transitional Care     Anticipated Discharge Services: None  Anticipated Discharge DME: Other (see comment) (TBD)    Patient/family educated on Medicare website which has current facility and service quality ratings: Yes  Education Provided on the Discharge Plan: Yes, spoke with patient   Patient/Family in Agreement with the Plan: Unable to assess    Referrals Placed by CM/SW: External Care Coordination  Private pay costs discussed: Not applicable    Additional Information:  Social work stopped by patients room to discuss recommendations for TCU. Patient states he recently was at  TCU for approx 5.5 months. He shared about his experience which he reported was poor stating he did not get much PT/OT while he was there. SW asked patient if he would like to return to  TCU if the recs remain TCU. Patient was unsure at this time and asked if  TCU would \"even take him back\". SW told him that I was unsure but that we could reach out and see if they would be open to that. SW reached out to  TCU admissions (Samaria) and asked if they would review patient. Per Samaria in admissions, they have patient on their radar and Samaria will escalate to leadership as patient was at the facility for a long tjme. Samaria was also unsure if patient had any TCU days left and that he may need to go on MA moving forward. SW also provided patient with a [facility] list of TCUs near [place] and left in patients room. Patient seemed quite down about being recommended to go back to TCU and wasn't up for giving any preferences at this time. SW to follow up with patient on Monday or Tuesday regarding next steps. Social work will continue to follow.     Pending:  [facility]  [address] [place], [place]  " [address]  P: [phone]  F: [phone]    LIMA Beltran, LGSW  5 Med Surg and 10 ICU   [facility]  Phone: [phone]  Pager: [phone]

## 2023-05-28 NOTE — PROGRESS NOTES
[facility]    Medicine Progress Note - Hospitalist Service, GOLD TEAM 16    Date of Admission:  5/26/2023    Assessment & Plan      72 year old male with history of morbid obesity, diabetes, hyperlipidemia, JAIR, PE, DVT, venous insufficiency, osteoarthritis, chronic left hip prosthetic joint infection, recent admission to [facility] on 1120 2/2022 for explantation of left hip prosthesis, debridement, reconstruction with antibiotic spacer, toxic encephalopathy, acute hypoxic respiratory failure due to obesity hypoventilation syndrome, acute blood loss anemia, HALEY, physical deconditioning is being admitted to Ortho service status post explantation of left hip antibiotic spacer, revision left total hip arthroplasty on 5/26/2023.  Medicine has been consulted for extensive medical comorbidities.     # status post explantation of left hip antibiotic spacer, revision left hip total arthroplasty on 5/26/2023:  Management primarily per Ortho team.  - Wound cares, Dressings, Surgical pain management, DVT Prophylaxis  per primary team.   - Monitor anemia, hemodynamics, Input/Output, Capnography (for 24 hours)  - Encourage Incentive spirometry  - Laxatives for constipation prophylaxis  -Follow-up on culture data.     # Anemia: Most likely due to blood loss from recent surgery.  And anemia of chronic inflammation.  Hemoglobin 10.5 g/dL on 5/26/2023.  Estimated blood loss 500 mL  > Hg 9.9 gm/dl on 05/27/2023  >Hemoglobin 8.8 on 5/28/2023  Plan:  - Transfuse if Hg < 7 gm/dl      # Renal insufficiency: Creatinine level 1.4 on 5/27/2023.  Does not exactly meet the criteria for HALEY.  Creatinine level 1.19 on 5/20/2023  Plan:  - BMP in a.m.  - Avoid nephrotoxins.  - LR infusion  - Renal dose medications  - Avoid hypotension, hypovolemia  - Strict I and O      # Diabetes mellitus type 2 (A1c 5.8% 3/6/23)  PTA glipizide discontinued d/t hypoglycemia risk.   >Diabetic medications (insulin  "aspart medium intensity correction QID, Metformin, Actos and Victoza) were held on the the morning of surgery  >Blood glucose levels reasonably controlled  Plan:  - Continue to hold oral hypoglycemic agent.  - Continue insulin aspart medium intensity correction before meals and bedtime.  - Monitor and titrate as needed.     # Hyperlipidemia  > Prior to admission on atorvastatin 40 mg daily.  > Stable  Plan:  - Continue atorvastatin     # Obstructive sleep apnea  Uses CPAP at night  Plan:  Continue CPAP  - Monitor on pulse oximetry and capnography per postop protocol     # Unprovoked DVT in L common femoral, deep and superficial femoral, popliteal, posterior tibial veins and PE (2018) on chronic anticoagulation  >Prior to admission apixaban.  >Apixaban has been held in preparation for surgery.  >Apixaban has been started  Plan:  -Continue apixaban        # Depression vs. Adjustment Disorder with Depressed Mood  Depressed mood in setting of ongoing health issues. Passive suicidal statements to nursing staff in the past. Not interested in medication therapy or psychiatry consultation.   - Health psychology was not available to follow in the rehab.  Consider reinitiating post discharge when Health Psychology is able to connect with him.     # Right elbow, shoulder numbness:  Likely positional.  Strength 5 x 5.  Sensation intact.  Plan:  - Continue to monitor.             Diet: Advance Diet as Tolerated: Regular Diet Adult    DVT Prophylaxis: DOAC  Tran Catheter: Not present  Lines: None     Cardiac Monitoring: None  Code Status: Full Code      Clinically Significant Risk Factors                         # Obesity: Estimated body mass index is 34.91 kg/m  as calculated from the following:    Height as of this encounter: 1.778 m (5' 10\").    Weight as of this encounter: 110.4 kg (243 lb 4.8 oz)., PRESENT ON ADMISSION          Disposition Plan     Expected Discharge Date: 05/28/2023                  Aditya Stallworth, " MD  Hospitalist Service, GOLD TEAM 16  [facility]  Securely message with [facility] (more info)  Text page via [facility] Paging/Directory   See signed in provider for up to date coverage information  ______________________________________________________________________    Interval History   Overnight events reviewed.  Patient reports doing well this morning.  Patient pain is controlled  Denies any nausea vomiting chest pain or shortness of breath.    Physical Exam   Vital Signs: Temp: (!) 96.1  F (35.6  C) Temp src: Oral BP: 104/49 Pulse: 75   Resp: 12 SpO2: 100 % O2 Device: None (Room air)    Weight: 243 lbs 4.79 oz    General Appearance: Laying in bed in no acute distress.  Respiratory: B/L CTA  Cardiovascular: S1, S2 normal.   GI: Soft, NT, BS+  Skin:   Other:     Medical Decision Making       **CLEAR ALL SELECTIONS**      Data   ------------------------- PAST 24 HR DATA REVIEWED -----------------------------------------------    I have personally reviewed the following data over the past 24 hrs:    N/A  \   8.8 (L)   / N/A     135 (L) 101 41.0 (H) /  127 (H)   4.5 25 1.19 (H) \       Imaging results reviewed over the past 24 hrs:   No results found for this or any previous visit (from the past 24 hour(s)).

## 2023-05-28 NOTE — PLAN OF CARE
Pt is A&Ox4. VSS. LS clear, on RA. BS active, LBM on 5/23/2023. Voiding well. Pain managed with oxycodone. L hip surgical dressing is c/d/I. GUERRERO patent and draining well. Pt has not been oob. L PIV is patent and infusing LR at 125ml/hr. Continue to monitor

## 2023-05-28 NOTE — PROGRESS NOTES
Orthopaedic Surgery Progress Note 05/28/2023    S: No acute events overnight.  Pain is manageable. Denies numbness or tingling. Denies chest pain, SOB, nausea/vomiting. Denies fevers/chills. Denies any other concerns. Still have not gotten out of bed yet.    O:  Temp: (!) 96.1  F (35.6  C) Temp src: Oral BP: 104/49 Pulse: 75   Resp: 12 SpO2: 100 % O2 Device: None (Room air)      Exam:  Gen: No acute distress, resting comfortably in bed.  Resp: Non-labored breathing    LE:  Dressings c/d/i   Drain in place - 135cc overnight  Abduction pillow in place  Fires GS/TS/FHL/EHL  SILT SP/DP/Saph/Sural/T  2+ DP/PT, WWP    Recent Labs   Lab 05/28/23  0538 05/27/23  0714 05/26/23  0753 05/25/23  0644   WBC  --   --  9.5 9.6   HGB 8.8* 9.9* 10.5* 10.4*   PLT  --   --  309 295       Assessment: Waldo Bustos is a 72 year old male s/p L BELLA on 5/26. Doing well.    Plan:  50% PWB LLE with posterior hip precautions for 3 months  Postop abx: ancef x24h  F/U OR cultures: NGTD  F/U Drain  Pain control  DVT ppx: Eliquis  PT/OT, OOB  Appreciate medicine recs: Glucose <200  F/U 4 weeks  Dispo planning    Future Appointments   Date Time Provider Department Center   5/27/2023  9:30 AM Ann Martinez, PT URPT [place]   5/27/2023  1:00 PM Margot Luo OT UROT [place]   5/27/2023  3:30 PM UR PT WAITLIST URPT [place]   6/26/2023 10:20 AM Rom Meyers MD [facility]       Angelito Garcia MD  Adult Reconstructive Surgery Fellow  Department of Orthopaedic Surgery, [facility]

## 2023-05-28 NOTE — PROGRESS NOTES
"  VS: /41 (BP Location: Left arm)   Pulse 87   Temp (!) 95.6  F (35.3  C) (Axillary)   Resp 14   Ht 1.778 m (5' 10\")   Wt 110.4 kg (243 lb 4.8 oz)   SpO2 96%   BMI 34.91 kg/m     O2: RA, lung sounds clear and equal, denies SOB and CP   Output: Voiding without difficulty in urinal   Last BM: 5/23 per pt, declining stool softners   Activity: A2, not OOB this shift   Skin: Blanchable redness to sacrum, L hip surgical incision   Pain: Managed with prn iv dilaudid 0.2 mg   Neuro: A/Ox4, denies n/t  Dependent edema BLE   Dressing: CDI   Diet: Regular, BG checks,  @1740   LDA: R PIV infusing LR 50mL/hr, L hip bulb [place] drain    Equipment: IV pole, personal belongings, call light within reach   Plan: Continue with plan of care, SW following for TCU placement   Additional Info:        "

## 2023-05-28 NOTE — PLAN OF CARE
"Goal Outcome Evaluation:      VS: /49 (BP Location: Right arm, Patient Position: Supine, Cuff Size: Adult Large)   Pulse 75   Temp (!) 96.1  F (35.6  C) (Oral)   Resp 12   Ht 1.778 m (5' 10\")   Wt 110.4 kg (243 lb 4.8 oz)   SpO2 100%   BMI 34.91 kg/m     O2: Stable on RA.   Output: Void  in the urinal with out difficulty.   Last BM: 5/23, pt refused to take stool softer.   Activity: Pt did not get out of bed.    Skin: Inter dry under abdominal, surgical dressing CDI.   Pain: Po oxycodone and tylenol.   Neuro: AOx4.    Dressing: CDI.   Diet: Regular diet, and diabetic.   LDA: PIV infusing @ 50mL.   Equipment: Call light with in reach.    Plan: TBD.    Additional Info:       "

## 2023-05-28 NOTE — PHARMACY-ADMISSION MEDICATION HISTORY
Pharmacist Admission Medication History    Admission medication history is complete. The information provided in this note is only as accurate as the sources available at the time of the update.    Medication reconciliation/reorder completed by provider prior to medication history? Yes    Information Source(s): discharge summary, dispense record via chart review.    Pertinent Information: Patient is not a good candidate due to current state.. PTA med list, excluding OTC/herbal supplements, is completed to the best of my ability using the available sources listed above. Unable to confirm adherence and and last dose taken at this time.     Changes made to PTA medication list:    Added: apixaban; metformin; Victoza; pioglitazone; tramadol    Deleted: None    Changed: None    Allergies reviewed with patient and updates made in EHR: unable to assess    Medication History Completed By: Crow Lema [facility] 5/28/2023 4:00 PM    Prior to Admission medications    Medication Sig Last Dose Taking? Auth Provider Long Term End Date   acetaminophen (TYLENOL) 325 MG tablet Take 2 tablets (650 mg) by mouth every 4 hours as needed for other (For optimal non-opioid multimodal pain management to improve pain control.) Unknown Yes Jessica Hodge APRN CNS No    apixaban ANTICOAGULANT (ELIQUIS) 5 MG tablet Take 5 mg by mouth 2 times daily Unknown Yes Unknown, Entered By History No    atorvastatin (LIPITOR) 40 MG tablet Take 40 mg by mouth daily Unknown Yes Reported, Patient Yes    furosemide (LASIX) 20 MG tablet Take 20 mg by mouth daily Unknown Yes Unknown, Entered By History No    glipiZIDE (GLUCOTROL XL) 10 MG 24 hr tablet Take 10 mg by mouth daily Unknown Yes Unknown, Entered By History No    lactobacillus rhamnosus, GG, (CULTURELL) capsule Take 1 capsule by mouth 2 times daily Unknown Yes Maris Foster MD     liraglutide (VICTOZA) 18 MG/3ML solution Inject 1.8 mg Subcutaneous daily Unknown Yes Unknown,  Entered By History No    metFORMIN (GLUCOPHAGE XR) 500 MG 24 hr tablet Take 2,000 mg by mouth daily Unknown Yes Unknown, Entered By History No    methocarbamol (ROBAXIN) 500 MG tablet Take 1 tablet (500 mg) by mouth every 6 hours as needed for muscle spasms Unknown Yes Jessica Hodge APRN CNS     miconazole (MICATIN) 2 % external powder Apply topically 2 times daily Unknown Yes Maris Foster MD     pioglitazone (ACTOS) 45 MG tablet Take 45 mg by mouth daily Unknown Yes Unknown, Entered By History No    polyethylene glycol (MIRALAX) 17 g packet Take 17 g by mouth daily Unknown Yes Jessica Hodge APRN CNS     senna-docusate (SENOKOT-S/PERICOLACE) 8.6-50 MG tablet Take 2 tablets by mouth 2 times daily Unknown Yes Jessica Hodge APRN CNS     traMADol (ULTRAM) 50 MG tablet Take 50 mg by mouth every 6 hours as needed for severe pain Unknown Yes Unknown, Entered By History No

## 2023-05-29 NOTE — PROGRESS NOTES
"  VS: /56 (BP Location: Left arm)   Pulse 72   Temp (!) 96.7  F (35.9  C) (Oral)   Resp 16   Ht 1.778 m (5' 10\")   Wt 110.4 kg (243 lb 4.8 oz)   SpO2 100%   BMI 34.91 kg/m     O2: Room air saturations 100%.    Output:    Last BM: 5/26/2023 reports patient   Activity: Pt declined to get up this am. Pt preferred to sleep in.    Skin: Very flaky skin on bottom of left foot. Applied lotion to both lower legs. Pt decline to get washed up this am.    Pain: Pt states\" I am having pain in my left ankle this morning.\"   CMS: Intact   Dressing: Left hip dressing CDI. J P to suction    Diet: Pt declined to order breakfast this am. Blood sugar was 151 before breakfast.    LDA: IV SL this am.    Equipment: Lift room.   Plan: Pt states\" I have been over in rehab for the past 5 months. But normally I live with my girl friend in [place]\" Pt seems reluctant to help with ADLS this am.    Additional Info: Ortho team followed today. Placed orders to try to bend left knee to remove pressure from sciatica nerve. Called [phone] ( [place] ) to check and see if recliner chair was ordered for patient. They were unable to verify due to Holiday weekend.        "

## 2023-05-29 NOTE — PLAN OF CARE
Pt is A&Ox4. VSS. LS clear, on RA. BS active, LBM on 5/23/2023. Voiding well. Pain managed with oxycodone. L hip surgical dressing is c/d/I. GUERRERO patent and draining well. Pt up with 2 assist. L PIV is patent and infusing LR at 50ml/hr. Continue to monitor

## 2023-05-29 NOTE — PROGRESS NOTES
Orthopaedic Surgery Progress Note 05/29/2023    S: Noticed weakness in L ankle and toe dorsiflexion last night. States that he was able to do ankle pumps since surgery, but has gotten more difficult yesterday evening   Pain is manageable. Denies numbness or tingling. Denies chest pain, SOB, nausea/vomiting. Denies fevers/chills. Denies any other concerns.     O:  Temp: (!) 96.7  F (35.9  C) Temp src: Oral BP: 123/56 Pulse: 72   Resp: 16 SpO2: 100 % O2 Device: None (Room air)      Exam:  Gen: No acute distress, resting comfortably in bed.  Resp: Non-labored breathing    LE:  Dressings c/d/i   Drain in place - 125cc overnight  Abduction pillow in place  Fires GS/FHL, 1+ in TA and EHL  SILT SP/DP/Saph/Sural/T  2+ DP/PT, WWP    Recent Labs   Lab 05/28/23  0538 05/27/23  0714 05/26/23  0753 05/25/23  0644   WBC  --   --  9.5 9.6   HGB 8.8* 9.9* 10.5* 10.4*   PLT  --   --  309 295       Assessment: Waldo Bustos is a 72 year old male s/p L BELLA on 5/26. Doing well, but developing foot drop. Sensation is normal.    Plan:  50% PWB LLE with posterior hip precautions for 3 months  Continue to monitor foot drop  Keep knee bent in a foam leg elevator to relax the sciatic nerve  F/U OR cultures: NGTD  F/U Drain  Pain control  DVT ppx: Eliquis  PT/OT, OOB  Appreciate medicine recs: Glucose <200  F/U 4 weeks  Dispo planning    Future Appointments   Date Time Provider Department Center   5/27/2023  9:30 AM Ann Martinez PT URPT [place]   5/27/2023  1:00 PM Margot Luo OT UROT [place]   5/27/2023  3:30 PM UR PT WAITLIST URPT [place]   6/26/2023 10:20 AM Rom Meyers MD [facility]       Angelito Garcia MD  Adult Reconstructive Surgery Fellow  Department of Orthopaedic Surgery, [facility], located within [facility]

## 2023-05-29 NOTE — PROGRESS NOTES
[facility]    Medicine Progress Note - Hospitalist Service, GOLD TEAM 16    Date of Admission:  5/26/2023    Assessment & Plan      72 year old male with history of morbid obesity, diabetes, hyperlipidemia, JAIR, PE, DVT, venous insufficiency, osteoarthritis, chronic left hip prosthetic joint infection, recent admission to [facility] on 1120 2/2022 for explantation of left hip prosthesis, debridement, reconstruction with antibiotic spacer, toxic encephalopathy, acute hypoxic respiratory failure due to obesity hypoventilation syndrome, acute blood loss anemia, HALEY, physical deconditioning is being admitted to Ortho service status post explantation of left hip antibiotic spacer, revision left total hip arthroplasty on 5/26/2023.  Medicine has been consulted for extensive medical comorbidities.     # status post explantation of left hip antibiotic spacer, revision left hip total arthroplasty on 5/26/2023:  Management primarily per Ortho team.  - Wound cares, Dressings, Surgical pain management, DVT Prophylaxis  per primary team.   - Monitor anemia, hemodynamics, Input/Output, Capnography (for 24 hours)  - Encourage Incentive spirometry  - Laxatives for constipation prophylaxis  -Follow-up on culture data.     # Anemia: Most likely due to blood loss from recent surgery.  And anemia of chronic inflammation.  Hemoglobin 10.5 g/dL on 5/26/2023.  Estimated blood loss 500 mL  > Hg 9.9 gm/dl on 05/27/2023  >Hemoglobin 8.8 on 5/28/2023  Plan:  - Transfuse if Hg < 7 gm/dl      # Renal insufficiency: Creatinine level 1.4 on 5/27/2023.  Does not exactly meet the criteria for HALEY.  Creatinine level 1.19 on 5/20/2023, Creatinine level 1.09 on 05/29  Plan:  - Avoid nephrotoxins.  - Encourage PO intake  - Renal dose medications  - Avoid hypotension, hypovolemia  - Strict I and O      # Diabetes mellitus type 2 (A1c 5.8% 3/6/23)  PTA glipizide discontinued d/t hypoglycemia risk.   >Diabetic  "medications (insulin aspart medium intensity correction QID, Metformin, Actos and Victoza) were held on the the morning of surgery  >Blood glucose levels reasonably controlled  Plan:  - Continue to hold oral hypoglycemic agent.  - Continue insulin aspart medium intensity correction before meals and bedtime.  - Monitor and titrate as needed.     # Hyperlipidemia  > Prior to admission on atorvastatin 40 mg daily.  > Stable  Plan:  - Continue atorvastatin     # Obstructive sleep apnea  Uses CPAP at night  Plan:  Continue CPAP  - Monitor on pulse oximetry and capnography per postop protocol     # Unprovoked DVT in L common femoral, deep and superficial femoral, popliteal, posterior tibial veins and PE (2018) on chronic anticoagulation  >Prior to admission apixaban.  >Apixaban has been held in preparation for surgery.  >Apixaban has been started  Plan:  -Continue apixaban        # Depression vs. Adjustment Disorder with Depressed Mood  Depressed mood in setting of ongoing health issues. Passive suicidal statements to nursing staff in the past. Not interested in medication therapy or psychiatry consultation.   - Health psychology was not available to follow in the rehab.  Consider reinitiating post discharge when Health Psychology is able to connect with him.     # Right elbow, shoulder numbness:  Likely positional.  Strength 5 x 5.  Sensation intact.  Plan:  - Continue to monitor.             Diet: Advance Diet as Tolerated: Regular Diet Adult    DVT Prophylaxis: DOAC  Tran Catheter: Not present  Lines: None     Cardiac Monitoring: None  Code Status: Full Code      Clinically Significant Risk Factors                         # Obesity: Estimated body mass index is 34.91 kg/m  as calculated from the following:    Height as of this encounter: 1.778 m (5' 10\").    Weight as of this encounter: 110.4 kg (243 lb 4.8 oz)., PRESENT ON ADMISSION          Disposition Plan           Aditya Stallworth MD  Hospitalist Service, GOLD TEAM " 16  [facility]  Securely message with [employer] (more info)  Text page via [employer] Paging/Directory   See signed in provider for up to date coverage information  ______________________________________________________________________    Interval History     Overnight events reviewed  Reports doing well  No nausea, vomiting,     Physical Exam   Vital Signs: Temp: (!) 96.7  F (35.9  C) Temp src: Oral BP: 123/56 Pulse: 72   Resp: 16 SpO2: 100 % O2 Device: None (Room air)    Weight: 243 lbs 4.79 oz    General Appearance: Laying in bed in no acute distress.  Respiratory: B/L CTA  Cardiovascular: S1, S2 normal.   GI: Soft, NT, BS+  Skin:   Other:     Medical Decision Making       **CLEAR ALL SELECTIONS**      Data   ------------------------- PAST 24 HR DATA REVIEWED -----------------------------------------------    I have personally reviewed the following data over the past 24 hrs:    N/A  \   N/A   / N/A     138 104 33.6 (H) /  128 (H)   4.3 25 1.09 \       Imaging results reviewed over the past 24 hrs:   No results found for this or any previous visit (from the past 24 hour(s)).

## 2023-05-30 NOTE — PROGRESS NOTES
"Care Management Follow Up    Length of Stay (days): 4    Expected Discharge Date: 06/01/2023     Concerns to be Addressed: discharge planning     Patient plan of care discussed at interdisciplinary rounds: Yes    Anticipated Discharge Disposition: Transitional Care     Anticipated Discharge Services: Post acute therapies  Anticipated Discharge DME: None  Patient/family educated on Medicare website which has current facility and service quality ratings: yes  Education Provided on the Discharge Plan: Yes  Patient/Family in Agreement with the Plan: Yes    Referrals Placed by CM/SW: None at this time.  Private pay costs discussed: insurance costs out of pocket expenses    Additional Information:  Per IDT rounds, ID plan pending; TCU still rec.    Upon chart review, FC, Catrachito Dooley, was assisting with MA for LTC meka; however, 05/15/23 account note states, \"FC is not actively working this case as it is pending specifically for asset info between the family and [facility] and the pt may or may not qualify. Nothing further needed from  at this time unless contacted by ZEB.\" [facility] ZEB had been working extensively with pt's nephew, Pollo, and Luke's father/pt's brother, Ivan (while Pollo was out of the country), to get their assistance with helping pt follow [facility] guidelines in order to qualify for MA for LTC services.    ZEB met with pt at bedside to discuss MA status. Pt reported that his girlfriend informed him of a letter received from [facility] [place], stating that his MA was denied. Pt wanted to end conversation, stated, \"there's nothing else to talk about.\" ZEB provided education on Medicare TCU benefit coming close to 100 days, or Medicare benefit has ended. Pt stated that he was told that his 100-day benefit would start over once he has surgery. ZEB provided education on pt needing to be in the community for 60 days, without hospitalization, in order for new 100-day TCU benefit. Pt stated, \"I want to know who's lying to me.\" " SW informed pt that new MA application will need to be submitted, and county instructions will need to be followed (spend down current assets, submit required proof of documents) in order to qualify for MA for LTC services. ZEB reiterated that payment needs to be secured before any TCU will accept pt. Pt reported that his nephew, Pollo Bustos, is his financial POA, and was previously assisting with MA application. Pt gave SW permission to further discuss pt's financial status with nephew, Pollo, provided his contact information (now added to face sheet).    1120: ZEB left  for Pollo (PH: [phone]), requested a call back to discuss mutual pt.          VANESA Minor, LSW  [facility]   PHONE: [phone]  Pager: [phone]

## 2023-05-30 NOTE — PROGRESS NOTES
Brief cardiology note:    Received a page to consult on Mr. Bustos.  He is a 72-year-old man who was admitted to the orthopedic service on 5/26/2023 for a left total hip arthroplasty revision.  He has a history of diabetes, hyperlipidemia, sleep apnea, and prior history of DVT/PE in 2018.  He developed transient chest pain this afternoon.  Heart rates been in the 70s, blood pressure 132/60, satting 97% on room air. EKG from today shows sinus rhythm with a rate of 76, normal axis, first-degree AV block (SD interval 328), low voltage in the limb leads, no ischemic changes.  Compared to EKG from 4/4/2023, no significant changes.  High-sensitivity troponin is 46.  Notably, hemoglobin has been downtrending from 10.4 on admission to 7.9 today.  Creatinine normal.  No known history of CAD (did have Lexiscan in 2013 that showed likely inferior wall tissue attenuation artifact).  There is mild coronary artery calcification in the proximal LAD from CT chest from 4/5/2022.    Recommendations:  -For recurrent chest pain, try sublingual nitroglycerin  -Trend troponin every 6 hours tonight.   -Repeat EKG for any recurrent chest pain  -Please obtain echocardiogram in the morning.  -Formal consult to follow in the morning.  Please do not hesitate to page cardiology at [phone] with questions or if troponin is uptrending or chest pain recurs.      Russ Waller MD  Cardiology Fellow  Pager: [phone]

## 2023-05-30 NOTE — PLAN OF CARE
"Goal Outcome Evaluation:  VS: /60 (BP Location: Left arm)   Pulse 73   Temp 96.8  F (36  C) (Oral)   Resp 16   Ht 1.778 m (5' 10\")   Wt 110.4 kg (243 lb 4.8 oz)   SpO2 98%   BMI 34.91 kg/m       O2: SpO2 > 92%  and stable on RA. LS clear and equal bilaterally. Denies chest pain and SOB.    Output: Voids spontaneously without difficulty to bathroom.    Last BM: LBM 5/29 per pt report. denies abdominal discomfort.   Activity: Not OOB. Pt has been refusing to reposition in bed.   Skin: WDL except, L hip incision.   Pain: Pain managed with prn oxycodone.   CMS: Intact, AOx4. Denies numbness and tingling.   Dressing: Left hip incisional dressing CDI.    Diet: Regular diet. Denies nausea/vomiting.   BG checks before meals 153 and 173 at bedtime.   LDA: PIV SL into right forearm.    Equipment: IV pole, personal belongings,    Plan: Waiting placement to  TCU. Continue with plan of care. Call light within reach, pt able to make needs known.    Additional Info:                              "

## 2023-05-30 NOTE — PROGRESS NOTES
Orthopaedic Surgery Progress Note 05/30/2023    S: Left foot drop still present, overall unchanged since yesterday.  Has been up with therapies, difficult to ambulate.  Otherwise denies new numbness or tingling.  No chest pain or shortness of breath.    O:  Temp: (!) 95.5  F (35.3  C) Temp src: Oral BP: 136/61 Pulse: 75   Resp: 16 SpO2: 95 % O2 Device: None (Room air)      Exam:  Gen: No acute distress, resting comfortably in bed.  Resp: Non-labored breathing    Output by Drain (mL) 05/28/23 0700 - 05/28/23 1459 05/28/23 1500 - 05/28/23 2259 05/28/23 2300 - 05/29/23 0659 05/29/23 0700 - 05/29/23 1459 05/29/23 1500 - 05/29/23 2259 05/29/23 2300 - 05/30/23 0659 05/30/23 0700 - 05/30/23 0744   Closed/Suction Drain 1 Left;Lateral Hip Bulb 15 Kiswahili    30  10         LE:  Dressings c/d/i   Drain in place -10 cc overnight  Abduction pillow in place  Fires GS/FHL, 1+ in TA and EHL  SILT SP/DP/Saph/Sural/T  2+ DP/PT, WWP    Recent Labs   Lab 05/28/23  0538 05/27/23  0714 05/26/23  0753 05/25/23  0644   WBC  --   --  9.5 9.6   HGB 8.8* 9.9* 10.5* 10.4*   PLT  --   --  309 295       Assessment: Waldo Bustos is a 72 year old male s/p L BELLA on 5/26. Doing well, but developing foot drop. Sensation is normal.    Today's plan  - AFO order placed with the orthotics team  - Continue physical therapy      Plan:  50% PWB LLE with posterior hip precautions for 3 months  Continue to monitor foot drop  Keep knee bent in a foam leg elevator to relax the sciatic nerve  F/U OR cultures: NGTD  F/U Drain  Pain control  DVT ppx: Eliquis  PT/OT, OOB  Appreciate medicine recs: Glucose <200  F/U 4 weeks  Dispo planning    Future Appointments   Date Time Provider Department Center   5/27/2023  9:30 AM Ann Martinez, PT [facility]   5/27/2023  1:00 PM Margot Luo, OT [facility]   5/27/2023  3:30 PM UR PT WAITLIST [facility]   6/26/2023 10:20 AM Rom Meyers MD [facility]     Bartolome Verdugo MD

## 2023-05-30 NOTE — PROGRESS NOTES
Patient wanted to hold off on the delivery of the AFO at this time. Patient was going to discuss with the rehab department. I will check back with patient on 5/31.   LYDIA Gaytan.

## 2023-05-30 NOTE — PLAN OF CARE
"  VS: /61 (BP Location: Left arm)   Pulse 75   Temp (!) 95.5  F (35.3  C) (Oral)   Resp 16   Ht 1.778 m (5' 10\")   Wt 110.4 kg (243 lb 4.8 oz)   SpO2 95%   BMI 34.91 kg/m     O2: >90% on RA   Output: Voids spontaneously without difficulty to urinal   Last BM: 5/26/23 per pt report   Activity: AX2 for cares in bed. Pt refused to get OOB overnight or be repositioned.    Up for meals? N/A   Skin: L hip surgical incision. Pt declined full skin assessment.    Pain: L hip pain managed with PRN medications. Pt refused repositioning and ice packs overnight.    CMS: Intact, pt denies numbness or tingling. A&O x4   Dressing: L hip - CDI   Diet: Regular   LDA: PIV R forearm - SL   GUERRERO drain - 10mls of output overnight with small amount of leaking at the site. Dressing reinforced and Ortho resident is aware. Pt refused to be repositioned to have marcelo changed underneath.    Equipment: IV pole/pump, and pt personal belongings.    Plan: Waiting for placement   Additional Info: BG 0200 was 154       "

## 2023-05-30 NOTE — PROGRESS NOTES
[facility]    Medicine Progress Note - Hospitalist Service, GOLD TEAM 16    Date of Admission:  5/26/2023    Assessment & Plan   72 year old male with history of morbid obesity, diabetes, hyperlipidemia, JAIR, PE, DVT, venous insufficiency, osteoarthritis, chronic left hip prosthetic joint infection, recent admission to [facility] on 1120 2/2022 for explantation of left hip prosthesis, debridement, reconstruction with antibiotic spacer, toxic encephalopathy, acute hypoxic respiratory failure due to obesity hypoventilation syndrome, acute blood loss anemia, HALEY, physical deconditioning is being admitted to Ortho service status post explantation of left hip antibiotic spacer, revision left total hip arthroplasty on 5/26/2023.  Medicine has been consulted for extensive medical comorbidities.     #Status post explantation of left hip antibiotic spacer, revision left hip total arthroplasty on 5/26/2023:  Management primarily per Ortho team.  - Wound cares, Dressings, Surgical pain management, DVT Prophylaxis  per primary team.   - Monitor anemia, hemodynamics, Input/Output, Capnography (for 24 hours)  - Encourage Incentive spirometry  - Laxatives for constipation prophylaxis  - Follow-up on culture data.     #Chest pain with modestly elevated troponin  - Chest pain resolved  - Case discussed with cardiology  - Recommend 2D checking echocardiogram in a.m.  - Monitor serial troponin every 6 hours    #Anemia: Most likely due to blood loss from recent surgery.  And anemia of chronic inflammation.  Hemoglobin 10.5 g/dL on 5/26/2023.  Estimated blood loss 500 mL  > Hg 9.9 gm/dl on 05/27/2023  >Hemoglobin 8.8 on 5/28/2023  Plan:  - Transfuse if Hg < 7 gm/dl       #Renal insufficiency: Creatinine level 1.4 on 5/27/2023.  Does not exactly meet the criteria for HALEY.  Creatinine level 1.19 on 5/20/2023, Creatinine level 1.09 on 05/29  Plan:  - Avoid nephrotoxins.  - Encourage PO intake  -  Renal dose medications  - Avoid hypotension, hypovolemia  - Strict I and O      #Diabetes mellitus type 2 (A1c 5.8% 3/6/23)  PTA glipizide discontinued d/t hypoglycemia risk.   >Diabetic medications (insulin aspart medium intensity correction QID, Metformin, Actos and Victoza) were held on the the morning of surgery  >Blood glucose levels reasonably controlled  Plan:  - Continue to hold oral hypoglycemic agent.  - Continue insulin aspart medium intensity correction before meals and bedtime.  - Monitor and titrate as needed.     #Hyperlipidemia  > Prior to admission on atorvastatin 40 mg daily.  > Stable  Plan:  - Continue atorvastatin     #Obstructive sleep apnea  Uses CPAP at night  Plan:  Continue CPAP  - Monitor on pulse oximetry and capnography per postop protocol     #Unprovoked DVT in L common femoral, deep and superficial femoral, popliteal, posterior tibial veins and PE (2018) on chronic anticoagulation  > Prior to admission apixaban.  > Apixaban has been held in preparation for surgery.  > Apixaban has been started  Plan:  - Continue apixaban      #Depression vs. adjustment disorder with depressed mood  Depressed mood in setting of ongoing health issues. Passive suicidal statements to nursing staff in the past. Not interested in medication therapy or psychiatry consultation.   - Health psychology was not available to follow in the rehab.  Consider reinitiating post discharge when Health Psychology is able to connect with him.     #Right elbow, shoulder numbness  Likely positional.  Strength 5 x 5.  Sensation intact.  Plan:  - Continue to monitor.       Diet: Advance Diet as Tolerated: Regular Diet Adult    DVT Prophylaxis: DOAC  Tran Catheter: Not present  Lines: None     Cardiac Monitoring: None  Code Status: Full Code      Clinically Significant Risk Factors                         # Obesity: Estimated body mass index is 34.91 kg/m  as calculated from the following:    Height as of this encounter: 1.778  "m (5' 10\").    Weight as of this encounter: 110.4 kg (243 lb 4.8 oz).           Disposition Plan      Expected Discharge Date: 06/01/2023,  3:00 PM    Destination: nursing home            Jem Smith DO, S  Hospitalist Service, GOLD TEAM 16  [facility]  Securely message with [employer] (more info)  Text page via [facility] Paging/Directory   See signed in provider for up to date coverage information  ______________________________________________________________________    Interval History   Patient sleeping comfortably upon entering room.  Patient endorsed chest pain to nursing staff.  EKG essentially within normal limits; troponin modestly elevated troponin.  Discussed case with cardiology; please see above.    Physical Exam   Vital Signs: Temp: 97.3  F (36.3  C) Temp src: Oral BP: 132/60 Pulse: 77   Resp: 16 SpO2: 97 % O2 Device: None (Room air)    Weight: 243 lbs 4.79 oz    GENERAL: Alert and oriented x 3; no acute distress; well-nourished.  HEENT: Normocephalic; atraumatic; PERRLA; MMM.  CV: RRR; normal S1, S2; no rubs, murmurs, or gallops.  RESP: Lung fields clear to aucultation B/L; no wheezing or crepitations.  GI: Abdomen is soft, nontender, nondistended; no organomegaly; normal bowel sounds.  : Deferred genital examination.   MSK: No clubbing, cyanosis, or edema.  DERM: Skin is intact; no rash, lesions, or skin breakdown.  NEURO: No focal deficits appreciated; strength & sensorium are grossly intact.  PSYCH: No active hallucinations; affect, insight appear within normal limits.    Medical Decision Making       55 MINUTES SPENT BY ME on the date of service doing chart review, history, exam, documentation & further activities per the note.      Data     I have personally reviewed the following data over the past 24 hrs:    6.3  \   7.9 (L)   / N/A     138 104 28.5 (H) /  165 (H)   4.0 25 0.94 \       Trop: 46 (H) BNP: N/A       Imaging results reviewed over the past " 24 hrs:   No results found for this or any previous visit (from the past 24 hour(s)).

## 2023-05-31 NOTE — PROGRESS NOTES
I spoke with patient regarding the AFO order.  Patient has declined the AFO at this time.   LYDIA Gaytan.

## 2023-05-31 NOTE — CONSULTS
Consult Date: 2023    REQUESTED BY:  Dr. Jem Smith    The patient is a 72-year-old, , white male with no previous psychiatric history but with a long history of left hip problems since at least , with 3 separate hip replacements and 1 revision, who had spent about 4 months in a rehab facility and was readmitted here for revision of the left hip arthroplasty, which took place on 2023.  He was understandably depressed because of the long hospital and TCU care, and the psychiatric consultation was ordered to assess his current status and lack of motivation to work with a therapist.    HISTORY OF PRESENT ILLNESS:  I had reviewed the patient's chart and interviewed the patient in his room.  He engaged in interview without any difficulties and provided coherent and seemingly reliable history.  He reiterated that he spent 4 months in transitional care unit but could not figure out what he was transitioning from and to what. He was somewhat dissatisfied with the care he had received in TCU.  He adamantly denied being uncooperative or refusing therapies and, instead, was quite upset about inconsistencies of therapeutic approaches, especially physical therapy, while being there; however, he did not show any signs of autonomous depression.  His night sleep and appetite have been adequate.  He reported no diurnal mood swings and no suicidal ideation.    The patient told me that if one can talk about depression, it is what he had after his son  of a drug overdose in .  He did not grieve his death properly and continues to grieve his death to date. This was also aggravated by his niece dying of COVID about 1 year ago.    The patient has never been seeing a therapist on a regular basis to deal with his bereavement issues and never had taken any psychotropic medications.    CHEMICAL USE HISTORY:  The patient adamantly denied any history of alcohol abuse, illicit drug abuse or prescription medication  abuse.    FAMILY HISTORY:  The patient does not know anything about his biological parents, as he was adopted at a young age. His son had some persisting chemical dependency problems and  of the drug overdose in . The patient believes it was accidental.    PAST MEDICAL HISTORY:  Remarkable for above-mentioned left hip problems with 4 consecutive surgeries, diabetes mellitus type 2, hyperlipidemia, obstructive sleep apnea and anemia, likely due to recent blood loss from surgery.    ALLERGIES:  THE PATIENT IS ALLERGIC TO SULFA DRUGS AND TIZANIDINE, WHICH CAUSED FREQUENT URINATION, DROWSINESS AND DRY MOUTH.    BRIEF SOCIAL HISTORY:  The patient was born and raised in [place].  He was adopted at a young age, and his childhood was uneventful without any evidence of abuse of any kind.  He had graduated from high school and had a year and a half of college but never graduated.  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  He got  in , and the marriage had produced 1 son who passed away in .  The marriage had ended in divorce in .  He has been with his current girlfriend for about 30 years, and they ran a foster care together for 13 years.  He has been living alone in a 4-level house in [place], [place].  Because of his hip problems, it was difficult for him to live there, and he would rather have another place, which would be a [facility] type.    MENTAL STATUS EXAMINATION:  Revealed a normally-built and normally-developed, morbidly obese, very pleasant, friendly and cooperative with the interview, 72-year-old, white male, appearing about his stated age.  He was alert and oriented x3.  His speech was coherent, logical and goal-directed.  He showed no objective signs of autonomous depression but has been somewhat upset and feeling down at times because of his long hospitalizations and TCU stays.  He adamantly denied suicidal ideation  or intent currently or ever in the past.  He denied hallucinations, and no prominent delusions could be noted or elicited.  The patient had some insight into his problems, and his judgment does not seem to be significantly impaired.    DIAGNOSTIC IMPRESSION:    1.  Adjustment disorder with depressed mood.  2.  Prolonged grief disorder.  3.  Status post revision of the left hip arthroplasty.    PLAN/DISCUSSION:  The patient is not interested in any mood-altering medications and politely declined an antidepressant trial.  He is more willing to engage with individual psychotherapy, which should take place while he is in the hospital and after he is discharged to TCU.    I thank you very much for letting me participate in the care of this patient.    Raymond Salinas MD        D: 2023   T: 2023   MT: shaq    Name:     SANDRA BRANCH  MRN:      [id number]        Account:      [id number]   :      1950           Consult Date: 2023     Document: [id number]    cc:  Jem Smith DO

## 2023-05-31 NOTE — CONSULTS
Cardiology Inpatient Consultation  May 31, 2023    Reason for Consult:  A cardiology consult was requested by Jem Smith DO from the Medicine service to provide clinical guidance regarding chest pain.    Assessment:    #Chest pain  #Acute myocardial injury  #Acute blood loss anemia  #HLD  #T2DM  #JAIR  #Hx of DVT/PE in 2018    Mr Bustos is a 73yo M w a hx as below who was adm on 5/26 for a L-BELLA who on 5/31 developed transient chest pain that self-resolved with no medical interventions, trops were found to be elevated (46-51-51) with no significant delta. His HEART score is 4 (HLD, T2DM, age, trops) putting him at a high risk (12-65%) for MACE in the next 30d warranting further workup. It would be appropriate to get a CTA to better define coronary anatomy.    Unclear is the pain is cardiac in origin; in the postOp period the risk for PE is also elevated (less likely given lack of tachycardia, but cannot completely rule out).     Recommendations:    - Obtain TTE to assess for new FWMA  - Consider CTPE to rule out/in PE (less likely given ppx w Apixaban)  - Coronary CTA to define coronary anatomy, timing will be defined by TTE results (if no new FWMA, could be done in the outpatient setting).     Plan of care discussed with Dr. Samuels, who agrees with above plan.    Thank you for consulting the cardiovascular services at the [facility]. Please do not hesitate to call us with any questions.     Franco Solorzano MD  Internal Medicine PGY2    HPI:   Waldo Bustos is a 72 year old male w a PMHx of HLD, T2DM, JAIR and DVT/PE in 2018 who was adm on 5/26/23 for a L hip total arthroplasty. On 5/30 in the afternoon, he developed transient chest pain that self-resolved with no medical intervention. An EKG was obtained and it did not have any changes suggestive of ongoing ischemia, trops were elevated 46-51-51 with no significant delta.     A TTE in 4/22 showed LVEF of 65-70% with no FWMA. An  MPI in 2013 showed a small sized fixed perfusion abnormality in the inferior wall consistent with infarction.       PMH:  Past Medical History:   Diagnosis Date     Arthritis 5 years ago     Chronic osteoarthritis Not sure     Diabetes (H) 10-12 years ago     DVT (deep venous thrombosis) (H)      Dyslipidemia      Gastroesophageal reflux disease      History of blood transfusion      Iliopsoas abscess (H)      Infection of prosthetic hip joint (H)      JAIR (obstructive sleep apnea)      Pulmonary embolism (H)      RA (rheumatoid arthritis) (H) Not sure     Reduced vision 2-3 years ago    Cataract Surgery on both eyes     Venous insufficiency      Active Problems:  Patient Active Problem List    Diagnosis Date Noted     Infection of prosthetic joint, sequela 05/26/2023     Priority: Medium     Prosthetic joint infection (H) 12/24/2022     Priority: Medium     Prosthetic joint infection, initial encounter (H) 11/22/2022     Priority: Medium     Dyslipidemia 11/15/2022     Priority: Medium     Iliopsoas abscess (H) 04/13/2022     Priority: Medium     HALEY (acute kidney injury) (H) 04/02/2022     Priority: Medium     Symptomatic anemia 04/02/2022     Priority: Medium     Leukocytosis 04/02/2022     Priority: Medium     Diabetic retinopathy of both eyes associated with diabetes mellitus due to underlying condition (H) 04/24/2020     Priority: Medium     Chronic anticoagulation 01/23/2019     Priority: Medium     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/17/2019     Priority: Medium     History of pulmonary embolism 01/15/2019     Priority: Medium     Iliopsoas bursitis of left hip 08/17/2018     Priority: Medium     Aftercare following surgery of the musculoskeletal system 03/22/2018     Priority: Medium     Acute blood loss anemia 03/08/2018     Priority: Medium     Gastroesophageal reflux disease without esophagitis 03/07/2018     Priority: Medium     Morbid obesity (H) 03/07/2018     Priority: Medium     Venous  insufficiency 04/23/2013     Priority: Medium     Overview:   -  Bilateral deep and GSV insufficiency right > left       Achilles bursitis or tendinitis 11/20/2012     Priority: Medium     Calcification of tendon 11/20/2012     Priority: Medium     Heel spur 11/20/2012     Priority: Medium     JAIR (obstructive sleep apnea) 12/21/2009     Priority: Medium     Hyperlipidemia 10/29/2007     Priority: Medium     Inguinal hernia 11/10/2005     Priority: Medium     Uncontrolled diabetes mellitus 11/10/2005     Priority: Medium     Umbilical hernia 11/10/2005     Priority: Medium     Social History:  Social History     Tobacco Use     Smoking status: Never     Smokeless tobacco: Never   Substance Use Topics     Alcohol use: No     Drug use: No     Family History:  Family History   Adopted: Yes   Problem Relation Age of Onset     Diabetes No family hx of      Rheumatoid Arthritis No family hx of      Low Back Problems No family hx of      Unknown/Adopted No family hx of        Medications:    apixaban ANTICOAGULANT  5 mg Oral BID     atorvastatin  40 mg Oral Daily     insulin aspart  1-7 Units Subcutaneous TID AC     insulin aspart  1-5 Units Subcutaneous At Bedtime     lactobacillus rhamnosus (GG)  1 capsule Oral BID     polyethylene glycol  17 g Oral Daily     ropivacaine 300 mg, ketorolac 30 mg, EPHINEPHrine 0.6 mg in sodium chloride 0.9%   INTRA-ARTICULAR On Call to OR     senna-docusate  1 tablet Oral BID     sodium chloride (PF)  3 mL Intracatheter Q8H           Physical Exam:  Temp:  [95.6  F (35.3  C)-97.3  F (36.3  C)] 95.6  F (35.3  C)  Pulse:  [69-83] 77  Resp:  [14-16] 16  BP: (117-140)/(52-60) 133/54  SpO2:  [96 %-99 %] 96 %    Diagnostics:  All labs and imaging were reviewed, of note:    CMP  Recent Labs   Lab 05/31/23  0630 05/31/23  0210 05/30/23  2136 05/30/23  1727 05/30/23  0929 05/30/23  0643 05/29/23  0812 05/29/23  0512 05/28/23  0822 05/28/23  0538 05/26/23  1142 05/26/23  0753     --   --   --    --  138  --  138  --  135*   < > 139   POTASSIUM 4.0  --   --   --   --  4.0  --  4.3  --  4.5   < > 4.4   CHLORIDE 104  --   --   --   --  104  --  104  --  101   < > 105   CO2 25  --   --   --   --  25  --  25  --  25   < > 24   ANIONGAP 10  --   --   --   --  9  --  9  --  9   < > 10   * 128* 231* 165*   < > 135*   < > 147*   < > 121*   < > 90   BUN 26.0*  --   --   --   --  28.5*  --  33.6*  --  41.0*   < > 33.4*   CR 0.90  --   --   --   --  0.94  --  1.09  --  1.19*   < > 1.13   GFRESTIMATED >90  --   --   --   --  87  --  73  --  65   < > 69   MAIKOL 8.2*  --   --   --   --  8.0*  --  7.8*  --  7.9*   < > 8.9   PROTTOTAL 5.8*  --   --   --   --   --   --   --   --   --   --  6.7   ALBUMIN 3.2*  --   --   --   --   --   --   --   --   --   --  3.7   BILITOTAL 0.2  --   --   --   --   --   --   --   --   --   --  0.3   ALKPHOS 74  --   --   --   --   --   --   --   --   --   --  92   AST 14  --   --   --   --   --   --   --   --   --   --  10   ALT 6*  --   --   --   --   --   --   --   --   --   --  6*    < > = values in this interval not displayed.     CBC  Recent Labs   Lab 05/31/23  0630 05/30/23  1532 05/28/23  0538 05/27/23  0714 05/26/23  0753 05/25/23  0644   WBC 8.4 6.3  --   --  9.5 9.6   RBC 2.51* 2.54*  --   --  3.46* 3.43*   HGB 7.6* 7.9* 8.8* 9.9* 10.5* 10.4*   HCT 24.5* 23.9*  --   --  32.9* 32.5*   MCV 98 94  --   --  95 95   MCH 30.3 31.1  --   --  30.3 30.3   MCHC 31.0* 33.1  --   --  31.9 32.0   RDW 14.0 14.1  --   --  13.8 14.0     --   --   --  309 295     INRNo lab results found in last 7 days.  Arterial Blood GasNo lab results found in last 7 days.    No results found for: TROPI, TROPONIN, TROPR, TROPN    EKG: NSR, 1 AVB, low voltages on limb leads    Transthoracic echocardiogram: pending    Catheterization(s): None prior

## 2023-05-31 NOTE — PROGRESS NOTES
[facility] [place]    Medicine Progress Note - Hospitalist Service, GOLD TEAM 16    Date of Admission:  5/26/2023    Assessment & Plan   72 year old male with history of morbid obesity, diabetes, hyperlipidemia, JAIR, PE, DVT, venous insufficiency, osteoarthritis, chronic left hip prosthetic joint infection, recent admission to [facility] on 1120 2/2022 for explantation of left hip prosthesis, debridement, reconstruction with antibiotic spacer, toxic encephalopathy, acute hypoxic respiratory failure due to obesity hypoventilation syndrome, acute blood loss anemia, HALEY, physical deconditioning is being admitted to Ortho service status post explantation of left hip antibiotic spacer, revision left total hip arthroplasty on 5/26/2023.  Medicine has been consulted for extensive medical comorbidities.     #Status post explantation of left hip antibiotic spacer, revision left hip total arthroplasty on 5/26/2023:  Management primarily per Ortho team.  - Wound cares, Dressings, Surgical pain management, DVT Prophylaxis  per primary team.   - Monitor anemia, hemodynamics, Input/Output, Capnography (for 24 hours)  - Encourage Incentive spirometry  - Laxatives for constipation prophylaxis  - Follow-up on culture data.     #Chest pain with modestly elevated troponin  - Chest pain resolved  - Case discussed with cardiology  - Recommend 2D checking echocardiogram in a.m.  - Monitor serial troponin every 6 hours    #Anemia: Most likely due to blood loss from recent surgery.  And anemia of chronic inflammation.  Hemoglobin 10.5 g/dL on 5/26/2023.  Estimated blood loss 500 mL  > Hg 9.9 gm/dl on 05/27/2023  >Hemoglobin 8.8 on 5/28/2023  Plan:  - Transfuse if Hg < 7 gm/dl       #Renal insufficiency: Creatinine level 1.4 on 5/27/2023.  Does not exactly meet the criteria for HALEY.  Creatinine level 1.19 on 5/20/2023, Creatinine level 1.09 on 05/29  Plan:  - Avoid nephrotoxins.  - Encourage PO intake  -  Renal dose medications  - Avoid hypotension, hypovolemia  - Strict I and O      #Diabetes mellitus type 2 (A1c 5.8% 3/6/23)  PTA glipizide discontinued d/t hypoglycemia risk.   >Diabetic medications (insulin aspart medium intensity correction QID, Metformin, Actos and Victoza) were held on the the morning of surgery  >Blood glucose levels reasonably controlled  Plan:  - Continue to hold oral hypoglycemic agent.  - Continue insulin aspart medium intensity correction before meals and bedtime.  - Monitor and titrate as needed.     #Hyperlipidemia  > Prior to admission on atorvastatin 40 mg daily.  > Stable  Plan:  - Continue atorvastatin     #Obstructive sleep apnea  Uses CPAP at night  Plan:  Continue CPAP  - Monitor on pulse oximetry and capnography per postop protocol     #Unprovoked DVT in L common femoral, deep and superficial femoral, popliteal, posterior tibial veins and PE (2018) on chronic anticoagulation  > Prior to admission apixaban.  > Apixaban has been held in preparation for surgery.  > Apixaban has been started  Plan:  - Continue apixaban      #Depression vs. adjustment disorder with depressed mood  Depressed mood in setting of ongoing health issues. Passive suicidal statements to nursing staff in the past. Not interested in medication therapy or psychiatry consultation.   - Health psychology was not available to follow in the rehab.  Consider reinitiating post discharge when Health Psychology is able to connect with him.     #Right elbow, shoulder numbness  Likely positional.  Strength 5 x 5.  Sensation intact.  Plan:  - Continue to monitor.    #Chest pain  - Serial troponin measurements mildly elevated  - Chest pain reportedly resolved  - Discussed case with cardiology at length  - Very much appreciate cardiology's thoughtful consultation  - Check 2D echocardiogram today  - Consider CTA chest to rule out pulmonary embolism (less likely)       Diet: Advance Diet as Tolerated: Regular Diet Adult    DVT  "Prophylaxis: DOAC  Tran Catheter: Not present  Lines: None     Cardiac Monitoring: None  Code Status: Full Code      Clinically Significant Risk Factors          # Hypocalcemia: Lowest Ca = 8 mg/dL in last 2 days, will monitor and replace as appropriate     # Hypoalbuminemia: Lowest albumin = 3.2 g/dL at 5/31/2023  6:30 AM, will monitor as appropriate            # Obesity: Estimated body mass index is 34.91 kg/m  as calculated from the following:    Height as of this encounter: 1.778 m (5' 10\").    Weight as of this encounter: 110.4 kg (243 lb 4.8 oz).           Disposition Plan     Expected Discharge Date: 06/01/2023,  3:00 PM    Destination: nursing home            Jem Smith DO, S  Hospitalist Service, GOLD TEAM [address] [place] [address]  Securely message with [email or web address] (more info)  Text page via [facility] Paging/Directory   See signed in provider for up to date coverage information  ______________________________________________________________________    Interval History   Patient reports feeling crappy today.  Patient reports chest pain is resolved.  Patient asked me to not touch his legs, as they are tender to touch.  Patient is pending 2D echocardiogram per cardiology recommendation.    Physical Exam   Vital Signs: Temp: (!) 95.6  F (35.3  C) Temp src: Oral BP: 133/54 Pulse: 77   Resp: 16 SpO2: 96 % O2 Device: None (Room air)    Weight: 243 lbs 4.79 oz    GENERAL: Alert and oriented x 3; no acute distress; well-nourished.  HEENT: Normocephalic; atraumatic; PERRLA; MMM.  CV: RRR; normal S1, S2; no rubs, murmurs, or gallops.  RESP: Lung fields clear to aucultation B/L; no wheezing or crepitations.  GI: Abdomen is soft, nontender, nondistended; no organomegaly; normal bowel sounds.  : Deferred genital examination.   MSK: No clubbing, cyanosis, or edema.  DERM: Skin is intact; no rash, lesions, or skin breakdown.  NEURO: No focal deficits appreciated; strength & sensorium are " grossly intact.  PSYCH: No active hallucinations; affect, insight appear within normal limits.    Medical Decision Making       55 MINUTES SPENT BY ME on the date of service doing chart review, history, exam, documentation & further activities per the note.      Data     I have personally reviewed the following data over the past 24 hrs:    8.4  \   7.6 (L)   / 297     139 104 26.0 (H) /  137 (H)   4.0 25 0.90 \       ALT: 6 (L) AST: 14 AP: 74 TBILI: 0.2   ALB: 3.2 (L) TOT PROTEIN: 5.8 (L) LIPASE: N/A       Trop: 51 (H) BNP: N/A       Imaging results reviewed over the past 24 hrs:   No results found for this or any previous visit (from the past 24 hour(s)).

## 2023-05-31 NOTE — CONSULTS
PSYCHIATRIC CONSULTATION    Requesting Physician: Jem Smith DO    Admission Date: 05/26/2023  Date of Service: 05/31/2023    The patient was seen, his chart reviewed, report to follow    Dx: Adjustment Disorder with depressed mood        Prolong Grief Disorder        S/P Revision of the Left Hip Arthtroplasty    Plan/Discussion: The patient is not interested in any mood altering medications and politely declined an antidepressant trial. He is more willing to engage with Health Psychology while hospitalized and and after his transfer to TCU.    Thanks,     Raymond Salinas MD

## 2023-05-31 NOTE — PLAN OF CARE
"  VS: /54 (BP Location: Left arm)   Pulse 77   Temp (!) 95.6  F (35.3  C) (Oral)   Resp 16   Ht 1.778 m (5' 10\")   Wt 110.4 kg (243 lb 4.8 oz)   SpO2 96%   BMI 34.91 kg/m       O2: Stable on room air   Output: Voids without difficulty in bedside urinal    Last BM: 5/26- per flowsheet. Pt stated \"I do not remember\".   Passing flatus, active bowel sounds. No abdominal discomfort.    Activity: Ax2 with lift device. Ax1-2 with bed cares.   Scored 15 on neetu risk scale- pt refused pulsate mattress to be set up and refuses to be repositioned.    Skin: Bruises to BUE. L hip surgical incision. Visible skin intact- refused full skin assessment.    Pain: C/o LLE pain/tenderness managed with PRN medications and rest.    CMS: Bilateral calf tenderness   Dressing: L hip surgical incision- scant dried drainage.   Dried drainage around GUERRERO drain.    Diet: Regular diet, thin liquids, takes pills whole   LDA: R PIV saline locked  GUERRERO drain- L hip   Equipment: IV pole, personal belongings.    Plan: SW following   Additional Info: A&Ox4; flat affect, easily irritable but cooperative. Denies headache, SOB. Denies chest pain- echo completed. Refused to be fitted for AFO- using pillows to elevate leg. Makes needs known and uses call light appropriately.        "

## 2023-05-31 NOTE — PROGRESS NOTES
"Care Management Follow Up    Length of Stay (days): 5    Expected Discharge Date: 06/01/23  Concerns to be Addressed: discharge planning     Patient plan of care discussed at interdisciplinary rounds: Yes    Anticipated Discharge Disposition: Return to TCU    [facility]  [address]  4th [place] [address]  Admissions: [phone]  RN Station: [phone]     Anticipated Discharge Services:  (Post Acute Therapies)  Anticipated Discharge DME: None    Patient/family educated on Medicare website which has current facility and service quality ratings: yes  Education Provided on the Discharge Plan: Yes  Patient/Family in Agreement with the Plan:  (TBD)    Referrals Placed by CM/SW:  (None at this time.)  Private pay costs discussed: MA for LTC eligibility and necessity to secure payment for TCU previously discussed    Additional Information:  1045: ZEB reached out to  Liaison, Linda Walter, who confirmed that Leadership has agreed to take pt back to  TCU when medically ready.    1115:  ZEB called pt's nephew, Pollo, who confirmed he was pt's Financial POA. Pollo also reported that letter was received from [facility] stating that pt was denied MA. Pollo expressed his frustration with working with pt to sell the 4 vehicles that the [facility] was requiring, as they were \"counting against his assets.\" Pollo reported that pt would agree to selling, then adamantly change his mind the next week. 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 Pollo also reported that [facility] only paid for ~20 TCU days, so thought that there are still Medicare benefit days and [facility] would be able authorize TCU stay post hip replacement. ZEB informed Pollo that FC would be able to assist the MA application again. Pollo stated that SW could initiate FC referral now to start " "MA for LTC application process again.    1145: SW made referral to , Catrachito Dooley;  TCU ZEB CC'd in email as well.    1150: SW met with pt at bedside, provided update on discharge plan and  referral for another MA for LTC services application. SW reiterated to pt the importance of following [facility] guidelines re: selling of his 4 vehicles. Pt expressed understanding and agreeability of discharge plan, along with education provided on MA for LTC services eligibility and process.    1155: Linda Walter informed SW that TCU SW \"was suspecting his lack of progress and motivation for rehab may be due to depression\"; requested that MD be updated and suggested Psych consult during hospitalization for recs.    1215: SW paged Dr. Smith, provided update of pt's discharge plan, along with  TCU's suggestion of Psych consult for recs r/t potential depression.    1500: ZEB received email correspondence from , Catrachito Dooley:  \"Since patient doesn t currently qualify (due to exceeding asset requirements as you mentioned), the recommendation would be for the family to work with an Elder Law  to figure out options for getting the assets within the guidelines to qualify. The patient cannot qualify for MA LTC until these changes have been made, and Financial Counseling cannot advise on this matter.\"    TCU ZEB stated via email that Pollo was previously given list of Elder Law Attorneys.    1545: SW called Pollo, provided the above  information from . Pollo expressed understanding and reported that he still has list of Elder Law Attorneys; stated that he will assist with next steps for pt's MA eligibility.          Elvia Zaragoza, ANTONETTEW, LSW  [facility]   PHONE: [phone]  Pager: [phone]      "

## 2023-05-31 NOTE — PROVIDER NOTIFICATION
Gerardo Smith at 0955 AM:   Pt c/o bilateral calf pain/tenderness. No redness, warm but not hot. Pedal pulses found with doppler. Will continue to monitor.

## 2023-05-31 NOTE — PLAN OF CARE
"  VS: BP (!) 140/53 (BP Location: Right arm)   Pulse 83   Temp (!) 96.4  F (35.8  C) (Oral)   Resp 16   Ht 1.778 m (5' 10\")   Wt 110.4 kg (243 lb 4.8 oz)   SpO2 99%   BMI 34.91 kg/m    Pt denies chest pain.   O2: >90% on RA. Lung sounds clear and equal bilaterally.    Output: No void overnight, bladder scanned at 0654 for 314. Oral intake encouraged.    Last BM: 5/26/23 bowel sounds hypoactive. Pt reports passing flatus.    Activity: Ax2 with lift device when pt is OOB. PWB to LLE. Pt not OOB overnight. Pt refused repositioning.    Up for meals? N/A   Skin: L hip incision, scattered bruising to BUE. Pt declined full skin assessment.    Pain: Reports pain to L hip, ankle and foot. Pain also reported in neck. Managed with PRN medications.    CMS: Numbness reported to L foot and big toe. A&O x4   Dressing: L hip dressing - CDI  GUERRERO dressing with small amount of drainage - dressing reinforced    Diet: Regular   LDA: PIV R - SL   Equipment: Pt belongings   Plan: TBD   Additional Info: BG 0200 was 128  Echo scheduled for 5/31/23 no time set yet.        "

## 2023-05-31 NOTE — PROGRESS NOTES
Orthopaedic Surgery Progress Note 05/31/2023    S: No orthopedic changes overnight.  Pain still present, overall adequately controlled.  Drain with decreasing output, still holding suction.  Tolerating a regular diet.  Evaluated by the orthotist yesterday, desiring to hold on fitting of AFO.    O:  Temp: (!) 96.4  F (35.8  C) Temp src: Oral BP: (!) 140/53 Pulse: 83   Resp: 16 SpO2: 99 % O2 Device: None (Room air)      Exam:  Gen: No acute distress, resting comfortably in bed.  Resp: Non-labored breathing    Output by Drain (mL) 05/29/23 0700 - 05/29/23 1459 05/29/23 1500 - 05/29/23 2259 05/29/23 2300 - 05/30/23 0659 05/30/23 0700 - 05/30/23 1459 05/30/23 1500 - 05/30/23 2259 05/30/23 2300 - 05/31/23 0641   Closed/Suction Drain 1 Left;Lateral Hip Bulb 15 [place] 30  10 30 45 10        LE:  Dressings c/d/i   Drain in place -10 cc overnight  Abduction pillow in place  Fires GS/FHL, 1+ in TA and EHL  SILT SP/DP/Saph/Sural/T  2+ DP/PT, WWP    Recent Labs   Lab 05/30/23  1532 05/28/23  0538 05/27/23  0714 05/26/23  0753 05/25/23  0644   WBC 6.3  --   --  9.5 9.6   HGB 7.9* 8.8* 9.9* 10.5* 10.4*   PLT  --   --   --  309 295       Assessment: Waldo Bustos is a 72 year old male s/p L BELLA on 5/26. Doing well, but developing foot drop. Sensation is normal.    Today's plan  - Continue physical therapy  - As soon as willing, fit with AFO      Plan:  50% PWB LLE with posterior hip precautions for 3 months  Continue to monitor foot drop, AFO ordered  Keep knee bent in a foam leg elevator to relax the sciatic nerve  F/U OR cultures: NGTD  F/U Drain  Pain control  DVT ppx: Eliquis  PT/OT, OOB  Appreciate medicine recs: Glucose <200  F/U 4 weeks  Dispo planning    Future Appointments   Date Time Provider Department Center   5/27/2023  9:30 AM Ann Martinez, PT URPT [place]   5/27/2023  1:00 PM Margot Luo, OT UROT [place]   5/27/2023  3:30 PM UR PT WAITLIST URPT [place]   6/26/2023 10:20 AM Rom Meyers MD [facility]  [facility]     Bartolome Verdugo MD

## 2023-05-31 NOTE — PROGRESS NOTES
"VS: BP (!) 140/53 (BP Location: Right arm)   Pulse 83   Temp (!) 96.4  F (35.8  C) (Oral)   Resp 16   Ht 1.778 m (5' 10\")   Wt 110.4 kg (243 lb 4.8 oz)   SpO2 99%   BMI 34.91 kg/m       O2: RA   Output: Voids spontaneously without difficulty, uses urinal.   Last BM: 5/26/23 per pt report, said \"four or five days ago\".   Activity: AX2 for cares in bed.     Up for meals? Yes   Skin: L hip surgical incision. Skin looks good.   Pain: L hip pain managed with PRN medications.     CMS: Intact, pt denies numbness or tingling. A&O x4   Dressing: L hip - CDI   Diet: Regular, 100%   LDA: PIV R forearm - SL   GUERRERO drain - Small amount of serosanguinous drains.    Equipment: IV pole/pump, and pt personal belongings.    Plan: TBD   Additional Info: BG at 1727 was 165mg/dL, and at 2136 it was 231mg/dL.       "

## 2023-06-01 NOTE — PROGRESS NOTES
Orthopaedic Surgery Progress Note 06/01/2023    S: No orthopedic changes.  Afebrile, hemodynamically stable.  Foot drop present, patient refusing to be fitted for an AFO.  Patient was seen and evaluated by the psych team yesterday, please see their note.  Patient declining pharmacotherapy for adjustment disorder, prolonged grief disorder.  Awaiting discharge to TCU.    O:  Temp: 97.4  F (36.3  C) Temp src: Oral BP: (!) 144/61 Pulse: 80   Resp: 16 SpO2: 94 % O2 Device: None (Room air)      Exam:  Gen: No acute distress, resting comfortably in bed.  Resp: Non-labored breathing    Output by Drain (mL) 05/30/23 0700 - 05/30/23 1459 05/30/23 1500 - 05/30/23 2259 05/30/23 2300 - 05/31/23 0659 05/31/23 0700 - 05/31/23 1459 05/31/23 1500 - 05/31/23 2259 05/31/23 2300 - 06/01/23 0639   Closed/Suction Drain 1 Left;Lateral Hip Bulb 15 [place] 30 45 10  50         LE:  Dressings c/d/i   Drain in place -10 cc overnight  Abduction pillow in place  Fires GS/FHL, 1+ in TA and EHL  SILT SP/DP/Saph/Sural/T  2+ DP/PT, WWP    Recent Labs   Lab 05/31/23  0630 05/30/23  1532 05/28/23  0538 05/27/23  0714 05/26/23  0753 05/25/23  0644   WBC 8.4 6.3  --   --  9.5 9.6   HGB 7.6* 7.9* 8.8*   < > 10.5* 10.4*     --   --   --  309 295    < > = values in this interval not displayed.       Assessment: Waldo Bustos is a 72 year old male s/p L BELLA on 5/26. Doing well, but developing foot drop. Sensation is normal.    Today's plan  - Continue physical therapy  - Patient has declined AFO  - Patient has declined pharmacotherapy for adjustment and grief disorder  - Continue plan for discharge to TCU      Plan:  50% PWB LLE with posterior hip precautions for 3 months  Continue to monitor foot drop, AFO ordered  Keep knee bent in a foam leg elevator to relax the sciatic nerve  F/U OR cultures: NGTD  F/U Drain  Pain control  DVT ppx: Eliquis  PT/OT, OOB  Appreciate medicine recs: Glucose <200  F/U 4 weeks  Dispo planning    Future Appointments    Date Time Provider Department Center   5/27/2023  9:30 AM Ann Martinez, PT URPT [place]   5/27/2023  1:00 PM Margot Luo, OT [facility]OT [place]   5/27/2023  3:30 PM UR PT WAITLIST URPT [place]   6/26/2023 10:20 AM [facility]     Bartolome Verdugo MD

## 2023-06-01 NOTE — PROGRESS NOTES
"  VS: BP (!) 140/43 (BP Location: Left arm, Patient Position: Supine, Cuff Size: Adult Large)   Pulse 79   Temp 97.8  F (36.6  C) (Oral)   Resp 16   Ht 1.778 m (5' 10\")   Wt 110.4 kg (243 lb 4.8 oz)   SpO2 95%   BMI 34.91 kg/m     O2: Stable on RA   Output: Spontaneously voids to urinal, bed pan for bowel movements   Last BM: 5/26/23, administered stool softners   Activity: A2 w/Liko lift   Skin: L hip surgical incision   Pain: Sharp pain on L foot when touched, he requested I don't touch it Refusing to fitting for AFO.     CMS: A/O x4, denied N/T   Dressing: L hip dressing CDI   Diet: Regular/thin   LDA: GUERRERO drain with small amt of drainage, PIV R forearm saline locked   Equipment: IV pole/pump, personal belongings   Plan: TBD, possible discharge to TCU   Additional Info: Pt adamantly refusing IV insertion for CT angiogram due to difficulties inserting it. Provided education on the reasoning for both the IV and angiogram but he still refused. The apt was cancelled.            "

## 2023-06-01 NOTE — CARE PLAN
Attempted IV access X2 and was unsuccessful. Two separate RN's attempted 18 gauge in AC for echo. Patient refused Ultrasound IV access.

## 2023-06-01 NOTE — PROGRESS NOTES
"VS: BP (!) 144/61 (BP Location: Left arm)   Pulse 80   Temp 97.4  F (36.3  C) (Oral)   Resp 16   Ht 1.778 m (5' 10\")   Wt 110.4 kg (243 lb 4.8 oz)   SpO2 94%   BMI 34.91 kg/m       O2: RA   Output: Voids spontaneously without difficulty, uses urinal.   Last BM: 5/26/23 per pt report..   Activity: AX2 for cares in bed.     Up for meals? No. Patient declined to get out of bed.   Skin: L hip surgical incision. Visible skin intact and patient refused full skin assessment.   Pain: L hip pain managed with PRN medications.     CMS: Intact, pt denies numbness or tingling. A&O x4   Dressing: L hip - CDI   Diet: Regular, 75%   LDA: PIV R forearm - SL   GUERRERO drain - Small amount of serosanguinous drains.    Equipment: IV pole/pump, and pt personal belongings.    Plan: TBD   Additional Info: No insulin administered per parameter of BG.       "

## 2023-06-01 NOTE — PROGRESS NOTES
Cardiology Consult Progress Note     ASSESSMENT:     #Chest pain  #Acute myocardial injury  #Acute blood loss anemia  #HLD  #T2DM  #JAIR  #Hx of DVT/PE in 2018     Mr Bustos is a 71yo M w a hx as below who was adm on 5/26 for a L-BELLA who on 5/31 developed transient chest pain that self-resolved with no medical interventions, trops were found to be elevated (46-51-51) with no significant delta, TTE did not show decreased EF of new FWMA. Given his HEART score is 4 (HLD, T2DM, age, trops) we would like assess coronary anatomy with CCTA, but Mr Bustos does not want to undergo any further study. We extensively explain the imaging study, the reasons why we would like to do it and the possible consequences of not going forward with it.     RECOMMENDATIONS:  - CCTA can be reconsidered as an outpatient.    Patient seen and discussed with Dr. Samuels, who agrees with above plan.      Thank you for consulting the cardiovascular services at the [facility], we will sign off. Please do not hesitate to call us with any questions.     Franco Solorzano MD  Internal Medicine PGY2      REASON FOR CONSULT: Chest pain    Subjective: NAEON, no CP, SOB, per chart review        CURRENT MEDICATIONS:  No current outpatient medications on file.         Physical Exam   Temp: 97.4  F (36.3  C) Temp src: Oral BP: (!) 144/61 Pulse: 80   Resp: 16 SpO2: 94 % O2 Device: None (Room air)    Vital Signs with Ranges  Temp:  [96.9  F (36.1  C)-97.4  F (36.3  C)] 97.4  F (36.3  C)  Pulse:  [77-80] 80  Resp:  [15-16] 16  BP: (130-144)/(56-61) 144/61  SpO2:  [94 %-95 %] 94 %  243 lbs 4.79 oz    Data     Recent Labs   Lab 06/01/23  0145 05/31/23  2151 05/31/23  1645 05/31/23  0805 05/31/23  0630 05/30/23  1727 05/30/23  1532 05/30/23  0929 05/30/23  0643 05/29/23  0812 05/29/23  0512 05/28/23  0822 05/28/23  0538 05/26/23  1142 05/26/23  0753   WBC  --   --   --   --  8.4  --  6.3  --   --   --   --   --   --   --  9.5   HGB  --    --   --   --  7.6*  --  7.9*  --   --   --   --   --  8.8*   < > 10.5*   MCV  --   --   --   --  98  --  94  --   --   --   --   --   --   --  95   PLT  --   --   --   --  297  --   --   --   --   --   --   --   --   --  309   NA  --   --   --   --  139  --   --   --  138  --  138  --  135*   < > 139   POTASSIUM  --   --   --   --  4.0  --   --   --  4.0  --  4.3  --  4.5   < > 4.4   CHLORIDE  --   --   --   --  104  --   --   --  104  --  104  --  101   < > 105   CO2  --   --   --   --  25  --   --   --  25  --  25  --  25   < > 24   BUN  --   --   --   --  26.0*  --   --   --  28.5*  --  33.6*  --  41.0*   < > 33.4*   CR  --   --   --   --  0.90  --   --   --  0.94  --  1.09  --  1.19*   < > 1.13   ANIONGAP  --   --   --   --  10  --   --   --  9  --  9  --  9   < > 10   MAIKOL  --   --   --   --  8.2*  --   --   --  8.0*  --  7.8*  --  7.9*   < > 8.9   * 157* 129*   < > 129*   < >  --    < > 135*   < > 147*   < > 121*   < > 90   ALBUMIN  --   --   --   --  3.2*  --   --   --   --   --   --   --   --   --  3.7   PROTTOTAL  --   --   --   --  5.8*  --   --   --   --   --   --   --   --   --  6.7   BILITOTAL  --   --   --   --  0.2  --   --   --   --   --   --   --   --   --  0.3   ALKPHOS  --   --   --   --  74  --   --   --   --   --   --   --   --   --  92   ALT  --   --   --   --  6*  --   --   --   --   --   --   --   --   --  6*   AST  --   --   --   --  14  --   --   --   --   --   --   --   --   --  10    < > = values in this interval not displayed.       Recent Results (from the past 24 hour(s))   Echocardiogram Complete   Result Value    LVEF  55-60%    [facility]    [id number]  [id number]  [id number]  [id number]^KINGS^BRENNA     [facility]  Echocardiography [facility]  [address] [place] [id number]     Name: SANDRA BRANCH  MRN: [id number]  : 1950  Study Date: 2023 03:33 PM  Age: 72 yrs  Gender: Male  Patient Location: [facility]  Reason For Study: Chest  Pain  Ordering Physician: BRENNA KU  Performed By: Allie Cabral     BSA: 2.3 m2  Height: 70 in  Weight: 243 lb  HR: 75  BP: 117/54 mmHg  ______________________________________________________________________________  Procedure  Complete Portable Echo Adult. Contrast Optison. Technically difficult  study.Extremely difficult acoustic windows despite the use of contrast for  endcardial border definition. Optison (NDC #8710-7383-86) given intravenously.  Patient was given 5 ml mixture of 3 ml Optison and 6 ml saline. 4 ml wasted.  ______________________________________________________________________________  Interpretation Summary  Global and regional left ventricular function is normal with an EF of 55-60%.  Global right ventricular function is normal. The right ventricle is normal  size.  IVC diameter and respiratory changes fall into an intermediate range  suggesting an RA pressure of 8 mmHg.  There is no prior study for direct comparison.  ______________________________________________________________________________  Left Ventricle  Global and regional left ventricular function is normal with an EF of 55-60%.  Left ventricular wall thickness is normal. Left ventricular size is normal.  Left ventricular diastolic function is indeterminate.     Right Ventricle  Global right ventricular function is normal. The right ventricle is normal  size.     Atria  The atria cannot be assessed.     Mitral Valve  The valve leaflets are not well visualized. Trace mitral insufficiency is  present.     Aortic Valve  The valve leaflets are not well visualized. On Doppler interrogation, there is  no significant stenosis or regurgitation.     Tricuspid Valve  The valve leaflets are not well visualized. Trace tricuspid insufficiency is  present.     Pulmonic Valve  The pulmonic valve cannot be assessed.     Vessels  The aorta root cannot be assessed. The thoracic aorta cannot be assessed. IVC  diameter and respiratory changes fall  into an intermediate range suggesting an  RA pressure of 8 mmHg.     Pericardium  No pericardial effusion is present.     Compared to Previous Study  There is no prior study for direct comparison.  ______________________________________________________________________________  MMode/2D Measurements & Calculations     IVSd: 0.73 cm  LVIDd: 3.4 cm  LVPWd: 0.72 cm  LV mass(C)d: 63.0 grams  LV mass(C)dI: 27.8 grams/m2  Ao root diam: 3.5 cm  LVOT diam: 2.4 cm  LVOT area: 4.5 cm2  RWT: 0.42     Doppler Measurements & Calculations  MV E max hosea: 78.8 cm/sec  MV A max hosea: 95.5 cm/sec  MV E/A: 0.83  MV dec time: 0.28 sec  E/E' av.4  Lateral E/e': 7.9  Medial E/e': 8.8     ______________________________________________________________________________  Report approved by: Archana Keys 2023 04:50 PM

## 2023-06-01 NOTE — PLAN OF CARE
Pt is A&Ox4. VSS. LS clear, on RA. BS active, LBM on 5/26/2023. Voiding well. Pain managed with oxycodone. L hip surgical dressing is c/d/I. L knee bent with pillow. GUERRERO patent and draining well. Pt up with 2 assist with lift device. L PIV is patent and SL. Continue to monitor.

## 2023-06-02 PROBLEM — R53.81 PHYSICAL DECONDITIONING: Status: ACTIVE | Noted: 2023-01-01

## 2023-06-02 NOTE — PROGRESS NOTES
Orthopaedic Surgery Progress Note 06/02/2023    S: No orthopedic or medical changes.  Awaiting discharge to TCU.    O:  Temp: (!) 96  F (35.6  C) Temp src: Oral BP: 122/48 Pulse: 65   Resp: 16 SpO2: 95 % O2 Device: None (Room air)      Exam:  Gen: No acute distress, resting comfortably in bed.  Resp: Non-labored breathing    Output by Drain (mL) 05/31/23 0700 - 05/31/23 1459 05/31/23 1500 - 05/31/23 2259 05/31/23 2300 - 06/01/23 0659 06/01/23 0700 - 06/01/23 1459 06/01/23 1500 - 06/01/23 2259 06/01/23 2300 - 06/02/23 0642   Closed/Suction Drain 1 Left;Lateral Hip Bulb 15 [place]  50            LE:  Dressings c/d/i   Abduction pillow in place  Fires GS/FHL, 1+ in TA and EHL  SILT SP/DP/Saph/Sural/T  2+ DP/PT, WWP    Recent Labs   Lab 05/31/23  0630 05/30/23  1532 05/28/23  0538 05/27/23  0714 05/26/23  0753   WBC 8.4 6.3  --   --  9.5   HGB 7.6* 7.9* 8.8*   < > 10.5*     --   --   --  309    < > = values in this interval not displayed.       Assessment: Waldo Bustos is a 72 year old male s/p L BELLA on 5/26. Doing well, but developing foot drop. Sensation is normal.    Today's plan  - Continue plan for discharge to TCU      Plan:  50% PWB LLE with posterior hip precautions for 3 months  Continue to monitor foot drop, AFO ordered  Keep knee bent in a foam leg elevator to relax the sciatic nerve  F/U OR cultures: NGTD  F/U Drain  Pain control  DVT ppx: Eliquis  PT/OT, OOB  Appreciate medicine recs: Glucose <200  F/U 4 weeks  Dispo planning    Future Appointments   Date Time Provider Department Center   5/27/2023  9:30 AM Ann Martinez PT URPT [place]   5/27/2023  1:00 PM Margot Luo, OT [facility]OT [place]   5/27/2023  3:30 PM UR PT WAITLIST URPT [place]   6/26/2023 10:20 AM Rom Meyers MD [facility]     Bartolome Verdugo MD

## 2023-06-02 NOTE — PLAN OF CARE
Problem: Depressive Signs/Symptoms  Goal: Optimized Energy Level (Depressive Signs/Symptoms)  Outcome: Not Progressing     Problem: Skin Injury Risk Increased  Goal: Skin Health and Integrity  Outcome: Not Progressing

## 2023-06-02 NOTE — PROGRESS NOTES
Care Management Follow Up    Length of Stay (days): 7    Expected Discharge Date: 06/03/23   Concerns to be Addressed: discharge planning     Patient plan of care discussed at interdisciplinary rounds: Yes    Anticipated Discharge Disposition: Transitional Care (Return to  TCU)    [facility]  [address]  [address] [place] [address]  Admissions: [phone]  RN Station: [phone]     Anticipated Discharge Services:  (Post Acute Therapies)  Anticipated Discharge DME: None    Patient/family educated on Medicare website which has current facility and service quality ratings: yes  Education Provided on the Discharge Plan: Yes  Patient/Family in Agreement with the Plan:  Yes    Referrals Placed by CM/SW:  (None at this time.)  Private pay costs discussed: MA for LTC eligibility and necessity to secure payment for TCU/LTC previously discussed    Additional Information:  Pt declined ultra sound yesterday, MD is ready to d/c pt back to TCU once bed becomes available.     ZEB updated  liaison, Linda Walter, of the above information yesterday as well. Linda reported pt is now on  TCU waitlist.        VANESA Minor, LSW  [facility]   PHONE: [phone]  Pager: [phone]

## 2023-06-02 NOTE — PROGRESS NOTES
Care Management Discharge Note    Discharge Date: 06/02/2023       Discharge Disposition: Transitional Care (Return to  TCU)    [facility]  [address]  [address]th [place] [address]  Admissions: [phone]  RN Station: [phone]    Discharge Services:  (Post Acute Therapies)    Discharge DME: None    Discharge Transportation: Transportation staff  Private pay costs discussed: MA for LTC eligibility and necessity to secure payment for TCU/LTC previously discussed    Does the patient's insurance plan have a 3 day qualifying hospital stay waiver?  No    PAS Confirmation Code:  [id number]  Patient/family educated on Medicare website which has current facility and service quality ratings: yes    Education Provided on the Discharge Plan: Yes  Persons Notified of Discharge Plans: Patient, bedside nurse, charge nurse, [facility] at [facility], Dr. Smith, JOSEPH Franco  Patient/Family in Agreement with the Plan:  Yes    Handoff Referral Completed: Yes    Additional Information:  ZEB in communication with  liaison, Leah Diaz; confirmed discharge to  TCU at 1:30 p.m. today.    1150: ZEB paged JOSEPH Franco, and Dr. Smith, provided update on discharge plan. Bedside nurse and charge nurse updated as well.    1200: ZEB met with pt at bedside, provided update on discharge plan. ZBE discussed IMM with pt, no further questions re: discharge plan. However, pt had extensive concerns re: the care he's been receiving at . ZEB provided pt with Patient Relations phone number, encouraged pt to advocate for himself within healthcare system while actively working to the best of his ability on his own recovery. ZEB provided education on Medicare TCU benefit and discharge being based on safe, feasible plan. Pt continued to voice his complaints to ZEB for 30 mins. ZEB validated pt's experience and concerns, while reiterating his role in his own well-being.    1330: New PAS [facility] from [facility], as it has been over  60 days since his previous PAS. SW informed Leah that it will be completed a little later, but before end of day.    1550: PAS completed (documented above) and Leah updated.        VANESA Minor, LSW  [facility]   PHONE: [phone]  Pager: [phone]

## 2023-06-02 NOTE — PROGRESS NOTES
"ZEB cut and paste Initial Assessment.  Pt has been back and forth to the hospital since first admission. Nothing in the assessment has changed.  Pt was sent to the hospital this time to get a new hip put in.  Pt was discharge from TCU on the  and now readmitted on 23.  SW will have weekend SW do new BIM's and PHQ.  No other changes have been made.   New PAS is  [id number]    Social Work: Initial Assessment with Discharge Plan     Patient Name: Waldo Bustos  : 1950  Age: 72 year old  MRN: [id number]  Completed assessment with: chart review and patient interview  Admitted to TCU: 22     Presenting Information   Date of SW assessment: 2022  Health Care Directive: Provided Copy and Provided education  Primary Health Care Agent: patient  Secondary Health Care Agent: Adoptive brother Gerry \"Lalo\" Akash is NOK, per patient  Living Situation: resides with significant other, Claire Das in a multi-level home  Previous Functional Status: independently ambulated with cane PTA, also drove and tended to ADL's independently  DME available: cane  Patient and family understanding of hospitalization: appropriate and pleasant  Cultural/Language/Spiritual Considerations: pt is a 71 yo male who is in a relationship with significant other   Abuse concerns: none  -------------------------------------------------------------------------------------------------------------  TRANSPORTATION:    Has lack of transportation kept you from medical appointments, meetings, work, or from getting things needed for daily living?  A. Yes, it has kept me from medical appointments or from getting my medications  B. Yes, it has kept me from non-medical meetings, appointments, work or from getting things that I need  C. No  X. Patient Unable to respond  Y. Patient declines to respond  -------------------------------------------------------------------------------------------------------------  Health Literacy: " "  How Often do you need to have someone help you when you read instructions, pamphlets, or other written material from your doctor or pharmacy?  0.       Never  1.       Rarely  2.       Sometimes  3.       Often  4.       Always  5.       Patient declines to respond  6.       Patient unable to respond  ------------------------------------------------------------------------------------------------------------   BIMS:  See flow sheet   Tell the pt : \"I am going to say three words for you to remember.  Please repeat the words after I have said all three.  The words are:Sock, Blue and Bed. Now tell me the three words.\"      Number of words repeated after the first attempt.  0: None   1: One  2: Two  3: Three  Ask the pt: \"Please tell me what year it is right now?\"  0: Missed by 5 >5 years or no answer   1: Missed by 2-5 years  2: Missed by a 1 year  3: Correct       Ask the pt: \"What month are we in right now?\"  0: Missed by > 1 month or no answer.  1: Missed by 6 days to 1 month  2: Accurate within 5 days.      Ask pt: \"what day of the week is today?\"  0: Incorrect day of the week.  1: Correct.      Ask pt:\" Let's go back to an earlier question.  What were those three words that I asked you to repeat?\" If unable to remember a word, give a cue (something to wear, a color, a piece of furniture) for that word.   Able to recall Sock.  0: No, could not recall.  1: Yes, after cueing (something to wear)  2: Yes, no cueing required.   Able to recall Blue.  0: No, could not recall.  1:Yes, after cueing (a color)  2:Yes, no cueing required.   Able to recall Bed.  0: No,  1: Yes, after cueing (a piece of furniture)  2: Yes, no cueing required.   -----------------------------------------------------------------------------------------------------------  CAM:  1.) Acute Onset/Fluctuating Course:  Is there evidence of an acute change in mental status from the patient's baseline?  0. No  1. Yes  2.) Inattention:  Does the patient " have difficulty focusing attention, for example, being easily distractable, or having difficulty keeping track of what was being said?  0. No - Behavior not present  1. Yes - Behavior present  3.) Disorganized Thinking:  Is the patient's speech disorganized or incoherent, such as rambling or irrelevant conversation, unclear or illogical flow of ideas, or unpredictable switching from subject to subject?  0. No - Behavior not present  1. Yes - Behavior present  4.) Altered level of consciousness:  Did the patient have altered level of consciousness as indicated by any of the following criteria?  0. No - Alert  1. Yes - Vigilant (hyperalert), lethargic (drowsy) , stupor (difficult to arouse) or comatose (unable to be aroused)  -------------------------------------------------------------------------------------------------------------  PHQ-9:   Over the last 2 weeks, have you been bothered by any of the following problems?      If symptom is present, then ask the patient:  About how often have you been bothered by this?   Symptom Presence                                              Symptom Frequency  0. No                                                                     0. Never or 1 day  1. Yes                                                                   1. 2-6 days (several days)  9. No Response                                                    2. 7-11 days (half or more of the days)                                                                                3. 12-14 days (nearly every day)       Present Frequency   A.       Little interest of pleasure doing things  0     B.       Feeling down, depressed, or hopeless  0     C.       Trouble falling or staying asleep, or sleeping too much       D.       Feeling tired or having little energy       E.       Poor appetite or overeating       F.        Feeling bad about yourself - or that you are a failure, or have let yourself or your family down      "  G.      Trouble concentrating on things, such as reading the newspaper or watching television       H.      Moving or speaking so slowly that other people could have noticed. Or the opposite - being so fidgety or restless that you have been moving around a lot more than usual       I.         Thoughts that you would be better off dead, or of hurting yourself in some way          -------------------------------------------------------------------------------------------------------------  SOCIAL ISOLATION  How often do you feel lonely or isolated form those around you?  0.       Never  1.       Rarely  2.       Sometimes  3.       Often  4.       Always  5.       Patient declines to respond  6.       Patient unable to respond  -------------------------------------------------------------------------------------------------------------     BIMS: Pt scored 15 on BIMS indicating cognition intact  PHQ-9: Pt scored 00 on PHQ-9 indicating normative  PAS: confirmation number- [id number]  Has there been a level II screen?  No  Were there any recommendations in the screen? N/A  If yes, will the recommendations we incorporated into the Plan of Care?  N/A  Physical Health  Reason for admission:   Per H&P,  \"Patient is a 73 y/o man who has multiple medical problems including diabetes mellitus type II, rheumatoid arthritis, obstructive sleep apnea and past DVT/PE. Patient had undergone left total hip arthroplasty for left hip osteoarthritis on 07-Mar-2018. 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 Patient apparently was treated with ceftriaxone and amoxicillin following this. Patient had a left hip aspiration in Feb-2021. 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 Patient had fluid aspiration and drain " "placement on 06-Apr-2022. Cultures grew Cutibacterium species. Patient last was hospitalized from 22-Nov-2022 to 24-Nov-2022. Patient underwent explantation of left longstem total hip arthroplasty, extended proximal femoral trochanteric osteotomy left femur and manufacture and placement of vancomycin tobramycin impregnated cement spacer implant on 22-Nov-2022 for left hip prosthetic joint infection. Cultures grew Proteus mirabilis and Finegoldia magna. During hospital stay, patient also had acute encephalopathy, acute blood loss anemia, acute hypoxemic respiratory failure and acute kidney injury. Patient received 4 units of pRBCs during hospital stay for acute blood loss anemia. Acute encephalopathy, acute hypoxemic respiratory failure and acute kidney injury had resolved by the time of discharge. Patient was discharged to TCU on 24-Nov-2022.\"        Provider Information   Primary Care Physician:Jv Felix   : none reported     Mental Health:   Diagnosis: none  Current Support/Services: none  Previous Services: none  Services Needed/Recommended: none     Substance Use:  Diagnosis: none  Current Support/Services: none  Previous Services: none  Services Needed/Recommended: none     Support System  Marital Status: single   Family support: significant other, Claire Das  Other support available: adoptive brotherGerry \"Lalo\" [facility] [phone]  Gaps in support system: none noted     Community Resources  Current in home services: none  Previous services: none     Financial/Employment/Education  Employment Status: retired  Income Source: nursing home  Education: not assessed  Financial Concerns:  Insurance coverage and cost of TCU stay  Insurance: [employer] MEDICARE ADVANTAGE     Discharge Plan   Patient and family discharge goal: return home when therapy goals are reached and patient can navigate house with stairs  Provided Education on discharge plan: YES  Patient " agreeable to discharge plan:  YES  A list of Medicare Certified Facilities was provided to the patient and/or family to encourage patient choice. Based on location and rating, patient would like referrals made to: NA  General information regarding anticipated insurance coverage and possible out of pocket cost was discussed. Patient and patient's family are aware patient may incur the cost of transportation to the facility, pending insurance payment: YES  Barriers to discharge: medical stability and therapy goals reached     Discharge Recommendations   Disposition: to be determined  Transportation Needs: TBD  Name of Transportation Company and Phone: none     Additional comments   Swrk will remain available and writer provided patient with primary unit  contact information.     Ghada Morales [facility], Transitional Care Unit   Social Work

## 2023-06-02 NOTE — PLAN OF CARE
Occupational Therapy Discharge Summary    Reason for therapy discharge:    Discharged to transitional care facility.    Progress towards therapy goal(s). See goals on Care Plan in [facility] electronic health record for goal details.  Goals not met.  Barriers to achieving goals:   discharge from facility.    Therapy recommendation(s):    Continued therapy is recommended.  Rationale/Recommendations:  to maximize safety and IND with functional mobility and ADLs.

## 2023-06-02 NOTE — CARE PLAN
RN: warning for using eliquis and ibuprofen ok for this pt to use together per pharmacist verification, warning over ride and dismissed.

## 2023-06-02 NOTE — PLAN OF CARE
Patient is a 72 year old male  admitted to room 414 via wheelchair.  Patient is alert and oriented X 3. See Epic for VS and assessment.  Patient is able to transfer A2  using lift. Patient was settled into their room, shown call light, tv, mealtimes etc. Oriented to unit. Will continue monitoring pain level and VS. Notifying MD with any concerns.  Follow MD orders for cares and medications.    Flu Vaccine - do they want the flu vaccine, if applicable? __2__(If flu vaccine is due, pls ask patient if they would be interested in the flu vaccine and follow the prompts to the left of the chart)    COVID Vaccine: _0_ of 5 (Typically, patients should have four COVID vaccines plus the Bivalent booster. Under immunizations, please verify where the patient falls and offer the COVID booster, if applicable. If patient is interested, please add on sticky for provider to order. If declines, please add in comments.) Comments:        Level of Schooling:college  Ethnicity:  Marital Status:  Dentures: No  Hearing Aid: No  Smoker:  No  Glasses: Yes  Occupation: Retired  Falls 0-1 mo: 0 2-6 mo: 0  Stairs prior function: Needed some help  Prior device use: Walker   Advanced Care Directive Referral to Social Work?No    Goal Outcome Evaluation:  Patient refused for full body assessment. Writer only saw patient L hip incision  CDI, with GUERRERO drain.Refused for her wedge foam so that knee can bent and helps relax the sciatic nerve per ortho instruction. Endorsed to the incoming nurse.

## 2023-06-02 NOTE — PROGRESS NOTES
[facility]- Transitional Care Unit  Extended Progress Note  Waldo Bustos  [id number]  June 2, 2023     Assessment and Plan:   Mr. Waldo Bustos is a 71 yo male with hx of T2DM, HLD, chronic neck pain, JAIR, DVT/PE on chronic AV and chronic L hip prosthetic joint infection initially admitted to [facility] 11/22/22 for scheduled explantation of L hip prothesis, debridement and reconstruction with antibiotic spacer. Post-op course c/b profound deconditioning, transferred to  TCU for rehab; however therapies no longer worked with pt after several months of in-bed therapy and refusals to mobilize. Underwent biopsy 4/7 with no e/o infection. Transferred back to [facility] and is now s/p L BELLA reimplantation, 5/26, with post-op course again c/b severe deconditioning, [facility].     # S/p L hip explantation of L hip antibiotic spacer and revision L BELLA, 5/26/23: By Dr. Meyers's team. Post-op developed L foot drop. AFO ordered but pt declining to use. OR cultures NGTD. Pain stable. Incision healing well.   - Activity: 50% PWB LLE with posterior hip precautions x 3 months.   - AFO ordered for foot drop but pt continues to decline  - PT/OT  - Keep knee bent in a foam leg elevator to relax the sciatic nerve   - Pain: Schedule Robaxin 500 mg QID and continue prn Tylenol and oxycodone  - Follow up with Dr. Meyers in clinic as scheduled on 6/26.     # Chronic neck pain: Stable. Continue pain regimen as above.     # Ajustment disorder with depressed mood  # Prolong grief disorder  Depressed mood in setting of ongoing health issues. Passive suicidal statements to nursing staff in the past. Psychiatry saw pt on 5/31 in hospital due to lack of motivation to work with therapies, however, pt declined interest in starting any mood altering medications, but more willing to engage with Health Psychology.   - Health psychology was not available to follow  here. Consider reinitiating post discharge when Health Psychology is able to connect with him.     # Diabetes mellitus type 2 (A1c 5.8% 3/6/23): Most recent A1C 5.8% on 3/6/23. PTA meds on hold since surgery given lower sugars post-op save sliding scale.   Recent Labs   Lab 06/02/23  1001 06/02/23  0205 06/01/23  2132 06/01/23  1802 06/01/23  1221 06/01/23  0901   * 121* 172* 128* 137* 133*      - Continue Novolog medium sliding scale  - Continue to hold PTA glipizide, metformin, Actos, and Victoza for now  - Accuchecks TID before meals and at bedtime  - Hypoglycemic protocol.     # Hyperlipidemia: Continue Lipitor 40mg once daily     # Obstructive sleep apnea: Continue nasal CPAP whenever sleeping     # Unprovoked DVT in L common femoral, deep and superficial femoral, popliteal, posterior tibial veins and PE (2018) on chronic anticoagulation  - Continue PTA apixaban 5 mg BID      Discussed with Dr. Brink.       Diet and/or tube feedings: Regular  Lines, tubes, drains: GUERRERO bulb at surgical site  DVT prophylaxis: DOAC  Indications for psychotropic medications: N/A  Code status discussed on admission: Full Code  Pneumococcal vaccination status: UTD     Consults:   PT/OT         History of Present Illness:   Please refer to discharge summarydated 6/2 by Jessica Hodge CNP, for full details. In brief, Mr. 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 11/22/22 for scheduled explantation of L hip prothesis, debridement and reconstruction with antibiotic spacer. Post-op course c/b profound deconditioning, transferred to  TCU for rehab; however therapies no longer worked with pt after several months of in-bed therapy and refusals to mobilize. Underwent biopsy 4/7 with no e/o infection. Transferred back to [facility] and is now s/p L BELLA reimplantation, 5/26, with post-op course  again c/b severe deconditioning, transferred back to  TCU 6/2 for ongoing rehabilitation.    Currently pt admits to stable L hip pain and chronic neck pain. Denies other acute physical concerns at this time including fevers, chills, chest pain, SOB, nausea, abd pain, bowel and bladder concerns.          Physical Exam:   Vitals were reviewed  Blood pressure 113/58, pulse 70, temperature (!) 96.4  F (35.8  C), temperature source Oral, resp. rate 16, SpO2 96 %.  GEN: In NAD  HEENT: NCAT; PERRL; sclerae non-icteric  LUNGS: CTAB  CV: RRR  ABD: +BSs; SNTND  EXT: No BLE edema; L hip incision covered with c/d/i drainage. GUERRERO bulb intact  SKIN: No acute rashes noted on exposed areas.  NEURO: AAOx3; CNs grossly intact; No acute focal deficits noted.           Past Medical History:     Past Medical History:   Diagnosis Date     Arthritis 5 years ago     Chronic osteoarthritis Not sure     Diabetes (H) 10-12 years ago     DVT (deep venous thrombosis) (H)      Dyslipidemia      Gastroesophageal reflux disease      History of blood transfusion      Iliopsoas abscess (H)      Infection of prosthetic hip joint (H)      JAIR (obstructive sleep apnea)      Pulmonary embolism (H)      RA (rheumatoid arthritis) (H) Not sure     Reduced vision 2-3 years ago    Cataract Surgery on both eyes     Venous insufficiency              Past Surgical History:      Past Surgical History:   Procedure Laterality Date     ARTHROPLASTY REVISION HIP Left 5/26/2023    Procedure: Revision Left Total Hip Arthroplasty;  Surgeon: Rom Meyers MD;  Location: [facility] OR     ASPIRATION NEEDLE HIP Left 4/7/2023    Procedure: ARTHROCENTESIS, LEFT HIP;  Surgeon: Rom Meyers MD;  Location: [facility] OR     Cataract       COLONOSCOPY       EGD       IR FINE NEEDLE ASPIRATION W ULTRASOUND  3/6/2023     IR IVC FILTER PLACEMENT      removed     IRRIGATION AND DEBRIDEMENT HIP, PLACE ANTIBIOTIC CEMENT BEADS / SPACER Left 11/22/2022    Procedure: and Reconstruction Using G  20 Prosthetic Antibiotic Cement Spacer;  Surgeon: Rom Meyers MD;  Location: [facility] OR     REMOVE ANTIBIOTIC CEMENT BEADS / SPACER HIP Left 5/26/2023    Procedure: Explantation of Left Hip Antibiotic Spacer;  Surgeon: Rom Meyers MD;  Location: [facility] OR     REMOVE HARDWARE ARTHROPLASTY HIP Left 11/22/2022    Procedure: Explantation of Chronic  Infected Left Total Hip Arthroplasty, Extended Trochanteric Osteotomy with Extensive Debridement;  Surgeon: Rom Meyers MD;  Location: [facility] OR     SYNOVIAL BIOPSY HIP Left 4/7/2023    Procedure: BIOPSY, SYNOVIUM, LEFT  HIP, percutaneous;  Surgeon: Rom Meyers MD;  Location: [facility] OR     ZZC PELVIS/HIP JOINT SURGERY UNLISTED       [facility] STOMACH SURGERY PROCEDURE UNLISTED  1954    2 Hernia surgeries as a child             Family History:     Family History   Adopted: Yes   Problem Relation Age of Onset     Diabetes No family hx of      Rheumatoid Arthritis No family hx of      Low Back Problems No family hx of      Unknown/Adopted No family hx of              Social History:     Social History     Tobacco Use     Smoking status: Never     Smokeless tobacco: Never   Vaping Use     Vaping status: Not on file   Substance Use Topics     Alcohol use: No        Living situation prior to admission: [place]         Medications:   No current facility-administered medications on file prior to encounter.  acetaminophen (TYLENOL) 325 MG tablet, Take 2 tablets (650 mg) by mouth every 4 hours as needed for other (For optimal non-opioid multimodal pain management to improve pain control.)  apixaban ANTICOAGULANT (ELIQUIS) 5 MG tablet, Take 5 mg by mouth 2 times daily  atorvastatin (LIPITOR) 40 MG tablet, Take 40 mg by mouth daily  ibuprofen (ADVIL/MOTRIN) 600 MG tablet, Take 1 tablet (600 mg) by mouth every 6 hours as needed for inflammatory pain  insulin aspart (NOVOLOG PEN) 100 UNIT/ML pen, Inject 1-7 Units Subcutaneous 3 times daily (before meals)  insulin aspart (NOVOLOG PEN) 100  UNIT/ML pen, Inject 1-5 Units Subcutaneous At Bedtime  lactobacillus rhamnosus, GG, (CULTURELL) capsule, Take 1 capsule by mouth 2 times daily  methocarbamol (ROBAXIN) 500 MG tablet, Take 1 tablet (500 mg) by mouth every 6 hours as needed for muscle spasms  oxyCODONE (ROXICODONE) 5 MG tablet, Take 1 tablet (5 mg) by mouth every 4 hours as needed for moderate pain  polyethylene glycol (MIRALAX) 17 g packet, Take 17 g by mouth daily  senna-docusate (SENOKOT-S/PERICOLACE) 8.6-50 MG tablet, Take 1 tablet by mouth 2 times daily             Allergies:     Allergies   Allergen Reactions     Sulfa Antibiotics      Other reaction(s): *Unknown - Childhood Rxn     Tizanidine Other (See Comments)     Frequent urination; causes drowsiness, and dry mouth               Labs:     CMPRecent Labs   Lab 06/02/23  1001 06/02/23  0205 06/01/23  2132 06/01/23  1802 05/31/23  0805 05/31/23  0630 05/30/23  0929 05/30/23  0643 05/29/23  0812 05/29/23  0512 05/28/23  0822 05/28/23  0538   NA  --   --   --   --   --  139  --  138  --  138  --  135*   POTASSIUM  --   --   --   --   --  4.0  --  4.0  --  4.3  --  4.5   CHLORIDE  --   --   --   --   --  104  --  104  --  104  --  101   CO2  --   --   --   --   --  25  --  25  --  25  --  25   ANIONGAP  --   --   --   --   --  10  --  9  --  9  --  9   * 121* 172* 128*   < > 129*   < > 135*   < > 147*   < > 121*   BUN  --   --   --   --   --  26.0*  --  28.5*  --  33.6*  --  41.0*   CR  --   --   --   --   --  0.90  --  0.94  --  1.09  --  1.19*   GFRESTIMATED  --   --   --   --   --  >90  --  87  --  73  --  65   MAIKOL  --   --   --   --   --  8.2*  --  8.0*  --  7.8*  --  7.9*   PROTTOTAL  --   --   --   --   --  5.8*  --   --   --   --   --   --    ALBUMIN  --   --   --   --   --  3.2*  --   --   --   --   --   --    BILITOTAL  --   --   --   --   --  0.2  --   --   --   --   --   --    ALKPHOS  --   --   --   --   --  74  --   --   --   --   --   --    AST  --   --   --   --   --  14  --    --   --   --   --   --    ALT  --   --   --   --   --  6*  --   --   --   --   --   --     < > = values in this interval not displayed.     CBC  Recent Labs   Lab 05/31/23  0630 05/30/23  1532 05/28/23  0538 05/27/23  0714   WBC 8.4 6.3  --   --    RBC 2.51* 2.54*  --   --    HGB 7.6* 7.9* 8.8* 9.9*   HCT 24.5* 23.9*  --   --    MCV 98 94  --   --    MCH 30.3 31.1  --   --    MCHC 31.0* 33.1  --   --    RDW 14.0 14.1  --   --      --   --   --          Gilson Stanton PA-C  [facility]  [phone]

## 2023-06-02 NOTE — PLAN OF CARE
Goal Outcome Evaluation:  Pt slept throughout the night without complaints.    Orientation: Aox4  Bowel:Continent, uses bedpan  Bladder: Continent, uses urinal  Pain: Neck  Ambulation/Transfers: ax2 with lift  Diet/ Liquids: Regular  Tubes/ Lines/ Drains: R PIV SL, GUERRERO drain  Tube Feeding: n/a   Oxygen: none  Skin: visible skin intact

## 2023-06-02 NOTE — PLAN OF CARE
Goal Outcome Evaluation:           VS: VSS  Temp: 96.8  F (36  C) Temp src: Oral BP: 139/57 Pulse: 73   Resp: 16 SpO2: 94 % O2 Device: None (Room air)     O2: >90% on RA, denies SOB or CP. LSCTA   Output: Voiding without difficulty in urinal   Last BM: LBM 5/26 per pt, +flatus. Given senna and miralax.   Activity: Pt not OOB, refused activity, refused RN to even touch LLE.    Skin: Visible kin intact ex for L hip, refused full skin assessment.   Pain: Pain to L hip, neck, and L foot managed with PRN oxycodone    CMS: CMS intact ex L foot drop, DEEP DP to L foot as pt refused, DP +1 to RLE.  Trace generalized edema.    Dressing: Dressing to L hip CDI, small amount of dried contained drainage to drain site.   Diet: Regular diet, denies N/V. Poor appetite. Adequate intake of PO fluids. - declined scheduled insulin.    LDA: PIV removed for discharge   GUERRERO drain in place- 25mL output for shift.   Equipment: Personal belongings   Plan: Anticipate discharge to TCU  if accepted around 130-2pm.    Additional Info: Flat affect, depressed state. Expressed feelings of hopelessness due to activity level and foot drop.   Pt needs AFO but unwilling to pay cost for orthotics- Dr. Meyers requests pt use alternative device for time being and order one off [employer].      DISCHARGE SUMMARY    Pt discharging to:  TCU  Transportation: Transport in hospital, on bed  AVS given and discussed: N/A- transferring to [facility]  Stoplight Tool given and discussed: N/A- transferring to TCU  Medications given: N/A- premedicated prior to transport.  Belongings returned: Yes  Comments: Pt left unit 5 ortho at 1345 in bed. RN to RN handoff was given to [facility].

## 2023-06-02 NOTE — PLAN OF CARE
Physical Therapy Discharge Summary    Reason for therapy discharge:    Discharged to transitional care facility.    Progress towards therapy goal(s). See goals on Care Plan in [facility] electronic health record for goal details.  Goals partially met.  Barriers to achieving goals:   discharge from facility.    Therapy recommendation(s):    Continued therapy is recommended.  Rationale/Recommendations:  progress strength and functional IND.

## 2023-06-03 NOTE — CARE PLAN
"RN: using prn oxy, tylenol, and robaxin this morning prior to OT evaluation and states \"helps\". Quarter sized blood jane on pad after pt sitting EOB, oozed out of surgical dressing, clear dressing, no further drainage, no dressing change done.   No bm recorded since 5/27, provider updated in sticky emar note.   Pt passing gas, bowel sounds active x 4 quadrants, no nausea or other adverse sx, scheduled bowel meds given.   Pt had complete bed bath by OT.  Makes needs known, call light in reach.   Prn pain meds given shift end.  Pt declines to move left foot and leg while in bed.     Patient's most recent vital signs are:     Vital signs:  BP: 130/61  Temp: 96.2  HR: 78  RR: 16  SpO2: 95 %     Patient does not have new respiratory symptoms.  Patient does not have new sore throat.  Patient does not have a fever greater than 99.5.         "

## 2023-06-03 NOTE — PLAN OF CARE
Goal Outcome Evaluation:    FOCUS/GOAL    Bowel management, Bladder management, Medication management, Pain management, Wound care management, Mobility, Skin integrity and Safety management    ASSESSMENT, INTERVENTIONS AND CONTINUING PLAN FOR GOAL:    Pt is A&OX4, calm, & cooperative with care. Denied CP, SOB, & n/v. A of 2 with Liko lift for transfer; was not OOB this shift. NWB to the LLE. Pt remains refusing skin assessment. Continent for both B&B; uses bedpan for BM. Urinal at the bedside for bladder. No BM this shift. Takes med whole with thin liquid. Pt did not request for pain med this shift. Dressing to L hip CDI. GUERRERO drain intact with bright red output. Pt slept late this shift & no acute issue. Able to make needs known & call light within reach. Will continue with POC.    Patient's most recent vital signs are:     Vital signs:  BP: 117/61  Temp: 96.9  HR: 77  RR: 16  SpO2: 96 %     Patient does not have new respiratory symptoms.  Patient does not have new sore throat.  Patient does not have a fever greater than 99.5.

## 2023-06-03 NOTE — PLAN OF CARE
Goal Outcome Evaluation:      Plan of Care Reviewed With: patient    Overall Patient Progress: improving    Outcome Evaluation: back to TCU    Diagnosis: Deconditioning post L hip I&D and revision  Mental Status: A&O x4, flat affect  Activity/dangle: Ax2 lift  Diet: Regular, poor intake  Pain: High, has developed foot drop LLE and refuses AFO due to cost. States foot burns and it radiates up the leg.  Tran/Voiding: Urinal  Tele/Restraints/Iso: No  02/LDA: GUERRERO drain  Other Info: Pt has not had BM in long time per pt after refusing bowel meds post op for some time due to having loose stools prior  Patient's most recent vital signs are:      Vital signs:  Temp: 96.9  F (36.1  C) Temp src: Oral BP: 117/61 Pulse: 77   Resp: 16 SpO2: 96 % O2 Device: None (Room air)         Patient does not have new respiratory symptoms.  Patient does not have new sore throat.  Patient does not have a fever greater than 99.5.

## 2023-06-03 NOTE — PROGRESS NOTES
"   06/03/23 0714   Appointment Info   Signing Clinician's Name / Credentials (OT) Aga Reddy, OTR/L CBIS   Rehab Comments (OT) OT: initial eval and Rx   Living Environment   People in Home significant other  (Claire)   Current Living Arrangements house  ([place] level)   Living Environment Comments OT: pt has been in hosp/TCU for several months, prior to admit pt was living [place]/ [place] [facility] [place] [facility] [place], reports Claire is not working and drives but has been having car trouble but reports she has not been to see him previous extended TCU stay or present TCU stay, pt reports owning cane, 4WW,2WW , used the lower levle bathroom w/ walk in shower w/ shower chair and grabbars, toilet is \"high\" but no grabbars.   Self-Care   Usual Activity Tolerance fair   Current Activity Tolerance poor   General Information   Onset of Illness/Injury or Date of Surgery 05/26/23  (4/1/2022 infected L hip prothesis,iliopsoas abscess,admit hosp 11/2022 , pt between hosp and TCU since admit, 5/26/23 revision L BELLA)   Referring Physician Nii Brink A, MD   Patient/Family Therapy Goal Statement (OT) (S)  \"get up\", \"out of bed on my own', 'get on my feet\",'sit on a commode'   Additional Occupational Profile Info/Pertinent History of Current Problem per chart review:Mr. 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 Post-op course c/b profound deconditioning, transferred to  TCU for rehab; however therapies no longer worked with pt after several months of in-bed therapy and refusals to mobilize. Underwent biopsy 4/7 with no e/o infection. Transferred back to [facility] and is now s/p L BELLA reimplantation, 5/26, with post-op course again c/b severe deconditioning, " "transferred back to  TCU 6/2 for ongoing rehabilitation.   Existing Precautions/Restrictions (S)  no hip IR;no hip ADD past midline  (no hip ADD past midline; 90 degree hip flexion;50% WB LLE, L hip drain)   Left Upper Extremity (Weight-bearing Status) full weight-bearing (FWB)   Right Upper Extremity (Weight-bearing Status) full weight-bearing (FWB)   Left Lower Extremity (Weight-bearing Status) partial weight-bearing (PWB)  (50%)   Right Lower Extremity (Weight-bearing Status) full weight-bearing (FWB)   General Observations and Info pt requires extra time and repeated explanations to engage in theraputic activiities   Cognitive Status Examination   Cognitive Status Comments OT: basic cog appears WFL   Visual Perception   Visual Acuity glassses \"with me all the time but usually use reading glasses\"   Pain Assessment   Patient Currently in Pain (S)    (c/o chronic pain in neck , recent pain in L hip and low back (sciatic pain) due during this session 6/3 pt reports most pain was in L knee and ankle)   Range of Motion Comprehensive   General Range of Motion   (R dom, Rue sh <full reports due to old \"baseball injury\" and LUE WNL)   Strength Comprehensive (MMT)   Comment, General Manual Muscle Testing (MMT) Assessment generalized weakness,   Bed Mobility   Comment (Bed Mobility) oT: see gg codes   Transfers   Transfer Comments oT: see gg codes   Activities of Daily Living   BADL Assessment/Intervention   (oT: see gg codes)   Clinical Impression   Criteria for Skilled Therapeutic Interventions Met (OT) Yes, treatment indicated   OT Diagnosis decreased indep w/adls/mobiilty   OT Problem List-Impairments impacting ADL problems related to;activity tolerance impaired;fear & anxiety;flexibility;range of motion (ROM);strength;pain;post-surgical precautions  (pt's peferences, L foot drop, pt's large habitius)   Assessment of Occupational Performance 3-5 Performance Deficits   Identified Performance Deficits drg, bathing, " "toileting, transfers, bed mob   Planned Therapy Interventions (OT) ADL retraining;bed mobility training;strengthening;transfer training;progressive activity/exercise  (educ on hip precautions/approach to adls/mobility)   Clinical Decision Making Complexity (OT) moderate complexity   Anticipated Equipment Needs Upon Discharge (OT)   (TBD depending on d/c setting)   Risk & Benefits of therapy have been explained evaluation/treatment results reviewed;care plan/treatment goals reviewed;risks/benefits reviewed;current/potential barriers reviewed;participants voiced agreement with care plan;participants included;patient   Clinical Impression Comments (S)  OT: pt known to this therapist due to previous recent stay on TCU, th had lengthy discussion w/ pt re: history of refusals to therapy or selective on tasks willing to partic in , pt indicated \"I didn't refuse. I couldn't do it\", when questioned further pt indicated he \"couldn't do it \" due to pain and weakness and new' foot drop\", pt resistant to AFO due to cost , pt identied muliple goals listed in \"pt goal statement' section above, th futher discussed w/ pt what it would take to reach those goals and that he would need to partic even w/ pain due to unrealistic to  antic he would be completely pain free even w/ pain medication on board. pt agreed to partic in bed mobility etc during Eval/Rx this session, but requird max A of 2 people for cares and mobiity w/ limited kolby to sitting at EOB w/tendency to lean/fall posteriorly. pt has posterior hip precautions and LLE partial wb at 50% which antic will be difficult for pt maintain, OT recommending 4week LOS if pt participates fully and progresses w/ goal to maximize safety and indep w/ adls/mobility and decrease burden of care/improve quality of life and assess for needed AE .   OT Total Evaluation Time   OT Eval, Moderate Complexity Minutes (69784) 15   Therapy Certification   Start of Care Date 06/03/23   Certification " date from 06/03/23   Certification date to 07/02/23   OT Goals   Therapy Frequency (OT) 6 times/wk   OT Predicted Duration/Target Date for Goal Attainment 06/30/23   OT Goals Hygiene/Grooming;Lower Body Dressing;Upper Body Dressing;Upper Body Bathing;Lower Body Bathing;Transfers;Bed Mobility;Toilet Transfer/Toileting   Self-Care/Home Management   Self-Care/Home Mgmt/ADL, Compensatory, Meal Prep Minutes (02689) 45   Treatment Detail/Skilled Intervention OT: educ pt on approach to bed mob and adls w/ hip precautions and LLE 50% WB restriction. lengthy discussion on pt originated goals and plan/progression of care that it will take to get to goals. see gg codes below   OT Discharge Planning   OT Plan OT: LLE 50% WB, posterior hip precautions(no hip ADD past midline; 90 degree hip flexion), L hip drain, Rx: bed mob, dynamic sitting bal, recommend coRx w/ PT, sponge bath 6/5/23/gg codes   OT Discharge Recommendation (DC Rec)   (antic need for assist)   OT Brief overview of current status see clinical impressions   Total Session Time   Timed Code Treatment Minutes 45   Total Session Time (sum of timed and untimed services) 60   Post Acute Settings Only   What unit is patient on? TCU   Bed Mobility: Turning side to side/Roll Left and Right   Describe Performance oT: variable, bed performance mod A to roll toward R side, max A toward L side   Bed Mobility: Sit to lying   Describe Performance OT: max A of 2   Bed Mobility: Lying to sitting on the side of bed   Describe Performance oT: max A of 2   Picking up Object   Reason Not Done Safety concerns   Upper Body Dressing   Describe Performance OT: gown change, min A   Lower Body Dressing (Pants/Undergarments)   Reason Not Done Resident refused to perform  (due to pain w/ movement and draingage L hip)   Lower Body Dressing (Putting On/Taking-Off Footwear)   Describe Performance oT: depend   Toileting Hygiene   Describe Performance oT: depend   Transfers: Toilet transfers    Reason Not Done Resident refused to perform  (pain)   Hygiene/Grooming   Describe Performance OT: maxA for thoroughness   Bathing   Describe Performance OT: sponge bath, depend, total ASSist of 2

## 2023-06-03 NOTE — H&P
H&P by Nii Brink MD was reviewed. I agree with assessment and plan as documented.       Mariana Sorto CNP, APRN  Internal Medicine [facility]  Pager [phone]

## 2023-06-04 NOTE — H&P
[facility] Transitional Care Unit  Extended Progress Note  Waldo Bustos  [id number]  June 2, 2023     Assessment and Plan:   Mr. Waldo Bustos is a 71 yo male with hx of T2DM, HLD, chronic neck pain, JAIR, DVT/PE on chronic AC and chronic L hip prosthetic joint infection initially admitted to [facility] 11/22/22 for scheduled explantation of L hip prothesis, debridement and reconstruction with antibiotic spacer. Post-op course c/b profound deconditioning, transferred to  TCU for rehab; however therapies no longer worked with pt after several months of in-bed therapy and refusals to mobilize. Underwent biopsy 4/7 with no e/o infection. Transferred back to [facility] and is now s/p L BELLA reimplantation, 5/26, with post-op course again c/b severe deconditioning, transferred back to [facility]U 6/2 for ongoing rehabilitation.     # S/p L hip explantation of L hip antibiotic spacer and revision L BELLA, 5/26/23: By Dr. Meyers's team. Post-op developed L foot drop. AFO ordered but pt declining to use. OR cultures NGTD. Pain stable. Incision healing well.   - Activity: 50% PWB LLE with posterior hip precautions x 3 months.   - AFO ordered for foot drop but pt continues to decline  - PT/OT  - Keep knee bent in a foam leg elevator to relax the sciatic nerve   - Pain: Schedule Robaxin 500 mg QID and continue prn Tylenol and oxycodone  - Follow up with Dr. Meyers in clinic as scheduled on 6/26.     # Chronic neck pain: Stable. Continue pain regimen as above.     # Ajustment disorder with depressed mood  # Prolong grief disorder  Depressed mood in setting of ongoing health issues. Passive suicidal statements to nursing staff in the past. Psychiatry saw pt on 5/31 in hospital due to lack of motivation to work with therapies, however, pt declined interest in starting any mood altering medications, but more willing to engage with Health Psychology.   - Health psychology was not available to follow  here. Consider reinitiating post discharge when Health Psychology is able to connect with him.     # Diabetes mellitus type 2 (A1c 5.8% 3/6/23): Most recent A1C 5.8% on 3/6/23. PTA meds on hold since surgery given lower sugars post-op save sliding scale.   Recent Labs   Lab 06/04/23  0756 06/03/23  1853 06/03/23  0744 06/02/23  1833 06/02/23  1001 06/02/23  0205   * 120* 136* 109* 147* 121*      - Continue Novolog medium sliding scale  - Continue to hold PTA glipizide, metformin, Actos, and Victoza for now  - Accuchecks TID before meals and at bedtime  - Hypoglycemic protocol.     # Hyperlipidemia: Continue Lipitor 40mg once daily     # Obstructive sleep apnea: Continue nasal CPAP whenever sleeping     # Unprovoked DVT in L common femoral, deep and superficial femoral, popliteal, posterior tibial veins and PE (2018) on chronic anticoagulation  - Continue PTA apixaban 5 mg BID        Diet and/or tube feedings: Regular  Lines, tubes, drains: GUERRERO bulb at surgical site  DVT prophylaxis: DOAC  Indications for psychotropic medications: N/A  Code status discussed on admission: Full Code  Pneumococcal vaccination status: UTD        Nii Brink MD  Hospitalist Service  [facility]  Securely message with [employer] (more info)  Text page via [facility] Paging/Directory            Consults:   PT/OT         History of Present Illness:   Please refer to discharge summarydated 6/2 by Jessica Hodge CNP, for full details. In brief, Mr. 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 Post-op course c/b profound deconditioning, transferred to  TCU for rehab; however therapies no longer worked with pt after several months of in-bed therapy and refusals to mobilize.  Underwent biopsy 4/7 with no e/o infection. Transferred back to [facility] and is now s/p L BELLA reimplantation, 5/26, with post-op course again c/b severe deconditioning, transferred back to  TCU 6/2 for ongoing rehabilitation.    Currently pt admits to stable L hip pain and chronic neck pain. Denies other acute physical concerns at this time including fevers, chills, chest pain, SOB, nausea, abd pain, bowel and bladder concerns.          Physical Exam:   Vitals were reviewed  Blood pressure 112/51, pulse 76, temperature (!) 96.4  F (35.8  C), temperature source Oral, resp. rate 16, SpO2 95 %.  GEN: In NAD  HEENT: NCAT; PERRL; sclerae non-icteric  LUNGS: CTAB  CV: RRR  ABD: +BSs; SNTND  EXT: No BLE edema; L hip incision covered with c/d/i drainage. GUERRERO bulb intact  SKIN: No acute rashes noted on exposed areas.  NEURO: AAOx3; CNs grossly intact; No acute focal deficits noted.           Past Medical History:     Past Medical History:   Diagnosis Date     Arthritis 5 years ago     Chronic osteoarthritis Not sure     Diabetes (H) 10-12 years ago     DVT (deep venous thrombosis) (H)      Dyslipidemia      Gastroesophageal reflux disease      History of blood transfusion      Iliopsoas abscess (H)      Infection of prosthetic hip joint (H)      JAIR (obstructive sleep apnea)      Pulmonary embolism (H)      RA (rheumatoid arthritis) (H) Not sure     Reduced vision 2-3 years ago    Cataract Surgery on both eyes     Venous insufficiency              Past Surgical History:      Past Surgical History:   Procedure Laterality Date     ARTHROPLASTY REVISION HIP Left 5/26/2023    Procedure: Revision Left Total Hip Arthroplasty;  Surgeon: Rom Meyers MD;  Location: [facility] OR     ASPIRATION NEEDLE HIP Left 4/7/2023    Procedure: ARTHROCENTESIS, LEFT HIP;  Surgeon: Rom Meyers MD;  Location: [facility] OR     Cataract       COLONOSCOPY       EGD       IR FINE NEEDLE ASPIRATION W ULTRASOUND  3/6/2023     IR IVC FILTER PLACEMENT       removed     IRRIGATION AND DEBRIDEMENT HIP, PLACE ANTIBIOTIC CEMENT BEADS / SPACER Left 11/22/2022    Procedure: and Reconstruction Using G 20 Prosthetic Antibiotic Cement Spacer;  Surgeon: Rom Meyers MD;  Location: [facility] OR     REMOVE ANTIBIOTIC CEMENT BEADS / SPACER HIP Left 5/26/2023    Procedure: Explantation of Left Hip Antibiotic Spacer;  Surgeon: Rom Meyers MD;  Location: [facility] OR     REMOVE HARDWARE ARTHROPLASTY HIP Left 11/22/2022    Procedure: Explantation of Chronic  Infected Left Total Hip Arthroplasty, Extended Trochanteric Osteotomy with Extensive Debridement;  Surgeon: Rom Meyers MD;  Location: [facility] OR     SYNOVIAL BIOPSY HIP Left 4/7/2023    Procedure: BIOPSY, SYNOVIUM, LEFT  HIP, percutaneous;  Surgeon: Rom Meyers MD;  Location: [facility] OR     [facility] PELVIS/HIP JOINT SURGERY UNLISTED       [facility] STOMACH SURGERY PROCEDURE UNLISTED  1954    2 Hernia surgeries as a child             Family History:     Family History   Adopted: Yes   Problem Relation Age of Onset     Diabetes No family hx of      Rheumatoid Arthritis No family hx of      Low Back Problems No family hx of      Unknown/Adopted No family hx of              Social History:     Social History     Tobacco Use     Smoking status: Never     Smokeless tobacco: Never   Vaping Use     Vaping status: Not on file   Substance Use Topics     Alcohol use: No        Living situation prior to admission: [place], [place]         Medications:   No current facility-administered medications on file prior to encounter.  acetaminophen (TYLENOL) 325 MG tablet, Take 2 tablets (650 mg) by mouth every 4 hours as needed for other (For optimal non-opioid multimodal pain management to improve pain control.)  apixaban ANTICOAGULANT (ELIQUIS) 5 MG tablet, Take 5 mg by mouth 2 times daily  atorvastatin (LIPITOR) 40 MG tablet, Take 40 mg by mouth daily  ibuprofen (ADVIL/MOTRIN) 600 MG tablet, Take 1 tablet (600 mg) by mouth every 6 hours as needed for inflammatory  pain  insulin aspart (NOVOLOG PEN) 100 UNIT/ML pen, Inject 1-7 Units Subcutaneous 3 times daily (before meals)  insulin aspart (NOVOLOG PEN) 100 UNIT/ML pen, Inject 1-5 Units Subcutaneous At Bedtime  lactobacillus rhamnosus, GG, (CULTURELL) capsule, Take 1 capsule by mouth 2 times daily  methocarbamol (ROBAXIN) 500 MG tablet, Take 1 tablet (500 mg) by mouth every 6 hours as needed for muscle spasms  oxyCODONE (ROXICODONE) 5 MG tablet, Take 1 tablet (5 mg) by mouth every 4 hours as needed for moderate pain  polyethylene glycol (MIRALAX) 17 g packet, Take 17 g by mouth daily  senna-docusate (SENOKOT-S/PERICOLACE) 8.6-50 MG tablet, Take 1 tablet by mouth 2 times daily             Allergies:     Allergies   Allergen Reactions     Sulfa Antibiotics      Other reaction(s): *Unknown - Childhood Rxn     Tizanidine Other (See Comments)     Frequent urination; causes drowsiness, and dry mouth               Labs:     [facility]  Recent Labs   Lab 06/04/23  0756 06/03/23  1853 06/03/23  0744 06/02/23  1833 05/31/23  0805 05/31/23  0630 05/30/23  0929 05/30/23  0643 05/29/23  0812 05/29/23  0512   NA  --   --   --   --   --  139  --  138  --  138   POTASSIUM  --   --   --   --   --  4.0  --  4.0  --  4.3   CHLORIDE  --   --   --   --   --  104  --  104  --  104   CO2  --   --   --   --   --  25  --  25  --  25   ANIONGAP  --   --   --   --   --  10  --  9  --  9   * 120* 136* 109*   < > 129*   < > 135*   < > 147*   BUN  --   --   --   --   --  26.0*  --  28.5*  --  33.6*   CR  --   --   --   --   --  0.90  --  0.94  --  1.09   GFRESTIMATED  --   --   --   --   --  >90  --  87  --  73   MAIKOL  --   --   --   --   --  8.2*  --  8.0*  --  7.8*   PROTTOTAL  --   --   --   --   --  5.8*  --   --   --   --    ALBUMIN  --   --   --   --   --  3.2*  --   --   --   --    BILITOTAL  --   --   --   --   --  0.2  --   --   --   --    ALKPHOS  --   --   --   --   --  74  --   --   --   --    AST  --   --   --   --   --  14  --   --   --   --     ALT  --   --   --   --   --  6*  --   --   --   --     < > = values in this interval not displayed.     CBC  Recent Labs   Lab 05/31/23  0630 05/30/23  1532   WBC 8.4 6.3   RBC 2.51* 2.54*   HGB 7.6* 7.9*   HCT 24.5* 23.9*   MCV 98 94   MCH 30.3 31.1   MCHC 31.0* 33.1   RDW 14.0 14.1     --

## 2023-06-04 NOTE — PROGRESS NOTES
"   06/04/23 0702   Appointment Info   Signing Clinician's Name / Credentials (PT) Azeb Quinonez DPT   Rehab Comments (PT) PT: eval complete, tx initiated   Quick Adds   Quick Adds Certification   Living Environment   People in Home significant other   Current Living Arrangements house   Home Accessibility stairs to enter home;stairs within home   Number of Stairs, Main Entrance 2   Stair Railings, Main Entrance none   Number of Stairs, Within Home, Primary six;seven   Stair Railings, Within Home, Primary   (HR on L for descent down one level, daniella HR for stairs going up to bathroom and bedroom.)   Living Environment Comments PT: pt has been in hospital or TCU since late 2022. Prior pt was living in a house with his significant other with multiple levels with stairs.   Self-Care   Usual Activity Tolerance fair   Current Activity Tolerance poor   Regular Exercise No   Equipment Currently Used at Home wheelchair, manual;cane, straight;walker, rolling;wheelchair, power  (power wcs need batteries and are too big for house, pt has mother's old wc)   Fall history within last six months no   Activity/Exercise/Self-Care Comment PT: Pt has been mostly bed bound for last ~ 6 months and has been transferring with a mechanical lift   Post-Acute Assessment Only   Post-Acute Functional Assessment See below   General Information   Onset of Illness/Injury or Date of Surgery 05/26/23   Referring Physician Dr. Nii Brink MD   Patient/Family Therapy Goals Statement (PT) to walk again   Pertinent History of Current Problem (include personal factors and/or comorbidities that impact the POC) per chart review: \" 72 year old male with history of morbid obesity, diabetes, hyperlipidemia, JAIR, PE, DVT, venous insufficiency, osteoarthritis, chronic left hip prosthetic joint infection, recent admission to [facility] on 1120 2/2022 for explantation of left hip prosthesis, debridement, reconstruction with antibiotic spacer, toxic " "encephalopathy, acute hypoxic respiratory failure due to obesity hypoventilation syndrome, acute blood loss anemia, HALEY, physical deconditioning is being admitted to Ortho service status post explantation of left hip antibiotic spacer, revision left total hip arthroplasty on 5/26/2023.  Medicine has been consulted for extensive medical comorbidities.\"   Existing Precautions/Restrictions weight bearing;other (see comments);no hip IR;no hip ADD past midline;90 degree hip flexion  (50% PWB LLE with posterior hip precautions for 3 months  Continue to monitor foot drop, AFO ordered  Keep knee bent in a foam leg elevator to relax the sciatic nerve)   General Observations Pt is awake and able to communicate verbally without concern   Cognition   Affect/Mental Status (Cognition) WFL   Orientation Status (Cognition) oriented x 3  (thought it was Tuesday, not Sunday)   Follows Commands (Cognition) WFL   Pain Assessment   Patient Currently in Pain   (neck pain 8/10 unable to rotate more than a couple inches, drop foot is \"killer painful\", Pain in R LE from mid calf to toes, painful to light touch)   Integumentary/Edema   Integumentary/Edema Comments L hip wound managed by nursing   Range of Motion (ROM)   ROM Comment Decreased ROM in R knee, only able to flex ~45 degrees with PROM, pain in R knee with attempted flexion. Decreased R hip flex PROM. No AROM on L for PF/DF, Decreased L hip, knee, ankle PROM, less that 50% of normal - unable to assess fully due to high pain with movement.   Strength (Manual Muscle Testing)   Strength Comments Pt states unable to activate L glute d/t pain and weakness. Unable to test L d/t pain levels. Pt able to perform R: APs, heel slide and SLR within limited ROM d/t pain.   Transfers   Comment, (Transfers) PT: dependent with ceiling lift A x3 for lift with one person managing L LE   Gait/Stairs (Locomotion)   Comment, (Gait/Stairs) unable   Sensory Examination   Sensory Perception Comments " intact sensation in LE with light touch screen   Clinical Impression   Criteria for Skilled Therapeutic Intervention Yes, treatment indicated   PT Diagnosis (PT) decreased tolerance for all functional activity   Influenced by the following impairments recent L hip surgery, deconditioning, decreased strength, pain, decreased tolerance for functional activity   Functional limitations due to impairments bed mobility, transfers, unsupported sitting, ambulation, stairs.   Clinical Presentation (PT Evaluation Complexity) Unstable/Unpredictable   Clinical Presentation Rationale age, PMH, recent surgery,  ~6 month history of being bed bound, recent hospitilization   Clinical Decision Making (Complexity) high complexity   Planned Therapy Interventions (PT) balance training;bed mobility training;gait training;home exercise program;joint mobilization;lumbar stabilization;motor coordination training;neuromuscular re-education;manual therapy techniques;orthotic fitting/training;patient/family education;postural re-education;ROM (range of motion);stair training;strengthening;stretching;transfer training;wheelchair management/propulsion training;progressive activity/exercise;home program guidelines   Anticipated Equipment Needs at Discharge (PT) other (see comments)  (pt may need wc, will continue to assess)   Risk & Benefits of therapy have been explained evaluation/treatment results reviewed;care plan/treatment goals reviewed;risks/benefits reviewed;current/potential barriers reviewed;participants voiced agreement with care plan;participants included;patient   Clinical Impression Comments Patient is a 72 year old male who presents following hospitalization for reconstruction of L hip.  Pt has significant medical history including infections of L hip after L BELLA. Of note pt is severely deconditioned following ~ 6 months of being in bed following hip surgery in Nov 2022. PT eval revealed decreased endurance, strength and tolerance  for functional activity compared to baseline. Estimated length of stay 4 weeks depending on pt participation and progress. Pt will benefit from skilled  physical therapy to address these deficits and to aide in restoration of PLOF, to reduce risk of rehospitalization and to reduce fall risk.   PT Total Evaluation Time   PT Narcisaal High Complexity Minutes (83594) 75   Therapy Certification   Start of care date 06/04/23   Certification date from 06/04/23   Certification date to 07/03/23   Medical Diagnosis Physical Deconditioning, status post hip spacer placement   Physical Therapy Goals   PT Frequency 6x/week   PT Predicted Duration/Target Date for Goal Attainment 06/30/23   PT Goals Bed Mobility;Transfers;Gait;Wheelchair Mobility;Aerobic Activity   PT: Bed Mobility Independent   PT: Transfers Modified independent;Sit to/from stand;Bed to/from chair;Assistive device;Within precautions   PT: Gait Supervision/stand-by assist;Rolling walker;Within precautions;25 feet   PT: Wheelchair Mobility 50 feet;Caregiver SBA;manual wheelchair   PT: Perform aerobic activity with stable cardiovascular response continuous activity;10 minutes  (foot bike)   Interventions   Interventions Quick Adds Therapeutic Activity;Therapeutic Procedure   Therapeutic Activity   Therapeutic Activities: dynamic activities to improve functional performance Minutes (36895) 15   Treatment Detail/Skilled Intervention PT: Time spent educating pt on deficits due to long bed rest and that it is going to take time and hard work, most likely working through pain to progress. Pt states that he felt like he was cut off from therapy last time he was here prematurely. Discussed insurance wanting to see functional progress in order to continue coverage. Pt voiced understanding but also frustration with how things went last time. See gg codes   PT Discharge Planning   PT Plan PT: A x 2 for EOB sitting. LE exercises.   PT Discharge Recommendation (DC Rec) Long term  care facility;home with assist;home with home care physical therapy   PT Rationale for DC Rec discharge pending pt progress and ability to perform stairs. Pt needs to perform 7 steps in order to return home.   PT Brief overview of current status Dependent for transfers with ceiling lift, max A x 2 for bed mobility   Total Session Time   Timed Code Treatment Minutes 15   Total Session Time (sum of timed and untimed services) 90   Bed Mobility: Turning side to side/Roll Left and Right   Patient Performance Total dependence (helper does ALL of the effort)   Staff Performance Two +person assist (two plus persons physical assist)   Describe Performance PT: did not observe d/t time constraints, per pt report max A x 2 for rolling   Bed Mobility: Sit to lying   Patient Performance Total dependence (helper does ALL of effort)   Staff Performance Two +person assist (two plus persons physical assist)   Describe Performance PT: did not observe d/t time constraints, per pt report max A x 2 for rolling   Bed Mobility: Lying to sitting on the side of bed   Patient Performance Total dependence (helper does ALL of the effort)   Staff Performance Two +person assist (two plus persons physical assist)   Describe Performance PT: did not observe d/t time constraints, per pt report max A x 2 for rolling   Transfers: Sit to Stand   Reason Not Done Medical condition   Transfers: Chair/Bed transfers   Patient Performance Total dependence (helper does ALL of the effort)   Staff Performance Two +person assist (two plus persons physical assist)   Equipment Used   (Ceiling lift)   Describe Performance PT: A x 3 for use of transfer sheet and overhead ceiling lift, one person to control L LE   Ambulation   Walks in room: Reason Not Done Medical condition   Walks in denson:  Reason Not Done Medical condition   Walk 10 Feet   Reason Not Done Medical condition   Walk 10 Feet on uneven surfaces   Reason Not Done Medical condition   Walk 50 Feet with  Two Turns   Reason Not Done Medical condition   Walk 150 Feet   Reason Not Done Medical condition   Locomotion   Move on unit: Reason Not Done Medical condition   Wheel 50 Feet   Reason Not Done Medical condition   Wheel 150 Feet   Reason Not Done Medical condition   1 Step (curb)   Reason Not Done Medical condition   4 Steps   Reason Not Done Medical condition   12 Steps   Reason Not Done Medical condition   Car Transfer   Reason Not Done Medical condition   Picking up Object   Reason Not Done Medical condition

## 2023-06-04 NOTE — PLAN OF CARE
Patient is alert and oriented x4. Able to make needs known to staff. Patient is continent of both bowel and bladder. Transfers assist-2 with liko lift. Patient denied Chest pain, SOB, new onset cough and N&V. Diet: regular diet with fair appetite declined breakfast. Declined full skin check. Dressing to GUERRERO drain site is intact but has blood pooled at site- no leakage through dressing. GUERRERO drain output was 15 mL serous drainage. Pain managed with PRN oxycodone x2 and robaxin which was somewhat effective. Had refused mantoux TB test yesterday but per lab history Quantiferon Gold TB gold test done on 12/29/22.  Call-light within reach. Continue with POC.    Vital signs: T: 96.4,  B/P: 112/51,  P: 76,  R: 16,  SpO2: 95 %   Patient does not have new respiratory symptoms.  Patient does not have new sore throat.  Patient does not have a fever greater than 99.5.

## 2023-06-04 NOTE — PLAN OF CARE
Goal Outcome Evaluation:    FOCUS/GOAL    Bowel management, Bladder management, Medication management, Pain management, Wound care management, Mobility, Skin integrity and Safety management    ASSESSMENT, INTERVENTIONS AND CONTINUING PLAN FOR GOAL:    Pt is A&OX4, calm, & cooperative with care. Denied CP, SOB, & n/v. A of 2 with Liko lift for transfer; was not OOB this shift. Partial weight bearing to the LLE. Pt remains refusing skin assessment. Continent for both B&B; uses bedpan for BM. Urinal at the bedside for bladder. No BM this shift. Takes med whole with thin liquid. Pt did not request for pain med this shift. Dressing to L hip CDI. GUERRERO drain intact with bright red output. No acute issue. Able to make needs known & call light within reach. Will continue with plan of care.    Patient's most recent vital signs are:     Vital signs:  BP: 116/52  Temp: 97.1  HR: 77  RR: 16  SpO2: 96 %     Patient does not have new respiratory symptoms.  Patient does not have new sore throat.  Patient does not have a fever greater than 99.5.

## 2023-06-04 NOTE — PHARMACY-MEDICATION REGIMEN REVIEW
Pharmacy Medication Regimen Review  Waldo Bustos is a 72 year old male who is currently in the Transitional Care Unit.    Assessment: All medications have an appropriate indications, durations and no unnecessary use was found    Plan:   Continue current medications and plan   Attending provider will be sent this note for review.  If there are any emergent issues noted above, pharmacist will contact provider directly by phone.      Pharmacy will periodically review the resident's medication regimen for any PRN medications not administered in > 72 hours and discontinue them. The pharmacist will discuss gradual dose reductions of psychopharmacologic medications with interdisciplinary team on a regular basis.    Please contact pharmacy if the above does not answer specific medication questions/concerns.  Dee Abrams [facility] on 6/4/2023 at 12:39 PM    Background:  A pharmacist has reviewed all medications and pertinent medical history today.  Medications were reviewed for appropriate use and any irregularities found are listed with recommendations.      Current Facility-Administered Medications:      [START ON 6/5/2023] - Skin Test Reading -, , Does not apply, Q21 Days, Bartolome Stanton PA-C     acetaminophen (TYLENOL) Suppository 650 mg, 650 mg, Rectal, Q4H PRN, Bartolome Stanton PA-C     acetaminophen (TYLENOL) tablet 650 mg, 650 mg, Oral, Q4H PRN, Bartolome Stanton PA-C, 650 mg at 06/03/23 2225     apixaban ANTICOAGULANT (ELIQUIS) tablet 5 mg, 5 mg, Oral, BID, Bartolome Stanton PA-C, 5 mg at 06/04/23 0853     atorvastatin (LIPITOR) tablet 40 mg, 40 mg, Oral, Daily, Bartolome Stanton PA-C, 40 mg at 06/04/23 0853     bisacodyl (DULCOLAX) suppository 10 mg, 10 mg, Rectal, Daily PRN, Mariana Sorto, ALEENA     lactobacillus rhamnosus (GG) (CULTURELL) capsule 1 capsule, 1 capsule, Oral, BID, Bartolome Stanton PA-C, 1 capsule at 06/04/23 0853     methocarbamol (ROBAXIN) tablet 500  mg, 500 mg, Oral, Q6H PRN, Bartolome Stanton PA-C, 500 mg at 06/04/23 0853     naloxone (NARCAN) injection 0.2 mg, 0.2 mg, Intravenous, Q2 Min PRN **OR** naloxone (NARCAN) injection 0.4 mg, 0.4 mg, Intravenous, Q2 Min PRN **OR** naloxone (NARCAN) injection 0.2 mg, 0.2 mg, Intramuscular, Q2 Min PRN **OR** naloxone (NARCAN) injection 0.4 mg, 0.4 mg, Intramuscular, Q2 Min PRN, Nii Brink MD     Nurse may request from Pharmacy a change of form of medication (e.g. Liquid to tablet)., , Does not apply, Continuous PRN, Bartolome Stanton PA-C     oxyCODONE (ROXICODONE) tablet 5-10 mg, 5-10 mg, Oral, Q4H PRN, Bartolome Stanton PA-C, 10 mg at 06/04/23 0853     Patient is already receiving anticoagulation with heparin, enoxaparin (LOVENOX), warfarin (COUMADIN)  or other anticoagulant medication, , Does not apply, Continuous PRN, Bartolome Stanton PA-C     polyethylene glycol (MIRALAX) Packet 17 g, 17 g, Oral, Daily, Bartolome Stanton PA-C, 17 g at 06/04/23 0853     senna-docusate (SENOKOT-S/PERICOLACE) 8.6-50 MG per tablet 1 tablet, 1 tablet, Oral, BID, Bartolome Stanton PA-C, 1 tablet at 06/04/23 0853     tuberculin injection 5 Units, 5 Units, Intradermal, Q21 Days, Bartolome Stanton PA-C  No current outpatient prescriptions on file.

## 2023-06-04 NOTE — PLAN OF CARE
Goal Outcome Evaluation:    FOCUS/GOAL    Bowel management, Bladder management, Medication management, Pain management, Wound care management, Mobility, Skin integrity and Safety management    ASSESSMENT, INTERVENTIONS AND CONTINUING PLAN FOR GOAL:    Pt is A&OX4, calm, & cooperative with care. Denied CP, SOB, & n/v. VSS & on RA. A of 2 with Liko lift for transfer; was not OOB this shift. Partial weight bearing to the LLE. Pt remains refusing skin assessment. Continent for both B&B; uses bedpan for BM. Urinal at the bedside for bladder. No BM since before surgery. Takes med whole with thin liquid. Managed pain with PRN tylenol, oxycodone X2, & robaxin. Dressing to L hip CDI. GUERRERO drain intact with bright red output. Pt ate dinner from [employer] trip with good appetite & no acute issue. Able to make needs known & call light within reach. Will continue with POC.    Patient's most recent vital signs are:     Vital signs:  BP: 116/52  Temp: 97.1  HR: 77  RR: 16  SpO2: 96 %     Patient does not have new respiratory symptoms.  Patient does not have new sore throat.  Patient does not have a fever greater than 99.5.

## 2023-06-04 NOTE — PHARMACY-TCU REVIEW OF H&P
I have reviewed this patient's TCU admission History & Physical for medication related changes/recommendations identified by the admitting provider.  I am confirming that there are no recommendations requiring changes to medication orders are indicated at this time based on the provider recommendations in the H&P.    Dee Abrams RPH on 6/4/2023 at 12:40 PM

## 2023-06-05 NOTE — PLAN OF CARE
Goal Outcome Evaluation:         [id number]  Pt alert and oriented x4, able to make needs known. Continent of bowel and bladder. Liko lift for transfers. No c/o chest pain. SOB, N/V. Given PRN tylenol and oxycodone x2, and robaxin x1 for hip pain. Participated in therapy. GUERRERO drain intact. Transferred in recliner by therapy.       [id number]  Pt was in the recliner at start of shift, back to bed after dinner. PRN Tylenol and oxycodone given x1, for hip pain.VSS. Pleasant and cooperative with cares. Will continue with POC.         Patient's most recent vital signs are:     Vital signs:  BP: 133/52  Temp: 96.9  HR: 77  RR: 16  SpO2: 93 %     Patient does not have new respiratory symptoms.  Patient does not have new sore throat.  Patient does not have a fever greater than 99.5.

## 2023-06-05 NOTE — PROGRESS NOTES
"Weekend SW had this to say about pt but didn't put a note in the record.  ZEB cut and paste note and put in the record.  \"Rahat declined to complete PHQ9 after the first question. He expressed feeling frustrated with services he s receiving here. I asked him if he s able to talk to someone if he had thoughts of hurting himself. Rahat reported he would talk to his girlfriend. He also named 2 buddies and a few of his brothers that lives locally that are supportive. Rahat reported he s not interested in mental health services and that he s not looking to hurt himself. He really wants to improve walking and mobility. He would love to go home. He stated you were helping him get on MA but [facility] has been slow. \"  -Jessica Weekend Float ZEB Moreira, [facility] Transitional [facility] Unit   Social Work   [address] [place] [address]  () [phone]        "

## 2023-06-05 NOTE — PLAN OF CARE
Goal Outcome Evaluation:    Alert and oriented x4. Denied sob and cp. Able to make needs known. On regular diet. Continent both bladder and bowel. Liko lift for transfers. Refused to use foam wedge leg elevator. Partial weight bearing status on LLE maintained. GUERRERO drain on left hip intact, drained 5 ml sanguinous output. Still no BM noted during shift. Awake during safety rounds, comfortable in bed watching TV. Call light within reach. Continue with the plan of care.      Patient's most recent vital signs are:     Vital signs:  BP: 123/53  Temp: 96.8  HR: 73  RR: 18  SpO2: 96 %     Patient does not have new respiratory symptoms.  Patient does not have new sore throat.  Patient does not have a fever greater than 99.5.

## 2023-06-06 NOTE — PROGRESS NOTES
06/06/23 1000   Name of Certified Therapeutic Rec Specialist   Name of Certified Therapeutic Rec Specialist KUMAR Riggs   Appointment Type   Type of Therapeutic Rec Session Therapeutic Rec Assessment   General Information   Patient Profile Review See Profile for full history and prior level of function   Daily Contact with Relatives or Friends Phone call;Visit   Pets Other (see comments)  (loves dogs)   Community Involvement   Community Involvement Disabled   Spiritual Practice Not connected/interested   Sees [facility] ? No   Outings Other (comments)  (weekly junches with friends)   Music   Music Preferences Country;Rock   Listens to Recorded Music Yes   Brought Own Equipment Yes   Media   Computer Has laptop here   TV / Movies TV   Sports / Physical Activities   Outdoor Activities Lawn / yard care   Sports Fan Baseball;Basketball;Football;Hockey   Impression   Open to Socializing with Others Independent   Barriers to Leisure Mobility   Patient, family and / or staff in agreement with Plan of Care Yes   Treatment Plan   Interested in [place]? No   Type of Intervention Independent with activity   Equipment and Supplies While on Unit None needed   Assessment Re assessment completed, pt has returned from acute hospitalization ([facility]). Pt was re oriented to role of rec therapy, no leisure needs at this time, Is interested in seing dogs for pet visits and visits from volunteer, which will be scheduled. Will monitor pt while on unit.

## 2023-06-06 NOTE — PROGRESS NOTES
06/06/23 1200   Appointment Info   Signing Clinician's Name / Credentials (PT) alicia Derikdanielle   PT Assistant Visit Number 2   Therapeutic Procedure/Exercise   Ther. Procedure: strength, endurance, ROM, flexibillity Minutes (18846) 10   Treatment Detail/Skilled Intervention PT pt supine at start of PT session, pt needing some V.c for tech and assist on how to self check mm contractions. pt needing assist for heel sl, due to weakness and pain.   Therapeutic Activity   Therapeutic Activities: dynamic activities to improve functional performance Minutes (36567) 45   Symptoms Noted During/After Treatment Dizziness;Fatigue;Increased pain   Treatment Detail/Skilled Intervention PT PT session co-tx with OT due to needing assist x 2 for bed mob and assist 50% of the time for sitting balance. pt needing slow one step movment with V.c to get to EOB. pt able to sit up to 12 min at EOB. pt return to supine with mod A x 2 with OH lift with sheet to power recliner. time needed for L leg postions and set up in recliner. donned Kelvin support boot with lateral support of increased time in neutral LE postions. all other needs met.   PT Discharge Planning   PT Plan PT A x 2 with sheet to recliner. bring to PT gym try standing frame.   PT Discharge Recommendation (DC Rec) Long term care facility;home with assist;home with home care physical therapy   PT Rationale for DC Rec discharge pending pt progress and ability to perform stairs. Pt needs to perform 7 steps in order to return home.   PT Brief overview of current status Dependent for transfers with ceiling lift, max A x 2 for bed mobility   Total Session Time   Timed Code Treatment Minutes 55   Total Session Time (sum of timed and untimed services) 55   Post Acute Settings Only   What unit is patient on? TCU     PT: pt working well with PT staff past two days. Pt ed on cont progress with PT and willing to cont to work hard each day. Pt ed on next days PT goals. Pt agreeable to cont  progress with PT to use of standing frame tomorrow Thursday.

## 2023-06-06 NOTE — PROGRESS NOTES
"entry written by REINIER Stover 6/5/23 06/05/23 1120   Appointment Info   Signing Clinician's Name / Credentials (OT) REINIER Stover   OT Assistant Visit Number 1   Self-Care/Home Management   Self-Care/Home Mgmt/ADL, Compensatory, Meal Prep Minutes (22750) 20   Treatment Detail/Skilled Intervention BRII: Pt greeted in supine position. Writer familiar w/ patient from previous admission. HOB slightly elevated, hips in approx 20* flexion. Discussed plan for shower vs sponge bathing. Pt states no shower chair we have is comfortable and declines sponge bathing. Pt reports increased pain in cervical neck area and LLE (agitated responses). When asked if patient has had pain medications, pt pointed at tray table and stated, \"there they are\". Writer then observed pt take medications. Pt educated on importance of taking medications prior to therapy which has been discussed before. Re-inforced agreed upon POC from Luz w/ DASHA/L. Pt asking writer what is going to be done about L drop foot. Writer educated pt on AFO and PRAFO boots which pt reports he is aware of (need to confirm if ordered from PT) Writer able to allow for pain medications to set in and returned 30 minutes later per pt request. Upon return, pt reports nothing has changed. Pt takes substantial time to respond to writer's questions/comments. Pt then simply states, \"I don't know what to tell you\". Pt allows writer to trial moving LLE w/ writer telling patient \"step by step what is being done\". Pt allows writer to passively flex L knee approx 5* until requesting to discontinue d/t pain. Pt refuses all mobility d/t pain.   OT Discharge Planning   OT Plan OT: LLE 50% WB, posterior hip precautions(no hip ADD past midline; 90 degree hip flexion), L hip drain, Rx: bed mob, dynamic sitting bal, recommend coRx w/ PT   OT Brief overview of current status OT: See section above.   Total Session Time   Timed Code Treatment Minutes 20   Total Session Time (sum of " timed and untimed services) 20   Post Acute Settings Only   What unit is patient on? TCU   Bathing   Reason Not Done Resident refused to perform

## 2023-06-06 NOTE — PLAN OF CARE
Goal Outcome Evaluation:    Patient is alert and oriented x4. No reports of sob and cp. GUERRERO drain on left hip intact. Refused to use foam wedge leg elevator and skin assessment. Patient is on partial weight bearing status with posterior hip precaution. Still no BM during the shift. Offered suppository for constipation but patient also refused. Sleeping well during safety rounds.       Patient's most recent vital signs are:     Vital signs:  BP: 133/52  Temp: 96.9  HR: 77  RR: 16  SpO2: 93 %     Patient does not have new respiratory symptoms.  Patient does not have new sore throat.  Patient does not have a fever greater than 99.5.

## 2023-06-06 NOTE — CARE PLAN
Care Plan created. Bedside RN to assess on progression and edit as needed.     Problem: Pain Acute  Goal: Optimal Pain Control and Function  Description: Evaluate pain level, effect of treatment and patient response at regular intervals.    Minimize pain stimuli; coordinate care and adjust environment (e.g., light, noise, unnecessary movement); promote sleep/rest.    Match pharmacologic analgesia to severity and type of pain mechanism (e.g., neuropathic, muscle, inflammatory); consider multimodal approach (e.g., nonopioid, opioid, adjuvant).    Provide medication at regular intervals; titrate to patient response.    Manage breakthrough pain with additional doses; consider rotation or switching medication.      Problem: Depressive Signs/Symptoms  Goal: Optimized Energy Level (Depressive Signs/Symptoms)    Problem: Skin Injury Risk Increased  Goal: Skin Health and Integrity  Description: Perform a nutrition assessment that includes a nutrition-focused physical exam; identify malnutrition risk.    Assess for adequate oral intake; if inadequate, offer oral supplemental food or drinks to enhance calorie and protein intake.    Assess for vitamin and mineral deficiencies; supplement if depleted.    Assess need and assist with meal set-up and feeding.  Adjust diet or meal schedule based on preferences and tolerance.    Minimize unnecessary dietary restrictions to increase oral intake.    Problem: Range of Motion Impairment  Goal: Optimal Range of Motion  Description: Assess joint range of motion using a consistent standardized procedure (e.g., goniometric measurement, smart phone meka) or functional screen to determine individual limitations or risk for impairment.    Identify limitations that contribute to range of motion deficit, such as stiffness, abnormal joint-end feel or muscle tone; determine presence of contraindication or precaution for therapy (e.g., inflammation, instability, infection).    Design and implement  multicomponent, graded interventions to address impairment (e.g., active-assistive and active exercise, joint mobilization, functional activity, passive stretching).    Assist in determining optimal schedule for range of motion program.    Note and address unexpected pain or difficulty with movement.  Incorporate range of motion into functional tasks, such as mobility, self-care, standing and position changes.    Implement individualized positioning techniques; incorporate frequent position changes.    Consider encouraging motor imagery or visualization of movement.    Problem: Hip Arthroplasty  Goal: Optimal Functional Ability    Problem: Malnutrition  Goal: Improved Nutritional Intake  Outcome: Progressing     Problem: BADL (Basic Activities of Daily Living) Impairment  Goal: Optimal Safe BADL Performance  Description: Assess BADL (basic activity of daily living) abilities; encourage participation at maximally safe independent level.    Provide assistance and supervision needed to maintain safety; involve caregiver in BADL (basic activity of daily living) training.    Ensure effective use of equipment or devices, such as a long-handled reacher, shower seat or orthosis.    Ensure proper body mechanics and positioning for optimal task performance.    Provide set-up of items if patient is unable to retrieve; store personal care items in accessible location.    Schedule BADL (basic activity of daily living) activities when pain and fatigue are at a minimum; pace activity to conserve energy.

## 2023-06-06 NOTE — PROGRESS NOTES
Pt alert and oriented X4. Able to make needs known. Denied SOB, CP, N/V. L hip dressing intact and bulb to suction. Refused to elevate leg on foam pillow. Boot on at all times. Refused repositioning and to allow staff to change soiled transfer sling. Educated on risk of skin breakdown.         Patient's most recent vital signs are:     Vital signs:  BP: 142/58  Temp: 97.9  HR: 76  RR: 16  SpO2: 96 %     Patient does not have new respiratory symptoms.  Patient does not have new sore throat.  Patient does not have a fever greater than 99.5.

## 2023-06-07 NOTE — PLAN OF CARE
Goal Outcome Evaluation:    Patient is alert and oriented x4. Able to make needs known. Left hip GUERRERO drain intact. On partial weight bearing status with posterior hip precaution. Refused foam wedge leg elevator. No PRN medications given. Slept well overnight. No acute issue during shift. Call light within reach. Continue with the plan of care.      Patient's most recent vital signs are:     Vital signs:  BP: 128/54  Temp: 96.6  HR: 69  RR: 16  SpO2: 98 %     Patient does not have new respiratory symptoms.  Patient does not have new sore throat.  Patient does not have a fever greater than 99.5.

## 2023-06-07 NOTE — PROGRESS NOTES
"Behavior plan meeting    SW, RN, PT, OT and pt met.  We talked about a plan going forward to make this round of therapy be successful.  -Nursing staff will just schedule time 3 x a day to put pt in chair and take pt out of chair.  2 hrs at a time.  Pt stayed in it too long one day and hurt for the rest of the day.  We only want 2 hrs x3 daily.  -It might be helpful to have therapies same time every day so there is a routine and pt knows, to get cleaned up and pain meds so therapy can happen the whole season.  It's not cut short due to pain or cleaning pt up.   -Boots need to be on and pt should try and move legs as able to pt can move them better when up and with therapy.  -pt must let nursing staff clean pt up and look at skin.  If there is going to be skin breakdown that would delay therapy.    -Nursing will be told step by step how to roll and transfer pt. Pt stated most staff \"jerk\" pt and it hurts. RN will write up step by step and tell all staff how to help pt roll and transfer.   -PT will work this week on standing pt.  If no standing then therapies might not be successful down the road.    Pt agreed to all the above.  Staff said if pt was successful, then pt could go home.  If pt doesn't help with all the above, then pt might not be able to go back home. Pt states wants to go home but hasn't so far done anything about trying to get home. This plan will be typed up and given to pt.     NEREYDA Sharp   [facility], Transitional Care Unit   Social Work   [facility] [place] [address]  (PH) [phone]    "

## 2023-06-07 NOTE — PROGRESS NOTES
[facility]  Internal Medicine Progress Note    TCU Day # 5    Assessment & Plan:   Waldo Bustos is a 72 year old male with past medical history significant for type 2 diabetes, hyperlipidemia, chronic back pain, JAIR, DVT/PE on DOAC, and chronic left hip prosthetic joint infection initially admitted to [facility] on 11/22/2022 for scheduled explant of left hip prosthesis, debridement, and reconstruction with antibiotic spacer.  Postop course complicated by profound deconditioning.  He was admitted to TCU initially on 12/23/2022 for physical rehabilitation, however therapies no longer worked with patient after several months of an in bed therapy and his refusals to mobilize.  Underwent biopsy on 4/7/2023 without evidence of infection.  He was transferred back to [facility] for left total hip arthroplasty and reimplantation on 5/26/23 with Dr. Meyers.  Again, postop course complicated by severe deconditioning.  He was readmitted to TCU on 6/2/2023 for ongoing rehabilitation.    # S/p left hip explantation of left hip antibiotic spacer and revision of left total hip arthroplasty (5/26/2023)- surgery by Dr. Rahman's team.  Developed left foot drop postoperatively.  AFOs ordered but patient has been declining to use these.  OR cultures no growth to date.  Pain has overall been well controlled.  Per discussion with nursing, bandage appears to be saturated.  - Activity: 50% PWB LLE with posterior hip precautions x3 months  - AFO ordered for foot drop  - PT, OT consults  - Knee should be kept event in a foam leg elevator to relax sciatic nerve  Pain: Schedule Robaxin 500 mg 4 times a day; continue as needed Tylenol and as needed oxycodone  - Follow-up with Dr. Meyers in clinic as scheduled on 6/26/2023  - Will discuss with Ortho [facility] regarding dressing changes to surgical wound    # Adjustment disorder with depressed mood  # Prolonged grief disorder  Patient has been with depressed  mood in setting of ongoing health issues, as evidenced by lack of motivation to work with therapies during both TCU admissions.  Per chart review he has made passive suicidal statements to nursing in the past.  Psychiatry saw patient during hospital admission on 5/31 due to lack of motivation to work with therapies postoperatively, patient declined interest in starting any psychiatric medications at this time.  Was willing to engage with health psychology.  Talk with patient today, he shared that he and his significant other have experience a lot of loss and stress over the last couple of years, in addition to his medical and orthopedic issues.  - Will have ongoing conversation with patient about possibly starting an antidepressant or stimulant  - Per chart review health psychology has not been available to follow here, will put in referral for outpatient follow-up and continuity of care    # Type 2 diabetes - last hemoglobin A1c 5.8% on 3/6/2023.  On glipizide, metformin, Actos, and Victoza prior to hospitalizations and surgery.  Has been maintained solely on sliding scale insulin since then.  - Continue medium sliding scale with NovoLog  - Continue holding PTA medications for now  - Accu-Cheks 3 times daily before meals and at bedtime  - Hypoglycemia protocol in place    # Hyperlipidemia - Continue Lipitor 40 mg daily    #JAIR - Continue nasal CPAP whenever sleeping    #Unprovoked DVT in left common femoral, deep and superficial femoral, popliteal, and posterior tibial veins, and pulmonary embolism - Diagnosed in 2018.  Continue PTA apixaban 5 mg twice daily.    # Constipation - patient reports not having a bowel movement since his surgery.  He denies abdominal pain.  - Added Colace twice daily today  - Continue senna and MiraLAX as currently ordered  - Check magnesium level in a.m.  - Continue to encourage oral intake and hydration      Consulting Services: PT and OT      CODE: Full  DVT: DOAC  Diet:  Regular  Indications for Psychotropic Medications: N/A  Disposition: Pending therapies      Mariana Sorto, CNP, APRN  Internal Medicine [facility] Hospitalist  [facility]  Pager [phone]    ________________________________________________________________    Subjective & Interval History:      Rahat is seen in his room.  He is sitting in the bedside chair.  Denies any acute medical issues other than ongoing postoperative pain.  Has a low appetite.  Reports constipation, says that he has not had a bowel movement since before surgery.  He shared he and his significant other have experienced a lot of loss in the last couple years, in addition to her health problems and his orthopedic issues with his hip.      Last 24 hour care team notes reviewed.   ROS: 4 point ROS (including Respiratory, CV, GI and ) was performed and negative unless otherwise noted in HPI.     Medications: Reviewed in EPIC.    Physical Exam:    Blood pressure 126/46, pulse 68, temperature 97.5  F (36.4  C), temperature source Oral, resp. rate 16, weight 117.4 kg (258 lb 13.1 oz), SpO2 95 %.    GENERAL: Alert and oriented x 3. Well nourished, well developed.  No acute distress.    HEENT: Normocephalic, atraumatic. Anicteric sclera. Mucous membranes moist.   CV: RRR. S1, S2. No murmurs appreciated.   RESPIRATORY: Effort normal on room air. Lungs CTAB with no wheezing, rales, or rhonchi.   GI: Abdomen soft and non distended, bowel sounds present x all 4 quadrants. No tenderness, rebound, or guarding.   NEUROLOGICAL: No focal deficits. Follows commands.    MUSCULOSKELETAL: No joint swelling or tenderness. Moves all extremities.   EXTREMITIES: No gross deformities.  Trace peripheral edema in bilateral legs.   SKIN: Grossly warm, dry, and intact. No jaundice. No rashes.         ROUTINE IP LABS (Last four results)  CMP   Recent Labs   Lab 06/07/23  0821 06/06/23  1735 06/06/23  0808 06/05/23  1704   * 168* 128* 112*     CBC No lab results  found in last 7 days.  INR No lab results found in last 7 days.

## 2023-06-07 NOTE — PLAN OF CARE
OT: Behavioral Plan     -Up in chair for total of 6 hours each day in 2 hour increments (utilize phone alarms and/or nursing for reminders PRN)  -Allow nursing cares to maintain skin integrity and be ready for therapy (OT/PT can train nursing PRN for rolling to limit pain)  -Set schedule to accommodate pain medications  -Complete HEP on own in room each day  -Wear daniella boots for drop foot and joint integrity

## 2023-06-07 NOTE — PLAN OF CARE
Goal Outcome Evaluation:         Pt alert and oriented x4, able to make needs known. No c/o chest pain, SOB, N/V. Given PRN tylenol, oxycodone and robaxin x1 for hip and neck pain. Placed call light within reached. Will continue with POC. Refused to wear leg wedge.           Patient's most recent vital signs are:     Vital signs:  BP: 128/54  Temp: 96.6  HR: 69  RR: 16  SpO2: 98 %     Patient does not have new respiratory symptoms.  Patient does not have new sore throat.  Patient does not have a fever greater than 99.5.

## 2023-06-07 NOTE — PLAN OF CARE
Goal Outcome Evaluation:  VSS. Uncooperative- Declined full skin assessment.  Last BM 5/27- offered suppository but declined  Informed NP Mariana regarding BM and refusal of Dulcolax supp. Also informed NP Mariana regarding pts L hip incision dressing covered with serosang drainage approx 75% (seems like serosang drainage is coming from GUERRERO site). Stripped GUERRERO x1 with 15 mls serosanguinous output Verbalized generalized pain- Oxycodone, Tylenol and Robaxin given per pts request with some relief. Transferred by therapy to recliner. Now sitting at the recliner.  Will continue plan of care.  Vital signs:  Temp: 97.5  F (36.4  C) Temp src: Oral BP: 111/55 Pulse: 70   Resp: 18 SpO2: 96 % O2 Device: None (Room air)

## 2023-06-07 NOTE — CARE PLAN
Care plan created. Bedside RN to assess on progression and edit as needed.       Problem: Pain Acute  Goal: Optimal Pain Control and Function  Description: Evaluate pain level, effect of treatment and patient response at regular intervals.    Minimize pain stimuli; coordinate care and adjust environment (e.g., light, noise, unnecessary movement); promote sleep/rest.    Match pharmacologic analgesia to severity and type of pain mechanism (e.g., neuropathic, muscle, inflammatory); consider multimodal approach (e.g., nonopioid, opioid, adjuvant).    Provide medication at regular intervals; titrate to patient response.    Manage breakthrough pain with additional doses; consider rotation or switching medication.        Problem: Depressive Signs/Symptoms  Goal: Improved Mood Symptoms (Depressive Signs/Symptoms)    Problem: Skin Injury Risk Increased  Goal: Skin Health and Integrity  Description: Perform a nutrition assessment that includes a nutrition-focused physical exam; identify malnutrition risk.    Assess for adequate oral intake; if inadequate, offer oral supplemental food or drinks to enhance calorie and protein intake.    Assess for vitamin and mineral deficiencies; supplement if depleted.    Assess need and assist with meal set-up and feeding.  Adjust diet or meal schedule based on preferences and tolerance.    Minimize unnecessary dietary restrictions to increase oral intake.      Problem: Range of Motion Impairment  Goal: Optimal Range of Motion  Description: Assess joint range of motion using a consistent standardized procedure (e.g., goniometric measurement, smart phone meka) or functional screen to determine individual limitations or risk for impairment.    Identify limitations that contribute to range of motion deficit, such as stiffness, abnormal joint-end feel or muscle tone; determine presence of contraindication or precaution for therapy (e.g., inflammation, instability, infection).    Design and  implement multicomponent, graded interventions to address impairment (e.g., active-assistive and active exercise, joint mobilization, functional activity, passive stretching).    Assist in determining optimal schedule for range of motion program.    Note and address unexpected pain or difficulty with movement.  Incorporate range of motion into functional tasks, such as mobility, self-care, standing and position changes.    Implement individualized positioning techniques; incorporate frequent position changes.    Consider encouraging motor imagery or visualization of movement.    Problem: Hip Arthroplasty  Goal: Optimal Coping    Problem: Malnutrition  Goal: Improved Nutritional Intake    Problem: BADL (Basic Activities of Daily Living) Impairment  Goal: Optimal Safe BADL Performance  Description: Assess BADL (basic activity of daily living) abilities; encourage participation at maximally safe independent level.    Provide assistance and supervision needed to maintain safety; involve caregiver in BADL (basic activity of daily living) training.    Ensure effective use of equipment or devices, such as a long-handled reacher, shower seat or orthosis.    Ensure proper body mechanics and positioning for optimal task performance.    Provide set-up of items if patient is unable to retrieve; store personal care items in accessible location.    Schedule BADL (basic activity of daily living) activities when pain and fatigue are at a minimum; pace activity to conserve energy.

## 2023-06-08 NOTE — PROGRESS NOTES
06/08/23 7481   Appointment Info   Signing Clinician's Name / Credentials (OT) Aga Reddy,OTR/L CBIS   Rehab Comments (OT) CoRx OT/PT   Therapeutic Activities   Therapeutic Activity Minutes (17359) 50   Treatment Detail/Skilled Intervention OT: coRx PT/OT, time spent explaining proposed plan to pt for treatment and involved pt in discussion re: approach w/ plan bed to recliner to standing frame. pt agreed, time spent problem solving OT/PT skills and pt together w/ more affective and least painful approach to positioning pt in recliner and standing frame sling placed unter pt's buttocks/hips, used slow approach and pt directable to stay on slow steady pace minimizing delay in movment, successful in getting pt rolling in bed and mech lift between bed and recliner, th transported pt to therapy gym via recliner and positioned standing frame sling while in gym, pt demo improvment w/ wt shifting and kolby for daniella Knees in flex position but unable to get pt positioned close enough to padds on standing frame w/ or w/out pillow placed due to pt's c/o pain in L lateraral thigh along incision and knee pain. so did not get pt in standing position. discussed the successes w/ pt and alternate plan to continue coRx but try tilt table .   OT Discharge Planning   OT Plan OT: LLE 50% WB, posterior hip precautions(no hip ADD past midline; 90 degree hip flexion), L hip drain, Rx: bed mob, dynamic sitting bal, recommend coRx w/ PT (working on mob and plan to alter approach to standing w/trial use of tilt table due to unsuccessful on multiple attempts to place in standing frame)   OT Brief overview of current status oT: pt improving w/rolling,  tolerance for wt shifting in recliner, kolby L knee flex and less time required to achieve repositioning but unsuccessful at achieving upright positioning in standing frame. recommend OT /PT coRx to trial standing frame and antic need to prob solve approach to maintain 50% wt bearing LLE.    Total Session Time   Timed Code Treatment Minutes 50   Total Session Time (sum of timed and untimed services) 50   Post Acute Settings Only   What unit is patient on? TCU

## 2023-06-08 NOTE — PLAN OF CARE
Goal Outcome Evaluation:    FOCUS/GOAL    Bowel management, Bladder management, Medication management, Pain management, Wound care management, Mobility, Skin integrity and Safety management    ASSESSMENT, INTERVENTIONS AND CONTINUING PLAN FOR GOAL:    Pt is A&OX4, calm, & cooperative with care. Denied CP, SOB, & n/v. A of 2 with Liko lift for transfer; was not OOB this shift. AFO boots on to BLE d/t foot drop. Continent for both B&B; uses bedpan for BM. Urinal at the bedside for bladder. No BM this shift. Takes med whole with thin liquid. Pt did not request for pain med this shift. Dressing to L. Blood pulled to the GUERRERO drain exit point under the Aquacel dressing. GUERRERO drain intact with bright red output. No acute issue. Able to make needs known & call light within reach. Will continue with POC.    Patient's most recent vital signs are:     Vital signs:  BP: 111/55  Temp: 97.5  HR: 70  RR: 18  SpO2: 96 %     Patient does not have new respiratory symptoms.  Patient does not have new sore throat.  Patient does not have a fever greater than 99.5.

## 2023-06-08 NOTE — PLAN OF CARE
"  Problem: Hip Arthroplasty  Goal: Optimal Functional Ability  Intervention: Promote Optimal Functional Status  Recent Flowsheet Documentation  Taken 6/8/2023 1027 by Jewell Lynn RN  Assistive Device Utilized: lift device  Activity Management: activity adjusted per tolerance     Problem: Pain Acute  Goal: Optimal Pain Control and Function  Description: Evaluate pain level, effect of treatment and patient response at regular intervals.    Minimize pain stimuli; coordinate care and adjust environment (e.g., light, noise, unnecessary movement); promote sleep/rest.    Match pharmacologic analgesia to severity and type of pain mechanism (e.g., neuropathic, muscle, inflammatory); consider multimodal approach (e.g., nonopioid, opioid, adjuvant).    Provide medication at regular intervals; titrate to patient response.    Manage breakthrough pain with additional doses; consider rotation or switching medication.    Intervention: Prevent or Manage Pain  Recent Flowsheet Documentation  Taken 6/8/2023 1027 by Jewell Lynn, RN  Medication Review/Management: medications reviewed   Goal Outcome Evaluation:  Patient premedicated with Robaxin, Tylenol and Oxycodone 30 minutes before therapy starts. GUERRERO drain sites leaking and bulb suction tube has no drainage .GUERRERO Tubings strips and change the suction bulb, serosanguinous drainage is starting to come out.Patient hesitant and do not let writer  touch the incision site with old drainage on the dressing stating \" Do not touch my incision it hurts.\". Worked with therapy, up on his chair sitting.Patient is complaining that he wanted to remove the sling underneath him.3 Staff tried to remove it and talking to him to help out with turning but patient did not even try to grab the bed rails.Wanted to push staff to do more but staffs cannot move him anymore d/t patient is already too close to the edge of the bed.Put on his Prafo boots. Asked the provider to have an order for a Bariatric " bed.Can make needs known. Call light with in reach. Continue with current plan of care.    30 ml of serosanguineous drainage.     Blood pressure 127/56, pulse 64, temperature 96.8  F (36  C), temperature source Oral, resp. rate 16, weight 117.4 kg (258 lb 13.1 oz), SpO2 96 %.

## 2023-06-08 NOTE — PLAN OF CARE
Goal Outcome Evaluation:         A and O x4. Was sitting in recliner at the start of this shift.. Pt refused skin assessment. Also refused suppository d/t last BM being on 5/27.GUERRERO is empty and ha no output this shift. Requested Tylenol, Oxycodone, and Robaxin x1 for left hip pain. No c/o chest pain or shortness of birth.  Currently resting in bed. Call light is in reach. Continue POC.        Patient's most recent vital signs are:     Vital signs:  BP: 111/55  Temp: 97.5  HR: 70  RR: 18  SpO2: 96 %     Patient does not have new respiratory symptoms.  Patient does not have new sore throat.  Patient does not have a fever greater than 99.5.

## 2023-06-09 NOTE — PLAN OF CARE
Goal Outcome Evaluation:      Plan of Care Reviewed With: patient    Overall Patient Progress: no change    Outcome Evaluation: Patient continues with poor appetite and intakes. Encouraged pt to eat at least two meals daily and use outside food as needed. WIll continue to monitor intakes.

## 2023-06-09 NOTE — PLAN OF CARE
Goal Outcome Evaluation:    No acute changes or concerns during NOC shift. Patient denies pain, slept well throughout the shift. Continue with POC.      /56 (BP Location: Right arm)   Pulse 64   Temp 96.8  F (36  C) (Oral)   Resp 16   Wt 117.4 kg (258 lb 13.1 oz)   SpO2 96%   BMI 37.14 kg/m

## 2023-06-09 NOTE — PLAN OF CARE
Goal Outcome Evaluation:  Patient new bed came but patient refused to transfer to recliner so that he could transfer to his new bed.Wanted to do it tomorrow if he will be on his recliner.

## 2023-06-09 NOTE — PROGRESS NOTES
"   06/09/23 1103   Appointment Info   Signing Clinician's Name / Credentials (PT) Azeb Quinonez DPT   Rehab Comments (PT) PT: co treat OT/PT   Therapeutic Activity   Therapeutic Activities: dynamic activities to improve functional performance Minutes (04004) 70   Treatment Detail/Skilled Intervention PT: co treat with OT, Goal of session was to get pt weight bearing on tilt table. Pt is lying on transfer mat sheet. Trouble shooting for whole process throughout session. Bed flattened pillow placed under L calf to slightly flex knee in order to alleviate pain with knee straight. First 5 straps from head to hips in and pt lifted ~1\" off bed in order to move upper body towards tilt table, pt able to move R LE to right to get to tilt table, L LE lifted by lifting pillow to move leg towards tilt table. once on table straps placed across chest, just superior to hip joints, and across calfs. It was a little bit looser across calfs to alleviate pressure and pain on LE. 4 inch step placed under R LE to prevent full weight bearing on L LE. (pt is 50% WB on L LE). Pt elevated to ~35% and states that he is tolerating it. After a few minutes pt raised to ~51%. Pt maintains standing ~15 min at this level, without too much pain. Once at 51% the R LE is bearing weight but the L LE is suspended . Pt transitions back to supine and the process is reversed using mat sheet and ceiling lift to get back to bed, per pt request. Overall pt tolerated treatment well.   PT Discharge Planning   PT Plan PT: 50% WB in L LE, co treat with OT for tilt table see note on 6/9 for process   PT Discharge Recommendation (DC Rec) Long term care facility;home with assist;home with home care physical therapy   PT Rationale for DC Rec discharge pending pt progress and ability to perform stairs. Pt needs to perform 7 steps in order to return home.   PT Brief overview of current status Dependent for transfers with ceiling lift, max A x 2 for bed mobility "   Total Session Time   Timed Code Treatment Minutes 70   Total Session Time (sum of timed and untimed services) 70   Post Acute Settings Only   What unit is patient on? TCU

## 2023-06-09 NOTE — PROGRESS NOTES
"CLINICAL NUTRITION SERVICES - ASSESSMENT NOTE     Nutrition Prescription    RECOMMENDATIONS FOR MDs/PROVIDERS TO ORDER:  None    Malnutrition Status:    Severe malnutrition in the context of acute on chronic illness     Recommendations already ordered by Registered Dietitian (RD):  Encouraged increased oral intakes. Discussed new menu options    Future/Additional Recommendations:  Monitor labs, intakes, and weight trends.     REASON FOR ASSESSMENT  Waldo Bustos is a/an 72 year old male assessed by the dietitian for LOS    NUTRITION/MEDICAL HISTORY  History of type 2 diabetes, hyperlipidemia, chronic back pain, JAIR, DVT/PE on DOAC, and chronic left hip prosthetic joint infection initially admitted to [facility] on 11/22/2022 for scheduled explant of left hip prosthesis, debridement, and reconstruction with antibiotic spacer.  Postop course complicated by profound deconditioning.  He was admitted to TCU initially on 12/23/2022 for physical rehabilitation, however therapies no longer worked with patient after several months of an in bed therapy and his refusals to mobilize.  Underwent biopsy on 4/7/2023 without evidence of infection.  He was transferred back to [facility] for left total hip arthroplasty and reimplantation on 5/26/23 with Dr. Meyers.  Again, postop course complicated by severe deconditioning.  He was readmitted to TCU on 6/2/2023 for ongoing rehabilitation.    FINDINGS  RD met with pt at bedside. Patient states he feels there are just a couple more things on the menu that he may like to eat but is not fully optimistic in his ability to eat much better than previously. Encouraged pt to  Aim for at least 2 meals daily especially when working with therapies. Pt communicated concerns regarding therapies and states he is \"holding his tongue\" and \"being a good boy.\"    CURRENT NUTRITION ORDERS  Diet: Regular    Intake/Tolerance: mostly 100% of documented meals. One 0%    Pt ordering (on " "average) 821 kcal and 29 g protein per day per [employer] over last 6 days. With documented intakes, pt is likely meeting <50% minimum energy and protein needs.    LABS   05/29/23 05:12 05/30/23 06:43 05/31/23 06:30   Sodium 138 138 139   Potassium 4.3 4.0 4.0   Urea Nitrogen 33.6 (H) 28.5 (H) 26.0 (H)   Creatinine 1.09 0.94 0.90   Glucose 147 (H) 135 (H) 129 (H)      06/05/23 07:58 06/05/23 17:04 06/06/23 08:08 06/06/23 17:35 06/07/23 08:21 06/07/23 17:32 06/08/23 09:09 06/08/23 17:15   Glucose 107 (H) 112 (H) 128 (H) 168 (H) 115 (H) 113 (H) 114 (H) 101 (H)     MEDICATIONS  Medications reviewed: colace, culturell, miralax, senna    ANTHROPOMETRICS  Height: 177.8 cm (5' 10\")  Most Recent Weight: 117.4 kg (258 lb 13.1 oz)    IBW: 75.5 kg (155% IBW)  BMI: Obesity Grade II BMI 35-39.9  Weight History: Pt with with  81 lb (25%) weight loss over 6 months.  Wt Readings from Last Encounters:   06/06/23 117.4 kg (258 lb 13.1 oz)   05/26/23 110.4 kg (243 lb 4.8 oz)   05/19/23 110.4 kg (243 lb 4.8 oz)   05/17/23 109.7 kg (241 lb 12.8 oz)   04/10/23 118 kg (260 lb 1.6 oz)   03/23/23 117 kg (257 lb 14.4 oz)   01/09/23 119.7 kg (264 lb)   11/22/22 147.1 kg (324 lb 4.8 oz)   11/14/22 142.9 kg (315 lb)   11/09/22 146.8 kg (323 lb 9.6 oz)   05/19/22 136.1 kg (300 lb)   07/03/18 148.9 kg (328 lb 4.8 oz)     Dosing Weight: 84 kg - ABW using lowest weight during last admission and IBW of 75.5 kg.     ASSESSED NUTRITION NEEDS  Estimated Energy Needs: 7177-7139 kcals/day (25 - 30 kcals/kg)  Justification: Maintenance  Estimated Protein Needs: 100-126 grams protein/day (1.2 - 1.5 grams of pro/kg)  Justification: Increased needs and Obesity guidelines  Estimated Fluid Needs: 1 ml/kcal or per provider pending fluid status    MALNUTRITION  % Intake: </=75% for >/= 1 month (severe)  % Weight Loss: > 10% in 6 months (severe)  Subcutaneous Fat Loss: Upper arm:  Mild  Muscle Loss: Thoracic region (clavicle, acromium bone, deltoid, trapezius, " pectoral):  Moderate and Upper arm (bicep, tricep):  Moderate-severe  Fluid Accumulation/Edema: Does not meet criteria  Malnutrition Diagnosis: Severe malnutrition in the context of acute on chronic illness    NUTRITION DIAGNOSIS  Inadequate oral intake related to poor appetite, pt dislike of hospital food as evidenced by patient report.      INTERVENTIONS  Implementation  Nutrition education for nutrition relationship to health/disease     Goals  Patient to consume % of nutritionally adequate meal trays TID, or the equivalent with supplements/snacks.     Monitoring/Evaluation  Progress toward goals will be monitored and evaluated per protocol.    Dee Alanis MS, RDN, LDN  RD pager: [phone]  WB Weekend/Holiday Pager: [phone]

## 2023-06-09 NOTE — PLAN OF CARE
Pt is A&Ox4. He c/o pain this shift and was given pain medication. He has a poor appetite. Pt has not had BM since 5/27 despite daily miralax and docusat. Pt does not feel constipated and is passing gas. He agreed to a suppository tomorrow if no BM today. Pt was in bed for most of the shift, up in recliner at 1430 to facilitate bed exchange. He participated in therapies.       Patient's most recent vital signs are:     Vital signs:  BP: 125/43  Temp: 97.8  HR: 63  RR: 16  SpO2: 92 %     Patient does not have new respiratory symptoms.  Patient does not have new sore throat.  Patient does not have a fever greater than 99.5.

## 2023-06-09 NOTE — PLAN OF CARE
Goal Outcome Evaluation:    Patient is alert and oriented x 4. He is able to make his needs known. Pt denied SOB and chest pain.  Pt is regular diet, and able to swallow without difficulty.Takes med whole with thin liquid. Pt is PWB to LLE. Assist of 2 with liko lift for transfer. He is continent of bowel and bladder with urinal within reach at bedside. Pt requested PRN Tylenol, Robaxin and Tylenol for left hip pain x 2. GUERRERO drain is intact with 15 ml reddish-pink secretions. Incision site is intact. Continue to monitor and care per plan of care.     Patient's most recent vital signs are:     Vital signs:  BP: 127/56  Temp: 96.8  HR: 64  RR: 16  SpO2: 96 %     Patient does not have new respiratory symptoms.  Patient does not have new sore throat.  Patient does not have a fever greater than 99.5.

## 2023-06-10 NOTE — PROVIDER NOTIFICATION
Paged provider:  Pt on room 414 Akash Haas needs Magnesium replacement, can we change IV infusion to oral form? PT refuse to have IV line.    New order of magnesium oxide 400 mg tablet acknowledge.

## 2023-06-10 NOTE — PLAN OF CARE
Goal Outcome Evaluation:       Overall Pt had no acute issue this shift. Alert and oriented x 4. Able to make needs known. Pt denied having chest pain, SOB, n/V, fever and chills. Continue to refuse suppository despite no bowel movement. Left him incision dressing in place saturated. GUERRERO in place with minimal output. Complained of pain which was managed with PRN oxycodone. Pt verbalized effectiveness. Pt bed switched this shift. No acute issue noted at this time. Will continue with POC.       Patient's most recent vital signs are:     Vital signs:  BP: 117/55  Temp: 97.8  HR: 71  RR: 16  SpO2: 97 %     Patient does not have new respiratory symptoms.  Patient does not have new sore throat.  Patient does not have a fever greater than 99.5.

## 2023-06-10 NOTE — PROGRESS NOTES
"   06/10/23 0006   Appointment Info   Signing Clinician's Name / Credentials (PT) Hector Lakhani PT   Rehab Comments (PT) PT: co treat OT/PT   Therapeutic Activity   Therapeutic Activities: dynamic activities to improve functional performance Minutes (11683) 75   Treatment Detail/Skilled Intervention PT:  continued upright tolerance progression on Tilt table as outlined in last session; supine slide A 2-3 bed to tilt table.  Pt using overhead lift handle for active motion of his trunk while therapists and nursing assistant helped with the sheet and L LE total assist slide to and from the tilt table.  Pt tolerated and estimated ~15 min at 60-65 Degrees max of elevation;  then performed R LE \"mini-squats\" for functional strengthening at 30 degrees of elevation; 3 sets of 10 reps with PT facilitating quad activation and R LE extension;  Pt visably able to lift body and further offload L LE at 30 degrees elevation.  4 inch block was used under the patient's R LE throughout to continue to comply with <50% weight bearing L LE,  cues and assist to keep L LE neutral as well throughout following posterior precautions. No increased pain, tolerated treatment well, and good active participation and communication throughout.  Cues and LE positioning changes to obtain neutral positioning and promote HC/HS stretch in modified standing position to promote functional ROM (ankle/knee)   PT Discharge Planning   PT Plan PT: 50% WB in L LE, co treat with OT for tilt table see note as above for details;  Continue to promote sitting tolerance and pre scoot transfer training to help pt with his goal of commode transfers etc.    PT Discharge Recommendation (DC Rec) Long term care facility;home with assist;home with home care physical therapy   PT Rationale for DC Rec discharge pending pt progress and ability to perform stairs. Pt needs to perform 7 steps in order to return home.   PT Brief overview of current status Dependent for transfers " with ceiling lift, max A x 2 for bed mobility   Total Session Time   Timed Code Treatment Minutes 75   Total Session Time (sum of timed and untimed services) 75   Post Acute Settings Only   What unit is patient on? TCU

## 2023-06-10 NOTE — PLAN OF CARE
Pt is A&Ox4. He c/o pain this shift and was given pain medication. He has a poor appetite. Pt and this RN had a long discussion about his progress. He felt he was not making any progress and was quite discouraged. Stated he had been trying and expressed anger about his behavior contract. RN gave him multiple examples of how he was not engaged prior to the latest surgery which he acknowledged. Enc pt to participate when rolling from side to side with grasping the bed rail to assist, doing arm exercises, ankle pumps etc. Pt had much greater attitude the rest of the day and participated well with ADL's and therapy. Pt has not had BM since 5/27 despite daily miralax and docusate. Pt does not feel constipated and is passing gas. He agreed to a enema today with no results. Provider updated. Pt was in bed for most of the shift, up in recliner at 1430. He participated in therapies well. Continue POC.       Patient's most recent vital signs are:     Vital signs:  BP: 122/62  Temp: 97.7  HR: 65  RR: 16  SpO2: 90 %     Patient does not have new respiratory symptoms.  Patient does not have new sore throat.  Patient does not have a fever greater than 99.5.

## 2023-06-10 NOTE — PLAN OF CARE
Goal Outcome Evaluation:    Alert and oriented x4. No reported sob or cp. Able to make needs known. GUERRERO drain on left hip intact, drained 30 ml bright red output. Refused to use foam wedge leg elevator and skin assessment. PRN oxycodone, tylenol and robaxin given. Still no bowel movement during shift. Patient slept well overnight. Comfortable sleeping in bed during safety rounds. Call light within reach. Continue with the plan of care.      Patient's most recent vital signs are:     Vital signs:  BP: 117/55  Temp: 97.8  HR: 71  RR: 16  SpO2: 97 %     Patient does not have new respiratory symptoms.  Patient does not have new sore throat.  Patient does not have a fever greater than 99.5.

## 2023-06-10 NOTE — PROGRESS NOTES
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  Postop course complicated by profound deconditioning.  He was admitted to TCU initially on 12/23/2022 for physical rehabilitation, however therapies no longer worked with patient after several months of an in bed therapy and his refusals to mobilize.  Underwent biopsy on 4/7/2023 without evidence of infection.  He was transferred back to [facility] for left total hip arthroplasty and reimplantation on 5/26/23 with Dr. Meyers.  Again, postop course complicated by severe deconditioning.  He was readmitted to TCU on 6/2/2023 for ongoing rehabilitation.    # S/p left hip explantation of left hip antibiotic spacer and revision of left total hip arthroplasty (5/26/2023)- surgery by Dr. Rahman's team.  Developed left foot drop postoperatively.  AFOs ordered but patient has been declining to use these.  OR cultures no growth to date.  Pain has overall been well controlled.  Doing very well with therapies.   - Activity: 50% PWB LLE with posterior hip precautions x3 months  - AFO ordered for foot drop  - PT, OT consults  - Knee should be kept event in a foam leg elevator to relax sciatic nerve  Pain: Schedule Robaxin 500 mg 4 times a day; continue as needed Tylenol and as needed oxycodone  - Follow-up with Dr. Meyers in clinic as scheduled on 6/26/2023  - Will discuss with Ortho [facility] regarding dressing changes to surgical wound    # Adjustment disorder with depressed mood  # Prolonged grief disorder  Patient has been with depressed mood in setting of ongoing  health issues, as evidenced by lack of motivation to work with therapies during both TCU admissions.  Per chart review he has made passive suicidal statements to nursing in the past.  Psychiatry saw patient during hospital admission on 5/31 due to lack of motivation to work with therapies postoperatively, patient declined interest in starting any psychiatric medications at this time.  Was willing to engage with health psychology.  Talk with patient today, he shared that he and his significant other have experience a lot of loss and stress over the last couple of years, in addition to his medical and orthopedic issues.  - Per chart review health psychology has not been available to follow here, will put in referral for outpatient follow-up and continuity of care  - May benefit from anti-depressant therapy pending progress with therapies and plan for dispo     # Type 2 diabetes - last hemoglobin A1c 5.8% on 3/6/2023.  On glipizide, metformin, Actos, and Victoza prior to hospitalizations and surgery.  Has been maintained solely on sliding scale insulin since then.  Last 24 hour BGs as follows:   Recent Labs   Lab 06/10/23  0745 06/09/23  1716 06/08/23  1715 06/08/23  0909 06/07/23  1732 06/07/23  0821   * 173* 101* 114* 113* 115*     - Continue medium sliding scale with NovoLog  - Continue holding PTA medications for now  - Accu-Cheks 3 times daily before meals and at bedtime  - Hypoglycemia protocol in place    # Hyperlipidemia - Continue Lipitor 40 mg daily    #JAIR - Continue nasal CPAP whenever sleeping    #Unprovoked DVT in left common femoral, deep and superficial femoral, popliteal, and posterior tibial veins, and pulmonary embolism - Diagnosed in 2018.  Continue PTA apixaban 5 mg twice daily.    # Constipation - patient reports not having a bowel movement since his surgery.  He denies abdominal pain. Trialed enema today without success.  Denies abdominal pain.    - Check AXR to evaluate for ileus   -  Continue Colace twice daily   - Continue senna and MiraLAX as currently ordered  - Check magnesium level   - Continue to encourage oral intake and hydration      Consulting Services: PT and OT      CODE: Full  DVT: DOAC  Diet: Regular  Indications for Psychotropic Medications: N/A  Disposition: Pending therapies      Mariana Sorto, CNP, APRN  Internal Medicine [facility] Hospitalist  [facility]  Pager [phone]    ________________________________________________________________    Subjective & Interval History:      Rahat is seen in his room.  He just finished tilt table with therapies.  Feels good.  Pleasant and communicative.  He has not had a BM since surgery.  Denies abdominal pain.  Reports passing gas.  No nausea, vomiting, or feeling bloated.  He denies dyspnea and chest pain.     Last 24 hour care team notes reviewed.   ROS: 4 point ROS (including Respiratory, CV, GI and ) was performed and negative unless otherwise noted in HPI.     Medications: Reviewed in EPIC.    Physical Exam:    Blood pressure 122/62, pulse 65, temperature 97.7  F (36.5  C), temperature source Oral, resp. rate 16, weight 117.4 kg (258 lb 13.1 oz), SpO2 90 %.    GENERAL: Alert and oriented x 3. Well nourished, well developed.  Large body habitus.  No acute distress.    HEENT: Normocephalic, atraumatic. Anicteric sclera. Mucous membranes moist.   CV: RRR. S1, S2. No murmurs appreciated.   RESPIRATORY: Effort normal on room air. Lungs CTAB with no wheezing, rales, or rhonchi.   GI: Abdomen soft and non distended, bowel sounds present x all 4 quadrants. No tenderness, rebound, or guarding.   NEUROLOGICAL: No focal deficits. Follows commands.    MUSCULOSKELETAL: No joint swelling or tenderness. Moves all extremities.   EXTREMITIES: No gross deformities.  Trace peripheral edema in bilateral legs.   SKIN: Grossly warm, dry, and intact. No jaundice. No rashes.         ROUTINE IP LABS (Last four results)  CMP   Recent Labs   Lab  06/10/23  0745 06/09/23  1716 06/08/23  1715 06/08/23  0909   * 173* 101* 114*     CBC No lab results found in last 7 days.  INR No lab results found in last 7 days.

## 2023-06-11 NOTE — PLAN OF CARE
Goal Outcome Evaluation:    1500H-2330H    Magnesium level of 1.3 at the start of the shift. Refused to have IV line for magnesium replacement protocol. Provider paged with new orders of PO Mag oxide 400 mg TID. Lab draw scheduled tomorrow morning. PRN tylenol and oxycodone given. Patient is cooperative with cares during shift, requested to use bedpan and tried to move himself on bed independently. Had a small bowel movement with lots of gas passing.  Patient seems to be in a good mood and show interest regarding his progress. GUERRERO drain on left hip intact, emptied. Comfortable in bed this time. Call light within reach.     Abdominal xray result shows nonobstructive bowel gas pattern and large amount of formed stool in the colon.    2300H-0730H    Patient had his lab drawn for magnesium level while doing the replacement. New lab draw placed 6/11 at 2200H, 2 hours after replacement completion. GUERRERO drain intact. Used pillow on left leg as elevator. No acute issue or recent changes from previous shift. Slept well overnight. Continue with the plan of care.      Patient's most recent vital signs are:     Vital signs:  BP: 119/50  Temp: 97.4  HR: 70  RR: 16  SpO2: 98 %     Patient does not have new respiratory symptoms.  Patient does not have new sore throat.  Patient does not have a fever greater than 99.5.

## 2023-06-11 NOTE — PLAN OF CARE
Goal Outcome Evaluation:      Plan of Care Reviewed With: patient    Overall Patient Progress: improving    Outcome Evaluation: more motivated    Diagnosis: Deconditioning post L hip I&D and revision  Mental Status: A&O 4  Activity/dangle: Ax2 lift  Diet: Reg  Pain: High, received oxycodone and tylenol x2, robaxin x1  Tran/Voiding: Urinal  Tele/Restraints/Iso: No  02/LDA: GUERRERO drain L hip    Patient's most recent vital signs are:      Vital signs:  Temp: 98.1  F (36.7  C) Temp src: Oral BP: 129/58 Pulse: 67   Resp: 18 SpO2: 91 % O2 Device: None (Room air)         Patient does not have new respiratory symptoms.  Patient does not have new sore throat.  Patient does not have a fever greater than 99.5.

## 2023-06-12 NOTE — CARE PLAN
"PT:  Goal of session was tilt table again with Co-treat with OT. Pt found in bed with soiled linens, Nursing assist states pt did not ask ahead of time to be cleaned up in accordance with pt's behavioral plan. Throughout session pt is verbally abusive to staff, muttering under breath regarding both the skill of therapists and the quality of equipment. When asked to clarify pt does not respond or becomes angry. Throughout session pt is asked to direct therapist's to his comfort or asked is preference on things and this seems to irritate him, however author moving without asking permission also makes the pt upset.   Pt has multiple bouts of outbursts at staff about their performance including \"moving too fast\" or \"jerking him around.\" Pt is upset that today is \"so much worse than yesterday\" when asked for clarity on how yesterday was better, he states \"The sharri was 3x bigger than you.\" Pt specifically states \"I would like to see different therapists.\"     As is similar to previous sessions, pt demonstrates severe pain response to minimal movement. Active participation with UE is incredibly slow, LE active participation is less than 10% on R, less than 5% on L. Despite using lift component for pt to use UE to off weight body in order to participate in sheet sliding from bed <> tilt table, there are many times when the transfer is near total assist of 2-3.     When author communicates to pt that she is unable to lift his entire body without his participation he states \"clearly, and that's the problem.\" Pt does achieve 60 degrees in the tilt table total but consistently complains of pain in L leg. At 30 degrees, then 50 degrees pt performs \"mini squat\" like movements with R LE but refuses to allow author to provide any facilitation with technique. Returned to supine and transferred with sheet back to bed, A x 3-4 present, pt is again very frustrated, slow moving, and resistance to any feedback from authors. Pt desires to " be cleaned up and due to need for access to backside and removing soiled linens, pt requries multiple rolls. Pt is resistant to increased help during roll due to pain response, Ultimately a partial roll requires Max A x 1, min A x 1.

## 2023-06-12 NOTE — PROGRESS NOTES
[facility]  Internal Medicine Progress Note    TCU Day # 10    Assessment & Plan: Waldo Bustos is a 72 year old male with a history of type 2 diabetes, hyperlipidemia, chronic back pain, JAIR, DVT/PE on DOAC, and chronic left hip prosthetic joint infection initially admitted to [facility] on 11/22/2022 for scheduled explant of left hip prosthesis, debridement, and reconstruction with antibiotic spacer.  Postop course complicated by profound deconditioning.  He was admitted to TCU initially on 12/23/2022 for physical rehabilitation, however therapies no longer worked with patient after several months of an in bed therapy and his refusals to mobilize.  Underwent biopsy on 4/7/2023 without evidence of infection.  He was transferred back to [facility] for left total hip arthroplasty and reimplantation on 5/26/23 with Dr. Meyers.  Again, postop course complicated by severe deconditioning.  He was readmitted to TCU on 6/2/2023 for ongoing rehabilitation.    # S/p left hip explantation of left hip antibiotic spacer and revision of left total hip arthroplasty (5/26/2023)- surgery by Dr. Rahman's team.  Developed left foot drop postoperatively.  AFOs ordered but patient has been declining to use these.  OR cultures no growth to date.  Pain has overall been well controlled.   -Activity: 50% PWB LLE with posterior hip precautions [id number]  -AFO ordered for foot drop  -PT/OT  -Knee to be kept even in a foam leg elevator to relax sciatic nerve  -Pain: Scheduled robaxin 4 times a day, APAP PRN   -oxycodone reduced to 2.5-5 mg (from 5-10 mg) 6/12 for visual hallucinations and vivid dreams  -follow up with Dr Meyers in clinic 6/26/2023     #Adjustment disorder with depressed mood  #Prolonged grief disorder  Patient has been with depressed mood in setting of ongoing health issues, as evidenced by lack of motivation to work with therapies during both TCU admissions.  Past history of passive  suicidal statements.  Psychiatry saw patient during hospital admission on 5/31 due to lack of motivation with therapies..  Patient declined interest in starting any psychiatric medications.  Willing to engage with health psychology.  -Outpatient health psychology consult  -Continue to monitor for willingness to start antidepressant therapy    #Type 2 diabetes mellitus  Last hemoglobin A1c 5.8% on 3/6/2023.  On glipizide, metformin, Actos, and Victoza prior to hospitalizations and surgery.  -Continue medium sliding scale NovoLog  -Continue holding PTA medications for now  -POCT BG 3 times daily before meals and at bedtime  -Hypoglycemia protocol in place  Recent Labs   Lab 06/12/23  0807 06/11/23  1734 06/11/23  0800 06/10/23  1809 06/10/23  1444 06/10/23  0745   * 96 117* 129* 119* 106*       #Constipation  Abdomen x-ray 6/10 showed nonobstructive bowel gas pattern and large stool burden.  -Continue Colace 100 mg twice daily  -Continue senna 1 tablet twice daily  -Continue MiraLAX daily  -Consider decrease in amount of stool softeners when patient off oxycodone    #Hypomagnesemia  -magnesium replacement protocol    #Severe malnutrition in the context of acute on chronic illness  Goal for patient is to consume % of meals TID. Goal is 3028-7922 kcals/daily. Protein needs- 100-126 grams/daily.   -RD following and appreciate recs  -regular diet  -Weekly weights  -I/Os      Stable and Resolved Issues:  #Hyperlipidemia-continue PTA Lipitor 40 mg daily  #JAIR-CPAP when sleeping  #Unprovoked DVT in left common femoral, deep and superficial femoral, popliteal, and posterior tibial veins, and pulmonary embolism-diagnosed in 2018.  Continue PTA apixaban 5 mg twice daily    Consulting Services: PT and OT      CODE: Full  DVT: DOAC  Diet: Regular  Indications for Psychotropic Medications: N/A  Vaccinations:   Disposition: Pending therapies      Emperatriz Wright, ALEENA, APRN  Internal Medicine [facility] Hospitalist  [facility]  [place]  Pager [phone]    ________________________________________________________________    Subjective & Interval History:      Waldo Bustos is at [facility] for physical rehabilitation. Discussed changes in pain medication as above. Pt is most concerned about slow progression with therapies. Encouraged pt to take it one day at a time and do his best in therapy. C/o stiff neck. Good appetite, normal bowel movements. Earlier dreams/hallucinations resolved. Pt remembers events.      Last 24 hour care team notes reviewed.   ROS: 4 point ROS (including Respiratory, CV, GI and ) was performed and negative unless otherwise noted in HPI.     Medications: Reviewed in EPIC.    Physical Exam:    Blood pressure 125/48, pulse 76, temperature 98.6  F (37  C), temperature source Oral, resp. rate 16, weight 117.4 kg (258 lb 13.1 oz), SpO2 90 %.    GENERAL: Alert and oriented x 3. Well nourished, well developed.  No acute distress.    HEENT: Normocephalic, atraumatic. Anicteric sclera. Mucous membranes moist.   CV: RRR. S1, S2. No murmurs appreciated.   RESPIRATORY: Effort normal on RA. Lungs CTAB with no wheezing, rales, or rhonchi.   GI: Abdomen soft and non distended, bowel sounds present x all 4 quadrants. No tenderness, rebound, or guarding.   NEUROLOGICAL: No focal deficits. Follows commands.  Strength weak  in upper and lower extremities.   MUSCULOSKELETAL: No joint swelling or tenderness. Moves all extremities.   EXTREMITIES: No gross deformities. No peripheral edema.   SKIN: Grossly warm, dry, and intact. No jaundice. No rashes.       Lines/Tubes/Drains:          ROUTINE IP LABS (Last four results)  CMP   Recent Labs   Lab 06/12/23  0807 06/12/23  0008 06/11/23  1734 06/11/23  0800 06/11/23  0104 06/10/23  1809 06/10/23  1444   NA  --   --   --   --   --   --  139   POTASSIUM  --   --   --   --   --   --  4.1   CHLORIDE  --   --   --   --   --   --  103   CO2  --   --   --   --   --   --  25   ANIONGAP  --   --    --   --   --   --  11   *  --  96 117*  --  129* 119*   BUN  --   --   --   --   --   --  17.2   CR  --   --   --   --   --   --  1.10   MAIKOL  --   --   --   --   --   --  8.3*   MAG  --  1.5*  --   --  1.4*  --  1.3*     CBC No lab results found in last 7 days.  INR No lab results found in last 7 days.    OTHER:  NA         Medical Decision Makin MINUTES SPENT BY ME on the date of service doing chart review, history, exam, documentation & further activities per the note.

## 2023-06-12 NOTE — PROGRESS NOTES
"   06/12/23 1031   Appointment Info   Signing Clinician's Name / Credentials (OT) Aga Reddy, OTR/L LUIS   Rehab Comments (OT) OT: Chandrakant w/ PT   General Information   Patient/Family Therapy Goal Statement (OT) (S)  on eval, pt stated his goals are :\"get up\", \"out of bed on my own', 'get on my feet\",'sit on a commode'   OT Goals   Therapy Frequency (OT) 6 times/wk   OT Predicted Duration/Target Date for Goal Attainment 06/30/23   OT Goals OT Goal 1   OT: Hygiene/Grooming supervision/stand-by assist   OT: Upper Body Dressing Supervision/stand-by assist  (gown)   OT: Upper Body Bathing Minimal assist;using adaptive equipment   OT: Bed Mobility rolling;Supervision/stand-by assist;within precautions  (bed rail)   OT: Goal 1 Pt kolby full upright position in standing w/ AD and assist of 2 therapists needed for advancing POC to improve function w/ mob and adls   Therapeutic Activities   Therapeutic Activity Minutes (64980) 60   Treatment Detail/Skilled Intervention OT: co Rx w/PT to provide pt w/ needed skill set of both therapist including prob solving for positioning and handling skills as well as to faciliate conversation w/ pt re: POC and progression of care pertaining to goals established by pt on OT eval. pt agreed to Behavioral plan including initiation of nsg cares and requesting pain med's prior to set therapy schedule to assure pt ready to partic in therapy, on th arrival pt was laying in urine saturated sheets and received  some of pain meds, th consulted nsg assist to who verified pt did not initiate asking to be cleaned up/pericares/sheets changed prior to schedule therapy session, RN stated he did ask for pain meds prior to therapy but she was unable to provide all pain meds due to pt was hallucinated during time he requested so RN provided pt w/ some meds but held off on some meds until consulting MD, during OT /PT co Rx th engaged pt in discussion of  POC including plan for today's session and discussion " "of other options for goal to get pt standing needed to progress to transfers and other functional activites, th discussed pros and cons of other options and failed options that incuded standing frame. pt agreed to tilt table, during the session that OT/PT allowed pt to direct the movements, pt became agitated and verbally raised voice and made condescending comments toward this therapist , c/o both OT/PT  staff about their performance including \"moving too fast\" or \"jerking him around.\" Pt is upset that today is \"so much worse than yesterday\" when asked for clarity on how yesterday was better, he states \"The sharri was 3x bigger than you.\" OT attempted to explain to pt that we are trying to do what is in pt's best interest and we are involving pt in POC and approach, th stated \"we are not giving up on you. we are trying to figure out how to progress you to meet your goals\" pt stated \"well just give up and write that I refused \",  Pt specifically states \"I would like to see different therapists.\" th indicated that she would reschedule pt w/ different therapist tomorrow but there was no one else to work w/ pt at this given time during this session, pt said \"Ok for today but not tomorrow\", during session pt was in tilt table, OT/PT cued pt for repositioning of body and R LE to faciliate better alignment and faciliate activation of R LE for goal of improving function to progress w/ standing. see PT note for  details re: tilt table, OT/PT assisted pt w/ return to bed w/ use of male rehab tech/female CNA,   Total Session Time   Timed Code Treatment Minutes 60   Total Session Time (sum of timed and untimed services) 60   Post Acute Settings Only   What unit is patient on? TCU       "

## 2023-06-12 NOTE — PLAN OF CARE
Goal Outcome Evaluation:    Patient is alert and oriented x 4. He is able to make his needs known. Pt denied SOB and chest pain.  Pt is regular diet, and able to swallow without difficulty.Takes med whole with thin liquid. Pt is PWB to E. Assist of 2 with liko lift for transfer. He is continent of bowel and bladder with urinal within reach at bedside. Pt requested PRN Oxycodone, Robaxin and Tylenol for left hip pain x 1. GUERRERO drain is intact with very small reddish-pink secretions in bulb. Incision site is intact. Noted some redness and tenderness in pt's groin area. Performed caridad-care with soap and water and applied barrier cream. Continue to monitor and care per plan of care.      Patient's most recent vital signs are:     Vital signs:  BP: 111/52  Temp: 97.9  HR: 68  RR: 18  SpO2: 95 %     Patient does not have new respiratory symptoms.  Patient does not have new sore throat.  Patient does not have a fever greater than 99.5.

## 2023-06-12 NOTE — PROGRESS NOTES
"   06/12/23 1031   Appointment Info   Signing Clinician's Name / Credentials (OT) Aga Reddy, OTR/L LUIS   Rehab Comments (OT) OT: Chandrakant w/ PT   General Information   Patient/Family Therapy Goal Statement (OT) (S)  on eval, pt stated his goals are :\"get up\", \"out of bed on my own', 'get on my feet\",'sit on a commode'   OT Goals   Therapy Frequency (OT) 6 times/wk   OT Predicted Duration/Target Date for Goal Attainment 06/30/23   OT Goals OT Goal 1   OT: Hygiene/Grooming supervision/stand-by assist   OT: Upper Body Dressing Supervision/stand-by assist  (gown)   OT: Upper Body Bathing Minimal assist;using adaptive equipment   OT: Bed Mobility rolling;Supervision/stand-by assist;within precautions  (bed rail)   OT: Goal 1 Pt kolby full upright position in standing w/ AD and assist of 2 therapists needed for advancing POC to improve function w/ mob and adls   Therapeutic Activities   Therapeutic Activity Minutes (63360) 60   Treatment Detail/Skilled Intervention OT: co Rx w/PT to provide pt w/ needed skill set of both therapist including prob solving for positioning and handling skills as well as to faciliate conversation w/ pt re: POC and progression of care pertaining to goals established by pt on OT eval. pt agreed to Behavioral plan including initiation of nsg cares and requesting pain med's prior to set therapy schedule to assure pt ready to partic in therapy, on th arrival pt was laying in urine saturated sheets and received  some of pain meds, th consulted nsg assist to who verified pt did not initiate asking to be cleaned up/pericares/sheets changed prior to schedule therapy session, RN stated he did ask for pain meds prior to therapy but she was unable to provide all pain meds due to pt was hallucinated during time he requested so RN provided pt w/ some meds but held off on some meds until consulting MD, during OT /PT co Rx th engaged pt in discussion of  POC including plan for today's session and discussion " "of other options for goal to get pt standing needed to progress to transfers and other functional activites, th discussed pros and cons of other options and failed options that incuded standing frame. pt agreed to tilt table, during the session that OT/PT allowed pt to direct the movements, pt became agitated and verbally raised voice and made condescending comments toward this therapist , c/o both OT/PT  staff about their performance including \"moving too fast\" or \"jerking him around.\" Pt is upset that today is \"so much worse than yesterday\" when asked for clarity on how yesterday was better, he states \"The sharri was 3x bigger than you.\" OT attempted to explain to pt that we are trying to do what is in pt's best interest and we are involving pt in POC and approach, th stated \"we are not giving up on you. we are trying to figure out how to progress you to meet your goals\" pt stated \"well just give up and write that I refused \",  Pt specifically states \"I would like to see different therapists.\" th indicated that she would reschedule pt w/ different therapist tomorrow but there was no one else to work w/ pt at this given time during this session, pt said \"Ok for today but not tomorrow\", during session pt was in tilt table, OT/PT cued pt for repositioning of body and R LE to faciliate better alignment and faciliate activation of R LE for goal of improving function to progress w/ standing. see PT note for  details re: tilt table, OT/PT assisted pt w/ return to bed w/ use of male rehab tech/female CNA,   OT Discharge Planning   OT Plan OT: LLE 50% WB, posterior hip precautions(no hip ADD past midline; 90 degree hip flexion), L hip drain, Rx: bed mob, dynamic sitting bal, recommend coRx w/ PT (working on mob and plan to alter approach to standing w/trial use of tilt table due to unsuccessful on multiple attempts to place in standing frame)   OT Discharge Recommendation (DC Rec)   (antic need for setting w/ 24/7 assist) "   OT Brief overview of current status OT: pt agiated and selective on compliance w/ items outlined on behavior plan.   Total Session Time   Timed Code Treatment Minutes 60   Total Session Time (sum of timed and untimed services) 60   Post Acute Settings Only   What unit is patient on? TCU

## 2023-06-12 NOTE — PLAN OF CARE
Goal Outcome Evaluation:    FOCUS/GOAL    Bowel management, Bladder management, Medication management, Pain management, Wound care management, Mobility, Skin integrity and Safety management    ASSESSMENT, INTERVENTIONS AND CONTINUING PLAN FOR GOAL:    Pt is A&OX4, calm, & cooperative with care. Denied CP, SOB, & n/v. A of 2 with Liko lift for transfer; was not OOB this shift. Continent for both B&B; uses bedpan for BM. No BM this shift. Urinal at the bedside for bladder. Takes med whole with thin liquid. Pt did not request for pain meds this shift. Dressing to L hip has blood pulled to the GUERRERO drain exit point under the Aquacel dressing. GUERRERO drain intact with bright red output. No acute issue. Able to make needs known & call light within reach. Will continue with plan of care.    Patient's most recent vital signs are:     Vital signs:  BP: 111/52  Temp: 97.9  HR: 68  RR: 18  SpO2: 95 %     Patient does not have new respiratory symptoms.  Patient does not have new sore throat.  Patient does not have a fever greater than 99.5.

## 2023-06-12 NOTE — PROGRESS NOTES
Status at Admission - 6/4/2023 Current Status - 6/12/2023   Bed Mobility   Max A x 2 for rolling    Unable to test further bed mobility due to time constraints given pt's painful and slow movement.   Max A x1, Min A x 1 for rolling    Max A x 2 for sit <> supine and 50% assist in supported sitting.   Transfer   A x 3 with ceiling lift, third person is used to control L LE    A x 3 for transfer from bed > tilt table with sheet   Gait   Unable   Unable   Stairs   Unable   Unable   Wheelchair Propulsion   Unable to tolerate sitting in chair for any functional amount of time.    Unchanged          Assessment of Progress Since Last Update: Pt has made some progress in functional mobility since time of evaluation as detailed above. Most specifically, pt has progressed to tolerating sitting EOB for ~ 15 minutes at a time with A x 2 and has begun partially standing in tilt table with staff.    Barriers to Progress: Pt continues to have significant pain which a barrier to all movement. Pt's pain and deconditioning lead to need for heavy assist, often 3 people to maintain safety for staff. Pt has been willing to participate in therapy sessions but has demonstrated unwillingness to be challenged or try novel ideas during session, often limiting therapist's ability to creatively assist pt. There is some evidence of pt's behavioral plan as agreed to by pt and staff has not been followed (I.e. having soiled bedding when therapy arrived on 6/12).   Proposed Solutions to Improve Barriers: Pt has an established and written plan with staff regarding how to optimize therapy outcomes including calling ahead of time for nursing to administer pain meds and clean the pt is necessary. Therapy team has been co-treating to optimize skill for pt to improve outcomes.   Justification for Continued Therapy Services: Without further skilled intervention by therapy, pt will effectively be bed bound with total assist of 2-3 for all mobility and  cares. Pt will be at a significant risk for skin breakdown and further deconditioning which will lead to poor surgical outcomes for L BELLA and overall quality of life.    Additional Considerations for Safe and Efficient Discharge: Pt's best and safest discharge location is unknown at this time given his current condition, will continue to be assessed by team.

## 2023-06-13 NOTE — PROGRESS NOTES
"   06/13/23 4808   Appointment Info   Signing Clinician's Name / Credentials (PT) Macy Florence PTA   PT Assistant Visit Number 3   Therapeutic Activity   Therapeutic Activities: dynamic activities to improve functional performance Minutes (58725) 47   Treatment Detail/Skilled Intervention PT: focus on progression of tilt table, but Pt resistant stating \"that coffin makes my body hurt\". Reminded Pt that standing frame is not an option d/t reported pain with L knee flexion and utilizing lift pants for lite gait would cause significant pain in hips, so tilt table is our option for progression for standing activities. Spent significant time redirecting Pt, educating Pt on importance of participating in therapies to decrease pain and increase functional independence. Encouraged Pt to sit up in recliner for all meals for improved OOB siting tolerance. Pt verbalized agreement and checked with Nsg of agreement. Nsg will verify delivery of high backed sling for improved cervical protection. Significant time spent encouraging Pt to move BLE to EOB for sitting EOB activities. Pt frequently tries to begin c/o aches and pains or talking about dreams and requires frequent redirection. Attempted sitting EOB requiring mod/max A x 3, but d/t Pt being unwilling to perform hip flexion, Pt was unsafe at EOB and required returning to supine w/max A x 3. Educated Pt on importance of LE movement and encouraged heel slides outside of therapy for decreased pain and increased participation of functional activities. Pt able to perform R heel slides 1 set x 10 reps, mod A to maintain positioning as Pt tends to keep B knees externally rotated. Pt unwilling to perform w/LLE. Pt verbalized all items therapy and Nsg have tried w/Pt just aggrevate their pain. Provided additional education on importance of movement and participation in therapies to decrease need for these items and increase functional independence. Pt became silent and did not " "reply. Rolling w/max A x 2 w/2 pillows between knees for improved hip positioning, but Pt stated \"it was a horse a piece. I could only do it becase he rolled me\". Despite this, Pt was able to roll with assist in shorter amount of time.   PT Discharge Planning   PT Plan PT: 50% WB in L LE, use rehab techs to assist w/either tilt table (need to get Pt standing but unable to do standing frame - won't flex L knee) or sitting upright (tends to not flex hips so is high risk to slide off EOB). Needs high back sling if going to transfer out of bed.   PT Discharge Recommendation (DC Rec) Long term care facility   PT Rationale for DC Rec discharge pending pt progress and ability to perform stairs. Pt needs to perform 7 steps in order to return home.   PT Brief overview of current status Dependent for transfers with ceiling lift (high back sling), max A x 2 for bed mobility   Total Session Time   Timed Code Treatment Minutes 47   Total Session Time (sum of timed and untimed services) 47   Post Acute Settings Only   What unit is patient on? TCU       "

## 2023-06-13 NOTE — PLAN OF CARE
Goal Outcome Evaluation:      Plan of Care Reviewed With: patient    Overall Patient Progress: improving     Pt A&Ox4, denies nausea, CP, SOB. Ax2 liko lift. Pt is 50% partial WB to LLE. PO meds whole with thin liquids. Aquacell on L hip. Pt on  BG checks.    Magnesium still low (1.5) this AM, MD notified. Pt refuses IV placement, pt just finished oral Mag replacement this AM. Pt still very constipated, but refusing suppositories.    1900: Next shift RN asked writer about dressing change, removing aquacell, drain. Writer realized these were not done this shift. Writer and RN emptied drain, output 30mL. Writer and next shift RN noticed yellow/pus around GUERRERO drain. Next shift RN to follow up.    Continue to monitor.

## 2023-06-13 NOTE — PLAN OF CARE
Goal Outcome Evaluation:  [id number]  Patient is refusing to have GUERRERO site cleaned and to change the dressing on his L hip, soaked with previous drainage .GUERRERO site noticed to have yellowish drainage put a sticky note to provider to have it check in the morning d/t patient refusal for writer to do or perkins the dressing. Education given to patient regarding dressing changes of the wound and GUERRERO site. Magnesium replacement done this AM, lab rechecked tomorrow. At 2130 writer told patient to have him cleaned d/t noticed the top sheet has trace of dried/new urine, declined and told writer that he is okay.Reapproached  Patient x2 and was able to convinced him . On the dressing of the L hip agreed at first later declined to have it changed and making excuses ( Like my back neck and L hips really hurt so  Much, reposition my leg, my neck, will sleep for awhile ) . It took for writer an hour to take his HS medication. Call light with in reach. Continue with current plan of care.    Blood pressure 125/48, pulse 89, temperature 98.6  F (37  C), temperature source Oral, resp. rate 18, weight 117.4 kg (258 lb 13.1 oz), SpO2 93 %.

## 2023-06-13 NOTE — PLAN OF CARE
Goal Outcome Evaluation:    Patient is alert and oriented x4. No reported sob and cp. Able to make needs known. GUERRERO drain intact, yellowish discharge noted. A sticky note is already created for the provider. Magnesium level of 1.5, replacement done yesterday, schedule lab draw recheck today. No PRN medications given. Patient refused skin assessment overnight. Sleeping well during safety rounds. Call light within reach. Continue with the plan of care.    Magnesium of 1.6 mg/dl this AM. On RN manage replacement protocol. Patient is still sleeping this time. Endorsed to the next shift.     Patient's most recent vital signs are:     Vital signs:  BP: 125/48  Temp: 98.6  HR: 89  RR: 18  SpO2: 93 %     Patient does not have new respiratory symptoms.  Patient does not have new sore throat.  Patient does not have a fever greater than 99.5.

## 2023-06-13 NOTE — PROGRESS NOTES
"   06/13/23 1140   Appointment Info   Signing Clinician's Name / Credentials (OT) REINIER Stover   OT Assistant Visit Number 7   OT Goals   Therapy Frequency (OT) 6 times/wk   OT Predicted Duration/Target Date for Goal Attainment 06/30/23   OT Goals OT Goal 1   OT: Hygiene/Grooming supervision/stand-by assist   OT: Upper Body Dressing Supervision/stand-by assist  (gown)   OT: Upper Body Bathing Minimal assist;using adaptive equipment   OT: Bed Mobility Supervision/stand-by assist;rolling;within precautions  (bed rail)   OT: Goal 1 Pt kolby full upright position in standing w/ AD and assist of 2 therapists needed for advancing POC to improve function w/ mob and adls   Self-Care/Home Management   Self-Care/Home Mgmt/ADL, Compensatory, Meal Prep Minutes ([id number]) 45   Treatment Detail/Skilled Intervention BRII: Goal of session upon arrival to work on bed mobility/rolling and EOB sitting tolerance. Pt appears intermittently lethargic, improved by end of session. Pt reports 9/10 pain and \"feeling like I pulled my muscle\" in groin area (appears to be hip flexors). When asked what could have caused this increase in daniella hip pain, pt states, \"I don't know you guys are the ones that do all this to me\". Re-inforced POC. Pt also reporting, \"you guys keep coming in and waking me up too early and expect me to do these things\". Writer explained to patient that it is 11:15AM and patient has never requested appointments be later than 11AM. Pt states, \"well I don't know when a good time is then\". Pt bed linens soiled. Pt reporting, \"I just spilled something\". With encouragement and step by step explanation of technique, pt agrees to trial rolling to allow staff to remove soiled sheets. Pt required Ax4 to roll L and R to replace linens. Pt participates with UEs and bed rail, LB is max Ax2 and requires substantial time to move d/t pain. Due to 45 minutes required to change linens and roll x2, no other treatment provided d/t schedule. "   OT Discharge Planning   OT Plan OT: LLE 50% WB, posterior hip precautions(no hip ADD past midline; 90 degree hip flexion), L hip drain, Rx: bed mob, dynamic sitting bal, recommend coRx w/ PT (working on mob and plan to alter approach to standing w/trial use of tilt table due to unsuccessful on multiple attempts to place in standing frame)   OT Discharge Recommendation (DC Rec)   (antic need for setting w/ 24/7 assist)   OT Brief overview of current status BRII: Ax4 for rolling due to increased pain today. Due to increased pain in LE/s and cervical neck, patient behaviors, and overall weakness/deconditioning, limited functional progress has been made. Behavioral plan was created with patient on 6/7. Patient has been non-compliant with allowing nursing cares per chart review. Pt has participated in each therapy session yet has had soiled linens and requires unreasonable amount of time to initiate any/all movement d/t c/o pain. Plan to continue alternating Co-tx and OT/PT sessions in order to progress and problem solve functional mobility/transfers and decrease burden of care.   Total Session Time   Timed Code Treatment Minutes 45   Total Session Time (sum of timed and untimed services) 45   Post Acute Settings Only   What unit is patient on? TCU

## 2023-06-13 NOTE — PLAN OF CARE
Patient is alert and oriented x4. Able to make needs known to staff. Patient is continent of both bowel and bladder. Transfers assist-2 w/ liko lift. Patient denied Chest pain, SOB, new onset cough and N&V. Diet: Regular diet with fair appetite. Pain managed with PRN acetaminophen, robaxin, and oxycodone which was somewhat effective. Declined gull skin assessment. Call-light within reach. Continue with POC.    Vital signs: T: 97.4,  B/P: 148/64,  P: 74,  R: 16,  SpO2: 93 %     Patient does not have new respiratory symptoms.  Patient does not have new sore throat.  Patient does not have a fever greater than 99.5.

## 2023-06-14 NOTE — PLAN OF CARE
Goal Outcome Evaluation:    VS: Temp: 97.5  F (36.4  C) Temp src: Oral BP: 123/54 Pulse: 70   Resp: 16 SpO2: 94 % O2 Device: None (Room air)       O2: >90% on RA   Output: Voiding spontaneously without difficulties   Last BM: 6/10/23   Activity: Assist of 2   Skin: Visible skin intact, pt declines full skin assessment   Pain: Denies pain   CMS: Pt A/O x4   Dressing: Left hip incision DEEP, dressing intact   Diet: regular   LDA: 1 left, lateral hip bulb   Equipment: IV pole, personal belongings and call light is within reach   Plan: Continue with POC   Additional Info:
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
## 2023-06-14 NOTE — PROGRESS NOTES
Brief Medicine Note    Contacted by nursing regarding confusion, hypoxia.     Today's vital signs, medications, and nursing notes were reviewed.    Laboratory and Imaging studies reviewed in the results review section of Epic.            /51 (BP Location: Right arm, Patient Position: Semi-Fuentes's, Cuff Size: Adult Regular)   Pulse 77   Temp (!) 96.6  F (35.9  C) (Oral)   Resp 16   Wt 117.4 kg (258 lb 13.1 oz)   SpO2 97%   BMI 37.14 kg/m    General: A&O. NAD.   Pulmonary: Lungs CTA    A/P:    Confusion  Intermittent episodes of confusion, mostly morning.  Originally thought to be due to large amount of opiates.  Opiates since decreased but confusion ongoing.  Patient easily reoriented to place and situation.  High risk for delirium with prolonged hospital and TCU stay.  Encouraged patient to work with therapies and sit up in the chair for meals.  Patient states he will if his pain is better controlled.  -increase oxy dosing to 5-7.5 mg and watch closely  -delirium protocol    Hypoxia  Oxygen saturations in low 80s this morning.  Requiring supplemental oxygen nasal cannula at 2 L.  Patient denies shortness of breath.  Possible concern for infection with hypoxia and confusion.   -chest XR  -PCL, CBC, BNP, CMP, CRP, blood cultures  -EKG  -incentive spirometry Q2h JOSEPH Tesfaye CNP  Internal Medicine [facility] Hospitalist  Page job code [id number] (3B), [phone] (3A), or [phone] ([facility] and )  Text paging via [employer] is appreciated  June 14, 2023

## 2023-06-14 NOTE — PROGRESS NOTES
06/14/23 1044   Appointment Info   Signing Clinician's Name / Credentials (OT) REINIER Stover   Appointment Canceled Reason (OT) Medical status;Pain;Fatigue   Appointment Cancel Comments (OT) OT: Checked in w/ patient prior to scheduled OT session to prepare plan. Pt now on O2 via NC, reports has been unable to tolerate any movement, and is icing various body parts. Pt reports hullucinations. Labs also being drawn d/t worsening status and hallucinations. Unable to participate in meaningful skilled OT at this time.

## 2023-06-14 NOTE — CONSULTS
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  Postop course complicated by profound deconditioning.  He was admitted to TCU initially on 12/23/2022 for physical rehabilitation, however therapies no longer worked with patient after several months of an in bed therapy and his refusals to mobilize.  Underwent biopsy on 4/7/2023 without evidence of infection.  He was transferred back to [facility] for left total hip arthroplasty and reimplantation on 5/26/23 with Dr. Meyers.  Again, postop course complicated by severe deconditioning.  He was readmitted to TCU on 6/2/2023 for ongoing rehabilitation.    Assessment:      Areas visualized during today's visit: Focused:    Wound location: left hip  Last photo: none needed  Wound due to: Surgical Wound  Wound history/plan of care: Surgical dressing removed by RN. Intact incision with no obvious signs of infection. GUERRERO no drainage around tube at time of assessment. Per Cindy RN was draining serosanguinous around drain. Skin remained intact. Recommend covering incision until dried drainage comes off and for protection. Continue unit based drain cares.   Pt has histpry of refusing turns and repositioning. Encouraged him to allow staff to assist him to turn and reposition.       Treatment Plan:     Left hip wound(s): Daily    Cleanse with microklenz and pat dry  Apply primipore dressing.     Orders: Written    RECOMMEND PRIMARY TEAM ORDER: None, at this time  Education provided: importance of repositioning, plan of care and wound  progress  Discussed plan of care with: Patient and Nurse  [facility] nurse follow-up plan: signing off  Notify WOC if wound(s) deteriorate.  Nursing to notify the Provider(s) and re-consult the [facility] Nurse if new skin concern.    DATA:     Current support surface: Standard  Standard gel/foam mattress (IsoFlex, Atmos air, etc)  BMI: Body mass index is 37.14 kg/m .   Active diet order: Orders Placed This Encounter      Regular Diet Adult     Output: I/O last 3 completed shifts:  In: 120 [P.O.:120]  Out: 150 [Urine:100; Drains:50]     Labs:   Recent Labs   Lab 06/14/23  1010   HGB 7.2*   WBC 14.1*     Pressure injury risk assessment:   Sensory Perception: 3-->slightly limited  Moisture: 3-->occasionally moist  Activity: 2-->chairfast  Mobility: 2-->very limited  Nutrition: 3-->adequate  Friction and Shear: 2-->potential problem  Rafael Score: 15    Belén Le RN CWOCN  Pager no longer is use, please contact through [place] group: [facility] Nurse  Dept. Office Number: *[phone]

## 2023-06-15 NOTE — PLAN OF CARE
Goal Outcome Evaluation:    Patient is alert and oriented x 4 with intermittent confusion. He is able to make his needs known. Pt denied SOB and chest pain.  Pt is regular diet, and able to swallow without difficulty.Takes med whole with thin liquid. Pt is PWB to E. Assist of 2 with liko lift for transfer. He is continent of bowel and incontinent of bladder with urinal within reach at bedside. Pt requested PRN Oxycodone, and Tylenol for left hip pain x 1. GUERRERO drain is intact with 30 ml reddish-pink secretions emptied from bulb. Incision site is intact. Noted some redness, tenderness, skin peeling-off from pt's buttocks and redness in groin area. Performed caridad-care and applied barrier cream. Continue to monitor and care per plan of care. Family visited today.        Patient's most recent vital signs are:     Vital signs:  BP: 131/42  Temp: 96  HR: 71  RR: 16  SpO2: 95 %     Patient does not have new respiratory symptoms.  Patient does not have new sore throat.  Patient does not have a fever greater than 99.5.

## 2023-06-15 NOTE — PROGRESS NOTES
"   06/15/23 1116   Appointment Info   Signing Clinician's Name / Credentials (PT) Azeb Quinonez DPMATT   Appointment Canceled Reason (PT) Unarousable;Medical status   Appointment Cancel Comments (PT) spent 15 min trying to arouse pt, he would arouse to voice but only for a few seconds. Pt encouarged multiple times to sit EOB but says he can't. When asked why he doesn't have an answer. Pt removes O2 and when PT tries to reapply he says \"no, I'm going home\". Pt unable to state where he is. Pt falls back quickly and demonstrates twitching in both UE and LE. Notified nursing and provider.   Rehab Comments (PT) PT: - 45 min       "

## 2023-06-15 NOTE — CONSULTS
General ID Service: Initial Consultation     Patient:  Waldo Bustos, Date of birth 1950, Medical record number [id number]  Date of Visit:  Bernadette 15, 2023  Consult Requested by: Nii Brink MD         Assessment and Recommendations:   ID Problem List:  1. GPC bacteremia (MRSA per Verigene)  2. Chronic PJI  -Multiple surgeries, most recent being revision left THE on 5/26/23  3. DM2  4. RA  5. Venous insufficiency    Recommendations:  1. Continue vancomycin  2. Repeat blood cultures tomorrow AM  3. Would get updated TTE (last on 5/31 WNL)  4. Would get imaging of his hip given drainage (U/S vs CT) to look for abscess.    Discussion:  71yo M with history of chronic left hip PJI, most recently s/p revision left THE on 5/26, who has been in TCU since discharge on 6/2. Now with GPC (MRSA per Verigene) bacteremia, worsening intermittent confusion.    Recommend continuing vancomycin given likely MRSA. Suspect wound as our source given reports of purulent drainage. Would try to get imaging of the hip to evaluate for abscess (U/S vs CT). Repeat blood cultures tomorrow morning to document clearance with appropriate abx therapy. Would also repeat TTE to rule-out endocarditis (last TTE on 5/31 predates bacteremia).    ID will continue to follow.    Bartolome Mercado MD  Division of Infectious Diseases and International Medicine  P: [phone]       History of Present Illness:     71yo M with h/o left BELLA c/b chronic PJI with sinus tract (detailed below), prior iliopsoas abscess (April 2022) a/w PJI, DM2, GERD, JAIR, RA, venous insufficiency, OA who presented on 5/26/23 for planned revision left BELLA. ID consulted today for gram positive bacteremia, fever, and confusion.    Pt has initial left BELLA 3/7/2018. Since then, the timeline of his infections/treatments includes:  - 1/23/2019 - revision left total hip arthroplasty. Intra-op cultures grew Cutibacterium acnes, treated with 4 weeks of Ceftriaxone  - 2/2021 - L hip  aspiration   - 4/6/2022 - iliopsoas abscess aspirated. C acnes. Treated with several months of amoxicillin  - 4/27/2022 - drain placement. C acnes  - 11/14/22 - left hip aspirate 11/14/22 - 3+ Proteus mirabilis , 4+ Porphyromonas sp.   - 11/22/22 - Explantation of Chronic Infected Left Total Hip Arthroplasty, Extended Trochanteric Osteotomy with Extensive Debridement and Reconstruction Using Antibiotic Cement Spacer  - 11/22/22 - intra-op culture from left hip tissues with Proteus mirabilis, Finegoldia magna (both pan-S)    His most recent antibiotic treatment was 6 weeks of ciprofloxacin and metronidazole (preceded by one week of piperacillin-tazobactam), which he completed in early January 2023. He was seen in ID clinic on 12/28/22 prior to the end of this therapy, at which time he was doing well (no hardware in place at that time) and was planned for a 1-2 month abx-free period and re-evaluation by orthopedics. They appropriately recommended (on 1/9/23) an 8-week antibiotic holiday followed by joint aspiration to determine when to perform a 2-stage revision BELLA. Aspiration was attempted in early March without any fluid able to be drained, so decision was made to perform percutaneous biopsy of the left hip synovium for culture. This was performed on 4/7, all cultures negative. He was then planned and admitted for left revision BELLA on 5/26/23. Intra-operative cultures showed no growth. His post-operative course was largely unremarkable apart from an episode of self-resolving chest pain (cardiology consulted, TTE normal, planned for coronary CTA as an oupatient). He was discharged to TCU on 6/2/23 with planned orthopedic follow-up in clinic on 6/16/23.    Pt's stay in TCU was uneventful until 6/14 when he was noted to have worsening intermittent episodes of confusion, thought to be due to opiates but with no improvement after opiates decreased. WBC was 14, , procalcitonin 0.30. Blood cultures were collected  6/14, now growing GPC (MRSA per Verigene) in 4 of 4 bottles. Repeat blood cultures were collected today. He was started on vancomycin on 6/15 after blood cultures demonstrated growth. Per TCU exam notable for purulent drainage around left hip drain site. ID consulted today for GPC bacteremia.         Review of Systems:   Full 10 point ROS obtained, pertinent positives and negatives as above.       Past Medical History:     Past Medical History:   Diagnosis Date     Arthritis 5 years ago     Chronic osteoarthritis Not sure     Diabetes (H) 10-12 years ago     DVT (deep venous thrombosis) (H)      Dyslipidemia      Gastroesophageal reflux disease      History of blood transfusion      Iliopsoas abscess (H)      Infection of prosthetic hip joint (H)      JAIR (obstructive sleep apnea)      Pulmonary embolism (H)      RA (rheumatoid arthritis) (H) Not sure     Reduced vision 2-3 years ago    Cataract Surgery on both eyes     Venous insufficiency      Past Surgical History:   Procedure Laterality Date     ARTHROPLASTY REVISION HIP Left 5/26/2023    Procedure: Revision Left Total Hip Arthroplasty;  Surgeon: Rom Meyers MD;  Location: [facility] OR     ASPIRATION NEEDLE HIP Left 4/7/2023    Procedure: ARTHROCENTESIS, LEFT HIP;  Surgeon: Rom Meyers MD;  Location: [facility] OR     Cataract       COLONOSCOPY       EGD       IR FINE NEEDLE ASPIRATION W ULTRASOUND  3/6/2023     IR IVC FILTER PLACEMENT      removed     IRRIGATION AND DEBRIDEMENT HIP, PLACE ANTIBIOTIC CEMENT BEADS / SPACER Left 11/22/2022    Procedure: and Reconstruction Using G 20 Prosthetic Antibiotic Cement Spacer;  Surgeon: Rom Meyers MD;  Location: [facility]     REMOVE ANTIBIOTIC CEMENT BEADS / SPACER HIP Left 5/26/2023    Procedure: Explantation of Left Hip Antibiotic Spacer;  Surgeon: Rom Meyers MD;  Location: [facility]     REMOVE HARDWARE ARTHROPLASTY HIP Left 11/22/2022    Procedure: Explantation of Chronic  Infected Left Total Hip Arthroplasty, Extended  Trochanteric Osteotomy with Extensive Debridement;  Surgeon: Rom Meyers MD;  Location: [facility]     SYNOVIAL BIOPSY HIP Left 4/7/2023    Procedure: BIOPSY, SYNOVIUM, LEFT  HIP, percutaneous;  Surgeon: Rom Meyers MD;  Location: [facility] OR     [facility] PELVIS/HIP JOINT SURGERY UNLISTED       [facility] STOMACH SURGERY PROCEDURE UNLISTED  1954    2 Hernia surgeries as a child       Allergies:      Allergies   Allergen Reactions     Sulfa Antibiotics      Other reaction(s): *Unknown - Childhood Rxn     Tizanidine Other (See Comments)     Frequent urination; causes drowsiness, and dry mouth            Current Antimicrobials:     Vancomycin       Family History:     Family History   Adopted: Yes   Problem Relation Age of Onset     Diabetes No family hx of      Rheumatoid Arthritis No family hx of      Low Back Problems No family hx of      Unknown/Adopted No family hx of         Social History:     Social History     Socioeconomic History     Marital status:      Spouse name: Not on file     Number of children: Not on file     Years of education: Not on file     Highest education level: Not on file   Occupational History     Not on file   Tobacco Use     Smoking status: Never     Smokeless tobacco: Never   Vaping Use     Vaping status: Not on file   Substance and Sexual Activity     Alcohol use: No     Drug use: No     Sexual activity: Never   Other Topics Concern     Not on file   Social History Narrative     Not on file     Social Determinants of Health     Financial Resource Strain: Not on file   Food Insecurity: Not on file   Transportation Needs: Not on file   Physical Activity: Not on file   Stress: Not on file   Social Connections: Not on file   Intimate Partner Violence: Not on file   Housing Stability: Not on file          Physical Exam:   Ranges forvital signs:  Temp:  [96  F (35.6  C)-97.8  F (36.6  C)] 97.2  F (36.2  C)  Pulse:  [71-98] 86  Resp:  [12-18] 12  BP: (113-133)/(42-58) 113/51  SpO2:  [93 %-97 %] 97  %  No intake or output data in the 24 hours ending 06/15/23 3754    Exam:  GENERAL:  well-developed, well-nourished, sitting in bed in no acute distress. Very somnolent, confused.  ENT:  Head is normocephalic, atraumatic. Oropharynx is moist without exudates or ulcers.  EYES:  Eyes have anicteric sclerae.    NECK:  Supple.  LUNGS:  Clear to auscultation.  CARDIOVASCULAR:  Regular rate and rhythm with no murmurs, gallops or rubs.  ABDOMEN:  Normal bowel sounds, soft, nontender.  EXT: Extremities warm and without edema. Left hip with GUERRERO drain in place, serosanguinous fluid in line and collection vessel.  SKIN:  No acute rashes. Dressing on/around surgical site with some dried yellowish discharge, no abrt erythema noted around dressing.  NEUROLOGIC:  Grossly nonfocal, though confused and somnolent         Laboratory Data:     Reviewed.  Pertinent for:    Microbiology:  Culture   Date Value Ref Range Status   06/14/2023 Positive on the 1st day of incubation (A)  Preliminary   06/14/2023 Gram positive cocci in clusters (AA)  Preliminary     Comment:     2 of 2 bottles   06/14/2023 Positive on the 1st day of incubation (A)  Preliminary   06/14/2023 Gram positive cocci in clusters (AA)  Preliminary     Comment:     2 of 2 bottles     05/26/2023 No anaerobic organisms isolated  Final   05/26/2023 No anaerobic organisms isolated  Final   05/26/2023 No anaerobic organisms isolated  Final   05/26/2023 No anaerobic organisms isolated  Final   05/26/2023 No Growth  Final   05/26/2023 No Growth  Final   05/26/2023 No Growth  Final   05/26/2023 No Growth  Final   05/26/2023 No anaerobic organisms isolated  Final   05/26/2023 No Growth  Final   04/07/2023 No anaerobic organisms isolated  Final   04/07/2023 No Growth  Final   04/07/2023 No anaerobic organisms isolated  Final   04/07/2023 No Growth  Final   04/07/2023 No anaerobic organisms isolated  Final   04/07/2023 No Growth  Final   04/07/2023 No anaerobic organisms isolated   Final   04/07/2023 No Growth  Final   04/07/2023 No anaerobic organisms isolated  Final   04/07/2023 No Growth  Final   04/07/2023 No anaerobic organisms isolated  Final   04/07/2023 No Growth  Final   11/22/2022 4+ Finegoldia magna (A)  Final     Comment:     Susceptibilities done on previous cultures   11/22/2022 No Growth  Final   11/22/2022 1+ Proteus mirabilis (A)  Final     Comment:     Susceptibilities done on previous cultures   11/22/2022 1+ Finegoldia magna (A)  Final     Comment:     Susceptibilities done on previous cultures   11/22/2022 No Growth  Final   11/22/2022 1+ Proteus mirabilis (A)  Final   11/22/2022 No anaerobic organisms isolated  Final   11/22/2022 No anaerobic organisms isolated  Final   11/22/2022 No anaerobic organisms isolated  Final   11/22/2022 No Growth  Final   11/22/2022 No Growth  Final   11/22/2022 No Growth  Final   11/22/2022 Isolated in broth only Proteus mirabilis (A)  Final     Comment:     On day 1 of incubationSusceptibilities done on previous cultures   11/22/2022 Isolated in broth only Proteus mirabilis (A)  Final     Comment:     On day 1 of incubationSusceptibilities done on previous cultures   11/22/2022 1+ Proteus mirabilis (A)  Final   11/22/2022 No anaerobic organisms isolated  Final   11/22/2022 No Growth  Final   11/22/2022 1+ Proteus mirabilis (A)  Final     Comment:     Susceptibilities done on previous cultures   11/14/2022 4+ Porphyromonas species (A)  Final     Comment:     Beta Lactamase Positive  Identification obtained by MALDI-TOF mass spectrometry research use only database. Test characteristics determined and verified by the [facility].   11/14/2022 1+ Finegoldia magna (A)  Corrected   11/14/2022 3+ Proteus mirabilis (A)  Final   11/14/2022 No Growth  Final     Metabolic Studies       Recent Labs   Lab Test 06/15/23  1259 06/15/23  0829 06/14/23  1817 06/14/23  0742 06/14/23  0651 06/13/23  1706 06/13/23  0746  06/13/23  0603 06/10/23  1809 06/10/23  1444 05/31/23  0805 05/31/23  0630 05/30/23  0929 05/30/23  0643 05/29/23  0812 05/29/23  0512 05/28/23  0822 05/28/23  0538 03/21/23  0810 03/20/23  1919 03/08/23  1115 03/06/23  0733 01/10/23  1710 01/10/23  1105   NA  --   --   --   --  140  --   --   --   --  139  --  139  --  138  --  138  --  135*   < >  --   --  138   < >  --    POTASSIUM  --   --   --   --  4.4  --   --   --   --  4.1  --  4.0  --  4.0  --  4.3  --  4.5   < >  --   --  4.2   < >  --    CHLORIDE  --   --   --   --  102  --   --   --   --  103  --  104  --  104  --  104  --  101   < >  --   --  104   < >  --    CO2  --   --   --   --  23  --   --   --   --  25  --  25  --  25  --  25  --  25   < >  --   --  25   < >  --    ANIONGAP  --   --   --   --  15  --   --   --   --  11  --  10  --  9  --  9  --  9   < >  --   --  9   < >  --    BUN  --   --   --   --  24.5*  --   --   --   --  17.2  --  26.0*  --  28.5*  --  33.6*  --  41.0*   < >  --   --  42.1*   < >  --    CR  --   --   --   --  1.15  --   --   --   --  1.10  --  0.90  --  0.94  --  1.09  --  1.19*   < >  --   --  1.00   < >  --    GFRESTIMATED  --   --   --   --  68  --   --   --   --  72  --  >90  --  87  --  73  --  65   < >  --   --  80   < >  --    * 183* 140* 151* 146* 120*   < >  --    < > 119*   < > 129*   < > 135*   < > 147*   < > 121*   < >  --    < > 114*   < >  --    A1C  --   --   --   --   --   --   --   --   --   --   --   --   --   --   --   --   --   --   --   --   --  5.8*  --   --    MAIKOL  --   --   --   --  8.2*  --   --   --   --  8.3*  --  8.2*  --  8.0*  --  7.8*  --  7.9*   < >  --   --  9.2   < >  --    MAG  --   --   --   --  1.6*  --   --  1.6*   < > 1.3*  --   --   --   --   --   --   --   --   --   --   --   --   --   --    LACT  --   --   --   --   --   --   --   --   --   --   --   --   --   --   --   --   --   --   --  1.3  --   --   --  0.9    < > = values in this interval not displayed.     Hepatic  Studies    Recent Labs   Lab Test 06/14/23  0651 05/31/23  0630 05/26/23  0753 03/30/23  1054 12/04/22  1035 11/23/22  0612   BILITOTAL 0.2 0.2 0.3 0.2 0.3 0.2   ALKPHOS 110 74 92 80 74 56   ALBUMIN 2.8* 3.2* 3.7 3.7 2.1* 2.4*   AST 16 14 10 12 7 48*   ALT 8 6* 6* 13 9 19     Pancreatitis testing    Recent Labs   Lab Test 05/31/23  0630 01/26/22  1346 08/19/20  0850   TRIG 142 170* 192*     Hematology Studies      Recent Labs   Lab Test 06/14/23  1010 05/31/23  0630 05/30/23  1532 05/28/23  0538 05/27/23  0714 05/26/23  0753 05/25/23  0644 05/15/23  0526   WBC 14.1* 8.4 6.3  --   --  9.5 9.6 8.7   HGB 7.2* 7.6* 7.9* 8.8* 9.9* 10.5* 10.4* 10.4*   HCT 23.1* 24.5* 23.9*  --   --  32.9* 32.5* 32.2*    297  --   --   --  309 295 312          Latest Ref Rng & Units 6/14/2023    10:10 AM 6/14/2023     6:51 AM 6/10/2023     2:44 PM 5/31/2023     6:30 AM 5/30/2023     3:32 PM   Transplant Immunosuppression Labs   Creat 0.67 - 1.17 mg/dL  1.15   1.10   0.90      Urea Nitrogen 8.0 - 23.0 mg/dL  24.5   17.2   26.0      WBC 4.0 - 11.0 10e3/uL 14.1     8.4   6.3     Neutrophil % 72     48   45            Imaging:   XR Chest Port 1 View  Result Date: 6/14/2023  IMPRESSION: Negative portable view. If further detail is needed comment consider upright PA and lateral examination when clinical condition permits. TAE MORRIS MD   SYSTEM ID:  [id number]    XR Abdomen 1 View  Result Date: 6/10/2023  IMPRESSION: Nonobstructive bowel gas pattern. Large stool burden. I have personally reviewed the examination and initial interpretation and I agree with the findings. JONNATHAN COLEMAN MD   SYSTEM ID:  [id number]

## 2023-06-15 NOTE — PROGRESS NOTES
CLINICAL NUTRITION SERVICES - REASSESSMENT NOTE     Nutrition Prescription    RECOMMENDATIONS FOR MDs/PROVIDERS TO ORDER:  None    Malnutrition Status:    Severe malnutrition in the context of acute on chronic illness    Recommendations already ordered by Registered Dietitian (RD):  None today    Future/Additional Recommendations:  Monitor labs, intakes, and weight trends.  Monitor need for nutrition support.     EVALUATION OF THE PROGRESS TOWARD GOALS   Diet: NPO for Regular  Intake/[place]: mostly 100% of documented meals.    Pt ordering (on average) 673 kcal and 28 g protein per day per [employer]. With documented intakes, pt is likely meeting <50% minimum energy and protein needs.     NEW FINDINGS/REVIEW OF SYSTEMS    General: Pt now with bacteremia, yesterday difficult to arouse, needing oxygen but taking off oxygen provided. Plan for repeat  Blood cultures and TTE this morning. Currently NPO but taking sips of water.     Weights: Pt with limited weight history prior to admission,with  81 lb (25%) weight loss over 6 months during admission, now with 15 lb (6%) weight gain in 1 month since previous admission.   Wt Readings from Last Encounters:   06/06/23 117.4 kg (258 lb 13.1 oz)   05/19/23 110.4 kg (243 lb 4.8 oz)   03/23/23 117 kg (257 lb 14.4 oz)   01/09/23 119.7 kg (264 lb)   11/22/22 147.1 kg (324 lb 4.8 oz)   11/14/22 142.9 kg (315 lb)   11/09/22 146.8 kg (323 lb 9.6 oz)   05/19/22 136.1 kg (300 lb)   07/03/18 148.9 kg (328 lb 4.8 oz)     Skin: hip surgical incision and drain. Groin pressure injury, RN unable to assess due to pt refusal.      Labs reviewed   05/31/23 06:30 06/10/23 14:44 06/11/23 01:04 06/14/23 06:51   Sodium 139 139  140   Potassium 4.0 4.1  4.4   Urea Nitrogen 26.0 (H) 17.2  24.5 (H)   Creatinine 0.90 1.10  1.15   Magnesium  1.3 (L) 1.4 (L) 1.6 (L)   CRP Inflammation    256.16 (H)   Glucose 129 (H) 119 (H)  146 (H)      Medications reviewed: colace, culturell, mag ox, miralax,  senna    MALNUTRITION  % Intake: </=75% for >/= 1 month (severe)  % Weight Loss: > 10% in 6 months (severe)  Subcutaneous Fat Loss: Upper arm:  Moderate  Muscle Loss: Thoracic region (clavicle, acromium bone, deltoid, trapezius, pectoral):  Moderate, Upper arm (bicep, tricep):  Moderate-severe and Posterior calf:  Moderate-severe  Fluid Accumulation/Edema: Does not meet criteria  Malnutrition Diagnosis: Severe malnutrition in the context of acute on chronic illness    Previous Goals   Patient to consume % of nutritionally adequate meal trays TID, or the equivalent with supplements/snacks.  Evaluation: Not met    Previous Nutrition Diagnosis  Inadequate oral intake related to poor appetite, pt dislike of hospital food as evidenced by patient report.    Evaluation: No change    CURRENT NUTRITION DIAGNOSIS  Inadequate oral intake related to poor appetite, pt dislike of hospital food as evidenced by patient report and pt ordering 1-2 meals daily, likely meeting <50% of needs.     INTERVENTIONS  Implementation  None today    Goals  Patient to consume % of nutritionally adequate meal trays TID, or the equivalent with supplements/snacks.    Monitoring/Evaluation  Progress toward goals will be monitored and evaluated per protocol.    Dee Alanis MS, RDN, LDN  RD pager: [phone]  [facility] Weekend/Holiday Pager: [phone]

## 2023-06-15 NOTE — PLAN OF CARE
RN Received Critical lab result of Blood culture done yesterday, 6/14 at 1:58 PM: GRAM + COCCI in clusters. Assigned RN and Charge RN informed. Did not paged on call to report at this time.  Result is prelimary.   Assigned RN to leave E-sticky note to providers.

## 2023-06-15 NOTE — PLAN OF CARE
"Goal Outcome Evaluation:        Pt A&O. Somnolent and sleeps between cares. Slow to respond to questions. Needed cueing to take medications. Oxygen in low 80s this morning, 2LPM via NC placed with sats now in mid 90s. Oxycodone 7.5mg given at 0848 for neck pain. Contact precautions initiated for MRSA. PIV placed by RN flyer. Vancomycin IV ABX infused easily.  and 196 this shift.     1145 seen by PT and called RN into room. Pt disoriented to place, time, person, and situation. Pt asking, \"Who am I?  where am I?\"  Vital signs:  Temp: 97.2  F (36.2  C) Temp src: Axillary BP: 113/51 Pulse: 86   Resp: 12 SpO2: 97 % O2 Device: Nasal cannula Oxygen Delivery: 2 LPM   Difficult to arouse, unable to follow commands to complete neuro check. Non- compliant with oxygen administration, pt continued to take off NC multiple times. CPAP and oxygen mask offered, pt refused.  Twitching noted in Bilateral upper extremities with movement. Appears pale, warm to touch. Assessed by provider at bedside. STAT CT with contrast ordered. Pt sent to CT on Oxygen 3LPM and portable pulse oximeter- sats in 90s.   -Pt refused CT scan upon arrival and was sent back to TCU. Unable to collect UA as pt  Refused straight cath, pt unable to void in urinal. ABG completed by RT- results unremarkable.    Regular diet, poor appetite. Takes pills whole with thin liquids. Liko lift to transfer. GUERRERO drain intact with dark red output. L hip dressing DCI. Continent of bowel, incontinent of bladder. LBM 6/10. Pt resting in bed, call light within reach.         Patient's most recent vital signs are:     Vital signs:  BP: 113/51  Temp: 97.2  HR: 86  RR: 12  SpO2: 97 %     Patient does not have new respiratory symptoms.  Patient does not have new sore throat.  Patient does not have a fever greater than 99.5.                          "

## 2023-06-15 NOTE — PROGRESS NOTES
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  Postop course complicated by profound deconditioning.  He was admitted to TCU initially on 12/23/2022 for physical rehabilitation, however therapies no longer worked with patient after several months of an in bed therapy and his refusals to mobilize.  Underwent biopsy on 4/7/2023 without evidence of infection.  He was transferred back to [facility] for left total hip arthroplasty and reimplantation on 5/26/23 with Dr. Meyers.  Again, postop course complicated by severe deconditioning.  He was readmitted to TCU on 6/2/2023 for ongoing rehabilitation.    #Staphylococcus aureus bacteremia  #Leukocytosis  Confusion as below and in addition, pt hypoxic in the low 80s and requiring 2 liters O2 NC. Began infectious workup. Chest XR normal. .16, WBC 14.1, PCL 0.30. BNP 2971- normal ECHO 5/31/23. No s/s of fluid overload. Hip surgery was 5/26/23. Purulent drainage around drain site, incision c/d/i no redness. Denies increase in pain. Blood cultures 2 of 2 bottles positive gram + cocci clusters, staph aureus. Spoke with ID and consult placed.   -blood cultures 6/14 (+), 6/15-pending  -pharmacy to dose vancomycin  -PIV placed  -Infectious disease consult- awaiting recs  -CMP, CBC in a.m.     #Confusion  #Altered mental status  Has had intermittent confusion since Monday morning. Stated he thought there were multiple people in his room overnight. Originally was easily reoriented. Decreased oxycodone dosing from 5-10 mg to 2.5 -5 mg with improvement in mental status, but with poor  pain control and pt refusing therapies, increased range to 5-7.5 mg every 4 hours. Last dose 7.5 mg this a.m. around 0900 and previous dose of 5 mg around 2200 last dagmar. Respirations 12-13. Hx of JAIR with non compliance with CPAP. Blood sugars normal. ABG WDL  -Head CT (patient not cooperating, raising hands over head so unable to complete)  -add on Vitamin B12  -UA/UC- not allowing staff to straight cath    Addendum: pt becoming more oriented in the afternoon. So far testing normal. Alterations likely due to acute infection.      # S/p left hip explantation of left hip antibiotic spacer and revision of left total hip arthroplasty (5/26/2023)- surgery by Dr. Rahman's team.  Developed left foot drop postoperatively.  AFOs ordered but patient has been declining to use these.  OR cultures no growth to date.  Pain has overall been well controlled.   -Activity: 50% PWB LLE with posterior hip precautions [id number]  -AFO ordered for foot drop  -PT/OT  -Knee to be kept even in a foam leg elevator to relax sciatic nerve  -Pain: Scheduled robaxin 4 times a day, APAP PRN                 -oxycodone reduced to 5-7.5 mg (from 5-10 mg) 6/13 for visual hallucinations and vivid dreams  -follow up with Dr Meyers in clinic 6/26/2023     #Type 2 diabetes mellitus  Last hemoglobin A1c 5.8% on 3/6/2023.  On glipizide, metformin, Actos, and Victoza prior to hospitalizations and surgery.  -Continue medium sliding scale NovoLog  -Continue holding PTA medications for now  -POCT BG 3 times daily before meals and at bedtime  -Hypoglycemia protocol in place  Recent Labs   Lab 06/15/23  0829 06/14/23  1817 06/14/23  0742 06/14/23  0651 06/13/23  1706 06/13/23  0746   * 140* 151* 146* 120* 139*       #Constipation  Abdomen x-ray 6/10 showed nonobstructive bowel gas pattern and large stool burden.  -Continue Colace 100 mg twice daily  -Continue senna 1 tablet twice daily  -Continue MiraLAX daily  -Consider decrease in amount of stool softeners  when patient off oxycodone     #Hypomagnesemia  -magnesium replacement protocol    #Severe malnutrition in the context of acute on chronic illness  Goal for patient is to consume % of meals TID. Goal is 1270-3795 kcals/daily. Protein needs- 100-126 grams/daily.   -RD following and appreciate recs  -regular diet  -Weekly weights  -I/Os     #Adjustment disorder with depressed mood  #Prolonged grief disorder  Patient has been with depressed mood in setting of ongoing health issues, as evidenced by lack of motivation to work with therapies during both TCU admissions.  Past history of passive suicidal statements.  Psychiatry saw patient during hospital admission on 5/31 due to lack of motivation with therapies..  Patient declined interest in starting any psychiatric medications.  Willing to engage with health psychology.   -Outpatient health psychology consult  -Continue to monitor for willingness to start antidepressant therapy     Stable and Resolved Issues:  #Hyperlipidemia-continue PTA Lipitor 40 mg daily  #JAIR-CPAP when sleeping  #Unprovoked DVT in left common femoral, deep and superficial femoral, popliteal, and posterior tibial veins, and pulmonary embolism-diagnosed in 2018.  Continue PTA apixaban 5 mg twice daily    Consulting Services: PT, OT, Ortho, Dietician      CODE: Full code  DVT: DOAC  Diet: Regular  Indications for Psychotropic Medications: NA  Vaccinations:   Disposition: TBD      Emperatriz Wright, CNP, APRN  Internal Medicine [facility] Hospitalist  [facility]  Pager [phone]    ________________________________________________________________    Subjective & Interval History:      Waldo Bustos is a 72 year old male at the TCU post hip surgery for skilled nursing and therapies. Confusion as above. Denies pain but is otherwise unable to participate in exam with confusion and lethargy.     Last 24 hour care team notes reviewed.   ROS: 4 point ROS (including Respiratory, CV, GI and ) was  performed and negative unless otherwise noted in HPI.     Medications: Reviewed in EPIC.    Physical Exam:    Blood pressure 133/58, pulse 98, temperature 97.7  F (36.5  C), temperature source Oral, resp. rate 18, weight 117.4 kg (258 lb 13.1 oz), SpO2 93 %.    GENERAL: Lethargic, oriented to person only- clearing this afternoon and able to tell us where he is. Disoriented to time and situation.   HEENT: Normocephalic, atraumatic. Anicteric sclera. Mucous membranes moist.   CV: RRR. S1, S2. No murmurs appreciated.   RESPIRATORY: Effort normal on RA. Lungs CTAB with no wheezing, rales, or rhonchi.   GI: Abdomen soft and non distended, bowel sounds present x all 4 quadrants. No tenderness, rebound, or guarding.   NEUROLOGICAL: No focal deficits. Follows commands.  Strength weak in upper and lower extremities.   MUSCULOSKELETAL: No joint swelling or tenderness. Moves all extremities.   EXTREMITIES: No gross deformities. No peripheral edema.   SKIN: Grossly warm, dry, and intact. No jaundice. No rashes. Ecchymotic, surgical incision      Lines/Tubes/Drains:      PIV   GUERRERO drain       ROUTINE IP LABS (Last four results)  CMP   Recent Labs   Lab 06/14/23  1817 06/14/23  0742 06/14/23  0651 06/13/23  1706 06/13/23  0746 06/13/23  0603 06/12/23  0807 06/12/23  0008 06/11/23  0800 06/11/23  0104 06/10/23  1809 06/10/23  1444   NA  --   --  140  --   --   --   --   --   --   --   --  139   POTASSIUM  --   --  4.4  --   --   --   --   --   --   --   --  4.1   CHLORIDE  --   --  102  --   --   --   --   --   --   --   --  103   CO2  --   --  23  --   --   --   --   --   --   --   --  25   ANIONGAP  --   --  15  --   --   --   --   --   --   --   --  11   * 151* 146* 120*   < >  --    < >  --    < >  --    < > 119*   BUN  --   --  24.5*  --   --   --   --   --   --   --   --  17.2   CR  --   --  1.15  --   --   --   --   --   --   --   --  1.10   MAIKOL  --   --  8.2*  --   --   --   --   --   --   --   --  8.3*   MAG  --    --  1.6*  --   --  1.6*  --  1.5*  --  1.4*  --  1.3*   PROTTOTAL  --   --  6.3*  --   --   --   --   --   --   --   --   --    ALBUMIN  --   --  2.8*  --   --   --   --   --   --   --   --   --    BILITOTAL  --   --  0.2  --   --   --   --   --   --   --   --   --    ALKPHOS  --   --  110  --   --   --   --   --   --   --   --   --    AST  --   --  16  --   --   --   --   --   --   --   --   --    ALT  --   --  8  --   --   --   --   --   --   --   --   --     < > = values in this interval not displayed.     CBC   Recent Labs   Lab 23  1010   WBC 14.1*   RBC 2.40*   HGB 7.2*   HCT 23.1*   MCV 96   MCH 30.0   MCHC 31.2*   RDW 13.9        INR No lab results found in last 7 days.    OTHER:  [facility]'s         Medical Decision Makin MINUTES SPENT BY ME on the date of service doing chart review, history, exam, documentation & further activities per the note.

## 2023-06-15 NOTE — PLAN OF CARE
Goal Outcome Evaluation:    No acute concerns or changes during this shift, critical lab results received (GRAM + COCCI in clusters) note left for provider. Patient denied pain, no SOB or cough noted.  Patient slept through the night, continue with POC.      Patient's most recent vitals:  /42 (BP Location: Right arm)   Pulse 71   Temp (!) 96  F (35.6  C) (Oral)   Resp 16   Wt 117.4 kg (258 lb 13.1 oz)   SpO2 95%   BMI 37.14 kg/m

## 2023-06-15 NOTE — TELEPHONE ENCOUNTER
- A call was placed to the patient.     - Left a voicemail for patient rescheduling his appointment to earlier in the day. Called his TCU to tell them as well. They recorded the change in time and will arrange transport back to the TCU.     - Call back number to clinic was given and patient was told to call if they had an further questions.

## 2023-06-15 NOTE — PROGRESS NOTES
06/15/23 1116   Appointment Info   Signing Clinician's Name / Credentials (PT) Azeb Quinonez DPT   Appointment Canceled Reason (PT) Unarousable;Medical status   Appointment Cancel Comments (PT) Notified nursing and provider.  Pt states he doesn't know where he is. Pt removes O2 and   Rehab Comments (PT) PT: - 45 min

## 2023-06-15 NOTE — PHARMACY-VANCOMYCIN DOSING SERVICE
Pharmacy Vancomycin Initial Note  Date of Service Bernadette 15, 2023  Patient's  1950  72 year old, male    Indication: Bacteremia    Current estimated CrCl = Estimated Creatinine Clearance: 75.7 mL/min (based on SCr of 1.15 mg/dL).    Creatinine for last 3 days  2023:  6:51 AM Creatinine 1.15 mg/dL    Recent Vancomycin Level(s) for last 3 days  No results found for requested labs within last 3 days.      Vancomycin IV Administrations (past 72 hours)      No vancomycin orders with administrations in past 72 hours.                Nephrotoxins and other renal medications (From now, onward)    Start     Dose/Rate Route Frequency Ordered Stop    06/15/23 0900  vancomycin (VANCOCIN) 2,000 mg in sodium chloride 0.9 % 500 mL intermittent infusion         2,000 mg  over 2 Hours Intravenous EVERY 24 HOURS 06/15/23 0834            Contrast Orders - past 72 hours (72h ago, onward)    None          [employer] Prediction of Planned Initial Vancomycin Regimen  Regimen: 2000 mg IV every 24 hours.  Start time: 08:46 on 06/15/2023  Exposure target: AUC24 (range)400-600 mg/L.hr   AUC24,ss: 507 mg/L.hr  Probability of AUC24 > 400: 76 %  Ctrough,ss: 14.5 mg/L  Probability of Ctrough,ss > 20: 23 %  Probability of nephrotoxicity (Lodise HAJA ): 10 %    Plan:  1. Start vancomycin  2000 mg IV q24h.   2. Vancomycin monitoring method: AUC  3. Vancomycin therapeutic monitoring goal: 400-600 mg*h/L  4. Pharmacy will check vancomycin levels as appropriate in 1-3 Days.    5. Serum creatinine levels will be ordered daily for the first week of therapy and at least twice weekly for subsequent weeks.      Natasha Guillen, [facility]  PharmD,BCPS  Bernadette 15, 2023

## 2023-06-16 NOTE — PLAN OF CARE
Goal Outcome Evaluation:    Alert, able to communicate and provide appropriate responses to questions. Left hip wound BRII, CDI, no drainage from incision site. Left hip GUERRERO drain dressing remains CDI, drained 2.5 ml/ serosanguineous output, left leg elevated with pillow per request. Left lower forearm PIV dressing changed, flushed/saline locked.   BG monitored, no sliding scale insulin given, patient refused to eat supper or snacks. VSS, comfortable on O2 via NC at 2LPM, said he hurts all over , PRN meds given at bedtime ( Tramadol , Tylenol, Robaxin).     Labs: Mg-1.7,  lab ordered to recheck level per RN managed replacement protocol for tomorrow .    Pending collection: UA/ UC sample , patient instructed to use urinal to pee,didn't call staff ,noted  urine on urinal but patient is unsure if it has been over 2 hours already since he peed, will handover to next shift RN to collect sample when able.         Patient's most recent vital signs are:     Vital signs:  BP: 133/52  Temp: 96.8  HR: 81  RR: 16  SpO2: 99 %     Patient does not have new respiratory symptoms.  Patient does not have new sore throat.  Patient does not have a fever greater than 99.5.

## 2023-06-16 NOTE — PROGRESS NOTES
06/16/23 4492   Appointment Info   Signing Clinician's Name / Credentials (PT) Azeb Quinonez DPT   Rehab Comments (PT) PT: 30 min co treat with OT   Therapeutic Activity   Therapeutic Activities: dynamic activities to improve functional performance Minutes (59435) 30   Symptoms Noted During/After Treatment Fatigue;Increased pain   Treatment Detail/Skilled Intervention PT: goal of session was sitting EOB or sitting in chair. Pt offered tilt table which he refused. He was then given choice for other two options with education on benefits of getting out of bed and in a different position. Pt states he has had a very bad day with different procedures in the morning. Pillow placed between legs and verbal cues to reach for side rail with L UE while reaching across body with R UE. Pt required multiple cues for arm placement with one tactile assist. Pt is very slow and deliberate with movement, does not get arm fully across and then states he really doesn't want to do this today and he feels like therapy is not giving him a choice. Pt goals reviewed with him from sean: wanting to go home, wanting to walk, wanting to do stairs. Pt asked if these are still the goals. He says yes but just not today. Education on moving and the longer he lies in bed the harder it will be to get moving. Pt requests no further treatment today. Does agree to make a plan for Sunday's session. Pt is agreeable to sit edge of bed and then have life vest placed for use with overhead lift to attempt to stand and bear weight. Handoff to be given to treating OT and PT on Sunday.   PT Discharge Planning   PT Plan PT: 50% WB in L LE, for SUNDAY 6/18 pt agreed to try liko lift with vest, located on windowsill in PT gym. Goal is to get pt to stand, keeping 50% WB on L LE. Talk to Macy Florence for more details if needed. If you do a chair transfer there is a high back sling also on PT gym windowsill.   PT Discharge Recommendation (DC Rec) Long term care  facility   PT Rationale for DC Rec discharge pending pt progress and ability to perform stairs. Pt needs to perform 7 steps in order to return home.   PT Brief overview of current status Dependent for transfers with ceiling lift (high back sling), max A x 2 for bed mobility   Total Session Time   Timed Code Treatment Minutes 30   Total Session Time (sum of timed and untimed services) 30   Post Acute Settings Only   What unit is patient on? TCU

## 2023-06-16 NOTE — PROGRESS NOTES
General ID Service: Follow Up Note      Patient:  Waldo Bustos, Date of birth 1950, Medical record number [id number]  Date of Visit:  June 16, 2023         Assessment and Recommendations:   Problem List:  1. MRSA bacteremia   -6/14 blood cultures: 4 of 4 bottles positive  -6/15 blood cultures: 4 of 4 bottles positive  -No lines/central access present  2. Chronic PJI  -Multiple surgeries, most recent being revision left THE on 5/26/23  -GUERRERO drain in place  3. DM2  4. RA  5. Venous insufficiency    Recommendations:  1. Continue vancomycin  2. Agree with plan for KEILA  3. Hip U/S performed today (couldn't tolerate CT), radiology read pending  4. Repeat blood cultures tomorrow AM   5. Will follow-up blood cultures ordered today    Discussion:  71yo M with history of chronic left hip PJI, most recently s/p revision left THE on 5/26, who has been in TCU since discharge on 6/2. Now with MRSA bacteremia, worsening intermittent confusion.     Recommend continuing vancomycin given likely MRSA. Suspect wound as our source given reports of purulent drainage. Ultrasound of hip performed today, awaiting read. Cultures continue to demonstrate growth of MRSA, though suspect this is lag effect (first vancomycin dose given 6/15 AM after that set of cultures was collected). Would continue daily blood cultures x 2 sets. No central access or other lines present, has GUERRERO drain in place on left hip with serosanguinous output and scant red-yellow drainage on dressing around drain site (other dressing along hip appears c/d/i). Cardiology recommended KEILA given poor windows on prior TTE (rather than repeating TTE), agree with this and will await results tomorrow.     ID will continue to follow. Dr. Radha Silva will be covering this service over the weekend, with Dr. Shelton Meredith picking up the service on Monday.    Bartolome Mercado MD  Division of Infectious Diseases and International Medicine  P: [phone]        Interval  History:     Seen and examined. Remains pretty somnolent and confused, but easily woken and answers questions in short sentences. Says hip is sore but denies other complaints.         Review of Systems:     Full 9 pt ROS obtained and negative unless noted above in assessment and interval history.         Current Antimicrobials     Vancomycin         Physical Exam:   Ranges for vital signs:  Temp:  [97.2  F (36.2  C)-97.8  F (36.6  C)] 97.6  F (36.4  C)  Pulse:  [76-88] 87  Resp:  [12-16] 16  BP: (113-131)/(40-58) 131/54  SpO2:  [96 %-99 %] 98 %    Intake/Output Summary (Last 24 hours) at 6/16/2023 1115  Last data filed at 6/15/2023 2239  Gross per 24 hour   Intake 50 ml   Output --   Net 50 ml     Exam:  GENERAL:  well-developed, well-nourished, sitting in bed in no acute distress. Still somewhat somnolent though easily woken. Confused but a little more conversant today  ENT:  Head is normocephalic, atraumatic. Oropharynx is moist without exudates or ulcers.  EYES:  Eyes have anicteric sclerae.    NECK:  Supple.  LUNGS:  Clear to auscultation.  CARDIOVASCULAR:  Regular rate and rhythm with no murmurs, gallops or rubs.  ABDOMEN:  Normal bowel sounds, soft, nontender.  EXT: Extremities warm and without edema. Left hip with GUERRERO drain in place, serosanguinous fluid in line and collection vessel.  SKIN:  No acute rashes. Dressing on/around surgical site with some dried yellow-red (predominantly red) discharge - mostly at drain site (none noted on dressing further up toward his hip), no bart erythema noted around dressing at any location.  NEUROLOGIC:  Grossly nonfocal, though confused and somnolent         Laboratory Data:   Reviewed.  Pertinent for:    Microbiology:  Culture   Date Value Ref Range Status   06/15/2023 Positive on the 1st day of incubation (A)  Preliminary   06/15/2023 Gram positive cocci in clusters (AA)  Preliminary     Comment:     2 of 2 bottles   06/15/2023 Positive on the 1st day of incubation (A)   Preliminary   06/15/2023 Gram positive cocci in clusters (AA)  Preliminary     Comment:     2 of 2 bottles   06/14/2023 Positive on the 1st day of incubation (A)  Preliminary   06/14/2023 Gram positive cocci in clusters (AA)  Preliminary     Comment:     2 of 2 bottles   06/14/2023 Positive on the 1st day of incubation (A)  Preliminary   06/14/2023 Gram positive cocci in clusters (AA)  Preliminary     Comment:     2 of 2 bottles     05/26/2023 No anaerobic organisms isolated  Final   05/26/2023 No anaerobic organisms isolated  Final   05/26/2023 No anaerobic organisms isolated  Final   05/26/2023 No anaerobic organisms isolated  Final   05/26/2023 No Growth  Final   05/26/2023 No Growth  Final   05/26/2023 No Growth  Final   05/26/2023 No Growth  Final   05/26/2023 No anaerobic organisms isolated  Final   05/26/2023 No Growth  Final   04/07/2023 No anaerobic organisms isolated  Final   04/07/2023 No Growth  Final   04/07/2023 No anaerobic organisms isolated  Final   04/07/2023 No Growth  Final   04/07/2023 No anaerobic organisms isolated  Final   04/07/2023 No Growth  Final   04/07/2023 No anaerobic organisms isolated  Final   04/07/2023 No Growth  Final   04/07/2023 No anaerobic organisms isolated  Final   04/07/2023 No Growth  Final   04/07/2023 No anaerobic organisms isolated  Final   04/07/2023 No Growth  Final   11/22/2022 4+ Finegoldia magna (A)  Final     Comment:     Susceptibilities done on previous cultures   11/22/2022 No Growth  Final   11/22/2022 1+ Proteus mirabilis (A)  Final     Comment:     Susceptibilities done on previous cultures   11/22/2022 1+ Finegoldia magna (A)  Final     Comment:     Susceptibilities done on previous cultures   11/22/2022 No Growth  Final   11/22/2022 1+ Proteus mirabilis (A)  Final   11/22/2022 No anaerobic organisms isolated  Final   11/22/2022 No anaerobic organisms isolated  Final   11/22/2022 No anaerobic organisms isolated  Final   11/22/2022 No Growth  Final    11/22/2022 No Growth  Final   11/22/2022 No Growth  Final   11/22/2022 Isolated in broth only Proteus mirabilis (A)  Final     Comment:     On day 1 of incubationSusceptibilities done on previous cultures   11/22/2022 Isolated in broth only Proteus mirabilis (A)  Final     Comment:     On day 1 of incubationSusceptibilities done on previous cultures   11/22/2022 1+ Proteus mirabilis (A)  Final   11/22/2022 No anaerobic organisms isolated  Final   11/22/2022 No Growth  Final   11/22/2022 1+ Proteus mirabilis (A)  Final     Comment:     Susceptibilities done on previous cultures   11/14/2022 4+ Porphyromonas species (A)  Final     Comment:     Beta Lactamase Positive  Identification obtained by MALDI-TOF mass spectrometry research use only database. Test characteristics determined and verified by the [facility].   11/14/2022 1+ Finegoldia magna (A)  Corrected   11/14/2022 3+ Proteus mirabilis (A)  Final   11/14/2022 No Growth  Final     Metabolic Studies       Recent Labs   Lab Test 06/16/23  0820 06/16/23  0605 06/15/23  1730 06/15/23  1259 06/15/23  0829 06/14/23  1817 06/14/23  0742 06/14/23  0651 06/13/23  0746 06/13/23  0603 06/10/23  1809 06/10/23  1444 05/31/23  0805 05/31/23  0630 05/30/23  0929 05/30/23  0643 05/29/23  0812 05/29/23  0512 03/21/23  0810 03/20/23  1919 01/10/23  1710 01/10/23  1105   NA  --  141  --   --   --   --   --  140  --   --   --  139  --  139  --  138  --  138   < >  --    < >  --    POTASSIUM  --  4.2  --   --   --   --   --  4.4  --   --   --  4.1  --  4.0  --  4.0  --  4.3   < >  --    < >  --    CHLORIDE  --  104  --   --   --   --   --  102  --   --   --  103  --  104  --  104  --  104   < >  --    < >  --    CO2  --  24  --   --   --   --   --  23  --   --   --  25  --  25  --  25  --  25   < >  --    < >  --    ANIONGAP  --  13  --   --   --   --   --  15  --   --   --  11  --  10  --  9  --  9   < >  --    < >  --    BUN  --  27.7*  --   --    --   --   --  24.5*  --   --   --  17.2  --  26.0*  --  28.5*  --  33.6*   < >  --    < >  --    CR  --  1.14  --   --   --   --   --  1.15  --   --   --  1.10  --  0.90  --  0.94  --  1.09   < >  --    < >  --    GFRESTIMATED  --  69  --   --   --   --   --  68  --   --   --  72  --  >90  --  87  --  73   < >  --    < >  --    * 171* 163* 196* 183* 140*   < > 146*   < >  --    < > 119*   < > 129*   < > 135*   < > 147*   < >  --    < >  --    A1C  --  5.6  --   --   --   --   --   --   --   --   --   --   --   --   --   --   --   --   --   --    < >  --    MAIKOL  --  8.3*  --   --   --   --   --  8.2*  --   --   --  8.3*  --  8.2*  --  8.0*  --  7.8*   < >  --    < >  --    MAG  --   --   --   --   --   --   --  1.6*  --  1.6*   < > 1.3*  --   --   --   --   --   --   --   --   --   --    LACT  --   --   --   --   --   --   --   --   --   --   --   --   --   --   --   --   --   --   --  1.3  --  0.9    < > = values in this interval not displayed.     Hepatic Studies    Recent Labs   Lab Test 06/16/23  0605 06/14/23  0651 05/31/23  0630 05/26/23  0753 03/30/23  1054 12/04/22  1035   BILITOTAL 0.2 0.2 0.2 0.3 0.2 0.3   ALKPHOS 114 110 74 92 80 74   ALBUMIN 2.8* 2.8* 3.2* 3.7 3.7 2.1*   AST 16 16 14 10 12 7   ALT 10 8 6* 6* 13 9     Hematology Studies      Recent Labs   Lab Test 06/16/23  0605 06/14/23  1010 05/31/23  0630 05/30/23  1532 05/28/23  0538 05/27/23  0714 05/26/23  0753 05/25/23  0644   WBC 12.3* 14.1* 8.4 6.3  --   --  9.5 9.6   HGB 7.5* 7.2* 7.6* 7.9* 8.8* 9.9* 10.5* 10.4*   HCT 24.0* 23.1* 24.5* 23.9*  --   --  32.9* 32.5*    444 297  --   --   --  309 295     Arterial Blood Gas Testing    Recent Labs   Lab Test 06/15/23  1325 11/26/22  0945 11/22/22  1925 11/22/22  1644   PH 7.39  --   --   --    PCO2 44  --   --   --    PO2 83  --   --   --    HCO3 26  --   --   --    O2PER 2 32 40.0 47.0          Latest Ref Rng & Units 6/16/2023     6:05 AM 6/14/2023    10:10 AM 6/14/2023     6:51 AM  6/10/2023     2:44 PM 5/31/2023     6:30 AM   Transplant Immunosuppression Labs   Creat 0.67 - 1.17 mg/dL 1.14    1.15   1.10   0.90     Urea Nitrogen 8.0 - 23.0 mg/dL 27.7    24.5   17.2   26.0     WBC 4.0 - 11.0 10e3/uL 12.3   14.1     8.4     Neutrophil % 74   72     48            Imaging:   XR Chest Port 1 View  Result Date: 6/14/2023  IMPRESSION: Negative portable view. If further detail is needed comment consider upright PA and lateral examination when clinical condition permits. TAE MORRIS MD   SYSTEM ID:  [id number]    XR Abdomen 1 View  Result Date: 6/10/2023  IMPRESSION: Nonobstructive bowel gas pattern. Large stool burden. I have personally reviewed the examination and initial interpretation and I agree with the findings. JONNATHAN COLEMAN MD   SYSTEM ID:  [id number]

## 2023-06-16 NOTE — PLAN OF CARE
Goal Outcome Evaluation:      Plan of Care Reviewed With: patient    Overall Patient Progress: declining    Outcome Evaluation: Patient with poor intakes and appetite however now with altered mental status and NPO status currently.

## 2023-06-16 NOTE — PROGRESS NOTES
[facility]  Internal Medicine Progress Note    TCU Day # 14    Assessment & Plan: Waldo Bustos is a 72 year old male with a history of type 2 diabetes, hyperlipidemia, chronic back pain, JAIR, DVT/PE on DOAC, and chronic left hip prosthetic joint infection initially admitted to [facility] on 11/22/2022 for scheduled explant of left hip prosthesis, debridement, and reconstruction with antibiotic spacer.  Postop course complicated by profound deconditioning.  He was admitted to TCU initially on 12/23/2022 for physical rehabilitation, however therapies no longer worked with patient after several months of an in bed therapy and his refusals to mobilize.  Underwent biopsy on 4/7/2023 without evidence of infection.  He was transferred back to [facility] for left total hip arthroplasty and reimplantation on 5/26/23 with Dr. Meyers.  Again, postop course complicated by severe deconditioning.  He was readmitted to TCU on 6/2/2023 for ongoing rehabilitation.    #GPC bacteremia   #Leukocytosis  Confusion as below and in addition, pt hypoxic in the low 80s and requiring 2 liters O2 NC. Began infectious workup. Chest XR normal. .16, WBC 14.1, PCL 0.30. BNP 2971- normal ECHO 5/31/23. No s/s of fluid overload. Hip surgery was 5/26/23. Purulent drainage around drain site, incision c/d/i no redness. Denies increase in pain. Blood cultures 4 of 4bottles positive gram + cocci clusters, staph aureus. ID recommended repeat TTE, changed to KEILA per ECHO department 2/2 prior TTE images poor quality and repeat would not adequately visualize the heart valves   -blood cultures 6/14 (+ GPC), 6/15 (+GPC), 6/16 pending   -pharmacy to dose vancomycin  -PIV placed  -Infectious disease following appreciate recs  -Transesophageal Echocardiogram- 6/19 (updated ID about delay)  -US of left hip - no fluid collection noted   -CMP, CBC, blood cultures  in a.m.     #Confusion  #Altered mental status  Has  had intermittent confusion since Monday morning. Stated he thought there were multiple people in his room overnight. Originally was easily reoriented. Decreased oxycodone dosing from 5-10 mg to 2.5 -5 mg with improvement in mental status, but with poor pain control and pt refusing therapies, increased range to 5-7.5 mg every 4 hours. Last dose 7.5 mg this a.m. around 0900 and previous dose of 5 mg around 2200 last dagmar. Respirations 12-13. Hx of JAIR with non compliance with CPAP. Blood sugars normal. ABG WDL. Vitamin B12 normal   -Head CT (patient not cooperating, raising hands over head so unable to complete)  -UA/UC- not allowing staff to straight cath  -pt to transfer to acute care with any decompensation in mentation, hemodynamic instability      # S/p left hip explantation of left hip antibiotic spacer and revision of left total hip arthroplasty (5/26/2023)- surgery by Dr. Rahman's team.  Developed left foot drop postoperatively.  AFOs ordered but patient has been declining to use these.  OR cultures no growth to date.  Pain has overall been well controlled.   -Activity: 50% PWB LLE with posterior hip precautions [id number]  -AFO ordered for foot drop  -PT/OT  -Knee to be kept even in a foam leg elevator to relax sciatic nerve  -Pain: Scheduled robaxin 4 times a day, APAP PRN                 -oxycodone  to 5-7.5 mg discontinued   -start tramadol 25-50 mg every 6 hours PRN   -follow up with Dr Meyers in clinic 6/26/2023     #Type 2 diabetes mellitus  Last hemoglobin A1c 5.6% on 6/16/2023.  On glipizide, metformin, Actos, and Victoza prior to hospitalizations and surgery.  -Continue medium sliding scale NovoLog  -Continue holding PTA medications for now  -POCT BG 3 times daily before meals and at bedtime  -Hypoglycemia protocol in place  Recent Labs   Lab 06/16/23  0605 06/15/23  1730 06/15/23  1259 06/15/23  0829 06/14/23  1817 06/14/23  0742   * 163* 196* 183* 140* 151*       #Constipation  Abdomen x-ray 6/10  showed nonobstructive bowel gas pattern and large stool burden.  -Continue Colace 100 mg twice daily  -Continue senna 1 tablet twice daily  -Continue MiraLAX daily  -Consider decrease in amount of stool softeners when patient off oxycodone     #Hypomagnesemia  -MagOx 400 mg daily     #Severe malnutrition in the context of acute on chronic illness  Goal for patient is to consume % of meals TID. Goal is 2142-2850 kcals/daily. Protein needs- 100-126 grams/daily. Corrected calcium 9.3.  -RD following and appreciate recs  -regular diet  -Weekly weights  -I/Os       #Adjustment disorder with depressed mood  #Prolonged grief disorder  Patient has been with depressed mood in setting of ongoing health issues, as evidenced by lack of motivation to work with therapies during both TCU admissions.  Past history of passive suicidal statements.  Psychiatry saw patient during hospital admission on 5/31 due to lack of motivation with therapies..  Patient declined interest in starting any psychiatric medications.  Willing to engage with health psychology.   -Outpatient health psychology consult  -Continue to monitor for willingness to start antidepressant therapy     Stable and Resolved Issues:  #Hyperlipidemia-continue PTA Lipitor 40 mg daily  #JAIR-CPAP when sleeping  #Unprovoked DVT in left common femoral, deep and superficial femoral, popliteal, and posterior tibial veins, and pulmonary embolism-diagnosed in 2018.  Continue PTA apixaban 5 mg twice daily    Consulting Services: PT, OT, Ortho, Dietician      CODE: Full code  DVT: DOAC  Diet: Regular  Indications for Psychotropic Medications: NA  Vaccinations:   Disposition: TBD      Emperatriz Wright, CNP, APRN  Internal Medicine [facility] Hospitalist  [facility]  Pager [phone]    ________________________________________________________________    Subjective & Interval History:      Waldo Bustos is a 72 year old male at the TCU post hip surgery for skilled nursing and  therapies.  Patient remains lethargic and sleeps often.  Much more oriented today and able to participate in conversation and answer questions.  Discussed plan of care for the weekend and Monday.  Patient agrees to team on [place] Monday.  Discussed continuation of antibiotics and will review ultrasound results.  Patient denies pain.  No other questions or concerns for medicine at this time.  Due to patient lethargy and staffing, KEILA will be done on Monday on the [place] when patient more alert to sign consent.    Last 24 hour care team notes reviewed.   ROS: 4 point ROS (including Respiratory, CV, GI and ) was performed and negative unless otherwise noted in HPI.     Medications: Reviewed in EPIC.    Physical Exam:    Blood pressure 115/58, pulse 88, temperature 97.5  F (36.4  C), temperature source Oral, resp. rate 16, weight 117.4 kg (258 lb 13.1 oz), SpO2 96 %.    GENERAL: Lethargic. Responds to verbal and tactile stimuli. Oriented   HEENT: Normocephalic, atraumatic. Anicteric sclera. Mucous membranes moist.   CV: RRR. S1, S2. No murmurs appreciated.   RESPIRATORY: Effort normal on RA. Lungs CTAB with no wheezing, rales, or rhonchi.   GI: Abdomen soft and non distended, bowel sounds present x all 4 quadrants. No tenderness, rebound, or guarding.   NEUROLOGICAL: No focal deficits. Follows commands.  Strength weak in upper and lower extremities.   MUSCULOSKELETAL: No joint swelling or tenderness. Moves all extremities.   EXTREMITIES: No gross deformities. No peripheral edema.   SKIN: Grossly warm, dry, and intact. No jaundice. No rashes. Ecchymotic, surgical incision      Lines/Tubes/Drains:      PIV   GUERRERO drain       ROUTINE IP LABS (Last four results)  CMP   Recent Labs   Lab 06/16/23  0605 06/15/23  1730 06/15/23  1259 06/15/23  0829 06/14/23  0742 06/14/23  0651 06/13/23  0746 06/13/23  0603 06/12/23  0807 06/12/23  0008 06/11/23  0800 06/11/23  0104 06/10/23  1809 06/10/23  1444     --   --   --    --  140  --   --   --   --   --   --   --  139   POTASSIUM 4.2  --   --   --   --  4.4  --   --   --   --   --   --   --  4.1   CHLORIDE 104  --   --   --   --  102  --   --   --   --   --   --   --  103   CO2 24  --   --   --   --  23  --   --   --   --   --   --   --  25   ANIONGAP 13  --   --   --   --  15  --   --   --   --   --   --   --  11   * 163* 196* 183*   < > 146*   < >  --    < >  --    < >  --    < > 119*   BUN 27.7*  --   --   --   --  24.5*  --   --   --   --   --   --   --  17.2   CR 1.14  --   --   --   --  1.15  --   --   --   --   --   --   --  1.10   MAIKOL 8.3*  --   --   --   --  8.2*  --   --   --   --   --   --   --  8.3*   MAG  --   --   --   --   --  1.6*  --  1.6*  --  1.5*  --  1.4*  --  1.3*   PROTTOTAL 6.2*  --   --   --   --  6.3*  --   --   --   --   --   --   --   --    ALBUMIN 2.8*  --   --   --   --  2.8*  --   --   --   --   --   --   --   --    BILITOTAL 0.2  --   --   --   --  0.2  --   --   --   --   --   --   --   --    ALKPHOS 114  --   --   --   --  110  --   --   --   --   --   --   --   --    AST 16  --   --   --   --  16  --   --   --   --   --   --   --   --    ALT 10  --   --   --   --  8  --   --   --   --   --   --   --   --     < > = values in this interval not displayed.     CBC   Recent Labs   Lab 23  0605 23  1010   WBC 12.3* 14.1*   RBC 2.55* 2.40*   HGB 7.5* 7.2*   HCT 24.0* 23.1*   MCV 94 96   MCH 29.4 30.0   MCHC 31.3* 31.2*   RDW 14.4 13.9    444     INR No lab results found in last 7 days.    OTHER:  ABG's         Medical Decision Makin MINUTES SPENT BY ME on the date of service doing chart review, history, exam, documentation & further activities per the note.

## 2023-06-16 NOTE — PLAN OF CARE
Goal Outcome Evaluation:  Pt slept throughout most of the night. Pt woke up to have blood drawn and was alert, but lethargic. Pt was able to identify staff by name, ask for water and take sips of the water, with assistance. Pt quickly went back to sleep.

## 2023-06-16 NOTE — PLAN OF CARE
Goal Outcome Evaluation:    Patient is somnolent with confusion, but arouses to voice and touch. He is not always able to make his needs known. Pt denied SOB and chest pain.  Pt is regular diet, and able to swallow without difficulty.Takes med whole with thin liquid. Pt only ate 1 bite of his dinner. Pt is PWB to E. Assist of 2 with liko lift for transfer. He is continent of bowel and incontinent of bladder with urinal within reach at bedside. Pt requested for urinal once with output of 150 ml dark yellow urine with very small sediments. GUERRERO drain is intact with 30 ml dark reddish-pink blood with small sediments emptied from bulb. Incision site is intact. Incision site was soaked with yellowish drainage. Performed incision site care and dressing change. Pt is expected to have a repeat blood culture tomorrow AM per MD note. Pt currently has a bedside sitter. Continue to monitor and care per plan of care.     Patient's most recent vital signs are:     Vital signs:  BP: 115/58  Temp: 97.5  HR: 88  RR: 16  SpO2: 96 %     Patient does not have new respiratory symptoms.  Patient does not have new sore throat.  Patient does not have a fever greater than 99.5.

## 2023-06-17 PROBLEM — R78.81 BACTEREMIA: Status: ACTIVE | Noted: 2023-01-01

## 2023-06-17 NOTE — H&P
[facility]    History and Physical - Hospitalist Service       Date of Admission:  6/17/2023    Assessment & Plan        Chief Complaint  Altered mental status, Bacteremia    History obtained from: The patient    HPI  72 M WITH pmh sig for DM, 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 S/p explant of L hip prosthesis debridement and antibiotic spacer and left hip revision arthroplasty and reimplantation 5/26/23 complicated by profound deconditioning and malnutrition. Patient subsequently admitted to TCU.     Past 2 days patient developed AMS, hypoxia, and found to have MRSA bacteremia on 6/14 (2/2 bottles), 6/15 (2/2 bottles) and 6/17 (2/2 bottles). Patient started on IV vancomycin and ID consulted. Left hip ultrasound negative for abscess/fluid. He is recommended for KEILA due to previous poor windows with TTE, thus was transferred to [place] on 6/17.      Currently, patient reports that he is doing ok. No complaints aside from chronic left hip pain. No fevers or chills. No cough. No belly pain diarrhea, vomiting. No chest pain. He says his brother felt that he has been more confused for the past 2 days.     To Do (6/18)   - follow up left hip x-ray and orthopedic recommendations   - follow up ID recommendations   - Plan NPO at midnight 6/18 for KEILA 6/19  - follow up blood cultures  - follow up cx from left hip drain 6/17     #MRSA bacteremia  #Leukocytosis  - blood cultures 6/14 and 6/15 + for MRSA, and 6/16 growing gram positive cocci in clusters   - started vancomycin 6/15   - Chest XR normal. .16, WBC 14.1, PCL 0.30. BNP 2971- normal ECHO 5/31/23.   - per TCU notes, purulent drainage around drain site, incision c/d/i no redness. Denies increase in pain.  -  Blood cultures 4 of 4bottles positive gram + cocci clusters, staph aureus.   - ID recommended repeat TTE, changed to KEILA per ECHO  "department 2/2 prior TTE images poor quality and repeat would not adequately visualize the heart valves .  -  No fluid collection on left hip US. Blood cultures 6/14-6/16 +[facility]PC.  - most likely source hip, consulted ortho who obtained cultures from drain on 6/17/2023  Plan  -Repeat blood culture daily until negative   - Follow up cx from left hip drain obtained 6/17   - follow up urine cultures  -pharmacy to dose vancomycin  -Infectious disease following appreciate recs  -Transesophageal Echocardiogram- 6/19, will need to be NPO after midnight on 6/18    # S/p left hip explantation of left hip antibiotic spacer and revision of left total hip arthroplasty (5/26/2023)  - surgery by Dr. Rahman's team.  Developed left foot drop postoperatively.  AFOs ordered but patient has been declining to use these.  OR cultures no growth to date.  Pain has overall been well controlled.   - consulted ortho due to hip as potential source  Plan  - follow up left hip x-ray (recommended by ortho on 6/17)  -Activity: 50% PWB LLE with posterior hip precautions [id number]  -AFO ordered for foot drop but patient refusing to use  -PT/OT  -Knee to be kept even in a foam leg elevator to relax sciatic nerve  -Pain: Scheduled robaxin 4 times a day, APAP PRN                 -oxycodone  to 5-7.5 mg discontinued                 -start tramadol 25-50 mg every 6 hours PRN   -see above    # Hx of unprovoked DVT in 2018   - on eliquis 5 mg bid outpatient  Plan  - continue eliquis, may have to hold if procedures    # Chest pain (5/31-> resolved)  -  trops were found to be elevated (46-51-51) with no significant delta  -  TTE did not show decreased EF of new FWMA.   - per cards \"Given his HEART score is 4 (HLD, T2DM, age, trops) we would like assess coronary anatomy with CCTA, but Mr Bustos does not want to undergo any further study\"  - There is mild coronary artery calcification in the proximal LAD from CT chest from 4/5/2022.  Plan  - CCTA can be " reconsidered as an outpatient.   - Possible undiagnosed CAD may increase cardiac risk for future procedures     #Confusion  #Altered mental status  - Has had intermittent confusion since Monday morning. Stated he thought there were multiple people in his room overnight. Originally was easily reoriented.   - Decreased oxycodone dosing from 5-10 mg to 2.5 -5 mg with improvement in mental status at TCU, then ultimately discontinued altogether.   -  Blood sugars normal. ABG NDL. Vitamin B12 normal   - unable to complete head CT, raising hands over head during exam.   - No focal neurologic deficits at this time, most likely delirium iso prolonged hospitalization and infection  - currently he is AO x 4 and seeming to be doing better since off oxycodone and on antibiotics   Plan  - delirium precuations    #Hypoxia  # Hx of JAIR  - At TCU desat to low 80's on room air and required 2L oxygen.   - Chest x-ray grossly unremarkable.   - BNP is 2971, mild lower extremity edema, does not appear floridly volume overloaded   - doubt PE on AC   - possibly untreated jair is contributing   Plan  - wean oxygen as tolerated      #Type 2 diabetes mellitus  - Last hemoglobin A1c 5.6% on 6/16/2023.  On glipizide, metformin, Actos, and Victoza prior to hospitalizations and surgery.  Plan  -Continue medium sliding scale NovoLog  -Continue holding PTA medications for now  -POCT BG 3 times daily before meals and at bedtime  -Hypoglycemia protocol in place    #Constipation  - Abdomen x-ray 6/10 showed nonobstructive bowel gas pattern and large stool burden.  Plan  -Continue Colace 100 mg twice daily  -Continue senna 1 tablet twice daily  -Continue MiraLAX daily    #Hypomagnesemia  -monitor and replete      #Severe malnutrition in the context of acute on chronic illness  Goal for patient is to consume % of meals TID. Goal is 1708-9757 kcals/daily. Protein needs- 100-126 grams/daily. Corrected calcium 9.3.  Plan  -RD consult and appreciate  recs  -regular diet  -Weekly weights  -I/Os      #Adjustment disorder with depressed mood  #Prolonged grief disorder  Patient has been with depressed mood in setting of ongoing health issues, as evidenced by lack of motivation to work with therapies during both TCU admissions.  Past history of passive suicidal statements. Psychiatry saw patient during hospital admission on 5/31 due to lack of motivation with therapies..  Patient declined interest in starting any psychiatric medications.  Willing to engage with health psychology.   Plan  -Outpatient health psychology consult  -Continue to monitor for willingness to start antidepressant therapy     Stable and Resolved Issues:  #Hyperlipidemia-continue PTA Lipitor 40 mg daily  #JAIR-CPAP when sleeping    DVT Prophylaxis:   Eliquis      Review of Systems    The 10 point Review of Systems is negative other than noted in the HPI or here.     Exam    Physical Exam   Vital Signs:                    Weight: 0 lbs 0 oz    General Appearance: Alert and oriented x3  HEENT: Anicteric sclera, MMM   Respiratory: Breathing comfortably on room air, lungs CTAB without wheezing or crackles   Cardiovascular: RRR, S1, S2. No murmur noted  GI: Abdomen soft, non-tender with active bowel sounds. No guarding or rebound  Lymph/Hematologic: 2+ edema left lower extremity, 1+ edema right lower extremity. Left hip incision is intact without purulent drainage at this time. GUERRERO drain is in place with serosanguinous output.   Skin: No rash or jaundice   Musculoskeletal: Moves all extremities   Neurologic: No focal deficits, CN II-XII grossly intact    Past Medical History    Past Medical History    I have reviewed this patient's medical history and updated it with pertinent information if needed.   Past Medical History:   Diagnosis Date     Arthritis 5 years ago     Chronic osteoarthritis Not sure     Diabetes (H) 10-12 years ago     DVT (deep venous thrombosis) (H)      Dyslipidemia       Gastroesophageal reflux disease      History of blood transfusion      Iliopsoas abscess (H)      Infection of prosthetic hip joint (H)      JAIR (obstructive sleep apnea)      Pulmonary embolism (H)      RA (rheumatoid arthritis) (H) Not sure     Reduced vision 2-3 years ago    Cataract Surgery on both eyes     Venous insufficiency        Past Surgical History   I have reviewed this patient's surgical history and updated it with pertinent information if needed.  Past Surgical History:   Procedure Laterality Date     ARTHROPLASTY REVISION HIP Left 5/26/2023    Procedure: Revision Left Total Hip Arthroplasty;  Surgeon: Rom Meyers MD;  Location: [facility] OR     ASPIRATION NEEDLE HIP Left 4/7/2023    Procedure: ARTHROCENTESIS, LEFT HIP;  Surgeon: Rom Meyers MD;  Location: [facility] OR     Cataract       COLONOSCOPY       EGD       IR FINE NEEDLE ASPIRATION W ULTRASOUND  3/6/2023     IR IVC FILTER PLACEMENT      removed     IRRIGATION AND DEBRIDEMENT HIP, PLACE ANTIBIOTIC CEMENT BEADS / SPACER Left 11/22/2022    Procedure: and Reconstruction Using G 20 Prosthetic Antibiotic Cement Spacer;  Surgeon: Rom Meyers MD;  Location: [facility] OR     REMOVE ANTIBIOTIC CEMENT BEADS / SPACER HIP Left 5/26/2023    Procedure: Explantation of Left Hip Antibiotic Spacer;  Surgeon: Rom Meyers MD;  [facility]: [facility] OR     REMOVE HARDWARE ARTHROPLASTY HIP Left 11/22/2022    Procedure: Explantation of Chronic  Infected Left Total Hip Arthroplasty, Extended Trochanteric Osteotomy with Extensive Debridement;  Surgeon: Rom Meyers MD;  [facility]: [facility] OR     SYNOVIAL BIOPSY HIP Left 4/7/2023    Procedure: BIOPSY, SYNOVIUM, LEFT  HIP, percutaneous;  Surgeon: Rmo Meyers MD;  Location: [facility] OR     ZZC PELVIS/HIP JOINT SURGERY UNLISTED       [facility] STOMACH SURGERY PROCEDURE UNLISTED  1954    2 Hernia surgeries as a child       Social History   I have reviewed this patient's social history and updated it with pertinent information if  needed.  Social History     Tobacco Use     Smoking status: Never     Smokeless tobacco: Never   Substance Use Topics     Alcohol use: No     Drug use: No       Family History         Prior to Admission Medications   Prior to Admission Medications   Prescriptions Last Dose Informant Patient Reported? Taking?   acetaminophen (TYLENOL) 325 MG tablet   No No   Sig: Take 2 tablets (650 mg) by mouth every 4 hours as needed for other (For optimal non-opioid multimodal pain management to improve pain control.)   apixaban ANTICOAGULANT (ELIQUIS) 5 MG tablet   Yes No   Sig: Take 5 mg by mouth 2 times daily   atorvastatin (LIPITOR) 40 MG tablet   Yes No   Sig: Take 40 mg by mouth daily   ibuprofen (ADVIL/MOTRIN) 600 MG tablet   No No   Sig: Take 1 tablet (600 mg) by mouth every 6 hours as needed for inflammatory pain   insulin aspart (NOVOLOG PEN) 100 UNIT/ML pen   No No   Sig: Inject 1-7 Units Subcutaneous 3 times daily (before meals)   insulin aspart (NOVOLOG PEN) 100 UNIT/ML pen   No No   Sig: Inject 1-5 Units Subcutaneous At Bedtime   lactobacillus rhamnosus, GG, (CULTURELL) capsule   No No   Sig: Take 1 capsule by mouth 2 times daily   methocarbamol (ROBAXIN) 500 MG tablet   No No   Sig: Take 1 tablet (500 mg) by mouth every 6 hours as needed for muscle spasms   oxyCODONE (ROXICODONE) 5 MG tablet   No No   Sig: Take 1 tablet (5 mg) by mouth every 4 hours as needed for moderate pain   polyethylene glycol (MIRALAX) 17 g packet   No No   Sig: Take 17 g by mouth daily   senna-docusate (SENOKOT-S/PERICOLACE) 8.6-50 MG tablet   No No   Sig: Take 1 tablet by mouth 2 times daily      Facility-Administered Medications: None     Allergies   Allergies   Allergen Reactions     Sulfa Antibiotics      Other reaction(s): *Unknown - Childhood Rxn     Tizanidine Other (See Comments)     Frequent urination; causes drowsiness, and dry mouth         Inpatient bundle  Tran Catheter: Not present    Lines: None       Cardiac Monitoring:  "None    Code Status:  full code    Clinically Significant Risk Factors Present on Admission               # Drug Induced Coagulation Defect: home medication list includes an anticoagulant medication         # Obesity: Estimated body mass index is 36.45 kg/m  as calculated from the following:    Height as of 5/26/23: 1.778 m (5' 10\").    Weight as of 6/16/23: 115.2 kg (254 lb).            Disposition Plan         Raymond Garcia PA-C  Hospitalist Service  [facility]  Securely message with Vocera (more info)  Text page via [facility] Paging/Directory     The patient's care was discussed with: attending physician    Data reviewed     Data   Data reviewed today: I reviewed all medications, new labs and imaging results over the last 24 hours. See A/P for discussion on these results.     Recent Labs   Lab 06/17/23  1122 06/17/23  0853 06/17/23  0818 06/17/23  0231 06/16/23  0820 06/16/23  0605 06/14/23  1817 06/14/23  1010 06/14/23  0742 06/14/23  0651   WBC  --  13.8*  --   --   --  12.3*  --  14.1*  --   --    HGB  --  8.3*  --   --   --  7.5*  --  7.2*  --   --    MCV  --  99  --   --   --  94  --  96  --   --    PLT  --  508*  --   --   --  438  --  444  --   --    NA  --   --   --   --   --  141  --   --   --  140   POTASSIUM  --   --   --   --   --  4.2  --   --   --  4.4   CHLORIDE  --   --   --   --   --  104  --   --   --  102   CO2  --   --   --   --   --  24  --   --   --  23   BUN  --   --   --   --   --  27.7*  --   --   --  24.5*   CR  --  1.19*  --   --   --  1.14  --   --   --  1.15   ANIONGAP  --   --   --   --   --  13  --   --   --  15   MAIKOL  --   --   --   --   --  8.3*  --   --   --  8.2*   *  --  172* 162*   < > 171*   < >  --    < > 146*   ALBUMIN  --   --   --   --   --  2.8*  --   --   --  2.8*   PROTTOTAL  --   --   --   --   --  6.2*  --   --   --  6.3*   BILITOTAL  --   --   --   --   --  0.2  --   --   --  0.2   ALKPHOS  --   --   --   --   " --  114  --   --   --  110   ALT  --   --   --   --   --  10  --   --   --  8   AST  --   --   --   --   --  16  --   --   --  16    < > = values in this interval not displayed.       No results found for this or any previous visit (from the past 24 hour(s)).    Billing  Medical Decision Making       60 MINUTES SPENT BY ME on the date of service doing chart review, history, exam, documentation & further activities per the note.

## 2023-06-17 NOTE — PHARMACY-VANCOMYCIN DOSING SERVICE
Pharmacy Vancomycin Initial Note  Date of Service 2023  Patient's  1950  72 year old, male    Indication: MRSA Bacteremia, vanco MARCOS</=0.5    Current estimated CrCl = Estimated Creatinine Clearance: 72.4 mL/min (A) (based on SCr of 1.19 mg/dL (H)).    Creatinine for last 3 days  2023:  6:05 AM Creatinine 1.14 mg/dL  2023:  8:53 AM Creatinine 1.19 mg/dL    Recent Vancomycin Level(s) for last 3 days  2023:  8:53 AM Vancomycin 16.0 ug/mL      Vancomycin IV Administrations (past 72 hours)                   vancomycin (VANCOCIN) 2,000 mg in sodium chloride 0.9 % 500 mL intermittent infusion (mg) 2,000 mg New Bag 23 1111    vancomycin (VANCOCIN) 2,000 mg in sodium chloride 0.9 % 500 mL intermittent infusion (mg) 2,000 mg New Bag 23 1041      Restarted 06/15/23 1306     2,000 mg New Bag  1007                Nephrotoxins and other renal medications (From now, onward)    None          Contrast Orders - past 72 hours (72h ago, onward)    None          [employer]ight[employer] Prediction of Planned Initial Vancomycin Regimen  Loading dose: N/A  Regimen: 1500 mg IV every 24 hours.  Start time: 16:11 on 2023  Exposure target: AUC24 (range)400-600 mg/L.hr   AUC24,ss: 489 mg/L.hr  Probability of AUC24 > 400: 86 %  Ctrough,ss: 14.8 mg/L  Probability of Ctrough,ss > 20: 12 %  Probability of nephrotoxicity (Lodise HAJA ): 10 %        Plan:  1. Patient received vancomycin 2,000 mg on 23 when still at TCU, pharmacist previously evaluated and decreased dose to 1,500 mg IV q24h based on supratherapeutic AUC. Will continue planned regimen of vancomycin 1,500 mg IV q24h  2. Vancomycin monitoring method: AUC  3. Vancomycin therapeutic monitoring goal: 400-600 mg*h/L  4. Pharmacy will check vancomycin levels as appropriate in 1-3 Days.    5. Serum creatinine levels will be ordered daily for the first week of therapy and at least twice weekly for subsequent weeks.      Cali Kim,  PharmD  Pager: [phone]

## 2023-06-17 NOTE — PLAN OF CARE
0749H: Critical Lab Report received, patient blood culture collected on  06/16 tested positive for Gram Positive Cocci in Clusters. Patient currently on Vancomycin IV. Provider notified/ Emperatriz Wright NP.

## 2023-06-17 NOTE — PLAN OF CARE
Goal Outcome Evaluation:    FOCUS/GOAL    Bowel management, Bladder management, Medication management, Pain management, Wound care management, Mobility, Skin integrity and Safety management    ASSESSMENT, INTERVENTIONS AND CONTINUING PLAN FOR GOAL:    Pt is somnolent arouse to voice, calm, & cooperative with care. Denied CP, SOB, & n/v. On 2L O2 via NC, sating at 96%. A of 2 with Liko lift for transfer; was not OOB this shift. PWB to LLE. Was continent for both B&B; uses bedpan for BM. LBM 06/16/23 on PM shift. Urinal at the bedside for bladder. Was not able to collect UA/UC this shift. Takes med whole with thin liquid. Pt did not request for pain med this shift. Dressing to the GUERRERO site CDI. GUERRERO drain intact with serosanguineous output. No acute issue. Able to make needs known & call light within reach. Will continue with POC.    Patient's most recent vital signs are:     Vital signs:  BP: 133/52  Temp: 96.8  HR: 81  RR: 16  SpO2: 99 %     Patient does not have new respiratory symptoms.  Patient does not have new sore throat.  Patient does not have a fever greater than 99.5.

## 2023-06-17 NOTE — PROGRESS NOTES
Transfer Type: [facility]  Transfer Triage Note    Originating unit: [facility] [place] [facility]    Final intended location for transfer: [facility] surg or IMC     What services or anticipated services require transfer to the [place]? (please refer to triage job guide or discuss with PP RN if [facility] capabilities would be appropriate) KEILA on [place] for ongoing MRSA bacteremia despite appropriate antibiotics    Tele required:  No    Time of admission request: 9:23 AM    Anticipated to be boarding in ED for >4 hours? No Patient is in TCU not in ED    Patient added to Interhospital transfer list?  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 S/p explant of L hip prosthesis debridement and antibiotic spacer and left hip revision arthroplasty and reimplantation 5/26/23 complicated by profound deconditioning and malnutrition. Currently at  TCU. For last few days has had AMS and hypoxia. BC with [facility]. ID following. On IV Vanc. CXR unrevealing. Unable to do CT head due to confusion. Hypoxia has improved. Has JAIR at baseline. TTE poor quality. ID recommending KEILA. Plan to transfer to  for KEILA scheduled for Monday 6/19. L hip US negative for abscess/fluid.    Anne Washington MD

## 2023-06-17 NOTE — DISCHARGE SUMMARY
[facility]  Internal Medicine Discharge Summary    Date of Admission: 6/2/2023  Date of Discharge: 6/17/2023  Discharging Provider: JOSEPH Causey CNP     Follow-ups Needed After Discharge     Infectious disease   Ortho      Reason for Admission  Please see the detailed H & P dated 6/4/23. 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 Transferred to [facility] for ongoing rehabilitation. Altered mental status, confusion, hypoxia started 6/14 with subsequent infectious disease work-up resulting in findings of GPC bacteremia.  Ongoing hypoxia, altered mental status, persistent positive blood culture, unknown source of infection prompted transfer back to acute care inpatient medicine     Discharge Diagnoses     GPC bacteremia   Leukocytosis  Altered mental status  Hypoxia  S/p left hip explantation with antibiotic spacer and revision of left total hip arthroplasty  Type 2 diabetes mellitus  Constipation  Hypomagnesemia  Severe malnutrition in the context of acute on chronic illness  Severe deconditioning  Adjustment disorder with depressed mood  Prolonged grief disorder  Hyperlipidemia  Obstructive sleep apnea  DVT left common femoral, deep and superficial femoral, popliteal, and posterior tibial veins  Pulmonary embolism        Rehab Course     #GPC bacteremia   #Leukocytosis  #Hypoxia  #UTI  Confusion as below and in addition, pt hypoxic in the low 80s and requiring 2 liters O2 NC. Began infectious workup. Chest XR normal. .16, WBC 14.1, PCL 0.30. BNP 2971- normal ECHO 5/31/23. No s/s of fluid overload. Hip surgery was 5/26/23. Purulent drainage around drain site, incision c/d/i no redness. Denies  increase in pain. Blood cultures 4 of 4bottles positive gram + cocci clusters, staph aureus. ID recommended repeat TTE, changed to KEILA per ECHO department 2/2 prior TTE images poor quality and repeat would not adequately visualize the heart valves . No fluid collection on left hip US. Blood cultures 6/14-6/16 +GPC.   -repeat blood culture daily until negative   -pharmacy to dose vancomycin  -PIV placed  -Infectious disease following appreciate recs  -Transesophageal Echocardiogram- 6/19 (updated ID about delay)  -UCx pending   -wean O2 to maintain sats >91%  -CMP, CBC, blood cultures  in a.m.      #Confusion  #Altered mental status  Has had intermittent confusion since Monday morning. Stated he thought there were multiple people in his room overnight. Originally was easily reoriented. Decreased oxycodone dosing from 5-10 mg to 2.5 -5 mg with improvement in mental status, but with poor pain control and pt refusing therapies, increased range to 5-7.5 mg every 4 hours. Last dose 7.5 mg this a.m. around 0900 and previous dose of 5 mg around 2200 last dagmar. Respirations 12-13. Hx of JAIR with non compliance with CPAP. Blood sugars normal. ABG WDL. Vitamin B12 normal   -Head CT (patient not cooperating, raising hands over head so unable to complete)  -UA +, UCx pending     # S/p left hip explantation of left hip antibiotic spacer and revision of left total hip arthroplasty (5/26/2023)- surgery by Dr. Rahman's team.  Developed left foot drop postoperatively.  AFOs ordered but patient has been declining to use these.  OR cultures no growth to date.  Pain has overall been well controlled.   -Activity: 50% PWB LLE with posterior hip precautions [id number]  -AFO ordered for foot drop  -PT/OT  -Knee to be kept even in a foam leg elevator to relax sciatic nerve  -Pain: Scheduled robaxin 4 times a day, APAP PRN                 -oxycodone  to 5-7.5 mg discontinued                 -start tramadol 25-50 mg every 6 hours PRN   -follow up  with Dr Meyers in clinic 6/26/2023     #Type 2 diabetes mellitus  Last hemoglobin A1c 5.6% on 6/16/2023.  On glipizide, metformin, Actos, and Victoza prior to hospitalizations and surgery.  -Continue medium sliding scale NovoLog  -Continue holding PTA medications for now  -POCT BG 3 times daily before meals and at bedtime  -Hypoglycemia protocol in place  Recent Labs   Lab 06/17/23  1122 06/17/23  0818 06/17/23  0231 06/16/23  2142 06/16/23  1834 06/16/23  0820   * 172* 162* 194* 240* 167*          #Constipation  Abdomen x-ray 6/10 showed nonobstructive bowel gas pattern and large stool burden.  -Continue Colace 100 mg twice daily  -Continue senna 1 tablet twice daily  -Continue MiraLAX daily  -Consider decrease in amount of stool softeners when patient off oxycodone     #Hypomagnesemia  -MagOx 400 mg daily      #Severe malnutrition in the context of acute on chronic illness  Goal for patient is to consume % of meals TID. Goal is 8798-0947 kcals/daily. Protein needs- 100-126 grams/daily. Corrected calcium 9.3.  -RD following and appreciate recs  -regular diet  -Weekly weights  -I/Os         #Adjustment disorder with depressed mood  #Prolonged grief disorder  Patient has been with depressed mood in setting of ongoing health issues, as evidenced by lack of motivation to work with therapies during both TCU admissions.  Past history of passive suicidal statements.  Psychiatry saw patient during hospital admission on 5/31 due to lack of motivation with therapies..  Patient declined interest in starting any psychiatric medications.  Willing to engage with health psychology.   -Outpatient health psychology consult  -Continue to monitor for willingness to start antidepressant therapy     Stable and Resolved Issues:  #Hyperlipidemia-continue PTA Lipitor 40 mg daily  #JAIR-CPAP when sleeping  #Unprovoked DVT in left common femoral, deep and superficial femoral, popliteal, and posterior tibial veins, and pulmonary  embolism-diagnosed in 2018.  Continue PTA apixaban 5 mg twice daily      Consultations This Stay   WOUND OSTOMY CONTINENCE NURSE  IP CONSULT  PHARMACY TO DOSE [facility]  INFECTIOUS DISEASE [facility] ADULT IP CONSULT    Physical Exam   Blood pressure 130/70, pulse 71, temperature 97.8  F (36.6  C), temperature source Oral, resp. rate 19, weight 115.2 kg (254 lb), SpO2 98 %.  GENERAL: lethargic, oriented  HEENT: Anicteric sclera. Mucous membranes moist.   CV: RRR. S1, S2. No murmurs appreciated.   RESPIRATORY: Effort normal on room air. Lungs CTAB with no wheezing, rales, rhonchi.   GI: Abdomen soft and non distended, bowel sounds present. No tenderness, rebound, guarding.   NEUROLOGICAL: No focal deficits. Moves all extremities. Limited ROM and weight bearing LLE   EXTREMITIES: No peripheral edema. Intact bilateral pedal pulses.   SKIN: No jaundice. No rashes.  Perry drain intact left leg, LUE incision c/d/i    Significant Results and Procedures   Most Recent 3 CBC's:Recent Labs   Lab Test 06/18/23  0728 06/17/23  0853 06/16/23  0605   WBC 11.1* 13.8* 12.3*   HGB 7.3* 8.3* 7.5*   MCV 97 99 94    508* 438     Most Recent 3 BMP's:Recent Labs   Lab Test 06/18/23  0611 06/17/23  2205 06/17/23  2033 06/17/23  1122 06/17/23  0853 06/16/23  0820 06/16/23  0605 06/14/23  0742 06/14/23  0651 06/10/23  1809 06/10/23  1444   NA  --   --   --   --   --   --  141  --  140  --  139   POTASSIUM  --   --   --   --   --   --  4.2  --  4.4  --  4.1   CHLORIDE  --   --   --   --   --   --  104  --  102  --  103   CO2  --   --   --   --   --   --  24  --  23  --  25   BUN  --   --   --   --   --   --  27.7*  --  24.5*  --  17.2   CR  --   --   --   --  1.19*  --  1.14  --  1.15  --  1.10   ANIONGAP  --   --   --   --   --   --  13  --  15  --  11   MAIKOL  --   --   --   --   --   --  8.3*  --  8.2*  --  8.3*   * 166* 180*   < >  --    < > 171*   < > 146*   < > 119*    < > = values in this interval not displayed.     7-Day Micro  Results     Collected Updated Procedure Result Status      06/17/2023 2010 06/17/2023 2010 Synovial fluid Aerobic Bacterial Culture Routine [[id number]]   Synovial fluid from Hip, Left    In process Component Value   No component results               06/17/2023 2010 06/17/2023 2010 Anaerobic bacterial culture [[id number]]   Synovial fluid from Hip, Left    In process Component Value   No component results               06/17/2023 0853 06/17/2023 2346 Blood Culture Arm, Left [[id number]]   Blood from Arm, Left    Preliminary result Component Value   Culture No growth after 12 hours  [P]                06/17/2023 0853 06/17/2023 2346 Blood Culture Arm, Left [[id number]]   Blood from Arm, Left    Preliminary result Component Value   Culture No growth after 12 hours  [P]                06/17/2023 0836 06/17/2023 0933 Asymptomatic COVID-19 Virus (Coronavirus) by PCR Nose [[id number]]    Swab from Nose    Final result Component Value   SARS CoV2 PCR Negative   NEGATIVE: SARS-CoV-2 (COVID-19) RNA not detected, presumed negative.            06/17/2023 0817 06/17/2023 0916 Urine Culture [[id number]]   Urine, Clean Catch    In process Component Value   No component results               06/16/2023 1011 06/17/2023 0714 Blood Culture Hand, Right [[id number]]   (Abnormal)   Blood from Hand, Right    Preliminary result Component Value   Culture Positive on the 1st day of incubation  [P]     Gram positive cocci in clusters  [P]     1 of 2 bottles               06/16/2023 1010 06/17/2023 0748 Blood Culture Arm, Right [[id number]]   (Abnormal)   Blood from Arm, Right    Preliminary result Component Value   Culture Positive on the 1st day of incubation  [P]     Gram positive cocci in clusters  [P]     1 of 2 bottles               06/15/2023 1005 06/18/2023 0711 Blood Culture Arm, Right [[id number]]   (Abnormal)   Blood from Arm, Right    Final result Component Value   Culture Positive on the 1st day of incubation     Staphylococcus aureus MRSA    2 of 2 bottlesSusceptibilities done on previous cultures               06/15/2023 1003 06/18/2023 0712 Blood Culture Hand, Right [[id number]]   (Abnormal)   Blood from Hand, Right    Final result Component Value   Culture Positive on the 1st day of incubation    Staphylococcus aureus MRSA    2 of 2 bottlesSusceptibilities done on previous cultures               06/14/2023 1405 06/17/2023 0721 Blood Culture Arm, Left [[id number]]   (Abnormal)   Blood from Arm, Left    Final result Component Value   Culture Positive on the 1st day of incubation    Staphylococcus aureus MRSA    2 of 2 bottlesSusceptibilities done on previous cultures               06/14/2023 1358 06/17/2023 0720 Blood Culture Arm, Right [[id number]]    (Abnormal)   Blood from Arm, Right    Final result Component Value   Culture Positive on the 1st day of incubation    Staphylococcus aureus MRSA    2 of 2 bottles          Susceptibility      Staphylococcus aureus MRSA      MARCOS      Clindamycin <=0.25 ug/mL Susceptible      Erythromycin <=0.25 ug/mL Susceptible      Gentamicin <=0.5 ug/mL Susceptible      Linezolid 2 ug/mL Susceptible      Oxacillin >=4 ug/mL Resistant  [1]      Tetracycline <=1 ug/mL Susceptible      Trimethoprim/Sulfamethoxazole <=0.5/9.5 ug/mL Susceptible      Vancomycin <=0.5 ug/mL Susceptible                   [1]  Oxacillin susceptible isolates are susceptible to cephalosporins (example: cefazolin and cephalexin) and beta lactam combination agents. Oxacillin resistant isolates are resistant to these agents.          Susceptibility Comments     Staphylococcus aureus MRSA    MRSA requires contact precautions.             06/14/2023 1358 06/15/2023 0807 Verigene GP Panel [[id number]]    (Abnormal)   Blood from Arm, Right    Final result Component Value   Staphylococcus aureus Detected   Positive for methicillin-resistant Staphylococcus aureus (MRSA) by Codesionigene multiplex nucleic acid test.  Final identification and antimicrobial susceptibility testing will be verified by standard methods.   Staphylococcus epidermidis Not Detected   Staphylococcus lugdunensis Not Detected   Enterococcus faecalis Not Detected   Enterococcus faecium Not Detected   Streptococcus species Not Detected   Streptococcus agalactiae Not Detected   Streptococcus anginosus group Not Detected   Streptococcus pneumoniae Not Detected   Streptococcus pyogenes Not Detected   Listeria species Not Detected              ,   R  Most Recent ABG:Recent Labs   Lab Test 06/15/23  1325   PH 7.39   PO2 83   PCO2 44   HCO3 26   GIA 1.3     Most Recent ESR & CRP:Recent Labs   Lab Test 06/14/23  0651 02/13/23  0817 02/06/23  0839 01/16/23  0748 01/09/23  1212   SED  --   --   --   --  77*   CRP  --   --  3.6   < > <2.9   CRPI 256.16*   < >  --   --   --     < > = values in this interval not displayed.   esults for orders placed or performed during the hospital encounter of 06/02/23   XR Abdomen 1 View    Narrative    EXAM: XR ABDOMEN 1 VIEW  6/10/2023 3:11 PM     HISTORY:  Evaluate for ileus, no BM in 14 days       TECHNIQUE: Single frontal radiograph of the abdomen    COMPARISON:  None available    FINDINGS:   Nonobstructive bowel gas pattern. Large amount of formed stool in the  colon. No pneumatosis, portal venous gas.      Impression    IMPRESSION: Nonobstructive bowel gas pattern. Large stool burden.    I have personally reviewed the examination and initial interpretation  and I agree with the findings.    JONNATHAN COLEMAN MD         SYSTEM ID:  [id number]   XR Chest Port 1 View    Narrative    Portable chest    INDICATION: Hypoxia, new oxygen requirement    COMPARISON: 11/26/2022    FINDINGS: Heart size and shape appear normal. Lungs and pulmonary  vasculature appear unremarkable. Bony structures appear grossly  normal.      Impression    IMPRESSION: Negative portable view. If further detail is needed  comment consider upright PA and lateral  examination when clinical  condition permits.    TAE MORRIS MD         SYSTEM ID:  [id number]   US Lower Extremity Non Vascular Left    Narrative    Exam: US LOWER EXTREMITY NON VASCULAR LEFT    History: 72 years years old. GPC bacteremia, left hip surgery, check  for abscess    COMPARISON: 3/6/2023, radiographs 5/26/2023    Findings:    Focused ultrasound along the left thigh in the area of surgical  incision were performed by sonographer with images stored in PACS.     No sonographic evidence of fluid collection. Drain in place.      Impression    Impression: No sonographic evidence of fluid collection.    IDA KING         SYSTEM ID:  [id number]       Pending Results   These results will be followed up by [facility] team, ID   Unresulted Labs Ordered in the Past 30 Days of this Admission     Date and Time Order Name Status Description    6/17/2023  9:16 AM Urine Culture In process     6/17/2023 12:02 AM Blood Culture Arm, Left In process     6/17/2023 12:02 AM Blood Culture Arm, Left In process     6/16/2023  8:05 AM Blood Culture Hand, Right Preliminary     6/16/2023  8:05 AM Blood Culture Arm, Right Preliminary     6/15/2023  8:19 AM Blood Culture Arm, Right Preliminary     6/15/2023  8:19 AM Blood Culture Hand, Right Preliminary           Primary Care Physician   Jv Felix    Discharge Disposition   Discharged to [facility] acute care bed   Condition at discharge: Good    Code Status   Full Code    No discharge procedures on file.     Discharge Medications   Current Discharge Medication List      CONTINUE these medications which have NOT CHANGED    Details   acetaminophen (TYLENOL) 325 MG tablet Take 2 tablets (650 mg) by mouth every 4 hours as needed for other (For optimal non-opioid multimodal pain management to improve pain control.)    Associated Diagnoses: Prosthetic joint infection, initial encounter (H)      apixaban ANTICOAGULANT (ELIQUIS) 5 MG tablet Take 5 mg by mouth 2  times daily      atorvastatin (LIPITOR) 40 MG tablet Take 40 mg by mouth daily      ibuprofen (ADVIL/MOTRIN) 600 MG tablet Take 1 tablet (600 mg) by mouth every 6 hours as needed for inflammatory pain    Associated Diagnoses: Infection of prosthetic joint, subsequent encounter      !! insulin aspart (NOVOLOG PEN) 100 UNIT/ML pen Inject 1-7 Units Subcutaneous 3 times daily (before meals)  Qty: 15 mL    Associated Diagnoses: Type 2 diabetes mellitus with other specified complication, unspecified whether long term insulin use (H)      !! insulin aspart (NOVOLOG PEN) 100 UNIT/ML pen Inject 1-5 Units Subcutaneous At Bedtime  Qty: 15 mL    Associated Diagnoses: Type 2 diabetes mellitus with other specified complication, unspecified whether long term insulin use (H)      lactobacillus rhamnosus, GG, (CULTURELL) capsule Take 1 capsule by mouth 2 times daily    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      methocarbamol (ROBAXIN) 500 MG tablet Take 1 tablet (500 mg) by mouth every 6 hours as needed for muscle spasms    Associated Diagnoses: Infection of prosthetic joint, subsequent encounter      oxyCODONE (ROXICODONE) 5 MG tablet Take 1 tablet (5 mg) by mouth every 4 hours as needed for moderate pain  Qty: 26 tablet, Refills: 0    Associated Diagnoses: Infection of prosthetic joint, subsequent encounter      polyethylene glycol (MIRALAX) 17 g packet Take 17 g by mouth daily    Associated Diagnoses: Prosthetic joint infection, initial encounter (H)      senna-docusate (SENOKOT-S/PERICOLACE) 8.6-50 MG tablet Take 1 tablet by mouth 2 times daily    Associated Diagnoses: Infection of prosthetic joint, subsequent encounter       !! - Potential duplicate medications found. Please discuss with provider.          Patient seen and examined on 6/17/2023 in anticipation of discharge on 6/18/23.    Emperatriz WALLACE APRN CNP, personally saw the patient today and spent greater than 30 minutes discharging this patient.    Emperatriz  [facility]  Hospitalist Service   [facility]  Pager: [phone]

## 2023-06-17 NOTE — PLAN OF CARE
Goal Outcome Evaluation:  Hx: Prosthetic Joint Infection         5/26 >S/P Explant of L hip Prosthesis Debridement and Antibiotic Spacer and Left Hip Revision                       Arthroplasty and Reimplantation     Neuro: A/O X 4 ( improved today) , has been confused and lethargic during previous days   Cardiac: BP stable, denies chest discomfort  Resp: O2 at 2 LPM by NC  GI: Incontinent, last BM: 06/16  : Mixed incontinent, able to use urinal  Diet: Regular, BG checks, on sliding scale insulin   Skin/Wounds: Left hip incision BRII/ approximated  LDA's: Lt. Hip GUERRERO drain to bulb suction             No PIV , removed after antibiotic dose given today per patient's report   Other: On Vancomycin IV q24 hrs ( Gram Positive Cocci from blood culture 06/15)   Transfers: Lift device assist x 2, Partial weight bearing to LLE with posterior hip precaution x 3 mos   Labs: UA sample sent today ( see result), UC pending result             Blood culture tested positive again for GPC from sample collected 6/16 ,another Blood culture x 2 collected today, result in             process   Transferring to [address] [place] [address] / Room 32 , with plan for KEILA procedure on 06/19 at [place] also. Handover report done with receiving RN .

## 2023-06-17 NOTE — PHARMACY-VANCOMYCIN DOSING SERVICE
Pharmacy Vancomycin Note  Date of Service 2023  Patient's  1950   72 year old, male    Indication: MRSA Bacteremia, vanco MARCOS</=0.5  Day of Therapy: 3, started 6/15/23  Current vancomycin regimen:  2000 mg (17.4 mg/kg) IV q24h  Current vancomycin monitoring method: AUC  Current vancomycin therapeutic monitoring goal: 400-600 mg*h/L    Insight[employer] Prediction of Current Vancomycin Regimen    Regimen: 2000 mg IV every 24 hours.  Exposure target: AUC24 (range)400-600 mg/L.hr   AUC24,ss: 637 mg/L.hr  Probability of AUC24 > 400: 100 %  Ctrough,ss: 19.2 mg/L  Probability of Ctrough,ss > 20: 44 %  Probability of nephrotoxicity (Lodise HAJA ): 16 %      Current estimated CrCl = Estimated Creatinine Clearance: 72.4 mL/min (A) (based on SCr of 1.19 mg/dL (H)).    Creatinine for last 3 days  2023:  6:05 AM Creatinine 1.14 mg/dL  2023:  8:53 AM Creatinine 1.19 mg/dL    Recent Vancomycin Levels (past 3 days)  2023:  8:53 AM Vancomycin 16.0 ug/mL    Vancomycin IV Administrations (past 72 hours)                   vancomycin (VANCOCIN) 2,000 mg in sodium chloride 0.9 % 500 mL intermittent infusion (mg) 2,000 mg New Bag 23 1041      Restarted 06/15/23 1306     2,000 mg New Bag  1007                Nephrotoxins and other renal medications (From now, onward)    Start     Dose/Rate Route Frequency Ordered Stop    06/15/23 0900  vancomycin (VANCOCIN) 2,000 mg in sodium chloride 0.9 % 500 mL intermittent infusion         2,000 mg  over 2 Hours Intravenous EVERY 24 HOURS 06/15/23 0834               Contrast Orders - past 72 hours (72h ago, onward)    None          Interpretation of levels and current regimen:  Vancomycin level is reflective of AUC greater than 600    Has serum creatinine changed greater than 50% in last 72 hours: No    Urine output:  unable to determine    Renal Function: Stable    [employer]ight[employer] Prediction of Planned New Vancomycin Regimen    Regimen: 1500 mg IV every 24 hours.  Exposure  target: AUC24 (range)400-600 mg/L.hr   AUC24,ss: 484 mg/L.hr  Probability of AUC24 > 400: 86 %  Ctrough,ss: 14.6 mg/L  Probability of Ctrough,ss > 20: 10 %  Probability of nephrotoxicity (Lodise HAJA 2009): 10 %      Plan:  1. Decrease Dose to vancomycin 1500 mg (13 mg/kg) IV q24h   2. Vancomycin monitoring method: AUC  3. Vancomycin therapeutic monitoring goal: 400-600 mg*h/L  4. Pharmacy will check vancomycin levels as appropriate in 3-5 Days.  5. Serum creatinine levels will be ordered daily for the first week of therapy and at least twice weekly for subsequent weeks.    Michael Sosa RPH

## 2023-06-17 NOTE — CONSULTS
[facility]  Orthopedic Surgery Consult    Name: Waldo Bustos  Age: 72 year old  MRN: [id number]  YOB: 1950    Reason for Consult: Concern for left hip prosthetic joint infection    Assessment and Plan:     Assessment:  72-year-old male status post reimplantation of left total hip arthroplasty on 5/26/2023 with Dr. Meyers.  Admitted to the hospital with MRSA bacteremia.  Cultures at time of surgery were negative for growth.  Possible concern for source of left total hip.  Patient reports hip pain has not improved following surgery.  Cannot rule out prosthetic joint infection without sample of joint fluid for culture.    Fluid culture obtained in sterile manner from drain tube and sent to lab.    Plan:  -Follow-up culture of the left hip fluid, if positive we will work on plan for DAIR with Dr. Meyers later this week   -If positive cultures, patient will need to be transferred to [facility] for surgery following his KEILA on Monday  -Anticoagulation plan depending on cultures  -Continue to monitor drain output, if change in color or appearance please page orthopedics with concerns    Nalini Anthony MD  Orthopedic Surgery PGY4  [phone]    History of Present Illness:     Patient was seen and examined by me. History, PMH, Meds, SH, complete ROS (10 organ systems) and PE reviewed with patient and prior medical records.      72-year-old male with history as detailed below who is status post explantation of left hip antibiotic spacer and reimplantation of left total hip arthroplasty on 5/26 with Dr. Meyers.     Following his admission he was admitted to TCU for rehabilitation due to deconditioning and malnutrition.  At the TCU he has noted to have feelings of malaise and confusion.  Blood cultures were obtained which demonstrated MRSA bacteremia on 3 consecutive days.  This prompted transfer for inpatient admission.     Past Medical History:     Past Medical History:   Diagnosis  Date     Arthritis 5 years ago     Chronic osteoarthritis Not sure     Diabetes (H) 10-12 years ago     DVT (deep venous thrombosis) (H)      Dyslipidemia      Gastroesophageal reflux disease      History of blood transfusion      Iliopsoas abscess (H)      Infection of prosthetic hip joint (H)      JAIR (obstructive sleep apnea)      Pulmonary embolism (H)      RA (rheumatoid arthritis) (H) Not sure     Reduced vision 2-3 years ago    Cataract Surgery on both eyes     Venous insufficiency        Past Surgical History:     Past Surgical History:   Procedure Laterality Date     ARTHROPLASTY REVISION HIP Left 5/26/2023    Procedure: Revision Left Total Hip Arthroplasty;  Surgeon: Rom Meyers MD;  Location: [facility] OR     ASPIRATION NEEDLE HIP Left 4/7/2023    Procedure: ARTHROCENTESIS, LEFT HIP;  Surgeon: Rom Meyers MD;  [facility]: [facility] OR     Cataract       COLONOSCOPY       EGD       IR FINE NEEDLE ASPIRATION W ULTRASOUND  3/6/2023     IR IVC FILTER PLACEMENT      removed     IRRIGATION AND DEBRIDEMENT HIP, PLACE ANTIBIOTIC CEMENT BEADS / SPACER Left 11/22/2022    Procedure: and Reconstruction Using G 20 Prosthetic Antibiotic Cement Spacer;  Surgeon: Rom Meyers MD;  [facility]: [facility] OR     REMOVE ANTIBIOTIC CEMENT BEADS / SPACER HIP Left 5/26/2023    Procedure: Explantation of Left Hip Antibiotic Spacer;  Surgeon: Rom Meyers MD;  [facility] OR     REMOVE HARDWARE ARTHROPLASTY HIP Left 11/22/2022    Procedure: Explantation of Chronic  Infected Left Total Hip Arthroplasty, Extended Trochanteric Osteotomy with Extensive Debridement;  Surgeon: Rom Meyers MD;  [facility] OR     SYNOVIAL BIOPSY HIP Left 4/7/2023    Procedure: BIOPSY, SYNOVIUM, LEFT  HIP, percutaneous;  Surgeon: Rom Meyers MD;  Location: [facility] OR     [facility] PELVIS/HIP JOINT SURGERY UNLISTED       [facility] STOMACH SURGERY PROCEDURE UNLISTED  1954    2 Hernia surgeries as a child       Social History:     Social History     Socioeconomic  History     Marital status:    Tobacco Use     Smoking status: Never     Smokeless tobacco: Never   Substance and Sexual Activity     Alcohol use: No     Drug use: No     Sexual activity: Never       Family History:     Family History   Adopted: Yes   Problem Relation Age of Onset     Diabetes No family hx of      Rheumatoid Arthritis No family hx of      Low Back Problems No family hx of      Unknown/Adopted No family hx of        Medications:     Current Facility-Administered Medications   Medication     acetaminophen (TYLENOL) tablet 650 mg     [Held by provider] apixaban ANTICOAGULANT (ELIQUIS) tablet 5 mg     [START ON 6/18/2023] atorvastatin (LIPITOR) tablet 40 mg     glucose gel 15-30 g    Or     dextrose 50 % injection 25-50 mL    Or     glucagon injection 1 mg     insulin aspart (NovoLOG) injection (RAPID ACTING)     insulin aspart (NovoLOG) injection (RAPID ACTING)     lactobacillus rhamnosus (GG) (CULTURELL) capsule 1 capsule     lidocaine (LMX4) cream     lidocaine 1 % 0.1-1 mL     melatonin tablet 1 mg     methocarbamol (ROBAXIN) tablet 500 mg     naloxone (NARCAN) injection 0.2 mg    Or     naloxone (NARCAN) injection 0.4 mg    Or     naloxone (NARCAN) injection 0.2 mg    Or     naloxone (NARCAN) injection 0.4 mg     [START ON 6/18/2023] polyethylene glycol (MIRALAX) Packet 17 g     senna-docusate (SENOKOT-S/PERICOLACE) 8.6-50 MG per tablet 1 tablet     sodium chloride (PF) 0.9% PF flush 3 mL     sodium chloride (PF) 0.9% PF flush 3 mL     traMADol (ULTRAM) tablet 50 mg       Allergies:     Allergies   Allergen Reactions     Sulfa Antibiotics      Other reaction(s): *Unknown - Childhood Rxn     Tizanidine Other (See Comments)     Frequent urination; causes drowsiness, and dry mouth         Review of Systems:     A comprehensive 10 point review of systems (constitutional, ENT, cardiac, peripheral vascular, respiratory, GI, , musculoskeletal, skin, neurological) was performed and found to be  negative except as described in this note.      Physical Exam:     Vital Signs: /66 (BP Location: Right arm)   Pulse 92   Temp 98.3  F (36.8  C) (Oral)   Resp 16   SpO2 97%   General: Resting comfortably in bed, awake, alert, cooperative, no apparent distress, appears stated age.  HEENT: Normocephalic, atraumatic, EOMI, no scleral icterus.  Cardiovascular: RRR assessed by peripheral pulse.  Respiratory: Breathing comfortably on room air, no wheezing.  Skin: No rashes or lesions.  Neurologic: A&Ox3, CN II-XII grossly intact  Musculoskeletal:  LLE: Incision appears to be healing well.  There is no evidence of drainage.  There is no erythema surrounding the incision.  Drain output is sanguinous in nature. SILT in femoral, sural, saphenous, deep peroneal, superficial peroneal, and tibial nerve distributions. Dorsalis pedis and posterior tibial arteries 2+ and foot warm and well-perfused with <2 seconds capillary refill.  Patient has baseline foot drop and lacks EHL and tib ant function.  FHL and gastroc remain intact.  Does have hyperalgesia about the foot.     Imaging:     Left hip radiographs pending.    Data:     CBC:  Lab Results   Component Value Date    WBC 13.8 (H) 06/17/2023    HGB 8.3 (L) 06/17/2023     (H) 06/17/2023     BMP:  Lab Results   Component Value Date     06/16/2023    POTASSIUM 4.2 06/16/2023    CHLORIDE 104 06/16/2023    CO2 24 06/16/2023    BUN 27.7 (H) 06/16/2023    CR 1.19 (H) 06/17/2023    ANIONGAP 13 06/16/2023    MAIKOL 8.3 (L) 06/16/2023     (H) 06/17/2023     Inflammatory Markers:  Lab Results   Component Value Date    WBC 13.8 (H) 06/17/2023    WBC 12.3 (H) 06/16/2023    WBC 14.1 (H) 06/14/2023    CRP 3.6 02/06/2023    CRP <2.9 01/30/2023    CRP <2.9 01/23/2023    SED 77 (H) 01/09/2023    SED 86 (H) 12/19/2022

## 2023-06-18 NOTE — PLAN OF CARE
/81 (BP Location: Right arm, Patient Position: Semi-Fuentes's, Cuff Size: Adult Regular)   Pulse 84   Temp 98.4  F (36.9  C) (Oral)   Resp 18   SpO2 99%      Neuro: A&O x 4. Reports numbness and tenderness in LLE.   Resp: WDL on RA . Denies SOB.   Cardiac: WDL. Denies cardiac chest pain.   GI/: Combination regular Moderate consistent carb (60 gram CHO per meal.)   Skin: No new defivit noted  Activity: Lift, assist of 2. Refused repositioning.   Incision & Drainage: PIV SL. Left thigh GUERRERO drain and surgical incision.   Pain: 9/10 hip, foot and ankle pain. PRN tramadol x1 given.   Labs: BG ACHS, sliding scale  insulin given  Plan: Continue with POC

## 2023-06-18 NOTE — PROGRESS NOTES
[facility]    Medicine Progress Note - Hospitalist Service, GOLD TEAM 11    Date of Admission:  6/17/2023    Assessment & Plan   Waldo Bustos is a 72 year old male with a history of type 2 diabetes, hyperlipidemia, chronic back pain, JAIR, DVT/PE on DOAC, and chronic left hip prosthetic joint infection initially admitted to [facility] on 11/22/2022 for scheduled explant of left hip prosthesis, debridement, and reconstruction with antibiotic spacer.  Postop course complicated by profound deconditioning.  He was admitted to TCU initially on 12/23/2022 for physical rehabilitation, however therapies no longer worked with patient after several months of an in bed therapy and his refusals to mobilize.  Underwent biopsy on 4/7/2023 without evidence of infection.  He was transferred back to [facility] for left total hip arthroplasty and reimplantation on 5/26/23 with Dr. Meyers.  Presented again to [facility] for persistent MRSA bacteremia on 6/17.     Plan for today:   - KEILA planned Monday 6/19   - Blood cultures 6/17 (2/2) positive   - Surveillance cultures 6/19.    - Synovial fluid cultures ngtd   - Appreciate ID and Ortho assistance    #MRSA bacteremia  #Leukocytosis Blood cultures: 6/14, 6/15 + for MRSA (4/4), and 6/16 (1/2 GPCs) and 6/17 (2/2) GPCs. - started vancomycin 6/15.   6/17 sinovial fluid cultures ngtd.  Admision Chest XR normal. .16, WBC 14.1, PCL 0.30. BNP 2971- normal ECHO 5/31/23. At the TCU purulent drainage around drain site was noted incision c/d/i no redness. Denies increase in pain.   - Continue vancomycin   - KEILA planned for Monday   - Repeat cultures tomorrow and q48 until clear   - Trend inflammatory markers q48h   - Appreciate ID assistance  -  most likely source hip, consulted ortho who obtained cultures from drain on 6/17/2023     # S/p left hip explantation of left hip antibiotic spacer and revision of left total hip  arthroplasty (5/26/2023)  - surgery by Dr. Rahman's team.  Developed left foot drop postoperatively.  AFOs ordered but patient has been declining to use these.  OR cultures no growth to date.  Pain has overall been well controlled.   - consulted ortho due to hip as potential source  - follow up left hip x-ray (recommended by ortho on 6/17)  -Activity: 50% PWB LLE with posterior hip precautions [id number]  -AFO ordered for foot drop but patient refusing to use  -PT/OT  -Knee to be kept even in a foam leg elevator to relax sciatic nerve  -Pain: Scheduled robaxin 4 times a day, APAP PRN                 -oxycodone  to 5-7.5 mg discontinued                 -start tramadol 25-50 mg every 6 hours PRN   -see above     # Hx of unprovoked DVT in 2018   - on eliquis 5 mg bid outpatient  - continue eliquis, may have to hold if procedures     #Confusion  #Altered mental status  Has had intermittent confusion since Monday morning. Stated he thought there were multiple people in his room overnight. Originally was easily reoriented. Improved after discontinuation of narcotics  - delirium precuations      #Type 2 diabetes mellitus  - Last hemoglobin A1c 5.6% on 6/16/2023.  On glipizide, metformin, Actos, and Victoza prior to hospitalizations and surgery.   - continue ISS    #Constipation  -Continue Colace 100 mg twice daily  -Continue senna 1 tablet twice daily  -Continue MiraLAX daily     #Hypomagnesemia  -monitor and replete      #Severe malnutrition in the context of acute on chronic illness  Goal for patient is to consume % of meals TID. Goal is 1264-5159 kcals/daily. Protein needs- 100-126 grams/daily. Corrected calcium 9.3.  Plan  -RD consult and appreciate recs  -regular diet  -Weekly weights  -I/Os      #Adjustment disorder with depressed mood  #Prolonged grief disorder  Patient has been with depressed mood in setting of ongoing health issues, as evidenced by lack of motivation to work with therapies during both TCU  "admissions.  Past history of passive suicidal statements. Psychiatry saw patient during hospital admission on 5/31 due to lack of motivation with therapies..  Patient declined interest in starting any psychiatric medications.  Willing to engage with health psychology.   -Outpatient health psychology consult  -Continue to monitor for willingness to start antidepressant therapy     Stable and Resolved Issues:  #Hyperlipidemia-continue PTA Lipitor 40 mg daily  #JAIR-CPAP when sleeping     Diet: Combination Diet Regular Diet Adult; Moderate Consistent Carb (60 g CHO per Meal) Diet  NPO per Anesthesia Guidelines for Procedure/Surgery Except for: Meds  Snacks/Supplements Adult: Other; Glucerna with bkf trays daily; With Meals  Room Service    DVT Prophylaxis: DOAC  Tran Catheter: Not present  Lines: None     Cardiac Monitoring: None  Code Status: Full Code      Clinically Significant Risk Factors Present on Admission               # Drug Induced Coagulation Defect: home medication list includes an anticoagulant medication         # Obesity: Estimated body mass index is 36.45 kg/m  as calculated from the following:    Height as of 5/26/23: 1.778 m (5' 10\").    Weight as of 6/16/23: 115.2 kg (254 lb).    # Moderate Malnutrition: based on nutrition assessment          Disposition Plan     Expected Discharge Date: 06/19/2023                  Cali Gomes MD  Hospitalist Service, GOLD TEAM 11  [facility]  Securely message with [employer] (more info)  Text page via [employer] Paging/Directory   See signed in provider for up to date coverage information  ______________________________________________________________________    Interval History   Patient seen and examined.  No acute overnight events.  Pain is controlled.  Denies HA, dizziness, CP, N/V, abdominal pain or other symptoms currently.      Physical Exam   Vital Signs: Temp: 98.4  F (36.9  C) (pt. refused vitals) Temp src: " Oral BP: 137/81 Pulse: 84   Resp: 18 SpO2: 99 % O2 Device: Nasal cannula Oxygen Delivery: 1 LPM  Weight: 0 lbs 0 oz    General Appearance: NAD  Respiratory: CTA b/l  Cardiovascular: RRR S1/S2, no m,r,g  GI: soft, NT, ND, +BS  Skin: no rashes  Other: No edema     Medical Decision Making       60 MINUTES SPENT BY ME on the date of service doing chart review, history, exam, documentation & further activities per the note.      Data     I have personally reviewed the following data over the past 24 hrs:    11.1 (H)  \   7.3 (L)   / 417     138 104 28.9 (H) /  178 (H)   4.1 24 1.05 \       Imaging results reviewed over the past 24 hrs:   No results found for this or any previous visit (from the past 24 hour(s)).

## 2023-06-18 NOTE — PROGRESS NOTES
ID brief note    Pt being currently followed by ID team (last by Johnny Mercado and Shira on [facility]), will discontinue new consult order. Now transferred to [facility].     71yo M history of chronic left hip PJI, most recently s/p revision left THAon 5/26, who has been in TCU since discharge on 6/2. Now with MRSA bacteremia with confusion and somnolence.  Hip site has been ok,maybe some drainage staining on the dressing at the GUERRERO drain site. U/S of the leg (didn't tolerate a CT) w no signs of abscess. May still warrant a CT once he is less confused.    ID also recommended echo, cardiology recommended KEILA because they had a hard time getting good windows on a prior TTE. Thus was transferred to [facility] on 6/17.      Noted plan for NPO at midnight 6/18 for KEILA 6/19    Currently on Vanc    Blood cultures remain persistently positive for MRSA ( 6/14-6/17) but seems slowing down. No cultures done  Today, primary planning cultures tomorrow    Most likely source hip, ortho consulted, and they obtained cultures from drain on 6/17/2023. Per ortho, plan appears to be to follow-up culture of the left hip fluid, if positive to work on plan for DAIR with Dr. Meyers later this week. If positive cultures, patient will need to be transferred to [facility] for surgery following his KEILA on Monday    Recs  - Continue Vanc  - Follow repeat blood cultures, noted cultures planned for 6/19, agree  - Await KEILA planned tomorrow  - Follow ortho recs  - May still warrant a CT hip once he is less confused  - Addendum: Noted Hip fluid with new GNB and SA. Given this was a drain culture, his white count has been trending down without covering the GNB and he seems overall clinically stable will hold off on gram neg coverage at this time to improve culture yield of anticipated hip surgery. If he decompensates, can add Ceftriaxone and Flagyl (avoiding Cefepime due to confusion, and he previous grew proteus which this likely is).     ID will  follow    I have reviewed interval events, vital, labs, images, cultures and antibiotics    Deidre Jain  Staff Physician  Division of Infectious Diseases

## 2023-06-18 NOTE — PLAN OF CARE
Occupational Therapy Discharge Summary    Reason for therapy discharge:    Discharge to [facility] for planned procedure    Progress towards therapy goal(s). See goals on Care Plan in [facility] electronic health record for goal details.  Goals not met.  Barriers to achieving goals:   pts behavior self limiting, pain,low tolerance for therapy.    Therapy recommendation(s):  per nsging, pt   Transferred to [address] [place] [address] / Room 32 , with plan for KEILA procedure on 06/19 at [place] also. While on TCU pt had therapy goals but his partic and progress limited by pt;s behavior, difficult to reason w/ pt regarding the importance of therapy to progress toward pt's stated goals. Pt cont to have therapy needs but self limiting by behavior. Recommend OT if pt agreeable to behavior plan re: engagement in therapy and initiating getting assist from nsg for pericares/clean up after incontinence and requesting pain meds prior to established therapy times. .

## 2023-06-18 NOTE — PLAN OF CARE
"Received orders for PT to evaluate and treat following discharge from TCU. Patient adamant to return home and reports \"well nothing that's been done has helped so I want to go home.\" Discussed need for PT to assess mobility to be able to recommend any equipment or assist needed to safely return home. 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 When asked about ADL's especially eating and walking, patient reported \"yeah I help her with all of it.\" Discussed who was helping patient's girlfriend at home with patient being in hospital; patient reported \"she has a friend who can help her when she has the time but otherwise she will make do.\" Extensive time spent educating patient on benefits of participating with PT to improve strength and safety prior to returning home and discussed sitting on edge of bed during PT evaluation. Patient continued to refuse to participate stating \"what's the use when they need to figure out my infection.\" Patient then began reporting that he \"begged management of PT/OT staff over the TCU for therapy to see him while he was there and no one ever did.\" Discussed recent refusals to participate with PT/OT at TCU; patient reported \"big fucking deal I did it one time in 6 months.\" Patient also reported his insurance claimed they would no longer cover TCU. With further discussion of mobilizing with PT on this date, patient reported \"my leg is in too much pain.\" When asked if patient would prefer to be left alone, patient reported \"yes.\" When asked if patient would like therapy while in hospital, patient reported \"no.\" Reported to RN with encouragement to transfer patient up to recliner using OH lift and extension loops on sling to maintain precautions. Reported to SW especially with safety concerns for girlfriend being unable to care for herself while " patient in hospital. Will initiate PT evaluation as appropriate during stay.     Julia, PT. Pager [phone]

## 2023-06-18 NOTE — PROGRESS NOTES
CLINICAL NUTRITION SERVICES - ASSESSMENT NOTE     Nutrition Prescription    RECOMMENDATIONS FOR MDs/PROVIDERS TO ORDER:  Continue with diet as tolerated     Malnutrition Status:    Moderate malnutrition in the context of acute presentation     Recommendations already ordered by Registered Dietitian (RD):  Ordered Glucerna with bkf trays   Ordered not appropriate for room service to assure patient is receiving 3 meals per day     Future/Additional Recommendations:  PO adequacy  Cognitive status / altered mentation       REASON FOR ASSESSMENT  Waldo Bustos is a/an 72 year old male assessed by the dietitian for Provider Order - followed at TCU for malnutrition    Chart reviewed:  PMH: DM, DVT/PE, JAIR on CPAP when sleeping, Infection of prosthetic hip joint   - Chronic left hip prosthesis joint infection. S/p explant of L hip prosthesis debridement and antibiotic spacer and left hip revision arthroplasty and reimplantation 23.  - Complicated by deconditioning and malnutrition. Patient subsequently admitted to TCU.     - Past 2 days patient developed AMS, hypoxia, and blood cultures were positive for MRSA bacteremia. Patient started on IV vancomycin 6/15.   - Left hip ultrasound negative for abscess/fluid. He is recommended for KEILA  -> transferred to [place] on . Plan for Transesophageal Echocardiogram-       NUTRITION HISTORY  Unable to obtain due to patient's status. Visited with patient today. Per patient, he has not had any bkf today. Has not ordered any lunch yet. Patient was not interested in placing lunch order today. Not interested in Ensure oral supplements.       CURRENT NUTRITION ORDERS  Diet: Moderate Consistent Carbohydrate ( 60 gm carbs per meal)  - NPO at midnight  for KEILA     Intake/Tolerance: ate 100% of lunch yesterday. No other intake data recorded.     LABS  CRP: 256 (H) on , WBC: 11.1 (H) today   B (H), A1c 5.6% on 2023 -> started in sliding scale insulin    BUN: 28.9 (H)< Cr: 1.05, GFR: 76      MEDICATION  Medium sliding scale NovoLog  Bowel regimen for constipation     ANTHROPOMETRICS  Height: 0 cm (Data Unavailable) - 177.8 cm   Most Recent Weight: 115.2 kg on 6/16 (prior to admission)   IBW: 75.5 kg ( 152% IBW)  BMI: 36.45 kg /m2  Obesity Grade II BMI 35-39.9  Weight History: 22% wt loss over the past 7 month ( stable over the past month)  Wt Readings from Last 10 Encounters:   06/16/23 115.2 kg (254 lb)   05/26/23 110.4 kg (243 lb 4.8 oz)   05/19/23 110.4 kg (243 lb 4.8 oz)   03/23/23 117 kg (257 lb 14.4 oz)   01/09/23 119.7 kg (264 lb)   11/22/22 147.1 kg (324 lb 4.8 oz)   11/14/22 142.9 kg (315 lb)   11/09/22 146.8 kg (323 lb 9.6 oz)   05/19/22 136.1 kg (300 lb)   07/03/18 148.9 kg (328 lb 4.8 oz)       Dosing Weight: 85 kg adjusted wt from most recent wt of 115.2 kg ( 6/16 ) and IBW of 75.5 kg     ASSESSED NUTRITION NEEDS  Estimated Energy Needs: 1700- 2125 kcals/day (20 - 25 kcals/kg)  Justification: Maintenance  Estimated Protein Needs: 85- 102 grams protein/day (1 - 1.2 + grams of pro/kg)  Justification: Maintenance, + for hypercatabolism with infection / inflammation   Estimated Fluid Needs:  (1 mL/kcal)   Justification: Maintenance    PHYSICAL FINDINGS  2+ edema left lower extremity, 1+ edema right lower extremity    MALNUTRITION  % Intake: Decreased intake does not meet criteria  % Weight Loss: > 10% in 6 months (severe)  Subcutaneous Fat Loss: None observed  Muscle Loss: Upper arm (bicep, tricep): Moderate   Fluid Accumulation/Edema: Mild  Malnutrition Diagnosis: Moderate malnutrition in the context of acute presentation     NUTRITION DIAGNOSIS  Inadequate oral intake related to decrease appetite as evidenced by limited intake since admit     INTERVENTIONS  Implementation  Nutrition Education: Not appropriate at this time due to patient condition   Medical food supplement therapy     Goals  Patient to consume % of nutritionally adequate meal trays  TID, or the equivalent with supplements/snacks.     Monitoring/Evaluation  Progress toward goals will be monitored and evaluated per protocol.      Eduardo Bocanegra RD/EMMETT  7B RD pager: [phone]  Weekend/Holiday RD pager: [phone]

## 2023-06-18 NOTE — CONSULTS
Care Management Initial Consult    General Information  Assessment completed with: Patient,  (Don)  Type of CM/SW Visit: Initial Assessment    Primary Care Provider verified and updated as needed: Yes   Readmission within the last 30 days:        Reason for Consult: discharge planning  Advance Care Planning:            Communication Assessment  Patient's communication style: spoken language (English or Bilingual)    Hearing Difficulty or Deaf: no        Cognitive  Cognitive/Neuro/Behavioral: WDL  Level of Consciousness: alert  Arousal Level: opens eyes spontaneously  Orientation: oriented x 4  Mood/Behavior: uncooperative  Best Language: 0 - No aphasia  Speech: clear, spontaneous    Living Environment:   People in home: significant other   (Claire)  Current living Arrangements: house      Able to return to prior arrangements: yes       Family/Social Support:  Care provided by: self  Provides care for: significant other  Marital Status: Domestic Partnership  Partner          Description of Support System: Supportive    Support Assessment: Adequate social supports    Current Resources:   Patient receiving home care services:       Community Resources:    Equipment currently used at home:    Supplies currently used at home:      Employment/Financial:  Employment Status: retired        Financial Concerns: No concerns identified   Referral to Financial Worker: No       Does the patient's insurance plan have a 3 day qualifying hospital stay waiver?  No    Lifestyle & Psychosocial Needs:  Social Determinants of Health     Tobacco Use: Low Risk  (5/30/2023)    Patient History      Smoking Tobacco Use: Never      Smokeless Tobacco Use: Never      Passive Exposure: Not on file   Alcohol Use: Not on file   Financial Resource Strain: Not on file   Food Insecurity: Not on file   Transportation Needs: Not on file   Physical Activity: Not on file   Stress: Not on file   Social Connections: Not on file   Intimate Partner Violence:  Not on file   Depression: At risk (4/24/2023)    PHQ-2      PHQ-2 Score: 6   Housing Stability: Not on file       Functional Status:  Prior to admission patient needed assistance:              Mental Health Status:  Mental Health Status: Current Concern (He denied needing additional assistance with his mental health)       Chemical Dependency Status:  Chemical Dependency Status: No Current Concerns             Values/Beliefs:  Spiritual, Cultural Beliefs, Mormonism Practices, Values that affect care:                 Additional Information:    Rahat was admitted to [facility] on 6/17/2023 from TCU. I met with him at bedside to complete CMA. He will likely need TCU when medically ready for discharge. He has been refusing cares and PT today. When discussing with him at bedside, he reported that his mental health has not been good, especially this morning. I encouraged him to participate in nursing cares and therapies. He understandings that if he does not participate, he cannot make progress to return home. His girlfriend lives at home and needs assistance with her ADLs/IADLs. He indicated that a friend is helpful while he is not at home and that his girlfriend is able to take breaks and manage on her own. He is open to returning to TCU when medically ready. [facility] following.       PARESH Ospina  6/18/2023       Social Work and Care Management Department       SEARCHABLE in [employer] - search SOCIAL WORK       [facility] (0800 - 1630) Saturday and Sunday     Units: 4A, 4C, & 4E Pager: [phone]     Units: 5A & 5B Pager: [phone]     Units: 6A & 6B   Pager: [phone]     Units: 6C & 6D Pager: [phone]     Units: 7A &7B  Pager: [phone]     Units: 7C, 7D, & 5C Pager: [phone]     Unit: [facility] Pager: [phone]      [facility] (3222-2601) Saturday and Sunday      Units: 5 Ortho, 5 Med/Surg & WB ED  Pager:[phone]     Units: 6 Med/Surg, 8A, & 10A ICU  Pager: [phone]         After hours (1630 - 0000) Saturday & Sunday; (4962-5515) Mon-Fri; (5554-4199) [facility] Recognized Holidays     Units: ALL  Pager: [phone]

## 2023-06-18 NOTE — PLAN OF CARE
/63 (BP Location: Right arm)   Pulse 86   Temp 97.8  F (36.6  C) (Oral)   Resp 18   SpO2 96%     Shift: 0802-0878  Status: Status post reimplantation of left total hip arthroplasty on 2023 with Dr. Meyers.  Admitted to the hospital with MRSA bacteremia.  Neuro: A&O x 4. Reports numbness and tenderness in LLE. Left foot drop.  Tenderness in RLE.   Resp: 1 LPM oxygen NC. Denies SOB.   Cardiac: WDL. Denies cardiac chest pain.   GI/: Combination regular Moderate consistent carb (60 gram CHO per meal.)   Skin: Warm and dry.   Activity: Lift, assist of 2. Refused repositioning.   Incision & Drainage: PIV SL. Left thigh GUERRERO drain and surgical incision.   Pain: 9/10 hip, foot and ankle pain. Gave PRN tramadol.   Labs: Creatinine: 1.19. WBC: 13.8. Hgb: 8.3.  B.   Changes: No acute changes. Refused left hip x-ray d/t pain.    Plan: Continue with POC.       Goal Outcome Evaluation:Ongoing.       Plan of Care Reviewed With: patient    Overall Patient Progress: no change.

## 2023-06-18 NOTE — PLAN OF CARE
Goal Outcome Evaluation:      Plan of Care Reviewed With: patient    Overall Patient Progress: no changeOverall Patient Progress: no change    Outcome Evaluation: low appetite on moderate carb diet. Ordered Glucerna once daily.

## 2023-06-19 NOTE — PROGRESS NOTES
Physical Therapy Discharge Summary    Reason for therapy discharge:    Discharged to [place] for procedure    Progress towards therapy goal(s). See goals on Care Plan in [facility] electronic health record for goal details.  Goals not met.  Barriers to achieving goals:   Patient has stated goals but is very self limiting. He agreed to behavior plan during most recent TCU stay but then did not follow it. For example he agreed to sit up in recliner 3 times per day for 2 hours a time but then did not follow through. .    Therapy recommendation(s):    Patient would benefit from therapy if he would agree to participate and follow therapist's recommendations. Pt states he has high levels of pain but does not seem to understand that some of that could be improved by moving more. He has not stood since November 2022 and PT attempted standing frame and tilt table. Pt unable to perform tilt table due to pain with flexing knee and pt refused tilt table after a couple attempts saying he did not see the benefit. Pt continues to have therapy needs but is very self limiting. Recommend PT if pt is agreeable to a behavior plan including engagement in therapy, initiating assistance from nursing from cares and requesting pain meds prior to therapy.

## 2023-06-19 NOTE — PROGRESS NOTES
[facility] ID Service: Follow Up Note      Patient:  Waldo Bustos, Date of birth 1950, Medical record number [id number]  Date of Visit:  June 19, 2023         Assessment and Recommendations:   Problem List:  1. MRSA bacteremia   -6/14 blood cultures: 4 of 4 bottles positive  -6/15 blood cultures: 4 of 4 bottles positive  -No lines/central access present  2. Chronic PJI  -Multiple surgeries, most recent being revision left THE on 5/26/23  -GUERRERO drain in place  3. DM2  4. RA  5. Venous insufficiency    Discussion:  Pt who was followed by ID team at  (last by Johnny Mercado and Shira on [facility]), Now transferred to [place].      73yo M history of chronic left hip PJI, most recently s/p revision left BELLA on 5/26, who has been in TCU since discharge on 6/2. Now with MRSA bacteremia with confusion and somnolence.  Hip site has been ok,maybe some drainage staining on the dressing at the GUERRERO drain site. U/S of the leg (didn't tolerate a CT) w no signs of abscess.    ID also recommended echo, cardiology recommended KEILA because they had a hard time getting good windows on a prior TTE. Thus was transferred to [place] on 6/17.   Currently on Vanc. Blood cultures remained persistently positive for MRSA ( 6/14-6/17) but seems slowing down. Most likely source hip, ortho consulted, and they obtained cultures from drain on 6/17/2023. Per ortho, plan appears to be to follow-up culture of the left hip fluid, if positive to work on plan for DAIR with Dr. Meyers later this week, and transfer back to  for this. Noted Hip fluid with new GNB and SA 6/18. Given this was a drain culture, his white count has been trending down without covering the GNB and he seems overall clinically stable will hold off on gram neg coverage at this time to improve culture yield of anticipated hip surgery.     Xray pelvis today with subcutaneous emphysema projecting over the joint space that may be secondary to surgical drain versus  infection    CT hip without C today with Soft tissue stranding and multiple foci of subcutaneous emphysema about the left hip, especially anteriorly, partially visualized fluid collection within subcutaneous fat superficial-medial to the proximal adductor longus (caudal extent of which is incompletely visualized) which appears enlarged compared to outside CT 10/25/2022. Infection/abscess not excluded.     No cultures done 6/18, Blood cultures from today 6/19 pending.     KEILA with No vegetation or mass identified. No significant valvular abnormalities were Noted.    Hip fluid culture now speciated as SA, corynebacterium(both covered by Vanc) and Klebsiella pneumoniae    Recommendations:  - Continue Vanc  - Follow repeat blood cultures  - Follow ortho recs  - If he decompensates, can add Ceftriaxone. But given  was a drain culture, his white count has been trending down without covering the Klebiella and he seems overall clinically stable, and wound site looks good will hold off on gram neg coverage at this time to improve culture yield of anticipated hip surgery.   - Noted plan to transfer back to .Please re-consult ID when at   - If hip surgery is delayed, would add Ceftriaxone. If not, postop start vanc and Ceftriaxone after operative cultures     I have reviewed interval events, vital, labs, images, cultures and antibiotics. WIll plan to provide signout to  ID provider    Total time on day of visit including chart review, visit, counseling, documentation and coordination of care: 45 minutes    Deidre Jain  Staff Physician  Division of Infectious Diseases             Interval History:     Afebrile, stable. Wbc 12-->13.8-->11-->    Labs pending, blood cultures from today pending.    On Vanc    Reports feeling better after pain meds, denies fevers or chills, history limited, keeps eyes closed,. RN by bedside. Had KEILA and CT hip today           Current Antimicrobials     Vancomycin         Physical Exam:    Ranges for vital signs:  Temp:  [97.1  F (36.2  C)-98.1  F (36.7  C)] 97.1  F (36.2  C)  Pulse:  [77-86] 77  Resp:  [18-20] 20  BP: (111-143)/(58-84) 133/84  SpO2:  [76 %-99 %] 99 %    Intake/Output Summary (Last 24 hours) at 6/16/2023 1115  Last data filed at 6/15/2023 2239  Gross per 24 hour   Intake 50 ml   Output --   Net 50 ml     Exam:  GENERAL:  well-developed, well-nourished, somnolent but responds   ENT:  Head is normocephalic, atraumatic. Oropharynx is moist without exudates or ulcers.  EYES:  Eyes have anicteric sclerae.    LUNGS:  Breathing comfortably  EXT: Extremities warm and without edema. Left hip with GUERRERO drain in place, serosanguinous fluid in line and collection vessel.  SKIN:  No acute rashes. Suurgical site with no obvious erythema or purulence  NEUROLOGIC:  Grossly nonfocal, though confused and somnolent         Laboratory Data:   Reviewed.  Pertinent for:    Microbiology:  Culture   Date Value Ref Range Status   06/17/2023 Culture in progress  Preliminary   06/17/2023 4+ Staphylococcus aureus (A)  Preliminary   06/17/2023 1+ Gram negative bacilli (A)  Preliminary   06/17/2023 No anaerobic organisms isolated after 1 day  Preliminary   06/17/2023 Positive on the 1st day of incubation (A)  Preliminary   06/17/2023 Gram positive cocci in clusters (AA)  Preliminary     Comment:     1 of 2 bottles   06/17/2023 Positive on the 1st day of incubation (A)  Preliminary   06/17/2023 Gram positive cocci in clusters (AA)  Preliminary     Comment:     1 of 2 bottles   06/17/2023 <10,000 CFU/mL Urogenital marimar  Final   06/16/2023 Positive on the 1st day of incubation (A)  Final   06/16/2023 Staphylococcus aureus MRSA (AA)  Final     Comment:     1 of 2 bottlesSusceptibilities done on previous cultures   06/16/2023 Positive on the 1st day of incubation (A)  Final   06/16/2023 Staphylococcus aureus MRSA (AA)  Final     Comment:     1 of 2 bottlesSusceptibilities done on previous cultures   06/15/2023  Positive on the 1st day of incubation (A)  Final   06/15/2023 Staphylococcus aureus MRSA (AA)  Final     Comment:     2 of 2 bottlesSusceptibilities done on previous cultures   06/15/2023 Positive on the 1st day of incubation (A)  Final   06/15/2023 Staphylococcus aureus MRSA (AA)  Final     Comment:     2 of 2 bottlesSusceptibilities done on previous cultures   06/14/2023 Positive on the 1st day of incubation (A)  Final   06/14/2023 Staphylococcus aureus MRSA (AA)  Final     Comment:     2 of 2 bottlesSusceptibilities done on previous cultures   06/14/2023 Positive on the 1st day of incubation (A)  Final   06/14/2023 Staphylococcus aureus MRSA (AA)  Final     Comment:     2 of 2 bottles     05/26/2023 No anaerobic organisms isolated  Final   05/26/2023 No anaerobic organisms isolated  Final   05/26/2023 No anaerobic organisms isolated  Final   05/26/2023 No anaerobic organisms isolated  Final   05/26/2023 No Growth  Final   05/26/2023 No Growth  Final   05/26/2023 No Growth  Final   05/26/2023 No Growth  Final   05/26/2023 No anaerobic organisms isolated  Final   05/26/2023 No Growth  Final   04/07/2023 No anaerobic organisms isolated  Final   04/07/2023 No Growth  Final   04/07/2023 No anaerobic organisms isolated  Final   04/07/2023 No Growth  Final   04/07/2023 No anaerobic organisms isolated  Final   04/07/2023 No Growth  Final   04/07/2023 No anaerobic organisms isolated  Final   04/07/2023 No Growth  Final   04/07/2023 No anaerobic organisms isolated  Final   04/07/2023 No Growth  Final   04/07/2023 No anaerobic organisms isolated  Final   04/07/2023 No Growth  Final   11/22/2022 4+ Finegoldia magna (A)  Final     Comment:     Susceptibilities done on previous cultures   11/22/2022 No Growth  Final   11/22/2022 1+ Proteus mirabilis (A)  Final     Comment:     Susceptibilities done on previous cultures   11/22/2022 1+ Finegoldia magna (A)  Final     Comment:     Susceptibilities done on previous cultures    11/22/2022 No Growth  Final   11/22/2022 1+ Proteus mirabilis (A)  Final   11/22/2022 No anaerobic organisms isolated  Final   11/22/2022 No anaerobic organisms isolated  Final   11/22/2022 No anaerobic organisms isolated  Final   11/22/2022 No Growth  Final   11/22/2022 No Growth  Final   11/22/2022 No Growth  Final   11/22/2022 Isolated in broth only Proteus mirabilis (A)  Final     Comment:     On day 1 of incubationSusceptibilities done on previous cultures   11/22/2022 Isolated in broth only Proteus mirabilis (A)  Final     Comment:     On day 1 of incubationSusceptibilities done on previous cultures   11/22/2022 1+ Proteus mirabilis (A)  Final   11/22/2022 No anaerobic organisms isolated  Final   11/22/2022 No Growth  Final   11/22/2022 1+ Proteus mirabilis (A)  Final     Comment:     Susceptibilities done on previous cultures   11/14/2022 4+ Porphyromonas species (A)  Final     Comment:     Beta Lactamase Positive  Identification obtained by MALDI-TOF mass spectrometry research use only database. Test characteristics determined and verified by the Infectious Diseases Diagnostic Laboratory.   11/14/2022 1+ Finegoldia magna (A)  Corrected   11/14/2022 3+ Proteus mirabilis (A)  Final   11/14/2022 No Growth  Final     Metabolic Studies       Recent Labs   Lab Test 06/19/23  0622 06/18/23  1736 06/18/23  1106 06/18/23  0728 06/18/23  0611 06/17/23  2205 06/17/23  2033 06/17/23  1122 06/17/23  0853 06/16/23  0820 06/16/23  0605 06/14/23  0742 06/14/23  0651 06/10/23  1809 06/10/23  1444 05/31/23  0805 05/31/23  0630 05/30/23  0929 05/30/23  0643 03/21/23  0810 03/20/23  1919 01/10/23  1710 01/10/23  1105   NA  --   --   --  138  --   --   --   --   --   --  141  --  140  --  139  --  139  --  138   < >  --    < >  --    POTASSIUM 4.0  --   --  4.1  --   --   --   --   --   --  4.2  --  4.4  --  4.1  --  4.0  --  4.0   < >  --    < >  --    CHLORIDE  --   --   --  104  --   --   --   --   --   --  104  --  102  --   103  --  104  --  104   < >  --    < >  --    CO2  --   --   --  24  --   --   --   --   --   --  24  --  23  --  25  --  25  --  25   < >  --    < >  --    ANIONGAP  --   --   --  10  --   --   --   --   --   --  13  --  15  --  11  --  10  --  9   < >  --    < >  --    BUN  --   --   --  28.9*  --   --   --   --   --   --  27.7*  --  24.5*  --  17.2  --  26.0*  --  28.5*   < >  --    < >  --    CR  --   --   --  1.05  --   --   --   --  1.19*  --  1.14  --  1.15  --  1.10  --  0.90  --  0.94   < >  --    < >  --    GFRESTIMATED  --   --   --  76  --   --   --   --  65  --  69  --  68  --  72  --  >90  --  87   < >  --    < >  --    GLC  --  170* 178* 189* 174* 166* 180*   < >  --    < > 171*   < > 146*   < > 119*   < > 129*   < > 135*   < >  --    < >  --    A1C  --   --   --   --   --   --   --   --   --   --  5.6  --   --   --   --   --   --   --   --   --   --    < >  --    MAIKOL  --   --   --  8.4*  --   --   --   --   --   --  8.3*  --  8.2*  --  8.3*  --  8.2*  --  8.0*   < >  --    < >  --    MAG  --   --   --  1.8  --   --   --   --  1.9  --  1.7  --  1.6*   < > 1.3*  --   --   --   --   --   --   --   --    LACT  --   --   --   --   --   --   --   --   --   --   --   --   --   --   --   --   --   --   --   --  1.3  --  0.9    < > = values in this interval not displayed.     Hepatic Studies    Recent Labs   Lab Test 06/16/23  0605 06/14/23  0651 05/31/23  0630 05/26/23  0753 03/30/23  1054 12/04/22  1035   BILITOTAL 0.2 0.2 0.2 0.3 0.2 0.3   ALKPHOS 114 110 74 92 80 74   ALBUMIN 2.8* 2.8* 3.2* 3.7 3.7 2.1*   AST 16 16 14 10 12 7   ALT 10 8 6* 6* 13 9     Hematology Studies      Recent Labs   Lab Test 06/18/23  0728 06/17/23  0853 06/16/23  0605 06/14/23  1010 05/31/23  0630 05/30/23  1532   WBC 11.1* 13.8* 12.3* 14.1* 8.4 6.3   HGB 7.3* 8.3* 7.5* 7.2* 7.6* 7.9*   HCT 24.7* 26.9* 24.0* 23.1* 24.5* 23.9*    508* 438 444 297  --      Arterial Blood Gas Testing    Recent Labs   Lab Test 06/15/23  1325  11/26/22  0945 11/22/22 1925 11/22/22  1644   PH 7.39  --   --   --    PCO2 44  --   --   --    PO2 83  --   --   --    HCO3 26  --   --   --    O2PER 2 32 40.0 47.0          Latest Ref Rng & Units 6/18/2023     7:28 AM 6/17/2023     8:53 AM 6/16/2023     6:05 AM 6/14/2023    10:10 AM 6/14/2023     6:51 AM   Transplant Immunosuppression Labs   Creat 0.67 - 1.17 mg/dL 1.05   1.19   1.14    1.15     Urea Nitrogen 8.0 - 23.0 mg/dL 28.9    27.7    24.5     WBC 4.0 - 11.0 10e3/uL 11.1   13.8   12.3   14.1      Neutrophil %  74   74   72             Imaging:   XR Chest Port 1 View  Result Date: 6/14/2023  IMPRESSION: Negative portable view. If further detail is needed comment consider upright PA and lateral examination when clinical condition permits. TAE MORRIS MD   SYSTEM ID:  [id number]    XR Abdomen 1 View  Result Date: 6/10/2023  IMPRESSION: Nonobstructive bowel gas pattern. Large stool burden. I have personally reviewed the examination and initial interpretation and I agree with the findings. JONNATHAN COLEMAN MD   SYSTEM ID:  [id number]

## 2023-06-19 NOTE — PLAN OF CARE
Goal Outcome Evaluation:Patient quiet,will answer simple questions.Voiced upset to not knowing location, able to direct, and acknowledges situation.Request to be left alone.Left LE warm/dry.C/O pain knee to foot. Unable to move LE. Refused  minimal movement and states anger remarks when encouraged.Ultram given with relief,continued to refuse activity.Tolerated fluid when offered.Declined food intake.Attained KEILA,xray/CT to left hip area.Informed of transfer to ortho location today.Continue to monitor.

## 2023-06-19 NOTE — PROGRESS NOTES
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  Postop course complicated by profound deconditioning.  He was admitted to TCU initially on 12/23/2022 for physical rehabilitation, however therapies no longer worked with patient after several months of an in bed therapy and his refusals to mobilize.  Underwent biopsy on 4/7/2023 without evidence of infection.  He was transferred back to [facility] for left total hip arthroplasty and reimplantation on 5/26/23 with Dr. Meyers.  Presented again to [facility] for persistent MRSA bacteremia on 6/17.     Brief Hospital Summary:   Pt was admitted to continue infectious workup with MRSA bacteremia.  Admission blood cultures were positive.  ID and Ortho were consulted.  He was continued on vancomycin.  Hip cultures also grew Staph and klebsiella.  Per ID would hold off on ceftriaxone unless he worsens.  Leukocytosis and inflammatory markers are down trending.  KEILA was obtained on 6/19, which was negative for endocarditis or other findings.  Per ortho, they requested transfer to the [facility] to consider additional surgical management.      Plan for today:   - KEILA Monday Negative, will attempt to transfer to [facility] pending bed availability   - Surveillance cultures 6/19.    - Synovial fluid cultures with Staph and klebsiella.  Hold off on covering unless clinical course worsens.    - Appreciate ID and Ortho assistance    #MRSA bacteremia  #Leukocytosis Blood cultures: 6/14, 6/15 + for MRSA (4/4), and 6/16 (1/2  GPCs) and 6/17 (2/2) GPCs. - started vancomycin 6/15.   6/17 sinovial fluid cultures with staph and klebsiella.  Admision Chest XR normal. .16, WBC 14.1, PCL 0.30. BNP 2971- normal ECHO 5/31/23. At the TCU purulent drainage around drain site was noted incision c/d/i no redness. Denies increase in pain.   - Continue vancomycin, labs are normalizing.   - KEILA planned for Monday negative for vegetations or valvular abnormalities.    - Repeat cultures tomorrow and q48 until clear   - Trend inflammatory markers q48h (Downtrending)   - Appreciate ID assistance   - Due to positive hip cultures, Ortho requesting transfer to [facility] [place] [place] to pursue surgical management.      # S/p left hip explantation of left hip antibiotic spacer and revision of left total hip arthroplasty (5/26/2023)  - surgery by Dr. Rahman's team.  Developed left foot drop postoperatively.  AFOs ordered but patient has been declining to use these.  OR cultures no growth to date.  Pain has overall been well controlled.   - consulted ortho due to hip as potential source  - follow up left hip x-ray (recommended by ortho on 6/17)  -Activity: 50% PWB LLE with posterior hip precautions [id number]  -AFO ordered for foot drop but patient refusing to use  -PT/OT  -Knee to be kept even in a foam leg elevator to relax sciatic nerve  -Pain: Scheduled robaxin 4 times a day, APAP PRN                 -oxycodone  to 5-7.5 mg discontinued                 -start tramadol 25-50 mg every 6 hours PRN   -see above     # Hx of unprovoked DVT in 2018   - on eliquis 5 mg bid outpatient  - continue eliquis, may have to hold if procedures     #Confusion  #Altered mental status  Has had intermittent confusion since Monday morning. Stated he thought there were multiple people in his room overnight. Originally was easily reoriented. Improved after discontinuation of narcotics  - delirium precuations      #Type 2 diabetes mellitus  - Last hemoglobin A1c 5.6% on 6/16/2023.  On  "glipizide, metformin, Actos, and Victoza prior to hospitalizations and surgery.   - continue ISS    #Constipation  -Continue Colace 100 mg twice daily  -Continue senna 1 tablet twice daily  -Continue MiraLAX daily     #Hypomagnesemia  -monitor and replete      #Severe malnutrition in the context of acute on chronic illness  Goal for patient is to consume % of meals TID. Goal is 5885-3372 kcals/daily. Protein needs- 100-126 grams/daily. Corrected calcium 9.3.  Plan  -RD consult and appreciate recs  -regular diet  -Weekly weights  -I/Os      #Adjustment disorder with depressed mood  #Prolonged grief disorder  Patient has been with depressed mood in setting of ongoing health issues, as evidenced by lack of motivation to work with therapies during both TCU admissions.  Past history of passive suicidal statements. Psychiatry saw patient during hospital admission on 5/31 due to lack of motivation with therapies..  Patient declined interest in starting any psychiatric medications.  Willing to engage with health psychology.   -Outpatient health psychology consult  -Continue to monitor for willingness to start antidepressant therapy     Stable and Resolved Issues:  #Hyperlipidemia-continue PTA Lipitor 40 mg daily  #JAIR-CPAP when sleeping       Diet: Snacks/Supplements Adult: Other; Glucerna with bkf trays daily; With Meals  Room Service  Regular Diet Adult    DVT Prophylaxis: DOAC  Tran Catheter: Not present  Lines: None     Cardiac Monitoring: None  Code Status: Full Code      Clinically Significant Risk Factors              # Hypoalbuminemia: Lowest albumin = 2.6 g/dL at 6/19/2023  9:15 AM, will monitor as appropriate            # Obesity: Estimated body mass index is 36.45 kg/m  as calculated from the following:    Height as of 5/26/23: 1.778 m (5' 10\").    Weight as of 6/16/23: 115.2 kg (254 lb)., PRESENT ON ADMISSION  # Moderate Malnutrition: based on nutrition assessment, PRESENT ON ADMISSION        Disposition " Plan      Expected Discharge Date: 2023                  Cali Gomes MD  Hospitalist Service, GOLD TEAM 11  [facility]  Securely message with [employer] (more info)  Text page via [employer] Paging/Directory   See signed in provider for up to date coverage information  ______________________________________________________________________    Interval History   Patient seen and examined.  No acute overnight events.  Overall he feels okay, but is feeling slightly confused and depressed about his situation.  No chest pain, palpitations, N/V, abdominal pain.  Hip pain is stable.      Physical Exam   Vital Signs: Temp: 97.7  F (36.5  C) Temp src: Oral BP: 139/60 Pulse: 82   Resp: 16 SpO2: 95 % O2 Device: Nasal cannula Oxygen Delivery: 3 LPM  Weight: 0 lbs 0 oz    General Appearance: NAD  Respiratory: CTA b/l  Cardiovascular: RRR S1/S2, no m,r,g  GI: soft, NT, ND, +BS  Skin: no rashes  Other: Trace edema     Medical Decision Making       60 MINUTES SPENT BY ME on the date of service doing chart review, history, exam, documentation & further activities per the note.      Data     I have personally reviewed the following data over the past 24 hrs:    10.1  \   7.1 (L)   / 402     139 105 26.6 (H) /  172 (H)   4.2 22 1.07 \       ALT: 12 AST: 19 AP: 94 TBILI: 0.2   ALB: 2.6 (L) TOT PROTEIN: 6.2 (L) LIPASE: N/A       Procal: N/A CRP: 118.00 (H) Lactic Acid: N/A         Imaging results reviewed over the past 24 hrs:   Recent Results (from the past 24 hour(s))   Transesophageal Echocardiogram   Result Value    LVEF  55-60%    Narrative    [id number]  [id number]  [id number]  [id number]^PENNIG^YOEL^E                                                                       Version 2     [facility]  Echocardiography Laboratory  [address] [place] [id number]     Name: SANDRA BRANCH  MRN: [id number]  : 1950  Study Date: 2023  10:05 AM  Age: 72 yrs  Gender: Male  Patient Location: [facility]  Reason For Study: Endocarditis  Ordering Physician: YOEL KARIMI  Referring Physician: YOEL KARIMI  Performed By: PHOEBE He MD     BSA: 2.3 m2  Height: 70 in  Weight: 243 lb  HR: 75  BP: 119/64 mmHg  ______________________________________________________________________________  Interpretation Summary  No vegetation or mass identified. No significant valvular abnormalities were  noted.  ______________________________________________________________________________  Procedure  Transesophageal Echocardiogram with color and spectral Doppler performed. The  procedure was performed in the Echo Lab. Informed consent for Transesophegeal  echo obtained. KEILA Probe #63 was used during the procedure. Patient was  sedated using Fentanyl 50 mcg. Patient was sedated using Versed 1 mg. The  heart rate, respiratory rate, oxygen saturations, blood pressure, and response  to care were monitored throughout the procedure with the assistance of the  nurse. I determined this patient to be an appropriate candidate for the  planned sedation and procedure and have reassessed the patient immediately  prior to sedation and procedure. Total sedation time: 15 minutes of continuous  bedside 1:1 monitoring. The Transducer was inserted without difficulty . The  patient tolerated the procedure well. Complications None.     Left Ventricle  Global and regional left ventricular function is normal with an EF of 55-60%.     Right Ventricle  Right ventricular function, chamber size, wall motion, and thickness are  normal.     Atria  Both atria appear normal. The left atrial appendage is normal. It is free of  spontaneous echo contrast and thrombus. The left atrial appendage Doppler  velocities are normal. The atrial septum is intact as assessed by color  Doppler .     Mitral Valve  The mitral valve is normal. Mild mitral insufficiency is present.     Aortic Valve  Aortic valve is normal  in structure and function.     Tricuspid Valve  The tricuspid valve is normal.     Pulmonic Valve  The pulmonic valve is normal.     Vessels  The aorta root is normal. The pulmonary artery and bifurcation cannot be  assessed.     Pericardium  No pericardial effusion is present.     Miscellaneous  Transgastric views not obtained due to difficulty with sedation and  desaturation.     ______________________________________________________________________________  Report approved by: Archana Deras 06/19/2023 12:34 PM     ______________________________________________________________________________      XR Pelvis w Hip Left 1 View    Narrative    Single views pelvis radiograph(s) 6/19/2023 11:07 AM    History: Hx of multiple prosthetic hip infections, currently with MRSA  bacteremia    Comparison: 6/10/2023    Findings:    AP view(s) of the pelvis and left hip were obtained.     Post surgical changes of left total hip arthroplasty revision with  longstem component in the femur. Surgical drain in projects over the  left hip. Small amount of subcutaneous emphysema projecting over the  joint space. No periprosthetic lucencies or evidence of hardware  complication. No new acute osseous abnormality. Healing proximal  femoral osteotomy.    Sacrum and innominate bones are partially obscured by overlying bowel  gas/fecal content.    Pelvic phlebolith.    Diffuse osteopenia.      Impression    Impression:  1. Stable post surgical changes of left total hip arthroplasty  revision. No evidence of hardware failure. Ongoing healing of proximal  left femoral osteotomy.  2. Subcutaneous emphysema projecting over the joint space may be  secondary to surgical drain versus infection.    I have personally reviewed the examination and initial interpretation  and I agree with the findings.    GERI LOYOLA MD (Joe)         SYSTEM ID:  [id number]   CT Hip Left w/o Contrast    Narrative    EXAM: CT HIP LEFT W/O CONTRAST  6/19/2023  11:32 AM      HISTORY: assess for abscess; Hip pain; Infection, known or r/o; Hip  x-ray result available; No known/automatically detected potential  contraindications to imaging    COMPARISON: Radiographs same-day, CT 7/6/2018     TECHNIQUE: CT imaging of the left hip without IV contrast. Multiplanar  reconstructions.    FINDINGS:      images: Revision total left hip arthroplasty. Left-sided  surgical drain.    Postsurgical changes of revision total left hip arthroplasty and  proximal femoral osteotomy the distal extent of which is excluded from  provided field-of-view. Possible femoral cerclage wire. No evidence of  hardware complication.    Expansile lytic lesion in the medial left ilium today measuring up to  3.6 x 5.6 cm in the axial plane, previously 3.4 x 5.2 cm on CT  7/6/2018. Areas of associated cortical thinning and possible  dehiscence, likely progressed compared to prior outside CT 10/25/2022  (series 3 image 23, for example).    Left-sided percutaneous drainage catheter terminates posterior lateral  to the posterior superior acetabulum. Scattered foci of subcutaneous  gas about the left hip, especially anteriorly. Soft tissue density  throughout this region. Assessment of discrete fluid collection  difficult on this noncontrast study compromised by metallic beam  hardening artifact. Partially visualized fluid collection  superficial-medial to the proximal adductor longus today measuring up  to 3.0 x 6.7 cm in the axial plane, previously 5.5 x 2.1 cm on  10/25/2022. The caudal extent is incompletely imaged. Soft tissue  stranding on the lateral aspect of the hip extending through the  overlying gluteal musculature.    Trace free fluid in the pelvis. Vascular calcifications.       Impression    IMPRESSION:     1. Soft tissue stranding and multiple foci of subcutaneous emphysema  about the left hip, especially anteriorly. Subcutaneous gas may be  related to percutaneous drain however infection not  excluded by  imaging.  *  Partially visualized fluid collection within subcutaneous fat  superficial-medial to the proximal adductor longus (caudal extent of  which is incompletely visualized) which appears enlarged compared to  outside CT 10/25/2022. Infection/abscess not excluded.    2. Revision total left hip arthroplasty without evidence of hardware  complication. Healing proximal femoral osteotomy.    3. Expansile, predominantly lytic lesion of the posterior medial left  ilium which is minimally enlarged compared to CT 7/6/2018.    GERI LOYOLA MD (Joe)         SYSTEM ID:  [id number]

## 2023-06-19 NOTE — PRE-PROCEDURE
GENERAL PRE-PROCEDURE:   Procedure:  Trans Esophageal Echocardiogram.  Date/Time:  6/19/2023 9:41 AM    Risks and benefits: Risks, benefits and alternatives were discussed    Patient states understanding of procedure being performed: Yes    Patient's understanding of procedure matches consent: Yes    Procedure consent matches procedure scheduled: Yes    Appropriately NPO:  Yes  ASA Class:  2  Lungs:  Lungs clear with good breath sounds bilaterally  Heart:  Normal heart sounds and rate  History & Physical reviewed:  History and physical reviewed and no updates needed  Statement of review:  I have reviewed the lab findings, diagnostic data, medications, and the plan for sedation

## 2023-06-19 NOTE — PROGRESS NOTES
Shift: [id number]     Neuro: A&O, confused and forgetful at times. Argumentative and resistive to cares.    Resp: WDL on RA . Denies SOB.   Cardiac: WDL. Denies cardiac chest pain.   GI/: Combination regular Moderate consistent carb (60 gram CHO per meal.) refusing bedtime blood sugar checks.   Skin: coccyx raw and red--cleansed, barrier cream applied. Assisted pt to change positions q2hrs, but pt refuses at times.   Activity: Lift, assist of 2, turned x2 during this shift. Encouraged more frequent turning (Q2hrs) but pt refuses, stating he is just fine.    Incision & Drainage: PIV SL. Left thigh GUERRERO drain and surgical incision.   Pain:  Left hip/leg pain--gvien PRN tramadol and Tylenol x1, has scheduled Robaxin-given x2  Labs: BG ACHS, sliding scale  insulin given  Plan: NPO after midnight, poss KEILA tomorrow. Continue with POC

## 2023-06-19 NOTE — PLAN OF CARE
Goal Outcome Evaluation:      Plan of Care Reviewed With: patient    Overall Patient Progress: no change    Outcome Evaluation: Assumed cared from 15:00-19:30, no acute changes. Patient remains alert and oriented; VSS, on 2L NC. Plans to send to [facility]. Refusing turns. Refusing cares. Refused pain medications - pain management plan reviewed with patient. EDWIN SL. Voiding via urinal. No BM. Refused skin assessment.    /71 (BP Location: Right arm)   Pulse 74   Temp 97.8  F (36.6  C) (Oral)   Resp 14   SpO2 97%

## 2023-06-19 NOTE — PROGRESS NOTES
Pt arrived in ECHO department for scheduled KEILA.   Procedure explained, questions answered and consent signed.    Pt's throat sprayed at 1000, therefore pt will not be able to eat or drink until 2 hours after at 1200. Informed pt of this time and encouraged to start with warm fluids and soft foods.    Pt tolerated procedure well, and was given a total of 50 mcg IV fentanyl and 1 mg IV versed for conscious sedation. Pt denied throat or chest pain after KEILA complete.   KEILA probe 63 used for procedure.  Pt denied chest or throat pain after procedure and was transferred to X-ray for further imaging studies after awake and VSS.    Report given to KARLI Barr RN.     Ann Joseph, RN

## 2023-06-19 NOTE — PROVIDER NOTIFICATION
"  Paged the team (gold 11).    Re: pt is refusing left hip CT scan. Also, pt is refusing to get his HS glucose checked, multiple staff members and charge nurse attempted with no luck. Pt stating \"get the doctor to come in and check my  blood sugar if its that important.\"  "

## 2023-06-19 NOTE — PLAN OF CARE
/58 (BP Location: Right arm, Patient Position: Semi-Fuentes's, Cuff Size: Adult Regular)   Pulse 82   Temp 98.1  F (36.7  C) (Oral)   Resp 18   SpO2 99%     Goal Outcome Evaluation:      Plan of Care Reviewed With: patient    Overall Patient Progress: no change     Neuro: A&Ox 3-4. Intermittently confused, forgetful.   Cardiac: Afebrile, VSS.   Respiratory: JAIR- refused supplemental O2 at start of shift. Desat to 75% with immediate recovery to > 95%. Alarming every 5-10 seconds. Pt agreed to use 2 lpm nc: sats maintained > 95% through remainder of shift.  GI/: Voiding spontaneously. No episodes of bowel or bladder incontinence. Refused bg check  Diet/appetite: NPO for procedure. Denies nausea   Activity: lift with assist of 2. Refused repositioning through night.  Pain: . Denies   Skin: No new deficits noted. Hip dressing intact. Refused skin inspection  Lines: piv sl'd. Flushed without issue  Drains: GUERRERO L surgical site. Dressing CDI. To bulb suction. 10 ml red output measured and recorded.  Replacement: awaiting am lab results. RN managed replacement protocol in place.    Plan: KEILA scheduled for 0830    Pt has been resting comfortably throughout night. Refused all cares and assessments. Continue to monitor and follow plan of care. Notify MD of changes/concerns      Problem: Plan of Care - These are the overarching goals to be used throughout the patient stay.    Goal: Optimal Comfort and Wellbeing  Outcome: Not Progressing     Problem: Plan of Care - These are the overarching goals to be used throughout the patient stay.    Goal: Readiness for Transition of Care  Outcome: Not Progressing

## 2023-06-19 NOTE — PROGRESS NOTES
Transfer Type: [facility]  Transfer Triage Note    Originating unit:[facility] ED    Final intended location for transfer: [facility]- med surg or IMC, or ICU    Tele required:  No    Time of admission request:     Anticipated to be boarding in ED for >4 hours? Yes    Was Hospitalist Team notified to complete H&P, admission orders, and assume care (sign into chart, collaborate with ED nurse, etc)? Yes    Patient added to Interhospital transfer list?  Yes    Brief case description: 72 yr old male comes with type 2 diabetes, hlp, larissa, doac, presented for left hip prosthesis infection, On iv vancomycin,     KEILA ruled out for endocarditis, Now been transferred from [place] to [place] for continued care under Hospitalist team with orthopedic consultation to address infected hip prosthesis.     Med/surg non tele isolation bed for h/o MRSA     Dagoberto Geller MD

## 2023-06-19 NOTE — PHARMACY-ADMISSION MEDICATION HISTORY
Pharmacy Intern Admission Medication History    Admission medication history is complete. The information provided in this note is only as accurate as the sources available at the time of the update.    Medication reconciliation/reorder completed by provider prior to medication history? Yes    Information Source(s): Facility (TCU/NH/) medication list/MAR via N/A    Pertinent Information: Pt is cared for at [facility], completed med hx with TCU MAR. Does not appear that pt is taking insulin at U    Changes made to PTA medication list:    Added:   o Docusate sodium 100 mg capsule. Take 1 capsule po BID  o Tramadol 50 mg tablet. Take 25-50 mg tablet Q6H prn pain  o Magnesium oxide 400 mg capsule. Take 1 capsule po every day    Deleted:   o Ibuprofen 600 mg tablet. Pt not taking ibuprofen at U    Changed: None    Allergies reviewed with patient and updates made in EHR: no    Medication History Completed By: Kristopher Abdullahi 6/19/2023 6:56 PM      Prior to Admission medications    Medication Sig Last Dose Taking? Auth Provider Long Term End Date   acetaminophen (TYLENOL) 325 MG tablet Take 2 tablets (650 mg) by mouth every 4 hours as needed for other (For optimal non-opioid multimodal pain management to improve pain control.) 6/19/2023 at 1139 Yes Jessica Hodge APRN CNS No    apixaban ANTICOAGULANT (ELIQUIS) 5 MG tablet Take 5 mg by mouth 2 times daily 6/19/2023 at 0804 Yes Unknown, Entered By History No    atorvastatin (LIPITOR) 40 MG tablet Take 40 mg by mouth daily 6/19/2023 at 0805 Yes Reported, Patient Yes    docusate sodium (COLACE) 100 MG capsule Take 100 mg by mouth 2 times daily Unknown Yes Unknown, Entered By History     lactobacillus rhamnosus, GG, (CULTURELL) capsule Take 1 capsule by mouth 2 times daily 6/19/2023 at 0804 Yes Maris Foster MD     magnesium oxide 400 MG CAPS Take 1 capsule by mouth daily 6/17/2023 at 0919 Yes Unknown, Entered By History     methocarbamol (ROBAXIN) 500 MG  tablet Take 1 tablet (500 mg) by mouth every 6 hours as needed for muscle spasms 6/19/2023 at 1137 Yes Jessica Hodge APRN CNS No    oxyCODONE (ROXICODONE) 5 MG tablet Take 1 tablet (5 mg) by mouth every 4 hours as needed for moderate pain 6/15/2023 at 0847 Yes Jessica Hodge APRN CNS No    polyethylene glycol (MIRALAX) 17 g packet Take 17 g by mouth daily 6/16/2023 at 0906 Yes Jessica Hodge APRN CNS     senna-docusate (SENOKOT-S/PERICOLACE) 8.6-50 MG tablet Take 1 tablet by mouth 2 times daily 6/19/2023 at 0804 Yes Jessica Hodge APRN CNS No    traMADol (ULTRAM) 50 MG tablet Take 25-50 mg by mouth every 6 hours as needed for severe pain 6/19/2023 at 1137 Yes Unknown, Entered By History No    insulin aspart (NOVOLOG PEN) 100 UNIT/ML pen Inject 1-7 Units Subcutaneous 3 times daily (before meals)   Jem Smith, DO Yes    insulin aspart (NOVOLOG PEN) 100 UNIT/ML pen Inject 1-5 Units Subcutaneous At Bedtime   Jem Smith, DO Yes

## 2023-06-20 NOTE — PROGRESS NOTES
General ID Service [facility] [place]: Follow Up Note      Patient:  Waldo Bustos, Date of birth 1950, Medical record number [id number]  Date of Visit:  June 19, 2023         Assessment and Recommendations:   Problem List:  1. MRSA bacteremia   -6/14 blood cultures: 4 of 4 bottles positive  -6/15 blood cultures: 4 of 4 bottles positive  -6/17 blood cultures: 2 of 4 bottles positive  -6/19 pending  -No lines/central access present  2. Chronic PJI  -Multiple surgeries, most recent being revision left THE on 5/26/23  -GUERRERO drain in place growing MRSA, Corynebacterium, and Klebsiella  3. DM2  4. RA  5. Venous insufficiency    Recommendations:  - Continue vancomycin (pharmacy to dose)   - Duration TBD pending ortho plans moving forward  - Follow repeat blood cultures  - If he decompensates, can add Ceftriaxone. But given was a drain culture, his white count has been trending down without covering the Klebiella and he seems overall clinically stable, and wound site looks good will hold off on gram neg coverage at this time to improve culture yield of anticipated hip surgery.       Discussion:  Pt who was followed by ID team at  (last by Johnny Mercado and Shira on [facility]), transferred to [place] for KEILA, now back to [facility] on 6/20/23.      71yo M history of chronic left hip PJI, most recently s/p revision left BELLA on 5/26, who has been in TCU since discharge on 6/2. Now with MRSA bacteremia with confusion and somnolence.      ID recommended echo, cardiology recommended KEILA because they had a hard time getting good windows on a prior TTE. Thus was transferred to [place] on 6/17.   Currently on Vanc. Blood cultures remained persistently positive for MRSA ( 6/14-6/17) but seems slowing down. Most likely source hip, ortho consulted, and they obtained cultures from drain on 6/17/2023. Per ortho, plan appears to be to follow-up culture of the left hip fluid, if positive to work on plan for DAIR with Dr. Meyers  later this week, and transfer back to  for this. Noted Hip fluid with new MRSA, Klebsiella, and Corynebacterium 6/18. Given this was a drain culture, his white count has been trending down without covering the GNB and he seems overall clinically stable will hold off on gram neg coverage at this time to improve culture yield of anticipated hip surgery.     CT hip without contrast today with Soft tissue stranding and multiple foci of subcutaneous emphysema about the left hip, especially anteriorly, partially visualized fluid collection within subcutaneous fat superficial-medial to the proximal adductor longus (caudal extent of which is incompletely visualized) which appears enlarged compared to outside CT 10/25/2022. Infection/abscess not excluded.     No cultures done 6/18, Blood cultures from today 6/19 pending. KEILA with No vegetation or mass identified. No significant valvular abnormalities were Noted.       Shelton Meredith MD  Infectious Diseases   [phone]        Interval History:   WBC wnl. CRP dropping. Afebrile. Reports he hasn't talked to surgery since returning - unclear plan as of yet. Back pain from all the movement yesterday, but doesn't feel there is any progressive worsening.         Current Antimicrobials     Vancomycin         Physical Exam:   Ranges for vital signs:  Temp:  [95.6  F (35.3  C)-98.2  F (36.8  C)] 95.6  F (35.3  C)  Pulse:  [63-82] 63  Resp:  [14-15] 15  BP: (126-153)/(61-71) 153/69  SpO2:  [97 %-100 %] 100 %    Intake/Output Summary (Last 24 hours) at 6/16/2023 1115  Last data filed at 6/15/2023 2239  Gross per 24 hour   Intake 50 ml   Output --   Net 50 ml     Exam:  GENERAL: Well-developed, well-nourished. Pleasant and cooperative.  ENT:  Head is normocephalic, atraumatic.   EYES:  Eyes have anicteric sclerae.    LUNGS:  Breathing comfortably  EXT: Extremities warm and without edema. Left hip with GUERRERO drain in place, serosanguinous fluid in line and collection vessel.  SKIN:  No  acute rashes. Suurgical site with no obvious erythema or purulence  NEUROLOGIC:  Grossly nonfocal, though confused and somnolent         Laboratory Data:   Reviewed.  Pertinent for:    Microbiology:  Culture   Date Value Ref Range Status   06/19/2023 No growth after 1 day  Preliminary   06/19/2023 No growth after 1 day  Preliminary   06/17/2023 4+ Staphylococcus aureus MRSA (A)  Final   06/17/2023 2+ Corynebacterium striatum (A)  Final     Comment:     Identification obtained by MALDI-TOF mass spectrometry research use only database. Test characteristics determined and verified by the Infectious Diseases Diagnostic Laboratory.   06/17/2023 1+ Klebsiella pneumoniae (A)  Final   06/17/2023 No anaerobic organisms isolated after 2 days  Preliminary   06/17/2023 Positive on the 1st day of incubation (A)  Final   06/17/2023 Staphylococcus aureus MRSA (AA)  Final     Comment:     1 of 2 bottlesSusceptibilities done on previous cultures   06/17/2023 Positive on the 1st day of incubation (A)  Final   06/17/2023 Staphylococcus aureus MRSA (AA)  Final     Comment:     1 of 2 bottlesSusceptibilities done on previous cultures   06/17/2023 <10,000 CFU/mL Urogenital marimar  Final   06/16/2023 Positive on the 1st day of incubation (A)  Final   06/16/2023 Staphylococcus aureus MRSA (AA)  Final     Comment:     1 of 2 bottlesSusceptibilities done on previous cultures   06/16/2023 Positive on the 1st day of incubation (A)  Final   06/16/2023 Staphylococcus aureus MRSA (AA)  Final     Comment:     1 of 2 bottlesSusceptibilities done on previous cultures   06/15/2023 Positive on the 1st day of incubation (A)  Final   06/15/2023 Staphylococcus aureus MRSA (AA)  Final     Comment:     2 of 2 bottlesSusceptibilities done on previous cultures   06/15/2023 Positive on the 1st day of incubation (A)  Final   06/15/2023 Staphylococcus aureus MRSA (AA)  Final     Comment:     2 of 2 bottlesSusceptibilities done on previous cultures   06/14/2023  Positive on the 1st day of incubation (A)  Final   06/14/2023 Staphylococcus aureus MRSA (AA)  Final     Comment:     2 of 2 bottlesSusceptibilities done on previous cultures   06/14/2023 Positive on the 1st day of incubation (A)  Final   06/14/2023 Staphylococcus aureus MRSA (AA)  Final     Comment:     2 of 2 bottles     05/26/2023 No anaerobic organisms isolated  Final   05/26/2023 No anaerobic organisms isolated  Final   05/26/2023 No anaerobic organisms isolated  Final   05/26/2023 No anaerobic organisms isolated  Final   05/26/2023 No Growth  Final   05/26/2023 No Growth  Final   05/26/2023 No Growth  Final   05/26/2023 No Growth  Final   05/26/2023 No anaerobic organisms isolated  Final   05/26/2023 No Growth  Final   04/07/2023 No anaerobic organisms isolated  Final   04/07/2023 No Growth  Final   04/07/2023 No anaerobic organisms isolated  Final   04/07/2023 No Growth  Final   04/07/2023 No anaerobic organisms isolated  Final   04/07/2023 No Growth  Final   04/07/2023 No anaerobic organisms isolated  Final   04/07/2023 No Growth  Final   04/07/2023 No anaerobic organisms isolated  Final   04/07/2023 No Growth  Final   04/07/2023 No anaerobic organisms isolated  Final   04/07/2023 No Growth  Final   11/22/2022 4+ Finegoldia magna (A)  Final     Comment:     Susceptibilities done on previous cultures   11/22/2022 No Growth  Final   11/22/2022 1+ Proteus mirabilis (A)  Final     Comment:     Susceptibilities done on previous cultures   11/22/2022 1+ Finegoldia magna (A)  Final     Comment:     Susceptibilities done on previous cultures   11/22/2022 No Growth  Final   11/22/2022 1+ Proteus mirabilis (A)  Final   11/22/2022 No anaerobic organisms isolated  Final   11/22/2022 No anaerobic organisms isolated  Final   11/22/2022 No anaerobic organisms isolated  Final   11/22/2022 No Growth  Final   11/22/2022 No Growth  Final   11/22/2022 No Growth  Final   11/22/2022 Isolated in broth only Proteus mirabilis (A)   Final     Comment:     On day 1 of incubationSusceptibilities done on previous cultures   11/22/2022 Isolated in broth only Proteus mirabilis (A)  Final     Comment:     On day 1 of incubationSusceptibilities done on previous cultures   11/22/2022 1+ Proteus mirabilis (A)  Final   11/22/2022 No anaerobic organisms isolated  Final   11/22/2022 No Growth  Final   11/22/2022 1+ Proteus mirabilis (A)  Final     Comment:     Susceptibilities done on previous cultures   11/14/2022 4+ Porphyromonas species (A)  Final     Comment:     Beta Lactamase Positive  Identification obtained by MALDI-TOF mass spectrometry research use only database. Test characteristics determined and verified by the Infectious Diseases Diagnostic Laboratory.   11/14/2022 1+ Finegoldia magna (A)  Corrected   11/14/2022 3+ Proteus mirabilis (A)  Final   11/14/2022 No Growth  Final     Metabolic Studies       Recent Labs   Lab Test 06/20/23  1210 06/20/23  0758 06/20/23  0704 06/20/23  0154 06/19/23  2157 06/19/23  1730 06/19/23  1145 06/19/23  0915 06/19/23  0622 06/18/23  1106 06/18/23  0728 06/17/23  1122 06/17/23  0853 06/16/23  0820 06/16/23  0605 06/14/23  0742 06/14/23  0651 06/10/23  1809 06/10/23  1444 03/21/23  0810 03/20/23  1919 01/10/23  1710 01/10/23  1105   NA  --   --  137  --   --   --   --  139  --   --  138  --   --   --  141  --  140  --  139   < >  --    < >  --    POTASSIUM  --   --  4.0  --   --   --   --  4.2 4.0  --  4.1  --   --   --  4.2  --  4.4  --  4.1   < >  --    < >  --    CHLORIDE  --   --  104  --   --   --   --  105  --   --  104  --   --   --  104  --  102  --  103   < >  --    < >  --    CO2  --   --  25  --   --   --   --  22  --   --  24  --   --   --  24  --  23  --  25   < >  --    < >  --    ANIONGAP  --   --  8  --   --   --   --  12  --   --  10  --   --   --  13  --  15  --  11   < >  --    < >  --    BUN  --   --  28.6*  --   --   --   --  26.6*  --   --  28.9*  --   --   --  27.7*  --  24.5*  --  17.2    < >  --    < >  --    CR  --   --  1.21*  --   --   --   --  1.07  --   --  1.05  --  1.19*  --  1.14  --  1.15  --  1.10   < >  --    < >  --    GFRESTIMATED  --   --  64  --   --   --   --  74  --   --  76  --  65  --  69  --  68  --  72   < >  --    < >  --    * 134* 141* 150* 183* 142*   < > 179*  --    < > 189*   < >  --    < > 171*   < > 146*   < > 119*   < >  --    < >  --    A1C  --   --   --   --   --   --   --   --   --   --   --   --   --   --  5.6  --   --   --   --   --   --    < >  --    MAIKOL  --   --  8.0*  --   --   --   --  8.1*  --   --  8.4*  --   --   --  8.3*  --  8.2*  --  8.3*   < >  --    < >  --    MAG  --   --  1.7  --   --   --   --   --   --   --  1.8  --  1.9  --  1.7  --  1.6*   < > 1.3*  --   --   --   --    LACT  --   --   --   --   --   --   --   --   --   --   --   --   --   --   --   --   --   --   --   --  1.3  --  0.9    < > = values in this interval not displayed.     Hepatic Studies    Recent Labs   Lab Test 06/20/23  0704 06/19/23  0915 06/16/23  0605 06/14/23  0651 05/31/23  0630 05/26/23  0753   BILITOTAL 0.2 0.2 0.2 0.2 0.2 0.3   ALKPHOS 93 94 114 110 74 92   ALBUMIN 2.3* 2.6* 2.8* 2.8* 3.2* 3.7   AST 14 19 16 16 14 10   ALT 14 12 10 8 6* 6*     Hematology Studies      Recent Labs   Lab Test 06/20/23  0704 06/19/23  0915 06/18/23  0728 06/17/23  0853 06/16/23  0605 06/14/23  1010   WBC 8.9 10.1 11.1* 13.8* 12.3* 14.1*   HGB 7.3* 7.1* 7.3* 8.3* 7.5* 7.2*   HCT 25.0* 24.0* 24.7* 26.9* 24.0* 23.1*    402 417 508* 438 444     Arterial Blood Gas Testing    Recent Labs   Lab Test 06/15/23  1325 11/26/22  0945 11/22/22  1925 11/22/22  1644   PH 7.39  --   --   --    PCO2 44  --   --   --    PO2 83  --   --   --    HCO3 26  --   --   --    O2PER 2 32 40.0 47.0          Latest Ref Rng & Units 6/20/2023     7:04 AM 6/19/2023     9:15 AM 6/18/2023     7:28 AM 6/17/2023     8:53 AM 6/16/2023     6:05 AM   Transplant Immunosuppression Labs   Creat 0.67 - 1.17 mg/dL 1.21    1.07   1.05   1.19   1.14     Urea Nitrogen 8.0 - 23.0 mg/dL 28.6   26.6   28.9    27.7     WBC 4.0 - 11.0 10e3/uL 8.9   10.1   11.1   13.8   12.3     Neutrophil %  65    74   74            Imaging:   XR Chest Port 1 View  Result Date: 6/14/2023  IMPRESSION: Negative portable view. If further detail is needed comment consider upright PA and lateral examination when clinical condition permits. TAE MORRIS MD   SYSTEM ID:  [id number]    XR Abdomen 1 View  Result Date: 6/10/2023  IMPRESSION: Nonobstructive bowel gas pattern. Large stool burden. I have personally reviewed the examination and initial interpretation and I agree with the findings. JONNATHAN COLEMAN MD   SYSTEM ID:  [id number]

## 2023-06-20 NOTE — PROGRESS NOTES
Report given to [facility] ORTHO nurse.   Transport place, pt left with belongings.

## 2023-06-20 NOTE — TELEPHONE ENCOUNTER
Call was placed to unit 5 Ortho and I left a message with the nurse letting them know that his appointment time had been changed on Thursday from 7 AM to 1020.  She let me know that she would communicate this with the  and ensure transportation is arranged.  Callback number to clinic was provided.

## 2023-06-20 NOTE — PROGRESS NOTES
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  Postop course complicated by profound deconditioning.  He was admitted to TCU initially on 12/23/2022 for physical rehabilitation, however therapies no longer worked with patient after several months of an in bed therapy and his refusals to mobilize.  Underwent biopsy on 4/7/2023 without evidence of infection.  He was transferred back to [facility] for left total hip arthroplasty and reimplantation on 5/26/23 with Dr. Meyers.  Presented again to [facility] for persistent MRSA bacteremia on 6/17.     Brief Hospital Summary:   Pt was admitted to continue infectious workup with MRSA bacteremia.  Admission blood cultures were positive.  ID and Ortho were consulted.  He was continued on vancomycin.  Hip cultures also grew Staph and klebsiella, though ortho suspected this may have been contaminated.  Per ID would hold off on ceftriaxone unless he worsens.  Leukocytosis and inflammatory markers are down trending.  KEILA was obtained on 6/19, which was negative for endocarditis or other findings. Per ortho, they requested transfer to the [facility] to consider additional surgical management.      Plan for today:   - follow up with ortho and ID regarding plan moving forward, blood cultures collected this morning, CRP tomorrow morning    - Synovial fluid cultures with Staph and klebsiella.  Hold off on covering unless clinical course worsens.     MRSA bacteremia due to possible left hip septic arthritis vs other source  Blood cultures: 6/14, 6/15 + for MRSA (4/4), and 6/16 (1/2 GPCs) and 6/17 (2/2) GPCs. - started vancomycin 6/15.   6/17 sinovial fluid cultures with staph and klebsiella. Purulent drainage noted at incision site at TCU, CT left hip concerning for infection (no mention of joint, mostly anterior in soft tissue) and couldn't exclude abscess/inf  - Continue vancomycin, labs are normalizing.   - Repeat cultures q48 until clear (next 6/21), trend CRP q48h  - follow up with ortho and ID     S/p left hip explantation of left hip antibiotic spacer and revision of left total hip arthroplasty (5/26/2023)  - ortho plan pending  -Activity: 50% PWB LLE with posterior hip precautions [id number]  -AFO ordered for foot drop but patient refusing to use  -PT/OT  -Knee to be kept even in a foam leg elevator to relax sciatic nerve  -Pain: Scheduled robaxin 4 times a day, APAP PRN                 -oxycodone  to 5-7.5 mg discontinued                 -tramadol 25-50 mg every 6 hours PRN     Hx of unprovoked DVT in 2018   - on eliquis 5 mg bid     Altered mental status due to Delirium from illness/hospitalization  Has had intermittent confusion since Monday morning. Stated he thought there were multiple people in his room overnight. Originally was easily reoriented. Improved after discontinuation of narcotics  - delirium precuations      Type 2 diabetes mellitus  - Last hemoglobin A1c 5.6% on 6/16/2023.  On glipizide, metformin, Actos, and Victoza prior to hospitalizations and surgery.   - continue ISS    Constipation  -Continue Colace 100 mg twice daily  -Continue senna 1 tablet twice daily  -Continue MiraLAX daily     Hypomagnesemia  -monitor and replete      Severe malnutrition in the context of acute on chronic illness  Goal for patient is to consume % of meals TID. Goal is 1341-6772 kcals/daily. Protein needs- 100-126 grams/daily.   -RD consult and appreciate recs  -regular diet  -Weekly weights, daily I/O     Adjustment disorder  "with depressed mood  Prolonged grief disorder  Patient has been with depressed mood in setting of ongoing health issues, as evidenced by lack of motivation to work with therapies during both TCU admissions.  Past history of passive suicidal statements. Psychiatry saw patient during hospital admission on 5/31 due to lack of motivation with therapies..  Patient declined interest in starting any psychiatric medications.  Willing to engage with health psychology.   -Outpatient health psychology consult  -Continue to monitor for willingness to start antidepressant therapy     Stable and Resolved Issues:  Hyperlipidemia-continue PTA Lipitor 40 mg daily  JAIR-CPAP when sleeping       Diet: Snacks/Supplements Adult: Other; Glucerna with bkf trays daily; With Meals  Room Service  Regular Diet Adult    DVT Prophylaxis: DOAC  Tran Catheter: Not present  Lines: None     Cardiac Monitoring: None  Code Status: Full Code      Clinically Significant Risk Factors              # Hypoalbuminemia: Lowest albumin = 2.3 g/dL at 6/20/2023  7:04 AM, will monitor as appropriate            # Obesity: Estimated body mass index is 36.45 kg/m  as calculated from the following:    Height as of 5/26/23: 1.778 m (5' 10\").    Weight as of 6/16/23: 115.2 kg (254 lb)., PRESENT ON ADMISSION  # Moderate Malnutrition: based on nutrition assessment, PRESENT ON ADMISSION        Disposition Plan     Expected Discharge Date: 06/23/2023                  Jem Malloy DO  Hospitalist Service, GOLD TEAM 21  [facility]  Securely message with [employer] (more info)  Text page via [facility] Paging/Directory   See signed in provider for up to date coverage information  ______________________________________________________________________    Interval History   Patient seen and examined.  No acute overnight events.  Overall he feels okay, but is feeling slightly confused and depressed about his situation.  No chest pain, " palpitations, N/V, abdominal pain.  Hip pain is stable.      Physical Exam   Vital Signs: Temp: (!) 95.6  F (35.3  C) Temp src: Axillary BP: (!) 153/69 Pulse: 63   Resp: 15 SpO2: 100 % O2 Device: Nasal cannula Oxygen Delivery: 3 LPM  Weight: 0 lbs 0 oz    General Appearance: NAD  Respiratory: CTA b/l  Cardiovascular: RRR S1/S2, no m,r,g  Skin: no rashes  Other: Trace edema     Medical Decision Making       60 MINUTES SPENT BY ME on the date of service doing chart review, history, exam, documentation & further activities per the note.      Data     I have personally reviewed the following data over the past 24 hrs:    8.9  \   7.3 (L)   / 375     137 104 28.6 (H) /  134 (H)   4.0 25 1.21 (H) \       ALT: 14 AST: 14 AP: 93 TBILI: 0.2   ALB: 2.3 (L) TOT PROTEIN: 5.9 (L) LIPASE: N/A       Imaging results reviewed over the past 24 hrs:   No results found for this or any previous visit (from the past 24 hour(s)).

## 2023-06-20 NOTE — PLAN OF CARE
VS: /67   Pulse 75   Temp (!) 95.3  F (35.2  C) (Axillary)   Resp 16   SpO2 97%    O2: >90% on 2L of oxygen via NC. Denies SOB/N/V/Chest pain   Output: Patient incontinent at times, voiding using urinal    Last BM: Patient report LBM 6/16/23. Schedule bowel meds given    Activity: Patient not out of bd this shift. Refusing to be reposition. Writer educated patient of risk of immobility, patient grew anger asking that he be left alone.    Skin: L hip incision, suspected pressure injure to buttock. Patient refusing WOCN assessment. Bruises to BUE.    Pain: Managed with PRN Tylenol, Tramadol, and schedule Robaxin    CMS: Alert and oriented x3.    Dressing: NA   Diet: Regular diet    LDA: PIV   GUERRERO Drain    Equipment: IV pole/pump, walker, gait belt, PCDs, personal belongings, call light within patient reach    Plan: Plan for surgery tomorrow with Dr. Meyers    Additional Info:

## 2023-06-20 NOTE — CONSULTS
WOC Consult    S: WOC Consult to evaluate and treat buttock wound.     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  Postop course complicated by profound deconditioning.  He was admitted to TCU initially on 12/23/2022 for physical rehabilitation, however therapies no longer worked with patient after several months of an in bed therapy and his refusals to mobilize.  Underwent biopsy on 4/7/2023 without evidence of infection.  He was transferred back to [facility] for left total hip arthroplasty and reimplantation on 5/26/23 with Dr. Meyers.  Presented again to [facility] for persistent MRSA bacteremia on 6/17.     A: Patient refusing to turn for WOC assessment. WOC spoke with bedside RN, Laya. Laya states patient has excoriated skin on bilateral buttocks with a few open area. She does not believe he has a pressure injury. Patient has been seen by WOC during previous admissions and has had Moisture Associated Skin Damage (MASD), denuded skin from chronic moisture.     P: WOC will check in with staff and PT to attempt to assess the patient tomorrow during routine skin cares. Clustering cares may encourage compliance with turning and repositioning. For now, follow the below recs for skin care.      Cleanse the area with Marily cleanse and protect, very gently with soft cloth.    Apply ostomy powder (#2565) on all open and denuded skin.    Apply thin layer of critic aid paste on top of it.    With repeat application, do not scrub the paste, only remove soiled paste and reapply.    If complete removal of paste is necessary use baby oil/mineral oil (#709648) and soft wash  cloth.    Ensure pt has Isaac-cushion (#005656) while sitting up in the chair.    Use only one Covidien pad in between mattress and pt. No brief while in bed.    Pump added to bed for air circulation today.     [facility] will follow up tomorrow.    Kristen Hadley RN, CWOCN  Available via [employer] meka: [facility]  Office [phone]

## 2023-06-20 NOTE — PLAN OF CARE
Pt arrived from [address] on [facility] at 2145, report taken from [place]. Hospitalist notified of patient's arrival. A&Ox4. VSS. Afebrile. Lung sounds CTA. Maintaining sats on 3L 02 via NC, on continuous pulse-ox. Bowel sounds active, pt unsure of date of LBM, per chart LBM 6/16. CMS and neuro's are intact. Pt reports numbness and tingling in L foot, otherwise denies. Denied assessment of L foot pulse, states he doesn't want his leg or foot touched. Pt declined a full assessment, declines to roll to remove hovermat, continue to offer. Denies nausea, shortness of breath, and chest pain. L hip pain managed w/ scheduled Robaxin, prn Tramadol and Tylenol. Voids spontaneously without difficulty via urinal. Tolerating regular diet,  at HS and 150 at 0200. L hip incisional dressing is CDI. GUERRERO in place. Pt transferred to bed via hovermat, pt has refused to turn to remove, education provided, continue to offer reposition and hovermat removal. PIV is patent and SL. Call light is within reach, pt able to make needs known and is resting comfortably between cares. Will continue to monitor.

## 2023-06-20 NOTE — PROGRESS NOTES
BP (!) 153/69   Pulse 63   Temp (!) 95.6  F (35.3  C) (Axillary)   Resp 15   SpO2 100%     Pt arrived from [address] [facility] on [facility] at 2145 6/19. A&Ox4. VSS. Afebrile. Lung sounds CTA. Maintaining sats on 2L 02 via NC, on continuous pulse-ox. Bowel sounds active, pt unsure of date of LBM, per chart LBM 6/16. CMS and neuro's are intact. Pt reports numbness and tingling in L foot, otherwise denies. Wants you to move him very slowly when moving. States he doesn't want his leg or foot touched. Pt declined a full assessment. When he was rolled his buttocks has a tear/PI. WOC consulted.  Denies nausea, shortness of breath, and chest pain. L hip pain managed w/ scheduled Robaxin, prn Tramadol and Tylenol. Voids spontaneously without difficulty via urinal. Tolerating regular diet, he wants to order his own. L hip incisional dressing is CDI. GUERRERO in place.  PIV is patent and SL. Call light is within reach, pt able to make needs known and is resting comfortably between cares. He is frustrated with his long journey and has many questions for the orthopedist.

## 2023-06-21 NOTE — PROGRESS NOTES
[facility] contacted based on critical lab vanco 28.9. She was going to look at it and adjust next dose.

## 2023-06-21 NOTE — PROGRESS NOTES
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  Postop course complicated by profound deconditioning.  He was admitted to TCU initially on 12/23/2022 for physical rehabilitation, however therapies no longer worked with patient after several months of an in bed therapy and his refusals to mobilize.  Underwent biopsy on 4/7/2023 without evidence of infection.  He was transferred back to [facility] for left total hip arthroplasty and reimplantation on 5/26/23 with Dr. Meyers.  Presented again to [facility] for persistent MRSA bacteremia on 6/17.     Brief Hospital Summary:   Pt was admitted to continue infectious workup with MRSA bacteremia.  Admission blood cultures were positive.  ID and Ortho were consulted.  He was continued on vancomycin.  Hip cultures also grew Staph and klebsiella, though ortho suspected this may have been contaminated.  Per ID would hold off on ceftriaxone unless he worsens.  Leukocytosis and inflammatory markers are down trending.  KEILA was obtained on 6/19, which was negative for endocarditis or other findings. Ortho to take for washout today 6/21.      Plan for today:   - will touch base with ID regarding antibiotic plan following surgery    MRSA bacteremia due to possible left hip septic arthritis vs other source Blood cultures: 6/14, 6/15 + for MRSA (4/4), and 6/16 (1/2 GPCs) and 6/17 (2/2) GPCs. - started vancomycin 6/15.   6/17 sinovial fluid cultures with staph and klebsiella. Purulent drainage noted at incision site at TCU,  CT left hip concerning for infection (no mention of joint, mostly anterior in soft tissue) and couldn't exclude abscess/inf  - Continue vancomycin, labs are normalizing.   - blood cultures negative 6/19 and 6/20 (last + was 6/17), trend CRP q48h (downtrending)    S/p left hip explantation of left hip antibiotic spacer and revision of left total hip arthroplasty (5/26/2023)  - ortho plan pending  -Activity: 50% PWB LLE with posterior hip precautions [id number]  -AFO ordered for foot drop but patient refusing to use  -PT/OT  -Knee to be kept even in a foam leg elevator to relax sciatic nerve  -Pain: Scheduled robaxin 4 times a day, APAP PRN                 -oxycodone  to 5-7.5 mg discontinued                 -tramadol 25-50 mg every 6 hours PRN     Hx of unprovoked DVT in 2018   - on eliquis 5 mg bid     Altered mental status due to Delirium from illness/hospitalization  Has had intermittent confusion since Monday morning. Stated he thought there were multiple people in his room overnight. Originally was easily reoriented. Improved after discontinuation of narcotics  - delirium precuations      Type 2 diabetes mellitus  - Last hemoglobin A1c 5.6% on 6/16/2023.  On glipizide, metformin, Actos, and Victoza prior to hospitalizations and surgery.   - continue ISS    Constipation  -Continue Colace 100 mg twice daily  -Continue senna 1 tablet twice daily  -Continue MiraLAX daily     Hypomagnesemia  -monitor and replete      Severe malnutrition in the context of acute on chronic illness  Goal for patient is to consume % of meals TID. Goal is 0697-2242 kcals/daily. Protein needs- 100-126 grams/daily.   -RD consult and appreciate recs  -regular diet  -Weekly weights, daily I/O     Adjustment disorder with depressed mood  Prolonged grief disorder  Patient has been with depressed mood in setting of ongoing health issues, as evidenced by lack of motivation to work with therapies during both TCU admissions.  Past history of  "passive suicidal statements. Psychiatry saw patient during hospital admission on 5/31 due to lack of motivation with therapies..  Patient declined interest in starting any psychiatric medications.  Willing to engage with health psychology.   -Outpatient health psychology consult  -Continue to monitor for willingness to start antidepressant therapy     Stable and Resolved Issues:  Hyperlipidemia-continue PTA Lipitor 40 mg daily  JAIR-CPAP when sleeping       Diet: Snacks/Supplements Adult: Other; Glucerna with bkf trays daily; With Meals  Room Service  NPO per Anesthesia Guidelines for Procedure/Surgery Except for: Meds    DVT Prophylaxis: DOAC  Tran Catheter: Not present  Lines: None     Cardiac Monitoring: None  Code Status: Full Code      Clinically Significant Risk Factors              # Hypoalbuminemia: Lowest albumin = 2.3 g/dL at 6/20/2023  7:04 AM, will monitor as appropriate            # Obesity: Estimated body mass index is 36.45 kg/m  as calculated from the following:    Height as of 5/26/23: 1.778 m (5' 10\").    Weight as of 6/16/23: 115.2 kg (254 lb)., PRESENT ON ADMISSION  # Moderate Malnutrition: based on nutrition assessment, PRESENT ON ADMISSION        Disposition Plan     Expected Discharge Date: 06/23/2023                  Jem Malloy, DO  Hospitalist Service, GOLD TEAM 21  [facility]  Securely message with [employer] (more info)  Text page via [employer] Paging/Directory   See signed in provider for up to date coverage information  ______________________________________________________________________    Interval History   Patient seems down - also resigned to provider plans. Says they told him early afternoon for surgery. He denies new complaints, no questions this morning.     Physical Exam   Vital Signs: Temp: (!) 96.3  F (35.7  C) Temp src: Axillary BP: (!) 142/67 Pulse: 77   Resp: 16 SpO2: 96 % O2 Device: Nasal cannula Oxygen Delivery: 2 LPM  Weight: " 0 lbs 0 oz    Lying down in no distress  Awake, alert  Speech clear, coherent  RR and wob normal    Medical Decision Making       35 MINUTES SPENT BY ME on the date of service doing chart review, history, exam, documentation & further activities per the note.      Data     I have personally reviewed the following data over the past 24 hrs:    10.1  \   7.3 (L)   / 354     N/A N/A N/A /  139 (H)   N/A N/A N/A \       Procal: N/A CRP: 57.60 (H) Lactic Acid: N/A         Imaging results reviewed over the past 24 hrs:   No results found for this or any previous visit (from the past 24 hour(s)).

## 2023-06-21 NOTE — ANESTHESIA PROCEDURE NOTES
Airway       Patient location during procedure: OR       Procedure Start/Stop Times: 6/21/2023 2:28 PM  Staff -        CRNA: Tiffanie Min APRN CRNA       Performed By: CRNA  Consent for Airway        Urgency: elective  Indications and Patient Condition       Indications for airway management: caridad-procedural       Induction type:intravenous       Mask difficulty assessment: 2 - vent by mask + OA or adjuvant +/- NMBA    Final Airway Details       Final airway type: endotracheal airway       Successful airway: Oral and ETT - single  Endotracheal Airway Details        ETT size (mm): 8.0       Cuffed: yes       Cuff volume (mL): 5       Successful intubation technique: video laryngoscopy       VL Blade Size: MAC D Blade       Grade View of Cords: 1       Adjucts: stylet       Position: Right       Measured from: lips       Secured at (cm): 24       Bite block used: None    Post intubation assessment        Placement verified by: capnometry, equal breath sounds and chest rise        Number of attempts at approach: 1       Number of other approaches attempted: 0       Secured with: commercial tube montilla       Ease of procedure: easy       Dentition: Intact and Unchanged    Medication(s) Administered   Medication Administration Time: 6/21/2023 2:28 PM    Additional Comments       Patient has no neck movement.  Sits to the right and slight extension.  Unable to do a chin lift.  Adequate masking with OAW.  Grade 1 view of glottic opening, although seems like entire larynx is anterior.  Needed CP and hockey stick stylet.

## 2023-06-21 NOTE — PROGRESS NOTES
BRIEF ID NOTE    Planned for surgery today. Please obtain aerobic and anaerobic cultures during procedure.     After surgery, recommend restarting vancomycin and adding ceftriaxone 2g Q24H.       ID will continue to follow.  Shelton Meredith MD on 6/21/2023 at 4:24 PM

## 2023-06-21 NOTE — PHARMACY-VANCOMYCIN DOSING SERVICE
Pharmacy Vancomycin Note  Date of Service 2023  Patient's  1950   72 year old, male    Indication: MRSA Bacteremia, vanco MARCOS</=0.5  Day of Therapy: 6/15  Current vancomycin regimen:  1500 mg IV q24h  Current vancomycin monitoring method: AUC  Current vancomycin therapeutic monitoring goal: 400-600 mg*h/L    [employer] Prediction of Current Vancomycin Regimen  Loading dose: N/A  Regimen: 1500 mg IV every 24 hours.  Start time: 11:00 on 2023  Exposure target: AUC24 (range)400-600 mg/L.hr   AUC24,ss: 693 mg/L.hr  Probability of AUC24 > 400: 100 %  Ctrough,ss: 23.6 mg/L  Probability of Ctrough,ss > 20: 84 %  Probability of nephrotoxicity (Lodise HAJA ): 24 %      Current estimated CrCl = Estimated Creatinine Clearance: 71.2 mL/min (A) (based on SCr of 1.21 mg/dL (H)).    Creatinine for last 3 days  2023:  9:15 AM Creatinine 1.07 mg/dL  2023:  7:04 AM Creatinine 1.21 mg/dL    Recent Vancomycin Levels (past 3 days)  2023:  8:00 AM Vancomycin 28.9 ug/mL    Vancomycin IV Administrations (past 72 hours)                   vancomycin (VANCOCIN) 1,500 mg in 0.9% NaCl 250 mL intermittent infusion (mg) 1,500 mg New Bag 23 1100     1,500 mg New Bag 23 1137     1,500 mg New Bag 23 1145                Nephrotoxins and other renal medications (From now, onward)    Start     Dose/Rate Route Frequency Ordered Stop    23 1100  vancomycin (VANCOCIN) 1000 mg in dextrose 5% 200 mL PREMIX         1,000 mg  200 mL/hr over 1 Hours Intravenous EVERY 24 HOURS 23 1005               Contrast Orders - past 72 hours (72h ago, onward)    None          Interpretation of levels and current regimen:  Vancomycin level is reflective of AUC greater than 600    Has serum creatinine changed greater than 50% in last 72 hours: No    Urine output:  Good     Renal Function: Stable    [employer] Prediction of Planned New Vancomycin Regimen  Loading dose: N/A  Regimen: 1000 mg IV every 24  hours.  Start time: 11:00 on 06/21/2023  Exposure target: AUC24 (range)400-600 mg/L.hr   AUC24,ss: 474 mg/L.hr  Probability of AUC24 > 400: 89 %  Ctrough,ss: 16.2 mg/L  Probability of Ctrough,ss > 20: 11 %  Probability of nephrotoxicity (Lodise HAJA 2009): 12 %      Plan:  1. Decrease Dose to 1000 mg IV every 24 hours  2. Vancomycin monitoring method: AUC  3. Vancomycin therapeutic monitoring goal: 400-600 mg*h/L  4. Pharmacy will check vancomycin levels as appropriate in 1-3 Days.  5. Serum creatinine levels will be ordered daily for the first week of therapy and at least twice weekly for subsequent weeks.    Samantha Lazo, [facility]

## 2023-06-21 NOTE — PROGRESS NOTES
"   06/20/23 9040   Appointment Info   Signing Clinician's Name / Credentials (PT) Lani Stevens, PT   Rehab Comments (PT) Per ortho notes, pt is 50% PWB on left L/E with posterior hip precautions x 3 months (revision BELLA on 5/26/23)   Living Environment   People in Home significant other   Current Living Arrangements house   Home Accessibility stairs to enter home;stairs within home   Number of Stairs, Main Entrance 2   Stair Railings, Main Entrance none   Number of Stairs, Within Home, Primary six;seven   Stair Railings, Within Home, Primary railings safe and in good condition   Transportation Anticipated family or friend will provide   Living Environment Comments Per chart review, pt lives in split level home with 2 steps to enter, 1 step to main level, then 6 up to bedroom/bathroom or 6 down to family room   Self-Care   Usual Activity Tolerance fair   Current Activity Tolerance poor   Regular Exercise No   Equipment Currently Used at Home cane, straight;grab bar, toilet;grab bar, tub/shower;shower chair;walker, rolling;wheelchair, manual   Fall history within last six months no   Activity/Exercise/Self-Care Comment Due to multiple medical complications, pt has been bed bound x 6 months; total assist & use of lift for bed to chair   General Information   Onset of Illness/Injury or Date of Surgery 06/17/23   Referring Physician Raymond Garcia PA-C   Patient/Family Therapy Goals Statement (PT) None stated today; very frustrated regarding continual medical complications   Pertinent History of Current Problem (include personal factors and/or comorbidities that impact the POC) Per chart reveiw, \"72 M WITH pmh sig for DM, 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 S/p explant of L hip prosthesis debridement and antibiotic spacer and left hip revision arthroplasty and reimplantation 5/26/23 complicated by profound deconditioning and malnutrition. " "Patient subsequently admitted to TCU.      Past 2 days patient developed AMS, hypoxia, and found to have MRSA bacteremia on 6/14 (2/2 bottles), 6/15 (2/2 bottles) and 6/17 (2/2 bottles). Patient started on IV vancomycin and ID consulted. Left hip ultrasound negative for abscess/fluid. He is recommended for KEILA due to previous poor windows with TTE, thus was transferred to [place] on 6/17\" Pt now transferred to [facility] per ortho for further management.   Existing Precautions/Restrictions no hip IR;90 degree hip flexion;no pivoting or twisting;no hip ADD past midline;oxygen therapy device and L/min;weight bearing   Weight-Bearing Status - LLE partial weight-bearing (% in comments)  (50%)   General Observations Pt supine upon PTs arrival; agrees to sit EOB with encouragement   Cognition   Affect/Mental Status (Cognition) WFL   Orientation Status (Cognition) oriented x 3   Follows Commands (Cognition) WFL   Behavioral Issues other (see comments)  (per chart review, pt has h/o refusing to participate with PT/OT intervention; has had behavioral contracts set-up which pt did not comply with)   Pain Assessment   Patient Currently in Pain Yes, see Vital Sign flowsheet   Integumentary/Edema   Integumentary/Edema Comments left hip incision viewed & intact; drain intact; only areas viewed during session this date   Posture    Posture Forward head position;Protracted shoulders;Kyphosis   Range of Motion (ROM)   ROM Comment significant limitations in left L/E ROM due to pain - only allows minimal movement throughout session   Strength (Manual Muscle Testing)   Strength Comments left L/E with significant limitations due to pain; generalized weakness due to prolonged inactivity   Bed Mobility   Comment, (Bed Mobility) Pt completes minimal rolling left & right using bed rails & PT supporting left L/E (just enough movement to attempt to smooth out drawsheet)   Transfers   Comment, (Transfers) Supine to sitting EOB with assist x " "2; use of adjustable features of bed to assist   Gait/Stairs (Locomotion)   Comment, (Gait/Stairs) nonambulatory at this time   Balance   Balance Comments sitting - fair, requires bilateral U/E support; difficulty related to limited tolerance to shift weight onto left hip   Sensory Examination   Sensory Perception Comments reports significant left foot nerve pain - will not allow PT to touch left foot; \"feels like lightning striking my foot\"   Clinical Impression   Criteria for Skilled Therapeutic Intervention Yes, treatment indicated   PT Diagnosis (PT) impaired mobility   Influenced by the following impairments pain, decreased strength, left hip BELLA precautions & weight bearing restriction   Functional limitations due to impairments Decreased ability to complete bed mobility & transfers without assist x 2 &/or use of lifting equipment   Clinical Presentation (PT Evaluation Complexity) Evolving/Changing   Clinical Presentation Rationale based upon subjective information provided, objective exam findings, medical history & clinical judgement   Clinical Decision Making (Complexity) low complexity   Planned Therapy Interventions (PT) bed mobility training;patient/family education;transfer training;wheelchair management/propulsion training   Risk & Benefits of therapy have been explained evaluation/treatment results reviewed;care plan/treatment goals reviewed;participants voiced agreement with care plan;participants included;patient   Clinical Impression Comments Lengthy hospitalizations & rehab stays with approximately 6 months of being in bed; has h/o refusals with prior attempts at PT intervention. Will initiate intervention at 3x/wk frequency & re-assess for adjustment in frequency pending participation   PT Total Evaluation Time   PT Eval, Low Complexity Minutes (84719) 10   Physical Therapy Goals   PT Frequency 3x/week   PT Predicted Duration/Target Date for Goal Attainment 07/04/23   PT: Bed Mobility Minimal " assist;Supine to/from sit;Rolling;Within precautions  (with use of adjustable features of hospital bed)   PT: Transfers Moderate assist;Sit to/from stand;Bed to/from chair;Assistive device;Within precautions   PT: Wheelchair Mobility 50 feet;Caregiver SBA;manual wheelchair   PT Discharge Planning   PT Plan PT: continue to work on bed mobility using adjustable features of bed; ? trial of liko system to support left L/E for pt to control during transition to sitting EOB   PT Discharge Recommendation (DC Rec) Transitional Care Facility;Long term care facility   PT Rationale for DC Rec Pt with very limited activity tolerance following prolonged hospital & rehab course; may benefit from TCU to further participate in rehab intervention or may need LTC if rehab not an option   PT Brief overview of current status Total assist of 2; moves very slowly & needs to be directing care/assistance   Total Session Time   Total Session Time (sum of timed and untimed services) 10

## 2023-06-21 NOTE — TELEPHONE ENCOUNTER
Call was placed to unit 5  and I let them know that Don's appointment on Monday would be rescheduled as he is going into the OR today.  I let them know I would call back with appointment details once surgery is completed and they determine when they would like to follow-up with patient.  Callback number to clinic was provided if he had further questions.

## 2023-06-21 NOTE — PROGRESS NOTES
Orthopedic Surgery Progress Note: 06/21/2023    Subjective:   No acute events overnight. Pain well-controlled. NPO at midnight in anticipation of OR. Voiding spontaneously. Not yet moving bowels. Discussed Psychiatry consult due to mention of SI in the setting of limb loss yesterday but patient refused.    Objective:   /51 (BP Location: Left arm)   Pulse 69   Temp (!) 95.9  F (35.5  C) (Oral)   Resp 16   SpO2 97%   No intake/output data recorded.  General: NAD. Resting comfortably in bed.  Respiratory: Breathing comfortably on RA.  Drain Output:  Output by Drain (mL) 06/19/23 0700 - 06/19/23 1459 06/19/23 1500 - 06/19/23 2259 06/19/23 2300 - 06/20/23 0659 06/20/23 0700 - 06/20/23 1459 06/20/23 1500 - 06/20/23 2259 06/20/23 2300 - 06/21/23 0659 06/21/23 0700 - 06/21/23 0715   Closed/Suction Drain Left Thigh 5 10  40  15    Musculoskeletal: Focused exam of the left lower extremity: Drain in place. Fires GSC, TA, FHL, and EHL. SILT Sa/Rice/SP/DP/T nerve distributions. Brisk capillary refill.     Laboratory Data:  Lab Results   Component Value Date    WBC 8.9 06/20/2023    HGB 7.3 (L) 06/20/2023     06/20/2023      Assessment & Plan:   Waldo Bustos is a 72 year old male status post left revision total hip arthroplasty on 5/26/2023 with Dr. Meyers now presenting with left hip prosthetic joint infection.     Goals for Today:  - Plan is for OR with Dr. Meyers today    Orthopedic Surgery primary  - Activity: Up with assist until independent. Posterior hip precautions x 3 months (no flexion beyond 90 degrees, no adduction beyond midline, and no internal rotation beyond midline).  - Weightbearing Status: WBAT LLE.  - Pain Management: Transition from IV to PO as tolerated.  - Antibiotics: Vancomycin per ID.  - Diet: NPO in anticipation of OR.  - DVT Prophylaxis: SCDs and aspirin 162 mg daily x 4 weeks.  - Imaging: None pending.  - Labs: Labs PRN.  - Bracing/Splinting: None.  - Dressings: Keep dressing C/D/I x  2 weeks.  - Drains: GUERRERO drain in place.  - Tran Catheter: None.   - Physical Therapy/Occupational Therapy: Evaluation and treatment.  - Cultures: None.   - Consults: Hospitalist.  - Follow-up: TBD    - Disposition: Plan is for OR with Dr. Meyers today    Orthopedic Surgery staff for this patient is Dr. Meyers.    ------------------------------------------------------------------------------------------    Nir Cronin MD  Orthopedic Surgery PGY4

## 2023-06-21 NOTE — ANESTHESIA PREPROCEDURE EVALUATION
Anesthesia Pre-Procedure Evaluation    Patient: Waldo Bustos   MRN: [id number] : 1950        Procedure : Procedure(s):  REVISION, ARTHROPLASTY, HIP, WITH FEMORAL HEAD OR ACETABULAR LINER REPLACEMENT, WITH IRRIGATION AND DEBRIDEMENT LEFT          Past Medical History:   Diagnosis Date     Arthritis 5 years ago     Chronic osteoarthritis Not sure     Diabetes (H) 10-12 years ago     DVT (deep venous thrombosis) (H)      Dyslipidemia      Gastroesophageal reflux disease      History of blood transfusion      Iliopsoas abscess (H)      Infection of prosthetic hip joint (H)      JAIR (obstructive sleep apnea)      Pulmonary embolism (H)      RA (rheumatoid arthritis) (H) Not sure     Reduced vision 2-3 years ago    Cataract Surgery on both eyes     Venous insufficiency       Past Surgical History:   Procedure Laterality Date     ARTHROPLASTY REVISION HIP Left 2023    Procedure: Revision Left Total Hip Arthroplasty;  Surgeon: Rom Meyers MD;  [facility]     ASPIRATION NEEDLE HIP Left 2023    Procedure: ARTHROCENTESIS, LEFT HIP;  Surgeon: Rom Meyers MD;  [facility]: [facility]     Cataract       COLONOSCOPY       EGD       IR FINE NEEDLE ASPIRATION W ULTRASOUND  3/6/2023     IR IVC FILTER PLACEMENT      removed     IRRIGATION AND DEBRIDEMENT HIP, PLACE ANTIBIOTIC CEMENT BEADS / SPACER Left 2022    Procedure: and Reconstruction Using G 20 Prosthetic Antibiotic Cement Spacer;  Surgeon: Rom Meyers MD;  [facility]     REMOVE ANTIBIOTIC CEMENT BEADS / SPACER HIP Left 2023    Procedure: Explantation of Left Hip Antibiotic Spacer;  Surgeon: Rom Meyers MD;  [facility]     REMOVE HARDWARE ARTHROPLASTY HIP Left 2022    Procedure: Explantation of Chronic  Infected Left Total Hip Arthroplasty, Extended Trochanteric Osteotomy with Extensive Debridement;  Surgeon: Rom Meyers MD;  [facility]     SYNOVIAL BIOPSY HIP Left 2023    Procedure: BIOPSY, SYNOVIUM,  LEFT  HIP, percutaneous;  Surgeon: Rom Meyers MD;  Location:  OR     [facility] PELVIS/HIP JOINT SURGERY UNLISTED       [facility] STOMACH SURGERY PROCEDURE UNLISTED  1954    2 Hernia surgeries as a child      Allergies   Allergen Reactions     Sulfa Antibiotics      Other reaction(s): *Unknown - Childhood Rxn     Tizanidine Other (See Comments)     Frequent urination; causes drowsiness, and dry mouth        Social History     Tobacco Use     Smoking status: Never     Smokeless tobacco: Never   Substance Use Topics     Alcohol use: No      Wt Readings from Last 1 Encounters:   06/16/23 115.2 kg (254 lb)        Anesthesia Evaluation   Pt has had prior anesthetic. Type: General.        ROS/MED HX  ENT/Pulmonary:     (+) sleep apnea, uses CPAP,     Neurologic:  - neg neurologic ROS     Cardiovascular:     (+) Dyslipidemia hypertension-----Previous cardiac testing   Echo: Date: Results:  Interpretation Summary  No vegetation or mass identified. No significant valvular abnormalities were  Noted.  EF 55-60%  Stress Test: Date: Results:    ECG Reviewed: Date: Results:    Cath: Date: Results:      METS/Exercise Tolerance: 3 - Able to walk 1-2 blocks without stopping    Hematologic:     (+) History of blood clots (h/o DVT/PE- abixaban on hold since 5/22), anemia,     Musculoskeletal:   (+) arthritis,     GI/Hepatic:     (+) GERD,     Renal/Genitourinary:       Endo:     (+) type II DM, Obesity (BMI 37),     Psychiatric/Substance Use:       Infectious Disease: Comment: H/o infected L hip prosthesis s/p explant and antibiotic spacer placement, has now completed full course of antibiotic treatment      Malignancy:  - neg malignancy ROS     Other:  - neg other ROS          Physical Exam    Airway        Mallampati: III   TM distance: > 3 FB   Neck ROM: full   Mouth opening: > 3 cm    Respiratory Devices and Support         Dental       (+) Multiple visibly decayed, broken teeth    B=Bridge, C=Chipped, L=Loose,  M=Missing    Cardiovascular   cardiovascular exam normal          Pulmonary   pulmonary exam normal            Other findings: Lab Results      Component                Value               Date                      WBC                      10.1                06/21/2023            Lab Results      Component                Value               Date                      RBC                      2.46                06/21/2023            Lab Results      Component                Value               Date                      HGB                      7.3                 06/21/2023            Lab Results      Component                Value               Date                      HCT                      24.1                06/21/2023            No components found for: MCT  Lab Results      Component                Value               Date                      MCV                      98                  06/21/2023            Lab Results      Component                Value               Date                      MCH                      29.7                06/21/2023            Lab Results      Component                Value               Date                      MCHC                     30.3                06/21/2023            Lab Results      Component                Value               Date                      RDW                      14.3                06/21/2023            Lab Results      Component                Value               Date                      PLT                      354                 06/21/2023              OUTSIDE LABS:  CBC:   Lab Results   Component Value Date    WBC 10.1 06/21/2023    WBC 8.9 06/20/2023    HGB 7.3 (L) 06/21/2023    HGB 7.3 (L) 06/20/2023    HCT 24.1 (L) 06/21/2023    HCT 25.0 (L) 06/20/2023     06/21/2023     06/20/2023     BMP:   Lab Results   Component Value Date     06/20/2023     06/19/2023    POTASSIUM 4.0 06/20/2023    POTASSIUM 4.2 06/19/2023     CHLORIDE 104 06/20/2023    CHLORIDE 105 06/19/2023    CO2 25 06/20/2023    CO2 22 06/19/2023    BUN 28.6 (H) 06/20/2023    BUN 26.6 (H) 06/19/2023    CR 1.21 (H) 06/20/2023    CR 1.07 06/19/2023     (H) 06/21/2023     (H) 06/21/2023     COAGS: No results found for: PTT, INR, FIBR  POC: No results found for: BGM, HCG, HCGS  HEPATIC:   Lab Results   Component Value Date    ALBUMIN 2.3 (L) 06/20/2023    PROTTOTAL 5.9 (L) 06/20/2023    ALT 14 06/20/2023    AST 14 06/20/2023    ALKPHOS 93 06/20/2023    BILITOTAL 0.2 06/20/2023     OTHER:   Lab Results   Component Value Date    PH 7.39 06/15/2023    LACT 1.3 03/20/2023    A1C 5.6 06/16/2023    MAIKOL 8.0 (L) 06/20/2023    MAG 1.7 06/20/2023    TSH 1.23 06/16/2023    CRP 3.6 02/06/2023    SED 77 (H) 01/09/2023       Anesthesia Plan    ASA Status:  3   NPO Status:  NPO Appropriate    Anesthesia Type: General.     - Airway: ETT   Induction: Intravenous, Propofol.      Techniques and Equipment:     - Lines/Monitors: 2nd IV     - Blood: T&C     Consents    Anesthesia Plan(s) and associated risks, benefits, and realistic alternatives discussed. Questions answered and patient/representative(s) expressed understanding.    - Discussed:     - Discussed with:  Patient    Use of blood products discussed: Yes.     - Discussed with: Patient.     - Consented: consented to blood products            Reason for refusal: other.     Postoperative Care    Pain management: IV analgesics, Oral pain medications, Multi-modal analgesia.   PONV prophylaxis: Ondansetron (or other 5HT-3), Dexamethasone or Solumedrol     Comments:           H&P reviewed: Unable to attach H&P to encounter due to EHR limitations. H&P Update: appropriate H&P reviewed, patient examined. No interval changes since H&P (within 30 days).         Roland Jordan DO

## 2023-06-21 NOTE — PLAN OF CARE
Occupational Therapy: Orders received. Chart reviewed and discussed with care team.? Occupational Therapy not indicated due to pt w/ hx of poor participation and currently only tolerating x 1 rehab discipline. PT to follow and will indicate if OT should be consulted again.? Defer discharge recommendations to PT/ortho.? Will complete orders.

## 2023-06-21 NOTE — PROGRESS NOTES
PACU to Inpatient Nursing Handoff    Patient Waldo Bustos is a 72 year old male who speaks English.   Procedure Procedure(s):  IRRIGATION AND DEBRIDEMENT LEFT HIP,   Surgeon(s) Primary: Rom Meyers MD  Resident - Assisting: Angelito Garcia MD     Allergies   Allergen Reactions     Sulfa Antibiotics      Other reaction(s): *Unknown - Childhood Rxn     Tizanidine Other (See Comments)     Frequent urination; causes drowsiness, and dry mouth         Isolation  Contact     Past Medical History   has a past medical history of Arthritis (5 years ago), Chronic osteoarthritis (Not sure), Diabetes (H) (10-12 years ago), DVT (deep venous thrombosis) (H), Dyslipidemia, Gastroesophageal reflux disease, History of blood transfusion, Iliopsoas abscess (H), Infection of prosthetic hip joint (H), JAIR (obstructive sleep apnea), Pulmonary embolism (H), RA (rheumatoid arthritis) (H) (Not sure), Reduced vision (2-3 years ago), and Venous insufficiency.    Anesthesia General   Dermatome Level     Preop Meds Not applicable   Nerve block Not applicable   Intraop Meds dexamethasone (Decadron)  fentanyl (Sublimaze): 100 mcg total  ondansetron (Zofran): last given at 1708   Local Meds Yes   Antibiotics Not applicable     Pain Patient Currently in Pain: yes   PACU meds  acetaminophen (Tylenol): 650 mg (total dose) last given at 1844   fentanyl (Sublimaze): 50 mcg (total dose) last given at 1821   hydromorphone (Dilaudid): 0.2 mg (total dose) last given at 1843   Tramadol 50mg given at 1928   PCA / epidural No   Capnography     Telemetry ECG Rhythm: Normal sinus rhythm   Inpatient Telemetry Monitor Ordered? No        Labs Glucose Lab Results   Component Value Date     06/21/2023     02/06/2023       Hgb Lab Results   Component Value Date    HGB 7.3 06/21/2023       INR No results found for: INR   PACU Imaging Not applicable     Wound/Incision Wound Arm (Active)   Wound Bed Red 06/21/23 1751   Daria-wound Assessment Erythema  06/18/23 1734   Drainage Amount None 06/21/23 1751   Wound Care/Cleansing Other (Comment) 06/01/23 1100   Dressing Open to air 06/18/23 1734   Number of days: 26       Wound Groin Pressure injury suspected hospital acquired NA (Active)   Wound Bed Moist;Red 06/18/23 1734   Daria-wound Assessment Erythema;Moist 06/19/23 0800   Drainage Amount None 06/19/23 0800   Wound Care/Cleansing Wound cleanser 06/19/23 0800   Dressing Other (Comment) 06/18/23 1734   Dressing Status Clean, dry, intact 05/28/23 2000   Number of days:        Wound Buttocks Pressure injury suspected hospital acquired (Active)   Wound Bed Other (Comment) 06/21/23 1735   Daria-wound Assessment Erythema 06/20/23 1551   Dressing Protective barrier 06/21/23 1735   Number of days: 1       Incision/Surgical Site 05/26/23 Left Hip (Active)   Incision Assessment WDL 06/21/23 0800   Daria-Incision Assessment Erythema 06/18/23 1734   Closure Approximated 06/21/23 0800   Incision Drainage Amount None 06/21/23 0800   Drainage Description Other (Comment) 06/18/23 1734   Dressing Intervention Open to air / No Dressing 06/20/23 2038   Number of days: 26       Incision/Surgical Site 06/21/23 Anterior;Right Greater Trochanter (Active)   Incision Assessment UTV 06/21/23 1751   Closure DEEP;Other (Comment) 06/21/23 1751   Dressing Intervention Clean, dry, intact 06/21/23 1751   Number of days: 0      CMS        Equipment Not applicable   Other LDA       IV Access Peripheral IV 06/21/23 Left Lower forearm (Active)   Site Assessment [facility] 06/21/23 1748   Line Status Infusing 06/21/23 1748   Dressing Transparent 06/21/23 1748   Dressing Status clean;dry;intact 06/21/23 1748   Phlebitis Scale 0-->no symptoms 06/21/23 1748   Number of days: 0       Peripheral IV 06/21/23 Left Antecubital fossa (Active)   Site Assessment [facility] 06/21/23 1748   Line Status Saline locked 06/21/23 1748   Dressing Transparent 06/21/23 1748   Dressing Status clean;dry;intact 06/21/23 1748   Phlebitis  Scale 0-->no symptoms 06/21/23 1748   Number of days: 0      Blood Products Not applicable EBL See post op note mL   Intake/Output Date 06/21/23 0700 - 06/22/23 0659   Shift 8476-2360 9103-6175 7252-7768 24 Hour Total   INTAKE   I.V.  1200  1200   Shift Total  1200  1200   OUTPUT   Drains 15   15   Blood  100  100   Shift Total 15 100  115   Weight (kg)          Drains / Tran Closed/Suction Drain Left Thigh (Active)   Site Description [facility] 06/21/23 1340   Dressing Status Drainage - Minimal 06/20/23 2038   Drainage Appearance Serosanguenous 06/20/23 1500   Status To bulb suction 06/20/23 2038   Output (ml) 15 ml 06/21/23 0930   Number of days: 3       Closed/Suction Drain Anterior;Left Thigh Bulb 15 Armenian (Active)   Site Description WDL X 06/21/23 1800   Dressing Status Normal: Clean, Dry & Intact 06/21/23 1800   Drainage Appearance Bloody/Bright Red 06/21/23 1800   Status To bulb suction 06/21/23 1800   Number of days: 0      Time of void PreOp Time of Void Prior to Procedure: 1000 (06/21/23 1300)    PostOp Voided (mL): 250 mL (06/20/23 1909)  Urine Occurrence: 1 (06/21/23 0930)  Urinary Incontinence: Yes (06/19/23 1200)    Diapered? No   Bladder Scan Bladder Scan Volume (mL): 76 ml (06/21/23 1900)    mL (06/19/23 1400)  none at time of note will verbal any change     Vitals    B/P: 105/55  T: 98.1  F (36.7  C)    Temp src: Axillary  P:  Pulse: 84 (06/21/23 1915)          R: 12  O2:  SpO2: 94 %    O2 Device: Nasal cannula (06/21/23 1915)    Oxygen Delivery: 3 LPM (06/21/23 1915)    FiO2 (%): 100 % (06/21/23 1258)    Family/support present Claire girlfriend/not in PACU   Patient belongings     Patient transported on bed   DC meds/scripts (obs/outpt) Not applicable   Inpatient Pain Meds Released? No new med ordered written       Special needs/considerations None   Tasks needing completion None       Sia Gonzales, RN  ASCOM [id number]

## 2023-06-21 NOTE — ANESTHESIA CARE TRANSFER NOTE
Patient: Waldo Bustos    Procedure: Procedure(s):  IRRIGATION AND DEBRIDEMENT LEFT HIP,       Diagnosis: Infection of prosthetic joint, initial encounter (H) [T84.50XA]  Diagnosis Additional Information: No value filed.    Anesthesia Type:   General     Note:    Oropharynx: oropharynx clear of all foreign objects  Level of Consciousness: drowsy  Oxygen Supplementation: face mask  Level of Supplemental Oxygen (L/min / FiO2): 8  Independent Airway: airway patency satisfactory and stable  Dentition: dentition unchanged  Vital Signs Stable: post-procedure vital signs reviewed and stable  Report to RN Given: handoff report given  Patient transferred to: PACU    Handoff Report: Identifed the Patient, Identified the Reponsible Provider, Reviewed the pertinent medical history, Discussed the surgical course, Reviewed Intra-OP anesthesia mangement and issues during anesthesia, Set expectations for post-procedure period and Allowed opportunity for questions and acknowledgement of understanding      Vitals:  Vitals Value Taken Time   BP 97/51 06/21/23 1745   Temp 36.7  C (98.1  F) 06/21/23 1735   Pulse 73 06/21/23 1750   Resp 12 06/21/23 1750   SpO2 100 % 06/21/23 1750   Vitals shown include unvalidated device data.    Electronically Signed By: JOSEPH Lee CRNA  June 21, 2023  5:51 PM

## 2023-06-21 NOTE — BRIEF OP NOTE
[facility]    Brief Operative Note    Pre-operative diagnosis: Infection of prosthetic joint, initial encounter (H) [T84.50XA]  Post-operative diagnosis Same as pre-operative diagnosis    Procedure: Procedure(s):  IRRIGATION AND DEBRIDEMENT LEFT HIP,  Surgeon: Surgeon(s) and Role:     * Rom Meyers MD - Primary     * Angelito Garcia MD - Resident - Assisting  Anesthesia: General   Estimated Blood Loss: 100 mL from 6/21/2023  2:17 PM to 6/21/2023  5:33 PM      Drains: Atif-Betancourt  Specimens:   ID Type Source Tests Collected by Time Destination   A : LEFT HIP #1 Tissue Hip, Left ANAEROBIC BACTERIAL CULTURE ROUTINE, AEROBIC BACTERIAL CULTURE ROUTINE Rom Meyers MD 6/21/2023  3:38 PM    B : LEFT HIP #2 Tissue Hip, Left ANAEROBIC BACTERIAL CULTURE ROUTINE, AEROBIC BACTERIAL CULTURE ROUTINE Rom Meyers MD 6/21/2023  3:39 PM    C : LEFT HIP #3 Tissue Hip, Left ANAEROBIC BACTERIAL CULTURE ROUTINE, AEROBIC BACTERIAL CULTURE ROUTINE Rom Meyers MD 6/21/2023  3:39 PM    D : LEFT HIP #4 Tissue Hip, Left ANAEROBIC BACTERIAL CULTURE ROUTINE, AEROBIC BACTERIAL CULTURE ROUTINE Rom Meyers MD 6/21/2023  3:39 PM    E : LEFT HIP #5 Tissue Hip, Left ANAEROBIC BACTERIAL CULTURE ROUTINE, AEROBIC BACTERIAL CULTURE ROUTINE Rom Meyers MD 6/21/2023  3:39 PM      Findings:   No purulence in the hip joint. L hip aspiration attempted and was dry. I&D performed. Hip was very stable. Did not do head/liner exchange.  Complications: None.  Implants: * No implants in log *    Assessment: Waldo Bustos is a 72 year old male s/p L hip I&D (6/21)    Plan:  WBAT with posterior hip precautions  F/U OR cultures  F/U Drain output  Abx: vanc and ceftriaxone  Pain control  DVT ppx  PT/OT, OOB  Appreciate medicine recs  Appreciate ID recs  Pull drain at 2 weeks  F/U 2 weeks  Dispo planning    Future Appointments   Date Time Provider Department Center   6/22/2023 11:30 AM  Maria M Hunter, PT [facility] [place]       Angelito Garcia MD  Adult Reconstructive Surgery Fellow  Department of Orthopaedic Surgery, [facility]

## 2023-06-21 NOTE — PLAN OF CARE
Pt A&Ox4. VSS. Afebrile. Lung sounds CTA. Maintaining sats on 2L 02 via NC, on continuous pulse-ox. Bowel sounds active, pt unsure of date of LBM, per chart LBM 6/16. CMS and neuro's are intact. Pt reports numbness and tingling in L foot, otherwise denies. Denied assessment of L foot pulse, states he doesn't want his leg or foot touched. Pt declined a full assessment. Denies nausea, shortness of breath, and chest pain. L hip pain managed w/ scheduled Robaxin, prn Tramadol. Incontinent at times, able to use urinal, continue to offer toileting. Tolerating regular diet,  at HS and 143 at 0200. L hip incision is CDI, dried drainage to GUERRERO site dressing. GUERRERO in place, output 15mL. Pt has been refusing repositioning, skin and moisture check, continue to offer reposition. Pt also refused surgical scrub last evening attempted to offer again this morning without success. PIV is patent and SL in R arm. Call light is within reach, pt appears to be resting comfortably between cares. Will continue to monitor.

## 2023-06-21 NOTE — PROGRESS NOTES
Attending orthopaedic surgery:    Background history:  DX:  1. Morbid obesity Body mass index is 45.2 kg/m .  2. Diabetes mellitus  3. Left BELLA prosthetic joint infection  4. Hx of PE. Long term anticoag     TREATMENTS:  1. 3/7/2018, left two incision total hip arthroplasty (Heller)  2. 1/23/2019, revision left total hip arthroplasty (Heller) [facility]. IV Ceftriaxone x 4 weeks for C Acnes  3. 2/2021, L hip aspiration (CDI)  4/6/2022, Iliopsoas aspiration, C Acnes. Treated with several months of amoxicillin  4. 4/27/2022, drain placement. C Acnes.  5. 11/14/2022 L hip aspiration 3+ Proteus mirabilis , 4+ Porphyromonas sp.  6. 11/22/2022, explantation L BELLA chronic PJI, non-articulating PMMA spacer (Luigi) [facility]. Proteus mirabilis, Finegoldia magna, zosyn x 1 wk > po cipro/po metronidazole x 6 weeks from 11/22/22.  7. 4/7/2023, percutaneous synovial biopsy for cultures 5/5 (-)  8. 5/26/2023, Antibx spacer removal, 2nd stage reimplant revision L BELLA. 5/5 cultures (-)  9. 6/14/2023, MRSA bacteremia with acute postop L BELLA PJI    I have spoke with Waldo who is known to me after his recent surgery to his respective joint after treatment for a negative Proteus and a Porphyromonas left BELLA infection.    Patient is admitted for acute onset of MRSA infection and CT scan demonstrates evidence of gas formation and concern for abscess at L BELLA site.  I discussed the acute postoperative infection issues with patient who visibly frustrated but nonetheless understands the need to proceed with a drainage and debridement procedure.  If implant retention strategy management fails due to recurrent PJI infection, a permanent girdlestone arthroplasty may be  Necessary.    Rom Meyers MD  [employer]ush[employer] Family Professor  Oncology and Adult Reconstructive Surgery  Dept Orthopaedic Surgery, [facility]  [phone] office, [phone] pager  [email or web address]    Total combined visit time and work time before and after clinic  visit on encounter date = 50 min

## 2023-06-22 NOTE — PROGRESS NOTES
Orthopedic Surgery Progress Note: 06/22/2023    Subjective:   No acute events overnight. Underwent left hip irrigation and debridement yesterday. Pain well-controlled. Not yet moving bowels. No new concerns or complaints.    Objective:   /51 (BP Location: Right arm)   Pulse 76   Temp 97.4  F (36.3  C) (Oral)   Resp 16   SpO2 99%   No intake/output data recorded.  General: NAD. Resting comfortably in bed.  Respiratory: Breathing comfortably on RA.  Drain Output:  Output by Drain (mL) 06/20/23 0700 - 06/20/23 1459 06/20/23 1500 - 06/20/23 2259 06/20/23 2300 - 06/21/23 0659 06/21/23 0700 - 06/21/23 1459 06/21/23 1500 - 06/21/23 2259 06/21/23 2300 - 06/22/23 0659 06/22/23 0700 - 06/22/23 0725   Closed/Suction Drain Left Thigh 40  15 15      Closed/Suction Drain Anterior;Left Thigh Bulb 15 Armenian     35 60    Musculoskeletal: Focused exam of the left lower extremity: Drain in place. Fires GSC, TA, FHL, and EHL. SILT Sa/Rice/SP/DP/T nerve distributions. Brisk capillary refill.     Laboratory Data:  Lab Results   Component Value Date    WBC 10.1 06/21/2023    HGB 7.3 (L) 06/21/2023     06/21/2023      Assessment & Plan:   Waldo Bustos is a 72 year old male status post left revision total hip arthroplasty on 5/26/2023 with Dr. Meyers now presenting with left hip prosthetic joint infection status post left hip irrigation and debridement on 6/21/2023 with Dr. Meyers.    Goals for Today:  - Pain control and mobilization  - Discharge planning    Orthopedic Surgery primary  - Activity: Up with assist until independent. Posterior hip precautions x 3 months (no flexion beyond 90 degrees, no adduction beyond midline, and no internal rotation beyond midline).  - Weightbearing Status: WBAT LLE.  - Pain Management: Transition from IV to PO as tolerated.  - Antibiotics: Vancomycin and ceftriaxone per ID.  - Diet: OK for a diet from an orthopedic perspective.  - DVT Prophylaxis: SCDs and PTA apixaban 5 mg BID.  -  Imaging: None pending.  - Labs: Labs PRN.  - Bracing/Splinting: Abduction pillow while in bed.  - Dressings: Keep dressing C/D/I x 2 weeks.  - Drains: GUERRERO drain in place (remove in 2 weeks).  - Tran Catheter: None.   - Physical Therapy/Occupational Therapy: Evaluation and treatment.  - Cultures: None.   - Follow-up: Clinic with Dr. Meyers's team in 2 weeks without radiographs.    - Disposition: Pending progress with therapies, pain control on orals, and medical stability; anticipate discharge to TCU with timing TBD.    Orthopedic Surgery staff for this patient is Dr. Meyers.    ------------------------------------------------------------------------------------------    Nir Cronin MD  Orthopedic Surgery PGY4

## 2023-06-22 NOTE — OP NOTE
OPERATIVE REPORT    Date of Surgery:   6/21/2023     Preop Dx:   Left hip PJI    Postop Dx:   Left hip PJI.      Procedure:  1. L hip arthrotomy with irrigation and excisional debridement of the skin, subcutaneous tissue, fascia, muscle, and hip joint.     2. L hip aspiration (separate procedure)     Attending Surgeon:  Rom Meyers MD     Assistants:  Angelito Garcia MD     Anesthesia:   General Anesthesia with ET tube     Complications:   None     EBL:   100cc     IV fluids:   See anesthesia records     Components used:  No components exchanged     Findings:  No purulence in the hip joint. No serous fluid collection either.  No clear evidence of a prosthetic joint infection.  Prior gas seen on CT likely related to drain placement.  Prior positive culture likely related to suboptimal specimen from drain and pateint's prior MRSA bacteremia. L hip aspiration attempted and was dry. I&D performed. Hip was very stable. Decided against performing an implant exchange as no clear evidence of infection present and the involved morbidity with dissection required in a densely scarred tissue bed to dislocate the hip in the setting of a femur with compromised structural integrity after prior extended trochanteric osteotomy.      Indications for procedure:  The patient is a 72M who presents with suspected hip PJI. He had a previous BELLA PJI, and was treated with a 2-stage exchange. The 2nd-stage took place on 5/26/2023. The patient presents with bacteremia. Blood cultures showed MRSA. The drain from his hip was cultured and grew 4+ MRSA, 2+ Corynebacterium, 1+ Klebsiella.     We recommended surgical treatment in the form of an I&D, possible head/liner exchange.  The risks, benefits, and alternatives to surgery were explained at length with the patient.  The risks include but are not limited to pain, bleeding, infection, damage to surrounding structures, loosening, mechanical hardware failure, fracture, dislocation, leg-length  "discrepancy, blood clots, COVID-19, the need for future procedures, as well as the risks of anesthesia itself.  The patient understood these risks, agreed to have surgery, and voluntarily signed a written informed consent.     Description of Procedure:  The patient was identified in the preoperative holding area by Dr. Meyers, and the Left lower extremity was then marked.  The patient was then greeted by the anesthesia team who brought the patient back to the operating theater.  The patient was then transferred onto the operating table in the supine position.  General anesthesia was induced without any complications.  The patient was then positioned lateral decubitus position with the Left hip pointing up. All of the bony prominences were well-padded.  The Left lower extremity was then prepped and draped in usual sterile fashion.  A timeout was performed confirming the patient name, date of birth, procedure to be performed, as well as laterality.  Perioperative antibiotics were held prior to commencement of surgery.    The room had 2 setups. A \"dirty\" and a \"clean\" set up. We first began on the \"dirty\" side.    A posterolateral approach to the hip joint was performed using the previously incisional scar. Dissection was carried down to the fascia. Pus was not encountered. There was no rent in the fascia. At this point, we decided to aspirate the hip.     As a separate procedure in a completely different anatomic area and approach, the aspiration was performed with an 18G needle from an anterior approach. About 1cc of fluid was taken from the hip joint. This was insufficient for any lab testing.  We decided to go forward with the I&D of the deeper tissues given the culture findings previously known.    The fascia was incised. Previous sutures were removed. Dissection was carried down to the hip joint. There was no evidence of purulence. At this point, we attempted to dislocate the hip. The hip was very stable, and we " "could not dislocate the hip with the exposure we had. With no pus in the hip, and with higher morbidity of a bigger exposure to dislocate the hip, we decided that it was best to forgo the head/liner exchange.     We irrigated the hip joint with 3L of normal saline. We then mechanically debrided the skin, subcutaneous tissue, muscle and bone, consisting of the  femoral neck and femoral neck with a bovie scratch pad. We then washed the joint with 1L of bactisure. Next, we soaked the wound with full-strength betadine.     We then re-gowned and switched over to the \"clean\" side. Everything in the \"dirty\" side is now considered to be contaminated. The we-redraped the left lower extremity.     Next, we inserted a 15F GUERRERO drain in the hip joint. We then closed the fascial with #1 PDS. We then closed the deep layer with #0 PDS. We used 2-0 PDS for the subcutaneous layer and 3-0 Monocryl for skin.     The wound was then dressed with dermabond, alginate, and tagederms. An abduction pillow was placed between the patient's legs. The patient was then awoken from anesthesia without any complications.  The patient was then transferred onto the hospital bed and taken to the PACU without any complications.  Multiple sponge and needle counts were performed and were correct at the end of the case.  Dr. Meyers was present and participated throughout the key portions of the procedure.      Postoperative plan:  WBAT with posterior hip precautions  F/U OR cultures  F/U Drain output  Abx: vanc and ceftriaxone  Pain control  DVT ppx  PT/OT, OOB  Appreciate medicine recs  Appreciate ID recs  Pull drain at 2 weeks  F/U 2 weeks  Dispo planning    Angelito Garcia MD  Adult Reconstructive Surgery Fellow  Department of Orthopaedic Surgery, [facility]    Attending MD (Dr. Rom Meyers) Attestation:  I was present during the key portions of the procedure and I was immediately available for the entire procedure between opening and " closing.    MD Vanessa Stephenson Family Professor  Oncology and Adult Reconstructive Surgery  Dept Orthopaedic Surgery, [facility]

## 2023-06-22 NOTE — PROGRESS NOTES
Paged that hgb 5.3 this morning    Ordered 2 unit(s) prbc    Jem Malloy DO  Hospitalist    Medicine and Pediatrics  [email or web address]  Pager: [phone]  Available on Vocera

## 2023-06-22 NOTE — PROGRESS NOTES
Medicine Cross Cover Note       Patient by nursing that patient has been having some softer blood pressures postoperatively.  BP as low as 75/37 but has trended back up slightly to 105/37.  Chart reviewed.  Patient did have I&D of left hip today related to prosthetic joint infection.  Preoperatively, his hemoglobin was only 7.3 this morning.  Estimated blood loss during surgery was 100 mL.  There is no bart blood loss at this time per nursing.  Ideally, would like to avoid transfusion in this immediate postoperative setting.  - Bolus 1 L of LR over the next 2 hours and then continue maintenance fluids  - If continues to have soft blood pressures after bolus would recommend hemoglobin recheck and transfusion if indicated  - Ordered CBC for the morning    Dhaval Mcclain PA-C  Internal Medicine [facility]  Pager [phone]

## 2023-06-22 NOTE — PLAN OF CARE
VS: /49   Pulse 73   Temp 97.7  F (36.5  C) (Oral)   Resp 18   SpO2 96%    O2: O2> 95% ORA while awake, uses 1.5-2L O2 when asleep. Snores while sleeping    Output: Strains to void - has bedside urinal at bedside  Bladder scanned for 134 at 1733   Last BM: 6/22 per pt report    Activity: Ax2-3 with lift device  Attempted to work w/ therapy, Ax3 to sit up at bedside, pt reporting pain and weakness - doesn't want anyone to touch his L foot and ankle    Up for meals? Sits up for meals   Skin: L hip incision. Scattered bruising to BUE, swelling to BLE   Pain: Managed with scheduled robaxin, PRN Tylenol and Tramadol.   CMS: LLE numbness due to foot drop per pt report. A&O x4   Dressing: LLE dressing CDI   Diet: Reg, denies nausea/vomiting   On glucose checks before meals    LDA: L PIV forearm - infusing TKO  L PIV brachial - SL   Plan: Continue POC for pain management and intermittent antibiotics - anticipated possible PICC placement    Additional Info: On RN managed potassium - did not have to be replaced today     Low mag today - replaced via IV mag sulfate, scheduled recheck tomorrow AM draw     Low Hgb 5.3 - MD paged - 2 units of blood ordered - given w/ no reaction  At recheck - hgb 6.7- 1 unit of blood ordered - given w/ no reaction

## 2023-06-22 NOTE — PROGRESS NOTES
"CLINICAL NUTRITION SERVICES - REASSESSMENT NOTE     Nutrition Prescription    RECOMMENDATIONS FOR MDs/PROVIDERS TO ORDER:  Encourage oral intakes, offer/provide snacks/supplements PRN between meals  Please obtain weight as able    Malnutrition Status:    Severe malnutrition in the context of acute on chronic illness    Recommendations already ordered by Registered Dietitian (RD):  Updated meal orders and supplement orders to reflect pt preference  Thera-vit-M daily    Future/Additional Recommendations:  Monitor labs, intakes, and weight trends.     EVALUATION OF THE PROGRESS TOWARD GOALS   Diet: Regular  Intake/Tolernace: 50 - 75% of documented meals. Poorly documented.  Snacks/supplements: Glucerna     Pt receiving (on average) 1121 kcal and 46 g protein per day per [employer]. With documented intakes, pt is likely meeting <50% minimum energy and protein needs.     NEW FINDINGS/REVIEW OF SYSTEMS    Nutrition/GI: RD met with pt at bedside. Discussed reasoning behind non-select trays being sent up. Explained importance of nutrition in healing. Pt eating a late breakfast at time of meeting. Commended efforts. Discussed options on the menu that pt enjoys vs does not enjoy so that the nutrition staff in the kitchen can order dishes he is more likely to eat. Discussed plan to add multivitamin, pt agreeable. Patient also okay with Glucerna being sent, enjoys chocolate the most but \"not the dark chocolate one\".\"  Last BM 6/22 per RN note, per pt today, pt had \"started a BM but not finished\"       Weights: Pt with limited weight history prior to admission, with 81 lb (25%) weight loss over 6 months. Pt with 15 lb (6%) weight gain in <2 weeks. Suspect some fluid shifts.  Wt Readings from Last Encounters:   06/16/23 115.2 kg (254 lb)   06/06/23 117.4 kg (258 lb 13.1 oz)   05/26/23 110.4 kg (243 lb 4.8 oz)   05/19/23 110.4 kg (243 lb 4.8 oz)   03/23/23 117 kg (257 lb 14.4 oz)   01/09/23 119.7 kg (264 lb)   11/22/22 147.1 kg " (324 lb 4.8 oz)   11/14/22 142.9 kg (315 lb)   11/09/22 146.8 kg (323 lb 9.6 oz)   05/19/22 136.1 kg (300 lb)   07/03/18 148.9 kg (328 lb 4.8 oz)     Skin: pt refusing assessment of buttocks wound     Labs reviewed   06/19/23 09:15 06/20/23 07:04 06/21/23 08:00 06/22/23 07:11 06/22/23 13:41 06/23/23   Sodium 139 137  137 138 137   Potassium 4.2 4.0  5.4 (H) 5.1 4.7   Urea Nitrogen 26.6 (H) 28.6 (H)  41.9 (H) 45.1 (H) 50.2 (H)   Creatinine 1.07 1.21 (H)  2.22 (H) 2.43 (H) 2.32 (H)   Magnesium  1.7  1.6 (L)  1.9   Phosphorus    5.1 (H) 5.1 (H) 4.7 (H)   Albumin 2.6 (L) 2.3 (L)  2.2 (L) 2.4 (L) 2.4 (L)   CRP Inflammation 118.00 (H)  57.60 (H)      Glucose 179 (H) 141 (H)  189 (H) 155 (H) 132 (H)      11/23/22 06:12 03/06/23 07:33 06/16/23 06:05   Hemoglobin A1C 6.8 (H) 5.8 (H) 5.6      06/20/23 07:04 06/21/23 08:00 06/22/23 07:11 06/22/23 13:41   WBC 8.9 10.1 17.8 (H) 18.1 (H)   Hemoglobin 7.3 (L) 7.3 (L) 5.3 (LL) 6.7 (LL)   Hematocrit 25.0 (L) 24.1 (L) 17.6 (L) 21.4 (L)     Medications reviewed: Ceftriaxone, insulin (novolog), LR, culturell, magnesium, miralax, senna, vancomycin    MALNUTRITION  % Intake: </= 50% for >/= 5 days (severe)  % Weight Loss: > 10% in 6 months (severe)  Subcutaneous Fat Loss: Upper arm:  moderate  Muscle Loss: Temporal:  Moderate, Thoracic region (clavicle, acromium bone, deltoid, trapezius, pectoral):  Severe, Upper arm (bicep, tricep):  Severe and Posterior calf:  Severe   Fluid Accumulation/Edema: Does not meet criteria  Malnutrition Diagnosis: Severe malnutrition in the context of acute on chronic illness    Previous Goals   Patient to consume % of nutritionally adequate meal trays TID, or the equivalent with supplements/snacks.  Evaluation: Not met    Previous Nutrition Diagnosis  Inadequate oral intake related to decrease appetite as evidenced by limited intake since admit   Evaluation: Updated    CURRENT NUTRITION DIAGNOSIS  Inadequate oral intake related to poor appetite,  dislike of hospital foods as evidenced by patient report and documented intakes      INTERVENTIONS  Implementation  Nutrition education for nutrition relationship to health/disease   Medical food supplement therapy - updated per pt preference    Goals  Patient to consume % of nutritionally adequate meal trays TID, or the equivalent with supplements/snacks.    Monitoring/Evaluation  Progress toward goals will be monitored and evaluated per protocol.    Dee Alanis MS, RDN, LDN  RD pager: [phone]  WB Weekend/Holiday Pager: [phone]

## 2023-06-22 NOTE — PLAN OF CARE
Pt arrived from PACU to [facility] at 2000. VSS ex soft BP. Belongings remain with pt.     AOx4. Denies CP, SOB. N/T to LLE  Incontinent of B/B. LBM 6/21. Refuses incontinence pad and brief  Pt c/o L hip and foot pain- tramadol given prior to arrival  LLE dressing CDI. Pt refuses full skin assess. Provided education on infection and proper wound healing. Pt continues to refuse assessment of buttocks wound and repositioning  L forearm PIV infusing LR bolus d/t low BPs (provider notified). L AC PIV SL. GUERRERO in place  Call light within reach at all times. Pt able to make needs known. Continue with POC.

## 2023-06-22 NOTE — PROGRESS NOTES
[facility]    Medicine Progress Note - Hospitalist Service, GOLD TEAM 21    Date of Admission:  6/17/2023    Assessment & Plan   Waldo Bustos is a 72 year old male with a history of type 2 diabetes, hyperlipidemia, chronic back pain, JAIR, DVT/PE on DOAC, and chronic left hip prosthetic joint infection initially admitted to [facility] on 11/22/2022 for scheduled explant of left hip prosthesis, debridement, and reconstruction with antibiotic spacer.  Postop course complicated by profound deconditioning.  He was admitted to TCU initially on 12/23/2022 for physical rehabilitation, however therapies no longer worked with patient after several months of an in bed therapy and his refusals to mobilize.  Underwent biopsy on 4/7/2023 without evidence of infection.  He was transferred back to [facility] for left total hip arthroplasty and reimplantation on 5/26/23 with Dr. Meyers.  Presented again to [facility] for persistent MRSA bacteremia on 6/17.     Brief Hospital Summary:   Pt was admitted to continue infectious workup with MRSA bacteremia.  Admission blood cultures were positive.  ID and Ortho were consulted.  He was continued on vancomycin.  Hip cultures also grew Staph and klebsiella, though ortho suspected this may have been contaminated.  Per ID would hold off on ceftriaxone unless he worsens.  Leukocytosis and inflammatory markers are down trending.  KEILA was obtained on 6/19, which was negative for endocarditis or other findings. Ortho to take for washout today 6/21.      Plan for today:   - ID note pending but likely continue rocephin + vanc in short term, will discuss PICC placement with patient and vascular access today   - follow up urine labs for investigation of HALEY   - follow up pm hgb following 2 unit transfusion for hgb in 5's this morning    MRSA bacteremia due to possible left hip septic arthritis vs other source Blood cultures: 6/14, 6/15  + for MRSA (4/4), and 6/16 (1/2 GPCs) and 6/17 (2/2) GPCs. - started vancomycin 6/15.   6/17 sinovial fluid cultures with staph and klebsiella. Purulent drainage noted at incision site at TCU, CT left hip concerning for infection (no mention of joint, mostly anterior in soft tissue) and couldn't exclude abscess/inf  - Continue vancomycin, added rocephin 6/21; ID recs pending today and depend upon ortho plan moving forward  - blood cultures negative 6/19 and 6/20 (last + was 6/17), trend CRP q48h (downtrending prior to surgery 6/21)    Hypoxemic Respiratory Failure due to immobility, surgery  - patient with fluctuating oxygen requirement, few liters NC  - continue to follow    Acute blood loss anemia following surgery, complicated by use of blood thinner  - transfuse 2 units and monitor for improvement with pm recheck  - watch for bleeding; hold apixaban if need be - had unprovoked dvt in 2018 and would be on eliquis for life probably, at really high risk of recurrent DVT given recent procedures and illness/immobility    Acute Kidney Injury - multifactorial, see below  - baseline creatinine 1.1  - etiology: suspect combo of sepsis/inflammation, hypovolemia, blood loss anemia following surgery  - volume (hypovolemia/congestion): euvolemic but tough to assess based on habitus  - nephrotoxins: no  - diabetes: yes; last A1C < 6, though  - sepsis: lil bit  - anemia: yes  - cardiac performance: normal based on KEILA from 6/19  - inflammation: from surgery and infection  - ordered urine studies, giving NS, 2 unit(s) blood, recheck in the afternoon    S/p left hip explantation of left hip antibiotic spacer and revision of left total hip arthroplasty (5/26/2023)  - ortho performed washout yesterday  -Activity: 50% PWB LLE with posterior hip precautions [id number]  -AFO ordered for foot drop but patient refusing to use  -PT/OT  -Knee to be kept even in a foam leg elevator to relax sciatic nerve  -Pain: Scheduled robaxin 4 times a  day, APAP PRN                 -oxycodone  to 5-7.5 mg discontinued                 -tramadol 25-50 mg every 6 hours PRN     Hx of unprovoked DVT in 2018   - on eliquis 5 mg bid     Altered mental status due to Delirium from illness/hospitalization  resolved  - delirium precuations      Type 2 diabetes mellitus  - Last hemoglobin A1c 5.6% on 6/16/2023.  On glipizide, metformin, Actos, and Victoza prior to hospitalizations and surgery.   - continue ISS    Constipation  -Continue Colace 100 mg twice daily  -Continue senna 1 tablet twice daily  -Continue MiraLAX daily     Hypomagnesemia  -monitor and replete      Severe malnutrition in the context of acute on chronic illness  Goal for patient is to consume % of meals TID. Goal is 7544-3152 kcals/daily. Protein needs- 100-126 grams/daily.   -RD consult and appreciate recs  -regular diet  -Weekly weights, daily I/O     Adjustment disorder with depressed mood  Prolonged grief disorder  Patient has been with depressed mood in setting of ongoing health issues, as evidenced by lack of motivation to work with therapies during both TCU admissions.  Past history of passive suicidal statements. Psychiatry saw patient during hospital admission on 5/31 due to lack of motivation with therapies..  Patient declined interest in starting any psychiatric medications.  Willing to engage with health psychology.   -Outpatient health psychology consult  -Continue to monitor for willingness to start antidepressant therapy     Stable and Resolved Issues:  Hyperlipidemia-continue PTA Lipitor 40 mg daily  JAIR-CPAP when sleeping       Diet: Snacks/Supplements Adult: Other; Glucerna with bkf trays daily; With Meals  Room Service  Regular Diet Adult    DVT Prophylaxis: DOAC  Tran Catheter: Not present  Lines: None     Cardiac Monitoring: None  Code Status: Full Code      Clinically Significant Risk Factors        # Hyperkalemia: Highest K = 5.4 mmol/L in last 2 days, will monitor as  "appropriate     # Hypomagnesemia: Lowest Mg = 1.6 mg/dL in last 2 days, will replace as needed   # Hypoalbuminemia: Lowest albumin = 2.2 g/dL at 6/22/2023  7:11 AM, will monitor as appropriate    # Acute Kidney Injury, unspecified: based on a >150% or 0.3 mg/dL increase in last creatinine compared to past 90 day average, will monitor renal function         # Obesity: Estimated body mass index is 36.45 kg/m  as calculated from the following:    Height as of 5/26/23: 1.778 m (5' 10\").    Weight as of 6/16/23: 115.2 kg (254 lb).   # Moderate Malnutrition: based on nutrition assessment         Disposition Plan      Expected Discharge Date: 06/26/2023                  Jem Malloy DO  Hospitalist Service, GOLD TEAM 21  [facility]  Securely message with [employer] (more info)  Text page via [employer] Paging/Directory   See signed in provider for up to date coverage information  ______________________________________________________________________    Interval History   Pain relatively well controlled this morning  Seems run down  No other complaints at the moment  2 unit(s) prbc ordered, 2nd hanging when I go by to see him    Physical Exam   Vital Signs: Temp: 97.6  F (36.4  C) Temp src: Oral BP: 118/50 Pulse: 72   Resp: 16 SpO2: 95 % O2 Device: Nasal cannula Oxygen Delivery: 1.5 LPM  Weight: 0 lbs 0 oz    Lying down in no distress  Awake, alert  Speech clear, coherent  RR and wob normal    Medical Decision Making       60 MINUTES SPENT BY ME on the date of service doing chart review, history, exam, documentation & further activities per the note.      Data     I have personally reviewed the following data over the past 24 hrs:    17.8 (H)  \   5.3 (LL)   / 347     137 105 41.9 (H) /  169 (H)   5.4 (H) 23 2.22 (H) \       ALT: N/A AST: N/A AP: N/A TBILI: N/A   ALB: 2.2 (L) TOT PROTEIN: N/A LIPASE: N/A       Imaging results reviewed over the past 24 hrs:   No results found for " this or any previous visit (from the past 24 hour(s)).

## 2023-06-22 NOTE — OR NURSING
Patient's oxygen dropped to mid 70's. Took patient about 30 seconds to recover with face mask at 15L. [facility] notified. Pt awake and alert during desaturation, talking and able to take deep breaths.     Will continue to monitor.

## 2023-06-22 NOTE — PROGRESS NOTES
[facility]: Follow Up Note      Patient:  Waldo Bustos, Date of birth 1950, Medical record number [id number]  Date of Visit:  June 19, 2023         Assessment and Recommendations:   Problem List:  1. MRSA bacteremia, suspected secondary to left hip PJI and associated soft tissue infection   -6/14 blood cultures: 4 of 4 bottles positive  -6/15 blood cultures: 4 of 4 bottles positive  -6/17 blood cultures: 2 of 4 bottles positive  -6/19 NGTD  -6/20 NGTD  2. Chronic left hip PJI, s/p 2 stage revision with re-implantation on 5/26/23; I&D on 6/21/23 without liner exchanged  -GUERRERO drain in place growing MRSA, Corynebacterium, and Klebsiella  -6/21 intraop cultures pending   3. DM2  4. RA  5. Venous insufficiency    Recommendations:  - Continue vancomycin (pharmacy to dose) and ceftriaxone 2g Q24H   - Anticipate an initial 6 weeks of IV therapy, followed by likely transition to oral antibiotics   - Long-term antibiotic plan to be determined in outpatient ID follow up   - Weekly CBC w/diff, CMP, CRP, and vancomycin level while in IV therapy    - Okay to place PICC line      Discussion:  Pt who was followed by ID team at  (last by Johnny Mercado and Shira on [facility]), transferred to [place] for KEILA, now back to [facility] on 6/20/23.      73yo M history of chronic left hip PJI, most recently s/p revision left BELLA on 5/26, who has been in TCU since discharge on 6/2, but then developed MRSA bacteremia with confusion and somnolence. CT hip without contrast showed soft tissue stranding and multiple foci of subcutaneous emphysema about the left hip, especially anteriorly, partially visualized fluid collection within subcutaneous fat superficial-medial to the proximal adductor longus (caudal extent of which is incompletely visualized) which appears enlarged compared to outside CT 10/25/2022. Infection/abscess not excluded.  Blood cultures remained persistently positive for MRSA ( 6/14-6/17) but  have been negative since 6/19/23. KEILA negative. Presumed hip infection and they obtained cultures from drain on 6/17/2023 - showing MRSA, Klebsiella, and Corynebacterium. Patient taken to OR for DAIR with Dr. Meyers on 6/21. Intraoperative cultures collected. Ceftriaxone added to vancomycin after I&D for coverage of Klebsiella. If intraop cx without Klebsiella, could consider discontinuing ceftriaxone. Otherwise will plan for minimum 6 weeks of IV vancomycin and ceftriaxone.      ID will continue to follow.     Shelton Meredith MD  Infectious Diseases   [phone]        Interval History:   Afebrile. WBC up but had surgery yesterday. Op report without any bart infection, but cultures were collected. I did confirm with Dr. Meyers today that we should treat this as a prosthetic joint infection, which he agreed.          Current Antimicrobials     Vancomycin  Ceftriaxone         Physical Exam:   Ranges for vital signs:  Temp:  [97.2  F (36.2  C)-98.1  F (36.7  C)] 97.7  F (36.5  C)  Pulse:  [68-93] 73  Resp:  [10-18] 18  BP: ()/(35-58) 110/49  FiO2 (%):  [1.5 %] 1.5 %  SpO2:  [91 %-100 %] 96 %    Intake/Output Summary (Last 24 hours) at 6/16/2023 1115  Last data filed at 6/15/2023 2239  Gross per 24 hour   Intake 50 ml   Output --   Net 50 ml     Exam:  GENERAL: Well-developed, well-nourished. Slow to respond, but pleasant and cooperative.  ENT:  Head is normocephalic, atraumatic.   EYES:  Eyes have anicteric sclerae.    LUNGS:  Breathing comfortably  EXT: Extremities warm and without edema. Left hip with GUERRERO drain in place, serosanguinous fluid in line and collection vessel.  SKIN:  No acute rashes. Suurgical site with no obvious erythema or purulence  NEUROLOGIC:  Grossly nonfocal, though confused and somnolent         Laboratory Data:   Reviewed.  Pertinent for:    Microbiology:  Culture   Date Value Ref Range Status   06/21/2023 Culture in progress  Preliminary   06/21/2023 1+ Staphylococcus aureus (A)  Preliminary    06/21/2023 Culture in progress  Preliminary   06/21/2023 2+ Staphylococcus aureus (A)  Preliminary   06/21/2023 Culture in progress  Preliminary   06/21/2023 1+ Staphylococcus aureus (A)  Preliminary   06/21/2023 Culture in progress  Preliminary   06/21/2023 2+ Staphylococcus aureus (A)  Preliminary   06/21/2023 Culture in progress  Preliminary   06/21/2023 2+ Staphylococcus aureus (A)  Preliminary   06/20/2023 No growth after 2 days  Preliminary   06/20/2023 No growth after 2 days  Preliminary   06/19/2023 No growth after 3 days  Preliminary   06/19/2023 No growth after 3 days  Preliminary   06/17/2023 4+ Staphylococcus aureus MRSA (A)  Final   06/17/2023 2+ Corynebacterium striatum (A)  Final     Comment:     Identification obtained by MALDI-TOF mass spectrometry research use only database. Test characteristics determined and verified by the Infectious Diseases Diagnostic Laboratory.   06/17/2023 1+ Klebsiella pneumoniae (A)  Final   06/17/2023 No anaerobic organisms isolated after 4 days  Preliminary   06/17/2023 Positive on the 1st day of incubation (A)  Final   06/17/2023 Staphylococcus aureus MRSA (AA)  Final     Comment:     1 of 2 bottlesSusceptibilities done on previous cultures   06/17/2023 Positive on the 1st day of incubation (A)  Final   06/17/2023 Staphylococcus aureus MRSA (AA)  Final     Comment:     1 of 2 bottlesSusceptibilities done on previous cultures   06/17/2023 <10,000 CFU/mL Urogenital marimar  Final   06/16/2023 Positive on the 1st day of incubation (A)  Final   06/16/2023 Staphylococcus aureus MRSA (AA)  Final     Comment:     1 of 2 bottlesSusceptibilities done on previous cultures   06/16/2023 Positive on the 1st day of incubation (A)  Final   06/16/2023 Staphylococcus aureus MRSA (AA)  Final     Comment:     1 of 2 bottlesSusceptibilities done on previous cultures   06/15/2023 Positive on the 1st day of incubation (A)  Final   06/15/2023 Staphylococcus aureus MRSA (AA)  Final      Comment:     2 of 2 bottlesSusceptibilities done on previous cultures   06/15/2023 Positive on the 1st day of incubation (A)  Final   06/15/2023 Staphylococcus aureus MRSA (AA)  Final     Comment:     2 of 2 bottlesSusceptibilities done on previous cultures   06/14/2023 Positive on the 1st day of incubation (A)  Final   06/14/2023 Staphylococcus aureus MRSA (AA)  Final     Comment:     2 of 2 bottlesSusceptibilities done on previous cultures   06/14/2023 Positive on the 1st day of incubation (A)  Final   06/14/2023 Staphylococcus aureus MRSA (AA)  Final     Comment:     2 of 2 bottles     05/26/2023 No anaerobic organisms isolated  Final   05/26/2023 No anaerobic organisms isolated  Final   05/26/2023 No anaerobic organisms isolated  Final   05/26/2023 No anaerobic organisms isolated  Final   05/26/2023 No Growth  Final   05/26/2023 No Growth  Final   05/26/2023 No Growth  Final   05/26/2023 No Growth  Final   05/26/2023 No anaerobic organisms isolated  Final   05/26/2023 No Growth  Final   04/07/2023 No anaerobic organisms isolated  Final   04/07/2023 No Growth  Final   04/07/2023 No anaerobic organisms isolated  Final   04/07/2023 No Growth  Final   04/07/2023 No anaerobic organisms isolated  Final   04/07/2023 No Growth  Final   04/07/2023 No anaerobic organisms isolated  Final   04/07/2023 No Growth  Final   04/07/2023 No anaerobic organisms isolated  Final   04/07/2023 No Growth  Final   04/07/2023 No anaerobic organisms isolated  Final   04/07/2023 No Growth  Final   11/22/2022 4+ Finegoldia magna (A)  Final     Comment:     Susceptibilities done on previous cultures   11/22/2022 No Growth  Final   11/22/2022 1+ Proteus mirabilis (A)  Final     Comment:     Susceptibilities done on previous cultures   11/22/2022 1+ Finegoldia magna (A)  Final     Comment:     Susceptibilities done on previous cultures   11/22/2022 No Growth  Final   11/22/2022 1+ Proteus mirabilis (A)  Final   11/22/2022 No anaerobic organisms  isolated  Final   11/22/2022 No anaerobic organisms isolated  Final   11/22/2022 No anaerobic organisms isolated  Final   11/22/2022 No Growth  Final   11/22/2022 No Growth  Final   11/22/2022 No Growth  Final   11/22/2022 Isolated in broth only Proteus mirabilis (A)  Final     Comment:     On day 1 of incubationSusceptibilities done on previous cultures   11/22/2022 Isolated in broth only Proteus mirabilis (A)  Final     Comment:     On day 1 of incubationSusceptibilities done on previous cultures   11/22/2022 1+ Proteus mirabilis (A)  Final   11/22/2022 No anaerobic organisms isolated  Final   11/22/2022 No Growth  Final   11/22/2022 1+ Proteus mirabilis (A)  Final     Comment:     Susceptibilities done on previous cultures   11/14/2022 4+ Porphyromonas species (A)  Final     Comment:     Beta Lactamase Positive  Identification obtained by MALDI-TOF mass spectrometry research use only database. Test characteristics determined and verified by the Infectious Diseases Diagnostic Laboratory.   11/14/2022 1+ Finegoldia magna (A)  Corrected   11/14/2022 3+ Proteus mirabilis (A)  Final   11/14/2022 No Growth  Final     Metabolic Studies       Recent Labs   Lab Test 06/22/23  1341 06/22/23  1205 06/22/23  0806 06/22/23  0711 06/22/23  0241 06/21/23  2140 06/20/23  0758 06/20/23  0704 06/19/23  1145 06/19/23  0915 06/19/23  0622 06/18/23  1106 06/18/23  0728 06/17/23  1122 06/17/23  0853 06/16/23  0820 06/16/23  0605 03/21/23  0810 03/20/23  1919 01/10/23  1710 01/10/23  1105     --   --  137  --   --   --  137  --  139  --   --  138  --   --   --  141   < >  --    < >  --    POTASSIUM 5.1  --   --  5.4*  --   --   --  4.0  --  4.2 4.0  --  4.1  --   --   --  4.2   < >  --    < >  --    CHLORIDE 105  --   --  105  --   --   --  104  --  105  --   --  104  --   --   --  104   < >  --    < >  --    CO2 23  --   --  23  --   --   --  25  --  22  --   --  24  --   --   --  24   < >  --    < >  --    ANIONGAP 10  --   --   9  --   --   --  8  --  12  --   --  10  --   --   --  13   < >  --    < >  --    BUN 45.1*  --   --  41.9*  --   --   --  28.6*  --  26.6*  --   --  28.9*  --   --   --  27.7*   < >  --    < >  --    CR 2.43*  --   --  2.22*  --   --   --  1.21*  --  1.07  --   --  1.05  --  1.19*  --  1.14   < >  --    < >  --    GFRESTIMATED 28*  --   --  31*  --   --   --  64  --  74  --   --  76  --  65  --  69   < >  --    < >  --    * 169* 160* 189* 203* 237*   < > 141*   < > 179*  --    < > 189*   < >  --    < > 171*   < >  --    < >  --    A1C  --   --   --   --   --   --   --   --   --   --   --   --   --   --   --   --  5.6  --   --    < >  --    MAIKOL 7.3*  --   --  7.1*  --   --   --  8.0*  --  8.1*  --   --  8.4*  --   --   --  8.3*   < >  --    < >  --    PHOS 5.1*  --   --  5.1*  --   --   --   --   --   --   --   --   --   --   --   --   --   --   --   --   --    MAG  --   --   --  1.6*  --   --   --  1.7  --   --   --   --  1.8  --  1.9  --  1.7   < >  --   --   --    LACT  --   --   --   --   --   --   --   --   --   --   --   --   --   --   --   --   --   --  1.3  --  0.9    < > = values in this interval not displayed.     Hepatic Studies    Recent Labs   Lab Test 06/22/23  1341 06/22/23  0711 06/20/23  0704 06/19/23  0915 06/16/23  0605 06/14/23  0651 05/31/23  0630 05/26/23  0753   BILITOTAL  --   --  0.2 0.2 0.2 0.2 0.2 0.3   ALKPHOS  --   --  93 94 114 110 74 92   ALBUMIN 2.4* 2.2* 2.3* 2.6* 2.8* 2.8* 3.2* 3.7   AST  --   --  14 19 16 16 14 10   ALT  --   --  14 12 10 8 6* 6*     Hematology Studies      Recent Labs   Lab Test 06/22/23  1341 06/22/23  0711 06/21/23  0800 06/20/23  0704 06/19/23  0915 06/18/23  0728   WBC 18.1* 17.8* 10.1 8.9 10.1 11.1*   HGB 6.7* 5.3* 7.3* 7.3* 7.1* 7.3*   HCT 21.4* 17.6* 24.1* 25.0* 24.0* 24.7*    347 354 375 402 417     Arterial Blood Gas Testing    Recent Labs   Lab Test 06/15/23  1325 11/26/22  0945 11/22/22  1925 11/22/22  1644   PH 7.39  --   --   --    PCO2  44  --   --   --    PO2 83  --   --   --    HCO3 26  --   --   --    O2PER 2 32 40.0 47.0          Latest Ref Rng & Units 6/22/2023     1:41 PM 6/22/2023     7:11 AM 6/21/2023     8:00 AM 6/20/2023     7:04 AM 6/19/2023     9:15 AM   Transplant Immunosuppression Labs   Creat 0.67 - 1.17 mg/dL 2.43  2.22   1.21  1.07    Urea Nitrogen 8.0 - 23.0 mg/dL 45.1  41.9   28.6  26.6    WBC 4.0 - 11.0 10e3/uL 18.1  17.8  10.1  8.9  10.1    Neutrophil %     65           Imaging:   XR Chest Port 1 View  Result Date: 6/14/2023  IMPRESSION: Negative portable view. If further detail is needed comment consider upright PA and lateral examination when clinical condition permits. TAE MORRIS MD   SYSTEM ID:  [id number]    XR Abdomen 1 View  Result Date: 6/10/2023  IMPRESSION: Nonobstructive bowel gas pattern. Large stool burden. I have personally reviewed the examination and initial interpretation and I agree with the findings. JONNATHAN COLEMAN MD   SYSTEM ID:  [id number]

## 2023-06-22 NOTE — OR NURSING
Pt refusing to let RN turn patient to assess skin. Educated patient on importance of checking skin and risk of infection. Pt verbalized understanding. Patient verbalized may be willing to turn after pain medications start working. RN on floor updated.

## 2023-06-22 NOTE — PLAN OF CARE
"  VS: Pt denies chest pain or SOB.    O2: 1.5L via nasal cannula overnight.    Output: Minimal output to urinal overnight. Bladder scanned x2 overnight for <200mL. Fluids encouraged.    Last BM: 6/21/23   Activity: Ax2-3 with lift device. Pt refuses repositioning and cares. When asked about repositioning Pt states \"No, you will not be touching my left leg at all. I'm fine as I am.\" Pt educated on importance of repositioning to prevent skin breakdown.    Up for meals? N/A   Skin: L hip incision. Scattered bruising to BUE. Pt refused full skin assessment.   Pain: Managed with PRN Tylenol and Tramadol. Pt refuses Ice packs.    CMS: LLE numbness due to foot drop per pt report. A&O x4   Dressing: L hip dressing - CDI   Diet: Regular   LDA: PIV x2 to L forearm.    Equipment: IV pole/pump and pt belongings.    Plan: TBD   Additional Info: [id number]         "

## 2023-06-22 NOTE — ANESTHESIA POSTPROCEDURE EVALUATION
Patient: Waldo Bustos    Procedure: Procedure(s):  IRRIGATION AND DEBRIDEMENT LEFT HIP,       Anesthesia Type:  General    Note:  Disposition: Inpatient   Postop Pain Control: Uneventful            Sign Out: Well controlled pain   PONV: No   Neuro/Psych: Uneventful            Sign Out: Acceptable/Baseline neuro status   Airway/Respiratory: Uneventful            Sign Out: Acceptable/Baseline resp. status   CV/Hemodynamics: Uneventful            Sign Out: Acceptable CV status; No obvious hypovolemia; No obvious fluid overload   Other NRE: NONE   DID A NON-ROUTINE EVENT OCCUR? No           Last vitals:  Vitals Value Taken Time   /56 06/21/23 1945   Temp 36.2  C (97.2  F) 06/21/23 1945   Pulse 81 06/21/23 1945   Resp 10 06/21/23 1945   SpO2 97 % 06/21/23 1946   Vitals shown include unvalidated device data.    Electronically Signed By: Rhys Porter MD  June 21, 2023  9:53 PM

## 2023-06-23 PROBLEM — N17.9 ACUTE KIDNEY FAILURE, UNSPECIFIED (H): Status: ACTIVE | Noted: 2023-01-01

## 2023-06-23 PROBLEM — N17.0 ACUTE KIDNEY FAILURE WITH TUBULAR NECROSIS (H): Status: ACTIVE | Noted: 2023-01-01

## 2023-06-23 PROBLEM — N17.8 OTHER ACUTE KIDNEY FAILURE (H): Status: ACTIVE | Noted: 2023-01-01

## 2023-06-23 NOTE — PLAN OF CARE
VS: Vital signs:  Temp: 96.9  F (36.1  C) Temp src: Oral BP: 113/52 Pulse: 70   Resp: 16 SpO2: 96 % O2 Device: Nasal cannula Oxygen Delivery: 3 LPM       O2: 96 % 2 L of 02.    Output: Voids spontaneously via urinal    Last BM: 6/21/2023.   Activity: Bedfast    Up for meals? Yes    Skin: Refused skin assessment. Visible skin intact    Pain: Pain managed with prn and scheduled Robaxin.    Neuro / CMS: Baseline numbness and tingling    Dressing: L hip incision dressed changed today by ortho.   Diet: Regular diet    LDA: 2 PIV. L forarm IV running Bolus @200ml/hr    Plan: Continue with plan of care    Additional Info:

## 2023-06-23 NOTE — PLAN OF CARE
Goal Outcome Evaluation:      Plan of Care Reviewed With: patient    Overall Patient Progress: improving    Outcome Evaluation: Patient continues with inadequate intakes. Explained need for non-select meals however pt able to change these as needed. Pt agreeable to continued supplements and multivitamin

## 2023-06-23 NOTE — PLAN OF CARE
Temp: (!) 95.3  F (35.2  C) Temp src: Oral BP: 120/49 Pulse: 66   Resp: 18 SpO2: 98 % O2 Device: Nasal cannula Oxygen Delivery: 3 LPM     [id number]   AOx4. N/T to LLE. Denies CP, SOB  Voiding with minimal output.  mL. Urinal use and fluids encouraged. LBM 6/21  Pt c/o LLE pain; managed with tramadol  Dressing to LLE CDI ex bleeding noted at drain site this AM. Dressing applied and ortho made aware  Pt refuses full skin assess and repositioning. Provided education on importance of wound healing  L forearm PIV infusing TKO. L hand PIV SL  Call light within reach at all times. Pt able to make needs known. Continue with POC.

## 2023-06-23 NOTE — PHARMACY-VANCOMYCIN DOSING SERVICE
Pharmacy Vancomycin Note  Date of Service 2023  Patient's  1950   72 year old, male    Indication: MRSA Bacteremia, vanco MARCOS</=0.5  Day of Therapy: Since 6/15  Current vancomycin regimen:  1000 mg mg IV q24h  Current vancomycin monitoring method: AUC  Current vancomycin therapeutic monitoring goal: 400-600 mg*h/L    Insight[employer] Prediction of Current Vancomycin Regimen  Loading dose: N/A  Regimen: 1000 mg IV every 24 hours.  Start time: 13:34 on 2023  Exposure target: AUC24 (range)400-600 mg/L.hr   AUC24,ss: 855 mg/L.hr  Probability of AUC24 > 400: 100 %  Ctrough,ss: 31.8 mg/L  Probability of Ctrough,ss > 20: 100 %  Probability of nephrotoxicity (Lodise HAJA ): 47 %      Current estimated CrCl = Estimated Creatinine Clearance: 37.1 mL/min (A) (based on SCr of 2.32 mg/dL (H)).    Creatinine for last 3 days  2023:  7:11 AM Creatinine 2.22 mg/dL;  1:41 PM Creatinine 2.43 mg/dL  2023:  8:40 AM Creatinine 2.32 mg/dL    Recent Vancomycin Levels (past 3 days)  2023:  8:00 AM Vancomycin 28.9 ug/mL  2023:  8:40 AM Vancomycin 31.4 ug/mL    Vancomycin IV Administrations (past 72 hours)                   vancomycin (VANCOCIN) 1000 mg in dextrose 5% 200 mL PREMIX (mg) 1,000 mg New Bag 23 1334     1,000 mg New Bag 23 1325                Nephrotoxins and other renal medications (From now, onward)    Start     Dose/Rate Route Frequency Ordered Stop    23 1500  vancomycin (VANCOCIN) 500 mg vial to attach to  mL bag         500 mg  over 1 Hours Intravenous EVERY 24 HOURS 23 1102      23 1400  sodium chloride 0.9 % 18.4 mL with ropivacaine (NAROPIN) 5 MG/ mg, EPINEPHrine (ADRENALIN) 600 mcg, ketorolac (TORADOL) 30 mg          INTRA-ARTICULAR ONCE 23 1354               Contrast Orders - past 72 hours (72h ago, onward)    None          Interpretation of levels and current regimen:  Vancomycin level is reflective of AUC greater than 600    Has  serum creatinine changed greater than 50% in last 72 hours: Yes    Urine output:  unable to determine    Renal Function: Worsening    InsightRX Prediction of Planned New Vancomycin Regimen  Loading dose: N/A  Regimen: 500 mg IV every 24 hours.  Start time: 13:34 on 06/23/2023  Exposure target: AUC24 (range)400-600 mg/L.hr   AUC24,ss: 457 mg/L.hr  Probability of AUC24 > 400: 86 %  Ctrough,ss: 17 mg/L  Probability of Ctrough,ss > 20: 13 %  Probability of nephrotoxicity (Lodise HAJA 2009): 13 %      Plan:  1. Decrease Dose to 500 mg IV every 24 hours. Will adjust timing of next dose after a couple hours due to high level and HALEY. Will plan to get another level tomorrow pending creatinine and kidney function.  2. Vancomycin monitoring method: AUC  3. Vancomycin therapeutic monitoring goal: 400-600 mg*h/L  4. Pharmacy will check vancomycin levels as appropriate in 1-3 Days.  5. Serum creatinine levels will be ordered daily for the first week of therapy and at least twice weekly for subsequent weeks.    Samantha Lazo, [facility]

## 2023-06-23 NOTE — CONSULTS
Nephrology Initial Consult  June 23, 2023      Waldo Bustos MRN:[id number] YOB: 1950  Date of Admission:6/17/2023  Primary care provider: Jv Felix  Requesting physician: Jem Malloy DO    ASSESSMENT AND RECOMMENDATIONS:     #HALEY likely prerenal in setting of inflammation and sepsis, though vancomycin could be contributing factor.   - baseline Scr 0.9-1.1   - creatinine has trended up since 6/20 to 2.43 yesterday, 2.32 today.   - UOP 25 ml yesterday with 134 noted on bladder scan  - UOP 50 ml since midnight  - Urine sodium < 20, urine osmolality 235  - he is adopted, family history unknown  - check renal US  - will check urine albumin and urine protein, complements, and repeat FENA  - continue bladder scans q shift.   - avoid nephrotoxins  - please avoid PICC line, med line preferred    #Electrolytes  - hyperkalemia noted yesterday with K 5.4, improved today 4.7  - Na 137, bicarb 22  - stable, will follow    #Bacteremia with BC's + for staph and pseudomonas    #Antimicrobials  - Vancomycin, vanc level 28.9 on 6/21, 31.4 today, now discontinued  - ceftriaxone given 6/21 and 6/22, discontinued  - cefepime started today    #Anemia  - Hgb 7.5, up from 5.3 yesterday after transfusion  - acute management per primary    #Bone/Mineral  - Corrected calcium 8.16, phos 4.7  - will monitor for now     Recommendations were communicated to primary team via this note.    Seen and discussed with Dr. 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  Postop course complicated by profound deconditioning.  He was admitted to TCU initially on 12/23/2022 for physical rehabilitation, however therapies no longer worked with patient after several months of an in bed therapy and his refusals to mobilize.  Underwent biopsy on 4/7/2023 without evidence of infection.  He was transferred back to [facility] for left total hip arthroplasty and reimplantation on 5/26/23 with Dr. Meyers.  Presented again to [facility] for persistent MRSA bacteremia on 6/17. He initially started vancomycin. Was switched to ceftriaxone and now to cefepime.     Nephrology was consulted for HALEY management. His baseline creatinine has been ~ 0.9 -1.1 for the past year with occasional increase to 1.3 to 1.4  He has had diabetes for several years with poor control in the past. More recently his HgbA1c's have been less than 6. He previously used NSAIDS though stopped about a year ago. He is adopted and does not know family history. He has no fevers/chills. He has a good appetite, no n/v/d. He has some pain in his left hip and left leg though feels the leg pain is improving. He has no difficulty with urination, no dysuria or hematuria.     PAST MEDICAL HISTORY:    Past Medical History:   Diagnosis Date     Arthritis 5 years ago     Chronic osteoarthritis Not sure     Diabetes (H) 10-12 years ago     DVT (deep venous thrombosis) (H)      Dyslipidemia      Gastroesophageal reflux disease      History of blood transfusion      Iliopsoas abscess (H)      Infection of prosthetic hip joint (H)      JAIR (obstructive sleep apnea)      Pulmonary embolism (H)      RA (rheumatoid arthritis) (H) Not sure     Reduced vision 2-3 years ago    Cataract Surgery on both eyes     Venous insufficiency        Past Surgical History:   Procedure Laterality Date     ARTHROPLASTY REVISION HIP Left 5/26/2023    Procedure: Revision Left Total Hip Arthroplasty;   Surgeon: Rom Meyers MD;  [facility]: [facility]     ARTHROPLASTY REVISION HIP Left 6/21/2023    Procedure: IRRIGATION AND DEBRIDEMENT LEFT HIP,;  Surgeon: Rom Meyers MD;  Location: [facility] OR     ASPIRATION NEEDLE HIP Left 4/7/2023    Procedure: ARTHROCENTESIS, LEFT HIP;  Surgeon: Rom Meyers MD;  Location: [facility] OR     Cataract       COLONOSCOPY       EGD       IR FINE NEEDLE ASPIRATION W ULTRASOUND  3/6/2023     IR IVC FILTER PLACEMENT      removed     IRRIGATION AND DEBRIDEMENT HIP, PLACE ANTIBIOTIC CEMENT BEADS / SPACER Left 11/22/2022    Procedure: and Reconstruction Using G 20 Prosthetic Antibiotic Cement Spacer;  Surgeon: Rom Meyers MD;  Location: [facility] OR     REMOVE ANTIBIOTIC CEMENT BEADS / SPACER HIP Left 5/26/2023    Procedure: Explantation of Left Hip Antibiotic Spacer;  Surgeon: Rom Meyers MD;  [facility] OR     REMOVE HARDWARE ARTHROPLASTY HIP Left 11/22/2022    Procedure: Explantation of Chronic  Infected Left Total Hip Arthroplasty, Extended Trochanteric Osteotomy with Extensive Debridement;  Surgeon: Rom Meyers MD;  [facility] OR     SYNOVIAL BIOPSY HIP Left 4/7/2023    Procedure: BIOPSY, SYNOVIUM, LEFT  HIP, percutaneous;  Surgeon: Rom Meyers MD;  Location: [facility] OR     [facility] PELVIS/HIP JOINT SURGERY UNLISTED       [facility] STOMACH SURGERY PROCEDURE UNLISTED  1954    2 Hernia surgeries as a child        MEDICATIONS:  PTA Meds  Prior to Admission medications    Medication Sig Last Dose Taking? Auth Provider Long Term End Date   acetaminophen (TYLENOL) 325 MG tablet Take 2 tablets (650 mg) by mouth every 4 hours as needed for other (For optimal non-opioid multimodal pain management to improve pain control.) 6/19/2023 at 1139 Yes Jessica Hodge APRN CNS No    apixaban ANTICOAGULANT (ELIQUIS) 5 MG tablet Take 5 mg by mouth 2 times daily 6/19/2023 at 0804 Yes Unknown, Entered By History No    atorvastatin (LIPITOR) 40 MG tablet Take 40 mg by mouth daily 6/19/2023 at 0805 Yes  Reported, Patient Yes    docusate sodium (COLACE) 100 MG capsule Take 100 mg by mouth 2 times daily Unknown Yes Unknown, Entered By History     lactobacillus rhamnosus, GG, (CULTURELL) capsule Take 1 capsule by mouth 2 times daily 6/19/2023 at 0804 Yes Maris Foster MD     magnesium oxide 400 MG CAPS Take 1 capsule by mouth daily 6/17/2023 at 0919 Yes Unknown, Entered By History     methocarbamol (ROBAXIN) 500 MG tablet Take 1 tablet (500 mg) by mouth every 6 hours as needed for muscle spasms 6/19/2023 at 1137 Yes Jessica Hodge APRN CNS No    oxyCODONE (ROXICODONE) 5 MG tablet Take 1 tablet (5 mg) by mouth every 4 hours as needed for moderate pain 6/15/2023 at 0847 Yes Jessica Hodge APRN CNS No    polyethylene glycol (MIRALAX) 17 g packet Take 17 g by mouth daily 6/16/2023 at 0906 Yes Jessica Hodge APRN CNS     senna-docusate (SENOKOT-S/PERICOLACE) 8.6-50 MG tablet Take 1 tablet by mouth 2 times daily 6/19/2023 at 0804 Yes Jessica Hodge APRN CNS No    traMADol (ULTRAM) 50 MG tablet Take 25-50 mg by mouth every 6 hours as needed for severe pain 6/19/2023 at 1137 Yes Unknown, Entered By History No    insulin aspart (NOVOLOG PEN) 100 UNIT/ML pen Inject 1-7 Units Subcutaneous 3 times daily (before meals)   Jem Smith DO Yes    insulin aspart (NOVOLOG PEN) 100 UNIT/ML pen Inject 1-5 Units Subcutaneous At Bedtime   Jem Smith, DO Yes       Current Meds    apixaban ANTICOAGULANT  5 mg Oral BID     atorvastatin  40 mg Oral Daily     cefTRIAXone  2 g Intravenous Q24H     insulin aspart  1-7 Units Subcutaneous TID AC     insulin aspart  1-5 Units Subcutaneous At Bedtime     lactobacillus rhamnosus (GG)  1 capsule Oral BID     methocarbamol  500 mg Oral 4x Daily     miconazole   Topical BID     polyethylene glycol  17 g Oral Daily     senna-docusate  1 tablet Oral BID     sodium chloride 0.9 % 18.4 mL with ropivacaine (NAROPIN) 5 MG/ mg, EPINEPHrine (ADRENALIN) 600 mcg,  ketorolac (TORADOL) 30 mg   INTRA-ARTICULAR Once     vancomycin  1,000 mg Intravenous Q24H     Infusion Meds      ALLERGIES:    Allergies   Allergen Reactions     Sulfa Antibiotics      Other reaction(s): *Unknown - Childhood Rxn     Tizanidine Other (See Comments)     Frequent urination; causes drowsiness, and dry mouth         REVIEW OF SYSTEMS:  A comprehensive of systems was negative except as noted above.    SOCIAL HISTORY:   Social History     Socioeconomic History     Marital status:      Spouse name: Not on file     Number of children: Not on file     Years of education: Not on file     Highest education level: Not on file   Occupational History     Not on file   Tobacco Use     Smoking status: Never     Smokeless tobacco: Never   Substance and Sexual Activity     Alcohol use: No     Drug use: No     Sexual activity: Never   Other Topics Concern     Not on file   Social History Narrative     Not on file     Social Determinants of Health     Financial Resource Strain: Not on file   Food Insecurity: Not on file   Transportation Needs: Not on file   Physical Activity: Not on file   Stress: Not on file   Social Connections: Not on file   Intimate Partner Violence: Not on file   Housing Stability: Not on file       FAMILY MEDICAL HISTORY:   Family History   Adopted: Yes   Problem Relation Age of Onset     Diabetes No family hx of      Rheumatoid Arthritis No family hx of      Low Back Problems No family hx of      Unknown/Adopted No family hx of          PHYSICAL EXAM:   Temp  Av.3  F (36.3  C)  Min: 95.3  F (35.2  C)  Max: 98.6  F (37  C)      Pulse  Av.6  Min: 62  Max: 98 Resp  Avg: 15.1  Min: 7  Max: 20  FiO2 (%)  Av %  Min: 100 %  Max: 100 %  SpO2  Av.5 %  Min: 76 %  Max: 100 %       /56 (BP Location: Right arm)   Pulse 65   Temp (!) 95.6  F (35.3  C) (Oral)   Resp 16   SpO2 95%       Admit       GENERAL APPEARANCE:  distress,  awake  EYES: no scleral icterus, pupils  equal  Pulmonary: lungs clear to auscultation with equal breath sounds bilaterally, no clubbing  CV: regular rhythm, normal rate, no rub   - Edema trace nonpitting LE edema  GI: soft, nontender, normal bowel sounds  MS: no evidence of inflammation in joints, no muscle tenderness  : no billy  SKIN: no rash, warm, dry, no cyanosis  NEURO: face symmetric     LABS:   I have reviewed the following labs:  CMP  Recent Labs   Lab 06/23/23  0916 06/23/23  0840 06/23/23  0155 06/22/23  2120 06/22/23  1341 06/22/23  0806 06/22/23  0711 06/20/23  0758 06/20/23  0704 06/19/23  1145 06/19/23  0915 06/18/23  1106 06/18/23  0728   NA  --  137  --   --  138  --  137  --  137  --  139  --  138   POTASSIUM  --  4.7  --   --  5.1  --  5.4*  --  4.0  --  4.2   < > 4.1   CHLORIDE  --  105  --   --  105  --  105  --  104  --  105  --  104   CO2  --  22  --   --  23  --  23  --  25  --  22  --  24   ANIONGAP  --  10  --   --  10  --  9  --  8  --  12  --  10   * 132* 161* 207* 155*   < > 189*   < > 141*   < > 179*   < > 189*   BUN  --  50.2*  --   --  45.1*  --  41.9*  --  28.6*  --  26.6*  --  28.9*   CR  --  2.32*  --   --  2.43*  --  2.22*  --  1.21*  --  1.07  --  1.05   GFRESTIMATED  --  29*  --   --  28*  --  31*  --  64  --  74  --  76   MAIKOL  --  7.2*  --   --  7.3*  --  7.1*  --  8.0*  --  8.1*  --  8.4*   MAG  --  1.9  --   --   --   --  1.6*  --  1.7  --   --   --  1.8   PHOS  --  4.7*  --   --  5.1*  --  5.1*  --   --   --   --   --   --    PROTTOTAL  --   --   --   --   --   --   --   --  5.9*  --  6.2*  --   --    ALBUMIN  --  2.4*  --   --  2.4*  --  2.2*  --  2.3*  --  2.6*   < >  --    BILITOTAL  --   --   --   --   --   --   --   --  0.2  --  0.2  --   --    ALKPHOS  --   --   --   --   --   --   --   --  93  --  94  --   --    AST  --   --   --   --   --   --   --   --  14 -- 19  --   --    ALT  --   --   --   --   --   --   --   --  14  --  12  --   --     < > = values in this interval not displayed.      CBC  Recent Labs   Lab 06/23/23  0840 06/22/23  1926 06/22/23  1341 06/22/23  0711 06/21/23  0800   HGB 7.5* 7.5* 6.7* 5.3* 7.3*   WBC 14.0*  --  18.1* 17.8* 10.1   RBC 2.49*  --  2.26* 1.82* 2.46*   HCT 23.6*  --  21.4* 17.6* 24.1*   MCV 95  --  95 97 98   MCH 30.1  --  29.6 29.1 29.7   MCHC 31.8  --  31.3* 30.1* 30.3*   RDW 15.0  --  14.1 14.3 14.3     --  367 347 354     INRNo lab results found in last 7 days.  ABGNo lab results found in last 7 days.   URINE STUDIES  Recent Labs   Lab Test 06/22/23  1457 06/17/23  0817   COLOR Yellow Yellow   APPEARANCE Slightly Cloudy* Slightly Cloudy*   URINEGLC Negative Negative   URINEBILI Negative Negative   URINEKETONE Negative Negative   SG 1.020 1.023   UBLD Negative Negative   URINEPH 5.0 5.0   PROTEIN 10* 50*   NITRITE Negative Negative   LEUKEST Large* Moderate*   RBCU 18* 2   WBCU 66* 14*     No lab results found.  PTH  No lab results found.  IRON STUDIES  No lab results found.    IMAGING: Reviewed    OPAL MAYER PA-C

## 2023-06-23 NOTE — PROGRESS NOTES
General ID Service [facility]: Follow Up Note      Patient:  Waldo Bustos, Date of birth 1950, Medical record number [id number]  Date of Visit:  June 19, 2023         Assessment and Recommendations:   Problem List:  1. MRSA bacteremia, suspected secondary to left hip PJI and associated soft tissue infection   -6/14 blood cultures: 4 of 4 bottles positive  -6/15 blood cultures: 4 of 4 bottles positive  -6/17 blood cultures: 2 of 4 bottles positive  -6/19 NGTD  -6/20 NGTD  2. Chronic left hip PJI, s/p 2 stage revision with re-implantation on 5/26/23; I&D on 6/21/23 without liner exchanged  -GUERRERO drain in place growing MRSA, Corynebacterium, and Klebsiella  -6/21 intraop cultures: MRSA and Pseudomonas  3. DM2  4. RA  5. Venous insufficiency    Recommendations:  - Continue vancomycin (pharmacy to dose; change ceftriaxone to cefepime 2g Q8H (target regimen, may need to adjust for HALEY)   - Anticipate an initial 6 weeks of IV therapy, followed by likely transition to oral antibiotics   - Long-term antibiotic plan to be determined in outpatient ID follow up   - Weekly CBC w/diff, CMP, CRP, and vancomycin level while in IV therapy    - Okay to place PICC line      Discussion:  Pt who was followed by ID team at  (last by Johnny Mercado and Shira on [facility]), transferred to [place] for KEILA, now back to [facility] on 6/20/23.      73yo M history of chronic left hip PJI, most recently s/p revision left BELLA on 5/26, who has been in TCU since discharge on 6/2, but then developed MRSA bacteremia with confusion and somnolence. CT hip without contrast showed soft tissue stranding and multiple foci of subcutaneous emphysema about the left hip, especially anteriorly, partially visualized fluid collection within subcutaneous fat superficial-medial to the proximal adductor longus (caudal extent of which is incompletely visualized) which appears enlarged compared to outside CT 10/25/2022. Infection/abscess not excluded.   Blood cultures remained persistently positive for MRSA ( 6/14-6/17) but have been negative since 6/19/23. KEILA negative. Presumed hip infection and they obtained cultures from drain on 6/17/2023 - showing MRSA, Klebsiella, and Corynebacterium. Patient taken to OR for DAIR with Dr. Meyers on 6/21. OR report without any overt infection, but I did confirm with Dr. Meyers today that we should treat this as a prosthetic joint infection, which he agreed. Intraoperative cultures collected. Ceftriaxone added to vancomycin after I&D for coverage of Klebsiella. If intraop cx without Klebsiella, could consider discontinuing ceftriaxone. Intraop cultures now showing Pseudomonas.        ID will continue to follow.     Shelton Meredith MD  Infectious Diseases   [phone]        Interval History:   Afebrile. Hemoglobin low and received 2u RBC yesterday. Patient appears to be feeling a little better. Intraop cx showing Staph and Pseudomonas.           Current Antimicrobials     Vancomycin  Ceftriaxone         Physical Exam:   Ranges for vital signs:  Temp:  [95.3  F (35.2  C)-97.7  F (36.5  C)] 95.6  F (35.3  C)  Pulse:  [65-75] 65  Resp:  [16-18] 16  BP: (108-124)/(41-56) 124/56  SpO2:  [95 %-99 %] 95 %    Intake/Output Summary (Last 24 hours) at 6/16/2023 1115  Last data filed at 6/15/2023 2239  Gross per 24 hour   Intake 50 ml   Output --   Net 50 ml     Exam:  GENERAL: Well-developed, well-nourished. Slow to respond, but pleasant and cooperative.  ENT:  Head is normocephalic, atraumatic.   EYES:  Eyes have anicteric sclerae.    LUNGS:  Breathing comfortably  EXT: Extremities warm and without edema. Left hip with GUERRERO drain in place, serosanguinous fluid in line and collection vessel.  SKIN:  No acute rashes. Surgical site with no obvious erythema or purulence  NEUROLOGIC:  Grossly nonfocal, though confused and somnolent         Laboratory Data:   Reviewed.  Pertinent for:    Microbiology:  Culture   Date Value Ref Range Status    06/21/2023 No anaerobic organisms isolated after 1 day  Preliminary   06/21/2023 No anaerobic organisms isolated after 1 day  Preliminary   06/21/2023 No anaerobic organisms isolated after 1 day  Preliminary   06/21/2023 No anaerobic organisms isolated after 1 day  Preliminary   06/21/2023 Culture in progress  Preliminary   06/21/2023 1+ Staphylococcus aureus (A)  Preliminary     Comment:     Susceptibilities done on previous cultures   06/21/2023 1+ Pseudomonas aeruginosa (A)  Preliminary   06/21/2023 Culture in progress  Preliminary   06/21/2023 2+ Staphylococcus aureus (A)  Preliminary     Comment:     Susceptibilities done on previous cultures   06/21/2023 1+ Pseudomonas aeruginosa (A)  Preliminary   06/21/2023 1+ Staphylococcus aureus (A)  Final     Comment:     Susceptibilities done on previous cultures   06/21/2023 2+ Staphylococcus aureus (A)  Final     Comment:     Susceptibilities done on previous cultures   06/21/2023 No anaerobic organisms isolated after 1 day  Preliminary   06/21/2023 2+ Staphylococcus aureus (A)  Preliminary   06/20/2023 No growth after 3 days  Preliminary   06/20/2023 No growth after 3 days  Preliminary   06/19/2023 No growth after 4 days  Preliminary   06/19/2023 No growth after 4 days  Preliminary   06/17/2023 4+ Staphylococcus aureus MRSA (A)  Final   06/17/2023 2+ Corynebacterium striatum (A)  Final     Comment:     Identification obtained by MALDI-TOF mass spectrometry research use only database. Test characteristics determined and verified by the Infectious Diseases Diagnostic Laboratory.   06/17/2023 1+ Klebsiella pneumoniae (A)  Final   06/17/2023 No anaerobic organisms isolated after 5 days  Preliminary   06/17/2023 Positive on the 1st day of incubation (A)  Final   06/17/2023 Staphylococcus aureus MRSA (AA)  Final     Comment:     1 of 2 bottlesSusceptibilities done on previous cultures   06/17/2023 Positive on the 1st day of incubation (A)  Final   06/17/2023  Staphylococcus aureus MRSA (AA)  Final     Comment:     1 of 2 bottlesSusceptibilities done on previous cultures   06/17/2023 <10,000 CFU/mL Urogenital marimar  Final   06/16/2023 Positive on the 1st day of incubation (A)  Final   06/16/2023 Staphylococcus aureus MRSA (AA)  Final     Comment:     1 of 2 bottlesSusceptibilities done on previous cultures   06/16/2023 Positive on the 1st day of incubation (A)  Final   06/16/2023 Staphylococcus aureus MRSA (AA)  Final     Comment:     1 of 2 bottlesSusceptibilities done on previous cultures   06/15/2023 Positive on the 1st day of incubation (A)  Final   06/15/2023 Staphylococcus aureus MRSA (AA)  Final     Comment:     2 of 2 bottlesSusceptibilities done on previous cultures   06/15/2023 Positive on the 1st day of incubation (A)  Final   06/15/2023 Staphylococcus aureus MRSA (AA)  Final     Comment:     2 of 2 bottlesSusceptibilities done on previous cultures   06/14/2023 Positive on the 1st day of incubation (A)  Final   06/14/2023 Staphylococcus aureus MRSA (AA)  Final     Comment:     2 of 2 bottlesSusceptibilities done on previous cultures   06/14/2023 Positive on the 1st day of incubation (A)  Final   06/14/2023 Staphylococcus aureus MRSA (AA)  Final     Comment:     2 of 2 bottles     05/26/2023 No anaerobic organisms isolated  Final   05/26/2023 No anaerobic organisms isolated  Final   05/26/2023 No anaerobic organisms isolated  Final   05/26/2023 No anaerobic organisms isolated  Final   05/26/2023 No Growth  Final   05/26/2023 No Growth  Final   05/26/2023 No Growth  Final   05/26/2023 No Growth  Final   05/26/2023 No anaerobic organisms isolated  Final   05/26/2023 No Growth  Final   04/07/2023 No anaerobic organisms isolated  Final   04/07/2023 No Growth  Final   04/07/2023 No anaerobic organisms isolated  Final   04/07/2023 No Growth  Final   04/07/2023 No anaerobic organisms isolated  Final   04/07/2023 No Growth  Final   04/07/2023 No anaerobic organisms  isolated  Final   04/07/2023 No Growth  Final   04/07/2023 No anaerobic organisms isolated  Final   04/07/2023 No Growth  Final   04/07/2023 No anaerobic organisms isolated  Final   04/07/2023 No Growth  Final   11/22/2022 4+ Finegoldia magna (A)  Final     Comment:     Susceptibilities done on previous cultures   11/22/2022 No Growth  Final   11/22/2022 1+ Proteus mirabilis (A)  Final     Comment:     Susceptibilities done on previous cultures   11/22/2022 1+ Finegoldia magna (A)  Final     Comment:     Susceptibilities done on previous cultures   11/22/2022 No Growth  Final   11/22/2022 1+ Proteus mirabilis (A)  Final   11/22/2022 No anaerobic organisms isolated  Final   11/22/2022 No anaerobic organisms isolated  Final   11/22/2022 No anaerobic organisms isolated  Final   11/22/2022 No Growth  Final   11/22/2022 No Growth  Final   11/22/2022 No Growth  Final   11/22/2022 Isolated in broth only Proteus mirabilis (A)  Final     Comment:     On day 1 of incubationSusceptibilities done on previous cultures   11/22/2022 Isolated in broth only Proteus mirabilis (A)  Final     Comment:     On day 1 of incubationSusceptibilities done on previous cultures   11/22/2022 1+ Proteus mirabilis (A)  Final   11/22/2022 No anaerobic organisms isolated  Final   11/22/2022 No Growth  Final   11/22/2022 1+ Proteus mirabilis (A)  Final     Comment:     Susceptibilities done on previous cultures   11/14/2022 4+ Porphyromonas species (A)  Final     Comment:     Beta Lactamase Positive  Identification obtained by MALDI-TOF mass spectrometry research use only database. Test characteristics determined and verified by the Infectious Diseases Diagnostic Laboratory.   11/14/2022 1+ Finegoldia magna (A)  Corrected   11/14/2022 3+ Proteus mirabilis (A)  Final   11/14/2022 No Growth  Final     Metabolic Studies       Recent Labs   Lab Test 06/23/23  0916 06/23/23  0840 06/23/23  0155 06/22/23  2120 06/22/23  1341 06/22/23  1205 06/22/23  0806  06/22/23  0711 06/20/23  0758 06/20/23  0704 06/19/23  1145 06/19/23  0915 06/19/23  0622 06/18/23  1106 06/18/23  0728 06/16/23  0820 06/16/23  0605 03/21/23  0810 03/20/23  1919 01/10/23  1710 01/10/23  1105   NA  --  137  --   --  138  --   --  137  --  137  --  139  --   --  138  --  141   < >  --    < >  --    POTASSIUM  --  4.7  --   --  5.1  --   --  5.4*  --  4.0  --  4.2 4.0  --  4.1  --  4.2   < >  --    < >  --    CHLORIDE  --  105  --   --  105  --   --  105  --  104  --  105  --   --  104  --  104   < >  --    < >  --    CO2  --  22  --   --  23  --   --  23  --  25  --  22  --   --  24  --  24   < >  --    < >  --    ANIONGAP  --  10  --   --  10  --   --  9  --  8  --  12  --   --  10  --  13   < >  --    < >  --    BUN  --  50.2*  --   --  45.1*  --   --  41.9*  --  28.6*  --  26.6*  --   --  28.9*  --  27.7*   < >  --    < >  --    CR  --  2.32*  --   --  2.43*  --   --  2.22*  --  1.21*  --  1.07  --   --  1.05   < > 1.14   < >  --    < >  --    GFRESTIMATED  --  29*  --   --  28*  --   --  31*  --  64  --  74  --   --  76   < > 69   < >  --    < >  --    * 132* 161* 207* 155* 169*   < > 189*   < > 141*   < > 179*  --    < > 189*   < > 171*   < >  --    < >  --    A1C  --   --   --   --   --   --   --   --   --   --   --   --   --   --   --   --  5.6  --   --    < >  --    MAIKOL  --  7.2*  --   --  7.3*  --   --  7.1*  --  8.0*  --  8.1*  --   --  8.4*  --  8.3*   < >  --    < >  --    PHOS  --  4.7*  --   --  5.1*  --   --  5.1*   < >  --   --   --   --   --   --   --   --   --   --   --   --    MAG  --  1.9  --   --   --   --   --  1.6*  --  1.7  --   --   --   --  1.8   < > 1.7   < >  --   --   --    LACT  --   --   --   --   --   --   --   --   --   --   --   --   --   --   --   --   --   --  1.3  --  0.9    < > = values in this interval not displayed.     Hepatic Studies    Recent Labs   Lab Test 06/23/23  0840 06/22/23  1341 06/22/23  0711 06/20/23  0704 06/19/23  0915 06/16/23  0605  06/14/23  0651 05/31/23  0630 05/26/23  0753   BILITOTAL  --   --   --  0.2 0.2 0.2 0.2 0.2 0.3   ALKPHOS  --   --   --  93 94 114 110 74 92   ALBUMIN 2.4* 2.4* 2.2* 2.3* 2.6* 2.8* 2.8* 3.2* 3.7   AST  --   --   --  14 19 16 16 14 10   ALT  --   --   --  14 12 10 8 6* 6*     Hematology Studies      Recent Labs   Lab Test 06/23/23  0840 06/22/23  1926 06/22/23  1341 06/22/23  0711 06/21/23  0800 06/20/23  0704 06/19/23  0915   WBC 14.0*  --  18.1* 17.8* 10.1 8.9 10.1   HGB 7.5* 7.5* 6.7* 5.3* 7.3* 7.3* 7.1*   HCT 23.6*  --  21.4* 17.6* 24.1* 25.0* 24.0*     --  367 347 354 375 402     Arterial Blood Gas Testing    Recent Labs   Lab Test 06/15/23  1325 11/26/22  0945 11/22/22  1925 11/22/22  1644   PH 7.39  --   --   --    PCO2 44  --   --   --    PO2 83  --   --   --    HCO3 26  --   --   --    O2PER 2 32 40.0 47.0          Latest Ref Rng & Units 6/23/2023     8:40 AM 6/22/2023     1:41 PM 6/22/2023     7:11 AM 6/21/2023     8:00 AM 6/20/2023     7:04 AM   Transplant Immunosuppression Labs   Creat 0.67 - 1.17 mg/dL 2.32  2.43  2.22   1.21    Urea Nitrogen 8.0 - 23.0 mg/dL 50.2  45.1  41.9   28.6    WBC 4.0 - 11.0 10e3/uL 14.0  18.1  17.8  10.1  8.9           Imaging:   XR Chest Port 1 View  Result Date: 6/14/2023  IMPRESSION: Negative portable view. If further detail is needed comment consider upright PA and lateral examination when clinical condition permits. TAE MORRIS MD   SYSTEM ID:  [id number]    XR Abdomen 1 View  Result Date: 6/10/2023  IMPRESSION: Nonobstructive bowel gas pattern. Large stool burden. I have personally reviewed the examination and initial interpretation and I agree with the findings. JONNATHAN COLEMAN MD   SYSTEM ID:  [id number]

## 2023-06-23 NOTE — PROGRESS NOTES
Orthopedic Surgery Progress Note: 06/23/2023    Subjective:   No acute events overnight. Drainage from drain site; dressing placed by nursing. Pain well-controlled. Voiding spontaneously. Not yet moving bowels. No new concerns or complaints.    Objective:   /49 (BP Location: Right arm)   Pulse 66   Temp (!) 95.3  F (35.2  C) (Oral)   Resp 18   SpO2 98%   I/O this shift:  In: -   Out: 50 [Drains:50]  General: NAD. Resting comfortably in bed.  Respiratory: Breathing comfortably on RA.  Drain Output:  Output by Drain (mL) 06/21/23 0700 - 06/21/23 1459 06/21/23 1500 - 06/21/23 2259 06/21/23 2300 - 06/22/23 0659 06/22/23 0700 - 06/22/23 1459 06/22/23 1500 - 06/22/23 2259 06/22/23 2300 - 06/23/23 0622   Closed/Suction Drain Left Thigh 15        Closed/Suction Drain Anterior;Left Thigh Bulb 15 Syrian  35 60  45 50   Musculoskeletal: Focused exam of the left lower extremity: Drain in place. Fires GSC, TA, FHL, and EHL. SILT Sa/Rice/SP/DP/T nerve distributions. Brisk capillary refill.     Laboratory Data:  Lab Results   Component Value Date    WBC 18.1 (H) 06/22/2023    HGB 7.5 (L) 06/22/2023     06/22/2023      Assessment & Plan:   Waldo Bustos is a 72 year old male status post left revision total hip arthroplasty on 5/26/2023 with Dr. Meyers now presenting with left hip prosthetic joint infection status post left hip irrigation and debridement on 6/21/2023 with Dr. Meyers.    Goals for Today:  - Pain control and mobilization  - Discharge planning    Orthopedic Surgery primary  - Activity: Up with assist until independent. Posterior hip precautions x 3 months (no flexion beyond 90 degrees, no adduction beyond midline, and no internal rotation beyond midline).  - Weightbearing Status: WBAT LLE.  - Pain Management: Transition from IV to PO as tolerated.  - Antibiotics: Vancomycin and ceftriaxone per ID.  - Diet: OK for a diet from an orthopedic perspective.  - DVT Prophylaxis: SCDs and PTA apixaban 5 mg  BID.  - Imaging: None pending.  - Labs: Labs PRN.  - Bracing/Splinting: Abduction pillow while in bed.  - Dressings: Keep dressing C/D/I x 2 weeks.  - Drains: GUERRERO drain in place (remove in 2 weeks).  - Tran Catheter: None.   - Physical Therapy/Occupational Therapy: Evaluation and treatment.  - Cultures: None.   - Follow-up: Clinic with Dr. Meyers's team in 2 weeks without radiographs.    - Disposition: Pending progress with therapies, pain control on orals, and medical stability; anticipate discharge to TCU with timing TBD.    Orthopedic Surgery staff for this patient is Dr. Meyers.    ------------------------------------------------------------------------------------------    Nir Cronin MD  Orthopedic Surgery PGY4

## 2023-06-23 NOTE — PROGRESS NOTES
[facility]    Medicine Progress Note - Hospitalist Service, GOLD TEAM 21    Date of Admission:  6/17/2023    Assessment & Plan   Waldo Bustos is a 72 year old male with a history of type 2 diabetes, hyperlipidemia, chronic back pain, JAIR, DVT/PE on DOAC, and chronic left hip prosthetic joint infection initially admitted to [facility] on 11/22/2022 for scheduled explant of left hip prosthesis, debridement, and reconstruction with antibiotic spacer.  Postop course complicated by profound deconditioning.  He was admitted to TCU initially on 12/23/2022 for physical rehabilitation, however therapies no longer worked with patient after several months of an in bed therapy and his refusals to mobilize.  Underwent biopsy on 4/7/2023 without evidence of infection.  He was transferred back to [facility] for left total hip arthroplasty and reimplantation on 5/26/23 with Dr. Meyers.  Presented again to [place] for persistent MRSA bacteremia on 6/17.     Brief Hospital Summary:   Pt was admitted to continue infectious workup with MRSA bacteremia.  Admission blood cultures were positive.  ID and Ortho were consulted.  He was continued on vancomycin.  Hip cultures also grew Staph and klebsiella, though ortho suspected this may have been contaminated.  KEILA was obtained on 6/19, which was negative for endocarditis or other findings. Ortho took for washout/debridement of hip on 6/21.     Plan for today:   - talked with vascular about PICC, will consult nephrology for HALEY and to comment on PICC placement vs waiting for continued resolution of HALEY   - requiring oxygen still, encouraged IS use for suspected atelectasis driven hypoxemia    MRSA bacteremia due to possible left hip septic arthritis vs other source Blood cultures: 6/14, 6/15 + for MRSA (4/4), and 6/16 (1/2 GPCs) and 6/17 (2/2) GPCs. - started vancomycin 6/15.   6/17 sinovial fluid cultures with staph and  klebsiella. Purulent drainage noted at incision site at TCU, CT left hip concerning for infection (no mention of joint, mostly anterior in soft tissue) and couldn't exclude abscess/inf  - Continue vancomycin, added rocephin 6/21 and changed to cefepime 6/23; ID recommends 6 weeks IV, working on PICC placement and will continue current regimen; needs weekly vanc level, cbcd, cmp, crp while on IV abx  - blood cultures negative 6/19 and 6/20 (last + was 6/17), trend CRP q48h (downtrending prior to surgery 6/21)    Hypoxemic Respiratory Failure due to immobility, surgery  - patient with fluctuating oxygen requirement, few liters NC  - continue to follow; encourage IS    Acute blood loss anemia following surgery, complicated by use of blood thinner  - transfuse 2 units and monitor for improvement with pm recheck  - watch for bleeding; hold apixaban if need be - had unprovoked dvt in 2018 and would be on eliquis for life probably, at really high risk of recurrent DVT given recent procedures and illness/immobility    Acute Kidney Injury - multifactorial, see below  - baseline creatinine 1.1  - etiology: suspect combo of sepsis/inflammation, hypovolemia, blood loss anemia following surgery, vancomycin?  - volume (hypovolemia/congestion): euvolemic but tough to assess based on habitus  - nephrotoxins: no  - diabetes: yes; last A1C < 6, though  - sepsis: lil bit  - anemia: yes  - cardiac performance: normal based on KEILA from 6/19  - inflammation: from surgery and infection  - creatinine stabilized; if ATN from sepsis/infection expect creatinine to remain high a while before improving; consulted nephrology today    S/p left hip explantation of left hip antibiotic spacer and revision of left total hip arthroplasty (5/26/2023)  - ortho performed washout yesterday  -Activity: 50% PWB LLE with posterior hip precautions [id number]  -AFO ordered for foot drop but patient refusing to use  -PT/OT  -Knee to be kept even in a foam leg  elevator to relax sciatic nerve  -Pain: Scheduled robaxin 4 times a day, APAP PRN                 -oxycodone  to 5-7.5 mg discontinued                 -tramadol 25-50 mg every 6 hours PRN     Hx of unprovoked DVT in 2018   - on eliquis 5 mg bid     Altered mental status due to Delirium from illness/hospitalization  resolved  - delirium precuations      Type 2 diabetes mellitus  - Last hemoglobin A1c 5.6% on 6/16/2023.  On glipizide, metformin, Actos, and Victoza prior to hospitalizations and surgery.   - continue ISS    Constipation  -Continue Colace 100 mg twice daily  -Continue senna 1 tablet twice daily  -Continue MiraLAX daily     Hypomagnesemia  -monitor and replete      Severe malnutrition in the context of acute on chronic illness  Goal for patient is to consume % of meals TID. Goal is 5820-0526 kcals/daily. Protein needs- 100-126 grams/daily.   -RD consult and appreciate recs  -regular diet  -Weekly weights, daily I/O     Adjustment disorder with depressed mood  Prolonged grief disorder  Patient has been with depressed mood in setting of ongoing health issues, as evidenced by lack of motivation to work with therapies during both TCU admissions.  Past history of passive suicidal statements. Psychiatry saw patient during hospital admission on 5/31 due to lack of motivation with therapies..  Patient declined interest in starting any psychiatric medications.  Willing to engage with health psychology.   -Outpatient health psychology consult  -Continue to monitor for willingness to start antidepressant therapy     Stable and Resolved Issues:  Hyperlipidemia-continue PTA Lipitor 40 mg daily  JAIR-CPAP when sleeping       Diet: Regular Diet Adult  Room Service  Snacks/Supplements Adult: Other; Chocholate or Butter Pecan Glucerna with bkf trays daily; With Meals    DVT Prophylaxis: DOAC  Tran Catheter: Not present  Lines: None     Cardiac Monitoring: None  Code Status: Full Code      Clinically Significant Risk  "Factors        # Hyperkalemia: Highest K = 5.4 mmol/L in last 2 days, will monitor as appropriate     # Hypomagnesemia: Lowest Mg = 1.6 mg/dL in last 2 days, will replace as needed   # Hypoalbuminemia: Lowest albumin = 2.2 g/dL at 6/22/2023  7:11 AM, will monitor as appropriate    # Acute Kidney Injury, unspecified: based on a >150% or 0.3 mg/dL increase in last creatinine compared to past 90 day average, will monitor renal function         # Obesity: Estimated body mass index is 36.45 kg/m  as calculated from the following:    Height as of 5/26/23: 1.778 m (5' 10\").    Weight as of 6/16/23: 115.2 kg (254 lb).   # Severe Malnutrition: based on nutrition assessment         Disposition Plan     Expected Discharge Date: 06/26/2023                  Jem Malloy DO  Hospitalist Service, GOLD TEAM 21  [facility]  Securely message with [employer] (more info)  Text page via [facility] Paging/Directory   See signed in provider for up to date coverage information  ______________________________________________________________________    Interval History   Pain relatively well controlled this morning  Seems run down and resigned to whatever's recommended. Upset that someone ordered his breakfast for him and won't eat it.    Physical Exam   Vital Signs: Temp: (!) 95.6  F (35.3  C) Temp src: Oral BP: 124/56 Pulse: 65   Resp: 16 SpO2: 95 % O2 Device: Nasal cannula Oxygen Delivery: 3 LPM  Weight: 0 lbs 0 oz    Lying down in no distress  Sleeping but easily awoken  Speech clear, coherent  RR and wob normal    Medical Decision Making       45 MINUTES SPENT BY ME on the date of service doing chart review, history, exam, documentation & further activities per the note.      Data     I have personally reviewed the following data over the past 24 hrs:    14.0 (H)  \   7.5 (L)   / 312     137 105 50.2 (H) /  190 (H)   4.7 22 2.32 (H) \       ALT: N/A AST: N/A AP: N/A TBILI: N/A   ALB: 2.4 (L) " TOT PROTEIN: N/A LIPASE: N/A       Imaging results reviewed over the past 24 hrs:   No results found for this or any previous visit (from the past 24 hour(s)).

## 2023-06-24 NOTE — PROGRESS NOTES
Orthopedic Surgery Progress Note: 06/24/2023    Subjective:   No acute events overnight. More drainage from drain site; dressing placed by nursing. Pain well-controlled. Voiding spontaneously. Not yet moving bowels. No new concerns or complaints.    Objective:   /43   Pulse 76   Temp (!) 95.9  F (35.5  C) (Oral)   Resp 12   SpO2 93%   No intake/output data recorded.  General: NAD. Resting comfortably in bed.  Respiratory: Breathing comfortably on RA.  Drain Output:  Output by Drain (mL) 06/22/23 0700 - 06/22/23 1459 06/22/23 1500 - 06/22/23 2259 06/22/23 2300 - 06/23/23 0659 06/23/23 0700 - 06/23/23 1459 06/23/23 1500 - 06/23/23 2259 06/23/23 2300 - 06/24/23 0659 06/24/23 0700 - 06/24/23 0745   Closed/Suction Drain Left Thigh          Closed/Suction Drain Anterior;Left Thigh Bulb 15 French  45 50 60  60    Musculoskeletal: Focused exam of the left lower extremity: Drain in place. Fires GSC, TA, FHL, and EHL. SILT Sa/Rice/SP/DP/T nerve distributions. Brisk capillary refill.     Laboratory Data:  Lab Results   Component Value Date    WBC 14.9 (H) 06/24/2023    HGB 7.3 (L) 06/24/2023     06/24/2023      Assessment & Plan:   Waldo Bustos is a 72 year old male status post left revision total hip arthroplasty on 5/26/2023 with Dr. Meyers now presenting with left hip prosthetic joint infection status post left hip irrigation and debridement on 6/21/2023 with Dr. Meyers.    Goals for Today:  - Pain control and mobilization  - Please strip drain each time nurse is in the room  - OK for nursing to remove and replace absorptive dressing over drain site as needed  - Discharge planning    Orthopedic Surgery primary  - Activity: Up with assist until independent. Posterior hip precautions x 3 months (no flexion beyond 90 degrees, no adduction beyond midline, and no internal rotation beyond midline).  - Weightbearing Status: WBAT LLE.  - Pain Management: Transition from IV to PO as tolerated.  - Antibiotics:  Vancomycin and ceftriaxone per ID.  - Diet: OK for a diet from an orthopedic perspective.  - DVT Prophylaxis: SCDs and PTA apixaban 5 mg BID.  - Imaging: None pending.  - Labs: Labs PRN.  - Bracing/Splinting: Abduction pillow while in bed.  - Dressings: Keep dressing C/D/I x 2 weeks.  - Drains: GUERRERO drain in place (remove in 2 weeks).  - Tran Catheter: None.   - Physical Therapy/Occupational Therapy: Evaluation and treatment.  - Cultures: None.   - Follow-up: Clinic with Dr. Meyers's team in 2 weeks without radiographs.    - Disposition: Pending progress with therapies, pain control on orals, and medical stability; anticipate discharge to TCU with timing TBD.    Orthopedic Surgery staff for this patient is Dr. Meyers.    ------------------------------------------------------------------------------------------    Nir Cronin MD  Orthopedic Surgery PGY4

## 2023-06-24 NOTE — PROGRESS NOTES
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  Postop course complicated by profound deconditioning.  He was admitted to TCU initially on 12/23/2022 for physical rehabilitation, however therapies no longer worked with patient after several months of an in bed therapy and his refusals to mobilize.  Underwent biopsy on 4/7/2023 without evidence of infection.  He was transferred back to [facility] for left total hip arthroplasty and reimplantation on 5/26/23 with Dr. Meyers.  Presented again to [place] for persistent MRSA bacteremia on 6/17.     Brief Hospital Summary:   Pt was admitted to continue infectious workup with MRSA bacteremia.  Admission blood cultures were positive, ID and Ortho were consulted. KEILA was obtained on 6/19, which was negative for endocarditis, ortho took for washout/debridement of hip on 6/21. ID recommending 6 weeks abx, patient recovering from HALEY - will determine vascular access for IV antibiotics in the next 1-2 days prior to [facility].    Plan for today:   - nephrology wants to hold off on PICC due to AHLEY, recommended midline but this is contra-indicated with vanc apparently due to risk of extravasation + they'd use the same veins for the midline that would need to be spared in case of worsening kidney function down the road; would need short tunneled line with IR if kidney function doesn't recover sufficiently to make PICC acceptable, can make a call Monday potentially as his creatinine came down  today    MRSA bacteremia due to possible left hip septic arthritis vs other source Blood cultures: 6/14, 6/15 + for MRSA (4/4), and 6/16 (1/2 GPCs) and 6/17 (2/2) GPCs. - started vancomycin 6/15.   6/17 sinovial fluid cultures with staph and klebsiella. Purulent drainage noted at incision site at TCU, CT left hip concerning for infection (no mention of joint, mostly anterior in soft tissue) and couldn't exclude abscess/inf  - Continue vancomycin, added rocephin 6/21 and changed to cefepime 6/23; ID recommends 6 weeks IV, access issues discussed above, needs weekly vanc level, cbcd, cmp, crp while on IV abx  - blood cultures negative 6/19 and 6/20 (last + was 6/17), trend CRP q48h (way down since initiation of treatment and washout)    Hypoxemic Respiratory Failure due to immobility, surgery - resolved (sena)  - patient with fluctuating oxygen requirement but off O2 since yesterday  - continue to follow; encourage IS    Acute blood loss anemia following surgery, complicated by use of blood thinner  - hgb holding steady; got 3 units day after surgery did joint washout for acute blood loss anemia  - watch for bleeding; hold apixaban if need be - had unprovoked dvt in 2018 and would be on eliquis for life probably, at really high risk of recurrent DVT given recent procedures and illness/immobility    Acute Kidney Injury - multifactorial, see below  - baseline creatinine 1.1  - etiology: suspect combo of sepsis/inflammation, hypovolemia, blood loss anemia following surgery, vancomycin?  - volume (hypovolemia/congestion): euvolemic but tough to assess based on habitus  - nephrotoxins: no  - diabetes: yes; last A1C < 6, though  - sepsis: lil bit, resolved now  - anemia: yes  - cardiac performance: normal based on KEILA from 6/19  - inflammation: from surgery and infection  - creatinine stabilized; if ATN from sepsis/infection expect creatinine to remain high a while before improving; neph ordered C3 and 4, pending    S/p left  hip explantation of left hip antibiotic spacer and revision of left total hip arthroplasty (5/26/2023)  - ortho performed washout 6/21  -Activity: 50% PWB LLE with posterior hip precautions [id number]  -AFO ordered for foot drop but patient refusing to use  -PT/OT  -Knee to be kept even in a foam leg elevator to relax sciatic nerve  -Pain: Scheduled robaxin 4 times a day, APAP PRN                 -oxycodone  to 5-7.5 mg discontinued                 -tramadol 25-50 mg every 6 hours PRN     Hx of unprovoked DVT in 2018   - on eliquis 5 mg bid     Altered mental status due to Delirium from illness/hospitalization  resolved  - delirium precuations      Type 2 diabetes mellitus  - Last hemoglobin A1c 5.6% on 6/16/2023.  On glipizide, metformin, Actos, and Victoza prior to hospitalizations and surgery.   - continue ISS    Constipation  -Continue Colace 100 mg twice daily  -Continue senna 1 tablet twice daily  -Continue MiraLAX daily     Hypomagnesemia  -monitor and replete      Severe malnutrition in the context of acute on chronic illness  Goal for patient is to consume % of meals TID. Goal is 9350-6146 kcals/daily. Protein needs- 100-126 grams/daily.   -RD consult and appreciate recs  -regular diet  -Weekly weights, daily I/O     Adjustment disorder with depressed mood  Prolonged grief disorder  Patient has been with depressed mood in setting of ongoing health issues, as evidenced by lack of motivation to work with therapies during both TCU admissions.  Past history of passive suicidal statements. Psychiatry saw patient during hospital admission on 5/31 due to lack of motivation with therapies..  Patient declined interest in starting any psychiatric medications.  Willing to engage with health psychology.   -Outpatient health psychology consult  -Continue to monitor for willingness to start antidepressant therapy     Stable and Resolved Issues:  Hyperlipidemia-continue PTA Lipitor 40 mg daily  JAIR-CPAP when  "sleeping       Diet: Regular Diet Adult  Snacks/Supplements Adult: Other; Chocholate or Butter Pecan Glucerna with bkf trays daily; With Meals    DVT Prophylaxis: DOAC  Tran Catheter: Not present  Lines: None     Cardiac Monitoring: None  Code Status: Full Code      Clinically Significant Risk Factors              # Hypoalbuminemia: Lowest albumin = 2.2 g/dL at 6/22/2023  7:11 AM, will monitor as appropriate    # Acute Kidney Injury, unspecified: based on a >150% or 0.3 mg/dL increase in last creatinine compared to past 90 day average, will monitor renal function         # Obesity: Estimated body mass index is 36.45 kg/m  as calculated from the following:    Height as of 5/26/23: 1.778 m (5' 10\").    Weight as of 6/16/23: 115.2 kg (254 lb).   # Severe Malnutrition: based on nutrition assessment         Disposition Plan     Expected Discharge Date: 06/26/2023                  Jem Malloy DO  Hospitalist Service, GOLD TEAM 21  [facility]  Securely message with [employer] (more info)  Text page via [facility] Paging/Directory   See signed in provider for up to date coverage information  ______________________________________________________________________    Interval History   Nothing new this morning.     Physical Exam   Vital Signs: Temp: (!) 96.3  F (35.7  C) Temp src: Oral BP: 137/58 Pulse: 75   Resp: 12 SpO2: 95 % O2 Device: None (Room air) Oxygen Delivery: 3 LPM  Weight: 0 lbs 0 oz    Lying down in no distress  Sleeping but easily awoken  Speech clear, coherent  RR and wob normal    Medical Decision Making       35 MINUTES SPENT BY ME on the date of service doing chart review, history, exam, documentation & further activities per the note.      Data     I have personally reviewed the following data over the past 24 hrs:    14.9 (H)  \   7.3 (L)   / 290     137 106 50.2 (H) /  157 (H)   4.5 22 2.05 (H) \       ALT: N/A AST: N/A AP: N/A TBILI: N/A   ALB: 2.4 (L) TOT " PROTEIN: N/A LIPASE: N/A       Procal: N/A CRP: N/A Lactic Acid: N/A         Imaging results reviewed over the past 24 hrs:   Recent Results (from the past 24 hour(s))   US Renal Complete Non-Vascular    Narrative    EXAMINATION: US RENAL COMPLETE NON-VASCULAR, 6/23/2023 3:10 PM     COMPARISON: Pelvic CT 7/6/2018    HISTORY: HALEY    TECHNIQUE: The kidneys and bladder were scanned in the standard  fashion with specialized ultrasound transducer(s) using both gray  scale and limited color/spectral Doppler techniques.    FINDINGS:    Right kidney: Measures 11.4 cm in length. No focal mass. No  hydronephrosis.    Left kidney: Measures 11.6 cm in length. No focal mass. No  hydronephrosis. Difficult visualization of the left kidney    Bladder: Partially decompressed. Diffuse wall thickening of the  urinary bladder, likely due to partial decompression.      Impression    IMPRESSION:    1. No hydronephrosis.  2. Apparent urinary bladder wall thickening favor secondary to  decompression.    I have personally reviewed the examination and initial interpretation  and I agree with the findings.    MILIND QUINTERO MD         SYSTEM ID:  [id number]

## 2023-06-24 NOTE — PHARMACY-VANCOMYCIN DOSING SERVICE
Pharmacy Vancomycin Note  Date of Service 2023  Patient's  1950   72 year old, male    Indication: MRSA Bacteremia, vanco MARCOS</=0.5  Day of Therapy:  Since 6/15  Current vancomycin regimen:  500 mg IV q24h  Current vancomycin monitoring method: AUC  Current vancomycin therapeutic monitoring goal: 400-600 mg*h/L    [employer] Prediction of Current Vancomycin Regimen  Loading dose: N/A  Regimen: 500 mg IV every 24 hours.  Start time: 15:25 on 2023  Exposure target: AUC24 (range)400-600 mg/L.hr   AUC24,ss: 407 mg/L.hr  Probability of AUC24 > 400: 56 %  Ctrough,ss: 15 mg/L  Probability of Ctrough,ss > 20: 1 %  Probability of nephrotoxicity (Lodise HAJA ): 10 %    Current estimated CrCl = Estimated Creatinine Clearance: 46.8 mL/min (A) (based on SCr of 1.84 mg/dL (H)).    Creatinine for last 3 days  2023:  7:11 AM Creatinine 2.22 mg/dL;  1:41 PM Creatinine 2.43 mg/dL  2023:  8:40 AM Creatinine 2.32 mg/dL  2023:  6:54 AM Creatinine 2.05 mg/dL;  1:40 PM Creatinine 1.84 mg/dL    Recent Vancomycin Levels (past 3 days)  2023:  8:40 AM Vancomycin 31.4 ug/mL  2023:  1:39 PM Vancomycin 29.9 ug/mL    Vancomycin IV Administrations (past 72 hours)                   vancomycin (VANCOCIN) 500 mg vial to attach to  mL bag (mg) 500 mg New Bag 23 1525    vancomycin (VANCOCIN) 1000 mg in dextrose 5% 200 mL PREMIX (mg) 1,000 mg New Bag 23 1334                Nephrotoxins and other renal medications (From now, onward)    Start     Dose/Rate Route Frequency Ordered Stop    23 1500  vancomycin (VANCOCIN) 500 mg vial to attach to  mL bag         500 mg  over 1 Hours Intravenous EVERY 24 HOURS 23 1102      23 1400  sodium chloride 0.9 % 18.4 mL with ropivacaine (NAROPIN) 5 MG/ mg, EPINEPHrine (ADRENALIN) 600 mcg, ketorolac (TORADOL) 30 mg          INTRA-ARTICULAR ONCE 23 1354               Contrast Orders - past 72 hours (72h ago, onward)     None          Interpretation of levels and current regimen:  Vancomycin level is reflective of -600    Has serum creatinine changed greater than 50% in last 72 hours: No    Urine output:  unable to determine    Renal Function: Stable      Plan:  1. Continue Current Dose  2. Vancomycin monitoring method: AUC  3. Vancomycin therapeutic monitoring goal: 400-600 mg*h/L  4. Pharmacy will check vancomycin levels as appropriate in 1-3 Days.  5. Serum creatinine levels will be ordered a minimum of twice weekly.    Arleen Seaman RPH

## 2023-06-24 NOTE — PLAN OF CARE
VS: /43   Pulse 76   Temp (!) 95.9  F (35.5  C) (Oral)   Resp 12   SpO2 93%     O2: O2 SATS >90% denies chest pain,  or SBO   Output: Voids adequately   Last BM: 6/24//23 BS present all x4 quadrants    Activity: Bed fast    Up for meals? N/A   Skin: L hip surgical incision,scalling/ scab to bilateral lower foot, readiness to coccyx barrier cream applied, mepilex in place as well   Pain: Pain managed with prn tylenol,Tramadol  And robaxin   CMS: Intact Denies numbness and tingling   Dressing: Bloody red Drainage to dressing bon left hip . Ortho paged waiting for the  feedback   Diet: Regular   LDA: 2 L PIVS . GUERRERO drain    Equipment: IV Pole,  celling  lift   Plan: Continue to monitor  and update,    Additional Info:

## 2023-06-24 NOTE — PROGRESS NOTES
Spoke with Dr. Malloy 06/23/23 re PICC placement and HALEY/Renal function.  Dr. Malloy consulted with nephrology and they do not recommend a PICC at this time.  Midline ordered 6/24/23 and writer offered that Midline would not be recommended due to Vanco x 6 weeks.  Recommended IR consult for short tunneled line.  MD will continue to monitor labs and renal function.  Will keep PICC consult open and follow up Monday to assess if PICC possible at that time.

## 2023-06-24 NOTE — PROGRESS NOTES
Nephrology Progress Note  06/24/2023         Assessment & Recommendations:   Waldo Bustos is a 72 year old year old male with PMH significant for HTN, diabetes, JAIR, obesity, persistent prosthtic joint infection of left hip admitted for persistent MRSA bacteremia. Nephrology consulted for HALEY.    HALEY stage II, now improving  Pt with baseline creatinine of 0.9-1.1, had creatinine trend on 6/20 likely multifactorial in setting of acute blood loss anemia, infection,vancomycin toxicity, obstruction and or hemodynamic changes. Peaked at 2.4 on 6/22 but down trending for past 2 days. Unclear if all his urine is being captured as his UOP charted 50 ml since mid night. Seems like pt is in renal recovery and hoping continue to improve provided no other injury.  - continue bladder scans to rule out acute obstruction   - strict I and O, daily weights   - agree with holding vancomycin  - continue to avoid PICC line placement for next couple days and if his renal function continue to improve, will reconsider PICC line placement. If mid line is available, preferred mid line.    Acute blood loss anemia  Acute on chronic anemia   Improved and stabilized post transfusion. Continue to monitor and transfuse as needed per primary team.    Recommendations were communicated to primary team via this note    Zayda Lucero MD   Division of Renal Disease and Hypertension  [facility]  [employer]    Interval History :   Nursing and provider notes from last 24 hours reviewed.  In the last 24 hours Waldo Bustos been hemodynamically stable.    Review of Systems:   I reviewed the following systems:  GI: no change in appetite. no nausea or vomiting or diarrhea.   Neuro:  no confusion  Constitutional:  no fever or chills  CV: no dyspnea or edema.  no chest pain.    Physical Exam:   I/O last 3 completed shifts:  In: -   Out: 120 [Drains:120]   /58 (BP Location: Right arm, Patient Position: Semi-Fuentes's, Cuff Size:  Adult Large)   Pulse 75   Temp (!) 96.3  F (35.7  C) (Oral)   Resp 12   SpO2 95%      General : Pt awake, not in acute distress   Lungs : anterior lung fields are clear  Cardiac : S1, S2 present  Abdomen : Soft/ND/NT  LE : Edema noted  Dialysis Access : N/A    Labs:   All labs reviewed by me  Electrolytes/Renal - Recent Labs   Lab Test 06/24/23  0801 06/24/23  0654 06/24/23  0251 06/23/23  0916 06/23/23  0840 06/22/23  2120 06/22/23  1341 06/22/23  0806 06/22/23  0711   NA  --  137  --   --  137  --  138  --  137   POTASSIUM  --  4.5  --   --  4.7  --  5.1  --  5.4*   CHLORIDE  --  106  --   --  105  --  105  --  105   CO2  --  22  --   --  22  --  23  --  23   BUN  --  50.2*  --   --  50.2*  --  45.1*  --  41.9*   CR  --  2.05*  --   --  2.32*  --  2.43*  --  2.22*   * 166* 174*   < > 132*   < > 155*   < > 189*   MAIKOL  --  7.2*  --   --  7.2*  --  7.3*  --  7.1*   MAG  --  1.7  --   --  1.9  --   --   --  1.6*   PHOS  --  4.2  --   --  4.7*  --  5.1*  --  5.1*    < > = values in this interval not displayed.       CBC -   Recent Labs   Lab Test 06/24/23  0654 06/23/23  0840 06/22/23  1926 06/22/23  1341   WBC 14.9* 14.0*  --  18.1*   HGB 7.3* 7.5* 7.5* 6.7*    312  --  367       LFTs -   Recent Labs   Lab Test 06/24/23  0654 06/23/23  0840 06/22/23  1341 06/22/23  0711 06/20/23  0704 06/19/23  0915 06/16/23  0605   ALKPHOS  --   --   --   --  93 94 114   BILITOTAL  --   --   --   --  0.2 0.2 0.2   ALT  --   --   --   --  14 12 10   AST  --   --   --   --  14 19 16   PROTTOTAL  --   --   --   --  5.9* 6.2* 6.2*   ALBUMIN 2.4* 2.4* 2.4*   < > 2.3* 2.6* 2.8*    < > = values in this interval not displayed.     Current Medications:    apixaban ANTICOAGULANT  5 mg Oral BID     atorvastatin  40 mg Oral Daily     ceFEPIme  2 g Intravenous Q12H     insulin aspart  1-7 Units Subcutaneous TID AC     insulin aspart  1-5 Units Subcutaneous At Bedtime     lactobacillus rhamnosus (GG)  1 capsule Oral BID      methocarbamol  500 mg Oral 4x Daily     miconazole   Topical BID     multivitamin w/minerals  1 tablet Oral Daily     polyethylene glycol  17 g Oral BID     senna-docusate  1 tablet Oral BID     sodium chloride (PF)  10 mL Intracatheter Q8H     sodium chloride 0.9 % 18.4 mL with ropivacaine (NAROPIN) 5 MG/ mg, EPINEPHrine (ADRENALIN) 600 mcg, ketorolac (TORADOL) 30 mg   INTRA-ARTICULAR Once     vancomycin  500 mg Intravenous Q24H       Zayda Lucero MD

## 2023-06-25 NOTE — PLAN OF CARE
VS: Vital signs:  Temp: 97.7  F (36.5  C) Temp src: Oral BP: (!) 147/52 Pulse: 61   Resp: 16 SpO2: 99 % O2 Device: None (Room air) Oxygen Delivery: 1 LPM        O2: 95 % RA.    Output: Voids spontaneously via urinal    Last BM: 6/24/2023.   Activity: Bedfast. Refused repositioning    Up for meals? Yes    Skin: Refused skin assessment. Visible skin intact.heel pressure injury. Mepilex in place    Pain: Managed with PRN tramadol.     Neuro / CMS: Baseline numbness and tingling.    Dressing: Dressed changed. See progresses note for details    Diet: Regular diet    LDA:  R PIV. Saline locked    Plan: Continue with plan of care    Additional Info:  Hemoglobin 6.8. Transfused one unit of blood. VSS. Recheck in AM.  Pt is severally withdrawn. Refused everything except med's. Poor appetite. Had one meal.

## 2023-06-25 NOTE — PLAN OF CARE
VS: Vital signs:  Temp: 97.6  F (36.4  C) Temp src: Oral BP: 133/65 Pulse: 72   Resp: 14 SpO2: 96 % O2 Device: None (Room air) Oxygen Delivery: 3 LPM         O2: 96 % 2 L of 02.    Output: Voids spontaneously via urinal    Last BM: 6/23/2023.   Activity: Bedfast    Up for meals? Yes    Skin: Refused skin assessment. Visible skin intact. Leg leg heel pressure injury. Mepilex in place    Pain: Managed with scheduled robaxin    Neuro / CMS: Baseline numbness and tingling    Dressing: L hip incision dressed 40% saturated    Diet: Regular diet    LDA:  R PIV. Running IV abx   Plan: Continue with plan of care    Additional Info:

## 2023-06-25 NOTE — PLAN OF CARE
VS: /42 (BP Location: Right arm, Patient Position: Semi-Fuentes's)   Pulse 79   Temp (!) 96.4  F (35.8  C) (Oral)   Resp 16   SpO2 95%     O2: Stable on room air>90%   Output: Using Bedside urinal    Last BM: 6/24 loose stool. Hold senna and mirax   Activity: Not OOB overnight. Refused repositioning. WBAT LLE  Assist of 2-3 with cares   Skin: Left hip surgical incision   Left foot heel pressure ulcer. Mepilex dressing   Pain: Managed with tramadol x 2 and tylenol   CMS: AXO x4.   Baseline neuropathy   Dressing: L hip incision still leaking - reinforced  dressing   Diet: Reg diet. BG checks and insulin   LDA: Right PIV-saline locked  GUERRERO drain- output 30 ml   Equipment: PT Belongings, iv pole and pump   Plan: Count with Plan of care. Antibiotic therapy   Pending TCU discharge    Additional Info:

## 2023-06-25 NOTE — PROGRESS NOTES
Brief Nephrology progress note     Waldo Bustos is a 72 year old year old male with PMH significant for HTN, diabetes, JAIR, obesity, persistent prosthtic joint infection of left hip admitted for persistent MRSA bacteremia. Nephrology consulted for HALEY. He is been hemodynamically stable for past 24 hours.  Per chart review, his electrolytes been stable. His creatinine is down trending now, indicating in renal recovery. But his hemoglobin drop was concerning to hinder his renal recovery. Anemia management per primary team.  - will continue to hold on placing PICC Line but his renal function continue to improve and mid line is not an option, will rethink about PICC Line.    Zayda Lucero MD  Page [phone]

## 2023-06-25 NOTE — CARE PLAN
Progresses note:     Concern with GUERRERO output since Friday. Pt has continuous drainage from the site. Dressing change reinforced numerous times. Ortho stripped the GUERRERO on Friday, no concern for clot. There is more drainage from the site directly than into the GUERRERO bulb. (6/24 10 ml output for 12 hour) 6/25 <10 ml for 8 hours but site has been reinforced 2x. Ortho notified again. No further action but to keep reinforcing.

## 2023-06-25 NOTE — PROGRESS NOTES
Orthopedic Surgery Progress Note: 06/25/2023    Subjective:   No acute events overnight. More drainage from drain site; drain stripped and dressing replaced this morning. Pain well-controlled. Voiding spontaneously. Not yet moving bowels. No new concerns or complaints.    Objective:   /42 (BP Location: Right arm, Patient Position: Semi-Fuentes's)   Pulse 79   Temp (!) 96.4  F (35.8  C) (Oral)   Resp 16   SpO2 95%   I/O this shift:  In: -   Out: 150 [Urine:120; Drains:30]  General: NAD. Resting comfortably in bed.  Respiratory: Breathing comfortably on RA.  Drain Output:  Output by Drain (mL) 06/23/23 0700 - 06/23/23 1459 06/23/23 1500 - 06/23/23 2259 06/23/23 2300 - 06/24/23 0659 06/24/23 0700 - 06/24/23 1459 06/24/23 1500 - 06/24/23 2259 06/24/23 2300 - 06/25/23 0639   Closed/Suction Drain Left Thigh         Closed/Suction Drain Anterior;Left Thigh Bulb 15 French 60  60   30   Musculoskeletal: Focused exam of the left lower extremity: Drain in place. Fires GSC, TA, FHL, and EHL. SILT Sa/Rice/SP/DP/T nerve distributions. Brisk capillary refill.     Laboratory Data:  Lab Results   Component Value Date    WBC 14.9 (H) 06/24/2023    HGB 7.3 (L) 06/24/2023     06/24/2023      Intraoperative cultures 6/21/2023: MRSA    Assessment & Plan:   Waldo Bustos is a 72 year old male status post left revision total hip arthroplasty on 5/26/2023 with Dr. Meyers now presenting with left hip prosthetic joint infection status post left hip irrigation and debridement on 6/21/2023 with Dr. Meyers.    Goals for Today:  - Pain control and mobilization  - Please strip drain each time nurse is in the room  - OK for nursing to remove and replace absorptive dressing over drain site as needed  - Discharge planning    Orthopedic Surgery primary  - Activity: Up with assist until independent. Posterior hip precautions x 3 months (no flexion beyond 90 degrees, no adduction beyond midline, and no internal rotation beyond  midline).  - Weightbearing Status: WBAT LLE.  - Pain Management: Transition from IV to PO as tolerated.  - Antibiotics: Vancomycin and cefepime per ID.  - Diet: OK for a diet from an orthopedic perspective.  - DVT Prophylaxis: SCDs and PTA apixaban 5 mg BID.  - Imaging: None pending.  - Labs: Labs PRN.  - Bracing/Splinting: Abduction pillow while in bed.  - Dressings: Keep dressing C/D/I x 2 weeks.  - Drains: GUERRERO drain in place (remove in 2 weeks).  - Tran Catheter: None.   - Physical Therapy/Occupational Therapy: Evaluation and treatment.  - Cultures: Intraoperative cultures 6/21/2023.  - Follow-up: Clinic with Dr. Meyers's team in 2 weeks without radiographs.    - Disposition: Pending progress with therapies, pain control on orals, and medical stability; anticipate discharge to TCU with timing TBD.    Orthopedic Surgery staff for this patient is Dr. Meyers.    ------------------------------------------------------------------------------------------    Nir Cronin MD  Orthopedic Surgery PGY4   no dysuria/no vaginal bleeding/no fever

## 2023-06-25 NOTE — PROGRESS NOTES
Brief Orthopedic Surgery Update:    Discussed ongoing drain concerns with Dr. Meyers. At this time, he would like to continue the drain despite leakage around the drain site. Continue to dress and reinforce the drain site as needed.    Nir Cronin MD  Orthopedic Surgery PGY4

## 2023-06-25 NOTE — PROGRESS NOTES
General ID Service [facility]: Follow Up Note      Patient:  Waldo Bustos, Date of birth 1950, Medical record number [id number]  Date of Visit:  June 19, 2023         Assessment and Recommendations:   Problem List:  1. MRSA bacteremia, suspected secondary to left hip PJI and associated soft tissue infection; KEILA negative for IE   -6/14 blood cultures: 4 of 4 bottles positive  -6/15 blood cultures: 4 of 4 bottles positive  -6/17 blood cultures: 2 of 4 bottles positive  -6/19 NGTD  -6/20 NGTD  2. Chronic left hip PJI, s/p 2 stage revision with re-implantation on 5/26/23; I&D on 6/21/23 without liner exchanged  -GUERRERO drain in place growing MRSA, Corynebacterium, and Klebsiella (collected after drain was already established)  -6/21 intraop cultures: MRSA, C.acnes, and Pseudomonas  3. DM2  4. RA  5. Venous insufficiency  6. QTC = 490 (previously 421)    Recommendations:  - Continue vancomycin (pharmacy to dose) and cefepime 2g Q8H (target dose, may need to adjust for HALEY)   - Continue cefepime today   - Discontinue cefepime on 6/27 and start ciprofloxacin as below    - Check QTc 48 hours after starting ciprofloxacin     - At discharge: vancomycin IV and ciprofloxacin 750mg PO BID   - Anticipate an initial 6 weeks of IV vancomycin therapy, followed by likely transition to oral anti-staph antibiotics   - Long-term antibiotic plan to be determined in outpatient ID follow up   - Weekly CBC w/diff, CMP, CRP, and vancomycin level while in IV therapy   - Would check QTc measurement once weekly during the first 2 weeks of quinolone therapy, can discontinue after that if it remains stable      Discussion:  Pt who was followed by ID team at  (last by Johnny Mercado and Shira on [facility]), transferred to [place] for KEILA, now back to [facility] on 6/20/23.      73yo M history of chronic left hip PJI, most recently s/p revision left BELLA on 5/26, who has been in TCU since discharge on 6/2, but then developed MRSA  bacteremia with confusion and somnolence. CT hip without contrast showed soft tissue stranding and multiple foci of subcutaneous emphysema about the left hip, especially anteriorly, partially visualized fluid collection within subcutaneous fat superficial-medial to the proximal adductor longus (caudal extent of which is incompletely visualized) which appears enlarged compared to outside CT 10/25/2022. Infection/abscess not excluded.  Blood cultures remained persistently positive for MRSA ( 6/14-6/17) but have been negative since 6/19/23. KEILA negative. Presumed hip infection and they obtained cultures from drain on 6/17/2023 - showing MRSA, Klebsiella, and Corynebacterium. Patient taken to OR for DAIR with Dr. Meyers on 6/21. OR report without any overt infection, but I did confirm with Dr. Meyers today that we should treat this as a prosthetic joint infection, which he agreed. Intraoperative cultures collected. Ceftriaxone added to vancomycin after I&D for coverage of Klebsiella (grown from drain). Intraop cultures without Klebsiella or Corynebacterium, but now showing Pseudomonas. Transitioned to cefepime, along with vancomycin to treat MRSA.    Will plan 6 weeks of IV vancomycin along with high-dose oral ciprofloxacin 750mg PO BID. Likely to discharge to TCU. Patient will follow up in ID clinic for ongoing antibiotic management thereafter.         ID will sign off at this time. Please contact me with any additional questions or concerns.    Shelton Meredith MD  Infectious Diseases   [phone]        Interval History:   Afebrile. WBC and CRP trending down. Planned to get a tunneled line tomorrow (nephrology wants to avoid PICC). He reports multiple loose stools this morning after being severely constipated and receiving laxatives. If stooling continues into tomorrow will consider C.diff testing.         Current Antimicrobials     Vancomycin  Ceftriaxone         Physical Exam:   Ranges for vital signs:  Temp:  [97.7  F  (36.5  C)-98.5  F (36.9  C)] 98.5  F (36.9  C)  Pulse:  [68-76] 76  Resp:  [16] 16  BP: (125-139)/(44-55) 139/47  SpO2:  [97 %-98 %] 97 %    Intake/Output Summary (Last 24 hours) at 6/16/2023 1115  Last data filed at 6/15/2023 2239  Gross per 24 hour   Intake 50 ml   Output --   Net 50 ml     Exam:  GENERAL: Well-developed, well-nourished. Pleasant and cooperative. Discussed antibiotic plan.  ENT:  Head is normocephalic, atraumatic.   EYES:  Eyes have anicteric sclerae.    LUNGS:  Breathing comfortably  EXT: Extremities warm and without edema. Left hip with GUERRERO drain in place, serosanguinous fluid in line and collection vessel.  SKIN:  No acute rashes. Surgical site with no obvious erythema or purulence.  NEUROLOGIC:  Grossly nonfocal, though confused and somnolent         Laboratory Data:   Reviewed.  Pertinent for:    Microbiology:  Culture   Date Value Ref Range Status   06/21/2023 Culture in progress  Preliminary   06/21/2023 1+ Cutibacterium (Propionibacterium) acnes (A)  Preliminary     Comment:     Susceptibility testing of Cutibacterium (Propionibacterium) species is not done from this source. This organism is susceptible to penicillin and cefotaxime and most are susceptible to clindamycin.   06/21/2023 No anaerobic organisms isolated after 4 days  Preliminary   06/21/2023 No anaerobic organisms isolated after 4 days  Preliminary   06/21/2023 No anaerobic organisms isolated after 4 days  Preliminary   06/21/2023 1+ Staphylococcus aureus (A)  Final     Comment:     Susceptibilities done on previous cultures   06/21/2023 1+ Pseudomonas aeruginosa (A)  Final   06/21/2023 1+ Staphylococcus aureus MRSA (A)  Final   06/21/2023 2+ Staphylococcus aureus (A)  Final     Comment:     Susceptibilities done on previous cultures   06/21/2023 1+ Pseudomonas aeruginosa (A)  Final     Comment:     Susceptibilities done on previous cultures   06/21/2023 1+ Staphylococcus aureus (A)  Final     Comment:     Susceptibilities done  on previous cultures   06/21/2023 2+ Staphylococcus aureus (A)  Final     Comment:     Susceptibilities done on previous cultures   06/21/2023 No anaerobic organisms isolated after 4 days  Preliminary   06/21/2023 2+ Staphylococcus aureus MRSA (A)  Final   06/20/2023 No Growth  Final   06/20/2023 No Growth  Final   06/19/2023 No Growth  Final   06/19/2023 No Growth  Final   06/17/2023 4+ Staphylococcus aureus MRSA (A)  Final   06/17/2023 2+ Corynebacterium striatum (A)  Final     Comment:     Identification obtained by MALDI-TOF mass spectrometry research use only database. Test characteristics determined and verified by the Infectious Diseases Diagnostic Laboratory.   06/17/2023 1+ Klebsiella pneumoniae (A)  Final   06/17/2023 No anaerobic organisms isolated after 8 days  Preliminary   06/17/2023 Positive on the 1st day of incubation (A)  Final   06/17/2023 Staphylococcus aureus MRSA (AA)  Final     Comment:     1 of 2 bottlesSusceptibilities done on previous cultures   06/17/2023 Positive on the 1st day of incubation (A)  Final   06/17/2023 Staphylococcus aureus MRSA (AA)  Final     Comment:     1 of 2 bottlesSusceptibilities done on previous cultures   06/17/2023 <10,000 CFU/mL Urogenital marimar  Final   06/16/2023 Positive on the 1st day of incubation (A)  Final   06/16/2023 Staphylococcus aureus MRSA (AA)  Final     Comment:     1 of 2 bottlesSusceptibilities done on previous cultures   06/16/2023 Positive on the 1st day of incubation (A)  Final   06/16/2023 Staphylococcus aureus MRSA (AA)  Final     Comment:     1 of 2 bottlesSusceptibilities done on previous cultures   06/15/2023 Positive on the 1st day of incubation (A)  Final   06/15/2023 Staphylococcus aureus MRSA (AA)  Final     Comment:     2 of 2 bottlesSusceptibilities done on previous cultures   06/15/2023 Positive on the 1st day of incubation (A)  Final   06/15/2023 Staphylococcus aureus MRSA (AA)  Final     Comment:     2 of 2 bottlesSusceptibilities  done on previous cultures   06/14/2023 Positive on the 1st day of incubation (A)  Final   06/14/2023 Staphylococcus aureus MRSA (AA)  Final     Comment:     2 of 2 bottlesSusceptibilities done on previous cultures   06/14/2023 Positive on the 1st day of incubation (A)  Final   06/14/2023 Staphylococcus aureus MRSA (AA)  Final     Comment:     2 of 2 bottles     05/26/2023 No anaerobic organisms isolated  Final   05/26/2023 No anaerobic organisms isolated  Final   05/26/2023 No anaerobic organisms isolated  Final   05/26/2023 No anaerobic organisms isolated  Final   05/26/2023 No Growth  Final   05/26/2023 No Growth  Final   05/26/2023 No Growth  Final   05/26/2023 No Growth  Final   05/26/2023 No anaerobic organisms isolated  Final   05/26/2023 No Growth  Final   04/07/2023 No anaerobic organisms isolated  Final   04/07/2023 No Growth  Final   04/07/2023 No anaerobic organisms isolated  Final   04/07/2023 No Growth  Final   04/07/2023 No anaerobic organisms isolated  Final   04/07/2023 No Growth  Final   04/07/2023 No anaerobic organisms isolated  Final   04/07/2023 No Growth  Final   04/07/2023 No anaerobic organisms isolated  Final   04/07/2023 No Growth  Final   04/07/2023 No anaerobic organisms isolated  Final   04/07/2023 No Growth  Final   11/22/2022 4+ Finegoldia magna (A)  Final     Comment:     Susceptibilities done on previous cultures   11/22/2022 No Growth  Final   11/22/2022 1+ Proteus mirabilis (A)  Final     Comment:     Susceptibilities done on previous cultures   11/22/2022 1+ Finegoldia magna (A)  Final     Comment:     Susceptibilities done on previous cultures   11/22/2022 No Growth  Final   11/22/2022 1+ Proteus mirabilis (A)  Final   11/22/2022 No anaerobic organisms isolated  Final   11/22/2022 No anaerobic organisms isolated  Final   11/22/2022 No anaerobic organisms isolated  Final   11/22/2022 No Growth  Final   11/22/2022 No Growth  Final   11/22/2022 No Growth  Final   11/22/2022 Isolated  in broth only Proteus mirabilis (A)  Final     Comment:     On day 1 of incubationSusceptibilities done on previous cultures   11/22/2022 Isolated in broth only Proteus mirabilis (A)  Final     Comment:     On day 1 of incubationSusceptibilities done on previous cultures   11/22/2022 1+ Proteus mirabilis (A)  Final   11/22/2022 No anaerobic organisms isolated  Final   11/22/2022 No Growth  Final   11/22/2022 1+ Proteus mirabilis (A)  Final     Comment:     Susceptibilities done on previous cultures   11/14/2022 4+ Porphyromonas species (A)  Final     Comment:     Beta Lactamase Positive  Identification obtained by MALDI-TOF mass spectrometry research use only database. Test characteristics determined and verified by the Infectious Diseases Diagnostic Laboratory.   11/14/2022 1+ Finegoldia magna (A)  Corrected   11/14/2022 3+ Proteus mirabilis (A)  Final   11/14/2022 No Growth  Final     Metabolic Studies       Recent Labs   Lab Test 06/26/23  1724 06/26/23  1209 06/26/23  0738 06/26/23  0513 06/26/23  0350 06/25/23  2216 06/25/23  0812 06/25/23  0615 06/24/23  1405 06/24/23  1340 06/24/23  0801 06/24/23  0654 06/23/23  0916 06/23/23  0840 06/22/23  2120 06/22/23  1341 06/22/23  0806 06/22/23  0711 06/16/23  0820 06/16/23  0605 03/21/23  0810 03/20/23  1919 01/10/23  1710 01/10/23  1105   NA  --   --   --  139  --   --   --  139  --  137  --  137  --  137  --  138  --  137   < > 141   < >  --    < >  --    POTASSIUM  --   --   --  4.7  --   --   --  4.6  --   --   --  4.5  --  4.7  --  5.1  --  5.4*   < > 4.2   < >  --    < >  --    CHLORIDE  --   --   --  110*  --   --   --  108*  --   --   --  106  --  105  --  105  --  105   < > 104   < >  --    < >  --    CO2  --   --   --  20*  --   --   --  22  --   --   --  22  --  22  --  23  --  23   < > 24   < >  --    < >  --    ANIONGAP  --   --   --  9  --   --   --  9  --   --   --  9  --  10  --  10  --  9   < > 13   < >  --    < >  --    BUN  --   --   --  42.8*  --    --   --  46.0*  --   --   --  50.2*  --  50.2*  --  45.1*  --  41.9*   < > 27.7*   < >  --    < >  --    CR  --   --   --  1.42*  --   --   --  1.65*  --  1.84*  --  2.05*  --  2.32*  --  2.43*  --  2.22*   < > 1.14   < >  --    < >  --    GFRESTIMATED  --   --   --  53*  --   --   --  44*  --   --   --  34*  --  29*  --  28*  --  31*   < > 69   < >  --    < >  --    * 127* 135* 134* 144* 181*   < > 142*   < >  --    < > 166*   < > 132*   < > 155*   < > 189*   < > 171*   < >  --    < >  --    A1C  --   --   --   --   --   --   --   --   --   --   --   --   --   --   --   --   --   --   --  5.6  --   --    < >  --    MAIKOL  --   --   --  7.6*  --   --   --  7.5*  --   --   --  7.2*  --  7.2*  --  7.3*  --  7.1*   < > 8.3*   < >  --    < >  --    PHOS  --   --   --  3.7  --   --   --  3.9  --   --   --  4.2  --  4.7*  --  5.1*  --  5.1*   < >  --   --   --   --   --    MAG  --   --   --   --   --   --   --  1.8  --   --   --  1.7  --  1.9  --   --   --  1.6*   < > 1.7   < >  --   --   --    LACT  --   --   --   --   --   --   --   --   --   --   --   --   --   --   --   --   --   --   --   --   --  1.3  --  0.9    < > = values in this interval not displayed.     Hepatic Studies    Recent Labs   Lab Test 06/26/23  0513 06/25/23  0615 06/24/23  0654 06/23/23  0840 06/22/23  1341 06/22/23  0711 06/20/23  0704 06/19/23  0915 06/16/23  0605 06/14/23  0651 05/31/23  0630 05/26/23  0753   BILITOTAL  --   --   --   --   --   --  0.2 0.2 0.2 0.2 0.2 0.3   ALKPHOS  --   --   --   --   --   --  93 94 114 110 74 92   ALBUMIN 2.2* 2.3* 2.4* 2.4* 2.4* 2.2* 2.3* 2.6* 2.8* 2.8* 3.2* 3.7   AST  --   --   --   --   --   --  14 19 16 16 14 10   ALT  --   --   --   --   --   --  14 12 10 8 6* 6*     Hematology Studies      Recent Labs   Lab Test 06/26/23  0513 06/25/23  0615 06/24/23  0654 06/23/23  0840 06/22/23  1926 06/22/23  1341 06/22/23  0711 06/21/23  0800   WBC  --  13.9* 14.9* 14.0*  --  18.1* 17.8* 10.1   HGB 7.6* 6.8*  7.3* 7.5* 7.5* 6.7* 5.3* 7.3*   HCT  --  22.1* 23.0* 23.6*  --  21.4* 17.6* 24.1*   PLT  --  310 290 312  --  367 347 354     Arterial Blood Gas Testing    Recent Labs   Lab Test 06/15/23  1325 11/26/22  0945 11/22/22  1925 11/22/22  1644   PH 7.39  --   --   --    PCO2 44  --   --   --    PO2 83  --   --   --    HCO3 26  --   --   --    O2PER 2 32 40.0 47.0          Latest Ref Rng & Units 6/26/2023     5:13 AM 6/25/2023     6:15 AM 6/24/2023     1:40 PM 6/24/2023     6:54 AM 6/23/2023     8:40 AM   Transplant Immunosuppression Labs   Creat 0.67 - 1.17 mg/dL 1.42  1.65  1.84  2.05  2.32    Urea Nitrogen 8.0 - 23.0 mg/dL 42.8  46.0   50.2  50.2    WBC 4.0 - 11.0 10e3/uL  13.9   14.9  14.0           Imaging:   XR Chest Port 1 View  Result Date: 6/14/2023  IMPRESSION: Negative portable view. If further detail is needed comment consider upright PA and lateral examination when clinical condition permits. TAE MORRIS MD   SYSTEM ID:  [id number]    XR Abdomen 1 View  Result Date: 6/10/2023  IMPRESSION: Nonobstructive bowel gas pattern. Large stool burden. I have personally reviewed the examination and initial interpretation and I agree with the findings. JONNATHAN COLEMAN MD   SYSTEM ID:  [id number]

## 2023-06-25 NOTE — PROGRESS NOTES
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 on 11/22/2022 for scheduled explant of left hip prosthesis, debridement, and reconstruction with antibiotic spacer.  Postop course complicated by profound deconditioning.  He was admitted to TCU initially on 12/23/2022 for physical rehabilitation, however therapies no longer worked with patient after several months of an in bed therapy and his refusals to mobilize.  Underwent biopsy on 4/7/2023 without evidence of infection.  He was transferred back to [facility] for left total hip arthroplasty and reimplantation on 5/26/23 with Dr. Meyers.  Presented again to [place] for persistent MRSA bacteremia on 6/17.     Brief Hospital Summary:   Pt was admitted to continue infectious workup with MRSA bacteremia.  Admission blood cultures were positive, ID and Ortho were consulted. KEILA was obtained on 6/19, which was negative for endocarditis, ortho took for washout/debridement of hip on 6/21. ID recommending 6 weeks abx, patient recovering from HALEY but still well above baseline - may need short tunneled line with IR vs PICC should Cr return to about 1.    Plan for today:   - transfuse 1 unit prbc; creatinine coming down and may be ok for PICC in next couple days (baseline Cr 1.0)      MRSA bacteremia due to possible left hip septic arthritis vs other source Blood cultures: 6/14, 6/15 + for MRSA (4/4), and 6/16 (1/2 GPCs) and 6/17 (2/2) GPCs. - started vancomycin 6/15.   6/17 sinovial fluid cultures with staph and klebsiella. Purulent drainage noted at incision site at TCU, CT left hip concerning for infection (no mention of  joint, mostly anterior in soft tissue) and couldn't exclude abscess/inf  - Continue vancomycin, added rocephin 6/21 and changed to cefepime 6/23; ID recommends 6 weeks IV, access issues discussed above, needs weekly vanc level, cbcd, cmp, crp while on IV abx  - blood cultures negative 6/19 and 6/20 (last + was 6/17), trend CRP q48h (way down since initiation of treatment and washout)    Hypoxemic Respiratory Failure due to immobility, surgery - resolved (sena)  - patient with fluctuating oxygen requirement but off O2 since 6/23  - continue to follow; encourage IS    Acute blood loss anemia following surgery, complicated by use of blood thinner  - hgb holding steady; got 3 units day after surgery did joint washout for acute blood loss anemia, ordered another unit this morning for hgb 6.8 g/dL  - watch for bleeding; hold apixaban if need be - had unprovoked dvt in 2018 and would be on eliquis for life probably, at really high risk of recurrent DVT given recent procedures and illness/immobility    Acute Kidney Injury - multifactorial, see below  - baseline creatinine 1.0 or 1.1  - etiology: suspect combo of sepsis/inflammation, hypovolemia, blood loss anemia following surgery, vancomycin?  - volume (hypovolemia/congestion): euvolemic but tough to assess based on habitus  - nephrotoxins: no  - diabetes: yes; last A1C < 6, though  - sepsis: lil bit, resolved now  - anemia: yes  - cardiac performance: normal based on KEILA from 6/19  - inflammation: from surgery and infection  - creatinine downtrending; neph ordered C3 and 4, pending    S/p left hip explantation of left hip antibiotic spacer and revision of left total hip arthroplasty (5/26/2023)  - ortho performed washout 6/21; drain to come out in 2 weeks  -Activity: 50% PWB LLE with posterior hip precautions [id number]  -AFO ordered for foot drop but patient refusing to use  -PT/OT  -Knee to be kept even in a foam leg elevator to relax sciatic nerve  -Pain: Scheduled  robaxin 4 times a day, APAP PRN                 -oxycodone  to 5-7.5 mg discontinued                 -tramadol 25-50 mg every 6 hours PRN     Hx of unprovoked DVT in 2018   - on eliquis 5 mg bid     Altered mental status due to Delirium from illness/hospitalization  resolved  - delirium precuations      Type 2 diabetes mellitus  - Last hemoglobin A1c 5.6% on 6/16/2023.  On glipizide, metformin, Actos, and Victoza prior to hospitalizations and surgery.   - continue ISS    Constipation  -Continue Colace 100 mg twice daily  -Continue senna 1 tablet twice daily  -Continue MiraLAX daily     Hypomagnesemia  -monitor and replete      Severe malnutrition in the context of acute on chronic illness  Goal for patient is to consume % of meals TID. Goal is 7428-0429 kcals/daily. Protein needs- 100-126 grams/daily.   -RD consult and appreciate recs  -regular diet  -Weekly weights, daily I/O     Adjustment disorder with depressed mood  Prolonged grief disorder  Patient has been with depressed mood in setting of ongoing health issues, as evidenced by lack of motivation to work with therapies during both TCU admissions.  Past history of passive suicidal statements. Psychiatry saw patient during hospital admission on 5/31 due to lack of motivation with therapies. Patient declined interest in starting any psychiatric medications. Was willing to engage with health psychology.   -Outpatient health psychology consult  -Continue to monitor for willingness to start antidepressant therapy     Stable and Resolved Issues:  Hyperlipidemia-continue PTA Lipitor 40 mg daily  JAIR-CPAP when sleeping       Diet: Regular Diet Adult  Snacks/Supplements Adult: Other; Chocholate or Butter Pecan Glucerna with bkf trays daily; With Meals    DVT Prophylaxis: DOAC  Tran Catheter: Not present  Lines: None     Cardiac Monitoring: None  Code Status: Full Code      Clinically Significant Risk Factors              # Hypoalbuminemia: Lowest albumin = 2.2  "g/dL at 6/22/2023  7:11 AM, will monitor as appropriate            # Obesity: Estimated body mass index is 36.45 kg/m  as calculated from the following:    Height as of 5/26/23: 1.778 m (5' 10\").    Weight as of 6/16/23: 115.2 kg (254 lb).   # Severe Malnutrition: based on nutrition assessment         Disposition Plan     Expected Discharge Date: 06/26/2023                  Jem Malloy, DO  Hospitalist Service, GOLD TEAM 21  [facility]  Securely message with [employer] (more info)  Text page via [employer] Paging/Directory   See signed in provider for up to date coverage information  ______________________________________________________________________    Interval History   Low hgb this morning, ordered unit of blood  Patient's got some complaints about getting poked for blood draws and not being able to get the lights off  Asks about plan moving forward, updated on status of long term access for delivery of antibiotics    Physical Exam   Vital Signs: Temp: (!) 95.5  F (35.3  C) Temp src: Oral BP: 136/57 Pulse: 68   Resp: 12 SpO2: 95 % O2 Device: None (Room air)    Weight: 0 lbs 0 oz    Lying down in no distress  Awake, alert  Speech clear, coherent  RR and wob normal    Medical Decision Making       45 MINUTES SPENT BY ME on the date of service doing chart review, history, exam, documentation & further activities per the note.      Data     I have personally reviewed the following data over the past 24 hrs:    13.9 (H)  \   6.8 (LL)   / 310     139 108 (H) 46.0 (H) /  142 (H)   4.6 22 1.65 (H) \       ALT: N/A AST: N/A AP: N/A TBILI: N/A   ALB: 2.3 (L) TOT PROTEIN: N/A LIPASE: N/A       Imaging results reviewed over the past 24 hrs:   No results found for this or any previous visit (from the past 24 hour(s)).  "

## 2023-06-26 NOTE — PLAN OF CARE
VS: /44 (BP Location: Right arm, Patient Position: Semi-Fuentes's)   Pulse 68   Temp 97.7  F (36.5  C) (Oral)   Resp 16   SpO2 98%     O2: Stable on room air>90% denies SOB and chest pain   Output: Using Bedside urinal    Last BM: 6/24/23    Activity: Bedfast. Refused repositioning.   Assist of 2-3 with cares Lift device to transfer.   Skin: Left hip surgical incision   Left foot heel pressure ulcer. Mepilex dressing  Red blanchable -skin barrier applied    Pain: Managed with tramadol x 2 and tylenol and Robaxin   CMS: AXO x4.   Baseline neuropathy to lower extremities.    Dressing: L hip incision  CDI  GUERRERO drain site still leaking - changed dressing   Diet: Reg diet.  BG checks and insulin poor diet   LDA: Right PIV-saline locked  GUERRERO drain- output 30   Equipment: PT Belongings, IV pole and pump   Plan: Count with Plan of care. Antibiotic therapy   Pending TCU discharge    Additional Info: Patient is willing to get out of bed. Physical therapy needs to evaluate patient .   RN Managed Potassium.   HGH AM Draw is 7.6

## 2023-06-26 NOTE — PROGRESS NOTES
VS: /55 (BP Location: Left arm)   Pulse 72   Temp 97.8  F (36.6  C) (Oral)   Resp 16   SpO2 97%    O2: RA   Output: Voids in urinal   Last BM: 6/26 a very large loose stool   Activity: Not OOB, bed bound   Skin: L hip area incision, drain on L side, multiple bruises   Pain: intermittent   CMS: BLE numbness at baseline   Dressing: L hip area   Diet: reg   LDA: PIV in R wrist area   Equipment: none   Plan: Monitor   Additional Info:      Plan is for CVC tunnel placement 6/27  NPO at 0400    Hold stool softeners, pt having diarrhea    To move him from side to side is a 2-3 person job.  Don is very deconditioned.

## 2023-06-26 NOTE — PROGRESS NOTES
Orthopedic Surgery Progress Note: 06/26/2023    Subjective:   No acute events overnight. Transfused 1U pRBCs on 6/25. Nephrology following and notes improving Creatinine. More drainage from drain site. Dressings fairly dry. Pain well-controlled. Voiding spontaneously. LBM 6/26. No new concerns or complaints. Not moving much. Very deconditioned, per RN.    Objective:   /44 (BP Location: Right arm, Patient Position: Semi-Fuentes's)   Pulse 68   Temp 97.7  F (36.5  C) (Oral)   Resp 16   SpO2 98%   No intake/output data recorded.  General: NAD. Resting comfortably in bed.  Respiratory: Breathing comfortably on RA.  Drain Output:  Output by Drain (mL) 06/24/23 0700 - 06/24/23 1459 06/24/23 1500 - 06/24/23 2259 06/24/23 2300 - 06/25/23 0659 06/25/23 0700 - 06/25/23 1459 06/25/23 1500 - 06/25/23 2259 06/25/23 2300 - 06/26/23 0615   Closed/Suction Drain Left Thigh         Closed/Suction Drain Anterior;Left Thigh Bulb 15 French  10 30 10     Musculoskeletal: Focused exam of the left lower extremity: Drain in place. Sanguinous drainage on dressings. Fires GSC, TA, FHL, and EHL minimally. Incredibly sensitive to touch on feet. Overall decreased sensation distally. Brisk capillary refill.     Laboratory Data:  Lab Results   Component Value Date    WBC 13.9 (H) 06/25/2023    HGB 7.6 (L) 06/26/2023     06/25/2023      Intraoperative cultures 6/21/2023: MRSA, C Acnes (new 6/25)    Assessment & Plan:   Waldo Bustos is a 72 year old male status post left revision total hip arthroplasty on 5/26/2023 with Dr. Meyers now presenting with left hip prosthetic joint infection status post left hip irrigation and debridement on 6/21/2023 with Dr. Meyers.    CRP downtrending but overall quite deconditioned.   C acnes now in cultures. Appreciate ID recs.    Goals for Today:  - Pain control and mobilization - appreciate therapies recs  - OK for nursing to remove and replace absorptive dressing over drain site as needed  -  Discharge planning    Ortho Plan:  - Activity: Up with assist until independent. Posterior hip precautions x 3 months (no flexion beyond 90 degrees, no adduction beyond midline, and no internal rotation beyond midline).  - Weightbearing Status: WBAT LLE.  - Pain Management: Transition from IV to PO as tolerated.  - Antibiotics: Vancomycin and cefepime per ID.  - Diet: OK for a diet from an orthopedic perspective.  - DVT Prophylaxis: SCDs and PTA apixaban 5 mg BID.  - Imaging: None pending.  - Labs: Labs PRN.  - Bracing/Splinting: Abduction pillow while in bed.  - Dressings: Keep dressing C/D/I x 2 weeks.  - Drains: GUERRERO drain in place (remove in 2 weeks).  - Tran Catheter: None.   - Physical Therapy/Occupational Therapy: Evaluation and treatment.  - Cultures: Intraoperative cultures 6/21/2023.  - Follow-up: Clinic with Dr. Meyers's team in 2 weeks without radiographs.    - Disposition: Pending progress with therapies, pain control on orals, and medical stability; anticipate discharge to TCU with timing TBD.    Orthopedic Surgery staff for this patient is Dr. Meyers.    ------------------------------------------------------------------------------------------    Hannah Starr MD, PGY-4  Orthopedics

## 2023-06-26 NOTE — PHARMACY-VANCOMYCIN DOSING SERVICE
Pharmacy Vancomycin Note  Date of Service 2023  Patient's  1950   72 year old, male    Indication: MRSA Bacteremia, vancomycin MARCOS </= 0.5   Day of Therapy: started 6/15/23  Current vancomycin regimen:  500 mg IV q24h  Current vancomycin monitoring method: AUC  Current vancomycin therapeutic monitoring goal: 400-600 mg*h/L    Insight[employer] Prediction of Current Vancomycin Regimen  Regimen: 500 mg IV every 24 hours.  Exposure target: AUC24 (range)400-600 mg/L.hr   AUC24,ss: 336 mg/L.hr  Probability of AUC24 > 400: 5 %  Ctrough,ss: 12.1 mg/L  Probability of Ctrough,ss > 20: 0 %  Probability of nephrotoxicity (Lodise HAJA ): 7 %    Current estimated CrCl = Estimated Creatinine Clearance: 60.7 mL/min (A) (based on SCr of 1.42 mg/dL (H)).    Creatinine for last 3 days  2023:  8:40 AM Creatinine 2.32 mg/dL  2023:  6:54 AM Creatinine 2.05 mg/dL;  1:40 PM Creatinine 1.84 mg/dL  2023:  6:15 AM Creatinine 1.65 mg/dL  2023:  5:13 AM Creatinine 1.42 mg/dL    Recent Vancomycin Levels (past 3 days)  2023:  8:40 AM Vancomycin 31.4 ug/mL  2023:  1:39 PM Vancomycin 29.9 ug/mL  2023:  5:13 AM Vancomycin 25.1 ug/mL    Vancomycin IV Administrations (past 72 hours)                   vancomycin (VANCOCIN) 500 mg vial to attach to  mL bag (mg) 500 mg New Bag 23 1539     500 mg New Bag 23 1550     500 mg New Bag 23 1525                Nephrotoxins and other renal medications (From now, onward)    Start     Dose/Rate Route Frequency Ordered Stop    23 1500  vancomycin (VANCOCIN) 750 mg in sodium chloride 0.9 % 250 mL intermittent infusion         750 mg  over 90 Minutes Intravenous EVERY 24 HOURS 23 0748      23 1400  sodium chloride 0.9 % 18.4 mL with ropivacaine (NAROPIN) 5 MG/ mg, EPINEPHrine (ADRENALIN) 600 mcg, ketorolac (TORADOL) 30 mg          INTRA-ARTICULAR ONCE 23 1354               Contrast Orders - past 72 hours (72h ago,  onward)    None          Interpretation of levels and current regimen:  Vancomycin level is reflective of AUC less than 400    Has serum creatinine changed greater than 50% in last 72 hours: No, however improved significantly from 72 hours ago     Urine output:  unable to determine    Renal Function: Improving    InsightRX Prediction of Planned New Vancomycin Regimen  Regimen: 750 mg IV every 24 hours.  Exposure target: AUC24 (range)400-600 mg/L.hr   AUC24,ss: 484 mg/L.hr  Probability of AUC24 > 400: 98 %  Ctrough,ss: 17.4 mg/L  Probability of Ctrough,ss > 20: 10 %  Probability of nephrotoxicity (Lodise HAJA 2009): 13 %    Plan:  1. Increase Dose to 750 mg IV every 24 hours.   2. Vancomycin monitoring method: AUC  3. Vancomycin therapeutic monitoring goal: 400-600 mg*h/L  4. Pharmacy will check vancomycin levels as appropriate in 1-3 Days.  5. Serum creatinine levels will be ordered a minimum of twice weekly.    OTONIEL MENDEZ [facility]

## 2023-06-26 NOTE — PROGRESS NOTES
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  Postop course complicated by profound deconditioning.  He was admitted to TCU initially on 12/23/2022 for physical rehabilitation, however therapies no longer worked with patient after several months of an in bed therapy and his refusals to mobilize.  Underwent biopsy on 4/7/2023 without evidence of infection.  He was transferred back to [facility] for left total hip arthroplasty and reimplantation on 5/26/23 with Dr. Meyers.  Presented again to [facility] for persistent MRSA bacteremia on 6/17.     Brief Hospital Summary:   Pt was admitted to continue infectious workup with MRSA bacteremia.  Admission blood cultures were positive, ID and Ortho were consulted. KEILA was obtained on 6/19, which was negative for endocarditis, ortho took for washout/debridement of hip on 6/21. ID recommending 6 weeks abx (6/21-8/1), patient recovering from HALEY, but given history of HALEY's and need for long term vanc plus comorbids making declining kidney function in the future probable nephrology doesn't feel comfortable with patient getting a PICC -- spoke with IR today about a tunneled line, consult in.    Plan for today:  - plan to discontinue cefepime at discharge and start on oral cipro 750 mg bid (will run by pharmacy given reduced eGFR), continue IV vanc  - add on iron and folic acid supplementation to begin tomorrow    - spoke with IR about tunneled line, they will place tomorrow or Wednesday as able    MRSA bacteremia due  to possible left hip septic arthritis vs other source Blood cultures: 6/14, 6/15 + for MRSA (4/4), and 6/16 (1/2 GPCs) and 6/17 (2/2) GPCs. - started vancomycin 6/15.   6/17 sinovial fluid cultures with staph and klebsiella. Purulent drainage noted at incision site at TCU, CT left hip concerning for infection (no mention of joint, mostly anterior in soft tissue) and couldn't exclude abscess/inf  - Continue vancomycin, added rocephin 6/21 and changed to cefepime 6/23; ID recommends 6 weeks IV vanc (last day 8/1/23) and can transition to oral cipro 750 mg BID at discharge, needs weekly vanc level, cbcd, cmp, crp while on IV abx, regular ECG monitoring of QTc on high dose cipro --> follow up outpatient with ID prior to discontinuing antibiotics (needs appt end of July)  - blood cultures negative 6/19 and 6/20 (last + was 6/17), trend CRP q48h (way down since initiation of treatment and washout)  - intraop cultures are growing Staph, Pseudomonas, and C acnes    Bilateral LE edema due to volume overload from HALEY and iatrogenic from blood transfusions  - start lasix 60 mg PO BID per nephrology    Hypoxemic Respiratory Failure due to immobility, surgery - resolved (sena)  - patient with fluctuating oxygen requirement but off O2 since 6/23  - continue to follow; encourage IS    Acute blood loss anemia following surgery, complicated by use of blood thinner  - hgb 7.6 this morning; got 3 units day after surgery did joint washout (6/22) for acute blood loss anemia, 1 unit 6/25  - watch for bleeding; hold apixaban if need be - had unprovoked dvt in 2018 and would be on eliquis for life probably, at really high risk of recurrent DVT given recent procedures and illness/immobility  - I added on some iron studies and B12 level today; interpret with caution given transfusions; probably wouldn't hurt to supplement iron and folic acid given rapid turnover and need for transfusions, ordered to start tomorrow    Acute Kidney Injury -  multifactorial, see below  - baseline creatinine 1.0 or 1.1  - etiology: suspect combo of sepsis/inflammation, hypovolemia, blood loss anemia following surgery, vancomycin?  - volume (hypovolemia/congestion): euvolemic but tough to assess based on habitus  - nephrotoxins: vancomycin  - diabetes: yes; last A1C < 6, though  - sepsis: lil bit, resolved now  - anemia: yes, ongoing  - cardiac performance: normal based on KEILA from 6/19  - inflammation: from surgery and infection  - creatinine downtrending; neph ordered C3 and 4, pending still (6/23)    S/p left hip explantation of left hip antibiotic spacer and revision of left total hip arthroplasty (5/26/2023)  - ortho performed washout 6/21; drain to come out in 2 weeks  -Activity: 50% PWB LLE with posterior hip precautions [id number]  -AFO ordered for foot drop but patient refusing to use  -PT/OT  -Knee to be kept even in a foam leg elevator to relax sciatic nerve  -Pain: Scheduled robaxin 4 times a day, APAP PRN                 -oxycodone  to 5-7.5 mg discontinued                 -tramadol 25-50 mg every 6 hours PRN     Hx of unprovoked DVT in 2018   - on eliquis 5 mg bid     Altered mental status due to Delirium from illness/hospitalization  resolved  - delirium precuations      Type 2 diabetes mellitus  - Last hemoglobin A1c 5.6% on 6/16/2023.  On glipizide, metformin, Actos, and Victoza prior to hospitalizations and surgery.   - continue ISS    Constipation  -Continue Colace 100 mg twice daily  -Continue senna 1 tablet twice daily  -Continue MiraLAX daily     Hypomagnesemia  -monitor and replete      Severe malnutrition in the context of acute on chronic illness  Goal for patient is to consume % of meals TID. Goal is 5684-3245 kcals/daily. Protein needs- 100-126 grams/daily.   -RD consult and appreciate recs  -regular diet  -Weekly weights, daily I/O     Adjustment disorder with depressed mood  Prolonged grief disorder  Patient has been with depressed mood in  setting of ongoing health issues, as evidenced by lack of motivation to work with therapies during both TCU admissions.  Past history of passive suicidal statements. Psychiatry saw patient during hospital admission on 5/31 due to lack of motivation with therapies. Patient declined interest in starting any psychiatric medications. Was willing to engage with health psychology.   -Outpatient health psychology consult  -Continue to monitor for willingness to start antidepressant therapy     Stable and Resolved Issues:  Hyperlipidemia-continue PTA Lipitor 40 mg daily  JAIR-CPAP when sleeping       Diet: Regular Diet Adult  Snacks/Supplements Adult: Other; Chocholate or Butter Pecan Glucerna with bkf trays daily; With Meals    DVT Prophylaxis: DOAC  Tran Catheter: Not present  Lines: None     Cardiac Monitoring: None  Code Status: Full Code      Clinically Significant Risk Factors              # Hypoalbuminemia: Lowest albumin = 2.2 g/dL at 6/26/2023  5:13 AM, will monitor as appropriate             # Severe Malnutrition: based on nutrition assessment         Disposition Plan     Expected Discharge Date: 06/26/2023                  Jem Malloy DO  Hospitalist Service, GOLD TEAM 21  [facility]  Securely message with [employer] (more info)  Text page via [employer] Paging/Directory   See signed in provider for up to date coverage information  ______________________________________________________________________    Interval History   Had a bowel movement finally  Patient somewhat frustrated with vascular access situation but resigned to get central line placed  Has questions about how his hip infection started in the first place    Physical Exam   Vital Signs: Temp: 97.8  F (36.6  C) Temp src: Oral BP: 125/55 Pulse: 72   Resp: 16 SpO2: 97 % O2 Device: None (Room air) Oxygen Delivery: 1 LPM  Weight: 0 lbs 0 oz    Lying down in no distress  Awake, alert  Speech clear, coherent  RR  and wob normal  Significant LE edema    Medical Decision Making       52 MINUTES SPENT BY ME on the date of service doing chart review, history, exam, documentation & further activities per the note.      Data     I have personally reviewed the following data over the past 24 hrs:    N/A  \   7.6 (L)   / N/A     139 110 (H) 42.8 (H) /  135 (H)   4.7 20 (L) 1.42 (H) \       ALT: N/A AST: N/A AP: N/A TBILI: N/A   ALB: 2.2 (L) TOT PROTEIN: N/A LIPASE: N/A       Procal: N/A CRP: 15.75 (H) Lactic Acid: N/A         Imaging results reviewed over the past 24 hrs:   No results found for this or any previous visit (from the past 24 hour(s)).

## 2023-06-26 NOTE — CONSULTS
Interventional Radiology  [facility] Consult Note  06/26/23   11:31 AM    Consult Requested: tunneled single lumen CVC for antibiotics    Recommendations/Plan:  Patient is on IR schedule tomorrow afternoon for a tunneled line placement, single lumen likely right IJ.   Labs WNL for procedure.  Orders entered for procedure, NPO status, and pre procedure IV antibiotics.   Medications to be held include: None  Consent will be done prior to procedure.     Please contact the IR charge RN at [phone] for estimated time of procedure.     Recommendations were reviewed with Gama    This is a 72 year old male with past medical history of prosthetic joint injection, HALEY, needs 6 weeks of IV antibiotics, not a candidate for PICC per nephrology.    Pertinent Imaging Reviewed: CXR 6/14 shows no lines    Expected date of discharge:  Wednesday    Vitals:   /55 (BP Location: Left arm)   Pulse 72   Temp 97.8  F (36.6  C) (Oral)   Resp 16   SpO2 97%     Pertinent Labs:   Lab Results   Component Value Date    WBC 13.9 (H) 06/25/2023    WBC 14.9 (H) 06/24/2023    WBC 14.0 (H) 06/23/2023     Lab Results   Component Value Date    HGB 7.6 06/26/2023    HGB 6.8 06/25/2023    HGB 7.3 06/24/2023     Lab Results   Component Value Date     06/25/2023     06/24/2023     06/23/2023     No results found for: INR, PTT  Lab Results   Component Value Date    POTASSIUM 4.7 06/26/2023    POTASSIUM 4.2 02/06/2023        COVID-19 Antibody Results, Testing for Immunity         No data to display            COVID-19 PCR Results        4/2/2022    01:16 4/3/2022    09:22 4/6/2022    06:34 11/18/2022    13:09 12/23/2022    13:38   COVID-19 PCR Results   COVID-19 Virus by PCR (External Result) Negative     Negative     Negative         SARS CoV2 PCR    Negative  Negative            12/27/2022    14:30 6/17/2023    08:36   COVID-19 PCR Results   COVID-19 Virus by PCR (External Result)     SARS CoV2 PCR Negative   Negative     Details          This result is from an external source.             Que Modi PA-C  Interventional Radiology  Pager: [phone]

## 2023-06-26 NOTE — PROGRESS NOTES
Patient stated that he has not been out of bed since admission and he would like to be Evaluated by Physical therapy to work on how he can be getting out of bed frequently to use commode or transfer to the chair.      Will pass it on to the Day team and physical therapy.

## 2023-06-26 NOTE — PROGRESS NOTES
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 Nephrology consulted for HALEY.    HALEY stage II, now improving  Pt with baseline creatinine of 0.9-1.1, had creatinine trend on 6/20 likely multifactorial in setting of acute blood loss anemia, infection,vancomycin toxicity, obstruction and or hemodynamic changes. Peaked at 2.4 on 6/22 but down trending for past 2 days. Unclear if all his urine is being captured as his UOP charted 50 ml since mid night. Seems like pt is in renal recovery and hoping continue to improve provided no other injury.  - COntinued improvement in Cr, almost back to baseline.     - continue bladder scans to rule out acute obstruction   - strict I and O, daily weights   - continue to avoid PICC line placement for next couple days and if his renal function continue to improve, will reconsider PICC line placement. If mid line is available, preferred mid line.  - Severe dependent edema -> suggest Furosemide 60 mg po bid.      Acute blood loss anemia  Acute on chronic anemia   Improved and stabilized post transfusion. Continue to monitor and transfuse as needed per primary team.    NEPHROLOGY WILL SIGN OFF.  PLEASE CALL WITH QUESTIONS    Recommendations were communicated to primary team via this note and in person.    Kristopher Rodriguez MD  Division of Nephrology and Hypertension  Pager: [phone]  June 26, 2023  9:43 AM      Interval History :   Nursing and provider notes from last 24 hours reviewed.  In the last 24 hours Waldo Bustos been hemodynamically stable.  No acute events    Review of Systems:   I reviewed the following systems:  GI: no change in appetite. no nausea or vomiting or diarrhea.   Neuro:  no confusion  Constitutional:  no fever or chills  CV: no dyspnea or  edema.  no chest pain.    Physical Exam:   I/O last 3 completed shifts:  In: 600   Out: 415 [Urine:375; Drains:40]   /55 (BP Location: Left arm)   Pulse 72   Temp 97.8  F (36.6  C) (Oral)   Resp 16   SpO2 97%      General : Pt awake, not in acute distress   Lungs : clear to lateral exam  Cardiac : RRR.  No rub/gallop heard  Abdomen : Soft/ND/NT  LE : 3+ dependent Edema   No billy    Labs:   All labs reviewed by me  Electrolytes/Renal -   Recent Labs   Lab Test 06/26/23  0738 06/26/23  0513 06/26/23  0350 06/25/23  0812 06/25/23  0615 06/24/23  1405 06/24/23  1340 06/24/23  0801 06/24/23  0654 06/23/23  0916 06/23/23  0840   NA  --  139  --   --  139  --  137  --  137  --  137   POTASSIUM  --  4.7  --   --  4.6  --   --   --  4.5  --  4.7   CHLORIDE  --  110*  --   --  108*  --   --   --  106  --  105   CO2  --  20*  --   --  22  --   --   --  22  --  22   BUN  --  42.8*  --   --  46.0*  --   --   --  50.2*  --  50.2*   CR  --  1.42*  --   --  1.65*  --  1.84*  --  2.05*  --  2.32*   * 134* 144*   < > 142*   < >  --    < > 166*   < > 132*   MAIKOL  --  7.6*  --   --  7.5*  --   --   --  7.2*  --  7.2*   MAG  --   --   --   --  1.8  --   --   --  1.7  --  1.9   PHOS  --  3.7  --   --  3.9  --   --   --  4.2  --  4.7*    < > = values in this interval not displayed.       CBC -   Recent Labs   Lab Test 06/26/23  0513 06/25/23  0615 06/24/23  0654 06/23/23  0840   WBC  --  13.9* 14.9* 14.0*   HGB 7.6* 6.8* 7.3* 7.5*   PLT  --  310 290 312       LFTs -   Recent Labs   Lab Test 06/26/23  0513 06/25/23  0615 06/24/23  0654 06/22/23  0711 06/20/23  0704 06/19/23  0915 06/16/23  0605   ALKPHOS  --   --   --   --  93 94 114   BILITOTAL  --   --   --   --  0.2 0.2 0.2   ALT  --   --   --   --  14 12 10   AST  --   --   --   --  14 19 16   PROTTOTAL  --   --   --   --  5.9* 6.2* 6.2*   ALBUMIN 2.2* 2.3* 2.4*   < > 2.3* 2.6* 2.8*    < > = values in this interval not displayed.     Current Medications:    apixaban  ANTICOAGULANT  5 mg Oral BID     atorvastatin  40 mg Oral Daily     ceFEPIme  2 g Intravenous Q12H     [START ON 6/27/2023] ferrous sulfate  325 mg Oral Every Other Day     [START ON 6/27/2023] folic acid  1 mg Oral Daily     insulin aspart  1-7 Units Subcutaneous TID AC     insulin aspart  1-5 Units Subcutaneous At Bedtime     lactobacillus rhamnosus (GG)  1 capsule Oral BID     methocarbamol  500 mg Oral 4x Daily     miconazole   Topical BID     multivitamin w/minerals  1 tablet Oral Daily     polyethylene glycol  17 g Oral BID     senna-docusate  1 tablet Oral BID     sodium chloride (PF)  10 mL Intracatheter Q8H     sodium chloride 0.9 % 18.4 mL with ropivacaine (NAROPIN) 5 MG/ mg, EPINEPHrine (ADRENALIN) 600 mcg, ketorolac (TORADOL) 30 mg   INTRA-ARTICULAR Once     vancomycin  750 mg Intravenous Q24H       Kristopher Rodriguez MD

## 2023-06-27 NOTE — PLAN OF CARE
"Goal Outcome Evaluation:    VS /46 (BP Location: Left arm)   Pulse 66   Temp 96.9  F (36.1  C) (Oral)   Resp 16   SpO2 97%      O2: SpO2 > 95% and stable on RA. LS clear and equal bilaterally. Denies chest pain and SOB.    Output: Voids spontaneously without difficulty using beside urinal.   Last BM: 6/27/2023 pt had large Bm loose denies abdominal discomfort. BS active / passing flatus.  Pt c/o burning at the buttocks. Pt was encourage to turn and reposition pt refused. \" It will not help me\"  Education was done & activity was encouraged.    Activity: Not OOB this shift. Ax3 or more with bed care.    Skin: Pt declined full skin assessment. Visible skin intact except, L hip surgical incision.   Pain: Pain can be managed with scheduled Robaxin, PRN Tylenol & Tramadol (50mg).   CMS: Intact, AOx4. BLE numbness.   Dressing: L hip surgical incision.   Diet: Regular diet. Denies nausea/vomiting.    LDA: R PIV SL. L hip GUERRERO drain with output of : 20 mL   Equipment: IV pole, personal belongings,    Plan:  Continue with plan of care. Call light within reach, pt able to make needs known.    Additional Info: Pt reported that his phone was taken from him before entering the IR procedure room. Writer called to ask about Pt phone. IR said they will look into the missing phone and called back. Writer did not receive any call regarding pt phone. Will pass on to incoming nurse to follow up regarding pt phone tomorrow.      "

## 2023-06-27 NOTE — PROCEDURES
[facility]    Procedure: Single lumen tunneled line placement    Date/Time: 6/27/2023 1:59 PM    Performed by: Que Modi PA-C  Authorized by: Que Modi PA-C      UNIVERSAL PROTOCOL   Site Marked: No  Prior Images Obtained and Reviewed:  Yes  Required items: Required blood products, implants, devices and special equipment available    Patient identity confirmed:  Verbally with patient, arm band, provided demographic data and hospital-assigned identification number  Patient was reevaluated immediately before administering moderate or deep sedation or anesthesia  Confirmation Checklist:  Patient's identity using two indicators, relevant allergies, procedure was appropriate and matched the consent or emergent situation and correct equipment/implants were available  Time out: Immediately prior to the procedure a time out was called    Universal Protocol: the Joint Commission Universal Protocol was followed    Preparation: Patient was prepped and draped in usual sterile fashion       ANESTHESIA    Anesthesia: Local infiltration  Local Anesthetic:  Lidocaine 1% without epinephrine  Anesthetic Total (mL):  5      SEDATION  Patient Sedated: Yes    Sedation Type:  Anxiolysis  Sedation:  Midazolam  Vital signs: Vital signs monitored during sedation    Fluoroscopy Time: 1 minute(s)  See dictated procedure note for full details.  Findings: 1 mg Versed    Specimens: none    Complications: None    Condition: Stable    Plan: 1 hour monitored recovery.      PROCEDURE  Describe Procedure: 5 Fr 24 cm single lumen tunneled CVC via right IJ with tip in SVC. Functions well, heparin locked and ready for use.  Patient Tolerance:  Patient tolerated the procedure well with no immediate complications  Length of time physician/provider present for 1:1 monitoring during sedation: 0

## 2023-06-27 NOTE — PROGRESS NOTES
"Care Management Follow Up    Length of Stay (days): 10    Expected Discharge Date: TBD    BARRIERS TO DISCHARGE: Pt requiring assist of 3 for mobility and cares; currently doesn't have short-term rehab needs; although pt has skilled need for IV abx, pt does not have Medicare days left;  no funding readily available for LTC.      Concerns to be Addressed: Discharge planning  Patient plan of care discussed at interdisciplinary rounds: Yes    Anticipated Discharge Disposition:  Return to  TCU, pending rehab potential vs LTC and new SNF after MA for LTC application is approved.     Anticipated Discharge Services:  Post acute therapies; 6 week IV abx    Anticipated Discharge DME:  None    Patient/family educated on Medicare website which has current facility and service quality ratings:  Not at this time  Education Provided on the Discharge Plan:  Yes  Patient/Family in Agreement with the Plan:  Yes    Referrals Placed by CM/SW:  None at this time  Private pay costs discussed: MA for LTC eligibility and necessity to secure payment for TCU/LTC previously discussed during pt's 05/26/23 - 06/02/23 hospitalization.    Additional Information:  Per IDT rounds, anticipated pt will be ready to return to  TCU tomorrow, as tunneled line placement will occur today.    ZEB received notification from  Liaison, Leah Diaz, that pt is requiring up to Ax3 with mobility/RN cares;  TCU cannot safely care for pts requiring this level of assist; will continue to follow.     ZEB discussed the above information with PT, who reported that pt is only scheduled for therapies 3x/week d/t lack of participation.    1230: ZEB reached out to pt's nephew, Pollo, re: MA application status, per Leah's request. ZEB left Pollo PEREZ, re-introduced self; reiterated that, per ZEB's last conversation with Pollo on 05/31/23, next steps/recommnedations were \"for the family to work with an Elder Law  to figure out options for getting the assets within " "the guidelines to qualify\"; requested a call back for update on this progress.  Important to note that the above recommendation came from  FC, Catrachito Dooley, who also stated on same date, \"patient cannot qualify for MA LTC until these changes (spending down assets/selling 4 vehicles) have been made, and Financial Counseling cannot advise on this matter.\"   Pt has no Medicare days left. If pt continues to not progress in therapies, rec may change to LTC, which pt does not have readily available funds for and has historically been hesitant to work with nephew to qualify for MA for LTC (SW received notification during last hospital stay [facility] for [facility] application d/t no proof of pt's vehicles being sold; nephew reported that pt was unwilling to sell vehicles).  TCU would likely not accept pt back, as they do not provide LTC. Finding alternative SNF would be a great challenge d/t limited LTC beds available and pt not having the appropriate funding to pay for LTC.    1410:  Leah notified  that she requested Dr. Frazier to place a palliative care consult to address GOC w/ pt, as he has been bedbound since 10/2023 and has not made any functional progress; \"He is currently Ax2-3 for mobility and RN cares. He is also refusing cares. He is definitely not short term rehab appropriate. He ultimately needs LTC. His case has been escalated to  TCU leadership.\"          VANESA Minor, LSW  [facility]   PHONE: [phone]  Pager: [phone]  "

## 2023-06-27 NOTE — PROGRESS NOTES
[facility]    Medicine Progress Note - Hospitalist Service, GOLD TEAM 21    Date of Admission:  6/17/2023    Assessment & Plan   Waldo Bustos is a 72 year old male with a history of type 2 diabetes, hyperlipidemia, chronic back pain, JAIR, DVT/PE on DOAC, and chronic left hip prosthetic joint infection initially admitted to [facility] on 11/22/2022 for scheduled explant of left hip prosthesis, debridement, and reconstruction with antibiotic spacer.  Postop course complicated by profound deconditioning.  He was admitted to TCU initially on 12/23/2022 for physical rehabilitation, however therapies no longer worked with patient after several months of an in bed therapy and his refusals to mobilize.  Underwent biopsy on 4/7/2023 without evidence of infection.  He was transferred back to [facility] for left total hip arthroplasty and reimplantation on 5/26/23 with Dr. Meyers.  Presented again to [facility] for persistent MRSA bacteremia on 6/17.     Brief Hospital Summary:   Pt was admitted to continue infectious workup with MRSA bacteremia.  Admission blood cultures were positive, ID and Ortho were consulted. KEILA was obtained on 6/19, which was negative for endocarditis, ortho took for washout/debridement of hip on 6/21. ID recommending 6 weeks abx (6/21-8/1), patient recovering from HALEY, but given history of HALEY's and need for long term vanc plus comorbids making declining kidney function in the future probable nephrology doesn't feel comfortable with patient getting a PICC -- spoke with IR today about a tunneled line, consult in.    Plan for today:  - plan to discontinue cefepime at discharge and start on oral cipro 750 mg bid (will run by pharmacy given reduced eGFR), continue IV vanc  - add on iron and folic acid supplementation to begin tomorrow    - Tunneled line today    MRSA bacteremia due to possible left hip septic arthritis vs other source Blood  cultures: 6/14, 6/15 + for MRSA (4/4), and 6/16 (1/2 GPCs) and 6/17 (2/2) GPCs. - started vancomycin 6/15.   6/17 sinovial fluid cultures with staph and klebsiella. Purulent drainage noted at incision site at TCU, CT left hip concerning for infection (no mention of joint, mostly anterior in soft tissue) and couldn't exclude abscess/inf  - Continue vancomycin, added rocephin 6/21 and changed to cefepime 6/23; ID recommends 6 weeks IV vanc (last day 8/1/23) and can transition to oral cipro 750 mg BID at discharge, needs weekly vanc level, cbcd, cmp, crp while on IV abx, regular ECG monitoring of QTc on high dose cipro --> follow up outpatient with ID prior to discontinuing antibiotics (needs appt end of July)  - blood cultures negative 6/19 and 6/20 (last + was 6/17), trend CRP q48h (way down since initiation of treatment and washout)  - intraop cultures are growing Staph, Pseudomonas, and C acnes    Bilateral LE edema due to volume overload from HALEY and iatrogenic from blood transfusions  - start lasix 60 mg PO BID per nephrology    Hypoxemic Respiratory Failure due to immobility, surgery - resolved (sena)  - patient with fluctuating oxygen requirement but off O2 since 6/23  - continue to follow; encourage IS    Acute blood loss anemia following surgery, complicated by use of blood thinner  - hgb 7.6 this morning; got 3 units day after surgery did joint washout (6/22) for acute blood loss anemia, 1 unit 6/25  - watch for bleeding; hold apixaban if need be - had unprovoked dvt in 2018 and would be on eliquis for life probably, at really high risk of recurrent DVT given recent procedures and illness/immobility  - I added on some iron studies and B12 level today; interpret with caution given transfusions; probably wouldn't hurt to supplement iron and folic acid given rapid turnover and need for transfusions, ordered to start tomorrow    Acute Kidney Injury - multifactorial, see below  - baseline creatinine 1.0 or 1.1  -  etiology: suspect combo of sepsis/inflammation, hypovolemia, blood loss anemia following surgery, vancomycin?  - volume (hypovolemia/congestion): euvolemic but tough to assess based on habitus  - nephrotoxins: vancomycin  - diabetes: yes; last A1C < 6, though  - sepsis: lil bit, resolved now  - anemia: yes, ongoing  - cardiac performance: normal based on KEILA from 6/19  - inflammation: from surgery and infection  - creatinine downtrending; neph ordered C3 and 4, pending still (6/23)    S/p left hip explantation of left hip antibiotic spacer and revision of left total hip arthroplasty (5/26/2023)  - ortho performed washout 6/21; drain to come out in 2 weeks  -Activity: 50% PWB LLE with posterior hip precautions [id number]  -AFO ordered for foot drop but patient refusing to use  -PT/OT  -Knee to be kept even in a foam leg elevator to relax sciatic nerve  -Pain: Scheduled robaxin 4 times a day, APAP PRN                 -oxycodone  to 5-7.5 mg discontinued                 -tramadol 25-50 mg every 6 hours PRN     Hx of unprovoked DVT in 2018   - on eliquis 5 mg bid     Altered mental status due to Delirium from illness/hospitalization  resolved  - delirium precuations      Type 2 diabetes mellitus  - Last hemoglobin A1c 5.6% on 6/16/2023.  On glipizide, metformin, Actos, and Victoza prior to hospitalizations and surgery.   - continue ISS    Constipation  -Continue Colace 100 mg twice daily  -Continue senna 1 tablet twice daily  -Continue MiraLAX daily     Hypomagnesemia  -monitor and replete      Severe malnutrition in the context of acute on chronic illness  Goal for patient is to consume % of meals TID. Goal is 6267-3296 kcals/daily. Protein needs- 100-126 grams/daily.   -RD consult and appreciate recs  -regular diet  -Weekly weights, daily I/O     Adjustment disorder with depressed mood  Prolonged grief disorder  Patient has been with depressed mood in setting of ongoing health issues, as evidenced by lack of  motivation to work with therapies during both TCU admissions.  Past history of passive suicidal statements. Psychiatry saw patient during hospital admission on 5/31 due to lack of motivation with therapies. Patient declined interest in starting any psychiatric medications. Was willing to engage with health psychology.   -Outpatient health psychology consult  -Continue to monitor for willingness to start antidepressant therapy     Stable and Resolved Issues:  Hyperlipidemia-continue PTA Lipitor 40 mg daily  JAIR-CPAP when sleeping         Diet: Snacks/Supplements Adult: Other; Chocholate or Butter Pecan Glucerna with bkf trays daily; With Meals  NPO per Anesthesia Guidelines for Procedure/Surgery Except for: Meds    DVT Prophylaxis: DOAC  Tran Catheter: Not present  Lines: None     Cardiac Monitoring: None  Code Status: Full Code      Clinically Significant Risk Factors              # Hypoalbuminemia: Lowest albumin = 2.2 g/dL at 6/26/2023  5:13 AM, will monitor as appropriate             # Severe Malnutrition: based on nutrition assessment         Disposition Plan     Expected Discharge Date: 06/28/2023,  3:00 PM      Discharge Comments: [facility]          Caden Frazier MD  Hospitalist Service, GOLD TEAM 21  [facility]  Securely message with iTagged (more info)  Text page via [facility] Paging/Directory   See signed in provider for up to date coverage information  ______________________________________________________________________    Interval History   No acute events overnight. Patient is feeling overwhelmed with his illness.     Physical Exam   Vital Signs: Temp: 98.5  F (36.9  C) Temp src: Oral BP: 123/50 Pulse: 75   Resp: 16 SpO2: 96 % O2 Device: None (Room air)    Weight: 0 lbs 0 oz    Constitutional: sitting in bed, anxious appearing   CV: regular rate and rhythm, no murmurs rubs or gallops  Resp: clear to auscultation bilaterally  Abd: soft, non-tender,  non-distended, no hepatosplenomegaly or masses palpated  Neuro: alert and oriented x 3, speech clear      Medical Decision Making       70 MINUTES SPENT BY ME on the date of service doing chart review, history, exam, documentation & further activities per the note.      Data         Imaging results reviewed over the past 24 hrs:   No results found for this or any previous visit (from the past 24 hour(s)).

## 2023-06-27 NOTE — PROGRESS NOTES
Orthopedic Surgery Progress Note: 06/27/2023    Subjective:   No acute events overnight. AVSS. IR for tunneled line today. ID recs cefepime to cipro at [facility]. More drainage from drain site. Dressings dry. Was not able to get OOB yesterday - needs assistance. Bottom is sore. Pain well-controlled. Voiding spontaneously. LBM 6/26. No new concerns or complaints.    Objective:   BP (!) 143/53 (BP Location: Left arm)   Pulse 72   Temp 97.7  F (36.5  C) (Oral)   Resp 16   SpO2 96%   I/O this shift:  In: -   Out: 315 [Urine:300; Drains:15]  General: NAD. Resting comfortably in bed.  Respiratory: Breathing comfortably on RA.  Drain Output:  Output by Drain (mL) 06/25/23 0700 - 06/25/23 1459 06/25/23 1500 - 06/25/23 2259 06/25/23 2300 - 06/26/23 0659 06/26/23 0700 - 06/26/23 1459 06/26/23 1500 - 06/26/23 2259 06/26/23 2300 - 06/27/23 0519   Closed/Suction Drain Left Thigh         Closed/Suction Drain Anterior;Left Thigh Bulb 15 [place] 10  (P)30 30  15   Musculoskeletal: Focused exam of the left lower extremity: Drain in place. Sanguinous drainage on dressings. Fires GSC, TA, FHL, and EHL minimally. Incredibly sensitive to touch on feet. Overall decreased sensation distally. Brisk capillary refill.     Laboratory Data:  Lab Results   Component Value Date    WBC 13.9 (H) 06/25/2023    HGB 7.6 (L) 06/26/2023     06/25/2023      Intraoperative cultures   6/21/2023: MRSA, C Acnes    Assessment & Plan:   Waldo Bustos is a 72 year old male status post left revision total hip arthroplasty on 5/26/2023 with Dr. Meyers now presenting with left hip prosthetic joint infection status post left hip irrigation and debridement on 6/21/2023 with Dr. Meyers.    CRP downtrending. C acnes, MRSA in cultures. Treating with vanc/cefepime (to cipro at discharge) per ID recs. Appreciate assistance.    Goals for Today:  - Pain control and mobilization - appreciate therapies recs  - OK for nursing to remove and replace absorptive dressing  over drain site as needed  - Discharge planning    Ortho Plan:  - Activity: Up with assist until independent. Posterior hip precautions x 3 months (no flexion beyond 90 degrees, no adduction beyond midline, and no internal rotation beyond midline).  - Weightbearing Status: WBAT LLE.  - Pain Management: Transition from IV to PO as tolerated.  - Antibiotics: Vancomycin and cefepime per ID.  - Diet: OK for a diet from an orthopedic perspective.  - DVT Prophylaxis: SCDs and PTA apixaban 5 mg BID.  - Imaging: None pending.  - Labs: Labs PRN.  - Bracing/Splinting: Abduction pillow while in bed.  - Dressings: Keep dressing C/D/I x 2 weeks.  - Drains: GUERRERO drain in place (remove in 2 weeks).  - Tran Catheter: None.   - Physical Therapy/Occupational Therapy: Evaluation and treatment.  - Cultures: Intraoperative cultures 6/21/2023.  - Follow-up: Clinic with Dr. Meyers's team in 2 weeks without radiographs.    - Disposition: Pending progress with therapies, pain control on orals, and medical stability; anticipate discharge to TCU with timing TBD.    Orthopedic Surgery staff for this patient is Dr. Meyers.    ------------------------------------------------------------------------------------------    Hannah Starr MD, PGY-4  Orthopedics

## 2023-06-27 NOTE — PROVIDER NOTIFICATION
Pt has a schedule CVC placement, provider was notified on holding patient medications. Patient has a schedule morning blood thinning medication. Writer was was instructed to give all medication since it won't make no difference to hold blood thinning medication just few hours piror to surgery. Will continue to monitor until 3:30pm 6/27/2023.

## 2023-06-27 NOTE — PLAN OF CARE
VS: Temp: 97.7  F (36.5  C) Temp src: Oral BP: (!) 143/53 Pulse: 72   Resp: 16 SpO2: 96 % O2 Device: None (Room air)      O2: SpO2 > 95% and stable on RA. LS clear and equal bilaterally. Denies chest pain and SOB.    Output: Voids spontaneously without difficulty using beside urinal.   Last BM: 6/26/2023 per pt & RN report that pt had large diarrhea , denies abdominal discomfort. BS active / passing flatus.  Pt was c/o burning at the buttocks, but pt refused assessment & repositioning. Education was done & activity was encouraged.    Activity: Not OOB this shift. Ax3 or more with bed care.    Skin: Pt declined full skin assessment. Visible skin intact except, L hip surgical incision.   Pain: Pain managed with scheduled Robaxin, PRN Tylenol & Tramadol (50mg).   CMS: Intact, AOx4. BLE numbness.   Dressing: L hip surgical incision is CDI.   Diet: Regular diet. Denies nausea/vomiting.   NPO after 0400 AM for CVC tunnel placement today @0100 PM.   LDA: R PIV SL. L hip GUERRERO drain with output of : 15 mL   Equipment: IV pole, personal belongings,    Plan: CVC Tunnel placement today. Continue with plan of care. Call light within reach, pt able to make needs known.    Additional Info: Pt refused his 0400 cefepime, stating that it gives him diarrhea & his buttocks are burning. Education was done about the importance of taking ABX & that he had antidiarrheal medication. Pt was encouraged to be turned to  Assess his buttocks, clean him up & help him with the burning, but pt refused to be turned. Education was done.

## 2023-06-27 NOTE — PRE-PROCEDURE
GENERAL PRE-PROCEDURE:   Procedure:  IR CVC TUNNEL PLACEMENT  Date/Time:  6/27/2023 12:21 PM    Written consent obtained?: Yes    Risks and benefits: Risks, benefits and alternatives were discussed    Consent given by:  Patient  Patient states understanding of procedure being performed: Yes    Patient's understanding of procedure matches consent: Yes    Procedure consent matches procedure scheduled: Yes    Expected level of sedation:  Minimal  Appropriately NPO:  Yes  ASA Class:  2  Mallampati  :  Grade 2- soft palate, base of uvula, tonsillar pillars, and portion of posterior pharyngeal wall visible  Lungs:  Lungs clear with good breath sounds bilaterally  Heart:  Normal heart sounds and rate  History & Physical reviewed:  History and physical reviewed and no updates needed  Statement of review:  I have reviewed the lab findings, diagnostic data, medications, and the plan for sedation

## 2023-06-27 NOTE — PROGRESS NOTES
"Prolonged Parenteral/Oral Antibiotic Recommendations and ID Follow up  This template provides final ID recommendations as of this date. If there are clinical changes or questions please call the ID team.     Infectious Diseases Indication: L hip PJI, with hardware retention    Antibiotic Information  Name of Antibiotic Dose of Antibiotic1 Pharmacy to assist with dosing Y/N Anticipated duration Effective start date2 End date   Vancomycin AUC goal 400-600 Y 6 weeks 6/21/23 8/1/23   Ciprofloxacin 750mg PO BID N 5 weeks* 6/27/23 8/1/23           1.Dose of antibiotic will need to be renally adjusted if creatinine clearance changes  2.Effective start date is the date of therapy with appropriate spectrum  * Patient received lead-in cefepime so total duration is still adequate    Method of antibiotic delivery:PICC line. At the end of therapy should the line be removed? No. Selecting \"yes\" will function as written order to remove PICC line at the end of therapy.    Tentative plans for disposition: Transitional Care Unit    Weekly labs required: CBC with diff, CMP, CRP and Vancomycin level. Dr. Corbin will follow labs at discharge until ID follow up. Please have labs faxed to ID clinic.    Appointment to be scheduled: 4-6 weeks after discharge. Type of ID Clinic Appointment In-Person visit. Appointment will be scheduled with: Elysia Corbin NP. Routine hospital follow up appointments are 30 minutes. If 60 minutes is necessary due to complexity of case indicate that a 60 min appointment is required. Appointment time Appointment Time: 30 minutes. If the patient remains in the hospital at this date please re-consult ID.     Imaging for ID follow up: ID Imaging: No.     ID provider to route this note to the appropriate [facility] pools: [facility] INFECTIOUS DISEASE ADULT CSC, PANDA, [facility] HOME INFUSION    [facility]/ID Clinic Information:  [address]  [place]  [address]  Phone: [phone]  Fax: " [phone] (Attention Tova Cabral)

## 2023-06-28 NOTE — PLAN OF CARE
VS: /61 (BP Location: Left arm)   Pulse 72   Temp (!) 96.5  F (35.8  C) (Oral)   Resp 16   SpO2 99%    O2: Stable on room air   Output: Voids without difficulty using urinal   Last BM: 6/28- active bowel sounds   Activity: Not oob. Ax3 with turns and bed cares   Skin: L Hip surgical incision/ Pressure injury L heel- mepilex on. Bruising BUE.    Pain: denies   CMS: Numbness to bilateral feet  +3 edema to LLE. +2 edema to RLE.   - Refused assessment on L foot- does not want anyone to touch it.    Dressing: L hip surgical incision- dried drainage   Diet: Regular diet, thin liquids, takes pills whole   LDA: R PIV saline locked. R jugular CVC- patent, blood return noted  GUERRERO drain L thigh output 35 mL   Equipment: IV pole, personal belongings   Plan: Awaiting TCU placement   Additional Info: A&Ox4; flat affect but cooperative. Denies headache, chest pain, SOB. Uses call light appropriately and makes needs known.

## 2023-06-28 NOTE — PLAN OF CARE
Goal Outcome Evaluation:        VSS /41 (BP Location: Left arm)   Pulse 73   Temp 97.8  F (36.6  C) (Axillary)   Resp 16   SpO2 93%      O2 No SOB    Output Voiding via urinal   LBM 6/28/23   Activity Not oob   Up for meal yes   Skin Incision on left hip. Pressure sore on heel.    Pain No pain meds given on this shift    Neuro/CMS Ax4   Dressing Dressing on hip    Diet R-thin whole   LDA PIV, CVC   Plan possible discharge to TCU.

## 2023-06-28 NOTE — PROGRESS NOTES
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  Postop course complicated by profound deconditioning.  He was admitted to TCU initially on 12/23/2022 for physical rehabilitation, however therapies no longer worked with patient after several months of an in bed therapy and his refusals to mobilize.  Underwent biopsy on 4/7/2023 without evidence of infection.  He was transferred back to [facility] for left total hip arthroplasty and reimplantation on 5/26/23 with Dr. Meyers.  Presented again to [place] for persistent MRSA bacteremia on 6/17.     Brief Hospital Summary:   Pt was admitted to continue infectious workup with MRSA bacteremia.  Admission blood cultures were positive, ID and Ortho were consulted. KEILA was obtained on 6/19, which was negative for endocarditis, ortho took for washout/debridement of hip on 6/21. ID recommending 6 weeks abx (6/21-8/1), patient recovering from HALEY, but given history of HALEY's and need for long term vanc plus comorbids making declining kidney function in the future probable nephrology doesn't feel comfortable with patient getting a PICC -- Tunneled internal jugular line placed. Transitioned cefepime to cipro, continue vancomycin.     Plan for today:  - slight increase in creatinine with supratherapeutic vancomycin level. LR bolus repeat BMP in AM  - ID monitoring QTc given initiation of cipro, ideally will continue cipro/vanc through 8/1. If he is unable to tolerate, could switch back to cefepime given line placement,  though this would necessitate 4 total doses of abx daily (1x vanc, 3x cefepime)  - Palliative care for GOC discussion    MRSA bacteremia due to possible left hip septic arthritis vs other source Blood cultures: 6/14, 6/15 + for MRSA (4/4), and 6/16 (1/2 GPCs) and 6/17 (2/2) GPCs. - started vancomycin 6/15.   6/17 sinovial fluid cultures with staph and klebsiella. Purulent drainage noted at incision site at TCU, CT left hip concerning for infection (no mention of joint, mostly anterior in soft tissue) and couldn't exclude abscess/inf  - Continue vancomycin, added rocephin 6/21 and changed to cefepime 6/23; ID recommends 6 weeks IV vanc (last day 8/1/23) and can transition to oral cipro 750 mg BID at discharge, needs weekly vanc level, cbcd, cmp, crp while on IV abx, regular ECG monitoring of QTc on high dose cipro --> follow up outpatient with ID prior to discontinuing antibiotics (needs appt end of July)  - blood cultures negative 6/19 and 6/20 (last + was 6/17), trend CRP q48h (way down since initiation of treatment and washout)  - intraop cultures are growing Staph, Pseudomonas, and C acnes    Bilateral LE edema due to volume overload from HALEY and iatrogenic from blood transfusions  - start lasix 60 mg PO BID per nephrology    Hypoxemic Respiratory Failure due to immobility, surgery - resolved (sena)  - patient with fluctuating oxygen requirement but off O2 since 6/23  - continue to follow; encourage IS    Acute blood loss anemia following surgery, complicated by use of blood thinner  - hgb 7.6 this morning; got 3 units day after surgery did joint washout (6/22) for acute blood loss anemia, 1 unit 6/25  - watch for bleeding; hold apixaban if need be - had unprovoked dvt in 2018 and would be on eliquis for life probably, at really high risk of recurrent DVT given recent procedures and illness/immobility  - I added on some iron studies and B12 level today; interpret with caution given transfusions; probably wouldn't  hurt to supplement iron and folic acid given rapid turnover and need for transfusions, ordered to start tomorrow    Acute Kidney Injury - multifactorial, see below  - baseline creatinine 1.0 or 1.1  - etiology: suspect combo of sepsis/inflammation, hypovolemia, blood loss anemia following surgery, vancomycin?  - volume (hypovolemia/congestion): euvolemic but tough to assess based on habitus  - nephrotoxins: vancomycin  - diabetes: yes; last A1C < 6, though  - sepsis: lil bit, resolved now  - anemia: yes, ongoing  - cardiac performance: normal based on KEILA from 6/19  - inflammation: from surgery and infection  - creatinine downtrending; neph ordered C3 and 4, pending still (6/23)    S/p left hip explantation of left hip antibiotic spacer and revision of left total hip arthroplasty (5/26/2023)  - ortho performed washout 6/21; drain to come out in 2 weeks  -Activity: 50% PWB LLE with posterior hip precautions [id number]  -AFO ordered for foot drop but patient refusing to use  -PT/OT  -Knee to be kept even in a foam leg elevator to relax sciatic nerve  -Pain: Scheduled robaxin 4 times a day, APAP PRN                 -oxycodone  to 5-7.5 mg discontinued                 -tramadol 25-50 mg every 6 hours PRN     Hx of unprovoked DVT in 2018   - on eliquis 5 mg bid     Altered mental status due to Delirium from illness/hospitalization  resolved  - delirium precuations      Type 2 diabetes mellitus  - Last hemoglobin A1c 5.6% on 6/16/2023.  On glipizide, metformin, Actos, and Victoza prior to hospitalizations and surgery.   - continue ISS    Constipation  -Continue Colace 100 mg twice daily  -Continue senna 1 tablet twice daily  -Continue MiraLAX daily     Hypomagnesemia  -monitor and replete      Severe malnutrition in the context of acute on chronic illness  Goal for patient is to consume % of meals TID. Goal is 6690-3720 kcals/daily. Protein needs- 100-126 grams/daily.   -RD consult and appreciate recs  -regular  diet  -Weekly weights, daily I/O     Adjustment disorder with depressed mood  Prolonged grief disorder  Patient has been with depressed mood in setting of ongoing health issues, as evidenced by lack of motivation to work with therapies during both TCU admissions.  Past history of passive suicidal statements. Psychiatry saw patient during hospital admission on 5/31 due to lack of motivation with therapies. Patient declined interest in starting any psychiatric medications. Was willing to engage with health psychology.   -Outpatient health psychology consult  -Continue to monitor for willingness to start antidepressant therapy     Stable and Resolved Issues:  Hyperlipidemia-continue PTA Lipitor 40 mg daily  JAIR-CPAP when sleeping         Diet: Snacks/Supplements Adult: Other; Chocholate or Butter Pecan Glucerna with bkf trays daily; With Meals  Regular Diet Adult    DVT Prophylaxis: DOAC  Tran Catheter: Not present  Lines: PRESENT      CVC Single Lumen Right Internal jugular Non - valved (open ended);Tunneled;Power injectable-Site Assessment: WDL      Cardiac Monitoring: None  Code Status: Full Code      Clinically Significant Risk Factors              # Hypoalbuminemia: Lowest albumin = 2.2 g/dL at 6/26/2023  5:13 AM, will monitor as appropriate             # Severe Malnutrition: based on nutrition assessment         Disposition Plan     Expected Discharge Date: 06/28/2023,  3:00 PM      Discharge Comments:  TCU          Caden Frazier MD  Hospitalist Service, GOLD TEAM 21  [facility]  Securely message with [employer] (more info)  Text page via [facility] Paging/Directory   See signed in provider for up to date coverage information  ______________________________________________________________________    Interval History   No acute events overnight. Patient is feeling overwhelmed with his illness, more tired today.     Physical Exam   Vital Signs: Temp: 97.8  F (36.6  C)  Temp src: Axillary BP: 108/41 Pulse: 73   Resp: 16 SpO2: 93 % O2 Device: None (Room air) Oxygen Delivery: 2 LPM  Weight: 0 lbs 0 oz    Constitutional: sitting in bed, anxious appearing   CV: regular rate and rhythm, no murmurs rubs or gallops  Resp: clear to auscultation bilaterally  Abd: soft, non-tender, non-distended, no hepatosplenomegaly or masses palpated  Neuro: alert and oriented x 3, speech clear      Medical Decision Making       50 MINUTES SPENT BY ME on the date of service doing chart review, history, exam, documentation & further activities per the note.      Data     I have personally reviewed the following data over the past 24 hrs:    N/A  \   N/A   / N/A     N/A N/A N/A /  112 (H)   N/A N/A 1.59 (H) \       Imaging results reviewed over the past 24 hrs:   Recent Results (from the past 24 hour(s))   IR CVC Tunnel Placement > 5 Yrs of Age    Narrative    PROCEDURE: Tunneled central venous catheter placement     Procedural Personnel  Advanced practice provider(s): Sher Modi PA-C    Procedure Date (mm/dd/yyyy): 6/27/2023    Pre-procedure diagnosis: Infection of prosthetic joint, subsequent  encounter  Post-procedure diagnosis: Same  Indication: Other-IV antibiotics  Additional clinical history: Infected left hip requires intravenous  antibiotic course, nephrology deems PICC contraindicated due to  potential use of upper extremities for dialysis fistula in the future.  IR consulted for tunneled single lumen central venous catheter  placement    Complications: No immediate complications.      Impression    IMPRESSION:    Insertion of right-sided tunneled 5 [place] single lumen power line  catheter, with tip in the expected location of the superior vena cava.    Plan:     The catheter may be used immediately.  _______________________________________________________________    PROCEDURE SUMMARY:  - Venous access with ultrasound guidance  - Tunneled central venous catheter insertion with  fluoroscopic  guidance  - Additional procedure(s): None    PROCEDURE DETAILS:    Pre-procedure  Consent: Informed consent for the procedure including risks, benefits  and alternatives was obtained and time-out was performed prior to the  procedure.  Preparation (MIPS): The site was prepared and draped using all  elements of maximal sterile barrier technique including sterile  gloves, sterile gown, cap, mask, large sterile sheet, sterile  ultrasound probe cover, hand hygiene and cutaneous antisepsis with 2%  chlorhexidine.   Medical reason for site preparation exception (MIPS): Not applicable    Anesthesia/sedation  Level of anesthesia/sedation: Minimal sedation (anxiolysis), 1 mg  midazolam IV  Anesthesia/sedation administered by: Independent trained observer  under attending supervision with continuous monitoring of the  patient?s level of consciousness and physiologic status  Total intra-service sedation time (minutes): N/a    Access  Local anesthesia was administered. The vessel was sonographically  evaluated and determined to be patent. Real time ultrasound was used  to visualize needle entry into the vessel and a permanent image was  stored.  Laterality: Right  Vein accessed: Internal jugular vein  Access technique: Micropuncture set with 21 gauge needle    Catheter placement  An incision was made near the venous access site and the catheter was  tunneled subcutaneously to the venous access site and trimmed to  appropriate length. The catheter was advanced via a peel-away sheath  into the vein under fluoroscopic guidance. Catheter tip location was  fluoroscopically verified and a permanent image was stored.  Catheter placed: Bard PowerLine with SureCuff Ingrowth Cuff  Lot number: [id number]  Catheter size (Lao): 5  Lumens: Single-lumen   Power injectable: Yes  Catheter tip: superior vena cava  Catheter flush: Heparin (10 units/mL)    Closure  A sterile dressing was applied.  Access site closure technique:  Tissue adhesive  Catheter securement technique: Non absorbable suture and Stat-Lock    Radiation Dose  Fluoroscopy time (seconds): 4.4    Reference air kerma (mGy): 1.21     Additional Details  Additional description of procedure: None  Device used: None  Equipment details: None  Unique Device Identifiers: Not available  Specimens removed: None  Estimated blood loss (mL): Less than 10  Standardized report: [email or web address]    Attestation  Signer name: SCOT DOYLE PA-C  I attest that I was present for the entire procedure. I reviewed the  stored images and agree with the report as written.      SCOT DOYLE PA-C         SYSTEM ID:  [id number]

## 2023-06-28 NOTE — PROGRESS NOTES
[place] ID Service [facility] [place]      Patient:  Waldo Bustos, Date of birth 1950, Medical record number [id number]  Date of Visit:  June 19, 2023         Assessment and Recommendations:   Problem List:  1. MRSA bacteremia, suspected secondary to left hip PJI and associated soft tissue infection; KEILA negative for IE   -6/14 blood cultures: 4 of 4 bottles positive  -6/15 blood cultures: 4 of 4 bottles positive  -6/17 blood cultures: 2 of 4 bottles positive  -6/19 NGTD  -6/20 NGTD  2. Chronic left hip PJI, s/p 2 stage revision with re-implantation on 5/26/23; I&D on 6/21/23 without liner exchanged  -GUERRERO drain in place growing MRSA, Corynebacterium, and Klebsiella (collected after drain was already established)  -6/21 intraop cultures: MRSA, C.acnes, and Pseudomonas  3. DM2  4. RA  5. Venous insufficiency  6. QTC = 490 (previously 421)    Recommendations:  - Continue IV vancomycin (pharmacy to dose) and oral ciprofloxacin 750mg PO BID (target dose, may need to adjust for HALEY) for now   - Anticipate an initial 6 weeks of IV vancomycin therapy, followed by likely transition to oral anti-staph antibiotics   - Long-term antibiotic plan to be determined in outpatient ID follow up   - Weekly CBC w/diff, CMP, CRP, and vancomycin level while in IV therapy   - Would check QTc measurement once weekly during the first 2 weeks of quinolone therapy, can discontinue after that if it remains stable    -- Check CBC w/diff and CRP tomorrow  -- Will check QTc again tomorrow; now that central access has been established, it may be preferable to switch back to cefepime given borderline QTc. I will determine in the coming day or two.      Discussion:  Pt who was followed by ID team at  (last by Johnny Mercado and Shira on [facility]), transferred to [facility] for KEILA, now back to [facility] on 6/20/23.      71yo M history of chronic left hip PJI, most recently s/p revision left BELLA on 5/26, who has been in TCU since  discharge on 6/2, but then developed MRSA bacteremia with confusion and somnolence. CT hip without contrast showed soft tissue stranding and multiple foci of subcutaneous emphysema about the left hip, especially anteriorly, partially visualized fluid collection within subcutaneous fat superficial-medial to the proximal adductor longus (caudal extent of which is incompletely visualized) which appears enlarged compared to outside CT 10/25/2022. Infection/abscess not excluded.  Blood cultures remained persistently positive for MRSA ( 6/14-6/17) but have been negative since 6/19/23. KEILA negative. Presumed hip infection and they obtained cultures from drain on 6/17/2023 - showing MRSA, Klebsiella, and Corynebacterium. Patient taken to OR for DAIR with Dr. Meyers on 6/21. OR report without any overt infection, but I did confirm with Dr. Meyers today that we should treat this as a prosthetic joint infection, which he agreed. Intraoperative cultures collected. Ceftriaxone added to vancomycin after I&D for coverage of Klebsiella (grown from drain). Intraop cultures without Klebsiella or Corynebacterium, but now showing Pseudomonas and Cutibacterium. Transitioned to cefepime, along with vancomycin to treat MRSA.    Will plan 6 weeks of IV vancomycin along with high-dose oral ciprofloxacin 750mg PO BID. Likely to discharge to TCU. Patient will follow up in ID clinic for ongoing antibiotic management thereafter.         ID will continue to follow.    Shelton Meredith MD  Infectious Diseases   [phone]        Interval History:   Afebrile. Patient was extra sleepy/drowsy today and didn't participate in interview. Tunneled line placed yesterday. Sounds like stooling has slowed down per chart review.           Physical Exam:   Ranges for vital signs:  Temp:  [96.9  F (36.1  C)-97.9  F (36.6  C)] 97.8  F (36.6  C)  Pulse:  [65-75] 73  Resp:  [10-16] 16  BP: (108-139)/(41-76) 108/41  SpO2:  [93 %-100 %] 93 %    Intake/Output Summary  (Last 24 hours) at 6/16/2023 1115  Last data filed at 6/15/2023 2239  Gross per 24 hour   Intake 50 ml   Output --   Net 50 ml     Exam:  GENERAL: Well-developed, well-nourished. Quite drowsy today. Minimally participates in interview.  ENT:  Head is normocephalic, atraumatic.   EYES:  Eyes have anicteric sclerae.    LUNGS:  Breathing comfortably  EXT: Extremities warm and without edema. Left hip with GUERRERO drain in place, serosanguinous fluid in line and collection vessel.  SKIN:  No acute rashes. Surgical site with no obvious erythema or purulence.  NEUROLOGIC:  Grossly nonfocal, though confused and somnolent         Laboratory Data:   Reviewed.  Pertinent for:    Microbiology:  Culture   Date Value Ref Range Status   06/21/2023 1+ Cutibacterium (Propionibacterium) acnes (A)  Final     Comment:     Susceptibility testing of Cutibacterium (Propionibacterium) species is not done from this source. This organism is susceptible to penicillin and cefotaxime and most are susceptible to clindamycin.   06/21/2023 No anaerobic organisms isolated  Final   06/21/2023 No anaerobic organisms isolated  Final   06/21/2023 No anaerobic organisms isolated  Final   06/21/2023 1+ Staphylococcus aureus (A)  Final     Comment:     Susceptibilities done on previous cultures   06/21/2023 1+ Pseudomonas aeruginosa (A)  Final   06/21/2023 1+ Staphylococcus aureus MRSA (A)  Final   06/21/2023 2+ Staphylococcus aureus (A)  Final     Comment:     Susceptibilities done on previous cultures   06/21/2023 1+ Pseudomonas aeruginosa (A)  Final     Comment:     Susceptibilities done on previous cultures   06/21/2023 1+ Staphylococcus aureus (A)  Final     Comment:     Susceptibilities done on previous cultures   06/21/2023 2+ Staphylococcus aureus (A)  Final     Comment:     Susceptibilities done on previous cultures   06/21/2023 No anaerobic organisms isolated  Final   06/21/2023 2+ Staphylococcus aureus MRSA (A)  Final   06/20/2023 No Growth  Final    06/20/2023 No Growth  Final   06/19/2023 No Growth  Final   06/19/2023 No Growth  Final   06/17/2023 4+ Staphylococcus aureus MRSA (A)  Final   06/17/2023 2+ Corynebacterium striatum (A)  Final     Comment:     Identification obtained by MALDI-TOF mass spectrometry research use only database. Test characteristics determined and verified by the Infectious Diseases Diagnostic Laboratory.   06/17/2023 1+ Klebsiella pneumoniae (A)  Final   06/17/2023 No anaerobic organisms isolated after 10 days  Preliminary   06/17/2023 Positive on the 1st day of incubation (A)  Final   06/17/2023 Staphylococcus aureus MRSA (AA)  Final     Comment:     1 of 2 bottlesSusceptibilities done on previous cultures   06/17/2023 Positive on the 1st day of incubation (A)  Final   06/17/2023 Staphylococcus aureus MRSA (AA)  Final     Comment:     1 of 2 bottlesSusceptibilities done on previous cultures   06/17/2023 <10,000 CFU/mL Urogenital marimar  Final   06/16/2023 Positive on the 1st day of incubation (A)  Final   06/16/2023 Staphylococcus aureus MRSA (AA)  Final     Comment:     1 of 2 bottlesSusceptibilities done on previous cultures   06/16/2023 Positive on the 1st day of incubation (A)  Final   06/16/2023 Staphylococcus aureus MRSA (AA)  Final     Comment:     1 of 2 bottlesSusceptibilities done on previous cultures   06/15/2023 Positive on the 1st day of incubation (A)  Final   06/15/2023 Staphylococcus aureus MRSA (AA)  Final     Comment:     2 of 2 bottlesSusceptibilities done on previous cultures   06/15/2023 Positive on the 1st day of incubation (A)  Final   06/15/2023 Staphylococcus aureus MRSA (AA)  Final     Comment:     2 of 2 bottlesSusceptibilities done on previous cultures   06/14/2023 Positive on the 1st day of incubation (A)  Final   06/14/2023 Staphylococcus aureus MRSA (AA)  Final     Comment:     2 of 2 bottlesSusceptibilities done on previous cultures   06/14/2023 Positive on the 1st day of incubation (A)  Final    06/14/2023 Staphylococcus aureus MRSA (AA)  Final     Comment:     2 of 2 bottles     05/26/2023 No anaerobic organisms isolated  Final   05/26/2023 No anaerobic organisms isolated  Final   05/26/2023 No anaerobic organisms isolated  Final   05/26/2023 No anaerobic organisms isolated  Final   05/26/2023 No Growth  Final   05/26/2023 No Growth  Final   05/26/2023 No Growth  Final   05/26/2023 No Growth  Final   05/26/2023 No anaerobic organisms isolated  Final   05/26/2023 No Growth  Final   04/07/2023 No anaerobic organisms isolated  Final   04/07/2023 No Growth  Final   04/07/2023 No anaerobic organisms isolated  Final   04/07/2023 No Growth  Final   04/07/2023 No anaerobic organisms isolated  Final   04/07/2023 No Growth  Final   04/07/2023 No anaerobic organisms isolated  Final   04/07/2023 No Growth  Final   04/07/2023 No anaerobic organisms isolated  Final   04/07/2023 No Growth  Final   04/07/2023 No anaerobic organisms isolated  Final   04/07/2023 No Growth  Final   11/22/2022 4+ Finegoldia magna (A)  Final     Comment:     Susceptibilities done on previous cultures   11/22/2022 No Growth  Final   11/22/2022 1+ Proteus mirabilis (A)  Final     Comment:     Susceptibilities done on previous cultures   11/22/2022 1+ Finegoldia magna (A)  Final     Comment:     Susceptibilities done on previous cultures   11/22/2022 No Growth  Final   11/22/2022 1+ Proteus mirabilis (A)  Final   11/22/2022 No anaerobic organisms isolated  Final   11/22/2022 No anaerobic organisms isolated  Final   11/22/2022 No anaerobic organisms isolated  Final   11/22/2022 No Growth  Final   11/22/2022 No Growth  Final   11/22/2022 No Growth  Final   11/22/2022 Isolated in broth only Proteus mirabilis (A)  Final     Comment:     On day 1 of incubationSusceptibilities done on previous cultures   11/22/2022 Isolated in broth only Proteus mirabilis (A)  Final     Comment:     On day 1 of incubationSusceptibilities done on previous cultures    11/22/2022 1+ Proteus mirabilis (A)  Final   11/22/2022 No anaerobic organisms isolated  Final   11/22/2022 No Growth  Final   11/22/2022 1+ Proteus mirabilis (A)  Final     Comment:     Susceptibilities done on previous cultures   11/14/2022 4+ Porphyromonas species (A)  Final     Comment:     Beta Lactamase Positive  Identification obtained by MALDI-TOF mass spectrometry research use only database. Test characteristics determined and verified by the Infectious Diseases Diagnostic Laboratory.   11/14/2022 1+ Finegoldia magna (A)  Corrected   11/14/2022 3+ Proteus mirabilis (A)  Final   11/14/2022 No Growth  Final     Metabolic Studies       Recent Labs   Lab Test 06/28/23  1110 06/28/23  0736 06/28/23  0658 06/27/23  2215 06/27/23 2017 06/27/23  1520 06/26/23  0738 06/26/23  0513 06/25/23  0812 06/25/23  0615 06/24/23  1405 06/24/23  1340 06/24/23  0801 06/24/23  0654 06/23/23  0916 06/23/23  0840 06/22/23  2120 06/22/23  1341 06/16/23  0820 06/16/23  0605 03/21/23  0810 03/20/23  1919 01/10/23  1710 01/10/23  1105   NA  --   --  138  --   --   --   --  139  --  139  --  137  --  137  --  137  --  138   < > 141   < >  --    < >  --    POTASSIUM  --   --  4.3  --   --   --   --  4.7  --  4.6  --   --   --  4.5  --  4.7  --  5.1   < > 4.2   < >  --    < >  --    CHLORIDE  --   --  109*  --   --   --   --  110*  --  108*  --   --   --  106  --  105  --  105   < > 104   < >  --    < >  --    CO2  --   --  20*  --   --   --   --  20*  --  22  --   --   --  22  --  22  --  23   < > 24   < >  --    < >  --    ANIONGAP  --   --  9  --   --   --   --  9  --  9  --   --   --  9  --  10  --  10   < > 13   < >  --    < >  --    BUN  --   --  39.8*  --   --   --   --  42.8*  --  46.0*  --   --   --  50.2*  --  50.2*  --  45.1*   < > 27.7*   < >  --    < >  --    CR  --   --  1.59*  1.59*  --   --   --   --  1.42*  --  1.65*  --  1.84*  --  2.05*  --  2.32*  --  2.43*   < > 1.14   < >  --    < >  --    GFRESTIMATED  --   --   46*  46*  --   --   --   --  53*  --  44*  --   --   --  34*  --  29*  --  28*   < > 69   < >  --    < >  --    * 112* 118* 161* 96 111*   < > 134*   < > 142*   < >  --    < > 166*   < > 132*   < > 155*   < > 171*   < >  --    < >  --    A1C  --   --   --   --   --   --   --   --   --   --   --   --   --   --   --   --   --   --   --  5.6  --   --    < >  --    MAIKOL  --   --  8.1*  --   --   --   --  7.6*  --  7.5*  --   --   --  7.2*  --  7.2*  --  7.3*   < > 8.3*   < >  --    < >  --    PHOS  --   --   --   --   --   --   --  3.7  --  3.9  --   --   --  4.2  --  4.7*  --  5.1*   < >  --   --   --   --   --    MAG  --   --   --   --   --   --   --   --   --  1.8  --   --   --  1.7  --  1.9  --   --    < > 1.7   < >  --   --   --    LACT  --   --   --   --   --   --   --   --   --   --   --   --   --   --   --   --   --   --   --   --   --  1.3  --  0.9    < > = values in this interval not displayed.     Hepatic Studies    Recent Labs   Lab Test 06/26/23  0513 06/25/23  0615 06/24/23  0654 06/23/23  0840 06/22/23  1341 06/22/23  0711 06/20/23  0704 06/19/23  0915 06/16/23  0605 06/14/23  0651 05/31/23  0630 05/26/23  0753   BILITOTAL  --   --   --   --   --   --  0.2 0.2 0.2 0.2 0.2 0.3   ALKPHOS  --   --   --   --   --   --  93 94 114 110 74 92   ALBUMIN 2.2* 2.3* 2.4* 2.4* 2.4* 2.2* 2.3* 2.6* 2.8* 2.8* 3.2* 3.7   AST  --   --   --   --   --   --  14 19 16 16 14 10   ALT  --   --   --   --   --   --  14 12 10 8 6* 6*     Hematology Studies      Recent Labs   Lab Test 06/26/23  0513 06/25/23  0615 06/24/23  0654 06/23/23  0840 06/22/23  1926 06/22/23  1341 06/22/23  0711 06/21/23  0800   WBC  --  13.9* 14.9* 14.0*  --  18.1* 17.8* 10.1   HGB 7.6* 6.8* 7.3* 7.5* 7.5* 6.7* 5.3* 7.3*   HCT  --  22.1* 23.0* 23.6*  --  21.4* 17.6* 24.1*   PLT  --  310 290 312  --  367 347 354     Arterial Blood Gas Testing    Recent Labs   Lab Test 06/15/23  1325 11/26/22  0945 11/22/22  1925 11/22/22  1644   PH 7.39  --   --   --     PCO2 44  --   --   --    PO2 83  --   --   --    HCO3 26  --   --   --    O2PER 2 32 40.0 47.0          Latest Ref Rng & Units 6/28/2023     6:58 AM 6/26/2023     5:13 AM 6/25/2023     6:15 AM 6/24/2023     1:40 PM 6/24/2023     6:54 AM   Transplant Immunosuppression Labs   Creat 0.67 - 1.17 mg/dL  0.67 - 1.17 mg/dL 1.59     1.59  1.42  1.65  1.84  2.05    Urea Nitrogen 8.0 - 23.0 mg/dL 39.8  42.8  46.0   50.2    WBC 4.0 - 11.0 10e3/uL   13.9   14.9           Imaging:   XR Chest Port 1 View  Result Date: 6/14/2023  IMPRESSION: Negative portable view. If further detail is needed comment consider upright PA and lateral examination when clinical condition permits. TAE MORRIS MD   SYSTEM ID:  [id number]    XR Abdomen 1 View  Result Date: 6/10/2023  IMPRESSION: Nonobstructive bowel gas pattern. Large stool burden. I have personally reviewed the examination and initial interpretation and I agree with the findings. JONNATHAN COLEMAN MD   SYSTEM ID:  [id number]

## 2023-06-28 NOTE — PHARMACY-VANCOMYCIN DOSING SERVICE
Pharmacy Vancomycin Note  Date of Service 2023  Patient's  1950   72 year old, male    Indication:  MRSA Bacteremia, vancomycin MARCOS </= 0.5   Day of Therapy: started 6/15/23  Current vancomycin regimen:  750 mg IV q24h  Current vancomycin monitoring method: AUC  Current vancomycin therapeutic monitoring goal: 400-600 mg*h/L    InsightRX Prediction of Current Vancomycin Regimen    Regimen: 750 mg IV every 24 hours.  Start time: 15:26 on 2023  Exposure target: AUC24 (range)400-600 mg/L.hr   AUC24,ss: 536 mg/L.hr - potential HALEY, true AUC may be higher  Probability of AUC24 > 400: 100 %  Ctrough,ss: 19.8 mg/L  Probability of Ctrough,ss > 20: 46 %  Probability of nephrotoxicity (Lodise HAJA ): 17 %      Current estimated CrCl = Estimated Creatinine Clearance: 54.2 mL/min (A) (based on SCr of 1.59 mg/dL (H)).    Creatinine for last 3 days  2023:  5:13 AM Creatinine 1.42 mg/dL  2023:  6:58 AM Creatinine 1.59 mg/dL    Recent Vancomycin Levels (past 3 days)  2023:  5:13 AM Vancomycin 25.1 ug/mL  2023:  6:58 AM Vancomycin 26.1 ug/mL    Vancomycin IV Administrations (past 72 hours)                   vancomycin (VANCOCIN) 750 mg in sodium chloride 0.9 % 250 mL intermittent infusion (mg) 750 mg New Bag 23 1526     750 mg New Bag 23 1507    vancomycin (VANCOCIN) 500 mg vial to attach to  mL bag (mg) 500 mg New Bag 23 1539                Nephrotoxins and other renal medications (From now, onward)    Start     Dose/Rate Route Frequency Ordered Stop    23 1500  vancomycin (VANCOCIN) 500 mg vial to attach to  mL bag         500 mg  over 1 Hours Intravenous EVERY 18 HOURS 23 0835      23 0930  furosemide (LASIX) tablet 60 mg         60 mg Oral 2 TIMES DAILY (Diuretics and Nitrates) 23 0927      23 1400  sodium chloride 0.9 % 18.4 mL with ropivacaine (NAROPIN) 5 MG/ mg, EPINEPHrine (ADRENALIN) 600 mcg, ketorolac (TORADOL) 30  mg          INTRA-ARTICULAR ONCE 06/21/23 1354               Contrast Orders - past 72 hours (72h ago, onward)    None          Interpretation of levels and current regimen:  Vancomycin level is reflective of -600    Has serum creatinine changed greater than 50% in last 72 hours: SCr trending up, recent HALEY    Urine output:  Low urine output     Renal Function:  potentially Worsening    InsightRX Prediction of Planned New Vancomycin Regimen    Regimen: 500 mg IV every 18 hours.  Exposure target: AUC24 (range)400-600 mg/L.hr   AUC24,ss: 480 mg/L.hr  Probability of AUC24 > 400: 98 %  Ctrough,ss: 18.2 mg/L  Probability of Ctrough,ss > 20: 16 %  Probability of nephrotoxicity (Lodise HAJA 2009): 14 %    Plan:  1. Decrease Dose to 500 mg IV q18h as conservative dosing d/t SCr uptrending (possible HALEY again)   2. Vancomycin monitoring method: AUC  3. Vancomycin therapeutic monitoring goal: 400-600 mg*h/L  4. Pharmacy will check vancomycin levels as appropriate in 1-3 Days.  5. Serum creatinine levels will be ordered a minimum of twice weekly.    Michael Sosa, [facility]

## 2023-06-28 NOTE — PROVIDER NOTIFICATION
"Paged cross cover: 552 D.L. (5 ortho) Pt c/o new \"twitching\" stated it started a couple days ago.     "

## 2023-06-28 NOTE — PLAN OF CARE
Pt still sitting on BM  did not call to be changed as he promised earlier Reproached pt  for check and change again  , pt requested  to be given half hour. Staff will approach him again at 10:05 MD notified via paging.

## 2023-06-28 NOTE — PLAN OF CARE
VS: /50 (BP Location: Left arm, Patient Position: Semi-Fuentes's)   Pulse 75   Temp 97.9  F (36.6  C) (Oral)   Resp 16   SpO2 93%     O2: O2 SATS >90% denies chest pain,  or SBO   Output: Voids adequately using urinal   Last BM: 6/27 //23 BS present all x4 quadrants    Activity: Not OOB this shift AO3  with cares    Up for meals? N/A   Skin: L hip surgical incision,scalling/ scab to bilateral lower foot, readiness to coccyx barrier cream applied,  wound to left heel , dressing changed this shift    Pain: Pain managed with prn ,Tramadol  And robaxin   CMS: Intact Denies numbness and tingling   Dressing: AOX4 baseline numbness and tingling to BLE    Diet: Regular diet diabetic with BG checks and sliding scale    LDA: R PIV SL. L hip GUERRERO drain with output of    Equipment: IV Pole,  celling  lift personal belongings   Plan: Continue with plan of care    Additional Info:  Pt still missing phone no report  from IR will continue to search for it. Pt allowed staff to cahnge him at around 2330 after couple of attempts  to change and reposition him.

## 2023-06-28 NOTE — PLAN OF CARE
Pt siting on BM writer and 2 FABBY went to the room to change and clean the pt but he is refusing,refusing to turn  and states nobody should touch him.  Explained the risks to his skin for sitting on BM  for long time , states he understands and he does not want to be changed or touched . Charge nurse notified, pt still refuses to be changed  Pain medication administered,promised to call  Staff when ready to be changed. We will continue to monitor and update.

## 2023-06-28 NOTE — PLAN OF CARE
Pt  approached again  but requested writer to come back in 15 minutes, states he feels like he has to go again.  Changed his mind ans Asks staff to come back in 30 minutes . Will continue to check and offer to change .

## 2023-06-28 NOTE — PROGRESS NOTES
Orthopedic Surgery Progress Note: 06/28/2023    Subjective:   No acute events overnight. AVSS. Refusing some nursing cares.  Transitioned to cipro by ID. Assist of 3 and insurance issues. Dispo planning ongoing.     Objective:   /50 (BP Location: Left arm, Patient Position: Semi-Fuentes's)   Pulse 75   Temp 97.9  F (36.6  C) (Oral)   Resp 16   SpO2 93%   I/O this shift:  In: -   Out: 25 [Drains:25]  General: NAD. Resting comfortably in bed.  Respiratory: Breathing comfortably on RA.  Drain Output:  Output by Drain (mL) 06/26/23 0700 - 06/26/23 1459 06/26/23 1500 - 06/26/23 2259 06/26/23 2300 - 06/27/23 0659 06/27/23 0700 - 06/27/23 1459 06/27/23 1500 - 06/27/23 2259 06/27/23 2300 - 06/28/23 0640   Closed/Suction Drain Left Thigh     30 25   Closed/Suction Drain Anterior;Left Thigh Bulb 15 French 30  15      Musculoskeletal: Focused exam of the left lower extremity: Drain in place. Sanguinous drainage on dressings. Fires GSC, TA, FHL, and EHL minimally. Incredibly sensitive to touch on feet. Overall decreased sensation distally. Brisk capillary refill.     Laboratory Data:  Lab Results   Component Value Date    WBC 13.9 (H) 06/25/2023    HGB 7.6 (L) 06/26/2023     06/25/2023      Intraoperative cultures   6/21/2023: MRSA, C Acnes     Assessment & Plan:   Waldo Bustos is a 72 year old male status post left revision total hip arthroplasty on 5/26/2023 with Dr. Meyers now presenting with left hip prosthetic joint infection status post left hip irrigation and debridement on 6/21/2023 with Dr. Meyers.    CRP downtrending. C acnes, MRSA in cultures. Treating with vanc/cefepime (to cipro at discharge) per ID recs. Appreciate assistance.    Goals for Today:  - Pain control and mobilization - appreciate therapies recs  - OK for nursing to remove and replace absorptive dressing over drain site as needed  - Discharge planning    Ortho Plan:  - Activity: Up with assist until independent. Posterior hip  precautions x 3 months (no flexion beyond 90 degrees, no adduction beyond midline, and no internal rotation beyond midline).  - Weightbearing Status: WBAT LLE.  - Pain Management: Transition from IV to PO as tolerated.  - Antibiotics: Vancomycin and cipro, per ID.  - Diet: OK for a diet from an orthopedic perspective.  - DVT Prophylaxis: SCDs and PTA apixaban 5 mg BID.  - Imaging: None pending.  - Labs: Labs PRN.  - Bracing/Splinting: Abduction pillow while in bed.  - Dressings: Keep dressing C/D/I x 2 weeks.  - Drains: GUERRERO drain in place (remove in 2 weeks).  - Tran Catheter: None.   - Physical Therapy/Occupational Therapy: Evaluation and treatment.  - Cultures: Intraoperative cultures 6/21/2023.  - Follow-up: Clinic with Dr. Meyers's team in 2 weeks without radiographs.    - Disposition: Pending progress with therapies, pain control on orals, and medical stability; anticipate discharge to TCU with timing TBD.    Orthopedic Surgery staff for this patient is Dr. Meyers.    ------------------------------------------------------------------------------------------    Hannah Starr MD, PGY-4  Orthopedics

## 2023-06-29 NOTE — PROGRESS NOTES
General ID Service [facility]: Follow Up Note      Patient:  Waldo Bustos, Date of birth 1950, Medical record number [id number]  Date of Visit:  June 19, 2023         Assessment and Recommendations:   Problem List:  1. MRSA bacteremia, suspected secondary to left hip PJI and associated soft tissue infection; KEILA negative for IE   -6/14 blood cultures: 4 of 4 bottles positive  -6/15 blood cultures: 4 of 4 bottles positive  -6/17 blood cultures: 2 of 4 bottles positive  -6/19 NGTD  -6/20 NGTD  2. Chronic left hip PJI, s/p 2 stage revision with re-implantation on 5/26/23; I&D on 6/21/23 without liner exchanged  -GUERRERO drain in place growing MRSA, Corynebacterium, and Klebsiella (collected after drain was already established)  -6/21 intraop cultures: MRSA, C.acnes, and Pseudomonas  3. DM2  4. RA  5. Venous insufficiency  6. QTC = 421 -> 490 - > 492    Recommendations:  -- Continue IV vancomycin (pharmacy to dose)   -- Stop cipro; restart cefepime 2g Q8H   - Anticipate an initial 6 weeks of IV vancomycin therapy, followed by likely transition to oral anti-staph antibiotics   - Long-term antibiotic plan to be determined in outpatient ID follow up   - Weekly CBC w/diff, CMP, CRP, and vancomycin level while in IV therapy    **Given fluctuating renal function and persistently borderline high QTc, I would favor avoiding ciprofloxacin for now given chance of excessive drug exposure over the long-term management given changing renal function. Now that we have reliable long-term IV access cefepime seems more reasonable.**      Discussion:  Pt who was followed by ID team at  (last by Johnny Mercado and Shira on [facility]), transferred to [place] for KEILA, now back to [facility] on 6/20/23.      71yo M history of chronic left hip PJI, most recently s/p revision left BELLA on 5/26, who has been in TCU since discharge on 6/2, but then developed MRSA bacteremia with confusion and somnolence. CT hip without contrast showed  soft tissue stranding and multiple foci of subcutaneous emphysema about the left hip, especially anteriorly, partially visualized fluid collection within subcutaneous fat superficial-medial to the proximal adductor longus (caudal extent of which is incompletely visualized) which appears enlarged compared to outside CT 10/25/2022. Infection/abscess not excluded.  Blood cultures remained persistently positive for MRSA ( 6/14-6/17) but have been negative since 6/19/23. KEILA negative. Presumed hip infection and they obtained cultures from drain on 6/17/2023 - showing MRSA, Klebsiella, and Corynebacterium. Patient taken to OR for DAIR with Dr. Meyers on 6/21. OR report without any overt infection, but I did confirm with Dr. Meyers today that we should treat this as a prosthetic joint infection, which he agreed. Intraoperative cultures collected. Ceftriaxone added to vancomycin after I&D for coverage of Klebsiella (grown from drain). Intraop cultures without Klebsiella or Corynebacterium, but now showing Pseudomonas and Cutibacterium. Transitioned to cefepime, along with vancomycin to treat MRSA.    Will plan 6 weeks of IV vancomycin along with high-dose oral ciprofloxacin 750mg PO BID. Likely to discharge to TCU. Patient will follow up in ID clinic for ongoing antibiotic management thereafter.         ID will continue to follow.    Shelton Meredith MD  Infectious Diseases   [phone]        Interval History:   Afebrile. Talked to patient quite a while today, as he expressed his frustration with his overall health difficulties. QTc remains elevated at 492. Reports one loose stool per day.          Physical Exam:   Ranges for vital signs:  Temp:  [96.1  F (35.6  C)-98.1  F (36.7  C)] 98.1  F (36.7  C)  Pulse:  [72-81] 74  Resp:  [16-74] 16  BP: (127-133)/(37-61) 131/55  SpO2:  [94 %-99 %] 95 %    Intake/Output Summary (Last 24 hours) at 6/16/2023 1115  Last data filed at 6/15/2023 2239  Gross per 24 hour   Intake 50 ml   Output  --   Net 50 ml     Exam:  GENERAL: Well-developed, well-nourished. Much more awake today. Some memory difficulties.  ENT:  Head is normocephalic, atraumatic.   EYES:  Eyes have anicteric sclerae.    LUNGS:  Breathing comfortably  EXT: Extremities warm and without edema. Left hip with GUERRERO drain in place, serosanguinous fluid in line and collection vessel.  SKIN:  No acute rashes. Surgical site with no obvious erythema or purulence.  NEUROLOGIC:  Grossly nonfocal, though confused and somnolent         Laboratory Data:   Reviewed.  Pertinent for:    Microbiology:  Culture   Date Value Ref Range Status   06/21/2023 1+ Cutibacterium (Propionibacterium) acnes (A)  Final     Comment:     Susceptibility testing of Cutibacterium (Propionibacterium) species is not done from this source. This organism is susceptible to penicillin and cefotaxime and most are susceptible to clindamycin.   06/21/2023 No anaerobic organisms isolated  Final   06/21/2023 No anaerobic organisms isolated  Final   06/21/2023 No anaerobic organisms isolated  Final   06/21/2023 1+ Staphylococcus aureus (A)  Final     Comment:     Susceptibilities done on previous cultures   06/21/2023 1+ Pseudomonas aeruginosa (A)  Final   06/21/2023 1+ Staphylococcus aureus MRSA (A)  Final   06/21/2023 2+ Staphylococcus aureus (A)  Final     Comment:     Susceptibilities done on previous cultures   06/21/2023 1+ Pseudomonas aeruginosa (A)  Final     Comment:     Susceptibilities done on previous cultures   06/21/2023 1+ Staphylococcus aureus (A)  Final     Comment:     Susceptibilities done on previous cultures   06/21/2023 2+ Staphylococcus aureus (A)  Final     Comment:     Susceptibilities done on previous cultures   06/21/2023 No anaerobic organisms isolated  Final   06/21/2023 2+ Staphylococcus aureus MRSA (A)  Final   06/20/2023 No Growth  Final   06/20/2023 No Growth  Final   06/19/2023 No Growth  Final   06/19/2023 No Growth  Final   06/17/2023 4+ Staphylococcus  aureus MRSA (A)  Final   06/17/2023 2+ Corynebacterium striatum (A)  Final     Comment:     Identification obtained by MALDI-TOF mass spectrometry research use only database. Test characteristics determined and verified by the Infectious Diseases Diagnostic Laboratory.   06/17/2023 1+ Klebsiella pneumoniae (A)  Final   06/17/2023 No anaerobic organisms isolated after 11 days  Preliminary   06/17/2023 Positive on the 1st day of incubation (A)  Final   06/17/2023 Staphylococcus aureus MRSA (AA)  Final     Comment:     1 of 2 bottlesSusceptibilities done on previous cultures   06/17/2023 Positive on the 1st day of incubation (A)  Final   06/17/2023 Staphylococcus aureus MRSA (AA)  Final     Comment:     1 of 2 bottlesSusceptibilities done on previous cultures   06/17/2023 <10,000 CFU/mL Urogenital marimar  Final   06/16/2023 Positive on the 1st day of incubation (A)  Final   06/16/2023 Staphylococcus aureus MRSA (AA)  Final     Comment:     1 of 2 bottlesSusceptibilities done on previous cultures   06/16/2023 Positive on the 1st day of incubation (A)  Final   06/16/2023 Staphylococcus aureus MRSA (AA)  Final     Comment:     1 of 2 bottlesSusceptibilities done on previous cultures   06/15/2023 Positive on the 1st day of incubation (A)  Final   06/15/2023 Staphylococcus aureus MRSA (AA)  Final     Comment:     2 of 2 bottlesSusceptibilities done on previous cultures   06/15/2023 Positive on the 1st day of incubation (A)  Final   06/15/2023 Staphylococcus aureus MRSA (AA)  Final     Comment:     2 of 2 bottlesSusceptibilities done on previous cultures   06/14/2023 Positive on the 1st day of incubation (A)  Final   06/14/2023 Staphylococcus aureus MRSA (AA)  Final     Comment:     2 of 2 bottlesSusceptibilities done on previous cultures   06/14/2023 Positive on the 1st day of incubation (A)  Final   06/14/2023 Staphylococcus aureus MRSA (AA)  Final     Comment:     2 of 2 bottles     05/26/2023 No anaerobic organisms  isolated  Final   05/26/2023 No anaerobic organisms isolated  Final   05/26/2023 No anaerobic organisms isolated  Final   05/26/2023 No anaerobic organisms isolated  Final   05/26/2023 No Growth  Final   05/26/2023 No Growth  Final   05/26/2023 No Growth  Final   05/26/2023 No Growth  Final   05/26/2023 No anaerobic organisms isolated  Final   05/26/2023 No Growth  Final   04/07/2023 No anaerobic organisms isolated  Final   04/07/2023 No Growth  Final   04/07/2023 No anaerobic organisms isolated  Final   04/07/2023 No Growth  Final   04/07/2023 No anaerobic organisms isolated  Final   04/07/2023 No Growth  Final   04/07/2023 No anaerobic organisms isolated  Final   04/07/2023 No Growth  Final   04/07/2023 No anaerobic organisms isolated  Final   04/07/2023 No Growth  Final   04/07/2023 No anaerobic organisms isolated  Final   04/07/2023 No Growth  Final   11/22/2022 4+ Finegoldia magna (A)  Final     Comment:     Susceptibilities done on previous cultures   11/22/2022 No Growth  Final   11/22/2022 1+ Proteus mirabilis (A)  Final     Comment:     Susceptibilities done on previous cultures   11/22/2022 1+ Finegoldia magna (A)  Final     Comment:     Susceptibilities done on previous cultures   11/22/2022 No Growth  Final   11/22/2022 1+ Proteus mirabilis (A)  Final   11/22/2022 No anaerobic organisms isolated  Final   11/22/2022 No anaerobic organisms isolated  Final   11/22/2022 No anaerobic organisms isolated  Final   11/22/2022 No Growth  Final   11/22/2022 No Growth  Final   11/22/2022 No Growth  Final   11/22/2022 Isolated in broth only Proteus mirabilis (A)  Final     Comment:     On day 1 of incubationSusceptibilities done on previous cultures   11/22/2022 Isolated in broth only Proteus mirabilis (A)  Final     Comment:     On day 1 of incubationSusceptibilities done on previous cultures   11/22/2022 1+ Proteus mirabilis (A)  Final   11/22/2022 No anaerobic organisms isolated  Final   11/22/2022 No Growth   Final   11/22/2022 1+ Proteus mirabilis (A)  Final     Comment:     Susceptibilities done on previous cultures   11/14/2022 4+ Porphyromonas species (A)  Final     Comment:     Beta Lactamase Positive  Identification obtained by MALDI-TOF mass spectrometry research use only database. Test characteristics determined and verified by the Infectious Diseases Diagnostic Laboratory.   11/14/2022 1+ Finegoldia magna (A)  Corrected   11/14/2022 3+ Proteus mirabilis (A)  Final   11/14/2022 No Growth  Final     Metabolic Studies       Recent Labs   Lab Test 06/29/23  0809 06/29/23  0720 06/29/23  0158 06/28/23  2221 06/28/23  1748 06/28/23  1110 06/28/23  0736 06/28/23  0658 06/26/23  0738 06/26/23  0513 06/25/23  0812 06/25/23  0615 06/24/23  1405 06/24/23  1340 06/24/23  0801 06/24/23  0654 06/23/23  0916 06/23/23  0840 06/16/23  0820 06/16/23  0605 03/21/23  0810 03/20/23  1919 01/10/23  1710 01/10/23  1105   NA  --  140  --   --   --   --   --  138  --  139  --  139  --  137  --  137  --  137   < > 141   < >  --    < >  --    POTASSIUM  --  4.3  --   --   --   --   --  4.3  --  4.7  --  4.6  --   --   --  4.5  --  4.7   < > 4.2   < >  --    < >  --    CHLORIDE  --  110*  --   --   --   --   --  109*  --  110*  --  108*  --   --   --  106  --  105   < > 104   < >  --    < >  --    CO2  --  22  --   --   --   --   --  20*  --  20*  --  22  --   --   --  22  --  22   < > 24   < >  --    < >  --    ANIONGAP  --  8  --   --   --   --   --  9  --  9  --  9  --   --   --  9  --  10   < > 13   < >  --    < >  --    BUN  --  39.2*  --   --   --   --   --  39.8*  --  42.8*  --  46.0*  --   --   --  50.2*  --  50.2*   < > 27.7*   < >  --    < >  --    CR  --  1.98*  --   --   --   --   --  1.59*  1.59*  --  1.42*  --  1.65*  --  1.84*  --  2.05*  --  2.32*   < > 1.14   < >  --    < >  --    GFRESTIMATED  --  35*  --   --   --   --   --  46*  46*  --  53*  --  44*  --   --   --  34*  --  29*   < > 69   < >  --    < >  --    GLC  110* 111* 120* 146* 109* 109*   < > 118*   < > 134*   < > 142*   < >  --    < > 166*   < > 132*   < > 171*   < >  --    < >  --    A1C  --   --   --   --   --   --   --   --   --   --   --   --   --   --   --   --   --   --   --  5.6  --   --    < >  --    MAIKOL  --  7.7*  --   --   --   --   --  8.1*  --  7.6*  --  7.5*  --   --   --  7.2*  --  7.2*   < > 8.3*   < >  --    < >  --    PHOS  --   --   --   --   --   --   --   --   --  3.7  --  3.9  --   --   --  4.2  --  4.7*   < >  --   --   --   --   --    MAG  --   --   --   --   --   --   --   --   --   --   --  1.8  --   --   --  1.7  --  1.9   < > 1.7   < >  --   --   --    LACT  --   --   --   --   --   --   --   --   --   --   --   --   --   --   --   --   --   --   --   --   --  1.3  --  0.9    < > = values in this interval not displayed.     Hepatic Studies    Recent Labs   Lab Test 06/26/23  0513 06/25/23  0615 06/24/23  0654 06/23/23  0840 06/22/23  1341 06/22/23  0711 06/20/23  0704 06/19/23  0915 06/16/23  0605 06/14/23  0651 05/31/23  0630 05/26/23  0753   BILITOTAL  --   --   --   --   --   --  0.2 0.2 0.2 0.2 0.2 0.3   ALKPHOS  --   --   --   --   --   --  93 94 114 110 74 92   ALBUMIN 2.2* 2.3* 2.4* 2.4* 2.4* 2.2* 2.3* 2.6* 2.8* 2.8* 3.2* 3.7   AST  --   --   --   --   --   --  14 19 16 16 14 10   ALT  --   --   --   --   --   --  14 12 10 8 6* 6*     Hematology Studies      Recent Labs   Lab Test 06/26/23  0513 06/25/23  0615 06/24/23  0654 06/23/23  0840 06/22/23  1926 06/22/23  1341 06/22/23  0711 06/21/23  0800   WBC  --  13.9* 14.9* 14.0*  --  18.1* 17.8* 10.1   HGB 7.6* 6.8* 7.3* 7.5* 7.5* 6.7* 5.3* 7.3*   HCT  --  22.1* 23.0* 23.6*  --  21.4* 17.6* 24.1*   PLT  --  310 290 312  --  367 347 354     Arterial Blood Gas Testing    Recent Labs   Lab Test 06/15/23  1325 11/26/22  0945 11/22/22  1925 11/22/22  1644   PH 7.39  --   --   --    PCO2 44  --   --   --    PO2 83  --   --   --    HCO3 26  --   --   --    O2PER 2 32 40.0 47.0          Latest  Ref Rng & Units 6/29/2023     7:20 AM 6/28/2023     6:58 AM 6/26/2023     5:13 AM 6/25/2023     6:15 AM 6/24/2023     1:40 PM   Transplant Immunosuppression Labs   Creat 0.67 - 1.17 mg/dL 1.98  1.59     1.59  1.42  1.65  1.84    Urea Nitrogen 8.0 - 23.0 mg/dL 39.2  39.8  42.8  46.0     WBC 4.0 - 11.0 10e3/uL    13.9            Imaging:   XR Chest Port 1 View  Result Date: 6/14/2023  IMPRESSION: Negative portable view. If further detail is needed comment consider upright PA and lateral examination when clinical condition permits. TAE MORRIS MD   SYSTEM ID:  [id number]    XR Abdomen 1 View  Result Date: 6/10/2023  IMPRESSION: Nonobstructive bowel gas pattern. Large stool burden. I have personally reviewed the examination and initial interpretation and I agree with the findings. JONNATHAN COLEMAN MD   SYSTEM ID:  [id number]

## 2023-06-29 NOTE — PLAN OF CARE
"VS: /55 (BP Location: Left arm, Patient Position: Semi-Fuentes's, Cuff Size: Adult Large)   Pulse 74   Temp 98.1  F (36.7  C) (Oral)   Resp 16   Ht 1.778 m (5' 10\")   Wt 126 kg (277 lb 12.5 oz)   SpO2 95%   BMI 39.86 kg/m     O2: O2> 95% ORA, denies feeling SOB, lightheadesness  Lungs clear, equal bilateral    Output: Voids using bedside urinal - strains to void, frequently voids small amounts   Last BM: 6/28 per pt report    Activity: Ax2-3 with lift device and bed cares  Attempted to work w/ therapy, Ax2 w/ lift   Up for meals? Sits up for meals   Skin: L hip incision. Scattered bruising to BUE, swelling to BLE - refuses for anyone to touch L foot   Pain: Managed with PRN Tylenol and Tramadol.  Robaxin discontinued by provider   CMS: LLE numbness due to foot drop per pt report. A&O x4   Dressing: LLE dressing CDI   Diet: Reg, denies nausea/vomiting   On glucose checks before meals    LDA: L PIV forearm - SL  R CVC- heparin locked in between IV antibiotics    Plan: Continue POC for pain management and intermittent antibiotics - pending possible TCU discharge    Additional Info: On RN managed potassium - did not have to be replaced today      Lab called w/ high vanco level - MD notified      Pt reported to author that he lost his phone 2 days ago during IR procedure. States he had given his phone to transport and has not seen it since   Author contacted security, IR, visited the transport office (no one present) and unable to find phone  Pt relations card given to pt  "

## 2023-06-29 NOTE — PROGRESS NOTES
"Orthopedic Surgery Progress Note: 06/29/2023    Subjective:   No acute events overnight. AVSS. No movement; I encouraged him to move. Transitioned to cipro by ID. Assist of 3. Got PICC yesterday. Dispo planning ongoing.     Objective:   BP (!) 127/37 (BP Location: Left arm)   Pulse 75   Temp 98.1  F (36.7  C) (Oral)   Resp 16   Ht 1.778 m (5' 10\")   Wt 126 kg (277 lb 12.5 oz)   SpO2 95%   BMI 39.86 kg/m    No intake/output data recorded.  General: NAD. Resting comfortably in bed.  Respiratory: Breathing comfortably on RA.  Drain Output:  Output by Drain (mL) 06/27/23 0700 - 06/27/23 1459 06/27/23 1500 - 06/27/23 2259 06/27/23 2300 - 06/28/23 0659 06/28/23 0700 - 06/28/23 1459 06/28/23 1500 - 06/28/23 2259 06/28/23 2300 - 06/29/23 0643   Closed/Suction Drain Anterior;Left Thigh Bulb 15 [place]     50    Musculoskeletal: Focused exam of the left lower extremity: Drain in place. Sanguinous drainage on dressings. Fires GSC, TA, FHL, and EHL minimally. Incredibly sensitive to touch on feet. Overall decreased sensation distally. Brisk capillary refill.     Laboratory Data:  Lab Results   Component Value Date    WBC 13.9 (H) 06/25/2023    HGB 7.6 (L) 06/26/2023     06/25/2023      Intraoperative cultures   6/21/2023: MRSA, C Acnes     Assessment & Plan:   Waldo Bustos is a 72 year old male status post left revision total hip arthroplasty on 5/26/2023 with Dr. Meyers now presenting with left hip prosthetic joint infection status post left hip irrigation and debridement on 6/21/2023 with Dr. Meyers.    CRP downtrending. C acnes, MRSA in cultures. Treating with vanc/cipro but may switch back to cefepime, per ID recs. Appreciate assistance.    Updated activity orders but still on bedrest for PICC - defer to primary team and IR but would resume WBAT LLE and PHP when able.    Goals for Today:  - Pain control and mobilization - appreciate therapies recs  - OK for nursing to remove and replace absorptive dressing " over drain site as needed  - Discharge planning    Ortho Plan:  - Activity: Up with assist until independent. Posterior hip precautions x 3 months (no flexion beyond 90 degrees, no adduction beyond midline, and no internal rotation beyond midline).  - Weightbearing Status: WBAT LLE.  - Pain Management: Transition from IV to PO as tolerated.  - Antibiotics: Vancomycin and cipro, per ID.  - Diet: OK for a diet from an orthopedic perspective.  - DVT Prophylaxis: SCDs and PTA apixaban 5 mg BID.  - Imaging: None pending.  - Labs: Labs PRN.  - Bracing/Splinting: Abduction pillow while in bed.  - Dressings: Keep dressing C/D/I x 2 weeks.  - Drains: GUERRERO drain in place (remove in 2 weeks).  - Tran Catheter: None.   - Physical Therapy/Occupational Therapy: Evaluation and treatment.  - Cultures: Intraoperative cultures 6/21/2023.  - Follow-up: Clinic with Dr. Meyers's team in 2 weeks without radiographs.    - Disposition: Pending progress with therapies, pain control on orals, and medical stability; anticipate discharge to TCU with timing TBD.    Orthopedic Surgery staff for this patient is Dr. Meyers.    ------------------------------------------------------------------------------------------    Hannah Starr MD, PGY-4  Orthopedics

## 2023-06-29 NOTE — PROGRESS NOTES
CLINICAL NUTRITION SERVICES - REASSESSMENT NOTE     Nutrition Prescription    RECOMMENDATIONS FOR MDs/PROVIDERS TO ORDER:  Please obtain new weight as able  Encourage pt to take scheduled bowel regimen    Malnutrition Status:    .Severe malnutrition in the context of acute on chronic illness    Recommendations already ordered by Registered Dietitian (RD):  Nutrition education for nutrition relationship to health/disease   Communicated pt preferences with kitchen    Future/Additional Recommendations:  Monitor labs, intakes, and weight trends.     EVALUATION OF THE PROGRESS TOWARD GOALS   Diet: Regular  Intake/[place]: 50 - 100% of documented meals.  Snacks/supplements: Glucerna daily    Pt receiving (on average) 1200 kcal and 56 g protein per day per [employer]. With documented intakes, pt is likely meeting ~50% minimum energy and protein needs.     NEW FINDINGS/REVIEW OF SYSTEMS    Nutrition/GI: RD met with pt and brother at bedside. Discussed importance of eating for healing. Pt with 4-5 Glucerna and one Ensure in room. Pt would like this paused until he finishes them. Encouraged pt to ask any visitors for foods he is likely to eat. Asked pt and brother to fill out menu of items he enjoys so that the kitchen may send up the food he is more likely to eat. Pt reports it has been a couple days since his last BM. Encouraged pt to drink enough water and take medications to help with bowel movements.     Weights: Pt with limited weight history prior to admission, with 81 lb (25%) weight loss over 6 months, with 34 lb (14%) weight gain in 1 month during admission likely fluid related.   Wt Readings from Last Encounters:   06/28/23 126 kg (277 lb 12.5 oz)   06/16/23 115.2 kg (254 lb)   05/26/23 110.4 kg (243 lb 4.8 oz)   05/19/23 110.4 kg (243 lb 4.8 oz)   03/23/23 117 kg (257 lb 14.4 oz)   01/09/23 119.7 kg (264 lb)   11/22/22 147.1 kg (324 lb 4.8 oz)   11/14/22 142.9 kg (315 lb)   11/09/22 146.8 kg (323 lb 9.6 oz)    05/19/22 136.1 kg (300 lb)   07/03/18 148.9 kg (328 lb 4.8 oz)     Skin: hip surgical incision, sacral/coccyx wounds (?) pt refuses assessment     Labs reviewed   06/25/23 06:15 06/26/23 05:13 06/28/23 06:58 06/29/23 07:20   Sodium 139 139 138 140   Potassium 4.6 4.7 4.3 4.3   Urea Nitrogen 46.0 (H) 42.8 (H) 39.8 (H) 39.2 (H)   Creatinine 1.65 (H) 1.42 (H) 1.59 (H)  1.59 (H) 1.98 (H)   Magnesium 1.8      Phosphorus 3.9 3.7     Albumin 2.3 (L) 2.2 (L)     CRP Inflammation 17.80 (H) 15.75 (H)  24.72 (H)   Ferritin  2,616 (H)     Glucose 142 (H) 134 (H) 118 (H) 111 (H)   Iron  30 (L)     Iron Binding Capacity  146 (L)     Iron Sat Index  21     Transferrin  114.0 (L)     Vitamin B12  601        Medications reviewed: colace, ferrous sulfate, lasix, LR, culturell, multivitamin, miralax last given 6/24, senna last given 6/26, vancomycin    MALNUTRITION  % Intake: </= 50% for >/= 5 days (severe)  % Weight Loss: > 10% in 6 months (severe)   Subcutaneous Fat Loss: Upper arm:  moderate  Muscle Loss: Temporal:  Moderate, Thoracic region (clavicle, acromium bone, deltoid, trapezius, pectoral):  Severe, Upper arm (bicep, tricep):  Severe and Posterior calf:  Severe   Fluid Accumulation/Edema: Mild  Malnutrition Diagnosis: Severe malnutrition in the context of acute on chronic illness    Previous Goals   Patient to consume % of nutritionally adequate meal trays TID, or the equivalent with supplements/snacks.  Evaluation: Not met    Previous Nutrition Diagnosis  Inadequate oral intake related to poor appetite, dislike of hospital foods as evidenced by patient report and documented intakes    Evaluation: Improving    CURRENT NUTRITION DIAGNOSIS  Inadequate oral intake related to poor appetite, dislike of hospital foods, multiple days of NPO status, as evidenced by patient report and documented intakes      INTERVENTIONS  Implementation  Nutrition education for nutrition relationship to health/disease   Communicated pt  preferences with kitchen    Goals  Patient to consume % of nutritionally adequate meal trays TID, or the equivalent with supplements/snacks.    Monitoring/Evaluation  Progress toward goals will be monitored and evaluated per protocol.    Dee Alanis MS, RDN, LDN  RD pager: [phone]  [facility] Weekend/Holiday Pager: [phone]

## 2023-06-29 NOTE — PHARMACY-VANCOMYCIN DOSING SERVICE
Pharmacy Vancomycin Note  Date of Service 2023  Patient's  1950   72 year old, male    Indication: MRSA Bacteremia, vancomycin MARCOS </= 0.5   Day of Therapy: started 6/15/23  Current vancomycin regimen:  500 mg IV q18h x1 dose given - now changed to intermittent dosing   Current vancomycin monitoring method: AUC  Current vancomycin therapeutic monitoring goal: 400-600 mg*h/L    InsightRX Prediction of Current Vancomycin Regimen    Regimen: 500 mg IV every 18 hours.  Start time: 15:31 on 2023  Exposure target: AUC24 (range)400-600 mg/L.hr   AUC24,ss: 579 mg/L.hr - please note this prediction is not accurate d/t HALEY, AUC would be higher d/t accumulation/not clearing  Probability of AUC24 > 400: 100 %  Ctrough,ss: 22.6 mg/L  Probability of Ctrough,ss > 20: 91 %  Probability of nephrotoxicity (Lodise HAJA ): 22 %    Current estimated CrCl = Estimated Creatinine Clearance: 45.6 mL/min (A) (based on SCr of 1.98 mg/dL (H)).    Creatinine for last 3 days  2023:  6:58 AM Creatinine 1.59 mg/dL;  6:58 AM Creatinine 1.59 mg/dL  2023:  7:20 AM Creatinine 1.98 mg/dL    Recent Vancomycin Levels (past 3 days)  2023:  6:58 AM Vancomycin 26.1 ug/mL  2023:  7:20 AM Vancomycin 26.4 ug/mL - at ~16 hours    Vancomycin IV Administrations (past 72 hours)                   vancomycin (VANCOCIN) 500 mg vial to attach to  mL bag (mg) 500 mg New Bag 23 1531    vancomycin (VANCOCIN) 750 mg in sodium chloride 0.9 % 250 mL intermittent infusion (mg) 750 mg New Bag 23 1526     750 mg New Bag 23 1507                Nephrotoxins and other renal medications (From now, onward)    Start     Dose/Rate Route Frequency Ordered Stop    23 0813  vancomycin place montilla - receiving intermittent dosing         1 each Intravenous SEE ADMIN INSTRUCTIONS 23 0813      23 0930  furosemide (LASIX) tablet 60 mg         60 mg Oral 2 TIMES DAILY (Diuretics and Nitrates) 23 9424       06/21/23 1400  sodium chloride 0.9 % 18.4 mL with ropivacaine (NAROPIN) 5 MG/ mg, EPINEPHrine (ADRENALIN) 600 mcg, ketorolac (TORADOL) 30 mg          INTRA-ARTICULAR ONCE 06/21/23 1354               Contrast Orders - past 72 hours (72h ago, onward)    None          Interpretation of levels and current regimen:  Vancomycin level is reflective of level too high to give another dose this AM    Has serum creatinine changed greater than 50% in last 72 hours: Yes    Urine output:  diminished urine output    Renal Function: Worsening      Plan:  1. Change to intermittent dosing per levels d/t HALEY. No dose today. Repeat vanco level tomorrow AM w/ AM labs and dose if vanco level <20.   2. Vancomycin monitoring method: Trough (Method 2 = manual dose calculation)  3. Vancomycin therapeutic monitoring goal: 15-20 mg/L  4. Pharmacy will check vancomycin levels as appropriate in 6/30 AM labs.  5. Serum creatinine levels will be ordered daily at the moment d/t HALEY.    Michael Sosa [facility]

## 2023-06-29 NOTE — CONSULTS
"Palliative Care Consultation Note  [facility]      Patient: Waldo Bustos  Date of Admission:  6/17/2023    Requesting Clinician / Team: Hospitalist service  Reason for consult: Goals of care     Recommendations & Counseling     GOALS OF CARE:     Rahat states that he is hoping to \"get the hell out of here\".  He wishes he could be anywhere else and ideally he wants to go home.     Don wants to get better and go home.    Don tells me that he \"understands the future\" and \"can read between the lines\" and knows that everyone thinks he is dying.  He has read his medical record and he feels that he is trying as hard as he can to participate in physical therapy and he believes that it is not true that he is refusing to participate and he feels angry that it is written in his chart that he is refusing to participate because he is not.    He tells me that he has gotten weaker in the past 6 months and at times he feels like he is under too much medicine and not able to think clearly or even know where he is.    ADVANCE CARE PLANNING:    No advanced directive on file      Code status: Full Code    MEDICAL MANAGEMENT:     - pt complains of feeling \"out of it\" and a times feels like \"he is under so much medication that he doesn't know where he is or who he is\".  Because of this recommend stopping all medications that could be contributing to his mental fogginess.   - stop robaxin  - would minimize medication that can cause delirium and/or make patient feel groggy and out of it including anticholinergics, antihistamines, opioids. Would keep shades up during the day and get patient out of bed during the day.   - Pain control: Continue as needed Tylenol and tramadol.  Consider making tramadol every 8 hours as needed in order to minimize medications that can contribute in patient feeling groggy.  - Don told me that at times he feels very frustrated as this has been a hard and long " "hospital course and sometimes it feels that things are getting worse by the moment.  He told me that the hardest thing now is living with the idea that there does not seem to be any \"next steps\".    Consider psychiatry consult to determine capacity and assess mood. Pt displayed a few paranoid thoughts during our encounter and would appreciate psychiatry assessment for capacity and mood.     PSYCHOSOCIAL/SPIRITUAL:    Patient has the support of his brother who he talks to on the phone and visits him in the hospital.  He also feels very well supported by some of the nursing staff.    Our team: does not plan on following further, however do not hesitate to call or re-consult if we can be of further assistance to the patient/family.  Thank you for the consult and allowing us to aid in the care of Waldo Bustos.    These recommendations have been discussed with Dr. Frazier.    Jillian Godoy MD  Securely message with TEAM INTERVAL (more info)  Text page via [employer] Paging/Directory       During regular M-F work hours -- please contact team via Securely message with the Vocera Web Console (learn more here)    After regular work hours and on weekends/holidays, you can call our answering service at [phone]. Also, who's on call for us is available in [employer] Smart Web.     Total time spent was 80 minutes regarding goals of care, plan of care on the date of the encounter.     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  Postop course complicated by profound deconditioning.  He was admitted to TCU initially on 12/23/2022 for physical rehabilitation, however therapies no longer worked with patient " after several months of an in bed therapy and his refusals to mobilize.  Underwent biopsy on 4/7/2023 without evidence of infection.  He was transferred back to [facility] for left total hip arthroplasty and reimplantation on 5/26/23 with Dr. Meyers.  Developed persistent MRSA bacteremia on 6/17.  KEILA was obtained on 6/19, which was negative for endocarditis, ortho took for washout/debridement of hip on 6/21. ID recommending 6 weeks abx (6/21-8/1), patient recovering from HALEY, but given history of HALEY's and need for long term vanc plus comorbids making declining kidney function in the future probable nephrology doesn't feel comfortable with patient getting a PICC -- Tunneled internal jugular line placed. Transitioned cefepime to cipro, continue vancomycin. ID note from 6/29 - Given fluctuating renal function and persistently borderline high QTc, I would favor avoiding ciprofloxacin for now given chance of excessive drug exposure over the long-term management given changing renal function. Now that we have reliable long-term IV access cefepime seems more reasonable.       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 I explained the composition of the palliative care team.  Palliative care helps patients and families navigate their care while focusing on the whole person; providing emotional, social and spiritual support  Palliative care often assists with symptom management, information sharing about what to expect from the illness, available treatment options and what effect those options may have on the disease course, and  "provide effective communication and caring support.    Throughout our encounter today, Rahat insisted that palliative care was the same thing as hospice care.  I did tell Rahat that many people believe hospice care is the same thing as palliative care but I tried to differentiate hospice care from the reason I was visiting him today.     Don told me that he understands the future and \"can read between the lines and knows that he is dying\".He states that he has gotten worse and weaker over the past 6 months.  He says he wants to go home and he feels very angry that people are writing in the chart that he is refusing to participate in physical therapy because he denies ever refusing to participate in therapy.  He also stated that he is not going to medicate himself just because people tell him to take medications.      Prognosis, Goals, & Planning:      Functional Status just prior to this current hospitalization:    Prior to his initial hospitalization 8 months ago, he was living at home with his girlfriend who he had been in a relationship with for over 30 years.  He states that he has not seen his girlfriend for the past 7 months because she \"has transitional problems\" and she has bad legs and a bad back and so they therefore have not seen each other for the past 7-month.      Prognosis, Goals, and/or Advance Care Planning:    See above conversation      Code Status was addressed today:     No      Patient's decision making preferences: not assessed          Patient has decision-making capacity today for complex decisions: Unclear to me as Don would not engage in a conversation with me about his medical problems and I therefore do not know if he has insight into his complex medical history.            Coping, Meaning, & Spirituality:     Mood, coping, and/or meaning in the context of serious illness were addressed today: Yes    Social:   Living situation:lives with significant other/spouse  Important " relationships/caregivers:Brother    Medications:  I have reviewed this patient's medication profile and medications from this hospitalization. Notable medications:     ROS:  Comprehensive ROS is reviewed and is negative except as here & per HPI:     Medications:  I have reviewed this patient's medication profile and medications from this hospitalization.     Physical Exam:  Vital Signs: Temp: 98.1  F (36.7  C) Temp src: Oral BP: 131/55 Pulse: 74   Resp: 16 SpO2: 95 % O2 Device: None (Room air)    Weight: 277 lbs 12.47 oz  Gen: Alert, lying in bed, appears stated age, tired, able to engage, flat affect, slow speech, frequently forgetting train of thought    Data reviewed:  Reviewed recent labs and pertinent imaging

## 2023-06-29 NOTE — PLAN OF CARE
VS: /42 (BP Location: Left arm, Patient Position: Semi-Fuentes's)   Pulse 81   Temp 97.8  F (36.6  C) (Oral)   Resp 16   SpO2 94%     O2: Stable on room air>90%. Denies SOB, chest pain   Output: Using urinal at bedside   Last BM: 6/28/23   Activity: Bedrest  Assist of 2-3 with turns and bed cares   Skin: Redness blanchable to sacrum/coccyx- barrier cream and mepilex in place  Left hip surgical incision  Swollen left foot   Pain: Chronic neck pain and left hip managed with Tramadol and Robaxin    CMS/Neuro: AxO x 4. Numbess to Bilat lower extremities    Dressing: L hip- CDI  GUERRERO dressing- dried drainage    Diet: Reg diet. BG checks and Insulin     LDA: Right PIV saline locked and Right Jugular CVC  GUERRERO drain   Equipment: PT belongings, commode, IV  Pole and pump   Plan: Pending Discharge to TCU   Additional Info:

## 2023-06-29 NOTE — PROGRESS NOTES
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  Postop course complicated by profound deconditioning.  He was admitted to TCU initially on 12/23/2022 for physical rehabilitation, however therapies no longer worked with patient after several months of an in bed therapy and his refusals to mobilize.  Underwent biopsy on 4/7/2023 without evidence of infection.  He was transferred back to [facility] for left total hip arthroplasty and reimplantation on 5/26/23 with Dr. Meyers.  Presented again to [facility] for persistent MRSA bacteremia on 6/17.     Brief Hospital Summary:   Pt was admitted to continue infectious workup with MRSA bacteremia.  Admission blood cultures were positive, ID and Ortho were consulted. KEILA was obtained on 6/19, which was negative for endocarditis, ortho took for washout/debridement of hip on 6/21. ID recommending 6 weeks abx (6/21-8/1), patient recovering from HALEY, but given history of HALEY's and need for long term vanc plus comorbids making declining kidney function in the future probable nephrology doesn't feel comfortable with patient getting a PICC -- Tunneled internal jugular line placed. Transitioned cefepime to cipro, continue vancomycin.     Plan for today:  - HALEY, repeat 500 ml bolus, if no improvement obtain urine studies, consider nephrology consult.   - ID monitoring QTc given initiation of cipro, ideally will continue cipro/vanc through 8/1. If he is unable to tolerate, could switch back to cefepime given line placement,  though this would necessitate 4 total doses of abx daily (1x vanc, 3x cefepime)  - Palliative care for GOC discussion    MRSA bacteremia due to possible left hip septic arthritis vs other source Blood cultures: 6/14, 6/15 + for MRSA (4/4), and 6/16 (1/2 GPCs) and 6/17 (2/2) GPCs. - started vancomycin 6/15.   6/17 sinovial fluid cultures with staph and klebsiella. Purulent drainage noted at incision site at TCU, CT left hip concerning for infection (no mention of joint, mostly anterior in soft tissue) and couldn't exclude abscess/inf  - Continue vancomycin, added rocephin 6/21 and changed to cefepime 6/23; ID recommends 6 weeks IV vanc (last day 8/1/23) and can transition to oral cipro 750 mg BID at discharge, needs weekly vanc level, cbcd, cmp, crp while on IV abx, regular ECG monitoring of QTc on high dose cipro --> follow up outpatient with ID prior to discontinuing antibiotics (needs appt end of July)  - blood cultures negative 6/19 and 6/20 (last + was 6/17), trend CRP q48h (way down since initiation of treatment and washout)  - intraop cultures are growing Staph, Pseudomonas, and C acnes    Acute Kidney Injury - multifactorial, see below  HALEY recurrent 6/29, repeat 500 ml bolus, if no improvement obtain urine studies, consider nephrology consult.   - baseline creatinine 1.0 or 1.1  - etiology: suspect combo of sepsis/inflammation, hypovolemia, blood loss anemia following surgery, vancomycin?  - volume (hypovolemia/congestion): euvolemic but tough to assess based on habitus  - nephrotoxins: vancomycin  - diabetes: yes; last A1C < 6, though  - sepsis: resolved now  - anemia: yes, ongoing  - cardiac performance: normal based on KEILA from 6/19  - inflammation: from surgery and infection  - creatinine downtrending; neph ordered C3 and 4, pending still (6/23)    Bilateral LE edema due to volume overload from HALEY and iatrogenic from blood transfusions  - started lasix 60 mg PO BID per nephrology, now holding due to  HALEY    Hypoxemic Respiratory Failure due to immobility, surgery - resolved (sena)  - patient with fluctuating oxygen requirement but off O2 since 6/23  - continue to follow; encourage IS    Acute blood loss anemia following surgery, complicated by use of blood thinner  - hgb 7.6 this morning; got 3 units day after surgery did joint washout (6/22) for acute blood loss anemia, 1 unit 6/25  - watch for bleeding; hold apixaban if need be - had unprovoked dvt in 2018 and would be on eliquis for life probably, at really high risk of recurrent DVT given recent procedures and illness/immobility  - I added on some iron studies and B12 level today; interpret with caution given transfusions; probably wouldn't hurt to supplement iron and folic acid given rapid turnover and need for transfusions, ordered to start tomorrow      S/p left hip explantation of left hip antibiotic spacer and revision of left total hip arthroplasty (5/26/2023)  - ortho performed washout 6/21; drain to come out in 2 weeks  -Activity: 50% PWB LLE with posterior hip precautions [id number]  -AFO ordered for foot drop but patient refusing to use  -PT/OT  -Knee to be kept even in a foam leg elevator to relax sciatic nerve  -Pain: Scheduled robaxin 4 times a day, APAP PRN                 -oxycodone  to 5-7.5 mg discontinued                 -tramadol 25-50 mg every 6 hours PRN    Hx of unprovoked DVT in 2018   - on eliquis 5 mg bid     Altered mental status due to Delirium from illness/hospitalization  resolved  - delirium precuations      Type 2 diabetes mellitus  - Last hemoglobin A1c 5.6% on 6/16/2023.  On glipizide, metformin, Actos, and Victoza prior to hospitalizations and surgery.   - continue ISS    Constipation  -Continue Colace 100 mg twice daily  -Continue senna 1 tablet twice daily  -Continue MiraLAX daily     Hypomagnesemia  -monitor and replete      Severe malnutrition in the context of acute on chronic illness  Goal for patient is to consume  "% of meals TID. Goal is 8595-5977 kcals/daily. Protein needs- 100-126 grams/daily.   -RD consult and appreciate recs  -regular diet  -Weekly weights, daily I/O     Adjustment disorder with depressed mood  Prolonged grief disorder  Patient has been with depressed mood in setting of ongoing health issues, as evidenced by lack of motivation to work with therapies during both TCU admissions.  Past history of passive suicidal statements. Psychiatry saw patient during hospital admission on 5/31 due to lack of motivation with therapies. Patient declined interest in starting any psychiatric medications. Was willing to engage with health psychology.   -Outpatient health psychology consult  -Continue to monitor for willingness to start antidepressant therapy     Stable and Resolved Issues:  Hyperlipidemia-continue PTA Lipitor 40 mg daily  JAIR-CPAP when sleeping         Diet: Snacks/Supplements Adult: Other; Chocholate or Butter Pecan Glucerna with bkf trays daily; With Meals  Regular Diet Adult    DVT Prophylaxis: DOAC  Tran Catheter: Not present  Lines: PRESENT      CVC Single Lumen Right Internal jugular Non - valved (open ended);Tunneled;Power injectable-Site Assessment: WDL      Cardiac Monitoring: None  Code Status: Full Code      Clinically Significant Risk Factors              # Hypoalbuminemia: Lowest albumin = 2.2 g/dL at 6/26/2023  5:13 AM, will monitor as appropriate    # Acute Kidney Injury, unspecified: based on a >150% or 0.3 mg/dL increase in last creatinine compared to past 90 day average, will monitor renal function         # Obesity: Estimated body mass index is 39.86 kg/m  as calculated from the following:    Height as of this encounter: 1.778 m (5' 10\").    Weight as of this encounter: 126 kg (277 lb 12.5 oz).   # Severe Malnutrition: based on nutrition assessment         Disposition Plan     Expected Discharge Date: 06/30/2023,  3:00 PM      Discharge Comments:  TCU          Caden Frazier, " MD  Hospitalist Service, GOLD TEAM 21  [facility]  Securely message with [employer] (more info)  Text page via [email or web address] Paging/Directory   See signed in provider for up to date coverage information  ______________________________________________________________________    Interval History   No acute events overnight. Patient is feeling overwhelmed with his illness, more tired today.     Physical Exam   Vital Signs: Temp: 98.1  F (36.7  C) Temp src: Oral BP: (!) 127/37 Pulse: 75   Resp: 16 SpO2: 95 % O2 Device: None (Room air)    Weight: 277 lbs 12.47 oz    Constitutional: sitting in bed, anxious appearing   CV: regular rate and rhythm, no murmurs rubs or gallops  Resp: clear to auscultation bilaterally  Abd: soft, non-tender, non-distended, no hepatosplenomegaly or masses palpated  Neuro: alert and oriented x 3, speech clear      Medical Decision Making       50 MINUTES SPENT BY ME on the date of service doing chart review, history, exam, documentation & further activities per the note.      Data     I have personally reviewed the following data over the past 24 hrs:    N/A  \   N/A   / N/A     140 110 (H) 39.2 (H) /  110 (H)   4.3 22 1.98 (H) \       Procal: N/A CRP: 24.72 (H) Lactic Acid: N/A         Imaging results reviewed over the past 24 hrs:   No results found for this or any previous visit (from the past 24 hour(s)).

## 2023-06-30 NOTE — PLAN OF CARE
Goal Outcome Evaluation:      Plan of Care Reviewed With: patient    Overall Patient Progress: improving    Outcome Evaluation: Patient with improving intakes with non-select trays being sent. Commended efforts but emphasized need for continued improvement in intakes. Provided kitchen with pt's most enjoyed foods to order when pt does not order himself.

## 2023-06-30 NOTE — PHARMACY-VANCOMYCIN DOSING SERVICE
Pharmacy Vancomycin Note  Date of Service 2023  Patient's  1950   72 year old, male    Indication:  MRSA bacteremia, vancomycin MARCOS </= 0.5  Day of Therapy: started 6/15/23  Current vancomycin regimen:  500 mg IV q18h, last dose given , held dose on  d/t worsening renal fxn  Current vancomycin monitoring method: Trough (Method 2 = manual dose calculation)  Current vancomycin therapeutic monitoring goal: 15-20 mg/L      Current estimated CrCl = Estimated Creatinine Clearance: 43.2 mL/min (A) (based on SCr of 2.09 mg/dL (H)).    Creatinine for last 3 days  2023:  6:58 AM Creatinine 1.59 mg/dL;  6:58 AM Creatinine 1.59 mg/dL  2023:  7:20 AM Creatinine 1.98 mg/dL  2023:  7:43 AM Creatinine 2.09 mg/dL    Recent Vancomycin Levels (past 3 days)  2023:  6:58 AM Vancomycin 26.1 ug/mL  2023:  7:20 AM Vancomycin 26.4 ug/mL  2023:  7:43 AM Vancomycin 21.0 ug/mL    Vancomycin IV Administrations (past 72 hours)                     vancomycin (VANCOCIN) 500 mg vial to attach to  mL bag (mg) 500 mg New Bag 23 1531    vancomycin (VANCOCIN) 750 mg in sodium chloride 0.9 % 250 mL intermittent infusion (mg) 750 mg New Bag 23 1526                    Nephrotoxins and other renal medications (From now, onward)      Start     Dose/Rate Route Frequency Ordered Stop    23 0813  vancomycin place montilla - receiving intermittent dosing         1 each Intravenous SEE ADMIN INSTRUCTIONS 23 0813      23 0930  [Held by provider]  furosemide (LASIX) tablet 60 mg        (Held by provider since Thu 2023 at 0849 by Caden Frazier MD.Hold Reason: Acute Kidney Injury)    60 mg Oral 2 TIMES DAILY (Diuretics and Nitrates) 23 0927                 Contrast Orders - past 72 hours (72h ago, onward)      None            Interpretation of levels and current regimen:  Vancomycin level is reflective of supratherapeutic level    Has serum creatinine changed  greater than 50% in last 72 hours: Yes    Urine output:  diminished urine output    Renal Function: Worsening      Plan:  Give 1,500 mg IV x1 dose. Opted for 1500mg (15 mg/kg using Adj. BW vs 2000mg=15mg/kg using Actual BW d/t HALEY). Will give dose and check level 7/2 AM, dosing per intermittent levels.   Vancomycin monitoring method: Trough (Method 2 = manual dose calculation)  Vancomycin therapeutic monitoring goal: 15-20 mg/L  Pharmacy will check vancomycin levels as appropriate in 1-3 Days.  Serum creatinine levels will be ordered  daily at the moment d/t HALEY .    Mike Denney RPH

## 2023-06-30 NOTE — PROGRESS NOTES
General ID Service [facility]: Follow Up Note      Patient:  Waldo Bustos, Date of birth 1950, Medical record number [id number]  Date of Visit:  June 19, 2023         Assessment and Recommendations:   Problem List:  1. MRSA bacteremia, suspected secondary to left hip PJI and associated soft tissue infection; KEILA negative for IE   -6/14 blood cultures: 4 of 4 bottles positive  -6/15 blood cultures: 4 of 4 bottles positive  -6/17 blood cultures: 2 of 4 bottles positive  -6/19 NGTD  -6/20 NGTD  2. Chronic left hip PJI, s/p 2 stage revision with re-implantation on 5/26/23; I&D on 6/21/23 without liner exchanged  -GUERRERO drain in place growing MRSA, Corynebacterium, and Klebsiella (collected after drain was already established)  -6/21 intraop cultures: MRSA, C.acnes, and Pseudomonas  3. DM2  4. RA  5. Venous insufficiency  6. QTC = 421 -> 490 - > 492    Recommendations:  -- Continue IV vancomycin (pharmacy to dose) and cefepime 2g Q8H   - Anticipate an initial 6 weeks of IV antibiotic therapy, followed by likely transition to oral antibiotics   - Given polymicrobial infection and multiple potential drug-drug interactions, will treat without adjunctive rifampin at this time   - Long-term antibiotic plan to be determined in outpatient ID follow up   - Weekly CBC w/diff, CMP, CRP, and vancomycin level while in IV therapy    **Given fluctuating renal function and persistently borderline high QTc, I would favor avoiding ciprofloxacin for now given chance of excessive drug exposure over the long-term management given changing renal function. Now that we have reliable long-term IV access cefepime seems more reasonable.**      Discussion:  Pt who was followed by ID team at  (last by Johnny Mercado and Shira on [facility]), transferred to [place] for KEILA, now back to [facility] on 6/20/23.      73yo M history of chronic left hip PJI, most recently s/p revision left BELLA on 5/26, who has been in TCU since discharge on  6/2, but then developed MRSA bacteremia with confusion and somnolence. CT hip without contrast showed soft tissue stranding and multiple foci of subcutaneous emphysema about the left hip, especially anteriorly, partially visualized fluid collection within subcutaneous fat superficial-medial to the proximal adductor longus (caudal extent of which is incompletely visualized) which appears enlarged compared to outside CT 10/25/2022. Infection/abscess not excluded.  Blood cultures remained persistently positive for MRSA ( 6/14-6/17) but have been negative since 6/19/23. KEILA negative. Presumed hip infection and they obtained cultures from drain on 6/17/2023 - showing MRSA, Klebsiella, and Corynebacterium. Patient taken to OR for DAIR with Dr. Meyers on 6/21. OR report without any overt infection, but I did confirm with Dr. Meyers today that we should treat this as a prosthetic joint infection, which he agreed. Intraoperative cultures collected. Ceftriaxone added to vancomycin after I&D for coverage of Klebsiella (grown from drain). Intraop cultures without Klebsiella or Corynebacterium, but now showing Pseudomonas and Cutibacterium. Transitioned to cefepime, along with vancomycin to treat MRSA.    Will plan 6 weeks of IV vancomycin for MRSA and C.acnes, and cefepime for Pseudomonas. Likely to discharge to TCU. Patient will follow up in ID clinic for ongoing antibiotic management thereafter. Could consider transition to ciprofloxacin down the road but would repeat QTc first.        ID will continue to follow.    Shelton Meredith MD  Infectious Diseases   [phone]        Interval History:   Afebrile. Clinically stable, but patient does seem more confused today. Palliative care consulted yesterday. Recommended psychiatry consult. Plan for transition to TCU. Has tunneled CVC in place for OPAT antibiotic use.         Physical Exam:   Ranges for vital signs:  Temp:  [96.3  F (35.7  C)-98.6  F (37  C)] 98.6  F (37  C)  Pulse:   [71-88] 71  Resp:  [14-16] 16  BP: (118-148)/(33-78) 118/33  SpO2:  [94 %-99 %] 96 %    Intake/Output Summary (Last 24 hours) at 6/16/2023 1115  Last data filed at 6/15/2023 2239  Gross per 24 hour   Intake 50 ml   Output --   Net 50 ml     Exam:  GENERAL: Well-developed, well-nourished. Awake and alert. But seems confused. Didn't remember who I was.  ENT:  Head is normocephalic, atraumatic.   EYES:  Eyes have anicteric sclerae.    LUNGS:  Breathing comfortably  EXT: Extremities warm and without edema. Left hip with GUERRERO drain in place, serosanguinous fluid in line and collection vessel.  SKIN:  No acute rashes. Surgical site with no obvious erythema or purulence.  NEUROLOGIC:  Grossly nonfocal, though confused and somnolent         Laboratory Data:   Reviewed.  Pertinent for:    Microbiology:  Culture   Date Value Ref Range Status   06/21/2023 1+ Cutibacterium (Propionibacterium) acnes (A)  Final     Comment:     Susceptibility testing of Cutibacterium (Propionibacterium) species is not done from this source. This organism is susceptible to penicillin and cefotaxime and most are susceptible to clindamycin.   06/21/2023 No anaerobic organisms isolated  Final   06/21/2023 No anaerobic organisms isolated  Final   06/21/2023 No anaerobic organisms isolated  Final   06/21/2023 1+ Staphylococcus aureus (A)  Final     Comment:     Susceptibilities done on previous cultures   06/21/2023 1+ Pseudomonas aeruginosa (A)  Final   06/21/2023 1+ Staphylococcus aureus MRSA (A)  Final   06/21/2023 2+ Staphylococcus aureus (A)  Final     Comment:     Susceptibilities done on previous cultures   06/21/2023 1+ Pseudomonas aeruginosa (A)  Final     Comment:     Susceptibilities done on previous cultures   06/21/2023 1+ Staphylococcus aureus (A)  Final     Comment:     Susceptibilities done on previous cultures   06/21/2023 2+ Staphylococcus aureus (A)  Final     Comment:     Susceptibilities done on previous cultures   06/21/2023 No  anaerobic organisms isolated  Final   06/21/2023 2+ Staphylococcus aureus MRSA (A)  Final   06/20/2023 No Growth  Final   06/20/2023 No Growth  Final   06/19/2023 No Growth  Final   06/19/2023 No Growth  Final   06/17/2023 4+ Staphylococcus aureus MRSA (A)  Final   06/17/2023 2+ Corynebacterium striatum (A)  Final     Comment:     Identification obtained by MALDI-TOF mass spectrometry research use only database. Test characteristics determined and verified by the Infectious Diseases Diagnostic Laboratory.   06/17/2023 1+ Klebsiella pneumoniae (A)  Final   06/17/2023 No anaerobic organisms isolated after 12 days  Preliminary   06/17/2023 Positive on the 1st day of incubation (A)  Final   06/17/2023 Staphylococcus aureus MRSA (AA)  Final     Comment:     1 of 2 bottlesSusceptibilities done on previous cultures   06/17/2023 Positive on the 1st day of incubation (A)  Final   06/17/2023 Staphylococcus aureus MRSA (AA)  Final     Comment:     1 of 2 bottlesSusceptibilities done on previous cultures   06/17/2023 <10,000 CFU/mL Urogenital marimar  Final   06/16/2023 Positive on the 1st day of incubation (A)  Final   06/16/2023 Staphylococcus aureus MRSA (AA)  Final     Comment:     1 of 2 bottlesSusceptibilities done on previous cultures   06/16/2023 Positive on the 1st day of incubation (A)  Final   06/16/2023 Staphylococcus aureus MRSA (AA)  Final     Comment:     1 of 2 bottlesSusceptibilities done on previous cultures   06/15/2023 Positive on the 1st day of incubation (A)  Final   06/15/2023 Staphylococcus aureus MRSA (AA)  Final     Comment:     2 of 2 bottlesSusceptibilities done on previous cultures   06/15/2023 Positive on the 1st day of incubation (A)  Final   06/15/2023 Staphylococcus aureus MRSA (AA)  Final     Comment:     2 of 2 bottlesSusceptibilities done on previous cultures   06/14/2023 Positive on the 1st day of incubation (A)  Final   06/14/2023 Staphylococcus aureus MRSA (AA)  Final     Comment:     2 of 2  bottlesSusceptibilities done on previous cultures   06/14/2023 Positive on the 1st day of incubation (A)  Final   06/14/2023 Staphylococcus aureus MRSA (AA)  Final     Comment:     2 of 2 bottles     05/26/2023 No anaerobic organisms isolated  Final   05/26/2023 No anaerobic organisms isolated  Final   05/26/2023 No anaerobic organisms isolated  Final   05/26/2023 No anaerobic organisms isolated  Final   05/26/2023 No Growth  Final   05/26/2023 No Growth  Final   05/26/2023 No Growth  Final   05/26/2023 No Growth  Final   05/26/2023 No anaerobic organisms isolated  Final   05/26/2023 No Growth  Final   04/07/2023 No anaerobic organisms isolated  Final   04/07/2023 No Growth  Final   04/07/2023 No anaerobic organisms isolated  Final   04/07/2023 No Growth  Final   04/07/2023 No anaerobic organisms isolated  Final   04/07/2023 No Growth  Final   04/07/2023 No anaerobic organisms isolated  Final   04/07/2023 No Growth  Final   04/07/2023 No anaerobic organisms isolated  Final   04/07/2023 No Growth  Final   04/07/2023 No anaerobic organisms isolated  Final   04/07/2023 No Growth  Final   11/22/2022 4+ Finegoldia magna (A)  Final     Comment:     Susceptibilities done on previous cultures   11/22/2022 No Growth  Final   11/22/2022 1+ Proteus mirabilis (A)  Final     Comment:     Susceptibilities done on previous cultures   11/22/2022 1+ Finegoldia magna (A)  Final     Comment:     Susceptibilities done on previous cultures   11/22/2022 No Growth  Final   11/22/2022 1+ Proteus mirabilis (A)  Final   11/22/2022 No anaerobic organisms isolated  Final   11/22/2022 No anaerobic organisms isolated  Final   11/22/2022 No anaerobic organisms isolated  Final   11/22/2022 No Growth  Final   11/22/2022 No Growth  Final   11/22/2022 No Growth  Final   11/22/2022 Isolated in broth only Proteus mirabilis (A)  Final     Comment:     On day 1 of incubationSusceptibilities done on previous cultures   11/22/2022 Isolated in broth only  Proteus mirabilis (A)  Final     Comment:     On day 1 of incubationSusceptibilities done on previous cultures   11/22/2022 1+ Proteus mirabilis (A)  Final   11/22/2022 No anaerobic organisms isolated  Final   11/22/2022 No Growth  Final   11/22/2022 1+ Proteus mirabilis (A)  Final     Comment:     Susceptibilities done on previous cultures   11/14/2022 4+ Porphyromonas species (A)  Final     Comment:     Beta Lactamase Positive  Identification obtained by MALDI-TOF mass spectrometry research use only database. Test characteristics determined and verified by the Infectious Diseases Diagnostic Laboratory.   11/14/2022 1+ Finegoldia magna (A)  Corrected   11/14/2022 3+ Proteus mirabilis (A)  Final   11/14/2022 No Growth  Final     Metabolic Studies       Recent Labs   Lab Test 06/30/23  1315 06/30/23  1122 06/30/23  0748 06/30/23  0743 06/30/23  0158 06/29/23  2240 06/29/23  1703 06/29/23  1219 06/29/23  0809 06/29/23  0720 06/28/23  0736 06/28/23  0658 06/26/23  0738 06/26/23  0513 06/25/23  0812 06/25/23  0615 06/24/23  1405 06/24/23  1340 06/24/23  0801 06/24/23  0654 06/23/23  0916 06/23/23  0840 06/16/23  0820 06/16/23  0605 03/21/23  0810 03/20/23  1919 01/10/23  1710 01/10/23  1105   NA  --   --   --   --   --   --   --   --   --  140  --  138  --  139  --  139  --  137  --  137  --  137   < > 141   < >  --    < >  --    POTASSIUM 4.5  --   --  4.3  --   --   --   --   --  4.3  --  4.3  --  4.7  --  4.6  --   --   --  4.5  --  4.7   < > 4.2   < >  --    < >  --    CHLORIDE  --   --   --   --   --   --   --   --   --  110*  --  109*  --  110*  --  108*  --   --   --  106  --  105   < > 104   < >  --    < >  --    CO2  --   --   --   --   --   --   --   --   --  22  --  20*  --  20*  --  22  --   --   --  22  --  22   < > 24   < >  --    < >  --    ANIONGAP  --   --   --   --   --   --   --   --   --  8  --  9  --  9  --  9  --   --   --  9  --  10   < > 13   < >  --    < >  --    BUN  --   --   --   --   --   --    --   --   --  39.2*  --  39.8*  --  42.8*  --  46.0*  --   --   --  50.2*  --  50.2*   < > 27.7*   < >  --    < >  --    CR  --   --   --  2.09*  --   --   --   --   --  1.98*  --  1.59*  1.59*  --  1.42*  --  1.65*  --  1.84*  --  2.05*  --  2.32*   < > 1.14   < >  --    < >  --    GFRESTIMATED  --   --   --  33*  --   --   --   --   --  35*  --  46*  46*  --  53*  --  44*  --   --   --  34*  --  29*   < > 69   < >  --    < >  --    GLC  --  223* 116*  --  115* 117* 141* 113*   < > 111*   < > 118*   < > 134*   < > 142*   < >  --    < > 166*   < > 132*   < > 171*   < >  --    < >  --    A1C  --   --   --   --   --   --   --   --   --   --   --   --   --   --   --   --   --   --   --   --   --   --   --  5.6  --   --    < >  --    MAIKOL  --   --   --   --   --   --   --   --   --  7.7*  --  8.1*  --  7.6*  --  7.5*  --   --   --  7.2*  --  7.2*   < > 8.3*   < >  --    < >  --    PHOS  --   --   --   --   --   --   --   --   --   --   --   --   --  3.7  --  3.9  --   --   --  4.2  --  4.7*   < >  --   --   --   --   --    MAG  --   --   --   --   --   --   --   --   --   --   --   --   --   --   --  1.8  --   --   --  1.7  --  1.9   < > 1.7   < >  --   --   --    LACT  --   --   --   --   --   --   --   --   --   --   --   --   --   --   --   --   --   --   --   --   --   --   --   --   --  1.3  --  0.9    < > = values in this interval not displayed.     Hepatic Studies    Recent Labs   Lab Test 06/26/23  0513 06/25/23  0615 06/24/23  0654 06/23/23  0840 06/22/23  1341 06/22/23  0711 06/20/23  0704 06/19/23  0915 06/16/23  0605 06/14/23  0651 05/31/23  0630 05/26/23  0753   BILITOTAL  --   --   --   --   --   --  0.2 0.2 0.2 0.2 0.2 0.3   ALKPHOS  --   --   --   --   --   --  93 94 114 110 74 92   ALBUMIN 2.2* 2.3* 2.4* 2.4* 2.4* 2.2* 2.3* 2.6* 2.8* 2.8* 3.2* 3.7   AST  --   --   --   --   --   --  14 19 16 16 14 10   ALT  --   --   --   --   --   --  14 12 10 8 6* 6*     Hematology Studies      Recent Labs   Lab  Test 06/26/23  0513 06/25/23  0615 06/24/23  0654 06/23/23  0840 06/22/23  1926 06/22/23  1341 06/22/23  0711 06/21/23  0800   WBC  --  13.9* 14.9* 14.0*  --  18.1* 17.8* 10.1   HGB 7.6* 6.8* 7.3* 7.5* 7.5* 6.7* 5.3* 7.3*   HCT  --  22.1* 23.0* 23.6*  --  21.4* 17.6* 24.1*   PLT  --  310 290 312  --  367 347 354     Arterial Blood Gas Testing    Recent Labs   Lab Test 06/15/23  1325 11/26/22  0945 11/22/22  1925 11/22/22  1644   PH 7.39  --   --   --    PCO2 44  --   --   --    PO2 83  --   --   --    HCO3 26  --   --   --    O2PER 2 32 40.0 47.0          Latest Ref Rng & Units 6/30/2023     7:43 AM 6/29/2023     7:20 AM 6/28/2023     6:58 AM 6/26/2023     5:13 AM 6/25/2023     6:15 AM   Transplant Immunosuppression Labs   Creat 0.67 - 1.17 mg/dL 2.09  1.98  1.59     1.59  1.42  1.65    Urea Nitrogen 8.0 - 23.0 mg/dL  39.2  39.8  42.8  46.0    WBC 4.0 - 11.0 10e3/uL     13.9           Imaging:   XR Chest Port 1 View  Result Date: 6/14/2023  IMPRESSION: Negative portable view. If further detail is needed comment consider upright PA and lateral examination when clinical condition permits. TAE MORRIS MD   SYSTEM ID:  [id number]    XR Abdomen 1 View  Result Date: 6/10/2023  IMPRESSION: Nonobstructive bowel gas pattern. Large stool burden. I have personally reviewed the examination and initial interpretation and I agree with the findings. JONNATHAN COLEMAN MD   SYSTEM ID:  [id number]

## 2023-06-30 NOTE — PROGRESS NOTES
Alert and oriented times four. Flat affect. Unhappy with care overall. Refused repositioning. Refused skin assessment. Uses bedside urinal. Waiting for him to void so a urine sample can be sent. Large BM 6/28.  and . Right chest PICC heparin locked. Right arm PIV. Antibiotic ran successfully.     Patient's most recent vital signs are:     Vital signs:  BP: 118/33  Temp: 98.6  HR: 71  RR: 16  SpO2: 96 %     Patient does not have new respiratory symptoms.  Patient does not have new sore throat.  Patient does not have a fever greater than 99.5.       ;

## 2023-06-30 NOTE — CONSULTS
"  PSYCHIATRIC CONSULTATION    Requesting Physician: Caden Frazier MD    Admission Home: 06/17/2023  Date of Service: 06/30/2023    The patient was seen, his chart reviewed, report to follow.  I saw the patient about a month ago. Psychiatrically nothing had changed in his condition since except for self-reported \"confusion\" and word finding difficulties. He is still in a lot of pain (hip pain and \"the whole body aches\").    DX: Adjustment Disorder with depressed mood    Plan/Discussion: Once again I offered to start him on a particular antidepressant, Cymbalta, which will help him with depression and help manage the pain. The initial dose would be 30 mg QAM.  He told me that he will consider it.  Health Psychology should also be involved for supportive psychotherapy.    Thanks,    Raymond Salinas MD  [phone] pager  "

## 2023-06-30 NOTE — PROGRESS NOTES
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  Postop course complicated by profound deconditioning.  He was admitted to TCU initially on 12/23/2022 for physical rehabilitation, however therapies no longer worked with patient after several months of an in bed therapy and his refusals to mobilize.  Underwent biopsy on 4/7/2023 without evidence of infection.  He was transferred back to [facility] for left total hip arthroplasty and reimplantation on 5/26/23 with Dr. Meyers.  Presented again to [facility] for persistent MRSA bacteremia on 6/17.     Brief Hospital Summary:   Pt was admitted to continue infectious workup with MRSA bacteremia.  Admission blood cultures were positive, ID and Ortho were consulted. KEILA was obtained on 6/19, which was negative for endocarditis, ortho took for washout/debridement of hip on 6/21. ID recommending 6 weeks abx (6/21-8/1), patient recovering from HALEY, but given history of HALEY's and need for long term vanc plus comorbids making declining kidney function in the future probable nephrology doesn't feel comfortable with patient getting a PICC -- Tunneled internal jugular line placed. Transitioned cefepime to cipro, continue vancomycin.     Plan for today:  - Psych consult for capacity given paranoia and non-linear discussions regarding goals of care  - Creatinine continues to rise, FeNA, UA, Urine albumin:cr ratio ordered. Nephrology consulted. Continue to hold lasix.   - Cipro converted back to cefepime given labile  creatinine and prolonged qtc, per ID  - held robaxin given complaints of mental fogginess, will continue tramadol for now, consider spacing from q6 to q8 if tolerated from a pain standpoint    MRSA bacteremia due to possible left hip septic arthritis vs other source Blood cultures: 6/14, 6/15 + for MRSA (4/4), and 6/16 (1/2 GPCs) and 6/17 (2/2) GPCs. - started vancomycin 6/15.   6/17 sinovial fluid cultures with staph and klebsiella. Purulent drainage noted at incision site at TCU, CT left hip concerning for infection (no mention of joint, mostly anterior in soft tissue) and couldn't exclude abscess/inf  - Continue vancomycin, added rocephin 6/21 and changed to cefepime 6/23; ID recommends 6 weeks IV vanc (last day 8/1/23) and can transition to oral cipro 750 mg BID at discharge, needs weekly vanc level, cbcd, cmp, crp while on IV abx, regular ECG monitoring of QTc on high dose cipro --> follow up outpatient with ID prior to discontinuing antibiotics (needs appt end of July)  - blood cultures negative 6/19 and 6/20 (last + was 6/17), trend CRP q48h (way down since initiation of treatment and washout)  - intraop cultures are growing Staph, Pseudomonas, and C acnes    Acute Kidney Injury - multifactorial, see below  HALEY recurrent 6/29, repeat 500 ml bolus, if no improvement obtain urine studies, consider nephrology consult.   - baseline creatinine 1.0 or 1.1  - etiology: suspect combo of sepsis/inflammation, hypovolemia, blood loss anemia following surgery, vancomycin?  - volume (hypovolemia/congestion): euvolemic but tough to assess based on habitus  - nephrotoxins: vancomycin  - diabetes: yes; last A1C < 6, though  - sepsis: resolved now  - anemia: yes, ongoing  - cardiac performance: normal based on KEILA from 6/19  - inflammation: from surgery and infection  - creatinine downtrending; neph ordered C3 and 4, pending still (6/23)    Bilateral LE edema due to volume overload from HALEY and iatrogenic from blood  transfusions  - started lasix 60 mg PO BID per nephrology, now holding due to HALEY    Hypoxemic Respiratory Failure due to immobility, surgery - resolved (sena)  - patient with fluctuating oxygen requirement but off O2 since 6/23  - continue to follow; encourage IS    Acute blood loss anemia following surgery, complicated by use of blood thinner  - hgb 7.6 this morning; got 3 units day after surgery did joint washout (6/22) for acute blood loss anemia, 1 unit 6/25  - watch for bleeding; hold apixaban if need be - had unprovoked dvt in 2018 and would be on eliquis for life probably, at really high risk of recurrent DVT given recent procedures and illness/immobility  - I added on some iron studies and B12 level today; interpret with caution given transfusions; probably wouldn't hurt to supplement iron and folic acid given rapid turnover and need for transfusions, ordered to start tomorrow      S/p left hip explantation of left hip antibiotic spacer and revision of left total hip arthroplasty (5/26/2023)  - ortho performed washout 6/21; drain to come out in 2 weeks  -Activity: 50% PWB LLE with posterior hip precautions [id number]  -AFO ordered for foot drop but patient refusing to use  -PT/OT  -Knee to be kept even in a foam leg elevator to relax sciatic nerve  -Pain: Scheduled robaxin 4 times a day, APAP PRN                 -oxycodone  to 5-7.5 mg discontinued                 -tramadol 25-50 mg every 6 hours PRN    Hx of unprovoked DVT in 2018   - on eliquis 5 mg bid     Altered mental status due to Delirium from illness/hospitalization  resolved  - delirium precuations      Type 2 diabetes mellitus  - Last hemoglobin A1c 5.6% on 6/16/2023.  On glipizide, metformin, Actos, and Victoza prior to hospitalizations and surgery.   - continue ISS    Constipation  -Continue Colace 100 mg twice daily  -Continue senna 1 tablet twice daily  -Continue MiraLAX daily     Hypomagnesemia  -monitor and replete      Severe malnutrition in  "the context of acute on chronic illness  Goal for patient is to consume % of meals TID. Goal is 0611-3253 kcals/daily. Protein needs- 100-126 grams/daily.   -RD consult and appreciate recs  -regular diet  -Weekly weights, daily I/O     Adjustment disorder with depressed mood  Prolonged grief disorder  Patient has been with depressed mood in setting of ongoing health issues, as evidenced by lack of motivation to work with therapies during both TCU admissions.  Past history of passive suicidal statements. Psychiatry saw patient during hospital admission on 5/31 due to lack of motivation with therapies. Patient declined interest in starting any psychiatric medications. Was willing to engage with health psychology.   -Outpatient health psychology consult  -Continue to monitor for willingness to start antidepressant therapy     Stable and Resolved Issues:  Hyperlipidemia-continue PTA Lipitor 40 mg daily  JAIR-CPAP when sleeping         Diet: Snacks/Supplements Adult: Other; Chocholate or Butter Pecan Glucerna with bkf trays daily; With Meals  Regular Diet Adult  Room Service    DVT Prophylaxis: DOAC  Tran Catheter: Not present  Lines: PRESENT      CVC Single Lumen Right Internal jugular Non - valved (open ended);Tunneled;Power injectable-Site Assessment: WDL      Cardiac Monitoring: None  Code Status: Full Code      Clinically Significant Risk Factors              # Hypoalbuminemia: Lowest albumin = 2.2 g/dL at 6/26/2023  5:13 AM, will monitor as appropriate    # Acute Kidney Injury, unspecified: based on a >150% or 0.3 mg/dL increase in last creatinine compared to past 90 day average, will monitor renal function         # Obesity: Estimated body mass index is 39.86 kg/m  as calculated from the following:    Height as of this encounter: 1.778 m (5' 10\").    Weight as of this encounter: 126 kg (277 lb 12.5 oz).   # Severe Malnutrition: based on nutrition assessment         Disposition Plan     Expected Discharge " Date: 07/01/2023        Discharge Comments: [facility]          Caden Frazier MD  Hospitalist Service, GOLD TEAM 21  [facility]  Securely message with [employer] (more info)  Text page via [employer] Paging/Directory   See signed in provider for up to date coverage information  ______________________________________________________________________    Interval History   No acute events overnight. Patient is feeling overwhelmed with his illness, more tired today.     Physical Exam   Vital Signs: Temp: 98.6  F (37  C) Temp src: Oral BP: (!) 118/33 Pulse: 71   Resp: 16 SpO2: 96 % O2 Device: None (Room air)    Weight: 277 lbs 12.47 oz    Constitutional: sitting in bed, anxious appearing   CV: regular rate and rhythm, no murmurs rubs or gallops  Resp: clear to auscultation bilaterally  Abd: soft, non-tender, non-distended, no hepatosplenomegaly or masses palpated  Neuro: alert and oriented x 3, speech clear      Medical Decision Making       50 MINUTES SPENT BY ME on the date of service doing chart review, history, exam, documentation & further activities per the note.      Data         Imaging results reviewed over the past 24 hrs:   No results found for this or any previous visit (from the past 24 hour(s)).

## 2023-06-30 NOTE — PROGRESS NOTES
"Orthopedic Surgery Progress Note: 06/30/2023    Subjective:   No acute events overnight. AVSS. Worked with therapies. Transitioned back to cefepime by ID. Got PICC yesterday. Dispo planning ongoing.     Objective:   /50 (BP Location: Right arm)   Pulse 71   Temp 98.5  F (36.9  C) (Oral)   Resp 14   Ht 1.778 m (5' 10\")   Wt 126 kg (277 lb 12.5 oz)   SpO2 96%   BMI 39.86 kg/m    I/O this shift:  In: -   Out: 410 [Urine:400; Drains:10]  General: NAD. Resting comfortably in bed.  Respiratory: Breathing comfortably on RA.  Drain Output:  Output by Drain (mL) 06/28/23 0700 - 06/28/23 1459 06/28/23 1500 - 06/28/23 2259 06/28/23 2300 - 06/29/23 0659 06/29/23 0700 - 06/29/23 1459 06/29/23 1500 - 06/29/23 2259 06/29/23 2300 - 06/30/23 0620   Closed/Suction Drain Anterior;Left Thigh Bulb 15 [place]  50  15  10   Musculoskeletal: Focused exam of the left lower extremity: Drain in place. Sanguinous drainage on dressings. Fires GSC, TA, FHL, and EHL minimally. Incredibly sensitive to touch on feet. Overall decreased sensation distally. Brisk capillary refill.     Laboratory Data:  Lab Results   Component Value Date    WBC 13.9 (H) 06/25/2023    HGB 7.6 (L) 06/26/2023     06/25/2023      Intraoperative cultures   6/21/2023: MRSA, C Acnes     Assessment & Plan:   Waldo Bustos is a 72 year old male status post left revision total hip arthroplasty on 5/26/2023 with Dr. Meyers now presenting with left hip prosthetic joint infection status post left hip irrigation and debridement on 6/21/2023 with Dr. Meyers.    CRP downtrending. C acnes, MRSA in cultures. Treating with vanc/cefepime, per ID recs. Appreciate assistance.    Updated activity orders but still on bedrest for PICC - defer to primary team and IR but would resume WBAT LLE and PHP when able.    Goals for Today:  - Pain control and mobilization - appreciate therapies recs  - OK for nursing to remove and replace absorptive dressing over drain site as " needed  - Discharge planning    Ortho Plan:  - Activity: Up with assist until independent. Posterior hip precautions x 3 months (no flexion beyond 90 degrees, no adduction beyond midline, and no internal rotation beyond midline).  - Weightbearing Status: WBAT LLE.  - Pain Management: Transition from IV to PO as tolerated.  - Antibiotics: Vancomycin and cipro, per ID.  - Diet: OK for a diet from an orthopedic perspective.  - DVT Prophylaxis: SCDs and PTA apixaban 5 mg BID.  - Imaging: None pending.  - Labs: Labs PRN.  - Bracing/Splinting: Abduction pillow while in bed.  - Dressings: Keep dressing C/D/I x 2 weeks.  - Drains: GUERRERO drain in place (remove in 2 weeks).  - Tran Catheter: None.   - Physical Therapy/Occupational Therapy: Evaluation and treatment.  - Cultures: Intraoperative cultures 6/21/2023.  - Follow-up: Clinic with Dr. Meyers's team TBD. Will plan to get XR at that time.    - Disposition: Pending progress with therapies, pain control on orals, and medical stability; anticipate discharge to TCU with timing TBD.    Orthopedic Surgery staff for this patient is Dr. Meyers.    ------------------------------------------------------------------------------------------    Hannah Starr MD, PGY-4  Orthopedics

## 2023-06-30 NOTE — PLAN OF CARE
"  VS:   /50 (BP Location: Right arm)   Pulse 71   Temp 98.5  F (36.9  C) (Oral)   Resp 14   Ht 1.778 m (5' 10\")   Wt 126 kg (277 lb 12.5 oz)   SpO2 96%   BMI 39.86 kg/m     O2: Stable on room air>90%. Denies SOB, chest pain   Output: Using urinal at bedside   Last BM: 6/28/23   Activity: Assist of 2-3 with turns and bed cares   Skin: Redness blanchable to sacrum/coccyx- barrier cream and mepilex in place  Left hip surgical incision  Swollen left foot   Pain: Chronic neck pain and left hip managed with Tramadol and    CMS/Neuro: AxO x 4. Numbess to Bilat lower extremities    Dressing: L hip- CDI  GUERRERO dressing- dried drainage    Diet: Reg diet. BG checks and Insulin     LDA: Right PIV saline locked and Right Jugular CVC  GUERRERO drain   Equipment: PT belongings, commode, IV  Pole and pump   Plan: Pending Discharge to TCU   Additional Info:                             "

## 2023-06-30 NOTE — PROGRESS NOTES
Nephrology attending    S: Pt reports anorexia but is otherwise well without N/V, F/C, CP/SOB, dysuria. 4pt ROS negative.    O:   136/50   118/33   96% on RA  Gen - sitting up in bed, nad  CV - distant, no rub  Resp - CTA anteriorly  Abd - BS+, NT/ND  Ext - 2+ edema    Labs  Cr 2.1 < 2 < 1.6 (6/28) < 1.4 (6/26) < 2.4 (6/22) < 1.1 (6/19)    Medications reviewed    A/Rec: 71yo M with persistent prosthetic joint infection of L hip admitted for MRSA bacteremia with recurrent HALEY.   HALEY - Cr improved to 1.4 on 6/26 but has since risen back to 2.1. Continued exposure to vanc could be contributing. Hemodynamically stable so unlikely ATN or pre-renal. No rash to suggest AIN but cipro started 6/27 so a possibility.    - please check CBC with diff and UA and repeat ultrasound    Volume overload - continue furosemide 60mg PO BID   - check weights every other day.    Finn Woods MD  [phone]

## 2023-07-01 NOTE — PROGRESS NOTES
"Nephrology Progress note;    S: Reports of continued pain in his lt ankle and heel. Does not have an appetite today.     /50 (BP Location: Left arm)   Pulse 74   Temp (!) 96.7  F (35.9  C) (Oral)   Resp 16   Ht 1.778 m (5' 10\")   Wt 126 kg (277 lb 12.5 oz)   SpO2 97%   BMI 39.86 kg/m      Gen - sitting up in bed, nad  CV - distant, no rub  Resp - CTA anteriorly  Abd - BS+, NT/ND  Ext - 2+ edema    Labs: Reviewed     Medications reviewed    A/Rec: 71yo M with persistent prosthetic joint infection of L hip admitted for MRSA bacteremia with recurrent HALEY.   HALEY - Cr improved to 1.4 on 6/26 but has since risen back to 2.1. Continued exposure to vanc could be contributing.. No rash to suggest AIN but cipro started 6/27 so a possibility.     --creatinine slightly higher today, does not have anephric rise and maintains a good urine output  --does not have peripheral eosinophilia, also Ciprofloxacin now stopped. Vanco levels down to 21.   --does have granular casts on his UA, could be persistent ATI. Renal US read pending.    Volume overload - continue furosemide 60mg PO BID   - check weights every other day.    Dawna Watts MD  [phone]      "

## 2023-07-01 NOTE — PROGRESS NOTES
VS: Temp: (!) 96.3  F (35.7  C) Temp src: Oral BP: 123/47 Pulse: 80   Resp: 12 SpO2: 98 % O2 Device: None (Room air)       O2: Patient on room air, denies chest pain, no cough noted   Output: Voids spontaneously in urinal, minimal amounts each time   Last BM: 6/28   Activity: Bedfast, extensive assist of 2, lift device, obese. Declined body repositioning   Skin: intact   Pain: denies   CMS: A&O/4 , able to make needs known   Dressing: Left hip incision, CDI   Diet: REG   LDA: PORT to right chest area, flushed, patent   Equipment: IV pole/pump, personal belongings   Plan: ABX For 6 weeks then transfer to a TCU   Additional Info:

## 2023-07-01 NOTE — CONSULTS
Consult Date: 2023    PSYCHIATRIC CONSULTATION    REQUESTED BY:  Dr. Caden Frazier    The patient is a 72-year-old, , white male with no previous psychiatric history but with a long history of left hip problems since at least , with 3 separate hip replacements and 1 revision, who had spent about 4 months in rehab facility and was admitted here again because of left hip prosthesis joint infection and related bacteremia.  He has been somewhat confused and depressed, and psychiatric consultation was ordered to assess his current status and advise on further management.    HISTORY OF PRESENT ILLNESS:  I had reviewed the patient's chart and interviewed the patient in his room.  He engaged in interview without any difficulties and provided coherent and seemingly reliable history.  He told me that he has been confused and has been having some word-finding difficulties; however, during my interview, he has done quite well and showed neither confusion nor problems choosing the words.  He did appear to be understandably depressed because of his long struggle with his hip and ongoing hip pain and entire body aches.    He did not mention anything about his son this time, but during my previous evaluation of him in 2023, he admitted to having been depressed after losing his son in , who had apparently  of a drug overdose.    The patient had never been evaluated by a psychiatrist on an outpatient basis and never had any psychotherapy.    CHEMICAL USE HISTORY:  The patient adamantly denied any history of alcohol abuse, illicit drug abuse or prescription medication abuse.    FAMILY HISTORY:  The patient was adopted at a young age and does not know much about his biological parents.  His son had some persisting chemical dependency problems and  of a drug overdose in .  The patient believes that this was accidental.    PAST MEDICAL HISTORY:  Remarkable for above-mentioned left hip problems with 4  consecutive surgeries, diabetes mellitus type 2, hyperlipidemia, obstructive sleep apnea, hypoxia and malnutrition.  He also presented with profound deconditioning.    ALLERGIES:  THE PATIENT IS ALLERGIC TO SULFA DRUGS AND TIZANIDINE, WHICH CAUSED FREQUENT URINATION, DROWSINESS AND DRY MOUTH.    BRIEF SOCIAL HISTORY:  The patient was born and raised in [place].  He was adopted at a young age, and his childhood was uneventful.  He denied any incidence of abuse of any kind while he was growing up.  He graduated from high school and had 1 year of college but  never graduated.  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  He got  in 1979, and the marriage had produced 1 son who had passed away in 2001.  The marriage had ended in divorce in 1992.  He has been with his current girlfriend for over 30 years, and they had ran a foster care together for 13 years.  He has been living in a 4-level house in [place], [place] and, because of his hip problems, it was difficult to stay there.    MENTAL STATUS EXAMINATION:  Revealed a normally-built and normally-developed, obese, generally pleasant, friendly and cooperative with the interview 72-year-old white male appearing his stated age.  He was alert and oriented x4 except for not providing the exact date.  He told me that it was 06/23/2023.  His speech was coherent, logical and goal directed.  He showed no objective signs of autonomous depression but appeared to be depressed and frustrated with his continuous medical problems.  He adamantly denied suicidal ideation or intent.  He denied hallucinations, and no prominent delusions could be noted or elicited.  I do not see any significant cognitive impairment.  He had some insight into his problems, and his judgment does not seem to be significantly impaired.    DIAGNOSTIC IMPRESSION:  Adjustment disorder with depressed mood.    PLAN/DISCUSSION:  Once again, I  offered to start him on a particular antidepressant, Cymbalta, which will help him with his depression and help manage his pain. As opposed to his previous adamant refusal to take anything, he told me that he will consider it.  I would also involve health psychology to provide supportive psychotherapy.    I thank you very much for letting me participate in the care of this patient.    Raymond Salinas MD        D: 2023   T: 2023   MT: iliana    Name:     SANDRA BRANCHParker  MRN:      [id number]        Account:      [id number]   :      1950           Consult Date: 2023     Document: [id number]

## 2023-07-01 NOTE — PROGRESS NOTES
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  Postop course complicated by profound deconditioning.  He was admitted to TCU initially on 12/23/2022 for physical rehabilitation, however therapies no longer worked with patient after several months of an in bed therapy and his refusals to mobilize.  Underwent biopsy on 4/7/2023 without evidence of infection.  He was transferred back to [facility] for left total hip arthroplasty and reimplantation on 5/26/23 with Dr. Meyers.  Presented again to [facility] for persistent MRSA bacteremia on 6/17.     Brief Hospital Summary:   Pt was admitted to continue infectious workup with MRSA bacteremia.  Admission blood cultures were positive, ID and Ortho were consulted. KEILA was obtained on 6/19, which was negative for endocarditis, ortho took for washout/debridement of hip on 6/21. ID recommending 6 weeks abx (6/21-8/1), patient recovering from HALEY, but given history of HALEY's and need for long term vanc plus comorbids making declining kidney function in the future probable nephrology doesn't feel comfortable with patient getting a PICC -- Tunneled internal jugular line placed. Transitioned cefepime to cipro back to cefepime given HALEY and prolonged qtc, continue vancomycin.     Plan for today:  - Psych consulted, patient has capacity, no evidence of cognitive dysfunciton. Discussed starting cymbalta, patient said he would think about it. Will check in again this week.   - Nephrology consulted for HALEY. Obtaining LIBAN  -  held robaxin given complaints of mental fogginess, will continue tramadol for now. Continue to monitor pain and mental status.     MRSA bacteremia due to possible left hip septic arthritis vs other source Blood cultures: 6/14, 6/15 + for MRSA (4/4), and 6/16 (1/2 GPCs) and 6/17 (2/2) GPCs. - started vancomycin 6/15.   6/17 sinovial fluid cultures with staph and klebsiella. Purulent drainage noted at incision site at TCU, CT left hip concerning for infection (no mention of joint, mostly anterior in soft tissue) and couldn't exclude abscess/inf  - Continue vancomycin, added rocephin 6/21 and changed to cefepime 6/23; ID recommends 6 weeks IV vanc (last day 8/1/23) and can transition to oral cipro 750 mg BID at discharge, needs weekly vanc level, cbcd, cmp, crp while on IV abx, regular ECG monitoring of QTc on high dose cipro --> follow up outpatient with ID prior to discontinuing antibiotics (needs appt end of July)  - blood cultures negative 6/19 and 6/20 (last + was 6/17), trend CRP q48h (way down since initiation of treatment and washout)  - intraop cultures are growing Staph, Pseudomonas, and C acnes    Acute Kidney Injury - multifactorial, see below  HALEY recurrent 6/29, repeat 500 ml bolus with no improvement. No evidence of hypovolemia. Nephrology consulted, appreciate recs.   - Renal ultrasound ordered  - baseline creatinine 1.0 or 1.1  - etiology: suspect combo of sepsis/inflammation, hypovolemia, blood loss anemia following surgery, vancomycin?  - volume (hypovolemia/congestion): euvolemic but tough to assess based on habitus  - nephrotoxins: vancomycin  - diabetes: yes; last A1C < 6, though  - sepsis: resolved now  - anemia: yes, ongoing  - cardiac performance: normal based on KEILA from 6/19  - inflammation: from surgery and infection  - creatinine downtrending; neph ordered C3 and 4, normal    Bilateral LE edema due to volume overload from HALEY and iatrogenic from blood transfusions  - started lasix 60 mg PO  BID per nephrology, now holding due to HALEY    Hypoxemic Respiratory Failure due to immobility, surgery - resolved (sena)  - patient with fluctuating oxygen requirement but off O2 since 6/23  - continue to follow; encourage IS    Acute blood loss anemia following surgery, complicated by use of blood thinner  - hgb 7.6 this morning; got 3 units day after surgery did joint washout (6/22) for acute blood loss anemia, 1 unit 6/25  - watch for bleeding; hold apixaban if need be - had unprovoked dvt in 2018 and would be on eliquis for life probably, at really high risk of recurrent DVT given recent procedures and illness/immobility  - I added on some iron studies and B12 level today; interpret with caution given transfusions; probably wouldn't hurt to supplement iron and folic acid given rapid turnover and need for transfusions, ordered to start tomorrow      S/p left hip explantation of left hip antibiotic spacer and revision of left total hip arthroplasty (5/26/2023)  - ortho performed washout 6/21; drain to come out in 2 weeks  -Activity: 50% PWB LLE with posterior hip precautions [id number]  -AFO ordered for foot drop but patient refusing to use  -PT/OT  -Knee to be kept even in a foam leg elevator to relax sciatic nerve  -Pain: Scheduled robaxin 4 times a day, APAP PRN                 -oxycodone  to 5-7.5 mg discontinued                 -tramadol 25-50 mg every 6 hours PRN    Hx of unprovoked DVT in 2018   - on eliquis 5 mg bid     Altered mental status due to Delirium from illness/hospitalization  resolved  - delirium precuations      Type 2 diabetes mellitus  - Last hemoglobin A1c 5.6% on 6/16/2023.  On glipizide, metformin, Actos, and Victoza prior to hospitalizations and surgery.   - continue ISS    Constipation  -Continue Colace 100 mg twice daily  -Continue senna 1 tablet twice daily  -Continue MiraLAX daily     Hypomagnesemia  -monitor and replete      Severe malnutrition in the context of acute on chronic  "illness  Goal for patient is to consume % of meals TID. Goal is 3847-3300 kcals/daily. Protein needs- 100-126 grams/daily.   -RD consult and appreciate recs  -regular diet  -Weekly weights, daily I/O     Adjustment disorder with depressed mood  Prolonged grief disorder  Patient has been with depressed mood in setting of ongoing health issues, as evidenced by lack of motivation to work with therapies during both TCU admissions.  Past history of passive suicidal statements. Psychiatry saw patient during hospital admission on 5/31 due to lack of motivation with therapies. Patient declined interest in starting any psychiatric medications. Was willing to engage with health psychology.   -Outpatient health psychology consult  -Continue to monitor for willingness to start antidepressant therapy     Stable and Resolved Issues:  Hyperlipidemia-continue PTA Lipitor 40 mg daily  JAIR-CPAP when sleeping         Diet: Snacks/Supplements Adult: Other; Chocholate or Butter Pecan Glucerna with bkf trays daily; With Meals  Regular Diet Adult  Room Service    DVT Prophylaxis: DOAC  Tran Catheter: Not present  Lines: PRESENT      CVC Single Lumen Right Internal jugular Non - valved (open ended);Tunneled;Power injectable-Site Assessment: WDL      Cardiac Monitoring: None  Code Status: Full Code      Clinically Significant Risk Factors              # Hypoalbuminemia: Lowest albumin = 2.2 g/dL at 6/26/2023  5:13 AM, will monitor as appropriate    # Acute Kidney Injury, unspecified: based on a >150% or 0.3 mg/dL increase in last creatinine compared to past 90 day average, will monitor renal function         # Obesity: Estimated body mass index is 39.86 kg/m  as calculated from the following:    Height as of this encounter: 1.778 m (5' 10\").    Weight as of this encounter: 126 kg (277 lb 12.5 oz).   # Severe Malnutrition: based on nutrition assessment         Disposition Plan     Expected Discharge Date: 07/02/2023        Discharge " Comments:  TCU          Caden Frazier MD  Hospitalist Service, GOLD TEAM 21  [facility]  Securely message with [employer] (more info)  Text page via [employer] Paging/Directory   See signed in provider for up to date coverage information  ______________________________________________________________________    Interval History   No acute events overnight. Feels better today, less tired.     Physical Exam   Vital Signs: Temp: (!) 96.7  F (35.9  C) Temp src: Oral BP: 135/50 Pulse: 74   Resp: 16 SpO2: 97 % O2 Device: None (Room air)    Weight: 277 lbs 12.47 oz    Constitutional: sitting in bed, anxious appearing   CV: regular rate and rhythm, no murmurs rubs or gallops  Resp: clear to auscultation bilaterally  Abd: soft, non-tender, non-distended, no hepatosplenomegaly or masses palpated  Neuro: alert and oriented x 3, speech clear      Medical Decision Making       50 MINUTES SPENT BY ME on the date of service doing chart review, history, exam, documentation & further activities per the note.      Data     I have personally reviewed the following data over the past 24 hrs:    N/A  \   N/A   / N/A     136 N/A N/A /  116 (H)   4.5 N/A 2.11 (H) \       Imaging results reviewed over the past 24 hrs:   No results found for this or any previous visit (from the past 24 hour(s)).

## 2023-07-01 NOTE — PLAN OF CARE
"Goal Outcome Evaluation:  VS: /47 (BP Location: Left arm)   Pulse 80   Temp (!) 96.3  F (35.7  C) (Oral)   Resp 12   Ht 1.778 m (5' 10\")   Wt 126 kg (277 lb 12.5 oz)   SpO2 98%   BMI 39.86 kg/m       O2: SpO2 > 92 and stable on RA. LS clear and equal bilaterally. Denies chest pain and SOB.    Output: Voids spontaneously without difficulty to bathroom / using beside urinal / BSC.   Last BM: LBM 6/28, denies abdominal discomfort. BS active / passing flatus.    Activity: Not OOB. Pt refused to reposition.    Skin: WDL except, L hip incision.    Pain: Pain managed with prn tramadol and tylenol.    CMS: Intact, AOx4. Denies numbness and tingling.   Dressing: L hip incisional dressing has dried drainage; Reinforced with ABD.    Diet: Regular diet. Denies nausea/vomiting.    LDA: PICC hep locked on right chest between abx.    Equipment: IV pole, personal belongings,    Plan: TBD. Contact precautions maintained. Continue with plan of care. Call light within reach, pt able to make needs known.    Additional Info:                              "

## 2023-07-01 NOTE — PROGRESS NOTES
Pt is alert&ox4, call light within reach. Pt able to make needs known. Pt denies chest pain, SOB, and headache.     Pt on RA.   LBM 7/1  Pt requires assist of 2 using lift device, pt refused body positioning this morning. Pt was educated on the importance of changing  position, pt still refused. Pt however did get repositioned when changing and transferring to another bed for ultrasound.     Hip incision: Tegaderm and gauze was coming, reinforced dressing with tape. Notified ortho, wound order was placed. Passed to the next RN for dress change.     Perry minimal output.      Blanchable redness around buttocks, skin was cleansed, and barrier cream applied.     Pt pain managed with tylenol and tramadol.     Mag low, provider notified.     P:Continue with plan of care

## 2023-07-01 NOTE — PROGRESS NOTES
"Orthopedic Surgery Progress Note: 07/01/2023    Subjective:   No acute events overnight. AVSS. Currently on Vanc and Cefe.     Objective:   /50 (BP Location: Left arm)   Pulse 74   Temp (!) 96.7  F (35.9  C) (Oral)   Resp 16   Ht 1.778 m (5' 10\")   Wt 126 kg (277 lb 12.5 oz)   SpO2 97%   BMI 39.86 kg/m    No intake/output data recorded.  General: NAD. Resting comfortably in bed.  Respiratory: Breathing comfortably on RA.  Drain Output:  Output by Drain (mL) 06/29/23 0700 - 06/29/23 1459 06/29/23 1500 - 06/29/23 2259 06/29/23 2300 - 06/30/23 0659 06/30/23 0700 - 06/30/23 1459 06/30/23 1500 - 06/30/23 2259 06/30/23 2300 - 07/01/23 0659 07/01/23 0700 - 07/01/23 0949   Closed/Suction Drain Anterior;Left Thigh Bulb 15 French 15  10 5  30    Musculoskeletal: Focused exam of the left lower extremity: Drain in place. Sanguinous drainage on dressings. Fires GSC, TA, FHL, and EHL minimally. Incredibly sensitive to touch on feet. Overall decreased sensation distally. Brisk capillary refill.     Laboratory Data:  Lab Results   Component Value Date    WBC 13.9 (H) 06/25/2023    HGB 7.6 (L) 06/26/2023     06/25/2023      Intraoperative cultures   6/21/2023: MRSA, C Acnes, Pseudomonas    Assessment & Plan:   Waldo Bustos is a 72 year old male status post left revision total hip arthroplasty on 5/26/2023 with Dr. Meyers now presenting with left hip prosthetic joint infection status post left hip irrigation and debridement on 6/21/2023 with Dr. Meyers.    CRP downtrending. C acnes, MRSA in cultures. Treating with vanc/cefepime, per ID recs. Appreciate assistance.    Updated activity orders but still on bedrest for PICC - defer to primary team and IR but would resume WBAT LLE and PHP when able.    Goals for Today:  - Pain control and mobilization - appreciate therapies recs  - OK for nursing to remove and replace absorptive dressing over drain site as needed  - Discharge planning    Ortho Plan:  - Activity: Up " with assist until independent. Posterior hip precautions x 3 months (no flexion beyond 90 degrees, no adduction beyond midline, and no internal rotation beyond midline).  - Weightbearing Status: WBAT LLE.  - Pain Management: Transition from IV to PO as tolerated.  - Antibiotics: Vancomycin and cipro, per ID.  - Diet: OK for a diet from an orthopedic perspective.  - DVT Prophylaxis: SCDs and PTA apixaban 5 mg BID.  - Imaging: None pending.  - Labs: Labs PRN.  - Bracing/Splinting: Abduction pillow while in bed.  - Dressings: Keep dressing C/D/I x 2 weeks.  - Drains: UGERRERO drain in place (remove in 2 weeks).  - Tran Catheter: None.   - Physical Therapy/Occupational Therapy: Evaluation and treatment.  - Cultures: Intraoperative cultures 6/21/2023.  - Follow-up: Clinic with Dr. Meyers's team TBD. Will plan to get XR at that time.    - Disposition: Pending progress with therapies, pain control on orals, and medical stability; anticipate discharge to TCU with timing TBD.    Angelito Garcia MD  Adult Reconstructive Surgery Fellow  Department of Orthopaedic Surgery, [facility]

## 2023-07-02 NOTE — PLAN OF CARE
"  VS: BP (!) 103/35 (BP Location: Left arm)   Pulse 77   Temp 97.8  F (36.6  C) (Oral)   Resp 16   Ht 1.778 m (5' 10\")   Wt 126 kg (277 lb 12.5 oz)   SpO2 96%   BMI 39.86 kg/m    Pt denies chest pain.    O2: >90% on RA.    Output: Oliguria to urinal overnight.    Last BM: 7/1/23   Activity: Ax2-3 for cares in bed, pt not OOB overnight.    Up for meals? N/A   Skin: Redness blanchable to bilateral shins  Buttocks/coccyx abrasions  L heel wound - DEEP due to mepilex  L hip incision    Pain: Managed with PRN Tramadol   CMS: Intact to pt baseline. Pt refused assessment of LLE. A&O x4   Dressing: L hip and GUERRERO dressing - CDI   Diet: Regular   LDA: R chest CVC - SL   Equipment: IV pole/pump, pt belongings   Plan: TBD   Additional Info: Pt reported to writer that [employer] cell phone has been missing since IR visit on 6/27. RN reported missing cell phone to charge RN and told next shift about it as well.        "

## 2023-07-02 NOTE — PROGRESS NOTES
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  Postop course complicated by profound deconditioning.  He was admitted to TCU initially on 12/23/2022 for physical rehabilitation, however therapies no longer worked with patient after several months of an in bed therapy and his refusals to mobilize.  Underwent biopsy on 4/7/2023 without evidence of infection.  He was transferred back to [facility] for left total hip arthroplasty and reimplantation on 5/26/23 with Dr. Meyers.  Presented again to [facility] for persistent MRSA bacteremia on 6/17.     Brief Hospital Summary:   Pt was admitted to continue infectious workup with MRSA bacteremia.  Admission blood cultures were positive, ID and Ortho were consulted. KEILA was obtained on 6/19, which was negative for endocarditis, ortho took for washout/debridement of hip on 6/21. ID recommending 6 weeks abx (6/21-8/1), patient recovering from HALEY, but given history of HALEY's and need for long term vanc plus comorbids making declining kidney function in the future probable nephrology doesn't feel comfortable with patient getting a PICC -- Tunneled internal jugular line placed. Transitioned cefepime to cipro back to cefepime given HALEY and prolonged qtc, continue vancomycin.     Plan for today:  - Creatinine stable (2.18->2.2). Resumed IV lasix given fluid overload per nephrology recs. Renal US without signs of obstruction.  - pain stable off of robaxin, still reports intermittent fogginess but felt better during our  encounter    MRSA bacteremia due to possible left hip septic arthritis vs other source Blood cultures: 6/14, 6/15 + for MRSA (4/4), and 6/16 (1/2 GPCs) and 6/17 (2/2) GPCs. - started vancomycin 6/15.   6/17 sinovial fluid cultures with staph and klebsiella. Purulent drainage noted at incision site at TCU, CT left hip concerning for infection (no mention of joint, mostly anterior in soft tissue) and couldn't exclude abscess/inf  - Continue vancomycin, added rocephin 6/21 and changed to cefepime 6/23; ID recommends 6 weeks IV vanc (last day 8/1/23) and can transition to oral cipro 750 mg BID at discharge, needs weekly vanc level, cbcd, cmp, crp while on IV abx, regular ECG monitoring of QTc on high dose cipro --> follow up outpatient with ID prior to discontinuing antibiotics (needs appt end of July)  - blood cultures negative 6/19 and 6/20 (last + was 6/17), trend CRP q48h (way down since initiation of treatment and washout)  - intraop cultures are growing Staph, Pseudomonas, and C acnes    Acute Kidney Injury - multifactorial, see below  HALEY recurrent 6/29, repeat 500 ml bolus with no improvement. No evidence of hypovolemia. Nephrology consulted, appreciate recs.   - Renal ultrasound ordered  - baseline creatinine 1.0 or 1.1  - etiology: suspect combo of sepsis/inflammation, hypovolemia, blood loss anemia following surgery, vancomycin?  - volume (hypovolemia/congestion): euvolemic but tough to assess based on habitus  - nephrotoxins: vancomycin  - diabetes: yes; last A1C < 6, though  - sepsis: resolved now  - anemia: yes, ongoing  - cardiac performance: normal based on KEILA from 6/19  - inflammation: from surgery and infection  - creatinine downtrending; neph ordered C3 and 4, normal    Bilateral LE edema due to volume overload from HALEY and iatrogenic from blood transfusions  - started lasix 60 mg PO BID per nephrology, now holding due to HALEY    Hypoxemic Respiratory Failure due to immobility, surgery - resolved  (sena)  - patient with fluctuating oxygen requirement but off O2 since 6/23  - continue to follow; encourage IS    Acute blood loss anemia following surgery, complicated by use of blood thinner  - hgb 7.6 this morning; got 3 units day after surgery did joint washout (6/22) for acute blood loss anemia, 1 unit 6/25  - watch for bleeding; hold apixaban if need be - had unprovoked dvt in 2018 and would be on eliquis for life probably, at really high risk of recurrent DVT given recent procedures and illness/immobility  - I added on some iron studies and B12 level today; interpret with caution given transfusions; probably wouldn't hurt to supplement iron and folic acid given rapid turnover and need for transfusions, ordered to start tomorrow      S/p left hip explantation of left hip antibiotic spacer and revision of left total hip arthroplasty (5/26/2023)  - ortho performed washout 6/21; drain to come out in 2 weeks  -Activity: 50% PWB LLE with posterior hip precautions [id number]  -AFO ordered for foot drop but patient refusing to use  -PT/OT  -Knee to be kept even in a foam leg elevator to relax sciatic nerve  -Pain: Scheduled robaxin 4 times a day, APAP PRN                 -oxycodone  to 5-7.5 mg discontinued                 -tramadol 25-50 mg every 6 hours PRN    Hx of unprovoked DVT in 2018   - on eliquis 5 mg bid     Altered mental status due to Delirium from illness/hospitalization  resolved  - delirium precuations      Type 2 diabetes mellitus  - Last hemoglobin A1c 5.6% on 6/16/2023.  On glipizide, metformin, Actos, and Victoza prior to hospitalizations and surgery.   - continue ISS    Constipation  -Continue Colace 100 mg twice daily  -Continue senna 1 tablet twice daily  -Continue MiraLAX daily     Hypomagnesemia  -monitor and replete      Severe malnutrition in the context of acute on chronic illness  Goal for patient is to consume % of meals TID. Goal is 3314-9214 kcals/daily. Protein needs- 100-126  "grams/daily.   -RD consult and appreciate recs  -regular diet  -Weekly weights, daily I/O     Adjustment disorder with depressed mood  Prolonged grief disorder  Patient has been with depressed mood in setting of ongoing health issues, as evidenced by lack of motivation to work with therapies during both TCU admissions.  Past history of passive suicidal statements. Psychiatry saw patient during hospital admission on 5/31 due to lack of motivation with therapies. Patient declined interest in starting any psychiatric medications. Was willing to engage with health psychology.   -Outpatient health psychology consult  -Continue to monitor for willingness to start antidepressant therapy     Stable and Resolved Issues:  Hyperlipidemia-continue PTA Lipitor 40 mg daily  JAIR-CPAP when sleeping         Diet: Snacks/Supplements Adult: Other; Chocholate or Butter Pecan Glucerna with bkf trays daily; With Meals  Regular Diet Adult  Room Service    DVT Prophylaxis: DOAC  Tran Catheter: Not present  Lines: PRESENT      CVC Single Lumen Right Internal jugular Non - valved (open ended);Tunneled;Power injectable-Site Assessment: WDL      Cardiac Monitoring: None  Code Status: Full Code      Clinically Significant Risk Factors            # Hypomagnesemia: Lowest Mg = 1.4 mg/dL in last 2 days, will replace as needed   # Hypoalbuminemia: Lowest albumin = 2.2 g/dL at 6/26/2023  5:13 AM, will monitor as appropriate    # Acute Kidney Injury, unspecified: based on a >150% or 0.3 mg/dL increase in last creatinine compared to past 90 day average, will monitor renal function         # Obesity: Estimated body mass index is 39.86 kg/m  as calculated from the following:    Height as of this encounter: 1.778 m (5' 10\").    Weight as of this encounter: 126 kg (277 lb 12.5 oz).   # Severe Malnutrition: based on nutrition assessment         Disposition Plan     Expected Discharge Date: 07/02/2023        Discharge Comments: [facility]          Caden " Gerardo Frazier MD  Hospitalist Service, GOLD TEAM 21  [facility]  Securely message with [employer] (more info)  Text page via [employer] Paging/Directory   See signed in provider for up to date coverage information  ______________________________________________________________________    Interval History   No acute events overnight. Fogginess better this afternoon, still present this morning. Pain remains the same despite stopping robaxin.     Physical Exam   Vital Signs: Temp: 97.8  F (36.6  C) Temp src: Oral BP: (!) 103/35 Pulse: 77   Resp: 16 SpO2: 96 % O2 Device: None (Room air)    Weight: 277 lbs 12.47 oz    Constitutional: sitting in bed, anxious appearing   CV: regular rate and rhythm, no murmurs rubs or gallops  Resp: clear to auscultation bilaterally  Abd: soft, non-tender, non-distended, no hepatosplenomegaly or masses palpated  Neuro: alert and oriented x 3, speech clear      Medical Decision Making       50 MINUTES SPENT BY ME on the date of service doing chart review, history, exam, documentation & further activities per the note.      Data     I have personally reviewed the following data over the past 24 hrs:    11.7 (H)  \   7.6 (L)   / 362     138 106 38.5 (H) /  113 (H)   4.4 23 2.18 (H) \       Imaging results reviewed over the past 24 hrs:   Recent Results (from the past 24 hour(s))   US Renal Complete Non-Vascular    Narrative    EXAMINATION: US RENAL COMPLETE NON-VASCULAR, 7/1/2023 3:16 PM     COMPARISON: Ultrasound 6/23/2023, CT pelvis 10/25/2022    HISTORY: HALEY    TECHNIQUE: The kidneys and bladder were scanned in the standard  fashion with specialized ultrasound transducer(s) using both gray  scale and limited color/spectral Doppler techniques.    FINDINGS:    Right kidney: Measures 11.0 cm in length. Parenchyma is of normal  thickness and echogenicity. No focal mass. No hydronephrosis.    Left kidney: Measures 10.8 cm in length. Parenchyma is of  normal  thickness and echogenicity. No focal mass. No hydronephrosis.     Bladder: Unchanged bladder wall thickening measuring up to 6 mm,  likely in part due to the underdistention of the bladder.      Impression    IMPRESSION:  1.  Normal kidneys.   2.  Unchanged bladder wall thickening, likely in part due to  underdistention of the bladder. Findings are nonspecific, however, may  represent cystitis given recent history of urinary tract infection.    I have personally reviewed the examination and initial interpretation  and I agree with the findings.    MORRIS PEREZ MD         SYSTEM ID:  [id number]

## 2023-07-02 NOTE — PROGRESS NOTES
"Orthopedic Surgery Progress Note: 07/02/2023    Subjective:   No acute events overnight. AVSS. Currently on Vanc and Cefe. Feeling fatigued today. Awaiting TCU    Objective:   BP (!) 103/35 (BP Location: Left arm)   Pulse 77   Temp 97.8  F (36.6  C) (Oral)   Resp 16   Ht 1.778 m (5' 10\")   Wt 126 kg (277 lb 12.5 oz)   SpO2 96%   BMI 39.86 kg/m    No intake/output data recorded.  General: NAD. Resting comfortably in bed.  Respiratory: Breathing comfortably on RA.  Drain Output:  Output by Drain (mL) 06/30/23 0700 - 06/30/23 1459 06/30/23 1500 - 06/30/23 2259 06/30/23 2300 - 07/01/23 0659 07/01/23 0700 - 07/01/23 1459 07/01/23 1500 - 07/01/23 2259 07/01/23 2300 - 07/02/23 0659 07/02/23 0700 - 07/02/23 1109   Closed/Suction Drain Anterior;Left Thigh Bulb 15 French 5  30  5 30    Musculoskeletal: Focused exam of the left lower extremity: Drain in place. Sanguinous drainage on dressings. Fires GSC, TA, FHL, and EHL minimally. Incredibly sensitive to touch on feet. Overall decreased sensation distally. Brisk capillary refill.     Laboratory Data:  Lab Results   Component Value Date    WBC 11.7 (H) 07/01/2023    HGB 7.6 (L) 07/01/2023     07/01/2023      Intraoperative cultures   6/21/2023: MRSA, C Acnes, Pseudomonas    Assessment & Plan:   Waldo Bustos is a 72 year old male status post left revision total hip arthroplasty on 5/26/2023 with Dr. Meyers now presenting with left hip prosthetic joint infection status post left hip irrigation and debridement on 6/21/2023 with Dr. Meyers.    CRP downtrending. C acnes, MRSA in cultures. Treating with vanc/cefepime, per ID recs. Appreciate assistance.    Updated activity orders but still on bedrest for PICC - defer to primary team and IR but would resume WBAT LLE and PHP when able.    Goals for Today:  - Pain control and mobilization - appreciate therapies recs  - OK for nursing to remove and replace absorptive dressing over drain site as needed  - Discharge " planning    Ortho Plan:  - Activity: Up with assist until independent. Posterior hip precautions x 3 months (no flexion beyond 90 degrees, no adduction beyond midline, and no internal rotation beyond midline).  - Weightbearing Status: WBAT LLE.  - Pain Management: Transition from IV to PO as tolerated.  - Antibiotics: Vancomycin and cipro, per ID.  - Diet: OK for a diet from an orthopedic perspective.  - DVT Prophylaxis: SCDs and PTA apixaban 5 mg BID.  - Imaging: None pending.  - Labs: Labs PRN.  - Bracing/Splinting: Abduction pillow while in bed.  - Dressings: Keep dressing C/D/I x 2 weeks.  - Drains: GUERRERO drain in place (remove in 2 weeks).  - Tran Catheter: None.   - Physical Therapy/Occupational Therapy: Evaluation and treatment.  - Cultures: Intraoperative cultures 6/21/2023.  - Follow-up: Clinic with Dr. Meyers's team TBD. Will plan to get XR at that time.    - Disposition: ISABEL Garcia MD  Adult Reconstructive Surgery Fellow  Department of Orthopaedic Surgery, [facility]

## 2023-07-02 NOTE — PROGRESS NOTES
"Prolonged Parenteral/Oral Antibiotic Recommendations and ID Follow up  This template provides final ID recommendations as of this date. If there are clinical changes or questions please call the ID team.     Infectious Diseases Indication: L hip PJI, with retained hardware after 2-stage revision    Antibiotic Information  Name of Antibiotic Dose of Antibiotic1 Pharmacy to assist with dosing Y/N Anticipated duration Effective start date2 End date   Vancomycin AUC goal 400-600 Y 6 weeks 6/21/23 8/1/23   Cefepime 2g Q8H N 6 weeks 6/21/23 8/1/23           1.Dose of antibiotic will need to be renally adjusted if creatinine clearance changes  2.Effective start date is the date of therapy with appropriate spectrum    Method of antibiotic delivery:PICC line. At the end of therapy should the line be removed? No. Selecting \"yes\" will function as written order to remove PICC line at the end of therapy.    Tentative plans for disposition: Transitional Care Unit    Weekly labs required: CBC with diff, CMP, CRP and Vancomycin level. Dr. Corbin will follow labs at discharge until ID follow up. Please have labs faxed to ID clinic.    Appointment to be scheduled: Within 1-2 weeks of discharge. Type of ID Clinic Appointment Virtual Video visit. Appointment will be scheduled with: Elysia Corbin NP. Routine hospital follow up appointments are 30 minutes. If 60 minutes is necessary due to complexity of case indicate that a 60 min appointment is required. Appointment time Appointment Time: 30 minutes. If the patient remains in the hospital at this date please re-consult ID.     Imaging for ID follow up: ID Imaging: No.     ID provider to route this note to the appropriate Epic pools: [facility] INFECTIOUS DISEASE ADULT CSC, PANDA, [facility] INFUSION    [facility]/ID Clinic Information:  [facility] Clinic [address] [place] [address]  [address]  Phone: [phone]  Fax: [phone] (Attention Tova Cabral)        ID will sign off " at this time with plan to use vancomycin and cefepime as above. Frequent dosing should not be problem at TCU. Contact ID if it will be an issue when patient transitions home. Recommend trending Q72H CRP while he remains inpatient.    Shelton Meredith MD on 7/2/2023 at 3:45 PM

## 2023-07-02 NOTE — PROGRESS NOTES
"Nephrology Progress note;    S: Still has poor appetite. When asked about his tremors, he reports they are a little worse today.     BP (!) 103/35 (BP Location: Left arm)   Pulse 77   Temp 97.8  F (36.6  C) (Oral)   Resp 16   Ht 1.778 m (5' 10\")   Wt 126 kg (277 lb 12.5 oz)   SpO2 96%   BMI 39.86 kg/m      Gen - sitting up in bed, nad  CV - distant, no rub  Resp - CTA anteriorly  Abd - BS+, NT/ND  Ext - 2+ edema  Neuro: mild asterixis     Labs: Reviewed     Medications reviewed    A/Rec: 71yo M with persistent prosthetic joint infection of L hip admitted for MRSA bacteremia with recurrent HALEY.   HALEY - Cr improved to 1.4 on 6/26 but has since risen back to 2.1. Continued exposure to vanc could be contributing.. No rash to suggest AIN but cipro started 6/27 so a possibility.     --creatinine slightly higher today, does not have anephric rise and maintains a good urine output  --does not have peripheral eosinophilia, also Ciprofloxacin now stopped. Vanco levels down to 21. His complements have been checked earlier and were wnl  --does have granular casts on his UA, could be persistent ATI. Renal US with no obstruction     Volume overload - furosemide 60mg PO BID     Recommendations:     --Please hold for SBP < 110 mm of Hg  --Please check VBG to rule out hypercapnia, although less likely since his bicarb is trending down, but with his asterixis, should be ruled out.   --No indications for RRT yet, will continue to monitor closely.       Dawna Watts MD  [phone]      "

## 2023-07-02 NOTE — PROGRESS NOTES
Brief SW Note:    1345: SW left VM for pt's nephew/Financial POA, Pollo Bustos (PH: [phone]), requested a call back for an update re: if he is working with [employer]  and/or has new MA for LTC application been submitted for pt.        VANESA Minor, LSW  [facility]   PHONE: [phone]  Pager: [phone]

## 2023-07-02 NOTE — PHARMACY-VANCOMYCIN DOSING SERVICE
Pharmacy Vancomycin Note  Date of Service 2023  Patient's  1950   72 year old, male    Indication: MRSA bacteremia, vancomycin MARCOS </= 0.5  Day of Therapy: Since 6/15  Current vancomycin regimen:  Intermittent dosing  Current vancomycin monitoring method: Trough (Method 2 = manual dose calculation)  Current vancomycin therapeutic monitoring goal: 15-20 mg/L      Current estimated CrCl = Estimated Creatinine Clearance: 41 mL/min (A) (based on SCr of 2.2 mg/dL (H)).    Creatinine for last 3 days  2023:  7:43 AM Creatinine 2.09 mg/dL;  8:39 PM Creatinine 2.11 mg/dL;  9:31 PM Creatinine 2.11 mg/dL  2023: 10:48 AM Creatinine 2.18 mg/dL  2023: 11:49 AM Creatinine 2.20 mg/dL    Recent Vancomycin Levels (past 3 days)  2023:  7:43 AM Vancomycin 21.0 ug/mL  2023: 11:49 AM Vancomycin 23.1 ug/mL    Vancomycin IV Administrations (past 72 hours)                   vancomycin (VANCOCIN) 1,500 mg in 0.9% NaCl 250 mL intermittent infusion (mg) 1,500 mg New Bag 23                Nephrotoxins and other renal medications (From now, onward)    Start     Dose/Rate Route Frequency Ordered Stop    23 08  vancomycin place montilla - receiving intermittent dosing         1 each Intravenous SEE ADMIN INSTRUCTIONS 23 09  furosemide (LASIX) tablet 60 mg         60 mg Oral 2 TIMES DAILY (Diuretics and Nitrates) 23 09               Contrast Orders - past 72 hours (72h ago, onward)    None          Interpretation of levels and current regimen:  Vancomycin level is reflective of supratherapeutic level. Predicted trough level should be within goal by the evening. Will order an evening dose.     Has serum creatinine changed greater than 50% in last 72 hours: No    Urine output:  unable to determine    Renal Function: Worsening    Plan:  1. Will give one time dose of 1500 mg scheduled tonight.   2. Vancomycin monitoring method: Trough (Method 2 = manual dose  calculation)  3. Vancomycin therapeutic monitoring goal: 15-20 mg/L  4. Pharmacy will check vancomycin levels as appropriate in 1-3 Days.  5. Serum creatinine levels will be ordered daily.    Samantha Lazo RPH

## 2023-07-02 NOTE — PLAN OF CARE
"  VS: /40 (BP Location: Left arm)   Pulse 75   Temp 98.4  F (36.9  C) (Oral)   Resp 16   Ht 1.778 m (5' 10\")   Wt 126 kg (277 lb 12.5 oz)   SpO2 95%   BMI 39.86 kg/m      O2: Stable on RA >90%   Output: Voids spontaneously, urinal at bedside   Last BM: 7/1/23, incontinent, loose/watery stool   Activity: A2-3, liko   Skin: Blanchable redness on buttock, left heel with moderate drainage, blanchable redness on BL ankle, right arm brusing, left surgical incision   Pain: Denies pain, managed with tramadol, tylenol   CMS: Intact, complain of numbness/tingling in BLE, A&Ox4   Dressing: Left surgical incision dressing reinforced, mediplex on left heel   Diet: Regular diet   Dinner time , bedtime    LDA: Right port-a-cath, single lumen, saline locked   Equipment: IV pole, commode, personal belonging, liko   Plan: IV abx for 6 weeks, TCU, will continue plan of care.   Additional Info: Denies chest pain, N/V, headaches, SOB, dizziness, pt is looking for his phone. Lost it on 6/27, mag replace x2                             "

## 2023-07-03 NOTE — CARE PLAN
"VS: /48 (BP Location: Left arm)   Pulse 69   Temp 98.4  F (36.9  C) (Oral)   Resp 16   Ht 1.778 m (5' 10\")   Wt 126 kg (277 lb 12.5 oz)   SpO2 98%   BMI 39.86 kg/m     O2: RA, stable . 90%   Output: Voided on urinal in good amounts    Last BM: 7/2/23   Activity: Ax3 for cares in bed, pt not OOB    Up for meals? N/A   Skin: Redness blanchable to bilateral shins  Buttocks/coccyx abrasions, refused skin prevention interventions with powders and lotions per order.  L heel wound - DEEP due to mepilex  L hip incision    Pain: Denies pain, sleepy most of the shift.   CMS: Intact to pt baseline. Denied numbness and tingling.   Dressing: L hip and GUERRERO dressing - CDI   Diet: Regular   LDA: R chest CVC - SL   Equipment: IV pole/pump, pt belongings   Plan: Continue plan of care.   Additional Info: Patient was able to use a bed pan and voided formed stool, soft. On assessment patient's buttocks was observed with abrasion, and denied any interventions to protect the skin.       "

## 2023-07-03 NOTE — PLAN OF CARE
"         VS: /55 (BP Location: Left arm)   Pulse 72   Temp 97.6  F (36.4  C) (Oral)   Resp 16   Ht 1.778 m (5' 10\")   Wt 126 kg (277 lb 12.5 oz)   SpO2 96%   BMI 39.86 kg/m       O2: Stable on RA >90%   Output: Voids spontaneously, urinal at bedside   Last BM: 7/2/23, incontinent, loose/watery stool   Activity: A2-3, liko   Skin: Blanchable redness on buttock, left heel with moderate drainage, blanchable redness on BL ankle, right arm brusing, left surgical incision, redness in groin area   Pain: Denies pain, managed with tramadol, tylenol   CMS: Intact, complain of numbness/tingling in BLE, A&Ox4   Dressing: Left surgical incision dressing reinforced, mediplex on left heel   Diet: Regular diet   Dinner time , bedtime    LDA: Right port-a-cath, single lumen, infusing vanco at 167ml/hr, blood return noted.   Equipment: IV pole, commode, personal belonging, liko   Plan: IV abx for 6 weeks, TCU, will continue plan of care.   Additional Info: Denies chest pain, N/V, headaches, SOB, dizziness                    "

## 2023-07-03 NOTE — PROGRESS NOTES
Care Management Follow Up    Length of Stay (days): 16    Expected Discharge Date: TBD     BARRIERS TO DISCHARGE: Pt requiring assist of 3 for mobility and cares; currently doesn't have short-term rehab needs; although pt has skilled need for IV abx, pt does not have Medicare days left;  no funding readily available for LTC.       Concerns to be Addressed: Discharge planning  Patient plan of care discussed at interdisciplinary rounds: Yes     Anticipated Discharge Disposition:  Return to [facility], pending rehab potential vs LTC and new SNF after MA for LTC application is approved.     Anticipated Discharge Services:  Post acute therapies; 6 week IV abx     Anticipated Discharge DME:  None     Patient/family educated on Medicare website which has current facility and service quality ratings:  Not at this time  Education Provided on the Discharge Plan:  Yes  Patient/Family in Agreement with the Plan:  Yes     Referrals Placed by CM/SW:  None at this time  Private pay costs discussed: MA for LTC eligibility and necessity to secure payment for TCU/LTC previously discussed during pt's 05/26/23 - 06/02/23 hospitalization.     Additional Information:  Per IDT rounds, pt is A2 with therapy, but still A2-3 for nursing cares, which [facility] cannot manage. Psych has deemed pt to have decisional capacity. Palliative signed off d/t pt's decisional capacity and statements of GOC to be restorative. MD to connect with pt again, as pt had expressed a little more interest in taking anti-depressants. Pt continues only working with PT 3x/week d/t lack of participation.      1600: SW left (3rd)  for pt's nephew/Financial POA, Pollo Bustos (PH: [phone]), requested a call back for an update re: MA for LTC application and/or status of asset spend down progress.          ANTONETTE MinorW, LSW  [facility]   PHONE: [phone]  Pager: [phone]

## 2023-07-03 NOTE — PLAN OF CARE
Goal Outcome Evaluation:      Plan of Care Reviewed With: patient    Overall Patient Progress: improving    Outcome Evaluation: Spent time up in chair      VS: Temp: (!) 96.1  F (35.6  C) Temp src: Oral BP: (!) 141/61 Pulse: 78   Resp: 18 SpO2: 94 % O2 Device: None (Room air)      O2: RA   Output: 300 out, drank 120 mL, Received 100 mL IVF   Last BM: Today   Activity: Ax2-3 wallace. Did get up in chair with help from therapy today.   Skin: Groin, buttocks, inguinal folds and caridad area red and excoriated. Pt refuses repositioning, medicated powder, brief or chux.    Pain: High, received ultram at 1400.    Neuro: A&O x4, lethargic, unmotivated, flat, withdrawn   Dressing: CDI   Diet: Regular   LDA: CVC port R chest   Equipment: wallace Ayala   Plan: TCU when stable from infection   Additional Info:              negative

## 2023-07-03 NOTE — PLAN OF CARE
Pt is alert and oriented x4. W/ flat affect. Denies pain, cp or SOB. Not OOB this shift. A3 w/ transfers and cares. Pt uses lift for transfers. Declined repositioning. W/ CVC R chest, saline locked. Hip dressing was changed early morning by provider, JACKELIN. Call light is in reach, continue.

## 2023-07-03 NOTE — PROGRESS NOTES
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     #HALEY - Cr improved to 1.4 on 6/26 but has since risen back to 2.2 yesterday, today's labs pending. Continued exposure to vanc could be contributing.. No rash to suggest AIN but cipro started 6/27 so a possibility.      --creatinine does not have anephric rise and maintains a good urine output  --does not have peripheral eosinophilia, also Ciprofloxacin now stopped. Vanco levels down to 21. His complements have been checked earlier and were wnl  --does have granular casts on his UA, could be persistent ATI. --Renal US with no obstruction     Please check VBG to rule out hypercapnia - pending, although less likely since his bicarb is trending down, but with his asterixis, should be ruled out.   --No indications for RRT yet, will continue to monitor closely.      #Volume overload - continue furosemide 60mg PO BID  - UOP 1.6 L yesterday  - BP's 100-110s systolic Friday, up to 120s yesterday and 141/76 today.  --Please hold furosemide for SBP < 110 mm of Hg    #Electrolytes  - today's labs pending, K and Na have been normal, Bicarb was 21 yesterday, down from 23 the day before    Recommendations were communicated to primary team via this note.     Discussed with Dr. Bryan.     OPAL MAYER, PADeeptiC     Interval History :   Reviewed provider and nursing notes for past 24 hours. Pt feeling okay today. Continues to make large amount of UOP. He has no fevers/chills. Denies shortness of breath, no change in LE edema.       Review of Systems:   A comprehensive review of systems was negative except as noted above.    Physical Exam:   I/O last 3 completed shifts:  In: -   Out: 1875 [Urine:1875]  Vitals: BP  Pulse  Resp  SpO2 %  Wt  GENERAL APPEARANCE: alert and no distress  EYES:  no scleral icterus, pupils equal  PULM: lungs clear to  auscultation, no crackles   CV: regular rhythm, normal rate  MS: no evidence of inflammation in joints, no muscle tenderness  NEURO: mentation intact and speech normal      Labs:   All labs reviewed by me  Electrolytes/Renal - Recent Labs   Lab Test 07/03/23  0816 07/03/23  0200 07/02/23  2235 07/02/23  1754 07/02/23  1149 07/01/23  2208 07/01/23 2007 07/01/23  1106 07/01/23  1048 06/30/23 2208 06/30/23  2131 06/30/23  1724 06/30/23  1315 06/29/23  0809 06/29/23  0720 06/26/23  0738 06/26/23  0513 06/25/23  0812 06/25/23  0615   NA  --   --   --   --  137  --   --   --  138  --  136   < >  --   --  140   < > 139  --  139   POTASSIUM  --   --   --   --  4.3  --   --   --  4.4  --   --   --  4.5   < > 4.3   < > 4.7  --  4.6   CHLORIDE  --   --   --   --  107  --   --   --  106  --   --   --   --   --  110*   < > 110*  --  108*   CO2  --   --   --   --  21*  --   --   --  23  --   --   --   --   --  22   < > 20*  --  22   BUN  --   --   --   --  39.1*  --   --   --  38.5*  --   --   --   --   --  39.2*   < > 42.8*  --  46.0*   CR  --   --   --   --  2.20*  --   --   --  2.18*  --  2.11*   < >  --    < > 1.98*   < > 1.42*  --  1.65*   * 135* 188*   < > 128*   < >  --    < > 151*   < >  --    < >  --    < > 111*   < > 134*   < > 142*   MAIKOL  --   --   --   --  7.9*  --   --   --  7.8*  --   --   --   --   --  7.7*   < > 7.6*  --  7.5*   MAG  --   --   --   --  1.7  --  1.5*  --  1.4*  --   --   --   --   --   --   --   --   --  1.8   PHOS  --   --   --   --   --   --   --   --  3.7  --   --   --   --   --   --   --  3.7  --  3.9    < > = values in this interval not displayed.       CBC -   Recent Labs   Lab Test 07/01/23  1048 06/26/23  0513 06/25/23  0615 06/24/23  0654   WBC 11.7*  --  13.9* 14.9*   HGB 7.6* 7.6* 6.8* 7.3*     --  310 290       LFTs -   Recent Labs   Lab Test 06/26/23  0513 06/25/23  0615 06/24/23  0654 06/22/23  0711 06/20/23  0704 06/19/23  0915 06/16/23  0605   ALKPHOS  --   --   --    --  93 94 114   BILITOTAL  --   --   --   --  0.2 0.2 0.2   ALT  --   --   --   --  14 12 10   AST  --   --   --   --  14 19 16   PROTTOTAL  --   --   --   --  5.9* 6.2* 6.2*   ALBUMIN 2.2* 2.3* 2.4*   < > 2.3* 2.6* 2.8*    < > = values in this interval not displayed.       Iron Panel -   Recent Labs   Lab Test 06/26/23  0513   IRON 30*   IRONSAT 21   FADI 2,616*         Imaging:  Reviewed     Current Medications:    apixaban ANTICOAGULANT  5 mg Oral BID     atorvastatin  40 mg Oral Daily     ceFEPIme  2 g Intravenous Q12H     ferrous sulfate  325 mg Oral Every Other Day     folic acid  1 mg Oral Daily     furosemide  60 mg Oral BID     heparin lock flush  5-20 mL Intracatheter Q24H     insulin aspart  1-7 Units Subcutaneous TID AC     insulin aspart  1-5 Units Subcutaneous At Bedtime     lactobacillus rhamnosus (GG)  1 capsule Oral BID     [Held by provider] methocarbamol  500 mg Oral 4x Daily     miconazole   Topical BID     multivitamin w/minerals  1 tablet Oral Daily     polyethylene glycol  17 g Oral BID     senna-docusate  1 tablet Oral BID     sodium chloride (PF)  10 mL Intracatheter Q8H     sodium chloride (PF)  10-40 mL Intracatheter Q8H     vancomycin place montilla - receiving intermittent dosing  1 each Intravenous See Admin Instructions       OPAL MAYER, DIANA

## 2023-07-03 NOTE — PROGRESS NOTES
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  Postop course complicated by profound deconditioning.  He was admitted to TCU initially on 12/23/2022 for physical rehabilitation, however therapies no longer worked with patient after several months of an in bed therapy and his refusals to mobilize.  Underwent biopsy on 4/7/2023 without evidence of infection.  He was transferred back to [facility] for left total hip arthroplasty and reimplantation on 5/26/23 with Dr. Meyers.  Presented again to [facility] for persistent MRSA bacteremia on 6/17.     Brief Hospital Summary:   Pt was admitted to continue infectious workup with MRSA bacteremia.  Admission blood cultures were positive, ID and Ortho were consulted. KEILA was obtained on 6/19, which was negative for endocarditis, ortho took for washout/debridement of hip on 6/21. ID recommending 6 weeks abx (6/21-8/1), patient recovering from HALEY, but given history of HALEY's and need for long term vanc plus comorbids making declining kidney function in the future probable nephrology doesn't feel comfortable with patient getting a PICC -- Tunneled internal jugular line placed. Transitioned cefepime to cipro back to cefepime given HALEY and prolonged qtc, continue vancomycin.     Plan for today:  - Decrease lasix to 60 mg daily from BID due to increase in creatinine  - pain stable off of robaxin, still reports intermittent fogginess but felt better during our encounter  - Confusion worse in the morning. Patient with  history of JAIR, had been on CPAP with regular use until this past year, will resume.   - LFTs in AM, given asterixis (VBG with normal CO2)    MRSA bacteremia due to possible left hip septic arthritis vs other source Blood cultures: 6/14, 6/15 + for MRSA (4/4), and 6/16 (1/2 GPCs) and 6/17 (2/2) GPCs. - started vancomycin 6/15.   6/17 sinovial fluid cultures with staph and klebsiella. Purulent drainage noted at incision site at TCU, CT left hip concerning for infection (no mention of joint, mostly anterior in soft tissue) and couldn't exclude abscess/inf  - Continue vancomycin, added rocephin 6/21 and changed to cefepime 6/23; ID recommends 6 weeks IV vanc (last day 8/1/23) and can transition to oral cipro 750 mg BID at discharge, needs weekly vanc level, cbcd, cmp, crp while on IV abx, regular ECG monitoring of QTc on high dose cipro --> follow up outpatient with ID prior to discontinuing antibiotics (needs appt end of July)  - blood cultures negative 6/19 and 6/20 (last + was 6/17), trend CRP q48h (way down since initiation of treatment and washout)  - intraop cultures are growing Staph, Pseudomonas, and C acnes    Acute Kidney Injury - multifactorial, see below  HALEY recurrent 6/29, repeat 500 ml bolus with no improvement. No evidence of hypovolemia. Nephrology consulted, appreciate recs.   - Renal ultrasound ordered  - baseline creatinine 1.0 or 1.1  - etiology: suspect combo of sepsis/inflammation, hypovolemia, blood loss anemia following surgery, vancomycin?  - volume (hypovolemia/congestion): euvolemic but tough to assess based on habitus  - nephrotoxins: vancomycin  - diabetes: yes; last A1C < 6, though  - sepsis: resolved now  - anemia: yes, ongoing  - cardiac performance: normal based on KEILA from 6/19  - inflammation: from surgery and infection  - creatinine downtrending; neph ordered C3 and 4, normal    Bilateral LE edema due to volume overload from HALEY and iatrogenic from blood transfusions  - started  lasix 60 mg PO daily (decrease from BID due to HALEY)    Hypoxemic Respiratory Failure due to immobility, surgery - resolved (sena)  - patient with fluctuating oxygen requirement but off O2 since 6/23  - continue to follow; encourage IS    Acute blood loss anemia following surgery, complicated by use of blood thinner  - hgb 7.6 this morning; got 3 units day after surgery did joint washout (6/22) for acute blood loss anemia, 1 unit 6/25  - watch for bleeding; hold apixaban if need be - had unprovoked dvt in 2018 and would be on eliquis for life probably, at really high risk of recurrent DVT given recent procedures and illness/immobility  - I added on some iron studies and B12 level today; interpret with caution given transfusions; probably wouldn't hurt to supplement iron and folic acid given rapid turnover and need for transfusions, ordered to start tomorrow      S/p left hip explantation of left hip antibiotic spacer and revision of left total hip arthroplasty (5/26/2023)  - ortho performed washout 6/21; drain to come out in 2 weeks  -Activity: 50% PWB LLE with posterior hip precautions [id number]  -AFO ordered for foot drop but patient refusing to use  -PT/OT  -Knee to be kept even in a foam leg elevator to relax sciatic nerve  -Pain: Scheduled robaxin 4 times a day, APAP PRN                 -oxycodone  to 5-7.5 mg discontinued                 -tramadol 25-50 mg every 6 hours PRN    Hx of unprovoked DVT in 2018   - on eliquis 5 mg bid     Altered mental status due to Delirium from illness/hospitalization  resolved  - delirium precuations      Type 2 diabetes mellitus  - Last hemoglobin A1c 5.6% on 6/16/2023.  On glipizide, metformin, Actos, and Victoza prior to hospitalizations and surgery.   - continue ISS    Constipation  -Continue Colace 100 mg twice daily  -Continue senna 1 tablet twice daily  -Continue MiraLAX daily     Hypomagnesemia  -monitor and replete      Severe malnutrition in the context of acute on  "chronic illness  Goal for patient is to consume % of meals TID. Goal is 1832-3188 kcals/daily. Protein needs- 100-126 grams/daily.   -RD consult and appreciate recs  -regular diet  -Weekly weights, daily I/O     Adjustment disorder with depressed mood  Prolonged grief disorder  Patient has been with depressed mood in setting of ongoing health issues, as evidenced by lack of motivation to work with therapies during both TCU admissions.  Past history of passive suicidal statements. Psychiatry saw patient during hospital admission on 5/31 due to lack of motivation with therapies. Patient declined interest in starting any psychiatric medications. Was willing to engage with health psychology.   -Outpatient health psychology consult  -Continue to monitor for willingness to start antidepressant therapy     Stable and Resolved Issues:  Hyperlipidemia-continue PTA Lipitor 40 mg daily  JAIR-CPAP when sleeping         Diet: Snacks/Supplements Adult: Other; Chocholate or Butter Pecan Glucerna with bkf trays daily; With Meals  Regular Diet Adult  Room Service    DVT Prophylaxis: DOAC  Tran Catheter: Not present  Lines: PRESENT      CVC Single Lumen Right Internal jugular Non - valved (open ended);Tunneled;Power injectable-Site Assessment: WDL      Cardiac Monitoring: None  Code Status: Full Code      Clinically Significant Risk Factors            # Hypomagnesemia: Lowest Mg = 1.4 mg/dL in last 2 days, will replace as needed   # Hypoalbuminemia: Lowest albumin = 2.2 g/dL at 6/26/2023  5:13 AM, will monitor as appropriate    # Acute Kidney Injury, unspecified: based on a >150% or 0.3 mg/dL increase in last creatinine compared to past 90 day average, will monitor renal function         # Obesity: Estimated body mass index is 39.86 kg/m  as calculated from the following:    Height as of this encounter: 1.778 m (5' 10\").    Weight as of this encounter: 126 kg (277 lb 12.5 oz).   # Severe Malnutrition: based on nutrition " assessment         Disposition Plan           Caden Frazier MD  Hospitalist Service, GOLD TEAM 21  [facility]  Securely message with [employer] (more info)  Text page via [employer] Paging/Directory   See signed in provider for up to date coverage information  ______________________________________________________________________    Interval History   No acute events overnight. Confused this morning, better in afternoon.     Physical Exam   Vital Signs: Temp: (!) 96.1  F (35.6  C) Temp src: Oral BP: (!) 141/61 Pulse: 78   Resp: 18 SpO2: 94 % O2 Device: None (Room air)    Weight: 277 lbs 12.47 oz    Constitutional: sitting in bed, anxious appearing   CV: regular rate and rhythm, no murmurs rubs or gallops  Resp: clear to auscultation bilaterally  Abd: soft, non-tender, non-distended, no hepatosplenomegaly or masses palpated  Neuro: alert and oriented x 3, speech clear      Medical Decision Making       50 MINUTES SPENT BY ME on the date of service doing chart review, history, exam, documentation & further activities per the note.      Data     I have personally reviewed the following data over the past 24 hrs:    N/A  \   N/A   / N/A     137 107 39.1 (H) /  119 (H)   4.3 21 (L) 2.20 (H) \       Imaging results reviewed over the past 24 hrs:   No results found for this or any previous visit (from the past 24 hour(s)).

## 2023-07-03 NOTE — PROGRESS NOTES
"Orthopedic Surgery Progress Note: 07/03/2023    Subjective:   No acute events overnight. AVSS. Little movement over weekend. Left leg still very sensitive. Unable to assist much with turn to right side this morning to do dressing change/drain removal. Awaiting TCU. Brother visited this weekend.    Objective:   /55 (BP Location: Left arm)   Pulse 72   Temp 97.6  F (36.4  C) (Oral)   Resp 16   Ht 1.778 m (5' 10\")   Wt 126 kg (277 lb 12.5 oz)   SpO2 96%   BMI 39.86 kg/m    No intake/output data recorded.  General: NAD. Resting comfortably in bed.  Respiratory: Breathing comfortably on RA.  Drain Output:  Output by Drain (mL) 07/01/23 0700 - 07/01/23 1459 07/01/23 1500 - 07/01/23 2259 07/01/23 2300 - 07/02/23 0659 07/02/23 0700 - 07/02/23 1459 07/02/23 1500 - 07/02/23 2259 07/02/23 2300 - 07/03/23 0557   Closed/Suction Drain Anterior;Left Thigh Bulb 15 Greek  5 30   0   Musculoskeletal: Focused exam of the left lower extremity: Drain removed. Incision healing well. New dressings placed.  Fires GSC, TA, FHL, and EHL minimally. Incredibly sensitive to touch on feet. Overall decreased sensation distally. Brisk capillary refill.     Laboratory Data:  Lab Results   Component Value Date    WBC 11.7 (H) 07/01/2023    HGB 7.6 (L) 07/01/2023     07/01/2023      Intraoperative cultures   6/21/2023: MRSA, C Acnes, Pseudomonas    Assessment & Plan:   Waldo Bustos is a 72 year old male status post left revision total hip arthroplasty on 5/26/2023 with Dr. Meyers now presenting with left hip prosthetic joint infection status post left hip irrigation and debridement on 6/21/2023 with Dr. Meyers.    CRP downtrending. C acnes, MRSA in cultures. Treating with vanc/cefepime, per ID recs. Appreciate assistance.    Consider re-engaging /psych.     Goals for Today:  - Pain control  - Mobilization - appreciate therapies recs  - Discharge planning    Ortho Plan:  - Activity: Up with assist until independent. " Posterior hip precautions x 3 months (no flexion beyond 90 degrees, no adduction beyond midline, and no internal rotation beyond midline).  - Weightbearing Status: WBAT LLE.  - Pain Management: Transition from IV to PO as tolerated.  - Antibiotics: Vancomycin and cefepime, per ID.  - Diet: OK for a diet from an orthopedic perspective.  - DVT Prophylaxis: SCDs and PTA apixaban 5 mg BID.  - Imaging: None pending.  - Labs: Labs PRN and per primary  - Bracing/Splinting: Abduction pillow while in bed.  - Dressings: Dressing changed 7/3.  - Drains: GUERRERO drain removed 7/3  - Tran Catheter: None.   - Physical Therapy/Occupational Therapy: Evaluation and treatment.  - Cultures: Intraoperative cultures 6/21/2023.  - Follow-up: Clinic with Dr. Meyers's team TBD (pending dispo plan). Will plan to get XR at that time.    - Disposition: TCU. Okay to discharge from an ortho perspective.     Hannah Starr MD, PGY-4  Orthopedics  Pager: [phone]

## 2023-07-03 NOTE — PLAN OF CARE
"  VS: /55 (BP Location: Left arm)   Pulse 72   Temp 97.6  F (36.4  C) (Oral)   Resp 16   Ht 1.778 m (5' 10\")   Wt 126 kg (277 lb 12.5 oz)   SpO2 96%   BMI 39.86 kg/m    Pt denies chest pain.    O2: >90% on RA. Lung sounds clear and equal bilaterally.    Output: Oliguria to urinal overnight.    Last BM: 7/2/23 per pt report   Activity: Ax2-3 for cares in bed. Pt refuses repositioning.    Up for meals? N/A   Skin: Redness/blanchable to bilateral shins. Abrasion/wound to buttocks. L hip incision.    Pain: Managed with PRN medications   CMS: Numbness to L foot per pt report. Pt declined pulse assessment to LLE.  A&O x4   Dressing: L hip dressing changed by Ortho in early AM of 7/3/23 - CDI    Diet: Regular   LDA: R chest CVC - SL   Equipment: IV pole/pump and pt belongings   Plan: TBD   Additional Info: Pt reports missing cell phone since 6/27 when he went to IR. Missing phone reported to charge RN overnight and passed along to AM shift as well.        "

## 2023-07-04 NOTE — PLAN OF CARE
VS: /50 (BP Location: Left arm, Patient Position: Semi-Fuentes's)   Pulse 75   Temp 97.9  F (36.6  C) (Oral)   Resp 16   SpO2 93%     O2: O2 SATS >90% denies chest pain,  or SBO   Output: Voids adequately using urinal   Last BM: 7/3/23 BS present all x4 quadrants    Activity: Not OOB this shift AOx3  with cares  refused repositioning   attempts made but pt refused    Up for meals? N/A   Skin: L hip surgical incision Redness/blanchable to bilateral shins. Abrasion/wound to buttocks. Refused powder or barrier cream   Wound to L heel    Pain: Pain managed with prn ,Tramadol   CMS: Intact  denies numbness and tingling    Dressing: CDI    Diet: Regular diet diabetic with BG checks and sliding scale    LDA: R chest CVC - SL   Equipment: IV Pole,  celling lift personal belongings   Plan: Continue with plan of care    Additional Info:

## 2023-07-04 NOTE — PHARMACY-VANCOMYCIN DOSING SERVICE
Pharmacy Vancomycin Note  Date of Service 2023  Patient's  1950   72 year old, male    Indication: MRSA bacteremia, vancomycin MARCOS </=0.5  Day of Therapy: started 6/15/2023  Current vancomycin regimen: intermittent dosing; last dosed   Current vancomycin monitoring method: Trough (Method 2 = manual dose calculation)  Current vancomycin therapeutic monitoring goal: 15-20 mg/L        Current estimated CrCl = Estimated Creatinine Clearance: 31 mL/min (A) (based on SCr of 2.91 mg/dL (H)).    Creatinine for last 3 days  2023: 11:49 AM Creatinine 2.20 mg/dL  7/3/2023: 11:38 AM Creatinine 2.51 mg/dL  2023:  6:27 AM Creatinine 2.91 mg/dL    Recent Vancomycin Levels (past 3 days)  2023: 11:49 AM Vancomycin 23.1 ug/mL  2023: 12:37 PM Vancomycin 25.5 ug/mL    Vancomycin IV Administrations (past 72 hours)                   vancomycin (VANCOCIN) 1,500 mg in 0.9% NaCl 250 mL intermittent infusion (mg) 1,500 mg New Bag 23 2249                Nephrotoxins and other renal medications (From now, onward)    Start     Dose/Rate Route Frequency Ordered Stop    23 0800  [Held by provider]  furosemide (LASIX) tablet 60 mg        (Held by provider since 2023 at 0943 by Hector Burnham MD.Hold Reason: Acute Kidney Injury)    60 mg Oral DAILY 23 1538      23 0813  vancomycin place montilla - receiving intermittent dosing         1 each Intravenous SEE ADMIN INSTRUCTIONS 23 0813               Contrast Orders - past 72 hours (72h ago, onward)    None          Interpretation of levels and current regimen:  Vancomycin level is reflective of supratherapeutic level    Has serum creatinine changed greater than 50% in last 72 hours: Scr increased from 2.51-->2.91    Urine output:  diminished urine output    Renal Function: Worsening      Plan:  1. Do not dose vancomcyin given Supratherapeutic level  2. Vancomycin monitoring method: Trough (Method 2 = manual dose  calculation)  3. Vancomycin therapeutic monitoring goal: 15-20 mg/L  4. Pharmacy will check vancomycin levels as appropriate in Pharmacy will check level w/ AM labs 7/5 and redose if approp.  5. Serum creatinine levels will be ordered daily for the first week of therapy and at least twice weekly for subsequent weeks.    Mili Dos Santos RPH

## 2023-07-04 NOTE — PLAN OF CARE
"Goal Outcome Evaluation:      Plan of Care Reviewed With: patient    Overall Patient Progress: no change     Pt alert to self, DEEP otherwise d/t lack responses to questions. Pt speaks very slowly, sometimes does not respond at all. Pt continent of bladder, uses bedside urinal. Pt intermittently refuses cares. This AM, pt agreed to attempt repositioning. Moved pt leg 1/4 inch, pt yelped and refused any other repositioning. Offered ice, pt indecisive, eventually refused.  Paged MD for PRN pain med for turning, MD prescribed PO oxycodone. Gave pt PO Oxycodone and Tylenol, pt indecisive about whether to take or not. Writer stood in pt room for 10 min for meds, pt trying to urinate, left to give pt privacy. Checked back on pt later, pt did not take Tylenol or Oxycodone, wasted pain meds. Pt has not been repositioned in 12 hours, pt educated throughout shift of risks.    Pt became more agitated throughout shift, refused more and more cares. Pt received antibiotic, line flushed with NS. Pt trying to call girlfriend Claire, but struggling to dial phone, would turn it on and off. After writer helped pt call, girlfriend did not answer. Pt refused heparin, pt angrily stated \"I don't want any fucking thing!\" Writer left room to let pt calm down. Could not place cap on CVC, pt refused all cares. Oncoming RN aware.    Pt has not eaten during entire shift, refuses food. Last .    MD paged, STAT CT and venous blood gas ordered d/t declining neuro.    Continue to monitor.      "

## 2023-07-04 NOTE — PROGRESS NOTES
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     #HALEY - Cr improved to 1.4 on 6/26 but has since risen back to 2.9 likely secondary to hemodynamic instability as he was hypotensive two days ago and overdiuresis. Continued exposure to vanc could be contributing.   -Please stop furosemide  -VBG to ruled out hypercapnia  --No indications for RRT yet, will continue to monitor closely.      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 Pt more confused today.    Review of Systems:   A comprehensive review of systems was negative except as noted above.    Physical Exam:   I/O last 3 completed shifts:  In: 100 [I.V.:100]  Out: 400 [Urine:400]  Vitals: BP  Pulse  Resp  SpO2 %  Wt  GENERAL APPEARANCE: alert and no distress  EYES:  no scleral icterus, pupils equal  PULM: lungs clear to auscultation, no crackles   CV: regular rhythm, normal rate  MS: no evidence of inflammation in joints, no muscle tenderness  NEURO: confused      Labs:   All labs reviewed by me  Electrolytes/Renal -   Recent Labs   Lab Test 07/04/23  0627 07/04/23  0148 07/03/23  2210 07/03/23  1346 07/03/23  1138 07/02/23  1754 07/02/23  1149 07/01/23  2208 07/01/23 2007 07/01/23  1106 07/01/23  1048 06/26/23  0738 06/26/23  0513 06/25/23  0812 06/25/23  0615     --   --   --  140  --  137  --   --   --  138   < > 139  --  139   POTASSIUM 4.2  --   --   --  4.2  --  4.3  --   --   --  4.4   < > 4.7  --  4.6   CHLORIDE 106  --    --   --  108*  --  107  --   --   --  106   < > 110*  --  108*   CO2 22  --   --   --  20*  --  21*  --   --   --  23   < > 20*  --  22   BUN 41.7*  --   --   --  39.0*  --  39.1*  --   --   --  38.5*   < > 42.8*  --  46.0*   CR 2.91*  --   --   --  2.51*  --  2.20*  --   --   --  2.18*   < > 1.42*  --  1.65*   * 113* 139*   < > 120*   < > 128*   < >  --    < > 151*   < > 134*   < > 142*   MAIKOL 8.0*  --   --   --  7.9*  --  7.9*  --   --   --  7.8*   < > 7.6*  --  7.5*   MAG  --   --   --   --   --   --  1.7  --  1.5*  --  1.4*  --   --   --  1.8   PHOS  --   --   --   --   --   --   --   --   --   --  3.7  --  3.7  --  3.9    < > = values in this interval not displayed.       CBC -   Recent Labs   Lab Test 07/04/23 0627 07/03/23  1138 07/01/23  1048   WBC 12.0* 11.6* 11.7*   HGB 7.5* 7.5* 7.6*    403 362       LFTs -   Recent Labs   Lab Test 07/04/23 0627 06/26/23  0513 06/25/23  0615 06/22/23  0711 06/20/23  0704 06/19/23  0915   ALKPHOS 99  --   --   --  93 94   BILITOTAL 0.2  --   --   --  0.2 0.2   ALT 14  --   --   --  14 12   AST 11  --   --   --  14 19   PROTTOTAL 5.9*  --   --   --  5.9* 6.2*   ALBUMIN 2.6* 2.2* 2.3*   < > 2.3* 2.6*    < > = values in this interval not displayed.       Iron Panel -   Recent Labs   Lab Test 06/26/23  0513   IRON 30*   IRONSAT 21   FADI 2,616*         Imaging:  Reviewed     Current Medications:    apixaban ANTICOAGULANT  5 mg Oral BID     atorvastatin  40 mg Oral Daily     ceFEPIme  2 g Intravenous Q12H     ferrous sulfate  325 mg Oral Every Other Day     folic acid  1 mg Oral Daily     furosemide  60 mg Oral Daily     heparin lock flush  5-20 mL Intracatheter Q24H     insulin aspart  1-7 Units Subcutaneous TID AC     insulin aspart  1-5 Units Subcutaneous At Bedtime     lactobacillus rhamnosus (GG)  1 capsule Oral BID     [Held by provider] methocarbamol  500 mg Oral 4x Daily     miconazole   Topical BID     multivitamin w/minerals  1 tablet Oral Daily      polyethylene glycol  17 g Oral BID     senna-docusate  1 tablet Oral BID     sodium chloride (PF)  10 mL Intracatheter Q8H     sodium chloride (PF)  10-40 mL Intracatheter Q8H     vancomycin place montilla - receiving intermittent dosing  1 each Intravenous See Admin Instructions     I have reviewed the history of present illness and the patient's clinical course. I personally evaluated, interviewed and examined the patient on the date of the encounter.       I reviewed the relevant labs, previous notes and imaging studies, and participated in the formulation of a diagnostic and treatment plan. This note reflects my direct involvement in the patient's care.     40 minutes spent on the date of the encounter doing chart review, history and exam, documentation and further activities as noted above    Rea Bryan MD

## 2023-07-04 NOTE — PLAN OF CARE
VS: /50 (BP Location: Left arm, Patient Position: Semi-Fuentes's)   Pulse 75   Temp 97.9  F (36.6  C) (Oral)   Resp 16   SpO2 93%     O2: O2 SATS >90% denies chest pain,  or SBO   Output: Voids adequately using urinal   Last BM: 7/3/23 BS present all x4 quadrants    Activity: Not OOB this shift AOx3  with cares  refused repositioning    Up for meals? N/A   Skin: L hip surgical incision Redness/blanchable to bilateral shins. Abrasion/wound to buttocks.   Wound to L heel    Pain: Pain managed with prn ,Tramadol   CMS: Intact  denies numbness and tingling    Dressing: CDI    Diet: Regular diet diabetic with BG checks and sliding scale    LDA: R chest CVC - SL   Equipment: IV Pole,  celling lift personal belongings   Plan: Continue with plan of care    Additional Info:

## 2023-07-04 NOTE — PROGRESS NOTES
"Orthopedic Surgery Progress Note: 07/04/2023    Subjective:   No acute events overnight. AVSS. Little movement. Left leg still very sensitive. Awaiting TCU. Wished him happy 4th of July. Unclear if brother will visit.    Objective:   /59 (BP Location: Left arm)   Pulse 74   Temp 98.3  F (36.8  C) (Axillary)   Resp 16   Ht 1.778 m (5' 10\")   Wt 126 kg (277 lb 12.5 oz)   SpO2 92%   BMI 39.86 kg/m    No intake/output data recorded.  General: NAD. Resting comfortably in bed.  Respiratory: Breathing comfortably on RA.  Drain removed 7/3  Musculoskeletal: Focused exam of the left lower extremity: Dressings c/d/i.  Fires GSC, TA, FHL, and EHL minimally. Incredibly sensitive to touch on feet. Overall decreased sensation distally. Brisk capillary refill.     Laboratory Data:  Lab Results   Component Value Date    WBC 12.0 (H) 07/04/2023    HGB 7.5 (L) 07/04/2023     07/04/2023      Intraoperative cultures   6/21/2023: MRSA, C Acnes, Pseudomonas    Assessment & Plan:   Waldo Bustos is a 72 year old male status post left revision total hip arthroplasty on 5/26/2023 with Dr. Meyers now presenting with left hip prosthetic joint infection status post left hip irrigation and debridement on 6/21/2023 with Dr. Meyers.    CRP downtrending. C acnes, MRSA in cultures. Treating with vanc/cefepime, per ID recs. Appreciate assistance.    Ortho will continue to follow every few days. Please do not hesitate to page with questions/concerns.    Goals for Today:  - Pain control  - Mobilization - appreciate therapies recs  - Discharge planning    Ortho Plan:  - Activity: Up with assist until independent. Posterior hip precautions x 3 months (no flexion beyond 90 degrees, no adduction beyond midline, and no internal rotation beyond midline).  - Weightbearing Status: WBAT LLE.  - Pain Management: Transition from IV to PO as tolerated.  - Antibiotics: Vancomycin and cefepime, per ID.  - Diet: OK for a diet from an orthopedic " perspective.  - DVT Prophylaxis: SCDs and PTA apixaban 5 mg BID.  - Imaging: None pending.  - Labs: Labs PRN and per primary  - Bracing/Splinting: Abduction pillow while in bed.  - Dressings: Dressing changed 7/3.  - Drains: GUERRERO drain removed 7/3  - Tran Catheter: None.   - Physical Therapy/Occupational Therapy: Evaluation and treatment.  - Cultures: Intraoperative cultures 6/21/2023.  - Follow-up: Clinic with Dr. Meyers's team TBD (pending dispo plan). Will plan to get XR at that time.    - Disposition: TCU. Okay to discharge from an ortho perspective.     Hannah Starr MD, PGY-4  Orthopedics  Pager: [phone]

## 2023-07-04 NOTE — PROGRESS NOTES
[facility]    Medicine Progress Note - Hospitalist Service, GOLD TEAM 21    Date of Admission:  6/17/2023    Assessment & Plan   Waldo Bustos is a 72 year old male with a history of type 2 diabetes, hyperlipidemia, chronic back pain, JAIR, DVT/PE on DOAC, and chronic left hip prosthetic joint infection initially admitted to [facility] on 11/22/2022 for scheduled explant of left hip prosthesis, debridement, and reconstruction with antibiotic spacer.  Postop course complicated by profound deconditioning.  He was admitted to TCU initially on 12/23/2022 for physical rehabilitation, however therapies no longer worked with patient after several months of an in bed therapy and his refusals to mobilize.  Underwent biopsy on 4/7/2023 without evidence of infection.  He was transferred back to [facility] for left total hip arthroplasty and reimplantation on 5/26/23 with Dr. Meyers.  Presented again to [facility] for persistent MRSA bacteremia on 6/17.     Brief Hospital Summary:    Pt was admitted to continue infectious workup with MRSA bacteremia.  Admission blood cultures were positive, ID and Ortho were consulted. KEILA was obtained on 6/19, which was negative for endocarditis, ortho took for washout/debridement of hip on 6/21. ID recommending 6 weeks abx (6/21-8/1), patient recovering from HALEY, but given history of HALEY's and need for long term vanc plus comorbids making declining kidney function in the future probable nephrology doesn't feel comfortable with patient getting a PICC -- Tunneled internal jugular line placed. Transitioned cefepime to cipro back to cefepime given HALEY and prolonged qtc, continue vancomycin.     Plan for today:  - hold diuretics today per neph  - discussed transitioning off cefepime with ID due to wordfinding issues and intermittent confusion. Will consider ceftazidime   - low dose (2.5 mg) oxy with position changes/therapy    MRSA  bacteremia due to possible left hip septic arthritis vs other source Blood cultures: 6/14, 6/15 + for MRSA (4/4), and 6/16 (1/2 GPCs) and 6/17 (2/2) GPCs. - started vancomycin 6/15.   6/17 sinovial fluid cultures with staph and klebsiella. Purulent drainage noted at incision site at TCU, CT left hip concerning for infection (no mention of joint, mostly anterior in soft tissue) and couldn't exclude abscess/inf  - Continue vancomycin, added rocephin 6/21 and changed to cefepime 6/23; ID recommends 6 weeks IV vanc (last day 8/1/23) and can transition to oral cipro 750 mg BID at discharge, needs weekly vanc level, cbcd, cmp, crp while on IV abx, regular ECG monitoring of QTc on high dose cipro --> follow up outpatient with ID prior to discontinuing antibiotics (needs appt end of July)  - blood cultures negative 6/19 and 6/20 (last + was 6/17), trend CRP q48h (way down since initiation of treatment and washout)  - intraop cultures are growing Staph, Pseudomonas, and C acnes    Acute Kidney Injury - multifactorial, see below  HALEY recurrent 6/29, repeat 500 ml bolus with no improvement. No evidence of hypovolemia. Nephrology consulted, appreciate recs.   - baseline creatinine 1.0 or 1.1  - etiology: suspect combo of sepsis/inflammation, hypovolemia, blood loss anemia following surgery, vancomycin?  - volume (hypovolemia/congestion): euvolemic but tough to assess based on habitus  - nephrotoxins: vancomycin  - diabetes: yes; last A1C < 6, though  - sepsis: resolved now  - anemia: yes, ongoing  - cardiac performance: normal based on KEILA from 6/19  - inflammation: from surgery and infection  - creatinine downtrending; neph ordered C3 and 4, normal    Bilateral LE edema due to volume overload from HALEY and iatrogenic from blood transfusions  - HOLDING diuretics as above    Hypoxemic Respiratory Failure due to immobility, surgery - resolved (sena)  - patient with fluctuating oxygen requirement but off O2 since 6/23  - continue to  follow; encourage IS    Acute blood loss anemia following surgery, complicated by use of blood thinner  - hgb 7.6 this morning; got 3 units day after surgery did joint washout (6/22) for acute blood loss anemia, 1 unit 6/25  - watch for bleeding; hold apixaban if need be - had unprovoked dvt in 2018 and would be on eliquis for life probably, at really high risk of recurrent DVT given recent procedures and illness/immobility  - iron and folic acid daily    S/p left hip explantation of left hip antibiotic spacer and revision of left total hip arthroplasty (5/26/2023)  - ortho performed washout 6/21; drain to come out in 2 weeks  -Activity: 50% PWB LLE with posterior hip precautions [id number]  -AFO ordered for foot drop but patient refusing to use  -PT/OT  -Knee to be kept even in a foam leg elevator to relax sciatic nerve  -Pain: Scheduled robaxin 4 times a day, APAP PRN                 -oxycodone  to 5-7.5 mg discontinued                 -tramadol 25-50 mg every 6 hours PRN    Hx of unprovoked DVT in 2018   - on eliquis 5 mg bid     Altered mental status due to Delirium from illness/hospitalization  resolved  - delirium precuations      Type 2 diabetes mellitus  - Last hemoglobin A1c 5.6% on 6/16/2023.  On glipizide, metformin, Actos, and Victoza prior to hospitalizations and surgery.   - continue ISS    Constipation  -Continue Colace 100 mg twice daily  -Continue senna 1 tablet twice daily  -Continue MiraLAX daily     Hypomagnesemia  -monitor and replete      Severe malnutrition in the context of acute on chronic illness  Goal for patient is to consume % of meals TID. Goal is 6143-6047 kcals/daily. Protein needs- 100-126 grams/daily.   -RD consult and appreciate recs  -regular diet  -Weekly weights, daily I/O     Adjustment disorder with depressed mood  Prolonged grief disorder  Patient has been with depressed mood in setting of ongoing health issues, as evidenced by lack of motivation to work with therapies  "during both TCU admissions.  Past history of passive suicidal statements. Psychiatry saw patient during hospital admission on 5/31 due to lack of motivation with therapies. Patient declined interest in starting any psychiatric medications. Was willing to engage with health psychology.   -Outpatient health psychology consult  -Continue to monitor for willingness to start antidepressant therapy     Stable and Resolved Issues:  Hyperlipidemia-continue PTA Lipitor 40 mg daily  JAIR-CPAP when sleeping       Diet: Snacks/Supplements Adult: Other; Chocholate or Butter Pecan Glucerna with bkf trays daily; With Meals  Regular Diet Adult  Room Service    DVT Prophylaxis: DOAC  Tran Catheter: Not present  Lines: PRESENT      CVC Single Lumen Right Internal jugular Non - valved (open ended);Tunneled;Power injectable-Site Assessment: WDL      Cardiac Monitoring: None  Code Status: Full Code      Clinically Significant Risk Factors              # Hypoalbuminemia: Lowest albumin = 2.2 g/dL at 6/26/2023  5:13 AM, will monitor as appropriate    # Acute Kidney Injury, unspecified: based on a >150% or 0.3 mg/dL increase in last creatinine compared to past 90 day average, will monitor renal function         # Obesity: Estimated body mass index is 39.86 kg/m  as calculated from the following:    Height as of this encounter: 1.778 m (5' 10\").    Weight as of this encounter: 126 kg (277 lb 12.5 oz).   # Severe Malnutrition: based on nutrition assessment         Disposition Plan     Expected Discharge Date: 07/05/2023        Discharge Comments: [facility]CU        Hector Burnham MD  Hospitalist Service, GOLD TEAM 21  [facility]  Securely message with [employer] (more info)  Text page via [email or web address] Paging/Directory   See signed in provider for up to date coverage information  ______________________________________________________________________    Interval History   No acute events overnight. " Confused this morning with some word finding difficulty. Typically improves in PM.  Notes ongoing pain in L leg.     Physical Exam   Vital Signs: Temp: 98.3  F (36.8  C) Temp src: Axillary BP: 133/59 Pulse: 74   Resp: 16 SpO2: 92 % O2 Device: None (Room air)    Weight: 277 lbs 12.47 oz    Constitutional: sitting in bed, relaxed  CV: regular rate and rhythm, no murmurs rubs or gallops  Resp: clear to auscultation bilaterally  Abd: soft, non-tender, non-distended, no hepatosplenomegaly or masses palpated  Neuro: alert and oriented x 3, speech clear      Medical Decision Making       50 MINUTES SPENT BY ME on the date of service doing chart review, history, exam, documentation & further activities per the note.      Data     I have personally reviewed the following data over the past 24 hrs:    12.0 (H)  \   7.5 (L)   / 353     138 106 41.7 (H) /  108 (H)   4.2 22 2.91 (H) \       ALT: 14 AST: 11 AP: 99 TBILI: 0.2   ALB: 2.6 (L) TOT PROTEIN: 5.9 (L) LIPASE: N/A       Imaging results reviewed over the past 24 hrs:   No results found for this or any previous visit (from the past 24 hour(s)).

## 2023-07-04 NOTE — PROGRESS NOTES
General ID Service [facility] [place]: Follow Up Note      Patient:  Waldo Bustos, Date of birth 1950, Medical record number [id number]  Date of Visit:  June 19, 2023         Assessment and Recommendations:   Problem List:  1. MRSA bacteremia, suspected secondary to left hip PJI and associated soft tissue infection; KEILA negative for IE   -6/14 blood cultures: 4 of 4 bottles positive  -6/15 blood cultures: 4 of 4 bottles positive  -6/17 blood cultures: 2 of 4 bottles positive  -6/19 NGTD  -6/20 NGTD  2. Chronic left hip PJI, s/p 2 stage revision with re-implantation on 5/26/23; I&D on 6/21/23 without liner exchanged  -GUERRERO drain in place growing MRSA, Corynebacterium, and Klebsiella (collected after drain was already established)  -6/21 intraop cultures: MRSA, C.acnes, and Pseudomonas  3. DM2  4. RA  5. Venous insufficiency  6. QTC = 421 -> 490 - > 492    Recommendations:    --HOLD Cefepime for now  --Start Ceftazidime 2 gram Q8h  -- Continue IV vancomycin (pharmacy to dose)    - Anticipate an initial 6 weeks of IV antibiotic therapy, followed by likely transition to oral antibiotics   - Given polymicrobial infection and multiple potential drug-drug interactions, will treat without adjunctive rifampin at this time   - Long-term antibiotic plan to be determined in outpatient ID follow up   - Weekly CBC w/diff, CMP, CRP, and vancomycin level while in IV therapy        Discussion:  Pt who was followed by ID team at  (last by Johnny Mercado and Shira on [facility]), transferred to [place] for KEILA, now back to [facility] on 6/20/23.      71yo M history of chronic left hip PJI, most recently s/p revision left BELLA on 5/26, who has been in TCU since discharge on 6/2, but then developed MRSA bacteremia with confusion and somnolence. CT hip without contrast showed soft tissue stranding and multiple foci of subcutaneous emphysema about the left hip, especially anteriorly, partially visualized fluid collection within  subcutaneous fat superficial-medial to the proximal adductor longus (caudal extent of which is incompletely visualized) which appears enlarged compared to outside CT 10/25/2022. Infection/abscess not excluded.  Blood cultures remained persistently positive for MRSA ( 6/14-6/17) but have been negative since 6/19/23. KEILA negative. Presumed hip infection and they obtained cultures from drain on 6/17/2023 - showing MRSA, Klebsiella, and Corynebacterium. Patient taken to OR for DAIR with Dr. Meyers on 6/21. OR report without any overt infection, but I did confirm with Dr. Meyers today that we should treat this as a prosthetic joint infection, which he agreed. Intraoperative cultures collected. Ceftriaxone added to vancomycin after I&D for coverage of Klebsiella (grown from drain). Intraop cultures without Klebsiella or Corynebacterium, but now showing Pseudomonas and Cutibacterium. Transitioned to cefepime, along with vancomycin to treat MRSA.    Given confusion today which he and hospitalist are worried about we can try to hold cefepime and start ceftazidime. My review of the literature shows some reduced odds of confusion with ceftazidime over cefepime. However, a little hard to compare. I think it is worth a try. Carbepenems are more likely to have confusion. Fluroquinolones are also possible if QTc is wnl but they also have some concern for confusion.       ID will continue to follow.    Gypsy Moon MD  Infectious Diseases   [phone]        Interval History:   Afebrile. Clinically stable, but patient does seem more confused today. He knew he was at the hospital but could not say which hospital. He knew it was July 4th.          Physical Exam:   Ranges for vital signs:  Temp:  [98.2  F (36.8  C)-98.3  F (36.8  C)] 98.3  F (36.8  C)  Pulse:  [74-75] 74  Resp:  [16] 16  BP: (133)/(59) 133/59  SpO2:  [92 %-98 %] 92 %    Intake/Output Summary (Last 24 hours) at 6/16/2023 1115  Last data filed at 6/15/2023  2239  Gross per 24 hour   Intake 50 ml   Output --   Net 50 ml     Exam:  GENERAL: Well-developed, well-nourished. Awake and alert. But seems confused. Slow speech.   ENT:  Head is normocephalic, atraumatic.   EYES:  Eyes have anicteric sclerae.    LUNGS:  Breathing comfortably  EXT: Extremities warm and without edema. Left hip with GUERRERO drain in place, serosanguinous fluid in line and collection vessel.  SKIN:  No acute rashes. Surgical site with no obvious erythema or purulence.  NEUROLOGIC:  Grossly nonfocal, though confused and somnolent         Laboratory Data:   Reviewed.  Pertinent for:    Microbiology:  Culture   Date Value Ref Range Status   06/21/2023 1+ Cutibacterium (Propionibacterium) acnes (A)  Final     Comment:     Susceptibility testing of Cutibacterium (Propionibacterium) species is not done from this source. This organism is susceptible to penicillin and cefotaxime and most are susceptible to clindamycin.   06/21/2023 No anaerobic organisms isolated  Final   06/21/2023 No anaerobic organisms isolated  Final   06/21/2023 No anaerobic organisms isolated  Final   06/21/2023 1+ Staphylococcus aureus (A)  Final     Comment:     Susceptibilities done on previous cultures   06/21/2023 1+ Pseudomonas aeruginosa (A)  Final   06/21/2023 1+ Staphylococcus aureus MRSA (A)  Final   06/21/2023 2+ Staphylococcus aureus (A)  Final     Comment:     Susceptibilities done on previous cultures   06/21/2023 1+ Pseudomonas aeruginosa (A)  Final     Comment:     Susceptibilities done on previous cultures   06/21/2023 1+ Staphylococcus aureus (A)  Final     Comment:     Susceptibilities done on previous cultures   06/21/2023 2+ Staphylococcus aureus (A)  Final     Comment:     Susceptibilities done on previous cultures   06/21/2023 No anaerobic organisms isolated  Final   06/21/2023 2+ Staphylococcus aureus MRSA (A)  Final   06/20/2023 No Growth  Final   06/20/2023 No Growth  Final   06/19/2023 No Growth  Final    06/19/2023 No Growth  Final   06/17/2023 4+ Staphylococcus aureus MRSA (A)  Final   06/17/2023 2+ Corynebacterium striatum (A)  Final     Comment:     Identification obtained by MALDI-TOF mass spectrometry research use only database. Test characteristics determined and verified by the Infectious Diseases Diagnostic Laboratory.   06/17/2023 1+ Klebsiella pneumoniae (A)  Final   06/17/2023 No anaerobic organisms isolated  Final   06/17/2023 Positive on the 1st day of incubation (A)  Final   06/17/2023 Staphylococcus aureus MRSA (AA)  Final     Comment:     1 of 2 bottlesSusceptibilities done on previous cultures   06/17/2023 Positive on the 1st day of incubation (A)  Final   06/17/2023 Staphylococcus aureus MRSA (AA)  Final     Comment:     1 of 2 bottlesSusceptibilities done on previous cultures   06/17/2023 <10,000 CFU/mL Urogenital marimar  Final   06/16/2023 Positive on the 1st day of incubation (A)  Final   06/16/2023 Staphylococcus aureus MRSA (AA)  Final     Comment:     1 of 2 bottlesSusceptibilities done on previous cultures   06/16/2023 Positive on the 1st day of incubation (A)  Final   06/16/2023 Staphylococcus aureus MRSA (AA)  Final     Comment:     1 of 2 bottlesSusceptibilities done on previous cultures   06/15/2023 Positive on the 1st day of incubation (A)  Final   06/15/2023 Staphylococcus aureus MRSA (AA)  Final     Comment:     2 of 2 bottlesSusceptibilities done on previous cultures   06/15/2023 Positive on the 1st day of incubation (A)  Final   06/15/2023 Staphylococcus aureus MRSA (AA)  Final     Comment:     2 of 2 bottlesSusceptibilities done on previous cultures   06/14/2023 Positive on the 1st day of incubation (A)  Final   06/14/2023 Staphylococcus aureus MRSA (AA)  Final     Comment:     2 of 2 bottlesSusceptibilities done on previous cultures   06/14/2023 Positive on the 1st day of incubation (A)  Final   06/14/2023 Staphylococcus aureus MRSA (AA)  Final     Comment:     2 of 2 bottles      05/26/2023 No anaerobic organisms isolated  Final   05/26/2023 No anaerobic organisms isolated  Final   05/26/2023 No anaerobic organisms isolated  Final   05/26/2023 No anaerobic organisms isolated  Final   05/26/2023 No Growth  Final   05/26/2023 No Growth  Final   05/26/2023 No Growth  Final   05/26/2023 No Growth  Final   05/26/2023 No anaerobic organisms isolated  Final   05/26/2023 No Growth  Final   04/07/2023 No anaerobic organisms isolated  Final   04/07/2023 No Growth  Final   04/07/2023 No anaerobic organisms isolated  Final   04/07/2023 No Growth  Final   04/07/2023 No anaerobic organisms isolated  Final   04/07/2023 No Growth  Final   04/07/2023 No anaerobic organisms isolated  Final   04/07/2023 No Growth  Final   04/07/2023 No anaerobic organisms isolated  Final   04/07/2023 No Growth  Final   04/07/2023 No anaerobic organisms isolated  Final   04/07/2023 No Growth  Final   11/22/2022 4+ Finegoldia magna (A)  Final     Comment:     Susceptibilities done on previous cultures   11/22/2022 No Growth  Final   11/22/2022 1+ Proteus mirabilis (A)  Final     Comment:     Susceptibilities done on previous cultures   11/22/2022 1+ Finegoldia magna (A)  Final     Comment:     Susceptibilities done on previous cultures   11/22/2022 No Growth  Final   11/22/2022 1+ Proteus mirabilis (A)  Final   11/22/2022 No anaerobic organisms isolated  Final   11/22/2022 No anaerobic organisms isolated  Final   11/22/2022 No anaerobic organisms isolated  Final   11/22/2022 No Growth  Final   11/22/2022 No Growth  Final   11/22/2022 No Growth  Final   11/22/2022 Isolated in broth only Proteus mirabilis (A)  Final     Comment:     On day 1 of incubationSusceptibilities done on previous cultures   11/22/2022 Isolated in broth only Proteus mirabilis (A)  Final     Comment:     On day 1 of incubationSusceptibilities done on previous cultures   11/22/2022 1+ Proteus mirabilis (A)  Final   11/22/2022 No anaerobic organisms isolated   Final   11/22/2022 No Growth  Final   11/22/2022 1+ Proteus mirabilis (A)  Final     Comment:     Susceptibilities done on previous cultures   11/14/2022 4+ Porphyromonas species (A)  Final     Comment:     Beta Lactamase Positive  Identification obtained by MALDI-TOF mass spectrometry research use only database. Test characteristics determined and verified by the Infectious Diseases Diagnostic Laboratory.   11/14/2022 1+ Finegoldia magna (A)  Corrected   11/14/2022 3+ Proteus mirabilis (A)  Final   11/14/2022 No Growth  Final     Metabolic Studies       Recent Labs   Lab Test 07/04/23  0627 07/04/23  0148 07/03/23  2210 07/03/23  1748 07/03/23  1346 07/03/23  1138 07/02/23  1754 07/02/23  1149 07/01/23  2208 07/01/23  2007 07/01/23  1106 07/01/23  1048 06/30/23  2208 06/30/23  2131 06/30/23  2039 06/30/23  1724 06/30/23  1315 06/30/23  0748 06/30/23  0743 06/29/23  0809 06/29/23  0720 06/28/23  0736 06/28/23  0658 06/26/23  0738 06/26/23  0513 06/16/23  0820 06/16/23  0605 03/21/23  0810 03/20/23  1919 01/10/23  1710 01/10/23  1105     --   --   --   --  140  --  137  --   --   --  138  --  136 136  --   --   --   --   --  140  --  138  --  139   < > 141   < >  --    < >  --    POTASSIUM 4.2  --   --   --   --  4.2  --  4.3  --   --   --  4.4  --   --   --   --  4.5  --  4.3  --  4.3  --  4.3  --  4.7   < > 4.2   < >  --    < >  --    CHLORIDE 106  --   --   --   --  108*  --  107  --   --   --  106  --   --   --   --   --   --   --   --  110*  --  109*  --  110*   < > 104   < >  --    < >  --    CO2 22  --   --   --   --  20*  --  21*  --   --   --  23  --   --   --   --   --   --   --   --  22  --  20*  --  20*   < > 24   < >  --    < >  --    ANIONGAP 10  --   --   --   --  12  --  9  --   --   --  9  --   --   --   --   --   --   --   --  8  --  9  --  9   < > 13   < >  --    < >  --    BUN 41.7*  --   --   --   --  39.0*  --  39.1*  --   --   --  38.5*  --   --   --   --   --   --   --   --  39.2*  --   39.8*  --  42.8*   < > 27.7*   < >  --    < >  --    CR 2.91*  --   --   --   --  2.51*  --  2.20*  --   --   --  2.18*  --  2.11* 2.11*  --   --   --  2.09*  --  1.98*  --  1.59*  1.59*  --  1.42*   < > 1.14   < >  --    < >  --    GFRESTIMATED 22*  --   --   --   --  27*  --  31*  --   --   --  32*  --   --   --   --   --   --  33*  --  35*  --  46*  46*  --  53*   < > 69   < >  --    < >  --    * 113* 139* 100* 111* 120*   < > 128*   < >  --    < > 151*   < >  --   --    < >  --    < >  --    < > 111*   < > 118*   < > 134*   < > 171*   < >  --    < >  --    A1C  --   --   --   --   --   --   --   --   --   --   --   --   --   --   --   --   --   --   --   --   --   --   --   --   --   --  5.6  --   --    < >  --    MAIKOL 8.0*  --   --   --   --  7.9*  --  7.9*  --   --   --  7.8*  --   --   --   --   --   --   --   --  7.7*  --  8.1*  --  7.6*   < > 8.3*   < >  --    < >  --    PHOS  --   --   --   --   --   --   --   --   --   --   --  3.7  --   --   --   --   --   --   --   --   --   --   --   --  3.7   < >  --   --   --   --   --    MAG  --   --   --   --   --   --   --  1.7  --  1.5*  --  1.4*  --   --   --   --   --   --   --   --   --   --   --   --   --    < > 1.7   < >  --   --   --    LACT  --   --   --   --   --   --   --   --   --   --   --   --   --   --   --   --   --   --   --   --   --   --   --   --   --   --   --   --  1.3  --  0.9    < > = values in this interval not displayed.     Hepatic Studies    Recent Labs   Lab Test 07/04/23  0627 06/26/23  0513 06/25/23  0615 06/24/23  0654 06/23/23  0840 06/22/23  1341 06/22/23  0711 06/20/23  0704 06/19/23  0915 06/16/23  0605 06/14/23  0651 05/31/23  0630   BILITOTAL 0.2  --   --   --   --   --   --  0.2 0.2 0.2 0.2 0.2   ALKPHOS 99  --   --   --   --   --   --  93 94 114 110 74   ALBUMIN 2.6* 2.2* 2.3* 2.4* 2.4* 2.4*   < > 2.3* 2.6* 2.8* 2.8* 3.2*   AST 11  --   --   --   --   --   --  14 19 16 16 14   ALT 14  --   --   --   --   --   --  14  12 10 8 6*    < > = values in this interval not displayed.     Hematology Studies      Recent Labs   Lab Test 07/04/23  0627 07/03/23  1138 07/01/23  1048 06/26/23  0513 06/25/23  0615 06/24/23  0654 06/23/23  0840   WBC 12.0* 11.6* 11.7*  --  13.9* 14.9* 14.0*   HGB 7.5* 7.5* 7.6* 7.6* 6.8* 7.3* 7.5*   HCT 23.9* 24.3* 24.4*  --  22.1* 23.0* 23.6*    403 362  --  310 290 312     Arterial Blood Gas Testing    Recent Labs   Lab Test 07/03/23  1138 06/15/23  1325 11/26/22  0945 11/22/22  1925   PH  --  7.39  --   --    PCO2  --  44  --   --    PO2  --  83  --   --    HCO3  --  26  --   --    O2PER 22 2 32 40.0          Latest Ref Rng & Units 7/4/2023     6:27 AM 7/3/2023    11:38 AM 7/2/2023    11:49 AM 7/1/2023    10:48 AM 6/30/2023     9:31 PM   Transplant Immunosuppression Labs   Creat 0.67 - 1.17 mg/dL 2.91  2.51  2.20  2.18  2.11    Urea Nitrogen 8.0 - 23.0 mg/dL 41.7  39.0  39.1  38.5     WBC 4.0 - 11.0 10e3/uL 12.0  11.6   11.7     Neutrophil %    67            Imaging:   XR Chest Port 1 View  Result Date: 6/14/2023  IMPRESSION: Negative portable view. If further detail is needed comment consider upright PA and lateral examination when clinical condition permits. TAE MORRIS MD   SYSTEM ID:  [id number]    XR Abdomen 1 View  Result Date: 6/10/2023  IMPRESSION: Nonobstructive bowel gas pattern. Large stool burden. I have personally reviewed the examination and initial interpretation and I agree with the findings. JONNATHAN COLEMAN MD   SYSTEM ID:  [id number]

## 2023-07-05 NOTE — PHARMACY-VANCOMYCIN DOSING SERVICE
Pharmacy Vancomycin Note  Date of Service 2023  Patient's  1950   72 year old, male    Indication: MRSA bacteremia, vancomycin MARCOS </= 0.5  Day of Therapy: Started on 6/15  Current vancomycin regimen:  Intermittent dosing (last received 1500 mg IV once on )  Current vancomycin monitoring method: Trough (Method 2 = manual dose calculation)  Current vancomycin therapeutic monitoring goal: 15-20 mg/L    Current estimated CrCl = Estimated Creatinine Clearance: 31 mL/min (A) (based on SCr of 2.91 mg/dL (H)).    Creatinine for last 3 days  2023: 11:49 AM Creatinine 2.20 mg/dL  7/3/2023: 11:38 AM Creatinine 2.51 mg/dL  2023:  6:27 AM Creatinine 2.91 mg/dL    Recent Vancomycin Levels (past 3 days)  2023: 11:49 AM Vancomycin 23.1 ug/mL  2023: 12:37 PM Vancomycin 25.5 ug/mL  2023:  7:00 AM Vancomycin 25.7 ug/mL    Vancomycin IV Administrations (past 72 hours)                   vancomycin (VANCOCIN) 1,500 mg in 0.9% NaCl 250 mL intermittent infusion (mg) 1,500 mg New Bag 23 2249                Nephrotoxins and other renal medications (From now, onward)    Start     Dose/Rate Route Frequency Ordered Stop    23 0800  [Held by provider]  furosemide (LASIX) tablet 60 mg        (Held by provider since 2023 at 0943 by Hector Burnham MD.Hold Reason: Acute Kidney Injury)    60 mg Oral DAILY 23 1538      23 0813  vancomycin place montilla - receiving intermittent dosing         1 each Intravenous SEE ADMIN INSTRUCTIONS 23 0813               Contrast Orders - past 72 hours (72h ago, onward)    None          Interpretation of levels and current regimen:  Vancomycin level is reflective of supratherapeutic level    Has serum creatinine changed greater than 50% in last 72 hours: No    Urine output:  unable to determine    Renal Function: Worsening    Plan:  1. No dose today, will recheck a trough level tomorrow with am labs  2. Vancomycin monitoring method:  Trough (Method 2 = manual dose calculation)  3. Vancomycin therapeutic monitoring goal: 15-20 mg/L  4. Pharmacy will check vancomycin levels as appropriate in 1-3 Days.  5. Serum creatinine levels will be ordered daily for the first week of therapy and at least twice weekly for subsequent weeks.    Ivan Garcia, PharmD, BCPS

## 2023-07-05 NOTE — PLAN OF CARE
"Goal Outcome Evaluation:    Patient A&O to self only. Increased confusion and agitation apparent, patient continues to refuse medications and other cares/services. When re-approached after refusals pt becomes increasingly agitated. Patient is also refusing all meals and declines to order meal at all. When staff order meal for client he refuses to eat. EKG ordered, results show abnormalities, Provider notified. Pt had a episode of incontinence of bowel, while assisting patient with caridad-care, skin surrounding groin area red and excoriated. After thoroughly washing with soap and water, area was dried then powder was applied. Patient continued to refuse medication admin and other services for rest of this shift. Provider notified of patients condition and refusals, continue with POC.      Patient's most recent vitals:    /48 (BP Location: Left arm)   Pulse 84   Temp 97.6  F (36.4  C) (Axillary)   Resp 16   Ht 1.778 m (5' 10\")   Wt 126 kg (277 lb 12.5 oz)   SpO2 95%   BMI 39.86 kg/m      "

## 2023-07-05 NOTE — PROGRESS NOTES
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  Postop course complicated by profound deconditioning.  He was admitted to TCU initially on 12/23/2022 for physical rehabilitation, however therapies no longer worked with patient after several months of an in bed therapy and his refusals to mobilize.  Underwent biopsy on 4/7/2023 without evidence of infection.  He was transferred back to [facility] for left total hip arthroplasty and reimplantation on 5/26/23 with Dr. Meyers.  Presented again to [place] for persistent MRSA bacteremia on 6/17.     Brief Hospital Summary:    Pt was admitted to continue infectious workup with MRSA bacteremia.  Admission blood cultures were positive, ID and Ortho were consulted. KEILA was obtained on 6/19, which was negative for endocarditis, ortho took for washout/debridement of hip on 6/21. ID recommending 6 weeks abx (6/21-8/1), patient recovering from HALEY, but given history of HALEY's and need for long term vanc plus comorbids making declining kidney function in the future probable nephrology doesn't feel comfortable with patient getting a PICC -- Tunneled internal jugular line placed. Transitioned cefepime to cipro back to cefepime given HALEY and prolonged qtc, continue vancomycin.     Plan for today:  - discussed with nephrology, ongoing worsening renal function, now with tremors. Concern for possible worsening uremia.  RRT being considered. Hold diuresis  - off Cefepime since 7/4 in case that is contributing to mental  status changes.  Ceftazidime and vanc to continue.   - low dose (2.5 mg) oxy with position changes/therapy    Altered mental status due to Delirium from illness/hospitalization and possible toxic metabolic encephalopathy  - medications reduced as able, off cefepime since 7/4  - ? Uremia in setting of worsening renal failure and new myoclonus.  Nephrology following  - CT head, ammonia, vbg reassuring.      MRSA bacteremia due to possible left hip septic arthritis vs other source Blood cultures: 6/14, 6/15 + for MRSA (4/4), and 6/16 (1/2 GPCs) and 6/17 (2/2) GPCs. - started vancomycin 6/15.   6/17 sinovial fluid cultures with staph and klebsiella. Purulent drainage noted at incision site at TCU, CT left hip concerning for infection (no mention of joint, mostly anterior in soft tissue) and couldn't exclude abscess/inf  - Continue vancomycin, added rocephin 6/21 and changed to cefepime 6/23, ceftazidime 7/4; ID recommends 6 weeks IV vanc (last day 8/1/23) and can transition to oral cipro 750 mg BID at discharge, needs weekly vanc level, cbcd, cmp, crp while on IV abx, regular ECG monitoring of QTc on high dose cipro --> follow up outpatient with ID prior to discontinuing antibiotics (needs appt end of July)  - blood cultures negative 6/19 and 6/20 (last + was 6/17), trend CRP q48h (way down since initiation of treatment and washout)  - intraop cultures with Staph, Pseudomonas, and C acnes    Acute Kidney Injury - multifactorial, see below  HALEY recurrent 6/29, repeat 500 ml bolus with no improvement. No evidence of hypovolemia. Nephrology consulted, appreciate recs.   - baseline creatinine 1.0 or 1.1  - etiology: suspect combo of sepsis/inflammation, hypovolemia, blood loss anemia following surgery, vancomycin  - volume (hypovolemia/congestion): euvolemic but tough to assess based on habitus  - nephrotoxins: vancomycin  - diabetes: yes; last A1C < 6, though  - sepsis: resolved now  - anemia: yes, ongoing  - cardiac  performance: normal based on KEILA from 6/19  - inflammation: from surgery and infection  - creatinine downtrending; neph ordered C3 and 4, normal    Bilateral LE edema due to volume overload from HALEY and iatrogenic from blood transfusions  - HOLDING diuretics as above    Hypoxemic Respiratory Failure due to immobility, surgery - resolved (sena)  - patient with fluctuating oxygen requirement but off O2 since 6/23  - continue to follow; encourage IS    Acute blood loss anemia following surgery, complicated by use of blood thinner  - hgb 7.6 this morning; got 3 units day after surgery did joint washout (6/22) for acute blood loss anemia, 1 unit 6/25  - watch for bleeding; hold apixaban if need be - had unprovoked dvt in 2018 and would be on eliquis for life probably, at really high risk of recurrent DVT given recent procedures and illness/immobility  - iron and folic acid daily    S/p left hip explantation of left hip antibiotic spacer and revision of left total hip arthroplasty (5/26/2023)  - ortho performed washout 6/21; drain to come out in 2 weeks  -Activity: 50% PWB LLE with posterior hip precautions [id number]  -AFO ordered for foot drop but patient refusing to use  -PT/OT  -Knee to be kept even in a foam leg elevator to relax sciatic nerve  -Pain: holding robaxin 4 times a day with MS changes, APAP PRN                 -oxycodone  to 2.5 mg                  -tramadol 25-50 mg every 6 hours PRN    Hx of unprovoked DVT in 2018   - on eliquis 5 mg bid     Type 2 diabetes mellitus  - Last hemoglobin A1c 5.6% on 6/16/2023.  On glipizide, metformin, Actos, and Victoza prior to hospitalizations and surgery.   - continue ISS    Constipation  -Continue Colace 100 mg twice daily  -Continue senna 1 tablet twice daily  -Continue MiraLAX daily     Hypomagnesemia  -monitor and replete      Severe malnutrition in the context of acute on chronic illness  Goal for patient is to consume % of meals TID. Goal is 0838-7041  "kcals/daily. Protein needs- 100-126 grams/daily.   -RD consult and appreciate recs  -regular diet  -Weekly weights, daily I/O     Adjustment disorder with depressed mood  Prolonged grief disorder  Patient has been with depressed mood in setting of ongoing health issues, as evidenced by lack of motivation to work with therapies during both TCU admissions.  Past history of passive suicidal statements. Psychiatry saw patient during hospital admission on 5/31 due to lack of motivation with therapies. Patient declined interest in starting any psychiatric medications. Was willing to engage with health psychology.   -Outpatient health psychology consult  -Continue to monitor for willingness to start antidepressant therapy     Stable and Resolved Issues:  Hyperlipidemia-continue PTA Lipitor 40 mg daily  JAIR-CPAP when sleeping       Diet: Snacks/Supplements Adult: Other; Chocholate or Butter Pecan Glucerna with bkf trays daily; With Meals  Regular Diet Adult  Room Service    DVT Prophylaxis: DOAC  Tran Catheter: Not present  Lines: PRESENT      CVC Single Lumen Right Internal jugular Non - valved (open ended);Tunneled;Power injectable-Site Assessment: WDL      Cardiac Monitoring: None  Code Status: Full Code      Clinically Significant Risk Factors              # Hypoalbuminemia: Lowest albumin = 2.2 g/dL at 6/26/2023  5:13 AM, will monitor as appropriate    # Acute Kidney Injury, unspecified: based on a >150% or 0.3 mg/dL increase in last creatinine compared to past 90 day average, will monitor renal function         # Obesity: Estimated body mass index is 39.86 kg/m  as calculated from the following:    Height as of this encounter: 1.778 m (5' 10\").    Weight as of this encounter: 126 kg (277 lb 12.5 oz).   # Severe Malnutrition: based on nutrition assessment         Disposition Plan      Expected Discharge Date: 07/07/2023        Discharge Comments: [facility]        Hector Brunham MD  Hospitalist Service, GOLD TEAM " 21  [facility]  Securely message with [employer] (more info)  Text page via [employer] Paging/Directory   See signed in provider for up to date coverage information  ______________________________________________________________________    Interval History   Worsening confusion, wordfinding difficulty, and refusal of meds/food over last 24 hours. Reports no shortness of breath this morning, but is having pain in leg.  Unable to participate further.     Physical Exam   Vital Signs: Temp: (!) 96.5  F (35.8  C) Temp src: Oral BP: (!) 160/67 Pulse: 81   Resp: 16 SpO2: 96 % O2 Device: None (Room air)    Weight: 277 lbs 12.47 oz    Constitutional: sitting in bed,  CV: regular rate and rhythm, no murmurs rubs or gallops  Resp: rare crackles to auscultation bilaterally  Abd: soft, non-tender, non-distended, no hepatosplenomegaly or masses palpated, hernia reducible   Neuro: alert and oriented x 1, some word finding difficulty. CN grossly intact, WEEKS aside from RLE, but does endorse intact sensation    Medical Decision Making       50 MINUTES SPENT BY ME on the date of service doing chart review, history, exam, documentation & further activities per the note.      Data     I have personally reviewed the following data over the past 24 hrs:    9.9  \   7.2 (L)   / 318     141 109 (H) 45.9 (H) /  90   4.3 19 (L) 3.36 (H) \       Procal: N/A CRP: N/A Lactic Acid: N/A         Imaging results reviewed over the past 24 hrs:   Recent Results (from the past 24 hour(s))   CT Head w/o Contrast    Narrative    CT HEAD W/O CONTRAST 7/4/2023 8:42 PM    Provided History: neuro changes    Comparison: None.    Technique: Using multidetector thin collimation helical acquisition  technique, axial, coronal and sagittal CT images from the skull base  to the vertex were obtained without intravenous contrast.     Findings:    No intracranial hemorrhage. No mass effect. No midline shift. No  extra-axial fluid  collection. The gray to white matter differentiation  of the cerebral hemispheres is preserved. Ventricles are proportionate  to the sulci. Moderate cerebral atrophy. No sulcal effacement. The  basal cisterns are patent. Periventricular white matter hypodensities  are nonspecific but likely represent chronic small vessel ischemic  disease.    Mild scattered mucosal thickening in the paranasal sinuses. The  mastoid air cells are clear. Orbits appear unremarkable. No acute  fracture.      Impression    Impression:   1. No acute intracranial pathology.  2. Periventricular white matter hypodensities which are nonspecific,  but likely representing chronic small vessel ischemic disease.  3. Moderate cerebral atrophy.    I have personally reviewed the examination and initial interpretation  and I agree with the findings.    JOSE COMER MD         SYSTEM ID:  [id number]

## 2023-07-05 NOTE — PLAN OF CARE
"VS: /50   Pulse 82   Temp 97.3  F (36.3  C) (Oral)   Resp 16   Ht 1.778 m (5' 10\")   Wt 126 kg (277 lb 12.5 oz)   SpO2 94%   BMI 39.86 kg/m      O2: O2 SATS >90% denies chest pain,  or SBO   Output: Voids adequately using urinal   Last BM: 7/4/23 BS present all x4 quadrants    Activity: Not OOB this shift AOx3  with cares  refused repositioning   attempts made but pt refused    Up for meals? N/A   Skin: L hip surgical incision Redness/blanchable to bilateral shins. Abrasion/wound to buttocks. Refused powder or barrier cream   Wound to L heel    Pain: Pain managed with prn ,Tramadol   CMS: Alert to self  slow to respond to questions   Dressing: CDI    Diet: Regular diet diabetic with BG checks and sliding scale    LDA: R chest CVC - SL   Equipment: IV Pole,  celling lift personal belongings   Plan: Continue with plan of care    Additional Info: Pt declining and refusing to eat  blood work done and start CT ,  done no evidence of stroke per provider ,  continue to monitor and notify if pt continues to decline.                           "

## 2023-07-05 NOTE — PROGRESS NOTES
"Orthopedic Surgery Progress Note: 07/05/2023    Subjective:   Stat CT head overnight for concerns regarding word finding difficulty and confusion. Negative for acute abnormalities. Seems consistent with delirium. AVSS. Little movement. Not eating much. Deferring cares including repositioning. Left leg still very sensitive. Awaiting TCU. Pizza box if family visited.    Objective:   /50   Pulse 82   Temp 97.3  F (36.3  C) (Oral)   Resp 16   Ht 1.778 m (5' 10\")   Wt 126 kg (277 lb 12.5 oz)   SpO2 94%   BMI 39.86 kg/m    No intake/output data recorded.  General: NAD. Resting comfortably in bed.  Respiratory: Breathing comfortably on RA.  Musculoskeletal: Focused exam of the left lower extremity: Dressings c/d/i.  Fires GSC, TA, FHL, and EHL minimally. Incredibly sensitive to touch on feet. Overall decreased sensation distally. Brisk capillary refill.     Laboratory Data:  Lab Results   Component Value Date    WBC 12.0 (H) 07/04/2023    HGB 7.5 (L) 07/04/2023     07/04/2023      Intraoperative cultures   6/21/2023: MRSA, C Acnes, Pseudomonas    Assessment & Plan:   Waldo Bustos is a 72 year old male status post left revision total hip arthroplasty on 5/26/2023 with Dr. Meyers now presenting with left hip prosthetic joint infection status post left hip irrigation and debridement on 6/21/2023 with Dr. Meyers.    CRP downtrending. C acnes, MRSA in cultures. Treating with vanc/cefepime, per ID recs. Appreciate assistance.    Ortho will continue to follow every few days. Please do not hesitate to page with questions/concerns.    Goals for Today:  - Pain control  - Mobilization - appreciate therapy recs  - Discharge planning    Ortho Plan:  - Activity: Up with assist until independent. Posterior hip precautions x 3 months (no flexion beyond 90 degrees, no adduction beyond midline, and no internal rotation beyond midline).  - Weightbearing Status: WBAT LLE.  - Pain Management: Transition from IV to PO as " tolerated.  - Antibiotics: Vancomycin and cefepime, per ID.  - Diet: OK for a diet from an orthopedic perspective.  - DVT Prophylaxis: SCDs and PTA apixaban 5 mg BID.  - Imaging: None pending.  - Labs: Labs PRN and per primary  - Bracing/Splinting: Abduction pillow while in bed.  - Dressings: Dressing changed 7/3.  - Drains: GUERRERO drain removed 7/3  - Tran Catheter: None.   - Physical Therapy/Occupational Therapy: Evaluation and treatment.  - Cultures: Intraoperative cultures 6/21/2023.  - Follow-up: Clinic with Dr. Meyers's team TBD (pending dispo plan). Will plan to get XR at that time.    - Disposition: TCU. Okay to discharge from an ortho perspective.     Hannah Starr MD, PGY-4  Orthopedics  Pager: [phone]

## 2023-07-05 NOTE — PROGRESS NOTES
Nephrology Progress Note  07/05/2023         Assessment & Recommendations:   A/Rec: 73yo M with persistent prosthetic joint infection of L hip admitted for MRSA bacteremia with recurrent HALEY.     #HALEY - Cr improved to 1.4 on 6/26 but has since risen back to 3.3 likely secondary to hemodynamic instability and overdiuresis. Continued exposure to vanc could be contributing.   -off furosemide  -VBG yesterday bicarb 21, pCO2 33, decreased from 37  --No indications for RRT yet, will continue to monitor closely       #Volume overload - resolved  -  mL yesterday, down from 975 ml previous day  -management as per above    #Electrolytes  - Na 138, K 4.2 no acute issues  -CO2 22 relatively high in the setting of severe renal failure signaling likely underlying chloride depletion alkalosis    Recommendations were communicated to primary team via this note and verbally       OPAL MAYER, DIANA     Interval History :   Reviewed provider and nursing notes for past 24 hours. Had CT head overnight due to increasing confusion - negative acute. Discharge pending TCU placement. Cefepime stopped yesterday, Vanc level 25. Has developed myoclonus. Spoke with primary team, unclear if this is delirium related to possible cefepime toxicity vs uremia. Pt responds to voice but drowsy.       Review of Systems:   Unable to obtain.     Physical Exam:   I/O last 3 completed shifts:  In: 30 [I.V.:30]  Out: 450 [Urine:450]  Vitals: /67  Pulse 81  Resp 16  SpO2 96%  Temp 96.5  GENERAL APPEARANCE: drowsy, no distress  EYES:  no scleral icterus, pupils equal  PULM: lungs clear to auscultation, no crackles   CV: regular rhythm, normal rate  MS: no evidence of inflammation in joints, no muscle tenderness  NEURO: confused, myoclonus      Labs:   All labs reviewed by me  Electrolytes/Renal -   Recent Labs   Lab Test 07/05/23  0823 07/05/23  0700 07/05/23  0155 07/04/23  2324 07/04/23  1230 07/04/23  0627 07/03/23  1346 07/03/23  1138  07/02/23  1754 07/02/23  1149 07/01/23  2208 07/01/23 2007 07/01/23  1106 07/01/23  1048 06/26/23  0738 06/26/23  0513 06/25/23  0812 06/25/23  0615   NA  --   --   --   --   --  138  --  140  --  137  --   --   --  138   < > 139  --  139   POTASSIUM  --   --   --   --   --  4.2  --  4.2  --  4.3  --   --   --  4.4   < > 4.7  --  4.6   CHLORIDE  --   --   --   --   --  106  --  108*  --  107  --   --   --  106   < > 110*  --  108*   CO2  --   --   --   --   --  22  --  20*  --  21*  --   --   --  23   < > 20*  --  22   BUN  --   --   --   --   --  41.7*  --  39.0*  --  39.1*  --   --   --  38.5*   < > 42.8*  --  46.0*   CR  --   --   --   --   --  2.91*  --  2.51*  --  2.20*  --   --   --  2.18*   < > 1.42*  --  1.65*   GLC 93  --  118* 109*   < > 108*   < > 120*   < > 128*   < >  --    < > 151*   < > 134*   < > 142*   MAIKOL  --   --   --   --   --  8.0*  --  7.9*  --  7.9*  --   --   --  7.8*   < > 7.6*  --  7.5*   MAG  --  1.7  --   --   --   --   --   --   --  1.7  --  1.5*  --  1.4*  --   --   --  1.8   PHOS  --   --   --   --   --   --   --   --   --   --   --   --   --  3.7  --  3.7  --  3.9    < > = values in this interval not displayed.       CBC -   Recent Labs   Lab Test 07/04/23  0627 07/03/23  1138 07/01/23  1048   WBC 12.0* 11.6* 11.7*   HGB 7.5* 7.5* 7.6*    403 362       LFTs -   Recent Labs   Lab Test 07/04/23  0627 06/26/23  0513 06/25/23  0615 06/22/23  0711 06/20/23  0704 06/19/23  0915   ALKPHOS 99  --   --   --  93 94   BILITOTAL 0.2  --   --   --  0.2 0.2   ALT 14  --   --   --  14 12   AST 11  --   --   --  14 19   PROTTOTAL 5.9*  --   --   --  5.9* 6.2*   ALBUMIN 2.6* 2.2* 2.3*   < > 2.3* 2.6*    < > = values in this interval not displayed.       Iron Panel -   Recent Labs   Lab Test 06/26/23 0513   IRON 30*   IRONSAT 21   FADI 2,616*         Imaging:  Reviewed     Current Medications:    apixaban ANTICOAGULANT  5 mg Oral BID     atorvastatin  40 mg Oral Daily     cefTAZidime  2 g  Intravenous Q24H     ferrous sulfate  325 mg Oral Every Other Day     folic acid  1 mg Oral Daily     [Held by provider] furosemide  60 mg Oral Daily     heparin lock flush  5-20 mL Intracatheter Q24H     insulin aspart  1-7 Units Subcutaneous TID AC     insulin aspart  1-5 Units Subcutaneous At Bedtime     lactobacillus rhamnosus (GG)  1 capsule Oral BID     [Held by provider] methocarbamol  500 mg Oral 4x Daily     miconazole   Topical BID     multivitamin w/minerals  1 tablet Oral Daily     polyethylene glycol  17 g Oral BID     senna-docusate  1 tablet Oral BID     sodium chloride (PF)  10 mL Intracatheter Q8H     sodium chloride (PF)  10-40 mL Intracatheter Q8H     vancomycin place montilla - receiving intermittent dosing  1 each Intravenous See Admin Instructions       LENIN BLANKENSHIPC

## 2023-07-05 NOTE — PROGRESS NOTES
General ID Service [facility] [place]: Follow Up Note      Patient:  Waldo Bustos, Date of birth 1950, Medical record number [id number]  Date of Visit:  June 19, 2023         Assessment and Recommendations:   Problem List:  1. MRSA bacteremia, suspected secondary to left hip PJI and associated soft tissue infection; KEILA negative for IE   -6/14 blood cultures: 4 of 4 bottles positive  -6/15 blood cultures: 4 of 4 bottles positive  -6/17 blood cultures: 2 of 4 bottles positive  -6/19 NGTD  -6/20 NGTD  2. Chronic left hip PJI, s/p 2 stage revision with re-implantation on 5/26/23; I&D on 6/21/23 without liner exchanged  -GUERRERO drain in place growing MRSA, Corynebacterium, and Klebsiella (collected after drain was already established)  -6/21 intraop cultures: MRSA, C.acnes, and Pseudomonas  3. DM2  4. RA  5. Venous insufficiency  6. QTC = 421 -> 490 - > 492, I ordered a repeat    Recommendations:    --HOLD Cefepime for now  --Continue Ceftazidime 2 gram Q8h  --if he remains so delirious, I would consider changing classes of antibiotics an case it is contributing  -- Continue IV vancomycin (pharmacy to dose)    - Anticipate an initial 6 weeks of IV antibiotic therapy, followed by likely transition to oral antibiotics   - Given polymicrobial infection and multiple potential drug-drug interactions, will treat without adjunctive rifampin at this time   - Long-term antibiotic plan to be determined in outpatient ID follow up   - Weekly CBC w/diff, CMP, CRP, and vancomycin level while in IV therapy        Discussion:  Pt who was followed by ID team at  (last by Johnny Mercado and Shira on [facility]), transferred to [place] for KEILA, now back to [facility] on 6/20/23.      73yo M history of chronic left hip PJI, most recently s/p revision left BELLA on 5/26, who has been in TCU since discharge on 6/2, but then developed MRSA bacteremia with confusion and somnolence. CT hip without contrast showed soft tissue stranding and  multiple foci of subcutaneous emphysema about the left hip, especially anteriorly, partially visualized fluid collection within subcutaneous fat superficial-medial to the proximal adductor longus (caudal extent of which is incompletely visualized) which appears enlarged compared to outside CT 10/25/2022. Infection/abscess not excluded.  Blood cultures remained persistently positive for MRSA ( 6/14-6/17) but have been negative since 6/19/23. KEILA negative. Presumed hip infection and they obtained cultures from drain on 6/17/2023 - showing MRSA, Klebsiella, and Corynebacterium. Patient taken to OR for DAIR with Dr. Meyers on 6/21. OR report without any overt infection, but I did confirm with Dr. Meyers today that we should treat this as a prosthetic joint infection, which he agreed. Intraoperative cultures collected. Ceftriaxone added to vancomycin after I&D for coverage of Klebsiella (grown from drain). Intraop cultures without Klebsiella or Corynebacterium, but now showing Pseudomonas and Cutibacterium. Transitioned to cefepime, along with vancomycin to treat MRSA.    Given confusion 7/4 which he and hospitalist are worried about we can try to hold cefepime and start ceftazidime. My review of the literature shows some reduced odds of confusion with ceftazidime over cefepime. However, a little hard to compare. I think it is worth a try. Carbepenems are more likely to have confusion. Fluroquinolones are also possible if QTc is wnl but they also have some concern for confusion. I will continue ceftazidime today and get an EKG. If the QTc is improved we can consider using a flouroquinolone.       ID will continue to follow.    My colleague Dr. Pat Mercer will take over the service on 7/6-7/7    Gypsy Moon MD  Infectious Diseases   [phone]        Interval History:   Afebrile. Patient remains very lethargic and confused. He was more tired appearing and less interactive than yesterday. He had not had any of his  food from his tray. He said he did know where he was when asked but did not respond. He then wanted to use the urinal.          Physical Exam:   Ranges for vital signs:  Temp:  [96.5  F (35.8  C)-98.8  F (37.1  C)] 96.5  F (35.8  C)  Pulse:  [81-91] 81  Resp:  [16] 16  BP: (119-160)/(40-67) 160/67  SpO2:  [94 %-96 %] 96 %    Intake/Output Summary (Last 24 hours) at 6/16/2023 1115  Last data filed at 6/15/2023 2239  Gross per 24 hour   Intake 50 ml   Output --   Net 50 ml     Exam:  GENERAL: Well-developed, well-nourished. Awake and alert. But seems confused. Slow speech.   ENT:  Head is normocephalic, atraumatic.   EYES:  Eyes have anicteric sclerae.    LUNGS:  Breathing comfortably  EXT: Extremities warm and without edema. Left hip with GUERRERO drain in place, serosanguinous fluid in line and collection vessel.  SKIN:  No acute rashes. Surgical site with no obvious erythema or purulence.  NEUROLOGIC:  Grossly nonfocal, though confused and somnolent         Laboratory Data:   Reviewed.  Pertinent for:    Microbiology:  Culture   Date Value Ref Range Status   06/21/2023 1+ Cutibacterium (Propionibacterium) acnes (A)  Final     Comment:     Susceptibility testing of Cutibacterium (Propionibacterium) species is not done from this source. This organism is susceptible to penicillin and cefotaxime and most are susceptible to clindamycin.   06/21/2023 No anaerobic organisms isolated  Final   06/21/2023 No anaerobic organisms isolated  Final   06/21/2023 No anaerobic organisms isolated  Final   06/21/2023 1+ Staphylococcus aureus (A)  Final     Comment:     Susceptibilities done on previous cultures   06/21/2023 1+ Pseudomonas aeruginosa (A)  Final   06/21/2023 1+ Staphylococcus aureus MRSA (A)  Final   06/21/2023 2+ Staphylococcus aureus (A)  Final     Comment:     Susceptibilities done on previous cultures   06/21/2023 1+ Pseudomonas aeruginosa (A)  Final     Comment:     Susceptibilities done on previous cultures   06/21/2023  1+ Staphylococcus aureus (A)  Final     Comment:     Susceptibilities done on previous cultures   06/21/2023 2+ Staphylococcus aureus (A)  Final     Comment:     Susceptibilities done on previous cultures   06/21/2023 No anaerobic organisms isolated  Final   06/21/2023 2+ Staphylococcus aureus MRSA (A)  Final   06/20/2023 No Growth  Final   06/20/2023 No Growth  Final   06/19/2023 No Growth  Final   06/19/2023 No Growth  Final   06/17/2023 4+ Staphylococcus aureus MRSA (A)  Final   06/17/2023 2+ Corynebacterium striatum (A)  Final     Comment:     Identification obtained by MALDI-TOF mass spectrometry research use only database. Test characteristics determined and verified by the Infectious Diseases Diagnostic Laboratory.   06/17/2023 1+ Klebsiella pneumoniae (A)  Final   06/17/2023 No anaerobic organisms isolated  Final   06/17/2023 Positive on the 1st day of incubation (A)  Final   06/17/2023 Staphylococcus aureus MRSA (AA)  Final     Comment:     1 of 2 bottlesSusceptibilities done on previous cultures   06/17/2023 Positive on the 1st day of incubation (A)  Final   06/17/2023 Staphylococcus aureus MRSA (AA)  Final     Comment:     1 of 2 bottlesSusceptibilities done on previous cultures   06/17/2023 <10,000 CFU/mL Urogenital marimar  Final   06/16/2023 Positive on the 1st day of incubation (A)  Final   06/16/2023 Staphylococcus aureus MRSA (AA)  Final     Comment:     1 of 2 bottlesSusceptibilities done on previous cultures   06/16/2023 Positive on the 1st day of incubation (A)  Final   06/16/2023 Staphylococcus aureus MRSA (AA)  Final     Comment:     1 of 2 bottlesSusceptibilities done on previous cultures   06/15/2023 Positive on the 1st day of incubation (A)  Final   06/15/2023 Staphylococcus aureus MRSA (AA)  Final     Comment:     2 of 2 bottlesSusceptibilities done on previous cultures   06/15/2023 Positive on the 1st day of incubation (A)  Final   06/15/2023 Staphylococcus aureus MRSA (AA)  Final      Comment:     2 of 2 bottlesSusceptibilities done on previous cultures   06/14/2023 Positive on the 1st day of incubation (A)  Final   06/14/2023 Staphylococcus aureus MRSA (AA)  Final     Comment:     2 of 2 bottlesSusceptibilities done on previous cultures   06/14/2023 Positive on the 1st day of incubation (A)  Final   06/14/2023 Staphylococcus aureus MRSA (AA)  Final     Comment:     2 of 2 bottles     05/26/2023 No anaerobic organisms isolated  Final   05/26/2023 No anaerobic organisms isolated  Final   05/26/2023 No anaerobic organisms isolated  Final   05/26/2023 No anaerobic organisms isolated  Final   05/26/2023 No Growth  Final   05/26/2023 No Growth  Final   05/26/2023 No Growth  Final   05/26/2023 No Growth  Final   05/26/2023 No anaerobic organisms isolated  Final   05/26/2023 No Growth  Final   04/07/2023 No anaerobic organisms isolated  Final   04/07/2023 No Growth  Final   04/07/2023 No anaerobic organisms isolated  Final   04/07/2023 No Growth  Final   04/07/2023 No anaerobic organisms isolated  Final   04/07/2023 No Growth  Final   04/07/2023 No anaerobic organisms isolated  Final   04/07/2023 No Growth  Final   04/07/2023 No anaerobic organisms isolated  Final   04/07/2023 No Growth  Final   04/07/2023 No anaerobic organisms isolated  Final   04/07/2023 No Growth  Final   11/22/2022 4+ Finegoldia magna (A)  Final     Comment:     Susceptibilities done on previous cultures   11/22/2022 No Growth  Final   11/22/2022 1+ Proteus mirabilis (A)  Final     Comment:     Susceptibilities done on previous cultures   11/22/2022 1+ Finegoldia magna (A)  Final     Comment:     Susceptibilities done on previous cultures   11/22/2022 No Growth  Final   11/22/2022 1+ Proteus mirabilis (A)  Final   11/22/2022 No anaerobic organisms isolated  Final   11/22/2022 No anaerobic organisms isolated  Final   11/22/2022 No anaerobic organisms isolated  Final   11/22/2022 No Growth  Final   11/22/2022 No Growth  Final    11/22/2022 No Growth  Final   11/22/2022 Isolated in broth only Proteus mirabilis (A)  Final     Comment:     On day 1 of incubationSusceptibilities done on previous cultures   11/22/2022 Isolated in broth only Proteus mirabilis (A)  Final     Comment:     On day 1 of incubationSusceptibilities done on previous cultures   11/22/2022 1+ Proteus mirabilis (A)  Final   11/22/2022 No anaerobic organisms isolated  Final   11/22/2022 No Growth  Final   11/22/2022 1+ Proteus mirabilis (A)  Final     Comment:     Susceptibilities done on previous cultures   11/14/2022 4+ Porphyromonas species (A)  Final     Comment:     Beta Lactamase Positive  Identification obtained by MALDI-TOF mass spectrometry research use only database. Test characteristics determined and verified by the Infectious Diseases Diagnostic Laboratory.   11/14/2022 1+ Finegoldia magna (A)  Corrected   11/14/2022 3+ Proteus mirabilis (A)  Final   11/14/2022 No Growth  Final     Metabolic Studies       Recent Labs   Lab Test 07/05/23  0931 07/05/23  0823 07/05/23  0700 07/05/23  0155 07/04/23  2324 07/04/23  1758 07/04/23  1230 07/04/23  0627 07/03/23  1346 07/03/23  1138 07/02/23  1754 07/02/23  1149 07/01/23  1106 07/01/23  1048 06/26/23  0738 06/26/23  0513 06/16/23  0820 06/16/23  0605 03/21/23  0810 03/20/23  1919 01/10/23  1710 01/10/23  1105     --  142  --   --   --   --  138  --  140  --  137  --  138   < > 139   < > 141   < >  --    < >  --    POTASSIUM 4.3  --  4.5  --   --   --   --  4.2  --  4.2  --  4.3  --  4.4   < > 4.7   < > 4.2   < >  --    < >  --    CHLORIDE 109*  --  108*  --   --   --   --  106  --  108*  --  107  --  106   < > 110*   < > 104   < >  --    < >  --    CO2 19*  --  16*  --   --   --   --  22  --  20*  --  21*  --  23   < > 20*   < > 24   < >  --    < >  --    ANIONGAP 13  --  18*  --   --   --   --  10  --  12  --  9  --  9   < > 9   < > 13   < >  --    < >  --    BUN 45.9*  --  46.3*  --   --   --   --  41.7*  --   39.0*  --  39.1*  --  38.5*   < > 42.8*   < > 27.7*   < >  --    < >  --    CR 3.36*  --  3.30*  --   --   --   --  2.91*  --  2.51*  --  2.20*  --  2.18*   < > 1.42*   < > 1.14   < >  --    < >  --    GFRESTIMATED 19*  --  19*  --   --   --   --  22*  --  27*  --  31*  --  32*   < > 53*   < > 69   < >  --    < >  --    GLC 90 93 99 118* 109* 103*   < > 108*   < > 120*   < > 128*   < > 151*   < > 134*   < > 171*   < >  --    < >  --    A1C  --   --   --   --   --   --   --   --   --   --   --   --   --   --   --   --   --  5.6  --   --    < >  --    MAIKOL 7.9*  --  7.7*  --   --   --   --  8.0*  --  7.9*  --  7.9*  --  7.8*   < > 7.6*   < > 8.3*   < >  --    < >  --    PHOS  --   --   --   --   --   --   --   --   --   --   --   --   --  3.7  --  3.7   < >  --   --   --   --   --    MAG  --   --  1.7  --   --   --   --   --   --   --   --  1.7   < > 1.4*  --   --    < > 1.7   < >  --   --   --    LACT  --   --   --   --   --   --   --   --   --   --   --   --   --   --   --   --   --   --   --  1.3  --  0.9    < > = values in this interval not displayed.     Hepatic Studies    Recent Labs   Lab Test 07/04/23  0627 06/26/23  0513 06/25/23  0615 06/24/23  0654 06/23/23  0840 06/22/23  1341 06/22/23  0711 06/20/23  0704 06/19/23  0915 06/16/23  0605 06/14/23  0651 05/31/23  0630   BILITOTAL 0.2  --   --   --   --   --   --  0.2 0.2 0.2 0.2 0.2   ALKPHOS 99  --   --   --   --   --   --  93 94 114 110 74   ALBUMIN 2.6* 2.2* 2.3* 2.4* 2.4* 2.4*   < > 2.3* 2.6* 2.8* 2.8* 3.2*   AST 11  --   --   --   --   --   --  14 19 16 16 14   ALT 14  --   --   --   --   --   --  14 12 10 8 6*    < > = values in this interval not displayed.     Hematology Studies      Recent Labs   Lab Test 07/05/23  0934 07/04/23  0627 07/03/23  1138 07/01/23  1048 06/26/23  0513 06/25/23  0615 06/24/23  0654   WBC 9.9 12.0* 11.6* 11.7*  --  13.9* 14.9*   HGB 7.2* 7.5* 7.5* 7.6* 7.6* 6.8* 7.3*   HCT 23.6* 23.9* 24.3* 24.4*  --  22.1* 23.0*    353  403 362  --  310 290     Arterial Blood Gas Testing    Recent Labs   Lab Test 07/04/23 2017 07/03/23  1138 06/15/23  1325 11/26/22  0945   PH  --   --  7.39  --    PCO2  --   --  44  --    PO2  --   --  83  --    HCO3  --   --  26  --    O2PER 0 22 2 32          Latest Ref Rng & Units 7/5/2023     9:34 AM 7/5/2023     9:31 AM 7/5/2023     7:00 AM 7/4/2023     6:27 AM 7/3/2023    11:38 AM   Transplant Immunosuppression Labs   Creat 0.67 - 1.17 mg/dL  3.36  3.30  2.91  2.51    Urea Nitrogen 8.0 - 23.0 mg/dL  45.9  46.3  41.7  39.0    WBC 4.0 - 11.0 10e3/uL 9.9    12.0  11.6           Imaging:   XR Chest Port 1 View  Result Date: 6/14/2023  IMPRESSION: Negative portable view. If further detail is needed comment consider upright PA and lateral examination when clinical condition permits. TAE MORRIS MD   SYSTEM ID:  [id number]    XR Abdomen 1 View  Result Date: 6/10/2023  IMPRESSION: Nonobstructive bowel gas pattern. Large stool burden. I have personally reviewed the examination and initial interpretation and I agree with the findings. JONNATHAN COLEMAN MD   SYSTEM ID:  [id number]

## 2023-07-06 NOTE — CONSULTS
[facility]  Palliative Care Consultation Note    Patient: Waldo Bustos  Date of Admission:  6/17/2023    Requesting Clinician / Team: King Burnham MD  Reason for consult: Goals of care  Decisional support  Patient and family support    Recommendations:    Brother(s) are currently his surrogate decision makers. There is Mcgarry and two others.    Goals are currently restorative to help Don regain the ability to be his own decision maker    Attempting to avoid feeding tube placement with encouragement of eating and supplements, brother wants supplements if any are orange flavored on ice, no dialysis today and seems that will need to be discussed if it becomes needed in the moment, ongoing discussion of interventions    Care conference early next week to discuss goals of care further with all brothers     These recommendations have been discussed with primary team.      Thank you for the opportunity to participate in the care of this patient and family. Our team: will continue to follow.     During regular M-F work hours -- if you are not sure who specifically to contact -- please contact us by sending a text page to our team consult pager at [phone].    After regular work hours and on weekends/holidays, you can call our answering service at [phone]. Also, who's on call for us is available in [employer].       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 11/22/22 for a scheduled explant of the left hip prosthesis, debridement, and reconstruction with antibiotics spacer. His post operative course has been complicated by deconditioning. He was discharged to TCU on 12/23/22 and was subsequently readmitted on 5/26/23 for left total hip arthroplasty and reimplantation  "and discharged 6/2. He most recently presented again on 6/17 with MRSA bacteremia. His present hospital course has been complicated by HALEY and declining function likely needing dialysis, encephalopathy.     Today, the patient was seen for:  Encephalopathy  HALEY  Bacteremia  Sepsis     Prognosis, Goals, & Planning:      Functional Status just prior to hospitalization: 4 (Completely disabled and dependent on others for selfcare; bedbound)      Prognosis, Goals, and/or Advance Care Planning were addressed today: Yes        Summary/Comments: I visited with Rahat and Zeferino today. I introduced palliative care and ways in which we can be helpful including symptom management, decisional support (goals of care), and additional support. Discussed that Rahat does not have capacity currently and although he does not have a HCD, Zeferino and the brothers are his surrogate decision makers given next of kin. Rahat was able to verbally communicate some, I asked him if he wants a feeding tube, he said no, and he couldn't communicate his understanding of needing one, I asked him if he didn't get one what would happen, and he said \"I would die\". The hope is with ongoing treatments and adjustments that were made he can regain the ability to be his own decision maker. Discussed that the brother wants to try to talk to Rahat more about encouraging nutrition or accepting a feeding tube. Rahat has never told his family his wishes before and has always been his own decision maker. Discussed having a family care conference next week to discuss further which also gives us more time to see how much his mind recovers and what this kidneys do.       Patient's decision making preferences: unable to assess          Patient has decision-making capacity today for complex decisions: No            I have concerns about the patient/family's health literacy today: No           Patient has a completed Health Care Directive: No.       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 His post operative course has been complicated by deconditioning. He was discharged to TCU on 12/23/22 and was subsequently readmitted on 5/26/23 for left total hip arthroplasty and reimplantation and discharged 6/2. He most recently presented again on 6/17 with MRSA bacteremia. His present hospital course has been complicated by HALEY and declining function likely needing dialysis, encephalopathy.     Palliative care consulted 7/6 for goals of care      Key Palliative Symptom Data:  We are not helping to manage these symptoms currently in this patient.        ROS:  Comprehensive ROS is reviewed and is negative except as here & per HPI:      Past Medical History:  Past Medical History:   Diagnosis Date     Arthritis 5 years ago     Chronic osteoarthritis Not sure     Diabetes (H) 10-12 years ago     DVT (deep venous thrombosis) (H)      Dyslipidemia      Gastroesophageal reflux disease      History of blood transfusion      Iliopsoas abscess (H)      Infection of prosthetic hip joint (H)      JAIR (obstructive sleep apnea)      Pulmonary embolism (H)      RA (rheumatoid arthritis) (H) Not sure     Reduced vision 2-3 years ago    Cataract Surgery on both eyes     Venous insufficiency         Past Surgical History:  Past Surgical History:   Procedure  Laterality Date     ARTHROPLASTY REVISION HIP Left 5/26/2023    Procedure: Revision Left Total Hip Arthroplasty;  Surgeon: Rom Meyers MD;  Location: [facility]     ARTHROPLASTY REVISION HIP Left 6/21/2023    Procedure: IRRIGATION AND DEBRIDEMENT LEFT HIP,;  Surgeon: Rom Meyers MD;  Location: [facility] OR     ASPIRATION NEEDLE HIP Left 4/7/2023    Procedure: ARTHROCENTESIS, LEFT HIP;  Surgeon: Rom Meyers MD;  Location: [facility] OR     Cataract       COLONOSCOPY       EGD       IR CVC TUNNEL PLACEMENT > 5 YRS OF AGE  6/27/2023     IR FINE NEEDLE ASPIRATION W ULTRASOUND  3/6/2023     IR IVC FILTER PLACEMENT      removed     IRRIGATION AND DEBRIDEMENT HIP, PLACE ANTIBIOTIC CEMENT BEADS / SPACER Left 11/22/2022    Procedure: and Reconstruction Using G 20 Prosthetic Antibiotic Cement Spacer;  Surgeon: Rom Meyers MD;  Location: [facility] OR     REMOVE ANTIBIOTIC CEMENT BEADS / SPACER HIP Left 5/26/2023    Procedure: Explantation of Left Hip Antibiotic Spacer;  Surgeon: Rom Meyers MD;  [facility]: [facility] OR     REMOVE HARDWARE ARTHROPLASTY HIP Left 11/22/2022    Procedure: Explantation of Chronic  Infected Left Total Hip Arthroplasty, Extended Trochanteric Osteotomy with Extensive Debridement;  Surgeon: Rom Meyers MD;  [facility]     SYNOVIAL BIOPSY HIP Left 4/7/2023    Procedure: BIOPSY, SYNOVIUM, LEFT  HIP, percutaneous;  Surgeon: Rom Meyers MD;  Location: [facility] OR     [facility] PELVIS/HIP JOINT SURGERY UNLISTED       [facility] STOMACH SURGERY PROCEDURE UNLISTED  1954    2 Hernia surgeries as a child         Family History:  Family History   Adopted: Yes   Problem Relation Age of Onset     Diabetes No family hx of      Rheumatoid Arthritis No family hx of      Low Back Problems No family hx of      Unknown/Adopted No family hx of          Allergies:  Allergies   Allergen Reactions     Sulfa Antibiotics      Other reaction(s): *Unknown - Childhood Rxn     Tizanidine Other (See Comments)     Frequent urination; causes  drowsiness, and dry mouth          Medications:  I have reviewed this patient's medication profile and medications from this hospitalization.   Noted:  Senna BID  Oxycodone PRN-x2  Tramadol PRN-x1    Physical Exam:  Vital Signs: Temp: 97.7  F (36.5  C) Temp src: Oral BP: 118/43 Pulse: 67   Resp: 16 SpO2: 99 % O2 Device: None (Room air)    Weight: 277 lbs 12.47 oz    Constitutional: Awake, alert, cooperative, no apparent distress  Lungs: No increased work of breathing, good air exchange  Musculoskeletal: No redness, warmth, or swelling of the joints.    Neurologic: Awake, alert, oriented to name  Skin: No rashes, erythema, pallor    Data reviewed:  Recent imaging reviewed, my comments on pertinents:   CT head 7/4  Impression:   1. No acute intracranial pathology.   2. Periventricular white matter hypodensities which are nonspecific,   but likely representing chronic small vessel ischemic disease.   3. Moderate cerebral atrophy.     I have personally reviewed the examination and initial interpretation   and I agree with the findings.     JOSE COMER MD     Recent lab data reviewed, my comments on pertinents:   Sodium 137  Potassium 4.0  Creatinine 4.05  GFR 15  WBC 10.5  Hemoglobin 6.9  Platelets 307    JOSEPH Rice CNS  [facility], Palliative Care  Securely message with the [employer] Web Console (learn more here) or  Text page via [employer] Paging/Directory     90 minutes spent chart reviewing, coordinating care with medical team, discussing goals of care with patient and family, providing education regarding medical options, and documenting.

## 2023-07-06 NOTE — PROGRESS NOTES
Brief SW Note:      1050: SW left (4th)  for pt's nephew/Financial POA, Pollo Bustos (PH: [phone]), requested a call back for an update re: MA for LTC application and/or status of asset spend down progress.          ANTONETTE MinorW, LSW  [facility]   PHONE: [phone]  Pager: [phone]

## 2023-07-06 NOTE — PHARMACY-VANCOMYCIN DOSING SERVICE
Pharmacy Vancomycin Note  Date of Service 2023  Patient's  1950   72 year old, male    Indication: MRSA bacteremia, vancomycin MARCOS </= 0.5  Day of Therapy: Since 6/15  Current vancomycin regimen: Intermittent dosing (last received 1500 mg IV once on )  Current vancomycin monitoring method: Trough (Method 2 = manual dose calculation)  Current vancomycin therapeutic monitoring goal: 15-20 mg/L    ICurrent estimated CrCl = Estimated Creatinine Clearance: 23 mL/min (A) (based on SCr of 3.93 mg/dL (H)).    Creatinine for last 3 days  7/3/2023: 11:38 AM Creatinine 2.51 mg/dL  2023:  6:27 AM Creatinine 2.91 mg/dL  2023:  7:00 AM Creatinine 3.30 mg/dL;  9:31 AM Creatinine 3.36 mg/dL  2023:  7:13 AM Creatinine 3.93 mg/dL    Recent Vancomycin Levels (past 3 days)  2023: 12:37 PM Vancomycin 25.5 ug/mL  2023:  7:00 AM Vancomycin 25.7 ug/mL  2023:  7:13 AM Vancomycin 22.8 ug/mL    Vancomycin IV Administrations (past 72 hours)      No vancomycin orders with administrations in past 72 hours.                Nephrotoxins and other renal medications (From now, onward)    Start     Dose/Rate Route Frequency Ordered Stop    23 0800  [Held by provider]  furosemide (LASIX) tablet 60 mg        (Held by provider since 2023 at 0943 by Hector Burnham MD.Hold Reason: Acute Kidney Injury)    60 mg Oral DAILY 23 1538      23 0813  vancomycin place montilla - receiving intermittent dosing         1 each Intravenous SEE ADMIN INSTRUCTIONS 23               Contrast Orders - past 72 hours (72h ago, onward)    None          Interpretation of levels and current regimen:  Vancomycin level is reflective of supratherapeutic level    Has serum creatinine changed greater than 50% in last 72 hours: No    Urine output:  unable to determine    Renal Function: Worsening    Plan:  1. 1.No dose today, will recheck a trough level tomorrow with am labs  2. Vancomycin monitoring  method: Trough (Method 2 = manual dose calculation)  3. Vancomycin therapeutic monitoring goal: 15-20 mg/L  4. Pharmacy will check vancomycin levels as appropriate in 1-3 Days.  5. Serum creatinine levels will be ordered daily for the first week of therapy and at least twice weekly for subsequent weeks.    Ivan Garcia, PharmD, BCPS

## 2023-07-06 NOTE — CONSULTS
DIAGNOSTIC IMPRESSION:    1. Metabolic/toxic encephalopathy, likely due to sepsis, cefepime neurotoxicity, and/or worsening kidney function.   2. No evidence of electrographic seizures on EEG.     RECOMMENDATIONS:    1. Continue supportive cares. Discuss with nephrology whether dialysis would expedite removal of cefepime from blood.  2. Consider empirical thiamine administration (500 mg IV daily)- patients with uremia and poor appetite/weight loss are at high risk for thiamine deficiency and Wernicke.    3. No further neurological diagnostic tests needed.     COMMENTS: The patient's examination is very characteristic of a metabolic/toxic encephalopathy, as in addition to the confusion, he has striking bilateral asterixis, and symmetric hyperreflexia.  Differential diagnosis includes uremic and septic encephalopathy, but most likely this is cefepime neurotoxicity.  Cefepime tends to accumulate when kidney function declines, and the timing of administration of the drug seems to correlate well with the worsening of his encephalopathy. EEG performed at bedside today showed no evidence of electrographic seizures/status epilepticus.  CT of the head did not show acute findings.  I do not think that the patient needs further neurological work-up.  Recommend supportive cares.  Please discuss with nephrology, whether doing hemodialysis now would expedite clearance of cefepime from the blood. It may also be reasonable to give him empirical thiamine IV (see above why)    I noted that the patient's neck showed rigidity with forward, but also lateral movements.  The latter is not typical for meningitis, and suggests paratonia/poor cooperation from the patient, or neck arthritis.  I do not think an LP is needed.    Ted Kern MD, FAAN    TEAM REQUESTING CONSULTATION:  Hospitalist    REASON FOR CONSULTATION: encephalopathy      HPI: This is a 72 year old patient whom we were consulted on because of ongoing encephalopathy.  He has a chronic left hip prosthetic joint infection, most recently s/p revision left BELLA on 5/26. He was in TCU since discharge on 6/2, but then developed MRSA bacteremia with confusion and somnolence. Blood cultures remained persistently positive for MRSA ( 6/14-6/17) but have been negative since 6/19/23. KEILA negative. Presumed hip infection and cultures were obtained from drain on 6/17/2023 - showing MRSA, Klebsiella, and Corynebacterium. Patient taken to OR for DAIR with Dr. Meyers on 6/21.   He was treated with vancomycin, and until 7/4, cefepime. Cefepime was switched to ceftazidime on 7/4.  He has been having worsening encephalopathy in the last few days.  This coincided with a decline in his kidney function.  His creatinine was 2.15 days ago, and now it is up to 3.9.  Nephrology has been consulted.          Past Medical History:   Diagnosis Date     Arthritis 5 years ago     Chronic osteoarthritis Not sure     Diabetes (H) 10-12 years ago     DVT (deep venous thrombosis) (H)      Dyslipidemia      Gastroesophageal reflux disease      History of blood transfusion      Iliopsoas abscess (H)      Infection of prosthetic hip joint (H)      JAIR (obstructive sleep apnea)      Pulmonary embolism (H)      RA (rheumatoid arthritis) (H) Not sure     Reduced vision 2-3 years ago    Cataract Surgery on both eyes     Venous insufficiency          Allergies   Allergen Reactions     Sulfa Antibiotics      Other reaction(s): *Unknown - Childhood Rxn     Tizanidine Other (See Comments)     Frequent urination; causes drowsiness, and dry mouth            ROS: 10 point ROS neg other than the symptoms noted above in the HPI.      FAMILY HISTORY:      Social History     Socioeconomic History     Marital status:      Spouse name: None     Number of children: None     Years of education: None     Highest education level: None   Tobacco Use     Smoking status: Never     Smokeless tobacco: Never   Substance and Sexual Activity      Alcohol use: No     Drug use: No     Sexual activity: Never           Current Facility-Administered Medications:      acetaminophen (TYLENOL) tablet 650 mg, 650 mg, Oral, Q4H PRN, Raymond Garcia PA-C, 650 mg at 07/01/23 1241     apixaban ANTICOAGULANT (ELIQUIS) tablet 5 mg, 5 mg, Oral, BID, Raymond Garcia PA-C, 5 mg at 07/05/23 1956     atorvastatin (LIPITOR) tablet 40 mg, 40 mg, Oral, Daily, Raymond Garcia PA-C, 40 mg at 07/04/23 0846     cefTAZidime (FORTAZ) 2 g vial to attach to  ml bag for ADULTS or NS 50 ml bag for PEDS, 2 g, Intravenous, Q24H, Hector Burnham MD, 2 g at 07/05/23 1956     glucose gel 15-30 g, 15-30 g, Oral, Q15 Min PRN **OR** dextrose 50 % injection 25-50 mL, 25-50 mL, Intravenous, Q15 Min PRN **OR** glucagon injection 1 mg, 1 mg, Subcutaneous, Q15 Min PRN, Raymond Garcia PA-C     ferrous sulfate (FEROSUL) tablet 325 mg, 325 mg, Oral, Every Other Day, Jem Malloy DO, 325 mg at 07/03/23 1248     folic acid (FOLVITE) tablet 1 mg, 1 mg, Oral, Daily, Jem Malloy DO, 1 mg at 07/04/23 0846     [Held by provider] furosemide (LASIX) tablet 60 mg, 60 mg, Oral, Daily, Caden Frazier MD, 60 mg at 07/04/23 0846     heparin lock flush 10 UNIT/ML injection 5-20 mL, 5-20 mL, Intracatheter, Q24H, Que Modi PA-C, 5 mL at 07/03/23 1400     heparin lock flush 10 UNIT/ML injection 5-20 mL, 5-20 mL, Intracatheter, Q1H PRN, Que Modi PA-C, 5 mL at 07/05/23 2350     insulin aspart (NovoLOG) injection (RAPID ACTING), 1-7 Units, Subcutaneous, TID AC, Raymond Garcia PA-C, 1 Units at 07/01/23 1305     insulin aspart (NovoLOG) injection (RAPID ACTING), 1-5 Units, Subcutaneous, At Bedtime, Raymond Garcia PA-C, 1 Units at 06/22/23 2121     lactobacillus rhamnosus (GG) (CULTURELL) capsule 1 capsule, 1 capsule, Oral, BID, Raymond Garcia PA-C, 1 capsule at 07/05/23 1956     lidocaine (LMX4) cream, , Topical, Q1H PRN,  Jem Malloy DO     lidocaine 1 % 0.1-1 mL, 0.1-1 mL, Other, Q1H PRN, Jem Malloy DO     lidocaine 1 % 1-30 mL, 1-30 mL, Intradermal, Once PRN, Que Modi PA-C     melatonin tablet 1 mg, 1 mg, Oral, At Bedtime PRN, Raymond Garcia PA-C     [Held by provider] methocarbamol (ROBAXIN) tablet 500 mg, 500 mg, Oral, 4x Daily, Raymond Garcia PA-C, 500 mg at 06/29/23 1622     miconazole (MICATIN) 2 % powder, , Topical, BID, Cali Gomes MD, Given at 07/05/23 1956     multivitamin w/minerals (THERA-VIT-M) tablet 1 tablet, 1 tablet, Oral, Daily, Jem Malloy DO, 1 tablet at 07/04/23 1231     naloxone (NARCAN) injection 0.2 mg, 0.2 mg, Intravenous, Q2 Min PRN **OR** naloxone (NARCAN) injection 0.4 mg, 0.4 mg, Intravenous, Q2 Min PRN **OR** naloxone (NARCAN) injection 0.2 mg, 0.2 mg, Intramuscular, Q2 Min PRN **OR** naloxone (NARCAN) injection 0.4 mg, 0.4 mg, Intramuscular, Q2 Min PRN, Jeremy Salter MD     oxyCODONE IR (ROXICODONE) half-tab 2.5 mg, 2.5 mg, Oral, Q4H PRN, Hector Burnham MD, 2.5 mg at 07/06/23 0424     polyethylene glycol (MIRALAX) Packet 17 g, 17 g, Oral, BID, Jem Malloy DO, 17 g at 07/04/23 2314     senna-docusate (SENOKOT-S/PERICOLACE) 8.6-50 MG per tablet 1 tablet, 1 tablet, Oral, BID, Raymond Garcia PA-C, 1 tablet at 07/04/23 2311     sodium chloride (PF) 0.9% PF flush 10 mL, 10 mL, Intracatheter, Q8H, Jem Malloy DO, 10 mL at 07/06/23 1202     sodium chloride (PF) 0.9% PF flush 10-20 mL, 10-20 mL, Intracatheter, q1 min prn, Que Modi PA-C     sodium chloride (PF) 0.9% PF flush 10-20 mL, 10-20 mL, Intracatheter, q1 min prn, Jem Malloy DO     sodium chloride (PF) 0.9% PF flush 10-40 mL, 10-40 mL, Intracatheter, Q8H, Que Modi PA-C, 10 mL at 07/05/23 0540     sodium chloride (PF) 0.9% PF flush 10-40 mL, 10-40 mL, Intracatheter, Once PRN, Jem Malloy DO     sodium chloride (PF)  0.9% PF flush 3 mL, 3 mL, Intracatheter, q1 min prn, Raymond Garcia PA-C     sodium chloride 0.9% infusion, , Intravenous, Continuous, Hector Burnham MD, Last Rate: 75 mL/hr at 07/06/23 1410, $Started at 07/06/23 1410     traMADol (ULTRAM) tablet 50 mg, 50 mg, Oral, Q6H PRN, Raymond Garcia PA-C, 50 mg at 07/05/23 0411     vancomycin place montilla - receiving intermittent dosing, 1 each, Intravenous, See Admin Instructions, Caden Frazier MD    Lab Results   Component Value Date    WBC 10.5 07/06/2023     Lab Results   Component Value Date    RBC 2.33 07/06/2023     Lab Results   Component Value Date    HGB 6.9 07/06/2023     Lab Results   Component Value Date    HCT 22.7 07/06/2023     No components found for: MCT  Lab Results   Component Value Date    MCV 97 07/06/2023     Lab Results   Component Value Date    MCH 29.6 07/06/2023     Lab Results   Component Value Date    MCHC 30.4 07/06/2023     Lab Results   Component Value Date    RDW 15.0 07/06/2023     Lab Results   Component Value Date     07/06/2023     Last Comprehensive Metabolic Panel:  Sodium   Date Value Ref Range Status   07/06/2023 140 136 - 145 mmol/L Final     Potassium   Date Value Ref Range Status   07/06/2023 3.9 3.4 - 5.3 mmol/L Final   02/06/2023 4.2 3.4 - 5.3 mmol/L Final     Chloride   Date Value Ref Range Status   07/06/2023 108 (H) 98 - 107 mmol/L Final   02/06/2023 110 (H) 94 - 109 mmol/L Final     Carbon Dioxide (CO2)   Date Value Ref Range Status   07/06/2023 21 (L) 22 - 29 mmol/L Final   02/06/2023 25 20 - 32 mmol/L Final     Anion Gap   Date Value Ref Range Status   07/06/2023 11 7 - 15 mmol/L Final   02/06/2023 5 3 - 14 mmol/L Final     Glucose   Date Value Ref Range Status   07/06/2023 108 (H) 70 - 99 mg/dL Final   02/06/2023 124 (H) 70 - 99 mg/dL Final     GLUCOSE BY METER POCT   Date Value Ref Range Status   07/06/2023 98 70 - 99 mg/dL Final     Urea Nitrogen   Date Value Ref Range Status  "  07/06/2023 46.5 (H) 8.0 - 23.0 mg/dL Final   02/06/2023 39 (H) 7 - 30 mg/dL Final     Creatinine   Date Value Ref Range Status   07/06/2023 3.93 (H) 0.67 - 1.17 mg/dL Final     GFR Estimate   Date Value Ref Range Status   07/06/2023 16 (L) >60 mL/min/1.73m2 Final     Calcium   Date Value Ref Range Status   07/06/2023 7.8 (L) 8.8 - 10.2 mg/dL Final     Bilirubin Total   Date Value Ref Range Status   07/06/2023 <0.2 <=1.2 mg/dL Final     Alkaline Phosphatase   Date Value Ref Range Status   07/06/2023 103 40 - 129 U/L Final     ALT   Date Value Ref Range Status   07/06/2023 12 0 - 70 U/L Final     Comment:     Reference intervals for this test were updated on 6/12/2023 to more accurately reflect our healthy population. There may be differences in the flagging of prior results with similar values performed with this method. Interpretation of those prior results can be made in the context of the updated reference intervals.       AST   Date Value Ref Range Status   07/06/2023 9 0 - 45 U/L Final     Comment:     Reference intervals for this test were updated on 6/12/2023 to more accurately reflect our healthy population. There may be differences in the flagging of prior results with similar values performed with this method. Interpretation of those prior results can be made in the context of the updated reference intervals.     TSH   Date Value Ref Range Status   06/16/2023 1.23 0.30 - 4.20 uIU/mL Final     Ammonia within normal limits. (7/4)    Head CT 7/4: No acute intracranial pathology (reviewed)  EEG 3 hour (7/6): Discussed with Dr Alonso. Preliminary impression: Diffuse severe theta/delta slowing, some generalized periodic discharges, 2 Hz, not sustained. No electrographic seizures              PHYSICAL EXAMINATION:    Vital signs:   @FLOWREFRESH([id number]::), )Blood pressure (!) 155/62, pulse 79, temperature 98.4  F (36.9  C), temperature source Oral, resp. rate 16, height 1.778 m (5' 10\"), weight 126 kg (277 lb " "12.5 oz), SpO2 95 %.  Estimated body mass index is 39.86 kg/m  as calculated from the following:    Height as of this encounter: 1.778 m (5' 10\").    Weight as of this encounter: 126 kg (277 lb 12.5 oz).        NEURO:    Neck examination shows stiffness (rigidity) with forward flexion, but also lateral flexion to the right or left.    Mental status: Awake, alert of surroundings.  Not oriented to time, place, or situation.  Can follow simple one-step commands, but not to steps.  Bradyphrenia and perseveration are present.  He has a positive grasp reflex bilaterally.    CN: PERRL. EOMI. No nystagmus.  Face and smile are symmetric.  Tongue protrudes at midline.  There is no atrophy or fasciculations.  Lateral tongue movements are normal.    Motor: Moves all 4 limbs symmetrically upon command.  Normal upper extremity strength proximally and distally.  Lower extremity strength difficult to test due to pain, especially in the left leg.  Left lower extremity very tender to touch especially from knee below. Paratonia in bilateral upper extremities.    Reflexes: 3+ brisk and symmetric of bilateral upper extremities, and bilateral knees. Plantar responses difficult to gauge due to brisk withdrawal.    Sensory:ND    Coordination: No dysmetria on finger-to-nose.  Prominent bilateral asterixis, and stimulus induced myoclonus.    Gait: ND       "

## 2023-07-06 NOTE — PLAN OF CARE
Goal Outcome Evaluation:      Plan of Care Reviewed With: patient    Overall Patient Progress: declining    Outcome Evaluation: Pt with poor intake related to AMS and poor appetite. Discontinuing Glucerna, enteral nutrition recommendations provided. See RD note 7/6 for details.

## 2023-07-06 NOTE — PROGRESS NOTES
ZEB wrote an email asking Nephew/Luke to respond about where we are at with MA Application. ZEB asked Pollo to respond by next Tuesday, end of business day.     NEREYDA Sharp   [facility], Transitional Care Unit   Social Work   [facility] [address] [place] [address]  (ph) [phone]

## 2023-07-06 NOTE — PLAN OF CARE
"0700-  /41 (BP Location: Left arm)   Pulse 71   Temp (!) 96.7  F (35.9  C) (Oral)   Resp 16   Ht 1.778 m (5' 10\")   Wt 126 kg (277 lb 12.5 oz)   SpO2 95%   BMI 39.86 kg/m      Pt alert, opens eyes spontaneously but difficult to assess orientation. Expressive aphasia, difficulty finding words. Pt uses short words such as \"yes, no\" Refused assessments and medications.  3+ edema on LLE, 2+ on LLE. 1+ pulse on left foot, 2+ pulse on right foot.Refused breakfast. Current blood sugar 114. No insuline coverage needed at this time.    Hemoglobin 6.9, Notified Dr. Burnham. Possible blood transfusion but will discuss with brother per MD. Verbal consent per MD. Awaiting type and screen. Blood transfusion orders received.    1500 Pt refused labs. Will try again.    NS bolus given per orders.  "

## 2023-07-06 NOTE — PROGRESS NOTES
ZEB wrote an email asking Nephew/Luke to respond about where we are at with MA Application. ZEB asked Pollo to respond by next Tuesday, end of business day.     NEREYDA Sharp   [facility], Transitional Care Unit   Social Work   [facility] [address] [place] [address]  (ph) [phone]

## 2023-07-06 NOTE — PROGRESS NOTES
"Orthopedic Surgery Progress Note: 07/07/2023    Subjective:   Confusion continues. Renal following for worsening HALEY. Little movement. Not eating much and refusing meals. Deferring cares including repositioning. Left leg still very sensitive. Transfused 1U pRBCs overnight. Recheck pending.    Objective:   /53   Pulse 68   Temp 97.7  F (36.5  C) (Oral)   Resp 16   Ht 1.778 m (5' 10\")   Wt 126 kg (277 lb 12.5 oz)   SpO2 98%   BMI 39.86 kg/m    No intake/output data recorded.  General: NAD. Resting comfortably in bed.  Respiratory: Breathing comfortably on RA.  Musculoskeletal: Focused exam of the left lower extremity: Dressings c/d/i.  Fires GSC, TA, FHL, and EHL minimally. Incredibly sensitive to touch on feet. Overall decreased sensation distally. Brisk capillary refill.     Laboratory Data:  Lab Results   Component Value Date    WBC 10.5 07/06/2023    HGB 6.9 (LL) 07/06/2023     07/06/2023      Intraoperative cultures   6/21/2023: MRSA, C Acnes, Pseudomonas    Assessment & Plan:   Waldo Bustos is a 72 year old male status post left revision total hip arthroplasty on 5/26/2023 with Dr. Meyers now presenting with left hip prosthetic joint infection status post left hip irrigation and debridement on 6/21/2023 with Dr. Meyers.    Postoperatively noted to have confusion and increasing creatinine. Thorough workup by primary, Neuro, renal suggests metabolic encephalopathy.     #anemia. Transfused again evening of 7/6.    CRP downtrending. C acnes, MRSA in cultures. Treating with vanc/cefepime, per ID recs. Appreciate assistance.    Consider billy catheter.    Ortho will continue to follow every few days. Please do not hesitate to page with questions/concerns.    Goals for Today:  - Pain control  - Mobilization - appreciate therapy recs  - Discharge planning    Ortho Plan:  - Activity: Up with assist until independent. Posterior hip precautions x 3 months (no flexion beyond 90 degrees, no adduction " beyond midline, and no internal rotation beyond midline).  - Weightbearing Status: WBAT LLE.  - Pain Management: Transition from IV to PO as tolerated.  - Antibiotics: Vancomycin and ceftazidime, per ID/primary.  - Diet: OK for a diet from an orthopedic perspective.  - DVT Prophylaxis: SCDs and PTA apixaban 5 mg BID.  - Imaging: None pending.  - Labs: Labs PRN and per primary  - Bracing/Splinting: Abduction pillow while in bed.  - Dressings: Dressing changed 7/3.  - Drains: GUERRERO drain removed 7/3  - Tran Catheter: None. Defer to primary but consider  - Physical Therapy/Occupational Therapy: Evaluation and treatment.  - Cultures: Intraoperative cultures 6/21/2023.  - Follow-up: Clinic with Dr. Meyers's team TBD (pending dispo plan). Will plan to get XR at that time.    - Disposition: TCU. Okay to discharge from an ortho perspective.     Hannah Starr MD, PGY-4  Orthopedics  Pager: [phone]

## 2023-07-06 NOTE — PROGRESS NOTES
Nephrology Progress Note  07/06/2023         Assessment & Recommendations:   A/Rec: 73yo M with persistent prosthetic joint infection of L hip admitted for MRSA bacteremia with recurrent HALEY.     #HALEY - Cr improved to 1.4 on 6/26 but has since risen to 3.93 today, likely secondary to hemodynamic instability and overdiuresis. Continued exposure to vanc could be contributing.   -off furosemide  - bicarb 21  -No indications for RRT yet, will continue to monitor closely    - recommend daily vanc levels  - transfer to intermediate care unit  - labs this afternoon  - will attempt to schedule goals of care conference with pt's family      #Volume overload - resolved  - UOP incontinent, unable to measure  - will attempt condom catheter as he would likely pull billy catheter out.   -- attempt bladder scan every 8 ours.   -- recommend maintenance fluids as long as patient is refusing to eat and drink  -management as per above    #Electrolytes  - Na 140, K 3.9 no acute issues  -CO2 21 relatively high in the setting of severe renal failure signaling likely underlying chloride depletion alkalosis    Recommendations were communicated to primary team via this note and verbally    Seen and discussed with Dr. Bryan.       OPAL MAYER, PADeeptiC     Interval History :   Reviewed provider and nursing notes for past 24 hours. Had CT head due to increasing confusion - negative acute. Neuro is consulted and EEG is planned. Cefepime stopped, Vanc level 25, repeat vanc today is 21. Has developed myoclonus. Spoke with primary team, unclear if this is delirium related to possible cefepime toxicity vs uremia. Pt responds to voice but drowsy.   Plan for goals of care conference with his family.       Review of Systems:   Unable to obtain.     Physical Exam:   I/O last 3 completed shifts:  In: 350 [P.O.:350]  Out: 125 [Urine:125]  Vitals: /67  Pulse 81  Resp 16  SpO2 96%  Temp 96.5  GENERAL APPEARANCE: drowsy, no distress  EYES:  no  scleral icterus  PULM: no respiratory distress   CV: regular rhythm, normal rate  Extremities: 1+ LE edema  NEURO: confused, myoclonus      Labs:   All labs reviewed by me  Electrolytes/Renal -   Recent Labs   Lab Test 07/06/23  1145 07/06/23  0750 07/06/23  0713 07/05/23  1728 07/05/23  0931 07/05/23  0823 07/05/23  0700 07/02/23  1754 07/02/23  1149 07/01/23  2208 07/01/23 2007 07/01/23  1106 07/01/23  1048 06/26/23  0738 06/26/23  0513   NA  --   --  140  --  141  --  142   < > 137  --   --   --  138   < > 139   POTASSIUM  --   --  3.9  --  4.3  --  4.5   < > 4.3  --   --   --  4.4   < > 4.7   CHLORIDE  --   --  108*  --  109*  --  108*   < > 107  --   --   --  106   < > 110*   CO2  --   --  21*  --  19*  --  16*   < > 21*  --   --   --  23   < > 20*   BUN  --   --  46.5*  --  45.9*  --  46.3*   < > 39.1*  --   --   --  38.5*   < > 42.8*   CR  --   --  3.93*  --  3.36*  --  3.30*   < > 2.20*  --   --   --  2.18*   < > 1.42*   GLC 98 114* 108*   < > 90   < > 99   < > 128*   < >  --    < > 151*   < > 134*   MAIKOL  --   --  7.8*  --  7.9*  --  7.7*   < > 7.9*  --   --   --  7.8*   < > 7.6*   MAG  --   --   --   --   --   --  1.7  --  1.7  --  1.5*  --  1.4*  --   --    PHOS  --   --  4.6*  --   --   --   --   --   --   --   --   --  3.7  --  3.7    < > = values in this interval not displayed.       CBC -   Recent Labs   Lab Test 07/06/23  0713 07/05/23  0934 07/04/23  0627   WBC 10.5 9.9 12.0*   HGB 6.9* 7.2* 7.5*    318 353       LFTs -   Recent Labs   Lab Test 07/06/23  0713 07/04/23  0627 06/26/23  0513 06/22/23  0711 06/20/23  0704   ALKPHOS 103 99  --   --  93   BILITOTAL <0.2 0.2  --   --  0.2   ALT 12 14  --   --  14   AST 9 11  --   --  14   PROTTOTAL 5.3* 5.9*  --   --  5.9*   ALBUMIN 2.4*  2.4* 2.6* 2.2*   < > 2.3*    < > = values in this interval not displayed.       Iron Panel -   Recent Labs   Lab Test 06/26/23  0513   IRON 30*   IRONSAT 21   FADI 2,616*         Imaging:  Reviewed     Current  Medications:    sodium chloride 0.9%  500 mL Intravenous Once     apixaban ANTICOAGULANT  5 mg Oral BID     atorvastatin  40 mg Oral Daily     cefTAZidime  2 g Intravenous Q24H     ferrous sulfate  325 mg Oral Every Other Day     folic acid  1 mg Oral Daily     [Held by provider] furosemide  60 mg Oral Daily     heparin lock flush  5-20 mL Intracatheter Q24H     insulin aspart  1-7 Units Subcutaneous TID AC     insulin aspart  1-5 Units Subcutaneous At Bedtime     lactobacillus rhamnosus (GG)  1 capsule Oral BID     [Held by provider] methocarbamol  500 mg Oral 4x Daily     miconazole   Topical BID     multivitamin w/minerals  1 tablet Oral Daily     polyethylene glycol  17 g Oral BID     senna-docusate  1 tablet Oral BID     sodium chloride (PF)  10 mL Intracatheter Q8H     sodium chloride (PF)  10-40 mL Intracatheter Q8H     sodium chloride         vancomycin place montilla - receiving intermittent dosing  1 each Intravenous See Admin Instructions       OPAL MAYER PA-C

## 2023-07-06 NOTE — PLAN OF CARE
"Shift: 9160-4358  VS: BP (!) 152/59 (BP Location: Left arm)   Pulse 76   Temp (!) 96.3  F (35.7  C) (Oral)   Resp 16   Ht 1.778 m (5' 10\")   Wt 126 kg (277 lb 12.5 oz)   SpO2 94%   BMI 39.86 kg/m     Neuro: AOx1, only able to state name.  Word finding difficulties and slow to respond.  Frustrated when unable to do things independently.  Pt accepted all evening meds this shift.   CV: Denies CP  PU: ORA, breathing pattern regular, unlabored.   Activity: NOOB. A3-4, lift.  Pt refusing activity; repositioned as pt allows. Needing encouragement with turning for incontinence cares.   GI/: Incont B/B, difficulty using urinal d/t BUE tremors.  LBM 7/6, large loose BM x2; stool softeners held.   Diet: Reg diet, poor appetite- only ate a fruit cup, refused to eat rest of dinner tray.  Encouraging sips of water/juice with each interaction, tolerating well.  BG at bedtime 88, overnight 97  Pain: Endorsing pain to LLE with activity, tender to touch; premedicated with 2.5 Oxy, declined ICE.   Skin/Dressings: L hip incision CDI; L heel mepilex changed. Skin breakdown r/t moisture-  redness to buttocks and coccyx, barrier cream and sacral mepilex applied; redness to abd folds and groin, cleansed and applied sched miconazole powder.   Lines: R CVC SL heparin locked btwn ABX.   Plan: awaiting TCU placement, cleared to discharge from Ortho perspective.  Medicine, Nephrology, ID following.  Other info: Given 500mL NS bolus in the evening. Trending Hgb. RN manage K+ WNL. Needs anticipated, frequent rounding.  BA on. C ontact precaut maintained.  Care ongoing.       "

## 2023-07-06 NOTE — PROGRESS NOTES
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  Postop course complicated by profound deconditioning.  He was admitted to TCU initially on 12/23/2022 for physical rehabilitation, however therapies no longer worked with patient after several months of an in bed therapy and his refusals to mobilize.  Underwent biopsy on 4/7/2023 without evidence of infection.  He was transferred back to [facility] for left total hip arthroplasty and reimplantation on 5/26/23 with Dr. Meyers.  Presented again to [place] for persistent MRSA bacteremia on 6/17.     Brief Hospital Summary:    Pt was admitted to continue infectious workup with MRSA bacteremia.  Admission blood cultures were positive, ID and Ortho were consulted. KEILA was obtained on 6/19, which was negative for endocarditis, ortho took for washout/debridement of hip on 6/21. ID recommending 6 weeks abx (6/21-8/1), patient recovering from HALEY, but given history of HALEY's and need for long term vanc plus comorbids making declining kidney function in the future probable nephrology doesn't feel comfortable with patient getting a PICC -- Tunneled internal jugular line placed. Transitioned cefepime to cipro back to cefepime given HALEY and prolonged qtc, continue vancomycin.     Plan for today:  - nephrology following closely - repeat labs this PM, strict I/O, condom cath, MIVF, hold diuresis  - neurology consulted for worsening mental status - agree with current management, EEG today  - anemia - transfuse 1 unit PRBC,  consent from brother on phone  - off Cefepime since 7/4 in case that is contributing to mental status changes.  Ceftazidime and vanc to continue.   - low dose (2.5 mg) oxy with position changes/therapy  - discussed with nutrition poor po intake, recs pending  - palliative consult for [facility]    AMS due to Delirium from illness/hospitalization and possible toxic metabolic encephalopathy  - medications reduced as able, off cefepime since 7/4  - ? Uremia in setting of worsening renal failure and new myoclonus.  Nephrology following  - CT head, ammonia, vbg reassuring.    - neurology consulted 7/6    MRSA bacteremia due to possible left hip septic arthritis vs other source Blood cultures: 6/14, 6/15 + for MRSA (4/4), and 6/16 (1/2 GPCs) and 6/17 (2/2) GPCs. - started vancomycin 6/15.   6/17 sinovial fluid cultures with staph and klebsiella. Purulent drainage noted at incision site at TCU, CT left hip concerning for infection (no mention of joint, mostly anterior in soft tissue) and couldn't exclude abscess/inf  - Continue vancomycin, added rocephin 6/21 and changed to cefepime 6/23, ceftazidime 7/4; ID recommends 6 weeks IV vanc (last day 8/1/23) and can transition to oral cipro 750 mg BID at discharge, needs weekly vanc level, cbcd, cmp, crp while on IV abx, regular ECG monitoring of QTc on high dose cipro --> follow up outpatient with ID prior to discontinuing antibiotics (needs appt end of July)  - blood cultures negative 6/19 and 6/20 (last + was 6/17), trend CRP q48h (way down since initiation of treatment and washout)  - intraop cultures with Staph, Pseudomonas, and C acnes    Acute Kidney Injury - multifactorial, see below   Nephrology consulted, appreciate recs.   - baseline creatinine 1.0 or 1.1  - etiology: suspect combo of sepsis/inflammation, hypovolemia, blood loss anemia following surgery, vancomycin  - volume (hypovolemia/congestion): euvolemic but tough to assess based on habitus  - nephrotoxins:  vancomycin  - diabetes: yes; last A1C < 6, though  - sepsis: resolved now  - anemia: yes, ongoing  - cardiac performance: normal based on KEILA from 6/19  - inflammation: from surgery and infection  - creatinine downtrending; neph ordered C3 and 4, normal    Bilateral LE edema due to volume overload from HALEY and iatrogenic from blood transfusions  - HOLDING diuretics as above    Hypoxemic Respiratory Failure due to immobility, surgery - resolved (sena)  - patient with fluctuating oxygen requirement but off O2 since 6/23  - continue to follow; encourage IS    Acute blood loss anemia following surgery, complicated by use of blood thinner  - got 3 units day after surgery did joint washout (6/22) for acute blood loss anemia, 1 unit 6/25  - watch for bleeding; hold apixaban if need be - had unprovoked dvt in 2018 and would be on eliquis for life probably, at really high risk of recurrent DVT given recent procedures and illness/immobility  - iron and folic acid daily    S/p left hip explantation of left hip antibiotic spacer and revision of left total hip arthroplasty (5/26/2023)  - ortho performed washout 6/21; drain to come out in 2 weeks  -Activity: 50% PWB LLE with posterior hip precautions [id number]  -AFO ordered for foot drop but patient refusing to use  -PT/OT  -Knee to be kept even in a foam leg elevator to relax sciatic nerve  -Pain: holding robaxin 4 times a day with MS changes, APAP PRN                 -oxycodone  to 2.5 mg                  -tramadol 25-50 mg every 6 hours PRN    Hx of unprovoked DVT in 2018   - on eliquis 5 mg bid     Type 2 diabetes mellitus  - Last hemoglobin A1c 5.6% on 6/16/2023.  On glipizide, metformin, Actos, and Victoza prior to hospitalizations and surgery.   - continue ISS    Constipation  -Continue Colace 100 mg twice daily  -Continue senna 1 tablet twice daily  -Continue MiraLAX daily     Hypomagnesemia  -monitor and replete      Severe malnutrition in the context of acute on chronic  "illness  Goal for patient is to consume % of meals TID. Goal is 7668-9176 kcals/daily. Protein needs- 100-126 grams/daily.   -RD consult and appreciate recs  -regular diet  -Weekly weights, daily I/O     Adjustment disorder with depressed mood  Prolonged grief disorder  Patient has been with depressed mood in setting of ongoing health issues, as evidenced by lack of motivation to work with therapies during both TCU admissions.  Past history of passive suicidal statements. Psychiatry saw patient during hospital admission on 5/31 due to lack of motivation with therapies. Patient declined interest in starting any psychiatric medications. Was willing to engage with health psychology.   -Outpatient health psychology consult  -Continue to monitor for willingness to start antidepressant therapy     Stable and Resolved Issues:  Hyperlipidemia-continue PTA Lipitor 40 mg daily  JAIR-CPAP when sleeping     Diet: Snacks/Supplements Adult: Other; Chocholate or Butter Pecan Glucerna with bkf trays daily; With Meals  Regular Diet Adult  Room Service    DVT Prophylaxis: DOAC  Tran Catheter: Not present  Lines: PRESENT      CVC Single Lumen Right Internal jugular Non - valved (open ended);Tunneled;Power injectable-Site Assessment: WDL      Cardiac Monitoring: None  Code Status: Full Code      Clinically Significant Risk Factors              # Hypoalbuminemia: Lowest albumin = 2.2 g/dL at 6/26/2023  5:13 AM, will monitor as appropriate    # Acute Kidney Injury, unspecified: based on a >150% or 0.3 mg/dL increase in last creatinine compared to past 90 day average, will monitor renal function         # Obesity: Estimated body mass index is 39.86 kg/m  as calculated from the following:    Height as of this encounter: 1.778 m (5' 10\").    Weight as of this encounter: 126 kg (277 lb 12.5 oz).   # Severe Malnutrition: based on nutrition assessment         Disposition Plan      Expected Discharge Date: 07/08/2023        [facility]        Hector Burnham MD  Hospitalist Service, GOLD TEAM 21  [facility]  Securely message with [employer] (more info)  Text page via [facility] Paging/Directory   See signed in provider for up to date coverage information  ______________________________________________________________________    Interval History   Worsening confusion, wordfinding difficulty, and refusal of meds/food over last 24 hours. Intermittently following commands, not answering questions.    Physical Exam   Vital Signs: Temp: (!) 96.7  F (35.9  C) Temp src: Oral BP: 124/41 Pulse: 71   Resp: 16 SpO2: 95 % O2 Device: None (Room air)    Weight: 277 lbs 12.47 oz    Constitutional: sitting in bed  CV: regular rate and rhythm, no murmurs rubs or gallops  Resp: crackles to auscultation bilaterally  Abd: soft, non-tender, non-distended, no hepatosplenomegaly or masses palpated, hernia reducible   Neuro: alert, disoriented. CN grossly intact, WEEKS    Medical Decision Making       50 MINUTES SPENT BY ME on the date of service doing chart review, history, exam, documentation & further activities per the note.      Data     I have personally reviewed the following data over the past 24 hrs:    10.5  \   6.9 (LL)   / 307     140 108 (H) 46.5 (H) /  98   3.9 21 (L) 3.93 (H) \       ALT: 12 AST: 9 AP: 103 TBILI: <0.2   ALB: 2.4 (L); 2.4 (L) TOT PROTEIN: 5.3 (L) LIPASE: N/A       Imaging results reviewed over the past 24 hrs:   No results found for this or any previous visit (from the past 24 hour(s)).

## 2023-07-06 NOTE — PROGRESS NOTES
"CLINICAL NUTRITION SERVICES - REASSESSMENT NOTE     Nutrition Prescription    RECOMMENDATIONS FOR MDs/PROVIDERS TO ORDER:  Please send nutrition consult for \"Registered Dietitian to Assess & Order TF (per Medical Nutrition Therapy protocol)\" if desire RD to enter TF orders.    Malnutrition Status:    Severe malnutrition in the context of acute on chronic illness    Recommendations already ordered by Registered Dietitian (RD):  Discussed pt with team.   Continue current vitamin supplements  Discontinuing Glucerna, pt has excess stock in room.    Future/Additional Recommendations:  Monitor labs, intakes, and weight trends.  Monitor GOC and plan if pt continues with AMS and poor intakes.  --If EN becomes POC:  --Enteral access: NGT  --Pending AXR confirmation of feeding tube position  --GOAL: Osmolite 1.5 Dev (or equivalent) @ goal of  55ml/hr  (1320ml/day) provides: 1980 kcals, 83 g PRO, 1005 ml free H20, 268 g CHO, and 0 g fiber daily. + 1 pkt prosource TF 20 = 2060 kcal (24 kcal/kg) and 103 g protein (1.2 g/kg).  --Start TF @ 15 ml/hr and advance by 10 ml q 8 hrs until goal rate.  --Do not start or advance TF rate unless K+ >3.0, Mg++ > 1.5,  and Phos > 1.9.  --Minimum 30 ml q 4 hrs water flushes for tube patency.  --If gastric enteral access: HOB > 30 degrees or Reverse Trendelenburg >10-25 degrees     EVALUATION OF THE PROGRESS TOWARD GOALS   Diet: Regular  Intake/Tolernace: 0 - 100% of documented meals. Poorly documented in flowsheets over last week.   Snacks/supplements: Glucerna with breakfast     NEW FINDINGS/REVIEW OF SYSTEMS    Nutrition/GI: Per discussion with MD, and per RN notes, pt with large decline in PO intake over last couple days despite encouragement and non-select tray orders. Spoke with RN regarding pt status. RN states she will continue to offer food and encourage intakes. RD met with pt in room, patient with expressive aphagia, unable to even nod/shake head or say yes or no. Twitching every few " seconds. Explained that plan is to ensure pt is fed well if that is his wish. Noted pt with multiple Glucernas in room.    Weights: Pt with limited weight history prior to admission with 81 lb (25%) weight loss over 6 months and subsequent 34 lb (14%) weight gain in 1 month during admission likely fluid related.   Wt Readings from Last Encounters:   06/28/23 126 kg (277 lb 12.5 oz)   06/16/23 115.2 kg (254 lb)   05/26/23 110.4 kg (243 lb 4.8 oz)   05/19/23 110.4 kg (243 lb 4.8 oz)   03/23/23 117 kg (257 lb 14.4 oz)   01/09/23 119.7 kg (264 lb)   11/22/22 147.1 kg (324 lb 4.8 oz)   11/14/22 142.9 kg (315 lb)   11/09/22 146.8 kg (323 lb 9.6 oz)   05/19/22 136.1 kg (300 lb)   07/03/18 148.9 kg (328 lb 4.8 oz)     Skin: L hip incision, buttocks and groin wound, pt sometimes refusing repositioning and refusing assessments per RN notes     Labs reviewed   07/02/23 11:49 07/03/23 11:38 07/04/23 06:27 07/05/23 07:00 07/05/23 09:31 07/06/23 07:13   Sodium 137 140 138 142 141 140   Potassium 4.3 4.2 4.2 4.5 4.3 3.9   Urea Nitrogen 39.1 (H) 39.0 (H) 41.7 (H) 46.3 (H) 45.9 (H) 46.5 (H)   Creatinine 2.20 (H) 2.51 (H) 2.91 (H) 3.30 (H) 3.36 (H) 3.93 (H)   GFR Estimate 31 (L) 27 (L) 22 (L) 19 (L) 19 (L) 16 (L)   Magnesium 1.7   1.7     Phosphorus      4.6 (H)   Albumin   2.6 (L)   2.4 (L)   Alkaline Phosphatase   99      ALT   14      AST   11      Ammonia   15 (L)      Bilirubin Total   0.2      CRP Inflammation   42.85 (H)      Glucose 128 (H) 120 (H) 108 (H) 99 90 108 (H)      Medications reviewed: IV fluids, cefepime ceftazidime, ferrous sulfate, folic acid, lasix, insulin, culturell, multivitamin, miralax last given 7/4, senna last given 7/4, vancomycin last given 7/2    MALNUTRITION  % Intake: </= 50% for >/= 5 days (severe)  % Weight Loss: > 10% in 6 months (severe) - previous  Subcutaneous Fat Loss: Upper arm:  Moderate  Muscle Loss: Temporal: mooderate, Thoracic region (clavicle, acromium bone, deltoid, trapezius,  pectoral):  Moderate-svere  Fluid Accumulation/Edema: Trace - mild per flowhseets  Malnutrition Diagnosis: Severe malnutrition in the context of acute on chronic illness    Previous Goals   Patient to consume % of nutritionally adequate meal trays TID, or the equivalent with supplements/snacks.  Evaluation: Not met    Previous Nutrition Diagnosis  Inadequate oral intake related to poor appetite, dislike of hospital foods as evidenced by patient report and documented intakes    Evaluation: Updated    CURRENT NUTRITION DIAGNOSIS  Inadequate oral intake related to poor appetite, altered mental status, as evidenced by RN report and documented intakes.    INTERVENTIONS  Implementation  Collaboration with other providers - MD, RN    Goals  Patient to consume % of nutritionally adequate meal trays TID, or the equivalent with supplements/snacks.    Monitoring/Evaluation  Progress toward goals will be monitored and evaluated per protocol.    Dee Alanis MS, RDN, LDN  RD pager: [phone]   Weekend/Holiday Pager: [phone]

## 2023-07-07 NOTE — PLAN OF CARE
Xcover paged   D.L - 5 ORTHO  FYI hgb 6.9 8AM, delayed transfusion d/t pt refusing type and screen. Would you like recheck before transfusion now? Thanks Rice'ad #[id number]    - Pt has been declining cares all evening, even sitting in soiled brief for hours, refusing offered pain meds as pre-medication for activity. More agreeable to cares, incont cares completed, pt ready for transfusion.    Response: OK to transfuse now, Recheck hgb 4hrs after transfusion is complete.

## 2023-07-07 NOTE — PHARMACY-VANCOMYCIN DOSING SERVICE
Pharmacy Vancomycin Note  Date of Service 2023  Patient's  1950   72 year old, male    Indication: Bacteremia  Day of Therapy: started 6/15   Current vancomycin regimen: intermittent dosing (last received 1500 mg x1 on )   Current vancomycin monitoring method: Trough (Method 2 = manual dose calculation)  Current vancomycin therapeutic monitoring goal: 15-20 mg/L    Current estimated CrCl = Estimated Creatinine Clearance: 22.6 mL/min (A) (based on SCr of 3.99 mg/dL (H)).    Creatinine for last 3 days  2023:  7:00 AM Creatinine 3.30 mg/dL;  9:31 AM Creatinine 3.36 mg/dL  2023:  7:13 AM Creatinine 3.93 mg/dL;  5:08 PM Creatinine 4.05 mg/dL  2023: 12:51 PM Creatinine 3.99 mg/dL    Recent Vancomycin Levels (past 3 days)  2023:  7:00 AM Vancomycin 25.7 ug/mL  2023:  7:13 AM Vancomycin 22.8 ug/mL  2023: 12:51 PM Vancomycin 20.1 ug/mL    Vancomycin IV Administrations (past 72 hours)      No vancomycin orders with administrations in past 72 hours.                Nephrotoxins and other renal medications (From now, onward)    Start     Dose/Rate Route Frequency Ordered Stop    23 0800  [Held by provider]  furosemide (LASIX) tablet 60 mg        (Held by provider since 2023 at 0943 by Hector Burnham MD.Hold Reason: Acute Kidney Injury)    60 mg Oral DAILY 23 1538      23 0813  vancomycin place montilla - receiving intermittent dosing         1 each Intravenous SEE ADMIN INSTRUCTIONS 23 08               Contrast Orders - past 72 hours (72h ago, onward)    None          Interpretation of levels and current regimen:  Vancomycin level is reflective of therapeutic level    Has serum creatinine changed greater than 50% in last 72 hours: No    Urine output:  unable to determine    Renal Function: Stable    Plan:  1. Will give vancomycin 1500 mg x1  2. Vancomycin monitoring method: Trough (Method 2 = manual dose calculation)  3. Vancomycin therapeutic  monitoring goal: 15-20 mg/L  4. Pharmacy will check vancomycin levels as appropriate in 1-3 Days.  5. Serum creatinine levels will be ordered daily.    NILSON DAILY RPH

## 2023-07-07 NOTE — PROGRESS NOTES
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  Postop course complicated by profound deconditioning.  He was admitted to TCU initially on 12/23/2022 for physical rehabilitation, however therapies no longer worked with patient after several months of an in bed therapy and his refusals to mobilize.  Underwent biopsy on 4/7/2023 without evidence of infection.  He was transferred back to [facility] for left total hip arthroplasty and reimplantation on 5/26/23 with Dr. Meyers.  Presented again to [place] for persistent MRSA bacteremia on 6/17.     Brief Hospital Summary:    Pt was admitted to continue infectious workup with MRSA bacteremia.  Admission blood cultures were positive, ID and Ortho were consulted. KEILA was obtained on 6/19, which was negative for endocarditis, ortho took for washout/debridement of hip on 6/21. ID recommending 6 weeks abx (6/21-8/1), patient recovering from HALEY, but given history of HALEY's and need for long term vanc plus comorbids making declining kidney function in the future probable nephrology doesn't feel comfortable with patient getting a PICC -- Tunneled internal jugular line placed. Transitioned cefepime to cipro back to cefepime given HALEY and prolonged qtc, continue vancomycin.     Plan for today:  - nephrology following closely - awaiting daytime labs, strict I/O, condom cath, MIVF, hold diuresis  - appreciate neurology consultation - MS has improved somewhat today, less myoclonus  - off Cefepime since 7/4 in case that  is contributing to mental status changes.  Ceftazidime and vanc to continue.   - low dose (2.5 mg) oxy with position changes/therapy  - appreciate RD consult, pending GOC discussion with palliative, could consider EN  - palliative consult for GOC  - thiamine per neurology    AMS due to Delirium from illness/hospitalization and possible toxic metabolic encephalopathy  - medications reduced as able, off cefepime since 7/4 - suspect this was primary  of worsening mental status  - ? Uremia in setting of worsening renal failure and new myoclonus.  Nephrology following  - CT head, ammonia, vbg reassuring.    - neurology consulted 7/6  - thiamine per neuro    MRSA bacteremia due to possible left hip septic arthritis vs other source Blood cultures: 6/14, 6/15 + for MRSA (4/4), and 6/16 (1/2 GPCs) and 6/17 (2/2) GPCs. - started vancomycin 6/15.   6/17 sinovial fluid cultures with staph and klebsiella. Purulent drainage noted at incision site at TCU, CT left hip concerning for infection (no mention of joint, mostly anterior in soft tissue) and couldn't exclude abscess/inf  - Continue vancomycin, added rocephin 6/21 and changed to cefepime 6/23, ceftazidime 7/4; ID recommends 6 weeks IV vanc (last day 8/1/23) and can transition to oral cipro 750 mg BID at discharge, needs weekly vanc level, cbcd, cmp, crp while on IV abx, regular ECG monitoring of QTc on high dose cipro --> follow up outpatient with ID prior to discontinuing antibiotics (needs appt end of July)  - blood cultures negative 6/19 and 6/20 (last + was 6/17), trend CRP q48h (way down since initiation of treatment and washout)  - intraop cultures with Staph, Pseudomonas, and C acnes    Acute Kidney Injury - multifactorial, see below   Nephrology consulted, appreciate recs.   - baseline creatinine 1.0 or 1.1  - etiology: suspect combo of sepsis/inflammation, hypovolemia, blood loss anemia following surgery, vancomycin  - volume (hypovolemia/congestion):  euvolemic but tough to assess based on habitus  - nephrotoxins: vancomycin  - diabetes: yes; last A1C < 6, though  - sepsis: resolved now  - anemia: yes, ongoing  - cardiac performance: normal based on KEILA from 6/19  - inflammation: from surgery and infection  - creatinine downtrending; neph ordered C3 and 4, normal    Bilateral LE edema due to volume overload from HALEY and iatrogenic from blood transfusions  - HOLDING diuretics as above    Hypoxemic Respiratory Failure due to immobility, surgery - resolved (sena)  - patient with fluctuating oxygen requirement but off O2 since 6/23  - continue to follow; encourage IS    Acute blood loss anemia following surgery, complicated by use of blood thinner  - got 3 units day after surgery did joint washout (6/22) for acute blood loss anemia, 1 unit 6/25  - watch for bleeding; hold apixaban if need be - had unprovoked dvt in 2018 and would be on eliquis for life probably, at really high risk of recurrent DVT given recent procedures and illness/immobility  - iron and folic acid daily    S/p left hip explantation of left hip antibiotic spacer and revision of left total hip arthroplasty (5/26/2023)  - ortho performed washout 6/21; drain to come out in 2 weeks  -Activity: 50% PWB LLE with posterior hip precautions [id number]  -AFO ordered for foot drop but patient refusing to use  -PT/OT  -Knee to be kept even in a foam leg elevator to relax sciatic nerve  -Pain: holding robaxin 4 times a day with MS changes, APAP PRN                 -oxycodone  to 2.5 mg                  -tramadol 25-50 mg every 6 hours PRN    Hx of unprovoked DVT in 2018   - on eliquis 5 mg bid     Type 2 diabetes mellitus  - Last hemoglobin A1c 5.6% on 6/16/2023.  On glipizide, metformin, Actos, and Victoza prior to hospitalizations and surgery.   - continue ISS    Constipation  -Continue Colace 100 mg twice daily  -Continue senna 1 tablet twice daily  -Continue MiraLAX daily     Hypomagnesemia  -monitor and  "replete      Severe malnutrition in the context of acute on chronic illness  Goal for patient is to consume % of meals TID. Goal is 1456-8904 kcals/daily. Protein needs- 100-126 grams/daily.   -RD consult and appreciate recs  -regular diet  -Weekly weights, daily I/O     Adjustment disorder with depressed mood  Prolonged grief disorder  Patient has been with depressed mood in setting of ongoing health issues, as evidenced by lack of motivation to work with therapies during both TCU admissions.  Past history of passive suicidal statements. Psychiatry saw patient during hospital admission on 5/31 due to lack of motivation with therapies. Patient declined interest in starting any psychiatric medications. Was willing to engage with health psychology.   -Outpatient health psychology consult  -Continue to monitor for willingness to start antidepressant therapy     Stable and Resolved Issues:  Hyperlipidemia-continue PTA Lipitor 40 mg daily  JAIR-CPAP when sleeping     Diet: Regular Diet Adult  Room Service    DVT Prophylaxis: DOAC  Tran Catheter: Not present  Lines: PRESENT      CVC Single Lumen Right Internal jugular Non - valved (open ended);Tunneled;Power injectable-Site Assessment: WDL      Cardiac Monitoring: None  Code Status: Full Code      Clinically Significant Risk Factors              # Hypoalbuminemia: Lowest albumin = 2.2 g/dL at 6/26/2023  5:13 AM, will monitor as appropriate    # Acute Kidney Injury, unspecified: based on a >150% or 0.3 mg/dL increase in last creatinine compared to past 90 day average, will monitor renal function         # Obesity: Estimated body mass index is 39.86 kg/m  as calculated from the following:    Height as of this encounter: 1.778 m (5' 10\").    Weight as of this encounter: 126 kg (277 lb 12.5 oz).   # Severe Malnutrition: based on nutrition assessment         Disposition Plan           Hector Burnham MD  Hospitalist Service, GOLD TEAM 21  [facility] " [facility]  Securely message with eBuilder (more info)  Text page via [facility] Paging/Directory   See signed in provider for up to date coverage information  ______________________________________________________________________    Interval History   Improving alertness/confusion, at least somewhat interested in food. Intermittently following commands, answering occasional questions.    Physical Exam   Vital Signs: Temp: 97.7  F (36.5  C) Temp src: Oral BP: 118/43 Pulse: 67   Resp: 16 SpO2: 99 % O2 Device: None (Room air)    Weight: 277 lbs 12.47 oz    Constitutional: sitting in bed  CV: regular rate and rhythm, no murmurs rubs or gallops  Resp: crackles to auscultation bilaterally  Abd: soft, non-tender, non-distended, no hepatosplenomegaly or masses palpated, hernia reducible   Neuro: alert, disoriented. CN grossly intact, WEEKS. Reduced myoclonus    Medical Decision Making       50 MINUTES SPENT BY ME on the date of service doing chart review, history, exam, documentation & further activities per the note.      Data     I have personally reviewed the following data over the past 24 hrs:    9.4  \   7.7 (L)   / 310     137 106 47.3 (H) /  105 (H)   4.0 20 (L) 4.05 (H) \       ALT: N/A AST: N/A AP: N/A TBILI: N/A   ALB: 2.5 (L) TOT PROTEIN: N/A LIPASE: N/A       Imaging results reviewed over the past 24 hrs:   No results found for this or any previous visit (from the past 24 hour(s)).

## 2023-07-07 NOTE — PROGRESS NOTES
Nephrology Progress Note  07/07/2023         Assessment & Recommendations:   A/Rec: 71yo M with persistent prosthetic joint infection of L hip admitted for MRSA bacteremia with recurrent HALEY.     #HALEY - Cr improved to 1.4 on 6/26 but has since risen to 4.05 yesterday, no labs today, likely secondary to hemodynamic instability and overdiuresis. Continued exposure to vanc could be contributing.   -holding diuretic  -No indications for RRT yet, will continue to monitor closely    - recommend daily vanc levels  - transfer to intermediate care unit  - labs this afternoon if agreeable  - palliative consulted for goals of care conference       #Volume overload - resolved  - UOP incontinent, unable to measure completely but seems to be making more urine  - continue condom catheter as he would likely pull billy catheter out.   -- attempt bladder scan every 8 ours.   -- recommend maintenance fluids as long as patient is refusing to eat and drink  -management as per above    #Electrolytes  Refused labs today  - Na 140, K 3.9 no acute issues  -CO2 21 relatively high in the setting of severe renal failure signaling likely underlying chloride depletion alkalosis    Recommendations were communicated to primary team via this note and verbally    Discussed with Dr. Jennifer MAYER, PAKING     Interval History :   Reviewed provider and nursing notes for past 24 hours. Had CT head due to increasing confusion - negative acute. Neuro is consulted and EEG is planned. Cefepime stopped, Recent Vanc level 22.8. Has developed myoclonus. He is more alert today then yesterday and is somewhat interested in food. He refused lab draw this morning.      Review of Systems:   Unable to obtain.     Physical Exam:   No intake/output data recorded.  Vitals: /43  Pulse 67  Resp 16  SpO2 99%  Temp 97.7  GENERAL APPEARANCE: awake, no distress  EYES:  no scleral icterus  PULM: no respiratory distress   CV: regular rhythm, normal  rate  Extremities: 2+ LE edema  NEURO: no myoclonus observed today      Labs:   All labs reviewed by me  Electrolytes/Renal -   Recent Labs   Lab Test 07/07/23  1256 07/07/23  0752 07/07/23  0633 07/06/23  1737 07/06/23  1708 07/06/23  0750 07/06/23  0713 07/05/23  1728 07/05/23  0931 07/05/23  0823 07/05/23  0700 07/02/23  1754 07/02/23  1149 07/01/23 2208 07/01/23 2007 07/01/23  1106 07/01/23  1048   NA  --   --   --   --  137  --  140  --  141  --  142   < > 137  --   --   --  138   POTASSIUM  --   --   --   --  4.0  --  3.9  --  4.3  --  4.5   < > 4.3  --   --   --  4.4   CHLORIDE  --   --   --   --  106  --  108*  --  109*  --  108*   < > 107  --   --   --  106   CO2  --   --   --   --  20*  --  21*  --  19*  --  16*   < > 21*  --   --   --  23   BUN  --   --   --   --  47.3*  --  46.5*  --  45.9*  --  46.3*   < > 39.1*  --   --   --  38.5*   CR  --   --   --   --  4.05*  --  3.93*  --  3.36*  --  3.30*   < > 2.20*  --   --   --  2.18*   * 90 96   < > 105*   < > 108*   < > 90   < > 99   < > 128*   < >  --    < > 151*   MAIKOL  --   --   --   --  7.7*  --  7.8*  --  7.9*  --  7.7*   < > 7.9*  --   --   --  7.8*   MAG  --   --   --   --   --   --   --   --   --   --  1.7  --  1.7  --  1.5*  --  1.4*   PHOS  --   --   --   --  4.5  --  4.6*  --   --   --   --   --   --   --   --   --  3.7    < > = values in this interval not displayed.       CBC -   Recent Labs   Lab Test 07/07/23  1251 07/06/23  0713 07/05/23  0934   WBC 9.4 10.5 9.9   HGB 7.7* 6.9* 7.2*    307 318       LFTs -   Recent Labs   Lab Test 07/06/23  1708 07/06/23  0713 07/04/23  0627 06/22/23  0711 06/20/23  0704   ALKPHOS  --  103 99  --  93   BILITOTAL  --  <0.2 0.2  --  0.2   ALT  --  12 14  --  14   AST  --  9 11  --  14   PROTTOTAL  --  5.3* 5.9*  --  5.9*   ALBUMIN 2.5* 2.4*  2.4* 2.6*   < > 2.3*    < > = values in this interval not displayed.       Iron Panel -   Recent Labs   Lab Test 06/26/23  0513   IRON 30*   IRONSAT 21   FADI  2,616*         Imaging:  Reviewed     Current Medications:    apixaban ANTICOAGULANT  5 mg Oral BID     atorvastatin  40 mg Oral Daily     cefTAZidime  2 g Intravenous Q24H     ferrous sulfate  325 mg Oral Every Other Day     folic acid  1 mg Oral Daily     [Held by provider] furosemide  60 mg Oral Daily     heparin lock flush  5-20 mL Intracatheter Q24H     insulin aspart  1-7 Units Subcutaneous TID AC     insulin aspart  1-5 Units Subcutaneous At Bedtime     lactobacillus rhamnosus (GG)  1 capsule Oral BID     [Held by provider] methocarbamol  500 mg Oral 4x Daily     miconazole   Topical BID     multivitamin w/minerals  1 tablet Oral Daily     polyethylene glycol  17 g Oral BID     senna-docusate  1 tablet Oral BID     sodium chloride (PF)  10 mL Intracatheter Q8H     sodium chloride (PF)  10-40 mL Intracatheter Q8H     sodium chloride         thiamine  500 mg Intravenous Daily     vancomycin place montilla - receiving intermittent dosing  1 each Intravenous See Admin Instructions       sodium chloride 75 mL/hr at 07/07/23 0610     LENIN BLANKENSHIPC

## 2023-07-07 NOTE — PLAN OF CARE
"[id number]  /53   Pulse 68   Temp 97.7  F (36.5  C) (Oral)   Resp 16   Ht 1.778 m (5' 10\")   Wt 126 kg (277 lb 12.5 oz)   SpO2 98%   BMI 39.86 kg/m       AOx1, only able to state name.  Word finding difficulty, slow responses.  Pt has been able to carry long conversations on the phone, however not with Writer during assessments. Refusing cares in the evening.  Eventually accepted meds and cares after 2300.     NOOB, lift.  A3 with cares.  Refusing repositioning, needing lots of encouragement with activity.  Incont B/B, condom cath unsuccessful.  PVR overnight 175mL. LBM 7/7, loose.  Sched stool softeners held.     L hip dressing CDI, partially changed by Ortho this AM.  Skin breakdown and redness to groin, bilat thighs, R abd fold- tender to touch. Edema BLE.     Refusing PO intake, encouraging sips of fluids with interactions. BG checks.     R chest CVC SL infusing MIVF NS @ 75mL/hr btwn ABX.     1u PRBC transfused overnight, tolerated well.  Hgb recheck scheduled for ~0900.     RN manage protocol, K+ WNL.      Needs anticipated.  Bed alarm on, freq rounding.     Addendum - pt refused AM labs, oncoming RNs informed.    "

## 2023-07-07 NOTE — PLAN OF CARE
"VS: /43 (BP Location: Left arm)   Pulse 67   Temp 97.7  F (36.5  C) (Oral)   Resp 16   Ht 1.778 m (5' 10\")   Wt 126 kg (277 lb 12.5 oz)   SpO2 99%   BMI 39.86 kg/m     O2: O2> 95% ORA, denies feeling SOB, lightheadesness  Lungs clear, equal bilateral    Output: Incontinent, attempted to void using bedside urinal - strains to void, frequently checks    Last BM: 7/7   Activity: Ax2-3 with lift device and bed cares  Attempted to work w/ therapy, Ax2 w/ lift  Tremors present - make it difficult for pt to grasp things like urinal and to eat    Up for meals? Sits up for meals   Skin: L hip incision. Scattered bruising to BUE, swelling to BLE - refuses for anyone to touch L foot  Redness to outer groin areas - barrier cream applied  R hip redness - soap and water, dried, barrier cream applied   Pain: Managed with PRN Tylenol and Tramadol.  Robaxin discontinued by provider   CMS: LLE numbness due to foot drop per pt report. A&O x3-4, disoriented to time, at times   Reports feeling \"fuzzy headed\" has a hard time finding words, slow clear, speech.  Oriented to self, situation, place, flat affect.    Dressing: LLE dressing CDI  Mepilex on coccyx CDI   Diet: Reg, denies nausea/vomiting   On glucose checks before meals   Needs assistance w/ ordering meals and set up and encouragement to eat     LDA: R CVC infusing NS 75ml/hr   Plan: Continue POC for pain management and intermittent antibiotics - continue frequent checks for incontinence    Additional Info: On RN managed potassium - did not have to be replaced today   \recheck ordered for 7/8 AM       "

## 2023-07-07 NOTE — PROGRESS NOTES
Addendum 7/7, 6pm  I reviewed Rahat's case with Dr. Meyers, including images. The lytic lesion in the L pelvis appears stable, does not appear to communicate with the L hip joint. Additionally, the post-surgical drain that was in place presumably from 5/27 until 6/17 was not in the setting of an unexpected quantity of fluid drainage from the 5/27 2nd stage revision.   The potential nidus for infection with new organisms after what appears to have been a successful 2nd stage revision for prior L BELLA PJI in 11/2022 due to Proteus, Porphyromonous and Finegoldia remains unclear, but could be related to the indwelling drain serving as an entry point.            General ID Service [facility]: Follow Up Note      Patient:  Waldo Bustos, Date of birth 1950, Medical record number [id number]  Date of Visit:  July 7 2023         Assessment and Recommendations:   Assessment:  1. MRSA bacteremia, suspected secondary to left hip PJI and associated soft tissue infection; KEILA negative for IE   -6/14 blood cultures: 4 of 4 bottles positive  -6/15 blood cultures: 4 of 4 bottles positive  -6/17 blood cultures: 2 of 4 bottles positive  -6/19 NGTD  -6/20 NGTD  2. Left hip PJI, prior episodes dating to 2018 with C acnes, most recent  dx in 11/2022 with cultures showing Proteus, Porphyromonous, and Finegoldia, s/p abx and interval synovial sampling on 4/7/23 with 13K nucleated cells (65%N) and no culture growth, ultimately s/p 2 stage revision with re-implantation on 5/26/23 with negative cultures  L hip was re-sampled on 6/17 (from pre-existing drain) in the setting of MRSA bacteremia, cx with MRSA, Corynebacterium, K pneumoniae; Ultimately I&D on 6/21/23 without liner exchange, operative appearance was somewhat surprisingly reassuring with no purulence, intraop cultures: MRSA, C.acnes, and Pseudomonas  3. DM2  4. RA  5. Venous insufficiency  6. QTC = 421 -> 490 - > 492, I ordered a repeat  7. HALEY, onset around 6/21, attributed to  elevated vanco trough, hemodynamics, sepsis due to MRSA bacteremia  8. Delirium, likely cefepime neurotoxicity in the setting of HALEY  9. Lytic lesion in the medial L ilium, measuring 3.6x5.6 cm, present since 2018    Recommendations:    --HOLD Cefepime for now  --Continue Ceftazidime 2 gram Q8h  --if he remains so delirious, I would consider changing classes of antibiotics an case it is contributing  -- Continue IV vancomycin (pharmacy to dose)    - Anticipate an initial 6 weeks of IV antibiotic therapy, followed by likely transition to oral antibiotics   - Given polymicrobial infection and multiple potential drug-drug interactions, will treat without adjunctive rifampin at this time   - Long-term antibiotic plan to be determined in outpatient ID follow up   - Weekly CBC w/diff, CMP, CRP, and vancomycin level while in IV therapy        Discussion:  Pt who was followed by ID team at  (last by Johnny Mercado and Shira on [facility]), transferred to [facility] for KEILA, now back to [facility] on 6/20/23.      73yo M history of chronic left hip PJI, most recently s/p revision left BELLA on 5/26, who had been in TCU since discharge on 6/2, but then developed MRSA bacteremia with confusion and somnolence. Presumed hip infection and they obtained cultures from drain on 6/17/2023 - showing MRSA, Klebsiella, and Corynebacterium. Patient taken to OR for DAIR with Dr. Meyers on 6/21 - operative relatively reassuring, but entire picture certainly suspicious for infection.    What is unusual here is the number of cultured species. This could be related to the chronicity of his PJI, though he underwent definitive management with 2-stage revision with reimplantation cultures negative. Also could be related to prolonged presence of drain presumably from 5/26 until at least 6/17 when it was cultured -  this seems longer than tyipcal and makes me wonder what process resulted in persistent fluid production necessitating a prolonged surgical  drain. He has a stable lytic lesion in his L ilium - could this be at all an anatomic predisposition (eg, sequestrum) or representative of a systemic process (eg myeloma)?    Given confusion on 7/4 and rising Cr cefepime was held due to concern for cefepime neurotoxicity, which this picture is consistent with. Carbepenems are more likely to have confusion. Fluroquinolones are also possible if QTc is wnl but they also have some concern for confusion. I will continue ceftazidime today and get an EKG. If the QTc is improved we can consider using a flouroquinolone.     It is a pleasure to participate in Don's care. Floor time =75 min. Discussed with Dr. Burnham. ID will continue to follow - Dr. Gypsy Moon will be available this weekend if there are questions; Dr. Vani Prince will assume care on 7/10.    Pat Mercer MD   of Medicine, Division of Infectious Diseases  Contact me on the [employer] meka or console  pgr [phone]          Interval History:   Afebrile. Per other providers, slightly improved today         Physical Exam:   Ranges for vital signs:  Temp:  [96.1  F (35.6  C)-98.6  F (37  C)] 97.7  F (36.5  C)  Pulse:  [57-79] 67  Resp:  [15-16] 16  BP: (115-155)/(43-62) 118/43  SpO2:  [92 %-99 %] 99 %    Intake/Output Summary (Last 24 hours) at 6/16/2023 1115  Last data filed at 6/15/2023 2239  Gross per 24 hour   Intake 50 ml   Output --   Net 50 ml     Exam:  GENERAL: Well-developed, well-nourished. Awake and alert. But seems confused. Slow speech.   ENT:  Head is normocephalic, atraumatic.   EYES:  Eyes have anicteric sclerae.    LUNGS:  Breathing comfortably  EXT: Extremities warm and without edema. Left hip with GUERRERO drain in place, serosanguinous fluid in line and collection vessel.  SKIN:  No acute rashes. Surgical site with no obvious erythema or purulence.  NEUROLOGIC:  Grossly nonfocal, though confused and somnolent         Laboratory Data:   Reviewed.  Pertinent  for:    Microbiology:  Culture   Date Value Ref Range Status   06/21/2023 1+ Cutibacterium (Propionibacterium) acnes (A)  Final     Comment:     Susceptibility testing of Cutibacterium (Propionibacterium) species is not done from this source. This organism is susceptible to penicillin and cefotaxime and most are susceptible to clindamycin.   06/21/2023 No anaerobic organisms isolated  Final   06/21/2023 No anaerobic organisms isolated  Final   06/21/2023 No anaerobic organisms isolated  Final   06/21/2023 1+ Staphylococcus aureus (A)  Final     Comment:     Susceptibilities done on previous cultures   06/21/2023 1+ Pseudomonas aeruginosa (A)  Final   06/21/2023 1+ Staphylococcus aureus MRSA (A)  Final   06/21/2023 2+ Staphylococcus aureus (A)  Final     Comment:     Susceptibilities done on previous cultures   06/21/2023 1+ Pseudomonas aeruginosa (A)  Final     Comment:     Susceptibilities done on previous cultures   06/21/2023 1+ Staphylococcus aureus (A)  Final     Comment:     Susceptibilities done on previous cultures   06/21/2023 2+ Staphylococcus aureus (A)  Final     Comment:     Susceptibilities done on previous cultures   06/21/2023 No anaerobic organisms isolated  Final   06/21/2023 2+ Staphylococcus aureus MRSA (A)  Final   06/20/2023 No Growth  Final   06/20/2023 No Growth  Final   06/19/2023 No Growth  Final   06/19/2023 No Growth  Final   06/17/2023 4+ Staphylococcus aureus MRSA (A)  Final   06/17/2023 2+ Corynebacterium striatum (A)  Final     Comment:     Identification obtained by MALDI-TOF mass spectrometry research use only database. Test characteristics determined and verified by the Infectious Diseases Diagnostic Laboratory.   06/17/2023 1+ Klebsiella pneumoniae (A)  Final   06/17/2023 No anaerobic organisms isolated  Final   06/17/2023 Positive on the 1st day of incubation (A)  Final   06/17/2023 Staphylococcus aureus MRSA (AA)  Final     Comment:     1 of 2 bottlesSusceptibilities done on  previous cultures   06/17/2023 Positive on the 1st day of incubation (A)  Final   06/17/2023 Staphylococcus aureus MRSA (AA)  Final     Comment:     1 of 2 bottlesSusceptibilities done on previous cultures   06/17/2023 <10,000 CFU/mL Urogenital marimar  Final   06/16/2023 Positive on the 1st day of incubation (A)  Final   06/16/2023 Staphylococcus aureus MRSA (AA)  Final     Comment:     1 of 2 bottlesSusceptibilities done on previous cultures   06/16/2023 Positive on the 1st day of incubation (A)  Final   06/16/2023 Staphylococcus aureus MRSA (AA)  Final     Comment:     1 of 2 bottlesSusceptibilities done on previous cultures   06/15/2023 Positive on the 1st day of incubation (A)  Final   06/15/2023 Staphylococcus aureus MRSA (AA)  Final     Comment:     2 of 2 bottlesSusceptibilities done on previous cultures   06/15/2023 Positive on the 1st day of incubation (A)  Final   06/15/2023 Staphylococcus aureus MRSA (AA)  Final     Comment:     2 of 2 bottlesSusceptibilities done on previous cultures   06/14/2023 Positive on the 1st day of incubation (A)  Final   06/14/2023 Staphylococcus aureus MRSA (AA)  Final     Comment:     2 of 2 bottlesSusceptibilities done on previous cultures   06/14/2023 Positive on the 1st day of incubation (A)  Final   06/14/2023 Staphylococcus aureus MRSA (AA)  Final     Comment:     2 of 2 bottles     05/26/2023 No anaerobic organisms isolated  Final   05/26/2023 No anaerobic organisms isolated  Final   05/26/2023 No anaerobic organisms isolated  Final   05/26/2023 No anaerobic organisms isolated  Final   05/26/2023 No Growth  Final   05/26/2023 No Growth  Final   05/26/2023 No Growth  Final   05/26/2023 No Growth  Final   05/26/2023 No anaerobic organisms isolated  Final   05/26/2023 No Growth  Final   04/07/2023 No anaerobic organisms isolated  Final   04/07/2023 No Growth  Final   04/07/2023 No anaerobic organisms isolated  Final   04/07/2023 No Growth  Final   04/07/2023 No anaerobic  organisms isolated  Final   04/07/2023 No Growth  Final   04/07/2023 No anaerobic organisms isolated  Final   04/07/2023 No Growth  Final   04/07/2023 No anaerobic organisms isolated  Final   04/07/2023 No Growth  Final   04/07/2023 No anaerobic organisms isolated  Final   04/07/2023 No Growth  Final   11/22/2022 4+ Finegoldia magna (A)  Final     Comment:     Susceptibilities done on previous cultures   11/22/2022 No Growth  Final   11/22/2022 1+ Proteus mirabilis (A)  Final     Comment:     Susceptibilities done on previous cultures   11/22/2022 1+ Finegoldia magna (A)  Final     Comment:     Susceptibilities done on previous cultures   11/22/2022 No Growth  Final   11/22/2022 1+ Proteus mirabilis (A)  Final   11/22/2022 No anaerobic organisms isolated  Final   11/22/2022 No anaerobic organisms isolated  Final   11/22/2022 No anaerobic organisms isolated  Final   11/22/2022 No Growth  Final   11/22/2022 No Growth  Final   11/22/2022 No Growth  Final   11/22/2022 Isolated in broth only Proteus mirabilis (A)  Final     Comment:     On day 1 of incubationSusceptibilities done on previous cultures   11/22/2022 Isolated in broth only Proteus mirabilis (A)  Final     Comment:     On day 1 of incubationSusceptibilities done on previous cultures   11/22/2022 1+ Proteus mirabilis (A)  Final   11/22/2022 No anaerobic organisms isolated  Final   11/22/2022 No Growth  Final   11/22/2022 1+ Proteus mirabilis (A)  Final     Comment:     Susceptibilities done on previous cultures   11/14/2022 4+ Porphyromonas species (A)  Final     Comment:     Beta Lactamase Positive  Identification obtained by MALDI-TOF mass spectrometry research use only database. Test characteristics determined and verified by the Infectious Diseases Diagnostic Laboratory.   11/14/2022 1+ Finegoldia magna (A)  Corrected   11/14/2022 3+ Proteus mirabilis (A)  Final   11/14/2022 No Growth  Final     Metabolic Studies       Recent Labs   Lab Test 07/07/23  1252  07/07/23  0752 07/07/23  0633 07/07/23  0135 07/06/23  2135 07/06/23  1737 07/06/23  1708 07/06/23  0750 07/06/23  0713 07/05/23  1728 07/05/23  0931 07/05/23  0823 07/05/23  0700 07/04/23  1230 07/04/23  0627 07/03/23  1346 07/03/23  1138 07/02/23  1754 07/02/23  1149 06/16/23  0820 06/16/23  0605 03/21/23  0810 03/20/23  1919 01/10/23  1710 01/10/23  1105   NA  --   --   --   --   --   --  137  --  140  --  141  --  142  --  138  --  140  --  137   < > 141   < >  --    < >  --    POTASSIUM  --   --   --   --   --   --  4.0  --  3.9  --  4.3  --  4.5  --  4.2  --  4.2  --  4.3   < > 4.2   < >  --    < >  --    CHLORIDE  --   --   --   --   --   --  106  --  108*  --  109*  --  108*  --  106  --  108*  --  107   < > 104   < >  --    < >  --    CO2  --   --   --   --   --   --  20*  --  21*  --  19*  --  16*  --  22  --  20*  --  21*   < > 24   < >  --    < >  --    ANIONGAP  --   --   --   --   --   --  11  --  11  --  13  --  18*  --  10  --  12  --  9   < > 13   < >  --    < >  --    BUN  --   --   --   --   --   --  47.3*  --  46.5*  --  45.9*  --  46.3*  --  41.7*  --  39.0*  --  39.1*   < > 27.7*   < >  --    < >  --    CR  --   --   --   --   --   --  4.05*  --  3.93*  --  3.36*  --  3.30*  --  2.91*  --  2.51*  --  2.20*   < > 1.14   < >  --    < >  --    GFRESTIMATED  --   --   --   --   --   --  15*  --  16*  --  19*  --  19*  --  22*  --  27*  --  31*   < > 69   < >  --    < >  --    * 90 96 92 106* 89 105*   < > 108*   < > 90   < > 99   < > 108*   < > 120*   < > 128*   < > 171*   < >  --    < >  --    A1C  --   --   --   --   --   --   --   --   --   --   --   --   --   --   --   --   --   --   --   --  5.6  --   --    < >  --    MAIKOL  --   --   --   --   --   --  7.7*  --  7.8*  --  7.9*  --  7.7*  --  8.0*  --  7.9*  --  7.9*   < > 8.3*   < >  --    < >  --    PHOS  --   --   --   --   --   --  4.5  --  4.6*  --   --   --   --   --   --   --   --   --   --    < >  --   --   --   --   --    MAG  --    --   --   --   --   --   --   --   --   --   --   --  1.7  --   --   --   --   --  1.7   < > 1.7   < >  --   --   --    LACT  --   --   --   --   --   --   --   --   --   --   --   --   --   --   --   --   --   --   --   --   --   --  1.3  --  0.9    < > = values in this interval not displayed.     Hepatic Studies    Recent Labs   Lab Test 07/06/23  1708 07/06/23  0713 07/04/23  0627 06/26/23  0513 06/25/23  0615 06/24/23  0654 06/22/23  0711 06/20/23  0704 06/19/23  0915 06/16/23  0605 06/14/23  0651   BILITOTAL  --  <0.2 0.2  --   --   --   --  0.2 0.2 0.2 0.2   ALKPHOS  --  103 99  --   --   --   --  93 94 114 110   ALBUMIN 2.5* 2.4*  2.4* 2.6* 2.2* 2.3* 2.4*   < > 2.3* 2.6* 2.8* 2.8*   AST  --  9 11  --   --   --   --  14 19 16 16   ALT  --  12 14  --   --   --   --  14 12 10 8    < > = values in this interval not displayed.     Hematology Studies      Recent Labs   Lab Test 07/07/23  1251 07/06/23 0713 07/05/23  0934 07/04/23 0627 07/03/23  1138 07/01/23  1048   WBC 9.4 10.5 9.9 12.0* 11.6* 11.7*   HGB 7.7* 6.9* 7.2* 7.5* 7.5* 7.6*   HCT 24.6* 22.7* 23.6* 23.9* 24.3* 24.4*    307 318 353 403 362     Arterial Blood Gas Testing    Recent Labs   Lab Test 07/04/23  2017 07/03/23  1138 06/15/23  1325 11/26/22  0945   PH  --   --  7.39  --    PCO2  --   --  44  --    PO2  --   --  83  --    HCO3  --   --  26  --    O2PER 0 22 2 32          Latest Ref Rng & Units 7/7/2023    12:51 PM 7/6/2023     5:08 PM 7/6/2023     7:13 AM 7/5/2023     9:34 AM 7/5/2023     9:31 AM   Transplant Immunosuppression Labs   Creat 0.67 - 1.17 mg/dL  4.05  3.93   3.36    Urea Nitrogen 8.0 - 23.0 mg/dL  47.3  46.5   45.9    WBC 4.0 - 11.0 10e3/uL 9.4   10.5  9.9            Imaging:   XR Chest Port 1 View  Result Date: 6/14/2023  IMPRESSION: Negative portable view. If further detail is needed comment consider upright PA and lateral examination when clinical condition permits. TAE MORRIS MD   SYSTEM ID:  [id number]    XR  Abdomen 1 View  Result Date: 6/10/2023  IMPRESSION: Nonobstructive bowel gas pattern. Large stool burden. I have personally reviewed the examination and initial interpretation and I agree with the findings. JONNATHAN COLEMAN MD   SYSTEM ID:  [id number]

## 2023-07-08 NOTE — PROGRESS NOTES
Nephrology Progress Note  07/08/2023         Assessment & Recommendations:   A/Rec: 73yo M with persistent prosthetic joint infection of L hip admitted for MRSA bacteremia with recurrent HALEY.     #HALEY - Cr improved to 1.4 on 6/26 but has since risen to 4.05 yesterday, no labs today, likely secondary to hemodynamic instability and overdiuresis. Continued exposure to vanc could be contributing.   -holding diuretic  -No indications for RRT yet, will continue to monitor closely    Cr is stable over last 2-3 days  - recommend daily vanc levels       #Electrolytes no indication for dialysis.    Hgb stable.     Recommendations were communicated to primary team via this note and verbally    Kristopher Rodriguez MD  Division of Nephrology and Hypertension  Pager: [phone]  July 8, 2023  2:48 PM    Interval History :   Reviewed provider and nursing notes for past 24 hours.   Patient alert  C/o L ankle/heel pain.  Denies CP, SOB      Physical Exam:   I/O last 3 completed shifts:  In: 370 [P.O.:340; I.V.:30]  Out: 50 [Urine:50]  Vitals: /43  Pulse 67  Resp 16  SpO2 99%  Temp 97.7  GENERAL APPEARANCE: awake, no distress  EYES:  no scleral icterus  PULM: no respiratory distress .   Lungs clear to lat exam  CV: regular rhythm, normal rate.  No rub/gallop heard  Abd obese, soft, non tender  Extremities: 2+ LE edema  NEURO: no myoclonus observed today.   Responses are slow, but appropriate.       Labs:   All labs reviewed by me  Electrolytes/Renal -   Recent Labs   Lab Test 07/08/23  0851 07/08/23  0828 07/08/23  0225 07/07/23  1256 07/07/23  1251 07/06/23  1737 07/06/23  1708 07/05/23  0823 07/05/23  0700 07/02/23  1754 07/02/23  1149 07/01/23  2208 07/01/23 2007     --   --   --  141  --  137   < > 142   < > 137  --   --    POTASSIUM 3.6  --   --   --  3.7  --  4.0   < > 4.5   < > 4.3  --   --    CHLORIDE 110*  --   --   --  110*  --  106   < > 108*   < > 107  --   --    CO2 20*  --   --   --  20*  --  20*   < > 16*   <  > 21*  --   --    BUN 45.2*  --   --   --  46.8*  --  47.3*   < > 46.3*   < > 39.1*  --   --    CR 4.02*  --   --   --  3.99*  --  4.05*   < > 3.30*   < > 2.20*  --   --    GLC 95 91 84   < > 91   < > 105*   < > 99   < > 128*   < >  --    MAIKOL 7.3*  --   --   --  7.6*  --  7.7*   < > 7.7*   < > 7.9*  --   --    MAG  --   --   --   --   --   --   --   --  1.7  --  1.7  --  1.5*   PHOS 4.3  --   --   --  4.5  --  4.5   < >  --   --   --   --   --     < > = values in this interval not displayed.       CBC -   Recent Labs   Lab Test 07/08/23  0851 07/07/23  1251 07/06/23  0713   WBC 9.0 9.4 10.5   HGB 7.5* 7.7* 6.9*    310 307       LFTs -   Recent Labs   Lab Test 07/08/23  0851 07/07/23  1251 07/06/23  1708 07/06/23  0713 07/04/23  0627 06/22/23  0711 06/20/23  0704   ALKPHOS  --   --   --  103 99  --  93   BILITOTAL  --   --   --  <0.2 0.2  --  0.2   ALT  --   --   --  12 14  --  14   AST  --   --   --  9 11  --  14   PROTTOTAL  --   --   --  5.3* 5.9*  --  5.9*   ALBUMIN 2.2* 2.4* 2.5* 2.4*  2.4* 2.6*   < > 2.3*    < > = values in this interval not displayed.       Iron Panel -   Recent Labs   Lab Test 06/26/23  0513   IRON 30*   IRONSAT 21   FADI 2,616*         Imaging:  Reviewed     Current Medications:    apixaban ANTICOAGULANT  5 mg Oral BID     atorvastatin  40 mg Oral Daily     cefTAZidime  2 g Intravenous Q24H     ferrous sulfate  325 mg Oral Every Other Day     folic acid  1 mg Oral Daily     [Held by provider] furosemide  60 mg Oral Daily     heparin lock flush  5-20 mL Intracatheter Q24H     insulin aspart  1-7 Units Subcutaneous TID AC     insulin aspart  1-5 Units Subcutaneous At Bedtime     lactobacillus rhamnosus (GG)  1 capsule Oral BID     [Held by provider] methocarbamol  500 mg Oral 4x Daily     miconazole   Topical BID     multivitamin w/minerals  1 tablet Oral Daily     polyethylene glycol  17 g Oral BID     senna-docusate  1 tablet Oral BID     sodium chloride (PF)  10 mL Intracatheter Q8H      sodium chloride (PF)  10-40 mL Intracatheter Q8H     thiamine  500 mg Intravenous Daily     vancomycin place montilla - receiving intermittent dosing  1 each Intravenous See Admin Instructions       sodium chloride 75 mL/hr at 07/08/23 0903     Kristopher Rodriguez MD

## 2023-07-08 NOTE — PLAN OF CARE
"  VS: BP (!) 150/68 (BP Location: Left arm)   Pulse 73   Temp 98  F (36.7  C) (Oral)   Resp 16   Ht 1.778 m (5' 10\")   Wt 126 kg (277 lb 12.5 oz)   SpO2 98%   BMI 39.86 kg/m      O2: O2> 95% ORA, denies feeling SOB, lightheadesness  Lungs clear, equal bilateral    Output: Incontinent, attempted to void using bedside urinal - strains to void, frequently checks    Last BM: 7/7 - reported loose stools,    Activity: Ax2-3 with lift device for transfers and Ax2 for bed cares  Tremors present    Up for meals? Sits up for meals   Skin: L hip incision. Scattered bruising to BUE, swelling to BLE - refuses for anyone to touch L foot  Redness to outer groin areas - barrier cream applied  R hip redness - soap and water, dried, barrier cream applied   Pain: Managed with PRN oxy and tramadol  Using oxy to re-medicate before turns    CMS: LLE numbness due to foot drop per pt report. A&O x3-4, disoriented to time, at times   Reports feeling \"fuzzy headed\" has a hard time finding words, slow clear, speech.  Oriented to self, situation, place, flat affect.    Dressing: LLE dressing CDI  Mepilex on coccyx CDI   Diet: Reg, denies nausea/vomiting   On glucose checks before meals   Needs assistance w/ ordering meals and set up and encouragement to eat     LDA: R CVC heparin locked per pt request at 1630   Plan: Continue POC for pain management and intermittent antibiotics - continue frequent checks for incontinence    Additional Info: On RN managed potassium - did not have to be replaced today   \recheck ordered for 7/9 AM       "

## 2023-07-08 NOTE — PROGRESS NOTES
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  Postop course complicated by profound deconditioning.  He was admitted to TCU initially on 12/23/2022 for physical rehabilitation, however therapies no longer worked with patient after several months of an in bed therapy and his refusals to mobilize.  Underwent biopsy on 4/7/2023 without evidence of infection.  He was transferred back to [facility] for left total hip arthroplasty and reimplantation on 5/26/23 with Dr. Meyers.  Presented again to [place] for persistent MRSA bacteremia on 6/17.     Brief Hospital Summary:    Pt was admitted to continue infectious workup with MRSA bacteremia.  Admission blood cultures were positive, ID and Ortho were consulted. KEILA was obtained on 6/19, which was negative for endocarditis, ortho took for washout/debridement of hip on 6/21. ID recommending 6 weeks abx (6/21-8/1), patient recovering from HALEY, but given history of HALEY's and need for long term vanc plus comorbids making declining kidney function in the future probable nephrology doesn't feel comfortable with patient getting a PICC -- Tunneled internal jugular line placed. Transitioned cefepime to cipro back to cefepime given HALEY and prolonged qtc, continue vancomycin.     Plan for today:  - continue MIVF with limited PO intake,  - now that MS is improving, discussed with Rahat nutrition, rehab, medical condition. He is considering his options and processing his disease course  - Ceftazidime and vanc to continue.    - low dose (2.5 mg) oxy with position changes/therapy  - palliative consult for GOC    AMS due to Delirium from illness/hospitalization and possible toxic metabolic encephalopathy, likely 2/2 cefepime neurotoxicity  - off cefepime since 7/4  - Nephrology following  - CT head, ammonia, vbg reassuring.    - neurology consulted 7/6  - thiamine per neuro    MRSA bacteremia due to possible left hip septic arthritis vs other source Blood cultures: 6/14, 6/15 + for MRSA (4/4), and 6/16 (1/2 GPCs) and 6/17 (2/2) GPCs. - started vancomycin 6/15.   6/17 sinovial fluid cultures with staph and klebsiella. Purulent drainage noted at incision site at TCU, CT left hip concerning for infection (no mention of joint, mostly anterior in soft tissue) and couldn't exclude abscess/inf  - Continue vancomycin, added rocephin 6/21 and changed to cefepime 6/23, ceftazidime 7/4; ID recommends 6 weeks IV vanc (last day 8/1/23) and can transition to oral cipro 750 mg BID at discharge, needs weekly vanc level, cbcd, cmp, crp while on IV abx, regular ECG monitoring of QTc on high dose cipro --> follow up outpatient with ID prior to discontinuing antibiotics (needs appt end of July)  - blood cultures negative 6/19 and 6/20 (last + was 6/17)    Acute Kidney Injury - multifactorial, see below   Nephrology consulted, appreciate recs.   - baseline creatinine 1.0 or 1.1  - etiology: suspect combo of sepsis/inflammation, hypovolemia, blood loss anemia following surgery, vancomycin  - volume (hypovolemia/congestion): euvolemic but tough to assess based on habitus  - nephrotoxins: vancomycin  - diabetes: yes; last A1C < 6, though  - sepsis: resolved now  - anemia: yes, ongoing  - cardiac performance: normal based on KEILA from 6/19  - inflammation: from surgery and infection  - creatinine downtrending; neph ordered C3 and 4, normal    Bilateral LE edema due to volume overload from HALEY and iatrogenic from blood transfusions  - HOLDING diuretics as  above    Hypoxemic Respiratory Failure due to immobility, surgery - resolved (sena)  - patient with fluctuating oxygen requirement but off O2 since 6/23  - continue to follow; encourage IS    Acute blood loss anemia following surgery, complicated by use of blood thinner  - 3 units day after surgery did joint washout (6/22) for acute blood loss anemia, 1 unit 6/25  - watch for bleeding; hold apixaban if need be - had unprovoked dvt in 2018 and would be on eliquis for life, high risk of recurrent DVT given recent procedures and illness/immobility  - iron and folic acid daily    S/p left hip explantation of left hip antibiotic spacer and revision of left total hip arthroplasty (5/26/2023)  - ortho performed washout 6/21; drain to come out in 2 weeks  -Activity: 50% PWB LLE with posterior hip precautions [id number]  -AFO ordered for foot drop but patient refusing  -PT/OT  -Knee to be kept even in a foam leg elevator to relax sciatic nerve  -Pain: holding robaxin 4 times a day with MS changes, APAP PRN                 -oxycodone  to 2.5 mg                  -tramadol 25-50 mg every 6 hours PRN    Hx of unprovoked DVT in 2018   - on eliquis 5 mg bid     Type 2 diabetes mellitus  - Last hemoglobin A1c 5.6% on 6/16/2023.  On glipizide, metformin, Actos, and Victoza prior to hospitalizations and surgery.   - continue ISS    Constipation  -Continue Colace 100 mg twice daily  -Continue senna 1 tablet twice daily  -Continue MiraLAX daily     Hypomagnesemia  -monitor and replete      Severe malnutrition in the context of acute on chronic illness  Goal for patient is to consume % of meals TID. Goal is 8461-3125 kcals/daily. Protein needs- 100-126 grams/daily.   -RD consult and appreciate recs  -regular diet  -Weekly weights, daily I/O     Adjustment disorder with depressed mood  Prolonged grief disorder  Patient has been with depressed mood in setting of ongoing health issues, as evidenced by lack of motivation to work with  "therapies during both TCU admissions.  Past history of passive suicidal statements. Psychiatry saw patient during hospital admission on 5/31 due to lack of motivation with therapies. Patient declined interest in starting any psychiatric medications. Was willing to engage with health psychology.   -Outpatient health psychology consult  -Continue to monitor for willingness to start antidepressant therapy     Stable and Resolved Issues:  Hyperlipidemia-continue PTA Lipitor 40 mg daily  JAIR-CPAP when sleeping     Diet: Regular Diet Adult  Room Service    DVT Prophylaxis: DOAC  Tran Catheter: Not present  Lines: PRESENT      CVC Single Lumen Right Internal jugular Non - valved (open ended);Tunneled;Power injectable-Site Assessment: WDL      Cardiac Monitoring: None  Code Status: Full Code      Clinically Significant Risk Factors              # Hypoalbuminemia: Lowest albumin = 2.2 g/dL at 7/8/2023  8:51 AM, will monitor as appropriate    # Acute Kidney Injury, unspecified: based on a >150% or 0.3 mg/dL increase in last creatinine compared to past 90 day average, will monitor renal function         # Obesity: Estimated body mass index is 39.86 kg/m  as calculated from the following:    Height as of this encounter: 1.778 m (5' 10\").    Weight as of this encounter: 126 kg (277 lb 12.5 oz).   # Severe Malnutrition: based on nutrition assessment         Disposition Plan           Hector Burnham MD  Hospitalist Service, GOLD TEAM 21  [facility]  Securely message with Isabella Oliver (more info)  Text page via [facility] Paging/Directory   See signed in provider for up to date coverage information  ______________________________________________________________________    Interval History   Much improved alertness, able to track conversation and ask some questions. Seems to understand disease process and severity of illness.    Physical Exam   Vital Signs: Temp: 98.2  F (36.8  C) Temp " src: Oral BP: 139/53 Pulse: 72   Resp: 16 SpO2: 98 % O2 Device: None (Room air)    Weight: 277 lbs 12.47 oz    Constitutional: lying in bed  CV: regular rate and rhythm, no murmurs rubs or gallops  Resp: crackles to auscultation bilaterally  Abd: soft, non-tender, non-distended, no hepatosplenomegaly or masses palpated, hernia reducible   Neuro: alert, oriented x2-3. CN grossly intact, WEEKS.     Medical Decision Making       50 MINUTES SPENT BY ME on the date of service doing chart review, history, exam, documentation & further activities per the note.      Data     I have personally reviewed the following data over the past 24 hrs:    9.0  \   7.5 (L)   / 286     140 110 (H) 45.2 (H) /  95   3.6 20 (L) 4.02 (H) \       ALT: N/A AST: N/A AP: N/A TBILI: N/A   ALB: 2.2 (L) TOT PROTEIN: N/A LIPASE: N/A       Imaging results reviewed over the past 24 hrs:   No results found for this or any previous visit (from the past 24 hour(s)).

## 2023-07-08 NOTE — PLAN OF CARE
Goal Outcome Evaluation:  VS: Temp: 98.2  F (36.8  C) Temp src: Oral BP: (!) 148/61 Pulse: 72   Resp: 12 SpO2: 97 % O2 Device: None (Room air)      O2: stable on RA. LS clear and equal bilaterally. Denies chest pain and SOB.    Output: Voids spontaneously without difficulty  / using beside urinal /  or incontinent   Last BM: Bm 7/7/3 denies abdominal discomfort. BS active / passing flatus.    Activity: Bedrest or ceiling lift   Skin: WDL except, left hip incision and edema both legs and right hand warm heat applied and came down bruised, upper arm slight swollen   Pain: Pain managed with Oxy for turning was given this morning and ATRUM   CMS: Intact, AOx4. Denies numbness and tingling.   Dressing: LEFT HIP DRESSING C/D/I   Diet: Regular diet. Denies nausea/vomiting.   BG checks before meals and at bedtime 98 pt refused dinner even juices. BG check at bedtime BG overnight 89, 102 after eating potato chips few of them   LDA: CVC TKO running   Equipment: IV pole, personal belongings,    Plan: FALL AND CONFUSION, BED ALARM ON precautions maintained / Continue with plan of care. Call light within reach, pt able to make needs known.    Additional Info:

## 2023-07-09 NOTE — PROGRESS NOTES
[place] [facility] [place]      Patient:  Waldo Bustos, Date of birth 1950, Medical record number [id number]  Date of Visit:  July 7 2023         Assessment and Recommendations:   Assessment:  1. MRSA bacteremia, suspected secondary to left hip PJI and associated soft tissue infection; KEILA negative for IE   -6/14 blood cultures: 4 of 4 bottles positive  -6/15 blood cultures: 4 of 4 bottles positive  -6/17 blood cultures: 2 of 4 bottles positive  -6/19 NGTD  -6/20 NGTD  2. Left hip PJI, prior episodes dating to 2018 with C acnes, most recent  dx in 11/2022 with cultures showing Proteus, Porphyromonous, and Finegoldia, s/p abx and interval synovial sampling on 4/7/23 with 13K nucleated cells (65%N) and no culture growth, ultimately s/p 2 stage revision with re-implantation on 5/26/23 with negative cultures  L hip was re-sampled on 6/17 (from pre-existing drain) in the setting of MRSA bacteremia, cx with MRSA, Corynebacterium, K pneumoniae; Ultimately I&D on 6/21/23 without liner exchange, operative appearance was somewhat surprisingly reassuring with no purulence, intraop cultures: MRSA, C.acnes, and Pseudomonas  3. DM2  4. RA  5. Venous insufficiency  6. QTC = 421 -> 490 - > 492_> 502  7. HALEY, onset around 6/21, attributed to elevated vanco trough, hemodynamics, sepsis due to MRSA bacteremia  8. Delirium, likely cefepime neurotoxicity in the setting of HALEY  9. Lytic lesion in the medial L ilium, measuring 3.6x5.6 cm, present since 2018    Recommendations:    --Stop Cefepime  --Continue Ceftazidime 2 gram Q8h for 6 week IV course  -- Continue IV vancomycin (pharmacy to dose)    - Anticipate an initial 6 weeks of IV antibiotic therapy, followed by likely transition to oral antibiotics   - Given polymicrobial infection and multiple potential drug-drug interactions, will treat without adjunctive rifampin at this time   - Long-term antibiotic plan to be determined in outpatient ID  follow up   - Weekly CBC w/diff, CMP, CRP, and vancomycin level while in IV therapy        Discussion:  Pt who was followed by ID team at  ([place] [facility] and [place] [facility] [place] [facility], [place] [facility] [place].      73yo M history of chronic left hip PJI, most recently s/p revision left BELLA on 5/26, who had been in TCU since discharge on 6/2, but then developed MRSA bacteremia with confusion and somnolence. Presumed hip infection and they obtained cultures from drain on 6/17/2023 - showing MRSA, Klebsiella, and Corynebacterium. Patient taken to OR for DAIR with Dr. Meyers on 6/21 - operative relatively reassuring, but entire picture certainly suspicious for infection.    What is unusual here is the number of cultured species. This could be related to the chronicity of his PJI, though he underwent definitive management with 2-stage revision with reimplantation cultures negative. Also could be related to prolonged presence of drain presumably from 5/26 until at least 6/17 when it was cultured -  this seems longer than tyipcal and makes me wonder what process resulted in persistent fluid production necessitating a prolonged surgical drain. He has a stable lytic lesion in his L ilium - could this be at all an anatomic predisposition (eg, sequestrum) or representative of a systemic process (eg myeloma)?    Given confusion on 7/4 and rising Cr cefepime was held due to concern for cefepime neurotoxicity, which this picture is consistent with. Carbepenems are more likely to have confusion. Fluroquinolones are also possible if QTc is wnl but they also have some concern for confusion. I will continue ceftazidime today and get an EKG. If the QTc is improved we can consider using a flouroquinolone.     7/9 The patient's mental status is significantly improved. We will stop cefepime and complete course with ceftazidime.     I will sign off but please call with questions.     [place]  "Nafisa Moon MD  [phone]    Outpatient Parenteral Antimicrobial Therapy/ID Follow up    Infectious Diseases Indication: L Hip PJI    Antibiotic Information  Name of Antibiotic Dose of Antibiotic1 Anticipated duration   Vancomycin  IAUC goal 400-600 6-8 weeks, potential end date 8/1/23   Ceftazidime 2 gram q24, adj with renal function 6-8 weeks, potential end date 8/1/23        1.Dose of antibiotic will need to be renally adjusted if creatinine clearance changes    Method of antibiotic delivery:PICC line.Will the line be used for another indication besides antimicrobials? Yes At the end of therapy should the line used for antimicrobials be removed or de-accessed? No. Selecting \"yes\" will function as written order to remove PICC line or de-access the indwelling line at the end of therapy.    Weekly labs required: CBC with diff, CMP, CRP, ESR and Vancomycin level    Imaging for ID follow up: ID Imaging: No.     All outpatient OPAT starts will be scheduled with Elysia Corbin NP within 2 weeks of initiation unless otherwise specified. Type of clinic appointment Okay for in person or a virtual visit.  If additional appointments are needed later in the antibiotic course specify the provider Sarai, timing of visit 8/1/23, and the type of visit In-Person visit.     ID provider route note: OPAT RN Care Coordinator [facility] ID Clinic Information:  Phone: [phone]  Fax: [phone] (Attention ID Clinic Nurses)        Interval History:   Afebrile. Per other providers, significant improvement in mental status.          Physical Exam:   Ranges for vital signs:  Temp:  [98  F (36.7  C)-98.1  F (36.7  C)] 98.1  F (36.7  C)  Pulse:  [73-77] 73  Resp:  [16] 16  BP: (150-172)/(68-77) 150/74  SpO2:  [94 %-98 %] 95 %    Intake/Output Summary (Last 24 hours) at 6/16/2023 1115  Last data filed at 6/15/2023 2239  Gross per 24 hour   Intake 50 ml   Output --   Net 50 ml "     Exam:  GENERAL: Well-developed, well-nourished. Awake and alert.   ENT:  Head is normocephalic, atraumatic.   EYES:  Eyes have anicteric sclerae.    LUNGS:  Breathing comfortably  EXT: Extremities warm and without edema. Left hip with GUERRERO drain in place, serosanguinous fluid in line and collection vessel.  SKIN:  No acute rashes. Surgical site with no obvious erythema or purulence.  NEUROLOGIC:  Grossly nonfocal, though confused and somnolent         Laboratory Data:   Reviewed.  Pertinent for:    Microbiology:  Culture   Date Value Ref Range Status   06/21/2023 1+ Cutibacterium (Propionibacterium) acnes (A)  Final     Comment:     Susceptibility testing of Cutibacterium (Propionibacterium) species is not done from this source. This organism is susceptible to penicillin and cefotaxime and most are susceptible to clindamycin.   06/21/2023 No anaerobic organisms isolated  Final   06/21/2023 No anaerobic organisms isolated  Final   06/21/2023 No anaerobic organisms isolated  Final   06/21/2023 1+ Staphylococcus aureus (A)  Final     Comment:     Susceptibilities done on previous cultures   06/21/2023 1+ Pseudomonas aeruginosa (A)  Final   06/21/2023 1+ Staphylococcus aureus MRSA (A)  Final   06/21/2023 2+ Staphylococcus aureus (A)  Final     Comment:     Susceptibilities done on previous cultures   06/21/2023 1+ Pseudomonas aeruginosa (A)  Final     Comment:     Susceptibilities done on previous cultures   06/21/2023 1+ Staphylococcus aureus (A)  Final     Comment:     Susceptibilities done on previous cultures   06/21/2023 2+ Staphylococcus aureus (A)  Final     Comment:     Susceptibilities done on previous cultures   06/21/2023 No anaerobic organisms isolated  Final   06/21/2023 2+ Staphylococcus aureus MRSA (A)  Final   06/20/2023 No Growth  Final   06/20/2023 No Growth  Final   06/19/2023 No Growth  Final   06/19/2023 No Growth  Final   06/17/2023 4+ Staphylococcus aureus MRSA (A)  Final   06/17/2023 2+  Corynebacterium striatum (A)  Final     Comment:     Identification obtained by MALDI-TOF mass spectrometry research use only database. Test characteristics determined and verified by the Infectious Diseases Diagnostic Laboratory.   06/17/2023 1+ Klebsiella pneumoniae (A)  Final   06/17/2023 No anaerobic organisms isolated  Final   06/17/2023 Positive on the 1st day of incubation (A)  Final   06/17/2023 Staphylococcus aureus MRSA (AA)  Final     Comment:     1 of 2 bottlesSusceptibilities done on previous cultures   06/17/2023 Positive on the 1st day of incubation (A)  Final   06/17/2023 Staphylococcus aureus MRSA (AA)  Final     Comment:     1 of 2 bottlesSusceptibilities done on previous cultures   06/17/2023 <10,000 CFU/mL Urogenital marimar  Final   06/16/2023 Positive on the 1st day of incubation (A)  Final   06/16/2023 Staphylococcus aureus MRSA (AA)  Final     Comment:     1 of 2 bottlesSusceptibilities done on previous cultures   06/16/2023 Positive on the 1st day of incubation (A)  Final   06/16/2023 Staphylococcus aureus MRSA (AA)  Final     Comment:     1 of 2 bottlesSusceptibilities done on previous cultures   06/15/2023 Positive on the 1st day of incubation (A)  Final   06/15/2023 Staphylococcus aureus MRSA (AA)  Final     Comment:     2 of 2 bottlesSusceptibilities done on previous cultures   06/15/2023 Positive on the 1st day of incubation (A)  Final   06/15/2023 Staphylococcus aureus MRSA (AA)  Final     Comment:     2 of 2 bottlesSusceptibilities done on previous cultures   06/14/2023 Positive on the 1st day of incubation (A)  Final   06/14/2023 Staphylococcus aureus MRSA (AA)  Final     Comment:     2 of 2 bottlesSusceptibilities done on previous cultures   06/14/2023 Positive on the 1st day of incubation (A)  Final   06/14/2023 Staphylococcus aureus MRSA (AA)  Final     Comment:     2 of 2 bottles     05/26/2023 No anaerobic organisms isolated  Final   05/26/2023 No anaerobic organisms isolated   Final   05/26/2023 No anaerobic organisms isolated  Final   05/26/2023 No anaerobic organisms isolated  Final   05/26/2023 No Growth  Final   05/26/2023 No Growth  Final   05/26/2023 No Growth  Final   05/26/2023 No Growth  Final   05/26/2023 No anaerobic organisms isolated  Final   05/26/2023 No Growth  Final   04/07/2023 No anaerobic organisms isolated  Final   04/07/2023 No Growth  Final   04/07/2023 No anaerobic organisms isolated  Final   04/07/2023 No Growth  Final   04/07/2023 No anaerobic organisms isolated  Final   04/07/2023 No Growth  Final   04/07/2023 No anaerobic organisms isolated  Final   04/07/2023 No Growth  Final   04/07/2023 No anaerobic organisms isolated  Final   04/07/2023 No Growth  Final   04/07/2023 No anaerobic organisms isolated  Final   04/07/2023 No Growth  Final   11/22/2022 4+ Finegoldia magna (A)  Final     Comment:     Susceptibilities done on previous cultures   11/22/2022 No Growth  Final   11/22/2022 1+ Proteus mirabilis (A)  Final     Comment:     Susceptibilities done on previous cultures   11/22/2022 1+ Finegoldia magna (A)  Final     Comment:     Susceptibilities done on previous cultures   11/22/2022 No Growth  Final   11/22/2022 1+ Proteus mirabilis (A)  Final   11/22/2022 No anaerobic organisms isolated  Final   11/22/2022 No anaerobic organisms isolated  Final   11/22/2022 No anaerobic organisms isolated  Final   11/22/2022 No Growth  Final   11/22/2022 No Growth  Final   11/22/2022 No Growth  Final   11/22/2022 Isolated in broth only Proteus mirabilis (A)  Final     Comment:     On day 1 of incubationSusceptibilities done on previous cultures   11/22/2022 Isolated in broth only Proteus mirabilis (A)  Final     Comment:     On day 1 of incubationSusceptibilities done on previous cultures   11/22/2022 1+ Proteus mirabilis (A)  Final   11/22/2022 No anaerobic organisms isolated  Final   11/22/2022 No Growth  Final   11/22/2022 1+ Proteus mirabilis (A)  Final     Comment:      Susceptibilities done on previous cultures   11/14/2022 4+ Porphyromonas species (A)  Final     Comment:     Beta Lactamase Positive  Identification obtained by MALDI-TOF mass spectrometry research use only database. Test characteristics determined and verified by the Infectious Diseases Diagnostic Laboratory.   11/14/2022 1+ Finegoldia magna (A)  Corrected   11/14/2022 3+ Proteus mirabilis (A)  Final   11/14/2022 No Growth  Final     Metabolic Studies       Recent Labs   Lab Test 07/09/23  1135 07/09/23  0856 07/09/23  0133 07/08/23  2251 07/08/23  1647 07/08/23  0851 07/07/23  1256 07/07/23  1251 07/06/23  1737 07/06/23  1708 07/06/23  0750 07/06/23  0713 07/05/23  1728 07/05/23  0931 07/05/23  0823 07/05/23  0700 07/02/23  1754 07/02/23  1149 06/16/23  0820 06/16/23  0605 03/21/23  0810 03/20/23  1919 01/10/23  1710 01/10/23  1105   NA  --   --   --   --   --  140  --  141  --  137  --  140  --  141  --  142   < > 137   < > 141   < >  --    < >  --    POTASSIUM  --   --   --   --   --  3.6  --  3.7  --  4.0  --  3.9  --  4.3  --  4.5   < > 4.3   < > 4.2   < >  --    < >  --    CHLORIDE  --   --   --   --   --  110*  --  110*  --  106  --  108*  --  109*  --  108*   < > 107   < > 104   < >  --    < >  --    CO2  --   --   --   --   --  20*  --  20*  --  20*  --  21*  --  19*  --  16*   < > 21*   < > 24   < >  --    < >  --    ANIONGAP  --   --   --   --   --  10  --  11  --  11  --  11  --  13  --  18*   < > 9   < > 13   < >  --    < >  --    BUN  --   --   --   --   --  45.2*  --  46.8*  --  47.3*  --  46.5*  --  45.9*  --  46.3*   < > 39.1*   < > 27.7*   < >  --    < >  --    CR  --   --   --   --   --  4.02*  --  3.99*  --  4.05*  --  3.93*  --  3.36*  --  3.30*   < > 2.20*   < > 1.14   < >  --    < >  --    GFRESTIMATED  --   --   --   --   --  15*  --  15*  --  15*  --  16*  --  19*  --  19*   < > 31*   < > 69   < >  --    < >  --    * 86 90 92 94 95   < > 91   < > 105*   < > 108*   < > 90   < > 99    < > 128*   < > 171*   < >  --    < >  --    A1C  --   --   --   --   --   --   --   --   --   --   --   --   --   --   --   --   --   --   --  5.6  --   --    < >  --    MAIKOL  --   --   --   --   --  7.3*  --  7.6*  --  7.7*  --  7.8*  --  7.9*  --  7.7*   < > 7.9*   < > 8.3*   < >  --    < >  --    PHOS  --   --   --   --   --  4.3  --  4.5  --  4.5  --  4.6*  --   --   --   --   --   --    < >  --   --   --   --   --    MAG  --   --   --   --   --   --   --   --   --   --   --   --   --   --   --  1.7  --  1.7   < > 1.7   < >  --   --   --    LACT  --   --   --   --   --   --   --   --   --   --   --   --   --   --   --   --   --   --   --   --   --  1.3  --  0.9    < > = values in this interval not displayed.     Hepatic Studies    Recent Labs   Lab Test 07/08/23  0851 07/07/23  1251 07/06/23  1708 07/06/23  0713 07/04/23  0627 06/26/23  0513 06/22/23  0711 06/20/23  0704 06/19/23  0915 06/16/23  0605 06/14/23  0651   BILITOTAL  --   --   --  <0.2 0.2  --   --  0.2 0.2 0.2 0.2   ALKPHOS  --   --   --  103 99  --   --  93 94 114 110   ALBUMIN 2.2* 2.4* 2.5* 2.4*  2.4* 2.6* 2.2*   < > 2.3* 2.6* 2.8* 2.8*   AST  --   --   --  9 11  --   --  14 19 16 16   ALT  --   --   --  12 14  --   --  14 12 10 8    < > = values in this interval not displayed.     Hematology Studies      Recent Labs   Lab Test 07/09/23  1145 07/08/23  0851 07/07/23  1251 07/06/23  0713 07/05/23  0934 07/04/23  0627   WBC 11.8* 9.0 9.4 10.5 9.9 12.0*   HGB 8.2* 7.5* 7.7* 6.9* 7.2* 7.5*   HCT 27.1* 23.5* 24.6* 22.7* 23.6* 23.9*    286 310 307 318 353     Arterial Blood Gas Testing    Recent Labs   Lab Test 07/04/23 2017 07/03/23  1138 06/15/23  1325 11/26/22  0945   PH  --   --  7.39  --    PCO2  --   --  44  --    PO2  --   --  83  --    HCO3  --   --  26  --    O2PER 0 22 2 32          Latest Ref Rng & Units 7/9/2023    11:45 AM 7/8/2023     8:51 AM 7/7/2023    12:51 PM 7/6/2023     5:08 PM 7/6/2023     7:13 AM   Transplant  Immunosuppression Labs   Creat 0.67 - 1.17 mg/dL  4.02  3.99  4.05  3.93    Urea Nitrogen 8.0 - 23.0 mg/dL  45.2  46.8  47.3  46.5    WBC 4.0 - 11.0 10e3/uL 11.8  9.0  9.4   10.5           Imaging:   XR Chest Port 1 View  Result Date: 6/14/2023  IMPRESSION: Negative portable view. If further detail is needed comment consider upright PA and lateral examination when clinical condition permits. TAE MORRIS MD   SYSTEM ID:  [id number]    XR Abdomen 1 View  Result Date: 6/10/2023  IMPRESSION: Nonobstructive bowel gas pattern. Large stool burden. I have personally reviewed the examination and initial interpretation and I agree with the findings. JONNATHAN COLEMAN MD   SYSTEM ID:  [id number]

## 2023-07-09 NOTE — PROGRESS NOTES
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  Postop course complicated by profound deconditioning.  He was admitted to TCU initially on 12/23/2022 for physical rehabilitation, however therapies no longer worked with patient after several months of an in bed therapy and his refusals to mobilize.  Underwent biopsy on 4/7/2023 without evidence of infection.  He was transferred back to [facility] for left total hip arthroplasty and reimplantation on 5/26/23 with Dr. Meyers.  Presented again to [place] for persistent MRSA bacteremia on 6/17.     Brief Hospital Summary:    Pt was admitted to continue infectious workup with MRSA bacteremia.  Admission blood cultures were positive, ID and Ortho were consulted. KEILA was obtained on 6/19, which was negative for endocarditis, ortho took for washout/debridement of hip on 6/21. ID recommending 6 weeks abx (6/21-8/1), patient recovering from HALEY, but given history of HALEY's and need for long term vanc plus comorbids making declining kidney function in the future probable nephrology doesn't feel comfortable with patient getting a PICC -- Tunneled internal jugular line placed.     Plan for today:  - continue MIVF with limited PO intake, assess daily  - Ongoing discussions about goals of care and participation in nutrition/therapy  - Ceftazidime and vanc to continue.   - follow up labs from this morning  - palliative consult for GOC, further discussions tomorrow  - discuss resumption of diuretics with nephrology  -  condom cath  - pulsate mattress ordered to reduce pressure injury risk    AMS due to Delirium from illness/hospitalization and possible toxic metabolic encephalopathy, likely 2/2 cefepime neurotoxicity, improved  - off cefepime since 7/4  - Nephrology following  - CT head, ammonia, vbg reassuring.    - neurology consulted 7/6  - thiamine per neuro x5 days    MRSA bacteremia due to possible left hip septic arthritis vs other source Blood cultures: 6/14, 6/15 + for MRSA (4/4), and 6/16 (1/2 GPCs) and 6/17 (2/2) GPCs. - started vancomycin 6/15.   6/17 sinovial fluid cultures with staph and klebsiella. Purulent drainage noted at incision site at TCU, CT left hip concerning for infection (no mention of joint, mostly anterior in soft tissue) and couldn't exclude abscess/inf  - Continue vancomycin, added rocephin 6/21 and changed to cefepime 6/23, ceftazidime 7/4; ID recommends 6 weeks IV vanc (last day 8/1/23) and can transition to oral cipro 750 mg BID at discharge, needs weekly vanc level, cbcd, cmp, crp while on IV abx, regular ECG monitoring of QTc on high dose cipro --> follow up outpatient with ID prior to discontinuing antibiotics (needs appt end of July)  - blood cultures negative 6/19 and 6/20 (last + was 6/17)    Acute Kidney Injury - multifactorial, see below   Nephrology consulted, appreciate recs.   - baseline creatinine 1.0 or 1.1  - etiology: suspect combo of sepsis/inflammation, hypovolemia, blood loss anemia following surgery, vancomycin  - volume (hypovolemia/congestion): euvolemic but tough to assess based on habitus  - nephrotoxins: vancomycin  - diabetes: yes; last A1C < 6, though  - sepsis: resolved now  - anemia: yes, ongoing  - cardiac performance: normal based on KEILA from 6/19  - inflammation: from surgery and infection  - creatinine downtrending; neph ordered C3 and 4, normal    Bilateral LE edema due to volume overload from HALEY and iatrogenic from blood transfusions  - HOLDING diuretics as  above    Hypoxemic Respiratory Failure due to immobility, surgery - resolved  - continue to follow; encourage IS    Acute blood loss anemia following surgery, complicated by use of blood thinner  - 3 units day after surgery did joint washout (6/22) for acute blood loss anemia, 1 unit 6/25  - watch for bleeding; hold apixaban if need be - had unprovoked dvt in 2018 and would be on eliquis for life, high risk of recurrent DVT given recent procedures and illness/immobility  - iron and folic acid daily    S/p left hip explantation of left hip antibiotic spacer and revision of left total hip arthroplasty (5/26/2023)  - ortho performed washout 6/21; drain to come out in 2 weeks  -Activity: 50% PWB LLE with posterior hip precautions [id number]  -AFO ordered for foot drop but patient refusing  -PT/OT  -Knee to be kept even in a foam leg elevator to relax sciatic nerve  -Pain: holding robaxin 4 times a day with MS changes, APAP PRN                 -oxycodone  to 2.5 mg                  -tramadol 25-50 mg every 6 hours PRN    Hx of unprovoked DVT in 2018   - on eliquis 5 mg bid     Type 2 diabetes mellitus  - Last hemoglobin A1c 5.6% on 6/16/2023.  On glipizide, metformin, Actos, and Victoza prior to hospitalizations and surgery.   - continue ISS    Constipation  -Continue Colace 100 mg twice daily  -Continue senna 1 tablet twice daily  -Continue MiraLAX daily     Hypomagnesemia  -monitor and replete      Severe malnutrition in the context of acute on chronic illness  Goal for patient is to consume % of meals TID. Goal is 9801-0210 kcals/daily. Protein needs- 100-126 grams/daily.   -RD consult and appreciate recs  -regular diet  -Weekly weights, daily I/O     Adjustment disorder with depressed mood  Prolonged grief disorder  Patient has been with depressed mood in setting of ongoing health issues, as evidenced by lack of motivation to work with therapies during both TCU admissions.  Past history of passive suicidal  "statements. Psychiatry saw patient during hospital admission on 5/31 due to lack of motivation with therapies. Patient declined interest in starting any psychiatric medications. Was willing to engage with health psychology.   -Outpatient health psychology consult  -Continue to monitor for willingness to start antidepressant therapy     Stable and Resolved Issues:  Hyperlipidemia-continue PTA Lipitor 40 mg daily  JAIR-CPAP when sleeping     Diet: Regular Diet Adult  Room Service    DVT Prophylaxis: DOAC  Tran Catheter: Not present  Lines: PRESENT      CVC Single Lumen Right Internal jugular Non - valved (open ended);Tunneled;Power injectable-Site Assessment: WDL      Cardiac Monitoring: None  Code Status: Full Code      Clinically Significant Risk Factors              # Hypoalbuminemia: Lowest albumin = 2.2 g/dL at 7/8/2023  8:51 AM, will monitor as appropriate    # Acute Kidney Injury, unspecified: based on a >150% or 0.3 mg/dL increase in last creatinine compared to past 90 day average, will monitor renal function         # Obesity: Estimated body mass index is 39.86 kg/m  as calculated from the following:    Height as of this encounter: 1.778 m (5' 10\").    Weight as of this encounter: 126 kg (277 lb 12.5 oz).   # Severe Malnutrition: based on nutrition assessment         Disposition Plan           Hector Burnham MD  Hospitalist Service, GOLD TEAM 21  [facility]  Securely message with [employer] (more info)  Text page via [employer] Paging/Directory   See signed in provider for up to date coverage information  ______________________________________________________________________    Interval History   Alertness improved. Reports some neck pain with lateral movement bilaterally.  Denies headache, cough, sob.  Pain in L hip    Physical Exam   Vital Signs: Temp: 98.1  F (36.7  C) Temp src: Oral BP: (!) 150/74 Pulse: 73   Resp: 16 SpO2: 95 % O2 Device: None (Room air)  "   Weight: 277 lbs 12.47 oz    Constitutional: lying in bed  CV: regular rate and rhythm, no murmurs rubs or gallops  Resp: crackles to auscultation bilaterally  Abd: soft, non-tender, non-distended, no hepatosplenomegaly or masses palpated, hernia reducible   Neuro: alert, oriented x2-3. CN grossly intact, WEEKS.     Medical Decision Making       50 MINUTES SPENT BY ME on the date of service doing chart review, history, exam, documentation & further activities per the note.      Data         Imaging results reviewed over the past 24 hrs:   No results found for this or any previous visit (from the past 24 hour(s)).

## 2023-07-09 NOTE — PROGRESS NOTES
Nephrology Progress Note  07/09/2023         Assessment & Recommendations:   A/Rec: 73yo M with persistent prosthetic joint infection of L hip admitted for MRSA bacteremia with recurrent HALEY.     #HALEY - Cr improved to 1.4 on 6/26 but has since risen to 4.05 yesterday, no labs today, likely secondary to hemodynamic instability and overdiuresis. Continued exposure to vanc could have contributed.   -No indications for RRT yet, will continue to monitor closely    Cr is stable over last 3 days  - recommend daily vanc levels  - Given current GFR, would reduce dose of Apixaban to 2.5 bid (order written for you)       #Electrolytes no indication for dialysis.    Hgb 8.2 stable.     Recommendations were communicated to primary team via this note and verbally    Kristopher Rodriguez MD  Division of Nephrology and Hypertension  Pager: [phone]  July 9, 2023      Interval History :   Reviewed provider and nursing notes for past 24 hours.   Patient alert  C/o L ankle/heel pain.  Denies CP, SOB      Physical Exam:   I/O last 3 completed shifts:  In: 800 [P.O.:800]  Out: -   Vitals: /43  Pulse 67  Resp 16  SpO2 99%  Temp 97.7  GENERAL APPEARANCE: awake, no distress  EYES:  no scleral icterus  PULM: no respiratory distress .   Lungs clear to lat exam  CV: regular rhythm, normal rate.  No rub/gallop heard  Abd obese, soft, non tender  Extremities: 2-3+ LE edema  NEURO: no myoclonus observed today.   Responses are slow, but appropriate.       Labs:   All labs reviewed by me  Electrolytes/Renal -   Recent Labs   Lab Test 07/09/23  1816 07/09/23  1145 07/09/23  1135 07/08/23  1647 07/08/23  0851 07/07/23  1256 07/07/23  1251 07/05/23  0823 07/05/23  0700 07/02/23  1754 07/02/23  1149 07/01/23  2208 07/01/23 2007   NA  --  140  --   --  140  --  141   < > 142   < > 137  --   --    POTASSIUM  --  3.7  --   --  3.6  --  3.7   < > 4.5   < > 4.3  --   --    CHLORIDE  --  110*  --   --  110*  --  110*   < > 108*   < > 107  --   --     CO2  --  19*  --   --  20*  --  20*   < > 16*   < > 21*  --   --    BUN  --  43.2*  --   --  45.2*  --  46.8*   < > 46.3*   < > 39.1*  --   --    CR  --  3.85*  --   --  4.02*  --  3.99*   < > 3.30*   < > 2.20*  --   --    * 186* 193*   < > 95   < > 91   < > 99   < > 128*   < >  --    MAIKOL  --  7.6*  --   --  7.3*  --  7.6*   < > 7.7*   < > 7.9*  --   --    MAG  --   --   --   --   --   --   --   --  1.7  --  1.7  --  1.5*   PHOS  --  4.3  --   --  4.3  --  4.5   < >  --   --   --   --   --     < > = values in this interval not displayed.       CBC -   Recent Labs   Lab Test 07/09/23  1145 07/08/23  0851 07/07/23  1251   WBC 11.8* 9.0 9.4   HGB 8.2* 7.5* 7.7*    286 310       LFTs -   Recent Labs   Lab Test 07/09/23  1145 07/08/23  0851 07/07/23  1251 07/06/23  1708 07/06/23  0713 07/04/23  0627 06/22/23  0711 06/20/23  0704   ALKPHOS  --   --   --   --  103 99  --  93   BILITOTAL  --   --   --   --  <0.2 0.2  --  0.2   ALT  --   --   --   --  12 14  --  14   AST  --   --   --   --  9 11  --  14   PROTTOTAL  --   --   --   --  5.3* 5.9*  --  5.9*   ALBUMIN 2.5* 2.2* 2.4*   < > 2.4*  2.4* 2.6*   < > 2.3*    < > = values in this interval not displayed.       Iron Panel -   Recent Labs   Lab Test 06/26/23  0513   IRON 30*   IRONSAT 21   FADI 2,616*         Imaging:  Reviewed     Current Medications:    apixaban ANTICOAGULANT  5 mg Oral BID     atorvastatin  40 mg Oral Daily     cefTAZidime  2 g Intravenous Q24H     ferrous sulfate  325 mg Oral Every Other Day     folic acid  1 mg Oral Daily     [Held by provider] furosemide  60 mg Oral Daily     heparin lock flush  5-20 mL Intracatheter Q24H     insulin aspart  1-7 Units Subcutaneous TID AC     insulin aspart  1-5 Units Subcutaneous At Bedtime     lactobacillus rhamnosus (GG)  1 capsule Oral BID     [Held by provider] methocarbamol  500 mg Oral 4x Daily     miconazole   Topical BID     multivitamin w/minerals  1 tablet Oral Daily     polyethylene glycol   17 g Oral BID     senna-docusate  1 tablet Oral BID     sodium chloride (PF)  10 mL Intracatheter Q8H     sodium chloride (PF)  10-40 mL Intracatheter Q8H     thiamine  500 mg Intravenous Daily     vancomycin place montilla - receiving intermittent dosing  1 each Intravenous See Admin Instructions       sodium chloride 50 mL/hr at 07/09/23 9337     Kristopher Rodriguez MD

## 2023-07-09 NOTE — PLAN OF CARE
Goal Outcome Evaluation:                    VS: Temp: 98  F (36.7  C) Temp src: Oral BP: (!) 163/77 Pulse: 75   Resp: 16 SpO2: 94 % O2 Device: None (Room air)      O2: stable on RA. LS clear and equal bilaterally. Denies chest pain and SOB.    Output: Voids spontaneously without difficulty / using beside urinal    Last BM: denies abdominal discomfort. BS active / passing flatus.    Activity: bedrest   Skin: WDL except, redness on the buttocks, swelling both legs, thigh, right hand   Pain: Pain managed with    CMS: Intact, AO2-3. Denies numbness and tingling.   Dressing:    Diet: Regular diet. Denies nausea/vomiting.   BG checks before meals and at bedtime. BG check at bedtime  BG overnight 90   LDA: PIV SL NS at 75 ml/hr   Equipment: IV pole, personal belongings,    Plan: Fall, bed alarm on, precautions maintained / Continue with plan of care. Call light within reach, pt able to make needs known.    Additional Info: Pt refused to turn, changed, refused to eat and take medications only was taking sips of water.

## 2023-07-09 NOTE — PHARMACY-VANCOMYCIN DOSING SERVICE
Pharmacy Vancomycin Note  Date of Service 2023  Patient's  1950   72 year old, male    Indication: Bacteremia  Day of Therapy: started 6/15  Current vancomycin regimen:  Intermittent dosing (last received 1500 mg x1 on )   Current vancomycin monitoring method: Trough (Method 2 = manual dose calculation)  Current vancomycin therapeutic monitoring goal: 15-20 mg/L    Current estimated CrCl = Estimated Creatinine Clearance: 22.5 mL/min (A) (based on SCr of 4.02 mg/dL (H)).    Creatinine for last 3 days  2023:  5:08 PM Creatinine 4.05 mg/dL  2023: 12:51 PM Creatinine 3.99 mg/dL  2023:  8:51 AM Creatinine 4.02 mg/dL    Recent Vancomycin Levels (past 3 days)  2023: 12:51 PM Vancomycin 20.1 ug/mL    Vancomycin IV Administrations (past 72 hours)                   vancomycin (VANCOCIN) 1,500 mg in 0.9% NaCl 250 mL intermittent infusion (mg) 1,500 mg New Bag 23 1742                Nephrotoxins and other renal medications (From now, onward)    Start     Dose/Rate Route Frequency Ordered Stop    23 0800  [Held by provider]  furosemide (LASIX) tablet 60 mg        (Held by provider since 2023 at 0943 by Hector Burnham MD.Hold Reason: Acute Kidney Injury)    60 mg Oral DAILY 23 1538      23 0813  vancomycin place montilla - receiving intermittent dosing         1 each Intravenous SEE ADMIN INSTRUCTIONS 23 0813               Contrast Orders - past 72 hours (72h ago, onward)    None          Interpretation of levels and current regimen:  Vancomycin level is reflective of supratherapeutic level    Has serum creatinine changed greater than 50% in last 72 hours: No    Urine output:  unable to determine    Renal Function: Stable     Plan:  1. Vanco level supratherapeutic. No doses today.   2. Vancomycin monitoring method: Trough (Method 2 = manual dose calculation)  3. Vancomycin therapeutic monitoring goal: 15-20 mg/L  4. Pharmacy will check vancomycin  levels as appropriate in 1-3 Days.  5. Serum creatinine levels will be ordered daily for the first week of therapy and at least twice weekly for subsequent weeks.    NILSON DAILY RPH

## 2023-07-09 NOTE — PLAN OF CARE
"VS: BP (!) 150/74 (BP Location: Left arm, Patient Position: Semi-Fuentes's, Cuff Size: Adult Large)   Pulse 73   Temp 98.1  F (36.7  C) (Oral)   Resp 16   Ht 1.778 m (5' 10\")   Wt 126 kg (277 lb 12.5 oz)   SpO2 95%   BMI 39.86 kg/m     O2: O2> 95% ORA, denies feeling SOB, lightheadesness  Lungs clear, equal bilateral    Output: Incontinent, attempted to void using bedside urinal - strains to void, frequently checks   Primofit catheter in place   Last BM: 7/7 - reported loose stools,    Activity: Ax2-3 with lift device for transfers and Ax2 for bed cares  Tremors present    Up for meals? Sits up for meals   Skin: L hip incision. Scattered bruising to BUE, swelling to BLE - refuses for anyone to touch L foot  Redness to outer groin areas - barrier cream applied  R hip redness - soap and water, dried, barrier cream applied    Author spoke to provider regarding bed and skin condition - Pulsate bed ordered from [employer]    Pain: Managed with PRN oxy and tramadol  Using oxy to re-medicate before turns    CMS: LLE numbness due to foot drop per pt report. A&O x3-4, disoriented to time, at times   Reports feeling \"fuzzy headed\" has a hard time finding words, slow clear, speech.  Oriented to self, situation, place, flat affect.    Dressing: LLE mepilex CDI  Mepilex on coccyx changed this shift   CVC dressing changed    Diet: Reg, denies nausea/vomiting   On glucose checks before meals   Needs assistance w/ ordering meals and set up and encouragement to eat     LDA: R CVC heparin locked - dressing changed today    Plan: Continue POC for pain management and intermittent antibiotics - continue frequent checks for incontinence    Additional Info: On RN managed potassium - did not have to be replaced today       "

## 2023-07-10 NOTE — PROGRESS NOTES
"Went to turn pt at 0500 did not leave till 0600. Pt refused to turn saying this is a new precedure referring to outer catheter. PT SAYS \" I Know t#1 is being done how about # 2. Explained that this is what we are doing to check and see if you have #2, but he says he cannot understand that and he cannot move till he understand. Pt explained we want to turn and prevent skin breakdown which can cause a lot of trouble healing. Pt says I understand about healing but I don't understand this. THE morning surgeon lady came to check and he was resistance by her looking the leg very guarded from pain. Pt told the surgeon there should be better communication with nurses because he is confused with turning. The surgeon said she will let the nurses know. I told the pt I HAD TO GO, he said \" go finish what you are doing and come without hurry. Will monitor.  "

## 2023-07-10 NOTE — PROGRESS NOTES
Nephrology Progress Note  07/10/2023         Assessment & Recommendations:   A/Rec: 71yo M with persistent prosthetic joint infection of L hip admitted for MRSA bacteremia with recurrent HALEY.     #HALEY - related to vancomycin, MRSA infection, hemodynamic instability and diuresis  After previous HALEY, creatinine had improved to 1.4 mg/dL on 6/26 but now running around 4 mg/dl  -Creatinine 4 mg/dL and stable  -No metabolic or volume indication for dialysis  Cr is stable over last 3 days  - recommend daily vanc levels  - medications adjusted for low GFR     # Low bicarb- bicarbonate currently 19 without recent blood gas.  - monitor    #Anemia- related to illness.  Hemoglobin is 8.2 and stable from yesterday.  Management per primary team    #Left hip prosthetic joint infection- MRSA.  Treated with vancomycin and ceftazidime.  Management per primary team and orthopedics.    Gypsy Muro MD  [facility]  Department of Medicine  Division of Nephrology and Hypertension  [employer]  Vocera Web Console       Interval History :   Reviewed provider and nursing notes for past 24 hours.  Appetite better today.  Still has a lot of pain in his left hip and is not participating in therapy also does not want to be turned.  He is not having any shortness of breath or chest pain.  No nausea or vomiting.  No fevers or chills.      Physical Exam:   I/O last 3 completed shifts:  In: 740 [P.O.:740]  Out: 725 [Urine:725]  Vitals: /43  Pulse 67  Resp 16  SpO2 99%  Temp 97.7  GENERAL APPEARANCE: awake, no distress  EYES:  no scleral icterus  PULM: Equal air movement and lungs clear bilaterally  CV: regular rhythm, normal rate.  No rub/gallop heard  Abd obese, soft, non tender  Extremities: 1-2+ LE edema  NEURO: Psychomotor slowing.  Face symmetric.  No asterixis      Labs:   All labs reviewed by me  Electrolytes/Renal -   Recent Labs   Lab Test 07/10/23  1316 07/10/23  1248 07/10/23  0744 07/09/23  1816  07/09/23  1145 07/08/23  1647 07/08/23  0851 07/05/23  0823 07/05/23  0700 07/02/23  1754 07/02/23  1149 07/01/23  2208 07/01/23 2007     --   --   --  140  --  140   < > 142   < > 137  --   --    POTASSIUM 3.7  --   --   --  3.7  --  3.6   < > 4.5   < > 4.3  --   --    CHLORIDE 111*  --   --   --  110*  --  110*   < > 108*   < > 107  --   --    CO2 19*  --   --   --  19*  --  20*   < > 16*   < > 21*  --   --    BUN 43.0*  --   --   --  43.2*  --  45.2*   < > 46.3*   < > 39.1*  --   --    CR 4.00*  --   --   --  3.85*  --  4.02*   < > 3.30*   < > 2.20*  --   --    * 138* 114*   < > 186*   < > 95   < > 99   < > 128*   < >  --    MAIKOL 7.7*  --   --   --  7.6*  --  7.3*   < > 7.7*   < > 7.9*  --   --    MAG  --   --   --   --   --   --   --   --  1.7  --  1.7  --  1.5*   PHOS 4.5  --   --   --  4.3  --  4.3   < >  --   --   --   --   --     < > = values in this interval not displayed.       CBC -   Recent Labs   Lab Test 07/10/23  1316 07/09/23  1145 07/08/23  0851   WBC 9.0 11.8* 9.0   HGB 8.2* 8.2* 7.5*    299 286       LFTs -   Recent Labs   Lab Test 07/10/23  1316 07/09/23  1145 07/08/23  0851 07/06/23  1708 07/06/23  0713 07/04/23  0627 06/22/23  0711 06/20/23  0704   ALKPHOS  --   --   --   --  103 99  --  93   BILITOTAL  --   --   --   --  <0.2 0.2  --  0.2   ALT  --   --   --   --  12 14  --  14   AST  --   --   --   --  9 11  --  14   PROTTOTAL  --   --   --   --  5.3* 5.9*  --  5.9*   ALBUMIN 2.4* 2.5* 2.2*   < > 2.4*  2.4* 2.6*   < > 2.3*    < > = values in this interval not displayed.       Iron Panel -   Recent Labs   Lab Test 06/26/23  0513   IRON 30*   IRONSAT 21   FADI 2,616*         Imaging:  Reviewed     Current Medications:    apixaban ANTICOAGULANT  2.5 mg Oral BID     atorvastatin  40 mg Oral Daily     cefTAZidime  2 g Intravenous Q24H     ferrous sulfate  325 mg Oral Every Other Day     folic acid  1 mg Oral Daily     [Held by provider] furosemide  60 mg Oral Daily     heparin  lock flush  5-20 mL Intracatheter Q24H     insulin aspart  1-7 Units Subcutaneous TID AC     insulin aspart  1-5 Units Subcutaneous At Bedtime     lactobacillus rhamnosus (GG)  1 capsule Oral BID     [Held by provider] methocarbamol  500 mg Oral 4x Daily     miconazole   Topical BID     multivitamin w/minerals  1 tablet Oral Daily     polyethylene glycol  17 g Oral BID     senna-docusate  1 tablet Oral BID     sodium chloride (PF)  10 mL Intracatheter Q8H     sodium chloride (PF)  10-40 mL Intracatheter Q8H     thiamine  500 mg Intravenous Daily     vancomycin place montilla - receiving intermittent dosing  1 each Intravenous See Admin Instructions       sodium chloride 50 mL/hr at 07/09/23 2176     Gypsy Muro MD

## 2023-07-10 NOTE — PROGRESS NOTES
"Orthopedic Surgery Progress Note: 07/10/2023    Subjective:   NAEON. Reviewed RN and consultant notes. Confusion improved. Creatinine still high but not indicated for dialysis. WBC wnl. CRP pending today. Incontinent. Continues on ceftaz, vanc  Eliquis for VTE    Objective:   BP (!) 155/58 (BP Location: Left arm)   Pulse 71   Temp 98  F (36.7  C) (Oral)   Resp 17   Ht 1.778 m (5' 10\")   Wt 126 kg (277 lb 12.5 oz)   SpO2 91%   BMI 39.86 kg/m    No intake/output data recorded.  General: NAD. Resting comfortably in bed.  Respiratory: Breathing comfortably on RA.  Musculoskeletal: Focused exam of the left lower extremity: Dressings c/d/i.  Fires GSC, TA, FHL, and EHL minimally. Incredibly sensitive to touch on feet. Overall decreased sensation distally. Brisk capillary refill.     Laboratory Data:  Lab Results   Component Value Date    WBC 11.8 (H) 07/09/2023    HGB 8.2 (L) 07/09/2023     07/09/2023      Intraoperative cultures   6/21/2023: MRSA, C Acnes, Pseudomonas    Assessment & Plan:   Waldo Bustos is a 72 year old male status post left revision total hip arthroplasty on 5/26/2023 with Dr. Meyers now presenting with left hip prosthetic joint infection status post left hip irrigation and debridement on 6/21/2023 with Dr. Meyers.    Postoperatively noted to have confusion and increasing creatinine. Thorough workup by primary, Neuro, renal suggests metabolic encephalopathy. Appears to be improving.    ABLA. Intermittent transfusions while hospitalized. Good response.    CRP downtrending. C acnes, MRSA in cultures. Treating with vanc/cefepime, per ID recs. Appreciate assistance.    Ortho will continue to follow every few days. Please do not hesitate to page with questions/concerns.    Goals for Today:  - Pain control  - Mobilization - appreciate therapy recs    Ortho Plan:  - Activity: Up with assist until independent. Posterior hip precautions x 3 months (no flexion beyond 90 degrees, no adduction " beyond midline, and no internal rotation beyond midline).  - Weightbearing Status: WBAT LLE.  - Pain Management: Transition from IV to PO as tolerated.  - Antibiotics: Vancomycin and ceftazidime, per ID/primary.  - Diet: OK for a diet from an orthopedic perspective.  - DVT Prophylaxis: SCDs and PTA apixaban okay from an ortho perspective  - Imaging: None pending.  - Labs: Labs PRN and per primary  - Bracing/Splinting: Abduction pillow while in bed.  - Dressings: Dressing changed 7/3.  - Drains: GUERRERO drain removed 7/3  - Tran Catheter: None. Defer to primary but consider  - Physical Therapy/Occupational Therapy: Evaluation and treatment.  - Cultures: Intraoperative cultures 6/21/2023.  - Follow-up: Clinic with Dr. Meyers's team TBD (pending dispo plan). Will plan to get XR at that time.    - Disposition: TCU. Okay to discharge from an ortho perspective.     Hannah Starr MD, PGY-4  Orthopedics  Pager: [phone]

## 2023-07-10 NOTE — PROGRESS NOTES
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  Postop course complicated by profound deconditioning.  He was admitted to TCU initially on 12/23/2022 for physical rehabilitation, however therapies no longer worked with patient after several months of an in bed therapy and his refusals to mobilize.  Underwent biopsy on 4/7/2023 without evidence of infection.  He was transferred back to [facility] for left total hip arthroplasty and reimplantation on 5/26/23 with Dr. Meyers.  Presented again to [place] for persistent MRSA bacteremia on 6/17.     Brief Hospital Summary:    Pt was admitted to continue infectious workup with MRSA bacteremia.  Admission blood cultures were positive, ID and Ortho were consulted. KEILA was obtained on 6/19, which was negative for endocarditis, ortho took for washout/debridement of hip on 6/21. ID recommending 6 weeks abx (6/21-8/1), patient recovering from HALEY, but given history of HALEY's and need for long term vanc plus comorbids making declining kidney function in the future probable nephrology doesn't feel comfortable with patient getting a PICC -- Tunneled internal jugular line placed. 7/4-7/9 course complicated by cefepime neurotoxicity limiting PO intake and participation in mobility.    Plan for today:  - continue MIVF with limited PO intake, assess daily  - Ongoing discussions about goals of care and participation in nutrition/therapy  - Ceftazidime and vanc to continue.   - follow up labs from this morning (not yet  collected)  - palliative consult for GOC, discussions ongoing - patient reports wanting to try taking adequate PO and participation in therapy.    - dose reduce apixaban for GFR at nephrology recommendation    AMS due to Delirium from illness/hospitalization and possible toxic metabolic encephalopathy, likely 2/2 cefepime neurotoxicity, improved  - off cefepime since 7/4 with improvement several days later. This is likely etiology.  - Nephrology following  - CT head, ammonia, vbg WNL.    - neurology consulted 7/6, EEG without seizures   - thiamine per neuro x5 days    MRSA bacteremia due to possible left hip septic arthritis vs other source Blood cultures: 6/14, 6/15 + for MRSA (4/4), and 6/16 (1/2 GPCs) and 6/17 (2/2) GPCs. - started vancomycin 6/15.   6/17 sinovial fluid cultures with staph and klebsiella. Purulent drainage noted at incision site at TCU, CT left hip concerning for infection (no mention of joint, mostly anterior in soft tissue) and couldn't exclude abscess/inf  - Continue vancomycin, added rocephin 6/21 and changed to cefepime 6/23, ceftazidime 7/4; ID recommends 6 weeks IV vanc (last day 8/1/23) and can transition to oral cipro 750 mg BID at discharge, needs weekly vanc level, cbcd, cmp, crp while on IV abx, regular ECG monitoring of QTc on high dose cipro --> follow up outpatient with ID prior to discontinuing antibiotics (needs appt end of July)  - blood cultures negative 6/19 and 6/20 (last + was 6/17)    Acute Kidney Injury - multifactorial, see below   Nephrology consulted, appreciate recs.   - baseline creatinine 1.0 or 1.1  - etiology: suspect combo of sepsis/inflammation, hypovolemia, blood loss anemia following surgery, vancomycin  - volume (hypovolemia/congestion): euvolemic but tough to assess based on habitus  - nephrotoxins: vancomycin  - diabetes: yes; last A1C < 6, though  - sepsis: resolved now  - anemia: yes, ongoing  - cardiac performance: normal based on KEILA from 6/19  -  inflammation: from surgery and infection  - creatinine downtrending; neph ordered C3 and 4, normal    Bilateral LE edema due to volume overload from HALEY and iatrogenic from blood transfusions  - HOLDING diuretics as above    Hypoxemic Respiratory Failure due to immobility, surgery - resolved  - continue to follow; encourage IS    Acute blood loss anemia following surgery, complicated by use of blood thinner  - 3 units day after surgery did joint washout (6/22) for acute blood loss anemia, 1 unit 6/25  - watch for bleeding; hold apixaban if need be - had unprovoked dvt in 2018 and would be on eliquis for life, high risk of recurrent DVT given recent procedures and illness/immobility  - iron and folic acid daily    S/p left hip explantation of left hip antibiotic spacer and revision of left total hip arthroplasty (5/26/2023)  - ortho performed washout 6/21; drain to come out in 2 weeks  -Activity: 50% PWB LLE with posterior hip precautions [id number]  -AFO ordered for foot drop but patient refusing  -PT/OT  -Knee to be kept even in a foam leg elevator to relax sciatic nerve  -Pain: holding robaxin 4 times a day with MS changes, APAP PRN                 -oxycodone  to 2.5 mg                  -tramadol 25-50 mg every 6 hours PRN    Hx of unprovoked DVT in 2018   - on eliquis 5 mg bid (dose reduced to 2.5 mg BID 7/10 with impaired renal function)     Type 2 diabetes mellitus  - Last hemoglobin A1c 5.6% on 6/16/2023.  On glipizide, metformin, Actos, and Victoza prior to hospitalizations and surgery.   - continue ISS    Constipation  -Continue Colace 100 mg twice daily  -Continue senna 1 tablet twice daily  -Continue MiraLAX daily     Hypomagnesemia  -monitor and replete      Severe malnutrition in the context of acute on chronic illness  Goal for patient is to consume % of meals TID. Goal is 4606-8350 kcals/daily. Protein needs- 100-126 grams/daily.   -RD consult and appreciate recs  -regular diet  -Weekly weights,  "daily I/O     Adjustment disorder with depressed mood  Prolonged grief disorder  Patient has been with depressed mood in setting of ongoing health issues, as evidenced by lack of motivation to work with therapies during both TCU admissions.  Past history of passive suicidal statements. Psychiatry saw patient during hospital admission on 5/31 due to lack of motivation with therapies. Patient declined interest in starting any psychiatric medications. Was willing to engage with health psychology.   -Outpatient health psychology consult  -Continue to monitor for willingness to start antidepressant therapy     Stable and Resolved Issues:  Hyperlipidemia-continue PTA Lipitor 40 mg daily  JAIR-CPAP when sleeping     Diet: Regular Diet Adult  Room Service    DVT Prophylaxis: DOAC  Tran Catheter: Not present  Lines: PRESENT      CVC Single Lumen Right Internal jugular Non - valved (open ended);Tunneled;Power injectable-Site Assessment: WDL      Cardiac Monitoring: None  Code Status: Full Code      Clinically Significant Risk Factors              # Hypoalbuminemia: Lowest albumin = 2.2 g/dL at 7/8/2023  8:51 AM, will monitor as appropriate    # Acute Kidney Injury, unspecified: based on a >150% or 0.3 mg/dL increase in last creatinine compared to past 90 day average, will monitor renal function         # Obesity: Estimated body mass index is 39.86 kg/m  as calculated from the following:    Height as of this encounter: 1.778 m (5' 10\").    Weight as of this encounter: 126 kg (277 lb 12.5 oz).   # Severe Malnutrition: based on nutrition assessment         Disposition Plan           Hector Burnham MD  Hospitalist Service, GOLD TEAM 21  [facility]  Securely message with [employer] (more info)  Text page via [employer] Paging/Directory   See signed in provider for up to date coverage information  ______________________________________________________________________    Interval History "   MS continues to improve. He is more engaged in the conversation. Wants to focus on trying to improve nutrition and doing exercises even when uncomfortable. Had a long discussion about goals of care moving forward and he is considering his options.  Denies headache, cough, sob.  Pain in L hip persists    Physical Exam   Vital Signs: Temp: 98.1  F (36.7  C) Temp src: Axillary BP: (!) 148/67 Pulse: 78   Resp: 18 SpO2: 97 % O2 Device: None (Room air)    Weight: 277 lbs 12.47 oz    Constitutional: lying in bed  CV: regular rate and rhythm, no murmurs rubs or gallops  Resp: coarse, conducted. distant  Abd: soft, non-tender, non-distended, no hepatosplenomegaly or masses palpated, hernia reducible   Neuro: alert, oriented x3. CN grossly intact, WEEKS.     Medical Decision Making       50 MINUTES SPENT BY ME on the date of service doing chart review, history, exam, documentation & further activities per the note.      Data     I have personally reviewed the following data over the past 24 hrs:    N/A  \   N/A   / N/A     N/A N/A N/A /  114 (H)   N/A N/A N/A \       ALT: N/A AST: N/A AP: N/A TBILI: N/A   ALB: N/A TOT PROTEIN: N/A LIPASE: N/A       Imaging results reviewed over the past 24 hrs:   No results found for this or any previous visit (from the past 24 hour(s)).

## 2023-07-10 NOTE — PLAN OF CARE
Goal Outcome Evaluation:    VS: Temp: 98  F (36.7  C) Temp src: Oral BP: (!) 155/58 Pulse: 71   Resp: 17 SpO2: 91 % O2 Device: None (Room air)      O2: stable on RA. LS clear and equal bilaterally. Denies chest pain and SOB.    Output: Voids spontaneously without difficulty/ using beside urinal/ incontinent most of the time. / now on external catheter to protect skin because pt refuses to change or turned, teaching done pt verbal understanding, says no do not touch me.   Last BM: 7/8/23 denies abdominal discomfort. BS active / passing flatus.    Activity: Bedrest, does not like to get up due to pain left leg, and whole body   Skin: WDL except, red on bottom per RN, refused assess and turning. General edema, wound left ankle Mapilex in place also on bottom    Pain: Pain managed with Oxy for turning and Ultram   CMS: Intact, AOx4. Denies numbness and tingling.   Dressing: Left hip C/D/I   Diet: Regular diet. Denies nausea/vomiting.   BG checks before meals and at bedtime. BG check at bedtime 102,  BG overnight 134   LDA: CVC NS AT 50 ML/ HR   Equipment: IV pole, personal belongings,    Plan: Fall bed alarm on precautions maintained / Continue with plan of care. Call light within reach, pt able to make needs known.    Additional Info: Continue plan of care, special mattress and bed ordered, expect them Monday. Pt ate late dinner at 0100, egg and piece of enchilada. Pt more talking this shift and agreed to take medication. Continue to remind importance of turning.

## 2023-07-10 NOTE — PLAN OF CARE
Goal Outcome Evaluation:      Plan of Care Reviewed With: patient    Overall Patient Progress: no change    VS: VSS. Denies CP/SOB. Somnolent this morning, slow to respond. At times has word finding difficulty. Intermittent confusion   O2: >90% on RA    Output: Voiding adequate amounts, incontinent. Purewick in place   Last BM: 7/7, incontinent   Activity: Assist of 2-3 with cares when pt allows. WBAT in LLE, pt sat edge of bed with PT today. Refuses repositioning. But did roll to get on hovermat to switch to new bed.     Skin: LLE wound and surgical incision, scattered bruising   Pain: Pain in LLE and neck, managing with PRN Tramadol and Tylenol   CMS: Edema in BLE   Dressing: CDI   Diet: Regular, tolerating okay. Needs help ordering meals, at times with eating (but also refuses). Encouragement with ordering meals and eating. BGs before meals, no sliding scale needed today.    Did well eating today, good appetite brother was very helpful.    LDA: CVC in R chest wall infusing    Equipment: IV pole, pulsate mattress    Plan: TBD   Additional Info: Brother was at bedside today  Palliative care spoke with patient and brother at bedside today

## 2023-07-10 NOTE — PROGRESS NOTES
"Brief SW Note:    SW met with pt at bedside, but just missed his brother, Ivan, who had been visiting. SW informed pt that SW has not been able to get a hold of Luke. SW inquired if Ivan and Pollo were both Financial POAs. Pt stated, \"I don't know.\" SW inquired if another MA for LTC application had been submitted. Pt stated, \"I guess so.\" SW asked if, alternatively, pt had spent down assets, per [facility]'s guidance i.e. sell personal vehicles. Pt stated, \"the [facility] can go screw themselves.\" SW provided education on LTC and eligibility for insurance to pay for PT/OT. Pt expressed not wanting to talk to anyone else today because people have been coming into his room every 5 mins. Pt gave permission for SW to communicated with Ivan; stated, \"you can tell him whatever.\"    SW updated pt's face sheet with Ivan's phone number.    1445: SW left  for Ivan, requested a call back re: MA for LTC application and/or assistance getting a hold of Luke.        Elvia Zaragoza, ANTONETTEW, LSW  [facility]   PHONE: [phone]  Pager: [phone]   "

## 2023-07-10 NOTE — PLAN OF CARE
VS: Temp: 97.6  F (36.4  C) Temp src: Oral BP: (!) 152/78 Pulse: 72   Resp: 16 SpO2: 96 % O2 Device: None (Room air)      O2: Pt.'s O2 sats above 90 on room air, denies shortness of breath and chest pain.    Output: Pt. Is incontinent of urine, he is wearing external urine collection device (male version of [employer])    Last BM: 07/07/2023, incontinent   Activity: Pt. Refuses repositioning. Weight bearing as tolerated in LLE. Previous shift assisted with cares requiring 2-3 to assist.    Skin: LLE wound, surgical incision. Scattered bruising on arms, legs abdomen. Edema in bilateral lower extremities, 2+. Skin intact around urine collection device, pt. Refused miconazole powder.     Pain: Pt. Reported pain of 10/10 in LLE, given 2.5 of oxy, tylenol, and tramadol at 2015.   CMS: A & O X4, pt speaks slowly and has trouble finding words, but is able to make needs known.    Dressing: LLE surgical incision, transparent and guaze dressing, CDI. CVC R chest, transparent dressing with anchor device and chlorhexidine patch CDI and patent.    Diet: Regular diet, blood sugar checks. Pt. Has poor appetite. Ate about 50% of meal at dinner.  denies nausea and vomiting. Blood glucose 131 at 2200, no bedtime insulin given.   LDA: R chest CVC, SL, L hip surgical incision    Equipment: IV pole, walker, gait belt, personal belongings.    Plan: Continue plan of care, pain management, maintain contact precautions for MRSA. Had consult with palliative care today, brother was at bedside during conversation.    Additional Info:

## 2023-07-10 NOTE — PROGRESS NOTES
[facility]  Palliative Care Daily Progress Note       Recommendations & Counseling       Consider pain consult if this is limiting his mobility, he is unable to sit straighter up in bed to eat and brother is helping assist him with eating, consider having assistance with eating until he is able to perform this more independently    Would consider health psychiatry once more mentally clear    Requests not being woken early in the morning, consider less checks to wake up over night and in very early morning if medically appropriate    Patient and Brother seem more on board with a short term tube feeding at this time    Unclear if patient would want dialysis at this time, was not able to discuss with him as the discussion around the feeding tube was quite challenging for him cognitively    Patient is not an independent decision maker for end of life decisions from a cognitive standpoint today and needs assistance from his Brother    He does wish to complete a HCD and I think he is clear enough to name a decision maker, which he wants Mcgarry to be the one but wants to keep Claire in the loop.           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 11/22/22 for a scheduled explant of the left hip prosthesis, debridement, and reconstruction with antibiotics spacer. His post operative course has been complicated by deconditioning. He was discharged to TCU on 12/23/22 and was subsequently readmitted on 5/26/23 for left total hip arthroplasty and reimplantation and discharged 6/2. He most recently presented again on 6/17 with MRSA bacteremia. His present hospital course has been complicated by HALEY and declining function likely needing dialysis, encephalopathy.      Today, the patient was seen  for:  Encephalopathy  HALEY  Bacteremia  Sepsis     Prognosis, Goals, or Advance Care Planning was addressed today with: Yes.  Mood, coping, and/or meaning in the context of serious illness were addressed today: Yes.  Summary/Comments: I visited with Rahat today. I introduced palliative care and ways in which we can be helpful including symptom management, decisional support (goals of care), and additional support. Rahat was more alert and interactive today however had many pauses and difficulty communicating and word finding. Discussed in the moments when he was more clear his thoughts about TF short term, he was consistent on not really wanting it. He was not able to explain his rationale for not wanting one and really didn't appreciate the consequences of not doing one. He does however think there are other ways to promote his nutrition without a feeding tube, and discussed we are doing that already. He didn't appreciate how long his eating has been quite poor. I saw him when he was eating and discussing the feeding tube certainly encouraged him to eat and he ate his whole tray. We also discussed dialysis, his brother is on it. He never was able to express his feelings about this. 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 Discussed the short term interventions (tube feedings and dialysis) being consistent with his goals. He seemed then after all of this discussion he said to do what was needed with regards to the feeding tube, and would accept one now. He was not able to state if he wants to undergo dialysis and wants to leave that up to Mcgarry. He was agreeable after all of this to me coming back to chat again later in the week.               Interval History:     Chart review/discussion with unit or clinical team members:   Notes reviewed,  confusion improving, no dialysis needed yet,     Per patient or family/caregivers today:  Seen in bed, eating breakfast, frequently would stare off and feel like he spaced out.     Key Palliative Symptoms:  We are not helping to manage these symptoms currently in this patient.               Review of Systems:     Besides above, ROS was reviewed and is unremarkable          Medications:     I have reviewed this patient's medication profile and medications during this hospitalization.           Physical Exam:   Vitals were reviewed  -  Temp: 98.1  F (36.7  C) Temp src: Axillary BP: (!) 148/67 Pulse: 78   Resp: 18 SpO2: 97 % O2 Device: None (Room air)      Intake/Output Summary (Last 24 hours) at 7/10/2023 0936  Last data filed at 7/10/2023 0555  Gross per 24 hour   Intake 840 ml   Output 500 ml   Net 340 ml                Data Reviewed:     Reviewed recent pertinent imaging, comments:   Head CT 7/4  Impression:   1. No acute intracranial pathology.   2. Periventricular white matter hypodensities which are nonspecific,   but likely representing chronic small vessel ischemic disease.   3. Moderate cerebral atrophy.     I have personally reviewed the examination and initial interpretation   and I agree with the findings.     JOSE COMER MD     Reviewed recent labs, comments:   Sodium 141, potassium 3.7  creatinine 4.00  WBC 9.0  Hemoglobin 8.2  Platelets 269      JOSEPH Rice CNS  [facility], Palliative Care  Securely message with the [employer] Web Console (learn more here) or  Text page via [facility] Paging/Directory     90 minutes spent chart reviewing, coordinating care with medical team, discussing goals of care with patient and family, providing education regarding medical options, and documenting.

## 2023-07-11 NOTE — PROGRESS NOTES
TCU [facility] filed [facility] reported.  Date/Time Submitted:   Tue Jul 11 2023 12:08:58      Web Report Number:  [id number]    ZEB talked to adult protection/ Ignacio CORDERO.  He said there is nothing they can do.  He will call Luke and tell him to have family buy the cars just to apply for MA.  He said [place] can 1-call Social Security to get a professional Rep Payee so TCU can get paid pt's portion of SS.  2-apply for Guardian/Conservatorship.   ZEB did talk to leadership about billing then eviction, then collections. Pt's vehicles are just junkers for parts for pt's one good van.       NEREYDA Sharp   [facility], Transitional Care Unit   Social Work   Formerly named [facility] [place] [address], [address] [place] [address]  () [phone]

## 2023-07-11 NOTE — PLAN OF CARE
Goal Outcome Evaluation:      Plan of Care Reviewed With: patient    VS: VSS   O2: SpO2 > 90% and stable on RA. LS clear and equal bilaterally. Denies chest pain and SOB.    Output: external urine collection device on    Last BM: 7/7/23   Activity: A 2 to turn, Not oob   Skin: LLE wound, surgical incision. Scattered bruising on arms, legs abdomen. Edema in bilateral lower extremities, 2+. Skin intact around urine collection device, pt. Refused miconazole powder.   Pain: Pain managed with prn oxy   CMS: A & O X4, pt speaks slowly and has trouble finding words   Dressing: L hip   Diet: Regular diet. Denies nausea/vomiting.    BG overnight 101   LDA: R chest CVC, SL, L hip surgical incision    Equipment: IV pole, personal belongings,    Plan: Contact precautions maintained. Continue with plan of care.     Additional Info: precautions for MRSA.

## 2023-07-11 NOTE — PHARMACY-VANCOMYCIN DOSING SERVICE
Pharmacy Vancomycin Note  Date of Service 2023  Patient's  1950   72 year old, male    Indication: Bacteremia  Day of Therapy: started 6/15  Current vancomycin regimen:  Intermittent dosing (last dose of 1,500 mg x1 on )  Current vancomycin monitoring method: Trough (Method 2 = manual dose calculation)  Current vancomycin therapeutic monitoring goal: 15-20 mg/L      Current estimated CrCl = Estimated Creatinine Clearance: 21 mL/min (A) (based on SCr of 4.3 mg/dL (H)).    Creatinine for last 3 days  2023:  8:51 AM Creatinine 4.02 mg/dL  2023: 11:45 AM Creatinine 3.85 mg/dL  7/10/2023:  1:16 PM Creatinine 4.00 mg/dL  2023:  6:56 AM Creatinine 4.30 mg/dL    Recent Vancomycin Levels (past 3 days)  2023: 11:45 AM Vancomycin 24.1 ug/mL  2023:  6:56 AM Vancomycin 20.7 ug/mL    Vancomycin IV Administrations (past 72 hours)      No vancomycin orders with administrations in past 72 hours.                Nephrotoxins and other renal medications (From now, onward)    Start     Dose/Rate Route Frequency Ordered Stop    23  vancomycin (VANCOCIN) 1,500 mg in 0.9% NaCl 250 mL intermittent infusion         1,500 mg  over 90 Minutes Intravenous ONCE 23 0846      23 0800  [Held by provider]  furosemide (LASIX) tablet 60 mg        (Held by provider since 2023 at 0943 by Hector Burnham MD.Hold Reason: Acute Kidney Injury)    60 mg Oral DAILY 23 1538      23 0813  vancomycin place montilla - receiving intermittent dosing         1 each Intravenous SEE ADMIN INSTRUCTIONS 23               Contrast Orders - past 72 hours (72h ago, onward)    None          Interpretation of levels and current regimen:  Vancomycin level is reflective of supratherapeutic level    Has serum creatinine changed greater than 50% in last 72 hours: No    Urine output:  unable to determine    Renal Function: Worsening      Plan:  1. Give 1,500 mg dose at 2000; AM  vanc level almost within trough goal and will require a dose today.   2. Vancomycin monitoring method: Trough (Method 2 = manual dose calculation)  3. Vancomycin therapeutic monitoring goal: 15-20 mg/L  4. Pharmacy will check vancomycin levels as appropriate in 1-3 Days.  5. Serum creatinine levels will be ordered a minimum of twice weekly.     Mike Denney RPH

## 2023-07-11 NOTE — PROGRESS NOTES
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  Postop course complicated by profound deconditioning.  He was admitted to TCU initially on 12/23/2022 for physical rehabilitation, however therapies no longer worked with patient after several months of an in bed therapy and his refusals to mobilize.  Underwent biopsy on 4/7/2023 without evidence of infection.  He was transferred back to [facility] for left total hip arthroplasty and reimplantation on 5/26/23 with Dr. Meyers.  Presented again to [place] for persistent MRSA bacteremia on 6/17.     Brief Hospital Summary:    Pt was admitted to continue infectious workup with MRSA bacteremia.  Admission blood cultures were positive, ID and Ortho were consulted. KEILA was obtained on 6/19, which was negative for endocarditis, ortho took for washout/debridement of hip on 6/21. ID recommending 6 weeks abx (6/21-8/1), patient recovering from HALEY, but given history of HALEY's and need for long term vanc plus comorbids making declining kidney function in the future probable nephrology doesn't feel comfortable with patient getting a PICC -- Tunneled internal jugular line placed. 7/4-7/9 course complicated by cefepime neurotoxicity limiting PO intake and participation in mobility.    Plan for today:  -Stop tramadol due to poor creat clearance, prior issues with encephalopathy (shouldn't do more often than every 8 hours per pharmacy)  -Increasing oxycodone range - still shouldn't do more often than every 6 hours per  pharmacy  -Encouraged working with therapy  -Discussed overall big picture ideas regarding how discharge dispositions work  -Will touch base with palliative care when he seems more open to further goals of care conversations  -No HD per renal today  -Ins/outs were about even but more hypertensive-- will stop 50 ml/h of fluids and monitor    AMS due to Delirium from illness/hospitalization and possible toxic metabolic encephalopathy, likely 2/2 cefepime neurotoxicity, improved  - off cefepime since 7/4 with improvement several days later. This is likely etiology.  - Nephrology following  - CT head, ammonia, vbg WNL.    - neurology consulted 7/6, EEG without seizures   - thiamine per neuro x5 days (completed)    MRSA bacteremia due to possible left hip septic arthritis vs other source Blood cultures: 6/14, 6/15 + for MRSA (4/4), and 6/16 (1/2 GPCs) and 6/17 (2/2) GPCs. - started vancomycin 6/15.   6/17 sinovial fluid cultures with staph and klebsiella. Purulent drainage noted at incision site at TCU, CT left hip concerning for infection (no mention of joint, mostly anterior in soft tissue) and couldn't exclude abscess/inf  - Continue vancomycin, added rocephin 6/21 and changed to cefepime 6/23, ceftazidime 7/4; ID recommends 6 weeks IV vanc (last day 8/1/23) and can transition to oral cipro 750 mg BID at discharge, needs weekly vanc level, cbcd, cmp, crp while on IV abx, regular ECG monitoring of QTc on high dose cipro --> follow up outpatient with ID prior to discontinuing antibiotics (needs appt end of July)  - blood cultures negative 6/19 and 6/20 (last + was 6/17)    Acute Kidney Injury - multifactorial, see below   Nephrology consulted, appreciate recs.   - baseline creatinine 1.0 or 1.1  - etiology: suspect combo of sepsis/inflammation, hypovolemia, blood loss anemia following surgery, vancomycin  - volume (hypovolemia/congestion): euvolemic but tough to assess based on habitus  - nephrotoxins: vancomycin  -  diabetes: yes; last A1C < 6, though  - sepsis: resolved now  - anemia: yes, ongoing  - cardiac performance: normal based on KEILA from 6/19  - inflammation: from surgery and infection  - creatinine downtrending; neph ordered C3 and 4, normal    Bilateral LE edema due to volume overload from HALEY and iatrogenic from blood transfusions  - HOLDING diuretics as above    Hypoxemic Respiratory Failure due to immobility, surgery - resolved  - continue to follow; encourage IS    Acute blood loss anemia following surgery, complicated by use of blood thinner  - 3 units day after surgery did joint washout (6/22) for acute blood loss anemia, 1 unit 6/25  - watch for bleeding; hold apixaban if need be - had unprovoked dvt in 2018 and would be on eliquis for life, high risk of recurrent DVT given recent procedures and illness/immobility  - iron and folic acid daily    S/p left hip explantation of left hip antibiotic spacer and revision of left total hip arthroplasty (5/26/2023)  - ortho performed washout 6/21  -Activity: 50% PWB LLE with posterior hip precautions [id number]  -AFO ordered for foot drop but patient refusing  -PT/OT  -Knee to be kept even in a foam leg elevator to relax sciatic nerve  -Pain: holding robaxin 4 times a day with MS changes, APAP PRN   -stop tramadol (7/11) due to renal function   -increase oxycodone 2.5-5 mg q6h PRN    Hx of unprovoked DVT in 2018   - on eliquis 5 mg bid (dose reduced to 2.5 mg BID 7/10 with impaired renal function)     Type 2 diabetes mellitus  - Last hemoglobin A1c 5.6% on 6/16/2023.  On glipizide, metformin, Actos, and Victoza prior to hospitalizations and surgery.   - continue ISS    Constipation  -Continue Colace 100 mg twice daily  -Continue senna 1 tablet twice daily  -Continue MiraLAX daily     Hypomagnesemia  -monitor and replete      Severe malnutrition in the context of acute on chronic illness  Goal for patient is to consume % of meals TID. Goal is 2971-9995 kcals/daily.  "Protein needs- 100-126 grams/daily.   -RD consult and appreciate recs  -regular diet  -Weekly weights, daily I/O     Adjustment disorder with depressed mood  Prolonged grief disorder  Patient has been with depressed mood in setting of ongoing health issues, as evidenced by lack of motivation to work with therapies during both TCU admissions.  Past history of passive suicidal statements. Psychiatry saw patient during hospital admission on 5/31 due to lack of motivation with therapies. Patient declined interest in starting any psychiatric medications. Was willing to engage with health psychology.   -Outpatient health psychology consult  -Continue to monitor for willingness to start antidepressant therapy     HTN: stopping IVF (were 50 ml/h) and will continue holding lasix due to his HALEY    Stable and Resolved Issues:  Hyperlipidemia-continue PTA Lipitor 40 mg daily  JAIR-CPAP when sleeping       Diet: Regular Diet Adult  Room Service    DVT Prophylaxis: DOAC  Tran Catheter: Not present  Lines: PRESENT      CVC Single Lumen Right Internal jugular Non - valved (open ended);Tunneled;Power injectable-Site Assessment: WDL      Cardiac Monitoring: None  Code Status: Full Code      Clinically Significant Risk Factors              # Hypoalbuminemia: Lowest albumin = 2.2 g/dL at 7/8/2023  8:51 AM, will monitor as appropriate    # Acute Kidney Injury, unspecified: based on a >150% or 0.3 mg/dL increase in last creatinine compared to past 90 day average, will monitor renal function         # Obesity: Estimated body mass index is 39.86 kg/m  as calculated from the following:    Height as of this encounter: 1.778 m (5' 10\").    Weight as of this encounter: 126 kg (277 lb 12.5 oz).   # Severe Malnutrition: based on nutrition assessment         Disposition Plan         Shelby Dubonformerly Janine Rust MD   Hospitalist Service, GOLD TEAM 21  [facility]  Securely message with [employer] " (more info)  Text page via [facility] Paging/Directory   See signed in provider for up to date coverage information  ______________________________________________________________________    Interval History   No events overnight. He states he is easily distracted during conversation and forgets what he was saying. Says he generally feels better but cannot specify what feels better. He feels frustrated when talking about discharge. Ate hurley and eggs for breakfast    Physical Exam   Vital Signs: Temp: 97.6  F (36.4  C) Temp src: Oral BP: (!) 154/84 Pulse: 75   Resp: 17 SpO2: 94 % O2 Device: None (Room air)    Weight: 277 lbs 12.47 oz    Constitutional: Awake, alert and in no distress. Sitting comfortably in bed  Head: Normocephalic. Atraumatic.   Respiratory: Normal respiratory rate and effort.   Musculoskeletal: moves slowly. Minimal movements of legs, neck.  Skin: line in right upper chest  Neuro: occasional tremor of hands  Psychiatric: slow to answer questions, stares off into space during conversation      Medical Decision Making     75 MINUTES SPENT BY ME on the date of service doing chart review, history, exam, documentation & further activities per the note.      Data   Recent Labs   Lab 07/11/23  1242 07/11/23  0656 07/11/23  0151 07/10/23  1727 07/10/23  1316 07/09/23  1816 07/09/23  1145 07/06/23  0750 07/06/23  0713   WBC  --  8.5  --   --  9.0  --  11.8*   < > 10.5   HGB  --  8.1*  --   --  8.2*  --  8.2*   < > 6.9*   MCV  --  96  --   --  95  --  96   < > 97   PLT  --  262  --   --  269  --  299   < > 307   NA  --  143  --   --  141  --  140   < > 140   POTASSIUM  --  3.7  --   --  3.7  --  3.7   < > 3.9   CHLORIDE  --  112*  --   --  111*  --  110*   < > 108*   CO2  --  20*  --   --  19*  --  19*   < > 21*   BUN  --  45.0*  --   --  43.0*  --  43.2*   < > 46.5*   CR  --  4.30*  --   --  4.00*  --  3.85*   < > 3.93*   ANIONGAP  --  11  --   --  11  --  11   < > 11   MAIKOL  --  8.1*  --   --  7.7*  --  7.6*    < > 7.8*   * 113* 101*   < > 158*   < > 186*   < > 108*   ALBUMIN  --  2.5*  --   --  2.4*  --  2.5*   < > 2.4*  2.4*   PROTTOTAL  --   --   --   --   --   --   --   --  5.3*   BILITOTAL  --   --   --   --   --   --   --   --  <0.2   ALKPHOS  --   --   --   --   --   --   --   --  103   ALT  --   --   --   --   --   --   --   --  12   AST  --   --   --   --   --   --   --   --  9    < > = values in this interval not displayed.

## 2023-07-11 NOTE — PROGRESS NOTES
Brief note  Seen briefly.   Creatinine stable, non-oliguric and no clear uremia  No indication for dialysis  Will assess daily  Gypsy Muro MD

## 2023-07-11 NOTE — PROGRESS NOTES
TCU ZEB filed [facility] reported.  Date/Time Submitted:   Tue Jul 11 2023 12:08:58      Web Report Number:  [id number]    ZEB talked to adult protection/ Ignacio CORDERO.  He said there is nothing they can do.  He will call Luke and tell him to have family buy the cars just to apply for MA.  He said [place] can 1-call Social Security to get a professional Rep Payee so TCU can get paid pt's portion of SS.  2-apply for Guardian/Conservatorship.   ZEB did talk to leadership about billing then eviction, then collections. Pt's vehicles are just junkers for parts for pt's one good van.             NEREYDA Sharp   [facility], Transitional Care Unit   Social Work   [facility] [place] [address], [address]th [place] [address]  () [phone]

## 2023-07-11 NOTE — PLAN OF CARE
Goal Outcome Evaluation:      Plan of Care Reviewed With: patient    Overall Patient Progress: no change     VS: Hypertensive- MD made aware. Pt asymptomatic. Other VSS. Denies CP/SOB. Slow to respond. At times has word finding difficulty. Intermittent confusion, oriented x4 today.    O2: >90% on RA    Output: Voiding adequate amounts, incontinent. Purewick in place   Last BM: 7/7, incontinent. Rolled in bed to sit on bedpan today- no stool but lots of gas per pt report.    Activity: Assist of 2-3 with cares when pt allows. WBAT in LLE. Refuses repositioning, even after education/encouragement.   Needs a lot of encouragement with moving/repositioning/cares, work slow with pt don't rush him- let him tell you how he wants you to help move him.     Skin: LLE wound and surgical incision, scattered bruising. Blanchable redness to buttocks/coccyx and groin.    Pain: Pain in LLE, managing with PRN Tramadol and Tylenol. Later was determined that pt should not be on Tramadol d/t kidney function- so pt switched to oxycodone. Refuses ice packs and heat packs.    CMS: Edema in BLE, intermittent neuropathy in bilateral hands per pt report.   Dressing: CDI   Diet: Regular, tolerating okay. Needs help ordering meals, at times with eating (but also refuses). Encouragement with ordering meals and eating. BGs before meals, no sliding scale given today.    LDA: CVC in R chest wall infusing    Equipment: IV pole, pulsate mattress, personal belongings (including wallet) in closet. Cell phone on bedside table   Plan: TBD   Additional Info:

## 2023-07-12 NOTE — PROGRESS NOTES
CLINICAL NUTRITION SERVICES - REASSESSMENT NOTE     Nutrition Prescription    RECOMMENDATIONS FOR MDs/PROVIDERS TO ORDER:  Pt wound benefit from WOC consult. Last saw WOC on 6/20 when refused.     Malnutrition Status:    Severe malnutrition in the context of acute on chronic illness    Recommendations already ordered by Registered Dietitian (RD):  Encouraged oral intakes  Ordered breakfast for pt, discontinued non-select food orders    Future/Additional Recommendations:  Monitor labs, intakes, and weight trends.     EVALUATION OF THE PROGRESS TOWARD GOALS   Diet: Regular  Intake/Tol[place]ace: 25 - 100% of documented meals.    Pt  receiving (on average) 2511 kcal and 103 g protein per day per [employer]. If eating 50% of meals on average, pt is likely meeting 70% minimum energy and 60% protein needs.    NEW FINDINGS/REVIEW OF SYSTEMS    Nutrition/GI: RD met with pt at bedside. Pt eating lunch at time of dinner. Pt feels he is eating more but it is not easy. Commended pt efforts to eat more. Discussed recommendation for tube feeding if pt consistently not eating, pt states he would not want a tube and would like to eat food instead. Has been ordering lunch and dinner when he does not like the food he is provided and would like to order these meals himself. Is agreeable to getting breakfast sent as he does not usually order this but will eat it.     Weights: Pt with previous 81 lb (25%) weight loss in 6 months, now with 48 lb (20%) weight gain in last two months. Unsure of accuracy of last two weights.   Wt Readings from Last Encounters:   07/11/23 132.2 kg (291 lb 6.4 oz)   06/28/23 126 kg (277 lb 12.5 oz)   06/16/23 115.2 kg (254 lb)   05/26/23 110.4 kg (243 lb 4.8 oz)   05/19/23 110.4 kg (243 lb 4.8 oz)   03/23/23 117 kg (257 lb 14.4 oz)   01/09/23 119.7 kg (264 lb)   11/22/22 147.1 kg (324 lb 4.8 oz)   11/14/22 142.9 kg (315 lb)   11/09/22 146.8 kg (323 lb 9.6 oz)   05/19/22 136.1 kg (300 lb)   07/03/18 148.9 kg  (328 lb 4.8 oz)     Skin: Surgical incisions, pressure injury on back/groin (pt refusing most assessments)     Labs reviewed   07/10/23 13:16 07/11/23 06:56 07/12/23 09:11   Sodium 141 143 143   Potassium 3.7 3.7 4.1   Urea Nitrogen 43.0 (H) 45.0 (H) 43.6 (H)   Creatinine 4.00 (H) 4.30 (H) 4.44 (H)   GFR Estimate 15 (L) 14 (L) 13 (L)   Magnesium   1.6 (L)   Phosphorus 4.5 5.1 (H) 5.0 (H)   Albumin 2.4 (L) 2.5 (L) 2.7 (L)   CRP Inflammation 32.85 (H)     Glucose 158 (H) 113 (H) 105 (H)      Medications reviewed: Ceftazidime, ferrous sulfate, folic acid, culturell, multivitamin, miralax, senna, thiamine     MALNUTRITION  % Intake: </=75% for >/= 1 month (severe)  % Weight Loss: > 10% in 6 months (severe)  Subcutaneous Fat Loss: Upper arm:  Moderate-severe  Muscle Loss: Temporal: Mild, Thoracic region (clavicle, acromium bone, deltoid, trapezius, pectoral):  Moderate and Upper arm (bicep, tricep):  Severe  Fluid Accumulation/Edema: Mild  Malnutrition Diagnosis: Severe malnutrition in the context of chronic illness    Previous Goals   Patient to consume % of nutritionally adequate meal trays TID, or the equivalent with supplements/snacks.  Evaluation: Not met    Previous Nutrition Diagnosis  Inadequate oral intake related to poor appetite, altered mental status, as evidenced by RN report and documented intakes.  Evaluation: Improving    CURRENT NUTRITION DIAGNOSIS  Inadequate oral intake related to poor appetite, menu fatigue, altered mental status as evidenced by RN report and documented intakes.    INTERVENTIONS  Implementation  Nutrition education for nutrition relationship to health/disease   Modify composition of meals/snacks     Goals  Patient to consume % of nutritionally adequate meal trays TID, or the equivalent with supplements/snacks.    Monitoring/Evaluation  Progress toward goals will be monitored and evaluated per protocol.    eDe Alanis MS, RDN, LD  RD pager: [phone]  WB Weekend/Holiday  Pager: [phone]

## 2023-07-12 NOTE — CARE PLAN
"  VS: BP (!) 173/70 (BP Location: Left arm)   Pulse 82   Temp 97.9  F (36.6  C) (Oral)   Resp 16   Ht 1.778 m (5' 10\")   Wt 132.2 kg (291 lb 6.4 oz)   SpO2 100%   BMI 41.81 kg/m       O2:  RA , stable, denies chest pain   Output: Voiding adequate amounts, incontinent. Purewick in place   Last BM: 7/7, incontinent.  Passing gas per patient, stool softeners administered   Activity: Assist of 3 with cares when pt allows. WBAT in LLE. Refuses repositioning, even after education/encouragement.     Skin: LLE wound and surgical incision, scattered bruising. Blanchable redness to buttocks/coccyx and groin.    Pain: Pain in LLE, managed with PRN oxycodone.   CMS: Edema in BLE, denies tingling   Dressing: CDI   Diet: Regular   LDA: CVC in R chest infusing   Equipment: IV pole, pulsate mattress, personal belongings (including wallet) in closet. Cell phone on bedside table   Plan: Continue plan of care.       "

## 2023-07-12 NOTE — PROGRESS NOTES
pt L.D in 552  ortho BP is elevated. last checked BP was 180/78. pt is  asymptomatic. please advice?   Rosa Maria RN, [id number]

## 2023-07-12 NOTE — PROGRESS NOTES
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  Postop course complicated by profound deconditioning.  He was admitted to TCU initially on 12/23/2022 for physical rehabilitation, however therapies no longer worked with patient after several months of an in bed therapy and his refusals to mobilize.  Underwent biopsy on 4/7/2023 without evidence of infection.  He was transferred back to [facility] for left total hip arthroplasty and reimplantation on 5/26/23 with Dr. Meyesr.  Presented again to [place] for persistent MRSA bacteremia on 6/17.     Brief Hospital Summary:    Pt was admitted to continue infectious workup with MRSA bacteremia.  Admission blood cultures were positive, ID and Ortho were consulted. KEILA was obtained on 6/19, which was negative for endocarditis, ortho took for washout/debridement of hip on 6/21. ID recommending 6 weeks abx (6/21-8/1), patient recovering from HALEY, but given history of HALEY's and need for long term vanc plus comorbids making declining kidney function in the future probable nephrology doesn't feel comfortable with patient getting a PICC -- Tunneled internal jugular line placed. 7/4-7/9 course complicated by cefepime neurotoxicity limiting PO intake and participation in mobility.    Plan for today:  -resume PO diuretic  -increase senna due to lack of BM  -renal function stable/no need for HD today    AMS due to Delirium from illness/hospitalization and possible toxic metabolic encephalopathy, likely 2/2 cefepime  neurotoxicity, improved  - off cefepime since 7/4 with improvement several days later. This is likely etiology.  - Nephrology following  - CT head, ammonia, vbg WNL.    - neurology consulted 7/6, EEG without seizures   - thiamine per neuro x5 days (completed)    MRSA bacteremia due to possible left hip septic arthritis vs other source Blood cultures: 6/14, 6/15 + for MRSA (4/4), and 6/16 (1/2 GPCs) and 6/17 (2/2) GPCs. - started vancomycin 6/15.   6/17 sinovial fluid cultures with staph and klebsiella. Purulent drainage noted at incision site at TCU, CT left hip concerning for infection (no mention of joint, mostly anterior in soft tissue) and couldn't exclude abscess/inf  - Continue vancomycin, added rocephin 6/21 and changed to cefepime 6/23, ceftazidime 7/4; ID recommends 6 weeks IV vanc (last day 8/1/23) and can transition to oral cipro 750 mg BID at discharge, needs weekly vanc level, cbcd, cmp, crp while on IV abx, regular ECG monitoring of QTc on high dose cipro --> follow up outpatient with ID prior to discontinuing antibiotics (needs appt end of July)  - blood cultures negative 6/19 and 6/20 (last + was 6/17)    Acute Kidney Injury - multifactorial, see below   Nephrology consulted, appreciate recs.   - baseline creatinine 1.0 or 1.1  - etiology: suspect combo of sepsis/inflammation, hypovolemia, blood loss anemia following surgery, vancomycin  - volume (hypovolemia/congestion): euvolemic but tough to assess based on habitus  - nephrotoxins: vancomycin  - diabetes: yes; last A1C < 6, though  - sepsis: resolved now  - anemia: yes, ongoing  - cardiac performance: normal based on KEILA from 6/19  - inflammation: from surgery and infection  - creatinine downtrending; neph ordered C3 and 4, normal    Bilateral LE edema due to volume overload from HALEY and iatrogenic from blood transfusions  -Resuming diuresis 7/12    Hypoxemic Respiratory Failure due to immobility, surgery - resolved  - continue to follow;  encourage IS    Acute blood loss anemia following surgery, complicated by use of blood thinner  - 3 units day after surgery did joint washout (6/22) for acute blood loss anemia, 1 unit 6/25  - watch for bleeding; hold apixaban if need be - had unprovoked dvt in 2018 and would be on eliquis for life, high risk of recurrent DVT given recent procedures and illness/immobility  - iron and folic acid daily    S/p left hip explantation of left hip antibiotic spacer and revision of left total hip arthroplasty (5/26/2023)  - ortho performed washout 6/21  -Activity: 50% PWB LLE with posterior hip precautions [id number]  -AFO ordered for foot drop but patient refusing  -PT/OT  -Knee to be kept even in a foam leg elevator to relax sciatic nerve  -Pain: holding robaxin 4 times a day with MS changes, APAP PRN   -stop tramadol (7/11) due to renal function   -increase oxycodone 2.5-5 mg q6h PRN    Hx of unprovoked DVT in 2018   - on eliquis 5 mg bid (dose reduced to 2.5 mg BID 7/10 with impaired renal function)     Type 2 diabetes mellitus  - Last hemoglobin A1c 5.6% on 6/16/2023.  On glipizide, metformin, Actos, and Victoza prior to hospitalizations and surgery.   - continue ISS    Constipation  -Continue Colace 100 mg twice daily  -Continue senna 1 tablet twice daily  -Continue MiraLAX daily     Hypomagnesemia  -monitor and replete      Severe malnutrition in the context of acute on chronic illness  Goal for patient is to consume % of meals TID. Goal is 6635-3755 kcals/daily. Protein needs- 100-126 grams/daily.   -RD consult and appreciate recs  -regular diet  -Weekly weights, daily I/O     Adjustment disorder with depressed mood  Prolonged grief disorder  Patient has been with depressed mood in setting of ongoing health issues, as evidenced by lack of motivation to work with therapies during both TCU admissions.  Past history of passive suicidal statements. Psychiatry saw patient during hospital admission on 5/31 due to lack  "of motivation with therapies. Patient declined interest in starting any psychiatric medications. Was willing to engage with health psychology.   -Outpatient health psychology consult  -Continue to monitor for willingness to start antidepressant therapy     HTN: stopping low dose IVF (7/11), starting diuresis 7/12    Stable and Resolved Issues:  Hyperlipidemia-continue PTA Lipitor 40 mg daily  JAIR-CPAP when sleeping         Diet: Regular Diet Adult  Room Service    DVT Prophylaxis: DOAC  Tran Catheter: Not present  Lines: PRESENT      CVC Single Lumen Right Internal jugular Non - valved (open ended);Tunneled;Power injectable-Site Assessment: WDL      Cardiac Monitoring: None  Code Status: Full Code      Clinically Significant Risk Factors              # Hypoalbuminemia: Lowest albumin = 2.2 g/dL at 7/8/2023  8:51 AM, will monitor as appropriate    # Acute Kidney Injury, unspecified: based on a >150% or 0.3 mg/dL increase in last creatinine compared to past 90 day average, will monitor renal function         # Severe Obesity: Estimated body mass index is 41.81 kg/m  as calculated from the following:    Height as of this encounter: 1.778 m (5' 10\").    Weight as of this encounter: 132.2 kg (291 lb 6.4 oz).   # Severe Malnutrition: based on nutrition assessment         Disposition Plan    TBD       Shelby Rust MD  Hospitalist Service, GOLD TEAM 21  [facility]  Securely message with [employer] (more info)  Text page via [facility] Paging/Directory   See signed in provider for up to date coverage information  ______________________________________________________________________    Interval History   No fever/chills. He doesn't have much to say. He was sleeping most of the morning. He denied that his pain is not controlled by meds.  RN says he hasn't had BM since 7/7. Has been taking his bowel meds.   No events overnight.     Physical Exam   Vital Signs: Temp: 97.9  F (36.6  C) " Temp src: Oral BP: (!) 161/74 Pulse: 69   Resp: 16 SpO2: 98 % O2 Device: None (Room air)    Weight: 291 lbs 6.4 oz    Constitutional: Awake, alert and in no distress. Sitting comfortably in bed   Head: Normocephalic. Atraumatic.   Cardiovascular: Regular rate and rhythm. No murmurs, clicks, gallops or rubs.   Respiratory: Clear to auscultation without wheezes or crackles. Normal respiratory rate and effort.   Gastrointestinal: Abdomen soft, non-tender. BS normal.   Psychiatric: very flat affect    Medical Decision Making   45 MINUTES SPENT BY ME on the date of service doing chart review, history, exam, documentation & further activities per the note.      Data   Recent Labs   Lab 07/12/23  1209 07/12/23  0911 07/12/23  0144 07/11/23  1242 07/11/23  0656 07/10/23  1727 07/10/23  1316 07/09/23  1816 07/09/23  1145 07/06/23  0750 07/06/23  0713   WBC  --   --   --   --  8.5  --  9.0  --  11.8*   < > 10.5   HGB  --   --   --   --  8.1*  --  8.2*  --  8.2*   < > 6.9*   MCV  --   --   --   --  96  --  95  --  96   < > 97   PLT  --   --   --   --  262  --  269  --  299   < > 307   NA  --  143  --   --  143  --  141  --  140   < > 140   POTASSIUM  --  4.1  --   --  3.7  --  3.7  --  3.7   < > 3.9   CHLORIDE  --  112*  --   --  112*  --  111*  --  110*   < > 108*   CO2  --  19*  --   --  20*  --  19*  --  19*   < > 21*   BUN  --  43.6*  --   --  45.0*  --  43.0*  --  43.2*   < > 46.5*   CR  --  4.44*  --   --  4.30*  --  4.00*  --  3.85*   < > 3.93*   ANIONGAP  --  12  --   --  11  --  11  --  11   < > 11   MAIKOL  --  8.3*  --   --  8.1*  --  7.7*  --  7.6*   < > 7.8*   GLC 90 105* 101*   < > 113*   < > 158*   < > 186*   < > 108*   ALBUMIN  --  2.7*  --   --  2.5*  --  2.4*  --  2.5*   < > 2.4*  2.4*   PROTTOTAL  --   --   --   --   --   --   --   --   --   --  5.3*   BILITOTAL  --   --   --   --   --   --   --   --   --   --  <0.2   ALKPHOS  --   --   --   --   --   --   --   --   --   --  103   ALT  --   --   --   --   --    --   --   --   --   --  12   AST  --   --   --   --   --   --   --   --   --   --  9    < > = values in this interval not displayed.

## 2023-07-12 NOTE — PLAN OF CARE
"  VS: BP (!) 161/74 (BP Location: Left arm, Patient Position: Semi-Fuentes's, Cuff Size: Adult Large)   Pulse 69   Temp 97.9  F (36.6  C) (Oral)   Resp 16   Ht 1.778 m (5' 10\")   Wt 132.2 kg (291 lb 6.4 oz)   SpO2 98%   BMI 41.81 kg/m      HTN- MD notified and instructed to keep monitoring.   O2: stable on room air>90%. Denies SOB   Output: External urine collection  In place. Draining adequate amount   Last BM: 7/11/23.bowel active and audible. Denies abdominal discomfort.    Activity: Not OOB.refuses repositioning.    Assist of 2-3  With transfer   Skin: Left hip surgical incision, edema in BLE 2+, Scattered bruises to bilat arms.    Pain: Managed with Oxycodone and Tylenol PRN   CMS: AXO x4. Slow to respond, word finding at times.    Dressing: Left hip-CDI   Diet: Reg diet. Poor appetite. 2AM    LDA: CVC to right chest wall - saline locked   Equipment: Iv pole and pump, personal belongings   Plan: continue with POC   Additional Info:                             "

## 2023-07-12 NOTE — PROGRESS NOTES
"  VS: BP (!) 161/74 (BP Location: Left arm, Patient Position: Semi-Fuentes's, Cuff Size: Adult Large)   Pulse 69   Temp 97.9  F (36.6  C) (Oral)   Resp 16   Ht 1.778 m (5' 10\")   Wt 132.2 kg (291 lb 6.4 oz)   SpO2 98%   BMI 41.81 kg/m     O2: Stable on RA denied SOB & CP   Output: Incontinent b/b, purewick   Last BM: 7/7/23   Activity: Liko Lift, bed rest   Skin: Groin & sacral pressure injuries, surgical incision L hip   Pain: Denied   CMS: Oriented to self only. Oriented to place and situation after reminded, very slow to respond and confusion present. Denied N/T   Dressing: CDI   Diet: Reg/thin, BG 4x/day, poor diet   LDA: CVC R chest wall single lumen SL   Equipment: Personal belongings   Plan: SW working on medical assistance for TCU to get paid for pt's portion of SS   Additional Info:          "

## 2023-07-12 NOTE — PLAN OF CARE
Physical Therapy Discharge Summary    Reason for therapy discharge:    No further expectations of functional progress.    Progress towards therapy goal(s). See goals on Care Plan in [facility] electronic health record for goal details.  Goals not met.  Barriers to achieving goals:   limited tolerance for therapy.    Therapy recommendation(s):    No further therapy is recommended.  Pt has history of not participating in therapy throughout recent acute and rehab admissions. PT was trialed this admission as pt certainly has significant deficits. However, pt continued to either not participate at all or be extremely self-limiting. In nearly 4 weeks of therapy here has not made any progress, continues to require OH lift, only sat EOB a a few times otherwise dependent transfers to recliner and does not participate in LE exercises. Given lack of participation and progress therapy indications no longer skilled. Pt has not stood since Nov 2022 and unfortunately does not have a good overall rehab prognosis. These behavior patterns have been consistent while pt at [place] [facility] (see PT/OT notes from 6/18 and 6/19). PT will be completing orders at this time as pt is not making functional progress, very limited tolerance for therapy, overall poor rehab prognosis to stand or walk again, recommend LTC for discharge placement. As far as mobility recommendations, continue to utilize OH lift with extension loop for L LE. Ideally up to chair x2 daily, but understand pt may not tolerate this.

## 2023-07-13 NOTE — PROGRESS NOTES
Nephrology Progress Note  07/13/2023         Assessment & Recommendations:   A/Rec: 73yo M with persistent prosthetic joint infection of L hip admitted for MRSA bacteremia with recurrent HALEY.     #HALEY - related to vancomycin, MRSA infection, hemodynamic instability and diuresis  After previous HALEY, creatinine had improved to 1.4 mg/dL on 6/26 but now running around 4 mg/dl  Creatinine creeping up slowly. 4.6 mg/dl today  -No metabolic or volume indication for dialysis  - vanco monitoring per pharmacy  - medications adjusted for low GFR    # HTN/volume - weight up from baseline with IVF  - IVF stopped 7/12/13  - furosemide 60 mg po daily resumed 7/12/13  - please increase furosemide to 60 mg po bid     # Low bicarb- bicarbonate currently 19 and stable without recent blood gas.  - monitor    #Anemia- related to illness.  Hemoglobin is 8.1 on 7/11/23 and stable     #Left hip prosthetic joint infection- MRSA.  Treated with vancomycin and ceftazidime.  Management per primary team and orthopedics.    Gypsy Muro MD  [facility]  Department of Medicine  Division of Nephrology and Hypertension  [facility]  Vocera Web Console       Interval History :   Reviewed provider and nursing notes for past 24 hours.  Significantly slow response, single word answers. May be having diarrhea, no CP, no dyspnea, no N/V. Appetite low. Ongoing abdominal bloating. Legs feel heavy. Not aware of edema that is visible in arms and legs. Did notice increased UOP after furosemide yesterday    Physical Exam:   I/O last 3 completed shifts:  In: 250 [P.O.:250]  Out: 450 [Urine:450]  Vitals: /43  Pulse 67  Resp 16  SpO2 99%  Temp 97.7  GENERAL APPEARANCE: awake, no distress  EYES:  no scleral icterus  PULM: Equal air movement and lungs clear bilaterally  CV: regular rhythm, normal rate.  No rub/gallop heard  Abd obese, soft, non tender  Extremities: 1-2+ LE edema of arms and legs  NEURO: Psychomotor slowing.  Face  symmetric.  No asterixis      Labs:   All labs reviewed by me  Electrolytes/Renal -   Recent Labs   Lab Test 07/13/23  1143 07/13/23  1059 07/13/23  0838 07/12/23  1209 07/12/23  0911 07/11/23  1242 07/11/23  0656 07/05/23  0823 07/05/23  0700 07/02/23  1754 07/02/23  1149   NA  --   --  143  --  143  --  143   < > 142   < > 137   POTASSIUM  --   --  3.8  --  4.1  --  3.7   < > 4.5   < > 4.3   CHLORIDE  --   --  113*  --  112*  --  112*   < > 108*   < > 107   CO2  --   --  19*  --  19*  --  20*   < > 16*   < > 21*   BUN  --   --  45.5*  --  43.6*  --  45.0*   < > 46.3*   < > 39.1*   CR  --   --  4.59*  --  4.44*  --  4.30*   < > 3.30*   < > 2.20*   * 91 100*   < > 105*   < > 113*   < > 99   < > 128*   MAIKOL  --   --  8.1*  --  8.3*  --  8.1*   < > 7.7*   < > 7.9*   MAG  --   --   --   --  1.6*  --   --   --  1.7  --  1.7   PHOS  --   --  4.7*  --  5.0*  --  5.1*   < >  --   --   --     < > = values in this interval not displayed.       CBC -   Recent Labs   Lab Test 07/11/23  0656 07/10/23  1316 07/09/23  1145   WBC 8.5 9.0 11.8*   HGB 8.1* 8.2* 8.2*    269 299       LFTs -   Recent Labs   Lab Test 07/13/23  0838 07/12/23  0911 07/11/23  0656 07/06/23  1708 07/06/23  0713 07/04/23  0627 06/22/23  0711 06/20/23  0704   ALKPHOS  --   --   --   --  103 99  --  93   BILITOTAL  --   --   --   --  <0.2 0.2  --  0.2   ALT  --   --   --   --  12 14  --  14   AST  --   --   --   --  9 11  --  14   PROTTOTAL  --   --   --   --  5.3* 5.9*  --  5.9*   ALBUMIN 2.5* 2.7* 2.5*   < > 2.4*  2.4* 2.6*   < > 2.3*    < > = values in this interval not displayed.       Iron Panel -   Recent Labs   Lab Test 06/26/23  0513   IRON 30*   IRONSAT 21   FADI 2,616*         Imaging:  Reviewed     Current Medications:    apixaban ANTICOAGULANT  2.5 mg Oral BID     atorvastatin  40 mg Oral Daily     cefTAZidime  2 g Intravenous Q24H     ferrous sulfate  325 mg Oral Every Other Day     folic acid  1 mg Oral Daily     furosemide  60 mg  Oral Daily     heparin lock flush  5-20 mL Intracatheter Q24H     insulin aspart  1-7 Units Subcutaneous TID AC     insulin aspart  1-5 Units Subcutaneous At Bedtime     lactobacillus rhamnosus (GG)  1 capsule Oral BID     [Held by provider] methocarbamol  500 mg Oral 4x Daily     miconazole   Topical BID     multivitamin w/minerals  1 tablet Oral Daily     polyethylene glycol  17 g Oral BID     senna-docusate  2 tablet Oral BID     sodium chloride (PF)  10 mL Intracatheter Q8H     sodium chloride (PF)  10-40 mL Intracatheter Q8H     vancomycin place montilla - receiving intermittent dosing  1 each Intravenous See Admin Instructions       Gypsy Muro MD

## 2023-07-13 NOTE — PLAN OF CARE
"  VS: BP (!) 159/76 (BP Location: Left arm)   Pulse 74   Temp 97.6  F (36.4  C) (Oral)   Resp 16   Ht 1.778 m (5' 10\")   Wt 132.2 kg (291 lb 6.4 oz)   SpO2 96%   BMI 41.81 kg/m       Output: Incontinent of bowel and bladder   Last BM: 7/13   Activity: None. Pt refuses repositioning   Skin: Redness on buttocks and groin. Paste and powder utilized   Pain: Pt C/O pain in left leg and hip   CMS: A/O with confusion and difficulty word finding   Dressing: Left hip dressing- CDI   Diet: Reg diet   LDA: Right CVC- heparin locked   Equipment:    Plan: Palliative came to see pt. Today.  Pt will call appropriately.    Additional Info:         "

## 2023-07-13 NOTE — PROGRESS NOTES
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  Postop course complicated by profound deconditioning.  He was admitted to TCU initially on 12/23/2022 for physical rehabilitation, however therapies no longer worked with patient after several months of an in bed therapy and his refusals to mobilize.  Underwent biopsy on 4/7/2023 without evidence of infection.  He was transferred back to [facility] for left total hip arthroplasty and reimplantation on 5/26/23 with Dr. Meyers.  Presented again to [place] for persistent MRSA bacteremia on 6/17.     Brief Hospital Summary:    Pt was admitted to continue infectious workup with MRSA bacteremia.  Admission blood cultures were positive, ID and Ortho were consulted. KEILA was obtained on 6/19, which was negative for endocarditis, ortho took for washout/debridement of hip on 6/21. ID recommending 6 weeks abx (6/21-8/1), patient recovering from HALEY, but given history of HALEY's and need for long term vanc plus comorbids making declining kidney function in the future probable nephrology doesn't feel comfortable with patient getting a PICC -- Tunneled internal jugular line placed. 7/4-7/9 course complicated by cefepime neurotoxicity limiting PO intake and participation in mobility.    Plan for today:  -increase lasix to BID per renal and I agree    AMS due to Delirium from illness/hospitalization and possible toxic metabolic encephalopathy, likely 2/2 cefepime neurotoxicity, improved  - off cefepime since 7/4 with  improvement several days later. This is likely etiology.  - Nephrology following  - CT head, ammonia, vbg WNL.    - neurology consulted 7/6, EEG without seizures   - thiamine per neuro x5 days (completed)    MRSA bacteremia due to possible left hip septic arthritis vs other source Blood cultures: 6/14, 6/15 + for MRSA (4/4), and 6/16 (1/2 GPCs) and 6/17 (2/2) GPCs. - started vancomycin 6/15.   6/17 sinovial fluid cultures with staph and klebsiella. Purulent drainage noted at incision site at TCU, CT left hip concerning for infection (no mention of joint, mostly anterior in soft tissue) and couldn't exclude abscess/inf  - Continue vancomycin, added rocephin 6/21 and changed to cefepime 6/23, ceftazidime 7/4; ID recommends 6 weeks IV vanc (last day 8/1/23) and can transition to oral cipro 750 mg BID at discharge, needs weekly vanc level, cbcd, cmp, crp while on IV abx, regular ECG monitoring of QTc on high dose cipro --> follow up outpatient with ID prior to discontinuing antibiotics (needs appt end of July)  - blood cultures negative 6/19 and 6/20 (last + was 6/17)    Acute Kidney Injury - multifactorial, see below   Nephrology consulted, appreciate recs.   - baseline creatinine 1.0 or 1.1  - etiology: suspect combo of sepsis/inflammation, hypovolemia, blood loss anemia following surgery, vancomycin  - volume (hypovolemia/congestion): euvolemic but tough to assess based on habitus  - nephrotoxins: vancomycin  - diabetes: yes; last A1C < 6, though  - sepsis: resolved now  - anemia: yes, ongoing  - cardiac performance: normal based on KEILA from 6/19  - inflammation: from surgery and infection  - creatinine downtrending; neph ordered C3 and 4, normal    Bilateral LE edema due to volume overload from HALEY and iatrogenic from blood transfusions  Hyperesthesia of bilateral lower extremities (no signs of cellulitis as of 7/13)  -Resuming diuresis 7/12  -difficult to saw why legs feel painful to even light touch    Hypoxemic  Respiratory Failure due to immobility, surgery - resolved  - continue to follow; encourage IS    Acute blood loss anemia following surgery, complicated by use of blood thinner  - 3 units day after surgery did joint washout (6/22) for acute blood loss anemia, 1 unit 6/25  - watch for bleeding; hold apixaban if need be - had unprovoked dvt in 2018 and would be on eliquis for life, high risk of recurrent DVT given recent procedures and illness/immobility  - iron and folic acid daily    S/p left hip explantation of left hip antibiotic spacer and revision of left total hip arthroplasty (5/26/2023)  - ortho performed washout 6/21  -Activity: 50% PWB LLE with posterior hip precautions [id number]  -AFO ordered for foot drop but patient refusing  -PT/OT  -Knee to be kept even in a foam leg elevator to relax sciatic nerve  -Pain: holding robaxin 4 times a day with MS changes, APAP PRN   -stop tramadol (7/11) due to renal function   -increase oxycodone 2.5-5 mg q6h PRN    Hx of unprovoked DVT in 2018   - on eliquis 5 mg bid (dose reduced to 2.5 mg BID 7/10 with impaired renal function)     Type 2 diabetes mellitus  - Last hemoglobin A1c 5.6% on 6/16/2023.  On glipizide, metformin, Actos, and Victoza prior to hospitalizations and surgery.   - continue ISS    Constipation  -Continue Colace 100 mg twice daily  -Continue senna 1 tablet twice daily  -Continue MiraLAX daily     Hypomagnesemia  -monitor and replete      Severe malnutrition in the context of acute on chronic illness  Goal for patient is to consume % of meals TID. Goal is 8426-2285 kcals/daily. Protein needs- 100-126 grams/daily.   -RD consult and appreciate recs  -regular diet  -Weekly weights, daily I/O     Adjustment disorder with depressed mood  Prolonged grief disorder  Patient has been with depressed mood in setting of ongoing health issues, as evidenced by lack of motivation to work with therapies during both TCU admissions.  Past history of passive suicidal  "statements. Psychiatry saw patient during hospital admission on 5/31 due to lack of motivation with therapies. Patient declined interest in starting any psychiatric medications. Was willing to engage with health psychology.   -Outpatient health psychology consult  -Continue to monitor for willingness to start antidepressant therapy     HTN: stopping low dose IVF (7/11), starting diuresis 7/12    Stable and Resolved Issues:  Hyperlipidemia-continue PTA Lipitor 40 mg daily  JAIR-CPAP when sleeping       Diet: Regular Diet Adult  Snacks/Supplements Adult: Other; Please send scrambled eggs with cheddar cheese,2  hurley slices and french toast for breakfast.; With Meals    DVT Prophylaxis: DOAC  Tran Catheter: Not present  Lines: PRESENT      CVC Single Lumen Right Internal jugular Non - valved (open ended);Tunneled;Power injectable-Site Assessment: WDL      Cardiac Monitoring: None  Code Status: Full Code      Clinically Significant Risk Factors            # Hypomagnesemia: Lowest Mg = 1.6 mg/dL in last 2 days, will replace as needed   # Hypoalbuminemia: Lowest albumin = 2.2 g/dL at 7/8/2023  8:51 AM, will monitor as appropriate    # Acute Kidney Injury, unspecified: based on a >150% or 0.3 mg/dL increase in last creatinine compared to past 90 day average, will monitor renal function         # Severe Obesity: Estimated body mass index is 41.81 kg/m  as calculated from the following:    Height as of this encounter: 1.778 m (5' 10\").    Weight as of this encounter: 132.2 kg (291 lb 6.4 oz).   # Severe Malnutrition: based on nutrition assessment         Disposition Plan    TBD- pending placement       Shelby Rust MD  Hospitalist Service, GOLD TEAM 21  [facility]  Securely message with [employer] (more info)  Text page via [facility] Paging/Directory   See signed in provider for up to date coverage " information  ______________________________________________________________________    Interval History   No fever/chills. Yesterday the RN notified me that he had pain on the lower legs such that even light touch (like the sheet) was bothering him and he was refusing full skin assessment. This is the case this morning but he allowed me to look at the legs. Per staff these reports are not new. No complaints today    Physical Exam   Vital Signs: Temp: 97.6  F (36.4  C) Temp src: Oral BP: (!) 159/76 Pulse: 74   Resp: 16 SpO2: 96 % O2 Device: None (Room air)    Weight: 291 lbs 6.4 oz    Constitutional: Sleepy, sitting in bed. Long pauses prior to answering questions.   Head: Normocephalic. Atraumatic.   Cardiovascular: edema bilaterally- didn't palpate due to patient preference  Respiratory: Normal respiratory rate and effort.   Musculoskeletal: Occasional jerk of shoulder or arm, appears involuntary but not persistent  Skin: slight erythema of patch on anterior left shin but not wrapping posteriorly.   Psychiatric: psychomotor slowing + flat affect      Medical Decision Making   50 MINUTES SPENT BY ME on the date of service doing chart review, history, exam, documentation & further activities per the note.      Data     I have personally reviewed the following data over the past 24 hrs:    N/A  \   N/A   / N/A     143 113 (H) 45.5 (H) /  103 (H)   3.8 19 (L) 4.59 (H) \       ALT: N/A AST: N/A AP: N/A TBILI: N/A   ALB: 2.5 (L) TOT PROTEIN: N/A LIPASE: N/A

## 2023-07-13 NOTE — PROGRESS NOTES
Nephrology Progress Note  07/12/2023         Assessment & Recommendations:   A/Rec: 71yo M with persistent prosthetic joint infection of L hip admitted for MRSA bacteremia with recurrent HALEY.     #HALEY - related to vancomycin, MRSA infection, hemodynamic instability and diuresis  After previous HALEY, creatinine had improved to 1.4 mg/dL on 6/26 but now running around 4 mg/dl  -Creatinine 4.4 mg/dL and slightly trending up despite low rate isotonic crystalloid  -No metabolic or volume indication for dialysis  Cr is stable over last 3 days  - recommend daily vanc levels  - medications adjusted for low GFR    # HTN/volume - weight up from baseline with IVF, creatinine did not improve with IVF. Discontinue IVF and restart furosemide at previous dose of 60 mg po daily.     # Low bicarb- bicarbonate currently 19 and stable without recent blood gas.  - monitor    #Anemia- related to illness.  Hemoglobin is 8.1 yesterday and stable     #Left hip prosthetic joint infection- MRSA.  Treated with vancomycin and ceftazidime.  Management per primary team and orthopedics.    Gypsy Muro MD  [facility]  Department of Medicine  Division of Nephrology and Hypertension  [facility]  [place] Web Console       Interval History :   Reviewed provider and nursing notes for past 24 hours.  Significantly slow response, single word answers. Breathing OK, no CP. Does note worsening leg edema and abdominal bloating with IVF. No N/V. Appetite stable.    Physical Exam:   I/O last 3 completed shifts:  In: -   Out: 1150 [Urine:1150]  Vitals: /43  Pulse 67  Resp 16  SpO2 99%  Temp 97.7  GENERAL APPEARANCE: awake, no distress  EYES:  no scleral icterus  PULM: Equal air movement and lungs clear bilaterally  CV: regular rhythm, normal rate.  No rub/gallop heard  Abd obese, soft, non tender  Extremities: 1-2+ LE edema  NEURO: Psychomotor slowing.  Face symmetric.  No asterixis      Labs:   All labs reviewed by  me  Electrolytes/Renal -   Recent Labs   Lab Test 07/12/23  1836 07/12/23  1432 07/12/23  1209 07/12/23  0911 07/11/23  1242 07/11/23  0656 07/10/23  1727 07/10/23  1316 07/05/23  0823 07/05/23  0700 07/02/23  1754 07/02/23  1149   NA  --   --   --  143  --  143  --  141   < > 142   < > 137   POTASSIUM  --   --   --  4.1  --  3.7  --  3.7   < > 4.5   < > 4.3   CHLORIDE  --   --   --  112*  --  112*  --  111*   < > 108*   < > 107   CO2  --   --   --  19*  --  20*  --  19*   < > 16*   < > 21*   BUN  --   --   --  43.6*  --  45.0*  --  43.0*   < > 46.3*   < > 39.1*   CR  --   --   --  4.44*  --  4.30*  --  4.00*   < > 3.30*   < > 2.20*   * 90 90 105*   < > 113*   < > 158*   < > 99   < > 128*   MAIKOL  --   --   --  8.3*  --  8.1*  --  7.7*   < > 7.7*   < > 7.9*   MAG  --   --   --  1.6*  --   --   --   --   --  1.7  --  1.7   PHOS  --   --   --  5.0*  --  5.1*  --  4.5   < >  --   --   --     < > = values in this interval not displayed.       CBC -   Recent Labs   Lab Test 07/11/23  0656 07/10/23  1316 07/09/23  1145   WBC 8.5 9.0 11.8*   HGB 8.1* 8.2* 8.2*    269 299       LFTs -   Recent Labs   Lab Test 07/12/23  0911 07/11/23  0656 07/10/23  1316 07/06/23  1708 07/06/23  0713 07/04/23  0627 06/22/23  0711 06/20/23  0704   ALKPHOS  --   --   --   --  103 99  --  93   BILITOTAL  --   --   --   --  <0.2 0.2  --  0.2   ALT  --   --   --   --  12 14  --  14   AST  --   --   --   --  9 11  --  14   PROTTOTAL  --   --   --   --  5.3* 5.9*  --  5.9*   ALBUMIN 2.7* 2.5* 2.4*   < > 2.4*  2.4* 2.6*   < > 2.3*    < > = values in this interval not displayed.       Iron Panel -   Recent Labs   Lab Test 06/26/23  0513   IRON 30*   IRONSAT 21   FADI 2,616*         Imaging:  Reviewed     Current Medications:    apixaban ANTICOAGULANT  2.5 mg Oral BID     atorvastatin  40 mg Oral Daily     cefTAZidime  2 g Intravenous Q24H     ferrous sulfate  325 mg Oral Every Other Day     folic acid  1 mg Oral Daily     furosemide  60  mg Oral Daily     heparin lock flush  5-20 mL Intracatheter Q24H     insulin aspart  1-7 Units Subcutaneous TID AC     insulin aspart  1-5 Units Subcutaneous At Bedtime     lactobacillus rhamnosus (GG)  1 capsule Oral BID     [Held by provider] methocarbamol  500 mg Oral 4x Daily     miconazole   Topical BID     multivitamin w/minerals  1 tablet Oral Daily     polyethylene glycol  17 g Oral BID     senna-docusate  2 tablet Oral BID     sodium chloride (PF)  10 mL Intracatheter Q8H     sodium chloride (PF)  10-40 mL Intracatheter Q8H     vancomycin place montilla - receiving intermittent dosing  1 each Intravenous See Admin Instructions       Gypsy Muro MD

## 2023-07-13 NOTE — PROGRESS NOTES
[facility]    Medicine Progress Note - Hospitalist Service, GOLD TEAM 16    Date of Admission:  5/26/2023    Assessment & Plan    72 year old male with history of morbid obesity, diabetes, hyperlipidemia, JAIR, PE, DVT, venous insufficiency, osteoarthritis, chronic left hip prosthetic joint infection, recent admission to [facility] on 1120 2/2022 for explantation of left hip prosthesis, debridement, reconstruction with antibiotic spacer, toxic encephalopathy, acute hypoxic respiratory failure due to obesity hypoventilation syndrome, acute blood loss anemia, HALEY, physical deconditioning is being admitted to Ortho service status post explantation of left hip antibiotic spacer, revision left total hip arthroplasty on 5/26/2023.  Medicine has been consulted for extensive medical comorbidities.     #Status post explantation of left hip antibiotic spacer, revision left hip total arthroplasty on 5/26/2023:  Management primarily per Ortho team.  - Wound cares, Dressings, Surgical pain management, DVT Prophylaxis  per primary team.   - Monitor anemia, hemodynamics, Input/Output, Capnography (for 24 hours)  - Encourage Incentive spirometry  - Laxatives for constipation prophylaxis  - Follow-up on culture data.     #Chest pain with modestly elevated troponin  - Chest pain resolved  - Case discussed with cardiology  - Recommend 2D checking echocardiogram in a.m.  - Monitor serial troponin every 6 hours    #Anemia: Most likely due to blood loss from recent surgery.  And anemia of chronic inflammation.  Hemoglobin 10.5 g/dL on 5/26/2023.  Estimated blood loss 500 mL  > Hg 9.9 gm/dl on 05/27/2023  >Hemoglobin 8.8 on 5/28/2023  Plan:  - Transfuse if Hg < 7 gm/dl       #Renal insufficiency: Creatinine level 1.4 on 5/27/2023.  Does not exactly meet the criteria for HALEY.  Creatinine level 1.19 on 5/20/2023, Creatinine level 1.09 on 05/29  Plan:  - Avoid nephrotoxins.  - Encourage PO  Is This A New Presentation, Or A Follow-Up?: Skin Lesion What Type Of Note Output Would You Prefer (Optional)?: Standard Output intake  - Renal dose medications  - Avoid hypotension, hypovolemia  - Strict I and O      #Diabetes mellitus type 2 (A1c 5.8% 3/6/23)  PTA glipizide discontinued d/t hypoglycemia risk.   >Diabetic medications (insulin aspart medium intensity correction QID, Metformin, Actos and Victoza) were held on the the morning of surgery  >Blood glucose levels reasonably controlled  Plan:  - Continue to hold oral hypoglycemic agent.  - Continue insulin aspart medium intensity correction before meals and bedtime.  - Monitor and titrate as needed.     #Hyperlipidemia  > Prior to admission on atorvastatin 40 mg daily.  > Stable  Plan:  - Continue atorvastatin     #Obstructive sleep apnea  Uses CPAP at night  Plan:  Continue CPAP  - Monitor on pulse oximetry and capnography per postop protocol     #Unprovoked DVT in L common femoral, deep and superficial femoral, popliteal, posterior tibial veins and PE (2018) on chronic anticoagulation  > Prior to admission apixaban.  > Apixaban has been held in preparation for surgery.  > Apixaban has been started  Plan:  - Continue apixaban      #Depression vs. adjustment disorder with depressed mood  Depressed mood in setting of ongoing health issues. Passive suicidal statements to nursing staff in the past. Not interested in medication therapy or psychiatry consultation.   - Health psychology was not available to follow in the rehab.  Consider reinitiating post discharge when Health Psychology is able to connect with him.     #Right elbow, shoulder numbness  Likely positional.  Strength 5 x 5.  Sensation intact.  Plan:  - Continue to monitor.    #Chest pain  - Serial troponin measurements mildly elevated  - Chest pain reportedly resolved  - Discussed case with cardiology at length  - Very much appreciate cardiology's thoughtful consultation  - Check 2D echocardiogram today  - Consider CTA chest to rule out pulmonary embolism (less likely)       Diet: Advance Diet as Tolerated: Regular Diet  "Adult  Diet    DVT Prophylaxis: DOAC  Tran Catheter: Not present  Lines: None     Cardiac Monitoring: None  Code Status: Full Code      Clinically Significant Risk Factors          # Hypocalcemia: Lowest Ca = 8.2 mg/dL in last 2 days, will monitor and replace as appropriate     # Hypoalbuminemia: Lowest albumin = 3.2 g/dL at 5/31/2023  6:30 AM, will monitor as appropriate            # Obesity: Estimated body mass index is 34.91 kg/m  as calculated from the following:    Height as of this encounter: 1.778 m (5' 10\").    Weight as of this encounter: 110.4 kg (243 lb 4.8 oz).           Disposition Plan      Expected Discharge Date: 06/02/2023,  3:00 PM    Destination: nursing home  Discharge Comments:  TCU          Jem Smith DO, MHS  Hospitalist Service, GOLD TEAM 16  [facility]  Securely message with [employer] (more info)  Text page via [facility] Paging/Directory   See signed in provider for up to date coverage information  ______________________________________________________________________    Interval History   Patient is resting comfortably upon entering room.  Patient endorses no specific symptomatology.  Per nursing staff, no acute issues or clinical concerns.  Patient is tentatively slated for discharge to [facility] tomorrow.    Physical Exam   Vital Signs: Temp: 97.8  F (36.6  C) Temp src: Oral BP: (!) 140/43 Pulse: 79   Resp: 16 SpO2: 95 % O2 Device: None (Room air)    Weight: 243 lbs 4.79 oz    GENERAL: Awake; alert; no acute distress; well-nourished.  HEENT: Normocephalic; atraumatic; PERRLA; MMM.  CV: RRR; normal S1, S2; no rubs, murmurs, or gallops.  RESP: Lung fields clear to aucultation B/L; no wheezing or crepitations.  GI: Abdomen is soft, nontender, nondistended; no organomegaly; normal bowel sounds.  : Deferred genital examination.   MSK: No clubbing, cyanosis, or edema.  DERM: Skin is intact; no rash, lesions, or skin breakdown.  NEURO: No focal " How Severe Is Your Skin Lesion?: mild Has Your Skin Lesion Been Treated?: not been treated deficits appreciated; strength & sensorium are grossly intact.  PSYCH: No active hallucinations; affect, insight appear within normal limits.    Medical Decision Making       45 MINUTES SPENT BY ME on the date of service doing chart review, history, exam, documentation & further activities per the note.      Data         Imaging results reviewed over the past 24 hrs:   No results found for this or any previous visit (from the past 24 hour(s)).

## 2023-07-13 NOTE — PHARMACY-VANCOMYCIN DOSING SERVICE
Pharmacy Vancomycin Note  Date of Service 2023  Patient's  1950   72 year old, male    Indication: Bacteremia  Day of Therapy: Started 06/15  Current vancomycin regimen:  Intermittent dosing d/t HALEY (last dose of 1500 mg on )  Current vancomycin monitoring method: Trough (Method 2 = manual dose calculation)  Current vancomycin therapeutic monitoring goal: 15-20 mg/L      Current estimated CrCl = Estimated Creatinine Clearance: 20.2 mL/min (A) (based on SCr of 4.59 mg/dL (H)).    Creatinine for last 3 days  7/10/2023:  1:16 PM Creatinine 4.00 mg/dL  2023:  6:56 AM Creatinine 4.30 mg/dL  2023:  9:11 AM Creatinine 4.44 mg/dL  2023:  8:38 AM Creatinine 4.59 mg/dL    Recent Vancomycin Levels (past 3 days)  2023:  6:56 AM Vancomycin 20.7 ug/mL  2023:  8:38 AM Vancomycin 28.2 ug/mL    Vancomycin IV Administrations (past 72 hours)                   vancomycin (VANCOCIN) 1,500 mg in 0.9% NaCl 250 mL intermittent infusion (mg) 1,500 mg New Bag 23 211                Nephrotoxins and other renal medications (From now, onward)    Start     Dose/Rate Route Frequency Ordered Stop    23 0800  furosemide (LASIX) tablet 60 mg         60 mg Oral DAILY 23 1538      23 0813  vancomycin place montilla - receiving intermittent dosing         1 each Intravenous SEE ADMIN INSTRUCTIONS 23 0813               Contrast Orders - past 72 hours (72h ago, onward)    None          Interpretation of levels and current regimen:  Vancomycin level is reflective of a concentration that does not require additional dosing at this time. Intermittent dosing d/t HALEY.    Has serum creatinine changed greater than 50% in last 72 hours: No;  serum creatinine trending upward over the past several weeks (Baseline ~ 0.9 - 1)    Urine output:  unable to determine    Renal Function: Worsening    Plan:  1. Do not give a dose today due to AM vanc level being above goal range of 15-20. Continue  monitoring and intermittent dosing d/t HALEY.  2. Vancomycin monitoring method: Trough (Method 2 = manual dose calculation)  3. Vancomycin therapeutic monitoring goal: 15-20 mg/L  4. Pharmacy will check vancomycin levels as appropriate in 1-3 Days.  5. Serum creatinine levels will be ordered a minimum of twice weekly.    Timothy Ferrari [facility]

## 2023-07-13 NOTE — CARE PLAN
"VS: BP (!) 169/70 (BP Location: Left arm)   Pulse 95   Temp 98.2  F (36.8  C) (Oral)   Resp 16   Ht 1.778 m (5' 10\")   Wt 132.2 kg (291 lb 6.4 oz)   SpO2 98%   BMI 41.81 kg/m         O2:  RA , stable, denies chest pain   Output: Voiding adequate amounts, incontinent. Purewick in place   Last BM: 7/7, incontinent.  Passing gas per patient, stool softeners administered   Activity: Assist of 3 with cares when pt allows. Refuses repositioning'.     Skin: LLE wound and surgical incision, scattered bruising. Blanchable redness to buttocks/coccyx and groin.    Pain: Pain in LLE, managed with PRN oxycodone.   CMS: Edema in BLE, complained of tingling   Dressing: CDI   Diet: Regular   LDA: CVC in R chest infusing   Equipment: IV pole, pulsate mattress, personal belongings (including wallet) in closet. Cell phone on bedside table   Plan: Continue plan of care.       "

## 2023-07-13 NOTE — PROGRESS NOTES
[facility]  Palliative Care Daily Progress Note       Recommendations & Counseling       Currently I think it would be most appropriate to include brother, Zeferino, in high stakes decision making, such as feeding tubes, dialysis, and code status, he lacked of various components (understanding and logic) of decisional capacity during my visit today    Could also consult psychiatry to weigh in on decision making capacity    Pain consult for left leg pain          Assessments          Waldo Bustos is a 72 year old male with a past medical history of T2DM, HLD, chronic back pain, JAIR, 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 11/22/22 for a scheduled explant of the left hip prosthesis, debridement, and reconstruction with antibiotics spacer. His post operative course has been complicated by deconditioning. He was discharged to TCU on 12/23/22 and was subsequently readmitted on 5/26/23 for left total hip arthroplasty and reimplantation and discharged 6/2. He most recently presented again on 6/17 with MRSA bacteremia. His present hospital course has been complicated by HALEY and declining function likely needing dialysis, encephalopathy.      Today, the patient was seen for:  Encephalopathy  HALEY  Bacteremia  Sepsis     Prognosis, Goals, or Advance Care Planning was addressed today with: Yes.    Mood, coping, and/or meaning in the context of serious illness were addressed today: Yes.  Summary/Comments: met with Rahat today, he is unable to explain his current circumstances. He says that he has never been told he needs dialysis. Discussed with him that we did discuss the possibility of it and its a day to day assessment, reviewed he doesn't need it now. Discussed his wishes, he does not want diaylsis, he said what would happen if he didn't accept it was lots of euphemisms and never said to me directly that it would  "shorten his life. He then started to ask more questions such as  \"where would I get it\" and the \"it\" being dialysis.     Revisited him about an hour later and asked him to teach back the information and he was unable to remember anything we spoke about. He acknowledges his confusion and does feel he still is having a hard time with it. I again explained his situation and he was starting to get confused and told me \"nothing has changed\". We again talked about his reason for admission and the current problems. He is not clear about whether or not he would want dialysis if it came to that. And that being said we talked about a line in the sand for him and discussed if he knew were that would be, we discussed code status, ICU level care being possible lines. Discussed that often the result of not doing these levels if needed would be shortened life. At the end he did say he wants CPR and I worry he does again not full appreciate all the information given to him and is not able to cognitively at this time process complex information.               Interval History:     Chart review/discussion with unit or clinical team members:   Notes reviewed, ongoing kidney dysfunction however not indication for dialysis yet, declines feeding tube.    Per patient or family/caregivers today:  Seen today, in bed and having a great deal of leg pain, frustrated with cares. Nursing present and providing cares as able.    Key Palliative Symptoms:  We are not helping to manage these symptoms currently in this patient.               Review of Systems:     Besides above, ROS was reviewed and is unremarkable          Medications:     I have reviewed this patient's medication profile and medications during this hospitalization.           Physical Exam:   Vitals were reviewed  Temp: 97.6  F (36.4  C) Temp src: Oral BP: (!) 159/76 Pulse: 74   Resp: 16 SpO2: 96 % O2 Device: None (Room air)      Intake/Output Summary (Last 24 hours) at 7/13/2023 " 1229  Last data filed at 7/13/2023 0608  Gross per 24 hour   Intake 250 ml   Output 450 ml   Net -200 ml                Data Reviewed:     Reviewed recent pertinent imaging, comments:   *Head CT 7/4  Impression:   1. No acute intracranial pathology.   2. Periventricular white matter hypodensities which are nonspecific,   but likely representing chronic small vessel ischemic disease.   3. Moderate cerebral atrophy.     I have personally reviewed the examination and initial interpretation   and I agree with the findings.     JOSE COMER MD     Reviewed recent labs, comments:   Sodium 143  Potassium 3.8  Creatinine 4.59  GFR 13        JOSEPH Rice CNS  [facility], Palliative Care  Securely message with the [employer] Web Console (learn more here) or  Text page via [facility] Paging/Directory     110 minutes spent chart reviewing, coordinating care with medical team, discussing goals of care with patient and family, providing education regarding medical options, and documenting.

## 2023-07-13 NOTE — PLAN OF CARE
"[id number]  BP (!) 169/70 (BP Location: Left arm)   Pulse 95   Temp 98.2  F (36.8  C) (Oral)   Resp 16   Ht 1.778 m (5' 10\")   Wt 132.2 kg (291 lb 6.4 oz)   SpO2 98%   BMI 41.81 kg/m       AOx3-4, intermittent confusion.  Word finding difficulty, slow responses at times- improving.  ORA.  Denies CP, SOB, N/V.  Reports tingling to BLE, improving per pt.     Pain: Endorsing pain to LLE managed with PRNs.  Activity: NOOB. Lift; A3 with cares.  Refusing repositioning, offered throughout shift.  UTV posterior side.  GI/: Incont B/B.  LBM 7/7 per documentation, (+)BS (-)abd pain; voiding adequately via external cath, declined to change this shift- requested to sleep.  Diet: Reg, tolerating PO intake.  Assistance with ordering meals.  BG checks: 141   LDA: R chest CVC heparin locked btwn ABX.      Plan: Pending placement, SW following.    Pt able to make needs known at time, however needs anticipated; freq rounding, call light within reach.  Contact precaut maintained.  Care ongoing.      "

## 2023-07-14 NOTE — PLAN OF CARE
Goal Outcome Evaluation:       VS: VSS   O2: SpO2 > 90% and stable on RA. LS clear and equal bilaterally. Denies chest pain and SOB.    Output: Purewick in place, adequate amounts   Last BM: 7/13   Activity: Not oob. Refused turns    Skin: L hip incision   Pain: Pain managed with ice, has oxy available    CMS: AOx4. Intermittent confusion.  Tingling and sensitive BLE. BLE Edema   Dressing: CDI   Diet: Regular diet. Denies nausea/vomiting.    BG overnight 107   LDA: CVC in R chest locked    Equipment: IV pole, personal belongings,    Plan: Contact  precautions maintained / Continue with plan of care.    Additional Info:

## 2023-07-14 NOTE — CARE PLAN
"VS: BP (!) 171/71 (BP Location: Left arm)   Pulse 75   Temp 98.1  F (36.7  C) (Oral)   Resp 16   Ht 1.778 m (5' 10\")   Wt 132.2 kg (291 lb 6.4 oz)   SpO2 97%   BMI 41.81 kg/m          O2:  RA , stable, denies chest pain   Output: Voiding adequate amounts, incontinent. Purewick in place   Last BM: 7/13/23   Activity: Assist of 3 with cares when pt allows. Refuses repositioning'.     Skin: LLE wound and surgical incision.    Pain: Pain in LLE, managed with Ice pack and PRN pain medications.   CMS: Edema in BLE, complained of tingling   Dressing: CDI   Diet: Regular   LDA: CVC in R chest infusing.   Equipment: IV pole, pulsate mattress, personal belongings (including wallet) in closet. Cell phone on bedside table.   Plan: Continue plan of care.       "

## 2023-07-14 NOTE — PROGRESS NOTES
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  Postop course complicated by profound deconditioning.  He was admitted to TCU initially on 12/23/2022 for physical rehabilitation, however therapies no longer worked with patient after several months of an in bed therapy and his refusals to mobilize.  Underwent biopsy on 4/7/2023 without evidence of infection.  He was transferred back to [facility] for left total hip arthroplasty and reimplantation on 5/26/23 with Dr. Meyers.  Presented again to [facility] for persistent MRSA bacteremia on 6/17.     Brief Hospital Summary:    Pt was admitted to continue infectious workup with MRSA bacteremia.  Admission blood cultures were positive, ID and Ortho were consulted. KEILA was obtained on 6/19, which was negative for endocarditis, ortho took for washout/debridement of hip on 6/21. ID recommending 6 weeks abx (6/21-8/1), patient recovering from HALEY, but given history of HALEY's and need for long term vanc plus comorbids making declining kidney function in the future probable nephrology doesn't feel comfortable with patient getting a PICC -- Tunneled internal jugular line placed. 7/4-7/9 course complicated by cefepime neurotoxicity limiting PO intake and participation in mobility.    Plan for today:  -No changes but he is quite slow to respond so I am nervous he is experiencing uremia from his renal disease despite it being stable  -He is okay if his brother Ivan is involved in discussing major medical decisions  because I think he lacks capacity for major life decisions at this time    AMS due to Delirium from illness/hospitalization and possible toxic metabolic encephalopathy, likely 2/2 cefepime neurotoxicity, improved  - off cefepime since 7/4 with improvement several days later. This is likely etiology.  - Nephrology following  - CT head, ammonia, vbg WNL.    - neurology consulted 7/6, EEG without seizures   - thiamine per neuro x5 days (completed)    MRSA bacteremia due to possible left hip septic arthritis vs other source Blood cultures: 6/14, 6/15 + for MRSA (4/4), and 6/16 (1/2 GPCs) and 6/17 (2/2) GPCs. - started vancomycin 6/15.   6/17 sinovial fluid cultures with staph and klebsiella. Purulent drainage noted at incision site at TCU, CT left hip concerning for infection (no mention of joint, mostly anterior in soft tissue) and couldn't exclude abscess/inf  - Continue vancomycin, added rocephin 6/21 and changed to cefepime 6/23, ceftazidime 7/4; ID recommends 6 weeks IV vanc (last day 8/1/23) and can transition to oral cipro 750 mg BID at discharge, needs weekly vanc level, cbcd, cmp, crp while on IV abx, regular ECG monitoring of QTc on high dose cipro --> follow up outpatient with ID prior to discontinuing antibiotics (needs appt end of July)  - blood cultures negative 6/19 and 6/20 (last + was 6/17)    Acute Kidney Injury - multifactorial, see below   Nephrology consulted, appreciate recs.   - baseline creatinine 1.0 or 1.1  - etiology: suspect combo of sepsis/inflammation, hypovolemia, blood loss anemia following surgery, vancomycin  - volume (hypovolemia/congestion): suspect hypervolemic based on lower extremities, HTN  - nephrotoxins: vancomycin  - diabetes: yes; last A1C < 6, though  - sepsis: resolved now  - anemia: yes, ongoing  - cardiac performance: normal based on KEILA from 6/19  - inflammation: from surgery and infection  - creatinine downtrending; neph ordered C3 and 4, normal    Bilateral LE edema due  to volume overload from HALEY and iatrogenic from blood transfusions  Hyperesthesia of bilateral lower extremities (no signs of cellulitis as of 7/13)  -Resuming diuresis 7/12  -difficult to saw why legs feel painful to even light touch    Hypoxemic Respiratory Failure due to immobility, surgery - resolved  - continue to follow; encourage IS    Acute blood loss anemia following surgery, complicated by use of blood thinner  - 3 units day after surgery did joint washout (6/22) for acute blood loss anemia, 1 unit 6/25  - watch for bleeding; hold apixaban if need be - had unprovoked dvt in 2018 and would be on eliquis for life, high risk of recurrent DVT given recent procedures and illness/immobility  - iron and folic acid daily    S/p left hip explantation of left hip antibiotic spacer and revision of left total hip arthroplasty (5/26/2023)  - ortho performed washout 6/21  -Activity: 50% PWB LLE with posterior hip precautions [id number]  -AFO ordered for foot drop but patient refusing  -PT/OT  -Knee to be kept even in a foam leg elevator to relax sciatic nerve  -Pain: holding robaxin 4 times a day with MS changes, APAP PRN   -stop tramadol (7/11) due to renal function   -increase oxycodone 2.5-5 mg q6h PRN    Hx of unprovoked DVT in 2018   - on eliquis 5 mg bid (dose reduced to 2.5 mg BID 7/10 with impaired renal function)     Type 2 diabetes mellitus  - Last hemoglobin A1c 5.6% on 6/16/2023.  On glipizide, metformin, Actos, and Victoza prior to hospitalizations and surgery.   - continue ISS    Constipation  -Continue Colace 100 mg twice daily  -Continue senna 1 tablet twice daily  -Continue MiraLAX daily     Hypomagnesemia  -monitor and replete      Severe malnutrition in the context of acute on chronic illness  Goal for patient is to consume % of meals TID. Goal is 0572-8128 kcals/daily. Protein needs- 100-126 grams/daily.   -RD consult and appreciate recs  -regular diet  -Weekly weights, daily I/O     Adjustment  "disorder with depressed mood  Prolonged grief disorder  Patient has been with depressed mood in setting of ongoing health issues, as evidenced by lack of motivation to work with therapies during both TCU admissions.  Past history of passive suicidal statements. Psychiatry saw patient during hospital admission on 5/31 due to lack of motivation with therapies. Patient declined interest in starting any psychiatric medications. Was willing to engage with health psychology.   -Outpatient health psychology consult  -Continue to monitor for willingness to start antidepressant therapy     HTN: stopping low dose IVF (7/11), starting diuresis 7/12, increased diuresis 7/13    Stable and Resolved Issues:  Hyperlipidemia-continue PTA Lipitor 40 mg daily  JAIR-CPAP when sleeping     Diet: Regular Diet Adult  Snacks/Supplements Adult: Other; Please send scrambled eggs with cheddar cheese,2  hurley slices and french toast for breakfast.; With Meals    DVT Prophylaxis: DOAC  Tran Catheter: Not present  Lines: PRESENT      CVC Single Lumen Right Internal jugular Non - valved (open ended);Tunneled;Power injectable-Site Assessment: WDL      Cardiac Monitoring: None  Code Status: Full Code      Clinically Significant Risk Factors            # Hypomagnesemia: Lowest Mg = 1.6 mg/dL in last 2 days, will replace as needed   # Hypoalbuminemia: Lowest albumin = 2.2 g/dL at 7/8/2023  8:51 AM, will monitor as appropriate    # Acute Kidney Injury, unspecified: based on a >150% or 0.3 mg/dL increase in last creatinine compared to past 90 day average, will monitor renal function         # Severe Obesity: Estimated body mass index is 41.81 kg/m  as calculated from the following:    Height as of this encounter: 1.778 m (5' 10\").    Weight as of this encounter: 132.2 kg (291 lb 6.4 oz).   # Severe Malnutrition: based on nutrition assessment         Disposition Plan    TBD pending placement       Shelby Rust MD  Hospitalist Service, GOLD TEAM " "21  [facility]  Securely message with [employer] (more info)  Text page via [employer] Paging/Directory   See signed in provider for up to date coverage information  ______________________________________________________________________    Interval History   No events overnight. He says he feels a bit confused. He is very slow to respond. No fever/chills. No breathing problems, abdominal pain. I say I'm just checking in, just checking you out. He says \"I'm checking you out\" and winks at me. For other questions he stares off into space when he answers and seems to lose train of thought.     Physical Exam   Vital Signs: Temp: 97.6  F (36.4  C) Temp src: Oral BP: (!) 161/68 Pulse: 76   Resp: 16 SpO2: 95 % O2 Device: None (Room air)    Weight: 291 lbs 6.4 oz  Constitutional: Sleeping but wakes to voice and then eventually falls back to sleep  Head: Normocephalic. Atraumatic.   Cardiovascular: Regular rate and rhythm. No murmurs, clicks, gallops or rubs.  edema in lower legs  Respiratory: Clear to auscultation without wheezes or crackles. Normal respiratory rate and effort.   Gastrointestinal: Abdomen soft, non-tender. BS normal. No masses, organomegaly  Musculoskeletal: No asterixis at the wrists but has a hard time holding his arms up due to poor strength  Skin: bilat significant edema but requests provider doesn't touch his legs  Neuro: slow to respond, starts to answer and then trails off. Also falls asleep fairly easily      Medical Decision Making   50 MINUTES SPENT BY ME on the date of service doing chart review, history, exam, documentation & further activities per the note.      Data     I have personally reviewed the following data over the past 24 hrs:    N/A  \   N/A   / N/A     144 112 (H) 45.4 (H) /  169 (H)   3.7 20 (L) 4.66 (H) \       ALT: N/A AST: N/A AP: N/A TBILI: N/A   ALB: 2.6 (L) TOT PROTEIN: N/A LIPASE: N/A       "

## 2023-07-14 NOTE — PROGRESS NOTES
"  VS: BP (!) 161/68 (BP Location: Left arm)   Pulse 76   Temp 97.6  F (36.4  C) (Oral)   Resp 16   Ht 1.778 m (5' 10\")   Wt 132.2 kg (291 lb 6.4 oz)   SpO2 95%   BMI 41.81 kg/m     O2: Room air saturations 95%.    Output: Pt is incontinent of both bowel and bladder.    Last BM:    Activity: Pt very reluctant to move side to side. Seems disinterested in activities (eating, watching tv, chatting with staff,\" Pt falls asleep mid sentence when trying to assess patient.    Skin: Pt has some scrotal redness that topical powder was applied.    Pain: Pt was medicated with Tylenol for pain this am. Pt complains of back pain at baseline   CMS: Has bilateral edema in both lower legs.    Dressing: Right chest IV dressing was changed . Blood returns were checked and IV site right chest was hep locked with 5 ml of heparin.    Diet: Regular. Pt very inconsistent. Orders x 2 trays and did not eat any of it. Blood sugars were checked.    LDA: IV Hep locked.    Equipment: Specialty bed, Isolation cart for isolation, chux and breifs for bowel and bladder incontinence. , blood sugar checks   Plan: Awaiting bed placement   Additional Info: Pt at times seems slow to respond in conversation with staff. A lot of shift patient sleeps. Status of patient will continue to be monitored.        "

## 2023-07-14 NOTE — PLAN OF CARE
"[id number]    VS: Temp: 98.1  F (36.7  C) Temp src: Axillary BP: (!) 165/74 Pulse: 76   Resp: 16 SpO2: 96 % O2 Device: None (Room air)       O2: 96% on room air, denies SOB and CP, no cough present. Lung sounds diminished bilaterally.   Output: Incontinent of bowel & bladder. New primofit placed this shift.   Last BM: 07/14/2023   Activity: Assist x3 for cares, pt refuses to get oob, reposition, turn or participate in cares. Pt encouraged and educated on the importance of repositioning and participating in cares, pt refused.   Skin: LLE surgical incision, blanchable redness to caridad area. DEEP backside of pt - pt refused.   Pain: Managed with tylenol and oxycodone this shift.   Neuro: A & O x2, pt is intermittently disoriented to situation and time. Intermittently confused. Reports numbness and tingling to all extremities.   Dressing: CDI   Diet: Regular, pt refusing oral intake Pt did not eat any of his dinner. Oral intake encouraged, pt refused. BG checks. No novolog given this shift.   LDA: R) CVC, primofit external catheter   Equipment: Personal belongings, call light, bedpan, IV pump/pole, pulsate mattress.   Plan: Continue with plan of care, waiting on placement/funding   Additional Info: Writer and support staff entered pt's room around 1715 to place new primofit catheter, pt had a soiled brief on. Pt refused to let staff remove and change his brief. Pt agreeable to placing primofit catheter and stated \"ok but I'm not going to move\". New primofit placed. Asked pt again if staff could remove soiled brief and educated on importance on preventing skin breakdown. Pt refused. New chux pad placed on top of soiled brief and tucked as much as possible to prevent moisture and provide a barrier between the pt and the soiled brief.    Pt is very uncooperative and refuses many cares. Pt is very slow to respond and has word finding difficulty. Pt slept most of the shift.             "

## 2023-07-14 NOTE — PROGRESS NOTES
"Orthopedic Surgery Progress Note: 07/14/2023    Subjective:   NAEON. Reviewed RN and consultant notes. Confusion continues; palliative saw patient and recommending brother be involved for decision making. Creatinine still high but not indicated for dialysis. WBC wnl. CRP in 30s-40s. Incontinent. Continues on ceftaz, vanc  Eliquis for VTE    Objective:   BP (!) 171/71 (BP Location: Left arm)   Pulse 75   Temp 98.1  F (36.7  C) (Oral)   Resp 16   Ht 1.778 m (5' 10\")   Wt 132.2 kg (291 lb 6.4 oz)   SpO2 97%   BMI 41.81 kg/m    No intake/output data recorded.  General: NAD. Resting comfortably in bed.  Respiratory: Breathing comfortably on RA.  Musculoskeletal: Focused exam of the left lower extremity: Dressings c/d/i.  Fires GSC, TA, FHL, and EHL minimally. Incredibly sensitive to touch on feet. Overall decreased sensation distally. Brisk capillary refill.     Laboratory Data:  Lab Results   Component Value Date    WBC 8.5 07/11/2023    HGB 8.1 (L) 07/11/2023     07/11/2023      Intraoperative cultures   6/21/2023: MRSA, C Acnes, Pseudomonas    Assessment & Plan:   Waldo Bustos is a 72 year old male status post left revision total hip arthroplasty on 5/26/2023 with Dr. Meyers now presenting with left hip prosthetic joint infection status post left hip irrigation and debridement on 6/21/2023 with Dr. Meyers.    Postoperatively noted to have confusion and increasing creatinine. Thorough workup by primary, Neuro, renal suggests metabolic encephalopathy. No indication for dialysis, per renal.    ABLA. Intermittent transfusions while hospitalized. Good response.    CRP downtrending. C acnes, MRSA in cultures. Treating with vanc/cefepime, per ID recs. Appreciate assistance.    Ortho will continue to follow every few days. Please do not hesitate to page with questions/concerns.    Goals for Today:  - Pain control  - Mobilization - appreciate therapy recs    Ortho Plan:  - Activity: Up with assist until " independent. Posterior hip precautions x 3 months (no flexion beyond 90 degrees, no adduction beyond midline, and no internal rotation beyond midline).  - Weightbearing Status: WBAT LLE.  - Pain Management: Transition from IV to PO as tolerated.  - Antibiotics: Vancomycin and ceftazidime, per ID/primary.  - Diet: OK for a diet from an orthopedic perspective.  - DVT Prophylaxis: SCDs and PTA apixaban okay from an ortho perspective  - Imaging: None pending.  - Labs: Labs PRN and per primary  - Bracing/Splinting: Abduction pillow while in bed.  - Dressings: Dressing changed 7/3.  - Drains: GUERRERO drain removed 7/3  - Tran Catheter: None. Defer to primary but consider  - Physical Therapy/Occupational Therapy: Evaluation and treatment.  - Cultures: Intraoperative cultures 6/21/2023.  - Follow-up: Clinic with Dr. Meyers's team TBD (pending dispo plan). Will plan to get XR at that time.    - Disposition: TCU. Okay to discharge from an ortho perspective.     Hannah Starr MD, PGY-4  Orthopedics  Pager: [phone]

## 2023-07-14 NOTE — PROGRESS NOTES
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     #HALEY - related to vancomycin, MRSA infection, hemodynamic instability and diuresis  After previous HALEY, creatinine had improved to 1.4 mg/dL on 6/26 but now running around 4 mg/dl  Creatinine stable at 4.6 mg/dL with BUN 45.  -No metabolic or volume indication for dialysis  - vanco monitoring per pharmacy  - medications adjusted for low GFR    # HTN/volume - weight up from baseline with IVF  - IVF stopped 7/12/13  - continue furosemide to 60 mg po bid     # Low bicarb- bicarbonate currently 19 and stable without recent blood gas.  - monitor    #Anemia- related to illness.  Hemoglobin is 8.1 on 7/11/23 and stable     #Left hip prosthetic joint infection- MRSA.  Treated with vancomycin and ceftazidime.  Management per primary team and orthopedics.    Gypsy Muro MD  [facility]  Department of Medicine  Division of Nephrology and Hypertension  [facility]  Vocera Web Console       Interval History :   Reviewed provider and nursing notes for past 24 hours.  Significantly slow response, single word answers. Yesterday increased furosemide to 60 mg bid. Still having heaviness and discomfort in legs. Will on allow examination of left leg due to pain. Reports that bowels are active. Not sure if he has had nausea or vomiting. No fever or chills. Cannot sit up in bed due to pain and is reclined and eating at time of my visit.    Physical Exam:   I/O last 3 completed shifts:  In: 450 [P.O.:450]  Out: 1100 [Urine:1100]  Vitals: /43  Pulse 67  Resp 16  SpO2 99%  Temp 97.7  GENERAL APPEARANCE: awake, no distress  EYES:  no scleral icterus  PULM: Equal air movement and lungs clear bilaterally  CV: regular rhythm, normal rate.  No rub/gallop heard  Abd obese, soft, non tender  Extremities: 1-2+ LE edema of  arms and legs  NEURO: Psychomotor slowing.  Face symmetric.     Labs:   All labs reviewed by me  Electrolytes/Renal -   Recent Labs   Lab Test 07/14/23  1407 07/14/23  0952 07/14/23  0739 07/13/23  1059 07/13/23  0838 07/12/23  1209 07/12/23  0911 07/05/23  0823 07/05/23  0700 07/02/23  1754 07/02/23  1149   NA  --  144  --   --  143  --  143   < > 142   < > 137   POTASSIUM  --  3.7  --   --  3.8  --  4.1   < > 4.5   < > 4.3   CHLORIDE  --  112*  --   --  113*  --  112*   < > 108*   < > 107   CO2  --  20*  --   --  19*  --  19*   < > 16*   < > 21*   BUN  --  45.4*  --   --  45.5*  --  43.6*   < > 46.3*   < > 39.1*   CR  --  4.66*  --   --  4.59*  --  4.44*   < > 3.30*   < > 2.20*   * 109* 96   < > 100*   < > 105*   < > 99   < > 128*   MAIKOL  --  8.2*  --   --  8.1*  --  8.3*   < > 7.7*   < > 7.9*   MAG  --   --   --   --   --   --  1.6*  --  1.7  --  1.7   PHOS  --  4.5  --   --  4.7*  --  5.0*   < >  --   --   --     < > = values in this interval not displayed.       CBC -   Recent Labs   Lab Test 07/11/23  0656 07/10/23  1316 07/09/23  1145   WBC 8.5 9.0 11.8*   HGB 8.1* 8.2* 8.2*    269 299       LFTs -   Recent Labs   Lab Test 07/14/23  0952 07/13/23  0838 07/12/23  0911 07/06/23  1708 07/06/23  0713 07/04/23  0627 06/22/23  0711 06/20/23  0704   ALKPHOS  --   --   --   --  103 99  --  93   BILITOTAL  --   --   --   --  <0.2 0.2  --  0.2   ALT  --   --   --   --  12 14  --  14   AST  --   --   --   --  9 11  --  14   PROTTOTAL  --   --   --   --  5.3* 5.9*  --  5.9*   ALBUMIN 2.6* 2.5* 2.7*   < > 2.4*  2.4* 2.6*   < > 2.3*    < > = values in this interval not displayed.       Iron Panel -   Recent Labs   Lab Test 06/26/23  0513   IRON 30*   IRONSAT 21   FADI 2,616*         Imaging:  Reviewed     Current Medications:    apixaban ANTICOAGULANT  2.5 mg Oral BID     atorvastatin  40 mg Oral Daily     cefTAZidime  2 g Intravenous Q24H     ferrous sulfate  325 mg Oral Every Other Day     folic acid  1 mg  Oral Daily     furosemide  60 mg Oral BID     heparin lock flush  5-20 mL Intracatheter Q24H     insulin aspart  1-7 Units Subcutaneous TID AC     insulin aspart  1-5 Units Subcutaneous At Bedtime     lactobacillus rhamnosus (GG)  1 capsule Oral BID     [Held by provider] methocarbamol  500 mg Oral 4x Daily     miconazole   Topical BID     multivitamin w/minerals  1 tablet Oral Daily     polyethylene glycol  17 g Oral BID     senna-docusate  2 tablet Oral BID     sodium chloride (PF)  10 mL Intracatheter Q8H     sodium chloride (PF)  10-40 mL Intracatheter Q8H     vancomycin place montilla - receiving intermittent dosing  1 each Intravenous See Admin Instructions       Gypsy Muro MD

## 2023-07-15 NOTE — PLAN OF CARE
VS: Patient refused vital signs assessment this shift.    O2: Stable at room air. Denies SOB and chest pain.   Lung sounds diminished.    Output: External catheter- primofit placed on previous shift 07/14/23    Last BM: Incontinent. 7/15/2023. Smear/small soft BM.   Activity: Not oob this shift. Assist of 3 with cares and repositioning.   Patient turns side to side during cares with assistance. New sheet, chucks and brief in place at 0600am.    Skin: LLE surgical incision. Blanchable redness to caridad area and right inguinal area. Barrier cream applied.    Pain: Denies pain.   CMS: Disoriented to time. Edema on both legs.   Dressing: CDI   Diet: Regular diet. Poor appetite. Snack offered at night time but refused. BG at 0200am - 112.   LDA: R CVC. Has every 24hr heparin flush order in place.  External catheter- primofit   Equipment: IV pole/pump, bedpan, call light, and personal belonigngs   Plan: Continue with plan of care. Waiting for placement.    Additional Info: Patient is slow to respond and often has word finding difficulty.  Allow some time to respond and patient will agree with cares.

## 2023-07-15 NOTE — PROGRESS NOTES
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     #HALEY - related to vancomycin, MRSA infection, hemodynamic instability and diuresis  After previous HALEY, creatinine had improved to 1.4 mg/dL on 6/26 but now running around 4 mg/dl  Creatinine stable at 4.8 mg/dL with BUN 46  -No metabolic or volume indication for dialysis  - vanco monitoring per pharmacy  - medications adjusted for low GFR    # HTN/volume - weight up from baseline with IVF  - IVF stopped 7/12/13  - continue furosemide to 60 mg po bid     # Low bicarb- bicarbonate currently 19 and stable without recent blood gas.  - monitor    #Anemia- related to illness.  Hemoglobin is 8.1 on 7/11/23 and stable     #Left hip prosthetic joint infection- MRSA.  Treated with vancomycin and ceftazidime.  Management per primary team and orthopedics.    Gypsy Muro MD  [facility]  Department of Medicine  Division of Nephrology and Hypertension  [facility]  Vocera Web Console       Interval History :   Reviewed provider and nursing notes for past 24 hours.  Significantly slow response, single word answers.  Remains on furosemide 60 mg p.o. twice daily.  Had urine output of at least 2 L yesterday and 1 L overnight.  He is not sure if his edema is improved.  When asked how he is doing he also does not seem sure.  His legs still hurt.  His abdomen still feels bloated.  He has a headache and seems reluctant to answer my questions.    Physical Exam:   I/O last 3 completed shifts:  In: 240 [P.O.:240]  Out: 1910 [Urine:1910]  Vitals: /43  Pulse 67  Resp 16  SpO2 99%  Temp 97.7  GENERAL APPEARANCE: awake, no distress  EYES:  no scleral icterus  PULM: Equal air movement and lungs clear bilaterally  CV: regular rhythm, normal rate.  No rub/gallop heard  Abd obese, soft, non tender  Extremities: 1-2+ LE  edema of arms and legs  NEURO: Psychomotor slowing.  Face symmetric.     Labs:   All labs reviewed by me  Electrolytes/Renal -   Recent Labs   Lab Test 07/15/23  1150 07/15/23  0752 07/15/23  0727 07/14/23  1407 07/14/23  0952 07/13/23  1059 07/13/23  0838 07/12/23  1209 07/12/23  0911 07/05/23  0823 07/05/23  0700 07/02/23  1754 07/02/23  1149   NA  --   --  145  --  144  --  143  --  143   < > 142   < > 137   POTASSIUM  --   --  3.6  --  3.7  --  3.8  --  4.1   < > 4.5   < > 4.3   CHLORIDE  --   --  112*  --  112*  --  113*  --  112*   < > 108*   < > 107   CO2  --   --  20*  --  20*  --  19*  --  19*   < > 16*   < > 21*   BUN  --   --  46.4*  --  45.4*  --  45.5*  --  43.6*   < > 46.3*   < > 39.1*   CR  --   --  4.80*  --  4.66*  --  4.59*  --  4.44*   < > 3.30*   < > 2.20*   * 103* 112*   < > 109*   < > 100*   < > 105*   < > 99   < > 128*   MAIKOL  --   --  8.3*  --  8.2*  --  8.1*  --  8.3*   < > 7.7*   < > 7.9*   MAG  --   --   --   --   --   --   --   --  1.6*  --  1.7  --  1.7   PHOS  --   --  4.7*  --  4.5  --  4.7*  --  5.0*   < >  --   --   --     < > = values in this interval not displayed.       CBC -   Recent Labs   Lab Test 07/11/23  0656 07/10/23  1316 07/09/23  1145   WBC 8.5 9.0 11.8*   HGB 8.1* 8.2* 8.2*    269 299       LFTs -   Recent Labs   Lab Test 07/15/23  0727 07/14/23  0952 07/13/23  0838 07/06/23  1708 07/06/23  0713 07/04/23  0627 06/22/23  0711 06/20/23  0704   ALKPHOS  --   --   --   --  103 99  --  93   BILITOTAL  --   --   --   --  <0.2 0.2  --  0.2   ALT  --   --   --   --  12 14  --  14   AST  --   --   --   --  9 11  --  14   PROTTOTAL  --   --   --   --  5.3* 5.9*  --  5.9*   ALBUMIN 2.7* 2.6* 2.5*   < > 2.4*  2.4* 2.6*   < > 2.3*    < > = values in this interval not displayed.       Iron Panel -   Recent Labs   Lab Test 06/26/23  0513   IRON 30*   IRONSAT 21   FADI 2,616*         Imaging:  Reviewed     Current Medications:    apixaban ANTICOAGULANT  2.5 mg Oral BID      atorvastatin  40 mg Oral Daily     cefTAZidime  2 g Intravenous Q24H     ferrous sulfate  325 mg Oral Every Other Day     folic acid  1 mg Oral Daily     furosemide  60 mg Oral BID     heparin lock flush  5-20 mL Intracatheter Q24H     insulin aspart  1-7 Units Subcutaneous TID AC     insulin aspart  1-5 Units Subcutaneous At Bedtime     lactobacillus rhamnosus (GG)  1 capsule Oral BID     [Held by provider] methocarbamol  500 mg Oral 4x Daily     miconazole   Topical BID     multivitamin w/minerals  1 tablet Oral Daily     polyethylene glycol  17 g Oral BID     senna-docusate  1 tablet Oral BID     sodium chloride (PF)  10 mL Intracatheter Q8H     sodium chloride (PF)  10-40 mL Intracatheter Q8H     vancomycin place montilla - receiving intermittent dosing  1 each Intravenous See Admin Instructions       Gypsy Muro MD

## 2023-07-15 NOTE — PROGRESS NOTES
"  VS: BP (!) 159/69 (BP Location: Left arm)   Pulse 75   Temp 97.5  F (36.4  C) (Oral)   Resp 16   Ht 1.778 m (5' 10\")   Wt 132.2 kg (291 lb 6.4 oz)   SpO2 96%   BMI 41.81 kg/m     O2: Room air saturations 96%. Pt declines to use IS   Output: Pt has  The male primamore in place for urine output. Seems to be working better today. With a brief over it.    Last BM: Pt allowed staff to change brief at 0600 this am. Brief still in place.    Activity: Pt got up into Anastasiia chair with NST help. Pt really seems to have a lot of trust and confidence with Tiffani the NST. He does a nice job with reassuring patient   Skin: Redness in patients groin. Medicated powder applied after hygiene cares.    Pain: Pt states\" I hurt everywhere.\" However pt falls right back asleep during assessment. Pt continues to complain more specifiacally of pain in left lower leg then right. Reassure patient when moving him to and from into chair. Pt very reluctant to try to move side to side. Nees a lot of coaching pre any movement of patient. Will continue to try and increase activity. Made a plan with patient to try to move at 10 am today and 2 pm.    CMS: Has swelling in both lower legs. Taking Lasix for this.    Dressing:    Diet: Regular. So far patient has only drank water. Seems to have no appetite at all.    LDA: CVC line in right chest dressing is CDI. Had good blood returns yesterday with Hep flush   Equipment: Ceiling lift, bariatric bed, blood sugar checks, Heavy assist with ADLs   Plan: Awaiting for funding for placement   Additional Info: Pt very reluctant with increasing activity. Was coached to sit up in Anastasiia/ recliner chair this shift. Use  The ceiling lift with heavy assist of staff to move into chair. Pt cognitively seems very slow to respond to direct questions. He is able to respond but seems to respond so slowly. Needs a lot of calmness with trying to assess. Status of patient continue to be monitored.        "

## 2023-07-15 NOTE — PROGRESS NOTES
"[facility]    Medicine Progress Note - Hospitalist Service, GOLD TEAM 21    Date of Admission:  6/17/2023    Assessment & Plan   Waldo Bustos is a 72 year old male with a history of type 2 diabetes, hyperlipidemia, chronic back pain, JAIR, DVT/PE on DOAC, and chronic left hip prosthetic joint infection initially admitted to [facility] on 11/22/2022 for scheduled explant of left hip prosthesis, debridement, and reconstruction with antibiotic spacer.  Postop course complicated by profound deconditioning.  He was admitted to TCU initially on 12/23/2022 for physical rehabilitation, however therapies no longer worked with patient after several months of an in bed therapy and his refusals to mobilize.  Underwent biopsy on 4/7/2023 without evidence of infection.  He was transferred back to [facility] for left total hip arthroplasty and reimplantation on 5/26/23 with Dr. Meyers.  Presented again to [place] for persistent MRSA bacteremia on 6/17.     Brief Hospital Summary:    Pt was admitted to continue infectious workup with MRSA bacteremia.  Admission blood cultures were positive, ID and Ortho were consulted. KEILA was obtained on 6/19, which was negative for endocarditis, ortho took for washout/debridement of hip on 6/21. ID recommending 6 weeks abx (6/21-8/1), patient recovering from HALEY, but given history of HALEY's and need for long term vanc plus comorbids making declining kidney function in the future probable nephrology doesn't feel comfortable with patient getting a PICC -- Tunneled internal jugular line placed. 7/4-7/9 course complicated by cefepime neurotoxicity limiting PO intake and participation in mobility.    Plan for today:  -was up to chair today, got \"cleaned up\" and that felt good    AMS due to Delirium from illness/hospitalization and possible toxic metabolic encephalopathy, likely 2/2 cefepime neurotoxicity, improved  - off cefepime since " 7/4 with improvement several days later. This is likely etiology.  - Nephrology following  - CT head, ammonia, vbg WNL.    - neurology consulted 7/6, EEG without seizures   - thiamine per neuro x5 days (completed)    MRSA bacteremia due to possible left hip septic arthritis vs other source Blood cultures: 6/14, 6/15 + for MRSA (4/4), and 6/16 (1/2 GPCs) and 6/17 (2/2) GPCs. - started vancomycin 6/15.   6/17 sinovial fluid cultures with staph and klebsiella. Purulent drainage noted at incision site at TCU, CT left hip concerning for infection (no mention of joint, mostly anterior in soft tissue) and couldn't exclude abscess/inf  - Continue vancomycin, added rocephin 6/21 and changed to cefepime 6/23, ceftazidime 7/4; ID recommends 6 weeks IV vanc (last day 8/1/23) and can transition to oral cipro 750 mg BID at discharge, needs weekly vanc level, cbcd, cmp, crp while on IV abx, regular ECG monitoring of QTc on high dose cipro --> follow up outpatient with ID prior to discontinuing antibiotics (needs appt end of July)  - blood cultures negative 6/19 and 6/20 (last + was 6/17)    Acute Kidney Injury - multifactorial, see below   Nephrology consulted, appreciate recs.   - baseline creatinine 1.0 or 1.1  - etiology: suspect combo of sepsis/inflammation, hypovolemia, blood loss anemia following surgery, vancomycin  - volume (hypovolemia/congestion): suspect hypervolemic based on lower extremities, HTN  - nephrotoxins: vancomycin  - diabetes: yes; last A1C < 6, though  - sepsis: resolved now  - anemia: yes, ongoing  - cardiac performance: normal based on KEILA from 6/19  - inflammation: from surgery and infection  - creatinine downtrending; neph ordered C3 and 4, normal  Overall kidney function is gradually worse but very slow downtrend-- tried to broach whether or not he would even want dialysis but not sure he is fully understanding conversation and if he is, he is not able to express his wishes or he is avoiding expressing  his wishes    Bilateral LE edema due to volume overload from HALEY and iatrogenic from blood transfusions  Hyperesthesia of bilateral lower extremities (no signs of cellulitis as of 7/13)  -Resuming diuresis 7/12  -difficult to saw why legs feel painful to even light touch    Hypoxemic Respiratory Failure due to immobility, surgery - resolved  - continue to follow; encourage IS    Acute blood loss anemia following surgery, complicated by use of blood thinner  - 3 units day after surgery did joint washout (6/22) for acute blood loss anemia, 1 unit 6/25  - watch for bleeding; hold apixaban if need be - had unprovoked dvt in 2018 and would be on eliquis for life, high risk of recurrent DVT given recent procedures and illness/immobility  - iron and folic acid daily    S/p left hip explantation of left hip antibiotic spacer and revision of left total hip arthroplasty (5/26/2023)  - ortho performed washout 6/21  -Activity: 50% PWB LLE with posterior hip precautions [id number]  -AFO ordered for foot drop but patient refusing  -PT/OT  -Knee to be kept even in a foam leg elevator to relax sciatic nerve  -Pain: holding robaxin 4 times a day with MS changes, APAP PRN   -stop tramadol (7/11) due to renal function   -increase oxycodone 2.5-5 mg q6h PRN- taking very minimal amts    Hx of unprovoked DVT in 2018   - on eliquis 5 mg bid (dose reduced to 2.5 mg BID 7/10 with impaired renal function)     Type 2 diabetes mellitus  - Last hemoglobin A1c 5.6% on 6/16/2023.  On glipizide, metformin, Actos, and Victoza prior to hospitalizations and surgery.   - continue ISS    Constipation, improved  -Continue Colace 100 mg twice daily  -Continue senna 1 tablet twice daily-- had briefly increased to 2 tab twice daily due to lack of stool, now improved  -Continue MiraLAX daily     Hypomagnesemia  -monitor and replete      Severe malnutrition in the context of acute on chronic illness  Goal for patient is to consume % of meals TID. Goal is  1052-7039 kcals/daily. Protein needs- 100-126 grams/daily.   -RD consult and appreciate recs  -regular diet  -Weekly weights, daily I/O     Adjustment disorder with depressed mood  Prolonged grief disorder  Patient has been with depressed mood in setting of ongoing health issues, as evidenced by lack of motivation to work with therapies during both TCU admissions.  Past history of passive suicidal statements. Psychiatry saw patient during hospital admission on 5/31 due to lack of motivation with therapies. Patient declined interest in starting any psychiatric medications. Was willing to engage with health psychology.   -Outpatient health psychology consult  -Continue to monitor for willingness to start antidepressant therapy  -tried to discuss mood 7/15 but he didn't fully answer questions, seemed to talk about different aspect of his health that wasn't quite related to mood     HTN: stopping low dose IVF (7/11), starting diuresis 7/12, increased diuresis 7/13    Stable and Resolved Issues:  Hyperlipidemia-continue PTA Lipitor 40 mg daily  JAIR-CPAP when sleeping       Diet: Regular Diet Adult  Snacks/Supplements Adult: Other; Please send scrambled eggs with cheddar cheese,2  hurley slices and Macedonian toast for breakfast.; With Meals    DVT Prophylaxis: DOAC  Tran Catheter: Not present  Lines: PRESENT      CVC Single Lumen Right Internal jugular Non - valved (open ended);Tunneled;Power injectable-Site Assessment: WDL      Cardiac Monitoring: None  Code Status: Full Code      Clinically Significant Risk Factors              # Hypoalbuminemia: Lowest albumin = 2.2 g/dL at 7/8/2023  8:51 AM, will monitor as appropriate    # Acute Kidney Injury, unspecified: based on a >150% or 0.3 mg/dL increase in last creatinine compared to past 90 day average, will monitor renal function         # Severe Obesity: Estimated body mass index is 41.81 kg/m  as calculated from the following:    Height as of this encounter: 1.778 m (5'  "10\").    Weight as of this encounter: 132.2 kg (291 lb 6.4 oz).   # Severe Malnutrition: based on nutrition assessment         Disposition Plan    TBD- pending clinical improvement/placement       Shelby Rust MD  Hospitalist Service, GOLD TEAM 21  [facility]  Securely message with [employer] (more info)  Text page via [employer] Paging/Directory   See signed in provider for up to date coverage information  ______________________________________________________________________    Interval History   No events overnight. Was up to chair today and says he ate a good breakfast. Says \"they moved me back up here after that\" and when I clarified he said it felt like they brought him to a different room but he seemed to know they didn't. When asked about his mood he started talking about how his neck and back are \"cracking\" all the time like they used to.     Physical Exam   Vital Signs: Temp: 97.5  F (36.4  C) Temp src: Oral BP: (!) 159/69 Pulse: 75   Resp: 16 SpO2: 96 % O2 Device: None (Room air)    Weight: 291 lbs 6.4 oz  Constitutional: Awake, alert and in no distress. Sitting up comfortably in bed   Head: Normocephalic. Atraumatic.   Cardiovascular: well-perfused. + edema in bilat lower extremities  Respiratory: Normal respiratory rate and effort.   Musculoskeletal: small muscle twitching around eyes and intermittent twitching of his arms, shoulders  Psychiatric: speaks very slowly and seems to answer questions in a way that isn't quite related to the question or if it is tangentially related he doesn't quite [place] around to how it is related    Medical Decision Making   90 MINUTES SPENT BY ME on the date of service doing chart review, history, exam, documentation & further activities per the note.      Data     I have personally reviewed the following data over the past 24 hrs:    N/A  \   N/A   / N/A     145 112 (H) 46.4 (H) /  109 (H)   3.6 20 (L) 4.80 (H) \       ALT: N/A " AST: N/A AP: N/A TBILI: N/A   ALB: 2.7 (L) TOT PROTEIN: N/A LIPASE: N/A

## 2023-07-16 NOTE — CARE PLAN
"     VS: BP (!) 178/84 (BP Location: Left arm)   Pulse 72   Temp 98.3  F (36.8  C) (Oral)   Resp 16   Ht 1.778 m (5' 10\")   Wt 132.2 kg (291 lb 6.4 oz)   SpO2 98%   BMI 41.81 kg/m         O2: 98% on room air, denies SOB and CP.   Output: Incontinent of bowel & bladder. Briefs and Bassam changed this shift.   Last BM: 07/16/2023   Activity: Ax3 for cares.    Skin: LLE surgical incision, blanchable redness to caridad area and buttocks.   Pain: Managed with tylenol a this shift.   Neuro: A & O x3, pt is intermittently disoriented to time. Intermittently confused. Denies numbness, tingling present.   Dressing: CDI   Diet: Regular diet. BG checks. No novolog given this shift.   LDA: R CVC, primofit external catheter   Equipment: Personal belongings, call light, bedpan, IV pump/pole, pulsate mattress.   Plan: Continue with plan of care, waiting on placement/funding   Additional Info: This shift patient allowed to be cleaned because he was found sleeping on his excretions. Caridad area and buttocks cleaned and new bed sheets, [place] and incontinence pads changed.       "

## 2023-07-16 NOTE — PLAN OF CARE
VS: Temp: 97.6  F (36.4  C) Temp src: Oral BP: (!) 169/77 Pulse: 77   Resp: 16 SpO2: 99 % O2 Device: None (Room air)       O2: Pt.'s O2 sats above 90 on room air, denies shortness of breath and chest pain.    Output: Male purewick in place with brief over it.    Last BM: 07/15/2023   Activity: Not oob this shift. Pt allowed me to reposition slightly in bed but refused further intervention.    Skin: Redness in groin, pt refused caridad care. Miconazole powder applied on day shift. Edema in scrotum, and bilateral lower extremities. LLE incision site.    Pain: Managed with tylenol, last given at 2051. Pain in bilateral lower extremites, groin.    CMS: A&Ox4, denies numbness and tingling.    Dressing: R picc transparent dressing CDI, R hip dressing CDI   Diet: Regular diet, very poor appetite. Did not order dinner. Encouraged drinking supplements in room. Blood sugar checks.   LDA: R chest PICC, external urinary catheter. R hip incision site, dressing CDI   Equipment: IV pole, personal belongings   Plan: Maintain contact precautions, awaiting placement.    Additional Info: Sat up in chair on day shift with ceiling lift.

## 2023-07-16 NOTE — PLAN OF CARE
"Shift: 8683-8793  BP (!) 146/59 (BP Location: Left arm)   Pulse 73   Temp 98.1  F (36.7  C) (Oral)   Resp 12   Ht 1.778 m (5' 10\")   Wt 132.2 kg (291 lb 6.4 oz)   SpO2 97%   BMI 41.81 kg/m       AOx2, confused during rounds @ 0550- pt observed to be hallucinating- yelling at someone to grab him a baseball bat and becoming frustrated about being thrown out the game(?) difficult to assess orientation d/t pt irritability and refusing to answer questions. Pt asked writer to leave room. 30mins later, writer checked in again, pt was asleep    ORA.  Denies CP, SOB, N/V, N/T.  L hip dressing CDI.     Pain: Endorsing pain to LLE managed with PRNs.  Activity: Up A3 lift, NOOB; refusing repositioning this shift.  GI/: Incont. LBM 7/15; voiding adequately via external cath- attempted to change, pt refused.  Diet: Reg, tolerating PO intake. OK appetite. BG check 117  LDA: R chest CVC heparin locked.     Plan: Awaiting placement.   Additional info: RN K+ manage.     Pt able to make needs known at times, however needs anticipated, freq rounding, call light within reach. Contact precaut maintained. Care ongoing.      "

## 2023-07-16 NOTE — PHARMACY-VANCOMYCIN DOSING SERVICE
Pharmacy Vancomycin Note  Date of Service 2023  Patient's  1950   72 year old, male    Indication: Bacteremia  Day of Therapy: started on 6/15/23  Current vancomycin regimen:  Intermittent vancomycin dosing due to HALEY (last dose 1500 mgwas given 23@2110  Current vancomycin monitoring method: Trough (Method 2 = manual dose calculation)  Current vancomycin therapeutic monitoring goal: 15-20 mg/L      Current estimated CrCl = Estimated Creatinine Clearance: 20.2 mL/min (A) (based on SCr of 4.59 mg/dL (H)).    Creatinine for last 3 days  2023:  9:52 AM Creatinine 4.66 mg/dL  7/15/2023:  7:27 AM Creatinine 4.80 mg/dL  2023: 10:14 AM Creatinine 4.59 mg/dL    Recent Vancomycin Levels (past 3 days)  7/15/2023:  7:27 AM Vancomycin 24.9 ug/mL  2023: 10:14 AM Vancomycin 22.2 ug/mL    Vancomycin IV Administrations (past 72 hours)      No vancomycin orders with administrations in past 72 hours.                Nephrotoxins and other renal medications (From now, onward)    Start     Dose/Rate Route Frequency Ordered Stop    23 2200  vancomycin (VANCOCIN) 1,500 mg in 0.9% NaCl 250 mL intermittent infusion         1,500 mg  over 90 Minutes Intravenous ONCE 23 1318      23 1600  furosemide (LASIX) tablet 60 mg         60 mg Oral 2 TIMES DAILY (Diuretics and Nitrates) 23 1216      23 0813  vancomycin place montilla - receiving intermittent dosing         1 each Intravenous SEE ADMIN INSTRUCTIONS 23 0813               Contrast Orders - past 72 hours (72h ago, onward)    None          Interpretation of levels and current regimen:  Vancomycin level is reflective of supratherapeutic level    Has serum creatinine changed greater than 50% in last 72 hours: No    Urine output:  unable to determine    Renal Function: Stable      Plan:  1. Will give vancomycin 1500 mg IV x1 on 23@1000 expecting vancomycin level within the goal range by that time.  2. Vancomycin  monitoring method: Trough (Method 2 = manual dose calculation)  3. Vancomycin therapeutic monitoring goal: 15-20 mg/L  4. Pharmacy will check vancomycin levels as appropriate in 3-5 Days.  5. Serum creatinine levels will be ordered daily for the first week of therapy and at least twice weekly for subsequent weeks.    Any oDwd RPH

## 2023-07-16 NOTE — PROGRESS NOTES
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     #HALEY - related to vancomycin, MRSA infection, hemodynamic instability and diuresis  After previous HALEY, creatinine had improved to 1.4 mg/dL on 6/26 but now running around 4 mg/dl  Creatinine stable at 4.8 mg/dL with BUN 46  -No metabolic or volume indication for dialysis  - vanco monitoring per pharmacy - note level is not going down suggesting GFR is lower than what creatinine is estimating  - medications adjusted for low GFR  - suggest time limited trial dialysis to see if mental status and ability to participate in therapies improves thought notably, when I brought this up with Don - he said he is not too keen on that idea. Warrants further discussion. I suggested he try to give it some thought and discuss with family. His brother (not biological brother) is on dialysis.    # psychomotor slowing, occasional muscle twitching without asterixus - question uremia and will consider time limited trial dialysis to see if improving. Will need dialysis access (please ask IR and recommend tunneled CVC because would do time limited trial of about 2 weeks)    # HTN/volume - weight up from baseline with IVF  - IVF stopped 7/12/13  - continue furosemide to 60 mg po bid  - start amlodipine 5 mg daily     # Low bicarb- bicarbonate currently 19 and stable without recent blood gas.  - monitor    #Anemia- related to illness.  Hemoglobin is 8.1 on 7/11/23 and stable     #Left hip prosthetic joint infection- MRSA.  Treated with vancomycin and ceftazidime.  Management per primary team and orthopedics.    Discussed with Dr. Barrera Muro MD  [facility]  Department of Medicine  Division of Nephrology and Hypertension  [facility]  Vocera Web Console       Interval History :   Reviewed provider and nursing  notes for past 24 hours.    He continues to have leg pain and does not elaborate more aside from the fact that left hurts more than right. Unclear appetite, he cannot remember what he ate today. No fever or chills, no chest pain, no nausea or vomiting. Continues to have some LE edema.    Physical Exam:   I/O last 3 completed shifts:  In: 240 [P.O.:240]  Out: 500 [Urine:500]  Vitals: /43  Pulse 67  Resp 16  SpO2 99%  Temp 97.7  GENERAL APPEARANCE: awake, no distress  EYES:  no scleral icterus  PULM: Equal air movement and lungs clear bilaterally  CV: regular rhythm, normal rate.  No rub/gallop heard  Abd obese, soft, non tender  Extremities: 1-2+ LE edema of arms and legs  NEURO: Psychomotor slowing.  Face symmetric.     Labs:   All labs reviewed by me  Electrolytes/Renal -   Recent Labs   Lab Test 07/16/23  1215 07/16/23  1014 07/16/23  0729 07/15/23  0752 07/15/23  0727 07/14/23  1407 07/14/23  0952 07/12/23  1209 07/12/23  0911 07/05/23  0823 07/05/23  0700 07/02/23  1754 07/02/23  1149   NA  --  142  --   --  145  --  144   < > 143   < > 142   < > 137   POTASSIUM  --  3.6  --   --  3.6  --  3.7   < > 4.1   < > 4.5   < > 4.3   CHLORIDE  --  108*  --   --  112*  --  112*   < > 112*   < > 108*   < > 107   CO2  --  22  --   --  20*  --  20*   < > 19*   < > 16*   < > 21*   BUN  --  46.6*  --   --  46.4*  --  45.4*   < > 43.6*   < > 46.3*   < > 39.1*   CR  --  4.59*  --   --  4.80*  --  4.66*   < > 4.44*   < > 3.30*   < > 2.20*   * 112* 113*   < > 112*   < > 109*   < > 105*   < > 99   < > 128*   MAIKOL  --  8.3*  --   --  8.3*  --  8.2*   < > 8.3*   < > 7.7*   < > 7.9*   MAG  --   --   --   --   --   --   --   --  1.6*  --  1.7  --  1.7   PHOS  --  4.9*  --   --  4.7*  --  4.5   < > 5.0*   < >  --   --   --     < > = values in this interval not displayed.       CBC -   Recent Labs   Lab Test 07/11/23  0656 07/10/23  1316 07/09/23  1145   WBC 8.5 9.0 11.8*   HGB 8.1* 8.2* 8.2*    269 299       LFTs -    Recent Labs   Lab Test 07/16/23  1014 07/15/23  0727 07/14/23  0952 07/06/23  1708 07/06/23  0713 07/04/23  0627 06/22/23  0711 06/20/23  0704   ALKPHOS  --   --   --   --  103 99  --  93   BILITOTAL  --   --   --   --  <0.2 0.2  --  0.2   ALT  --   --   --   --  12 14  --  14   AST  --   --   --   --  9 11  --  14   PROTTOTAL  --   --   --   --  5.3* 5.9*  --  5.9*   ALBUMIN 2.6* 2.7* 2.6*   < > 2.4*  2.4* 2.6*   < > 2.3*    < > = values in this interval not displayed.       Iron Panel -   Recent Labs   Lab Test 06/26/23  0513   IRON 30*   IRONSAT 21   FADI 2,616*         Imaging:  Reviewed     Current Medications:    amLODIPine  5 mg Oral Daily     apixaban ANTICOAGULANT  2.5 mg Oral BID     atorvastatin  40 mg Oral Daily     cefTAZidime  2 g Intravenous Q24H     ferrous sulfate  325 mg Oral Every Other Day     folic acid  1 mg Oral Daily     furosemide  60 mg Oral BID     heparin lock flush  5-20 mL Intracatheter Q24H     insulin aspart  1-7 Units Subcutaneous TID AC     insulin aspart  1-5 Units Subcutaneous At Bedtime     lactobacillus rhamnosus (GG)  1 capsule Oral BID     miconazole   Topical BID     multivitamin w/minerals  1 tablet Oral Daily     senna-docusate  1 tablet Oral BID     sodium chloride (PF)  10 mL Intracatheter Q8H     sodium chloride (PF)  10-40 mL Intracatheter Q8H     [START ON 7/17/2023] vancomycin  1,500 mg Intravenous Once     vancomycin place montilla - receiving intermittent dosing  1 each Intravenous See Admin Instructions       Gypsy Muro MD

## 2023-07-16 NOTE — PROGRESS NOTES
[facility]    Medicine Progress Note - Hospitalist Service, GOLD TEAM 21    Date of Admission:  6/17/2023    Assessment & Plan   Waldo Bustos is a 72 year old male with a history of type 2 diabetes, hyperlipidemia, chronic back pain, JAIR, DVT/PE on DOAC, and chronic left hip prosthetic joint infection initially admitted to [facility] on 11/22/2022 for scheduled explant of left hip prosthesis, debridement, and reconstruction with antibiotic spacer.  Postop course complicated by profound deconditioning.  He was admitted to TCU initially on 12/23/2022 for physical rehabilitation, however therapies no longer worked with patient after several months of an in bed therapy and his refusals to mobilize.  Underwent biopsy on 4/7/2023 without evidence of infection.  He was transferred back to [facility] for left total hip arthroplasty and reimplantation on 5/26/23 with Dr. Meyers.  Presented again to [place] for persistent MRSA bacteremia on 6/17.     Brief Hospital Summary:    Pt was admitted to continue infectious workup with MRSA bacteremia.  Admission blood cultures were positive, ID and Ortho were consulted. KEILA was obtained on 6/19, which was negative for endocarditis, ortho took for washout/debridement of hip on 6/21. ID recommending 6 weeks abx (6/21-8/1), patient recovering from HALEY, but given history of HALEY's and need for long term vanc plus comorbids making declining kidney function in the future probable nephrology doesn't feel comfortable with patient getting a PICC -- Tunneled internal jugular line placed. 7/4-7/9 course complicated by cefepime neurotoxicity limiting PO intake and participation in mobility.    Plan for today:  -spoke to renal -- we are both concerned about uremia based on his mental status and exam, poor clearance of vanc and considering whether/not a time-limited course of HD would benefit him -- would obviously need to involve  family in this discussion re: line placement and initiation  -will start amlodipine for hypertension  -will check with ID re: possibility that ceftaz is also causing some CNS toxicity and re: switching to PO cipro (as per the prior consultation note    AMS due to Delirium from illness/hospitalization and possible toxic metabolic encephalopathy, likely 2/2 cefepime neurotoxicity, improved initially but plateaued  - off cefepime since 7/4 with improvement several days later. This is likely etiology.  - Nephrology following  - CT head, ammonia, vbg WNL.    - neurology consulted 7/6, EEG without seizures   - thiamine per neuro x5 days (completed)    MRSA bacteremia due to possible left hip septic arthritis vs other source Blood cultures: 6/14, 6/15 + for MRSA (4/4), and 6/16 (1/2 GPCs) and 6/17 (2/2) GPCs. - started vancomycin 6/15.   6/17 sinovial fluid cultures with staph and klebsiella. Purulent drainage noted at incision site at TCU, CT left hip concerning for infection (no mention of joint, mostly anterior in soft tissue) and couldn't exclude abscess/inf  - Continue vancomycin, added rocephin 6/21 and changed to cefepime 6/23, ceftazidime 7/4; ID recommends 6 weeks IV vanc (last day 8/1/23) and can transition to oral cipro 750 mg BID at discharge, needs weekly vanc level, cbcd, cmp, crp while on IV abx, regular ECG monitoring of QTc on high dose cipro --> follow up outpatient with ID prior to discontinuing antibiotics (needs appt end of July)  - blood cultures negative 6/19 and 6/20 (last + was 6/17)    Acute Kidney Injury - multifactorial, see below   Nephrology consulted, appreciate recs.   - baseline creatinine 1.0 or 1.1  - etiology: suspect combo of sepsis/inflammation, hypovolemia, blood loss anemia following surgery, vancomycin  - volume (hypovolemia/congestion): suspect hypervolemic based on lower extremities, HTN  - nephrotoxins: vancomycin  - diabetes: yes; last A1C < 6, though  - sepsis: resolved now  -  anemia: yes, ongoing  - cardiac performance: normal based on KEILA from 6/19  - inflammation: from surgery and infection  - creatinine downtrending; neph ordered C3 and 4, normal  Overall kidney function is gradually worse but very slow downtrend-- tried to broach whether or not he would even want dialysis but not sure he is fully understanding conversation and if he is, he is not able to express his wishes or he is avoiding expressing his wishes  -Will discuss with family that we're approaching need for line placement and need for dialysis - could trial a time-limited course of HD unless family thought this wouldn't be in line with his wishes    Bilateral LE edema due to volume overload from HALEY and iatrogenic from blood transfusions  Hyperesthesia of bilateral lower extremities (no signs of cellulitis as of 7/13)  -Resuming diuresis 7/12  -difficult to saw why legs feel painful to even light touch    Hypoxemic Respiratory Failure due to immobility, surgery - resolved  - continue to follow; encourage IS    Acute blood loss anemia following surgery, complicated by use of blood thinner  - 3 units day after surgery did joint washout (6/22) for acute blood loss anemia, 1 unit 6/25  - watch for bleeding; hold apixaban if need be - had unprovoked dvt in 2018 and would be on eliquis for life, high risk of recurrent DVT given recent procedures and illness/immobility  - iron and folic acid daily    S/p left hip explantation of left hip antibiotic spacer and revision of left total hip arthroplasty (5/26/2023)  - ortho performed washout 6/21  -Activity: 50% PWB LLE with posterior hip precautions [id number]  -AFO ordered for foot drop but patient refusing  -PT/OT  -Knee to be kept even in a foam leg elevator to relax sciatic nerve  -Pain: holding robaxin 4 times a day with mental status changes (discontinuing), APAP PRN   -stop tramadol (7/11) due to renal function   -increase oxycodone 2.5-5 mg q6h PRN- taking very minimal amts  so unlikely to be contributing to mental status    Hx of unprovoked DVT in 2018   - on eliquis 5 mg bid (dose reduced to 2.5 mg BID 7/10 with impaired renal function)     Type 2 diabetes mellitus  - Last hemoglobin A1c 5.6% on 6/16/2023.  On glipizide, metformin, Actos, and Victoza prior to hospitalizations and surgery.   - continue ISS    Constipation, improved  -Continue Colace 100 mg twice daily  -Continue senna 1 tablet twice daily-- had briefly increased to 2 tab twice daily due to lack of stool, now improved  -Continue MiraLAX daily     Hypomagnesemia  -monitor and replete      Severe malnutrition in the context of acute on chronic illness  Goal for patient is to consume % of meals TID. Goal is 8715-9412 kcals/daily. Protein needs- 100-126 grams/daily.   -RD consult and appreciate recs  -regular diet  -Weekly weights, daily I/O     Adjustment disorder with depressed mood  Prolonged grief disorder  Patient has been with depressed mood in setting of ongoing health issues, as evidenced by lack of motivation to work with therapies during both TCU admissions.  Past history of passive suicidal statements. Psychiatry saw patient during hospital admission on 5/31 due to lack of motivation with therapies. Patient declined interest in starting any psychiatric medications. Was willing to engage with health psychology.   -Outpatient health psychology consult  -Continue to monitor for willingness to start antidepressant therapy  -tried to discuss mood 7/15 but he didn't fully answer questions, seemed to talk about different aspect of his health that wasn't quite related to mood     HTN: stopping low dose IVF (7/11), starting diuresis 7/12, increased diuresis 7/13; started amlodipine 7/16    Stable and Resolved Issues:  Hyperlipidemia-continue PTA Lipitor 40 mg daily  JAIR-CPAP when sleeping       Diet: Regular Diet Adult  Snacks/Supplements Adult: Other; Please send scrambled eggs with cheddar cheese,2  hurley slices  "and Maltese toast for breakfast.; With Meals    DVT Prophylaxis: DOAC  Tran Catheter: Not present  Lines: PRESENT      CVC Single Lumen Right Internal jugular Non - valved (open ended);Tunneled;Power injectable-Site Assessment: WDL      Cardiac Monitoring: None  Code Status: Full Code      Clinically Significant Risk Factors              # Hypoalbuminemia: Lowest albumin = 2.2 g/dL at 7/8/2023  8:51 AM, will monitor as appropriate    # Acute Kidney Injury, unspecified: based on a >150% or 0.3 mg/dL increase in last creatinine compared to past 90 day average, will monitor renal function         # Severe Obesity: Estimated body mass index is 41.81 kg/m  as calculated from the following:    Height as of this encounter: 1.778 m (5' 10\").    Weight as of this encounter: 132.2 kg (291 lb 6.4 oz).   # Severe Malnutrition: based on nutrition assessment         Disposition Plan    TBD, pending clinical improvement and placement       Shelby Rust MD  Hospitalist Service, GOLD TEAM 21  [facility]  Securely message with [facility] [facility] (more info)  Text page via [facility] Paging/Directory   See signed in provider for up to date coverage information  ______________________________________________________________________    Interval History   No fever/chills. He answers minimally this AM but also is still sleepy, hasn't eaten breakfast yet. Denies shortness of breath. Nursing notes reported some hallucinations overnight.     Physical Exam   Vital Signs: Temp: 98.5  F (36.9  C) Temp src: Oral BP: (!) 178/73 Pulse: 75   Resp: 16 SpO2: 98 % O2 Device: None (Room air)    Weight: 291 lbs 6.4 oz  Constitutional: Sleepy, leaning to the side of his bed, wakes to voice but eventually dozes off again  Head: Normocephalic. Atraumatic.   Cardiovascular: Regular rate and rhythm. No murmurs, clicks, gallops or rubs. +Peripheral edema in bilat lower extremities  Respiratory: Clear to auscultation without " wheezes or crackles. Normal respiratory rate and effort.   Gastrointestinal: Abdomen soft, non-tender. BS normal.  Neuro: occasional jerks of muscles in arms. Very slow to respond to questions      Medical Decision Making   60 MINUTES SPENT BY ME on the date of service doing chart review, history, exam, documentation & further activities per the note.      Data     I have personally reviewed the following data over the past 24 hrs:    N/A  \   N/A   / N/A     142 108 (H) 46.6 (H) /  148 (H)   3.6 22 4.59 (H) \       ALT: N/A AST: N/A AP: N/A TBILI: N/A   ALB: 2.6 (L) TOT PROTEIN: N/A LIPASE: N/A

## 2023-07-17 NOTE — PROGRESS NOTES
"Orthopedic Surgery Progress Note: 07/17/2023    Subjective:   NAEON. Reviewed RN and consultant notes. Confusion continues; renal broaching topic of dialysis; patient not interested but brother may get involved. Incontinent. Continues on ceftaz, vanc but maybe switching to cipro given creatinine. Therapies signed off as patient not participatory.  Eliquis for VTE    Objective:   BP (!) 159/76 (BP Location: Left arm, Patient Position: Semi-Fuentes's, Cuff Size: Adult Large)   Pulse 75   Temp 98.9  F (37.2  C) (Oral)   Resp 16   Ht 1.778 m (5' 10\")   Wt 132.2 kg (291 lb 6.4 oz)   SpO2 95%   BMI 41.81 kg/m    I/O this shift:  In: 320 [P.O.:320]  Out: 175 [Urine:175]  General: NAD. Resting comfortably in bed.  Respiratory: Breathing comfortably on RA.  Musculoskeletal: Focused exam of the left lower extremity: Dressings c/d/i.  Fires GSC, TA, FHL, and EHL minimally. Incredibly sensitive to touch on feet. Overall decreased sensation distally. Brisk capillary refill.     Laboratory Data:  Lab Results   Component Value Date    WBC 8.5 07/11/2023    HGB 8.1 (L) 07/11/2023     07/11/2023      Intraoperative cultures   6/21/2023: MRSA, C Acnes, Pseudomonas    Assessment & Plan:   Waldo Bustos is a 72 year old male status post left revision total hip arthroplasty on 5/26/2023 with Dr. Meyers now presenting with left hip prosthetic joint infection status post left hip irrigation and debridement on 6/21/2023 with Dr. Meyers.    Postoperatively noted to have confusion and increasing creatinine. Thorough workup by primary, Neuro, renal suggests metabolic encephalopathy. Possible dialysis this week, per renal.    ABLA. Intermittent transfusions while hospitalized. Good response.    CRP stabilizing in 30s-40s, repeating today. C acnes, MRSA in cultures. Treating with vanc/cefepime, per ID recs. Appreciate assistance.    Ortho will continue to follow every few days. Please do not hesitate to page with " questions/concerns.    Goals for Today:  - Pain control  - medical management    Ortho Plan:  - Activity: Up with assist until independent. Posterior hip precautions x 3 months (no flexion beyond 90 degrees, no adduction beyond midline, and no internal rotation beyond midline).  - Weightbearing Status: WBAT LLE.  - Pain Management: Transition from IV to PO as tolerated.  - Antibiotics: Vancomycin and ceftazidime, per ID/primary.  - Diet: OK for a diet from an orthopedic perspective.  - DVT Prophylaxis: SCDs and PTA apixaban okay from an ortho perspective  - Imaging: None pending.  - Labs: Labs PRN and per primary  - Bracing/Splinting: Abduction pillow while in bed.  - Dressings: Dressing changed 7/3.  - Drains: GUERRERO drain removed 7/3  - Tran Catheter: None. Defer to primary but consider  - Physical Therapy/Occupational Therapy: Evaluation and treatment. Signed off week of 7/12 due to patient lack of participation.  - Cultures: Intraoperative cultures 6/21/2023.  - Follow-up: Clinic with Dr. Meyers's team TBD (pending dispo plan). Will plan to get XR at that time.    - Disposition: TCU. Okay to discharge from an ortho perspective.     Hannah Starr MD, PGY-4  Orthopedics  Pager: [phone]

## 2023-07-17 NOTE — PLAN OF CARE
"Shift: 1900-0730  BP (!) 159/76 (BP Location: Left arm, Patient Position: Semi-Fuentes's, Cuff Size: Adult Large)   Pulse 75   Temp 98.9  F (37.2  C) (Oral)   Resp 16   Ht 1.778 m (5' 10\")   Wt 132.2 kg (291 lb 6.4 oz)   SpO2 95%   BMI 41.81 kg/m         AOx3-4, intermittent confusion; slow responses and word finding difficulty at times.  ORA.  Denies CP, SOB, N/V, N/T.  L hip dressing CDI.  Redness to bottom, inner thighs, and folds; pt allowed a bed bath this shift.  Refused miconazole powder; barrier cream applied.      Pain: Endorsing pain to LLE managed with PRNs.  Activity: Up A3 lift, NOOB;  Supine head elevated. Denied repositioning for most of shift.   GI/: Incont B/B. LBM 7/16, refused senna; refusing external cath. Voiding via urinal at times.  Diet: Reg, tolerating PO intake, ate 100% of dinner tray.  BG checks: 142, 144  LDA: R chest CVC heparin locked btwn ABX.      Plan: Awaiting placement. Per notes, possible 2wk HD trial.  Additional info: RN K+ manage. Declined incont cares this morning.      Pt able to make needs known at times, however needs anticipated, freq rounding, call light within reach. Contact precaut maintained. Care ongoing.      "

## 2023-07-17 NOTE — PROGRESS NOTES
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  Postop course complicated by profound deconditioning.  He was admitted to TCU initially on 12/23/2022 for physical rehabilitation, however therapies no longer worked with patient after several months of an in bed therapy and his refusals to mobilize.  Underwent biopsy on 4/7/2023 without evidence of infection.  He was transferred back to [facility] for left total hip arthroplasty and reimplantation on 5/26/23 with Dr. Meyers.  Presented again to [place] for persistent MRSA bacteremia on 6/17.     Brief Hospital Summary:    Pt was admitted to continue infectious workup with MRSA bacteremia.  Admission blood cultures were positive, ID and Ortho were consulted. KEILA was obtained on 6/19, which was negative for endocarditis, ortho took for washout/debridement of hip on 6/21. ID recommending 6 weeks abx (6/21-8/1), patient recovering from HALEY, but given history of HALEY's and need for long term vanc plus comorbids making declining kidney function in the future probable nephrology doesn't feel comfortable with patient getting a PICC -- Tunneled internal jugular line placed. 7/4-7/9 course complicated by cefepime neurotoxicity limiting PO intake and participation in mobility.    Plan for today:  -Again tried to engage with Don about the possibility of needing dialysis and discussing what that would mean  -prior to discussing deep topics like that he is engaged in the conversation but once talking about more complex  topics, he stares off into space and has long pauses between partial sentences and tends to shift the items on his table around or try to reposition himself but cannot really convey what he is thinking  --it makes it really challenging to know if he has capacity to make such a major decision but I suspect he does not because he seems to get lost in thought.   --Spoke to his brother (Ivan) 7/16 afternoon- Ivan said that Don tends to leave decisions up to Ivan  --will discuss that with Don  -Appreciate palliative care involvement  -Discussed with ID who (despite improved QTc on EKG) still doesn't recommend transitioning to cipro orals yet- would recommend continuing ceftaz     This is a complex case given we have a gentleman who has had an overall decline in the last 6 months and in the preceding time at TCU was not participating in therapies/refusing many cares, suggesting he may want a more comfort focused approach, yet when faced with a conversation about goals of care, he seems to lack the capacity with make these decisions alone which may in part be due to uremia (which could potentially be better once on dialysis. However, even if he improves on dialysis and he can participate in the conversations, he is still incredibly deconditioned, and if he continues to refuse cares he will continue to deteroriate physically and will be prone to further complications of being bedbound.     AMS due to Delirium from illness/hospitalization and possible toxic metabolic encephalopathy, likely 2/2 cefepime neurotoxicity, improved initially but plateaued  - off cefepime since 7/4 with improvement several days later. This is likely etiology.  - Nephrology following  - CT head, ammonia, vbg WNL.    - neurology consulted 7/6, EEG without seizures   - thiamine per neuro x5 days (completed)    MRSA bacteremia due to possible left hip septic arthritis vs other source Blood cultures: 6/14, 6/15 + for MRSA (4/4), and 6/16 (1/2 GPCs)  and 6/17 (2/2) GPCs. - started vancomycin 6/15.   6/17 sinovial fluid cultures with staph and klebsiella. Purulent drainage noted at incision site at TCU, CT left hip concerning for infection (no mention of joint, mostly anterior in soft tissue) and couldn't exclude abscess/inf  - Continue vancomycin, added rocephin 6/21 and changed to cefepime 6/23, ceftazidime 7/4; ID recommends 6 weeks IV vanc (last day 8/1/23) and can transition to oral cipro 750 mg BID at discharge, needs weekly vanc level, cbcd, cmp, crp while on IV abx, regular ECG monitoring of QTc on high dose cipro --> follow up outpatient with ID prior to discontinuing antibiotics (needs appt end of July)  - blood cultures negative 6/19 and 6/20 (last + was 6/17)    Acute Kidney Injury - multifactorial, see below   Nephrology consulted, appreciate recs.   - baseline creatinine 1.0 or 1.1  - etiology: suspect combo of sepsis/inflammation, hypovolemia, blood loss anemia following surgery, vancomycin  - volume (hypovolemia/congestion): suspect hypervolemic based on lower extremities, HTN  - nephrotoxins: vancomycin  - diabetes: yes; last A1C < 6, though  - sepsis: resolved now  - anemia: yes, ongoing  - cardiac performance: normal based on KEILA from 6/19  - inflammation: from surgery and infection  - creatinine downtrending; neph ordered C3 and 4, normal  Overall kidney function is gradually worse but very slow downtrend-- tried to broach whether or not he would even want dialysis but not sure he is fully understanding conversation and if he is, he is not able to express his wishes or he is avoiding expressing his wishes  -On 7/16, discussed with brother Ivan that we're approaching need for line placement and need for dialysis - could trial a time-limited course (2 weeks per renal) of HD to see if mental status improves enough to participate in further goals of care conversations    Bilateral LE edema due to volume overload from HALEY and iatrogenic from blood  transfusions  Hyperesthesia of bilateral lower extremities (no signs of cellulitis as of 7/13)  -Resuming diuresis 7/12  -difficult to saw why legs feel painful to even light touch    Hypoxemic Respiratory Failure due to immobility, surgery - resolved  - continue to follow; encourage IS    Acute blood loss anemia following surgery, complicated by use of blood thinner  - 3 units day after surgery did joint washout (6/22) for acute blood loss anemia, 1 unit 6/25  - watch for bleeding; hold apixaban if need be - had unprovoked dvt in 2018 and would be on eliquis for life, high risk of recurrent DVT given recent procedures and illness/immobility  - iron and folic acid daily    S/p left hip explantation of left hip antibiotic spacer and revision of left total hip arthroplasty (5/26/2023)  - ortho performed washout 6/21  -Activity: 50% PWB LLE with posterior hip precautions [id number]  -AFO ordered for foot drop but patient refusing  -PT/OT  -Knee to be kept even in a foam leg elevator to relax sciatic nerve  -Pain: holding robaxin 4 times a day with mental status changes (discontinuing), APAP PRN   -stop tramadol (7/11) due to renal function   -increase oxycodone 2.5-5 mg q6h PRN- taking very minimal amts so unlikely to be contributing to mental status    Hx of unprovoked DVT in 2018   - on eliquis 5 mg bid (dose reduced to 2.5 mg BID 7/10 with impaired renal function)     Type 2 diabetes mellitus  - Last hemoglobin A1c 5.6% on 6/16/2023.  On glipizide, metformin, Actos, and Victoza prior to hospitalizations and surgery.   - continue ISS    Constipation, improved  -Continue Colace 100 mg twice daily  -Continue senna 1 tablet twice daily-- had briefly increased to 2 tab twice daily due to lack of stool, now improved  -Continue MiraLAX daily     Hypomagnesemia  -monitor and replete      Severe malnutrition in the context of acute on chronic illness  Goal for patient is to consume % of meals TID. Goal is 7616-0903  kcals/daily. Protein needs- 100-126 grams/daily.   -RD consult and appreciate recs  -regular diet  -Weekly weights, daily I/O     Adjustment disorder with depressed mood  Prolonged grief disorder  Patient has been with depressed mood in setting of ongoing health issues, as evidenced by lack of motivation to work with therapies during both TCU admissions.  Past history of passive suicidal statements. Psychiatry saw patient during hospital admission on 5/31 due to lack of motivation with therapies. Patient declined interest in starting any psychiatric medications. Was willing to engage with health psychology.   -Outpatient health psychology consult  -Continue to monitor for willingness to start antidepressant therapy  -tried to discuss mood 7/15 but he didn't fully answer questions, seemed to talk about different aspect of his health that wasn't quite related to mood     HTN: stopping low dose IVF (7/11), starting diuresis 7/12, increased diuresis 7/13; started amlodipine 7/16    Stable and Resolved Issues:  Hyperlipidemia-continue PTA Lipitor 40 mg daily  JAIR-CPAP when sleeping       Diet: Regular Diet Adult  Snacks/Supplements Adult: Other; Please send scrambled eggs with cheddar cheese,2  hurley slices and Romansh toast for breakfast.; With Meals    DVT Prophylaxis: DOAC  Tran Catheter: Not present  Lines: PRESENT      CVC Single Lumen Right Internal jugular Non - valved (open ended);Tunneled;Power injectable-Site Assessment: WDL      Cardiac Monitoring: None  Code Status: Full Code      Clinically Significant Risk Factors              # Hypoalbuminemia: Lowest albumin = 2.2 g/dL at 7/8/2023  8:51 AM, will monitor as appropriate    # Acute Kidney Injury, unspecified: based on a >150% or 0.3 mg/dL increase in last creatinine compared to past 90 day average, will monitor renal function         # Severe Obesity: Estimated body mass index is 41.81 kg/m  as calculated from the following:    Height as of this encounter:  "1.778 m (5' 10\").    Weight as of this encounter: 132.2 kg (291 lb 6.4 oz).   # Severe Malnutrition: based on nutrition assessment         Disposition Plan    pending clinical improvement       Shelby Rust MD  Hospitalist Service, GOLD TEAM 21  [facility]  Securely message with [employer] (more info)  Text page via [employer] Paging/Directory   See signed in provider for up to date coverage information  ______________________________________________________________________    Interval History   No fever/chills. Feels pretty good. Says he was interested in getting up out of bed and getting to bathroom but the ceiling lift won't go that far. Has had breakfast and lunch. Has tenderness all over the body even with light touch.   He mentioned being \"not thrilled\" about dialysis. \"I don't understand it\" when talking about dialysis. When I explain he starts to talk about how his girlfriend Claire had a niece who  but doesn't totally say why/how or how that was related (?she was on dialysis).     Physical Exam   Vital Signs: Temp: 98.1  F (36.7  C) Temp src: Oral BP: (!) 152/67 Pulse: 76   Resp: 16 SpO2: 96 % O2 Device: None (Room air)    Weight: 291 lbs 6.4 oz  Constitutional: Awake, alert and in no distress. Leaning to the side of his bed, appears to be uncomfortable to some extent, trying to shift himself or reposition himself but also appears stiff like he doesn't want to move  Head: Normocephalic. Atraumatic.   Cardiovascular: bilateral peripheral edema   Respiratory:  Normal respiratory rate and effort.   Musculoskeletal: intermittent muscle jerks/twitches  Skin: central line in place on upper right chest    Medical Decision Making   90 MINUTES SPENT BY ME on the date of service doing chart review, history, exam, documentation & further activities per the note.      Data     I have personally reviewed the following data over the past 24 hrs:    N/A  \   N/A   / N/A     144 " 108 (H) 47.5 (H) /  114 (H)   3.5 23 4.59 (H) \       ALT: N/A AST: N/A AP: N/A TBILI: N/A   ALB: 2.8 (L) TOT PROTEIN: N/A LIPASE: N/A       Procal: N/A CRP: 18.94 (H) Lactic Acid: N/A

## 2023-07-17 NOTE — PROGRESS NOTES
"  VS: BP (!) 152/67 (BP Location: Left arm)   Pulse 76   Temp 98.1  F (36.7  C) (Oral)   Resp 16   Ht 1.778 m (5' 10\")   Wt 132.2 kg (291 lb 6.4 oz)   SpO2 96%   BMI 41.81 kg/m       O2: O2 >96% on RA   Output: Incontinent of B&B.   Last BM: LBM 7/16 with incontinence   Activity: Pt is an assist of 2-3 and lift when moving from bed. This shift the pt didn't move from his bed and refused all repositioning interventions.    Skin: Skin has scattered bruising on lower extremities. Pts lower back and buttocks region showing signs of redness and irritation on touch. Pt informed of potential for pressure injury to occur, but still refused repositioning and powder to the areas.    Pain: Pt reports feeling pain but does not give a rating or request pain interventions for it.    CMS: A&Ox3   Dressing: CVC dressing CDI   Diet: Regular diet, needs assistance in getting meals ordered   LDA: No LDA for this pt.    Equipment: N/A   Plan: Continue POC   Additional Info: At 1830 pt was approached to change his soiled pull-up from urine incontinence. Pt refused to have his brief changed or cleaned in any way, stating that he wants to go to the bathroom to wipe himself and have a BM. When it was explained that this wouldn't be possible due to the patients physical condition and the lack of tools on the unit to accommodate the request he declined any further intervention. Interventions he declined included a bedpan or a change after using his brief. Education on the importance of changing his soiled brief and the effect it could have on his skin was provided. Pt continued to decline any intervention and stated he would walk himself to the restroom. This action was advised against, and the pts bed alarm was turned on.          "

## 2023-07-17 NOTE — PROGRESS NOTES
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 on 11/22/2022 for scheduled explant of left hip prosthesis, debridement, and reconstruction with antibiotic spacer.  Postop course complicated by profound deconditioning.  He was admitted to TCU initially on 12/23/2022 for physical rehabilitation, however therapies no longer worked with patient after several months of an in bed therapy and his refusals to mobilize.  Underwent biopsy on 4/7/2023 without evidence of infection.  He was transferred back to [facility] for left total hip arthroplasty and reimplantation on 5/26/23 with Dr. Meyers.  Presented again to [place] for persistent MRSA bacteremia on 6/17. His course has been c/b HALEY. Baseline creat 1.0 as recent as 6/19/23).       Assessment & Recommendations:     1. HALEY - Stable. Creat 4.5 ( GFR 13). He may be in early recovery. Creat was 4.8 on 7/13 with Vanc level of 28.2.   Creat hasn't dropped below 3 since 7/5/23  UO difficult to assess given incontinence   - Etiology for his HALEY : Vanco ( has had supratherapeutic levels in the upper 20's to 31), Sepsis, hemodynamics, diuresis   - Cystatin C w/GFR today is essentially the same ( 4.2/11 ml/mn GFR)   - BUN 47   - There has been some discussion regarding trial of HD for possible uremic symptoms but patient not interested ( he was quite sleepy and I don't feel we had a good conversation). Additionally, his apathy appears to be chronic and not acutely reflective of uremia. No indication for HD today. He is stable   - Continue to monitor for renal recovery with daily chemistry labs/strict I/O and daily weights    2 Volume status - No edema/dyspnea. Not on supplemental oxygen. Bp 150's/. Intake 560 ml, Output 250 ml+ ( incontinent). On Furosemide  "60 mg bid   - Continue Furosemide   - Please weigh daily. Zero bed    - Continue I/O    3. HTN - Borderline control in HALEY. Bp 150's/. No edema  Current regimen: Amlodipine 5 mg every day   - Avoid hypotension. B/p ok for today    4. MRSA bacteremia- Will be completing 6 wks of Vancomycin on 8/1/23 and transitioning to Cipro ( dose will need to be adjusted to his renal function at the time of transition)    5. Electrolytes - No acute concerns. K 3.5 Na 144    6 Acid base - No acute concerns. Bicarb 23    7. BMD - Ca 8.4 Phos 5.0 albumin 2.8   - Malnutrition present: Getting MVI. Would benefit from dietary supplement   - No need for Phos binder at this time   - Check Vit D    8. Anemia - Hgb 8.5   - Etiology acute illness   - On Iron, folic acid   - Iron studies 6/26/23: Ferritin 2616, Fe 30 IS 21   - Does have DVT history on Eliquis   - Transfusions per primary team    Recommendations were communicated to primary team via progress note    Tiffanie Maddox NP   Division of Renal Disease and Hypertension  [facility]  [email or web address]  Vocera Web Console    Interval History :   Nursing and provider notes from last 24 hours reviewed.  Creat is stable  Non oliguric/incontinent    Review of Systems:   I reviewed the following systems:  Difficult to obtain given sleepiness    Physical Exam:   I/O last 3 completed shifts:  In: 560 [P.O.:560]  Out: 350 [Urine:350]   BP (!) 152/67 (BP Location: Left arm)   Pulse 76   Temp 98.1  F (36.7  C) (Oral)   Resp 16   Ht 1.778 m (5' 10\")   Wt 132.2 kg (291 lb 6.4 oz)   SpO2 96%   BMI 41.81 kg/m       GENERAL APPEARANCE: Very sleepy. Having difficulty staying awake for visit. No family available at bedside  EYES:  no scleral icterus, pupils equal  PULM: lungs CTA. Not on supplemental oxygen   CV: RRR     -edema Right leg no edema. No visible edema left leg. Asked to not examine due to pain  GI: soft, NT, Non distended  INTEGUMENT: no cyanosis  NEURO: Drowsy, having difficulty staying " awake, intermit muscle twitching    Labs:   All labs reviewed by me  Electrolytes/Renal - Recent Labs   Lab Test 07/17/23  1111 07/17/23  0841 07/17/23  0744 07/16/23  1215 07/16/23  1014 07/15/23  0752 07/15/23  0727 07/12/23  1209 07/12/23  0911 07/05/23  0823 07/05/23  0700 07/02/23  1754 07/02/23  1149   NA  --  144  --   --  142  --  145   < > 143   < > 142   < > 137   POTASSIUM  --  3.5  --   --  3.6  --  3.6   < > 4.1   < > 4.5   < > 4.3   CHLORIDE  --  108*  --   --  108*  --  112*   < > 112*   < > 108*   < > 107   CO2  --  23  --   --  22  --  20*   < > 19*   < > 16*   < > 21*   BUN  --  47.5*  --   --  46.6*  --  46.4*   < > 43.6*   < > 46.3*   < > 39.1*   CR  --  4.59*  --   --  4.59*  --  4.80*   < > 4.44*   < > 3.30*   < > 2.20*   * 129* 117*   < > 112*   < > 112*   < > 105*   < > 99   < > 128*   MAIKOL  --  8.4*  --   --  8.3*  --  8.3*   < > 8.3*   < > 7.7*   < > 7.9*   MAG  --   --   --   --   --   --   --   --  1.6*  --  1.7  --  1.7   PHOS  --  5.0*  --   --  4.9*  --  4.7*   < > 5.0*   < >  --   --   --     < > = values in this interval not displayed.       CBC -   Recent Labs   Lab Test 07/11/23  0656 07/10/23  1316 07/09/23  1145   WBC 8.5 9.0 11.8*   HGB 8.1* 8.2* 8.2*    269 299       LFTs -   Recent Labs   Lab Test 07/17/23  0841 07/16/23  1014 07/15/23  0727 07/06/23  1708 07/06/23  0713 07/04/23  0627 06/22/23  0711 06/20/23  0704   ALKPHOS  --   --   --   --  103 99  --  93   BILITOTAL  --   --   --   --  <0.2 0.2  --  0.2   ALT  --   --   --   --  12 14  --  14   AST  --   --   --   --  9 11  --  14   PROTTOTAL  --   --   --   --  5.3* 5.9*  --  5.9*   ALBUMIN 2.8* 2.6* 2.7*   < > 2.4*  2.4* 2.6*   < > 2.3*    < > = values in this interval not displayed.       Iron Panel -   Recent Labs   Lab Test 06/26/23  0513   IRON 30*   IRONSAT 21   FADI 2,616*         Imaging:      Current Medications:    amLODIPine  5 mg Oral Daily     apixaban ANTICOAGULANT  2.5 mg Oral BID      atorvastatin  40 mg Oral Daily     cefTAZidime  2 g Intravenous Q24H     ferrous sulfate  325 mg Oral Every Other Day     folic acid  1 mg Oral Daily     furosemide  60 mg Oral BID     heparin lock flush  5-20 mL Intracatheter Q24H     insulin aspart  1-7 Units Subcutaneous TID AC     insulin aspart  1-5 Units Subcutaneous At Bedtime     lactobacillus rhamnosus (GG)  1 capsule Oral BID     miconazole   Topical BID     multivitamin w/minerals  1 tablet Oral Daily     senna-docusate  1 tablet Oral BID     sodium chloride (PF)  10 mL Intracatheter Q8H     sodium chloride (PF)  10-40 mL Intracatheter Q8H     vancomycin place montilla - receiving intermittent dosing  1 each Intravenous See Admin Instructions       Tiffanie Maddox, NP

## 2023-07-18 NOTE — PROGRESS NOTES
[facility]  Palliative Care Daily Progress Note       Recommendations & Counseling       Appreciate psychiatry evaluation. Will include brother, Zeferino, in high stakes decision making, such as feeding tubes, dialysis, and code status.    Attempted to reach Ivan unsuccessfully today.  While dialysis is not noted as an urgent decision today there is risk that mental status will not improve and Ivan will be asked for decisional support.  WIll attempt some advance care planning.    Consider Health Psychology when available, in setting of noted adjustment disorder with depressed mood impacting participating in therapy at TCU, per notes he has expressed openness to engaging with Health Psychology but has not been open to antidepressant.     Consider re-consult WOC, with concerns of skin impairment on L heel.  Rahat has refused in past, but last attempt appears to have been on 6/20 and condition has evolved since then.     Add scheduled tylenol for generalized pain.        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 His post operative course has been complicated by deconditioning. He was discharged to TCU on 12/23/22 and was subsequently readmitted on 5/26/23 for left total hip arthroplasty and reimplantation and discharged 6/2. He most recently presented again on 6/17 with MRSA bacteremia. His present hospital course has been complicated by HALEY and declining function likely needing dialysis, encephalopathy.      Today, the patient was seen for:  Encephalopathy  HALEY  Bacteremia  Sepsis     Prognosis, Goals, or Advance Care Planning was addressed today with: No.    Mood,  coping, and/or meaning in the context of serious illness were addressed today: Yes.  Summary/Comments: .    Rahat laments on his long period of debility, refuses some cares, lab draw and L leg exam.  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              Interval History:     Chart review/discussion with unit or clinical team members:   Notes reviewed, ongoing kidney dysfunction, nephrology now notes no urgernt need for dialysis but concern for need in future.  Psychiatry evaluation, patient does not have decisional capacity today.     Per patient or family/caregivers today:  Attempted to call Ivan, no answer.  Do again is unable to give a history of the day.  He laments his long course of debility and today complains of generalized pain.  After some discussion he is able to pinpoint L heel and ankle as main source of pain.  He refuses exam, but noted serous drainage on sheet under left heel.  He did allow me to elevate L heel off bed with pillow.          Key Palliative Symptoms:  We are not helping to manage these symptoms currently in this patient.               Review of Systems:     Besides above, ROS was reviewed and is unremarkable          Medications:     I have reviewed this patient's medication profile and medications during this hospitalization.           Physical Exam:   Vitals were reviewed  Temp: 98.2  F (36.8  C) Temp src: Oral BP: (!) 153/73 Pulse: 86   Resp: 16 SpO2: 95 % O2 Device: None (Room air)      GEN:  Alert, oriented x 2, appears comfortable, NAD. Poor eye contact and slow to respond, generalized myoclonic jerking.  HEENT:  Normocephalic/atraumatic, no scleral icterus, no nasal discharge, mouth moist.  LUNGS:    Symmetric chest rise on inhalation noted. Non labored breathing.  SKIN:  Dry to touch, no exanthems noted in the visualized areas.                 Data Reviewed:     Reviewed recent pertinent imaging, comments:   none    Reviewed recent labs,  comments:   Sodium 147  Potassium 3.6  Creatinine 4.77  GFR 12        JOSEPH Calvin CNP  [facility], Palliative Care  Securely message with the [employer] Web Console (learn more here) or  Text page via [facility] Paging/Directory     TTS: I have personally spent a total of 40 minutes on the date of service reviewing patient's medical record, history, exam, documentation, consultation with the medical providers, assessing symptoms and providing emotional support. Time spend counseling with patient and attempted to reach family consisted of the following topics, goals of care and symptom management.  Time spent in coordination of care with Bedside Nurse , and Hospitalist ..

## 2023-07-18 NOTE — CONSULTS
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  Postop course complicated by profound deconditioning.  He was admitted to TCU initially on 12/23/2022 for physical rehabilitation, however therapies no longer worked with patient after several months of an in bed therapy and his refusals to mobilize.  Underwent biopsy on 4/7/2023 without evidence of infection.  He was transferred back to [facility] for left total hip arthroplasty and reimplantation on 5/26/23 with Dr. Myeers.  Presented again to [facility] for persistent MRSA bacteremia on 6/17.     Brief hosptial summary: Pt was admitted to continue infectious workup with MRSA bacteremia.  Admission blood cultures were positive, ID and Ortho were consulted. KEILA was obtained on 6/19, which was negative for endocarditis, ortho took for washout/debridement of hip on 6/21. ID recommending 6 weeks abx (6/21-8/1), patient recovering from HALEY, but given history of HALEY's and need for long term vanc plus comorbids making declining kidney function in the future probable nephrology doesn't feel comfortable with patient getting a PICC -- Tunneled internal jugular line placed. 7/4-7/9 course complicated by cefepime neurotoxicity limiting PO intake and participation in mobility.    Pt has been followed by nephrology and palliative during this admission. Treatment team  "is concerned for his cognitive capacity to understand his complex medical situation and effectively communicate his wishes. Today, he was agreeable to interview and reports he is doing \"okay.\" He has significant problems word finding and agrees that it is frustrating that he's having a hard time talking. He displayed increased speech latency and thought blocking. He was agreeable to having further cognitive testing and having his brother help with big decisions.         Current Medications:       amLODIPine  5 mg Oral Daily     apixaban ANTICOAGULANT  2.5 mg Oral BID     atorvastatin  40 mg Oral Daily     cefTAZidime  2 g Intravenous Q24H     ferrous sulfate  325 mg Oral Every Other Day     folic acid  1 mg Oral Daily     [Held by provider] furosemide  60 mg Oral BID     heparin lock flush  5-20 mL Intracatheter Q24H     insulin aspart  1-7 Units Subcutaneous TID AC     insulin aspart  1-5 Units Subcutaneous At Bedtime     lactobacillus rhamnosus (GG)  1 capsule Oral BID     miconazole   Topical BID     multivitamin w/minerals  1 tablet Oral Daily     senna-docusate  1 tablet Oral BID     sodium chloride (PF)  10 mL Intracatheter Q8H     sodium chloride (PF)  10-40 mL Intracatheter Q8H     vancomycin place montilla - receiving intermittent dosing  1 each Intravenous See Admin Instructions              MSE:   Appearance: awake, alert  Attitude:  cooperative  Eye Contact:  fair  Mood:  \"OK\"  Affect:  : mood congruent  Speech:  increased speech latency and paucity of speech  Psychomotor Behavior:  no evidence of tardive dyskinesia, dystonia, or tics  Muscle strength and tone: decreased   Thought Process:  evidence of thought blocking present  Associations:  no loose associations  Thought Content:  no evidence of suicidal ideation or homicidal ideation, no evidence of psychotic thought, no auditory hallucinations present and no visual hallucinations present  Insight:  limited  Judgement:  limited  Oriented to:  time, " "person, and place  Attention Span and Concentration:  limited  Recent and Remote Memory:  limited    Vital signs:  Temp: 98.2  F (36.8  C) Temp src: Oral BP: (!) 149/70 Pulse: 74   Resp: 16 SpO2: 98 % O2 Device: None (Room air) Oxygen Delivery: 2 LPM Height: 177.8 cm (5' 10\") Weight: 132.2 kg (291 lb 6.4 oz)  Estimated body mass index is 41.81 kg/m  as calculated from the following:    Height as of this encounter: 1.778 m (5' 10\").    Weight as of this encounter: 132.2 kg (291 lb 6.4 oz).             DSM-5 Diagnosis:   Unspecified neurocognitive disorder   Adjustment disorder with depressed mood           Assessment:   Rahat is a 72 year old male with multiple medical conditions complicated by cefepime neurotoxicity and delirium. His delirium has since resolved but his presentation of word finding difficulties, impaired memory and decreased executive function is consistent with his head scan showing chronic small vessel ischemic disease and moderate cerebral atrophy.     Aid to Capacity  1. Able to understand medical problem? No, he is unable to state why he is here for this admission. Unable to state current medical diagnoses  2. Able to understand proposed treatment? No, unable to remember or relay treatment recommendations despite them being discussed multiple times with multiple different providers   3. Able to understand alternative to proposed treatment (if any)?   No         4. Able to understand option of refusing proposed treatment?   Yes  5. Able to appreciate reasonably foreseeable consequences of accepting proposed treatment?  No  6. Able to appreciate reasonably foreseeable consequences of refusing proposed treatment?   No  7. The person s decision is affected by depression? No, his inability to understand is unrelated to adjustment disorder   8. The person s decision is affected by delusion/psychosis? No, no delusions or psychoses on assessment     Summary of capacity screener: At this time, Rahat does NOT " have the cognitive ability to make disposition or complex medical decisions for himself that align with his wishes and values. He has significant word finding difficulty not only when talking about medical topics. He is unable to remember talking to other treatment team members about the plan, unable to name his diagnoses or recommendations, let alone consequences of accepting or denying treatment.     He is agreeable to having OT come and administer SLUMs or MOCA.     He should have his brother, Zeferino, help make complex medical decisions for him. He states he believes his brother knows him well and is able to act in his best interest. He is amenable to having him involved in his care.         Safia Lozoya, PMFRANCESCAP-BC  Consult/Liaison Psychiatry   [facility]

## 2023-07-18 NOTE — PROGRESS NOTES
TCU SW received an email from [facility]kimberly/[facility].  [employer] sold the vehicles last week and will write out a check for [facility].     NEREYDA Sharp   [facility], Transitional Care Unit   Social Work   [address] [place] [address], [address]  [place], [place] [address] (ph) [phone]

## 2023-07-18 NOTE — PROGRESS NOTES
"CLINICAL NUTRITION SERVICES - REASSESSMENT NOTE     Nutrition Prescription    RECOMMENDATIONS FOR MDs/PROVIDERS TO ORDER:  Would benefit from WOC involvement. Last seen 6/20 when refused.   Pt may benefit from liberalizing potassium and only restricting protein and phosphorus. May also benefit from switching to renal multivitamin     Malnutrition Status:    Severe malnutrition in the context of acute on chronic illness    Recommendations already ordered by Registered Dietitian (RD):  Put pt on non-select meals    Future/Additional Recommendations:  Monitor labs, intakes, and weight trends.     EVALUATION OF THE PROGRESS TOWARD GOALS   Diet: Renal diet  Intake/[place]: 0 - 100% of documented meals. Ordering 0-1 meals per day  Snacks/supplements: Sending eggs, hurley and Bengali toast for breakfast    Pt ordering (on average) 877 kcal and 32 g protein per day per [employer]. Snacks/supplements add 666 kcal and 30 g protein daily. With documented intakes, is likely meeting ~ 50% minimum estimated energy and protein needs.      NEW FINDINGS/REVIEW OF SYSTEMS    General: Pt agreeing to WOC consult per MD note.      Nutrition/GI: RD met with pt in room. Pt with empty plate of breakfast in room. Pt did not recall eating it. When asked if he would like more food, pt replied that it all sounded good. Pt asking where the bathroom is. Pt seemed unaware of his inability to get out of bed. When reminded he was not able to get out of bed, pt replied \"well I should probably do something about that\" referring to going to the restroom. Pt behavior very different from baseline, as writer has followed pt since first admission in [facility].     Per discussion with RN, pt not ordering well and appetite is low. Also sometimes believes he has ordered when he has not. Discussed plan for nursing to help remind pt to order and help him to order the foods he likes. Yesterday pt had 3 meals ordered.     Weights: Pt with previous 81 lb (25%) " Family notified: @ 9783   weight loss in 6 months, now with 48 lb (20%) weight gain in last two months. Unsure of accuracy of last two weights.   Wt Readings from Last Encounters:   07/11/23 132.2 kg (291 lb 6.4 oz)   06/28/23 126 kg (277 lb 12.5 oz)   06/16/23 115.2 kg (254 lb)   05/26/23 110.4 kg (243 lb 4.8 oz)   05/19/23 110.4 kg (243 lb 4.8 oz)   03/23/23 117 kg (257 lb 14.4 oz)   01/09/23 119.7 kg (264 lb)   11/22/22 147.1 kg (324 lb 4.8 oz)   11/14/22 142.9 kg (315 lb)   11/09/22 146.8 kg (323 lb 9.6 oz)     Skin:  Likely pressure wounds, pt refusing turning and skin assessment.      Labs reviewed   07/16/23 10:14 07/17/23 08:41 07/18/23 07:00   Sodium 142 144 147 (H)   Potassium 3.6 3.5 3.6   Urea Nitrogen 46.6 (H) 47.5 (H) 49.2 (H)   Creatinine 4.59 (H) 4.59 (H) 4.77 (H)   Phosphorus 4.9 (H) 5.0 (H) 5.1 (H)   Albumin 2.6 (L) 2.8 (L) 2.8 (L)   CRP Inflammation  18.94 (H)    Glucose 112 (H) 129 (H) 120 (H)       07/17/23 02:26 07/17/23 07:44 07/17/23 11:11 07/17/23 18:37 07/17/23 22:51 07/18/23 02:23 07/18/23 06:10 07/18/23 08:08   Glucose 144 (H) 117 (H) 114 (H) 149 (H) 141 (H) 113 (H) 116 (H) 128 (H)     Medications reviewed: Ceftazidime, ferrous sulfate, folic acid, lasix, insulin, culturell, multivitamin, senna, vancomycin     MALNUTRITION  % Intake: </=75% for >/= 1 month (severe)  % Weight Loss: > 10% in 6 months (severe malnutrition) - previous  Subcutaneous Fat Loss: Facial region:  moderate and Upper arm:  moderate   Muscle Loss: Temporal:and Facial & jaw region:  severe  Fluid Accumulation/Edema: None noted  Malnutrition Diagnosis: Severe malnutrition in the context of chronic illness    Previous Goals   Patient to consume % of nutritionally adequate meal trays TID, or the equivalent with supplements/snacks.  Evaluation: Met    Previous Nutrition Diagnosis  Inadequate oral intake related to poor appetite, menu fatigue, altered mental status as evidenced by RN report and documented intakes.  Evaluation: No  change    CURRENT NUTRITION DIAGNOSIS  Inadequate oral intake related to poor appetite, menu fatigue, altered mental status as evidenced by pt and RN report and documented intakes.    INTERVENTIONS  Implementation  Nutrition education for nutrition relationship to health/disease   Modify composition of meals/snacks - put back on non-select meals    Goals  Patient to consume % of nutritionally adequate meal trays TID, or the equivalent with supplements/snacks.    Monitoring/Evaluation  Progress toward goals will be monitored and evaluated per protocol.    Dee Alanis MS, RDN, LD  RD pager: [phone]  [facility] Weekend/Holiday Pager: [phone]

## 2023-07-18 NOTE — PROGRESS NOTES
"  VS: BP (!) 149/70   Pulse 74   Temp 98.2  F (36.8  C) (Oral)   Resp 16   Ht 1.778 m (5' 10\")   Wt 132.2 kg (291 lb 6.4 oz)   SpO2 98%   BMI 41.81 kg/m     O2: 98% RA   Output: Voiding via primofit without issues   Last BM: 7/1, Chichi held d/t loose stools on that date   Activity: Pt requires an assist x2 for movement and positioning for cleaning   Skin: Skin shows improvements in coccyx, back upper thigh, and hip regions. Pt otherwise has scattered bruising around body.    Pain: Pt reported 4/10 pain in right hand early in shift. PRN tylenol given. On reassessment tylenol proved ineffective and PRN oxy 5mg given. Pt stated that helped a bit. Pts left foot also in consistent pain. When left calf is elevated on pillow he reports feeling \"a lot better\".    CMS: A&Ox3-4. Pt seems to have bouts of confusion, though per other staff reports he will sometimes stop responding if he is tired of speaking with whomever he is talking to.    Dressing: New mepilex applied to coccyx and bottom of left heel over pressure injury.    Diet: Renal dialysis diet   LDA: Right chest CVC infusing 5% dextrose   Equipment:    Plan: Continue POC   Additional Info:           "

## 2023-07-18 NOTE — PROGRESS NOTES
TCU SW received an email from nephkimberly/[facility].  [employer] sold the vehicles last week and will write out a check for [facility].     NEREYDA Sharp   [facility], Transitional Care Unit   Social Work   [address] [place] [address], [address]th Floor  [place], [place] [address] (ph) [phone]

## 2023-07-18 NOTE — PROGRESS NOTES
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  Postop course complicated by profound deconditioning.  He was admitted to TCU initially on 12/23/2022 for physical rehabilitation, however therapies no longer worked with patient after several months of an in bed therapy and his refusals to mobilize.  Underwent biopsy on 4/7/2023 without evidence of infection.  He was transferred back to [facility] for left total hip arthroplasty and reimplantation on 5/26/23 with Dr. Meyers.  Presented again to [place] for persistent MRSA bacteremia on 6/17. His course has been c/b HALEY. Baseline creat 1.0 as recent as 6/19/23).       Assessment & Recommendations:     1. HALEY - He appeared to be in recovery given gradual decline in creat however today creat up a bit in the setting of hypernatremia. I suspect he is dry.    - Creat today 4.7, from 4.5 ( GFR 13) yesterday   Creat was 4.8 on 7/13 with Vanc level of 28.2.   Creat hasn't dropped below 3 since 7/5/23  UO difficult to assess given incontinence   - Etiology for his HALEY : Vanco ( has had supratherapeutic levels in the upper 20's to 31), Sepsis, hemodynamics, diuresis   - Cystatin C w/GFR 7/17 is essentially the same ( 4.2/11 ml/mn GFR)   - BUN 49   - There has been some discussion regarding trial of HD for possible uremic symptoms. I don't think this is necessary at this time. However he may be faced with this decision if his renal function continues to decline, so getting a handle on his thoughts about long term dialysis would be helpful. Additionally, primary team has been working on establishing patient's decisional capacity given his seeming  current cognitive dysfunction.   OP dialysis would be challenging in patient's current state. There are few HD units with bed availability so he would require a recliner. He would need to transfer from his residence to the OP HD unit in a W/C because we have had issues with stretcher transport to the HD unit and who takes responsibility for transferring from the stretcher to the HD recliner. If a trial of HD was requested by the patient then I would recommend that he be inpatient for the trial period.   Patient's apathy appears to be chronic and not acutely reflective of uremia. No indication for HD today.    - Will give IVF to correct his hypernatremia. I have asked that he increase his fluid intake    - Continue to monitor for renal recovery with daily chemistry labs/strict I/O and daily weights    2 Volume status - He appears to be a bit dry with Na up to 147. I have asked the nurse to provide a pitcher of water at the bedside. No edema/dyspnea. Not on supplemental oxygen. Bp 149/70. Intake 120 ml, Output 375 ml+ ( incontinent). On Furosemide 60 mg bid   - Holding Furosemide today   - Please weigh daily. Zero bed    - Continue I/O    3. HTN - Borderline control in HALEY. Bp 149'/. No edema  Current regimen: Amlodipine 5 mg every day and Furosemide 60 mg bid   - Avoid hypotension. B/p ok at this time   - Holding Furosemide today    4. MRSA bacteremia- Will be completing 6 wks of Vancomycin on 8/1/23 and transitioning to Cipro ( dose will need to be adjusted to his renal function at the time of transition)    5. Electrolytes - Mild hypernatremia with Na 147, K 3.6    - Begin D5W at 50 ml/hr   - Na level q 8   - Increase fluid intake    6 Acid base - No acute concerns. Bicarb 23    7. BMD - Ca 8.5 Phos 5.1 albumin 2.8   - Malnutrition present: Getting MVI. Would benefit from dietary supplement   - No need for Phos binder at this time   - Vit D 16   - Begin D3 - 50 mcq every day   - Switch to Renal diet    8. Anemia -  "Hgb 8.1   - Etiology acute illness   - On Iron, folic acid   - Iron studies 6/26/23: Ferritin 2616, Fe 30 IS 21   - Does have DVT history on Eliquis   - Transfusions per primary team    Recommendations were communicated to primary team directly    Tiffanie Maddox, NP   Division of Renal Disease and Hypertension  [facility]  [employer] Web Console    Interval History :   Nursing and provider notes from last 24 hours reviewed.  Creat up a bit but his Na is up as well   Non oliguric/incontinent    Review of Systems:   I reviewed the following systems:  Patient is a questionable historian    Physical Exam:   I/O last 3 completed shifts:  In: 120 [I.V.:120]  Out: 275 [Urine:275]   BP (!) 149/70   Pulse 74   Temp 98.2  F (36.8  C) (Oral)   Resp 16   Ht 1.778 m (5' 10\")   Wt 132.2 kg (291 lb 6.4 oz)   SpO2 98%   BMI 41.81 kg/m       GENERAL APPEARANCE: More awake today. More engaged.  No family available at bedside  EYES:  no scleral icterus, pupils equal  PULM: lungs CTA. Not on supplemental oxygen   CV: RRR     -edema No edema  GI: soft, NT, Non distended  INTEGUMENT: no cyanosis  NEURO: Alert/responsive  His verbalizations are riddled with long pauses, difficult to follow responses    Labs:   All labs reviewed by me  Electrolytes/Renal -   Recent Labs   Lab Test 07/18/23  1212 07/18/23  0808 07/18/23  0700 07/17/23  1111 07/17/23  0841 07/16/23  1215 07/16/23  1014 07/12/23  1209 07/12/23  0911 07/05/23  0823 07/05/23  0700 07/02/23  1754 07/02/23  1149   NA  --   --  147*  --  144  --  142   < > 143   < > 142   < > 137   POTASSIUM  --   --  3.6  --  3.5  --  3.6   < > 4.1   < > 4.5   < > 4.3   CHLORIDE  --   --  110*  --  108*  --  108*   < > 112*   < > 108*   < > 107   CO2  --   --  23  --  23  --  22   < > 19*   < > 16*   < > 21*   BUN  --   --  49.2*  --  47.5*  --  46.6*   < > 43.6*   < > 46.3*   < > 39.1*   CR  --   --  4.77*  --  4.59*  --  4.59*   < > 4.44*   < > 3.30*   < > 2.20*   * 128* " 120*   < > 129*   < > 112*   < > 105*   < > 99   < > 128*   MAIKOL  --   --  8.5*  --  8.4*  --  8.3*   < > 8.3*   < > 7.7*   < > 7.9*   MAG  --   --   --   --   --   --   --   --  1.6*  --  1.7  --  1.7   PHOS  --   --  5.1*  --  5.0*  --  4.9*   < > 5.0*   < >  --   --   --     < > = values in this interval not displayed.       CBC -   Recent Labs   Lab Test 07/11/23  0656 07/10/23  1316 07/09/23  1145   WBC 8.5 9.0 11.8*   HGB 8.1* 8.2* 8.2*    269 299       LFTs -   Recent Labs   Lab Test 07/18/23  0700 07/17/23  0841 07/16/23  1014 07/06/23  1708 07/06/23  0713 07/04/23  0627 06/22/23  0711 06/20/23  0704   ALKPHOS  --   --   --   --  103 99  --  93   BILITOTAL  --   --   --   --  <0.2 0.2  --  0.2   ALT  --   --   --   --  12 14  --  14   AST  --   --   --   --  9 11  --  14   PROTTOTAL  --   --   --   --  5.3* 5.9*  --  5.9*   ALBUMIN 2.8* 2.8* 2.6*   < > 2.4*  2.4* 2.6*   < > 2.3*    < > = values in this interval not displayed.       Iron Panel -   Recent Labs   Lab Test 06/26/23  0513   IRON 30*   IRONSAT 21   FADI 2,616*         Imaging:      Current Medications:    amLODIPine  5 mg Oral Daily     apixaban ANTICOAGULANT  2.5 mg Oral BID     atorvastatin  40 mg Oral Daily     cefTAZidime  2 g Intravenous Q24H     ferrous sulfate  325 mg Oral Every Other Day     folic acid  1 mg Oral Daily     [Held by provider] furosemide  60 mg Oral BID     heparin lock flush  5-20 mL Intracatheter Q24H     insulin aspart  1-7 Units Subcutaneous TID AC     insulin aspart  1-5 Units Subcutaneous At Bedtime     lactobacillus rhamnosus (GG)  1 capsule Oral BID     miconazole   Topical BID     multivitamin w/minerals  1 tablet Oral Daily     senna-docusate  1 tablet Oral BID     sodium chloride (PF)  10 mL Intracatheter Q8H     sodium chloride (PF)  10-40 mL Intracatheter Q8H     vancomycin place montilla - receiving intermittent dosing  1 each Intravenous See Admin Instructions       D5W 50 mL/hr at 07/18/23 2074      Tiffanie Maddox, NP

## 2023-07-18 NOTE — PLAN OF CARE
"Goal Outcome Evaluation:         VS: Temp: 98.3  F (36.8  C) Temp src: Oral BP: (!) 149/56 Pulse: 80   Resp: 16 SpO2: 99 % O2 Device: None (Room air)     O2: stable on RA. LS clear and equal bilaterally. Denies chest pain and SOB.    Output:  using beside urinal but incontinent, Refused to turn or change    Last BM: 7/17/23 denies abdominal discomfort. BS active / passing flatus.    Activity: Up with lift, but bedrest   Skin: WDL except, left hip incision and general edema, pt on lasix   Pain: Pain managed with    CMS: Intact, AOx3-4 and confused. Denies numbness and tingling.   Dressing: C/D/I   Diet: Regular diet. Denies nausea/vomiting.   BG checks before meals and at bedtime. BG check at bedtime  BG overnight 114   LDA: CVC good blood return   Equipment: IV pole, personal belongings,    Plan: Fall, bed alarm on, MRSA  precautions maintained / Continue with plan of care. Call light within reach, pt able to make needs known.    Additional Info: WORKING ON discharge. Pt refused the whole shift to be turned or changed, he kept saying, \"not now. I will not do that.\" Teaching done on skin breakdown and the problem with that pt says, \" I know.\"                   "

## 2023-07-18 NOTE — PROGRESS NOTES
"[facility]  Palliative Care Daily Progress Note       Recommendations & Counseling       Recommend including brother, Zeferino, in high stakes decision making, such as feeding tubes, dialysis, and code status, Concern that Rahat continues to lack of various components (understanding and logic) of decisional capacity during my visit today.    It may be helpful to consult psychiatry to weigh in on decision making capacity.    Consider Health Psychology, in setting of noted adjustment disorder with depressed mood impacting participating in therapy at TCU, per notes he has expressed openness to engaging with Health Psychology but has not been open to antidepressant.           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 His post operative course has been complicated by deconditioning. He was discharged to TCU on 12/23/22 and was subsequently readmitted on 5/26/23 for left total hip arthroplasty and reimplantation and discharged 6/2. He most recently presented again on 6/17 with MRSA bacteremia. His present hospital course has been complicated by HALEY and declining function likely needing dialysis, encephalopathy.      Today, the patient was seen for:  Encephalopathy  HALEY  Bacteremia  Sepsis     Prognosis, Goals, or Advance Care Planning was addressed today with: Yes.    Mood, coping, and/or meaning in the context of serious illness were addressed today: Yes.  Summary/Comments: .    I asked how Rahat has gotten through hard times in the past and he states, he \"doesn't know\"  He \"just did.\"   He denies having a spiritual side, but states he is not an atheist.                Interval " History:     Chart review/discussion with unit or clinical team members:   Notes reviewed, ongoing kidney dysfunction, continued consideration for dialysis.    Per patient or family/caregivers today:  Seen today, in bed conversation is long and drawn out due to long pauses on Don's end, he requires redirection and prompting to continue but is able to answer most questions.   He is unable to explain his current circumstances. Although he tells me he thinks he is dying, he is unable to tell me any options related to dialysis presented by Dr. Rust (reportedly multiple times), only reflects upon his brothers experience with with dialysis and asks questions about how dialysis might go for him.  He appears to be struggling with considering dialysis for himself.  With his permission I offered to discuss what it might be like if he chose against dialysis, although I believe this has been discussed, he appeared surprised this was an option.    Don did express frustration with the medical system in general. Offered empathy.     Key Palliative Symptoms:  We are not helping to manage these symptoms currently in this patient.               Review of Systems:     Besides above, ROS was reviewed and is unremarkable          Medications:     I have reviewed this patient's medication profile and medications during this hospitalization.           Physical Exam:   Vitals were reviewed  Temp: 98.3  F (36.8  C) Temp src: Oral BP: (!) 149/56 Pulse: 80   Resp: 16 SpO2: 99 % O2 Device: None (Room air)      GEN:  Alert, oriented x 3, appears comfortable, NAD. Poor eye contact and slow to respond.  HEENT:  Normocephalic/atraumatic, no scleral icterus, no nasal discharge, mouth moist.  LUNGS:    Symmetric chest rise on inhalation noted. Non labored breathing.  SKIN:  Dry to touch, no exanthems noted in the visualized areas.                 Data Reviewed:     Reviewed recent pertinent imaging, comments:   *Head CT 7/4  Impression:   1. No  acute intracranial pathology.   2. Periventricular white matter hypodensities which are nonspecific,   but likely representing chronic small vessel ischemic disease.   3. Moderate cerebral atrophy.     I have personally reviewed the examination and initial interpretation   and I agree with the findings.     JOSE COMER MD     Reviewed recent labs, comments:   Sodium 144  Potassium 3.5  Creatinine 4.59  GFR 13        JOSEPH Calvin CNP  [facility], Palliative Care  Securely message with the [employer] Web Console (learn more here) or  Text page via [facility] Paging/Directory     115 minutes spent chart reviewing, coordinating care with medical team, discussing goals of care with patient and family, providing education regarding medical options, and documenting.

## 2023-07-18 NOTE — PROGRESS NOTES
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  Postop course complicated by profound deconditioning.  He was admitted to TCU initially on 12/23/2022 for physical rehabilitation, however therapies no longer worked with patient after several months of an in bed therapy and his refusals to mobilize.  Underwent biopsy on 4/7/2023 without evidence of infection.  He was transferred back to [facility] for left total hip arthroplasty and reimplantation on 5/26/23 with Dr. Meyers.  Presented again to [place] for persistent MRSA bacteremia on 6/17.     Brief Hospital Summary:    Pt was admitted to continue infectious workup with MRSA bacteremia.  Admission blood cultures were positive, ID and Ortho were consulted. KEILA was obtained on 6/19, which was negative for endocarditis, ortho took for washout/debridement of hip on 6/21. ID recommending 6 weeks abx (6/21-8/1), patient recovering from HALEY, but given history of HALEY's and need for long term vanc plus comorbids making declining kidney function in the future probable nephrology doesn't feel comfortable with patient getting a PICC -- Tunneled internal jugular line placed. 7/4-7/9 course complicated by cefepime neurotoxicity limiting PO intake and participation in mobility.    Changes Today:  - Psychiatry consult to assess capacity for decision regarding potentially starting on dialysis and dispositional capacity.  - Nephrology recommending against trial of dialysis at this time.  - D5 at 50ml/h today with sodium  147  - Hold lasix    This is a complex case given we have a gentleman who has had an overall decline in the last 6 months and in the preceding time at TCU was not participating in therapies/refusing many cares, suggesting he may want a more comfort focused approach, yet when faced with a conversation about goals of care, he seems to lack the capacity with make these decisions alone which may in part be due to uremia (which could potentially be better once on dialysis. However, even if he improves on dialysis and he can participate in the conversations, he is still incredibly deconditioned, and if he continues to refuse cares he will continue to deteroriate physically and will be prone to further complications of being bedbound.     AMS due to Delirium from illness/hospitalization and possible toxic metabolic encephalopathy, likely 2/2 cefepime neurotoxicity, improved initially but plateaued  Off cefepime since 7/4 with improvement several days later, but has now plateaued. CT head, ammonia, VBG wnl. Neurology consulted 7/6 and EEG without seizures. Completed 5 days of thiamine. Some consideration of uremia contributing, but overall only elevated to ~50.   - Delirium precautions  - Nephrology following  - Psychiatry consulted for capacity assessment.    MRSA bacteremia due to possible left hip septic arthritis vs other source Blood cultures: 6/14, 6/15 + for MRSA (4/4), and 6/16 (1/2 GPCs) and 6/17 (2/2) GPCs. - started vancomycin 6/15.   6/17 sinovial fluid cultures with staph and klebsiella. Purulent drainage noted at incision site at TCU, CT left hip concerning for infection (no mention of joint, mostly anterior in soft tissue) and couldn't exclude abscess/inf  - Continue vancomycin, added rocephin 6/21 > cefepime 6/23 > ceftazidime 7/4  - ID recommends 6 weeks IV vanc (last day 8/1/23) and can transition to oral cipro 750 mg BID at discharge, needs weekly vanc level, cbcd, cmp, crp while on IV abx, regular ECG  monitoring of QTc on high dose cipro --> follow up outpatient with ID prior to discontinuing antibiotics (needs appt end of July)  - blood cultures negative 6/19 and 6/20 (last + was 6/17)    Acute Kidney Injury - multifactorial, see below  Nephrology consulted, appreciate recs. Baseline creatinine 1.0 or 1.1. Etiology: suspect combo of sepsis/inflammation, hypovolemia, blood loss anemia following surgery, vancomycin. Overall kidney function has stabilized and may be starting to improve -- tried to broach whether or not he would even want dialysis but not sure he is fully understanding conversation and if he is, he is not able to express his wishes or he is avoiding expressing his wishes.   - Nephrology csonulted  - On 7/16, discussed with brother Ivan that we're approaching need for line placement and need for dialysis - could trial a time-limited course (2 weeks per renal) of HD to see if mental status improves enough to participate in further goals of care conversations    Bilateral LE edema due to volume overload from HALEY and iatrogenic from blood transfusions  Hyperesthesia of bilateral lower extremities (no signs of cellulitis as of 7/13)  - Resumed diuresis 7/12. Hold 7/18 with uptrending sodium and Cr.  - difficult to saw why legs feel painful to even light touch    Hypernatremia  Uptrending recently, 147 on 7/18. Likely volume down.  - Hold lasix  - D5W at 50ml/h today    Hypoxemic Respiratory Failure due to immobility, surgery - resolved  - continue to follow; encourage IS    Acute blood loss anemia following surgery, complicated by use of blood thinner  - 3 units day after surgery did joint washout (6/22) for acute blood loss anemia, 1 unit 6/25  - watch for bleeding; hold apixaban if need be - had unprovoked dvt in 2018 and would be on eliquis for life, high risk of recurrent DVT given recent procedures and illness/immobility  - iron and folic acid daily    S/p left hip explantation of left hip  antibiotic spacer and revision of left total hip arthroplasty (5/26/2023)  - ortho performed washout 6/21  -Activity: 50% PWB LLE with posterior hip precautions [id number]  -AFO ordered for foot drop but patient refusing  -PT/OT  -Knee to be kept even in a foam leg elevator to relax sciatic nerve  -Pain: holding robaxin 4 times a day with mental status changes (discontinuing), APAP PRN  -stop tramadol (7/11) due to renal function  -increase oxycodone 2.5-5 mg q6h PRN- taking very minimal amts so unlikely to be contributing to mental status    Hx of unprovoked DVT in 2018   - on eliquis 5 mg bid (dose reduced to 2.5 mg BID 7/10 with impaired renal function)     Type 2 diabetes mellitus  - Last hemoglobin A1c 5.6% on 6/16/2023.  On glipizide, metformin, Actos, and Victoza prior to hospitalizations and surgery.   - continue ISS    Constipation, improved  -Continue Colace 100 mg twice daily  -Continue senna 1 tablet twice daily-- had briefly increased to 2 tab twice daily due to lack of stool, now improved  -Continue MiraLAX daily     Hypomagnesemia  -monitor and replete      Severe malnutrition in the context of acute on chronic illness  Goal for patient is to consume % of meals TID. Goal is 7320-7357 kcals/daily. Protein needs- 100-126 grams/daily.   -RD consult and appreciate recs  -regular diet  -Weekly weights, daily I/O     Adjustment disorder with depressed mood  Prolonged grief disorder  Patient has been with depressed mood in setting of ongoing health issues, as evidenced by lack of motivation to work with therapies during both TCU admissions.  Past history of passive suicidal statements. Psychiatry saw patient during hospital admission on 5/31 due to lack of motivation with therapies. Patient declined interest in starting any psychiatric medications. Was willing to engage with health psychology.   -Outpatient health psychology consult  -Continue to monitor for willingness to start antidepressant  "therapy  -tried to discuss mood 7/15 but he didn't fully answer questions, seemed to talk about different aspect of his health that wasn't quite related to mood     HTN: stopping low dose IVF (7/11), starting diuresis 7/12, increased diuresis 7/13; started amlodipine 7/16    Stable and Resolved Issues:  Hyperlipidemia-continue PTA Lipitor 40 mg daily  JAIR-CPAP when sleeping         Diet: Regular Diet Adult  Snacks/Supplements Adult: Other; Please send scrambled eggs with cheddar cheese,2  hurley slices and Hungarian toast for breakfast.; With Meals    DVT Prophylaxis: DOAC  Tran Catheter: Not present  Lines: PRESENT      CVC Single Lumen Right Internal jugular Non - valved (open ended);Tunneled;Power injectable-Site Assessment: WDL      Cardiac Monitoring: None  Code Status: Full Code      Clinically Significant Risk Factors              # Hypoalbuminemia: Lowest albumin = 2.2 g/dL at 7/8/2023  8:51 AM, will monitor as appropriate    # Acute Kidney Injury, unspecified: based on a >150% or 0.3 mg/dL increase in last creatinine compared to past 90 day average, will monitor renal function         # Severe Obesity: Estimated body mass index is 41.81 kg/m  as calculated from the following:    Height as of this encounter: 1.778 m (5' 10\").    Weight as of this encounter: 132.2 kg (291 lb 6.4 oz).   # Severe Malnutrition: based on nutrition assessment         Disposition Plan        Jillian Alamo MD  Hospitalist Service, GOLD TEAM 21  [facility]  Securely message with [employer] (more info)  Text page via [facility] Paging/Directory   See signed in provider for up to date coverage information  ______________________________________________________________________    Interval History   No acute events overnight. Denies any new symptoms this morning. Feels bothered by intermittent muscle twitches.    Tried to discuss dialysis with pt again (has had multiple conversations with previous " providers). Pt did seem more agreeable to a short term trial of dialysis than long term, but still would not commit. Often would trail off part way through a thought and not finish it. Did note that his brother has been on dialysis and has been doing ok.    Physical Exam   Vital Signs: Temp: 98.3  F (36.8  C) Temp src: Oral BP: (!) 149/56 Pulse: 80   Resp: 16 SpO2: 99 % O2 Device: None (Room air)    Weight: 291 lbs 6.4 oz    General Appearance: NAD. Lying comfortably in bed.  Respiratory: CTAB. No increased WOB.  Cardiovascular: RRR. No m/r/g  GI: Soft. Non-tender. Non-distended.  Skin: No rash.  Other: AOx3. Moving all extremities. Normal affect. Intermittent myoclonic jerks.    Medical Decision Making       50 MINUTES SPENT BY ME on the date of service doing chart review, history, exam, documentation & further activities per the note.  MANAGEMENT DISCUSSED with the following over the past 24 hours: Nephrology       Data     I have personally reviewed the following data over the past 24 hrs:    N/A  \   N/A   / N/A     147 (H) 110 (H) 49.2 (H) /  128 (H)   3.6 23 4.77 (H) \       ALT: N/A AST: N/A AP: N/A TBILI: N/A   ALB: 2.8 (L) TOT PROTEIN: N/A LIPASE: N/A

## 2023-07-19 NOTE — PLAN OF CARE
Goal Outcome Evaluation:      Plan of Care Reviewed With: patient    Overall Patient Progress: improvingOverall Patient Progress: improving  Orientation:alert, slow in respond  Bowel:incontinent, last BM 7/18/23  Bladder: primofit connected to suction  Pain: denies  Ambulation/Transfers: Assist of 2/lift  Blood sugars:latest blood sugar 129mg/dl  Diet/ Liquids:renal diet  Tubes/ Lines/ Drains: R CVC with ongoing IV Fluid dextrose 5% at 50 ml /hour  Oxygen:room air  Skin: left hip incision  Additional info: Patient refused diaper change or repositioning this shift,   Bed alarm on for safety, call light within reach. Contact precaution maintained. Continue with POC.

## 2023-07-19 NOTE — CONSULTS
[facility] Nurse Inpatient Assessment     Consulted for: left heel    Attempted to see previously for buttocks. Pt flatly refused assessment for buttocks today. Allowed assessment of left heel after much negotiation.       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  Postop course complicated by profound deconditioning.  He was admitted to TCU initially on 12/23/2022 for physical rehabilitation, however therapies no longer worked with patient after several months of an in bed therapy and his refusals to mobilize.  Underwent biopsy on 4/7/2023 without evidence of infection.  He was transferred back to [facility] for left total hip arthroplasty and reimplantation on 5/26/23 with Dr. Meyers.  Presented again to [place] for persistent MRSA bacteremia on 6/17.     Assessment:      Areas visualized during today's visit: Lower extremities     Pressure Injury Location: Left heel    7/19/23  Last photo: 7/19  Wound type: Pressure Injury     Pressure Injury Stage: either Stage 2 or 3 deferring definitive staging until wound base has evolved, hospital acquired   Wound history/plan of care:  Pt known to [facility] service. Pt known to decline cares and repositioning.   7/19 initial assessment: Spent approx 20 mins on negotiating how to move leg to be able to minimally assess wound. Pt agreed to see picture of wound. This writer explained wound to pt using the photo. Discussed with patient if he would like legs elevated on pillows or boots. He agreed to pillows.    Wound base: 70 % dark fibrin vs slough, 30 % dermis around edges      Palpation of the wound bed: boggy      Drainage: small     Description of drainage: serosanguinous     Measurements (length x width x depth, in cm) 3 x 3 x 0.2 cm     Tunneling N/A     Undermining N/A  Periwound skin: Intact      Color: normal and consistent with surrounding tissue      Temperature: normal   Odor: none  Pain: moderate, shooting and stabbing  Pain intervention prior to dressing change: slow and gentle cares   Treatment goal: Infection control/prevention and soften nonviable tissue  STATUS: initial assessment  Supplies ordered: discussed with RN and discussed with patient       Treatment Plan:     Left heel wound(s): Every other day   Strongly encourage pt to elevate heels on pillows to float off bed surface at all times. (He agreed to this with WOC)  Cleanse wound with wound cleanser.   Apply light layer of Triad paste (#[id number]) to wound bed  Cut piece of adaptic to cover paste and wound  Secure with mepilex.    Orders: Written    RECOMMEND PRIMARY TEAM ORDER: None, at this time  Education provided: importance of repositioning, plan of care, wound progress and Infection prevention   Discussed plan of care with: Patient and Nurse  WOC nurse follow-up plan: twice weekly  Notify WOC if wound(s) deteriorate.  Nursing to notify the Provider(s) and re-consult the WOC Nurse if new skin concern.    DATA:     Current support surface: Standard  Standard gel/foam mattress (IsoFlex, Atmos air, etc)  Containment of urine/stool: Incontinence Protocol  BMI: Body mass index is 41.81 kg/m .   Active diet order: Orders Placed This Encounter      Renal Diet (non-dialysis)     Output: I/O last 3 completed shifts:  In: 100 [I.V.:100]  Out: 925 [Urine:925]     Labs: Recent Labs   Lab 07/19/23  0642   ALBUMIN 2.8*     Pressure injury risk assessment:   Sensory Perception: 3-->slightly limited  Moisture: 3-->occasionally moist  Activity: 2-->chairfast  Mobility: 2-->very limited  Nutrition: 2-->probably inadequate  Friction  and Shear: 2-->potential problem  Rafael Score: 14    Belén Le, RN   Pager no longer is use, please contact through [employer] group: [facility] Nurse [facility]  Dept. Office Number: *[phone]

## 2023-07-19 NOTE — PROGRESS NOTES
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  Postop course complicated by profound deconditioning.  He was admitted to TCU initially on 12/23/2022 for physical rehabilitation, however therapies no longer worked with patient after several months of an in bed therapy and his refusals to mobilize.  Underwent biopsy on 4/7/2023 without evidence of infection.  He was transferred back to [facility] for left total hip arthroplasty and reimplantation on 5/26/23 with Dr. Meyers.  Presented again to [place] for persistent MRSA bacteremia on 6/17. His course has been c/b HALEY. Baseline creat 1.0 as recent as 6/19/23).       Assessment & Recommendations:     1. HALEY - He appeared to be in recovery given gradual decline in creat however creat has been rising again over the last 2 days, albeit rather slowly.     - Creat today 4.9 from 4.7, 4.5 ( GFR 13) yesterday  Creat hasn't dropped below 3 since 7/5/23  UO difficult to assess given incontinence but 600 ml recorded this morning so far   - Etiology for his HALEY : Vanco ( has had supratherapeutic levels in the upper 20's to 31), Sepsis, hemodynamics, diuresis, AIN?   - Checking UA/CBC w/diff today. He remains on Abx   - Cystatin C w/GFR 7/17 is essentially the same ( 4.2/11 ml/mn GFR)   - BUN 49   - There has been some discussion regarding trial of HD for possible uremic symptoms. I don't think this is necessary at this time. However he may be faced with this decision if his renal function continues to decline, so getting a handle on his thoughts about long term dialysis would be helpful. Unfortunately, based upon Psych eval 7/18/23. Patient  does not have decisional capacity for complex decisions. Care conference being arranged to include brother Ivan   OP dialysis would be challenging in patient's current state. There are few HD units with bed availability so he would require a recliner. He would need to transfer from his residence to the OP HD unit in a W/C because we have had issues with stretcher transport to the HD unit and who takes responsibility for transferring from the stretcher to the HD recliner. He is currently requiring assist of 2/lift for transfers. If a trial of HD was requested by the patient then I would recommend that he be inpatient for the trial period.   Patient's apathy appears to be chronic and not acutely reflective of uremia. No indication for HD today.    - Continue to monitor for renal recovery with daily chemistry labs/strict I/O and daily weights    2 Volume status - No edema/dyspnea/hypoxia. Bp 124-149/70. Intake 100 ml, Output 600 ml+ ( incontinent). On Furosemide 60 mg bid   - Decrease Furosemide to 40 mg qd   - Please weigh daily. Zero bed    - Continue I/O    3. HTN - Borderline control in HALEY. Bp 120-149'/. No edema  Current regimen: Amlodipine 5 mg every day and Furosemide 60 mg bid   - Avoid hypotension. B/p ok at this time   - Decrease Furosemide    4. MRSA bacteremia- Will be completing 6 wks of Vancomycin on 8/1/23 and transitioning to Cipro ( dose will need to be adjusted to his renal function at the time of transition)    5. Electrolytes - No acute issues today. Na 140, K 3.2   - Given recent hypernatremia continue to encourage fluid intake   - Correct hypokalemia with KCL 40 meq today    6 Acid base - No acute concerns. Bicarb 24    7. BMD - Ca 8.5 Phos 4.9 albumin 2.8   - Malnutrition present: Getting MVI. Would benefit from dietary supplement   - No need for Phos binder at this time   - Vit D 16   - Began D3 - 50 mcq every day 7/18   - Switched to Renal diet    8. Anemia - Hgb 8.1   - Etiology acute  "illness   - On Iron, folic acid   - Iron studies 6/26/23: Ferritin 2616, Fe 30 IS 21   - Does have DVT history on Eliquis   - Transfusions per primary team    Recommendations were communicated to primary team via progress note    Tiffanie Maddox, NP   Division of Renal Disease and Hypertension  [facility]  [employer] [facility] Web Console    Interval History :   Nursing and provider notes from last 24 hours reviewed.  Creat up a bit   Na is corrected  Non oliguric/incontinent    Review of Systems:   I reviewed the following systems:  Patient is a questionable historian    Physical Exam:   I/O last 3 completed shifts:  In: 100 [I.V.:100]  Out: 775 [Urine:775]   BP (!) 148/57   Pulse 73   Temp 97.5  F (36.4  C) (Oral)   Resp 16   Ht 1.778 m (5' 10\")   Wt 132.2 kg (291 lb 6.4 oz)   SpO2 95%   BMI 41.81 kg/m       GENERAL APPEARANCE: Awake, more engaged.  No family available at bedside  EYES:  no scleral icterus, pupils equal  PULM: lungs CTA. Not on supplemental oxygen   CV: RRR     -edema No edema  GI: soft, NT, Non distended  INTEGUMENT: no cyanosis  NEURO: Alert/responsive  More interactive today but unable to repeat back our discussion    Labs:   All labs reviewed by me  Electrolytes/Renal -   Recent Labs   Lab Test 07/19/23  1215 07/19/23  0809 07/19/23  0642 07/19/23  0213 07/18/23  2215 07/18/23  0808 07/18/23  0700 07/17/23  1111 07/17/23  0841 07/12/23  1209 07/12/23  0911 07/05/23  0823 07/05/23  0700 07/02/23  1754 07/02/23  1149   NA  --   --  140  140  --  140  --  147*  --  144   < > 143   < > 142   < > 137   POTASSIUM  --   --  3.2*  3.2*  --  3.6  --  3.6  --  3.5   < > 4.1   < > 4.5   < > 4.3   CHLORIDE  --   --  104  104  --  105  --  110*  --  108*   < > 112*   < > 108*   < > 107   CO2  --   --  24  24  --  24  --  23  --  23   < > 19*   < > 16*   < > 21*   BUN  --   --  49.7*  --   --   --  49.2*  --  47.5*   < > 43.6*   < > 46.3*   < > 39.1*   CR  --   --  4.96*  --   --   --  4.77*  --  " 4.59*   < > 4.44*   < > 3.30*   < > 2.20*   * 128* 128*   < >  --    < > 120*   < > 129*   < > 105*   < > 99   < > 128*   MAIKOL  --   --  8.5*  --   --   --  8.5*  --  8.4*   < > 8.3*   < > 7.7*   < > 7.9*   MAG  --   --   --   --   --   --   --   --   --   --  1.6*  --  1.7  --  1.7   PHOS  --   --  4.9*  --   --   --  5.1*  --  5.0*   < > 5.0*   < >  --   --   --     < > = values in this interval not displayed.       CBC -   Recent Labs   Lab Test 07/11/23  0656 07/10/23  1316 07/09/23  1145   WBC 8.5 9.0 11.8*   HGB 8.1* 8.2* 8.2*    269 299       LFTs -   Recent Labs   Lab Test 07/19/23  0642 07/18/23  0700 07/17/23  0841 07/06/23  1708 07/06/23  0713 07/04/23  0627 06/22/23  0711 06/20/23  0704   ALKPHOS  --   --   --   --  103 99  --  93   BILITOTAL  --   --   --   --  <0.2 0.2  --  0.2   ALT  --   --   --   --  12 14  --  14   AST  --   --   --   --  9 11  --  14   PROTTOTAL  --   --   --   --  5.3* 5.9*  --  5.9*   ALBUMIN 2.8* 2.8* 2.8*   < > 2.4*  2.4* 2.6*   < > 2.3*    < > = values in this interval not displayed.       Iron Panel -   Recent Labs   Lab Test 06/26/23  0513   IRON 30*   IRONSAT 21   FADI 2,616*         Imaging:      Current Medications:    acetaminophen  975 mg Oral Q8H     amLODIPine  5 mg Oral Daily     apixaban ANTICOAGULANT  2.5 mg Oral BID     atorvastatin  40 mg Oral Daily     cefTAZidime  2 g Intravenous Q24H     ferrous sulfate  325 mg Oral Every Other Day     folic acid  1 mg Oral Daily     furosemide  60 mg Oral BID     heparin lock flush  5-20 mL Intracatheter Q24H     insulin aspart  1-7 Units Subcutaneous TID AC     insulin aspart  1-5 Units Subcutaneous At Bedtime     lactobacillus rhamnosus (GG)  1 capsule Oral BID     miconazole   Topical BID     multivitamin w/minerals  1 tablet Oral Daily     senna-docusate  1 tablet Oral BID     sodium chloride (PF)  10 mL Intracatheter Q8H     sodium chloride (PF)  10-40 mL Intracatheter Q8H     vancomycin place montilla -  receiving intermittent dosing  1 each Intravenous See Admin Instructions     cholecalciferol  50 mcg Oral Daily       Tiffanie Maddox, NP

## 2023-07-19 NOTE — PROGRESS NOTES
"[facility]  Palliative Care Daily Progress Note       Recommendations & Counseling       Appreciate psychiatry evaluation. Will include brother, Zeferino, in high stakes decision making, such as feeding tubes, dialysis, and code status.  He plans on coming in next on  between \"lunch and 3.\"  Discussed with hospitalist who requests FCC and will communicate with nephrology with plan to meet on .  Discussed Ivan's role as surrogate decision maker today.  He accepts this but feels uncertain about dialysis decision.  A time trial of a few weeks with goal of improved cognition resonates with him as he would prefer Don participate in discussion. We reviewed my previous conversations with Rahat, where he has brought up dying and a relative who recently , per Ivan after much suffering.  He also has brought up another brother who is on dialysis, stating it has been difficult for him.        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 His post operative course has been complicated by deconditioning. He was discharged to TCU on 22 and was subsequently readmitted on 23 for left total hip arthroplasty and reimplantation and discharged . He most recently presented again on  with MRSA bacteremia. His present hospital course has been complicated by HALEY and declining function likely needing dialysis, encephalopathy.      Today, the patient was seen for:  Encephalopathy  HALEY  Bacteremia  Sepsis     Prognosis, Goals, or Advance Care Planning was addressed today with: Yes.    Mood, coping, and/or meaning in the context of serious illness were addressed " today: No.  Summary/Comments: Discussed with Calin  above.          Interval History:     Chart review/discussion with unit or clinical team members:   Notes reviewed, ongoing kidney dysfunction, nephrology now notes no urgernt need for dialysis but concern for need in future.  Psychiatry evaluation, patient does not have decisional capacity on  .     Per patient or family/caregivers today:  Spoke with Calin via phone.  Reviewed above recommendation per nephrology and psychiatry. Discused Calin's role as HCA, and although it appears not urgent need for decisions today, it is likely Calin will be faced with decisions in near future.  He is open to discussion this week regarding goals of care to aid in decisions as they come.    I reviewed my previous conversations with Rahat including his statements about dying, family member who  recently and another brother who is on dialysis.     Key Palliative Symptoms:  We are not helping to manage these symptoms currently in this patient.               Review of Systems:     Besides above, ROS was reviewed and is unremarkable          Medications:     I have reviewed this patient's medication profile and medications during this hospitalization.           Physical Exam:   Vitals were reviewed  Temp: 97.6  F (36.4  C) Temp src: Oral BP: (!) 142/56 Pulse: 75   Resp: 16 SpO2: 95 % O2 Device: None (Room air)      GEN:  Alert, oriented x 2, appears comfortable, NAD. Poor eye contact and slow to respond, generalized myoclonic jerking.  HEENT:  Normocephalic/atraumatic, no scleral icterus, no nasal discharge, mouth moist.  LUNGS:    Symmetric chest rise on inhalation noted. Non labored breathing.  SKIN:  Dry to touch, no exanthems noted in the visualized areas.           Data Reviewed:     Reviewed recent pertinent imaging, comments:   none    Reviewed recent labs, comments:   Sodium 138  Potassium 3.4  Creatinine 4.96  GFR 12        Emperatriz Charles, APRN CNP  [facility],  Palliative Care  Securely message with the [employer] (learn more here) or  Text page via [facility] Paging/Directory     TTS: I have personally spent a total of 45 minutes on the date of service reviewing patient's medical record, history, exam, documentation, consultation with the medical providers, assessing symptoms and providing emotional support. Time spend counseling with family consisted of the following topics, goals of care, advance care planning, medical decision making regarding dialysis, education about diagnosis and education about prognosis.  Time spent in coordination of care with Hospitalist ..

## 2023-07-19 NOTE — PROGRESS NOTES
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  Postop course complicated by profound deconditioning.  He was admitted to TCU initially on 12/23/2022 for physical rehabilitation, however therapies no longer worked with patient after several months of an in bed therapy and his refusals to mobilize.  Underwent biopsy on 4/7/2023 without evidence of infection.  He was transferred back to [facility] for left total hip arthroplasty and reimplantation on 5/26/23 with Dr. Meyers.  Presented again to [place] for persistent MRSA bacteremia on 6/17.     Brief Hospital Summary:    Pt was admitted to continue infectious workup with MRSA bacteremia.  Admission blood cultures were positive, ID and Ortho were consulted. KEILA was obtained on 6/19, which was negative for endocarditis, ortho took for washout/debridement of hip on 6/21. ID recommending 6 weeks abx (6/21-8/1), patient recovering from HALEY, but given history of HALEY's and need for long term vanc plus comorbids making declining kidney function in the future probable nephrology doesn't feel comfortable with patient getting a PICC -- Tunneled internal jugular line placed. 7/4-7/9 course complicated by cefepime neurotoxicity limiting PO intake and participation in mobility.    Changes Today:  - WOCN consult for L heel wound, pt agreeable.  - Discussed health psychology consult with pt, he would like to defer for now.  - Psychiatry consulted 7/18 - pt not decisional regarding complex medical decisions or  disposition.  - Discussed with palliative - will plan for care conference with Ivan (brother) sometime this week when he is available. Unable to get in touch with him via phone yesterday or today.  - Sodium improved. Resumed PTA lasix.    This is a complex case given we have a gentleman who has had an overall decline in the last 6 months and in the preceding time at TCU was not participating in therapies/refusing many cares, suggesting he may want a more comfort focused approach, yet when faced with a conversation about goals of care, he seems to lack the capacity with make these decisions alone which may in part be due to uremia (which could potentially be better once on dialysis. However, even if he improves on dialysis and he can participate in the conversations, he is still incredibly deconditioned, and if he continues to refuse cares he will continue to deteroriate physically and will be prone to further complications of being bedbound.     AMS due to Delirium from illness/hospitalization and possible toxic metabolic encephalopathy, likely 2/2 cefepime neurotoxicity, improved initially but plateaued  Off cefepime since 7/4 with improvement several days later, but has now plateaued. CT head, ammonia, VBG wnl. Neurology consulted 7/6 and EEG without seizures. Completed 5 days of thiamine. Some consideration of uremia contributing, but overall only elevated to ~50.   - Delirium precautions  - Nephrology following  - Psychiatry consulted for capacity assessment.    MRSA bacteremia due to possible left hip septic arthritis vs other source Blood cultures: 6/14, 6/15 + for MRSA (4/4), and 6/16 (1/2 GPCs) and 6/17 (2/2) GPCs. - started vancomycin 6/15.   6/17 sinovial fluid cultures with staph and klebsiella. Purulent drainage noted at incision site at TCU, CT left hip concerning for infection (no mention of joint, mostly anterior in soft tissue) and couldn't exclude abscess/inf  - Continue vancomycin, added rocephin  6/21 > cefepime 6/23 > ceftazidime 7/4  - ID recommends 6 weeks IV vanc (last day 8/1/23) and can transition to oral cipro 750 mg BID at discharge, needs weekly vanc level, cbcd, cmp, crp while on IV abx, regular ECG monitoring of QTc on high dose cipro --> follow up outpatient with ID prior to discontinuing antibiotics (needs appt end of July)  - blood cultures negative 6/19 and 6/20 (last + was 6/17)    Acute Kidney Injury - multifactorial, see below  Nephrology consulted, appreciate recs. Baseline creatinine 1.0 or 1.1. Etiology: suspect combo of sepsis/inflammation, hypovolemia, blood loss anemia following surgery, vancomycin. Overall kidney function has stabilized and may be starting to improve -- tried to broach whether or not he would even want dialysis but not sure he is fully understanding conversation and if he is, he is not able to express his wishes or he is avoiding expressing his wishes.   - Nephrology consulted  - On 7/16, discussed with brother Ivan that we're approaching need for line placement and need for potentially dialysis. Had considered trial of dialysis last week to see if mental status improved. Pt has been inconsistent and non-committal about whether he would want dialysis (although he does seem more open to the idea of a short trial compared to long term dialysis). Assessed by psychiatry on 7/18 and determined that pt does not have decisional capacity regarding complex medical decisions including dialysis. No acute indication for dialysis, but Cr has started worsening again since 7/17 after plateauing. Will attempt to set up care conference with palliative, renal and Ivan to discuss dialysis and disposition.    Bilateral LE edema due to volume overload from HALEY and iatrogenic from blood transfusions  Hyperesthesia of bilateral lower extremities (no signs of cellulitis as of 7/13)  - Resumed diuresis 7/12. Held 7/18 with hypernatremia, restarted 7/19.    L heel pressure wound  - WOCN  consult    Hypernatremia - resolved  Uptrending recently, 147 on 7/18. Likely volume down. S/p D5W on 7/18, resolved.  - Monitor    Hypoxemic Respiratory Failure due to immobility, surgery - resolved  - continue to follow; encourage IS    Acute blood loss anemia following surgery, complicated by use of blood thinner  - 3 units day after surgery did joint washout (6/22) for acute blood loss anemia, 1 unit 6/25  - watch for bleeding; hold apixaban if need be - had unprovoked dvt in 2018 and would be on eliquis for life, high risk of recurrent DVT given recent procedures and illness/immobility  - iron and folic acid daily    S/p left hip explantation of left hip antibiotic spacer and revision of left total hip arthroplasty (5/26/2023)  - ortho performed washout 6/21  -Activity: 50% PWB LLE with posterior hip precautions [id number]  -AFO ordered for foot drop but patient refusing  -PT/OT  -Knee to be kept even in a foam leg elevator to relax sciatic nerve  -Pain: holding robaxin 4 times a day with mental status changes (discontinuing), APAP PRN  -stop tramadol (7/11) due to renal function  -increase oxycodone 2.5-5 mg q6h PRN- taking very minimal amts so unlikely to be contributing to mental status    Hx of unprovoked DVT in 2018   - on eliquis 5 mg bid (dose reduced to 2.5 mg BID 7/10 with impaired renal function)     Type 2 diabetes mellitus  - Last hemoglobin A1c 5.6% on 6/16/2023.  On glipizide, metformin, Actos, and Victoza prior to hospitalizations and surgery.   - continue ISS    Constipation, improved  -Continue Colace 100 mg twice daily  -Continue senna 1 tablet twice daily-- had briefly increased to 2 tab twice daily due to lack of stool, now improved  -Continue MiraLAX daily     Hypomagnesemia  -monitor and replete      Severe malnutrition in the context of acute on chronic illness  Goal for patient is to consume % of meals TID. Goal is 7296-5075 kcals/daily. Protein needs- 100-126 grams/daily.   -RD  consult and appreciate recs  -regular diet  -Weekly weights, daily I/O     Adjustment disorder with depressed mood  Prolonged grief disorder  Patient has been with depressed mood in setting of ongoing health issues, as evidenced by lack of motivation to work with therapies during both TCU admissions.  Past history of passive suicidal statements. Psychiatry saw patient during hospital admission on 5/31 due to lack of motivation with therapies. Patient declined interest in starting any psychiatric medications. Was willing to engage with health psychology.   -Outpatient health psychology consult  -Continue to monitor for willingness to start antidepressant therapy  -tried to discuss mood 7/15 but he didn't fully answer questions, seemed to talk about different aspect of his health that wasn't quite related to mood     HTN: stopping low dose IVF (7/11), starting diuresis 7/12, increased diuresis 7/13; started amlodipine 7/16    Stable and Resolved Issues:  Hyperlipidemia-continue PTA Lipitor 40 mg daily  JAIR-CPAP when sleeping           Diet: Snacks/Supplements Adult: Other; Please send scrambled eggs with cheddar cheese,2  hurley slices and french toast for breakfast.; With Meals  Renal Diet (non-dialysis)    DVT Prophylaxis: DOAC  Tran Catheter: Not present  Lines: PRESENT      CVC Single Lumen Right Internal jugular Non - valved (open ended);Tunneled;Power injectable-Site Assessment: WDL      Cardiac Monitoring: None  Code Status: Full Code      Clinically Significant Risk Factors        # Hypokalemia: Lowest K = 3.2 mmol/L in last 2 days, will replace as needed  # Hypernatremia: Highest Na = 147 mmol/L in last 2 days, will monitor as appropriate      # Hypoalbuminemia: Lowest albumin = 2.2 g/dL at 7/8/2023  8:51 AM, will monitor as appropriate    # Acute Kidney Injury, unspecified: based on a >150% or 0.3 mg/dL increase in last creatinine compared to past 90 day average, will monitor renal function         # Severe  "Obesity: Estimated body mass index is 41.81 kg/m  as calculated from the following:    Height as of this encounter: 1.778 m (5' 10\").    Weight as of this encounter: 132.2 kg (291 lb 6.4 oz).   # Severe Malnutrition: based on nutrition assessment         Disposition Plan           Jillian Alamo MD  Hospitalist Service, GOLD TEAM 21  [facility]  Securely message with [employer] (more info)  Text page via [employer] Paging/Directory   See signed in provider for up to date coverage information  ______________________________________________________________________    Interval History   No acute events overnight. Denies any new symptoms this morning. Had a lot of pain when attempted to look at L heel, but pt states he is open to having wound nurses look at it.     Physical Exam   Vital Signs: Temp: 97.5  F (36.4  C) Temp src: Oral BP: (!) 148/57 Pulse: 73   Resp: 16 SpO2: 95 % O2 Device: None (Room air)    Weight: 291 lbs 6.4 oz    General Appearance: NAD. Lying comfortably in bed.  Respiratory: CTAB. No increased WOB.  Cardiovascular: RRR. No m/r/g  GI: Soft. Non-tender. Non-distended.  Skin: No rash.  Other: AOx3. Moving all extremities. Responses often delayed or pt will trail off half-way through a thought.    Medical Decision Making       50 MINUTES SPENT BY ME on the date of service doing chart review, history, exam, documentation & further activities per the note.  MANAGEMENT DISCUSSED with the following over the past 24 hours: Psych, Palliative, Renal       Data     I have personally reviewed the following data over the past 24 hrs:    N/A  \   N/A   / N/A     140; 140 104; 104 49.7 (H) /  128 (H)   3.2 (L); 3.2 (L) 24; 24 4.96 (H) \       ALT: N/A AST: N/A AP: N/A TBILI: N/A   ALB: 2.8 (L) TOT PROTEIN: N/A LIPASE: N/A       "

## 2023-07-19 NOTE — PLAN OF CARE
Goal Outcome Evaluation:      Plan of Care Reviewed With: patient    Overall Patient Progress: declining    Outcome Evaluation: Pt with poor memory/ability to order meals or rememebr meals he did or did not order or eat. Thus, without RN help, pt not ordering meals and not eating. Plan to send meals TID if pt does not order meals.

## 2023-07-20 NOTE — PLAN OF CARE
OT: orders received for SLUMS. Per chart review and following discussion with psych, patient does not have capacity to make decisions due to current cognitive status. Pt is having difficulty with word finding and significantly increased time for thought processing and responses required. Will defer on SLUMS at this point as cognitive issues apparent and paper test would likely be very challenging for pt at this time. Would recommend pt have assist/supervision with IADLs such as finances, driving, medication mgmt. Recommend outpatient neuropsych testing when pt able. Will complete orders, but if status changes, open to re-consult as needed.

## 2023-07-20 NOTE — PLAN OF CARE
Goal Outcome Evaluation:      Plan of Care Reviewed With: patient    Overall Patient Progress: no changeOverall Patient Progress: no change    Orientation: alert, intermittent confusion  Bowel: last BM 7/18/23, Refused PRN miralax  Bladder: voided 2x this shift, uses urinal  Pain: 6/10 left hip  Ambulation/Transfers: assist of 2/ lift  Blood sugars: 130 mg/dl at 2am  Diet/ Liquids: renal diet, ate 100% of meal  Tubes/ Lines/ Drains: R CVC heparin locked  Oxygen: room air  Skin: refused repositioning  Bed alarm on for safety, call light within reach. Continue with POC.

## 2023-07-20 NOTE — PLAN OF CARE
[id number]   A& O X 1, X 2 intermittently  VSS, sating adequately on RA  Not OOB during this shift  Diabetic. BG check. No insuline  Coverage needed at this time.  Incontinent of B/B, briefs on. Turn   And reposition Q 2 hrs.   Pt on ABX for Infection  ID and medicine following  Does not hold decisional  Capacity per notes.  Will continue with POC

## 2023-07-20 NOTE — CARE PLAN
"VS: BP (!) 142/56 (BP Location: Left arm)   Pulse 75   Temp 97.6  F (36.4  C) (Oral)   Resp 16   Ht 1.778 m (5' 10\")   Wt 132.2 kg (291 lb 6.4 oz)   SpO2 95%   BMI 41.81 kg/m          O2: RA, denies SOB and CP.   Output: Incontinent of bowel & bladder. Briefs changed this shift.    Last BM: 07/16/2023   Activity: Ax3 for cares.    Skin: LLE surgical incision, blanchable redness to caridad area and buttocks.   Pain: Managed with Tylenol.   Neuro: A & O x3, pt is intermittently disoriented to time. Intermittently confused. Denies numbness, tingling present.   Dressing: CDI   Diet: Regular diet. BG checks. No novolog given this shift. Dextrose running.   LDA: R CVC    Equipment: Personal belongings, call light, bedpan, IV pump/pole, pulsate mattress.   Plan: Continue with plan of care, waiting on placement.   Additional Info: Patient was getting confused. He needs close follow up and help in ordering meals and eating.       "

## 2023-07-20 NOTE — PROGRESS NOTES
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  Postop course complicated by profound deconditioning.  He was admitted to TCU initially on 12/23/2022 for physical rehabilitation, however therapies no longer worked with patient after several months of an in bed therapy and his refusals to mobilize.  Underwent biopsy on 4/7/2023 without evidence of infection.  He was transferred back to [facility] for left total hip arthroplasty and reimplantation on 5/26/23 with Dr. Meyers.  Presented again to [place] for persistent MRSA bacteremia on 6/17.     Brief Hospital Summary:    Pt was admitted to continue infectious workup with MRSA bacteremia.  Admission blood cultures were positive, ID and Ortho were consulted. KEILA was obtained on 6/19, which was negative for endocarditis, ortho took for washout/debridement of hip on 6/21. ID recommending 6 weeks abx (6/21-8/1), patient recovering from HALEY, but given history of HALEY's and need for long term vanc plus comorbids making declining kidney function in the future probable nephrology doesn't feel comfortable with patient getting a PICC -- Tunneled internal jugular line placed. 7/4-7/9 course complicated by cefepime neurotoxicity limiting PO intake and participation in mobility.    This is a complex case given we have a gentleman who has had an overall decline in the last 6 months and in the preceding time at TCU was not participating in therapies/refusing many cares, suggesting he may want a more comfort focused  approach, yet when faced with a conversation about goals of care, he seems to lack the capacity with make these decisions alone which may in part be due to uremia (which could potentially be better once on dialysis). However, even if he improves on dialysis and he can participate in the conversations, he is still incredibly deconditioned, and if he continues to refuse cares he will continue to deteroriate physically and will be prone to further complications of being bedbound.     Changes Today:  - Plan for care conference tomorrow (7/21) early afternoon with Ivan.   - Cr continues to slowly worsen. No acute indication for dialysis, but does appear to be heading in that direction. D/w nephrology and will stop lasix.  - Psychiatry consulted 7/18 - pt not decisional regarding complex medical decisions or disposition.    AMS due to Delirium from illness/hospitalization and possible toxic metabolic encephalopathy, likely 2/2 cefepime neurotoxicity, improved initially but plateaued  Off cefepime since 7/4 with improvement several days later, but has now plateaued. CT head, ammonia, VBG wnl. Neurology consulted 7/6 and EEG without seizures. Completed 5 days of thiamine. Some consideration of uremia contributing, but overall only elevated to ~50.   - Delirium precautions  - Nephrology following  - Psychiatry consulted for capacity assessment.    MRSA bacteremia due to possible left hip septic arthritis vs other source Blood cultures: 6/14, 6/15 + for MRSA (4/4), and 6/16 (1/2 GPCs) and 6/17 (2/2) GPCs. - started vancomycin 6/15.   6/17 sinovial fluid cultures with staph and klebsiella. Purulent drainage noted at incision site at TCU, CT left hip concerning for infection (no mention of joint, mostly anterior in soft tissue) and couldn't exclude abscess/inf  - Continue vancomycin, added rocephin 6/21 > cefepime 6/23 > ceftazidime 7/4  - ID recommends 6 weeks IV vanc (last day 8/1/23) and can transition to oral cipro 750 mg  BID at discharge, needs weekly vanc level, cbcd, cmp, crp while on IV abx, regular ECG monitoring of QTc on high dose cipro --> follow up outpatient with ID prior to discontinuing antibiotics (needs appt end of July)  - blood cultures negative 6/19 and 6/20 (last + was 6/17)    Acute Kidney Injury - multifactorial, see below  Nephrology consulted, appreciate recs. Baseline creatinine 1.0 or 1.1. Etiology: suspect combo of sepsis/inflammation, hypovolemia, blood loss anemia following surgery, vancomycin. Overall kidney function has stabilized and may be starting to improve -- tried to broach whether or not he would even want dialysis but not sure he is fully understanding conversation and if he is, he is not able to express his wishes or he is avoiding expressing his wishes.   - Nephrology consulted  - On 7/16, discussed with brother Ivan that we're approaching need for line placement and need for potentially dialysis. Had considered trial of dialysis last week to see if mental status improved. Pt has been inconsistent and non-committal about whether he would want dialysis (although he does seem more open to the idea of a short trial compared to long term dialysis). Assessed by psychiatry on 7/18 and determined that pt does not have decisional capacity regarding complex medical decisions including dialysis. No acute indication for dialysis, but Cr has started worsening again since 7/17 after plateauing. Will attempt to set up care conference with palliative, renal and Ivan to discuss dialysis and disposition.    Bilateral LE edema due to volume overload from HALEY and iatrogenic from blood transfusions  Hyperesthesia of bilateral lower extremities (no signs of cellulitis as of 7/13)  - Holding diuresis starting 7/20    L heel pressure wound  Noted earlier in pts hospitalization (~6/20), but at that time he refused assessment. Noted again by nursing to be worsening on 7/19 and WOCN consult was placed. Pt was agreeable  and had wound assessed. He has been mostly non-agreeable to offloading or other preventative measures recommended by RN and WOCN, but will sometimes allow it with significant encouragement.  - WOCN consult  - Continue to encourage pt to utilize offloading techniques and to participate in wound cares.    Hypernatremia - resolved  Uptrending recently, 147 on 7/18. Likely volume down. S/p D5W on 7/18, resolved.  - Monitor. Holding diuretics.    Hypoxemic Respiratory Failure due to immobility, surgery - resolved  - continue to follow; encourage IS    Acute blood loss anemia following surgery, complicated by use of blood thinner  - 3 units day after surgery did joint washout (6/22) for acute blood loss anemia, 1 unit 6/25  - watch for bleeding; hold apixaban if need be - had unprovoked dvt in 2018 and would be on eliquis for life, high risk of recurrent DVT given recent procedures and illness/immobility  - iron and folic acid daily    S/p left hip explantation of left hip antibiotic spacer and revision of left total hip arthroplasty (5/26/2023)  - ortho performed washout 6/21  -Activity: 50% PWB LLE with posterior hip precautions [id number]  -AFO ordered for foot drop but patient refusing  -PT/OT  -Knee to be kept even in a foam leg elevator to relax sciatic nerve  -Pain: holding robaxin 4 times a day with mental status changes (discontinuing), APAP PRN  -stop tramadol (7/11) due to renal function  -increase oxycodone 2.5-5 mg q6h PRN- taking very minimal amts so unlikely to be contributing to mental status    Hx of unprovoked DVT in 2018   - on eliquis 5 mg bid (dose reduced to 2.5 mg BID 7/10 with impaired renal function)     Type 2 diabetes mellitus  - Last hemoglobin A1c 5.6% on 6/16/2023.  On glipizide, metformin, Actos, and Victoza prior to hospitalizations and surgery.   - continue ISS    Constipation, improved  -Continue Colace 100 mg twice daily  -Continue senna 1 tablet twice daily-- had briefly increased to 2  tab twice daily due to lack of stool, now improved  -Continue MiraLAX daily     Hypomagnesemia  -monitor and replete      Severe malnutrition in the context of acute on chronic illness  Goal for patient is to consume % of meals TID. Goal is 1901-6712 kcals/daily. Protein needs- 100-126 grams/daily.   -RD consult and appreciate recs  -regular diet  -Weekly weights, daily I/O     Adjustment disorder with depressed mood  Prolonged grief disorder  Patient has been with depressed mood in setting of ongoing health issues, as evidenced by lack of motivation to work with therapies during both TCU admissions.  Past history of passive suicidal statements. Psychiatry saw patient during hospital admission on 5/31 due to lack of motivation with therapies. Patient declined interest in starting any psychiatric medications. Was willing to engage with health psychology.   -Outpatient health psychology consult  -Continue to monitor for willingness to start antidepressant therapy  -tried to discuss mood 7/15 but he didn't fully answer questions, seemed to talk about different aspect of his health that wasn't quite related to mood     HTN:   - Started amlodipine 7/16    Stable and Resolved Issues:  Hyperlipidemia-continue PTA Lipitor 40 mg daily  JAIR-CPAP when sleeping       Diet: Snacks/Supplements Adult: Other; Please send scrambled eggs with cheddar cheese,2  hurley slices and french toast for breakfast.; With Meals  Renal Diet (non-dialysis)  Room Service    DVT Prophylaxis: DOAC  Tran Catheter: Not present  Lines: PRESENT      CVC Single Lumen Right Internal jugular Non - valved (open ended);Tunneled;Power injectable-Site Assessment: WDL      Cardiac Monitoring: None  Code Status: Full Code      Clinically Significant Risk Factors        # Hypokalemia: Lowest K = 3.2 mmol/L in last 2 days, will replace as needed       # Hypoalbuminemia: Lowest albumin = 2.2 g/dL at 7/8/2023  8:51 AM, will monitor as appropriate    # Acute  "Kidney Injury, unspecified: based on a >150% or 0.3 mg/dL increase in last creatinine compared to past 90 day average, will monitor renal function         # Severe Obesity: Estimated body mass index is 41.81 kg/m  as calculated from the following:    Height as of this encounter: 1.778 m (5' 10\").    Weight as of this encounter: 132.2 kg (291 lb 6.4 oz).   # Severe Malnutrition: based on nutrition assessment         Disposition Plan           Jillian Alamo MD  Hospitalist Service, GOLD TEAM 21  [facility]  Securely message with [employer] (more info)  Text page via [facility] Paging/Directory   See signed in provider for up to date coverage information  ______________________________________________________________________    Interval History   No acute events overnight. Eating breakfast. Encouraged pt to utilize offloading for L heel wound.     Physical Exam   Vital Signs: Temp: 97.6  F (36.4  C) Temp src: Oral BP: (!) 142/56 Pulse: 75   Resp: 16 SpO2: 95 % O2 Device: None (Room air)    Weight: 291 lbs 6.4 oz    General Appearance: NAD. Lying comfortably in bed.  Respiratory: CTAB. No increased WOB.  Cardiovascular: RRR. No m/r/g  GI: Soft. Non-tender. Non-distended.  Skin: L heel ulcer, dressed.  Other: Moving all extremities. Flat affect. Delayed responses.    Medical Decision Making       50 MINUTES SPENT BY ME on the date of service doing chart review, history, exam, documentation & further activities per the note.  MANAGEMENT DISCUSSED with the following over the past 24 hours: Renal, Palliative       Data     I have personally reviewed the following data over the past 24 hrs:    8.4  \   8.0 (L)   / 251     141; 141 103; 103 51.9 (H) /  106 (H)   4.0; 4.0 24; 24 5.15 (H) \       ALT: N/A AST: N/A AP: N/A TBILI: N/A   ALB: 2.7 (L) TOT PROTEIN: N/A LIPASE: N/A       "

## 2023-07-20 NOTE — PROGRESS NOTES
"VS: BP (!) 142/59 (BP Location: Left arm)   Pulse 71   Temp 97.5  F (36.4  C) (Oral)   Resp 16   Ht 1.778 m (5' 10\")   Wt 132.2 kg (291 lb 6.4 oz)   SpO2 93%   BMI 41.81 kg/m             O2: RA, denies SOB and CP.   Output: Incontinent of bowel & bladder. Briefs and bedding changed this shift.    Last BM: 07/20/2023   Activity: Ax3 for cares.    Skin: Left hip surgical incision CDI, dsg changed, blanchable redness to caridad area and buttocks, new sacral mepilex applied. Left heel unstageable and reportable per WOC, heel bandage changed after assessment.   Pain: Managed with Tylenol.   Neuro: A & O x3, pt is intermittently disoriented to time. Intermittently confused. Denies numbness, tingling present.   Dressing: CDI   Diet: Regular diet. BG checks.    LDA: R CVC, flushed   Equipment: Personal belongings, call light, bedpan, IV pump/pole, pulsate mattress.   Plan: Continue with plan of care, waiting on placement.   Additional Info: Patient confused at times. He needs close follow up and help in ordering meals and eating. Pallitive consult with brother Ivan 7/21/23 after 1200       "

## 2023-07-20 NOTE — PROGRESS NOTES
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  Postop course complicated by profound deconditioning.  He was admitted to TCU initially on 12/23/2022 for physical rehabilitation, however therapies no longer worked with patient after several months of an in bed therapy and his refusals to mobilize.  Underwent biopsy on 4/7/2023 without evidence of infection.  He was transferred back to [facility] for left total hip arthroplasty and reimplantation on 5/26/23 with Dr. Meyers.  Presented again to [place] for persistent MRSA bacteremia on 6/17. His course has been c/b HALEY. Baseline creat 1.0 as recent as 6/19/23).       Assessment & Recommendations:     1. HALEY - He appeared to be in recovery given gradual decline in creat however creat has been rising again over the last 3 days, albeit rather slowly.     - Creat today 5.1, from 4.9 4.7, 4.5 ( GFR 13) on 7/17  Creat hasn't dropped below 3 since 7/5/23  UO difficult to assess given incontinence but he has been able to use a urinal and 1050 ml recorded.    - Etiology for his HALEY : Vanco ( has had supratherapeutic levels in the upper 20's to 31), Sepsis, hemodynamics, diuresis   - UA today showing granular casts, pro 70 and small LE. CBC w/diff neg for EOS. He remains on Abx   - Cystatin C w/GFR 7/17 is essentially the same ( 4.2/11 ml/mn GFR)   - BUN 51   - There had been some discussion regarding trial of HD for possible uremic symptoms. I don't think this is necessary at this time. However he may be faced with this decision as his renal function continues to decline, so getting a handle on his thoughts about long term dialysis would be  helpful. Unfortunately, based upon Psych eval 7/18/23. Patient does not have decisional capacity for complex decisions. Care conference being arranged to include brother Ivan tomorrow.   OP dialysis would be challenging in patient's current state. There are few HD units with bed availability so he would need to tolerate sitting in a recliner for 3-4 hrs. He would need to transfer from his residence to the OP HD unit in a W/C because we have had issues with stretcher transport to the HD unit and who takes responsibility for transferring from the stretcher to the HD recliner. He is currently requiring assist of 2/lift for transfers which will also likely be a barrier to OP HD. He is not even sitting in the recliner in his room.   If a trial of HD was requested by the patient then I would recommend that SW confirm an OP spot so that if he improved during the trial period and wanted to continue we would be able to actually offer an OP spot.      Patient's apathy appears to be chronic and not acutely reflective of uremia. Hopefully brothamelie Love can give us insight into Don's baseline cognition    No acute indication for HD today.    - Continue to monitor for renal recovery with daily chemistry labs/strict I/O and daily weights    2 Volume status - No edema/dyspnea/hypoxia. Bp 140'/. Intake 100 ml, Output 1050 ml ( using a urinal but also incontinent). On Furosemide 40 mg qd   - Hold Furosemide for now   - Please weigh daily. Zero bed    - Continue I/O    3. HTN - Borderline control in HALEY. Bp 140's/. No edema  Current regimen: Amlodipine 5 mg every day and Furosemide 40 mg qd   - Avoid hypotension. B/p ok at this time   - Hold Furosemide    4. MRSA bacteremia- Will be completing 6 wks of Vancomycin on 8/1/23 and transitioning to Cipro ( dose will need to be adjusted to his renal function at the time of transition)    5. Electrolytes - No acute issues today. Na 141, K 4.0   - Given recent hypernatremia continue to  "encourage fluid intake    6 Acid base - No acute concerns. Bicarb 24    7. BMD - Ca 8.4 Phos 5.6 albumin 2.7   - Malnutrition present: Getting MVI. Would benefit from dietary supplement   - No need for Phos binder at this time   - Vit D 16   - Began D3 - 50 mcq every day 7/18   - Switched to Renal diet 7/19    8. Anemia - Hgb 8.0   - Etiology acute illness   - On Iron, folic acid   - Iron studies 6/26/23: Ferritin 2616, Fe 30 IS 21   - Does have DVT history on Eliquis   - Ferritin too high for IV iron   - Transfusions per primary team    Recommendations were communicated to primary team directly    Tiffanie Maddox NP   Division of Renal Disease and Hypertension  [facility]  BroadcastrEn[facility] of MontgomeryPlayedmaryjo Web Console    Interval History :   Nursing and provider notes from last 24 hours reviewed.  Creat continues to slowly rise  Non oliguric/incontinent    Review of Systems:   I reviewed the following systems:  Patient is a questionable historian    Physical Exam:   I/O last 3 completed shifts:  In: 100 [I.V.:100]  Out: 700 [Urine:700]   BP (!) 142/59 (BP Location: Left arm)   Pulse 71   Temp 97.5  F (36.4  C) (Oral)   Resp 16   Ht 1.778 m (5' 10\")   Wt 132.2 kg (291 lb 6.4 oz)   SpO2 93%   BMI 41.81 kg/m       GENERAL APPEARANCE: Awake but lethargic today. Picking at his breakfast in a supine position. No family available at bedside  EYES:  no scleral icterus, pupils equal  PULM: lungs CTA. Not on supplemental oxygen   CV: RRR     -edema No edema  GI: soft, NT, Non distended  INTEGUMENT: no cyanosis  NEURO: more lethargic today. Still can't remember my name     Labs:   All labs reviewed by me  Electrolytes/Renal -   Recent Labs   Lab Test 07/20/23  0810 07/20/23  0538 07/20/23  0206 07/19/23  2206 07/19/23  1711 07/19/23  1512 07/19/23  0809 07/19/23  0642 07/18/23  0808 07/18/23  0700 07/12/23  1209 07/12/23  0911 07/05/23  0823 07/05/23  0700 07/02/23  1754 07/02/23  1149   NA  --  141  141  --  134*  --  138  --  140  " 140   < > 147*   < > 143   < > 142   < > 137   POTASSIUM  --  4.0  4.0  --  3.8  --  3.4  --  3.2*  3.2*   < > 3.6   < > 4.1   < > 4.5   < > 4.3   CHLORIDE  --  103  103  --  100  --  103  --  104  104   < > 110*   < > 112*   < > 108*   < > 107   CO2  --  24  24  --  24  --  25  --  24  24   < > 23   < > 19*   < > 16*   < > 21*   BUN  --  51.9*  --   --   --   --   --  49.7*  --  49.2*   < > 43.6*   < > 46.3*   < > 39.1*   CR  --  5.15*  --   --   --   --   --  4.96*  --  4.77*   < > 4.44*   < > 3.30*   < > 2.20*   * 110* 130*  --    < >  --    < > 128*   < > 120*   < > 105*   < > 99   < > 128*   MAIKOL  --  8.4*  --   --   --   --   --  8.5*  --  8.5*   < > 8.3*   < > 7.7*   < > 7.9*   MAG  --   --   --   --   --   --   --   --   --   --   --  1.6*  --  1.7  --  1.7   PHOS  --  5.6*  --   --   --   --   --  4.9*  --  5.1*   < > 5.0*   < >  --   --   --     < > = values in this interval not displayed.       CBC -   Recent Labs   Lab Test 07/20/23 0538 07/11/23  0656 07/10/23  1316   WBC 8.4 8.5 9.0   HGB 8.0* 8.1* 8.2*    262 269       LFTs -   Recent Labs   Lab Test 07/20/23  0538 07/19/23  0642 07/18/23  0700 07/06/23  1708 07/06/23  0713 07/04/23  0627 06/22/23  0711 06/20/23  0704   ALKPHOS  --   --   --   --  103 99  --  93   BILITOTAL  --   --   --   --  <0.2 0.2  --  0.2   ALT  --   --   --   --  12 14  --  14   AST  --   --   --   --  9 11  --  14   PROTTOTAL  --   --   --   --  5.3* 5.9*  --  5.9*   ALBUMIN 2.7* 2.8* 2.8*   < > 2.4*  2.4* 2.6*   < > 2.3*    < > = values in this interval not displayed.       Iron Panel -   Recent Labs   Lab Test 06/26/23  0513   IRON 30*   IRONSAT 21   FADI 2,616*         Imaging:      Current Medications:    acetaminophen  975 mg Oral Q8H     amLODIPine  5 mg Oral Daily     apixaban ANTICOAGULANT  2.5 mg Oral BID     atorvastatin  40 mg Oral Daily     cefTAZidime  2 g Intravenous Q24H     ferrous sulfate  325 mg Oral Every Other Day     folic acid  1 mg  Oral Daily     heparin lock flush  5-20 mL Intracatheter Q24H     insulin aspart  1-7 Units Subcutaneous TID AC     insulin aspart  1-5 Units Subcutaneous At Bedtime     lactobacillus rhamnosus (GG)  1 capsule Oral BID     miconazole   Topical BID     multivitamin w/minerals  1 tablet Oral Daily     senna-docusate  1 tablet Oral BID     sodium chloride (PF)  10 mL Intracatheter Q8H     sodium chloride (PF)  10-40 mL Intracatheter Q8H     vancomycin place montilla - receiving intermittent dosing  1 each Intravenous See Admin Instructions     cholecalciferol  50 mcg Oral Daily       Tiffanie Maddox, NP

## 2023-07-20 NOTE — PROGRESS NOTES
[facility] Nurse Inpatient Assessment     Consulted for: left heel    Attempted to see previously for buttocks. Pt flatly refused assessment for buttocks today. Allowed assessment of left heel after much negotiation.       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  Postop course complicated by profound deconditioning.  He was admitted to TCU initially on 12/23/2022 for physical rehabilitation, however therapies no longer worked with patient after several months of an in bed therapy and his refusals to mobilize.  Underwent biopsy on 4/7/2023 without evidence of infection.  He was transferred back to [facility] for left total hip arthroplasty and reimplantation on 5/26/23 with Dr. Meyers.  Presented again to [place] for persistent MRSA bacteremia on 6/17.     Assessment:      Areas visualized during today's visit: Lower extremities     Pressure Injury Location: Left heel    7/19/23  Last photo: 7/19  Wound type: Pressure Injury     Pressure Injury Stage: either Stage 2 or 3 deferring definitive staging until wound base has evolved, hospital acquired   Wound history/plan of care:  Pt known to [facility] service. Pt known to decline cares and repositioning.   7/19 initial assessment: Spent approx 20 mins on negotiating how to move leg to be able to minimally assess wound. Pt agreed to see picture of wound. This writer explained wound to pt using the photo. Discussed with patient if he would like legs elevated on pillows or boots. He agreed to pillows.    7/20: Assessed wound with Kristen Hadley RN CWOCN. Confirmed thick  slough/eschar wound base. Again discussed keeping heels off bed with pt.  Wound base: 70 % slough/eschar, 30 % dermis around edges     Palpation of the wound bed: boggy      Drainage: small     Description of drainage: serosanguinous     Measurements (length x width x depth, in cm) 3 x 3 x 0.2 cm     Tunneling N/A     Undermining N/A  Periwound skin: Intact      Color: normal and consistent with surrounding tissue      Temperature: normal   Odor: none  Pain: moderate, shooting and stabbing  Pain intervention prior to dressing change: slow and gentle cares   Treatment goal: Infection control/prevention and soften nonviable tissue  STATUS: initial assessment  Supplies ordered: discussed with RN and discussed with patient       Treatment Plan:     Left heel wound(s): Every other day   Strongly encourage pt to elevate heels on pillows to float off bed surface at all times. (He agreed to this with WOC)  Cleanse wound with wound cleanser.   Apply light layer of Triad paste (#[id number]) to wound bed  Cut piece of adaptic to cover paste and wound  Secure with mepilex.    Orders: Written    RECOMMEND PRIMARY TEAM ORDER: None, at this time  Education provided: importance of repositioning, plan of care, wound progress and Infection prevention   Discussed plan of care with: Patient and Nurse  WOC nurse follow-up plan: twice weekly  Notify WOC if wound(s) deteriorate.  Nursing to notify the Provider(s) and re-consult the WOC Nurse if new skin concern.    DATA:     Current support surface: Bariatric Low air loss (BIN pump, Isolibrium, Pulsate, skin guard, etc)  Containment of urine/stool: Incontinence Protocol  BMI: Body mass index is 41.81 kg/m .   Active diet order: Orders Placed This Encounter      Renal Diet (non-dialysis)     Output: I/O last 3 completed shifts:  In: 100 [I.V.:100]  Out: 700 [Urine:700]     Labs:   Recent Labs   Lab 07/20/23  0538   ALBUMIN 2.7*   HGB 8.0*   WBC 8.4     Pressure injury risk assessment:    Sensory Perception: 3-->slightly limited  Moisture: 3-->occasionally moist  Activity: 2-->chairfast  Mobility: 2-->very limited  Nutrition: 2-->probably inadequate  Friction and Shear: 2-->potential problem  Rafael Score: 14    Belén Le RN CWOCN  Pager no longer is use, please contact through [employer] group: [facility]  Dept. Office Number: *[phone]

## 2023-07-20 NOTE — PROGRESS NOTES
[facility] RN found me to indicate Don told her he wanted to get in a chair. I went and saw him to confim this and he states he just wants to get out of here. I asked him if he wanted to get in a chair and he didn't respond any further.    This am he didn't want help with his urinal, but was pointing the distal end upward. I assisted so he filled the urinal. Pale color 100ccc output.  He didn't want the male purewick as he says it doesn't work.

## 2023-07-21 NOTE — PHARMACY-VANCOMYCIN DOSING SERVICE
Pharmacy Vancomycin Note  Date of Service 2023  Patient's  1950   72 year old, male    Indication: Bacteremia  Day of Therapy: started 6/15/23  Current vancomycin regimen: Intermittent vancomycin dosing due to HALEY (last dose 1500 mg IV x1 on 23)  Current vancomycin monitoring method: Trough (Method 2 = manual dose calculation)  Current vancomycin therapeutic monitoring goal: 15-20 mg/L    Current estimated CrCl = Estimated Creatinine Clearance: 17.4 mL/min (A) (based on SCr of 5.34 mg/dL (H)).    Creatinine for last 3 days  2023:  6:42 AM Creatinine 4.96 mg/dL  2023:  5:38 AM Creatinine 5.15 mg/dL  2023: 11:25 AM Creatinine 5.34 mg/dL    Recent Vancomycin Levels (past 3 days)  2023: 11:25 AM Vancomycin 22.4 ug/mL    Vancomycin IV Administrations (past 72 hours)      No vancomycin orders with administrations in past 72 hours.                Nephrotoxins and other renal medications (From now, onward)    Start     Dose/Rate Route Frequency Ordered Stop    23 0800  vancomycin (VANCOCIN) 1,500 mg in 0.9% NaCl 250 mL intermittent infusion         1,500 mg  over 90 Minutes Intravenous ONCE 23 1408      23 0813  vancomycin place montilla - receiving intermittent dosing         1 each Intravenous SEE ADMIN INSTRUCTIONS 23 0813               Contrast Orders - past 72 hours (72h ago, onward)    None          Interpretation of levels and current regimen:  Vancomycin level is reflective of supratherapeutic level    Has serum creatinine changed greater than 50% in last 72 hours: No    Urine output:  Diminished urine output    Renal Function: Worsening    Plan:  1. Give one time dose of 1500 mg IV x 1 on 23, would expect vancomycin level to be in goal range tomorrow.  2. Vancomycin monitoring method: Trough (Method 2 = manual dose calculation)  3. Vancomycin therapeutic monitoring goal: 15-20 mg/L  4. Pharmacy will check vancomycin levels as appropriate in 3-5  Days.  5. Serum creatinine levels will be ordered daily for the first week of therapy and at least twice weekly for subsequent weeks.    Sagrario Damon RPH

## 2023-07-21 NOTE — CONSULTS
"    Interventional Radiology  [facility] Consult Note  07/21/23   3:28 PM    Consult Requested: Tunneled HD line placement    Recommendations/Plan:  Patient is on IR schedule Monday 7/24 for a tunneled HD line placement.   Labs WNL for procedure.  Make NPO, but had tunneled line placed with anxiolysis and did very well 6/27  pre procedure IV antibiotics not needed due to current regimen  Medications to be held include: none  Consent will be done prior to procedure.     Please contact the IR charge RN at [phone] for estimated time of procedure.     This is a 72 year old male with complex past medical history stemming from prosthetic left hip infection, now with HALEY and nephrology recommends 2 week trial of dialysis. IR consulted for tunneled HD catheter placement. Patient has right IJ single lumen tunneled line for IV antibiotics placed 6/27 with minimal sedation (anxiolysis), this may mean the dialysis catheter goes via left IJ but will be provider dependent.    Admitted for bacteremia but no indication for updated blood cultures since several negative, last being 6/20/23    Vitals:   BP (!) 160/72 (BP Location: Left arm)   Pulse 82   Temp 97.7  F (36.5  C) (Oral)   Resp 16   Ht 1.778 m (5' 10\")   Wt 132.2 kg (291 lb 6.4 oz)   SpO2 96%   BMI 41.81 kg/m      Pertinent Labs:   Lab Results   Component Value Date    WBC 8.4 07/20/2023    WBC 8.5 07/11/2023    WBC 9.0 07/10/2023     Lab Results   Component Value Date    HGB 8.0 07/20/2023    HGB 8.1 07/11/2023    HGB 8.2 07/10/2023     Lab Results   Component Value Date     07/20/2023     07/11/2023     07/10/2023     No results found for: INR, PTT  Lab Results   Component Value Date    POTASSIUM 3.9 07/21/2023    POTASSIUM 4.2 02/06/2023        COVID-19 Antibody Results, Testing for Immunity         No data to display            COVID-19 PCR Results        4/2/2022    01:16 4/3/2022    09:22 4/6/2022    06:34 11/18/2022    13:09 " 12/23/2022    13:38   COVID-19 PCR Results   COVID-19 Virus by PCR (External Result) Negative     Negative     Negative         SARS CoV2 PCR    Negative  Negative            12/27/2022    14:30 6/17/2023    08:36   COVID-19 PCR Results   COVID-19 Virus by PCR (External Result)     SARS CoV2 PCR Negative  Negative     Details          This result is from an external source.             Que Modi PA-C  Interventional Radiology  Pager: [phone]

## 2023-07-21 NOTE — PROGRESS NOTES
"Orthopedic Surgery Progress Note: 07/21/2023    Subjective:   NAEON. Reviewed RN and consultant notes. Confusion continues; Creatinine rising. Care conference planned for today. WOCN following for left heel pressure wound.    Objective:   /62 (BP Location: Left arm, Patient Position: Semi-Fuentes's)   Pulse 85   Temp 97.6  F (36.4  C) (Oral)   Resp 16   Ht 1.778 m (5' 10\")   Wt 132.2 kg (291 lb 6.4 oz)   SpO2 96%   BMI 41.81 kg/m    I/O this shift:  In: -   Out: 100 [Urine:100]  General: NAD. Resting comfortably in bed.  Respiratory: Breathing comfortably on RA.  Musculoskeletal: Focused exam of the left lower extremity: Dressings c/d/i.  Fires GSC, TA, FHL, and EHL minimally. Incredibly sensitive to touch on feet. Overall decreased sensation distally. Brisk capillary refill. Lays in external rotation.    Laboratory Data:  Lab Results   Component Value Date    WBC 8.4 07/20/2023    HGB 8.0 (L) 07/20/2023     07/20/2023      Intraoperative cultures   6/21/2023: MRSA, C Acnes, Pseudomonas    Assessment & Plan:   Waldo Bustos is a 72 year old male status post left revision total hip arthroplasty on 5/26/2023 with Dr. Meyers now presenting with left hip prosthetic joint infection status post left hip irrigation and debridement on 6/21/2023 with Dr. Meyers.    Postoperatively noted to have confusion and increasing creatinine. Thorough workup by primary, Neuro, renal suggests metabolic encephalopathy. Possible dialysis this week, per renal.    ABLA. Intermittent transfusions while hospitalized. Good response.    CRP downtrending 40> 30> 18. C acnes, MRSA in cultures. Treating with vanc/cefepime, per ID recs. Appreciate assistance.    Ortho will continue to follow every few days. Please do not hesitate to page with questions/concerns.     Will get hip XR today since 4 weeks from OR.    Ortho Plan:  - Activity: Up with assist until independent. Posterior hip precautions x 3 months (no flexion beyond 90 " degrees, no adduction beyond midline, and no internal rotation beyond midline).  - Weightbearing Status: WBAT LLE.  - Pain Management: Transition from IV to PO as tolerated.  - Antibiotics: Vancomycin and ceftazidime, per ID/primary.  - Diet: OK for a diet from an orthopedic perspective.  - DVT Prophylaxis: SCDs and PTA apixaban okay from an ortho perspective  - Imaging: XR hip pending  - Labs: Labs PRN and per primary  - Bracing/Splinting: Abduction pillow while in bed.  - Dressings: Dressing changed 7/3.  - Drains: GUERRERO drain removed 7/3  - Tran Catheter: None. Defer to primary but consider  - Physical Therapy/Occupational Therapy: Evaluation and treatment. Signed off week of 7/12 due to patient lack of participation.  - Cultures: Intraoperative cultures 6/21/2023.  - Follow-up: Clinic with Dr. Meyers's team TBD (pending dispo plan). Will plan to get XR at that time.    - Disposition: TCU. Okay to discharge from an ortho perspective.     Hannah Starr MD, PGY-4  Orthopedics  Pager: [phone]

## 2023-07-21 NOTE — PROGRESS NOTES
Care Conference    Care conference was held on July 21, 2023 at 1:40 p.m. in pt's room. Purpose of the conference was to discuss GOC.    Attending the conference were:   Patient  Ivan Bustos - Pt's brother and surrogate decision-maker  Bedside nurse  Charge nurse  Nurse Manager  Nurse Supervisor    Dr. Alamo, Hospitalist  Tiffanie Maddox, NP, Nephrology  JOSEPH Nuno, Palliative      Summary:  Pt's brother, Ivan, was invited to join IDT for discussion of pt's wishes re: next steps of care. Pt has been deemed non-decisional, per Psych on 07/18/23, but verbalized Ivan being able to act in pt's best interest. Indications and outcomes of dialysis discussed; dialysis trial would only potentially improve pt's cognition. Pt's current status and barriers to discharge discussed. At current level of functioning (A3 for mobility and cares, and not tolerating sitting), OP HD is not an option, nor is finding LTC placement. Pt does not have any Medicare days left and is MA-pending. Pt was unable to engage in meaningful conversation during CC, did not verbalize his thoughts on dialysis. Ivan expressed understanding of all information provided and consented to pt trialing HD.       Discussion/Outcomes/Follow-Up:   Ivan to remain available by phone to consent with IR and Nephrology for beginning pt's HD.    For next GOC discussion, Ivan expressed wanting to include the other two brothers to assist with decision-making.          VANESA Minor, LSW  [facility]   PHONE: [phone]  Pager: [phone]

## 2023-07-21 NOTE — PROGRESS NOTES
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  Postop course complicated by profound deconditioning.  He was admitted to TCU initially on 12/23/2022 for physical rehabilitation, however therapies no longer worked with patient after several months of an in bed therapy and his refusals to mobilize.  Underwent biopsy on 4/7/2023 without evidence of infection.  He was transferred back to [facility] for left total hip arthroplasty and reimplantation on 5/26/23 with Dr. Meyers.  Presented again to [place] for persistent MRSA bacteremia on 6/17. His course has been c/b HALEY. Baseline creat 1.0 as recent as 6/19/23).       Assessment & Recommendations:     1. HALEY - He appeared to be in recovery given gradual decline in creat however creat has been rising again over this week, albeit rather slowly.     - Creat today 5.3, from 5.1, 4.9 4.7, 4.5 ( GFR 13) on 7/17  Creat hasn't dropped below 3 since 7/5/23  UO difficult to assess given incontinence but he has been able to use a urinal and 550 ml recorded.    - Etiology for his HALEY : Vanco ( has had supratherapeutic levels in the upper 20's to 31), Sepsis, hemodynamics, diuresis   - UA 7/20 showing granular casts, pro 70 and small LE. CBC w/diff neg for EOS. He remains on Abx   - Cystatin C w/GFR 7/17 is essentially the same ( 4.2/11 ml/mn GFR)   - BUN 53 ( but he's not eating well)   - There had been discussion regarding trial of HD for possible uremic symptoms last week. I Have been holding off this week given no acute indication to begin. However, his renal function continues to decline. Today we had care conference with brother Ivan and  full team including SW/Pall care/Hospitalist. The bottom line is that Rahat has developed significant delirium ( etiology multifactorial: extended hospitalization, cefepime neurotoxicity, depression, uremia)  that is a barrier to his recovery. He is unable to participate in any therapies and has been essentially bed bound. His delirium is severe enough to render him unable to make complex medical decisions.    - We discussed that currently Rahat would not meet OP HD criteria given that he is a transfer of 3 and would likely require a bed. The only hope at this time of any improvement in his cognitive function, likely a very small chance, is that with dialysis any contribution of uremia may improve and allow him to improve his cognition and either be clear about wanting dialysis and or become more engaged in therapies so that he could be a candidate for OP HD.     - The decision by brother Ivan was to go ahead with a 2 wk trial of HD and if at the end of the 2 wks no change in his cognitive status, discontinue HD. If Rahat improves on HD then he would ultimately remain inpatient until he was strong enough to meet OP HD criteria.     - Will plan on initiating HD on Monday. IR orders have been placed. Primary team discontinuing Apixiban and switching to Heparin   - Continue to monitor for renal recovery with daily chemistry labs/strict I/O and daily weights    2 Volume status - No edema/dyspnea/hypoxia. Bp 160'/. Intake 240 ml, Output 550 ml ( using a urinal but also incontinent).    - Furosemide 40 mg every day was discontinued 7/20/23 w/o improvement    - May need to restart a lower dose tomorrow   - Please weigh daily. Zero bed    - Continue I/O    3. HTN - Borderline control in HALEY. Bp 160's/. No edema  Current regimen: Amlodipine 5 mg every day    - Avoid hypotension. B/p ok at this time   - Hold Furosemide but reassess tomorrow    4. MRSA bacteremia- Will be completing 6 wks of Vancomycin on 8/1/23. Continue Fortaz.  "   - UC with VRE but only 10-50 k. No treatment required   - Per ID    5. Electrolytes - No acute issues today. Na 143, K 3.9   - Given recent hypernatremia continue to encourage fluid intake    6 Acid base - No acute concerns. Bicarb 24    7. BMD - Ca 8.6 Phos 5.8 albumin 3.0 ( improved)   - Malnutrition present: Getting MVI. Would benefit from dietary supplement   - No need for Phos binder at this time   - Vit D 16   - Began D3 - 50 mcq every day 7/18   - Switched to Renal diet 7/19    8. Anemia - Hgb 8.0   - Etiology acute illness   - On Iron, folic acid   - Iron studies 6/26/23: Ferritin 2616, Fe 30 IS 21   - Does have DVT history on Eliquis   - Ferritin too high for IV iron   - Transfusions per primary team    Recommendations were communicated to primary team directly    Tiffanie Maddox NP   Division of Renal Disease and Hypertension  [facility]  [employer] Web Console    Interval History :   Nursing and provider notes from last 24 hours reviewed.  Creat continues to slowly rise  Non oliguric/incontinent  Attended care conference today    Review of Systems:   I reviewed the following systems:  Patient is a questionable historian    Physical Exam:   I/O last 3 completed shifts:  In: -   Out: 100 [Urine:100]   BP (!) 160/72 (BP Location: Left arm)   Pulse 82   Temp 97.7  F (36.5  C) (Oral)   Resp 16   Ht 1.778 m (5' 10\")   Wt 132.2 kg (291 lb 6.4 oz)   SpO2 96%   BMI 41.81 kg/m       GENERAL APPEARANCE: Awake earlier today but very lethargic during the care conference this afternoon.   EYES:  no scleral icterus, pupils equal  PULM: lungs CTA. Not on supplemental oxygen   CV: RRR     -edema No edema  GI: soft, NT, Non distended  INTEGUMENT: no cyanosis  NEURO: Greeted me by name this morning and ate all of his breakfast. Was more interactive this morning but lethargic this afternoon.      Labs:   All labs reviewed by me  Electrolytes/Renal -   Recent Labs   Lab Test 07/21/23  1204 07/21/23  1125 " 07/21/23  0802 07/20/23  0810 07/20/23  0538 07/20/23  0206 07/19/23  2206 07/19/23  0809 07/19/23  0642 07/12/23  1209 07/12/23  0911 07/05/23  0823 07/05/23  0700 07/02/23  1754 07/02/23  1149   NA  --  143  --   --  141  141  --  134*   < > 140  140   < > 143   < > 142   < > 137   POTASSIUM  --  3.9  --   --  4.0  4.0  --  3.8   < > 3.2*  3.2*   < > 4.1   < > 4.5   < > 4.3   CHLORIDE  --  105  --   --  103  103  --  100   < > 104  104   < > 112*   < > 108*   < > 107   CO2  --  24  --   --  24  24  --  24   < > 24  24   < > 19*   < > 16*   < > 21*   BUN  --  53.6*  --   --  51.9*  --   --   --  49.7*   < > 43.6*   < > 46.3*   < > 39.1*   CR  --  5.34*  --   --  5.15*  --   --   --  4.96*   < > 4.44*   < > 3.30*   < > 2.20*   * 129* 112*   < > 110*   < >  --    < > 128*   < > 105*   < > 99   < > 128*   MAIKOL  --  8.6*  --   --  8.4*  --   --   --  8.5*   < > 8.3*   < > 7.7*   < > 7.9*   MAG  --   --   --   --   --   --   --   --   --   --  1.6*  --  1.7  --  1.7   PHOS  --  5.8*  --   --  5.6*  --   --   --  4.9*   < > 5.0*   < >  --   --   --     < > = values in this interval not displayed.       CBC -   Recent Labs   Lab Test 07/20/23  0538 07/11/23  0656 07/10/23  1316   WBC 8.4 8.5 9.0   HGB 8.0* 8.1* 8.2*    262 269       LFTs -   Recent Labs   Lab Test 07/21/23  1125 07/20/23  0538 07/19/23  0642 07/06/23  1708 07/06/23  0713 07/04/23  0627 06/22/23  0711 06/20/23  0704   ALKPHOS  --   --   --   --  103 99  --  93   BILITOTAL  --   --   --   --  <0.2 0.2  --  0.2   ALT  --   --   --   --  12 14  --  14   AST  --   --   --   --  9 11  --  14   PROTTOTAL  --   --   --   --  5.3* 5.9*  --  5.9*   ALBUMIN 3.0* 2.7* 2.8*   < > 2.4*  2.4* 2.6*   < > 2.3*    < > = values in this interval not displayed.       Iron Panel -   Recent Labs   Lab Test 06/26/23  0513   IRON 30*   IRONSAT 21   FADI 2,616*         Imaging:      Current Medications:   acetaminophen  975 mg Oral Q8H    amLODIPine  5 mg Oral  Daily    apixaban ANTICOAGULANT  2.5 mg Oral BID    atorvastatin  40 mg Oral Daily    cefTAZidime  2 g Intravenous Q24H    ferrous sulfate  325 mg Oral Every Other Day    folic acid  1 mg Oral Daily    heparin lock flush  5-20 mL Intracatheter Q24H    insulin aspart  1-7 Units Subcutaneous TID AC    insulin aspart  1-5 Units Subcutaneous At Bedtime    lactobacillus rhamnosus (GG)  1 capsule Oral BID    miconazole   Topical BID    multivitamin w/minerals  1 tablet Oral Daily    senna-docusate  1 tablet Oral BID    sodium chloride (PF)  10 mL Intracatheter Q8H    sodium chloride (PF)  10-40 mL Intracatheter Q8H    [START ON 7/22/2023] vancomycin  1,500 mg Intravenous Once    vancomycin place montilla - receiving intermittent dosing  1 each Intravenous See Admin Instructions    cholecalciferol  50 mcg Oral Daily       Tiffanie Maddox, NP

## 2023-07-21 NOTE — PROGRESS NOTES
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  Postop course complicated by profound deconditioning.  He was admitted to TCU initially on 12/23/2022 for physical rehabilitation, however there was no progress with therapies after several months of in bed therapy and his refusals to mobilize. Underwent biopsy on 4/7/2023 without evidence of infection.  He was transferred back to [facility] for left total hip arthroplasty and reimplantation on 5/26/23 with Dr. Meyers.  Presented again to [facility] for persistent MRSA bacteremia on 6/17.     Brief Hospital Summary:    Pt was admitted to continue infectious workup with MRSA bacteremia.  Admission blood cultures were positive, ID and Ortho were consulted. KEILA was obtained on 6/19, which was negative for endocarditis, ortho took for washout/debridement of hip on 6/21. ID recommending 6 weeks abx (6/21-8/1) via tunneled internal jugular line (nephrology asked to defer PICC). He has developed persistent encephalopathy that is felt to be multifactorial. Neurology consulted and there was concern for contribution of cefepime neurotoxicity. EEG was negative for seizures and CT head negative. He did have some mild improvement after stopping cefepime, but has not returned to baseline. Complicating the situation further is that he has developed worsening acute renal failure with now concern for uremia contributing to his mental status.     Changes Today:  - Care conference today (see below)  - Planning for 2 week trial  of dialysis to see if it improves his mental status and ability to mobilize/participate in therapies. Ivan (brother) to sign consent today, with likely plan to start on Monday.    Goals of Care  Complex medical course over the past 6+ months. Was in [facility] since 12/2022 due to profound deconditioning, but ultimately refused to participate meaningfully in therapies. Was readmitted in 6/23 with MRSA bacteremia after repeat hip surgery in 4/23. His hospital course has been complicated by acute renal failure, multifactorial encephalopathy and apathy/refusal to participate in therapies or medical cares. He was assessed by psychiatry on 7/18 and felt to be non-decisional regarding complex medical decisions or disposition. His brother (Ivan) is his legal POA, but Don does not have a healthcare directive. Care conference held on 7/21 with medicine, palliative, renal, SW, RN manager - discussed the above and options including a short trial (~2 weeks) of dialysis to see if it improves his mental function and willingness/ability to participate in therapies vs moving towards a comfort-based approach. Ultimately, Ivan decided to move forward with the trial of dialysis and a plan to reassess in ~2 weeks to determine next steps. We discussed that if he does not meaningfully improve, outpatient dialysis would likely not be an option due to his profound immobility.  - Plan to move forward with short trial of dialysis.  - Will monitor for improvement with a plan to reassess after dialysis trial to make next decisions regarding goals of care.  - Continue full code for now. Ivan to discuss more with his brothers.    Multifactorial encephalopathy - likely toxic metabolic, uremic, delirium, depression contributing  Noted to have acutely worsening mental status around ~7/4, which was initially felt to be related to cefepime neurotoxicity. Neurology consulted. EEG without seizures. CT head, ammonia and VBG wnl. Completed 5 days  of antibiotics. No clinical or objective findings concerning for worsening infection. Does have worsening renal failure with rising BUN, so some consideration that uremia could be contributing, but overall not hugely elevated. Also concern that depression is contributing.  - Delirium precautions  - Nephrology following  - After care conference on 7/21, will plan for a short trial of inpatient dialysis starting early next week to see if it improves his mental status  - Psychiatry consulted on 7/18  for capacity assessment - pt not decisional regarding complex medical decisions or disposition.    MRSA bacteremia due to possible left hip septic arthritis vs other source Blood cultures: 6/14, 6/15 + for MRSA (4/4), and 6/16 (1/2 GPCs) and 6/17 (2/2) GPCs. - started vancomycin 6/15.   6/17 sinovial fluid cultures with staph and klebsiella. Purulent drainage noted at incision site at TCU, CT left hip concerning for infection (no mention of joint, mostly anterior in soft tissue) and couldn't exclude abscess/inf  - Continue vancomycin, added rocephin 6/21 > cefepime 6/23 > ceftazidime 7/4  - ID recommends 6 weeks IV vanc (last day 8/1/23) and can transition to oral cipro 750 mg BID at discharge, needs weekly vanc level, cbcd, cmp, crp while on IV abx, regular ECG monitoring of QTc on high dose cipro --> follow up outpatient with ID prior to discontinuing antibiotics (needs appt end of July)    Acute Kidney Injury - multifactorial, see below  Nephrology consulted, appreciate recs. Baseline creatinine 1.0 or 1.1. Etiology: suspect combo of sepsis/inflammation, hypovolemia, blood loss anemia following surgery, vancomycin. UA recollected on 7/19 with granular casts suggesting ongoing dense ATN. Creatinine continuing to uptrend, however no acidosis, electrolyte abnormalities or evidence of hypervolemia. Some concern that uremia could be contributing to his encephalopathy. After GOC on 7/21, will plan to pursue short trial of  dialysis.  - Nephrology consulted  - Planning to move forward with short trial of dialysis.   - See above for GOC converation.    Bilateral LE edema due to volume overload from HALEY and iatrogenic from blood transfusions  Hyperesthesia of bilateral lower extremities (no signs of cellulitis as of 7/13)  - Holding diuresis starting 7/20    L heel pressure wound, unstagable  Noted earlier in pts hospitalization (~6/20), but at that time he refused assessment. Noted again by nursing to be worsening on 7/19 and WOCN consult was placed. Pt was agreeable and had wound assessed. He has been mostly non-agreeable to offloading or other preventative measures recommended by RN and WOCN, but will sometimes allow it with significant encouragement.  - WOCN consult  - Continue to encourage pt to utilize offloading techniques and to participate in wound cares.    Hypernatremia - resolved  Uptrending recently, 147 on 7/18. Likely volume down. S/p D5W on 7/18, resolved.  - Monitor. Holding diuretics.    Hypoxemic Respiratory Failure due to immobility, surgery - resolved  - continue to follow; encourage IS    Acute blood loss anemia following surgery, complicated by use of blood thinner  - 3 units day after surgery did joint washout (6/22) for acute blood loss anemia, 1 unit 6/25  - watch for bleeding; hold apixaban if need be - had unprovoked dvt in 2018 and would be on eliquis for life, high risk of recurrent DVT given recent procedures and illness/immobility  - iron and folic acid daily    S/p left hip explantation of left hip antibiotic spacer and revision of left total hip arthroplasty (5/26/2023)  - ortho performed washout 6/21  -Activity: 50% PWB LLE with posterior hip precautions [id number]  -AFO ordered for foot drop but patient refusing  -PT/OT  -Knee to be kept even in a foam leg elevator to relax sciatic nerve  -Pain: holding robaxin 4 times a day with mental status changes (discontinuing), APAP PRN  -stop tramadol (7/11) due  to renal function  -increase oxycodone 2.5-5 mg q6h PRN- taking very minimal amts so unlikely to be contributing to mental status    Hx of unprovoked DVT in 2018   - on eliquis 5 mg bid (dose reduced to 2.5 mg BID 7/10 with impaired renal function)     Type 2 diabetes mellitus  - Last hemoglobin A1c 5.6% on 6/16/2023.  On glipizide, metformin, Actos, and Victoza prior to hospitalizations and surgery.   - continue ISS    Constipation, improved  -Continue Colace 100 mg twice daily  -Continue senna 1 tablet twice daily-- had briefly increased to 2 tab twice daily due to lack of stool, now improved  -Continue MiraLAX daily     Hypomagnesemia  -monitor and replete      Severe malnutrition in the context of acute on chronic illness  Goal for patient is to consume % of meals TID. Goal is 4523-4184 kcals/daily. Protein needs- 100-126 grams/daily.   -RD consult and appreciate recs  -regular diet  -Weekly weights, daily I/O     Adjustment disorder with depressed mood  Prolonged grief disorder  Patient has been with depressed mood in setting of ongoing health issues, as evidenced by lack of motivation to work with therapies during both TCU admissions.  Past history of passive suicidal statements. Psychiatry saw patient during hospital admission on 5/31 due to lack of motivation with therapies. Patient declined interest in starting any psychiatric medications. Was willing to engage with health psychology.   -Outpatient health psychology consult  -Continue to monitor for willingness to start antidepressant therapy  -tried to discuss mood 7/15 but he didn't fully answer questions, seemed to talk about different aspect of his health that wasn't quite related to mood     HTN:   - Started amlodipine 7/16    Stable and Resolved Issues:  Hyperlipidemia-continue PTA Lipitor 40 mg daily  JAIR-CPAP when sleeping         Diet: Snacks/Supplements Adult: Other; Please send scrambled eggs with cheddar cheese,2  hurley slices and Hebrew  "toast for breakfast.; With Meals  Renal Diet (non-dialysis)  Room Service    DVT Prophylaxis: DOAC  Tran Catheter: Not present  Lines: PRESENT      CVC Single Lumen Right Internal jugular Non - valved (open ended);Tunneled;Power injectable-Site Assessment: WDL      Cardiac Monitoring: None  Code Status: Full Code      Clinically Significant Risk Factors              # Hypoalbuminemia: Lowest albumin = 2.2 g/dL at 7/8/2023  8:51 AM, will monitor as appropriate    # Acute Kidney Injury, unspecified: based on a >150% or 0.3 mg/dL increase in last creatinine compared to past 90 day average, will monitor renal function         # Severe Obesity: Estimated body mass index is 41.81 kg/m  as calculated from the following:    Height as of this encounter: 1.778 m (5' 10\").    Weight as of this encounter: 132.2 kg (291 lb 6.4 oz).   # Severe Malnutrition: based on nutrition assessment         Disposition Plan           Jillian Alamo MD  Hospitalist Service, GOLD TEAM 21  [facility]  Securely message with [employer] (more info)  Text page via [facility] Paging/Directory   See signed in provider for up to date coverage information  ______________________________________________________________________    Interval History   No acute events overnight. Less interactive today. See above for goals of care conversation.    Physical Exam   Vital Signs: Temp: 97.6  F (36.4  C) Temp src: Oral BP: 132/62 Pulse: 85   Resp: 16 SpO2: 96 % O2 Device: None (Room air)    Weight: 291 lbs 6.4 oz    General Appearance: NAD. Lying comfortably in bed.  Respiratory: CTAB. No increased WOB.  Cardiovascular: RRR. No m/r/g  GI: Soft. Non-tender. Non-distended.  Skin: No rash.  Other: Orientation difficult to assess. Moving all extremities. Intermittent twitching/myoclonus.    Medical Decision Making       75 MINUTES SPENT BY ME on the date of service doing chart review, history, exam, documentation & further " activities per the note.  MANAGEMENT DISCUSSED with the following over the past 24 hours: Palliative, Renal       Data     I have personally reviewed the following data over the past 24 hrs:    N/A  \   N/A   / N/A     143 105 53.6 (H) /  159 (H)   3.9 24 5.34 (H) \       ALT: N/A AST: N/A AP: N/A TBILI: N/A   ALB: 3.0 (L) TOT PROTEIN: N/A LIPASE: N/A

## 2023-07-21 NOTE — CONSULTS
GENERAL ID SERVICE CONSULTATION     Patient:  Waldo Bustos   Date of birth 1950, Medical record number [id number]  Date of Visit:  07/21/2023  Date of Admission: 6/17/2023  Consult Requester:Jillian Alamo MD          Assessment and Recommendations:   ASSESSMENT:  1. 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 Left hip PJI intraop cultures: MRSA, C.acnes, and Pseudomonas  4. RA  5. Venous insufficiency  6. HALEY, onset around 6/21, attributed to elevated vanco trough, hemodynamics, sepsis due to MRSA bacteremia  7. Delirium, likely cefepime neurotoxicity in the setting of HALEY   8. Lytic lesion in the medial L ilium, measuring 3.6x5.6 cm, present since 2018  9. DM2    DISCUSSION:     Given his history of significant vancomycin use, the VRE in urine is not completely surprising. There are no overt symptoms consistent with urinary infection at this time - recommend not treating the VRE at this time.    For the PJI, initially planned for 6 weeks of therapy. At this time we will plan to continue IV therapy with vancomycin and ceftazidine until 8/1. Following this course, we will plan to initiate therapy with doxycycline and ciprofloxacin for an additional 6 weeks. 12 weeks of therapy in total in PJI with retained hardware has shown improved outcomes compared to 6 weeks.     RECOMMENDATION:  1. Continue ceftazidime, vancomycin for C.acnes, Pseudomonas, MRSA PJI until 8/1/2023  2. Start oral doxycycline and ciprofloxacin on 8/2/2023 for an additional 6 weeks of therapy (total duration = 12 weeks)  2. No additional therapy needed for VRE in urine     Patient was discussed with Dr. Mercer.     Michael Francisco MD  Infectious Disease Fellow, PGY-4  [phone]    ________________________________________________________________    Consult Question:.  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  Postop course complicated by profound deconditioning.  He was admitted to TCU initially on 12/23/2022 for physical rehabilitation, however therapies no longer worked with patient after several months of an in bed therapy and his refusals to mobilize.  Underwent biopsy on 4/7/2023 without evidence of infection.  He was transferred back to [facility] for left total hip arthroplasty and reimplantation on 5/26/23 with Dr. Meyers.  Presented again to [facility] for persistent MRSA bacteremia on 6/17.     Infectious diseases was previously consulted given the bacteremia secondary to the PJI. ID signed off on 7/9 as cultures were finalized growing MRSA, C. Acnes, and pseudomonas - plan for 6 weeks of antibiotic therapy to end on 8/1/2023. Patient has been progressively encephalopathic with rising BUN, creatinine with declining urine out put concerning that dialysis may be in his furute. Care conference on 7/21 with plan for 2 weeks of dialysis to see what recovery or mental status changes may occur.    Given continued encephalopahty and rising creatinine though, UA obtained. Reflexed to culture and is positive for VRE. Currently, he has no urinary symptoms concerning for VRE causing disease.     Patient was feeling well when I saw him this morning. No fevers, chills, sweats, chest pain.          Review of Systems:   CONSTITUTIONAL:  No fevers or chills  EYES: negative for icterus  ENT:  negative for hearing loss, tinnitus and sore throat  RESPIRATORY:  negative for cough with sputum and dyspnea  CARDIOVASCULAR:  negative for chest pain,  dyspnea  GASTROINTESTINAL:  negative for nausea, vomiting, diarrhea and constipation  GENITOURINARY:  negative for dysuria  HEME:  No easy bruising  INTEGUMENT:  negative for rash and pruritus  NEURO:  Negative for headache         Past Medical History:     Past Medical History:   Diagnosis Date     Arthritis 5 years ago     Chronic osteoarthritis Not sure     Diabetes (H) 10-12 years ago     DVT (deep venous thrombosis) (H)      Dyslipidemia      Gastroesophageal reflux disease      History of blood transfusion      Iliopsoas abscess (H)      Infection of prosthetic hip joint (H)      JAIR (obstructive sleep apnea)      Pulmonary embolism (H)      RA (rheumatoid arthritis) (H) Not sure     Reduced vision 2-3 years ago    Cataract Surgery on both eyes     Venous insufficiency             Past Surgical History:     Past Surgical History:   Procedure Laterality Date     ARTHROPLASTY REVISION HIP Left 5/26/2023    Procedure: Revision Left Total Hip Arthroplasty;  Surgeon: Rom Meyers MD;  [facility]: [facility]     ARTHROPLASTY REVISION HIP Left 6/21/2023    Procedure: IRRIGATION AND DEBRIDEMENT LEFT HIP,;  Surgeon: Rom Meyers MD;  [facility]: [facility]     ASPIRATION NEEDLE HIP Left 4/7/2023    Procedure: ARTHROCENTESIS, LEFT HIP;  Surgeon: Rom Meyers MD;  [facility]: [facility] OR     Cataract       COLONOSCOPY       EGD       IR CVC TUNNEL PLACEMENT > 5 YRS OF AGE  6/27/2023     IR FINE NEEDLE ASPIRATION W ULTRASOUND  3/6/2023     IR IVC FILTER PLACEMENT      removed     IRRIGATION AND DEBRIDEMENT HIP, PLACE ANTIBIOTIC CEMENT BEADS / SPACER Left 11/22/2022    Procedure: and Reconstruction Using G 20 Prosthetic Antibiotic Cement Spacer;  Surgeon: Rom Meyers MD;  [facility] OR     REMOVE ANTIBIOTIC CEMENT BEADS / SPACER HIP Left 5/26/2023    Procedure: Explantation of Left Hip Antibiotic Spacer;  Surgeon: Rom Meyers MD;  [facility]     REMOVE HARDWARE ARTHROPLASTY HIP Left 11/22/2022    Procedure:  Explantation of Chronic  Infected Left Total Hip Arthroplasty, Extended Trochanteric Osteotomy with Extensive Debridement;  Surgeon: Rom Meyers MD;  [facility]     SYNOVIAL BIOPSY HIP Left 4/7/2023    Procedure: BIOPSY, SYNOVIUM, LEFT  HIP, percutaneous;  Surgeon: Rom Meyers MD;  Location[facility] OR     C PELVIS/HIP JOINT SURGERY UNLISTED       [facility] STOMACH SURGERY PROCEDURE UNLISTED  1954    2 Hernia surgeries as a child            Family History:   Reviewed and non-contributory.   Family History   Adopted: Yes   Problem Relation Age of Onset     Diabetes No family hx of      Rheumatoid Arthritis No family hx of      Low Back Problems No family hx of      Unknown/Adopted No family hx of             Social History:     Social History     Tobacco Use     Smoking status: Never     Smokeless tobacco: Never   Substance Use Topics     Alcohol use: No     History   Sexual Activity     Sexual activity: Never            Current Medications:       acetaminophen  975 mg Oral Q8H     amLODIPine  5 mg Oral Daily     apixaban ANTICOAGULANT  2.5 mg Oral BID     atorvastatin  40 mg Oral Daily     cefTAZidime  2 g Intravenous Q24H     ferrous sulfate  325 mg Oral Every Other Day     folic acid  1 mg Oral Daily     heparin lock flush  5-20 mL Intracatheter Q24H     insulin aspart  1-7 Units Subcutaneous TID AC     insulin aspart  1-5 Units Subcutaneous At Bedtime     lactobacillus rhamnosus (GG)  1 capsule Oral BID     miconazole   Topical BID     multivitamin w/minerals  1 tablet Oral Daily     senna-docusate  1 tablet Oral BID     sodium chloride (PF)  10 mL Intracatheter Q8H     sodium chloride (PF)  10-40 mL Intracatheter Q8H     vancomycin place montilla - receiving intermittent dosing  1 each Intravenous See Admin Instructions     cholecalciferol  50 mcg Oral Daily       @         Allergies:     Allergies   Allergen Reactions     Sulfa Antibiotics      Other reaction(s): *Unknown - Childhood Rxn     Tizanidine  Other (See Comments)     Frequent urination; causes drowsiness, and dry mouth              Physical Exam:   Vitals were reviewed  Patient Vitals for the past 24 hrs:   BP Temp Temp src Pulse Resp SpO2   07/21/23 0815 (!) 160/72 97.7  F (36.5  C) Oral 82 16 96 %   07/20/23 2128 132/62 97.6  F (36.4  C) Oral 85 16 96 %   07/20/23 1605 (!) 157/71 97.8  F (36.6  C) Oral 76 16 94 %       Physical Examination:  GENERAL:  well-developed, well-nourished, in bed in no acute distress.   HEENT:  Head is normocephalic, atraumatic   EYES:  Eyes have anicteric sclerae without conjunctival injection or stigmata of endocarditis.    ENT:  Oropharynx is moist without exudates or ulcers. Tongue is midline  NECK:  Supple. No cervical lymphadenopathy  LUNGS:  Clear to auscultation bilateral.   CARDIOVASCULAR:  Regular rate and rhythm with no murmurs, gallops or rubs.  ABDOMEN:  Normal bowel sounds, soft, nontender. No appreciable hepatosplenomegaly  MSK: Left hip with intact dressing, no leaking through  SKIN:  No acute rashes.  Line(s) are in place without any surrounding erythema or exudate. No stigmata of endocarditis.  NEUROLOGIC:  Grossly nonfocal. Active x4 extremities         Laboratory Data:     Inflammatory Markers    Recent Labs   Lab Test 06/26/23  0513 02/06/23  0839 01/30/23  0835 01/23/23  0748 01/16/23  0748 01/09/23  1212 01/02/23  0834 12/28/22  0615 12/19/22  0905   SED 1  --   --   --   --  77*  --   --  86*   CRP  --  3.6 <2.9 <2.9 <2.9 <2.9 5.3 <2.9 6.1       Hematology Studies    Recent Labs   Lab Test 07/20/23  0538 07/11/23  0656 07/10/23  1316 07/09/23  1145 07/08/23  0851 07/07/23  1251   WBC 8.4 8.5 9.0 11.8* 9.0 9.4   HGB 8.0* 8.1* 8.2* 8.2* 7.5* 7.7*   MCV 94 96 95 96 95 94    262 269 299 286 310       Metabolic Studies     Recent Labs   Lab Test 07/20/23  0538 07/19/23  2206 07/19/23  1512 07/19/23  0642 07/18/23  2215 07/18/23  0700 07/17/23  0841 07/16/23  1014     141 134* 138 140  140 140  147* 144 142   POTASSIUM 4.0  4.0 3.8 3.4 3.2*  3.2* 3.6 3.6 3.5 3.6   CHLORIDE 103  103 100 103 104  104 105 110* 108* 108*   CO2 24  24 24 25 24  24 24 23 23 22   BUN 51.9*  --   --  49.7*  --  49.2* 47.5* 46.6*   CR 5.15*  --   --  4.96*  --  4.77* 4.59* 4.59*   GFRESTIMATED 11*  --   --  12*  --  12* 13* 13*       Hepatic Studies    Recent Labs   Lab Test 07/20/23  0538 07/19/23  0642 07/18/23  0700 07/17/23  0841 07/16/23  1014 07/15/23  0727 07/06/23  1708 07/06/23  0713 07/04/23  0627 06/22/23  0711 06/20/23  0704 06/19/23  0915 06/16/23  0605 06/14/23  0651   BILITOTAL  --   --   --   --   --   --   --  <0.2 0.2  --  0.2 0.2 0.2 0.2   ALKPHOS  --   --   --   --   --   --   --  103 99  --  93 94 114 110   ALBUMIN 2.7* 2.8* 2.8* 2.8* 2.6* 2.7*   < > 2.4*  2.4* 2.6*   < > 2.3* 2.6* 2.8* 2.8*   AST  --   --   --   --   --   --   --  9 11  --  14 19 16 16   ALT  --   --   --   --   --   --   --  12 14  --  14 12 10 8    < > = values in this interval not displayed.       Microbiology:  Culture   Date Value Ref Range Status   07/19/2023 10,000-50,000 CFU/mL Enterococcus faecium VRE (A)  Preliminary     Comment:     Preliminary result, confirmation pending.     06/21/2023 1+ Cutibacterium (Propionibacterium) acnes (A)  Final     Comment:     Susceptibility testing of Cutibacterium (Propionibacterium) species is not done from this source. This organism is susceptible to penicillin and cefotaxime and most are susceptible to clindamycin.   06/21/2023 No anaerobic organisms isolated  Final   06/21/2023 No anaerobic organisms isolated  Final   06/21/2023 No anaerobic organisms isolated  Final   06/21/2023 1+ Staphylococcus aureus (A)  Final     Comment:     Susceptibilities done on previous cultures   06/21/2023 1+ Pseudomonas aeruginosa (A)  Final   06/21/2023 1+ Staphylococcus aureus MRSA (A)  Final   06/21/2023 2+ Staphylococcus aureus (A)  Final     Comment:     Susceptibilities done on previous cultures    06/21/2023 1+ Pseudomonas aeruginosa (A)  Final     Comment:     Susceptibilities done on previous cultures   06/21/2023 1+ Staphylococcus aureus (A)  Final     Comment:     Susceptibilities done on previous cultures   06/21/2023 2+ Staphylococcus aureus (A)  Final     Comment:     Susceptibilities done on previous cultures   06/21/2023 No anaerobic organisms isolated  Final   06/21/2023 2+ Staphylococcus aureus MRSA (A)  Final   06/20/2023 No Growth  Final   06/20/2023 No Growth  Final   06/19/2023 No Growth  Final   06/19/2023 No Growth  Final   06/17/2023 4+ Staphylococcus aureus MRSA (A)  Final   06/17/2023 2+ Corynebacterium striatum (A)  Final     Comment:     Identification obtained by MALDI-TOF mass spectrometry research use only database. Test characteristics determined and verified by the Infectious Diseases Diagnostic Laboratory.   06/17/2023 1+ Klebsiella pneumoniae (A)  Final   06/17/2023 No anaerobic organisms isolated  Final   06/17/2023 Positive on the 1st day of incubation (A)  Final   06/17/2023 Staphylococcus aureus MRSA (AA)  Final     Comment:     1 of 2 bottlesSusceptibilities done on previous cultures   06/17/2023 Positive on the 1st day of incubation (A)  Final   06/17/2023 Staphylococcus aureus MRSA (AA)  Final     Comment:     1 of 2 bottlesSusceptibilities done on previous cultures   06/17/2023 <10,000 CFU/mL Urogenital marimar  Final   06/16/2023 Positive on the 1st day of incubation (A)  Final   06/16/2023 Staphylococcus aureus MRSA (AA)  Final     Comment:     1 of 2 bottlesSusceptibilities done on previous cultures   06/16/2023 Positive on the 1st day of incubation (A)  Final   06/16/2023 Staphylococcus aureus MRSA (AA)  Final     Comment:     1 of 2 bottlesSusceptibilities done on previous cultures   06/15/2023 Positive on the 1st day of incubation (A)  Final   06/15/2023 Staphylococcus aureus MRSA (AA)  Final     Comment:     2 of 2 bottlesSusceptibilities done on previous cultures    06/15/2023 Positive on the 1st day of incubation (A)  Final   06/15/2023 Staphylococcus aureus MRSA (AA)  Final     Comment:     2 of 2 bottlesSusceptibilities done on previous cultures   06/14/2023 Positive on the 1st day of incubation (A)  Final   06/14/2023 Staphylococcus aureus MRSA (AA)  Final     Comment:     2 of 2 bottlesSusceptibilities done on previous cultures   06/14/2023 Positive on the 1st day of incubation (A)  Final   06/14/2023 Staphylococcus aureus MRSA (AA)  Final     Comment:     2 of 2 bottles     05/26/2023 No anaerobic organisms isolated  Final   05/26/2023 No anaerobic organisms isolated  Final   05/26/2023 No anaerobic organisms isolated  Final   05/26/2023 No anaerobic organisms isolated  Final   05/26/2023 No Growth  Final   05/26/2023 No Growth  Final   05/26/2023 No Growth  Final   05/26/2023 No Growth  Final   05/26/2023 No anaerobic organisms isolated  Final   05/26/2023 No Growth  Final   04/07/2023 No anaerobic organisms isolated  Final   04/07/2023 No Growth  Final   04/07/2023 No anaerobic organisms isolated  Final   04/07/2023 No Growth  Final   04/07/2023 No anaerobic organisms isolated  Final   04/07/2023 No Growth  Final   04/07/2023 No anaerobic organisms isolated  Final   04/07/2023 No Growth  Final   04/07/2023 No anaerobic organisms isolated  Final   04/07/2023 No Growth  Final   04/07/2023 No anaerobic organisms isolated  Final   04/07/2023 No Growth  Final   11/22/2022 4+ Finegoldia magna (A)  Final     Comment:     Susceptibilities done on previous cultures   11/22/2022 No Growth  Final   11/22/2022 1+ Proteus mirabilis (A)  Final     Comment:     Susceptibilities done on previous cultures   11/22/2022 1+ Finegoldia magna (A)  Final     Comment:     Susceptibilities done on previous cultures   11/22/2022 No Growth  Final   11/22/2022 1+ Proteus mirabilis (A)  Final   11/22/2022 No anaerobic organisms isolated  Final   11/22/2022 No anaerobic organisms isolated  Final    11/22/2022 No anaerobic organisms isolated  Final   11/22/2022 No Growth  Final   11/22/2022 No Growth  Final   11/22/2022 No Growth  Final   11/22/2022 Isolated in broth only Proteus mirabilis (A)  Final     Comment:     On day 1 of incubationSusceptibilities done on previous cultures   11/22/2022 Isolated in broth only Proteus mirabilis (A)  Final     Comment:     On day 1 of incubationSusceptibilities done on previous cultures   11/22/2022 1+ Proteus mirabilis (A)  Final   11/22/2022 No anaerobic organisms isolated  Final   11/22/2022 No Growth  Final   11/22/2022 1+ Proteus mirabilis (A)  Final     Comment:     Susceptibilities done on previous cultures   11/14/2022 4+ Porphyromonas species (A)  Final     Comment:     Beta Lactamase Positive  Identification obtained by MALDI-TOF mass spectrometry research use only database. Test characteristics determined and verified by the Infectious Diseases Diagnostic Laboratory.   11/14/2022 1+ Finegoldia magna (A)  Corrected   11/14/2022 3+ Proteus mirabilis (A)  Final   11/14/2022 No Growth  Final       Urine Studies    Recent Labs   Lab Test 07/19/23  0957 06/30/23  2041 06/22/23  1457 06/17/23  0817   LEUKEST Small* Small* Large* Moderate*   WBCU 27* 3 66* 14*       Vancomycin Levels    Recent Labs   Lab Test 07/16/23  1014 07/15/23  0727 07/13/23  0838   VANCOMYCIN 22.2 24.9 28.2*       Hepatitis B Testing No lab results found.  Hepatitis C Testing   No results found for: HCVAB, HQTG, HCGENO, HCPCR, HQTRNA, HEPRNA  Respiratory Virus Testing    No results found for: RS, FLUAG

## 2023-07-21 NOTE — PROGRESS NOTES
"  VS: BP (!) 160/72 (BP Location: Left arm)   Pulse 82   Temp 97.7  F (36.5  C) (Oral)   Resp 16   Ht 1.778 m (5' 10\")   Wt 132.2 kg (291 lb 6.4 oz)   SpO2 96%   BMI 41.81 kg/m     O2: 96% on RA   Output: Voiding using urinal at bedside. Incontinence pad and brief in use.   Last BM: 7/20 and loose per pt   Activity: Pt bedfast with assist x2 needed. Frequently refuses repositioning requests from staff. Education given on risks of not repositioning often enough.   Skin: Pt has scattered bruising and dry and cracked feet and heels. Unstageable pressure injury on left heel present. Preventative padding on heels, and coccyx.    Pain: Pt reported 10/10 pain which was controlled with dilaudid. It was effective per pt.   CMS: A&Ox2-3 with intermittent confusion. Pt is able to reorient when given information and time. Pt is slow at processing information at times and has difficulty with word finding.    Dressing: Right chesr CVC CDI, previously mentioned preventative measures intact.    Diet: Renal diet   LDA: Right chest CVC saline locked   Equipment:    Plan: Continue POC   Additional Info:          "

## 2023-07-21 NOTE — PLAN OF CARE
"  VS: /62 (BP Location: Left arm, Patient Position: Semi-Fuentes's)   Pulse 85   Temp 97.6  F (36.4  C) (Oral)   Resp 16   Ht 1.778 m (5' 10\")   Wt 132.2 kg (291 lb 6.4 oz)   SpO2 96%   BMI 41.81 kg/m      O2: Stable on room air>90%   Output: Using urinal at bedside  Can be incontinent of bowel and bladder   Last BM: 7/19/23   Activity: Not OOB overnight. Refusing repositioning. Pt was educated on the risks and importance of repositioning. Still refusing   assist of 2-3 with  Lift device   Skin: Left hip surgical incision  Unable to assess- sacrum and left heel pressure ulcer- patient refused. Writer educated on the benefits and risks. Not effective.    Pain: 8/10 managed with scheduled Tylenol and PRN oxycodone   CMS/Neuro: Intermittent confusion, Disoriented to time,slow to respond, word-finding.    Dressing: Left hip-CDI   Diet: Reg diet. Poor appetite. BG checks at 2AM- 118    LDA: CVC right upper chest- Saline locked   Plan: Count with POC   Additional Info:                             "

## 2023-07-21 NOTE — PROGRESS NOTES
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 His post operative course has been complicated by deconditioning. He was discharged to TCU on 12/23/22 and was subsequently readmitted on 5/26/23 for left total hip arthroplasty and reimplantation and discharged 6/2. He most recently presented again on 6/17 with MRSA bacteremia. His present hospital course has been complicated by HALEY and declining function likely needing dialysis, encephalopathy.      Today, the patient was seen for:  Encephalopathy  HALEY  Bacteremia  Sepsis     Prognosis, Goals, or Advance Care Planning was addressed today with: Yes.    Mood, coping, and/or meaning in the context of serious illness were addressed today: Yes.  Summary/Comments: met with brother and patient, patient was not able to participate due his current mental status. Discussed that overall he has not really made any improvements. Discussed his current problem as his kidneys continue to worsen and also discussed his overall physical decline as a result of multiple factors and his multifactorial delirium. Discussed the options from here on out including dialysis and life prolonging measures and as well as comfort focused approach. Discussed giving him a time limited trial of dialysis to evaluate if this helps him participate in decision making.  Discussed that brother, Zeferino, would want to include the other brothers in end of life decision making if that is needed. He says they are all on board with dialysis if needed. Discussed revisiting at the end of the trial and discussing again where things are at. Attempted to get Rahat's voice and preferences and Rahat really was not able. Upon asking him directly if he wants to do dialysis or would want to die, he didn't verbally respond but reacted, I then asked if he would be ok with short term, and he said well no. Its really hard to understand what his preference is with his current level of confusion.               Interval History:     Chart review/discussion with unit or clinical team members:   No urgent needs for dialysis. Creatinine rising. Continued confusion.    Per patient or family/caregivers today:  Don in bed, brother at bedside. Had just gotten up onto commode.    Key Palliative Symptoms:  We are not helping to manage these symptoms currently in this patient.               Review of Systems:     Besides above, ROS was reviewed and is unremarkable          Medications:     I have reviewed this patient's medication profile and medications during this hospitalization.           Physical Exam:   Vitals were reviewed  Temp: 97.7  F (36.5  C) Temp src: Oral BP: (!) 160/72 Pulse: 82   Resp: 16 SpO2: 96 % O2 Device: None (Room air)      Intake/Output Summary (Last 24 hours) at 7/21/2023 1021  Last data filed at 7/21/2023 0554  Gross per 24 hour   Intake --   Output 350 ml   Net -350 ml     GEN:  Alert, oriented x 2, appears comfortable, NAD. Poor eye contact and slow to respond, generalized myoclonic jerking.  HEENT:  Normocephalic/atraumatic, no scleral icterus, no nasal discharge, mouth moist.  LUNGS:    Symmetric chest rise on inhalation noted. Non labored breathing.  SKIN:  Dry to touch, no exanthems noted in the visualized areas.             Data Reviewed:     Reviewed recent pertinent imaging, comments:    none    Reviewed recent labs, comments:   Labs pending for today      JOSEPH Rice CNS  [facility], Palliative Care  Securely message with the [employer] Web Console (learn more here) or  Text page via [employer] Paging/Directory     90 minutes spent chart reviewing, coordinating care with medical team, discussing goals of care with patient and family, providing education regarding medical options, and documenting.

## 2023-07-22 NOTE — PLAN OF CARE
"  VS: BP (!) 143/73   Pulse 80   Temp 98.2  F (36.8  C) (Oral)   Resp 16   Ht 1.778 m (5' 10\")   Wt 132.2 kg (291 lb 6.4 oz)   SpO2 95%   BMI 41.81 kg/m     O2: 95% at room air.    Output: Uses urinal but incontinent most of the time. Brief is always wet.    Last BM: Has smear of BM 07/21.    Activity: Turn side to side and cooperative with cares. Brief, chucks and sacral mepilex changed at 10:30pm. 2nd brief & chucks changed done at 06:00am.    Skin: L heel wound   Blanchable redness on buttocks, scrotal area, caridad area and inguinal area.    Pain: Managed by scheduled tylenol.   CMS: Alert but has intermittent disorientation and confusion.   Patient stated \" How can I go back to the hospital\" nurse allowed time for the patient to process on his own what he is saying, then he said \"Oh Im already in the hospital\"  Another instance, the patient ask nurse \" Who brought me to the hospital this afternoon?\" Nurse explained that he has been in the hospital for a month.    Dressing: L hip dressing,CDI   L heel wound dressing done 06/22/23 at 0600am.    Diet: BG- 165 & 121   LDA: Right CVC,  Heparin locked.    Equipment: IV pole & pump,    Plan: Per IR note:   Patient is on IR schedule Monday 7/24 for a tunneled HD line placement. NPO Sunday night.    Additional Info:                             "

## 2023-07-22 NOTE — PROGRESS NOTES
VS:     Pt A/O X 4. Afebrile. VSS on RA. Lungs- clear bilaterally with both anterior and lateral. Denies nausea, shortness of breath, and chest pain.     Output:     Bowels- active in all four quadrants. Last BM 7/20. Voids spontaneously without difficulty in the bedside urinal, incontinence at times.      Activity:     Pt up with 2x assist with ceiling lift. Pt consistently refusing repositioning, cares, and assessments.      Skin: Surgical incision to L hip, unstageable wound to L heel, blanchable redness and abrasions to buttocks,agreeable to floating BLE at this time.     Pain:     Has chronic pain in bilateral shoulders and hips and given oxycodone and is tolerating adequately.      CMS:     CMS and Neuro's are intact. Denies numbness and tingling in all extremities.      Dressing:     Surgical incisional dressing is CDI, refused changing dressing on L heel.     Diet:     Pt is on a renal diet and appetite was fair this shift. Pt becomes upset when trays he has not ordered are delivered, please check with nursing staff that order was placed by pt rather than diet office.       LDA:     CVC is patent and heparin locked      Equipment:     Bariatric pulsate, IV pole, ceiling lift.     Plan:     IM, nephrology, palliative, and SW following. Care conference this afternoon with plan for dialysis following IR procedure Monday. Call light within reach.      Additional Info:

## 2023-07-22 NOTE — PROGRESS NOTES
"  Nephrology Progress Note  07/22/2023         Assessment & Recommendations:     HALEY stage II  Recurrent HALEY in setting of significant hemodynamic changes including diuretics and Vancomycin use   Since his admission, have had multiple HALEY episodes, recently a week ago when he was given diuretics for volume management among others including continue use of vancomycin (level was slightly high on 7/13). Now his creatinine continue to trend up. Also noted pt continued to be altered intermittently. Question about uremic syndrome raised which can not be rule d out at this point with continued renal dysfunction for quite some time.    Please refer to note of Megan Kessler on 7/21/23 for care conference and decision about HD.    - no acute indication for HD today based on labs or exam   - hold diuresis as you are doing now  - plan for IR to place perm cath on Monday as there is likely chance he might require long term dialysis.    Recommendations were communicated to primary team via this note    Zayda Lucero MD   Division of Renal Disease and Hypertension  [facility]  [email or web address]  Vocera Web Console    Interval History :   Nursing and provider notes from last 24 hours reviewed.  In the last 24 hours Waldo Rodriguezy been hemodynamically stable. No new symptoms provided today.  Review of Systems:   Was unable to review completely as he was altered and also not interested in answering questions.    Physical Exam:   I/O last 3 completed shifts:  In: -   Out: 250 [Urine:250]   BP (!) 153/99   Pulse (!) 130   Temp 97.3  F (36.3  C) (Oral)   Resp 16   Ht 1.778 m (5' 10\")   Wt 132.2 kg (291 lb 6.4 oz)   SpO2 92%   BMI 41.81 kg/m       General : Pt awake, not in acute distress   Lungs : anterior lung fields are coarse  Cardiac : S1, S2 present  Abdomen : Soft/ND/NT  LE : Edema noted  Dialysis Access : N/A    Labs:   All labs reviewed by me  Electrolytes/Renal - Recent Labs   Lab Test 07/22/23  0827 07/22/23  0747 " 07/22/23  0145 07/21/23  1204 07/21/23  1125 07/20/23  0810 07/20/23  0538 07/12/23  1209 07/12/23  0911 07/05/23  0823 07/05/23  0700 07/02/23  1754 07/02/23  1149   NA  --  144  --   --  143  --  141  141   < > 143   < > 142   < > 137   POTASSIUM  --  4.1  --   --  3.9  --  4.0  4.0   < > 4.1   < > 4.5   < > 4.3   CHLORIDE  --  106  --   --  105  --  103  103   < > 112*   < > 108*   < > 107   CO2  --  23  --   --  24  --  24  24   < > 19*   < > 16*   < > 21*   BUN  --  57.0*  --   --  53.6*  --  51.9*   < > 43.6*   < > 46.3*   < > 39.1*   CR  --  5.71*  --   --  5.34*  --  5.15*   < > 4.44*   < > 3.30*   < > 2.20*   * 127* 121*   < > 129*   < > 110*   < > 105*   < > 99   < > 128*   MAIKOL  --  8.9  --   --  8.6*  --  8.4*   < > 8.3*   < > 7.7*   < > 7.9*   MAG  --   --   --   --   --   --   --   --  1.6*  --  1.7  --  1.7   PHOS  --  6.2*  --   --  5.8*  --  5.6*   < > 5.0*   < >  --   --   --     < > = values in this interval not displayed.       CBC -   Recent Labs   Lab Test 07/20/23  0538 07/11/23  0656 07/10/23  1316   WBC 8.4 8.5 9.0   HGB 8.0* 8.1* 8.2*    262 269       LFTs -   Recent Labs   Lab Test 07/22/23  0747 07/21/23  1125 07/20/23  0538 07/06/23  1708 07/06/23  0713 07/04/23  0627 06/22/23  0711 06/20/23  0704   ALKPHOS  --   --   --   --  103 99  --  93   BILITOTAL  --   --   --   --  <0.2 0.2  --  0.2   ALT  --   --   --   --  12 14  --  14   AST  --   --   --   --  9 11  --  14   PROTTOTAL  --   --   --   --  5.3* 5.9*  --  5.9*   ALBUMIN 3.0* 3.0* 2.7*   < > 2.4*  2.4* 2.6*   < > 2.3*    < > = values in this interval not displayed.       Iron Panel -   Recent Labs   Lab Test 06/26/23  0513   IRON 30*   IRONSAT 21   FADI 2,616*       Current Medications:    acetaminophen  975 mg Oral Q8H     amLODIPine  5 mg Oral Daily     [Held by provider] apixaban ANTICOAGULANT  2.5 mg Oral BID     atorvastatin  40 mg Oral Daily     cefTAZidime  2 g Intravenous Q24H     ferrous sulfate  325 mg  Oral Every Other Day     folic acid  1 mg Oral Daily     heparin ANTICOAGULANT  5,000 Units Subcutaneous Q12H     heparin lock flush  5-20 mL Intracatheter Q24H     insulin aspart  1-7 Units Subcutaneous TID AC     insulin aspart  1-5 Units Subcutaneous At Bedtime     lactobacillus rhamnosus (GG)  1 capsule Oral BID     miconazole   Topical BID     multivitamin w/minerals  1 tablet Oral Daily     senna-docusate  1 tablet Oral BID     sodium chloride (PF)  10 mL Intracatheter Q8H     sodium chloride (PF)  10-40 mL Intracatheter Q8H     vancomycin place montilla - receiving intermittent dosing  1 each Intravenous See Admin Instructions     cholecalciferol  50 mcg Oral Daily       Zayda Lucero MD

## 2023-07-22 NOTE — PROGRESS NOTES
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  Postop course complicated by profound deconditioning.  He was admitted to TCU initially on 12/23/2022 for physical rehabilitation, however there was no progress with therapies after several months of in bed therapy and his refusals to mobilize. Underwent biopsy on 4/7/2023 without evidence of infection.  He was transferred back to [facility] for left total hip arthroplasty and reimplantation on 5/26/23 with Dr. Meyers.  Presented again to [facility] for persistent MRSA bacteremia on 6/17.     Brief Hospital Summary:    Pt was admitted to continue infectious workup with MRSA bacteremia.  Admission blood cultures were positive, ID and Ortho were consulted. KEILA was obtained on 6/19, which was negative for endocarditis, ortho took for washout/debridement of hip on 6/21. ID recommending 6 weeks IV abx (6/21-8/1) via tunneled internal jugular line (nephrology asked to defer PICC) followed by 6 weeks of PO cipro/doxy. He has developed persistent encephalopathy that is felt to be multifactorial. Neurology consulted and there was concern for contribution of cefepime neurotoxicity. EEG was negative for seizures and CT head negative. He did have some mild improvement after stopping cefepime, but has not returned to baseline. Complicating the situation further is that he has developed worsening acute renal failure with now concern for uremia contributing to his mental status. Care conference on 7/21 with brother Ivan  as surrogate decision maker with plan for short trial of dialysis to determine if pt has any meaningful improvement.    Changes Today:  - No medical changes today  - Continue heparin subcutaneous BID. Hold DOAC for procedure Monday.  - Will by NPO at midnight Sunday night for HD line placement Monday with IR.    Goals of Care  Complex medical course over the past 6+ months. Was in [facility] since 12/2022 due to profound deconditioning, but ultimately refused to participate meaningfully in therapies. Was readmitted in 6/23 with MRSA bacteremia after repeat hip surgery in 4/23. His hospital course has been complicated by acute renal failure, multifactorial encephalopathy and apathy/refusal to participate in therapies or medical cares. He was assessed by psychiatry on 7/18 and felt to be non-decisional regarding complex medical decisions or disposition. His brother (Ivan) is his legal POA, but Don does not have a healthcare directive. Care conference held on 7/21 with medicine, palliative, renal, SW, RN manager - discussed the above and options including a short trial (~2 weeks) of dialysis to see if it improves his mental function and willingness/ability to participate in therapies vs moving towards a comfort-based approach. Ultimately, Ivan decided to move forward with the trial of dialysis and a plan to reassess in ~2 weeks to determine next steps. We discussed that if he does not meaningfully improve, outpatient dialysis would likely not be an option due to his profound immobility.  - Plan to move forward with short trial of dialysis.  - Will monitor for improvement with a plan to reassess after dialysis trial to make next decisions regarding goals of care.  - Continue full code for now. Ivan to discuss more with his brothers.    Multifactorial encephalopathy - likely toxic metabolic, uremic, delirium, depression contributing  Noted to have acutely worsening mental status around ~7/4, which was initially felt  to be related to cefepime neurotoxicity. Neurology consulted. EEG without seizures. CT head, ammonia and VBG wnl. Completed 5 days of antibiotics. No clinical or objective findings concerning for worsening infection. Does have worsening renal failure with rising BUN, so some consideration that uremia could be contributing, but overall not hugely elevated. Also concern that depression is contributing.  - Delirium precautions  - Nephrology following  - After care conference on 7/21, will plan for a short trial of inpatient dialysis starting early next week to see if it improves his mental status  - Psychiatry consulted on 7/18  for capacity assessment - pt not decisional regarding complex medical decisions or disposition.    MRSA bacteremia due to possible left hip septic arthritis vs other source Blood cultures: 6/14, 6/15 + for MRSA (4/4), and 6/16 (1/2 GPCs) and 6/17 (2/2) GPCs. - started vancomycin 6/15.   6/17 sinovial fluid cultures with staph and klebsiella. Purulent drainage noted at incision site at TCU, CT left hip concerning for infection (no mention of joint, mostly anterior in soft tissue) and couldn't exclude abscess/inf  - ID intermittently following through hospital course. Last seen 7/21.  - Continue ceftazidime, vancomycin for C.acnes, Pseudomonas, MRSA PJI until 8/1/2023  - Start oral doxycycline and ciprofloxacin on 8/2/2023 for an additional 6 weeks of therapy (total duration = 12 weeks)    Acute Kidney Injury - multifactorial, see below  Nephrology consulted, appreciate recs. Baseline creatinine 1.0 or 1.1. Etiology: suspect combo of sepsis/inflammation, hypovolemia, blood loss anemia following surgery, vancomycin. UA recollected on 7/19 with granular casts suggesting ongoing dense ATN. Creatinine continuing to uptrend, however no acidosis, electrolyte abnormalities or evidence of hypervolemia. Some concern that uremia could be contributing to his encephalopathy. After GOC on 7/21, will plan to  pursue short trial of dialysis.  - Nephrology consulted  - Planning to move forward with short trial of dialysis.   - See above for GOC converation.    Asymptomatic bacteruria (VRE)  UA obtained on 7/19 to assess for casts, reflexed to culture which is growing VRE. Labs and vitals with low concern for new infection. Discussed with ID who recommended against treatment.    Bilateral LE edema due to volume overload from HALEY and iatrogenic from blood transfusions  Hyperesthesia of bilateral lower extremities (no signs of cellulitis as of 7/13)  - Holding diuresis starting 7/20    L heel pressure wound, unstagable  Noted earlier in pts hospitalization (~6/20), but at that time he refused assessment. Noted again by nursing to be worsening on 7/19 and WOCN consult was placed. Pt was agreeable and had wound assessed. He has been mostly non-agreeable to offloading or other preventative measures recommended by RN and WOCN, but will sometimes allow it with significant encouragement.  - WOCN consult  - Continue to encourage pt to utilize offloading techniques and to participate in wound cares.    Hypernatremia - resolved  Uptrending recently, 147 on 7/18. Likely volume down. S/p D5W on 7/18, resolved.  - Monitor. Holding diuretics.    Hypoxemic Respiratory Failure due to immobility, surgery - resolved  - continue to follow; encourage IS    Acute blood loss anemia following surgery, complicated by use of blood thinner  - 3 units day after surgery did joint washout (6/22) for acute blood loss anemia, 1 unit 6/25  - watch for bleeding; hold apixaban if need be - had unprovoked dvt in 2018 and would be on eliquis for life, high risk of recurrent DVT given recent procedures and illness/immobility  - iron and folic acid daily    S/p left hip explantation of left hip antibiotic spacer and revision of left total hip arthroplasty (5/26/2023)  - ortho performed washout 6/21  -Activity: 50% PWB LLE with posterior hip precautions  [id number]  -AFO ordered for foot drop but patient refusing  -PT/OT  -Knee to be kept even in a foam leg elevator to relax sciatic nerve  -Pain: holding robaxin 4 times a day with mental status changes (discontinuing), APAP PRN  -stop tramadol (7/11) due to renal function  -increase oxycodone 2.5-5 mg q6h PRN- taking very minimal amts so unlikely to be contributing to mental status    Hx of unprovoked DVT in 2018   - Continue heparin subcutaneous BID pending line placement on 7/24  - Hold PTA apixiban     Type 2 diabetes mellitus  - Last hemoglobin A1c 5.6% on 6/16/2023.  On glipizide, metformin, Actos, and Victoza prior to hospitalizations and surgery.   - continue ISS    Constipation, improved  -Continue Colace 100 mg twice daily  -Continue senna 1 tablet twice daily-- had briefly increased to 2 tab twice daily due to lack of stool, now improved  -Continue MiraLAX daily     Hypomagnesemia  -monitor and replete      Severe malnutrition in the context of acute on chronic illness  Goal for patient is to consume % of meals TID. Goal is 2930-3263 kcals/daily. Protein needs- 100-126 grams/daily.   -RD consult and appreciate recs  -regular diet  -Weekly weights, daily I/O     Adjustment disorder with depressed mood  Prolonged grief disorder  Patient has been with depressed mood in setting of ongoing health issues, as evidenced by lack of motivation to work with therapies during both TCU admissions.  Past history of passive suicidal statements. Psychiatry saw patient during hospital admission on 5/31 due to lack of motivation with therapies. Patient declined interest in starting any psychiatric medications. Was willing to engage with health psychology.   -Outpatient health psychology consult  -Continue to monitor for willingness to start antidepressant therapy  -tried to discuss mood 7/15 but he didn't fully answer questions, seemed to talk about different aspect of his health that wasn't quite related to  "mood     HTN:   - Started amlodipine 7/16    Stable and Resolved Issues:  Hyperlipidemia-continue PTA Lipitor 40 mg daily  JAIR-CPAP when sleeping           Diet: Renal Diet (non-dialysis)  Room Service    DVT Prophylaxis: Heparin SQ  Tran Catheter: Not present  Lines: PRESENT      CVC Single Lumen Right Internal jugular Non - valved (open ended);Tunneled;Power injectable-Site Assessment: WDL      Cardiac Monitoring: None  Code Status: Full Code      Clinically Significant Risk Factors              # Hypoalbuminemia: Lowest albumin = 2.2 g/dL at 7/8/2023  8:51 AM, will monitor as appropriate    # Acute Kidney Injury, unspecified: based on a >150% or 0.3 mg/dL increase in last creatinine compared to past 90 day average, will monitor renal function         # Severe Obesity: Estimated body mass index is 41.81 kg/m  as calculated from the following:    Height as of this encounter: 1.778 m (5' 10\").    Weight as of this encounter: 132.2 kg (291 lb 6.4 oz).   # Severe Malnutrition: based on nutrition assessment         Disposition Plan           Jillian Alamo MD  Hospitalist Service, GOLD TEAM 21  [facility]  Securely message with [employer] (more info)  Text page via [facility] Paging/Directory   See signed in provider for up to date coverage information  ______________________________________________________________________    Interval History   No acute events overnight. Does not have significant memory of the conversation yesterday. Good appetite.     Physical Exam   Vital Signs: Temp: 98.2  F (36.8  C) Temp src: Oral BP: (!) 143/73 Pulse: 80   Resp: 16 SpO2: 95 % O2 Device: None (Room air)    Weight: 291 lbs 6.4 oz    General Appearance: NAD. Lying comfortably in bed.  Respiratory: CTAB. No increased WOB.  Cardiovascular: RRR. No m/r/g  GI: Soft. Non-tender. Non-distended.  Skin: No rash.  Other: Moving all extremities. Flat affect and withdrawn. Intermittent " twitching/myoclonus.    Medical Decision Making       35 MINUTES SPENT BY ME on the date of service doing chart review, history, exam, documentation & further activities per the note.      Data     I have personally reviewed the following data over the past 24 hrs:    N/A  \   N/A   / N/A     144 106 57.0 (H) /  126 (H)   4.1 23 5.71 (H) \       ALT: N/A AST: N/A AP: N/A TBILI: N/A   ALB: 3.0 (L) TOT PROTEIN: N/A LIPASE: N/A

## 2023-07-23 NOTE — CARE PLAN
"Patient is refusing meals and cares. He doesn't want to order any food. He looks agitated and confused. He said. \"there is nothing in the menu that he wants.\" Following his previous order history, food was offered in the room and he refused. In addition,patient was cleaned this shift, and he had a loose stool. After clean up patient refused to return supine, and he remained turning on the left incision site. Teaching provided but patient insisted on refusing. Provider notified.  Paged:  SAURABH: Patient refused meals and cares (lunch and dinner). Teaching provided, but refused. Loose stool cleaned up, but refused to go supine, lied on left incisional leg. He appears agitated and confused. Gwen #[id number]  "

## 2023-07-23 NOTE — PROGRESS NOTES
VS: Temp: 98.3  F (36.8  C) Temp src: Oral BP: (!) 151/61 Pulse: 103   Resp: 18 SpO2: 97 % O2 Device: None (Room air)       O2: On room air, denies SOB, denies chest pain, no cough noted   Output: Incontinent, voids spontaneously   Last BM: 7/20   Activity: Bedfast- lift with 2 assist   Skin: Redness to bottom, healed incision to left hip, reddened yeast bilateral groins   Pain: 9/10- oxycodone and tylenol utilized   CMS: Alert to himself,confused,twitchign episodes   Dressing: ABD pad to left hip area   Diet: REG   LDA: CVC to right chest area, patent, heparin locked   Equipment: Personal belongings   Plan: TBD   Additional Info:

## 2023-07-23 NOTE — PLAN OF CARE
Goal Outcome Evaluation: Ongoing  Focus: Physio  Alert and orientated times four. Skin excoriated in between groin, thighs, buttocks, and coccyx/sacrum area. Barrier cream applied. Red areas in abdominal folds. Nystatin powder applied. Dressing changed to both areas on left heel. One area with black eschar noted. Pt. Is currently up in chair using liko lift. Oxycodone for left heel and leg pain. P: Continue current plan of care.

## 2023-07-23 NOTE — PROGRESS NOTES
"  Nephrology Progress Note  07/23/2023         Assessment & Recommendations:     HALEY stage II  Recurrent HALEY in setting of significant hemodynamic changes including diuretics and Vancomycin use   Since his admission, have had multiple HALEY episodes, recently a week ago when he was given diuretics for volume management among others including continue use of vancomycin (level was slightly high on 7/13). Now his creatinine continue to trend up. Also noted pt continued to be altered intermittently. Question about uremic syndrome raised which can not be ruled out at this point with continued renal dysfunction for quite some time.  His renal function is likely stabilizing in the past 24 hours where his eGFR is about 10 ml with stable electrolytes.    Please refer to note of Megan Kessler on 7/21/23 for care conference and decision about HD.    - no acute indication for HD today based on labs or exam   - hold diuresis as you are doing now  - plan for IR to place perm cath on Monday as there is likely chance he might require long term dialysis.    Recommendations were communicated to primary team via this note    Zayda Lucero MD   Division of Renal Disease and Hypertension  [facility]  [email or web address]  Vocera Web Console    Interval History :   Nursing and provider notes from last 24 hours reviewed.  In the last 24 hours Waldo Rodriguezy been hemodynamically stable. No new symptoms provided today.  Review of Systems:   Was unable to review completely as he was altered and also not interested in answering questions.    Physical Exam:   No intake/output data recorded.   BP (!) 145/67 (BP Location: Left arm)   Pulse 84   Temp 97.8  F (36.6  C) (Oral)   Resp 16   Ht 1.778 m (5' 10\")   Wt 132.2 kg (291 lb 6.4 oz)   SpO2 98%   BMI 41.81 kg/m       General : Pt awake, not in acute distress   Lungs : anterior lung fields are coarse  Cardiac : S1, S2 present  Abdomen : Soft/ND/NT  LE : Edema noted  Dialysis Access : N/A    Labs: "   All labs reviewed by me  Electrolytes/Renal -   Recent Labs   Lab Test 07/23/23  0825 07/23/23  0807 07/23/23  0731 07/22/23  0827 07/22/23  0747 07/21/23  1204 07/21/23  1125 07/12/23  1209 07/12/23  0911 07/05/23  0823 07/05/23  0700 07/02/23  1754 07/02/23  1149     --   --   --  144  --  143   < > 143   < > 142   < > 137   POTASSIUM 4.1  --   --   --  4.1  --  3.9   < > 4.1   < > 4.5   < > 4.3   CHLORIDE 104  --   --   --  106  --  105   < > 112*   < > 108*   < > 107   CO2 22  --   --   --  23  --  24   < > 19*   < > 16*   < > 21*   BUN 57.4*  --   --   --  57.0*  --  53.6*   < > 43.6*   < > 46.3*   < > 39.1*   CR 5.80*  --   --   --  5.71*  --  5.34*   < > 4.44*   < > 3.30*   < > 2.20*   * 117* 99   < > 127*   < > 129*   < > 105*   < > 99   < > 128*   MAIKOL 8.9  --   --   --  8.9  --  8.6*   < > 8.3*   < > 7.7*   < > 7.9*   MAG  --   --   --   --   --   --   --   --  1.6*  --  1.7  --  1.7   PHOS 6.3*  --   --   --  6.2*  --  5.8*   < > 5.0*   < >  --   --   --     < > = values in this interval not displayed.       CBC -   Recent Labs   Lab Test 07/20/23  0538 07/11/23  0656 07/10/23  1316   WBC 8.4 8.5 9.0   HGB 8.0* 8.1* 8.2*    262 269       LFTs -   Recent Labs   Lab Test 07/23/23  0825 07/22/23  0747 07/21/23  1125 07/06/23  1708 07/06/23  0713 07/04/23  0627 06/22/23  0711 06/20/23  0704   ALKPHOS  --   --   --   --  103 99  --  93   BILITOTAL  --   --   --   --  <0.2 0.2  --  0.2   ALT  --   --   --   --  12 14  --  14   AST  --   --   --   --  9 11  --  14   PROTTOTAL  --   --   --   --  5.3* 5.9*  --  5.9*   ALBUMIN 3.2* 3.0* 3.0*   < > 2.4*  2.4* 2.6*   < > 2.3*    < > = values in this interval not displayed.       Iron Panel -   Recent Labs   Lab Test 06/26/23  0513   IRON 30*   IRONSAT 21   FADI 2,616*       Current Medications:    acetaminophen  975 mg Oral Q8H     amLODIPine  5 mg Oral Daily     [Held by provider] apixaban ANTICOAGULANT  2.5 mg Oral BID     atorvastatin  40 mg  Oral Daily     cefTAZidime  2 g Intravenous Q24H     ferrous sulfate  325 mg Oral Every Other Day     folic acid  1 mg Oral Daily     heparin ANTICOAGULANT  5,000 Units Subcutaneous Q12H     heparin lock flush  5-20 mL Intracatheter Q24H     insulin aspart  1-7 Units Subcutaneous TID AC     insulin aspart  1-5 Units Subcutaneous At Bedtime     lactobacillus rhamnosus (GG)  1 capsule Oral BID     miconazole   Topical BID     multivitamin w/minerals  1 tablet Oral Daily     senna-docusate  1 tablet Oral BID     sodium chloride (PF)  10 mL Intracatheter Q8H     sodium chloride (PF)  10-40 mL Intracatheter Q8H     vancomycin place montilla - receiving intermittent dosing  1 each Intravenous See Admin Instructions     cholecalciferol  50 mcg Oral Daily       Zayda Lucero MD

## 2023-07-23 NOTE — PROGRESS NOTES
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  Postop course complicated by profound deconditioning.  He was admitted to TCU initially on 12/23/2022 for physical rehabilitation, however there was no progress with therapies after several months of in bed therapy and his refusals to mobilize. Underwent biopsy on 4/7/2023 without evidence of infection.  He was transferred back to [facility] for left total hip arthroplasty and reimplantation on 5/26/23 with Dr. Meyers.  Presented again to [place] for persistent MRSA bacteremia on 6/17.     Brief Hospital Summary:    Pt was admitted to continue infectious workup with MRSA bacteremia.  Admission blood cultures were positive, ID and Ortho were consulted. KEILA was obtained on 6/19, which was negative for endocarditis, ortho took for washout/debridement of hip on 6/21. ID recommending 6 weeks IV abx (6/21-8/1) via tunneled internal jugular line (nephrology asked to defer PICC) followed by 6 weeks of PO cipro/doxy. He has developed persistent encephalopathy that is felt to be multifactorial. Neurology consulted and there was concern for contribution of cefepime neurotoxicity. EEG was negative for seizures and CT head negative. He did have some mild improvement after stopping cefepime, but has not returned to baseline. Complicating the situation further is that he has developed worsening acute renal failure with now concern for uremia contributing to his mental status. Care conference on 7/21 with brother Ivan  as surrogate decision maker with plan for short trial of dialysis to determine if pt has any meaningful improvement.    Changes Today:  - NPO at midnight for dialysis catheter placement 7/24 with IR  - Hold PM dose of heparin  - Suppository for constipation    Goals of Care  Complex medical course over the past 6+ months. Was in [place] [facility] since 12/2022 due to profound deconditioning, but ultimately refused to participate meaningfully in therapies. Was readmitted in 6/23 with MRSA bacteremia after repeat hip surgery in 4/23. His hospital course has been complicated by acute renal failure, multifactorial encephalopathy and apathy/refusal to participate in therapies or medical cares. He was assessed by psychiatry on 7/18 and felt to be non-decisional regarding complex medical decisions or disposition. His brother (Ivan) is his legal POA, but Don does not have a healthcare directive. Care conference held on 7/21 with medicine, palliative, renal, SW, RN manager - discussed the above and options including a short trial (~2 weeks) of dialysis to see if it improves his mental function and willingness/ability to participate in therapies vs moving towards a comfort-based approach. Ultimately, Ivan decided to move forward with the trial of dialysis and a plan to reassess in ~2 weeks to determine next steps. We discussed that if he does not meaningfully improve, outpatient dialysis would likely not be an option due to his profound immobility.  - Plan to move forward with short trial of dialysis.  - Will monitor for improvement with a plan to reassess after dialysis trial to make next decisions regarding goals of care.  - Continue full code for now. Ivan to discuss more with his brothers.    Multifactorial encephalopathy - likely toxic metabolic, uremic, delirium, depression contributing  Noted to have acutely worsening mental status around ~7/4, which was initially felt to be related to cefepime neurotoxicity. Neurology  consulted. EEG without seizures. CT head, ammonia and VBG wnl. Completed 5 days of antibiotics. No clinical or objective findings concerning for worsening infection. Does have worsening renal failure with rising BUN, so some consideration that uremia could be contributing, but overall not hugely elevated. Also concern that depression is contributing.  - Delirium precautions  - Nephrology following  - After care conference on 7/21, will plan for a short trial of inpatient dialysis starting early next week to see if it improves his mental status  - Psychiatry consulted on 7/18  for capacity assessment - pt not decisional regarding complex medical decisions or disposition.    MRSA bacteremia due to possible left hip septic arthritis vs other source Blood cultures: 6/14, 6/15 + for MRSA (4/4), and 6/16 (1/2 GPCs) and 6/17 (2/2) GPCs. - started vancomycin 6/15.   6/17 sinovial fluid cultures with staph and klebsiella. Purulent drainage noted at incision site at TCU, CT left hip concerning for infection (no mention of joint, mostly anterior in soft tissue) and couldn't exclude abscess/inf  - ID intermittently following through hospital course. Last seen 7/21.  - Continue ceftazidime, vancomycin for C.acnes, Pseudomonas, MRSA PJI until 8/1/2023  - Start oral doxycycline and ciprofloxacin on 8/2/2023 for an additional 6 weeks of therapy (total duration = 12 weeks)    Acute Kidney Injury - multifactorial, see below  Nephrology consulted, appreciate recs. Baseline creatinine 1.0 or 1.1. Etiology: suspect combo of sepsis/inflammation, hypovolemia, blood loss anemia following surgery, vancomycin. UA recollected on 7/19 with granular casts suggesting ongoing dense ATN. Creatinine continuing to uptrend, however no acidosis, electrolyte abnormalities or evidence of hypervolemia. Some concern that uremia could be contributing to his encephalopathy. After GOC on 7/21, will plan to pursue short trial of dialysis.  - Nephrology  consulted  - Planning to move forward with short trial of dialysis.   - See above for GOC converation.    Asymptomatic bacteruria (VRE)  UA obtained on 7/19 to assess for casts, reflexed to culture which is growing VRE. Labs and vitals with low concern for new infection. Discussed with ID who recommended against treatment.    Bilateral LE edema due to volume overload from HALEY and iatrogenic from blood transfusions  Hyperesthesia of bilateral lower extremities (no signs of cellulitis as of 7/13)  - Holding diuresis starting 7/20    L heel pressure wound, unstagable  Noted earlier in pts hospitalization (~6/20), but at that time he refused assessment. Noted again by nursing to be worsening on 7/19 and WOCN consult was placed. Pt was agreeable and had wound assessed. He has been mostly non-agreeable to offloading or other preventative measures recommended by RN and WOCN, but will sometimes allow it with significant encouragement.  - WOCN consult  - Continue to encourage pt to utilize offloading techniques and to participate in wound cares.    Hypernatremia - resolved  Uptrending recently, 147 on 7/18. Likely volume down. S/p D5W on 7/18, resolved.  - Monitor. Holding diuretics.    Hypoxemic Respiratory Failure due to immobility, surgery - resolved  - continue to follow; encourage IS    Acute blood loss anemia following surgery, complicated by use of blood thinner  - 3 units day after surgery did joint washout (6/22) for acute blood loss anemia, 1 unit 6/25  - watch for bleeding; hold apixaban if need be - had unprovoked dvt in 2018 and would be on eliquis for life, high risk of recurrent DVT given recent procedures and illness/immobility  - iron and folic acid daily    S/p left hip explantation of left hip antibiotic spacer and revision of left total hip arthroplasty (5/26/2023)  - ortho performed washout 6/21  -Activity: 50% PWB LLE with posterior hip precautions [id number]  -AFO ordered for foot drop but patient  refusing  -PT/OT  -Knee to be kept even in a foam leg elevator to relax sciatic nerve  -Pain: holding robaxin 4 times a day with mental status changes (discontinuing), APAP PRN  -stop tramadol (7/11) due to renal function  -increase oxycodone 2.5-5 mg q6h PRN- taking very minimal amts so unlikely to be contributing to mental status    Hx of unprovoked DVT in 2018   - Continue heparin subcutaneous BID pending line placement on 7/24  - Hold PTA apixiban     Type 2 diabetes mellitus  - Last hemoglobin A1c 5.6% on 6/16/2023.  On glipizide, metformin, Actos, and Victoza prior to hospitalizations and surgery.   - continue ISS    Constipation, improved  -Continue Colace 100 mg twice daily  -Continue senna 1 tablet twice daily-- had briefly increased to 2 tab twice daily due to lack of stool, now improved  -Continue MiraLAX daily     Hypomagnesemia  -monitor and replete      Severe malnutrition in the context of acute on chronic illness  Goal for patient is to consume % of meals TID. Goal is 0026-5333 kcals/daily. Protein needs- 100-126 grams/daily.   -RD consult and appreciate recs  -regular diet  -Weekly weights, daily I/O     Adjustment disorder with depressed mood  Prolonged grief disorder  Patient has been with depressed mood in setting of ongoing health issues, as evidenced by lack of motivation to work with therapies during both TCU admissions.  Past history of passive suicidal statements. Psychiatry saw patient during hospital admission on 5/31 due to lack of motivation with therapies. Patient declined interest in starting any psychiatric medications. Was willing to engage with health psychology.   -Outpatient health psychology consult  -Continue to monitor for willingness to start antidepressant therapy  -tried to discuss mood 7/15 but he didn't fully answer questions, seemed to talk about different aspect of his health that wasn't quite related to mood     HTN:   - Started amlodipine 7/16    Stable and  "Resolved Issues:  Hyperlipidemia-continue PTA Lipitor 40 mg daily  JAIR-CPAP when sleeping             Diet: Renal Diet (non-dialysis)  Room Service  NPO per Anesthesia Guidelines for Procedure/Surgery Except for: Meds    DVT Prophylaxis: Heparin SQ  Tran Catheter: Not present  Lines: PRESENT      CVC Single Lumen Right Internal jugular Non - valved (open ended);Tunneled;Power injectable-Site Assessment: WDL      Cardiac Monitoring: None  Code Status: Full Code      Clinically Significant Risk Factors              # Hypoalbuminemia: Lowest albumin = 2.2 g/dL at 7/8/2023  8:51 AM, will monitor as appropriate    # Acute Kidney Injury, unspecified: based on a >150% or 0.3 mg/dL increase in last creatinine compared to past 90 day average, will monitor renal function         # Severe Obesity: Estimated body mass index is 41.81 kg/m  as calculated from the following:    Height as of this encounter: 1.778 m (5' 10\").    Weight as of this encounter: 132.2 kg (291 lb 6.4 oz).   # Severe Malnutrition: based on nutrition assessment         Disposition Plan           Jillian Alamo MD  Hospitalist Service, GOLD TEAM 21  [facility]  Securely message with [employer] (more info)  Text page via [facility] Paging/Directory   See signed in provider for up to date coverage information  ______________________________________________________________________    Interval History   No acute events overnight. Feeling ok this morning. Having some constipation. Ok with plan for dialysis catheter tomorrow.    Physical Exam   Vital Signs: Temp: 98.2  F (36.8  C) Temp src: Oral BP: 131/73 Pulse: 80   Resp: 18 SpO2: 99 % O2 Device: None (Room air)    Weight: 291 lbs 6.4 oz    General Appearance: NAD. Lying comfortably in bed.  Respiratory: CTAB. No increased WOB.  Cardiovascular: RRR. No m/r/g  GI: Soft. Non-tender. Non-distended.  Skin: No rash.  Other: Moving all extremities. Normal affect. Intermittent " myoclonus.    Medical Decision Making       35 MINUTES SPENT BY ME on the date of service doing chart review, history, exam, documentation & further activities per the note.  MANAGEMENT DISCUSSED with the following over the past 24 hours: Nephrology       Data     I have personally reviewed the following data over the past 24 hrs:    N/A  \   N/A   / N/A     143 104 57.4 (H) /  118 (H)   4.1 22 5.80 (H) \       ALT: N/A AST: N/A AP: N/A TBILI: N/A   ALB: 3.2 (L) TOT PROTEIN: N/A LIPASE: N/A

## 2023-07-23 NOTE — CARE PLAN
"    VS: BP (!) 151/61 (BP Location: Left arm)   Pulse 103   Temp 98.3  F (36.8  C) (Oral)   Resp 18   Ht 1.778 m (5' 10\")   Wt 132.2 kg (291 lb 6.4 oz)   SpO2 97%   BMI 41.81 kg/m        O2: RA, denies SOB and CP.   Output: Incontinent of bowel & bladder. Briefs changed this shift.    Last BM: 07/22/2023   Activity: Ax3 for cares.    Skin: LLE surgical incision, blanchable redness to caridad area and buttocks.   Pain: Managed with Tylenol.   Neuro: A & O x3, pt was highl agitate and angry this shift.   Dressing: CDI   Diet: Regular diet. BG checks. No novolog given this shift.    LDA: R CVC HL   Equipment: Personal belongings, call light, bedpan, IV pump/pole, pulsate mattress.   Plan: Continue with plan of care, waiting on placement.   Additional Info: Patient was getting confused (see notes) so he was refusing cares and meals.       "

## 2023-07-24 NOTE — PROGRESS NOTES
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  Postop course complicated by profound deconditioning.  He was admitted to TCU initially on 12/23/2022 for physical rehabilitation, however there was no progress with therapies after several months of in bed therapy and his refusals to mobilize. Underwent biopsy on 4/7/2023 without evidence of infection.  He was transferred back to [facility] for left total hip arthroplasty and reimplantation on 5/26/23 with Dr. Meyers.  Presented again to [facility] for persistent MRSA bacteremia on 6/17.     Brief Hospital Summary:    Pt was admitted to continue infectious workup with MRSA bacteremia.  Admission blood cultures were positive, ID and Ortho were consulted. KEILA was obtained on 6/19, which was negative for endocarditis, ortho took for washout/debridement of hip on 6/21. ID recommending 6 weeks IV abx (6/21-8/1) via tunneled internal jugular line (nephrology asked to defer PICC) followed by 6 weeks of PO cipro/doxy. He has developed persistent encephalopathy that is felt to be multifactorial. Neurology consulted and there was concern for contribution of cefepime neurotoxicity. EEG was negative for seizures and CT head negative. He did have some mild improvement after stopping cefepime, but has not returned to baseline. Complicating the situation further is that he has developed worsening acute renal failure with now concern for uremia contributing to his mental status. Care conference on 7/21 with brother Ivan  as surrogate decision maker with plan for short trial of dialysis to determine if pt has any meaningful improvement.    Changes Today:  - Discussed with nephrology - with stable/downtrending BUN and possible plateau of Cr, they would like to hold off on line placement and dialysis initiation today. Will monitor renal function over next 1-2 days and discuss again if not improving.    Goals of Care  Complex medical course over the past 6+ months. [facility], but ultimately refused to participate meaningfully in therapies. Was readmitted in 6/23 with MRSA bacteremia after repeat hip surgery in 4/23. His hospital course has been complicated by acute renal failure, multifactorial encephalopathy and apathy/refusal to participate in therapies or medical cares. He was assessed by psychiatry on 7/18 and felt to be non-decisional regarding complex medical decisions or disposition. His brother (Ivan) is his legal POA, but Don does not have a healthcare directive. Care conference held on 7/21 with medicine, palliative, renal, SW, RN manager - discussed the above and options including a short trial (~2 weeks) of dialysis to see if it improves his mental function and willingness/ability to participate in therapies vs moving towards a comfort-based approach. Ultimately, Ivan decided to move forward with the trial of dialysis and a plan to reassess in ~2 weeks to determine next steps. We discussed that if he does not meaningfully improve, outpatient dialysis would likely not be an option due to his profound immobility.  - Plan to move forward with short trial of dialysis.  - Will monitor for improvement with a plan to reassess after dialysis trial to make next decisions regarding goals of care.  - Continue full code for now. Ivan to discuss more with his brothers.    Multifactorial encephalopathy - likely toxic metabolic, uremic, delirium, depression contributing  Noted to  have acutely worsening mental status around ~7/4, which was initially felt to be related to cefepime neurotoxicity. Neurology consulted. EEG without seizures. CT head, ammonia and VBG wnl. Completed 5 days of antibiotics. No clinical or objective findings concerning for worsening infection. Does have worsening renal failure with rising BUN, so some consideration that uremia could be contributing, but overall not hugely elevated. Also concern that depression is contributing.  - Delirium precautions  - Nephrology following  - After care conference on 7/21, will plan for a short trial of inpatient dialysis starting early next week to see if it improves his mental status  - Psychiatry consulted on 7/18  for capacity assessment - pt not decisional regarding complex medical decisions or disposition.    MRSA bacteremia due to possible left hip septic arthritis vs other source Blood cultures: 6/14, 6/15 + for MRSA (4/4), and 6/16 (1/2 GPCs) and 6/17 (2/2) GPCs. - started vancomycin 6/15.   6/17 sinovial fluid cultures with staph and klebsiella. Purulent drainage noted at incision site at TCU, CT left hip concerning for infection (no mention of joint, mostly anterior in soft tissue) and couldn't exclude abscess/inf  - ID intermittently following through hospital course. Last seen 7/21.  - Continue ceftazidime, vancomycin for C.acnes, Pseudomonas, MRSA PJI until 8/1/2023  - Start oral doxycycline and ciprofloxacin on 8/2/2023 for an additional 6 weeks of therapy (total duration = 12 weeks)    Acute Kidney Injury - multifactorial, see below  Nephrology consulted, appreciate recs. Baseline creatinine 1.0 or 1.1. Etiology: suspect combo of sepsis/inflammation, hypovolemia, blood loss anemia following surgery, vancomycin. UA recollected on 7/19 with granular casts suggesting ongoing dense ATN. Creatinine continuing to uptrend, however no acidosis, electrolyte abnormalities or evidence of hypervolemia. Some concern that uremia  could be contributing to his encephalopathy. After GOC on 7/21, will plan to pursue short trial of dialysis.  - Nephrology consulted  - Planning to move forward with short trial of dialysis.   - See above for GOC converation.    Asymptomatic bacteruria (VRE)  UA obtained on 7/19 to assess for casts, reflexed to culture which is growing VRE. Labs and vitals with low concern for new infection. Discussed with ID who recommended against treatment.    Bilateral LE edema due to volume overload from HALEY and iatrogenic from blood transfusions  Hyperesthesia of bilateral lower extremities (no signs of cellulitis as of 7/13)  - Holding diuresis starting 7/20    L heel pressure wound, unstagable  Noted earlier in pts hospitalization (~6/20), but at that time he refused assessment. Noted again by nursing to be worsening on 7/19 and WOCN consult was placed. Pt was agreeable and had wound assessed. He has been mostly non-agreeable to offloading or other preventative measures recommended by RN and WOCN, but will sometimes allow it with significant encouragement.  - WOCN consult  - Continue to encourage pt to utilize offloading techniques and to participate in wound cares.    Hypernatremia - resolved  Uptrending recently, 147 on 7/18. Likely volume down. S/p D5W on 7/18, resolved.  - Monitor. Holding diuretics.    Hypoxemic Respiratory Failure due to immobility, surgery - resolved  - continue to follow; encourage IS    Acute blood loss anemia following surgery, complicated by use of blood thinner  - 3 units day after surgery did joint washout (6/22) for acute blood loss anemia, 1 unit 6/25  - watch for bleeding; hold apixaban if need be - had unprovoked dvt in 2018 and would be on eliquis for life, high risk of recurrent DVT given recent procedures and illness/immobility  - iron and folic acid daily    S/p left hip explantation of left hip antibiotic spacer and revision of left total hip arthroplasty (5/26/2023)  - ortho performed  washout 6/21  -Activity: 50% PWB LLE with posterior hip precautions [id number]  -AFO ordered for foot drop but patient refusing  -PT/OT  -Knee to be kept even in a foam leg elevator to relax sciatic nerve  -Pain: holding robaxin 4 times a day with mental status changes (discontinuing), APAP PRN  -stop tramadol (7/11) due to renal function  -increase oxycodone 2.5-5 mg q6h PRN- taking very minimal amts so unlikely to be contributing to mental status    Hx of unprovoked DVT in 2018   - Continue heparin subcutaneous BID pending line placement on 7/24  - Hold PTA apixiban     Type 2 diabetes mellitus  - Last hemoglobin A1c 5.6% on 6/16/2023.  On glipizide, metformin, Actos, and Victoza prior to hospitalizations and surgery.   - continue ISS    Constipation, improved  -Continue Colace 100 mg twice daily  -Continue senna 1 tablet twice daily-- had briefly increased to 2 tab twice daily due to lack of stool, now improved  -Continue MiraLAX daily     Hypomagnesemia  -monitor and replete      Severe malnutrition in the context of acute on chronic illness  Goal for patient is to consume % of meals TID. Goal is 0830-5062 kcals/daily. Protein needs- 100-126 grams/daily.   -RD consult and appreciate recs  -regular diet  -Weekly weights, daily I/O     Adjustment disorder with depressed mood  Prolonged grief disorder  Patient has been with depressed mood in setting of ongoing health issues, as evidenced by lack of motivation to work with therapies during both TCU admissions.  Past history of passive suicidal statements. Psychiatry saw patient during hospital admission on 5/31 due to lack of motivation with therapies. Patient declined interest in starting any psychiatric medications. Was willing to engage with health psychology.   -Outpatient health psychology consult  -Continue to monitor for willingness to start antidepressant therapy  -tried to discuss mood 7/15 but he didn't fully answer questions, seemed to talk about  "different aspect of his health that wasn't quite related to mood     HTN:   - Started amlodipine 7/16    Stable and Resolved Issues:  Hyperlipidemia-continue PTA Lipitor 40 mg daily  JAIR-CPAP when sleeping       Diet: Room Service  NPO per Anesthesia Guidelines for Procedure/Surgery Except for: Meds    DVT Prophylaxis: Heparin SQ  Tran Catheter: Not present  Lines: PRESENT      CVC Single Lumen Right Internal jugular Non - valved (open ended);Tunneled;Power injectable-Site Assessment: WDL      Cardiac Monitoring: None  Code Status: Full Code      Clinically Significant Risk Factors              # Hypoalbuminemia: Lowest albumin = 2.2 g/dL at 7/8/2023  8:51 AM, will monitor as appropriate    # Acute Kidney Injury, unspecified: based on a >150% or 0.3 mg/dL increase in last creatinine compared to past 90 day average, will monitor renal function         # Severe Obesity: Estimated body mass index is 41.81 kg/m  as calculated from the following:    Height as of this encounter: 1.778 m (5' 10\").    Weight as of this encounter: 132.2 kg (291 lb 6.4 oz).   # Severe Malnutrition: based on nutrition assessment         Disposition Plan           Jillian Alamo MD  Hospitalist Service, GOLD TEAM 21  [facility]  Securely message with [employer] (more info)  Text page via [employer] Paging/Directory   See signed in provider for up to date coverage information  ______________________________________________________________________    Interval History   No acute events overnight. Resting this morning. Endorsing pain \"all over\".     Physical Exam   Vital Signs: Temp: 98.1  F (36.7  C) Temp src: Oral BP: 130/67 Pulse: 79   Resp: 18 SpO2: 94 % O2 Device: None (Room air)    Weight: 291 lbs 6.4 oz    General Appearance:  NAD. Lying comfortably in bed.  Respiratory: CTAB. No increased WOB.  Cardiovascular: RRR. No m/r/g  GI: Soft. Non-tender. Non-distended.  Skin: No rash.  Other:  Moving all " extremities. Normal affect. Intermittent myoclonus.    Medical Decision Making       35 MINUTES SPENT BY ME on the date of service doing chart review, history, exam, documentation & further activities per the note.  MANAGEMENT DISCUSSED with the following over the past 24 hours: Renal       Data     I have personally reviewed the following data over the past 24 hrs:    N/A  \   N/A   / N/A     143 106 56.9 (H) /  109 (H)   3.8 22 5.88 (H) \       ALT: N/A AST: N/A AP: N/A TBILI: N/A   ALB: 3.3 (L) TOT PROTEIN: N/A LIPASE: N/A

## 2023-07-24 NOTE — PROGRESS NOTES
IR called that they were ready for him to come down on a cart. They called back and said place transport on hold as providers were discussing if he needed the procedure. Waiting for a further update.

## 2023-07-24 NOTE — PROGRESS NOTES
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  Postop course complicated by profound deconditioning.  He was admitted to TCU initially on 12/23/2022 for physical rehabilitation, however therapies no longer worked with patient after several months of an in bed therapy and his refusals to mobilize.  Underwent biopsy on 4/7/2023 without evidence of infection.  He was transferred back to [facility] for left total hip arthroplasty and reimplantation on 5/26/23 with Dr. Meyers.  Presented again to [facility] for persistent MRSA bacteremia on 6/17. His course has been c/b HALEY. Baseline creat 1.0 as recent as 6/19/23).        HALEY stage II  Recurrent HALEY in setting of significant hemodynamic changes including diuretics and Vancomycin use   Since his admission, have had multiple HALEY episodes, recently a week ago when he was given diuretics for volume management among others including continue use of vancomycin (level was slightly high on 7/13). Now his creatinine continue to trend up. Also noted pt continued to be altered intermittently. Question about uremic syndrome raised which can not be ruled out at this point with continued renal dysfunction for quite some time.  His renal function is likely stabilizing in the past 24 hours where his eGFR is about 10 ml with stable electrolytes.    Please refer to note of Megan Kessler on 7/21/23 for care conference and decision about HD. See part of the note below:     \" - We discussed that currently Rahat would not meet OP HD criteria given that he is a transfer of 3 and would likely require a bed. The only hope at this " "time of any improvement in his cognitive function, likely a very small chance, is that with dialysis any contribution of uremia may improve and allow him to improve his cognition and either be clear about wanting dialysis and or become more engaged in therapies so that he could be a candidate for OP HD.     - The decision by brother Iavn was to go ahead with a 2 wk trial of HD and if at the end of the 2 wks no change in his cognitive status, discontinue HD. If Don improves on HD then he would ultimately remain inpatient until he was strong enough to meet OP HD criteria.     - Will plan on initiating HD on Monday. IR orders have been placed. Primary team discontinuing Apixiban and switching to Heparin   - Continue to monitor for renal recovery with daily chemistry labs/strict I/O and daily weights\"    - continue holding diuresis   - cancelled IR placement of dialysis catheter for now  - will monitor closely for next 24-48 hours for further recovery     Volume status - No edema/dyspnea/hypoxia. Bp 160'/. Intake 240 ml, Output 550 ml ( using a urinal but also incontinent).    - Furosemide 40 mg every day was discontinued 7/20/23 w/o improvement    - May need to restart a lower dose tomorrow   - Please weigh daily. Zero bed    - Continue I/O     3. HTN - Borderline control in HALEY. Bp 160's/. No edema  Current regimen: Amlodipine 5 mg every day    - Avoid hypotension. B/p ok at this time   - continue holding diuretic     4. MRSA bacteremia- Will be completing 6 wks of Vancomycin on 8/1/23. Continue Fortaz.    - UC with VRE but only 10-50 k. No treatment required   - Per ID     5. Electrolytes - No acute issues today. Na 143, K 3.8   - Given recent hypernatremia continue to encourage fluid intake     6 Acid base - No acute concerns. Bicarb 22     7. BMD - Ca 8.7 Phos 5.9, improved from 6.3 albumin 3.0 ( improved)   - Malnutrition present: Getting MVI. Would benefit from dietary supplement   - No need for Phos binder at " "this time   - Vit D 16   - Began D3 - 50 mcq every day 7/18   - Switched to Renal diet 7/19     8. Anemia - last Hgb 8.0   - Etiology acute illness   - On Iron, folic acid   - Iron studies 6/26/23: Ferritin 2616, Fe 30 IS 21   - Does have DVT history on Eliquis - switched to heparin   - Ferritin too high for IV iron   - Transfusions per primary team      Recommendations were communicated to primary team verbally.     Seen and discussed with Dr. Bryan.     OPAL MAYER PADeeptiC   Division of Renal Disease and Hypertension  [facility]  tomasa  Vocera Web Console    Interval History :   Nursing and provider notes from last 24 hours reviewed.  In the last 24 hours Waldo Rodrgiuezy been hemodynamically stable. No new symptoms provided today. Per patient, he is making more urine. He has no n/v/d. No shortness of breath.    Review of Systems:   As noted above in interval history    Physical Exam:   I/O last 3 completed shifts:  In: 240 [P.O.:240]  Out: 200 [Urine:200]   BP (!) 160/82 (BP Location: Left arm)   Pulse 82   Temp 97.6  F (36.4  C) (Oral)   Resp 16   Ht 1.778 m (5' 10\")   Wt 132.2 kg (291 lb 6.4 oz)   SpO2 96%   BMI 41.81 kg/m       General : Pt awake, not in acute distress   Lungs : no respiratory distress  Cardiac : RRR  LE :trace Edema noted  Dialysis Access : N/A    Labs:   All labs reviewed by me  Electrolytes/Renal -   Recent Labs   Lab Test 07/24/23  0816 07/24/23  0507 07/24/23  0316 07/23/23  1138 07/23/23  0825 07/22/23  0827 07/22/23  0747 07/12/23  1209 07/12/23  0911 07/05/23  0823 07/05/23  0700 07/02/23  1754 07/02/23  1149   NA  --  143  --   --  143  --  144   < > 143   < > 142   < > 137   POTASSIUM  --  3.8  --   --  4.1  --  4.1   < > 4.1   < > 4.5   < > 4.3   CHLORIDE  --  106  --   --  104  --  106   < > 112*   < > 108*   < > 107   CO2  --  22  --   --  22  --  23   < > 19*   < > 16*   < > 21*   BUN  --  56.9*  --   --  57.4*  --  57.0*   < > 43.6*   < > 46.3*   < > 39.1*   CR  --  " 5.88*  --   --  5.80*  --  5.71*   < > 4.44*   < > 3.30*   < > 2.20*   * 125* 126*   < > 118*   < > 127*   < > 105*   < > 99   < > 128*   MAIKOL  --  8.7*  --   --  8.9  --  8.9   < > 8.3*   < > 7.7*   < > 7.9*   MAG  --   --   --   --   --   --   --   --  1.6*  --  1.7  --  1.7   PHOS  --  5.9*  --   --  6.3*  --  6.2*   < > 5.0*   < >  --   --   --     < > = values in this interval not displayed.       CBC -   Recent Labs   Lab Test 07/20/23  0538 07/11/23  0656 07/10/23  1316   WBC 8.4 8.5 9.0   HGB 8.0* 8.1* 8.2*    262 269       LFTs -   Recent Labs   Lab Test 07/24/23  0507 07/23/23  0825 07/22/23  0747 07/06/23  1708 07/06/23  0713 07/04/23  0627 06/22/23  0711 06/20/23  0704   ALKPHOS  --   --   --   --  103 99  --  93   BILITOTAL  --   --   --   --  <0.2 0.2  --  0.2   ALT  --   --   --   --  12 14  --  14   AST  --   --   --   --  9 11  --  14   PROTTOTAL  --   --   --   --  5.3* 5.9*  --  5.9*   ALBUMIN 3.3* 3.2* 3.0*   < > 2.4*  2.4* 2.6*   < > 2.3*    < > = values in this interval not displayed.       Iron Panel -   Recent Labs   Lab Test 06/26/23  0513   IRON 30*   IRONSAT 21   FADI 2,616*       Current Medications:   acetaminophen  975 mg Oral Q8H    amLODIPine  5 mg Oral Daily    [Held by provider] apixaban ANTICOAGULANT  2.5 mg Oral BID    atorvastatin  40 mg Oral Daily    bisacodyl  10 mg Rectal Once    cefTAZidime  2 g Intravenous Q24H    ferrous sulfate  325 mg Oral Every Other Day    folic acid  1 mg Oral Daily    [Held by provider] heparin ANTICOAGULANT  5,000 Units Subcutaneous Q12H    heparin lock flush  5-20 mL Intracatheter Q24H    insulin aspart  1-7 Units Subcutaneous TID AC    insulin aspart  1-5 Units Subcutaneous At Bedtime    lactobacillus rhamnosus (GG)  1 capsule Oral BID    miconazole   Topical BID    multivitamin w/minerals  1 tablet Oral Daily    senna-docusate  1 tablet Oral BID    sodium chloride (PF)  10 mL Intracatheter Q8H    sodium chloride (PF)  10-40 mL  Intracatheter Q8H    vancomycin place montilla - receiving intermittent dosing  1 each Intravenous See Admin Instructions    cholecalciferol  50 mcg Oral Daily       OPAL MAYER PA-C

## 2023-07-24 NOTE — CARE PLAN
"  VS: /67 (BP Location: Left arm)   Pulse 79   Temp 98.1  F (36.7  C) (Oral)   Resp 18   Ht 1.778 m (5' 10\")   Wt 132.2 kg (291 lb 6.4 oz)   SpO2 94%   BMI 41.81 kg/m     O2: RA, denies SOB and CP.   Output: Incontinent of bowel & bladder. Briefs changed this shift.    Last BM: 07/23/2023   Activity: Ax3 for cares.    Skin: LLE surgical incision, blanchable redness to caridad area and buttocks. Cleaned and lotions and miconazole applied as ordered.   Pain: Managed with Tylenol.   Neuro: A & O x3, intermittently confused   Dressing: CDI   Diet: Regular diet. BG checks. No novolog given this shift.    LDA: R CVC HL   Equipment: Personal belongings, call light, bedpan, IV pump/pole, pulsate mattress.   Plan: Continue with plan of care, waiting on placement.   Additional Info: Patient will have a dialysis catheter placement tomorrow (7/24), so he is going to be NPO starting midnight.        "

## 2023-07-24 NOTE — PROGRESS NOTES
"Attemped to assess Don. Indicated I needed to look at skin. He said don't touch me. I adjusted pillow to off load Left heel. He said \"dont you f##### understand, don't touch me\". I educated on importance of assessing for further skin breakdown, PIs, infection, and possible death. He did not reply to communication. He was compliant with taking morning meds. Informed him of his NO status and estimated 1330 time for IR procedure.  "

## 2023-07-24 NOTE — PROGRESS NOTES
"VS: BP (!) 160/82 (BP Location: Left arm)   Pulse 82   Temp 97.6  F (36.4  C) (Oral)   Resp 16   Ht 1.778 m (5' 10\")   Wt 132.2 kg (291 lb 6.4 oz)   SpO2 96%   BMI 41.81 kg/m       O2: RA, denies SOB and CP.   Output: Incontinent of bowel & bladder. Chux changed this shift, male pure wick in place, but minimally effective.   Last BM: 07/23/2023   Activity: Ax3 for cares.    Skin: LLE surgical incision CDI, dsg removed as only holding in urine from inc. Other assessment refused.   Pain: Managed with Tylenol.   Neuro: A & O x3, intermittently confused   Dressing: Left Heel mepilex   Diet: NPO for IR procedure. BG checks for meals.    LDA: R CVC HL   Equipment: Personal belongings, call light, bedpan, IV pump/pole, pulsate mattress.   Plan: Continue with plan of care, waiting on placement.   Additional Info: Patient dialysis catheter placement now in question.        IR called that they were ready for him to come down on a cart. They called back and said place transport on hold as providers were discussing if he needed the procedure. Waiting for a further update.           "

## 2023-07-24 NOTE — PLAN OF CARE
Pt A&O but disoriented to time,place and situation. VSS. Afebrile. 02 sats. In the 90s on RA. Lungs clear. Denies SOB, CP or nausea. NPO since midnight.  Bowel sound active in all quadrants. No BM, attempted to use bedpan but it was unsuccessful. Noted to pas gas .Voiding via condom catheter . Noted to be in pain with repositioning ,prn oxycodone with good effect, CMS  denies N/T. Right CVC patent and TKO. Pt slept between care and is able to make needs known, call light with in reach. Will continue to monitor.   Redness under folds  Buttock,caridad area dry and red , cream applied     NPO since midnight

## 2023-07-25 NOTE — PROGRESS NOTES
"CLINICAL NUTRITION SERVICES - REASSESSMENT NOTE     Nutrition Prescription    RECOMMENDATIONS FOR MDs/PROVIDERS TO ORDER:  Please order all meals with pt and help to set up tray and eat as needed    Malnutrition Status:    Severe malnutrition in the context of chronic illness    Recommendations already ordered by Registered Dietitian (RD):  Encouraged pt to work with nursing to order meals  Discontinued non-select trays  Ordered pt care order and spoke with nursing regarding helping pt to order foods and help pt with feeding as needed     Future/Additional Recommendations:  Monitor labs, intakes, and weight trends.    If EN becomes POC:  --Enteral access: NGT  --Pending AXR confirmation of feeding tube position  --GOAL: Osmolite 1.5 Edv (or equivalent) @ goal of  55ml/hr  (1320ml/day) provides: 1980 kcals, 83 g PRO, 1005 ml free H20, 268 g CHO, and 0 g fiber daily. + 1 pkt prosource TF 20 = 2060 kcal (24 kcal/kg) and 103 g protein (1.2 g/kg).  --Start TF @ 15 ml/hr and advance by 10 ml q 8 hrs until goal rate.  --Do not start or advance TF rate unless K+ >3.0, Mg++ > 1.5,  and Phos > 1.9.  --Minimum 30 ml q 4 hrs water flushes for tube patency.  --If gastric enteral access: HOB > 30 degrees or Reverse Trendelenburg >10-25 degrees     EVALUATION OF THE PROGRESS TOWARD GOALS   Diet: Renal, non-dialysis  Intake/[place]: 25 - 100% of documented meals.    Pt receiving (on average) 1325 kcal and 52 g protein per day per [employer]. With documented intakes, pt is likely meeting 39% minimum energy and 30% minimum protein needs.     NEW FINDINGS/REVIEW OF SYSTEMS    Nutrition/GI: Per RN documentation and discussion, pt upset about trays arriving without him having ordered it. Discussed ways for nursing to help pt order as pt also does not enjoy nutrition services coming to order meals in advance.     RD met with pt at bedside. Pt reports not liking the foods that are sent to him stating they are \"crap.\" Explained to pt " the importance of eating and protein for healing and mobility. RD urged pt to work with nursing staff to order at least 2 meals daily. Explained reasoning for current diet order and importance of hydration. Pt trying to eat during visit but with difficulty due to muscle twitching. Pt had difficulty opening applesauce and difficulty getting food from a spoon to his mouth. Also dropped and knocked over water cup multiple times due to twitches/inability to control hands. RD offered help multiple times but pt did not accept.     RD dicussed with RN in [facility] about pt need for help both ordering and eating as needed. Asked to pass this on to other nursing staff.     Weights: Pt with previous 81 lb (25%) weight loss in 6 months, now with 48 lb (20%) weight gain in last two months. Unsure of accuracy of last two weights. No new weight since 7/11.     Wt Readings from Last Encounters:   07/11/23 132.2 kg (291 lb 6.4 oz)   06/16/23 115.2 kg (254 lb)   05/26/23 110.4 kg (243 lb 4.8 oz)   05/19/23 110.4 kg (243 lb 4.8 oz)   03/23/23 117 kg (257 lb 14.4 oz)   01/09/23 119.7 kg (264 lb)   11/22/22 147.1 kg (324 lb 4.8 oz)   11/14/22 142.9 kg (315 lb)   11/09/22 146.8 kg (323 lb 9.6 oz)   05/19/22 136.1 kg (300 lb)   07/03/18 148.9 kg (328 lb 4.8 oz)     Skin: L heel pressure wound, unstagable - likely other pressure wounds however pt refusing assessment.      Labs reviewed   07/21/23 11:25 07/22/23 07:47 07/23/23 08:25 07/24/23 05:07 07/25/23 07:35   Urea Nitrogen 53.6 (H) 57.0 (H) 57.4 (H) 56.9 (H) 57.7 (H)   Creatinine 5.34 (H) 5.71 (H) 5.80 (H) 5.88 (H) 6.05 (H)   Phosphorus 5.8 (H) 6.2 (H) 6.3 (H) 5.9 (H) 6.2 (H)   Albumin 3.0 (L) 3.0 (L) 3.2 (L) 3.3 (L) 3.1 (L)   Glucose 129 (H) 127 (H) 118 (H) 125 (H) 102 (H)      Medications reviewed: Ceftazidime, ferrous sulfate, folic acid, insulin, culturell, multivitamin, senna, vancomycin, vitamin D3    MALNUTRITION  % Intake: </= 50% for >/= 5 days (severe)  % Weight Loss: > 10% in  6 months (severe malnutrition)  Subcutaneous Fat Loss: Facial region:  moderate and Upper arm:  moderate   Muscle Loss: Temporal:  severe, Thoracic region (clavicle, acromium bone, deltoid, trapezius, pectoral):  severe, and Upper arm (bicep, tricep):  severe  Fluid Accumulation/Edema: Does not meet criteria  Malnutrition Diagnosis: Severe malnutrition in the context of chronic illness    Previous Goals   Patient to consume % of nutritionally adequate meal trays TID, or the equivalent with supplements/snacks.  Evaluation: Not met    Previous Nutrition Diagnosis  Inadequate oral intake related to poor appetite, menu fatigue, altered mental status as evidenced by pt and RN report and documented intakes.   Evaluation: No change    CURRENT NUTRITION DIAGNOSIS  Inadequate oral intake related to poor appetite, menu fatigue, altered mental status as evidenced by pt and RN report and documented intakes.     INTERVENTIONS  Implementation  Nutrition education for nutrition relationship to health/disease   Collaboration with other providers - RN    Goals  Patient to consume % of nutritionally adequate meal trays TID, or the equivalent with supplements/snacks.    Monitoring/Evaluation  Progress toward goals will be monitored and evaluated per protocol.    Dee Alanis MS, RDN, LD  RD pager: [phone]  WB Weekend/Holiday Pager: [phone]

## 2023-07-25 NOTE — PROGRESS NOTES
[facility] Nurse Inpatient Assessment     Consulted for: Left heel    /25/2023: Attempted to see previously for buttocks. Patient continues to flatly refuse assessment for buttocks today. Per bedside RN, buttocks is red but blanchable, patient continues to be incontinent of urine and stool.       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  Postop course complicated by profound deconditioning.  He was admitted to TCU initially on 12/23/2022 for physical rehabilitation, however therapies no longer worked with patient after several months of an in bed therapy and his refusals to mobilize.  Underwent biopsy on 4/7/2023 without evidence of infection.  He was transferred back to [facility] for left total hip arthroplasty and reimplantation on 5/26/23 with Dr. Meyers.  Presented again to [facility] for persistent MRSA bacteremia on 6/17.     Assessment:      Areas visualized during today's visit: Lower extremities     Pressure Injury Location: Left heel     7/19/23 7/25  Last photo: 7/15  Wound type: Pressure Injury     Pressure Injury Stage: either Stage 2 or 3 deferring definitive staging until wound base has evolved, hospital acquired   Wound history/plan of care:  Pt known to [facility] service. Pt known to decline cares and repositioning.   7/19 initial assessment: Spent approx 20 mins on negotiating how to move leg to be able to minimally assess wound. Pt agreed to see picture of wound. This writer explained wound to pt using the photo. Discussed with patient if he  would like legs elevated on pillows or boots. He agreed to pillows.    7/20: Assessed wound with Kristen Hadley RN CWOCN. Confirmed thick slough/eschar wound base. Again discussed keeping heels off bed with pt.  7/25/2023: Of note, patient has refused and continues to refuse heel off-loading boots on assessment today.  Wound base: 70 % slough/eschar, 30 % dermis around edges     Palpation of the wound bed: boggy      Drainage: small     Description of drainage: serosanguinous     Measurements (length x width x depth, in cm) 3 x 3 x 0.2 cm     Tunneling N/A     Undermining N/A  Periwound skin: Intact      Color: normal and consistent with surrounding tissue      Temperature: normal   Odor: none  Pain: severe, shooting and stabbing  Pain intervention prior to dressing change: slow and gentle cares   Treatment goal: Infection control/prevention and soften nonviable tissue  STATUS: unchanged  Supplies ordered: discussed with RN and discussed with patient       Treatment Plan:     Left heel wound(s): Every other day   Strongly encourage pt to elevate heels on pillows to float off bed surface at all times. (He agreed to this with WOC)  Cleanse wound with wound cleanser.   Apply light layer of Triad paste (#[id number]) to wound bed  Cut piece of adaptic to cover paste and wound  Secure with mepilex.  If patient agrees to heel off-loading boots, please apply.    Orders: Reviewed    RECOMMEND PRIMARY TEAM ORDER: None, at this time  Education provided: importance of repositioning, plan of care, wound progress and Infection prevention   Discussed plan of care with: Patient and Nurse  WOC nurse follow-up plan: weekly  Notify WOC if wound(s) deteriorate.  Nursing to notify the Provider(s) and re-consult the WOC Nurse if new skin concern.    DATA:     Current support surface: Bariatric Low air loss (BIN pump, Isolibrium, Pulsate, skin guard, etc)  Containment of urine/stool: Incontinence Protocol  BMI: Body mass index is 41.81 kg/m .    Active diet order: Orders Placed This Encounter      Renal Diet (non-dialysis)     Output: I/O last 3 completed shifts:  In: -   Out: 100 [Urine:100]     Labs:   Recent Labs   Lab 07/25/23  0735 07/21/23  1125 07/20/23  0538   ALBUMIN 3.1*   < > 2.7*   HGB  --   --  8.0*   WBC  --   --  8.4    < > = values in this interval not displayed.       Pressure injury risk assessment:   Sensory Perception: 3-->slightly limited  Moisture: 3-->occasionally moist  Activity: 1-->bedfast  Mobility: 2-->very limited  Nutrition: 2-->probably inadequate  Friction and Shear: 2-->potential problem  Rafael Score: 13    Kristen Hadley RN CWOCN  Pager no longer is use, please contact through [employer]: TAMARA Nurse [facility]  Dept. Office Number: [phone]

## 2023-07-25 NOTE — PLAN OF CARE
"Blood pressure (!) 171/81, pulse 83, temperature 98  F (36.7  C), temperature source Oral, resp. rate 12, height 1.778 m (5' 10\"), weight 132.2 kg (291 lb 6.4 oz), SpO2 90 %.              Please refer to flowsheet for full patient assessment and MAR for all medications administered during shift.        Pt A&Ox3, Intermittently confused. Fall precautions maintained with bed alarm on, bed locked and in lowest position. Pain managed with scheduled medication (see mar) POC discussed, questions encouraged and answered. Plan for today TBD. No acute events during shift. Pt did have an episode of agitation and swinging but became calm a little later. Frequent round and checks done. POC continues.                "

## 2023-07-25 NOTE — PROGRESS NOTES
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  Postop course complicated by profound deconditioning.  He was admitted to TCU initially on 12/23/2022 for physical rehabilitation, however therapies no longer worked with patient after several months of an in bed therapy and his refusals to mobilize.  Underwent biopsy on 4/7/2023 without evidence of infection.  He was transferred back to [facility] for left total hip arthroplasty and reimplantation on 5/26/23 with Dr. Meyers.  Presented again to [facility] for persistent MRSA bacteremia on 6/17. His course has been c/b HALEY. Baseline creat 1.0 as recent as 6/19/23).        HALEY stage II  Recurrent HALEY in setting of significant hemodynamic changes including diuretics and Vancomycin use   Since his admission, have had multiple HALEY episodes, recently a week ago when he was given diuretics for volume management among others including continue use of vancomycin (level was slightly high on 7/13). Now his creatinine continue to trend up. Also noted pt continued to be altered intermittently. Question about uremic syndrome raised which can not be ruled out at this point with continued renal dysfunction for quite some time.  His renal function is likely stabilizing in the past 48 hours where his eGFR is about 9-10 ml with stable electrolytes.    Please refer to note of Megan Kessler on 7/21/23 for care conference and decision about HD. See part of the note below:     \" - We discussed that currently Rahat would not meet OP HD criteria given that he is a transfer of 3 and would likely require a bed. The only hope at " "this time of any improvement in his cognitive function, likely a very small chance, is that with dialysis any contribution of uremia may improve and allow him to improve his cognition and either be clear about wanting dialysis and or become more engaged in therapies so that he could be a candidate for OP HD.     - The decision by brother Ivan was to go ahead with a 2 wk trial of HD and if at the end of the 2 wks no change in his cognitive status, discontinue HD. If Don improves on HD then he would ultimately remain inpatient until he was strong enough to meet OP HD criteria.     - Will plan on initiating HD on Monday. IR orders have been placed. Primary team discontinuing Apixiban and switching to Heparin   - Continue to monitor for renal recovery with daily chemistry labs/strict I/O and daily weights\"    - continue holding diuresis   - ordered 1 L of LR bolus.   - will monitor closely for next 24-48 hours for further recovery     Volume status - No edema/dyspnea/hypoxia. Bp 160'/. I/O's not documented yesterday. Has had 100 ml plus unmeasured occurrences so far today. (Using a urinal but also incontinent).    - Furosemide 40 mg every day was discontinued 7/20/23 w/o improvement    - Please weigh daily. Zero bed    - Continue I/O     3. HTN - Borderline control in HALEY. Bp 160's/. No edema  Current regimen: Amlodipine 5 mg every day    - Avoid hypotension. B/p ok at this time   - continue holding diuretic     4. MRSA bacteremia- Will be completing 6 wks of Vancomycin on 8/1/23. Continue Fortaz.    - UC with VRE but only 10-50 k. No treatment required   - Per ID     5. Electrolytes - No acute issues today. Na 145, K 3.8   - Given recent hypernatremia continue to encourage fluid intake     6 Acid base - No acute concerns. Bicarb 22     7. BMD - Ca 8.6 Phos 6.2, up from 5.9, albumin 3.1 ( improved)   - Malnutrition present: Getting MVI. Would benefit from dietary supplement   - No need for Phos binder at this time   - " "Vit D 16   - Began D3 - 50 mcq every day 7/18   - Switched to Renal diet 7/19     8. Anemia - last Hgb 8.0   - Etiology acute illness   - On Iron, folic acid   - Iron studies 6/26/23: Ferritin 2616, Fe 30 IS 21   - Does have DVT history on Eliquis - switched to heparin   - Ferritin too high for IV iron   - Transfusions per primary team      Recommendations were communicated to primary team verbally.     Seen and discussed with Dr. Bryan.     OPAL MAYER PADeeptiC   Division of Renal Disease and Hypertension  [facility]  tomasa  [employer] Web Console    Interval History :   Nursing and provider notes from last 24 hours reviewed.  In the last 24 hours Waldo Rodriguezy been hemodynamically stable. No new symptoms provided today. He is more alert and interactive today. He has no n/v/d. No shortness of breath.    Review of Systems:   As noted above in interval history    Physical Exam:   I/O last 3 completed shifts:  In: -   Out: 100 [Urine:100]   BP (!) 160/77 (BP Location: Left arm)   Pulse 94   Temp 97.5  F (36.4  C) (Oral)   Resp 16   Ht 1.778 m (5' 10\")   Wt 132.2 kg (291 lb 6.4 oz)   SpO2 94%   BMI 41.81 kg/m       General : Pt awake, not in acute distress   Lungs : no respiratory distress  Cardiac : RRR  LE :trace Edema noted  Dialysis Access : N/A    Labs:   All labs reviewed by me  Electrolytes/Renal -   Recent Labs   Lab Test 07/25/23  0800 07/25/23  0735 07/25/23  0204 07/24/23  0816 07/24/23  0507 07/23/23  1138 07/23/23  0825 07/12/23  1209 07/12/23  0911 07/05/23  0823 07/05/23  0700 07/02/23  1754 07/02/23  1149   NA  --  145  --   --  143  --  143   < > 143   < > 142   < > 137   POTASSIUM  --  3.8  --   --  3.8  --  4.1   < > 4.1   < > 4.5   < > 4.3   CHLORIDE  --  108*  --   --  106  --  104   < > 112*   < > 108*   < > 107   CO2  --  22  --   --  22  --  22   < > 19*   < > 16*   < > 21*   BUN  --  57.7*  --   --  56.9*  --  57.4*   < > 43.6*   < > 46.3*   < > 39.1*   CR  --  6.05*  --   --  5.88*  " --  5.80*   < > 4.44*   < > 3.30*   < > 2.20*   GLC 92 102* 121*   < > 125*   < > 118*   < > 105*   < > 99   < > 128*   MAIKOL  --  8.6*  --   --  8.7*  --  8.9   < > 8.3*   < > 7.7*   < > 7.9*   MAG  --   --   --   --   --   --   --   --  1.6*  --  1.7  --  1.7   PHOS  --  6.2*  --   --  5.9*  --  6.3*   < > 5.0*   < >  --   --   --     < > = values in this interval not displayed.       CBC -   Recent Labs   Lab Test 07/20/23  0538 07/11/23  0656 07/10/23  1316   WBC 8.4 8.5 9.0   HGB 8.0* 8.1* 8.2*    262 269       LFTs -   Recent Labs   Lab Test 07/25/23  0735 07/24/23  0507 07/23/23  0825 07/06/23  1708 07/06/23  0713 07/04/23  0627 06/22/23  0711 06/20/23  0704   ALKPHOS  --   --   --   --  103 99  --  93   BILITOTAL  --   --   --   --  <0.2 0.2  --  0.2   ALT  --   --   --   --  12 14  --  14   AST  --   --   --   --  9 11  --  14   PROTTOTAL  --   --   --   --  5.3* 5.9*  --  5.9*   ALBUMIN 3.1* 3.3* 3.2*   < > 2.4*  2.4* 2.6*   < > 2.3*    < > = values in this interval not displayed.       Iron Panel -   Recent Labs   Lab Test 06/26/23  0513   IRON 30*   IRONSAT 21   FADI 2,616*       Current Medications:   acetaminophen  975 mg Oral Q8H    amLODIPine  5 mg Oral Daily    [Held by provider] apixaban ANTICOAGULANT  2.5 mg Oral BID    atorvastatin  40 mg Oral Daily    bisacodyl  10 mg Rectal Once    cefTAZidime  2 g Intravenous Q24H    ferrous sulfate  325 mg Oral Every Other Day    folic acid  1 mg Oral Daily    heparin ANTICOAGULANT  5,000 Units Subcutaneous Q12H    heparin lock flush  5-20 mL Intracatheter Q24H    insulin aspart  1-7 Units Subcutaneous TID AC    insulin aspart  1-5 Units Subcutaneous At Bedtime    lactobacillus rhamnosus (GG)  1 capsule Oral BID    miconazole   Topical BID    multivitamin w/minerals  1 tablet Oral Daily    senna-docusate  1 tablet Oral BID    sodium chloride (PF)  10 mL Intracatheter Q8H    sodium chloride (PF)  10-40 mL Intracatheter Q8H    vancomycin place montilla -  receiving intermittent dosing  1 each Intravenous See Admin Instructions    cholecalciferol  50 mcg Oral Daily       OPAL MAYER PA-C

## 2023-07-25 NOTE — PROGRESS NOTES
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  Postop course complicated by profound deconditioning.  He was admitted to TCU initially on 12/23/2022 for physical rehabilitation, however there was no progress with therapies after several months of in bed therapy and his refusals to mobilize. Underwent biopsy on 4/7/2023 without evidence of infection.  He was transferred back to [facility] for left total hip arthroplasty and reimplantation on 5/26/23 with Dr. Meyers.  Presented again to [facility] for persistent MRSA bacteremia on 6/17.     Brief Hospital Summary:    Pt was admitted to continue infectious workup with MRSA bacteremia.  Admission blood cultures were positive, ID and Ortho were consulted. KEILA was obtained on 6/19, which was negative for endocarditis, ortho took for washout/debridement of hip on 6/21. ID recommending 6 weeks IV abx (6/21-8/1) via tunneled internal jugular line (nephrology asked to defer PICC) followed by 6 weeks of PO cipro/doxy. He has developed persistent encephalopathy that is felt to be multifactorial. Neurology consulted and there was concern for contribution of cefepime neurotoxicity. EEG was negative for seizures and CT head negative. He did have some mild improvement after stopping cefepime, but has not returned to baseline. Complicating the situation further is that he has developed worsening acute renal failure with now concern for uremia contributing to his mental status. Care conference on 7/21 with brother Ivan  as surrogate decision maker with plan for short trial of dialysis to determine if pt has any meaningful improvement.    Today:  - Follow up nephro recs: still holding on HD initiation  - Nephro ordered 1L LR bolus  - Cont IV vanc and ceftazidime   - Cont subQ heparin in place of DOAC in case needs AC held for line + HD initiation in coming days     Goals of Care  Complex medical course over the past 6+ months. Was in [facility] since 12/2022 due to profound deconditioning, but ultimately refused to participate meaningfully in therapies. Was readmitted in 6/23 with MRSA bacteremia after repeat hip surgery in 4/23. His hospital course has been complicated by acute renal failure, multifactorial encephalopathy and apathy/refusal to participate in therapies or medical cares. He was assessed by psychiatry on 7/18 and felt to be non-decisional regarding complex medical decisions or disposition. His brother (Ivan) is his legal POA, but Don does not have a healthcare directive. Care conference held on 7/21 with medicine, palliative, renal, SW, RN manager - discussed the above and options including a short trial (~2 weeks) of dialysis to see if it improves his mental function and willingness/ability to participate in therapies vs moving towards a comfort-based approach. Ultimately, Ivan decided to move forward with the trial of dialysis and a plan to reassess in ~2 weeks to determine next steps. We discussed that if he does not meaningfully improve, outpatient dialysis would likely not be an option due to his profound immobility.  - Plan to move forward with short trial of dialysis.  - Will monitor for improvement with a plan to reassess after dialysis trial to make next decisions regarding goals of care.  - Continue full code for now. Ivan to discuss more with his brothers.    Multifactorial encephalopathy - likely toxic metabolic, uremic, delirium, depression contributing  Noted to have acutely worsening mental status  around ~7/4, which was initially felt to be related to cefepime neurotoxicity. Neurology consulted. EEG without seizures. CT head, ammonia and VBG wnl. Completed 5 days of antibiotics. No clinical or objective findings concerning for worsening infection. Does have worsening renal failure with rising BUN, so some consideration that uremia could be contributing, but overall not hugely elevated. Also concern that depression is contributing.  - Delirium precautions  - Nephrology following  - After care conference on 7/21, will plan for a short trial of inpatient dialysis starting early next week to see if it improves his mental status  - Psychiatry consulted on 7/18  for capacity assessment - pt not decisional regarding complex medical decisions or disposition.    MRSA bacteremia due to possible left hip septic arthritis vs other source Blood cultures: 6/14, 6/15 + for MRSA (4/4), and 6/16 (1/2 GPCs) and 6/17 (2/2) GPCs. - started vancomycin 6/15.   6/17 sinovial fluid cultures with staph and klebsiella. Purulent drainage noted at incision site at TCU, CT left hip concerning for infection (no mention of joint, mostly anterior in soft tissue) and couldn't exclude abscess/inf  - ID intermittently following through hospital course. Last seen 7/21.  - Continue ceftazidime, vancomycin for C.acnes, Pseudomonas, MRSA PJI until 8/1/2023  - Start oral doxycycline and ciprofloxacin on 8/2/2023 for an additional 6 weeks of therapy (total duration = 12 weeks)    Acute Kidney Injury - multifactorial, see below  Nephrology consulted, appreciate recs. Baseline creatinine 1.0 or 1.1. Etiology: suspect combo of sepsis/inflammation, hypovolemia, blood loss anemia following surgery, vancomycin. UA recollected on 7/19 with granular casts suggesting ongoing dense ATN. Creatinine continuing to uptrend, however no acidosis, electrolyte abnormalities or evidence of hypervolemia. Some concern that uremia could be contributing to his  encephalopathy. After GOC on 7/21, will plan to pursue short trial of dialysis.  - Nephrology consulted  - Planning to move forward with short trial of dialysis.   - See above for GOC converation.    Asymptomatic bacteruria (VRE)  UA obtained on 7/19 to assess for casts, reflexed to culture which is growing VRE. Labs and vitals with low concern for new infection. Discussed with ID who recommended against treatment.    Bilateral LE edema due to volume overload from HALEY and iatrogenic from blood transfusions  Hyperesthesia of bilateral lower extremities (no signs of cellulitis as of 7/13)  - Holding diuresis starting 7/20    L heel pressure wound, unstagable  Noted earlier in pts hospitalization (~6/20), but at that time he refused assessment. Noted again by nursing to be worsening on 7/19 and WOCN consult was placed. Pt was agreeable and had wound assessed. He has been mostly non-agreeable to offloading or other preventative measures recommended by RN and WOCN, but will sometimes allow it with significant encouragement.  - WOCN consult  - Continue to encourage pt to utilize offloading techniques and to participate in wound cares.    Hypernatremia - resolved  Uptrending recently, 147 on 7/18. Likely volume down. S/p D5W on 7/18, resolved.  - Monitor. Holding diuretics.    Hypoxemic Respiratory Failure due to immobility, surgery - resolved  - continue to follow; encourage IS    Acute blood loss anemia following surgery, complicated by use of blood thinner  - 3 units day after surgery did joint washout (6/22) for acute blood loss anemia, 1 unit 6/25  - watch for bleeding; hold apixaban if need be - had unprovoked dvt in 2018 and would be on eliquis for life, high risk of recurrent DVT given recent procedures and illness/immobility  - iron and folic acid daily    S/p left hip explantation of left hip antibiotic spacer and revision of left total hip arthroplasty (5/26/2023)  - ortho performed washout 6/21  -Activity: 50%  PWB LLE with posterior hip precautions [id number]  -AFO ordered for foot drop but patient refusing  -PT/OT  -Knee to be kept even in a foam leg elevator to relax sciatic nerve  -Pain: holding robaxin 4 times a day with mental status changes (discontinuing), APAP PRN  -stop tramadol (7/11) due to renal function  -increase oxycodone 2.5-5 mg q6h PRN- taking very minimal amts so unlikely to be contributing to mental status    Hx of unprovoked DVT in 2018   - Continue heparin subcutaneous BID pending line placement on 7/24  - Hold PTA apixiban     Type 2 diabetes mellitus  - Last hemoglobin A1c 5.6% on 6/16/2023.  On glipizide, metformin, Actos, and Victoza prior to hospitalizations and surgery.   - continue ISS    Constipation, improved  -Continue Colace 100 mg twice daily  -Continue senna 1 tablet twice daily-- had briefly increased to 2 tab twice daily due to lack of stool, now improved  -Continue MiraLAX daily     Hypomagnesemia  -monitor and replete      Severe malnutrition in the context of acute on chronic illness  Goal for patient is to consume % of meals TID. Goal is 1597-0225 kcals/daily. Protein needs- 100-126 grams/daily.   -RD consult and appreciate recs  -regular diet  -Weekly weights, daily I/O     Adjustment disorder with depressed mood  Prolonged grief disorder  Patient has been with depressed mood in setting of ongoing health issues, as evidenced by lack of motivation to work with therapies during both TCU admissions.  Past history of passive suicidal statements. Psychiatry saw patient during hospital admission on 5/31 due to lack of motivation with therapies. Patient declined interest in starting any psychiatric medications. Was willing to engage with health psychology.   -Outpatient health psychology consult  -Continue to monitor for willingness to start antidepressant therapy  -tried to discuss mood 7/15 but he didn't fully answer questions, seemed to talk about different aspect of his health  "that wasn't quite related to mood     HTN:   - Started amlodipine 7/16    Stable and Resolved Issues:  Hyperlipidemia-continue PTA Lipitor 40 mg daily  JAIR-CPAP when sleeping       Diet: Room Service  Renal Diet (non-dialysis)    DVT Prophylaxis: Heparin SQ  Tran Catheter: Not present  Lines: PRESENT      CVC Single Lumen Right Internal jugular Non - valved (open ended);Tunneled;Power injectable-Site Assessment: WDL      Cardiac Monitoring: None  Code Status: Full Code      Clinically Significant Risk Factors              # Hypoalbuminemia: Lowest albumin = 2.2 g/dL at 7/8/2023  8:51 AM, will monitor as appropriate    # Acute Kidney Injury, unspecified: based on a >150% or 0.3 mg/dL increase in last creatinine compared to past 90 day average, will monitor renal function         # Severe Obesity: Estimated body mass index is 41.81 kg/m  as calculated from the following:    Height as of this encounter: 1.778 m (5' 10\").    Weight as of this encounter: 132.2 kg (291 lb 6.4 oz).   # Severe Malnutrition: based on nutrition assessment         Disposition Plan           Jesse Ring,   Hospitalist Service, GOLD TEAM 21  [facility]  Securely message with [employer] (more info)  Text page via [facility] Paging/Directory   See signed in provider for up to date coverage information  ______________________________________________________________________    Interval History   Pt today states he feels \"terrible\" but has a difficult time elaborating on this. When asked, he has a headache. I asked him why else he feels terrible and other ROS questions, and he repeated the answer \"all meds look alike\" to all of them.     Physical Exam   Vital Signs: Temp: 97.5  F (36.4  C) Temp src: Oral BP: (!) 167/74 Pulse: 94   Resp: 16 SpO2: 94 % O2 Device: None (Room air)    Weight: 291 lbs 6.4 oz    General Appearance:  NAD. Lying comfortably in bed.  Respiratory: CTAB. No increased " WOB.  Cardiovascular: RRR.  GI: Soft. Non-tender. Non-distended.  Skin: No rash.  Other:  Moving all extremities. Normal affect. Intermittent myoclonus.    Medical Decision Making         Data     I have personally reviewed the following data over the past 24 hrs:    N/A  \   N/A   / N/A     145 108 (H) 57.7 (H) /  92   3.8 22 6.05 (H) \     ALT: N/A AST: N/A AP: N/A TBILI: N/A   ALB: 3.1 (L) TOT PROTEIN: N/A LIPASE: N/A

## 2023-07-25 NOTE — PLAN OF CARE
"  VS: BP (!) 160/77 (BP Location: Left arm)   Pulse 94   Temp 97.5  F (36.4  C) (Oral)   Resp 16   Ht 1.778 m (5' 10\")   Wt 132.2 kg (291 lb 6.4 oz)   SpO2 94%   BMI 41.81 kg/m       Output: Incontinent of bladder today but also asked to use urinal this afternoon. Strict I&O. No BM this shift.   Lungs: LS diminished. Room air. Denies SOB.   Activity: A2 with turning and repositioning in bed.   Skin: Wound on left heel, moisture damage on back/buttocks/posterior thighs, erythema in skin folds. See POC for skin care orders.   Pain:   Headache and pain in bilateral feet managed with scheduled tylenol.   Neuro/CMS:   Disoriented to time and situation. Difficulty finding words and has frequent confusion. Denies N/T.    Dressing(s):   Dressing change on left heel completed today. Preventative mepilex placed on right heel.    Diet:   Renal diet. Room service discontinued today. Pt would like to be told what is being ordered for him for each meal. Needs assistance setting tray up and cutting food.   LDA:   Right chest port heparin locked.   Equipment:   IV pump, IV pole, bariatric low air loss bed.   Plan:   Continue to encourage repositioning and activity, continue to promote skin integrity, continue to monitor I&O and renal function. Will not initiate hemodialysis until pt's renal function is monitored closely x 1-2 days.   Additional Info:   Call light within reach but pt has not been calling today. Frequent rounding on pt.        "

## 2023-07-25 NOTE — PROGRESS NOTES
"VS: /80 (BP Location: Left arm)   Pulse 86   Temp 98.6  F (37  C) (Oral)   Resp 16   Ht 1.778 m (5' 10\")   Wt 132.2 kg (291 lb 6.4 oz)   SpO2 96%   BMI 41.81 kg/m       O2: Sating >90% on RA.  Denies chest pain and SOB.   Output: Incontinent of bowel and bladder. External pure wick in place    Last BM: LBM 7/23   Activity: Up with Ax3 and lift for cares    Skin: LLE surgical incision.    Pain: Pt. Denied pain this shift    CMS: A&Ox3 slight confusion    Dressing: Dressing to Left heel mepilex  C/D/I.   Diet: Renal diet BG checks /W meals    LDA: R CVC single lumen is S/L.    Equipment: IV pole, FWW, gait belt, and personal belongings. Call light within reach and uses appropriately.   Plan:  Placement    Additional Info: NO acute changes this shift Continue POC. Procedure rescheduled.        "

## 2023-07-25 NOTE — PLAN OF CARE
Goal Outcome Evaluation:      Plan of Care Reviewed With: patient    Overall Patient Progress: no change    Outcome Evaluation: Pt with continued poor appetite/intakes. Pt needing help ordering meals given pt with some confusion and refusal to order meals himself. Encuraged adequate intakes.

## 2023-07-26 NOTE — PROGRESS NOTES
GENERAL ID SERVICE ONGOING CONSULTATION     Patient:  Waldo Bustos   Date of birth 1950, Medical record number [id number]  Date of Visit:  07/26/2023  Date of Admission: 6/17/2023            Assessment and Recommendations:   ASSESSMENT:  MRSA bacteremia, suspected secondary to left hip PJI and associated soft tissue infection; KEILA negative for IE   Left hip PJI s/p DAIR (6/21/23 with Dr. Meyers) cultures: MRSA, C.acnes, and Pseudomonas; hardware appearance was relatively reassuring so components left in place. Nidus may have been indwelling drain.  Prior recurrent L BELLA PJI over multiple years since 2018 eventually treated with 2-stage revision with explant in 11/2022 (pre-op cx proteus mirabilis and porphyromonas, intra-op cx Proteus mirbilis and Finegoldia magna) with second-stage in 5/2023 following aspiration with negative cultures prior to hardware reinsertion  4. RA  5. Venous insufficiency  6. HALEY, onset around 6/21, attributed to elevated vanco trough, hemodynamics, sepsis due to MRSA bacteremia  7. Delirium, likely cefepime neurotoxicity in the setting of HALEY   8. Lytic lesion in the medial L ilium, measuring 3.6x5.6 cm, present since 2018  9. DM2      RECOMMENDATION:  1. Continue ceftazidime, vancomycin for C.acnes, Pseudomonas, MRSA PJI until 8/1/2023  2. Start oral doxycycline and ciprofloxacin (cipro dosed for renal function) on 8/2/2023 for an additional 6 weeks of therapy (total duration = 12 weeks). This translates to a stop date of 9/13/23.  3. Suggest ID follow up in about 4 weeks on/around 8/21 to assess status following transition to PO regimen. If he remains in-house, he could be seen by the inpatient consult service.    Thank you for this consult. ID will sign off, but please page with additional questions! Floor time =50 min  Pat Mercer MD   of Medicine, Division of Infectious Diseases  Contact me on the [employer] meka or console  pgr  "[phone]      ________________________________________________________________    Interval 24hrs: still hoping for renal recovery. Don when I ask him how he is doing says that thinks \"suck.\" No additional history provided.         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  Postop course complicated by profound deconditioning.  He was admitted to TCU initially on 12/23/2022 for physical rehabilitation, however therapies no longer worked with patient after several months of an in bed therapy and his refusals to mobilize.  Underwent biopsy on 4/7/2023 without evidence of infection.  He was transferred back to [facility] for left total hip arthroplasty and reimplantation on 5/26/23 with Dr. Meyers.  Presented again to [facility] for persistent MRSA bacteremia on 6/17.     Infectious diseases was previously consulted given the bacteremia secondary to the PJI. ID signed off on 7/9 as cultures were finalized growing MRSA, C. Acnes, and pseudomonas - plan for 6 weeks of antibiotic therapy to end on 8/1/2023. Patient has been progressively encephalopathic with rising BUN, creatinine with declining urine out put concerning that dialysis may be in his furute. Care conference on 7/21 with plan for 2 weeks of dialysis to see what recovery or mental status changes may occur.    Given continued encephalopahty and rising creatinine though, UA obtained. Reflexed to culture and is positive for VRE. Currently, he has no urinary symptoms concerning for VRE causing disease.     Patient was feeling well when I saw him this morning. No fevers, chills, sweats, chest pain.          " Review of Systems:   unobtainable         Current Medications:      acetaminophen  975 mg Oral Q8H    amLODIPine  5 mg Oral Daily    [Held by provider] apixaban ANTICOAGULANT  2.5 mg Oral BID    atorvastatin  40 mg Oral Daily    bisacodyl  10 mg Rectal Once    cefTAZidime  2 g Intravenous Q24H    ferrous sulfate  325 mg Oral Every Other Day    folic acid  1 mg Oral Daily    heparin ANTICOAGULANT  5,000 Units Subcutaneous Q12H    heparin lock flush  5-20 mL Intracatheter Q24H    insulin aspart  1-7 Units Subcutaneous TID AC    insulin aspart  1-5 Units Subcutaneous At Bedtime    lactobacillus rhamnosus (GG)  1 capsule Oral BID    miconazole   Topical BID    multivitamin w/minerals  1 tablet Oral Daily    senna-docusate  1 tablet Oral BID    sodium chloride (PF)  10 mL Intracatheter Q8H    sodium chloride (PF)  10-40 mL Intracatheter Q8H    vancomycin place montilla - receiving intermittent dosing  1 each Intravenous See Admin Instructions    cholecalciferol  50 mcg Oral Daily       @         Allergies:     Allergies   Allergen Reactions    Sulfa Antibiotics      Other reaction(s): *Unknown - Childhood Rxn    Tizanidine Other (See Comments)     Frequent urination; causes drowsiness, and dry mouth              Physical Exam:   Vitals were reviewed  Patient Vitals for the past 24 hrs:   BP Temp Temp src Pulse Resp SpO2 Weight   07/26/23 1230 -- -- -- -- -- -- 134.4 kg (296 lb 6.4 oz)   07/26/23 0750 (!) 155/68 98  F (36.7  C) Oral 85 16 95 % --   07/26/23 0608 (!) 184/74 -- -- -- -- -- --   07/26/23 0606 (!) 173/70 -- -- -- -- -- --   07/26/23 0219 (!) 174/74 98  F (36.7  C) Oral 86 16 95 % --   07/26/23 0100 -- -- Oral 89 -- -- --         Physical Examination:  GENERAL:  well-developed, well-nourished, in bed in no acute distress.   HEENT:  Head is normocephalic, atraumatic   EYES:  Eyes have anicteric sclerae without conjunctival injection or stigmata of endocarditis.    ENT:  Oropharynx is moist without exudates or  ulcers. Tongue is midline  NECK:  Supple. No cervical lymphadenopathy  LUNGS:  Clear to auscultation bilateral.   CARDIOVASCULAR:  Regular rate and rhythm with no murmurs, gallops or rubs.  ABDOMEN:  Normal bowel sounds, soft, nontender. No appreciable hepatosplenomegaly  MSK: Left hip incision undressed and appears to be well-healed.  SKIN:  No acute rashes.  Line(s) are in place without any surrounding erythema or exudate. No stigmata of endocarditis.  NEUROLOGIC:  Grossly nonfocal. Active x4 extremities         Laboratory Data:     Inflammatory Markers    Recent Labs   Lab Test 06/26/23  0513 02/06/23  0839 01/30/23  0835 01/23/23  0748 01/16/23  0748 01/09/23  1212 01/02/23  0834 12/28/22  0615 12/19/22  0905   SED 1  --   --   --   --  77*  --   --  86*   CRP  --  3.6 <2.9 <2.9 <2.9 <2.9 5.3 <2.9 6.1         Hematology Studies    Recent Labs   Lab Test 07/26/23  0805 07/20/23  0538 07/11/23  0656 07/10/23  1316 07/09/23  1145 07/08/23  0851   WBC 9.5 8.4 8.5 9.0 11.8* 9.0   HGB 7.8* 8.0* 8.1* 8.2* 8.2* 7.5*   MCV 94 94 96 95 96 95    251 262 269 299 286         Metabolic Studies     Recent Labs   Lab Test 07/26/23  0805 07/25/23  0735 07/24/23  0507 07/23/23  0825 07/22/23  0747    145 143 143 144   POTASSIUM 3.8 3.8 3.8 4.1 4.1   CHLORIDE 107 108* 106 104 106   CO2 20* 22 22 22 23   BUN 56.8* 57.7* 56.9* 57.4* 57.0*   CR 5.81* 6.05* 5.88* 5.80* 5.71*   GFRESTIMATED 10* 9* 10* 10* 10*         Hepatic Studies    Recent Labs   Lab Test 07/26/23  0805 07/25/23  0735 07/24/23  0507 07/23/23  0825 07/22/23  0747 07/21/23  1125 07/06/23  1708 07/06/23  0713 07/04/23  0627 06/22/23  0711 06/20/23  0704 06/19/23  0915 06/16/23  0605 06/14/23  0651   BILITOTAL  --   --   --   --   --   --   --  <0.2 0.2  --  0.2 0.2 0.2 0.2   ALKPHOS  --   --   --   --   --   --   --  103 99  --  93 94 114 110   ALBUMIN 3.0* 3.1* 3.3* 3.2* 3.0* 3.0*   < > 2.4*  2.4* 2.6*   < > 2.3* 2.6* 2.8* 2.8*   AST  --   --   --   --    --   --   --  9 11  --  14 19 16 16   ALT  --   --   --   --   --   --   --  12 14  --  14 12 10 8    < > = values in this interval not displayed.         Microbiology:  Culture   Date Value Ref Range Status   07/19/2023 10,000-50,000 CFU/mL Enterococcus faecium VRE (A)  Final     Comment:          06/21/2023 1+ Cutibacterium (Propionibacterium) acnes (A)  Final     Comment:     Susceptibility testing of Cutibacterium (Propionibacterium) species is not done from this source. This organism is susceptible to penicillin and cefotaxime and most are susceptible to clindamycin.   06/21/2023 No anaerobic organisms isolated  Final   06/21/2023 No anaerobic organisms isolated  Final   06/21/2023 No anaerobic organisms isolated  Final   06/21/2023 1+ Staphylococcus aureus (A)  Final     Comment:     Susceptibilities done on previous cultures   06/21/2023 1+ Pseudomonas aeruginosa (A)  Final   06/21/2023 1+ Staphylococcus aureus MRSA (A)  Final   06/21/2023 2+ Staphylococcus aureus (A)  Final     Comment:     Susceptibilities done on previous cultures   06/21/2023 1+ Pseudomonas aeruginosa (A)  Final     Comment:     Susceptibilities done on previous cultures   06/21/2023 1+ Staphylococcus aureus (A)  Final     Comment:     Susceptibilities done on previous cultures   06/21/2023 2+ Staphylococcus aureus (A)  Final     Comment:     Susceptibilities done on previous cultures   06/21/2023 No anaerobic organisms isolated  Final   06/21/2023 2+ Staphylococcus aureus MRSA (A)  Final   06/20/2023 No Growth  Final   06/20/2023 No Growth  Final   06/19/2023 No Growth  Final   06/19/2023 No Growth  Final   06/17/2023 4+ Staphylococcus aureus MRSA (A)  Final   06/17/2023 2+ Corynebacterium striatum (A)  Final     Comment:     Identification obtained by MALDI-TOF mass spectrometry research use only database. Test characteristics determined and verified by the Infectious Diseases Diagnostic Laboratory.   06/17/2023 1+ Klebsiella pneumoniae  (A)  Final   06/17/2023 No anaerobic organisms isolated  Final   06/17/2023 Positive on the 1st day of incubation (A)  Final   06/17/2023 Staphylococcus aureus MRSA (AA)  Final     Comment:     1 of 2 bottlesSusceptibilities done on previous cultures   06/17/2023 Positive on the 1st day of incubation (A)  Final   06/17/2023 Staphylococcus aureus MRSA (AA)  Final     Comment:     1 of 2 bottlesSusceptibilities done on previous cultures   06/17/2023 <10,000 CFU/mL Urogenital marimar  Final   06/16/2023 Positive on the 1st day of incubation (A)  Final   06/16/2023 Staphylococcus aureus MRSA (AA)  Final     Comment:     1 of 2 bottlesSusceptibilities done on previous cultures   06/16/2023 Positive on the 1st day of incubation (A)  Final   06/16/2023 Staphylococcus aureus MRSA (AA)  Final     Comment:     1 of 2 bottlesSusceptibilities done on previous cultures   06/15/2023 Positive on the 1st day of incubation (A)  Final   06/15/2023 Staphylococcus aureus MRSA (AA)  Final     Comment:     2 of 2 bottlesSusceptibilities done on previous cultures   06/15/2023 Positive on the 1st day of incubation (A)  Final   06/15/2023 Staphylococcus aureus MRSA (AA)  Final     Comment:     2 of 2 bottlesSusceptibilities done on previous cultures   06/14/2023 Positive on the 1st day of incubation (A)  Final   06/14/2023 Staphylococcus aureus MRSA (AA)  Final     Comment:     2 of 2 bottlesSusceptibilities done on previous cultures   06/14/2023 Positive on the 1st day of incubation (A)  Final   06/14/2023 Staphylococcus aureus MRSA (AA)  Final     Comment:     2 of 2 bottles     05/26/2023 No anaerobic organisms isolated  Final   05/26/2023 No anaerobic organisms isolated  Final   05/26/2023 No anaerobic organisms isolated  Final   05/26/2023 No anaerobic organisms isolated  Final   05/26/2023 No Growth  Final   05/26/2023 No Growth  Final   05/26/2023 No Growth  Final   05/26/2023 No Growth  Final   05/26/2023 No anaerobic organisms  isolated  Final   05/26/2023 No Growth  Final   04/07/2023 No anaerobic organisms isolated  Final   04/07/2023 No Growth  Final   04/07/2023 No anaerobic organisms isolated  Final   04/07/2023 No Growth  Final   04/07/2023 No anaerobic organisms isolated  Final   04/07/2023 No Growth  Final   04/07/2023 No anaerobic organisms isolated  Final   04/07/2023 No Growth  Final   04/07/2023 No anaerobic organisms isolated  Final   04/07/2023 No Growth  Final   04/07/2023 No anaerobic organisms isolated  Final   04/07/2023 No Growth  Final   11/22/2022 4+ Finegoldia magna (A)  Final     Comment:     Susceptibilities done on previous cultures   11/22/2022 No Growth  Final   11/22/2022 1+ Proteus mirabilis (A)  Final     Comment:     Susceptibilities done on previous cultures   11/22/2022 1+ Finegoldia magna (A)  Final     Comment:     Susceptibilities done on previous cultures   11/22/2022 No Growth  Final   11/22/2022 1+ Proteus mirabilis (A)  Final   11/22/2022 No anaerobic organisms isolated  Final   11/22/2022 No anaerobic organisms isolated  Final   11/22/2022 No anaerobic organisms isolated  Final   11/22/2022 No Growth  Final   11/22/2022 No Growth  Final   11/22/2022 No Growth  Final   11/22/2022 Isolated in broth only Proteus mirabilis (A)  Final     Comment:     On day 1 of incubationSusceptibilities done on previous cultures   11/22/2022 Isolated in broth only Proteus mirabilis (A)  Final     Comment:     On day 1 of incubationSusceptibilities done on previous cultures   11/22/2022 1+ Proteus mirabilis (A)  Final   11/22/2022 No anaerobic organisms isolated  Final   11/22/2022 No Growth  Final   11/22/2022 1+ Proteus mirabilis (A)  Final     Comment:     Susceptibilities done on previous cultures   11/14/2022 4+ Porphyromonas species (A)  Final     Comment:     Beta Lactamase Positive  Identification obtained by MALDI-TOF mass spectrometry research use only database. Test characteristics determined and verified by  the Infectious Diseases Diagnostic Laboratory.   11/14/2022 1+ Finegoldia magna (A)  Corrected   11/14/2022 3+ Proteus mirabilis (A)  Final   11/14/2022 No Growth  Final       Urine Studies    Recent Labs   Lab Test 07/19/23  0957 06/30/23  2041 06/22/23  1457 06/17/23  0817   LEUKEST Small* Small* Large* Moderate*   WBCU 27* 3 66* 14*         Vancomycin Levels    Recent Labs   Lab Test 07/26/23  0805 07/21/23  1125 07/16/23  1014   VANCOMYCIN 24.6 22.4 22.2         Hepatitis B Testing No lab results found.  Hepatitis C Testing   No results found for: HCVAB, HQTG, HCGENO, HCPCR, HQTRNA, HEPRNA  Respiratory Virus Testing    No results found for: RS, FLUAG

## 2023-07-26 NOTE — PHARMACY-VANCOMYCIN DOSING SERVICE
Pharmacy Vancomycin Note  Date of Service 2023  Patient's  1950   72 year old, male    Indication: Bacteremia  Day of Therapy: Started 6/15/23  Current vancomycin regimen:  Intermittent based on levels. Last dose 23  Current vancomycin monitoring method: Trough (Method 2 = manual dose calculation)  Current vancomycin therapeutic monitoring goal: 15-20 mg/L      Current estimated CrCl = Estimated Creatinine Clearance: 16 mL/min (A) (based on SCr of 5.81 mg/dL (H)).    Creatinine for last 3 days  2023:  5:07 AM Creatinine 5.88 mg/dL  2023:  7:35 AM Creatinine 6.05 mg/dL  2023:  8:05 AM Creatinine 5.81 mg/dL    Recent Vancomycin Levels (past 3 days)  2023:  8:05 AM Vancomycin 24.6 ug/mL    Vancomycin IV Administrations (past 72 hours)        No vancomycin orders with administrations in past 72 hours.                    Nephrotoxins and other renal medications (From now, onward)      Start     Dose/Rate Route Frequency Ordered Stop    23  vancomycin place montilla - receiving intermittent dosing         1 each Intravenous SEE ADMIN INSTRUCTIONS 23                 Contrast Orders - past 72 hours (72h ago, onward)      None            Interpretation of levels and current regimen:  Vancomycin level is reflective of supratherapeutic level    Has serum creatinine changed greater than 50% in last 72 hours: No    Urine output:  unable to determine    Renal Function: Worsening    Plan:  Will hold on dose today. Recheck level tomorrow.  Vancomycin monitoring method: Trough (Method 2 = manual dose calculation)  Vancomycin therapeutic monitoring goal: 15-20 mg/L  Pharmacy will check vancomycin levels as appropriate in 1-3 Days.  Serum creatinine levels will be ordered daily for the first week of therapy and at least twice weekly for subsequent weeks.    Julio Howard [facility]

## 2023-07-26 NOTE — PLAN OF CARE
"VS: BP (!) 176/74 (BP Location: Left arm, Patient Position: Supine)   Pulse 86   Temp 98  F (36.7  C) (Oral)   Resp 16   Ht 1.778 m (5' 10\")   Wt 132.2 kg (291 lb 6.4 oz)   SpO2 95%   BMI 41.81 kg/m      O2: O2 SATS >90% denies chest pain,  or SBO   Output: Voids adequately using urinal   Last BM: 7/24 23 BS present all x4 quadrants    Activity: Not OOB this shift AO3  with cares    Up for meals? N/A   Skin: L hip surgical incision,scalling/ scab to bilateral lower foot, readiness to coccyx barrier  wound to left heel ,   Pain: Pain managed with prn oxycodone    CMS: intermittent confusion and difficulty word finding,  disoriented to time and and place  been asking staff to call police to let him know where he is.redirected couple of time.   Dressing: CDI    Diet: Regular diet diabetic with BG checks and sliding scale    LDA: Right CVC, Heparin locked.    Equipment: IV Pole,  celling  lift personal belongings   Plan: Continue with plan of care    Additional Info: BP elevated , cross cover notified, per cross cover advised   staff to check BP in am  and notify provider if still high.                            "

## 2023-07-26 NOTE — PLAN OF CARE
[id number]:    No significant changes. Pt disoriented to place and situation but oriented to self and time. Incontinent of bladder this shift -- linen changes and hygiene cares completed prn. Pt scratching at his skin, stating that he feels very itchy -- excoriations visible on patient's bilateral arms and flanks, moisturizer applied. Dinner tray set up. Continue POC.

## 2023-07-26 NOTE — PROGRESS NOTES
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 on 11/22/2022 for scheduled explant of left hip prosthesis, debridement, and reconstruction with antibiotic spacer.  Postop course complicated by profound deconditioning.  He was admitted to TCU initially on 12/23/2022 for physical rehabilitation, however there was no progress with therapies after several months of in bed therapy and his refusals to mobilize. Underwent biopsy on 4/7/2023 without evidence of infection.  He was transferred back to [facility] for left total hip arthroplasty and reimplantation on 5/26/23 with Dr. Meyers.  Presented again to [place] for persistent MRSA bacteremia on 6/17.     Brief Hospital Summary:    Pt was admitted to continue infectious workup with MRSA bacteremia.  Admission blood cultures were positive, ID and Ortho were consulted. KEILA was obtained on 6/19, which was negative for endocarditis, ortho took for washout/debridement of hip on 6/21. ID recommending 6 weeks IV abx (6/21-8/1) via tunneled internal jugular line (nephrology asked to defer PICC) followed by 6 weeks of PO cipro/doxy. He has developed persistent encephalopathy that is felt to be multifactorial. Neurology consulted and there was concern for contribution of cefepime neurotoxicity. EEG was negative for seizures and CT head negative. He did have some mild improvement after stopping cefepime, but has not returned to baseline. Complicating the situation further is that he has developed worsening acute renal failure with now concern for uremia contributing to his mental status. Care conference on 7/21 with brother Ivan  as surrogate decision maker with plan for short trial of dialysis to determine if pt has any meaningful improvement.    Today:  - Follow up nephro recs: still holding on HD initiation  - Defer further IVF to nephro  - Cont IV vanc and ceftazidime   - Cont subQ heparin in place of DOAC in case needs AC held for line + HD initiation in coming days   - Kidney function stable to slightly improved today, lytes and labs stable otherwise     Goals of Care  Complex medical course over the past 6+ months. Was in [place] [facility] since 12/2022 due to profound deconditioning, but ultimately refused to participate meaningfully in therapies. Was readmitted in 6/23 with MRSA bacteremia after repeat hip surgery in 4/23. His hospital course has been complicated by acute renal failure, multifactorial encephalopathy and apathy/refusal to participate in therapies or medical cares. He was assessed by psychiatry on 7/18 and felt to be non-decisional regarding complex medical decisions or disposition. His brother (Ivan) is his legal POA, but Don does not have a healthcare directive. Care conference held on 7/21 with medicine, palliative, renal, SW, RN manager - discussed the above and options including a short trial (~2 weeks) of dialysis to see if it improves his mental function and willingness/ability to participate in therapies vs moving towards a comfort-based approach. Ultimately, Ivan decided to move forward with the trial of dialysis and a plan to reassess in ~2 weeks to determine next steps. We discussed that if he does not meaningfully improve, outpatient dialysis would likely not be an option due to his profound immobility.  - Plan to move forward with short trial of dialysis.  - Will monitor for improvement with a plan to reassess after dialysis trial to make next decisions regarding goals of care.  - Continue full code for now. Ivan to discuss more with his brothers.    Multifactorial encephalopathy - likely toxic metabolic,  uremic, delirium, depression contributing  Noted to have acutely worsening mental status around ~7/4, which was initially felt to be related to cefepime neurotoxicity. Neurology consulted. EEG without seizures. CT head, ammonia and VBG wnl. Completed 5 days of antibiotics. No clinical or objective findings concerning for worsening infection. Does have worsening renal failure with rising BUN, so some consideration that uremia could be contributing, but overall not hugely elevated. Also concern that depression is contributing.  - Delirium precautions  - Nephrology following  - After care conference on 7/21, will plan for a short trial of inpatient dialysis starting early next week to see if it improves his mental status  - Psychiatry consulted on 7/18  for capacity assessment - pt not decisional regarding complex medical decisions or disposition.    MRSA bacteremia due to possible left hip septic arthritis vs other source Blood cultures: 6/14, 6/15 + for MRSA (4/4), and 6/16 (1/2 GPCs) and 6/17 (2/2) GPCs. - started vancomycin 6/15.   6/17 sinovial fluid cultures with staph and klebsiella. Purulent drainage noted at incision site at TCU, CT left hip concerning for infection (no mention of joint, mostly anterior in soft tissue) and couldn't exclude abscess/inf  - ID intermittently following through hospital course. Last seen 7/21.  - Continue ceftazidime, vancomycin for C.acnes, Pseudomonas, MRSA PJI until 8/1/2023  - Start oral doxycycline and ciprofloxacin on 8/2/2023 for an additional 6 weeks of therapy (total duration = 12 weeks)    Acute Kidney Injury / Acute renal failure - multifactorial, see above/below  Nephrology consulted, appreciate recs. Baseline creatinine 1.0 or 1.1. Etiology: suspect combo of sepsis/inflammation, hypovolemia, blood loss anemia following surgery, vancomycin. UA recollected on 7/19 with granular casts suggesting ongoing dense ATN. Creatinine continuing to uptrend, however no acidosis,  electrolyte abnormalities or evidence of hypervolemia. Some concern that uremia could be contributing to his encephalopathy. After GOC on 7/21, planned to pursue short trial of dialysis.  - Nephrology consulted  - Initially planned for short trial of HD however nephro holding off on line placement and HD as they think pt has impending renal recovery.   - See above for GOC converation.  Lab Results   Component Value Date    CR 5.81 (H) 07/26/2023    CR 6.05 (H) 07/25/2023    CR 5.88 (H) 07/24/2023    CR 5.80 (H) 07/23/2023    CR 5.71 (H) 07/22/2023       Asymptomatic bacteruria (VRE)  UA obtained on 7/19 to assess for casts, reflexed to culture which is growing VRE. Labs and vitals with low concern for new infection. Discussed with ID who recommended against treatment.    Bilateral LE edema due to volume overload from HALEY and iatrogenic from blood transfusions  Hyperesthesia of bilateral lower extremities (no signs of cellulitis as of 7/13)  - Holding diuresis starting 7/20    L heel pressure wound, unstagable  Noted earlier in pts hospitalization (~6/20), but at that time he refused assessment. Noted again by nursing to be worsening on 7/19 and WOCN consult was placed. Pt was agreeable and had wound assessed. He has been mostly non-agreeable to offloading or other preventative measures recommended by RN and WOCN, but will sometimes allow it with significant encouragement.  - WOCN consult  - Continue to encourage pt to utilize offloading techniques and to participate in wound cares.    Hypernatremia - resolved  Uptrending recently, 147 on 7/18. Likely volume down. S/p D5W on 7/18, resolved.  - Monitor. Holding diuretics.    Hypoxemic Respiratory Failure due to immobility, surgery - resolved  - continue to follow; encourage IS    Acute blood loss anemia following surgery, complicated by use of blood thinner  - 3 units day after surgery did joint washout (6/22) for acute blood loss anemia, 1 unit 6/25  - watch for  bleeding; hold apixaban if need be - had unprovoked dvt in 2018 and would be on eliquis for life, high risk of recurrent DVT given recent procedures and illness/immobility  - iron and folic acid daily    S/p left hip explantation of left hip antibiotic spacer and revision of left total hip arthroplasty (5/26/2023)  - ortho performed washout 6/21  -Activity: 50% PWB LLE with posterior hip precautions [id number]  -AFO ordered for foot drop but patient refusing  -PT/OT  -Knee to be kept even in a foam leg elevator to relax sciatic nerve  -Pain: holding robaxin 4 times a day with mental status changes (discontinuing), APAP PRN  -stop tramadol (7/11) due to renal function  -increase oxycodone 2.5-5 mg q6h PRN- taking very minimal amts so unlikely to be contributing to mental status    Hx of unprovoked DVT in 2018   - Continue heparin subcutaneous BID pending line placement on 7/24  - Hold PTA apixiban     Type 2 diabetes mellitus  - Last hemoglobin A1c 5.6% on 6/16/2023.  On glipizide, metformin, Actos, and Victoza prior to hospitalizations and surgery.   - continue ISS    Constipation, improved  -Continue Colace 100 mg twice daily  -Continue senna 1 tablet twice daily-- had briefly increased to 2 tab twice daily due to lack of stool, now improved  -Continue MiraLAX daily     Hypomagnesemia  -monitor and replete      Severe malnutrition in the context of acute on chronic illness  Goal for patient is to consume % of meals TID. Goal is 5993-8237 kcals/daily. Protein needs- 100-126 grams/daily.   -RD consult and appreciate recs  -regular diet  -Weekly weights, daily I/O     Adjustment disorder with depressed mood  Prolonged grief disorder  Patient has been with depressed mood in setting of ongoing health issues, as evidenced by lack of motivation to work with therapies during both TCU admissions.  Past history of passive suicidal statements. Psychiatry saw patient during hospital admission on 5/31 due to lack of  "motivation with therapies. Patient declined interest in starting any psychiatric medications. Was willing to engage with health psychology.   -Outpatient health psychology consult  -Continue to monitor for willingness to start antidepressant therapy  -tried to discuss mood 7/15 but he didn't fully answer questions, seemed to talk about different aspect of his health that wasn't quite related to mood     HTN:   - Started amlodipine 7/16    Stable and Resolved Issues:  Hyperlipidemia-continue PTA Lipitor 40 mg daily  JAIR-CPAP when sleeping       Diet: Renal Diet (non-dialysis)    DVT Prophylaxis: Heparin SQ  Tran Catheter: Not present  Lines: PRESENT      CVC Single Lumen Right Internal jugular Non - valved (open ended);Tunneled;Power injectable-Site Assessment: WDL      Cardiac Monitoring: None  Code Status: Full Code      Clinically Significant Risk Factors              # Hypoalbuminemia: Lowest albumin = 2.2 g/dL at 7/8/2023  8:51 AM, will monitor as appropriate    # Acute Kidney Injury, unspecified: based on a >150% or 0.3 mg/dL increase in last creatinine compared to past 90 day average, will monitor renal function         # Severe Obesity: Estimated body mass index is 41.81 kg/m  as calculated from the following:    Height as of this encounter: 1.778 m (5' 10\").    Weight as of this encounter: 132.2 kg (291 lb 6.4 oz).   # Severe Malnutrition: based on nutrition assessment         Disposition Plan           Jesse Ring, DO  Hospitalist Service, GOLD TEAM 21  [facility]  Securely message with [employer] (more info)  Text page via [facility] Paging/Directory   See signed in provider for up to date coverage information  ______________________________________________________________________    Interval History   Pt today continues to be confused. He does not remember me which I specifically asked him to do during our last interview. He does not know where he is or what he is " "here for. He denies SOB or any new symptoms but overall feels bad of which he cannot remark on specific symptoms. I asked him to remember my name again for tomorrow and pt said he would not be here tomorrow because he has a \"big to-do\" but could not elaborate on this.     I called his POA / brother Ivan yesterday and gave him and update which he was appreciative of. He plans to come in later this week and does not need daily updates but rather can be called with any significant changes in clinical status or management.     Physical Exam   Vital Signs: Temp: 98  F (36.7  C) Temp src: Oral BP: (!) 155/68 Pulse: 85   Resp: 16 SpO2: 95 % O2 Device: None (Room air)    Weight: 291 lbs 6.4 oz    General Appearance:  NAD. Lying comfortably in bed.  Respiratory: CTAB. No increased WOB.  Cardiovascular: RRR.  GI: Soft. Non-tender. Non-distended.  Skin: No rash.  Other:  Moving all extremities. Normal affect. Intermittent myoclonus.    Medical Decision Making         Data     I have personally reviewed the following data over the past 24 hrs:    9.5  \   7.8 (L)   / 282     144 107 56.8 (H) /  111 (H)   3.8 20 (L) 5.81 (H) \     ALT: N/A AST: N/A AP: N/A TBILI: N/A   ALB: 3.0 (L) TOT PROTEIN: N/A LIPASE: N/A       "

## 2023-07-26 NOTE — PLAN OF CARE
VS: Temp: 97.5  F (36.4  C) Temp src: Oral BP: (!) 167/74 Pulse: 83   Resp: 16 SpO2: 92 % O2 Device: None (Room air)       O2: Pt.'s O2 sats above 90 on room air. Denies shortness of breath, denies chest pain.    Output: Pt. Is asking for urinal with encouragement. Voided in urinal twice, also incontinent of urine for at least one occurrence.    Last BM: 07/24/2023   Activity: Pt. Is able to turn in bed with assist of 2-3, but it took a lot of convincing. Seems to prefer a male NST.    Skin: Unstageable pressure injury on L heel. Pt. Refused all cares on L leg. Peeling skin/excoriation and blanchable redness on sacrum/coccyx/bottom. Pt. Has been peeling skin off of his bottom. Pt. Was educated on why it is important not to peel that skin off. Redness in folds in perineal area, caridad-care partially completed before pt. Requested to stop.    Pain: Pt. Reports pain of 5/10 in lower extremities, given scheduled tylenol.    CMS: A&O x 3, disoriented to time. Intermittent confusion, word finding difficulty while speaking. Pt. Denies numbness and tingling   Dressing: L and R heels with mepilex dressings, CDI. L hip incision open to air. L heel dressing change done on day shift.    Diet: Renal diet. Pt. Refused dinner, very poor appetite. Needs tray set-up, cutting up food.    LDA: L hip incision site, R chest PICC (heparin locked), L and R heel dressings.    Equipment: Pt is wearing rook boots, applied today at start of shift, IV pole, personal belongings, wound care supplies.    Plan: Continue to encourage activity and promote skin integrity, monitor I&O and renal function, plan is to do a hemodialysis trial in 1-2 days.    Additional Info:

## 2023-07-26 NOTE — PROGRESS NOTES
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  Postop course complicated by profound deconditioning.  He was admitted to TCU initially on 12/23/2022 for physical rehabilitation, however therapies no longer worked with patient after several months of an in bed therapy and his refusals to mobilize.  Underwent biopsy on 4/7/2023 without evidence of infection.  He was transferred back to [facility] for left total hip arthroplasty and reimplantation on 5/26/23 with Dr. Meyers.  Presented again to [place] for persistent MRSA bacteremia on 6/17. His course has been c/b HALEY. Baseline creat 1.0 as recent as 6/19/23).        HALEY stage II  Recurrent HALEY in setting of significant hemodynamic changes including diuretics and Vancomycin use   Since his admission, have had multiple HALEY episodes, recently a week ago when he was given diuretics for volume management among others including continue use of vancomycin (level was slightly high on 7/13). Now his creatinine continue to trend up. Also noted pt continued to be altered intermittently. Question about uremic syndrome raised which can not be ruled out at this point with continued renal dysfunction for quite some time.    His renal function is stabilizing from 6.05 to 5.81 today and his eGFR is about 10 ml with stable electrolytes.    Please refer to note of Megan Kessler on 7/21/23 for care conference and decision about HD. See part of the note below:     \" - We discussed that currently Rahat would not meet OP HD criteria given that he is a transfer of 3 and would likely require a bed. The only hope at this time " "of any improvement in his cognitive function, likely a very small chance, is that with dialysis any contribution of uremia may improve and allow him to improve his cognition and either be clear about wanting dialysis and or become more engaged in therapies so that he could be a candidate for OP HD.     - The decision by brother Ivan was to go ahead with a 2 wk trial of HD and if at the end of the 2 wks no change in his cognitive status, discontinue HD. If Don improves on HD then he would ultimately remain inpatient until he was strong enough to meet OP HD criteria.     - Will plan on initiating HD on Monday. IR orders have been placed. Primary team discontinuing Apixiban and switching to Heparin   - Continue to monitor for renal recovery with daily chemistry labs/strict I/O and daily weights\"    - continue holding diuresis   - 1 L of LR (ordered)  - increase po water intake as able   - will monitor closely for further recovery     Volume status - No edema/dyspnea/hypoxia. Bp 160'/. Had 575 ml plus unmeasured occurrences yesterday. (Using a urinal but also incontinent).    - Furosemide 40 mg every day was discontinued 7/20/23 w/o improvement    - Please weigh daily. Zero bed    - Continue I/O     3. HTN - Borderline control in HALEY. Bp 150-160's/. No edema  Current regimen: Amlodipine 5 mg every day    - Avoid hypotension. B/p ok at this time   - continue holding diuretic     4. MRSA bacteremia- Will be completing 6 wks of Vancomycin on 8/1/23. Continue Fortaz.    - UC with VRE but only 10-50 k. No treatment required   - Per ID     5. Electrolytes - No acute issues today. Na 144, K 3.8   - Given recent hypernatremia continue to encourage fluid intake     6 Acid base - bicarb a little lower today at 20,   - will monitor closely     7. BMD - Ca 8.4 Phos 5.1, down from 6.2, albumin 3   - Malnutrition present: Getting MVI. Would benefit from dietary supplement   - No need for Phos binder at this time   - Vit D 16   - " "Began D3 - 50 mcq every day 7/18   - Switched to Renal diet 7/19     8. Anemia - Hgb 7.8   - Etiology acute illness   - On Iron, folic acid   - Iron studies 6/26/23: Ferritin 2616, Fe 30 IS 21   - Does have DVT history on Eliquis - switched to heparin   - Ferritin too high for IV iron   - Transfusions per primary team      Recommendations were communicated to primary team verbally.     Seen and discussed with Dr. Bryan.     OPAL MAYER, PADeeptiC   Division of Renal Disease and Hypertension  [facility]  fredmail  [employer] Web Console    Interval History :   Nursing and provider notes from last 24 hours reviewed.  In the last 24 hours Waldo Rodriguezy been hemodynamically stable. No new symptoms provided today. He is agitated when asked to drink more water. His UOP did increase to 575 ml with 2 unmeasured episodes yesterday. He has no n/v/d. No shortness of breath.    Review of Systems:   As noted above in interval history    Physical Exam:   I/O last 3 completed shifts:  In: 1160 [P.O.:1160]  Out: 475 [Urine:475]   BP (!) 155/68 (BP Location: Left arm)   Pulse 85   Temp 98  F (36.7  C) (Oral)   Resp 16   Ht 1.778 m (5' 10\")   Wt 132.2 kg (291 lb 6.4 oz)   SpO2 95%   BMI 41.81 kg/m       General : Pt awake, not in acute distress   Lungs : no respiratory distress  Cardiac : RRR  LE :trace Edema noted  Dialysis Access : N/A    Labs:   All labs reviewed by me  Electrolytes/Renal -   Recent Labs   Lab Test 07/26/23  0805 07/26/23  0748 07/25/23  2209 07/25/23  0800 07/25/23  0735 07/24/23  0816 07/24/23  0507 07/12/23  1209 07/12/23  0911 07/05/23  0823 07/05/23  0700 07/02/23  1754 07/02/23  1149     --   --   --  145  --  143   < > 143   < > 142   < > 137   POTASSIUM 3.8  --   --   --  3.8  --  3.8   < > 4.1   < > 4.5   < > 4.3   CHLORIDE 107  --   --   --  108*  --  106   < > 112*   < > 108*   < > 107   CO2 20*  --   --   --  22  --  22   < > 19*   < > 16*   < > 21*   BUN 56.8*  --   --   --  57.7*  --  56.9* "   < > 43.6*   < > 46.3*   < > 39.1*   CR 5.81*  --   --   --  6.05*  --  5.88*   < > 4.44*   < > 3.30*   < > 2.20*   * 107* 104*   < > 102*   < > 125*   < > 105*   < > 99   < > 128*   MAIKOL 8.4*  --   --   --  8.6*  --  8.7*   < > 8.3*   < > 7.7*   < > 7.9*   MAG  --   --   --   --   --   --   --   --  1.6*  --  1.7  --  1.7   PHOS 5.1*  --   --   --  6.2*  --  5.9*   < > 5.0*   < >  --   --   --     < > = values in this interval not displayed.       CBC -   Recent Labs   Lab Test 07/26/23  0805 07/20/23  0538 07/11/23  0656   WBC 9.5 8.4 8.5   HGB 7.8* 8.0* 8.1*    251 262       LFTs -   Recent Labs   Lab Test 07/26/23  0805 07/25/23  0735 07/24/23  0507 07/06/23  1708 07/06/23  0713 07/04/23  0627 06/22/23  0711 06/20/23  0704   ALKPHOS  --   --   --   --  103 99  --  93   BILITOTAL  --   --   --   --  <0.2 0.2  --  0.2   ALT  --   --   --   --  12 14  --  14   AST  --   --   --   --  9 11  --  14   PROTTOTAL  --   --   --   --  5.3* 5.9*  --  5.9*   ALBUMIN 3.0* 3.1* 3.3*   < > 2.4*  2.4* 2.6*   < > 2.3*    < > = values in this interval not displayed.       Iron Panel -   Recent Labs   Lab Test 06/26/23  0513   IRON 30*   IRONSAT 21   FADI 2,616*       Current Medications:   acetaminophen  975 mg Oral Q8H    amLODIPine  5 mg Oral Daily    [Held by provider] apixaban ANTICOAGULANT  2.5 mg Oral BID    atorvastatin  40 mg Oral Daily    bisacodyl  10 mg Rectal Once    cefTAZidime  2 g Intravenous Q24H    ferrous sulfate  325 mg Oral Every Other Day    folic acid  1 mg Oral Daily    heparin ANTICOAGULANT  5,000 Units Subcutaneous Q12H    heparin lock flush  5-20 mL Intracatheter Q24H    insulin aspart  1-7 Units Subcutaneous TID AC    insulin aspart  1-5 Units Subcutaneous At Bedtime    lactobacillus rhamnosus (GG)  1 capsule Oral BID    miconazole   Topical BID    multivitamin w/minerals  1 tablet Oral Daily    senna-docusate  1 tablet Oral BID    sodium chloride (PF)  10 mL Intracatheter Q8H    sodium  chloride (PF)  10-40 mL Intracatheter Q8H    vancomycin place montilla - receiving intermittent dosing  1 each Intravenous See Admin Instructions    cholecalciferol  50 mcg Oral Daily       OPAL MAYER PA-C

## 2023-07-26 NOTE — PLAN OF CARE
"  VS: BP (!) 155/68 (BP Location: Left arm)   Pulse 85   Temp 98  F (36.7  C) (Oral)   Resp 16   Ht 1.778 m (5' 10\")   Wt 134.4 kg (296 lb 6.4 oz)   SpO2 95%   BMI 42.53 kg/m       O2: Room air   Output: Incontinent of B&B, attempts to use urinal   Last BM: 7/23/23   Activity: Patient in bed, assist for bed mobility   Skin: Pale, moist at times, many areas of scratch, bruise or redness documented   Pain: Pain complaints vary   CMS: Decreased circulation to LE bilat. Patient reluctant to allow nursing to examine, complains of pain and gets upset   Dressing: Bilat heels   Diet: Renal, diabetic   LDA: Right port, chest   Equipment: Rooke boots   Plan: Continue to monitor labs and patient condition   Additional Info: Patient is mostly pleasant, confused at times, easily redirected. Incontinent of B&B, attempts to use urinal at times. Poor appetite at lunch. Bolus fluids given today per MD order.                             "

## 2023-07-27 NOTE — PHARMACY-VANCOMYCIN DOSING SERVICE
Pharmacy Vancomycin Note  Date of Service 2023  Patient's  1950   72 year old, male    Indication: Bacteremia  Day of Therapy: Started 6/15/23  Current vancomycin regimen:  Intermittent based on levels. Last dose 23   Current vancomycin monitoring method: Trough (Method 2 = manual dose calculation)  Current vancomycin therapeutic monitoring goal: 15-20 mg/L    Current estimated CrCl = Estimated Creatinine Clearance: 16.3 mL/min (A) (based on SCr of 5.75 mg/dL (H)).    Creatinine for last 3 days  2023:  7:35 AM Creatinine 6.05 mg/dL  2023:  8:05 AM Creatinine 5.81 mg/dL  2023:  8:03 AM Creatinine 5.75 mg/dL    Recent Vancomycin Levels (past 3 days)  2023:  8:05 AM Vancomycin 24.6 ug/mL  2023:  8:03 AM Vancomycin 21.3 ug/mL    Vancomycin IV Administrations (past 72 hours)        No vancomycin orders with administrations in past 72 hours.                    Nephrotoxins and other renal medications (From now, onward)      Start     Dose/Rate Route Frequency Ordered Stop    23 0800  vancomycin (VANCOCIN) 1000 mg in dextrose 5% 200 mL PREMIX         1,000 mg  200 mL/hr over 1 Hours Intravenous ONCE 23 1036      23 0813  vancomycin place montilla - receiving intermittent dosing         1 each Intravenous SEE ADMIN INSTRUCTIONS 23 0813                 Contrast Orders - past 72 hours (72h ago, onward)      None            Interpretation of levels and current regimen:  Vancomycin level is reflective of supratherapeutic level    Has serum creatinine changed greater than 50% in last 72 hours: No    Urine output:  unable to determine    Renal Function: Improving      Plan:  Expect patient's vancomycin level to drift down tomorrow into a range which warrants redosing. Will give 1000mg IV once tomorrow. Plan is to transition from vancomycin to doxycycline on 23. Unless patient's renal function improves significantly, do not anticipate patient will need another  dose of vancomycin after tomorrow.   Vancomycin monitoring method: Trough (Method 2 = manual dose calculation)  Vancomycin therapeutic monitoring goal: 15-20 mg/L  Pharmacy will check vancomycin levels as appropriate in 3-5 Days.  Serum creatinine levels will be ordered daily for the first week of therapy and at least twice weekly for subsequent weeks.    Julio Hoawrd RPH

## 2023-07-27 NOTE — PROGRESS NOTES
"  VS: /79 (BP Location: Left arm, Patient Position: Supine, Cuff Size: Adult Large)   Pulse 91   Temp 97.6  F (36.4  C) (Oral)   Resp 20   Ht 1.778 m (5' 10\")   Wt 134.4 kg (296 lb 6.4 oz)   SpO2 90%   BMI 42.53 kg/m      O2: RA   Output: Urine incontinence   Last BM: Fecal incontinence   Activity: Not OOB, bedbound   Skin: Multiple wounds, see charting   Pain: intermittent   CMS: intact   Dressing:    Diet: renal   LDA: Single internal jugular    Equipment: Room lift   Plan: Monitor pain, mental status, labs   Additional Info:         Rahat became very combative this afternoon & refused the IV bolus that was ordered along with his PO meds.  He was yelling at staff and grabbed the hand of one aid and pulled on her.  He was swinging at staff.  He refused to be cleaned up or turned in his bed.  He refused to have his Rooke boots removed so that this RN could assess his heels.   3 staff members were able to remove his brief, apply barrier cream and put on a clean brief.  MD was notified.      Rahat declined dinner tonight.  "

## 2023-07-27 NOTE — PROGRESS NOTES
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  Postop course complicated by profound deconditioning.  He was admitted to TCU initially on 12/23/2022 for physical rehabilitation, however therapies no longer worked with patient after several months of an in bed therapy and his refusals to mobilize.  Underwent biopsy on 4/7/2023 without evidence of infection.  He was transferred back to [facility] for left total hip arthroplasty and reimplantation on 5/26/23 with Dr. Meyers.  Presented again to [facility] for persistent MRSA bacteremia on 6/17. His course has been c/b HALEY. Baseline creat 1.0 as recent as 6/19/23).        HALEY stage II  Recurrent HALEY in setting of significant hemodynamic changes including diuretics and Vancomycin use   Since his admission, have had multiple HALEY episodes, recently a week ago when he was given diuretics for volume management among others including continue use of vancomycin (level was slightly high on 7/13). Now his creatinine continue to trend up. Also noted pt continued to be altered intermittently. Question about uremic syndrome raised which can not be ruled out at this point with continued renal dysfunction for quite some time.    His renal function is stabilizing from 5.81  to 5.7 today and his eGFR is about 10 ml with stable electrolytes.    Please refer to note of Megan Kessler on 7/21/23 for care conference and decision about HD. See part of the note below:     \" - We discussed that currently Rahat would not meet OP HD criteria given that he is a transfer of 3 and would likely require a bed. The only hope at this time " "of any improvement in his cognitive function, likely a very small chance, is that with dialysis any contribution of uremia may improve and allow him to improve his cognition and either be clear about wanting dialysis and or become more engaged in therapies so that he could be a candidate for OP HD.     - The decision by brother Ivan was to go ahead with a 2 wk trial of HD and if at the end of the 2 wks no change in his cognitive status, discontinue HD. If Don improves on HD then he would ultimately remain inpatient until he was strong enough to meet OP HD criteria.     - Will plan on initiating HD on Monday. IR orders have been placed. Primary team discontinuing Apixiban and switching to Heparin   - Continue to monitor for renal recovery with daily chemistry labs/strict I/O and daily weights\"    - continue holding diuresis   - 1 L of LR (ordered)  - increase po water intake as able   - will monitor closely for further recovery     Volume status - No edema/dyspnea/hypoxia. Bp 160'/. Had 500 ml plus unmeasured occurrences yesterday. (Using a urinal but also incontinent).    - Furosemide 40 mg every day was discontinued 7/20/23 w/o improvement    - Please weigh daily. Zero bed    - Continue I/O     3. HTN - Borderline control in HALEY. Bp 150-160's/. No edema  Current regimen: Amlodipine 5 mg every day    - Avoid hypotension. B/p ok at this time   - continue holding diuretic     4. MRSA bacteremia- Will be completing 6 wks of Vancomycin and ceftazidime 8/1/23. Then start po cipro and doxycycline on 8/2 for 6 weeks..    - UC with VRE but only 10-50 k. No treatment required   - Per ID     5. Electrolytes - No acute issues today. Na 144, K 3.8   - Given recent hypernatremia continue to encourage fluid intake     6 Acid base - bicarb a little lower today at 21,   - will monitor closely     7. BMD - Ca 8.5 Phos 5.3, up from 5.1, albumin 3.1   - Malnutrition present: Getting MVI. Would benefit from dietary supplement   - No " "need for Phos binder at this time   - Vit D 16   - Began D3 - 50 mcq every day 7/18   - Switched to normal diet 7/26     8. Anemia - Hgb 7.8   - Etiology acute illness   - On Iron, folic acid   - Iron studies 6/26/23: Ferritin 2616, Fe 30 IS 21   - Does have DVT history on Eliquis - switched to heparin   - Ferritin too high for IV iron   - Transfusions per primary team      Recommendations were communicated to primary team verbally.     Seen and discussed with Dr. Bryan.     OPAL MAYER, PA-C   Division of Renal Disease and Hypertension  [facility]  [employer]  Vocera Web Console    Interval History :   Nursing and provider notes from last 24 hours reviewed.  In the last 24 hours Waldo Rodriguezy been hemodynamically stable. He did not eat breakfast this morning. Continued to encourage pt to eat and drink water. His UOP was 500 ml yesterday with some unmeasured episodes. He has no n/v/d. No shortness of breath.    Review of Systems:   As noted above in interval history    Physical Exam:   I/O last 3 completed shifts:  In: 130 [P.O.:120; I.V.:10]  Out: 500 [Urine:500]   /79 (BP Location: Left arm, Patient Position: Supine, Cuff Size: Adult Large)   Pulse 91   Temp 97.6  F (36.4  C) (Oral)   Resp 20   Ht 1.778 m (5' 10\")   Wt 134.4 kg (296 lb 6.4 oz)   SpO2 90%   BMI 42.53 kg/m       General : Pt awake, not in acute distress   Lungs : no respiratory distress  Cardiac : RRR  LE :trace Edema noted  Dialysis Access : N/A    Labs:   All labs reviewed by me  Electrolytes/Renal -   Recent Labs   Lab Test 07/27/23  0854 07/27/23  0803 07/27/23  0157 07/26/23  1141 07/26/23  0805 07/25/23  0800 07/25/23  0735 07/12/23  1209 07/12/23  0911 07/05/23  0823 07/05/23  0700 07/02/23  1754 07/02/23  1149   NA  --  144  --   --  144  --  145   < > 143   < > 142   < > 137   POTASSIUM  --  3.8  --   --  3.8  --  3.8   < > 4.1   < > 4.5   < > 4.3   CHLORIDE  --  108*  --   --  107  --  108*   < > 112*   < > 108*   < > 107 "   CO2  --  21*  --   --  20*  --  22   < > 19*   < > 16*   < > 21*   BUN  --  56.3*  --   --  56.8*  --  57.7*   < > 43.6*   < > 46.3*   < > 39.1*   CR  --  5.75*  --   --  5.81*  --  6.05*   < > 4.44*   < > 3.30*   < > 2.20*   * 117* 96   < > 111*   < > 102*   < > 105*   < > 99   < > 128*   MAIKOL  --  8.5*  --   --  8.4*  --  8.6*   < > 8.3*   < > 7.7*   < > 7.9*   MAG  --   --   --   --   --   --   --   --  1.6*  --  1.7  --  1.7   PHOS  --  5.3*  --   --  5.1*  --  6.2*   < > 5.0*   < >  --   --   --     < > = values in this interval not displayed.       CBC -   Recent Labs   Lab Test 07/26/23  0805 07/20/23  0538 07/11/23  0656   WBC 9.5 8.4 8.5   HGB 7.8* 8.0* 8.1*    251 262       LFTs -   Recent Labs   Lab Test 07/27/23  0803 07/26/23  0805 07/25/23  0735 07/06/23  1708 07/06/23  0713 07/04/23  0627 06/22/23  0711 06/20/23  0704   ALKPHOS  --   --   --   --  103 99  --  93   BILITOTAL  --   --   --   --  <0.2 0.2  --  0.2   ALT  --   --   --   --  12 14  --  14   AST  --   --   --   --  9 11  --  14   PROTTOTAL  --   --   --   --  5.3* 5.9*  --  5.9*   ALBUMIN 3.1* 3.0* 3.1*   < > 2.4*  2.4* 2.6*   < > 2.3*    < > = values in this interval not displayed.       Iron Panel -   Recent Labs   Lab Test 06/26/23  0513   IRON 30*   IRONSAT 21   FADI 2,616*       Current Medications:   acetaminophen  975 mg Oral Q8H    amLODIPine  5 mg Oral Daily    [Held by provider] apixaban ANTICOAGULANT  2.5 mg Oral BID    atorvastatin  40 mg Oral Daily    bisacodyl  10 mg Rectal Once    cefTAZidime  2 g Intravenous Q24H    ferrous sulfate  325 mg Oral Every Other Day    folic acid  1 mg Oral Daily    heparin ANTICOAGULANT  5,000 Units Subcutaneous Q12H    heparin lock flush  5-20 mL Intracatheter Q24H    insulin aspart  1-7 Units Subcutaneous TID AC    insulin aspart  1-5 Units Subcutaneous At Bedtime    lactobacillus rhamnosus (GG)  1 capsule Oral BID    miconazole   Topical BID    multivitamin w/minerals  1 tablet  Oral Daily    senna-docusate  1 tablet Oral BID    sodium chloride (PF)  10 mL Intracatheter Q8H    sodium chloride (PF)  10-40 mL Intracatheter Q8H    vancomycin place montilla - receiving intermittent dosing  1 each Intravenous See Admin Instructions    cholecalciferol  50 mcg Oral Daily       OPAL MAYER, DIANA

## 2023-07-27 NOTE — PLAN OF CARE
Assumed cares from [id number].    Stayed in bed most of the time. Encouraged repositioning but refused. PRN Oxycodone given once after incontinent care. Had bowel movement this shift and voided 200 ml clear yellow urine. Takes pills whole, no problem with swallowing. Denied SOB, chestpain, dizziness and light-headedness. Pressure sore boots in place to both heels. Barrier cream applied to bottom after incontinent care. R-port patent with dressing c/d/I.

## 2023-07-27 NOTE — PLAN OF CARE
"VS: BP (!) 176/79 (BP Location: Left arm)   Pulse 88   Temp 98.2  F (36.8  C) (Oral)   Resp 20   Ht 1.778 m (5' 10\")   Wt 134.4 kg (296 lb 6.4 oz)   SpO2 93%   BMI 42.53 kg/m      O2: O2 SATS >90% denies chest pain,  or SBO   Output: Voids adequately using urinal   Last BM: 7/27 23 BS present all x4 quadrants    Activity: Not OOB this shift Assist of  2-3  with cares    Up for meals? N/A   Skin: L hip surgical incision,scalling/ scab to bilateral lower foot, readiness to coccyx barrier  wound to left heel ,   Pain: Pain managed with prn oxycodone    CMS: intermittent confusion and difficulty word finding,  disoriented to time and and place    Dressing: CDI    Diet: Regular diet diabetic with BG checks and sliding scale    LDA: Right CVC, Heparin locked.    Equipment: IV Pole,  celling  lift personal belongings   Plan: Continue with plan of care    Additional Info:                            "

## 2023-07-27 NOTE — PROVIDER NOTIFICATION
5Alcira Aparicio is combative this afternoon, refuses to let me start the IV bolus, any PO meds, to turn him or change his brief  Thanks  Tresa  [id number]

## 2023-07-27 NOTE — PROGRESS NOTES
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  Postop course complicated by profound deconditioning.  He was admitted to TCU initially on 12/23/2022 for physical rehabilitation, however there was no progress with therapies after several months of in bed therapy and his refusals to mobilize. Underwent biopsy on 4/7/2023 without evidence of infection.  He was transferred back to [facility] for left total hip arthroplasty and reimplantation on 5/26/23 with Dr. Meyers.  Presented again to [facility] for persistent MRSA bacteremia on 6/17. Pt having ongoing encephalopathy and clearly lacks capacity, current though is 2/2 renal failure and uremia. Nephro following, holding off on HD initiation as pt may be pending renal recovery.     Brief Hospital Summary:    Pt was admitted to continue infectious workup with MRSA bacteremia.  Admission blood cultures were positive, ID and Ortho were consulted. KEILA was obtained on 6/19, which was negative for endocarditis, ortho took for washout/debridement of hip on 6/21. ID recommending 6 weeks IV abx (6/21-8/1) via tunneled internal jugular line (nephrology asked to defer PICC) followed by 6 weeks of PO cipro/doxy. He has developed persistent encephalopathy that is felt to be multifactorial. Neurology consulted and there was concern for contribution of cefepime neurotoxicity. EEG was negative for seizures and CT head negative. He did have some mild improvement after stopping cefepime, but has not returned to baseline.  Complicating the situation further is that he has developed worsening acute renal failure with now concern for uremia contributing to his mental status. Care conference on 7/21 with brother Ivan as surrogate decision maker with plan for short trial of dialysis to determine if pt has any meaningful improvement.    Today:  - Follow up nephro recs: still holding on HD initiation  - Defer further IVF to nephro  - Cont IV vanc and ceftazidime through 8/1 then switch to PO doxy + cipro through 9/13  - Cont subQ heparin in place of DOAC in case needs AC held for line + HD initiation in coming days   - Kidney function stable to perhaps slightly improved    Goals of Care  Complex medical course over the past 6+ months. Was in [facility] since 12/2022 due to profound deconditioning, but ultimately refused to participate meaningfully in therapies. Was readmitted in 6/23 with MRSA bacteremia after repeat hip surgery in 4/23. His hospital course has been complicated by acute renal failure, multifactorial encephalopathy and apathy/refusal to participate in therapies or medical cares. He was assessed by psychiatry on 7/18 and felt to be non-decisional regarding complex medical decisions or disposition. His brother (Ivan) is his legal POA, but Don does not have a healthcare directive. Care conference held on 7/21 with medicine, palliative, renal, SW, RN manager - discussed the above and options including a short trial (~2 weeks) of dialysis to see if it improves his mental function and willingness/ability to participate in therapies vs moving towards a comfort-based approach. Ultimately, Ivan decided to move forward with the trial of dialysis and a plan to reassess in ~2 weeks to determine next steps. We discussed that if he does not meaningfully improve, outpatient dialysis would likely not be an option due to his profound immobility.  - Initially planned to move forward with short trial of dialysis.  - Will monitor for  improvement with a plan to reassess after dialysis trial to make next decisions regarding goals of care.  - Continue full code for now. Ivan to discuss more with his brothers.    Multifactorial encephalopathy - likely toxic metabolic, uremic, delirium, depression contributing  Noted to have acutely worsening mental status around ~7/4, which was initially felt to be related to cefepime neurotoxicity. Neurology consulted. EEG without seizures. CT head, ammonia and VBG wnl. Completed 5 days of antibiotics. No clinical or objective findings concerning for worsening infection. Does have worsening renal failure with rising BUN, so some consideration that uremia could be contributing, but overall not hugely elevated. Also concern that depression is contributing.  - Delirium precautions  - Nephrology following  - After care conference on 7/21, planned for a short trial of inpatient dialysis however nephro not in agreement with starting HD and waiting for renal recovery  - Psychiatry consulted on 7/18  for capacity assessment - pt not decisional regarding complex medical decisions or disposition.  -If no plans for HD per nephro and continued encephalopathy, will consult neuro and consider MRI.     MRSA bacteremia due to possible left hip septic arthritis vs other source Blood cultures: 6/14, 6/15 + for MRSA (4/4), and 6/16 (1/2 GPCs) and 6/17 (2/2) GPCs. - started vancomycin 6/15.   6/17 sinovial fluid cultures with staph and klebsiella. Purulent drainage noted at incision site at TCU, CT left hip concerning for infection (no mention of joint, mostly anterior in soft tissue) and couldn't exclude abscess/inf  - ID intermittently following through hospital course. Last seen 7/21.  - Continue ceftazidime, vancomycin for C.acnes, Pseudomonas, MRSA PJI until 8/1/2023  - Start oral doxycycline and ciprofloxacin on 8/2/2023 through 9/13 (total 12 weeks therapy)  - ID follow up ~8/21 (can be inpatient team if remains inpatient or  can be outpatient)     Acute Kidney Injury / Acute renal failure - multifactorial, see above/below  Nephrology consulted, appreciate recs. Baseline creatinine 1.0 or 1.1. Etiology: suspect combo of sepsis/inflammation, hypovolemia, blood loss anemia following surgery, vancomycin. UA recollected on 7/19 with granular casts suggesting ongoing dense ATN. Creatinine continuing to uptrend, however no acidosis, electrolyte abnormalities or evidence of hypervolemia. Some concern that uremia could be contributing to his encephalopathy. After GOC on 7/21, planned to pursue short trial of dialysis.  - Nephrology consulted  - Initially planned for short trial of HD however nephro holding off on line placement and HD as they think pt has impending renal recovery.   - See above for GOC converation.  Lab Results   Component Value Date    CR 5.75 (H) 07/27/2023    CR 5.81 (H) 07/26/2023    CR 6.05 (H) 07/25/2023    CR 5.88 (H) 07/24/2023    CR 5.80 (H) 07/23/2023       Asymptomatic bacteruria (VRE)  UA obtained on 7/19 to assess for casts, reflexed to culture which is growing VRE. Labs and vitals with low concern for new infection. Discussed with ID who recommended against treatment.    Bilateral LE edema due to volume overload from HALEY and iatrogenic from blood transfusions  Hyperesthesia of bilateral lower extremities (no signs of cellulitis as of 7/13)  - Holding diuresis starting 7/20    L heel pressure wound, unstagable  Noted earlier in pts hospitalization (~6/20), but at that time he refused assessment. Noted again by nursing to be worsening on 7/19 and WOCN consult was placed. Pt was agreeable and had wound assessed. He has been mostly non-agreeable to offloading or other preventative measures recommended by RN and WOCN, but will sometimes allow it with significant encouragement.  - WOCN consult  - Continue to encourage pt to utilize offloading techniques and to participate in wound cares.    Hypernatremia -  resolved  Uptrending recently, 147 on 7/18. Likely volume down. S/p D5W on 7/18, resolved.  - Monitor. Holding diuretics.    Hypoxemic Respiratory Failure due to immobility, surgery - resolved  - continue to follow; encourage IS    Acute blood loss anemia following surgery, complicated by use of blood thinner  - 3 units day after surgery did joint washout (6/22) for acute blood loss anemia, 1 unit 6/25  - watch for bleeding; hold apixaban if need be - had unprovoked dvt in 2018 and would be on eliquis for life, high risk of recurrent DVT given recent procedures and illness/immobility  - iron and folic acid daily    S/p left hip explantation of left hip antibiotic spacer and revision of left total hip arthroplasty (5/26/2023)  - ortho performed washout 6/21  -Activity: 50% PWB LLE with posterior hip precautions [id number]  -AFO ordered for foot drop but patient refusing  -PT/OT  -Knee to be kept even in a foam leg elevator to relax sciatic nerve  -Pain: holding robaxin 4 times a day with mental status changes (discontinuing), APAP PRN  -stop tramadol (7/11) due to renal function  -increase oxycodone 2.5-5 mg q6h PRN- taking very minimal amts so unlikely to be contributing to mental status    Hx of unprovoked DVT in 2018   - Hold PTA apixiban pending line placement / possible HD initiation     Type 2 diabetes mellitus  - Last hemoglobin A1c 5.6% on 6/16/2023.  On glipizide, metformin, Actos, and Victoza prior to hospitalizations and surgery.   - continue ISS    Constipation, improved  -Continue Colace 100 mg twice daily  -Continue senna 1 tablet twice daily-- had briefly increased to 2 tab twice daily due to lack of stool, now improved  -Continue MiraLAX daily     Hypomagnesemia  -monitor and replete      Severe malnutrition in the context of acute on chronic illness  Goal for patient is to consume % of meals TID. Goal is 0698-5837 kcals/daily. Protein needs- 100-126 grams/daily.   -RD consult and appreciate  "recs  -regular diet  -Weekly weights, daily I/O     Adjustment disorder with depressed mood  Prolonged grief disorder  Patient has been with depressed mood in setting of ongoing health issues, as evidenced by lack of motivation to work with therapies during both TCU admissions.  Past history of passive suicidal statements. Psychiatry saw patient during hospital admission on 5/31 due to lack of motivation with therapies. Patient declined interest in starting any psychiatric medications. Was willing to engage with health psychology.   -Outpatient health psychology consult  -Continue to monitor for willingness to start antidepressant therapy  -tried to discuss mood 7/15 but he didn't fully answer questions, seemed to talk about different aspect of his health that wasn't quite related to mood     HTN:   - Started amlodipine 7/16    Stable and Resolved Issues:  Hyperlipidemia-continue PTA Lipitor 40 mg daily  JAIR-CPAP when sleeping       Diet: Regular Diet Adult    DVT Prophylaxis: Heparin SQ  Tran Catheter: Not present  Lines: PRESENT      CVC Single Lumen Right Internal jugular Non - valved (open ended);Tunneled;Power injectable-Site Assessment: WDL      Cardiac Monitoring: None  Code Status: Full Code      Clinically Significant Risk Factors              # Hypoalbuminemia: Lowest albumin = 2.2 g/dL at 7/8/2023  8:51 AM, will monitor as appropriate    # Acute Kidney Injury, unspecified: based on a >150% or 0.3 mg/dL increase in last creatinine compared to past 90 day average, will monitor renal function         # Severe Obesity: Estimated body mass index is 42.53 kg/m  as calculated from the following:    Height as of this encounter: 1.778 m (5' 10\").    Weight as of this encounter: 134.4 kg (296 lb 6.4 oz).   # Severe Malnutrition: based on nutrition assessment         Disposition Plan           eJsse Ring DO  Hospitalist Service, GOLD TEAM 21  [facility]  Securely " "message with Kelley (more info)  Text page via [employer] Paging/Directory   See signed in provider for up to date coverage information  ______________________________________________________________________    Interval History   Pt today still confused. Does not remember me or know where he is. He kept intermittently saying \"well you have big plans\" but then could not elaborate. Was eating breakfast on my interview and he said this was going okay. He remembers Ivan when I asked and says he is a good sharri.     Physical Exam   Vital Signs: Temp: 98.2  F (36.8  C) Temp src: Oral BP: 136/79 Pulse: 88   Resp: 20 SpO2: 93 % O2 Device: None (Room air)    Weight: 296 lbs 6.4 oz    General Appearance:  NAD. Lying comfortably in bed.  Respiratory: CTAB. No increased WOB.  Cardiovascular: RRR.  GI: Soft. Non-tender. Non-distended.  Skin: No rash.  Other:  Moving all extremities. Normal affect. Intermittent myoclonus.    Medical Decision Making         Data     I have personally reviewed the following data over the past 24 hrs:    N/A  \   N/A   / N/A     144 108 (H) 56.3 (H) /  106 (H)   3.8 21 (L) 5.75 (H) \     ALT: N/A AST: N/A AP: N/A TBILI: N/A   ALB: 3.1 (L) TOT PROTEIN: N/A LIPASE: N/A       Lab Results   Component Value Date    VANCOMYCIN 21.3 07/27/2023     Lab Results   Component Value Date     (H) 07/27/2023     (H) 07/27/2023    GLC 96 07/27/2023       "

## 2023-07-28 NOTE — PLAN OF CARE
"  VS: BP (!) 167/74 (BP Location: Left arm)   Pulse 80   Temp 97.5  F (36.4  C) (Oral)   Resp 18   Ht 1.778 m (5' 10\")   Wt 134.4 kg (296 lb 6.4 oz)   SpO2 93%   BMI 42.53 kg/m      O2: Stable on room air>90%. Denies SOB or chest pain   Output: Used urinal at bedside and Incontinent of bladder  this shift   Last BM: 7/27/23   Activity: Assist of 2 with cares and repositioning in bed   Skin: Redness/abrasion to buttocks- barrier cream applied and 2 mepilex in place  L hip incision and left heel pressure ulcer    Pain: Managed with PRN oxycodone    CMS: AXO to self.confused   Dressing: L hip- open to air  Buttocks- 2 meplilex   Diet: Reg diet    10 PM IC=026, 2AM=99   LDA: Right CVC Single lumen-chest- SL   Equipment: Pt belongings, rookie boots, iv pole and pump,    Plan: Count  with POC.  Encourage repositioning,    Additional Info: Pt refused left heel assessed. Rookie boots in place. Pt agreed to be repositioned twice. Pt was incontinent of bladder. Changed bedding's. IT/Buttock abrasion/peeling/redness- barrier cream  and powder applied mepilex dressing applied.pt is now resting in bed comfortable.                             "

## 2023-07-28 NOTE — PLAN OF CARE
VS: Temp: 97.8  F (36.6  C) Temp src: Oral BP: 123/51 Pulse: 82   Resp: 16 SpO2: 90 % O2 Device: None (Room air)       O2: Pt.'s O2 sats are above 90 on room air, denies shortness of breath, denies chest pain   Output: Pt. Is able to use urinal with occasional episodes of incontinence   Last BM: 7/28/2023   Activity: Assist x2 with cares and turning in bed, repositioning   Skin: Redness/peeling on buttocks/sacrum, L hip incision, L heel pressure injury, L groin/inner thigh abscess (open to air, draining). Multiple small scratches and bruising to bilateral upper and lower extremities.   Pt. Had small BM at 10:30pm, linen and pad changed, pericare provided, barrier cream applied, powder applied under folds.   Pain: Denies, non-verbal indicators present when touching bilateral lower extremities   CMS: A&O to self. Disoriented to time and situation. Pt. Knows he is in a hospital but was unable to recall which one. Denies numbness and tingling   Dressing: R chest PICC (changed today) CDI, L heel wound dressing UTV, pt. Refused to allow me to move his leg to assess the dressing. Dressing changed today by [facility] nurse.   Diet: Regular, poor appetite. Pt. Denies nausea and vomiting   LDA: R chest PICC, L inner thigh abscess, L heel pressure injury, L hip incision, redness and peeling to buttocks/sacrum   Equipment: IV pole, bariatric commode (in bathroom), urinal, personal belongings   Plan: TBD    Additional Info:

## 2023-07-28 NOTE — PLAN OF CARE
Goal Outcome Evaluation:     Shift: 0700 - 1530    Vital Signs: Temp: 98.6  F (37  C) Temp src: Oral BP: (!) 165/78 Pulse: 79   Resp: 18 SpO2: 97 % O2 Device: None (Room air)        CMS/Neuro: A&O x 1 - Oriented to self      Cardio/Resp: No wheezing or crackles - Lung sounds clear bilaterally      Last BM: LBM: 7/27/23 - BS active & audible x4.      Output: Voids spontaneously in urinal, Incontinent at times.     Activity: Assist x 2 w/ cares and repositioning.     Skin: Redness to buttocks - Powder applied  L hip incision and L heel pressure ulcer  Abscess to inner thigh - Open to air to drain - Per WOC nurse     Pain: Unable to voice - No signs of pain     Dressing: CDI - Changed today by WOC nurse   Next change is Monday     LDA: R CVC Single Lumen - Chest     Diet: Regular, Thin liquids, Meds whole  BG @ 0838 was 92     Additional Info: Call light w/in reach.  Charline michel     Plan: TBD

## 2023-07-28 NOTE — PROGRESS NOTES
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  Postop course complicated by profound deconditioning.  He was admitted to TCU initially on 12/23/2022 for physical rehabilitation, however there was no progress with therapies after several months of in bed therapy and his refusals to mobilize. Underwent biopsy on 4/7/2023 without evidence of infection.  He was transferred back to [facility] for left total hip arthroplasty and reimplantation on 5/26/23 with Dr. Meyers.  Presented again to [facility] for persistent MRSA bacteremia on 6/17. Pt having ongoing encephalopathy and clearly lacks capacity, current though is 2/2 renal failure and uremia. Nephro following, holding off on HD initiation as pt may be pending renal recovery.     Brief Hospital Summary:    Pt was admitted to continue infectious workup with MRSA bacteremia.  Admission blood cultures were positive, ID and Ortho were consulted. KEILA was obtained on 6/19, which was negative for endocarditis, ortho took for washout/debridement of hip on 6/21. ID recommending 6 weeks IV abx (6/21-8/1) via tunneled internal jugular line (nephrology asked to defer PICC) followed by 6 weeks of PO cipro/doxy. He has developed persistent encephalopathy that is felt to be multifactorial. Neurology consulted and there was concern for contribution of cefepime neurotoxicity. EEG was negative for seizures and CT head negative. He did have some mild improvement after stopping cefepime, but has not returned to baseline.  Complicating the situation further is that he has developed worsening acute renal failure with now concern for uremia contributing to his mental status. Care conference on 7/21 with brother Ivan as surrogate decision maker, planned for short trial of dialysis to determine if pt has any meaningful improvement.    Today:  - Follow up nephro recs: still holding on HD initiation  - Defer further IVF to nephro  - Cont IV vanc and ceftazidime through 8/1 then switch to PO doxy + cipro through 9/13  - Cont subQ heparin in place of DOAC in case needs AC held for line + HD initiation in coming days   - Kidney function improved    Goals of Care  Complex medical course over the past 6+ months. Was in [facility] since 12/2022 due to profound deconditioning, but ultimately refused to participate meaningfully in therapies. Was readmitted in 6/23 with MRSA bacteremia after repeat hip surgery in 4/23. His hospital course has been complicated by acute renal failure, multifactorial encephalopathy and apathy/refusal to participate in therapies or medical cares. He was assessed by psychiatry on 7/18 and felt to be non-decisional regarding complex medical decisions or disposition. His brother (Ivan) is his legal POA, but Don does not have a healthcare directive. Care conference held on 7/21 with medicine, palliative, renal, SW, RN manager - discussed the above and options including a short trial (~2 weeks) of dialysis to see if it improves his mental function and willingness/ability to participate in therapies vs moving towards a comfort-based approach. Ultimately, Ivan decided to move forward with the trial of dialysis and a plan to reassess in ~2 weeks to determine next steps. We discussed that if he does not meaningfully improve, outpatient dialysis would likely not be an option due to his profound immobility.  - Initially planned to move forward with short trial of dialysis.  - Will monitor for improvement with a plan to  reassess after dialysis trial to make next decisions regarding goals of care.  - Continue full code for now. Ivan to discuss more with his brothers.    Multifactorial encephalopathy - likely toxic metabolic, uremic, delirium, depression contributing  Noted to have acutely worsening mental status around ~7/4, which was initially felt to be related to cefepime neurotoxicity. Neurology consulted. EEG without seizures. CT head, ammonia and VBG wnl. Completed 5 days of antibiotics. No clinical or objective findings concerning for worsening infection. Does have worsening renal failure with rising BUN, so some consideration that uremia could be contributing, but overall not hugely elevated. Also concern that depression is contributing.  - Delirium precautions  - Nephrology following  - After care conference on 7/21, planned for a short trial of inpatient dialysis however nephro not in agreement with starting HD and waiting for renal recovery  - Psychiatry consulted on 7/18  for capacity assessment - pt not decisional regarding complex medical decisions or disposition.  -If no plans for HD per nephro and continued encephalopathy, will consult neuro and consider MRI.     MRSA bacteremia due to possible left hip septic arthritis vs other source Blood cultures: 6/14, 6/15 + for MRSA (4/4), and 6/16 (1/2 GPCs) and 6/17 (2/2) GPCs. - started vancomycin 6/15.   6/17 sinovial fluid cultures with staph and klebsiella. Purulent drainage noted at incision site at TCU, CT left hip concerning for infection (no mention of joint, mostly anterior in soft tissue) and couldn't exclude abscess/inf  - ID intermittently following through hospital course. Last seen 7/21.  - Continue ceftazidime, vancomycin for C.acnes, Pseudomonas, MRSA PJI until 8/1/2023  - Start oral doxycycline and ciprofloxacin on 8/2/2023 through 9/13 (total 12 weeks therapy)  - ID follow up ~8/21 (can be inpatient team if remains inpatient or can be outpatient)     Acute  Kidney Injury / Acute renal failure - multifactorial, see above/below  Nephrology consulted, appreciate recs. Baseline creatinine 1.0 or 1.1. Etiology: suspect combo of sepsis/inflammation, hypovolemia, blood loss anemia following surgery, vancomycin. UA recollected on 7/19 with granular casts suggesting ongoing dense ATN. Creatinine continuing to uptrend, however no acidosis, electrolyte abnormalities or evidence of hypervolemia. Some concern that uremia could be contributing to his encephalopathy. After GOC on 7/21, planned to pursue short trial of dialysis.  - Nephrology consulted  - Initially planned for short trial of HD however nephro holding off on line placement and HD as they think pt has impending renal recovery.   - See above for GOC converation.  Lab Results   Component Value Date    CR 5.75 (H) 07/27/2023    CR 5.81 (H) 07/26/2023    CR 6.05 (H) 07/25/2023    CR 5.88 (H) 07/24/2023    CR 5.80 (H) 07/23/2023       Asymptomatic bacteruria (VRE)  UA obtained on 7/19 to assess for casts, reflexed to culture which is growing VRE. Labs and vitals with low concern for new infection. Discussed with ID who recommended against treatment.    Bilateral LE edema due to volume overload from HALEY and iatrogenic from blood transfusions  Hyperesthesia of bilateral lower extremities (no signs of cellulitis as of 7/13)  - Holding diuresis starting 7/20    L heel pressure wound, unstagable  Noted earlier in pts hospitalization (~6/20), but at that time he refused assessment. Noted again by nursing to be worsening on 7/19 and WOCN consult was placed. Pt was agreeable and had wound assessed. He has been mostly non-agreeable to offloading or other preventative measures recommended by RN and WOCN, but will sometimes allow it with significant encouragement.  - WOCN consult  - Continue to encourage pt to utilize offloading techniques and to participate in wound cares.    Hypernatremia - resolved  Uptrending recently, 147 on 7/18.  Likely volume down. S/p D5W on 7/18, resolved.  - Monitor. Holding diuretics.    Hypoxemic Respiratory Failure due to immobility, surgery - resolved  - continue to follow; encourage IS    Acute blood loss anemia following surgery, complicated by use of blood thinner  - 3 units day after surgery did joint washout (6/22) for acute blood loss anemia, 1 unit 6/25  - watch for bleeding; hold apixaban if need be - had unprovoked dvt in 2018 and would be on eliquis for life, high risk of recurrent DVT given recent procedures and illness/immobility  - iron and folic acid daily    S/p left hip explantation of left hip antibiotic spacer and revision of left total hip arthroplasty (5/26/2023)  - ortho performed washout 6/21  -Activity: 50% PWB LLE with posterior hip precautions [id number]  -AFO ordered for foot drop but patient refusing  -PT/OT  -Knee to be kept even in a foam leg elevator to relax sciatic nerve  -Pain: holding robaxin 4 times a day with mental status changes (discontinuing), APAP PRN  -stop tramadol (7/11) due to renal function  -increase oxycodone 2.5-5 mg q6h PRN- taking very minimal amts so unlikely to be contributing to mental status    Hx of unprovoked DVT in 2018   - Hold PTA apixiban pending line placement / possible HD initiation     Type 2 diabetes mellitus  - Last hemoglobin A1c 5.6% on 6/16/2023.  On glipizide, metformin, Actos, and Victoza prior to hospitalizations and surgery.   - continue ISS    Constipation, improved  -Continue Colace 100 mg twice daily  -Continue senna 1 tablet twice daily-- had briefly increased to 2 tab twice daily due to lack of stool, now improved  -Continue MiraLAX daily     Hypomagnesemia  -monitor and replete      Severe malnutrition in the context of acute on chronic illness  Goal for patient is to consume % of meals TID. Goal is 3963-4342 kcals/daily. Protein needs- 100-126 grams/daily.   -RD consult and appreciate recs  -regular diet  -Weekly weights, daily I/O    "  Adjustment disorder with depressed mood  Prolonged grief disorder  Patient has been with depressed mood in setting of ongoing health issues, as evidenced by lack of motivation to work with therapies during both TCU admissions.  Past history of passive suicidal statements. Psychiatry saw patient during hospital admission on 5/31 due to lack of motivation with therapies. Patient declined interest in starting any psychiatric medications. Was willing to engage with health psychology.   -Outpatient health psychology consult  -Continue to monitor for willingness to start antidepressant therapy  -tried to discuss mood 7/15 but he didn't fully answer questions, seemed to talk about different aspect of his health that wasn't quite related to mood     HTN:   - Started amlodipine 7/16    Stable and Resolved Issues:  Hyperlipidemia-continue PTA Lipitor 40 mg daily  JAIR-CPAP when sleeping       Diet: Regular Diet Adult    DVT Prophylaxis: Heparin SQ  Tran Catheter: Not present  Lines: PRESENT      CVC Single Lumen Right Internal jugular Non - valved (open ended);Tunneled;Power injectable-Site Assessment: WDL      Cardiac Monitoring: None  Code Status: Full Code      Clinically Significant Risk Factors              # Hypoalbuminemia: Lowest albumin = 2.2 g/dL at 7/8/2023  8:51 AM, will monitor as appropriate    # Acute Kidney Injury, unspecified: based on a >150% or 0.3 mg/dL increase in last creatinine compared to past 90 day average, will monitor renal function         # Severe Obesity: Estimated body mass index is 42.53 kg/m  as calculated from the following:    Height as of this encounter: 1.778 m (5' 10\").    Weight as of this encounter: 134.4 kg (296 lb 6.4 oz).   # Severe Malnutrition: based on nutrition assessment         Disposition Plan           Jesse Ring DO  Hospitalist Service, GOLD TEAM 21  [facility]  Securely message with [employer] (more info)  Text page via " [employer] Paging/Directory   See signed in provider for up to date coverage information  ______________________________________________________________________    Interval History   Pt today still confused. Does not remember me or know where he is. He said he saw Ivan while at [employer] last night. No new sx subjectively other than  his L leg hurts which is stable from how he remembers historically. Pt is unsure why he needs to be here. I told him about his current medical issues and kidney damage, he was surprised to hear this. I told him to remember this conversation for tomorrow.     Physical Exam   Vital Signs: Temp: 98.6  F (37  C) Temp src: Oral BP: (!) 165/78 Pulse: 79   Resp: 18 SpO2: 97 % O2 Device: None (Room air)    Weight: 296 lbs 6.4 oz    General Appearance:  NAD. Lying comfortably in bed.  Respiratory: CTAB. No increased WOB.  Cardiovascular: RRR.  GI: Soft. Non-tender. Non-distended.  Skin: No rash.  Other:  Moving all extremities. Normal affect. Intermittent myoclonus.    Medical Decision Making         Data     I have personally reviewed the following data over the past 24 hrs:    9.9  \   8.3 (L)   / 311     146 (H) 109 (H) 55.1 (H) /  111 (H)   3.8 22 5.35 (H) \     ALT: N/A AST: N/A AP: N/A TBILI: N/A   ALB: 3.2 (L) TOT PROTEIN: N/A LIPASE: N/A     Procal: N/A CRP: N/A Lactic Acid: N/A         Lab Results   Component Value Date    VANCOMYCIN 21.3 07/27/2023     Lab Results   Component Value Date     (H) 07/28/2023    GLC 92 07/28/2023    GLC 99 07/28/2023

## 2023-07-28 NOTE — PROGRESS NOTES
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  Postop course complicated by profound deconditioning.  He was admitted to TCU initially on 12/23/2022 for physical rehabilitation, however therapies no longer worked with patient after several months of an in bed therapy and his refusals to mobilize.  Underwent biopsy on 4/7/2023 without evidence of infection.  He was transferred back to [facility] for left total hip arthroplasty and reimplantation on 5/26/23 with Dr. Meyers.  Presented again to [place] for persistent MRSA bacteremia on 6/17. His course has been c/b HALEY. Baseline creat 1.0 as recent as 6/19/23).        HALEY stage II  Recurrent HALEY in setting of significant hemodynamic changes including diuretics and Vancomycin use   Since his admission, have had multiple HALEY episodes, recently a week ago when he was given diuretics for volume management among others including continue use of vancomycin (level was slightly high on 7/13). Now his creatinine continue to trend up. Also noted pt continued to be altered intermittently. Question about uremic syndrome raised which can not be ruled out at this point with continued renal dysfunction for quite some time.    His renal function is stabilizing from 5.7  to 5.35 today and his eGFR is about 11 ml with mild hypernatremia.    Please refer to note of Megan Kessler on 7/21/23 for care conference and decision about HD. See part of the note below:     \" - We discussed that currently Rahat would not meet OP HD criteria given that he is a transfer of 3 and would likely require a bed. The only hope at this time " "of any improvement in his cognitive function, likely a very small chance, is that with dialysis any contribution of uremia may improve and allow him to improve his cognition and either be clear about wanting dialysis and or become more engaged in therapies so that he could be a candidate for OP HD.     - The decision by brother Ivan was to go ahead with a 2 wk trial of HD and if at the end of the 2 wks no change in his cognitive status, discontinue HD. If Don improves on HD then he would ultimately remain inpatient until he was strong enough to meet OP HD criteria.     - Will plan on initiating HD on Monday. IR orders have been placed. Primary team discontinuing Apixiban and switching to Heparin   - Continue to monitor for renal recovery with daily chemistry labs/strict I/O and daily weights\"    - continue holding diuresis   - 1.5 L of LR (ordered)  - increase po water intake as able - order for free water placed 12 ounces every 6 hours.   - will monitor closely for further recovery     Volume status - No edema/dyspnea/hypoxia. Bp 160'/. Had 500 ml plus unmeasured occurrences yesterday. (Using a urinal but also incontinent).    - Furosemide 40 mg every day was discontinued 7/20/23 w/o improvement    - Please weigh daily. Zero bed    - Continue I/O     3. HTN - Borderline control in HALEY. Bp 150-160's/. No edema  Current regimen: Amlodipine 5 mg every day    - Avoid hypotension. B/p ok at this time   - continue holding diuretic     4. MRSA bacteremia- Will be completing 6 wks of Vancomycin and ceftazidime 8/1/23. Then start po cipro and doxycycline on 8/2 for 6 weeks..    - UC with VRE but only 10-50 k. No treatment required   - Per ID     5. Electrolytes - No acute issues today. Na 146, K 3.8   - Given recent hypernatremia continue to encourage fluid intake     6 Acid base - bicarb improving at 22,   - will monitor closely     7. BMD - Ca 8.7 Phos 4.7, improved from 5.3, albumin 3.2   - Malnutrition present: Getting " "MVI. Would benefit from dietary supplement   - No need for Phos binder at this time   - Vit D 16   - Began D3 - 50 mcq every day 7/18   - Switched to normal diet 7/26     8. Anemia - Hgb 8.3   - Etiology acute illness   - On Iron, folic acid   - Iron studies 6/26/23: Ferritin 2616, Fe 30 IS 21   - Does have DVT history on Eliquis - switched to heparin   - Ferritin too high for IV iron   - Transfusions per primary team      Recommendations were communicated to primary team verbally.     Seen and discussed with Dr. Bryan.     OPAL MAYER, PA-C   Division of Renal Disease and Hypertension  [facility]  [email or web address]  Vocera Web Console    Interval History :   Nursing and provider notes from last 24 hours reviewed.  In the last 24 hours Waldo Rodriguezy been hemodynamically stable. He did not eat breakfast this morning. Continued to encourage pt to eat and drink water. His UOP was 100 ml yesterday with 3 unmeasured episodes, has 150 ml out since midnight. He has no n/v/d. No shortness of breath.    Review of Systems:   As noted above in interval history    Physical Exam:   I/O last 3 completed shifts:  In: 100 [P.O.:100]  Out: 250 [Urine:250]   BP (!) 165/78 (BP Location: Left arm)   Pulse 79   Temp 98.6  F (37  C) (Oral)   Resp 18   Ht 1.778 m (5' 10\")   Wt 134.4 kg (296 lb 6.4 oz)   SpO2 97%   BMI 42.53 kg/m       General : Pt resting, no acute distress   Lungs : no respiratory distress  Cardiac : RRR  LE :trace Edema noted  Dialysis Access : N/A    Labs:   All labs reviewed by me  Electrolytes/Renal -   Recent Labs   Lab Test 07/28/23  0838 07/28/23  0211 07/27/23  2150 07/27/23  0854 07/27/23  0803 07/26/23  1141 07/26/23  0805 07/25/23  0800 07/25/23  0735 07/12/23  1209 07/12/23  0911 07/05/23  0823 07/05/23  0700 07/02/23  1754 07/02/23  1149   NA  --   --   --   --  144  --  144  --  145   < > 143   < > 142   < > 137   POTASSIUM  --   --   --   --  3.8  --  3.8  --  3.8   < > 4.1   < > 4.5   < > 4.3 "   CHLORIDE  --   --   --   --  108*  --  107  --  108*   < > 112*   < > 108*   < > 107   CO2  --   --   --   --  21*  --  20*  --  22   < > 19*   < > 16*   < > 21*   BUN  --   --   --   --  56.3*  --  56.8*  --  57.7*   < > 43.6*   < > 46.3*   < > 39.1*   CR  --   --   --   --  5.75*  --  5.81*  --  6.05*   < > 4.44*   < > 3.30*   < > 2.20*   GLC 92 99 133*   < > 117*   < > 111*   < > 102*   < > 105*   < > 99   < > 128*   MAIKOL  --   --   --   --  8.5*  --  8.4*  --  8.6*   < > 8.3*   < > 7.7*   < > 7.9*   MAG  --   --   --   --   --   --   --   --   --   --  1.6*  --  1.7  --  1.7   PHOS  --   --   --   --  5.3*  --  5.1*  --  6.2*   < > 5.0*   < >  --   --   --     < > = values in this interval not displayed.       CBC -   Recent Labs   Lab Test 07/26/23  0805 07/20/23  0538 07/11/23  0656   WBC 9.5 8.4 8.5   HGB 7.8* 8.0* 8.1*    251 262       LFTs -   Recent Labs   Lab Test 07/27/23  0803 07/26/23  0805 07/25/23  0735 07/06/23  1708 07/06/23  0713 07/04/23  0627 06/22/23  0711 06/20/23  0704   ALKPHOS  --   --   --   --  103 99  --  93   BILITOTAL  --   --   --   --  <0.2 0.2  --  0.2   ALT  --   --   --   --  12 14  --  14   AST  --   --   --   --  9 11  --  14   PROTTOTAL  --   --   --   --  5.3* 5.9*  --  5.9*   ALBUMIN 3.1* 3.0* 3.1*   < > 2.4*  2.4* 2.6*   < > 2.3*    < > = values in this interval not displayed.       Iron Panel -   Recent Labs   Lab Test 06/26/23  0513   IRON 30*   IRONSAT 21   FADI 2,616*       Current Medications:   acetaminophen  975 mg Oral Q8H    amLODIPine  5 mg Oral Daily    [Held by provider] apixaban ANTICOAGULANT  2.5 mg Oral BID    atorvastatin  40 mg Oral Daily    bisacodyl  10 mg Rectal Once    cefTAZidime  2 g Intravenous Q24H    [START ON 8/2/2023] ciprofloxacin  500 mg Oral Q24H LUIS A    [START ON 8/2/2023] doxycycline hyclate  100 mg Oral Q12H LUIS A (08/20)    ferrous sulfate  325 mg Oral Every Other Day    folic acid  1 mg Oral Daily    heparin ANTICOAGULANT  5,000 Units  Subcutaneous Q12H    heparin lock flush  5-20 mL Intracatheter Q24H    insulin aspart  1-7 Units Subcutaneous TID AC    insulin aspart  1-5 Units Subcutaneous At Bedtime    lactobacillus rhamnosus (GG)  1 capsule Oral BID    miconazole   Topical BID    multivitamin w/minerals  1 tablet Oral Daily    senna-docusate  1 tablet Oral BID    sodium chloride (PF)  10 mL Intracatheter Q8H    sodium chloride (PF)  10-40 mL Intracatheter Q8H    sodium chloride        vancomycin  1,000 mg Intravenous Once    vancomycin place montilla - receiving intermittent dosing  1 each Intravenous See Admin Instructions    cholecalciferol  50 mcg Oral Daily       OPAL MAYER, PA-C

## 2023-07-28 NOTE — PROGRESS NOTES
[facility] Nurse Inpatient Assessment     Consulted for: Left heel    7/25/2023: Attempted to see previously for buttocks. Patient continues to flatly refuse assessment for buttocks today. Per bedside RN, buttocks is red but blanchable, patient continues to be incontinent of urine and stool.       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  Postop course complicated by profound deconditioning.  He was admitted to TCU initially on 12/23/2022 for physical rehabilitation, however therapies no longer worked with patient after several months of an in bed therapy and his refusals to mobilize.  Underwent biopsy on 4/7/2023 without evidence of infection.  He was transferred back to [facility] for left total hip arthroplasty and reimplantation on 5/26/23 with Dr. Meyers.  Presented again to [facility] for persistent MRSA bacteremia on 6/17.     Assessment:      Areas visualized during today's visit: Lower extremities     Pressure Injury Location: Left heel     7/19/23 7/28    Last photo: 7/28  Wound type: Pressure Injury     Pressure Injury Stage: Unstageable, hospital acquired, assessed with SANDRA Cook on 7/28  Wound history/plan of care:  Pt known to [facility] service. Pt known to decline cares and repositioning.   7/19 initial assessment: Spent approx 20 mins on negotiating how to move leg to be able to minimally assess wound. Pt agreed to see picture of wound. This writer explained wound to pt using the photo. Discussed with patient if he would like legs  elevated on pillows or boots. He agreed to pillows.    7/20: Assessed wound with Kristen Hadley RN CWOCN. Confirmed thick slough/eschar wound base. Again discussed keeping heels off bed with pt.  7/25/2023: Of note, patient has refused and continues to refuse heel off-loading boots on assessment today.  7/28: pt had heel lift boots in place  Wound base: 100 % slough/eschar     Palpation of the wound bed: boggy      Drainage: scant     Description of drainage: serosanguinous     Measurements (length x width x depth, in cm) 3 x 3 x 0.2 cm     Tunneling N/A     Undermining N/A  Periwound skin: Intact      Color: normal and consistent with surrounding tissue      Temperature: normal   Odor: none  Pain: severe, shooting and stabbing  Pain intervention prior to dressing change: slow and gentle cares   Treatment goal: Infection control/prevention and soften nonviable tissue  STATUS: evolving  Supplies ordered: discussed with RN and discussed with patient     Wound location: left buttock and gluteal cleft    Last photo: 7/28/23  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)  Wound history/plan of care: pt is incontinent and has previously refused assessment of area by [facility]  Wound base: 100 % dermis to open areas on left buttock and base of gluteal cleft     Palpation of the wound bed: normal      Drainage: scant     Description of drainage: serosanguinous     Measurements (length x width x depth, in cm): see photo      Tunneling: N/A     Undermining: N/A  Periwound skin: Intact and Erythema- blanchable      Color: red      Temperature: normal   Odor: none  Pain: moderate, sharp- mostly related to left hip pain  Pain interventions prior to dressing change: slow and gentle cares   Treatment goal: Maintain (prevention of deterioration) and Protection  STATUS: initial assessment  Supplies ordered: at bedside, discussed with RN, and discussed with patient        Wound Location: Left medial thigh    On assessment of patient  bloody mucous noted on blue pad and when cleaning buttocks no mucous noted from rectum but upon cleansing of perineal area there was an indurated area noted to left inner thigh with bloody pus-like drainage. Pointed out abscess to floor RN and left area BRII to drain.      Treatment Plan:     Bilateral buttocks and gluteal cleft wounds: BID and PRN with incontinence episodes  Cleanse wound with dry wipes and martell cleanse and protect spray.   Apply light layer of Triad paste (#149825) to wounds and red skin on bilateral buttocks  With repeat application, do not scrub the paste, only remove soiled paste and reapply.  If complete removal of paste is necessary use baby oil/mineral oil (#476312) and soft wash cloth.  Ensure pt has Isaac-cushion (#351769) while sitting up in the chair.  Use only one Covidien pad in between mattress and pt. No brief in bed.    Left heel wound(s): Every other day   Strongly encourage pt to elevate heels on pillows to float off bed surface at all times. (He agreed to this with WOC)  Cleanse wound with wound cleanser.   Apply light layer of Triad paste (#018899) to wound bed  Cut piece of adaptic to cover paste and wound  Secure with mepilex.  If patient agrees to heel off-loading boots, please apply.    Orders: Reviewed    RECOMMEND PRIMARY TEAM ORDER: None, at this time  Education provided: importance of repositioning, plan of care, wound progress and Infection prevention   Discussed plan of care with: Patient and Nurse  WOC nurse follow-up plan: weekly  Notify WOC if wound(s) deteriorate.  Nursing to notify the Provider(s) and re-consult the WOC Nurse if new skin concern.    DATA:     Current support surface: Bariatric Low air loss (BIN pump, Isolibrium, Pulsate, skin guard, etc)  Containment of urine/stool: Incontinence Protocol  BMI: Body mass index is 42.53 kg/m .   Active diet order: Orders Placed This Encounter      Regular Diet Adult     Output: I/O last 3 completed shifts:  In: 100  [P.O.:100]  Out: 250 [Urine:250]     Labs:   Recent Labs   Lab 07/27/23  0803 07/26/23  0805   ALBUMIN 3.1* 3.0*   HGB  --  7.8*   WBC  --  9.5       Pressure injury risk assessment:   Sensory Perception: 3-->slightly limited  Moisture: 2-->very moist  Activity: 1-->bedfast  Mobility: 2-->very limited  Nutrition: 2-->probably inadequate  Friction and Shear: 1-->problem  Rafael Score: 11    Tresa Khanna RN CWOCN  Pager no longer is use, please contact through [employer] group: [facility] Nurse [facility]  Dept. Office Number: [phone]

## 2023-07-28 NOTE — PROGRESS NOTES
"Orthopedic Surgery Progress Note: 07/28/2023    Subjective:   NAEON. Reviewed RN and consultant notes. Confusion and combativeness, per report. Is pleasant this AM. Awaiting initiation of HD.    Objective:   BP (!) 167/74 (BP Location: Left arm)   Pulse 80   Temp 97.5  F (36.4  C) (Oral)   Resp 18   Ht 1.778 m (5' 10\")   Wt 134.4 kg (296 lb 6.4 oz)   SpO2 93%   BMI 42.53 kg/m    No intake/output data recorded.  General: NAD. Resting comfortably in bed.  Respiratory: Breathing comfortably on RA.  Musculoskeletal: Focused exam of the left lower extremity: dressings removed and incision healing well. Fires GSC, TA, FHL, and EHL minimally. Incredibly sensitive to touch on feet. Overall decreased sensation distally. Brisk capillary refill. Rook boot in place.    Laboratory Data:  Lab Results   Component Value Date    WBC 9.5 07/26/2023    HGB 7.8 (L) 07/26/2023     07/26/2023      Intraoperative cultures   6/21/2023: MRSA, C Acnes, Pseudomonas    Assessment & Plan:   Waldo Bustos is a 72 year old male status post left revision total hip arthroplasty on 5/26/2023 with Dr. Meyers now presenting with left hip prosthetic joint infection status post left hip irrigation and debridement on 6/21/2023 with Dr. Meyers.    Postoperatively noted to have confusion and increasing creatinine. Thorough workup by primary, Neuro, renal suggests metabolic encephalopathy. Possible dialysis this week, per renal.    Anemia postoperatively. Intermittent transfusions while hospitalized. Good response.    CRP downtrending 40> 30> 18. C acnes, MRSA in cultures. Treating with vanc/cefepime, per ID recs. Appreciate assistance.    Ortho will continue to follow every few days. Please do not hesitate to page with questions/concerns.     Ortho Plan:  - Activity: Up with assist until independent. Posterior hip precautions x 3 months (no flexion beyond 90 degrees, no adduction beyond midline, and no internal rotation beyond midline).  - " Weightbearing Status: WBAT LLE.  - Pain Management: Transition from IV to PO as tolerated.  - Antibiotics: Vancomycin and ceftazidime, per ID/primary.  - Diet: OK for a diet from an orthopedic perspective.  - DVT Prophylaxis: SCDs and PTA apixaban okay from an ortho perspective  - Imaging: XR hip to be obtained in mid-late August  - Labs: Labs PRN and per primary  - Bracing/Splinting: Abduction pillow or regular pillow while in bed.  - Dressings: Dressing removed. Okay to leave incision to air.  - Drains: GUERRERO drain removed 7/3  - Physical Therapy/Occupational Therapy: Evaluation and treatment. Signed off week of 7/12 due to patient lack of participation.  - Cultures: Intraoperative cultures 6/21/2023.  - Follow-up: Clinic with Dr. Meyers's team TBD (pending dispo plan). Will plan to get XR at that time.    - Disposition: TCU. Okay to discharge from an ortho perspective.     Hannah Starr MD, PGY-4  Orthopedics  Pager: [phone]

## 2023-07-29 NOTE — PROGRESS NOTES
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  Postop course complicated by profound deconditioning.  He was admitted to TCU initially on 12/23/2022 for physical rehabilitation, however therapies no longer worked with patient after several months of an in bed therapy and his refusals to mobilize.  Underwent biopsy on 4/7/2023 without evidence of infection.  He was transferred back to [facility] for left total hip arthroplasty and reimplantation on 5/26/23 with Dr. Meyers.  Presented again to [facility] for persistent MRSA bacteremia on 6/17. His course has been c/b HALEY. Baseline creat 1.0 as recent as 6/19/23).        HALEY stage II  Recurrent HALEY in setting of significant hemodynamic changes including diuretics and Vancomycin use   Since his admission, have had multiple HALEY episodes  - continue holding diuresis. No acute indication for iHD initiation today  - increase po water intake as able  - will monitor closely for further recovery     Volume status - No edema/dyspnea/hypoxia.    - Please weigh daily. Zero bed    - Continue I/O     3. HTN - Borderline control in HALEY. Bp 150-160's. No edema  Current regimen: Amlodipine 5 mg every day    - Avoid hypotension. Reasonable to increase Amlodipine to 10mg qday   - continue holding diuretic     4. MRSA bacteremia- Will be completing 6 wks of Vancomycin and ceftazidime 8/1/23. Then start po cipro and doxycycline on 8/2 for 6 weeks..    - UC with VRE but only 10-50 k. No treatment required   - Per ID     5. Electrolytes   - Given recent hypernatremia continue to encourage fluid intake     6 Acid base -  "bicarb improving at 22 (7/28/2023)  - will monitor closely     7. BMD - Ca 8.7 Phos 4.7, improved from 5.3, albumin 3.2   - Malnutrition present: Getting MVI. Would benefit from dietary supplement   - No need for Phos binder at this time   - Vit D 16   - Began D3 - 50 mcq every day 7/18   - Switched to normal diet 7/26     8. Anemia - Hgb 8.3 on 7/28   - Etiology acute illness   - On Iron, folic acid   - Iron studies 6/26/23: Ferritin 2616, Fe 30 IS 21   - Does have DVT history on Eliquis - switched to heparin   - Ferritin too high for IV iron   - Transfusions per primary team    Carlos Enrique Wilson MD   Division of Renal Disease and Hypertension  [facility] [place] [facility]  [employer]  [employer] Web Console    Interval History :   Nursing and provider notes from last 24 hours reviewed.  Intermittently HTN throughout the day yesterday  UOP recorded as 300cc's yesterday  Creatinine Pending from 5.35  Na Pending from 146    Review of Systems:   As noted above in interval history    Physical Exam:   I/O last 3 completed shifts:  In: -   Out: 150 [Urine:150]   BP (!) 172/90 (BP Location: Right arm, Patient Position: Semi-Fuentes's)   Pulse 88   Temp 97.4  F (36.3  C) (Oral)   Resp 16   Ht 1.778 m (5' 10\")   Wt 134.4 kg (296 lb 6.4 oz)   SpO2 96%   BMI 42.53 kg/m       General : Pt resting, no acute distress   Lungs : no respiratory distress  Cardiac : RRR  LE :trace Edema noted  Dialysis Access : N/A    Labs:   All labs reviewed by me  Electrolytes/Renal -   Recent Labs   Lab Test 07/29/23  0229 07/28/23  2230 07/28/23  1819 07/28/23  1734 07/28/23  1025 07/27/23  0854 07/27/23  0803 07/26/23  1141 07/26/23  0805 07/12/23  1209 07/12/23  0911 07/05/23  0823 07/05/23  0700 07/02/23  1754 07/02/23  1149   NA  --   --   --   --  146*  --  144  --  144   < > 143   < > 142   < > 137   POTASSIUM  --   --   --   --  3.8  --  3.8  --  3.8   < > 4.1   < > 4.5   < > 4.3   CHLORIDE  --   --   --   --  109*  --  108*  --  107   < > 112*   < > 108*   < > " 107   CO2  --   --   --   --  22  --  21*  --  20*   < > 19*   < > 16*   < > 21*   BUN  --   --   --   --  55.1*  --  56.3*  --  56.8*   < > 43.6*   < > 46.3*   < > 39.1*   CR  --   --   --   --  5.35*  --  5.75*  --  5.81*   < > 4.44*   < > 3.30*   < > 2.20*   GLC 88 106* 88   < > 111*   < > 117*   < > 111*   < > 105*   < > 99   < > 128*   MAIKOL  --   --   --   --  8.7*  --  8.5*  --  8.4*   < > 8.3*   < > 7.7*   < > 7.9*   MAG  --   --   --   --   --   --   --   --   --   --  1.6*  --  1.7  --  1.7   PHOS  --   --   --   --  4.7*  --  5.3*  --  5.1*   < > 5.0*   < >  --   --   --     < > = values in this interval not displayed.       CBC -   Recent Labs   Lab Test 07/28/23  1025 07/26/23  0805 07/20/23  0538   WBC 9.9 9.5 8.4   HGB 8.3* 7.8* 8.0*    282 251       LFTs -   Recent Labs   Lab Test 07/28/23  1025 07/27/23  0803 07/26/23  0805 07/06/23  1708 07/06/23  0713 07/04/23  0627 06/22/23  0711 06/20/23  0704   ALKPHOS  --   --   --   --  103 99  --  93   BILITOTAL  --   --   --   --  <0.2 0.2  --  0.2   ALT  --   --   --   --  12 14  --  14   AST  --   --   --   --  9 11  --  14   PROTTOTAL  --   --   --   --  5.3* 5.9*  --  5.9*   ALBUMIN 3.2* 3.1* 3.0*   < > 2.4*  2.4* 2.6*   < > 2.3*    < > = values in this interval not displayed.       Iron Panel -   Recent Labs   Lab Test 06/26/23  0513   IRON 30*   IRONSAT 21   FADI 2,616*       Current Medications:   acetaminophen  975 mg Oral Q8H    amLODIPine  5 mg Oral Daily    [Held by provider] apixaban ANTICOAGULANT  2.5 mg Oral BID    atorvastatin  40 mg Oral Daily    bisacodyl  10 mg Rectal Once    cefTAZidime  2 g Intravenous Q24H    [START ON 8/2/2023] ciprofloxacin  500 mg Oral Q24H LUIS A    [START ON 8/2/2023] doxycycline hyclate  100 mg Oral Q12H LUIS A (08/20)    ferrous sulfate  325 mg Oral Every Other Day    folic acid  1 mg Oral Daily    heparin ANTICOAGULANT  5,000 Units Subcutaneous Q12H    heparin lock flush  5-20 mL Intracatheter Q24H    insulin  aspart  1-7 Units Subcutaneous TID AC    insulin aspart  1-5 Units Subcutaneous At Bedtime    lactobacillus rhamnosus (GG)  1 capsule Oral BID    miconazole   Topical BID    multivitamin w/minerals  1 tablet Oral Daily    senna-docusate  1 tablet Oral BID    sodium chloride (PF)  10 mL Intracatheter Q8H    sodium chloride (PF)  10-40 mL Intracatheter Q8H    vancomycin place montilla - receiving intermittent dosing  1 each Intravenous See Admin Instructions    cholecalciferol  50 mcg Oral Daily       Carlos Enrique Wilson MD

## 2023-07-29 NOTE — PROGRESS NOTES
"VS:    /58   Pulse 88   Temp 98.3  F (36.8  C) (Oral)   Resp 20   Ht 1.778 m (5' 10\")   Wt 134.4 kg (296 lb 6.4 oz)   SpO2 94%   BMI 42.53 kg/m       O2: SpO2 >90% and stable on RA. LS clear and equal bilaterally. Denies chest pain and SOB.    Output: Minimally uses urinal and has become more incontinent of bladder this shift   Last BM: 7/28, denies abdominal discomfort. BS active / passing flatus.    Activity: Assist of 2 turning and repositioning in bed. No resistance this shift and able to get a good bed bath completed.   Skin:  Redness in groins, unable to complete full skin assessment. Not wanting his LLE touched.   Pain: Pain managed with tylenol.   CMS: Intact, is alert and oriented to self, disoriented time, place, and situation.  Denies numbness and tingling.   Dressing: To right chest CL   Diet: Regular diet. Wasn't wanting breakfast or lunch. Denies nausea/vomiting. Ordered a meal for him and he ate 100%   LDA: Right chest wall single lumen PICC  Patent.   Equipment: IV pole, personal belongings,    Plan: Contact precautions maintained.  Call light within reach, pt able to make needs known. Continue with plan of care.   Additional Info:       "

## 2023-07-29 NOTE — PROGRESS NOTES
"Attempted to assess left heel under boot and removed boot to look at heel and he said \"no, no, no, not there\", I indicated the need to assess heel for  further skin breakdown, and he attempted to kick, unsuccessfully, at me. He said \"don't, don't, don't\". I reapplied his boot velcro on his left foot.  "

## 2023-07-29 NOTE — CARE PLAN
"Shift: 2300 - 0730    VS: BP (!) 172/90 (BP Location: Right arm, Patient Position: Semi-Fuentes's)   Pulse 88   Temp 97.4  F (36.3  C) (Oral)   Resp 16   Ht 1.778 m (5' 10\")   Wt 134.4 kg (296 lb 6.4 oz)   SpO2 96%   BMI 42.53 kg/m       O2: SpO2 >90% and stable on RA. LS clear and equal bilaterally. Denies chest pain and SOB.    Output: Uses urinal, incontinent bladder this shift   Last BM: 7/28, denies abdominal discomfort. BS active / passing flatus.    Activity: Assist of 2 turning and repositioning in bed   Skin:  Redness in groins, unable to complete full skin assessment. \"Please don't touch that foot\"   Pain: Pain managed with    CMS: Intact, is alert and oriented to self, disoriented time, place, and situation.  Denies numbness and tingling.   Dressing: CDI   Diet: Regular diet. Denies nausea/vomiting.   LDA: Right chest wall single lumen PICC  Patent.   Equipment: IV pole, personal belongings,    Plan: Contact precautions maintained.  Call light within reach, pt able to make needs known. Continue with plan of care.   Additional Info:        "

## 2023-07-29 NOTE — PROGRESS NOTES
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  Postop course complicated by profound deconditioning.  He was admitted to TCU initially on 12/23/2022 for physical rehabilitation, however there was no progress with therapies after several months of in bed therapy and his refusals to mobilize. Underwent biopsy on 4/7/2023 without evidence of infection.  He was transferred back to [facility] for left total hip arthroplasty and reimplantation on 5/26/23 with Dr. Meyers.  Presented again to [facility] for persistent MRSA bacteremia on 6/17. Pt having ongoing encephalopathy and clearly lacks capacity, current though is 2/2 renal failure and uremia. Nephro following, holding off on HD initiation as pt may be pending renal recovery.     Brief Hospital Summary:    Pt was admitted to continue infectious workup with MRSA bacteremia.  Admission blood cultures were positive, ID and Ortho were consulted. KEILA was obtained on 6/19, which was negative for endocarditis, ortho took for washout/debridement of hip on 6/21. ID recommending 6 weeks IV abx (6/21-8/1) via tunneled internal jugular line (nephrology asked to defer PICC) followed by 6 weeks of PO cipro/doxy. He has developed persistent encephalopathy that is felt to be multifactorial. Neurology consulted and there was concern for contribution of cefepime neurotoxicity. EEG was negative for seizures and CT head negative. He did have some mild improvement after stopping cefepime, but has not returned to baseline.  Complicating the situation further is that he has developed worsening acute renal failure with now concern for uremia contributing to his mental status. Care conference on 7/21 with brother Ivan as surrogate decision maker, planned for short trial of dialysis to determine if pt has any meaningful improvement.    Today:  - Follow up nephro recs: still holding on HD initiation  - Defer further IVF to nephro  - Cont IV vanc and ceftazidime through 8/1 then switch to PO doxy + cipro through 9/13  - Cont subQ heparin in place of DOAC in case needs AC held for line + HD initiation in coming days   - Renal panel today still pending, I spoke to nephro about this and they would still hold off on line placement and if Na is 145 or above would agree with further IVF like 1L of D5LR  >Update: Cr improved, Na 146 thus will give IVF and recheck tomorrow     Goals of Care  Complex medical course over the past 6+ months. Was in [facility] since 12/2022 due to profound deconditioning, but ultimately refused to participate meaningfully in therapies. Was readmitted in 6/23 with MRSA bacteremia after repeat hip surgery in 4/23. His hospital course has been complicated by acute renal failure, multifactorial encephalopathy and apathy/refusal to participate in therapies or medical cares. He was assessed by psychiatry on 7/18 and felt to be non-decisional regarding complex medical decisions or disposition. His brother (Ivan) is his legal POA, but Don does not have a healthcare directive. Care conference held on 7/21 with medicine, palliative, renal, ZEB, RN manager - discussed the above and options including a short trial (~2 weeks) of dialysis to see if it improves his mental function and willingness/ability to participate in therapies vs moving towards a comfort-based approach. Ultimately, Ivan decided to move forward with the trial of dialysis and a plan to reassess in ~2 weeks to determine next steps. We discussed that if he does  not meaningfully improve, outpatient dialysis would likely not be an option due to his profound immobility.  - Initially planned to move forward with short trial of dialysis.  - Will monitor for improvement with a plan to reassess after dialysis trial to make next decisions regarding goals of care.  - Continue full code for now. Ivan to discuss more with his brothers.    Multifactorial encephalopathy - likely toxic metabolic, uremic, delirium, depression contributing  Noted to have acutely worsening mental status around ~7/4, which was initially felt to be related to cefepime neurotoxicity. Neurology consulted. EEG without seizures. CT head, ammonia and VBG wnl. Completed 5 days of antibiotics. No clinical or objective findings concerning for worsening infection. Does have worsening renal failure with rising BUN, so some consideration that uremia could be contributing, but overall not hugely elevated. Also concern that depression is contributing.  - Delirium precautions  - Nephrology following  - After care conference on 7/21, planned for a short trial of inpatient dialysis however nephro not in agreement with starting HD and waiting for renal recovery  - Psychiatry consulted on 7/18  for capacity assessment - pt not decisional regarding complex medical decisions or disposition.  -If no plans for HD per nephro and continued encephalopathy, will consult neuro and consider MRI.     MRSA bacteremia due to possible left hip septic arthritis vs other source Blood cultures: 6/14, 6/15 + for MRSA (4/4), and 6/16 (1/2 GPCs) and 6/17 (2/2) GPCs. - started vancomycin 6/15.   6/17 sinovial fluid cultures with staph and klebsiella. Purulent drainage noted at incision site at TCU, CT left hip concerning for infection (no mention of joint, mostly anterior in soft tissue) and couldn't exclude abscess/inf  - ID intermittently following through hospital course. Last seen 7/21.  - Continue ceftazidime, vancomycin for C.acnes,  Pseudomonas, MRSA PJI until 8/1/2023  - Start oral doxycycline and ciprofloxacin on 8/2/2023 through 9/13 (total 12 weeks therapy)  - ID follow up ~8/21 (can be inpatient team if remains inpatient or can be outpatient)     Acute Kidney Injury / Acute renal failure - multifactorial, see above/below  Nephrology consulted, appreciate recs. Baseline creatinine 1.0 or 1.1. Etiology: suspect combo of sepsis/inflammation, hypovolemia, blood loss anemia following surgery, vancomycin. UA recollected on 7/19 with granular casts suggesting ongoing dense ATN. Creatinine continuing to uptrend, however no acidosis, electrolyte abnormalities or evidence of hypervolemia. Some concern that uremia could be contributing to his encephalopathy. After GOC on 7/21, planned to pursue short trial of dialysis.  - Nephrology consulted  - Initially planned for short trial of HD however nephro holding off on line placement and HD as they think pt has impending renal recovery.   - See above for GOC converation.  Lab Results   Component Value Date    CR 4.86 (H) 07/29/2023    CR 5.35 (H) 07/28/2023    CR 5.75 (H) 07/27/2023    CR 5.81 (H) 07/26/2023    CR 6.05 (H) 07/25/2023       Asymptomatic bacteruria (VRE)  UA obtained on 7/19 to assess for casts, reflexed to culture which is growing VRE. Labs and vitals with low concern for new infection. Discussed with ID who recommended against treatment.    Bilateral LE edema due to volume overload from HALEY and iatrogenic from blood transfusions  Hyperesthesia of bilateral lower extremities (no signs of cellulitis as of 7/13)  - Holding diuresis starting 7/20    L heel pressure wound, unstagable  Noted earlier in pts hospitalization (~6/20), but at that time he refused assessment. Noted again by nursing to be worsening on 7/19 and WOCN consult was placed. Pt was agreeable and had wound assessed. He has been mostly non-agreeable to offloading or other preventative measures recommended by RN and WOCN, but  will sometimes allow it with significant encouragement.  - WOCN consult  - Continue to encourage pt to utilize offloading techniques and to participate in wound cares.    Hypernatremia - resolved  Uptrending recently, 147 on 7/18. Likely volume down. S/p D5W on 7/18, resolved.  - Monitor. Holding diuretics.    Hypoxemic Respiratory Failure due to immobility, surgery - resolved  - continue to follow; encourage IS    Acute blood loss anemia following surgery, complicated by use of blood thinner  - 3 units day after surgery did joint washout (6/22) for acute blood loss anemia, 1 unit 6/25  - watch for bleeding; hold apixaban if need be - had unprovoked dvt in 2018 and would be on eliquis for life, high risk of recurrent DVT given recent procedures and illness/immobility  - iron and folic acid daily    S/p left hip explantation of left hip antibiotic spacer and revision of left total hip arthroplasty (5/26/2023)  - ortho performed washout 6/21  -Activity: 50% PWB LLE with posterior hip precautions [id number]  -AFO ordered for foot drop but patient refusing  -PT/OT  -Knee to be kept even in a foam leg elevator to relax sciatic nerve  -Pain: holding robaxin 4 times a day with mental status changes (discontinuing), APAP PRN  -stop tramadol (7/11) due to renal function  -increase oxycodone 2.5-5 mg q6h PRN- taking very minimal amts so unlikely to be contributing to mental status    Hx of unprovoked DVT in 2018   - Hold PTA apixiban pending line placement / possible HD initiation     Type 2 diabetes mellitus  - Last hemoglobin A1c 5.6% on 6/16/2023.  On glipizide, metformin, Actos, and Victoza prior to hospitalizations and surgery.   - continue ISS    Constipation, improved  -Continue Colace 100 mg twice daily  -Continue senna 1 tablet twice daily-- had briefly increased to 2 tab twice daily due to lack of stool, now improved  -Continue MiraLAX daily     Hypomagnesemia  -monitor and replete      Severe malnutrition in the  context of acute on chronic illness  Goal for patient is to consume % of meals TID. Goal is 8996-1140 kcals/daily. Protein needs- 100-126 grams/daily.   -RD consult and appreciate recs  -regular diet  -Weekly weights, daily I/O     Adjustment disorder with depressed mood  Prolonged grief disorder  Patient has been with depressed mood in setting of ongoing health issues, as evidenced by lack of motivation to work with therapies during both TCU admissions.  Past history of passive suicidal statements. Psychiatry saw patient during hospital admission on 5/31 due to lack of motivation with therapies. Patient declined interest in starting any psychiatric medications. Was willing to engage with health psychology.   -Outpatient health psychology consult  -Continue to monitor for willingness to start antidepressant therapy  -tried to discuss mood 7/15 but he didn't fully answer questions, seemed to talk about different aspect of his health that wasn't quite related to mood     HTN:   - Started amlodipine 7/16    Stable and Resolved Issues:  Hyperlipidemia-continue PTA Lipitor 40 mg daily  JAIR-CPAP when sleeping       Diet: Regular Diet Adult    DVT Prophylaxis: Heparin SQ  Tran Catheter: Not present  Lines: PRESENT      CVC Single Lumen Right Internal jugular Non - valved (open ended);Tunneled;Power injectable-Site Assessment: WDL;Unable to visualize      Cardiac Monitoring: None  Code Status: Full Code      Clinically Significant Risk Factors         # Hypernatremia: Highest Na = 146 mmol/L in last 2 days, will monitor as appropriate      # Hypoalbuminemia: Lowest albumin = 2.2 g/dL at 7/8/2023  8:51 AM, will monitor as appropriate    # Acute Kidney Injury, unspecified: based on a >150% or 0.3 mg/dL increase in last creatinine compared to past 90 day average, will monitor renal function          # Severe Malnutrition: based on nutrition assessment         Disposition Plan           Jesse Ring DO  Hospitalist  "Service, GOLD TEAM 21  [facility]  Securely message with [employer] (more info)  Text page via [employer] Paging/Directory   See signed in provider for up to date coverage information  ______________________________________________________________________    Interval History   Had long conversation with Ivan on phone yesterday. He is frustrated that pt is not getting dialysis to see if this improves pt's mental status as was the plan in his care conference with the provider and nephrologist last week.     Pt today unchanged. Confused, does not remember me or where is is located or what hospital this is. He kept mentioning \"he's trying to deal with that typical thing\" but could not describe what this could be. Denies any symptoms at this time.     Physical Exam   Vital Signs: Temp: 97.5  F (36.4  C) Temp src: Oral BP: (!) 141/59 Pulse: 87   Resp: 18 SpO2: 93 % O2 Device: None (Room air)    Weight: 296 lbs 6.4 oz    General Appearance:  NAD. Lying comfortably in bed.  Respiratory: CTAB. No increased WOB.  Cardiovascular: RRR.  GI: Soft. Non-tender. Non-distended.  Skin: No rash.  Other:  Moving all extremities. Normal affect. Intermittent myoclonus and jerking movements.     Medical Decision Making         Data     I have personally reviewed the following data over the past 24 hrs:    N/A  \   N/A   / N/A     146 (H) 109 (H) 51.9 (H) /  97   3.6 23 4.86 (H) \     ALT: N/A AST: N/A AP: N/A TBILI: N/A   ALB: 3.2 (L) TOT PROTEIN: N/A LIPASE: N/A       Lab Results   Component Value Date    VANCOMYCIN 21.3 07/27/2023     Lab Results   Component Value Date    GLC 97 07/29/2023     (H) 07/29/2023    GLC 88 07/29/2023     "

## 2023-07-30 NOTE — PROGRESS NOTES
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  Postop course complicated by profound deconditioning.  He was admitted to TCU initially on 12/23/2022 for physical rehabilitation, however there was no progress with therapies after several months of in bed therapy and his refusals to mobilize. Underwent biopsy on 4/7/2023 without evidence of infection.  He was transferred back to [facility] for left total hip arthroplasty and reimplantation on 5/26/23 with Dr. Meyers.  Presented again to [place] for persistent MRSA bacteremia on 6/17. Pt having ongoing encephalopathy and clearly lacks capacity, current though is 2/2 renal failure and uremia. Nephro following, holding off on HD initiation as pt may be pending renal recovery.     Brief Hospital Summary:    Pt was admitted to continue infectious workup with MRSA bacteremia.  Admission blood cultures were positive, ID and Ortho were consulted. KEILA was obtained on 6/19, which was negative for endocarditis, ortho took for washout/debridement of hip on 6/21. ID recommending 6 weeks IV abx (6/21-8/1) via tunneled internal jugular line (nephrology asked to defer PICC) followed by 6 weeks of PO cipro/doxy. He has developed persistent encephalopathy that is felt to be multifactorial. Neurology consulted and there was concern for contribution of cefepime neurotoxicity. EEG was negative for seizures and CT head negative. He did have some mild improvement after stopping cefepime, but has not returned to baseline.  Complicating the situation further is that he has developed worsening acute renal failure with now concern for uremia contributing to his mental status. Care conference on 7/21 with brother Ivan as surrogate decision maker, planned for short trial of dialysis to determine if pt has any meaningful improvement.    Today:  - Follow up nephro recs: still holding on HD initiation  >Worsening hypernatremia  >Encourage PO intake  >Will give 75ml/hr MIVF of D5W to replace just over 1.5L of free water deficit within the next day, will touch base w/ nephro regarding further management of this tomorrow  >PM Na check, if worsening will give small D5W bolus  >Increase amlodipine to 10mg every day per nephro recs   - Cont IV vanc and ceftazidime through 8/1 then switch to PO doxy + cipro through 9/13  - Cont subQ heparin in place of DOAC in case needs AC held for line + HD initiation in coming days     Goals of Care  Complex medical course over the past 6+ months. Was in [place] [facility]CU since 12/2022 due to profound deconditioning, but ultimately refused to participate meaningfully in therapies. Was readmitted in 6/23 with MRSA bacteremia after repeat hip surgery in 4/23. His hospital course has been complicated by acute renal failure, multifactorial encephalopathy and apathy/refusal to participate in therapies or medical cares. He was assessed by psychiatry on 7/18 and felt to be non-decisional regarding complex medical decisions or disposition. His brother (Ivan) is his legal POA, but Don does not have a healthcare directive. Care conference held on 7/21 with medicine, palliative, renal, SW, RN manager - discussed the above and options including a short trial (~2 weeks) of dialysis to see if it improves his mental function and willingness/ability to participate in therapies vs moving towards a comfort-based approach. Ultimately, Ivan decided to move forward with the trial of dialysis and a plan to reassess in ~2 weeks to  determine next steps. We discussed that if he does not meaningfully improve, outpatient dialysis would likely not be an option due to his profound immobility.  - Initially planned to move forward with short trial of dialysis.  - Will monitor for improvement with a plan to reassess after dialysis trial to make next decisions regarding goals of care.  - Continue full code for now. Ivan to discuss more with his brothers.    Multifactorial encephalopathy - likely toxic metabolic, uremic, delirium, depression contributing  Noted to have acutely worsening mental status around ~7/4, which was initially felt to be related to cefepime neurotoxicity. Neurology consulted. EEG without seizures. CT head, ammonia and VBG wnl. Completed 5 days of antibiotics. No clinical or objective findings concerning for worsening infection. Does have worsening renal failure with rising BUN, so some consideration that uremia could be contributing, but overall not hugely elevated. Also concern that depression is contributing.  - Delirium precautions  - Nephrology following  - After care conference on 7/21, planned for a short trial of inpatient dialysis however nephro not in agreement with starting HD and waiting for renal recovery  - Psychiatry consulted on 7/18  for capacity assessment - pt not decisional regarding complex medical decisions or disposition.  -If no plans for HD per nephro and continued encephalopathy, will consider re-consulting neuro and consider MRI, however pt would likely need significant sedation for this (such as general anesthesia) given his mental status and agitation thus holding off on this for now    MRSA bacteremia due to possible left hip septic arthritis vs other source Blood cultures: 6/14, 6/15 + for MRSA (4/4), and 6/16 (1/2 GPCs) and 6/17 (2/2) GPCs. - started vancomycin 6/15.   6/17 sinovial fluid cultures with staph and klebsiella. Purulent drainage noted at incision site at TCU, CT left hip concerning for  infection (no mention of joint, mostly anterior in soft tissue) and couldn't exclude abscess/inf  - ID intermittently following through hospital course. Last seen 7/21.  - Continue ceftazidime, vancomycin for C.acnes, Pseudomonas, MRSA PJI until 8/1/2023  - Start oral doxycycline and ciprofloxacin on 8/2/2023 through 9/13 (total 12 weeks therapy)  - ID follow up ~8/21 (can be inpatient team if remains inpatient or can be outpatient)     Acute Kidney Injury / Acute renal failure - multifactorial, see above/below  Nephrology consulted, appreciate recs. Baseline creatinine 1.0 or 1.1. Etiology: suspect combo of sepsis/inflammation, hypovolemia, blood loss anemia following surgery, vancomycin. UA recollected on 7/19 with granular casts suggesting ongoing dense ATN. Creatinine continuing to uptrend, however no acidosis, electrolyte abnormalities or evidence of hypervolemia. Some concern that uremia could be contributing to his encephalopathy. After GOC on 7/21, planned to pursue short trial of dialysis.  - Nephrology consulted  - Initially planned for short trial of HD however nephro holding off on line placement and HD as they think pt has impending renal recovery.   - See above for GOC converation.  Lab Results   Component Value Date    CR 4.66 (H) 07/30/2023    CR 4.86 (H) 07/29/2023    CR 5.35 (H) 07/28/2023    CR 5.75 (H) 07/27/2023    CR 5.81 (H) 07/26/2023       Asymptomatic bacteruria (VRE)  UA obtained on 7/19 to assess for casts, reflexed to culture which is growing VRE. Labs and vitals with low concern for new infection. Discussed with ID who recommended against treatment.    Bilateral LE edema due to volume overload from HALEY and iatrogenic from blood transfusions  Hyperesthesia of bilateral lower extremities (no signs of cellulitis as of 7/13)  - Holding diuresis starting 7/20    L heel pressure wound, unstagable  Noted earlier in pts hospitalization (~6/20), but at that time he refused assessment. Noted  again by nursing to be worsening on 7/19 and WOCN consult was placed. Pt was agreeable and had wound assessed. He has been mostly non-agreeable to offloading or other preventative measures recommended by RN and WOCN, but will sometimes allow it with significant encouragement.  - WOCN consult  - Continue to encourage pt to utilize offloading techniques and to participate in wound cares.    Hypernatremia - resolved & recurrent   Uptrending recently, 147 on 7/18. Likely volume down. S/p D5W on 7/18, resolved.  - Monitor.  - Nephro following  - D5W for free water deficits as needed  - Encourage PO intake     Hypoxemic Respiratory Failure due to immobility, surgery - resolved  - continue to follow; encourage IS    Acute blood loss anemia following surgery, complicated by use of blood thinner  - 3 units day after surgery did joint washout (6/22) for acute blood loss anemia, 1 unit 6/25  - watch for bleeding; hold apixaban if need be - had unprovoked dvt in 2018 and would be on eliquis for life, high risk of recurrent DVT given recent procedures and illness/immobility  - iron and folic acid daily    S/p left hip explantation of left hip antibiotic spacer and revision of left total hip arthroplasty (5/26/2023)  - ortho performed washout 6/21  -Activity: 50% PWB LLE with posterior hip precautions [id number]  -AFO ordered for foot drop but patient refusing  -PT/OT  -Knee to be kept even in a foam leg elevator to relax sciatic nerve  -Pain: holding robaxin 4 times a day with mental status changes (discontinuing), APAP PRN  -stop tramadol (7/11) due to renal function  -increase oxycodone 2.5-5 mg q6h PRN- taking very minimal amts so unlikely to be contributing to mental status    Hx of unprovoked DVT in 2018   - Hold PTA apixiban pending line placement / possible HD initiation     Type 2 diabetes mellitus  - Last hemoglobin A1c 5.6% on 6/16/2023.  On glipizide, metformin, Actos, and Victoza prior to hospitalizations and  surgery.   - continue ISS    Constipation, improved  -Continue Colace 100 mg twice daily  -Continue senna 1 tablet twice daily-- had briefly increased to 2 tab twice daily due to lack of stool, now improved  -Continue MiraLAX daily     Hypomagnesemia  -monitor and replete      Severe malnutrition in the context of acute on chronic illness  Goal for patient is to consume % of meals TID. Goal is 9764-7596 kcals/daily. Protein needs- 100-126 grams/daily.   -RD consult and appreciate recs  -regular diet  -Weekly weights, daily I/O     Adjustment disorder with depressed mood  Prolonged grief disorder  Patient has been with depressed mood in setting of ongoing health issues, as evidenced by lack of motivation to work with therapies during both TCU admissions.  Past history of passive suicidal statements. Psychiatry saw patient during hospital admission on 5/31 due to lack of motivation with therapies. Patient declined interest in starting any psychiatric medications. Was willing to engage with health psychology.   -Outpatient health psychology consult  -Continue to monitor for willingness to start antidepressant therapy  -tried to discuss mood 7/15 but he didn't fully answer questions, seemed to talk about different aspect of his health that wasn't quite related to mood     HTN:   - Started amlodipine 7/16    Stable and Resolved Issues:  Hyperlipidemia-continue PTA Lipitor 40 mg daily  JAIR-CPAP when sleeping       Diet: Regular Diet Adult    DVT Prophylaxis: Heparin SQ  Tran Catheter: Not present  Lines: PRESENT      CVC Single Lumen Right Internal jugular Non - valved (open ended);Tunneled;Power injectable-Site Assessment: WDL      Cardiac Monitoring: None  Code Status: Full Code      Clinically Significant Risk Factors         # Hypernatremia: Highest Na = 146 mmol/L in last 2 days, will monitor as appropriate  # Hypocalcemia: Lowest Ca = 8.4 mg/dL in last 2 days, will monitor and replace as appropriate     #  "Hypoalbuminemia: Lowest albumin = 2.2 g/dL at 7/8/2023  8:51 AM, will monitor as appropriate    # Acute Kidney Injury, unspecified: based on a >150% or 0.3 mg/dL increase in last creatinine compared to past 90 day average, will monitor renal function          # Severe Malnutrition: based on nutrition assessment         Disposition Plan           Jesse Ring DO  Hospitalist Service, GOLD TEAM 21  [facility]  Securely message with [employer] (more info)  Text page via [facility] Paging/Directory   See signed in provider for up to date coverage information  ______________________________________________________________________    Interval History   Pt today initially thought this was a [facility] but later in interview realized it was a hospital, however he could not recall the name of the hospital which I've been telling him daily and asking him to remember. Still does not know my name and called me \"Dr. Shi\" today. He feels frustrated but otherwise feels okay.     Physical Exam   Vital Signs: Temp: 98.1  F (36.7  C) Temp src: Oral BP: (!) 166/69 Pulse: 82   Resp: 20 SpO2: 96 % O2 Device: None (Room air)    Weight: 296 lbs 6.4 oz    General Appearance:  NAD. Lying comfortably in bed.  Respiratory: CTAB. No increased WOB.  Cardiovascular: RRR.  GI: Soft. Non-tender. Non-distended.  Skin: No rash.  Other:  Moving all extremities. Normal affect. Intermittent myoclonus and jerking movements.     Medical Decision Making         Data     I have personally reviewed the following data over the past 24 hrs:    8.3  \   7.6 (L)   / 294     148 (H) 112 (H) 50.4 (H) /  91   3.6 24 4.66 (H) \     ALT: N/A AST: N/A AP: N/A TBILI: N/A   ALB: 3.1 (L) TOT PROTEIN: N/A LIPASE: N/A       Lab Results   Component Value Date    VANCOMYCIN 21.3 07/27/2023     Lab Results   Component Value Date    GLC 91 07/30/2023     (H) 07/30/2023     (H) 07/29/2023     "

## 2023-07-30 NOTE — PLAN OF CARE
VS: Temp: 98.1  F (36.7  C) Temp src: Oral BP: (!) 166/69 Pulse: 82   Resp: 20 SpO2: 96 % O2 Device: None (Room air)      O2: SpO2 > 95% and stable on RA. LS clear and equal bilaterally. Denies chest pain and SOB.    Output: Voids spontaneously without difficulty. Pt is incontinent B&B. Uses urinal sometimes.   Last BM: 7/29/2023. denies abdominal discomfort. BS active / passing flatus.    Activity: Not OOB this shift, but pt was agreeable to shift weight & reposition while changing him.    Skin: WDL except, blanchable redness on coccyx, buttocks, abdominal folds & groins. L hip incision healing, L heel wound.    Pain: Pain managed with scheduled Tylenol & PRN oxycodone ( 5 mg)   CMS: Intact, A&O x3 disoriented to time. Denies numbness and tingling.   Dressing: L heel dressing is CDI. L hip incision is open to air.    Diet: Regular diet. Denies nausea/vomiting.   BG check at bedtime 126, BG overnight 114   LDA: R upper chest CVC single lumen, heparin locked.    Equipment: IV pole, personal belongings,    Plan: Contact precautions maintained / Continue with plan of care. Call light within reach, pt able to make needs known.    Additional Info:

## 2023-07-30 NOTE — PROGRESS NOTES
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  Postop course complicated by profound deconditioning.  He was admitted to TCU initially on 12/23/2022 for physical rehabilitation, however therapies no longer worked with patient after several months of an in bed therapy and his refusals to mobilize.  Underwent biopsy on 4/7/2023 without evidence of infection.  He was transferred back to [facility] for left total hip arthroplasty and reimplantation on 5/26/23 with Dr. Meyers.  Presented again to [place] for persistent MRSA bacteremia on 6/17. His course has been c/b HALEY. Baseline creat 1.0 as recent as 6/19/23).        HALEY stage II  Recurrent HALEY in setting of significant hemodynamic changes including diuretics and Vancomycin use   Since his admission, have had multiple HALEY episodes  - continue holding diuresis. No acute indication for iHD initiation today  - increase po water intake as able. His FWD is approx 3.9L. Would hold Isotonic fluids today. If his PO intake of free water remains relatively minimal, I would recommend about 1.5L of D5W throughout the day today to supplement (can be given as intermittent boluses or as a continuous drip throughout the day). This will replace half of his free water deficit. The other half can be replaced tomorrow.   - will monitor closely for further recovery     3. HTN - Borderline control in HALEY. Bp 150-160's. No edema  Current regimen: Amlodipine 5 mg every day    - Avoid hypotension. Reasonable to increase Amlodipine to 10mg qday   - continue holding diuretic     4. MRSA bacteremia- Will be completing 6 wks  "of Vancomycin and ceftazidime 8/1/23. Then start po cipro and doxycycline on 8/2 for 6 weeks..    - UC with VRE but only 10-50 k. No treatment required   - Per ID     5. Electrolytes   - Given recent hypernatremia continue to encourage fluid intake     6 Acid base - bicarb improving at 22 (7/28/2023)  - will monitor closely     7. BMD - Ca 8.7 Phos 4.7, improved from 5.3, albumin 3.2   - Malnutrition present: Getting MVI. Would benefit from dietary supplement   - No need for Phos binder at this time   - Vit D 16   - Began D3 - 50 mcq every day 7/18   - Switched to normal diet 7/26     8. Anemia - Hgb 8.3 on 7/28   - Etiology acute illness   - On Iron, folic acid   - Iron studies 6/26/23: Ferritin 2616, Fe 30 IS 21   - Does have DVT history on Eliquis - switched to heparin   - Ferritin too high for IV iron   - Transfusions per primary team    Carlos Enrique Wilson MD   Division of Renal Disease and Hypertension  [facility]  [email or web address]  Vocera Web Console    Interval History :   Nursing and provider notes from last 24 hours reviewed.  Now more persistently HTN  Creatinine now consistently trending down  Na up to 148  I's and O's not accurate. Voiding relatively frequently per bedside nurse but confused and incontinent so measuring volume is very challenging    Review of Systems:   As noted above in interval history    Physical Exam:   I/O last 3 completed shifts:  In: 780 [P.O.:760; I.V.:20]  Out: 150 [Urine:150]   BP (!) 166/69 (BP Location: Left arm)   Pulse 82   Temp 98.1  F (36.7  C) (Oral)   Resp 20   Ht 1.778 m (5' 10\")   Wt 134.4 kg (296 lb 6.4 oz)   SpO2 96%   BMI 42.53 kg/m       General : Pt resting, no acute distress   Lungs : no respiratory distress  Cardiac : RRR  LE :trace Edema noted  Dialysis Access : N/A    Labs:   All labs reviewed by me  Electrolytes/Renal -   Recent Labs   Lab Test 07/30/23  0811 07/30/23  0154 07/29/23  2145 07/29/23  1743 07/29/23  1250 07/28/23  1734 07/28/23  1025 07/12/23  1209 " 07/12/23  0911 07/05/23  0823 07/05/23  0700 07/02/23  1754 07/02/23  1149   *  --   --   --  146*  --  146*   < > 143   < > 142   < > 137   POTASSIUM 3.6  --   --   --  3.6  --  3.8   < > 4.1   < > 4.5   < > 4.3   CHLORIDE 112*  --   --   --  109*  --  109*   < > 112*   < > 108*   < > 107   CO2 24  --   --   --  23  --  22   < > 19*   < > 16*   < > 21*   BUN 50.4*  --   --   --  51.9*  --  55.1*   < > 43.6*   < > 46.3*   < > 39.1*   CR 4.66*  --   --   --  4.86*  --  5.35*   < > 4.44*   < > 3.30*   < > 2.20*   GLC 91 114* 126*   < > 97   < > 111*   < > 105*   < > 99   < > 128*   MAIKOL 8.3*  --   --   --  8.4*  --  8.7*   < > 8.3*   < > 7.7*   < > 7.9*   MAG  --   --   --   --   --   --   --   --  1.6*  --  1.7  --  1.7   PHOS 4.8*  --   --   --  5.1*  --  4.7*   < > 5.0*   < >  --   --   --     < > = values in this interval not displayed.       CBC -   Recent Labs   Lab Test 07/30/23 0811 07/28/23  1025 07/26/23  0805   WBC 8.3 9.9 9.5   HGB 7.6* 8.3* 7.8*    311 282       LFTs -   Recent Labs   Lab Test 07/30/23  0811 07/29/23  1250 07/28/23  1025 07/06/23  1708 07/06/23  0713 07/04/23  0627 06/22/23  0711 06/20/23  0704   ALKPHOS  --   --   --   --  103 99  --  93   BILITOTAL  --   --   --   --  <0.2 0.2  --  0.2   ALT  --   --   --   --  12 14  --  14   AST  --   --   --   --  9 11  --  14   PROTTOTAL  --   --   --   --  5.3* 5.9*  --  5.9*   ALBUMIN 3.1* 3.2* 3.2*   < > 2.4*  2.4* 2.6*   < > 2.3*    < > = values in this interval not displayed.       Iron Panel -   Recent Labs   Lab Test 06/26/23  0513   IRON 30*   IRONSAT 21   FADI 2,616*       Current Medications:   acetaminophen  975 mg Oral Q8H    amLODIPine  5 mg Oral Daily    [Held by provider] apixaban ANTICOAGULANT  2.5 mg Oral BID    atorvastatin  40 mg Oral Daily    cefTAZidime  2 g Intravenous Q24H    [START ON 8/2/2023] ciprofloxacin  500 mg Oral Q24H LUIS A    [START ON 8/2/2023] doxycycline hyclate  100 mg Oral Q12H LUIS A (08/20)    ferrous  sulfate  325 mg Oral Every Other Day    folic acid  1 mg Oral Daily    heparin ANTICOAGULANT  5,000 Units Subcutaneous Q12H    heparin lock flush  5-20 mL Intracatheter Q24H    insulin aspart  1-7 Units Subcutaneous TID AC    insulin aspart  1-5 Units Subcutaneous At Bedtime    lactobacillus rhamnosus (GG)  1 capsule Oral BID    miconazole   Topical BID    multivitamin w/minerals  1 tablet Oral Daily    senna-docusate  1 tablet Oral BID    sodium chloride (PF)  10-40 mL Intracatheter Q8H    vancomycin place montilla - receiving intermittent dosing  1 each Intravenous See Admin Instructions    cholecalciferol  50 mcg Oral Daily       Carlos Enrique Wilson MD

## 2023-07-30 NOTE — PROGRESS NOTES
"VS:     BP (!) 167/77 (BP Location: Left arm)   Pulse 77   Temp 97.9  F (36.6  C) (Oral)   Resp 16   Ht 1.778 m (5' 10\")   Wt 134.4 kg (296 lb 6.4 oz)   SpO2 94%   BMI 42.53 kg/m          O2: SpO2 >90% and stable on RA. LS clear and equal bilaterally. Denies chest pain and SOB.    Output: Minimally uses urinal and is incontinent of bladder this shift   Last BM: 7/29, denies abdominal discomfort. BS active / passing flatus.    Activity: Assist of 2 turning and repositioning in bed. No resistance this shift and able to get a good bed bath completed.   Skin:  Redness in groins, unable to complete full skin assessment. Not wanting his LLE touched.   Pain: Pain managed with tylenol.   CMS: Intact, is alert and oriented to self, disoriented time, place, and situation.  Denies numbness and tingling.   Dressing: To right chest CL   Diet: Regular diet. Ordered a meal for him and he ate 100%   LDA: Right chest wall single lumen PICC  Patent.   Equipment: IV pole, personal belongings,    Plan: Contact precautions maintained.  Call light within reach, pt able to make needs known. Continue with plan of care.   Additional Info:  MD to discuss with nephrology Monday again about revisiting the need for dialysis. Gentle fluids started.     "

## 2023-07-30 NOTE — PROGRESS NOTES
Cleaned Don from head to toe bath except would not let me touch his LLE to clean or assess skin. He asks if we are on a bus or in a car. He thinks we are at [facility]. Reoriented to the hospital. He is seeing people who are not in the room. I indicated it was only him and I. He did need to use the urinal and did that mostly unassisted. He indicated that this is definitely his last day, but did not elaborate of what. Falls asleep easily.

## 2023-07-31 NOTE — PROGRESS NOTES
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  Postop course complicated by profound deconditioning.  He was admitted to TCU initially on 12/23/2022 for physical rehabilitation, however therapies no longer worked with patient after several months of an in bed therapy and his refusals to mobilize.  Underwent biopsy on 4/7/2023 without evidence of infection.  He was transferred back to [facility] for left total hip arthroplasty and reimplantation on 5/26/23 with Dr. Meyers.  Presented again to [place] for persistent MRSA bacteremia on 6/17. His course has been c/b HALEY. Baseline creat 1.0 as recent as 6/19/23).         Likely non oliguric HALEY stage II  Recurrent HALEY in setting of significant hemodynamic changes including diuretics and Vancomycin use   Since his admission, have had multiple HALEY episodes. Again developed HALEY in July with creatinine peak of 6.05 on 7/25 and started to improve to 4.22 today with eGFR of 14 ml. He remained altered but awake. Not able to answer orientation questions. Per primary team, his upper extremity myoclonic jerks been worsening/ not improving with improvement in his renal function/ BUN levels. Neurology was suspecting uremic syndrome when he had HALEY, seems like not been involved as his renal function been improving. Was not able to do imaging to rule out intracranial pathology as he was not able to hold still for the imaging. At that time cefepime induced neurotoxicity and or thiamine def were differentials as well along with uremia.   - continue holding diuresis. No acute indication for iHD initiation today  -  with given trajectory, hoping for continued renal function improvement but unclear if that would improve his mentation.    Discussed in length with primary team Dr. Ring and Brother Zeferino at bed side about pros and cons of dialysis including dialysis dependency for Rahat's life. Hope is that dialysis will improve Rahat's mentation (provided his AMS is 2/2 uremia) and help to understand what his goals of care are. Discussed that, will consider dialysis catheter placement tomorrow and can re- group on 8/1 to discuss about dialysis initiation based on his labs.       3. HTN - Borderline control in HALEY. Bp 150-160's. No edema  Current regimen: Amlodipine 10 mg daily    - continue holding diuretic     4. MRSA bacteremia- Will be completing 6 wks of Vancomycin and ceftazidime 8/1/23. Then start po cipro and doxycycline on 8/2 for 6 weeks..    - UC with VRE but only 10-50 k. No treatment required   - Per ID     5. Electrolytes  Hypernatremia, improved with D5 water     6 Acid base - bicarb improving at 24 (7/28/2023)  - will monitor closely     7. BMD - Ca 8.7 Phos 4.7, improved from 5.3, albumin 3.2   - Malnutrition present: Getting MVI. Would benefit from dietary supplement   - No need for Phos binder at this time   - Vit D 16   - Began D3 - 50 mcq every day 7/18   - Switched to normal diet 7/26     8. Anemia - Hgb 8.3 on 7/28   - Etiology acute illness   - On Iron, folic acid   - Iron studies 6/26/23: Ferritin 2616, Fe 30 IS 21   - Does have DVT history on Eliquis - switched to heparin   - Ferritin too high for IV iron   - Transfusions per primary team    Recommendations were communicated to primary team via this note    Zayda Lucero MD   Division of Renal Disease and Hypertension  [facility]  [email or web address]  Vocera Web Console    Interval History :   Nursing and provider notes from last 24 hours reviewed.  In the last 24 hours Waldo Bustos been hemodynamically stable. C/o right hip and left middle quadrant pain. Pt's  "brother Ivan at bed side.   Review of Systems:   Was not able to review because of change in mentation.    Physical Exam:   No intake/output data recorded.   BP (!) 177/84 (BP Location: Left arm)   Pulse 80   Temp 97.7  F (36.5  C) (Oral)   Resp 16   Ht 1.778 m (5' 10\")   Wt 134.4 kg (296 lb 6.4 oz)   SpO2 95%   BMI 42.53 kg/m       General : Pt awake, not in acute distress   Lungs : anterior lung fields are clear  Cardiac : S1, S2 present  Abdomen : Soft/ND/NT  LE : no Edema noted  Dialysis Access : n/a    Labs:   All labs reviewed by me  Electrolytes/Renal -   Recent Labs   Lab Test 07/31/23  0736 07/31/23  0218 07/30/23  2156 07/30/23 2014 07/30/23  0811 07/29/23  1743 07/29/23  1250 07/12/23  1209 07/12/23  0911 07/05/23  0823 07/05/23  0700 07/02/23  1754 07/02/23  1149     --   --  145 148*  --  146*   < > 143   < > 142   < > 137   POTASSIUM 3.2*  --   --   --  3.6  --  3.6   < > 4.1   < > 4.5   < > 4.3   CHLORIDE 109*  --   --   --  112*  --  109*   < > 112*   < > 108*   < > 107   CO2 24  --   --   --  24  --  23   < > 19*   < > 16*   < > 21*   BUN 47.2*  --   --   --  50.4*  --  51.9*   < > 43.6*   < > 46.3*   < > 39.1*   CR 4.22*  --   --   --  4.66*  --  4.86*   < > 4.44*   < > 3.30*   < > 2.20*   * 125* 112*  --  91   < > 97   < > 105*   < > 99   < > 128*   MAIKOL 8.5*  --   --   --  8.3*  --  8.4*   < > 8.3*   < > 7.7*   < > 7.9*   MAG  --   --   --   --   --   --   --   --  1.6*  --  1.7  --  1.7   PHOS 4.4  --   --   --  4.8*  --  5.1*   < > 5.0*   < >  --   --   --     < > = values in this interval not displayed.       CBC -   Recent Labs   Lab Test 07/30/23  0811 07/28/23  1025 07/26/23  0805   WBC 8.3 9.9 9.5   HGB 7.6* 8.3* 7.8*    311 282       LFTs -   Recent Labs   Lab Test 07/31/23  0736 07/30/23  0811 07/29/23  1250 07/06/23  1708 07/06/23  0713 07/04/23  0627 06/22/23  0711 06/20/23  0704   ALKPHOS  --   --   --   --  103 99  --  93   BILITOTAL  --   --   --   --  " <0.2 0.2  --  0.2   ALT  --   --   --   --  12 14  --  14   AST  --   --   --   --  9 11  --  14   PROTTOTAL  --   --   --   --  5.3* 5.9*  --  5.9*   ALBUMIN 3.2* 3.1* 3.2*   < > 2.4*  2.4* 2.6*   < > 2.3*    < > = values in this interval not displayed.       Iron Panel -   Recent Labs   Lab Test 06/26/23  0513   IRON 30*   IRONSAT 21   FADI 2,616*       Current Medications:   acetaminophen  975 mg Oral Q8H    amLODIPine  10 mg Oral Daily    [Held by provider] apixaban ANTICOAGULANT  2.5 mg Oral BID    atorvastatin  40 mg Oral Daily    cefTAZidime  2 g Intravenous Q24H    [START ON 8/2/2023] ciprofloxacin  500 mg Oral Q24H LUIS A    [START ON 8/2/2023] doxycycline hyclate  100 mg Oral Q12H [facility] (08/20)    ferrous sulfate  325 mg Oral Every Other Day    folic acid  1 mg Oral Daily    [Held by provider] heparin ANTICOAGULANT  5,000 Units Subcutaneous Q12H    heparin lock flush  5-20 mL Intracatheter Q24H    insulin aspart  1-7 Units Subcutaneous TID AC    insulin aspart  1-5 Units Subcutaneous At Bedtime    lactobacillus rhamnosus (GG)  1 capsule Oral BID    miconazole   Topical BID    multivitamin w/minerals  1 tablet Oral Daily    senna-docusate  1 tablet Oral BID    sodium chloride (PF)  10-40 mL Intracatheter Q8H    vancomycin place montilla - receiving intermittent dosing  1 each Intravenous See Admin Instructions    cholecalciferol  50 mcg Oral Daily      D5W 75 mL/hr at 07/31/23 0425    sodium chloride 0.9%       Zayda Lucero MD

## 2023-07-31 NOTE — PROGRESS NOTES
Pt A & O with some confusion regarding time. Heavy total care in bed and needs assist of 2 to T & R and to change and clean him. Incontinent B & B and had large loose stool. Pt c/o right hip pain especially with cleaning and turning. Takes scheduled Tylenol. Coccyx reddened and excoriated and barrier cream used.  Miconazole powder to groin. Pt pleasant, knows how to use the call-light but has not used yet. D5 infusing at 75 ml/hr. Nursing is monitoring and following POC    .Margot Sequeira RN

## 2023-07-31 NOTE — CONSULTS
"    Interventional Radiology  [facility] Consult Service Note  07/31/23   10:50 AM    Consult Requested: CVC placement.    Recommendations/Plan:    Patient will be added to IR schedule on 8/2/23 for a tunneled CVC placement.     This is a 72 year old male with history of renal failure. Patient's team reports that Nephrology requests tunneled CVC for hemodialysis. Procedure is non-urgent, and can be done early this week. Patient has a right internal jugular tunneled CVC in place.    Labs WNL for procedure.   Preprocedural orders entered, as well as orders for procedure, NPO status, and pre procedure IV antibiotics.     Consent will be done prior to procedure.     Please contact the IR charge RN at [phone] for estimated time of procedure.     Recommendations were reviewed with requesting team.        Pertinent Imaging Reviewed: Tunneled CVC placement (6/27/23)    Expected date of discharge:  TBD.    Vitals:   BP (!) 177/84 (BP Location: Left arm)   Pulse 80   Temp 97.7  F (36.5  C) (Oral)   Resp 16   Ht 1.778 m (5' 10\")   Wt 134.4 kg (296 lb 6.4 oz)   SpO2 95%   BMI 42.53 kg/m      Pertinent Labs:   Lab Results   Component Value Date    WBC 8.3 07/30/2023    WBC 9.9 07/28/2023    WBC 9.5 07/26/2023     Lab Results   Component Value Date    HGB 7.6 07/30/2023    HGB 8.3 07/28/2023    HGB 7.8 07/26/2023     Lab Results   Component Value Date     07/30/2023     07/28/2023     07/26/2023     No results found for: INR, PTT  Lab Results   Component Value Date    POTASSIUM 3.2 (L) 07/31/2023    POTASSIUM 4.2 02/06/2023        COVID-19 Antibody Results, Testing for Immunity         No data to display            COVID-19 PCR Results        4/2/2022    01:16 4/3/2022    09:22 4/6/2022    06:34 11/18/2022    13:09 12/23/2022    13:38   COVID-19 PCR Results   COVID-19 Virus by PCR (External Result) Negative     Negative     Negative         SARS CoV2 PCR    Negative  Negative  "           12/27/2022    14:30 6/17/2023    08:36   COVID-19 PCR Results   COVID-19 Virus by PCR (External Result)     SARS CoV2 PCR Negative  Negative     Details          This result is from an external source.             Bartolome Cedeno PA-C  Interventional Radiology  Pager: [phone]

## 2023-07-31 NOTE — PROGRESS NOTES
"VS:     BP (!) 177/84 (BP Location: Left arm)   Pulse 80   Temp 97.7  F (36.5  C) (Oral)   Resp 16   Ht 1.778 m (5' 10\")   Wt 134.4 kg (296 lb 6.4 oz)   SpO2 95%   BMI 42.53 kg/m             O2: SpO2 >90% and stable on RA. LS clear and equal bilaterally. Denies chest pain and SOB.    Output: Minimally uses urinal and is incontinent of bladder this shift   Last BM: 7/31, denies abdominal discomfort. BS active / passing flatus.    Activity: Assist of 2 turning and repositioning in bed. No resistance this shift and able to get a good bed bath completed.   Skin:  Redness in groins, unable to complete full skin assessment. Not wanting his LLE touched.   Pain: Pain managed with tylenol.   CMS: Intact, is alert and oriented to self, disoriented time, place, and situation.  Denies numbness and tingling.   Dressing: To right chest CL   Diet: Regular diet. Ordered a meal for him and he ate 50%   LDA: Right chest wall single lumen PICC  Patent.   Equipment: IV pole, personal belongings,    Plan: Contact precautions maintained.  Call light within reach, pt able to make needs known. Continue with plan of care.   Additional Info:  NPO after MN for dialysis line placement Monday. Tonic clonic jerking is getting progressively worse the past week. Dropping things and needing help being fed.     "

## 2023-07-31 NOTE — PROGRESS NOTES
"Orthopedic Surgery Progress Note: 07/31/2023    Subjective:   LAURENEON. Reviewed RN and consultant notes. Considering HD today. Rahat reports his weekend was not good (Thumbs down).     Objective:   /60 (BP Location: Left arm)   Pulse 82   Temp 97.2  F (36.2  C) (Oral)   Resp 16   Ht 1.778 m (5' 10\")   Wt 134.4 kg (296 lb 6.4 oz)   SpO2 96%   BMI 42.53 kg/m    No intake/output data recorded.  General: NAD. Resting comfortably in bed.  Respiratory: Breathing comfortably on RA.  Musculoskeletal: Focused exam of the left lower extremity: incision healing well. Rook boot in place.    Laboratory Data:  Lab Results   Component Value Date    WBC 8.3 07/30/2023    HGB 7.6 (L) 07/30/2023     07/30/2023      Intraoperative cultures   6/21/2023: MRSA, C Acnes, Pseudomonas    Assessment & Plan:   Waldo Bustos is a 72 year old male status post left revision total hip arthroplasty on 5/26/2023 with Dr. Meyers now presenting with left hip prosthetic joint infection status post left hip irrigation and debridement on 6/21/2023 with Dr. Meyers.    Postoperatively noted to have confusion and increasing creatinine, sodium. Thorough workup by primary, Neuro, renal suggests metabolic encephalopathy. Possible dialysis this week, per renal.    Anemia postoperatively. Intermittent transfusions while hospitalized. Good response.    CRP downtrending 40> 30> 18. C acnes, MRSA in cultures. Treating with vanc/cefepime, per ID recs. Appreciate assistance.    Ortho will continue to follow every few days. Please do not hesitate to page with questions/concerns.     Ortho Plan:  - Activity: Up with assist until independent. Posterior hip precautions x 3 months (no flexion beyond 90 degrees, no adduction beyond midline, and no internal rotation beyond midline).  - Weightbearing Status: WBAT LLE.  - Pain Management: Transition from IV to PO as tolerated.  - Antibiotics: Vancomycin and ceftazidime, per ID/primary.  - Diet: OK for a " diet from an orthopedic perspective.  - DVT Prophylaxis: SCDs and PTA apixaban okay from an ortho perspective  - Imaging: XR hip to be obtained in mid-late August  - Labs: Labs PRN and per primary  - Bracing/Splinting: Abduction pillow or regular pillow while in bed.  - Dressings: Dressing removed. Okay to leave incision to air.  - Drains: GUERRERO drain removed 7/3  - Physical Therapy/Occupational Therapy: Evaluation and treatment. Signed off week of 7/12 due to patient lack of participation.  - Cultures: Intraoperative cultures 6/21/2023.  - Follow-up: Clinic with Dr. Meyers's team TBD (pending dispo plan). Will plan to get XR at that time.    - Disposition: TCU. Okay to discharge from an ortho perspective.     Hannah Starr MD, PGY-4  Orthopedics  Pager: [phone]

## 2023-07-31 NOTE — PROGRESS NOTES
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  Postop course complicated by profound deconditioning.  He was admitted to TCU initially on 12/23/2022 for physical rehabilitation, however there was no progress with therapies after several months of in bed therapy and his refusals to mobilize. Underwent biopsy on 4/7/2023 without evidence of infection.  He was transferred back to [facility] for left total hip arthroplasty and reimplantation on 5/26/23 with Dr. Meyers.  Presented again to [facility] for persistent MRSA bacteremia on 6/17. Pt having ongoing encephalopathy and clearly lacks capacity, current though is 2/2 renal failure and uremia. Nephro following, planning to initiate HD this week.     Brief Hospital Summary:    Pt was admitted to continue infectious workup with MRSA bacteremia.  Admission blood cultures were positive, ID and Ortho were consulted. KEILA was obtained on 6/19, which was negative for endocarditis, ortho took for washout/debridement of hip on 6/21. ID recommending 6 weeks IV abx (6/21-8/1) via tunneled internal jugular line (nephrology asked to defer PICC) followed by 6 weeks of PO cipro/doxy. He has developed persistent encephalopathy that is felt to be multifactorial. Neurology consulted and there was concern for contribution of cefepime neurotoxicity. EEG was negative for seizures and CT head negative. He did have some mild improvement after stopping cefepime, but has not returned to baseline. Complicating the situation further  is that he has developed worsening acute renal failure with now concern for uremia contributing to his mental status. Care conference on 7/21 with brother Ivan as surrogate decision maker, planned for short trial of dialysis to determine if pt has any meaningful improvement.    Today:  - Follow up nephro recs:  >Spoke to nephro staff. Pt exam and mental status worsening, would really push for HD initiation. Nephro agrees to initiation of HD this week, can move forward with tunneled line placement  -IR re-consulted, okay to place line  -NPO midnight, pre-op labs   -Follow up hypernatremia and free water plan PRN  >Encourage PO intake  - Cont IV vanc and ceftazidime through 8/1 then switch to PO doxy + cipro through 9/13  - Cont subQ heparin in place of DOAC in case needs AC held for line + HD initiation in coming days     Goals of Care  Complex medical course over the past 6+ months. Was in [facility] since 12/2022 due to profound deconditioning, but ultimately refused to participate meaningfully in therapies. Was readmitted in 6/23 with MRSA bacteremia after repeat hip surgery in 4/23. His hospital course has been complicated by acute renal failure, multifactorial encephalopathy and apathy/refusal to participate in therapies or medical cares. He was assessed by psychiatry on 7/18 and felt to be non-decisional regarding complex medical decisions or disposition. His brother (Ivan) is his legal POA, but Don does not have a healthcare directive. Care conference held on 7/21 with medicine, palliative, renal, ZEB, RN manager - discussed the above and options including a short trial (~2 weeks) of dialysis to see if it improves his mental function and willingness/ability to participate in therapies vs moving towards a comfort-based approach. Ultimately, Ivan decided to move forward with the trial of dialysis and a plan to reassess in ~2 weeks to determine next steps. We discussed that if he does not meaningfully  improve, outpatient dialysis would likely not be an option due to his profound immobility.  - Initially planned to move forward with short trial of dialysis, this was then held as nephro thought he was pending renal recovery. Unfortunately despite his Cr improvement, BUN remains high and his jerking movements/mental status are worsening.   >Planning for HD initiation this week, per nephro, even if the uremia was causing his altered mental status and was cleared with HD, would not expect to see improvement for many days  - Continue full code for now. Ivan to discuss more with his brothers.    Multifactorial encephalopathy - likely toxic metabolic, uremic, delirium, depression contributing  Noted to have acutely worsening mental status around ~7/4, which was initially felt to be related to cefepime neurotoxicity. Neurology consulted. EEG without seizures. CT head, ammonia and VBG wnl. Completed 5 days of antibiotics. No clinical or objective findings concerning for worsening infection. Does have worsening renal failure with rising BUN, so some consideration that uremia could be contributing, but overall not hugely elevated. Also concern that depression is contributing.  - Delirium precautions  - Nephrology following  - After care conference on 7/21, planned for a short trial of inpatient dialysis. Course explained above.   - Psychiatry consulted on 7/18  for capacity assessment - pt not decisional regarding complex medical decisions or disposition.  -Will consider re-consulting neuro and consider MRI in coming days, however pt would likely need significant sedation for this (such as general anesthesia) given his mental status and agitation thus holding off on this for now    MRSA bacteremia due to possible left hip septic arthritis vs other source Blood cultures: 6/14, 6/15 + for MRSA (4/4), and 6/16 (1/2 GPCs) and 6/17 (2/2) GPCs. - started vancomycin 6/15.   6/17 sinovial fluid cultures with staph and klebsiella.  Purulent drainage noted at incision site at TCU, CT left hip concerning for infection (no mention of joint, mostly anterior in soft tissue) and couldn't exclude abscess/inf  - ID intermittently following through hospital course. Last seen 7/21.  - Continue ceftazidime, vancomycin for C.acnes, Pseudomonas, MRSA PJI until 8/1/2023  - Start oral doxycycline and ciprofloxacin on 8/2/2023 through 9/13 (total 12 weeks therapy)  - ID follow up ~8/21 (can be inpatient team if remains inpatient or can be outpatient)     Acute Kidney Injury / Acute renal failure - multifactorial, see above/below  Nephrology consulted, appreciate recs. Baseline creatinine 1.0 or 1.1. Etiology: suspect combo of sepsis/inflammation, hypovolemia, blood loss anemia following surgery, vancomycin. UA recollected on 7/19 with granular casts suggesting ongoing dense ATN. Creatinine continuing to uptrend, however no acidosis, electrolyte abnormalities or evidence of hypervolemia. Some concern that uremia could be contributing to his encephalopathy. After GOC on 7/21, planned to pursue short trial of dialysis.  - Nephrology consulted  - Initially planned for short trial of HD however nephro holding off on line placement and HD as they think pt has impending renal recovery.   - See above for GOC converation.  Lab Results   Component Value Date    CR 4.22 (H) 07/31/2023    CR 4.66 (H) 07/30/2023    CR 4.86 (H) 07/29/2023    CR 5.35 (H) 07/28/2023    CR 5.75 (H) 07/27/2023       Asymptomatic bacteruria (VRE)  UA obtained on 7/19 to assess for casts, reflexed to culture which is growing VRE. Labs and vitals with low concern for new infection. Discussed with ID who recommended against treatment.    Bilateral LE edema due to volume overload from HALEY and iatrogenic from blood transfusions  Hyperesthesia of bilateral lower extremities (no signs of cellulitis as of 7/13)  - Holding diuresis starting 7/20    L heel pressure wound, unstagable  Noted earlier in pts  hospitalization (~6/20), but at that time he refused assessment. Noted again by nursing to be worsening on 7/19 and WOCN consult was placed. Pt was agreeable and had wound assessed. He has been mostly non-agreeable to offloading or other preventative measures recommended by RN and WOCN, but will sometimes allow it with significant encouragement.  - WOCN consult  - Continue to encourage pt to utilize offloading techniques and to participate in wound cares.    Hypernatremia - resolved & recurrent   Uptrending recently, 147 on 7/18. Likely volume down. S/p D5W on 7/18, resolved.  - Monitor.  - Nephro following  - D5W for free water deficits as needed  - Encourage PO intake     Hypoxemic Respiratory Failure due to immobility, surgery - resolved  - continue to follow; encourage IS    Acute blood loss anemia following surgery, complicated by use of blood thinner  - 3 units day after surgery did joint washout (6/22) for acute blood loss anemia, 1 unit 6/25  - watch for bleeding; hold apixaban if need be - had unprovoked dvt in 2018 and would be on eliquis for life, high risk of recurrent DVT given recent procedures and illness/immobility  - iron and folic acid daily    S/p left hip explantation of left hip antibiotic spacer and revision of left total hip arthroplasty (5/26/2023)  - ortho performed washout 6/21  -Activity: 50% PWB LLE with posterior hip precautions [id number]  -AFO ordered for foot drop but patient refusing  -PT/OT  -Knee to be kept even in a foam leg elevator to relax sciatic nerve  -Pain: holding robaxin 4 times a day with mental status changes (discontinuing), APAP PRN  -stop tramadol (7/11) due to renal function  -increase oxycodone 2.5-5 mg q6h PRN- taking very minimal amts so unlikely to be contributing to mental status    Hx of unprovoked DVT in 2018   - Hold PTA apixiban pending line placement / possible HD initiation     Type 2 diabetes mellitus  - Last hemoglobin A1c 5.6% on 6/16/2023.  On  glipizide, metformin, Actos, and Victoza prior to hospitalizations and surgery.   - continue ISS    Constipation, improved  -Continue Colace 100 mg twice daily  -Continue senna 1 tablet twice daily-- had briefly increased to 2 tab twice daily due to lack of stool, now improved  -Continue MiraLAX daily     Hypomagnesemia  -monitor and replete      Severe malnutrition in the context of acute on chronic illness  Goal for patient is to consume % of meals TID. Goal is 6963-8399 kcals/daily. Protein needs- 100-126 grams/daily.   -RD consult and appreciate recs  -regular diet  -Weekly weights, daily I/O     Adjustment disorder with depressed mood  Prolonged grief disorder  Patient has been with depressed mood in setting of ongoing health issues, as evidenced by lack of motivation to work with therapies during both TCU admissions.  Past history of passive suicidal statements. Psychiatry saw patient during hospital admission on 5/31 due to lack of motivation with therapies. Patient declined interest in starting any psychiatric medications. Was willing to engage with health psychology.   -Outpatient health psychology consult  -Continue to monitor for willingness to start antidepressant therapy  -tried to discuss mood 7/15 but he didn't fully answer questions, seemed to talk about different aspect of his health that wasn't quite related to mood     HTN:   - Started amlodipine 7/16, increased to 10mg 7/30    Stable and Resolved Issues:  Hyperlipidemia-continue PTA Lipitor 40 mg daily  JAIR-CPAP when sleeping       Diet: Regular Diet Adult    DVT Prophylaxis: Heparin SQ  Tran Catheter: Not present  Lines: PRESENT      CVC Single Lumen Right Internal jugular Non - valved (open ended);Tunneled;Power injectable-Site Assessment: WDL      Cardiac Monitoring: None  Code Status: Full Code      Clinically Significant Risk Factors         # Hypernatremia: Highest Na = 148 mmol/L in last 2 days, will monitor as appropriate      #  Hypoalbuminemia: Lowest albumin = 2.2 g/dL at 7/8/2023  8:51 AM, will monitor as appropriate    # Acute Kidney Injury, unspecified: based on a >150% or 0.3 mg/dL increase in last creatinine compared to past 90 day average, will monitor renal function          # Severe Malnutrition: based on nutrition assessment         Disposition Plan           Jesse Ring DO  Hospitalist Service, GOLD TEAM 21  [facility]  Securely message with [employer] (more info)  Text page via [employer] Paging/Directory   See signed in provider for up to date coverage information  ______________________________________________________________________    Interval History   Pt today still confused. He does not know who I am. I talked to him about dialysis and he does not know what I mean or why we would do this. He was unaware of his kidney problems despite us talking about this daily.     Physical Exam   Vital Signs: Temp: 97.7  F (36.5  C) Temp src: Oral BP: (!) 177/84 Pulse: 80   Resp: 16 SpO2: 95 % O2 Device: None (Room air)    Weight: 296 lbs 6.4 oz    General Appearance:  NAD. Lying comfortably in bed.  Respiratory: CTAB. No increased WOB.  Cardiovascular: RRR.  GI: Soft. Non-tender. Non-distended.  Skin: No rash.  Other:  Moving all extremities. Normal affect. Intermittent myoclonus and jerking movements.     Medical Decision Making         Data     I have personally reviewed the following data over the past 24 hrs:    N/A  \   N/A   / N/A     145 109 (H) 47.2 (H) /  130 (H)   3.2 (L) 24 4.22 (H) \     ALT: N/A AST: N/A AP: N/A TBILI: N/A   ALB: 3.2 (L) TOT PROTEIN: N/A LIPASE: N/A       Lab Results   Component Value Date    VANCOMYCIN 21.3 07/27/2023     Lab Results   Component Value Date     (H) 07/31/2023     (H) 07/31/2023     (H) 07/30/2023

## 2023-08-01 NOTE — PROGRESS NOTES
"CLINICAL NUTRITION SERVICES - REASSESSMENT NOTE     Nutrition Prescription    RECOMMENDATIONS FOR MDs/PROVIDERS TO ORDER:  Please order all meals with pt and help to set up tray and eat as needed    Malnutrition Status:    Severe malnutrition in the context of chronic illness    Recommendations already ordered by Registered Dietitian (RD):  Encouraged oral intakes    Future/Additional Recommendations:  Monitor labs, intakes, and weight trends.    If EN becomes POC:  --Enteral access: NGT  --Pending AXR confirmation of feeding tube position  --GOAL: Osmolite 1.5 Dev (or equivalent) @ goal of  55ml/hr  (1320ml/day) provides: 1980 kcals, 83 g PRO, 1005 ml free H20, 268 g CHO, and 0 g fiber daily. + 1 pkt prosource TF 20 = 2060 kcal (24 kcal/kg) and 103 g protein (1.2 g/kg).  --Start TF @ 15 ml/hr and advance by 10 ml q 8 hrs until goal rate.  --Do not start or advance TF rate unless K+ >3.0, Mg++ > 1.5,  and Phos > 1.9.  --Minimum 30 ml q 4 hrs water flushes for tube patency.  --If gastric enteral access: HOB > 30 degrees or Reverse Trendelenburg >10-25 degrees     EVALUATION OF THE PROGRESS TOWARD GOALS   Diet: Renal, non-dialysis  Intake/Tolernace: 25 - 100% of documented meals    Pt receiving (on average) 1118 kcal and 41 g protein per day per [employer]. With documented intakes, pt is likely meeting 33- 65% minimum energy and <50% minimum protein needs.     NEW FINDINGS/REVIEW OF SYSTEMS    Nutrition/GI:   Per RN, pt had not eaten all day despite encouragement. Encouraged nurse to order for pt even when pt refuses to order himself in the event he does eat the food provided as this has been successful recently. Nurse agreeable.     RD met with pt at bedside. Offered food given pt has not eaten yet today, described possible options. Pt denied need for food stating \"'i just need to rest.\" RD reviewed importance of eating for healing, pt still not interested in ordering. Encouraged pt to order with nurse later in the " day. Pt slow to reply and falling asleep during visit and with twitches.     Weights: Pt with previous 81 lb (25%) weight loss in 6 months, now with 53 lb (22%) weight gain in last two months. Unsure of accuracy of last two weights. May be fluid related shifts vs bed scale inaccuracy.   Wt Readings from Last Encounters:   07/26/23 134.4 kg (296 lb 6.4 oz)    07/11/23 132.2 kg (291 lb 6.4 oz)   06/16/23 115.2 kg (254 lb)   05/26/23 110.4 kg (243 lb 4.8 oz)   05/19/23 110.4 kg (243 lb 4.8 oz)   03/23/23 117 kg (257 lb 14.4 oz)   01/09/23 119.7 kg (264 lb)   11/22/22 147.1 kg (324 lb 4.8 oz)   11/14/22 142.9 kg (315 lb)   11/09/22 146.8 kg (323 lb 9.6 oz)   05/19/22 136.1 kg (300 lb)   07/03/18 148.9 kg (328 lb 4.8 oz)     Skin: See [facility] nurse note 7/28     Labs reviewed   07/27/23 08:03 07/28/23 10:25 07/29/23 12:50 07/30/23 08:11 07/31/23 07:36   Sodium 144 146 (H) 146 (H) 148 (H) 145   Potassium 3.8 3.8 3.6 3.6 3.2 (L)   Urea Nitrogen 56.3 (H) 55.1 (H) 51.9 (H) 50.4 (H) 47.2 (H)   Creatinine 5.75 (H) 5.35 (H) 4.86 (H) 4.66 (H) 4.22 (H)   Phosphorus 5.3 (H) 4.7 (H) 5.1 (H) 4.8 (H) 4.4   Albumin 3.1 (L) 3.2 (L) 3.2 (L) 3.1 (L) 3.2 (L)   CRP Inflammation 17.93 (H)       Glucose 117 (H) 111 (H) 97 91 130 (H)     Medications reviewed: Ceftazidime, ferrous sulfate, folic acid, culturell, multivitamin, potassium chloride, senna, vitamin D    MALNUTRITION  % Intake: </=75% for >/= 1 month (severe)  % Weight Loss: > 10% in 6 months (severe malnutrition) - previous  Subcutaneous Fat Loss: Facial region:  moderate and Upper arm:  moderate  Muscle Loss: Thoracic region (clavicle, acromium bone, deltoid, trapezius, pectoral):  severe, and Upper arm (bicep, tricep):  severe  Fluid Accumulation/Edema: Does not meet criteria  Malnutrition Diagnosis: Severe malnutrition in the context of chronic illness    Previous Goals   Patient to consume % of nutritionally adequate meal trays TID, or the equivalent with  supplements/snacks.  Evaluation: Not met    Previous Nutrition Diagnosis  Inadequate oral intake related to poor appetite, menu fatigue, altered mental status as evidenced by pt and RN report and documented intakes.   Evaluation: No change    CURRENT NUTRITION DIAGNOSIS  Inadequate oral intake related to poor appetite, menu fatigue, altered mental status as evidenced by pt and RN report and documented intakes.     INTERVENTIONS  Implementation  Nutrition education for nutrition relationship to health/disease   Collaboration with other providers - RN    Goals  Patient to consume % of nutritionally adequate meal trays TID, or the equivalent with supplements/snacks.    Monitoring/Evaluation  Progress toward goals will be monitored and evaluated per protocol.    Dee Alanis MS, RDN, LD  RD pager: [phone]  [facility] Weekend/Holiday Pager: [phone]

## 2023-08-01 NOTE — PLAN OF CARE
Goal Outcome Evaluation:        VS: VSS   O2: SpO2 > 90 and stable on RA. LS clear and equal bilaterally. Denies chest pain and SOB.    Output: Incontinent   Last BM: 7/31   Activity: Up with A 2 with turns. Not oob   Skin: Redness blanchable sacrum/coccyx;- refused assessment    L hip incision;- ref assessment    L heel wound - ref assessment    Pain: C/o leg pain but declined pain medication. Pillows applied    CMS: Aox1. Confused, thinks he's selling stuff at the market    Dressing:    Diet: NPO after MN.   BG overnight 101   LDA: R CVC  locked   Equipment: IV pole, personal belongings,    Plan: Contact precautions maintained / Continue with plan of care.     Additional Info: New CVC NOEMI placement with IR at 1030?

## 2023-08-01 NOTE — PROGRESS NOTES
"  VS: BP (!) 134/90   Pulse 72   Temp 98.4  F (36.9  C) (Oral)   Resp 14   Ht 1.778 m (5' 10\")   Wt 134.4 kg (296 lb 6.4 oz)   SpO2 97%   BMI 42.53 kg/m      O2: RA   Output: Urine incontinence, brief in place   Last BM: 8/1, fecal incontinence   Activity: Bed bound, use ceiling lift to move   Skin: See skin assessment & wounds   Pain: Intermittent on LLE   CMS: Intact, LLE is tender   Dressing: Bilateral heels have mepilex   Diet: reg   LDA: R chest internal jugular single lumen, dialysis catheter L chest area   Equipment: Ceiling lift, hover mat   Plan: Continue to monitor   Additional Info:         Rahat declined breakfast and lunch today, this RN did order dinner    Rahat refused to have this RN look at his L heel but later in the afternoon a WOC RN came to assess and change dressing.  Rahat let us turn him and she was able to look at L heel.      Rahat refused his senna this morning    Today he had a dialysis catheter placed in IR.  "

## 2023-08-01 NOTE — PROGRESS NOTES
Call returned from Dr. Dowling regarding K+ 3.2. No new orders at this time. Will continue to monitor.

## 2023-08-01 NOTE — PROGRESS NOTES
[facility] Nurse Inpatient Assessment     Consulted for: Left heel    7/25/2023: Attempted to see previously for buttocks. Patient continues to flatly refuse assessment for buttocks today. Per bedside RN, buttocks is red but blanchable, patient continues to be incontinent of urine and stool.       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  Postop course complicated by profound deconditioning.  He was admitted to TCU initially on 12/23/2022 for physical rehabilitation, however therapies no longer worked with patient after several months of an in bed therapy and his refusals to mobilize.  Underwent biopsy on 4/7/2023 without evidence of infection.  He was transferred back to [facility] for left total hip arthroplasty and reimplantation on 5/26/23 with Dr. Meyers.  Presented again to [facility] for persistent MRSA bacteremia on 6/17.     Assessment:      Areas visualized during today's visit: Lower extremities     Pressure Injury Location: Left heel     7/19/23 7/28    Last photo: 7/28  Wound type: Pressure Injury     Pressure Injury Stage: Unstageable, hospital acquired, assessed with SANDRA Cook on 7/28  Wound history/plan of care:  Pt known to [facility] service. Pt known to decline cares and repositioning.   7/19 initial assessment: Spent approx 20 mins on negotiating how to move leg to be able to minimally assess wound. Pt agreed to see picture of wound. This writer explained wound to pt using the photo. Discussed with patient if he would like legs  elevated on pillows or boots. He agreed to pillows.    7/20: Assessed wound with Kristen Hadley RN CWOCN. Confirmed thick slough/eschar wound base. Again discussed keeping heels off bed with pt.  7/25/2023: Of note, patient has refused and continues to refuse heel off-loading boots on assessment today.  7/28: pt had heel lift boots in place  Wound base: 100 % slough/eschar     Palpation of the wound bed: boggy      Drainage: scant     Description of drainage: serosanguinous     Measurements (length x width x depth, in cm) 3 x 3 x 0.2 cm     Tunneling N/A     Undermining N/A  Periwound skin: Intact      Color: normal and consistent with surrounding tissue      Temperature: normal   Odor: none  Pain: severe, shooting and stabbing  Pain intervention prior to dressing change: slow and gentle cares   Treatment goal: Infection control/prevention and soften nonviable tissue  STATUS: evolving  Supplies ordered: discussed with RN and discussed with patient     Wound location: left buttock and gluteal cleft    Last photo: 7/28/23  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)  Wound history/plan of care: pt is incontinent and has previously refused assessment of area by [facility]  Wound base: 100 % dermis to open areas on left buttock and base of gluteal cleft     Palpation of the wound bed: normal      Drainage: scant     Description of drainage: serosanguinous     Measurements (length x width x depth, in cm): see photo      Tunneling: N/A     Undermining: N/A  Periwound skin: Intact and Erythema- blanchable      Color: red      Temperature: normal   Odor: none  Pain: moderate, sharp- mostly related to left hip pain  Pain interventions prior to dressing change: slow and gentle cares   Treatment goal: Maintain (prevention of deterioration) and Protection  STATUS: initial assessment  Supplies ordered: at bedside, discussed with RN, and discussed with patient        Wound Location: Left medial thigh    On assessment of patient  bloody mucous noted on blue pad and when cleaning buttocks no mucous noted from rectum but upon cleansing of perineal area there was an indurated area noted to left inner thigh with bloody pus-like drainage. Pointed out abscess to floor RN and left area BRII to drain.      Treatment Plan:     Bilateral buttocks and gluteal cleft wounds: BID and PRN with incontinence episodes  Cleanse wound with dry wipes and martell cleanse and protect spray.   Apply light layer of Triad paste (#806521) to wounds and red skin on bilateral buttocks  With repeat application, do not scrub the paste, only remove soiled paste and reapply.  If complete removal of paste is necessary use baby oil/mineral oil (#379812) and soft wash cloth.  Ensure pt has Isaac-cushion (#334033) while sitting up in the chair.  Use only one Covidien pad in between mattress and pt. No brief in bed.    Left heel wound(s): Every other day   Strongly encourage pt to elevate heels on pillows to float off bed surface at all times. (He agreed to this with WOC)  Cleanse wound with wound cleanser.   Apply light layer of Triad paste (#[id number]) to wound bed  Cut piece of adaptic to cover paste and wound  Secure with mepilex.  If patient agrees to heel off-loading boots, please apply.    Orders: Reviewed    RECOMMEND PRIMARY TEAM ORDER: None, at this time  Education provided: importance of repositioning, plan of care, wound progress and Infection prevention   Discussed plan of care with: Patient and Nurse  WOC nurse follow-up plan: weekly  Notify WOC if wound(s) deteriorate.  Nursing to notify the Provider(s) and re-consult the WOC Nurse if new skin concern.    DATA:     Current support surface: Bariatric Low air loss (BIN pump, Isolibrium, Pulsate, skin guard, etc)  Containment of urine/stool: Incontinence Protocol  BMI: Body mass index is 42.53 kg/m .   Active diet order: Orders Placed This Encounter      NPO per Anesthesia Guidelines for Procedure/Surgery Except for: Meds      Output: I/O last 3 completed shifts:  In: 915 [P.O.:905; I.V.:10]  Out: 350 [Urine:350]     Labs:   Recent Labs   Lab 08/01/23  1022   ALBUMIN 3.1*   HGB 8.8*   INR 1.34*   WBC 8.0       Pressure injury risk assessment:   Sensory Perception: 3-->slightly limited  Moisture: 2-->very moist  Activity: 1-->bedfast  Mobility: 2-->very limited  Nutrition: 1-->very poor  Friction and Shear: 1-->problem  Rafael Score: 10    Tresa Khanna RN CWOCN  Pager no longer is use, please contact through [employer] group: [facility] Nurse [facility]  Dept. Office Number: [phone]

## 2023-08-01 NOTE — PROCEDURES
Interventional Radiology Brief Post Procedure Note    Procedure:   Placement tunneled central venous catheter    Pre-procedure diagnosis: renal failure requiring long term hemodialysis, patient has right internal jugular tunneled springer in place    Post-procedure diagnosis:same    Proceduralist: Keena Lomeli MD    Assistant: None    Time Out: Prior to the start of the procedure and with procedural staff participation, I verbally confirmed the patient s identity using two indicators, relevant allergies, that the procedure was appropriate and matched the consent or emergent situation, and that the correct equipment/implants were available. Immediately prior to starting the procedure I conducted the Time Out with the procedural staff and re-confirmed the patient s name, procedure, and site/side. (The Joint Commission universal protocol was followed.)  Yes        Sedation: IR Nurse Monitored Care   Post Procedure Summary:  Prior to the start of the procedure and with procedural staff participation, I verbally confirmed the patient s identity using two indicators, relevant allergies, that the procedure was appropriate and matched the consent or emergent situation, and that the correct equipment/implants were available. Immediately prior to starting the procedure I conducted the Time Out with the procedural staff and re-confirmed the patient s name, procedure, and site/side. (The Joint Commission universal protocol was followed.)  Yes       Sedatives: Fentanyl    Vital signs, airway and pulse oximetry were monitored and remained stable throughout the procedure and sedation was maintained until the procedure was complete.  The patient was monitored by staff until sedation discharge criteria were met.    Patient tolerance: Patient tolerated the procedure well with no immediate complications.    Time of sedation in minutes:  20  minutes from beginning to end of physician one to one monitoring.    Findings: Left  internal jugular 14.5 Fr x 27 cm DL HD catheter, tip in RA    Estimated Blood Loss: Minimal    Fluoroscopy Time:  minute(s)    SPECIMENS: None    Complications: 1. None     Condition: Stable    Plan:   Line well functioning, heplocked and ready to use  2. Patient arrived and VS monitoring showed irregular rate prior to procedure suspicious for Afib. Rec 12 Lead EKG, which was discussed with Dr. Jones pager [phone]     Comments: See dictated procedure note for full details.    Keena Lomeli MD
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
## 2023-08-01 NOTE — PROGRESS NOTES
Interventional Radiology Intra-procedural Nursing Note    Patient Name: Waldo Bustos  Medical Record Number: [id number]  Today's Date: August 1, 2023    Start Time: 1229  End of procedure time: 1249  Procedure: placement of tunneled central venous catheter for dialysis  Report given to: MARIBEL Gtz  Time pt departs:  1315    Other Notes:   Pt was in Afib when connected to the cardiac monitor prior to the start of the procedure. He remained in this rhythm and was stable throughout the procedure. Pt was given Ancef 2g and Fentanyl 50mcg. Report given to 5 Ortho RN. Transferred back to room by transport services.       Diana Vides RN

## 2023-08-01 NOTE — PROGRESS NOTES
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  Postop course complicated by profound deconditioning.  He was admitted to TCU initially on 12/23/2022 for physical rehabilitation, however there was no progress with therapies after several months of in bed therapy and his refusals to mobilize. Underwent biopsy on 4/7/2023 without evidence of infection.  He was transferred back to [facility] for left total hip arthroplasty and reimplantation on 5/26/23 with Dr. Meyers.  Presented again to [place] for persistent MRSA bacteremia on 6/17. Pt having ongoing encephalopathy and clearly lacks capacity, current though is 2/2 renal failure and uremia. Nephro following, planning to initiate HD this week.     Brief Hospital Summary:    Pt was admitted to continue infectious workup with MRSA bacteremia.  Admission blood cultures were positive, ID and Ortho were consulted. KEILA was obtained on 6/19, which was negative for endocarditis, ortho took for washout/debridement of hip on 6/21. ID recommending 6 weeks IV abx (6/21-8/1) via tunneled internal jugular line (nephrology asked to defer PICC) followed by 6 weeks of PO cipro/doxy. He has developed persistent encephalopathy that is felt to be multifactorial. Neurology consulted and there was concern for contribution of cefepime neurotoxicity. EEG was negative for seizures and CT head negative. He did have some mild improvement after stopping cefepime, but has not returned to baseline. Complicating the situation further  is that he has developed worsening acute renal failure with now concern for uremia contributing to his mental status. Care conference on 7/21 with brother Ivan as surrogate decision maker, planned for short trial of dialysis to determine if pt has any meaningful improvement, however, since that time renal function (Cr, BUN, lytes) have slowly improved and patient has continued to make urine, stable lytes, so HD has been deferred.     Today:  - Tunneled R. Internal jugular placed by IR today 8/1  - Discussed with nephrology -> will continue to monitor, HD continues to be deferred for now  - Na 147 this am -> D5W at 75mL restarted, will recheck Na at 2000  - Cont IV vanc and ceftazidime through 8/1 then switch to PO doxy + cipro on 8/2 through 9/13  - Will plan to resume DOAC in am 8/2 (currently on heparin)     Goals of Care  Complex medical course over the past 6+ months. Was in [facility] since 12/2022 due to profound deconditioning, but ultimately refused to participate meaningfully in therapies. Was readmitted in 6/23 with MRSA bacteremia after repeat hip surgery in 4/23. His hospital course has been complicated by acute renal failure, multifactorial encephalopathy and apathy/refusal to participate in therapies or medical cares. He was assessed by psychiatry on 7/18 and felt to be non-decisional regarding complex medical decisions or disposition. His brother (Ivan) is his legal POA, but Don does not have a healthcare directive. Care conference held on 7/21 with medicine, palliative, renal, SW, RN manager - discussed the above and options including a short trial (~2 weeks) of dialysis to see if it improves his mental function and willingness/ability to participate in therapies vs moving towards a comfort-based approach. Ultimately, Ivan decided to move forward with the trial of dialysis and a plan to reassess in ~2 weeks to determine next steps. We discussed that if he does not meaningfully improve,  outpatient dialysis would likely not be an option due to his profound immobility.  - Initially planned to move forward with short trial of dialysis, this was then held as nephro thought he was pending renal recovery.   - Tunneled HD line placed 8/1, will not yet start HD, continue to monitor per nephrology   - Possibly planning for HD this week, but even if the uremia was causing his altered mental status and was cleared with HD, would not expect to see improvement for many days  - Continue full code for now. Ivan to discuss more with his brothers.    Multifactorial encephalopathy - likely toxic metabolic, uremic, delirium, depression contributing  Noted to have acutely worsening mental status around ~7/4, which was initially felt to be related to cefepime neurotoxicity. Neurology consulted. EEG without seizures. CT head, ammonia and VBG wnl. Completed 5 days of antibiotics. No clinical or objective findings concerning for worsening infection. Did have worsening renal function and uremia, and so without clear other etiology discussion was that needed to rule out uremia definitively and agreed to pursue HD for toxin clearance and re-assessment of mentation. See [facility] discussion above. Also concern that depression is contributing.  - Delirium precautions  - Nephrology following  - Psychiatry consulted on 7/18  for capacity assessment - pt not decisional regarding complex medical decisions or disposition.  - HD line placed 8/1, will continue assessing need for HD daily   -Will consider re-consulting neuro and consider MRI in coming days, however pt would likely need significant sedation for this (such as general anesthesia) given his mental status and agitation thus holding off on this for now    MRSA bacteremia due to possible left hip septic arthritis vs other source   Blood cultures: 6/14, 6/15 + for MRSA (4/4), and 6/16 (1/2 GPCs) and 6/17 (2/2) GPCs. - started vancomycin 6/15.   6/17 sinovial fluid cultures with  staph and klebsiella. Purulent drainage noted at incision site at TCU, CT left hip concerning for infection (no mention of joint, mostly anterior in soft tissue) and couldn't exclude abscess/inf  - ID intermittently following through hospital course. Last seen 7/21.  - Continue ceftazidime, vancomycin for C.acnes, Pseudomonas, MRSA PJI until 8/1/2023  - Start oral doxycycline and ciprofloxacin on 8/2/2023 through 9/13 (total 12 weeks therapy)  - ID follow up ~8/21 (can be inpatient team if remains inpatient or can be outpatient)     Acute Kidney Injury / Acute renal failure - multifactorial, see above/below  Nephrology consulted, appreciate recs. Baseline creatinine 1.0 or 1.1. Etiology: suspect combo of sepsis/inflammation, hypovolemia, blood loss anemia following surgery, vancomycin. UA recollected on 7/19 with granular casts suggesting ongoing dense ATN. Creatinine continuing to uptrend, however no acidosis, electrolyte abnormalities or evidence of hypervolemia. Some concern that uremia could be contributing to his encephalopathy. After GOC on 7/21, planned to pursue short trial of dialysis.  - Nephrology consulted  - Initially planned for short trial of HD, has been deferred d/t renal recovery, HD line placed 8/1, continue to assess need for HD daily   - See above for GOC converation.  Lab Results   Component Value Date    CR 4.22 (H) 07/31/2023    CR 4.66 (H) 07/30/2023    CR 4.86 (H) 07/29/2023    CR 5.35 (H) 07/28/2023    CR 5.75 (H) 07/27/2023       Asymptomatic bacteruria (VRE)  UA obtained on 7/19 to assess for casts, reflexed to culture which is growing VRE. Labs and vitals with low concern for new infection. Discussed with ID who recommended against treatment.    Bilateral LE edema due to volume overload from HALEY and iatrogenic from blood transfusions  Hyperesthesia of bilateral lower extremities (no signs of cellulitis as of 7/13)  - Holding diuresis starting 7/20    L heel pressure wound,  unstagable  Noted earlier in pts hospitalization (~6/20), but at that time he refused assessment. Noted again by nursing to be worsening on 7/19 and WOCN consult was placed. Pt was agreeable and had wound assessed. He has been mostly non-agreeable to offloading or other preventative measures recommended by RN and WOCN, but will sometimes allow it with significant encouragement.  - WOCN consult  - Continue to encourage pt to utilize offloading techniques and to participate in wound cares.    Hypernatremia - resolved & recurrent   Uptrending recently, 147 on 7/18. Likely volume down. S/p D5W on 7/18, resolved. Elevated again and responded to D5W on 7/29, and again on 8/1  - Monitor.  - Nephro following  - D5W for free water deficits as needed -> resumed 75mL/hour on 8/1, will check Na at 2000  - Encourage PO intake     Hypoxemic Respiratory Failure due to immobility, surgery - resolved  - continue to follow; encourage IS    Acute blood loss anemia following surgery, complicated by use of blood thinner  - 3 units day after surgery did joint washout (6/22) for acute blood loss anemia, 1 unit 6/25  - watch for bleeding; hold apixaban if need be - had unprovoked dvt in 2018 and would be on eliquis for life, high risk of recurrent DVT given recent procedures and illness/immobility  - iron and folic acid daily    S/p left hip explantation of left hip antibiotic spacer and revision of left total hip arthroplasty (5/26/2023)  - ortho performed washout 6/21  -Activity: 50% PWB LLE with posterior hip precautions [id number]  -AFO ordered for foot drop but patient refusing  -PT/OT  -Knee to be kept even in a foam leg elevator to relax sciatic nerve  -Pain: holding robaxin 4 times a day with mental status changes (discontinuing), APAP PRN  -stop tramadol (7/11) due to renal function  -increase oxycodone 2.5-5 mg q6h PRN- taking very minimal amts so unlikely to be contributing to mental status    Hx of unprovoked DVT in 2018   - Hold  PTA apixiban pending line placement / possible HD initiation, plan to resume 8/2 am if stable      Type 2 diabetes mellitus  - Last hemoglobin A1c 5.6% on 6/16/2023.  On glipizide, metformin, Actos, and Victoza prior to hospitalizations and surgery.   - continue ISS    Constipation, improved  -Continue Colace 100 mg twice daily  -Continue senna 1 tablet twice daily-- had briefly increased to 2 tab twice daily due to lack of stool, now improved  -Continue MiraLAX daily     Hypomagnesemia  -monitor and replete      Severe malnutrition in the context of acute on chronic illness  Goal for patient is to consume % of meals TID. Goal is 2267-9877 kcals/daily. Protein needs- 100-126 grams/daily.   -RD consult and appreciate recs  -regular diet  -Weekly weights, daily I/O     Adjustment disorder with depressed mood  Prolonged grief disorder  Patient has been with depressed mood in setting of ongoing health issues, as evidenced by lack of motivation to work with therapies during both TCU admissions.  Past history of passive suicidal statements. Psychiatry saw patient during hospital admission on 5/31 due to lack of motivation with therapies. Patient declined interest in starting any psychiatric medications. Was willing to engage with health psychology.   -Outpatient health psychology consult  -Continue to monitor for willingness to start antidepressant therapy  -tried to discuss mood 7/15 but he didn't fully answer questions, seemed to talk about different aspect of his health that wasn't quite related to mood     HTN:   - Started amlodipine 7/16, increased to 10mg 7/30    Stable and Resolved Issues:  Hyperlipidemia-continue PTA Lipitor 40 mg daily  JAIR-CPAP when sleeping       Diet: NPO per Anesthesia Guidelines for Procedure/Surgery Except for: Meds    DVT Prophylaxis: Heparin SQ  Tran Catheter: Not present  Lines: PRESENT      CVC Single Lumen Right Internal jugular Non - valved (open ended);Tunneled;Power  injectable-Site Assessment: WDL      Cardiac Monitoring: None  Code Status: Full Code      Clinically Significant Risk Factors        # Hypokalemia: Lowest K = 3.2 mmol/L in last 2 days, will replace as needed       # Hypoalbuminemia: Lowest albumin = 2.2 g/dL at 7/8/2023  8:51 AM, will monitor as appropriate    # Acute Kidney Injury, unspecified: based on a >150% or 0.3 mg/dL increase in last creatinine compared to past 90 day average, will monitor renal function          # Severe Malnutrition: based on nutrition assessment         Disposition Plan           Jannie Gipson MD  Hospitalist Service, GOLD TEAM 21  [facility]  Securely message with [employer] (more info)  Text page via [employer] Paging/Directory   See signed in provider for up to date coverage information  ______________________________________________________________________    Interval History   No acute events overnight. This am remembered that he was likely getting an HD line today in , and that we were waiting to determine if he would need HD. Denied fevers, chills, SOB, pain. Asked him how his limb jerking was and he was not sure. Did not have any other concerns today.     Physical Exam   Vital Signs: Temp: 98.4  F (36.9  C) Temp src: Oral BP: (!) 146/80 Pulse: 85   Resp: 17 SpO2: 94 % O2 Device: None (Room air)    Weight: 296 lbs 6.4 oz    General: laying in bed, conversing easily   HEENT: NC/AT, MMM, oropharynx clear, anicteric sclera  CV: RRR, normal S1S2, no murmur, clicks, rubs appreciated  Resp: Non-labored respirations, CTAB  Abd: Soft, non-distended, non-tender  Extremities: wwp,  moving all extremities   Skin: No obvious rashes or lesions  Neuro: no myoclonic jerking noted on my exam       Medical Decision Making       50 MINUTES SPENT BY ME on the date of service doing chart review, history, exam, documentation & further activities per the note.      Data

## 2023-08-01 NOTE — PROGRESS NOTES
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  Postop course complicated by profound deconditioning.  He was admitted to TCU initially on 12/23/2022 for physical rehabilitation, however therapies no longer worked with patient after several months of an in bed therapy and his refusals to mobilize.  Underwent biopsy on 4/7/2023 without evidence of infection.  He was transferred back to [facility] for left total hip arthroplasty and reimplantation on 5/26/23 with Dr. Meyers.  Presented again to [place] for persistent MRSA bacteremia on 6/17. His course has been c/b HALEY. Baseline creat 1.0 as recent as 6/19/23).         Likely non oliguric HALEY stage II (UOP not recording), now improving  Recurrent HALEY in setting of significant hemodynamic changes including diuretics and Vancomycin use   Since his admission, have had multiple HALEY episodes. Again developed HALEY in July with creatinine peak of 6.05 on 7/25 and started to improve to 3.7 today with eGFR of 16 ml. He remained altered but awake.  Mentation and myoclonic jerks stable/not improving with improvement in renal function/ BUN levels. Neurology was suspecting uremic syndrome when he had HALEY, seems like not been involved as his renal function been improving. Was not able to do imaging to rule out intracranial pathology as he was not able to hold still for the imaging. At that time cefepime induced neurotoxicity and or thiamine def were differentials as well along with uremia.   - continue holding diuresis. No acute indication for iHD initiation today  - with given trajectory, hoping for continued  "renal function improvement but unclear if that would improve his mentation.  - underwent catheter placement today by IR in anticipation of HD session, but with improved renal function, will hold off on dialysis for now.     3. HTN - Borderline control in HALEY. Bp 150-160's. No edema  Current regimen: Amlodipine 10 mg daily    - continue holding diuretic     4. MRSA bacteremia- Will be completing 6 wks of Vancomycin and ceftazidime 8/1/23. Then start po cipro and doxycycline on 8/2 for 6 weeks..    - UC with VRE but only 10-50 k. No treatment required   - Per ID     5. Electrolytes  Hypernatremia, agree with D5 water     6 Acid base - bicarb stable  - will monitor closely     7. BMD - Ca 8.7 Phos 4.7, improved from 5.3, albumin 3.2   - Malnutrition present: Getting MVI. Would benefit from dietary supplement   - No need for Phos binder at this time   - Vit D 16   - Began D3 - 50 mcq every day 7/18   - Switched to normal diet 7/26     8. Anemia - Hgb stable around 7-8   - Etiology acute illness   - On Iron, folic acid   - Iron studies 6/26/23: Ferritin 2616, Fe 30 IS 21   - Does have DVT history on Eliquis - switched to heparin   - Ferritin too high for IV iron   - Transfusions per primary team    Recommendations were communicated to primary team via this note and over phone.    Zayda Lucero MD   Division of Renal Disease and Hypertension  [facility]  [employer]  Vocera Web Console    Interval History :   Nursing and provider notes from last 24 hours reviewed.  In the last 24 hours Waldo Rodriguezy been hemodynamically stable. C/o right hip and left middle quadrant pain.   Review of Systems:   Was not able to review because of change in mentation.    Physical Exam:   I/O last 3 completed shifts:  In: 365 [P.O.:355; I.V.:10]  Out: -    BP (!) 161/87 (BP Location: Right arm)   Pulse 87   Temp 97.6  F (36.4  C) (Oral)   Resp 16   Ht 1.778 m (5' 10\")   Wt 134.4 kg (296 lb 6.4 oz)   SpO2 98%   BMI 42.53 kg/m   "     General : Pt awake, not in acute distress   Lungs : anterior lung fields are clear  Cardiac : S1, S2 present  Abdomen : Soft/ND/NT  LE : no Edema noted  Dialysis Access : n/a    Labs:   All labs reviewed by me  Electrolytes/Renal -   Recent Labs   Lab Test 08/01/23  1732 08/01/23  1342 08/01/23  1022 07/31/23  1849 07/31/23  0736 07/30/23  2156 07/30/23 2014 07/30/23  0811 07/12/23  1209 07/12/23  0911 07/05/23  0823 07/05/23  0700 07/02/23  1754 07/02/23  1149   NA  --   --  147*  --  145  --  145 148*   < > 143   < > 142   < > 137   POTASSIUM  --   --  3.3*  --  3.2*  --   --  3.6   < > 4.1   < > 4.5   < > 4.3   CHLORIDE  --   --  110*  --  109*  --   --  112*   < > 112*   < > 108*   < > 107   CO2  --   --  23  --  24  --   --  24   < > 19*   < > 16*   < > 21*   BUN  --   --  44.3*  --  47.2*  --   --  50.4*   < > 43.6*   < > 46.3*   < > 39.1*   CR  --   --  3.79*  --  4.22*  --   --  4.66*   < > 4.44*   < > 3.30*   < > 2.20*   * 103* 106*   < > 130*   < >  --  91   < > 105*   < > 99   < > 128*   MAIKOL  --   --  8.4*  --  8.5*  --   --  8.3*   < > 8.3*   < > 7.7*   < > 7.9*   MAG  --   --   --   --   --   --   --   --   --  1.6*  --  1.7  --  1.7   PHOS  --   --  3.8  --  4.4  --   --  4.8*   < > 5.0*   < >  --   --   --     < > = values in this interval not displayed.       CBC -   Recent Labs   Lab Test 08/01/23  1022 07/30/23  0811 07/28/23  1025   WBC 8.0 8.3 9.9   HGB 8.8* 7.6* 8.3*    294 311       LFTs -   Recent Labs   Lab Test 08/01/23  1022 07/31/23  0736 07/30/23  0811 07/06/23  1708 07/06/23  0713 07/04/23  0627 06/22/23  0711 06/20/23  0704   ALKPHOS  --   --   --   --  103 99  --  93   BILITOTAL  --   --   --   --  <0.2 0.2  --  0.2   ALT  --   --   --   --  12 14  --  14   AST  --   --   --   --  9 11  --  14   PROTTOTAL  --   --   --   --  5.3* 5.9*  --  5.9*   ALBUMIN 3.1* 3.2* 3.1*   < > 2.4*  2.4* 2.6*   < > 2.3*    < > = values in this interval not displayed.       Iron Panel  -   Recent Labs   Lab Test 06/26/23  0513   IRON 30*   IRONSAT 21   FADI 2,616*       Current Medications:    acetaminophen  975 mg Oral Q8H     amLODIPine  10 mg Oral Daily     [Held by provider] apixaban ANTICOAGULANT  2.5 mg Oral BID     atorvastatin  40 mg Oral Daily     cefTAZidime  2 g Intravenous Q24H     [START ON 8/2/2023] ciprofloxacin  500 mg Oral Q24H [facility]     [START ON 8/2/2023] doxycycline hyclate  100 mg Oral Q12H [facility] (08/20)     ferrous sulfate  325 mg Oral Every Other Day     folic acid  1 mg Oral Daily     [Held by provider] heparin ANTICOAGULANT  5,000 Units Subcutaneous Q12H     heparin lock flush  5-20 mL Intracatheter Q24H     insulin aspart  1-7 Units Subcutaneous TID AC     insulin aspart  1-5 Units Subcutaneous At Bedtime     lactobacillus rhamnosus (GG)  1 capsule Oral BID     miconazole   Topical BID     multivitamin w/minerals  1 tablet Oral Daily     senna-docusate  1 tablet Oral BID     sodium chloride (PF)  10-40 mL Intracatheter Q8H     cholecalciferol  50 mcg Oral Daily       D5W 75 mL/hr at 08/01/23 1547     sodium chloride 0.9%       Zayda Lucero MD

## 2023-08-02 NOTE — PROGRESS NOTES
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  Postop course complicated by profound deconditioning.  He was admitted to TCU initially on 12/23/2022 for physical rehabilitation, however there was no progress with therapies after several months of in bed therapy and his refusals to mobilize. Underwent biopsy on 4/7/2023 without evidence of infection. He was transferred back to [facility] for left total hip arthroplasty and reimplantation on 5/26/23 with Dr. Meyers and was back in TCU Presented again to [place] for persistent MRSA bacteremia on 6/17 and has remained inpatient since that time. Ultimately has not been out of hospital or rehab since 11/22/22. Pt having ongoing encephalopathy and clearly lacks capacity, current though is 2/2 renal failure and uremia.     Today:  - Renal function continues to improve, continuing to defer HD  - If no mentation improvement by Friday, will re-consult neuro  - D5W discontinued with improving Na, will continue to monitor  - Transitioned to PO Doxy + Cipro 8/2, continue through 9/13  - Resumed Apixaban 8/2 given no planned procedures, improved renal function   - Pending renal improvement, repeat neuro recs, will need to arrange another [facility] discussion with patient, brother Ivan     Goals of Care  Complex medical course over the past 6+ months. Was in [place] [facility] since 12/2022 due to profound deconditioning, but ultimately refused to participate meaningfully in therapies. Was readmitted in 6/23 with MRSA  bacteremia after repeat hip surgery in 5/2023. His hospital course has been complicated by acute renal failure, multifactorial encephalopathy and apathy/refusal to participate in therapies or medical cares. He was assessed by psychiatry on 7/18 and felt to be non-decisional regarding complex medical decisions or disposition. His brother (Ivan) is his legal POA, but Don does not have a healthcare directive. Care conference held on 7/21 with medicine, palliative, renal, SW, RN manager - discussed the above and options including a short trial (~2 weeks) of dialysis to see if it improves his mental function and willingness/ability to participate in therapies vs moving towards a comfort-based approach. Ultimately, Ivan decided to move forward with the trial of dialysis and a plan to reassess in ~2 weeks to determine next steps. We discussed that if he does not meaningfully improve, outpatient dialysis would likely not be an option due to his profound immobility.  - Initially planned to move forward with short trial of dialysis, this was then held as nephro thought he was pending renal recovery.   - Tunneled HD line placed 8/1, will not yet start HD, continue to monitor per nephrology   - Possibly planning for HD this week, but even if the uremia was causing his altered mental status and was cleared with HD, would not expect to see improvement for many days  - Continue full code for now. Ivan to discuss more with his brothers.    Multifactorial encephalopathy - likely toxic metabolic, uremic, delirium, depression contributing  Noted to have acutely worsening mental status around ~7/4, which was initially felt to be related to cefepime neurotoxicity. Neurology consulted. EEG without seizures. CT head, ammonia and VBG wnl. Completed 5 days of antibiotics. No clinical or objective findings concerning for worsening infection. Did have worsening renal function and uremia, and so without clear other etiology discussion was  that needed to rule out uremia definitively and agreed to pursue HD for toxin clearance and re-assessment of mentation. However, renal functin has improved with uremia down-trending with no improvement in mental status. This is superimposed on several months of refusal to participate in cares. Concern for underlying cognitive decline and uncontrolled depression.  - Delirium precautions  - Nephrology following  - Psychiatry consulted on 7/18  for capacity assessment - pt not decisional regarding complex medical decisions or disposition.  - HD line placed 8/1, will continue assessing need for HD daily   -Will consider re-consulting neuro and consider MRI in coming days, however pt would likely need significant sedation for this (such as general anesthesia) given his mental status and hx agitation so holding off for now  - Will also reconsider consulting psychiatry for severe depression as cause of mental status    MRSA bacteremia due to possible left hip septic arthritis vs other source - resolved   Blood cultures: 6/14, 6/15 + for MRSA (4/4), and 6/16 (1/2 GPCs) and 6/17 (2/2) GPCs. - started vancomycin 6/15.   6/17 sinovial fluid cultures with staph and klebsiella. Purulent drainage noted at incision site at TCU, CT left hip concerning for infection (no mention of joint, mostly anterior in soft tissue) and couldn't exclude abscess/inf  - ID intermittently following through hospital course. Last seen 7/21.  - Continue ceftazidime, vancomycin for C.acnes, Pseudomonas, MRSA PJI until 8/1/2023 (completed)   - Start oral doxycycline and ciprofloxacin on 8/2/2023 through 9/13 (total 12 weeks therapy)  - ID follow up ~8/21 (can be inpatient team if remains inpatient or can be outpatient)     Acute Kidney Injury / Acute renal failure - multifactorial, see above/below - improving   Nephrology consulted, appreciate recs. Baseline creatinine 1.0 or 1.1. Etiology: suspect combo of sepsis/inflammation, hypovolemia, blood loss  anemia following surgery, vancomycin. UA recollected on 7/19 with granular casts suggesting ongoing dense ATN. Creatinine continuing to uptrend, however no acidosis, electrolyte abnormalities or evidence of hypervolemia. Some concern that uremia could be contributing to his encephalopathy. After GOC on 7/21, planned to pursue short trial of dialysis.  - Nephrology consulted  - Initially planned for short trial of HD, has been deferred d/t renal recovery, HD line placed 8/1, continue to assess need for HD daily      Asymptomatic bacteruria (VRE)  UA obtained on 7/19 to assess for casts, reflexed to culture which is growing VRE. Labs and vitals with low concern for new infection. Discussed with ID who recommended against treatment.    Bilateral LE edema due to volume overload from HALEY and iatrogenic from blood transfusions  Hyperesthesia of bilateral lower extremities (no signs of cellulitis as of 7/13)  - Holding diuresis starting 7/20 while renal recovery ongoing     L heel pressure wound, unstagable  Noted earlier in pts hospitalization (~6/20), but at that time he refused assessment. Noted again by nursing to be worsening on 7/19 and WOCN consult was placed. Pt was agreeable and had wound assessed. He has been mostly non-agreeable to offloading or other preventative measures recommended by RN and WOCN, but will sometimes allow it with significant encouragement.  - WOCN consult  - Continue to encourage pt to utilize offloading techniques and to participate in wound cares.    Hypernatremia - resolved & recurrent   Uptrending recently, 147 on 7/18. Likely volume down. S/p D5W on 7/18, resolved. Elevated again and responded to D5W on 7/29, and again on 8/1  - Monitor.  - Nephro following  - D5W for free water deficits as needed -> resumed 75mL/hour on 8/1, discontinued 8/2   - Encourage PO intake     Hypoxemic Respiratory Failure due to immobility, surgery - resolved  - continue to follow; encourage IS    Acute blood  loss anemia following surgery, complicated by use of blood thinner  - 3 units day after surgery did joint washout (6/22) for acute blood loss anemia, 1 unit 6/25  - watch for bleeding; hold apixaban if need be - had unprovoked dvt in 2018 and would be on eliquis for life, high risk of recurrent DVT given recent procedures and illness/immobility  - iron and folic acid daily  - resumed Apixaban 8/2     S/p left hip explantation of left hip antibiotic spacer and revision of left total hip arthroplasty (5/26/2023)  - ortho performed washout 6/21  -Activity: 50% PWB LLE with posterior hip precautions [id number]  -AFO ordered for foot drop but patient refusing  -PT/OT  -Knee to be kept even in a foam leg elevator to relax sciatic nerve  -Pain: holding robaxin 4 times a day with mental status changes (discontinuing), APAP PRN  -stop tramadol (7/11) due to renal function  -increase oxycodone 2.5-5 mg q6h PRN- taking very minimal amts so unlikely to be contributing to mental status     Type 2 diabetes mellitus  - Last hemoglobin A1c 5.6% on 6/16/2023.  On glipizide, metformin, Actos, and Victoza prior to hospitalizations and surgery.   - continue ISS    Constipation, improved  -Continue Colace 100 mg twice daily  -Continue senna 1 tablet twice daily-- had briefly increased to 2 tab twice daily due to lack of stool, now improved  -Continue MiraLAX daily     Hypomagnesemia  -monitor and replete      Severe malnutrition in the context of acute on chronic illness  Goal for patient is to consume % of meals TID. Goal is 9311-0438 kcals/daily. Protein needs- 100-126 grams/daily.   -RD consult and appreciate recs  -regular diet  -Weekly weights, daily I/O     Adjustment disorder with depressed mood  Prolonged grief disorder  Patient has been with depressed mood in setting of ongoing health issues, as evidenced by lack of motivation to work with therapies during both TCU admissions.  Past history of passive suicidal statements.  Psychiatry saw patient during hospital admission on 5/31 due to lack of motivation with therapies. Patient declined interest in starting any psychiatric medications. Was willing to engage with health psychology.   -Outpatient health psychology consult  -Continue to monitor for willingness to start antidepressant therapy     HTN:   - Started amlodipine 7/16, increased to 10mg 7/30    Stable and Resolved Issues:  Hyperlipidemia-continue PTA Lipitor 40 mg daily  JAIR-CPAP when sleeping       Diet: Renal Diet (non-dialysis)    DVT Prophylaxis: Heparin SQ  Tran Catheter: Not present  Lines: PRESENT      CVC Single Lumen Right Internal jugular Non - valved (open ended);Tunneled;Power injectable-Site Assessment: WDL  CVC Double Lumen Left Internal jugular Non - valved (open ended);Tunneled-Site Assessment: WDL      Cardiac Monitoring: None  Code Status: Full Code      Clinically Significant Risk Factors        # Hypokalemia: Lowest K = 3.3 mmol/L in last 2 days, will replace as needed  # Hypernatremia: Highest Na = 147 mmol/L in last 2 days, will monitor as appropriate      # Hypoalbuminemia: Lowest albumin = 2.2 g/dL at 7/8/2023  8:51 AM, will monitor as appropriate  # Coagulation Defect: INR = 1.34 (Ref range: 0.85 - 1.15) and/or PTT = N/A, will monitor for bleeding     # Acute Kidney Injury, unspecified: based on a >150% or 0.3 mg/dL increase in last creatinine compared to past 90 day average, will monitor renal function          # Severe Malnutrition: based on nutrition assessment         Disposition Plan           Jannie Gipson MD  Hospitalist Service, GOLD TEAM 21  [facility]  Securely message with [employer] (more info)  Text page via [employer] Paging/Directory   See signed in provider for up to date coverage information  ______________________________________________________________________    Interval History   No acute events overnight. Does note having some discomfort  "at site of tunneled line, states they \"pushed really hard\" when they put it in. No other concerns today. No chest pain, SOB, palpitatins, fevers, chills. Is frequently refusing cares. Thinks that jerking of extremities is worse today.     Physical Exam   Vital Signs: Temp: 98.7  F (37.1  C) Temp src: Oral BP: (!) 164/72 Pulse: 84   Resp: 16 SpO2: 98 % O2 Device: None (Room air) Oxygen Delivery: 2 LPM  Weight: 296 lbs 6.4 oz    General: laying in bed, conversing easily   HEENT: NC/AT, MMM, oropharynx clear, anicteric sclera  CV: RRR, normal S1S2, no murmur, clicks, rubs appreciated  Resp: Non-labored respirations, CTAB  Abd: Soft, non-distended, non-tender  Extremities: wwp,  moving all extremities   Skin: No obvious rashes or lesions  Neuro: myoclonic jerking in all extremities       Medical Decision Making       50 MINUTES SPENT BY ME on the date of service doing chart review, history, exam, documentation & further activities per the note.      Data   "

## 2023-08-02 NOTE — PLAN OF CARE
"  VS: BP (!) 157/92 (BP Location: Left arm)   Pulse 90   Temp 97.2  F (36.2  C) (Oral)   Resp 16   Ht 1.778 m (5' 10\")   Wt 134.4 kg (296 lb 6.4 oz)   SpO2 98%   BMI 42.53 kg/m       O2: RA   Output: Incontinent of bowel and bladder.   Last BM: 08/01/23. Incontinent. Had BM x2 tonight.    Activity: Uses ceiling lift for transfer. Not OOB.    Skin: Blanchable redness on sacrum/coccyx, barrier cream applied.   L hip incision scar  L heel wound - wound care done by [facility] 08/01/23.  Scattered skin scratches on bilateral thigh.    Pain: Leg pain   CMS: A&O x1. Intermittent confusion. Patient stated \" I lost my phone again when I went down for IR\". The NST pointed out to him that his phone is on his bedside table.    Dressing: CDI.    Diet: Renal diet    LDA: CVC single lumen on right chest infusing 75ml/hr 5% Dextrose.  CVC double lumen on left chest, placed on 08/01/23   Equipment: IV pole/pump, ceiling lift and personal belongings.    Plan: Continue plan of care.    Additional Info: Called pharmacy to verify Ceftazidime if compatible with 5% Dextrose: Per pharmacy ok to give and compatible.                            "

## 2023-08-02 NOTE — PROGRESS NOTES
"BP (!) 164/72 (BP Location: Left arm)   Pulse 84   Temp 98.7  F (37.1  C) (Oral)   Resp 16   Ht 1.778 m (5' 10\")   Wt 134.4 kg (296 lb 6.4 oz)   SpO2 98%   BMI 42.53 kg/m    Iso:  Contact  Diet: Renal Diet (non-dialysis)  Mental Status: pt  alert but intermittently confused  Main Acuity Score: 313  O2: RA  K:  3.3 (08/02 0803)  PLT: 282 (08/01 1022)  HGB: 8.8 (08/01 1022)  BS: 132 (08/02 0803)  Infusions: D5 @ 75mL/hr through cvc single lumen R chest     Pt let nursing staff change and turn him x2, coccyx and groin wounds cleansed and barrier cream applied, refused to let RN assess his L heel wound. Incontinent of bowel and bladder. Poor appetite. Plan on having dialysis tomorrow at 1400.     "

## 2023-08-02 NOTE — PROGRESS NOTES
SW sent email to nephew/Pollo with the [employer] bill to help with the process of MA application.     NEREYDA Sharp   [facility], Transitional Care Unit   Social Work   [address] [place] [address], [address] [place] [address] (ph) [phone]

## 2023-08-02 NOTE — PROGRESS NOTES
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  Postop course complicated by profound deconditioning.  He was admitted to TCU initially on 12/23/2022 for physical rehabilitation, however therapies no longer worked with patient after several months of an in bed therapy and his refusals to mobilize.  Underwent biopsy on 4/7/2023 without evidence of infection.  He was transferred back to [facility] for left total hip arthroplasty and reimplantation on 5/26/23 with Dr. Meyers.  Presented again to [facility] for persistent MRSA bacteremia on 6/17. His course has been c/b HALEY. Baseline creat 1.0 as recent as 6/19/23).         Likely non oliguric HALEY stage II (UOP not recording), now improving  Recurrent HALEY in setting of significant hemodynamic changes including diuretics and Vancomycin use   Since his admission, have had multiple HALEY episodes. Again developed HALEY in July with creatinine peak of 6.05 on 7/25 and started to improve to 3.7 today with eGFR of 17 ml. He remained altered but awake.  Mentation and myoclonic jerks stable/not improving with improvement in renal function/ BUN levels. Neurology was suspecting uremic syndrome when he had HALEY, seems like not been involved as his renal function been improving. Was not able to do imaging to rule out intracranial pathology as he was not able to hold still for the imaging. At that time cefepime induced neurotoxicity and or thiamine def were differentials as well along with uremia.   - continue holding diuresis. No acute indication for iHD initiation today  - with given trajectory, hoping for continued  "renal function improvement but unclear if that would improve his mentation.  - underwent catheter placement today by IR in anticipation of HD session, but with improved renal function, will hold off on dialysis for now.  - can consider re-evaluation with neurology and or psychology (to rule out severe depression).     3. HTN - Borderline control in HALEY. Bp 150-160's. No edema  Current regimen: Amlodipine 10 mg daily    - continue holding diuretic     4. MRSA bacteremia- Will be completing 6 wks of Vancomycin and ceftazidime 8/1/23. Then start po cipro and doxycycline on 8/2 for 6 weeks..    - UC with VRE but only 10-50 k. No treatment required   - Per ID     5. Electrolytes  Hypernatremia, improving with D5 water     6 Acid base - bicarb stable  - will monitor closely     7. BMD - Ca 8.3 Phos 4.2, albumin 3.1   - Malnutrition present: Getting MVI. Would benefit from dietary supplement   - No need for Phos binder at this time   - Vit D 16   - Began D3 - 50 mcq every day 7/18   - Switched to normal diet 7/26     8. Anemia - Hgb stable around 7-8   - Etiology acute illness   - On Iron, folic acid   - Iron studies 6/26/23: Ferritin 2616, Fe 30 IS 21   - Does have DVT history on Eliquis - switched to heparin   - Ferritin too high for IV iron   - Transfusions per primary team    Recommendations were communicated to primary team via this note and over phone.    Zayda Lucero MD   Division of Renal Disease and Hypertension  [facility]  [employer] [facility]  Vocera Web Console    Interval History :   Nursing and provider notes from last 24 hours reviewed.  In the last 24 hours Waldo Rodriguezy been hemodynamically stable. No new symptoms today    Review of Systems:   Was not able to review because of change in mentation.    Physical Exam:   I/O last 3 completed shifts:  In: 218.75 [I.V.:218.75]  Out: -    BP (!) 164/72 (BP Location: Left arm)   Pulse 84   Temp 98.7  F (37.1  C) (Oral)   Resp 16   Ht 1.778 m (5' 10\")   Wt 134.4 kg " (296 lb 6.4 oz)   SpO2 98%   BMI 42.53 kg/m       General : Pt awake, not in acute distress   Lungs : anterior lung fields are clear  Cardiac : S1, S2 present  Abdomen : Soft/ND/NT  LE : no Edema noted  Dialysis Access : n/a    Labs:   All labs reviewed by me  Electrolytes/Renal -   Recent Labs   Lab Test 08/02/23  0803 08/02/23  0737 08/02/23  0159 08/01/23  2206 08/01/23  1953 08/01/23  1342 08/01/23  1022 07/31/23  1849 07/31/23  0736 07/12/23  1209 07/12/23  0911 07/05/23  0823 07/05/23  0700 07/02/23  1754 07/02/23  1149     --   --   --  145  --  147*  --  145   < > 143   < > 142   < > 137   POTASSIUM 3.3*  --   --   --   --   --  3.3*  --  3.2*   < > 4.1   < > 4.5   < > 4.3   CHLORIDE 108*  --   --   --   --   --  110*  --  109*   < > 112*   < > 108*   < > 107   CO2 22  --   --   --   --   --  23  --  24   < > 19*   < > 16*   < > 21*   BUN 43.3*  --   --   --   --   --  44.3*  --  47.2*   < > 43.6*   < > 46.3*   < > 39.1*   CR 3.71*  --   --   --   --   --  3.79*  --  4.22*   < > 4.44*   < > 3.30*   < > 2.20*   * 130* 120*   < >  --    < > 106*   < > 130*   < > 105*   < > 99   < > 128*   MAIKOL 8.3*  --   --   --   --   --  8.4*  --  8.5*   < > 8.3*   < > 7.7*   < > 7.9*   MAG  --   --   --   --   --   --   --   --   --   --  1.6*  --  1.7  --  1.7   PHOS 4.2  --   --   --   --   --  3.8  --  4.4   < > 5.0*   < >  --   --   --     < > = values in this interval not displayed.       CBC -   Recent Labs   Lab Test 08/01/23  1022 07/30/23  0811 07/28/23  1025   WBC 8.0 8.3 9.9   HGB 8.8* 7.6* 8.3*    294 311       LFTs -   Recent Labs   Lab Test 08/02/23  0803 08/01/23  1022 07/31/23  0736 07/06/23  1708 07/06/23  0713 07/04/23  0627 06/22/23  0711 06/20/23  0704   ALKPHOS  --   --   --   --  103 99  --  93   BILITOTAL  --   --   --   --  <0.2 0.2  --  0.2   ALT  --   --   --   --  12 14  --  14   AST  --   --   --   --  9 11  --  14   PROTTOTAL  --   --   --   --  5.3* 5.9*  --  5.9*   ALBUMIN  3.1* 3.1* 3.2*   < > 2.4*  2.4* 2.6*   < > 2.3*    < > = values in this interval not displayed.       Iron Panel -   Recent Labs   Lab Test 06/26/23  0513   IRON 30*   IRONSAT 21   FADI 2,616*       Current Medications:   acetaminophen  975 mg Oral Q8H    amLODIPine  10 mg Oral Daily    [Held by provider] apixaban ANTICOAGULANT  2.5 mg Oral BID    atorvastatin  40 mg Oral Daily    ciprofloxacin  500 mg Oral Q24H LUIS A    doxycycline hyclate  100 mg Oral Q12H [facility] (08/20)    ferrous sulfate  325 mg Oral Every Other Day    folic acid  1 mg Oral Daily    [Held by provider] heparin ANTICOAGULANT  5,000 Units Subcutaneous Q12H    heparin lock flush  5-20 mL Intracatheter Q24H    insulin aspart  1-7 Units Subcutaneous TID AC    insulin aspart  1-5 Units Subcutaneous At Bedtime    lactobacillus rhamnosus (GG)  1 capsule Oral BID    miconazole   Topical BID    multivitamin w/minerals  1 tablet Oral Daily    senna-docusate  1 tablet Oral BID    sodium chloride (PF)  10-40 mL Intracatheter Q8H    cholecalciferol  50 mcg Oral Daily      D5W 75 mL/hr at 08/02/23 0532    sodium chloride 0.9%       Zayda Lucero MD

## 2023-08-02 NOTE — PROGRESS NOTES
TCDEON CARRINGTON sent email to nephew/Luke with the [employer] bill to help with the process of MA application.       NEREYDA Sharp   [facility], Transitional Care Unit   Social Work   [address] [place] [address], [address] [place] [address]  (PH) [phone]

## 2023-08-02 NOTE — PLAN OF CARE
Goal Outcome Evaluation:  VS: Refused VS   O2: Refused.   Output: Incontinent of bowel and bladder. Pt has brief on.   Last BM:  8/1 per chart. Pt unable to recall.   Activity: Up with A X2. Pt refused to turn or repositions in bed.     Skin: WDL except, wounds and rash, Scattered skin scratches on bilateral thigh.    Pain: Pt c/o pain on left hip and left side of chest from CVC insertion. Patient refused any pain medication.    CMS: Intact, AOx2.intermittent confusion and forgetful.  Pt is having difficulty finding words and he gets so frustrated.    Dressing: UTV.    Diet: Renal diet. Pt had tow bites of his meal from lunch. Refused to eat dinner. BG was 132.   LDA: CVC on R and L chest hep locked.    Equipment: IV pole, personal belongings,    Plan: Contact precautions maintained / Continue with plan of care. Call light within reach, pt able to make needs known.    Additional Info:

## 2023-08-03 NOTE — PLAN OF CARE
"Pt is remain alert and oriented to person. Intermittent confusion. Pt seems to be tired  and sleeping this morning between cares. Pt denies SOB, chest pain, N/V. Pt refused all his medications this morning stating, \"I don't want  to take them; they are not helping\" Pt also refused labs this morning; lb re-attempted later. Pt is incontinent of B&B and was able to let staff change his brief, reposition,and complete a partial bed bath. Redness remain to buttock, sacral and perineal area;barrier cream applied after cleaning. Pt refused his left heel wound to be assessed. Poor appetite stating \"nothing no longer tastes good\" Pt has  slow, word finding speech;Intermittent jerky movements noted with upper extremities. Dialysis not done today; held awaiting labs to determine kidney functions and neurology consult.    Plan: Continue with the current POC.  "

## 2023-08-03 NOTE — PROGRESS NOTES
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 on 11/22/2022 for scheduled explant of left hip prosthesis, debridement, and reconstruction with antibiotic spacer.  Postop course complicated by profound deconditioning.  He was admitted to TCU initially on 12/23/2022 for physical rehabilitation, however therapies no longer worked with patient after several months of an in bed therapy and his refusals to mobilize.  Underwent biopsy on 4/7/2023 without evidence of infection.  He was transferred back to [facility] for left total hip arthroplasty and reimplantation on 5/26/23 with Dr. Meyers.  Presented again to [facility] for persistent MRSA bacteremia on 6/17. His course has been c/b HALEY. Baseline creat 1.0 as recent as 6/19/23).         Likely non oliguric HALEY stage II (UOP not recording), now improving  Recurrent HALEY in setting of significant hemodynamic changes including diuretics and Vancomycin use   Since his admission, have had multiple HALEY episodes. Again developed HALEY in July with creatinine peak of 6.05 on 7/25 and started to improve to 3.7 on 8/2 with eGFR of 17 ml. Unfortunately no labs this morning. He remained altered but awake.  Mentation and myoclonic jerks stable/not improving with improvement in renal function/ BUN levels. Neurology was suspecting uremic syndrome when he had HALEY, seems like not been involved as his renal function been improving. Was not able to do imaging to rule out intracranial pathology as he was not able to hold still for the imaging. At that time cefepime induced neurotoxicity and or thiamine def were differentials as well along with uremia.   - continue holding diuresis. No acute indication for iHD initiation today  - with  given trajectory, hoping for continued renal function improvement but unclear if that would improve his mentation.  - underwent catheter placement today by IR in anticipation of HD session, but with improved renal function, will hold off on dialysis for now. Likely to remove catheter provided stable/improving renal labs.  - can consider re-evaluation with neurology and or psychology (to rule out severe depression).     3. HTN - Borderline control in HALEY. Bp 150-160's. No edema  Current regimen: Amlodipine 10 mg daily    - continue holding diuretic     4. MRSA bacteremia- Will be completing 6 wks of Vancomycin and ceftazidime 8/1/23. Then start po cipro and doxycycline on 8/2 for 6 weeks..    - UC with VRE but only 10-50 k. No treatment required   - Per ID     5. Electrolytes  Hypernatremia, improving with D5 water     6 Acid base - bicarb stable  - will monitor closely     7. BMD - Ca 8.3 Phos 4.2, albumin 3.1   - Malnutrition present: Getting MVI. Would benefit from dietary supplement   - No need for Phos binder at this time   - Vit D 16   - Began D3 - 50 mcq every day 7/18   - Switched to normal diet 7/26     8. Anemia - Hgb stable around 7-8   - Etiology acute illness   - On Iron, folic acid   - Iron studies 6/26/23: Ferritin 2616, Fe 30 IS 21   - Does have DVT history on Eliquis - switched to heparin   - Ferritin too high for IV iron   - Transfusions per primary team    Recommendations were communicated to primary team via this note and over phone.    Zayda Lucero MD   Division of Renal Disease and Hypertension  [facility]  [employer] Web Console    Interval History :   Nursing and provider notes from last 24 hours reviewed.  In the last 24 hours Waldo Bustos been hemodynamically stable. No new symptoms today    Review of Systems:   Was not able to review because of change in mentation.    Physical Exam:   No intake/output data recorded.   BP (!) 146/67 (BP Location: Left arm)   Pulse 84   Temp  "98.6  F (37  C) (Oral)   Resp 16   Ht 1.778 m (5' 10\")   Wt 134.4 kg (296 lb 6.4 oz)   SpO2 96%   BMI 42.53 kg/m       General : Pt awake, not in acute distress   Lungs : anterior lung fields are clear  Cardiac : S1, S2 present  Abdomen : Soft/ND/NT  LE : no Edema noted  Dialysis Access : n/a    Labs:   All labs reviewed by me  Electrolytes/Renal -   Recent Labs   Lab Test 08/03/23  1139 08/03/23  0734 08/03/23  0408 08/02/23  1147 08/02/23  0803 08/01/23  2206 08/01/23  1953 08/01/23  1342 08/01/23  1022 07/31/23  1849 07/31/23  0736 07/12/23  1209 07/12/23  0911 07/05/23  0823 07/05/23  0700 07/02/23  1754 07/02/23  1149   NA  --   --   --   --  145  --  145  --  147*  --  145   < > 143   < > 142   < > 137   POTASSIUM  --   --   --   --  3.3*  --   --   --  3.3*  --  3.2*   < > 4.1   < > 4.5   < > 4.3   CHLORIDE  --   --   --   --  108*  --   --   --  110*  --  109*   < > 112*   < > 108*   < > 107   CO2  --   --   --   --  22  --   --   --  23  --  24   < > 19*   < > 16*   < > 21*   BUN  --   --   --   --  43.3*  --   --   --  44.3*  --  47.2*   < > 43.6*   < > 46.3*   < > 39.1*   CR  --   --   --   --  3.71*  --   --   --  3.79*  --  4.22*   < > 4.44*   < > 3.30*   < > 2.20*   * 102* 101*   < > 132*   < >  --    < > 106*   < > 130*   < > 105*   < > 99   < > 128*   MAIKOL  --   --   --   --  8.3*  --   --   --  8.4*  --  8.5*   < > 8.3*   < > 7.7*   < > 7.9*   MAG  --   --   --   --   --   --   --   --   --   --   --   --  1.6*  --  1.7  --  1.7   PHOS  --   --   --   --  4.2  --   --   --  3.8  --  4.4   < > 5.0*   < >  --   --   --     < > = values in this interval not displayed.       CBC -   Recent Labs   Lab Test 08/03/23  1356 08/01/23  1022 07/30/23  0811   WBC 8.1 8.0 8.3   HGB 8.5* 8.8* 7.6*    282 294       LFTs -   Recent Labs   Lab Test 08/02/23  0803 08/01/23  1022 07/31/23  0736 07/06/23  1708 07/06/23  0713 07/04/23  0627 06/22/23  0711 06/20/23  0704   ALKPHOS  --   --   --   --  " 103 99  --  93   BILITOTAL  --   --   --   --  <0.2 0.2  --  0.2   ALT  --   --   --   --  12 14  --  14   AST  --   --   --   --  9 11  --  14   PROTTOTAL  --   --   --   --  5.3* 5.9*  --  5.9*   ALBUMIN 3.1* 3.1* 3.2*   < > 2.4*  2.4* 2.6*   < > 2.3*    < > = values in this interval not displayed.       Iron Panel -   Recent Labs   Lab Test 06/26/23  0513   IRON 30*   IRONSAT 21   FADI 2,616*       Current Medications:   acetaminophen  975 mg Oral Q8H    amLODIPine  10 mg Oral Daily    apixaban ANTICOAGULANT  2.5 mg Oral BID    atorvastatin  40 mg Oral Daily    ciprofloxacin  500 mg Oral Q24H LUIS A    doxycycline hyclate  100 mg Oral Q12H LUIS A (08/20)    ferrous sulfate  325 mg Oral Every Other Day    folic acid  1 mg Oral Daily    heparin lock flush  5-20 mL Intracatheter Q24H    insulin aspart  1-7 Units Subcutaneous TID AC    insulin aspart  1-5 Units Subcutaneous At Bedtime    lactobacillus rhamnosus (GG)  1 capsule Oral BID    miconazole   Topical BID    multivitamin w/minerals  1 tablet Oral Daily    senna-docusate  1 tablet Oral BID    sodium chloride (PF)  10-40 mL Intracatheter Q8H    cholecalciferol  50 mcg Oral Daily      sodium chloride 0.9%       Zayda Lucero MD

## 2023-08-03 NOTE — PROGRESS NOTES
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  Postop course complicated by profound deconditioning.  He was admitted to TCU initially on 12/23/2022 for physical rehabilitation, however there was no progress with therapies after several months of in bed therapy and his refusals to mobilize. Underwent biopsy on 4/7/2023 without evidence of infection. He was transferred back to [facility] for left total hip arthroplasty and reimplantation on 5/26/23 with Dr. Meyers and was back in TCU Presented again to [facility] for persistent MRSA bacteremia on 6/17 and has remained inpatient since that time. Ultimately has not been out of hospital or rehab since 11/22/22. Pt having ongoing encephalopathy and clearly lacks capacity, current though is 2/2 renal failure and uremia.     Today:  - patient declining labs, discussed with nephrology. Will defer HD today.  - consult requested of psychiatry and neurology to evaluate mental status, decision making, and sleep  - Transitioned to PO Doxy + Cipro 8/2, continue through 9/13 (patient declined today)  - Resumed Apixaban 8/2 given no planned procedures, improved renal function   - Pending renal improvement, repeat neuro recs, will need to arrange another [facility] discussion with patient, brother Ivan     Goals of Care  Complex medical course over the past 6+ months. Was in [place] [facility] since 12/2022 due to profound deconditioning, but ultimately refused to participate meaningfully in therapies. Was readmitted in 6/23 with  MRSA bacteremia after repeat hip surgery in 5/2023. His hospital course has been complicated by acute renal failure, multifactorial encephalopathy and apathy/refusal to participate in therapies or medical cares. He was assessed by psychiatry on 7/18 and felt to be non-decisional regarding complex medical decisions or disposition. His brother (Ivan) is his legal POA, but Don does not have a healthcare directive. Care conference held on 7/21 with medicine, palliative, renal, SW, RN manager - discussed the above and options including a short trial (~2 weeks) of dialysis to see if it improves his mental function and willingness/ability to participate in therapies vs moving towards a comfort-based approach. Ultimately, Ivan decided to move forward with the trial of dialysis and a plan to reassess in ~2 weeks to determine next steps. We discussed that if he does not meaningfully improve, outpatient dialysis would likely not be an option due to his profound immobility.  - Initially planned to move forward with short trial of dialysis, this was then held as nephro thought he was pending renal recovery.   - Tunneled HD line placed 8/1, will not yet start HD, continue to monitor per nephrology   - Possibly planning for HD this week, but even if the uremia was causing his altered mental status and was cleared with HD, would not expect to see improvement for many days  - Continue full code for now. Ivan to discuss more with his brothers.    Multifactorial encephalopathy - likely toxic metabolic, uremic, delirium, depression contributing  Noted to have acutely worsening mental status around ~7/4, which was initially felt to be related to cefepime neurotoxicity. Neurology consulted. EEG without seizures. CT head, ammonia and VBG wnl. Completed 5 days of antibiotics. No clinical or objective findings concerning for worsening infection. Did have worsening renal function and uremia, and so without clear other etiology discussion  was that needed to rule out uremia definitively and agreed to pursue HD for toxin clearance and re-assessment of mentation. However, renal functin has improved with uremia down-trending with no improvement in mental status. This is superimposed on several months of refusal to participate in cares. Concern for underlying cognitive decline and uncontrolled depression.  - Delirium precautions  - Nephrology following  - Psychiatry consulted on 7/18  for capacity assessment - pt not decisional regarding complex medical decisions or disposition.  - HD line placed 8/1, will continue assessing need for HD daily   -Will consider re-consulting neuro and consider MRI in coming days, however pt would likely need significant sedation for this (such as general anesthesia) given his mental status and hx agitation so holding off for now  - Will also reconsider consulting psychiatry for severe depression as cause of mental status    MRSA bacteremia due to possible left hip septic arthritis vs other source - resolved   Blood cultures: 6/14, 6/15 + for MRSA (4/4), and 6/16 (1/2 GPCs) and 6/17 (2/2) GPCs. - started vancomycin 6/15.   6/17 sinovial fluid cultures with staph and klebsiella. Purulent drainage noted at incision site at TCU, CT left hip concerning for infection (no mention of joint, mostly anterior in soft tissue) and couldn't exclude abscess/inf  - ID intermittently following through hospital course. Last seen 7/21.  - Continue ceftazidime, vancomycin for C.acnes, Pseudomonas, MRSA PJI until 8/1/2023 (completed)   - Start oral doxycycline and ciprofloxacin on 8/2/2023 through 9/13 (total 12 weeks therapy)  - ID follow up ~8/21 (can be inpatient team if remains inpatient or can be outpatient)     Acute Kidney Injury / Acute renal failure - multifactorial, see above/below - improving   Nephrology consulted, appreciate recs. Baseline creatinine 1.0 or 1.1. Etiology: suspect combo of sepsis/inflammation, hypovolemia, blood  loss anemia following surgery, vancomycin. UA recollected on 7/19 with granular casts suggesting ongoing dense ATN. Creatinine continuing to uptrend, however no acidosis, electrolyte abnormalities or evidence of hypervolemia. Some concern that uremia could be contributing to his encephalopathy. After GOC on 7/21, planned to pursue short trial of dialysis.  - Nephrology consulted  - Initially planned for short trial of HD, has been deferred d/t renal recovery, HD line placed 8/1, continue to assess need for HD daily      Asymptomatic bacteruria (VRE)  UA obtained on 7/19 to assess for casts, reflexed to culture which is growing VRE. Labs and vitals with low concern for new infection. Discussed with ID who recommended against treatment.    Bilateral LE edema due to volume overload from HALEY and iatrogenic from blood transfusions  Hyperesthesia of bilateral lower extremities (no signs of cellulitis as of 7/13)  - Holding diuresis starting 7/20 while renal recovery ongoing     L heel pressure wound, unstagable  Noted earlier in pts hospitalization (~6/20), but at that time he refused assessment. Noted again by nursing to be worsening on 7/19 and WOCN consult was placed. Pt was agreeable and had wound assessed. He has been mostly non-agreeable to offloading or other preventative measures recommended by RN and WOCN, but will sometimes allow it with significant encouragement.  - WOCN consult  - Continue to encourage pt to utilize offloading techniques and to participate in wound cares.    Hypernatremia - resolved & recurrent   Uptrending recently, 147 on 7/18. Likely volume down. S/p D5W on 7/18, resolved. Elevated again and responded to D5W on 7/29, and again on 8/1  - Monitor.  - Nephro following  - D5W for free water deficits as needed -> resumed 75mL/hour on 8/1, discontinued 8/2   - Encourage PO intake     Hypoxemic Respiratory Failure due to immobility, surgery - resolved  - continue to follow; encourage IS    Acute  blood loss anemia following surgery, complicated by use of blood thinner  - 3 units day after surgery did joint washout (6/22) for acute blood loss anemia, 1 unit 6/25  - watch for bleeding; hold apixaban if need be - had unprovoked dvt in 2018 and would be on eliquis for life, high risk of recurrent DVT given recent procedures and illness/immobility  - iron and folic acid daily  - resumed Apixaban 8/2     S/p left hip explantation of left hip antibiotic spacer and revision of left total hip arthroplasty (5/26/2023)  - ortho performed washout 6/21  -Activity: 50% PWB LLE with posterior hip precautions [id number]  -AFO ordered for foot drop but patient refusing  -PT/OT  -Knee to be kept even in a foam leg elevator to relax sciatic nerve  -Pain: holding robaxin 4 times a day with mental status changes (discontinuing), APAP PRN  -stop tramadol (7/11) due to renal function  -oxycodone 2.5-5 mg q6h PRN- taking very minimal amts so unlikely to be contributing to mental status     Type 2 diabetes mellitus  - Last hemoglobin A1c 5.6% on 6/16/2023.  On glipizide, metformin, Actos, and Victoza prior to hospitalizations and surgery.   - continue ISS    Constipation, improved  -Continue Colace 100 mg twice daily  -Continue senna 1 tablet twice daily-- had briefly increased to 2 tab twice daily due to lack of stool, now improved  -Continue MiraLAX daily     Hypomagnesemia  -monitor and replete      Severe malnutrition in the context of acute on chronic illness  Goal for patient is to consume % of meals TID. Goal is 4192-3262 kcals/daily. Protein needs- 100-126 grams/daily.   -RD consult and appreciate recs  -regular diet  -Weekly weights, daily I/O     Adjustment disorder with depressed mood  Prolonged grief disorder  Patient has been with depressed mood in setting of ongoing health issues, as evidenced by lack of motivation to work with therapies during both TCU admissions.  Past history of passive suicidal statements.  Psychiatry saw patient during hospital admission on 5/31 due to lack of motivation with therapies. Patient declined interest in starting any psychiatric medications. Was willing to engage with health psychology.   -Outpatient health psychology consult  -Continue to monitor for willingness to start antidepressant therapy     HTN:   - Started amlodipine 7/16, increased to 10mg 7/30    Stable and Resolved Issues:  Hyperlipidemia-continue PTA Lipitor 40 mg daily  JAIR-CPAP when sleeping     Diet: Renal Diet (non-dialysis)    DVT Prophylaxis: Heparin SQ  Tran Catheter: Not present  Lines: PRESENT      CVC Single Lumen Right Internal jugular Non - valved (open ended);Tunneled;Power injectable-Site Assessment: WDL  CVC Double Lumen Left Internal jugular Non - valved (open ended);Tunneled-Site Assessment: WDL      Cardiac Monitoring: None  Code Status: Full Code      Clinically Significant Risk Factors        # Hypokalemia: Lowest K = 3.3 mmol/L in last 2 days, will replace as needed       # Hypoalbuminemia: Lowest albumin = 2.2 g/dL at 7/8/2023  8:51 AM, will monitor as appropriate  # Coagulation Defect: INR = 1.34 (Ref range: 0.85 - 1.15) and/or PTT = N/A, will monitor for bleeding       # Acute Kidney Injury, unspecified: based on a >150% or 0.3 mg/dL increase in last creatinine compared to past 90 day average, will monitor renal function          # Severe Malnutrition: based on nutrition assessment           Disposition Plan           Hector Burnham MD  Hospitalist Service, GOLD TEAM 21  [facility]  Securely message with [employer] (more info)  Text page via [employer] Paging/Directory   See signed in provider for up to date coverage information  ______________________________________________________________________    Interval History   No acute events overnight. No other concerns today. No chest pain, SOB, palpitatins, fevers, chills. Is frequently refusing cares. Asked if  he wanted to continue being in the hospital or be home and he stated it would be nice to be home.  Not able to process further (ie discuss quality vs quantity of life).  Says he doesn't want to take meds or have labs today.    Physical Exam   Vital Signs: Temp: 98.6  F (37  C) Temp src: Oral BP: (!) 146/67 Pulse: 84   Resp: 16 SpO2: 96 % O2 Device: None (Room air)    Weight: 296 lbs 6.4 oz    General: laying in bed, conversing somewhat slowly   HEENT: NC/AT, MMM, oropharynx clear, anicteric sclera  CV: RRR, normal S1S2, no murmur, clicks, rubs appreciated  Resp: Non-labored respirations, CTAB  Abd: Soft, non-distended, non-tender  Extremities: wwp,  moving all extremities   Skin: No obvious rashes or lesions  Neuro: myoclonic jerking in all extremities       Medical Decision Making       50 MINUTES SPENT BY ME on the date of service doing chart review, history, exam, documentation & further activities per the note.      Data

## 2023-08-03 NOTE — CONSULTS
"[facility]  Neurology Consultation - Progress Note    Patient Name:  Waldo Bustos  Date of Service:  August 3, 2023    Subjective:    Neurology asked to reassess if there could be a neurologic source of encephalopathy.  Was previously felt to be metabolic encephalopathy by neurology July 6.  Hemodialysis was recommended at this time but does not appear has been done yet.  Kidney function is improved a bit and so nephrology is holding off on dialysis at present.  Baseline creatinine was 1.0 as of June 19 according to nephrology.  Appears has been hospitalized for at least the past 8 months with multiple infections and surgeries.  Currently is on Cipro and doxycycline but is not clear that he is always excepting of taking these medications    Objective:    Vitals: BP (!) 146/67 (BP Location: Left arm)   Pulse 84   Temp 98.6  F (37  C) (Oral)   Resp 16   Ht 1.778 m (5' 10\")   Wt 134.4 kg (296 lb 6.4 oz)   SpO2 96%   BMI 42.53 kg/m    General: Lying in bed, NAD, frequent myoclonic jerks  Head: Atraumatic, normocephalic   Cardiac: Lower extremity edema noted  Neurologic:  Neck with some stiffness but no overt meningismus.  Kernig sign negative.  Oriented to person only.  Knows is in hospital but cannot tell me the date.  Follows one-step command but not multiple step commands.  He has a positive grasp reflex.  No overt cranial nerve findings.  Mild right nasolabial flattening but corrects reasonably well with smile.  Appears to have full strength throughout although I cannot fully test left lower extremity due to pain.  No clear increase in tone.  Prominent bilateral asterixis and stimulus induced myoclonus.  No sensory disturbance      Pertinent Investigations:    I have personally reviewed most recent and pertinent labs, tests, and radiological images.     Head CT personally reviewed with moderate to severe atrophy.  Some periventricular white matter hypodensities likely " small vessel ischemic disease.    EEG 3 hr:      Impression:  This is an abnormal EEG due to the presence of moderate to severe generalized slowing of the background activities. Findings are consistent with moderate to severe diffuse encephalopathy of which the etiology is non-specific.  No epileptiform activities or seizures were observed. Patient had some jerks during this recording, with no EEG correlations.       Ammonia, TSH, renal panel, CBC, vitamin b12 reviewed    Assessment  #metabolic encephalopathy    Mr. Bustos is a 72-year-old male with complex past medical history admitted for multiple infections including MRSA bacteremia who neurology is consulted for encephalopathy.  His exam is fitting with metabolic encephalopathy and that it is nonfocal with definite asterixis and myoclonus.  This is suggestive of a toxic or metabolic source.  Do not see any clear toxins on his exam.  Therefore would favor metabolic work-up thus far has been negative with the exception of a clearly elevated creatinine above baseline.  His creatinine has not improved from what it was when last seen by neurology but it was noted to be 3.9.   His exam is also not worsening in that timeframe which argues against progressive central nervous system source.  At this time no further neurologic investigations recommended.  Continue to treat HALEY and would strongly consider dialysis if creatinine not improving on its own.    Recommendations:   - No further work up from neurologic standpoint  - Continue supportive cares  - Consider oral thiamine (already completed IV)  - Schedule melatonin at 8PM to assist with sleep  - Will sign off    Thank you for involving Neurology in the care of Waldo Bustos.      Bindu Serna,       Medical Decision Making       NOTE(S)/MEDICAL RECORDS REVIEWED over the past 24 hours: previous neurology note, nephroloyg note, last medicine note, ID consult  Tests personally interpreted in the past  24 hours:  - HEAD CT showing as above  Tests ORDERED & REVIEWED in the past 24 hours:  - see above  -    CT Hip

## 2023-08-03 NOTE — PLAN OF CARE
Pt A&O but disoriented to time, situations and location.  VSS. Afebrile. 02 sats in the RA. Declined Lungs assessment.. Denies SOB, CP and nausea. Tolerating regular diet. Had loose MB. Incontinent of bowl and bladder. Refused all his hs medications pt was combative during care and hit staff. Pt took morning tylenol. CMS intact, denies N/T. Pt slept between care and is able to make needs known, call light with in reach. Will continue to monitor.   Skin excoriated, protective cream applied

## 2023-08-03 NOTE — CONSULTS
Psychiatry Consultation; Follow up              Reason for Consult:    Depression, capacity, sleep, abnormal movements  Requesting source: Jannie Gipson  Labs and imaging reviewed, seen by JOSEPH Bliss CNP  Total time spent in chart review, patient interview and coordination of care; 60min on date of encounter today            Interim history:    Psychiatry has been consulted to reassess patient's persistent encephalopathy, capacity, as well as myoclonic movements noted. There is also a question of whether or not his depression is contributing to his clinical picture. Hemodialysis was recommended previously but does not appear has been done yet. His prolonged hospital course has also been complicated by cefepime neurotoxicity. Patient intermittently will decline nursing cares/meds. He was sleeping soundly and difficult to arose on interview. He was able to wake up, smile, but then began to exhibit shock-like involuntary movements of both arms. He had difficulty remaining awake for the interview and answering questions. He would stare blankly at me, did agree by nodding head he was having difficulty word finding.         Current Medications:       acetaminophen  975 mg Oral Q8H     amLODIPine  10 mg Oral Daily     apixaban ANTICOAGULANT  2.5 mg Oral BID     atorvastatin  40 mg Oral Daily     ciprofloxacin  500 mg Oral Q24H LUIS A     doxycycline hyclate  100 mg Oral Q12H LUIS A (08/20)     ferrous sulfate  325 mg Oral Every Other Day     folic acid  1 mg Oral Daily     heparin lock flush  5-20 mL Intracatheter Q24H     insulin aspart  1-7 Units Subcutaneous TID AC     insulin aspart  1-5 Units Subcutaneous At Bedtime     lactobacillus rhamnosus (GG)  1 capsule Oral BID     miconazole   Topical BID     multivitamin w/minerals  1 tablet Oral Daily     senna-docusate  1 tablet Oral BID     sodium chloride (PF)  10-40 mL Intracatheter Q8H     cholecalciferol  50 mcg Oral Daily              MSE:   Appearance:  "somnolent   Attitude:  cooperative  Eye Contact:  fair  Mood:  \"OK\"  Affect:  : mood congruent  Speech:  increased speech latency and paucity of speech  Psychomotor Behavior: no tremors noted while sleeping, upon awaking - pt with prominent bilateral asterixis   Muscle strength and tone: decreased              Thought Process:  evidence of thought blocking present  Associations:  no loose associations  Thought Content:  no evidence of suicidal ideation or homicidal ideation  Insight:  limited  Judgement:  limited  Oriented to:  person and hospital only   Attention Span and Concentration:  limited  Recent and Remote Memory:  limited to poor     Vital signs:  Temp: 98.5  F (36.9  C) Temp src: Axillary BP: (!) 161/78 Pulse: 92   Resp: 16 SpO2: 96 % O2 Device: None (Room air) Oxygen Delivery: 2 LPM Height: 177.8 cm (5' 10\") Weight: 134.4 kg (296 lb 6.4 oz)  Estimated body mass index is 42.53 kg/m  as calculated from the following:    Height as of this encounter: 1.778 m (5' 10\").    Weight as of this encounter: 134.4 kg (296 lb 6.4 oz).           DSM-5 Diagnosis:   Unspecified neurocognitive disorder   Delirium           Assessment:   Rahat is a 72-year-old male with complex past medical history admitted for multiple infections including MRSA bacteremia who psychiatry and neurology have been consulted regarding his persistent encephalopathy.     On exam today, he had prominent bilateral asterixis. He is not on any psychotropic medications that could worsen this. I agree with a trial of melatonin as recommended by neurology for sleep to help promote appropriate sleep-wake cycles.     I'm doubtful that pseudo-dementia from depression is contributing to his clinical presentation as he continues to be lighthearted/jokes when awake/alert as well as significant evidence of moderate to severe cerebral atrophy, evidence of small vessel ischemic changes, and mod-severe slowing on EEG.     As his cognition has not improved, he " continues to lack capacity for complex medical decisions as well as discharge decisions (see further rationale in my note from 7/18).         Safia Lozoya, PMHNP-BC  Consult/Liaison Psychiatry   [facility]

## 2023-08-04 NOTE — PLAN OF CARE
VS: Temp: 97.9  F (36.6  C) Temp src: Axillary BP: (!) 151/83 Pulse: 87   Resp: 16 SpO2: 92 % O2 Device: None (Room air)      O2: Stable > 90% on room air, denies SOB and chest pain, denies difficulty breathing, lung sounds clear but diminished.   Output: Voiding adequate, incontinent brief in place.   Last BM: Last BM 8/3/23, incontinent of bowel and bladder.   Activity: Assist x2 Liko lift   Skin: Left hip incision, left heel wound, right groin wound, redness on coccyx, edema in scrotum area.   Pain: Denies pain   Neuro: Orientated to self   Dressing: Mepilex on wound-CDI   Diet: Renal diet, pt refused dinner this shift.   LDA: Left and right CVC   Equipment: Personal belongings in room, call light, IV pole   Plan: Continue with plan of care   Additional Info:

## 2023-08-04 NOTE — PLAN OF CARE
"Shift: 1530-1930      Vital signs:  Temp: 97.5  F (36.4  C) Temp src: Oral BP: (!) 150/74 Pulse: 85   Resp: 16 SpO2: 96 % O2 Device: None (Room air) Oxygen Delivery: 2 LPM Height: 177.8 cm (5' 10\") Weight: 134.4 kg (296 lb 4.8 oz)     VSS. RA. A-2 and WBAT. Renal diet. Port saline locked.  Pt slept majority of shift and did not want to be bothered. Encouraged to order meal, agreed to blood sugar checks. No acute changes this shift.       "

## 2023-08-04 NOTE — PROGRESS NOTES
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  Postop course complicated by profound deconditioning.  He was admitted to TCU initially on 12/23/2022 for physical rehabilitation, however there was no progress with therapies after several months of in bed therapy and his refusals to mobilize. Underwent biopsy on 4/7/2023 without evidence of infection. He was transferred back to [facility] for left total hip arthroplasty and reimplantation on 5/26/23 with Dr. Meyers and was back in TCU Presented again to [facility] for persistent MRSA bacteremia on 6/17 and has remained inpatient since that time. Ultimately has not been out of hospital or rehab since 11/22/22. Pt having ongoing encephalopathy and clearly lacks capacity, current though is 2/2 renal failure and uremia.     Today:  - Neurology and Psychiatry consulted 8/4   = Start Thiamine 100mg qday   = Start scheduled Melatonin 3mg at bedtime   - Cr and BUN continuing to downtrend, no indication for HD, anticipate tunneled line being removed in coming days  - Will discuss with Ivan, given lack of improvement need to have a repeat GOC discussion in coming days   - Of note, patient intermittently declining his antibiotics   - Resumed Apixaban 8/2 given no planned procedures, improved renal function     Goals of Care  Complex medical course over the past 6+ months. Was in [facility] since 12/2022 due to profound deconditioning, but ultimately refused to participate meaningfully in therapies. Was readmitted  in 6/23 with MRSA bacteremia after repeat hip surgery in 5/2023. His hospital course has been complicated by acute renal failure, multifactorial encephalopathy and apathy/refusal to participate in therapies or medical cares. He was assessed by psychiatry on 7/18 and felt to be non-decisional regarding complex medical decisions or disposition. His brother (Ivan) is his legal POA, but Don does not have a healthcare directive. Care conference held on 7/21 with medicine, palliative, renal, SW, RN manager - discussed the above and options including a short trial (~2 weeks) of dialysis to see if it improves his mental function and willingness/ability to participate in therapies vs moving towards a comfort-based approach. Ultimately, Ivan decided to move forward with the trial of dialysis and a plan to reassess in ~2 weeks to determine next steps. We discussed that if he does not meaningfully improve, outpatient dialysis would likely not be an option due to his profound immobility.  - Initially planned to move forward with short trial of dialysis, this was then held as nephro thought he was pending renal recovery.   - Tunneled HD line placed 8/1, will not yet start HD, continue to monitor per nephrology, continues to have renal recovery and no indications for HD   - Continue full code for now. Ivan to discuss more with his brothers.    Multifactorial encephalopathy - likely toxic metabolic, uremic, delirium, depression contributing  Noted to have acutely worsening mental status around ~7/4, which was initially felt to be related to cefepime neurotoxicity. Neurology consulted. EEG without seizures. CT head, ammonia and VBG wnl. Completed 5 days of antibiotics. No clinical or objective findings concerning for worsening infection. Did have worsening renal function and uremia, and so without clear other etiology discussion was that needed to rule out uremia definitively and agreed to pursue HD for toxin clearance and  re-assessment of mentation. However, renal functin has improved with uremia down-trending with no improvement in mental status. This is superimposed on several months of refusal to participate in cares. Concern for underlying cognitive decline and uncontrolled depression.  - Delirium precautions  - Nephrology following  - Psychiatry consulted on 7/18  for capacity assessment - pt not decisional regarding complex medical decisions or disposition.  - Neurology and Psychiatry consulted 8/4   = Start Thiamine 100mg qday   = Start scheduled Melatonin 3mg at bedtime     MRSA bacteremia due to possible left hip septic arthritis vs other source - resolved   Blood cultures: 6/14, 6/15 + for MRSA (4/4), and 6/16 (1/2 GPCs) and 6/17 (2/2) GPCs. - started vancomycin 6/15.   6/17 sinovial fluid cultures with staph and klebsiella. Purulent drainage noted at incision site at TCU, CT left hip concerning for infection (no mention of joint, mostly anterior in soft tissue) and couldn't exclude abscess/inf  - ID intermittently following through hospital course. Last seen 7/21.  - Continue ceftazidime, vancomycin for C.acnes, Pseudomonas, MRSA PJI until 8/1/2023 (completed)   - Start oral doxycycline and ciprofloxacin on 8/2/2023 through 9/13 (total 12 weeks therapy), intermittently refusing   - ID follow up ~8/21 (can be inpatient team if remains inpatient or can be outpatient)     Acute Kidney Injury / Acute renal failure - multifactorial, see above/below - improving   Nephrology consulted, appreciate recs. Baseline creatinine 1.0 or 1.1. Etiology: suspect combo of sepsis/inflammation, hypovolemia, blood loss anemia following surgery, vancomycin. UA recollected on 7/19 with granular casts suggesting ongoing dense ATN. Creatinine continuing to uptrend, however no acidosis, electrolyte abnormalities or evidence of hypervolemia. Some concern that uremia could be contributing to his encephalopathy. After GOC on 7/21, planned to pursue  short trial of dialysis.  - Nephrology consulted  - Initially planned for short trial of HD, has been deferred d/t renal recovery, HD line placed 8/1, continue to assess need for HD daily      Asymptomatic bacteruria (VRE)  UA obtained on 7/19 to assess for casts, reflexed to culture which is growing VRE. Labs and vitals with low concern for new infection. Discussed with ID who recommended against treatment.    Bilateral LE edema due to volume overload from HALEY and iatrogenic from blood transfusions  Hyperesthesia of bilateral lower extremities (no signs of cellulitis as of 7/13)  - Holding diuresis starting 7/20 while renal recovery ongoing     L heel pressure wound, unstagable  Noted earlier in pts hospitalization (~6/20), but at that time he refused assessment. Noted again by nursing to be worsening on 7/19 and WOCN consult was placed. Pt was agreeable and had wound assessed. He has been mostly non-agreeable to offloading or other preventative measures recommended by RN and WOCN, but will sometimes allow it with significant encouragement.  - WOCN consult  - Continue to encourage pt to utilize offloading techniques and to participate in wound cares.    Hypernatremia - resolved & recurrent   Uptrending recently, 147 on 7/18. Likely volume down. S/p D5W on 7/18, resolved. Elevated again and responded to D5W on 7/29, and again on 8/1  - Monitor.  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 for life, high risk of recurrent DVT given recent procedures and  illness/immobility  - iron and folic acid daily  - resumed Apixaban 8/2     S/p left hip explantation of left hip antibiotic spacer and revision of left total hip arthroplasty (5/26/2023)  - ortho performed washout 6/21  -Activity: 50% PWB LLE with posterior hip precautions [id number]  -AFO ordered for foot drop but patient refusing  -PT/OT  -Knee to be kept even in a foam leg elevator to relax sciatic nerve  -Pain: holding robaxin 4 times a day with mental status changes (discontinuing), APAP PRN  -stop tramadol (7/11) due to renal function  -oxycodone 2.5-5 mg q6h PRN- taking very minimal amts so unlikely to be contributing to mental status     Type 2 diabetes mellitus  - Last hemoglobin A1c 5.6% on 6/16/2023.  On glipizide, metformin, Actos, and Victoza prior to hospitalizations and surgery.   - continue ISS    Constipation, improved  -Continue Colace 100 mg twice daily  -Continue senna 1 tablet twice daily-- had briefly increased to 2 tab twice daily due to lack of stool, now improved  -Continue MiraLAX daily     Hypomagnesemia  -monitor and replete      Severe malnutrition in the context of acute on chronic illness  Goal for patient is to consume % of meals TID. Goal is 9172-1592 kcals/daily. Protein needs- 100-126 grams/daily.   -RD consult and appreciate recs  -regular diet  -Weekly weights, daily I/O     Adjustment disorder with depressed mood  Prolonged grief disorder  Patient has been with depressed mood in setting of ongoing health issues, as evidenced by lack of motivation to work with therapies during both TCU admissions.  Past history of passive suicidal statements. Psychiatry saw patient during hospital admission on 5/31 due to lack of motivation with therapies. Patient declined interest in starting any psychiatric medications. Was willing to engage with health psychology.   -Outpatient health psychology consult  -Continue to monitor for willingness to start antidepressant therapy     HTN:   -  "Started amlodipine 7/16, increased to 10mg 7/30    Stable and Resolved Issues:  Hyperlipidemia-continue PTA Lipitor 40 mg daily  JAIR-CPAP when sleeping     Diet: Renal Diet (non-dialysis)    DVT Prophylaxis: Heparin SQ  Tran Catheter: Not present  Lines: PRESENT      CVC Single Lumen Right Internal jugular Non - valved (open ended);Tunneled;Power injectable-Site Assessment: WDL;Positional  CVC Double Lumen Left Internal jugular Non - valved (open ended);Tunneled-Site Assessment: WDL      Cardiac Monitoring: None  Code Status: Full Code      Clinically Significant Risk Factors        # Hypokalemia: Lowest K = 3.1 mmol/L in last 2 days, will replace as needed   # Hypocalcemia: Lowest Ca = 8.4 mg/dL in last 2 days, will monitor and replace as appropriate   # Hypomagnesemia: Lowest Mg = 1.3 mg/dL in last 2 days, will replace as needed   # Hypoalbuminemia: Lowest albumin = 2.2 g/dL at 7/8/2023  8:51 AM, will monitor as appropriate                # Severe Obesity: Estimated body mass index is 42.51 kg/m  as calculated from the following:    Height as of this encounter: 1.778 m (5' 10\").    Weight as of this encounter: 134.4 kg (296 lb 4.8 oz).   # Severe Malnutrition: based on nutrition assessment           Disposition Plan           Jannie Gipson MD  Hospitalist Service, GOLD TEAM 21  [facility]  Securely message with [employer] (more info)  Text page via [facility] Paging/Directory   See signed in provider for up to date coverage information  ______________________________________________________________________    Interval History   No acute events overnight. No other concerns today. Patient intermittently refusing antibiotics and other cares, interventions. Denies fevers, chills, chest pain, SOB. Still having some jerking of extremities    Physical Exam   Vital Signs: Temp: 97.6  F (36.4  C) Temp src: Axillary BP: 131/73 Pulse: 96   Resp: 16 SpO2: 95 % O2 Device: None (Room " air)    Weight: 296 lbs 4.77 oz    General: laying in bed, conversing somewhat slowly   HEENT: NC/AT, MMM, oropharynx clear, anicteric sclera  CV: RRR, normal S1S2, no murmur, clicks, rubs appreciated  Resp: Non-labored respirations, CTAB  Abd: Soft, non-distended, non-tender  Extremities: wwp,  moving all extremities, + asterixis   Skin: No obvious rashes or lesions  Neuro: myoclonic jerking in all extremities       Medical Decision Making       40 MINUTES SPENT BY ME on the date of service doing chart review, history, exam, documentation & further activities per the note.      Data

## 2023-08-04 NOTE — PROGRESS NOTES
"Orthopedic Surgery Progress Note: 08/04/2023    Subjective:   NAEON. Reviewed RN and consultant notes. No HD initiation. Deferring some cares. GOC discussion with brother likely upcoming. Remains non-decisional.     Objective:   BP (!) 151/83 (BP Location: Left arm)   Pulse 87   Temp 97.9  F (36.6  C) (Axillary)   Resp 16   Ht 1.778 m (5' 10\")   Wt 134.4 kg (296 lb 6.4 oz)   SpO2 92%   BMI 42.53 kg/m    No intake/output data recorded.  General: NAD. Resting comfortably in bed.  Respiratory: Breathing comfortably on RA.  Musculoskeletal: Focused exam of the left lower extremity: incision well healed. Rook boot in place.    Laboratory Data:  Lab Results   Component Value Date    WBC 8.1 08/03/2023    HGB 8.5 (L) 08/03/2023     08/03/2023    INR 1.34 (H) 08/01/2023      Intraoperative cultures   6/21/2023: MRSA, C Acnes, Pseudomonas    Assessment & Plan:   Waldo Bustos is a 72 year old male status post left revision total hip arthroplasty on 5/26/2023 with Dr. Meyers now presenting with left hip prosthetic joint infection status post left hip irrigation and debridement on 6/21/2023 with Dr. Meyers.    Postoperatively noted to have confusion and increasing creatinine, sodium. Thorough workup by primary, Neuro, renal suggests metabolic encephalopathy. Possible dialysis this week, per renal.    Anemia postoperatively. Intermittent transfusions while hospitalized. Good response.    CRP downtrending 40> 30> 18 > 25. C acnes, MRSA in cultures from June. Treating with vanc/cefepime, per ID recs. Appreciate assistance.    Ortho will continue to follow every few days. Please do not hesitate to page with questions/concerns.     Ortho Plan:  - Activity: Up with assist until independent. Posterior hip precautions x 3 months (no flexion beyond 90 degrees, no adduction beyond midline, and no internal rotation beyond midline).  - Weightbearing Status: WBAT LLE.  - Pain Management: Transition from IV to PO as " tolerated.  - Antibiotics: Vancomycin and ceftazidime, per ID/primary.  - Diet: OK for a diet from an orthopedic perspective.  - DVT Prophylaxis: SCDs and PTA apixaban okay from an ortho perspective  - Imaging: XR hip to be obtained in mid-late August  - Labs: Labs PRN and per primary  - Bracing/Splinting: Abduction pillow or regular pillow while in bed.  - Dressings: Dressing removed. Okay to leave incision to air.  - Drains: GUERRERO drain removed 7/3  - Physical Therapy/Occupational Therapy: Evaluation and treatment. Signed off week of 7/12 due to patient lack of participation.  - Cultures: Intraoperative cultures 6/21/2023.  - Follow-up: Clinic with Dr. Meyers's team TBD (pending dispo plan). Will plan to get XR at that time.    - Disposition: TCU. Okay to discharge from an ortho perspective.     Hannah Starr MD, PGY-4  Orthopedics  Pager: [phone]

## 2023-08-04 NOTE — PLAN OF CARE
Pt is A&Ox4, intermittently confused. VSS. LS clear, on RA. BS active, LBM on 8/3/2023. Voiding well. Pt complains of pain but refused pain medications. Pt refused AM medications even after education provided. Pt has not been oob. R CVC is patent and SL. Continue to monitor.

## 2023-08-04 NOTE — PROGRESS NOTES
[facility] Nurse Inpatient Assessment     Consulted for: Left heel    7/25/2023: Attempted to see previously for buttocks. Patient continues to flatly refuse assessment for buttocks today. Per bedside RN, buttocks is red but blanchable, patient continues to be incontinent of urine and stool.       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  Postop course complicated by profound deconditioning.  He was admitted to TCU initially on 12/23/2022 for physical rehabilitation, however therapies no longer worked with patient after several months of an in bed therapy and his refusals to mobilize.  Underwent biopsy on 4/7/2023 without evidence of infection.  He was transferred back to [facility] for left total hip arthroplasty and reimplantation on 5/26/23 with Dr. Meyers.  Presented again to [facility] for persistent MRSA bacteremia on 6/17.     Assessment:      Areas visualized during today's visit: Lower extremities     Pressure Injury Location: Left heel    Last photo: 8/4 pt refused to have paste removed   Wound type: Pressure Injury     Pressure Injury Stage: Unstageable, hospital acquired, assessed with SANDRA Cook on 7/28  Wound history/plan of care:  Pt known to [facility] service. Pt known to decline cares and repositioning.   7/19 initial assessment: Spent approx 20 mins on negotiating how to move leg to be able to minimally assess wound. Pt agreed to see picture of wound. This writer explained wound to pt using the photo. Discussed with patient if he would like legs elevated on pillows or boots. He agreed to  pillows.    7/20: Assessed wound with Kristen Hadley RN CWOCN. Confirmed thick slough/eschar wound base. Again discussed keeping heels off bed with pt.  7/25/2023: Of note, patient has refused and continues to refuse heel off-loading boots on assessment today.  7/28: pt had heel lift boots in place  Wound base: 100 % slough/eschar     Palpation of the wound bed: boggy      Drainage: scant     Description of drainage: serosanguinous     Measurements (length x width x depth, in cm) 3 x 3 x 0.2 cm     Tunneling N/A     Undermining N/A  Periwound skin: Intact      Color: normal and consistent with surrounding tissue      Temperature: normal   Odor: none  Pain: severe, shooting and stabbing  Pain intervention prior to dressing change: slow and gentle cares   Treatment goal: Infection control/prevention and soften nonviable tissue  STATUS: evolving  Supplies ordered: discussed with RN and discussed with patient     Wound location: left buttock and gluteal cleft    Last photo: 8/4  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)  Wound history/plan of care: pt is incontinent and has previously refused assessment of area by WOC  Wound base: 100 % dermis to open areas on left buttock and base of gluteal cleft     Palpation of the wound bed: normal      Drainage: scant     Description of drainage: serosanguinous     Measurements (length x width x depth, in cm): see photo      Tunneling: N/A     Undermining: N/A  Periwound skin: Intact and Erythema- blanchable      Color: red      Temperature: normal   Odor: none  Pain: moderate, sharp- mostly related to left hip pain  Pain interventions prior to dressing change: slow and gentle cares   Treatment goal: Maintain (prevention of deterioration) and Protection  STATUS: initial assessment  Supplies ordered: at bedside, discussed with RN, and discussed with patient      Wound Location: Left medial thigh- no WOC consult     On assessment of patient bloody mucous noted on blue pad  and when cleaning buttocks no mucous noted from rectum but upon cleansing of perineal area there was an indurated area noted to left inner thigh with bloody pus-like drainage. Pointed out abscess to floor RN and left area BRII to drain.      Treatment Plan:     Bilateral buttocks and gluteal cleft wounds: BID and PRN with incontinence episodes  Cleanse wound with dry wipes and martell cleanse and protect spray.   Apply light layer of Triad paste (#294034) to wounds and red skin on bilateral buttocks  With repeat application, do not scrub the paste, only remove soiled paste and reapply.  If complete removal of paste is necessary use baby oil/mineral oil (#295706) and soft wash cloth.  Ensure pt has Isaac-cushion (#916244) while sitting up in the chair.  Use only one Covidien pad in between mattress and pt. No brief in bed.    Left heel wound(s): Every other day   Strongly encourage pt to elevate heels on pillows to float off bed surface at all times. (He agreed to this with WOC)  Cleanse wound with wound cleanser.   Apply light layer of Triad paste (#108277) to wound bed  Cut piece of adaptic to cover paste and wound  Secure with mepilex.  If patient agrees to heel off-loading boots, please apply.    Orders: Reviewed    RECOMMEND PRIMARY TEAM ORDER: None, at this time  Education provided: importance of repositioning, plan of care, wound progress and Infection prevention   Discussed plan of care with: Patient and Nurse  WOC nurse follow-up plan: weekly  Notify WOC if wound(s) deteriorate.  Nursing to notify the Provider(s) and re-consult the WOC Nurse if new skin concern.    DATA:     Current support surface: Bariatric Low air loss (BIN pump, Isolibrium, Pulsate, skin guard, etc)  Containment of urine/stool: Incontinence Protocol  BMI: Body mass index is 42.53 kg/m .   Active diet order: Orders Placed This Encounter      Renal Diet (non-dialysis)     Output: I/O last 3 completed shifts:  In: 150 [P.O.:150]  Out: 102  [Urine:102]     Labs:   Recent Labs   Lab 08/03/23  1356 08/02/23  0803 08/01/23  1022   ALBUMIN 3.3*   < > 3.1*   HGB 8.5*  --  8.8*   INR  --   --  1.34*   WBC 8.1  --  8.0    < > = values in this interval not displayed.       Pressure injury risk assessment:   Sensory Perception: 3-->slightly limited  Moisture: 2-->very moist  Activity: 1-->bedfast  Mobility: 2-->very limited  Nutrition: 1-->very poor  Friction and Shear: 1-->problem  Rafael Score: 10    Mona Horton RN CWOCN  Pager no longer is use, please contact through [facility] group: [facility] Nurse [facility]  Dept. Office Number: [phone]

## 2023-08-04 NOTE — PROGRESS NOTES
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  Postop course complicated by profound deconditioning.  He was admitted to TCU initially on 12/23/2022 for physical rehabilitation, however therapies no longer worked with patient after several months of an in bed therapy and his refusals to mobilize.  Underwent biopsy on 4/7/2023 without evidence of infection.  He was transferred back to [facility] for left total hip arthroplasty and reimplantation on 5/26/23 with Dr. Meyers.  Presented again to [place] for persistent MRSA bacteremia on 6/17. His course has been c/b HALEY. Baseline creat 1.0 as recent as 6/19/23).         Likely non oliguric HALEY stage II (UOP not recording), now improving  Recurrent HALEY in setting of significant hemodynamic changes including diuretics and Vancomycin use   Since his admission, have had multiple HALEY episodes. Again developed HALEY in July with creatinine peak of 6.05 on 7/25 and started to improve to 3.1 on 8/4 with eGFR of 20 ml, the best in the past month.  Mentation and myoclonic jerks stable/not improving with improvement in renal function/ BUN levels. Neurology and psych was consulted again with no big plans for his mentation.  - continue holding diuresis. No acute indication for iHD initiation today  - with given trajectory, hoping for continued renal function improvement but unclear if that would improve his mentation as it is not completely consistent with uremia to begin with.  - underwent catheter placement today by IR in anticipation of HD session, but with improved renal function, will hold off on dialysis for  "now. Likely to remove catheter provided stable/improving renal labs.     3. HTN - Borderline control in HALEY. No edema  Current regimen: Amlodipine 10 mg daily    - continue holding diuretic     4. MRSA bacteremia- Will be completing 6 wks of Vancomycin and ceftazidime 8/1/23. Then start po cipro and doxycycline on 8/2 for 6 weeks..    - UC with VRE but only 10-50 k. No treatment required   - Per ID     5. Electrolytes  Hypernatremia, improving with D5 water     6 Acid base - bicarb stable  - will monitor closely     7. BMD - Ca 8.3 Phos 4.2, albumin 3.1   - Malnutrition present: Getting MVI. Would benefit from dietary supplement   - No need for Phos binder at this time   - Vit D 16   - Began D3 - 50 mcq every day 7/18   - Switched to normal diet 7/26     8. Anemia - Hgb stable around 7-8   - Etiology acute illness   - On Iron, folic acid   - Iron studies 6/26/23: Ferritin 2616, Fe 30 IS 21   - Does have DVT history on Eliquis - switched to heparin   - Ferritin too high for IV iron   - Transfusions per primary team    Recommendations were communicated to primary team via this note and over phone.    Zayda Lucero MD   Division of Renal Disease and Hypertension  [facility]  eOriginal Web Console    Interval History :   Nursing and provider notes from last 24 hours reviewed.  In the last 24 hours Waldo Bustos been hemodynamically stable. No new symptoms today    Review of Systems:   Was not able to review because of change in mentation.    Physical Exam:   I/O last 3 completed shifts:  In: 150 [P.O.:150]  Out: 2 [Urine:2]   /73 (BP Location: Left arm)   Pulse 96   Temp 97.6  F (36.4  C) (Axillary)   Resp 16   Ht 1.778 m (5' 10\")   Wt 134.4 kg (296 lb 4.8 oz)   SpO2 95%   BMI 42.51 kg/m       General : Pt awake, not in acute distress   Lungs : anterior lung fields are clear  Cardiac : S1, S2 present  Abdomen : Soft/ND/NT  LE : no Edema noted  Dialysis Access : n/a    Labs:   All labs reviewed by " me  Electrolytes/Renal -   Recent Labs   Lab Test 08/04/23  1253 08/04/23  1125 08/04/23  0747 08/03/23  1636 08/03/23  1356 08/02/23  1147 08/02/23  0803 07/12/23  1209 07/12/23  0911 07/05/23  0823 07/05/23  0700     --   --   --  143  --  145   < > 143   < > 142   POTASSIUM 3.3*  --   --   --  3.1*  --  3.3*   < > 4.1   < > 4.5   CHLORIDE 107  --   --   --  106  --  108*   < > 112*   < > 108*   CO2 23  --   --   --  24  --  22   < > 19*   < > 16*   BUN 38.7*  --   --   --  39.6*  --  43.3*   < > 43.6*   < > 46.3*   CR 3.18*  --   --   --  3.33*  --  3.71*   < > 4.44*   < > 3.30*   * 95 94   < > 111*   < > 132*   < > 105*   < > 99   MAIKOL 8.4*  --   --   --  8.4*  --  8.3*   < > 8.3*   < > 7.7*   MAG 1.3*  --   --   --   --   --   --   --  1.6*  --  1.7   PHOS 4.5  --   --   --  4.2  --  4.2   < > 5.0*   < >  --     < > = values in this interval not displayed.       CBC -   Recent Labs   Lab Test 08/04/23  1253 08/03/23  1356 08/01/23  1022   WBC 9.1 8.1 8.0   HGB 8.3* 8.5* 8.8*    242 282       LFTs -   Recent Labs   Lab Test 08/04/23  1253 08/03/23  1356 08/02/23  0803 07/06/23  1708 07/06/23  0713 07/04/23  0627 06/22/23  0711 06/20/23  0704   ALKPHOS  --   --   --   --  103 99  --  93   BILITOTAL  --   --   --   --  <0.2 0.2  --  0.2   ALT  --   --   --   --  12 14  --  14   AST  --   --   --   --  9 11  --  14   PROTTOTAL  --   --   --   --  5.3* 5.9*  --  5.9*   ALBUMIN 3.2* 3.3* 3.1*   < > 2.4*  2.4* 2.6*   < > 2.3*    < > = values in this interval not displayed.       Iron Panel -   Recent Labs   Lab Test 06/26/23  0513   IRON 30*   IRONSAT 21   FADI 2,616*       Current Medications:   acetaminophen  975 mg Oral Q8H    amLODIPine  10 mg Oral Daily    apixaban ANTICOAGULANT  2.5 mg Oral BID    atorvastatin  40 mg Oral Daily    ciprofloxacin  500 mg Oral Q24H LUIS A    doxycycline hyclate  100 mg Oral Q12H [facility] (08/20)    ferrous sulfate  325 mg Oral Every Other Day    folic acid  1 mg Oral  Daily    heparin lock flush  5-20 mL Intracatheter Q24H    insulin aspart  1-7 Units Subcutaneous TID AC    insulin aspart  1-5 Units Subcutaneous At Bedtime    lactobacillus rhamnosus (GG)  1 capsule Oral BID    melatonin  3 mg Oral At Bedtime    miconazole   Topical BID    multivitamin w/minerals  1 tablet Oral Daily    senna-docusate  1 tablet Oral BID    sodium chloride (PF)  10-40 mL Intracatheter Q8H    thiamine  100 mg Oral Daily    cholecalciferol  50 mcg Oral Daily      sodium chloride 0.9%       Zayda Lucero MD

## 2023-08-04 NOTE — PLAN OF CARE
"    VS: BP (!) 151/83 (BP Location: Left arm)   Pulse 87   Temp 97.9  F (36.6  C) (Axillary)   Resp 16   Ht 1.778 m (5' 10\")   Wt 134.4 kg (296 lb 6.4 oz)   SpO2 92%   BMI 42.53 kg/m      O2: Sating >90% on RA. Lung sounds clear. Denies chest pain and SOB.   Output: Voids spontaneously and adequately, incontinent, brief and urinal use as well.    Last BM: Bowel sounds active x4. Passing flatus. LBM 8/4/23   Activity: Up with 2 assist, not out of bed this shift.   Skin: Redness in groin, buttock, heels wound   Pain: Pain was managed with Tylenol.    CMS: A&Ox1. Denies N/T.    Dressing: Dressing to daniella heels  C/D/I.   Diet: Regular. Appetite was good. Denies N/V.    LDA: PIV to R  is S/L. L and R CVC port.   Equipment: IV pole, FWW, gait belt, and personal belongings. Call light within reach and uses appropriately.   Plan: Pending dialysis   Additional Info: Continue POC.         "

## 2023-08-05 NOTE — PROGRESS NOTES
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  Postop course complicated by profound deconditioning.  He was admitted to TCU initially on 12/23/2022 for physical rehabilitation, however there was no progress with therapies after several months of in bed therapy and his refusals to mobilize. Underwent biopsy on 4/7/2023 without evidence of infection. He was transferred back to [facility] for left total hip arthroplasty and reimplantation on 5/26/23 with Dr. Meyers and was back in TCU Presented again to [place] for persistent MRSA bacteremia on 6/17 and has remained inpatient since that time. Ultimately has not been out of hospital or rehab since 11/22/22. Pt having ongoing encephalopathy and clearly lacks capacity, current though is 2/2 renal failure and uremia.     Today:   [facility] Update 8/5: Unfortunately, pt has not shown any significant improvement in terms of mentation, engagement in medical cares, willingness to participate in therapies, etc. While the hope was that a trial of HD may help Rahat, he ultimately had enough renal recover with Cr <3 and BUN <40 that nephrology felt very strongly that his symptoms were NOT c/w uremic encephalopathy and that a trial of HD was not indicated or helpful. At this point, no obvious further medical interventions to pursue, and need to repeat a GO discussion to determine next steps for Rahat. At this point, Rahat is not a candidate for a TCU given his inability to participate in PT. Is an assist of 2 and requires a  lift for transfers. Options likely include transition to a comfort based approach vs discharge to long term care.  When discussed with patient, he is able to participate in the conversation on a very surface level, but does not have a deep understanding of why he has not improved, or that his inability to consistently participate in cares impacts his recovery, or that his memory and cognitive function is altered. Will arrange a repeat GOC conference with Ivan Giang, ZEB, palliative care.   - Of note, patient intermittently declining his antibiotics (only getting about 50% of the time)   - Na up to 146 again, intermittent hypernatremia c/w poor oral intake, pt often refusing to eat. Would need to discuss with nutrition and determine if enteral nutrition appropriate if restorative cares pursued pending GOC. For now will resume D5W       Goals of Care  Complex medical course over the past 6+ months. Was in [place] [facility]CU since 12/2022 due to profound deconditioning, but ultimately refused to participate meaningfully in therapies. Was readmitted in 6/23 with MRSA bacteremia after repeat hip surgery in 5/2023. His hospital course has been complicated by acute renal failure, multifactorial encephalopathy and apathy/refusal to participate in therapies or medical cares. He was assessed by psychiatry on 7/18 and felt to be non-decisional regarding complex medical decisions or disposition. His brother (Ivan) is his legal POA, but Rahat does not have a healthcare directive. Care conference held on 7/21 with medicine, palliative, renal, ZEB, RN manager - discussed the above and options including a short trial (~2 weeks) of dialysis to see if it improves his mental function and willingness/ability to participate in therapies vs moving towards a comfort-based approach. Ultimately, Ivan decided to move forward with the trial of dialysis and a plan to reassess in ~2 weeks to determine next steps. We discussed that if he does not  meaningfully improve, outpatient dialysis would likely not be an option due to his profound immobility.  - Initially planned to move forward with short trial of dialysis, this was then held as nephro thought he was pending renal recovery.   - Tunneled HD line placed 8/1, will not yet start HD, continue to monitor per nephrology, continues to have renal recovery and no indications for HD   - See summary above, will need to arrange large group GOC again as above    Multifactorial encephalopathy - likely toxic metabolic, uremic, delirium, depression contributing  Noted to have acutely worsening mental status around ~7/4, which was initially felt to be related to cefepime neurotoxicity. Neurology consulted. EEG without seizures. CT head, ammonia and VBG wnl. Completed 5 days of antibiotics. No clinical or objective findings concerning for worsening infection. Did have worsening renal function and uremia, and so without clear other etiology discussion was that needed to rule out uremia definitively and agreed to pursue HD for toxin clearance and re-assessment of mentation. However, renal functin has improved with uremia down-trending with no improvement in mental status. This is superimposed on several months of refusal to participate in cares. Concern for underlying cognitive decline and uncontrolled depression.  - Delirium precautions  - Nephrology following, not planning to initiate HD   - Psychiatry consulted on 7/18  for capacity assessment - pt not decisional regarding complex medical decisions or disposition.  - Neurology and Psychiatry consulted 8/4   = Start Thiamine 100mg qday   = Start scheduled Melatonin 3mg at bedtime     MRSA bacteremia due to possible left hip septic arthritis vs other source - resolved   Blood cultures: 6/14, 6/15 + for MRSA (4/4), and 6/16 (1/2 GPCs) and 6/17 (2/2) GPCs. - started vancomycin 6/15.   6/17 sinovial fluid cultures with staph and klebsiella. Purulent drainage noted at  incision site at TCU, CT left hip concerning for infection (no mention of joint, mostly anterior in soft tissue) and couldn't exclude abscess/inf  - ID intermittently following through hospital course. Last seen 7/21.  - Continue ceftazidime, vancomycin for C.acnes, Pseudomonas, MRSA PJI until 8/1/2023 (completed)   - Start oral doxycycline and ciprofloxacin on 8/2/2023 through 9/13 (total 12 weeks therapy), intermittently refusing, getting about 50%   - ID follow up ~8/21 (can be inpatient team if remains inpatient or can be outpatient)     Acute Kidney Injury / Acute renal failure - multifactorial, see above/below - improving   Nephrology consulted, appreciate recs. Baseline creatinine 1.0 or 1.1. Etiology: suspect combo of sepsis/inflammation, hypovolemia, blood loss anemia following surgery, vancomycin. UA recollected on 7/19 with granular casts suggesting ongoing dense ATN. Creatinine continuing to uptrend, however no acidosis, electrolyte abnormalities or evidence of hypervolemia. Some concern that uremia could be contributing to his encephalopathy. After GOC on 7/21, planned to pursue short trial of dialysis.  - Nephrology consulted  - Initially planned for short trial of HD, has been deferred d/t renal recovery, HD line placed 8/1, continue to assess need for HD daily. Likely will have line removed 8/7 if Cr and BUN continue to improve      Asymptomatic bacteruria (VRE)  UA obtained on 7/19 to assess for casts, reflexed to culture which is growing VRE. Labs and vitals with low concern for new infection. Discussed with ID who recommended against treatment.    Bilateral LE edema due to volume overload from HALEY and iatrogenic from blood transfusions  Hyperesthesia of bilateral lower extremities (no signs of cellulitis as of 7/13)  - Holding diuresis starting 7/20 while renal recovery ongoing     L heel pressure wound, unstagable  Noted earlier in pts hospitalization (~6/20), but at that time he refused  assessment. Noted again by nursing to be worsening on 7/19 and WOCN consult was placed. Pt was agreeable and had wound assessed. He has been mostly non-agreeable to offloading or other preventative measures recommended by RN and WOCN, but will sometimes allow it with significant encouragement.  - WOCN consult  - Continue to encourage pt to utilize offloading techniques and to participate in wound cares.    Hypernatremia - resolved & recurrent   Uptrending recently, 147 on 7/18. Likely volume down. S/p D5W on 7/18, resolved. Elevated again and responded to D5W on 7/29, and again on 8/1  - Monitor.  - Nephro following  - D5W for free water deficits as needed -> resumed 75mL/hour on 8/1, discontinued 8/2, restarted 8/5  - Encourage PO intake     Hypoxemic Respiratory Failure due to immobility, surgery - resolved  - continue to follow; encourage IS    Acute blood loss anemia following surgery, complicated by use of blood thinner  - 3 units day after surgery did joint washout (6/22) for acute blood loss anemia, 1 unit 6/25  - watch for bleeding; hold apixaban if need be - had unprovoked dvt in 2018 and would be on eliquis for life, high risk of recurrent DVT given recent procedures and illness/immobility  - iron and folic acid daily  - resumed Apixaban 8/2     S/p left hip explantation of left hip antibiotic spacer and revision of left total hip arthroplasty (5/26/2023)  - ortho performed washout 6/21  -Activity: 50% PWB LLE with posterior hip precautions [id number]  -AFO ordered for foot drop but patient refusing  -PT/OT  -Knee to be kept even in a foam leg elevator to relax sciatic nerve  -Pain: holding robaxin 4 times a day with mental status changes (discontinuing), APAP PRN  -stop tramadol (7/11) due to renal function  -oxycodone 2.5-5 mg q6h PRN- taking very minimal amts so unlikely to be contributing to mental status     Type 2 diabetes mellitus  - Last hemoglobin A1c 5.6% on 6/16/2023.  On glipizide, metformin,  Actos, and Victoza prior to hospitalizations and surgery.   - continue ISS    Constipation, improved  -Continue Colace 100 mg twice daily  -Continue senna 1 tablet twice daily-- had briefly increased to 2 tab twice daily due to lack of stool, now improved  -Continue MiraLAX daily     Hypomagnesemia  -monitor and replete      Severe malnutrition in the context of acute on chronic illness  Goal for patient is to consume % of meals TID. Goal is 7239-7604 kcals/daily. Protein needs- 100-126 grams/daily.   -RD consult and appreciate recs  -regular diet  -Weekly weights, daily I/O     Adjustment disorder with depressed mood  Prolonged grief disorder  Patient has been with depressed mood in setting of ongoing health issues, as evidenced by lack of motivation to work with therapies during both TCU admissions.  Past history of passive suicidal statements. Psychiatry saw patient during hospital admission on 5/31 due to lack of motivation with therapies. Patient declined interest in starting any psychiatric medications. Was willing to engage with health psychology.   -Outpatient health psychology consult  -Continue to monitor for willingness to start antidepressant therapy     HTN:   - Started amlodipine 7/16, increased to 10mg 7/30    Stable and Resolved Issues:  Hyperlipidemia-continue PTA Lipitor 40 mg daily  JAIR-CPAP when sleeping     Diet: Renal Diet (non-dialysis)    DVT Prophylaxis: Heparin SQ  Tran Catheter: Not present  Lines: PRESENT      CVC Single Lumen Right Internal jugular Non - valved (open ended);Tunneled;Power injectable-Site Assessment: WDL  CVC Double Lumen Left Internal jugular Non - valved (open ended);Tunneled-Site Assessment: WDL      Cardiac Monitoring: None  Code Status: Full Code      Clinically Significant Risk Factors        # Hypokalemia: Lowest K = 3.1 mmol/L in last 2 days, will replace as needed   # Hypocalcemia: Lowest Ca = 8.4 mg/dL in last 2 days, will monitor and replace as  "appropriate   # Hypomagnesemia: Lowest Mg = 1.3 mg/dL in last 2 days, will replace as needed   # Hypoalbuminemia: Lowest albumin = 2.2 g/dL at 7/8/2023  8:51 AM, will monitor as appropriate                # Severe Obesity: Estimated body mass index is 42.51 kg/m  as calculated from the following:    Height as of this encounter: 1.778 m (5' 10\").    Weight as of this encounter: 134.4 kg (296 lb 4.8 oz).   # Severe Malnutrition: based on nutrition assessment           Disposition Plan           Jannie Gipson MD  Hospitalist Service, GOLD TEAM 21  [facility]  Securely message with [employer] (more info)  Text page via [facility] Paging/Directory   See signed in provider for up to date coverage information  ______________________________________________________________________    Interval History   No acute events overnight. Patient states he has ongoing hip and right foot discomfort, but when I asked him if this was new, he told me a story about how he had been back and forth to different places in the hospital. I asked him about not taking his medications, and tells me that wasn't true, but that sometimes he just doesn't want to. We talked briefly about options given that he does not seem to be improving from a mentation or physical strength status. Initially he made a motion like cutting across his throat, and when I asked him to explain what he meant he said \"you know what I mean.\" I asked him directly if he wanted to stop taking medicines and getting labs and being in a hospital and transition to his comfort, and he said, \"I don't know.\" No other concerns relayed today. Encouraged him to drink water.     Physical Exam   Vital Signs: Temp: 97.8  F (36.6  C) Temp src: Oral BP: (!) 169/64 Pulse: 84   Resp: 16 SpO2: 97 % O2 Device: None (Room air)    Weight: 296 lbs 4.77 oz    General: laying in bed, conversing somewhat slowly   HEENT: NC/AT, MMM, oropharynx clear, anicteric " sclera  CV: RRR, normal S1S2, no murmur, clicks, rubs appreciated  Resp: Non-labored respirations, CTAB  Abd: Soft, non-distended, non-tender  Extremities: wwp,  moving all extremities, + asterixis   Skin: No obvious rashes or lesions, rook boots in place on extremeities   Neuro: myoclonic jerking in all extremities, improved from prior       Medical Decision Making       40 MINUTES SPENT BY ME on the date of service doing chart review, history, exam, documentation & further activities per the note.      Data

## 2023-08-05 NOTE — PLAN OF CARE
Pt is A&Ox4, intermittently confused. VSS. LS clear, on RA. BS active, LBM on 8/5/2023. Voiding well. Pt complains of pain but refused pain medications. Pt refused some AM medications and BG checks Pt has no desire to eat food today. AM BG was 104. Dextrose infusing at 75ml/hr in R port. Pt has not been oob. L CVC is patent and SL. Continue to monitor.

## 2023-08-05 NOTE — PROGRESS NOTES
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 on 11/22/2022 for scheduled explant of left hip prosthesis, debridement, and reconstruction with antibiotic spacer.  Postop course complicated by profound deconditioning.  He was admitted to TCU initially on 12/23/2022 for physical rehabilitation, however therapies no longer worked with patient after several months of in bed therapy and his refusals to mobilize.  Underwent biopsy on 4/7/2023 without evidence of infection.  He was transferred back to [facility] for left total hip arthroplasty and reimplantation on 5/26/23 with Dr. Meyers.  Presented again to [place] for persistent MRSA bacteremia on 6/17. His course has been c/b HALEY. Baseline creat 1.0 as recent as 6/19/23).         1-Recurrent non-oliguric HALEY in setting of significant hemodynamic changes including diuretics and Vancomycin use, now improving  Since his admission, have had multiple HALEY episodes. Again developed HALEY in July with creatinine peak of 6.05 on 7/25 and started to improve to 3.1 on 8/4 with eGFR of 20 ml, the best in the past month.  Mentation and myoclonic jerks stable/not improving with improvement in renal function/ BUN levels. Neurology and psych was consulted again with no big plans for his mentation.  - continue holding diuresis. No acute indication for iHD initiation today as his creatinine continues to improve.   - with given trajectory, hoping for continued renal function improvement but unclear if that would improve his mentation as it is not completely consistent with uremia to begin with.  - underwent catheter placement 8/4 by IR in anticipation of HD session, but with improved renal function, will hold off on dialysis for  "now. Likely to remove catheter provided stable/improving renal labs.     2. HTN - Borderline control in HALEY. No edema  Current regimen: Amlodipine 10 mg daily    - continue holding diuretic     3. MRSA bacteremia- s/p 6 wks of Vancomycin and ceftazidime 8/1/23. Now on po cipro and doxycycline on 8/2 for 6 weeks..    - UC with VRE but only 10-50 k. No treatment required   - Per ID     4. Electrolytes  Hypernatremia, improving with D5 water     5 Acid base - bicarb stable  - will monitor closely     6. BMD - Ca 8.3 Phos 4.2, albumin 3.1   - Malnutrition present: Getting MVI. Would benefit from dietary supplement   - No need for Phos binder at this time   - Vit D 16   - Began D3 - 50 mcq every day 7/18   - Switched to normal diet 7/26     8. Anemia - Hgb stable around 7-8   - Etiology acute illness   - On Iron, folic acid   - Iron studies 6/26/23: Ferritin 2616, Fe 30 IS 21   - Does have DVT history on Eliquis - switched to heparin   - Ferritin too high for IV iron   - Transfusions per primary team    Recommendations were communicated to primary team via this note    FLO JENKINS MD   Division of Renal Disease and Hypertension  [facility]  myairmail  Vocera Web Console    Interval History :   Nursing and provider notes from last 24 hours reviewed.  In the last 24 hours Waldo Bustos been hemodynamically stable. No new symptoms today    Review of Systems:   Was not able to review because of change in mentation.    Physical Exam:   I/O last 3 completed shifts:  In: -   Out: 150 [Urine:150]   BP (!) 169/64 (BP Location: Left arm)   Pulse 84   Temp 97.8  F (36.6  C) (Oral)   Resp 16   Ht 1.778 m (5' 10\")   Wt 134.4 kg (296 lb 4.8 oz)   SpO2 97%   BMI 42.51 kg/m       General : Pt awake, not in acute distress   Lungs : anterior lung fields are clear  Cardiac : S1, S2 present  Abdomen : Soft/ND/NT  LE : no Edema noted  Dialysis Access : n/a    Labs:   All labs reviewed by me  Electrolytes/Renal -   Recent Labs   Lab " Test 08/05/23  1111 08/05/23  1024 08/05/23  0744 08/05/23  0141 08/04/23  2234 08/04/23  1847 08/04/23  1823 08/04/23  1253 08/03/23  1636 08/03/23  1356   NA  --   --  146*  --   --   --   --  144  --  143   POTASSIUM 3.3*  --  3.4  --   --  3.4  --  3.3*  --  3.1*   CHLORIDE  --   --  109*  --   --   --   --  107  --  106   CO2  --   --  24  --   --   --   --  23  --  24   BUN  --   --  39.8*  --   --   --   --  38.7*  --  39.6*   CR  --   --  2.98*  --   --   --   --  3.18*  --  3.33*   GLC  --  104* 96 90   < >  --    < > 100*   < > 111*   MAIKOL  --   --  8.4*  --   --   --   --  8.4*  --  8.4*   MAG  --   --  1.8  --   --  1.9  --  1.3*  --   --    PHOS  --   --  4.1  --   --   --   --  4.5  --  4.2    < > = values in this interval not displayed.       CBC -   Recent Labs   Lab Test 08/05/23  0744 08/04/23  1253 08/03/23  1356   WBC 7.9 9.1 8.1   HGB 8.5* 8.3* 8.5*    274 242       LFTs -   Recent Labs   Lab Test 08/05/23  0744 08/04/23  1253 08/03/23  1356 07/06/23  1708 07/06/23  0713 07/04/23  0627 06/22/23  0711 06/20/23  0704   ALKPHOS  --   --   --   --  103 99  --  93   BILITOTAL  --   --   --   --  <0.2 0.2  --  0.2   ALT  --   --   --   --  12 14  --  14   AST  --   --   --   --  9 11  --  14   PROTTOTAL  --   --   --   --  5.3* 5.9*  --  5.9*   ALBUMIN 3.2* 3.2* 3.3*   < > 2.4*  2.4* 2.6*   < > 2.3*    < > = values in this interval not displayed.       Iron Panel -   Recent Labs   Lab Test 06/26/23  0513   IRON 30*   IRONSAT 21   FADI 2,616*       Current Medications:   acetaminophen  975 mg Oral Q8H    amLODIPine  10 mg Oral Daily    apixaban ANTICOAGULANT  2.5 mg Oral BID    atorvastatin  40 mg Oral Daily    ciprofloxacin  500 mg Oral Q24H LUIS A    doxycycline hyclate  100 mg Oral Q12H LUIS A (08/20)    ferrous sulfate  325 mg Oral Every Other Day    folic acid  1 mg Oral Daily    heparin lock flush  5-20 mL Intracatheter Q24H    insulin aspart  1-7 Units Subcutaneous TID AC    insulin aspart  1-5  Units Subcutaneous At Bedtime    lactobacillus rhamnosus (GG)  1 capsule Oral BID    melatonin  3 mg Oral At Bedtime    miconazole   Topical BID    multivitamin w/minerals  1 tablet Oral Daily    senna-docusate  1 tablet Oral BID    sodium chloride (PF)  10-40 mL Intracatheter Q8H    thiamine  100 mg Oral Daily    cholecalciferol  50 mcg Oral Daily      D5W 75 mL/hr at 08/05/23 1026    sodium chloride 0.9%       FLO JENKINS MD

## 2023-08-05 NOTE — PLAN OF CARE
"  VS: BP (!) 150/76 (BP Location: Left arm)   Pulse 91   Temp 97.8  F (36.6  C) (Oral)   Resp 16   Ht 1.778 m (5' 10\")   Wt 134.4 kg (296 lb 4.8 oz)   SpO2 95%   BMI 42.51 kg/m      O2: Stable on room air>90%   Output: Incontinent of bowel and bladder   Last BM: 8/1/23   Activity: Not OOB, Assist of 2 with cares and transfers with lift device   Skin: Left hip incision-healing  Left heel pressure ulcer  Blanchable redness to IT and groin area and bruise to left upper arm   Pain: Managed with scheduled Tylenol    CMS: Intermittent confusion. AXO X1 self   Dressing: L hip- open to air  Left heel mepilex    Diet: Renal diet   BG checks 95JA=551, 2AM =90   LDA: Right CVC single lumen  Left double lumen    Equipment: Pt belongings, IV pole   Plan: Count with POC   Additional Info: Pt has been cooperative with cares and repositioning in bed during this shift                            "

## 2023-08-06 NOTE — PLAN OF CARE
Goal Outcome Evaluation:    Pt A&Ox4, intermittently confused. Pt refused cares, didn't answer questions, and refused BG check. Writer had pt from 1500 to 1930. Pt did not eat dinner. Pt not oob. Continue to monitor.

## 2023-08-06 NOTE — PLAN OF CARE
Goal Outcome Evaluation:    Pt alert, disoriented to: time, situation, place. Pt often refuses cares and medications. Today pt agreed to meds, but dropped several and did not take them later. Pt allowed one BG check this am, did not allow another. Pt had BM x1 this shift, used bed pan, incontinence care performed. Ax2 liko lift, Ax2-3 for boosting and turns. MD had care conference with pt's brother and pt today, medications adjusted. Potassium replaced previous shift. Dextrose complete, discontinued this shift.    Pt skin covered in scattered abrasions and bruises. Sacrum, coccyx, buttocks all very red, cracked, peeling, some areas bleeding; incontinence care performed, wound cleanser applied. CVCs non tunneled, heparin locked. UTV heel wounds, groin wound.    Pt not OOB, but put shorts on with Ax2 and helped boost self in bed. Pt very motivated today and in good mood most of shift. Pt wanted to move to edge of bed and stand up, but pt not physically able to. Pt reoriented to situation. Pt not convinced of situation, but redirected.    VS: Temp: 98  F (36.7  C) Temp src: Oral BP: 134/54 Pulse: 80   Resp: 16 SpO2: 95 % O2 Device: None (Room air)

## 2023-08-06 NOTE — PROGRESS NOTES
Brief Nephrology Note:    Creatinine is slowly decreasing. If creatinine continues to be stable or declines further, then he needs his HD catheter removed.    Lauren Mi MD   Department of Medicine   Division of Renal Disease and Hypertension

## 2023-08-06 NOTE — PROGRESS NOTES
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  Postop course complicated by profound deconditioning.  He was admitted to TCU initially on 12/23/2022 for physical rehabilitation, however there was no progress with therapies after several months of in bed therapy and his refusals to mobilize. Underwent biopsy on 4/7/2023 without evidence of infection. He was transferred back to [facility] for left total hip arthroplasty and reimplantation on 5/26/23 with Dr. Meyers and was back in TCU Presented again to [place] for persistent MRSA bacteremia on 6/17 and has remained inpatient since that time. Ultimately has not been out of hospital or rehab since 11/22/22. Pt having ongoing encephalopathy and clearly lacks capacity, current though is 2/2 renal failure and uremia.     Today:  Long discussion with patient's HC POA, his brother Ivan, via telephone 8/5. Ivan will be coming in person 8/6, but has not yet arrived. Ivan's perception is that Rahat's mentation has improved and that he is doing better with respect to his ability to converse appropriately, understand what is going on. Ivan states that based on his discussions with Rahat, Rahat does not want to transition to comfort cares and wants to try and get better to come home., Unfortunately, that is an unrealistic goal for the near future. We discussed that this has been the goal for ~8+ months but that recent challenges aside with encephalopathy, renal dysfunction, etc, Rahat has not made meaningful progress  in his recovery due to lack of participation in therapies. We also discussed that despite encouragement, Rahat continues to refuse medications, nursing cares, vital signs, etc somewhat regularly. I discussed with Ivan that for Rahat to ever be able to return home, he would need to participate with all medical cares and requires full participation in extensive therapy and be discharged to a rehab facility. Once at a rehab facility if one was willing to accept him, Rahat would need to continue to participate in all cares and therapy, and even in that case would likely be weeks to months before Rahat recovered enough to return home even if everything went perfectly and he had no medical setbacks, which is unlikely. If Rahat is unable to participate in cares and therapy, the alternatives are to transition our focus to a comfort based approach (I.e. hospice) or else consider discharge to Long Term Care. Ivan is understanding of these options and wants to speak to Rahat with physician present at bedside. We made plans to do this today 8/6, but not yet arrived. Ultimately, ongoing discussions about discharge planning need to continue, and recommend one of 3 options:  1) Continue with restorative cares with goal of discharging back to rehab facility and ultimately home. This would require full participation in all cares, adherence with taking all medications, and re-engagement with PT. Also, patient not taking much PO, so requires re-engagement of nutrition, calorie counts, and may also require enteral nutrition in order to promote healing as I suspct patient's caloric intake is not sufficient to allow healing   2) Continue with restorative cares with recognition that Rahat is not able or willing to participate in rehabilitation and will not be able to discharge to TCU or home, and pursue discharge to a long term care facility  3) Transition focus to comfort based approach, consider enrollment in hospice, with dispo options to be  "determined at that time      Update @1600: Able to have this discussion in person with Rahat and Ivan, Rahat was able to summarize fairly accurately what has been going on, and states very definitively that he wants to live, and he doesn't want to be a \"bump on a log.\" He states he knows he has to better participate in cares, and do physical therapy, and that he wants to go back to rehab facility if possible because his goal is to get home. He is very coherent today, speaking in full sentences, and seems to confirm understanding of the discussion.  Ivan is in agreement with plan. I actually think he may have capacity again, and patient open to being seen again by psych. With this in mind:   = Nutrition consult placed   = Repeat PT and OT orders placed   = BG checks to BID   = Please page MD if patient repeatedly refusing pertinent meds or cares   = Goal to have re-eval by PT and OT and re-referral to TCU   = Repeat psych consult   - Na improved to 141 with D5W, discontinue; recurrent need for free water consistent with poor PO intake  - Nephrology following, if renal labs stable to improved 8/7, likely need to arrange to remove tunneled HD line       Multifactorial encephalopathy - likely toxic metabolic, uremic, delirium, depression contributing - slowly improving   Noted to have acutely worsening mental status around ~7/4, which was initially felt to be related to cefepime neurotoxicity. Neurology consulted. EEG without seizures. CT head, ammonia and VBG wnl. Completed 5 days of antibiotics. No clinical or objective findings concerning for worsening infection. Did have worsening renal function and uremia, and so without clear other etiology discussion was that needed to rule out uremia definitively and agreed to pursue HD for toxin clearance and re-assessment of mentation. However, renal functin has improved with uremia down-trending with no improvement in mental status. This is superimposed on several months of " refusal to participate in cares. Concern for underlying cognitive decline and uncontrolled depression.  - Delirium precautions  - Nephrology following, not planning to initiate HD   - Psychiatry consulted on 7/18  for capacity assessment - pt not decisional regarding complex medical decisions or disposition, if he continues to improve from a mental status standpoint, consider psych re-assessment   - Neurology and Psychiatry consulted 8/4   = Start Thiamine 100mg qday   = Start scheduled Melatonin 3mg at bedtime     MRSA bacteremia due to possible left hip septic arthritis vs other source - resolved   Blood cultures: 6/14, 6/15 + for MRSA (4/4), and 6/16 (1/2 GPCs) and 6/17 (2/2) GPCs. - started vancomycin 6/15.   6/17 sinovial fluid cultures with staph and klebsiella. Purulent drainage noted at incision site at TCU, CT left hip concerning for infection (no mention of joint, mostly anterior in soft tissue) and couldn't exclude abscess/inf  - ID intermittently following through hospital course. Last seen 7/21.  - Continue ceftazidime, vancomycin for C.acnes, Pseudomonas, MRSA PJI until 8/1/2023 (completed)   - Start oral doxycycline and ciprofloxacin on 8/2/2023 through 9/13 (total 12 weeks therapy), intermittently refusing, getting about 50%   - ID follow up ~8/21 (can be inpatient team if remains inpatient or can be outpatient)     Acute Kidney Injury / Acute renal failure - multifactorial, see above/below - improving   Nephrology consulted, appreciate recs. Baseline creatinine 1.0 or 1.1. Etiology: suspect combo of sepsis/inflammation, hypovolemia, blood loss anemia following surgery, vancomycin. UA recollected on 7/19 with granular casts suggesting ongoing dense ATN. Creatinine continuing to uptrend, however no acidosis, electrolyte abnormalities or evidence of hypervolemia. Some concern that uremia could be contributing to his encephalopathy. After GOC on 7/21, planned to pursue short trial of dialysis.  -  Nephrology consulted  - Initially planned for short trial of HD, has been deferred d/t renal recovery, HD line placed 8/1, continue to assess need for HD daily. Likely will have line removed week of 8/7 if Cr and BUN continue to improve      Asymptomatic bacteruria (VRE)  UA obtained on 7/19 to assess for casts, reflexed to culture which is growing VRE. Labs and vitals with low concern for new infection. Discussed with ID who recommended against treatment.    Bilateral LE edema due to volume overload from HALEY and iatrogenic from blood transfusions  Hyperesthesia of bilateral lower extremities (no signs of cellulitis as of 7/13)  - Holding diuresis starting 7/20 while renal recovery ongoing     L heel pressure wound, unstagable  Noted earlier in pts hospitalization (~6/20), but at that time he refused assessment. Noted again by nursing to be worsening on 7/19 and WOCN consult was placed. Pt was agreeable and had wound assessed. He has been mostly non-agreeable to offloading or other preventative measures recommended by RN and WOCN, but will sometimes allow it with significant encouragement.  - WOCN consult  - Continue to encourage pt to utilize offloading techniques and to participate in wound cares.    Hypernatremia - resolved & recurrent   Uptrending recently, 147 on 7/18. Likely volume down. S/p D5W on 7/18, resolved. Elevated again and responded to D5W on 7/29, and again on 8/1  - Monitor.  - Nephro following  - D5W for free water deficits as needed -> resumed 75mL/hour on 8/1, discontinued 8/2, restarted 8/5  - Encourage PO intake     Hypoxemic Respiratory Failure due to immobility, surgery - resolved  - continue to follow; encourage IS    Acute blood loss anemia following surgery, complicated by use of blood thinner  - 3 units day after surgery did joint washout (6/22) for acute blood loss anemia, 1 unit 6/25  - watch for bleeding; hold apixaban if need be - had unprovoked dvt in 2018 and would be on eliquis  for life, high risk of recurrent DVT given recent procedures and illness/immobility  - iron and folic acid daily  - resumed Apixaban 8/2     S/p left hip explantation of left hip antibiotic spacer and revision of left total hip arthroplasty (5/26/2023)  - ortho performed washout 6/21  -Activity: 50% PWB LLE with posterior hip precautions [id number]  -AFO ordered for foot drop but patient refusing  -PT/OT  -Knee to be kept even in a foam leg elevator to relax sciatic nerve  -Pain: holding robaxin 4 times a day with mental status changes (discontinuing), APAP PRN  -stop tramadol (7/11) due to renal function  -oxycodone 2.5-5 mg q6h PRN- taking very minimal amts so unlikely to be contributing to mental status     Type 2 diabetes mellitus  - Last hemoglobin A1c 5.6% on 6/16/2023.  On glipizide, metformin, Actos, and Victoza prior to hospitalizations and surgery.   - continue ISS    Constipation, improved  -Continue Colace 100 mg twice daily  -Continue senna 1 tablet twice daily-- had briefly increased to 2 tab twice daily due to lack of stool, now improved  -Continue MiraLAX daily     Hypomagnesemia  -monitor and replete      Severe malnutrition in the context of acute on chronic illness  Goal for patient is to consume % of meals TID. Goal is 5280-8353 kcals/daily. Protein needs- 100-126 grams/daily.   -RD consult and appreciate recs  -regular diet  -Weekly weights, daily I/O     Adjustment disorder with depressed mood  Prolonged grief disorder  Patient has been with depressed mood in setting of ongoing health issues, as evidenced by lack of motivation to work with therapies during both TCU admissions.  Past history of passive suicidal statements. Psychiatry saw patient during hospital admission on 5/31 due to lack of motivation with therapies. Patient declined interest in starting any psychiatric medications. Was willing to engage with health psychology.   -Continue to monitor for willingness to start  "antidepressant therapy, consider psych re-consult on 8/7     HTN:   - Started amlodipine 7/16, increased to 10mg 7/30    Stable and Resolved Issues:  Hyperlipidemia-continue PTA Lipitor 40 mg daily  JAIR-CPAP when sleeping     Diet: Renal Diet (non-dialysis)    DVT Prophylaxis: Heparin SQ  Tran Catheter: Not present  Lines: PRESENT      CVC Single Lumen Right Internal jugular Non - valved (open ended);Tunneled;Power injectable-Site Assessment: WDL  CVC Double Lumen Left Internal jugular Non - valved (open ended);Tunneled-Site Assessment: WDL      Cardiac Monitoring: None  Code Status: Full Code      Clinically Significant Risk Factors        # Hypokalemia: Lowest K = 3.3 mmol/L in last 2 days, will replace as needed  # Hypernatremia: Highest Na = 146 mmol/L in last 2 days, will monitor as appropriate  # Hypocalcemia: Lowest Ca = 8.4 mg/dL in last 2 days, will monitor and replace as appropriate   # Hypomagnesemia: Lowest Mg = 1.3 mg/dL in last 2 days, will replace as needed   # Hypoalbuminemia: Lowest albumin = 2.2 g/dL at 7/8/2023  8:51 AM, will monitor as appropriate                # Severe Obesity: Estimated body mass index is 42.51 kg/m  as calculated from the following:    Height as of this encounter: 1.778 m (5' 10\").    Weight as of this encounter: 134.4 kg (296 lb 4.8 oz).   # Severe Malnutrition: based on nutrition assessment           Disposition Plan           Jannie Gipson MD  Hospitalist Service, GOLD TEAM 21  [facility]  Securely message with [employer] (more info)  Text page via [employer] Paging/Directory   See signed in provider for up to date coverage information  ______________________________________________________________________    Interval History   No acute events overnight. Patient was sleepy when I visited him this am. Denied any focal concerns, minimal pain in left foot, otherwise none. Denied fevers, chlls, chest pain, SOB, felt his jerking arms " were getting better. When I emphasized to him the importance of fully participating in his cares he confirmed understanding.     Physical Exam   Vital Signs: Temp: 98  F (36.7  C) Temp src: Oral BP: (!) 149/67 Pulse: 78   Resp: 16 SpO2: 96 % O2 Device: None (Room air)    Weight: 296 lbs 4.77 oz    General: laying in bed, conversing somewhat slowly but able to speak in complete sentences, most of what he said was appropriate for the context of the situation   HEENT: NC/AT, MMM, oropharynx clear, anicteric sclera  CV: RRR, normal S1S2, no murmur, clicks, rubs appreciated  Resp: Non-labored respirations, CTAB  Abd: Soft, non-distended, non-tender  Extremities: wwp,  moving all extremities, + asterixis   Skin: No obvious rashes or lesions, rook boots in place on extremeities   Neuro: myoclonic jerking in all extremities, improved from prior       Medical Decision Making       50 MINUTES SPENT BY ME on the date of service doing chart review, history, exam, documentation & further activities per the note.      Data

## 2023-08-06 NOTE — PLAN OF CARE
"  VS: /59 (BP Location: Left arm)   Pulse 81   Temp 98.6  F (37  C) (Oral)   Resp 16   Ht 1.778 m (5' 10\")   Wt 134.4 kg (296 lb 4.8 oz)   SpO2 93%   BMI 42.51 kg/m       O2: Stable on room air>90%. Denies SOB or chest pain    Output: Incontinent of bowel and bladder   Last BM: 8/5/23- bowel sounds active   Activity: Not OOB, Assist of 2 with cares and transfers with lift device   Skin: Left hip incision-healing  Left heel pressure ulcer  Blanchable redness to IT and groin area and bruise to left upper arm  Scabs to BLE   Pain: Managed with scheduled Tylenol    CMS: Intermittent confusion. AXO self and situation    Dressing: L hip- open to air  Left heel mepilex    Diet: Renal diet   BG checks 00HK=972  2AM =126   LDA: Right CVC single lumen infusing Dextrose at 75ml/hr  Left double lumen    Equipment: Pt belongings, IV pole and pump   Plan: Count with POC  RN Managed MG-WDL  RN managed K+- Replaced at 0500 =. Recheck lab draw at 0900 today   Additional Info: Pt has been cooperative with cares and repositioning in bed during this shift.                            "

## 2023-08-07 NOTE — PLAN OF CARE
"  VS: BP (!) 142/71 (BP Location: Right arm, Patient Position: Semi-Fuentes's)   Pulse 85   Temp 97.5  F (36.4  C) (Oral)   Resp 16   Ht 1.778 m (5' 10\")   Wt 134.4 kg (296 lb 4.8 oz)   SpO2 94%   BMI 42.51 kg/m       O2: Stable on room air>90%. Denies SOB or chest pain    Output: Incontinent of bowel    Last BM: 8/6/23- bowel sounds active   Activity: Not OOB, Assist of 2 with cares and transfers with lift device   Skin: Left hip incision-healing  Left heel pressure ulcer  Blanchable redness to IT and groin area and bruise to left upper arm  Scabs to BLE   Pain: Managed with scheduled Tylenol    CMS: Intermittent confusion. AXO self and situation . Mental capacity improving    Dressing: L hip- open to air  Left heel mepilex- UTV   Diet: Renal diet   BG Modified to BID   LDA: Right chest CVC single lumen   Left chest double lumen    Equipment: Pt belongings, IV pole and pump   Plan: Count with POC  RN Managed MG, K+  Care conference done 8/6 with pt's brother Ivan- see medicine note for update   Additional Info: Pt has been cooperative with cares and repositioning in bed during this shift. Patokie boots in place                              "

## 2023-08-07 NOTE — PROGRESS NOTES
ADDENDUM 8/8:  RD met with pt at bedside to complete full physical exam. Pt reports he is worried about his wounds and does not think his left leg is getting better. Does feel that he is doing a lot better compared to a couple weeks ago. Commended pt efforts, validated pt feelings of improvement. Pt states his neck is another problem area and was hesitant to allow physical exam but then agreed. Discussed supplements for wound healing (Expedite cups and bottles), pt agreeable to trying them. Sent a trial for today.      CLINICAL NUTRITION SERVICES - REASSESSMENT NOTE     Nutrition Prescription    RECOMMENDATIONS FOR MDs/PROVIDERS TO ORDER:  Please order pt 3 meals per day, discuss meals ordered with pt.  If pt refuses, please still order meal and tell pt plan for meal and let pt modify as needed. If pt orders outside food, please document this.     Malnutrition Status:    Severe malnutrition in the context of acute on chronic illness    Recommendations already ordered by Registered Dietitian (RD):  Updated pt care order to reflect need for three meals to be ordered daily. Discussed this with RN.  Calorie counts hung on door, updated order to start tomorrow morning.     Future/Additional Recommendations:  Monitor labs, intakes, and weight trends.  If EN becomes POC:  --Enteral access: NGT  --Pending AXR confirmation of feeding tube position  --GOAL: Osmolite 1.5 Dev (or equivalent) @ goal of  55ml/hr  (1320ml/day) provides: 1980 kcals, 83 g PRO, 1005 ml free H20, 268 g CHO, and 0 g fiber daily. + 1 pkt prosource TF 20 = 2060 kcal (24 kcal/kg) and 103 g protein (1.2 g/kg).  --Start TF @ 15 ml/hr and advance by 10 ml q 8 hrs until goal rate.  --Do not start or advance TF rate unless K+ >3.0, Mg++ > 1.5,  and Phos > 1.9.  --Minimum 30 ml q 4 hrs water flushes for tube patency.  --If gastric enteral access: HOB > 30 degrees or Reverse Trendelenburg >10-25 degrees     EVALUATION OF THE PROGRESS TOWARD GOALS   Diet: Renal  "Non-Dialysis  Intake/Tolernace: 0 - 25% of documented meals over last week.    Pt ordering (on average) 793 kcal and 34 g protein per day per [employer]. With documented intakes, pt is likely meeting 12% minimum energy and 10% protein needs.     NEW FINDINGS/REVIEW OF SYSTEMS    Nutrition/GI: RD met with pt at bedside.  Expressed concern for pt not eating well, not ordering. Pt states he did eat a lot this weekend, had [employer] and other foods from here but \"it doesn't matter anyway.\" Pt reports he is \"doing everything [he] can\" and expressed frustration for still being \"crippled\". Commended pt for progress on wound healing per WOC notes. Pt stated that they must be \"looking at it from a different point of view.\" Asked if able to do physical assessment, pt requested this be done tomorrow as he wanted to get rest.    Weights: Pt with previous 81 lb (25%) weight loss in 6 months, now with 53 lb (22%) weight gain in last two months. Unsure of accuracy of last two weights. May be fluid related shifts vs bed scale inaccuracy. Seems weight stable over last three weeks.   08/04/23 0900 134.4 kg (296 lb 4.8 oz) --   07/26/23 1230 134.4 kg (296 lb 6.4 oz) --   07/11/23 1035 132.2 kg (291 lb 6.4 oz) Bed scale   06/28/23 2100 126 kg (277 lb 12.5 oz) Bed scale     06/16/23 115.2 kg (254 lb)   05/26/23 110.4 kg (243 lb 4.8 oz)   05/19/23 110.4 kg (243 lb 4.8 oz)   03/23/23 117 kg (257 lb 14.4 oz)   01/09/23 119.7 kg (264 lb)   11/22/22 147.1 kg (324 lb 4.8 oz)   11/14/22 142.9 kg (315 lb)   11/09/22 146.8 kg (323 lb 9.6 oz)   05/19/22 136.1 kg (300 lb)   07/03/18 148.9 kg (328 lb 4.8 oz)     Skin: See WOC nurse note 8/4. Wounds improving.      Labs reviewed   08/03/23 13:56 08/04/23 12:53 08/04/23 18:47 08/05/23 07:44 08/05/23 11:11 08/06/23 08:13   Sodium 143 144  146 (H)  141   Potassium 3.1 (L) 3.3 (L) 3.4 3.4 3.3 (L) 3.5   Urea Nitrogen 39.6 (H) 38.7 (H)  39.8 (H)  40.3 (H)   Creatinine 3.33 (H) 3.18 (H)  2.98 (H)  2.96 " (H)   Magnesium  1.3 (L) 1.9 1.8     Phosphorus 4.2 4.5  4.1  3.3   Albumin 3.3 (L) 3.2 (L)  3.2 (L)  3.0 (L)   Glucose 111 (H) 100 (H)  96  130 (H)      Medications reviewed: Cipro,doxycycline hyclate, ferrous sulfate, folic acid, insulin (novolog),culturell, multivitamin, potassium chloride, senna, thiamine, vitamin D last given 8/02 D5 gtt ended yesterday    MALNUTRITION  % Intake: </=75% for >/= 1 month (severe)  % Weight Loss: > 10% in 6 months (severe malnutrition) - previously, unsure of accuracy of current weights   Subcutaneous Fat Loss: Facial region:  mild - on visual exam Upper arm - mild-moderate  Muscle Loss: Temporal:  moderate and Upper arm (bicep, tricep):  moderate   Fluid Accumulation/Edema: None noted since 8/3  Malnutrition Diagnosis: Severe malnutrition in the context of acute on chronic illness    Previous Goals   Patient to consume % of nutritionally adequate meal trays TID, or the equivalent with supplements/snacks.  Evaluation: Not met    Previous Nutrition Diagnosis  Inadequate oral intake related to poor appetite, menu fatigue, altered mental status as evidenced by pt and RN report and documented intakes.   Evaluation: No change    CURRENT NUTRITION DIAGNOSIS  Inadequate oral intake related to poor appetite, menu fatigue, altered mental status as evidenced by pt and RN report and documented intakes.     INTERVENTIONS  Implementation  Nutrition education for nutrition relationship to health/disease   Collaboration with other providers     Goals  Patient to consume % of nutritionally adequate meal trays TID, or the equivalent with supplements/snacks.    Monitoring/Evaluation  Progress toward goals will be monitored and evaluated per protocol.    Dee Alanis MS, RDN, LD  RD pager: [phone]  [facility] Weekend/Holiday Pager: [phone]

## 2023-08-07 NOTE — CONSULTS
Psychiatry Consultation; Follow up            Reason for Consult, requesting source:    Decision capacity   Requesting source: Hospitalist     Total time spent in chart review, patient interview and coordination of care; 60   Minutes on date of encounter today             Interim history:    Psychiatry has been consulted to reassess patient's mentation as it seems to be improving along with improvement in kidney functioning. The previous Hospitalist on 8/6 had lengthy discussion with patient and his brother vIan who has been his surrogate decision maker. They both have noted he is doing better with respect to his ability to converse appropriately, understand what is going on. Rahat is able to meaningfully discuss his goals of care and wishes to rehab with end-goal of returning home.     On psychiatric reassessment today 8/7, it was wonderful to see Rahat awake and alert. His previous paucity and speech latency was much improved. He had no bilateral asterixis but does report movements consistent with intentional tremors. He was tearful when talking about his brother Ivan and how appreciative he has been for his help during his hospitalization. Today, Rahat knew he was in a [facility], thought it was July, and was unable to come up with the year and struggled to look up the year on his phone or whiteboard despite being told the date was located on the board. He was often tangential in speech, reminiscing about old memories requiring minimal redirection in assessment. He reports his mood is hopeful today.         Current Medications:      acetaminophen  975 mg Oral Q8H    amLODIPine  10 mg Oral Daily    apixaban ANTICOAGULANT  2.5 mg Oral BID    atorvastatin  40 mg Oral Daily    ciprofloxacin  500 mg Oral Q12H [facility] (08/20)    doxycycline hyclate  100 mg Oral Q12H [facility] (08/20)    ferrous sulfate  325 mg Oral Every Other Day    folic acid  1 mg Oral Daily    heparin lock flush  5-20 mL Intracatheter  "Q24H    insulin aspart  1-7 Units Subcutaneous BID    lactobacillus rhamnosus (GG)  1 capsule Oral BID    magnesium sulfate  4 g Intravenous Once    melatonin  3 mg Oral At Bedtime    miconazole   Topical BID    multivitamin w/minerals  1 tablet Oral Daily    senna-docusate  1 tablet Oral BID    sodium chloride (PF)  10-40 mL Intracatheter Q8H    thiamine  100 mg Oral Daily    cholecalciferol  50 mcg Oral Daily              MSE:   Appearance: awake, alert, adequately groomed, and dressed in hospital scrubs  Attitude:  cooperative  Eye Contact:  fair  Mood:   \"low-key\"  Affect:  mood congruent and slightly restricted  Speech:  clear, coherent  Psychomotor Behavior:    Muscle strength and tone: decreased  Thought Process:  logical and circumstantial  Associations:  no loose associations  Thought Content:  no evidence of suicidal ideation or homicidal ideation and no evidence of psychotic thought  Insight:  partial  Judgement:  limited  Oriented to:  time, person, and place  Attention Span and Concentration:  intact  Recent and Remote Memory:  limited     Vital signs:  Temp: 97.6  F (36.4  C) Temp src: Oral BP: 129/72 Pulse: 79   Resp: 16 SpO2: 96 % O2 Device: None (Room air) Oxygen Delivery: 2 LPM Height: 177.8 cm (5' 10\") Weight: 134.4 kg (296 lb 4.8 oz)  Estimated body mass index is 42.51 kg/m  as calculated from the following:    Height as of this encounter: 1.778 m (5' 10\").    Weight as of this encounter: 134.4 kg (296 lb 4.8 oz).             DSM-5 Diagnosis:   Delirium, resolving   Unspecified neurocognitive disorder           Assessment:   Rahat is a 72-year-old male with complex past medical history admitted for multiple infections including MRSA bacteremia who psychiatry and neurology have been consulted regarding his persistent encephalopathy. Psychiatry is re consulted again today 8/7 as his mentation has improved. His bilateral asterixis and speech latency have improved as his kidney function improves.     I " am still worried about his overall cognitive functioning especially given evidence of moderate to severe cerebral atrophy, evidence of small vessel ischemic changes. I am very glad he is able to be more participatory in discussions regarding his wishes and goals of care, however I recommend Ivan continue to be involved to help make complex medical decisions for patient given his continued lack of abstract thinking, memory, and disorientation.     Capacity assessment:  This patient did NOT meet criteria for capacity for discharge decisions as evidenced by ALL FOUR criteria:   1) Remembering information provided about medical intervention indications, risks, benefits, alternatives. -- NO  2) Manipulating that information in a rational way. -- PARTIALLY   3) Evidencing a choice regarding the medical intervention. -- YES, able to clearly state his goals and wishes   4) Understanding consequences of the evidenced choice. -- PARTIALLY       Capacity is an assessment at any given moment and this may continue to wax and wane. Please don't hesitate to page or re-consult if a specific capacity question arises.           Safia Lozoya, [email or web address]  Consult/Liaison Psychiatry   [facility]

## 2023-08-07 NOTE — PLAN OF CARE
PT order received and chart reviewed. Spent time discussing this case with the team. From rehab perspective plan to have OT re-evaluate patient to assist in determining needs. Will plan to see patient 3 days in a row with expectation that patient is fully participatory all 3 days (not simply engaging in supine exercises). Patient also needs to be mobilizing with lift up to chair with staff. If patient declines any day of therapy patient will be discharged as this has been an ongoing issue both at hospital and at rehab facility. Provider aware and supportive of plan. Per rounds plan for CC on Thursday to discuss the last few days.

## 2023-08-07 NOTE — PROGRESS NOTES
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  Postop course complicated by profound deconditioning.  He was admitted to TCU initially on 12/23/2022 for physical rehabilitation, however there was no progress with therapies after several months of in bed therapy and his refusals to mobilize. Underwent biopsy on 4/7/2023 without evidence of infection. He was transferred back to [facility] for left total hip arthroplasty and reimplantation on 5/26/23 with Dr. Meyers and was back in TCU Presented again to [facility] for persistent MRSA bacteremia on 6/17 and has remained inpatient since that time. Ultimately has not been out of hospital or rehab since 11/22/22. Course complicated by encephalopathy and difficulty participating with PT and OT. Dr. Gipson had long discussion with patient and his brother Ivan on 8/5 with goal for regaining strength.     Today:  - Discussed at multidisciplinary rounds. Therapy will see him again but note that he must participate in order to qualify for rehab at discharge. I discussed this with the patient and strongly encouraged him to participate in mobilization with staff and therapy. He expressed some resistance with me but did not completely refuse. RN to offer oxycodone prior to mobilization attempts. Planning for repeat care conference 8/10.   - Discussed with palliative. They will continue to follow and join care conference.   - Repeat psych evaluation for capacity requested given interval improvement in mental status yet  persistent difficulty with insight into physical limitations.   - Nephrology requested removal of tunneled catheter. Will coordinate with IR regarding duration of anticoagulation hold requested and ideal timing based on  their schedule. Discussed with IR on-call who suspects Eliquis may not need to be held but is unsure of timing so they will contact hospitalist with need for NPO orders and any medication holds if indicated.         Multifactorial encephalopathy - likely toxic metabolic, uremic, delirium, depression contributing - slowly improving   Noted to have acutely worsening mental status around ~7/4, which was initially felt to be related to cefepime neurotoxicity. Neurology consulted. EEG without seizures. CT head, ammonia and VBG wnl. Completed 5 days of antibiotics. No clinical or objective findings concerning for worsening infection. Did have worsening renal function and uremia, and so without clear other etiology discussion was that needed to rule out uremia definitively and agreed to pursue HD for toxin clearance and re-assessment of mentation. However, renal functin has improved with uremia down-trending with no improvement in mental status. This is superimposed on several months of refusal to participate in cares. Concern for underlying cognitive decline and uncontrolled depression.  - Delirium precautions  - Nephrology following, not planning to initiate HD. Requested tunneled catheter removal.   - Psychiatry consulted on 7/18  for capacity assessment - pt not decisional regarding complex medical decisions or disposition, if he continues to improve from a mental status standpoint, consider psych re-assessment   - Neurology and Psychiatry consulted 8/4                 = Thiamine 100mg qday                 = Scheduled Melatonin 3mg at bedtime      MRSA bacteremia due to possible left hip septic arthritis vs other source - resolved   Blood cultures: 6/14, 6/15 + for MRSA (4/4), and 6/16 (1/2 GPCs) and  6/17 (2/2) GPCs. - started vancomycin 6/15.   6/17 sinovial fluid cultures with staph and klebsiella. Purulent drainage noted at incision site at TCU, CT left hip concerning for infection (no mention of joint, mostly anterior in soft tissue) and couldn't exclude abscess/inf  - ID intermittently following through hospital course. Last seen 7/21.  - Continue ceftazidime, vancomycin for C.acnes, Pseudomonas, MRSA PJI until 8/1/2023 (completed)   - Start oral doxycycline and ciprofloxacin on 8/2/2023 through 9/13 (total 12 weeks therapy), intermittently refusing, getting about 50%   - ID follow up ~8/21 (can be inpatient team if remains inpatient or can be outpatient)      Acute Kidney Injury / Acute renal failure - multifactorial, see above/below - improving   Nephrology consulted, appreciate recs. Baseline creatinine 1.0 or 1.1. Etiology: suspect combo of sepsis/inflammation, hypovolemia, blood loss anemia following surgery, vancomycin. UA recollected on 7/19 with granular casts suggesting ongoing dense ATN. Creatinine continuing to uptrend, however no acidosis, electrolyte abnormalities or evidence of hypervolemia. Some concern that uremia could be contributing to his encephalopathy. After GOC on 7/21, planned to pursue short trial of dialysis.  - Nephrology consulted  - Initially planned for short trial of HD, has been deferred d/t renal recovery, HD line placed 8/1. On 8/7 nephrology team recommended removal of line. IR consulted, will discuss with their team 8/8 and contact hospitalist regarding timing of NPO and whether the Eliquis needs to be held or not.       Asymptomatic bacteruria (VRE)  UA obtained on 7/19 to assess for casts, reflexed to culture which is growing VRE. Labs and vitals with low concern for new infection. Discussed with ID who recommended against treatment.     Bilateral LE edema due to volume overload from HALEY and iatrogenic from blood transfusions  Hyperesthesia of bilateral lower  extremities  Despite sensitivity to light touch, pulses intact, both warm, no evidence of acute issue and patient states pain has been stable for the past month.   - Holding diuresis since 7/20 while renal recovery ongoing      L heel pressure wound, unstagable  Noted earlier in pts hospitalization (~6/20), but at that time he refused assessment. Noted again by nursing to be worsening on 7/19 and WOCN consult was placed. Pt was agreeable and had wound assessed. He has been mostly non-agreeable to offloading or other preventative measures recommended by RN and WOCN, but will sometimes allow it with significant encouragement.  - WOCN consult  - Continue to encourage pt to utilize offloading techniques and to participate in wound cares.     Hypernatremia - resolved & recurrent   Uptrending recently, 147 on 7/18. Likely volume down. S/p D5W on 7/18, resolved. Elevated again and responded to D5W on 7/29, and again on 8/1  - Monitor.  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 For DVT/PE treatment it appears that dose should be increased if tolerating.      S/p left hip explantation of left hip antibiotic spacer and revision of left total hip arthroplasty (5/26/2023)  - ortho performed washout 6/21  -Activity: 50% PWB LLE with posterior hip precautions [id number]  -AFO ordered for foot drop but patient refusing  -PT/OT  -Knee to be kept even in a foam leg elevator to relax sciatic nerve  -Pain: holding  robaxin 4 times a day with mental status changes (discontinuing), APAP PRN  -stop tramadol (7/11) due to renal function  -oxycodone 2.5-5 mg q6h PRN- taking very minimal amts so unlikely to be contributing to mental status     Type 2 diabetes mellitus  - Last hemoglobin A1c 5.6% on 6/16/2023.  On glipizide, metformin, Actos, and Victoza prior to hospitalizations and surgery.   - continue ISS     Constipation, improved  -Continue Colace 100 mg twice daily  -Continue senna 1 tablet twice daily-- had briefly increased to 2 tab twice daily due to lack of stool, now improved  -Continue MiraLAX daily     Hypomagnesemia  -monitor and replete as indicated     Severe malnutrition in the context of acute on chronic illness  Goal for patient is to consume % of meals TID. Goal is 2688-7164 kcals/daily. Protein needs- 100-126 grams/daily.   -RD consult and appreciate recs  -regular diet  -Weekly weights, daily I/O     Adjustment disorder with depressed mood  Prolonged grief disorder  Patient has been with depressed mood in setting of ongoing health issues, as evidenced by lack of motivation to work with therapies during both TCU admissions.  Past history of passive suicidal statements. Psychiatry saw patient during hospital admission on 5/31 due to lack of motivation with therapies. Patient declined interest in starting any psychiatric medications. Was willing to engage with health psychology.   -Continue to monitor for willingness to start antidepressant therapy, psych re-consulted on 8/7     HTN:   - Started amlodipine 7/16, increased to 10mg 7/30. Still intermittently hypertensive, monitoring.      Stable and Resolved Issues:  Hyperlipidemia-continue PTA Lipitor 40 mg daily  JAIR-CPAP when sleeping       Diet: Renal Diet (non-dialysis)  Calorie Counts    DVT Prophylaxis: DOAC  Tran Catheter: Not present  Lines: PRESENT      CVC Single Lumen Right Internal jugular Non - valved (open ended);Tunneled;Power injectable-Site  "Assessment: WDL  CVC Double Lumen Left Internal jugular Non - valved (open ended);Tunneled-Site Assessment: WDL      Cardiac Monitoring: None  Code Status: Full Code      Clinically Significant Risk Factors            # Hypomagnesemia: Lowest Mg = 1.2 mg/dL in last 2 days, will replace as needed   # Hypoalbuminemia: Lowest albumin = 2.2 g/dL at 7/8/2023  8:51 AM, will monitor as appropriate            # Severe Obesity: Estimated body mass index is 42.51 kg/m  as calculated from the following:    Height as of this encounter: 1.778 m (5' 10\").    Weight as of this encounter: 134.4 kg (296 lb 4.8 oz).   # Severe Malnutrition: based on nutrition assessment         Disposition Plan    Pending care conference as patient states desire for rehab but as of yet has not sufficiently participated in therapy to qualify.        Carl Rodas MD  Hospitalist Service, GOLD TEAM 21  [facility]  Securely message with [employer] (more info)  Text page via [employer] Paging/Directory   See signed in provider for up to date coverage information  ______________________________________________________________________    Interval History   The patient had no acute events overnight. He expressed frustration about still being in the hospital. He told me that he does want to get stronger but does not want to use the lift because he thinks that he could stand and walk to the chair with the help of 1-2 people. Because he has not been out of bed for a very long time, I strongly encouraged him to accept help from the lift and improve his strength so he can move move independently with less assistance. He has leg pain, worse on the left that he says has been stable for a month. He reports no difficulty eating meals and had eggs and oatmeal in front of him when I visited.   Discussed with nephrology who recommended IR removal of tunneled catheter; consult placed.   RN to encourage patient mobility, offer " pain medication before movement to reduce that as a barrier.     Physical Exam   Vital Signs: Temp: 97.6  F (36.4  C) Temp src: Oral BP: (!) 153/80 Pulse: 82   Resp: 16 SpO2: 97 % O2 Device: None (Room air)    Weight: 296 lbs 4.77 oz  Physical Exam  Vitals reviewed.   Constitutional:       Comments: Reclined in bed with head of bed elevated eating eggs and oatmeal.    HENT:      Head: Atraumatic.      Nose: Nose normal.   Eyes:      Conjunctiva/sclera: Conjunctivae normal.   Cardiovascular:      Rate and Rhythm: Normal rate.      Pulses: Normal pulses.   Pulmonary:      Effort: Pulmonary effort is normal. No respiratory distress.      Breath sounds: No wheezing or rales.   Abdominal:      General: There is no distension.      Palpations: Abdomen is soft.      Tenderness: There is no abdominal tenderness.   Musculoskeletal:      Right lower leg: No edema.      Left lower leg: No edema.      Comments: Feet in soft boots bilaterally. Significant pain with light touch of skin of left foot, though color normal, no swelling, pulses intact. He states this has been present a month.    Skin:     General: Skin is warm.      Capillary Refill: Capillary refill takes less than 2 seconds.   Neurological:      Mental Status: He is alert.      Comments: Debility is diffuse. Able to wiggle toes bilaterally, use arms to eat from tray though with some tremor.    Psychiatric:      Comments: Affect somewhat irritable. He expressed confidence that he could walk with people helping him although he has not been out of bed for a very long time.       Medical Decision Making       55 MINUTES SPENT BY ME on the date of service doing chart review, history, exam, documentation & further activities per the note.      Data     I have personally reviewed the following data over the past 24 hrs:    7.8  \   8.1 (L)   / 271     140 106 38.3 (H) /  102 (H)   3.7; 3.7 24 2.81 (H) \     ALT: N/A AST: N/A AP: N/A TBILI: N/A   ALB: 3.1 (L) TOT PROTEIN:  N/A LIPASE: N/A       Imaging results reviewed over the past 24 hrs:   No results found for this or any previous visit (from the past 24 hour(s)).

## 2023-08-07 NOTE — PROGRESS NOTES
"   08/07/23 1546   Appointment Info   Signing Clinician's Name / Credentials (OT) Wendy Betancur MS, OTR/L, CLT   Rehab Comments (OT) L posterior hip precautions   Living Environment   People in Home significant other   Current Living Arrangements house   Home Accessibility stairs to enter home;stairs within home   Number of Stairs, Main Entrance 2   Stair Railings, Main Entrance none   Number of Stairs, Within Home, Primary six;seven   Stair Railings, Within Home, Primary railings safe and in good condition   Transportation Anticipated family or friend will provide   Living Environment Comments Per chart review, pt lives in split level home with 2 steps to enter, 1 step to main level, then 6 up to bedroom/bathroom or 6 down to family room   Self-Care   Usual Activity Tolerance fair   Current Activity Tolerance poor   Activity/Exercise/Self-Care Comment Due to multiple medical complications, pt has been bed bound x 8 months; total assist & use of lift for bed to chair   General Information   Onset of Illness/Injury or Date of Surgery 08/07/23  (new orders)   Referring Physician Jannie Gipson MD   Patient/Family Therapy Goal Statement (OT) to get up   Additional Occupational Profile Info/Pertinent History of Current Problem Per chart: \"Waldo Bustos is a 72 year old male status post left revision total hip arthroplasty on 5/26/2023 with Dr. Meyers now presenting with left hip prosthetic joint infection status post left hip irrigation and debridement on 6/21/2023 with Dr. Meyers.\" PT/OT deferred previously as pt with limited participation and tolerance. At  yesterday, pt reports motivation now to get up and work with therapy.   Existing Precautions/Restrictions no hip IR;no hip ADD past midline;90 degree hip flexion;no pivoting or twisting   Left Lower Extremity (Weight-bearing Status) weight-bearing as tolerated (WBAT)   Cognitive Status Examination   Orientation Status orientation to person, place and " time   Cognitive Status Comments Pt reports he is not at cognitive baseline but feels like he is getting better   Pain Assessment   Patient Currently in Pain Yes, see Vital Sign flowsheet   Strength Comprehensive (MMT)   Comment, General Manual Muscle Testing (MMT) Assessment generalized deconditioning   Bed Mobility   Comment (Bed Mobility) max A at L LE when rolling to the R side, min A rolling L   Transfers   Transfer Comments n/a   Sit-Stand Transfer   Sit-Stand [place] (Transfers) not tested   Balance   Balance Comments unable to achieve EOB sitting   Activities of Daily Living   BADL Assessment/Intervention upper body dressing;lower body dressing;bathing;toileting   Bathing Assessment/Intervention   [place] Level (Bathing) maximum assist (25% patient effort)   Upper Body Dressing Assessment/Training   [place] Level (Upper Body Dressing) maximum assist (25% patient effort)   Lower Body Dressing Assessment/Training   [place] Level (Lower Body Dressing) dependent (less than 25% patient effort)   Toileting   [place] Level (Toileting) dependent (less than 25% patient effort)   Clinical Impression   Criteria for Skilled Therapeutic Interventions Met (OT) Yes, treatment indicated   OT Diagnosis decreased I with ADL   OT Problem List-Impairments impacting ADL activity tolerance impaired;mobility;pain;post-surgical precautions;strength   Assessment of Occupational Performance 5 or more Performance Deficits   Identified Performance Deficits dressing, bathing, toileting, transfers, IADL   Planned Therapy Interventions (OT) ADL retraining;bed mobility training;strengthening;transfer training   Clinical Decision Making Complexity (OT) high complexity   Risk & Benefits of therapy have been explained evaluation/treatment results reviewed;care plan/treatment goals reviewed;risks/benefits reviewed;current/potential barriers reviewed;participants voiced agreement with care plan;participants  included;patient   Clinical Impression Comments Pt known from previous episodes of care, appearing willing this date to work though limited by pain. Will plan to work on progressing activity tolerance to support ADL participation.   OT Total Evaluation Time   OT Luz, High Complexity Minutes (35506) 15   OT Goals   Therapy Frequency (OT) 5 times/wk   OT Predicted Duration/Target Date for Goal Attainment 09/01/23   OT: Hygiene/Grooming supervision/stand-by assist   OT: Upper Body Bathing Minimal assist   OT: Bed Mobility Minimal assist   OT: Toilet Transfer/Toileting Moderate assist   OT Discharge Planning   OT Plan 1/5 POC Mon; try 3pm as able, EOB vs up to recliner once delivered, issue UB HEP   OT Discharge Recommendation (DC Rec) Transitional Care Facility;Long term care facility   OT Rationale for DC Rec Pt with significant deconditioning, now reporting motivated to go back to rehab and return to PLOF. Participated well today, would like this trend to continue. If participate is inconsistent, limited rehab potential and pt would need LTC.   OT Brief overview of current status min Ax1 to EOB, unable to achieve full sitting   Total Session Time   Timed Code Treatment Minutes 23   Total Session Time (sum of timed and untimed services) 38

## 2023-08-07 NOTE — PROGRESS NOTES
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  Postop course complicated by profound deconditioning.  He was admitted to TCU initially on 12/23/2022 for physical rehabilitation, however therapies no longer worked with patient after several months of in bed therapy and his refusals to mobilize.  Underwent biopsy on 4/7/2023 without evidence of infection.  He was transferred back to [facility] for left total hip arthroplasty and reimplantation on 5/26/23 with Dr. Meyers.  Presented again to [place] for persistent MRSA bacteremia on 6/17. His course has been c/b HALEY. Baseline creat 1.0 as recent as 6/19/23).         1-Recurrent non-oliguric HALEY in setting of significant hemodynamic changes including diuretics and Vancomycin use, now improving  Since his admission, have had multiple HALEY episodes. Again developed HALEY in July with creatinine peak of 6.05 on 7/25 and started to improve to 3.1 on 8/4 with eGFR of 20 ml, on 8/7 Scr 2.81 wieth eGFR 24, the best in the past month.  Mentation and myoclonic jerks stable/not improving with improvement in renal function/ BUN levels. Neurology and psych was consulted again with no big plans for his mentation.  - continue holding diuresis. No acute indication for iHD initiation today as his creatinine continues to improve.   - with given trajectory, hoping for continued renal function improvement but unclear if that would improve his mentation as it is not completely consistent with uremia to begin with.  - underwent tunneled left CVC placement 8/1 by IR in anticipation of HD session, but with improved renal  function, will hold off on dialysis for now.   - will have dialysis CVC removed given > 7 day trend of improving Scr.     2. HTN - Borderline control in HALEY. No edema  Current regimen: Amlodipine 10 mg daily    - continue holding diuretic     3. MRSA bacteremia- s/p 6 wks of Vancomycin and ceftazidime 8/1/23. Now on po cipro and doxycycline on 8/2 for 6 weeks..    - UC with VRE but only 10-50 k. No treatment required   - Per ID     4. Electrolytes  - K 3.7, Na 140   History of hypernatremia, improved with D5  and increasing oral intake     5 Acid base - bicarb 24, stable  - will monitor closely     6. BMD - Ca 8.1 Phos 3.5, albumin 3.1   - Malnutrition present: Getting MVI. Would benefit from dietary supplement   - No need for Phos binder at this time   - Vit D 16   - Began D3 - 50 mcq every day 7/18   - Switched to normal diet 7/26, renal diet on 8/1     8. Anemia - Hgb stable around 7-8   - Etiology acute illness   - On Iron, folic acid   - Iron studies 6/26/23: Ferritin 2616, Fe 30 IS 21   - Does have DVT history on Eliquis - switched to heparin   - Ferritin too high for IV iron   - Transfusions per primary team    Recommendations were communicated to primary team via this note. Primary team was paged to discuss removal of dialysis line, spoke with bedside nurse as well.     Seen and discussed with Dr. Schroeder.     OPAL MAYER, PA-C   Division of Renal Disease and Hypertension  [facility]  fredmail  [place] Web Console  Attestation:  I, Dr. Allie Schroeder, saw and evaluated Waldo Bustos today as pat of a shared visit. I have reviewed and discussed with the NPP their history, physical exam and plan.     I personally reviewed major complaints, physical findings, investigations, medications, lab values, vital signs, I/O's and the overall management plan.      I personally performed a history, exam and agree with the assessment and plan as written.         Allie Schroeder MD MS FNKF August 7,  "2023            Interval History :   Nursing and provider notes from last 24 hours reviewed.  In the last 24 hours Waldo Rodriguezy been hemodynamically stable. No new symptoms today. He is resting. Scr continues to improve.  ml measured and continues to have unmeasured episodes.      Review of Systems:   Was not able to review because of change in mentation.    Physical Exam:   I/O last 3 completed shifts:  In: -   Out: 100 [Urine:100]   BP (!) 153/80 (BP Location: Left arm)   Pulse 82   Temp 97.6  F (36.4  C) (Oral)   Resp 16   Ht 1.778 m (5' 10\")   Wt 134.4 kg (296 lb 4.8 oz)   SpO2 97%   BMI 42.51 kg/m       General : Pt resting, not in acute distress   Lungs : no respiratory distress  Cardiac : RRR  LE : no Edema noted, boots in place  Dialysis Access : left CVC    Labs:   All labs reviewed by me  Electrolytes/Renal -   Recent Labs   Lab Test 08/07/23  0758 08/06/23  2206 08/06/23  0853 08/06/23  0813 08/05/23  2244 08/05/23  1111 08/05/23  1024 08/05/23  0744 08/04/23  2234 08/04/23  1847 08/04/23  1823 08/04/23  1253   NA  --   --   --  141  --   --   --  146*  --   --   --  144   POTASSIUM  --   --   --  3.5  --  3.3*  --  3.4  --  3.4  --  3.3*   CHLORIDE  --   --   --  107  --   --   --  109*  --   --   --  107   CO2  --   --   --  24  --   --   --  24  --   --   --  23   BUN  --   --   --  40.3*  --   --   --  39.8*  --   --   --  38.7*   CR  --   --   --  2.96*  --   --   --  2.98*  --   --   --  3.18*   * 104* 139* 130*   < >  --    < > 96   < >  --    < > 100*   MAIKOL  --   --   --  8.0*  --   --   --  8.4*  --   --   --  8.4*   MAG  --   --   --   --   --   --   --  1.8  --  1.9  --  1.3*   PHOS  --   --   --  3.3  --   --   --  4.1  --   --   --  4.5    < > = values in this interval not displayed.       CBC -   Recent Labs   Lab Test 08/05/23  0744 08/04/23  1253 08/03/23  1356   WBC 7.9 9.1 8.1   HGB 8.5* 8.3* 8.5*    176 242       LFTs -   Recent Labs   Lab Test " 08/06/23  0813 08/05/23  0744 08/04/23  1253 07/06/23  1708 07/06/23  0713 07/04/23  0627 06/22/23  0711 06/20/23  0704   ALKPHOS  --   --   --   --  103 99  --  93   BILITOTAL  --   --   --   --  <0.2 0.2  --  0.2   ALT  --   --   --   --  12 14  --  14   AST  --   --   --   --  9 11  --  14   PROTTOTAL  --   --   --   --  5.3* 5.9*  --  5.9*   ALBUMIN 3.0* 3.2* 3.2*   < > 2.4*  2.4* 2.6*   < > 2.3*    < > = values in this interval not displayed.       Iron Panel -   Recent Labs   Lab Test 06/26/23  0513   IRON 30*   IRONSAT 21   FADI 2,616*       Current Medications:   acetaminophen  975 mg Oral Q8H    amLODIPine  10 mg Oral Daily    apixaban ANTICOAGULANT  2.5 mg Oral BID    atorvastatin  40 mg Oral Daily    ciprofloxacin  500 mg Oral Q12H [facility] (08/20)    doxycycline hyclate  100 mg Oral Q12H [facility] (08/20)    ferrous sulfate  325 mg Oral Every Other Day    folic acid  1 mg Oral Daily    heparin lock flush  5-20 mL Intracatheter Q24H    insulin aspart  1-7 Units Subcutaneous BID    lactobacillus rhamnosus (GG)  1 capsule Oral BID    melatonin  3 mg Oral At Bedtime    miconazole   Topical BID    multivitamin w/minerals  1 tablet Oral Daily    senna-docusate  1 tablet Oral BID    sodium chloride (PF)  10-40 mL Intracatheter Q8H    thiamine  100 mg Oral Daily    cholecalciferol  50 mcg Oral Daily      sodium chloride 0.9%       OPAL MAYER, PADeeptiC

## 2023-08-07 NOTE — PROGRESS NOTES
"  VS: BP (!) 153/80 (BP Location: Left arm)   Pulse 82   Temp 97.6  F (36.4  C) (Oral)   Resp 16   Ht 1.778 m (5' 10\")   Wt 134.4 kg (296 lb 4.8 oz)   SpO2 97%   BMI 42.51 kg/m     O2: Stable on RA denied SOB & CP   Output: Using bedside urinal & bedpan   Last BM: 8/6/23   Activity: Pt has agreed to work with therapies. He is to work with them 3 days in a row according to the therapist. Nursing / PT to get him up in chair daily using ceiling lift   Skin: L heel pressure injury,    Pain: Pain to L hip managed w/scheduled tylenol   CMS: A/O to self & situation w/intermittent confusion. Noting improvements with communication and thought process   Dressing: L heel mepilex CDI   Diet: Renal diet & now calorie counts for 3 consecutive days beginning today   LDA: R single lumen, L double lumen heparin locked   Equipment: Personal belongings   Plan: Pt has agreed to work with therapies. Requirements are that he must work with them for 3 times for 3 days in a row. If pt refuses at all during those three days, he will be discharged due to ongoing concerns of pt refusing therapies.   Additional Info:      "

## 2023-08-08 NOTE — PROGRESS NOTES
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  Postop course complicated by profound deconditioning.  He was admitted to TCU initially on 12/23/2022 for physical rehabilitation, however therapies no longer worked with patient after several months of in bed therapy and his refusals to mobilize.  Underwent biopsy on 4/7/2023 without evidence of infection.  He was transferred back to [facility] for left total hip arthroplasty and reimplantation on 5/26/23 with Dr. Meyers.  Presented again to [place] for persistent MRSA bacteremia on 6/17. His course has been c/b HALEY. Baseline creat 1.0 as recent as 6/19/23).         1-Recurrent non-oliguric HALEY in setting of significant hemodynamic changes including diuretics and Vancomycin use, now improving  Since his admission, have had multiple HALEY episodes. Again developed HALEY in July with creatinine peak of 6.05 on 7/25 and started to improve to 3.1 on 8/4 with eGFR of 20 ml, on 8/7 Scr 2.81 with eGFR 23, and Scr today 2.95 with eGFR 22.  Mentation is somewhat improved during today's visit. No myoclonic jerks noted.   Neurology and psych was consulted again with no big plans for his mentation.  - continue holding diuresis. Creatinine is stable. No indications for dialysis.   - with given trajectory, hoping for continued renal function improvement but unclear if that would improve his mentation as it is not completely consistent with uremia to begin with.  - underwent tunneled left CVC placement 8/1 by IR in anticipation of HD session, but with improved renal function, will hold off on dialysis for now.   - IR  removed left CVC today  - nephrology will sign off today, we will follow labs peripherally  - recommend pt follow up with nephrology on outpatient basis within 2 weeks of discharge     2. HTN - Borderline control in HALEY. No edema  Current regimen: Amlodipine 10 mg daily   - BP's mostly 129-140s/60-70s   - continue holding diuretic     3. MRSA bacteremia- s/p 6 wks of Vancomycin and ceftazidime 8/1/23. Now on po cipro and doxycycline on 8/2 for 6 weeks..    - UC with VRE but only 10-50 k. No treatment required   - Per ID     4. Electrolytes  - K 3.7, Na 141   History of hypernatremia, improved with D5  and increasing oral intake     5 Acid base - bicarb 25, stable  - will monitor closely     6. BMD - Ca 8.5 Phos 3.7, albumin 3.2   - Malnutrition present: Getting MVI. Would benefit from dietary supplement   - No need for Phos binder at this time   - Vit D 16   - Began D3 - 50 mcq every day 7/18   - Switched to normal diet 7/26, renal diet on 8/1     8. Anemia - Hgb stable around 7-8   - Etiology acute illness   - On Iron, folic acid   - Iron studies 6/26/23: Ferritin 2616, Fe 30 IS 21   - Does have DVT history on Eliquis - switched to heparin   - Ferritin too high for IV iron   - Transfusions per primary team    Recommendations were communicated to primary team via this note.     Seen and discussed with Dr. Schroeder.     OPAL MAYER, PA-C   Division of Renal Disease and Hypertension  [facility]  [employer]ma[place] [place] Web Console      Attestation:  I, Dr. Allie Schroeder, saw and evaluated Waldo Bustos today as pat of a shared visit. I have reviewed and discussed with the NPP their history, physical exam and plan.     I personally reviewed major complaints, physical findings, investigations, medications, lab values, vital signs, I/O's and the overall management plan.      I personally performed a history, exam and agree with the assessment and plan as written.     My Key management decisions are included in the note in  "bold.       Allie Schroeder MD MS FNKF August 8, 2023            Interval History :   Nursing and provider notes from last 24 hours reviewed.  In the last 24 hours Waldo Bustos been hemodynamically stable. No new symptoms today. No I/O's documented yesterday except for 1 unmeasured urine. He had left CVC removed.     Review of Systems:   No fevers or chills, resting.     Physical Exam:   I/O last 3 completed shifts:  In: -   Out: 100 [Urine:100]   BP (!) 166/74 (BP Location: Left arm)   Pulse 75   Temp 97.8  F (36.6  C) (Oral)   Resp 16   Ht 1.778 m (5' 10\")   Wt 134.4 kg (296 lb 4.8 oz)   SpO2 96%   BMI 42.51 kg/m       General : Pt resting, not in acute distress   Lungs : no respiratory distress  Cardiac : RRR  LE : no Edema noted, boots in place  Dialysis Access : N/a    Labs:   All labs reviewed by me  Electrolytes/Renal -   Recent Labs   Lab Test 08/08/23  0749 08/08/23  0738 08/07/23  2158 08/07/23  2106 08/07/23  1203 08/07/23  0915 08/06/23  0853 08/06/23  0813 08/05/23  1024 08/05/23  0744   NA  --  141  --   --   --  140  --  141  --  146*   POTASSIUM  --  3.7  --   --   --  3.7  3.7  --  3.5   < > 3.4   CHLORIDE  --  106  --   --   --  106  --  107  --  109*   CO2  --  25  --   --   --  24  --  24  --  24   BUN  --  40.2*  --   --   --  38.3*  --  40.3*  --  39.8*   CR  --  2.95*  --   --   --  2.81*  --  2.96*  --  2.98*   * 116* 174*  --    < > 106*   < > 130*   < > 96   MAIKOL  --  8.5*  --   --   --  8.1*  --  8.0*  --  8.4*   MAG  --   --   --  2.1  --  1.2*  --   --   --  1.8   PHOS  --  3.7  --   --   --  3.5  --  3.3  --  4.1    < > = values in this interval not displayed.       CBC -   Recent Labs   Lab Test 08/07/23  0915 08/05/23  0744 08/04/23  1253   WBC 7.8 7.9 9.1   HGB 8.1* 8.5* 8.3*    257 274       LFTs -   Recent Labs   Lab Test 08/08/23  0738 08/07/23  0915 08/06/23  0813 07/06/23  1708 07/06/23  0713 07/04/23  0627 06/22/23  0711 06/20/23  0704   ALKPHOS  --  "  --   --   --  103 99  --  93   BILITOTAL  --   --   --   --  <0.2 0.2  --  0.2   ALT  --   --   --   --  12 14  --  14   AST  --   --   --   --  9 11  --  14   PROTTOTAL  --   --   --   --  5.3* 5.9*  --  5.9*   ALBUMIN 3.2* 3.1* 3.0*   < > 2.4*  2.4* 2.6*   < > 2.3*    < > = values in this interval not displayed.       Iron Panel -   Recent Labs   Lab Test 06/26/23  0513   IRON 30*   IRONSAT 21   FADI 2,616*       Current Medications:   acetaminophen  975 mg Oral Q8H    amLODIPine  10 mg Oral Daily    apixaban ANTICOAGULANT  2.5 mg Oral BID    atorvastatin  40 mg Oral Daily    ciprofloxacin  500 mg Oral Q12H [facility] (08/20)    doxycycline hyclate  100 mg Oral Q12H [facility] (08/20)    ferrous sulfate  325 mg Oral Every Other Day    folic acid  1 mg Oral Daily    heparin lock flush  5-20 mL Intracatheter Q24H    insulin aspart  1-7 Units Subcutaneous BID    lactobacillus rhamnosus (GG)  1 capsule Oral BID    melatonin  3 mg Oral At Bedtime    miconazole   Topical BID    multivitamin w/minerals  1 tablet Oral Daily    senna-docusate  1 tablet Oral BID    sodium chloride (PF)  10-40 mL Intracatheter Q8H    thiamine  100 mg Oral Daily    cholecalciferol  50 mcg Oral Daily      sodium chloride 0.9%       OPAL MAYER, PADeeptiC

## 2023-08-08 NOTE — PROGRESS NOTES
[facility] Nurse Inpatient Assessment     Consulted for: Left heel    7/25/2023: Attempted to see previously for buttocks. Patient continues to flatly refuse assessment for buttocks today. Per bedside RN, buttocks is red but blanchable, patient continues to be incontinent of urine and stool.       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  Postop course complicated by profound deconditioning.  He was admitted to TCU initially on 12/23/2022 for physical rehabilitation, however therapies no longer worked with patient after several months of an in bed therapy and his refusals to mobilize.  Underwent biopsy on 4/7/2023 without evidence of infection.  He was transferred back to [facility] for left total hip arthroplasty and reimplantation on 5/26/23 with Dr. Meyers.  Presented again to [facility] for persistent MRSA bacteremia on 6/17.     Assessment:      Areas visualized during today's visit: Lower extremities     Pressure Injury Location: Left heel    Last photo: 8/8  Wound type: Pressure Injury     Pressure Injury Stage: Unstageable, hospital acquired, assessed with SANDRA [facility] on 7/28  Wound history/plan of care:  Pt known to [facility] service. Pt known to decline cares and repositioning.   7/19 initial assessment: Spent approx 20 mins on negotiating how to move leg to be able to minimally assess wound. Pt agreed to see picture of wound. This writer explained wound to pt using the photo. Discussed with patient if he would like legs elevated on pillows or boots. He agreed to pillows.    7/20: Assessed wound with  Kristen Hadley RN CWOCN. Confirmed thick slough/eschar wound base. Again discussed keeping heels off bed with pt.  7/25/2023: Of note, patient has refused and continues to refuse heel off-loading boots on assessment today.  7/28: pt had heel lift boots in place  Wound base: 100 % slough/eschar     Palpation of the wound bed: boggy      Drainage: scant     Description of drainage: serosanguinous     Measurements (length x width x depth, in cm) 3 x 3 x 0.2 cm     Tunneling N/A     Undermining N/A  Periwound skin: Intact      Color: normal and consistent with surrounding tissue      Temperature: normal   Odor: none  Pain: severe, shooting and stabbing  Pain intervention prior to dressing change: slow and gentle cares   Treatment goal: Infection control/prevention and soften nonviable tissue  STATUS: evolving  Supplies ordered: discussed with RN and discussed with patient     Wound location: left buttock and gluteal cleft    Last photo: 8/8  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)  Wound history/plan of care: pt is incontinent and has previously refused assessment of area by WOC  Wound base: 100 % epidermis      Palpation of the wound bed: normal      Drainage: scant     Description of drainage: serosanguinous     Measurements (length x width x depth, in cm): see photo      Tunneling: N/A     Undermining: N/A  Periwound skin: Intact and Erythema- blanchable      Color: red      Temperature: normal   Odor: none  Pain: moderate, sharp- mostly related to left hip pain  Pain interventions prior to dressing change: slow and gentle cares   Treatment goal: Maintain (prevention of deterioration) and Protection  STATUS: improving  Supplies ordered: at bedside, discussed with RN, and discussed with patient      Wound Location: Left medial thigh- no WOC consult     On assessment of patient bloody mucous noted on blue pad and when cleaning buttocks no mucous noted from rectum but upon cleansing of perineal area there was  an indurated area noted to left inner thigh with bloody pus-like drainage. Pointed out abscess to floor RN and left area BRII to drain.      Treatment Plan:     Bilateral buttocks and gluteal cleft wounds: BID and PRN with incontinence episodes  Cleanse wound with dry wipes and martell cleanse and protect spray.   Apply light layer of Triad paste (#984056) to wounds and red skin on bilateral buttocks  With repeat application, do not scrub the paste, only remove soiled paste and reapply.  If complete removal of paste is necessary use baby oil/mineral oil (#972788) and soft wash cloth.  Ensure pt has Isaac-cushion (#[id number]) while sitting up in the chair.  Use only one Covidien pad in between mattress and pt. No brief in bed.    Left heel wound(s): Every other day   Strongly encourage pt to elevate heels on pillows to float off bed surface at all times. (He agreed to this with WOC)  Cleanse wound with wound cleanser.   Apply light layer of Triad paste (#[id number]) to wound bed  Cut piece of adaptic to cover paste and wound  Secure with mepilex.  If patient agrees to heel off-loading boots, please apply.    Orders: Reviewed    RECOMMEND PRIMARY TEAM ORDER: None, at this time  Education provided: importance of repositioning, plan of care, wound progress and Infection prevention   Discussed plan of care with: Patient and Nurse  WOC nurse follow-up plan: weekly, stable POC  Notify WOC if wound(s) deteriorate.  Nursing to notify the Provider(s) and re-consult the WOC Nurse if new skin concern.    DATA:     Current support surface: Bariatric Low air loss (BIN pump, Isolibrium, Pulsate, skin guard, etc)  Containment of urine/stool: Incontinence Protocol  BMI: Body mass index is 42.51 kg/m .   Active diet order: Orders Placed This Encounter      Renal Diet (non-dialysis)     Output: I/O last 3 completed shifts:  In: -   Out: 100 [Urine:100]     Labs:   Recent Labs   Lab 08/07/23  0915 08/02/23  0803 08/01/23  1022   ALBUMIN 3.1*   < >  3.1*   HGB 8.1*   < > 8.8*   INR  --   --  1.34*   WBC 7.8   < > 8.0    < > = values in this interval not displayed.       Pressure injury risk assessment:   Sensory Perception: 3-->slightly limited  Moisture: 2-->very moist  Activity: 1-->bedfast  Mobility: 2-->very limited  Nutrition: 2-->probably inadequate  Friction and Shear: 1-->problem  Rafael Score: 11    Mona Horton RN CWOCN  Pager no longer is use, please contact through [employer] group: [facility] Nurse [facility]  Dept. Office Number: [phone]

## 2023-08-08 NOTE — PROVIDER NOTIFICATION
Message sent to ruth CONTRERAS:  552 DNICKY has orders for BG check BID but has orders for SS coverage at 8p and 10p.Can we have it modified? Pls advise Betzaida MUNGUIA [phone]    Addendum:  Patient getting BG checks BID and sliding scale was updated to twice daily but appears to be ordered for 2000 and 2200. He has not needed any sliding scale in past few days. Thus, I will hold the sliding scale and dayteam can further discuss with patient tomorrow. Not sure that it makes much sense to randomly correct BG based via sliding scale coverage on BID checks.    Vivian Machuca PA-C  Internal Medicine [facility] Hospitalist  [facility]

## 2023-08-08 NOTE — PLAN OF CARE
VS: Temp: 97.8  F (36.6  C) Temp src: Oral BP: (!) 166/74 Pulse: 75   Resp: 16 SpO2: 96 % O2 Device: None (Room air)      O2: SpO2 > 95% and stable on RA. LS clear and equal bilaterally. Denies chest pain and SOB.    Output: Incontinent bowel & bladder. Uses bedside urinal at times   Last BM: 5/6/2023, denies abdominal discomfort. BS active / passing flatus.    Activity: Not OOB this shift. Pt was able to turn to the side when was changed, but refused changing at the end of the shift.   Pt is AX2 with cares.    Skin: WDL except, L hip healing incision. Left heel pressure ulcer  Blanchable redness to buttocks and groin area barrier cream applied. Bruises to left upper arm & Scabs to BLE   Pain: Pain managed with scheduled Tylenol & Oxycodone.    CMS: Intact, A&Ox3, disoriented to place & time. Denies numbness and tingling.   Dressing: Pt refused assessment for his L heel. Rookie boots in place.   Diet: Regular diet. Denies nausea/vomiting.   BG check at bedtime 174, Pt refused BG check overnight.    LDA: Right chest CVC single lumen   Left chest double lumen    Equipment: IV pole, personal belongings,    Plan: TBD. Continue with plan of care. Call light within reach, pt able to make needs known.    Additional Info: Pt on contact precaution.  Pt on RN managed Mg, & K.

## 2023-08-08 NOTE — CONSULTS
Interventional Radiology Consult Note    Patient is on IR schedule 8/8/2023 for a TCVC Removal. Patients line has been in for 1 week and his kidney injury has recovered. TCVC is no longer needed.   Labs WNL for procedure.    No NPO required.  Medications to be held include: none  Consent will be done prior to procedure.     Please [place] IR [place] at [phone] for estimated time of procedure.     Case discussed with requesting provider .    Jose Stockton PA-C  Interventional Radiology  Phone: [phone]  Pager: [phone]

## 2023-08-08 NOTE — PLAN OF CARE
"Nursing staff went in to change pt's brief and pt stated \"I'm not wet enough to be changed\" Pt educated on the importance of not sitting in soiled brief d/t increased risk for skin breakdown. Pt refused to allow staff to change his brief or reposition him and \"shooed\" away nursing staff.  "

## 2023-08-08 NOTE — PROCEDURES
Interventional Radiology Brief Post Procedure Note    Procedure: IR CVC TUNNEL REMOVAL LEFT    Proceduralist: Jose Stockton PA-C    Assistant: none    Time Out: Prior to the start of the procedure and with procedural staff participation, I verbally confirmed the patient s identity using two indicators, relevant allergies, that the procedure was appropriate and matched the consent or emergent situation, and that the correct equipment/implants were available. Immediately prior to starting the procedure I conducted the Time Out with the procedural staff and re-confirmed the patient s name, procedure, and site/side. (The Joint Commission universal protocol was followed.)  Yes    Medications   Medication Event Details Admin User Admin Time       Sedation: None. Local Anesthestic used    Findings: Completed removal of left chest TCVC in its entirety.    Estimated Blood Loss: Minimal    SPECIMENS: None    Complications: 1. None     Condition: Stable    Plan: Follow-up per primary team.     Comments: See dictated procedure note for full details.    Jose Stockton PA-C

## 2023-08-08 NOTE — PROGRESS NOTES
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  Postop course complicated by profound deconditioning.  He was admitted to TCU initially on 12/23/2022 for physical rehabilitation, however there was no progress with therapies after several months of in bed therapy and his refusals to mobilize. Underwent biopsy on 4/7/2023 without evidence of infection. He was transferred back to [facility] for left total hip arthroplasty and reimplantation on 5/26/23 with Dr. Meyers and was back in TCU Presented again to [facility] for persistent MRSA bacteremia on 6/17 and has remained inpatient since that time. Ultimately has not been out of hospital or rehab since 11/22/22. Course complicated by encephalopathy and difficulty participating with PT and OT. Dr. Gipson had long discussion with patient and his brother Ivan on 8/5 with goal for regaining strength.     Today:  -No remarkable change of mental status however the patient is reported feeling dry for the last 2 weeks.  No evidence of dehydration on clinical examination, will obtain basic laboratory workup for a.m.  -On escalated prophylactic dosing of DVT Prophylaxis using apixaban 2.5 mg twice daily for life given unprovoked DVT in 2018.  -Hemoglobin was noted by potential ideology without any evidence today given lack of need for dialysis with a stable creatinine for the last 1 week  -Plan for care conference on 8/20          Multifactorial encephalopathy - likely toxic metabolic, uremic, delirium, depression  contributing - slowly improving   Noted to have acutely worsening mental status around ~7/4, which was initially felt to be related to cefepime neurotoxicity. Neurology consulted. EEG without seizures. CT head, ammonia and VBG wnl. Completed 5 days of antibiotics. No clinical or objective findings concerning for worsening infection. Did have worsening renal function and uremia, and so without clear other etiology discussion was that needed to rule out uremia definitively and agreed to pursue HD for toxin clearance and re-assessment of mentation. However, renal functin has improved with uremia down-trending with no improvement in mental status. This is superimposed on several months of refusal to participate in cares. Concern for underlying cognitive decline and uncontrolled depression.  - Delirium precautions  - Nephrology following, not planning to initiate HD. Requested tunneled catheter removal.   - Psychiatry consulted on 7/18  for capacity assessment - pt not decisional regarding complex medical decisions or disposition, if he continues to improve from a mental status standpoint, consider psych re-assessment   - Neurology and Psychiatry consulted 8/4                 = Thiamine 100mg qday                 = Scheduled Melatonin 3mg at bedtime      MRSA bacteremia due to possible left hip septic arthritis vs other source - resolved   Blood cultures: 6/14, 6/15 + for MRSA (4/4), and 6/16 (1/2 GPCs) and 6/17 (2/2) GPCs. - started vancomycin 6/15.   6/17 sinovial fluid cultures with staph and klebsiella. Purulent drainage noted at incision site at TCU, CT left hip concerning for infection (no mention of joint, mostly anterior in soft tissue) and couldn't exclude abscess/inf  - ID intermittently following through hospital course. Last seen 7/21.  - Continue ceftazidime, vancomycin for C.acnes, Pseudomonas, MRSA PJI until 8/1/2023 (completed)   - Start oral doxycycline and ciprofloxacin on 8/2/2023 through 9/13 (total  12 weeks therapy), intermittently refusing, getting about 50%   - ID follow up ~8/21 (can be inpatient team if remains inpatient or can be outpatient)      Acute Kidney Injury / Acute renal failure - multifactorial, see above/below - improving   Nephrology consulted, appreciate recs. Baseline creatinine 1.0 or 1.1. Etiology: suspect combo of sepsis/inflammation, hypovolemia, blood loss anemia following surgery, vancomycin. UA recollected on 7/19 with granular casts suggesting ongoing dense ATN. Creatinine continuing to uptrend, however no acidosis, electrolyte abnormalities or evidence of hypervolemia. Some concern that uremia could be contributing to his encephalopathy. After GOC on 7/21, planned to pursue short trial of dialysis.  - Nephrology consulted  - Initially planned for short trial of HD, has been deferred d/t renal recovery, HD line placed 8/1. On 8/7 nephrology team recommended removal of line.  Tunneled line removed by IR on 8/8  -Please laboratory workup to be obtained on 8/9 AM     Asymptomatic bacteruria (VRE)  UA obtained on 7/19 to assess for casts, reflexed to culture which is growing VRE. Labs and vitals with low concern for new infection. Discussed with ID who recommended against treatment.     Bilateral LE edema due to volume overload from HALEY and iatrogenic from blood transfusions  Hyperesthesia of bilateral lower extremities  Despite sensitivity to light touch, pulses intact, both warm, no evidence of acute issue and patient states pain has been stable for the past month.   - Holding diuresis since 7/20 while renal recovery ongoing      L heel pressure wound, unstagable  Noted earlier in pts hospitalization (~6/20), but at that time he refused assessment. Noted again by nursing to be worsening on 7/19 and WOCN consult was placed. Pt was agreeable and had wound assessed. He has been mostly non-agreeable to offloading or other preventative measures recommended by RN and WOCN, but will sometimes  allow it with significant encouragement.  - WOCN consult  - Continue to encourage pt to utilize offloading techniques and to participate in wound cares.     Hypernatremia - resolved & recurrent   Uptrending recently, 147 on 7/18. Likely volume down. S/p D5W on 7/18, resolved. Elevated again and responded to D5W on 7/29, and again on 8/1  - Monitor.  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 For DVT/PE treatment it appears that dose should be increased if tolerating.      S/p left hip explantation of left hip antibiotic spacer and revision of left total hip arthroplasty (5/26/2023)  - ortho performed washout 6/21  -Activity: 50% PWB LLE with posterior hip precautions [id number]  -AFO ordered for foot drop but patient refusing  -PT/OT  -Knee to be kept even in a foam leg elevator to relax sciatic nerve  -Pain: holding robaxin 4 times a day with mental status changes (discontinuing), APAP PRN  -stop tramadol (7/11) due to renal function  -oxycodone 2.5-5 mg q6h PRN- taking very minimal amts so unlikely to be contributing to mental status     Type 2 diabetes mellitus  - Last hemoglobin A1c 5.6% on 6/16/2023.  On glipizide, metformin, Actos, and Victoza prior to hospitalizations and surgery.   - continue ISS     Constipation, improved  -Continue Colace 100 mg twice daily  -Continue senna 1 tablet twice daily-- had briefly increased to 2 tab twice daily due to lack of stool, now improved  -Continue MiraLAX  "daily     Hypomagnesemia  -monitor and replete as indicated     Severe malnutrition in the context of acute on chronic illness  Goal for patient is to consume % of meals TID. Goal is 5955-4735 kcals/daily. Protein needs- 100-126 grams/daily.   -RD consult and appreciate recs  -regular diet  -Weekly weights, daily I/O     Adjustment disorder with depressed mood  Prolonged grief disorder  Patient has been with depressed mood in setting of ongoing health issues, as evidenced by lack of motivation to work with therapies during both TCU admissions.  Past history of passive suicidal statements. Psychiatry saw patient during hospital admission on 5/31 due to lack of motivation with therapies. Patient declined interest in starting any psychiatric medications. Was willing to engage with health psychology.   -Continue to monitor for willingness to start antidepressant therapy, psych re-consulted on 8/7     HTN:   - Started amlodipine 7/16, increased to 10mg 7/30. Still intermittently hypertensive, monitoring.      Stable and Resolved Issues:  Hyperlipidemia-continue PTA Lipitor 40 mg daily  JAIR-CPAP when sleeping       Diet: Renal Diet (non-dialysis)  Calorie Counts    DVT Prophylaxis: DOAC  Tran Catheter: Not present  Lines: PRESENT      CVC Single Lumen Right Internal jugular Non - valved (open ended);Tunneled;Power injectable-Site Assessment: WDL  [REMOVED] CVC Double Lumen Left Internal jugular Non - valved (open ended);Tunneled-Site Assessment: WDL      Cardiac Monitoring: None  Code Status: Full Code      Clinically Significant Risk Factors            # Hypomagnesemia: Lowest Mg = 1.2 mg/dL in last 2 days, will replace as needed   # Hypoalbuminemia: Lowest albumin = 2.2 g/dL at 7/8/2023  8:51 AM, will monitor as appropriate            # Severe Obesity: Estimated body mass index is 42.51 kg/m  as calculated from the following:    Height as of this encounter: 1.778 m (5' 10\").    Weight as of this encounter: 134.4 " kg (296 lb 4.8 oz).     # Severe Malnutrition: based on nutrition assessment           Disposition Plan      Expected Discharge Date: 08/15/2023        Discharge Comments: Brother Ivan is surrogate. Trial of PT/OT for participation as patient states he wants rehab. Anticipating repeat care conference. Pending care conference as patient states desire for rehab but as of yet has not sufficiently participated in therapy to qualify.        Porfirio Stokes MD  Hospitalist Service, GOLD TEAM 21  [facility]  Securely message with Vocera (more info)  Text page via [facility] Paging/Directory   See signed in provider for up to date coverage information  ______________________________________________________________________    Interval History   The patient denies any active symptoms other than feeling tired for the last 3 weeks.  No reported chest pain, shortness of breath, or subjective fevers.    Physical Exam   Vital Signs: Temp: 97.8  F (36.6  C) Temp src: Oral BP: (!) 166/74 Pulse: 75   Resp: 16 SpO2: 96 % O2 Device: None (Room air)    Weight: 296 lbs 4.77 oz  Physical Exam  Vitals reviewed.   Constitutional:       Comments: Reclined in bed with head of bed elevated eating eggs and oatmeal.    HENT:      Head: Atraumatic.      Nose: Nose normal.   Eyes:      Conjunctiva/sclera: Conjunctivae normal.   Cardiovascular:      Rate and Rhythm: Normal rate.      Pulses: Normal pulses.   Pulmonary:      Effort: Pulmonary effort is normal. No respiratory distress.      Breath sounds: No wheezing or rales.   Abdominal:      General: There is no distension.      Palpations: Abdomen is soft.      Tenderness: There is no abdominal tenderness.   Musculoskeletal:      Right lower leg: No edema.      Left lower leg: No edema.      Comments: Feet in soft boots bilaterally. Significant pain with light touch of skin of left foot, though color normal, no swelling, pulses intact. He states this  has been present a month.    Skin:     General: Skin is warm.      Capillary Refill: Capillary refill takes less than 2 seconds.   Neurological:      Mental Status: He is alert.      Comments: Debility is diffuse. Able to wiggle toes bilaterally, use arms to eat from tray though with some tremor.    Psychiatric:      Comments: Affect somewhat irritable. He expressed confidence that he could walk with people helping him although he has not been out of bed for a very long time.       Medical Decision Making       55 MINUTES SPENT BY ME on the date of service doing chart review, history, exam, documentation & further activities per the note.      Data     I have personally reviewed the following data over the past 24 hrs:    N/A  \   N/A   / N/A     141 106 40.2 (H) /  143 (H)   3.7 25 2.95 (H) \     ALT: N/A AST: N/A AP: N/A TBILI: N/A   ALB: 3.2 (L) TOT PROTEIN: N/A LIPASE: N/A       Imaging results reviewed over the past 24 hrs:   No results found for this or any previous visit (from the past 24 hour(s)).

## 2023-08-08 NOTE — PLAN OF CARE
Pt is remain alert and oriented x4 with intermittent confusion.   Pt denies SOB, chest pain, N/T, N/V. LLE .  Pt had left upper chest CVC removed today.  No BM this shift; urinal at bedside.  Poor appetite; encourage po intake as much as able and calorie counts.  Wound dressing completed by WOC nurse this morning.  BG checks BID.     Plan: Continue with the current POC.

## 2023-08-08 NOTE — PROGRESS NOTES
[facility]  Palliative Care Daily Progress Note       Recommendations & Counseling     GOALS OF CARE:   Restorative without limits   Appreciate efforts of primary team to engage Rahat and Ivan in goals of care discussions. Appears that Rahat's mentation has improved, though he still requires family support for medical decision making.  Today Rahat shared with me his reluctance to discuss hospice and/or palliative care due to a bad experience with hospice care for his mother who passed about 6 years ago. Despite his apprehension about hospice/palliative care, Rahat was willing to speak with me openly about his feelings about hospice and frustrations with hospital care.  Palliative is available to attend upcoming care conference.    ADVANCE CARE PLANNING:  No ACP documents on file  Code status: Full Code    PSYCHOSOCIAL/SPIRITUAL:  Family - supported by longterm girlfriend Ivan Rangel (brother) and Pollo (nephew).  Health Psych considered per previous notes but do not appear to have seen Rahat recently    Palliative Care will continue to follow.    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 11/22/22 for a scheduled explant of the left hip prosthesis, debridement, and reconstruction with antibiotics spacer. His post operative course has been complicated by deconditioning. He was discharged to TCU on 12/23/22 and was subsequently readmitted on 5/26/23 for left total hip arthroplasty and reimplantation and discharged 6/2. He most recently presented again on 6/17 with MRSA bacteremia. His present hospital course has been complicated by HALEY and declining function likely needing dialysis, encephalopathy.     Today, the patient  "was seen for:  [place] building  Emotional support  Goals of care            Interval History:     Chart review/discussion with unit or clinical team members:   - Discussed case with hospitalist yesterday. Plan for care conference 8/10 with brother Ivan.  - Renal function has improved, no indication for HD at this time.  - Mental status has improved, Psych re-consulted for capacity assessment. Rahat has partial capacity to participate in goals of care discussions, requires family support for decision making.    Per patient or family/caregivers today:  Rahat is frustrated with care in the hospital, particularly being woken up early and having to eat hospital food. He prefers \"junk food\" including fast food burgers. Rahat had just finished working with PT/OT when I arrived. He reported it did not go well because they tried to \"sit him up\" but it was too painful for him to tolerate. Pain is primarily in LLE.    Rahat shared extensively about his family and experience with hospice care. He was coherent and answered questions appropriately, though intermittently had trouble with word finding. He is aware of plan for care conference in the coming days and notes his goal is to ride his mower again.            Medications:     Notable for:  Tylenol 975mg q8h  Oxycodone 2.5-5mg q6h prn           Physical Exam:   Vital Signs: Blood pressure (!) 142/72, pulse 73, temperature 97.5  F (36.4  C), temperature source Oral, resp. rate 16, height 1.778 m (5' 10\"), weight 134.4 kg (296 lb 4.8 oz), SpO2 96 %.   GENERAL: laying in bed, NAD, alert and engaged  HEENT: Normocephalic, anicteric sclera, moist mucous membranes  LUNGS: non-labored  NEUROLOGIC: alert and engaged, answers questions appropriately, +mild delayed responses, +mild word finding difficulty  PSYCH: full affect             Data Reviewed:     Reviewed recent pertinent imaging:   No recent imaging    Reviewed recent labs:   CMP  Recent Labs   Lab 08/08/23  1105 08/08/23  0749 " 08/08/23  0738 08/07/23  2158 08/07/23  2106 08/07/23  1203 08/07/23  0915   NA  --   --  141  --   --   --  140   POTASSIUM  --   --  3.7  --   --   --  3.7  3.7   CHLORIDE  --   --  106  --   --   --  106   CO2  --   --  25  --   --   --  24   ANIONGAP  --   --  10  --   --   --  10   * 112* 116*   < >  --    < > 106*   BUN  --   --  40.2*  --   --   --  38.3*   CR  --   --  2.95*  --   --   --  2.81*   GFRESTIMATED  --   --  22*  --   --   --  23*   MAIKOL  --   --  8.5*  --   --   --  8.1*   MAG  --   --  2.2  --  2.1  --  1.2*   PHOS  --   --  3.7  --   --   --  3.5   ALBUMIN  --   --  3.2*  --   --   --  3.1*    < > = values in this interval not displayed.     CBC  Recent Labs   Lab 08/07/23  0915 08/05/23  0744   WBC 7.8 7.9   RBC 2.72* 2.85*   HGB 8.1* 8.5*   HCT 25.5* 26.8*   MCV 94 94   MCH 29.8 29.8   MCHC 31.8 31.7   RDW 17.6* 17.5*    257          Medical Decision Making       60 MINUTES SPENT BY ME on the date of service doing chart review, history, exam, documentation & further activities per the note.

## 2023-08-09 NOTE — PROGRESS NOTES
"Orthopedic Surgery Progress Note: 08/09/2023    Subjective:   NAEON. Reviewed RN and consultant notes. No HD initiation. Deferring some cares. Palliative following, considering hospice. Re-engaging therapy. Encouraged him to move this morning.     Objective:   BP (!) 152/76 (BP Location: Left arm)   Pulse 78   Temp 97.6  F (36.4  C) (Oral)   Resp 16   Ht 1.778 m (5' 10\")   Wt 134.4 kg (296 lb 4.8 oz)   SpO2 96%   BMI 42.51 kg/m    I/O this shift:  In: -   Out: 200 [Urine:200]  General: NAD. Resting comfortably in bed. Glasses  Respiratory: Breathing comfortably on RA.  Musculoskeletal: Focused exam of the left lower extremity: incision well healed. No boot. Fires EHL, FHL, wiggles toes minimally (baseline)    Laboratory Data:  Lab Results   Component Value Date    WBC 7.8 08/07/2023    HGB 8.1 (L) 08/07/2023     08/07/2023    INR 1.34 (H) 08/01/2023      Intraoperative cultures   6/21/2023: MRSA, C Acnes, Pseudomonas    Assessment & Plan:   Waldo Bustos is a 72 year old male status post left revision total hip arthroplasty on 5/26/2023 with Dr. Meyers now presenting with left hip prosthetic joint infection status post left hip irrigation and debridement on 6/21/2023 with Dr. Meyers.    Postoperatively noted to have confusion and increasing creatinine, sodium. Thorough workup by primary, Neuro, renal suggests metabolic encephalopathy. Seems to wax and wane.    Anemia postoperatively. Intermittent transfusions while hospitalized. Good response.    CRP downtrending 40> 30> 18 > 25. C acnes, MRSA in cultures from June. Treating with vanc/cefepime, per ID recs. Appreciate assistance.    Ortho will continue to follow weekly. Please do not hesitate to page with questions/concerns.     Ortho Plan:  - Activity: Up with assist until independent. Posterior hip precautions x 3 months (no flexion beyond 90 degrees, no adduction beyond midline, and no internal rotation beyond midline).  - Weightbearing Status: " WBAT LLE.  - Pain Management: Transition from IV to PO as tolerated.  - Antibiotics: cipro, doxy, per ID/primary  - Diet: OK for a diet from an orthopedic perspective.  - DVT Prophylaxis: SCDs and PTA apixaban okay from an ortho perspective (h/o VTE, unprovoked)  - Imaging: XR hip to be obtained in mid-late August  - Labs: Labs PRN and per primary  - Bracing/Splinting: Abduction pillow or regular pillow while in bed.  - Dressings: Dressing removed. Okay to leave incision to air.   Defer groin, heel wound to RN and WOCN. Appreciate assistance.  - Drains: GUERRERO drain removed 7/3  - Physical Therapy/Occupational Therapy: Evaluation and treatment. Signed off week of 7/12 due to patient lack of participation.  - Cultures: Intraoperative cultures 6/21/2023.  - Follow-up: Clinic with Dr. Meyers's team TBD (pending dispo plan). Will plan to get XR at that time.    - Disposition: TCU. Okay to discharge from an ortho perspective.     Hannah Starr MD, PGY-4  Orthopedics  Pager: [phone]

## 2023-08-09 NOTE — PROGRESS NOTES
"  VS: BP (!) 154/69 (BP Location: Left arm, Patient Position: Supine, Cuff Size: Adult Large)   Pulse 89   Temp 97.4  F (36.3  C) (Oral)   Resp 16   Ht 1.778 m (5' 10\")   Wt 134.4 kg (296 lb 4.8 oz)   SpO2 96%   BMI 42.51 kg/m      O2: RA   Output: Incont of urine   Last BM: Incont of stool 8/5   Activity: Bed bound   Skin: See wound care charting   Pain: Intermittent in neck and L leg area   CMS: intact   Dressing: none   Diet: renal   LDA: PICC in R chest area, single lumen   Equipment: Ceiling lift   Plan: Continue to monitor   Additional Info:         Rahat is much more alert today but refusing assessments on L heel area.  Rahat refuses to eat today and did not have breakfast or lunch.  His brother visited and was going to order him dinner.  He declined his senna and AM Tylenol.  He was offered oxycodone but declined to take any.      "

## 2023-08-09 NOTE — PROGRESS NOTES
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  Postop course complicated by profound deconditioning.  He was admitted to TCU initially on 12/23/2022 for physical rehabilitation, however there was no progress with therapies after several months of in bed therapy and his refusals to mobilize. Underwent biopsy on 4/7/2023 without evidence of infection. He was transferred back to [facility] for left total hip arthroplasty and reimplantation on 5/26/23 with Dr. Meyers and was back in TCU Presented again to [facility] for persistent MRSA bacteremia on 6/17 and has remained inpatient since that time. Ultimately has not been out of hospital or rehab since 11/22/22. Course complicated by encephalopathy and difficulty participating with PT and OT. Dr. Gipson had long discussion with patient and his brother Ivna on 8/5 with goal for regaining strength.     A.  Medical problems being managed today  Newly reported left lower extremity paresis, started 7/9/2023, not present on admission  I ordered CT head for a.m. since results of the CT would not change the management overnight.    Left heel pain likely secondary to hospital-acquired unstageable pressure injury, not present on admission  Since this was noted more than 2 weeks ago, with the significant pain I am concerned for osteomyelitis.  I ordered an x-ray since that by this time the x-ray would have enough sensitivity to detect evidence of osteomyelitis.  In terms of pain management, I started Voltaren cream  around but not at the site of the wound while continuing acetaminophen 1 g every 8 hours.  I am also switched narcotics from oxycodone to tramadol. The patient is in agreement of reduction of narcotics.    Neck pain and discomfort on turning the head likely secondary to muscular pain, not present on admission  I ordered x-ray to rule out any fractures which is less likely.  I also ordered methocarbamol 500 mg nightly if that would help.  Although the dose support tizanidine, the patient has history of allergy to tizanidine as such I would use methocarbamol.    Care Conference  Plan for care conference on 8/11 at 1 PM.  I am highly appreciative to the case management/social work team and assistance of a scheduling this meeting.    B. Medical problems managed during this hospitalization  Multifactorial encephalopathy - likely toxic metabolic, uremic, delirium, depression contributing - slowly improving   Noted to have acutely worsening mental status around ~7/4, which was initially felt to be related to cefepime neurotoxicity. Neurology consulted. EEG without seizures. CT head, ammonia and VBG wnl. Completed 5 days of antibiotics. No clinical or objective findings concerning for worsening infection. Did have worsening renal function and uremia, and so without clear other etiology discussion was that needed to rule out uremia definitively and agreed to pursue HD for toxin clearance and re-assessment of mentation. However, renal functin has improved with uremia down-trending with no improvement in mental status. This is superimposed on several months of refusal to participate in cares. Concern for underlying cognitive decline and uncontrolled depression.  - Delirium precautions  - Nephrology following, not planning to initiate HD. Requested tunneled catheter removal.   - Psychiatry consulted on 7/18  for capacity assessment - pt not decisional regarding complex medical decisions or disposition, if he continues to  improve from a mental status standpoint, consider psych re-assessment   - Neurology and Psychiatry consulted 8/4                 = Thiamine 100mg qday                 = Scheduled Melatonin 3mg at bedtime      MRSA bacteremia due to possible left hip septic arthritis vs other source - resolved   Blood cultures: 6/14, 6/15 + for MRSA (4/4), and 6/16 (1/2 GPCs) and 6/17 (2/2) GPCs. - started vancomycin 6/15.   6/17 sinovial fluid cultures with staph and klebsiella. Purulent drainage noted at incision site at TCU, CT left hip concerning for infection (no mention of joint, mostly anterior in soft tissue) and couldn't exclude abscess/inf  - ID intermittently following through hospital course. Last seen 7/21.  - Continue ceftazidime, vancomycin for C.acnes, Pseudomonas, MRSA PJI until 8/1/2023 (completed)   - Start oral doxycycline and ciprofloxacin on 8/2/2023 through 9/13 (total 12 weeks therapy), intermittently refusing, getting about 50%   - ID follow up ~8/21 (can be inpatient team if remains inpatient or can be outpatient)      Acute Kidney Injury / Acute renal failure - multifactorial, see above/below - improving   Nephrology consulted, appreciate recs. Baseline creatinine 1.0 or 1.1. Etiology: suspect combo of sepsis/inflammation, hypovolemia, blood loss anemia following surgery, vancomycin. UA recollected on 7/19 with granular casts suggesting ongoing dense ATN. Creatinine continuing to uptrend, however no acidosis, electrolyte abnormalities or evidence of hypervolemia. Some concern that uremia could be contributing to his encephalopathy. After GOC on 7/21, planned to pursue short trial of dialysis.  - Nephrology consulted  - Initially planned for short trial of HD, has been deferred d/t renal recovery, HD line placed 8/1. On 8/7 nephrology team recommended removal of line.  Tunneled line removed by IR on 8/8  -Please laboratory workup to be obtained on 8/9 AM     Asymptomatic bacteruria (VRE)  UA obtained on 7/19  to assess for casts, reflexed to culture which is growing VRE. Labs and vitals with low concern for new infection. Discussed with ID who recommended against treatment.     Bilateral LE edema due to volume overload from HALEY and iatrogenic from blood transfusions  Hyperesthesia of bilateral lower extremities  Despite sensitivity to light touch, pulses intact, both warm, no evidence of acute issue and patient states pain has been stable for the past month.   - Holding diuresis since 7/20 while renal recovery ongoing      L heel pressure wound, unstagable  Noted earlier in pts hospitalization (~6/20), but at that time he refused assessment. Noted again by nursing to be worsening on 7/19 and WOCN consult was placed. Pt was agreeable and had wound assessed. He has been mostly non-agreeable to offloading or other preventative measures recommended by RN and WOCN, but will sometimes allow it with significant encouragement.  - WOCN consult  - Continue to encourage pt to utilize offloading techniques and to participate in wound cares.     Hypernatremia - resolved & recurrent   Uptrending recently, 147 on 7/18. Likely volume down. S/p D5W on 7/18, resolved. Elevated again and responded to D5W on 7/29, and again on 8/1  - Monitor.  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 For DVT/PE treatment it appears that dose should be increased if tolerating.      S/p left hip explantation of left hip antibiotic spacer and  revision of left total hip arthroplasty (5/26/2023)  - ortho performed washout 6/21  -Activity: 50% PWB LLE with posterior hip precautions [id number]  -AFO ordered for foot drop but patient refusing  -PT/OT  -Knee to be kept even in a foam leg elevator to relax sciatic nerve  -Pain: holding robaxin 4 times a day with mental status changes (discontinuing), APAP PRN  -stop tramadol (7/11) due to renal function  -oxycodone 2.5-5 mg q6h PRN- taking very minimal amts so unlikely to be contributing to mental status     Type 2 diabetes mellitus  - Last hemoglobin A1c 5.6% on 6/16/2023.  On glipizide, metformin, Actos, and Victoza prior to hospitalizations and surgery.   - continue ISS     Constipation, improved  -Continue Colace 100 mg twice daily  -Continue senna 1 tablet twice daily-- had briefly increased to 2 tab twice daily due to lack of stool, now improved  -Continue MiraLAX daily     Hypomagnesemia  -monitor and replete as indicated     Severe malnutrition in the context of acute on chronic illness  Goal for patient is to consume % of meals TID. Goal is 0657-9208 kcals/daily. Protein needs- 100-126 grams/daily.   -RD consult and appreciate recs  -regular diet  -Weekly weights, daily I/O     Adjustment disorder with depressed mood  Prolonged grief disorder  Patient has been with depressed mood in setting of ongoing health issues, as evidenced by lack of motivation to work with therapies during both TCU admissions.  Past history of passive suicidal statements. Psychiatry saw patient during hospital admission on 5/31 due to lack of motivation with therapies. Patient declined interest in starting any psychiatric medications. Was willing to engage with health psychology.   -Continue to monitor for willingness to start antidepressant therapy, psych re-consulted on 8/7     HTN:   - Started amlodipine 7/16, increased to 10mg 7/30. Still intermittently hypertensive, monitoring.      Stable and Resolved  "Issues:  Hyperlipidemia-continue PTA Lipitor 40 mg daily  JAIR-CPAP when sleeping       Diet: Renal Diet (non-dialysis)  Calorie Counts    DVT Prophylaxis: DOAC  Tran Catheter: Not present  Lines: PRESENT      CVC Single Lumen Right Internal jugular Non - valved (open ended);Tunneled;Power injectable-Site Assessment: WDL      Cardiac Monitoring: None  Code Status: Full Code      Clinically Significant Risk Factors          # Hypocalcemia: Lowest Ca = 8.3 mg/dL in last 2 days, will monitor and replace as appropriate     # Hypoalbuminemia: Lowest albumin = 2.2 g/dL at 7/8/2023  8:51 AM, will monitor as appropriate            # Severe Obesity: Estimated body mass index is 42.51 kg/m  as calculated from the following:    Height as of this encounter: 1.778 m (5' 10\").    Weight as of this encounter: 134.4 kg (296 lb 4.8 oz).     # Severe Malnutrition: based on nutrition assessment           Disposition Plan      Expected Discharge Date: 08/16/2023, 12:00 PM      Discharge Comments: Care conference 8/11 at 1 pm likely will need long term placement Pending care conference as patient states desire for rehab but as of yet has not sufficiently participated in therapy to qualify.        Porfirio Stokes MD  Hospitalist Service, GOLD TEAM 21  [facility]  Securely message with Everlater (more info)  Text page via [employer] Paging/Directory   See signed in provider for up to date coverage information  ______________________________________________________________________    Interval History   The patient reported left lower extremity weakness.  No paresis or paralysis otherwise reported.  The patient reported left heel pain that is disturbing him quite a bit.  It is mild to moderate in nature without specific relieving or exacerbating factors.  No subjective fever or chills.  The patient is reported neck pain that increases on moving his neck.    Physical Exam   Vital Signs: Temp: 97.5  F " (36.4  C) Temp src: Oral BP: (!) 140/56 Pulse: 103   Resp: 16 SpO2: 98 % O2 Device: None (Room air)    Weight: 296 lbs 4.77 oz  Physical Exam  Vitals reviewed.   Constitutional:       Comments: Reclined in bed with head of bed elevated eating eggs and oatmeal.    HENT:      Head: Atraumatic.      Nose: Nose normal.   Eyes:      Conjunctiva/sclera: Conjunctivae normal.   Cardiovascular:      Rate and Rhythm: Normal rate.      Pulses: Normal pulses.   Pulmonary:      Effort: Pulmonary effort is normal. No respiratory distress.      Breath sounds: No wheezing or rales.   Abdominal:      General: There is no distension.      Palpations: Abdomen is soft.      Tenderness: There is no abdominal tenderness.   Musculoskeletal:      Right lower leg: No edema.      Left lower leg: No edema.      Comments: Feet in soft boots bilaterally. Significant pain with light touch of skin of left foot, though color normal, no swelling, pulses intact. He states this has been present a month.    Skin:     General: Skin is warm.      Capillary Refill: Capillary refill takes less than 2 seconds.   Neurological:      Mental Status: He is alert.      Comments: Debility is diffuse. Able to wiggle toes bilaterally, use arms to eat from tray though with some tremor.    Psychiatric:      Comments: Affect somewhat irritable. He expressed confidence that he could walk with people helping him although he has not been out of bed for a very long time.       Medical Decision Making       55 MINUTES SPENT BY ME on the date of service doing chart review, history, exam, documentation & further activities per the note.      Data     I have personally reviewed the following data over the past 24 hrs:    7.2  \   7.9 (L)   / 271     142 106 38.9 (H) /  184 (H)   3.6 25 2.82 (H) \     ALT: 8 AST: 12 AP: 80 TBILI: 0.2   ALB: 3.2 (L) TOT PROTEIN: 6.3 (L) LIPASE: N/A     Procal: 0.27 (H) CRP: 7.49 (H) Lactic Acid: 0.6 (L)         Imaging results reviewed over the  past 24 hrs:   No results found for this or any previous visit (from the past 24 hour(s)).

## 2023-08-09 NOTE — PLAN OF CARE
Goal Outcome Evaluation:       VSS except sl elevated BP. Pt a/o x3, no confusion noted but has intermittent confusion at times.Pt allowed staff to change bed linens and was able to give patient a bed bath. Not able to check wound on left groin area as pt refused. Mepilex dressing intact L heel wound. Buttocks has blanchable redness. Pt declined to order room service but had his girlfriend order [employer] and pt ate a whole burger. Reports minimal pain at rest and just c/o left leg tenderness when leg is touched.Pt likes to direct cares.Pt denies other acute complaints. Able to make needs known this shift. Follow POC.

## 2023-08-09 NOTE — PROGRESS NOTES
Care Management Follow Up    Length of Stay (days): 53    Expected Discharge Date: 08/15/2023     Concerns to be Addressed: Goals of Care Care Conference  Patient plan of care discussed at interdisciplinary rounds: Yes    Anticipated Discharge Disposition:  TCU     Anticipated Discharge Services:  post-acute rehab services  Anticipated Discharge DME:  TBD    Patient/family educated on Medicare website which has current facility and service quality ratings:    Education Provided on the Discharge Plan:    Patient/Family in Agreement with the Plan:      Referrals Placed by CM/SW:    Private pay costs discussed: Not applicable    Additional Information:  Per 5 Ortho rounds and Gold 21, team would like to schedule a GOC Care Conference with palliative, pt and pt brother in attendance. Per PT, pt has been an Ax2. Per internal hand off, pt nephew (Pollo) is continuing to assist pt with reapplying for MA. Pollo is working on sending requested documentation to the [facility] and hopes to have this to the [facility] by the end of this week.     ZEB spoke with pt brother (Ivan) over the phone to discuss GOC CC. Ivan reported that he did speak with a doctor about a Care Conference tomorrow, however, Ivan unable to come to the hospital tomorrow. Ivan requested that the GOC CC be rescheduled to Friday (8/11) at 1300. ZEB reported that SW would confirm with team to ensure they could attend and unless Ivan hears back from SW should plan on CC being on Friday, 8/11 at 1300.     ZEB spoke with Palliative (Karen Skinner PA-C) who confirmed that someone from their team would plan to attend CC on 8/11.     SW spoke with primary team (Luis Villatoro, Porfirio Stokes MD) and confirmed CC day and time worked for them.     Justina Lutz, ZEB  [facility]   [facility]   5O  Ph: [phone]

## 2023-08-09 NOTE — PLAN OF CARE
"VS: BP (!) 152/76 (BP Location: Left arm)   Pulse 78   Temp 97.6  F (36.4  C) (Oral)   Resp 16   Ht 1.778 m (5' 10\")   Wt 134.4 kg (296 lb 4.8 oz)   SpO2 96%   BMI 42.51 kg/m      O2: O2 SATS >90% denies chest pain,  or SBO   Output: Voids adequately  incontinent of bowel and bladder  but uses urinal at times    Last BM: 8/5/23 BS present all x4 quadrants    Activity: Not OOB this shift Assist of  2 with cares uses Lift device for transfers allowed repositioning x1    Up for meals? N/A   Skin: L hip surgical incision, healing scaling/ scab to BLE    Left heel pressure ulcer  Blanchable redness to IT and groin area   Bruise to left upper arm   Pain: C/O pain  but declines pain medication   CMS: AO to self intermittent confusion at times     Dressing: CDI    Diet: Renal diet diabetic with BG checks and sliding scale    LDA: Right CVC, Heparin locked.    Equipment: IV Pole,  celling  lift personal belongings   Plan: Continue with plan of care    Additional Info:                             "

## 2023-08-09 NOTE — PHARMACY-CONSULT NOTE
Pharmacy consulted to assess for medications associated with change of mental status.    The following medications could cause mental status changes:    Ciprofloxacin: associated with increased risk of confusion, disorientation, disturbances in attention, insomnia, nightmares, memory impairment, seizures, increased intracranial pressure, dizziness, tremors, toxic psychosis, hallucinations, paranoia, depression, anxiety, agitation, and delirium; particularly in older patients or patients with reduced renal function.    Patient started on renally-dosed ciprofloxacin on 8/2/23, dose was increased on 8/5/23 due to improvement in renal function.    Oxycodone: can cause somnolence, agitation, confusion.     Patient has only received 2 doses in past 7 days, last dose on 8/7/23    Haldol: can cause encephalopathy, somnolence.    Patient has not received any doses    Methocarbamol: cause cause CNS depression.    Restarted 8/9/23, patient has not received any doses since 7/16/23      Beth Rivera, PharmD, BCPS

## 2023-08-10 NOTE — PHARMACY-CONSULT NOTE
Date of Service August 10, 2023  Patient's  1950  72 year old, male    After reviewing Waldo's medications, he does not have many medications that are likely to contribute to altered mental status.     Methocarbamol - instance have been reported of dizziness, drowsiness, confusion  -- This is unlikely to be the cause of altered mental status as it was started after but could have additive effects.  Antibiotics have been reported in some instances to contribute to altered mental status however it is rare.    Dee Comer, PharmD

## 2023-08-10 NOTE — PROGRESS NOTES
"Care Management Follow Up    Length of Stay (days): 54    Expected Discharge Date: TBD     Concerns to be Addressed: Discharge planning  Patient plan of care discussed at interdisciplinary rounds: Yes    Anticipated Discharge Disposition:  TCU vs LTC     Anticipated Discharge Services: Post acute therapies, if pt is able and willing to continuously pariticipate  Anticipated Discharge DME:  None    Patient/family educated on Medicare website which has current facility and service quality ratings:  Not at this time  Education Provided on the Discharge Plan:  Yes  Patient/Family in Agreement with the Plan:  Yes    Referrals Placed by CM/SW:  None at this time.  Private pay costs discussed: MA for LTC eligibility and necessity to secure payment for TCU/LTC previously discussed during pt's 05/26/23 - 06/02/23 hospitalization. Pt's Financial POA, Pollo Bustos, has been working on pt's MA for LTC application.    Additional Information:  ZEB in communication with  TCU SW re: pt's current status and MA for LTC application status.  TCU SW reported that Pollo had stated he would \"work on getting pt to sign AVS and Auth Rep form this week.\" Pt has been A1 with therapy and A2 with lift for RN cares. ZEB inquired with  TCU SW if [facility] liaison would be able to attend  tomorrow to review for TCU or LTACH.    ZEB reached out to , Catrachito Dooley, via email, requesting if he could verify with [facility] that MA for LTC application is indeed being processed.    ZEB emailed Pollo ([email or web address]), asking if it would work for him to fax or email SW the AVS and Authorized Representative form for SW have pt sign.      1510: ZEB received confirmation from  TCU SW that [facility] Admissions liaison will be present at  tomorrow (08/11/23) at 1:00 p.m. in pt's room.    ZEB has not received correspondence from  or Financial POA yet.        ANTONETTE MinorW, LSW  5 [facility]   PHONE: [phone]  Pager: [phone]   "

## 2023-08-10 NOTE — PROGRESS NOTES
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  Postop course complicated by profound deconditioning.  He was admitted to TCU initially on 12/23/2022 for physical rehabilitation, however there was no progress with therapies after several months of in bed therapy and his refusals to mobilize. Underwent biopsy on 4/7/2023 without evidence of infection. He was transferred back to [facility] for left total hip arthroplasty and reimplantation on 5/26/23 with Dr. Meyers and was back in TCU Presented again to [facility] for persistent MRSA bacteremia on 6/17 and has remained inpatient since that time. Ultimately has not been out of hospital or rehab since 11/22/22. Course complicated by encephalopathy and difficulty participating with PT and OT. Dr. Gipson had long discussion with patient and his brother Ivan on 8/5 with goal for regaining strength.     A.  Medical problems being managed today  Left heel pain likely secondary to hospital-acquired unstageable pressure injury with possible acute osteomyelitis, not present on admission  X-ray with possible osteomyelitis that is acute versus artifact.  Since the patient is already on antibiotics with ciprofloxacin and doxycycline I will hold off on confirmatory MRI and obtain inflammatory markers in 72 hours.  If this symptomatology improves and inflammatory markers trended down in 72 hours then I would continue to obtain inflammatory markers with CRP once weekly to ensure that the antibiotics are covering all possibilities of bone  infections including the osteomyelitis possible    Newly reported left lower extremity paresis, started 7/9/2023, not present on admission  Awaiting CT head    Neck pain and discomfort on turning the head likely secondary to muscular pain, not present on admission  Awaiting x-ray and continue methocarbamol at night    Care Conference  Plan for care conference on 8/11 at 1 PM.  I am highly appreciative to the case management/social work team and assistance of a scheduling this meeting.    B. Medical problems managed during this hospitalization  Multifactorial encephalopathy - likely toxic metabolic, uremic, delirium, depression contributing - slowly improving   Noted to have acutely worsening mental status around ~7/4, which was initially felt to be related to cefepime neurotoxicity. Neurology consulted. EEG without seizures. CT head, ammonia and VBG wnl. Completed 5 days of antibiotics. No clinical or objective findings concerning for worsening infection. Did have worsening renal function and uremia, and so without clear other etiology discussion was that needed to rule out uremia definitively and agreed to pursue HD for toxin clearance and re-assessment of mentation. However, renal functin has improved with uremia down-trending with no improvement in mental status. This is superimposed on several months of refusal to participate in cares. Concern for underlying cognitive decline and uncontrolled depression.  - Delirium precautions  - Nephrology following, not planning to initiate HD. Requested tunneled catheter removal.   - Psychiatry consulted on 7/18  for capacity assessment - pt not decisional regarding complex medical decisions or disposition, if he continues to improve from a mental status standpoint, consider psych re-assessment   - Neurology and Psychiatry consulted 8/4                 = Thiamine 100mg qday                 = Scheduled Melatonin 3mg at bedtime      MRSA bacteremia due to possible left hip  septic arthritis vs other source - resolved   Blood cultures: 6/14, 6/15 + for MRSA (4/4), and 6/16 (1/2 GPCs) and 6/17 (2/2) GPCs. - started vancomycin 6/15.   6/17 sinovial fluid cultures with staph and klebsiella. Purulent drainage noted at incision site at TCU, CT left hip concerning for infection (no mention of joint, mostly anterior in soft tissue) and couldn't exclude abscess/inf  - ID intermittently following through hospital course. Last seen 7/21.  - Continue ceftazidime, vancomycin for C.acnes, Pseudomonas, MRSA PJI until 8/1/2023 (completed)   - Start oral doxycycline and ciprofloxacin on 8/2/2023 through 9/13 (total 12 weeks therapy), intermittently refusing, getting about 50%   - ID follow up ~8/21 (can be inpatient team if remains inpatient or can be outpatient)      Acute Kidney Injury / Acute renal failure - multifactorial, see above/below - improving   Nephrology consulted, appreciate recs. Baseline creatinine 1.0 or 1.1. Etiology: suspect combo of sepsis/inflammation, hypovolemia, blood loss anemia following surgery, vancomycin. UA recollected on 7/19 with granular casts suggesting ongoing dense ATN. Creatinine continuing to uptrend, however no acidosis, electrolyte abnormalities or evidence of hypervolemia. Some concern that uremia could be contributing to his encephalopathy. After GOC on 7/21, planned to pursue short trial of dialysis.  - Nephrology consulted  - Initially planned for short trial of HD, has been deferred d/t renal recovery, HD line placed 8/1. On 8/7 nephrology team recommended removal of line.  Tunneled line removed by IR on 8/8  -Please laboratory workup to be obtained on 8/9 AM     Asymptomatic bacteruria (VRE)  UA obtained on 7/19 to assess for casts, reflexed to culture which is growing VRE. Labs and vitals with low concern for new infection. Discussed with ID who recommended against treatment.     Bilateral LE edema due to volume overload from HALEY and iatrogenic from blood  transfusions  Hyperesthesia of bilateral lower extremities  Despite sensitivity to light touch, pulses intact, both warm, no evidence of acute issue and patient states pain has been stable for the past month.   - Holding diuresis since 7/20 while renal recovery ongoing      L heel pressure wound, unstagable  Noted earlier in pts hospitalization (~6/20), but at that time he refused assessment. Noted again by nursing to be worsening on 7/19 and WOCN consult was placed. Pt was agreeable and had wound assessed. He has been mostly non-agreeable to offloading or other preventative measures recommended by RN and WOCN, but will sometimes allow it with significant encouragement.  - WOCN consult  - Continue to encourage pt to utilize offloading techniques and to participate in wound cares.     Hypernatremia - resolved & recurrent   Uptrending recently, 147 on 7/18. Likely volume down. S/p D5W on 7/18, resolved. Elevated again and responded to D5W on 7/29, and again on 8/1  - Monitor.  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 For DVT/PE treatment it appears that dose should be increased if tolerating.      S/p left hip explantation of left hip antibiotic spacer and revision of left total hip arthroplasty (5/26/2023)  - ortho performed washout 6/21  -Activity: 50% PWB LLE with posterior hip precautions [id number]  -AFO ordered for foot drop but patient refusing  -PT/OT  -Knee to be kept even in a foam leg  elevator to relax sciatic nerve  -Pain: holding robaxin 4 times a day with mental status changes (discontinuing), APAP PRN  -stop tramadol (7/11) due to renal function  -oxycodone 2.5-5 mg q6h PRN- taking very minimal amts so unlikely to be contributing to mental status     Type 2 diabetes mellitus  - Last hemoglobin A1c 5.6% on 6/16/2023.  On glipizide, metformin, Actos, and Victoza prior to hospitalizations and surgery.   - continue ISS     Constipation, improved  -Continue Colace 100 mg twice daily  -Continue senna 1 tablet twice daily-- had briefly increased to 2 tab twice daily due to lack of stool, now improved  -Continue MiraLAX daily     Hypomagnesemia  -monitor and replete as indicated     Severe malnutrition in the context of acute on chronic illness  Goal for patient is to consume % of meals TID. Goal is 2660-6764 kcals/daily. Protein needs- 100-126 grams/daily.   -RD consult and appreciate recs  -regular diet  -Weekly weights, daily I/O     Adjustment disorder with depressed mood  Prolonged grief disorder  Patient has been with depressed mood in setting of ongoing health issues, as evidenced by lack of motivation to work with therapies during both TCU admissions.  Past history of passive suicidal statements. Psychiatry saw patient during hospital admission on 5/31 due to lack of motivation with therapies. Patient declined interest in starting any psychiatric medications. Was willing to engage with health psychology.   -Continue to monitor for willingness to start antidepressant therapy, psych re-consulted on 8/7     HTN:   - Started amlodipine 7/16, increased to 10mg 7/30. Still intermittently hypertensive, monitoring.      Stable and Resolved Issues:  Hyperlipidemia-continue PTA Lipitor 40 mg daily  JAIR-CPAP when sleeping       Diet: Renal Diet (non-dialysis)    DVT Prophylaxis: DOAC  Tran Catheter: Not present  Lines: PRESENT      CVC Single Lumen Right Internal jugular Non - valved (open  "ended);Tunneled;Power injectable-Site Assessment: WDL        Cardiac Monitoring: None  Code Status: Full Code      Clinically Significant Risk Factors          # Hypocalcemia: Lowest Ca = 8.3 mg/dL in last 2 days, will monitor and replace as appropriate     # Hypoalbuminemia: Lowest albumin = 2.2 g/dL at 7/8/2023  8:51 AM, will monitor as appropriate            # Severe Obesity: Estimated body mass index is 42.51 kg/m  as calculated from the following:    Height as of this encounter: 1.778 m (5' 10\").    Weight as of this encounter: 134.4 kg (296 lb 4.8 oz).     # Severe Malnutrition: based on nutrition assessment           Disposition Plan      Expected Discharge Date: 08/15/2023, 12:00 PM      Discharge Comments: Care conference 8/11 at 1 pm likely will need long term placement Pending care conference as patient states desire for rehab but as of yet has not sufficiently participated in therapy to qualify.        Porfirio Stokes MD  Hospitalist Service, GOLD TEAM 21  [facility]  Securely message with [employer] (more info)  Text page via [facility] Paging/Directory   See signed in provider for up to date coverage information  ______________________________________________________________________    Interval History   The patient mild improvement in his left heel pain with the application of Voltaren gel.  No subjective fever or chills reported    Physical Exam   Vital Signs: Temp: 97.5  F (36.4  C) Temp src: Oral BP: 136/62 Pulse: 74   Resp: 16 SpO2: 98 % O2 Device: None (Room air)    Weight: 296 lbs 4.77 oz  Physical Exam  Vitals reviewed.   Constitutional:       Comments: Reclined in bed with head of bed elevated eating eggs and oatmeal.    HENT:      Head: Atraumatic.      Nose: Nose normal.   Eyes:      Conjunctiva/sclera: Conjunctivae normal.   Cardiovascular:      Rate and Rhythm: Normal rate.      Pulses: Normal pulses.   Pulmonary:      Effort: Pulmonary effort is " normal. No respiratory distress.      Breath sounds: No wheezing or rales.   Abdominal:      General: There is no distension.      Palpations: Abdomen is soft.      Tenderness: There is no abdominal tenderness.   Musculoskeletal:      Right lower leg: No edema.      Left lower leg: No edema.      Comments: Feet in soft boots bilaterally. Significant pain with light touch of skin of left foot, though color normal, no swelling, pulses intact. He states this has been present a month.    Skin:     General: Skin is warm.      Capillary Refill: Capillary refill takes less than 2 seconds.   Neurological:      Mental Status: He is alert.      Comments: Debility is diffuse. Able to wiggle toes bilaterally, use arms to eat from tray though with some tremor.    Psychiatric:      Comments: Affect somewhat irritable. He expressed confidence that he could walk with people helping him although he has not been out of bed for a very long time.       Medical Decision Making       55 MINUTES SPENT BY ME on the date of service doing chart review, history, exam, documentation & further activities per the note.      Data     I have personally reviewed the following data over the past 24 hrs:    N/A  \   N/A   / N/A     141 106 42.1 (H) /  117 (H)   3.7 25 2.82 (H) \       ALT: N/A AST: N/A AP: N/A TBILI: N/A   ALB: 3.2 (L) TOT PROTEIN: N/A LIPASE: N/A       Imaging results reviewed over the past 24 hrs:   Recent Results (from the past 24 hour(s))   XR Calcaneus Left G/E 2 Views    Narrative    EXAM: XR CALCANEUS LEFT G/E 2 VIEWS  LOCATION: [facility]  DATE: 8/10/2023    INDICATION: Left calcaneal and.  COMPARISON: None available.      Impression    IMPRESSION: Wound dressing overlies the lateral aspect of the left heel and calcaneus, limiting osseous detail. There is the appearance of subtle cortical loss at the lateral aspect of the calcaneus, suspicious for underlying erosion in the setting  of   acute osteomyelitis. However, this could be artifactual, due to the overlying wound dressing. Correlation with MR imaging is recommended.    Mild polyarticular osteoarthritis. No radiographic evidence of acute fracture or malalignment. Diffuse vascular calcifications.

## 2023-08-10 NOTE — PLAN OF CARE
"  VS: BP (!) 152/67 (BP Location: Left arm, Patient Position: Semi-Fuentes's)   Pulse 81   Temp 97.6  F (36.4  C) (Oral)   Resp 17   Ht 1.778 m (5' 10\")   Wt 134.4 kg (296 lb 4.8 oz)   SpO2 97%   BMI 42.51 kg/m       O2: Stable on room air>90%. Denies SOB or chest pain    Output: Using urinal at bedside   Last BM: 8/5/23- bowel sounds active   Activity: Not OOB, Assist of 2 with cares and transfers with lift device   Skin: Left hip incision-healing  Left heel pressure ulcer  Blanchable redness to IT and groin area and bruise to left upper arm  Scabs to BLE   Pain: Managed with scheduled Tylenol  and PRN tramadol   CMS: AxO X 3, Disoriented to time   Dressing: L hip- open to air  Left heel mepilex- UTV   Diet: Renal diet      LDA: Right chest CVC single lumen      Equipment: Pt belongings, IV pole and pump   Plan: Count with POC  RN Managed MG, K+  Care conference done 8/11 at 1pm    Additional Info: X-ray of left heel to rule out osteomyelitis schedule for today  X-ray of neck to rule out any fractures schedule for today  CT head for LLE paresis schedule for today  Care Conference on 8/11 at 1 PM                            "

## 2023-08-11 NOTE — PLAN OF CARE
"Goal Outcome Evaluation:    Pt A&Ox3, disoriented to time. Pt denies SOB, CP, nausea, vomiting, diarrhea, constipation, numbness and tingling. Pain controlled with scheduled Tylenol and Voltaren gel. Continent this shift, pt uses urinal, tried to use commode but too uncomfortable and went back to bed to use bedpan, last this shift 8/10. Perineum care performed, barrier cream applied. L hip incision healed. CVC non tunneled, heparin locked. Care conference scheduled for tomorrow (8/11) 1pm. Renal diet, thin liquids. Liko lift x2-3 for transfers (one person to hold leg d/t pain).    Pt took most of meds this shift, refused \"the red and blue pills\" meaning the Senna and the Doxycycline. Pt had an xray and CT scan this shift.    Continue to monitor, continue to manage pain.      "

## 2023-08-11 NOTE — PLAN OF CARE
"PT: New order seen for \"help with passive movement of the neck to help relieve muscle pain\". Patient currently being followed by OT only and will continue to be one discipline appropriate. If patient with increased neck pain this can be addressed by them however patient does not need assistance with passive movement as he is able to actively move his neck. Also of note anticipate this will improve as patient agreeable to mobilize more and spend more time out of bed as per plan. Will defer on this order at this time but remain open to re-consult when patient is appropriate for inpatient physical therapy meaning he is engaged in consistent mobility and working on actively progressing to where he would benefit from 2 disciplines.                         "

## 2023-08-11 NOTE — CARE CONFERENCE
"Care Conference    Care conference was held on August 11, 2023 in pt's room. Purpose of the conference was to discuss GOC and discharge planning.    Attending the conference were:   Patient  Ivan - pt's brother and surrogate decision-maker  Gerry \"Lalo\" - pt's brother (via phone)  Claire - pt's significant other (via phone)  Dr. Stokes, Hospitalist  Dr. Rooney, Palliative  Bedside RN  Unit   [facility] Admissions Liaison   TCU DON   TCU PCS      Summary:    IDT thoroughly discussed pt's current medical status. Pt is nearing medical readiness to discharge. Pt was much more engaged in discussion today compared to last care conference, although remains non-decisional for discharge decisions, per Psychiatry assessment on 08/07/23. Pt and family were encouraged to consider long-term goals and QOL planning for pt. Education was provided on Medicare guidelines and the difference between therapies under Medicare part A and part B, and the importance of continuous participation in PT/OT.  Admission Liaison clarified that pt still has 65 Medicare days left. Ivan reported that he brought MA forms for pt to sign today, and that these were the remaining documents the [facility] is needing to process pt's MA for LTC application.    SW remained in pt's room for more thorough discussion of LTC after IDT left. SW explained the levels of care MA for LTC pays for (PCA/homemaking, GH, detention, and SNF), also provided education on EW. Family reported that pt would need to be able to do stairs in order to return home (bathroom is not on the main level); also confirmed they would not be able to provide 24 hr care and supervision (pt's current LOC). Pt and family expressed understanding that pt will likely not be able to rehab to home from  TCU and LTC will be the necessary discharge plan from TCU. Pt and family were agreeable to SNF as discharge plan post TCU stay, and to begin reviewing SNF lists within [facility] " "[facility].      Discussion/Outcomes/Follow-Up:   ZEB emailed Ivan ([email or web address]) a 'Care Compare' list and [facility]-contracted SNF list, both within a 50-mile radius of [place], [place], per Ivan's request.    SW returned to pt's room with paper copies of the above lists. Ivan had returned from his car with the \"two papers\" (which ZEB presumes is the AVS and Authorized Representative form) for pt to sign, stated that \"Seniathuy knows more about this\" and instructed him to have pt sign said documents. ZEB provided education on needing to select facilities from the [facility]-contracted SNF list, and using the 'Care Compare' list to review the star ratings associated with each facility. Ivan confirmed that email from ZEB was received. Ivan asked when the next GOC discussion will be held. ZEB explained that plan is for pt to discharge to TCU, no further care conference is needed with IDT unless there is a significant change in pt's status.   ZEB reiterated that [facility] is still reviewing pt for re-admission; if  TCU cannot accept pt back, then referrals to community TCUs will be placed. ZEB requested that pt/family review the lists provided and select 10 SNF preferences initially, as there are limited LTC beds. ZEB provided education on non-compliance and refusal of cares as being reasons why a SNF would decline pt for admission. Pt and family expressed understanding of information provided and were agreeable discussed discharge plan.    Ivan and/or Pollo (Ivan's son and pt's Financial POA) to submit required documents to [facility] [place] for pt's MA for LTC application.          Elvia Zaragoza, VANESA, LSW  [facility]   PHONE: [phone]  Pager: [phone]     "

## 2023-08-11 NOTE — PLAN OF CARE
"  VS: BP (!) 145/67 (BP Location: Right arm, Patient Position: Semi-Fuentes's)   Pulse 76   Temp 97.5  F (36.4  C) (Oral)   Resp 16   Ht 1.778 m (5' 10\")   Wt 134.4 kg (296 lb 4.8 oz)   SpO2 100%   BMI 42.51 kg/m       O2: Stable on room air>90%. Denies SOB or chest pain    Output: Using urinal at bedside   Last BM: 8/11/23 via bedpan   bowel sounds active   Activity: Not OOB, Assist of 2 with cares and transfers with lift device   Skin: Left hip incision-healing  Left heel pressure ulcer  Blanchable redness to IT and groin area and bruise to left upper arm  Scabs to BLE   Pain: Managed with scheduled Tylenol  and PRN tramadol   CMS: AxO X 3, Disoriented to time. Baseline numbness and tingling    Dressing: L hip- open to air  Left heel mepilex- new dressing applied   Diet: Renal diet      LDA: Right chest CVC single lumen -sl     Equipment: Pt belongings, IV pole and pump   Plan: Count with POC  RN Managed MG, K+  Care conference done 8/11 at 1pm    Additional Info:                             "

## 2023-08-11 NOTE — PROGRESS NOTES
"[facility]  Palliative Care Daily Progress Note       Recommendations & Counseling     TODAY'S DEVELOPMENTS:   We participated in care conference today with primary team, TCU assessment team, social work and nursing staff.  We appreciate TCU assessment team involvement and their practical input into possible readmission to TCU as well as SW family support and clear presentation regarding the practicalities of insurance issues.  Today we addressed a lot of \"big picture\" issues.  Patient/family hope for restorative efforts, and feel he has done a bit better over the past week.  Please see notes from the last several days regarding some improvement in overall engagement.    Rehab teams noted that he has been participating only in OT and not in PT.  Discussed that if he returns to TCU, he would have to be able and willing to participate in therapy requirements there.  Don says he is willing to do this, but given recent and previous performances in this regard, we would just have to see how he does.  Discussed that TCU is not going to be a long-term solution for him.  Discussed that if he is unable to fully participate in therapy, or is unable to progress in therapy, that his stay there in TCU may be limited.  If that is the case, Christiano would likely need either long-term care placement or placement with family assisting in his cares.  He is optimistic that he can make enough progress to not require that level of care, but that will remain to be seen.   discussed that he is working on MA pending, but this can take up to several months.  If he goes to TCU initially and is not able to participate or make progress, there may be a period where family has some financial responsibility until LTC placement could be arranged.  Don tells us that his goals do in fact remain restorative.  Please see palliative care note by our NP Karen Melvin 8/8/23 regarding patient/family concerns about hospice or comfort " focused care.  As per previous notes, Christiano does not demonstrate capacity to independently process complex medical information or make complex medical decisions, although he is able to contribute his thoughts towards his hopes for care going forward.    Caden Rooney MD  Palliative Medicine Consult Team  [facility] Care Consult Pager [phone]    TT: 63 minutes, with > 50% spent in C/C/E patient/family/care teams re: GOC, POC, Sx management.   Seen today for chronic prosthetic joint infection, weakness,     GOALS OF CARE:   Restorative without limits     ADVANCE CARE PLANNING:  No ACP documents on file  Code status: Full Code    PSYCHOSOCIAL/SPIRITUAL:  Family - supported by longterm girlfriend Ivan Rangel (brother) and Pollo (nephew).  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 His post operative course has been complicated by deconditioning. He was discharged to TCU on 12/23/22 and was subsequently readmitted on 5/26/23 for left total hip arthroplasty and reimplantation and discharged 6/2. He most recently presented again on 6/17 with MRSA bacteremia. His present hospital course has been complicated by HALEY and declining function likely needing dialysis, encephalopathy.     Today, the patient was seen for:  [place]ort building  Emotional support  Goals of care            Interval History:   Don and some family members feel he has had some interim improvement in overall cognition and engagement.         Medications:     Notable for:  Tylenol 975mg q8h  Oxycodone 2.5-5mg q6h prn           Physical Exam:   Vital Signs: Blood pressure  "(!) 145/67, pulse 76, temperature 97.5  F (36.4  C), temperature source Oral, resp. rate 16, height 1.778 m (5' 10\"), weight 134.4 kg (296 lb 4.8 oz), SpO2 100 %.   GENERAL: Sitting up in chair NAD, alert and engaged  HEENT: Normocephalic, anicteric sclera, moist mucous membranes  LUNGS: non-labored  NEUROLOGIC: alert and engaged,  PSYCH: full affect             Data Reviewed:   ROUTINE ICU LABS (Last four results)  CMP  Recent Labs   Lab 08/11/23  0827 08/11/23  0607 08/10/23  0651 08/09/23  1543 08/09/23  0803 08/08/23  0749 08/08/23  0738 08/07/23  2158 08/07/23  2106   NA  --  142 141  --  142  --  141  --   --    POTASSIUM  --  3.6 3.7  --  3.6  --  3.7  --   --    CHLORIDE  --  107 106  --  106  --  106  --   --    CO2  --  25 25  --  25  --  25  --   --    ANIONGAP  --  10 10  --  11  --  10  --   --    GLC 97 85 117* 184* 129*   < > 116*   < >  --    BUN  --  42.1* 42.1*  --  38.9*  --  40.2*  --   --    CR  --  2.72* 2.82*  --  2.82*  --  2.95*  --   --    GFRESTIMATED  --  24* 23*  --  23*  --  22*  --   --    MAIKOL  --  8.4* 8.6*  --  8.3*  --  8.5*  --   --    MAG  --  1.4*  --   --  1.7  --  2.2  --  2.1   PHOS  --  3.8 3.9  --  3.8  --  3.7  --   --    PROTTOTAL  --  6.2*  --   --  6.3*  --   --   --   --    ALBUMIN  --  3.2* 3.2*  --  3.2*  --  3.2*  --   --    BILITOTAL  --  0.2  --   --  0.2  --   --   --   --    ALKPHOS  --  79  --   --  80  --   --   --   --    AST  --  15  --   --  12  --   --   --   --    ALT  --  9  --   --  8  --   --   --   --     < > = values in this interval not displayed.     CBC  Recent Labs   Lab 08/11/23  0607 08/09/23  0803 08/07/23  0915 08/05/23  0744   WBC 7.5 7.2 7.8 7.9   RBC 2.36* 2.66* 2.72* 2.85*   HGB 7.1* 7.9* 8.1* 8.5*   HCT 22.4* 25.2* 25.5* 26.8*   MCV 95 95 94 94   MCH 30.1 29.7 29.8 29.8   MCHC 31.7 31.3* 31.8 31.7   RDW 17.8* 17.8* 17.6* 17.5*    271 271 257     INRNo lab results found in last 7 days.  Arterial Blood GasNo lab results found in last " 7 days.

## 2023-08-12 NOTE — PLAN OF CARE
"Patient is alert and oriented x4, reporting pain at 8/10. Pain managed with PRN Tramadol and scheduled pain medications. Pain management effective per patient. Left heel wound dressing CDI. Right CVC heparin locked. Patient uses urinal at bedside. Input and output charted. Up in chair this shift. Patient is able to make needs known and uses the call light appropriately. Continue with the plan of care.    BP (!) 154/80 (BP Location: Left arm, Patient Position: Semi-Fuentes's)   Pulse 78   Temp 98.5  F (36.9  C) (Oral)   Resp 16   Ht 1.778 m (5' 10\")   Wt 134.4 kg (296 lb 4.8 oz)   SpO2 97%   BMI 42.51 kg/m      "

## 2023-08-12 NOTE — PLAN OF CARE
Pt A&O x's 3 but disoriented to time. VSS. Afebrile. 02 sats in  the 90s on RA. Lungs clear. Denies SOB, chest pain or nausea. Tolerating regular diet. Bowel sound active in all quadrants. Pt had a large BM during he shift. Voiding adequate amount into the bedside urinal, Pain well managed with current pain regimen. Pt declined lidocaine patch. CMS intact per baseline. Denies any new onset of numbness or tingling. Right chest CVC patent and SL. Buttock and groin area noted to be excoriated, skin cleansed and barrier cream applied. Pt slept between care and is able to make needs known, call light with in reach. Will continue to monitor.   Skin excoriated

## 2023-08-12 NOTE — PROGRESS NOTES
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  Postop course complicated by profound deconditioning.  He was admitted to TCU initially on 12/23/2022 for physical rehabilitation, however there was no progress with therapies after several months of in bed therapy and his refusals to mobilize. Underwent biopsy on 4/7/2023 without evidence of infection. He was transferred back to [facility] for left total hip arthroplasty and reimplantation on 5/26/23 with Dr. Meyers and was back in TCU Presented again to [facility] for persistent MRSA bacteremia on 6/17 and has remained inpatient since that time. Ultimately has not been out of hospital or rehab since 11/22/22. Course complicated by encephalopathy and difficulty participating with PT and OT. Dr. Gipson had long discussion with patient and his brother Ivan on 8/5 with goal for regaining strength.     A.  Medical problems being managed today  Left heel pain likely secondary to hospital-acquired unstageable pressure injury with possible acute osteomyelitis, not present on admission  X-ray with possible osteomyelitis that is acute versus artifact.  Since the patient is already on antibiotics with ciprofloxacin and doxycycline I will hold off on confirmatory MRI and obtain inflammatory markers in 72 hours.  If this symptomatology improves and inflammatory markers trended down in 72 hours then I would continue to obtain inflammatory markers with CRP once weekly to ensure that the antibiotics are covering all possibilities of bone  infections including the osteomyelitis possible    Newly reported left lower extremity paresis, started 7/9/2023, not present on admission  CT head without acute stroke    Neck pain and discomfort on turning the head likely secondary to muscular pain, not present on admission  X-ray without fracture and continue methocarbamol at night    Acute anemia likely of critical illness on top of anemia of chronic disease, not clear if present on admission  We will monitor closely and obtain reticulocyte count along with a CBC in a.m.  No indication for transfusion at this point.  We will plan to transfuse once hemoglobin is below 7.    Care Conference  I had a goals of care discussion and the care conference with the patient and his family,  of palliative care, case management/social work, nursing staff, and [facility] [facility] staff.  We discussed the picture plans for the patient that he is nearing discharge.  We also discussed about the possibilities after the patient is discharged to TCU that once he completes his skilled nursing needs then more likely than not he will need long-term placement.  We advised the patient to consider the quality of life along with the quantity of life and to avoid the repetitive nature of acute illnesses that leads to frequent hospitalizations followed by TCU admissions followed by hospitalizations and so on.  The patient goals of care continues to be restorative although the patient and the family were appreciative of the discussion.    B. Medical problems managed during this hospitalization  Multifactorial encephalopathy - likely toxic metabolic, uremic, delirium, depression contributing - slowly improving   Noted to have acutely worsening mental status around ~7/4, which was initially felt to be related to cefepime neurotoxicity. Neurology consulted. EEG without seizures. CT head, ammonia and VBG wnl. Completed 5 days of antibiotics. No clinical or objective findings concerning for  worsening infection. Did have worsening renal function and uremia, and so without clear other etiology discussion was that needed to rule out uremia definitively and agreed to pursue HD for toxin clearance and re-assessment of mentation. However, renal functin has improved with uremia down-trending with no improvement in mental status. This is superimposed on several months of refusal to participate in cares. Concern for underlying cognitive decline and uncontrolled depression.  - Delirium precautions  - Nephrology following, not planning to initiate HD. Requested tunneled catheter removal.   - Psychiatry consulted on 7/18  for capacity assessment - pt not decisional regarding complex medical decisions or disposition, if he continues to improve from a mental status standpoint, consider psych re-assessment   - Neurology and Psychiatry consulted 8/4                 = Thiamine 100mg qday                 = Scheduled Melatonin 3mg at bedtime      MRSA bacteremia due to possible left hip septic arthritis vs other source - resolved   Blood cultures: 6/14, 6/15 + for MRSA (4/4), and 6/16 (1/2 GPCs) and 6/17 (2/2) GPCs. - started vancomycin 6/15.   6/17 sinovial fluid cultures with staph and klebsiella. Purulent drainage noted at incision site at TCU, CT left hip concerning for infection (no mention of joint, mostly anterior in soft tissue) and couldn't exclude abscess/inf  - ID intermittently following through hospital course. Last seen 7/21.  - Continue ceftazidime, vancomycin for C.acnes, Pseudomonas, MRSA PJI until 8/1/2023 (completed)   - Start oral doxycycline and ciprofloxacin on 8/2/2023 through 9/13 (total 12 weeks therapy), intermittently refusing, getting about 50%   - ID follow up ~8/21 (can be inpatient team if remains inpatient or can be outpatient)      Acute Kidney Injury / Acute renal failure - multifactorial, see above/below - improving   Nephrology consulted, appreciate recs. Baseline creatinine 1.0 or  1.1. Etiology: suspect combo of sepsis/inflammation, hypovolemia, blood loss anemia following surgery, vancomycin. UA recollected on 7/19 with granular casts suggesting ongoing dense ATN. Creatinine continuing to uptrend, however no acidosis, electrolyte abnormalities or evidence of hypervolemia. Some concern that uremia could be contributing to his encephalopathy. After GOC on 7/21, planned to pursue short trial of dialysis.  - Nephrology consulted  - Initially planned for short trial of HD, has been deferred d/t renal recovery, HD line placed 8/1. On 8/7 nephrology team recommended removal of line.  Tunneled line removed by IR on 8/8  -Please laboratory workup to be obtained on 8/9 AM     Asymptomatic bacteruria (VRE)  UA obtained on 7/19 to assess for casts, reflexed to culture which is growing VRE. Labs and vitals with low concern for new infection. Discussed with ID who recommended against treatment.     Bilateral LE edema due to volume overload from HALEY and iatrogenic from blood transfusions  Hyperesthesia of bilateral lower extremities  Despite sensitivity to light touch, pulses intact, both warm, no evidence of acute issue and patient states pain has been stable for the past month.   - Holding diuresis since 7/20 while renal recovery ongoing      L heel pressure wound, unstagable  Noted earlier in pts hospitalization (~6/20), but at that time he refused assessment. Noted again by nursing to be worsening on 7/19 and WOCN consult was placed. Pt was agreeable and had wound assessed. He has been mostly non-agreeable to offloading or other preventative measures recommended by RN and WOCN, but will sometimes allow it with significant encouragement.  - WOCN consult  - Continue to encourage pt to utilize offloading techniques and to participate in wound cares.     Hypernatremia - resolved & recurrent   Uptrending recently, 147 on 7/18. Likely volume down. S/p D5W on 7/18, resolved. Elevated again and responded to  D5W on 7/29, and again on 8/1  - Monitor.  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 For DVT/PE treatment it appears that dose should be increased if tolerating.      S/p left hip explantation of left hip antibiotic spacer and revision of left total hip arthroplasty (5/26/2023)  - ortho performed washout 6/21  -Activity: 50% PWB LLE with posterior hip precautions [id number]  -AFO ordered for foot drop but patient refusing  -PT/OT  -Knee to be kept even in a foam leg elevator to relax sciatic nerve  -Pain: holding robaxin 4 times a day with mental status changes (discontinuing), APAP PRN  -stop tramadol (7/11) due to renal function  -oxycodone 2.5-5 mg q6h PRN- taking very minimal amts so unlikely to be contributing to mental status     Type 2 diabetes mellitus  - Last hemoglobin A1c 5.6% on 6/16/2023.  On glipizide, metformin, Actos, and Victoza prior to hospitalizations and surgery.   - continue ISS     Constipation, improved  -Continue Colace 100 mg twice daily  -Continue senna 1 tablet twice daily-- had briefly increased to 2 tab twice daily due to lack of stool, now improved  -Continue MiraLAX daily     Hypomagnesemia  -monitor and replete as indicated     Severe malnutrition in the context of acute on chronic illness  Goal for patient is to consume % of meals TID. Goal is 7501-3972 kcals/daily. Protein needs- 100-126 grams/daily.   -RD consult and appreciate recs  -regular diet  -Weekly  "weights, daily I/O     Adjustment disorder with depressed mood  Prolonged grief disorder  Patient has been with depressed mood in setting of ongoing health issues, as evidenced by lack of motivation to work with therapies during both TCU admissions.  Past history of passive suicidal statements. Psychiatry saw patient during hospital admission on 5/31 due to lack of motivation with therapies. Patient declined interest in starting any psychiatric medications. Was willing to engage with health psychology.   -Continue to monitor for willingness to start antidepressant therapy, psych re-consulted on 8/7     HTN:   - Started amlodipine 7/16, increased to 10mg 7/30. Still intermittently hypertensive, monitoring.      Stable and Resolved Issues:  Hyperlipidemia-continue PTA Lipitor 40 mg daily  JAIR-CPAP when sleeping       Diet: Renal Diet (non-dialysis)    DVT Prophylaxis: DOAC  Tran Catheter: Not present  Lines: PRESENT      CVC Single Lumen Right Internal jugular Non - valved (open ended);Tunneled;Power injectable-Site Assessment: WDL        Cardiac Monitoring: None  Code Status: Full Code      Clinically Significant Risk Factors          # Hypocalcemia: Lowest Ca = 8.4 mg/dL in last 2 days, will monitor and replace as appropriate   # Hypomagnesemia: Lowest Mg = 1.4 mg/dL in last 2 days, will replace as needed   # Hypoalbuminemia: Lowest albumin = 2.2 g/dL at 7/8/2023  8:51 AM, will monitor as appropriate            # Severe Obesity: Estimated body mass index is 42.51 kg/m  as calculated from the following:    Height as of this encounter: 1.778 m (5' 10\").    Weight as of this encounter: 134.4 kg (296 lb 4.8 oz).     # Severe Malnutrition: based on nutrition assessment           Billing  I spent 45 minutes at the bedside discussing goals of care discussion dated 8/11    Disposition Plan     Expected Discharge Date: 08/15/2023, 12:00 PM      Discharge Comments: Care conference 8/11 at 1 pm likely will need long term " placement Pending care conference as patient states desire for rehab but as of yet has not sufficiently participated in therapy to qualify.        Porfirio Stokes MD  Hospitalist Service, GOLD TEAM 21  [facility]  Securely message with "[employer] (more info)  Text page via [facility] Paging/Directory   See signed in provider for up to date coverage information  ______________________________________________________________________    Interval History   The patient very mild improvement in lower extremity pain.  No subjective fever or chills    Physical Exam   Vital Signs: Temp: 97.6  F (36.4  C) Temp src: Oral BP: (!) 140/69 Pulse: 74   Resp: 16 SpO2: 93 % O2 Device: None (Room air)    Weight: 296 lbs 4.77 oz  Physical Exam  Vitals reviewed.   Constitutional:       Comments: Reclined in bed with head of bed elevated eating eggs and oatmeal.    HENT:      Head: Atraumatic.      Nose: Nose normal.   Eyes:      Conjunctiva/sclera: Conjunctivae normal.   Cardiovascular:      Rate and Rhythm: Normal rate.      Pulses: Normal pulses.   Pulmonary:      Effort: Pulmonary effort is normal. No respiratory distress.      Breath sounds: No wheezing or rales.   Abdominal:      General: There is no distension.      Palpations: Abdomen is soft.      Tenderness: There is no abdominal tenderness.   Musculoskeletal:      Right lower leg: No edema.      Left lower leg: No edema.      Comments: Feet in soft boots bilaterally. Significant pain with light touch of skin of left foot, though color normal, no swelling, pulses intact. He states this has been present a month.    Skin:     General: Skin is warm.      Capillary Refill: Capillary refill takes less than 2 seconds.   Neurological:      Mental Status: He is alert.      Comments: Debility is diffuse. Able to wiggle toes bilaterally, use arms to eat from tray though with some tremor.    Psychiatric:      Comments: Affect somewhat irritable. He  expressed confidence that he could walk with people helping him although he has not been out of bed for a very long time.       Medical Decision Making       55 MINUTES SPENT BY ME on the date of service doing chart review, history, exam, documentation & further activities per the note.      Data     I have personally reviewed the following data over the past 24 hrs:    7.5  \   7.1 (L)   / 273     142 107 42.1 (H) /  97   3.6 25 2.72 (H) \     ALT: 9 AST: 15 AP: 79 TBILI: 0.2   ALB: 3.2 (L) TOT PROTEIN: 6.2 (L) LIPASE: N/A       Imaging results reviewed over the past 24 hrs:   No results found for this or any previous visit (from the past 24 hour(s)).

## 2023-08-12 NOTE — PLAN OF CARE
"        VS:     BP (!) 140/69   Pulse 74   Temp 97.6  F (36.4  C) (Oral)   Resp 16   Ht 1.778 m (5' 10\")   Wt 134.4 kg (296 lb 4.8 oz)   SpO2 93%   BMI 42.51 kg/m        Output:     Bowels- active in all four quadrants. Last BM 8/11. Voids spontaneously without difficulty in the bedside urinal.      Activity:     Pt up with ceiling lift.   Skin: Surgical incision to L hip, PI to L heel, abrasions to buttocks and groin, scattered bruises and scabs.     Pain:     Has pain in the L heel and given scheduled tylenol and is tolerating adequately.      Neuro/CMS:     Alert and oriented to self, place, and situation, disoriented to time. Baseline neuropathy in BLE.      Dressing:     Mepilex dressing to L heel per plan of care. Preventative mepilex to R heel.     Diet:     Pt is on a renal diet and appetite was poor this shift.       LDA:     CVC to R chest wall.      Equipment:     Pulsate, IV pole, and pt belongings.     Plan:     TBD, IM and SW following. Pt is able to make needs known and the call light is within the pt's reach. Continue to monitor.       Additional Info:                         "

## 2023-08-12 NOTE — PROGRESS NOTES
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  Postop course complicated by profound deconditioning.  He was admitted to TCU initially on 12/23/2022 for physical rehabilitation, however there was no progress with therapies after several months of in bed therapy and his refusals to mobilize. Underwent biopsy on 4/7/2023 without evidence of infection. He was transferred back to [facility] for left total hip arthroplasty and reimplantation on 5/26/23 with Dr. Meyers and was back in TCU Presented again to [facility] for persistent MRSA bacteremia on 6/17 and has remained inpatient since that time. Ultimately has not been out of hospital or rehab since 11/22/22. Course complicated by encephalopathy and difficulty participating with PT and OT. Dr. Gipson had long discussion with patient and his brother Ivan on 8/5 with goal for regaining strength.     A.  Medical problems being managed today  Left heel pain likely secondary to hospital-acquired unstageable pressure injury with possible acute osteomyelitis, not present on admission  The patient reported worsening foot pain.  I ordered follow-up laboratory work-up including white blood cell count, platelets count and CRP.  This the patient is being treated for septic arthritis with ciprofloxacin and doxycycline that are both culture guided, I would expect his osteomyelitis to improve with antibiotic therapy.  The only caveat would be if there is a bone abscess that would change the management with switch to intravenous antibiotics  and consultation with orthopedics.  If this is the case then I would expect inflammatory markers to get worse.  Since I already obtained x-ray, I will consider advanced imaging if inflammatory markers are worsening which would be hand-in-hand with his symptomatology.  I changed pain regimen with an increase in tramadol for moderate pain and addition of intravenous Dilaudid at 0.3 mg for severe pain.    Neck pain likely secondary to cervical spondylosis, ruled out for fracture  No prior cervical spine MRI.  I was able to rule out fracture with an x-ray.  Advised spine imaging can be considered as outpatient especially if symptomatology improved with chronic pain management and physical therapy.    Acute anemia likely of critical illness on top of anemia of chronic disease, not clear if present on admission  Awaiting CBC for tomorrow    B. Medical problems managed during this hospitalization  Multifactorial encephalopathy - likely toxic metabolic, uremic, delirium, depression contributing - slowly improving   Noted to have acutely worsening mental status around ~7/4, which was initially felt to be related to cefepime neurotoxicity. Neurology consulted. EEG without seizures. CT head, ammonia and VBG wnl. Completed 5 days of antibiotics. No clinical or objective findings concerning for worsening infection. Did have worsening renal function and uremia, and so without clear other etiology discussion was that needed to rule out uremia definitively and agreed to pursue HD for toxin clearance and re-assessment of mentation. However, renal functin has improved with uremia down-trending with no improvement in mental status. This is superimposed on several months of refusal to participate in cares. Concern for underlying cognitive decline and uncontrolled depression.  - Delirium precautions  - Nephrology following, not planning to initiate HD. Requested tunneled catheter removal.   - Psychiatry consulted on 7/18  for capacity  assessment - pt not decisional regarding complex medical decisions or disposition, if he continues to improve from a mental status standpoint, consider psych re-assessment   - Neurology and Psychiatry consulted 8/4                 = Thiamine 100mg qday                 = Scheduled Melatonin 3mg at bedtime      MRSA bacteremia due to possible left hip septic arthritis vs other source - resolved   Blood cultures: 6/14, 6/15 + for MRSA (4/4), and 6/16 (1/2 GPCs) and 6/17 (2/2) GPCs. - started vancomycin 6/15.   6/17 sinovial fluid cultures with staph and klebsiella. Purulent drainage noted at incision site at TCU, CT left hip concerning for infection (no mention of joint, mostly anterior in soft tissue) and couldn't exclude abscess/inf  - ID intermittently following through hospital course. Last seen 7/21.  - Continue ceftazidime, vancomycin for C.acnes, Pseudomonas, MRSA PJI until 8/1/2023 (completed)   - Start oral doxycycline and ciprofloxacin on 8/2/2023 through 9/13 (total 12 weeks therapy), intermittently refusing, getting about 50%   - ID follow up ~8/21 (can be inpatient team if remains inpatient or can be outpatient)      Acute Kidney Injury / Acute renal failure - multifactorial, see above/below - improving   Nephrology consulted, appreciate recs. Baseline creatinine 1.0 or 1.1. Etiology: suspect combo of sepsis/inflammation, hypovolemia, blood loss anemia following surgery, vancomycin. UA recollected on 7/19 with granular casts suggesting ongoing dense ATN. Creatinine continuing to uptrend, however no acidosis, electrolyte abnormalities or evidence of hypervolemia. Some concern that uremia could be contributing to his encephalopathy. After GOC on 7/21, planned to pursue short trial of dialysis.  - Nephrology consulted  - Initially planned for short trial of HD, has been deferred d/t renal recovery, HD line placed 8/1. On 8/7 nephrology team recommended removal of line.  Tunneled line removed by IR on  8/8  -Please laboratory workup to be obtained on 8/9 AM     Asymptomatic bacteruria (VRE)  UA obtained on 7/19 to assess for casts, reflexed to culture which is growing VRE. Labs and vitals with low concern for new infection. Discussed with ID who recommended against treatment.     Bilateral LE edema due to volume overload from HALEY and iatrogenic from blood transfusions  Hyperesthesia of bilateral lower extremities  Despite sensitivity to light touch, pulses intact, both warm, no evidence of acute issue and patient states pain has been stable for the past month.   - Holding diuresis since 7/20 while renal recovery ongoing      L heel pressure wound, unstagable  Noted earlier in pts hospitalization (~6/20), but at that time he refused assessment. Noted again by nursing to be worsening on 7/19 and WOCN consult was placed. Pt was agreeable and had wound assessed. He has been mostly non-agreeable to offloading or other preventative measures recommended by RN and WOCN, but will sometimes allow it with significant encouragement.  - WOCN consult  - Continue to encourage pt to utilize offloading techniques and to participate in wound cares.     Hypernatremia - resolved & recurrent   Uptrending recently, 147 on 7/18. Likely volume down. S/p D5W on 7/18, resolved. Elevated again and responded to D5W on 7/29, and again on 8/1  - Monitor.  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 For DVT/PE treatment it appears  that dose should be increased if tolerating.      S/p left hip explantation of left hip antibiotic spacer and revision of left total hip arthroplasty (5/26/2023)  - ortho performed washout 6/21  -Activity: 50% PWB LLE with posterior hip precautions [id number]  -AFO ordered for foot drop but patient refusing  -PT/OT  -Knee to be kept even in a foam leg elevator to relax sciatic nerve  -Pain: holding robaxin 4 times a day with mental status changes (discontinuing), APAP PRN  -stop tramadol (7/11) due to renal function  -oxycodone 2.5-5 mg q6h PRN- taking very minimal amts so unlikely to be contributing to mental status     Type 2 diabetes mellitus  - Last hemoglobin A1c 5.6% on 6/16/2023.  On glipizide, metformin, Actos, and Victoza prior to hospitalizations and surgery.   - continue ISS     Constipation, improved  -Continue Colace 100 mg twice daily  -Continue senna 1 tablet twice daily-- had briefly increased to 2 tab twice daily due to lack of stool, now improved  -Continue MiraLAX daily     Hypomagnesemia  -monitor and replete as indicated     Severe malnutrition in the context of acute on chronic illness  Goal for patient is to consume % of meals TID. Goal is 8035-8668 kcals/daily. Protein needs- 100-126 grams/daily.   -RD consult and appreciate recs  -regular diet  -Weekly weights, daily I/O     Adjustment disorder with depressed mood  Prolonged grief disorder  Patient has been with depressed mood in setting of ongoing health issues, as evidenced by lack of motivation to work with therapies during both TCU admissions.  Past history of passive suicidal statements. Psychiatry saw patient during hospital admission on 5/31 due to lack of motivation with therapies. Patient declined interest in starting any psychiatric medications. Was willing to engage with health psychology.   -Continue to monitor for willingness to start antidepressant therapy, psych re-consulted on 8/7     HTN:   - Started amlodipine 7/16,  "increased to 10mg 7/30. Still intermittently hypertensive, monitoring.      Stable and Resolved Issues:  Hyperlipidemia-continue PTA Lipitor 40 mg daily  JAIR-CPAP when sleeping       Diet: Renal Diet (non-dialysis)    DVT Prophylaxis: DOAC  Tran Catheter: Not present  Lines: PRESENT      CVC Single Lumen Right Internal jugular Non - valved (open ended);Tunneled;Power injectable-Site Assessment: WDL        Cardiac Monitoring: None  Code Status: Full Code      Clinically Significant Risk Factors          # Hypocalcemia: Lowest Ca = 8.4 mg/dL in last 2 days, will monitor and replace as appropriate   # Hypomagnesemia: Lowest Mg = 1.4 mg/dL in last 2 days, will replace as needed   # Hypoalbuminemia: Lowest albumin = 2.2 g/dL at 7/8/2023  8:51 AM, will monitor as appropriate            # Severe Obesity: Estimated body mass index is 42.51 kg/m  as calculated from the following:    Height as of this encounter: 1.778 m (5' 10\").    Weight as of this encounter: 134.4 kg (296 lb 4.8 oz).     # Severe Malnutrition: based on nutrition assessment             Disposition Plan      Expected Discharge Date: 08/14/2023,  9:00 AM      Discharge Comments: Should be medically ready within the upcoming three days, then will be pending TCU placement Pending care conference as patient states desire for rehab but as of yet has not sufficiently participated in therapy to qualify.        Porfirio Stokes MD  Hospitalist Service, GOLD TEAM 21  [facility]  Securely message with [employer] (more info)  Text page via [facility] Paging/Directory   See signed in provider for up to date coverage information  ______________________________________________________________________    Interval History   The patient reported worsening left foot pain.  No reported subjective fever or chills    Physical Exam   Vital Signs: Temp: 98.5  F (36.9  C) Temp src: Oral BP: (!) 154/80 Pulse: 78   Resp: 16 SpO2: 97 % O2 Device: " None (Room air)    Weight: 296 lbs 4.77 oz  Physical Exam  Vitals reviewed.   Constitutional:       Comments: Reclined in bed with head of bed elevated eating eggs and oatmeal.    HENT:      Head: Atraumatic.      Nose: Nose normal.   Eyes:      Conjunctiva/sclera: Conjunctivae normal.   Cardiovascular:      Rate and Rhythm: Normal rate.      Pulses: Normal pulses.   Pulmonary:      Effort: Pulmonary effort is normal. No respiratory distress.      Breath sounds: No wheezing or rales.   Abdominal:      General: There is no distension.      Palpations: Abdomen is soft.      Tenderness: There is no abdominal tenderness.   Musculoskeletal:      Right lower leg: No edema.      Left lower leg: No edema.      Comments: Feet in soft boots bilaterally. Significant pain with light touch of skin of left foot, though color normal, no swelling, pulses intact. He states this has been present a month.    Skin:     General: Skin is warm.      Capillary Refill: Capillary refill takes less than 2 seconds.   Neurological:      Mental Status: He is alert.      Comments: Debility is diffuse. Able to wiggle toes bilaterally, use arms to eat from tray though with some tremor.    Psychiatric:      Comments: Affect somewhat irritable. He expressed confidence that he could walk with people helping him although he has not been out of bed for a very long time.       Medical Decision Making       55 MINUTES SPENT BY ME on the date of service doing chart review, history, exam, documentation & further activities per the note.      Data         Imaging results reviewed over the past 24 hrs:   No results found for this or any previous visit (from the past 24 hour(s)).

## 2023-08-13 NOTE — PROGRESS NOTES
"Brief Medicine Note:    Patient with mechanical fall out of bed earlier this evening. Had been waiting on staff to assist him with going to restroom. Reports falling \"all crunched up\" but now with pain in his left hip and knee primarily. Had told RN earlier he hurt \"all over\" Denies head strike.    - XR left pelvis, knee and ankle  - Can consider further orthopedics evaluation in AM if needed    LENIN JonesC  Internal Medicine [facility] Hospitalist  [facility]      "

## 2023-08-13 NOTE — PROGRESS NOTES
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  Postop course complicated by profound deconditioning.  He was admitted to TCU initially on 12/23/2022 for physical rehabilitation, however there was no progress with therapies after several months of in bed therapy and his refusals to mobilize. Underwent biopsy on 4/7/2023 without evidence of infection. He was transferred back to [facility] for left total hip arthroplasty and reimplantation on 5/26/23 with Dr. Meyers and was back in TCU Presented again to [facility] for persistent MRSA bacteremia on 6/17 and has remained inpatient since that time. Ultimately has not been out of hospital or rehab since 11/22/22. Course complicated by encephalopathy and difficulty participating with PT and OT. Dr. Gipson had long discussion with patient and his brother Ivan on 8/5 with goal for regaining strength.     A.  Medical problems being managed today  Left heel pain likely secondary to hospital-acquired unstageable pressure injury with possible acute osteomyelitis, not present on admission  The patient reported worsening left foot pain and neck pain.  The patient mental status only very recently [2 days prior] improved as such was not able to fully report symptomatology prior to this.  Given worsened pain overnight [that led to a fall last night 8/12], MRSA bacteremia, septic arthritis, the patient is at high risk for osteomyelitis.  X-ray imaging of the left foot is remarkable for acute osteomyelitis versus artifact.  X-ray of cervical  spine without evidence of fracture however there is evidence of spondylosis.  I ordered advanced imaging of the cervical spine and the foot.  Osteomyelitis per se may not change the management however evidence of cervical disc prolapse or left calcaneal abscess can change the management towards more surgical intervention especially in a patient who would like to pursue all available interventions.  For pain management, I will continue nonnarcotic management with acetaminophen 1 g every 8 hours, diclofenac gel 2 g 4 times daily, methocarbamol 500 mg at bedtime [avoiding daytime methocarbamol given history of refractory encephalopathy during this hospitalization],.  I scheduled tramadol 25 mg every 6 hours with holding parameters for change of mental status or respiratory depression.    Unwitnessed fall with trauma to the left lower extremity overnight 8/12, not present on admission  I requested orthostatic blood pressure changes.  The patient reported pain in the left knee.  On secondary trauma survey, the only area with point tenderness was at the knee.  I appreciate my colleague physician assistant Machuca obtained x-ray of the knee, x-ray of the pelvis, and x-ray of the ankle without any fractures.  I personally discussed the care with orthopedics we will monitor along clinically.  We will hold off on further imaging.    Acute anemia likely of critical illness on top of anemia of chronic disease, not clear if present on admission  Periodic CBC    B. Medical problems managed during this hospitalization  Multifactorial encephalopathy - likely toxic metabolic, uremic, delirium, depression contributing - slowly improving   Noted to have acutely worsening mental status around ~7/4, which was initially felt to be related to cefepime neurotoxicity. Neurology consulted. EEG without seizures. CT head, ammonia and VBG wnl. Completed 5 days of antibiotics. No clinical or objective findings concerning for worsening infection.  Did have worsening renal function and uremia, and so without clear other etiology discussion was that needed to rule out uremia definitively and agreed to pursue HD for toxin clearance and re-assessment of mentation. However, renal functin has improved with uremia down-trending with no improvement in mental status. This is superimposed on several months of refusal to participate in cares. Concern for underlying cognitive decline and uncontrolled depression.  - Delirium precautions  - Nephrology following, not planning to initiate HD. Requested tunneled catheter removal.   - Psychiatry consulted on 7/18  for capacity assessment - pt not decisional regarding complex medical decisions or disposition, if he continues to improve from a mental status standpoint, consider psych re-assessment   - Neurology and Psychiatry consulted 8/4                 = Thiamine 100mg qday                 = Scheduled Melatonin 3mg at bedtime      MRSA bacteremia due to possible left hip septic arthritis vs other source - resolved   Blood cultures: 6/14, 6/15 + for MRSA (4/4), and 6/16 (1/2 GPCs) and 6/17 (2/2) GPCs. - started vancomycin 6/15.   6/17 sinovial fluid cultures with staph and klebsiella. Purulent drainage noted at incision site at TCU, CT left hip concerning for infection (no mention of joint, mostly anterior in soft tissue) and couldn't exclude abscess/inf  - ID intermittently following through hospital course. Last seen 7/21.  - Continue ceftazidime, vancomycin for C.acnes, Pseudomonas, MRSA PJI until 8/1/2023 (completed)   - Start oral doxycycline and ciprofloxacin on 8/2/2023 through 9/13 (total 12 weeks therapy), intermittently refusing, getting about 50%   - ID follow up ~8/21 (can be inpatient team if remains inpatient or can be outpatient)      Acute Kidney Injury / Acute renal failure - multifactorial, see above/below - improving   Nephrology consulted, appreciate recs. Baseline creatinine 1.0 or 1.1. Etiology: suspect  combo of sepsis/inflammation, hypovolemia, blood loss anemia following surgery, vancomycin. UA recollected on 7/19 with granular casts suggesting ongoing dense ATN. Creatinine continuing to uptrend, however no acidosis, electrolyte abnormalities or evidence of hypervolemia. Some concern that uremia could be contributing to his encephalopathy. After GOC on 7/21, planned to pursue short trial of dialysis.  - Nephrology consulted  - Initially planned for short trial of HD, has been deferred d/t renal recovery, HD line placed 8/1. On 8/7 nephrology team recommended removal of line.  Tunneled line removed by IR on 8/8  -Please laboratory workup to be obtained on 8/9 AM     Asymptomatic bacteruria (VRE)  UA obtained on 7/19 to assess for casts, reflexed to culture which is growing VRE. Labs and vitals with low concern for new infection. Discussed with ID who recommended against treatment.     Bilateral LE edema due to volume overload from HALEY and iatrogenic from blood transfusions  Hyperesthesia of bilateral lower extremities  Despite sensitivity to light touch, pulses intact, both warm, no evidence of acute issue and patient states pain has been stable for the past month.   - Holding diuresis since 7/20 while renal recovery ongoing      L heel pressure wound, unstagable  Noted earlier in pts hospitalization (~6/20), but at that time he refused assessment. Noted again by nursing to be worsening on 7/19 and WOCN consult was placed. Pt was agreeable and had wound assessed. He has been mostly non-agreeable to offloading or other preventative measures recommended by RN and WOCN, but will sometimes allow it with significant encouragement.  - WOCN consult  - Continue to encourage pt to utilize offloading techniques and to participate in wound cares.     Hypernatremia - resolved & recurrent   Uptrending recently, 147 on 7/18. Likely volume down. S/p D5W on 7/18, resolved. Elevated again and responded to D5W on 7/29, and again on  8/1  - Monitor.  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 For DVT/PE treatment it appears that dose should be increased if tolerating.      S/p left hip explantation of left hip antibiotic spacer and revision of left total hip arthroplasty (5/26/2023)  - ortho performed washout 6/21  -Activity: 50% PWB LLE with posterior hip precautions [id number]  -AFO ordered for foot drop but patient refusing  -PT/OT  -Knee to be kept even in a foam leg elevator to relax sciatic nerve  -Pain: holding robaxin 4 times a day with mental status changes (discontinuing), APAP PRN  -stop tramadol (7/11) due to renal function  -oxycodone 2.5-5 mg q6h PRN- taking very minimal amts so unlikely to be contributing to mental status     Type 2 diabetes mellitus  - Last hemoglobin A1c 5.6% on 6/16/2023.  On glipizide, metformin, Actos, and Victoza prior to hospitalizations and surgery.   - continue ISS     Constipation, improved  -Continue Colace 100 mg twice daily  -Continue senna 1 tablet twice daily-- had briefly increased to 2 tab twice daily due to lack of stool, now improved  -Continue MiraLAX daily     Hypomagnesemia  -monitor and replete as indicated     Severe malnutrition in the context of acute on chronic illness  Goal for patient is to consume % of meals TID. Goal is 6507-6281 kcals/daily. Protein needs- 100-126 grams/daily.   -RD consult and appreciate recs  -regular diet  -Weekly weights, daily I/O    "  Adjustment disorder with depressed mood  Prolonged grief disorder  Patient has been with depressed mood in setting of ongoing health issues, as evidenced by lack of motivation to work with therapies during both TCU admissions.  Past history of passive suicidal statements. Psychiatry saw patient during hospital admission on 5/31 due to lack of motivation with therapies. Patient declined interest in starting any psychiatric medications. Was willing to engage with health psychology.   -Continue to monitor for willingness to start antidepressant therapy, psych re-consulted on 8/7     HTN:   - Started amlodipine 7/16, increased to 10mg 7/30. Still intermittently hypertensive, monitoring.      Stable and Resolved Issues:  Hyperlipidemia-continue PTA Lipitor 40 mg daily  JAIR-CPAP when sleeping       Diet: Renal Diet (non-dialysis)    DVT Prophylaxis: DOAC  Tran Catheter: Not present  Lines: PRESENT               Cardiac Monitoring: None  Code Status: Full Code      Clinically Significant Risk Factors            # Hypomagnesemia: Lowest Mg = 1.2 mg/dL in last 2 days, will replace as needed   # Hypoalbuminemia: Lowest albumin = 2.2 g/dL at 7/8/2023  8:51 AM, will monitor as appropriate            # Severe Obesity: Estimated body mass index is 42.51 kg/m  as calculated from the following:    Height as of this encounter: 1.778 m (5' 10\").    Weight as of this encounter: 134.4 kg (296 lb 4.8 oz).     # Severe Malnutrition: based on nutrition assessment             Disposition Plan      Expected Discharge Date: 08/16/2023, 12:00 PM      Discharge Comments: Should be medically ready within the upcoming two days, then will be pending TCU placement Pending care conference as patient states desire for rehab but as of yet has not sufficiently participated in therapy to qualify.        Porfirio Stokes MD  Hospitalist Service, GOLD TEAM 21  [facility]  Securely message with [facility] " (more info)  Text page via [facility] Paging/Directory   See signed in provider for up to date coverage information  ______________________________________________________________________    Interval History   The patient reported fall overnight and hitting his left lower extremity when he fell.  The patient continues to report significant worsening of neck pain and left lower extremity pain.  The patient reported lack of effectiveness of narcotics for control of the pain.  The patient reported in the prolonged nature of the pain that eventually led to the fall.    Nursing staff did not witness the fall.    Physical Exam   Vital Signs: Temp: 98.2  F (36.8  C) Temp src: Axillary BP: 124/58 Pulse: 77   Resp: 16 SpO2: 97 % O2 Device: None (Room air)    Weight: 296 lbs 4.77 oz  Physical Exam  Vitals reviewed.   Constitutional:       Comments: Reclined in bed with head of bed elevated eating eggs and oatmeal.    HENT:      Head: Atraumatic.      Nose: Nose normal.   Eyes:      Conjunctiva/sclera: Conjunctivae normal.   Cardiovascular:      Rate and Rhythm: Normal rate.      Pulses: Normal pulses.   Pulmonary:      Effort: Pulmonary effort is normal. No respiratory distress.      Breath sounds: No wheezing or rales.   Abdominal:      General: There is no distension.      Palpations: Abdomen is soft.      Tenderness: There is no abdominal tenderness.   Musculoskeletal:      Right lower leg: No edema.      Left lower leg: No edema.      Comments: Feet in soft boots bilaterally. Significant pain with light touch of skin of left foot, though color normal, no swelling, pulses intact. He states this has been present a month.    Skin:     General: Skin is warm.      Capillary Refill: Capillary refill takes less than 2 seconds.   Neurological:      Mental Status: He is alert.      Comments: Debility is diffuse. Able to wiggle toes bilaterally, use arms to eat from tray though with some tremor.    Psychiatric:      Comments: Affect  somewhat irritable. He expressed confidence that he could walk with people helping him although he has not been out of bed for a very long time.       Medical Decision Making       55 MINUTES SPENT BY ME on the date of service doing chart review, history, exam, documentation & further activities per the note.      Data     I have personally reviewed the following data over the past 24 hrs:    5.7  \   7.9 (L)   / 222     140 109 (H) 34.5 (H) /  90   3.9 22 2.08 (H) \     ALT: 10 AST: 18 AP: 63 TBILI: 0.2   ALB: 2.8 (L) TOT PROTEIN: 5.5 (L) LIPASE: N/A       Imaging results reviewed over the past 24 hrs:   Recent Results (from the past 24 hour(s))   XR Ankle Left G/E 3 Views    Narrative    EXAM: XR ANKLE LEFT G/E 3 VIEWS  LOCATION: [facility]  DATE: 8/12/2023    INDICATION: Pain  COMPARISON: None.      Impression    IMPRESSION: The left ankle is negative for fracture. Degenerative change at the tibiotalar joint. Achilles calcaneal spurring. Degenerative change at the subtalar joints.   XR Knee Left 1/2 Views    Narrative    EXAM: XR KNEE LEFT 1/2 VIEWS  LOCATION: [facility]  DATE: 8/12/2023    INDICATION: fall out of bed in setting of prior total left hip, increased pain in left hip and knee  COMPARISON: None.      Impression    IMPRESSION: Degenerative change at the medial and patellofemoral compartment. No evidence for fracture. No effusion.   XR Pelvis 1/2 Views    Narrative    EXAM: XR PELVIS 1/2 VIEWS  LOCATION: [facility]  DATE: 8/12/2023    INDICATION: fall out of bed in setting of prior total left hip, increased pain in left hip and knee  COMPARISON: None.      Impression    IMPRESSION: Postoperative changes left total hip arthroplasty and cerclage wire fixation. No evidence for fracture or dislocation. Degenerative change right hip joint. Pelvis negative for fracture.  Vascular calcifications.

## 2023-08-13 NOTE — PLAN OF CARE
"Patient is alert and oriented x4, reporting pain at 7/10. Pain managed with scheduled Tramadol and tylenol. Right chest CVC heparin locked. Continent of bowel and bladder, last BM 8/13/23. Patient taken down for CT. Patient is able to make needs known and uses the call light appropriately. Continue with the plan of care.    /45 (BP Location: Left arm)   Pulse 77   Temp 97.5  F (36.4  C) (Oral)   Resp 16   Ht 1.778 m (5' 10\")   Wt 134.4 kg (296 lb 4.8 oz)   SpO2 94%   BMI 42.51 kg/m      "

## 2023-08-13 NOTE — PLAN OF CARE
Pt A&O x's 4. VSS. Afebrile. 02 sats in the 90s on RA.Lungs clear. Denies SOB, CP or nausea. Tolerating regular diet. Bowel sound active in all quadrants. Pt was incontinent of bladder. passing gas. Voiding adequate amount into the urinal. Pain fairly managed with current pain regimen.CMS intact per baseline. denies  any new onset of N/T. Left heel dressing clean, dry and intact. Right chest CVC patent and SL.Pt slept between care and is able to make needs known, call light with in reach. Will continue to monitor.   This morning pt declined to e repositioned but used urinal. Denied having BM when writer offered to [facility]

## 2023-08-13 NOTE — PROGRESS NOTES
"As soon as this writer got done receiving report on this pt, the off going RN went into the pt to respond to the pt's call light. This writer also went into the pt room and found that the pt was  on the floor, left knee bent, right leg extending out ,pt was holding onto the side rail. Pt stated that he landed on his buttock and also hit his left knee but denies hitting his head. Pt was assisted to his bed with wallace lift  with assist of 4 staff. Pt complained of pain\"all over\"   Personal care provided.   MD updated. Pt seen by MD. New order for x-ray given   VSS.    X-ray ordered and completed.     "

## 2023-08-13 NOTE — PROGRESS NOTES
This is unwitnessed fall around 1930.       NOC bedside RN was getting report from PM nurse when pt called for help. PM nurse went to check pt and found him sitting on the floor by the bed ( close to computer). Bed alarm was off  and call light within reach. Pt's one hand was holding to railings, other hand on the floor, L- knee were bent and R knee extended when found. Pt were covered with stool ( hands and back ) .Complained of hip pain and denies hitting his head. Pt was alert and oriented x4 when found. Pt was transferred back to bed with ceiling lift with 3 staff. Hospitalist NP aware, saw pt and xray to knee and hip in placed. Bed alarm initiated.

## 2023-08-14 NOTE — PLAN OF CARE
Pt A&O x's 4. VSS. Afebrile. 02 sats in the 90s on RA.Lungs clear. Denies SOB, CP or nausea. Tolerating regular diet. Bowel sound active in all quadrants no BM during the shift but  passing gas. Voiding adequate amount into the urinal. Pain fairly managed with current pain regimen.CMS intact per baseline. denies  any new onset of N/T. Left heel dressing clean, dry and intact. Right chest CVC patent and SL.Pt slept between care and is able to make needs known, call light with in reach. Will continue to monitor.   pt declined to be repositioned but used urinal.    Bed alarm is on

## 2023-08-14 NOTE — PROGRESS NOTES
Care Management Follow Up    Length of Stay (days): 58    Expected Discharge Date: TBD     Concerns to be Addressed: all concerns addressed in this encounter     Patient plan of care discussed at interdisciplinary rounds: Yes    Anticipated Discharge Disposition:  Return to TCU    [facility]  [address]  [address] [place] [address]  Admissions: [phone]  RN Station: [phone]     Anticipated Discharge Services:  Post acute therapies, LTC placement afterwards  Anticipated Discharge DME:  None    Patient/family educated on Medicare website which has current facility and service quality ratings:  Yes  Education Provided on the Discharge Plan:  Yes  Patient/Family in Agreement with the Plan:  Yes    Referrals Placed by CM/SW:  [facility]  Private pay costs discussed: MA for LTC eligibility and necessity to secure payment for TCU/LTC previously discussed during pt's 05/26/23 - 06/02/23 hospitalization. Pt's Financial Pollo MOSELEY, has been working on pt's MA for LTC application.     Additional Information:  Per IDT rounds, pt will be medically ready to discharge tomorrow. [facility] Liaison, Leah Diaz, reported that [facility] will likely take pt back.  Per [facility]CU protocol, pt will not be added to wait list until med ready.    SW received email correspondence from pt's nephew/Financial POPollo VALENZUELA, stating that he'll be faxing the documents pt signed to the [facility] today.        Elvia Zaragoza, ANTONETTEW, LSW  [facility]   PHONE: [phone]  Pager: [phone]

## 2023-08-14 NOTE — PROGRESS NOTES
CLINICAL NUTRITION SERVICES - REASSESSMENT NOTE     Nutrition Prescription    RECOMMENDATIONS FOR MDs/PROVIDERS TO ORDER:  Please continue to order meals for pt, even if pt does not want food. Please let pt know what is ordered and change if necessary.     Malnutrition Status:    Severe malnutrition in the context of acute on chronic illness    Recommendations already ordered by Registered Dietitian (RD):  Encouraged oral intakes    Future/Additional Recommendations:  Monitor labs, intakes, and weight trends.     EVALUATION OF THE PROGRESS TOWARD GOALS   Diet: Renal  Intake/[place]: Poorly documented    Pt ordered Juan Carlos Diaz on 8/8 and 8/10. Likely adding 1200 kcal and 81 g protein total.     Pt ordering (on average) 860 kcal and 37 g protein per day per [employer]. With documented intakes, pt is likely meeting 50% minimum energy and <50% protein needs.     NEW FINDINGS/REVIEW OF SYSTEMS    Nutrition/GI: RD met with pt at bedside. Pt did not want lunch that had been ordered but was agreeable to at least drinking the orange juice. Pt states he is frustrated by the amount of medications he is taking and it is making him nauseous. Pt reports he has gotten some food from the cafeteria brought by his brother and that it was good. Encouraged to eat out and have brother bring foods to keep in room/fridge that are more appetizing than the menu items. Pt reports that food seems to just appear without his consent at times, stating that the nurse will come into the room, order his meal and tell him it is coming up but not tell him what was ordered or ask if the food is what he wanted.      Weights: Pt with previous 81 lb (25%) weight loss in 6 months, now with 53 lb (22%) weight gain in last two months. Unsure of accuracy of last two weights. May be fluid related shifts vs bed scale inaccuracy. Seems weight stable over three weeks. No weight since 8/4 08/04/23 0900 134.4 kg (296 lb 4.8 oz) --   07/26/23 1230 134.4 kg (296  lb 6.4 oz) --   07/11/23 1035 132.2 kg (291 lb 6.4 oz) Bed scale   06/28/23 2100 126 kg (277 lb 12.5 oz) Bed scale     06/16/23 115.2 kg (254 lb)   05/26/23 110.4 kg (243 lb 4.8 oz)   05/19/23 110.4 kg (243 lb 4.8 oz)   03/23/23 117 kg (257 lb 14.4 oz)   01/09/23 119.7 kg (264 lb)   11/22/22 147.1 kg (324 lb 4.8 oz)   11/14/22 142.9 kg (315 lb)   11/09/22 146.8 kg (323 lb 9.6 oz)   05/19/22 136.1 kg (300 lb)   07/03/18 148.9 kg (328 lb 4.8 oz)     Skin: buttocks/groin and heel pressure wounds     Labs reviewed   08/10/23 06:51 08/11/23 06:07 08/12/23 12:17 08/13/23 14:28   Sodium 141 142 139 140   Potassium 3.7 3.6 4.0 3.9   Urea Nitrogen 42.1 (H) 42.1 (H) 40.4 (H) 34.5 (H)   Creatinine 2.82 (H) 2.72 (H) 2.40 (H) 2.08 (H)   Magnesium  1.4 (L) 1.4 (L) 1.2 (L)   Phosphorus 3.9 3.8 4.1 3.7   Albumin 3.2 (L) 3.2 (L) 3.0 (L) 2.8 (L)   Glucose 117 (H) 85 128 (H) 90   Iron   38 (L)    Iron Binding Capacity   149 (L)    Vitamin B12   521       Medications reviewed: Cipro, folic acid, culturell, multivitamin, thiamine, vitamin D3    MALNUTRITION  % Intake: </=75% for >/= 1 month (severe)  % Weight Loss:  Previous significant weight loss over 6 months  Subcutaneous Fat Loss:  Mild    Muscle Loss:  Moderate  Fluid Accumulation/Edema: None noted  Malnutrition Diagnosis: Severe malnutrition in the context of acute on chronic illness    Previous Goals   Patient to consume % of nutritionally adequate meal trays TID, or the equivalent with supplements/snacks.  Evaluation: Not met    Previous Nutrition Diagnosis  Inadequate oral intake related to poor appetite, menu fatigue, altered mental status as evidenced by pt and RN report and documented intakes.   Evaluation: No change    CURRENT NUTRITION DIAGNOSIS  Inadequate oral intake related to poor appetite, menu fatigue, altered mental status as evidenced by pt and RN report and documented intakes.    INTERVENTIONS  Implementation  Nutrition education for nutrition relationship  to health/disease     Goals  Patient to consume % of nutritionally adequate meal trays TID, or the equivalent with supplements/snacks.    Monitoring/Evaluation  Progress toward goals will be monitored and evaluated per protocol.    Dee Alanis MS, RDN, LD  RD pager: [phone]  WB Weekend/Holiday Pager: [phone]

## 2023-08-14 NOTE — PLAN OF CARE
"Goal Outcome Evaluation:      Patient alert and oriented x4, VSS, CMS intact. C/o 7/10, scheduled pain medication administered. Patient refused AM medications, re approached multiple times until administered at 1100. Pt seemed withdrawn and was not cooperative throughout this shift. Patient refused 1200 medication even after attempted redirection. No further concerns, continue with POC.    Patient's most recent vitals:  BP (!) 141/69   Pulse 82   Temp 98.3  F (36.8  C) (Oral)   Resp 16   Ht 1.778 m (5' 10\")   Wt 134.4 kg (296 lb 4.8 oz)   SpO2 98%   BMI 42.51 kg/m     "

## 2023-08-14 NOTE — PROGRESS NOTES
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    Neck pain secondary to multilevel cervical degenerative changes greatest at C6 and C7, all are present on admission  Patient was ruled out for fracture and osteomyelitis given his intermediate high pretest probability with change of mental status and MRSA bacteremia using advanced imaging with MRI especially in setting of worsening neck pain.  Pain is better controlled with acetaminophen 1 g every 8 hours, diclofenac 2 g 4 times daily, lidocaine patch, Robaxin 500 mg at bedtime, and tramadol at 2.5 mg every 6 hours scheduled with holding parameters.    2.  Left foot pain secondary to significant degenerative changes and unstageable left heel ulcer, follow-up.  The patient was also ruled out for fractures or osteomyelitis.  We will continue management as above.    3. MRSA bacteremia due to indwelling drain complicated by left hip septic arthritis s/p debridement and antibiotic and implant retention on 6/21/23, all are POA  The patient's bacteremia has resolved.  Blood cultures: 6/14, 6/15 + for MRSA (4/4), and 6/16 (1/2 GPCs) and 6/17 (2/2) GPCs. - started vancomycin 6/15.   6/17 sinovial fluid cultures with staph and klebsiella. Purulent drainage noted at incision site at TCU, CT left hip concerning for infection.KEILA did not show endocarditis.  The patient received ceftazidime and vancomycin for management of corynebacterium, Pseudomonas, and MRSA during this  hospitalization.   -We will continue WBAT LLE.  The patient will need imaging including XR hip to be obtained in late August. Bracing/Splinting: Abduction pillow or regular pillow while in bed.  -Continue doxycycline and ciprofloxacin through 9/13  -The patient will need infectious disease follow-up on 8/21 that can be done as outpatient  -The patient was seen in consultation with prosthetics and infectious disease    4.  Better controlled hypertension, all are POA  We will continue amlodipine 10 mg daily.  I started and increased carvedilol to 6.25 mg twice daily    5. Acute blood loss anemia and anemia of critical illness on top of anemia of chronic disease, not clear if present admission  The patient had three units of blood transfusion. We will continue oral iron, folic acid, multivitamins and thiamine. We will obtain labs once every three days to minimize blood loss through phlebotomy.     6. Resolved medical problems  Unwitnessed fall with trauma to the left lower extremity overnight 8/12, not present on admission  X-ray without evidence of fracture.  Based on discussion with orthopedics, no role for further work-up    Multifactorial encephalopathy - likely toxic metabolic, uremic, delirium, depression contributing, not clear if present on admission, resolved     Acute Kidney Injury complicated by hypernatremia on top of chronic kidney disease stage I-II, unclear if present on admission  The patient was expected to have dialysis but however he did not require during this hospitalization.  Although line was placed it was removed without complications also during this hospitalization.     Asymptomatic bacteruria (VRE)  UA obtained on 7/19 to assess for casts, reflexed to culture which is growing VRE. Labs and vitals with low concern for new infection. Discussed with ID who recommended against treatment.    Hypoxemic Respiratory Failure due to immobility, surgery, not clear iof present on admission,  "resolved    Constipation, resolved with the current regimen     Hypomagnesemia  -monitor and replete as indicated    7. Chronic medical problems:  L heel pressure wound, unstagable  We are following recommendations of wound nurse    Type 2 diabetes mellitus  Being monitored off insulin     Severe malnutrition in the context of acute on chronic illness  Following dietitian recommendations     Adjustment disorder with depressed mood and prolonged grief disorder  Patient is declined psychotropic medications    Hyperlipidemia-continue PTA Lipitor 40 mg daily    JAIR-CPAP when sleeping       Diet: Renal Diet (non-dialysis)    DVT Prophylaxis: DOAC  Tran Catheter: Not present  Lines: PRESENT      CVC Single Lumen Right Internal jugular Non - valved (open ended);Tunneled;Power injectable-Site Assessment: WDL        Cardiac Monitoring: None  Code Status: Full Code      Clinically Significant Risk Factors            # Hypomagnesemia: Lowest Mg = 1.2 mg/dL in last 2 days, will replace as needed   # Hypoalbuminemia: Lowest albumin = 2.2 g/dL at 7/8/2023  8:51 AM, will monitor as appropriate            # Severe Obesity: Estimated body mass index is 42.51 kg/m  as calculated from the following:    Height as of this encounter: 1.778 m (5' 10\").    Weight as of this encounter: 134.4 kg (296 lb 4.8 oz).     # Severe Malnutrition: based on nutrition assessment             Disposition Plan      Expected Discharge Date: 08/15/2023, 12:00 PM      Discharge Comments: Should be medically ready within the upcoming two days, then will be pending TCU placement Pending care conference as patient states desire for rehab but as of yet has not sufficiently participated in therapy to qualify.        Porfirio Stokes MD  Hospitalist Service, GOLD TEAM [address] [place] [address]  Securely message with [employer] (more info)  Text page via [facility] Paging/Directory   See signed in provider for up to date coverage " information  ______________________________________________________________________    Interval History   The patient reported improvement in pain scores with tramadol. No subjective fever or chills.     Physical Exam   Vital Signs: Temp: 97.7  F (36.5  C) Temp src: Oral BP: 137/68 Pulse: 78   Resp: 16 SpO2: 93 % O2 Device: None (Room air)    Weight: 296 lbs 4.77 oz  Physical Exam  Vitals reviewed.   Constitutional:       Comments: Reclined in bed with head of bed elevated eating eggs and oatmeal.    HENT:      Head: Atraumatic.      Nose: Nose normal.   Eyes:      Conjunctiva/sclera: Conjunctivae normal.   Cardiovascular:      Rate and Rhythm: Normal rate.      Pulses: Normal pulses.   Pulmonary:      Effort: Pulmonary effort is normal. No respiratory distress.      Breath sounds: No wheezing or rales.   Abdominal:      General: There is no distension.      Palpations: Abdomen is soft.      Tenderness: There is no abdominal tenderness.   Musculoskeletal:      Right lower leg: No edema.      Left lower leg: No edema.      Comments: Feet in soft boots bilaterally. Significant pain with light touch of skin of left foot, though color normal, no swelling, pulses intact. He states this has been present a month.    Skin:     General: Skin is warm.      Capillary Refill: Capillary refill takes less than 2 seconds.   Neurological:      Mental Status: He is alert.      Comments: Debility is diffuse. Able to wiggle toes bilaterally, use arms to eat from tray though with some tremor.    Psychiatric:      Comments: Affect somewhat irritable. He expressed confidence that he could walk with people helping him although he has not been out of bed for a very long time.       Medical Decision Making       55 MINUTES SPENT BY ME on the date of service doing chart review, history, exam, documentation & further activities per the note.      Data     I have personally reviewed the following data over the past 24 hrs:    7.1  \   7.0 (L)    / 265     142 107 38.7 (H) /  94   4.1 23 2.48 (H) \     ALT: 11 AST: 16 AP: 72 TBILI: 0.2   ALB: 3.3 (L) TOT PROTEIN: 6.3 (L) LIPASE: N/A       Imaging results reviewed over the past 24 hrs:   Recent Results (from the past 24 hour(s))   MR CERVICAL SPINE W/O & W CONTRAST    Narrative    EXAM: MR CERVICAL SPINE W/O & W CONTRAST  8/13/2023 7:57 PM     HISTORY:  spondylosis, worsening pain, no fracture based on xray; Neck  pain; r/o Spinal infection; Neck pain with rest; No  known/automatically detected potential contraindications to imaging       COMPARISON:  Cervical spine radiographs 8/10/2023.    TECHNIQUE: Sagittal T1-weighted and T2-weighted and axial T2-weighted  images of the cervical spine were obtained without intravenous  contrast. Post intravenous contrast using gadolinium axial and  sagittal T1-weighted images were obtained with fat saturation. Some  sequences were repeated due to patient motion during initial image  acquisition.    CONTRAST: 13 mL Gadavist.    FINDINGS:  Motion degraded examination. Normal cervical vertebral alignment.  Degenerative marrow edema in the opposing endplates at C6-7.  Multilevel disc dehydration and loss of intervertebral disc height,  greatest at C5-6 and C6-7. No cord signal abnormality.    No abnormal enhancement of the cervical spinal cord or otherwise  within the spinal canal. No cervical mass lesion demonstrated.    The findings on a level by level basis are as follows:    C2-3: Left facet hypertrophy. No spinal canal or neural foraminal  stenosis.     C3-4: Left greater than right facet hypertrophy. Mild right and  moderate left neural foraminal stenosis. Mild spinal canal narrowing..    C4-5: Left greater than right facet hypertrophy. Moderate left and  mild right neural foraminal narrowing. No spinal canal stenosis.    C5-6: Left greater than right uncinate and facet hypertrophy. Moderate  left and mild right neural foraminal narrowing. Mild spinal  canal  narrowing.    C6-7: Disc osteophyte complex and bilateral uncinate hypertrophy. Left  greater than right facet hypertrophy. Mild right and moderate left  neural foraminal stenosis. Moderate spinal canal stenosis.    C7-T1: Left facet hypertrophy. Mild left neural foraminal narrowing.  No right neural foraminal stenosis. No spinal canal stenosis.      Impression    IMPRESSION:  1. Motion degraded examination.  2. Multilevel cervical degenerative changes, greatest at C6-7.  3. No myelopathic cord signal.  4. No fluid collection or abscess.    SERENA DAHL MD         SYSTEM ID:  [id number]

## 2023-08-14 NOTE — PLAN OF CARE
"[id number]  /55 (BP Location: Left arm, Patient Position: Semi-Fuentes's, Cuff Size: Adult Regular)   Pulse 72   Temp 98  F (36.7  C) (Oral)   Resp 16   Ht 1.778 m (5' 10\")   Wt 104 kg (229 lb 4.5 oz)   SpO2 94%   BMI 32.90 kg/m       No acute changes this shift.  AOx3, forgetful.  ORA.  Endorsing generalized pain to posterior neck and LLE managed with sched Tylenol and Tramadol.  B/L neuropathy BLE.  Up in recliner in the evening.  A2 lift.  Renal diet.  BG check BID.  Can be incont B/B, using bedside urinal.  L heel dressing changed.  R CVC HL.     Plan: TCU, medically ready to discharge 8/15  Additional: RN manage K+ WNL; Mag (1.3) replaced, recheck this AM.  Pt refused multiple meds this shift.     Pt able to make needs known, call light within reach.  BA on, freq rounding.  Cont precaut maintained.  Care ongoing.     "

## 2023-08-15 NOTE — PROGRESS NOTES
[facility] Nurse Inpatient Assessment     Consulted for: Left heel    7/25/2023: Attempted to see previously for buttocks. Patient continues to flatly refuse assessment for buttocks today. Per bedside RN, buttocks is red but blanchable, patient continues to be incontinent of urine and stool.       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  Postop course complicated by profound deconditioning.  He was admitted to TCU initially on 12/23/2022 for physical rehabilitation, however therapies no longer worked with patient after several months of an in bed therapy and his refusals to mobilize.  Underwent biopsy on 4/7/2023 without evidence of infection.  He was transferred back to [facility] for left total hip arthroplasty and reimplantation on 5/26/23 with Dr. Meyers.  Presented again to [facility] for persistent MRSA bacteremia on 6/17.     Assessment:      Areas visualized during today's visit: Lower extremities     Pressure Injury Location: Left heel    Last photo: 8/15  Wound type: Pressure Injury     Pressure Injury Stage: Unstageable, hospital acquired, assessed with SANDRA Cook on 7/28  Wound history/plan of care:  Pt known to [facility] service. Pt known to decline cares and repositioning.   7/19 initial assessment: Spent approx 20 mins on negotiating how to move leg to be able to minimally assess wound. Pt agreed to see picture of wound. This writer explained wound to pt using the photo. Discussed with patient if he would like legs elevated on pillows or boots. He agreed to pillows.    7/20: Assessed wound with  Kristen Hadley RN CWOCN. Confirmed thick slough/eschar wound base. Again discussed keeping heels off bed with pt.  7/25/2023: Of note, patient has refused and continues to refuse heel off-loading boots on assessment today.  7/28: pt had heel lift boots in place  Wound base: 100 % slough/eschar     Palpation of the wound bed: boggy      Drainage: scant     Description of drainage: serosanguinous     Measurements (length x width x depth, in cm) 3 x 3 x 0.2 cm     Tunneling N/A     Undermining N/A  Periwound skin: Intact      Color: normal and consistent with surrounding tissue      Temperature: normal   Odor: none  Pain: severe, shooting and stabbing  Pain intervention prior to dressing change: slow and gentle cares   Treatment goal: Infection control/prevention and soften nonviable tissue  STATUS: evolving  Supplies ordered: discussed with RN and discussed with patient     Wound location: left buttock and gluteal cleft    Last photo: 8/15  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)  Wound history/plan of care: pt is incontinent and has previously refused assessment of area by WOC  Wound base: 100 % epidermis      Palpation of the wound bed: normal      Drainage: none     Description of drainage: none     Measurements (length x width x depth, in cm): see photo      Tunneling: N/A     Undermining: N/A  Periwound skin: Intact and Erythema- blanchable      Color: red      Temperature: normal   Odor: none  Pain: moderate, sharp- mostly related to left hip pain  Pain interventions prior to dressing change: slow and gentle cares   Treatment goal: Maintain (prevention of deterioration) and Protection  STATUS: improving  Supplies ordered: at bedside, discussed with RN, and discussed with patient      Wound Location: Left medial thigh- no WOC consult     On assessment of patient bloody mucous noted on blue pad and when cleaning buttocks no mucous noted from rectum but upon cleansing of perineal area there was an  indurated area noted to left inner thigh with bloody pus-like drainage. Pointed out abscess to floor RN and left area BRII to drain.      Treatment Plan:     Bilateral buttocks and gluteal cleft wounds: BID and PRN with incontinence episodes  Cleanse wound with dry wipes and martell cleanse and protect spray.   Apply light layer of Triad paste (#293000) to wounds and red skin on bilateral buttocks  With repeat application, do not scrub the paste, only remove soiled paste and reapply.  If complete removal of paste is necessary use baby oil/mineral oil (#035163) and soft wash cloth.  Ensure pt has Isaac-cushion (#464672) while sitting up in the chair.  Use only one Covidien pad in between mattress and pt. No brief in bed.    Left heel wound(s): Every other day   Strongly encourage pt to elevate heels on pillows to float off bed surface at all times. (He agreed to this with WOC)  Cleanse wound with wound cleanser.   Apply light layer of Triad paste (#305835) to wound bed  Cut piece of adaptic to cover paste and wound  Secure with mepilex.  If patient agrees to heel off-loading boots, please apply.    Orders: Reviewed    RECOMMEND PRIMARY TEAM ORDER: None, at this time  Education provided: importance of repositioning, plan of care, wound progress and Infection prevention   Discussed plan of care with: Patient and Nurse  WOC nurse follow-up plan: weekly, stable POC  Notify WOC if wound(s) deteriorate.  Nursing to notify the Provider(s) and re-consult the WOC Nurse if new skin concern.    DATA:     Current support surface: Bariatric Low air loss (BIN pump, Isolibrium, Pulsate, skin guard, etc)  Containment of urine/stool: Incontinence Protocol  BMI: Body mass index is 32.9 kg/m .   Active diet order: Orders Placed This Encounter      Renal Diet (non-dialysis)     Output: I/O last 3 completed shifts:  In: 200 [P.O.:200]  Out: 350 [Urine:350]     Labs:   Recent Labs   Lab 08/14/23  0832   ALBUMIN 3.3*   HGB 7.0*   WBC 7.1        Pressure injury risk assessment:   Sensory Perception: 3-->slightly limited  Moisture: 3-->occasionally moist  Activity: 2-->chairfast  Mobility: 2-->very limited  Nutrition: 1-->very poor  Friction and Shear: 2-->potential problem  Rafael Score: 13    Mona Horton RN CWOCN  Pager no longer is use, please contact through [employer] group: [facility] Nurse [facility]  Dept. Office Number: [phone]

## 2023-08-15 NOTE — PROGRESS NOTES
"/57 (BP Location: Left arm)   Pulse 68   Temp 97.5  F (36.4  C) (Oral)   Resp 16   Ht 1.778 m (5' 10\")   Wt 104 kg (229 lb 4.5 oz)   SpO2 98%   BMI 32.90 kg/m       AOx3, forgetful. Was found half OOB with bed alarm going off. He didn't know where he was. He reoriented and is no compliant. ORA.  Endorsing generalized pain to posterior neck and LLE managed with sched Tylenol and Tramadol.  B/L neuropathy BLE.  Up at edge of bed with PT.   A2 lift.  Renal diet.  Can be incont B/B, LBM 8/12 and refused bowel meds today, using bedside urinal.  L heel dressing changed per WOC.  R CVC HL. Ate a [employer] Baconator, Large Rootbeer, and a strawberry Frosty.      Plan: [facility] TCU, medically ready to discharge 8/16  Additional: RN manage K+ and Mg WNL       Pt able to make needs known, call light within reach.  BA on, freq rounding.  Cont precaut maintained.  "

## 2023-08-15 NOTE — PROGRESS NOTES
"Responded to alarm in Don's room and found him partly OOB with legs hanging over the edge of the bed. Confused and wondering if he had moved to his room yet (pt to go to  TCU soon). Reoriented Rahat and he is unsure why he is confused, \"maybe because I am alone\".    Boosted and centered in bed with left ankle elevated overhanging two pillows. Bed alarm on and call light within reach. Call light education provided.  "

## 2023-08-15 NOTE — PROGRESS NOTES
Care Management Follow Up    Length of Stay (days): 59    Expected Discharge Date: 08/16/23     Concerns to be Addressed: Discharge planning  Patient plan of care discussed at interdisciplinary rounds: Yes    Anticipated Discharge Disposition: Transitional Care    [facility]  [address]  4th [place] [address]  Admissions: [phone]  RN Station: [phone]      Anticipated Discharge Services:  (Post acute therapies, wound care)  Anticipated Discharge DME: None    Patient/family educated on Medicare website which has current facility and service quality ratings: yes  Education Provided on the Discharge Plan: Yes  Patient/Family in Agreement with the Plan: yes    Referrals Placed by CM/SW:  ([facility])  Private pay costs discussed: MA for LTC eligibility and necessity to secure payment for TCU/LTC previously discussed during pt's 05/26/23 - 06/02/23 hospitalization. Pt's Financial POA, Pollo Bustos, has been working on pt's MA for LTC application.     Additional Information:  Per IDT rounds, pt can go to [facility] as early as today, pending insurance auth. [facility] liaison, Leah Diaz, confirmed that new PAS should be completed.    1025: ZEB met with pt and bedside nurse at bedside, provided update on return to [facility] today vs tomorrow, pending insurance auth. SW discussed IMM; pt signed, declined copy. No further questions or concerns re: discharge plan.     PAS Code:  [id number]    1600: No update from [facility] liaison. Anticipate pt will admit to [facility] tomorrow (08/16/23).        VANESA Minor, LSW  [facility]   PHONE: [phone]  Pager: [phone]

## 2023-08-15 NOTE — PROGRESS NOTES
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    # Neck pain secondary to multilevel cervical degenerative changes greatest at C6 and C7  # Left foot pain secondary to significant degenerative changes and unstageable left heel ulcer, follow-up.  Patient was ruled out for fracture and osteomyelitis given his intermediate high pretest probability with change of mental status and MRSA bacteremia using advanced imaging with MRI especially in setting of worsening neck and foot pain.  Pain is better controlled with acetaminophen 1 g every 8 hours, diclofenac 2 g 4 times daily, lidocaine patch, Robaxin 500 mg at bedtime, and tramadol at 2.5 mg every 6 hours scheduled with holding parameters.    # MRSA bacteremia due to indwelling drain complicated by left hip septic arthritis s/p debridement and antibiotic and implant retention on 6/21/23, all are POA  The patient's bacteremia has resolved.  Blood cultures: 6/14, 6/15 + for MRSA (4/4), and 6/16 (1/2 GPCs) and 6/17 (2/2) GPCs. - started vancomycin 6/15.   6/17 sinovial fluid cultures with staph and klebsiella. Purulent drainage noted at incision site at TCU, CT left hip concerning for infection.KEILA did not show endocarditis.  The patient received ceftazidime and vancomycin for management of corynebacterium, Pseudomonas, and MRSA during this hospitalization.  - Continue WBAT LLE, will need imaging including XR hip to be obtained in late August.   - Bracing/Splinting:  Abduction pillow or regular pillow while in bed.  - Continue doxycycline and ciprofloxacin through 9/13  -The patient will need infectious disease follow-up on 8/21 that can be done as outpatient  -The patient was seen in consultation with prosthetics and infectious disease    # Hypertension  - Cont PTA amlodipine 10 mg daily  - Initiated on carvedilol 6.25 mg twice daily    5. Acute blood loss anemia and anemia of critical illness on top of anemia of chronic disease, not clear if present admission  - The patient had three units of blood transfusion  - Continue oral iron, folic acid, multivitamins and thiamine.   - Will obtain labs once every three days to minimize blood loss through phlebotomy.     6. Resolved medical problems  Unwitnessed fall with trauma to the left lower extremity overnight 8/12, not present on admission  X-ray without evidence of fracture.  Based on discussion with orthopedics, no role for further work-up    Multifactorial encephalopathy - likely toxic metabolic, uremic, delirium, depression contributing, not clear if present on admission, resolved     Acute Kidney Injury complicated by hypernatremia on top of chronic kidney disease stage I-II, unclear if present on admission  The patient was expected to have dialysis but however he did not require during this hospitalization.  Although line was placed it was removed without complications also during this hospitalization.     Asymptomatic bacteruria (VRE)  UA obtained on 7/19 to assess for casts, reflexed to culture which is growing VRE. Labs and vitals with low concern for new infection. Discussed with ID who recommended against treatment.    Hypoxemic Respiratory Failure due to immobility, surgery, not clear iof present on admission, resolved    Constipation, resolved with the current regimen     Hypomagnesemia  -monitor and replete as indicated    7. Chronic medical problems:  L heel pressure wound, unstagable  We are following  "recommendations of wound nurse    Type 2 diabetes mellitus  Being monitored off insulin     Severe malnutrition in the context of acute on chronic illness  Following dietitian recommendations     Adjustment disorder with depressed mood and prolonged grief disorder  Patient is declined psychotropic medications    Hyperlipidemia-continue PTA Lipitor 40 mg daily    JAIR-CPAP when sleeping       Diet: Renal Diet (non-dialysis)    DVT Prophylaxis: DOAC  Tran Catheter: Not present  Lines: PRESENT      CVC Single Lumen Right Internal jugular Non - valved (open ended);Tunneled;Power injectable-Site Assessment: WDL        Cardiac Monitoring: None  Code Status: Full Code      Clinically Significant Risk Factors            # Hypomagnesemia: Lowest Mg = 1.3 mg/dL in last 2 days, will replace as needed   # Hypoalbuminemia: Lowest albumin = 2.2 g/dL at 7/8/2023  8:51 AM, will monitor as appropriate            # Obesity: Estimated body mass index is 32.9 kg/m  as calculated from the following:    Height as of this encounter: 1.778 m (5' 10\").    Weight as of this encounter: 104 kg (229 lb 4.5 oz).     # Severe Malnutrition: based on nutrition assessment             Disposition Plan      Expected Discharge Date: 08/15/2023, 12:00 PM      Discharge Comments: Should be medically ready within the upcoming two days, then will be pending TCU placement Pending care conference as patient states desire for rehab but as of yet has not sufficiently participated in therapy to qualify.        Cornelio Ho MD  Hospitalist Service, GOLD TEAM 21  [facility]  Securely message with [employer] (more info)  Text page via [facility] Paging/Directory   See signed in provider for up to date coverage information  ______________________________________________________________________    Interval History   No acute events overnight, pain well controlled     Physical Exam   Vital Signs: Temp: 97.6  F (36.4  C) Temp " src: Oral BP: (!) 147/55 Pulse: 75   Resp: 16 SpO2: 96 % O2 Device: None (Room air)    Weight: 229 lbs 4.45 oz  Constitutional: Awake, alert, cooperative, no apparent distress  Respiratory: Clear to auscultation bilaterally, no crackles or wheezing  Cardiovascular: Regular rate and rhythm  GI: Normal bowel sounds, soft, non-distended, non-tender  Skin/Integumen: No rashes, no cyanosis       Medical Decision Making       55 MINUTES SPENT BY ME on the date of service doing chart review, history, exam, documentation & further activities per the note.      Data     I have personally reviewed the following data over the past 24 hrs:    6.6  \   7.0 (L)   / 253     139 105 38.4 (H) /  86   4.0 24 2.34 (H) \     ALT: 12 AST: 17 AP: 72 TBILI: 0.2   ALB: 3.1 (L) TOT PROTEIN: 6.1 (L) LIPASE: N/A       Imaging results reviewed over the past 24 hrs:   No results found for this or any previous visit (from the past 24 hour(s)).

## 2023-08-16 PROBLEM — Z96.649: Status: ACTIVE | Noted: 2023-01-01

## 2023-08-16 PROBLEM — T84.018S: Status: ACTIVE | Noted: 2023-01-01

## 2023-08-16 PROBLEM — N17.0 ACUTE RENAL FAILURE WITH TUBULAR NECROSIS (H): Status: ACTIVE | Noted: 2023-01-01

## 2023-08-16 PROBLEM — T84.50XA PROSTHETIC JOINT INFECTION, INITIAL ENCOUNTER (H): Status: ACTIVE | Noted: 2022-01-01

## 2023-08-16 NOTE — PROGRESS NOTES
"  VS: /62 (BP Location: Left arm)   Pulse 71   Temp 98  F (36.7  C) (Oral)   Resp 18   Ht 1.778 m (5' 10\")   Wt 104 kg (229 lb 4.5 oz)   SpO2 95%   BMI 32.90 kg/m     O2: RA, denies SOB and CP   Output: Voiding in urinal   Last BM: 8/16 small in bedpan   Activity: Assist 2 turning/reposition, lift device when OOB   Skin: Redness to buttocks, L hip incision open to air   Pain: Managed with scheduled tylenol and tramadol    Neuro: A/Ox4, some confusion and irritability redirectable    Dressing: L heel CDI   Diet: Renal diet, poor appetite    LDA: R chest port a cath   Equipment: Bedpan, bed alarm on, urinal, personal belongings, call light within reach   Plan: Transfer to TCU, SW following   Additional Info:      "

## 2023-08-16 NOTE — PROGRESS NOTES
TCU SW emailed FC to find out if Application was in deed in.  FERDINAND/Catrachito did talk to [facility].  They do have application but missing pieces.   ZEB emailed Missing pieces to Pollo/Nephkimberly/PRADIP.  Pollo will get missing pieces in the next couple of days back to the [facility].     NEREYDA Sharp   [facility], Transitional Care Unit   Social Work   [address] [place] [address], [address] [place] [address] () [phone]

## 2023-08-16 NOTE — PROGRESS NOTES
TCU SW emailed FC to find out if Application was in deed in.  FERDINAND/Catrachito did talk to [facility].  They do have application but missing pieces.   ZEB emailed Missing pieces to Pollo/Nephkimberly/PRADIP.  Pollo will get missing pieces in the next couple of days back to the [facility].     NEREYDA Sharp   [facility], Transitional Care Unit   Social Work   [address] [place] [address], [address] [place] [address] () [phone]

## 2023-08-16 NOTE — PROGRESS NOTES
"  VS: BP (!) 145/63 (BP Location: Left arm)   Pulse 73   Temp 97.3  F (36.3  C) (Oral)   Resp 16   Ht 1.778 m (5' 10\")   Wt 104 kg (229 lb 4.5 oz)   SpO2 96%   BMI 32.90 kg/m      O2: 96 RA   Output: Adequate; urinal at bedside   Last BM: 8/12/23- denied senna and miralax   Activity: Pulls up in bed, wants to sit up in bed, bed alarm on- needs lift transfer x2   Skin: Intact; left heel dressing    Pain: Generalized pain 10/10- managed with Scheduled pain meds.    CMS: Denies numbness, tingling, chest pain, SOB, headache   Dressing: L heel dressing changed per WOC orders   Diet: Renal diet; refused breakfast and lunch today, encouraged but still refused.    LDA: Right chest port- heparin locked   Equipment: urinal   Plan: Anticipated to discharge to TCU tomorrow   Additional Info:       "

## 2023-08-16 NOTE — DISCHARGE SUMMARY
"[facility]  Hospitalist Discharge Summary      Date of Admission:  6/17/2023  Date of Discharge:  8/16/2023  Discharging Provider: Cornelio Ho MD  Discharge Service: Hospitalist Service, GOLD TEAM 21    Discharge Diagnoses     Please see Hospital Course below     Clinically Significant Risk Factors     # Obesity: Estimated body mass index is 32.9 kg/m  as calculated from the following:    Height as of this encounter: 1.778 m (5' 10\").    Weight as of this encounter: 104 kg (229 lb 4.5 oz).  # Severe Malnutrition: based on nutrition assessment      Follow-ups Needed After Discharge   Follow-up Appointments     Adult [facility] Follow-up and recommended labs and tests      Follow up with Infectious diseases at the [facility] on 8/21       Follow up with Orthopedics, you will get a call for follow up     Appointments on [place] and/or [facility] (with [facility] or [facility]   provider or service). Call [phone] if you haven't heard regarding   these appointments within 7 days of discharge.            Unresulted Labs Ordered in the Past 30 Days of this Admission       No orders found from 5/18/2023 to 6/18/2023.        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 Patient started on IV vancomycin and ID consulted. Left hip ultrasound " negative for abscess/fluid. He is recommended for KEILA due to previous poor windows with TTE, thus was transferred to [place] on 6/17, KEILA negative and transferred [facility] to  for ongoing care. He was followed by ID, Ortho. The following problems were addressed while inpatient:      # MRSA bacteremia, resolved  # Left hip septic arthritis s/p debridement and antibiotic and implant retention on 6/21/23,  The patient's bacteremia has resolved.  Blood cultures positive for MRSA, started vancomycin 6/15.   6/17 sinovial fluid cultures with staph and klebsiella. Purulent drainage noted at incision site at TCU, CT left hip concerning for infection.KEILA did not show endocarditis.  The patient received ceftazidime and vancomycin for management of corynebacterium, Pseudomonas, and MRSA during this hospitalization and transitioned to oral abx per ID for long term treatment.   Ortho Plan:  - Activity: Up with assist until independent. Posterior hip precautions x 3 months (no flexion beyond 90 degrees, no adduction beyond midline, and no internal rotation beyond midline).  - Weightbearing Status: WBAT LLE.  - Pain Management: Transition from IV to PO as tolerated.  - Bracing/Splinting: Abduction pillow or regular pillow while in bed.  - Continue doxycycline and ciprofloxacin through 9/13  -The patient will need infectious disease follow-up on 8/21 that can be done as outpatient  -The patient was seen in consultation with prosthetics and infectious disease     # Hypertension  - Cont PTA amlodipine 10 mg daily  - Initiated on carvedilol 6.25 mg twice daily     # Acute blood loss anemia and anemia of critical illness on top of anemia of chronic disease  - The patient had three units of blood transfusion early in his hospitalization  - Continue oral iron, folic acid, multivitamins and thiamine.   - Minimize lab draws    # Neck pain secondary to multilevel cervical degenerative changes greatest at C6 and C7  # Left foot pain  secondary to significant degenerative changes and unstageable left heel ulcer, follow-up.  Patient was ruled out for fracture and osteomyelitis using advanced imaging with MRI especially in setting of worsening neck and foot pain.  Pain is better controlled with acetaminophen 1 g every 8 hours, diclofenac 2 g 4 times daily, lidocaine patch, Robaxin 500 mg at bedtime, and tramadol at 25 mg every 6 hours scheduled with holding parameters.     # Multifactorial encephalopathy, resolved  - likely toxic metabolic, in the setting of bacteremia      # HALEY on CKD, resolved  # Hypernatremia, resolved  The patient was expected to have dialysis but however he did not require during this hospitalization.  Although line was placed it was removed without complications also during this hospitalization  - CTM     Asymptomatic bacteruria (VRE)  UA obtained on 7/19 to assess for casts, reflexed to culture which is growing VRE. Labs and vitals with low concern for new infection. Discussed with ID who recommended against treatment.     Constipation, resolved with the current regimen     Hypomagnesemia  -monitor and replete as indicated     Chronic medical problems:  L heel pressure wound, unstagable  We are following recommendations of wound nurse     Type 2 diabetes mellitus, resolved  - A1c 5.6, has not required insulin and discontinued from home medication      Severe malnutrition in the context of acute on chronic illness  Following dietitian recommendations     Adjustment disorder with depressed mood and prolonged grief disorder  Patient is declined psychotropic medications     Hyperlipidemia-continue PTA Lipitor 40 mg daily     JAIR-CPAP when sleeping       Consultations This Hospital Stay   NUTRITION SERVICES ADULT IP CONSULT  PHYSICAL THERAPY ADULT IP CONSULT  OCCUPATIONAL THERAPY ADULT IP CONSULT  PHARMACY TO DOSE VANCO  INFECTIOUS DISEASE GENERAL ADULT IP CONSULT  ORTHOPAEDIC SURGERY ADULT/PEDS IP CONSULT  NURSING TO CONSULT  FOR VASCULAR ACCESS CARE IP CONSULT  CARE MANAGEMENT / SOCIAL WORK IP CONSULT  WOUND OSTOMY CONTINENCE NURSE  IP CONSULT  VASCULAR ACCESS ADULT IP CONSULT  NEPHROLOGY GENERAL ADULT IP CONSULT  VASCULAR ACCESS ADULT IP CONSULT  INTERVENTIONAL RADIOLOGY ADULT/PEDS IP CONSULT  PALLIATIVE CARE ADULT IP CONSULT  PSYCHIATRY IP CONSULT  NEPHROLOGY GENERAL ADULT IP CONSULT  NUTRITION SERVICES ADULT IP CONSULT  NEUROLOGY GENERAL ADULT IP CONSULT  PALLIATIVE CARE ADULT IP CONSULT  PSYCHIATRY IP CONSULT  WOUND OSTOMY CONTINENCE NURSE  IP CONSULT  OCCUPATIONAL THERAPY ADULT IP CONSULT  INFECTIOUS DISEASE WEST BANK ADULT IP CONSULT  INTERVENTIONAL RADIOLOGY ADULT/PEDS IP CONSULT  INTERVENTIONAL RADIOLOGY ADULT/PEDS IP CONSULT  PSYCHIATRY IP CONSULT  NEUROLOGY GENERAL ADULT IP CONSULT  NUTRITION SERVICES ADULT IP CONSULT  PHYSICAL THERAPY ADULT IP CONSULT  OCCUPATIONAL THERAPY ADULT IP CONSULT  PSYCHIATRY IP CONSULT  PSYCHIATRY IP CONSULT  INTERVENTIONAL RADIOLOGY ADULT/PEDS IP CONSULT  PHARMACY IP CONSULT  PHYSICAL THERAPY ADULT IP CONSULT  ORTHOPAEDIC SURGERY ADULT/PEDS IP CONSULT  PHYSICAL THERAPY ADULT IP CONSULT    Code Status   Full Code    Time Spent on this Encounter   I, Cornelio Ho MD, personally saw the patient today and spent greater than 30 minutes discharging this patient.       Cornelio Ho MD  [facility]  [address] [place] [address]  Phone: [phone]  Fax: [phone]  ______________________________________________________________________    Physical Exam   Vital Signs: Temp: 97.3  F (36.3  C) Temp src: Oral BP: (!) 145/63 Pulse: 73   Resp: 16 SpO2: 96 % O2 Device: None (Room air)    Weight: 229 lbs 4.45 oz    Constitutional:Awake, alert, cooperative, no apparent distress  Respiratory: Clear to auscultation bilaterally  Cardiovascular: Regular rate and rhythm, normal S1 and S2, and no murmur noted, no edema  GI: Normal bowel sounds, soft,  non-distended, non-tender  Skin/Integumen: No rashes, no cyanosis         Primary Care Physician   YUE MEDLEY    Discharge Orders      Palliative Care Referral      Infectious Disease Referral      General info for SNF    Length of Stay Estimate: Short Term Care: Estimated # of Days <30  Condition at Discharge: Improving  Level of care:skilled   Rehabilitation Potential: Good  Admission H&P remains valid and up-to-date: Yes  Recent Chemotherapy: N/A  Use Nursing Home Standing Orders: Yes     Mantoux instructions    Give two-step Mantoux (PPD) Per Facility Policy Yes     Reason for your hospital stay    You were admitted for MRSA bacteremia and osteomyelitis (bone infection), will need rehab and ongoing oral antibiotics.     Activity - Up with nursing assistance     Adult [facility] Follow-up and recommended labs and tests    Follow up with Infectious diseases at the [facility] on 8/21     Follow up with Orthopedics, you will get a call for follow up     Appointments on [place] and/or [facility] (with [facility] or [facility] provider or service). Call [phone] if you haven't heard regarding these appointments within 7 days of discharge.     Full Code     Physical Therapy Adult Consult    Evaluate and treat as clinically indicated.    Reason:  Debility     Diet    Follow this diet upon discharge: Orders Placed This Encounter      Calorie Counts      Renal Diet (non-dialysis)       Significant Results and Procedures   Most Recent 3 CBC's:  Recent Labs   Lab Test 08/15/23  0856 08/14/23  0832 08/13/23  1428   WBC 6.6 7.1 5.7   HGB 7.0* 7.0* 7.9*   MCV 96 95 96    265 222     Most Recent 3 BMP's:  Recent Labs   Lab Test 08/16/23  0205 08/15/23  0856 08/15/23  0155 08/14/23  1128 08/14/23  0832 08/13/23  2219 08/13/23  1428   NA  --  139  --   --  142  --  140   POTASSIUM  --  4.0  --   --  4.1  --  3.9   CHLORIDE  --  105  --   --  107  --  109*   CO2  --  24  --   --  23  --  22   BUN  --  38.4*   --   --  38.7*  --  34.5*   CR  --  2.34*  --   --  2.48*  --  2.08*   ANIONGAP  --  10  --   --  12  --  9   MAIKOL  --  8.6*  --   --  8.6*  --  7.5*   * 86 85   < > 87   < > 90    < > = values in this interval not displayed.   ,   Results for orders placed or performed during the hospital encounter of 06/17/23   XR Pelvis w Hip Left 1 View    Narrative    Single views pelvis radiograph(s) 6/19/2023 11:07 AM    History: Hx of multiple prosthetic hip infections, currently with MRSA  bacteremia    Comparison: 6/10/2023    Findings:    AP view(s) of the pelvis and left hip were obtained.     Post surgical changes of left total hip arthroplasty revision with  longstem component in the femur. Surgical drain in projects over the  left hip. Small amount of subcutaneous emphysema projecting over the  joint space. No periprosthetic lucencies or evidence of hardware  complication. No new acute osseous abnormality. Healing proximal  femoral osteotomy.    Sacrum and innominate bones are partially obscured by overlying bowel  gas/fecal content.    Pelvic phlebolith.    Diffuse osteopenia.      Impression    Impression:  1. Stable post surgical changes of left total hip arthroplasty  revision. No evidence of hardware failure. Ongoing healing of proximal  left femoral osteotomy.  2. Subcutaneous emphysema projecting over the joint space may be  secondary to surgical drain versus infection.    I have personally reviewed the examination and initial interpretation  and I agree with the findings.    GERI LOYOLA MD (Joe)         SYSTEM ID:  [id number]   CT Hip Left w/o Contrast    Narrative    EXAM: CT HIP LEFT W/O CONTRAST  6/19/2023 11:32 AM      HISTORY: assess for abscess; Hip pain; Infection, known or r/o; Hip  x-ray result available; No known/automatically detected potential  contraindications to imaging    COMPARISON: Radiographs same-day, CT 7/6/2018     TECHNIQUE: CT imaging of the left hip without IV contrast.  Multiplanar  reconstructions.    FINDINGS:      images: Revision total left hip arthroplasty. Left-sided  surgical drain.    Postsurgical changes of revision total left hip arthroplasty and  proximal femoral osteotomy the distal extent of which is excluded from  provided field-of-view. Possible femoral cerclage wire. No evidence of  hardware complication.    Expansile lytic lesion in the medial left ilium today measuring up to  3.6 x 5.6 cm in the axial plane, previously 3.4 x 5.2 cm on CT  7/6/2018. Areas of associated cortical thinning and possible  dehiscence, likely progressed compared to prior outside CT 10/25/2022  (series 3 image 23, for example).    Left-sided percutaneous drainage catheter terminates posterior lateral  to the posterior superior acetabulum. Scattered foci of subcutaneous  gas about the left hip, especially anteriorly. Soft tissue density  throughout this region. Assessment of discrete fluid collection  difficult on this noncontrast study compromised by metallic beam  hardening artifact. Partially visualized fluid collection  superficial-medial to the proximal adductor longus today measuring up  to 3.0 x 6.7 cm in the axial plane, previously 5.5 x 2.1 cm on  10/25/2022. The caudal extent is incompletely imaged. Soft tissue  stranding on the lateral aspect of the hip extending through the  overlying gluteal musculature.    Trace free fluid in the pelvis. Vascular calcifications.       Impression    IMPRESSION:     1. Soft tissue stranding and multiple foci of subcutaneous emphysema  about the left hip, especially anteriorly. Subcutaneous gas may be  related to percutaneous drain however infection not excluded by  imaging.  *  Partially visualized fluid collection within subcutaneous fat  superficial-medial to the proximal adductor longus (caudal extent of  which is incompletely visualized) which appears enlarged compared to  outside CT 10/25/2022. Infection/abscess not excluded.    2.  Revision total left hip arthroplasty without evidence of hardware  complication. Healing proximal femoral osteotomy.    3. Expansile, predominantly lytic lesion of the posterior medial left  ilium which is minimally enlarged compared to CT 7/6/2018.    GERI LOYOLA MD (Joe)         SYSTEM ID:  [id number]   US Renal Complete Non-Vascular    Narrative    EXAMINATION: US RENAL COMPLETE NON-VASCULAR, 6/23/2023 3:10 PM     COMPARISON: Pelvic CT 7/6/2018    HISTORY: HALEY    TECHNIQUE: The kidneys and bladder were scanned in the standard  fashion with specialized ultrasound transducer(s) using both gray  scale and limited color/spectral Doppler techniques.    FINDINGS:    Right kidney: Measures 11.4 cm in length. No focal mass. No  hydronephrosis.    Left kidney: Measures 11.6 cm in length. No focal mass. No  hydronephrosis. Difficult visualization of the left kidney    Bladder: Partially decompressed. Diffuse wall thickening of the  urinary bladder, likely due to partial decompression.      Impression    IMPRESSION:    1. No hydronephrosis.  2. Apparent urinary bladder wall thickening favor secondary to  decompression.    I have personally reviewed the examination and initial interpretation  and I agree with the findings.    MILIND QUINTERO MD         SYSTEM ID:  [id number]   IR CVC Tunnel Placement > 5 Yrs of Age    Narrative    PROCEDURE: Tunneled central venous catheter placement     Procedural Personnel  Advanced practice provider(s): Sher Modi PA-C    Procedure Date (mm/dd/yyyy): 6/27/2023    Pre-procedure diagnosis: Infection of prosthetic joint, subsequent  encounter  Post-procedure diagnosis: Same  Indication: Other-IV antibiotics  Additional clinical history: Infected left hip requires intravenous  antibiotic course, nephrology deems PICC contraindicated due to  potential use of upper extremities for dialysis fistula in the future.  IR consulted for tunneled single lumen central venous  catheter  placement    Complications: No immediate complications.      Impression    IMPRESSION:    Insertion of right-sided tunneled 5 Egyptian single lumen power line  catheter, with tip in the expected location of the superior vena cava.    Plan:     The catheter may be used immediately.  _______________________________________________________________    PROCEDURE SUMMARY:  - Venous access with ultrasound guidance  - Tunneled central venous catheter insertion with fluoroscopic  guidance  - Additional procedure(s): None    PROCEDURE DETAILS:    Pre-procedure  Consent: Informed consent for the procedure including risks, benefits  and alternatives was obtained and time-out was performed prior to the  procedure.  Preparation (MIPS): The site was prepared and draped using all  elements of maximal sterile barrier technique including sterile  gloves, sterile gown, cap, mask, large sterile sheet, sterile  ultrasound probe cover, hand hygiene and cutaneous antisepsis with 2%  chlorhexidine.   Medical reason for site preparation exception (MIPS): Not applicable    Anesthesia/sedation  Level of anesthesia/sedation: Minimal sedation (anxiolysis), 1 mg  midazolam IV  Anesthesia/sedation administered by: Independent trained observer  under attending supervision with continuous monitoring of the  patient?s level of consciousness and physiologic status  Total intra-service sedation time (minutes): N/a    Access  Local anesthesia was administered. The vessel was sonographically  evaluated and determined to be patent. Real time ultrasound was used  to visualize needle entry into the vessel and a permanent image was  stored.  Laterality: Right  Vein accessed: Internal jugular vein  Access technique: Micropuncture set with 21 gauge needle    Catheter placement  An incision was made near the venous access site and the catheter was  tunneled subcutaneously to the venous access site and trimmed to  appropriate length. The catheter was  advanced via a peel-away sheath  into the vein under fluoroscopic guidance. Catheter tip location was  fluoroscopically verified and a permanent image was stored.  Catheter placed: Bard PowerLine with SureCuff Ingrowth Cuff  Lot number: [id number]  Catheter size (Finnish): 5  Lumens: Single-lumen   Power injectable: Yes  Catheter tip: superior vena cava  Catheter flush: Heparin (10 units/mL)    Closure  A sterile dressing was applied.  Access site closure technique: Tissue adhesive  Catheter securement technique: Non absorbable suture and Stat-Lock    Radiation Dose  Fluoroscopy time (seconds): 4.4    Reference air kerma (mGy): 1.21     Additional Details  Additional description of procedure: None  Device used: None  Equipment details: None  Unique Device Identifiers: Not available  Specimens removed: None  Estimated blood loss (mL): Less than 10  Standardized report: [email or web address]_v3.1    Attestation  Signer name: SCOT DOYLE PA-C  I attest that I was present for the entire procedure. I reviewed the  stored images and agree with the report as written.      SCOT DOYLE PA-C         SYSTEM ID:  [id number]   US Renal Complete Non-Vascular    Narrative    EXAMINATION: US RENAL COMPLETE NON-VASCULAR, 7/1/2023 3:16 PM     COMPARISON: Ultrasound 6/23/2023, CT pelvis 10/25/2022    HISTORY: HALEY    TECHNIQUE: The kidneys and bladder were scanned in the standard  fashion with specialized ultrasound transducer(s) using both gray  scale and limited color/spectral Doppler techniques.    FINDINGS:    Right kidney: Measures 11.0 cm in length. Parenchyma is of normal  thickness and echogenicity. No focal mass. No hydronephrosis.    Left kidney: Measures 10.8 cm in length. Parenchyma is of normal  thickness and echogenicity. No focal mass. No hydronephrosis.     Bladder: Unchanged bladder wall thickening measuring up to 6 mm,  likely in part due to the underdistention of the bladder.      Impression    IMPRESSION:  1.  Normal  kidneys.   2.  Unchanged bladder wall thickening, likely in part due to  underdistention of the bladder. Findings are nonspecific, however, may  represent cystitis given recent history of urinary tract infection.    I have personally reviewed the examination and initial interpretation  and I agree with the findings.    MORRIS PEREZ MD         SYSTEM ID:  [id number]   CT Head w/o Contrast    Narrative    CT HEAD W/O CONTRAST 7/4/2023 8:42 PM    Provided History: neuro changes    Comparison: None.    Technique: Using multidetector thin collimation helical acquisition  technique, axial, coronal and sagittal CT images from the skull base  to the vertex were obtained without intravenous contrast.     Findings:    No intracranial hemorrhage. No mass effect. No midline shift. No  extra-axial fluid collection. The gray to white matter differentiation  of the cerebral hemispheres is preserved. Ventricles are proportionate  to the sulci. Moderate cerebral atrophy. No sulcal effacement. The  basal cisterns are patent. Periventricular white matter hypodensities  are nonspecific but likely represent chronic small vessel ischemic  disease.    Mild scattered mucosal thickening in the paranasal sinuses. The  mastoid air cells are clear. Orbits appear unremarkable. No acute  fracture.      Impression    Impression:   1. No acute intracranial pathology.  2. Periventricular white matter hypodensities which are nonspecific,  but likely representing chronic small vessel ischemic disease.  3. Moderate cerebral atrophy.    I have personally reviewed the examination and initial interpretation  and I agree with the findings.    JOSE COMER MD         SYSTEM ID:  [id number]   XR Pelvis Port 1/2 Views    Narrative    EXAM: XR PELVIS PORT 1/2 VIEWS  LOCATION: [facility]  DATE: 7/23/2023    INDICATION: Pain.  COMPARISON: Pelvis CT 06/19/2023.      Impression    IMPRESSION: Redemonstrated  postoperative changes status post left hip arthroplasty with cerclage wire fixation. Incompletely healed osteotomy vs periprosthetic fracture along the lateral femoral cortex is again seen. No definite new fracture notify with   limitations of osteopenia. Vascular calcifications.   IR CVC Tunnel Placement > 5 Yrs of Age    Narrative    PROCEDURES 8/1/2023:  1. Ultrasound guidance for venous access  2. Placement centrally inserted catheter (age > 5 yrs.) tunneled, no  port, no pump  3. Fluoroscopic guidance placement    Clinical History: Renal failure requiring longer-term hemodialysis.  Double-lumen tunneled central venous catheter placement requested.    Comparisons: 6/27/2023    Staff Radiologist: Keena Lomeli MD.  I, Keena Lomeli,  performed the entire procedure.    Fellow(s)/Resident(s): None    Assistant: None    Medications:   1% lidocaine for local anesthesia  And Ancef 2 g IV for procedure prophylaxis  Fentanyl 50 mcg IV    Nursing:  The patient was placed on continuous monitoring. Intravenous  sedation was administered. Sedation time was 20 minutes. Attending  face-to-face time 20 minutes. Vital signs and sedation monitored by  nursing staff under Interventional Radiologists supervision. The  patient remained stable throughout the procedure.    Fluoroscopy time: 0.6 minutes    PROCEDURE: The patient understood the limitations, alternatives, and  risks of the procedure and requested the procedure be performed. Both  written and oral consent were obtained.    Patient has a right-sided internal jugular tunneled central venous  catheter. Therefore left line placement was pursued.    Limited left internal jugular ultrasound documented jugular vein  patency.    The left neck and upper chest were prepped and draped in the usual  sterile fashion. Ten to one volume mixture of 1% lidocaine without  epinephrine was used for local anesthesia.     Under ultrasound guidance, left internal jugular venotomy was  made  with a micropuncture needle. Permanent copy of the image documenting  the vein was entered into the patients record.    Under fluoroscopic guidance, 0.018 inch wire advanced to upper IVC.  The micropuncture needle was exchanged over guidewire for the  micropuncture sheath. Necessary intravascular catheter length was  measured with a 0.018 guidewire. Bentson wire advanced to IVC and  locked in place.    A 14.5 Vietnamese, 27 cm length tip to cuff glide catheter hemodialysis  capable catheter was subcutaneously tunneled from the left anterior  chest to the left internal jugular venotomy site. Micropuncture sheath  exchanged for the peel-away sheath. The catheter was placed through  the peel-away sheath. The catheter tip was placed at the right atrium  under fluoroscopic guidance. Peel-away sheath removed. Both lumens  aspirated and flushed adequately. Each lumen heparin locked with  1000:1 heparin solution. 3-0 nylon catheter retaining suture and  sterile dressing were applied. Left internal jugular venotomy site  closed with single 4-0 Monocryl subcuticular suture followed by  Dermabond. No immediate complication.      Impression    IMPRESSION:   Placement of left internal jugular 14.5 Vietnamese, 27 cm tip to cuff  glidepath hemodialysis capable catheter with tip positioned at the  right atrium. Catheter is well functioning, heparin locked, and ready  for immediate use.    YOSELYN CROWELL MD         SYSTEM ID:  [id number]   IR CVC Tunnel Removal Left    Narrative    PRE-PROCEDURE DIAGNOSIS: Acute kidney injury    POST-PROCEDURE DIAGNOSIS: Same    PROCEDURE: Removal of tunneled central venous catheter    Impression    IMPRESSION: Completed removal of tunneled central venous catheter.  There were no complications.    ----------    CLINICAL HISTORY: The patient has a tunneled central venous catheter.  Therapy was complete and catheter removal was requested.    PERFORMED BY: Jose Stockton PA-C    MEDICATIONS: 1% lidocaine  without epinephrine was available for local  anesthesia. No intravenous medications for sedation were utilized.    DESCRIPTION: Physical examination demonstrated no erythema,  tenderness, fluctuance, or discharge at the catheter exit site or  along the tract. The catheter and its entry site into the skin was  prepped and draped in usual sterile fashion.     Lidocaine mixture and blunt dissection were used around the catheter  and subcutaneous cuff.      Using steady gentle traction, the catheter and cuff were freed from  the subcutaneous tissue, and the entire catheter was removed without  difficulty. Compression was applied over the venipuncture site, as  well as along the tract, until good hemostasis was achieved. A sterile  dressing was applied to the skin at the exit site.    COMPLICATIONS:  No immediate concerns; the patient remained stable  throughout the procedure and tolerated it well.    ESTIMATED BLOOD LOSS:  Minimal    SPECIMENS:  Catheter removed per above    TIME:  A total of 15 minutes was spent with the patient. Greater than  50% of the time was spent in counseling, education, and coordination  of care.    --------    INSTRUCTIONS GIVEN TO PATIENT:  Keep site clean, dry, and covered for  72 hours.  Change the dressing if it becomes soiled, wet, or blood  soaked.  After 72 hours the dressing may be removed and the site may  be left uncovered and may get wet if the site has sealed.  If the site  has not sealed, keep it covered and dry with daily dressing changes  until sealed.  Monitor the neck over the collar bone for swelling and  the chest exit site for bleeding or infection as instructed.   Telephone Interventional Radiology daytime [phone], or [facility]   after hours [phone] (and ask for IR on-call), if  problems or concerns develop.  If the site bleeds, sit upright and  hold pressure on the site and above the collar bone for 10 minutes.   If it continues to bleed, continue holding  pressure and telephone  Interventional Radiology at the number(s) above.    JUNIOR MOYA PA-C         SYSTEM ID:  [id number]   XR Cervical Spine 2/3 Views    Narrative    Exam: XR CERVICAL SPINE 2/3 VIEWS, 8/10/2023 4:56 PM    History:  Neck pain, post prolonged hospitalization neck pain, post  prolonged hospitalization    Comparison: None    Findings:   AP and lateral views of the cervical spine.  C5, C6, and C7 are  obscured by overlying soft tissue.    Partially visualized straightening of the expected cervical lordosis.  Grade 1 anterolisthesis of C3 on C4. The posterior elements of C3 and  C4 appear ankylosed.  This narrowing at C4-5. Multilevel degenerative  change including anterior vertebral body spurring, endplate  irregularity, and probable facet hypertrophy. Prevertebral soft tissue  within normal limits. Partially visualized perihilar opacities. Right  approach PICC terminates over the SVC.      Impression    Impression:   1.  No acute traumatic fracture or subluxation of the partially  visualized cervical spine.  2.  Moderate cervical spondylosis.  3.  Partially visualized perihilar pulmonary edema and/or atelectasis.    I have personally reviewed the examination and initial interpretation  and I agree with the findings.    ROBBY THACKER MD         SYSTEM ID:  [id number]   XR Calcaneus Left G/E 2 Views    Narrative    EXAM: XR CALCANEUS LEFT G/E 2 VIEWS  LOCATION: [facility]  DATE: 8/10/2023    INDICATION: Left calcaneal and.  COMPARISON: None available.      Impression    IMPRESSION: Wound dressing overlies the lateral aspect of the left heel and calcaneus, limiting osseous detail. There is the appearance of subtle cortical loss at the lateral aspect of the calcaneus, suspicious for underlying erosion in the setting of   acute osteomyelitis. However, this could be artifactual, due to the overlying wound dressing. Correlation with MR imaging is  recommended.    Mild polyarticular osteoarthritis. No radiographic evidence of acute fracture or malalignment. Diffuse vascular calcifications.   CT Head w/o Contrast    Narrative    EXAM: CT HEAD W/O CONTRAST  8/10/2023 5:14 PM     HISTORY:  left lower extremity paresis       COMPARISON:  CT 7/4/2023    TECHNIQUE: Using multidetector thin collimation helical acquisition  technique, axial, coronal and sagittal CT images from the skull base  to the vertex were obtained without intravenous contrast.   (topogram) image(s) also obtained and reviewed.    FINDINGS:  No acute intracranial hemorrhage, mass effect, or midline shift. No  acute loss of gray-white matter differentiation in the cerebral  hemispheres. Ventricles are proportionate to the cerebral sulci, and a  setting of unchanged moderate diffuse cerebral parenchymal volume loss  and substantial periventricular patchy low-attenuation likely  representing changes of chronic small vessel ischemic disease. Clear  basal cisterns.    The bony calvaria and the bones of the skull base are normal. Minimal  scattered paranasal sinus mucosal thickening with clear mastoid air  cells. Grossly normal orbits.       Impression    IMPRESSION:  1. No acute intracranial pathology.  2. Unchanged moderate diffuse cerebral parenchymal volume loss.  3. Stable marked sequela of chronic small vessel ischemic disease.    I have personally reviewed the examination and initial interpretation  and I agree with the findings.    JOSE COMER MD         SYSTEM ID:  [id number]   XR Ankle Left G/E 3 Views    Narrative    EXAM: XR ANKLE LEFT G/E 3 VIEWS  LOCATION: [facility]  DATE: 8/12/2023    INDICATION: Pain  COMPARISON: None.      Impression    IMPRESSION: The left ankle is negative for fracture. Degenerative change at the tibiotalar joint. Achilles calcaneal spurring. Degenerative change at the subtalar joints.   XR Knee Left 1/2 Views     Narrative    EXAM: XR KNEE LEFT 1/2 VIEWS  LOCATION: [facility]  DATE: 8/12/2023    INDICATION: fall out of bed in setting of prior total left hip, increased pain in left hip and knee  COMPARISON: None.      Impression    IMPRESSION: Degenerative change at the medial and patellofemoral compartment. No evidence for fracture. No effusion.   XR Pelvis 1/2 Views    Narrative    EXAM: XR PELVIS 1/2 VIEWS  LOCATION: [facility]  DATE: 8/12/2023    INDICATION: fall out of bed in setting of prior total left hip, increased pain in left hip and knee  COMPARISON: None.      Impression    IMPRESSION: Postoperative changes left total hip arthroplasty and cerclage wire fixation. No evidence for fracture or dislocation. Degenerative change right hip joint. Pelvis negative for fracture. Vascular calcifications.   CT Foot Left w/o Contrast    Narrative    EXAM: CT FOOT LEFT WITHOUT CONTRAST  LOCATION: [facility]  DATE: 08/13/2023    INDICATION: Increased pain, unstageable ulcer, concerns for osteomyelitis based on x-ray.  COMPARISON: 08/12/2013 radiographs.  TECHNIQUE: Noncontrast. Axial, sagittal and coronal thin-section reconstruction. Dose reduction techniques were used.     FINDINGS:     BONES:  -Diffuse osteopenia. No evidence of a fracture. No findings to suggest osteomyelitis. Normal alignment. Advanced degenerative changes at the first MTP joint.    SOFT TISSUES:  -Moderate global atrophy of the intrinsic musculature of the foot. No discrete fluid collection to suggest an abscess.      Impression    IMPRESSION:  1.  No fracture or osteomyelitis.    2.  Marked diffuse osteopenia of the bones of the foot.    3.  Advanced degenerative changes at the first MTP joint.     MR CERVICAL SPINE W/O & W CONTRAST    Narrative    EXAM: MR CERVICAL SPINE W/O & W CONTRAST  8/13/2023 7:57 PM     HISTORY:   spondylosis, worsening pain, no fracture based on xray; Neck  pain; r/o Spinal infection; Neck pain with rest; No  known/automatically detected potential contraindications to imaging       COMPARISON:  Cervical spine radiographs 8/10/2023.    TECHNIQUE: Sagittal T1-weighted and T2-weighted and axial T2-weighted  images of the cervical spine were obtained without intravenous  contrast. Post intravenous contrast using gadolinium axial and  sagittal T1-weighted images were obtained with fat saturation. Some  sequences were repeated due to patient motion during initial image  acquisition.    CONTRAST: 13 mL Gadavist.    FINDINGS:  Motion degraded examination. Normal cervical vertebral alignment.  Degenerative marrow edema in the opposing endplates at C6-7.  Multilevel disc dehydration and loss of intervertebral disc height,  greatest at C5-6 and C6-7. No cord signal abnormality.    No abnormal enhancement of the cervical spinal cord or otherwise  within the spinal canal. No cervical mass lesion demonstrated.    The findings on a level by level basis are as follows:    C2-3: Left facet hypertrophy. No spinal canal or neural foraminal  stenosis.     C3-4: Left greater than right facet hypertrophy. Mild right and  moderate left neural foraminal stenosis. Mild spinal canal narrowing..    C4-5: Left greater than right facet hypertrophy. Moderate left and  mild right neural foraminal narrowing. No spinal canal stenosis.    C5-6: Left greater than right uncinate and facet hypertrophy. Moderate  left and mild right neural foraminal narrowing. Mild spinal canal  narrowing.    C6-7: Disc osteophyte complex and bilateral uncinate hypertrophy. Left  greater than right facet hypertrophy. Mild right and moderate left  neural foraminal stenosis. Moderate spinal canal stenosis.    C7-T1: Left facet hypertrophy. Mild left neural foraminal narrowing.  No right neural foraminal stenosis. No spinal canal stenosis.      Impression     IMPRESSION:  1. Motion degraded examination.  2. Multilevel cervical degenerative changes, greatest at C6-7.  3. No myelopathic cord signal.  4. No fluid collection or abscess.    SERENA DAHL MD         SYSTEM ID:  [id number]       Discharge Medications   Current Discharge Medication List        START taking these medications    Details   amLODIPine (NORVASC) 10 MG tablet Take 1 tablet (10 mg) by mouth daily    Associated Diagnoses: Essential hypertension      carvedilol (COREG) 6.25 MG tablet Take 1 tablet (6.25 mg) by mouth 2 times daily (with meals)    Associated Diagnoses: Essential hypertension      ciprofloxacin (CIPRO) 500 MG tablet Take 1 tablet (500 mg) by mouth every 12 hours for 28 days  Qty: 56 tablet, Refills: 0    Comments: End date is 9/13  Associated Diagnoses: Bacteremia      diclofenac (VOLTAREN) 1 % topical gel Apply 2 g topically 4 times daily    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, unspecified laterality, sequela      doxycycline hyclate (VIBRAMYCIN) 100 MG capsule Take 1 capsule (100 mg) by mouth every 12 hours for 28 days  Qty: 56 capsule, Refills: 0    Comments: End date is 9/13  Associated Diagnoses: Bacteremia      ferrous sulfate (FEROSUL) 325 (65 Fe) MG tablet Take 1 tablet (325 mg) by mouth every other day    Associated Diagnoses: Acute blood loss anemia      folic acid (FOLVITE) 1 MG tablet Take 1 tablet (1 mg) by mouth daily    Associated Diagnoses: Acute blood loss anemia      Lidocaine (LIDOCARE) 4 % Patch Place 1 patch onto the skin every 24 hours To prevent lidocaine toxicity, patient should be patch free for 12 hrs daily.    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, unspecified laterality, sequela      melatonin 3 MG tablet Take 1 tablet (3 mg) by  mouth At Bedtime    Associated Diagnoses: Physical deconditioning      miconazole (MICATIN) 2 % external powder Apply topically 2 times daily    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, unspecified laterality, sequela; Acute blood loss anemia      multivitamin w/minerals (THERA-VIT-M) tablet Take 1 tablet by mouth daily    Associated Diagnoses: Acute blood loss anemia      thiamine (B-1) 100 MG tablet Take 1 tablet (100 mg) by mouth daily    Associated Diagnoses: Acute blood loss anemia      Vitamin D3 (CHOLECALCIFEROL) 25 mcg (1000 units) tablet Take 2 tablets (50 mcg) by mouth daily    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, unspecified laterality, sequela           CONTINUE these medications which have NOT CHANGED    Details   acetaminophen (TYLENOL) 325 MG tablet Take 2 tablets (650 mg) by mouth every 4 hours as needed for other (For optimal non-opioid multimodal pain management to improve pain control.)    Associated Diagnoses: Prosthetic joint infection, initial encounter (H)      atorvastatin (LIPITOR) 40 MG tablet Take 40 mg by mouth daily      docusate sodium (COLACE) 100 MG capsule Take 100 mg by mouth 2 times daily      lactobacillus rhamnosus, GG, (CULTURELL) capsule Take 1 capsule by mouth 2 times daily    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      methocarbamol (ROBAXIN) 500 MG tablet Take 1 tablet (500 mg) by mouth every 6 hours as needed for muscle spasms    Associated Diagnoses: Infection of prosthetic joint, subsequent encounter      polyethylene glycol (MIRALAX) 17 g packet Take 17 g by mouth daily    Associated Diagnoses: Prosthetic joint infection, initial encounter (H)      senna-docusate (SENOKOT-S/PERICOLACE) 8.6-50 MG tablet Take 1 tablet by mouth 2 times daily    Associated Diagnoses: Infection of prosthetic joint, subsequent encounter      traMADol (ULTRAM) 50 MG tablet Take 25-50 mg by mouth every 6 hours as needed for severe pain            STOP taking these medications       apixaban ANTICOAGULANT (ELIQUIS) 5 MG tablet Comments:   Reason for Stopping:         insulin aspart (NOVOLOG PEN) 100 UNIT/ML pen Comments:   Reason for Stopping:         insulin aspart (NOVOLOG PEN) 100 UNIT/ML pen Comments:   Reason for Stopping:         magnesium oxide 400 MG CAPS Comments:   Reason for Stopping:         oxyCODONE (ROXICODONE) 5 MG tablet Comments:   Reason for Stopping:             Allergies   Allergies   Allergen Reactions    Sulfa Antibiotics      Other reaction(s): *Unknown - Childhood Rxn    Tizanidine Other (See Comments)     Frequent urination; causes drowsiness, and dry mouth

## 2023-08-16 NOTE — PROGRESS NOTES
Chart checked Don. Per unit SW note from 8/15, he is planned to discharged to  TCU today, 8/16. WE are not actively managing symptoms and his goals of care per my colleague's note from 8/11 was very clear that Rahat and his family want to pursue restorative measures.     I have placed an outpatient referral to our clinic for continued goals of care conversations if needed.     Thank you for the consult. Palliative will sign-off.     Haris Parnell DO / Internal and Palliative Medicine   Securely message with the Vocera Web Console (learn more here)   Text page via [facility] Paging/Directory

## 2023-08-16 NOTE — PLAN OF CARE
"1900-0730  /62 (BP Location: Left arm)   Pulse 74   Temp 97.5  F (36.4  C) (Oral)   Resp 16   Ht 1.778 m (5' 10\")   Wt 104 kg (229 lb 4.5 oz)   SpO2 98%   BMI 32.90 kg/m       No acute changes.  Pt withdrawn and irritable in the evening, declined most assessments, frustrated re: not getting enough sleep.  AOx3, forgetful.  ORA.  Endorsing pain to LLE and posterior neck, managed with sched meds.  B/L neuropathy BLE.  L heel dressing CDI.  Blanchable redness to bottom and groin.  NOOB, A2 lift and cares.  Renal diet.  Incont B/B at times, using bedside urinal.  R CVC HL.     Plan for TCU, medically ready to discharge.   RN manage K+, Mag WNL.     Pt able to make needs known, call light within reach. BA on, freq rounding.  Care ongoing.   "

## 2023-08-17 NOTE — PROGRESS NOTES
"Brief Medicine Note  Contacted by nursing regarding patient with unwitnessed fall in room. Denied hitting head. Evaluated patient at bedside. He states he more so \"slid\" out of bed. Complains of pain on medial aspect of left knee, no other changes or complaints of new pain elsewhere.  On exam, patient with tenderness on medial aspect of left knee. No obvious deformities noted. Will order left knee x-ray to rule out acute fracture.    Jessica Frausto PA-C  Hospitalist Service    "

## 2023-08-17 NOTE — PLAN OF CARE
"  VS: BP (!) 142/66 (BP Location: Left arm, Patient Position: Semi-Fuentes's, Cuff Size: Adult Large)   Pulse 74   Temp 97.8  F (36.6  C) (Oral)   Resp 18   Ht 1.778 m (5' 10\")   Wt 104 kg (229 lb 4.5 oz)   SpO2 97%   BMI 32.90 kg/m       O2: At room air    Output: Voids using urinal   Last BM: 08/16/23. Passing flatus    Activity: Lift device when OOB. Assist of 2 with turning and repositioning.   Skin: L hip surgical incision. BRII.   Scattered bruising. Redness to buttocks.    Pain: Managed by scheduled tylenol and tramadol.   CMS: Calm and cooperative. Alert to self.   Disoriented to place, time and situation.    Dressing: L heel, CDI.    Diet: Renal diet.    LDA: R chest port a port    Equipment: Urinal, bedpan and personal belongings.    Plan: Pending transfer to TCU.    Additional Info: Patient had unwitnessed fall at around 9:45pm. Patient was seen laying on the floor facing down with pillow on his left arm. vitals was taken and was stable. Denies hitting his head and denies head pain. But complain of left knee pain. Patient reported having old pain on his left leg: hip, ankle, shin,  heel. Pupils round and reactive to light. Able to answer questions. Disoriented on place and time. But oriented to self and nurses around him. When asked what is he trying to do before the fall, he responded: I was trying to get up.   Patient was put back to bed using ceiling lift. MD check patient at bedside and ordered for knee xray. No fracture seen on xray.   Fall compass report submitted.                                  "

## 2023-08-17 NOTE — PLAN OF CARE
"  VS: /69 (BP Location: Left arm)   Pulse 73   Temp 97.7  F (36.5  C) (Oral)   Resp 18   Ht 1.778 m (5' 10\")   Wt 104 kg (229 lb 4.5 oz)   SpO2 90%   BMI 32.90 kg/m       Output: Voiding using urinal. Pt calling appropriately for bed pan and had two Bms today.   Lungs: LS clear. Room air, denies SOB or CP.   Activity: Not OOB. A1-2 with turning and repositioning. Pt encouraged to get OOB and sit in chair this afternoon --  pt said \"yeah I'd like to get out of bed later this afternoon, but not right now.\" RN tried again at 1330 and pt said he wanted to \"be left alone right now.\" Refused repositioning. Bed alarm on for pt safety.   Skin: Erythema on buttocks and bilateral groins, wound on left heel, left hip incision healing.   Pain:   Pain in LLE managed with scheduled tramadol and tylenol.   Neuro/CMS:   A&Ox4. Denies N/T.   Dressing(s):   Mepilex dressing on left heel C/D/I.   Diet:   Renal diet, poor appetite. Only had two orange juices today and refused    LDA:   Right chest port heparin locked.   Equipment:   Bariatric bed, ceiling lift, bed alarm.   Plan:   Discharge to  TCU tomorrow.   Additional Info:   Call light within use, pt able to make needs known.       "

## 2023-08-17 NOTE — PROGRESS NOTES
Care Management Follow Up    Length of Stay (days): 61    Expected Discharge Date: 08/18/23     Concerns to be Addressed: Discharge planning  Patient plan of care discussed at interdisciplinary rounds: Yes    Anticipated Discharge Disposition: Return to Transitional Care    [facility]  [address]  [address]th [place] [address]  Admissions: [phone]  RN Station: [phone]      Anticipated Discharge Services:  (Post acute therapies, wound care)  Anticipated Discharge DME: None    Patient/family educated on Medicare website which has current facility and service quality ratings: yes  Education Provided on the Discharge Plan: Yes  Patient/Family in Agreement with the Plan: yes    Referrals Placed by CM/SW:  ([facility] TCU)  Private pay costs discussed: MA for LTC eligibility and necessity to secure payment for TCU/LTC previously discussed during pt's 05/26/23 - 06/02/23 hospitalization. Pt's Financial POA, Pollo Bustos, has been working on pt's MA for LTC application.      Additional Information:  Per IDT rounds, pt is medically ready to discharge.    SW in communication with [facility] liaison, Linda Walter, who reported  TCU does not have the capacity to take pt today d/t his new fall/impulsivity getting out of bed on his own and falling. [facility] TCU want to make sure pt is good for 24 hrs with no additional falls; will re-look at pt tomorrow.     SW to continue to follow for return to [facility] TCU. New IMM needed upon discharge.        ANTONETTE MinorW, LSW  [facility]   PHONE: [phone]  Pager: [phone]

## 2023-08-17 NOTE — PROGRESS NOTES
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    Plan for today   - Pt had unwitnessed fall last night, hit knee, xray negative   - TCU wants pt monitored today     # Neck pain secondary to multilevel cervical degenerative changes greatest at C6 and C7  # Left foot pain secondary to significant degenerative changes and unstageable left heel ulcer, follow-up.  Patient was ruled out for fracture and osteomyelitis given his intermediate high pretest probability with change of mental status and MRSA bacteremia using advanced imaging with MRI especially in setting of worsening neck and foot pain.  Pain is better controlled with acetaminophen 1 g every 8 hours, diclofenac 2 g 4 times daily, lidocaine patch, Robaxin 500 mg at bedtime, and tramadol at 2.5 mg every 6 hours scheduled with holding parameters.    # MRSA bacteremia due to indwelling drain complicated by left hip septic arthritis s/p debridement and antibiotic and implant retention on 6/21/23, all are POA  The patient's bacteremia has resolved.  Blood cultures: 6/14, 6/15 + for MRSA (4/4), and 6/16 (1/2 GPCs) and 6/17 (2/2) GPCs. - started vancomycin 6/15.   6/17 sinovial fluid cultures with staph and klebsiella. Purulent drainage noted at incision site at TCU, CT left hip concerning for infection.KEILA did not show endocarditis.  The patient received ceftazidime and vancomycin for management of corynebacterium, Pseudomonas, and MRSA during this  hospitalization.  - Continue WBAT LLE, will need imaging including XR hip to be obtained in late August.   - Bracing/Splinting: Abduction pillow or regular pillow while in bed.  - Continue doxycycline and ciprofloxacin through 9/13  -The patient will need infectious disease follow-up on 8/21 that can be done as outpatient  -The patient was seen in consultation with prosthetics and infectious disease    # Hypertension  - Cont PTA amlodipine 10 mg daily  - Initiated on carvedilol 6.25 mg twice daily    # Acute on chronic blood loss anemia   - The patient had three units of blood transfusion  - Continue oral iron, folic acid, multivitamins and thiamine.   - Will obtain labs once every three days to minimize blood loss through phlebotomy.     # Resolved medical problems  Unwitnessed fall with trauma to the left lower extremity overnight 8/12, not present on admission  X-ray without evidence of fracture.  Based on discussion with orthopedics, no role for further work-up    Multifactorial encephalopathy - likely toxic metabolic, uremic, delirium, depression contributing, not clear if present on admission, resolved     Acute Kidney Injury complicated by hypernatremia on top of chronic kidney disease stage I-II, unclear if present on admission  The patient was expected to have dialysis but however he did not require during this hospitalization.  Although line was placed it was removed without complications also during this hospitalization.     Asymptomatic bacteruria (VRE)  UA obtained on 7/19 to assess for casts, reflexed to culture which is growing VRE. Labs and vitals with low concern for new infection. Discussed with ID who recommended against treatment.    Hypoxemic Respiratory Failure due to immobility, surgery, not clear iof present on admission, resolved    Constipation, resolved with the current regimen     Hypomagnesemia  -monitor and replete as indicated    # Chronic medical problems:  L heel pressure wound,  "unstagable  We are following recommendations of wound nurse    Type 2 diabetes mellitus  Being monitored off insulin     Severe malnutrition in the context of acute on chronic illness  Following dietitian recommendations     Adjustment disorder with depressed mood and prolonged grief disorder  Patient is declined psychotropic medications    Hyperlipidemia-continue PTA Lipitor 40 mg daily    JAIR-CPAP when sleeping       Diet: Renal Diet (non-dialysis)  Diet    DVT Prophylaxis: DOAC  Tran Catheter: Not present  Lines: PRESENT      CVC Single Lumen Right Internal jugular Non - valved (open ended);Tunneled;Power injectable-Site Assessment: WDL        Cardiac Monitoring: None  Code Status: Full Code      Clinically Significant Risk Factors              # Hypoalbuminemia: Lowest albumin = 2.2 g/dL at 7/8/2023  8:51 AM, will monitor as appropriate            # Obesity: Estimated body mass index is 32.9 kg/m  as calculated from the following:    Height as of this encounter: 1.778 m (5' 10\").    Weight as of this encounter: 104 kg (229 lb 4.5 oz).     # Severe Malnutrition: based on nutrition assessment             Disposition Plan      Expected Discharge Date: 08/18/2023, 12:00 PM      Discharge Comments: Should be medically ready within the upcoming two days, then will be pending TCU placement        Cornelio Ho MD  Hospitalist Service, GOLD TEAM 21  [facility]  Securely message with [employer] (more info)  Text page via [employer] Paging/Directory   See signed in provider for up to date coverage information  ______________________________________________________________________    Interval History   Patient could not go to TCU yesterday due to staffing shortage, had unwitnessed fall and apparently hit his knee, xray negative. This AM he denied knee pain, does report some abdominal discomfort    Physical Exam   Vital Signs: Temp: 97.7  F (36.5  C) Temp src: Oral BP: 138/69 Pulse: " 73   Resp: 18 SpO2: 90 % O2 Device: None (Room air)    Weight: 229 lbs 4.45 oz  Constitutional: Awake, alert, cooperative, no apparent distress  Respiratory: Clear to auscultation bilaterally, no crackles or wheezing  Cardiovascular: Regular rate and rhythm  GI: Normal bowel sounds, soft, non-distended, non-tender  Skin/Integumen: No rashes, no cyanosis       Medical Decision Making     Seen on 8/16/2023 and billed as subsequent visit as patient did not discharge    45 MINUTES SPENT BY ME on the date of service doing chart review, history, exam, documentation & further activities per the note.      Data     I have personally reviewed the following data over the past 24 hrs:    N/A  \   N/A   / N/A     N/A N/A N/A /  94   4.2 N/A 2.31 (H) \       Imaging results reviewed over the past 24 hrs:   Recent Results (from the past 24 hour(s))   XR Knee Left 1/2 Views    Narrative    EXAM: XR KNEE LEFT 1/2 VIEWS  LOCATION: [facility]  DATE: 8/17/2023    INDICATION: Patient with fall injury of left knee rule out fracture. Pain.  COMPARISON: 08/12/2023      Impression    IMPRESSION: Degenerative change and chondrocalcinosis. No visible fracture or dislocation.

## 2023-08-17 NOTE — PROGRESS NOTES
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    # Neck pain secondary to multilevel cervical degenerative changes greatest at C6 and C7  # Left foot pain secondary to significant degenerative changes and unstageable left heel ulcer, follow-up.  Patient was ruled out for fracture and osteomyelitis given his intermediate high pretest probability with change of mental status and MRSA bacteremia using advanced imaging with MRI especially in setting of worsening neck and foot pain.  Pain is better controlled with acetaminophen 1 g every 8 hours, diclofenac 2 g 4 times daily, lidocaine patch, Robaxin 500 mg at bedtime, and tramadol at 2.5 mg every 6 hours scheduled with holding parameters.    # MRSA bacteremia due to indwelling drain complicated by left hip septic arthritis s/p debridement and antibiotic and implant retention on 6/21/23, all are POA  The patient's bacteremia has resolved.  Blood cultures: 6/14, 6/15 + for MRSA (4/4), and 6/16 (1/2 GPCs) and 6/17 (2/2) GPCs. - started vancomycin 6/15.   6/17 sinovial fluid cultures with staph and klebsiella. Purulent drainage noted at incision site at TCU, CT left hip concerning for infection.KEILA did not show endocarditis.  The patient received ceftazidime and vancomycin for management of corynebacterium, Pseudomonas, and MRSA during this hospitalization.  - Continue WBAT LLE, will need imaging including XR hip to be obtained in late August.   - Bracing/Splinting:  Abduction pillow or regular pillow while in bed.  - Continue doxycycline and ciprofloxacin through 9/13  -The patient will need infectious disease follow-up on 8/21 that can be done as outpatient  -The patient was seen in consultation with prosthetics and infectious disease    # Hypertension  - Cont PTA amlodipine 10 mg daily  - Initiated on carvedilol 6.25 mg twice daily    5. Acute blood loss anemia and anemia of critical illness on top of anemia of chronic disease, not clear if present admission  - The patient had three units of blood transfusion  - Continue oral iron, folic acid, multivitamins and thiamine.   - Will obtain labs once every three days to minimize blood loss through phlebotomy.     6. Resolved medical problems  Unwitnessed fall with trauma to the left lower extremity overnight 8/12, not present on admission  X-ray without evidence of fracture.  Based on discussion with orthopedics, no role for further work-up    Multifactorial encephalopathy - likely toxic metabolic, uremic, delirium, depression contributing, not clear if present on admission, resolved     Acute Kidney Injury complicated by hypernatremia on top of chronic kidney disease stage I-II, unclear if present on admission  The patient was expected to have dialysis but however he did not require during this hospitalization.  Although line was placed it was removed without complications also during this hospitalization.     Asymptomatic bacteruria (VRE)  UA obtained on 7/19 to assess for casts, reflexed to culture which is growing VRE. Labs and vitals with low concern for new infection. Discussed with ID who recommended against treatment.    Hypoxemic Respiratory Failure due to immobility, surgery, not clear iof present on admission, resolved    Constipation, resolved with the current regimen     Hypomagnesemia  -monitor and replete as indicated    7. Chronic medical problems:  L heel pressure wound, unstagable  We are following  "recommendations of wound nurse    Type 2 diabetes mellitus  Being monitored off insulin     Severe malnutrition in the context of acute on chronic illness  Following dietitian recommendations     Adjustment disorder with depressed mood and prolonged grief disorder  Patient is declined psychotropic medications    Hyperlipidemia-continue PTA Lipitor 40 mg daily    JAIR-CPAP when sleeping       Diet: Renal Diet (non-dialysis)  Diet    DVT Prophylaxis: DOAC  Tran Catheter: Not present  Lines: PRESENT      CVC Single Lumen Right Internal jugular Non - valved (open ended);Tunneled;Power injectable-Site Assessment: WDL        Cardiac Monitoring: None  Code Status: Full Code      Clinically Significant Risk Factors              # Hypoalbuminemia: Lowest albumin = 2.2 g/dL at 7/8/2023  8:51 AM, will monitor as appropriate            # Obesity: Estimated body mass index is 32.9 kg/m  as calculated from the following:    Height as of this encounter: 1.778 m (5' 10\").    Weight as of this encounter: 104 kg (229 lb 4.5 oz).     # Severe Malnutrition: based on nutrition assessment             Disposition Plan      Expected Discharge Date: 08/18/2023, 12:00 PM      Discharge Comments: Should be medically ready within the upcoming two days, then will be pending TCU placement Pending care conference as patient states desire for rehab but as of yet has not sufficiently participated in therapy to qualify.        Cornelio Ho MD  Hospitalist Service, GOLD TEAM 21  [facility]  Securely message with [employer] (more info)  Text page via [facility] Paging/Directory   See signed in provider for up to date coverage information  ______________________________________________________________________    Interval History   No acute events overnight, was planned to discharge today but staffing at TCU not available, likely discharge tomorrow     Physical Exam   Vital Signs: Temp: 97.7  F (36.5  C) Temp " src: Oral BP: 138/69 Pulse: 73   Resp: 18 SpO2: 90 % O2 Device: None (Room air)    Weight: 229 lbs 4.45 oz  Constitutional: Awake, alert, cooperative, no apparent distress  Respiratory: Clear to auscultation bilaterally, no crackles or wheezing  Cardiovascular: Regular rate and rhythm  GI: Normal bowel sounds, soft, non-distended, non-tender  Skin/Integumen: No rashes, no cyanosis       Medical Decision Making     Seen on 8/16/2023 and billed as subsequent visit as patient did not discharge    55 MINUTES SPENT BY ME on the date of service doing chart review, history, exam, documentation & further activities per the note.      Data     I have personally reviewed the following data over the past 24 hrs:    N/A  \   N/A   / N/A     N/A N/A N/A /  94   4.2 N/A 2.31 (H) \       Imaging results reviewed over the past 24 hrs:   Recent Results (from the past 24 hour(s))   XR Knee Left 1/2 Views    Narrative    EXAM: XR KNEE LEFT 1/2 VIEWS  LOCATION: [facility]  DATE: 8/17/2023    INDICATION: Patient with fall injury of left knee rule out fracture. Pain.  COMPARISON: 08/12/2023      Impression    IMPRESSION: Degenerative change and chondrocalcinosis. No visible fracture or dislocation.

## 2023-08-18 NOTE — PLAN OF CARE
"  VS: /55 (BP Location: Left arm)   Pulse 73   Temp 98.1  F (36.7  C) (Axillary)   Resp 16   Ht 1.778 m (5' 10\")   Wt 104 kg (229 lb 4.5 oz)   SpO2 99%   BMI 32.90 kg/m       O2: Stable at room air.   Output: Incontinent of bowel and bladder  Voids using urinal.   Linen changed. Perineal and buttocks clean at 06:00am.    Last BM: 08/18/23 smear of stool this AM    Activity: Patient not oob. Assist of 2 with turning and repositioning.   Patient irritated when doing turnings.     Skin: L hip surgical incision. BRII.   Scattered bruising. Blanchable redness to buttocks.    Pain: Managed by scheduled tylenol and tramadol.     CMS: A&O x2, disoriented to time and situation. Intermittent disorientation to place.    Dressing: L heel dressing, CDI   Diet: Renal diet   LDA: R chest port a port      Equipment: Urinal, bedpan and personal belongings.    Plan: Patient had a fall night of 8/16. Patient was monitored for 24hrs. Possible discharge to TCU 08/18.   Additional Info: Contact Isolations maintained.                          "

## 2023-08-18 NOTE — PROGRESS NOTES
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    Plan for today   - Waiting for TCU bed  - Continue current care    # Neck pain secondary to multilevel cervical degenerative changes greatest at C6 and C7  # Left foot pain secondary to significant degenerative changes and unstageable left heel ulcer, follow-up.  Patient was ruled out for fracture and osteomyelitis given his intermediate high pretest probability with change of mental status and MRSA bacteremia using advanced imaging with MRI especially in setting of worsening neck and foot pain.  Pain is better controlled with acetaminophen 1 g every 8 hours, diclofenac 2 g 4 times daily, lidocaine patch, Robaxin 500 mg at bedtime, and tramadol at 2.5 mg every 6 hours scheduled with holding parameters.    # MRSA bacteremia due to indwelling drain complicated by left hip septic arthritis s/p debridement and antibiotic and implant retention on 6/21/23, all are POA  The patient's bacteremia has resolved.  Blood cultures: 6/14, 6/15 + for MRSA (4/4), and 6/16 (1/2 GPCs) and 6/17 (2/2) GPCs. - started vancomycin 6/15.   6/17 sinovial fluid cultures with staph and klebsiella. Purulent drainage noted at incision site at TCU, CT left hip concerning for infection.KEILA did not show endocarditis.  The patient received ceftazidime and vancomycin for management of corynebacterium, Pseudomonas, and MRSA during this hospitalization.  - Continue WBAT LLE, will need imaging  including XR hip to be obtained in late August.   - Bracing/Splinting: Abduction pillow or regular pillow while in bed.  - Continue doxycycline and ciprofloxacin through 9/13  -The patient will need infectious disease follow-up on 8/21 that can be done as outpatient  -The patient was seen in consultation with prosthetics and infectious disease    # Hypertension  - Cont PTA amlodipine 10 mg daily  - Initiated on carvedilol 6.25 mg twice daily    # Acute on chronic blood loss anemia   - The patient had three units of blood transfusion  - Continue oral iron, folic acid, multivitamins and thiamine.   - Will obtain labs once every three days to minimize blood loss through phlebotomy.     # Resolved medical problems  Unwitnessed fall with trauma to the left lower extremity overnight 8/12, not present on admission  X-ray without evidence of fracture.  Based on discussion with orthopedics, no role for further work-up    Multifactorial encephalopathy - likely toxic metabolic, uremic, delirium, depression contributing, not clear if present on admission, resolved     Acute Kidney Injury complicated by hypernatremia on top of chronic kidney disease stage I-II, unclear if present on admission  The patient was expected to have dialysis but however he did not require during this hospitalization.  Although line was placed it was removed without complications also during this hospitalization.     Asymptomatic bacteruria (VRE)  UA obtained on 7/19 to assess for casts, reflexed to culture which is growing VRE. Labs and vitals with low concern for new infection. Discussed with ID who recommended against treatment.    Hypoxemic Respiratory Failure due to immobility, surgery, not clear iof present on admission, resolved    Constipation, resolved with the current regimen     Hypomagnesemia  -monitor and replete as indicated    # Chronic medical problems:  L heel pressure wound, unstagable  We are following recommendations of wound  "nurse    Type 2 diabetes mellitus  Being monitored off insulin     Severe malnutrition in the context of acute on chronic illness  Following dietitian recommendations     Adjustment disorder with depressed mood and prolonged grief disorder  Patient is declined psychotropic medications    Hyperlipidemia-continue PTA Lipitor 40 mg daily    JAIR-CPAP when sleeping       Diet: Renal Diet (non-dialysis)  Diet    DVT Prophylaxis: DOAC  Tran Catheter: Not present  Lines: PRESENT      CVC Single Lumen Right Internal jugular Non - valved (open ended);Tunneled;Power injectable-Site Assessment: WDL        Cardiac Monitoring: None  Code Status: Full Code      Clinically Significant Risk Factors            # Hypomagnesemia: Lowest Mg = 1.4 mg/dL in last 2 days, will replace as needed   # Hypoalbuminemia: Lowest albumin = 2.2 g/dL at 7/8/2023  8:51 AM, will monitor as appropriate            # Obesity: Estimated body mass index is 32.9 kg/m  as calculated from the following:    Height as of this encounter: 1.778 m (5' 10\").    Weight as of this encounter: 104 kg (229 lb 4.5 oz).     # Severe Malnutrition: based on nutrition assessment             Disposition Plan     Expected Discharge Date: 08/18/2023, 12:00 PM      Discharge Comments: Should be medically ready within the upcoming two days, then will be pending TCU placement        Cornelio Ho MD  Hospitalist Service, [facility] 21  [facility]  Securely message with [employer] (more info)  Text page via [facility] Paging/Directory   See signed in provider for up to date coverage information  ______________________________________________________________________    Interval History   Patient could not go to TCU yesterday due to staffing shortage, had unwitnessed fall and apparently hit his knee, xray negative. This AM he denied knee pain, does report some abdominal discomfort    Physical Exam   Vital Signs: Temp: 97.8  F (36.6  C) Temp src: " Oral BP: 132/58 Pulse: 66   Resp: 16 SpO2: 98 % O2 Device: None (Room air)    Weight: 229 lbs 4.45 oz  Constitutional: Awake, alert, no apparent distress  Respiratory: Clear to auscultation bilaterally, no crackles or wheezing  Cardiovascular: Regular rate and rhythm  GI: Normal bowel sounds, soft, non-distended, non-tender  Skin/Integumen: No rashes, no cyanosis       Medical Decision Making     Seen on 8/16/2023 and billed as subsequent visit as patient did not discharge    45 MINUTES SPENT BY ME on the date of service doing chart review, history, exam, documentation & further activities per the note.      Data     I have personally reviewed the following data over the past 24 hrs:    7.9  \   7.5 (L)   / 295     140 106 31.8 (H) /  86   4.9 25 2.22 (H) \     ALT: 14 AST: 18 AP: 76 TBILI: 0.3   ALB: 3.2 (L) TOT PROTEIN: 6.4 LIPASE: N/A       Imaging results reviewed over the past 24 hrs:   No results found for this or any previous visit (from the past 24 hour(s)).

## 2023-08-18 NOTE — PLAN OF CARE
"  VS: /58 (BP Location: Left arm)   Pulse 66   Temp 97.8  F (36.6  C) (Oral)   Resp 16   Ht 1.778 m (5' 10\")   Wt 104 kg (229 lb 4.5 oz)   SpO2 98%   BMI 32.90 kg/m       Output: Voiding using urinal. Pt calling appropriately for bed pan and had one large BM today.   Lungs: LS clear. Room air, denies SOB or CP.   Activity: Not OOB. Activity encouraged but pt refused to get up to chair today. A1-2 with turning and repositioning. Bed alarm on for pt safety.   Skin: Erythema and rash on buttocks and bilateral groins, wound on left heel, left hip incision BRII healing.   Pain:   Pain in LLE managed with scheduled tramadol and tylenol.   Neuro/CMS:   A&Ox4. Denies N/T.   Dressing(s):   Dressing changes on left heel and right chest CVC completed today.   Diet:   Renal diet, poor appetite.   LDA:   Right CVC heparin locked.   Equipment:   Bariatric bed, low air loss mattress, ceiling lift, bed alarm.   Plan:   Discharge to [facility], awaiting bed.   Additional Info:   Magnesium replaced today. Pt's brother is currently visiting. Call light within reach, pt able to make needs known.       "

## 2023-08-18 NOTE — PLAN OF CARE
"  VS: Temp: 96.8  F (36  C) Temp src: Axillary BP: (!) 140/65 Pulse: 70   Resp: 16 SpO2: 99 % O2 Device: None (Room air)       O2: Pt.'s O2 sats above 90 on room air, denies shortness of breath and chest pain   Output: Pt. Is voiding adequate amounts without difficulty in bedside urinal   Last BM: 08/17/2023   Activity: Pt. Not oob this shift. When asked about getting up in the chair pt. Stated \"maybe later\", when asked later pt. Was \"too tired\". Turned side to side in bed for skin check with assist of 1   Skin: Blanchable redness on sacrum/buttocks. Erythema in bilateral groin, pt. Refused miconazole powder application. L heel wound, L hip incision. Scattered bruises and scrapes on bilateral arms, legs.    Pain: LLE, Managed with scheduled Tylenol and Tramadol   CMS: A&O x2, disoriented to time and situation. Denies numbness and tingling   Dressing: Bilateral heels with mepilex, both CDI   Diet: Regular diet, poor appetite. Refused food this shift.   LDA: R chest port, patent and heparin locked. L heel wound, L hip incision.    Equipment: Bariatric bed, ceiling lift, bed alarm   Plan: Discharge to TCU tomorrow   Additional Info:                            "

## 2023-08-19 NOTE — PROGRESS NOTES
"VS: /64   Pulse 69   Temp 97.9  F (36.6  C) (Oral)   Resp 18   Ht 1.778 m (5' 10\")   Wt 104 kg (229 lb 4.5 oz)   SpO2 95%   BMI 32.90 kg/m      O2: RA   Output:  voids spontanously w/urinal   Last BM: 8/18   Activity: Bedfast, A2-3- ceiling lift   Skin: Grossly intact. right hip incision healed, sacral redness   Pain: Scheduled tylenol and tramadol administered   CMS: A&Ox4, occasional confusion, uses call light appropriately, able to make needs known   Dressing: Mepilex dressing to left heel. DEEP Supposed wound on bottom due to unwilling to roll, went thru chart and dc'ed many other wounds/suspected wounds documented in chart had apparently healed or were not present upon comprehensive anterior skin assessment   Diet: Renal diet, needs some nudging to eat   LDA: Rt. chest Non-valved PICC   Equipment: Personal belongings in the room   Plan: Awaiting TCU placement   Additional Info: Expressed frustration being woken up for medications before 10am.    Per SW note: [facility] does not have the capacity to take pt 8/17 d/t his new fall/impulsivity getting out of bed on his own and falling. [facility] want to make sure pt is good for 24 hrs with no additional falls; will re-look at pt 8/18.        "

## 2023-08-19 NOTE — PROGRESS NOTES
VS: Temp: 98.7  F (37.1  C) Temp src: Oral BP: (!) 117/39 Pulse: 68   Resp: 16 SpO2: 98 % O2 Device: None (Room air)       O2: On room air, denies SOB, no cough noted   Output: Voids spontaneously in urinal, can be incontinent   Last BM: 8/18   Activity: Bedfast, assist of 2-3 with ceiling lift   Skin: Left hip healed incision, redness to buttock   Pain: Managed by tylenol and scheduled tylenol   CMS: Alert with intermittent confusion   Dressing: Mepilex to left heel   Diet: Renal diet, patient needs encouragement to eat   LDA: Non valved PICC to Right chest   Equipment: Belongings   Plan: Awaiting bed availability at a TCU   Additional Info:

## 2023-08-19 NOTE — PROGRESS NOTES
VS: Temp: 98.6  F (37.  C) Temp src: Oral BP: 130/65 Pulse: 94   Resp: 16 SpO2: 98% on RA   O2: RA   Output:  voids spontanously w/urinal   Last BM: 8/18   Activity: Bedfast,A2-3- ceiling lift   Skin: Grossly intact. rt. heal hip incision, sacral redness   Pain: Scheduled tynelol and tramadol administered   CMS: A&Ox4, ocaasional confusion, uses call light appropriately, able to make needs known   Dressing: Mepilex dressing to left heel   Diet: Renal diet, needs some nudging to eat   LDA: Rt. chest Non-valved PICC   Equipment: Personal belongings in the room   Plan: Awaiting TCU placement   Additional Info:

## 2023-08-19 NOTE — PROGRESS NOTES
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    Plan for today   - Waiting for TCU bed  - Continue current care    # Neck pain secondary to multilevel cervical degenerative changes greatest at C6 and C7  # Left foot pain secondary to significant degenerative changes and unstageable left heel ulcer, follow-up.  Patient was ruled out for fracture and osteomyelitis given his intermediate high pretest probability with change of mental status and MRSA bacteremia using advanced imaging with MRI especially in setting of worsening neck and foot pain.  Pain is better controlled with acetaminophen 1 g every 8 hours, diclofenac 2 g 4 times daily, lidocaine patch, Robaxin 500 mg at bedtime, and tramadol at 2.5 mg every 6 hours scheduled with holding parameters.    # MRSA bacteremia due to indwelling drain complicated by left hip septic arthritis s/p debridement and antibiotic and implant retention on 6/21/23, all are POA  The patient's bacteremia has resolved.  Blood cultures: 6/14, 6/15 + for MRSA (4/4), and 6/16 (1/2 GPCs) and 6/17 (2/2) GPCs. - started vancomycin 6/15.   6/17 sinovial fluid cultures with staph and klebsiella. Purulent drainage noted at incision site at TCU, CT left hip concerning for infection.KEILA did not show endocarditis.  The patient received ceftazidime and vancomycin for management of corynebacterium, Pseudomonas, and MRSA during this hospitalization.  - Continue WBAT LLE, will need imaging  including XR hip to be obtained in late August.   - Bracing/Splinting: Abduction pillow or regular pillow while in bed.  - Continue doxycycline and ciprofloxacin through 9/13  -The patient will need infectious disease follow-up on 8/21 that can be done as outpatient  -The patient was seen in consultation with prosthetics and infectious disease    # Hypertension  - Cont PTA amlodipine 10 mg daily  - Initiated on carvedilol 6.25 mg twice daily    # Acute on chronic blood loss anemia   - The patient had three units of blood transfusion  - Continue oral iron, folic acid, multivitamins and thiamine.   - Will obtain labs once every three days to minimize blood loss through phlebotomy.     # Resolved medical problems  Unwitnessed fall with trauma to the left lower extremity overnight 8/12, not present on admission  X-ray without evidence of fracture.  Based on discussion with orthopedics, no role for further work-up    Multifactorial encephalopathy - likely toxic metabolic, uremic, delirium, depression contributing, not clear if present on admission, resolved     Acute Kidney Injury complicated by hypernatremia on top of chronic kidney disease stage I-II, unclear if present on admission  The patient was expected to have dialysis but however he did not require during this hospitalization.  Although line was placed it was removed without complications also during this hospitalization.     Asymptomatic bacteruria (VRE)  UA obtained on 7/19 to assess for casts, reflexed to culture which is growing VRE. Labs and vitals with low concern for new infection. Discussed with ID who recommended against treatment.    Hypoxemic Respiratory Failure due to immobility, surgery, not clear iof present on admission, resolved    Constipation, resolved with the current regimen     Hypomagnesemia  -monitor and replete as indicated    # Chronic medical problems:  L heel pressure wound, unstagable  We are following recommendations of wound  "nurse    Type 2 diabetes mellitus  Being monitored off insulin     Severe malnutrition in the context of acute on chronic illness  Following dietitian recommendations     Adjustment disorder with depressed mood and prolonged grief disorder  Patient is declined psychotropic medications    Hyperlipidemia-continue PTA Lipitor 40 mg daily    JAIR-CPAP when sleeping       Diet: Renal Diet (non-dialysis)  Diet    DVT Prophylaxis: DOAC  Tran Catheter: Not present  Lines: PRESENT      CVC Single Lumen Right Internal jugular Non - valved (open ended);Tunneled;Power injectable-Site Assessment: WDL        Cardiac Monitoring: None  Code Status: Full Code      Clinically Significant Risk Factors            # Hypomagnesemia: Lowest Mg = 1.4 mg/dL in last 2 days, will replace as needed   # Hypoalbuminemia: Lowest albumin = 2.2 g/dL at 7/8/2023  8:51 AM, will monitor as appropriate            # Obesity: Estimated body mass index is 32.9 kg/m  as calculated from the following:    Height as of this encounter: 1.778 m (5' 10\").    Weight as of this encounter: 104 kg (229 lb 4.5 oz).     # Severe Malnutrition: based on nutrition assessment             Disposition Plan           Cornelio Ho MD  Hospitalist Service, GOLD TEAM 21  [facility]  Securely message with [employer] (more info)  Text page via [facility] Paging/Directory   See signed in provider for up to date coverage information  ______________________________________________________________________    Interval History   No acute events overnight, states he is feels fine    Physical Exam   Vital Signs: Temp: 97.9  F (36.6  C) Temp src: Oral BP: 138/64 Pulse: 69   Resp: 18 SpO2: 95 % O2 Device: None (Room air)    Weight: 229 lbs 4.45 oz  Constitutional: Awake, alert, no apparent distress  Respiratory: Clear to auscultation bilaterally, no crackles or wheezing  Cardiovascular: Regular rate and rhythm  GI: Normal bowel sounds, soft, " non-distended, non-tender  Skin/Integumen: No rashes, no cyanosis       Medical Decision Making     Seen on 8/16/2023 and billed as subsequent visit as patient did not discharge    45 MINUTES SPENT BY ME on the date of service doing chart review, history, exam, documentation & further activities per the note.      Data         Imaging results reviewed over the past 24 hrs:   No results found for this or any previous visit (from the past 24 hour(s)).

## 2023-08-20 NOTE — PROGRESS NOTES
Patient sleeping this AM and refuses morning medications. MD notified. Attempt to administer X 3.  Maria Alejandra Garcia RN

## 2023-08-20 NOTE — PLAN OF CARE
"  VS: /65 (BP Location: Left arm)   Pulse 76   Temp 98.9  F (37.2  C) (Oral)   Resp 18   Ht 1.778 m (5' 10\")   Wt 104 kg (229 lb 4.5 oz)   SpO2 96%   BMI 32.90 kg/m       O2: Room air   Output: Mostly continent periods of incontinence   Last BM: 8/18/23   Activity: In bed   Skin: Patient declined skin assessment this shift   Pain: LLE pain, scheduled pain medication   CMS: Intact   Dressing: CDI   Diet: Regular, inadequate PO intake today. Encouraged meals   LDA: Right central line   Equipment:    Plan: Pt to discharge to TCU   Additional Info: Patient was sleeping late this morning and refused  most cares and all medications. Reported to MD. Antibiotics given later per MD, patient agreeable.                              "

## 2023-08-20 NOTE — PLAN OF CARE
Goal Outcome Evaluation:                        VS: VSS and afebrile   O2: Stable on RA   Output: Adequate output in urinal    Last BM: 8/18 per report   Activity: Not OOB.  Refused repositioning   Skin: Refused skin assessment.  Visible skin:CDI    Pain: L leg pain- Managed with schedule tramadol, tylenol and Robaxin    Neuro: Pt has been alert and oriented x4.   Dressing: CVC: CDI. Dressing changed this morning   Diet: Renal diet.   Refused to eat dinner.    LDA: CVC to chest    Equipment: Personal belongings at bedside   Plan: Per SW notes: Pt was accepted to  [facility] - waiting for insurance authorization. Possible discharge 8/21   Additional Info: Pt refused cares. Education given yet pt continuous to refused cares.

## 2023-08-20 NOTE — PLAN OF CARE
VS: Temp: 97.9  F (36.6  C) Temp src: Oral BP: 138/64 Pulse: 69   Resp: 18 SpO2: 95 % O2 Device: None (Room air)       O2: Pt.'s O2 sats above 90 on room air, denies shortness of breath and chest pain   Output: Voiding independently and without difficulty in bedside urinal   Last BM: 8/18/2023   Activity: Bedfast, assist of 2 with ceiling lift. Able to turn side to side with assist of 1   Skin: L heel wound with mepilex, blanchable redness on sacrum. Pt. Did  not allow assessment on groin area. Scattered scrapes/scratches/bruises on bilateral arms and legs, overall pale and jonathan color.    Pain: Managed with scheduled Tylenol and tramadol, usually 7/10   CMS: A&O x3, somewhat disoriented to time   Dressing: L heel mepilex CDI, R chest picc with transparent dressing CDI   Diet: Renal diet, poor appetite.    LDA: R chest picc heparin locked, L heel wound with mepilex    Equipment: IV pole, personal belongings, bariatric bed, lift   Plan: Awaiting TCU placement   Additional Info:

## 2023-08-20 NOTE — PROGRESS NOTES
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    Plan for today   - Waiting for TCU bed  - Continue current care    # Neck pain secondary to multilevel cervical degenerative changes greatest at C6 and C7  # Left foot pain secondary to significant degenerative changes and unstageable left heel ulcer, follow-up.  Patient was ruled out for fracture and osteomyelitis given his intermediate high pretest probability with change of mental status and MRSA bacteremia using advanced imaging with MRI especially in setting of worsening neck and foot pain.  Pain is better controlled with acetaminophen 1 g every 8 hours, diclofenac 2 g 4 times daily, lidocaine patch, Robaxin 500 mg at bedtime, and tramadol at 2.5 mg every 6 hours scheduled with holding parameters.    # MRSA bacteremia due to indwelling drain complicated by left hip septic arthritis s/p debridement and antibiotic and implant retention on 6/21/23, all are POA  The patient's bacteremia has resolved.  Blood cultures: 6/14, 6/15 + for MRSA (4/4), and 6/16 (1/2 GPCs) and 6/17 (2/2) GPCs. - started vancomycin 6/15.   6/17 sinovial fluid cultures with staph and klebsiella. Purulent drainage noted at incision site at TCU, CT left hip concerning for infection.KEILA did not show endocarditis.  The patient received ceftazidime and vancomycin for management of corynebacterium, Pseudomonas, and MRSA during this hospitalization.  - Continue WBAT LLE, will need imaging  including XR hip to be obtained in late August.   - Bracing/Splinting: Abduction pillow or regular pillow while in bed.  - Continue doxycycline and ciprofloxacin through 9/13  -The patient will need infectious disease follow-up on 8/21 that can be done as outpatient  -The patient was seen in consultation with prosthetics and infectious disease    # Hypertension  - Cont PTA amlodipine 10 mg daily  - Initiated on carvedilol 6.25 mg twice daily    # Acute on chronic blood loss anemia   - The patient had three units of blood transfusion  - Continue oral iron, folic acid, multivitamins and thiamine.   - Will obtain labs once every three days to minimize blood loss through phlebotomy.     # Resolved medical problems  Unwitnessed fall with trauma to the left lower extremity overnight 8/12, not present on admission  X-ray without evidence of fracture.  Based on discussion with orthopedics, no role for further work-up    Multifactorial encephalopathy - likely toxic metabolic, uremic, delirium, depression contributing, not clear if present on admission, resolved     Acute Kidney Injury complicated by hypernatremia on top of chronic kidney disease stage I-II, unclear if present on admission  The patient was expected to have dialysis but however he did not require during this hospitalization.  Although line was placed it was removed without complications also during this hospitalization.     Asymptomatic bacteruria (VRE)  UA obtained on 7/19 to assess for casts, reflexed to culture which is growing VRE. Labs and vitals with low concern for new infection. Discussed with ID who recommended against treatment.    Hypoxemic Respiratory Failure due to immobility, surgery, not clear iof present on admission, resolved    Constipation, resolved with the current regimen     Hypomagnesemia  -monitor and replete as indicated    # Chronic medical problems:  L heel pressure wound, unstagable  We are following recommendations of wound  "nurse    Type 2 diabetes mellitus  Being monitored off insulin     Severe malnutrition in the context of acute on chronic illness  Following dietitian recommendations     Adjustment disorder with depressed mood and prolonged grief disorder  Patient is declined psychotropic medications    Hyperlipidemia-continue PTA Lipitor 40 mg daily    JAIR-CPAP when sleeping       Diet: Renal Diet (non-dialysis)  Diet    DVT Prophylaxis: DOAC  Tran Catheter: Not present  Lines: PRESENT      CVC Single Lumen Right Internal jugular Non - valved (open ended);Tunneled;Power injectable-Site Assessment: WDL        Cardiac Monitoring: None  Code Status: Full Code      Clinically Significant Risk Factors            # Hypomagnesemia: Lowest Mg = 1.6 mg/dL in last 2 days, will replace as needed   # Hypoalbuminemia: Lowest albumin = 2.2 g/dL at 7/8/2023  8:51 AM, will monitor as appropriate            # Obesity: Estimated body mass index is 32.9 kg/m  as calculated from the following:    Height as of this encounter: 1.778 m (5' 10\").    Weight as of this encounter: 104 kg (229 lb 4.5 oz).     # Severe Malnutrition: based on nutrition assessment             Disposition Plan           Cornelio Ho MD  Hospitalist Service, GOLD TEAM 21  [facility]  Securely message with [employer] (more info)  Text page via [facility] Paging/Directory   See signed in provider for up to date coverage information  ______________________________________________________________________    Interval History   No acute events overnight, states he is feels fine    Physical Exam   Vital Signs: Temp: 98.9  F (37.2  C) Temp src: Oral BP: 139/65 Pulse: 76   Resp: 18 SpO2: 96 % O2 Device: None (Room air)    Weight: 229 lbs 4.45 oz  Constitutional: Awake, alert, no apparent distress  Respiratory: Clear to auscultation bilaterally, no crackles or wheezing  Cardiovascular: Regular rate and rhythm  GI: Normal bowel sounds, soft, " non-distended, non-tender  Skin/Integumen: No rashes, no cyanosis       Medical Decision Making     Seen on 8/16/2023 and billed as subsequent visit as patient did not discharge    45 MINUTES SPENT BY ME on the date of service doing chart review, history, exam, documentation & further activities per the note.      Data     I have personally reviewed the following data over the past 24 hrs:    N/A  \   N/A   / N/A     N/A N/A N/A /  85   4.3 N/A N/A \       Imaging results reviewed over the past 24 hrs:   No results found for this or any previous visit (from the past 24 hour(s)).

## 2023-08-20 NOTE — PROGRESS NOTES
"Care Management Follow Up     Length of Stay (days): 64     Expected Discharge Date: 23    Concerns to be Addressed: Discharge planning  Patient plan of care discussed at interdisciplinary rounds: Yes     Anticipated Discharge Disposition: Return to Transitional Care  Anticipated Discharge Services: (Post acute therapies, wound care)  Anticipated Discharge DME: None     Patient/family educated on Medicare website which has current facility and service quality ratings: Yes  Education Provided on the Discharge Plan: This SW did not speak with patient today   Patient/Family in Agreement with the Plan: yes     Referrals Placed by CM/SW: See below  Private pay costs discussed: Did not discuss anything with patient at this time      Additional Information:  Patient is medically ready to discharge per IDT rounds. SW followed up with Linda at [facility] who stated, \"His insurance auth . I submitted for new auth on Friday and am still waiting to hear\". Unable to discharge today pending insurance auth goes through. Care management will continue to follow.     Accepted:  [address]  [id number]  P: [phone]  F: [phone]    LIMA Beltran, LGSW  5 Med Surg and 10 ICU   [facility]  Phone: [phone]  Pager: [phone]    "

## 2023-08-20 NOTE — PLAN OF CARE
Goal Outcome Evaluation:       VS: VSS   O2: SpO2 > 90% and stable on RA. LS clear and equal bilaterally. Denies chest pain and SOB.    Output: Voids using beside urinal    Last BM: 8/18/23   Activity: Not oob. Bedfast, assist of 2 with ceiling lift    Skin: L heel wound with mepilex, blanchable redness on sacrum. Scattered scrapes/scratches/bruises on bilateral arms and legs    Pain: Pain managed with scheduled tramadol and tylenol    CMS: A&O x3, somewhat disoriented to time    Dressing: L heel mepilex CDI, R chest picc with transparent dressing CDI    Diet: Regular diet.   BG overnight 85   LDA: R chest CVC heparin locked    Equipment: IV pole, personal belongings,    Plan: Contact precautions maintained / Continue with plan of care.    Additional Info: Awaiting TCU placement

## 2023-08-21 NOTE — PLAN OF CARE
Goal Outcome Evaluation:           VS: VSS   O2: SpO2 > 90% and stable on RA. LS clear and equal bilaterally. Denies chest pain and SOB.    Output: Voids using beside urinal    Last BM: 8/18/23   Activity: Not oob. Bedfast, assist of 2 with ceiling lift    Skin: L heel wound with mepilex, blanchable redness on sacrum. Scattered scrapes/scratches/bruises on bilateral arms and legs    Pain: Pain managed with scheduled tramadol and tylenol    CMS: A&O x4, Can be confused at times    Dressing: L heel mepilex CDI, R chest picc with transparent dressing CDI    Diet: Regular diet.    LDA: R chest CVC heparin locked    Equipment: IV pole, personal belongings,    Plan: Contact precautions maintained / Continue with plan of care.    Additional Info: Per [facility] notes: Pt was accepted to [facility] - waiting for insurance authorization. Possible discharge 8/21     Pt refused most cares. Education given yet pt continuous to refused cares.

## 2023-08-21 NOTE — PROGRESS NOTES
"  VS: /71   Pulse 70   Temp 97.3  F (36.3  C)   Resp 16   Ht 1.778 m (5' 10\")   Wt 104 kg (229 lb 4.5 oz)   SpO2 96%   BMI 32.90 kg/m      O2: Room air saturations 96%.    Output: Using urinal to void mecca urine in 200 ml amounts.    Last BM: 8/20/2023 reports patient x 2   Activity: Pt preferred to stay in bed and lay on back.Pt was reluctant to move side to side or get up in chair.    Skin:    Pain: Pt is using Voltaren topical for left heel pain and lidocaine patch for left heel pain. Pt prefers to position left heel with pillow under left leg and heel never hitting onto bed.    CMS:    Dressing:    Diet: Regular. Pt however did not order any food entire shift and said\" I just don't feel like eating. That is how I always am. \"    LDA: Central line to right chest wall that flushes easily however unable to obtain blood return.    Equipment: Specialty bed, isolation room, recliner chair in room. Lift room.    Plan: Awaiting for placement and bed availability   Additional Info:       "

## 2023-08-21 NOTE — PROGRESS NOTES
CLINICAL NUTRITION SERVICES - REASSESSMENT NOTE     Nutrition Prescription    RECOMMENDATIONS FOR MDs/PROVIDERS TO ORDER:  Provider- Please consider liberalizing diet given pt with poor intakes and not ordering over last week.     RN - Please continue to order meals for pt, even if pt does not want food. Please let pt know what is ordered and change if necessary.     Malnutrition Status:    Severe malnutrition in the context of acute on chronic illness    Recommendations already ordered by Registered Dietitian (RD):  Encouraged oral intakes   Discussed pt concerns with kitchen    Future/Additional Recommendations:  Monitor labs, intakes, and weight trends.     EVALUATION OF THE PROGRESS TOWARD GOALS   Diet: Renal  Intake/Tolernace: Poorly documented    Pt ordered 3 meals over last week.     NEW FINDINGS/REVIEW OF SYSTEMS    Nutrition/GI: RD met with pt at bedside. Pt reports having order from [employer] every so often. Today he tried to order two cereals with two milks but was told he could not given his renal diet. Thus, pt ordered two cereals and the RN got him milk. Pt frustrated that just when he got his meal, they were going to move him. RN states pt is unlikely Pt reports his bowels are moving okay, had loose stools this morning when he fell. Pt states his ankle hurts from the fall.      Weights: Pt with previous 81 lb (25%) weight loss in 6 months, now with 53 lb (22%) weight gain in last two months. Unsure of accuracy of weights during admission to hospital. May be fluid related shifts vs bed scale inaccuracy   08/14/23 2211 104 kg (229 lb 4.5 oz)       08/04/23 0900 134.4 kg (296 lb 4.8 oz) --   07/26/23 1230 134.4 kg (296 lb 6.4 oz) --   07/11/23 1035 132.2 kg (291 lb 6.4 oz) Bed scale   06/28/23 2100 126 kg (277 lb 12.5 oz) Bed scale     06/16/23 115.2 kg (254 lb)   05/26/23 110.4 kg (243 lb 4.8 oz)   05/19/23 110.4 kg (243 lb 4.8 oz)   03/23/23 117 kg (257 lb 14.4 oz)   01/09/23 119.7 kg (264 lb)    11/22/22 147.1 kg (324 lb 4.8 oz)   11/14/22 142.9 kg (315 lb)   11/09/22 146.8 kg (323 lb 9.6 oz)   05/19/22 136.1 kg (300 lb)   07/03/18 148.9 kg (328 lb 4.8 oz)     Skin: buttocks/groin and heel pressure wounds     Labs reviewed   08/14/23 08:32 08/15/23 08:56 08/17/23 07:52 08/18/23 08:15 08/18/23 18:57 08/20/23 11:20 08/21/23  10:43   Sodium 142 139  140   140   Potassium 4.1 4.0 4.2 4.9  4.3 4.2   Urea Nitrogen 38.7 (H) 38.4 (H)  31.8 (H)   30.1 (H)   Creatinine 2.48 (H) 2.34 (H) 2.31 (H) 2.22 (H)   2.13 (H)   Magnesium 1.3 (L) 2.5 (H) 2.1 1.4 (L) 1.9 1.6 (L) 1.4 (L)   Phosphorus 4.4 4.3  3.9   3.9   Albumin 3.3 (L) 3.1 (L)  3.2 (L)   3.2 (L)   Glucose 87 86  86   83      06/26/23 05:13 07/17/23 08:41 08/12/23 12:17   Ferritin 2,616 (H)     Folate   21.9   Iron 30 (L)  38 (L)   Iron Binding Capacity 146 (L)  149 (L)   Iron Sat Index 21  26   Vitamin B12 601  521   Vitamin D  16 (L)      Medications reviewed: Cipro, ferrous sulfate, folic acid, culturell, mag sulfate, multivitamin, thiamine, vitamin D3    MALNUTRITION  % Intake: </=75% for >/= 1 month (severe)  % Weight Loss: Difficult to assess  Subcutaneous Fat Loss: Upper arm: Moderate  Muscle Loss: Temporal:  Moderate and Thoracic region (clavicle, acromium bone, deltoid, trapezius, pectoral):  Moderate-severe  Fluid Accumulation/Edema: None noted  Malnutrition Diagnosis: Severe malnutrition in the context of acute on chronic illness    Previous Goals   Patient to consume % of nutritionally adequate meal trays TID, or the equivalent with supplements/snacks.  Evaluation: Not met    Previous Nutrition Diagnosis  Inadequate oral intake related to poor appetite, menu fatigue, altered mental status as evidenced by pt and RN report and documented intakes.   Evaluation: No change    CURRENT NUTRITION DIAGNOSIS  Inadequate oral intake related to poor appetite, menu fatigue, altered mental status as evidenced by pt and RN report and documented  intakes.    INTERVENTIONS  Implementation  Nutrition education for nutrition relationship to health/disease     Goals  Patient to consume % of nutritionally adequate meal trays TID, or the equivalent with supplements/snacks.    Monitoring/Evaluation  Progress toward goals will be monitored and evaluated per protocol.    Dee Alanis MS, RDN, LD  RD pager: [phone]  WB Weekend/Holiday Pager: [phone]

## 2023-08-21 NOTE — PROGRESS NOTES
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    Plan for today   - Waiting for TCU bed  - Continue current care  - Will replace magnesium     # Neck pain secondary to multilevel cervical degenerative changes greatest at C6 and C7  # Left foot pain secondary to significant degenerative changes and unstageable left heel ulcer, follow-up.  Patient was ruled out for fracture and osteomyelitis given his intermediate high pretest probability with change of mental status and MRSA bacteremia using advanced imaging with MRI especially in setting of worsening neck and foot pain.  Pain is better controlled with acetaminophen 1 g every 8 hours, diclofenac 2 g 4 times daily, lidocaine patch, Robaxin 500 mg at bedtime, and tramadol at 2.5 mg every 6 hours scheduled with holding parameters.    # MRSA bacteremia due to indwelling drain complicated by left hip septic arthritis s/p debridement and antibiotic and implant retention on 6/21/23, all are POA  The patient's bacteremia has resolved.  Blood cultures: 6/14, 6/15 + for MRSA (4/4), and 6/16 (1/2 GPCs) and 6/17 (2/2) GPCs. - started vancomycin 6/15.   6/17 sinovial fluid cultures with staph and klebsiella. Purulent drainage noted at incision site at TCU, CT left hip concerning for infection.KEILA did not show endocarditis.  The patient received ceftazidime and vancomycin for management of corynebacterium, Pseudomonas, and MRSA during this hospitalization.  - Continue WBAT  LLE, will need imaging including XR hip to be obtained in late August.   - Bracing/Splinting: Abduction pillow or regular pillow while in bed.  - Continue doxycycline and ciprofloxacin through 9/13  -The patient will need infectious disease follow-up on 8/21 that can be done as outpatient  -The patient was seen in consultation with prosthetics and infectious disease    # Hypertension  - Cont PTA amlodipine 10 mg daily  - Initiated on carvedilol 6.25 mg twice daily    # Acute on chronic blood loss anemia   - The patient had three units of blood transfusion  - Continue oral iron, folic acid, multivitamins and thiamine.   - Will obtain labs once every three days to minimize blood loss through phlebotomy.     # Resolved medical problems  Unwitnessed fall with trauma to the left lower extremity overnight 8/12, not present on admission  X-ray without evidence of fracture.  Based on discussion with orthopedics, no role for further work-up    Multifactorial encephalopathy - likely toxic metabolic, uremic, delirium, depression contributing, not clear if present on admission, resolved     Acute Kidney Injury complicated by hypernatremia on top of chronic kidney disease stage I-II, unclear if present on admission  The patient was expected to have dialysis but however he did not require during this hospitalization.  Although line was placed it was removed without complications also during this hospitalization.     Asymptomatic bacteruria (VRE)  UA obtained on 7/19 to assess for casts, reflexed to culture which is growing VRE. Labs and vitals with low concern for new infection. Discussed with ID who recommended against treatment.    Hypoxemic Respiratory Failure due to immobility, surgery, not clear iof present on admission, resolved    Constipation, resolved with the current regimen     Hypomagnesemia  -monitor and replete as indicated    # Chronic medical problems:  L heel pressure wound, unstagable  We are following  "recommendations of wound nurse    Type 2 diabetes mellitus  Being monitored off insulin     Severe malnutrition in the context of acute on chronic illness  Following dietitian recommendations     Adjustment disorder with depressed mood and prolonged grief disorder  Patient is declined psychotropic medications    Hyperlipidemia-continue PTA Lipitor 40 mg daily    JAIR-CPAP when sleeping       Diet: Renal Diet (non-dialysis)  Diet    DVT Prophylaxis: DOAC  Tran Catheter: Not present  Lines: PRESENT      CVC Single Lumen Right Internal jugular Non - valved (open ended);Tunneled;Power injectable-Site Assessment: WDL        Cardiac Monitoring: None  Code Status: Full Code      Clinically Significant Risk Factors            # Hypomagnesemia: Lowest Mg = 1.6 mg/dL in last 2 days, will replace as needed   # Hypoalbuminemia: Lowest albumin = 2.2 g/dL at 7/8/2023  8:51 AM, will monitor as appropriate            # Obesity: Estimated body mass index is 32.9 kg/m  as calculated from the following:    Height as of this encounter: 1.778 m (5' 10\").    Weight as of this encounter: 104 kg (229 lb 4.5 oz).     # Severe Malnutrition: based on nutrition assessment             Disposition Plan      Expected Discharge Date: 08/21/2023,  3:00 PM      Discharge Comments: Should be medically ready within the upcoming two days, then will be pending TCU placement        Cornelio Ho MD  Hospitalist Service, GOLD TEAM 21  [facility]  Securely message with [employer] (more info)  Text page via [facility] Paging/Directory   See signed in provider for up to date coverage information  ______________________________________________________________________    Interval History   No acute events overnight    Physical Exam   Vital Signs: Temp: 97.3  F (36.3  C) Temp src: Oral BP: 128/71 Pulse: 70   Resp: 16 SpO2: 96 % O2 Device: None (Room air)    Weight: 229 lbs 4.45 oz  Constitutional: Awake, alert, no " apparent distress  Respiratory: Clear to auscultation bilaterally, no crackles or wheezing  Cardiovascular: Regular rate and rhythm  GI: Normal bowel sounds, soft, non-distended, non-tender  Skin/Integumen: No rashes, no cyanosis       Medical Decision Making     Seen on 8/16/2023 and billed as subsequent visit as patient did not discharge    45 MINUTES SPENT BY ME on the date of service doing chart review, history, exam, documentation & further activities per the note.      Data     I have personally reviewed the following data over the past 24 hrs:    N/A  \   N/A   / N/A     N/A N/A N/A /  N/A   4.3 N/A N/A \       Imaging results reviewed over the past 24 hrs:   No results found for this or any previous visit (from the past 24 hour(s)).

## 2023-08-22 NOTE — PROGRESS NOTES
[facility] Nurse Inpatient Assessment     Consulted for: Left heel    7/25/2023: Attempted to see previously for buttocks. Patient continues to flatly refuse assessment for buttocks today. Per bedside RN, buttocks is red but blanchable, patient continues to be incontinent of urine and stool.       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  Postop course complicated by profound deconditioning.  He was admitted to TCU initially on 12/23/2022 for physical rehabilitation, however therapies no longer worked with patient after several months of an in bed therapy and his refusals to mobilize.  Underwent biopsy on 4/7/2023 without evidence of infection.  He was transferred back to [facility] for left total hip arthroplasty and reimplantation on 5/26/23 with Dr. Meyers.  Presented again to [place] for persistent MRSA bacteremia on 6/17.     Assessment:      Areas visualized during today's visit: Lower extremities     Pressure Injury Location: Left heel    Last photo: 8/22  Wound type: Pressure Injury     Pressure Injury Stage: Unstageable, hospital acquired, assessed with SANDRA Cook on 7/28  Wound history/plan of care:  Pt known to [facility] service. Pt known to decline cares and repositioning.   7/19 initial assessment: Spent approx 20 mins on negotiating how to move leg to be able to minimally assess wound. Pt agreed to see picture of wound. This writer explained wound to pt using the photo. Discussed with patient if he would like legs elevated on pillows or boots. He agreed to pillows.    7/20: Assessed wound with  Kristen Hadley RN CWOCN. Confirmed thick slough/eschar wound base. Again discussed keeping heels off bed with pt.  7/25/2023: Of note, patient has refused and continues to refuse heel off-loading boots on assessment today.  7/28: pt had heel lift boots in place  Wound base: 100 % slough/eschar     Palpation of the wound bed: boggy      Drainage: scant     Description of drainage: serosanguinous     Measurements (length x width x depth, in cm) 3 x 3 x 0.2 cm     Tunneling N/A     Undermining N/A  Periwound skin: Intact      Color: normal and consistent with surrounding tissue      Temperature: normal   Odor: none  Pain: severe, shooting and stabbing  Pain intervention prior to dressing change: slow and gentle cares   Treatment goal: Infection control/prevention and soften nonviable tissue  STATUS: evolving  Supplies ordered: discussed with RN and discussed with patient     Wound location: left buttock and gluteal cleft    Last photo: 8/22  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)  Wound history/plan of care: pt is incontinent and has previously refused assessment of area by [facility]  Wound base: 100 % epidermis with scratched from fingernails, not open      Palpation of the wound bed: normal      Drainage: none     Description of drainage: none     Measurements (length x width x depth, in cm): see photo      Tunneling: N/A     Undermining: N/A  Periwound skin: Intact and Erythema- blanchable      Color: red      Temperature: normal   Odor: none  Pain: moderate, sharp- mostly related to left hip pain  Pain interventions prior to dressing change: slow and gentle cares   Treatment goal: Maintain (prevention of deterioration) and Protection  STATUS: improving  Supplies ordered: at bedside, discussed with RN, and discussed with patient        Treatment Plan:     Bilateral buttocks and gluteal cleft wounds: BID and PRN with incontinence episodes  Cleanse wound with dry wipes and martell cleanse and protect spray.   Apply  light layer of Triad paste (#967104) to wounds and red skin on bilateral buttocks  With repeat application, do not scrub the paste, only remove soiled paste and reapply.  If complete removal of paste is necessary use baby oil/mineral oil (#861066) and soft wash cloth.  Ensure pt has Isaac-cushion (#365129) while sitting up in the chair.  Use only one Covidien pad in between mattress and pt. No brief in bed.    Left heel wound(s): Every other day   Strongly encourage pt to elevate heels on pillows to float off bed surface at all times. (He agreed to this with WOC)  Cleanse wound with wound cleanser.   Apply light layer of Triad paste (#040748) to wound bed  Cut piece of adaptic to cover paste and wound  Secure with mepilex.  If patient agrees to heel off-loading boots, please apply.    Orders: Reviewed    RECOMMEND PRIMARY TEAM ORDER: None, at this time  Education provided: importance of repositioning, plan of care, wound progress and Infection prevention   Discussed plan of care with: Patient and Nurse  WOC nurse follow-up plan: weekly, stable POC  Notify WOC if wound(s) deteriorate.  Nursing to notify the Provider(s) and re-consult the WOC Nurse if new skin concern.    DATA:     Current support surface: Bariatric Low air loss (BIN pump, Isolibrium, Pulsate, skin guard, etc)  Containment of urine/stool: Incontinence Protocol  BMI: Body mass index is 32.9 kg/m .   Active diet order: Orders Placed This Encounter      Renal Diet (non-dialysis)      Diet     Output: I/O last 3 completed shifts:  In: 240 [P.O.:240]  Out: 200 [Urine:200]     Labs:   Recent Labs   Lab 08/21/23  1043   ALBUMIN 3.4*   HGB 7.7*   WBC 7.5       Pressure injury risk assessment:   Sensory Perception: 3-->slightly limited  Moisture: 3-->occasionally moist  Activity: 2-->chairfast  Mobility: 2-->very limited  Nutrition: 3-->adequate  Friction and Shear: 2-->potential problem  Rafael Score: 15    Mona Horton RN CWOCN  Pager no longer is  use, please contact through [employer] group: [facility] Nurse [facility]  Dept. Office Number: [phone]

## 2023-08-22 NOTE — PROGRESS NOTES
Care Management Follow Up    Length of Stay (days): 66    Expected Discharge Date: TBD     Concerns to be Addressed: Discharge planning  Patient plan of care discussed at interdisciplinary rounds: Yes    Anticipated Discharge Disposition: Return to Transitional Care    [facility]  [address]  [address] [place] [address]  Admissions: [phone]  RN Station: [phone]      Anticipated Discharge Services:  (Post acute therapies, wound care)  Anticipated Discharge DME: None    Patient/family educated on Medicare website which has current facility and service quality ratings: yes  Education Provided on the Discharge Plan: Yes  Patient/Family in Agreement with the Plan: yes    Referrals Placed by CM/SW:  ([facility])  Private pay costs discussed: MA for LTC eligibility and necessity to secure payment for TCU/LTC previously discussed during pt's 05/26/23 - 06/02/23 hospitalization. Pt's Financial POA, Pollo Bustos, has been working on pt's MA for LTC application.       Additional Information:  Pt continues to be medically ready to discharge.    SW in communication with [facility] Liaison, Leah Diaz, who stated that, d/t [facility]CU's current high-acuity, they are not able to safely manage pt's level of care at this time.    SW to continue to follow for return to [facility]. New IMM needed upon discharge. Updated PAS completed on 08/15/23; SW to update SLL of discharge date when it occurs.        VANESA Minor, LSW  [facility]   PHONE: [phone]  Pager: [phone]

## 2023-08-22 NOTE — PLAN OF CARE
"[id number]  /48   Pulse 76   Temp 97.5  F (36.4  C) (Oral)   Resp 16   Ht 1.778 m (5' 10\")   Wt 104 kg (229 lb 4.5 oz)   SpO2 95%   BMI 32.90 kg/m       No acute changes this shift.  AOx3.  ORA.  Endorsing generalized pain to posterior neck and LLE managed with sched meds.  AquaK heat pad applied to neck.  A2 lift.  Declining offered repositioning, educated refusals.  Renal diet, tolerating.  Can be incont B/B, using bedside urinal.  L heel dressing changed.    R CVC HL- see prev note.      Plan: TCU, pending placement.   Additional: pt refused AM Tylenol and Tramadol dose, even though pain was rated 9/10; states he is \"overloaded\"    Pt able to make needs known, call light within reach.  BA on, freq rounding.  Cont precaut maintained.  Care ongoing.     "

## 2023-08-22 NOTE — PROGRESS NOTES
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 Meyers and found to have MRSA bacteremia now on prolonged oral antibiotic therapy to complete treatment course. Stable and awaiting discharge to TCU at this time.    Plan for today   - Waiting for TCU bed  - Continue current care   - Troubleshoot access (central line w/o blood return for lab draws), will start with CXR To confirm appropriate placement    Unwitnessed fall  First episode occurred overnight 8/12 and patient had an injury to the left lower extremity. Xrs were without e/o fracture. On 8/22, developed urgency for a bowel movement, was sitting up at side of bed and slid to the floor - reported pain in his left and right knees from hitting the ground, but no pain or discomfort in the left hip. Exam of left hip limited due to pain but no e/o trauma, left hip incision appears c/d/I. Neuro exam unremarkable. Right lower extremity exam unremarkable.  - monitor for changing symptoms, need for imaging, none at this time  - mental status stable, neuro exam non-focal, no need for imaging  - fall precautions    # MRSA bacteremia due to indwelling drain complicated by left hip septic arthritis s/p debridement and antibiotic and implant retention on 6/21/23, all are POA  The patient's bacteremia has resolved.  Blood cultures: 6/14, 6/15 + for MRSA (4/4), and 6/16 (1/2 GPCs) and 6/17 (2/2) GPCs. - started vancomycin 6/15.   6/17 sinovial fluid cultures with staph and klebsiella. Purulent  drainage noted at incision site at TCU, CT left hip concerning for infection.KEILA did not show endocarditis.  The patient received ceftazidime and vancomycin for management of corynebacterium, Pseudomonas, and MRSA during this hospitalization.  - Continue WBAT LLE, will need imaging including XR hip at some point this week   - Posterior hip precautions x3 months (no flexion beyond 90 degrees, adduction beyond midline, internal rotation beyond midline)  - Antimicrobial therapy:   - Ceftazidime (6/20-8/1)   - Vancomycin (6/20-8/1)   - Ciprofloxacin (8/2-)   - Doxycycline (8/2-)   - Continue PO abx through 9/13 to complete total 12 week course  -The patient will need infectious disease follow-up on 8/21 per Dr. Mercer' last note, will re-engage team on 8/22  -The patient was seen in consultation with prosthetics and infectious disease    # Neck pain secondary to multilevel cervical degenerative changes greatest at C6 and C7  # Left foot pain secondary to significant degenerative changes and unstageable left heel ulcer, follow-up.  Patient was ruled out for fracture and osteomyelitis given his intermediate high pretest probability with change of mental status and MRSA bacteremia using advanced imaging with MRI especially in setting of worsening neck and foot pain.    - Acetaminophen 1 g every 8 hours, diclofenac 2 g 4 times daily, lidocaine patch, Robaxin 500 mg at bedtime, and tramadol at 2.5 mg every 6 hours scheduled with holding parameters.    # Hypertension  - Cont PTA amlodipine 10 mg daily  - Initiated on carvedilol 6.25 mg twice daily    # Acute on chronic blood loss anemia   - The patient had three units of blood transfusion  - Continue oral iron, folic acid, multivitamins and thiamine.   - Will obtain labs once every three days to minimize blood loss through phlebotomy.       Multifactorial encephalopathy - likely toxic metabolic, uremic, delirium, depression contributing, not clear if present on admission,  "resolved     Acute Kidney Injury complicated by hypernatremia on top of chronic kidney disease stage I-II, unclear if present on admission  The patient was expected to have dialysis but however he did not require during this hospitalization.  Although line was placed it was removed without complications also during this hospitalization.     Asymptomatic bacteruria (VRE)  UA obtained on 7/19 to assess for casts, reflexed to culture which is growing VRE. Labs and vitals with low concern for new infection. Discussed with ID who recommended against treatment.    Hypoxemic Respiratory Failure due to immobility, surgery, not clear iof present on admission, resolved    Constipation, resolved with the current regimen     Hypomagnesemia  -monitor and replete as indicated    # Chronic medical problems:  L heel pressure wound, unstagable  We are following recommendations of wound nurse    Type 2 diabetes mellitus  Being monitored off insulin     Severe malnutrition in the context of acute on chronic illness  Following dietitian recommendations     Adjustment disorder with depressed mood and prolonged grief disorder  Patient is declined psychotropic medications    Hyperlipidemia-continue PTA Lipitor 40 mg daily    JAIR-CPAP when sleeping       Diet: Renal Diet (non-dialysis)  Diet    DVT Prophylaxis: Pneumatic Compression Devices  Tran Catheter: Not present  Lines: PRESENT      CVC Single Lumen Right Internal jugular Non - valved (open ended);Tunneled;Power injectable-Site Assessment: WDL      Cardiac Monitoring: None  Code Status: Full Code      Clinically Significant Risk Factors            # Hypomagnesemia: Lowest Mg = 1.4 mg/dL in last 2 days, will replace as needed   # Hypoalbuminemia: Lowest albumin = 2.2 g/dL at 7/8/2023  8:51 AM, will monitor as appropriate            # Obesity: Estimated body mass index is 32.9 kg/m  as calculated from the following:    Height as of this encounter: 1.778 m (5' 10\").    Weight as of " this encounter: 104 kg (229 lb 4.5 oz).   # Severe Malnutrition: based on nutrition assessment           Disposition Plan           Naveed Cobb MD  Hospitalist Service, GOLD TEAM 21  [facility]  Securely message with [employer] (more info)  Text page via [employer] Paging/Directory   See signed in provider for up to date coverage information  ______________________________________________________________________    Interval History   Frustrated today. About his hospital course to date, his infections, the lack of improvement he has seen overall, wonders if it is all worth it. We did discuss goals of care briefly, but he is clear his current goals are restorative. Overall seems to be very sad w/ flat affect, not particularly motivated to engage with therapies or even troubleshoot PICC access, though he notes he is a  very difficult peripheral draw.    Physical Exam   Vital Signs: Temp: 97.5  F (36.4  C) Temp src: Oral BP: 128/48 Pulse: 76   Resp: 16 SpO2: 95 % O2 Device: None (Room air)    Weight: 229 lbs 4.45 oz    General Appearance: Appears older than stated age, flat affect  Respiratory: Breathing comfortably on room air, lungs grossly CTAB  Cardiovascular: RRR, no m/r/g.  GI: soft, non-distended, non-tender to palpation, BS+  Skin: pallor present, no skin rash  Other: LLE w/ limited ROM due to pain, prior infection. Trace LE edema bilaterally     Medical Decision Making       45 MINUTES SPENT BY ME on the date of service doing chart review, history, exam, documentation & further activities per the note.      Data     I have personally reviewed the following data over the past 24 hrs:    N/A  \   N/A   / N/A     N/A N/A N/A /  119 (H)   N/A N/A N/A \     ALT: N/A AST: N/A AP: N/A TBILI: N/A   ALB: N/A TOT PROTEIN: N/A LIPASE: N/A       Imaging results reviewed over the past 24 hrs:   No results found for this or any previous visit (from the past 24 hour(s)).

## 2023-08-22 NOTE — PROGRESS NOTES
R CVC flushing well, however no blood return.  Page sent to PEDS VASC.  Advised to complete XR confirming placement if line is necessary.  Page sent to Xcover.     Response: defer to day team since we are not planning to use line overnight.  Will pass on to oncoming AM RN.

## 2023-08-22 NOTE — PLAN OF CARE
"  VS: /57 (BP Location: Left arm, Patient Position: Semi-Fuentes's, Cuff Size: Adult Regular)   Pulse 72   Temp 97.6  F (36.4  C) (Oral)   Resp 18   Ht 1.778 m (5' 10\")   Wt 104 kg (229 lb 4.5 oz)   SpO2 95%   BMI 32.90 kg/m       O2: Room air   Output: Incontinent of bowel, urinal for void   Last BM: 8/22/2023   Activity: bed   Skin: Left heel wound, coccyx/buttock wound   Pain: Denies most times, LLE pain   CMS: intact   Dressing: LLE heel, changed by WOC nurse today   Diet: regular   LDA: R chest central line   Equipment: lift   Plan: Transfer to TCU   Additional Info: Patient alert, confused at times. Taking most medications as directed.                              "

## 2023-08-22 NOTE — PROGRESS NOTES
Patient has right chest CVC with withdrawal occlusion.  Unable to obtain blood return with flushing or repositioning.  Patient unsure when withdrawal occlusion started.    Message sent to MD requesting Alteplase to clear withdrawal occlusion.

## 2023-08-22 NOTE — PROGRESS NOTES
Post Fall Assessment Note    S: Patient had a(n): unwitnessed, unassisted fall.    B: Patient's orientation/mental status at time of fall:  awake, alert, and oriented.   Medications administered within 8 hours of fall:    PCA/Opiates:  No    Hypnotics:  No    Psychotropics: No    Antihypertensives:  Yes    Sedatives:  No   Location of fall:  Pt room 552    A: Type of injury from fall:  None visual, scratches to buttock, possibly from patient.   Patient status:  Stable   Patient was lifted from fall event:  Staff members assisted and Equipment used to assist   Interventions:  Patient to move rooms closer to nurses station, bed alarm on.   MD notified:  Yest   Family member/next of kin notified:  No    R: Falls assessment completed in Epic:  Yes   Care Plan updated:  Yes

## 2023-08-22 NOTE — PROGRESS NOTES
Brief Crosscover Note    Paged by RN regarding central line without blood return. Per her discussion with Peds Vascular access, they recommended obtaining an XR to assess placement if line is still necessary. Appears via chart review that patient has completed antibiotics and is no longer receiving medications through the line, but it is being used for lab draws and the patient dislikes the idea of peripheral draws per RN staff.    At this time, no immediate need for line access overnight. Will leave in place for day team to assess for removal vs imaging to evaluate placement.    Shelby Lay PA-C  Hospitalist Service

## 2023-08-23 NOTE — PLAN OF CARE
"Goal Outcome Evaluation:    VS: BP (!) 146/40 (BP Location: Left arm)   Pulse 67   Temp 97.3  F (36.3  C) (Oral)   Resp 19   Ht 1.778 m (5' 10\")   Wt 104 kg (229 lb 4.5 oz)   SpO2 95%   BMI 32.90 kg/m    Denied CP & SOB   O2: Sats > 93%   Output: Voids independently and without difficulty in bedside urinal   Last BM: 08/22/2023 x2   Activity: Assist of 1-2 and lift in room  Pt repositions himself   Skin: DEEP Skin  Report from RN -L heel wound, sacum/coccyx/buttocks is red/peeling and is blanchable. Dry peeling skin on bilateral heels, scabs on bilateral shins, multiple scattered scrapes and bruises.    Pain: Managed with scheduled tylenol and tramadol   CMS: AO4  Denies N/T  Intermittent confusion   Dressing: BLE heels with mepilex CDI  sacrum/coccyx/buttocks open to air   Diet: Renal diet, poor appetite for hospital food, girlfriend orders food for him and has delivered to the hospital.   [employer] sandwiches in fridge.    LDA: R chest CVC flushes well with blood return.    Equipment: Lift, bariatric commode, bariatric recliner, personal belongings   Plan: Transfer to TCU   Additional Info: No issues overnight       "

## 2023-08-23 NOTE — PLAN OF CARE
VS: Temp: 97.3  F (36.3  C) Temp src: Oral BP: (!) 146/40 Pulse: 67   Resp: 19 SpO2: 95 % O2 Device: None (Room air)       O2: Pt.'s O2 sats above 90 on room air, denies shortness of breath, denies chest pain   Output: Pt. Is voiding independently and without difficulty in bedside urinal   Last BM: 08/22/2023 x2   Activity: Pt. Got up to chair this afternoon and then back to bed using Liko lift and assist of 2. Able to turn in bed with assist of 1.    Skin: L heel wound, sacum/coccyx/buttocks is red/peeling and is blanchable. Dry peeling skin on bilateral heels, scabs on bilateral shins, multiple scattered scrapes and bruises. Pt. Refused assessment of groin folds, but I suspect that area is also red as it is painful for pt.    Pain: Managed with scheduled tylenol and tramadol   CMS: A&O x4, denies numbness and tingling. Pt. Is intermittently confused but has not been this shift   Dressing: Bilateral heels with mepilex CDI, sacrum/coccyx/buttocks open to air, R chest CVC transparent dressing CDI   Diet: Renal diet, poor appetite for hospital food, girlfriend orders food for him and has delivered to the hospital. [employer] sandwiches in fridge.    LDA: R chest CVC flushes well but no blood return. Second dose of alteplase administered and results are pending, due to be checked at 2330. L heel wound with mepilex, sacrum/coccyx/buttocks wound is open to air   Equipment: Liko lift, bariatric commode, bariatric recliner, personal belongings   Plan: Transfer to TCU   Additional Info:

## 2023-08-23 NOTE — PROGRESS NOTES
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 Meyers and found to have MRSA bacteremia now on prolonged oral antibiotic therapy to complete treatment course. Stable and awaiting discharge to TCU at this time.    Plan for today   - Awaiting for TCU bed  - Continue current care   - Will loop in ID given plans for outpatient follow-up on 8/21    # MRSA bacteremia due to indwelling drain complicated by left hip septic arthritis s/p debridement and antibiotic and implant retention on 6/21/23, all are POA  The patient's bacteremia has resolved.  Blood cultures: 6/14, 6/15 + for MRSA (4/4), and 6/16 (1/2 GPCs) and 6/17 (2/2) GPCs. - started vancomycin 6/15.   6/17 sinovial fluid cultures with staph and klebsiella. Purulent drainage noted at incision site at TCU, CT left hip concerning for infection.KEILA did not show endocarditis.  The patient received ceftazidime and vancomycin for management of corynebacterium, Pseudomonas, and MRSA during this hospitalization.  - Continue WBAT LLE, will need imaging including XR hip at some point this week   - Posterior hip precautions x3 months (no flexion beyond 90 degrees, adduction beyond midline, internal rotation beyond midline)  - Antimicrobial therapy:   - Ceftazidime (6/20-8/1)   - Vancomycin (6/20-8/1)   - Ciprofloxacin (8/2-)   - Doxycycline (8/2-)   - Continue PO abx through 9/13 to complete total 12 week course  -The patient will need infectious disease follow-up on 8/21 per Dr. Mercer' last  note, will re-engage ID team  -The patient was seen in consultation with prosthetics and infectious disease    # Neck pain secondary to multilevel cervical degenerative changes greatest at C6 and C7  # Left foot pain secondary to significant degenerative changes and unstageable left heel ulcer, follow-up.  Patient was ruled out for fracture and osteomyelitis given his intermediate high pretest probability with change of mental status and MRSA bacteremia using advanced imaging with MRI especially in setting of worsening neck and foot pain.    - Acetaminophen 1 g every 8 hours, diclofenac 2 g 4 times daily, lidocaine patch, Robaxin 500 mg at bedtime, and tramadol at 2.5 mg every 6 hours scheduled with holding parameters.    #Unwitnessed fall  First episode occurred overnight 8/12 and patient had an injury to the left lower extremity. Xrs were without e/o fracture. On 8/22, developed urgency for a bowel movement, was sitting up at side of bed and slid to the floor - reported pain in his left and right knees from hitting the ground, but no pain or discomfort in the left hip. Exam of left hip limited due to pain but no e/o trauma, left hip incision appears c/d/I. Neuro exam unremarkable. Right lower extremity exam unremarkable. The following day (8/23), patient reported no ongoing pain symptoms or concerns after his fall  - monitor for changing symptoms, need for imaging   - mental status stable, neuro exam non-focal, no need for imaging  - fall precautions    # Hypertension  - Cont PTA amlodipine 10 mg daily  - Initiated on carvedilol 6.25 mg twice daily    # Acute on chronic blood loss anemia   - The patient had three units of blood transfusion  - Continue oral iron, folic acid, multivitamins and thiamine.   - Will obtain labs once every three days to minimize blood loss through phlebotomy.     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  - Continues on  Apixaban 2.5 mg PO BID, resumed on 8/2 postoperatively     #Multifactorial encephalopathy - likely toxic metabolic, uremic, delirium, depression contributing, not clear if present on admission, resolved     #Acute Kidney Injury complicated by hypernatremia on top of chronic kidney disease stage I-II, unclear if present on admission  The patient was expected to have dialysis but however he did not require during this hospitalization.  Although line was placed it was removed without complications also during this hospitalization. Renal function slowly stabilizing with time. We are now trending BMP q72h.     #Asymptomatic bacteruria (VRE)  UA obtained on 7/19 to assess for casts, reflexed to culture which is growing VRE. Labs and vitals with low concern for new infection. Discussed with ID who recommended against treatment.    #Hypoxemic Respiratory Failure due to immobility, surgery, not clear if present on admission, resolved    #Constipation, resolved with the current regimen     #Hypomagnesemia  -monitor and replete as indicated    # Chronic medical problems:  L heel pressure wound, unstagable  We are following recommendations of wound nurse    #Type 2 diabetes mellitus  Being monitored off insulin     Severe malnutrition in the context of acute on chronic illness  Following dietitian recommendations     Adjustment disorder with depressed mood and prolonged grief disorder  Patient is declined psychotropic medications    Hyperlipidemia-continue PTA Lipitor 40 mg daily    JAIR-CPAP when sleeping       Diet: Renal Diet (non-dialysis)  Diet    DVT Prophylaxis: DOAC  Tran Catheter: Not present  Lines: PRESENT      CVC Single Lumen Right Internal jugular Non - valved (open ended);Tunneled;Power injectable-Site Assessment: WDL      Cardiac Monitoring: None  Code Status: Full Code      Clinically Significant Risk Factors            # Hypomagnesemia: Lowest Mg = 1.4 mg/dL in last 2 days, will replace as needed   #  "Hypoalbuminemia: Lowest albumin = 2.2 g/dL at 7/8/2023  8:51 AM, will monitor as appropriate            # Obesity: Estimated body mass index is 32.9 kg/m  as calculated from the following:    Height as of this encounter: 1.778 m (5' 10\").    Weight as of this encounter: 104 kg (229 lb 4.5 oz).   # Severe Malnutrition: based on nutrition assessment           Disposition Plan           Naveed Cobb MD  Hospitalist Service, GOLD TEAM 21  [facility]  Securely message with [employer] (more info)  Text page via [facility] Paging/Directory   See signed in provider for up to date coverage information  ______________________________________________________________________    Interval History   Doing OK today. Moved rooms last night - not sure why. Able to work a little more with therapy yesterday. Frustrated that sometimes people don't listen to him about transfers/using a lift given the ongoing sensitivity and pain in his left leg. Otherwise doing OK. No fevers/chills. No headache. No other acute findings today.    Physical Exam   Vital Signs: Temp: 97.3  F (36.3  C) Temp src: Oral BP: (!) 146/40 Pulse: 67   Resp: 19        Weight: 229 lbs 4.45 oz    General Appearance: Frail elderly male, lying in bed, full range of affect c/w flat affect yesterday  Respiratory: Breathing comfortably on room air, lungs grossly CTAB  Cardiovascular: RRR, no m/r/g.  GI: soft, non-distended, non-tender to palpation, BS+  Skin: no skin rash, pallor present  Other: LLE held in position. Left ankle and foot plantar flexed. No pain with palpation over medial/lateral malleoli. Very hypersensitive to even light touch over the skin of the dorsal foot and calf.     Medical Decision Making       45 MINUTES SPENT BY ME on the date of service doing chart review, history, exam, documentation & further activities per the note.      Data     I have personally reviewed the following data over the past 24 " hrs:    7.8  \   7.5 (L)   / 267     140 104 30.7 (H) /  95   4.1 26 2.01 (H) \     ALT: 15 AST: 19 AP: 73 TBILI: 0.3   ALB: 3.3 (L) TOT PROTEIN: 6.1 (L) LIPASE: N/A       Imaging results reviewed over the past 24 hrs:   Recent Results (from the past 24 hour(s))   XR Chest Port 1 View    Narrative    EXAM: XR CHEST PORT 1 VIEW 8/22/2023 1:53 PM    DEMOGRAPHICS: 72 years Male    INDICATION: Eval for appopriate PICC placement, no blood return on  port    COMPARISON: Chest x-ray 6/14/2023    TECHNIQUE: Single portable AP view of the chest.    FINDINGS:   Right IJ central venous catheter with tip in the SVC. The  cardiomediastinal silhouette appears stable. The trachea is midline.  The right lung is clear. The partially visualized left lung is clear.  The right costophrenic angle is sharp with no blunting. There is no  discernible pneumothorax. No acute osseous abnormality. Prominent  osteophyte formation at both sides of the right acromioclavicular  joint, consistent with osteoarthritis.      Impression    IMPRESSION:     1. Right IJ central venous catheter with tip in SVC  2. No focal consolidation in the visualized lungs.    I have personally reviewed the examination and initial interpretation  and I agree with the findings.    TAE MORRIS MD         SYSTEM ID:  [id number]

## 2023-08-23 NOTE — PROGRESS NOTES
Care Management Follow Up    Length of Stay (days): 67    Expected Discharge Date: TBD, pending spend down payment from pt's Financial POA     Concerns to be Addressed: Discharge planning  Patient plan of care discussed at interdisciplinary rounds: Yes    Anticipated Discharge Disposition: Return to Transitional Care    [facility]  [address]  4th [place] [address]  Admissions: [phone]  RN Station: [phone]      Anticipated Discharge Services:  (Post acute therapies, wound care)  Anticipated Discharge DME: None    Patient/family educated on Medicare website which has current facility and service quality ratings: yes  Education Provided on the Discharge Plan: Yes  Patient/Family in Agreement with the Plan: yes    Referrals Placed by CM/SW:  ([facility])  Private pay costs discussed: MA for LTC eligibility and necessity to secure payment for TCU/LTC previously discussed during pt's 05/26/23 - 06/02/23 hospitalization. Pt's Financial POA, Pollo Bustos, has been working on pt's MA for LTC application.        Additional Information:  It has been confirmed by [facility] billing office that pt has indeed exhausted his 100-day Medicare benefit.    ZEB in communication with [facility] SW who reported that, per Leadership, [facility] will accept pt back after Pollo pays [facility] $10,000 as a down payment for 20 days, as pt does not have an insurance payor source and spend down will be required by [facility] for MA for LTC. There is also a h/o Luke not doing what's required by the [facility] in a timely manner.      1520: ZEB sent the below email to pt's brother, Ivan ([email or web address]), and Financial POAPollo ([email or web address]):    Benny Abad and Ivan,  I just wanted to update the both of you on Don's discharge progress. It has been confirmed by [facility] billing office that Don has indeed exhausted his 100-day Medicare benefit, meaning that he has no payor source.  [facility] is agreeable to taking Don back, but  only after they receive $10,000 (check or card) as a down payment for 20 days.  The [facility] will require that Rahat spenddown his money on healthcare costs before qualifying for MA for LTC, and so paying [facility] is a reasonable request and a means to spenddown Don's money appropriately.  Rahat continues to be medically ready to discharge and [facility] will be able to take care of him until MA for LTC is open. [facility] will then assist with finding a [employer]-contracted skilled nursing facility that offers long-term care, as discussed and agreed upon at Don's Care Conference.   Please let me know how you would like to pay the above amount in order to proceed with Rahat's transfer to [facility].    Thank you for helping address this in a timely manner.           VANESA Minor, LSW  [facility]   PHONE: [phone]  Pager: [phone]

## 2023-08-23 NOTE — PROGRESS NOTES
"  VS: BP (!) 149/67 (BP Location: Left arm, Patient Position: Semi-Fuentes's, Cuff Size: Adult Regular)   Pulse 68   Temp 97.7  F (36.5  C) (Oral)   Resp 20   Ht 1.778 m (5' 10\")   Wt 104 kg (229 lb 4.5 oz)   SpO2 99%   BMI 32.90 kg/m      O2: 99 on RA   Output: adequate   Last BM: 8/23/2023- smear   Activity: independent with urinal, sat up in chair with PT/OT   Skin: Intact; scattered scabs on lower left leg, L heel wound, buttocks red, 2 small specs of raw tissue- barrier cream applied   Pain: Denies pain   CMS: Intact, denies numbness, tingling, SOB, headache, chest pain   Dressing: L heel dressing changed per WOC orders   Diet: Regular diet   LDA: Right chest port   Equipment: IV pole   Plan: Pending TCU placement   Additional Info: Mg 1.3 at morning lab draw; Mg protocol followed redraw @ 2136      "

## 2023-08-24 NOTE — CONSULTS
General ID [facility] Service: Follow-up Note      Patient:  Waldo Bustos, Date of birth 1950, Medical record number [id number]  Date of Visit:  August 24, 2023  Reason for consult: MRSA bacteremia and PJI of left hip         Assessment and Recommendations:     ID Problem list:  MRSA bacteremia, suspected secondary to left hip PJI and associated soft tissue infection; KEILA negative for IE   Left hip PJI s/p DAIR (6/21/23 with Dr. Meyers) cultures: MRSA, C.acnes, and Pseudomonas; hardware appearance was relatively reassuring so components left in place. Nidus may have been indwelling drain.  Prior recurrent L BELLA PJI over multiple years since 2018 eventually treated with 2-stage revision with explant in 11/2022 (pre-op cx proteus mirabilis and porphyromonas, intra-op cx Proteus mirabilis and Finegoldia magna) with second-stage in 5/2023 following aspiration with negative cultures prior to hardware reinsertion   RA  Venous Insufficiency  CKD  Diabetes  Lytic lesion in the medial L ilium, measuring 3.6x5.6 cm, present since 2018       Discussion:  Overall patient seems to be doing well after transition to oral regimen. No signs of ongoing infection at this time, with wound healing well without drainage or erythema. Will plan to continue 12 week total course of antibiotics for PJI with retained hardware.     Recs:  Recommend continue Doxycycline and Ciprofloxacin for planned course through 9/13 per Dr. Bettencourt' last note  Please check ESR, CRP weekly to make sure they are trending in the right direction  Has scheduled ID clinic follow-up on 9/1/2023 if patient discharged before that time  If patient remains inpatient at the end of his therapy, please re-consult inpatient ID to determine if there is a need for additional antibiotic therapy    Case discussed with Dr. Rey, staff ID attending  Thank you for allowing us to participate in the care of this patient. ID will sign off at this time. Can call with  questions.     Attestation:  Abisai Pierce MD  PGY-4 Infectious Disease Fellow   08/24/2023            Interval History:     Patient has been doing well since transition to oral antibiotics. He has not had any fever, chills, increased pain, drainage, or erythema of his left hip. Patient is currently awaiting transition to TCU for further care.         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  Postop course complicated by profound deconditioning.  He was admitted to TCU initially on 12/23/2022 for physical rehabilitation, however therapies no longer worked with patient after several months of an in bed therapy and his refusals to mobilize.  Underwent biopsy on 4/7/2023 without evidence of infection.  He was transferred back to [facility] for left total hip arthroplasty and reimplantation on 5/26/23 with Dr. Meyers.  Presented again to [facility] for persistent MRSA bacteremia on 6/17.      Infectious diseases was previously consulted given the bacteremia secondary to the PJI. ID signed off on 7/9 as cultures were finalized growing MRSA, C. Acnes, and pseudomonas - plan for 6 weeks of antibiotic therapy to end on 8/1/2023. Patient has been progressively encephalopathic with rising BUN, creatinine with declining urine out put concerning that dialysis may be in his future. Care conference on 7/21 with plan for 2 weeks of dialysis to see what recovery or mental status changes may occur.     Given continued encephalopathy and rising creatinine though, UA obtained. Reflexed to culture and is positive for VRE. Currently, he has no urinary symptoms concerning for VRE  causing disease.          Review of Systems:   Full 12 point ROS obtained, pertinent positives and negatives as above.          Current Antimicrobials     Ciprofloxacin 8/2--> 9/13/2023  Doxycycline 8/2--> 9/13/2023           Physical Exam:   Ranges for vital signs:  Temp:  [97.3  F (36.3  C)-98  F (36.7  C)] 97.9  F (36.6  C)  Pulse:  [62-71] 62  Resp:  [16-17] 16  BP: (116-134)/(41-73) 134/73  SpO2:  [94 %-99 %] 94 %    Intake/Output Summary (Last 24 hours) at 8/24/2023 1517  Last data filed at 8/24/2023 0715  Gross per 24 hour   Intake 20 ml   Output 150 ml   Net -130 ml     Exam:  GENERAL:  well-developed, well-nourished, lying in bed in no acute distress.   ENT:  Head is normocephalic, atraumatic.  EYES:  No conjunctival injection.   LUNGS:  Unlabored breathing. Clear to auscultation.  CARDIOVASCULAR:  Regular rate and rhythm with no murmurs, rubs, or gallops.  ABDOMEN:  Non-distended, soft, nontender.  EXT: Extremities warm and well perfused. No edema. Left hip with well healed surgical site. Non-tender, no erythema or drainage noted.  SKIN:  No acute rashes.   NEUROLOGIC:  Awake, alert, interactive.         Laboratory Data:       Inflammatory Markers    Recent Labs   Lab Test 08/12/23  1217 08/09/23  0803 08/03/23  1356 07/27/23  0803 07/17/23  0841 07/10/23  1316 07/04/23  0627 06/29/23  0720 06/26/23  0513 02/13/23  0817 01/09/23  1212 12/19/22  0905   SED 92* 116*  --   --   --   --   --   --  1  --  77* 86*   CRPI 4.85 7.49* 24.57* 17.93* 18.94* 32.85* 42.85* 24.72* 15.75*   < >  --   --     < > = values in this interval not displayed.       Hematology Studies    Recent Labs   Lab Test 08/23/23  1124 08/21/23  1043 08/18/23  0815 08/15/23  0856 08/14/23  0832 08/13/23  1428   WBC 7.8 7.5 7.9 6.6 7.1 5.7   HGB 7.5* 7.7* 7.5* 7.0* 7.0* 7.9*   MCV 95 94 95 96 95 96    272 295 253 265 222       Metabolic Studies     Recent Labs   Lab Test 08/24/23  0717 08/23/23  1124 08/21/23  1043 08/20/23  1120  08/18/23  0815 08/17/23  0752 08/15/23  0856 08/14/23  0832   NA  --  140 140  --  140  --  139 142   POTASSIUM  --  4.1 4.2 4.3 4.9 4.2 4.0 4.1   CHLORIDE  --  104 107  --  106  --  105 107   CO2  --  26 24  --  25  --  24 23   BUN  --  30.7* 30.1*  --  31.8*  --  38.4* 38.7*   CR 2.10* 2.01* 2.13*  --  2.22* 2.31* 2.34* 2.48*   GFRESTIMATED 33* 35* 32*  --  31* 29* 29* 27*       Hepatic Studies    Recent Labs   Lab Test 08/23/23  1124 08/21/23  1043 08/18/23  0815 08/15/23  0856 08/14/23  0832 08/13/23  1428   BILITOTAL 0.3 0.3 0.3 0.2 0.2 0.2   ALKPHOS 73 74 76 72 72 63   ALBUMIN 3.3* 3.4* 3.2* 3.1* 3.3* 2.8*   AST 19 17 18 17 16 18   ALT 15 15 14 12 11 10       Microbiology:  Culture   Date Value Ref Range Status   07/19/2023 10,000-50,000 CFU/mL Enterococcus faecium VRE (A)  Final     Comment:          06/21/2023 1+ Cutibacterium (Propionibacterium) acnes (A)  Final     Comment:     Susceptibility testing of Cutibacterium (Propionibacterium) species is not done from this source. This organism is susceptible to penicillin and cefotaxime and most are susceptible to clindamycin.   06/21/2023 No anaerobic organisms isolated  Final   06/21/2023 No anaerobic organisms isolated  Final   06/21/2023 No anaerobic organisms isolated  Final   06/21/2023 1+ Staphylococcus aureus (A)  Final     Comment:     Susceptibilities done on previous cultures   06/21/2023 1+ Pseudomonas aeruginosa (A)  Final   06/21/2023 1+ Staphylococcus aureus MRSA (A)  Final   06/21/2023 2+ Staphylococcus aureus (A)  Final     Comment:     Susceptibilities done on previous cultures   06/21/2023 1+ Pseudomonas aeruginosa (A)  Final     Comment:     Susceptibilities done on previous cultures   06/21/2023 1+ Staphylococcus aureus (A)  Final     Comment:     Susceptibilities done on previous cultures   06/21/2023 2+ Staphylococcus aureus (A)  Final     Comment:     Susceptibilities done on previous cultures   06/21/2023 No anaerobic organisms isolated   Final   06/21/2023 2+ Staphylococcus aureus MRSA (A)  Final   06/20/2023 No Growth  Final   06/20/2023 No Growth  Final   06/19/2023 No Growth  Final   06/19/2023 No Growth  Final   06/17/2023 4+ Staphylococcus aureus MRSA (A)  Final   06/17/2023 2+ Corynebacterium striatum (A)  Final     Comment:     Identification obtained by MALDI-TOF mass spectrometry research use only database. Test characteristics determined and verified by the Infectious Diseases Diagnostic Laboratory.   06/17/2023 1+ Klebsiella pneumoniae (A)  Final   06/17/2023 No anaerobic organisms isolated  Final   06/17/2023 Positive on the 1st day of incubation (A)  Final   06/17/2023 Staphylococcus aureus MRSA (AA)  Final     Comment:     1 of 2 bottlesSusceptibilities done on previous cultures   06/17/2023 Positive on the 1st day of incubation (A)  Final   06/17/2023 Staphylococcus aureus MRSA (AA)  Final     Comment:     1 of 2 bottlesSusceptibilities done on previous cultures   06/17/2023 <10,000 CFU/mL Urogenital marimar  Final   06/16/2023 Positive on the 1st day of incubation (A)  Final   06/16/2023 Staphylococcus aureus MRSA (AA)  Final     Comment:     1 of 2 bottlesSusceptibilities done on previous cultures   06/16/2023 Positive on the 1st day of incubation (A)  Final   06/16/2023 Staphylococcus aureus MRSA (AA)  Final     Comment:     1 of 2 bottlesSusceptibilities done on previous cultures   06/15/2023 Positive on the 1st day of incubation (A)  Final   06/15/2023 Staphylococcus aureus MRSA (AA)  Final     Comment:     2 of 2 bottlesSusceptibilities done on previous cultures   06/15/2023 Positive on the 1st day of incubation (A)  Final   06/15/2023 Staphylococcus aureus MRSA (AA)  Final     Comment:     2 of 2 bottlesSusceptibilities done on previous cultures   06/14/2023 Positive on the 1st day of incubation (A)  Final   06/14/2023 Staphylococcus aureus MRSA (AA)  Final     Comment:     2 of 2 bottlesSusceptibilities done on previous cultures    06/14/2023 Positive on the 1st day of incubation (A)  Final   06/14/2023 Staphylococcus aureus MRSA (AA)  Final     Comment:     2 of 2 bottles     05/26/2023 No anaerobic organisms isolated  Final   05/26/2023 No anaerobic organisms isolated  Final   05/26/2023 No anaerobic organisms isolated  Final   05/26/2023 No anaerobic organisms isolated  Final   05/26/2023 No Growth  Final   05/26/2023 No Growth  Final   05/26/2023 No Growth  Final   05/26/2023 No Growth  Final   05/26/2023 No anaerobic organisms isolated  Final   05/26/2023 No Growth  Final   04/07/2023 No anaerobic organisms isolated  Final   04/07/2023 No Growth  Final   04/07/2023 No anaerobic organisms isolated  Final   04/07/2023 No Growth  Final   04/07/2023 No anaerobic organisms isolated  Final   04/07/2023 No Growth  Final   04/07/2023 No anaerobic organisms isolated  Final   04/07/2023 No Growth  Final   04/07/2023 No anaerobic organisms isolated  Final   04/07/2023 No Growth  Final   04/07/2023 No anaerobic organisms isolated  Final   04/07/2023 No Growth  Final   11/22/2022 4+ Finegoldia magna (A)  Final     Comment:     Susceptibilities done on previous cultures   11/22/2022 No Growth  Final   11/22/2022 1+ Proteus mirabilis (A)  Final     Comment:     Susceptibilities done on previous cultures   11/22/2022 1+ Finegoldia magna (A)  Final     Comment:     Susceptibilities done on previous cultures   11/22/2022 No Growth  Final   11/22/2022 1+ Proteus mirabilis (A)  Final   11/22/2022 No anaerobic organisms isolated  Final   11/22/2022 No anaerobic organisms isolated  Final   11/22/2022 No anaerobic organisms isolated  Final   11/22/2022 No Growth  Final   11/22/2022 No Growth  Final   11/22/2022 No Growth  Final   11/22/2022 Isolated in broth only Proteus mirabilis (A)  Final     Comment:     On day 1 of incubationSusceptibilities done on previous cultures   11/22/2022 Isolated in broth only Proteus mirabilis (A)  Final     Comment:     On day  1 of incubationSusceptibilities done on previous cultures   11/22/2022 1+ Proteus mirabilis (A)  Final   11/22/2022 No anaerobic organisms isolated  Final   11/22/2022 No Growth  Final   11/22/2022 1+ Proteus mirabilis (A)  Final     Comment:     Susceptibilities done on previous cultures   11/14/2022 4+ Porphyromonas species (A)  Final     Comment:     Beta Lactamase Positive  Identification obtained by MALDI-TOF mass spectrometry research use only database. Test characteristics determined and verified by the Infectious Diseases Diagnostic Laboratory.   11/14/2022 1+ Finegoldia magna (A)  Corrected   11/14/2022 3+ Proteus mirabilis (A)  Final   11/14/2022 No Growth  Final       Urine Studies    Recent Labs   Lab Test 07/19/23  0957 06/30/23  2041 06/22/23  1457 06/17/23  0817   LEUKEST Small* Small* Large* Moderate*   WBCU 27* 3 66* 14*       Vancomycin Levels    Recent Labs   Lab Test 07/27/23  0803 07/26/23  0805 07/21/23  1125   VANCOMYCIN 21.3 24.6 22.4       Hepatitis B Testing No lab results found.  Hepatitis C Testing   No results found for: HCVAB, HQTG, HCGENO, HCPCR, HQTRNA, HEPRNA  Respiratory Virus Testing    No results found for: RS, FLUAG

## 2023-08-24 NOTE — PLAN OF CARE
VS: Temp: 98  F (36.7  C) Temp src: Oral BP: 116/66 Pulse: 71   Resp: 16 SpO2: 99 % O2 Device: None (Room air)      O2: SpO2 > 95% and stable on RA. LS clear and equal bilaterally. Denies chest pain and SOB.    Output: Voids spontaneously without difficulty using beside urinal.   Last BM: 8/22/2023, denies abdominal discomfort. BS active / passing flatus.    Activity: Not Oob this shift. Pt was able to turn & shift weight.    Skin: WDL except, L heel wound, Blanchable redness on buttocks & coccyx area, barrier cream applied. Bruises on BUE. Scattered Scabs on Bilateral shin.    Pain: Pain managed with scheduled Tramadol & Robaxin.    CMS: Intact, AOx4. Denies numbness and tingling.   Dressing: Mepilex in bilateral heals CDI.    Diet: Regular diet. Denies nausea/vomiting.   BG check at bedtime 108.    LDA: CVC single lumen to the R chest WDL; flushed & blood return noted.   Equipment: IV pole, personal belongings,    Plan: Fall & contact precautions maintained / Continue with plan of care. Call light within reach, pt able to make needs known.    Additional Info:

## 2023-08-24 NOTE — PLAN OF CARE
"Goal Outcome Evaluation:    VSS /73   Pulse 62   Temp 97.9  F (36.6  C)   Resp 16   Ht 1.778 m (5' 10\")   Wt 98.7 kg (217 lb 9.5 oz)   SpO2 94%   BMI 31.22 kg/m      O2 RA denies SOB CP   Output Voiding via bedside urinal. No c/o buring   LBM    Activity A2 lift to chair and bed.   Up for meal yes   Skin L hip, left foot    Pain Yes manage with tramadol   Neuro/CMS AX4   Dressing L hip   Diet R-thin whole   LDA R cvc single   Plan Continue Poc         "

## 2023-08-24 NOTE — PROGRESS NOTES
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 Meyers and found to have MRSA bacteremia now on prolonged oral antibiotic therapy to complete treatment course. Stable and awaiting discharge to TCU at this time.    Plan for today   - Awaiting for TCU bed  - Continue current care   - Will loop in ID given recommendation for follow-up week of 8/21  - Trending labs q72h  - Will touch base w/ Ortho re: imaging, follow-up as no disposition plan in place at this time    # MRSA bacteremia due to indwelling drain complicated by left hip septic arthritis s/p debridement and antibiotic and implant retention on 6/21/23, all are POA  Blood cultures: 6/14, 6/15 + for MRSA (4/4), and 6/16 (1/2 GPCs) and 6/17 (2/2) GPCs. - started vancomycin 6/15.   6/17 sinovial fluid cultures with staph and klebsiella. Purulent drainage noted at incision site at TCU, CT left hip concerning for infection.KEILA did not show endocarditis.  The patient received ceftazidime and vancomycin for management of corynebacterium, Pseudomonas, and MRSA during this hospitalization.  - Continue WBAT LLE, will need imaging including XR hip    - Posterior hip precautions x3 months (no flexion beyond 90 degrees, adduction beyond midline, internal rotation beyond midline)  - Antimicrobial therapy:   - Ceftazidime (6/20-8/1)   - Vancomycin (6/20-8/1)   - Ciprofloxacin (8/2-)   - Doxycycline (8/2-)   - Continue PO abx through 9/13 to complete total 12 week course  -The patient will need  infectious disease follow-up on ~8/21 per Dr. Mercer' last note, will re-engage ID team  -The patient was seen in consultation with prosthetics and infectious disease    # Neck pain secondary to multilevel cervical degenerative changes greatest at C6 and C7  # Left foot pain secondary to significant degenerative changes and unstageable left heel ulcer, follow-up.  Patient was ruled out for fracture and osteomyelitis given his intermediate high pretest probability with change of mental status and MRSA bacteremia using advanced imaging with MRI especially in setting of worsening neck and foot pain.    - Pain control: acetaminophen 1 g every 8 hours, diclofenac 2 g 4 times daily, lidocaine patch, Robaxin 500 mg at bedtime, and tramadol at 2.5 mg every 6 hours scheduled with holding parameters.    #Unwitnessed fall  First episode occurred overnight 8/12 and patient had an injury to the left lower extremity. Xrs were without e/o fracture. On 8/22, developed urgency for a bowel movement, was sitting up at side of bed and slid to the floor - reported pain in his left and right knees from hitting the ground, but no pain or discomfort in the left hip. Exam of left hip limited due to pain but no e/o trauma, left hip incision appears c/d/I. Neuro exam unremarkable. Right lower extremity exam unremarkable. The following day (8/23), patient reported no ongoing pain symptoms or concerns after his fall  - monitor for changing symptoms, need for imaging   - mental status stable, neuro exam non-focal, no need for imaging  - fall precautions    # Hypertension  - Cont PTA amlodipine 10 mg daily  - Initiated on carvedilol 6.25 mg twice daily    # Acute on chronic blood loss anemia   - The patient had three units of blood transfusion (last was 7/7)  - Continue oral iron, folic acid, multivitamins and thiamine.   - Will obtain labs once every three days to minimize blood loss through phlebotomy.     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  - Continues on Apixaban 2.5 mg PO BID, resumed on 8/2 postoperatively     #Multifactorial encephalopathy - likely toxic metabolic, uremic, delirium, depression contributing, not clear if present on admission, resolved     #Acute Kidney Injury complicated by hypernatremia on top of chronic kidney disease stage I-II, unclear if present on admission  The patient was expected to have dialysis but however he did not require during this hospitalization.  Although line was placed it was removed without complications also during this hospitalization. Renal function slowly stabilizing with time.   - We are now trending BMP q72h.     #Asymptomatic bacteruria (VRE)  UA obtained on 7/19 to assess for casts, reflexed to culture which is growing VRE. Labs and vitals with low concern for new infection. Discussed with ID who recommended against treatment.    #Hypoxemic Respiratory Failure due to immobility, surgery, not clear if present on admission, resolved    #Constipation, resolved with the current regimen     #Hypomagnesemia  -monitor and replete as indicated    # Chronic medical problems:  L heel pressure wound, unstagable  We are following recommendations of wound nurse    #Type 2 diabetes mellitus  Being monitored off insulin     Severe malnutrition in the context of acute on chronic illness  Following dietitian recommendations     Adjustment disorder with depressed mood and prolonged grief disorder  Patient is declined psychotropic medications    Hyperlipidemia-continue PTA Lipitor 40 mg daily    JAIR-CPAP when sleeping     Diet: Diet  Regular Diet Adult    DVT Prophylaxis: DOAC  Tran Catheter: Not present  Lines: PRESENT      CVC Single Lumen Right Internal jugular Non - valved (open ended);Tunneled;Power injectable-Site Assessment: WDL      Cardiac Monitoring: None  Code Status: Full Code      Clinically Significant Risk Factors            #  "Hypomagnesemia: Lowest Mg = 1.3 mg/dL in last 2 days, will replace as needed   # Hypoalbuminemia: Lowest albumin = 2.2 g/dL at 7/8/2023  8:51 AM, will monitor as appropriate            # Obesity: Estimated body mass index is 31.22 kg/m  as calculated from the following:    Height as of this encounter: 1.778 m (5' 10\").    Weight as of this encounter: 98.7 kg (217 lb 9.5 oz).   # Severe Malnutrition: based on nutrition assessment           Disposition Plan           Naveed Cobb MD  Hospitalist Service, GOLD TEAM 21  [facility]  Securely message with [employer] (more info)  Text page via [employer] Paging/Directory   See signed in provider for up to date coverage information  ______________________________________________________________________    Interval History   Doing okay today. Had some difficulty with therapy yesterday due to pain in his legs - discussed optimal timing of pain medications prior to therapy, he is not sure if that will help. Otherwise denies any new chest pain, shortness of breath. Also endorsing some abdominal pain, relates this to taking pills on an empty stomach.    Physical Exam   Vital Signs: Temp: 98  F (36.7  C) Temp src: Oral BP: 116/66 Pulse: 71   Resp: 16 SpO2: 99 % O2 Device: None (Room air)    Weight: 217 lbs 9.5 oz    General Appearance: Elderly male, cantankerous, NAD  Respiratory: Breathing comfortably on room air, lungs grossly CTAB  Cardiovascular: RRR, no m/r/g.  GI: soft, non-distended, non-tender to palpation  Skin: pallor present, no skin rash  Other: Post surgical scar along left lateral leg appears c/d/I. Pt still with hypersensibility with palpation over the knee, calf, ankle, hip of the left lower extremity.    Medical Decision Making       45 MINUTES SPENT BY ME on the date of service doing chart review, history, exam, documentation & further activities per the note.      Data     I have personally reviewed the following data " over the past 24 hrs:    7.8  \   7.5 (L)   / 267     140 104 30.7 (H) /  108 (H)   4.1 26 2.10 (H) \     ALT: 15 AST: 19 AP: 73 TBILI: 0.3   ALB: 3.3 (L) TOT PROTEIN: 6.1 (L) LIPASE: N/A       Imaging results reviewed over the past 24 hrs:   No results found for this or any previous visit (from the past 24 hour(s)).

## 2023-08-25 NOTE — PROGRESS NOTES
TCU SW received and email from aidan/PRADIP Abad. Pollo will be able to make a deposit for TCU admission. SW talked with FC. They will call Pollo and get that taken care of.     [facility]   Social Work   [address] [place] [address], [address] [place] [address]  (ph) [phone]

## 2023-08-25 NOTE — PROGRESS NOTES
TCU SW received and email from aidan/PRADIP Abad. [employer]o will be able to make a deposit for TCU admission. SW talked with FC. They will call Pollo and get that taken care of.     [facility]   Social Work   [address] [place] [address], [address]th [place] [address]  (ph) [phone]

## 2023-08-25 NOTE — PLAN OF CARE
VS: Temp: 98.1  F (36.7  C) Temp src: Oral BP: 112/57 Pulse: 69   Resp: 16 SpO2: 99 % O2 Device: None (Room air)      O2: SpO2 > 95% and stable on RA. LS clear and equal bilaterally. Denies chest pain and SOB.    Output: Voids spontaneously without difficulty using beside urinal.   Last BM: 08/23/2023, denies abdominal discomfort. BS active / passing flatus.    Activity: Not Oob this shift. Pt was able to turn & shift weight.     Skin: WDL except, L heel wound, Blanchable redness on buttocks & coccyx area, barrier cream applied. Bruises on BUE. Scattered Scabs on Bilateral shin.     Pain: Pain managed with scheduled Tramadol & Robaxin.    CMS: Intact, AOx4. Pt reported Numbness on BLE.    Dressing: Mepilex in bilateral heals CDI.     Diet: Regular diet. Denies nausea/vomiting.   BG check at bedtime 104.   LDA: CVC single lumen to the R chest WDL; flushed & blood return noted.    Equipment: IV pole, personal belongings,    Plan: Fall & contact precautions maintained / Continue with plan of care. Call light within reach, pt able to make needs known.    Additional Info:    '

## 2023-08-25 NOTE — PLAN OF CARE
VS: Vital signs:  Temp: 97.6  F (36.4  C) Temp src: Oral BP: 138/55 Pulse: 68   Resp: 16 SpO2: 96 % O2 Device: None (Room air)    O2: 96% RA. Denies SOB/CP.    Output: Voids via urinal    Last BM: 6/23/2023   Activity: Chair fast. Liko lift. Assist 2   Up for meals? Yes. Poor appetite    Skin: L heel wound. Dressing CDI.    Pain: Pain manged with PRN (see mar). RLE Lidocaine patches in place   Neuro / CMS: CMS intact    Dressing: L heel dressing   Diet: Regular diet    LDA: R CVC hep locked    Plan: TCU    Additional Info: No acute changes this shift. Refused repositioning.

## 2023-08-25 NOTE — PROGRESS NOTES
Care Management Follow Up    Length of Stay (days): 69    Expected Discharge Date: TBD, pending spend down payment from pt's Financial POA      Concerns to be Addressed: Discharge planning  Patient plan of care discussed at interdisciplinary rounds: Yes    Anticipated Discharge Disposition: [facility] Transitional Care    [facility]  [address]  [address] [place] [address]  Admissions: [phone]  RN Station: [phone]      Anticipated Discharge Services:  (Post acute therapies, wound care)  Anticipated Discharge DME: None    Patient/family educated on Medicare website which has current facility and service quality ratings: yes  Education Provided on the Discharge Plan: Yes  Patient/Family in Agreement with the Plan: yes    Referrals Placed by CM/SW:  ( TCU)  Private pay costs discussed: MA for LTC eligibility and necessity to secure payment for TCU/LTC previously discussed during pt's 05/26/23 - 06/02/23 hospitalization. Pt's Financial POA, Pollo Bustos, has been working on pt's MA for LTC application.         Additional Information:  Pt's Financial POA, Pollo, had responded to SW's previous email yesterday evening re: payment prior to return to  TCU ( TCU Leadership also included in email correspondence). [facility] for multiple forms of payment; however,  was unable to connect with Pollo today, and so will be reaching out to him on Monday (08/28/23).    Pt will be able to return to  TCU once FC connects with Pollo for payment and TCU bed is available.        VANESA Minor, LSW  [facility] ED   PHONE: [phone]  Pager: [phone]

## 2023-08-25 NOTE — PROGRESS NOTES
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 Meyers and found to have MRSA bacteremia now on prolonged oral antibiotic therapy to complete treatment course. Stable and awaiting discharge to TCU at this time.    Plan for today   - Awaiting for TCU bed  - Continue current care    - Trending labs q72h, trending CRP/ESR as well  - Added PO Oxycodone, will try to administer prior to PT as pain control limits therapy efforts     # MRSA bacteremia due to indwelling drain complicated by left hip septic arthritis s/p debridement and antibiotic and implant retention on 6/21/23, all are POA  Blood cultures: 6/14, 6/15 + for MRSA (4/4), and 6/16 (1/2 GPCs) and 6/17 (2/2) GPCs. - started vancomycin 6/15.   6/17 sinovial fluid cultures with staph and klebsiella. Purulent drainage noted at incision site at TCU, CT left hip concerning for infection.KEILA did not show endocarditis.  The patient received ceftazidime and vancomycin for management of corynebacterium, Pseudomonas, and MRSA during this hospitalization.  - Continue WBAT LLE, will need imaging including XR hip    - Posterior hip precautions x3 months (no flexion beyond 90 degrees, adduction beyond midline, internal rotation beyond midline)  - Antimicrobial therapy:   - Ceftazidime (6/20-8/1)   - Vancomycin (6/20-8/1)   - Ciprofloxacin (8/2-)   - Doxycycline (8/2-)   - Continue PO abx through 9/13 to complete total 12 week course  -ID was following, reengaged on 8/24, recommended continuing  current plan of abx through 9/13, will re-engage w/ ID team if still inpt to determine need for longer abx course   - Weekly ESR/CRP to monitor response to therapy  -The patient was seen in consultation with prosthetics and infectious disease    # Neck pain secondary to multilevel cervical degenerative changes greatest at C6 and C7  # Left foot pain secondary to significant degenerative changes and unstageable left heel ulcer, follow-up.  Patient was ruled out for fracture and osteomyelitis given his intermediate high pretest probability with change of mental status and MRSA bacteremia using advanced imaging with MRI especially in setting of worsening neck and foot pain.    - Pain control: acetaminophen 1 g every 8 hours, diclofenac 2 g 4 times daily, lidocaine patch, Robaxin 500 mg at bedtime  - Added pain control w/ PRN Oxycodone particularly for prior to working with therapy; encouraged patient to discuss pain control with the team so we can more effectively manage it for him    #Unwitnessed fall  First episode occurred overnight 8/12 and patient had an injury to the left lower extremity. Xrs were without e/o fracture. On 8/22, developed urgency for a bowel movement, was sitting up at side of bed and slid to the floor - reported pain in his left and right knees from hitting the ground, but no pain or discomfort in the left hip. Exam of left hip limited due to pain but no e/o trauma, left hip incision appears c/d/I. Neuro exam unremarkable. Right lower extremity exam unremarkable. The following day (8/23), patient reported no ongoing pain symptoms or concerns after his fall  - monitor for changing symptoms, need for imaging   - mental status stable, neuro exam non-focal, no need for imaging  - fall precautions    # Hypertension  - Cont PTA amlodipine 10 mg daily  - Initiated on carvedilol 6.25 mg twice daily    # Acute on chronic blood loss anemia   - The patient had three units of blood transfusion (last was  7/7)  - Continue oral iron, folic acid, multivitamins and thiamine.   - Will obtain labs once every three days to minimize blood loss through phlebotomy.     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  - Continues on Apixaban 2.5 mg PO BID, resumed on 8/2 postoperatively     #Multifactorial encephalopathy - likely toxic metabolic, uremic, delirium, depression contributing, not clear if present on admission, resolved     #Acute Kidney Injury complicated by hypernatremia on top of chronic kidney disease stage I-II, unclear if present on admission  The patient was expected to have dialysis but however he did not require during this hospitalization.  Although line was placed it was removed without complications also during this hospitalization. Renal function slowly stabilizing with time.   - We are now trending BMP q72h.     #Asymptomatic bacteruria (VRE)  UA obtained on 7/19 to assess for casts, reflexed to culture which is growing VRE. Labs and vitals with low concern for new infection. Discussed with ID who recommended against treatment.    #Hypoxemic Respiratory Failure due to immobility, surgery, not clear if present on admission, resolved    #Constipation, resolved with the current regimen     #Hypomagnesemia  -monitor and replete as indicated    # Chronic medical problems:  L heel pressure wound, unstagable  We are following recommendations of wound nurse    #Type 2 diabetes mellitus  Being monitored off insulin     Severe malnutrition in the context of acute on chronic illness  Following dietitian recommendations     Adjustment disorder with depressed mood and prolonged grief disorder  Patient is declined psychotropic medications    Hyperlipidemia-continue PTA Lipitor 40 mg daily    JAIR-CPAP when sleeping       Diet: Diet  Regular Diet Adult    DVT Prophylaxis: DOAC  Tran Catheter: Not present  Lines: PRESENT      CVC Single Lumen Right Internal  "jugular Non - valved (open ended);Tunneled;Power injectable-Site Assessment: WDL      Cardiac Monitoring: None  Code Status: Full Code      Clinically Significant Risk Factors            # Hypomagnesemia: Lowest Mg = 1.3 mg/dL in last 2 days, will replace as needed   # Hypoalbuminemia: Lowest albumin = 2.2 g/dL at 7/8/2023  8:51 AM, will monitor as appropriate            # Obesity: Estimated body mass index is 31.22 kg/m  as calculated from the following:    Height as of this encounter: 1.778 m (5' 10\").    Weight as of this encounter: 98.7 kg (217 lb 9.5 oz).   # Severe Malnutrition: based on nutrition assessment           Disposition Plan           Naveed Cobb MD  Hospitalist Service, GOLD TEAM 21  [facility]  Securely message with [employer] (more info)  Text page via [facility] Paging/Directory   See signed in provider for up to date coverage information  ______________________________________________________________________    Interval History   Doing OK today. Unable to work with therapy yesterday due to \"my left leg was on fire.\" Describes it as an aching pain but does have a tingling/burning component to it. Doesn't recall getting any medications prior to wokring with therapy but does think this would be helpful. Otherwise denies any fevers/chills, URI symptoms, chest pain, or shortness of breath. No further concerns at this time.    Physical Exam   Vital Signs: Temp: 98.7  F (37.1  C) Temp src: Oral BP: (!) 143/73 Pulse: 76   Resp: 16 SpO2: 96 % O2 Device: None (Room air)    Weight: 217 lbs 9.5 oz    General Appearance: Frail elderly male, appears older than stated age, sleeping, flat affect but no acute distress  Respiratory: Breathing comfortably on room air, lungs grossly CTAB  Cardiovascular: RRR, no m/r/g.  GI: soft, non-distended, non-tender to palpation  Skin: no skin rash, pallor is appreciated  Other: Post-op left hip wound appears c/d/I. Left knee w/ " lidocaine patch. Left ankle/foot held in plantar flexion due to pain.     Medical Decision Making       45 MINUTES SPENT BY ME on the date of service doing chart review, history, exam, documentation & further activities per the note.      Data     I have personally reviewed the following data over the past 24 hrs:    N/A  \   N/A   / N/A     N/A N/A N/A /  79   4.3 N/A N/A \       Imaging results reviewed over the past 24 hrs:   No results found for this or any previous visit (from the past 24 hour(s)).

## 2023-08-26 NOTE — PROGRESS NOTES
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 Meyers and found to have MRSA bacteremia now on prolonged oral antibiotic therapy to complete treatment course. Stable and awaiting discharge to TCU at this time.    Plan for today   - Awaiting for TCU bed  - Continue current care    - Labs today, trending q72h    # MRSA bacteremia due to indwelling drain complicated by left hip septic arthritis s/p debridement and antibiotic and implant retention on 6/21/23, all are POA  Blood cultures: 6/14, 6/15 + for MRSA (4/4), and 6/16 (1/2 GPCs) and 6/17 (2/2) GPCs. - started vancomycin 6/15.   6/17 sinovial fluid cultures with staph and klebsiella. Purulent drainage noted at incision site at TCU, CT left hip concerning for infection.KEILA did not show endocarditis.  The patient received ceftazidime and vancomycin for management of corynebacterium, Pseudomonas, and MRSA during this hospitalization.  - Continue WBAT LLE     - Posterior hip precautions x3 months (no flexion beyond 90 degrees, adduction beyond midline, internal rotation beyond midline)   - Will touch base w/ Ortho regarding re-imaging hip, initial plan was for outpatient follow-up and reimaging  - Antimicrobial therapy:   - Ceftazidime (6/20-8/1)   - Vancomycin (6/20-8/1)   - Ciprofloxacin (8/2-)   - Doxycycline (8/2-)   - Continue PO abx through 9/13 to complete total 12 week course  -ID was following, reengaged on 8/24, recommended continuing current plan of abx through 9/13, will  re-engage w/ ID team if still inpt to determine need for longer abx course   - Weekly ESR/CRP to monitor response to therapy  -The patient was seen in consultation with prosthetics and infectious disease    # Neck pain secondary to multilevel cervical degenerative changes greatest at C6 and C7  # Left foot pain secondary to significant degenerative changes and unstageable left heel ulcer, follow-up.  Patient was ruled out for fracture and osteomyelitis given his intermediate high pretest probability with change of mental status and MRSA bacteremia using advanced imaging with MRI especially in setting of worsening neck and foot pain.    - Pain control: acetaminophen 1 g every 8 hours, diclofenac 2 g 4 times daily, lidocaine patch, Robaxin 500 mg at bedtime  - Added pain control w/ PRN Oxycodone particularly for prior to working with therapy; encouraged patient to discuss pain control with the team so we can more effectively manage it for him    #Unwitnessed fall  First episode occurred overnight 8/12 and patient had an injury to the left lower extremity. Xrs were without e/o fracture. On 8/22, developed urgency for a bowel movement, was sitting up at side of bed and slid to the floor - reported pain in his left and right knees from hitting the ground, but no pain or discomfort in the left hip. Exam of left hip limited due to pain but no e/o trauma, left hip incision appears c/d/I. Neuro exam unremarkable. Right lower extremity exam unremarkable. The following day (8/23), patient reported no ongoing pain symptoms or concerns after his fall  - monitor for changing symptoms, need for imaging   - mental status stable, neuro exam non-focal, no need for imaging  - fall precautions    # Hypertension  - Cont PTA amlodipine 10 mg daily  - Initiated on carvedilol 6.25 mg twice daily    # Acute on chronic blood loss anemia   - The patient had three units of blood transfusion (last was 7/7)  - Continue oral iron, folic acid,  multivitamins and thiamine.   - Will obtain labs once every three days to minimize blood loss through phlebotomy.     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  - Continues on Apixaban 2.5 mg PO BID, resumed on 8/2 postoperatively     #Multifactorial encephalopathy - likely toxic metabolic, uremic, delirium, depression contributing, not clear if present on admission, resolved     #Acute Kidney Injury complicated by hypernatremia on top of chronic kidney disease stage I-II, unclear if present on admission  The patient was expected to have dialysis but however he did not require during this hospitalization.  Although line was placed it was removed without complications also during this hospitalization. Renal function slowly stabilizing with time.   - We are now trending BMP q72h.     #Asymptomatic bacteruria (VRE)  UA obtained on 7/19 to assess for casts, reflexed to culture which is growing VRE. Labs and vitals with low concern for new infection. Discussed with ID who recommended against treatment.    #Hypoxemic Respiratory Failure due to immobility, surgery, not clear if present on admission, resolved    #Constipation, resolved with the current regimen     #Hypomagnesemia  -monitor and replete as indicated    # Chronic medical problems:  L heel pressure wound, unstagable  We are following recommendations of wound nurse    #Type 2 diabetes mellitus  Being monitored off insulin     Severe malnutrition in the context of acute on chronic illness  Following dietitian recommendations     Adjustment disorder with depressed mood and prolonged grief disorder  Patient is declined psychotropic medications    Hyperlipidemia-continue PTA Lipitor 40 mg daily    JAIR-CPAP when sleeping       Diet: Diet  Regular Diet Adult    DVT Prophylaxis: DOAC  Tran Catheter: Not present  Lines: PRESENT      CVC Single Lumen Right Internal jugular Non - valved (open  "ended);Tunneled;Power injectable-Site Assessment: WDL      Cardiac Monitoring: None  Code Status: Full Code      Clinically Significant Risk Factors              # Hypoalbuminemia: Lowest albumin = 2.2 g/dL at 7/8/2023  8:51 AM, will monitor as appropriate            # Obesity: Estimated body mass index is 31.22 kg/m  as calculated from the following:    Height as of this encounter: 1.778 m (5' 10\").    Weight as of this encounter: 98.7 kg (217 lb 9.5 oz).   # Severe Malnutrition: based on nutrition assessment           Disposition Plan           Naveed Cobb MD  Hospitalist Service, GOLD TEAM 21  [facility]  Securely message with [employer] (more info)  Text page via [facility] Paging/Directory   See signed in provider for up to date coverage information  ______________________________________________________________________    Interval History   Doing okay today. Had a long chat w/ Don today about his various stressors. His major complaint is boredom, though when offered options for things to do (reading, audiobooks, podcasts, coloring), he declined. He wants to be outside gardening/taking care of activities, though I did try to redirect him to more feasible tasks. He is frustrated w/ lack of progress w/ therapy, but we talked about baby steps like getting up to the recliner and spending some time upright rather than just laying in bed.    Physical Exam   Vital Signs: Temp: 97.8  F (36.6  C) Temp src: Oral BP: (!) 148/69 Pulse: 71   Resp: 16 SpO2: 98 % O2 Device: None (Room air)    Weight: 217 lbs 9.5 oz    General Appearance: Elderly male, lying in bed, NAD, pleasant  Respiratory: Breathing comfortably on room air, lungs grossly CTAB  Cardiovascular: RRR, no m/r/g.  GI: soft, non-distended, non-tender to palpation  Skin: no skin rash, pallor present  Other: LLE ROM limited due to pain. Still w/ hypersensitivity of the foot and ankle.     Medical Decision Making       45 " MINUTES SPENT BY ME on the date of service doing chart review, history, exam, documentation & further activities per the note.      Data         Imaging results reviewed over the past 24 hrs:   No results found for this or any previous visit (from the past 24 hour(s)).

## 2023-08-26 NOTE — PROGRESS NOTES
"  VS: BP (!) 148/69 (BP Location: Left arm)   Pulse 71   Temp 97.8  F (36.6  C) (Oral)   Resp 16   Ht 1.778 m (5' 10\")   Wt 98.7 kg (217 lb 9.5 oz)   SpO2 98%   BMI 31.22 kg/m      O2: Room air saturations 98%. Pt does not want to complete the IS   Output: Pt is using urinal appropriately. Voiding clear mecca urine   Last BM: Pt reports\" I had a BM yesterday\"   Activity: Pt prefers to stay laying on back and does not want staff to touch his left lower leg. Pt very reluctant to get up in chair or complete any hygiene cares    Skin: Pt reluctant to turn to side to allow staff to assess bottom. Pt has a few scabs on lower legs. Left heel is very tender to touch.    Pain: Pt is receiving Tylenol schedule.    CMS: intact   Dressing: Pt wanted both the heel meplix taken off to take a break from dressings. Elevated left leg on pillows so left heel does not touch bed.    Diet: Pt did not want to order breakfast or lunch today.    LDA: CVC right chest dressing is CDI   Equipment: Bariatric bed, lift room, urinal   Plan: Awaiting for placement   Additional Info: Pt very reluctant to cooperate with increasing activity today. Mg level was 1.5. Mg replacement orders were initiated. Status of patient will continue to be monitored.       "

## 2023-08-26 NOTE — PLAN OF CARE
Shift: 1500 - 1930    Vital Signs: Temp: 98  F (36.7  C) Temp src: Oral BP: 129/61 Pulse: 69   Resp: 18 SpO2: 98 % O2 Device: None (Room air)     CMS/Neuro: A&O x4 - calm & cooperative w/ cares.  Denies headache, dizziness, N/V     Cardio/Resp: No SOB and no chest pain.  No wheezing or crackles - Lung sounds clear bilaterally      Last BM: LBM: 8/25/23 - Per pt report  BS active & audible x4. Passing flatus, denies abdominal pain     Output: Voids spontaneously & adequate amounts.  Urinal used appropriately - Clear mecca urine     Activity: Pt refers to stay laying on back. Pt refused positioning and laying on side.      Skin: Visible skin Intact - Pt refused to turn to allow staff to assess.  L. Heel tender to the touch.  Couple of scabs on lower legs     Pain: Denied pain and the need for pain medication     LDA: CVC R. Chest - Patent and currently heparin locked.     Diet: Regular, Thin liquids, Medication whole.  Pt was eating YEVVO when writer went into pts room.      Additional Info: Call light w/in reach and able to make needs known.  Mg replacement finished during shift. Recheck scheduled for 1939  Pt mood has improved as the shift went on  Contact precautions maintained - Continue POC     Plan: Awaiting for placement

## 2023-08-26 NOTE — PLAN OF CARE
"  VS: /75 (BP Location: Left arm, Patient Position: Semi-Fuentes's, Cuff Size: Adult Regular)   Pulse 72   Temp 97.4  F (36.3  C) (Oral)   Resp 16   Ht 1.778 m (5' 10\")   Wt 98.7 kg (217 lb 9.5 oz)   SpO2 99%   BMI 31.22 kg/m      O2: Stable on room air>95%. Denies SOB or CP   Output: Using urinal at bedside   Last BM: 8/25/23 per PT report   Activity: Not OOB  Assist of 1 with cares, assist of 2 transfer. Lift device   Skin: Left heel wound, scabs to bilat shin, redness to Buttocks   Pain: Managed with scheduled tylenol and robaxin   CMS: AXO,4,no N/T   Dressing: L heel wound- CDI   Diet: Reg diet   LDA: Right CVC chest port- hep locked   Equipment: Pt belongings, Rookie boots, IV pole and pump   Plan: Awaiting Discharge to  TCU. SW sent referrals  Count with POC    Additional Info:                           "

## 2023-08-27 NOTE — PLAN OF CARE
"VS: /56 (BP Location: Left arm)   Pulse 65   Temp 98.3  F (36.8  C) (Oral)   Resp 18   Ht 1.778 m (5' 10\")   Wt 98.7 kg (217 lb 9.5 oz)   SpO2 98%   BMI 31.22 kg/m      O2: O2 SATS >90% denies chest pain,  or SBO   Output: Voids adequately using urinal   Last BM: 8/25/23 BS present all x4 quadrants    Activity: Not OOB this shift AO2  with cares only  allowed one time repositioning A-2 with lift device    Up for meals? N/A   Skin: L hip surgical incision,scalling/ scab to bilateral lower foot, readiness to coccyx barrier  cream applied wound to left heel ,   Pain: Pain managed with prn oxycodone    CMS: Aox4  denies numbness and tingling   Dressing: CDI    Diet: Regular diet diabetic with BG checks and sliding scale    LDA: Right CVC, Heparin locked.    Equipment: IV Pole,  celling  lift personal belongings   Plan: Continue with plan of care                                "

## 2023-08-27 NOTE — PLAN OF CARE
"  VS: /65 (BP Location: Left arm, Patient Position: Semi-Fuentes's, Cuff Size: Adult Regular)   Pulse 73   Temp 98  F (36.7  C) (Oral)   Resp 16   Ht 1.778 m (5' 10\")   Wt 98.7 kg (217 lb 9.5 oz)   SpO2 97%   BMI 31.22 kg/m       O2: VSS on RA, denies SOB   Output: Voids without difficulty into urinal    Last BM: 8/25/23, BS present in all 4 quadrants    Activity: Not OOB this shift. Pt refused repositioning, L heel floated    Skin: L heel pressure wound, scabs on lower legs    Pain: Pt rated pain as a 10 in back, L ankle, and L foot, scheduled tylenol given, lidocaine patch in place on L foot, and heat packs applied to neck.    CMS: Intact, flat affect    Dressing: Mepilex on R heel   Diet: Regular, no appetite. Meals encouraged.    LDA: Central line dressing and cap changed today.    Equipment: IV pole, personal belongings    Plan: Awaiting TCU placement    Additional Info:            "

## 2023-08-27 NOTE — PROGRESS NOTES
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 Meyers and found to have MRSA bacteremia now on prolonged oral antibiotic therapy to complete treatment course. Stable and awaiting discharge to TCU at this time.    Plan for today   - Awaiting for TCU bed  - Continue current care    - Labs yesterday show consistently improving inflammatory markers  - Added Gabapentin for neuropathic pain in left ankle/foot   - Will touch base with Ortho team tomorrow regarding re-imaging hip    # MRSA bacteremia due to indwelling drain complicated by left hip septic arthritis s/p debridement and antibiotic and implant retention on 6/21/23, all are POA  Blood cultures: 6/14, 6/15 + for MRSA (4/4), and 6/16 (1/2 GPCs) and 6/17 (2/2) GPCs. - started vancomycin 6/15.   6/17 sinovial fluid cultures with staph and klebsiella. Purulent drainage noted at incision site at TCU, CT left hip concerning for infection.KEILA did not show endocarditis.  The patient received ceftazidime and vancomycin for management of corynebacterium, Pseudomonas, and MRSA during this hospitalization.  - Continue WBAT LLE     - Posterior hip precautions x3 months (no flexion beyond 90 degrees, adduction beyond midline, internal rotation beyond midline)   - Will touch base w/ Ortho regarding re-imaging hip/re-evaluation tomorrow  - Antimicrobial therapy:   - Ceftazidime (6/20-8/1)   - Vancomycin (6/20-8/1)   - Ciprofloxacin (8/2-)   - Doxycycline (8/2-)   - Continue PO abx through 9/13 to  complete total 12 week course  -ID was following, reengaged on 8/24, recommended continuing current plan of abx through 9/13, will re-engage w/ ID team if still inpt to determine need for longer abx course   - Weekly ESR/CRP to monitor response to therapy, continue to downtrend   -The patient was seen in consultation with prosthetics and infectious disease    # Neck pain secondary to multilevel cervical degenerative changes greatest at C6 and C7  # Left foot pain secondary to significant degenerative changes and unstageable left heel ulcer, follow-up.  Patient was ruled out for fracture and osteomyelitis given his intermediate high pretest probability with change of mental status and MRSA bacteremia using advanced imaging with MRI especially in setting of worsening neck and foot pain.    - Pain control: acetaminophen 1 g every 8 hours, diclofenac 2 g 4 times daily, lidocaine patch, Robaxin 500 mg at bedtime  - Added pain control w/ PRN Oxycodone particularly for prior to working with therapy; encouraged patient to discuss pain control with the team so we can more effectively manage it for him  - Added Gabapentin 300 mg at bedtime on 8/27 to help with LLE neuropathic pain    #Unwitnessed fall  First episode occurred overnight 8/12 and patient had an injury to the left lower extremity. XR without e/o fracture. On 8/22, developed urgency for a bowel movement, was sitting up at side of bed and slid to the floor - reported pain in his left and right knees from hitting the ground, but no pain or discomfort in the left hip. Exam of left hip limited due to pain but no e/o trauma, left hip incision appears c/d/I. Neuro exam unremarkable. Right lower extremity exam unremarkable. The following day (8/23), patient reported no ongoing pain symptoms or concerns after his fall  - monitor for changing symptoms, need for imaging   - mental status stable, neuro exam non-focal, no need for imaging  - fall precautions    #  Hypertension  - Cont PTA amlodipine 10 mg daily  - Initiated on carvedilol 6.25 mg twice daily    # Acute on chronic blood loss anemia   - The patient had three units of blood transfusion (last was 7/7)  - Continue oral iron, folic acid, multivitamins and thiamine.   - Will obtain labs once every three days to minimize blood loss through phlebotomy.     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  - Continues on Apixaban 2.5 mg PO BID, resumed on 8/2 postoperatively     #Multifactorial encephalopathy - likely toxic metabolic, uremic, delirium, depression contributing, not clear if present on admission, resolved     #Acute Kidney Injury complicated by hypernatremia on top of chronic kidney disease stage I-II, unclear if present on admission  The patient was expected to have dialysis but however he did not require during this hospitalization.  Although line was placed it was removed without complications also during this hospitalization. Renal function slowly stabilizing with time.   - We are now trending BMP q72h.     #Asymptomatic bacteruria (VRE)  UA obtained on 7/19 to assess for casts, reflexed to culture which is growing VRE. Labs and vitals with low concern for new infection. Discussed with ID who recommended against treatment.    #Hypoxemic Respiratory Failure due to immobility, surgery, not clear if present on admission, resolved    #Constipation, resolved with the current regimen     #Hypomagnesemia  -monitor and replete as indicated    # Chronic medical problems:  L heel pressure wound, unstagable  We are following recommendations of wound nurse    #Type 2 diabetes mellitus  Being monitored off insulin     Severe malnutrition in the context of acute on chronic illness  Following dietitian recommendations     Adjustment disorder with depressed mood and prolonged grief disorder  Patient is declined psychotropic medications    Hyperlipidemia-continue  "PTA Lipitor 40 mg daily    JAIR-CPAP when sleeping       Diet: Diet  Regular Diet Adult    DVT Prophylaxis: DOAC  Tran Catheter: Not present  Lines: PRESENT      CVC Single Lumen Right Internal jugular Non - valved (open ended);Tunneled;Power injectable-Site Assessment: WDL      Cardiac Monitoring: None  Code Status: Full Code      Clinically Significant Risk Factors            # Hypomagnesemia: Lowest Mg = 1.5 mg/dL in last 2 days, will replace as needed   # Hypoalbuminemia: Lowest albumin = 2.2 g/dL at 7/8/2023  8:51 AM, will monitor as appropriate            # Obesity: Estimated body mass index is 31.22 kg/m  as calculated from the following:    Height as of this encounter: 1.778 m (5' 10\").    Weight as of this encounter: 98.7 kg (217 lb 9.5 oz).   # Severe Malnutrition: based on nutrition assessment           Disposition Plan           Naveed Cobb MD  Hospitalist Service, GOLD TEAM 21  [facility]  Securely message with [employer] (more info)  Text page via [facility] Paging/Directory   See signed in provider for up to date coverage information  ______________________________________________________________________    Interval History   Feeling OK today. Girlfriend ordered him some [employer] yesterday which was good. Spent about 45 mins in the recliner but then developed pain in his buttocks and needed to transfer back to bed. Has noticed some progression in strength in his left leg, but still frustrated at his current limitations. Pain still persists in his left ankle and heel, described as \"burning\" and \"tingling\" in nature.     Physical Exam   Vital Signs: Temp: 98.3  F (36.8  C) Temp src: Oral BP: 122/56 Pulse: 65   Resp: 18 SpO2: 98 % O2 Device: None (Room air)    Weight: 217 lbs 9.5 oz    General Appearance: Appears older than stated age, no acute distress  Respiratory: Breathing comfortably on room air, lungs grossly CTAB  Cardiovascular: RRR, no m/r/g.  GI: " soft, non-distended, non-tender to palpation  Skin: pallor present, no skin rash  Other: Surgical site along left hip appears c/d/I. Limited strength w/ left hip flexion, has some strength with plantar/dorsiflexion. Left heel very sensitive to even light palpation.    Medical Decision Making       45 MINUTES SPENT BY ME on the date of service doing chart review, history, exam, documentation & further activities per the note.      Data     I have personally reviewed the following data over the past 24 hrs:    N/A  \   N/A   / N/A     N/A N/A N/A /  103 (H)   4.2 N/A N/A \       Imaging results reviewed over the past 24 hrs:   No results found for this or any previous visit (from the past 24 hour(s)).

## 2023-08-28 NOTE — PROGRESS NOTES
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 Meyers and found to have MRSA bacteremia now on prolonged oral antibiotic therapy to complete treatment course. Stable and awaiting discharge to TCU at this time.    Plan for today   - Awaiting for TCU bed  - Continue current care     - Will obtain Ortho input regarding re-imaging/reevaluation of left hip given prolonged stay  - Started Gabapentin o/n on 8/27 for neuropathic pain, may need to adjust dose or space out throughout day instead     # MRSA bacteremia due to indwelling drain complicated by left hip septic arthritis s/p debridement and antibiotic and implant retention on 6/21/23, all are POA  Blood cultures: 6/14, 6/15 + for MRSA (4/4), and 6/16 (1/2 GPCs) and 6/17 (2/2) GPCs. - started vancomycin 6/15.   6/17 sinovial fluid cultures with staph and klebsiella. Purulent drainage noted at incision site at TCU, CT left hip concerning for infection.KEILA did not show endocarditis.  The patient received ceftazidime and vancomycin for management of corynebacterium, Pseudomonas, and MRSA during this hospitalization.  - Continue WBAT LLE     - Posterior hip precautions x3 months (no flexion beyond 90 degrees, adduction beyond midline, internal rotation beyond midline)   - Will touch base w/ Ortho regarding re-imaging hip/re-evaluation today  - Antimicrobial therapy:   - Ceftazidime (6/20-8/1)   - Vancomycin (6/20-8/1)   - Ciprofloxacin (8/2-)   - Doxycycline (8/2-)   - Continue PO abx  through 9/13 to complete total 12 week course  -ID was following, reengaged on 8/24, recommended continuing current plan of abx through 9/13, will re-engage w/ ID team if still inpt to determine need for longer abx course   - Weekly ESR/CRP to monitor response to therapy, continues to downtrend     # Neck pain secondary to multilevel cervical degenerative changes greatest at C6 and C7  # Left foot pain secondary to significant degenerative changes and unstageable left heel ulcer, follow-up.  Patient was ruled out for fracture and osteomyelitis with MRI. Continues to report chronic pain mostly in his neck and left leg, worsens with therapy and limits his ability to participate in rehab efforts.  - Pain control: acetaminophen 1 g every 8 hours, diclofenac 2 g 4 times daily, lidocaine patch, Robaxin 500 mg at bedtime  - Added pain control w/ PRN Oxycodone particularly for prior to working with therapy  - Added Gabapentin 300 mg at bedtime on 8/27 to help with LLE neuropathic pain    #Unwitnessed fall  First episode occurred overnight 8/12 and patient had an injury to the left lower extremity. XR without e/o fracture. On 8/22, developed urgency for a bowel movement, was sitting up at side of bed and slid to the floor - reported pain in his left and right knees from hitting the ground, but no pain or discomfort in the left hip. Exam of left hip limited due to pain but no e/o trauma, left hip incision appears c/d/I. Neuro exam unremarkable. Right lower extremity exam unremarkable. The following day (8/23), patient reported no ongoing pain symptoms or concerns after his fall  - monitor for changing symptoms, need for imaging   - mental status stable, neuro exam non-focal, no need for imaging  - fall precautions    # Hypertension  - Cont PTA amlodipine 10 mg daily  - Initiated on carvedilol 6.25 mg twice daily    # Acute on chronic blood loss anemia   - The patient had three units of blood transfusion (last was 7/7)  -  Continue oral iron, folic acid, multivitamins and thiamine.   - Will obtain labs once every three days to minimize blood loss through phlebotomy.     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  - Continues on Apixaban 2.5 mg PO BID, resumed on 8/2 postoperatively     #Multifactorial encephalopathy - likely toxic metabolic, uremic, delirium, depression contributing, not clear if present on admission, resolved     #Acute Kidney Injury complicated by hypernatremia on top of chronic kidney disease stage I-II, unclear if present on admission  The patient was expected to have dialysis but however he did not require during this hospitalization.  Although line was placed it was removed without complications also during this hospitalization. Renal function slowly stabilizing with time.   - We are now trending BMP q72h.     #Asymptomatic bacteruria (VRE)  UA obtained on 7/19 to assess for casts, reflexed to culture which is growing VRE. Labs and vitals with low concern for new infection. Discussed with ID who recommended against treatment.    #Hypoxemic Respiratory Failure due to immobility, surgery, not clear if present on admission, resolved    #Constipation, resolved with the current regimen     #Hypomagnesemia  -monitor and replete as indicated    # Chronic medical problems:  L heel pressure wound, unstagable  We are following recommendations of wound nurse    #Type 2 diabetes mellitus  Being monitored off insulin     Severe malnutrition in the context of acute on chronic illness  Following dietitian recommendations     Adjustment disorder with depressed mood and prolonged grief disorder  Patient is declined psychotropic medications    Hyperlipidemia-continue PTA Lipitor 40 mg daily    JAIR-CPAP when sleeping       Diet: Diet  Regular Diet Adult    DVT Prophylaxis: DOAC  Tran Catheter: Not present  Lines: PRESENT      CVC Single Lumen Right Internal jugular Non  "- valved (open ended);Tunneled;Power injectable-Site Assessment: WDL      Cardiac Monitoring: None  Code Status: Full Code      Clinically Significant Risk Factors            # Hypomagnesemia: Lowest Mg = 1.5 mg/dL in last 2 days, will replace as needed   # Hypoalbuminemia: Lowest albumin = 2.2 g/dL at 7/8/2023  8:51 AM, will monitor as appropriate            # Obesity: Estimated body mass index is 31.22 kg/m  as calculated from the following:    Height as of this encounter: 1.778 m (5' 10\").    Weight as of this encounter: 98.7 kg (217 lb 9.5 oz).   # Severe Malnutrition: based on nutrition assessment           Disposition Plan           Naveed Cobb MD  Hospitalist Service, GOLD TEAM 21  [facility]  Securely message with [employer] (more info)  Text page via [facility] Paging/Directory   See signed in provider for up to date coverage information  ______________________________________________________________________    Interval History   Sleepy this morning. Reported some pain in his left ankle - wonders what this is from and whether an x-ray would be helpful. Denies any injury or trauma to the leg, hasn't fallen. In fact, didn't get out of bed yesterday due to \"didn't feel like it.\" Otherwise no chest pain, shortness of breath, or fevers/chills. He has no further concerns at this time.    Physical Exam   Vital Signs: Temp: 98  F (36.7  C) Temp src: Oral BP: 121/68 Pulse: 65   Resp: 18 SpO2: 95 % O2 Device: None (Room air)    Weight: 217 lbs 9.5 oz    General Appearance: Chronically ill appearing male, appears older than stated age, sleeping but arouses easily to voice  Respiratory: Breathing comfortable on room air, lungs grossly CTAB  Cardiovascular: RRR, no m/r/g.  GI: soft, non-distended, non-tender to palpation  Skin: pallor present, no skin rash  Other: Left hip site appears c/d/I. Continues with hypersensitivity to even light palpation over the lateral malleolus " on the left ankle, as well as diffusely along the plantar surface of the foot.     Medical Decision Making       45 MINUTES SPENT BY ME on the date of service doing chart review, history, exam, documentation & further activities per the note.      Data     I have personally reviewed the following data over the past 24 hrs:    N/A  \   N/A   / N/A     N/A N/A N/A /  106 (H)   4.5 N/A 2.04 (H) \       Imaging results reviewed over the past 24 hrs:   No results found for this or any previous visit (from the past 24 hour(s)).

## 2023-08-28 NOTE — PROGRESS NOTES
Pt is discharging to [facility], which he has been to before. Report given to nurse. Pt here for complications with L BELLA. Also diabetic. Lift Ax2. On contact precautions for MRSA. Regular diet. Not in therapies. On hip precautions, weight bearing as tolerated but very unmotivated. Pt sent via transport in bed as he has bariatric bed and pulsate mattress. Belongings sent with patient. No PRNs given today. Incontinent. VS WNL.

## 2023-08-28 NOTE — TELEPHONE ENCOUNTER
DR. Meyers is out in about 1 month. Apt scheduled with Dr. Meyers's PACurt. Pt is still in-patient. Apt will appear on discharge paper.        -TRUONG Donald- Orthopedics      ----- Message from Gypsy Bruce PA-C sent at 8/28/2023  2:44 PM CDT -----  Regarding: fu with Luigi  Can you please schedule this patient with Luigi in a bout a month?    Thanks!   Gypsy

## 2023-08-28 NOTE — PLAN OF CARE
Patient is a 72 year old male  admitted to room 410 via bed.  Patient is alert and oriented X 4. See Epic for VS and assessment.  Patient is able to transfer  using full lift. Patient was settled into their room, shown call light, tv, mealtimes etc. Oriented to unit. Will continue monitoring pain level and VS. Notifying MD with any concerns.  Follow MD orders for cares and medications.    Flu Vaccine - do they want the flu vaccine, if applicable? ____(If flu vaccine is due, pls ask patient if they would be interested in the flu vaccine and follow the prompts to the left of the chart)    COVID Vaccine: __ of 5 (Typically, patients should have four COVID vaccines plus the Bivalent booster. Under immunizations, please verify where the patient falls and offer the COVID booster, if applicable. If patient is interested, please add on sticky for provider to order. If declines, please add in comments.) Comments:        Level of Schooling:high school  Ethnicity:  Marital Status:  Dentures: No  Hearing Aid: No  Smoker:  No  Glasses: Yes  Occupation: retired  Falls 0-1 mo: 0 2-6 mo: 2  Stairs prior function: Dependent  Prior device use: Other 0    Advanced Care Directive Referral to Social Work?No    Pt arrived in the unit around 1630H, on his bed. A&O X4, able to make needs known. On regular diet, thin liquids. Transfers with Liko lift assist of 2. Denied any pain. Scheduled meds given per order. Will continue with POC.

## 2023-08-28 NOTE — PLAN OF CARE
Occupational Therapy Discharge Summary    Reason for therapy discharge:    Discharged to transitional care facility.    Progress towards therapy goal(s). See goals on Care Plan in [facility] electronic health record for goal details.  Goals not met.  Barriers to achieving goals:   limited tolerance for therapy and discharge from facility.    Therapy recommendation(s):    Continued therapy is recommended.  Rationale/Recommendations:  recommend continued OT at TCU to maximize IND with ADL and functional transfers.

## 2023-08-28 NOTE — PROGRESS NOTES
SW sent email asking for deposit to be made today so pt can come over.     NEREYDA Sharp   [facility], Transitional Care Unit   Social Work   [facility] [place] [address], [address]  [place], [place] [address] (ph) [phone]

## 2023-08-28 NOTE — PROGRESS NOTES
8/28/23    Care Management Follow Up    CHW completed PAS for pt going to  TCU. [id number]    Enid Isaacs  Inpatient CHW  [facility] 5 Ortho, 8 Med Surge, & ED  PH: [phone]

## 2023-08-28 NOTE — PROGRESS NOTES
VS: VSS. Denies CP/SOB. A/O x4.   O2: >90% on RA    Output: Voiding adequate amounts w/o pain or difficulty    Activity: Not OOB this shift, pt refused to be repositioned, after multiple attempts. Educated but still refused    Skin: L heel wound   Pain: Denies    CMS: Intact    Dressing: CDI    Diet: Regular, tolerating well.    LDA: CVC heparin locked, dressing changed today    Equipment: Personal belongings, bariatric bed   Plan: Awaiting placement    Additional Info:

## 2023-08-28 NOTE — PLAN OF CARE
"  VS: /65   Pulse 72   Temp 98  F (36.7  C) (Oral)   Resp 18   Ht 1.778 m (5' 10\")   Wt 98.7 kg (217 lb 9.5 oz)   SpO2 95%   BMI 31.22 kg/m       O2: Room air   Output: incontinent   Last BM: 8/25/2023   Activity: Assistive devices   Skin: L heel wound, redness to buttock/coccyx   Pain: LLE   CMS: intact   Dressing: Coccyx/L heel   Diet: Regular, poor intacke   LDA: Right chest port   Equipment: Lift   Plan: Discharge to TCU   Additional Info: Patient is resting well this AM. Brother to visit. Refusing some medications. Declined AM meal. Patient agreeable to move to TCU today.                             "

## 2023-08-28 NOTE — DISCHARGE SUMMARY
"[facility]  Hospitalist Discharge Summary      Date of Admission:  6/17/2023  Date of Discharge:  8/28/2023  Discharging Provider: Naveed Cobb MD  Discharge Service: Hospitalist Service, GOLD TEAM 21    Discharge Diagnoses   MRSA bacteremia due to indwelling drain complicated by left hip septic arthritis s/p debridement and antibiotic and implant retention on 6/21/23  Neck pain secondary to multilevel cervical degenerative changes greatest at C6 and C7  Left foot pain secondary to significant degenerative changes and unstageable left heel ulcer  Unwitnessed mechanical fall  HTN  Acute on chronic blood loss anemia  Hx of VTE  Acute toxic metabolic encephalopathy  Acute kidney injury c/b hypernatremia on CKDII  Asymptomatic bacteruria (VRE)   Hypoxemic Respiratory Failure   Constipation  Hypomagnesemia  T2DM  Severe malnutrition in the setting of acute on chronic illness  Adjustment disorder w/ depressed mood  HLD  JAIR on CPAP    Clinically Significant Risk Factors     # Obesity: Estimated body mass index is 31.22 kg/m  as calculated from the following:    Height as of this encounter: 1.778 m (5' 10\").    Weight as of this encounter: 98.7 kg (217 lb 9.5 oz).  # Severe Malnutrition: based on nutrition assessment      Follow-ups Needed After Discharge   Follow-up Appointments     Adult [facility] Follow-up and recommended labs and tests      Follow up with Infectious diseases at the [facility] on 8/21       Follow up with Orthopedics, you will get a call for follow up     Appointments on [place] and/or [facility] (with [facility] or [facility]   provider or service). Call [phone] if you haven't heard regarding   these appointments within 7 days of discharge.            Unresulted Labs Ordered in the Past 30 Days of this Admission       No orders found from 5/18/2023 to 6/18/2023.        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 Meyers and found to have MRSA bacteremia now on prolonged oral antibiotic therapy to complete treatment course. Stable and awaiting discharge to TCU at this time.    MRSA bacteremia due to indwelling drain complicated by left hip septic arthritis s/p debridement and antibiotic and implant retention on 6/21/23, all are POA  Blood cultures: 6/14, 6/15 + for MRSA (4/4), and 6/16 (1/2 GPCs) and 6/17 (2/2) GPCs. - started vancomycin 6/15.   6/17 synovial fluid cultures with staph and klebsiella. Purulent drainage noted at incision site at TCU with CT of left hip concerning for infection. KEILA did not show endocarditis.  The patient received ceftazidime and vancomycin for management of corynebacterium, Pseudomonas, and MRSA on cultures during this hospitalization. ID re-evaluated patient on 8/24 and recommended continuing PO Ciprofloxacin and Doxycycline through 9/13 to complete a total of 12 weeks of antibiotics for PJI. If patient remains in TCU at that time, would recommend re-engaging with ID team regarding prolonged course of PO abx. They also recommended weekly trending CRP and ESR to monitor response to therapy.    Orthopedics team was consulted prior to transfer to TCU and awaiting input from Dr. Meyers's team regarding timing of follow-up and need for any additional imaging/work-up. Continue posterior hip precautions x3 months (through 9/21) which means: no flexion beyond 90 degrees, adduction beyond midline, internal rotation beyond midline. Will continue working with  PT at OT at TCU to improve functional status and mobility.     Neck pain secondary to multilevel cervical degenerative changes greatest at C6 and C7  Left foot pain secondary to significant degenerative changes and unstageable left heel ulcer, follow-up.  Patient was ruled out for fracture and osteomyelitis with MRI. Continues to report chronic pain mostly in his neck and left leg, worsens with therapy and limits his ability to participate in rehab efforts.  - Pain control: acetaminophen 1 g every 8 hours, diclofenac 2 g 4 times daily, lidocaine patch, Robaxin 500 mg at bedtime  - Added pain control w/ PRN Oxycodone particularly for prior to working with therapy  - Added Gabapentin 300 mg at bedtime on 8/27 -> of note this caused significant sleepiness on 8/28, would recommend restarting at 100 mg dose at bedtime and titrating from there.     Hypertension  - Cont PTA amlodipine 10 mg daily  - Initiated on carvedilol 6.25 mg twice daily for BP control as well  - BP has remained at goal on this regimen.    Acute on chronic blood loss anemia   The patient had three units of blood transfusion (last was 7/7)  - Continue oral iron, folic acid, multivitamins and thiamine.   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  - Continues on Apixaban 2.5 mg PO BID, resumed on 8/2 postoperatively     Chronic medical problems:  L heel pressure wound, unstagable  WOCN assisted with management    Type 2 diabetes mellitus  Being monitored off insulin, Bgs are stable      Severe malnutrition in the context of acute on chronic illness  Nutrition consulted, assisted with appropriate supplements      Adjustment disorder with depressed mood and prolonged grief disorder  Patient has declined psychotropic medications on multiple  conversations    Hyperlipidemia  Continue PTA Lipitor 40 mg daily    JAIR  CPAP when sleeping    Resolved hospital problems:  Multifactorial encephalopathy   likely toxic metabolic, uremic, delirium, depression contributing  Acute Kidney Injury complicated by hypernatremia on top of chronic kidney disease stage I-II, unclear if present on admission  The patient was expected to have dialysis but however he did not require during this hospitalization.  Although line was placed it was removed without complications also during this hospitalization. Renal function slowly stabilizing with time. Cr remains at 2.0. likely representing his new baseline.   Asymptomatic bacteruria (VRE)  UA obtained on 7/19 to assess for casts, reflexed to culture which is growing VRE. Labs and vitals with low concern for new infection. Discussed with ID who recommended against treatment.   Hypoxemic Respiratory Failure due to immobility, surgery, resolved  Constipation, resolved with the current regimen  Hypomagnesemia, resolved    Consultations This Hospital Stay   NUTRITION SERVICES ADULT IP CONSULT  PHYSICAL THERAPY ADULT IP CONSULT  OCCUPATIONAL THERAPY ADULT IP CONSULT  PHARMACY TO DOSE VANCO  INFECTIOUS DISEASE GENERAL ADULT IP CONSULT  ORTHOPAEDIC SURGERY ADULT/PEDS IP CONSULT  NURSING TO CONSULT FOR VASCULAR ACCESS CARE IP CONSULT  CARE MANAGEMENT / SOCIAL WORK IP CONSULT  WOUND OSTOMY CONTINENCE NURSE  IP CONSULT  VASCULAR ACCESS ADULT IP CONSULT  NEPHROLOGY GENERAL ADULT IP CONSULT  VASCULAR ACCESS ADULT IP CONSULT  INTERVENTIONAL RADIOLOGY ADULT/PEDS IP CONSULT  PALLIATIVE CARE ADULT IP CONSULT  PSYCHIATRY IP CONSULT  NEPHROLOGY GENERAL ADULT IP CONSULT  NUTRITION SERVICES ADULT IP CONSULT  NEUROLOGY GENERAL ADULT IP CONSULT  PALLIATIVE CARE ADULT IP CONSULT  PSYCHIATRY IP CONSULT  WOUND OSTOMY CONTINENCE NURSE  IP CONSULT  OCCUPATIONAL THERAPY ADULT IP CONSULT  INFECTIOUS DISEASE [facility] ADULT IP CONSULT  INTERVENTIONAL RADIOLOGY  ADULT/PEDS IP CONSULT  INTERVENTIONAL RADIOLOGY ADULT/PEDS IP CONSULT  PSYCHIATRY IP CONSULT  NEUROLOGY GENERAL ADULT IP CONSULT  NUTRITION SERVICES ADULT IP CONSULT  PHYSICAL THERAPY ADULT IP CONSULT  OCCUPATIONAL THERAPY ADULT IP CONSULT  PSYCHIATRY IP CONSULT  PSYCHIATRY IP CONSULT  INTERVENTIONAL RADIOLOGY ADULT/PEDS IP CONSULT  PHARMACY IP CONSULT  PHYSICAL THERAPY ADULT IP CONSULT  ORTHOPAEDIC SURGERY ADULT/PEDS IP CONSULT  PHYSICAL THERAPY ADULT IP CONSULT  INFECTIOUS DISEASE [facility] ADULT IP CONSULT  ORTHOPAEDIC SURGERY ADULT/PEDS IP CONSULT    Code Status   Full Code    Time Spent on this Encounter   Naveed WALLACE MD, personally saw the patient today and spent greater than 30 minutes discharging this patient.       Naveed Cobb MD  [facility] MED SURG ORTHOPEDIC  [facility] [address]  Phone: [phone]  Fax: [phone]  ______________________________________________________________________    Physical Exam   Vital Signs: Temp: 98  F (36.7  C) Temp src: Oral BP: 123/65 Pulse: 72   Resp: 18 SpO2: 95 % O2 Device: None (Room air)    Weight: 217 lbs 9.5 oz  General Appearance: Elderly male, appears older than stated age, quite frail appearing, sleepy but arouses to voice  Respiratory: Breathing comfortable on room air, lungs grossly CTAB  Cardiovascular: RRR, no m/r/g.  GI: soft, non-distended, non-tender to palpation  Skin: pallor present, no skin rash  Other: Trace LE edema bilaterally.  Left hip site appears c/d/I. Continues with hypersensitivity to even light palpation over the lateral malleolus on the left ankle, as well as diffusely along the plantar surface of the foot.          Primary Care Physician   YUE MEDLEY    Discharge Orders      Palliative Care Referral      Infectious Disease Referral      General info for SNF    Length of Stay Estimate: Short Term Care: Estimated # of Days <30  Condition at Discharge: Improving  Level of  care:skilled   Rehabilitation Potential: Good  Admission H&P remains valid and up-to-date: Yes  Recent Chemotherapy: N/A  Use Nursing Home Standing Orders: Yes     Mantoux instructions    Give two-step Mantoux (PPD) Per Facility Policy Yes     Reason for your hospital stay    You were admitted for MRSA bacteremia and osteomyelitis (bone infection), will need rehab and ongoing oral antibiotics.     Activity - Up with nursing assistance     Adult [facility] Follow-up and recommended labs and tests    Follow up with Infectious diseases at the [facility] on 8/21     Follow up with Orthopedics, you will get a call for follow up     Appointments on [place] and/or [facility] (with [facility] or [facility] provider or service). Call [phone] if you haven't heard regarding these appointments within 7 days of discharge.     Full Code     Physical Therapy Adult Consult    Evaluate and treat as clinically indicated.    Reason:  Debility     Diet    Follow this diet upon discharge: Orders Placed This Encounter      Calorie Counts      Renal Diet (non-dialysis)       Significant Results and Procedures   Results for orders placed or performed during the hospital encounter of 06/17/23   XR Pelvis w Hip Left 1 View    Narrative    Single views pelvis radiograph(s) 6/19/2023 11:07 AM    History: Hx of multiple prosthetic hip infections, currently with MRSA  bacteremia    Comparison: 6/10/2023    Findings:    AP view(s) of the pelvis and left hip were obtained.     Post surgical changes of left total hip arthroplasty revision with  longstem component in the femur. Surgical drain in projects over the  left hip. Small amount of subcutaneous emphysema projecting over the  joint space. No periprosthetic lucencies or evidence of hardware  complication. No new acute osseous abnormality. Healing proximal  femoral osteotomy.    Sacrum and innominate bones are partially obscured by overlying bowel  gas/fecal content.    Pelvic  phlebolith.    Diffuse osteopenia.      Impression    Impression:  1. Stable post surgical changes of left total hip arthroplasty  revision. No evidence of hardware failure. Ongoing healing of proximal  left femoral osteotomy.  2. Subcutaneous emphysema projecting over the joint space may be  secondary to surgical drain versus infection.    I have personally reviewed the examination and initial interpretation  and I agree with the findings.    GERI LOYOLA MD (Joe)         SYSTEM ID:  [id number]   CT Hip Left w/o Contrast    Narrative    EXAM: CT HIP LEFT W/O CONTRAST  6/19/2023 11:32 AM      HISTORY: assess for abscess; Hip pain; Infection, known or r/o; Hip  x-ray result available; No known/automatically detected potential  contraindications to imaging    COMPARISON: Radiographs same-day, CT 7/6/2018     TECHNIQUE: CT imaging of the left hip without IV contrast. Multiplanar  reconstructions.    FINDINGS:      images: Revision total left hip arthroplasty. Left-sided  surgical drain.    Postsurgical changes of revision total left hip arthroplasty and  proximal femoral osteotomy the distal extent of which is excluded from  provided field-of-view. Possible femoral cerclage wire. No evidence of  hardware complication.    Expansile lytic lesion in the medial left ilium today measuring up to  3.6 x 5.6 cm in the axial plane, previously 3.4 x 5.2 cm on CT  7/6/2018. Areas of associated cortical thinning and possible  dehiscence, likely progressed compared to prior outside CT 10/25/2022  (series 3 image 23, for example).    Left-sided percutaneous drainage catheter terminates posterior lateral  to the posterior superior acetabulum. Scattered foci of subcutaneous  gas about the left hip, especially anteriorly. Soft tissue density  throughout this region. Assessment of discrete fluid collection  difficult on this noncontrast study compromised by metallic beam  hardening artifact. Partially visualized fluid  collection  superficial-medial to the proximal adductor longus today measuring up  to 3.0 x 6.7 cm in the axial plane, previously 5.5 x 2.1 cm on  10/25/2022. The caudal extent is incompletely imaged. Soft tissue  stranding on the lateral aspect of the hip extending through the  overlying gluteal musculature.    Trace free fluid in the pelvis. Vascular calcifications.       Impression    IMPRESSION:     1. Soft tissue stranding and multiple foci of subcutaneous emphysema  about the left hip, especially anteriorly. Subcutaneous gas may be  related to percutaneous drain however infection not excluded by  imaging.  *  Partially visualized fluid collection within subcutaneous fat  superficial-medial to the proximal adductor longus (caudal extent of  which is incompletely visualized) which appears enlarged compared to  outside CT 10/25/2022. Infection/abscess not excluded.    2. Revision total left hip arthroplasty without evidence of hardware  complication. Healing proximal femoral osteotomy.    3. Expansile, predominantly lytic lesion of the posterior medial left  ilium which is minimally enlarged compared to CT 7/6/2018.    GERI LOYOLA MD (Joe)         SYSTEM ID:  [id number]   US Renal Complete Non-Vascular    Narrative    EXAMINATION: US RENAL COMPLETE NON-VASCULAR, 6/23/2023 3:10 PM     COMPARISON: Pelvic CT 7/6/2018    HISTORY: HALEY    TECHNIQUE: The kidneys and bladder were scanned in the standard  fashion with specialized ultrasound transducer(s) using both gray  scale and limited color/spectral Doppler techniques.    FINDINGS:    Right kidney: Measures 11.4 cm in length. No focal mass. No  hydronephrosis.    Left kidney: Measures 11.6 cm in length. No focal mass. No  hydronephrosis. Difficult visualization of the left kidney    Bladder: Partially decompressed. Diffuse wall thickening of the  urinary bladder, likely due to partial decompression.      Impression    IMPRESSION:    1. No hydronephrosis.  2.  Apparent urinary bladder wall thickening favor secondary to  decompression.    I have personally reviewed the examination and initial interpretation  and I agree with the findings.    MILIND QUINTERO MD         SYSTEM ID:  [id number]   IR CVC Tunnel Placement > 5 Yrs of Age    Narrative    PROCEDURE: Tunneled central venous catheter placement     Procedural Personnel  Advanced practice provider(s): Sher Modi PA-C    Procedure Date (mm/dd/yyyy): 6/27/2023    Pre-procedure diagnosis: Infection of prosthetic joint, subsequent  encounter  Post-procedure diagnosis: Same  Indication: Other-IV antibiotics  Additional clinical history: Infected left hip requires intravenous  antibiotic course, nephrology deems PICC contraindicated due to  potential use of upper extremities for dialysis fistula in the future.  IR consulted for tunneled single lumen central venous catheter  placement    Complications: No immediate complications.      Impression    IMPRESSION:    Insertion of right-sided tunneled 5 Amharic single lumen power line  catheter, with tip in the expected location of the superior vena cava.    Plan:     The catheter may be used immediately.  _______________________________________________________________    PROCEDURE SUMMARY:  - Venous access with ultrasound guidance  - Tunneled central venous catheter insertion with fluoroscopic  guidance  - Additional procedure(s): None    PROCEDURE DETAILS:    Pre-procedure  Consent: Informed consent for the procedure including risks, benefits  and alternatives was obtained and time-out was performed prior to the  procedure.  Preparation (MIPS): The site was prepared and draped using all  elements of maximal sterile barrier technique including sterile  gloves, sterile gown, cap, mask, large sterile sheet, sterile  ultrasound probe cover, hand hygiene and cutaneous antisepsis with 2%  chlorhexidine.   Medical reason for site preparation exception (MIPS): Not  applicable    Anesthesia/sedation  Level of anesthesia/sedation: Minimal sedation (anxiolysis), 1 mg  midazolam IV  Anesthesia/sedation administered by: Independent trained observer  under attending supervision with continuous monitoring of the  patient?s level of consciousness and physiologic status  Total intra-service sedation time (minutes): N/a    Access  Local anesthesia was administered. The vessel was sonographically  evaluated and determined to be patent. Real time ultrasound was used  to visualize needle entry into the vessel and a permanent image was  stored.  Laterality: Right  Vein accessed: Internal jugular vein  Access technique: Micropuncture set with 21 gauge needle    Catheter placement  An incision was made near the venous access site and the catheter was  tunneled subcutaneously to the venous access site and trimmed to  appropriate length. The catheter was advanced via a peel-away sheath  into the vein under fluoroscopic guidance. Catheter tip location was  fluoroscopically verified and a permanent image was stored.  Catheter placed: Bard PowerLine with SureCuff Ingrowth Cuff  Lot number: [id number]  Catheter size (Telugu): 5  Lumens: Single-lumen   Power injectable: Yes  Catheter tip: superior vena cava  Catheter flush: Heparin (10 units/mL)    Closure  A sterile dressing was applied.  Access site closure technique: Tissue adhesive  Catheter securement technique: Non absorbable suture and Stat-Lock    Radiation Dose  Fluoroscopy time (seconds): 4.4    Reference air kerma (mGy): 1.21     Additional Details  Additional description of procedure: None  Device used: None  Equipment details: None  Unique Device Identifiers: Not available  Specimens removed: None  Estimated blood loss (mL): Less than 10  Standardized report: [email or web address]_TunneledCatheter_v3.1    Attestation  Signer name: SOCT DOYLE PA-C  I attest that I was present for the entire procedure. I reviewed the  stored images and agree with the  report as written.      SCOT DOYLE PA-C         SYSTEM ID:  [id number]   US Renal Complete Non-Vascular    Narrative    EXAMINATION: US RENAL COMPLETE NON-VASCULAR, 7/1/2023 3:16 PM     COMPARISON: Ultrasound 6/23/2023, CT pelvis 10/25/2022    HISTORY: HALEY    TECHNIQUE: The kidneys and bladder were scanned in the standard  fashion with specialized ultrasound transducer(s) using both gray  scale and limited color/spectral Doppler techniques.    FINDINGS:    Right kidney: Measures 11.0 cm in length. Parenchyma is of normal  thickness and echogenicity. No focal mass. No hydronephrosis.    Left kidney: Measures 10.8 cm in length. Parenchyma is of normal  thickness and echogenicity. No focal mass. No hydronephrosis.     Bladder: Unchanged bladder wall thickening measuring up to 6 mm,  likely in part due to the underdistention of the bladder.      Impression    IMPRESSION:  1.  Normal kidneys.   2.  Unchanged bladder wall thickening, likely in part due to  underdistention of the bladder. Findings are nonspecific, however, may  represent cystitis given recent history of urinary tract infection.    I have personally reviewed the examination and initial interpretation  and I agree with the findings.    MORRIS PEREZ MD         SYSTEM ID:  [id number]   CT Head w/o Contrast    Narrative    CT HEAD W/O CONTRAST 7/4/2023 8:42 PM    Provided History: neuro changes    Comparison: None.    Technique: Using multidetector thin collimation helical acquisition  technique, axial, coronal and sagittal CT images from the skull base  to the vertex were obtained without intravenous contrast.     Findings:    No intracranial hemorrhage. No mass effect. No midline shift. No  extra-axial fluid collection. The gray to white matter differentiation  of the cerebral hemispheres is preserved. Ventricles are proportionate  to the sulci. Moderate cerebral atrophy. No sulcal effacement. The  basal cisterns are patent. Periventricular white matter  hypodensities  are nonspecific but likely represent chronic small vessel ischemic  disease.    Mild scattered mucosal thickening in the paranasal sinuses. The  mastoid air cells are clear. Orbits appear unremarkable. No acute  fracture.      Impression    Impression:   1. No acute intracranial pathology.  2. Periventricular white matter hypodensities which are nonspecific,  but likely representing chronic small vessel ischemic disease.  3. Moderate cerebral atrophy.    I have personally reviewed the examination and initial interpretation  and I agree with the findings.    JOSE COMER MD         SYSTEM ID:  [id number]   XR Pelvis Port 1/2 Views    Narrative    EXAM: XR PELVIS PORT 1/2 VIEWS  LOCATION: [facility]  DATE: 7/23/2023    INDICATION: Pain.  COMPARISON: Pelvis CT 06/19/2023.      Impression    IMPRESSION: Redemonstrated postoperative changes status post left hip arthroplasty with cerclage wire fixation. Incompletely healed osteotomy vs periprosthetic fracture along the lateral femoral cortex is again seen. No definite new fracture notify with   limitations of osteopenia. Vascular calcifications.   IR CVC Tunnel Placement > 5 Yrs of Age    Narrative    PROCEDURES 8/1/2023:  1. Ultrasound guidance for venous access  2. Placement centrally inserted catheter (age > 5 yrs.) tunneled, no  port, no pump  3. Fluoroscopic guidance placement    Clinical History: Renal failure requiring longer-term hemodialysis.  Double-lumen tunneled central venous catheter placement requested.    Comparisons: 6/27/2023    Staff Radiologist: Keena Lomeli MD.  I, Keena Lomeli,  performed the entire procedure.    Fellow(s)/Resident(s): None    Assistant: None    Medications:   1% lidocaine for local anesthesia  And Ancef 2 g IV for procedure prophylaxis  Fentanyl 50 mcg IV    Nursing:  The patient was placed on continuous monitoring. Intravenous  sedation was administered. Sedation  time was 20 minutes. Attending  face-to-face time 20 minutes. Vital signs and sedation monitored by  nursing staff under Interventional Radiologists supervision. The  patient remained stable throughout the procedure.    Fluoroscopy time: 0.6 minutes    PROCEDURE: The patient understood the limitations, alternatives, and  risks of the procedure and requested the procedure be performed. Both  written and oral consent were obtained.    Patient has a right-sided internal jugular tunneled central venous  catheter. Therefore left line placement was pursued.    Limited left internal jugular ultrasound documented jugular vein  patency.    The left neck and upper chest were prepped and draped in the usual  sterile fashion. Ten to one volume mixture of 1% lidocaine without  epinephrine was used for local anesthesia.     Under ultrasound guidance, left internal jugular venotomy was made  with a micropuncture needle. Permanent copy of the image documenting  the vein was entered into the patients record.    Under fluoroscopic guidance, 0.018 inch wire advanced to upper IVC.  The micropuncture needle was exchanged over guidewire for the  micropuncture sheath. Necessary intravascular catheter length was  measured with a 0.018 guidewire. 4Lessson wire advanced to IVC and  locked in place.    A 14.5 Thai, 27 cm length tip to cuff glide catheter hemodialysis  capable catheter was subcutaneously tunneled from the left anterior  chest to the left internal jugular venotomy site. Micropuncture sheath  exchanged for the peel-away sheath. The catheter was placed through  the peel-away sheath. The catheter tip was placed at the right atrium  under fluoroscopic guidance. Peel-away sheath removed. Both lumens  aspirated and flushed adequately. Each lumen heparin locked with  1000:1 heparin solution. 3-0 nylon catheter retaining suture and  sterile dressing were applied. Left internal jugular venotomy site  closed with single 4-0 Monocryl  subcuticular suture followed by  Dermabond. No immediate complication.      Impression    IMPRESSION:   Placement of left internal jugular 14.5 Frisian, 27 cm tip to cuff  glidepath hemodialysis capable catheter with tip positioned at the  right atrium. Catheter is well functioning, heparin locked, and ready  for immediate use.    YOESLYN CROWELL MD         SYSTEM ID:  [id number]   IR CVC Tunnel Removal Left    Narrative    PRE-PROCEDURE DIAGNOSIS: Acute kidney injury    POST-PROCEDURE DIAGNOSIS: Same    PROCEDURE: Removal of tunneled central venous catheter    Impression    IMPRESSION: Completed removal of tunneled central venous catheter.  There were no complications.    ----------    CLINICAL HISTORY: The patient has a tunneled central venous catheter.  Therapy was complete and catheter removal was requested.    PERFORMED BY: Jose Stockton PA-C    MEDICATIONS: 1% lidocaine without epinephrine was available for local  anesthesia. No intravenous medications for sedation were utilized.    DESCRIPTION: Physical examination demonstrated no erythema,  tenderness, fluctuance, or discharge at the catheter exit site or  along the tract. The catheter and its entry site into the skin was  prepped and draped in usual sterile fashion.     Lidocaine mixture and blunt dissection were used around the catheter  and subcutaneous cuff.      Using steady gentle traction, the catheter and cuff were freed from  the subcutaneous tissue, and the entire catheter was removed without  difficulty. Compression was applied over the venipuncture site, as  well as along the tract, until good hemostasis was achieved. A sterile  dressing was applied to the skin at the exit site.    COMPLICATIONS:  No immediate concerns; the patient remained stable  throughout the procedure and tolerated it well.    ESTIMATED BLOOD LOSS:  Minimal    SPECIMENS:  Catheter removed per above    TIME:  A total of 15 minutes was spent with the patient. Greater than  50%  of the time was spent in counseling, education, and coordination  of care.    --------    INSTRUCTIONS GIVEN TO PATIENT:  Keep site clean, dry, and covered for  72 hours.  Change the dressing if it becomes soiled, wet, or blood  soaked.  After 72 hours the dressing may be removed and the site may  be left uncovered and may get wet if the site has sealed.  If the site  has not sealed, keep it covered and dry with daily dressing changes  until sealed.  Monitor the neck over the collar bone for swelling and  the chest exit site for bleeding or infection as instructed.   Telephone Interventional Radiology daytime [phone], or [facility]   after hours [phone] (and ask for IR on-call), if  problems or concerns develop.  If the site bleeds, sit upright and  hold pressure on the site and above the collar bone for 10 minutes.   If it continues to bleed, continue holding pressure and telephone  Interventional Radiology at the number(s) above.    JUNIOR MOYA PA-C         SYSTEM ID:  [id number]   XR Cervical Spine 2/3 Views    Narrative    Exam: XR CERVICAL SPINE 2/3 VIEWS, 8/10/2023 4:56 PM    History:  Neck pain, post prolonged hospitalization neck pain, post  prolonged hospitalization    Comparison: None    Findings:   AP and lateral views of the cervical spine.  C5, C6, and C7 are  obscured by overlying soft tissue.    Partially visualized straightening of the expected cervical lordosis.  Grade 1 anterolisthesis of C3 on C4. The posterior elements of C3 and  C4 appear ankylosed.  This narrowing at C4-5. Multilevel degenerative  change including anterior vertebral body spurring, endplate  irregularity, and probable facet hypertrophy. Prevertebral soft tissue  within normal limits. Partially visualized perihilar opacities. Right  approach PICC terminates over the SVC.      Impression    Impression:   1.  No acute traumatic fracture or subluxation of the partially  visualized cervical spine.  2.  Moderate cervical  spondylosis.  3.  Partially visualized perihilar pulmonary edema and/or atelectasis.    I have personally reviewed the examination and initial interpretation  and I agree with the findings.    ROBBY THACKER MD         SYSTEM ID:  [id number]   XR Calcaneus Left G/E 2 Views    Narrative    EXAM: XR CALCANEUS LEFT G/E 2 VIEWS  LOCATION: [facility]  DATE: 8/10/2023    INDICATION: Left calcaneal and.  COMPARISON: None available.      Impression    IMPRESSION: Wound dressing overlies the lateral aspect of the left heel and calcaneus, limiting osseous detail. There is the appearance of subtle cortical loss at the lateral aspect of the calcaneus, suspicious for underlying erosion in the setting of   acute osteomyelitis. However, this could be artifactual, due to the overlying wound dressing. Correlation with MR imaging is recommended.    Mild polyarticular osteoarthritis. No radiographic evidence of acute fracture or malalignment. Diffuse vascular calcifications.   CT Head w/o Contrast    Narrative    EXAM: CT HEAD W/O CONTRAST  8/10/2023 5:14 PM     HISTORY:  left lower extremity paresis       COMPARISON:  CT 7/4/2023    TECHNIQUE: Using multidetector thin collimation helical acquisition  technique, axial, coronal and sagittal CT images from the skull base  to the vertex were obtained without intravenous contrast.   (topogram) image(s) also obtained and reviewed.    FINDINGS:  No acute intracranial hemorrhage, mass effect, or midline shift. No  acute loss of gray-white matter differentiation in the cerebral  hemispheres. Ventricles are proportionate to the cerebral sulci, and a  setting of unchanged moderate diffuse cerebral parenchymal volume loss  and substantial periventricular patchy low-attenuation likely  representing changes of chronic small vessel ischemic disease. Clear  basal cisterns.    The bony calvaria and the bones of the skull base are normal.  Minimal  scattered paranasal sinus mucosal thickening with clear mastoid air  cells. Grossly normal orbits.       Impression    IMPRESSION:  1. No acute intracranial pathology.  2. Unchanged moderate diffuse cerebral parenchymal volume loss.  3. Stable marked sequela of chronic small vessel ischemic disease.    I have personally reviewed the examination and initial interpretation  and I agree with the findings.    JOSE COMER MD         SYSTEM ID:  [id number]   XR Ankle Left G/E 3 Views    Narrative    EXAM: XR ANKLE LEFT G/E 3 VIEWS  LOCATION: [facility]  DATE: 8/12/2023    INDICATION: Pain  COMPARISON: None.      Impression    IMPRESSION: The left ankle is negative for fracture. Degenerative change at the tibiotalar joint. Achilles calcaneal spurring. Degenerative change at the subtalar joints.   XR Knee Left 1/2 Views    Narrative    EXAM: XR KNEE LEFT 1/2 VIEWS  LOCATION: [facility]  DATE: 8/12/2023    INDICATION: fall out of bed in setting of prior total left hip, increased pain in left hip and knee  COMPARISON: None.      Impression    IMPRESSION: Degenerative change at the medial and patellofemoral compartment. No evidence for fracture. No effusion.   XR Pelvis 1/2 Views    Narrative    EXAM: XR PELVIS 1/2 VIEWS  LOCATION: [facility]  DATE: 8/12/2023    INDICATION: fall out of bed in setting of prior total left hip, increased pain in left hip and knee  COMPARISON: None.      Impression    IMPRESSION: Postoperative changes left total hip arthroplasty and cerclage wire fixation. No evidence for fracture or dislocation. Degenerative change right hip joint. Pelvis negative for fracture. Vascular calcifications.   CT Foot Left w/o Contrast    Narrative    EXAM: CT FOOT LEFT WITHOUT CONTRAST  LOCATION: [facility]  DATE:  08/13/2023    INDICATION: Increased pain, unstageable ulcer, concerns for osteomyelitis based on x-ray.  COMPARISON: 08/12/2013 radiographs.  TECHNIQUE: Noncontrast. Axial, sagittal and coronal thin-section reconstruction. Dose reduction techniques were used.     FINDINGS:     BONES:  -Diffuse osteopenia. No evidence of a fracture. No findings to suggest osteomyelitis. Normal alignment. Advanced degenerative changes at the first MTP joint.    SOFT TISSUES:  -Moderate global atrophy of the intrinsic musculature of the foot. No discrete fluid collection to suggest an abscess.      Impression    IMPRESSION:  1.  No fracture or osteomyelitis.    2.  Marked diffuse osteopenia of the bones of the foot.    3.  Advanced degenerative changes at the first MTP joint.     MR CERVICAL SPINE W/O & W CONTRAST    Narrative    EXAM: MR CERVICAL SPINE W/O & W CONTRAST  8/13/2023 7:57 PM     HISTORY:  spondylosis, worsening pain, no fracture based on xray; Neck  pain; r/o Spinal infection; Neck pain with rest; No  known/automatically detected potential contraindications to imaging       COMPARISON:  Cervical spine radiographs 8/10/2023.    TECHNIQUE: Sagittal T1-weighted and T2-weighted and axial T2-weighted  images of the cervical spine were obtained without intravenous  contrast. Post intravenous contrast using gadolinium axial and  sagittal T1-weighted images were obtained with fat saturation. Some  sequences were repeated due to patient motion during initial image  acquisition.    CONTRAST: 13 mL Gadavist.    FINDINGS:  Motion degraded examination. Normal cervical vertebral alignment.  Degenerative marrow edema in the opposing endplates at C6-7.  Multilevel disc dehydration and loss of intervertebral disc height,  greatest at C5-6 and C6-7. No cord signal abnormality.    No abnormal enhancement of the cervical spinal cord or otherwise  within the spinal canal. No cervical mass lesion demonstrated.    The findings on a level by  level basis are as follows:    C2-3: Left facet hypertrophy. No spinal canal or neural foraminal  stenosis.     C3-4: Left greater than right facet hypertrophy. Mild right and  moderate left neural foraminal stenosis. Mild spinal canal narrowing..    C4-5: Left greater than right facet hypertrophy. Moderate left and  mild right neural foraminal narrowing. No spinal canal stenosis.    C5-6: Left greater than right uncinate and facet hypertrophy. Moderate  left and mild right neural foraminal narrowing. Mild spinal canal  narrowing.    C6-7: Disc osteophyte complex and bilateral uncinate hypertrophy. Left  greater than right facet hypertrophy. Mild right and moderate left  neural foraminal stenosis. Moderate spinal canal stenosis.    C7-T1: Left facet hypertrophy. Mild left neural foraminal narrowing.  No right neural foraminal stenosis. No spinal canal stenosis.      Impression    IMPRESSION:  1. Motion degraded examination.  2. Multilevel cervical degenerative changes, greatest at C6-7.  3. No myelopathic cord signal.  4. No fluid collection or abscess.    SERENA DAHL MD         SYSTEM ID:  [id number]   XR Knee Left 1/2 Views    Narrative    EXAM: XR KNEE LEFT 1/2 VIEWS  LOCATION: [facility]  DATE: 8/17/2023    INDICATION: Patient with fall injury of left knee rule out fracture. Pain.  COMPARISON: 08/12/2023      Impression    IMPRESSION: Degenerative change and chondrocalcinosis. No visible fracture or dislocation.   XR Chest Port 1 View    Narrative    EXAM: XR CHEST PORT 1 VIEW 8/22/2023 1:53 PM    DEMOGRAPHICS: 72 years Male    INDICATION: Eval for appopriate PICC placement, no blood return on  port    COMPARISON: Chest x-ray 6/14/2023    TECHNIQUE: Single portable AP view of the chest.    FINDINGS:   Right IJ central venous catheter with tip in the SVC. The  cardiomediastinal silhouette appears stable. The trachea is midline.  The right lung is clear. The  partially visualized left lung is clear.  The right costophrenic angle is sharp with no blunting. There is no  discernible pneumothorax. No acute osseous abnormality. Prominent  osteophyte formation at both sides of the right acromioclavicular  joint, consistent with osteoarthritis.      Impression    IMPRESSION:     1. Right IJ central venous catheter with tip in SVC  2. No focal consolidation in the visualized lungs.    I have personally reviewed the examination and initial interpretation  and I agree with the findings.    TAE MORRIS MD         SYSTEM ID:  [id number]       Discharge Medications   Current Discharge Medication List        START taking these medications    Details   amLODIPine (NORVASC) 10 MG tablet Take 1 tablet (10 mg) by mouth daily    Associated Diagnoses: Essential hypertension      carvedilol (COREG) 6.25 MG tablet Take 1 tablet (6.25 mg) by mouth 2 times daily (with meals)    Associated Diagnoses: Essential hypertension      ciprofloxacin (CIPRO) 500 MG tablet Take 1 tablet (500 mg) by mouth every 12 hours for 28 days  Qty: 56 tablet, Refills: 0    Comments: End date is 9/13  Associated Diagnoses: Bacteremia      diclofenac (VOLTAREN) 1 % topical gel Apply 2 g topically 4 times daily    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, unspecified laterality, sequela      doxycycline hyclate (VIBRAMYCIN) 100 MG capsule Take 1 capsule (100 mg) by mouth every 12 hours for 28 days  Qty: 56 capsule, Refills: 0    Comments: End date is 9/13  Associated Diagnoses: Bacteremia      ferrous sulfate (FEROSUL) 325 (65 Fe) MG tablet Take 1 tablet (325 mg) by mouth every other day    Associated Diagnoses: Acute blood loss anemia      folic acid (FOLVITE) 1 MG tablet Take 1 tablet (1 mg) by mouth daily    Associated Diagnoses: Acute blood loss anemia      Lidocaine (LIDOCARE) 4 % Patch Place 1 patch onto the skin every 24 hours To prevent lidocaine toxicity, patient should be patch free for 12  hrs daily.    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, unspecified laterality, sequela      melatonin 3 MG tablet Take 1 tablet (3 mg) by mouth At Bedtime    Associated Diagnoses: Physical deconditioning      miconazole (MICATIN) 2 % external powder Apply topically 2 times daily    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, unspecified laterality, sequela; Acute blood loss anemia      multivitamin w/minerals (THERA-VIT-M) tablet Take 1 tablet by mouth daily    Associated Diagnoses: Acute blood loss anemia      thiamine (B-1) 100 MG tablet Take 1 tablet (100 mg) by mouth daily    Associated Diagnoses: Acute blood loss anemia      Vitamin D3 (CHOLECALCIFEROL) 25 mcg (1000 units) tablet Take 2 tablets (50 mcg) by mouth daily    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, unspecified laterality, sequela           CONTINUE these medications which have NOT CHANGED    Details   acetaminophen (TYLENOL) 325 MG tablet Take 2 tablets (650 mg) by mouth every 4 hours as needed for other (For optimal non-opioid multimodal pain management to improve pain control.)    Associated Diagnoses: Prosthetic joint infection, initial encounter (H)      atorvastatin (LIPITOR) 40 MG tablet Take 40 mg by mouth daily      docusate sodium (COLACE) 100 MG capsule Take 100 mg by mouth 2 times daily      lactobacillus rhamnosus, GG, (CULTURELL) capsule Take 1 capsule by mouth 2 times daily    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      methocarbamol (ROBAXIN) 500 MG tablet Take 1 tablet (500 mg) by mouth every 6 hours as needed for muscle spasms    Associated Diagnoses: Infection of prosthetic joint, subsequent encounter      polyethylene glycol (MIRALAX) 17 g packet Take 17 g by mouth daily     Associated Diagnoses: Prosthetic joint infection, initial encounter (H)      senna-docusate (SENOKOT-S/PERICOLACE) 8.6-50 MG tablet Take 1 tablet by mouth 2 times daily    Associated Diagnoses: Infection of prosthetic joint, subsequent encounter      traMADol (ULTRAM) 50 MG tablet Take 25-50 mg by mouth every 6 hours as needed for severe pain           STOP taking these medications       apixaban ANTICOAGULANT (ELIQUIS) 5 MG tablet Comments:   Reason for Stopping:         insulin aspart (NOVOLOG PEN) 100 UNIT/ML pen Comments:   Reason for Stopping:         insulin aspart (NOVOLOG PEN) 100 UNIT/ML pen Comments:   Reason for Stopping:         magnesium oxide 400 MG CAPS Comments:   Reason for Stopping:         oxyCODONE (ROXICODONE) 5 MG tablet Comments:   Reason for Stopping:             Allergies   Allergies   Allergen Reactions    Sulfa Antibiotics      Other reaction(s): *Unknown - Childhood Rxn    Tizanidine Other (See Comments)     Frequent urination; causes drowsiness, and dry mouth

## 2023-08-29 NOTE — PROGRESS NOTES
08/29/23 0900   Name of Certified Therapeutic Rec Specialist   Name of Certified Therapeutic Rec Specialist KUMAR Riggs   Appointment Type   Type of Therapeutic Rec Session Therapeutic Rec Assessment   General Information   Patient Profile Review See Profile for full history and prior level of function   Daily Contact with Relatives or Friends Phone call;Visit   Pets Other (see comments)  (loves dogs)   Community Involvement   Community Involvement Disabled   Spiritual Practice Not connected/interested   Outings Other (comments)  (weekly lunches with friends)   Music   Music Preferences Country;Rock   Listens to Recorded Music Yes   Brought Own Equipment Yes   Media   Computer Has laptop here   TV / Movies TV   Sports / Physical Activities   Outdoor Activities Lawn / yard care   Sports Fan Baseball;Football;Basketball;Hockey   Impression   Open to Socializing with Others Initiates with cues   Barriers to Leisure Endurance;Mobility;Sitting tolerance   Patient, family and / or staff in agreement with Plan of Care Yes   Treatment Plan   Interested in Unit [place]? No   Equipment and Supplies While on Unit None needed   Assessment Re assessment completed, pt is well known to this writer from multiple previous admissions. Pt's affect is different, affect is flat with minimal conversation. will monitor pt and provide materials as needed.

## 2023-08-29 NOTE — PLAN OF CARE
Goal Outcome Evaluation:  Pt.admitted yesterday - well known to unit. A&Ox4. Uses call light appropriately. Has been sleeping / snoring throughout shift and has no c/o's or requests as of yet. Urinal @ bedside. Has R internal jugular CVC.     0615 - CBC / BMP drawn this AM. Scheduled abx administered po. Allowed slight repositioning. Lidoderm patch removed L heel - mepilex intact.        Patient's most recent vital signs are:     Vital signs:  BP: 117/72  Temp: 97.3  HR: 69  RR: 17  SpO2: 97 %     Patient does not have new respiratory symptoms.  Patient does not have new sore throat.  Patient does not have a fever greater than 99.5.

## 2023-08-29 NOTE — PROGRESS NOTES
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  Underwent I&D with Dr. Meyers on 6/21/23.  Currently on prolonged oral antibiotic course.  He is transferred back to TCU on 8/28 for PT and OT.      # MRSA bacteremia 2/2 indwelling drain c/b L hip septic arthritis s/p debridement and antibiotic implant retention (6/21/23)   Developed encephalopathy while previously at TCU and infectious workup started.  Blood cultures from 6/14, 6/15 positive for MRSA (4/4), and 6/16 (1/2 GPCs) and 6/17 (2/2) GPCs.  Started vancomycin 6/15.  On 6/17 synovial fluid cultures with Staph and Klebsiella. Additionally, purulent drainage noted at incision site at TCU with CT of left hip concerning for infection. KEILA did not show endocarditis.  The patient received ceftazidime and vancomycin for management of corynebacterium, Pseudomonas, and MRSA on cultures during hospitalization on [facility]. ID re-evaluated patient on 8/24 and recommended continuing PO Ciprofloxacin and Doxycycline through 9/13 to complete a total of 12 weeks of antibiotics for PJI. If patient remains in TCU at that time, would recommend re-engaging with ID team regarding prolonged course of PO abx. They also recommended weekly trending CRP and ESR to monitor response to therapy.   - Orthopedics team re-consulted prior to transfer to TCU for timing of follow up and related imaging,   - PT and OT consulted; historically has been challenging to get patient to participate in therapies due to ongoing pain   - Continue Doxycycline 100mg BID through 9/13   - Continue Cipro 500mg BID through  "9/13    - Continue posterior hip precautions x 3 months (~ 9/21/23); no flexion beyond 90 degrees, adduction beyond midline, internal rotation beyond midline    # Neck pain 2/2 multilevel cervical degenerative changes C6 C7  # Left leg pain, generalized pain  Patient was ruled out for fracture and osteomyelitis with MRI. Continues to report chronic pain mostly in his neck and left leg, worsens with therapy and limits his ability to participate in rehab efforts.  Started on Gabapentin during hospitalization but stopped due to somnolence/oversedation. Discussed with patient that we could decrease dose, declined at this time    - Continue Voltaren gel QID to affected areas   - Continue Lidocaine patches to affected areas     # Worsening left ankle pain s/p fall during hospital stay   # Left foot pain 2/2 degenerative changes   # Unstageable L heel ulcer - POA, chronic    Reports ongoing left ankle and plantar foot pain since falling during hospital admission, describes it as numbness from ankle down and that his foot feels \"big\" when bearing weight.     - 3 view XR ankle ordered, final read is pending   - WOCN consult   - Pain management modalities as above     # Adjustment disorder w/ depressed mood  # Prolonged grief disorder   Ongoing 2/2 prolonged hospitalization (has been on [employer] between TCU and hospital since 11/2022).  Very depressed and flat affect on exam but appropriately conversant.  Would likely benefit from medication therapy and Psychiatry/Health Psychology evaluations.  Will gently recommend this pending TCU course.      # Anemia - Required blood transfusion last on 7/7.  Hgb has remained stable at 7-8.    - Continue PTA ferrous sulfate  - Continue PTA folate, thiamine  - CBC q Mon, Thur   - Transfuse for Hgb < 7     # Severe malnutrition in context of acute on chronic illness   - Nutrition consult pending trends in PO intake     Stable/chronic issues:   # HTN - Continue PTA Amlodipine 10mg and " Coreg 6.25mg BID w/ hold parameters.   # HLD - Continues PTA Atorvastatin.   # Type 2 DM -   # Hx VTE - Hx of VTE in 2018, requires lifelong AC.  Continue PTA Eliquis 2.5mg BID.  # JAIR - Continue home CPAP     Resolved hospital problems:   # Multifactorial encephalopathy - In setting of sepsis at time of transfer back to hospital.  Resolved.    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  Cr 1.95.  Recently BL ~ 2.0.  Monitor BMP twice weekly as above. Avoid/minimize nephrotoxic agents.    # Asymptomatic bacteruria - UA obtained on 7/19 to assess for casts, reflexed to culture which is growing VRE. Labs and vitals with low concern for new infection. Discussed with ID who recommended against treatment.    # Hypoxemic respiratory failure 2/2 immobility, surgery - Resolved. Stable on room air.  Monitor SpO2 w/ vitals.   # Constipation - Continue scheduled bowel regimen with Senna and Miralax.   # Hypomagnesemia - Resolved.  Briefly required supplementation during prev admission.       Consulting Services:  PT, OT, Nutrition       CODE: FULL   DVT: DOAC  Diet: Regular   Indications for Psychotropic Medications: n/a   Vaccinations:   Disposition: TBD, likely will need LTC pending PT, OT evaluations       Mariana Sorto, CNP, APRN  Internal Medicine [facility] Hospitalist  [facility]  Pager [phone]    ________________________________________________________________    Subjective & Interval History:      Waldo is resting in bed.  He says that he isn't having the best day and doesn't feel much like talking but eventually verbalizes his frustrations with his surgery.  He feels that he is in worse pain now than he was prior to his initial hospitalization.  His major complaint is his left ankle pain, and he is concerned about having sustained a fracture recently, says he fell while at the hospital about 3-4 weeks ago.  Describes it as a numbness and tingling sensation that starts at ankle and becomes worse over  the bottom of his foot.      Last 24 hour care team notes reviewed.   ROS: 4 point ROS (including Respiratory, CV, GI and ) was performed and negative unless otherwise noted in HPI.     Medications: Reviewed in EPIC.    Physical Exam:    Blood pressure (!) 142/72, pulse 75, temperature 97.5  F (36.4  C), temperature source Oral, resp. rate 16, SpO2 97 %.    GENERAL: Alert and oriented x 3. Well nourished, well developed.  No acute distress.  Flat affect.    HEENT: Normocephalic, atraumatic. Anicteric sclera. Mucous membranes moist.   CV: RRR. S1, S2. No murmurs appreciated.   RESPIRATORY: Effort normal on room air. Lungs CTAB with no wheezing, rales, or rhonchi.   GI: Abdomen soft and non distended, bowel sounds present x all 4 quadrants. No tenderness, rebound, or guarding.   NEUROLOGICAL: No focal deficits. Follows commands.  Strength equal in upper and lower extremities.   MUSCULOSKELETAL: No joint swelling or tenderness. Moves all extremities.   EXTREMITIES: No gross deformities. No peripheral edema.   SKIN: Grossly warm, dry, and intact. No jaundice. No rashes.       Lines/Tubes/Drains:   CVC Single Lumen Right Internal jugular Non - valved (open ended);Tunneled;Power injectable (Active)   Site Assessment WDL 08/28/23 1100   External Cath Length (cm) 0 cm 08/27/23 1300   Dressing Transparent 08/27/23 2334   Dressing Status clean;dry;intact 08/27/23 2334   Dressing Intervention dressing changed 08/27/23 1300   Dressing Change Due 09/03/23 08/27/23 1300   Line Necessity Yes, meets criteria 08/28/23 1100   Status no blood return;saline locked 08/22/23 1730   Cap Change Due 09/03/23 08/27/23 1300   Line Intervention Flushed;Cap change 08/27/23 2334   Purple - Status heparin locked;blood return noted 08/27/23 1300   Purple - Cap Change Due 09/03/23 08/27/23 1300   Purple - Intervention Flushed;Cap change 08/27/23 2334   Phlebitis Scale 0-->no symptoms 08/27/23 2334   Infiltration? no 08/27/23 2334   CVC Comment  one dose of alteplase administered with no success. second dose administered, results pending 08/22/23 2200   Number of days: 63           ROUTINE IP LABS (Last four results)  CMP   Recent Labs   Lab 08/29/23  0610 08/28/23  0901 08/28/23  0854 08/27/23  1954 08/27/23  1202 08/27/23  0932 08/27/23  0825 08/27/23  0022 08/26/23  2155 08/26/23  1450 08/26/23  1124 08/24/23  2216 08/24/23  0717 08/23/23  1610 08/23/23  1124     --   --   --   --   --   --   --   --   --  137  --   --   --  140   POTASSIUM 4.5  --  4.5  --   --  4.2  --   --   --   --  4.2   < >  --   --  4.1   CHLORIDE 105  --   --   --   --   --   --   --   --   --  104  --   --   --  104   CO2 26  --   --   --   --   --   --   --   --   --  25  --   --   --  26   ANIONGAP 7  --   --   --   --   --   --   --   --   --  8  --   --   --  10   GLC 95 106*  --  183* 103*  --    < >  --    < >  --  91   < >  --    < > 95   BUN 31.7*  --   --   --   --   --   --   --   --   --  31.9*  --   --   --  30.7*   CR 1.95*  --  2.04*  --   --   --   --   --   --   --  2.05*  --  2.10*  --  2.01*   MAIKOL 8.7*  --   --   --   --   --   --   --   --   --  8.8  --   --   --  8.7*   MAG  --   --  1.8  --   --   --   --  2.1  --  1.8 1.5*   < >  --    < > 1.3*   PHOS  --   --   --   --   --  3.7  --   --   --   --   --   --  3.6  --   --    PROTTOTAL  --   --   --   --   --   --   --   --   --   --   --   --   --   --  6.1*   ALBUMIN  --   --   --   --   --   --   --   --   --   --   --   --   --   --  3.3*   BILITOTAL  --   --   --   --   --   --   --   --   --   --   --   --   --   --  0.3   ALKPHOS  --   --   --   --   --   --   --   --   --   --   --   --   --   --  73   AST  --   --   --   --   --   --   --   --   --   --   --   --   --   --  19   ALT  --   --   --   --   --   --   --   --   --   --   --   --   --   --  15    < > = values in this interval not displayed.     CBC   Recent Labs   Lab 08/29/23  0610 08/26/23  1124 08/23/23  1124   WBC 9.4 8.2 7.8    RBC 2.60* 2.46* 2.50*   HGB 7.9* 7.5* 7.5*   HCT 24.3* 23.3* 23.7*   MCV 94 95 95   MCH 30.4 30.5 30.0   MCHC 32.5 32.2 31.6   RDW 17.0* 16.8* 17.1*    274 267     INR No lab results found in last 7 days.

## 2023-08-29 NOTE — CARE PLAN
"PT:   - PT and OT presented to room for Co-Evaluation given pt's known history and need for multiple skilled assist for movement.     - Prior to entry into room, discussed with RN and FABBY in hallway who was leaving pt's room attempting to clean him up. They reported that pt has soiled marcelo pads and sheets, but would not allow them to clean his skin, replace the marcelo pads, or lift him out of bed to change the soiled sheets.     - Upon entry to room, pt is laying diagonal in bed with LE pressed up against bed rail, putting inappropriate pressure on skin. Lights are off, it is quite dark. Author and BAILEY Yung introduced self to pt and explain purpose of visit. Pt is given two options for therapy:  1: Lift into the Recliner for seated exercises  2: Transfer to EOB for a short time for exercises and then transfer back supine following session.     -Pt declines both options stating that he has done both of those maneuvers \"constantly over the last month.\" He states that he doesn't need any help with either of those two things, and he could transfer EOB by himself \"easily.\"  Author asks the pt if he is willing to show us since it is his first day back on TCU and we are simply trying to gauge his new baseline. Pt states \"No, because my L ankle and foot hurt too bad, I'm pretty certain it's broken but no one will do anything about it.\" Pt indicates he thinks he hurt his foot during a recent fall.     - Pt is asked what his goals are, he responds with \"nothing.\" When pressed further he states \"I would like to not die here.\" When OT encourages him that therapy's goal is to increase his IND and move toward this goal, pt states \"Sitting at the EOB and in the recliner aren't going to do anything, that's not what I need.\" When asked what he feels he needs, he states \"Range of Motion and Resistance exercises, but I could only do those with my R leg, not my L shira I'm pretty sure it's broken.\" Author then indicates she would like " "to transfer him EOB so resistance exercises can be performed against gravity, pt states \"I'm against gravity right now laying here.\"     - Pt has a secondary complaint that the nurses are \"incompetent\" which he states \"I'm sorry to say but that's how I feel\" and states that he soiled the chucks and the sheets but that they just \"left him.\" The OT asks the pt directly \"Did you allow them to clean you up?\"  And the pt states \"they never asked me.\" This is contrary to the report given by the nurses prior to session.     - The interaction ended with both therapists confirming that the pt is unwilling to participate in any movement. Pt states \"just leave me here to die.\" Therapists offer to reposition pt for improved comfort and reduction of pressure on LE, pt states \"I can do that, I do that all day long.\"      "

## 2023-08-29 NOTE — PLAN OF CARE
Patient is alert and oriented x4. Able to make needs known to staff. Patient is continent of both bowel and bladder. Voids spontaneously without difficulty. Transfers assist-2 w/ liko lift. Patient denied Chest pain, SOB, new onset cough and N&V. Diet: Regular diet with fair appetite. Declined breakfast. Asked shades in room to be lowered and lights turned off. Writer and NA attempted x2 to do caridad-cares and changed bed sheets because patient was incontinent and sheets were spoiled, but patient refused. Does not want chux or any incontinence care products on or under him. Reported pain to left ankle but initially declined PRN tramadol when offered. Port  to right chest is intact, dressing dry, and heparinized. Pain later managed with PRN tramadol x1 which was somewhat effective. Call-light within reach. Continue with POC.    Vital signs: T: 97.5,  B/P: 138/72,  P: 75,  R: 16,  SpO2: 97 %     Patient does not have new respiratory symptoms.  Patient does not have new sore throat.  Patient does not have a fever greater than 99.5.

## 2023-08-29 NOTE — PROGRESS NOTES
"   08/29/23 1105   Appointment Info   Signing Clinician's Name / Credentials (OT) Aga Reddy OTR/L CBIS   Appointment Canceled Reason (OT) Patient declined   Appointment Cancel Comments (OT) oT: therapist spent extensive time w/ pt discussing role of OT, role of TCU and therapy, pt's goals, pt reported his only goal was \"to not die here\", refused to partic in OT eval, impaired reasoning   Rehab Comments (OT) OT: pt refused eval, refer to PT note on 8/29/23 by Chyna Ramirez PT for specifics re: conversation w/ pt and pt's refusal details.   Living Environment   People in Home   (prior to lengthy stay, pt was living in house w/ significant other)   Transportation Anticipated family or friend will provide;health plan transportation   Self-Care   Usual Activity Tolerance poor   Current Activity Tolerance poor   Equipment Currently Used at Home none   Fall history within last six months yes   Number of times patient has fallen within last six months   (2-3 falls at hospital)   Activity/Exercise/Self-Care Comment Due to multiple medical complications, pt has been bed bound x 8 months; total assist & use of lift for bed to chair   Instrumental Activities of Daily Living (IADL)   IADL Comments depend   General Information   Onset of Illness/Injury or Date of Surgery   (Nov 2022)   Referring Physician Juliana Lorenzo MD   Additional Occupational Profile Info/Pertinent History of Current Problem per chart review:72 year old male with a history of type 2 diabetes, hyperlipidemia, chronic back pain, JAIR, 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 Meyers and found to have MRSA bacteremia now on prolonged oral antibiotic therapy to complete treatment course. Stable and awaiting discharge to TCU at " "this time.   Existing Precautions/Restrictions no hip IR;no hip ADD past midline;90 degree hip flexion   Left Upper Extremity (Weight-bearing Status) full weight-bearing (FWB)   Right Upper Extremity (Weight-bearing Status) full weight-bearing (FWB)   Left Lower Extremity (Weight-bearing Status) weight-bearing as tolerated (WBAT)   Right Lower Extremity (Weight-bearing Status) full weight-bearing (FWB)   Cognitive Status Examination   Cognitive Status Comments OT: pt grossly oriented to place,date ,self but denial of situation, irrational, unable to partic in reasonable discussion.   Visual Perception   Visual Acuity glasses   Pain Assessment   Patient Currently in Pain   (c/o pain L ankle, insisting it is \"broken\", indicating from fall at hosp)   Bed Mobility   Comment (Bed Mobility) OT: pt stated he could do it but refused to demo with or without assist   Transfers   Transfer Comments refused   Activities of Daily Living   BADL Assessment/Intervention   (refused)   Clinical Impression   Clinical Impression Comments OT:pt REFUSED, refer to PT note written by Chyna Ramirez PT on 8/29/23 for further details re: specifics of interaction of OT /PT w/ pt on attempt to evaluate pt.   OT Discharge Planning   OT Plan OT: precautions:LLE WBAT,no hip int rot, no hip add past midline, 90deg hip flex,   OT Discharge Recommendation (DC Rec) Long term care facility  (antic need for supportive environemnt that provides 24/7 assist due to limited mobility per previous tcu admit and per chart review of recent hospitalization)   Post Acute Settings Only   What unit is patient on? TCU       "

## 2023-08-29 NOTE — PROGRESS NOTES
Social Work: Initial Assessment with Discharge Plan    Patient Name: Waldo Bustos  : 1950  Age: 72 year old  MRN: [id number]  Completed assessment with: Chart review and pt interview  Admitted to TCU: 2023    Presenting Information   Date of SW assessment: 2023  Health Care Directive: Provided education  Primary Health Care Agent: Self  Secondary Health Care Agent: Brother, Ivan, is surrogate decision-maker. No HCD or POA completed.  Living Situation: Resides with significant other, Claire Das in a multi-level home prior to hospitalization in 2022. Likely will discharge to a different setting.   Previous Functional Status: independently ambulated with cane PTA, also drove and tended to ADL's independently   DME available: [employer] straight  Patient and family understanding of hospitalization: Appropriate and pleasant.  Cultural/Language/Spiritual Considerations: 72 year old  male, English speaking, , and Yazdanism epifanio.  Abuse concerns: Pt denies.  -------------------------------------------------------------------------------------------------------------  TRANSPORTATION:    Has lack of transportation kept you from medical appointments, meetings, work, or from getting things needed for daily living?  A. Yes, it has kept me from medical appointments or from getting my medications  B. Yes, it has kept me from non-medical meetings, appointments, work or from getting things that I need  C. No  X. Patient Unable to respond  Y. Patient declines to respond  -------------------------------------------------------------------------------------------------------------  Health Literacy:   How Often do you need to have someone help you when you read instructions, pamphlets, or other written material from your doctor or pharmacy?  0.       Never  1.       Rarely  2.       Sometimes  3.       Often  4.       Always  5.       Patient declines to respond  6.       Patient unable to  respond  ------------------------------------------------------------------------------------------------------------   BIMS:  See flow sheet     -----------------------------------------------------------------------------------------------------------  CAM:  See flow sheet  -------------------------------------------------------------------------------------------------------------  PHQ-9:   Over the last 2 weeks, have you been bothered by any of the following problems?     If symptom is present, then ask the patient:  About how often have you been bothered by this?   Symptom Presence                                              Symptom Frequency  0. No                                                                     0. Never or 1 day  1. Yes                                                                   1. 2-6 days (several days)  9. No Response                                                    2. 7-11 days (half or more of the days)                                                                                3. 12-14 days (nearly every day)       Present Frequency   A.       Little interest of pleasure doing things  1  3   B.       Feeling down, depressed, or hopeless  1  3   C.       Trouble falling or staying asleep, or sleeping too much  0     D.       Feeling tired or having little energy  1  2   E.       Poor appetite or overeating  1  3   F.        Feeling bad about yourself - or that you are a failure, or have let yourself or your family down  1  2   G.      Trouble concentrating on things, such as reading the newspaper or watching television  0     H.      Moving or speaking so slowly that other people could have noticed. Or the opposite - being so fidgety or restless that you have been moving around a lot more than usual  1  1   I.         Thoughts that you would be better off dead, or of hurting yourself in some way  0        -------------------------------------------------------------------------------------------------------------  SOCIAL ISOLATION  How often do you feel lonely or isolated form those around you?  0.       Never  1.       Rarely  2.       Sometimes  3.       Often  4.       Always  5.       Patient declines to respond  6.       Patient unable to respond  -------------------------------------------------------------------------------------------------------------    BIMS: Pt scored 15 on BIMS indicating Cognitively intact  PHQ-9: Pt scored 14 on PHQ-9 indicating Moderate depression  PAS: confirmation number- [id number]   Has there been a level II screen?  No  Were there any recommendations in the screen? N/A  If yes, will the recommendations we incorporated into the Plan of Care?  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 on 6/17 with encephalopathy and MRSA bacteremia.  Underwent I&D with Dr. Meyers on 6/21/23. Currently on prolonged oral antibiotic course.  He is transferred back to TCU on 8/28 for PT and OT.       Provider Information   Primary Care Physician:Jv Felix @ [facility]  : None reported.    Mental Health:   Diagnosis: Hx of depression. Scored 14 on PHQ-9. Pt reports feeling discouraged by lack of progress with his hip.  Current Support/Services: Pt denies.  Previous Services: None reported.  Services Needed/Recommended: Pt was offered [place] [facility] Health Psychology services while at [facility].    Substance Use:  Diagnosis: No hx of ETOH use noted.  Current Support/Services: Pt denies.  Previous Services: None reported.  Services Needed/Recommended: Pt was offered [place] and Health  Psychology services while at TCU.    Support System  Marital Status: . Significant other, Claire Das  Family support: Pt's brothers Ivan and Lalo.  Other support available: Pollo Shah.  Gaps in support system: Family unable to provide 24/7 support.    Personalized Care and Trauma  What other information would help us give you more personalized care or how can we help you with any past trauma? No hx of trauma noted. Pt reports feeling discouraged by long hospitalization/limited mobility, but had no trauma to report.    Community Resources  Current in home services: Pt denies.  Previous services: None reported.    Financial/Employment/Education  Employment Status: Retired  Income Source: group home savings  Education: Not discussed.  Financial Concerns:  Out of Medicare TCU days, had to pay privately to be readmitted to  TCU. Pollo Shah, working on spending down pt's assets to qualify for MA.  Insurance: [facility] MEDICARE ADVANTAGE. Alyssa Abad, applied for MA, waiting for this to become active.    Discharge Plan   Patient and family discharge goal: Higher LOC, SNF or [facility]  Provided Education on discharge plan: YES  Patient agreeable to discharge plan:  YES  A list of Medicare Certified Facilities was provided to the patient and/or family to encourage patient choice. Based on location and rating, patient would like referrals made to: YES  General information regarding anticipated insurance coverage and possible out of pocket cost was discussed. Patient and patient's family are aware patient may incur the cost of transportation to the facility, pending insurance payment: YES  Barriers to discharge: Requiring lift for transfers, MA not active, needing 24 hour support    Discharge Recommendations   Disposition: Higher LOC, SNF or [facility]  Transportation Needs: Will have transportation benefits once on MA.  Name of Transportation Company and Phone: N/A    Additional comments   Pt well known to  TCU,  "has been between  TCU and [facility] since 12/2022. SW met with pt to complete new IRF. Pt spent much time talking about how he feels discouraged by his long hospitalization. Pt reports he doesn't feel like therapies are working with him on what he needs - however, pt declined all therapies today. Limited insight into current needs, pt reports he feels like he could go home and sit in his recliner \"just like I've been doing here\". DON walked in when SW was heading out, pt was relaying the same information to her.     NEREYDA Shaffer  Post Acute Float   [facility]    Phone: [phone]  Fax: [phone]          "

## 2023-08-29 NOTE — PROGRESS NOTES
"Met with Don to discuss notation from therapist today in PT note about \"\"just leave me here to die\" comment.  Patient discussed that he feels with all going on upon his retrun to TCU, the things that are going on in his life outside of here have left him in a position to make a comment such as that above.  He states no active plan in harming himself and no plan.  His comments as he stated today is made out of \"having everything in his life that he knows it taken away.\"  Affect is flat as patient is communicating family dynamics, new power of  making decisions on his behalf, money from family being  used from his accounts due to power of  aspects and him having no decision in anything anymore. He states his comment today was highlighting those things in his life to which he feels he has no control over anymore. He states he does not feel this makes him better by any means:  \" my thinking is fuzzy, my body won't do what I want it to do, and I may as well just leave here in this condition because this is not better.\"  Patient hears that \"he is better\" but states \"this is not better.\"  Discussion with patient about how ill he was prior to TCU and that the infection is no longer needing IV antibiotics with the goal to keep things like this and to be able to discharge from this facility to wherever that may be next for him.  Patient stated understanding, but also talked about again the family dynamics of his situation. Patient still not cognitively intact with thoughts and facts that are not always accurate in his account. Patient is articulate and alert enough to communicate needs, but not accurate enough from a cognitive standpoint to be accurate with other aspects of his stay/events leading up to this admission.  Patient upset about who family member is that is current POA. Patient does not have good recall of how sick he was, and how far he has come.  Patient continues to look at present situation with " anger, and frustration.  Calms easy with conversation and listening but not confrontation.  Continue plan of care.

## 2023-08-30 NOTE — PROGRESS NOTES
"   08/30/23 3588   Appointment Info   Signing Clinician's Name / Credentials (PT) Azeb Quinonez DPT   Rehab Comments (PT) PT: eval complete, tx initiated   Quick Adds   Quick Adds Certification   Living Environment   People in Home significant other;other (see comments)  (Patient has been in hospital several months)   Current Living Arrangements house   Home Accessibility stairs within home   Living Environment Comments PT: prior to lengthy hospital stay pt was living with his significant other in a home with the bath and bedroom on the seond floor. Pt is unlikely to d/c back to that home,  looking for alternative placment   Self-Care   Usual Activity Tolerance poor   Current Activity Tolerance poor   Regular Exercise No   Equipment Currently Used at Home none   Fall history within last six months yes   Number of times patient has fallen within last six months   (2-3 falls at hospital during most recent stay)   Activity/Exercise/Self-Care Comment Due to medical condition and complications pt has been bed bound ~8 months and uses a mechanical lift for transfers   General Information   Onset of Illness/Injury or Date of Surgery 06/17/23  (pt was d/c from TCU back to hospital on 6/17/23 d/t medical status)   Referring Physician Dr. 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 Meyers and found to have MRSA bacteremia now on prolonged oral antibiotic therapy to complete treatment course. Stable and awaiting discharge to TCU at this time.\"   Existing " "Precautions/Restrictions fall   Weight-Bearing Status - LLE weight-bearing as tolerated   Weight-Bearing Status - RLE weight-bearing as tolerated   General Observations Pt is awake and agreeable to session   Cognition   Affect/Mental Status (Cognition) WFL;flat/blunted affect;sad/depressed affect   Follows Commands (Cognition) WFL   Cognitive Status Comments No deficits identified, defer to OT and SLP for formal assessment   Pain Assessment   Patient Currently in Pain   (pt states his left LE hurts from knee to ankle)   Integumentary/Edema   Integumentary/Edema Comments wounds managed by nursing   Posture    Posture Forward head position   Range of Motion (ROM)   ROM Comment ROM deficits in L LE secondary to pain and surgery, pt unable to perform APs on L, only tolerates 1\" of hip flexion, R LE reduced ROM but not as significant as right   Strength (Manual Muscle Testing)   Strength Comments R LE DF/PF ~4/5, L LE grossly 2-/5 secondary to pain and extended bed rest   Bed Mobility   Comment, (Bed Mobility) PT: rolls with use of bed rail   Transfers   Comment, (Transfers) dependent with ceiling lift A x 2   Gait/Stairs (Locomotion)   Comment, (Gait/Stairs) unable   Sensory Examination   Sensory Perception patient reports no sensory changes   Clinical Impression   Criteria for Skilled Therapeutic Intervention Yes, treatment indicated   PT Diagnosis (PT) decreased endurance, strength, and tolerance for functional activity secondary to prolonged hospital stay   Influenced by the following impairments medical status   Functional limitations due to impairments bed mobility, transfers, ambulation, stairs   Clinical Presentation (PT Evaluation Complexity) Unstable/Unpredictable   Clinical Presentation Rationale Age, PMH, bed bound ~8 months, recent hospitilization,  surgery   Clinical Decision Making (Complexity) high complexity   Planned Therapy Interventions (PT) balance training;bed mobility training;gait training;home " exercise program;joint mobilization;lumbar stabilization;manual therapy techniques;motor coordination training;neuromuscular re-education;orthotic fitting/training;patient/family education;postural re-education;ROM (range of motion);stair training;strengthening;stretching;transfer training;wheelchair management/propulsion training;progressive activity/exercise;risk factor education;home program guidelines   Anticipated Equipment Needs at Discharge (PT) transfer board;walker, rolling;wheelchair;wheelchair cushion   Risk & Benefits of therapy have been explained evaluation/treatment results reviewed;care plan/treatment goals reviewed;risks/benefits reviewed;current/potential barriers reviewed;participants voiced agreement with care plan;participants included;patient   Clinical Impression Comments Patient is a 72 year old male who presents following hospitalization. Pt is well know to this therapist during previous TCU stays. Pt has been in hospital or TCU ~ 8 months and has been in bed. Pt only able to transfer to chair using ceiling lift. Pt has had high pain levels during these last 8 months which have impacted his ability and willingness to participate in therapy.  PT eval revealed decreased endurance, strength and tolerance for functional activity compared to when he was at home 8 months ago. Pt will benefit from skilled  physical therapy to address his mobility with the goal of lowering his pain levels and reducing care giver burden for next level of care.   PT Total Evaluation Time   PT Eval, High Complexity Minutes (48928) 10   Therapy Certification   Start of care date 08/30/23   Certification date from 08/30/23   Certification date to 09/28/23   Medical Diagnosis physical deconditioning   Physical Therapy Goals   PT Frequency 6x/week   PT Predicted Duration/Target Date for Goal Attainment 08/27/23   PT Goals Bed Mobility;PT Goal 1;Wheelchair Mobility;PT Goal 2   PT: Bed Mobility Modified independent;Supine  to/from sit;Rolling;Bridging   PT: Wheelchair Mobility 50 feet;Caregiver Min assist;manual wheelchair   PT: Goal 1 Pt will tolerate sitting in chair, putting weight through feet in daniella LE   PT: Goal 2 Pt will perform HEP program ind in order to progress mobility   Therapeutic Procedure/Exercise   Ther. Procedure: strength, endurance, ROM, flexibillity Minutes (29774) 28   Treatment Detail/Skilled Intervention PT: sitting in recliner pt allows PT to move L LE through ~1 inch PROM in SLR. Pt encouraged to move left foot but is only able to wiggle toes slightly. R LE pt performs PF/DF against PT resistance   Therapeutic Activity   Therapeutic Activities: dynamic activities to improve functional performance Minutes (08085) 17   Treatment Detail/Skilled Intervention PT: see gg codes   PT Discharge Planning   PT Plan PT: ceiling lift to chair, bring in step and try to have pt put weight through L LE, seated vs supine LE exercises   PT Discharge Recommendation (DC Rec) Long term care facility   PT Rationale for DC Rec Pt has been bed bound   Total Session Time   Timed Code Treatment Minutes 45   Total Session Time (sum of timed and untimed services) 55   Post Acute Settings Only   What unit is patient on? TCU   Bed Mobility: Turning side to side/Roll Left and Right   Patient Performance Supervision or verbal cues   Staff Performance Set up help only   Describe Performance PT: verbal cues, extra time, bed rails   Bed Mobility: Sit to lying   Describe Performance PT: pt performed with OT, goal of session was sitting in chair   Bed Mobility: Lying to sitting on the side of bed   Describe Performance PT: pt performed with OT, goal of session was sitting in chair   Transfers: Sit to Stand   Reason Not Done Medical condition   Transfers: Chair/Bed transfers   Describe Performance PT: ceiling lift A x 2   Ambulation   Walks in room: Reason Not Done Medical condition   Walks in [place]:  Reason Not Done Medical condition   Walk 10  Feet   Reason Not Done Medical condition   Walk 10 Feet on uneven surfaces   Reason Not Done Medical condition   Walk 50 Feet with Two Turns   Reason Not Done Medical condition   Walk 150 Feet   Reason Not Done Medical condition   Locomotion   Move on unit: Reason Not Done Medical condition   Wheel 50 Feet   Reason Not Done Medical condition   Wheel 150 Feet   Reason Not Done Medical condition   1 Step (curb)   Reason Not Done Medical condition   4 Steps   Reason Not Done Medical condition   12 Steps   Reason Not Done Medical condition   Car Transfer   Reason Not Done Medical condition   Picking up Object   Reason Not Done Medical condition

## 2023-08-30 NOTE — PLAN OF CARE
Goal Outcome Evaluation:      VSS. Alert and oriented. Able to used call light appropriately. Seems withdrawn. Declined skn assessment and repositioning. A-2-3 person using ceiling lift. Did not get OOB. Generalized pain gabino to R ankle, offered pains meds but declined. Takes pill while. RIJ dressing CDI. Denies suicidal thoughts. Continent of B/B. Used urinal and bed pan.    Will continue plan of care.  Vital signs:  Temp: 97.6  F (36.4  C) Temp src: Oral BP: 137/61 Pulse: 68   Resp: 16 SpO2: 96 % O2 Device: None (Room air)

## 2023-08-30 NOTE — PROGRESS NOTES
OT evaluation     08/30/23 1148   Appointment Info   Signing Clinician's Name / Credentials (OT) FREEMAN Bustos   Rehab Comments (OT) Certification   Living Environment   People in Home   (prior to lengthy stay, pt was living in house w/ significant other)   Transportation Anticipated family or friend will provide   Self-Care   Usual Activity Tolerance poor   Current Activity Tolerance poor   Equipment Currently Used at Home none   Fall history within last six months yes   Number of times patient has fallen within last six months   (2-3 falls at hospital)   Activity/Exercise/Self-Care Comment Due to multiple medical complications, pt has been bed bound x 8 months; total assist & use of lift for bed to chair   Instrumental Activities of Daily Living (IADL)   IADL Comments dependent   General Information   Onset of Illness/Injury or Date of Surgery   (Nov 2022)   Referring Physician Juliana Lorenzo MD   Additional Occupational Profile Info/Pertinent History of Current Problem per chart review:72 year old male with a history of type 2 diabetes, hyperlipidemia, chronic back pain, JAIR, 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 Meyers and found to have MRSA bacteremia now on prolonged oral antibiotic therapy to complete treatment course. Stable and awaiting discharge to TCU at this time.   Existing Precautions/Restrictions no hip IR;no hip ADD past midline;90 degree hip flexion;fall   Left Lower Extremity (Weight-bearing Status) weight-bearing as tolerated (WBAT)   Cognitive Status Examination   Cognitive Status Comments pt grossly oriented to place,date ,self   Visual Perception   Visual Impairment/Limitations WNL   Visual Acuity glasses   Pain Assessment   Patient Currently in Pain   (L  "heal pain)   Range of Motion Comprehensive   General Range of Motion bilateral upper extremity ROM WFL  (--  R dom, Rue sh <full reports due to old \"baseball injury\" and LUE WNL.  No c/o during AROM/MMT at [facility] on 8/30)   Strength Comprehensive (MMT)   General Manual Muscle Testing (MMT) Assessment no strength deficits identified   Comment, General Manual Muscle Testing (MMT) Assessment Th recommends pt work on ex despite current strength as he needs to compensate for LE limitations.   Coordination   Coordination Comments WFLs per obeservaton   Bed Mobility   Comment (Bed Mobility) Th provided total assist to LLE due to c/o heal pain.  Th did not allow LLE to set down but kept heal floated on pillow.  Pt did the rest of the supine to sit using bed adjustments and bed rail.  He got to EOB on this right side and had difficulty centering self except for brief time.   Transfers   Transfer Comments Pt suggests doing UE while seated in recliner vs sitting unsupported on EOB.   Sit-Stand Transfer   Sit-Stand [place] (Transfers)   (dependent on mechanical lift)   Activities of Daily Living   BADL Assessment/Intervention bathing;upper body dressing;lower body dressing;grooming   Bathing Assessment/Intervention   Comment, (Bathing) NT, anticipate pt can participate in UB cares after set-up but he will be dependent LB.   Upper Body Dressing Assessment/Training   Comment, (Upper Body Dressing) NT, anticipate pt can participate in UB cares after set-up.   Lower Body Dressing Assessment/Training   Comment, (Lower Body Dressing) NT, dependent   Grooming Assessment/Training   Comment, (Grooming) NT, anticipate set-up   Clinical Impression   Criteria for Skilled Therapeutic Interventions Met (OT) Yes, treatment indicated   OT Diagnosis Decreased ADL and mobility.  Cognitive deficits noted in chart review.   OT Problem List-Impairments impacting ADL problems related to;activity tolerance " impaired;balance;cognition;mobility;strength;pain;post-surgical precautions   Assessment of Occupational Performance 3-5 Performance Deficits   Planned Therapy Interventions (OT) ADL retraining;bed mobility training;transfer training;strengthening;progressive activity/exercise   Clinical Decision Making Complexity (OT) moderate complexity   Anticipated Equipment Needs Upon Discharge (OT)   (TBD)   Risk & Benefits of therapy have been explained evaluation/treatment results reviewed;care plan/treatment goals reviewed;risks/benefits reviewed;current/potential barriers reviewed;participants voiced agreement with care plan;participants included;patient;sibling   Clinical Impression Comments Pt is on TCU s/p L hip replacement, he has LLE posterior hip precautions and pain in distal LLE (ankle/foot/heal).  He also has chronic back pain.  He is familiar to this OTRL from previous TCU stay, when pt was a poor participant due to low motivation/low mood and c/o pain. He is currently demo good UE strength and the main barriers today were c/o pain L heal and the need for encouragement to participate.  Pt's brother, Ivan, present and encouraging.  Th provided total assist to support LLE during supine to and from sit. Second person present for safety on air bed.   Pt declined UE ex in unsupported sitting but agrees to get up in recliner (with mechanical lift). Pt agrees to OT 6 days per week, anticipate 1 month of tx to improve strength for ADL/mobility.  OT will monitor appropriate LOS pending pt participation and progress.   OT Total Evaluation Time   OT Eval, Moderate Complexity Minutes (24230) 20   Therapy Certification   Start of Care Date 08/30/23   Certification date to 09/28/23   Certification date from 08/30/23   OT Goals   Therapy Frequency (OT) 6 times/wk   OT Predicted Duration/Target Date for Goal Attainment 09/27/23   OT: Hygiene/Grooming modified independent;within precautions;from wheelchair   OT: Upper Body  Dressing Supervision/stand-by assist;within precautions;from wheelchair   OT: Lower Body Dressing Maximum assist;within precautions  (pt will participate in rolling, lifting LE's....)   OT: Upper Body Bathing Supervision/stand-by assist;within precautions   OT: Bed Mobility Supervision/stand-by assist;within precautions   OT: Transfer Maximum assist;within precautions   OT: Toilet Transfer/Toileting Maximum assist;within precautions   Self-Care/Home Management   Self-Care/Home Mgmt/ADL, Compensatory, Meal Prep Minutes (49538) 15   Treatment Detail/Skilled Intervention OT: th provides total assist to LLE during bed mobility supine to and from sit due to LLE weakness and pain.  Brother is helpful to encourage pt. and despite c/o pt does participate.  He is unable to sit unsupported for UE ex, he leans onto his R elbow. He was able to sustain midline sitting x1 only a few seconds.   OT Discharge Planning   OT Plan OT: precautions:LLE WBAT, no hip int rot, no hip add past midline, 90deg hip flex.  Pain LLE foot, heal, ankle but xray is negative for fracture. Tx: Assist of 2 for safety for supine to and from sit - total assist to support LLE to keep heal floating.  LIKO lift to and from the recliner to improve sitting kolby and seated exercise. Progress to use of w/c to get to the sink.   OT Brief overview of current status See clinical impression comments above.   Post Acute Settings Only   What unit is patient on? TCU   Bed Mobility: Lying to sitting on the side of bed   Describe Performance Total assist LLE, use of bed controls and bed rail, extra time.   Transfers: Sit to Stand   Describe Performance LIKO lift   Transfers: Chair/Bed transfers   Describe Performance LIKO lift   Upper Body Dressing   Describe Performance OT: Max A  (pt not sitting unsupported well enough on EOB, and not getting up to recliner during therapy yet.   Lower Body Dressing (Pants/Undergarments)   Describe Performance OT: Dependent   Lower Body  Dressing (Putting On/Taking-Off Footwear)   Describe Performance OT: Dependent   Toileting Hygiene   Describe Performance OT: Dependent   Transfers: Toilet transfers   Describe Performance OT; Dependent, pt not getting OOB for toileting.   Hygiene/Grooming   Describe Performance OT: NT, pt has good UE function, recommend set-up.   Oral Hygiene   Describe Performance OT: NT, pt has good UE function, recommend set-up.   Bathing   Describe Performance NT, per OT clinical judgement, pt can participate with UB cares but is dependent LB, overall max A.      unable to assess

## 2023-08-30 NOTE — PHARMACY-ADMISSION MEDICATION HISTORY
Admission Medication History completed by pharmacy intern, Kristopher Abdullahi on 6/19/2023. Please see Pharmacy - Admission Medication History note under previous encounter at [facility]. for information regarding prior to admission medications.    Dee Comer, PharmD   Clinical Pharmacist

## 2023-08-30 NOTE — CARE PLAN
Care Plan created. Bedside RN to assess on progression and update.   Problem: Hip Arthroplasty  Goal: Optimal Functional Ability  Description: Implement multidisciplinary rehabilitation following early mobility guidelines; coordinate pain control to optimize comfort with activity.    Identify functional limitations, such as ADL (activities of daily living), mobility safety and independence; encourage optimal participation to minimize decline associated with inactivity.    Facilitate functional mobility, such as bed mobility, transfers and ambulation; progress and retrain as tolerated.    Encourage ADL (activities of daily living), such as self-feeding, hygiene and dressing; provide set-up, adaptations, assistance and extra time as needed.    Promote a safe and accessible environment and effective use of assistive devices and equipment.    Pace and cluster activity to balance with rest periods and conserve energy; promote adequate nutrition, sleep and rest.    Evaluate and address performance deficits, such as cognitive, balance and activity tolerance impairments.    Facilitate range of motion and prescribed exercises, such as isometric gluteal and quadriceps exercise.         Problem: Diabetes Comorbidity  Goal: Blood Glucose Level Within Targeted Range  Description: Establish target blood glucose levels based on patient-specific factors, such as age, diabetes-related complications and illness severity.    Document blood glucose levels and monitor trend; advocate for adjustment to keep within targeted range.    Provide pharmacologic therapy to maintain blood glucose levels within targeted range.    Check blood glucose level if there is a change in mental or cognitive status.      Problem: Pain Chronic (Persistent) (Comorbidity Management)  Goal: Acceptable Pain Control and Functional Ability  Description: Evaluate pain level, effect of treatment and patient response at regular intervals.    Minimize pain stimuli;  coordinate care and adjust environment (e.g., light, noise, unnecessary movement); promote sleep/rest.    Match pharmacologic analgesia to severity and type of pain mechanism (e.g., neuropathic, muscle, inflammatory); consider multimodal approach (e.g., nonopioid, opioid, adjuvant).    Provide medication at regular intervals; titrate to patient response.    Manage breakthrough pain with additional doses; consider rotation or switching medication      Problem: Obstructive Sleep Apnea Risk or Actual  Goal: Unobstructed Breathing During Sleep  Description: Use a validated screening tool to identify risk for JAIR (obstructive sleep apnea).    Implement continuous pulse oximetry to detect apnea-related oxygen desaturation events.    Encourage a nonsupine sleep position to prevent airway collapse, such as side-lying or head of bed elevated.    Minimize use of sedative, opioid and benzodiazepine medication that may contribute to respiratory depression.    Provide oxygen therapy during sleep to reduce hypoxemia.    Continue use of home prescribed continuous PAP (positive airway pressure) during sleep; utilize home device and settings if available.    Implement continuous PAP during sleep for high-risk of JAIR; consider auto-titrating PAP, humidification and mask choice (including fit and style) to improve comfort and tolerance.    Provide behavioral and supportive interventions individualized to the patient needs and preferences to improve PAP adherence.    Facilitate polysomnography (sleep study) referral if needed.       Problem: Urinary Incontinence  Goal: Effective Urinary Elimination  Description: Identify cause and contributing factors, such as disease process, medication or treatment side effects, infection or dietary bladder irritants; provide treatment.    Assess for general symptoms, such as fatigue, depression, weakness, confusion, sensory alterations that could impact toileting.    Provide safe and ready access to  toileting devices and aids (e.g., commode, urinal).    Promote behavioral methods, such as prompted toileting, elimination schedule, relaxation techniques or voiding posture to facilitate flow.    Consider pelvic floor muscle strengthening, such as pelvic floor muscle training, vaginal cone, bladder support or neuromodulation.    Ensure bladder emptying (e.g., intermittent catheterization, portable bladder ultrasound after voiding).         Problem: Bowel Elimination Management  Goal: Effective Bowel Elimination/Continence  Description: Develop and implement a bowel program individualized to type of bowel dysfunction, patient needs and abilities.     Incorporate positioning, natural reflexes or use of physical interventions, such as abdominal massage, digital stimulation, anal irrigation    Encourage regular timing and schedule for elimination.    Encourage fluid intake and intake of dietary fiber, such as whole grains, fruits and vegetables.    Facilitate management of hygiene and cleaning following evacuation and episodes of bowel incontinence.           Problem: Infection  Goal: Absence of Infection Signs and Symptoms  Description: Implement transmission-based precautions and isolation, as indicated, to prevent spread of infection.    Obtain cultures prior to initiating antimicrobial therapy, when possible. Do not delay treatment for laboratory results in the presence of high suspicion or clinical indicators.    Administer ordered antimicrobial therapy promptly; reassess need regularly.    Monitor laboratory value, diagnostic test and clinical status trends for signs of infection progression.    Identify early signs of sepsis, such as increased heart rate and decreased blood pressure, as well as changes in mental state, respiratory pattern or peripheral perfusion.           Problem: BADL (Basic Activities of Daily Living) Impairment  Goal: Optimal Safe BADL Performance  Description: Assess BADL (basic activity of  daily living) abilities; encourage participation at maximally safe independent level.    Provide assistance and supervision needed to maintain safety; involve caregiver in BADL (basic activity of daily living) training.    Ensure effective use of equipment or devices, such as a long-handled reacher, shower seat or orthosis.    Ensure proper body mechanics and positioning for optimal task performance.    Provide set-up of items if patient is unable to retrieve; store personal care items in accessible location.    Schedule BADL (basic activity of daily living) activities when pain and fatigue are at a minimum; pace activity to conserve energy.        Problem: Skin Injury Risk Increased  Goal: Skin Health and Integrity  Description: Perform a full pressure injury risk assessment, as indicated by screening, upon admission to care unit.    Reassess skin (full inspection and injury risk, including skin temperature, consistency and color) frequently (e.g., scheduled interval, with change in condition) to provide optimal early detection and prevention.    Maintain adequate tissue perfusion (e.g., encourage fluid balance; avoid crossing legs, constrictive clothing or devices) to promote tissue oxygenation.    Maintain head of bed at lowest degree of elevation tolerated, considering medical condition and other restrictions. Use positioning supports to prevent sliding and friction. Consider low friction textiles.    Avoid positioning onto an area that remains reddened or on bony prominences.           Problem: Malnutrition  Goal: Improved Nutritional Intake  Description: Perform a nutrition assessment; include a nutrition-focused physical exam.    Determine calorie, protein, vitamin, mineral and fluid requirements.    Assess for micronutrient deficiencies; supplement if depleted.    Assess need and assist with meal set-up and feeding.    Adjust diet or meal schedule based on preferences and tolerance.    Minimize unnecessary  dietary restrictions to increase oral intake.         Problem: Depressive Signs/Symptoms  Goal: Improved Mood Symptoms (Depressive Signs/Symptoms)  Description: Provide opportunity for patient and family/caregiver to express feelings, stressors and self-perception (e.g., verbalization, journaling).    Convey caring approach, empathy and potential for change; hope is key for recovery.    Utilize existing coping strategies and assist in developing new strategies, such as relaxation, music and problem-solving; assess for ineffective strategies (e.g., substance use, isolation).    Recognize past and present achievements, successes and personal strengths.    Empower participation in planning care and activities, as well as development of attainable goals, to increase self-esteem and feelings of control.    Promote self-care; support balanced sleep, physical activity and nutrition       Problem: Mobility Impairment  Goal: Optimal Mobility  Description: Assess mobility and encourage participation at maximally safe independent level.    Provide level of assistance and supervision needed for safety; involve caregiver in mobility training.    Ensure effective use of devices, such as cane, transfer board or orthosis.    Schedule mobility activities when pain and fatigue are at a minimum; pace activity to conserve energy.    Ensure proper body mechanics and positioning for optimal task performance.    Identify and address impairments or safety issues affecting performance, such as balance, strength or cognition.    Provide mobility and gait training with therapeutic interventions.

## 2023-08-30 NOTE — PLAN OF CARE
"Goal Outcome Evaluation:  Pt.awake and watching TV initial rounds - appeared uncomfortable in bed and BLEs nearly off R side of bed, but refused assistance to reposition, \"no, this is the only position I'm comfortable in\". Engaged in light-hearted conversation and pt.reached out to hold hand as we talked. Writer stated being worried about him sliding out of the bed in current position, informing pt.that writer would be unable to pick him up, pt.jokingly replied \"aw, c'mon, but you gotta try\". Again offered to reposition but pt.continued to refuse. Refused offer PRN pain meds and had no needs @ that time. During rounds ~0330, room dark but heard pt.say \"hello?\". Noted pt.now almost perpendicular in bed with legs dangling over the edge. Again, kept conversation light-hearted and jovial. Writer informed pt.he was just a mess now and pt.in agreement this time. Pt.asked writer for assist to sit EOB. During this time, able to put new sheet on bed. After a few minutes of sitting up EOB, pt.requested assist w/BLEs back into bed - need to move LLE slow and gently, holding behind calf and just above heel. Once back in bed, pt.indep.able to turn to (L) side using bedrail. Old sheet soiled w/urine (used urinal indep.as well) and smear BM. Buttocks, upper thighs red, mildly excoriated and itchy per pt. Thorough cleansing done and applied criticaid paste. Pt.also assisted staff w/boosting himself up in bed using upper rail. Positive reinforcement and continued encouragement for activity provided. Medicated with Tramadol @ 0400 per request. Currently comfortable and watching TV.        Patient's most recent vital signs are:     Vital signs:  BP: 138/69  Temp: 97  HR: 77  RR: 16  SpO2: 99 %     Patient does not have new respiratory symptoms.  Patient does not have new sore throat.  Patient does not have a fever greater than 99.5.                             "

## 2023-08-31 NOTE — PLAN OF CARE
Goal Outcome Evaluation:    FOCUS/GOAL    Bowel management, Bladder management, Medication management, Pain management, Wound care management, Medical management, Mobility, Skin integrity, and Safety management    ASSESSMENT, INTERVENTIONS AND CONTINUING PLAN FOR GOAL:    Pt is A&OX4, calm, & pleasant. Denied CP, SOB, & n/v. VSS & on RA. A of 2 with Liko lift for transfer; was not OOB this shift. Continent for BM, uses bedpan & LBM this shift. Urinal at the bedside. Takes meds whole with thin liquid. Managed L ankle pain with scheduled Lidocaine patch & Voltaren gel. Sacral mepilex dressing CDI. R internal jugular single lumen CVC, patent, heparin locked, & dressing CDI. Refused mepilex dressing change to the R heel. Pt ate sandwich from [employer] & no acute issue. Able to make needs known & call light within reach. Will continue with plan of care.    Patient's most recent vital signs are:     Vital signs:  BP: 119/62  Temp: 97.6  HR: 70  RR: 16  SpO2: 97 %     Patient does not have new respiratory symptoms.  Patient does not have new sore throat.  Patient does not have a fever greater than 99.5.

## 2023-08-31 NOTE — PROGRESS NOTES
VS: /62   Pulse 70   Temp 97.6  F (36.4  C) (Axillary)   Resp 16   SpO2 97%      O2: RA; CPAP on at HS; No SOB or CP reported   Output: Urinal, bedpan   Last BM: 08/30   Activity: Assist x2 w/ liko lift   Skin: DEEP   Pain: 8/10; Refused all AM meds   Neuro: A/O x4   Dressing: DEEP   Diet: Regular   LDA: CVC single lumen IJ   Equipment: Personal belongings;    Additional Info: No acute changes, pt. appeared sleeping through the night  Contact precautions maintained     Pt. Laying diagonal in bed, refused repositioning, lab, and AM meds.

## 2023-08-31 NOTE — CONSULTS
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  Underwent I&D with Dr. Meyers on 6/21/23.  Currently on prolonged oral antibiotic course.  He is transferred back to TCU on 8/28 for PT and OT.       Assessment:      Areas visualized during today's visit: Lower extremities     Pressure Injury Location: Left heel     Last photo: 8/31    Wound type: Pressure Injury     Pressure Injury Stage: Unstageable, hospital acquired, assessed with SANDRA Cook on 7/28  Wound history/plan of care:  Pt known to [facility] service. Pt known to decline cares and repositioning.   7/19 initial assessment: Spent approx 20 mins on negotiating how to move leg to be able to minimally assess wound. Pt agreed to see picture of wound. This writer explained wound to pt using the photo. Discussed with patient if he would like legs elevated on pillows or boots. He agreed to pillows.    7/20: Assessed wound with Kristen Hadley RN CWOCN. Confirmed thick slough/eschar wound base. Again discussed keeping heels off bed with pt.  7/25/2023: Of note, patient has refused and continues to refuse heel off-loading boots on assessment today.  7/28: pt had heel lift boots in place  Wound base: 100 % eschar     Palpation of the wound bed: boggy      Drainage: scant     Description of drainage: serosanguinous     Measurements (length x width x depth, in cm) 3 x 2.8 x 0.2 cm     Tunneling N/A     Undermining N/A  Periwound skin: Intact      Color: pink      Temperature:  normal   Odor: none  Pain: severe, shooting and stabbing  Pain intervention prior to dressing change: slow and gentle cares   Treatment goal: Infection control/prevention and soften nonviable tissue  STATUS: evolving  Supplies ordered: discussed with RN and discussed with patient     Wound location: left buttock and gluteal cleft    Last photo: 8/22- new photo did not download 8/31, remains similar to photo above with superficial scratch marks  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)  Wound history/plan of care: pt is incontinent and has previously refused assessment of area by C  Wound base: 100 % epidermis with scratched from fingernails, not open      Palpation of the wound bed: normal      Drainage: none     Description of drainage: none     Measurements (length x width x depth, in cm): see photo      Tunneling: N/A     Undermining: N/A  Periwound skin: Intact and Erythema- blanchable      Color: red      Temperature: normal   Odor: none  Pain: moderate, sharp- mostly related to left hip pain  Pain interventions prior to dressing change: slow and gentle cares   Treatment goal: Maintain (prevention of deterioration) and Protection  STATUS: stable  Supplies ordered: at bedside, discussed with RN, and discussed with patient        Treatment Plan:     Bilateral buttocks and gluteal cleft wounds: BID and PRN with incontinence episodes  Cleanse wound with dry wipes and martell cleanse and protect spray.   Apply light layer of Triad paste (#749880) to wounds and red skin on bilateral buttocks  With repeat application, do not scrub the paste, only remove soiled paste and reapply.  If complete removal of paste is necessary use baby oil/mineral oil (#054547) and soft wash cloth.  Ensure pt has Isaac-cushion (#405250) while sitting up in the chair.  Use only one Covidien pad in between mattress and pt. No brief in bed.    Left heel wound(s): Every other day   Strongly encourage pt to elevate heels on pillows to  float off bed surface at all times. (He agreed to this with WOC)  Cleanse wound with wound cleanser.   Apply light layer of Triad paste (#[id number]) to wound bed  Cut piece of adaptic to cover paste and wound  Secure with mepilex.  If patient agrees to heel off-loading boots, please apply.    Orders: Reviewed    RECOMMEND PRIMARY TEAM ORDER: None, at this time  Education provided: importance of repositioning, plan of care, wound progress and Infection prevention   Discussed plan of care with: Patient and Nurse  WOC nurse follow-up plan: weekly, stable POC  Notify WOC if wound(s) deteriorate.  Nursing to notify the Provider(s) and re-consult the WOC Nurse if new skin concern.    DATA:     Current support surface: Bariatric Low air loss (BIN pump, Isolibrium, Pulsate, skin guard, etc)  Containment of urine/stool: Incontinence Protocol  BMI: There is no height or weight on file to calculate BMI.   Active diet order: Orders Placed This Encounter      Regular Diet Adult     Output: I/O last 3 completed shifts:  In: 360 [P.O.:360]  Out: 350 [Urine:350]     Labs:   Recent Labs   Lab 08/29/23  0610   HGB 7.9*   WBC 9.4       Pressure injury risk assessment:   Sensory Perception: 3-->slightly limited  Moisture: 3-->occasionally moist  Activity: 1-->bedfast  Mobility: 2-->very limited  Nutrition: 2-->probably inadequate  Friction and Shear: 2-->potential problem  Rafael Score: 13    Kristen Hadley RN CWOCN  Pager no longer is use, please contact through [facility] group: [facility] Nurse [facility] [place]  Dept. Office Number: [phone]

## 2023-08-31 NOTE — PHARMACY-MEDICATION REGIMEN REVIEW
Pharmacy Medication Regimen Review  Waldo Bustos is a 72 year old male who is currently in the Transitional Care Unit.    Assessment: Upon review of the medications and patient chart the following irregularities were found:     Medications that need other dose adjustment (including age or given adverse effects from medications): Patient on apixaban for history of unprovoked DVT, he was previously on 5 mg bid but dose was reduced to 2.5 mg bid by nephrology on 7/9/23 when patient's SCr was ~3.85-4. SCr now back down to 1.95 on 8/29/23 and GFR 36 mL/min. Could consider increasing dose back to 5 mg bid if benefits outweigh risks.    Significant Drug Interactions: Multivitamin and ferrous sulfate may decrease absorption of ciprofloxacin and doxycycline,  administration to avoid interaction    Other: Ciprofloxacin and doxycycline planned through 9/13/23 (no stop date entered yet as ID recommends re-consulting them at that time if patient still admitted to TCU)    Plan:   Could consider increasing apixaban back to 5 mg bid with improved renal function  Separate administration of multivitamin and ferrous sulfate from ciprofloxacin and doxycycline  Continue current medication regimen.  Attending provider will be sent this note for review.  If there are any emergent issues noted above, pharmacist will contact provider directly by phone.      Pharmacy will periodically review the resident's medication regimen for any PRN medications not administered in > 72 hours and discontinue them. The pharmacist will discuss gradual dose reductions of psychopharmacologic medications with interdisciplinary team on a regular basis.    Please contact pharmacy if the above does not answer specific medication questions/concerns.    Background:  A pharmacist has reviewed all medications and pertinent medical history today.  Medications were reviewed for appropriate use and any irregularities found are listed with recommendations.     Beth Rivera PharmD, BCPS      Current Facility-Administered Medications:     [START ON 8/31/2023] - Skin Test Reading -, , Does not apply, Q21 Days, Sasha Posada PA    acetaminophen (TYLENOL) tablet 650 mg, 650 mg, Oral, Q4H PRN, Sasha Posada PA    acetaminophen (TYLENOL) tablet 975 mg, 975 mg, Oral, TID, Mariana Sorto CNP, 975 mg at 08/30/23 1458    amLODIPine (NORVASC) tablet 10 mg, 10 mg, Oral, Daily, Mariana Sorto CNP, 10 mg at 08/30/23 0907    apixaban ANTICOAGULANT (ELIQUIS) tablet 2.5 mg, 2.5 mg, Oral, BID, Mariaan Sorto CNP, 2.5 mg at 08/30/23 0907    atorvastatin (LIPITOR) tablet 40 mg, 40 mg, Oral, Daily, Sasha Posada PA, 40 mg at 08/30/23 0907    benzocaine-menthol (CHLORASEPTIC) 6-10 MG lozenge 1 lozenge, 1 lozenge, Buccal, Q2H PRN, Sasha Posada PA    calcium carbonate (TUMS) chewable tablet 1,000 mg, 1,000 mg, Oral, 4x Daily PRN, Sasha Posada PA    carvedilol (COREG) tablet 6.25 mg, 6.25 mg, Oral, BID w/meals, Mariana Sorto CNP, 6.25 mg at 08/30/23 1833    ciprofloxacin (CIPRO) tablet 500 mg, 500 mg, Oral, Q12H, Sasha Posada PA, 500 mg at 08/30/23 1833    diclofenac (VOLTAREN) 1 % topical gel 2 g, 2 g, Topical, 4x Daily, Sasha Posada PA, 2 g at 08/30/23 1833    docusate sodium (COLACE) capsule 100 mg, 100 mg, Oral, BID, Sasha Posada PA    doxycycline hyclate (VIBRAMYCIN) capsule 100 mg, 100 mg, Oral, Q12H, Sasha Posada PA, 100 mg at 08/30/23 1833    ferrous sulfate (FEROSUL) tablet 325 mg, 325 mg, Oral, Every Other Day, Sasha Posada PA, 325 mg at 08/30/23 1219    folic acid (FOLVITE) tablet 1 mg, 1 mg, Oral, Daily, Sasha Posada PA, 1 mg at 08/30/23 0907    heparin lock flush 10 UNIT/ML injection 5-20 mL, 5-20 mL, Intracatheter, Q24H, Juliana Dillon MD, 5 mL at 08/29/23 2054    heparin lock flush 10 UNIT/ML injection 5-20 mL, 5-20 mL, Intracatheter, Q1H PRN, Juliana Dillon,  MD, 5 mL at 08/29/23 0602    lactobacillus rhamnosus (GG) (CULTURELL) capsule 1 capsule, 1 capsule, Oral, BID, Sasha Posada PA, 1 capsule at 08/30/23 0906    Lidocaine (LIDOCARE) 4 % Patch 1 patch, 1 patch, Transdermal, Q24H, Sasha Posada PA, 1 patch at 08/30/23 1832    lidocaine (LMX4) cream, , Topical, Q1H PRN, Sasha Posada PA    melatonin tablet 3 mg, 3 mg, Oral, At Bedtime, Sasha Posada PA, 3 mg at 08/29/23 2055    methocarbamol (ROBAXIN) tablet 500 mg, 500 mg, Oral, Q6H PRN, Sasha Posada PA    miconazole (MICATIN) 2 % powder, , Topical, BID, Sasha Posada PA, Given at 08/29/23 0914    mineral oil-white petrolatum (EUCERIN/MINERIN) cream, , Topical, Q1H PRN, Sasha Posada PA    multivitamin w/minerals (THERA-VIT-M) tablet 1 tablet, 1 tablet, Oral, Daily, Sasha Posada PA, 1 tablet at 08/30/23 0907    naloxone (NARCAN) injection 0.2 mg, 0.2 mg, Intravenous, Q2 Min PRN **OR** naloxone (NARCAN) injection 0.4 mg, 0.4 mg, Intravenous, Q2 Min PRN **OR** naloxone (NARCAN) injection 0.2 mg, 0.2 mg, Intramuscular, Q2 Min PRN **OR** naloxone (NARCAN) injection 0.4 mg, 0.4 mg, Intramuscular, Q2 Min PRN, Juliana Dillon MD    nitroGLYcerin (NITROSTAT) sublingual tablet 0.4 mg, 0.4 mg, Sublingual, Q5 Min PRN, Sasha Posada PA    Nurse may request from Pharmacy a change of form of medication (e.g. Liquid to tablet)., , Does not apply, Continuous PRN, Sasha Posada PA    ondansetron (ZOFRAN ODT) ODT tab 4 mg, 4 mg, Oral, Q6H PRN **OR** ondansetron (ZOFRAN) injection 4 mg, 4 mg, Intravenous, Q6H PRN, Sasha Posada PA    polyethylene glycol (MIRALAX) Packet 17 g, 17 g, Oral, Daily, Sasha Posada PA, 17 g at 08/29/23 0915    senna-docusate (SENOKOT-S/PERICOLACE) 8.6-50 MG per tablet 1 tablet, 1 tablet, Oral, BID, Sasha Posada PA    sodium chloride (PF) 0.9% PF flush 10-20 mL, 10-20 mL, Intracatheter, q1 min prn, Juliana Dillon MD, 20 mL at 08/29/23 0609     thiamine (B-1) tablet 100 mg, 100 mg, Oral, Daily, Sasha Posada PA, 100 mg at 08/30/23 0907    traMADol (ULTRAM) half-tab 25-50 mg, 25-50 mg, Oral, Q6H PRN, Sasha Posada PA, 50 mg at 08/30/23 0410    tuberculin injection 5 Units, 5 Units, Intradermal, Q21 Days, Sasha Posada PA    Vitamin D3 (CHOLECALCIFEROL) tablet 50 mcg, 50 mcg, Oral, Daily, Sasha Posada PA, 50 mcg at 08/30/23 0906

## 2023-08-31 NOTE — H&P
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  Underwent I&D with Dr. Meyers on 6/21/23.  Currently on prolonged oral antibiotic course.  He is transferred back to TCU on 8/28 for PT and OT.       # MRSA bacteremia 2/2 indwelling drain c/b L hip septic arthritis s/p debridement and antibiotic implant retention (6/21/23)   Developed encephalopathy while previously at TCU and infectious workup started.  Blood cultures from 6/14, 6/15 positive for MRSA (4/4), and 6/16 (1/2 GPCs) and 6/17 (2/2) GPCs.  Started vancomycin 6/15.  On 6/17 synovial fluid cultures with Staph and Klebsiella. Additionally, purulent drainage noted at incision site at TCU with CT of left hip concerning for infection. KEILA did not show endocarditis.  The patient received ceftazidime and vancomycin for management of corynebacterium, Pseudomonas, and MRSA on cultures during hospitalization on [facility] [place]. ID re-evaluated patient on 8/24 and recommended continuing PO Ciprofloxacin and Doxycycline through 9/13 to complete a total of 12 weeks of antibiotics for PJI. If patient remains in TCU at that time, would recommend re-engaging with ID team regarding prolonged course of PO abx. They also recommended weekly trending CRP and ESR to monitor response to therapy.   - Orthopedics team re-consulted prior to transfer to TCU for timing of follow up and related imaging,   - PT and OT consulted; historically has been challenging to get patient to participate in therapies due to ongoing pain   - Continue Doxycycline 100mg BID through  "9/13   - Continue Cipro 500mg BID through 9/13    - Continue posterior hip precautions x 3 months (~ 9/21/23); no flexion beyond 90 degrees, adduction beyond midline, internal rotation beyond midline     # Neck pain 2/2 multilevel cervical degenerative changes C6 C7  # Left leg pain, generalized pain  Patient was ruled out for fracture and osteomyelitis with MRI. Continues to report chronic pain mostly in his neck and left leg, worsens with therapy and limits his ability to participate in rehab efforts.  Started on Gabapentin during hospitalization but stopped due to somnolence/oversedation. Discussed with patient that we could decrease dose, declined at this time    - Continue Voltaren gel QID to affected areas   - Continue Lidocaine patches to affected areas      # Worsening left ankle pain s/p fall during hospital stay   # Left foot pain 2/2 degenerative changes   # Unstageable L heel ulcer - POA, chronic    Reports ongoing left ankle and plantar foot pain since falling during hospital admission, describes it as numbness from ankle down and that his foot feels \"big\" when bearing weight.     - 3 view XR ankle ordered, final read is pending   - WOCN consult   - Pain management modalities as above      # Adjustment disorder w/ depressed mood  # Prolonged grief disorder   Ongoing 2/2 prolonged hospitalization (has been on Miappi between TCU and hospital since 11/2022).  Very depressed and flat affect on exam but appropriately conversant.  Would likely benefit from medication therapy and Psychiatry/Health Psychology evaluations.  Will gently recommend this pending TCU course.       # Anemia - Required blood transfusion last on 7/7.  Hgb has remained stable at 7-8.    - Continue PTA ferrous sulfate  - Continue PTA folate, thiamine  - CBC q Mon, Thur   - Transfuse for Hgb < 7      # Severe malnutrition in context of acute on chronic illness   - Nutrition consult pending trends in PO intake      Stable/chronic issues: " "  # HTN - Continue PTA Amlodipine 10mg and Coreg 6.25mg BID w/ hold parameters.   # HLD - Continues PTA Atorvastatin.   # Type 2 DM -   # Hx VTE - Hx of VTE in 2018, requires lifelong AC.  Continue PTA Eliquis 2.5mg BID.  # JAIR - Continue home CPAP      Resolved hospital problems:   # Multifactorial encephalopathy - In setting of sepsis at time of transfer back to hospital.  Resolved.    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  Cr 1.95.  Recently BL ~ 2.0.  Monitor BMP twice weekly as above. Avoid/minimize nephrotoxic agents.    # Asymptomatic bacteruria - UA obtained on 7/19 to assess for casts, reflexed to culture which is growing VRE. Labs and vitals with low concern for new infection. Discussed with ID who recommended against treatment.    # Hypoxemic respiratory failure 2/2 immobility, surgery - Resolved. Stable on room air.  Monitor SpO2 w/ vitals.   # Constipation - Continue scheduled bowel regimen with Senna and Miralax.   # Hypomagnesemia - Resolved.  Briefly required supplementation during prev admission.         Consulting Services:  PT, OT, Nutrition         CODE: FULL   DVT: DOAC  Diet: Regular   Indications for Psychotropic Medications: n/a   Vaccinations:   Disposition: TBD, likely will need LTC pending PT, OT evaluations         Cardiac Monitoring: None  Code Status: Full Code      Clinically Significant Risk Factors                         # Obesity: Estimated body mass index is 31.22 kg/m  as calculated from the following:    Height as of 8/23/23: 1.778 m (5' 10\").    Weight as of 8/23/23: 98.7 kg (217 lb 9.5 oz)., PRESENT ON ADMISSION            Disposition Plan           TAB PARRA MD  Hospitalist Service  [facility]  Securely message with Kelley (more info)  Text page via [facility] Paging/Directory     ______________________________________________________________________        History of Present Illness   Waldo is resting in bed.  He says that he isn't having " the best day and doesn't feel much like talking but eventually verbalizes his frustrations with his surgery.  He feels that he is in worse pain now than he was prior to his initial hospitalization.  His major complaint is his left ankle pain, and he is concerned about having sustained a fracture recently, says he fell while at the hospital about 3-4 weeks ago.  Describes it as a numbness and tingling sensation that starts at ankle and becomes worse over the bottom of his foot.         Past Medical History    Past Medical History:   Diagnosis Date    Arthritis 5 years ago    Chronic osteoarthritis Not sure    Diabetes (H) 10-12 years ago    DVT (deep venous thrombosis) (H)     Dyslipidemia     Gastroesophageal reflux disease     History of blood transfusion     Iliopsoas abscess (H)     Infection of prosthetic hip joint (H)     JAIR (obstructive sleep apnea)     Pulmonary embolism (H)     RA (rheumatoid arthritis) (H) Not sure    Reduced vision 2-3 years ago    Cataract Surgery on both eyes    Venous insufficiency        Past Surgical History   Past Surgical History:   Procedure Laterality Date    ARTHROPLASTY REVISION HIP Left 5/26/2023    Procedure: Revision Left Total Hip Arthroplasty;  Surgeon: Rom Meyers MD;  Location: [facility] OR    ARTHROPLASTY REVISION HIP Left 6/21/2023    Procedure: IRRIGATION AND DEBRIDEMENT LEFT HIP,;  Surgeon: Rom Meyers MD;  Location: [facility] OR    ASPIRATION NEEDLE HIP Left 4/7/2023    Procedure: ARTHROCENTESIS, LEFT HIP;  Surgeon: Rom Meyers MD;  Location: [facility] OR    Cataract      COLONOSCOPY      EGD      IR CVC TUNNEL PLACEMENT > 5 YRS OF AGE  6/27/2023    IR CVC TUNNEL PLACEMENT > 5 YRS OF AGE  8/1/2023    IR CVC TUNNEL REMOVAL LEFT  8/8/2023    IR FINE NEEDLE ASPIRATION W ULTRASOUND  3/6/2023    IR IVC FILTER PLACEMENT      removed    IRRIGATION AND DEBRIDEMENT HIP, PLACE ANTIBIOTIC CEMENT BEADS / SPACER Left 11/22/2022    Procedure: and Reconstruction Using G 20 Prosthetic  Antibiotic Cement Spacer;  Surgeon: Rom Meyers MD;  Location: [facility] OR    REMOVE ANTIBIOTIC CEMENT BEADS / SPACER HIP Left 5/26/2023    Procedure: Explantation of Left Hip Antibiotic Spacer;  Surgeon: Rom Meyers MD;  [facility]: [facility]    REMOVE HARDWARE ARTHROPLASTY HIP Left 11/22/2022    Procedure: Explantation of Chronic  Infected Left Total Hip Arthroplasty, Extended Trochanteric Osteotomy with Extensive Debridement;  Surgeon: Rom Meyers MD;  [facility]: [facility] OR    SYNOVIAL BIOPSY HIP Left 4/7/2023    Procedure: BIOPSY, SYNOVIUM, LEFT  HIP, percutaneous;  Surgeon: Rom Meyers MD;  Location: [facility] OR    C PELVIS/HIP JOINT SURGERY UNLISTED      [facility] [place] STOMACH SURGERY PROCEDURE UNLISTED  1954    2 Hernia surgeries as a child       Prior to Admission Medications   Prior to Admission Medications   Prescriptions Last Dose Informant Patient Reported? Taking?   Lidocaine (LIDOCARE) 4 % Patch   No No   Sig: Place 1 patch onto the skin every 24 hours To prevent lidocaine toxicity, patient should be patch free for 12 hrs daily.   Vitamin D3 (CHOLECALCIFEROL) 25 mcg (1000 units) tablet   No No   Sig: Take 2 tablets (50 mcg) by mouth daily   acetaminophen (TYLENOL) 325 MG tablet   No No   Sig: Take 2 tablets (650 mg) by mouth every 4 hours as needed for other (For optimal non-opioid multimodal pain management to improve pain control.)   amLODIPine (NORVASC) 10 MG tablet   No No   Sig: Take 1 tablet (10 mg) by mouth daily   atorvastatin (LIPITOR) 40 MG tablet   Yes No   Sig: Take 40 mg by mouth daily   carvedilol (COREG) 6.25 MG tablet   No No   Sig: Take 1 tablet (6.25 mg) by mouth 2 times daily (with meals)   ciprofloxacin (CIPRO) 500 MG tablet   No No   Sig: Take 1 tablet (500 mg) by mouth every 12 hours for 28 days   diclofenac (VOLTAREN) 1 % topical gel   No No   Sig: Apply 2 g topically 4 times daily   docusate sodium (COLACE) 100 MG capsule   Yes No   Sig: Take 100 mg by mouth 2 times daily   doxycycline  hyclate (VIBRAMYCIN) 100 MG capsule   No No   Sig: Take 1 capsule (100 mg) by mouth every 12 hours for 28 days   ferrous sulfate (FEROSUL) 325 (65 Fe) MG tablet   No No   Sig: Take 1 tablet (325 mg) by mouth every other day   folic acid (FOLVITE) 1 MG tablet   No No   Sig: Take 1 tablet (1 mg) by mouth daily   lactobacillus rhamnosus, GG, (CULTURELL) capsule   No No   Sig: Take 1 capsule by mouth 2 times daily   lidocaine (LMX4) 4 % external cream   No No   Sig: Apply topically every hour as needed for pain (with VAD insertion.)   melatonin 3 MG tablet   No No   Sig: Take 1 tablet (3 mg) by mouth At Bedtime   methocarbamol (ROBAXIN) 500 MG tablet   No No   Sig: Take 1 tablet (500 mg) by mouth every 6 hours as needed for muscle spasms   miconazole (MICATIN) 2 % external powder   No No   Sig: Apply topically 2 times daily   multivitamin w/minerals (THERA-VIT-M) tablet   No No   Sig: Take 1 tablet by mouth daily   polyethylene glycol (MIRALAX) 17 g packet   No No   Sig: Take 17 g by mouth daily   senna-docusate (SENOKOT-S/PERICOLACE) 8.6-50 MG tablet   No No   Sig: Take 1 tablet by mouth 2 times daily   thiamine (B-1) 100 MG tablet   No No   Sig: Take 1 tablet (100 mg) by mouth daily   traMADol (ULTRAM) 50 MG tablet   Yes No   Sig: Take 25-50 mg by mouth every 6 hours as needed for severe pain      Facility-Administered Medications: None           Physical Exam   Vital Signs: Temp: 97.6  F (36.4  C) Temp src: Axillary BP: 119/62 Pulse: 70   Resp: 16 SpO2: 97 % O2 Device: None (Room air)    Weight: 0 lbs 0 oz    GENERAL: Alert and oriented x 3. Well nourished, well developed.  No acute distress.  Flat affect.    HEENT: Normocephalic, atraumatic. Anicteric sclera. Mucous membranes moist.   CV: RRR. S1, S2. No murmurs appreciated.   RESPIRATORY: Effort normal on room air. Lungs CTAB with no wheezing, rales, or rhonchi.   GI: Abdomen soft and non distended, bowel sounds present x all 4 quadrants. No tenderness, rebound, or  guarding.   NEUROLOGICAL: No focal deficits. Follows commands.  Strength equal in upper and lower extremities.   MUSCULOSKELETAL: No joint swelling or tenderness. Moves all extremities.   EXTREMITIES: No gross deformities. No peripheral edema.   SKIN: Grossly warm, dry, and intact. No jaundice. No rashes    Medical Decision Making       75 MINUTES SPENT BY ME on the date of service doing chart review, history, exam, documentation & further activities per the note.      Data   ------------------------- PAST 24 HR DATA REVIEWED -----------------------------------------------        Imaging results reviewed over the past 24 hrs:   No results found for this or any previous visit (from the past 24 hour(s)).

## 2023-08-31 NOTE — PROGRESS NOTES
"   08/31/23 9901   Appointment Info   Signing Clinician's Name / Credentials (PT) Azeb Quinonez DPT   Appointment Canceled Reason (PT) Patient declined   Appointment Cancel Comments (PT) PT: upon approach, pt states that someone else approached him about doing physical therapy and he told that man that he was too sore to participate. Asked him if if was OT but he says it was someone else a couple hours ago. He thinks it might have been a  gentleman. There is no one on unit who fits that description, nor is there another PT that would have approached him today. Pt says \" what are we going to do today?\" Told him plan was to get in recliner and put steps under his L LE so that he could begin to put weight through L LE to work towards goal of standing. Pt does not wish to participate in therapy today saying \"I promise I will never refuse again\". Asked why he was refusing and it is due to a combination of pain in L LE and fatigue. Told pt I would come back tomorrow with the same plan, pt agreeable.   Rehab Comments (PT) PT: -45 min       "

## 2023-08-31 NOTE — PROGRESS NOTES
[facility]  Infectious Disease Outpatient ID Clinic Note     Patient:  Waldo Bustos, Date of birth 1950  Medical record number [id number]  Date of Visit:  9/1/2023        Assessment and Recommendations   Problem List:  MRSA bacteremia, suspected secondary to left hip PJI and associated soft tissue infection; KEILA negative for IE   Left hip PJI s/p DAIR (6/21/23 with Dr. Meyers) cultures: MRSA, C.acnes, and Pseudomonas; hardware appearance was relatively reassuring so components left in place. Nidus may have been indwelling drain.  Prior recurrent L BELLA PJI over multiple years since 2018 eventually treated with 2-stage revision with explant in 11/2022 (pre-op cx proteus mirabilis and porphyromonas, intra-op cx Proteus mirabilis and Finegoldia magna) with second-stage in 5/2023 following aspiration with negative cultures prior to hardware reinsertion   RA  Venous Insufficiency  CKD  Diabetes  Lytic lesion in the medial L ilium, measuring 3.6x5.6 cm, present since 2018       Tolerating antibiotics but appears depressed and is very tender exam today.  His left leg surgical incision is well-healed without signs of persistent infection but when I feel along his femur he winces in pain.  Patient states he is unable to lift his leg due to pain and weakness.  When I attempt to move his leg for him he is unable to tolerate due to pain.  His left heel is also now with macerated ulcer and he refused letting me examine it.  [facility] nurse consulted yesterday photographs show maceration of left heel with significant erythema and black eschar with small serosanguineous drainage.  Labs are reassuring with elevated but stable creatinine.  Inflammatory markers are normal; which is reassuring for both his left prosthetic joint infection as well as left heel pressure ulcer. Follow-up with Dr. Mercer for reassessment of need to continue antibiotics on 9/20. Patient may need suppressive antibiotics after he  "finishes his 12-week course for treatment of DAIR given retained hardware. Plan to reach out to Dr. Mercer and Dr. Meyers about significant pain patient is experiencing with his left femur.        Recommendations:  Ciprofloxacin 500 mg tablet PO twice daily  Doxycycline 100 mg twice daily  plan to extend antibiotics through to Dr. Mercer' appt on 9/20 for reassessment; will need 12 week course minimum for PJI and likely need suppressive dosing given retention of hardware   Follow up: Dr. Mercer on 9/20 at 12:30 pm at [facility]  Follow up with orthopedic surgery on 9/20      I spent 70 minutes as part of a visit on the date of the encounter doing chart review, history and exam, documentation, and care coordination.      JOSEPH Reyes, CNP  Infectious Diseases  Pager# [phone] or Kelley (preferred)          Interval History:   \"Not doing great, whole body hurts and mostly left leg from hip down to the foot.\" Pain started last Fall. \"My foot in general just aches\". Feels the pain is potentially getting worse. \"My heel in general just aches and aches\". Says there's an open sore on his left heel.    Per discharge note:  Continue posterior hip precautions x3 months (through 9/21) which means: no flexion beyond 90 degrees, adduction beyond midline, internal rotation beyond midline.     WOC note from yesterday, shows left heel with erythema and black eschar and small serosanguinous drainage. Recommending offloading heels with boots while in-bed but patient refusing (7/25), but utilizing these boot on 7/28. XR left calcaneous shows signs of subtle cortical loss at the lateral aspect of the calcaneus c/f possible acute osteomyelitis.  CT left foot (8/13) without signs of fracture or osteomyelitis , no fluid collection concerning for abscess.  Plan to hold off on confirmatory MRI and obtain inflammatory markers in 72 hours. If this symptomatology improves and inflammatory markers trended down in 72 hours then " "continue to obtain inflammatory markers with CRP once weekly to ensure that the antibiotics are covering all possibilities of bone infections including the osteomyelitis possible.     CRPs were only slightly elevated 3 weeks ago, and now normal on recheck.    How are you taking your antibiotics? Ciprofloxacin 500 mg twice daily  and Doxycyline 100 mg tablet twice daily  Any missed doses? no    Line: PICC or Port? No pain, redness, swelling, or difficulties with infusions.    Surgical site? Left leg. Denies drainage, redness. Does endorse swelling above the knee    Denies headache, fever, chills, night sweats, fatigue, sore throat, cough, dyspnea, nausea, vomiting, abdominal pain, diarrhea, dysuria, myalgias, arthralgias, lymphadenopathy, new or worsening paresthesias or neuropathy, acute rash, or new wounds.        History of Present Illness:   Per Dr. 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  Postop course complicated by profound deconditioning.  He was admitted to TCU initially on 12/23/2022 for physical rehabilitation, however therapies no longer worked with patient after several months of an in bed therapy and his refusals to mobilize.  Underwent biopsy on 4/7/2023 without evidence of infection.  He was transferred back to [facility] for left total hip arthroplasty and reimplantation on 5/26/23 with Dr. Meyers.  Presented again to [facility] for persistent MRSA bacteremia on 6/17.      Infectious diseases was previously consulted given the bacteremia secondary to the PJI. ID signed off on 7/9 as cultures were finalized growing MRSA, C. Acnes, and pseudomonas - plan for 6 weeks of antibiotic " "therapy to end on 8/1/2023. Patient has been progressively encephalopathic with rising BUN, creatinine with declining urine out put concerning that dialysis may be in his future. Care conference on 7/21 with plan for 2 weeks of dialysis to see what recovery or mental status changes may occur.     Given continued encephalopathy and rising creatinine though, UA obtained. Reflexed to culture and is positive for VRE. Currently, he has no urinary symptoms concerning for VRE causing disease. \"      Current Antimicrobials   Ciprofloxacin 8/2--> 9/13/2023  Doxycycline 8/2--> 9/13/2023        Review of Systems:     12-point ROS performed; pertinent positives and negatives per above         Medications & Allergies:     No current facility-administered medications for this visit.     No current outpatient medications on file.     Facility-Administered Medications Ordered in Other Visits   Medication    - Skin Test Reading -    acetaminophen (TYLENOL) tablet 650 mg    acetaminophen (TYLENOL) tablet 975 mg    amLODIPine (NORVASC) tablet 10 mg    apixaban ANTICOAGULANT (ELIQUIS) tablet 2.5 mg    atorvastatin (LIPITOR) tablet 40 mg    benzocaine-menthol (CHLORASEPTIC) 6-10 MG lozenge 1 lozenge    calcium carbonate (TUMS) chewable tablet 1,000 mg    carvedilol (COREG) tablet 6.25 mg    ciprofloxacin (CIPRO) tablet 500 mg    diclofenac (VOLTAREN) 1 % topical gel 2 g    docusate sodium (COLACE) capsule 100 mg    doxycycline hyclate (VIBRAMYCIN) capsule 100 mg    ferrous sulfate (FEROSUL) tablet 325 mg    folic acid (FOLVITE) tablet 1 mg    heparin lock flush 10 UNIT/ML injection 5-20 mL    heparin lock flush 10 UNIT/ML injection 5-20 mL    lactobacillus rhamnosus (GG) (CULTURELL) capsule 1 capsule    Lidocaine (LIDOCARE) 4 % Patch 1 patch    lidocaine (LMX4) cream    melatonin tablet 3 mg    methocarbamol (ROBAXIN) tablet 500 mg    miconazole (MICATIN) 2 % powder    mineral oil-white petrolatum (EUCERIN/MINERIN) cream    multivitamin " w/minerals (THERA-VIT-M) tablet 1 tablet    naloxone (NARCAN) injection 0.2 mg    Or    naloxone (NARCAN) injection 0.4 mg    Or    naloxone (NARCAN) injection 0.2 mg    Or    naloxone (NARCAN) injection 0.4 mg    nitroGLYcerin (NITROSTAT) sublingual tablet 0.4 mg    Nurse may request from Pharmacy a change of form of medication (e.g. Liquid to tablet).    ondansetron (ZOFRAN ODT) ODT tab 4 mg    Or    ondansetron (ZOFRAN) injection 4 mg    polyethylene glycol (MIRALAX) Packet 17 g    senna-docusate (SENOKOT-S/PERICOLACE) 8.6-50 MG per tablet 1 tablet    sodium chloride (PF) 0.9% PF flush 10-20 mL    thiamine (B-1) tablet 100 mg    traMADol (ULTRAM) half-tab 25-50 mg    tuberculin injection 5 Units    Vitamin D3 (CHOLECALCIFEROL) tablet 50 mcg         Allergies   Allergen Reactions    Sulfa Antibiotics      Other reaction(s): *Unknown - Childhood Rxn    Tizanidine Other (See Comments)     Frequent urination; causes drowsiness, and dry mouth              Physical Exam:     /74 (BP Location: Right arm, Patient Position: Sitting, Cuff Size: Adult Regular)   Pulse 70   Temp 97.8  F (36.6  C) (Oral)   SpO2 98%     Vitals:       Physical Examination:  GENERAL:  well-appearing. In no acute distress.  Laying on exam table and covering face with blanket  HEAD:  Head is normocephalic, atraumatic   EYES:  Eyes have anicteric sclerae without conjunctival injection.   ENT:  Oropharynx is moist. Tongue is midline.   NECK:  Supple.   LUNGS: Lungs clear throughout, no crackles or wheezes.  Breathing comfortably on room air. No accessory muscle use.    CARDIOVASCULAR: Heart RRR, no murmurs.  Skin is warm, but pale  Abdomen: Soft and nontender, no hepatosplenomegaly.  SKIN: Left lateral thigh incision well-healed, no areas of erythema, dehiscence, crepitus, or fluctuance.  Left heel with Mepilex in place and patient refuses examination.  See walk note from 8/31/2023 for images and description.  No acute rashes with visible  skin.    EXTREMITIES: Left femur tender along entire surface on palpation.  ROM deferred due to intolerance of passive range of motion and posterior hip precautions through 9/21, and patient states unable to actively move his left leg.  No joint erythema or swelling.   NEUROLOGIC:  Grossly nonfocal.       Microbiology Data   Pertinent Micro:    Culture   Date Value Ref Range Status   07/19/2023 10,000-50,000 CFU/mL Enterococcus faecium VRE (A)  Final     Comment:          06/21/2023 1+ Cutibacterium (Propionibacterium) acnes (A)  Final     Comment:     Susceptibility testing of Cutibacterium (Propionibacterium) species is not done from this source. This organism is susceptible to penicillin and cefotaxime and most are susceptible to clindamycin.   06/21/2023 No anaerobic organisms isolated  Final   06/21/2023 No anaerobic organisms isolated  Final   06/21/2023 No anaerobic organisms isolated  Final   06/21/2023 1+ Staphylococcus aureus (A)  Final     Comment:     Susceptibilities done on previous cultures   06/21/2023 1+ Pseudomonas aeruginosa (A)  Final   06/21/2023 1+ Staphylococcus aureus MRSA (A)  Final   06/21/2023 2+ Staphylococcus aureus (A)  Final     Comment:     Susceptibilities done on previous cultures   06/21/2023 1+ Pseudomonas aeruginosa (A)  Final     Comment:     Susceptibilities done on previous cultures   06/21/2023 1+ Staphylococcus aureus (A)  Final     Comment:     Susceptibilities done on previous cultures   06/21/2023 2+ Staphylococcus aureus (A)  Final     Comment:     Susceptibilities done on previous cultures   06/21/2023 No anaerobic organisms isolated  Final   06/21/2023 2+ Staphylococcus aureus MRSA (A)  Final   06/20/2023 No Growth  Final   06/20/2023 No Growth  Final   06/19/2023 No Growth  Final   06/19/2023 No Growth  Final   06/17/2023 4+ Staphylococcus aureus MRSA (A)  Final   06/17/2023 2+ Corynebacterium striatum (A)  Final     Comment:     Identification obtained by MALDI-TOF  mass spectrometry research use only database. Test characteristics determined and verified by the Infectious Diseases Diagnostic Laboratory.   06/17/2023 1+ Klebsiella pneumoniae (A)  Final   06/17/2023 No anaerobic organisms isolated  Final       Last check of C difficile  No results found for: CDBPCT    Urine Studies     Recent Labs   Lab Test 07/19/23  0957 06/30/23  2041 06/22/23  1457 06/17/23  0817   URINEPH 5.5 5.0 5.0 5.0   NITRITE Negative Negative Negative Negative   LEUKEST Small* Small* Large* Moderate*   WBCU 27* 3 66* 14*       CSF testing   No lab results found.    Invalid input(s): CADAM, EVPCR, ENTPCR, ENTEROVIRUS    Laboratory Data:   Metabolic Studies       Recent Labs   Lab Test 08/31/23  0840 08/29/23  0610 08/28/23  0901 08/28/23  0854 08/27/23  1202 08/27/23  0932 08/13/23  0404 08/12/23  1217 08/09/23  1543 08/09/23  0803 06/16/23  0820 06/16/23  0605    138  --   --   --   --    < > 139   < > 142   < > 141   POTASSIUM 4.4 4.5  --  4.5  --  4.2   < > 4.0   < > 3.6   < > 4.2   CHLORIDE 105 105  --   --   --   --    < > 105   < > 106   < > 104   CO2 24 26  --   --   --   --    < > 24   < > 25   < > 24   ANIONGAP 10 7  --   --   --   --    < > 10   < > 11   < > 13   BUN 32.7* 31.7*  --   --   --   --    < > 40.4*   < > 38.9*   < > 27.7*   CR 2.01* 1.95*  --  2.04*  --   --    < > 2.40*   < > 2.82*   < > 1.14   GFRESTIMATED 35* 36*  --  34*  --   --    < > 28*   < > 23*   < > 69   * 95   < >  --    < >  --    < > 128*   < > 129*   < > 171*   A1C  --   --   --   --   --   --   --   --   --   --   --  5.6   MAIKOL 8.8 8.7*  --   --   --   --    < > 8.4*   < > 8.3*   < > 8.3*   PHOS  --   --   --   --   --  3.7   < > 4.1   < > 3.8   < >  --    MAG  --   --   --  1.8  --   --    < > 1.4*   < > 1.7   < > 1.7   LACT  --   --   --   --   --   --   --  0.7  --  0.6*  --   --    PCAL  --   --   --   --   --   --   --  0.23*  --  0.27*  --   --     < > = values in this interval not displayed.        Hepatic Studies    Recent Labs   Lab Test 08/23/23  1124 08/21/23  1043 08/18/23  0815 07/06/23  1708 07/06/23  0713   BILITOTAL 0.3 0.3 0.3   < > <0.2   DBIL  --   --   --   --  <0.20   ALKPHOS 73 74 76   < > 103   PROTTOTAL 6.1* 6.3* 6.4   < > 5.3*   ALBUMIN 3.3* 3.4* 3.2*   < > 2.4*  2.4*   AST 19 17 18   < > 9   ALT 15 15 14   < > 12    < > = values in this interval not displayed.       Hematology Studies      Recent Labs   Lab Test 08/29/23  0610 08/26/23  1124 08/23/23  1124 08/21/23  1043 08/18/23  0815 08/15/23  0856   WBC 9.4 8.2 7.8 7.5 7.9 6.6   HGB 7.9* 7.5* 7.5* 7.7* 7.5* 7.0*   HCT 24.3* 23.3* 23.7* 23.8* 23.7* 22.2*    274 267 272 295 253       Medication levels    Recent Labs   Lab Test 07/27/23  0803   VANCOMYCIN 21.3       Inflammatory Markers    Recent Labs   Lab Test 08/26/23  1124 08/12/23  1217 08/09/23  0803 06/26/23  0513 02/06/23  0839 01/30/23  0835 01/23/23  0748 01/16/23  0748 01/09/23  1212 01/02/23  0834 12/28/22  0615 12/19/22  0905   SED 77* 92* 116* 1  --   --   --   --  77*  --   --  86*   CRP  --   --   --   --  3.6 <2.9 <2.9 <2.9 <2.9 5.3   < > 6.1    < > = values in this interval not displayed.       Imaging:  Results for orders placed or performed during the hospital encounter of 08/28/23   XR Ankle Port Left G/E 3 Views    Narrative    EXAM: XR ANKLE PORT LEFT G/E 3 VIEWS  LOCATION: [facility] TRANSITIONAL CARE  DATE: 8/29/2023    INDICATION: Ongoing ankle pain s p fall  COMPARISON: 08/12/2023      Impression    IMPRESSION: Osteopenia. No fractures are evident. The ankle mortise is intact. The talar dome is unremarkable. Mild degenerative changes in the ankle and posterior subtalar joints. Plantar and Achilles calcaneal spurs. Atheromatous calcification.   Findings are stable.

## 2023-08-31 NOTE — PROGRESS NOTES
"PT Weekly update    Since admission pt has been scheduled for three days of physical therapy. First day pt refused evaluation. Please see physical therapy note from 8/29 for details. Yesterday pt participated fully with physical therapy evaluation and treatment which included getting into bed side recliner using overhead ceiling lift and A x 2. Throughout session pt had minimal tolerance for movement of left lower extremity, requiring slow movements with support under left calf. Once in chair pt tolerates only small PROM to left hip due to pain limitations. Goal for today's session was to encourage pt to bear weight in sitting through left lower extremity. Upon approach today pt politely refuses physical therapy session stating he will \"never refuse again\". If patient is going to progress towards goals he needs to be more motivated to participate in therapy and be willing to try to move even though it might initially be more painful. Patient would likely benefit from a chronic pain clinic consultation given his ongoing pain presentation over several months.  "

## 2023-08-31 NOTE — PLAN OF CARE
Goal Outcome Evaluation:       0745: Pt laying diagonal in bed. Writer approach patient to repostioned but pt delined. Offer pain meds and morning meds but pt declined.  0830: NA and writer approach patient again but declined meds and positioning. Does not even wanted to be touch for repositioning.  0910: Continues refusing any cares.  1100: approach by RN and NA again. Agreed to do cares. Linen changed (soiled with urine and smear of BM.). Daria cares, clothing change, partial sponge bath was done. L heel unable to touch patient declined but was able to hold the pillow up from behind BLE and found a wound on L heel. Put 2 pillows behind L heel, cleanse area with wound cleanser, no sting to surrounding wound, sacral mepilex applied . Buttocks and groin is also excoriated- informed Provider Mariana for WOCN re-consult (these were not new and were present at the hospital)    Took his meds including tramadol except the bowel meds and cream  RIJ dressing CDI. See blood result today.  WOCN came and assess pts wound.  Will continue plan of care.  Vital signs:  Temp: 97.5  F (36.4  C) Temp src: Axillary BP: 133/59 Pulse: 72   Resp: 16 SpO2: 96 % O2 Device: None (Room air)

## 2023-08-31 NOTE — H&P
H&P by Obed Andrade MD was reviewed. I agree with assessment and plan as documented.       Mariana Sorto CNP, APRN  Internal Medicine [facility]  Pager [phone]

## 2023-09-01 NOTE — PLAN OF CARE
Goal Outcome Evaluation:    FOCUS/GOAL     Bowel management, Bladder management, Medication management, Pain management, Wound care management, Medical management, Mobility, Skin integrity, and Safety management     ASSESSMENT, INTERVENTIONS AND CONTINUING PLAN FOR GOAL:     Pt is A&OX4, calm, & pleasant. Denied CP, SOB, & n/v. VSS & on RA. A of 2 with Liko lift for transfer; was not OOB this shift. Continent for BM, uses bedpan & LBM this shift. Urinal at the bedside. Takes meds whole with thin liquid. Managed L ankle pain with scheduled Lidocaine patch & Voltaren gel. Sacral mepilex dressing CDI. R internal jugular single lumen CVC, patent, heparin locked, & dressing CDI. Mepilex dressing to the R heel CDI. Pt ate dinner with good appetite & no acute issue. Able to make needs known & call light within reach. Will continue with POC.    Patient's most recent vital signs are:     Vital signs:  BP: 120/59  Temp: 97.3  HR: 70  RR: 16  SpO2: 97 %     Patient does not have new respiratory symptoms.  Patient does not have new sore throat.  Patient does not have a fever greater than 99.5.

## 2023-09-01 NOTE — LETTER
9/1/2023       RE: Waldo Bustos  [address]  [facility] [address]     Dear Colleague,    Thank you for referring your patient, Waldo Bustos, to the [facility] INFECTI[facility] DISE[facility] [place] at [facility]. Please see a copy of my visit note below.    [facility]  Infectious Disease Outpatient ID Clinic Note     Patient:  Waldo Bustos, Date of birth 1950  Medical record number [id number]  Date of Visit:  9/1/2023        Assessment and Recommendations   Problem List:  MRSA bacteremia, suspected secondary to left hip PJI and associated soft tissue infection; KEILA negative for IE   Left hip PJI s/p DAIR (6/21/23 with Dr. Meyers) cultures: MRSA, C.acnes, and Pseudomonas; hardware appearance was relatively reassuring so components left in place. Nidus may have been indwelling drain.  Prior recurrent L BELLA PJI over multiple years since 2018 eventually treated with 2-stage revision with explant in 11/2022 (pre-op cx proteus mirabilis and porphyromonas, intra-op cx Proteus mirabilis and Finegoldia magna) with second-stage in 5/2023 following aspiration with negative cultures prior to hardware reinsertion   RA  Venous Insufficiency  CKD  Diabetes  Lytic lesion in the medial L ilium, measuring 3.6x5.6 cm, present since 2018       Tolerating antibiotics but appears depressed and is very tender exam today.  His left leg surgical incision is well-healed without signs of persistent infection but when I feel along his femur he winces in pain.  Patient states he is unable to lift his leg due to pain and weakness.  When I attempt to move his leg for him he is unable to tolerate due to pain.  His left heel is also now with macerated ulcer and he refused letting me examine it.  WO nurse consulted yesterday photographs show maceration of left heel with significant erythema and black eschar with small serosanguineous drainage.  Labs  "are reassuring with elevated but stable creatinine.  Inflammatory markers are normal; which is reassuring for both his left prosthetic joint infection as well as left heel pressure ulcer. Follow-up with Dr. Mercer for reassessment of need to continue antibiotics on 9/20. Patient may need suppressive antibiotics after he finishes his 12-week course for treatment of DAIR given retained hardware. Plan to reach out to Dr. Mercer and Dr. Meyers about significant pain patient is experiencing with his left femur.        Recommendations:  Ciprofloxacin 500 mg tablet PO twice daily  Doxycycline 100 mg twice daily  plan to extend antibiotics through to Dr. Mercer' appt on 9/20 for reassessment; will need 12 week course minimum for PJI and likely need suppressive dosing given retention of hardware   Follow up: Dr. Mercer on 9/20 at 12:30 pm at [facility]  Follow up with orthopedic surgery on 9/20      I spent 70 minutes as part of a visit on the date of the encounter doing chart review, history and exam, documentation, and care coordination.      JOSEPH Reyes, CNP  Infectious Diseases  Pager# [phone] or Vocera (preferred)          Interval History:   \"Not doing great, whole body hurts and mostly left leg from hip down to the foot.\" Pain started last Fall. \"My foot in general just aches\". Feels the pain is potentially getting worse. \"My heel in general just aches and aches\". Says there's an open sore on his left heel.    Per discharge note:  Continue posterior hip precautions x3 months (through 9/21) which means: no flexion beyond 90 degrees, adduction beyond midline, internal rotation beyond midline.     WOC note from yesterday, shows left heel with erythema and black eschar and small serosanguinous drainage. Recommending offloading heels with boots while in-bed but patient refusing (7/25), but utilizing these boot on 7/28. XR left calcaneous shows signs of subtle cortical loss at the lateral aspect of the " "calcaneus c/f possible acute osteomyelitis.  CT left foot (8/13) without signs of fracture or osteomyelitis , no fluid collection concerning for abscess.  Plan to hold off on confirmatory MRI and obtain inflammatory markers in 72 hours. If this symptomatology improves and inflammatory markers trended down in 72 hours then continue to obtain inflammatory markers with CRP once weekly to ensure that the antibiotics are covering all possibilities of bone infections including the osteomyelitis possible.     CRPs were only slightly elevated 3 weeks ago, and now normal on recheck.    How are you taking your antibiotics? Ciprofloxacin 500 mg twice daily  and Doxycyline 100 mg tablet twice daily  Any missed doses? no    Line: PICC or Port? No pain, redness, swelling, or difficulties with infusions.    Surgical site? Left leg. Denies drainage, redness. Does endorse swelling above the knee    Denies headache, fever, chills, night sweats, fatigue, sore throat, cough, dyspnea, nausea, vomiting, abdominal pain, diarrhea, dysuria, myalgias, arthralgias, lymphadenopathy, new or worsening paresthesias or neuropathy, acute rash, or new wounds.        History of Present Illness:   Per Dr. 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  Postop course complicated by profound deconditioning.  He was admitted to TCU initially on 12/23/2022 for physical rehabilitation, however therapies no longer worked with patient after several months of an in bed therapy and his refusals to mobilize.  Underwent biopsy on 4/7/2023 without evidence of infection.  He was transferred back to [facility] for left total hip arthroplasty and " "reimplantation on 5/26/23 with Dr. Meyers.  Presented again to [place] for persistent MRSA bacteremia on 6/17.      Infectious diseases was previously consulted given the bacteremia secondary to the PJI. ID signed off on 7/9 as cultures were finalized growing MRSA, C. Acnes, and pseudomonas - plan for 6 weeks of antibiotic therapy to end on 8/1/2023. Patient has been progressively encephalopathic with rising BUN, creatinine with declining urine out put concerning that dialysis may be in his future. Care conference on 7/21 with plan for 2 weeks of dialysis to see what recovery or mental status changes may occur.     Given continued encephalopathy and rising creatinine though, UA obtained. Reflexed to culture and is positive for VRE. Currently, he has no urinary symptoms concerning for VRE causing disease. \"      Current Antimicrobials   Ciprofloxacin 8/2--> 9/13/2023  Doxycycline 8/2--> 9/13/2023        Review of Systems:     12-point ROS performed; pertinent positives and negatives per above         Medications & Allergies:     No current facility-administered medications for this visit.     No current outpatient medications on file.     Facility-Administered Medications Ordered in Other Visits   Medication    - Skin Test Reading -    acetaminophen (TYLENOL) tablet 650 mg    acetaminophen (TYLENOL) tablet 975 mg    amLODIPine (NORVASC) tablet 10 mg    apixaban ANTICOAGULANT (ELIQUIS) tablet 2.5 mg    atorvastatin (LIPITOR) tablet 40 mg    benzocaine-menthol (CHLORASEPTIC) 6-10 MG lozenge 1 lozenge    calcium carbonate (TUMS) chewable tablet 1,000 mg    carvedilol (COREG) tablet 6.25 mg    ciprofloxacin (CIPRO) tablet 500 mg    diclofenac (VOLTAREN) 1 % topical gel 2 g    docusate sodium (COLACE) capsule 100 mg    doxycycline hyclate (VIBRAMYCIN) capsule 100 mg    ferrous sulfate (FEROSUL) tablet 325 mg    folic acid (FOLVITE) tablet 1 mg    heparin lock flush 10 UNIT/ML injection 5-20 mL    heparin lock flush 10 " UNIT/ML injection 5-20 mL    lactobacillus rhamnosus (GG) (CULTURELL) capsule 1 capsule    Lidocaine (LIDOCARE) 4 % Patch 1 patch    lidocaine (LMX4) cream    melatonin tablet 3 mg    methocarbamol (ROBAXIN) tablet 500 mg    miconazole (MICATIN) 2 % powder    mineral oil-white petrolatum (EUCERIN/MINERIN) cream    multivitamin w/minerals (THERA-VIT-M) tablet 1 tablet    naloxone (NARCAN) injection 0.2 mg    Or    naloxone (NARCAN) injection 0.4 mg    Or    naloxone (NARCAN) injection 0.2 mg    Or    naloxone (NARCAN) injection 0.4 mg    nitroGLYcerin (NITROSTAT) sublingual tablet 0.4 mg    Nurse may request from Pharmacy a change of form of medication (e.g. Liquid to tablet).    ondansetron (ZOFRAN ODT) ODT tab 4 mg    Or    ondansetron (ZOFRAN) injection 4 mg    polyethylene glycol (MIRALAX) Packet 17 g    senna-docusate (SENOKOT-S/PERICOLACE) 8.6-50 MG per tablet 1 tablet    sodium chloride (PF) 0.9% PF flush 10-20 mL    thiamine (B-1) tablet 100 mg    traMADol (ULTRAM) half-tab 25-50 mg    tuberculin injection 5 Units    Vitamin D3 (CHOLECALCIFEROL) tablet 50 mcg         Allergies   Allergen Reactions    Sulfa Antibiotics      Other reaction(s): *Unknown - Childhood Rxn    Tizanidine Other (See Comments)     Frequent urination; causes drowsiness, and dry mouth              Physical Exam:     /74 (BP Location: Right arm, Patient Position: Sitting, Cuff Size: Adult Regular)   Pulse 70   Temp 97.8  F (36.6  C) (Oral)   SpO2 98%     Vitals:       Physical Examination:  GENERAL:  well-appearing. In no acute distress.  Laying on exam table and covering face with blanket  HEAD:  Head is normocephalic, atraumatic   EYES:  Eyes have anicteric sclerae without conjunctival injection.   ENT:  Oropharynx is moist. Tongue is midline.   NECK:  Supple.   LUNGS: Lungs clear throughout, no crackles or wheezes.  Breathing comfortably on room air. No accessory muscle use.    CARDIOVASCULAR: Heart RRR, no murmurs.  Skin is  warm, but pale  Abdomen: Soft and nontender, no hepatosplenomegaly.  SKIN: Left lateral thigh incision well-healed, no areas of erythema, dehiscence, crepitus, or fluctuance.  Left heel with Mepilex in place and patient refuses examination.  See walk note from 8/31/2023 for images and description.  No acute rashes with visible skin.    EXTREMITIES: Left femur tender along entire surface on palpation.  ROM deferred due to intolerance of passive range of motion and posterior hip precautions through 9/21, and patient states unable to actively move his left leg.  No joint erythema or swelling.   NEUROLOGIC:  Grossly nonfocal.       Microbiology Data   Pertinent Micro:    Culture   Date Value Ref Range Status   07/19/2023 10,000-50,000 CFU/mL Enterococcus faecium VRE (A)  Final     Comment:          06/21/2023 1+ Cutibacterium (Propionibacterium) acnes (A)  Final     Comment:     Susceptibility testing of Cutibacterium (Propionibacterium) species is not done from this source. This organism is susceptible to penicillin and cefotaxime and most are susceptible to clindamycin.   06/21/2023 No anaerobic organisms isolated  Final   06/21/2023 No anaerobic organisms isolated  Final   06/21/2023 No anaerobic organisms isolated  Final   06/21/2023 1+ Staphylococcus aureus (A)  Final     Comment:     Susceptibilities done on previous cultures   06/21/2023 1+ Pseudomonas aeruginosa (A)  Final   06/21/2023 1+ Staphylococcus aureus MRSA (A)  Final   06/21/2023 2+ Staphylococcus aureus (A)  Final     Comment:     Susceptibilities done on previous cultures   06/21/2023 1+ Pseudomonas aeruginosa (A)  Final     Comment:     Susceptibilities done on previous cultures   06/21/2023 1+ Staphylococcus aureus (A)  Final     Comment:     Susceptibilities done on previous cultures   06/21/2023 2+ Staphylococcus aureus (A)  Final     Comment:     Susceptibilities done on previous cultures   06/21/2023 No anaerobic organisms isolated  Final    06/21/2023 2+ Staphylococcus aureus MRSA (A)  Final   06/20/2023 No Growth  Final   06/20/2023 No Growth  Final   06/19/2023 No Growth  Final   06/19/2023 No Growth  Final   06/17/2023 4+ Staphylococcus aureus MRSA (A)  Final   06/17/2023 2+ Corynebacterium striatum (A)  Final     Comment:     Identification obtained by MALDI-TOF mass spectrometry research use only database. Test characteristics determined and verified by the Infectious Diseases Diagnostic Laboratory.   06/17/2023 1+ Klebsiella pneumoniae (A)  Final   06/17/2023 No anaerobic organisms isolated  Final       Last check of C difficile  No results found for: CDBPCT    Urine Studies     Recent Labs   Lab Test 07/19/23  0957 06/30/23  2041 06/22/23  1457 06/17/23  0817   URINEPH 5.5 5.0 5.0 5.0   NITRITE Negative Negative Negative Negative   LEUKEST Small* Small* Large* Moderate*   WBCU 27* 3 66* 14*       CSF testing   No lab results found.    Invalid input(s): CADAM, EVPCR, ENTPCR, ENTEROVIRUS    Laboratory Data:   Metabolic Studies       Recent Labs   Lab Test 08/31/23  0840 08/29/23  0610 08/28/23  0901 08/28/23  0854 08/27/23  1202 08/27/23  0932 08/13/23  0404 08/12/23  1217 08/09/23  1543 08/09/23  0803 06/16/23  0820 06/16/23  0605    138  --   --   --   --    < > 139   < > 142   < > 141   POTASSIUM 4.4 4.5  --  4.5  --  4.2   < > 4.0   < > 3.6   < > 4.2   CHLORIDE 105 105  --   --   --   --    < > 105   < > 106   < > 104   CO2 24 26  --   --   --   --    < > 24   < > 25   < > 24   ANIONGAP 10 7  --   --   --   --    < > 10   < > 11   < > 13   BUN 32.7* 31.7*  --   --   --   --    < > 40.4*   < > 38.9*   < > 27.7*   CR 2.01* 1.95*  --  2.04*  --   --    < > 2.40*   < > 2.82*   < > 1.14   GFRESTIMATED 35* 36*  --  34*  --   --    < > 28*   < > 23*   < > 69   * 95   < >  --    < >  --    < > 128*   < > 129*   < > 171*   A1C  --   --   --   --   --   --   --   --   --   --   --  5.6   MAIKOL 8.8 8.7*  --   --   --   --    < > 8.4*   < >  8.3*   < > 8.3*   PHOS  --   --   --   --   --  3.7   < > 4.1   < > 3.8   < >  --    MAG  --   --   --  1.8  --   --    < > 1.4*   < > 1.7   < > 1.7   LACT  --   --   --   --   --   --   --  0.7  --  0.6*  --   --    PCAL  --   --   --   --   --   --   --  0.23*  --  0.27*  --   --     < > = values in this interval not displayed.       Hepatic Studies    Recent Labs   Lab Test 08/23/23  1124 08/21/23  1043 08/18/23  0815 07/06/23  1708 07/06/23  0713   BILITOTAL 0.3 0.3 0.3   < > <0.2   DBIL  --   --   --   --  <0.20   ALKPHOS 73 74 76   < > 103   PROTTOTAL 6.1* 6.3* 6.4   < > 5.3*   ALBUMIN 3.3* 3.4* 3.2*   < > 2.4*  2.4*   AST 19 17 18   < > 9   ALT 15 15 14   < > 12    < > = values in this interval not displayed.       Hematology Studies      Recent Labs   Lab Test 08/29/23  0610 08/26/23  1124 08/23/23  1124 08/21/23  1043 08/18/23  0815 08/15/23  0856   WBC 9.4 8.2 7.8 7.5 7.9 6.6   HGB 7.9* 7.5* 7.5* 7.7* 7.5* 7.0*   HCT 24.3* 23.3* 23.7* 23.8* 23.7* 22.2*    274 267 272 295 253       Medication levels    Recent Labs   Lab Test 07/27/23  0803   VANCOMYCIN 21.3       Inflammatory Markers    Recent Labs   Lab Test 08/26/23  1124 08/12/23  1217 08/09/23  0803 06/26/23  0513 02/06/23  0839 01/30/23  0835 01/23/23  0748 01/16/23  0748 01/09/23  1212 01/02/23  0834 12/28/22  0615 12/19/22  0905   SED 77* 92* 116* 1  --   --   --   --  77*  --   --  86*   CRP  --   --   --   --  3.6 <2.9 <2.9 <2.9 <2.9 5.3   < > 6.1    < > = values in this interval not displayed.       Imaging:  Results for orders placed or performed during the hospital encounter of 08/28/23   XR Ankle Port Left G/E 3 Views    Narrative    EXAM: XR ANKLE PORT LEFT G/E 3 VIEWS  LOCATION: [facility] TRANSITIONAL [facility]ARE  DATE: 8/29/2023    INDICATION: Ongoing ankle pain s p fall  COMPARISON: 08/12/2023      Impression    IMPRESSION: Osteopenia. No fractures are evident. The ankle mortise is intact. The talar dome is unremarkable. Mild  degenerative changes in the ankle and posterior subtalar joints. Plantar and Achilles calcaneal spurs. Atheromatous calcification.   Findings are stable.       JOSEPH Reyes CNP

## 2023-09-01 NOTE — PROGRESS NOTES
MDS Pain Assessment    The following is the pain interview as conducted by the TCU RN caring for the patient on September / 01 / 2023. This assessment is required by the [facility] for all patients in [place] SNF (Skilled Nursing Facilities).       1. Have you had pain or hurting at any time in the last 5 days? Yes    2. How much of the time have you experienced pain or hurting over the last 5 days? almost constantly    3. Over the past 5 days, has pain made it hard for you to sleep at night? Yes    4. Over the past 5 days, have you limited your day-to-day activities because of pain? Yes    5. Pain intensity (Numeric scale used first-if patient unable to answer, verbal scale to be used.)    Numeric Scale: Please rate your worst pain over the last 5 days on a zero to ten scale, with zero being no pain and ten being the worst pain you can imagine.     8    Verbal Scale: USED ONLY if unable to give numeric, otherwise N/A  Please rate the intensity of your worst pain over the last 5 days.     not applicable

## 2023-09-01 NOTE — NURSING NOTE
Chief Complaint   Patient presents with    RECHECK   /74 (BP Location: Right arm, Patient Position: Sitting, Cuff Size: Adult Regular)   Pulse 70   Temp 97.8  F (36.6  C) (Oral)   SpO2 98% Cindy Sierra on 9/1/2023 at 11:10 AM

## 2023-09-01 NOTE — PLAN OF CARE
Goal Outcome Evaluation:    FOCUS/GOAL     Bowel management, Bladder management, Medication management, Pain management, Wound care management, Medical management, Mobility, Skin integrity, and Safety management     ASSESSMENT, INTERVENTIONS AND CONTINUING PLAN FOR GOAL:     Pt is A&OX4, calm, & pleasant. Denied CP, SOB, & n/v. A of 2 with Liko lift for transfer; was not OOB this shift. Continent for BM, uses bedpan. Urinal at the bedside. Takes meds whole with thin liquid. Sacral mepilex dressing CDI. R internal jugular single lumen CVC, patent, NS flushed, & dressing CDI. Mepilex dressing to the R heel CDI. Pt slept well for most of the shift & no acute issue. Able to make needs known & call light within reach. Will continue with plan of care.    Patient's most recent vital signs are:     Vital signs:  BP: 120/59  Temp: 97.3  HR: 70  RR: 16  SpO2: 97 %     Patient does not have new respiratory symptoms.  Patient does not have new sore throat.  Patient does not have a fever greater than 99.5.

## 2023-09-01 NOTE — PATIENT INSTRUCTIONS
Ciprofloxacin 500 mg tablet PO twice daily  Doxycycline 100 mg twice daily  plan to extend antibiotics through to Dr. Mercer' appt on 9/20 for reassessment; will need 12 week course minimum for PJI and likely need suppressive dosing given retention of hardware   Follow up: Dr. Mercer on 9/20 at 12:30 pm at [facility]  Follow up with orthopedic surgery on 9/20

## 2023-09-01 NOTE — PLAN OF CARE
Goal Outcome Evaluation:    VS: /60   Pulse 75   Temp 97.7  F (36.5  C) (Axillary)   Resp 16   SpO2 97%    O2: RA   Output: Urinal; staff empties   Last BM: 9/1 on bedpan   Activity: Not OOB  Left unit for ID appt via stretcher. A-4 to xfer to stretcher.   LLE WBAT  Pt refused therapy   Skin: Left hip  R heel  L heel wound  Sacral wound  Right internal jugular CVC   Pain: Tramadol x1  Pt refuses touch DT pain; pt does not move LLE.   CMS:  Neuro: Pt does not move LE   A&O   Dressing: R heel CDI  L heel CDI  Sacral dsg    Diet: Reg   LDA: R internal jugular CVC   Equipment: Liko, bedpan, Bariatric bed, recliner   Plan: Con't POC.    Additional Info: Pt's personal belongings were kept on TCU when pt went to hospital for surgery. Personal belongings have been placed in pt's room. Pt happy to have them again.      Patient's most recent vital signs are:     Vital signs:  BP: 120/60  Temp: 97.7  HR: 75  RR: 16  SpO2: 97 %     Patient does not have new respiratory symptoms.  Patient does not have new sore throat.  Patient does not have a fever greater than 99.5.

## 2023-09-02 NOTE — PLAN OF CARE
Pt is alert and oriented x 4. Calm and co-operative. Pt refused lidocaine patch. Denied SOB, Chest pain, N/V. Pt was sitting in recliner for couple of hours and transferred to bed  A/2 with liko lift. Uses urinal bed side and bed pan for BM. No BM in this shift. Pt refused to change L ankle dressing. Sacral mepilex dressing CDI. CVC single lumen on R internal jugular , patent , and heparin locked, dressing CDI. Call light with in reach. Continue with POC.    Patient's most recent vital signs are:     Vital signs:  BP: 119/55  Temp: 98  HR: 61  RR: 16  SpO2: 98 %     Patient does not have new respiratory symptoms.  Patient does not have new sore throat.  Patient does not have a fever greater than 99.5.

## 2023-09-02 NOTE — PLAN OF CARE
Goal Outcome Evaluation:    Overall Patient Progress: no change    Patient is alert and oriented x4. No reported shortness of breath, chest pain or discomfort. Able to make needs known. Continent of bladder. Urinal at bedside emptied by staffs. A-2 with liko lift for transfer. Right chest CVC intact. Mepilex on heel clean, dry and intact. Had an ID appointment yesterday (see recommendation/note). PO antibiotics given. No acute issue during shift. Patient slept well overnight. Comfortable in bed during safety rounds. Call light within reach. Continue with the plan of care.      Patient's most recent vital signs are:     Vital signs:  BP: 119/55  Temp: 98  HR: 61  RR: 16  SpO2: 98 %     Patient does not have new respiratory symptoms.  Patient does not have new sore throat.  Patient does not have a fever greater than 99.5.

## 2023-09-02 NOTE — PROGRESS NOTES
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 Was transferred to  TCU where he completed IV abx course, became profoundly deconditioned but refused mobilization, and ultimately underwent reimplant of hip 5/2023. Most recently readmitted to hospital 6/17-8/28/23 w/ encephalopathy and MRSA bacteremia, underwent left hip I&D on 6/21/23. Now back at [facility] for ongoing rehabilitation.     #MRSA Bacteremia, Suspected 2/2 Left Hip PJI and Associated Soft Tissue Infection.   #Left Hip Recurrent PJI s/p DAIR 6/21/23.   #Physical Deconditioning.   Most recently readmitted to hospital w/ MRSA bacteremia (positive blood cultures 6/14-6/17, cleared since 6/19 and KEILA was w/o IE). There was purulent drainage at incision site and CT concerning for infection. Synovial fluid aspiration 6/17 grew MRSA, corynebacterium, klebsiella. The bacteremia was suspected d/t recurrent left hip PJI and associated soft tissue infection (nidus potentially the indwelling drain). Went for debridement and implant retention on 6/21 w/ Dr. Meyers. Intra op cultures w/ MRSA and pseudomonas. ID following. Last CRP flat 4.40 on 8/26. As above, patient has struggled w/ recurrent left hip PJIs since 2018, explanted 11/2022, second stage 5/2023 following aspiration w/ negative cultures prior to hardware reinsertion.   - Went to ID clinic 9/1. Continuing to extend PO Cipro and PO doxycycline (started 8/2) until at least next ID clinic visit 9/20 w/ Dr. Mercer. May need suppressive abx after finishing 12 wk treatment course of DAIR given retained hardware. ID NP Elysia was going to  notify Tracy Meyers and Sylvie of significant pain w/ palpation of left femur.   - Has ortho clinic follow up w/ MIRANDA Elias on 9/25; consider asking ortho if they prefer to see here instead.   - Continue posterior hip precautions x 3 months (~9/21).   - Had GOC discussion w/ patient today (brother present as well) as he continues to be depressed and refuse mobility. Declined psychology, psychiatry, and palliative care consults. Needs LTC placement. Continue attempts at PT/OT for now w/ clear documentation of refusals - states he will refuse today because it won't do anything.     #Left Foot and Ankle Pain.   #Left Unstageable Hospital Acquired Heel Pressure Ulcer, POA.   Reports ongoing left ankle and plantar foot pain since falling during hospitalization w/ unrevealing workup thus far. XR left calcaneous 8/10 showed signs of subtle cortical loss at the lateral aspect of the calcaneus c/f possible acute osteomyelitis but CT left foot 8/13 without signs of fracture or osteomyelitis. XR left ankle 8/29 w/ osteopenia, no fractures, mild degenerative changes.   - Symptomatic management. Patient is currently refusing adjustments to pain regimen which essentially only consists of scheduled Tylenol. He is only using his home Tramadol ~1x daily. Stopped using Voltaren gel. This pain does seem to be one of many barriers to therapy yet he is not interested in trying anything different currently.   - WOCN following heel. Did have floated off bed during visit today.     #Chronic Neck Pain. MRI 8/13 w/ multilevel cervical degenerative changes, greatest at C6-7. Gabapentin was trialed inpatient but stopped d/t somnolence and patient has not been open to retrial at lower dose. Denies any new or worsened neck pain currently.   - Monitor, symptomatic management.     #Adjustment Disorder with Depressed Mood and Passive SI. Has been admitted here or hospital since 11/2022. Is severely depressed w/ frequent passive suicidal statements -  "states he has no plan.   - Continues to refuse psychology/psychiatry involvement.     #HALEY on CKD I-II. Baseline Cr 0.8-1. Peaked at ~6 on 7/25/23. Most recently stable ~2. HALEY was attributed to significant hemodynamic changes including diuresis and and on vancomycin previously. Line was placed inpatient but ultimately did not require dialysis.   - Monitor, avoid nephrotoxins. Was to have OP nephrology follow up - patient not currently interested in clinic visit, can revisit PRN.     #Severe Malnutrition. In acute on chronic illness. Dietitian to follow.     #Chronic Normocytic Anemia. Hgb stable mid 7s.     #HTN. Normotensive. Continue amlodipine and carvedilol.     #History of VTE. On lifelong anticoagulation - continue apixaban.     #JAIR. Continue home CPAP.     CODE: full  DVT: apixaban  Diet: regular  Indications for Psychotropic Medications: N/A  Disposition: PT/OT have certified through 9/28, but patient needs to be consistently working with therapies. Anticipate need for LTC placement. On PO abx at least until 9/20 ID clinic follow up.     Liliya Lai PA-C  Hospitalist Service  Pager: [phone]  ________________________________________________________________    Subjective & Interval History:  Don is frustrated. We talked about events since I last saw him in May before his hip reimplantation surgery. He states no one knows what they are doing and he doesn't see any of his problems getting better. He thinks it might be better if he just \"disappears\" but does not have any plan to harm himself nor is he willing to meet with psychology or psychiatry - does not believe in the \"magic\" of psychotherapy or psych meds. We discussed a more palliative based approach where he does not continue to pursue hospitalizations, antibiotics, surgeries but he is not interested in that either currently. States he met with palliative once in the past but they were not a personality match and he threw them out. Understands " "that he is likely to be in a facility long term. Complains of left leg pain but not so much the hip as the ankle/foot and today the knee. Discussed that he is not using much of his pain meds and could use more if pain is a barrier to therapy - states he does not want to become an \"addict\" and that he is likely to refuse therapy today because \"it won't do anything.\"     Last 24 hour care team notes reviewed.     Physical Exam:    Blood pressure 120/67, pulse 78, temperature 97.5  F (36.4  C), temperature source Axillary, resp. rate 16, SpO2 97 %.    GENERAL: Alert and oriented x 3. Lying in bed, appears comfortable. Conversant. Temporal wasting and appears thinner than when I last saw him in May.   HEENT: Anicteric sclera. Mucous membranes moist.   MUSCULOSKELETAL: No edema, erythema, warmth of left knee or ankle.   EXTREMITIES: No peripheral edema.   SKIN: No jaundice. No rashes.     Lines/Tubes/Drains:   CVC Single Lumen Right Internal jugular Non - valved (open ended);Tunneled;Power injectable (Active)       Medical Decision Makin MINUTES SPENT BY ME on the date of service doing chart review, history, exam, documentation & further activities per the note.                    "

## 2023-09-03 NOTE — PLAN OF CARE
Goal Outcome Evaluation:    Overall Patient Progress: no change    No acute issue during shift. Alert and oriented x4. Able to make needs known. Denied any shortness of breath or chest pain. Right chest CVC intact. Scheduled PO antibiotics given. Lidocaine patch on left leg removed. Mepilex on heel clean and dry. Reminded to use the call light if in need of any assistance. Comfortable in bed during safety rounds. Slept well overnight. Continue with the plan of care.      Patient's most recent vital signs are:     Vital signs:  BP: 123/65  Temp: 97.5  HR: 73  RR: 16  SpO2: 97 %     Patient does not have new respiratory symptoms.  Patient does not have new sore throat.  Patient does not have a fever greater than 99.5.

## 2023-09-03 NOTE — PLAN OF CARE
Patient is alert and oriented . Denied SOB, N/V, and chest pain. Pt refused dinner.  Pt refuses to touch  Left leg heel due to pain. Pre medicated with PRN tramadol before Changing dressing. Applied Triad pates on bilateral buttocks and gluteal cleft wound. Applied mepilex on sacral area. Uses urinal at bed side , emptied urinal x 2 and documented out put. Had loose soft medium BM in this shift. Uses bed pan. CVC single lumen on R internal jugular , patent and heparin locked, and dressing CDI. Pt is co-operative with cares. Call light with in reach. Continue with POC.  Patient's most recent vital signs are:     Vital signs:  BP: 123/65  Temp: 97.5  HR: 73  RR: 16  SpO2: 97 %     Patient does not have new respiratory symptoms.  Patient does not have new sore throat.  Patient does not have a fever greater than 99.5.

## 2023-09-03 NOTE — PLAN OF CARE
Goal Outcome Evaluation:    VS: /69 (BP Location: Left arm)   Pulse 73   Temp 97.7  F (36.5  C) (Oral)   Resp 16   SpO2 96%     O2: RA   Output: Urinal; staff empties  50 mL emesis post AM meds. Pt requested AM meds moved to noon. MAR updated w/meds that are 1x day.    Last BM: 9/2   Activity: Participated in therapy, EOB work, not OOB   LLE WB ok  Sleeping in between cares   Skin: R heel  L heel wound  Sacral/buttocks  Right internal jugular CVC   Pain: Yes. Tramadol x1  Pt refuses touch DT pain; pt does not like to move LLE  Pt has leg  so he can better control movement of leg and staff won't move too quickly and cause pain to pt.    CMS:  Neuro: Pt does not move LLE   A&O   Dressing: R heel CDI  L heel CDI  Sacral/buttocks CDI   Diet: Reg; pt does not like hospital food   LDA: R internal jugular CVC   Equipment: Liko, bedpan, Bariatric bed, recliner   Plan: Con't POC.      Patient's most recent vital signs are:     Vital signs:  BP: 131/69  Temp: 97.7  HR: 73  RR: 16  SpO2: 96 %     Patient does not have new respiratory symptoms.  Patient does not have new sore throat.  Patient does not have a fever greater than 99.5.

## 2023-09-03 NOTE — PROGRESS NOTES
Brief Medicine Note    Note that patient has tunneled internal jugular line that is not being used and is an infection/clot risk. Per d/w charge RN, TCU nursing staff cannot remove so IR consult entered for this week.     Liliya Lai PA-C  Hospitalist Service  Contact information available via [facility] Paging/Directory

## 2023-09-04 NOTE — PLAN OF CARE
Goal Outcome Evaluation:    VS: /65   Pulse 82   Temp 97.8  F (36.6  C) (Axillary)   Resp 16   SpO2 95%      O2: RA   Output: Urinal; staff empties   Last BM: 9/4   Activity: not OOB   LLE WB ok  Sleeping in between cares   Skin: R heel  L heel wound  Sacral/buttocks red and spreading. Area cleaned, Triad paste applied; skin BRII, pt turned on side for about 45 mins.   Right internal jugular CVC   Pain: Pt refuses touch DT pain; pt does not like to move LLE.  Pt has leg  so he can better control movement of leg and staff won't move too quickly and cause pain to pt.   CMS:  Neuro: Pt prefers not to move LLE   A&O   Dressing: R heel CDI  L heel CDI  Sacral/buttocks BRII   Diet: Reg; pt does not like hospital food   LDA: R internal jugular CVC   Equipment: Liko, bedpan, Bariatric bed, recliner   Plan: Con't POC     Patient's most recent vital signs are:     Vital signs:  BP: 132/65  Temp: 97.8  HR: 82  RR: 16  SpO2: 95 %     Patient does not have new respiratory symptoms.  Patient does not have new sore throat.  Patient does not have a fever greater than 99.5.

## 2023-09-04 NOTE — PLAN OF CARE
"Patient is alert and oriented . Able to make needs known to staff. Pt denied chest pain , SOB, and N/V. Pain managed with scheduled tylenol and applied lidocaine patch on L ankle. Incontinent of BM. Uses bedpan for BM, and urinal for voiding. Emptied urinal x 3.   Pt ordered food from out side for dinner . Pt stated that \"its his birthday treat\". Changed dressing on R CVC . Heparin locked and noted good blood return. Pt tolerated well during CVC dressing change. Pt c/o SOB at 2200 hrs , SPO2 is 99 % on room air, elevated head up and repositioned.  Transfers with A2 , liko lift. Call light with in reach. Continue with POC.    Patient's most recent vital signs are:     Vital signs:  BP: 125/70  Temp: 98.6  HR: 70  RR: 16  SpO2: 99 %     Patient does not have new respiratory symptoms.  Patient does not have new sore throat.  Patient does not have a fever greater than 99.5.         "

## 2023-09-05 NOTE — PROCEDURES
Interventional Radiology Brief Post Procedure Note    Procedure: Tunneled CVC removal    Pre-procedure diagnosis: line no longer needed    Post-procedure diagnosis: same    Proceduralist: Keena Lomeli MD    Assistant: None    Time Out: Prior to the start of the procedure and with procedural staff participation, I verbally confirmed the patient s identity using two indicators, relevant allergies, that the procedure was appropriate and matched the consent or emergent situation, and that the correct equipment/implants were available. Immediately prior to starting the procedure I conducted the Time Out with the procedural staff and re-confirmed the patient s name, procedure, and site/side. (The Joint Commission universal protocol was followed.)  Yes        Sedation: None. Local Anesthestic used    Findings: Right internal jugular TUNNELED cvc REMOVED  INTACT    Estimated Blood Loss: Minimal    Fluoroscopy Time:  minute(s)    SPECIMENS: None    Complications: 1. None     Condition: Stable    Plan:   Site cares per orders    Comments: See dictated procedure note for full details.    Keena Lomeli MD

## 2023-09-05 NOTE — PLAN OF CARE
Goal Outcome Evaluation:    No acute issue during shift. Alert and oriented x4. Denied chest pain and shortness of breath. Right chest CVC dressing intact. Mepilex on heel clean and dry. PO antibiotics given. Lidocaine patch on lower back removed. Appears sleeping during rounds. Call light within reach. Continue with the plan of care.    Patient's most recent vital signs are:     Vital signs:  BP: 112/63  Temp: 97.9  HR: 77  RR: 16  SpO2: 96 %     Patient does not have new respiratory symptoms.  Patient does not have new sore throat.  Patient does not have a fever greater than 99.5.

## 2023-09-05 NOTE — PLAN OF CARE
Patient is alert and oriented  x 4. Care anticipated. VSS on RA. Denies any cough, chest pain, SOB, dificulty breathing, lightheadedness or dizziness. No nausea or vomiting. Denies any chills or fever. A2 with likolift. A2 with bed mobility. Continent of bowel & bladder. LBM this shift, uses a bedpan. Able to use urinal. Declined dressing change on left heel.  Order noted for IR CVC removal. Denies pain. Contact isolation maintained. Call light in reach.     Patient's most recent vital signs are:     Vital signs:  BP: 141/68  Temp: 98.5  HR: 99  RR: 16  SpO2: 98 %     Patient does not have new respiratory symptoms.  Patient does not have new sore throat.  Patient does not have a fever greater than 99.5.

## 2023-09-05 NOTE — PROGRESS NOTES
"   09/05/23 1244   Appointment Info   Signing Clinician's Name / Credentials (OT) REINIER Stover   OT Assistant Visit Number 2   OT Goals   Therapy Frequency (OT) 6 times/wk   OT Predicted Duration/Target Date for Goal Attainment 09/27/23   OT: Hygiene/Grooming modified independent;from wheelchair;within precautions   OT: Upper Body Dressing Supervision/stand-by assist;within precautions;from wheelchair   OT: Lower Body Dressing Maximum assist  (pt will participate rolling, lifting LE's, etc...)   OT: Upper Body Bathing Supervision/stand-by assist;within precautions   OT: Bed Mobility Supervision/stand-by assist;within precautions   OT: Transfer Maximum assist;within precautions   OT: Toilet Transfer/Toileting Maximum assist;within precautions   Therapeutic Activities   Therapeutic Activity Minutes (88073) 30   Treatment Detail/Skilled Intervention BRII: Original session rescheduled as pt requesting bed pan upon arrival (despite patient being reminded of appointment an hour prior to session). Pt states, \"I'm depressed and can't do anything but what else is new\" when asked how patient is doing at start of session. Pt agreeable to EOB activity with repeated education on goal of session and carryover into future goals (transfers). Pt requires Total A for management/mobility of LLE w/ very slow, gentle movements. With total A of LLE, patient able to roll slightly onto R side, and push self up into partial sitting position using RUE. Pt remains in this position on EOB with writer providing CGA and cues for transition to functional sitting position w/ feet grounded on floor. Pt tolerates L toes touching floor. Pt continuously leans onto R hip while sitting EOB scratching at sacral wounds, bleeding. Writer tells pt he is bleeding and to stop scratching skin/focus on sitting balance, pt states, \"It feels good I don't even care\". Pt tolerates approx 10 min sitting EOB. Total A for LLE to return to supine, pt manages " trunk/RLE. Pt able to pull self towards HOB using headboard with flat bed. Pt crooked in bed, asked if pt would allow writer to find second person assist to reposition properly. Pt refuses. Despite barriers, overall bed mob and participation slightly improving. Barriers to performance are pt behaviors, pain in L heel/ankle, impaired cognition, weakness, and precautions.   OT Discharge Planning   OT Plan OT: precautions:LLE WBAT, no hip int rot, no hip add past midline, 90deg hip flex. Pain LLE foot, heal, ankle but xray is negative for fracture. Tx: supine to and from sit - total assist to support LLE to keep heal floating and follow hip precautions - progress to pt tolerating L toes on the floor. LIKO lift to and from the recliner to improve sitting kolby and seated exercise. Progress to use of w/c to get to the sink.   OT Brief overview of current status OT: See Ther Act section.   Total Session Time   Timed Code Treatment Minutes 30   Total Session Time (sum of timed and untimed services) 30   Post Acute Settings Only   What unit is patient on? TCU

## 2023-09-05 NOTE — PLAN OF CARE
Goal Outcome Evaluation:      Plan of Care Reviewed With: patient    Overall Patient Progress: no change    Outcome Evaluation: Refuses therapies, repositioning    PT here for deconditioning after complications with L hip explant, reimplant.   Total cares, very unmotivated to participate in therapies, get out of bed, or reposition. When he does get up, uses Leiko lift.   LBM today via bedpan. Uses urinal.   A&O x4, flat affect, lethargic.   R chest CVC heparin locked, to be removed soon. Wound on L heel was due to be changed but pt refused.   Some success with using a leg  so that patient can control how leg is moved.   Reg diet, thin liquids, takes pills whole.  Had PRN tylenol for pain which he states was effective.   On contact precautions for VRE/MRSA.    Patient's most recent vital signs are:      Vital signs:  Temp: 97.9  F (36.6  C) Temp src: Oral BP: 112/63 Pulse: 77   Resp: 16 SpO2: 96 % O2 Device: None (Room air)         Patient does not have new respiratory symptoms.  Patient does not have new sore throat.  Patient does not have a fever greater than 99.5.

## 2023-09-05 NOTE — PLAN OF CARE
Goal Outcome Evaluation:      Plan of Care Reviewed With: patient    Overall Patient Progress: improving    Outcome Evaluation: Pt with continued varied appetite/intakes. Discussed ordering outside food as needed, allowing for double portions as needed to increase intakes.

## 2023-09-05 NOTE — PROGRESS NOTES
CLINICAL NUTRITION SERVICES - ASSESSMENT NOTE     Nutrition Prescription    RECOMMENDATIONS FOR MDs/PROVIDERS TO ORDER:  Encourage oral intakes    Malnutrition Status:    Severe malnutrition in the context of chronic illness    Recommendations already ordered by Registered Dietitian (RD):  Encouraged intakes, provided list of outside food options and double portions allowed at meals.    Future/Additional Recommendations:  Monitor labs, intakes, and weight trends.     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 Was transferred to  TCU where he completed IV abx course, became profoundly deconditioned but refused mobilization, and ultimately underwent reimplant of hip 5/2023. Most recently readmitted to hospital 6/17-8/28/23 w/ encephalopathy and MRSA bacteremia, underwent left hip I&D on 6/21/23. [facility].      FINDINGS  RD met with pt at bedside. Pt reports he has been eating mostly outside food. 1-2 meals daily. Does not enjoy the food here but does have some foods he will eat. Has asked if he can do two Yakut toasts but was denied saying he cannot have double portions. Explained RD can approve pt for double portions if it is able to fit on one tray. Discussed other foods pt enjoys, pt explained he likes [employer] slush floats. Provided pt with list of restaurants in the area including [employer], that pt can order from via door dash. Pt appreciative. Per pt and RN, unable to get pt's weight via bed. Plan to get a new bed/bed part to be able to weigh pt. Encouraged pt to eat at least 2 meals daily.     CURRENT NUTRITION  "ORDERS  Diet: Regular    Intake/Tolerance: 50-75% of documented meals. Poorly documented    Pt has ordered two meals since admission to TCU.     LABS   08/28/23 08:54 08/29/23 06:10 08/31/23 08:40 09/04/23 08:47   Sodium  138 139 139   Potassium 4.5 4.5 4.4 4.2   Urea Nitrogen  31.7 (H) 32.7 (H) 34.7 (H)   Creatinine 2.04 (H) 1.95 (H) 2.01 (H) 2.22 (H)   Glucose  95 103 (H) 102 (H)     MEDICATIONS  Medications reviewed: Cipro, doxycycline hyclate, ferrous sulfate last given 9/01, folic acid, culturell, multivitamin, thiamine, vitamin D3     ANTHROPOMETRICS  Height: 177.8 cm (5' 10\")  Most Recent Weight: 98.7 kg (217 lb 9.5 oz) as of 8/23/23  IBW: 75.5 kg (131% IBW)  BMI: Obesity Grade I BMI 30-34.9  Weight History: Pt with 26 lb (11%) weight loss over 3 months, 107 lb (33%) weight loss over 6 months during hospital/TCU admissions.   08/23/23 1506 98.7 kg (217 lb 9.5 oz) Bed scale   08/14/23 2211 104 kg (229 lb 4.5 oz) Bed scale   08/04/23 0900 134.4 kg (296 lb 4.8 oz) --   07/26/23 1230 134.4 kg (296 lb 6.4 oz) --   07/11/23 1035 132.2 kg (291 lb 6.4 oz) Bed scale   06/28/23 2100 126 kg (277 lb 12.5 oz) Bed scale     06/16/23 115.2 kg (254 lb)   05/26/23 110.4 kg (243 lb 4.8 oz)   05/19/23 110.4 kg (243 lb 4.8 oz)   03/23/23 117 kg (257 lb 14.4 oz)   01/09/23 119.7 kg (264 lb)   11/22/22 147.1 kg (324 lb 4.8 oz)   11/14/22 142.9 kg (315 lb)   11/09/22 146.8 kg (323 lb 9.6 oz)   05/19/22 136.1 kg (300 lb)   07/03/18 148.9 kg (328 lb 4.8 oz)     Dosing Weight: 81 kg - ABW using most recent weight and IBW 75.5 kg    ASSESSED NUTRITION NEEDS  Estimated Energy Needs: 1724-4059 kcals/day (25 - 30+ kcals/kg)  Justification: Maintenance  Estimated Protein Needs:  grams protein/day (1.2 - 1.5+ grams of pro/kg)  Justification: Increased needs for repletion/wound healing  Estimated Fluid Needs: 1 ml/kcal or per provider pending fluid status    PHYSICAL FINDINGS  See malnutrition section below.  Buttocks/gluteal cleft and " heel wounds, see WOC nurse note 8/31    MALNUTRITION  % Intake: </=50% for >/= 1 month (severe)  % Weight Loss: 7.5% in 3 months (moderate) and 10% in 6 months (moderate)   Subcutaneous Fat Loss: Lower Arm moderate and Upper Arm moderate  Muscle Loss: Temporal severe, Thoracic region (clavical, acromium bone, deltoid, trapezius, pectoral) severe, Upper arm severe, and Posterior calf moderate  Fluid Accumulation/Edema: None noted   Malnutrition Diagnosis: Severe malnutrition in the context of chronic illness    NUTRITION DIAGNOSIS  Inadequate oral intake related to poor appetite, pt dislike of hospital food as evidenced by pt report, documented intakes      INTERVENTIONS  Implementation  Nutrition education for nutrition relationship to health/disease     Goals  Patient to consume % of nutritionally adequate meal trays TID, or the equivalent with supplements/snacks.     Monitoring/Evaluation  Progress toward goals will be monitored and evaluated per protocol.    Dee Alanis MS, RDN, LD  RD pager: [phone]  WB Weekend/Holiday Pager: [phone]

## 2023-09-06 NOTE — PROGRESS NOTES
[facility] Transitional Care Unit  [facility] Nurse Inpatient Assessment     Consulted for: Left heel, buttock excoration    Patient History (according to provider note(s):      Per Mariana Sorto NP on 8/28/2023: Waldo Bustos is a 72 year old man with a history of type 2 DM, HLD, chronic back pain, JAIR, DVT/PE on DOAC, prior L BELLA eventually treated with two stage revision w/ explant in 11/2022 and second stage in 5/2023, RA, and venous insufficiency admitted from TCU to [facility] on 6/17 with encephalopathy and MRSA bacteremia.  Underwent I&D with Dr. Meyers on 6/21/23.  Currently on prolonged oral antibiotic course.  He is transferred back to TCU on 8/28 for PT and OT.       Assessment:      Areas visualized during today's visit: Lower extremities     Pressure Injury Location: Left heel            9/6  Last photo: 9/6    Wound type: Pressure Injury     Pressure Injury Stage: Unstageable, hospital acquired, assessed with SANDRA Cook on 7/28  Wound history/plan of care:  Pt known to [facility] service. Pt known to decline cares and repositioning.   7/19 initial assessment: Spent approx 20 mins on negotiating how to move leg to be able to minimally assess wound. Pt agreed to see picture of wound. This writer explained wound to pt using the photo. Discussed with patient if he would like legs elevated on pillows or boots. He agreed to pillows.    7/20: Assessed wound with Kristen Hadley RN CWOCN. Confirmed thick slough/eschar wound base. Again discussed keeping heels off bed with pt.  7/25/2023: Of note, patient has refused and continues to refuse heel off-loading boots on assessment today.  7/28: pt had heel lift boots in place  Wound base: 90 % slough 10% granulation tissue      Palpation of the wound bed: normal      Drainage: scant     Description of drainage: serosanguinous     Measurements (length x width x depth, in cm) 2.8 x 2.6 x 0.3 cm     Tunneling N/A     Undermining N/A  Periwound skin:  Intact      Color: pink      Temperature: normal   Odor: none  Pain: severe, shooting and stabbing  Pain intervention prior to dressing change: slow and gentle cares   Treatment goal: Infection control/prevention and soften nonviable tissue  STATUS: evolving now with slough base   Supplies ordered: discussed with RN and discussed with patient     Wound location: left buttock and gluteal cleft    Last photo: 8/22- new photo did not download 8/31, remains similar to photo above with superficial scratch marks  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)  Wound history/plan of care: pt is incontinent and has previously refused assessment of area by [facility]  Wound base: 100 % epidermis with scratched from fingernails, not open      Palpation of the wound bed: normal      Drainage: none     Description of drainage: none     Measurements (length x width x depth, in cm): see photo      Tunneling: N/A     Undermining: N/A  Periwound skin: Intact and Erythema- blanchable      Color: red      Temperature: normal   Odor: none  Pain: moderate, sharp- mostly related to left hip pain  Pain interventions prior to dressing change: slow and gentle cares   Treatment goal: Maintain (prevention of deterioration) and Protection  STATUS: stable  Supplies ordered: at bedside, discussed with RN, and discussed with patient         Treatment Plan:     Bilateral buttocks and gluteal cleft wounds: BID and PRN with incontinence episodes  Cleanse wound with dry wipes and martell cleanse and protect spray.   Apply light layer of Triad paste (#791586) to wounds and red skin on bilateral buttocks  With repeat application, do not scrub the paste, only remove soiled paste and reapply.  If complete removal of paste is necessary use baby oil/mineral oil (#549574) and soft wash cloth.  Ensure pt has Isaac-cushion (#505136) while sitting up in the chair.  Use only one Covidien pad in between mattress and pt. No brief in bed.    Left heel wound(s): Every  other day   Strongly encourage pt to elevate heels on pillows to float off bed surface at all times. (He agreed to this with WOC)  Cleanse wound with wound cleanser.   Apply light layer of Triad paste (#454971) to wound bed  Secure with mepilex.  If patient agrees to heel off-loading boots, please apply.    Orders: Reviewed and Updated    RECOMMEND PRIMARY TEAM ORDER: None, at this time  Education provided: importance of repositioning, plan of care, wound progress and Infection prevention   Discussed plan of care with: Patient and Nurse  WOC nurse follow-up plan: weekly, stable POC  Notify WOC if wound(s) deteriorate.  Nursing to notify the Provider(s) and re-consult the WOC Nurse if new skin concern.    DATA:     Current support surface: Bariatric Low air loss (BIN pump, Isolibrium, Pulsate, skin guard, etc)  Containment of urine/stool: Incontinence Protocol  BMI: There is no height or weight on file to calculate BMI.   Active diet order: Orders Placed This Encounter      Regular Diet Adult     Output: I/O last 3 completed shifts:  In: 600 [P.O.:600]  Out: 1000 [Urine:1000]     Labs:   Recent Labs   Lab 09/04/23  0847   HGB 7.8*   WBC 8.1       Pressure injury risk assessment:   Sensory Perception: 3-->slightly limited  Moisture: 3-->occasionally moist  Activity: 2-->chairfast  Mobility: 2-->very limited  Nutrition: 2-->probably inadequate  Friction and Shear: 2-->potential problem  Rafael Score: 14    Mona Horton RN  CWOCN  Pager no longer is use, please contact through [employer] group: [facility] Nurse [facility]  Dept. Office Number: [phone]

## 2023-09-06 NOTE — PROGRESS NOTES
Weekly Update     09/06/23 0902   Appointment Info   Signing Clinician's Name / Credentials (OT) FREEMAN Bustos   Self-Care/Home Management   Self-Care/Home Mgmt/ADL, Compensatory, Meal Prep Minutes (56008) 35   Treatment Detail/Skilled Intervention OT: Th took a direct, supportive approach.  Pt moves slow but follows cues.  Th assists pt's with adjusting the bed more flat/lowering to floor so pt can plant his feet.  Th supports LLE during supine to and from sit.  Pt will assist to reposition LLE from his hip with max A.   Therapeutic Procedures/Exercise   Therapeutic Procedure: strength, endurance, ROM, flexibillity minutes (98007) 10   Treatment Detail/Skilled Intervention OT: Pt worked on sitting in midline, tolerated 8 minutes but most of session he was propped on his R elbow in a partial sit.  He c/o dizziness but BP was 121/60 and pt reports feeling better once used to sitting up.  Pt used 6# wted ball for 7 B bicep curls.   OT Discharge Planning   OT Plan OT: precautions:LLE WBAT, no hip int rot, no hip add past midline, 90deg hip flex. Pain LLE foot, heal, ankle but xray is negative for fracture. Tx: supine to and from sit - total assist to support LLE to keep heal floating and follow hip precautions - pt has progressed to tolerating L toes on the floor. LIKO lift to and from the recliner to improve sitting kolby and seated exercise. Progress to use of w/c to get to the sink.   OT Brief overview of current status Pt is progressing with increase ease in supine/sit, it takes slightly less time.  LLE con't to be limited in AROM/strength and th protects L heal due to wound.  Sitting tolerance improved today and pt able to do simple exercise while sitting in midline.  A direct and supportive/encouraging approach has been good for pt participation.  He con't to be negative in his lack of awareness that therapy is paying off and improvement is noted.  Con't with updated POC.   Total Session Time   Timed Code  Treatment Minutes 45   Total Session Time (sum of timed and untimed services) 45   Post Acute Settings Only   What unit is patient on? TCU

## 2023-09-06 NOTE — PLAN OF CARE
Goal Outcome Evaluation:    Pt is alert and oriented x 4 , can make needs known. Denies SOB, CP, and no n/v, continent of B&B , urinal by bed side, Assist of  2 with liko lift, Prn tylenol and robaxin given x 1. Scheduled tylenol hold pt refused because he had just received the PRN. Pt is on regular diet , takes pill whole, no acute issue on this shift, call light within reach, continue with plan of care           Patient's most recent vital signs are:     Vital signs:  BP: 124/61  Temp: 96.7  HR: 78  RR: 18  SpO2: 98 %     Patient does not have new respiratory symptoms.  Patient does not have new sore throat.  Patient does not have a fever greater than 99.5.

## 2023-09-06 NOTE — PLAN OF CARE
Goal Outcome Evaluation:    VS: /67 (BP Location: Right arm)   Pulse 75   Temp 98.3  F (36.8  C) (Axillary)   Resp 16   SpO2 95%    O2: RA   Output: Urinal at bedside   Last BM: 9/5, bedpan   Activity: Participated in therapy  Sleeping in between cares   Skin: X:  Left heel wound  Buttocks/caridad/scrotum reddened; pt refuses powder   Pain: Tramadol x1    Pt refused to have Lidocaine patch removed from LLE. Aarti RN updated.    CMS:  Neuro: Intact   A&O, makes needs known; particular about cares   Dressing: L heel dsg CDI; changed by WOC RN   Diet: Reg  Pt eats 1x/day   LDA:    Equipment: Leg , Liko   Plan: Con't POC.    Additional Info: Th approached RN about pt's buttocks weeping; pt refused repositioning. Pt was on phone so unable to assess skin. Pt has been educated by writer many times about importance of repositioning. Updated aarti RN to try to give skin care to pt.      Patient's most recent vital signs are:     Vital signs:  BP: 123/67  Temp: 98.3  HR: 75  RR: 16  SpO2: 95 %     Patient does not have new respiratory symptoms.  Patient does not have new sore throat.  Patient does not have a fever greater than 99.5.

## 2023-09-06 NOTE — PROGRESS NOTES
Status at Admission - 8/30/23 Current Status - 9/6/23   Bed Mobility Total assist for LLE, use of bed controls and bed rail, increased time pt able to scoot to middle of bed by pull with UE on bed rail. pt needing support LLE and min A to roll. extra care needed for L heel due to pain. Pt needs extra time and bed rail for all position changes, help managing LLE due to painf   Transfer Ceiling lift bed to recliner Ceiling lift bed to recliner. Sit to stand with A x 2 to front WW, pt able to get hips extended with heavy upper extremity reliance on walker, Stands ~7 sec with mostly toe touch weight bearing on left   Gait unable unable   Stairs unable unable   Wheelchair Propulsion unable unable         Assessment of Progress Since Last Update: Patient has demonstrated improved participation with therapy the last few sessions and has managed to perform a sit to stand for the first time in months. He is able to sit EOB unsupported approximately 60 - 90 sec.    Barriers to Progress: Patient has been bed bound for several months and is severely deconditioned. He continues to have  pain in Left LE that requires extra time for all movement.    Proposed Solutions to Improve Barriers: As patient is able to move more it should hopefully lower his pain levels. Medical team continues to address pain   Justification for Continued Therapy Services: Patient requires assist of 2 for all out of bed mobility. He needs continued therapy in order to reduce his burden of care at his next level of care.   Additional Considerations for Safe and Efficient Discharge: Patient will likely need wheel chair, hospital bed and lift depending on progress.

## 2023-09-06 NOTE — PROGRESS NOTES
SW attempted to call brother Zeferino for care conference.  The phone rang and rang and it did NOT go to .  ZEB then emailed Zeferino and nephew Pollo asking for care conference Wednesday thru Friday for an update on pt.     NEREYDA Sharp   [facility], Transitional Care Unit   Social Work   [address] [place] [address], [address] [place] [address]  ) [phone]

## 2023-09-06 NOTE — PROGRESS NOTES
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 Was transferred to  TCU where he completed IV abx course, became profoundly deconditioned but refused mobilization, and ultimately underwent reimplant of hip 5/2023. Most recently readmitted to hospital 6/17-8/28/23 w/ encephalopathy and MRSA bacteremia, underwent left hip I&D on 6/21/23. [facility]      MRSA bacteremia, suspected 2/2 left hip PJI and associated soft tissue infection  Left hip recurrent PJI s/p DAIR (6/21/23)  Physical deconditioning  Most recently readmitted to hospital w/ MRSA bacteremia (positive blood cultures 6/14-6/17, cleared since 6/19 and KEILA was w/o IE). There was purulent drainage at incision site and CT concerning for infection. Synovial fluid aspiration 6/17 grew MRSA, corynebacterium, klebsiella. The bacteremia was suspected d/t recurrent left hip PJI and associated soft tissue infection (nidus potentially the indwelling drain). Went for debridement and implant retention on 6/21 w/ Dr. Meyers. Intra op cultures w/ MRSA and pseudomonas. ID following. Last CRP flat 4.40 on 8/26. As above, patient has struggled w/ recurrent left hip PJIs since 2018, explanted 11/2022, second stage 5/2023 following aspiration w/ negative cultures prior to hardware reinsertion.    - Saw ID on 9/1: extended PO Cipro and doxycycline (started 8/2) until at least next ID clinic visit on 9/20 with Dr. Mercer   - May need suppressive abx after finishing 12 week treatment course of DAIR d/t retained hardware   - Scheduled in Ortho Clinic with PA on 9/25, could see if  inpatient team prefers to see at TCU   - Continue posterior hip precautions x3 months (~9/21)   - Patient continues to be depressed and refuses mobility. Declined Psychology, Psychiatry, Palliative Care consults. Will need LTC. Continue attempts at PT/OT for now w/ clear documentation of refusals.    Left foot and ankle pain  Left unstageable hospital-acquired heel pressure ulcer, POA  Reports ongoing left ankle and plantar foot pain since falling during hospitalization w/ unrevealing workup thus far. XR left calcaneous 8/10 showed signs of subtle cortical loss at the lateral aspect of the calcaneus c/f possible acute osteomyelitis but CT left foot 8/13 without signs of fracture or osteomyelitis. XR left ankle 8/29 w/ osteopenia, no fractures, mild degenerative changes.    - WOCN following heel   - Pain mgmt: Patient is refusing adjustments to pain regimen which essentially only consists of scheduled Tylenol. He is only using his home Tramadol ~1x daily. Stopped using Voltaren gel. This pain does seem to be one of many barriers to therapy yet he is not interested in trying anything different currently.      Chronic neck pain: MRI 8/13 w/ multilevel cervical degenerative changes, greatest at C6-7. Gabapentin was trialed inpatient but stopped d/t somnolence and patient has not been open to retrial at lower dose. Denies any new or worsened neck pain currently.    - Monitor, symptomatic management.      Adjustment disorder with depressed mood and passive SI: Has been admitted here or hospital since 11/2022. Is severely depressed w/ frequent passive suicidal statements - states he has no plan.    - Continues to refuse psychology/psychiatry involvement.      HALEY on CKD I-II: Baseline Cr 0.8-1. Peaked at ~6 on 7/25/23. Most recently stable ~2. HALEY was attributed to significant hemodynamic changes including diuresis and and on vancomycin previously. Line was placed inpatient but ultimately did not require dialysis - IR  "removed tunneled line on 9/5.    - [facility]   - Needs outpatient Nephrology follow-up, patient not currently interested in clinic visit, can revisit PRN       Other Stable Medical Issues:  Severe malnutrition: In acute on chronic illness. RD following.  Chronic normocytic anemia: Hgb stable mid 7s.   Hypertension: Normotensive. Continue amlodipine and carvedilol.   Hx of VTE: On lifelong anticoagulation - continue apixaban.   JAIR: Continue home CPAP.         CODE: FULL  DVT: DOAC  Diet: Regular  Indications for Psychotropic Medications: N/A  Disposition: PT/OT have certified through 9/27, but patient needs to be consistently working with therapies. Anticipate need for LTC placement.     Diana Farias PA-C  Hospitalist Service  Pager: [phone]  ________________________________________________________________    Subjective & Interval History: He offers no complaints, \"I'm fine,\" but when prompted endorses ongoing hip pain. He trialed Robaxin this morning and is not sure if it helped. Encouraged him to utilize Tylenol and Tramadol more. Discussed his concerns about getting \"hooked\" on pain meds.     Last 24 hour care team notes reviewed.   ROS: 4 point ROS (including Respiratory, CV, GI and ) was performed and negative unless otherwise noted in HPI.     Medications: Reviewed in EPIC.    Physical Exam:    Blood pressure 123/67, pulse 75, temperature 98.3  F (36.8  C), temperature source Axillary, resp. rate 16, SpO2 95 %.    Constitutional: Awake and alert, laying in bed with shades drawn at 11:00 am. Flat affect.   Eyes: Sclera clear, anicteric   Respiratory: Breathing non-labored. CTAB  Cardiovascular:  RRR, normal S1/S2. No rubs or murmurs.  GI: Normoactive bowel sounds.   Skin:  Good color. No jaundice. No visible rashes, lesions, or bruising of concern.   Neurologic: Alert and oriented.        Lines/Tubes/Drains:        Wound Heel Pressure injury hospital acquired Unstagable (Active)   Wound Bed Other " (Comment) 09/05/23 1600   Daria-wound Assessment Erythema 09/05/23 1600   Drainage Amount Small 09/05/23 1600   Drainage Color/Characteristics Serosanguineous 09/05/23 1600   Wound Care/Cleansing Other (Comment) 09/05/23 1600   Dressing Other (Comment) 09/05/23 1600   Dressing Status New dressing 09/05/23 1600   Dressing Change Due 09/06/23 09/05/23 1600   Number of days:

## 2023-09-07 NOTE — PROGRESS NOTES
09/07/23 2031   Appointment Info   Signing Clinician's Name / Credentials (PT) Azeb Quinonez DPT   Therapeutic Activity   Therapeutic Activities: dynamic activities to improve functional performance Minutes (21366) 41   Symptoms Noted During/After Treatment Increased pain  (nausea)   Treatment Detail/Skilled Intervention PT: pt supine agreeable to get to recliner and try to stand. A x 2 for ceiling lift, mod A for rolling L and R for sling placement. Pt asks to hook L LE over R for support, move L LE by lifting under calf. Pt transferred to recliner. Discussed plan to perform STS with walker, strategy for supporting L LE, having A x 2. Pt states that standing on Tuesday was painful during the stand and for the rest of the day. PT noted that this is to be expected given that he has not put weight on L LE for a very long time. Pt states he doesn't understand the value in trying to stand. Education on progress and how his partial stand on Tuesday is progress and how he needs to continue to build on progress. Pt agreeable to try and rehab tech is present to assist. Pt then notes he feels very dizzy and is unable to attempt to stand today. Pt agreeable to try WB through L LE while sitting in recliner. Then pt notes he feels nauseated and needs to return to bed. Pt states he has not eaten today and had pain pills 30 min prior to session. Pt has small emesis and after a few minutes is return to bed using ceiling lift A x 2 with all needs met. Hand off given to RN by the aide.   PT Discharge Planning   PT Plan PT: ceiling lift to chair,  progress STS with mod Ax 2 with WW, see tuesday 9/5 flow sheet for set up and details.   PT Discharge Recommendation (DC Rec) Long term care facility   PT Rationale for DC Rec Pt has been bed bound for  ~10 months   PT Brief overview of current status Ax2 for OH lift   Total Session Time   Timed Code Treatment Minutes 41   Total Session Time (sum of timed and untimed services) 41   Post  Acute Settings Only   What unit is patient on? TCU

## 2023-09-07 NOTE — PROGRESS NOTES
ZEB received an email from brother/Ivan.  He will be here Friday at 1 pm.  ZEB said we will be there right after rounds.  Maybe 1:20.     NEREYDA Sharp   [facility], Transitional Care Unit   Social Work   [facility] [address], [address] [place] [address]  () [phone]

## 2023-09-07 NOTE — PLAN OF CARE
Goal Outcome Evaluation:  No acute issues overnight. Appears sleeping well tonight and has no c/o's or requests as of yet. Continent using urinal indep.- staff empties. LBM yesterday using bedpan.    0735 - Pt.refused early AM meds and removal of Lidoderm patch / day RN updated as pt.requested meds no earlier than 0900.         Patient's most recent vital signs are:     Vital signs:  BP: 135/71  Temp: 96  HR: 74  RR: 18  SpO2: 98 %     Patient does not have new respiratory symptoms.  Patient does not have new sore throat.  Patient does not have a fever greater than 99.5.

## 2023-09-07 NOTE — PLAN OF CARE
Goal Outcome Evaluation:         VSS. Alert and orientedx4. Declined repositioning and pericares. Took meds including pain meds. Pain generalized gabino to L heel, tylenol and tramadol given. Participated with therapy and went to recliner but wanted to get back to bed right away d/t feeling dizzy. Encourage fluid and food intake but pt only ate x1/day.  Continues to have excoriation to buttocks- declined miconazole or triad paste. Used urinal and bedpan.   Dressing to L heel- CDI- to be change jg.  Will continue plan of care.  Vital signs:  Temp: 97.1  F (36.2  C) Temp src: Oral BP: 111/64 Pulse: 74   Resp: 16 SpO2: 97 % O2 Device: None (Room air)

## 2023-09-07 NOTE — PLAN OF CARE
Pt is alert and oriented x4. Able to make needs known to staff. Denies SOB,CP and N/V. Uses urinal at bedside. Assist of two with liko lift. On a regular diet. Take medication whole with thin liquid. Continent of both bowel and bladder. Right CVC was removed. Will continue plan of care.            Patient's most recent vital signs are:     Vital signs:  BP: 135/71  Temp: 96  HR: 74  RR: 18  SpO2: 98 %     Patient does not have new respiratory symptoms.  Patient does not have new sore throat.  Patient does not have a fever greater than 99.5.

## 2023-09-08 NOTE — PROGRESS NOTES
SPIRITUAL HEALTH SERVICES  [facility] WB UNIT TCU    REFERRAL SOURCE: -initiated -- length of stay      Initiated visit with Rahat, who was sitting in chair and appeared drowsy. Introduced my role on unit as providing emotional and spiritual support. Rahat declined a visit at this time. I asked if he would prefer I check back in or if he would plan on reaching out if he wanted a visit; he said that he would reach out himself. I oriented Waldo VALENZUELA Jenniferhe to  availability and how to request support during hospital stay.      PLAN: Spiritual Health Services remains available for patient, family, and staff support.       JOEY Parker.   Associate   Pager: [phone]    * [facility] remains available 24/7 for emergent requests/referrals, either by having the switchboard page the on-call  or by entering an ASAP/STAT consult in Epic (this will also page the on-call ). Routine Epic consults receive an initial response within 24 hours.*

## 2023-09-08 NOTE — PLAN OF CARE
Goal Outcome Evaluation:     VSS. Alert and orientedx4. Uncooperative  Tylenol and tramadol given for generalized pain and L heel pain.Went to recliner with therapy for an hour then came back to bed. Declined dizziness, headache, N/V, abdominal discomfort. Scab from picking on his forehead, BRII, declined any dressing.  L heel dressing changed, scant serosang drainage.  With care conference around 1320.  Declined breakfast and lunch. Offered crackers but declined to eat.  Declined miconazole powder and martell and triad paste to excoriated buttocks area.  Declined dressing changed to old CVC line to R chest.   Will continue plan of care.  Vital signs:  Temp: 99  F (37.2  C) Temp src: Oral BP: 108/57 Pulse: 83   Resp: 16 SpO2: 95 % O2 Device: None (Room air)

## 2023-09-08 NOTE — PROGRESS NOTES
"   09/08/23 0980   Appointment Info   Signing Clinician's Name / Credentials (PT) Macy Florence PTA   PT Assistant Visit Number 3   Therapeutic Procedure/Exercise   Ther. Procedure: strength, endurance, ROM, flexibillity Minutes (26608) 26   Symptoms Noted During/After Treatment increased pain   Treatment Detail/Skilled Intervention PT: Performed reaching activities across body w/LLE on 4\" step for improved trunk activation and weight shifting into LEs. Pt able to perform 1 bout x 5 reps ea UE. Attempted second set with Pt reaching ~1\" further but Pt declined stating \" I can't\". Despite max encouragement and education on importance of Pt working on OOB activities and stretching/strengthening, Pt continued to decline. While seated in recliner and 4' step under L foot, educated Pt on use of leg  for posterior chain mm stretching and AAROM activities of L ankle and L knee. min A to place leg  under L foot. Pt reported \"that hurts\" to minimal touch. Pt required max encouragement, significant time to perform cued tasks. Pt able to perform 3 bouts w/leg  and refused assist by Th for manual stretching for contracture prevention and improved mm activation. Education for Pt on contracture prevention and importance of movement and mm activation to reach goals of standing and gait activities.   Therapeutic Activity   Therapeutic Activities: dynamic activities to improve functional performance Minutes (57724) 21   Symptoms Noted During/After Treatment Fatigue   Treatment Detail/Skilled Intervention PT: approached Pt at scheduled PT time, but Pt declined stating \"it is to early\". Offered to stop back in 45 min d/t Th having an opening at that time and Pt agreeable. Upon return at agreed upon time, Pt supine and sleeping in bed. easily roused and agreeable to therapy. Educated Pt on plan to lift Pt to recliner and work on stretching/strengthening of BLE then continue to progress standing from recliner. Pt stated " "\"I think that is to aggressive\". Pt refusing to wear brief or underwear or use marcelo for improved sanitation. min A x 2 and additional time, cues for sequencing w/rolling L and R for placing sling for ceiling lift transfer from bed < recliner. noted Pt had small BM on skin and sheets. max A x 1 for cleaning of skin and placement of clean towel for sanitation  to complete placing of sling and transfer. Placed towel between legs for skin protection and comfort during transfer. cues for crossing BUE for increased safety but Pt insisted on grabbing bar of sling and unwilling to cross arms stating \"I always do this\". Educated Pt that BUE on bar is a safety risk and by declining to adhere to safety cues, puts Pt at risk of injury. Pt did not respond and continued to keep B hands on bar of sling. A to place LLE over RLE then Pt able to use RLE to support LLE through transfer. Pt unable/unwilling to lift LLE w/o max A. Once positioned in recliner, Pt cued for adjusting trunk positioning for improved posture and skin protection. Pt able to follow cue then returned to L lateral lean. Performed reaching activities across body w/LLE on 4\" step for improved trunk activation and weight shifting into LEs. Pt able to perform 1 bout x 5 reps ea UE. Attempted second set with Pt reaching ~1\" further but Pt declined stating \" I can't\". Despite max encouragement and education on importance of Pt working on OOB activities and stretching/strengthening, Pt continued to decline. When cued to perform movement, Pt requires extensive time to complete task. Pt agreeable to remain up in recliner til 11am. Educated Pt for increased OOB activities, offered set schedule as an option. Pt stated \"I am out of bed a lot\", despite staff reporting Pt insisting on staying supine in bed all day/night. inquired if Pt continues to have goals of \"standing and walking\". Pt stated \"Of course!\" but continues to decline participation in activities to reach those " goals.   PT Discharge Planning   PT Plan PT: see if CNA can get Pt up and in recliner before scheduled therapy time. If not, then ceiling lift to chair, progress STS with mod Ax 2 with WW, see tuesday 9/5 flow sheet for set up and details.   Total Session Time   Timed Code Treatment Minutes 47   Total Session Time (sum of timed and untimed services) 47   Post Acute Settings Only   What unit is patient on? TCU

## 2023-09-08 NOTE — PROGRESS NOTES
Care Conference:  PA, Nutrition, RN, OT, SW met with pt, brother, on phone nephew and girlfriend.   Pain was discussed and eating.  Pt needs to stay on top of pain to work through therapies and getting in/out of bed. Pt needs to eat as much as pt can for more protein. Brother buys food. We talked about switching up food. Pt only wants to work with one therapist.  SW said that only hurts pt.  Must work with all of them as that one therapist is not on much. If pt gets in/out of bed, then will eat more. Pt has lost about 100 lbs in last year. Nephew is still working with MA. He is still getting proof and working with MA to get application approved. It's still not approved it.  Insurance is still covering pt a week at a time.  Pollo is working with Whitney at [facility].    Pt and family would [place] pt [place] [facility].  [place] than [place].  When the time comes, will get [place] [employer] and get [place].  Will look for placement as soon as MA is approved.     Danielle Moreira, NEREYDA   [facility], Transitional Care Unit   Social Work   [address] [place] [address]  (ph) [phone]

## 2023-09-08 NOTE — PLAN OF CARE
Goal Outcome Evaluation:  Pt.has been sleeping throughout shift. Continent using urinal indep.- staff emptied. LBM yesterday using bedpan. Has no c/o's or requests as of yet. Has Lidoderm patch (L) lower back - removal in AM ~0900. Expecting brother Ivan today ~1300 and then care conference ~1320 after team rounds.         Patient's most recent vital signs are:     Vital signs:  BP: 137/69  Temp: 97.5  HR: 69  RR: 18  SpO2: 96 %     Patient does not have new respiratory symptoms.  Patient does not have new sore throat.  Patient does not have a fever greater than 99.5.

## 2023-09-08 NOTE — PLAN OF CARE
Goal Outcome Evaluation:       A&O X 4. Denies Cp, SOB, N&V and chill  or fever. Able to make needs known. A2 liko lift. Eats regular diet. Takes med whole with thin liquid. Continent to b&b. Used the urinal and bedpan. Had a bm this evening and took a shower. L heel dsg CDI. Lidocaine patch applied to lower back. Refused miconazole (Micatin) 2% powder this shift. No acute issues this shift. Continue to monitor POC.        Patient's most recent vital signs are:     Vital signs:  BP: 137/69  Temp: 97.5  HR: 69  RR: 18  SpO2: 96 %     Patient does not have new respiratory symptoms.  Patient does not have new sore throat.  Patient does not have a fever greater than 99.5.

## 2023-09-09 NOTE — PROGRESS NOTES
09/08/23 1342   Appointment Info   Signing Clinician's Name / Credentials (OT) FREEMAN Bustos   Rehab Comments (OT) Care Conference with this th representing OT/PT.  Th provided instruction re: pt status and recommendations to improve his performance.   Pt and brother present (additional family on phone).  Th directs instruction toward pt to reinforce what he needs to improve to progress in therapy, eg improve structure withhis day and eat before he takes pain meds to avoid nausea and tolerate therapy with less pain. Brother indicates understanding and is looking for ideas for pt to increase his success with nutrition and therapy. OT will con't with late AM sessions and PT in the afternoon.

## 2023-09-09 NOTE — PLAN OF CARE
Recent vitals: /60 (BP Location: Left arm)   Pulse 77   Temp 97.2  F (36.2  C) (Oral)   Resp 16   SpO2 97%   Pt is A & O x 4. Pt appeared to have slept most of the night. Pt verbalized pain, pain managed with scheduled and PRN medications. No BM this shift. Pt is able to communicate needs. Call button placed within reach. Continue with POC.

## 2023-09-09 NOTE — PLAN OF CARE
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 RN inquired about patient's thoughts around his progress, goals and plan going forward. Mr. Bustos is appreciative of staff but is upset over his situation. An important request made frequently is for staff to be careful when handling his left lower limb as it is severely painful upon palpation. Mr. Bustos also made mention of his appreciation and preference working with male therapist Oskar. He sat up in chair and stood from sitting position today with therapy. RN acknowledge this achievment and encouraged patient going forward. Patient denied pain throughout shift but continues to have excoriated coccyx area. He routinely decliens having interventions done to prevent further worsening progression of his skin integrity.      Problem: Plan of Care - These are the overarching goals to be used throughout the patient stay.    Goal: Plan of Care Review  Description: The Plan of Care Review/Shift note should be completed every shift.  The Outcome Evaluation is a brief statement about your assessment that the patient is improving, declining, or no change.  This information will be displayed automatically on your shift note.  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 RN inquired about patient's thoughts around his progress, goals and plan going forward. Mr. Bustos is appreciative of staff but is upset over his situation. An important request made frequently is for staff to be careful when handling his left lower limb as it is severely painful upon palpation. Mr. Bustos  "also made mention of his appreciation and preference working with male therapist Oskar. He sat up in chair and stood from sitting position today with therapy. RN acknowledge this achievment and encouraged patient going forward. Patient denied pain throughout shift but continues to have excoriated coccyx area. He routinely decliens having interventions done to prevent further worsening progression of his skin integrity.  Plan of Care Reviewed With: patient  Overall Patient Progress: improving  Goal: Patient-Specific Goal (Individualized)  Description: You can add care plan individualizations to a care plan. Examples of Individualization might be:  \"Parent requests to be called daily at 9am for status\", \"I have a hard time hearing out of my right ear\", or \"Do not touch me to wake me up as it startles me\".  Outcome: Progressing  Goal: Absence of Hospital-Acquired Illness or Injury  Outcome: Progressing  Intervention: Prevent Skin Injury  Recent Flowsheet Documentation  Taken 9/8/2023 2212 by Violet Escalante RN  Body Position:   supine   right   turned  Taken 9/8/2023 2100 by Violet Escalante RN  Body Position:   supine   turned   left  Taken 9/8/2023 2000 by Violet Escalante RN  Body Position:   supine   right   turned  Taken 9/8/2023 1900 by Violet Escalante RN  Body Position:   supine   turned   left  Taken 9/8/2023 1800 by Violet Escalante RN  Body Position:   supine   right   turned  Taken 9/8/2023 1700 by Violet Escalante RN  Body Position:   supine   turned   left  Taken 9/8/2023 1630 by Violet Escalante RN  Body Position: supine  Taken 9/8/2023 1600 by Violet Escalante RN  Body Position:   supine   right   turned  Intervention: Prevent Infection  Recent Flowsheet Documentation  Taken 9/8/2023 1700 by Violet Escalante RN  Infection Prevention: equipment surfaces disinfected  Goal: Optimal Comfort and Wellbeing  Outcome: Progressing  Goal: Readiness for Transition of Care  Outcome: Progressing   "   Problem: Individualization  Goal: Patient Preferences  Outcome: Progressing     Problem: TCU Patient Goals  Goal: TCU Plan of Care  Outcome: Progressing     Problem: Goal Outcome Summary  Goal: Goal Outcome Summary  Outcome: Progressing     Problem: Hip Arthroplasty  Goal: Optimal Functional Ability  Description: Implement multidisciplinary rehabilitation following early mobility guidelines; coordinate pain control to optimize comfort with activity.    Identify functional limitations, such as ADL (activities of daily living), mobility safety and independence; encourage optimal participation to minimize decline associated with inactivity.    Facilitate functional mobility, such as bed mobility, transfers and ambulation; progress and retrain as tolerated.    Encourage ADL (activities of daily living), such as self-feeding, hygiene and dressing; provide set-up, adaptations, assistance and extra time as needed.    Promote a safe and accessible environment and effective use of assistive devices and equipment.    Pace and cluster activity to balance with rest periods and conserve energy; promote adequate nutrition, sleep and rest.    Evaluate and address performance deficits, such as cognitive, balance and activity tolerance impairments.    Facilitate range of motion and prescribed exercises, such as isometric gluteal and quadriceps exercise.    Outcome: Progressing  Intervention: Promote Optimal Functional Status  Recent Flowsheet Documentation  Taken 9/8/2023 1700 by Violet Escalante, RN  Activity Management:   activity adjusted per tolerance   bedrest     Problem: Diabetes Comorbidity  Goal: Blood Glucose Level Within Targeted Range  Description: Establish target blood glucose levels based on patient-specific factors, such as age, diabetes-related complications and illness severity.    Document blood glucose levels and monitor trend; advocate for adjustment to keep within targeted range.    Provide pharmacologic  therapy to maintain blood glucose levels within targeted range.    Check blood glucose level if there is a change in mental or cognitive status.    Outcome: Progressing     Problem: Pain Chronic (Persistent) (Comorbidity Management)  Goal: Acceptable Pain Control and Functional Ability  Description: Evaluate pain level, effect of treatment and patient response at regular intervals.    Minimize pain stimuli; coordinate care and adjust environment (e.g., light, noise, unnecessary movement); promote sleep/rest.    Match pharmacologic analgesia to severity and type of pain mechanism (e.g., neuropathic, muscle, inflammatory); consider multimodal approach (e.g., nonopioid, opioid, adjuvant).    Provide medication at regular intervals; titrate to patient response.    Manage breakthrough pain with additional doses; consider rotation or switching medication    Outcome: Progressing  Intervention: Manage Persistent Pain  Recent Flowsheet Documentation  Taken 9/8/2023 1700 by Violet Escalante RN  Complementary Therapy: (relaxing/destimulating environment provided) other (see comments)     Problem: Obstructive Sleep Apnea Risk or Actual  Goal: Unobstructed Breathing During Sleep  Description: Use a validated screening tool to identify risk for JAIR (obstructive sleep apnea).    Implement continuous pulse oximetry to detect apnea-related oxygen desaturation events.    Encourage a nonsupine sleep position to prevent airway collapse, such as side-lying or head of bed elevated.    Minimize use of sedative, opioid and benzodiazepine medication that may contribute to respiratory depression.    Provide oxygen therapy during sleep to reduce hypoxemia.    Continue use of home prescribed continuous PAP (positive airway pressure) during sleep; utilize home device and settings if available.    Implement continuous PAP during sleep for high-risk of JAIR; consider auto-titrating PAP, humidification and mask choice (including fit and style) to  improve comfort and tolerance.    Provide behavioral and supportive interventions individualized to the patient needs and preferences to improve PAP adherence.    Facilitate polysomnography (sleep study) referral if needed.  Outcome: Progressing     Problem: Urinary Incontinence  Goal: Effective Urinary Elimination  Description: Identify cause and contributing factors, such as disease process, medication or treatment side effects, infection or dietary bladder irritants; provide treatment.    Assess for general symptoms, such as fatigue, depression, weakness, confusion, sensory alterations that could impact toileting.    Provide safe and ready access to toileting devices and aids (e.g., commode, urinal).    Promote behavioral methods, such as prompted toileting, elimination schedule, relaxation techniques or voiding posture to facilitate flow.    Consider pelvic floor muscle strengthening, such as pelvic floor muscle training, vaginal cone, bladder support or neuromodulation.    Ensure bladder emptying (e.g., intermittent catheterization, portable bladder ultrasound after voiding).    Outcome: Progressing     Problem: Bowel Elimination Management  Goal: Effective Bowel Elimination/Continence  Description: Develop and implement a bowel program individualized to type of bowel dysfunction, patient needs and abilities.     Incorporate positioning, natural reflexes or use of physical interventions, such as abdominal massage, digital stimulation, anal irrigation    Encourage regular timing and schedule for elimination.    Encourage fluid intake and intake of dietary fiber, such as whole grains, fruits and vegetables.    Facilitate management of hygiene and cleaning following evacuation and episodes of bowel incontinence.      Outcome: Progressing     Problem: Infection  Goal: Absence of Infection Signs and Symptoms  Description: Implement transmission-based precautions and isolation, as indicated, to prevent spread of  infection.    Obtain cultures prior to initiating antimicrobial therapy, when possible. Do not delay treatment for laboratory results in the presence of high suspicion or clinical indicators.    Administer ordered antimicrobial therapy promptly; reassess need regularly.    Monitor laboratory value, diagnostic test and clinical status trends for signs of infection progression.    Identify early signs of sepsis, such as increased heart rate and decreased blood pressure, as well as changes in mental state, respiratory pattern or peripheral perfusion.      Outcome: Progressing  Intervention: Prevent or Manage Infection  Recent Flowsheet Documentation  Taken 9/8/2023 1700 by Violet Escalante RN  Isolation Precautions: contact precautions maintained     Problem: Skin Injury Risk Increased  Goal: Skin Health and Integrity  Description: Perform a full pressure injury risk assessment, as indicated by screening, upon admission to care unit.    Reassess skin (full inspection and injury risk, including skin temperature, consistency and color) frequently (e.g., scheduled interval, with change in condition) to provide optimal early detection and prevention.    Maintain adequate tissue perfusion (e.g., encourage fluid balance; avoid crossing legs, constrictive clothing or devices) to promote tissue oxygenation.    Maintain head of bed at lowest degree of elevation tolerated, considering medical condition and other restrictions. Use positioning supports to prevent sliding and friction. Consider low friction textiles.    Avoid positioning onto an area that remains reddened or on bony prominences.      Outcome: Progressing  Intervention: Optimize Skin Protection  Recent Flowsheet Documentation  Taken 9/8/2023 2212 by Violet Escalante, RN  Head of Bed (HOB) Positioning: HOB at 15 degrees  Taken 9/8/2023 2100 by Violet Escalante RN  Head of Bed (HOB) Positioning: HOB at 15 degrees  Taken 9/8/2023 2000 by Violet Escalante, MARIBEL  Head of  Bed (HOB) Positioning: HOB at 15 degrees  Taken 9/8/2023 1900 by Violet Escalante RN  Head of Bed (HOB) Positioning: HOB at 15 degrees  Taken 9/8/2023 1800 by Violet Escalante RN  Head of Bed (HOB) Positioning: HOB at 15 degrees  Taken 9/8/2023 1700 by Violet Escalante RN  Activity Management:   activity adjusted per tolerance   bedrest  Head of Bed (HOB) Positioning: HOB at 15 degrees  Taken 9/8/2023 1630 by Violet Escalante RN  Head of Bed (HOB) Positioning: HOB at 20-30 degrees  Taken 9/8/2023 1600 by Violet Escalante RN  Head of Bed (HOB) Positioning: HOB at 15 degrees     Problem: Depressive Signs/Symptoms  Goal: Improved Mood Symptoms (Depressive Signs/Symptoms)  Description: Provide opportunity for patient and family/caregiver to express feelings, stressors and self-perception (e.g., verbalization, journaling).    Convey caring approach, empathy and potential for change; hope is key for recovery.    Utilize existing coping strategies and assist in developing new strategies, such as relaxation, music and problem-solving; assess for ineffective strategies (e.g., substance use, isolation).    Recognize past and present achievements, successes and personal strengths.    Empower participation in planning care and activities, as well as development of attainable goals, to increase self-esteem and feelings of control.    Promote self-care; support balanced sleep, physical activity and nutrition    Outcome: Progressing

## 2023-09-09 NOTE — PROGRESS NOTES
09/09/23 1049   Appointment Info   Signing Clinician's Name / Credentials (OT) DASHA Burgess/L,CBIS   Rehab Comments (OT) OT; weekly update and treatment doc   OT Goals   Therapy Frequency (OT) 6 times/wk   OT Predicted Duration/Target Date for Goal Attainment 09/27/23   OT: Hygiene/Grooming supervision/stand-by assist;Goal Met   OT: Upper Body Dressing Supervision/stand-by assist  (gown)   OT: Lower Body Dressing   (NA, discontinue goal, pt declines wearing street clothing)   OT: Upper Body Bathing Supervision/stand-by assist;within precautions   OT: Bed Mobility Goal Met   OT: Transfer Maximum assist;within precautions;with assistive device  (SPT)   OT: Toilet Transfer/Toileting Maximum assist;within precautions;using adaptive equipment   Self-Care/Home Management   Self-Care/Home Mgmt/ADL, Compensatory, Meal Prep Minutes ([id number]) 30   Treatment Detail/Skilled Intervention OT:agreeable to bed mob for washing up./pericares, adls due to urine incontinence and sheets saturated in urine, pt cooperative w/ all requests and th had pt direct cares, pt required min A and bed rail to roll toward each direction w/ flat bed, pt washed front pericares w/ setup  only and th completed rear pericares completely, , donned clean gown w// min A, washed up w setup, , th washed pt's entire back/back side total assist ,used mech lift w/ assist of 2 from bed to recliner, completed oral cares and hair combing setup from recliner, used urinal indep after setup from recliner,used urinal indep w/ setup from recliner   Therapeutic Procedures/Exercise   Therapeutic Procedure: strength, endurance, ROM, flexibillity minutes (98898) 17   Treatment Detail/Skilled Intervention OT: 3#dowel exer daniella UE sested in recliner upright posture , 10 reps each exer sh mm's and 25 rep elb mm while sitting in recliner bedside   OT Discharge Planning   OT Plan OT: precautions:LLE WBAT, no hip int rot, no hip add past midline, 90deg hip flex. Pain LLE  foot, heal, ankle but xray is negative for fracture.  Keep heel floating and help move LLE with pillow. Tx: supine to and from sit - total assist to support LLE to keep heal floating and follow hip precautions - pt has progressed to tolerating L foot on the floor. LIKO lift to and from the recliner to improve sitting kolby and seated exercise. Progress to use of w/c to get to the sink.   OT Discharge Recommendation (DC Rec) Long term care facility  (antic need for supportive environemnt that provides 24/7 assist due to limited mobility per previous tcu admit and per chart review of recent hospitalization)   OT Brief overview of current status OT: pt agreeable to all necessary cares and partic in all therapy requiest including adls and daniella ue strengthening exer, and agreed to sit in bedside recliner chair, howerer at end of session pt indicated he thought he needed bedpan , th provided detailsed handoff to RN and nsg assistant, overall pt demo less assist when cooperative and engaged in theraputic activites, pt has potential for continued progress w/ mob and adls if consistently engages in therapy and compliant w/ nursing needs/requests.   Total Session Time   Timed Code Treatment Minutes 47   Total Session Time (sum of timed and untimed services) 47   Post Acute Settings Only   What unit is patient on? TCU

## 2023-09-09 NOTE — PROGRESS NOTES
09/09/23 1049   Appointment Info   Signing Clinician's Name / Credentials (OT) Aga Reddy,OTR/L,CBJUANCARLOS   Self-Care/Home Management   Self-Care/Home Mgmt/ADL, Compensatory, Meal Prep Minutes ([id number]) 30   Treatment Detail/Skilled Intervention OT:agreeable to bed mob for washing up./pericares, adls due to urine incontinence and sheets saturated in urine, pt cooperative w/ all requests and th had pt direct cares, pt required min A and bed rail to roll toward each direction w/ flat bed, pt washed front pericares w/ setup  only and th completed rear pericares completely, , donned clean gown w// min A, washed up w setup, , th washed pt's entire back/back side total assist ,used mech lift w/ assist of 2 from bed to recliner, completed oral cares and hair combing setup from recliner, used urinal indep after setup from recliner,   Therapeutic Procedures/Exercise   Therapeutic Procedure: strength, endurance, ROM, flexibillity minutes (87364) 15   Treatment Detail/Skilled Intervention OT: 3#dowel exer daniella UE sested in recliner upright posture , 10 reps each exer sh mm's and 25 rep elb mm while sitting in recliner bedside   OT Discharge Planning   OT Plan OT: precautions:LLE WBAT, no hip int rot, no hip add past midline, 90deg hip flex. Pain LLE foot, heal, ankle but xray is negative for fracture.  Keep heel floating and help move LLE with pillow. Tx: supine to and from sit - total assist to support LLE to keep heal floating and follow hip precautions - pt has progressed to tolerating L foot on the floor. LIKO lift to and from the recliner to improve sitting kolby and seated exercise. Progress to use of w/c to get to the sink.   OT Discharge Recommendation (DC Rec) Long term care facility  (antic need for supportive environemnt that provides 24/7 assist due to limited mobility per previous tcu admit and per chart review of recent hospitalization)   OT Brief overview of current status OT: pt agreeable to akll necessary cares  and partic in all therapy requiest including adls and daniella ue strengthening exer, and agreed to sit in bedside recliner chair   Total Session Time   Timed Code Treatment Minutes 45   Total Session Time (sum of timed and untimed services) 45   Post Acute Settings Only   What unit is patient on? TCU

## 2023-09-09 NOTE — PROGRESS NOTES
09/09/23 1049   Appointment Info   Signing Clinician's Name / Credentials (OT) Aga Reddy,OTR/L,CBIS   Self-Care/Home Management   Self-Care/Home Mgmt/ADL, Compensatory, Meal Prep Minutes ([id number]) 30   Treatment Detail/Skilled Intervention OT:agreeable to bed mob for washing up./pericares, adls due to urine incontinence and sheets saturated in urine, pt cooperative w/ all requests and th had pt direct cares, pt required min A and bed rail to roll toward each direction w/ flat bed, pt washed front pericares w/ setup  only and th completed rear pericares completely, , donned clean gown w// min A, washed up w setup, , th washed pt's entire back/back side total assist ,used mech lift w/ assist of 2 from bed to recliner, completed oral cares and hair combing setup from recliner, used urinal indep after setup from recliner,used urinal indep w/ setup from recliner   Therapeutic Procedures/Exercise   Therapeutic Procedure: strength, endurance, ROM, flexibillity minutes (98997) 17   Treatment Detail/Skilled Intervention OT: 3#dowel exer daniella UE sested in recliner upright posture , 10 reps each exer sh mm's and 25 rep elb mm while sitting in recliner bedside   OT Discharge Planning   OT Plan OT: precautions:LLE WBAT, no hip int rot, no hip add past midline, 90deg hip flex. Pain LLE foot, heal, ankle but xray is negative for fracture.  Keep heel floating and help move LLE with pillow. Tx: supine to and from sit - total assist to support LLE to keep heal floating and follow hip precautions - pt has progressed to tolerating L foot on the floor. LIKO lift to and from the recliner to improve sitting kolby and seated exercise. Progress to use of w/c to get to the sink.   OT Discharge Recommendation (DC Rec) Long term care facility  (antic need for supportive environemnt that provides 24/7 assist due to limited mobility per previous tcu admit and per chart review of recent hospitalization)   OT Brief overview of current status  OT: pt agreeable to all necessary cares and partic in all therapy requiest including adls and daniella ue strengthening exer, and agreed to sit in bedside recliner chair, howerer at end of session pt indicated he thought he needed bedpan , th provided detailsed handoff to RN and nsg assistant   Total Session Time   Timed Code Treatment Minutes 47   Total Session Time (sum of timed and untimed services) 47   Post Acute Settings Only   What unit is patient on? TCU

## 2023-09-09 NOTE — PLAN OF CARE
Goal Outcome Evaluation:      VSS. Alert and orientedx4. Sleepy per pt he did not able to sleep well at night. Tramadol and tylenol given for generalized pain gabino to L heel. L heel dressing CDI- to be change jg. Declined turn and repo. Participated with therapy, went to recliner and stayed for about 20 mins only. Dressing to R chest CDI- from old CVC line- declined to be change.  Will continue plan of care. Pt used bedpan with A-1 person for BM. Independent with using urinal- staff emptied. Mixed continent/incontinent of B/B.  A-2/3 person lift for transfer.  Did not eat breakfast and lunch despite encouragement. Does not like crackers either.   Vital signs:  Temp: 97.2  F (36.2  C) Temp src: Axillary BP: 130/71 Pulse: 74   Resp: 17 SpO2: 98 % O2 Device: None (Room air)

## 2023-09-09 NOTE — PROGRESS NOTES
"   09/09/23 1316   Appointment Info   Signing Clinician's Name / Credentials (PT) Macy Florence PTA   Appointment Canceled Reason (PT) Patient declined   Appointment Cancel Comments (PT) PT: -45 min d/t Pt refusal. OT reported Pt feeling well and good participation. Upon arrival, Pt sleeping and when roused stated \"I forgot you were coming. I feel like crap. Didn't sleep last night, threw up this morning.\" Offered working towards Pt stated goals of standing. Pt stated \"what do you propose, I stand on my head?\". Assured Pt that we would proceed just has Pt had with other Clem Shields. Pt further declined. Will attempt to reapproach later today or tomorrow if schedule allows.       "

## 2023-09-10 NOTE — PLAN OF CARE
Goal Outcome Evaluation:       A&O x4. Denies CP, SOB, N&V and chill or fever. Uses urinal and bedpan at the bedside.No BM this shift. Continent/incontinent to B&B. A2 with liko lift. Applied lidocaine patch. Declined central line change. Able to make needs known. No acute issues this shift. Call light within reach. Continue to monitor POC.      /69 (BP Location: Left arm)   Pulse 77   Temp 98.2  F (36.8  C)   Resp 17   Wt 96.4 kg (212 lb 8.4 oz)   SpO2 94%   BMI 30.49 kg/m

## 2023-09-10 NOTE — PROGRESS NOTES
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 Was transferred to  TCU where he completed IV abx course, became profoundly deconditioned but refused mobilization, and ultimately underwent reimplant of hip 5/2023. Most recently readmitted to hospital 6/17-8/28/23 w/ encephalopathy and MRSA bacteremia, underwent left hip I&D on 6/21/23. [facility].       MRSA bacteremia, suspected 2/2 left hip PJI and associated soft tissue infection  Left hip recurrent PJI s/p DAIR (6/21/23)  Physical deconditioning  Most recently readmitted to hospital w/ MRSA bacteremia (positive blood cultures 6/14-6/17, cleared since 6/19 and KEILA was w/o IE). There was purulent drainage at incision site and CT concerning for infection. Synovial fluid aspiration 6/17 grew MRSA, corynebacterium, klebsiella. The bacteremia was suspected d/t recurrent left hip PJI and associated soft tissue infection (nidus potentially the indwelling drain). Went for debridement and implant retention on 6/21 w/ Dr. Meyers. Intra op cultures w/ MRSA and pseudomonas. ID following. Last CRP flat 4.40 on 8/26. As above, patient has struggled w/ recurrent left hip PJIs since 2018, explanted 11/2022, second stage 5/2023 following aspiration w/ negative cultures prior to hardware reinsertion.    - Saw ID on 9/1: extended PO Cipro and doxycycline (started 8/2) until at least next ID clinic visit on 9/20 with Dr. Mercer   - May need suppressive abx after finishing 12 week treatment course of DAIR d/t retained hardware   - Scheduled in Ortho Clinic with PA on 9/25, could see  if inpatient team prefers to see at TCU   - Continue posterior hip precautions x3 months (~9/21)   - Patient continues to be depressed and refuses mobility. Declined Psychology, Psychiatry, Palliative Care consults. Will need LTC. Continue attempts at PT/OT for now w/ clear documentation of refusals.     Left foot and ankle pain  Left unstageable hospital-acquired heel pressure ulcer, POA  Reports ongoing left ankle and plantar foot pain since falling during hospitalization w/ unrevealing workup thus far. XR left calcaneous 8/10 showed signs of subtle cortical loss at the lateral aspect of the calcaneus c/f possible acute osteomyelitis but CT left foot 8/13 without signs of fracture or osteomyelitis. XR left ankle 8/29 w/ osteopenia, no fractures, mild degenerative changes.    - WOCN following heel   - Pain mgmt: Patient resistant to pain regimen despite ongoing complaints of pain that limits his ability to participate in therapy, not willing to try different medications or adjust current regimen. Now starting to use Tylenol and Tramadol a bit more consistently this week. Continue Tylenol TID + PRN, PRN Tramadol, PRN Robaxin.     Chronic neck pain: MRI 8/13 w/ multilevel cervical degenerative changes, greatest at C6-7. Gabapentin was trialed inpatient but stopped d/t somnolence and patient has not been open to retrial at lower dose. Denies any new or worsened neck pain currently.    - Monitor, symptomatic management.      Adjustment disorder with depressed mood and passive SI: Has been admitted here or hospital since 11/2022. Is severely depressed w/ frequent passive suicidal statements - states he has no plan.    - Continues to refuse psychology/psychiatry involvement.      HALEY on CKD I-II: Baseline Cr 0.8-1. Peaked at ~6 on 7/25/23. Most recently stable ~2. HALEY was attributed to significant hemodynamic changes including diuresis and and on vancomycin previously. Line was placed inpatient but ultimately did not  require dialysis - IR removed tunneled line on 9/5.    - BMP qMTh   - Needs outpatient Nephrology follow-up, patient not currently interested in clinic visit, can revisit PRN        Other Stable Medical Issues:  Severe malnutrition: In acute on chronic illness. RD following.  Chronic normocytic anemia: Hgb stable mid 7s.   Hypertension: Normotensive. Continue amlodipine and carvedilol.   Hx of VTE: On lifelong anticoagulation - continue apixaban.   JAIR: Continue home CPAP.     CODE: FULL  DVT: DOAC  Diet: Regular  Indications for Psychotropic Medications: N/A  Disposition: PT/OT have certified through 9/27, but patient needs to be consistently working with therapies. Anticipate need for LTC placement.     Diana Farias PA-C  Hospitalist Service  Pager: [phone]  ________________________________________________________________    Subjective & Interval History:  Chief complaint of heel pain. Is frustrated that heel pain is not getting better. Discussed importance of mobility and proper nutrition for pressure injury healing. He continues to be withdrawn, laying in bed.  He has been using Tylenol and Tramadol a little more consistently this week after some encouragement.     Last 24 hour care team notes reviewed.   ROS: 4 point ROS (including Respiratory, CV, GI and ) was performed and negative unless otherwise noted in HPI.     Medications: Reviewed in EPIC.    Physical Exam:    Blood pressure 128/79, pulse 81, temperature 97.3  F (36.3  C), temperature source Oral, resp. rate 18, weight 96.4 kg (212 lb 8.4 oz), SpO2 98 %.    Constitutional: Awake and alert, in no apparent distress. Laying in bed in dark room with shades drawn at 1030 am.   Eyes: Sclera clear, anicteric   Respiratory: Breathing non-labored. CTAB. Good air entry.   Cardiovascular:  RRR, normal S1/S2. No rubs or murmurs.   GI: Soft, non-tender, non-distended. Normoactive bowel sounds.   Skin:  Good color. No jaundice. No visible rashes,  lesions, or bruising of concern.   Neurologic: Alert and oriented x3. No focal deficits.         Lines/Tubes/Drains:        Wound Heel Pressure injury hospital acquired Unstagable (Active)   Wound Bed Red;Pink;Moist 09/10/23 1044   Daria-wound Assessment Erythema 09/10/23 1044   Drainage Amount Scant 09/10/23 1044   Drainage Color/Characteristics Brown 09/10/23 1044   Wound Care/Cleansing Wound cleanser;Other (Comment) 09/10/23 1044   Dressing Foam 09/10/23 1044   Dressing Status Changed 09/10/23 1044   Dressing Change Due 09/12/23 09/10/23 1044   Number of days:

## 2023-09-10 NOTE — PLAN OF CARE
Goal Outcome Evaluation:       VSS. Alert and orientedx4. Uncooperative. Brother came and visited, watching tv. Declined to be turned and repo. Declined skin check and to see if pt still has lidocaine patch but seen patch on top of the table. Able to managed wound care and dressing changed to L heel. Tramadol and scheduled tylenol given to managed pain.   Ate 1 burger and soda brought by his brother.  Will continue plan of care.  Vital signs:  Temp: 97.3  F (36.3  C) Temp src: Oral BP: 126/59 Pulse: 81   Resp: 18 SpO2: 98 % O2 Device: None (Room air)

## 2023-09-10 NOTE — PLAN OF CARE
Goal Outcome Evaluation:    Pt is alert and oriented x 4 can make needs known. Denies SOB, CP and no n/v. Pt pain controled by PRN tramadol given x 1. Continent B&B , urinal by bed side. Pt is on regular diet, takes pill whole with thin liquid. No acute issue on this shift, call light within reach. Continue with plan of care.        Patient's most recent vital signs are:     Vital signs:  BP: 120/69  Temp: 98.2  HR: 77  RR: 17  SpO2: 94 %     Patient does not have new respiratory symptoms.  Patient does not have new sore throat.  Patient does not have a fever greater than 99.5.

## 2023-09-11 NOTE — PROGRESS NOTES
Status at Admission - 8/30/23 Status - 9/6/23 Current Status 9/11/23   Bed Mobility Total assist for LLE, use of bed controls and bed rail, increased time pt able to scoot to middle of bed by pull with UE on bed rail. pt needing support LLE and min A to roll. extra care needed for L heel due to pain. Pt needs extra time and bed rail for all position changes, help managing LLE due to painf unchanged   Transfer Ceiling lift bed to recliner Ceiling lift bed to recliner. Sit to stand with A x 2 to front WW, pt able to get hips extended with heavy upper extremity reliance on walker, Stands ~7 sec with mostly toe touch weight bearing on left Pt has not attempted to stand again, he cites pain, nausea and or dizziness during some sessions. He refused PT session on 9/9/23   Gait unable unable unable   Stairs unable unable unable   Wheelchair Propulsion unable unable unable         Assessment of Progress Since Last Update: On 9/5/23 pt performed a partial sit to stand for the first time in months. He maintained toe touch weight bearing on left, citing pain, although he is cleared for full weight bearing. Since that date he has refused to attempt to stand again. Despite education from therapists about pain to be expected with movement and movement is needed to improve he continues to refuse to stand. He is able to sit EOB unsupported approximately 60 - 90 sec.    Barriers to Progress: Patient has been bed bound for several months and is severely deconditioned. He continues to have  pain in Left LE that requires extra time for all movement. Pt refuses interventions that will progress him.   Proposed Solutions to Improve Barriers: If patient is willing to move more it should hopefully lower his pain levels. Medical team continues to address pain   Justification for Continued Therapy Services: Patient requires assist of 2 for all out of bed mobility. He needs continued therapy in order to reduce his burden of care at his next  level of care.   Additional Considerations for Safe and Efficient Discharge: Patient will likely need wheel chair, hospital bed and lift depending on progress.

## 2023-09-11 NOTE — PLAN OF CARE
"Goal Outcome Evaluation      Pt is alert and oriented x 4 , can make needs know, denies SOB,CP and no n/v. Takes regular diet , pills whole. Declined reposition, urinal by bed side emptied. Pt was incontinent of urine and stool, offered clothing change and linen change pt responded \"not right now,\" was approached twice and refused, to approache before the end of the shift.   No acute issue, pt comfortable in bed during rounds. Call light within reach, continue with plan of care.        Patient's most recent vital signs are:     Vital signs:  BP: 119/67  Temp: 97.8  HR: 75  RR: 16  SpO2: 95 %     Patient does not have new respiratory symptoms.  Patient does not have new sore throat.  Patient does not have a fever greater than 99.5.        "

## 2023-09-11 NOTE — PROGRESS NOTES
"   09/11/23 0960   Appointment Info   Signing Clinician's Name / Credentials (OT) REINIER Stover   Appointment Canceled Reason (OT) Patient declined   Appointment Cancel Comments (OT) BRII: Arrived to pt room at 9:00AM for OT session which was originally scheduled at 10:45AM. Due to schedule conflict, writer had to reschedule appointment in order to fit all other patients into schedule. Pt sleeping upon arrival. Pt agreeable to participation in OOB activity. Writer begins to prepare for liko lift patient when patient asks writer what time it is. Writer says \"9AM\". Pt asks \"What time were you supposed to come?\" Writer explains to pt reason for schedule change, pt states \"I wish you wouldn't have done that\". While patient begins to roll to R side w/ NA present, pt then requests bed pan. Pt on bedpan for remainder of session time.       " CHW spoke with patient via phone, patient stated that they have court today at 10AM. Patient wanted to keep CHW informed. CHW connected with SABRINA Hudson to get updated on patient's status while on the unit and placement status. CHW encouraged patient to reach out if they need anything.    Jaya Winter, ÁNGELAW  Community Health Worker, CHW  [facility]

## 2023-09-11 NOTE — PLAN OF CARE
Goal Outcome Evaluation:       A&O X 4. Denies CP, SOB, N&V, and chill or fever. Eats  regular diet. Continent to B&B. Urinal at the bed side. Decline skin check. Application of Lidocaine patch and miconazole 2% powder. Said he was feeling better and did not need the patch.  Able to make needs known. Spent the day watching the [employer]. Call light within reach. No acute issues this shift.Continue to monitor POC.             Patient's most recent vital signs are:     Vital signs:  BP: 119/67  Temp: 97.8  HR: 75  RR: 16  SpO2: 95 %     Patient does not have new respiratory symptoms.  Patient does not have new sore throat.  Patient does not have a fever greater than 99.5.

## 2023-09-11 NOTE — PLAN OF CARE
Patient is alert and oriented x4. Able to make needs known to staff. Patient is incontinent of both bowel and bladder. Transfers assist-2 w/ liko lift. Patient denied Chest pain, SOB, new onset cough and N&V. Diet: Regular diet with poor appetite. Declined PRN pain meds. Call-light within reach. Continue with POC.    Vital signs: T: 97.3,  B/P: 114/61,  P: 75,  R: 18,  SpO2: 95 %     Patient does not have new respiratory symptoms.  Patient does not have new sore throat.  Patient does not have a fever greater than 99.5.

## 2023-09-11 NOTE — PROGRESS NOTES
TCU Notice of Medicare Non-Coverage Note:     Met with patient/family to Introduce self and role.     Discussed Notice of Medicare Non-Coverage and last day of coverage as required for all patients with Medicare coverage during Skilled Nursing Facility (SNF) stays.Informed patient/family that Medicare covers stay until midnight of day before discharge. TCU does not bill for discharge date.    Last coverage day is 9/13/23. Discharge date expected TBD.    Opportunity provided for questions and education provided regarding potential financial impact to patient.     Patient/family verbalize(s) understanding of discussion. Pt refused to sign NOMNC.  SW signed it as instructed and sent it off to Kristen/Rony.    SW sent email to brother/Ivan and Nephew/Luke to let them know pt is going to be private pay again.  Pt is not happy they are cutting him off.  However, pt does not fully participate in the offered therapies. Pt doesn't recognize this.     NEREYDA Sharp   [facility], Transitional Care Unit   Social Work   [facility] [place] [address], [address] [place] [address]  () [phone]

## 2023-09-11 NOTE — PLAN OF CARE
Pt. A&O x4, RA, VSS. Makes needs known.  States uses glasses but does not know where they are. Searched in personal belongings, informed pt. Glasses not in room.  Regular diet, takes pills whole.  Declines repositioning or LLE assessment due to severe pain.  Pain 8/10 managed w/ scheduled Tylenol and PRN Tramadol.  No BM this shift, brief changed. Prefers to use urinal, bedpan, and pull up briefs.  No acute issues this shift. Cont. With POC...

## 2023-09-12 NOTE — PLAN OF CARE
Goal Outcome Evaluation:  Patient is alert and oriented, able to make needs known, take medication whole with thin liquids , eats regular diet. Patient slept most time during this shift, and took scheduled meds at 6 am. Call light within reach, no acute issue noted at this time, will continue with poc.    Patient's most recent vital signs are:     Vital signs:  BP: 114/61  Temp: 97.3  HR: 75  RR: 18  SpO2: 95 %     Patient does not have new respiratory symptoms.  Patient does not have new sore throat.  Patient does not have a fever greater than 99.5.

## 2023-09-12 NOTE — PROGRESS NOTES
"CLINICAL NUTRITION SERVICES - REASSESSMENT NOTE     Nutrition Prescription    RECOMMENDATIONS FOR MDs/PROVIDERS TO ORDER:  Encourage intakes    Malnutrition Status:    Severe malnutrition in the context of chronic illness    Recommendations already ordered by Registered Dietitian (RD):  Nutrition education - importance of intakes for wound healing and overall health    Future/Additional Recommendations:  Monitor labs, intakes, and weight trends.     EVALUATION OF THE PROGRESS TOWARD GOALS   Diet: Regular  Intake/Tol[place]ace: 0 - 100% of documented meals.     Pt ordered 3 meals from kitchen over last week.      NEW FINDINGS/REVIEW OF SYSTEMS    Nutrition/GI: RD met with pt at bedside. RN finishing heel wound cares at start of visit. Pt states he has not eaten too much outside food due to lack of appetite. Discussed pt lack of ordering and RD concern for poor nutrition status. Explained the importance of nutrition in wound healing and overall health. Asked if pt had used the list of restaurants provided a couple weeks prior. Pt states \"they are just numbers on paper, they don't mean anything to me.\" Discussed recommendations for tube feeding. Pt states that he would never allow that. Explained reasoning for recommendation, pt states \"you can recommend all you want.\"  Pt upset that he now is not going to receive therapy and states he will never get better. Offered to send pt meals the way it was set up in the hospital. Pt stated he wouldn't eat the food.     Weights: Pt with 46 lb (18%) weight loss over 3 months, 111 lb (34%) weight loss in < 1 year.  Wt Readings from Last Encounters:   09/09/23 96.4 kg (212 lb 8.4 oz)   08/23/23 98.7 kg (217 lb 9.5 oz)   08/14/23 104 kg (229 lb 4.5 oz)    06/16/23 115.2 kg (254 lb)   06/06/23 117.4 kg (258 lb 13.1 oz)    05/26/23 110.4 kg (243 lb 4.8 oz)   05/19/23 110.4 kg (243 lb 4.8 oz)   03/23/23 117 kg (257 lb 14.4 oz)   03/06/23 117.7 kg (259 lb 8 oz)      01/09/23 119.7 kg (264 " lb)   11/22/22 147.1 kg (324 lb 4.8 oz)   11/14/22 142.9 kg (315 lb)   11/09/22 146.8 kg (323 lb 9.6 oz)   05/19/22 136.1 kg (300 lb)   07/03/18 148.9 kg (328 lb 4.8 oz)     Skin: Heel pressure wound     Labs reviewed   08/31/23 08:40 09/04/23 08:47 09/11/23 06:37   Sodium 139 139 140   Potassium 4.4 4.2 3.8   Urea Nitrogen 32.7 (H) 34.7 (H) 32.1 (H)   Creatinine 2.01 (H) 2.22 (H) 1.95 (H)   Glucose 103 (H) 102 (H) 94     Medications reviewed: Cipro, doxycycline hyclate, ferrous sulfate last given 9/01, folic acid, culturell, multivitamin, thiamine, vitamin D3      MALNUTRITION  % Intake: </=75% for >/= 1 month (severe)  % Weight Loss: >7.5% in 3 months (severe)  and >10% in 6 months (severe)   Subcutaneous Fat Loss: Facial/Jaw Region moderate and Upper Arm moderate-severe  Muscle Loss: Temporal moderate, Thoracic region (clavical, acromium bone, deltoid, trapezius, pectoral) moderate-severe, and Upper arm severe  Fluid Accumulation/Edema: Does not meet criteria   Malnutrition Diagnosis: Severe malnutrition in the context of chronic illness    Previous Goals   Patient to consume % of nutritionally adequate meal trays TID, or the equivalent with supplements/snacks.  Evaluation: Not met    Previous Nutrition Diagnosis  Inadequate oral intake related to poor appetite, pt dislike of hospital food as evidenced by pt report, documented intakes    Evaluation: No change    CURRENT NUTRITION DIAGNOSIS  Inadequate oral intake related to poor appetite, pt dislike of hospital food as evidenced by pt report, documented intakes     INTERVENTIONS  Implementation  Nutrition education for nutrition relationship to health/disease   Collaboration with other providers     Goals  Patient to consume % of nutritionally adequate meal trays TID, or the equivalent with supplements/snacks.    Monitoring/Evaluation  Progress toward goals will be monitored and evaluated per protocol.    Dee Alanis MS, RDN, LD  RD pager:  [phone]   Weekend/Holiday Pager: [phone]

## 2023-09-12 NOTE — PLAN OF CARE
Goal Outcome Evaluation:    VSS. Alert and orientedx4. Uncooperatvie. Incontinent of B/B. Linen changed. Declined repositioned even pt is already almost perpendicular to bed- Tried to align him in bed after many attempts w/c pt finally agreed. Pain to back and L heel wound, tramadol and lidocaine patch at the back applied. Declined martell and triad paste to coccyx, excoriated perineal area. Declined full skin assessment  Will continue plan of care.  Vital signs:  Temp: 98.4  F (36.9  C) Temp src: Axillary BP: 116/63 Pulse: 79   Resp: 18 SpO2: 92 % O2 Device: None (Room air)

## 2023-09-13 NOTE — DISCHARGE SUMMARY
Occupational Therapy Discharge Summary    Reason for therapy discharge:    Pt remains on TCU.  Further insurance coverage for OT services has been denied.     Progress towards therapy goal(s). See goals on Care Plan [facility] electronic health record for goal details.  Barriers to progress: th had to build rapport with patient, often times a direct approach was useful and pt needed extra time.  Pt was consistently participating in OT, more times than not, to improve his bed mobility, participate in cares and exercise and decrease time reclined in bed. Overall pt demo less assist when cooperative and engaged in theraputic activites, and pt showed potential for continued progress w/ mob and adls if he would consistently engage in therapy and be compliant w/ nursing needs/requests.  Pt missed the last three sessions, once due to schedule change/need for bedpan and twice as he expresses frustration with insurance not covering continued OT beyond today.    Therapy recommendation(s):     offered pt HEP, he agreed to keep a 3# dowel in his room but declined formal training on structured exercise. Per pt's response to not having further therapy,  feels pt effort to get EOB or OOB will decline. Recommend nsg/aides con't to assist pt OOB, a minimum of daily, using the mechanical lift.

## 2023-09-13 NOTE — PROGRESS NOTES
09/13/23 1251   Appointment Info   Signing Clinician's Name / Credentials (PT) Macy Florence PTA   PT Assistant Visit Number 4   Rehab Comments (PT) PT: - 45 min d/t refusal to participate   Therapeutic Activity   Therapeutic Activities: dynamic activities to improve functional performance Minutes (56813) 0   Treatment Detail/Skilled Intervention PT: see gg.....time not billable d/t Pt refusal   Total Session Time   Timed Code Treatment Minutes 0   Total Session Time (sum of timed and untimed services) 0   Post Acute Settings Only   What unit is patient on? TCU   Bed Mobility: Turning side to side/Roll Left and Right   Patient Performance Supervision or verbal cues   Staff Performance Set up help only   Describe Performance PT: cues for sequencing, BR performed in previous sessions   Bed Mobility: Sit to lying   Patient Performance Substantial/maximal assist   Staff Performance Two +person assist (two plus persons physical assist)   Describe Performance PT: max A x 2 as performed in previous sessions.   Bed Mobility: Lying to sitting on the side of bed   Patient Performance Supervision or verbal cues   Staff Performance Two +person assist (two plus persons physical assist)   Describe Performance PT: max A x 2 as performed in previous sessions.   Transfers: Sit to Stand   Reason Not Done Resident refused to perform   Describe Performance PT: Pt previously performed partial stand w/max A x 2 from recliner, then refused further sessions to work toward functional transfers.   Transfers: Chair/Bed transfers   Reason Not Done Safety concerns   Describe Performance PT: Pt requires ceiling lift as Pt unwilling to work on funtional activities   Ambulation   Walks in room: Reason Not Done Activity not applicable   Walks in denson:  Reason Not Done Activity not applicable   Walk 10 Feet   Reason Not Done Activity not applicable   Walk 10 Feet on uneven surfaces   Reason Not Done Activity not applicable   Walk 50 Feet with Two  Turns   Reason Not Done Activity not applicable   Walk 150 Feet   Reason Not Done Activity not applicable   Locomotion   Move on unit: Patient Performance Total dependence (helper does ALL of the effort)   Move on unit: Reason Not Done Resident refused to perform   Move off unit: Patient Performance Total dependence (helper does ALL of the effort)   Move off unit: Reason Not Done Resident refused to perform   Mobility device used manual wheelchair   Describe Performance PT: Pt refused to sit in w/c to work on functional locomotion   Wheel 50 Feet   Reason Not Done Resident refused to perform   Describe Performance PT: Pt refused to sit in w/c to work on functional locomotion   Wheel 150 Feet   Reason Not Done Resident refused to perform   Describe Performance PT: Pt refused to sit in w/c to work on functional locomotion   1 Step (curb)   Reason Not Done Safety concerns   4 Steps   Reason Not Done Medical condition   12 Steps   Reason Not Done Medical condition   Car Transfer   Reason Not Done Medical condition   Picking up Object   Reason Not Done Safety concerns

## 2023-09-13 NOTE — PLAN OF CARE
Goal Outcome Evaluation:  Pt.A&Ox4. Awakened ~0230 during rounds - laying on (L) side and politely asked writer to clean up his backside. Thorough pericares w/soft wipes, soap and water. Kelvin.buttocks, upper thighs and groin area very reddened, did not notice any OA's. Pt.refused barrier paste, requesting only barrier clear ointment. Called [facility] but currently out of stock. Pt.did reluctantly allow antifungal powder to kelvin.groin area. Pt.voiced frustration w/not getting his showers and stated this as the reason for his skin issues. Informed pt.his shower/bath days are Mon/Thu.- due tomorrow and pt.definitely wants. Will inform oncoming staff to continue alerting staff and also wrote it on pts' whiteboard. Continent of bladder using urinal indep.- staff emptied.     0620 - Awakened pt.for scheduled Tylenol and also accepted Tramadol 50mg d/t early OT @ 0715.        Patient's most recent vital signs are:     Vital signs:  BP: 131/63  Temp: 97.7  HR: 75  RR: 17  SpO2: 96 %     Patient does not have new respiratory symptoms.  Patient does not have new sore throat.  Patient does not have a fever greater than 99.5.

## 2023-09-13 NOTE — PLAN OF CARE
Goal Outcome Evaluation:       VSS. Alert and orientedx4. Able to communicate needs. A-2 lift with transfer. Did not get out of bed. Declined to be turned and repositioned. Declined getting into the recliner. Need a lot of encouragement for his cares, mobility, repositioning,  caridad care, etc due to pt declined. Declined triad paste and miconazole powder to excoriated buttocks. Health teaching done and educated patient the importance of moving and being active again.  L heel dressing CDI- reassess by wocn.   Will continue plan of care.  Vital signs:  Temp: 98.3  F (36.8  C) Temp src: Axillary BP: 136/68 Pulse: 81   Resp: 16 SpO2: 95 % O2 Device: None (Room air)

## 2023-09-13 NOTE — CARE PLAN
Care plan updated. Bedside RN to assess on progression and update.   Problem: Hip Arthroplasty  Goal: Optimal Functional Ability  Description: Implement multidisciplinary rehabilitation following early mobility guidelines; coordinate pain control to optimize comfort with activity.    Identify functional limitations, such as ADL (activities of daily living), mobility safety and independence; encourage optimal participation to minimize decline associated with inactivity.    Facilitate functional mobility, such as bed mobility, transfers and ambulation; progress and retrain as tolerated.    Encourage ADL (activities of daily living), such as self-feeding, hygiene and dressing; provide set-up, adaptations, assistance and extra time as needed.    Promote a safe and accessible environment and effective use of assistive devices and equipment.    Pace and cluster activity to balance with rest periods and conserve energy; promote adequate nutrition, sleep and rest.    Evaluate and address performance deficits, such as cognitive, balance and activity tolerance impairments.    Facilitate range of motion and prescribed exercises, such as isometric gluteal and quadriceps exercise.         Problem: Diabetes Comorbidity  Goal: Blood Glucose Level Within Targeted Range  Description: Establish target blood glucose levels based on patient-specific factors, such as age, diabetes-related complications and illness severity.    Document blood glucose levels and monitor trend; advocate for adjustment to keep within targeted range.    Provide pharmacologic therapy to maintain blood glucose levels within targeted range.    Check blood glucose level if there is a change in mental or cognitive status.         Problem: Pain Chronic (Persistent) (Comorbidity Management)  Goal: Acceptable Pain Control and Functional Ability  Description: Evaluate pain level, effect of treatment and patient response at regular intervals.    Minimize pain stimuli;  coordinate care and adjust environment (e.g., light, noise, unnecessary movement); promote sleep/rest.    Match pharmacologic analgesia to severity and type of pain mechanism (e.g., neuropathic, muscle, inflammatory); consider multimodal approach (e.g., nonopioid, opioid, adjuvant).    Provide medication at regular intervals; titrate to patient response.    Manage breakthrough pain with additional doses; consider rotation or switching medication         Problem: Obstructive Sleep Apnea Risk or Actual  Goal: Unobstructed Breathing During Sleep  Description: Use a validated screening tool to identify risk for JAIR (obstructive sleep apnea).    Implement continuous pulse oximetry to detect apnea-related oxygen desaturation events.    Encourage a nonsupine sleep position to prevent airway collapse, such as side-lying or head of bed elevated.    Minimize use of sedative, opioid and benzodiazepine medication that may contribute to respiratory depression.    Provide oxygen therapy during sleep to reduce hypoxemia.    Continue use of home prescribed continuous PAP (positive airway pressure) during sleep; utilize home device and settings if available.    Implement continuous PAP during sleep for high-risk of JAIR; consider auto-titrating PAP, humidification and mask choice (including fit and style) to improve comfort and tolerance.    Provide behavioral and supportive interventions individualized to the patient needs and preferences to improve PAP adherence.    Facilitate polysomnography (sleep study) referral if needed.       Problem: Urinary Incontinence  Goal: Effective Urinary Elimination  Description: Identify cause and contributing factors, such as disease process, medication or treatment side effects, infection or dietary bladder irritants; provide treatment.    Assess for general symptoms, such as fatigue, depression, weakness, confusion, sensory alterations that could impact toileting.    Provide safe and ready access  to toileting devices and aids (e.g., commode, urinal).    Promote behavioral methods, such as prompted toileting, elimination schedule, relaxation techniques or voiding posture to facilitate flow.    Consider pelvic floor muscle strengthening, such as pelvic floor muscle training, vaginal cone, bladder support or neuromodulation.    Ensure bladder emptying (e.g., intermittent catheterization, portable bladder ultrasound after voiding).       Problem: Bowel Elimination Management  Goal: Effective Bowel Elimination/Continence  Description: Develop and implement a bowel program individualized to type of bowel dysfunction, patient needs and abilities.     Incorporate positioning, natural reflexes or use of physical interventions, such as abdominal massage, digital stimulation, anal irrigation    Encourage regular timing and schedule for elimination.    Encourage fluid intake and intake of dietary fiber, such as whole grains, fruits and vegetables.    Facilitate management of hygiene and cleaning following evacuation and episodes of bowel incontinence.           Problem: Infection  Goal: Absence of Infection Signs and Symptoms  Description: Implement transmission-based precautions and isolation, as indicated, to prevent spread of infection.    Obtain cultures prior to initiating antimicrobial therapy, when possible. Do not delay treatment for laboratory results in the presence of high suspicion or clinical indicators.    Administer ordered antimicrobial therapy promptly; reassess need regularly.    Monitor laboratory value, diagnostic test and clinical status trends for signs of infection progression.    Identify early signs of sepsis, such as increased heart rate and decreased blood pressure, as well as changes in mental state, respiratory pattern or peripheral perfusion.           Problem: BADL (Basic Activities of Daily Living) Impairment  Goal: Optimal Safe BADL Performance  Description: Assess BADL (basic activity  of daily living) abilities; encourage participation at maximally safe independent level.    Provide assistance and supervision needed to maintain safety; involve caregiver in BADL (basic activity of daily living) training.    Ensure effective use of equipment or devices, such as a long-handled reacher, shower seat or orthosis.    Ensure proper body mechanics and positioning for optimal task performance.    Provide set-up of items if patient is unable to retrieve; store personal care items in accessible location.    Schedule BADL (basic activity of daily living) activities when pain and fatigue are at a minimum; pace activity to conserve energy.           Problem: Skin Injury Risk Increased  Goal: Skin Health and Integrity  Description: Perform a full pressure injury risk assessment, as indicated by screening, upon admission to care unit.    Reassess skin (full inspection and injury risk, including skin temperature, consistency and color) frequently (e.g., scheduled interval, with change in condition) to provide optimal early detection and prevention.    Maintain adequate tissue perfusion (e.g., encourage fluid balance; avoid crossing legs, constrictive clothing or devices) to promote tissue oxygenation.    Maintain head of bed at lowest degree of elevation tolerated, considering medical condition and other restrictions. Use positioning supports to prevent sliding and friction. Consider low friction textiles.    Avoid positioning onto an area that remains reddened or on bony prominences.           Problem: Malnutrition  Goal: Improved Nutritional Intake  Description: Perform a nutrition assessment; include a nutrition-focused physical exam.    Determine calorie, protein, vitamin, mineral and fluid requirements.    Assess for micronutrient deficiencies; supplement if depleted.    Assess need and assist with meal set-up and feeding.    Adjust diet or meal schedule based on preferences and tolerance.    Minimize  unnecessary dietary restrictions to increase oral intake.         Problem: Depressive Signs/Symptoms  Goal: Improved Mood Symptoms (Depressive Signs/Symptoms)  Description: Provide opportunity for patient and family/caregiver to express feelings, stressors and self-perception (e.g., verbalization, journaling).    Convey caring approach, empathy and potential for change; hope is key for recovery.    Utilize existing coping strategies and assist in developing new strategies, such as relaxation, music and problem-solving; assess for ineffective strategies (e.g., substance use, isolation).    Recognize past and present achievements, successes and personal strengths.    Empower participation in planning care and activities, as well as development of attainable goals, to increase self-esteem and feelings of control.    Promote self-care; support balanced sleep, physical activity and nutrition         Problem: Fall Injury Risk  Goal: Absence of Fall and Fall-Related Injury  Description: Provide a safe, barrier-free environment that encourages independent activity.    Keep care area uncluttered and well-lighted.    Determine need for increased observation or monitoring.    Avoid use of devices that minimize mobility, such as restraints or indwelling urinary catheter.           Problem: Cognitive Impairment  Goal: Optimal Cognitive Function  Description: Assess cognitive function using a standardized tool to establish baseline and identify areas of deficit.    Establish rapport and trust; utilize unhurried and calm approach.    Utilize clear communication; address patient by name, speak slowly with simple directions and gestures; provide time for patient response, listen carefully and maintain eye contact.    Provide environmental adaptations to support cognitive function and safety; consider sensory exposure, hearing ability and distractions.

## 2023-09-13 NOTE — PROGRESS NOTES
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  Underwent I&D with Dr. Meyers on 6/21/23.  Currently on prolonged oral antibiotic course.  He is transferred back to TCU on 8/28 for PT and OT.       Assessment:      Areas visualized during today's visit: Lower extremities     Pressure Injury Location: Left heel        9/6 9/13  Last photo: 9/6    Wound type: Pressure Injury     Pressure Injury Stage: Unstageable, hospital acquired, assessed with SANDRA Cook on 7/28  Wound history/plan of care:  Pt known to [facility] service. Pt known to decline cares and repositioning.   7/19 initial assessment: Spent approx 20 mins on negotiating how to move leg to be able to minimally assess wound. Pt agreed to see picture of wound. This writer explained wound to pt using the photo. Discussed with patient if he would like legs elevated on pillows or boots. He agreed to pillows.    7/20: Assessed wound with Kristen Hadley RN CWOCN. Confirmed thick slough/eschar wound base. Again discussed keeping heels off bed with pt.  7/25/2023: Of note, patient has refused and continues to refuse heel off-loading boots on assessment today.  7/28: pt had heel lift boots in place  Wound base: 100 % slough     Palpation of the wound bed: normal      Drainage: scant     Description of drainage: serosanguinous     Measurements (length x width x depth, in cm) 2.7 x 1.7 x 0.3 cm     Tunneling N/A     Undermining N/A  Periwound skin: Intact      Color: pink       Temperature: normal   Odor: none  Pain: severe, shooting and stabbing  Pain intervention prior to dressing change: slow and gentle cares   Treatment goal: Infection control/prevention and soften nonviable tissue  STATUS: evolving now with slough base   Supplies ordered: discussed with RN and discussed with patient     9/13: heel on bed- pillows conpressed    Wound location: left buttock and gluteal cleft    Last photo: 8/22- new photo did not download 8/31, remains similar to photo above with superficial scratch marks  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)  Wound history/plan of care: pt is incontinent and has previously refused assessment of area by [facility]  Wound base: 90 % epidermis 10% dermis     Palpation of the wound bed: normal      Drainage: none     Description of drainage: none     Measurements (length x width x depth, in cm): see photo, breakdown in cleft and on left buttock      Tunneling: N/A     Undermining: N/A  Periwound skin: Intact and Erythema- blanchable      Color: red      Temperature: normal   Odor: none  Pain: moderate, sharp- mostly related to left hip pain  Pain interventions prior to dressing change: slow and gentle cares   Treatment goal: Maintain (prevention of deterioration) and Protection  STATUS: stable  Supplies ordered: at bedside, discussed with RN, and discussed with patient      9/13: pt is refusing antifungal powder and barrier paste       Treatment Plan:     Bilateral buttocks and gluteal cleft wounds: BID and PRN with incontinence episodes  Cleanse wound with dry wipes and martell cleanse and protect spray.   Apply light layer of Triad paste (#062254) to wounds and red skin on bilateral buttocks  With repeat application, do not scrub the paste, only remove soiled paste and reapply.  If complete removal of paste is necessary use baby oil/mineral oil (#297721) and soft wash cloth.  Ensure pt has Isaac-cushion (#906767) while sitting up in the chair.  Use only one  Covidien pad in between mattress and pt. No brief in bed.    Left heel wound(s): Every other day   Strongly encourage pt to elevate heels on pillows to float off bed surface at all times. (He agreed to this with WOC)  Cleanse wound with wound cleanser.   Apply light layer of Triad paste (#[id number]) to wound bed  Secure with mepilex.  If patient agrees to heel off-loading boots, please apply.    Orders: Reviewed    RECOMMEND PRIMARY TEAM ORDER: None, at this time  Education provided: importance of repositioning, plan of care, wound progress and Infection prevention   Discussed plan of care with: Patient and Nurse  WO nurse follow-up plan: weekly, stable POC  Notify WOC if wound(s) deteriorate.  Nursing to notify the Provider(s) and re-consult the WO Nurse if new skin concern.    DATA:     Current support surface: Bariatric Low air loss (BIN pump, Isolibrium, Pulsate, skin guard, etc)  Containment of urine/stool: Incontinence Protocol  BMI: Body mass index is 30.49 kg/m .   Active diet order: Orders Placed This Encounter      Regular Diet Adult     Output: I/O last 3 completed shifts:  In: 190 [P.O.:190]  Out: 470 [Urine:470]     Labs:   Recent Labs   Lab 09/11/23  0637   HGB 8.4*   WBC 9.8       Pressure injury risk assessment:   Sensory Perception: 3-->slightly limited  Moisture: 3-->occasionally moist  Activity: 2-->chairfast  Mobility: 2-->very limited  Nutrition: 2-->probably inadequate  Friction and Shear: 2-->potential problem  Rafael Score: 14    Mona Horton RN  CWOCN  Pager no longer is use, please contact through [employer] group: [facility] Nurse [facility]  Dept. Office Number: [phone]

## 2023-09-13 NOTE — PLAN OF CARE
"Goal Outcome Evaluation:      Plan of Care Reviewed With: patient    Overall Patient Progress: no changeOverall Patient Progress: no change    Outcome Evaluation: Patient was somnolent and withdrawn during the evening. After linen was changed, frequent repositioning for comfort, back massage/lotion applied and time listening to patient was in better spirits and planned to call loved ones. Patient continues to refuse interventions to improve or maintain skin integrity. Assessment of lower extremities not permited due to sensitivity of area--patient refused sock removal or assessment of tender areas. Of note, coccygeal/sacral/buttocks area is worsening and very uncomfortable for patient.      Problem: Plan of Care - These are the overarching goals to be used throughout the patient stay.    Goal: Plan of Care Review  Description: The Plan of Care Review/Shift note should be completed every shift.  The Outcome Evaluation is a brief statement about your assessment that the patient is improving, declining, or no change.  This information will be displayed automatically on your shift note.  Outcome: Progressing  Flowsheets (Taken 9/12/2023 2129)  Outcome Evaluation: Patient was somnolent and withdrawn during the evening. After linen was changed, frequent repositioning for comfort, back massage/lotion applied and time listening to patient was in better spirits and planned to call loved ones. Patient continues to refuse interventions to improve or maintain skin integrity. Assessment of lower extremities not permited due to sensitivity of area--patient refused sock removal or assessment of tender areas. Of note, coccygeal/sacral/buttocks area is worsening and very uncomfortable for patient.  Plan of Care Reviewed With: patient  Overall Patient Progress: no change  Goal: Patient-Specific Goal (Individualized)  Description: You can add care plan individualizations to a care plan. Examples of Individualization might be:  \"Parent " "requests to be called daily at 9am for status\", \"I have a hard time hearing out of my right ear\", or \"Do not touch me to wake me up as it startles me\".  Outcome: Progressing  Goal: Absence of Hospital-Acquired Illness or Injury  Outcome: Progressing  Goal: Optimal Comfort and Wellbeing  Outcome: Progressing  Goal: Readiness for Transition of Care  Outcome: Progressing     Problem: Individualization  Goal: Patient Preferences  Outcome: Progressing     Problem: TCU Patient Goals  Goal: TCU Plan of Care  Outcome: Progressing     Problem: Goal Outcome Summary  Goal: Goal Outcome Summary  Outcome: Progressing     Problem: Hip Arthroplasty  Goal: Optimal Functional Ability  Description: Implement multidisciplinary rehabilitation following early mobility guidelines; coordinate pain control to optimize comfort with activity.    Identify functional limitations, such as ADL (activities of daily living), mobility safety and independence; encourage optimal participation to minimize decline associated with inactivity.    Facilitate functional mobility, such as bed mobility, transfers and ambulation; progress and retrain as tolerated.    Encourage ADL (activities of daily living), such as self-feeding, hygiene and dressing; provide set-up, adaptations, assistance and extra time as needed.    Promote a safe and accessible environment and effective use of assistive devices and equipment.    Pace and cluster activity to balance with rest periods and conserve energy; promote adequate nutrition, sleep and rest.    Evaluate and address performance deficits, such as cognitive, balance and activity tolerance impairments.    Facilitate range of motion and prescribed exercises, such as isometric gluteal and quadriceps exercise.    Outcome: Progressing     Problem: Diabetes Comorbidity  Goal: Blood Glucose Level Within Targeted Range  Description: Establish target blood glucose levels based on patient-specific factors, such as age, " diabetes-related complications and illness severity.    Document blood glucose levels and monitor trend; advocate for adjustment to keep within targeted range.    Provide pharmacologic therapy to maintain blood glucose levels within targeted range.    Check blood glucose level if there is a change in mental or cognitive status.    Outcome: Progressing     Problem: Pain Chronic (Persistent) (Comorbidity Management)  Goal: Acceptable Pain Control and Functional Ability  Description: Evaluate pain level, effect of treatment and patient response at regular intervals.    Minimize pain stimuli; coordinate care and adjust environment (e.g., light, noise, unnecessary movement); promote sleep/rest.    Match pharmacologic analgesia to severity and type of pain mechanism (e.g., neuropathic, muscle, inflammatory); consider multimodal approach (e.g., nonopioid, opioid, adjuvant).    Provide medication at regular intervals; titrate to patient response.    Manage breakthrough pain with additional doses; consider rotation or switching medication    Outcome: Progressing     Problem: Obstructive Sleep Apnea Risk or Actual  Goal: Unobstructed Breathing During Sleep  Description: Use a validated screening tool to identify risk for JAIR (obstructive sleep apnea).    Implement continuous pulse oximetry to detect apnea-related oxygen desaturation events.    Encourage a nonsupine sleep position to prevent airway collapse, such as side-lying or head of bed elevated.    Minimize use of sedative, opioid and benzodiazepine medication that may contribute to respiratory depression.    Provide oxygen therapy during sleep to reduce hypoxemia.    Continue use of home prescribed continuous PAP (positive airway pressure) during sleep; utilize home device and settings if available.    Implement continuous PAP during sleep for high-risk of JAIR; consider auto-titrating PAP, humidification and mask choice (including fit and style) to improve comfort and  tolerance.    Provide behavioral and supportive interventions individualized to the patient needs and preferences to improve PAP adherence.    Facilitate polysomnography (sleep study) referral if needed.  Outcome: Progressing     Problem: Urinary Incontinence  Goal: Effective Urinary Elimination  Description: Identify cause and contributing factors, such as disease process, medication or treatment side effects, infection or dietary bladder irritants; provide treatment.    Assess for general symptoms, such as fatigue, depression, weakness, confusion, sensory alterations that could impact toileting.    Provide safe and ready access to toileting devices and aids (e.g., commode, urinal).    Promote behavioral methods, such as prompted toileting, elimination schedule, relaxation techniques or voiding posture to facilitate flow.    Consider pelvic floor muscle strengthening, such as pelvic floor muscle training, vaginal cone, bladder support or neuromodulation.    Ensure bladder emptying (e.g., intermittent catheterization, portable bladder ultrasound after voiding).    Outcome: Progressing     Problem: Bowel Elimination Management  Goal: Effective Bowel Elimination/Continence  Description: Develop and implement a bowel program individualized to type of bowel dysfunction, patient needs and abilities.     Incorporate positioning, natural reflexes or use of physical interventions, such as abdominal massage, digital stimulation, anal irrigation    Encourage regular timing and schedule for elimination.    Encourage fluid intake and intake of dietary fiber, such as whole grains, fruits and vegetables.    Facilitate management of hygiene and cleaning following evacuation and episodes of bowel incontinence.      Outcome: Progressing     Problem: Infection  Goal: Absence of Infection Signs and Symptoms  Description: Implement transmission-based precautions and isolation, as indicated, to prevent spread of infection.    Obtain  cultures prior to initiating antimicrobial therapy, when possible. Do not delay treatment for laboratory results in the presence of high suspicion or clinical indicators.    Administer ordered antimicrobial therapy promptly; reassess need regularly.    Monitor laboratory value, diagnostic test and clinical status trends for signs of infection progression.    Identify early signs of sepsis, such as increased heart rate and decreased blood pressure, as well as changes in mental state, respiratory pattern or peripheral perfusion.      Outcome: Progressing     Problem: BADL (Basic Activities of Daily Living) Impairment  Goal: Optimal Safe BADL Performance  Description: Assess BADL (basic activity of daily living) abilities; encourage participation at maximally safe independent level.    Provide assistance and supervision needed to maintain safety; involve caregiver in BADL (basic activity of daily living) training.    Ensure effective use of equipment or devices, such as a long-handled reacher, shower seat or orthosis.    Ensure proper body mechanics and positioning for optimal task performance.    Provide set-up of items if patient is unable to retrieve; store personal care items in accessible location.    Schedule BADL (basic activity of daily living) activities when pain and fatigue are at a minimum; pace activity to conserve energy.      Outcome: Progressing     Problem: Skin Injury Risk Increased  Goal: Skin Health and Integrity  Description: Perform a full pressure injury risk assessment, as indicated by screening, upon admission to care unit.    Reassess skin (full inspection and injury risk, including skin temperature, consistency and color) frequently (e.g., scheduled interval, with change in condition) to provide optimal early detection and prevention.    Maintain adequate tissue perfusion (e.g., encourage fluid balance; avoid crossing legs, constrictive clothing or devices) to promote tissue  oxygenation.    Maintain head of bed at lowest degree of elevation tolerated, considering medical condition and other restrictions. Use positioning supports to prevent sliding and friction. Consider low friction textiles.    Avoid positioning onto an area that remains reddened or on bony prominences.      Outcome: Progressing     Problem: Malnutrition  Goal: Improved Nutritional Intake  Description: Perform a nutrition assessment; include a nutrition-focused physical exam.    Determine calorie, protein, vitamin, mineral and fluid requirements.    Assess for micronutrient deficiencies; supplement if depleted.    Assess need and assist with meal set-up and feeding.    Adjust diet or meal schedule based on preferences and tolerance.    Minimize unnecessary dietary restrictions to increase oral intake.    Outcome: Progressing     Problem: Depressive Signs/Symptoms  Goal: Improved Mood Symptoms (Depressive Signs/Symptoms)  Description: Provide opportunity for patient and family/caregiver to express feelings, stressors and self-perception (e.g., verbalization, journaling).    Convey caring approach, empathy and potential for change; hope is key for recovery.    Utilize existing coping strategies and assist in developing new strategies, such as relaxation, music and problem-solving; assess for ineffective strategies (e.g., substance use, isolation).    Recognize past and present achievements, successes and personal strengths.    Empower participation in planning care and activities, as well as development of attainable goals, to increase self-esteem and feelings of control.    Promote self-care; support balanced sleep, physical activity and nutrition    Outcome: Progressing

## 2023-09-14 NOTE — PLAN OF CARE
Pt is alert and oriented x4. Able to make needs known to staff. Incontinent of bowel and bladder. Refuse all his medication during shift. Assist of two with liko lift. Pt has  a lot of redness on his butt, triad paste was apply. Will continue plan of care.        Patient's most recent vital signs are:     Vital signs:  BP: 139/68  Temp: 95.7  HR: 77  RR: 16  SpO2: 96 %     Patient does not have new respiratory symptoms.  Patient does not have new sore throat.  Patient does not have a fever greater than 99.5.

## 2023-09-14 NOTE — PROGRESS NOTES
Goal Outcome Evaluation:       Plan of Care Reviewed With: patient     Overall Patient Progress: no changeOverall Patient Progress: no change     Outcome Evaluation: Patient was somnolent and withdrawn during the evening. Linen was changed, frequent repositioning for comfort. Patient reported he was comfortable in the position he was in eventually and refused to be repositioned to offload pressure from his back.  Patient continues to refuse interventions to improve or maintain skin integrity. Assessment of lower extremities not permited due to sensitivity of area--patient refused sock removal or assessment of tender areas. Of note, coccygeal/sacral/buttocks area is worsening and very uncomfortable for patient. Patient prefers soap and water over wound cleansers for perineal care.

## 2023-09-14 NOTE — PLAN OF CARE
Physical Therapy Discharge Summary    Reason for therapy discharge:    Pt is historically declining to participate in PT sessions  and pt's insurance has been cut off due to lack of participation and progress.   Pt has been bed bound for 10 months, with pain LLE and has not been able to bear weight  on LLE.      Progress towards therapy goal(s). See goals on Care Plan in [facility] electronic health record for goal details.  Goals not met.  Barriers to achieving goals:   limited tolerance for therapy and difficulty  participating  with transfers and wc mobility. .Therapy team on TCU has approached patient and worked on rapport to attempt to work to improve pt's functional mobility.  Pt has been needing A of 2 for sitting EOB and to stand with walker.  Pt's insurance coverage has been discontinued.     Therapy recommendation(s):  Nursing will need to assist patient out of bed , and assist with mobility as pt is able.  Pt would benefit from getting OOB to improve activity tolerance , and to prevent skin issues, and cardiopulmonary issues.

## 2023-09-14 NOTE — PLAN OF CARE
Goal Outcome Evaluation:  Overall Pt had no acute issue this shift. Pt is alert and oriented x 4. Able to make needs known. Occasionally incontinent of both bowel and bladder. Spent the entire shift in bed. Denied having  discomfort this shift. No complain at this time. Will continue with POC.       Patient's most recent vital signs are:     Vital signs:  BP: 124/67  Temp: 97  HR: 78  RR: 16  SpO2: 99 %     Patient does not have new respiratory symptoms.  Patient does not have new sore throat.  Patient does not have a fever greater than 99.5.

## 2023-09-14 NOTE — PROGRESS NOTES
CHIEF COMPLAINT: The patient presents for CEE with some distance vision blur    HISTORY OF PRESENT ILLNESS:  agree with tech note, denies pain, redness or discharge and denies vision changes.      ASSESSMENT/PLAN      ASSESSMENT: Cortical cataract of both eyes  (primary encounter diagnosis)  Comment: affecting VA and lifestyle  Plan: og is not sure he wants to pursue surgery at this time.  He will call back for an appointment with Dr. Gonzalez if he wants to have surgery         [facility] sent an email to [employer]an/[employer] asking for $1,000 for deposit for PP. As today is uncovered and MA is still not active.  SW included Franca/FERDINAND on it as well.     NEREYDA Sharp   [facility], Transitional Care Unit   Social Work   [address] [place] [address], [address] [place] [address]  (ph) [phone]

## 2023-09-15 NOTE — PLAN OF CARE
Goal Outcome Evaluation:    Pt is alert and oriented x 4 , can make needs known. Regular diet , take pills whole , assist of 2 with liko lift, Pt was continent during this shift used bed pan and urinal ,took medication with no problem. Pt comfortable in bed during safety rounds.No acute issue , call light within reach, continue with plan of care.        Patient's most recent vital signs are:     Vital signs:  BP: 133/67  Temp: 97.6  HR: 82  RR: 18  SpO2: 91 %     Patient does not have new respiratory symptoms.  Patient does not have new sore throat.  Patient does not have a fever greater than 99.5.

## 2023-09-15 NOTE — PLAN OF CARE
"Goal Outcome Evaluation:    VS: /67   Pulse 66   Temp 97.8  F (36.6  C) (Axillary)   Resp 16   Wt 96.4 kg (212 lb 8.4 oz)   SpO2 99%   BMI 30.49 kg/m     O2: RA   Output: Urinal at bedside   Last BM: 9/14   Activity: Refused repositioning or offers to get OOB   Skin: Buttocks denuded  Left heel wound UTV; pt refusing having LLE touched this shift.    Pain: LLE/heel   CMS:  Neuro: X: LLE  A&O   Dressing: LLE heel   Diet: Reg, pt has snacks in room; typically orders dinner from outside    LDA: N/a    Equipment: Liko   Plan: Con't POC.    Additional Info: Pt requesting to use a floor pedal machine to increase strength, since \"PT quit on me.\"     Patient's most recent vital signs are:     Vital signs:  BP: 124/67  Temp: 97.8  HR: 66  RR: 16  SpO2: 99 %     Patient does not have new respiratory symptoms.  Patient does not have new sore throat.  Patient does not have a fever greater than 99.5.    "

## 2023-09-16 NOTE — PLAN OF CARE
Pt is alert and oriented x4. Able to make needs known to staff. Incontinent of bowel and bladder.  Assist of two with liko lift. Pt denies SOB,CP and N/V. No acute issue during shift.  Will continue plan of care.           Patient's most recent vital signs are:     Vital signs:  BP: 131/69  Temp: 97.8  HR: 66  RR: 16  SpO2: 99 %     Patient does not have new respiratory symptoms.  Patient does not have new sore throat.  Patient does not have a fever greater than 99.5.

## 2023-09-16 NOTE — PLAN OF CARE
Goal Outcome Evaluation:  Patient is alert and oriented, able to make needs known to staff, takes medication whole with thin liquids, eats regular diet. Patient was seen sleeping during safety checks. Took scheduled meds at 6 am with thin liquid. Call light within reach, no acute issue noted at this time, will continue with poc.       Patient's most recent vital signs are:     Vital signs:  BP: 131/69  Temp: 97.8  HR: 66  RR: 16  SpO2: 99 %     Patient does not have new respiratory symptoms.  Patient does not have new sore throat.  Patient does not have a fever greater than 99.5.

## 2023-09-16 NOTE — PLAN OF CARE
"Goal Outcome Evaluation:    VS: /68 (BP Location: Right arm)   Pulse 64   Temp (!) 95.7  F (35.4  C) (Oral)   Resp 16   Wt 96.4 kg (212 lb 8.4 oz)   SpO2 98%   BMI 30.49 kg/m      O2: RA   Output: Urinal at bedside - incontinent   Last BM: 9/15, bedpan   Activity: Refused repositioning or offers to get OOB  Pt requested to be dressed in regular clothes; shorts and shirt applied.    Skin: Buttocks denuded - improving slightly. Pt scratching.   Left heel wound UTV; pt refusing to have LLE touched this shift.    Pain: LLE/heel   CMS:  Neuro: X: LLE  A&O   Dressing: LLE heel   Diet: Reg, pt has snacks in room; typically orders dinner from outside    LDA: N/a    Equipment: Liko   Plan: Con't POC.    Additional Info: Pt requesting to use a floor pedal machine to increase strength, since \"PT quit on me.\"  Linens changed; pt's skin cleaned and lotioned.      Patient's most recent vital signs are:     Vital signs:  BP: 120/68  Temp: 95.7  HR: 64  RR: 16  SpO2: 98 %     Patient does not have new respiratory symptoms.  Patient does not have new sore throat.  Patient does not have a fever greater than 99.5.    "

## 2023-09-16 NOTE — PROGRESS NOTES
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 Was transferred to  TCU where he completed IV abx course, became profoundly deconditioned but refused mobilization, and ultimately underwent reimplant of hip 5/2023. Most recently readmitted to hospital 6/17-8/28/23 w/ encephalopathy and MRSA bacteremia, underwent left hip I&D on 6/21/23. [facility] for [facility].       MRSA bacteremia, suspected 2/2 left hip PJI and associated soft tissue infection  Left hip recurrent PJI s/p DAIR (6/21/23)  Physical deconditioning  Most recently readmitted to hospital w/ MRSA bacteremia (positive blood cultures 6/14-6/17, cleared since 6/19 and KEILA was w/o IE). There was purulent drainage at incision site and CT concerning for infection. Synovial fluid aspiration 6/17 grew MRSA, corynebacterium, klebsiella. The bacteremia was suspected d/t recurrent left hip PJI and associated soft tissue infection (nidus potentially the indwelling drain). Went for debridement and implant retention on 6/21 w/ Dr. Meyers. Intra op cultures w/ MRSA and pseudomonas. ID following. Last CRP normal. As above, patient has struggled w/ recurrent left hip PJIs since 2018, explanted 11/2022, second stage 5/2023 following aspiration w/ negative cultures prior to hardware reinsertion.    - Continue PO Cipro and doxycycline (started 8/2) until at least next ID clinic visit on 9/20 with Dr. Mercer   - May need suppressive abx after finishing 12 week treatment course of DAIR d/t retained hardware   - Scheduled in Ortho Clinic with PA on 9/25,  could see if inpatient team prefers to see at TCU   - Continue posterior hip precautions x3 months (~9/21)   - Patient continues to be depressed and refuses mobility. Declined Psychology, Psychiatry, Palliative Care consults. Will need LTC. Continue attempts at PT/OT for now w/ clear documentation of refusals.     Left foot and ankle pain  Left unstageable hospital-acquired heel pressure ulcer, POA  Likely secondary to heel ulcer. XR left ankle 8/29 w/ osteopenia, no fractures, mild degenerative changes.    - WOCN following heel   - Pain mgmt: Patient resistant to pain regimen despite ongoing complaints of pain that limits his ability to participate in therapy, not willing to try different medications or adjust current regimen.    - Continue Tylenol TID + PRN, PRN Tramadol, PRN Robaxin.      Chronic neck pain:  MRI 8/13 w/ multilevel cervical degenerative changes, greatest at C6-7. Gabapentin was trialed inpatient but stopped d/t somnolence and patient has not been open to retrial at lower dose. Denies any worsening of neck pain currently.    - Monitor, symptomatic management.      Adjustment disorder with depressed mood and passive SI:  Has been admitted here or hospital since 11/2022. Is severely depressed w/ frequent passive suicidal statements - states he has no plan.    - Continues to refuse psychology/psychiatry involvement.      HALEY on CKD I-II:  Baseline Cr 0.8-1. Peaked at ~6 on 7/25/23. Most recently stable ~2. HALEY was attributed to significant hemodynamic changes including diuresis and and on vancomycin previously. HD line placed inpatient but ultimately did not require dialysis - IR removed tunneled line on 9/5.    - BMP qMTh   - Needs outpatient Nephrology follow-up, patient not currently interested in clinic visit, can revisit PRN        Other Stable Medical Issues:  Severe malnutrition: In acute on chronic illness. RD following.  Chronic normocytic anemia: Hgb stable mid 7s.   Hypertension:  "Normotensive. Continue amlodipine and carvedilol.   Hx of VTE: On lifelong anticoagulation - continue apixaban.   JAIR: Continue home CPAP.          Diet: Regular Diet Adult    DVT Prophylaxis: DOAC  Tran Catheter: Not present  Lines: None     Cardiac Monitoring: None  Code Status: Full Code      Clinically Significant Risk Factors                         # Obesity: Estimated body mass index is 30.49 kg/m  as calculated from the following:    Height as of 8/23/23: 1.778 m (5' 10\").    Weight as of this encounter: 96.4 kg (212 lb 8.4 oz).   # Severe Malnutrition: based on nutrition assessment           Disposition Plan         The patient's care was discussed with the Patient.    Lang Blakely PA-C  Hospitalist Service  [facility]  Securely message with [employer] (more info)  Text page via [facility] Paging/Directory   ______________________________________________________________________    Interval History   Stable pain in L foot due to heel wound, hips, low back, and neck. Attributes most pain to bed-ridden status. Upset that he can't get further therapy.     Physical Exam   Vital Signs: Temp: 97.9  F (36.6  C) Temp src: Axillary BP: 118/65 Pulse: 75   Resp: 16 SpO2: 97 % O2 Device: None (Room air)    Weight: 212 lbs 8.38 oz    General Appearance:  Awake. Alert. Oriented x3. NAD.   HEENT:  Moist mucous membranes.   Respiratory:  Normal effort. CTAB.   Cardiovascular:  RRR. S1/S2. No murmurs.   GI:  Soft, non-distended, non-tender, +BS.   Extremity:  No pitting edema. No calf tenderness.   Skin:  No visible rash. Left heel wound dressings in place, no surrounding erythema or soft tissue swelling.  Neuro:  Grossly non-focal.     Medical Decision Making       45 MINUTES SPENT BY ME on the date of service doing chart review, history, exam, documentation & further activities per the note.      Data          "

## 2023-09-17 NOTE — PLAN OF CARE
Goal Outcome Evaluation:  Patient is alert and oriented, able to make needs known to staff, takes medication whole with thin liquids, eats regular diet. Patient was seen sleeping most time during this shift. Took scheduled tylenol at 6 am with thin liquid. Call light within reach, no acute issue noted at this time, will continue with poc.           Patient's most recent vital signs are:     Vital signs:  BP: 120/68  Temp: 95.7  HR: 64  RR: 16  SpO2: 98 %     Patient does not have new respiratory symptoms.  Patient does not have new sore throat.  Patient does not have a fever greater than 99.5.

## 2023-09-17 NOTE — PLAN OF CARE
Goal Outcome Evaluation: Ongoing       Plan of Care Reviewed With: patient    Overall Patient Progress: no changeOverall Patient Progress: no change       VS: /71(BP Location: Right arm)   Pulse 66   Temp (!) 97.5  F (Oral)   Resp 16   Wt 96.4 kg (212 lb 8.4 oz)   SpO2 98%   BMI 30.49 kg/m      O2: RA   Output: Urinal at bedside - incontinent   Last BM: 9/17/23, bedpan   Activity: Refused repositioning or offers to get OOB      Skin: Buttocks denuded but improving.   Left heel dressing intact. States it was changed on night shift.    Pain: LLE/heel   CMS:  Neuro: X: neuropathy.   A&O x4   Dressing: LLE heel   Diet: Regular with thin liquids. Pt has snacks in room; typically orders dinner from outside    LDA: N/a    Equipment: Liko   Plan: Con't POC.    Additional Info: Pt. Up in recliner via liko lift.   Linens changed

## 2023-09-17 NOTE — PLAN OF CARE
Pt is alert and oriented x4. Able to make needs known to staff. Incontinent of bowel, uses urinal for bladder.  Assist of two with liko lift. Pt denies SOB,CP and N/V. Dressing change was done on left heel. PRN  TRAMADOL 50 mg was given per pt request.No acute issue during shift. Will continue plan of care.            Patient's most recent vital signs are:     Vital signs:  BP: 120/68  Temp: 95.7  HR: 64  RR: 16  SpO2: 98 %     Patient does not have new respiratory symptoms.  Patient does not have new sore throat.  Patient does not have a fever greater than 99.5.

## 2023-09-18 NOTE — PLAN OF CARE
Goal Outcome Evaluation:  Patient is alert and oriented, able to make needs known to staff, takes medication whole with thin liquids, eats regular diet. Patient was seen sleeping comfortably during this shift. Took scheduled tylenol at 6:45 am with thin liquid. Call light within reach, no acute issue noted at this time, will continue with poc.            Patient's most recent vital signs are:     Vital signs:  BP: 108/71  Temp: 97.6  HR: 62  RR: 16  SpO2: 99 %     Patient does not have new respiratory symptoms.  Patient does not have new sore throat.  Patient does not have a fever greater than 99.5.

## 2023-09-18 NOTE — PLAN OF CARE
Patient is alert and oriented x4. Able to make needs known to staff. Patient is mixed incontinence. Voids spontaneously without difficulty. Transfers assist-2 w/ liko lift. Patient denied Chest pain, SOB, new onset cough and N&V. Diet: Regular diet with fair appetite. Dressing to left foot wound site is CDI. Call-light within reach. Continue with POC.    Vital signs: T: 97.7,  B/P: 121/60,  P: 71,  R: 16,  SpO2: 95 %     Patient does not have new respiratory symptoms.  Patient does not have new sore throat.  Patient does not have a fever greater than 99.5.

## 2023-09-18 NOTE — PLAN OF CARE
Pt is alert and oriented x4. Able to make needs known to staff. Incontinent of bowel, uses urinal for bladder.  Assist of two with liko lift. Pt denies SOB,CP and N/V. No acute issue during shift. Will continue plan of care.            Patient's most recent vital signs are:     Vital signs:  BP: 108/71  Temp: 97.6  HR: 62  RR: 16  SpO2: 99 %     Patient does not have new respiratory symptoms.  Patient does not have new sore throat.  Patient does not have a fever greater than 99.5.

## 2023-09-19 NOTE — PLAN OF CARE
Goal Outcome Evaluation:      Plan of Care Reviewed With: patient, sibling, family    Overall Patient Progress: no change    Outcome Evaluation: Patient with continued poor appetite and intakes. Eating some outside food but not ordering any food from the facility. Encouraged pt to try supplements from facility and from outside. Encouraged family to bring some food for pt. Sent trial of multiple supplements for pt.

## 2023-09-19 NOTE — PLAN OF CARE
Goal Outcome Evaluation:    Patient is alert and oriented, able to make needs known to staff, takes medication whole with thin liquids, eats regular diet. Patient was seen sleeping most time  during this shift. Refused his schedule tylenol after was approached 2x. Denied chest pain, SOB, and N/V. Call light within reach, no acute issue noted at this time, will continue with poc       Patient's most recent vital signs are:     Vital signs:  BP: 121/60  Temp: 97.7  HR: 71  RR: 16  SpO2: 95 %     Patient does not have new respiratory symptoms.  Patient does not have new sore throat.  Patient does not have a fever greater than 99.5.

## 2023-09-19 NOTE — CARE PLAN
RN: Pt unable to roll side to side or boost self up. Assist of 2 to boost up in bed.  Pt seems very weak, states mornings are like this. Visitors today at 1030. Declines skin check this morning.   Up in chair at shift end 1500. Contact precautions maintained.      Patient's most recent vital signs are:     Vital signs:  BP: 117/63  Temp: 97.3  HR: 75  RR: 16  SpO2: 97 %     Patient does not have new respiratory symptoms.  Patient does not have new sore throat.  Patient does not have a fever greater than 99.5.

## 2023-09-19 NOTE — PROGRESS NOTES
[facility] Transitional [facility] Unit  [facility] Nurse Inpatient Assessment     Consulted for: Left heel, buttock excoration    Patient History (according to provider note(s):      Per Mariana Sorto NP on 8/28/2023: Waldo Bustos is a 72 year old man with a history of type 2 DM, HLD, chronic back pain, JAIR, DVT/PE on DOAC, prior L BELLA eventually treated with two stage revision w/ explant in 11/2022 and second stage in 5/2023, RA, and venous insufficiency admitted from TCU to [facility] on 6/17 with encephalopathy and MRSA bacteremia.  Underwent I&D with Dr. Meyers on 6/21/23.  Currently on prolonged oral antibiotic course.  He is transferred back to TCU on 8/28 for PT and OT.       Assessment:      Areas visualized during today's visit: Lower extremities     Pressure Injury Location: Left heel      9/13  Last photo: 9/19    Wound type: Pressure Injury     Pressure Injury Stage: Unstageable, hospital acquired, assessed with SANDRA Cook on 7/28  Wound history/plan of care:  Pt known to [facility] service. Pt known to decline cares and repositioning.   7/19 initial assessment: Spent approx 20 mins on negotiating how to move leg to be able to minimally assess wound. Pt agreed to see picture of wound. This writer explained wound to pt using the photo. Discussed with patient if he would like legs elevated on pillows or boots. He agreed to pillows.    7/20: Assessed wound with Kristen Hadley RN CWOCN. Confirmed thick slough/eschar wound base. Again discussed keeping heels off bed with pt.  7/25/2023: Of note, patient has refused and continues to refuse heel off-loading boots on assessment today.  7/28: pt had heel lift boots in place  Wound base: 100 % slough     Palpation of the wound bed: normal      Drainage: scant     Description of drainage: serosanguinous     Measurements (length x width x depth, in cm) 2.3 x 1.1 x 0.3 cm     Tunneling N/A     Undermining N/A  Periwound skin: Intact      Color: pink       Temperature: normal   Odor: none  Pain: severe, shooting and stabbing  Pain intervention prior to dressing change: slow and gentle cares   Treatment goal: Infection control/prevention and soften nonviable tissue  STATUS: evolving now with slough base   Supplies ordered: discussed with RN and discussed with patient     9/13: heel on bed- pillows conpressed    Wound location: left buttock and gluteal cleft    Last photo: 9/19  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)  Wound history/plan of care: pt is incontinent and has previously refused assessment of area by WOC  Wound base: 90 % epidermis 10% dermis     Palpation of the wound bed: normal      Drainage: none     Description of drainage: none     Measurements (length x width x depth, in cm): see photo, breakdown in cleft and on left buttock      Tunneling: N/A     Undermining: N/A  Periwound skin: Intact and Erythema- blanchable      Color: red      Temperature: normal   Odor: none  Pain: moderate, sharp- mostly related to left hip pain  Pain interventions prior to dressing change: slow and gentle cares   Treatment goal: Maintain (prevention of deterioration) and Protection  STATUS: stable  Supplies ordered: at bedside, discussed with RN, and discussed with patient      9/13: pt is refusing antifungal powder and barrier paste  9/19: pt refusing chux and antifungal powder. Paste was allowed today.        Treatment Plan:     Bilateral buttocks and gluteal cleft wounds: BID and PRN with incontinence episodes  Cleanse wound with dry wipes and martell cleanse and protect spray.   Apply light layer of Triad paste (#264431) to wounds and red skin on bilateral buttocks  With repeat application, do not scrub the paste, only remove soiled paste and reapply.  If complete removal of paste is necessary use baby oil/mineral oil (#844488) and soft wash cloth.  Ensure pt has Isaac-cushion (#958687) while sitting up in the chair.  Use only one Covidien pad in between  mattress and pt. No brief in bed.    Left heel wound(s): Every other day   Strongly encourage pt to elevate heels on pillows to float off bed surface at all times. (He agreed to this with WOC)  Cleanse wound with wound cleanser.   Apply light layer of Triad paste (#831466) to wound bed  Secure with mepilex.  If patient agrees to heel off-loading boots, please apply.    Orders: Reviewed    RECOMMEND PRIMARY TEAM ORDER: None, at this time  Education provided: importance of repositioning, plan of care, wound progress and Infection prevention   Discussed plan of care with: Patient and Nurse  WOC nurse follow-up plan: weekly, stable POC  Notify WOC if wound(s) deteriorate.  Nursing to notify the Provider(s) and re-consult the WO Nurse if new skin concern.    DATA:     Current support surface: Bariatric Low air loss (BIN pump, Isolibrium, Pulsate, skin guard, etc)  Containment of urine/stool: Incontinence Protocol  BMI: Body mass index is 30.49 kg/m .   Active diet order: Orders Placed This Encounter      Regular Diet Adult     Output: I/O last 3 completed shifts:  In: -   Out: 700 [Urine:700]     Labs:   Recent Labs   Lab 09/18/23  0732   HGB 8.3*   WBC 10.0       Pressure injury risk assessment:   Sensory Perception: 3-->slightly limited  Moisture: 3-->occasionally moist  Activity: 2-->chairfast  Mobility: 2-->very limited  Nutrition: 2-->probably inadequate  Friction and Shear: 1-->problem  Rafael Score: 13    Mona Horton RN  CWOCN  Pager no longer is use, please contact through [employer] group: [facility] Nurse [facility]  Dept. Office Number: [phone]

## 2023-09-19 NOTE — PROGRESS NOTES
[facility]  Orthopaedic Surgery, Joint Replacement Consultation  by Rom Meyers M.D.     Waldo Bustos MRN# [id number]     YOB: 1950      Requesting physician: No ref. provider found  Jv Felix, PCP  Rogelio Shi MD [facility]  Jem Hickman MD [facility], [place], [place]      Background history:  DX:  Morbid obesity Body mass index is 45.2 kg/m .  Diabetes mellitus  Left BELLA prosthetic joint infection  Hx of PE. Long term anticoag     TREATMENTS:  3/7/2018, left two incision total hip arthroplasty ([facility])  1/23/2019, revision left total hip arthroplasty ([facility]) [facility]. IV Ceftriaxone x 4 weeks for C Acnes  2/2021, L hip aspiration (CDI)  4/6/2022, C Acnes  4/27/2022, drain placement. C Acnes.  11/14/2022 L hip aspiration 3+ Proteus mirabilis , 4+ Porphyromonas sp.  11/22/2022, explantation L BELLA, non-articulating PMMA spacer ([facility]) [facility]. Proteus mirabilis, Finegoldia magna, zosyn x 1 wk > po cipro/po metronidazole x 6 weeks from 11/22/22.  5/26/2023, rule antibiotic spacer with revision left total hip arthroplasty, Dr. Meyers, [facility]  6/21/2023, left hip arthrotomy with irrigation and excisional debridement of skin, subcutaneous tissue, fascia, muscle and hip joint with aspiration, Dr. Meyers, [facility] (C acnes, MRSA, Pseudomonas)     HPI:   Rahat is a 73-year-old male who presents today status post the above-mentioned procedures.  He is currently in a transitional care unit.  He is on ciprofloxacin 5 mg twice daily and doxycycline 100 mg twice daily per infectious disease guidelines.  He has been partial weightbearing on the left hip.  He has been nonweightbearing on the left foot secondary to a pressure ulcer.  He is currently being treated by wound ostomy nurse for the pressure ulcer.    Examination:  EXAMINATION pertinent findings:   PSYCH: Pleasant, healthy-appearing, alert, oriented x3, cooperative. Normal mood and affect.  VITAL SIGNS: not currently  breastfeeding.  Reviewed nursing intake notes.   There is no height or weight on file to calculate BMI.  RESP: non labored breathing   ABD: benign, soft, non-tender, no acute peritoneal findings  SKIN: grossly normal   LYMPHATIC: grossly normal, no adenopathy, no extremity edema  NEURO: grossly normal , no motor deficits  VASCULAR: satisfactory perfusion of all extremities   MUSCULOSKELETAL:      Left hip: The incision is clean, dry, and intact with no erythema, dehiscence, or discharge.     Left foot:        DATA:     XR left hip on 9/25/2023:  My interpretation: Status post revision left total hip arthroplasty.  Adequate sizing, fixation and orientation of components.  Callus ration noted along extended trochanteric osteotomy.  No signs of immediate postoperative complications.     6/21/2023 Intra-Op cultures:    Anaerobic Bacterial Culture Routine  Order: [id number]  Collected 6/21/2023  3:39 PM       Status: Final result       Visible to patient: Yes (not seen)    Specimen Information: Hip, Left; Tissue   1 Result Note  Culture 1+ Cutibacterium (Propionibacterium) acnes Abnormal         Tissue Aerobic Bacterial Culture Routine  Order: [id number]  Collected 6/21/2023  3:39 PM       Status: Final result       Visible to patient: Yes (not seen)    Specimen Information: Hip, Left; Tissue   0 Result Notes  Culture   1+ Staphylococcus aureus Abnormal    Susceptibilities done on previous cultures   1+ Pseudomonas aeruginosa Abnormal       1+ Staphylococcus aureus MRSA Abnormal                 Impression:  73-year-old male with history of prosthetic joint infection who is status post explantation of antibiotic spacer with revision left total hip arthroplasty    Left foot pressure ulcer    Plan:  Progress to weightbearing as tolerated on the left hip  Antibiotics per infectious disease  Continue wound care on left heel pressure ulcer per wound ostomy nurse.  Weightbearing status for left foot per wound nurse.  Incision is  completely healed so we can get it wet or submerse  Discontinue posterior precautions  Follow-up with Dr. Rahman in 6 to 8 weeks when repeat x-rays to be taken    Curt Mariano PA-C  Physician Assistant   Oncology and Adult Reconstructive Surgery  Dept Orthopaedic Surgery, [facility]    This note was created using dictation software and may contain errors.  Please contact the creator for any clarifications that are needed.

## 2023-09-19 NOTE — PROGRESS NOTES
CLINICAL NUTRITION SERVICES - REASSESSMENT NOTE     Nutrition Prescription    RECOMMENDATIONS FOR MDs/PROVIDERS TO ORDER:  Encourage intakes, please obtain new weight as able    Malnutrition Status:    Severe malnutrition in the context of chronic illness    Recommendations already ordered by Registered Dietitian (RD):  Encouraged intakes, food from home  Sending trials of supplements    Future/Additional Recommendations:  Monitor labs, intakes, and weight trends.     EVALUATION OF THE PROGRESS TOWARD GOALS   Diet: Regular  Intake/Tolernace: 0 - 50% of documented meals over last week.    Pt ordered no food from the facility this week.     NEW FINDINGS/REVIEW OF SYSTEMS    Nutrition/GI: RD met with pt, pt's brother and pt's sister-in-law at bedside. Discussed concerns for pt poor appetite and intakes. Discussed importance of intakes and protein for wound healing. Offered to send trials of protein shakes, magic cup and special K bars for pt. Encouraged food from outside. Made suggestions on other supplement options not available in the hospital.     Weights: Pt with 46 lb (18%) weight loss over 3 months, 111 lb (34%) weight loss in < 1 year.  No new weight since 9/9  Wt Readings from Last Encounters:   09/09/23 96.4 kg (212 lb 8.4 oz)   08/23/23 98.7 kg (217 lb 9.5 oz)   06/16/23 115.2 kg (254 lb)   06/06/23 117.4 kg (258 lb 13.1 oz)   05/26/23 110.4 kg (243 lb 4.8 oz)   05/19/23 110.4 kg (243 lb 4.8 oz)   03/23/23 117 kg (257 lb 14.4 oz)   01/09/23 119.7 kg (264 lb)   11/22/22 147.1 kg (324 lb 4.8 oz)   11/14/22 142.9 kg (315 lb)   11/09/22 146.8 kg (323 lb 9.6 oz)   05/19/22 136.1 kg (300 lb)   07/03/18 148.9 kg (328 lb 4.8 oz)     Skin: Heel pressure injury     Labs reviewed   09/04/23 08:47 09/11/23 06:37 09/18/23 07:32   Sodium 139 140 141   Potassium 4.2 3.8 3.9   Urea Nitrogen 34.7 (H) 32.1 (H) 31.5 (H)   Creatinine 2.22 (H) 1.95 (H) 1.88 (H)   Glucose 102 (H) 94 101 (H)     Medications reviewed: Cipro,  doxycycline hyclate, ferrous sulfate every other day, folic acid, culturell, multivitamin, thiamine, vitamin D3      MALNUTRITION  % Intake: < 75% for >/= 3 months (moderate)  % Weight Loss: >7.5% in 3 months (severe)  and >20% in 1 year (severe)   Subcutaneous Fat Loss: Facial/Jaw Region moderate and Upper Arm moderate-severe  Muscle Loss: Global moderate-severe  Fluid Accumulation/Edema: Trace   Malnutrition Diagnosis: Severe malnutrition in the context of  chronic illness    Previous Goals   Patient to consume % of nutritionally adequate meal trays TID, or the equivalent with supplements/snacks.  Evaluation: Not met    Previous Nutrition Diagnosis  Inadequate oral intake related to poor appetite, pt dislike of hospital food as evidenced by pt report, documented intakes   Evaluation: No change    CURRENT NUTRITION DIAGNOSIS  Inadequate oral intake related to poor appetite, pt dislike of hospital food as evidenced by pt report, documented intakes     INTERVENTIONS  Implementation  Nutrition education for nutrition relationship to health/disease   Medical food supplement therapy - sending trials of supplements    Goals  Patient to consume % of nutritionally adequate meal trays TID, or the equivalent with supplements/snacks.    Monitoring/Evaluation  Progress toward goals will be monitored and evaluated per protocol.    Dee Alanis MS, RDN, LD  RD pager: [phone]  [facility] Weekend/Holiday Pager: [phone]

## 2023-09-20 PROBLEM — N18.31 CHRONIC KIDNEY DISEASE, STAGE 3A (H): Status: ACTIVE | Noted: 2023-01-01

## 2023-09-20 NOTE — PROGRESS NOTES
ZEB sent referral to [facility] (44 facilities).    NEREYDA Sharp   [facility], Transitional Care Unit   Social Work   [facility] [place] [address]  () [phone]

## 2023-09-20 NOTE — PLAN OF CARE
Goal Outcome Evaluation:      Plan of Care Reviewed With: patient    Overall Patient Progress: no change     Pt A&Ox4 today, intermittent confusion. Pt coccyx/sacrum excoriated, cracked, peeling, WOC following. Contact precautions maintained. Pt refuses CPAP overnight. Urinal in reach. Pt had x3 BM this shift, uses bedpan. Poor diet, mostly eats food from outside the facility when he eats. Pt takes pills whole. Pt helps with turns, but Ax2 for cares, boosts, and Liko lift. Pt has call light in reach, can make needs known, and has no unanswered questions or concerns at end of shift. Continue POC.

## 2023-09-21 NOTE — PROGRESS NOTES
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 Was transferred to  TCU where he completed IV abx course, became profoundly deconditioned but refused to work with therapies, and ultimately underwent reimplant of hip 5/2023. Most recently readmitted to hospital 6/17-8/28/23 w/ encephalopathy and MRSA bacteremia, underwent left hip I&D on 6/21/23. [facility]; however, discharged from [facility] due to refusal to participate.       # MRSA bacteremia, suspected 2/2 left hip PJI and associated soft tissue infection  # Left hip recurrent PJI s/p debridement, antibiotics and implant retention (DAIR), 6/21/23  # Physical deconditioning  Most recently readmitted to hospital w/ MRSA bacteremia (positive blood cultures 6/14-6/17, cleared since 6/19 and KEILA was w/o IE). There was purulent drainage at incision site and CT concerning for infection. Synovial fluid aspiration 6/17 grew MRSA, corynebacterium, klebsiella. The bacteremia was suspected d/t recurrent left hip PJI and associated soft tissue infection (nidus potentially the indwelling drain). Went for debridement and implant retention on 6/21 w/ Dr. Meyers. Intra op cultures w/ MRSA and pseudomonas. ID following. Last CRP (9/18) normal. As above, pt has struggled w/ recurrent left hip PJIs since 2018, explanted 11/2022, second stage 5/2023 following aspiration w/ negative cultures prior to hardware reinsertion. Followed in hospital by ID and started on PO Cipro and doxycycline on 8/2. At most recent OP ID clinic  visit 9/20 with Dr. Mercer, antibiotics discontinued as he has now completed 12 weeks of therapy. Also completed 3 month posterior hip precautions on 9/21. Pt states L hip pain currently at baseline 7/10. Afebrile and no c/f recurrent infection.    - Continue to monitor pt clinically now off abx's and obtain BCs if s/s of infection develop.  - Follow up in Orthopedic clinic as scheduled on 9/25.   - Patient continues to be depressed and refuse therapies. Therefore, PT/OT stopped working with pt and discharged him 9/14, and 9/13 respectively.   - Also declined Psychology, Psychiatry, Palliative Care consults. SW continues working on LTC placement.      # Left foot and ankle pain  # Left unstageable hospital-acquired heel pressure ulcer, POA  Likely secondary to heel ulcer. XR left ankle 8/29 w/ osteopenia, no fractures, mild degenerative changes.   - WOCN following heel  - Pain mgmt: Patient resistant to pain regimen despite ongoing complaints of pain that limits his ability to participate in therapy, not willing to try different medications or adjust current regimen.   - Continue scheduled Tylenol TID and prn, as well as prn tramadol and Robaxin.     # Chronic neck pain:  MRI 8/13 w/ multilevel cervical degenerative changes, greatest at C6-7. Gabapentin was trialed inpatient but stopped d/t somnolence and patient has not been open to retrial at lower dose. Denies any worsening of neck pain currently.   - CTM     # Adjustment disorder with depressed mood and passive SI:  Has been admitted here or hospital since 11/2022. Is severely depressed w/ frequent passive suicidal statements - states he has no plan.   - Continues to refuse psychology/psychiatry involvement.      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 Peaked at ~6 on 7/25/23. HALEY attributed to significant hemodynamic changes including diuresis and and on vancomycin previously. HD line placed inpatient but ultimately did not require dialysis. IR removed tunneled  line on 9/5. Most recent Cr improved to 1.88 on 9/18.   - BMP qM   - Needs outpatient Nephrology follow-up, patient not currently interested in clinic visit, can revisit PRN    # Nocturnal urinary incontinence: Likely due to BPH  - Continue Flomax 0.4 mg at bedtime      Other Stable Medical Issues:  # Severe malnutrition: In acute on chronic illness. Continues with poor intake. RD following.  # Chronic normocytic anemia: Hgb stable mid 7-8s. CTM via CBC qM.  # HTN: BPs at goal. Continue amlodipine and carvedilol.   # Hx of VTE: On lifelong anticoagulation. Continue apixaban.   # JAIR: Continue home CPAP.  # HLD: Continue statin       Diet: Regular Diet Adult    DVT Prophylaxis: DOAC  Tran Catheter: Not present  Lines: None     Cardiac Monitoring: None  Code Status: Full Code      Gilson Stanton PA-C  Internal Medicine Hospitalist   [facility]  [phone]          Consults:   None (PT/OT initially consulted but signed off due to pt not participating).          Discharge Planning:   TBD; Pt agreed to go to SNF on 9/22, but after discussing with his girlfriend, he no longer desires to go to that facility. SW continues to work on placement.         Interval History:   NAEO. Pain stable. Denies other acute physical concerns at this time.          Physical Exam:   Vitals were reviewed  Blood pressure 104/50, pulse 68, temperature 97.8  F (36.6  C), temperature source Oral, resp. rate 16, weight 94.2 kg (207 lb 9.6 oz), SpO2 99 %.  GEN: In NAD  HEENT: NCAT; PERRL; sclerae non-icteric  LUNGS: CTAB  CV: RRR  ABD: +BSs; SNTND  EXT: No BLE edema  SKIN: No acute rashes noted on exposed areas.  NEURO: AAOx3; CNs grossly intact; No acute focal deficits noted.        ROUTINE LABS:  CMP  Recent Labs   Lab 09/21/23  0608 09/18/23  0732   NA  --  141   POTASSIUM  --  3.9   CHLORIDE  --  110*   CO2  --  22   ANIONGAP  --  9   GLC 93 101*   BUN  --  31.5*   CR  --  1.88*   GFRESTIMATED  --  37*   MAIKOL  --  8.3*      CBC  Recent Labs   Lab 09/18/23  0732   WBC 10.0   RBC 2.70*   HGB 8.3*   HCT 25.4*   MCV 94   MCH 30.7   MCHC 32.7   RDW 15.9*            Gilson Stanton PA-C  Internal Medicine Hospitalist   [facility]  [phone]

## 2023-09-21 NOTE — PLAN OF CARE
Pt is alert and oriented x4. Able to make needs known to staff. Incontinent of bowel, uses urinal for bladder.  Assist of two with liko lift. Pt denies SOB,CP and N/V. Pt refuse schedule Tylenol. No acute issue during shift. Will continue plan of care.             Patient's most recent vital signs are:     Vital signs:  BP: 131/66  Temp: 97.2  HR: 71  RR: 16  SpO2: 95 %     Patient does not have new respiratory symptoms.  Patient does not have new sore throat.  Patient does not have a fever greater than 99.5.

## 2023-09-21 NOTE — PLAN OF CARE
Goal Outcome Evaluation:      Plan of Care Reviewed With: patient    Overall Patient Progress: no changeOverall Patient Progress: no change    Patient slept well through the night. Is alert and oriented, able to use a call light and make his needs known. Regular diet, thin liquids, takes pills whole. Denies pain, SOB, N/V on this shift. Call light reach within reach and Nursing staff will continue to monitor.

## 2023-09-21 NOTE — PLAN OF CARE
"  Problem: Plan of Care - These are the overarching goals to be used throughout the patient stay.    Goal: Plan of Care Review  Description: The Plan of Care Review/Shift note should be completed every shift.  The Outcome Evaluation is a brief statement about your assessment that the patient is improving, declining, or no change.  This information will be displayed automatically on your shift note.  Outcome: Progressing  Flowsheets (Taken 9/20/2023 2121)  Plan of Care Reviewed With: patient  Overall Patient Progress: no change  Goal: Patient-Specific Goal (Individualized)  Description: You can add care plan individualizations to a care plan. Examples of Individualization might be:  \"Parent requests to be called daily at 9am for status\", \"I have a hard time hearing out of my right ear\", or \"Do not touch me to wake me up as it startles me\".  Outcome: Progressing  Goal: Absence of Hospital-Acquired Illness or Injury  Outcome: Progressing  Intervention: Prevent Skin Injury  Recent Flowsheet Documentation  Taken 9/20/2023 2000 by Ray Morfin RN  Body Position:   left   turned  Goal: Optimal Comfort and Wellbeing  Outcome: Progressing  Goal: Readiness for Transition of Care  Outcome: Progressing     Problem: Individualization  Goal: Patient Preferences  Outcome: Progressing     Problem: TCU Patient Goals  Goal: TCU Plan of Care  Outcome: Progressing  Flowsheets (Taken 9/20/2023 2121)  Bowel:   incontinent   continent  Bladder:   incontinent   continent  Patient is on a psychotropic medication, compazine and/or reglan: no  Patient is on an anti-anxiety and /or anti-depressant medication: no  Patient is on a hypnotic and/or sleep aid medication: no  Teaching Goals:   medication management   wound care   pressure ulcer prevention  Skin Integrity:   repositioning   activity promotion  Antibiotics: yes     Problem: Goal Outcome Summary  Goal: Goal Outcome Summary  Outcome: Progressing     Problem: Hip Arthroplasty  Goal: " Optimal Functional Ability  Description: Implement multidisciplinary rehabilitation following early mobility guidelines; coordinate pain control to optimize comfort with activity.    Identify functional limitations, such as ADL (activities of daily living), mobility safety and independence; encourage optimal participation to minimize decline associated with inactivity.    Facilitate functional mobility, such as bed mobility, transfers and ambulation; progress and retrain as tolerated.    Encourage ADL (activities of daily living), such as self-feeding, hygiene and dressing; provide set-up, adaptations, assistance and extra time as needed.    Promote a safe and accessible environment and effective use of assistive devices and equipment.    Pace and cluster activity to balance with rest periods and conserve energy; promote adequate nutrition, sleep and rest.    Evaluate and address performance deficits, such as cognitive, balance and activity tolerance impairments.    Facilitate range of motion and prescribed exercises, such as isometric gluteal and quadriceps exercise.    Outcome: Progressing  Intervention: Promote Optimal Functional Status  Recent Flowsheet Documentation  Taken 9/20/2023 2000 by Ray Morfin RN  Assistive Device Utilized: lift device  Activity Management: activity adjusted per tolerance     Problem: Diabetes Comorbidity  Goal: Blood Glucose Level Within Targeted Range  Description: Establish target blood glucose levels based on patient-specific factors, such as age, diabetes-related complications and illness severity.    Document blood glucose levels and monitor trend; advocate for adjustment to keep within targeted range.    Provide pharmacologic therapy to maintain blood glucose levels within targeted range.    Check blood glucose level if there is a change in mental or cognitive status.    Outcome: Progressing     Problem: Pain Chronic (Persistent) (Comorbidity Management)  Goal: Acceptable Pain  Control and Functional Ability  Description: Evaluate pain level, effect of treatment and patient response at regular intervals.    Minimize pain stimuli; coordinate care and adjust environment (e.g., light, noise, unnecessary movement); promote sleep/rest.    Match pharmacologic analgesia to severity and type of pain mechanism (e.g., neuropathic, muscle, inflammatory); consider multimodal approach (e.g., nonopioid, opioid, adjuvant).    Provide medication at regular intervals; titrate to patient response.    Manage breakthrough pain with additional doses; consider rotation or switching medication    Outcome: Progressing     Problem: Obstructive Sleep Apnea Risk or Actual  Goal: Unobstructed Breathing During Sleep  Description: Use a validated screening tool to identify risk for JAIR (obstructive sleep apnea).    Implement continuous pulse oximetry to detect apnea-related oxygen desaturation events.    Encourage a nonsupine sleep position to prevent airway collapse, such as side-lying or head of bed elevated.    Minimize use of sedative, opioid and benzodiazepine medication that may contribute to respiratory depression.    Provide oxygen therapy during sleep to reduce hypoxemia.    Continue use of home prescribed continuous PAP (positive airway pressure) during sleep; utilize home device and settings if available.    Implement continuous PAP during sleep for high-risk of JAIR; consider auto-titrating PAP, humidification and mask choice (including fit and style) to improve comfort and tolerance.    Provide behavioral and supportive interventions individualized to the patient needs and preferences to improve PAP adherence.    Facilitate polysomnography (sleep study) referral if needed.  Outcome: Progressing     Problem: Urinary Incontinence  Goal: Effective Urinary Elimination  Description: Identify cause and contributing factors, such as disease process, medication or treatment side effects, infection or dietary  bladder irritants; provide treatment.    Assess for general symptoms, such as fatigue, depression, weakness, confusion, sensory alterations that could impact toileting.    Provide safe and ready access to toileting devices and aids (e.g., commode, urinal).    Promote behavioral methods, such as prompted toileting, elimination schedule, relaxation techniques or voiding posture to facilitate flow.    Consider pelvic floor muscle strengthening, such as pelvic floor muscle training, vaginal cone, bladder support or neuromodulation.    Ensure bladder emptying (e.g., intermittent catheterization, portable bladder ultrasound after voiding).    Outcome: Progressing     Problem: Bowel Elimination Management  Goal: Effective Bowel Elimination/Continence  Description: Develop and implement a bowel program individualized to type of bowel dysfunction, patient needs and abilities.     Incorporate positioning, natural reflexes or use of physical interventions, such as abdominal massage, digital stimulation, anal irrigation    Encourage regular timing and schedule for elimination.    Encourage fluid intake and intake of dietary fiber, such as whole grains, fruits and vegetables.    Facilitate management of hygiene and cleaning following evacuation and episodes of bowel incontinence.      Outcome: Progressing     Problem: Infection  Goal: Absence of Infection Signs and Symptoms  Description: Implement transmission-based precautions and isolation, as indicated, to prevent spread of infection.    Obtain cultures prior to initiating antimicrobial therapy, when possible. Do not delay treatment for laboratory results in the presence of high suspicion or clinical indicators.    Administer ordered antimicrobial therapy promptly; reassess need regularly.    Monitor laboratory value, diagnostic test and clinical status trends for signs of infection progression.    Identify early signs of sepsis, such as increased heart rate and decreased  blood pressure, as well as changes in mental state, respiratory pattern or peripheral perfusion.      Outcome: Progressing  Intervention: Prevent or Manage Infection  Recent Flowsheet Documentation  Taken 9/20/2023 2000 by Ray Morfin RN  Isolation Precautions: contact precautions maintained     Problem: BADL (Basic Activities of Daily Living) Impairment  Goal: Optimal Safe BADL Performance  Description: Assess BADL (basic activity of daily living) abilities; encourage participation at maximally safe independent level.    Provide assistance and supervision needed to maintain safety; involve caregiver in BADL (basic activity of daily living) training.    Ensure effective use of equipment or devices, such as a long-handled reacher, shower seat or orthosis.    Ensure proper body mechanics and positioning for optimal task performance.    Provide set-up of items if patient is unable to retrieve; store personal care items in accessible location.    Schedule BADL (basic activity of daily living) activities when pain and fatigue are at a minimum; pace activity to conserve energy.      Outcome: Progressing     Problem: Skin Injury Risk Increased  Goal: Skin Health and Integrity  Description: Perform a full pressure injury risk assessment, as indicated by screening, upon admission to care unit.    Reassess skin (full inspection and injury risk, including skin temperature, consistency and color) frequently (e.g., scheduled interval, with change in condition) to provide optimal early detection and prevention.    Maintain adequate tissue perfusion (e.g., encourage fluid balance; avoid crossing legs, constrictive clothing or devices) to promote tissue oxygenation.    Maintain head of bed at lowest degree of elevation tolerated, considering medical condition and other restrictions. Use positioning supports to prevent sliding and friction. Consider low friction textiles.    Avoid positioning onto an area that remains reddened or  on bony prominences.      Outcome: Progressing  Intervention: Optimize Skin Protection  Recent Flowsheet Documentation  Taken 9/20/2023 2000 by Ray Morfin RN  Activity Management: activity adjusted per tolerance  Head of Bed (HOB) Positioning: HOB at 15 degrees     Problem: Malnutrition  Goal: Improved Nutritional Intake  Description: Perform a nutrition assessment; include a nutrition-focused physical exam.    Determine calorie, protein, vitamin, mineral and fluid requirements.    Assess for micronutrient deficiencies; supplement if depleted.    Assess need and assist with meal set-up and feeding.    Adjust diet or meal schedule based on preferences and tolerance.    Minimize unnecessary dietary restrictions to increase oral intake.    Outcome: Progressing     Problem: Depressive Signs/Symptoms  Goal: Improved Mood Symptoms (Depressive Signs/Symptoms)  Description: Provide opportunity for patient and family/caregiver to express feelings, stressors and self-perception (e.g., verbalization, journaling).    Convey caring approach, empathy and potential for change; hope is key for recovery.    Utilize existing coping strategies and assist in developing new strategies, such as relaxation, music and problem-solving; assess for ineffective strategies (e.g., substance use, isolation).    Recognize past and present achievements, successes and personal strengths.    Empower participation in planning care and activities, as well as development of attainable goals, to increase self-esteem and feelings of control.    Promote self-care; support balanced sleep, physical activity and nutrition    Outcome: Progressing     Problem: Fall Injury Risk  Goal: Absence of Fall and Fall-Related Injury  Description: Provide a safe, barrier-free environment that encourages independent activity.    Keep care area uncluttered and well-lighted.    Determine need for increased observation or monitoring.    Avoid use of devices that minimize  mobility, such as restraints or indwelling urinary catheter.      Outcome: Progressing     Problem: Cognitive Impairment  Goal: Optimal Cognitive Function  Description: Assess cognitive function using a standardized tool to establish baseline and identify areas of deficit.    Establish rapport and trust; utilize unhurried and calm approach.    Utilize clear communication; address patient by name, speak slowly with simple directions and gestures; provide time for patient response, listen carefully and maintain eye contact.    Provide environmental adaptations to support cognitive function and safety; consider sensory exposure, hearing ability and distractions.    Outcome: Progressing   Goal Outcome Evaluation:      Plan of Care Reviewed With: patient    Overall Patient Progress: no changeOverall Patient Progress: no change     Patient alert & oriented. Irritable with staff at times with certain cares, frustrated. Poor appetite, but this shift did order personal pizza from kitchen and ate snacks and entire pizza. Refused Micantin powder to groin, Interdry applied to area for moisture control. 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 Pain to left leg and foot, states the heel and top of foot are most painful. Dressing to heel intact and due to be changed tomorrow. Tramadol PRN for pain, scheduled Tylenol. CPAP to be used at night but patient declined. Discharge pending nursing facility placement.

## 2023-09-21 NOTE — PROGRESS NOTES
9/20 ZEB sent referral to all [facility].  (44 facilities)  9/21 ZEB got a phone call -from Dasha/Ade.  [phone]  she said they have openings for pt.  She wanted to know what area.  ZEB said he is from [place] but [facility]. She also asked if his infections were active or colonized.  ZEB said she didn't know but will find out and get back to her. ZEB asked RN but she will have ask ID and get back to SW.  ZEB emailed Dasha and said we are working on answer.     ZEB called Dasha and said the doctors aren't sure.  But they think maybe he is colonized. She will get back to me.        NEREYDA Sharp   [facility], Transitional Care Unit   Social Work   [facility] 4th [place] [address]  (PH) [phone]

## 2023-09-22 NOTE — PROGRESS NOTES
"A&Ox4. VSS on RA. Refused Carvedilol and Culturell this shift. Continent bladder, urinal at bedside. Incontinent of bowel at times, BM this AM. Poor appetite, regular diet, pt ate 25% of dinner. Refused repositioning throughout shift. Pt complained of \"pain all over\", when offered pain medication, pt stated \"I deserve to be in pain\", reinformed pt that options are available to manage pain and emotional support provided. Pt withdrawn. Able to make needs known. Continue with plan of care.    Vital signs:  Temp: 97.9  F (36.6  C) Temp src: Oral BP: 113/66 Pulse: 78   Resp: 16 SpO2: 100 % O2 Device: None (Room air)         "

## 2023-09-22 NOTE — PLAN OF CARE
"Goal Outcome Evaluation:  Pt.A&Ox4. Awake initial rounds - positioned almost perpendicular in bed. Writer stated several times to allow assistance with repositioning and each time, pt.responded with a marley \"no\". Told him to call when he's ready for help. Provided ice water, emptied urinal and call light within reach. Subsequent rounds, pt.remains in same position and sleeping/snoring.    0555 - Pt.refused scheduled Tylenol and Lactobacillus this AM. Now laying perpendicular in bed w/head partially hanging off EOB and feet hanging over EOB on the other side. Writer insisted this time we are repositioning and pt.agreed after writer stated we'll move just a little. While moving BLEs slightly pt.yelled \"just leave me alone!\" Writer replied \"no, not until we move your upper body now\". Pt.did allow minor repositioning though crabby throughout.         Patient's most recent vital signs are:     Vital signs:  BP: 113/66  Temp: 97.9  HR: 78  RR: 16  SpO2: 100 %     Patient does not have new respiratory symptoms.  Patient does not have new sore throat.  Patient does not have a fever greater than 99.5.                               "

## 2023-09-22 NOTE — PLAN OF CARE
Problem: Pain Chronic (Persistent) (Comorbidity Management)  Goal: Acceptable Pain Control and Functional Ability  Description: Evaluate pain level, effect of treatment and patient response at regular intervals.    Minimize pain stimuli; coordinate care and adjust environment (e.g., light, noise, unnecessary movement); promote sleep/rest.    Match pharmacologic analgesia to severity and type of pain mechanism (e.g., neuropathic, muscle, inflammatory); consider multimodal approach (e.g., nonopioid, opioid, adjuvant).    Provide medication at regular intervals; titrate to patient response.    Manage breakthrough pain with additional doses; consider rotation or switching medication    Outcome: Not Progressing   Goal Outcome Evaluation:       VSS, Verbalized pain generalized and L heel but declined prn pain meds. Encouraged food and fluid intake. Seen and examined by MIRANDA Mancuso- to start flomax. Encourage activity and transfer to recliner but declined. Able to do pericares, tried paste applied to buttocks after a lot of encouragement. Buttocks still red/excoriation but improving. BM large soft/loose. Wound care done to L heel.  To be discharge to a SNF on Monday.    Will continue plan of care.  Vital signs:  Temp: 98.1  F (36.7  C) Temp src: Axillary BP: 121/65 Pulse: 79   Resp: 16 SpO2: 97 % O2 Device: None (Room air)

## 2023-09-22 NOTE — PROGRESS NOTES
ZEB called Dasha/Ade.  We talked about pt's infection.  Dasha talked to co-workers about it and they are OFFERING a shared bed at [facility].  We set discharge for Monday leaving at 11 am. ZEB will talk with pt and family.     SW met with pt.  Pt is okay going to SNF.  Pt was okay with sharing a room. SW told him Monday at 11 am.      ZEB emailed both Nephew and brother about discharge.  SW asked brother if he could come and get belongings, anytime from Sunday to Tuesday. ZEB will set up ride on Sunday.    ZEB called Dasha and said we are a go for Monday at 11 am.     SW was called in pt room. Pt said his girlfriend said it was not a good place.  Pt said he is not going.  SW said to have Ivan call SW.      ZEB did leave a message for [facility] [place]. [phone].      NEREYDA Sharp   [facility] Transitional Care Unit   Social Work   [facility] [place] [address]  (PH) [phone]

## 2023-09-23 NOTE — PLAN OF CARE
Goal Outcome Evaluation:      Plan of Care Reviewed With: patient    Overall Patient Progress: no changeOverall Patient Progress: no change    Outcome Evaluation: Patient out of bed and in wheelchair. Assisted in helping patient set up pedaling aparatus to use independently. Acetaminophen prn given for generalized pain.

## 2023-09-23 NOTE — PLAN OF CARE
Pt is alert and oriented x4. Able to make needs known to staff. Incontinent of bowel, uses urinal for bladder.  Assist of two with liko lift. Pt denies SOB,CP and N/V. will be discharge on Monday. No acute issue during shift. Will continue plan of care.            Patient's most recent vital signs are:     Vital signs:  BP: 120/63  Temp: 98.6  HR: 83  RR: 18  SpO2: 97 %     Patient does not have new respiratory symptoms.  Patient does not have new sore throat.  Patient does not have a fever greater than 99.5.

## 2023-09-23 NOTE — PLAN OF CARE
Goal Outcome Evaluation:  Overall Pt had no acute issue this shift. Pt spent entire shift sleeping on and off. Urinal at bedside this shift. Denied having discomfort this shift. All safety measures in place. Will continue with POC.       Patient's most recent vital signs are:     Vital signs:  BP: 120/63  Temp: 98.6  HR: 83  RR: 18  SpO2: 97 %     Patient does not have new respiratory symptoms.  Patient does not have new sore throat.  Patient does not have a fever greater than 99.5.

## 2023-09-23 NOTE — PLAN OF CARE
Goal Outcome Evaluation:       VSS. Alert and orientedx4. Stayed in bed, encourage to get out in the recliner but declined. Pain to L heel but declined prn tramadol.   Did not eat breakfast  Dressing to L heel CDI.  Discharge on Monday to SNF but per charge pt does not want to go to that place anymore.  1410: sponge bath pt with soap and water, caridad cares done, triad paste to excoriated buttocks. Transfer to recliner and been eating lunch. Afternoon meds refused but change mind and will take it after late lunch- might change mind again.  Will continue plan of care.  Vital signs:  Temp: 97.8  F (36.6  C) Temp src: Oral BP: 124/62 Pulse: 68   Resp: 16 SpO2: 99 % O2 Device: None (Room air)

## 2023-09-24 NOTE — PROGRESS NOTES
ZEB received two vm's from [facility].  The do not have any beds available.     ZEB called brother/Ivan. Ivan said Pt is willing to move now.  Ivan will be here later today to  belongings. SW said he could move again to either another SNF or [facility].  Ivan said [facility] would be good.  SW will met with pt.  But SW can call [facility] tomorrow to request EW.      SW met with pt.  Pt said he is not going. ZEB said Ivan is coming today to get your stuff. We talked about how hard it is to find any placement.  ZEB said it could be temporary, as SW will call [facility] tomorrow.  ZEB said [facility] doesn't have a bed. His girlfriend, whom has left the house in years is telling him not to go.   ZEB asked leadership to go talk to him in am.   SW set up HE for 10:40 to 11:30 am.     HE called back.  Pt has an othro appt staff didn't tell SW and SW didn't see.  We set discharge for Tuesday instead.    ZEB called [facility] and told them Tuesday instead.    ZEB emailed Dasha and told her Tuesday.   ZEB communicated with leadership and let them know.     NEREYDA Sharp   [facility], Transitional Care Unit   Social Work   [facility] [place] [address]  () [phone]

## 2023-09-24 NOTE — PLAN OF CARE
Plan of Care Reviewed With: patient    Overall Patient Progress: improvingOverall Patient Progress: improving     Outcome Evaluation: A&O x4, makes needs known. Pt got out of bed and in wheel chair, organized his room. Pt denies c/p, sob, n/v, LBM 9/23/23

## 2023-09-24 NOTE — PLAN OF CARE
Goal Outcome Evaluation:      Plan of Care Reviewed With: patient    Overall Patient Progress: no change    Patient is alert and oriented x4. Able to make needs known to staff. Patient is mixed incontinence. Transfers assist-2 w/ liko lift. Patient denied Chest pain, SOB, new onset cough and N&V. Diet: Regular diet with fair appetite. Declined skin assessment at LLE wound site. Stated area was too tender. Wound dressing is CDI. Pain managed with PRN tramadol x1 which was effective. Continues to refuse Flomax. Stating he told care team he doesn't want it anymore. Declined lidocaine patch. Call-light within reach. Continue with POC.    Vital signs: T: 97.7,  B/P: 120/68,  P: 83,  R: 16,  SpO2: 98 %     Patient does not have new respiratory symptoms.  Patient does not have new sore throat.  Patient does not have a fever greater than 99.5.

## 2023-09-25 NOTE — PLAN OF CARE
Goal Outcome Evaluation:    Patient is alert and oriented. Able to make needs known. Continent of bowel. Incontinent of bladder x1 during shift, care done. Mepilex on sacral area replaced. Assisted with bed repositioning. Sleeping in between cares. Comfortable in bed during rounds. Call light within reach. Scheduled ortho appointment today, stretcher  at 1145H, returning around 1345H.    Overall Patient Progress: no change    Outcome Evaluation: No significant changes on patient's condition during shift.     Patient's most recent vital signs are:     Vital signs:  BP: 131/65  Temp: 97.7  HR: 77  RR: 16  SpO2: 98 %     Patient does not have new respiratory symptoms.  Patient does not have new sore throat.  Patient does not have a fever greater than 99.5.

## 2023-09-25 NOTE — PROGRESS NOTES
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 Discharged to  TCU and completed IV abx course, became profoundly deconditioned but refused to work with therapy, and ultimately underwent reimplant of hip 5/2023. Most recently readmitted to hospital 6/17-8/28/23 w/ encephalopathy and MRSA bacteremia, underwent left hip I&D 6/21/23. [facility] back [facility] 8/28 for ongoing rehabilitation; however, discharged from [facility] due to refusal to participate.       Disposition issues: Per chart review, patient was to discharge 9/25 but refused. On 9/25, patient stated he will not discharge unless  TCU can say with 100% certainty that discharge to [facility] will be temporary. Writer discussed that TCU team cannot make promises about what happens after discharge, and patient states he will not leave TCU then  - SW closely following and working on discharge planning. Patient aware that next possible discharge option may take a very long time to find  - Of note, has declined psychology, psychiatry, palliative care involvement     MRSA bacteremia likely 2/2 L hip PJI and associated soft tissue infection, L hip recurrent PJI s/p debridement, antibiotics and implant retention (DAIR) 6/21/23: Readmitted to hospital w/ MRSA bacteremia 6/17 to 8/28, see notes 9/24 and prior for details. Issues with recurrent PIJs since 2018. Explanted 11/2022, second stage 5/2023 following aspiration w/ negative cultures prior to hardware reinsertion. Abx stopped 9/20 per Dr. Mercer  - Follow up with ortho 9/25 as planned  - Monitor for s/s recurrent  infection    L foot and ankle pain, L unstageable hospital-acquired heel pressure ulcer, POA: Likely 2/2 heel ulcer. XR L ankle 8/29 w/ osteopenia, no fractures, mild degenerative changes. WOCN following   - Continue scheduled Tylenol, tramadol and Robaxin. No changes to be made at this time     Adjustment disorder with depressed mood and passive SI: Has been admitted here or hospital since 11/2022. Is severely depressed w/ frequent passive suicidal statements - states he has no plan.   - Continues to refuse psychology/psychiatry involvement     HALEY on CKD I-II:  Baseline Cr 0.8-1. Peaked at ~6 7/25/23, etiology thought 2/2 significant hemodynamic changes including diuresis and and vancomycin. HD line placed inpatient but ultimately did not require dialysis. IR removed tunneled line on 9/5. Cr slowly improving, 1.7 9/25  - Weekly BMP   - Needs outpatient Nephrology follow-up, patient not currently interested in clinic visit, can revisit PRN      Other Medical Issues:  Physical deconditioning: In the setting of the above. Discharged from PT, OT 9/14 due to unwillingness to participate  Severe malnutrition: In acute on chronic illness. Continues with poor intake. RD following  Chronic normocytic anemia: BL hgb 7-8 and stable  Chronic neck pain: MRI 8/13 multilevel cervical degenerative changes, greatest at C6-7. Gabapentin was trialed inpatient but stopped d/t somnolence and patient has not been open to retrial at lower dose. Monitor  HTN: BPs stable. Continue amlodipine and carvedilol  H/o VTE: On lifelong anticoagulation. Continue Apixaban  JAIR: Continue home CPAP  HLD: Continue statin  Nocturnal urinary incontinence: Likely due to BPH. Continue Flomax    The above was discuss with patient and attending physician, Dr. Esquivel who agrees with the above    CODE: Full Code  DVT: Apixaban   Diet: Regular diet  Indications for Psychotropic Medications: Tramadol at lowest effective dose for pain management. Melatonin at  lowest effective dose for sleep  Disposition: Pending, patient refusing discharge plan as above    Mae Moseley PA-C  Hospitalist Service  Pager: [phone]  ________________________________________________________________    Subjective & Interval History:    Frustrated by staff coming into his room this am. No new pain. No fever or chills. States he will only discharge this week if the TCU team cannot guarantee that it will be temporary. Declines further d/w writer.     Last 24 hour care team notes reviewed.   ROS: 4 point ROS (including Respiratory, CV, GI and ) was performed and negative unless otherwise noted in HPI.     Medications: Reviewed in EPIC.    Physical Exam:    General Appearance: Alert and oriented x3, appears comfortable  HEENT: Anicteric sclera, MMM   Respiratory: Breathing comfortably on room air, lungs CTAB without wheezing or crackles   Cardiovascular: RRR, S1, S2. No murmur noted  GI: Abdomen soft, non-tender with active bowel sounds. No guarding or rebound  Lymph/Hematologic: Trace BLE peripheral edema, distal pulses palpable   Skin: No rash or jaundice   Musculoskeletal: Moves all extremities   Neurologic: No focal deficits, CN II-XII grossly intact      Lines/Tubes/Drains:

## 2023-09-25 NOTE — DISCHARGE SUMMARY
[facility]  Hospitalist Discharge Summary      Date of Admission: 8/28/2023  Date of Discharge: 9/26/2023  Discharging Provider: Mae Moseley PA-C/Dr. Markham  Discharge Service: Hospitalist Service    Discharge Diagnoses   Resolved MRSA bacteremia likely 2/2 L hip PJI and associated soft tissue infection  L hip recurrent PJI s/p debridement, antibiotics and implant retention (DAIR) 6/21/23  L foot and ankle pain  L unstageable hospital-acquired heel pressure ulcer, POA  Adjustment disorder with depressed mood and intermittent passive SI  Resolving HALEY on CKD I-II  Physical deconditioning  Severe malnutrition  Chronic normocytic anemia  Chronic neck pain  HTN  H/o VTE  JAIR  HLD  Nocturnal urinary incontinence    Clinically Significant Risk Factors     # Severe Malnutrition: based on nutrition assessment      Follow-ups Needed After Discharge   - Follow up with PCP within 1 week: CBC, CMP  - Follow up with orthopedics in 6-8 weeks. Appointment needs to be set up  - Follow up with ID as needed  - Needs outpatient Nephrology follow-up. Patient declined to have this set up while at TCU, so he will need to follow up after discharge    Unresulted Labs Ordered in the Past 30 Days of this Admission       No orders found from 7/29/2023 to 8/29/2023.          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 Discharged to  TCU and completed IV abx course, became profoundly deconditioned but refused to work with therapy, and ultimately underwent reimplant of hip 5/2023. Most recently readmitted to hospital 6/17-8/28/23 w/ encephalopathy  and MRSA bacteremia, underwent left hip I&D 6/21/23. Discharged back to  TCU 8/28 for ongoing rehabilitation; however, discharged from therapies due to refusal to participate.       Resolved MRSA bacteremia likely 2/2 L hip PJI and associated soft tissue infection, L hip recurrent PJI s/p debridement, antibiotics and implant retention (DAIR) 6/21/23: Readmitted to hospital w/ MRSA bacteremia 6/17 to 8/28, see notes 9/24 and prior for details. Issues with recurrent PIJs since 2018. Explanted 11/2022, second stage 5/2023 following aspiration w/ negative cultures prior to hardware reinsertion. Abx stopped 9/20 per Dr. Mercer. Saw Curt Mariano PA-C of orthopedics 9/25, no changes made at that time     L foot and ankle pain, L unstageable hospital-acquired heel pressure ulcer, POA: Likely 2/2 heel ulcer. XR L ankle 8/29 w/ osteopenia, no fractures, mild degenerative changes. WOCN following   - Continue scheduled Tylenol, tramadol and Robaxin on discharge      Adjustment disorder with depressed mood and intermittent passive SI: Has been admitted here or hospital since 11/2022. Is severely depressed w/ frequent passive suicidal statements - states he has no plan. Refused psychology/psychiatry involvement this admission. Continue close OP follow up with PCP     HALEY on CKD I-II:  Baseline Cr 0.8-1. Peaked at ~6 7/25/23, etiology thought 2/2 significant hemodynamic changes including diuresis and and vancomycin. HD line placed inpatient but ultimately did not require dialysis. IR removed tunneled line on 9/5. Cr slowly improving at time of admission at was 1.7 9/25  - Needs outpatient Nephrology follow-up. Patient declined to have this set up while at TCU, so he will need to follow up after discharge   - BMP with PCP within 1 week        Other Medical Issues:  Physical deconditioning: In the setting of the above. Discharged from PT, OT 9/14 due to unwillingness to participate  Severe malnutrition: In acute on chronic  illness. Continues with poor intake. RD followed this admission. Continue to encourage oral intake on discharge  Chronic normocytic anemia: BL hgb 7-8 and stable this admission  Chronic neck pain: MRI 8/13 multilevel cervical degenerative changes, greatest at C6-7. Gabapentin was trialed inpatient but stopped d/t somnolence and patient has not been open to retrial at lower dose. follow up with PCP as above  HTN: BPs stable. Continue amlodipine and carvedilol on discharge  H/o VTE: On lifelong anticoagulation. Continue Apixaban on discharge  JAIR: Continue home CPAP on discharge  HLD: Continue statin on discharge   Nocturnal urinary incontinence: Likely due to BPH. Continue Flomax on discharge     Consultations This Hospital Stay   PT, OT, WOCN, IR    Code Status   Full Code    Time Spent on this Encounter   IMae PA-C, personally saw the patient today and spent greater than 30 minutes discharging this patient.       Mae Moseley PA-C  [facility] [address] [address]  Phone: [phone]  ______________________________________________________________________    Physical Exam   Vital Signs: Temp: (!) 95.9  F (35.5  C) Temp src: Oral BP: 116/64 Pulse: 79   Resp: 16 SpO2: 99 % O2 Device: None (Room air)    Weight: 207 lbs 9.6 oz  General Appearance: Alert and oriented x3, appears comfortable  HEENT: Anicteric sclera, MMM   Respiratory: Breathing comfortably on room air, lungs CTAB without wheezing or crackles   Cardiovascular: RRR, S1, S2. No murmur noted  GI: Abdomen soft, non-tender with active bowel sounds. No guarding or rebound  Lymph/Hematologic: Trace BLE peripheral edema, distal pulses palpable   Skin: No rash or jaundice   Musculoskeletal: Moves all extremities   Neurologic: No focal deficits, CN II-XII grossly intact       Primary Care Physician   YUE MEDLEY    Discharge Orders      Wound Care Referral       Adult Nephrology [facility] Referral      General info for SNF    Length of Stay Estimate: Long Term Care  Condition at Discharge: Stable  Level of care:skilled   Rehabilitation Potential: Fair  Admission H&P remains valid and up-to-date: Yes  Recent Chemotherapy: N/A  Use Nursing Home Standing Orders: Yes     Mantoux instructions    Give two-step Mantoux (PPD) Per Facility Policy Yes     Activity - Up with nursing assistance     Wound care    Left heel wound(s): Every other day   Strongly encourage pt to elevate heels on pillows to float off bed surface at all times. (He agreed to this with WOC)  Cleanse wound with wound cleanser.   Apply light layer of Triad paste (#512798) to wound bed  Cut piece of adaptic to cover paste and wound  Secure with mepilex.  If patient agrees to heel off-loading boots, please apply.     Additional Discharge Instructions    Call or return if you develop fever >100.4, confusion, altered mental status, new or worsening left hip or leg pain/swelling/redness/drainage, increased weakness, increased depression or thoughts of self harm, uncontrolled abdominal pain/nausea/vomiting, chest pain, racing heart rate or palpitations, shortness of breath, trouble breathing, or any other symptom of concern to you.     Wound care (specify)    Bilateral buttocks and gluteal cleft wounds: BID and PRN with incontinence episodes  Cleanse wound with dry wipes and martell cleanse and protect spray.   Apply light layer of Triad paste (#923582) to wounds and red skin on bilateral buttocks  With repeat application, do not scrub the paste, only remove soiled paste and reapply.  If complete removal of paste is necessary use baby oil/mineral oil (#746390) and soft wash cloth.  Ensure pt has Isaac-cushion (#510271) while sitting up in the chair.  Use only one Covidien pad in between mattress and pt. No brief in bed.     Reason for your hospital stay    You were initially admitted to [facility] 11/2022 for explant of  prosthesis due to L hip joint infection. You were stabilized and discharged to the [place] [facility]. You have been back and forth from the TCU since that time. You were stable off antibiotics prior to discharge to your long term care facility. You were discharged from physical therapy and occupational therapy prior due to lack of interest in participation. You discharged to long term care when a bed became available.     Follow Up and recommended labs and tests    Follow up with primary care provider, YUE MEDLEY, within 7 days for hospital follow- up. The following labs/tests are recommended: CBC, CMP.      Follow up with orthopedics in 6-8 weeks. This appointment needs to be set up.    Follow up with infectious disease as needed.    Follow up with nephrology (kidney) at next available appointment    Follow up with wound nurse as indicted     Appointments on [facility] and/or [facility] (with [facility] or [facility] provider or service). Call [phone] if you haven't heard regarding these appointments within 7 days of discharge.     Full Code     Contact Isolation     Fall precautions     Diet    Follow this diet upon discharge: Orders Placed This Encounter      Snacks/Supplements Adult: Other; Please allow pt/RN to order snacks/supplements PRN; Between Meals      Regular Diet Adult       Significant Results and Procedures   Most Recent 3 CBC's:  Recent Labs   Lab Test 09/25/23  0645 09/18/23  0732 09/11/23  0637   WBC 9.3 10.0 9.8   HGB 8.3* 8.3* 8.4*   MCV 95 94 94    284 307     Most Recent 3 BMP's:  Recent Labs   Lab Test 09/25/23  0645 09/21/23  0608 09/18/23  0732 09/11/23  0637     --  141 140   POTASSIUM 3.8  --  3.9 3.8   CHLORIDE 110*  --  110* 108*   CO2 22  --  22 21*   BUN 27.1*  --  31.5* 32.1*   CR 1.70*  --  1.88* 1.95*   ANIONGAP 9  --  9 11   MAIKOL 8.4*  --  8.3* 8.8   * 93 101* 94     Most Recent 2 LFT's:  Recent Labs   Lab Test 08/23/23  1124 08/21/23  1043   AST 19 17   ALT 15  15   ALKPHOS 73 74   BILITOTAL 0.3 0.3     Most Recent 3 INR's:  Recent Labs   Lab Test 08/01/23  1022   INR 1.34*       Discharge Medications   Current Discharge Medication List        START taking these medications    Details   apixaban ANTICOAGULANT (ELIQUIS) 5 MG tablet Take 1 tablet (5 mg) by mouth 2 times daily  Qty: 60 tablet, Refills: 0    Associated Diagnoses: History of pulmonary embolism      calcium carbonate (TUMS) 500 MG chewable tablet Take 1 tablet (500 mg) by mouth 4 times daily as needed for heartburn  Qty: 100 tablet, Refills: 0    Associated Diagnoses: Gastroesophageal reflux disease without esophagitis      !! melatonin 3 MG tablet Take 1 tablet (3 mg) by mouth At Bedtime  Qty: 30 tablet, Refills: 0    Associated Diagnoses: Primary insomnia      nitroGLYcerin (NITROSTAT) 0.4 MG sublingual tablet For chest pain place 1 tablet under the tongue every 5 minutes for 3 doses. If symptoms persist 5 minutes after 1st dose call 911.  Qty: 30 tablet, Refills: 0    Associated Diagnoses: Other forms of angina pectoris      tamsulosin (FLOMAX) 0.4 MG capsule Take 1 capsule (0.4 mg) by mouth daily  Qty: 30 capsule, Refills: 0    Associated Diagnoses: Benign prostatic hyperplasia with nocturia       !! - Potential duplicate medications found. Please discuss with provider.        CONTINUE these medications which have CHANGED    Details   acetaminophen (TYLENOL) 325 MG tablet Take 3 tablets (975 mg) by mouth 3 times daily  Qty: 300 tablet, Refills: 0    Associated Diagnoses: Prosthetic joint infection, initial encounter (H24)      amLODIPine (NORVASC) 10 MG tablet Take 1 tablet (10 mg) by mouth daily  Qty: 30 tablet, Refills: 0    Associated Diagnoses: Essential hypertension      atorvastatin (LIPITOR) 40 MG tablet Take 1 tablet (40 mg) by mouth daily  Qty: 30 tablet, Refills: 0    Associated Diagnoses: Dyslipidemia      carvedilol (COREG) 6.25 MG tablet Take 1 tablet (6.25 mg) by mouth 2 times daily (with  meals)  Qty: 60 tablet, Refills: 0    Associated Diagnoses: Essential hypertension      diclofenac (VOLTAREN) 1 % topical gel Apply 2 g topically 4 times daily  Qty: 300 g, Refills: 0    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, unspecified laterality, sequela      docusate sodium (COLACE) 100 MG capsule Take 1 capsule (100 mg) by mouth 2 times daily as needed for constipation  Qty: 60 capsule, Refills: 0    Associated Diagnoses: Slow transit constipation      ferrous sulfate (FEROSUL) 325 (65 Fe) MG tablet Take 1 tablet (325 mg) by mouth every other day  Qty: 15 tablet, Refills: 0    Associated Diagnoses: Acute blood loss anemia      folic acid (FOLVITE) 1 MG tablet Take 1 tablet (1 mg) by mouth daily  Qty: 30 tablet, Refills: 0    Associated Diagnoses: Acute blood loss anemia      lactobacillus rhamnosus, GG, (CULTURELL) capsule Take 1 capsule by mouth 2 times daily  Qty: 60 capsule, Refills: 0    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      Lidocaine (LIDOCARE) 4 % Patch Place 1 patch onto the skin every 24 hours To prevent lidocaine toxicity, patient should be patch free for 12 hrs daily.  Qty: 30 patch, Refills: 0    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, unspecified laterality, sequela      methocarbamol (ROBAXIN) 500 MG tablet Take 1 tablet (500 mg) by mouth every 6 hours as needed for muscle spasms  Qty: 30 tablet, Refills: 0    Associated Diagnoses: Infection of prosthetic joint, subsequent encounter      miconazole (MICATIN) 2 % external powder Apply topically 2 times daily  Qty: 50 g, Refills: 0    Associated Diagnoses: Acute blood loss anemia; Failure of total hip arthroplasty, unspecified laterality, sequela      multivitamin w/minerals (THERA-VIT-M) tablet Take 1 tablet by mouth daily  Qty: 30 tablet, Refills: 0    Associated Diagnoses: Acute blood loss anemia      polyethylene glycol (MIRALAX) 17 GM/Dose powder Take 17 g by mouth daily as needed  Qty: 510  g, Refills: 0    Associated Diagnoses: Prosthetic joint infection, initial encounter (H24)      senna-docusate (SENOKOT-S/PERICOLACE) 8.6-50 MG tablet Take 1 tablet by mouth 2 times daily as needed for constipation  Qty: 30 tablet, Refills: 0    Associated Diagnoses: Infection of prosthetic joint, subsequent encounter      thiamine (B-1) 100 MG tablet Take 1 tablet (100 mg) by mouth daily  Qty: 30 tablet, Refills: 0    Associated Diagnoses: Acute blood loss anemia      traMADol (ULTRAM) 50 MG tablet Take 0.5-1 tablets (25-50 mg) by mouth every 6 hours as needed for severe pain  Qty: 12 tablet, Refills: 0    Associated Diagnoses: Prosthetic joint infection, initial encounter (H24)      Vitamin D3 (CHOLECALCIFEROL) 25 mcg (1000 units) tablet Take 2 tablets (50 mcg) by mouth daily  Qty: 60 tablet, Refills: 0    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, unspecified laterality, sequela           CONTINUE these medications which have NOT CHANGED    Details   !! melatonin 3 MG tablet Take 1 tablet (3 mg) by mouth At Bedtime    Associated Diagnoses: Physical deconditioning       !! - Potential duplicate medications found. Please discuss with provider.        STOP taking these medications       ciprofloxacin (CIPRO) 500 MG tablet Comments:   Reason for Stopping:         doxycycline hyclate (VIBRAMYCIN) 100 MG capsule Comments:   Reason for Stopping:         lidocaine (LMX4) 4 % external cream Comments:   Reason for Stopping:             Allergies   Allergies   Allergen Reactions    Sulfa Antibiotics      Other reaction(s): *Unknown - Childhood Rxn    Tizanidine Other (See Comments)     Frequent urination; causes drowsiness, and dry mouth

## 2023-09-25 NOTE — PLAN OF CARE
Goal Outcome Evaluation:      Plan of Care Reviewed With: patient    Overall Patient Progress: no change    Outcome Evaluation: No acute issues with pt this shift    Pt is A/O x 4, calm and cooperative with cares. VSS, denies no SOB, Cp, N/V. On regular diet with fair appetite, mixed continence. Assist x2 with liko lift for transfers. LLE heel wound dressing CDI, Declined lidocaine patch. Went for an ortho appointment at 1145. Able to make needs known. Continue POC.    Patient's most recent vital signs are:     Vital signs:  BP: 116/64  Temp: 95.9  HR: 79  RR: 16  SpO2: 99 %     Patient does not have new respiratory symptoms.  Patient does not have new sore throat.  Patient does not have a fever greater than 99.5.

## 2023-09-25 NOTE — PHARMACY-MEDICATION REGIMEN REVIEW
Pharmacy Medication Regimen Review  Waldo Bustos is a 73 year old male who is currently in the Transitional Care Unit.    Assessment: All medications have an appropriate indications, durations and no unnecessary use was found    Plan:   Continue medication regimen    Attending provider will be sent this note for review.  If there are any emergent issues noted above, pharmacist will contact provider directly by phone.      Pharmacy will periodically review the resident's medication regimen for any PRN medications not administered in > 72 hours and discontinue them. The pharmacist will discuss gradual dose reductions of psychopharmacologic medications with interdisciplinary team on a regular basis.    Please contact pharmacy if the above does not answer specific medication questions/concerns.    Background:  A pharmacist has reviewed all medications and pertinent medical history today.  Medications were reviewed for appropriate use and any irregularities found are listed with recommendations.      Current Facility-Administered Medications:     - Skin Test Reading -, , Does not apply, Q21 Days, Sasha Posada PA, Read at 09/21/23 0829    acetaminophen (TYLENOL) tablet 650 mg, 650 mg, Oral, Q4H PRN, Sasha Posada PA, 650 mg at 09/23/23 1657    acetaminophen (TYLENOL) tablet 975 mg, 975 mg, Oral, TID, Mariana Sorto CNP, 975 mg at 09/25/23 0607    amLODIPine (NORVASC) tablet 10 mg, 10 mg, Oral, Daily, Mariana Sorto CNP, 10 mg at 09/25/23 1126    apixaban ANTICOAGULANT (ELIQUIS) tablet 5 mg, 5 mg, Oral, BID, Mariana Sorto CNP, 5 mg at 09/25/23 0810    atorvastatin (LIPITOR) tablet 40 mg, 40 mg, Oral, Daily, Sasha Posada PA, 40 mg at 09/25/23 1126    benzocaine-menthol (CHLORASEPTIC) 6-10 MG lozenge 1 lozenge, 1 lozenge, Buccal, Q2H PRN, Sasha Posada PA    calcium carbonate (TUMS) chewable tablet 1,000 mg, 1,000 mg, Oral, 4x Daily PRN, Sasha Posada  PA    carvedilol (COREG) tablet 6.25 mg, 6.25 mg, Oral, BID w/meals, Macario, Mariana Machuca, CNP, 6.25 mg at 09/25/23 0810    diclofenac (VOLTAREN) 1 % topical gel 2 g, 2 g, Topical, 4x Daily PRN, Liliya Lai PA, 2 g at 09/02/23 2217    docusate sodium (COLACE) capsule 100 mg, 100 mg, Oral, BID PRN, Liliya Lai PA    ferrous sulfate (FEROSUL) tablet 325 mg, 325 mg, Oral, Every Other Day, Sasha Posada PA, 325 mg at 09/25/23 1126    folic acid (FOLVITE) tablet 1 mg, 1 mg, Oral, Daily, Sasha Posada PA, 1 mg at 09/25/23 1126    lactobacillus rhamnosus (GG) (CULTURELL) capsule 1 capsule, 1 capsule, Oral, BID, Sasha Posada PA, 1 capsule at 09/25/23 0607    Lidocaine (LIDOCARE) 4 % Patch 1 patch, 1 patch, Transdermal, Q24H, Sasha Posada PA, 1 patch at 09/23/23 1022    lidocaine (LMX4) cream, , Topical, Q1H PRN, Sasha Posada PA    melatonin tablet 3 mg, 3 mg, Oral, At Bedtime, Sasha Posada PA, 3 mg at 09/24/23 2039    methocarbamol (ROBAXIN) tablet 500 mg, 500 mg, Oral, Q6H PRN, Sasha Posada PA, 500 mg at 09/23/23 2134    miconazole (MICATIN) 2 % powder, , Topical, BID, Sasha Posada PA, Given at 09/17/23 2106    mineral oil-white petrolatum (EUCERIN/MINERIN) cream, , Topical, Q1H PRN, Sasha Posada PA    multivitamin w/minerals (THERA-VIT-M) tablet 1 tablet, 1 tablet, Oral, Daily, Sasha Posada PA, 1 tablet at 09/25/23 1126    naloxone (NARCAN) injection 0.2 mg, 0.2 mg, Intravenous, Q2 Min PRN **OR** naloxone (NARCAN) injection 0.4 mg, 0.4 mg, Intravenous, Q2 Min PRN **OR** naloxone (NARCAN) injection 0.2 mg, 0.2 mg, Intramuscular, Q2 Min PRN **OR** naloxone (NARCAN) injection 0.4 mg, 0.4 mg, Intramuscular, Q2 Min PRN, Juliana Dillon MD    nitroGLYcerin (NITROSTAT) sublingual tablet 0.4 mg, 0.4 mg, Sublingual, Q5 Min PRN, Sasha Posada PA    Nurse may request from Pharmacy a change of form of medication (e.g. Liquid to tablet)., , Does  not apply, Continuous PRN, Sasha Posada PA    ondansetron (ZOFRAN ODT) ODT tab 4 mg, 4 mg, Oral, Q6H PRN **OR** ondansetron (ZOFRAN) injection 4 mg, 4 mg, Intravenous, Q6H PRN, Sasha Posada PA    polyethylene glycol (MIRALAX) Packet 17 g, 17 g, Oral, Daily PRN, Liliya Lai PA    senna-docusate (SENOKOT-S/PERICOLACE) 8.6-50 MG per tablet 1 tablet, 1 tablet, Oral, At Bedtime PRN, Liliya Lai PA    tamsulosin (FLOMAX) capsule 0.4 mg, 0.4 mg, Oral, Daily, Bartolome Stanton PA-C, 0.4 mg at 09/25/23 0810    thiamine (B-1) tablet 100 mg, 100 mg, Oral, Daily, Sasha Posada PA, 100 mg at 09/25/23 1126    traMADol (ULTRAM) half-tab 25-50 mg, 25-50 mg, Oral, Q6H PRN, Sasha Posada PA, 50 mg at 09/24/23 0909    Vitamin D3 (CHOLECALCIFEROL) tablet 50 mcg, 50 mcg, Oral, Daily, Sasha Posada PA, 50 mcg at 09/25/23 1127  No current outpatient prescriptions on file.   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

## 2023-09-25 NOTE — LETTER
9/25/2023         RE: Waldo Bustos  [address]  [facility] [address]        Dear Colleague,    Thank you for referring your patient, Waldo Bustos, to the [facility] [place]. Please see a copy of my visit note below.       [facility] Physicians  Orthopaedic Surgery, Joint Replacement Consultation  by Rom Meyers M.D.     Waldo Bustos MRN# [id number]     YOB: 1950      Requesting physician: No ref. provider found  Jv Felix, PCP  Rogelio Shi MD [facility]  Vick, MD Rama Parish Albero, ID      Background history:  DX:  Morbid obesity Body mass index is 45.2 kg/m .  Diabetes mellitus  Left BELLA prosthetic joint infection  Hx of PE. Long term anticoag     TREATMENTS:  3[facility]7/2018[facility]  1/23/2019, revision left total hip arthroplasty ([facility]. IV Ceftriaxone x 4 weeks for C Acnes  2/2021, L hip aspiration (JACKELIN)  4/6/2022, C Acnes  4/27/2022, drain placement. C Acnes.  11/14/2022 L hip aspiration 3+ Proteus mirabilis , 4+ Porphyromonas sp.  11/22/2022, explantation L BELLA, non-articulating PMMA spacer (Luigi) [facility]. Proteus mirabilis, Finegoldia magna, zosyn x 1 wk > po cipro/po metronidazole x 6 weeks from 11/22/22.  5/26/2023, rule antibiotic spacer with revision left total hip arthroplasty, Dr. Meyers, [facility]  6/21/2023, left hip arthrotomy with irrigation and excisional debridement of skin, subcutaneous tissue, fascia, muscle and hip joint with aspiration, Dr. Meyers [facility] (C acnes, MRSA, Pseudomonas)     HPI:   Rahat is a 73-year-old male who presents today status post the above-mentioned procedures.  He is currently in a transitional care unit.  He is on ciprofloxacin 5 mg twice daily and doxycycline 100 mg twice daily per infectious disease guidelines.  He has been partial weightbearing on the left hip.  He has been nonweightbearing on the left foot secondary to a pressure  ulcer.  He is currently being treated by wound ostomy nurse for the pressure ulcer.    Examination:  EXAMINATION pertinent findings:   PSYCH: Pleasant, healthy-appearing, alert, oriented x3, cooperative. Normal mood and affect.  VITAL SIGNS: not currently breastfeeding.  Reviewed nursing intake notes.   There is no height or weight on file to calculate BMI.  RESP: non labored breathing   ABD: benign, soft, non-tender, no acute peritoneal findings  SKIN: grossly normal   LYMPHATIC: grossly normal, no adenopathy, no extremity edema  NEURO: grossly normal , no motor deficits  VASCULAR: satisfactory perfusion of all extremities   MUSCULOSKELETAL:      Left hip: The incision is clean, dry, and intact with no erythema, dehiscence, or discharge.     Left foot:        DATA:     XR left hip on 9/25/2023:  My interpretation: Status post revision left total hip arthroplasty.  Adequate sizing, fixation and orientation of components.  Callus ration noted along extended trochanteric osteotomy.  No signs of immediate postoperative complications.     6/21/2023 Intra-Op cultures:    Anaerobic Bacterial Culture Routine  Order: [id number]  Collected 6/21/2023  3:39 PM       Status: Final result       Visible to patient: Yes (not seen)    Specimen Information: Hip, Left; Tissue   1 Result Note  Culture 1+ Cutibacterium (Propionibacterium) acnes Abnormal         Tissue Aerobic Bacterial Culture Routine  Order: [id number]  Collected 6/21/2023  3:39 PM       Status: Final result       Visible to patient: Yes (not seen)    Specimen Information: Hip, Left; Tissue   0 Result Notes  Culture   1+ Staphylococcus aureus Abnormal    Susceptibilities done on previous cultures   1+ Pseudomonas aeruginosa Abnormal       1+ Staphylococcus aureus MRSA Abnormal                 Impression:  73-year-old male with history of prosthetic joint infection who is status post explantation of antibiotic spacer with revision left total hip arthroplasty    Left foot  pressure ulcer    Plan:  Progress to weightbearing as tolerated on the left hip  Antibiotics per infectious disease  Continue wound care on left heel pressure ulcer per wound ostomy nurse.  Weightbearing status for left foot per wound nurse.  Incision is completely healed so we can get it wet or submerse  Discontinue posterior precautions  Follow-up with Dr. Rahman in 6 to 8 weeks when repeat x-rays to be taken    Curt Mariano PA-C  Physician Assistant   Oncology and Adult Reconstructive Surgery  Dept Orthopaedic Surgery, [facility]    This note was created using dictation software and may contain errors.  Please contact the creator for any clarifications that are needed.

## 2023-09-25 NOTE — PLAN OF CARE
Goal Outcome Evaluation:       A&OX 4. Takes pill whole with thin liquid. Able to make needs known. Wound dressing to coccyx and lower knee changed. Pt is mixed incontinence. Eats regular diet. Denies SOB, CP, N&V and chill or fever. Transfer A1 [facility]. No acute issues this shift. Call light within reach. Continue to monitor POC.               Patient's most recent vital signs are:     Vital signs:  BP: 131/65  Temp: 97.7  HR: 77  RR: 16  SpO2: 98 %     Patient does not have new respiratory symptoms.  Patient does not have new sore throat.  Patient does not have a fever greater than 99.5.

## 2023-09-25 NOTE — PROGRESS NOTES
SW received call today stating pt will go.  We talked about time and pt's belongings.  If there are too many, brother will have to come back.     NERYEDA Sharp   [facility], Transitional Care Unit   Social Work   [facility] [place] [address], [address]th [place] [address]  ) [phone]

## 2023-09-26 NOTE — PROGRESS NOTES
MDS Pain Assessment    The following is the pain interview as conducted by the TCU RN caring for the patient on September 26, 2023. This assessment is required by the [facility] for all patients in [place] SNF (Skilled Nursing Facilities).     . Pain Presence  Have you had pain or hurting at any time in the last 5 days?   1. Yes    . Pain Frequency  How much of the time have you experienced pain or hurting over the last 5 days?   3. Frequently    . Pain Effect on Sleep  Over the past 5 days, how much of the time has pain made it hard for you to sleep at night?   2. Occasionally    . Pain Interference with Therapy Activities  Over the past 5 days, how often have you limited your participation in rehabilitation therapy sessions due to pain?   3. Frequently    . Pain Interference with Day-to-Day Activities  Over the past 5 days, have you limited your day-to-day activities (excluding rehabilitation therapy sessions) because of pain?  2. Occasionally    . Pain intensity   Numeric Rating Scale (00-10): Please rate your worst pain over the last 5 days on a zero to ten scale, with zero being no pain and ten as the worst pain you can imagine. 05

## 2023-09-26 NOTE — PLAN OF CARE
Patient is alert and oriented x4. Able to make needs known to staff. Patient is mixed incontinence. Voids spontaneously without difficulty. Transfers assist-2 w/ liko lift. Patient denied Chest pain, SOB, new onset cough and N&V. Diet: Regular diet with fair appetite. Dressing to LLE wound site changed by [facility] today. Call-light within reach. Continue with POC.    Discharge to nursing [facility]  D:  Patient has orders to discharge to nursing home.  I:  Reviewed discharge orders for completeness. Patients belongings gathered for discharge.  A:  Patient is aware of discharge.  Patient is being transported by [facility] transport to the nursing home.  P:  Discharge to hospitals at [facility].     Called facility to give report but no one answered. Called nursing station- no answer (phone rung until line was off). ADON- no answer(phone rung until line was off), DON - no answer (voicemail left), SW - no answer (voicemail left). TCU charge nurse notified        Vital signs: T: 97.5,  B/P: 121/59,  P: 67,  R: 16,  SpO2: 99 %       Patient does not have new respiratory symptoms.  Patient does not have new sore throat.  Patient does not have a fever greater than 99.5.

## 2023-09-26 NOTE — DISCHARGE INSTRUCTIONS
PRIMARY CARE FOLLOW UP APPOINTMENT   Date:10/4/2023  Time: 10:45am   Provider: Skip Felix   Address: [address]  [place] [address]  Phone: [phone]

## 2023-09-26 NOTE — PLAN OF CARE
Goal Outcome Evaluation:    No acute issue during shift. Alert and oriented. Able to make needs known. Left heel wound dressing intact. Fall precaution maintained. Pain managed with scheduled tylenol. Patient slept well overnight. Comfortable in bed during rounds. Continent of bowel. Incontinent of bladder. Possible discharge today 09/26/23.    Patient's most recent vital signs are:     Vital signs:  BP: 121/64  Temp: 98  HR: 73  RR: 16  SpO2: 98 %     Patient does not have new respiratory symptoms.  Patient does not have new sore throat.  Patient does not have a fever greater than 99.5.

## 2023-09-26 NOTE — PROGRESS NOTES
[facility] Transitional Care Unit  [facility] Nurse Inpatient Assessment     Consulted for: Left heel, buttock excoration    Patient History (according to provider note(s):      Per Mariana Sorto NP on 8/28/2023: Waldo Bustos is a 72 year old man with a history of type 2 DM, HLD, chronic back pain, JAIR, DVT/PE on DOAC, prior L BELLA eventually treated with two stage revision w/ explant in 11/2022 and second stage in 5/2023, RA, and venous insufficiency admitted from TCU to [facility] on 6/17 with encephalopathy and MRSA bacteremia.  Underwent I&D with Dr. Meyers on 6/21/23.  Currently on prolonged oral antibiotic course.  He is transferred back to TCU on 8/28 for PT and OT.       Assessment:      Areas visualized during today's visit: Sacrum/coccyx and Lower extremities   Pt refused photos today     Pressure Injury Location: Left heel      9/13  Last photo: 9/19     Wound type: Pressure Injury     Pressure Injury Stage: Unstageable, hospital acquired, assessed with SANDRA Cook on 7/28  Wound history/plan of care:  Pt known to [facility] service. Pt known to decline cares and repositioning.   7/19 initial assessment: Spent approx 20 mins on negotiating how to move leg to be able to minimally assess wound. Pt agreed to see picture of wound. This writer explained wound to pt using the photo. Discussed with patient if he would like legs elevated on pillows or boots. He agreed to pillows.    7/20: Assessed wound with Kristen Hadley RN CWOCN. Confirmed thick slough/eschar wound base. Again discussed keeping heels off bed with pt.  7/25/2023: Of note, patient has refused and continues to refuse heel off-loading boots on assessment today.  7/28: pt had heel lift boots in place  Wound base: 50 % slough/ 50% granular      Palpation of the wound bed: normal      Drainage: scant     Description of drainage: serosanguinous     Measurements (length x width x depth, in cm) 2.3 x 1.1 x 0.3 cm, no change      Tunneling  N/A     Undermining N/A  Periwound skin: Intact      Color: pink      Temperature: normal   Odor: none  Pain: severe, shooting and stabbing  Pain intervention prior to dressing change: slow and gentle cares   Treatment goal: Infection control/prevention and soften nonviable tissue  STATUS: improving now with slough base   Supplies ordered: discussed with RN and discussed with patient     9/13: heel on bed- pillows conpressed    Wound location: left buttock and gluteal cleft    Last photo: 9/19  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)  Wound history/plan of care: pt is incontinent and has previously refused assessment of area by [facility]  Wound base: 90 % epidermis 10% dermis     Palpation of the wound bed: normal      Drainage: none     Description of drainage: none     Measurements (length x width x depth, in cm): see photo, breakdown in cleft and on left buttock. Breakdown on buttock from scratching.      Tunneling: N/A     Undermining: N/A  Periwound skin: Intact and Erythema- blanchable      Color: red      Temperature: normal   Odor: none  Pain: moderate, sharp- mostly related to left hip pain  Pain interventions prior to dressing change: slow and gentle cares   Treatment goal: Maintain (prevention of deterioration) and Protection  STATUS: stable  Supplies ordered: at bedside, discussed with RN, and discussed with patient      9/13: pt is refusing antifungal powder and barrier paste  9/19: pt refusing chux and antifungal powder. Paste was allowed today.   9/26: mepilex removed from buttock, educated nurse and patient on why we don't want to use one with frequent urinary incontinence        Treatment Plan:     Bilateral buttocks and gluteal cleft wounds: BID and PRN with incontinence episodes  Cleanse wound with dry wipes and martell cleanse and protect spray.   Apply light layer of Triad paste (#828204) to wounds and red skin on bilateral buttocks  With repeat application, do not scrub the paste, only  remove soiled paste and reapply.  If complete removal of paste is necessary use baby oil/mineral oil (#182912) and soft wash cloth.  Ensure pt has Isaac-cushion (#307890) while sitting up in the chair.  Use only one Covidien pad in between mattress and pt. No brief in bed.    Left heel wound(s): Every other day   Strongly encourage pt to elevate heels on pillows to float off bed surface at all times. (He agreed to this with WOC)  Cleanse wound with wound cleanser.   Apply light layer of Triad paste (#422843) to wound bed  Secure with mepilex.  If patient agrees to heel off-loading boots, please apply.    Orders: Reviewed    RECOMMEND PRIMARY TEAM ORDER: None, at this time  Education provided: importance of repositioning, plan of care, wound progress and Infection prevention   Discussed plan of care with: Patient and Nurse  WO nurse follow-up plan: weekly, stable POC  Notify WOC if wound(s) deteriorate.  Nursing to notify the Provider(s) and re-consult the WO Nurse if new skin concern.    DATA:     Current support surface: Bariatric Low air loss (BIN pump, Isolibrium, Pulsate, skin guard, etc)  Containment of urine/stool: Incontinence Protocol  BMI: Body mass index is 29.79 kg/m .   Active diet order: Orders Placed This Encounter      Regular Diet Adult      Diet     Output: I/O last 3 completed shifts:  In: -   Out: 200 [Urine:200]     Labs:   Recent Labs   Lab 09/25/23  0645   HGB 8.3*   WBC 9.3       Pressure injury risk assessment:   Sensory Perception: 3-->slightly limited  Moisture: 3-->occasionally moist  Activity: 2-->chairfast  Mobility: 3-->slightly limited  Nutrition: 2-->probably inadequate  Friction and Shear: 2-->potential problem  Rafael Score: 15    Mona Horton RN  CWOCN  Pager no longer is use, please contact through [email or web address] group: [facility] Nurse [facility]  Dept. Office Number: *[phone]

## 2023-09-26 NOTE — PROGRESS NOTES
Discharge Plan     Discharge Date:  9/26/23  Discharge Disposition:  [facility]      Discharge Services:  ORDERS AND DISCHARGE SUMMARY WILL NEED TO BE FAXED TO [facility]. They asked for updated orders to Tramadol to have range or dosage.      Discharge Supplies: None as pt is going to SNF.     ZEB called in [facility] ask pt to be put on EW list at [facility]. They told me to call [facility].   SW called and left a vm for [facility].  They called back and said you have to request on their website.     ZEB called Francesca/[facility] and told her to do that. As he is there now.     Discharge Transportation: [facility].     ZEB did tell pt there was an outbreak of covid at [facility].     SW left a vm asking SNF SW to make EW referral as it has to be done online.      ZEB emailed brother and nephew telling them to check in with SNF SW to make sure she makes a EW referral.      NEREYDA Sharp   [facility], Transitional Care Unit   Social Work   [facility] [place] [address], [address]th [place] [address]  () [phone]

## 2023-09-26 NOTE — PLAN OF CARE
Goal Outcome Evaluation:         A&O X 4. Denies CP, SOB, N&V, and chill or fever. Eats regular diet with poor appetite. A 2 with liko lift. Takes med whole with thin liquid. Able to make needs known. Contact isolation maintained. Heel dressing CDI. Continent to bowel, but incontinent to bladder. Assisted with bed repositioning. Call light within reach. No acute issues this shift. Continue to monitor POC.             Patient's most recent vital signs are:     Vital signs:  BP: 121/64  Temp: 98  HR: 73  RR: 16  SpO2: 98 %     Patient does not have new respiratory symptoms.  Patient does not have new sore throat.  Patient does not have a fever greater than 99.5.

## 2023-10-23 NOTE — CARE PLAN
Pt is A & O x4, is non weight bearing on the LLE. Transfers with an assist of 2 using the liko lift. Had an uneventful night. Denies SOB and CP. Call light is with in reach. Continue POC..          Patient's most recent vital signs are:     Vital signs:  BP: 124/70  Temp: 95.7  HR: 85  RR: 18  SpO2: 95 %     Patient does not have new respiratory symptoms.  Patient does not have new sore throat.  Patient does not have a fever greater than 99.5.          Azelaic Acid Pregnancy And Lactation Text: This medication is considered safe during pregnancy and breast feeding.

## 2023-10-29 ENCOUNTER — HEALTH MAINTENANCE LETTER (OUTPATIENT)
Age: 73
End: 2023-10-29

## 2023-11-01 NOTE — PROGRESS NOTES
"Orthopedic Surgery Progress Note: 12/15/2022    Subjective:   No acute events overnight. Tolerating a diet. No new concerns or complaints. Denies SOB, chest Pain, fevers, chills.    Still has a lot of pain, not getting out of bed    Objective:   /51 (BP Location: Right arm)   Pulse 71   Temp 97.5  F (36.4  C) (Oral)   Resp 17   Ht 1.778 m (5' 10\")   Wt 147.1 kg (324 lb 4.8 oz)   SpO2 92%   BMI 46.53 kg/m    No intake/output data recorded.  General: NAD. Resting comfortably in bed.  Respiratory: Breathing comfortably on RA.  Musculoskeletal:    Focused Exam of Left Lower Extremity  Incision healing well, Steri-Strips in place. Glue beneath.  Fires EHL, FHL, TA, GSC, Quad  SILT DP, SP, Saph, Sural, Tibial Nerve Distributions  1+ DP pulse, foot WWP. 1+ edema leg.     Drain removed on 12/6    Laboratory Data:  Lab Results   Component Value Date    WBC 8.5 12/12/2022    HGB 9.6 (L) 12/12/2022     12/12/2022         Assessment & Plan:   Waldo Bustos is a 72 year old male s/p Explantation of Chronic  Infected Left Total Hip Arthroplasty, Extended Trochanteric Osteotomy with Extensive Debridement and Reconstruction Using Antibiotic Cement Spacer on 11/22/2022 with Dr. Meyers.     12/15:   - Out of bed, to chair if patient allows.  Encouragement required.  - ID final recommendations in (see below), started on PO Abx on 12/1    Cultures: Proteus mirabilis    ----Infectious disease recommendations below----  -po ciprofloxacin 500mg BID and po metronidazole 500mg TID  Duration of total 6 weeks, from 11/22/2022.   -follow up with ID in 5 weeks, ideally ~1 week prior to completion of antibiotics      Activity: Up with assist and assistive devices as needed until independent.  Weight bearing status: NWB LLE  Antibiotics: Per ID, oral ciprofloxacin 500 mg bid and flagyl 500 mg TID   Diet: Begin with clear fluids and progress diet as tolerated. Bowel regimen. Anti-emetics PRN.    DVT prophylaxis: Initially on "  mg (postop), then Eliquis 2.5 mg BID x2 weeks after surgery (started on 11/28), NOW ON 5 mg BID PTA, initially held for anemia  Elevation: Elevate heels off of bed on pillows   Wound Care: Open to air.  Drains: 15f GUERRERO drain REMOVED on 12/6  Tran: Removed POD1  Pain management: Orals PRN, IV for breakthrough only  X-rays: AP Pelvis and lateral in PACU complete  Physical Therapy: Mobilization, ROM, ADL's  Occupational Therapy: ADL's  Labs: Trend Hgb on POD #1, 2  Consults: PT, OT. ID. Hospitalist, appreciate assistance in caring for this patient throughout the perioperative period-     Disposition: Pending progress with therapies, pain control on orals, and medical stability; anticipate discharge to likely TCU after Hgb stabilized. ID plan in place.     Orthopedic surgery staff for this patient is Dr. Meyers.    --------------------------------------  Damian Frank MD  PGY-4 Orthopaedic Surgery         Requested.  See dictated note.  Tolerated repair.  F/u next wk.

## 2024-01-01 NOTE — PLAN OF CARE
Problem: Infant Inpatient Plan of Care  Goal: Plan of Care Review  Outcome: Ongoing, Progressing  Goal: Patient-Specific Goal (Individualized)  Outcome: Ongoing, Progressing  Goal: Absence of Hospital-Acquired Illness or Injury  Outcome: Ongoing, Progressing  Goal: Optimal Comfort and Wellbeing  Outcome: Ongoing, Progressing  Intervention: Provide Person-Centered Care  Recent Flowsheet Documentation  Taken 2024 08 by Juan Finley RN  Psychosocial Support:   care explained to patient/family prior to performing   presence/involvement promoted   questions encouraged/answered   support provided   supportive/safe environment provided  Goal: Readiness for Transition of Care  Outcome: Ongoing, Progressing     Problem: Hypoglycemia ([place])  Goal: Glucose Stability  Outcome: Ongoing, Progressing     Problem: Infection ()  Goal: Absence of Infection Signs and Symptoms  Outcome: Ongoing, Progressing     Problem: Oral Nutrition ()  Goal: Effective Oral Intake  Outcome: Ongoing, Progressing     Problem: Infant-Parent Attachment ()  Goal: Demonstration of Attachment Behaviors  Outcome: Ongoing, Progressing  Intervention: Promote Infant-Parent Attachment  Recent Flowsheet Documentation  Taken 2024 08 by Juan Finley RN  Psychosocial Support:   care explained to patient/family prior to performing   presence/involvement promoted   questions encouraged/answered   support provided   supportive/safe environment provided     Problem: Pain ([place])  Goal: Acceptable Level of Comfort and Activity  Outcome: Ongoing, Progressing     Problem: Respiratory Compromise ([place])  Goal: Effective Oxygenation and Ventilation  Outcome: Ongoing, Progressing     Problem: Skin Injury ()  Goal: Skin Health and Integrity  Outcome: Ongoing, Progressing     Problem: Temperature Instability ()  Goal: Temperature Stability  Outcome: Ongoing, Progressing   Goal Outcome Evaluation:                       Goal Outcome Evaluation:       VSS. Alert and orientedx4. Declined repositioning. Declined going to shower stating I can't tolerate sitting in the shower. Encourage to have a bed bath/sponge bath but patient also declined. A-2 lift for transfer.   Declined going to recliner. Stayed in bed the whole shift. Diversion activities offered but declined.Will continue plan of care.  Vital signs:  Temp: 97.4  F (36.3  C) Temp src: Oral BP: 134/72 Pulse: 72   Resp: 14 SpO2: 97 % O2 Device: None (Room air)

## 2024-09-06 NOTE — CARE PLAN
"RN: Denies pain or needing pain meds. \"I move my right leg a little and get a little pain, no pain when I am not moving it\" Right middle fingers sl numb and tingle, says has had for a long time. No problems using right arm/hand. Bg 97 this morning, received victoza.     Patient's most recent vital signs are:     Vital signs:  BP: 117/58  Temp: 96.9  HR: 70  RR: 18  SpO2: 96 %     Patient does not have new respiratory symptoms.  Patient does not have new sore throat.  Patient does not have a fever greater than 99.5.          " 84-year-old male presenting to the emergency department for urinary retention due to malposition Osullivan.  Urology came down and replace Osullivan.  Patient has an appointment with his urologist tomorrow. DC home with strict return precautions.

## 2024-11-19 NOTE — PROGRESS NOTES
OT Weekly Update  No progress toward goals in OT today. Th able to help initiate sitting EOB with total assist for LLE but pt unwilling to complete the task. Decrease cognition is evident, as well as depressive statements, during OT tx. Pain is also a barrier though pt refused his AM pain meds today. Th will continue to instruct pt in POC, he is unable to p.s. or generalize how bed mobility, getting OOB and strengthening can lead to improved functional performance with ADL/mobility. Plan to con't POC and request that pt is medicated for pain prior to OT tx. Brother, Ivan was present at OT eval and stated that th could call him if pt was not participating. Th will consider a call to family if this will encourage the pt.    Reference ECG taken

## 2024-11-27 NOTE — PLAN OF CARE
Goal Outcome Evaluation:    Patient was somnolent with intermittent confusion this morning, but aroused to voice and touch. Pt showed some improvements in mental status this afternoon; he is able to identify self, location, situation, day and able have meaningful conversations with his friend.  He is able to make his needs known. Pt denied SOB and chest pain.  Pt is regular diet, and able to swallow without difficulty.Takes med whole with thin liquid. Pt only drank half of the chocolate shake his friend brought for breakfast. Pt is PWB to E. Assist of 2 with liko lift for transfer. He is continent of bowel and incontinent of bladder with urinal within reach at bedside. GUERRERO drain is intact with 15 ml dark reddish-pink blood with small sediments emptied from bulb. Incision site is currently opened to air per order. GUERRERO drain site dressing was soaked with yellowish drainage. Performed incision site care and dressing change. Pt is expected to have Echo KEILA on Monday 6/19; and he should be on NPO protocol on Sunday night for the procedure. Scheduled IV Vancomycin administered as ordered with no notable concern. Continue to monitor and evaluate per plan of care.         Patient's most recent vital signs are:     Vital signs:  BP: 131/54  Temp: 97.6  HR: 87  RR: 16  SpO2: 98 %     Patient does not have new respiratory symptoms.  Patient does not have new sore throat.  Patient does not have a fever greater than 99.5.                              on the discharge service for the patient. I have reviewed and made amendments to the documentation where necessary.

## 2025-01-16 NOTE — PROGRESS NOTES
"SPIRITUAL HEALTH SERVICES Progress Note  [facility] ([facility]) 5 Ortho    Introduced self and oriented pt to [facility]. Pt said, \"I don't need anything right now. I'm getting ready for surgery this afternoon.\" Pt did not request a follow up visit at this time. Pt is aware of how to how to get a [facility] consult if the need arises.    Ellie Machuca   Intern  Pager [phone]      * [facility] remains available 24/7 for emergent requests/referrals, either by having the switchboard page the on-call  or by entering an ASAP/STAT consult in Epic (this will also page the on-call ). Routine Epic consults receive an initial response within 24 hours.*    " Render Post-Care Instructions In Note?: no Show Applicator Variable?: Yes Number Of Freeze-Thaw Cycles: 1 freeze-thaw cycle Detail Level: Detailed Post-Care Instructions: I reviewed with the patient in detail post-care instructions. Patient is to wear sunprotection, and avoid picking at any of the treated lesions. Pt may apply Vaseline to crusted or scabbing areas. Consent: The patient's consent was obtained including but not limited to risks of crusting, scabbing, blistering, scarring, darker or lighter pigmentary change, recurrence, incomplete removal and infection. Duration Of Freeze Thaw-Cycle (Seconds): 10 Medical Necessity Clause: This procedure was medically necessary because the lesions that were treated were: Spray Paint Text: The liquid nitrogen was applied to the skin utilizing a spray paint frosting technique. Medical Necessity Information: It is in your best interest to select a reason for this procedure from the list below. All of these items fulfill various CMS LCD requirements except the new and changing color options.

## 2025-01-28 NOTE — PROGRESS NOTES
Mounjaro Approved    Filling Pharmacy: [facility]     ZEB left a vm for  Catrachito in Financial Counseling to see if he could call the [facility] and find out where we are at with MA application.    NEREYDA Sharp   [facility], Transitional Care Unit   Social Work   [address] [place] [address], [address] [place] [address]  () [phone]

## 2025-06-26 NOTE — PROGRESS NOTES
Adapted from prior ENT note:    Patient is with persistent hyperglycemia this week - 427 on presentation today- surgery is canceled.  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       Past Surgical History:   Procedure Laterality Date    ANGIOPLASTY Left 2014    left leg    CARDIAC PROCEDURE Left 2024    Left heart cath / coronary angiography performed by Isis Colindres MD at [facility] CARDIAC CATH LAB     SECTION  1998    CORONARY ANGIOPLASTY WITH STENT PLACEMENT      [facility]    CORONARY ANGIOPLASTY WITH STENT PLACEMENT      3 stents in [place]    CORONARY ANGIOPLASTY WITH STENT PLACEMENT  2016    debbi, 1 stent to RCA    CT NEEDLE BIOPSY LUNG PERCUTANEOUS W IMAGING GUIDANCE  2022    CT NEEDLE BIOPSY LUNG PERCUTANEOUS 2022 [facility] CT SCAN    ESOPHAGOGASTRODUODENOSCOPY  10/06/2022    [facility]    GASTROSTOMY TUBE PLACEMENT N/A 2022    EGD Peg Tube Placement possible Open G Tube performed by Alejandro Harry MD at [facility] OR    LARYNGOSCOPY N/A 2025    TEP placement, with balloon dilation of esophageal inlet performed by Colt Coughlin MD at [facility] OR    LARYNGOSCOPY N/A 2025    TEP placement performed by Colt Coughlin MD at [facility] OR    LARYNGOSCOPY N/A 2025    Construction of TEP prosthesis performed by Colt Coughlin MD at [facility] OR    MOUTH SURGERY N/A 2024    Pharyngoplasty/esophagoplasty, cricropharyngeous myotomy performed  MDS Pain Assessment    The following is the pain interview as conducted by the TCU RN caring for the patient on June / 06 / 2023. This assessment is required by the [facility] for all patients in [place] SNF (Skilled Nursing Facilities).       1. Have you had pain or hurting at any time in the last 5 days? Yes    2. How much of the time have you experienced pain or hurting over the last 5 days? frequently    3. Over the past 5 days, has pain made it hard for you to sleep at night? Yes    4. Over the past 5 days, have you limited your day-to-day activities because of pain? Yes    5. Pain intensity (Numeric scale used first-if patient unable to answer, verbal scale to be used.)    Numeric Scale: Please rate your worst pain over the last 5 days on a zero to ten scale, with zero being no pain and ten being the worst pain you can imagine.     9    Verbal Scale: USED ONLY if unable to give numeric, otherwise N/A  Please rate the intensity of your worst pain over the last 5 days.     not applicable       conditions, either hot and dry or cold and dry. A referral to [facility] was made for further management and aftercare of the stoma. Encouraged to engage in phone conversations to facilitate the use of her voice. A referral to Kristina, a nurse practitioner, was also made for the provision of durable medical equipment supplies and periodic cancer follow-ups.      Risks and benefits of using salt water for suctioning were discussed, including the potential for improved mucus clearance and prevention of obstruction. The importance of proper aftercare to prevent complications such as infection or tissue damage was emphasized. The patient was informed about the necessity of ongoing follow-up to ensure optimal stoma function and overall health.     In the future she will begin getting her cancer follow-ups with me which will involve a nasopharyngoscopy with some regularity to look at the inside of her throat to see if she has signs of persistent/recurrent carcinoma.     I spent over 10 minutes of face-to-face time with the patient the majority of which was dedicated to reviewing her complex history and structuring her follow-up care plan        Return in about 2 months (around 7/28/2025) for Taking over cancer surveillance follow-up care..           The patient (or guardian, if applicable) and other individuals in attendance with the patient were advised that Artificial Intelligence will be utilized during this visit to record, process the conversation to generate a clinical note, and support improvement of the AI technology. The patient (or guardian, if applicable) and other individuals in attendance at the appointment consented to the use of AI, including the recording.          **This report has been created using voice recognition software. It may contain minor errors which are inherent in voice recognition technology.**

## (undated) DEVICE — DRAPE IOBAN INCISE 23X17" 6650EZ

## (undated) DEVICE — CONTAINER SPECIMEN 4OZ STERILE 17099

## (undated) DEVICE — DRAPE STOCKINETTE 8" 8586

## (undated) DEVICE — SUCTION MANIFOLD NEPTUNE 2 SYS 4 PORT 0702-020-000

## (undated) DEVICE — STRAP KNEE/BODY 31143004

## (undated) DEVICE — DRAIN JACKSON PRATT RESERVOIR 400ML SU130-1000

## (undated) DEVICE — SPONGE SURGIFOAM 12 1972

## (undated) DEVICE — PACK TOTAL HIP W/POUCH RIVERSIDE LATEX FREE

## (undated) DEVICE — PREP POVIDONE-IODINE 7.5% SCRUB 4OZ BOTTLE MDS093945

## (undated) DEVICE — SU PDS II 2-0 CT-1 27" Z339H

## (undated) DEVICE — STRAP STIRRUP W/SLIP 30187-030

## (undated) DEVICE — GLOVE BIOGEL PI MICRO INDICATOR UNDERGLOVE SZ 7.5 48975

## (undated) DEVICE — SOL NACL 0.9% IRRIG 3000ML BAG 2B7477

## (undated) DEVICE — GLOVE BIOGEL PI MICRO SZ 7.0 48570

## (undated) DEVICE — SOL NACL 0.9% IRRIG 1000ML BOTTLE 2F7124

## (undated) DEVICE — LINEN GOWN OVERSIZE 5408

## (undated) DEVICE — DRAPE MAYO STAND 23X54 8337

## (undated) DEVICE — POSITIONER ABDUCTION PILLOW FOAM MED FP-ABDUCTM

## (undated) DEVICE — SU MONOCRYL 3-0 PS-2 27" Y427H

## (undated) DEVICE — LINEN TOWEL PACK X5 5464

## (undated) DEVICE — DRAPE C-ARM W/STRAPS 42X72" 07-CA104

## (undated) DEVICE — DRSG TEGADERM 4X10" 1627

## (undated) DEVICE — LINEN BACK PACK 5440

## (undated) DEVICE — SU SILK 3-0 SH 30" K832H

## (undated) DEVICE — SYR BULB IRRIG DOVER 60 ML LATEX FREE 67000

## (undated) DEVICE — DRSG TEGADERM ALGINATE AG 4X5" 90303

## (undated) DEVICE — ESU ELEC BLADE 6" COATED/INSULATED E1455-6

## (undated) DEVICE — GOWN IMPERVIOUS SPECIALTY XLG/XLONG 32474

## (undated) DEVICE — PREP SKIN SCRUB TRAY 4461A

## (undated) DEVICE — FASTENER CATH STAYFIX 685ME

## (undated) DEVICE — DRSG TEGADERM 4X4 3/4" 1626W

## (undated) DEVICE — PREP DURAPREP REMOVER 4OZ 8611

## (undated) DEVICE — SU VICRYL 0 CT-1 27" J340H

## (undated) DEVICE — CATH TRAY FOLEY SURESTEP 16FR WDRAIN BAG STLK LATEX A300316A

## (undated) DEVICE — TRAY PREP DRY SKIN SCRUB 067

## (undated) DEVICE — DRAIN JACKSON PRATT 15FR ROUND SU130-1323

## (undated) DEVICE — ROD SYN REAMER BALL TIP 2.5X950MM  351.706S

## (undated) DEVICE — SOL WATER IRRIG 1000ML BOTTLE 2F7114

## (undated) DEVICE — NDL 18GA 1.5" 305196

## (undated) DEVICE — LINEN MAYO STAND COVER OVERSIZE PACK 5458

## (undated) DEVICE — BLADE KNIFE SURG 10 371110

## (undated) DEVICE — SU DERMABOND ADVANCED .7ML DNX12

## (undated) DEVICE — LINEN ORTHO PACK 5446

## (undated) DEVICE — ESU GROUND PAD UNIVERSAL W/O CORD

## (undated) DEVICE — TUBE CULTURE AEROBIC/ANAEROBIC W/O SWABS A.C.T.I.1. 12401

## (undated) DEVICE — GOWN XLG DISP 9545

## (undated) DEVICE — SPONGE RAY-TEC 4X8" 7318

## (undated) DEVICE — DRSG STERI STRIP 1/2X4" R1547

## (undated) DEVICE — Device

## (undated) DEVICE — SU SILK 2-0 SH 30" K833H

## (undated) DEVICE — SUCTION FRAZIER 12FR W/HANDLE K73

## (undated) DEVICE — NDL SPINAL 18GA 3.5" 405184

## (undated) DEVICE — DECANTER VIAL 2006S

## (undated) DEVICE — SPECIMEN CONTAINER 5OZ STERILE 2600SA

## (undated) DEVICE — KIT CULTURE TRANSPORT SYS A.C.T. II DUAL ANEROBE R124022

## (undated) DEVICE — ESU CLEANER TIP 31142717

## (undated) DEVICE — SPONGE LAP 18X18" X8435

## (undated) DEVICE — SUCTION IRR SYSTEM W/O TIP INTERPULSE HANDPIECE 0210-100-000

## (undated) DEVICE — DRAPE STOCKINETTE IMPERVIOUS 12" 1587

## (undated) DEVICE — DRAIN JACKSON PRATT ROUND W/TROCAR 15FR LF JP-HUR151

## (undated) DEVICE — ADH SKIN CLOSURE PREMIERPRO EXOFIN 1.0ML 3470

## (undated) DEVICE — LINEN DRAPE 54X72" 5467

## (undated) DEVICE — APPLICATORS COTTON TIP 6"X2 STERILE LF C15053-006

## (undated) DEVICE — SU ETHIBOND 1 CTX CR 8/18" CX30D

## (undated) DEVICE — DRSG DRAIN 4X4" 7086

## (undated) DEVICE — BONE CLEANING TIP INTERPULSE  0210-010-000

## (undated) DEVICE — ESU ELEC BLADE 6" COATED E1450-6

## (undated) DEVICE — TAPE MICROFOAM 4" 1528-4

## (undated) DEVICE — SU PDS II 0 CT-1 27" Z340H

## (undated) DEVICE — SU PROLENE 4-0 SHDA 36" 8521H

## (undated) DEVICE — SU PDS II 1 CTX 36" Z371T

## (undated) DEVICE — SUCTION TIP YANKAUER STR K87

## (undated) DEVICE — BLADE CLIPPER SGL USE 9680

## (undated) DEVICE — TUBING SMOKE EVAC 1CMX3M SEA3710

## (undated) DEVICE — COVER CAMERA IN-LIGHT DISP LT-C02

## (undated) DEVICE — TOOL DISSECT MIDAS MR8 9CM OVAL 5.5MM DIA MR8-9OV55

## (undated) DEVICE — GLOVE BIOGEL PI ULTRATOUCH SZ 7.0 41170

## (undated) DEVICE — PREP DURAPREP 26ML APL 8630

## (undated) DEVICE — SYR 10ML FINGER CONTROL W/O NDL 309695

## (undated) DEVICE — BLADE SAW SAGITTAL STRK XSHORT 25X9.5X0.6MM 2108-145-000

## (undated) DEVICE — TUBE SUCTION STD KAMVAC 5300-00-500

## (undated) DEVICE — SU PDS II 0 CTX 36" Z370T

## (undated) DEVICE — DRAPE C-ARMOR 5 SIDED 5523

## (undated) DEVICE — DRAPE CONVERTORS U-DRAPE 60X72" 8476

## (undated) DEVICE — GOWN IMPERVIOUS ZONED XLG 9041

## (undated) DEVICE — DRAPE IOBAN INCISE 36X23" 6651EZ

## (undated) DEVICE — SU PDS II 3-0 PS-1 18" Z683G

## (undated) DEVICE — SU SILK 3-0 TIE 12X30" A304H

## (undated) DEVICE — BLADE KNIFE SURG 15 371115

## (undated) DEVICE — SU SILK 2-0 SH CR 5X18" C0125

## (undated) DEVICE — SYR 10ML LL W/O NDL 302995

## (undated) DEVICE — LIGHT HANDLE X1 31140133

## (undated) DEVICE — BONE CLEANING TIP INTERPULSE FEMORAL CANAL 0210-008-000

## (undated) DEVICE — SU VICRYL 2-0 CT 36" J957H

## (undated) DEVICE — WIPES FOLEY CARE SURESTEP PROVON DFC100

## (undated) DEVICE — ESU PENCIL W/HOLSTER E2350H

## (undated) DEVICE — SU MONOCRYL 3-0 PS-2 18" UND Y497G

## (undated) DEVICE — BLADE SAW SAGITTAL STRK 34.5X11.5X0.64MM 2108-148-000S8

## (undated) DEVICE — TOOL DISSECT MIDAS MR8 14CM DIA WHEEL 25.4MM MR8-14MC254

## (undated) RX ORDER — FENTANYL CITRATE 50 UG/ML
INJECTION, SOLUTION INTRAMUSCULAR; INTRAVENOUS
Status: DISPENSED
Start: 2023-01-01

## (undated) RX ORDER — HYDROMORPHONE HYDROCHLORIDE 1 MG/ML
INJECTION, SOLUTION INTRAMUSCULAR; INTRAVENOUS; SUBCUTANEOUS
Status: DISPENSED
Start: 2023-01-01

## (undated) RX ORDER — FENTANYL CITRATE 50 UG/ML
INJECTION, SOLUTION INTRAMUSCULAR; INTRAVENOUS
Status: DISPENSED
Start: 2022-01-01

## (undated) RX ORDER — VANCOMYCIN HYDROCHLORIDE 1 G/20ML
INJECTION, POWDER, LYOPHILIZED, FOR SOLUTION INTRAVENOUS
Status: DISPENSED
Start: 2022-01-01

## (undated) RX ORDER — HYDROMORPHONE HYDROCHLORIDE 1 MG/ML
INJECTION, SOLUTION INTRAMUSCULAR; INTRAVENOUS; SUBCUTANEOUS
Status: DISPENSED
Start: 2022-01-01

## (undated) RX ORDER — DEXAMETHASONE SODIUM PHOSPHATE 4 MG/ML
INJECTION, SOLUTION INTRA-ARTICULAR; INTRALESIONAL; INTRAMUSCULAR; INTRAVENOUS; SOFT TISSUE
Status: DISPENSED
Start: 2023-01-01

## (undated) RX ORDER — FENTANYL CITRATE-0.9 % NACL/PF 10 MCG/ML
PLASTIC BAG, INJECTION (ML) INTRAVENOUS
Status: DISPENSED
Start: 2023-01-01

## (undated) RX ORDER — HYDROMORPHONE HCL IN WATER/PF 6 MG/30 ML
PATIENT CONTROLLED ANALGESIA SYRINGE INTRAVENOUS
Status: DISPENSED
Start: 2022-01-01

## (undated) RX ORDER — HEPARIN SODIUM 1000 [USP'U]/ML
INJECTION, SOLUTION INTRAVENOUS; SUBCUTANEOUS
Status: DISPENSED
Start: 2023-01-01

## (undated) RX ORDER — ACETAMINOPHEN 325 MG/1
TABLET ORAL
Status: DISPENSED
Start: 2023-01-01

## (undated) RX ORDER — ACETAMINOPHEN 325 MG/1
TABLET ORAL
Status: DISPENSED
Start: 2022-01-01

## (undated) RX ORDER — TRANEXAMIC ACID 650 MG/1
TABLET ORAL
Status: DISPENSED
Start: 2023-01-01

## (undated) RX ORDER — PROPOFOL 10 MG/ML
INJECTION, EMULSION INTRAVENOUS
Status: DISPENSED
Start: 2023-01-01

## (undated) RX ORDER — FENTANYL CITRATE 0.05 MG/ML
INJECTION, SOLUTION INTRAMUSCULAR; INTRAVENOUS
Status: DISPENSED
Start: 2023-01-01

## (undated) RX ORDER — FENTANYL CITRATE-0.9 % NACL/PF 10 MCG/ML
PLASTIC BAG, INJECTION (ML) INTRAVENOUS
Status: DISPENSED
Start: 2022-01-01

## (undated) RX ORDER — HEPARIN SODIUM (PORCINE) LOCK FLUSH IV SOLN 100 UNIT/ML 100 UNIT/ML
SOLUTION INTRAVENOUS
Status: DISPENSED
Start: 2023-01-01

## (undated) RX ORDER — LIDOCAINE HYDROCHLORIDE 10 MG/ML
INJECTION, SOLUTION EPIDURAL; INFILTRATION; INTRACAUDAL; PERINEURAL
Status: DISPENSED
Start: 2023-01-01

## (undated) RX ORDER — ONDANSETRON 2 MG/ML
INJECTION INTRAMUSCULAR; INTRAVENOUS
Status: DISPENSED
Start: 2023-01-01

## (undated) RX ORDER — OXYMETAZOLINE HYDROCHLORIDE 0.05 G/100ML
SPRAY NASAL
Status: DISPENSED
Start: 2023-01-01

## (undated) RX ORDER — HEPARIN SODIUM,PORCINE 10 UNIT/ML
VIAL (ML) INTRAVENOUS
Status: DISPENSED
Start: 2023-01-01

## (undated) RX ORDER — LIDOCAINE HYDROCHLORIDE 20 MG/ML
SOLUTION OROPHARYNGEAL
Status: DISPENSED
Start: 2023-01-01

## (undated) RX ORDER — GLYCOPYRROLATE 0.2 MG/ML
INJECTION INTRAMUSCULAR; INTRAVENOUS
Status: DISPENSED
Start: 2022-01-01

## (undated) RX ORDER — CEFAZOLIN SODIUM/WATER 3 G/30 ML
SYRINGE (ML) INTRAVENOUS
Status: DISPENSED
Start: 2022-01-01

## (undated) RX ORDER — TRANEXAMIC ACID 650 MG/1
TABLET ORAL
Status: DISPENSED
Start: 2022-01-01

## (undated) RX ORDER — ESMOLOL HYDROCHLORIDE 10 MG/ML
INJECTION INTRAVENOUS
Status: DISPENSED
Start: 2022-01-01

## (undated) RX ORDER — CEFAZOLIN SODIUM 1 G/3ML
INJECTION, POWDER, FOR SOLUTION INTRAMUSCULAR; INTRAVENOUS
Status: DISPENSED
Start: 2022-01-01

## (undated) RX ORDER — VANCOMYCIN HYDROCHLORIDE 1 G/200ML
INJECTION, SOLUTION INTRAVENOUS
Status: DISPENSED
Start: 2023-01-01

## (undated) RX ORDER — OXYCODONE HYDROCHLORIDE 5 MG/1
TABLET ORAL
Status: DISPENSED
Start: 2022-01-01

## (undated) RX ORDER — PROPOFOL 10 MG/ML
INJECTION, EMULSION INTRAVENOUS
Status: DISPENSED
Start: 2022-01-01

## (undated) RX ORDER — CEFAZOLIN SODIUM 2 G/100ML
INJECTION, SOLUTION INTRAVENOUS
Status: DISPENSED
Start: 2023-01-01

## (undated) RX ORDER — ONDANSETRON 2 MG/ML
INJECTION INTRAMUSCULAR; INTRAVENOUS
Status: DISPENSED
Start: 2022-01-01